# Patient Record
Sex: MALE | Race: WHITE | NOT HISPANIC OR LATINO | Employment: FULL TIME | URBAN - METROPOLITAN AREA
[De-identification: names, ages, dates, MRNs, and addresses within clinical notes are randomized per-mention and may not be internally consistent; named-entity substitution may affect disease eponyms.]

---

## 2017-01-04 RX ORDER — DICLOFENAC SODIUM 75 MG/1
TABLET, DELAYED RELEASE ORAL
Qty: 60 TABLET | Refills: 12 | Status: SHIPPED | OUTPATIENT
Start: 2017-01-04 | End: 2017-12-27 | Stop reason: ALTCHOICE

## 2017-01-04 RX ORDER — ALLOPURINOL 100 MG/1
TABLET ORAL
Qty: 60 TABLET | Refills: 12 | Status: SHIPPED | OUTPATIENT
Start: 2017-01-04 | End: 2018-01-08 | Stop reason: SDUPTHER

## 2017-01-04 RX ORDER — LORAZEPAM 1 MG/1
TABLET ORAL
Qty: 30 TABLET | Refills: 0 | Status: SHIPPED | OUTPATIENT
Start: 2017-01-04 | End: 2017-02-01 | Stop reason: SDUPTHER

## 2017-02-02 RX ORDER — LORAZEPAM 1 MG/1
TABLET ORAL
Qty: 30 TABLET | Refills: 0 | Status: SHIPPED | OUTPATIENT
Start: 2017-02-02 | End: 2017-02-27 | Stop reason: SDUPTHER

## 2017-02-16 ENCOUNTER — LAB VISIT (OUTPATIENT)
Dept: LAB | Facility: HOSPITAL | Age: 62
End: 2017-02-16
Payer: COMMERCIAL

## 2017-02-16 ENCOUNTER — OFFICE VISIT (OUTPATIENT)
Dept: HEPATOLOGY | Facility: CLINIC | Age: 62
End: 2017-02-16
Payer: COMMERCIAL

## 2017-02-16 VITALS
BODY MASS INDEX: 29.87 KG/M2 | RESPIRATION RATE: 20 BRPM | HEIGHT: 66 IN | HEART RATE: 68 BPM | DIASTOLIC BLOOD PRESSURE: 77 MMHG | WEIGHT: 185.88 LBS | OXYGEN SATURATION: 96 % | TEMPERATURE: 98 F | SYSTOLIC BLOOD PRESSURE: 136 MMHG

## 2017-02-16 DIAGNOSIS — K76.0 FATTY LIVER: Primary | ICD-10-CM

## 2017-02-16 DIAGNOSIS — R74.8 ELEVATED LIVER ENZYMES: ICD-10-CM

## 2017-02-16 DIAGNOSIS — D69.6 THROMBOCYTOPENIA: ICD-10-CM

## 2017-02-16 DIAGNOSIS — R79.89 ELEVATED FERRITIN: ICD-10-CM

## 2017-02-16 DIAGNOSIS — E11.9 TYPE 2 DIABETES MELLITUS WITHOUT COMPLICATION, WITHOUT LONG-TERM CURRENT USE OF INSULIN: ICD-10-CM

## 2017-02-16 DIAGNOSIS — E66.09 NON MORBID OBESITY DUE TO EXCESS CALORIES: ICD-10-CM

## 2017-02-16 DIAGNOSIS — K76.0 FATTY LIVER: ICD-10-CM

## 2017-02-16 DIAGNOSIS — E78.5 HYPERLIPIDEMIA, UNSPECIFIED HYPERLIPIDEMIA TYPE: ICD-10-CM

## 2017-02-16 DIAGNOSIS — F10.10 ALCOHOL ABUSE: ICD-10-CM

## 2017-02-16 LAB
ALBUMIN SERPL BCP-MCNC: 4 G/DL
ALP SERPL-CCNC: 54 U/L
ALT SERPL W/O P-5'-P-CCNC: 28 U/L
AST SERPL-CCNC: 51 U/L
BILIRUB DIRECT SERPL-MCNC: 0.3 MG/DL
BILIRUB SERPL-MCNC: 0.8 MG/DL
CERULOPLASMIN SERPL-MCNC: 15 MG/DL
FERRITIN SERPL-MCNC: 858 NG/ML
PROT SERPL-MCNC: 7.7 G/DL

## 2017-02-16 PROCEDURE — 3075F SYST BP GE 130 - 139MM HG: CPT | Mod: S$GLB,,, | Performed by: NURSE PRACTITIONER

## 2017-02-16 PROCEDURE — 99214 OFFICE O/P EST MOD 30 MIN: CPT | Mod: S$GLB,,, | Performed by: NURSE PRACTITIONER

## 2017-02-16 PROCEDURE — 3044F HG A1C LEVEL LT 7.0%: CPT | Mod: S$GLB,,, | Performed by: NURSE PRACTITIONER

## 2017-02-16 PROCEDURE — 80076 HEPATIC FUNCTION PANEL: CPT

## 2017-02-16 PROCEDURE — 82104 ALPHA-1-ANTITRYPSIN PHENO: CPT

## 2017-02-16 PROCEDURE — 36415 COLL VENOUS BLD VENIPUNCTURE: CPT

## 2017-02-16 PROCEDURE — 82728 ASSAY OF FERRITIN: CPT

## 2017-02-16 PROCEDURE — 2022F DILAT RTA XM EVC RTNOPTHY: CPT | Mod: S$GLB,,, | Performed by: NURSE PRACTITIONER

## 2017-02-16 PROCEDURE — 82390 ASSAY OF CERULOPLASMIN: CPT

## 2017-02-16 PROCEDURE — 81256 HFE GENE: CPT

## 2017-02-16 PROCEDURE — 3060F POS MICROALBUMINURIA REV: CPT | Mod: S$GLB,,, | Performed by: NURSE PRACTITIONER

## 2017-02-16 PROCEDURE — 3078F DIAST BP <80 MM HG: CPT | Mod: S$GLB,,, | Performed by: NURSE PRACTITIONER

## 2017-02-16 PROCEDURE — 99999 PR PBB SHADOW E&M-EST. PATIENT-LVL IV: CPT | Mod: PBBFAC,,, | Performed by: NURSE PRACTITIONER

## 2017-02-16 NOTE — MR AVS SNAPSHOT
[facility] - Hepatology  [address]  [facility] [address]  Phone: [phone]  Fax: [phone]                  Donald Warren Abadie   2017 10:00 AM   Office Visit    Description:  Male : 1955   Provider:  Marlena Sibley NP   Department:  [facility] - Hepatology           Reason for Visit     Fatty Liver     Elevated Hepatic Enzymes           Diagnoses this Visit        Comments    Fatty liver    -  Primary     Elevated liver enzymes         Elevated ferritin                To Do List           Future Appointments        Provider Department Dept Phone    2017 9:30 AM INJECTION, INFECTIOUS DISEASES [facility]- ID Injection Room [phone]      Goals (5 Years of Data)     None      [facility]     [facility] -  Assistance  Registered nurses in the [facility] provide clinical advisement, health education, appointment booking, and other advisory services.  Call for this free service at [phone].             Medications           Message regarding Medications     Verify the changes and/or additions to your medication regime listed below are the same as discussed with your clinician today.  If any of these changes or additions are incorrect, please notify your healthcare provider.             Verify that the below list of medications is an accurate representation of the medications you are currently taking.  If none reported, the list may be blank. If incorrect, please contact your healthcare provider. Carry this list with you in case of emergency.           Current Medications     allopurinol (ZYLOPRIM) 100 MG tablet TAKE 2 TABLETS BY MOUTH EVERY DAY    diclofenac (VOLTAREN) 75 MG EC tablet TAKE 1 TABLET BY MOUTH TWO TIMES A DAY    fenofibrate 160 MG Tab TAKE ONE TABLET BY MOUTH EVERY NIGHT AT BEDTIME    lorazepam (ATIVAN) 1 MG tablet take 1 tablet by mouth three times a day    metoprolol succinate (TOPROL-XL) 25 MG 24 hr tablet TAKE 1  "TABLET BY MOUTH EVERY DAY.    metoprolol tartrate (LOPRESSOR) 25 MG tablet Take 1 tablet (25 mg total) by mouth once daily. Pt states he only take this when needed    omega-3 acid ethyl esters (LOVAZA) 1 gram capsule Take 2 g by mouth 2 (two) times daily.    rosuvastatin (CRESTOR) 5 MG tablet Take 1 tablet (5 mg total) by mouth once daily.    aspirin 81 MG Chew Take 1 tablet (81 mg total) by mouth once daily.           Clinical Reference Information           Your Vitals Were     BP Pulse Temp Resp Height Weight    136/77 (BP Location: Left arm, Patient Position: Sitting, BP Method: Automatic) 68 98.1 °F (36.7 °C) (Oral) 20 5' 6" (1.676 m) 84.3 kg (185 lb 13.6 oz)    SpO2 BMI             96% 30 kg/m2         Blood Pressure          Most Recent Value    BP  136/77      Allergies as of 2/16/2017     No Known Drug Allergies      Immunizations Administered on Date of Encounter - 2/16/2017     None      Orders Placed During Today's Visit     Future Labs/Procedures Expected by Expires    MRI Abdomen Without Contrast  2/16/2017 2/16/2018    Alpha 1 Antitrypsin Phenotype  As directed 2/16/2018    Ceruloplasmin  As directed 2/16/2018    Ferritin  As directed 2/16/2018    Hemochromatosis DNA Analysis (PCR)  As directed 2/16/2018    Hepatic function panel  As directed 2/16/2018      Language Assistance Services     ATTENTION: Language assistance services are available, free of charge. Please call [phone].      ATENCIÓN: Si habla español, tiene a aguilar disposición servicios gratuitos de asistencia lingüística. Llame al [phone].     [facility] Ý: N?u b?n nói Ti?ng Vi?t, có các d?ch v? h? tr? ngôn ng? mi?n phí dành cho b?n. G?i s? [phone].         [facility] complies with applicable Federal civil rights laws and does not discriminate on the basis of race, color, national origin, age, disability, or sex.        "

## 2017-02-16 NOTE — PROGRESS NOTES
[facility] Established Patient Visit    Reason for Visit:  F/u fatty liver, alcohol use    PCP: Bacilio Larry    HPI:  This is a 61 y.o. male here for f/u of evaluation for fatty liver and alcohol use/abuse. He has a daily alcohol intake with 12 pack of beer for many yrs. He recently decreased his intake to 6 beers daily since Dr. Larry made him appt to come see us. He reports he got into the habit of drinking after work, used to work construction. He reports he can quit if needed. He has had mildly elevated transaminases with AST > ALT for yrs. Tbili nl for the most part. Alb nl. INR nl. Plts have been intermittently low but spleen nl on imaging. Ferritin was elevated in 2004 at 706 but with normal serum iron and iron sat. He had recent U/s that showed enlarged liver at 18.3 cm with steatosis. No ascites or masses. He started Crestor last year for his cholesterol TGL are high but this is familial. He is not on any medications for his DM, diet controlled currently.    His viral hepatitis B and C were negative. He has started vaccines for hepatitis A and B since he was not immune. His repeat ferritin was higher at 1344 with normal serum iron and iron sat still. His Fibroscan suggested possible F2, moderate fibrosis. He did decrease his alcohol intake some more. At initial visit, was drinking 6 beers daily, now is doing 3-4 daily. He stated he can't stop cold turkey because he's been drinking since he was 14. He will continue to wean down slowly.    He denies any symptoms of advanced chronic liver disease including jaundice, dark urine, hematemesis, melena, slowed mentation, abdominal distention.      ROS:   GENERAL: Denies fever, chills, weight loss/gain, fatigue  HEENT: Denies headaches, dizziness, vision/hearing changes  CARDIOVASCULAR: Denies chest pain, palpitations, or edema  RESPIRATORY: Denies dyspnea, cough  GI: Denies abdominal pain, rectal bleeding, nausea, vomiting. No change in bowel  "pattern or color  : Denies dysuria, hematuria   SKIN: Denies rash, itching   NEURO: Denies confusion, memory loss, or mood changes  PSYCH: Denies depression or anxiety  HEME/LYMPH: Denies easy bruising or bleeding      PMHX:  has a past medical history of A-fib; Alcohol abuse; Arthritis; Chronic gout; Diabetes mellitus; DM (diabetes mellitus) (11/16/2016); Fatty liver; Hyperlipidemia; and Renal cell cancer (2014).    PSHX:  has a past surgical history that includes Abscess drainage; Hand surgery (Left); Partial nephrectomy (Left, August 2014); and Colonoscopy.    The patient's social and family histories were reviewed by me and updated in the appropriate section of the electronic medical record.    Review of patient's allergies indicates:   Allergen Reactions    No known drug allergies        Medications reviewed in Epic      Objective Findings:    PHYSICAL EXAM:   Friendly White male, in no acute distress; alert and oriented to person, place and time  VITALS:   Visit Vitals    /77 (BP Location: Left arm, Patient Position: Sitting, BP Method: Automatic)    Pulse 68    Temp 98.1 °F (36.7 °C) (Oral)    Resp 20    Ht 5' 6" (1.676 m)    Wt 84.3 kg (185 lb 13.6 oz)    SpO2 96%    BMI 30 kg/m2     HENT: Normocephalic, without obvious abnormality. Oral mucosa pink and moist. Dentition good.  EYES: Sclerae anicteric. No conjunctival pallor.   NECK: Supple.   CARDIOVASCULAR: Regular rate and rhythm. No murmurs.  RESPIRATORY: Normal respiratory effort. BBS CTA. No wheezes or crackles.  GI: Soft, non-tender, non-distended. (+) hepatomegaly. No masses palpable. No ascites.  EXTREMITIES:  No clubbing, cyanosis or edema.  SKIN: Warm and dry. No jaundice. No rashes noted to exposed skin. (+) telangectasias noted. No palmar erythema.  NEURO:  Normal gate. No asterixis.  PSYCH:  Memory intact. Thought and speech pattern appropriate. Behavior normal. No depression or anxiety noted.      Labs:  Lab Results   Component " Value Date    WBC 6.88 10/13/2016    HGB 15.3 10/13/2016    HCT 43.3 10/13/2016     (L) 10/13/2016    CHOL 152 10/13/2016    TRIG 470 (H) 10/13/2016    HDL 22 (L) 10/13/2016     12/21/2016    K 4.4 12/21/2016    CREATININE 1.1 12/21/2016    ALT 37 12/21/2016    AST 65 (H) 12/21/2016    ALKPHOS 56 12/21/2016    BILITOT 0.5 12/21/2016    ALBUMIN 4.1 12/21/2016    INR 1.1 11/16/2016       ASSESSMENT:  61 y.o. White male with:  1.  Elevated liver enzymes, due to fatty liver due to alcohol abuse and overweight, DM, and HLD  -- AST > ALT  -- nl alk phos and Tbili   -- imaging shows hepatomegaly with steatosis  -- ferritin elevated but suspect d/t alcohol and inflammation  2. Fatty liver, due to both alcohol and NAFLD likely since he has DM And HLD and is overweight  -- transaminases mildly elevated, AST > ALT  -- US shows hepatomegaly with steatosis  -- synthetic liver function nl  -- risk factors for fatty liver include obesity, DM, HLD, alcohol abuse  -- Fibroscan = F2  3. Alcohol abuse  -- pt currently drinking 3-4 beers daily. Advised he try to decrease more to 2-3 daily. He will work on this  4. Elevated ferritin, suspect d/t alcohol use and inflammation  -- normal serum iron and iron sat but will check HH DNA analysis today  5. Thrombocytopenia, intermittent  -- spleen nl at 10.4 cm on u/s       EDUCATION:   We discussed the manifestations of fatty liver disease. At this time there is no FDA approved therapy for NAFLD.   The patient and I discussed the importance of diet, exercise, and weight loss for management of NAFLD. Discussed stopping alcohol as well since alcohol is definitely contributing to this as well.     We discussed a low fat, low carb/low sugar, high fiber diet, and a goal of 30 minutes of exercise 5 times per week.   Pt is aware that fatty liver can progress to steatohepatitis and possibly to cirrhosis, putting one at increased risk for liver cancer, liver failure, and death.      Pt is  aware that excessive alcohol use can also lead to cirrhosis, putting one at increased risk for liver cancer, liver failure, and death.     Discussed that he definitely has at least moderate fibrosis based on Fibroscan results but want to assess this further with MR elastography since he has intermittent thrombocytopenia, telangiectasias. If his scan does show F3 or F4, this may be a motivating factor to get him to quit alcohol completely.        PLAN:  1. Labs today  2. MR elastography  3. Continue to minimize alcohol use and stop completely if possible. He will try to decrease to 2-3 beers daily  4. Weight loss, goal of 10 lbs  5. F/u in 3 months    Thank you for allowing me to participate in the care of Kleber Pinedaen Abadie Alicia C Debautte, ZULEMA-C

## 2017-02-20 LAB
A1AT PHENOTYP SERPL-IMP: NORMAL BANDS
A1AT SERPL NEPH-MCNC: 136 MG/DL

## 2017-02-24 LAB
GENETICIST REVIEW: NORMAL
HFE GENE MUT ANL BLD/T: NORMAL
HFE RELEASED BY: NORMAL
HFE RESULT SUMMARY: NORMAL
REF LAB TEST METHOD: NORMAL
SPECIMEN SOURCE: NORMAL
SPECIMEN,  HEMOCHROMATOSIS: NORMAL

## 2017-02-27 RX ORDER — LORAZEPAM 1 MG/1
TABLET ORAL
Qty: 30 TABLET | Refills: 0 | Status: SHIPPED | OUTPATIENT
Start: 2017-02-27 | End: 2017-03-27 | Stop reason: SDUPTHER

## 2017-03-09 DIAGNOSIS — E78.5 HYPERLIPIDEMIA: ICD-10-CM

## 2017-03-10 RX ORDER — ROSUVASTATIN CALCIUM 5 MG/1
TABLET, COATED ORAL
Qty: 90 TABLET | Refills: 3 | Status: SHIPPED | OUTPATIENT
Start: 2017-03-10 | End: 2017-03-21 | Stop reason: SDUPTHER

## 2017-03-21 DIAGNOSIS — E78.5 HYPERLIPIDEMIA: ICD-10-CM

## 2017-03-21 RX ORDER — ROSUVASTATIN CALCIUM 5 MG/1
TABLET, COATED ORAL
Qty: 90 TABLET | Refills: 3 | Status: SHIPPED | OUTPATIENT
Start: 2017-03-21 | End: 2018-02-07 | Stop reason: SDUPTHER

## 2017-03-24 RX ORDER — METOPROLOL SUCCINATE 25 MG/1
TABLET, EXTENDED RELEASE ORAL
Qty: 30 TABLET | Refills: 1 | Status: SHIPPED | OUTPATIENT
Start: 2017-03-24 | End: 2017-03-27 | Stop reason: SDUPTHER

## 2017-03-27 RX ORDER — METOPROLOL SUCCINATE 25 MG/1
TABLET, EXTENDED RELEASE ORAL
Qty: 30 TABLET | Refills: 1 | Status: SHIPPED | OUTPATIENT
Start: 2017-03-27 | End: 2017-07-08 | Stop reason: SDUPTHER

## 2017-03-27 RX ORDER — LORAZEPAM 1 MG/1
TABLET ORAL
Qty: 30 TABLET | Refills: 0 | Status: SHIPPED | OUTPATIENT
Start: 2017-03-27 | End: 2017-04-24 | Stop reason: SDUPTHER

## 2017-04-21 ENCOUNTER — NURSE TRIAGE (OUTPATIENT)
Dept: ADMINISTRATIVE | Facility: CLINIC | Age: 62
End: 2017-04-21

## 2017-04-22 NOTE — TELEPHONE ENCOUNTER
"    Reason for Disposition   [1] BP  >= 140/90 AND [2] not taking BP medications    Protocols used:  HIGH BLOOD PRESSURE-A-JOSE Shahid 's daughter, Chani, called to say she is concerned about her father's blood pressure.  She said he was just sweeping his house, doing laundry and folding clothes today, and he took his BP. 170/110.  Chani said he did not take his Toprol XL 25 mg today until his pressure was that high, and he also took a "breakthrough control" of 25 mg Lopressor at that time.  He had no symptoms at that time, per Chani, and his BP was 146/86 after he took them. I asked if perhaps he has missed more doses, and she said that this is very possible.  Another concern she has is that his AF is recurring more frequently now.  Chani said it used to happen about once a month, then once a week, and now it happens at least every two days, and usually every other day.  Chani is tearful, but does not know how to help him.  She requests that this information be sent to his cardiologist, Dr Ramon Giang.  She says she knows he has not been in touch with him about the AF increasing.  Assured her I will message Dr Giang, and Dr Larry, PCP.  Please contact caller directly with any additional care advice.      "

## 2017-04-25 RX ORDER — LORAZEPAM 1 MG/1
TABLET ORAL
Qty: 30 TABLET | Refills: 0 | Status: SHIPPED | OUTPATIENT
Start: 2017-04-25 | End: 2017-05-16 | Stop reason: SDUPTHER

## 2017-04-27 ENCOUNTER — TELEPHONE (OUTPATIENT)
Dept: INTERNAL MEDICINE | Facility: CLINIC | Age: 62
End: 2017-04-27

## 2017-04-27 NOTE — TELEPHONE ENCOUNTER
----- Message from Emeka Jaquez sent at 4/27/2017  1:35 PM CDT -----  Contact: Chani/ Daughter/ [phone] cell  Type: Returning a call    Who left a message? Unknown    When did the practice call? 04/27/2017    Comments: Pt's daughter is returning the call and is waiting on a call back.  Please call and advise.    Thank you

## 2017-05-08 ENCOUNTER — TELEPHONE (OUTPATIENT)
Dept: ELECTROPHYSIOLOGY | Facility: CLINIC | Age: 62
End: 2017-05-08

## 2017-05-08 DIAGNOSIS — I48.91 ATRIAL FIBRILLATION, UNSPECIFIED TYPE: Primary | ICD-10-CM

## 2017-05-11 ENCOUNTER — OFFICE VISIT (OUTPATIENT)
Dept: INTERNAL MEDICINE | Facility: CLINIC | Age: 62
End: 2017-05-11
Payer: COMMERCIAL

## 2017-05-11 ENCOUNTER — TELEPHONE (OUTPATIENT)
Dept: ELECTROPHYSIOLOGY | Facility: CLINIC | Age: 62
End: 2017-05-11

## 2017-05-11 VITALS
DIASTOLIC BLOOD PRESSURE: 70 MMHG | WEIGHT: 181.44 LBS | HEART RATE: 58 BPM | SYSTOLIC BLOOD PRESSURE: 122 MMHG | OXYGEN SATURATION: 97 % | HEIGHT: 66 IN | BODY MASS INDEX: 29.16 KG/M2

## 2017-05-11 DIAGNOSIS — R03.0 TRANSIENT ELEVATED BLOOD PRESSURE: Primary | ICD-10-CM

## 2017-05-11 PROCEDURE — 99999 PR PBB SHADOW E&M-EST. PATIENT-LVL III: CPT | Mod: PBBFAC,,, | Performed by: PHYSICIAN ASSISTANT

## 2017-05-11 PROCEDURE — 93005 ELECTROCARDIOGRAM TRACING: CPT | Mod: S$GLB,,, | Performed by: INTERNAL MEDICINE

## 2017-05-11 PROCEDURE — 1160F RVW MEDS BY RX/DR IN RCRD: CPT | Mod: S$GLB,,, | Performed by: PHYSICIAN ASSISTANT

## 2017-05-11 PROCEDURE — 3078F DIAST BP <80 MM HG: CPT | Mod: S$GLB,,, | Performed by: PHYSICIAN ASSISTANT

## 2017-05-11 PROCEDURE — 3074F SYST BP LT 130 MM HG: CPT | Mod: S$GLB,,, | Performed by: PHYSICIAN ASSISTANT

## 2017-05-11 PROCEDURE — 99213 OFFICE O/P EST LOW 20 MIN: CPT | Mod: S$GLB,,, | Performed by: PHYSICIAN ASSISTANT

## 2017-05-11 PROCEDURE — 93010 ELECTROCARDIOGRAM REPORT: CPT | Mod: S$GLB,,, | Performed by: INTERNAL MEDICINE

## 2017-05-11 NOTE — TELEPHONE ENCOUNTER
----- Message from Faith Parrish sent at 5/11/2017  8:12 AM CDT -----  Contact: pt's daughter  Pt has been experiencing an increase in his blood pressure.  He vomited yesterday and he has been going into Afib more often.  No other symptoms.  He wanted an appt today but there are none available.  His daughter can be reached @ [phone].  Thanks!!

## 2017-05-11 NOTE — PROGRESS NOTES
Subjective:       Patient ID: Donald Warren Abadie is a 61 y.o. male.        Chief Complaint: Hypertension and Emesis    HPI Comments: Donald Warren Abadie is an established patient of Bacilio Larry MD here today for urgent care visit.    Within the last 2-3 months, he has noticed higher BP readings.  He has a home BP cuff and checks his BP daily.  Occasionally elevated readings.  In the past week he has had more persistently elevated readings.  2 times in the last week with high readings.  Last week 180/101.  Last night BP was 160 systolic.  This morning 148/90.  Right now blood pressure is fine.      He takes Toprol XL 25 mg but breaking it in half and taking 1/2 morning and 1/2 in evening.  He has Metoprolol 25 mg to use for breakthrough.  He also takes Ativan at times when BP goes up because he gets anxious.      Drinking more alcohol than usual the past week.  Drinks 12 beers daily.      Can feel when he goes into Afib, follows with Dr. Giang.  Has f/u with him next week.  He has had more frequent episodes of Afib.      No chest pain or shortness of breath.  Vomited once yesterday after drinking too much alcohol and not eating.  Only one episode, no recurrence.  No diarrhea or constipation.  No abdominal pain.  No fever.           Review of Systems   Constitutional: Negative for appetite change, chills, fatigue and fever.   HENT: Negative for congestion and sore throat.    Eyes: Negative for visual disturbance.   Respiratory: Negative for cough, chest tightness and shortness of breath.    Cardiovascular: Negative for chest pain, palpitations and leg swelling.   Gastrointestinal: Positive for vomiting. Negative for abdominal pain, blood in stool, constipation, diarrhea and nausea.   Genitourinary: Negative for dysuria, frequency, hematuria and urgency.   Musculoskeletal: Negative for arthralgias and back pain.   Skin: Negative for rash.   Neurological: Negative for dizziness, syncope, weakness and headaches.  "  Psychiatric/Behavioral: Negative for dysphoric mood and sleep disturbance. The patient is not nervous/anxious.        Objective:      Physical Exam   Constitutional: He appears well-developed and well-nourished.   HENT:   Head: Normocephalic.   Right Ear: External ear normal.   Left Ear: External ear normal.   Mouth/Throat: Oropharynx is clear and moist.   Eyes: Pupils are equal, round, and reactive to light.   Cardiovascular: Normal rate, regular rhythm and normal heart sounds.  Exam reveals no gallop and no friction rub.    No murmur heard.  Pulmonary/Chest: Effort normal and breath sounds normal. No respiratory distress.   Abdominal: Soft. There is no tenderness.   Musculoskeletal: He exhibits no edema.   Neurological: He is alert.   Skin: Skin is warm and dry.   Psychiatric: He has a normal mood and affect.   Nursing note and vitals reviewed.      Assessment:       1. Transient elevated blood pressure        Plan:       Kleber was seen today for hypertension and emesis.    Diagnoses and all orders for this visit:    Transient elevated blood pressure  -     EKG 12-lead    EKG reviewed with Dr. Larry.  He has been drinking more alcohol in the past week.  Importance of cutting back discussed.  Blood pressure is fine in clinic today.  Continue to monitor BP readings.  Bring in log for review with Dr. Larry in 2-4 weeks.  ED prompts discussed in detail.  Keep appointment with Dr. Giang next week.    Pt has been given instructions populated from [employer] database and has verbalized understanding of the after visit summary and information contained wherein.    Follow up with a primary care provider. May go to ER for acute shortness of breath, lightheadedness, fever, or any other emergent complaints or changes in condition.    "This note will be shared with the patient"    Future Appointments  Date Time Provider Department Center   5/16/2017 8:00 AM Ramon Giang MD [facility] AR[facility]HYTH [facility]   5/16/2017 9:20 AM " Marlena Sibley, NP [facility] HEPAT [facility]   5/22/2017 9:30 AM [facility]

## 2017-05-11 NOTE — PATIENT INSTRUCTIONS
Discharge Instructions for Atrial Fibrillation  You have been diagnosed with an abnormal heart rhythm called atrial fibrillation. With this condition, your hearts 2 upper chambers quiver rather than squeeze the blood out in a normal pattern. This leads to an irregular and sometimes rapid heartbeat. Some people will develop associated symptoms such as a flip-flopping heartbeat, chest pain, lightheadedness, or shortness of breath. Other people may have no symptoms at all. Atrial fibrillation is serious because it affects the hearts ability to fill with blood as it should. Blood clots may form. This increases the risk for stroke. Untreated atrial fibrillation can also lead to heart failure. Atrial fibrillation can be controlled. With treatment, most people with atrial fibrillation lead normal lives.  Treatment options  Recommended treatment for atrial fibrillation depends on your age, symptoms, how long you have had atrial fibrillation, and other factors. You will have a complete evaluation to find out if you have any abnormalities that caused your heart to go into atrial fibrillation. This might be blocked heart arteries or a thyroid problem. Your doctor will assess your particular case and discuss choices with you.  Treatment choices may include:  · Treating an underlying disorder that puts you at risk for atrial fibrillation. For example, correcting an abnormal thyroid or electrolyte problem, or treating a blocked heart artery.  · Restoring a normal heart rhythm with an electrical shock (cardioversion) or with an antiarrhythmic medicine (chemical cardioversion)  · Using medicine to control your heart rate in atrial fibrillation.  · Preventing the risk for blood clot and stroke using blood-thinning medicines. Your doctor will tell you what he or she recommends. Choices may include aspirin, clopidogrel, warfarin, dabigatran, rivaroxaban, apixaban, and edoxaban.  · Doing catheter ablation or a surgical maze  procedure. These use different methods to destroy certain areas of heart tissue. This interrupts the electrical signals causing atrial fibrillation. One of these procedures may be a choice when medicines do not work, or as an alternative to long-term medicine.  · Other treatment choices may be recommended for you by your doctor.  Managing risk factors for stroke and preventing heart failure are important parts of any treatment plan for atrial fibrillation.  Home care  · Take your medicines exactly as directed. Dont skip doses.  · Work with your doctor to find the right medicines and doses for you.  · Learn to take your own pulse. Keep a record of your results. Ask your doctor which pulse rates mean that you need medical attention. Slowing your pulse is often the goal of treatment. Ask your doctor if its OK for you to use an automatic machine to check your pulse at home. Sometimes these machines dont count the pulse correctly when you have atrial fibrillation.  · Limit your intake of coffee, tea, cola, and other beverages with caffeine. Talk with your doctor about whether you should eliminate caffeine.  · Avoid over-the-counter medicines that have caffeine in them.  · Let your doctor know what medicines you take, including prescription and over-the-counter medicines, as well as any supplements. They interfere with some medicines given for atrial fibrillation.  · Ask your doctor about whether you can drink alcohol. Some people need to avoid alcohol to better treat atrial fibrillation. If you are taking blood-thinner medicines, alcohol may interfere with them by increasing their effect.  · Never take stimulants such as amphetamines or cocaine. These drugs can speed up your heart rate and trigger atrial fibrillation.  Follow-up care  Follow up with your doctor, or as advised.     When should I call my healthcare provider  Call your healthcare provider right away if you have any of the  following:  · Weakness  · Dizziness  · Fainting  · Fatigue  · Shortness of breath  · Chest pain with increased activity  · A change in the usual regularity of your heartbeat, or an unusually fast heartbeat   Date Last Reviewed: 4/23/2016  © 1944-2017 [employer]. [address] [place] [address], [place] [address]. All rights reserved. This information is not intended as a substitute for professional medical care. Always follow your healthcare professional's instructions.         How Severe Is Your Skin Lesion?: moderate Has Your Skin Lesion Been Treated?: not been treated Is This A New Presentation, Or A Follow-Up?: Skin Lesion

## 2017-05-11 NOTE — TELEPHONE ENCOUNTER
Spoke with pt's daughter. Urgent appt with Dr. Angel canceled by MA--urgent message sent to PCP for high BP complaint. Appt scheduled this afternoon for pt with PCP. Also f/u appt scheduled with Dr. Giang re: AF for next week by MA.

## 2017-05-11 NOTE — MR AVS SNAPSHOT
[facility] - Internal Medicine  [address]  [facility] [address]  Phone: [phone]  Fax: [phone]                  Donald Warren Abadie   2017 2:30 PM   Office Visit    Description:  Male : 1955   Provider:  Dhara Thomas PA-C   Department:  [facility] - Internal Medicine           Reason for Visit     Hypertension     Emesis           Diagnoses this Visit        Comments    Transient elevated blood pressure    -  Primary            To Do List           Future Appointments        Provider Department Dept Phone    2017 8:00 AM Ramon Giang MD [facility] - Arrhythmia [phone]    2017 9:20 AM Marlena Sibley NP [facility] Hepatology [phone]    2017 9:30 AM INJECTION, INFECTIOUS DISEASES [facility] ID Injection Room [phone]    2017 8:00 AM Bacilio Larry MD [facility] Internal Medicine [phone]      Goals (5 Years of Data)     None      Follow-Up and Disposition     Return if symptoms worsen or fail to improve.      [facility]     [facility] On Call Nurse Care Line -  Assistance  Unless otherwise directed by your provider, please contact [facility], our nurse care line that is available for  assistance.     Registered nurses in the [facility] provide: appointment scheduling, clinical advisement, health education, and other advisory services.  Call: [phone] (toll free)               Medications           Message regarding Medications     Verify the changes and/or additions to your medication regime listed below are the same as discussed with your clinician today.  If any of these changes or additions are incorrect, please notify your healthcare provider.             Verify that the below list of medications is an accurate representation of the medications you are currently taking.  If none reported, the list may be blank. If incorrect, please contact your healthcare provider. Carry this list with you in  "case of emergency.           Current Medications     allopurinol (ZYLOPRIM) 100 MG tablet TAKE 2 TABLETS BY MOUTH EVERY DAY    aspirin 81 MG Chew Take 1 tablet (81 mg total) by mouth once daily.    diclofenac (VOLTAREN) 75 MG EC tablet TAKE 1 TABLET BY MOUTH TWO TIMES A DAY    fenofibrate 160 MG Tab TAKE ONE TABLET BY MOUTH EVERY NIGHT AT BEDTIME    lorazepam (ATIVAN) 1 MG tablet take 1 tablet by mouth three times a day    metoprolol succinate (TOPROL-XL) 25 MG 24 hr tablet take 1 tablet by mouth once daily    metoprolol tartrate (LOPRESSOR) 25 MG tablet Take 1 tablet (25 mg total) by mouth once daily. Pt states he only take this when needed    omega-3 acid ethyl esters (LOVAZA) 1 gram capsule Take 2 g by mouth 2 (two) times daily.    rosuvastatin (CRESTOR) 5 MG tablet take 1 tablet by mouth once daily           Clinical Reference Information           Your Vitals Were     BP Pulse Height Weight SpO2 BMI    122/70 (BP Location: Left arm, Patient Position: Sitting, BP Method: Manual) 58 5' 6" (1.676 m) 82.3 kg (181 lb 7 oz) 97% 29.28 kg/m2      Blood Pressure          Most Recent Value    BP  122/70      Allergies as of 5/11/2017     No Known Drug Allergies      Immunizations Administered on Date of Encounter - 5/11/2017     None      Orders Placed During Today's Visit      Normal Orders This Visit    EKG 12-lead       Instructions      Discharge Instructions for Atrial Fibrillation  You have been diagnosed with an abnormal heart rhythm called atrial fibrillation. With this condition, your hearts 2 upper chambers quiver rather than squeeze the blood out in a normal pattern. This leads to an irregular and sometimes rapid heartbeat. Some people will develop associated symptoms such as a flip-flopping heartbeat, chest pain, lightheadedness, or shortness of breath. Other people may have no symptoms at all. Atrial fibrillation is serious because it affects the hearts ability to fill with blood as it should. Blood clots " may form. This increases the risk for stroke. Untreated atrial fibrillation can also lead to heart failure. Atrial fibrillation can be controlled. With treatment, most people with atrial fibrillation lead normal lives.  Treatment options  Recommended treatment for atrial fibrillation depends on your age, symptoms, how long you have had atrial fibrillation, and other factors. You will have a complete evaluation to find out if you have any abnormalities that caused your heart to go into atrial fibrillation. This might be blocked heart arteries or a thyroid problem. Your doctor will assess your particular case and discuss choices with you.  Treatment choices may include:  · Treating an underlying disorder that puts you at risk for atrial fibrillation. For example, correcting an abnormal thyroid or electrolyte problem, or treating a blocked heart artery.  · Restoring a normal heart rhythm with an electrical shock (cardioversion) or with an antiarrhythmic medicine (chemical cardioversion)  · Using medicine to control your heart rate in atrial fibrillation.  · Preventing the risk for blood clot and stroke using blood-thinning medicines. Your doctor will tell you what he or she recommends. Choices may include aspirin, clopidogrel, warfarin, dabigatran, rivaroxaban, apixaban, and edoxaban.  · Doing catheter ablation or a surgical maze procedure. These use different methods to destroy certain areas of heart tissue. This interrupts the electrical signals causing atrial fibrillation. One of these procedures may be a choice when medicines do not work, or as an alternative to long-term medicine.  · Other treatment choices may be recommended for you by your doctor.  Managing risk factors for stroke and preventing heart failure are important parts of any treatment plan for atrial fibrillation.  Home care  · Take your medicines exactly as directed. Dont skip doses.  · Work with your doctor to find the right medicines and doses for  you.  · Learn to take your own pulse. Keep a record of your results. Ask your doctor which pulse rates mean that you need medical attention. Slowing your pulse is often the goal of treatment. Ask your doctor if its OK for you to use an automatic machine to check your pulse at home. Sometimes these machines dont count the pulse correctly when you have atrial fibrillation.  · Limit your intake of coffee, tea, cola, and other beverages with caffeine. Talk with your doctor about whether you should eliminate caffeine.  · Avoid over-the-counter medicines that have caffeine in them.  · Let your doctor know what medicines you take, including prescription and over-the-counter medicines, as well as any supplements. They interfere with some medicines given for atrial fibrillation.  · Ask your doctor about whether you can drink alcohol. Some people need to avoid alcohol to better treat atrial fibrillation. If you are taking blood-thinner medicines, alcohol may interfere with them by increasing their effect.  · Never take stimulants such as amphetamines or cocaine. These drugs can speed up your heart rate and trigger atrial fibrillation.  Follow-up care  Follow up with your doctor, or as advised.     When should I call my healthcare provider  Call your healthcare provider right away if you have any of the following:  · Weakness  · Dizziness  · Fainting  · Fatigue  · Shortness of breath  · Chest pain with increased activity  · A change in the usual regularity of your heartbeat, or an unusually fast heartbeat   Date Last Reviewed: 4/23/2016  © 6108-5964 [employer]. [address] [place] [address], [place] [address]. All rights reserved. This information is not intended as a substitute for professional medical care. Always follow your healthcare professional's instructions.             Language Assistance Services     ATTENTION: Language assistance services are available, free of charge. Please call [phone].       ATENCIÓN: Si habla español, tiene a aguilar disposición servicios gratuitos de asistencia lingüística. Lavinia al [phone].     CHÚ Ý: N?u b?n nói Ti?ng Vi?t, có các d?ch v? h? tr? ngôn ng? mi?n phí dành cho b?n. G?i s? [phone].         [facility] complies with applicable Federal civil rights laws and does not discriminate on the basis of race, color, national origin, age, disability, or sex.

## 2017-05-16 ENCOUNTER — OFFICE VISIT (OUTPATIENT)
Dept: HEPATOLOGY | Facility: CLINIC | Age: 62
End: 2017-05-16
Payer: COMMERCIAL

## 2017-05-16 ENCOUNTER — OFFICE VISIT (OUTPATIENT)
Dept: ELECTROPHYSIOLOGY | Facility: CLINIC | Age: 62
End: 2017-05-16
Payer: COMMERCIAL

## 2017-05-16 VITALS
WEIGHT: 181 LBS | SYSTOLIC BLOOD PRESSURE: 133 MMHG | HEART RATE: 51 BPM | TEMPERATURE: 98 F | DIASTOLIC BLOOD PRESSURE: 74 MMHG | HEIGHT: 66 IN | RESPIRATION RATE: 20 BRPM | HEIGHT: 66 IN | OXYGEN SATURATION: 100 % | SYSTOLIC BLOOD PRESSURE: 112 MMHG | DIASTOLIC BLOOD PRESSURE: 70 MMHG | HEART RATE: 54 BPM | BODY MASS INDEX: 29.09 KG/M2 | WEIGHT: 181.19 LBS | BODY MASS INDEX: 29.12 KG/M2

## 2017-05-16 DIAGNOSIS — E66.3 OVERWEIGHT (BMI 25.0-29.9): ICD-10-CM

## 2017-05-16 DIAGNOSIS — R79.89 ELEVATED FERRITIN: ICD-10-CM

## 2017-05-16 DIAGNOSIS — I48.91 ATRIAL FIBRILLATION, UNSPECIFIED TYPE: ICD-10-CM

## 2017-05-16 DIAGNOSIS — I48.0 PAROXYSMAL ATRIAL FIBRILLATION: Primary | ICD-10-CM

## 2017-05-16 DIAGNOSIS — F10.10 ALCOHOL ABUSE: ICD-10-CM

## 2017-05-16 DIAGNOSIS — E78.5 HYPERLIPIDEMIA, UNSPECIFIED HYPERLIPIDEMIA TYPE: ICD-10-CM

## 2017-05-16 DIAGNOSIS — R74.8 ELEVATED LIVER ENZYMES: Primary | ICD-10-CM

## 2017-05-16 DIAGNOSIS — K76.0 FATTY LIVER: ICD-10-CM

## 2017-05-16 DIAGNOSIS — E11.9 TYPE 2 DIABETES MELLITUS WITHOUT COMPLICATION, WITHOUT LONG-TERM CURRENT USE OF INSULIN: ICD-10-CM

## 2017-05-16 DIAGNOSIS — D69.6 THROMBOCYTOPENIA: ICD-10-CM

## 2017-05-16 PROCEDURE — 99999 PR PBB SHADOW E&M-EST. PATIENT-LVL IV: CPT | Mod: PBBFAC,,, | Performed by: NURSE PRACTITIONER

## 2017-05-16 PROCEDURE — 3075F SYST BP GE 130 - 139MM HG: CPT | Mod: S$GLB,,, | Performed by: NURSE PRACTITIONER

## 2017-05-16 PROCEDURE — 3044F HG A1C LEVEL LT 7.0%: CPT | Mod: S$GLB,,, | Performed by: NURSE PRACTITIONER

## 2017-05-16 PROCEDURE — 99214 OFFICE O/P EST MOD 30 MIN: CPT | Mod: S$GLB,,, | Performed by: INTERNAL MEDICINE

## 2017-05-16 PROCEDURE — 93000 ELECTROCARDIOGRAM COMPLETE: CPT | Mod: S$GLB,,, | Performed by: INTERNAL MEDICINE

## 2017-05-16 PROCEDURE — 1160F RVW MEDS BY RX/DR IN RCRD: CPT | Mod: S$GLB,,, | Performed by: INTERNAL MEDICINE

## 2017-05-16 PROCEDURE — 3078F DIAST BP <80 MM HG: CPT | Mod: S$GLB,,, | Performed by: INTERNAL MEDICINE

## 2017-05-16 PROCEDURE — 99214 OFFICE O/P EST MOD 30 MIN: CPT | Mod: S$GLB,,, | Performed by: NURSE PRACTITIONER

## 2017-05-16 PROCEDURE — 3060F POS MICROALBUMINURIA REV: CPT | Mod: 8P,S$GLB,, | Performed by: NURSE PRACTITIONER

## 2017-05-16 PROCEDURE — 3074F SYST BP LT 130 MM HG: CPT | Mod: S$GLB,,, | Performed by: INTERNAL MEDICINE

## 2017-05-16 PROCEDURE — 3078F DIAST BP <80 MM HG: CPT | Mod: S$GLB,,, | Performed by: NURSE PRACTITIONER

## 2017-05-16 PROCEDURE — 1160F RVW MEDS BY RX/DR IN RCRD: CPT | Mod: S$GLB,,, | Performed by: NURSE PRACTITIONER

## 2017-05-16 PROCEDURE — 99999 PR PBB SHADOW E&M-EST. PATIENT-LVL III: CPT | Mod: PBBFAC,,, | Performed by: INTERNAL MEDICINE

## 2017-05-16 RX ORDER — LORAZEPAM 1 MG/1
TABLET ORAL
Qty: 30 TABLET | Refills: 0 | Status: SHIPPED | OUTPATIENT
Start: 2017-05-16 | End: 2017-06-13 | Stop reason: SDUPTHER

## 2017-05-16 NOTE — MR AVS SNAPSHOT
[facility] - Arrhythmia  [address]  [facility] [address]  Phone: [phone]  Fax: [phone]                  Donald Warren Abadie   2017 8:00 AM   Office Visit    Description:  Male : 1955   Provider:  Ramon Giang MD   Department:  [facility] - Arrhythmia           Reason for Visit     Atrial Fibrillation                To Do List           Future Appointments        Provider Department Dept Phone    2017 9:20 AM ZULEMA Farrell [facility] - Hepatology [phone]    2017 9:30 AM INJECTION, INFECTIOUS DISEASES [facility]- ID Injection Room [phone]    2017 8:00 AM Bacilio Larry MD [facility] - Internal Medicine [phone]      Goals (5 Years of Data)     None      [facility] On [facility]     [facility] On Call Nurse Care Line -  Assistance  Unless otherwise directed by your provider, please contact [facility], our nurse care line that is available for  assistance.     Registered nurses in the [facility] provide: appointment scheduling, clinical advisement, health education, and other advisory services.  Call: [phone] (toll free)               Medications           Message regarding Medications     Verify the changes and/or additions to your medication regime listed below are the same as discussed with your clinician today.  If any of these changes or additions are incorrect, please notify your healthcare provider.             Verify that the below list of medications is an accurate representation of the medications you are currently taking.  If none reported, the list may be blank. If incorrect, please contact your healthcare provider. Carry this list with you in case of emergency.           Current Medications     allopurinol (ZYLOPRIM) 100 MG tablet TAKE 2 TABLETS BY MOUTH EVERY DAY    aspirin 81 MG Chew Take 1 tablet (81 mg total) by mouth once daily.    diclofenac (VOLTAREN) 75 MG EC tablet TAKE 1 TABLET BY MOUTH TWO TIMES A DAY  "   fenofibrate 160 MG Tab TAKE ONE TABLET BY MOUTH EVERY NIGHT AT BEDTIME    lorazepam (ATIVAN) 1 MG tablet take 1 tablet by mouth three times a day    metoprolol succinate (TOPROL-XL) 25 MG 24 hr tablet take 1 tablet by mouth once daily    metoprolol tartrate (LOPRESSOR) 25 MG tablet Take 1 tablet (25 mg total) by mouth once daily. Pt states he only take this when needed    omega-3 acid ethyl esters (LOVAZA) 1 gram capsule Take 2 g by mouth 2 (two) times daily.    rosuvastatin (CRESTOR) 5 MG tablet take 1 tablet by mouth once daily           Clinical Reference Information           Your Vitals Were     BP Pulse Height Weight BMI    112/70 54 5' 6" (1.676 m) 82.1 kg (181 lb) 29.21 kg/m2      Blood Pressure          Most Recent Value    BP  112/70      Allergies as of 5/16/2017     No Known Drug Allergies      Immunizations Administered on Date of Encounter - 5/16/2017     None      Language Assistance Services     ATTENTION: Language assistance services are available, free of charge. Please call [phone].      ATENCIÓN: Si habla español, tiene a aguilar disposición servicios gratuitos de asistencia lingüística. Llame al [phone].     ANSELMO Ý: N?u b?n nói Ti?ng Vi?t, có các d?ch v? h? tr? ngôn ng? mi?n phí dành cho b?n. G?i s? [phone].         [facility] complies with applicable Federal civil rights laws and does not discriminate on the basis of race, color, national origin, age, disability, or sex.        "

## 2017-05-16 NOTE — MR AVS SNAPSHOT
[facility] - Hepatology  [address]  [place] [address]  Phone: [phone]  Fax: [phone]                  Donald Warren Abadie   2017 9:20 AM   Office Visit    Description:  Male : 1955   Provider:  Marlena Sibley NP   Department:  [facility]           Reason for Visit     Fatty Liver                To Do List           Future Appointments        Provider Department Dept Phone    2017 9:30 AM INJECTION, INFECTIOUS DISEASES [facility]- ID Injection Room [phone]    2017 8:00 AM aBcilio Larry MD [facility] - Internal Medicine [phone]      Goals (5 Years of Data)     None      [facility] On Call     [facility] On Call Nurse Care Line -  Assistance  Unless otherwise directed by your provider, please contact [facility], our nurse care line that is available for  assistance.     Registered nurses in the [facility] provide: appointment scheduling, clinical advisement, health education, and other advisory services.  Call: [phone] (toll free)               Medications           Message regarding Medications     Verify the changes and/or additions to your medication regime listed below are the same as discussed with your clinician today.  If any of these changes or additions are incorrect, please notify your healthcare provider.             Verify that the below list of medications is an accurate representation of the medications you are currently taking.  If none reported, the list may be blank. If incorrect, please contact your healthcare provider. Carry this list with you in case of emergency.           Current Medications     allopurinol (ZYLOPRIM) 100 MG tablet TAKE 2 TABLETS BY MOUTH EVERY DAY    aspirin 81 MG Chew Take 1 tablet (81 mg total) by mouth once daily.    diclofenac (VOLTAREN) 75 MG EC tablet TAKE 1 TABLET BY MOUTH TWO TIMES A DAY    fenofibrate 160 MG Tab TAKE ONE TABLET BY MOUTH EVERY NIGHT AT BEDTIME    lorazepam  "(ATIVAN) 1 MG tablet take 1 tablet by mouth three times a day    metoprolol succinate (TOPROL-XL) 25 MG 24 hr tablet take 1 tablet by mouth once daily    metoprolol tartrate (LOPRESSOR) 25 MG tablet Take 1 tablet (25 mg total) by mouth once daily. Pt states he only take this when needed    omega-3 acid ethyl esters (LOVAZA) 1 gram capsule Take 2 g by mouth 2 (two) times daily.    rosuvastatin (CRESTOR) 5 MG tablet take 1 tablet by mouth once daily           Clinical Reference Information           Your Vitals Were     BP Pulse Temp Resp Height Weight    133/74 (BP Location: Left arm, Patient Position: Sitting, BP Method: Automatic) 51 97.5 °F (36.4 °C) (Oral) 20 5' 6" (1.676 m) 82.2 kg (181 lb 3.5 oz)    SpO2 BMI             100% 29.25 kg/m2         Blood Pressure          Most Recent Value    BP  133/74      Allergies as of 5/16/2017     No Known Drug Allergies      Immunizations Administered on Date of Encounter - 5/16/2017     None      Language Assistance Services     ATTENTION: Language assistance services are available, free of charge. Please call [phone].      ATENCIÓN: Si habla español, tiene a aguilar disposición servicios gratuitos de asistencia lingüística. Llame al [phone].     ANSELMO Ý: N?u b?n nói Ti?ng Vi?t, có các d?ch v? h? tr? ngôn ng? mi?n phí dành cho b?n. G?i s? [phone].         [facility] complies with applicable Federal civil rights laws and does not discriminate on the basis of race, color, national origin, age, disability, or sex.        "

## 2017-05-16 NOTE — PROGRESS NOTES
[facility] Established Patient Visit    Reason for Visit:  F/u fatty liver, alcohol use    PCP: Bacilio Larry    HPI:  This is a 61 y.o. male here for f/u of elevated liver enzymes due to fatty liver and alcohol use/abuse. He has had mildly elevated transaminases with AST > ALT for yrs. Tbili nl for the most part. Alb nl. INR nl. Plts have been intermittently low but spleen nl on imaging. Ferritin is elevated but with normal serum iron and iron sat. He had recent U/s that showed enlarged liver at 18.3 cm with steatosis. No ascites or masses. He started Crestor last year for his cholesterol TGL are high but this is familial. He is not on any medications for his DM, diet controlled currently.    His viral hepatitis B and C were negative. He has started vaccines for hepatitis A and B since he was not immune. His Fibroscan suggested possible F2, moderate fibrosis. He did decrease his alcohol intake some more. Reports he has not drank in last week because of his episode of A-fib had him feeling bad. He denies any symptoms of advanced chronic liver disease including jaundice, dark urine, hematemesis, melena, slowed mentation, abdominal distention.      He was supposed to have MRI elastography before this visit to further stage his fibrosis but was concerned about being claustrophobic.       ROS:   GENERAL: Denies fever, chills, weight loss/gain, fatigue  HEENT: Denies headaches, dizziness, vision/hearing changes  CARDIOVASCULAR: Denies chest pain, (+) palpitations, no edema  RESPIRATORY: Denies dyspnea, cough  GI: Denies abdominal pain, rectal bleeding, nausea, vomiting. No change in bowel pattern or color  : Denies dysuria, hematuria   SKIN: Denies rash, itching   NEURO: Denies confusion, memory loss, or mood changes  PSYCH: Denies depression or anxiety  HEME/LYMPH: Denies easy bruising or bleeding      PMHX:  has a past medical history of A-fib; Alcohol abuse; Arthritis; Chronic gout; Diabetes mellitus;  "DM (diabetes mellitus) (11/16/2016); Fatty liver; Hyperlipidemia; and Renal cell cancer (2014).    PSHX:  has a past surgical history that includes Abscess drainage; Hand surgery (Left); Partial nephrectomy (Left, August 2014); and Colonoscopy.    The patient's social and family histories were reviewed by me and updated in the appropriate section of the electronic medical record.    Review of patient's allergies indicates:   Allergen Reactions    No known drug allergies        Medications reviewed in Epic      Objective Findings:    PHYSICAL EXAM:   Friendly White male, in no acute distress; alert and oriented to person, place and time  VITALS:   /74 (BP Location: Left arm, Patient Position: Sitting, BP Method: Automatic)  Pulse (!) 51  Temp 97.5 °F (36.4 °C) (Oral)   Resp 20  Ht 5' 6" (1.676 m)  Wt 82.2 kg (181 lb 3.5 oz)  SpO2 100%  BMI 29.25 kg/m2  HENT: Normocephalic, without obvious abnormality. Oral mucosa pink and moist. Dentition good.  EYES: Sclerae anicteric. No conjunctival pallor.   NECK: Supple.   CARDIOVASCULAR: Regular rate and rhythm. No murmurs.  RESPIRATORY: Normal respiratory effort. BBS CTA. No wheezes or crackles.  GI: Soft, non-tender, non-distended. (+) hepatomegaly. No masses palpable. No ascites.  EXTREMITIES:  No clubbing, cyanosis or edema.  SKIN: Warm and dry. No jaundice. No rashes noted to exposed skin. (+) telangectasias noted. No palmar erythema.  NEURO:  Normal gate. No asterixis.  PSYCH:  Memory intact. Thought and speech pattern appropriate. Behavior normal. No depression or anxiety noted.      Labs:  Lab Results   Component Value Date    WBC 6.88 10/13/2016    HGB 15.3 10/13/2016    HCT 43.3 10/13/2016     (L) 10/13/2016    CHOL 152 10/13/2016    TRIG 470 (H) 10/13/2016    HDL 22 (L) 10/13/2016     12/21/2016    K 4.4 12/21/2016    CREATININE 1.1 12/21/2016    ALT 28 02/16/2017    AST 51 (H) 02/16/2017    ALKPHOS 54 (L) 02/16/2017    BILITOT 0.8 " 02/16/2017    ALBUMIN 4.0 02/16/2017    INR 1.1 11/16/2016       ASSESSMENT:  61 y.o. White male with:  1.  Elevated liver enzymes, due to fatty liver due to alcohol abuse and overweight, DM, and HLD  -- AST > ALT  -- nl alk phos and Tbili   -- imaging shows hepatomegaly with steatosis  -- ferritin elevated but suspect d/t alcohol and inflammation  2. Fatty liver, due to both alcohol and NAFLD likely since he has DM And HLD and is overweight  -- transaminases mildly elevated, AST > ALT  -- US shows hepatomegaly with steatosis  -- synthetic liver function nl  -- risk factors for fatty liver include obesity, DM, HLD, alcohol abuse  -- Fibroscan = F2  -- s/p 2 of 3 Twinrix vaccines  3. Alcohol abuse  4. Elevated ferritin, suspect d/t alcohol use and inflammation  -- normal serum iron and iron sat but will check HH DNA analysis today  5. Thrombocytopenia, intermittent  -- spleen nl at 10.4 cm on u/s       EDUCATION:   We discussed the manifestations of fatty liver disease. At this time there is no FDA approved therapy for NAFLD.   The patient and I discussed the importance of diet, exercise, and weight loss for management of NAFLD. Discussed stopping alcohol as well since alcohol is definitely contributing to this as well.     We discussed a low fat, low carb/low sugar, high fiber diet, and a goal of 30 minutes of exercise 5 times per week.   Pt is aware that fatty liver can progress to steatohepatitis and possibly to cirrhosis, putting one at increased risk for liver cancer, liver failure, and death.      Pt is aware that excessive alcohol use can also lead to cirrhosis, putting one at increased risk for liver cancer, liver failure, and death.     Discussed that he definitely has at least moderate fibrosis based on Fibroscan results but want to assess this further with MR elastography since he has intermittent thrombocytopenia, telangiectasias. If his scan does show F3 or F4, this may be a motivating factor to get him  to quit alcohol completely.        PLAN:  1. MR elastography  2. Continue to minimize alcohol use and stop completely if possible  3. Weight loss, goal of 10 lbs  4. Last Twinrix next week as scheduled  5. F/u in 3 months    Thank you for allowing me to participate in the care of Donald Warren Abadie Alicia C Debautte, ZULEMA-C

## 2017-05-16 NOTE — PROGRESS NOTES
Subjective:    Patient ID:  Donald Warren Abadie is a 61 y.o. male who presents for follow-up of Atrial Fibrillation      HPI Comments: 61 yoM pAF here for follow up.    Prior visits: He felt palpitations 6/14 leading to an ER visit. There he was diagnosed with AF. He received diltiazem and ultimately got ativan which converted him to NSR. He felt fatigue, rapid palpitations. No chest pain, sob, syncope with his AF. He has had two other instances of AF, one while working in the heat. No known triggers or alleviating factors. He is on aspirin for CVA prophylaxis (CHADSVASC of 0). He denies ever experiencing similar symptoms prior to his diagnosis. He has had some hypotension on metoprolol 25 mg qd.     2/11/2015: He had a renal mass detected on CT scan performed for a hepatic workup. He ultimately had partial resection with ultimate diagnosis of RCC. He tolerated the surgery well without developing AF.    2/16: Pt reports that overall he feels well, but over the last month he reports experiencing palpitations when he goes to bed at night. He reports approximately 5 episodes this month.    8/16: He continues to have infrequent episodes, once every 2-4 weeks lasting up to 4-5 hours. He has dropped his metoprolol to 12.5 mg once a day due to fatigue.     Interval history: Over the past month he has had episodes twice a week lasting 8 hours at a time. He feels that the episodes are related to EtOH intake. The episodes are lasting longer and are more symptomatic. He has stopped EtOH use which has reduced the AF episodes. He remains reluctant to take OAC. He was diagnosed with borderline DM.     Past Medical History:  No date: A-fib  No date: Alcohol abuse  No date: Arthritis  No date: Chronic gout  No date: Diabetes mellitus  11/16/2016: DM (diabetes mellitus)  No date: Fatty liver  No date: Hyperlipidemia  2014: Renal cell cancer    Past Surgical History:  No date: ABSCESS DRAINAGE      Comment: perirectal  No date:  COLONOSCOPY  No date: HAND SURGERY Left  August 2014: PARTIAL NEPHRECTOMY Left    Social History    Marital status: Single              Spouse name:                       Years of education:                 Number of children:               Occupational History  Occupation          Employer            Comment                                   [employer]     Social History Main Topics    Smoking status: Never Smoker                                                                Smokeless status: Never Used                        Alcohol use: Yes           3.0 oz/week       5 Cans of beer per week       Comment: patient states he drinks less than he use                to    Drug use: No              Sexual activity: Not on file          Other Topics            Concern    None on file    Social History Narrative    None on file    Review of patient's family history indicates:    Lung cancer                    Father                    Cancer                         Father                    Diabetes                       Mother                    Lung cancer                    Sister                    Colon polyps                   Brother                   Cirrhosis                      Neg Hx                        Atrial Fibrillation   Symptoms include palpitations. Symptoms are negative for chest pain, dizziness, shortness of breath, syncope and weakness. Past medical history includes atrial fibrillation.       Review of Systems   Constitution: Negative. Negative for chills, decreased appetite, diaphoresis, fever, weakness, malaise/fatigue, night sweats, weight gain and weight loss.   HENT: Negative.    Eyes: Negative.  Negative for blurred vision, double vision, visual disturbance and visual halos.   Cardiovascular: Positive for irregular heartbeat and palpitations. Negative for chest pain, claudication, cyanosis, dyspnea on exertion, leg swelling, near-syncope, orthopnea, paroxysmal nocturnal dyspnea  and syncope.   Respiratory: Negative.  Negative for cough, hemoptysis, shortness of breath, sleep disturbances due to breathing, snoring, sputum production and wheezing.    Endocrine: Negative.  Negative for cold intolerance and heat intolerance.   Hematologic/Lymphatic: Negative.  Negative for adenopathy and bleeding problem. Does not bruise/bleed easily.   Skin: Negative.  Negative for color change, flushing, nail changes and poor wound healing.   Musculoskeletal: Negative.  Negative for falls, joint swelling, muscle cramps, muscle weakness, myalgias and neck pain.   Gastrointestinal: Negative.  Negative for heartburn, hematemesis, jaundice, nausea and vomiting.   Genitourinary: Negative.    Neurological: Negative.  Negative for disturbances in coordination, dizziness, focal weakness, light-headedness, loss of balance, numbness, paresthesias, seizures, sensory change, tremors and vertigo.   Psychiatric/Behavioral: Negative.  Negative for altered mental status and memory loss. The patient is not nervous/anxious.         Objective:    Physical Exam   Constitutional: He is oriented to person, place, and time. He appears well-developed and well-nourished. No distress.   HENT:   Head: Normocephalic and atraumatic.   Eyes: Conjunctivae and EOM are normal. Pupils are equal, round, and reactive to light. Right eye exhibits no discharge. Left eye exhibits no discharge.   Neck: Normal range of motion. Neck supple. No JVD present. No thyromegaly present.   Cardiovascular: Normal rate, regular rhythm, normal heart sounds and intact distal pulses.    No murmur heard.  Pulmonary/Chest: Effort normal and breath sounds normal. No respiratory distress. He has no wheezes.   Abdominal: Soft. Bowel sounds are normal. He exhibits no distension. There is no tenderness.   Musculoskeletal: Normal range of motion. He exhibits no edema.   Neurological: He is alert and oriented to person, place, and time. No cranial nerve deficit.   Skin:  Skin is warm and dry. No rash noted. He is not diaphoretic. No erythema.   Psychiatric: He has a normal mood and affect. His behavior is normal. Judgment and thought content normal.   Vitals reviewed.    ECG: SBr nl NE, QRS< QTc        Assessment:       1. Paroxysmal atrial fibrillation         Plan:       61 yoM pAF, HTN here for AF management. He has increasing AF episodes related to increased EtOH use. He will make lifestyle changes and assess his symptomatic AF burden. He is reluctant to take OAC but would do so if his AF does not subside. If he has recurrent AF, he would prefer PVI as opposed to additional medical therapy. RTC 3m or as needed.

## 2017-06-05 ENCOUNTER — HOSPITAL ENCOUNTER (OUTPATIENT)
Dept: RADIOLOGY | Facility: HOSPITAL | Age: 62
Discharge: HOME | End: 2017-06-05
Attending: INTERNAL MEDICINE
Payer: COMMERCIAL

## 2017-06-05 ENCOUNTER — OFFICE VISIT (OUTPATIENT)
Dept: INTERNAL MEDICINE | Facility: CLINIC | Age: 62
End: 2017-06-05
Payer: COMMERCIAL

## 2017-06-05 ENCOUNTER — HOSPITAL ENCOUNTER (OUTPATIENT)
Dept: RADIOLOGY | Facility: HOSPITAL | Age: 62
Discharge: HOME | End: 2017-06-05
Attending: NURSE PRACTITIONER
Payer: COMMERCIAL

## 2017-06-05 VITALS
SYSTOLIC BLOOD PRESSURE: 120 MMHG | HEART RATE: 60 BPM | HEIGHT: 66 IN | DIASTOLIC BLOOD PRESSURE: 60 MMHG | WEIGHT: 184.94 LBS | BODY MASS INDEX: 29.72 KG/M2

## 2017-06-05 DIAGNOSIS — F10.10 ALCOHOL ABUSE: ICD-10-CM

## 2017-06-05 DIAGNOSIS — G89.29 CHRONIC PAIN OF RIGHT KNEE: ICD-10-CM

## 2017-06-05 DIAGNOSIS — I48.0 PAROXYSMAL ATRIAL FIBRILLATION: ICD-10-CM

## 2017-06-05 DIAGNOSIS — R79.89 ELEVATED FERRITIN: ICD-10-CM

## 2017-06-05 DIAGNOSIS — K76.0 FATTY LIVER: ICD-10-CM

## 2017-06-05 DIAGNOSIS — G89.29 CHRONIC PAIN OF RIGHT KNEE: Primary | ICD-10-CM

## 2017-06-05 DIAGNOSIS — R74.8 ELEVATED LIVER ENZYMES: ICD-10-CM

## 2017-06-05 DIAGNOSIS — M25.561 CHRONIC PAIN OF RIGHT KNEE: Primary | ICD-10-CM

## 2017-06-05 DIAGNOSIS — M25.561 CHRONIC PAIN OF RIGHT KNEE: ICD-10-CM

## 2017-06-05 PROCEDURE — 73562 X-RAY EXAM OF KNEE 3: CPT | Mod: TC,RT

## 2017-06-05 PROCEDURE — 99999 PR PBB SHADOW E&M-EST. PATIENT-LVL III: CPT | Mod: PBBFAC,,, | Performed by: INTERNAL MEDICINE

## 2017-06-05 PROCEDURE — 99213 OFFICE O/P EST LOW 20 MIN: CPT | Mod: S$GLB,,, | Performed by: INTERNAL MEDICINE

## 2017-06-05 PROCEDURE — 73562 X-RAY EXAM OF KNEE 3: CPT | Mod: 26,RT,, | Performed by: RADIOLOGY

## 2017-06-05 NOTE — PROGRESS NOTES
CHIEF COMPLAINT:  Right knee pain.    HISTORY OF PRESENT ILLNESS:  The patient is a 61-year-old gentleman who comes in   today with complaints of right knee pain.  Symptoms started a few months ago   and were off and on.  The pain is most intense on the medial aspect of the knee.    Sometimes he cannot let both knees rub up against each other.  Sometimes the   pain is inside the knee.  He does take Voltaren for gout, which does appear to   help some.  He has tried some creams for his knee to generate heat.  This seems   to help some as well.    REVIEW OF SYSTEMS:  The patient does have a history of AFib secondary to alcohol   use.  The patient is still drinking about six beers a day.  He was seen by   Marlena Sibley in Hepatology.  He has an MRI of the liver today.  The patient   has not seen Addiction Psychiatry yet.    PHYSICAL EXAMINATION:  GENERAL APPEARANCE:  No acute distress.  HEENT:  Conjunctivae are clear.  PULMONARY:  Good inspiratory, expiratory breath sounds are heard.  Lungs are   clear to auscultation.  CARDIOVASCULAR:  S1, S2.  Rhythm currently appears to be normal.  EXTREMITIES:  With trace edema.  ABDOMEN:  Nontender, nondistended.  I did not appreciate any hepatomegaly.  The patient's right knee was evaluated.  The patient had a negative anterior,   posterior drawer sign and good collateral stability.    ASSESSMENT:  1.  Right knee pain.  2.  Alcohol abuse.  3.  Fatty liver.  4.  AFib.    PLAN:  Did discuss with the patient that I want to refer him to Addiction   Psychiatry.  The patient appears to be agreeable to this.  We will put in a   referral for this.  The patient was asked to try to cut back to no more than   four beers a day.  We will schedule an x-ray of his knee.  The patient already   has an MRI scheduled for his abdomen today.      ORLANDO/DEJON  dd: 06/05/2017 08:49:59 (CDT)  td: 06/05/2017 14:35:02 (CDT)  Doc ID   #[id number]  Job ID #[id number]    CC:

## 2017-06-13 ENCOUNTER — HOSPITAL ENCOUNTER (OUTPATIENT)
Dept: RADIOLOGY | Facility: HOSPITAL | Age: 62
Discharge: HOME | End: 2017-06-13
Attending: ORTHOPAEDIC SURGERY
Payer: COMMERCIAL

## 2017-06-13 DIAGNOSIS — G89.29 CHRONIC PAIN OF RIGHT KNEE: Primary | ICD-10-CM

## 2017-06-13 DIAGNOSIS — M25.561 ACUTE PAIN OF RIGHT KNEE: Primary | ICD-10-CM

## 2017-06-13 DIAGNOSIS — M25.561 ACUTE PAIN OF RIGHT KNEE: ICD-10-CM

## 2017-06-13 DIAGNOSIS — M25.561 CHRONIC PAIN OF RIGHT KNEE: Primary | ICD-10-CM

## 2017-06-13 RX ORDER — LORAZEPAM 1 MG/1
TABLET ORAL
Qty: 30 TABLET | Refills: 0 | Status: SHIPPED | OUTPATIENT
Start: 2017-06-13 | End: 2017-07-11 | Stop reason: SDUPTHER

## 2017-06-13 NOTE — TELEPHONE ENCOUNTER
----- Message from Sil Alejandre sent at 6/13/2017 10:33 AM CDT -----  Contact: Chani (daughter), phone [phone]  Type: Returning a call    Who left a message? Thawanda     When did the practice call? Today     Comments: Please call Chani.     Thanks!

## 2017-06-13 NOTE — TELEPHONE ENCOUNTER
----- Message from Sil Alejandre sent at 6/9/2017  2:25 PM CDT -----  Contact: Chani (daughter), phone [phone]  Type: Test Results    What test was performed? Knee x-ray     Who ordered the test? Dr. Larry     When and where were the test performed?  6/5/17, [facility].     Comments: Please call with results.    Thanks!

## 2017-06-13 NOTE — TELEPHONE ENCOUNTER
Please advise xray results . Pt daughter want to know can pt get any injection to help his knees out . Pt daughter states she heard about platelet injection or a steroid injection

## 2017-06-13 NOTE — TELEPHONE ENCOUNTER
----- Message from Sil Alejandre sent at 6/12/2017  3:46 PM CDT -----  Contact: Chani (daughter), phone [phone]  Type: Test Results    What test was performed? Knee x-ray     Who ordered the test? Dr. Larry     When and where were the test performed?  6/5/17    Comments:  Please call the patient with the results.    Thanks!

## 2017-06-14 ENCOUNTER — OFFICE VISIT (OUTPATIENT)
Dept: RHEUMATOLOGY | Facility: CLINIC | Age: 62
End: 2017-06-14
Payer: COMMERCIAL

## 2017-06-14 ENCOUNTER — PATIENT MESSAGE (OUTPATIENT)
Dept: RHEUMATOLOGY | Facility: CLINIC | Age: 62
End: 2017-06-14

## 2017-06-14 ENCOUNTER — TELEPHONE (OUTPATIENT)
Dept: ORTHOPEDICS | Facility: CLINIC | Age: 62
End: 2017-06-14

## 2017-06-14 DIAGNOSIS — M1A.09X0 IDIOPATHIC CHRONIC GOUT OF MULTIPLE SITES WITHOUT TOPHUS: ICD-10-CM

## 2017-06-14 DIAGNOSIS — M70.51 PES ANSERINUS BURSITIS OF RIGHT KNEE: Primary | ICD-10-CM

## 2017-06-14 DIAGNOSIS — M17.11 PRIMARY OSTEOARTHRITIS OF RIGHT KNEE: ICD-10-CM

## 2017-06-14 PROCEDURE — 20610 DRAIN/INJ JOINT/BURSA W/O US: CPT | Mod: S$GLB,,, | Performed by: INTERNAL MEDICINE

## 2017-06-14 PROCEDURE — 99213 OFFICE O/P EST LOW 20 MIN: CPT | Mod: 25,S$GLB,, | Performed by: INTERNAL MEDICINE

## 2017-06-14 PROCEDURE — 99999 PR PBB SHADOW E&M-EST. PATIENT-LVL II: CPT | Mod: PBBFAC,,, | Performed by: INTERNAL MEDICINE

## 2017-06-14 RX ORDER — TRIAMCINOLONE ACETONIDE 40 MG/ML
40 INJECTION, SUSPENSION INTRA-ARTICULAR; INTRAMUSCULAR
Status: DISCONTINUED | OUTPATIENT
Start: 2017-06-14 | End: 2017-06-14 | Stop reason: HOSPADM

## 2017-06-14 RX ADMIN — TRIAMCINOLONE ACETONIDE 40 MG: 40 INJECTION, SUSPENSION INTRA-ARTICULAR; INTRAMUSCULAR at 04:06

## 2017-06-14 NOTE — TELEPHONE ENCOUNTER
----- Message from Jose Meade sent at 6/13/2017  4:52 PM CDT -----  Contact: Daughter  Daughter states patient wants to see a doctor not a PA or NP ask for a call to reschedule

## 2017-06-14 NOTE — PROGRESS NOTES
History of present illness:  61-year-old gentleman I been following for gout.  He also has a history of occasional pain in the right knee.  The right knee started bothering him again in 3 months ago.  The pain was of gradual onset.  He had no history of trauma.  The pain has been constant.  It is worse with activity.  It does wake him up at night.  It is bad when he crosses his legs.  It is better in the morning.  It is different from his previous gout attacks.  He has no pain in the left knee.  The pain does not radiate down the leg.  It does interfere with activity.  He has noticed some swelling in the knee.  There is no erythema or increased warmth.    He remains on allopurinol 200 mg daily.  Voltaren has given him some relief.  Topical medications have helped.  He has not used heat or ice.    Physical examination:  Musculoskeletal: Left knee is unremarkable.  He has an effusion in the right knee.  There is no erythema or increased warmth.  He has some mild tenderness in the joint line.  He is exquisitely tender in the right anserine bursa.  He has good range of motion of the knee without much pain on range of motion of the knee  Radiology: X-ray of the knee shows medial compartment narrowing    Assessment: Even though he has evidence of osteoarthritis of the knee, I think the main pain generator at this time is her anserine bursitis    Plans: I will see how he responds to the bursa injection.  If this does not help completely, I will bring him back in next week and do the knee joint itself.  He is otherwise to keep his previous appointment.

## 2017-06-14 NOTE — PROCEDURES
Large Joint Aspiration/Injection  Date/Time: 6/14/2017 4:15 PM  Performed by: JAY CANCHOLA  Authorized by: JAY CANCHOLA     Consent Done?:  Yes (Verbal)  Indications:  Pain  Procedure site marked: Yes      Location:  Knee  Site:  R knee  Needle size:  25 G  Approach:  Medial  Medications:  40 mg triamcinolone acetonide 40 mg/mL  Patient tolerance:  Patient tolerated the procedure well with no immediate complications    Additional Comments: Injection was in the anserine bursa

## 2017-06-19 ENCOUNTER — PATIENT MESSAGE (OUTPATIENT)
Dept: RHEUMATOLOGY | Facility: CLINIC | Age: 62
End: 2017-06-19

## 2017-06-20 ENCOUNTER — OFFICE VISIT (OUTPATIENT)
Dept: RHEUMATOLOGY | Facility: CLINIC | Age: 62
End: 2017-06-20
Payer: COMMERCIAL

## 2017-06-20 DIAGNOSIS — M17.11 PRIMARY OSTEOARTHRITIS OF RIGHT KNEE: Primary | ICD-10-CM

## 2017-06-20 PROCEDURE — 99999 PR PBB SHADOW E&M-EST. PATIENT-LVL I: CPT | Mod: PBBFAC,,, | Performed by: INTERNAL MEDICINE

## 2017-06-20 PROCEDURE — 20610 DRAIN/INJ JOINT/BURSA W/O US: CPT | Mod: RT,S$GLB,, | Performed by: INTERNAL MEDICINE

## 2017-06-20 PROCEDURE — 99499 UNLISTED E&M SERVICE: CPT | Mod: S$GLB,,, | Performed by: INTERNAL MEDICINE

## 2017-06-20 RX ORDER — TRIAMCINOLONE ACETONIDE 40 MG/ML
40 INJECTION, SUSPENSION INTRA-ARTICULAR; INTRAMUSCULAR
Status: DISCONTINUED | OUTPATIENT
Start: 2017-06-20 | End: 2017-06-20 | Stop reason: HOSPADM

## 2017-06-20 RX ADMIN — TRIAMCINOLONE ACETONIDE 40 MG: 40 INJECTION, SUSPENSION INTRA-ARTICULAR; INTRAMUSCULAR at 07:06

## 2017-06-20 NOTE — PROGRESS NOTES
History of present illness: 61-year-old gentleman I follow for gout.  He also has osteoarthritis.  I saw him last week because of a 3 month history of right knee pain.  He had evidence of both osteoarthritis and anserine bursitis.  I injected the anserine bursa.  He states he is feeling 50% better but still has problems in the knee.  The bursa pain is doing better.  He actually has less effusion in the knee.  He has tenderness in the joint line.  I injected the joint today.  He is to keep his previous appointment.

## 2017-06-20 NOTE — PROCEDURES
Large Joint Aspiration/Injection  Date/Time: 6/20/2017 7:51 AM  Performed by: JAY CANCHOLA  Authorized by: JAY CANCHOLA     Consent Done?:  Yes (Verbal)  Indications:  Pain  Procedure site marked: Yes      Location:  Knee  Site:  R knee  Prep: Patient was prepped and draped in usual sterile fashion    Needle size:  25 G  Approach:  Medial  Medications:  40 mg triamcinolone acetonide 40 mg/mL  Patient tolerance:  Patient tolerated the procedure well with no immediate complications

## 2017-07-03 ENCOUNTER — TELEPHONE (OUTPATIENT)
Dept: HEPATOLOGY | Facility: CLINIC | Age: 62
End: 2017-07-03

## 2017-07-06 ENCOUNTER — PATIENT MESSAGE (OUTPATIENT)
Dept: HEPATOLOGY | Facility: CLINIC | Age: 62
End: 2017-07-06

## 2017-07-10 RX ORDER — METOPROLOL SUCCINATE 25 MG/1
TABLET, EXTENDED RELEASE ORAL
Qty: 30 TABLET | Refills: 5 | Status: SHIPPED | OUTPATIENT
Start: 2017-07-10 | End: 2017-12-27 | Stop reason: SDUPTHER

## 2017-07-10 RX ORDER — METOPROLOL TARTRATE 25 MG/1
TABLET, FILM COATED ORAL
Qty: 30 TABLET | Refills: 5 | Status: SHIPPED | OUTPATIENT
Start: 2017-07-10 | End: 2017-07-11 | Stop reason: SDUPTHER

## 2017-07-12 RX ORDER — LORAZEPAM 1 MG/1
TABLET ORAL
Qty: 30 TABLET | Refills: 0 | Status: SHIPPED | OUTPATIENT
Start: 2017-07-12 | End: 2017-08-10 | Stop reason: SDUPTHER

## 2017-07-12 RX ORDER — METOPROLOL TARTRATE 25 MG/1
TABLET, FILM COATED ORAL
Qty: 30 TABLET | Refills: 8 | Status: SHIPPED | OUTPATIENT
Start: 2017-07-12 | End: 2017-12-27 | Stop reason: SDUPTHER

## 2017-07-20 DIAGNOSIS — M25.561 RIGHT KNEE PAIN, UNSPECIFIED CHRONICITY: Primary | ICD-10-CM

## 2017-08-01 ENCOUNTER — TELEPHONE (OUTPATIENT)
Dept: ORTHOPEDICS | Facility: CLINIC | Age: 62
End: 2017-08-01

## 2017-08-01 ENCOUNTER — PATIENT MESSAGE (OUTPATIENT)
Dept: INTERNAL MEDICINE | Facility: CLINIC | Age: 62
End: 2017-08-01

## 2017-08-01 ENCOUNTER — PATIENT MESSAGE (OUTPATIENT)
Dept: HEPATOLOGY | Facility: CLINIC | Age: 62
End: 2017-08-01

## 2017-08-01 NOTE — TELEPHONE ENCOUNTER
----- Message from Lenora Dennis sent at 8/1/2017  2:47 PM CDT -----  Contact: daughter- Chani  Pt's daughter called stating that pt recently had an xray done at [facility] and needed to know if the pt needed another one. Please call her at [phone]

## 2017-08-01 NOTE — TELEPHONE ENCOUNTER
Spoke to Chani and she was notified the pt would need to have a new xray at his visit. She understood.

## 2017-08-04 ENCOUNTER — TELEPHONE (OUTPATIENT)
Dept: INTERNAL MEDICINE | Facility: CLINIC | Age: 62
End: 2017-08-04

## 2017-08-04 DIAGNOSIS — I48.91 ATRIAL FIBRILLATION, UNSPECIFIED TYPE: ICD-10-CM

## 2017-08-04 DIAGNOSIS — Z12.5 SCREENING FOR PROSTATE CANCER: Primary | ICD-10-CM

## 2017-08-04 DIAGNOSIS — R73.09 ELEVATED GLUCOSE: ICD-10-CM

## 2017-08-04 DIAGNOSIS — E78.5 HYPERLIPIDEMIA, UNSPECIFIED HYPERLIPIDEMIA TYPE: ICD-10-CM

## 2017-08-04 NOTE — TELEPHONE ENCOUNTER
----- Message from Bacilio Larry MD sent at 8/4/2017  6:38 AM CDT -----  Please schedule blood tests for Aug 9 th. Orders are in.

## 2017-08-08 ENCOUNTER — TELEPHONE (OUTPATIENT)
Dept: ORTHOPEDICS | Facility: CLINIC | Age: 62
End: 2017-08-08

## 2017-08-09 ENCOUNTER — OFFICE VISIT (OUTPATIENT)
Dept: ORTHOPEDICS | Facility: CLINIC | Age: 62
End: 2017-08-09
Payer: COMMERCIAL

## 2017-08-09 ENCOUNTER — PATIENT MESSAGE (OUTPATIENT)
Dept: INTERNAL MEDICINE | Facility: CLINIC | Age: 62
End: 2017-08-09

## 2017-08-09 ENCOUNTER — HOSPITAL ENCOUNTER (OUTPATIENT)
Dept: RADIOLOGY | Facility: HOSPITAL | Age: 62
Discharge: HOME | End: 2017-08-09
Attending: ORTHOPAEDIC SURGERY
Payer: COMMERCIAL

## 2017-08-09 VITALS — HEIGHT: 66 IN | WEIGHT: 184 LBS | BODY MASS INDEX: 29.57 KG/M2

## 2017-08-09 DIAGNOSIS — M17.11 PRIMARY OSTEOARTHRITIS OF RIGHT KNEE: Primary | ICD-10-CM

## 2017-08-09 DIAGNOSIS — M25.561 RIGHT KNEE PAIN, UNSPECIFIED CHRONICITY: ICD-10-CM

## 2017-08-09 PROCEDURE — 73562 X-RAY EXAM OF KNEE 3: CPT | Mod: 26,RT,, | Performed by: RADIOLOGY

## 2017-08-09 PROCEDURE — 99203 OFFICE O/P NEW LOW 30 MIN: CPT | Mod: S$GLB,,, | Performed by: ORTHOPAEDIC SURGERY

## 2017-08-09 PROCEDURE — 99999 PR PBB SHADOW E&M-EST. PATIENT-LVL II: CPT | Mod: PBBFAC,,, | Performed by: ORTHOPAEDIC SURGERY

## 2017-08-09 PROCEDURE — 73560 X-RAY EXAM OF KNEE 1 OR 2: CPT | Mod: 26,LT,, | Performed by: RADIOLOGY

## 2017-08-09 PROCEDURE — 3008F BODY MASS INDEX DOCD: CPT | Mod: S$GLB,,, | Performed by: ORTHOPAEDIC SURGERY

## 2017-08-09 PROCEDURE — 73560 X-RAY EXAM OF KNEE 1 OR 2: CPT | Mod: TC,LT

## 2017-08-09 RX ORDER — HYALURONATE SODIUM 20 MG/2 ML
20 SYRINGE (ML) INTRAARTICULAR WEEKLY
Status: DISCONTINUED | OUTPATIENT
Start: 2017-08-09 | End: 2018-08-06

## 2017-08-09 NOTE — LETTER
August 9, 2017      Bacilio Larry MD  [address]  [facility] [address]           [facility] - Orthoped[facility]  [address]  [facility] [address]  Phone: [phone]          Patient: Donald Warren Abadie   MR Number: [id number]   YOB: 1955   Date of Visit: 8/9/2017       Dear Dr. Bacilio Larry:    Thank you for referring Donald Abadie to me for evaluation. Attached you will find relevant portions of my assessment and plan of care.    If you have questions, please do not hesitate to call me. I look forward to following Donald Abadie along with you.    Sincerely,    Mikael Huerta MD    Enclosure  CC:  No Recipients    If you would like to receive this communication electronically, please contact [email or web address] or [phone] to request more information on [employer] Link access.    For providers and/or their staff who would like to refer a patient to [facility], please contact us through our one-stop-shop provider referral line, [facility], at [phone].    If you feel you have received this communication in error or would no longer like to receive these types of communications, please e-mail [email or web address]

## 2017-08-09 NOTE — PROGRESS NOTES
Subjective:      Patient ID: Donald Warren Abadie is a 61 y.o. male.    Chief Complaint: Pain of the Left Knee and Pain of the Right Knee    HPI   Donald Warren Abadie is a 61 year old male here with a 6 month history of right knee pain. The patient is a  Recently retired . There was not a history of trauma.  The pain is moderate to severe. The pain is located in the medial aspect of the knee. There is is not radiation. There is not catching or locking.   The pain is described as achy. The patient has not had prior surgery. It is aggravated by standing and walking.  It is not alleviated by rest. There is not numbness or tingling of the lower extremity.  There is not back pain.  He  has tried medications and injections. They have not helped.  He does have difficulty getting in or out of a car, getting dressed, or going up or down stairs.  The patient does not use an assistive device.    Past Medical History:   Diagnosis Date    A-fib     Alcohol abuse     Arthritis     Chronic gout     Diabetes mellitus     DM (diabetes mellitus) 11/16/2016    Fatty liver     Hyperlipidemia     Renal cell cancer 2014       Current Outpatient Prescriptions on File Prior to Visit   Medication Sig Dispense Refill    allopurinol (ZYLOPRIM) 100 MG tablet TAKE 2 TABLETS BY MOUTH EVERY DAY 60 tablet 12    diclofenac (VOLTAREN) 75 MG EC tablet TAKE 1 TABLET BY MOUTH TWO TIMES A DAY 60 tablet 12    fenofibrate 160 MG Tab TAKE ONE TABLET BY MOUTH EVERY NIGHT AT BEDTIME 90 tablet 3    lorazepam (ATIVAN) 1 MG tablet take 1 tablet by mouth three times a day 30 tablet 0    metoprolol succinate (TOPROL-XL) 25 MG 24 hr tablet take 1 tablet by mouth once daily 30 tablet 5    metoprolol tartrate (LOPRESSOR) 25 MG tablet take 1 tablet by mouth once daily 30 tablet 8    omega-3 acid ethyl esters (LOVAZA) 1 gram capsule Take 2 g by mouth 2 (two) times daily.      rosuvastatin (CRESTOR) 5 MG tablet take 1 tablet by  mouth once daily 90 tablet 3    aspirin 81 MG Chew Take 1 tablet (81 mg total) by mouth once daily. 30 tablet 11     No current facility-administered medications on file prior to visit.        Past Surgical History:   Procedure Laterality Date    ABSCESS DRAINAGE      perirectal    COLONOSCOPY      HAND SURGERY Left     PARTIAL NEPHRECTOMY Left August 2014       Family History   Problem Relation Age of Onset    Lung cancer Father     Cancer Father     Diabetes Mother     Lung cancer Sister     Colon polyps Brother     Cirrhosis Neg Hx        Social History     Social History    Marital status: Single     Spouse name: N/A    Number of children: N/A    Years of education: N/A     Occupational History     [employer]     Social History Main Topics    Smoking status: Never Smoker    Smokeless tobacco: Never Used    Alcohol use 3.0 oz/week     5 Cans of beer per week      Comment: patient states he drinks less than he use to    Drug use: No    Sexual activity: Not on file     Other Topics Concern    Not on file     Social History Narrative    No narrative on file       Review of Systems   Constitution: Negative for chills, fever and night sweats.   HENT: Negative for headaches and hearing loss.    Eyes: Negative for blurred vision and double vision.   Cardiovascular: Negative for chest pain, claudication and leg swelling.   Respiratory: Negative for shortness of breath.    Endocrine: Negative for polydipsia, polyphagia and polyuria.   Hematologic/Lymphatic: Negative for adenopathy and bleeding problem. Does not bruise/bleed easily.   Skin: Negative for poor wound healing.   Musculoskeletal: Positive for joint pain.   Gastrointestinal: Negative for diarrhea and heartburn.   Genitourinary: Negative for bladder incontinence.   Neurological: Negative for focal weakness, numbness, paresthesias and sensory change.   Psychiatric/Behavioral: The patient is not nervous/anxious.     Allergic/Immunologic: Negative for persistent infections.         Objective:            General    Constitutional: He is oriented to person, place, and time. He appears well-developed and well-nourished.   HENT:   Head: Normocephalic and atraumatic.   Eyes: EOM are normal.   Cardiovascular: Normal rate.    Pulmonary/Chest: Effort normal.   Neurological: He is alert and oriented to person, place, and time.   Psychiatric: He has a normal mood and affect. His behavior is normal.     General Musculoskeletal Exam   Gait: normal       Right Knee Exam     Inspection   Erythema: absent  Scars: absent  Swelling: absent  Effusion: effusion  Deformity: deformity (mild varus- correctable)  Bruising: absent    Tenderness   The patient is tender to palpation of the medial joint line.    Range of Motion   Extension: 0   Flexion: 120     Tests   Ligament Examination Lachman: normal (-1 to 2mm)   MCL - Valgus: normal (0 to 2mm)  LCL - Varus: normal  Patella   Passive Patellar Tilt: neutral    Other   Sensation: normal    Left Knee Exam     Inspection   Erythema: absent  Scars: absent  Swelling: absent  Effusion: absent  Deformity: deformity  Bruising: absent    Tenderness   The patient is experiencing no tenderness.         Range of Motion   Extension: 0   Flexion: 130     Tests   Stability Lachman: normal (-1 to 2mm)   MCL - Valgus: normal (0 to 2mm)  LCL - Varus: normal (0 to 2mm)  Patella   Passive Patellar Tilt: neutral    Other   Sensation: normal    Muscle Strength   Right Lower Extremity   Hip Abduction: 5/5   Quadriceps:  5/5   Hamstrin/5   Left Lower Extremity   Hip Abduction: 5/5   Quadriceps:  5/5   Hamstrin/5     Reflexes     Left Side  Quadriceps:  2+    Right Side   Quadriceps:  2+    Vascular Exam     Right Pulses  Dorsalis Pedis:      2+          Left Pulses  Dorsalis Pedis:      2+          Edema  Right Lower Leg: absent  Left Lower Leg: absent    Radiographs taken today and reviewed by me demonstrate  moderate arthritic change of the right KNEE(s).There  is not bone destruction.  There is not a fracture. The medial compartment is most involved.  There is a varus deformity.               Assessment:       Encounter Diagnosis   Name Primary?    Primary osteoarthritis of right knee Yes          Plan:       Kleber was seen today for pain and pain.    Diagnoses and all orders for this visit:    Primary osteoarthritis of right knee  -     Prior Authorization Order    Other orders  -     EUFLEXXA 10 mg/mL(mw 2.4 -3.6 million) injection Syrg 20 mg; Inject 2 mLs (20 mg total) into the articular space once a week.      Treatment options have been discussed.  We have decided to proceed with right knee Euflexxa injections.  The risk of pseudoseptic reactions were discussed.  Donald Warren Abadie will return for his first injection..

## 2017-08-11 RX ORDER — LORAZEPAM 1 MG/1
TABLET ORAL
Qty: 30 TABLET | Refills: 0 | Status: SHIPPED | OUTPATIENT
Start: 2017-08-11 | End: 2017-09-12 | Stop reason: SDUPTHER

## 2017-08-15 ENCOUNTER — OFFICE VISIT (OUTPATIENT)
Dept: ORTHOPEDICS | Facility: CLINIC | Age: 62
End: 2017-08-15
Payer: COMMERCIAL

## 2017-08-15 ENCOUNTER — TELEPHONE (OUTPATIENT)
Dept: INTERNAL MEDICINE | Facility: CLINIC | Age: 62
End: 2017-08-15

## 2017-08-15 VITALS — BODY MASS INDEX: 29.37 KG/M2 | HEIGHT: 66 IN | WEIGHT: 182.75 LBS

## 2017-08-15 DIAGNOSIS — M17.11 PRIMARY OSTEOARTHRITIS OF RIGHT KNEE: ICD-10-CM

## 2017-08-15 PROCEDURE — 99999 PR PBB SHADOW E&M-EST. PATIENT-LVL III: CPT | Mod: PBBFAC,,, | Performed by: NURSE PRACTITIONER

## 2017-08-15 PROCEDURE — 99499 UNLISTED E&M SERVICE: CPT | Mod: S$GLB,,, | Performed by: NURSE PRACTITIONER

## 2017-08-15 PROCEDURE — 20610 DRAIN/INJ JOINT/BURSA W/O US: CPT | Mod: RT,S$GLB,, | Performed by: NURSE PRACTITIONER

## 2017-08-15 RX ADMIN — Medication 20 MG: at 12:08

## 2017-08-15 NOTE — LETTER
August 15, 2017      Mikael Huerta MD  [address]  [facility] [address]           [facility] - Orthopedics  [address]  [facility] [address]  Phone: [phone]          Patient: Donald Warren Abadie   MR Number: [id number]   YOB: 1955   Date of Visit: 8/15/2017       Dear Dr. Mikael Huerta:    Thank you for referring Donald Abadie to me for evaluation. Attached you will find relevant portions of my assessment and plan of care.    If you have questions, please do not hesitate to call me. I look forward to following Donald Abadie along with you.    Sincerely,    Olivia Hardy NP    Enclosure  CC:  No Recipients    If you would like to receive this communication electronically, please contact [email or web address] or [phone] to request more information on [employer] Link access.    For providers and/or their staff who would like to refer a patient to [facility], please contact us through our one-stop-shop provider referral line, [facility], at [phone].    If you feel you have received this communication in error or would no longer like to receive these types of communications, please e-mail [email or web address]

## 2017-08-15 NOTE — TELEPHONE ENCOUNTER
----- Message from Bacilio Larry MD sent at 8/9/2017  6:22 PM CDT -----  Please schedule a follow up appointment with me. Notify that his PSA has increased.

## 2017-08-15 NOTE — PROGRESS NOTES
Donald Warren Abadie presents to clinic today for the first right knee Euflexxa injection.    Exam demonstrates the no effusion in the  right knee, and the skin is intact.    Diagnosis: osteoarthritis knee    After time out was performed and patient ID, side, and site were verified, the  right  knee was sterilly prepped in the standard fashion.  A 22-gauge needle was introduced into right knee joint from an renetta-lateral site without complication and knee was then injected with 2 ml of Euflexxa.  Sterile dressing was applied.  The patient was instructed to resume activities as tolerated and to call with any problems.     We will see Donald Warren Abadie back next week for the second injection.

## 2017-08-16 ENCOUNTER — TELEPHONE (OUTPATIENT)
Dept: INTERNAL MEDICINE | Facility: CLINIC | Age: 62
End: 2017-08-16

## 2017-08-16 ENCOUNTER — TELEPHONE (OUTPATIENT)
Dept: ORTHOPEDICS | Facility: CLINIC | Age: 62
End: 2017-08-16

## 2017-08-16 NOTE — TELEPHONE ENCOUNTER
----- Message from Marcelle Lee sent at 8/16/2017  9:28 AM CDT -----  Contact: self/[phone]  Patient called in regards needing to talk with [facility] [place] about if is possible for him to get the appointment for Tuesday 08/22/17, patient stated that he have an appointment on that date with another clinic and would like to get an appointment the same date. Please call and advise.       Thank you!!!

## 2017-08-16 NOTE — TELEPHONE ENCOUNTER
----- Message from Abdi Johnson sent at 8/16/2017  8:59 AM CDT -----  Contact: self  Pt request a call to schedule his other two euflexxa injections

## 2017-08-16 NOTE — TELEPHONE ENCOUNTER
Spoke to pt and his last two euflexxa injections were scheduled. Appt slip will be mailed. Pt verbalized understanding.

## 2017-08-22 ENCOUNTER — OFFICE VISIT (OUTPATIENT)
Dept: ORTHOPEDICS | Facility: CLINIC | Age: 62
End: 2017-08-22
Payer: COMMERCIAL

## 2017-08-22 VITALS — BODY MASS INDEX: 29.37 KG/M2 | HEIGHT: 66 IN | WEIGHT: 182.75 LBS

## 2017-08-22 DIAGNOSIS — M17.11 PRIMARY OSTEOARTHRITIS OF RIGHT KNEE: ICD-10-CM

## 2017-08-22 PROCEDURE — 20610 DRAIN/INJ JOINT/BURSA W/O US: CPT | Mod: RT,S$GLB,, | Performed by: NURSE PRACTITIONER

## 2017-08-22 PROCEDURE — 99499 UNLISTED E&M SERVICE: CPT | Mod: S$GLB,,, | Performed by: NURSE PRACTITIONER

## 2017-08-22 PROCEDURE — 99999 PR PBB SHADOW E&M-EST. PATIENT-LVL III: CPT | Mod: PBBFAC,,, | Performed by: NURSE PRACTITIONER

## 2017-08-22 RX ADMIN — Medication 20 MG: at 01:08

## 2017-08-22 NOTE — PROGRESS NOTES
Donald Warren Abadie presents to clinic today for the second right knee Euflexxa injection.    Exam demonstrates the no effusion in the  right knee, and the skin is intact.    Diagnosis: osteoarthritis knee    After time out was performed and patient ID, side, and site were verified, the  right  knee was sterilly prepped in the standard fashion.  A 22-gauge needle was introduced into right knee joint from an renetta-lateral site without complication and knee was then injected with 2 ml of Euflexxa.  Sterile dressing was applied.  The patient was instructed to resume activities as tolerated and to call with any problems.     We will see Donald Warren Abadie back next week for the third injection.

## 2017-08-22 NOTE — LETTER
August 22, 2017      Mikael Huerta MD  [address]  [facility] [address]           [facility]  [address], [address]  [facility] [address]  Phone: [phone]          Patient: Donald Warren Abadie   MR Number: [id number]   YOB: 1955   Date of Visit: 8/22/2017       Dear Dr. Mikael Huerta:    Thank you for referring Donald Abadie to me for evaluation. Attached you will find relevant portions of my assessment and plan of care.    If you have questions, please do not hesitate to call me. I look forward to following Donald Abadie along with you.    Sincerely,    Olivia Hardy NP    Enclosure  CC:  No Recipients    If you would like to receive this communication electronically, please contact [email or web address] or [phone] to request more information on [employer] Link access.    For providers and/or their staff who would like to refer a patient to [facility], please contact us through our one-stop-shop provider referral line, [facility], at [phone].    If you feel you have received this communication in error or would no longer like to receive these types of communications, please e-mail [email or web address]

## 2017-08-23 ENCOUNTER — OFFICE VISIT (OUTPATIENT)
Dept: INTERNAL MEDICINE | Facility: CLINIC | Age: 62
End: 2017-08-23
Payer: COMMERCIAL

## 2017-08-23 ENCOUNTER — PATIENT MESSAGE (OUTPATIENT)
Dept: OTOLARYNGOLOGY | Facility: CLINIC | Age: 62
End: 2017-08-23

## 2017-08-23 VITALS
SYSTOLIC BLOOD PRESSURE: 120 MMHG | HEIGHT: 66 IN | BODY MASS INDEX: 29.48 KG/M2 | WEIGHT: 183.44 LBS | DIASTOLIC BLOOD PRESSURE: 74 MMHG | HEART RATE: 65 BPM

## 2017-08-23 DIAGNOSIS — E11.9 DIABETES MELLITUS TYPE 2, NONINSULIN DEPENDENT: ICD-10-CM

## 2017-08-23 DIAGNOSIS — Z01.00 DIABETIC EYE EXAM: ICD-10-CM

## 2017-08-23 DIAGNOSIS — R97.20 ELEVATED PSA: Primary | ICD-10-CM

## 2017-08-23 DIAGNOSIS — H91.93 DECREASED HEARING, BILATERAL: ICD-10-CM

## 2017-08-23 DIAGNOSIS — E11.9 DIABETIC EYE EXAM: ICD-10-CM

## 2017-08-23 LAB
BILIRUB UR QL STRIP: NEGATIVE
CLARITY UR REFRACT.AUTO: CLEAR
COLOR UR AUTO: NORMAL
CREAT UR-MCNC: 28 MG/DL
GLUCOSE UR QL STRIP: NEGATIVE
HGB UR QL STRIP: NEGATIVE
KETONES UR QL STRIP: NEGATIVE
LEUKOCYTE ESTERASE UR QL STRIP: NEGATIVE
MICROALBUMIN UR DL<=1MG/L-MCNC: <2.5 UG/ML
MICROALBUMIN/CREATININE RATIO: NORMAL UG/MG
NITRITE UR QL STRIP: NEGATIVE
PH UR STRIP: 6 [PH] (ref 5–8)
PROT UR QL STRIP: NEGATIVE
SP GR UR STRIP: 1 (ref 1–1.03)
URN SPEC COLLECT METH UR: NORMAL
UROBILINOGEN UR STRIP-ACNC: NEGATIVE EU/DL

## 2017-08-23 PROCEDURE — 87086 URINE CULTURE/COLONY COUNT: CPT

## 2017-08-23 PROCEDURE — 81003 URINALYSIS AUTO W/O SCOPE: CPT

## 2017-08-23 PROCEDURE — 3044F HG A1C LEVEL LT 7.0%: CPT | Mod: S$GLB,,, | Performed by: INTERNAL MEDICINE

## 2017-08-23 PROCEDURE — 3074F SYST BP LT 130 MM HG: CPT | Mod: S$GLB,,, | Performed by: INTERNAL MEDICINE

## 2017-08-23 PROCEDURE — 3078F DIAST BP <80 MM HG: CPT | Mod: S$GLB,,, | Performed by: INTERNAL MEDICINE

## 2017-08-23 PROCEDURE — 82570 ASSAY OF URINE CREATININE: CPT

## 2017-08-23 PROCEDURE — 99999 PR PBB SHADOW E&M-EST. PATIENT-LVL V: CPT | Mod: PBBFAC,,, | Performed by: INTERNAL MEDICINE

## 2017-08-23 PROCEDURE — 99213 OFFICE O/P EST LOW 20 MIN: CPT | Mod: S$GLB,,, | Performed by: INTERNAL MEDICINE

## 2017-08-23 PROCEDURE — 3008F BODY MASS INDEX DOCD: CPT | Mod: S$GLB,,, | Performed by: INTERNAL MEDICINE

## 2017-08-23 NOTE — PROGRESS NOTES
CHIEF COMPLAINT:  Elevated PSA.    HISTORY OF PRESENT ILLNESS:  The patient is a 61-year-old gentleman who we see   for non-insulin-dependent diabetes as well as fatty liver, who comes in today   for followup of elevated PSA.  The patient had blood tests done, which showed   that his PSA had increased to 3.8.  Normally, he is in the 1.5-2 range.  He has   no urinary symptoms.  He does have nocturia where he gets up two to three times   at night, but this is not new.  His stream is good.    REVIEW OF SYSTEMS:  No fever or chills.  The patient does complain of some right   knee pain status post injection of Euflexxa.  The patient does state he checks   his blood sugars every day, usually in the morning, they have been around 150.    His A1c was 6.1 on the 9th of this month.  He did have his cholesterol done as   well.  His total cholesterol was 185.  His LDL was 84, and triglycerides were   380.  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    PHYSICAL EXAMINATION:  GENERAL APPEARANCE:  No acute distress.  HEENT:  Trachea was midline without JVD.  PULMONARY:  Good inspiratory, expiratory breath sounds are heard.  Lungs are   clear to auscultation.  CARDIOVASCULAR:  S1, S2.  EXTREMITIES:  With trace edema.  ABDOMEN:  Nontender, nondistended, without hepatosplenomegaly.  RECTAL:  A digital rectal exam was performed.  The rectum was normal.  Stool was   brown and heme negative.  Scrotum and penis were normal.    ASSESSMENT:  1.  Elevated PSA.  2.  The patient needs referral to ENT for hearing evaluation as well as to   Ophthalmology for diabetic eye exam.    PLAN:  We will put the patient in for a UA, urine culture, and urine for   microalbumin.  We will also refer the patient to Ophthalmology and ENT.      ORLANDO/DEJON  dd: 08/23/2017 11:34:31 (CDT)  td: 08/23/2017 23:54:17 (CDT)  Doc ID   #[id number]  Job ID  #[id number]    CC:

## 2017-08-24 LAB — BACTERIA UR CULT: NORMAL

## 2017-08-29 ENCOUNTER — OFFICE VISIT (OUTPATIENT)
Dept: ORTHOPEDICS | Facility: CLINIC | Age: 62
End: 2017-08-29
Payer: COMMERCIAL

## 2017-08-29 VITALS — WEIGHT: 183.44 LBS | BODY MASS INDEX: 29.48 KG/M2 | HEIGHT: 66 IN

## 2017-08-29 DIAGNOSIS — M17.11 PRIMARY OSTEOARTHRITIS OF RIGHT KNEE: ICD-10-CM

## 2017-08-29 PROCEDURE — 99999 PR PBB SHADOW E&M-EST. PATIENT-LVL III: CPT | Mod: PBBFAC,,, | Performed by: NURSE PRACTITIONER

## 2017-08-29 PROCEDURE — 20610 DRAIN/INJ JOINT/BURSA W/O US: CPT | Mod: RT,S$GLB,, | Performed by: NURSE PRACTITIONER

## 2017-08-29 PROCEDURE — 99499 UNLISTED E&M SERVICE: CPT | Mod: S$GLB,,, | Performed by: NURSE PRACTITIONER

## 2017-08-29 RX ADMIN — Medication 20 MG: at 11:08

## 2017-08-29 NOTE — LETTER
August 29, 2017      Mikael Huerta MD  [address]  [place] [address]           [facility]  [address]  [place] [address]  Phone: [phone]          Patient: Donald Warren Abadie   MR Number: [id number]   YOB: 1955   Date of Visit: 8/29/2017       Dear Dr. Mikael Huerta:    Thank you for referring Donald Abadie to me for evaluation. Attached you will find relevant portions of my assessment and plan of care.    If you have questions, please do not hesitate to call me. I look forward to following Donald Abadie along with you.    Sincerely,    Olivia Hardy NP    Enclosure  CC:  No Recipients    If you would like to receive this communication electronically, please contact [email or web address] or [phone] to request more information on [employer] Link access.    For providers and/or their staff who would like to refer a patient to [facility], please contact us through our one-stop-shop provider referral line, [facility], at [phone].    If you feel you have received this communication in error or would no longer like to receive these types of communications, please e-mail [email or web address]

## 2017-08-29 NOTE — PROGRESS NOTES
Donald Warren Abadie presents to clinic today for the third right knee Euflexxa injection.    Exam demonstrates the no effusion in the  right knee, and the skin is intact.    Diagnosis: osteoarthritis knee    After time out was performed and patient ID, side, and site were verified, the  right  knee was sterilly prepped in the standard fashion.  A 22-gauge needle was introduced into right knee joint from an renetta-lateral site without complication and knee was then injected with 2 ml of Euflexxa.  Sterile dressing was applied.  The patient was instructed to resume activities as tolerated and to call with any problems.     He is having good relief in the right knee. He will f/u as needed.

## 2017-09-01 ENCOUNTER — PATIENT MESSAGE (OUTPATIENT)
Dept: INTERNAL MEDICINE | Facility: CLINIC | Age: 62
End: 2017-09-01

## 2017-09-01 ENCOUNTER — TELEPHONE (OUTPATIENT)
Dept: INTERNAL MEDICINE | Facility: CLINIC | Age: 62
End: 2017-09-01

## 2017-09-01 DIAGNOSIS — R97.20 ELEVATED PSA: Primary | ICD-10-CM

## 2017-09-01 NOTE — TELEPHONE ENCOUNTER
----- Message from Bacilio Larry MD sent at 9/1/2017  7:35 AM CDT -----  Please contact patient. Notify that his UA and culture were normal. All I would like to do is repeat a PSA in 3 months. Orders are in.

## 2017-09-05 NOTE — TELEPHONE ENCOUNTER
Spoke with pt message given. Pt verbalized understanding. Will give office a call back after he speaks with his daughter.

## 2017-09-05 NOTE — TELEPHONE ENCOUNTER
Please call pt to advise that we do not have open MRI that is available to do MR elastography. He will either need to do liver biopsy or we can just continue to monitor for now with Fibroscan since he thinks he will be unable to tolerate MRI with sedation to do MR elastography.    I have communicated all this to him in previous my [facility] messages. Pt never responded when I asked if he wanted to proceed with liver biopsy.     Please schedule f/u visit for pt with me in 11/2017 if he does not want to proceed with liver biopsy.

## 2017-09-06 RX ORDER — FENOFIBRATE 160 MG/1
TABLET ORAL
Qty: 90 TABLET | Refills: 1 | Status: SHIPPED | OUTPATIENT
Start: 2017-09-06 | End: 2018-03-03 | Stop reason: SDUPTHER

## 2017-09-12 RX ORDER — LORAZEPAM 1 MG/1
TABLET ORAL
Qty: 30 TABLET | Refills: 0 | Status: SHIPPED | OUTPATIENT
Start: 2017-09-12 | End: 2017-10-11 | Stop reason: SDUPTHER

## 2017-10-11 RX ORDER — LORAZEPAM 1 MG/1
TABLET ORAL
Qty: 30 TABLET | Refills: 0 | Status: SHIPPED | OUTPATIENT
Start: 2017-10-11 | End: 2017-11-10 | Stop reason: SDUPTHER

## 2017-10-13 ENCOUNTER — TELEPHONE (OUTPATIENT)
Dept: RHEUMATOLOGY | Facility: CLINIC | Age: 62
End: 2017-10-13

## 2017-10-13 ENCOUNTER — LAB VISIT (OUTPATIENT)
Dept: LAB | Facility: HOSPITAL | Age: 62
End: 2017-10-13
Attending: INTERNAL MEDICINE
Payer: COMMERCIAL

## 2017-10-13 ENCOUNTER — OFFICE VISIT (OUTPATIENT)
Dept: RHEUMATOLOGY | Facility: CLINIC | Age: 62
End: 2017-10-13
Payer: COMMERCIAL

## 2017-10-13 VITALS
SYSTOLIC BLOOD PRESSURE: 116 MMHG | DIASTOLIC BLOOD PRESSURE: 76 MMHG | BODY MASS INDEX: 29.7 KG/M2 | HEART RATE: 64 BPM | HEIGHT: 66 IN | WEIGHT: 184.81 LBS

## 2017-10-13 DIAGNOSIS — M1A.09X0 IDIOPATHIC CHRONIC GOUT OF MULTIPLE SITES WITHOUT TOPHUS: Primary | ICD-10-CM

## 2017-10-13 DIAGNOSIS — M17.11 PRIMARY OSTEOARTHRITIS OF RIGHT KNEE: ICD-10-CM

## 2017-10-13 DIAGNOSIS — M1A.09X0 IDIOPATHIC CHRONIC GOUT OF MULTIPLE SITES WITHOUT TOPHUS: ICD-10-CM

## 2017-10-13 LAB — URATE SERPL-MCNC: 6.7 MG/DL

## 2017-10-13 PROCEDURE — 99999 PR PBB SHADOW E&M-EST. PATIENT-LVL III: CPT | Mod: PBBFAC,,, | Performed by: INTERNAL MEDICINE

## 2017-10-13 PROCEDURE — 84550 ASSAY OF BLOOD/URIC ACID: CPT

## 2017-10-13 PROCEDURE — 36415 COLL VENOUS BLD VENIPUNCTURE: CPT

## 2017-10-13 PROCEDURE — 99213 OFFICE O/P EST LOW 20 MIN: CPT | Mod: S$GLB,,, | Performed by: INTERNAL MEDICINE

## 2017-10-13 ASSESSMENT — ROUTINE ASSESSMENT OF PATIENT INDEX DATA (RAPID3)
PAIN SCORE: 5
PATIENT GLOBAL ASSESSMENT SCORE: 5
PSYCHOLOGICAL DISTRESS SCORE: 0
FATIGUE SCORE: 0
AM STIFFNESS SCORE: 1, YES
MDHAQ FUNCTION SCORE: 0
TOTAL RAPID3 SCORE: 3.33

## 2017-10-16 NOTE — PROGRESS NOTES
History of present illness: 62-year-old gentleman I am following for gout.  He also has chronic problem in his right knee due to osteoarthritis and bursitis.  He has had no recent gout attacks.  He has been taking allopurinol 100 mg daily.  He is supposed to be on 200 mg daily.  He has no history of tophi.    He has had several injections in the knee.  They only give him temporary relief.  He has been taking diclofenac once daily with some relief.  He tolerates the diclofenac.  He has no problem with the left knee.  He has no other peripheral joint complaints.    Physical examination:  Musculoskeletal: He has bony hypertrophy of the right knee.  There is no erythema or increased warmth.  It is tender to palpation and he has pain on range of motion.  He does have some mild bony hypertrophy of the left knee but is not tender.  Ankles and feet are unremarkable.    Assessment:  1.  Intercritical gout  2.  Osteoarthritis of the knee    Plans:  1.  Check laboratory studies today  2.  Adjust allopurinol dosage if necessary  3.  Continue diclofenac as needed  4.  Return to see me in 12 months

## 2017-11-06 ENCOUNTER — PATIENT MESSAGE (OUTPATIENT)
Dept: ORTHOPEDICS | Facility: CLINIC | Age: 62
End: 2017-11-06

## 2017-11-07 ENCOUNTER — PATIENT MESSAGE (OUTPATIENT)
Dept: SPORTS MEDICINE | Facility: CLINIC | Age: 62
End: 2017-11-07

## 2017-11-10 RX ORDER — LORAZEPAM 1 MG/1
TABLET ORAL
Qty: 30 TABLET | Refills: 0 | Status: SHIPPED | OUTPATIENT
Start: 2017-11-10 | End: 2017-12-08 | Stop reason: SDUPTHER

## 2017-11-15 ENCOUNTER — OFFICE VISIT (OUTPATIENT)
Dept: SPORTS MEDICINE | Facility: CLINIC | Age: 62
End: 2017-11-15
Payer: COMMERCIAL

## 2017-11-15 VITALS — BODY MASS INDEX: 29.57 KG/M2 | TEMPERATURE: 99 F | HEIGHT: 66 IN | WEIGHT: 184 LBS

## 2017-11-15 DIAGNOSIS — R53.81 PHYSICAL DECONDITIONING: ICD-10-CM

## 2017-11-15 DIAGNOSIS — M17.11 PRIMARY OSTEOARTHRITIS OF RIGHT KNEE: Primary | ICD-10-CM

## 2017-11-15 PROCEDURE — 99999 PR PBB SHADOW E&M-EST. PATIENT-LVL III: CPT | Mod: PBBFAC,,, | Performed by: FAMILY MEDICINE

## 2017-11-15 PROCEDURE — 99243 OFF/OP CNSLTJ NEW/EST LOW 30: CPT | Mod: S$GLB,,, | Performed by: FAMILY MEDICINE

## 2017-11-15 NOTE — LETTER
November 15, 2017      Olivia Hardy, ZULEMA  [address]  [facility] [address]           [facility]  [address]  [facility] [address]  Phone: [phone]          Patient: Donald Warren Abadie   MR Number: [id number]   YOB: 1955   Date of Visit: 11/15/2017       Dear Olivia Hardy:    Thank you for referring Donald Abadie to me for evaluation. Attached you will find relevant portions of my assessment and plan of care.    If you have questions, please do not hesitate to call me. I look forward to following Donald Abadie along with you.    Sincerely,    Cuauhtemoc Thornton MD    Enclosure  CC:  No Recipients    If you would like to receive this communication electronically, please contact [email or web address] or [phone] to request more information on [employer] access.    For providers and/or their staff who would like to refer a patient to [facility], please contact us through our one-stop-shop provider referral line, [facility] , at [phone].    If you feel you have received this communication in error or would no longer like to receive these types of communications, please e-mail [email or web address]

## 2017-11-15 NOTE — PROGRESS NOTES
Donald Warren Abadie, a 62 y.o. male, presents today for evaluation of his RIGHT knee.      This patient visit is a consult from the following provider: Olivia Hardy  Today's office visit notes will be made available to the consulting/refering provider via the mail, a fax, and/or an in basket message through the electronic medical record    New patient.    HISTORY OF PRESENT ILLNESS   Location: anterior knee, right  Onset: insidious, chronic  Palliative:    Relative rest   Oral analgesics   VSI, 17.08, no improvement   Provocative:    ADLs  Prior:    PMH gout  Progression: worsening discomfort  Quality:    Sharp at times   Ache  Radiation: none  Severity: per nursing documentation  Timing: intermittent w/ use  Trauma: none    Review of systems (ROS):  A 10+ review of systems was performed with pertinent positives and negatives noted above in the history of present illness. Other systems were negative unless otherwise specified.    PHYSICAL EXAMINATION  General:  The patient is alert and oriented x 3. Mood is pleasant. Observation of ears, eyes and nose reveal no gross abnormalities. HEENT: NCAT, sclera anicteric.   Lungs: Respirations are equal and unlabored.  Gait is coordinated. Patient can toe walk and heel walk without difficulty.    RIGHT KNEE EXAMINATION    Observation/Inspection  Gait:   Antalgic   Alignment:  Neutral   Scars:   None   Muscle atrophy: Mild  Effusion:  None   Warmth:  None   Discoloration:   none     Tenderness / Crepitus (T / C):         T / C      T / C  Patella   - / -   Lateral joint line   - / -     Peripatellar medial  -  Medial joint line    + / -  Peripatellar lateral -  Medial plica   - / -  Patellar tendon -   Popliteal fossa   - / -  Quad tendon   -   Gastrocnemius   -  Prepatellar Bursa - / -   Quadricep   -  Tibial tubercle  -  Thigh/hamstring  -  Pes anserine/HS -  Fibula    -  ITB   - / -  Tibia     -  Tib/fib joint  - / -  LCL    -    MFC   - / -    MCL: Proximal  -    LFC   - / -   Distal    -          ROM: (* = pain)  PASSIVE   ACTIVE    Left :   5 / 0 / 145   5 / 0 / 145     Right :    5 / 0 / 145*   5 / 0 / 145*    Patellofemoral examination:  See above noted areas of tenderness.   Patella position    Subluxation / dislocation: Centered        Sup. / Inf;   Normal   Crepitus (PF):    Absent   Patellar Mobility:       Medial-lateral:   Normal    Superior-inferior:  Normal    Inferior tilt   Normal    Patellar tendon:  Normal   Lateral tilt:    Normal   J-sign:     None   Patellofemoral grind:   No pain     Meniscal Signs:     Pain on terminal extension:  +*  Pain on terminal flexion:  +*  Minnies maneuver:  +*  Squat     NT    Ligament Examination:  ACL / Lachman:  WNL  PCL-Post.  drawer: normal 0 to 2mm  MCL- Valgus:  normal 0 to 2mm  LCL- Varus:    normal 0 to 2mm  Pivot shift:  guarding   Dial Test:   difference c/w other side   At 30° flexion: normal (< 5°)    At 90° flexion: normal (< 5°)   Reverse Pivot Shift:   normal (Equal)     Strength: (* = with pain) Painful Side  Quadriceps   5/5  Hamstrin/5    Extremity Neuro-vascular Examination:   Sensation:  Grossly intact to light touch all dermatomal regions.   Motor Function:  Fully intact motor function at hip, knee, foot and ankle    DTRs;  quadriceps and  achilles 2+.  No clonus and downgoing Babinski.    Vascular status:  DP and PT pulses 2+, brisk capillary refill, symmetric.     Other Findings:    ASSESSMENT & PLAN  Assessment:   #1 Kellgren-Regis Grade III osteoarthritis of knee, sal medial compartment, right    No evidence of neurologic pathology  No evidence of vascular pathology    Imaging studies reviewed:   X-ray knee, bilateral 17.08    Plan:    We discussed the importance of appropriate diet, weight, and regular exercise including quadriceps strengthening     We discussed options including:  #1 watchful waiting  #2 physical therapy aimed at:   Core stability   RoM  knee   Strengthening quadriceps   Gait training   #3 injection therapy:   CSI iaknee     Right,     Left,    VSI iaknee    Right,     Left,    Orthobiologics     PRPi iaknee     right    SCi iaknee     right  #4 consultation re: TKA     The patient chooses #3 prpi iaknee right    Pain management: handout given  Bracing:   Physical therapy:   Activity (e.g. sports, work) restrictions: as tolerated   School/vocation: retired      Follow up for PRPi iaknee right  Should symptoms worsen or fail to resolve, consider:  Revisiting the above options

## 2017-11-16 ENCOUNTER — OFFICE VISIT (OUTPATIENT)
Dept: SPORTS MEDICINE | Facility: CLINIC | Age: 62
End: 2017-11-16

## 2017-11-16 VITALS — BODY MASS INDEX: 29.57 KG/M2 | TEMPERATURE: 98 F | HEIGHT: 66 IN | WEIGHT: 184 LBS

## 2017-11-16 DIAGNOSIS — M17.11 PRIMARY OSTEOARTHRITIS OF RIGHT KNEE: Primary | ICD-10-CM

## 2017-11-16 PROCEDURE — 0232T NJX PLATELET PLASMA: CPT | Mod: S$GLB,,, | Performed by: FAMILY MEDICINE

## 2017-11-16 PROCEDURE — 99999 PR PBB SHADOW E&M-EST. PATIENT-LVL III: CPT | Mod: PBBFAC,,, | Performed by: FAMILY MEDICINE

## 2017-11-16 PROCEDURE — 99499 UNLISTED E&M SERVICE: CPT | Mod: CSM,S$GLB,, | Performed by: FAMILY MEDICINE

## 2017-11-16 NOTE — PROGRESS NOTES
Patient Indications  Donald Warren Abadie, a 62 y.o. male, presents with RIGHT knee pain .     Procedure performed  Platelet rich plasma (PRP) injection using ultrasound guidance for exact placement of orthobiologic at pathologic site     Description of procedure  Sterile technique was used to phlebotomize 123mL of blood from a peripheral vein. This blood was  into density-divided layers using an [employer] centrifuge. The layer of PRP, a volume of 4mL, was placed into a sterile syringe in preparation for the procedure.    Using 1) physical examination and 2) musculoskeletal ultrasound, the anatomy was visualized and the diseased tissue was identified and confirmed. The procedure site was marked with a skin marker. The patient was placed in position maximizing 1) physician access to pathologic tissue and 2) patient comfort. The skin about the area was sterilized with ChloraPrep (2%w/v CHG, 70% IPA). A surgical time out was performed, including verification of patient ID, procedure, site and side, availability of information and equipment, review of safety issues, and agreement with consent. The site of needle insertion was anesthetized using approximately 4cc of injected lidocaine via a 22 gauge needle. Under ultrasound guidance, the needle was advanced to the site of tissue pathology, and the injectate was deposited under direct visualization.     Complications: none     Estimated blood loss from injection procedure: none     Joint aspirate volume: n/a      Disposition  The patient tolerated the procedure well and there were no immediate complications. The patient was instructed to call the clinic immediately for any mild to moderate adverse side effects, or to call 911 in the event of an emergency.        Description of ultrasound utilization for needle / probe guidance  Ultrasound guidance was used for needle / probe localization. Images (and videos, if appropriate) were saved and stored for  documentation. Dynamic visualization of the needle / probe was continuous throughout the procedure.    0232T    Template reviewed 17.09.21

## 2017-11-28 ENCOUNTER — OFFICE VISIT (OUTPATIENT)
Dept: SPORTS MEDICINE | Facility: CLINIC | Age: 62
End: 2017-11-28

## 2017-11-28 VITALS — HEIGHT: 66 IN | TEMPERATURE: 98 F | BODY MASS INDEX: 29.57 KG/M2 | WEIGHT: 184 LBS

## 2017-11-28 DIAGNOSIS — M17.11 PRIMARY OSTEOARTHRITIS OF RIGHT KNEE: Primary | ICD-10-CM

## 2017-11-28 PROCEDURE — 99999 PR PBB SHADOW E&M-EST. PATIENT-LVL III: CPT | Mod: PBBFAC,,, | Performed by: FAMILY MEDICINE

## 2017-11-28 PROCEDURE — 99499 UNLISTED E&M SERVICE: CPT | Mod: CSM,S$GLB,, | Performed by: FAMILY MEDICINE

## 2017-11-28 PROCEDURE — 0232T NJX PLATELET PLASMA: CPT | Mod: CSM,S$GLB,, | Performed by: FAMILY MEDICINE

## 2017-11-28 NOTE — PROGRESS NOTES
Patient Indications  Donald Warren Abadie, a 62 y.o. male, presents with RIGHT knee pain .     Procedure performed  Platelet rich plasma (PRP) injection using ultrasound guidance for exact placement of orthobiologic at pathologic site     Description of procedure  Sterile technique was used to phlebotomize 123mL of blood from a peripheral vein. This blood was  into density-divided layers using an [employer] centrifuge. The layer of PRP, a volume of 4mL, was placed into a sterile syringe in preparation for the procedure.    Using 1) physical examination and 2) musculoskeletal ultrasound, the anatomy was visualized and the diseased tissue was identified and confirmed. The procedure site was marked with a skin marker. The patient was placed in position maximizing 1) physician access to pathologic tissue and 2) patient comfort. The skin about the area was sterilized with ChloraPrep (2%w/v CHG, 70% IPA). A surgical time out was performed, including verification of patient ID, procedure, site and side, availability of information and equipment, review of safety issues, and agreement with consent. The site of needle insertion was anesthetized using approximately 4cc of injected lidocaine via a 22 gauge needle. Under ultrasound guidance, the needle was advanced to the site of tissue pathology, and the injectate was deposited under direct visualization.     Complications: none     Estimated blood loss from injection procedure: none     Joint aspirate volume: n/a      Disposition  The patient tolerated the procedure well and there were no immediate complications. The patient was instructed to call the clinic immediately for any mild to moderate adverse side effects, or to call 911 in the event of an emergency.        Description of ultrasound utilization for needle / probe guidance  Ultrasound guidance was used for needle / probe localization. Images (and videos, if appropriate) were saved and stored for  documentation. Dynamic visualization of the needle / probe was continuous throughout the procedure.    0232T    Template reviewed 17.09.21

## 2017-12-01 ENCOUNTER — LAB VISIT (OUTPATIENT)
Dept: LAB | Facility: HOSPITAL | Age: 62
End: 2017-12-01
Attending: INTERNAL MEDICINE
Payer: COMMERCIAL

## 2017-12-01 DIAGNOSIS — M1A.09X0 IDIOPATHIC CHRONIC GOUT OF MULTIPLE SITES WITHOUT TOPHUS: ICD-10-CM

## 2017-12-01 DIAGNOSIS — R97.20 ELEVATED PSA: ICD-10-CM

## 2017-12-01 LAB
COMPLEXED PSA SERPL-MCNC: 1.6 NG/ML
URATE SERPL-MCNC: 5.7 MG/DL

## 2017-12-01 PROCEDURE — 36415 COLL VENOUS BLD VENIPUNCTURE: CPT

## 2017-12-01 PROCEDURE — 84550 ASSAY OF BLOOD/URIC ACID: CPT

## 2017-12-01 PROCEDURE — 84153 ASSAY OF PSA TOTAL: CPT

## 2017-12-03 ENCOUNTER — PATIENT MESSAGE (OUTPATIENT)
Dept: INTERNAL MEDICINE | Facility: CLINIC | Age: 62
End: 2017-12-03

## 2017-12-06 ENCOUNTER — OFFICE VISIT (OUTPATIENT)
Dept: SPORTS MEDICINE | Facility: CLINIC | Age: 62
End: 2017-12-06

## 2017-12-06 VITALS — TEMPERATURE: 98 F | WEIGHT: 184 LBS | BODY MASS INDEX: 29.57 KG/M2 | HEIGHT: 66 IN

## 2017-12-06 DIAGNOSIS — M17.11 PRIMARY OSTEOARTHRITIS OF RIGHT KNEE: Primary | ICD-10-CM

## 2017-12-06 PROCEDURE — 0232T NJX PLATELET PLASMA: CPT | Mod: CSM,S$GLB,, | Performed by: FAMILY MEDICINE

## 2017-12-06 PROCEDURE — 99499 UNLISTED E&M SERVICE: CPT | Mod: CSM,S$GLB,, | Performed by: FAMILY MEDICINE

## 2017-12-06 PROCEDURE — 99999 PR PBB SHADOW E&M-EST. PATIENT-LVL III: CPT | Mod: PBBFAC,,, | Performed by: FAMILY MEDICINE

## 2017-12-06 NOTE — PROGRESS NOTES
Patient Indications  Donald Warren Abadie, a 62 y.o. male, presents with RIGHT knee pain .     Procedure performed  Platelet rich plasma (PRP) injection using ultrasound guidance for exact placement of orthobiologic at pathologic site     Description of procedure  Sterile technique was used to phlebotomize 104mL of blood from a peripheral vein. This blood was  into density-divided layers using an [employer] centrifuge. The layer of PRP, a volume of 4mL, was placed into a sterile syringe in preparation for the procedure.    Using 1) physical examination and 2) musculoskeletal ultrasound, the anatomy was visualized and the diseased tissue was identified and confirmed. The procedure site was marked with a skin marker. The patient was placed in position maximizing 1) physician access to pathologic tissue and 2) patient comfort. The skin about the area was sterilized with ChloraPrep (2%w/v CHG, 70% IPA). A surgical time out was performed, including verification of patient ID, procedure, site and side, availability of information and equipment, review of safety issues, and agreement with consent. Under ultrasound guidance, the needle was advanced to the site of tissue pathology, and the injectate was deposited under direct visualization.     Complications: none     Estimated blood loss from injection procedure: none     Joint aspirate volume: 0 mL     Disposition  The patient tolerated the procedure well and there were no immediate complications. The patient was instructed to call the clinic immediately for any mild to moderate adverse side effects, or to call 911 in the event of an emergency.        Description of ultrasound utilization for needle / probe guidance  Ultrasound guidance was used for needle / probe localization. Images (and videos, if appropriate) were saved and stored for documentation. Dynamic visualization of the needle / probe was continuous throughout the procedure.    Template reviewed  17.09.21

## 2017-12-09 RX ORDER — LORAZEPAM 1 MG/1
TABLET ORAL
Qty: 30 TABLET | Refills: 0 | Status: SHIPPED | OUTPATIENT
Start: 2017-12-09 | End: 2018-01-08 | Stop reason: SDUPTHER

## 2017-12-26 ENCOUNTER — TELEPHONE (OUTPATIENT)
Dept: INTERNAL MEDICINE | Facility: CLINIC | Age: 62
End: 2017-12-26

## 2017-12-26 ENCOUNTER — PATIENT MESSAGE (OUTPATIENT)
Dept: ELECTROPHYSIOLOGY | Facility: CLINIC | Age: 62
End: 2017-12-26

## 2017-12-26 DIAGNOSIS — I48.91 ATRIAL FIBRILLATION, UNSPECIFIED TYPE: Primary | ICD-10-CM

## 2017-12-26 NOTE — TELEPHONE ENCOUNTER
----- Message from Jo Ann Ribera sent at 12/26/2017 11:44 AM CST -----  Contact: Pt daughter Chani [phone]  Patient would like to get a referral.  Does the patient already have the specialty clinic appointment scheduled:  no  If yes, what date is the appointment scheduled:     Referral to what specialty:  cardiologist  Reason (be specific):  Heart racing when standing  Does the patient want the referral with a specific physician:  no  Is this an [facility] or [facility]:    Comments:

## 2017-12-27 ENCOUNTER — CLINICAL SUPPORT (OUTPATIENT)
Dept: CARDIOLOGY | Facility: CLINIC | Age: 62
End: 2017-12-27
Attending: INTERNAL MEDICINE
Payer: COMMERCIAL

## 2017-12-27 ENCOUNTER — OFFICE VISIT (OUTPATIENT)
Dept: CARDIOLOGY | Facility: CLINIC | Age: 62
End: 2017-12-27
Payer: COMMERCIAL

## 2017-12-27 VITALS
HEART RATE: 72 BPM | HEIGHT: 66 IN | BODY MASS INDEX: 28.98 KG/M2 | SYSTOLIC BLOOD PRESSURE: 124 MMHG | DIASTOLIC BLOOD PRESSURE: 80 MMHG | WEIGHT: 180.31 LBS

## 2017-12-27 DIAGNOSIS — I48.0 PAROXYSMAL ATRIAL FIBRILLATION: ICD-10-CM

## 2017-12-27 DIAGNOSIS — I48.91 ATRIAL FIBRILLATION WITH RVR: Primary | ICD-10-CM

## 2017-12-27 LAB
AORTIC VALVE REGURGITATION: NORMAL
DIASTOLIC DYSFUNCTION: NO
ESTIMATED PA SYSTOLIC PRESSURE: 19.32
MITRAL VALVE REGURGITATION: NORMAL
RETIRED EF AND QEF - SEE NOTES: 55 (ref 55–65)
TRICUSPID VALVE REGURGITATION: NORMAL

## 2017-12-27 PROCEDURE — 93306 TTE W/DOPPLER COMPLETE: CPT | Mod: S$GLB,,, | Performed by: INTERNAL MEDICINE

## 2017-12-27 PROCEDURE — 93224 XTRNL ECG REC UP TO 48 HRS: CPT | Mod: S$GLB,,, | Performed by: INTERNAL MEDICINE

## 2017-12-27 PROCEDURE — 93000 ELECTROCARDIOGRAM COMPLETE: CPT | Mod: S$GLB,,, | Performed by: INTERNAL MEDICINE

## 2017-12-27 PROCEDURE — 99214 OFFICE O/P EST MOD 30 MIN: CPT | Mod: S$GLB,,, | Performed by: INTERNAL MEDICINE

## 2017-12-27 PROCEDURE — 99999 PR PBB SHADOW E&M-EST. PATIENT-LVL V: CPT | Mod: PBBFAC,,, | Performed by: INTERNAL MEDICINE

## 2017-12-27 RX ORDER — METOPROLOL TARTRATE 25 MG/1
25 TABLET, FILM COATED ORAL 2 TIMES DAILY
Qty: 60 TABLET | Refills: 11 | Status: SHIPPED | OUTPATIENT
Start: 2017-12-27 | End: 2017-12-27 | Stop reason: SDUPTHER

## 2017-12-27 RX ORDER — METOPROLOL TARTRATE 25 MG/1
25 TABLET, FILM COATED ORAL 2 TIMES DAILY
Qty: 60 TABLET | Refills: 11 | Status: SHIPPED | OUTPATIENT
Start: 2017-12-27 | End: 2018-01-19 | Stop reason: SDUPTHER

## 2017-12-27 RX ORDER — OMEPRAZOLE 20 MG/1
20 TABLET, DELAYED RELEASE ORAL DAILY
COMMUNITY
End: 2018-08-06

## 2017-12-27 NOTE — PROGRESS NOTES
Cardiology Clinic Note  Reason for Visit: Atrial fibrillation  Referring: Dr. Larry    HPI:   Mr. Abadie is a 62-yo man with RCC, pre-diabetes, paroxysmal AF documented since at least 2014 (seen on ECGs 5/22/14, 7/1/14), who presents for evaluation.  He last saw Dr. Giang in August 2016, on rate control therapy given his infrequency of symptoms at that time, however was only able to tolerate 25mg metoprolol once daily due to fatigue as well as PRN.  He feels that his atrial fibrillation has been more frequent as of late, currently feels like he is in atrial fibrillation.  He is getting these episodes at least once per week, lasting now for 8-10 hours or more.  He usually feels palpitations and fatigued as well as REY with his atrial fibrillation.  He will break into a cold sweat as this resolves.  When he is in regular rhythm, he is usually active and able to complete activities without difficulty.      He has never had a sleep study, says that he is claustrophobic.  He does drink beer regularly, minimum 6-8 beers daily.     ROS:    Constitution: Negative for fever or chills. Negative for weight loss or gain.   HENT: Negative for sore throat or headaches. Negative for rhinorrhea.  Eyes: Negative for blurred or double vision.   Cardiovascular: See above  Pulmonary: Positive for SOB. Negative for cough.   Gastrointestinal: Negative for abdominal pain. Negative for nausea/ vomiting.   : Negative for dysuria.   Neurological: Negative for focal weakness or sensory changes.  PMH:     Past Medical History:   Diagnosis Date    A-fib     Alcohol abuse     Arthritis     Chronic gout     Diabetes mellitus     DM (diabetes mellitus) 11/16/2016    Fatty liver     Hyperlipidemia     Renal cell cancer 2014     Past Surgical History:   Procedure Laterality Date    ABSCESS DRAINAGE      perirectal    COLONOSCOPY      HAND SURGERY Left     PARTIAL NEPHRECTOMY Left August 2014     Allergies:     Review of patient's  "allergies indicates:   Allergen Reactions    No known drug allergies      Medications:     Current Outpatient Prescriptions on File Prior to Visit   Medication Sig Dispense Refill    allopurinol (ZYLOPRIM) 100 MG tablet TAKE 2 TABLETS BY MOUTH EVERY DAY 60 tablet 12    aspirin 81 MG Chew Take 1 tablet (81 mg total) by mouth once daily. 30 tablet 11    diclofenac (VOLTAREN) 75 MG EC tablet TAKE 1 TABLET BY MOUTH TWO TIMES A DAY 60 tablet 12    fenofibrate 160 MG Tab take 1 tablet by mouth at bedtime 90 tablet 1    LORazepam (ATIVAN) 1 MG tablet take 1 tablet by mouth three times a day 30 tablet 0    metoprolol tartrate (LOPRESSOR) 25 MG tablet take 1 tablet by mouth once daily 30 tablet 8    omega-3 acid ethyl esters (LOVAZA) 1 gram capsule Take 2 g by mouth 2 (two) times daily.      rosuvastatin (CRESTOR) 5 MG tablet take 1 tablet by mouth once daily 90 tablet 3    [DISCONTINUED] metoprolol succinate (TOPROL-XL) 25 MG 24 hr tablet take 1 tablet by mouth once daily 30 tablet 5     Current Facility-Administered Medications on File Prior to Visit   Medication Dose Route Frequency Provider Last Rate Last Dose    EUFLEXXA 10 mg/mL(mw 2.4 -3.6 million) injection Syrg 20 mg  20 mg Intra-articular Weekly Mikael Huerta MD   20 mg at 08/29/17 1115     Social History:     Social History   Substance Use Topics    Smoking status: Never Smoker    Smokeless tobacco: Never Used    Alcohol use 4.2 oz/week     7 Cans of beer per week      Comment: patient states he drinks less than he use to     Family History:     Family History   Problem Relation Age of Onset    Lung cancer Father     Cancer Father     Diabetes Mother     Lung cancer Sister     Colon polyps Brother     Cirrhosis Neg Hx      Physical Exam:   Ht 5' 6" (1.676 m)   Wt 81.8 kg (180 lb 5.4 oz)   BMI 29.11 kg/m²      Constitutional: NAD, conversant  HEENT: Sclera anicteric, EOMI  Neck: No JVD  CV: Irregularly irregular, tachycardic 120s  Pulm: " CTAB, eupneic  GI: Abdomen soft, NTND  Extremities: No LE edema, warm  Skin: No ecchymosis, erythema, or ulcers  Psych: AOx3, appropriate affect  Neuro: No focal deficits    Labs:     Lab Results   Component Value Date     08/09/2017    K 4.6 08/09/2017     08/09/2017    CO2 29 08/09/2017    BUN 11 08/09/2017    CREATININE 1.2 08/09/2017    ANIONGAP 11 08/09/2017     Lab Results   Component Value Date    AST 25 08/09/2017    ALT 20 08/09/2017    ALKPHOS 51 (L) 08/09/2017    BILITOT 0.5 08/09/2017    BILIDIR 0.3 02/16/2017    ALBUMIN 4.0 08/09/2017     Lab Results   Component Value Date    CALCIUM 9.9 08/09/2017    MG 2.0 07/02/2014    PHOS 2.3 (L) 05/22/2014     Lab Results   Component Value Date    BNP 52 05/22/2014    Lab Results   Component Value Date    WBC 6.46 08/09/2017    HGB 15.9 08/09/2017    HCT 45.5 08/09/2017     (L) 08/09/2017    GRAN 3.3 08/09/2017    GRAN 50.6 08/09/2017     Lab Results   Component Value Date    INR 1.1 11/16/2016     Lab Results   Component Value Date    CHOL 185 08/09/2017    HDL 25 (L) 08/09/2017    LDLCALC 84.0 08/09/2017    TRIG 380 (H) 08/09/2017     Lab Results   Component Value Date    HGBA1C 6.1 (H) 08/09/2017     Lab Results   Component Value Date    TSH 2.538 02/06/2015          Imaging:   TTE 7/2014  1 - Normal left ventricular systolic function (EF 60-65%).     2 - Normal left ventricular diastolic function.     3 - Trivial mitral regurgitation.     4 - Mild left atrial enlargement.     5 - Mild pulmonic regurgitation.     6 - Normal right ventricular systolic function .     7 - Trivial tricuspid regurgitation.     EF   Date Value Ref Range Status   07/02/2014 60     04/16/2013 65         EKG: Reviewed.  AF noted 7/1/14 and 5/22/14, none on ECG since that time.   12/27/17: Coarse atrial fibrillation with RVR,  bpm, non-specific T wave flattening  Assessment and Plan:   Mr. Abadie is a 62-yo man with paroxysmal AF, now with more frequent symptoms.   His symptoms are from uncontrolled AF, at resting his HR is 110-120's but he has not taken any metoprolol this morning.  When he exerts himself (just walking) he says that his HR will go to the 150's, which explains his dyspnea with exertion.  He does not appear to be in clinical heart failure at present.      Paroxysmal AF  - CHADS-VASc 0 (does have pre-diabetes, last A1c 6.1) - On aspirin chronically for stroke prevention.    - Will start Eliquis 5mg po bid in preparation for upcoming procedure by Dr. Giang.  Explained the risks and benefits and alternatives, he agrees and understands.   - Continue metoprolol, tachycardic in clinic, gave a dose of metoprolol tartrate 25mg once now (in clinic).  Will give metoprolol 25mg po bid (tid if tolerated) as outpatient for now.  - Refer back to Dr. Giang for consideration of anti-arrhythmic/PVI  - Will check sleep study, though causation likely from alcohol intake  - TSH has always been normal  - Check TTE to evaluate structural function  - Will check Holter monitor to better establish outpatient AF burden and rate control on 25mg po bid metoprolol  - Cut back on alcohol intake - This is likely triggering his AF, stressed the need for cessation.    Follow up: 1 month after he sees Dr. Giang.     Signed:  Trent Lyles MD  Cardiology Fellow, PGY-6  12/27/2017 10:03 AM    Follow-up:   No future appointments.

## 2017-12-27 NOTE — PROGRESS NOTES
I have personally interviewed and examined the patient. I have reviewed the notes, assessments and plans performed by Dr Lyles and I CONCUR with his documentation of Donald Warren Abadie.

## 2017-12-28 ENCOUNTER — TELEPHONE (OUTPATIENT)
Dept: CARDIAC CATH/INVASIVE PROCEDURES | Facility: HOSPITAL | Age: 62
End: 2017-12-28

## 2017-12-28 NOTE — TELEPHONE ENCOUNTER
I spoke to Mr. Abadie and his daughter.  He was feeling better yesterday evening and today.  He was in afib during his echo and says he did not even feel it at that time.  He has the Holter and will f/u with Dr. Giang.  We discussed the echo results, all questions were answered.     Trent Lyles MD  Cardiology Fellow

## 2018-01-02 ENCOUNTER — HOSPITAL ENCOUNTER (OUTPATIENT)
Dept: CARDIOLOGY | Facility: CLINIC | Age: 63
Discharge: HOME | End: 2018-01-02
Payer: COMMERCIAL

## 2018-01-02 ENCOUNTER — OFFICE VISIT (OUTPATIENT)
Dept: ELECTROPHYSIOLOGY | Facility: CLINIC | Age: 63
End: 2018-01-02
Payer: COMMERCIAL

## 2018-01-02 VITALS
HEIGHT: 66 IN | HEART RATE: 53 BPM | BODY MASS INDEX: 29.16 KG/M2 | DIASTOLIC BLOOD PRESSURE: 81 MMHG | WEIGHT: 181.44 LBS | SYSTOLIC BLOOD PRESSURE: 137 MMHG

## 2018-01-02 DIAGNOSIS — E11.9 TYPE 2 DIABETES MELLITUS WITHOUT COMPLICATION, WITHOUT LONG-TERM CURRENT USE OF INSULIN: ICD-10-CM

## 2018-01-02 DIAGNOSIS — I48.91 ATRIAL FIBRILLATION, UNSPECIFIED TYPE: ICD-10-CM

## 2018-01-02 DIAGNOSIS — I48.91 ATRIAL FIBRILLATION WITH RVR: Primary | ICD-10-CM

## 2018-01-02 DIAGNOSIS — I48.0 PAROXYSMAL ATRIAL FIBRILLATION: ICD-10-CM

## 2018-01-02 PROCEDURE — 99214 OFFICE O/P EST MOD 30 MIN: CPT | Mod: S$GLB,,, | Performed by: INTERNAL MEDICINE

## 2018-01-02 PROCEDURE — 93000 ELECTROCARDIOGRAM COMPLETE: CPT | Mod: S$GLB,,, | Performed by: INTERNAL MEDICINE

## 2018-01-02 PROCEDURE — 99999 PR PBB SHADOW E&M-EST. PATIENT-LVL III: CPT | Mod: PBBFAC,,, | Performed by: INTERNAL MEDICINE

## 2018-01-02 RX ORDER — FLECAINIDE ACETATE 100 MG/1
100 TABLET ORAL 2 TIMES DAILY
Qty: 60 TABLET | Refills: 11 | Status: SHIPPED | OUTPATIENT
Start: 2018-01-02 | End: 2019-01-24 | Stop reason: SDUPTHER

## 2018-01-02 NOTE — LETTER
January 2, 2018      Trent Lyles MD  [address]  [facility] [address]           [facility]  [address]  [facility] [address]  Phone: [phone]  Fax: [phone]          Patient: Donald Warren Abadie   MR Number: [id number]   YOB: 1955   Date of Visit: 1/2/2018       Dear Dr. Trent Lyles:    Thank you for referring Donald Abadie to me for evaluation. Attached you will find relevant portions of my assessment and plan of care.    If you have questions, please do not hesitate to call me. I look forward to following Donald Abadie along with you.    Sincerely,    Ramon Giang MD    Enclosure  CC:  No Recipients    If you would like to receive this communication electronically, please contact [email or web address] or [phone] to request more information on [employer] access.    For providers and/or their staff who would like to refer a patient to [facility], please contact us through our one-stop-shop provider referral line, [facility], at [phone].    If you feel you have received this communication in error or would no longer like to receive these types of communications, please e-mail [email or web address]

## 2018-01-02 NOTE — PROGRESS NOTES
Subjective:    Patient ID:  Donald Warren Abadie is a 62 y.o. male who presents for follow-up of Atrial Fibrillation      62 yoM pAF here for follow up.    Prior visits: He felt palpitations 6/14 leading to an ER visit. There he was diagnosed with AF. He received diltiazem and ultimately got ativan which converted him to NSR. He felt fatigue, rapid palpitations. No chest pain, sob, syncope with his AF. He has had two other instances of AF, one while working in the heat. No known triggers or alleviating factors. He is on aspirin for CVA prophylaxis (CHADSVASC of 0). He denies ever experiencing similar symptoms prior to his diagnosis. He has had some hypotension on metoprolol 25 mg qd.     2/11/2015: He had a renal mass detected on CT scan performed for a hepatic workup. He ultimately had partial resection with ultimate diagnosis of RCC. He tolerated the surgery well without developing AF.    2/16: Pt reports that overall he feels well, but over the last month he reports experiencing palpitations when he goes to bed at night. He reports approximately 5 episodes this month.    8/16: He continues to have infrequent episodes, once every 2-4 weeks lasting up to 4-5 hours. He has dropped his metoprolol to 12.5 mg once a day due to fatigue.     5/17: Over the past month he has had episodes twice a week lasting 8 hours at a time. He feels that the episodes are related to EtOH intake. The episodes are lasting longer and are more symptomatic. He has stopped EtOH use which has reduced the AF episodes. He remains reluctant to take OAC. He was diagnosed with borderline DM.     Interval history: He developed symptomatic AF/RVR leading to urgent clinic visit with Dr Lyles. He was in AF/-120 bpm. Holter showed AF for 2 hours then. Episodes occur infrequently. AF lasted almost 2 days prior to presentation.     Past Medical History:  No date: A-fib  No date: Alcohol abuse  No date: Arthritis  No date: Chronic gout  No date:  Diabetes mellitus  11/16/2016: DM (diabetes mellitus)  No date: Fatty liver  No date: Hyperlipidemia  2014: Renal cell cancer    Past Surgical History:  No date: ABSCESS DRAINAGE      Comment: perirectal  No date: COLONOSCOPY  No date: HAND SURGERY Left  August 2014: PARTIAL NEPHRECTOMY Left    Social History    Marital status: Single              Spouse name:                       Years of education:                 Number of children:               Occupational History  Occupation          Employer            Comment                                   [employer]     Social History Main Topics    Smoking status: Never Smoker                                                                Smokeless tobacco: Never Used                        Alcohol use: Yes           4.2 oz/week       Cans of beer: 7 per week       Comment: patient states he drinks less than he use                to    Drug use: No              Sexual activity: Not on file          Other Topics            Concern    None on file    Social History Narrative    None on file    Review of patient's family history indicates:    Lung cancer                    Father                    Cancer                         Father                    Diabetes                       Mother                    Lung cancer                    Sister                    Colon polyps                   Brother                   Cirrhosis                      Neg Hx                          Review of Systems   Constitution: Negative. Negative for chills, decreased appetite, diaphoresis, fever, weakness, malaise/fatigue, night sweats, weight gain and weight loss.   HENT: Negative.    Eyes: Negative.  Negative for blurred vision, double vision, visual disturbance and visual halos.   Cardiovascular: Positive for irregular heartbeat and palpitations. Negative for chest pain, claudication, cyanosis, dyspnea on exertion, leg swelling, near-syncope, orthopnea, paroxysmal  nocturnal dyspnea and syncope.   Respiratory: Negative.  Negative for cough, hemoptysis, shortness of breath, sleep disturbances due to breathing, snoring, sputum production and wheezing.    Endocrine: Negative.  Negative for cold intolerance and heat intolerance.   Hematologic/Lymphatic: Negative.  Negative for adenopathy and bleeding problem. Does not bruise/bleed easily.   Skin: Negative.  Negative for color change, flushing, nail changes and poor wound healing.   Musculoskeletal: Negative.  Negative for falls, joint swelling, muscle cramps, muscle weakness, myalgias and neck pain.   Gastrointestinal: Negative.  Negative for heartburn, hematemesis, jaundice, nausea and vomiting.   Genitourinary: Negative.    Neurological: Negative.  Negative for disturbances in coordination, dizziness, focal weakness, light-headedness, loss of balance, numbness, paresthesias, seizures, sensory change, tremors and vertigo.   Psychiatric/Behavioral: Negative.  Negative for altered mental status and memory loss. The patient is not nervous/anxious.         Objective:    Physical Exam   Constitutional: He is oriented to person, place, and time. He appears well-developed and well-nourished. No distress.   HENT:   Head: Normocephalic and atraumatic.   Eyes: Conjunctivae and EOM are normal. Pupils are equal, round, and reactive to light. Right eye exhibits no discharge. Left eye exhibits no discharge.   Neck: Normal range of motion. Neck supple. No JVD present. No thyromegaly present.   Cardiovascular: Normal rate, regular rhythm, normal heart sounds and intact distal pulses.    No murmur heard.  Pulmonary/Chest: Effort normal and breath sounds normal. No respiratory distress. He has no wheezes.   Abdominal: Soft. Bowel sounds are normal. He exhibits no distension. There is no tenderness.   Musculoskeletal: Normal range of motion. He exhibits no edema.   Neurological: He is alert and oriented to person, place, and time. No cranial nerve  deficit.   Skin: Skin is warm and dry. No rash noted. He is not diaphoretic. No erythema.   Psychiatric: He has a normal mood and affect. His behavior is normal. Judgment and thought content normal.   Vitals reviewed.    ECG: SBr nl AR, QRS, QTc        Assessment:       1. Atrial fibrillation with RVR    2. Paroxysmal atrial fibrillation    3. Type 2 diabetes mellitus without complication, without long-term current use of insulin         Plan:       62 yoM pAF with increasing symptomatic episodes. I offered AAD therapy versus PVI. He opted for AAD- will start flecainide 100 mg bid. RTC 3m

## 2018-01-03 RX ORDER — METOPROLOL SUCCINATE 25 MG/1
TABLET, EXTENDED RELEASE ORAL
Qty: 30 TABLET | Refills: 5 | OUTPATIENT
Start: 2018-01-03

## 2018-01-05 RX ORDER — DICLOFENAC SODIUM 75 MG/1
TABLET, DELAYED RELEASE ORAL
Qty: 60 TABLET | Refills: 11 | Status: SHIPPED | OUTPATIENT
Start: 2018-01-05 | End: 2018-08-06

## 2018-01-08 RX ORDER — METOPROLOL SUCCINATE 25 MG/1
TABLET, EXTENDED RELEASE ORAL
Qty: 30 TABLET | Refills: 5 | Status: SHIPPED | OUTPATIENT
Start: 2018-01-08 | End: 2018-01-09

## 2018-01-08 RX ORDER — ALLOPURINOL 100 MG/1
TABLET ORAL
Qty: 60 TABLET | Refills: 11 | Status: SHIPPED | OUTPATIENT
Start: 2018-01-08 | End: 2019-01-08 | Stop reason: SDUPTHER

## 2018-01-08 RX ORDER — LORAZEPAM 1 MG/1
TABLET ORAL
Qty: 30 TABLET | Refills: 0 | Status: SHIPPED | OUTPATIENT
Start: 2018-01-08 | End: 2018-02-07 | Stop reason: SDUPTHER

## 2018-01-08 NOTE — TELEPHONE ENCOUNTER
----- Message from Rashmi Johnson sent at 1/8/2018 11:04 AM CST -----  Contact: self/   Please call to verify dr appointment with Dr Larry.

## 2018-01-09 ENCOUNTER — OFFICE VISIT (OUTPATIENT)
Dept: INTERNAL MEDICINE | Facility: CLINIC | Age: 63
End: 2018-01-09
Payer: COMMERCIAL

## 2018-01-09 ENCOUNTER — HOSPITAL ENCOUNTER (OUTPATIENT)
Dept: RADIOLOGY | Facility: HOSPITAL | Age: 63
Discharge: HOME | End: 2018-01-09
Attending: INTERNAL MEDICINE
Payer: COMMERCIAL

## 2018-01-09 VITALS
BODY MASS INDEX: 29.65 KG/M2 | HEIGHT: 66 IN | HEART RATE: 70 BPM | DIASTOLIC BLOOD PRESSURE: 70 MMHG | OXYGEN SATURATION: 98 % | SYSTOLIC BLOOD PRESSURE: 118 MMHG | WEIGHT: 184.5 LBS

## 2018-01-09 DIAGNOSIS — R22.2 SUPRACLAVICULAR FOSSA FULLNESS: Primary | ICD-10-CM

## 2018-01-09 DIAGNOSIS — R22.2 SUPRACLAVICULAR FOSSA FULLNESS: ICD-10-CM

## 2018-01-09 DIAGNOSIS — R73.09 ELEVATED GLUCOSE: ICD-10-CM

## 2018-01-09 DIAGNOSIS — K76.0 FATTY LIVER: ICD-10-CM

## 2018-01-09 LAB
BILIRUB UR QL STRIP: NEGATIVE
CLARITY UR REFRACT.AUTO: CLEAR
COLOR UR AUTO: NORMAL
CREAT UR-MCNC: 37 MG/DL
GLUCOSE UR QL STRIP: NEGATIVE
HGB UR QL STRIP: NEGATIVE
KETONES UR QL STRIP: NEGATIVE
LEUKOCYTE ESTERASE UR QL STRIP: NEGATIVE
MICROALBUMIN UR DL<=1MG/L-MCNC: 5 UG/ML
MICROALBUMIN/CREATININE RATIO: 13.5 UG/MG
NITRITE UR QL STRIP: NEGATIVE
PH UR STRIP: 6 [PH] (ref 5–8)
PROT UR QL STRIP: NEGATIVE
SP GR UR STRIP: 1 (ref 1–1.03)
URN SPEC COLLECT METH UR: NORMAL
UROBILINOGEN UR STRIP-ACNC: NEGATIVE EU/DL

## 2018-01-09 PROCEDURE — 82043 UR ALBUMIN QUANTITATIVE: CPT

## 2018-01-09 PROCEDURE — 99214 OFFICE O/P EST MOD 30 MIN: CPT | Mod: S$GLB,,, | Performed by: INTERNAL MEDICINE

## 2018-01-09 PROCEDURE — 99999 PR PBB SHADOW E&M-EST. PATIENT-LVL III: CPT | Mod: PBBFAC,,, | Performed by: INTERNAL MEDICINE

## 2018-01-09 PROCEDURE — 71046 X-RAY EXAM CHEST 2 VIEWS: CPT | Mod: 26,,, | Performed by: RADIOLOGY

## 2018-01-09 PROCEDURE — 71046 X-RAY EXAM CHEST 2 VIEWS: CPT | Mod: TC

## 2018-01-09 PROCEDURE — 81003 URINALYSIS AUTO W/O SCOPE: CPT

## 2018-01-09 NOTE — PROGRESS NOTES
CHIEF COMPLAINT:  Swollen glands.    HISTORY OF PRESENT ILLNESS:  The patient is a 62-year-old gentleman who we see   for AFib, diabetes, hyperlipidemia and hypertension, who comes in today with   complaints of swollen glands.  He states he was seen recently by Cardiology and   it was noted that he had some swelling in the supraclavicular fossa.  He does   feel like his throat has been a little bit sore.  The patient also reports he is   no longer drinking.  He stopped drinking little over four days ago.  He is to   drink up to 12 beers a day, had started to have problems with atrial   fibrillation, even if he drinks less, it still affects his heart, so he stopped.    REVIEW OF SYSTEMS:  No fever or chills.  Does report some sore throat, some   postnasal drip for which he takes Zyrtec.  The patient does complain of some   anxiety.  He is currently taking lorazepam 0.5 mg b.i.d., which keeps things   under control.  He had tried Prozac in the past for anxiety, but states it did   not agree with him.    PHYSICAL EXAMINATION:  GENERAL APPEARANCE:  No acute distress.  HEENT:  Trachea is midline without JVD.  The supraclavicular fossa appears to be   a little bit more prominent.  No discrete nodules were felt.  PULMONARY:  Good inspiratory, expiratory breath sounds are heard.  Lungs are   clear to auscultation.  CARDIOVASCULAR:  S1, S2.  EXTREMITIES:  Without edema.  ABDOMEN:  Nontender, nondistended, without hepatosplenomegaly.  LYMPHATICS:  No cervical or axillary adenopathy was appreciated.    ASSESSMENT:  1.  Fullness involving supraclavicular fossa.  2.  AFib.  3.  Fatty liver in a patient with a history of alcohol abuse.  4.  Elevated glucose on review of labs.    PLAN:  We will put the patient in for a chest x-ray, UA, urine for microalbumin,   lipid panel, A1c, CMP and CBC.  He is to follow up pending results.      ORLANDO/DEJON  dd: 01/09/2018 13:50:06 (CST)  td: 01/10/2018 07:08:15 (CST)  Doc ID   #[id number]  Job ID  #[id number]    CC:

## 2018-01-10 ENCOUNTER — TELEPHONE (OUTPATIENT)
Dept: INTERNAL MEDICINE | Facility: CLINIC | Age: 63
End: 2018-01-10

## 2018-01-11 ENCOUNTER — PATIENT MESSAGE (OUTPATIENT)
Dept: INTERNAL MEDICINE | Facility: CLINIC | Age: 63
End: 2018-01-11

## 2018-01-11 DIAGNOSIS — Z85.528 H/O RENAL CELL CANCER: ICD-10-CM

## 2018-01-11 DIAGNOSIS — R22.2 FULLNESS OF SUPRACLAVICULAR FOSSA: Primary | ICD-10-CM

## 2018-01-11 DIAGNOSIS — Z77.090 H/O ASBESTOS EXPOSURE: ICD-10-CM

## 2018-01-12 ENCOUNTER — TELEPHONE (OUTPATIENT)
Dept: INTERNAL MEDICINE | Facility: CLINIC | Age: 63
End: 2018-01-12

## 2018-01-12 NOTE — TELEPHONE ENCOUNTER
----- Message from Bacilio Larry MD sent at 1/11/2018  7:54 PM CST -----  Please contact and schedule a cat scan of the neck and chest. His chest x-ray looked normal. Orders are in.

## 2018-01-19 DIAGNOSIS — I48.0 PAROXYSMAL ATRIAL FIBRILLATION: ICD-10-CM

## 2018-01-19 RX ORDER — METOPROLOL TARTRATE 25 MG/1
25 TABLET, FILM COATED ORAL 2 TIMES DAILY
Qty: 60 TABLET | Refills: 11 | Status: SHIPPED | OUTPATIENT
Start: 2018-01-19 | End: 2018-02-07 | Stop reason: ALTCHOICE

## 2018-01-24 ENCOUNTER — TELEPHONE (OUTPATIENT)
Dept: INTERNAL MEDICINE | Facility: CLINIC | Age: 63
End: 2018-01-24

## 2018-01-29 ENCOUNTER — TELEPHONE (OUTPATIENT)
Dept: INTERNAL MEDICINE | Facility: CLINIC | Age: 63
End: 2018-01-29

## 2018-01-29 NOTE — TELEPHONE ENCOUNTER
----- Message from Bacilio Larry MD sent at 1/28/2018  3:16 PM CST -----  Please contact patient. He needs a cat scan of the chest and neck. Orders are in. Please assist in scheduling. He lives on the [address]. Please schedule where it is convenient for him.

## 2018-02-07 ENCOUNTER — OFFICE VISIT (OUTPATIENT)
Dept: CARDIOLOGY | Facility: CLINIC | Age: 63
End: 2018-02-07
Payer: COMMERCIAL

## 2018-02-07 VITALS
HEART RATE: 57 BPM | SYSTOLIC BLOOD PRESSURE: 122 MMHG | DIASTOLIC BLOOD PRESSURE: 61 MMHG | HEIGHT: 66 IN | WEIGHT: 186.06 LBS | BODY MASS INDEX: 29.9 KG/M2

## 2018-02-07 DIAGNOSIS — E11.9 TYPE 2 DIABETES MELLITUS WITHOUT COMPLICATION, WITHOUT LONG-TERM CURRENT USE OF INSULIN: ICD-10-CM

## 2018-02-07 DIAGNOSIS — I48.0 PAROXYSMAL ATRIAL FIBRILLATION: ICD-10-CM

## 2018-02-07 DIAGNOSIS — E78.5 HYPERLIPIDEMIA, UNSPECIFIED HYPERLIPIDEMIA TYPE: Primary | ICD-10-CM

## 2018-02-07 DIAGNOSIS — I48.91 ATRIAL FIBRILLATION WITH RVR: ICD-10-CM

## 2018-02-07 PROCEDURE — 99214 OFFICE O/P EST MOD 30 MIN: CPT | Mod: S$GLB,,, | Performed by: INTERNAL MEDICINE

## 2018-02-07 PROCEDURE — 99999 PR PBB SHADOW E&M-EST. PATIENT-LVL IV: CPT | Mod: PBBFAC,,, | Performed by: INTERNAL MEDICINE

## 2018-02-07 PROCEDURE — 3008F BODY MASS INDEX DOCD: CPT | Mod: S$GLB,,, | Performed by: INTERNAL MEDICINE

## 2018-02-07 RX ORDER — LORAZEPAM 1 MG/1
TABLET ORAL
Qty: 30 TABLET | Refills: 0 | Status: SHIPPED | OUTPATIENT
Start: 2018-02-07 | End: 2018-03-06 | Stop reason: SDUPTHER

## 2018-02-07 RX ORDER — METOPROLOL SUCCINATE 25 MG/1
25 TABLET, EXTENDED RELEASE ORAL DAILY
Qty: 30 TABLET | Refills: 11 | Status: SHIPPED | OUTPATIENT
Start: 2018-02-07 | End: 2019-02-22 | Stop reason: SDUPTHER

## 2018-02-07 RX ORDER — ROSUVASTATIN CALCIUM 10 MG/1
10 TABLET, COATED ORAL DAILY
Qty: 30 TABLET | Refills: 11 | Status: SHIPPED | OUTPATIENT
Start: 2018-02-07 | End: 2018-10-03

## 2018-02-07 NOTE — PROGRESS NOTES
Cardiology Clinic Note  Reason for Visit: Atrial fibrillation    HPI:   Mr. Abadie is a 62-yo man with RCC, DM (diet controlled), paroxysmal AF followed by Dr. Giang.  I last saw him a month ago for atrial fibrillation, where he was having increased frequency of episodes with fatigue and palpitations.  Echocardiogram showed normal biventricular function.  Holter monitor showed atrial fibrillation lasting only 1 hour, however he was in the last episode for several hours prior to this.  He saw Dr. Giang and was started on flecainide, only able to tolerate low-dose metoprolol due to fatigue.  He is on Eliquis for stroke prevention, no bleeding episodes.  When he is in regular rhythm, he is usually active and able to complete activities without difficulty.  He had a heavy intake of alcohol, likely triggering his atrial fibrillation episodes. He has cut back his drinking, quit briefly but has since gone back.  He has been tolerating his flecainide and feels that his atrial fibrillation is much better controlled, but he has still had some episodes.  He initially had some difficulty with compliance but is now taking it twice daily.      He has never had a sleep study, says that he is claustrophobic.     ROS:    Constitution: Negative for fever or chills. Negative for weight loss or gain.   HENT: Negative for sore throat or headaches. Negative for rhinorrhea.  Eyes: Negative for blurred or double vision.   Cardiovascular: See above  Pulmonary: Negative for SOB. Negative for cough.   Gastrointestinal: Negative for abdominal pain. Negative for nausea/ vomiting.   : Negative for dysuria.   Neurological: Negative for focal weakness or sensory changes.  PMH:     Past Medical History:   Diagnosis Date    A-fib     Alcohol abuse     Arthritis     Chronic gout     Diabetes mellitus     DM (diabetes mellitus) 11/16/2016    Fatty liver     Hyperlipidemia     Renal cell cancer 2014     Past Surgical History:    Procedure Laterality Date    ABSCESS DRAINAGE      perirectal    COLONOSCOPY      HAND SURGERY Left     PARTIAL NEPHRECTOMY Left August 2014     Allergies:     Review of patient's allergies indicates:   Allergen Reactions    No known drug allergies      Medications:     Current Outpatient Prescriptions on File Prior to Visit   Medication Sig Dispense Refill    allopurinol (ZYLOPRIM) 100 MG tablet take 2 tablets by mouth once daily 60 tablet 11    apixaban 5 mg Tab Take 1 tablet (5 mg total) by mouth 2 (two) times daily. 60 tablet 1    aspirin 81 MG Chew Take 1 tablet (81 mg total) by mouth once daily. 30 tablet 11    fenofibrate 160 MG Tab take 1 tablet by mouth at bedtime 90 tablet 1    flecainide (TAMBOCOR) 100 MG Tab Take 1 tablet (100 mg total) by mouth 2 (two) times daily. 60 tablet 11    LORazepam (ATIVAN) 1 MG tablet take 1 tablet by mouth three times a day 30 tablet 0    metoprolol tartrate (LOPRESSOR) 25 MG tablet Take 1 tablet (25 mg total) by mouth 2 (two) times daily. 60 tablet 11    omega-3 acid ethyl esters (LOVAZA) 1 gram capsule Take 2 g by mouth 2 (two) times daily.      omeprazole (PRILOSEC OTC) 20 MG tablet Take 20 mg by mouth once daily.      rosuvastatin (CRESTOR) 5 MG tablet take 1 tablet by mouth once daily 90 tablet 3    diclofenac (VOLTAREN) 75 MG EC tablet take 1 tablet by mouth twice a day 60 tablet 11    [DISCONTINUED] FLUARIX QUAD 9697-7362, PF, 60 mcg (15 mcg x 4)/0.5 mL vaccine inject 0.5 milliliter intramuscularly  0     Current Facility-Administered Medications on File Prior to Visit   Medication Dose Route Frequency Provider Last Rate Last Dose    EUFLEXXA 10 mg/mL(mw 2.4 -3.6 million) injection Syrg 20 mg  20 mg Intra-articular Weekly Mikael Huerta MD   20 mg at 08/29/17 1115     Social History:     Social History   Substance Use Topics    Smoking status: Never Smoker    Smokeless tobacco: Never Used    Alcohol use 4.2 oz/week     7 Cans of beer per  "week      Comment: patient states he drinks less than he use to     Family History:     Family History   Problem Relation Age of Onset    Lung cancer Father     Cancer Father     Diabetes Mother     Lung cancer Sister     Colon polyps Brother     Cirrhosis Neg Hx      Physical Exam:   Ht 5' 6" (1.676 m)   Wt 84.4 kg (186 lb 1.1 oz)   BMI 30.03 kg/m²      Constitutional: NAD, conversant  HEENT: Sclera anicteric, EOMI  Neck: No JVD  CV: Dustin rate and regular rhythm, no murmurs  Pulm: CTAB, eupneic  GI: Abdomen soft, NTND  Extremities: No LE edema, warm  Skin: No ecchymosis, erythema, or ulcers  Psych: AOx3, appropriate affect  Neuro: No focal deficits    Labs:     Lab Results   Component Value Date     01/09/2018    K 3.9 01/09/2018     01/09/2018    CO2 29 01/09/2018    BUN 7 (L) 01/09/2018    CREATININE 1.2 01/09/2018    ANIONGAP 9 01/09/2018     Lab Results   Component Value Date    AST 33 01/09/2018    ALT 22 01/09/2018    ALKPHOS 38 (L) 01/09/2018    BILITOT 0.6 01/09/2018    BILIDIR 0.3 02/16/2017    ALBUMIN 3.9 01/09/2018     Lab Results   Component Value Date    CALCIUM 9.5 01/09/2018    MG 2.0 07/02/2014    PHOS 2.3 (L) 05/22/2014     Lab Results   Component Value Date    BNP 52 05/22/2014    Lab Results   Component Value Date    WBC 8.62 01/09/2018    HGB 14.7 01/09/2018    HCT 43.2 01/09/2018     01/09/2018    GRAN 5.4 01/09/2018    GRAN 62.1 01/09/2018     Lab Results   Component Value Date    INR 1.1 11/16/2016     Lab Results   Component Value Date    CHOL 158 01/09/2018    HDL 35 (L) 01/09/2018    LDLCALC 101.2 01/09/2018    TRIG 109 01/09/2018     Lab Results   Component Value Date    HGBA1C 5.8 (H) 01/09/2018     Lab Results   Component Value Date    TSH 2.538 02/06/2015          Imaging:   TTE   1 - Normal left ventricular systolic function (EF 55-60%).     2 - No wall motion abnormalities.     3 - Normal left ventricular diastolic function.     4 - Right atrial " enlargement.     5 - Low normal right ventricular systolic function .     6 - Trivial aortic regurgitation.     7 - Trivial mitral regurgitation.     8 - Trivial to mild tricuspid regurgitation.     9 - The estimated PA systolic pressure is 19 mmHg.     EF   Date Value Ref Range Status   12/27/2017 55 55 - 65    07/02/2014 60     04/16/2013 65       Holter Monitor  1. Sinus rhythm with heart rates varying between 49 and 129 bpm with an average of 66 bpm.     2. Atrial fibrillation with ventricular rates varying between 100 and 115 bpmTotal time in atrial fibrillation was approximately 1.00 hrs.     3. The AF was seen in the first hour of the tracing.    VENTRICULAR ARRHYTHMIAS  1. There were very rare PVCs recorded totalling 3 and averaging less than 1 per hour.     2. There were no episodes of ventricular tachycardia.    SUPRA VENTRICULAR ARRHYTHMIAS  1. There were very rare PACs recorded totalling 2 and averaging less than 1 per hour.     2. There were no episodes of sustained supraventricular tachycardia.    3. Atrial fibrillation was noted for approximately 1.00 hours of the study.     SINUS NODE FUNCTION  1. For the 1st 24 hour period, the circadian pattern of sinus rate variability followed a typical curve with HRs averaging 68 bpm during waking hours and 60 bpm during sleep.     2. For the 2nd 24 hour period, the circadian pattern of sinus rate variability followed a typical curve with HRs averaging 66 bpm during waking hours and 68 bpm during sleep.     3. There was no evidence of high grade SA cornel block.     AV CONDUCTION  1. There was no evidence of high grade AV cornel filtering.     DIARY  1. The diary was returned, but not completed    MISCELLANEOUS  1. This was a tape of adequate length (48 hrs).     Assessment and Plan:   Mr. Abadie is a 62-yo man with paroxysmal AF, now with more frequent symptoms.  His symptoms are from uncontrolled AF, at resting his HR is 110-120's but he has not taken any  metoprolol this morning.  When he exerts himself (just walking) he says that his HR will go to the 150's, which explains his dyspnea with exertion.  He does not appear to be in clinical heart failure at present.      Paroxysmal AF  - CHADS-VASc 1 (DM) - Started on Eliquis last visit.  We discussed risks/benefits/alternatives, and he understands and wishes to continue.   - Continue flecainide 100mg bid and Toprol 25mg qdaily  - f/u with Dr. Giang  - Continue weaning alcohol  - Sleep study scheduled in a couple of weeks  - TSH has always been normal  - TTE as above    DM  - Diet controlled    HLD  - The 10-year ASCVD risk score (Ricardo DC Jr., et al., 2013) is: 19.3%    Values used to calculate the score:      Age: 62 years      Sex: Male      Is Non- : No      Diabetic: Yes      Tobacco smoker: No      Systolic Blood Pressure: 122 mmHg      Is BP treated: No      HDL Cholesterol: 35 mg/dL      Total Cholesterol: 158 mg/dL  - Increase rosuvastatin to 10mg     Hypertriglyceridemia  - Previously as high as 1300s  - Continue fenofibrate and rosuvastatin as above    Follow up: 6 months    Signed:  Trent Lyles MD  Cardiology Fellow, PGY-6  2/7/2018 10:03 AM    Follow-up:     Future Appointments  Date Time Provider Department Center   2/14/2018 1:30 PM María Elena Daugherty MD [facility] [place]

## 2018-03-05 RX ORDER — FENOFIBRATE 160 MG/1
TABLET ORAL
Qty: 90 TABLET | Refills: 1 | Status: SHIPPED | OUTPATIENT
Start: 2018-03-05 | End: 2018-06-27 | Stop reason: SDUPTHER

## 2018-03-06 RX ORDER — LORAZEPAM 1 MG/1
TABLET ORAL
Qty: 30 TABLET | Refills: 0 | Status: SHIPPED | OUTPATIENT
Start: 2018-03-06 | End: 2018-04-05 | Stop reason: SDUPTHER

## 2018-04-05 RX ORDER — LORAZEPAM 1 MG/1
TABLET ORAL
Qty: 30 TABLET | Refills: 0 | Status: SHIPPED | OUTPATIENT
Start: 2018-04-05 | End: 2018-05-03 | Stop reason: SDUPTHER

## 2018-05-03 RX ORDER — LORAZEPAM 1 MG/1
TABLET ORAL
Qty: 30 TABLET | Refills: 0 | Status: SHIPPED | OUTPATIENT
Start: 2018-05-03 | End: 2018-05-28 | Stop reason: SDUPTHER

## 2018-05-26 RX ORDER — ROSUVASTATIN CALCIUM 5 MG/1
TABLET, COATED ORAL
Qty: 90 TABLET | Refills: 3 | OUTPATIENT
Start: 2018-05-26

## 2018-05-30 NOTE — TELEPHONE ENCOUNTER
----- Message from Jay Ledezma sent at 5/30/2018  1:05 PM CDT -----  Contact: Pt   Pharmacy called regardingLORazepam (ATIVAN) 1 MG tablet  for the above patient need prior authorization   [facility] [phone]      Pt can be reached at  [phone]

## 2018-05-31 RX ORDER — LORAZEPAM 1 MG/1
TABLET ORAL
Qty: 30 TABLET | Refills: 0 | Status: SHIPPED | OUTPATIENT
Start: 2018-05-31 | End: 2018-07-28 | Stop reason: SDUPTHER

## 2018-05-31 RX ORDER — LORAZEPAM 1 MG/1
1 TABLET ORAL EVERY 8 HOURS PRN
Qty: 30 TABLET | Refills: 0 | Status: SHIPPED | OUTPATIENT
Start: 2018-05-31 | End: 2018-06-27 | Stop reason: SDUPTHER

## 2018-06-27 RX ORDER — FENOFIBRATE 160 MG/1
160 TABLET ORAL NIGHTLY
Qty: 90 TABLET | Refills: 0 | Status: SHIPPED | OUTPATIENT
Start: 2018-06-27 | End: 2018-10-29 | Stop reason: SDUPTHER

## 2018-06-27 RX ORDER — LORAZEPAM 1 MG/1
1 TABLET ORAL EVERY 8 HOURS PRN
Qty: 15 TABLET | Refills: 0 | Status: SHIPPED | OUTPATIENT
Start: 2018-06-27 | End: 2018-07-28 | Stop reason: SDUPTHER

## 2018-06-27 RX ORDER — LORAZEPAM 1 MG/1
TABLET ORAL
Qty: 30 TABLET | Refills: 0 | Status: SHIPPED | OUTPATIENT
Start: 2018-06-27 | End: 2018-07-27 | Stop reason: SDUPTHER

## 2018-06-27 RX ORDER — FENOFIBRATE 160 MG/1
TABLET ORAL
Qty: 90 TABLET | Refills: 0 | Status: CANCELLED | OUTPATIENT
Start: 2018-06-27

## 2018-06-27 NOTE — TELEPHONE ENCOUNTER
----- Message from Marcelle Lee sent at 6/27/2018  1:56 PM CDT -----  Contact: Christen/Taras/[phone]  Christen called in regards needing to f/u on refill on medication. fenofibrate 160 MG TAKE 1 TABLET BY MOUTH AT BEDTIME lorazepam 1 MG   Please call and advise. Thank you!!!

## 2018-07-27 DIAGNOSIS — E11.9 TYPE 2 DIABETES MELLITUS WITHOUT COMPLICATION: ICD-10-CM

## 2018-07-27 NOTE — TELEPHONE ENCOUNTER
----- Message from Cindy Tapia sent at 7/27/2018 11:27 AM CDT -----  Contact: [facility]  [phone]  Type: Rx    Name of medication(s): LORazepam (ATIVAN) 1 MG tablet    Is this a refill? New rx? refill    Who prescribed medication? Laron    Pharmacy Name, Phone, & Location: [facility] [address] [facility], [place] - [address]  AT [facility] [address]    Comments: Please advise, thank you

## 2018-07-28 ENCOUNTER — LAB VISIT (OUTPATIENT)
Dept: LAB | Facility: HOSPITAL | Age: 63
End: 2018-07-28
Attending: INTERNAL MEDICINE
Payer: COMMERCIAL

## 2018-07-28 ENCOUNTER — OFFICE VISIT (OUTPATIENT)
Dept: INTERNAL MEDICINE | Facility: CLINIC | Age: 63
End: 2018-07-28
Payer: COMMERCIAL

## 2018-07-28 VITALS
HEART RATE: 67 BPM | SYSTOLIC BLOOD PRESSURE: 100 MMHG | WEIGHT: 183.44 LBS | OXYGEN SATURATION: 98 % | DIASTOLIC BLOOD PRESSURE: 62 MMHG | BODY MASS INDEX: 29.48 KG/M2 | TEMPERATURE: 98 F | HEIGHT: 66 IN

## 2018-07-28 DIAGNOSIS — R19.7 DIARRHEA, UNSPECIFIED TYPE: ICD-10-CM

## 2018-07-28 DIAGNOSIS — R74.8 ELEVATED LIVER ENZYMES: ICD-10-CM

## 2018-07-28 DIAGNOSIS — I48.91 ATRIAL FIBRILLATION, UNSPECIFIED TYPE: ICD-10-CM

## 2018-07-28 DIAGNOSIS — E11.9 TYPE 2 DIABETES MELLITUS WITHOUT COMPLICATION: ICD-10-CM

## 2018-07-28 DIAGNOSIS — R74.8 ELEVATED LIVER ENZYMES: Primary | ICD-10-CM

## 2018-07-28 LAB
ALBUMIN SERPL BCP-MCNC: 4.2 G/DL
ALP SERPL-CCNC: 67 U/L
ALT SERPL W/O P-5'-P-CCNC: 44 U/L
ANION GAP SERPL CALC-SCNC: 12 MMOL/L
AST SERPL-CCNC: 98 U/L
BASOPHILS # BLD AUTO: 0.04 K/UL
BASOPHILS NFR BLD: 0.5 %
BILIRUB SERPL-MCNC: 2.8 MG/DL
BUN SERPL-MCNC: 7 MG/DL
CALCIUM SERPL-MCNC: 10.1 MG/DL
CHLORIDE SERPL-SCNC: 97 MMOL/L
CO2 SERPL-SCNC: 24 MMOL/L
CREAT SERPL-MCNC: 1.2 MG/DL
DIFFERENTIAL METHOD: ABNORMAL
EOSINOPHIL # BLD AUTO: 0.1 K/UL
EOSINOPHIL NFR BLD: 0.6 %
ERYTHROCYTE [DISTWIDTH] IN BLOOD BY AUTOMATED COUNT: 13 %
EST. GFR  (AFRICAN AMERICAN): >60 ML/MIN/1.73 M^2
EST. GFR  (NON AFRICAN AMERICAN): >60 ML/MIN/1.73 M^2
ESTIMATED AVG GLUCOSE: 160 MG/DL
GLUCOSE SERPL-MCNC: 219 MG/DL
HBA1C MFR BLD HPLC: 7.2 %
HCT VFR BLD AUTO: 47 %
HGB BLD-MCNC: 16.1 G/DL
LYMPHOCYTES # BLD AUTO: 1.7 K/UL
LYMPHOCYTES NFR BLD: 20.5 %
MCH RBC QN AUTO: 32.5 PG
MCHC RBC AUTO-ENTMCNC: 34.3 G/DL
MCV RBC AUTO: 95 FL
MONOCYTES # BLD AUTO: 0.9 K/UL
MONOCYTES NFR BLD: 10.3 %
NEUTROPHILS # BLD AUTO: 5.7 K/UL
NEUTROPHILS NFR BLD: 68 %
PLATELET # BLD AUTO: 134 K/UL
PMV BLD AUTO: 8.9 FL
POTASSIUM SERPL-SCNC: 3.5 MMOL/L
PROT SERPL-MCNC: 7.6 G/DL
RBC # BLD AUTO: 4.95 M/UL
SODIUM SERPL-SCNC: 133 MMOL/L
WBC # BLD AUTO: 8.36 K/UL

## 2018-07-28 PROCEDURE — 3008F BODY MASS INDEX DOCD: CPT | Mod: CPTII,S$GLB,, | Performed by: INTERNAL MEDICINE

## 2018-07-28 PROCEDURE — 36415 COLL VENOUS BLD VENIPUNCTURE: CPT

## 2018-07-28 PROCEDURE — 3078F DIAST BP <80 MM HG: CPT | Mod: CPTII,S$GLB,, | Performed by: INTERNAL MEDICINE

## 2018-07-28 PROCEDURE — 3074F SYST BP LT 130 MM HG: CPT | Mod: CPTII,S$GLB,, | Performed by: INTERNAL MEDICINE

## 2018-07-28 PROCEDURE — 80053 COMPREHEN METABOLIC PANEL: CPT

## 2018-07-28 PROCEDURE — 85025 COMPLETE CBC W/AUTO DIFF WBC: CPT

## 2018-07-28 PROCEDURE — 80074 ACUTE HEPATITIS PANEL: CPT

## 2018-07-28 PROCEDURE — 99213 OFFICE O/P EST LOW 20 MIN: CPT | Mod: S$GLB,,, | Performed by: INTERNAL MEDICINE

## 2018-07-28 PROCEDURE — 99999 PR PBB SHADOW E&M-EST. PATIENT-LVL V: CPT | Mod: PBBFAC,,, | Performed by: INTERNAL MEDICINE

## 2018-07-28 PROCEDURE — 83036 HEMOGLOBIN GLYCOSYLATED A1C: CPT

## 2018-07-28 RX ORDER — LORAZEPAM 1 MG/1
TABLET ORAL
Qty: 30 TABLET | Refills: 0 | Status: SHIPPED | OUTPATIENT
Start: 2018-07-28 | End: 2018-09-04 | Stop reason: SDUPTHER

## 2018-07-29 ENCOUNTER — PATIENT MESSAGE (OUTPATIENT)
Dept: INTERNAL MEDICINE | Facility: CLINIC | Age: 63
End: 2018-07-29

## 2018-07-29 NOTE — PROGRESS NOTES
Subjective:       Patient ID: Donald Warren Abadie is a 62 y.o. male.    Chief Complaint: Diarrhea    HPI  He complains of a four-day history of diarrhea.  Denies vomiting.  No abdominal pain. No blood in stool.  He has not traveled outside the [place] recently.  No fever, chills, night sweats  Review of Systems   Constitutional: Negative for chills, fatigue, fever and unexpected weight change.   Respiratory: Negative for chest tightness and shortness of breath.    Cardiovascular: Negative for chest pain and palpitations.   Gastrointestinal: Negative for abdominal pain and blood in stool.   Neurological: Negative for dizziness, syncope, numbness and headaches.       Objective:      Physical Exam   HENT:   Right Ear: External ear normal.   Left Ear: External ear normal.   Nose: Nose normal.   Mouth/Throat: Oropharynx is clear and moist.   Eyes: Pupils are equal, round, and reactive to light.   Neck: Normal range of motion.   Cardiovascular: Normal rate and regular rhythm.    No murmur heard.  Pulmonary/Chest: Breath sounds normal.   Abdominal: He exhibits no distension. There is no hepatomegaly. There is no tenderness.   Lymphadenopathy:     He has no cervical adenopathy.     He has no axillary adenopathy.   Neurological: He has normal strength and normal reflexes. No cranial nerve deficit or sensory deficit.       Assessment/Plan     assessment and plan:  Diarrhea:  Check CMP, CBC, hepatitis panel, stool sent for culture, OCP, WBC, RBC.  Recommended liberal fluid intake.  He was here for urgent care only appointment.  He will follow up with his primary care physician for a physical

## 2018-07-30 ENCOUNTER — TELEPHONE (OUTPATIENT)
Dept: INTERNAL MEDICINE | Facility: CLINIC | Age: 63
End: 2018-07-30

## 2018-07-30 ENCOUNTER — PATIENT MESSAGE (OUTPATIENT)
Dept: HEPATOLOGY | Facility: CLINIC | Age: 63
End: 2018-07-30

## 2018-07-30 DIAGNOSIS — R74.8 ELEVATED LIVER ENZYMES: Primary | ICD-10-CM

## 2018-07-30 LAB
HAV IGM SERPL QL IA: NEGATIVE
HBV CORE IGM SERPL QL IA: NEGATIVE
HBV SURFACE AG SERPL QL IA: NEGATIVE
HCV AB SERPL QL IA: NEGATIVE

## 2018-07-30 NOTE — TELEPHONE ENCOUNTER
Please contact patient and advise him to follow up with hepatology as previously recommended.    Please schedule hepatology appt

## 2018-07-31 ENCOUNTER — LAB VISIT (OUTPATIENT)
Dept: LAB | Facility: HOSPITAL | Age: 63
End: 2018-07-31
Attending: INTERNAL MEDICINE
Payer: COMMERCIAL

## 2018-07-31 ENCOUNTER — OFFICE VISIT (OUTPATIENT)
Dept: CARDIOLOGY | Facility: CLINIC | Age: 63
End: 2018-07-31
Payer: COMMERCIAL

## 2018-07-31 ENCOUNTER — DOCUMENTATION ONLY (OUTPATIENT)
Dept: TRANSPLANT | Facility: CLINIC | Age: 63
End: 2018-07-31

## 2018-07-31 VITALS
DIASTOLIC BLOOD PRESSURE: 63 MMHG | BODY MASS INDEX: 29.8 KG/M2 | SYSTOLIC BLOOD PRESSURE: 100 MMHG | WEIGHT: 185.44 LBS | HEIGHT: 66 IN | HEART RATE: 60 BPM

## 2018-07-31 DIAGNOSIS — R19.7 DIARRHEA, UNSPECIFIED TYPE: ICD-10-CM

## 2018-07-31 DIAGNOSIS — E78.5 HYPERLIPIDEMIA, UNSPECIFIED HYPERLIPIDEMIA TYPE: ICD-10-CM

## 2018-07-31 DIAGNOSIS — F10.10 ALCOHOL ABUSE: ICD-10-CM

## 2018-07-31 DIAGNOSIS — I48.0 PAROXYSMAL ATRIAL FIBRILLATION: Primary | ICD-10-CM

## 2018-07-31 LAB
C DIFF GDH STL QL: NEGATIVE
C DIFF TOX A+B STL QL IA: NEGATIVE

## 2018-07-31 PROCEDURE — 87427 SHIGA-LIKE TOXIN AG IA: CPT

## 2018-07-31 PROCEDURE — 82272 OCCULT BLD FECES 1-3 TESTS: CPT

## 2018-07-31 PROCEDURE — 87045 FECES CULTURE AEROBIC BACT: CPT

## 2018-07-31 PROCEDURE — 87324 CLOSTRIDIUM AG IA: CPT

## 2018-07-31 PROCEDURE — 3074F SYST BP LT 130 MM HG: CPT | Mod: CPTII,S$GLB,, | Performed by: STUDENT IN AN ORGANIZED HEALTH CARE EDUCATION/TRAINING PROGRAM

## 2018-07-31 PROCEDURE — 87046 STOOL CULTR AEROBIC BACT EA: CPT

## 2018-07-31 PROCEDURE — 3008F BODY MASS INDEX DOCD: CPT | Mod: CPTII,S$GLB,, | Performed by: STUDENT IN AN ORGANIZED HEALTH CARE EDUCATION/TRAINING PROGRAM

## 2018-07-31 PROCEDURE — 99213 OFFICE O/P EST LOW 20 MIN: CPT | Mod: S$GLB,,, | Performed by: STUDENT IN AN ORGANIZED HEALTH CARE EDUCATION/TRAINING PROGRAM

## 2018-07-31 PROCEDURE — 89055 LEUKOCYTE ASSESSMENT FECAL: CPT

## 2018-07-31 PROCEDURE — 3078F DIAST BP <80 MM HG: CPT | Mod: CPTII,S$GLB,, | Performed by: STUDENT IN AN ORGANIZED HEALTH CARE EDUCATION/TRAINING PROGRAM

## 2018-07-31 PROCEDURE — 87209 SMEAR COMPLEX STAIN: CPT

## 2018-07-31 PROCEDURE — 99999 PR PBB SHADOW E&M-EST. PATIENT-LVL V: CPT | Mod: PBBFAC,,, | Performed by: STUDENT IN AN ORGANIZED HEALTH CARE EDUCATION/TRAINING PROGRAM

## 2018-07-31 NOTE — ASSESSMENT & PLAN NOTE
- Patient continues to have palpitations about every other day  - Patient is not taking his metoprolol regularly due to boderline BP we encouraged him to take his BB as this does not reduce BP significantly  - Will get 48hr holter monitor, emphasize BB therapy and reduce alcohol use  -EILEEN 1, on eliquis  - Cont flecanide and send to Electrophysiology

## 2018-07-31 NOTE — NURSING
Referral rec. For EP of HEpatology    [facility]IRA [facility] Provider: Vira Jaime MD  Diagnosis: Elevated liver enzymes     Message to Hepatology staff

## 2018-07-31 NOTE — TELEPHONE ENCOUNTER
----- Message from Bacilio Larry MD sent at 7/30/2018  5:58 PM CDT -----  Please call the patient regarding his abnormal result.His A1C showed that his blood sugars are trending up. Please schedule a follow up appointment with me.

## 2018-07-31 NOTE — PROGRESS NOTES
Subjective:    Patient ID:  Donald Warren Abadie is a 62 y.o. male who presents for follow-up of Shortness of Breath; Hyperlipidemia; Dizziness; Fatigue; and Atrial Fibrillation      HPI  He had a week of binge drinking (1 case of beers a day) followed by diarrhea, vomiting, and SOB. He also got short of breath on ambulation which resolved after binge. He denies chest pain at all. He continues to drink heavily. He says he has palpitations every other day. His ativan helps his palpitations and fast heart rate.    Review of Systems   Constitution: Negative for chills, decreased appetite and diaphoresis.   HENT: Negative for congestion and ear discharge.    Eyes: Negative for blurred vision and discharge.   Cardiovascular: Positive for dyspnea on exertion and palpitations. Negative for chest pain and leg swelling.   Respiratory: Negative for cough and hemoptysis.    Gastrointestinal: Negative for bloating and abdominal pain.   Genitourinary: Negative for bladder incontinence and decreased libido.           Objective:    Physical Exam   Constitutional: He is oriented to person, place, and time. He appears well-developed and well-nourished. No distress.   HENT:   Head: Normocephalic and atraumatic.   Eyes: Conjunctivae and EOM are normal. Pupils are equal, round, and reactive to light.   Neck: Normal range of motion. Neck supple. No JVD present. No tracheal deviation present.   Cardiovascular: Normal rate, regular rhythm and normal heart sounds.  Exam reveals no friction rub.    No murmur heard.  Pulmonary/Chest: Effort normal and breath sounds normal. No respiratory distress. He has no wheezes.   Abdominal: Soft. Bowel sounds are normal. He exhibits distension.   Musculoskeletal: Normal range of motion. He exhibits no edema or deformity.   Neurological: He is alert and oriented to person, place, and time.   Skin: He is not diaphoretic.         Assessment:       1. Paroxysmal atrial fibrillation    2. Alcohol abuse    3.  Hyperlipidemia, unspecified hyperlipidemia type         Plan:       Alcohol abuse  - Drinks between 12-15 cans of 12 Oz beers daily, but went on a binge last week drinking a whole case dialy and very little food.  - Patients' alcohol abuse is driving his Atrial fibrillation. I explained to him that even DCCV without addressing alcohol abuse  - Noted elevated liver enzymes and bilirubin, high risk of cirrhosis if does not quit    Hyperlipidemia  -  last check  - Continue Crestor 10, monitor liver enzymes    Paroxysmal atrial fibrillation  - Patient continues to have palpitations about every other day  - Patient is not taking his metoprolol regularly due to boderline BP we encouraged him to take his BB as this does not reduce BP significantly  - Will get 48hr holter monitor, emphasize BB therapy and reduce alcohol use  -CHADSVASC 1, on eliquis  - Cont flecanide and send to Electrophysiology

## 2018-07-31 NOTE — ASSESSMENT & PLAN NOTE
- Drinks between 12-15 cans of 12 Oz beers daily, but went on a binge last week drinking a whole case dialy and very little food.  - Patients' alcohol abuse is driving his Atrial fibrillation. I explained to him that even DCCV without addressing alcohol abuse  - Noted elevated liver enzymes and bilirubin, high risk of cirrhosis if does not quit

## 2018-07-31 NOTE — PATIENT INSTRUCTIONS
- Please try to cut down your alcohol intake as it is driving your Atrial Fibrillation  - Please take your Metoprolol daily it won't drop your pressure too low

## 2018-08-01 ENCOUNTER — CLINICAL SUPPORT (OUTPATIENT)
Dept: CARDIOLOGY | Facility: CLINIC | Age: 63
End: 2018-08-01
Attending: STUDENT IN AN ORGANIZED HEALTH CARE EDUCATION/TRAINING PROGRAM
Payer: COMMERCIAL

## 2018-08-01 DIAGNOSIS — I48.0 PAROXYSMAL ATRIAL FIBRILLATION: ICD-10-CM

## 2018-08-01 LAB
O+P STL TRI STN: NORMAL
OB PNL STL: NEGATIVE
WBC #/AREA STL HPF: NORMAL /[HPF]

## 2018-08-01 PROCEDURE — 93224 XTRNL ECG REC UP TO 48 HRS: CPT | Mod: S$GLB,,, | Performed by: INTERNAL MEDICINE

## 2018-08-01 NOTE — PROGRESS NOTES
Verified pt name and .  Pt signed consent stated that the monitor will be returned to 8th floor Cardiology at the [facility].  Pt prepped and EKG leads placed.  Pt to be on holter monitor for 48 hours.  Instructions, diary and extra EKG pads provided.

## 2018-08-01 NOTE — PROGRESS NOTES
I have personally taken the history and examined this patient and agree with the fellow's note as stated above.Patient voices awareness of the affect ETOH is having on his health and expresses a desire to handle the issue. Holter to assess the afib burden and refer back to EP.

## 2018-08-02 ENCOUNTER — DOCUMENTATION ONLY (OUTPATIENT)
Dept: TRANSPLANT | Facility: CLINIC | Age: 63
End: 2018-08-02

## 2018-08-02 LAB
E COLI SXT1 STL QL IA: NEGATIVE
E COLI SXT2 STL QL IA: NEGATIVE

## 2018-08-02 NOTE — NURSING
Pt records reviewed.   Pt will be referred to Hepatology.    Initial referral received  from the workque.   Referring Provider/diagnosis  VIRA JAIME Provider: Vira Jaime MD   Diagnosis: Elevated liver enzymes      Pt already EP of hepatology and is being scheduled for follow up.       Referral letter sent to patient.

## 2018-08-03 DIAGNOSIS — E11.9 TYPE 2 DIABETES MELLITUS WITHOUT COMPLICATION, UNSPECIFIED WHETHER LONG TERM INSULIN USE: ICD-10-CM

## 2018-08-04 LAB — BACTERIA STL CULT: NORMAL

## 2018-08-06 ENCOUNTER — OFFICE VISIT (OUTPATIENT)
Dept: INTERNAL MEDICINE | Facility: CLINIC | Age: 63
End: 2018-08-06
Payer: COMMERCIAL

## 2018-08-06 ENCOUNTER — TELEPHONE (OUTPATIENT)
Dept: INTERNAL MEDICINE | Facility: CLINIC | Age: 63
End: 2018-08-06

## 2018-08-06 VITALS
HEIGHT: 66 IN | HEART RATE: 61 BPM | DIASTOLIC BLOOD PRESSURE: 60 MMHG | BODY MASS INDEX: 29.87 KG/M2 | OXYGEN SATURATION: 98 % | SYSTOLIC BLOOD PRESSURE: 110 MMHG | WEIGHT: 185.88 LBS

## 2018-08-06 DIAGNOSIS — I48.91 ATRIAL FIBRILLATION WITH RVR: ICD-10-CM

## 2018-08-06 DIAGNOSIS — E78.5 HYPERLIPIDEMIA, UNSPECIFIED HYPERLIPIDEMIA TYPE: ICD-10-CM

## 2018-08-06 DIAGNOSIS — F10.10 ALCOHOL ABUSE: ICD-10-CM

## 2018-08-06 DIAGNOSIS — E11.9 TYPE 2 DIABETES MELLITUS WITHOUT COMPLICATION, WITH LONG-TERM CURRENT USE OF INSULIN: Primary | ICD-10-CM

## 2018-08-06 DIAGNOSIS — Z79.4 TYPE 2 DIABETES MELLITUS WITHOUT COMPLICATION, WITH LONG-TERM CURRENT USE OF INSULIN: Primary | ICD-10-CM

## 2018-08-06 PROCEDURE — 3074F SYST BP LT 130 MM HG: CPT | Mod: CPTII,S$GLB,, | Performed by: INTERNAL MEDICINE

## 2018-08-06 PROCEDURE — 3045F PR MOST RECENT HEMOGLOBIN A1C LEVEL 7.0-9.0%: CPT | Mod: CPTII,S$GLB,, | Performed by: INTERNAL MEDICINE

## 2018-08-06 PROCEDURE — 99999 PR PBB SHADOW E&M-EST. PATIENT-LVL IV: CPT | Mod: PBBFAC,,, | Performed by: INTERNAL MEDICINE

## 2018-08-06 PROCEDURE — 3078F DIAST BP <80 MM HG: CPT | Mod: CPTII,S$GLB,, | Performed by: INTERNAL MEDICINE

## 2018-08-06 PROCEDURE — 3008F BODY MASS INDEX DOCD: CPT | Mod: CPTII,S$GLB,, | Performed by: INTERNAL MEDICINE

## 2018-08-06 PROCEDURE — 99215 OFFICE O/P EST HI 40 MIN: CPT | Mod: S$GLB,,, | Performed by: INTERNAL MEDICINE

## 2018-08-06 NOTE — TELEPHONE ENCOUNTER
Attempted to contact pt no success, left voice message.  Rescheduled appt with Dr. Abebe at same time.

## 2018-08-06 NOTE — PROGRESS NOTES
Chief Complaint: Elevated blood sugars    HPI:This is a 62 year old man who presents for elevated blood sugars. HIs hemoglobin A1C on 18 was 7.2.  He is not taking any medication for diabetes. He is here with his daughter. HE normally drinks a case of beer a day.  He had nasuea, vomiting and diarrhea last week and saw Dr Maguire on 18.  HE has cut back on his alcohol intake to 5-6 beers a day and is feeling much better. He does not follow a diabetic diet.     He takes Eliquis 5 mg twice dialy, metoprolol succinate 25 mg daily for a fib.  He takes crestor 10 mg daily, fenofibrate 160 mg daily and fish oil daily for his hyperlipidemia. GOut is controlled on allopurinol 100 mg 2 tablets daily.  He takes ranitidine 150 mg twice daily which controlls reflux. No chest pain, shortness of breath, palpitations.    Past Medical History:   Diagnosis Date    A-fib     Alcohol abuse     Arthritis     Chronic gout     Diabetes mellitus     DM (diabetes mellitus) 2016    Fatty liver     Hyperlipidemia     Renal cell cancer      Past Surgical History:   Procedure Laterality Date    ABSCESS DRAINAGE      perirectal    COLONOSCOPY      HAND SURGERY Left     PARTIAL NEPHRECTOMY Left 2014     Social History     Social History    Marital status: Single     Spouse name: N/A    Number of children: N/A    Years of education: N/A     Occupational History     [employer]     Social History Main Topics    Smoking status: Never Smoker    Smokeless tobacco: Never Used    Alcohol use 4.2 oz/week     7 Cans of beer per week      Comment: patient states he drinks less than he use to    Drug use: No    Sexual activity: Not on file     Other Topics Concern    Not on file     Social History Narrative    No narrative on file     Family Status   Relation Status    Father     Mother     Sister (Not Specified)    Brother (Not Specified)    Neg Hx (Not Specified)       Meds  "and allergies: updated on EPIC      Physical exam:  /60 (BP Location: Right arm, Patient Position: Sitting, BP Method: Medium (Manual))   Pulse 61   Ht 5' 6" (1.676 m)   Wt 84.3 kg (185 lb 14.4 oz)   SpO2 98%   BMI 30.01 kg/m²     General: alert, oriented x 3, no apparent distress.  Affect normal  HEENT: Conjunctivae: anicteric, PERRL, EOMI, TM clear, Oralpharynx clear  Neck: supple, no thyroid enlargement, no cervical lymphadenopathy  Resp: effort normal, lungs clear bilaterally  CV: Regular rate and rhythm without murmurs, gallops or rubs, no lower extremity edema  Abdomen: soft, non-distended, non-tender, bowel sounds present, no hepatosplenomegaly.    Assessment/Plan:  Diabetes- long discusion regarding alcohol cessation  A fib - discussed that drinking alcohol can increase his risk for hemorrhage with eliquis  Hyperlipidemia - lipid will improve with stopping alcohol  Gout - will improve with stopping alcohol  Had a very long discussion regarding consequences of alcohol abuse. Gave phone number to psychaitry for help. Suggested AA  He is to follow up with dr Larry in 3 months, sooner if problems arise.    Spent greater than 40 minutes with the patient, greater than 50% in face to face counseling.        "

## 2018-09-04 RX ORDER — LORAZEPAM 1 MG/1
TABLET ORAL
Qty: 30 TABLET | Refills: 0 | Status: SHIPPED | OUTPATIENT
Start: 2018-09-04 | End: 2018-09-25 | Stop reason: SDUPTHER

## 2018-09-25 ENCOUNTER — HOSPITAL ENCOUNTER (EMERGENCY)
Facility: HOSPITAL | Age: 63
Discharge: HOME | End: 2018-09-25
Payer: COMMERCIAL

## 2018-09-25 ENCOUNTER — TELEPHONE (OUTPATIENT)
Dept: INTERNAL MEDICINE | Facility: CLINIC | Age: 63
End: 2018-09-25

## 2018-09-25 VITALS
OXYGEN SATURATION: 96 % | SYSTOLIC BLOOD PRESSURE: 144 MMHG | BODY MASS INDEX: 27.8 KG/M2 | DIASTOLIC BLOOD PRESSURE: 79 MMHG | HEART RATE: 54 BPM | HEIGHT: 66 IN | WEIGHT: 173 LBS | RESPIRATION RATE: 20 BRPM

## 2018-09-25 DIAGNOSIS — R06.02 SHORTNESS OF BREATH: ICD-10-CM

## 2018-09-25 DIAGNOSIS — R07.9 CHEST PAIN: ICD-10-CM

## 2018-09-25 DIAGNOSIS — I48.0 PAROXYSMAL ATRIAL FIBRILLATION: Primary | ICD-10-CM

## 2018-09-25 DIAGNOSIS — F10.10 ALCOHOL ABUSE: ICD-10-CM

## 2018-09-25 LAB
ALBUMIN SERPL BCP-MCNC: 4.4 G/DL
ALP SERPL-CCNC: 53 U/L
ALT SERPL W/O P-5'-P-CCNC: 30 U/L
ANION GAP SERPL CALC-SCNC: 14 MMOL/L
AST SERPL-CCNC: 101 U/L
BASOPHILS # BLD AUTO: 0.07 K/UL
BASOPHILS NFR BLD: 0.6 %
BILIRUB SERPL-MCNC: 2 MG/DL
BNP SERPL-MCNC: 129 PG/ML
BUN SERPL-MCNC: 9 MG/DL
CALCIUM SERPL-MCNC: 9.7 MG/DL
CHLORIDE SERPL-SCNC: 91 MMOL/L
CO2 SERPL-SCNC: 20 MMOL/L
CREAT SERPL-MCNC: 1.2 MG/DL
D DIMER PPP IA.FEU-MCNC: 0.42 MG/L FEU
DIFFERENTIAL METHOD: ABNORMAL
EOSINOPHIL # BLD AUTO: 0.1 K/UL
EOSINOPHIL NFR BLD: 0.4 %
ERYTHROCYTE [DISTWIDTH] IN BLOOD BY AUTOMATED COUNT: 12.5 %
EST. GFR  (AFRICAN AMERICAN): >60 ML/MIN/1.73 M^2
EST. GFR  (NON AFRICAN AMERICAN): >60 ML/MIN/1.73 M^2
GLUCOSE SERPL-MCNC: 166 MG/DL
HCT VFR BLD AUTO: 39.6 %
HGB BLD-MCNC: 13.9 G/DL
IMM GRANULOCYTES # BLD AUTO: 0.04 K/UL
IMM GRANULOCYTES NFR BLD AUTO: 0.4 %
LYMPHOCYTES # BLD AUTO: 1.8 K/UL
LYMPHOCYTES NFR BLD: 16.5 %
MCH RBC QN AUTO: 33.2 PG
MCHC RBC AUTO-ENTMCNC: 35.1 G/DL
MCV RBC AUTO: 95 FL
MONOCYTES # BLD AUTO: 0.7 K/UL
MONOCYTES NFR BLD: 6.6 %
NEUTROPHILS # BLD AUTO: 8.4 K/UL
NEUTROPHILS NFR BLD: 75.5 %
NRBC BLD-RTO: 0 /100 WBC
PLATELET # BLD AUTO: 162 K/UL
PMV BLD AUTO: 9.1 FL
POTASSIUM SERPL-SCNC: 3.6 MMOL/L
PROT SERPL-MCNC: 7.9 G/DL
RBC # BLD AUTO: 4.19 M/UL
SODIUM SERPL-SCNC: 125 MMOL/L
TROPONIN I SERPL DL<=0.01 NG/ML-MCNC: <0.006 NG/ML
WBC # BLD AUTO: 11.16 K/UL

## 2018-09-25 PROCEDURE — 83880 ASSAY OF NATRIURETIC PEPTIDE: CPT

## 2018-09-25 PROCEDURE — 80053 COMPREHEN METABOLIC PANEL: CPT

## 2018-09-25 PROCEDURE — 99284 EMERGENCY DEPT VISIT MOD MDM: CPT | Mod: 25

## 2018-09-25 PROCEDURE — 94640 AIRWAY INHALATION TREATMENT: CPT

## 2018-09-25 PROCEDURE — 25000242 PHARM REV CODE 250 ALT 637 W/ HCPCS

## 2018-09-25 PROCEDURE — 85025 COMPLETE CBC W/AUTO DIFF WBC: CPT

## 2018-09-25 PROCEDURE — 84484 ASSAY OF TROPONIN QUANT: CPT

## 2018-09-25 PROCEDURE — 93010 ELECTROCARDIOGRAM REPORT: CPT | Mod: ,,, | Performed by: INTERNAL MEDICINE

## 2018-09-25 PROCEDURE — 85379 FIBRIN DEGRADATION QUANT: CPT

## 2018-09-25 PROCEDURE — 25000003 PHARM REV CODE 250

## 2018-09-25 PROCEDURE — 99285 EMERGENCY DEPT VISIT HI MDM: CPT | Mod: ,,,

## 2018-09-25 PROCEDURE — 63600175 PHARM REV CODE 636 W HCPCS

## 2018-09-25 PROCEDURE — 96374 THER/PROPH/DIAG INJ IV PUSH: CPT

## 2018-09-25 PROCEDURE — 93005 ELECTROCARDIOGRAM TRACING: CPT

## 2018-09-25 RX ORDER — FUROSEMIDE 10 MG/ML
80 INJECTION INTRAMUSCULAR; INTRAVENOUS
Status: COMPLETED | OUTPATIENT
Start: 2018-09-25 | End: 2018-09-25

## 2018-09-25 RX ORDER — LORAZEPAM 1 MG/1
1 TABLET ORAL 3 TIMES DAILY
Qty: 30 TABLET | Refills: 0 | Status: SHIPPED | OUTPATIENT
Start: 2018-09-25 | End: 2018-10-12

## 2018-09-25 RX ORDER — ASPIRIN 325 MG
325 TABLET, DELAYED RELEASE (ENTERIC COATED) ORAL
Status: COMPLETED | OUTPATIENT
Start: 2018-09-25 | End: 2018-09-25

## 2018-09-25 RX ORDER — ALBUTEROL SULFATE 0.83 MG/ML
2.5 SOLUTION RESPIRATORY (INHALATION) ONCE
Status: COMPLETED | OUTPATIENT
Start: 2018-09-25 | End: 2018-09-25

## 2018-09-25 RX ADMIN — ALBUTEROL SULFATE 2.5 MG: 2.5 SOLUTION RESPIRATORY (INHALATION) at 05:09

## 2018-09-25 RX ADMIN — ASPIRIN 325 MG: 325 TABLET, DELAYED RELEASE ORAL at 03:09

## 2018-09-25 RX ADMIN — FUROSEMIDE 80 MG: 10 INJECTION, SOLUTION INTRAMUSCULAR; INTRAVENOUS at 03:09

## 2018-09-25 NOTE — TELEPHONE ENCOUNTER
----- Message from Jaylin Wetzel sent at 9/25/2018  9:01 AM CDT -----  Contact: self/[phone]  Pt called in regards to getting a Rx refill for Lorazepam (ATIVAN) 1 MG tablet 30 tablet 0 9/4/2018 No TAKE 1 TABLET BY MOUTH THREE TIMES DAILY    [facility] [phone]  Please advise

## 2018-09-25 NOTE — TELEPHONE ENCOUNTER
----- Message from Wendy Johnson sent at 9/25/2018 12:03 PM CDT -----  Contact: Chani Pt's Daughter [phone]  Pt's daughter is calling to speak with someone in regards to some detox options for her dad. And she states that they were in the e.r last night due to the pt being short of breath. She also wanted to know what are to do if he was to become short of breath again. Please call back and advise.        Thanks

## 2018-09-25 NOTE — ED PROVIDER NOTES
Encounter Date: 9/25/2018    SCRIBE #1 NOTE: I, Natalie Almendarez, am scribing for, and in the presence of,  Dr. Galindo . I have scribed the entire note.       History     Chief Complaint   Patient presents with    Shortness of Breath      x days - cannot lie flat anymore - wheezing noted in triage - htn noted today - daughter reports that he took his break thought htn meds and then began to  drink - hx of afib , alcohol abuse      Time patient was seen by the provider: 3:00 AM      The patient is a 63 y.o. male with co-morbidities including: A-fib. Alcohol abuse, DM, HLD, who presents to the ED with a complaint of shortness of breath worse when laying flat and when ambulating. Patient denies any chest pain or weight gain. Denies history of heart failure. Per patient's daughter, he has been binge drinking this past weekend and was found to be hypertensive today. Patient took ativan along with a beer and 2 Metoprolol for blood pressure today.        The history is provided by the patient, a relative and medical records.     Review of patient's allergies indicates:   Allergen Reactions    No known drug allergies      Past Medical History:   Diagnosis Date    A-fib     Alcohol abuse     Arthritis     Chronic gout     Diabetes mellitus     DM (diabetes mellitus) 11/16/2016    Fatty liver     Hyperlipidemia     Renal cell cancer 2014     Past Surgical History:   Procedure Laterality Date    ABSCESS DRAINAGE      perirectal    COLONOSCOPY      FISTULOTOMY N/A 5/19/2015    [facility]    HAND SURGERY Left     HEMORRHOIDECTOMY N/A 5/19/2015    [facility]    PARTIAL NEPHRECTOMY Left August 2014    ROBOTIC ASSISTED LAPAROSCOPIC NEPHRECTOMY PARTIAL Left 8/28/2014    Performed by [facility] at [facility] OR [facility]     Family History   Problem Relation Age of Onset    Lung cancer Father     Cancer Father      Diabetes Mother     Lung cancer Sister     Colon polyps Brother     Cirrhosis Neg Hx      Social History     Tobacco Use    Smoking status: Never Smoker    Smokeless tobacco: Never Used   Substance Use Topics    Alcohol use: Yes     Alcohol/week: 4.2 oz     Types: 7 Cans of beer per week     Comment: patient states he drinks less than he use to    Drug use: No     Review of Systems   Respiratory: Positive for shortness of breath.    Cardiovascular: Positive for palpitations. Negative for chest pain.   All other systems reviewed and are negative.      Physical Exam     Initial Vitals [09/25/18 0150]   BP Pulse Resp Temp SpO2   139/82 (!) 56 20 -- 99 %      MAP       --         Physical Exam    Nursing note and vitals reviewed.  Constitutional: He appears well-developed.   HENT:   Head: Normocephalic and atraumatic.   Eyes: EOM are normal.   Neck: Normal range of motion. Neck supple.   Cardiovascular: Normal rate and regular rhythm.   Pulmonary/Chest: No respiratory distress. He has wheezes (mild at the bases).   Abdominal: Soft. There is no tenderness.   Musculoskeletal: Normal range of motion.   No acute deformity. Mild bilateral pedal edema.   Neurological: He is alert and oriented to person, place, and time.   Skin: Skin is warm and dry.   Psychiatric: He has a normal mood and affect.         ED Course   Procedures  Labs Reviewed   CBC W/ AUTO DIFFERENTIAL - Abnormal; Notable for the following components:       Result Value    RBC 4.19 (*)     Hemoglobin 13.9 (*)     Hematocrit 39.6 (*)     MCH 33.2 (*)     MPV 9.1 (*)     Gran # (ANC) 8.4 (*)     Gran% 75.5 (*)     Lymph% 16.5 (*)     All other components within normal limits   COMPREHENSIVE METABOLIC PANEL - Abnormal; Notable for the following components:    Sodium 125 (*)     Chloride 91 (*)     CO2 20 (*)     Glucose 166 (*)     Total Bilirubin 2.0 (*)     Alkaline Phosphatase 53 (*)      (*)     All other components within normal limits   B-TYPE  "NATRIURETIC PEPTIDE - Abnormal; Notable for the following components:     (*)     All other components within normal limits   TROPONIN I   D DIMER, QUANTITATIVE   D DIMER, QUANTITATIVE    Narrative:     ADD ON D DIMER PER DR NELLIE COLEMAN/ORDER# [id number] @ 6:00AM   9/25/18     EKG Readings: (Independently Interpreted)   Initial Reading: No STEMI. Rhythm: Sinus Bradycardia. Heart Rate: 63. Conduction: 1st Degree AV Block.   Prolonged QTC at 510. No acute ischemic changes. No ST or T wave changes.  Abnormal EKG.       Imaging Results          X-Ray Chest AP Portable (Final result)  Result time 09/25/18 03:30:26    Final result by Mikael Sommer MD (09/25/18 03:30:26)                 Impression:      No acute abnormality identified on this single view.      Electronically signed by: Mikael Sommer MD  Date:    09/25/2018  Time:    03:30             Narrative:    EXAMINATION:  XR CHEST AP PORTABLE    CLINICAL HISTORY:  Provided history is "  Chest pain, unspecified".    TECHNIQUE:  One view of the chest.    COMPARISON:  01/09/2018.    FINDINGS:  Multiple cardiac wires overlie the chest.  Cardiac silhouette is magnified by technique but not felt to be enlarged.  There is no large focal consolidation, sizeable pleural effusion, or pneumothorax.                                 Medical Decision Making:   History:   Old Medical Records: I decided to obtain old medical records.  Initial Assessment:   Patient appears to have had an episode of atrial fibrillation which she treated home with oral metoprolol prior to coming to the emergency department.  Clinical presentation concerning for congestive heart failure.  Independently Interpreted Test(s):   I have ordered and independently interpreted EKG Reading(s) - see prior notes  Clinical Tests:   Lab Tests: Ordered and Reviewed  Radiological Study: Ordered and Reviewed  Medical Tests: Ordered and Reviewed  ED Management:  Patient is much more comfortable after " diuresis.  Chest x-ray and labs do not support diagnosis of CHF.  Sodium level noted, consistent with beer potomania which is consistent with patient's report of drinking at home.  Discussed case with Cardiology who recommended discharge and follow-up in clinic in 1 week.              Scribe Attestation:   Scribe #1: I performed the above scribed service and the documentation accurately describes the services I performed. I attest to the accuracy of the note.               Clinical Impression:   The primary encounter diagnosis was Paroxysmal atrial fibrillation. Diagnoses of Shortness of breath, Chest pain, and Alcohol abuse were also pertinent to this visit.         I, Erik Galindo, personally performed the services described in this documentation. All medical record entries made by the scribe were at my direction and in my presence.  I have reviewed the chart and agree that the record reflects my personal performance and is accurate and complete within the limitations of emergency medical charting. Erik Galindo MD  09/25/18 0629

## 2018-09-25 NOTE — TELEPHONE ENCOUNTER
Spoke with pt's daughter, she stated that the pt was seen in ER for SOB and alcoholism. Pt's daughter stated that information they were given on detox the Physician's are not accepting new pts. Daughter would like to know what can you recommend to help pt get help for Detox. ER f/u scheduled.    Please advise,  Thank You.

## 2018-09-26 NOTE — TELEPHONE ENCOUNTER
Spoke with pt's daughter and informed Psych but she has already contacted and awaiting return call.Pt's daughter stated that he was in the ER for SOB and was given a breathing treatment and would like you to review ER notes and possibly prescribe that medication for the pt to have in case he has another breathing issue.    Please advise,  Thank You.

## 2018-09-28 RX ORDER — LORAZEPAM 1 MG/1
TABLET ORAL
Qty: 30 TABLET | Refills: 0 | OUTPATIENT
Start: 2018-09-28

## 2018-09-28 NOTE — TELEPHONE ENCOUNTER
Pt's daughter is waiting on Psych to return call about other Rehab's because the initial Companies they received were not accepting new pts.

## 2018-10-03 ENCOUNTER — LAB VISIT (OUTPATIENT)
Dept: LAB | Facility: HOSPITAL | Age: 63
End: 2018-10-03
Payer: COMMERCIAL

## 2018-10-03 ENCOUNTER — OFFICE VISIT (OUTPATIENT)
Dept: CARDIOLOGY | Facility: CLINIC | Age: 63
End: 2018-10-03
Payer: COMMERCIAL

## 2018-10-03 VITALS
HEART RATE: 62 BPM | WEIGHT: 185.63 LBS | DIASTOLIC BLOOD PRESSURE: 64 MMHG | OXYGEN SATURATION: 97 % | SYSTOLIC BLOOD PRESSURE: 125 MMHG | HEIGHT: 66 IN | BODY MASS INDEX: 29.83 KG/M2

## 2018-10-03 DIAGNOSIS — R06.00 DYSPNEA, UNSPECIFIED TYPE: ICD-10-CM

## 2018-10-03 DIAGNOSIS — R06.00 DYSPNEA, UNSPECIFIED TYPE: Primary | ICD-10-CM

## 2018-10-03 LAB
ALBUMIN SERPL BCP-MCNC: 4.3 G/DL
ALP SERPL-CCNC: 50 U/L
ALT SERPL W/O P-5'-P-CCNC: 25 U/L
ANION GAP SERPL CALC-SCNC: 10 MMOL/L
AST SERPL-CCNC: 44 U/L
BILIRUB SERPL-MCNC: 0.5 MG/DL
BUN SERPL-MCNC: 11 MG/DL
CALCIUM SERPL-MCNC: 10.3 MG/DL
CHLORIDE SERPL-SCNC: 103 MMOL/L
CO2 SERPL-SCNC: 27 MMOL/L
CREAT SERPL-MCNC: 1 MG/DL
EST. GFR  (AFRICAN AMERICAN): >60 ML/MIN/1.73 M^2
EST. GFR  (NON AFRICAN AMERICAN): >60 ML/MIN/1.73 M^2
GLUCOSE SERPL-MCNC: 157 MG/DL
POTASSIUM SERPL-SCNC: 4.2 MMOL/L
PROT SERPL-MCNC: 7.9 G/DL
SODIUM SERPL-SCNC: 140 MMOL/L
TSH SERPL DL<=0.005 MIU/L-ACNC: 1.31 UIU/ML

## 2018-10-03 PROCEDURE — 99214 OFFICE O/P EST MOD 30 MIN: CPT | Mod: S$GLB,,, | Performed by: STUDENT IN AN ORGANIZED HEALTH CARE EDUCATION/TRAINING PROGRAM

## 2018-10-03 PROCEDURE — 80053 COMPREHEN METABOLIC PANEL: CPT

## 2018-10-03 PROCEDURE — 99999 PR PBB SHADOW E&M-EST. PATIENT-LVL IV: CPT | Mod: PBBFAC,,, | Performed by: STUDENT IN AN ORGANIZED HEALTH CARE EDUCATION/TRAINING PROGRAM

## 2018-10-03 PROCEDURE — 36415 COLL VENOUS BLD VENIPUNCTURE: CPT

## 2018-10-03 PROCEDURE — 3078F DIAST BP <80 MM HG: CPT | Mod: CPTII,S$GLB,, | Performed by: STUDENT IN AN ORGANIZED HEALTH CARE EDUCATION/TRAINING PROGRAM

## 2018-10-03 PROCEDURE — 3074F SYST BP LT 130 MM HG: CPT | Mod: CPTII,S$GLB,, | Performed by: STUDENT IN AN ORGANIZED HEALTH CARE EDUCATION/TRAINING PROGRAM

## 2018-10-03 PROCEDURE — 3008F BODY MASS INDEX DOCD: CPT | Mod: CPTII,S$GLB,, | Performed by: STUDENT IN AN ORGANIZED HEALTH CARE EDUCATION/TRAINING PROGRAM

## 2018-10-03 PROCEDURE — 84443 ASSAY THYROID STIM HORMONE: CPT

## 2018-10-03 RX ORDER — ROSUVASTATIN CALCIUM 20 MG/1
20 TABLET, COATED ORAL DAILY
Qty: 90 TABLET | Refills: 3 | Status: SHIPPED | OUTPATIENT
Start: 2018-10-03 | End: 2019-03-04

## 2018-10-03 NOTE — PROGRESS NOTES
Cardiology Clinic Note  Reason for Visit: Dyspnea     HPI:   62 y/o M here after recent discharge from ED due to dyspnea. Seen in ED 9/25, was dyspneic for 2 weeks prior to the ED visit, initially he became dyspneic with usual activity such as mowing his yard, progressively worsened to dyspnea at rest, denies orthopnea, PND, chest discomfort, LE edema. States in the ED he received lasix IV and felt better and went home and felt significantly better, and back to baseline.  BNp was 129, CXR was clear at the ED visit. He was noted to be hyponatremic on CMP during the ED visit, has a hx of binge drinking (beer) states that since 9/25 he has quit alcohol.    Denies chest discomfort, dyspnea, dyspnea on exertion. States that he knows when he is in AF and feels terribly with AF, currently with regular rhythm.     ROS:    Review of Systems   Constitution: Negative.   HENT: Negative.    Eyes: Negative.    Cardiovascular: Negative.    Respiratory: Negative.    Endocrine: Negative.    Gastrointestinal: Negative.    Genitourinary: Negative.    All other systems reviewed and are negative.    PMH:     Past Medical History:   Diagnosis Date    A-fib     Alcohol abuse     Arthritis     Chronic gout     Diabetes mellitus     DM (diabetes mellitus) 11/16/2016    Fatty liver     Hyperlipidemia     Renal cell cancer 2014     Past Surgical History:   Procedure Laterality Date    ABSCESS DRAINAGE      perirectal    COLONOSCOPY      FISTULOTOMY N/A 5/19/2015    [facility]    HAND SURGERY Left     HEMORRHOIDECTOMY N/A 5/19/2015    Per[facility] by [facility] at [facility] OR [facility]    PARTIAL NEPHRECTOMY Left August 2014    ROBOTIC ASSISTED LAPAROSCOPIC NEPHRECTOMY PARTIAL Left 8/28/2014    Performed by Fabian Estevez [facility] at [facility] OR [address] [place] [address]     Allergies:     Review of patient's allergies indicates:   Allergen Reactions    No known drug allergies      Medications:  "    Current Outpatient Medications on File Prior to Visit   Medication Sig Dispense Refill    allopurinol (ZYLOPRIM) 100 MG tablet take 2 tablets by mouth once daily 60 tablet 11    apixaban 5 mg Tab Take 1 tablet (5 mg total) by mouth 2 (two) times daily. 60 tablet 11    fenofibrate 160 MG Tab Take 1 tablet (160 mg total) by mouth every evening. 90 tablet 0    LORazepam (ATIVAN) 1 MG tablet Take 1 tablet (1 mg total) by mouth 3 (three) times daily. 30 tablet 0    metoprolol succinate (TOPROL-XL) 25 MG 24 hr tablet Take 1 tablet (25 mg total) by mouth once daily. 30 tablet 11    omega-3 acid ethyl esters (LOVAZA) 1 gram capsule Take 2 g by mouth 2 (two) times daily.      ranitidine (ZANTAC) 150 MG capsule Take 150 mg by mouth 2 (two) times daily.      [DISCONTINUED] rosuvastatin (CRESTOR) 10 MG tablet Take 1 tablet (10 mg total) by mouth once daily. 30 tablet 11    aspirin 81 MG Chew Take 1 tablet (81 mg total) by mouth once daily. 30 tablet 11    flecainide (TAMBOCOR) 100 MG Tab Take 1 tablet (100 mg total) by mouth 2 (two) times daily. 60 tablet 11     No current facility-administered medications on file prior to visit.      Social History:     Social History     Tobacco Use    Smoking status: Never Smoker    Smokeless tobacco: Never Used   Substance Use Topics    Alcohol use: Yes     Alcohol/week: 4.2 oz     Types: 7 Cans of beer per week     Comment: patient states he drinks less than he use to     Family History:     Family History   Problem Relation Age of Onset    Lung cancer Father     Cancer Father     Diabetes Mother     Lung cancer Sister     Colon polyps Brother     Cirrhosis Neg Hx      Physical Exam:   /64 (BP Location: Left arm, Patient Position: Sitting, BP Method: Large (Automatic))   Pulse 62   Ht 5' 6" (1.676 m)   Wt 84.2 kg (185 lb 10 oz)   SpO2 97%   BMI 29.96 kg/m²      Physical Exam   Constitutional: He is oriented to person, place, and time.   HENT:   Head: " Normocephalic and atraumatic.   Eyes: No scleral icterus.   Neck: No JVD present.   Cardiovascular: Normal rate, regular rhythm and normal heart sounds.   No murmur heard.  Pulmonary/Chest: Effort normal and breath sounds normal. No respiratory distress. He has no wheezes. He has no rales.   Musculoskeletal: He exhibits no edema.   Neurological: He is alert and oriented to person, place, and time.   Skin: Skin is warm.   Psychiatric: He has a normal mood and affect.       Labs:     Lab Results   Component Value Date     (L) 09/25/2018    K 3.6 09/25/2018    CL 91 (L) 09/25/2018    CO2 20 (L) 09/25/2018    BUN 9 09/25/2018    CREATININE 1.2 09/25/2018    ANIONGAP 14 09/25/2018     Lab Results   Component Value Date    HGBA1C 7.2 (H) 07/28/2018     Lab Results   Component Value Date     (H) 09/25/2018    BNP 52 05/22/2014    Lab Results   Component Value Date    WBC 11.16 09/25/2018    HGB 13.9 (L) 09/25/2018    HCT 39.6 (L) 09/25/2018     09/25/2018    GRAN 8.4 (H) 09/25/2018    GRAN 75.5 (H) 09/25/2018     Lab Results   Component Value Date    CHOL 158 01/09/2018    HDL 35 (L) 01/09/2018    LDLCALC 101.2 01/09/2018    TRIG 109 01/09/2018          Imaging:         EF   Date Value Ref Range Status   12/27/2017 55 55 - 65    07/02/2014 60     04/16/2013 65           Assessment:      1. Dyspnea, unspecified type      64 y/o M with unexplained progressive dyspnea leading to ED visit last week, now back to baseline. BNP was 129, unclear etiology, dimer was normal. He has PAF, and significant ETOH hx so non-ischemic cardiomyopathy is in the differential.     Will get a echo, repeat CMP, TSH, his ASCVD risk is 19% he requires high intensity statin. He has LFT abnormalities will recheck LFTs in 6 weeks, c/w fenofibrate if he experiences myalgias will decrease crestor.  C/w eliquis, beta blocker and flecanide.    Plan:   Kleber was seen today for atrial fibrillation, shortness of breath and chest  pain.    Diagnoses and all orders for this visit:    Dyspnea, unspecified type  -     COMPREHENSIVE METABOLIC PANEL; Future  -     2D Echo w/ Color Flow Doppler; Future  -     HEPATIC FUNCTION PANEL; Future  -     TSH; Future    Other orders  -     rosuvastatin (CRESTOR) 20 MG tablet; Take 1 tablet (20 mg total) by mouth once daily.      -RTC in 3 months   -Rest of the plan as above      Signed:  Margarito Duvall DO  Cardiology Fellow

## 2018-10-12 ENCOUNTER — OFFICE VISIT (OUTPATIENT)
Dept: INTERNAL MEDICINE | Facility: CLINIC | Age: 63
End: 2018-10-12
Payer: COMMERCIAL

## 2018-10-12 ENCOUNTER — IMMUNIZATION (OUTPATIENT)
Dept: INTERNAL MEDICINE | Facility: CLINIC | Age: 63
End: 2018-10-12
Payer: COMMERCIAL

## 2018-10-12 ENCOUNTER — HOSPITAL ENCOUNTER (OUTPATIENT)
Dept: CARDIOLOGY | Facility: CLINIC | Age: 63
Discharge: HOME | End: 2018-10-12
Attending: STUDENT IN AN ORGANIZED HEALTH CARE EDUCATION/TRAINING PROGRAM
Payer: COMMERCIAL

## 2018-10-12 VITALS
OXYGEN SATURATION: 97 % | SYSTOLIC BLOOD PRESSURE: 112 MMHG | HEIGHT: 66 IN | DIASTOLIC BLOOD PRESSURE: 68 MMHG | BODY MASS INDEX: 29.62 KG/M2 | TEMPERATURE: 98 F | WEIGHT: 184.31 LBS | HEART RATE: 59 BPM

## 2018-10-12 DIAGNOSIS — Z79.4 TYPE 2 DIABETES MELLITUS WITHOUT COMPLICATION, WITH LONG-TERM CURRENT USE OF INSULIN: ICD-10-CM

## 2018-10-12 DIAGNOSIS — F41.9 ANXIETY: Primary | ICD-10-CM

## 2018-10-12 DIAGNOSIS — I48.91 ATRIAL FIBRILLATION, UNSPECIFIED TYPE: ICD-10-CM

## 2018-10-12 DIAGNOSIS — F10.10 ALCOHOL ABUSE: ICD-10-CM

## 2018-10-12 DIAGNOSIS — R06.00 DYSPNEA, UNSPECIFIED TYPE: ICD-10-CM

## 2018-10-12 DIAGNOSIS — E78.5 HYPERLIPIDEMIA, UNSPECIFIED HYPERLIPIDEMIA TYPE: ICD-10-CM

## 2018-10-12 DIAGNOSIS — E11.9 TYPE 2 DIABETES MELLITUS WITHOUT COMPLICATION, WITH LONG-TERM CURRENT USE OF INSULIN: ICD-10-CM

## 2018-10-12 LAB
ESTIMATED PA SYSTOLIC PRESSURE: 23.81
RETIRED EF AND QEF - SEE NOTES: 65 (ref 55–65)
TRICUSPID VALVE REGURGITATION: NORMAL

## 2018-10-12 PROCEDURE — 90471 IMMUNIZATION ADMIN: CPT | Mod: S$GLB,,, | Performed by: INTERNAL MEDICINE

## 2018-10-12 PROCEDURE — 3045F PR MOST RECENT HEMOGLOBIN A1C LEVEL 7.0-9.0%: CPT | Mod: CPTII,S$GLB,, | Performed by: INTERNAL MEDICINE

## 2018-10-12 PROCEDURE — 99999 PR PBB SHADOW E&M-EST. PATIENT-LVL IV: CPT | Mod: PBBFAC,,, | Performed by: INTERNAL MEDICINE

## 2018-10-12 PROCEDURE — 99214 OFFICE O/P EST MOD 30 MIN: CPT | Mod: S$GLB,,, | Performed by: INTERNAL MEDICINE

## 2018-10-12 PROCEDURE — 90686 IIV4 VACC NO PRSV 0.5 ML IM: CPT | Mod: S$GLB,,, | Performed by: INTERNAL MEDICINE

## 2018-10-12 PROCEDURE — 3008F BODY MASS INDEX DOCD: CPT | Mod: CPTII,S$GLB,, | Performed by: INTERNAL MEDICINE

## 2018-10-12 PROCEDURE — 3078F DIAST BP <80 MM HG: CPT | Mod: CPTII,S$GLB,, | Performed by: INTERNAL MEDICINE

## 2018-10-12 PROCEDURE — 3074F SYST BP LT 130 MM HG: CPT | Mod: CPTII,S$GLB,, | Performed by: INTERNAL MEDICINE

## 2018-10-12 PROCEDURE — 93306 TTE W/DOPPLER COMPLETE: CPT | Mod: S$GLB,,, | Performed by: INTERNAL MEDICINE

## 2018-10-12 RX ORDER — LORAZEPAM 0.5 MG/1
0.5 TABLET ORAL EVERY 12 HOURS PRN
Qty: 60 TABLET | Refills: 0 | Status: SHIPPED | OUTPATIENT
Start: 2018-10-12 | End: 2018-12-12 | Stop reason: SDUPTHER

## 2018-10-12 NOTE — PROGRESS NOTES
CHIEF COMPLAINT:  Hospital followup.    HISTORY OF PRESENT ILLNESS:  The patient is a 63-year-old gentleman who was   recently admitted for his CHF exacerbation.  The patient was also noted to have   increased his alcohol consumption.  He was drinking about a case of beer per   day.  He comes in today as a followup with his daughter.  The patient would like   something for anxiety.  He states that after his release from the Emergency   Department he stopped drinking for 10 days straight.  He went back because the   anxiety was too intense.  He is not drinking anywhere near what he used to.  He   is now drinking about one to two six packs a day.    REVIEW OF SYSTEMS:  The patient has no other voiced complaints currently except   the anxiety.    PHYSICAL EXAMINATION:  GENERAL APPEARANCE:  No acute distress.  HEENT:  Conjunctivae are clear.  Pupils are equal.  TMs were clear.  Nasal   septum is midline without discharge.  Oropharynx is without erythema.  NECK:  Trachea was midline without JVD.  PULMONARY:  Good inspiratory and expiratory breath sounds are heard.  Lungs are   clear to auscultation.  CARDIOVASCULAR:  S1, S2.  EXTREMITIES:  Without edema.  ABDOMEN:  Nontender, nondistended, without hepatosplenomegaly.    COMMENTS:  90% of the time today was spent discussing with the patient and his   daughter about his drinking.  We cannot start an SSRI since the patient is on   flecainide.    ASSESSMENT:  1.  Anxiety.  2.  Alcohol abuse.  3.  AFib.  4.  Hypertension.  5.  Hyperlipidemia.  6.  Type 2 diabetes.    PLAN:  We will check a CMP, A1c today.  We will research what medications we can   use for anxiety.  We will change his lorazepam from 1 mg t.i.d. to 0.5 mg   b.i.d.  He is to follow up in one month.  We will contact his daughter on   initiating a new medication for anxiety.  She can be reached at [phone].    The patient gave permission for me to contact her to discuss his treatment plan.      ORLANDO/DEJON  dd:  10/12/2018 14:15:55 (CDT)  td: 10/13/2018 06:20:54 (MANISHAT)  Doc ID   #[id number]  Job ID #[id number]    CC:

## 2018-10-16 RX ORDER — DULOXETIN HYDROCHLORIDE 20 MG/1
20 CAPSULE, DELAYED RELEASE ORAL DAILY
Qty: 30 CAPSULE | Refills: 11 | Status: SHIPPED | OUTPATIENT
Start: 2018-10-16 | End: 2019-01-17

## 2018-10-17 ENCOUNTER — TELEPHONE (OUTPATIENT)
Dept: INTERNAL MEDICINE | Facility: CLINIC | Age: 63
End: 2018-10-17

## 2018-10-17 NOTE — TELEPHONE ENCOUNTER
----- Message from Bacilio Larry MD sent at 10/16/2018  5:06 PM CDT -----  Please call the patient regarding his abnormal result. Please contact patient's daughter ( Brenda Abadie ) at [phone]. Notify that I called in a prescription for Cymbalta to [facility]. He is to take it once a day.

## 2018-10-29 RX ORDER — FENOFIBRATE 160 MG/1
160 TABLET ORAL NIGHTLY
Qty: 90 TABLET | Refills: 3 | Status: SHIPPED | OUTPATIENT
Start: 2018-10-29 | End: 2019-11-04

## 2018-10-29 RX ORDER — FENOFIBRATE 160 MG/1
TABLET ORAL
Qty: 90 TABLET | Refills: 0 | Status: CANCELLED | OUTPATIENT
Start: 2018-10-29

## 2018-10-29 NOTE — TELEPHONE ENCOUNTER
----- Message from Cindy Tapia sent at 10/29/2018  2:22 PM CDT -----  Contact: [phone]  Type: Rx    Name of medication(s):  fenofibrate 160 MG Tab    Is this a refill? New rx?  refill  Who prescribed medication?    Pharmacy Name, Phone, & Location:[facility] [address] [place] [address] [place]  AT [facility] [place] [facility] [address]    Comments: Please advise, thank you

## 2018-11-09 ENCOUNTER — TELEPHONE (OUTPATIENT)
Dept: INTERNAL MEDICINE | Facility: CLINIC | Age: 63
End: 2018-11-09

## 2018-11-15 ENCOUNTER — LAB VISIT (OUTPATIENT)
Dept: LAB | Facility: HOSPITAL | Age: 63
End: 2018-11-15
Payer: COMMERCIAL

## 2018-11-15 DIAGNOSIS — R06.00 DYSPNEA, UNSPECIFIED TYPE: ICD-10-CM

## 2018-11-15 LAB
ALBUMIN SERPL BCP-MCNC: 4.3 G/DL
ALP SERPL-CCNC: 52 U/L
ALT SERPL W/O P-5'-P-CCNC: 26 U/L
AST SERPL-CCNC: 31 U/L
BILIRUB DIRECT SERPL-MCNC: 0.3 MG/DL
BILIRUB SERPL-MCNC: 0.6 MG/DL
PROT SERPL-MCNC: 7.6 G/DL

## 2018-11-15 PROCEDURE — 80076 HEPATIC FUNCTION PANEL: CPT

## 2018-11-15 PROCEDURE — 36415 COLL VENOUS BLD VENIPUNCTURE: CPT

## 2018-12-12 DIAGNOSIS — F41.9 ANXIETY: ICD-10-CM

## 2018-12-12 RX ORDER — LORAZEPAM 0.5 MG/1
TABLET ORAL
Qty: 60 TABLET | Refills: 0 | Status: SHIPPED | OUTPATIENT
Start: 2018-12-12 | End: 2019-01-23 | Stop reason: SDUPTHER

## 2019-01-08 RX ORDER — METOPROLOL TARTRATE 25 MG/1
TABLET, FILM COATED ORAL
Qty: 180 TABLET | Refills: 0 | OUTPATIENT
Start: 2019-01-08

## 2019-01-08 RX ORDER — ALLOPURINOL 100 MG/1
TABLET ORAL
Qty: 60 TABLET | Refills: 0 | Status: SHIPPED | OUTPATIENT
Start: 2019-01-08 | End: 2019-02-22 | Stop reason: SDUPTHER

## 2019-01-14 ENCOUNTER — TELEPHONE (OUTPATIENT)
Dept: INTERNAL MEDICINE | Facility: CLINIC | Age: 64
End: 2019-01-14

## 2019-01-14 NOTE — TELEPHONE ENCOUNTER
----- Message from Bacilio Larry MD sent at 1/13/2019  4:23 AM CST -----  Please schedule a follow up appointment with me.

## 2019-01-16 DIAGNOSIS — E78.5 HYPERLIPIDEMIA, UNSPECIFIED HYPERLIPIDEMIA TYPE: Primary | ICD-10-CM

## 2019-01-17 ENCOUNTER — OFFICE VISIT (OUTPATIENT)
Dept: INTERNAL MEDICINE | Facility: CLINIC | Age: 64
End: 2019-01-17
Payer: COMMERCIAL

## 2019-01-17 ENCOUNTER — LAB VISIT (OUTPATIENT)
Dept: LAB | Facility: HOSPITAL | Age: 64
End: 2019-01-17
Attending: INTERNAL MEDICINE
Payer: COMMERCIAL

## 2019-01-17 VITALS
SYSTOLIC BLOOD PRESSURE: 120 MMHG | OXYGEN SATURATION: 99 % | DIASTOLIC BLOOD PRESSURE: 70 MMHG | HEART RATE: 60 BPM | WEIGHT: 185.88 LBS | HEIGHT: 66 IN | BODY MASS INDEX: 29.87 KG/M2 | TEMPERATURE: 98 F

## 2019-01-17 DIAGNOSIS — F10.10 ALCOHOL ABUSE: ICD-10-CM

## 2019-01-17 DIAGNOSIS — Z79.4 TYPE 2 DIABETES MELLITUS WITHOUT COMPLICATION, WITH LONG-TERM CURRENT USE OF INSULIN: ICD-10-CM

## 2019-01-17 DIAGNOSIS — E78.5 HYPERLIPIDEMIA, UNSPECIFIED HYPERLIPIDEMIA TYPE: ICD-10-CM

## 2019-01-17 DIAGNOSIS — I48.91 ATRIAL FIBRILLATION, UNSPECIFIED TYPE: ICD-10-CM

## 2019-01-17 DIAGNOSIS — F41.9 ANXIETY: ICD-10-CM

## 2019-01-17 DIAGNOSIS — F40.240 CLAUSTROPHOBIA: ICD-10-CM

## 2019-01-17 DIAGNOSIS — F41.9 ANXIETY: Primary | ICD-10-CM

## 2019-01-17 DIAGNOSIS — E11.9 TYPE 2 DIABETES MELLITUS WITHOUT COMPLICATION, WITH LONG-TERM CURRENT USE OF INSULIN: ICD-10-CM

## 2019-01-17 LAB
ALBUMIN SERPL BCP-MCNC: 4.2 G/DL
ALBUMIN/CREAT UR: 39.3 UG/MG
ALP SERPL-CCNC: 68 U/L
ALT SERPL W/O P-5'-P-CCNC: 47 U/L
ANION GAP SERPL CALC-SCNC: 11 MMOL/L
AST SERPL-CCNC: 118 U/L
BASOPHILS # BLD AUTO: 0.06 K/UL
BASOPHILS NFR BLD: 0.8 %
BILIRUB SERPL-MCNC: 1.4 MG/DL
BUN SERPL-MCNC: 6 MG/DL
CALCIUM SERPL-MCNC: 10 MG/DL
CHLORIDE SERPL-SCNC: 96 MMOL/L
CHOLEST SERPL-MCNC: 97 MG/DL
CHOLEST/HDLC SERPL: 3.6 {RATIO}
CO2 SERPL-SCNC: 26 MMOL/L
CREAT SERPL-MCNC: 1.3 MG/DL
CREAT UR-MCNC: 28 MG/DL
DIFFERENTIAL METHOD: ABNORMAL
EOSINOPHIL # BLD AUTO: 0.2 K/UL
EOSINOPHIL NFR BLD: 2.1 %
ERYTHROCYTE [DISTWIDTH] IN BLOOD BY AUTOMATED COUNT: 13.3 %
EST. GFR  (AFRICAN AMERICAN): >60 ML/MIN/1.73 M^2
EST. GFR  (NON AFRICAN AMERICAN): 58 ML/MIN/1.73 M^2
ESTIMATED AVG GLUCOSE: 177 MG/DL
GGT SERPL-CCNC: 811 U/L
GLUCOSE SERPL-MCNC: 251 MG/DL
HBA1C MFR BLD HPLC: 7.8 %
HCT VFR BLD AUTO: 41.9 %
HDLC SERPL-MCNC: 27 MG/DL
HDLC SERPL: 27.8 %
HGB BLD-MCNC: 14.3 G/DL
LDLC SERPL CALC-MCNC: 11.2 MG/DL
LYMPHOCYTES # BLD AUTO: 2 K/UL
LYMPHOCYTES NFR BLD: 28.5 %
MCH RBC QN AUTO: 32.4 PG
MCHC RBC AUTO-ENTMCNC: 34.1 G/DL
MCV RBC AUTO: 95 FL
MICROALBUMIN UR DL<=1MG/L-MCNC: 11 UG/ML
MONOCYTES # BLD AUTO: 1 K/UL
MONOCYTES NFR BLD: 14.3 %
NEUTROPHILS # BLD AUTO: 3.9 K/UL
NEUTROPHILS NFR BLD: 54 %
NONHDLC SERPL-MCNC: 70 MG/DL
PLATELET # BLD AUTO: 130 K/UL
PMV BLD AUTO: 9.4 FL
POTASSIUM SERPL-SCNC: 3.5 MMOL/L
PROT SERPL-MCNC: 7.4 G/DL
RBC # BLD AUTO: 4.41 M/UL
SODIUM SERPL-SCNC: 133 MMOL/L
TRIGL SERPL-MCNC: 294 MG/DL
TSH SERPL DL<=0.005 MIU/L-ACNC: 2.93 UIU/ML
WBC # BLD AUTO: 7.13 K/UL

## 2019-01-17 PROCEDURE — 3008F BODY MASS INDEX DOCD: CPT | Mod: CPTII,S$GLB,, | Performed by: INTERNAL MEDICINE

## 2019-01-17 PROCEDURE — 3078F PR MOST RECENT DIASTOLIC BLOOD PRESSURE < 80 MM HG: ICD-10-PCS | Mod: CPTII,S$GLB,, | Performed by: INTERNAL MEDICINE

## 2019-01-17 PROCEDURE — 84443 ASSAY THYROID STIM HORMONE: CPT

## 2019-01-17 PROCEDURE — 85025 COMPLETE CBC W/AUTO DIFF WBC: CPT

## 2019-01-17 PROCEDURE — 82977 ASSAY OF GGT: CPT

## 2019-01-17 PROCEDURE — 83036 HEMOGLOBIN GLYCOSYLATED A1C: CPT

## 2019-01-17 PROCEDURE — 82043 UR ALBUMIN QUANTITATIVE: CPT

## 2019-01-17 PROCEDURE — 3074F SYST BP LT 130 MM HG: CPT | Mod: CPTII,S$GLB,, | Performed by: INTERNAL MEDICINE

## 2019-01-17 PROCEDURE — 3044F HG A1C LEVEL LT 7.0%: CPT | Mod: CPTII,S$GLB,, | Performed by: INTERNAL MEDICINE

## 2019-01-17 PROCEDURE — 80053 COMPREHEN METABOLIC PANEL: CPT

## 2019-01-17 PROCEDURE — 99999 PR PBB SHADOW E&M-EST. PATIENT-LVL IV: ICD-10-PCS | Mod: PBBFAC,,, | Performed by: INTERNAL MEDICINE

## 2019-01-17 PROCEDURE — 99214 OFFICE O/P EST MOD 30 MIN: CPT | Mod: S$GLB,,, | Performed by: INTERNAL MEDICINE

## 2019-01-17 PROCEDURE — 3078F DIAST BP <80 MM HG: CPT | Mod: CPTII,S$GLB,, | Performed by: INTERNAL MEDICINE

## 2019-01-17 PROCEDURE — 3008F PR BODY MASS INDEX (BMI) DOCUMENTED: ICD-10-PCS | Mod: CPTII,S$GLB,, | Performed by: INTERNAL MEDICINE

## 2019-01-17 PROCEDURE — 99214 PR OFFICE/OUTPT VISIT, EST, LEVL IV, 30-39 MIN: ICD-10-PCS | Mod: S$GLB,,, | Performed by: INTERNAL MEDICINE

## 2019-01-17 PROCEDURE — 36415 COLL VENOUS BLD VENIPUNCTURE: CPT

## 2019-01-17 PROCEDURE — 3044F PR MOST RECENT HEMOGLOBIN A1C LEVEL <7.0%: ICD-10-PCS | Mod: CPTII,S$GLB,, | Performed by: INTERNAL MEDICINE

## 2019-01-17 PROCEDURE — 80061 LIPID PANEL: CPT

## 2019-01-17 PROCEDURE — 3074F PR MOST RECENT SYSTOLIC BLOOD PRESSURE < 130 MM HG: ICD-10-PCS | Mod: CPTII,S$GLB,, | Performed by: INTERNAL MEDICINE

## 2019-01-17 PROCEDURE — 99999 PR PBB SHADOW E&M-EST. PATIENT-LVL IV: CPT | Mod: PBBFAC,,, | Performed by: INTERNAL MEDICINE

## 2019-01-17 RX ORDER — METOPROLOL TARTRATE 25 MG/1
TABLET, FILM COATED ORAL
Qty: 180 TABLET | Refills: 0 | OUTPATIENT
Start: 2019-01-17

## 2019-01-17 NOTE — PROGRESS NOTES
CHIEF COMPLAINT:  Followup.    HISTORY OF PRESENT ILLNESS:  The patient is a 63-year-old gentleman who we see   for AFib, hypertension, diabetes, fatty liver, anxiety as well as alcohol abuse,   who comes in today for followup.  On last visit, the patient was started on   duloxetine for anxiety.  The patient states that the first week he took it he   felt good.  On the second week, it made him feel funny to the point where he   stopped taking it.  He took it for a total length of time of maybe three weeks.    The more he took it, the worse he felt.  The patient would like to see somebody   in Psychiatry.  In regards to his alcohol use, he was drinking 12 to 24 beers   per day.  He has managed to cut it back to 6 to 12.  He is trying to get it down   to six or less.  He has been taking Benadryl to help him sleep at night.  He   still states that he has lots of anxiety.  It appears that the anxiety either   started or at least increased after he has retired from working at [employer].    REVIEW OF SYSTEMS:  The patient does report gaining weight.  He does check his   blood pressure numerous times throughout the day and it has always been good.    He does report that his legs jump at night.  He is claustrophobic.  He did   decrease the amount of alcohol he drinks per day.    PHYSICAL EXAMINATION:  GENERAL APPEARANCE:  No acute distress.  The patient does appear to be a little   bit anxious.  HEENT:  Conjunctivae are clear.  Pupils were equal.  He had no photophobia.  TMs   were clear.  Nasal septum is midline without discharge.  Oropharynx is without   erythema.  PULMONARY:  Good inspiratory and expiratory breath sounds are heard.  Lungs were   clear to auscultation.  CARDIOVASCULAR:  S1, S2.  EXTREMITIES:  With trace edema.  ABDOMEN:  Nontender, nondistended, without hepatosplenomegaly.    COMMENTS:  The patient's daughter did give him a list of several names of   psychiatrists that she would like him to see.  He  does not have a list with him.    He is agreeable to being seen.    ASSESSMENT:  1.  Anxiety.  2.  Non-insulin-dependent diabetes.  3.  Hyperlipidemia.  4.  AFib.  5.  Alcohol abuse.  6.  Claustrophobia.    PLAN:  I did discuss with the patient that I felt that alcohol definitely   contributes to his atrial fibrillation as well as his weight gain.  Concerns   about the effect it has on his blood sugars as well as his liver were discussed   with him.  We will put the patient in for a CMP, lipid panel, A1c, urine for   microalbumin and CBC today.  We will refer the patient to Psychiatry.  We will   discontinue the Cymbalta since it was not working for him.      ORLANDO/DEJON  dd: 01/17/2019 12:16:44 (CST)  td: 01/18/2019 01:31:56 (CST)  Doc ID   #[id number]  Job ID #[id number]    CC:

## 2019-01-18 ENCOUNTER — TELEPHONE (OUTPATIENT)
Dept: INTERNAL MEDICINE | Facility: CLINIC | Age: 64
End: 2019-01-18

## 2019-01-18 DIAGNOSIS — R74.01 ELEVATED TRANSAMINASE LEVEL: Primary | ICD-10-CM

## 2019-01-18 DIAGNOSIS — F10.10 ETOH ABUSE: ICD-10-CM

## 2019-01-18 DIAGNOSIS — R74.8 ELEVATED SERUM GGT LEVEL: ICD-10-CM

## 2019-01-18 NOTE — TELEPHONE ENCOUNTER
Pt returned call and was informed. Chioma can you find him a time slot with Dr Larry in 1 month? Thanks.

## 2019-01-18 NOTE — TELEPHONE ENCOUNTER
----- Message from Michelle Silva sent at 1/18/2019  1:42 PM CST -----  Contact: [facility] [phone]       ----- Message -----  From: Natali Cuellar  Sent: 1/18/2019   1:28 PM  To: Laron MANDEL Staff    Patient is returning a phone call.  Who left a message for the patient: Ladonna   Does patient know what this is regarding: A message from Ladonna    Comments:

## 2019-01-18 NOTE — TELEPHONE ENCOUNTER
----- Message from Bacilio Larry MD sent at 1/18/2019  6:59 AM CST -----  Please call the patient regarding his abnormal result. His liver enzymes are elevated. Also, his diabetes has gotten worse. He needs to decrease his alcohol intake . He needs to see Ms.Deanadiane in Hepatology. Referral is in. He needs to see me in one month.

## 2019-01-23 DIAGNOSIS — F41.9 ANXIETY: ICD-10-CM

## 2019-01-24 ENCOUNTER — DOCUMENTATION ONLY (OUTPATIENT)
Dept: TRANSPLANT | Facility: CLINIC | Age: 64
End: 2019-01-24

## 2019-01-24 ENCOUNTER — TELEPHONE (OUTPATIENT)
Dept: HEPATOLOGY | Facility: CLINIC | Age: 64
End: 2019-01-24

## 2019-01-24 DIAGNOSIS — I48.0 PAROXYSMAL ATRIAL FIBRILLATION: ICD-10-CM

## 2019-01-24 RX ORDER — FLECAINIDE ACETATE 100 MG/1
100 TABLET ORAL 2 TIMES DAILY
Qty: 60 TABLET | Refills: 11 | Status: SHIPPED | OUTPATIENT
Start: 2019-01-24 | End: 2019-03-04 | Stop reason: SDUPTHER

## 2019-01-24 NOTE — TELEPHONE ENCOUNTER
----- Message from Kimber Hilliard, RN sent at 1/24/2019 11:29 AM CST -----   HEIDY LARRY Provider: Heidy Larry MD  Diagnosis: Elevated transaminase level  Elevated serum GGT level  ETOH abuse      EP of Hepatology - Please schedule this patient in Hep Clinic  Thanks

## 2019-01-24 NOTE — LETTER
January 24, 2019    Donald Abadie  [address] [place] [address]      Dear Donald Abadie:    Your doctor has referred you to the [facility]. We are sending this letter to remind you to make an appointment with us to complete the referral process.     Please call us at [phone] to schedule an appointment. We look forward to seeing you soon.     If you received a call and have been scheduled, please disregard this letter.       Sincerely,        [facility]   [address] [place] [id number]  [phone]

## 2019-01-24 NOTE — NURSING
HEIDY LARRY Provider: Heidy Larry MD   Diagnosis: Elevated transaminase level  Elevated serum GGT level  ETOH abuse       Referral sent to Hepatology for scheduling

## 2019-01-25 RX ORDER — LORAZEPAM 0.5 MG/1
TABLET ORAL
Qty: 60 TABLET | Refills: 0 | Status: SHIPPED | OUTPATIENT
Start: 2019-01-25 | End: 2019-02-26 | Stop reason: SDUPTHER

## 2019-01-31 ENCOUNTER — OFFICE VISIT (OUTPATIENT)
Dept: HEPATOLOGY | Facility: CLINIC | Age: 64
End: 2019-01-31
Payer: COMMERCIAL

## 2019-01-31 VITALS
SYSTOLIC BLOOD PRESSURE: 170 MMHG | HEART RATE: 63 BPM | WEIGHT: 184.31 LBS | RESPIRATION RATE: 18 BRPM | OXYGEN SATURATION: 100 % | BODY MASS INDEX: 29.62 KG/M2 | DIASTOLIC BLOOD PRESSURE: 90 MMHG | TEMPERATURE: 99 F | HEIGHT: 66 IN

## 2019-01-31 DIAGNOSIS — E78.5 HYPERLIPIDEMIA, UNSPECIFIED HYPERLIPIDEMIA TYPE: ICD-10-CM

## 2019-01-31 DIAGNOSIS — K76.0 FATTY LIVER: ICD-10-CM

## 2019-01-31 DIAGNOSIS — E66.3 OVERWEIGHT (BMI 25.0-29.9): ICD-10-CM

## 2019-01-31 DIAGNOSIS — R74.8 ELEVATED LIVER ENZYMES: ICD-10-CM

## 2019-01-31 DIAGNOSIS — E78.1 HYPERTRIGLYCERIDEMIA: ICD-10-CM

## 2019-01-31 DIAGNOSIS — E11.9 DIABETES MELLITUS WITHOUT COMPLICATION: ICD-10-CM

## 2019-01-31 DIAGNOSIS — F10.10 ALCOHOL ABUSE: Primary | ICD-10-CM

## 2019-01-31 PROCEDURE — 99214 PR OFFICE/OUTPT VISIT, EST, LEVL IV, 30-39 MIN: ICD-10-PCS | Mod: S$GLB,,, | Performed by: NURSE PRACTITIONER

## 2019-01-31 PROCEDURE — 3080F DIAST BP >= 90 MM HG: CPT | Mod: CPTII,S$GLB,, | Performed by: NURSE PRACTITIONER

## 2019-01-31 PROCEDURE — 3008F BODY MASS INDEX DOCD: CPT | Mod: CPTII,S$GLB,, | Performed by: NURSE PRACTITIONER

## 2019-01-31 PROCEDURE — 3008F PR BODY MASS INDEX (BMI) DOCUMENTED: ICD-10-PCS | Mod: CPTII,S$GLB,, | Performed by: NURSE PRACTITIONER

## 2019-01-31 PROCEDURE — 3080F PR MOST RECENT DIASTOLIC BLOOD PRESSURE >= 90 MM HG: ICD-10-PCS | Mod: CPTII,S$GLB,, | Performed by: NURSE PRACTITIONER

## 2019-01-31 PROCEDURE — 3045F PR MOST RECENT HEMOGLOBIN A1C LEVEL 7.0-9.0%: CPT | Mod: CPTII,S$GLB,, | Performed by: NURSE PRACTITIONER

## 2019-01-31 PROCEDURE — 99214 OFFICE O/P EST MOD 30 MIN: CPT | Mod: S$GLB,,, | Performed by: NURSE PRACTITIONER

## 2019-01-31 PROCEDURE — 99999 PR PBB SHADOW E&M-EST. PATIENT-LVL V: ICD-10-PCS | Mod: PBBFAC,,, | Performed by: NURSE PRACTITIONER

## 2019-01-31 PROCEDURE — 3077F PR MOST RECENT SYSTOLIC BLOOD PRESSURE >= 140 MM HG: ICD-10-PCS | Mod: CPTII,S$GLB,, | Performed by: NURSE PRACTITIONER

## 2019-01-31 PROCEDURE — 3077F SYST BP >= 140 MM HG: CPT | Mod: CPTII,S$GLB,, | Performed by: NURSE PRACTITIONER

## 2019-01-31 PROCEDURE — 99999 PR PBB SHADOW E&M-EST. PATIENT-LVL V: CPT | Mod: PBBFAC,,, | Performed by: NURSE PRACTITIONER

## 2019-01-31 PROCEDURE — 3045F PR MOST RECENT HEMOGLOBIN A1C LEVEL 7.0-9.0%: ICD-10-PCS | Mod: CPTII,S$GLB,, | Performed by: NURSE PRACTITIONER

## 2019-01-31 NOTE — PROGRESS NOTES
[facility] Established Patient Visit    Reason for Visit:  F/u fatty liver, alcohol use    PCP: Bacilio Larry    HPI:  This is a 63 y.o. male here for f/u of elevated liver enzymes due to fatty liver and alcohol use/abuse. He was last seen 5/2017. Here with his daughter today. He has had mildly elevated transaminases with AST > ALT for yrs. Tbili nl intermittently elevated also. Alb nl. INR nl. Plts have been intermittently low but spleen nl on imaging. Fibroscan 11/2016 = F2. U/s 11/2016 showed enlarged liver at 18.3 cm with steatosis. No ascites or masses. Spleen 10.4 cm. No findings to suggest overt cirrhosis on workup to date. He is currently drinking 12-24 beers per day. Has been having a lot of anxiety. PCP referred him to psychiatry but he not made appt yet.    He is Crestor and fenofibrate for his cholesterol and TGL, worse TGL on recent labs. DM is also worse with Hgb A1C now 7.8. He is not on any medications yet for his DM.    He denies any symptoms of advanced chronic liver disease including jaundice, dark urine, hematemesis, melena, slowed mentation, abdominal distention.      He never completed MRI elastography d/t claustrophobia. Will restage with Fibroscan.      ROS:   GENERAL: Denies fever, chills, weight loss/gain, fatigue  HEENT: Denies headaches, dizziness, vision/hearing changes  CARDIOVASCULAR: Denies chest pain, (+) palpitations, no edema  RESPIRATORY: Denies dyspnea, cough  GI: Denies abdominal pain, rectal bleeding, (+) nausea, (+) vomiting. (+) diarrhea  : Denies dysuria, hematuria   SKIN: Denies rash, itching   NEURO: Denies confusion, memory loss, or mood changes  PSYCH: Denies depression, (+) anxiety  HEME/LYMPH: Denies easy bruising or bleeding      PMHX:  has a past medical history of A-fib, Alcohol abuse, Arthritis, Chronic gout, Diabetes mellitus, DM (diabetes mellitus) (11/16/2016), Fatty liver, Hyperlipidemia, and Renal cell cancer (2014).    PSHX:  has a past  "surgical history that includes Abscess drainage; Hand surgery (Left); Partial nephrectomy (Left, August 2014); and Colonoscopy.    The patient's social and family histories were reviewed by me and updated in the appropriate section of the electronic medical record.    Review of patient's allergies indicates:   Allergen Reactions    No known drug allergies        Current Outpatient Medications on File Prior to Visit   Medication Sig Dispense Refill    allopurinol (ZYLOPRIM) 100 MG tablet TAKE 2 TABLETS BY MOUTH DAILY 60 tablet 0    apixaban 5 mg Tab Take 1 tablet (5 mg total) by mouth 2 (two) times daily. 60 tablet 11    fenofibrate 160 MG Tab Take 1 tablet (160 mg total) by mouth every evening. 90 tablet 3    flecainide (TAMBOCOR) 100 MG Tab Take 1 tablet (100 mg total) by mouth 2 (two) times daily. 60 tablet 11    LORazepam (ATIVAN) 0.5 MG tablet TAKE 1 TABLET BY MOUTH EVERY 12 HOURS AS NEEDED FOR ANXIETY 60 tablet 0    metoprolol succinate (TOPROL-XL) 25 MG 24 hr tablet Take 1 tablet (25 mg total) by mouth once daily. 30 tablet 11    omega-3 acid ethyl esters (LOVAZA) 1 gram capsule Take 2 g by mouth 2 (two) times daily.      ranitidine (ZANTAC) 150 MG capsule Take 150 mg by mouth 2 (two) times daily.      rosuvastatin (CRESTOR) 20 MG tablet Take 1 tablet (20 mg total) by mouth once daily. 90 tablet 3    aspirin 81 MG Chew Take 1 tablet (81 mg total) by mouth once daily. 30 tablet 11     No current facility-administered medications on file prior to visit.         Objective Findings:    PHYSICAL EXAM:   Friendly White male, in no acute distress; alert and oriented to person, place and time  VITALS:   BP (!) 190/94 (BP Location: Right arm, Patient Position: Sitting)   Pulse 63   Temp 98.5 °F (36.9 °C)   Resp 18   Ht 5' 6" (1.676 m)   Wt 83.6 kg (184 lb 4.9 oz)   SpO2 100%   BMI 29.75 kg/m²   HENT: Normocephalic, without obvious abnormality. Oral mucosa pink and moist. Dentition good.  EYES: Sclerae " anicteric.   NECK: Supple.   CARDIOVASCULAR: Regular rate and rhythm. No murmurs.  RESPIRATORY: Normal respiratory effort. BBS CTA. No wheezes or crackles.  GI: Soft, non-tender, non-distended. (+) hepatomegaly. No masses palpable. No ascites.  EXTREMITIES:  No clubbing, cyanosis or edema.  SKIN: Warm and dry. No jaundice. No rashes noted to exposed skin. (+) telangectasias noted. No palmar erythema.  NEURO:  Normal gate. No asterixis.  PSYCH:  Memory intact. Thought and speech pattern appropriate. Behavior normal. No depression or anxiety noted.      Labs:  Lab Results   Component Value Date    WBC 7.13 01/17/2019    HGB 14.3 01/17/2019    HCT 41.9 01/17/2019     (L) 01/17/2019    CHOL 97 (L) 01/17/2019    TRIG 294 (H) 01/17/2019    HDL 27 (L) 01/17/2019     (L) 01/17/2019    K 3.5 01/17/2019    CREATININE 1.3 01/17/2019    ALT 47 (H) 01/17/2019     (H) 01/17/2019    ALKPHOS 68 01/17/2019    BILITOT 1.4 (H) 01/17/2019    ALBUMIN 4.2 01/17/2019    INR 1.1 11/16/2016       ASSESSMENT:  63 y.o. White male with:  1.  Elevated liver enzymes, due to fatty liver due to alcohol abuse and overweight, DM, and HLD  -- AST > ALT  -- nl alk phos and Tbili   -- imaging shows hepatomegaly with steatosis  -- ferritin elevated but suspect d/t alcohol and inflammation  2. Fatty liver, due to both alcohol and NAFLD likely since he has DM And HLD and is overweight  -- transaminases mildly elevated, AST > ALT  -- US shows hepatomegaly with steatosis  -- synthetic liver function nl  -- risk factors for fatty liver include obesity, DM, HLD, alcohol abuse  -- Fibroscan 11/2016 = F2  -- s/p Twinrix vaccines  3. Alcohol abuse  4. Elevated ferritin, d/t alcohol use and inflammation  -- normal serum iron and iron sat and HH DNA analysis negative for C282Y and H63D  5. Thrombocytopenia, intermittent  -- spleen nl at 10.4 cm on u/s 2016      EDUCATION:   We discussed the manifestations of fatty liver disease. At this time  there is no FDA approved therapy for NAFLD.   The patient and I discussed the importance of diet, exercise, and weight loss for management of NAFLD. Discussed stopping alcohol as well since alcohol is definitely contributing to this as well.     We discussed a low fat, low carb/low sugar, high fiber diet, and a goal of 30 minutes of exercise 5 times per week.   Pt is aware that fatty liver can progress to steatohepatitis and possibly to cirrhosis, putting one at increased risk for liver cancer, liver failure, and death.      Discussed need to minimize and abstain completely from alcohol as this is causing liver dysfunction. Discussed that alcohol abuse can cause cirrhosis. Pt understands that continued alcohol abuse can be detrimental to his liver. Discussed potential outcomes of cirrhosis, including liver cancer, liver failure, liver transplant, death.     Discussed implications of a cirrhosis diagnosis with HCC screenings and EGD and why it is important for us to determine if pt's have cirrhosis so they can be properly monitored for potential complications.      PLAN:  1. Abd u/s  2. Fibroscan  3. Recommend to minimize alcohol use and stop completely if possible  4. Weight loss, goal of 10-15 lbs  5. Low fat, low cholesterol, low carb, high fiber, high protein diet  6. Exercise, 5 days a week, 30 min a day  7. Recommend good control of cholesterol, blood pressure, blood sugar levels. Needs to start DM meds since DM worse  8. Encouraged him to make appt in psychiatry  9. Follow up in 1-2 weeks with scans same day     Thank you for allowing me to participate in the care of Kleber Warren Abadie Alicia C Debautte, NP-C

## 2019-02-07 ENCOUNTER — OFFICE VISIT (OUTPATIENT)
Dept: OPTOMETRY | Facility: CLINIC | Age: 64
End: 2019-02-07
Payer: COMMERCIAL

## 2019-02-07 DIAGNOSIS — H52.4 HYPEROPIA WITH PRESBYOPIA OF BOTH EYES: ICD-10-CM

## 2019-02-07 DIAGNOSIS — E11.9 DIABETES MELLITUS TYPE 2 WITHOUT RETINOPATHY: Primary | ICD-10-CM

## 2019-02-07 DIAGNOSIS — H25.13 NUCLEAR SCLEROSIS, BILATERAL: ICD-10-CM

## 2019-02-07 DIAGNOSIS — H52.03 HYPEROPIA WITH PRESBYOPIA OF BOTH EYES: ICD-10-CM

## 2019-02-07 PROCEDURE — 92015 PR REFRACTION: ICD-10-PCS | Mod: S$GLB,,, | Performed by: OPTOMETRIST

## 2019-02-07 PROCEDURE — 92004 COMPRE OPH EXAM NEW PT 1/>: CPT | Mod: S$GLB,,, | Performed by: OPTOMETRIST

## 2019-02-07 PROCEDURE — 99999 PR PBB SHADOW E&M-EST. PATIENT-LVL II: ICD-10-PCS | Mod: PBBFAC,,, | Performed by: OPTOMETRIST

## 2019-02-07 PROCEDURE — 92015 DETERMINE REFRACTIVE STATE: CPT | Mod: S$GLB,,, | Performed by: OPTOMETRIST

## 2019-02-07 PROCEDURE — 99999 PR PBB SHADOW E&M-EST. PATIENT-LVL II: CPT | Mod: PBBFAC,,, | Performed by: OPTOMETRIST

## 2019-02-07 PROCEDURE — 92004 PR EYE EXAM, NEW PATIENT,COMPREHESV: ICD-10-PCS | Mod: S$GLB,,, | Performed by: OPTOMETRIST

## 2019-02-07 NOTE — PROGRESS NOTES
HPI     Pt is in for Diabetic exam . Pt states that near va has been blurry   lately. Denies flashes/floaters /pain/irritation OU. Pt is not using any   drops at this time.     Last edited by Anahi Mera on 2/7/2019  1:33 PM. (History)            Assessment /Plan     For exam results, see Encounter Report.    Diabetes mellitus type 2 without retinopathy  -No retinopathy noted today.  Continued control with primary care physician and annual comprehensive eye exam.    Nuclear sclerosis, bilateral  -Educated patient on presence of cataracts at today's exam, monitor at annual dilated fundus exam. 5+ years surgical estimate.    Hyperopia with presbyopia of both eyes  Eyeglass Final Rx     Eyeglass Final Rx       Sphere Cylinder Axis Dist VA Add    Right +0.75 Sphere  20/40 +2.50    Left [place] +1.00 005 20/40 +2.50                  RTC 1 yr

## 2019-02-08 ENCOUNTER — TELEPHONE (OUTPATIENT)
Dept: INTERNAL MEDICINE | Facility: CLINIC | Age: 64
End: 2019-02-08

## 2019-02-08 NOTE — TELEPHONE ENCOUNTER
----- Message from Natali Cuellar sent at 2/8/2019  2:33 PM CST -----  Contact: Pt's daughter Chani Cell# [phone]  Patient's daughter Chani is calling in regards to wanting her dad to also have a kidney ultrasound when he come in for his liver ultra sound on next Wednesday please.

## 2019-02-13 ENCOUNTER — HOSPITAL ENCOUNTER (OUTPATIENT)
Dept: RADIOLOGY | Facility: HOSPITAL | Age: 64
Discharge: HOME | End: 2019-02-13
Attending: NURSE PRACTITIONER
Payer: COMMERCIAL

## 2019-02-13 DIAGNOSIS — K76.0 FATTY LIVER: ICD-10-CM

## 2019-02-13 PROCEDURE — 76700 US EXAM ABDOM COMPLETE: CPT | Mod: TC

## 2019-02-13 PROCEDURE — 76700 US ABDOMEN COMPLETE: ICD-10-PCS | Mod: 26,,, | Performed by: RADIOLOGY

## 2019-02-13 PROCEDURE — 76700 US EXAM ABDOM COMPLETE: CPT | Mod: 26,,, | Performed by: RADIOLOGY

## 2019-02-14 ENCOUNTER — LAB VISIT (OUTPATIENT)
Dept: LAB | Facility: HOSPITAL | Age: 64
End: 2019-02-14
Payer: COMMERCIAL

## 2019-02-14 ENCOUNTER — PROCEDURE VISIT (OUTPATIENT)
Dept: HEPATOLOGY | Facility: CLINIC | Age: 64
End: 2019-02-14
Attending: NURSE PRACTITIONER
Payer: COMMERCIAL

## 2019-02-14 ENCOUNTER — OFFICE VISIT (OUTPATIENT)
Dept: HEPATOLOGY | Facility: CLINIC | Age: 64
End: 2019-02-14
Payer: COMMERCIAL

## 2019-02-14 VITALS
DIASTOLIC BLOOD PRESSURE: 80 MMHG | SYSTOLIC BLOOD PRESSURE: 143 MMHG | RESPIRATION RATE: 16 BRPM | HEART RATE: 58 BPM | HEIGHT: 66 IN | WEIGHT: 182.13 LBS | OXYGEN SATURATION: 96 % | TEMPERATURE: 98 F | BODY MASS INDEX: 29.27 KG/M2

## 2019-02-14 DIAGNOSIS — R79.89 ELEVATED LFTS: ICD-10-CM

## 2019-02-14 DIAGNOSIS — N28.89 KIDNEY MASS: ICD-10-CM

## 2019-02-14 DIAGNOSIS — K76.0 FATTY LIVER: ICD-10-CM

## 2019-02-14 DIAGNOSIS — R74.8 ELEVATED LIVER ENZYMES: Primary | ICD-10-CM

## 2019-02-14 DIAGNOSIS — F10.10 ALCOHOL ABUSE: ICD-10-CM

## 2019-02-14 DIAGNOSIS — R74.8 ELEVATED LIVER ENZYMES: ICD-10-CM

## 2019-02-14 DIAGNOSIS — E66.3 OVERWEIGHT (BMI 25.0-29.9): ICD-10-CM

## 2019-02-14 DIAGNOSIS — Z85.528 HISTORY OF RENAL CARCINOMA: ICD-10-CM

## 2019-02-14 DIAGNOSIS — E11.9 DIABETES MELLITUS WITHOUT COMPLICATION: ICD-10-CM

## 2019-02-14 DIAGNOSIS — I48.0 PAROXYSMAL ATRIAL FIBRILLATION: ICD-10-CM

## 2019-02-14 DIAGNOSIS — C64.2 RENAL CELL CARCINOMA OF LEFT KIDNEY: ICD-10-CM

## 2019-02-14 LAB
ALBUMIN SERPL BCP-MCNC: 4 G/DL
ALP SERPL-CCNC: 70 U/L
ALT SERPL W/O P-5'-P-CCNC: 31 U/L
ANION GAP SERPL CALC-SCNC: 12 MMOL/L
AST SERPL-CCNC: 93 U/L
BASOPHILS # BLD AUTO: 0.08 K/UL
BASOPHILS NFR BLD: 1.4 %
BILIRUB SERPL-MCNC: 1.1 MG/DL
BUN SERPL-MCNC: 8 MG/DL
CALCIUM SERPL-MCNC: 9.3 MG/DL
CHLORIDE SERPL-SCNC: 100 MMOL/L
CO2 SERPL-SCNC: 23 MMOL/L
CREAT SERPL-MCNC: 1.2 MG/DL
DIFFERENTIAL METHOD: ABNORMAL
EOSINOPHIL # BLD AUTO: 0.2 K/UL
EOSINOPHIL NFR BLD: 2.6 %
ERYTHROCYTE [DISTWIDTH] IN BLOOD BY AUTOMATED COUNT: 12.8 %
EST. GFR  (AFRICAN AMERICAN): >60 ML/MIN/1.73 M^2
EST. GFR  (NON AFRICAN AMERICAN): >60 ML/MIN/1.73 M^2
GLUCOSE SERPL-MCNC: 190 MG/DL
HCT VFR BLD AUTO: 43.2 %
HGB BLD-MCNC: 14.8 G/DL
IMM GRANULOCYTES # BLD AUTO: 0.01 K/UL
IMM GRANULOCYTES NFR BLD AUTO: 0.2 %
INR PPP: 1.2
LYMPHOCYTES # BLD AUTO: 2.4 K/UL
LYMPHOCYTES NFR BLD: 42.5 %
MCH RBC QN AUTO: 33 PG
MCHC RBC AUTO-ENTMCNC: 34.3 G/DL
MCV RBC AUTO: 96 FL
MONOCYTES # BLD AUTO: 0.6 K/UL
MONOCYTES NFR BLD: 10 %
NEUTROPHILS # BLD AUTO: 2.5 K/UL
NEUTROPHILS NFR BLD: 43.3 %
NRBC BLD-RTO: 0 /100 WBC
PLATELET # BLD AUTO: 161 K/UL
PMV BLD AUTO: 9.2 FL
POTASSIUM SERPL-SCNC: 3.6 MMOL/L
PROT SERPL-MCNC: 7.6 G/DL
PROTHROMBIN TIME: 11.8 SEC
RBC # BLD AUTO: 4.48 M/UL
SODIUM SERPL-SCNC: 135 MMOL/L
WBC # BLD AUTO: 5.72 K/UL

## 2019-02-14 PROCEDURE — 85025 COMPLETE CBC W/AUTO DIFF WBC: CPT

## 2019-02-14 PROCEDURE — 99999 PR PBB SHADOW E&M-EST. PATIENT-LVL V: CPT | Mod: PBBFAC,,, | Performed by: NURSE PRACTITIONER

## 2019-02-14 PROCEDURE — 99999 PR PBB SHADOW E&M-EST. PATIENT-LVL V: ICD-10-PCS | Mod: PBBFAC,,, | Performed by: NURSE PRACTITIONER

## 2019-02-14 PROCEDURE — 3045F PR MOST RECENT HEMOGLOBIN A1C LEVEL 7.0-9.0%: CPT | Mod: CPTII,S$GLB,, | Performed by: NURSE PRACTITIONER

## 2019-02-14 PROCEDURE — 3077F PR MOST RECENT SYSTOLIC BLOOD PRESSURE >= 140 MM HG: ICD-10-PCS | Mod: CPTII,S$GLB,, | Performed by: NURSE PRACTITIONER

## 2019-02-14 PROCEDURE — 99214 PR OFFICE/OUTPT VISIT, EST, LEVL IV, 30-39 MIN: ICD-10-PCS | Mod: S$GLB,,, | Performed by: NURSE PRACTITIONER

## 2019-02-14 PROCEDURE — 99214 OFFICE O/P EST MOD 30 MIN: CPT | Mod: S$GLB,,, | Performed by: NURSE PRACTITIONER

## 2019-02-14 PROCEDURE — 91200 PR LIVER ELASTOGRAPHY W/OUT IMAG W/INTERP & REPORT: ICD-10-PCS | Mod: S$GLB,,, | Performed by: NURSE PRACTITIONER

## 2019-02-14 PROCEDURE — 3079F DIAST BP 80-89 MM HG: CPT | Mod: CPTII,S$GLB,, | Performed by: NURSE PRACTITIONER

## 2019-02-14 PROCEDURE — 80053 COMPREHEN METABOLIC PANEL: CPT

## 2019-02-14 PROCEDURE — 3077F SYST BP >= 140 MM HG: CPT | Mod: CPTII,S$GLB,, | Performed by: NURSE PRACTITIONER

## 2019-02-14 PROCEDURE — 3079F PR MOST RECENT DIASTOLIC BLOOD PRESSURE 80-89 MM HG: ICD-10-PCS | Mod: CPTII,S$GLB,, | Performed by: NURSE PRACTITIONER

## 2019-02-14 PROCEDURE — 91200 LIVER ELASTOGRAPHY: CPT | Mod: S$GLB,,, | Performed by: NURSE PRACTITIONER

## 2019-02-14 PROCEDURE — 3045F PR MOST RECENT HEMOGLOBIN A1C LEVEL 7.0-9.0%: ICD-10-PCS | Mod: CPTII,S$GLB,, | Performed by: NURSE PRACTITIONER

## 2019-02-14 PROCEDURE — 3008F BODY MASS INDEX DOCD: CPT | Mod: CPTII,S$GLB,, | Performed by: NURSE PRACTITIONER

## 2019-02-14 PROCEDURE — 36415 COLL VENOUS BLD VENIPUNCTURE: CPT

## 2019-02-14 PROCEDURE — 3008F PR BODY MASS INDEX (BMI) DOCUMENTED: ICD-10-PCS | Mod: CPTII,S$GLB,, | Performed by: NURSE PRACTITIONER

## 2019-02-14 PROCEDURE — 85610 PROTHROMBIN TIME: CPT

## 2019-02-14 NOTE — PROCEDURES
Fibroscan Procedure     Name: Donald Warren Abadie  Date of Procedure : 2019   :: Marlena Sibley NP  Diagnosis: Alcohol/fatty liver    Probe: XL    Fibroscan readin.8 KPa    Fibrosis:F3     CAP readin dB/m    Steatosis: :S3

## 2019-02-14 NOTE — PROGRESS NOTES
[facility] Established Patient Visit    Reason for Visit:  F/u fatty liver, alcohol use    PCP: Bacilio Larry    HPI:  This is a 63 y.o. male here for f/u of elevated liver enzymes due to fatty liver and alcohol use/abuse. Here with his daughter again today. He has had elevated transaminases with AST > ALT for yrs. Tbili nl intermittently elevated also. Alb nl. INR nl. Plts have been intermittently low but spleen nl on imaging. Fibroscan 11/2016 = F2. U/s 11/2016 showed enlarged liver at 18.3 cm with steatosis. No ascites or masses. Spleen 10.4 cm. No findings to suggest overt cirrhosis on workup to date. He is currently drinking 12-24 beers per day. Has been having a lot of anxiety. PCP referred him to psychiatry but he not made appt yet.    He does have withdrawal symptoms when not drinking. He has considered rehab as an option but doesn't want to do inpatient. He had repeat u/s and Fibroscan to reassess fibrosis. U/s yesterday showed fatty liver w/o masses, spleen nl 9.9 cm. Also notes was a possible small mass on right kidney. He has h/o RCC in left kidney. Will have him f/u with urology for this.     His fibroscan today = kPa 12.8, F3, CAP = 400, S3.      He is Crestor and fenofibrate for his cholesterol and TGL, worse TGL on recent labs. DM is also worse with Hgb A1C now 7.8. He is not on any medications yet for his DM.    He denies any symptoms of advanced chronic liver disease including jaundice, dark urine, hematemesis, melena, slowed mentation, abdominal distention.        ROS:   GENERAL: Denies fever, chills, weight loss/gain, fatigue  HEENT: Denies headaches, dizziness, vision/hearing changes  CARDIOVASCULAR: Denies chest pain, (+) palpitations, no edema  RESPIRATORY: Denies dyspnea, cough  GI: Denies abdominal pain, rectal bleeding, (+) nausea, (+) vomiting. (+) diarrhea  : Denies dysuria, hematuria   SKIN: Denies rash, itching   NEURO: Denies confusion, memory loss, or mood  changes  PSYCH: Denies depression, (+) anxiety  HEME/LYMPH: Denies easy bruising or bleeding      PMHX:  has a past medical history of A-fib, Alcohol abuse, Arthritis, Chronic gout, Diabetes mellitus, DM (diabetes mellitus) (11/16/2016), Fatty liver, Hyperlipidemia, and Renal cell cancer (2014).    PSHX:  has a past surgical history that includes Abscess drainage; Hand surgery (Left); Partial nephrectomy (Left, August 2014); and Colonoscopy.    The patient's social and family histories were reviewed by me and updated in the appropriate section of the electronic medical record.    Review of patient's allergies indicates:   Allergen Reactions    No known drug allergies        Current Outpatient Medications on File Prior to Visit   Medication Sig Dispense Refill    allopurinol (ZYLOPRIM) 100 MG tablet TAKE 2 TABLETS BY MOUTH DAILY 60 tablet 0    apixaban 5 mg Tab Take 1 tablet (5 mg total) by mouth 2 (two) times daily. 60 tablet 11    fenofibrate 160 MG Tab Take 1 tablet (160 mg total) by mouth every evening. 90 tablet 3    flecainide (TAMBOCOR) 100 MG Tab Take 1 tablet (100 mg total) by mouth 2 (two) times daily. 60 tablet 11    LORazepam (ATIVAN) 0.5 MG tablet TAKE 1 TABLET BY MOUTH EVERY 12 HOURS AS NEEDED FOR ANXIETY 60 tablet 0    metoprolol succinate (TOPROL-XL) 25 MG 24 hr tablet Take 1 tablet (25 mg total) by mouth once daily. 30 tablet 11    omega-3 acid ethyl esters (LOVAZA) 1 gram capsule Take 2 g by mouth 2 (two) times daily.      ranitidine (ZANTAC) 150 MG capsule Take 150 mg by mouth 2 (two) times daily.      rosuvastatin (CRESTOR) 20 MG tablet Take 1 tablet (20 mg total) by mouth once daily. 90 tablet 3    aspirin 81 MG Chew Take 1 tablet (81 mg total) by mouth once daily. 30 tablet 11     No current facility-administered medications on file prior to visit.         Objective Findings:    PHYSICAL EXAM:   Friendly White male, in no acute distress; alert and oriented to person, place and  "time  VITALS:   BP (!) 143/80 (BP Location: Right arm, Patient Position: Sitting)   Pulse (!) 58   Temp 98.2 °F (36.8 °C)   Resp 16   Ht 5' 6" (1.676 m)   Wt 82.6 kg (182 lb 1.6 oz)   SpO2 96%   BMI 29.39 kg/m²   HENT: Normocephalic, without obvious abnormality. Oral mucosa pink and moist. Dentition good.  EYES: Sclerae anicteric.   NECK: Supple.   CARDIOVASCULAR: Regular rate and rhythm. No murmurs.  RESPIRATORY: Normal respiratory effort. BBS CTA. No wheezes or crackles.  GI: Soft, non-tender, non-distended. (+) hepatomegaly. No masses palpable. No ascites.  EXTREMITIES:  No clubbing, cyanosis or edema.  SKIN: Warm and dry. No jaundice. No rashes noted to exposed skin. (+) telangectasias noted. No palmar erythema.  NEURO:  Normal gate. No asterixis.  PSYCH:  Memory intact. Thought and speech pattern appropriate. Behavior normal. No depression or anxiety noted.      Labs:  Lab Results   Component Value Date    WBC 7.13 01/17/2019    HGB 14.3 01/17/2019    HCT 41.9 01/17/2019     (L) 01/17/2019    CHOL 97 (L) 01/17/2019    TRIG 294 (H) 01/17/2019    HDL 27 (L) 01/17/2019     (L) 01/17/2019    K 3.5 01/17/2019    CREATININE 1.3 01/17/2019    ALT 47 (H) 01/17/2019     (H) 01/17/2019    ALKPHOS 68 01/17/2019    BILITOT 1.4 (H) 01/17/2019    ALBUMIN 4.2 01/17/2019    INR 1.1 11/16/2016       ASSESSMENT:  63 y.o. White male with:  1.  Elevated liver enzymes, due to fatty liver due to alcohol abuse and overweight, DM, and HLD  -- AST > ALT  -- nl alk phos and Tbili   -- imaging shows hepatomegaly with steatosis  -- ferritin elevated but suspect d/t alcohol and inflammation  2. Fatty liver, due to both alcohol and NAFLD likely since he has DM And HLD and is overweight  -- transaminases elevated, AST > ALT  -- US shows hepatomegaly with steatosis  -- synthetic liver function nl  -- risk factors for fatty liver include obesity, DM, HLD, alcohol abuse  -- Fibroscan 11/2016 = F2  -- Fibroscan today = " kPa 12.8, F3, CAP = 400, S3  -- s/p Twinrix vaccines  3. Alcohol abuse  4. Elevated ferritin, d/t alcohol use and inflammation  -- normal serum iron and iron sat and HH DNA analysis negative for C282Y and H63D  5. Thrombocytopenia, intermittent  -- spleen nl at 10.4 cm on u/s 2016  6. ? Kidney mass on u/s yesterday, has h/o RCC on left kidney  -- referral to urology        EDUCATION:   We discussed the manifestations of fatty liver disease. At this time there is no FDA approved therapy for NAFLD.   The patient and I discussed the importance of diet, exercise, and weight loss for management of NAFLD. Discussed stopping alcohol as well since alcohol is definitely contributing to this as well.     We discussed a low fat, low carb/low sugar, high fiber diet, and a goal of 30 minutes of exercise 5 times per week.   Pt is aware that fatty liver can progress to steatohepatitis and possibly to cirrhosis, putting one at increased risk for liver cancer, liver failure, and death.      Discussed need to minimize and abstain completely from alcohol as this is causing liver dysfunction. Discussed that alcohol abuse can cause cirrhosis. Pt understands that continued alcohol abuse can be detrimental to his liver. Discussed potential outcomes of cirrhosis, including liver cancer, liver failure, liver transplant, death.     Discussed liver biopsy procedure and possible complications associated with liver biopsy including pain, bleeding, infection, and organ perforation. Reviewed the role of the procedure including confirming of diagnosis and staging of liver disease so pt can be appropriately followed from this point forward.      PLAN:  1. Pt to undergo u/s guided liver biopsy to fully evaluate fibrosis stage  2. Labs today for pre-biopsy CBC, CMP, INR  3. Recommend to minimize alcohol use and stop completely if possible. Encouraged him to do rehab and see addictive psychiatry here  4. Weight loss, goal of 10-15 lbs  5. Low fat, low  cholesterol, low carb, high fiber, high protein diet  6. Exercise, 5 days a week, 30 min a day  7. Recommend good control of cholesterol, blood pressure, blood sugar levels. Needs to start DM meds since DM worse  8. Referral to urology  9. Follow up in 4 weeks     Thank you for allowing me to participate in the care of Kleber Schuster Abadie Alicia C Debautte, ZULEMA-C

## 2019-02-18 ENCOUNTER — OFFICE VISIT (OUTPATIENT)
Dept: UROLOGY | Facility: CLINIC | Age: 64
End: 2019-02-18
Payer: COMMERCIAL

## 2019-02-18 ENCOUNTER — OFFICE VISIT (OUTPATIENT)
Dept: INTERNAL MEDICINE | Facility: CLINIC | Age: 64
End: 2019-02-18
Payer: COMMERCIAL

## 2019-02-18 VITALS
HEIGHT: 66 IN | DIASTOLIC BLOOD PRESSURE: 70 MMHG | HEART RATE: 63 BPM | BODY MASS INDEX: 29.94 KG/M2 | SYSTOLIC BLOOD PRESSURE: 131 MMHG | WEIGHT: 186.31 LBS

## 2019-02-18 VITALS
DIASTOLIC BLOOD PRESSURE: 72 MMHG | HEART RATE: 63 BPM | SYSTOLIC BLOOD PRESSURE: 116 MMHG | BODY MASS INDEX: 30.04 KG/M2 | WEIGHT: 186.94 LBS | HEIGHT: 66 IN | OXYGEN SATURATION: 99 % | TEMPERATURE: 98 F

## 2019-02-18 DIAGNOSIS — Z85.528 HISTORY OF RENAL CELL CARCINOMA: Primary | ICD-10-CM

## 2019-02-18 DIAGNOSIS — F10.10 ETOH ABUSE: ICD-10-CM

## 2019-02-18 DIAGNOSIS — F41.9 ANXIETY: ICD-10-CM

## 2019-02-18 DIAGNOSIS — G89.29 CHRONIC PAIN OF RIGHT KNEE: Primary | ICD-10-CM

## 2019-02-18 DIAGNOSIS — M25.561 CHRONIC PAIN OF RIGHT KNEE: Primary | ICD-10-CM

## 2019-02-18 PROCEDURE — 99213 PR OFFICE/OUTPT VISIT, EST, LEVL III, 20-29 MIN: ICD-10-PCS | Mod: S$GLB,,, | Performed by: INTERNAL MEDICINE

## 2019-02-18 PROCEDURE — 99203 OFFICE O/P NEW LOW 30 MIN: CPT | Mod: S$GLB,,, | Performed by: UROLOGY

## 2019-02-18 PROCEDURE — 3008F BODY MASS INDEX DOCD: CPT | Mod: CPTII,S$GLB,, | Performed by: INTERNAL MEDICINE

## 2019-02-18 PROCEDURE — 3075F PR MOST RECENT SYSTOLIC BLOOD PRESS GE 130-139MM HG: ICD-10-PCS | Mod: CPTII,S$GLB,, | Performed by: UROLOGY

## 2019-02-18 PROCEDURE — 3075F SYST BP GE 130 - 139MM HG: CPT | Mod: CPTII,S$GLB,, | Performed by: UROLOGY

## 2019-02-18 PROCEDURE — 99999 PR PBB SHADOW E&M-EST. PATIENT-LVL III: ICD-10-PCS | Mod: PBBFAC,,, | Performed by: UROLOGY

## 2019-02-18 PROCEDURE — 3074F PR MOST RECENT SYSTOLIC BLOOD PRESSURE < 130 MM HG: ICD-10-PCS | Mod: CPTII,S$GLB,, | Performed by: INTERNAL MEDICINE

## 2019-02-18 PROCEDURE — 3078F PR MOST RECENT DIASTOLIC BLOOD PRESSURE < 80 MM HG: ICD-10-PCS | Mod: CPTII,S$GLB,, | Performed by: UROLOGY

## 2019-02-18 PROCEDURE — 3008F BODY MASS INDEX DOCD: CPT | Mod: CPTII,S$GLB,, | Performed by: UROLOGY

## 2019-02-18 PROCEDURE — 99999 PR PBB SHADOW E&M-EST. PATIENT-LVL IV: CPT | Mod: PBBFAC,,, | Performed by: INTERNAL MEDICINE

## 2019-02-18 PROCEDURE — 99213 OFFICE O/P EST LOW 20 MIN: CPT | Mod: S$GLB,,, | Performed by: INTERNAL MEDICINE

## 2019-02-18 PROCEDURE — 3008F PR BODY MASS INDEX (BMI) DOCUMENTED: ICD-10-PCS | Mod: CPTII,S$GLB,, | Performed by: INTERNAL MEDICINE

## 2019-02-18 PROCEDURE — 3008F PR BODY MASS INDEX (BMI) DOCUMENTED: ICD-10-PCS | Mod: CPTII,S$GLB,, | Performed by: UROLOGY

## 2019-02-18 PROCEDURE — 3078F PR MOST RECENT DIASTOLIC BLOOD PRESSURE < 80 MM HG: ICD-10-PCS | Mod: CPTII,S$GLB,, | Performed by: INTERNAL MEDICINE

## 2019-02-18 PROCEDURE — 3078F DIAST BP <80 MM HG: CPT | Mod: CPTII,S$GLB,, | Performed by: UROLOGY

## 2019-02-18 PROCEDURE — 99999 PR PBB SHADOW E&M-EST. PATIENT-LVL IV: ICD-10-PCS | Mod: PBBFAC,,, | Performed by: INTERNAL MEDICINE

## 2019-02-18 PROCEDURE — 99203 PR OFFICE/OUTPT VISIT, NEW, LEVL III, 30-44 MIN: ICD-10-PCS | Mod: S$GLB,,, | Performed by: UROLOGY

## 2019-02-18 PROCEDURE — 3074F SYST BP LT 130 MM HG: CPT | Mod: CPTII,S$GLB,, | Performed by: INTERNAL MEDICINE

## 2019-02-18 PROCEDURE — 3078F DIAST BP <80 MM HG: CPT | Mod: CPTII,S$GLB,, | Performed by: INTERNAL MEDICINE

## 2019-02-18 PROCEDURE — 99999 PR PBB SHADOW E&M-EST. PATIENT-LVL III: CPT | Mod: PBBFAC,,, | Performed by: UROLOGY

## 2019-02-18 RX ORDER — VENLAFAXINE 37.5 MG/1
37.5 TABLET ORAL 2 TIMES DAILY
Qty: 60 TABLET | Refills: 11 | Status: ON HOLD | OUTPATIENT
Start: 2019-02-18 | End: 2019-03-16

## 2019-02-18 NOTE — LETTER
February 18, 2019      Marlena Sibley, NP  [address] [facility] [address]           [facility]  [address]  [facility] [address]  Phone: [phone]          Patient: Donald Warren Abadie   MR Number: [id number]   YOB: 1955   Date of Visit: 2/18/2019       Dear Marlena Sibley:    Thank you for referring Donald Abadie to me for evaluation. Attached you will find relevant portions of my assessment and plan of care.    If you have questions, please do not hesitate to call me. I look forward to following Donald Abadie along with you.    Sincerely,    Ramon Raines MD    Enclosure  CC:  No Recipients    If you would like to receive this communication electronically, please contact [email or web address] or [phone] to request more information on [facility] access.    For providers and/or their staff who would like to refer a patient to [facility], please contact us through our one-stop-shop provider referral line, [facility] [place], at [phone].    If you feel you have received this communication in error or would no longer like to receive these types of communications, please e-mail [email or web address]

## 2019-02-18 NOTE — PROGRESS NOTES
CHIEF COMPLAINT:  Followup.    HISTORY OF PRESENT ILLNESS:  The patient is a 63-year-old gentleman who we see   for AFib, hypertension, diabetes, fatty liver as well as anxiety and alcohol   abuse, who comes in today for followup.  The patient is here today with his   daughter.  They did see Marlena Sibley in Hepatology.  They plan on doing a   liver biopsy.  The patient also went to see Psychiatry for outpatient rehab;   however, they told him because of his AFib, they would prefer him to be   inpatient.  The patient states he cannot be inpatient because he is   claustrophobic.    REVIEW OF SYSTEMS:  Significant just for anxiety.    PHYSICAL EXAMINATION:  Physical exam was not done.    The entire time was spent in discussion with the patient and his daughter about   his alcoholism, about his anxiety, the treatment and so on.  The patient did not   tolerate Cymbalta.  He had been on Lexapro in the past, but this was   discontinued since it would interfere with his flecainide.  Effexor was   discussed briefly.    ASSESSMENT:  1.  Anxiety.  2.  Alcohol abuse.  3.  Atrial fibrillation.    PLAN:  We will start the patient on Effexor 37.5 one p.o. b.i.d.  For the first   week, he is to take it just once a day, then increase to twice a day.  He was   also encouraged to talk with his son-in-law's grandfather who runs an AA clinic.    Also, discussed with him the need to get back into Psychiatry.  He is to   follow up with us in one month.    Total length of today's visit was 45 minutes.      NORY  dd: 02/18/2019 14:28:08 (CST)  td: 02/19/2019 09:22:40 (CST)  Doc ID   #[id number]  Job ID #[id number]    CC:

## 2019-02-18 NOTE — PROGRESS NOTES
Subjective:       Patient ID: Donald Warren Abadie is a 63 y.o. male.    Chief Complaint:  Renal Mass  Patient is a 63 y.o. male who is new to our clinic (previously seen by Dr. Delatorre and Dr. Olivera > 3 years ago) and referred by their PCP, Dr. Larry for evaluation of a history of renal cell carcinoma.       History of Present Illness  HPI  The patient had a history of renal cell carcinoma and underwent a left robotic partial nephrectomy in 2014.  He has not had study follow-up since that time.  However, he reports having an ultrasound recently which did not show any recurrent mass.  He denies any gross hematuria.  No unexpected weight loss or bone pain.    Lab Results   Component Value Date    PSA 3.8 08/09/2017    PSA 1.5 02/06/2015    PSA 1.92 04/04/2013    PSA 1.95 04/25/2012    PSA 1.8 01/12/2011    PSA 1.6 03/23/2010    PSA 2.0 03/09/2009    PSA 1.1 05/28/2007    PSADIAG 1.6 12/01/2017         Review of Systems  Review of Systems  All other systems reviewed and negative except pertinent positives noted in HPI.       Objective:     Physical Exam   Nursing note and vitals reviewed.  Constitutional: He is oriented to person, place, and time. Vital signs are normal. He appears well-developed and well-nourished. He is cooperative.   HENT:   Head: Normocephalic.   Neck: No tracheal deviation present.   Cardiovascular: Normal rate and intact distal pulses.    Pulmonary/Chest: Effort normal. No accessory muscle usage. No tachypnea. No respiratory distress.   Abdominal: Soft. Normal appearance. He exhibits no distension, no fluid wave, no ascites and no mass. There is no tenderness. There is no rebound and no CVA tenderness. No hernia.       Musculoskeletal: Normal range of motion.   Lymphadenopathy:        Right: No inguinal adenopathy present.        Left: No inguinal adenopathy present.   Neurological: He is alert and oriented to person, place, and time. He has normal strength.   Skin: No bruising and no rash  noted.     Psychiatric: He has a normal mood and affect. His speech is normal and behavior is normal. Thought content normal.       Lab Review  Lab Results   Component Value Date    COLORU Straw 01/09/2018    SPECGRAV 1.005 01/09/2018    PHUR 6.0 01/09/2018    NITRITE Negative 01/09/2018    KETONESU Negative 01/09/2018    UROBILINOGEN Negative 01/09/2018         Assessment:        1. History of renal cell carcinoma            Plan:     History of renal cell carcinoma  -     CT Abdomen With Without Contrast; Future; Expected date: 02/18/2019  -     X-Ray Chest PA And Lateral; Future; Expected date: 02/18/2019      -CT abdomen an chest x-ray for surveillance of renal cell carcinoma  -I will call the patient when the results are available  -follow-up in 1 year if these tests are normal

## 2019-02-19 ENCOUNTER — TELEPHONE (OUTPATIENT)
Dept: HEPATOLOGY | Facility: CLINIC | Age: 64
End: 2019-02-19

## 2019-02-20 NOTE — TELEPHONE ENCOUNTER
Liver Biopsy Instructions were placed in the mail on 2/19/19.  Liver Biopsy was already scheduled for for 2/28/19 at 7 am.

## 2019-02-22 ENCOUNTER — TELEPHONE (OUTPATIENT)
Dept: CARDIOLOGY | Facility: CLINIC | Age: 64
End: 2019-02-22

## 2019-02-22 ENCOUNTER — TELEPHONE (OUTPATIENT)
Dept: HEPATOLOGY | Facility: CLINIC | Age: 64
End: 2019-02-22

## 2019-02-22 DIAGNOSIS — I48.91 ATRIAL FIBRILLATION WITH RVR: ICD-10-CM

## 2019-02-22 DIAGNOSIS — I48.0 PAROXYSMAL ATRIAL FIBRILLATION: ICD-10-CM

## 2019-02-22 RX ORDER — ALLOPURINOL 100 MG/1
TABLET ORAL
Qty: 60 TABLET | Refills: 0 | Status: SHIPPED | OUTPATIENT
Start: 2019-02-22 | End: 2019-10-28 | Stop reason: SDUPTHER

## 2019-02-22 RX ORDER — METOPROLOL SUCCINATE 25 MG/1
TABLET, EXTENDED RELEASE ORAL
Qty: 30 TABLET | Refills: 0 | Status: CANCELLED | OUTPATIENT
Start: 2019-02-22

## 2019-02-22 NOTE — TELEPHONE ENCOUNTER
----- Message from Natalie Austin MD sent at 2/22/2019  2:22 PM CST -----  Regarding: ELIQUIS  It is appropriate to hold his Eliquis for 5 days and restart when the physician performing biopsy feels adequate hemostasis has been achieved. His CHADSVASC score is 1 and annual stroke risk is 0.3%.

## 2019-02-22 NOTE — TELEPHONE ENCOUNTER
----- Message from Whitney Dickerson sent at 2/22/2019 12:26 PM CST -----  Contact: Patient  The Pt needs a refill on his metoprolol succinate (TOPROL-XL) 25 MG 24 hr tablet to [facility], and he said he takes to different kinds. Thanks, Whitney  10/3/2018 Dr. Duvall

## 2019-02-23 NOTE — TELEPHONE ENCOUNTER
Left a  fallon the pt - mentioned in his message that he was taking two kinds of metoprolol. Informed pt in  that metoprolol tartarate was stopped by Dr. Lyles on 2-7-2018, and that he should be taking metoprolol succinate 25 mg daily.

## 2019-02-24 DIAGNOSIS — I48.91 ATRIAL FIBRILLATION WITH RVR: ICD-10-CM

## 2019-02-24 DIAGNOSIS — I48.0 PAROXYSMAL ATRIAL FIBRILLATION: ICD-10-CM

## 2019-02-24 RX ORDER — METOPROLOL SUCCINATE 25 MG/1
TABLET, EXTENDED RELEASE ORAL
Qty: 30 TABLET | Refills: 0 | OUTPATIENT
Start: 2019-02-24

## 2019-02-25 ENCOUNTER — TELEPHONE (OUTPATIENT)
Dept: CARDIOLOGY | Facility: CLINIC | Age: 64
End: 2019-02-25

## 2019-02-25 DIAGNOSIS — I48.0 PAROXYSMAL ATRIAL FIBRILLATION: Primary | ICD-10-CM

## 2019-02-25 RX ORDER — METOPROLOL SUCCINATE 25 MG/1
25 TABLET, EXTENDED RELEASE ORAL DAILY
Qty: 30 TABLET | Refills: 11 | Status: SHIPPED | OUTPATIENT
Start: 2019-02-25 | End: 2019-03-04

## 2019-02-25 NOTE — TELEPHONE ENCOUNTER
Pt said he would like to schedule an appt to see ,no appt's until April.Pt disappointed b/c he was with  but he graduated.Seems like he can not keep a doctor consistently.Offered pt an appt to see a staff Cardiologist and he agreed.Pt said he has canceled his appt's at least twice b/c he said he felt bad.Instructed the pt the more reason to keep his appt.He agreed and agreed to see  on 3/4/19 at 9:20 AM w/ an EKG b4 .Pt would like the Cardiologist to consider Rx'ing Lopressor 25 mg 1 tab BID as needed, along with the Metoprolol succinate (toprol XL) 25 mg 1 tab QD,which he said he did not always take.Pt reports understanding of these instructions.

## 2019-02-25 NOTE — TELEPHONE ENCOUNTER
----- Message from Es Patel sent at 2/25/2019  4:53 PM CST -----  Contact: pt   Pt calling in ref to a message Ila left for him on Friday in  Ref to his Metoprolol tartarate being stopped by Dr. Lyles.  LOV 10/30/18 Dr. Duvall    Thanks

## 2019-02-26 DIAGNOSIS — F41.9 ANXIETY: ICD-10-CM

## 2019-02-26 RX ORDER — LORAZEPAM 0.5 MG/1
TABLET ORAL
Qty: 60 TABLET | Refills: 0 | Status: ON HOLD | OUTPATIENT
Start: 2019-02-26 | End: 2019-03-29 | Stop reason: HOSPADM

## 2019-02-27 ENCOUNTER — TELEPHONE (OUTPATIENT)
Dept: INTERVENTIONAL RADIOLOGY/VASCULAR | Facility: HOSPITAL | Age: 64
End: 2019-02-27

## 2019-02-27 NOTE — TELEPHONE ENCOUNTER
Spoke to pt, pt still taking fish oil. Informed MIHAELA Qureshi MD, MD states pt needs to reschedule and stop fish oil x 5days. -HR

## 2019-02-28 ENCOUNTER — HOSPITAL ENCOUNTER (OUTPATIENT)
Dept: RADIOLOGY | Facility: HOSPITAL | Age: 64
Discharge: HOME | End: 2019-02-28
Attending: UROLOGY
Payer: COMMERCIAL

## 2019-02-28 DIAGNOSIS — Z85.528 HISTORY OF RENAL CELL CARCINOMA: ICD-10-CM

## 2019-02-28 PROCEDURE — 74170 CT ABD WO CNTRST FLWD CNTRST: CPT | Mod: 26,,, | Performed by: RADIOLOGY

## 2019-02-28 PROCEDURE — 71046 X-RAY EXAM CHEST 2 VIEWS: CPT | Mod: 26,,, | Performed by: RADIOLOGY

## 2019-02-28 PROCEDURE — 71046 XR CHEST PA AND LATERAL: ICD-10-PCS | Mod: 26,,, | Performed by: RADIOLOGY

## 2019-02-28 PROCEDURE — 25500020 PHARM REV CODE 255: Performed by: UROLOGY

## 2019-02-28 PROCEDURE — 71046 X-RAY EXAM CHEST 2 VIEWS: CPT | Mod: TC

## 2019-02-28 PROCEDURE — 74170 CT ABD WO CNTRST FLWD CNTRST: CPT | Mod: TC

## 2019-02-28 PROCEDURE — 74170 CT ABDOMEN W WO CONTRAST: ICD-10-PCS | Mod: 26,,, | Performed by: RADIOLOGY

## 2019-02-28 RX ADMIN — IOHEXOL 100 ML: 350 INJECTION, SOLUTION INTRAVENOUS at 05:02

## 2019-03-01 ENCOUNTER — TELEPHONE (OUTPATIENT)
Dept: UROLOGY | Facility: CLINIC | Age: 64
End: 2019-03-01

## 2019-03-01 NOTE — TELEPHONE ENCOUNTER
----- Message from Ramon Raines MD sent at 3/1/2019  7:04 AM CST -----  Please notify the patient that his chest x-ray was normal. Thank you.

## 2019-03-04 ENCOUNTER — TELEPHONE (OUTPATIENT)
Dept: INTERVENTIONAL RADIOLOGY/VASCULAR | Facility: HOSPITAL | Age: 64
End: 2019-03-04

## 2019-03-04 ENCOUNTER — OFFICE VISIT (OUTPATIENT)
Dept: CARDIOLOGY | Facility: CLINIC | Age: 64
End: 2019-03-04
Payer: COMMERCIAL

## 2019-03-04 ENCOUNTER — HOSPITAL ENCOUNTER (OUTPATIENT)
Dept: CARDIOLOGY | Facility: CLINIC | Age: 64
Discharge: HOME | End: 2019-03-04
Payer: COMMERCIAL

## 2019-03-04 VITALS
OXYGEN SATURATION: 100 % | HEIGHT: 66 IN | BODY MASS INDEX: 29.62 KG/M2 | DIASTOLIC BLOOD PRESSURE: 71 MMHG | HEART RATE: 70 BPM | WEIGHT: 184.31 LBS | SYSTOLIC BLOOD PRESSURE: 129 MMHG

## 2019-03-04 DIAGNOSIS — I48.0 PAROXYSMAL ATRIAL FIBRILLATION: ICD-10-CM

## 2019-03-04 DIAGNOSIS — E78.1 HYPERTRIGLYCERIDEMIA: ICD-10-CM

## 2019-03-04 DIAGNOSIS — R79.89 ELEVATED LFTS: ICD-10-CM

## 2019-03-04 DIAGNOSIS — E78.5 HYPERLIPIDEMIA: ICD-10-CM

## 2019-03-04 DIAGNOSIS — F10.10 ALCOHOL ABUSE: ICD-10-CM

## 2019-03-04 DIAGNOSIS — I48.0 PAROXYSMAL ATRIAL FIBRILLATION: Primary | ICD-10-CM

## 2019-03-04 PROCEDURE — 93000 ELECTROCARDIOGRAM COMPLETE: CPT | Mod: S$GLB,,, | Performed by: INTERNAL MEDICINE

## 2019-03-04 PROCEDURE — 3074F PR MOST RECENT SYSTOLIC BLOOD PRESSURE < 130 MM HG: ICD-10-PCS | Mod: CPTII,S$GLB,, | Performed by: INTERNAL MEDICINE

## 2019-03-04 PROCEDURE — 99214 OFFICE O/P EST MOD 30 MIN: CPT | Mod: S$GLB,,, | Performed by: INTERNAL MEDICINE

## 2019-03-04 PROCEDURE — 99999 PR PBB SHADOW E&M-EST. PATIENT-LVL IV: ICD-10-PCS | Mod: PBBFAC,,, | Performed by: INTERNAL MEDICINE

## 2019-03-04 PROCEDURE — 3078F PR MOST RECENT DIASTOLIC BLOOD PRESSURE < 80 MM HG: ICD-10-PCS | Mod: CPTII,S$GLB,, | Performed by: INTERNAL MEDICINE

## 2019-03-04 PROCEDURE — 3074F SYST BP LT 130 MM HG: CPT | Mod: CPTII,S$GLB,, | Performed by: INTERNAL MEDICINE

## 2019-03-04 PROCEDURE — 93000 EKG 12-LEAD: ICD-10-PCS | Mod: S$GLB,,, | Performed by: INTERNAL MEDICINE

## 2019-03-04 PROCEDURE — 99214 PR OFFICE/OUTPT VISIT, EST, LEVL IV, 30-39 MIN: ICD-10-PCS | Mod: S$GLB,,, | Performed by: INTERNAL MEDICINE

## 2019-03-04 PROCEDURE — 3008F PR BODY MASS INDEX (BMI) DOCUMENTED: ICD-10-PCS | Mod: CPTII,S$GLB,, | Performed by: INTERNAL MEDICINE

## 2019-03-04 PROCEDURE — 3078F DIAST BP <80 MM HG: CPT | Mod: CPTII,S$GLB,, | Performed by: INTERNAL MEDICINE

## 2019-03-04 PROCEDURE — 99999 PR PBB SHADOW E&M-EST. PATIENT-LVL IV: CPT | Mod: PBBFAC,,, | Performed by: INTERNAL MEDICINE

## 2019-03-04 PROCEDURE — 3008F BODY MASS INDEX DOCD: CPT | Mod: CPTII,S$GLB,, | Performed by: INTERNAL MEDICINE

## 2019-03-04 RX ORDER — METOPROLOL TARTRATE 25 MG/1
25 TABLET, FILM COATED ORAL 2 TIMES DAILY PRN
Qty: 60 TABLET | Refills: 3 | Status: ON HOLD | OUTPATIENT
Start: 2019-03-04 | End: 2019-03-29 | Stop reason: HOSPADM

## 2019-03-04 RX ORDER — FLECAINIDE ACETATE 100 MG/1
100 TABLET ORAL 2 TIMES DAILY
Qty: 60 TABLET | Refills: 11 | Status: SHIPPED | OUTPATIENT
Start: 2019-03-04 | End: 2019-03-07

## 2019-03-04 RX ORDER — METOPROLOL TARTRATE 25 MG/1
25 TABLET, FILM COATED ORAL 2 TIMES DAILY PRN
COMMUNITY
End: 2019-03-04 | Stop reason: SDUPTHER

## 2019-03-04 RX ORDER — NAPROXEN SODIUM 220 MG/1
81 TABLET, FILM COATED ORAL DAILY
Qty: 30 TABLET | Refills: 11 | Status: ON HOLD | OUTPATIENT
Start: 2019-03-04 | End: 2019-06-27 | Stop reason: HOSPADM

## 2019-03-04 RX ORDER — DILTIAZEM HYDROCHLORIDE 120 MG/1
120 CAPSULE, EXTENDED RELEASE ORAL DAILY
Qty: 30 CAPSULE | Refills: 11 | Status: SHIPPED | OUTPATIENT
Start: 2019-03-04 | End: 2019-04-02

## 2019-03-04 RX ORDER — ROSUVASTATIN CALCIUM 10 MG/1
10 TABLET, COATED ORAL DAILY
Qty: 90 TABLET | Refills: 3 | Status: SHIPPED | OUTPATIENT
Start: 2019-03-04 | End: 2019-08-02

## 2019-03-04 NOTE — Clinical Note
Switched him from metoprolol to dilt because he wasn't compliant with daily metoprolol.  Was thinking about increasing flecainide to 150 bid - wanted to run it by you.Uriel

## 2019-03-04 NOTE — TELEPHONE ENCOUNTER
Left VM, informed pt of apt time. NPO after MN, need to have ride home. PLEASE CONFIRM PT OFF ALL BLOOD THINNERS. Home meds reviewed. -HR

## 2019-03-04 NOTE — PROGRESS NOTES
Subjective:   Chief Complaint: Dyspnea, unspecified type (3 months fu)    Last Clinic Visit: 10/3/2018    History of Present Illness: Donald Warren Abadie is a 63 y.o. gentleman with paroxysmal atrial fibrillation, alcohol abuse, hypertension, hypertriglyceridemia, and elevated LFTs who presents to follow-up, was most recently seen this past fall by fellow.  He has seen several fellows, and wishes to establish with a staff cardiologist.     He has a several year history of paroxysmal atrial fibrillation, very symptomatic, with palpitations, tachycardia, and has been seen in the ER in the past for it.  He is followed with Dr. Giang who he saw last year, and discussed antiarrhythmic therapy versus ablation, elected to pursue antiarrhythmic therapy given severe symptomatic nature of atrial fibrillation at that time.  Also with borderline age, elected to pursue anticoagulation, and currently on apixaban 5 b.i.d., he reports tolerating anticoagulation well, denies any falls, no bleeding, no hematemesis, no hematochezia.  Given alcohol abuse, discussed treating this prior to ablation.  From atrial fibrillation standpoint he reports approximately 1 episode a month, which last anywhere from 15 min to several hours.  He currently is on flecainide 100 b.i.d., and reports compliance with this medication, but also is on metoprolol succinate 25 daily, and only reports intermittent compliance with this medication due to fatigue.  In addition to succinate 25 daily he also has a prescription for 25 tartrate p.r.n., which he takes when he goes into episodes of atrial fibrillation.     He reports a persistent difficult to control hypertriglyceridemia, and currently on Crestor 20 which was recently increased as well as Lovaza and fenofibrate.  Most recent LDL 11.    He previously has discussed detox therapy with Psychiatry, however they elected for inpatient rehab given cardiac issues, and he declined.    PMHx:  Paroxysmal Atrial  Fibrillation  EtOH abuse  Hypertriglyceridemia  Elevated LFTs    Review of Systems   Constitution: Positive for malaise/fatigue.   HENT: Negative.    Eyes: Negative.    Cardiovascular: Positive for irregular heartbeat and palpitations.   Respiratory: Negative.    Hematologic/Lymphatic: Negative.    Skin: Negative.    Musculoskeletal: Negative.    Gastrointestinal: Negative.    Genitourinary: Negative.        Medications:  Current Outpatient Medications on File Prior to Visit   Medication Sig    allopurinol (ZYLOPRIM) 100 MG tablet TAKE 2 TABLETS BY MOUTH DAILY    fenofibrate 160 MG Tab Take 1 tablet (160 mg total) by mouth every evening.    LORazepam (ATIVAN) 0.5 MG tablet TAKE 1 TABLET BY MOUTH EVERY 12 HOURS AS NEEDED FOR ANXIETY    ranitidine (ZANTAC) 150 MG capsule Take 150 mg by mouth 2 (two) times daily.    venlafaxine (EFFEXOR) 37.5 MG Tab Take 1 tablet (37.5 mg total) by mouth 2 (two) times daily.    [DISCONTINUED] flecainide (TAMBOCOR) 100 MG Tab Take 1 tablet (100 mg total) by mouth 2 (two) times daily.    [DISCONTINUED] metoprolol succinate (TOPROL-XL) 25 MG 24 hr tablet Take 1 tablet (25 mg total) by mouth once daily.    [DISCONTINUED] metoprolol tartrate (LOPRESSOR) 25 MG tablet Take 25 mg by mouth 2 (two) times daily as needed.    [DISCONTINUED] rosuvastatin (CRESTOR) 20 MG tablet Take 1 tablet (20 mg total) by mouth once daily.    omega-3 acid ethyl esters (LOVAZA) 1 gram capsule Take 2 g by mouth 2 (two) times daily.    [DISCONTINUED] apixaban 5 mg Tab Take 1 tablet (5 mg total) by mouth 2 (two) times daily.    [DISCONTINUED] aspirin 81 MG Chew Take 1 tablet (81 mg total) by mouth once daily.     No current facility-administered medications on file prior to visit.      Family History:  Kleber's family history includes Cancer in his father; Colon polyps in his brother; Diabetes in his mother; Lung cancer in his father and sister.    Social History:  Kleber reports that  has never smoked.  "he has never used smokeless tobacco. He reports that he drinks about 4.2 oz of alcohol per week. He reports that he does not use drugs.    Objective:   /71 (BP Location: Left arm, Patient Position: Sitting, BP Method: X-Large (Automatic))   Pulse 70   Ht 5' 6" (1.676 m)   Wt 83.6 kg (184 lb 4.9 oz)   SpO2 100%   BMI 29.75 kg/m²     Physical Exam   Constitutional: He is oriented to person, place, and time and well-developed, well-nourished, and in no distress. No distress.   HENT:   Head: Normocephalic and atraumatic.   Mouth/Throat: No oropharyngeal exudate.   Eyes: EOM are normal. No scleral icterus.   Neck: No JVD present. No tracheal deviation present. No thyromegaly present.   Cardiovascular: Normal rate and regular rhythm. Exam reveals no gallop and no friction rub.   No murmur heard.  Pulmonary/Chest: Effort normal and breath sounds normal. No respiratory distress. He has no wheezes. He has no rales. He exhibits no tenderness.   Abdominal: Soft. He exhibits no distension. There is no tenderness. There is no rebound and no guarding.   Musculoskeletal: Normal range of motion. He exhibits no edema.   Neurological: He is alert and oriented to person, place, and time.   Skin: Skin is warm and dry. He is not diaphoretic. There is erythema.   Facial plethora   Psychiatric: Affect normal.     EKG:  My independent visualization of most recent EKG is normal sinus rhythm, nonspecific ST changes, borderline left atrial enlargement    TTE:  10/12/2018  CONCLUSIONS     1 - Normal left ventricular systolic function (EF 60-65%).     2 - Biatrial enlargement.     3 - No wall motion abnormalities.     4 - Quantitatively measured LV function is 67%.     5 - Indeterminate LV diastolic function.     6 - Normal right ventricular systolic function .     7 - The estimated PA systolic pressure is greater than 24 mmHg.     8 - Mild tricuspid regurgitation.     Lipids:  Recent Labs   Lab 01/17/19  1100   LDL CHOLESTEROL 11.2 " L   HDL 27 L   CHOLESTEROL 97 L      Holter 08/01/2018  PREDOMINANT RHYTHM  1. Sinus rhythm with heart rates varying between 44 and 82 bpm with an average of 55 bpm.     VENTRICULAR ARRHYTHMIAS  1. There were very rare PVCs recorded totalling 41 and averaging less than 1 per hour.     2. There were no episodes of ventricular tachycardia.    SUPRA VENTRICULAR ARRHYTHMIAS  1. There were very rare PACs recorded totalling 15 and averaging less than 1 per hour.  There were 3 couplets.    2. There were no episodes of sustained supraventricular tachycardia.        Assessment:     1. Paroxysmal atrial fibrillation    2. Hypertriglyceridemia    3. Hyperlipidemia    4. Elevated LFTs    5. Alcohol abuse        Plan:   1. Paroxysmal atrial fibrillation  Given persistent symptomatic nature, recommended continuing antiarrhythmic therapy, discussed that would be reasonable to manage alcohol intake prior to any invasive procedures such as pvi, and patient agreed.  With breakthrough episodes on flecainide options include sotalol or amiodarone, however with ongoing liver dysfunction hesitant to pursue this, and if he is already having fatigue on beta blockers, unsure if sotalol will be much more helpful.  Could consider increasing flecainide to 150 b.i.d., will touch base with Dr. Giang.  In the meantime since he is not compliant with daily metoprolol dosage, will start diltiazem instead, reinforced importance of compliance with AV cornel blocking agent.  - metoprolol tartrate (LOPRESSOR) 25 MG tablet; Take 1 tablet (25 mg total) by mouth 2 (two) times daily as needed.  Dispense: 60 tablet; Refill: 3  - flecainide (TAMBOCOR) 100 MG Tab; Take 1 tablet (100 mg total) by mouth 2 (two) times daily.  Dispense: 60 tablet; Refill: 11  - diltiaZEM (DILACOR XR) 120 MG CDCR; Take 1 capsule (120 mg total) by mouth once daily.  Dispense: 30 capsule; Refill: 11  - apixaban (ELIQUIS) 5 mg Tab; Take 1 tablet (5 mg total) by mouth 2 (two) times  daily.  Dispense: 60 tablet; Refill: 11    2. Hypertriglyceridemia  Most recent LDL 11, triglycerides down to to 90s, will decrease Crestor to 10 from 20  - rosuvastatin (CRESTOR) 10 MG tablet; Take 1 tablet (10 mg total) by mouth once daily.  Dispense: 90 tablet; Refill: 3    3. Hyperlipidemia  Decreasing Crestor to 10 from 20  - aspirin 81 MG Chew; Take 1 tablet (81 mg total) by mouth once daily.  Dispense: 30 tablet; Refill: 11    4. Elevated LFTs  Encouraged follow-up with hepatology  - aspirin 81 MG Chew; Take 1 tablet (81 mg total) by mouth once daily.  Dispense: 30 tablet; Refill: 11    5. Alcohol abuse  From a cardiovascular perspective, okay for outpatient detox, with medical management as above  - aspirin 81 MG Chew; Take 1 tablet (81 mg total) by mouth once daily.  Dispense: 30 tablet; Refill: 11  - Ambulatory consult to Psychiatry    Follow-up in about 1 year (around 3/4/2020).

## 2019-03-07 ENCOUNTER — PATIENT MESSAGE (OUTPATIENT)
Dept: CARDIOLOGY | Facility: CLINIC | Age: 64
End: 2019-03-07

## 2019-03-07 DIAGNOSIS — I48.0 PAROXYSMAL ATRIAL FIBRILLATION: ICD-10-CM

## 2019-03-07 RX ORDER — FLECAINIDE ACETATE 150 MG/1
150 TABLET ORAL 2 TIMES DAILY
Qty: 60 TABLET | Refills: 11 | Status: SHIPPED | OUTPATIENT
Start: 2019-03-07 | End: 2019-06-17

## 2019-03-12 ENCOUNTER — TELEPHONE (OUTPATIENT)
Dept: HEPATOLOGY | Facility: CLINIC | Age: 64
End: 2019-03-12

## 2019-03-12 NOTE — TELEPHONE ENCOUNTER
Call returned to Chani, daughter of the patient at [phone]. No Answer.  Call placed to the patient at [phone]. No Answer.  Left same message on both voice mail was calling from the Office of Marlena Sibley regarding rescheduling the Liver Biopsy.  Please call us back at [phone] my name is Marie.

## 2019-03-12 NOTE — TELEPHONE ENCOUNTER
----- Message from Sandra Mederos MA sent at 3/12/2019 11:01 AM CDT -----  Contact: PT Daughter- Chani      ----- Message -----  From: Zunilda Teresa  Sent: 3/11/2019  12:30 PM  To: [facility] Staff        Reason for call: Needs to speak to someone in regards to rescheduling the liver biopsy for her father.         Communication Preference: [phone]    Additional Information:

## 2019-03-12 NOTE — TELEPHONE ENCOUNTER
Patient returned the call to the clinic to discuss rescheduling the Liver Biopsy.  Patient ready to get the Procedure done.  Patient mailed out instructions on 2/19/19. Pt went and got the Liver Biopsy Instructions and instructed to review.  Patient stated he has not taken Aspirin in 6 mos.  Stopped Eliquis on [place] 3/10/19.  Stopped Lovaza today today 3/12/19.    Request sent to Radiology for an appt and to check if the patient needs to repeat anything.  Patient informed we will get back to him.  Voiced understanding.

## 2019-03-13 ENCOUNTER — TELEPHONE (OUTPATIENT)
Dept: HEPATOLOGY | Facility: CLINIC | Age: 64
End: 2019-03-13

## 2019-03-13 NOTE — TELEPHONE ENCOUNTER
Received call from Radiology with a date for the Rescheduled Liver Biopsy for Tues 3/19/19 with Labs at 7:30 am, Dosc at 8 am, and Procedure at 10 am.    Patient called at [phone]. No Answer.  Left message that we have a date for the Liver Biopsy for Tuesday 3/19/19.  Please call the Office of Marlena Sibley at [facility] at [phone] and ask for Marie.

## 2019-03-14 ENCOUNTER — TELEPHONE (OUTPATIENT)
Dept: HEPATOLOGY | Facility: CLINIC | Age: 64
End: 2019-03-14

## 2019-03-14 DIAGNOSIS — F10.10 ALCOHOL ABUSE: ICD-10-CM

## 2019-03-14 DIAGNOSIS — R74.8 ELEVATED LIVER ENZYMES: Primary | ICD-10-CM

## 2019-03-14 DIAGNOSIS — K76.0 FATTY LIVER: ICD-10-CM

## 2019-03-14 NOTE — TELEPHONE ENCOUNTER
Patient had an appointment today 3/14/19 at 8:40 am with Marlena Sibley NP. No Show.  Spoke with Marlena Sibley NP.  The patient can be rescheduled after completion of the Liver Biopsy.    2nd call to patient on [phone] to confirm if he would like to do the Liver Biopsy on Tues 3/19/19 with Labs at 7:30 am and Dosc at 8 am. No Answer again. Left message to call the Office of Marlena Sibley at [phone] if the appt will work for you.  We will reschedule today's appt until after the Liver Biopsy. My name is Marie.

## 2019-03-15 ENCOUNTER — HOSPITAL ENCOUNTER (INPATIENT)
Facility: HOSPITAL | Age: 64
LOS: 14 days | Discharge: HOME | DRG: 917 | End: 2019-03-29
Attending: EMERGENCY MEDICINE | Admitting: INTERNAL MEDICINE
Payer: COMMERCIAL

## 2019-03-15 DIAGNOSIS — I48.0 PAROXYSMAL ATRIAL FIBRILLATION: ICD-10-CM

## 2019-03-15 DIAGNOSIS — I48.0 PAROXYSMAL A-FIB: ICD-10-CM

## 2019-03-15 DIAGNOSIS — Z92.89 HISTORY OF ETT: ICD-10-CM

## 2019-03-15 DIAGNOSIS — G93.41 ACUTE METABOLIC ENCEPHALOPATHY: Primary | ICD-10-CM

## 2019-03-15 DIAGNOSIS — R00.0 TACHYCARDIA: ICD-10-CM

## 2019-03-15 DIAGNOSIS — G93.40 ENCEPHALOPATHY: ICD-10-CM

## 2019-03-15 DIAGNOSIS — R07.9 CHEST PAIN: ICD-10-CM

## 2019-03-15 DIAGNOSIS — Z01.818 ENCOUNTER FOR INTUBATION: ICD-10-CM

## 2019-03-15 DIAGNOSIS — F10.939 ALCOHOL WITHDRAWAL: ICD-10-CM

## 2019-03-15 DIAGNOSIS — I48.91 AF (ATRIAL FIBRILLATION): ICD-10-CM

## 2019-03-15 DIAGNOSIS — R79.89 ELEVATED LFTS: ICD-10-CM

## 2019-03-15 DIAGNOSIS — F10.931 ALCOHOL WITHDRAWAL SYNDROME, WITH DELIRIUM: ICD-10-CM

## 2019-03-15 DIAGNOSIS — R00.1 BRADYCARDIA: ICD-10-CM

## 2019-03-15 DIAGNOSIS — I48.92 ATRIAL FLUTTER: ICD-10-CM

## 2019-03-15 DIAGNOSIS — I48.91 ATRIAL FIBRILLATION, UNSPECIFIED TYPE: ICD-10-CM

## 2019-03-15 PROBLEM — I46.9 CARDIAC ARREST: Status: ACTIVE | Noted: 2019-03-15

## 2019-03-15 PROBLEM — Z99.11 ON MECHANICALLY ASSISTED VENTILATION: Status: ACTIVE | Noted: 2019-03-15

## 2019-03-15 PROBLEM — E87.1 HYPONATREMIA: Status: ACTIVE | Noted: 2019-03-15

## 2019-03-15 PROBLEM — E87.29 METABOLIC ACIDOSIS, INCREASED ANION GAP (IAG): Status: ACTIVE | Noted: 2019-03-15

## 2019-03-15 PROBLEM — E87.20 LACTIC ACIDOSIS: Status: ACTIVE | Noted: 2019-03-15

## 2019-03-15 PROBLEM — T44.7X1A: Status: ACTIVE | Noted: 2019-03-15

## 2019-03-15 LAB
ALBUMIN SERPL BCP-MCNC: 3.7 G/DL
ALLENS TEST: ABNORMAL
ALP SERPL-CCNC: 53 U/L
ALT SERPL W/O P-5'-P-CCNC: 334 U/L
ANION GAP SERPL CALC-SCNC: 14 MMOL/L
ANION GAP SERPL CALC-SCNC: 18 MMOL/L
APTT BLDCRRT: 23.3 SEC
ASCENDING AORTA: 3.47 CM
AST SERPL-CCNC: 1720 U/L
AV INDEX (PROSTH): 0.76
AV MEAN GRADIENT: 2.63 MMHG
AV PEAK GRADIENT: 3.84 MMHG
AV VALVE AREA: 2.36 CM2
AV VELOCITY RATIO: 0.79
BACTERIA #/AREA URNS AUTO: ABNORMAL /HPF
BASOPHILS # BLD AUTO: 0.1 K/UL
BASOPHILS # BLD AUTO: 0.13 K/UL
BASOPHILS NFR BLD: 0.8 %
BASOPHILS NFR BLD: 1.1 %
BILIRUB SERPL-MCNC: 1.8 MG/DL
BILIRUB UR QL STRIP: NEGATIVE
BNP SERPL-MCNC: 258 PG/ML
BSA FOR ECHO PROCEDURE: 1.97 M2
BUN SERPL-MCNC: 11 MG/DL
BUN SERPL-MCNC: 14 MG/DL
CALCIUM SERPL-MCNC: 8.2 MG/DL
CALCIUM SERPL-MCNC: 8.8 MG/DL
CHLORIDE SERPL-SCNC: 91 MMOL/L
CHLORIDE SERPL-SCNC: 92 MMOL/L
CK SERPL-CCNC: 189 U/L
CLARITY UR REFRACT.AUTO: ABNORMAL
CO2 SERPL-SCNC: 16 MMOL/L
CO2 SERPL-SCNC: 17 MMOL/L
COLOR UR AUTO: YELLOW
CREAT SERPL-MCNC: 2 MG/DL
CREAT SERPL-MCNC: 2.1 MG/DL
CV ECHO LV RWT: 0.56 CM
DELSYS: ABNORMAL
DIFFERENTIAL METHOD: ABNORMAL
DIFFERENTIAL METHOD: ABNORMAL
DOP CALC AO PEAK VEL: 0.98 M/S
DOP CALC AO VTI: 18.81 CM
DOP CALC LVOT AREA: 3.11 CM2
DOP CALC LVOT DIAMETER: 1.99 CM
DOP CALC LVOT PEAK VEL: 0.77 M/S
DOP CALC LVOT STROKE VOLUME: 44.45 CM3
DOP CALCLVOT PEAK VEL VTI: 14.3 CM
ECHO LV POSTERIOR WALL: 1.04 CM (ref 0.6–1.1)
EOSINOPHIL # BLD AUTO: 0.1 K/UL
EOSINOPHIL # BLD AUTO: 0.1 K/UL
EOSINOPHIL NFR BLD: 0.6 %
EOSINOPHIL NFR BLD: 1.6 %
ERYTHROCYTE [DISTWIDTH] IN BLOOD BY AUTOMATED COUNT: 12.5 %
ERYTHROCYTE [DISTWIDTH] IN BLOOD BY AUTOMATED COUNT: 12.6 %
ERYTHROCYTE [SEDIMENTATION RATE] IN BLOOD BY WESTERGREN METHOD: 16 MM/H
EST. GFR  (AFRICAN AMERICAN): 37.3 ML/MIN/1.73 M^2
EST. GFR  (AFRICAN AMERICAN): 39.6 ML/MIN/1.73 M^2
EST. GFR  (NON AFRICAN AMERICAN): 32.3 ML/MIN/1.73 M^2
EST. GFR  (NON AFRICAN AMERICAN): 34.3 ML/MIN/1.73 M^2
ETHANOL SERPL-MCNC: 78 MG/DL
FACT X PPP CHRO-ACNC: 0.16 IU/ML
FIO2: 60
FRACTIONAL SHORTENING: 26 % (ref 28–44)
GLUCOSE SERPL-MCNC: 172 MG/DL
GLUCOSE SERPL-MCNC: 178 MG/DL
GLUCOSE UR QL STRIP: ABNORMAL
HCO3 UR-SCNC: 17.1 MMOL/L (ref 24–28)
HCT VFR BLD AUTO: 39.4 %
HCT VFR BLD AUTO: 40.5 %
HGB BLD-MCNC: 12.8 G/DL
HGB BLD-MCNC: 13 G/DL
HGB UR QL STRIP: ABNORMAL
HYALINE CASTS UR QL AUTO: 3 /LPF
IMM GRANULOCYTES # BLD AUTO: 0.05 K/UL
IMM GRANULOCYTES # BLD AUTO: 0.33 K/UL
IMM GRANULOCYTES NFR BLD AUTO: 0.6 %
IMM GRANULOCYTES NFR BLD AUTO: 1.9 %
INR PPP: 1.2
INTERVENTRICULAR SEPTUM: 0.88 CM (ref 0.6–1.1)
KETONES UR QL STRIP: NEGATIVE
LA MAJOR: 5.4 CM
LA MINOR: 3.99 CM
LA WIDTH: 3.38 CM
LACTATE SERPL-SCNC: 1.9 MMOL/L
LACTATE SERPL-SCNC: 8.1 MMOL/L
LEFT ATRIUM SIZE: 3.99 CM
LEFT ATRIUM VOLUME INDEX: 27.3 ML/M2
LEFT ATRIUM VOLUME: 52.61 CM3
LEFT INTERNAL DIMENSION IN SYSTOLE: 2.76 CM (ref 2.1–4)
LEFT VENTRICLE DIASTOLIC VOLUME INDEX: 30.43 ML/M2
LEFT VENTRICLE DIASTOLIC VOLUME: 58.6 ML
LEFT VENTRICLE MASS INDEX: 55.3 G/M2
LEFT VENTRICLE SYSTOLIC VOLUME INDEX: 14.8 ML/M2
LEFT VENTRICLE SYSTOLIC VOLUME: 28.56 ML
LEFT VENTRICULAR INTERNAL DIMENSION IN DIASTOLE: 3.71 CM (ref 3.5–6)
LEFT VENTRICULAR MASS: 106.59 G
LEUKOCYTE ESTERASE UR QL STRIP: NEGATIVE
LIPASE SERPL-CCNC: 82 U/L
LYMPHOCYTES # BLD AUTO: 3.5 K/UL
LYMPHOCYTES # BLD AUTO: 6.3 K/UL
LYMPHOCYTES NFR BLD: 36.6 %
LYMPHOCYTES NFR BLD: 39.1 %
MAGNESIUM SERPL-MCNC: 1.7 MG/DL
MCH RBC QN AUTO: 33.4 PG
MCH RBC QN AUTO: 34.3 PG
MCHC RBC AUTO-ENTMCNC: 31.6 G/DL
MCHC RBC AUTO-ENTMCNC: 33 G/DL
MCV RBC AUTO: 101 FL
MCV RBC AUTO: 109 FL
MICROSCOPIC COMMENT: ABNORMAL
MIN VOL: 9.37
MODE: ABNORMAL
MONOCYTES # BLD AUTO: 0.7 K/UL
MONOCYTES # BLD AUTO: 1.1 K/UL
MONOCYTES NFR BLD: 6.6 %
MONOCYTES NFR BLD: 7.3 %
NEUTROPHILS # BLD AUTO: 4.5 K/UL
NEUTROPHILS # BLD AUTO: 9.2 K/UL
NEUTROPHILS NFR BLD: 50.3 %
NEUTROPHILS NFR BLD: 53.5 %
NITRITE UR QL STRIP: NEGATIVE
NRBC BLD-RTO: 1 /100 WBC
NRBC BLD-RTO: 1 /100 WBC
OSMOLALITY SERPL: 283 MOSM/KG
PCO2 BLDA: 34.4 MMHG (ref 35–45)
PEEP: 5
PH SMN: 7.3 [PH] (ref 7.35–7.45)
PH UR STRIP: 6 [PH] (ref 5–8)
PIP: 24
PISA TR MAX VEL: 2.16 M/S
PLATELET # BLD AUTO: 114 K/UL
PLATELET # BLD AUTO: 125 K/UL
PMV BLD AUTO: 9.5 FL
PMV BLD AUTO: 9.7 FL
PO2 BLDA: 146 MMHG (ref 80–100)
POC BE: -9 MMOL/L
POC SATURATED O2: 99 % (ref 95–100)
POC TCO2: 18 MMOL/L (ref 23–27)
POTASSIUM SERPL-SCNC: 3.2 MMOL/L
POTASSIUM SERPL-SCNC: 3.8 MMOL/L
PROT SERPL-MCNC: 7.1 G/DL
PROT UR QL STRIP: NEGATIVE
PROTHROMBIN TIME: 12.2 SEC
RA MAJOR: 5.23 CM
RA WIDTH: 3.19 CM
RBC # BLD AUTO: 3.73 M/UL
RBC # BLD AUTO: 3.89 M/UL
RBC #/AREA URNS AUTO: 0 /HPF (ref 0–4)
RIGHT VENTRICULAR END-DIASTOLIC DIMENSION: 3.34 CM
SAMPLE: ABNORMAL
SINUS: 3.52 CM
SITE: ABNORMAL
SODIUM SERPL-SCNC: 123 MMOL/L
SODIUM SERPL-SCNC: 125 MMOL/L
SODIUM UR-SCNC: <20 MMOL/L
SP GR UR STRIP: 1 (ref 1–1.03)
SP02: 100
STJ: 3.49 CM
TDI LATERAL: 0.07
TDI SEPTAL: 0.08
TDI: 0.08
TR MAX PG: 18.66 MMHG
TRICUSPID ANNULAR PLANE SYSTOLIC EXCURSION: 1.64 CM
TROPONIN I SERPL DL<=0.01 NG/ML-MCNC: 0.01 NG/ML
URN SPEC COLLECT METH UR: ABNORMAL
VT: 500
WBC # BLD AUTO: 17.23 K/UL
WBC # BLD AUTO: 8.9 K/UL
WBC #/AREA URNS AUTO: 0 /HPF (ref 0–5)

## 2019-03-15 PROCEDURE — 80053 COMPREHEN METABOLIC PANEL: CPT

## 2019-03-15 PROCEDURE — 84484 ASSAY OF TROPONIN QUANT: CPT

## 2019-03-15 PROCEDURE — 96367 TX/PROPH/DG ADDL SEQ IV INF: CPT

## 2019-03-15 PROCEDURE — 99291 CRITICAL CARE FIRST HOUR: CPT | Mod: ,,, | Performed by: INTERNAL MEDICINE

## 2019-03-15 PROCEDURE — 83605 ASSAY OF LACTIC ACID: CPT | Mod: 91

## 2019-03-15 PROCEDURE — 36600 WITHDRAWAL OF ARTERIAL BLOOD: CPT

## 2019-03-15 PROCEDURE — 85025 COMPLETE CBC W/AUTO DIFF WBC: CPT

## 2019-03-15 PROCEDURE — 85610 PROTHROMBIN TIME: CPT

## 2019-03-15 PROCEDURE — 95812 EEG 41-60 MINUTES: CPT

## 2019-03-15 PROCEDURE — 99285 EMERGENCY DEPT VISIT HI MDM: CPT | Mod: 25

## 2019-03-15 PROCEDURE — 51701 INSERT BLADDER CATHETER: CPT

## 2019-03-15 PROCEDURE — 99291 PR CRITICAL CARE, E/M 30-74 MINUTES: ICD-10-PCS | Mod: ,,, | Performed by: EMERGENCY MEDICINE

## 2019-03-15 PROCEDURE — 83930 ASSAY OF BLOOD OSMOLALITY: CPT

## 2019-03-15 PROCEDURE — 81001 URINALYSIS AUTO W/SCOPE: CPT

## 2019-03-15 PROCEDURE — 82550 ASSAY OF CK (CPK): CPT

## 2019-03-15 PROCEDURE — 96375 TX/PRO/DX INJ NEW DRUG ADDON: CPT

## 2019-03-15 PROCEDURE — 87040 BLOOD CULTURE FOR BACTERIA: CPT | Mod: 59

## 2019-03-15 PROCEDURE — 96365 THER/PROPH/DIAG IV INF INIT: CPT | Mod: 59

## 2019-03-15 PROCEDURE — 85520 HEPARIN ASSAY: CPT

## 2019-03-15 PROCEDURE — 94761 N-INVAS EAR/PLS OXIMETRY MLT: CPT

## 2019-03-15 PROCEDURE — 31500 INSERT EMERGENCY AIRWAY: CPT

## 2019-03-15 PROCEDURE — 93010 ELECTROCARDIOGRAM REPORT: CPT | Mod: ,,, | Performed by: INTERNAL MEDICINE

## 2019-03-15 PROCEDURE — 51702 INSERT TEMP BLADDER CATH: CPT

## 2019-03-15 PROCEDURE — 63600175 PHARM REV CODE 636 W HCPCS: Performed by: STUDENT IN AN ORGANIZED HEALTH CARE EDUCATION/TRAINING PROGRAM

## 2019-03-15 PROCEDURE — 83690 ASSAY OF LIPASE: CPT

## 2019-03-15 PROCEDURE — 99900035 HC TECH TIME PER 15 MIN (STAT)

## 2019-03-15 PROCEDURE — 93005 ELECTROCARDIOGRAM TRACING: CPT

## 2019-03-15 PROCEDURE — 82803 BLOOD GASES ANY COMBINATION: CPT

## 2019-03-15 PROCEDURE — 80320 DRUG SCREEN QUANTALCOHOLS: CPT

## 2019-03-15 PROCEDURE — 96368 THER/DIAG CONCURRENT INF: CPT

## 2019-03-15 PROCEDURE — 96366 THER/PROPH/DIAG IV INF ADDON: CPT

## 2019-03-15 PROCEDURE — 99291 PR CRITICAL CARE, E/M 30-74 MINUTES: ICD-10-PCS | Mod: ,,, | Performed by: INTERNAL MEDICINE

## 2019-03-15 PROCEDURE — 25000003 PHARM REV CODE 250: Performed by: STUDENT IN AN ORGANIZED HEALTH CARE EDUCATION/TRAINING PROGRAM

## 2019-03-15 PROCEDURE — 94002 VENT MGMT INPAT INIT DAY: CPT

## 2019-03-15 PROCEDURE — 63600175 PHARM REV CODE 636 W HCPCS: Performed by: EMERGENCY MEDICINE

## 2019-03-15 PROCEDURE — A4216 STERILE WATER/SALINE, 10 ML: HCPCS | Performed by: INTERNAL MEDICINE

## 2019-03-15 PROCEDURE — 99223 1ST HOSP IP/OBS HIGH 75: CPT | Mod: ,,, | Performed by: INTERNAL MEDICINE

## 2019-03-15 PROCEDURE — 43752 NASAL/OROGASTRIC W/TUBE PLMT: CPT

## 2019-03-15 PROCEDURE — 25000003 PHARM REV CODE 250: Performed by: EMERGENCY MEDICINE

## 2019-03-15 PROCEDURE — 84300 ASSAY OF URINE SODIUM: CPT

## 2019-03-15 PROCEDURE — 99291 CRITICAL CARE FIRST HOUR: CPT | Mod: ,,, | Performed by: EMERGENCY MEDICINE

## 2019-03-15 PROCEDURE — 25000003 PHARM REV CODE 250: Performed by: INTERNAL MEDICINE

## 2019-03-15 PROCEDURE — 27000221 HC OXYGEN, UP TO 24 HOURS

## 2019-03-15 PROCEDURE — 85730 THROMBOPLASTIN TIME PARTIAL: CPT

## 2019-03-15 PROCEDURE — 80048 BASIC METABOLIC PNL TOTAL CA: CPT

## 2019-03-15 PROCEDURE — 83880 ASSAY OF NATRIURETIC PEPTIDE: CPT

## 2019-03-15 PROCEDURE — 93010 EKG 12-LEAD: ICD-10-PCS | Mod: ,,, | Performed by: INTERNAL MEDICINE

## 2019-03-15 PROCEDURE — 63600175 PHARM REV CODE 636 W HCPCS: Performed by: INTERNAL MEDICINE

## 2019-03-15 PROCEDURE — 83735 ASSAY OF MAGNESIUM: CPT

## 2019-03-15 PROCEDURE — 99900026 HC AIRWAY MAINTENANCE (STAT)

## 2019-03-15 PROCEDURE — 96376 TX/PRO/DX INJ SAME DRUG ADON: CPT

## 2019-03-15 PROCEDURE — 25000003 PHARM REV CODE 250

## 2019-03-15 PROCEDURE — 99223 PR INITIAL HOSPITAL CARE,LEVL III: ICD-10-PCS | Mod: ,,, | Performed by: INTERNAL MEDICINE

## 2019-03-15 PROCEDURE — 20000000 HC ICU ROOM

## 2019-03-15 PROCEDURE — C9113 INJ PANTOPRAZOLE SODIUM, VIA: HCPCS | Performed by: INTERNAL MEDICINE

## 2019-03-15 PROCEDURE — 96361 HYDRATE IV INFUSION ADD-ON: CPT | Mod: 59

## 2019-03-15 RX ORDER — PANTOPRAZOLE SODIUM 40 MG/10ML
40 INJECTION, POWDER, LYOPHILIZED, FOR SOLUTION INTRAVENOUS EVERY 24 HOURS
Status: DISCONTINUED | OUTPATIENT
Start: 2019-03-15 | End: 2019-03-15

## 2019-03-15 RX ORDER — SODIUM CHLORIDE 9 MG/ML
500 INJECTION, SOLUTION INTRAVENOUS
Status: COMPLETED | OUTPATIENT
Start: 2019-03-15 | End: 2019-03-15

## 2019-03-15 RX ORDER — ATROPINE SULFATE 0.1 MG/ML
INJECTION INTRAVENOUS CODE/TRAUMA/SEDATION MEDICATION
Status: COMPLETED | OUTPATIENT
Start: 2019-03-15 | End: 2019-03-15

## 2019-03-15 RX ORDER — ROCURONIUM BROMIDE 10 MG/ML
INJECTION, SOLUTION INTRAVENOUS
Status: COMPLETED
Start: 2019-03-15 | End: 2019-03-15

## 2019-03-15 RX ORDER — EPINEPHRINE 0.1 MG/ML
INJECTION INTRAVENOUS CODE/TRAUMA/SEDATION MEDICATION
Status: COMPLETED | OUTPATIENT
Start: 2019-03-15 | End: 2019-03-15

## 2019-03-15 RX ORDER — OMEGA-3-ACID ETHYL ESTERS 1 G/1
2 CAPSULE, LIQUID FILLED ORAL 2 TIMES DAILY
Status: DISCONTINUED | OUTPATIENT
Start: 2019-03-15 | End: 2019-03-15

## 2019-03-15 RX ORDER — NOREPINEPHRINE BITARTRATE/D5W 4MG/250ML
0.02 PLASTIC BAG, INJECTION (ML) INTRAVENOUS CONTINUOUS
Status: DISCONTINUED | OUTPATIENT
Start: 2019-03-15 | End: 2019-03-16

## 2019-03-15 RX ORDER — LORAZEPAM 2 MG/ML
2 INJECTION INTRAMUSCULAR
Status: DISCONTINUED | OUTPATIENT
Start: 2019-03-15 | End: 2019-03-15

## 2019-03-15 RX ORDER — SUCCINYLCHOLINE CHLORIDE 20 MG/ML
INJECTION INTRAMUSCULAR; INTRAVENOUS
Status: DISCONTINUED
Start: 2019-03-15 | End: 2019-03-15 | Stop reason: WASHOUT

## 2019-03-15 RX ORDER — VANCOMYCIN HCL IN 5 % DEXTROSE 1G/250ML
1000 PLASTIC BAG, INJECTION (ML) INTRAVENOUS
Status: DISCONTINUED | OUTPATIENT
Start: 2019-03-15 | End: 2019-03-17

## 2019-03-15 RX ORDER — CHLORDIAZEPOXIDE HYDROCHLORIDE 25 MG/1
50 CAPSULE, GELATIN COATED ORAL 4 TIMES DAILY PRN
Status: DISCONTINUED | OUTPATIENT
Start: 2019-03-15 | End: 2019-03-15

## 2019-03-15 RX ORDER — ATROPINE SULFATE 1 MG/ML
1 INJECTION, SOLUTION INTRAMUSCULAR; INTRAVENOUS; SUBCUTANEOUS ONCE AS NEEDED
Status: DISCONTINUED | OUTPATIENT
Start: 2019-03-15 | End: 2019-03-15

## 2019-03-15 RX ORDER — CEFEPIME HYDROCHLORIDE 1 G/1
1 INJECTION, POWDER, FOR SOLUTION INTRAMUSCULAR; INTRAVENOUS
Status: DISCONTINUED | OUTPATIENT
Start: 2019-03-15 | End: 2019-03-17

## 2019-03-15 RX ORDER — NOREPINEPHRINE BITARTRATE/D5W 4MG/250ML
PLASTIC BAG, INJECTION (ML) INTRAVENOUS
Status: COMPLETED
Start: 2019-03-15 | End: 2019-03-15

## 2019-03-15 RX ORDER — FENOFIBRATE 160 MG/1
160 TABLET ORAL NIGHTLY
Status: DISCONTINUED | OUTPATIENT
Start: 2019-03-15 | End: 2019-03-15

## 2019-03-15 RX ORDER — PANTOPRAZOLE SODIUM 40 MG/10ML
40 INJECTION, POWDER, LYOPHILIZED, FOR SOLUTION INTRAVENOUS DAILY
Status: DISCONTINUED | OUTPATIENT
Start: 2019-03-15 | End: 2019-03-22

## 2019-03-15 RX ORDER — POTASSIUM CHLORIDE 20 MEQ/1
40 TABLET, EXTENDED RELEASE ORAL ONCE
Status: COMPLETED | OUTPATIENT
Start: 2019-03-15 | End: 2019-03-15

## 2019-03-15 RX ORDER — ENOXAPARIN SODIUM 100 MG/ML
40 INJECTION SUBCUTANEOUS EVERY 24 HOURS
Status: DISCONTINUED | OUTPATIENT
Start: 2019-03-15 | End: 2019-03-15

## 2019-03-15 RX ORDER — FAMOTIDINE 20 MG/1
20 TABLET, FILM COATED ORAL 2 TIMES DAILY
Status: DISCONTINUED | OUTPATIENT
Start: 2019-03-15 | End: 2019-03-15

## 2019-03-15 RX ORDER — ASPIRIN 325 MG
325 TABLET ORAL
Status: COMPLETED | OUTPATIENT
Start: 2019-03-15 | End: 2019-03-15

## 2019-03-15 RX ORDER — LORAZEPAM 2 MG/ML
2 INJECTION INTRAMUSCULAR
Status: DISCONTINUED | OUTPATIENT
Start: 2019-03-15 | End: 2019-03-21

## 2019-03-15 RX ORDER — NAPROXEN SODIUM 220 MG/1
81 TABLET, FILM COATED ORAL DAILY
Status: DISCONTINUED | OUTPATIENT
Start: 2019-03-15 | End: 2019-03-18

## 2019-03-15 RX ORDER — ROSUVASTATIN CALCIUM 10 MG/1
10 TABLET, COATED ORAL DAILY
Status: DISCONTINUED | OUTPATIENT
Start: 2019-03-15 | End: 2019-03-15

## 2019-03-15 RX ORDER — PROPOFOL 10 MG/ML
50 INJECTION, EMULSION INTRAVENOUS CONTINUOUS
Status: DISCONTINUED | OUTPATIENT
Start: 2019-03-15 | End: 2019-03-16

## 2019-03-15 RX ORDER — ALLOPURINOL 100 MG/1
200 TABLET ORAL DAILY
Status: DISCONTINUED | OUTPATIENT
Start: 2019-03-15 | End: 2019-03-18

## 2019-03-15 RX ORDER — GLUCAGON 1 MG
5 KIT INJECTION ONCE
Status: COMPLETED | OUTPATIENT
Start: 2019-03-15 | End: 2019-03-15

## 2019-03-15 RX ORDER — ETOMIDATE 2 MG/ML
INJECTION INTRAVENOUS
Status: COMPLETED
Start: 2019-03-15 | End: 2019-03-15

## 2019-03-15 RX ORDER — HEPARIN SODIUM,PORCINE/D5W 25000/250
23 INTRAVENOUS SOLUTION INTRAVENOUS CONTINUOUS
Status: DISCONTINUED | OUTPATIENT
Start: 2019-03-15 | End: 2019-03-25

## 2019-03-15 RX ORDER — SODIUM CHLORIDE 0.9 % (FLUSH) 0.9 %
3 SYRINGE (ML) INJECTION EVERY 8 HOURS
Status: DISCONTINUED | OUTPATIENT
Start: 2019-03-15 | End: 2019-03-25

## 2019-03-15 RX ORDER — ATROPINE SULFATE 1 MG/ML
0.5 INJECTION, SOLUTION INTRAMUSCULAR; INTRAVENOUS; SUBCUTANEOUS
Status: DISCONTINUED | OUTPATIENT
Start: 2019-03-15 | End: 2019-03-15

## 2019-03-15 RX ORDER — ATROPINE SULFATE 0.4 MG/ML
1 INJECTION, SOLUTION ENDOTRACHEAL; INTRAMEDULLARY; INTRAMUSCULAR; INTRAVENOUS; SUBCUTANEOUS ONCE
Status: COMPLETED | OUTPATIENT
Start: 2019-03-15 | End: 2019-03-15

## 2019-03-15 RX ORDER — SODIUM CHLORIDE 9 MG/ML
INJECTION, SOLUTION INTRAVENOUS CONTINUOUS
Status: DISCONTINUED | OUTPATIENT
Start: 2019-03-15 | End: 2019-03-16

## 2019-03-15 RX ORDER — KETAMINE HYDROCHLORIDE 100 MG/ML
200 INJECTION, SOLUTION INTRAMUSCULAR; INTRAVENOUS
Status: DISCONTINUED | OUTPATIENT
Start: 2019-03-15 | End: 2019-03-15

## 2019-03-15 RX ORDER — VENLAFAXINE 37.5 MG/1
37.5 TABLET ORAL 2 TIMES DAILY
Status: DISCONTINUED | OUTPATIENT
Start: 2019-03-15 | End: 2019-03-16

## 2019-03-15 RX ADMIN — SODIUM CHLORIDE: 0.9 INJECTION, SOLUTION INTRAVENOUS at 04:03

## 2019-03-15 RX ADMIN — ROCURONIUM BROMIDE 100 MG: 10 INJECTION, SOLUTION INTRAVENOUS at 11:03

## 2019-03-15 RX ADMIN — LORAZEPAM 2 MG: 2 INJECTION INTRAMUSCULAR; INTRAVENOUS at 10:03

## 2019-03-15 RX ADMIN — CEFEPIME 1 G: 1 INJECTION, POWDER, FOR SOLUTION INTRAMUSCULAR; INTRAVENOUS at 01:03

## 2019-03-15 RX ADMIN — VENLAFAXINE 37.5 MG: 37.5 TABLET ORAL at 10:03

## 2019-03-15 RX ADMIN — Medication 0.01 MCG/KG/MIN: at 11:03

## 2019-03-15 RX ADMIN — ATROPINE SULFATE 1 MG: 0.4 INJECTION, SOLUTION INTRAMUSCULAR; INTRAVENOUS; SUBCUTANEOUS at 11:03

## 2019-03-15 RX ADMIN — HEPARIN SODIUM AND DEXTROSE 12 UNITS/KG/HR: 10000; 5 INJECTION INTRAVENOUS at 01:03

## 2019-03-15 RX ADMIN — LORAZEPAM 2 MG: 2 INJECTION INTRAMUSCULAR; INTRAVENOUS at 09:03

## 2019-03-15 RX ADMIN — ATROPINE SULFATE 1 MG: 0.1 INJECTION, SOLUTION INTRAVENOUS at 11:03

## 2019-03-15 RX ADMIN — EPINEPHRINE 1 MG: 0.1 INJECTION, SOLUTION ENDOTRACHEAL; INTRACARDIAC; INTRAVENOUS at 11:03

## 2019-03-15 RX ADMIN — POTASSIUM CHLORIDE 40 MEQ: 1500 TABLET, EXTENDED RELEASE ORAL at 10:03

## 2019-03-15 RX ADMIN — GLUCAGON HYDROCHLORIDE 5 MG: KIT at 09:03

## 2019-03-15 RX ADMIN — CALCIUM GLUCONATE 500 MG: 98 INJECTION, SOLUTION INTRAVENOUS at 10:03

## 2019-03-15 RX ADMIN — ETOMIDATE 15 MG: 2 INJECTION INTRAVENOUS at 11:03

## 2019-03-15 RX ADMIN — VANCOMYCIN HYDROCHLORIDE 1000 MG: 1 INJECTION, POWDER, LYOPHILIZED, FOR SOLUTION INTRAVENOUS at 01:03

## 2019-03-15 RX ADMIN — ALLOPURINOL 200 MG: 100 TABLET ORAL at 09:03

## 2019-03-15 RX ADMIN — PROPOFOL 5 MCG/KG/MIN: 10 INJECTION, EMULSION INTRAVENOUS at 02:03

## 2019-03-15 RX ADMIN — MIDAZOLAM 1 MG/HR: 5 INJECTION INTRAMUSCULAR; INTRAVENOUS at 06:03

## 2019-03-15 RX ADMIN — ASPIRIN 325 MG ORAL TABLET 325 MG: 325 PILL ORAL at 09:03

## 2019-03-15 RX ADMIN — SODIUM CHLORIDE 500 ML: 0.9 INJECTION, SOLUTION INTRAVENOUS at 12:03

## 2019-03-15 RX ADMIN — SODIUM CHLORIDE 500 ML: 9 INJECTION, SOLUTION INTRAVENOUS at 08:03

## 2019-03-15 RX ADMIN — GLUCAGON HYDROCHLORIDE 3 MG/HR: 1 INJECTION, POWDER, FOR SOLUTION INTRAMUSCULAR; INTRAVENOUS; SUBCUTANEOUS at 10:03

## 2019-03-15 RX ADMIN — LORAZEPAM 2 MG: 2 INJECTION INTRAMUSCULAR; INTRAVENOUS at 02:03

## 2019-03-15 RX ADMIN — OMEGA-3-ACID ETHYL ESTERS 2 G: 1 CAPSULE, LIQUID FILLED ORAL at 09:03

## 2019-03-15 RX ADMIN — VENLAFAXINE 37.5 MG: 37.5 TABLET ORAL at 09:03

## 2019-03-15 RX ADMIN — THIAMINE HYDROCHLORIDE 100 MG: 100 INJECTION, SOLUTION INTRAMUSCULAR; INTRAVENOUS at 04:03

## 2019-03-15 RX ADMIN — Medication 3 ML: at 04:03

## 2019-03-15 RX ADMIN — FAMOTIDINE 20 MG: 20 TABLET ORAL at 09:03

## 2019-03-15 RX ADMIN — PANTOPRAZOLE SODIUM 40 MG: 40 INJECTION, POWDER, FOR SOLUTION INTRAVENOUS at 01:03

## 2019-03-15 RX ADMIN — ROSUVASTATIN CALCIUM 10 MG: 10 TABLET, FILM COATED ORAL at 09:03

## 2019-03-15 RX ADMIN — SODIUM CHLORIDE 500 ML: 0.9 INJECTION, SOLUTION INTRAVENOUS at 06:03

## 2019-03-15 RX ADMIN — ASPIRIN 81 MG CHEWABLE TABLET 81 MG: 81 TABLET CHEWABLE at 09:03

## 2019-03-15 NOTE — ED NOTES
Called to the bedside because patient had decreased level of consciousness.  He has been attached to the monitor since his initial and evaluation.  The monitor showed a heart rate of 20.  Patient already sees glucagon, atropine, Ativan prior to my arrival in the room.  On MD evaluation the patient became pulseless.  CPR was initiated immediately.  The patient was moved emergently to bed 1.  He received 2 mg of epinephrine and 1 mg of atropine.  We achieved ROSC.  Patient was then intubated.  See procedure note.  Cardiology at bedside.  They will place a transvenous pacer.  Will take to the ICU following pacer placement.  The nurse and I had a conversation with the patient's family at bedside.  They were made aware of all the events described above.  All questions answered.    Please add 35 min of critical care time in addition to the 35 min documented previously.  A total of 70 min of critical care time were spent exclusively on this patient.     Zuhair Fox MD  03/15/19 5923

## 2019-03-15 NOTE — PROGRESS NOTES
Procedure explained to daughter. optison given ivp via right a/c sl . Denies transfusion rxn. Tolerated well. Sl flushed before and after with 10 cc ns. Pt on vent and sedated.

## 2019-03-15 NOTE — ASSESSMENT & PLAN NOTE
- prominent features include hypotension and bradycardia  - ECG with bradyarrhythmia; patient received fluid in the ED  - IV atropine, IV glucagon (bolus followed by gtt) and IV calcium  - TVP initially discussed with Dr. Alem Miller: recommended atropine 1 mg IV  - TVP placed post-ROSC with base rate set at 60 BPM  - f/u TTE with CFD  - hold BB, CCB and flecainide

## 2019-03-15 NOTE — ASSESSMENT & PLAN NOTE
- hold BB, CCB and flecainide  - held NOAC for several days pending liver bx  - IV UFH in lieu of NOAC pending pre-administration labs

## 2019-03-15 NOTE — CONSULTS
[facility]  Cardiology  Consult Note    Patient Name: Donald Warren Abadie  MRN: [id number]  Admission Date: 3/15/2019  Hospital Length of Stay: 0 days  Code Status: Full Code   Attending Provider: Zuhair Fox MD   Consulting Provider: Smith Barone MD  Primary Care Physician: Bacilio Larry MD  Principal Problem:<principal problem not specified>    Patient information was obtained from patient, relative(s), past medical records and ER records.     Inpatient consult to Cardiology  Consult performed by: Smith Barone MD  Consult ordered by: Zuhair Fox MD        Subjective:     Chief Complaint:  Bradycardia     HPI:   64 y/o male with PMH of paroxysmal AD, EtOH abuse ([facility] offered rehabilitation but declined), HTN, HLD, and elevated LFTs who presents with fatigue/malaise. The patient follows in Dr. Tolentino's cardiology clinic as well as with Dr. Giang. AAD versus ablation was discussed in the latter's clinic. He's on Eliquis and decided to pursue ablation. Home meds include Lopressor 25 mg PO BID and diltiazem 120 mg qD with diltiazem started 3/4. The patient is on flecainide 150 mg PO BID. He took several doses of breakthrough metoprolol tartrate 25mg(thinks 3 pills in the last 6 hours) on top of his long acting diltiazem and flecainide. He also notes consuming a case of beer(approximately 6)    ECHO 10/2018    1 - Normal left ventricular systolic function (EF 60-65%).     2 - Biatrial enlargement.     3 - No wall motion abnormalities.     4 - Quantitatively measured LV function is 67%.     5 - Indeterminate LV diastolic function.     6 - Normal right ventricular systolic function .     7 - The estimated PA systolic pressure is greater than 24 mmHg.     8 - Mild tricuspid regurgitation.     Past Medical History:   Diagnosis Date    A-fib     Alcohol abuse     Arthritis     Chronic gout     Diabetes mellitus     DM (diabetes mellitus) 11/16/2016    Fatty liver      Hyperlipidemia     Renal cell cancer 2014       Past Surgical History:   Procedure Laterality Date    ABSCESS DRAINAGE      perirectal    COLONOSCOPY      FISTULOTOMY N/A 5/19/2015    [facility] 2ND FLR    HAND SURGERY Left     HEMORRHOIDECTOMY N/A 5/19/2015    Per[facility] at [facility]ND [facility]    KIDNEY SURGERY      partial left kidney removal - [place]    PARTIAL NEPHRECTOMY Left August 2014    ROBOTIC ASSISTED LAPAROSCOPIC NEPHRECTOMY PARTIAL Left 8/28/2014    Performed by Fabian Estevez MD at [facility] OR [address] [place] [address]       Review of patient's allergies indicates:   Allergen Reactions    No known drug allergies        No current facility-administered medications on file prior to encounter.      Current Outpatient Medications on File Prior to Encounter   Medication Sig    allopurinol (ZYLOPRIM) 100 MG tablet TAKE 2 TABLETS BY MOUTH DAILY    apixaban (ELIQUIS) 5 mg Tab Take 1 tablet (5 mg total) by mouth 2 (two) times daily.    diltiaZEM (DILACOR XR) 120 MG CDCR Take 1 capsule (120 mg total) by mouth once daily.    fenofibrate 160 MG Tab Take 1 tablet (160 mg total) by mouth every evening.    flecainide (TAMBOCOR) 150 MG Tab Take 1 tablet (150 mg total) by mouth 2 (two) times daily.    LORazepam (ATIVAN) 0.5 MG tablet TAKE 1 TABLET BY MOUTH EVERY 12 HOURS AS NEEDED FOR ANXIETY    metoprolol tartrate (LOPRESSOR) 25 MG tablet Take 1 tablet (25 mg total) by mouth 2 (two) times daily as needed.    omega-3 acid ethyl esters (LOVAZA) 1 gram capsule Take 2 g by mouth 2 (two) times daily.    ranitidine (ZANTAC) 150 MG capsule Take 150 mg by mouth 2 (two) times daily.    rosuvastatin (CRESTOR) 10 MG tablet Take 1 tablet (10 mg total) by mouth once daily.    aspirin 81 MG Chew Take 1 tablet (81 mg total) by mouth once daily.    venlafaxine (EFFEXOR) 37.5 MG Tab Take 1 tablet (37.5 mg total) by mouth 2 (two) times daily.     Family History     Problem  Relation (Age of Onset)    Cancer Father    Colon polyps Brother    Diabetes Mother    Lung cancer Father, Sister        Tobacco Use    Smoking status: Never Smoker    Smokeless tobacco: Never Used   Substance and Sexual Activity    Alcohol use: Yes     Alcohol/week: 4.2 oz     Types: 7 Cans of beer per week     Comment: patient states he drinks less than he use to    Drug use: No    Sexual activity: Not on file     Review of Systems   Constitution: Positive for weakness and malaise/fatigue. Negative for chills, diaphoresis, fever and weight loss.   HENT: Negative for congestion, hoarse voice and sore throat.    Eyes: Negative for blurred vision and double vision.   Cardiovascular: Positive for irregular heartbeat. Negative for chest pain, dyspnea on exertion, near-syncope and syncope.   Respiratory: Negative for shortness of breath.    Endocrine: Negative for cold intolerance and heat intolerance.   Hematologic/Lymphatic: Does not bruise/bleed easily.   Gastrointestinal: Negative for abdominal pain, constipation, diarrhea, nausea and vomiting.   Genitourinary: Negative for dysuria.   Neurological: Positive for difficulty with concentration and disturbances in coordination. Negative for focal weakness and sensory change.   Psychiatric/Behavioral: The patient is nervous/anxious.      Objective:     Vital Signs (Most Recent):  Temp: 98 °F (36.7 °C) (03/15/19 0759)  Pulse: (!) 40 (03/15/19 0917)  Resp: 16 (03/15/19 0759)  BP: (!) 79/47 (03/15/19 0917)  SpO2: 95 % (03/15/19 0917) Vital Signs (24h Range):  Temp:  [98 °F (36.7 °C)] 98 °F (36.7 °C)  Pulse:  [33-54] 40  Resp:  [16] 16  SpO2:  [95 %-98 %] 95 %  BP: (75-80)/(40-47) 79/47        There is no height or weight on file to calculate BMI.    SpO2: 95 %  O2 Device (Oxygen Therapy): nasal cannula    No intake or output data in the 24 hours ending 03/15/19 0923    Lines/Drains/Airways     Drain                 Closed/Suction Drain 08/28/14 1452 Left;Lateral  Abdomen Bulb 10 Fr. 1659 days                Physical Exam   Constitutional: He is oriented to person, place, and time. He appears well-developed and well-nourished.   HENT:   Head: Normocephalic and atraumatic.   Eyes: EOM are normal.   Neck: No JVD present.   Cardiovascular: An irregular rhythm present. Bradycardia present. Exam reveals no gallop and no friction rub.   No murmur heard.  Pulmonary/Chest: Effort normal and breath sounds normal. No respiratory distress. He has no wheezes.   Abdominal: Soft. Bowel sounds are normal. He exhibits no distension.   Musculoskeletal: He exhibits no edema.   Neurological: He is alert and oriented to person, place, and time.   Skin: Skin is warm and dry.   Psychiatric: He has a normal mood and affect.       Significant Labs:   No results for input(s): NA, K, CL, CO2, BUN, CREATININE, GLUCOSE, ANIONGAP in the last 168 hours.  No results for input(s): AST, ALT, ALKPHOS, BILITOT, BILIDIR, ALBUMIN in the last 168 hours.  Recent Labs   Lab 03/15/19  0837   *    Recent Labs   Lab 03/15/19  0837   WBC 8.90   HGB 13.0*   HCT 39.4*   *   GRAN 50.3  4.5     No results for input(s): PTT, INR in the last 168 hours.  Lab Results   Component Value Date    CHOL 97 (L) 01/17/2019    HDL 27 (L) 01/17/2019    LDLCALC 11.2 (L) 01/17/2019    TRIG 294 (H) 01/17/2019     Lab Results   Component Value Date    HGBA1C 7.8 (H) 01/17/2019          Significant Imaging: Echocardiogram:   2D echo with color flow doppler:   Results for orders placed or performed during the hospital encounter of 10/12/18   2D Echo w/ Color Flow Doppler   Result Value Ref Range    QEF 65 55 - 65    Est. PA Systolic Pressure 23.81     Tricuspid Valve Regurgitation MILD     and Transthoracic echo (TTE) complete (Cupid Only): No results found for this or any previous visit.    Assessment and Plan:     Bradycardia    On telemetry appears to be irregular resembling fib however sinus arrest remains on  differential.  Warm on exam with borderline blood pressures and heart rate ranging between 30-60 however isolated instance of very low of 26 noted.  Discussed case with EP and will see response to atropine with a plan for glucagon and/or TVP.   Hold all cornel blockers  Admit to CCU, discussed with CCU fellow         VTE Risk Mitigation (From admission, onward)        Ordered     enoxaparin injection 40 mg  Daily      03/15/19 0847     IP VTE HIGH RISK PATIENT  Once      03/15/19 0847          Thank you for your consult.     Smith Barone DO  Cardiology Fellow, PGY-6    Cardiology   [facility]

## 2019-03-15 NOTE — HPI
64 y/o male with PMH of paroxysmal AF, EtOH abuse (PSYCH offered rehabilitation but declined), HTN, HLD, and elevated LFTs who presents with fatigue/malaise. The patient follows in Dr. Tolentino's cardiology clinic as well as with Dr. Giang. AAD versus ablation was discussed in the latter's clinic. He's on Eliquis and decided to pursue ablation. He Eliquis has been held for a few days for a liver biopsy scheduled for 3/19. Home meds include Lopressor 25 mg PO BID and diltiazem 120 mg qD with diltiazem started 3/4. The patient is on flecainide 150 mg PO BID. He took several doses of breakthrough metoprolol tartrate 25 mg (he thinks three pills in the last 6 hours) on top of his long acting diltiazem and flecainide. The patient also took several additional doses of BB and flecainide over the last three days, according to his history, because he felt that he was having tachycardia. His daughter accompanies him and notes that her father drank two beers yesterday morning. He felt withdrawal with hallucinations yesterday evening, so he consumed seven beers last night. She says he had at least one hallucination before ED admission.     Hgb 13, platelets 114K, Na 125, K 3.2, bicarb 16, AGAP 18, sCr 2 (baseline 1), glucose 172, tBili 1.8, AST 1720, , Troponin 0.015, , alcohol 78, CXR clear. EKG with undetermined severe bradycardia. Atropine was reportedly given by the attending in the ED with brief expected response. Dr. Alem Miller was contacted regarding the need for a TVP. He recommended atropine 1 mg IV, and it was ordered to be given as well as a prn order. He received 500 cc NS bolus. The patient displayed symptoms of EtOH withdrawal including anxiety, diaphoresis, agitation and hallucinations at home and in the ED by history and exam. SpO2 >90% and CXR w/o pulmonary edema. Medications to treat BB poisoning (IV atropine, IV glucagon, IV calcium, IV glucagon, IV fluid) and alcohol withdrawal were ordered.  Potassium was replaced. The patient developed worsening hallucinations and agitation, per nursing report obtained after ROSC, and was combative. Per report after ROSC, his daughter, who is a nurse, was concerned he had a seizure before the patient went into cardiac arrest. The nursing staff called our team to notify us of the patient's cardiac arrest and ACLS was being performed by the ED. ROSC was achieved. A bolus of fluid was given by the ED. A TVP was placed by our service, as the HR was in the 40s, and after discussing with Dr. Lagos. Post-ROSC, his SBP improved with a TVP set at base rate 60 BPM.     ECHO 10/2018    1 - Normal left ventricular systolic function (EF 60-65%).     2 - Biatrial enlargement.     3 - No wall motion abnormalities.     4 - Quantitatively measured LV function is 67%.     5 - Indeterminate LV diastolic function.     6 - Normal right ventricular systolic function .     7 - The estimated PA systolic pressure is greater than 24 mmHg.     8 - Mild tricuspid regurgitation.

## 2019-03-15 NOTE — SUBJECTIVE & OBJECTIVE
Review of Systems   Unable to perform ROS: Intubated       Past Medical History:   Diagnosis Date    A-fib     Alcohol abuse     Arthritis     Chronic gout     Diabetes mellitus     DM (diabetes mellitus) 11/16/2016    Fatty liver     Hyperlipidemia     Renal cell cancer 2014       Past Surgical History:   Procedure Laterality Date    ABSCESS DRAINAGE      perirectal    COLONOSCOPY      FISTULOTOMY N/A 5/19/2015    [facility]    HAND SURGERY Left     HEMORRHOIDECTOMY N/A 5/19/2015    [facility]    KIDNEY SURGERY      partial left kidney removal - [place]    PARTIAL NEPHRECTOMY Left August 2014    ROBOTIC ASSISTED LAPAROSCOPIC NEPHRECTOMY PARTIAL Left 8/28/2014    Performed by Fabian Estevez MD at [facility] OR [address] [place] [address]       Family history of liver disease: Yes    Review of patient's allergies indicates:   Allergen Reactions    No known drug allergies        Tobacco Use    Smoking status: Never Smoker    Smokeless tobacco: Never Used   Substance and Sexual Activity    Alcohol use: Yes     Alcohol/week: 4.2 oz     Types: 7 Cans of beer per week     Comment: patient states he drinks less than he use to    Drug use: No    Sexual activity: Not on file         (Not in a hospital admission)    Objective:     Vital Signs (Most Recent):  Temp: 98 °F (36.7 °C) (03/15/19 0759)  Pulse: 68 (03/15/19 1537)  Resp: (!) 28 (03/15/19 1522)  BP: (!) 96/57 (03/15/19 1537)  SpO2: 100 % (03/15/19 1537) Vital Signs (24h Range):  Temp:  [98 °F (36.7 °C)] 98 °F (36.7 °C)  Pulse:  [] 68  Resp:  [16-31] 28  SpO2:  [93 %-100 %] 100 %  BP: ()/(39-94) 96/57     Weight: 83 kg (183 lb) (03/15/19 1515)  Body mass index is 29.54 kg/m².    Physical Exam   Constitutional: No distress.   HENT:   Head: Normocephalic and atraumatic.   Eyes: No scleral icterus.   Cardiovascular:   Bradycardic   Pulmonary/Chest: Effort normal and breath  sounds normal.   Abdominal: Soft. Bowel sounds are normal.   Musculoskeletal: He exhibits no edema.   Neurological:   intubated   Skin: He is not diaphoretic.       MELD-Na score: 26 at 3/15/2019 11:25 AM  MELD score: 17 at 3/15/2019 11:25 AM  Calculated from:  Serum Creatinine: 2 mg/dL at 3/15/2019  8:37 AM  Serum Sodium: 125 mmol/L at 3/15/2019  8:37 AM  Total Bilirubin: 1.8 mg/dL at 3/15/2019  8:37 AM  INR(ratio): 1.2 at 3/15/2019 11:25 AM  Age: 63 years    Significant Labs:  CBC:   Recent Labs   Lab 03/15/19  1125   WBC 17.23*   RBC 3.73*   HGB 12.8*   HCT 40.5   *     CMP:   Recent Labs   Lab 03/15/19  0837   *   CALCIUM 8.8   ALBUMIN 3.7   PROT 7.1   *   K 3.2*   CO2 16*   CL 91*   BUN 11   CREATININE 2.0*   ALKPHOS 53*   *   AST 1,720*   BILITOT 1.8*     Coagulation:   Recent Labs   Lab 03/15/19  1125   INR 1.2   APTT 23.3       Significant Imaging:  X-Ray: Reviewed

## 2019-03-15 NOTE — ASSESSMENT & PLAN NOTE
- hold BB, CCB and flecainide in the setting of bradycardia  - held NOAC for several days pending liver bx  - IV UFH in lieu of NOAC

## 2019-03-15 NOTE — ASSESSMENT & PLAN NOTE
- consult hepatology with concern for alcoholic hepatitis  - scheduled for liver biopsy this month  - hold statin and fenofibrate  - optimize cardiac profile

## 2019-03-15 NOTE — ED TRIAGE NOTES
Patient received from EMS with complaint of bradycardia.  HR 20 upon EMS arrival.  Given atropine with increase to 33-40.  Patient with report of dizziness, weakness, and nausea this morning.  Took home medications while not feeling well.       Arrived with #18 AC to right AC, with NS infusion 800 cc LTC.     Family/daughter present.     Pain:  Rated 0/10.     Psychosocial:  Patient is calm and cooperative.  Patients insight and judgement are appropriate to situation.  Appears clean, well maintained, with clothing appropriate to environment.  No evidence of delusions, hallucinations, or psychosis.     Neuro:  Eyes open spontaneously.  Awake, alert, oriented x 4.  Speech clear and appropriate.  Tolerating saliva secretions well.  Able to follow commands, demonstrating ability to actively and appropriately communicate within context of current conversation.  Symmetrical facial muscles.  Moving all extremities well with no noted weakness.  Adequate muscle tone present.    Movement is purposeful.  No evidence of impaired sensation.  Responds to external stimuli with appropriate reflexes.  Pupils equally round and reactive to light.  No noted drifts.       Airway:  Bilateral chest rise and fall.  RR regular and non-labored.  Air entry patent and clear x 5 lobes of the lungs.  No crepitus or subcutaneous emphysema noted on palpation.       Circulatory:  Skin warm, dry, and pink.  Apical and radial pulses strong and irregular.  Capillary refill/skin blanching less than 3 seconds to distal of 4 extremities.  A fib ate of 33-40 on CM.     Abdomen:  Abdomen soft and non-distended.  Positive normo-active bowel sounds x 4 quadrants.       Urinary:  Patient denies pain, frequency, or urgency.  Voids independently.  Reports urine appears jessica/yellow in color.     Extremities:  No redness, heat, swelling, deformity, or pain.     Skin:  Intact with no bruising/discolorations noted.

## 2019-03-15 NOTE — SUBJECTIVE & OBJECTIVE
Past Medical History:   Diagnosis Date    A-fib     Alcohol abuse     Arthritis     Chronic gout     Diabetes mellitus     DM (diabetes mellitus) 11/16/2016    Fatty liver     Hyperlipidemia     Renal cell cancer 2014       Past Surgical History:   Procedure Laterality Date    ABSCESS DRAINAGE      perirectal    COLONOSCOPY      FISTULOTOMY N/A 5/19/2015    [facility] at [facility]    HAND SURGERY Left     HEMORRHOIDECTOMY N/A 5/19/2015    [facility]    KIDNEY SURGERY      partial left kidney removal - [place]    PARTIAL NEPHRECTOMY Left August 2014    ROBOTIC ASSISTED LAPAROSCOPIC NEPHRECTOMY PARTIAL Left 8/28/2014    Performed by [facility]stevez [facility] at [facility] OR 2ND [facility]       Review of patient's allergies indicates:   Allergen Reactions    No known drug allergies        No current facility-administered medications on file prior to encounter.      Current Outpatient Medications on File Prior to Encounter   Medication Sig    allopurinol (ZYLOPRIM) 100 MG tablet TAKE 2 TABLETS BY MOUTH DAILY    apixaban (ELIQUIS) 5 mg Tab Take 1 tablet (5 mg total) by mouth 2 (two) times daily.    diltiaZEM (DILACOR XR) 120 MG CDCR Take 1 capsule (120 mg total) by mouth once daily.    fenofibrate 160 MG Tab Take 1 tablet (160 mg total) by mouth every evening.    flecainide (TAMBOCOR) 150 MG Tab Take 1 tablet (150 mg total) by mouth 2 (two) times daily.    LORazepam (ATIVAN) 0.5 MG tablet TAKE 1 TABLET BY MOUTH EVERY 12 HOURS AS NEEDED FOR ANXIETY    metoprolol tartrate (LOPRESSOR) 25 MG tablet Take 1 tablet (25 mg total) by mouth 2 (two) times daily as needed.    omega-3 acid ethyl esters (LOVAZA) 1 gram capsule Take 2 g by mouth 2 (two) times daily.    ranitidine (ZANTAC) 150 MG capsule Take 150 mg by mouth 2 (two) times daily.    rosuvastatin (CRESTOR) 10 MG tablet Take 1 tablet (10 mg total) by mouth once daily.    aspirin 81 MG Chew  Take 1 tablet (81 mg total) by mouth once daily.    venlafaxine (EFFEXOR) 37.5 MG Tab Take 1 tablet (37.5 mg total) by mouth 2 (two) times daily.     Family History     Problem Relation (Age of Onset)    Cancer Father    Colon polyps Brother    Diabetes Mother    Lung cancer Father, Sister        Tobacco Use    Smoking status: Never Smoker    Smokeless tobacco: Never Used   Substance and Sexual Activity    Alcohol use: Yes     Alcohol/week: 4.2 oz     Types: 7 Cans of beer per week     Comment: patient states he drinks less than he use to    Drug use: No    Sexual activity: Not on file     Review of Systems   Constitution: Negative for chills and fever.   HENT: Negative for ear discharge.    Eyes: Negative for pain and visual disturbance.   Cardiovascular: Positive for palpitations. Negative for chest pain, dyspnea on exertion, irregular heartbeat, leg swelling, orthopnea, paroxysmal nocturnal dyspnea and syncope.   Respiratory: Negative for hemoptysis, shortness of breath and wheezing.    Skin: Negative for rash and suspicious lesions.   Musculoskeletal: Negative for joint pain and muscle weakness.   Gastrointestinal: Negative for abdominal pain, diarrhea, nausea and vomiting.   Genitourinary: Negative for dysuria and frequency.   Neurological: Negative for focal weakness, headaches and tremors.     Objective:     Vital Signs (Most Recent):  Temp: 98 °F (36.7 °C) (03/15/19 0759)  Pulse: (!) 40 (03/15/19 0917)  Resp: 16 (03/15/19 0759)  BP: (!) 79/47 (03/15/19 0917)  SpO2: 95 % (03/15/19 0917) Vital Signs (24h Range):  Temp:  [98 °F (36.7 °C)] 98 °F (36.7 °C)  Pulse:  [33-54] 40  Resp:  [16] 16  SpO2:  [95 %-98 %] 95 %  BP: (75-80)/(40-47) 79/47        There is no height or weight on file to calculate BMI.    SpO2: 95 %  O2 Device (Oxygen Therapy): nasal cannula      Intake/Output Summary (Last 24 hours) at 3/15/2019 1006  Last data filed at 3/15/2019 0845  Gross per 24 hour   Intake 500 ml   Output --   Net  500 ml       Lines/Drains/Airways     Drain                 Closed/Suction Drain 08/28/14 1452 Left;Lateral Abdomen Bulb 10 Fr. 1659 days                Physical Exam   Constitutional: He is oriented to person, place, and time. He appears well-developed and well-nourished.   HENT:   Head: Normocephalic and atraumatic.   Eyes: EOM are normal.   Cardiovascular: An irregularly irregular rhythm present. Bradycardia present. Exam reveals no gallop and no friction rub.   Pulmonary/Chest: Effort normal and breath sounds normal. No stridor. He has no wheezes. He has no rales.   Abdominal: Soft. Bowel sounds are normal. There is no rebound and no guarding.   Musculoskeletal: He exhibits no edema.   Neurological: He is alert and oriented to person, place, and time. No cranial nerve deficit.   Skin: Skin is warm and dry.   Psychiatric: He has a normal mood and affect. His behavior is normal.       Significant Labs:   BMP:   Recent Labs   Lab 03/15/19  0837   *   *   K 3.2*   CL 91*   CO2 16*   BUN 11   CREATININE 2.0*   CALCIUM 8.8   , CMP   Recent Labs   Lab 03/15/19  0837   *   K 3.2*   CL 91*   CO2 16*   *   BUN 11   CREATININE 2.0*   CALCIUM 8.8   PROT 7.1   ALBUMIN 3.7   BILITOT 1.8*   ALKPHOS 53*   AST 1,720*   *   ANIONGAP 18*   ESTGFRAFRICA 39.6*   EGFRNONAA 34.3*   , CBC   Recent Labs   Lab 03/15/19  0837   WBC 8.90   HGB 13.0*   HCT 39.4*   *   , INR No results for input(s): INR, PROTIME in the last 48 hours., Lipid Panel No results for input(s): CHOL, HDL, LDLCALC, TRIG, CHOLHDL in the last 48 hours. and Troponin   Recent Labs   Lab 03/15/19  0837   TROPONINI 0.015

## 2019-03-15 NOTE — ASSESSMENT & PLAN NOTE
- not explained by hyperglycemia and not on diuretics  - in the setting of JASS; ED administered isotonic fluid  - continue to monitor

## 2019-03-15 NOTE — ASSESSMENT & PLAN NOTE
On telemetry appears to be irregular resembling fib however sinus arrest remains on differential.  Warm on exam with borderline blood pressures and heart rate ranging between 30-60 however isolated instance of very low of 26 noted.  Discussed case with EP and will see response to atropine with a plan for glucagon and/or TVP.   Hold all cornel blockers  Admit to CCU, discussed with CCU fellow

## 2019-03-15 NOTE — ASSESSMENT & PLAN NOTE
- prominent features include hypotension and bradycardia  - ECG with bradydysrhythmia  - give atropine 1 mg IV (up to 3 doses) prn  - give IV glucagon (bolus followed by gtt) and IV calcium  - check magnesium  - discussed TVP with Dr. Alem Miller: will try conservative management first  - f/u TTE with CFD  - hold BB, CCB and flecainide

## 2019-03-15 NOTE — ASSESSMENT & PLAN NOTE
63 year old male with a history of HTN, HLD, PAF, DM Type 2, Alcohol abuse with hepatic steatosis on who Hepatology is being consulted for elevated LFT's. Based on the history from the daughter it seems that within the last week he was actually trying to decrease his alcohol intake leading him to feel bad and take additional BB in an effort to control palpitations. With this information in mind as well as the initial pattern of elevation his elevated LFT's were likely secondary to hypotension/ischemia. Now that he is status post CRP would expect to see acute worsening of his LFT's due to further ischemia. Of note patient's daughter brought up liver biopsy as well as possible rehab. In his current situation he is too sick for either to pay a role in his care however if/when he improves he can be seen by Addiction psych here and we would likely reschedule liver biopsy as an outpatient.     Recommendations:  --Daily CMP, CBC, and INR  --Obtain CPK  --Obtain blood cultures, UA, and CXR  --Start empiric antibiotics  --Supportive care by ICU team

## 2019-03-15 NOTE — ED PROVIDER NOTES
Encounter Date: 3/15/2019    SCRIBE #1 NOTE: I, Raudel Long, am scribing for, and in the presence of, Dr. Fox. Other sections scribed: HPI, ROS, MDM, PE.       History     Chief Complaint   Patient presents with    Bradycardia     Patient woke with a heart rate of 20 with nausea and dizziness.  Patient states 3-4 days of general illness.     Time patient was seen by the provider: 8:25 AM      The patient is a 63 y.o. male with co-morbidities including: DM, HLD, alcohol abuse, AFIB who presents to the ED with a complaint of bradycardia. Pt reports onset of low heart rate of 20 bpm this morning when he woke up. Pt states for the last 3-4 days he has been feeling generally ill. States he cannot walk far without experiencing SOB, sweating, and feeling near syncope. Pt states his symptoms were worse this morning. States on the ambulance ride he felt CP but received medication and it was resolved. Wife reports for the last 2 days the pt has drank an entire case of beer each day. Pt denies dysuria, abdominal pain, N/V/D.  EMS gave atropine 1mg in route.  A ten point review of systems was completed and is negative except as documented above.  Patient denies any other acute medical complaint.  The patients available PMH, PSH, Social History, medications, allergies, and triage vital signs were reviewed just prior to their medical evaluation.      The history is provided by the patient.     Review of patient's allergies indicates:   Allergen Reactions    No known drug allergies      Past Medical History:   Diagnosis Date    A-fib     Alcohol abuse     Arthritis     Chronic gout     Diabetes mellitus     DM (diabetes mellitus) 11/16/2016    Fatty liver     Hyperlipidemia     Renal cell cancer 2014     Past Surgical History:   Procedure Laterality Date    ABSCESS DRAINAGE      perirectal    COLONOSCOPY      FISTULOTOMY N/A 5/19/2015    Performed [facility] at [facility] OR [address] [place] [address]    HAND SURGERY  Left     HEMORRHOIDECTOMY N/A 5/19/2015    Performed by Shane Hughes MD at [facility] OR 2ND FLR    KIDNEY SURGERY      partial left kidney removal - [place]    PARTIAL NEPHRECTOMY Left August 2014    ROBOTIC ASSISTED LAPAROSCOPIC NEPHRECTOMY PARTIAL Left 8/28/2014    Performed by Fabian Estevez MD at [facility] OR 2ND [facility]LR     Family History   Problem Relation Age of Onset    Lung cancer Father     Cancer Father     Diabetes Mother     Lung cancer Sister     Colon polyps Brother     Cirrhosis Neg Hx      Social History     Tobacco Use    Smoking status: Never Smoker    Smokeless tobacco: Never Used   Substance Use Topics    Alcohol use: Yes     Alcohol/week: 4.2 oz     Types: 7 Cans of beer per week     Comment: patient states he drinks less than he use to    Drug use: No     Review of Systems   Constitutional: Negative for chills and fever.   HENT: Negative for sore throat.    Eyes: Negative for visual disturbance.   Respiratory: Positive for shortness of breath. Negative for cough.    Cardiovascular: Positive for chest pain.   Gastrointestinal: Negative for abdominal pain, diarrhea, nausea and vomiting.   Genitourinary: Negative for dysuria.   Musculoskeletal: Negative for neck pain.   Skin: Negative for rash.   Neurological: Positive for dizziness. Negative for syncope.       Physical Exam     Initial Vitals [03/15/19 0759]   BP Pulse Resp Temp SpO2   (!) 80/40 (!) 33 16 98 °F (36.7 °C) 98 %      MAP       --         Physical Exam    Nursing note and vitals reviewed.  Constitutional: He appears well-developed and well-nourished. No distress.   HENT:   Head: Normocephalic and atraumatic.   Nose: Nose normal.   Eyes: Conjunctivae are normal. Right eye exhibits no discharge. Left eye exhibits no discharge.   Neck: Normal range of motion. Neck supple.   nontender   Cardiovascular: Normal heart sounds and intact distal pulses. Exam reveals no gallop and no friction rub.    No murmur heard.  Bradycardia,  irregular   Pulmonary/Chest: Breath sounds normal. No respiratory distress. He has no wheezes. He has no rhonchi. He has no rales.   Abdominal: Soft. He exhibits no distension. There is no tenderness. There is no rebound and no guarding.   Musculoskeletal: Normal range of motion. He exhibits no edema or tenderness.   Neurological: He is alert and oriented to person, place, and time. He has normal strength. GCS score is 15. GCS eye subscore is 4. GCS verbal subscore is 5. GCS motor subscore is 6.   Skin: Skin is warm and dry. No rash noted. No erythema.   Psychiatric: He has a normal mood and affect. His behavior is normal. Judgment and thought content normal.         ED Course   Procedures  Labs Reviewed   CBC W/ AUTO DIFFERENTIAL   COMPREHENSIVE METABOLIC PANEL   TROPONIN I   B-TYPE NATRIURETIC PEPTIDE   ALCOHOL,MEDICAL (ETHANOL)     EKG Readings: (Independently Interpreted)   Initial Reading: No STEMI. Heart Rate: 46. Ectopy: No Ectopy. T Waves Flipped: AVF.   Bradycardia, junctional afib     ECG Results          EKG 12-lead (In process)  Result time 03/15/19 08:24:58    In process by Interface, Lab In [facility] (03/15/19 08:24:58)                 Narrative:    Test Reason : (Not Selected)    Vent. Rate : 046 BPM     Atrial Rate : 027 BPM     P-R Int : 000 ms          QRS Dur : 122 ms      QT Int : 584 ms       P-R-T Axes : 000 036 039 degrees     QTc Int : 511 ms    Undetermined rhythm  Nonspecific intraventricular conduction delay  Nonspecific T wave abnormality  Abnormal ECG  When compared with ECG of 04-MAR-2019 09:25,  Current undetermined rhythm precludes rhythm comparison, needs review  QRS duration has increased  QT has lengthened    Referred By: System System           Confirmed By:                             Imaging Results          X-Ray Chest AP Portable (In process)               X-Rays:   Independently Interpreted Readings:   Other Readings:  Reviewed CXR images    Medical Decision Making:   History:    I obtained history from: someone other than patient.  Independently Interpreted Test(s):   I have ordered and independently interpreted X-rays - see prior notes.  I have ordered and independently interpreted EKG Reading(s) - see prior notes  Clinical Tests:   Lab Tests: Ordered and Reviewed  Radiological Study: Ordered and Reviewed  Medical Tests: Ordered and Reviewed  ED Management:  63-year-old male with symptomatic bradycardia.  Vitals confirm bradycardia as well as hypotension.  Physical exam as above.  Normal mental status.  EKG as above.  Discussed with Cardiology who evaluated bedside from admit to the ICU.  Given atropine IV.  Will consider glucagon, but patient is stable at bedside.  Sick sinus syndrome, alcoholic cardiomyopathy, and beta-blocker overdose are all considerations.  No indication for intraventricular pacemaker at this time.  Did bedside teaching.  All questions answered.  Patient acknowledges understanding.  Other:   I have discussed this case with another health care provider.       <> Summary of the Discussion: cards            Scribe Attestation:   Scribe #1: I performed the above scribed service and the documentation accurately describes the services I performed. I attest to the accuracy of the note.    Attending Attestation:         Attending Critical Care:   Critical Care Times:   Direct Patient Care (initial evaluation, reassessments, and time considering the case)................................................................15 minutes.   Additional History from reviewing old medical records or taking additional history from the family, EMS, PCP, etc.......................5 minutes.   Ordering, Reviewing, and Interpreting Diagnostic Studies...............................................................................................................5 minutes.    Documentation..................................................................................................................................................................................5 minutes.   Consultation with other Physicians. .................................................................................................................................................5 minutes.   ==============================================================  · Total Critical Care Time - exclusive of procedural time: 35 minutes.  ==============================================================  Critical care reasons: symptomatic bradycardia.   Critical care was time spent personally by me on the following activities: obtaining history from patient or relative, examination of patient, ordering lab, x-rays, and/or EKG, development of treatment plan with patient or relative, ordering and performing treatments and interventions, evaluation of patient's response to treatment, discussion with consultants, interpretation of cardiac measurements and re-evaluation of patient's conition.   Critical Care Condition: potentially life-threatening                  Clinical Impression:       ICD-10-CM ICD-9-CM   1. Bradycardia R00.1 427.89   2. Chest pain R07.9 786.50   3. Alcoholism /alcohol abuse F10.20 303.90         Disposition:   Disposition: Admitted  Condition: Serious                        Zuhair Fox MD  03/15/19 1102

## 2019-03-15 NOTE — ASSESSMENT & PLAN NOTE
- AGAP present prior to cardiac arrest  - lactic acid after arrest is 8.1  - ED has administered more isotonic fluid  - optimize cardiac profile  - obtain BCx x2, UA with reflex UCx  - administer broad spectrum antibiotics

## 2019-03-15 NOTE — CONSULTS
[facility]  Hepatology  Consult Note    Patient Name: Donald Warren Abadie  MRN: [id number]  Admission Date: 3/15/2019  Hospital Length of Stay: 0 days  Attending Provider: No att. providers found   Primary Care Physician: Bacilio Larry MD  Principal Problem:<principal problem not specified>    Inpatient consult to Hepatology  Consult performed by: Thomas Powell MD  Consult ordered by: Rod Hogan MD        Subjective:   HPI:  63 year old male with a history of HTN, HLD, PAF, DM Type 2, Alcohol abuse with hepatic steatosis on who Hepatology is being consulted for elevated LFT's.    Patient known to Hepatology as he was following in clinic for NAFLD possibly secondary to diabetes vs alcohol with plans to obtain a liver biopsy in order to determine degree of fibrosis.  Per daughter he drinks 1 case of beer per day. In the last week however was trying to cut back. Yesterday he had 7 days and earlier in the week had another 2 beers. She thinks that he could have been withdrawing and took additional heart medication in hopes of feeling better. She states that today she was extremely short of breath, diaphoretic with a pulse of 20 when EMS arrived to pick him up. In the ED he was hypotensive and bradycardic. Labs were significant for hyponatremia, JASS, elevated LFT's, thrombocytopenia, and normal INR. He received atropine, calcium, and glucagon. Unfortunately he coded, ROSC was achieved but he remains intubated with a trans-venous pacer.     Review of Systems   Unable to perform ROS: Intubated       Past Medical History:   Diagnosis Date    A-fib     Alcohol abuse     Arthritis     Chronic gout     Diabetes mellitus     DM (diabetes mellitus) 11/16/2016    Fatty liver     Hyperlipidemia     Renal cell cancer 2014       Past Surgical History:   Procedure Laterality Date    ABSCESS DRAINAGE      perirectal    COLONOSCOPY      FISTULOTOMY N/A 5/19/2015    Performed by Shane Hughes MD  at [facility] OR [address] [place] [address]    HAND SURGERY Left     HEMORRHOIDECTOMY N/A 5/19/2015    Performed by Shane Hughes MD at [facility] OR [address] [place] [address]    KIDNEY SURGERY      partial left kidney removal - [place]    PARTIAL NEPHRECTOMY Left August 2014    ROBOTIC ASSISTED LAPAROSCOPIC NEPHRECTOMY PARTIAL Left 8/28/2014    Performed by Fabian Estevez MD at [facility] OR [address] [place] [address]       Family history of liver disease: Yes    Review of patient's allergies indicates:   Allergen Reactions    No known drug allergies        Tobacco Use    Smoking status: Never Smoker    Smokeless tobacco: Never Used   Substance and Sexual Activity    Alcohol use: Yes     Alcohol/week: 4.2 oz     Types: 7 Cans of beer per week     Comment: patient states he drinks less than he use to    Drug use: No    Sexual activity: Not on file         (Not in a hospital admission)    Objective:     Vital Signs (Most Recent):  Temp: 98 °F (36.7 °C) (03/15/19 0759)  Pulse: 68 (03/15/19 1537)  Resp: (!) 28 (03/15/19 1522)  BP: (!) 96/57 (03/15/19 1537)  SpO2: 100 % (03/15/19 1537) Vital Signs (24h Range):  Temp:  [98 °F (36.7 °C)] 98 °F (36.7 °C)  Pulse:  [] 68  Resp:  [16-31] 28  SpO2:  [93 %-100 %] 100 %  BP: ()/(39-94) 96/57     Weight: 83 kg (183 lb) (03/15/19 1515)  Body mass index is 29.54 kg/m².    Physical Exam   Constitutional: No distress.   HENT:   Head: Normocephalic and atraumatic.   Eyes: No scleral icterus.   Cardiovascular:   Bradycardic   Pulmonary/Chest: Effort normal and breath sounds normal.   Abdominal: Soft. Bowel sounds are normal.   Musculoskeletal: He exhibits no edema.   Neurological:   intubated   Skin: He is not diaphoretic.       MELD-Na score: 26 at 3/15/2019 11:25 AM  MELD score: 17 at 3/15/2019 11:25 AM  Calculated from:  Serum Creatinine: 2 mg/dL at 3/15/2019  8:37 AM  Serum Sodium: 125 mmol/L at 3/15/2019  8:37 AM  Total Bilirubin: 1.8 mg/dL at 3/15/2019  8:37 AM  INR(ratio): 1.2 at 3/15/2019 11:25 AM  Age: 63  years    Significant Labs:  CBC:   Recent Labs   Lab 03/15/19  1125   WBC 17.23*   RBC 3.73*   HGB 12.8*   HCT 40.5   *     CMP:   Recent Labs   Lab 03/15/19  0837   *   CALCIUM 8.8   ALBUMIN 3.7   PROT 7.1   *   K 3.2*   CO2 16*   CL 91*   BUN 11   CREATININE 2.0*   ALKPHOS 53*   *   AST 1,720*   BILITOT 1.8*     Coagulation:   Recent Labs   Lab 03/15/19  1125   INR 1.2   APTT 23.3       Significant Imaging:  X-Ray: Reviewed    Assessment/Plan:     Elevated LFTs    63 year old male with a history of HTN, HLD, PAF, DM Type 2, Alcohol abuse with hepatic steatosis on who Hepatology is being consulted for elevated LFT's. Based on the history from the daughter it seems that within the last week he was actually trying to decrease his alcohol intake leading him to feel bad and take additional BB in an effort to control palpitations. With this information in mind as well as the initial pattern of elevation his elevated LFT's were likely secondary to hypotension/ischemia. Now that he is status post CRP would expect to see acute worsening of his LFT's due to further ischemia. Of note patient's daughter brought up liver biopsy as well as possible rehab. In his current situation he is too sick for either to pay a role in his care however if/when he improves he can be seen by Addiction psych here and we would likely reschedule liver biopsy as an outpatient.     Recommendations:  --Daily CMP, CBC, and INR  --Obtain CPK  --Obtain blood cultures, UA, and CXR  --Start empiric antibiotics  --Supportive care by ICU team           Thank you for your consult. I will follow-up with patient. Please contact us if you have any additional questions.    Thomas Powell M.D.  Gastroenterology Fellow, PGY-V  Pager: [phone]  [facility]

## 2019-03-15 NOTE — SUBJECTIVE & OBJECTIVE
Past Medical History:   Diagnosis Date    A-fib     Alcohol abuse     Arthritis     Chronic gout     Diabetes mellitus     DM (diabetes mellitus) 11/16/2016    Fatty liver     Hyperlipidemia     Renal cell cancer 2014       Past Surgical History:   Procedure Laterality Date    ABSCESS DRAINAGE      perirectal    COLONOSCOPY      FISTULOTOMY N/A 5/19/2015    Performed by Shane Hughes MD at [facility] OR 2ND [facility]LR    HAND SURGERY Left     HEMORRHOIDECTOMY N/A 5/19/2015    Performed by Shane Hughes MD at [facility] 2[facility]    KIDNEY SURGERY      partial left kidney removal - [place]    PARTIAL NEPHRECTOMY Left August 2014    ROBOTIC ASSISTED LAPAROSCOPIC NEPHRECTOMY PARTIAL Left 8/28/2014    Performed by Fabian Estevez MD at [facility] OR 2ND [facility]LR       Review of patient's allergies indicates:   Allergen Reactions    No known drug allergies        No current facility-administered medications on file prior to encounter.      Current Outpatient Medications on File Prior to Encounter   Medication Sig    allopurinol (ZYLOPRIM) 100 MG tablet TAKE 2 TABLETS BY MOUTH DAILY    apixaban (ELIQUIS) 5 mg Tab Take 1 tablet (5 mg total) by mouth 2 (two) times daily.    diltiaZEM (DILACOR XR) 120 MG CDCR Take 1 capsule (120 mg total) by mouth once daily.    fenofibrate 160 MG Tab Take 1 tablet (160 mg total) by mouth every evening.    flecainide (TAMBOCOR) 150 MG Tab Take 1 tablet (150 mg total) by mouth 2 (two) times daily.    LORazepam (ATIVAN) 0.5 MG tablet TAKE 1 TABLET BY MOUTH EVERY 12 HOURS AS NEEDED FOR ANXIETY    metoprolol tartrate (LOPRESSOR) 25 MG tablet Take 1 tablet (25 mg total) by mouth 2 (two) times daily as needed.    omega-3 acid ethyl esters (LOVAZA) 1 gram capsule Take 2 g by mouth 2 (two) times daily.    ranitidine (ZANTAC) 150 MG capsule Take 150 mg by mouth 2 (two) times daily.    rosuvastatin (CRESTOR) 10 MG tablet Take 1 tablet (10 mg total) by mouth once daily.    aspirin 81 MG Chew  Take 1 tablet (81 mg total) by mouth once daily.    venlafaxine (EFFEXOR) 37.5 MG Tab Take 1 tablet (37.5 mg total) by mouth 2 (two) times daily.     Family History     Problem Relation (Age of Onset)    Cancer Father    Colon polyps Brother    Diabetes Mother    Lung cancer Father, Sister        Tobacco Use    Smoking status: Never Smoker    Smokeless tobacco: Never Used   Substance and Sexual Activity    Alcohol use: Yes     Alcohol/week: 4.2 oz     Types: 7 Cans of beer per week     Comment: patient states he drinks less than he use to    Drug use: No    Sexual activity: Not on file     Review of Systems   Constitution: Positive for weakness and malaise/fatigue. Negative for chills, diaphoresis, fever and weight loss.   HENT: Negative for congestion, hoarse voice and sore throat.    Eyes: Negative for blurred vision and double vision.   Cardiovascular: Positive for irregular heartbeat. Negative for chest pain, dyspnea on exertion, near-syncope and syncope.   Respiratory: Negative for shortness of breath.    Endocrine: Negative for cold intolerance and heat intolerance.   Hematologic/Lymphatic: Does not bruise/bleed easily.   Gastrointestinal: Negative for abdominal pain, constipation, diarrhea, nausea and vomiting.   Genitourinary: Negative for dysuria.   Neurological: Positive for difficulty with concentration and disturbances in coordination. Negative for focal weakness and sensory change.   Psychiatric/Behavioral: The patient is nervous/anxious.      Objective:     Vital Signs (Most Recent):  Temp: 98 °F (36.7 °C) (03/15/19 0759)  Pulse: (!) 40 (03/15/19 0917)  Resp: 16 (03/15/19 0759)  BP: (!) 79/47 (03/15/19 0917)  SpO2: 95 % (03/15/19 0917) Vital Signs (24h Range):  Temp:  [98 °F (36.7 °C)] 98 °F (36.7 °C)  Pulse:  [33-54] 40  Resp:  [16] 16  SpO2:  [95 %-98 %] 95 %  BP: (75-80)/(40-47) 79/47        There is no height or weight on file to calculate BMI.    SpO2: 95 %  O2 Device (Oxygen Therapy): nasal  cannula    No intake or output data in the 24 hours ending 03/15/19 0923    Lines/Drains/Airways     Drain                 Closed/Suction Drain 08/28/14 1452 Left;Lateral Abdomen Bulb 10 Fr. 1659 days                Physical Exam   Constitutional: He is oriented to person, place, and time. He appears well-developed and well-nourished.   HENT:   Head: Normocephalic and atraumatic.   Eyes: EOM are normal.   Neck: No JVD present.   Cardiovascular: An irregular rhythm present. Bradycardia present. Exam reveals no gallop and no friction rub.   No murmur heard.  Pulmonary/Chest: Effort normal and breath sounds normal. No respiratory distress. He has no wheezes.   Abdominal: Soft. Bowel sounds are normal. He exhibits no distension.   Musculoskeletal: He exhibits no edema.   Neurological: He is alert and oriented to person, place, and time.   Skin: Skin is warm and dry.   Psychiatric: He has a normal mood and affect.       Significant Labs:   No results for input(s): NA, K, CL, CO2, BUN, CREATININE, GLUCOSE, ANIONGAP in the last 168 hours.  No results for input(s): AST, ALT, ALKPHOS, BILITOT, BILIDIR, ALBUMIN in the last 168 hours.  Recent Labs   Lab 03/15/19  0837   *    Recent Labs   Lab 03/15/19  0837   WBC 8.90   HGB 13.0*   HCT 39.4*   *   GRAN 50.3  4.5     No results for input(s): PTT, INR in the last 168 hours.  Lab Results   Component Value Date    CHOL 97 (L) 01/17/2019    HDL 27 (L) 01/17/2019    LDLCALC 11.2 (L) 01/17/2019    TRIG 294 (H) 01/17/2019     Lab Results   Component Value Date    HGBA1C 7.8 (H) 01/17/2019          Significant Imaging: Echocardiogram:   2D echo with color flow doppler:   Results for orders placed or performed during the hospital encounter of 10/12/18   2D Echo w/ Color Flow Doppler   Result Value Ref Range    QEF 65 55 - 65    Est. PA Systolic Pressure 23.81     Tricuspid Valve Regurgitation MILD     and Transthoracic echo (TTE) complete (Cupid Only): No results found  for this or any previous visit.

## 2019-03-15 NOTE — PROGRESS NOTES
Pt coded in ER bed 16, CPR started and continued on the way to bed 1. Got pulse back and pt was intubated with a 7.5 ETT secured 23cm at the teeth. Pt was placed on the mechanical vent at the documented settings. Will continue to monitor.

## 2019-03-15 NOTE — PLAN OF CARE
Plan of Care Note:    Evaluated patient at bedside, appears to be in afib with a slow ventricular rate with ocasional junctional beats. He is warm on my exam and hemodynamically stable with BP in the 80s. Will admit to CCU at this time. Labs are pending at this time. If any decompensation in HR will plan for TVP placement however initial impression is that this is overmedication in the setting of long acting diltiazem and use of metoprolol concomitantly.     Smith Barone DO  Cardiology Fellow, PGY-6

## 2019-03-15 NOTE — HPI
63 year old male with a history of HTN, HLD, PAF, DM Type 2, Alcohol abuse with hepatic steatosis on who Hepatology is being consulted for elevated LFT's.    Patient known to Hepatology as he was following in clinic for NAFLD possibly secondary to diabetes vs alcohol with plans to obtain a liver biopsy in order to determine degree of fibrosis.  Per daughter he drinks 1 case of beer per day. In the last week however was trying to cut back. Yesterday he had 7 days and earlier in the week had another 2 beers. She thinks that he could have been withdrawing and took additional heart medication in hopes of feeling better. She states that today she was extremely short of breath, diaphoretic with a pulse of 20 when EMS arrived to pick him up. In the ED he was hypotensive and bradycardic. Labs were significant for hyponatremia, JASS, elevated LFT's, thrombocytopenia, and normal INR. He received atropine, calcium, and glucagon. Unfortunately he coded, ROSC was achieved but he remains intubated with a trans-venous pacer.

## 2019-03-15 NOTE — ASSESSMENT & PLAN NOTE
- obtain TTE with CFD  - cover with broad spectrum abx for now (leukocytosis post-ROSC)  - maintain K>4 and Mg>2  - obtain EEG   - treat BB poisoning

## 2019-03-15 NOTE — H&P
[facility]  Cardiology  History and Physical     Patient Name: Donald Warren Abadie  MRN: [id number]  Admission Date: 3/15/2019  Code Status: Full Code   Attending Provider: Zuhair Fox MD   Primary Care Physician: Bacilio Larry MD  Principal Problem:<principal problem not specified>    Patient information was obtained from patient and relative(s).     Subjective:     Chief Complaint:  Fatigue    HPI:  62 y/o male with PMH of paroxysmal AF, EtOH abuse (PSYCH offered rehabilitation but declined), HTN, HLD, and elevated LFTs who presents with fatigue/malaise. The patient follows in Dr. Tolentino's cardiology clinic as well as with Dr. Giang. AAD versus ablation was discussed in the latter's clinic. He's on Eliquis and decided to pursue ablation. He Eliquis has been held for a few days for a liver biopsy scheduled for 3/19. Home meds include Lopressor 25 mg PO BID and diltiazem 120 mg qD with diltiazem started 3/4. The patient is on flecainide 150 mg PO BID. He took several doses of breakthrough metoprolol tartrate 25 mg (he thinks three pills in the last 6 hours) on top of his long acting diltiazem and flecainide. The patient also took several additional doses of BB and flecainide over the last three days, according to his history, because he felt that he was having tachycardia. His daughter accompanies him and notes that her father drank two beers yesterday morning. He felt withdrawal with hallucinations yesterday evening, so he consumed seven beers last night. She says he had at least one hallucination before ED admission.     Hgb 13, platelets 114K, Na 125, K 3.2, bicarb 16, AGAP 18, sCr 2 (baseline 1), glucose 172, tBili 1.8, AST 1720, , Troponin 0.015, , alcohol 78, CXR clear. EKG with undetermined severe bradycardia. Atropine was reportedly given by the attending in the ED with brief expected response. Dr. Alem Miller was contacted regarding the need for a TVP. He recommended  atropine 1 mg IV, and it was ordered to be given as well as a prn order. He received 500 cc NS bolus. The patient displayed symptoms of EtOH withdrawal including anxiety, diaphoresis, agitation and hallucinations at home and in the ED by history and exam. SpO2 >90% and CXR w/o pulmonary edema. Medications to treat BB poisoning (IV atropine, IV glucagon, IV calcium, IV glucagon, IV fluid) and alcohol withdrawal were ordered. Potassium was replaced. The patient developed worsening hallucinations and agitation, per nursing report obtained after ROSC, and was combative. Per report after ROSC, his daughter, who is a nurse, was concerned he had a seizure before the patient went into cardiac arrest. The nursing staff called our team to notify us of the patient's cardiac arrest and ACLS was being performed by the ED. ROSC was achieved. A bolus of fluid was given by the ED. A TVP was placed by our service, as the HR was in the 40s, and after discussing with Dr. Lagos. Post-ROSC, his SBP improved with a TVP set at base rate 60 BPM.     ECHO 10/2018    1 - Normal left ventricular systolic function (EF 60-65%).     2 - Biatrial enlargement.     3 - No wall motion abnormalities.     4 - Quantitatively measured LV function is 67%.     5 - Indeterminate LV diastolic function.     6 - Normal right ventricular systolic function .     7 - The estimated PA systolic pressure is greater than 24 mmHg.     8 - Mild tricuspid regurgitation.     Past Medical History:   Diagnosis Date    A-fib     Alcohol abuse     Arthritis     Chronic gout     Diabetes mellitus     DM (diabetes mellitus) 11/16/2016    Fatty liver     Hyperlipidemia     Renal cell cancer 2014       Past Surgical History:   Procedure Laterality Date    ABSCESS DRAINAGE      perirectal    COLONOSCOPY      FISTULOTOMY N/A 5/19/2015    Performed by Shane [facility] at [facility] OR [address] [place] [address]    HAND SURGERY Left     HEMORRHOIDECTOMY N/A 5/19/2015    Performed by  Shane Hughes MD at [facility] OR [address] [place] [address]    KIDNEY SURGERY      partial left kidney removal - [place]    PARTIAL NEPHRECTOMY Left August 2014    ROBOTIC ASSISTED LAPAROSCOPIC NEPHRECTOMY PARTIAL Left 8/28/2014    Performed by Fabian Estevez MD at [facility] OR [address] [place] [address]       Review of patient's allergies indicates:   Allergen Reactions    No known drug allergies        No current facility-administered medications on file prior to encounter.      Current Outpatient Medications on File Prior to Encounter   Medication Sig    allopurinol (ZYLOPRIM) 100 MG tablet TAKE 2 TABLETS BY MOUTH DAILY    apixaban (ELIQUIS) 5 mg Tab Take 1 tablet (5 mg total) by mouth 2 (two) times daily.    diltiaZEM (DILACOR XR) 120 MG CDCR Take 1 capsule (120 mg total) by mouth once daily.    fenofibrate 160 MG Tab Take 1 tablet (160 mg total) by mouth every evening.    flecainide (TAMBOCOR) 150 MG Tab Take 1 tablet (150 mg total) by mouth 2 (two) times daily.    LORazepam (ATIVAN) 0.5 MG tablet TAKE 1 TABLET BY MOUTH EVERY 12 HOURS AS NEEDED FOR ANXIETY    metoprolol tartrate (LOPRESSOR) 25 MG tablet Take 1 tablet (25 mg total) by mouth 2 (two) times daily as needed.    omega-3 acid ethyl esters (LOVAZA) 1 gram capsule Take 2 g by mouth 2 (two) times daily.    ranitidine (ZANTAC) 150 MG capsule Take 150 mg by mouth 2 (two) times daily.    rosuvastatin (CRESTOR) 10 MG tablet Take 1 tablet (10 mg total) by mouth once daily.    aspirin 81 MG Chew Take 1 tablet (81 mg total) by mouth once daily.    venlafaxine (EFFEXOR) 37.5 MG Tab Take 1 tablet (37.5 mg total) by mouth 2 (two) times daily.     Family History     Problem Relation (Age of Onset)    Cancer Father    Colon polyps Brother    Diabetes Mother    Lung cancer Father, Sister        Tobacco Use    Smoking status: Never Smoker    Smokeless tobacco: Never Used   Substance and Sexual Activity    Alcohol use: Yes     Alcohol/week: 4.2 oz     Types: 7 Cans of beer per week      Comment: patient states he drinks less than he use to    Drug use: No    Sexual activity: Not on file     Review of Systems   Constitution: Negative for chills and fever.   HENT: Negative for ear discharge.    Eyes: Negative for pain and visual disturbance.   Cardiovascular: Positive for palpitations. Negative for chest pain, dyspnea on exertion, irregular heartbeat, leg swelling, orthopnea, paroxysmal nocturnal dyspnea and syncope.   Respiratory: Negative for hemoptysis, shortness of breath and wheezing.    Skin: Negative for rash and suspicious lesions.   Musculoskeletal: Negative for joint pain and muscle weakness.   Gastrointestinal: Negative for abdominal pain, diarrhea, nausea and vomiting.   Genitourinary: Negative for dysuria and frequency.   Neurological: Negative for focal weakness, headaches and tremors.     Objective:     Vital Signs (Most Recent):  Temp: 98 °F (36.7 °C) (03/15/19 0759)  Pulse: (!) 40 (03/15/19 0917)  Resp: 16 (03/15/19 0759)  BP: (!) 79/47 (03/15/19 0917)  SpO2: 95 % (03/15/19 0917) Vital Signs (24h Range):  Temp:  [98 °F (36.7 °C)] 98 °F (36.7 °C)  Pulse:  [33-54] 40  Resp:  [16] 16  SpO2:  [95 %-98 %] 95 %  BP: (75-80)/(40-47) 79/47        There is no height or weight on file to calculate BMI.    SpO2: 95 %  O2 Device (Oxygen Therapy): nasal cannula      Intake/Output Summary (Last 24 hours) at 3/15/2019 1006  Last data filed at 3/15/2019 0845  Gross per 24 hour   Intake 500 ml   Output --   Net 500 ml       Lines/Drains/Airways     Drain                 Closed/Suction Drain 08/28/14 1452 Left;Lateral Abdomen Bulb 10 Fr. 1659 days                Physical Exam   Constitutional: He is oriented to person, place, and time. He appears well-developed and well-nourished.   HENT:   Head: Normocephalic and atraumatic.   Eyes: EOM are normal.   Cardiovascular: An irregularly irregular rhythm present. Bradycardia present. Exam reveals no gallop and no friction rub.   Pulmonary/Chest: Effort  normal and breath sounds normal. No stridor. He has no wheezes. He has no rales.   Abdominal: Soft. Bowel sounds are normal. There is no rebound and no guarding.   Musculoskeletal: He exhibits no edema.   Neurological: He is alert and oriented to person, place, and time. No cranial nerve deficit.   Skin: Skin is warm and dry.   Psychiatric: He has a normal mood and affect. His behavior is normal.       Significant Labs:   BMP:   Recent Labs   Lab 03/15/19  0837   *   *   K 3.2*   CL 91*   CO2 16*   BUN 11   CREATININE 2.0*   CALCIUM 8.8   , CMP   Recent Labs   Lab 03/15/19  0837   *   K 3.2*   CL 91*   CO2 16*   *   BUN 11   CREATININE 2.0*   CALCIUM 8.8   PROT 7.1   ALBUMIN 3.7   BILITOT 1.8*   ALKPHOS 53*   AST 1,720*   *   ANIONGAP 18*   ESTGFRAFRICA 39.6*   EGFRNONAA 34.3*   , CBC   Recent Labs   Lab 03/15/19  0837   WBC 8.90   HGB 13.0*   HCT 39.4*   *   , INR No results for input(s): INR, PROTIME in the last 48 hours., Lipid Panel No results for input(s): CHOL, HDL, LDLCALC, TRIG, CHOLHDL in the last 48 hours. and Troponin   Recent Labs   Lab 03/15/19  0837   TROPONINI 0.015         Assessment and Plan:     On mechanically assisted ventilation    - intubated during ACLS  - AC mode with sedation     Cardiac arrest    - obtain TTE with CFD  - cover with broad spectrum abx for now (leukocytosis post-ROSC)  - maintain K>4 and Mg>2  - obtain EEG   - treat BB poisoning     Lactic acidosis    - AGAP present prior to cardiac arrest  - lactic acid after arrest is 8.1  - ED has administered more isotonic fluid  - optimize cardiac profile  - obtain BCx x2, UA with reflex UCx  - administer broad spectrum antibiotics     Hyponatremia    - not explained by hyperglycemia and not on diuretics  - in the setting of JASS; ED administered isotonic fluid  - continue to monitor     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    - prominent features include hypotension and  bradycardia  - ECG with bradyarrhythmia; patient received fluid in the ED  - IV atropine, IV glucagon (bolus followed by gtt) and IV calcium  - TVP initially discussed with Dr. Alem Miller: recommended atropine 1 mg IV  - TVP placed post-ROSC with base rate set at 60 BPM  - f/u TTE with CFD  - hold BB, CCB and flecainide     Diabetes mellitus without complication    - monitor BG on glucagon drip  - SSI     Paroxysmal atrial fibrillation    - hold BB, CCB and flecainide in the setting of bradycardia  - held NOAC for several days pending liver bx  - IV UFH in lieu of NOAC      Elevated LFTs    - consult hepatology with concern for alcoholic hepatitis  - scheduled for liver biopsy this month  - hold statin and fenofibrate  - optimize cardiac profile     Alcohol withdrawal    - sever presentation with agitation, hallucinations, and autonomic instability  - patient currently intubated and mechanically ventilated with continuous IV sedation ordered  - IV lorazepam prn     Hypertriglyceridemia    - hold statin         VTE Risk Mitigation (From admission, onward)        Ordered     enoxaparin injection 40 mg  Daily      03/15/19 0847     IP VTE HIGH RISK PATIENT  Once      03/15/19 0847          Rod Hogan MD  Cardiology   [facility]

## 2019-03-15 NOTE — PROCEDURES
TVP placed in sterile fashion via the right IJ vein post-ROSC. No complications. CXR confirmd apical RV placement and capture appropriate.    Date of procedure:  3/15/19

## 2019-03-15 NOTE — ED PROVIDER NOTES
Encounter Date: 3/15/2019       History     Chief Complaint   Patient presents with    Bradycardia     Patient woke with a heart rate of 20 with nausea and dizziness.  Patient states 3-4 days of general illness.     HPI  Review of patient's allergies indicates:   Allergen Reactions    No known drug allergies      Past Medical History:   Diagnosis Date    A-fib     Alcohol abuse     Arthritis     Chronic gout     Diabetes mellitus     DM (diabetes mellitus) 11/16/2016    Fatty liver     Hyperlipidemia     Renal cell cancer 2014     Past Surgical History:   Procedure Laterality Date    ABSCESS DRAINAGE      perirectal    COLONOSCOPY      FISTULOTOMY N/A 5/19/2015    [facility]    HAND SURGERY Left     HEMORRHOIDECTOMY N/A 5/19/2015    [facility]    KIDNEY SURGERY      partial left kidney removal - [place]    PARTIAL NEPHRECTOMY Left August 2014    ROBOTIC ASSISTED LAPAROSCOPIC NEPHRECTOMY PARTIAL Left 8/28/2014    Performed by Fabian Estevez MD at [facility] OR [address] [place] [address]     Family History   Problem Relation Age of Onset    Lung cancer Father     Cancer Father     Diabetes Mother     Lung cancer Sister     Colon polyps Brother     Cirrhosis Neg Hx      Social History     Tobacco Use    Smoking status: Never Smoker    Smokeless tobacco: Never Used   Substance Use Topics    Alcohol use: Yes     Alcohol/week: 4.2 oz     Types: 7 Cans of beer per week     Comment: patient states he drinks less than he use to    Drug use: No     Review of Systems    Physical Exam     Initial Vitals [03/15/19 0759]   BP Pulse Resp Temp SpO2   (!) 80/40 (!) 33 16 98 °F (36.7 °C) 98 %      MAP       --         Physical Exam    ED Course   Intubation  Date/Time: 3/15/2019 12:09 PM  Location procedure was performed: [facility] EMERGENCY DEPARTMENT  Performed by: Nicole Barillas PA-C  Authorized by: Edd Menendez MD   Consent Done: Emergent  Situation  Indications: respiratory distress and  airway protection  Intubation method: video-assisted  Patient status: paralyzed (RSI)  Preoxygenation: BVM  Pretreatment medications: none  Sedatives: etomidate (30)  Paralytic: rocuronium (100)  Laryngoscope size: Mac 4  Tube size: 7.5 mm  Tube type: cuffed  Number of attempts: 1  Cricoid pressure: yes  Cords visualized: yes  Post-procedure assessment: chest rise,  ETCO2 monitor and CO2 detector  Breath sounds: clear  Cuff inflated: yes  ETT to lip: 25 cm  ETT to teeth: 23 cm  Tube secured with: ETT marion  Chest x-ray interpreted by radiologist and other physician.  Complications: No  Estimated blood loss (mL): 0  Specimens: No  Implants: No        Labs Reviewed   CBC W/ AUTO DIFFERENTIAL - Abnormal; Notable for the following components:       Result Value    RBC 3.89 (*)     Hemoglobin 13.0 (*)     Hematocrit 39.4 (*)      (*)     MCH 33.4 (*)     Platelets 114 (*)     Immature Granulocytes 0.6 (*)     Immature Grans (Abs) 0.05 (*)     nRBC 1 (*)     All other components within normal limits   COMPREHENSIVE METABOLIC PANEL - Abnormal; Notable for the following components:    Sodium 125 (*)     Potassium 3.2 (*)     Chloride 91 (*)     CO2 16 (*)     Glucose 172 (*)     Creatinine 2.0 (*)     Total Bilirubin 1.8 (*)     Alkaline Phosphatase 53 (*)     AST 1,720 (*)      (*)     Anion Gap 18 (*)     eGFR if  39.6 (*)     eGFR if non  34.3 (*)     All other components within normal limits   B-TYPE NATRIURETIC PEPTIDE - Abnormal; Notable for the following components:     (*)     All other components within normal limits   ALCOHOL,MEDICAL (ETHANOL) - Abnormal; Notable for the following components:    Alcohol, Medical, Serum 78 (*)     All other components within normal limits   TROPONIN I   MAGNESIUM   MAGNESIUM    Narrative:     ADD ON MAGNESIUM PER MY FRAGOSO  10:08  03/15/2019    APTT   LACTIC ACID, PLASMA    OSMOLALITY, SERUM   SODIUM, URINE, RANDOM   APTT   PROTIME-INR   CBC W/ AUTO DIFFERENTIAL        ECG Results          EKG 12-lead (In process)  Result time 03/15/19 11:57:33    In process by Interface, Lab In [facility] (03/15/19 11:57:33)                 Narrative:    Test Reason : (Not Selected)    Vent. Rate : 125 BPM     Atrial Rate : 097 BPM     P-R Int : 000 ms          QRS Dur : 146 ms      QT Int : 420 ms       P-R-T Axes : 000 -52 073 degrees     QTc Int : 606 ms    Wide QRS tachycardia with occasional Premature ventricular complexes  Left axis deviation  Right bundle branch block  Abnormal ECG  When compared with ECG of 15-MAR-2019 08:11,  Previous ECG has undetermined rhythm, needs review    Referred By: [email or web address]   SELF           Confirmed By:                              EKG 12-lead (In process)  Result time 03/15/19 08:24:58    In process by Interface, Lab In [facility] (03/15/19 08:24:58)                 Narrative:    Test Reason : (Not Selected)    Vent. Rate : 046 BPM     Atrial Rate : 027 BPM     P-R Int : 000 ms          QRS Dur : 122 ms      QT Int : 584 ms       P-R-T Axes : 000 036 039 degrees     QTc Int : 511 ms    Undetermined rhythm  Nonspecific intraventricular conduction delay  Nonspecific T wave abnormality  Abnormal ECG  When compared with ECG of 04-MAR-2019 09:25,  Current undetermined rhythm precludes rhythm comparison, needs review  QRS duration has increased  QT has lengthened    Referred By: System System           Confirmed By:                             Imaging Results          X-Ray Chest AP Portable (Final result)  Result time 03/15/19 08:59:03    Final result by Stephen Biswas III, MD (03/15/19 08:59:03)                 Impression:      See above    No acute process seen.      Electronically signed by: Stephen Biswas MD  Date:    03/15/2019  Time:    08:59             Narrative:    EXAMINATION:  XR CHEST AP PORTABLE    CLINICAL HISTORY:  Chest Pain;    FINDINGS:  There is mild  cardiomegaly.  The lungs are clear.                                                      Clinical Impression:       ICD-10-CM ICD-9-CM   1. Bradycardia R00.1 427.89   2. Chest pain R07.9 786.50   3. Alcoholism /alcohol abuse F10.20 303.90   4. AF (atrial fibrillation) I48.91 427.31   5. Encounter for intubation Z01.818 V72.83         Disposition:   Disposition: Admitted  Condition: Critical                        Nicole Barillas PA-C  03/15/19 1214

## 2019-03-15 NOTE — ASSESSMENT & PLAN NOTE
- not explained by hyperglycemia and not on diuretics  - JASS present on admission  - measure serum osmolality and urine sodium

## 2019-03-15 NOTE — ASSESSMENT & PLAN NOTE
- sever presentation with agitation, hallucinations, and autonomic instability  - patient currently intubated and mechanically ventilated with continuous IV sedation ordered  - IV lorazepam prn

## 2019-03-16 PROBLEM — A41.9 SEPSIS: Status: ACTIVE | Noted: 2019-03-16

## 2019-03-16 LAB
ALBUMIN SERPL BCP-MCNC: 3.1 G/DL
ALBUMIN SERPL BCP-MCNC: 3.1 G/DL
ALLENS TEST: ABNORMAL
ALP SERPL-CCNC: 45 U/L
ALP SERPL-CCNC: 51 U/L
ALT SERPL W/O P-5'-P-CCNC: 518 U/L
ALT SERPL W/O P-5'-P-CCNC: 545 U/L
ANION GAP SERPL CALC-SCNC: 12 MMOL/L
ANION GAP SERPL CALC-SCNC: 12 MMOL/L
APTT BLDCRRT: 24 SEC
APTT BLDCRRT: 25.5 SEC
APTT BLDCRRT: 36.5 SEC
APTT BLDCRRT: 36.5 SEC
AST SERPL-CCNC: 2574 U/L
AST SERPL-CCNC: 2675 U/L
BASOPHILS # BLD AUTO: 0.05 K/UL
BASOPHILS NFR BLD: 0.9 %
BILIRUB SERPL-MCNC: 1.6 MG/DL
BILIRUB SERPL-MCNC: 1.7 MG/DL
BUN SERPL-MCNC: 10 MG/DL
BUN SERPL-MCNC: 12 MG/DL
CA-I BLDV-SCNC: 0.96 MMOL/L
CALCIUM SERPL-MCNC: 7.5 MG/DL
CALCIUM SERPL-MCNC: 8.4 MG/DL
CHLORIDE SERPL-SCNC: 101 MMOL/L
CHLORIDE SERPL-SCNC: 98 MMOL/L
CO2 SERPL-SCNC: 19 MMOL/L
CO2 SERPL-SCNC: 21 MMOL/L
CREAT SERPL-MCNC: 1.5 MG/DL
CREAT SERPL-MCNC: 1.6 MG/DL
DELSYS: ABNORMAL
DIFFERENTIAL METHOD: ABNORMAL
EOSINOPHIL # BLD AUTO: 0.1 K/UL
EOSINOPHIL NFR BLD: 1.7 %
ERYTHROCYTE [DISTWIDTH] IN BLOOD BY AUTOMATED COUNT: 12.9 %
ERYTHROCYTE [SEDIMENTATION RATE] IN BLOOD BY WESTERGREN METHOD: 16 MM/H
EST. GFR  (AFRICAN AMERICAN): 51.9 ML/MIN/1.73 M^2
EST. GFR  (AFRICAN AMERICAN): 56.1 ML/MIN/1.73 M^2
EST. GFR  (NON AFRICAN AMERICAN): 44.9 ML/MIN/1.73 M^2
EST. GFR  (NON AFRICAN AMERICAN): 48.5 ML/MIN/1.73 M^2
FIO2: 40
GLUCOSE SERPL-MCNC: 148 MG/DL
GLUCOSE SERPL-MCNC: 212 MG/DL
HCO3 UR-SCNC: 20.5 MMOL/L (ref 24–28)
HCT VFR BLD AUTO: 34.3 %
HGB BLD-MCNC: 11.9 G/DL
IMM GRANULOCYTES # BLD AUTO: 0.03 K/UL
IMM GRANULOCYTES NFR BLD AUTO: 0.6 %
LACTATE SERPL-SCNC: 0.8 MMOL/L
LACTATE SERPL-SCNC: 0.9 MMOL/L
LACTATE SERPL-SCNC: 1 MMOL/L
LYMPHOCYTES # BLD AUTO: 1.1 K/UL
LYMPHOCYTES NFR BLD: 20.1 %
MAGNESIUM SERPL-MCNC: 1.4 MG/DL
MAGNESIUM SERPL-MCNC: 2.1 MG/DL
MCH RBC QN AUTO: 33.9 PG
MCHC RBC AUTO-ENTMCNC: 34.7 G/DL
MCV RBC AUTO: 98 FL
MIN VOL: 8.34
MODE: ABNORMAL
MONOCYTES # BLD AUTO: 0.2 K/UL
MONOCYTES NFR BLD: 3.3 %
NEUTROPHILS # BLD AUTO: 4 K/UL
NEUTROPHILS NFR BLD: 73.4 %
NRBC BLD-RTO: 0 /100 WBC
PCO2 BLDA: 32.6 MMHG (ref 35–45)
PEEP: 5
PH SMN: 7.41 [PH] (ref 7.35–7.45)
PHOSPHATE SERPL-MCNC: 1.7 MG/DL
PIP: 29
PLATELET # BLD AUTO: 75 K/UL
PMV BLD AUTO: 10.5 FL
PO2 BLDA: 150 MMHG (ref 80–100)
POC BE: -4 MMOL/L
POC SATURATED O2: 99 % (ref 95–100)
POC TCO2: 22 MMOL/L (ref 23–27)
POCT GLUCOSE: 224 MG/DL (ref 70–110)
POTASSIUM SERPL-SCNC: 3.2 MMOL/L
POTASSIUM SERPL-SCNC: 3.5 MMOL/L
PROT SERPL-MCNC: 6 G/DL
PROT SERPL-MCNC: 6.3 G/DL
RBC # BLD AUTO: 3.51 M/UL
SAMPLE: ABNORMAL
SITE: ABNORMAL
SODIUM SERPL-SCNC: 129 MMOL/L
SODIUM SERPL-SCNC: 134 MMOL/L
SP02: 100
T4 FREE SERPL-MCNC: 0.91 NG/DL
TSH SERPL DL<=0.005 MIU/L-ACNC: 2.15 UIU/ML
VT: 500
WBC # BLD AUTO: 5.42 K/UL

## 2019-03-16 PROCEDURE — 84443 ASSAY THYROID STIM HORMONE: CPT

## 2019-03-16 PROCEDURE — 83605 ASSAY OF LACTIC ACID: CPT | Mod: 91

## 2019-03-16 PROCEDURE — 93010 EKG 12-LEAD: ICD-10-PCS | Mod: ,,, | Performed by: INTERNAL MEDICINE

## 2019-03-16 PROCEDURE — C9113 INJ PANTOPRAZOLE SODIUM, VIA: HCPCS | Performed by: INTERNAL MEDICINE

## 2019-03-16 PROCEDURE — 84100 ASSAY OF PHOSPHORUS: CPT

## 2019-03-16 PROCEDURE — 94761 N-INVAS EAR/PLS OXIMETRY MLT: CPT

## 2019-03-16 PROCEDURE — 25000003 PHARM REV CODE 250: Performed by: INTERNAL MEDICINE

## 2019-03-16 PROCEDURE — 83605 ASSAY OF LACTIC ACID: CPT

## 2019-03-16 PROCEDURE — 95813 PR EEG, EXTENDED, 61-119 MINS: ICD-10-PCS | Mod: 26,,, | Performed by: PSYCHIATRY & NEUROLOGY

## 2019-03-16 PROCEDURE — 93010 ELECTROCARDIOGRAM REPORT: CPT | Mod: ,,, | Performed by: INTERNAL MEDICINE

## 2019-03-16 PROCEDURE — 94003 VENT MGMT INPAT SUBQ DAY: CPT

## 2019-03-16 PROCEDURE — 99233 SBSQ HOSP IP/OBS HIGH 50: CPT | Mod: ,,, | Performed by: INTERNAL MEDICINE

## 2019-03-16 PROCEDURE — 85730 THROMBOPLASTIN TIME PARTIAL: CPT

## 2019-03-16 PROCEDURE — 63600175 PHARM REV CODE 636 W HCPCS: Performed by: STUDENT IN AN ORGANIZED HEALTH CARE EDUCATION/TRAINING PROGRAM

## 2019-03-16 PROCEDURE — 99233 PR SUBSEQUENT HOSPITAL CARE,LEVL III: ICD-10-PCS | Mod: ,,, | Performed by: INTERNAL MEDICINE

## 2019-03-16 PROCEDURE — 20000000 HC ICU ROOM

## 2019-03-16 PROCEDURE — 36600 WITHDRAWAL OF ARTERIAL BLOOD: CPT

## 2019-03-16 PROCEDURE — 82330 ASSAY OF CALCIUM: CPT

## 2019-03-16 PROCEDURE — A4216 STERILE WATER/SALINE, 10 ML: HCPCS | Performed by: INTERNAL MEDICINE

## 2019-03-16 PROCEDURE — 85025 COMPLETE CBC W/AUTO DIFF WBC: CPT

## 2019-03-16 PROCEDURE — 85730 THROMBOPLASTIN TIME PARTIAL: CPT | Mod: 91

## 2019-03-16 PROCEDURE — 84439 ASSAY OF FREE THYROXINE: CPT

## 2019-03-16 PROCEDURE — 82803 BLOOD GASES ANY COMBINATION: CPT

## 2019-03-16 PROCEDURE — 93005 ELECTROCARDIOGRAM TRACING: CPT

## 2019-03-16 PROCEDURE — 25000003 PHARM REV CODE 250: Performed by: STUDENT IN AN ORGANIZED HEALTH CARE EDUCATION/TRAINING PROGRAM

## 2019-03-16 PROCEDURE — 83735 ASSAY OF MAGNESIUM: CPT | Mod: 91

## 2019-03-16 PROCEDURE — 80053 COMPREHEN METABOLIC PANEL: CPT

## 2019-03-16 PROCEDURE — 80053 COMPREHEN METABOLIC PANEL: CPT | Mod: 91

## 2019-03-16 PROCEDURE — 63600175 PHARM REV CODE 636 W HCPCS: Performed by: INTERNAL MEDICINE

## 2019-03-16 PROCEDURE — 63600175 PHARM REV CODE 636 W HCPCS

## 2019-03-16 PROCEDURE — 99900035 HC TECH TIME PER 15 MIN (STAT)

## 2019-03-16 PROCEDURE — 95813 EEG EXTND MNTR 61-119 MIN: CPT | Mod: 26,,, | Performed by: PSYCHIATRY & NEUROLOGY

## 2019-03-16 PROCEDURE — 83735 ASSAY OF MAGNESIUM: CPT

## 2019-03-16 PROCEDURE — 99900026 HC AIRWAY MAINTENANCE (STAT)

## 2019-03-16 PROCEDURE — 25000003 PHARM REV CODE 250

## 2019-03-16 RX ORDER — PROPOFOL 10 MG/ML
50 INJECTION, EMULSION INTRAVENOUS CONTINUOUS
Status: DISCONTINUED | OUTPATIENT
Start: 2019-03-16 | End: 2019-03-21

## 2019-03-16 RX ORDER — SODIUM CHLORIDE 9 MG/ML
INJECTION, SOLUTION INTRAVENOUS CONTINUOUS
Status: DISCONTINUED | OUTPATIENT
Start: 2019-03-16 | End: 2019-03-17

## 2019-03-16 RX ORDER — POTASSIUM CHLORIDE 20 MEQ/15ML
40 SOLUTION ORAL ONCE
Status: COMPLETED | OUTPATIENT
Start: 2019-03-16 | End: 2019-03-16

## 2019-03-16 RX ORDER — MAGNESIUM SULFATE HEPTAHYDRATE 40 MG/ML
4 INJECTION, SOLUTION INTRAVENOUS
Status: DISCONTINUED | OUTPATIENT
Start: 2019-03-16 | End: 2019-03-24

## 2019-03-16 RX ORDER — NOREPINEPHRINE BITARTRATE/D5W 4MG/250ML
0.02 PLASTIC BAG, INJECTION (ML) INTRAVENOUS CONTINUOUS
Status: DISCONTINUED | OUTPATIENT
Start: 2019-03-16 | End: 2019-03-22

## 2019-03-16 RX ORDER — CHLORHEXIDINE GLUCONATE ORAL RINSE 1.2 MG/ML
15 SOLUTION DENTAL 2 TIMES DAILY
Status: DISCONTINUED | OUTPATIENT
Start: 2019-03-16 | End: 2019-03-22

## 2019-03-16 RX ORDER — LANOLIN ALCOHOL/MO/W.PET/CERES
800 CREAM (GRAM) TOPICAL ONCE
Status: COMPLETED | OUTPATIENT
Start: 2019-03-16 | End: 2019-03-16

## 2019-03-16 RX ORDER — POTASSIUM CHLORIDE 29.8 MG/ML
80 INJECTION INTRAVENOUS
Status: DISCONTINUED | OUTPATIENT
Start: 2019-03-16 | End: 2019-03-24

## 2019-03-16 RX ORDER — POTASSIUM CHLORIDE 14.9 MG/ML
60 INJECTION INTRAVENOUS
Status: DISCONTINUED | OUTPATIENT
Start: 2019-03-16 | End: 2019-03-24

## 2019-03-16 RX ORDER — POTASSIUM CHLORIDE 29.8 MG/ML
40 INJECTION INTRAVENOUS
Status: DISCONTINUED | OUTPATIENT
Start: 2019-03-16 | End: 2019-03-24

## 2019-03-16 RX ORDER — POTASSIUM CHLORIDE 29.8 MG/ML
40 INJECTION INTRAVENOUS ONCE
Status: COMPLETED | OUTPATIENT
Start: 2019-03-16 | End: 2019-03-16

## 2019-03-16 RX ORDER — MAGNESIUM SULFATE HEPTAHYDRATE 40 MG/ML
2 INJECTION, SOLUTION INTRAVENOUS
Status: DISCONTINUED | OUTPATIENT
Start: 2019-03-16 | End: 2019-03-24

## 2019-03-16 RX ADMIN — PROPOFOL 50 MCG/KG/MIN: 10 INJECTION, EMULSION INTRAVENOUS at 12:03

## 2019-03-16 RX ADMIN — POTASSIUM CHLORIDE 40 MEQ: 400 INJECTION, SOLUTION INTRAVENOUS at 06:03

## 2019-03-16 RX ADMIN — THIAMINE HYDROCHLORIDE 100 MG: 100 INJECTION, SOLUTION INTRAMUSCULAR; INTRAVENOUS at 09:03

## 2019-03-16 RX ADMIN — VANCOMYCIN HYDROCHLORIDE 1000 MG: 1 INJECTION, POWDER, LYOPHILIZED, FOR SOLUTION INTRAVENOUS at 02:03

## 2019-03-16 RX ADMIN — SODIUM CHLORIDE: 0.9 INJECTION, SOLUTION INTRAVENOUS at 01:03

## 2019-03-16 RX ADMIN — MAGNESIUM SULFATE HEPTAHYDRATE 3 G: 500 INJECTION, SOLUTION INTRAMUSCULAR; INTRAVENOUS at 06:03

## 2019-03-16 RX ADMIN — MAGNESIUM OXIDE TAB 400 MG (241.3 MG ELEMENTAL MG) 800 MG: 400 (241.3 MG) TAB at 06:03

## 2019-03-16 RX ADMIN — CALCIUM GLUCONATE 1 G: 98 INJECTION, SOLUTION INTRAVENOUS at 12:03

## 2019-03-16 RX ADMIN — HEPARIN SODIUM AND DEXTROSE 15 UNITS/KG/HR: 10000; 5 INJECTION INTRAVENOUS at 06:03

## 2019-03-16 RX ADMIN — PROPOFOL 60 MCG/KG/MIN: 10 INJECTION, EMULSION INTRAVENOUS at 08:03

## 2019-03-16 RX ADMIN — PROPOFOL 75 MCG/KG/MIN: 10 INJECTION, EMULSION INTRAVENOUS at 05:03

## 2019-03-16 RX ADMIN — PANTOPRAZOLE SODIUM 40 MG: 40 INJECTION, POWDER, FOR SOLUTION INTRAVENOUS at 09:03

## 2019-03-16 RX ADMIN — POTASSIUM CHLORIDE 40 MEQ: 20 SOLUTION ORAL at 06:03

## 2019-03-16 RX ADMIN — PROPOFOL 50 MCG/KG/MIN: 10 INJECTION, EMULSION INTRAVENOUS at 06:03

## 2019-03-16 RX ADMIN — Medication 3 ML: at 06:03

## 2019-03-16 RX ADMIN — MIDAZOLAM HYDROCHLORIDE 4 MG/HR: 5 INJECTION, SOLUTION INTRAMUSCULAR; INTRAVENOUS at 08:03

## 2019-03-16 RX ADMIN — PROPOFOL 60 MCG/KG/MIN: 10 INJECTION, EMULSION INTRAVENOUS at 09:03

## 2019-03-16 RX ADMIN — PROPOFOL 75 MCG/KG/MIN: 10 INJECTION, EMULSION INTRAVENOUS at 04:03

## 2019-03-16 RX ADMIN — SODIUM CHLORIDE 500 ML: 0.9 INJECTION, SOLUTION INTRAVENOUS at 03:03

## 2019-03-16 RX ADMIN — PROPOFOL 50 MCG/KG/MIN: 10 INJECTION, EMULSION INTRAVENOUS at 03:03

## 2019-03-16 RX ADMIN — POTASSIUM CHLORIDE 40 MEQ: 20 SOLUTION ORAL at 05:03

## 2019-03-16 RX ADMIN — ASPIRIN 81 MG CHEWABLE TABLET 81 MG: 81 TABLET CHEWABLE at 09:03

## 2019-03-16 RX ADMIN — Medication 0.08 MCG/KG/MIN: at 10:03

## 2019-03-16 RX ADMIN — PROPOFOL 60 MCG/KG/MIN: 10 INJECTION, EMULSION INTRAVENOUS at 01:03

## 2019-03-16 RX ADMIN — CEFEPIME 1 G: 1 INJECTION, POWDER, FOR SOLUTION INTRAMUSCULAR; INTRAVENOUS at 03:03

## 2019-03-16 RX ADMIN — VENLAFAXINE 37.5 MG: 37.5 TABLET ORAL at 09:03

## 2019-03-16 RX ADMIN — CHLORHEXIDINE GLUCONATE 0.12% ORAL RINSE 15 ML: 1.2 LIQUID ORAL at 09:03

## 2019-03-16 RX ADMIN — CEFEPIME 1 G: 1 INJECTION, POWDER, FOR SOLUTION INTRAMUSCULAR; INTRAVENOUS at 02:03

## 2019-03-16 NOTE — HOSPITAL COURSE
Patient coded in ED, subsequently ROSC achieved and intubated. TVP placed in sterile fashion via the right IJ vein post-ROSC. Placed on versed gtt and propofol gtt. Vancomycin and cefepime started as concern for septic shock. TVP pulled on 3/16. 3/20 EKG shows A-flutter and Digoxin started. Overnight, converted to A-fib and metoprolol started. Extubated on 3/21. Has had AMS without focal neurological defects. CT head unremarkable and neurology consulted.

## 2019-03-16 NOTE — PROGRESS NOTES
[facility]  Cardiology  Progress Note    Patient Name: Donald Warren Abadie  MRN: [id number]  Admission Date: 3/15/2019  Hospital Length of Stay: 1 days  Code Status: Full Code   Attending Physician: Luis Miguel Lagos MD   Primary Care Physician: Bacilio Larry MD  Expected Discharge Date:   Principal Problem:<principal problem not specified>    Subjective:     Hospital Course:   Patient coded in ED, subsequently ROSC achieved and intubated. TVP placed in sterile fashion via the right IJ vein post-ROSC. Placed on versed gtt and propofol gtt. Vancomycin and cefepime started as concern for septic shock. TVP pulled on 3/16.         Interval History: NAEON. Patient requiring increased versed gtt and propofol gtt.     Review of Systems   Unable to perform ROS: intubated   Constitution: Negative for chills and fever.   HENT: Negative for ear discharge.    Eyes: Negative for pain and visual disturbance.   Cardiovascular: Positive for palpitations. Negative for chest pain, dyspnea on exertion, irregular heartbeat, leg swelling, orthopnea, paroxysmal nocturnal dyspnea and syncope.   Respiratory: Negative for hemoptysis, shortness of breath and wheezing.    Skin: Negative for rash and suspicious lesions.   Musculoskeletal: Negative for joint pain and muscle weakness.   Gastrointestinal: Negative for abdominal pain, diarrhea, nausea and vomiting.   Genitourinary: Negative for dysuria and frequency.   Neurological: Negative for focal weakness, headaches and tremors.     Objective:     Vital Signs (Most Recent):  Temp: 99.5 °F (37.5 °C) (03/16/19 1115)  Pulse: (!) 54 (03/16/19 1203)  Resp: 16 (03/16/19 1203)  BP: (!) 119/59 (03/16/19 1203)  SpO2: 99 % (03/16/19 1203) Vital Signs (24h Range):  Temp:  [98.6 °F (37 °C)-100.4 °F (38 °C)] 99.5 °F (37.5 °C)  Pulse:  [] 54  Resp:  [14-37] 16  SpO2:  [97 %-100 %] 99 %  BP: ()/(50-95) 119/59     Weight: 86.5 kg (190 lb 11.2 oz)  Body mass index is 30.79  kg/m².     SpO2: 99 %  O2 Device (Oxygen Therapy): ventilator      Intake/Output Summary (Last 24 hours) at 3/16/2019 1214  Last data filed at 3/16/2019 1047  Gross per 24 hour   Intake 2483.04 ml   Output 4185 ml   Net -1701.96 ml       Lines/Drains/Airways     Central Venous Catheter Line                 Introducer 03/15/19 1900 right internal jugular less than 1 day          Drain                 NG/OG Tube 03/15/19 1150 [place] sump 18 Fr. Center mouth 1 day         Urethral Catheter 03/15/19 1206 Double-lumen 16 Fr. 1 day          Airway                 Airway - Non-Surgical 03/15/19 1151 Endotracheal Tube 1 day          Line                 Pacer Wires 03/15/19 1200 1 day          Peripheral Intravenous Line                 Peripheral IV - Single Lumen 03/15/19 0000 Right Antecubital 1 day         Peripheral IV - Single Lumen 03/15/19 1135 Left Antecubital 1 day         Peripheral IV - Single Lumen 03/15/19 2000 Right Forearm less than 1 day                Physical Exam   Constitutional: He is oriented to person, place, and time. He appears well-developed and well-nourished. He is intubated.   HENT:   Head: Normocephalic and atraumatic.   Eyes: EOM are normal.   Cardiovascular: An irregularly irregular rhythm present. Bradycardia present. Exam reveals no gallop and no friction rub.   Pulmonary/Chest: Effort normal and breath sounds normal. No stridor. He is intubated. He has no wheezes. He has no rales.   Abdominal: Soft. Bowel sounds are normal. There is no rebound and no guarding.   Musculoskeletal: He exhibits no edema.   Neurological: He is alert and oriented to person, place, and time. No cranial nerve deficit.   Skin: Skin is warm and dry.   Psychiatric: He has a normal mood and affect. His behavior is normal.       Significant Labs: All pertinent lab results from the last 24 hours have been reviewed.    Significant Imaging: Reviewed.    Assessment and Plan:         On mechanically assisted ventilation     - intubated during ACLS  - AC mode with sedation       Cardiac arrest    - obtain TTE with CFD  - cover with broad spectrum abx for now (leukocytosis post-ROSC)  - maintain K>4 and Mg>2  - obtain EEG   - treat BB poisoning     Lactic acidosis    - AGAP present prior to cardiac arrest  - lactic acid after arrest is 8.1  - ED has administered more isotonic fluid  - optimize cardiac profile  - obtain BCx x2, UA with reflex UCx  - administer broad spectrum antibiotics  - continue vancomycin and cefepime       Hyponatremia    - not explained by hyperglycemia and not on diuretics  - in the setting of JASS; ED administered isotonic fluid  - continue to monitor       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    - prominent features include hypotension and bradycardia  - ECG with bradyarrhythmia; patient received fluid in the ED  - IV atropine, IV glucagon (bolus followed by gtt) and IV calcium  - TVP initially discussed with Dr. Alem Miller: recommended atropine 1 mg IV  - TVP placed post-ROSC with base rate set at 60 BPM  - f/u TTE with CFD  - hold BB, CCB and flecainide       Diabetes mellitus without complication    - monitor BG on glucagon drip  - SSI     Paroxysmal atrial fibrillation    - hold BB, CCB and flecainide in the setting of bradycardia  - held NOAC for several days pending liver bx  - IV UFH in lieu of NOAC        Elevated LFTs    - consult hepatology with concern for alcoholic hepatitis  - scheduled for liver biopsy this month  - hold statin and fenofibrate  - optimize cardiac profile     Alcohol withdrawal    - sever presentation with agitation, hallucinations, and autonomic instability  - patient currently intubated and mechanically ventilated with continuous IV sedation ordered  - continue versed gtt and propofol gtt     Hypertriglyceridemia    - hold statin         VTE Risk Mitigation (From admission, onward)        Ordered     heparin 25,000 units in dextrose 5% 250 mL (100 units/mL)  infusion LOW INTENSITY nomogram - [facility]  Continuous      03/15/19 1036     heparin 25,000 units in dextrose 5% (100 units/ml) IV bolus from bag - ADDITIONAL PRN BOLUS - 60 units/kg  As needed (PRN)      03/15/19 1036     heparin 25,000 units in dextrose 5% (100 units/ml) IV bolus from bag - ADDITIONAL PRN BOLUS - 30 units/kg  As needed (PRN)      03/15/19 1036     IP VTE HIGH RISK PATIENT  Once      03/15/19 0847          Quique Gonzalez MD  Cardiology  [facility]

## 2019-03-16 NOTE — ASSESSMENT & PLAN NOTE
- AGAP present prior to cardiac arrest  - lactic acid after arrest is 8.1  - ED has administered more isotonic fluid  - optimize cardiac profile  - obtain BCx x2, UA with reflex UCx  - administer broad spectrum antibiotics  - continue vancomycin and cefepime

## 2019-03-16 NOTE — PROGRESS NOTES
Received patient from ER intubated with a 7.5 ET-tube secured 25 @ the lip with a commercial tube marion, patient placed on continued mechanical ventilation with previous settings from ER without incident.

## 2019-03-16 NOTE — SUBJECTIVE & OBJECTIVE
Interval History:   Patient in the unit, intubated, sedated.  Expected rise in LFT's.    Current Facility-Administered Medications   Medication    0.9%  NaCl infusion    allopurinol tablet 200 mg    aspirin chewable tablet 81 mg    calcium gluconate 1g in dextrose 5% 100mL (ready to mix system)    calcium gluconate 1g in dextrose 5% 100mL (ready to mix system)    calcium gluconate 1g in dextrose 5% 100mL (ready to mix system)    ceFEPIme injection 1 g    chlorhexidine 0.12 % solution 15 mL    glucagon (human recombinant) 5 mg in sodium chloride 0.9% 100 mL infusion    heparin 25,000 units in dextrose 5% (100 units/ml) IV bolus from bag - ADDITIONAL PRN BOLUS - 30 units/kg    heparin 25,000 units in dextrose 5% (100 units/ml) IV bolus from bag - ADDITIONAL PRN BOLUS - 60 units/kg    heparin 25,000 units in dextrose 5% 250 mL (100 units/mL) infusion LOW INTENSITY nomogram - [facility]    lorazepam injection 2 mg    magnesium sulfate 2g in water 50mL IVPB (premix)    magnesium sulfate 2g in water 50mL IVPB (premix)    midazolam (VERSED) 1 mg/mL in dextrose 5 % 100 mL infusion (titrating)    norepinephrine 4 mg in dextrose 5% 250 mL infusion (premix) (titrating)    pantoprazole injection 40 mg    potassium chloride 40 mEq in 100 mL IVPB (FOR CENTRAL LINE ADMINISTRATION ONLY)    And    potassium chloride 20 mEq in 100 mL IVPB (FOR CENTRAL LINE ADMINISTRATION ONLY)    And    potassium chloride 40 mEq in 100 mL IVPB (FOR CENTRAL LINE ADMINISTRATION ONLY)    propofol (DIPRIVAN) 10 mg/mL infusion    sodium chloride 0.9% flush 3 mL    thiamine (B-1) 100 mg in dextrose 5 % 50 mL IVPB    vancomycin in dextrose 5 % 1 gram/250 mL IVPB 1,000 mg    venlafaxine tablet 37.5 mg       Objective:     Vital Signs (Most Recent):  Temp: 99.5 °F (37.5 °C) (03/16/19 1115)  Pulse: (!) 54 (03/16/19 1203)  Resp: 16 (03/16/19 1203)  BP: (!) 119/59 (03/16/19 1203)  SpO2: 99 % (03/16/19 1203) Vital Signs (24h Range):  Temp:   [98.6 °F (37 °C)-100.4 °F (38 °C)] 99.5 °F (37.5 °C)  Pulse:  [] 54  Resp:  [14-37] 16  SpO2:  [98 %-100 %] 99 %  BP: ()/(50-95) 119/59     Weight: 86.5 kg (190 lb 11.2 oz) (03/16/19 0106)  Body mass index is 30.79 kg/m².    Physical Exam   Constitutional: No distress.   HENT:   Head: Normocephalic and atraumatic.   Eyes: No scleral icterus.   Cardiovascular: Normal rate and regular rhythm.   Pulmonary/Chest: Effort normal and breath sounds normal.   Abdominal: Soft. Bowel sounds are normal.   Musculoskeletal: He exhibits no edema.   Neurological:   intubated   Skin: He is not diaphoretic.       MELD-Na score: 22 at 3/16/2019  3:13 AM  MELD score: 15 at 3/16/2019  3:13 AM  Calculated from:  Serum Creatinine: 1.6 mg/dL at 3/16/2019  3:13 AM  Serum Sodium: 129 mmol/L at 3/16/2019  3:13 AM  Total Bilirubin: 1.6 mg/dL at 3/16/2019  3:13 AM  INR(ratio): 1.2 at 3/15/2019 11:25 AM  Age: 63 years    Significant Labs:  CBC:   Recent Labs   Lab 03/16/19  0313   WBC 5.42   RBC 3.51*   HGB 11.9*   HCT 34.3*   PLT 75*     CMP:   Recent Labs   Lab 03/16/19  0313   *   CALCIUM 7.5*   ALBUMIN 3.1*   PROT 6.0   *   K 3.2*   CO2 19*   CL 98   BUN 12   CREATININE 1.6*   ALKPHOS 45*   *   AST 2,574*   BILITOT 1.6*     Coagulation:   Recent Labs   Lab 03/15/19  1125  03/16/19  0809   INR 1.2  --   --    APTT 23.3   < > 36.5*  36.5*    < > = values in this interval not displayed.       Significant Imaging:  X-Ray: Reviewed

## 2019-03-16 NOTE — PROGRESS NOTES
[facility]  Hepatology  Progress Note    Patient Name: Donald Warren Abadie  MRN: [id number]  Admission Date: 3/15/2019  Hospital Length of Stay: 1 days  Attending Provider: Luis Miguel Lagos MD   Primary Care Physician: Bacilio Larry MD  Principal Problem:<principal problem not specified>    Subjective:   HPI: 63 year old male with a history of HTN, HLD, PAF, DM Type 2, Alcohol abuse with hepatic steatosis on who Hepatology is being consulted for elevated LFT's.    Patient known to Hepatology as he was following in clinic for NAFLD possibly secondary to diabetes vs alcohol with plans to obtain a liver biopsy in order to determine degree of fibrosis.  Per daughter he drinks 1 case of beer per day. In the last week however was trying to cut back. Yesterday he had 7 days and earlier in the week had another 2 beers. She thinks that he could have been withdrawing and took additional heart medication in hopes of feeling better. She states that today she was extremely short of breath, diaphoretic with a pulse of 20 when EMS arrived to pick him up. In the ED he was hypotensive and bradycardic. Labs were significant for hyponatremia, JASS, elevated LFT's, thrombocytopenia, and normal INR. He received atropine, calcium, and glucagon. Unfortunately he coded, ROSC was achieved but he remains intubated with a trans-venous pacer.     Interval History:   Patient in the unit, intubated, sedated.  Expected rise in LFT's.    Current Facility-Administered Medications   Medication    0.9%  NaCl infusion    allopurinol tablet 200 mg    aspirin chewable tablet 81 mg    calcium gluconate 1g in dextrose 5% 100mL (ready to mix system)    calcium gluconate 1g in dextrose 5% 100mL (ready to mix system)    calcium gluconate 1g in dextrose 5% 100mL (ready to mix system)    ceFEPIme injection 1 g    chlorhexidine 0.12 % solution 15 mL    glucagon (human recombinant) 5 mg in sodium chloride 0.9% 100 mL infusion     heparin 25,000 units in dextrose 5% (100 units/ml) IV bolus from bag - ADDITIONAL PRN BOLUS - 30 units/kg    heparin 25,000 units in dextrose 5% (100 units/ml) IV bolus from bag - ADDITIONAL PRN BOLUS - 60 units/kg    heparin 25,000 units in dextrose 5% 250 mL (100 units/mL) infusion LOW INTENSITY nomogram - [facility]    lorazepam injection 2 mg    magnesium sulfate 2g in water 50mL IVPB (premix)    magnesium sulfate 2g in water 50mL IVPB (premix)    midazolam (VERSED) 1 mg/mL in dextrose 5 % 100 mL infusion (titrating)    norepinephrine 4 mg in dextrose 5% 250 mL infusion (premix) (titrating)    pantoprazole injection 40 mg    potassium chloride 40 mEq in 100 mL IVPB (FOR CENTRAL LINE ADMINISTRATION ONLY)    And    potassium chloride 20 mEq in 100 mL IVPB (FOR CENTRAL LINE ADMINISTRATION ONLY)    And    potassium chloride 40 mEq in 100 mL IVPB (FOR CENTRAL LINE ADMINISTRATION ONLY)    propofol (DIPRIVAN) 10 mg/mL infusion    sodium chloride 0.9% flush 3 mL    thiamine (B-1) 100 mg in dextrose 5 % 50 mL IVPB    vancomycin in dextrose 5 % 1 gram/250 mL IVPB 1,000 mg    venlafaxine tablet 37.5 mg       Objective:     Vital Signs (Most Recent):  Temp: 99.5 °F (37.5 °C) (03/16/19 1115)  Pulse: (!) 54 (03/16/19 1203)  Resp: 16 (03/16/19 1203)  BP: (!) 119/59 (03/16/19 1203)  SpO2: 99 % (03/16/19 1203) Vital Signs (24h Range):  Temp:  [98.6 °F (37 °C)-100.4 °F (38 °C)] 99.5 °F (37.5 °C)  Pulse:  [] 54  Resp:  [14-37] 16  SpO2:  [98 %-100 %] 99 %  BP: ()/(50-95) 119/59     Weight: 86.5 kg (190 lb 11.2 oz) (03/16/19 0106)  Body mass index is 30.79 kg/m².    Physical Exam   Constitutional: No distress.   HENT:   Head: Normocephalic and atraumatic.   Eyes: No scleral icterus.   Cardiovascular: Normal rate and regular rhythm.   Pulmonary/Chest: Effort normal and breath sounds normal.   Abdominal: Soft. Bowel sounds are normal.   Musculoskeletal: He exhibits no edema.   Neurological:   intubated   Skin: He  is not diaphoretic.       MELD-Na score: 22 at 3/16/2019  3:13 AM  MELD score: 15 at 3/16/2019  3:13 AM  Calculated from:  Serum Creatinine: 1.6 mg/dL at 3/16/2019  3:13 AM  Serum Sodium: 129 mmol/L at 3/16/2019  3:13 AM  Total Bilirubin: 1.6 mg/dL at 3/16/2019  3:13 AM  INR(ratio): 1.2 at 3/15/2019 11:25 AM  Age: 63 years    Significant Labs:  CBC:   Recent Labs   Lab 03/16/19  0313   WBC 5.42   RBC 3.51*   HGB 11.9*   HCT 34.3*   PLT 75*     CMP:   Recent Labs   Lab 03/16/19  0313   *   CALCIUM 7.5*   ALBUMIN 3.1*   PROT 6.0   *   K 3.2*   CO2 19*   CL 98   BUN 12   CREATININE 1.6*   ALKPHOS 45*   *   AST 2,574*   BILITOT 1.6*     Coagulation:   Recent Labs   Lab 03/15/19  1125  03/16/19  0809   INR 1.2  --   --    APTT 23.3   < > 36.5*  36.5*    < > = values in this interval not displayed.       Significant Imaging:  X-Ray: Reviewed    Assessment/Plan:     Elevated LFTs    63 year old male with a history of HTN, HLD, PAF, DM Type 2, Alcohol abuse with hepatic steatosis who Hepatology is following for elevated LFT's felt to be secondary to ischemia. Labs with expected increase in LFT's s/p CPR. At this point would continue to monitor and once patient records will need to address rehab and need for liver biopsy.    Recommendations:  --Daily CMP, CBC, and INR  --F/U cultures  --Continue antibiotics  --Supportive care by ICU team           Thank you for your consult. I will follow-up with patient. Please contact us if you have any additional questions.    Thomas Powell M.D.  Gastroenterology Fellow, PGY-V  Pager: [phone]  [facility]

## 2019-03-16 NOTE — PLAN OF CARE
No acute events throughout shift, VS and assessment per flow sheet.     Patient stable throughout shift. Patient localizes to pain but will not follow commands. Right pupil is a 4 with an irregular border, left pupil is 3 with round border. HR has been sinus bradycardic 50-60. Blood pressure has been stable, pressor titrated for a MAP of 65. Lynn intact and in place with output around 150-300 mL an hour. Breaths sounds are clear bilaterally. Patient ventilated on A/C. No skin breakdown issues.     Patient progressing towards goals as tolerated, plan of care reviewed with Donald Warren Abadie and family, all concerns addressed, will continue to monitor.

## 2019-03-16 NOTE — ASSESSMENT & PLAN NOTE
- sever presentation with agitation, hallucinations, and autonomic instability  - patient currently intubated and mechanically ventilated with continuous IV sedation ordered  - continue versed gtt and propofol gtt

## 2019-03-16 NOTE — SUBJECTIVE & OBJECTIVE
Interval History: NAEON. Patient requiring increased versed gtt and propofol gtt.     Review of Systems   Unable to perform ROS: intubated   Constitution: Negative for chills and fever.   HENT: Negative for ear discharge.    Eyes: Negative for pain and visual disturbance.   Cardiovascular: Positive for palpitations. Negative for chest pain, dyspnea on exertion, irregular heartbeat, leg swelling, orthopnea, paroxysmal nocturnal dyspnea and syncope.   Respiratory: Negative for hemoptysis, shortness of breath and wheezing.    Skin: Negative for rash and suspicious lesions.   Musculoskeletal: Negative for joint pain and muscle weakness.   Gastrointestinal: Negative for abdominal pain, diarrhea, nausea and vomiting.   Genitourinary: Negative for dysuria and frequency.   Neurological: Negative for focal weakness, headaches and tremors.     Objective:     Vital Signs (Most Recent):  Temp: 99.5 °F (37.5 °C) (03/16/19 1115)  Pulse: (!) 54 (03/16/19 1203)  Resp: 16 (03/16/19 1203)  BP: (!) 119/59 (03/16/19 1203)  SpO2: 99 % (03/16/19 1203) Vital Signs (24h Range):  Temp:  [98.6 °F (37 °C)-100.4 °F (38 °C)] 99.5 °F (37.5 °C)  Pulse:  [] 54  Resp:  [14-37] 16  SpO2:  [97 %-100 %] 99 %  BP: ()/(50-95) 119/59     Weight: 86.5 kg (190 lb 11.2 oz)  Body mass index is 30.79 kg/m².     SpO2: 99 %  O2 Device (Oxygen Therapy): ventilator      Intake/Output Summary (Last 24 hours) at 3/16/2019 1214  Last data filed at 3/16/2019 1047  Gross per 24 hour   Intake 2483.04 ml   Output 4185 ml   Net -1701.96 ml       Lines/Drains/Airways     Central Venous Catheter Line                 Introducer 03/15/19 1900 right internal jugular less than 1 day          Drain                 NG/OG Tube 03/15/19 1150 [place] sump 18 Fr. Center mouth 1 day         Urethral Catheter 03/15/19 1206 Double-lumen 16 Fr. 1 day          Airway                 Airway - Non-Surgical 03/15/19 1151 Endotracheal Tube 1 day          Line                 Pacer  Wires 03/15/19 1200 1 day          Peripheral Intravenous Line                 Peripheral IV - Single Lumen 03/15/19 0000 Right Antecubital 1 day         Peripheral IV - Single Lumen 03/15/19 1135 Left Antecubital 1 day         Peripheral IV - Single Lumen 03/15/19 2000 Right Forearm less than 1 day                Physical Exam   Constitutional: He is oriented to person, place, and time. He appears well-developed and well-nourished. He is intubated.   HENT:   Head: Normocephalic and atraumatic.   Eyes: EOM are normal.   Cardiovascular: An irregularly irregular rhythm present. Bradycardia present. Exam reveals no gallop and no friction rub.   Pulmonary/Chest: Effort normal and breath sounds normal. No stridor. He is intubated. He has no wheezes. He has no rales.   Abdominal: Soft. Bowel sounds are normal. There is no rebound and no guarding.   Musculoskeletal: He exhibits no edema.   Neurological: He is alert and oriented to person, place, and time. No cranial nerve deficit.   Skin: Skin is warm and dry.   Psychiatric: He has a normal mood and affect. His behavior is normal.       Significant Labs: All pertinent lab results from the last 24 hours have been reviewed.    Significant Imaging: Reviewed.

## 2019-03-16 NOTE — ASSESSMENT & PLAN NOTE
63 year old male with a history of HTN, HLD, PAF, DM Type 2, Alcohol abuse with hepatic steatosis who Hepatology is following for elevated LFT's felt to be secondary to ischemia. Labs with expected increase in LFT's s/p CPR. At this point would continue to monitor and once patient records will need to address rehab and need for liver biopsy.    Recommendations:  --Daily CMP, CBC, and INR  --F/U cultures  --Continue antibiotics  --Supportive care by ICU team

## 2019-03-16 NOTE — NURSING
[facility] Connected to bedside monitor - cardiac monitoring and continuous pulse oximetry applied.     ET-tube in place and drips infusing. Vitals stable.     Call bell within reach, side rails raised x 2, bed locked and in lowest position. Primary service notified of patient arrival. Patient in no acute distress. Will continue to monitor.

## 2019-03-17 LAB
ALBUMIN SERPL BCP-MCNC: 2.7 G/DL
ALLENS TEST: ABNORMAL
ALP SERPL-CCNC: 47 U/L
ALT SERPL W/O P-5'-P-CCNC: 427 U/L
ANION GAP SERPL CALC-SCNC: 11 MMOL/L
ANION GAP SERPL CALC-SCNC: 13 MMOL/L
APTT BLDCRRT: 22.1 SEC
APTT BLDCRRT: 35.7 SEC
APTT BLDCRRT: 41.4 SEC
AST SERPL-CCNC: 1232 U/L
BASOPHILS # BLD AUTO: 0.04 K/UL
BASOPHILS NFR BLD: 0.8 %
BILIRUB SERPL-MCNC: 1.6 MG/DL
BUN SERPL-MCNC: 4 MG/DL
BUN SERPL-MCNC: 6 MG/DL
CA-I BLDV-SCNC: 0.84 MMOL/L
CALCIUM SERPL-MCNC: 7.9 MG/DL
CALCIUM SERPL-MCNC: 8 MG/DL
CHLORIDE SERPL-SCNC: 101 MMOL/L
CHLORIDE SERPL-SCNC: 101 MMOL/L
CO2 SERPL-SCNC: 22 MMOL/L
CO2 SERPL-SCNC: 23 MMOL/L
CREAT SERPL-MCNC: 1 MG/DL
CREAT SERPL-MCNC: 1.1 MG/DL
DELSYS: ABNORMAL
DIFFERENTIAL METHOD: ABNORMAL
EOSINOPHIL # BLD AUTO: 0.1 K/UL
EOSINOPHIL NFR BLD: 2.5 %
ERYTHROCYTE [DISTWIDTH] IN BLOOD BY AUTOMATED COUNT: 13.2 %
ERYTHROCYTE [SEDIMENTATION RATE] IN BLOOD BY WESTERGREN METHOD: 16 MM/H
EST. GFR  (AFRICAN AMERICAN): >60 ML/MIN/1.73 M^2
EST. GFR  (AFRICAN AMERICAN): >60 ML/MIN/1.73 M^2
EST. GFR  (NON AFRICAN AMERICAN): >60 ML/MIN/1.73 M^2
EST. GFR  (NON AFRICAN AMERICAN): >60 ML/MIN/1.73 M^2
FIO2: 40
GLUCOSE SERPL-MCNC: 149 MG/DL
GLUCOSE SERPL-MCNC: 159 MG/DL
HCO3 UR-SCNC: 22.5 MMOL/L (ref 24–28)
HCT VFR BLD AUTO: 34.2 %
HGB BLD-MCNC: 11.4 G/DL
IMM GRANULOCYTES # BLD AUTO: 0.04 K/UL
IMM GRANULOCYTES NFR BLD AUTO: 0.8 %
INR PPP: 1.1
LACTATE SERPL-SCNC: 0.7 MMOL/L
LACTATE SERPL-SCNC: 0.8 MMOL/L
LACTATE SERPL-SCNC: 0.9 MMOL/L
LYMPHOCYTES # BLD AUTO: 1 K/UL
LYMPHOCYTES NFR BLD: 20.9 %
MAGNESIUM SERPL-MCNC: 1.4 MG/DL
MAGNESIUM SERPL-MCNC: 2.1 MG/DL
MAGNESIUM SERPL-MCNC: 2.1 MG/DL
MCH RBC QN AUTO: 34.2 PG
MCHC RBC AUTO-ENTMCNC: 33.3 G/DL
MCV RBC AUTO: 103 FL
MIN VOL: 8.24
MODE: ABNORMAL
MONOCYTES # BLD AUTO: 0.3 K/UL
MONOCYTES NFR BLD: 5.2 %
NEUTROPHILS # BLD AUTO: 3.3 K/UL
NEUTROPHILS NFR BLD: 69.8 %
NRBC BLD-RTO: 0 /100 WBC
PCO2 BLDA: 34.9 MMHG (ref 35–45)
PEEP: 5
PH SMN: 7.42 [PH] (ref 7.35–7.45)
PHOSPHATE SERPL-MCNC: 1.1 MG/DL
PIP: 23
PLATELET # BLD AUTO: 76 K/UL
PMV BLD AUTO: 9.6 FL
PO2 BLDA: 185 MMHG (ref 80–100)
POC BE: -2 MMOL/L
POC SATURATED O2: 100 % (ref 95–100)
POC TCO2: 24 MMOL/L (ref 23–27)
POTASSIUM SERPL-SCNC: 2.9 MMOL/L
POTASSIUM SERPL-SCNC: 3.8 MMOL/L
PROT SERPL-MCNC: 5.6 G/DL
PROTHROMBIN TIME: 11.3 SEC
RBC # BLD AUTO: 3.33 M/UL
SAMPLE: ABNORMAL
SITE: ABNORMAL
SODIUM SERPL-SCNC: 135 MMOL/L
SODIUM SERPL-SCNC: 136 MMOL/L
SP02: 100
VT: 500
WBC # BLD AUTO: 4.78 K/UL

## 2019-03-17 PROCEDURE — 99233 PR SUBSEQUENT HOSPITAL CARE,LEVL III: ICD-10-PCS | Mod: ,,, | Performed by: INTERNAL MEDICINE

## 2019-03-17 PROCEDURE — 80048 BASIC METABOLIC PNL TOTAL CA: CPT

## 2019-03-17 PROCEDURE — 25000003 PHARM REV CODE 250: Performed by: STUDENT IN AN ORGANIZED HEALTH CARE EDUCATION/TRAINING PROGRAM

## 2019-03-17 PROCEDURE — 85610 PROTHROMBIN TIME: CPT

## 2019-03-17 PROCEDURE — C1751 CATH, INF, PER/CENT/MIDLINE: HCPCS

## 2019-03-17 PROCEDURE — 85730 THROMBOPLASTIN TIME PARTIAL: CPT | Mod: 91

## 2019-03-17 PROCEDURE — 80053 COMPREHEN METABOLIC PANEL: CPT

## 2019-03-17 PROCEDURE — 36415 COLL VENOUS BLD VENIPUNCTURE: CPT

## 2019-03-17 PROCEDURE — 27000221 HC OXYGEN, UP TO 24 HOURS

## 2019-03-17 PROCEDURE — 82803 BLOOD GASES ANY COMBINATION: CPT

## 2019-03-17 PROCEDURE — 63600175 PHARM REV CODE 636 W HCPCS: Performed by: STUDENT IN AN ORGANIZED HEALTH CARE EDUCATION/TRAINING PROGRAM

## 2019-03-17 PROCEDURE — 99231 PR SUBSEQUENT HOSPITAL CARE,LEVL I: ICD-10-PCS | Mod: ,,, | Performed by: INTERNAL MEDICINE

## 2019-03-17 PROCEDURE — 84100 ASSAY OF PHOSPHORUS: CPT

## 2019-03-17 PROCEDURE — 20000000 HC ICU ROOM

## 2019-03-17 PROCEDURE — 83605 ASSAY OF LACTIC ACID: CPT | Mod: 91

## 2019-03-17 PROCEDURE — 99900035 HC TECH TIME PER 15 MIN (STAT)

## 2019-03-17 PROCEDURE — 27200188 HC TRANSDUCER, ART ADULT/PEDS

## 2019-03-17 PROCEDURE — 94003 VENT MGMT INPAT SUBQ DAY: CPT

## 2019-03-17 PROCEDURE — 83735 ASSAY OF MAGNESIUM: CPT | Mod: 91

## 2019-03-17 PROCEDURE — 82330 ASSAY OF CALCIUM: CPT

## 2019-03-17 PROCEDURE — 83735 ASSAY OF MAGNESIUM: CPT

## 2019-03-17 PROCEDURE — 85025 COMPLETE CBC W/AUTO DIFF WBC: CPT

## 2019-03-17 PROCEDURE — 83605 ASSAY OF LACTIC ACID: CPT

## 2019-03-17 PROCEDURE — C9113 INJ PANTOPRAZOLE SODIUM, VIA: HCPCS | Performed by: INTERNAL MEDICINE

## 2019-03-17 PROCEDURE — 94761 N-INVAS EAR/PLS OXIMETRY MLT: CPT

## 2019-03-17 PROCEDURE — 85730 THROMBOPLASTIN TIME PARTIAL: CPT

## 2019-03-17 PROCEDURE — 99231 SBSQ HOSP IP/OBS SF/LOW 25: CPT | Mod: ,,, | Performed by: INTERNAL MEDICINE

## 2019-03-17 PROCEDURE — 25000003 PHARM REV CODE 250: Performed by: INTERNAL MEDICINE

## 2019-03-17 PROCEDURE — 99233 SBSQ HOSP IP/OBS HIGH 50: CPT | Mod: ,,, | Performed by: INTERNAL MEDICINE

## 2019-03-17 PROCEDURE — 99900026 HC AIRWAY MAINTENANCE (STAT)

## 2019-03-17 PROCEDURE — 36556 INSERT NON-TUNNEL CV CATH: CPT

## 2019-03-17 PROCEDURE — 63600175 PHARM REV CODE 636 W HCPCS: Performed by: INTERNAL MEDICINE

## 2019-03-17 PROCEDURE — 36600 WITHDRAWAL OF ARTERIAL BLOOD: CPT

## 2019-03-17 RX ORDER — SODIUM CHLORIDE 9 MG/ML
INJECTION, SOLUTION INTRAVENOUS CONTINUOUS
Status: DISCONTINUED | OUTPATIENT
Start: 2019-03-17 | End: 2019-03-19

## 2019-03-17 RX ORDER — THIAMINE HCL 100 MG
100 TABLET ORAL DAILY
Status: DISCONTINUED | OUTPATIENT
Start: 2019-03-17 | End: 2019-03-22

## 2019-03-17 RX ORDER — METOPROLOL TARTRATE 25 MG/1
25 TABLET, FILM COATED ORAL 2 TIMES DAILY
Status: DISCONTINUED | OUTPATIENT
Start: 2019-03-17 | End: 2019-03-17

## 2019-03-17 RX ORDER — POTASSIUM CHLORIDE 29.8 MG/ML
40 INJECTION INTRAVENOUS ONCE
Status: COMPLETED | OUTPATIENT
Start: 2019-03-17 | End: 2019-03-17

## 2019-03-17 RX ORDER — CEFTRIAXONE 1 G/1
1 INJECTION, POWDER, FOR SOLUTION INTRAMUSCULAR; INTRAVENOUS
Status: DISCONTINUED | OUTPATIENT
Start: 2019-03-17 | End: 2019-03-19

## 2019-03-17 RX ORDER — POTASSIUM CHLORIDE 20 MEQ/15ML
40 SOLUTION ORAL ONCE
Status: COMPLETED | OUTPATIENT
Start: 2019-03-17 | End: 2019-03-17

## 2019-03-17 RX ORDER — POTASSIUM CHLORIDE 14.9 MG/ML
20 INJECTION INTRAVENOUS ONCE
Status: COMPLETED | OUTPATIENT
Start: 2019-03-17 | End: 2019-03-17

## 2019-03-17 RX ADMIN — POTASSIUM PHOSPHATE, MONOBASIC AND POTASSIUM PHOSPHATE, DIBASIC 20 MMOL: 224; 236 INJECTION, SOLUTION, CONCENTRATE INTRAVENOUS at 03:03

## 2019-03-17 RX ADMIN — HEPARIN SODIUM AND DEXTROSE 17 UNITS/KG/HR: 10000; 5 INJECTION INTRAVENOUS at 03:03

## 2019-03-17 RX ADMIN — MIDAZOLAM HYDROCHLORIDE 3 MG/HR: 5 INJECTION, SOLUTION INTRAMUSCULAR; INTRAVENOUS at 05:03

## 2019-03-17 RX ADMIN — CALCIUM GLUCONATE 1 G: 94 INJECTION, SOLUTION INTRAVENOUS at 08:03

## 2019-03-17 RX ADMIN — PROPOFOL 45 MCG/KG/MIN: 10 INJECTION, EMULSION INTRAVENOUS at 04:03

## 2019-03-17 RX ADMIN — PROPOFOL 45 MCG/KG/MIN: 10 INJECTION, EMULSION INTRAVENOUS at 12:03

## 2019-03-17 RX ADMIN — POTASSIUM CHLORIDE 40 MEQ: 400 INJECTION, SOLUTION INTRAVENOUS at 04:03

## 2019-03-17 RX ADMIN — PROPOFOL 60 MCG/KG/MIN: 10 INJECTION, EMULSION INTRAVENOUS at 12:03

## 2019-03-17 RX ADMIN — Medication 0.04 MCG/KG/MIN: at 03:03

## 2019-03-17 RX ADMIN — ASPIRIN 81 MG CHEWABLE TABLET 81 MG: 81 TABLET CHEWABLE at 09:03

## 2019-03-17 RX ADMIN — POTASSIUM CHLORIDE 20 MEQ: 200 INJECTION, SOLUTION INTRAVENOUS at 08:03

## 2019-03-17 RX ADMIN — PROPOFOL 50 MCG/KG/MIN: 10 INJECTION, EMULSION INTRAVENOUS at 06:03

## 2019-03-17 RX ADMIN — POTASSIUM CHLORIDE 40 MEQ: 400 INJECTION, SOLUTION INTRAVENOUS at 06:03

## 2019-03-17 RX ADMIN — PROPOFOL 60 MCG/KG/MIN: 10 INJECTION, EMULSION INTRAVENOUS at 03:03

## 2019-03-17 RX ADMIN — SODIUM CHLORIDE: 0.9 INJECTION, SOLUTION INTRAVENOUS at 11:03

## 2019-03-17 RX ADMIN — CEFEPIME 1 G: 1 INJECTION, POWDER, FOR SOLUTION INTRAMUSCULAR; INTRAVENOUS at 01:03

## 2019-03-17 RX ADMIN — MAGNESIUM SULFATE HEPTAHYDRATE 3 G: 500 INJECTION, SOLUTION INTRAMUSCULAR; INTRAVENOUS at 06:03

## 2019-03-17 RX ADMIN — CHLORHEXIDINE GLUCONATE 0.12% ORAL RINSE 15 ML: 1.2 LIQUID ORAL at 09:03

## 2019-03-17 RX ADMIN — CEFTRIAXONE SODIUM 1 G: 1 INJECTION, POWDER, FOR SOLUTION INTRAMUSCULAR; INTRAVENOUS at 12:03

## 2019-03-17 RX ADMIN — POTASSIUM CHLORIDE 40 MEQ: 20 SOLUTION ORAL at 06:03

## 2019-03-17 RX ADMIN — PANTOPRAZOLE SODIUM 40 MG: 40 INJECTION, POWDER, FOR SOLUTION INTRAVENOUS at 10:03

## 2019-03-17 RX ADMIN — Medication 100 MG: at 10:03

## 2019-03-17 RX ADMIN — ALLOPURINOL 200 MG: 100 TABLET ORAL at 09:03

## 2019-03-17 RX ADMIN — CHLORHEXIDINE GLUCONATE 0.12% ORAL RINSE 15 ML: 1.2 LIQUID ORAL at 10:03

## 2019-03-17 NOTE — ASSESSMENT & PLAN NOTE
- AGAP present prior to cardiac arrest  - lactic acid after arrest is 8.1  - ED has administered more isotonic fluid  - optimize cardiac profile  - obtain BCx x2, UA with reflex UCx  - administer broad spectrum antibiotics  - Blood cx NGTD  - Vancomycin and cefepime deescalated to Ceftriaxone

## 2019-03-17 NOTE — NURSING
Spoke with Dr. Lagos with Cardiology per pt's daughter, Chani, request regarding home medication flecainide for afib.  MD states pt in afib but is rate controlled.  Not needed at this time.  Daughter updated on POC by Dr. Lagos and agrees.

## 2019-03-17 NOTE — PLAN OF CARE
Problem: Adult Inpatient Plan of Care  Goal: Plan of Care Review  Outcome: Ongoing (interventions implemented as appropriate)  No acute events throughout shift, VS and assessment per flow sheet, patient progressing towards goals as tolerated. Patient remains intubated and sedated, withdraws on all extremities. No signs of tremors, the only evidence of DT is some sweating to face. Central line placed, levo continues to infuse at low rate. Propfol, versad, fluids, and heparin infusing. Lynn with good UO, no BM. Tmax 99. Daughter remains at bedside. Plan of care reviewed with Donald Warren Abadie and family, all concerns addressed, will continue to monitor.

## 2019-03-17 NOTE — NURSING
Spoke with Dr. Lagos with Cardiology. No tube feedings to be started at this time.  Likely to start SBTs tomorrow to gear towards extubation. SPL eval following.

## 2019-03-17 NOTE — SUBJECTIVE & OBJECTIVE
Interval History:   No acute events overnight.    Current Facility-Administered Medications   Medication    0.9%  NaCl infusion    allopurinol tablet 200 mg    aspirin chewable tablet 81 mg    calcium gluconate 1g in dextrose 5% 100mL (ready to mix system)    calcium gluconate 1g in dextrose 5% 100mL (ready to mix system)    calcium gluconate 1g in dextrose 5% 100mL (ready to mix system)    cefTRIAXone injection 1 g    chlorhexidine 0.12 % solution 15 mL    heparin 25,000 units in dextrose 5% (100 units/ml) IV bolus from bag - ADDITIONAL PRN BOLUS - 30 units/kg    heparin 25,000 units in dextrose 5% (100 units/ml) IV bolus from bag - ADDITIONAL PRN BOLUS - 60 units/kg    heparin 25,000 units in dextrose 5% 250 mL (100 units/mL) infusion LOW INTENSITY nomogram - [facility]    lorazepam injection 2 mg    magnesium sulfate 2g in water 50mL IVPB (premix)    magnesium sulfate 2g in water 50mL IVPB (premix)    midazolam (VERSED) 1 mg/mL in dextrose 5 % 100 mL infusion (titrating)    norepinephrine 4 mg in dextrose 5% 250 mL infusion (premix) (titrating)    pantoprazole injection 40 mg    potassium chloride 40 mEq in 100 mL IVPB (FOR CENTRAL LINE ADMINISTRATION ONLY)    And    potassium chloride 20 mEq in 100 mL IVPB (FOR CENTRAL LINE ADMINISTRATION ONLY)    And    potassium chloride 40 mEq in 100 mL IVPB (FOR CENTRAL LINE ADMINISTRATION ONLY)    propofol (DIPRIVAN) 10 mg/mL infusion    sodium chloride 0.9% flush 3 mL    thiamine tablet 100 mg       Objective:     Vital Signs (Most Recent):  Temp: 97.5 °F (36.4 °C) (03/17/19 1115)  Pulse: 84 (03/17/19 1310)  Resp: 16 (03/17/19 1310)  BP: (!) 89/52 (03/17/19 1215)  SpO2: 100 % (03/17/19 1310) Vital Signs (24h Range):  Temp:  [97.5 °F (36.4 °C)-99 °F (37.2 °C)] 97.5 °F (36.4 °C)  Pulse:  [] 84  Resp:  [16-27] 16  SpO2:  [88 %-100 %] 100 %  BP: ()/(50-88) 89/52     Weight: 86.5 kg (190 lb 11.2 oz) (03/16/19 0106)  Body mass index is 30.79  kg/m².    Physical Exam   Constitutional: No distress.   HENT:   Head: Normocephalic and atraumatic.   Eyes: No scleral icterus.   Cardiovascular: Normal rate and regular rhythm.   Pulmonary/Chest: Effort normal and breath sounds normal.   Abdominal: Soft. Bowel sounds are normal.   Musculoskeletal: He exhibits no edema.   Neurological:   intubated   Skin: He is not diaphoretic.       MELD-Na score: 9 at 3/17/2019 12:10 PM  MELD score: 9 at 3/17/2019 12:10 PM  Calculated from:  Serum Creatinine: 1 mg/dL at 3/17/2019 12:10 PM  Serum Sodium: 135 mmol/L at 3/17/2019 12:10 PM  Total Bilirubin: 1.6 mg/dL at 3/17/2019  3:03 AM  INR(ratio): 1.1 at 3/17/2019  3:03 AM  Age: 63 years    Significant Labs:  CBC:   Recent Labs   Lab 03/17/19  0531   WBC 4.78   RBC 3.33*   HGB 11.4*   HCT 34.2*   PLT 76*     CMP:   Recent Labs   Lab 03/17/19  0303 03/17/19  1210   * 159*   CALCIUM 7.9* 8.0*   ALBUMIN 2.7*  --    PROT 5.6*  --     135*   K 2.9* 3.8   CO2 22* 23    101   BUN 6* 4*   CREATININE 1.1 1.0   ALKPHOS 47*  --    *  --    AST 1,232*  --    BILITOT 1.6*  --      Coagulation:   Recent Labs   Lab 03/17/19  0303 03/17/19  0912   INR 1.1  --    APTT 22.1 35.7*       Significant Imaging:  X-Ray: Reviewed  US: Reviewed

## 2019-03-17 NOTE — PROGRESS NOTES
[facility]  Hepatology  Progress Note    Patient Name: Donald Warren Abadie  MRN: [id number]  Admission Date: 3/15/2019  Hospital Length of Stay: 2 days  Attending Provider: Luis Miguel Lagos MD   Primary Care Physician: Bacilio Larry MD  Principal Problem:Alcohol withdrawal    Subjective:     HPI: 63 year old male with a history of HTN, HLD, PAF, DM Type 2, Alcohol abuse with hepatic steatosis on who Hepatology is being consulted for elevated LFT's.    Patient known to Hepatology as he was following in clinic for NAFLD possibly secondary to diabetes vs alcohol with plans to obtain a liver biopsy in order to determine degree of fibrosis.  Per daughter he drinks 1 case of beer per day. In the last week however was trying to cut back. Yesterday he had 7 days and earlier in the week had another 2 beers. She thinks that he could have been withdrawing and took additional heart medication in hopes of feeling better. She states that today she was extremely short of breath, diaphoretic with a pulse of 20 when EMS arrived to pick him up. In the ED he was hypotensive and bradycardic. Labs were significant for hyponatremia, JASS, elevated LFT's, thrombocytopenia, and normal INR. He received atropine, calcium, and glucagon. Unfortunately he coded, ROSC was achieved but he remains intubated with a trans-venous pacer.     Interval History:   No acute events overnight.    Current Facility-Administered Medications   Medication    0.9%  NaCl infusion    allopurinol tablet 200 mg    aspirin chewable tablet 81 mg    calcium gluconate 1g in dextrose 5% 100mL (ready to mix system)    calcium gluconate 1g in dextrose 5% 100mL (ready to mix system)    calcium gluconate 1g in dextrose 5% 100mL (ready to mix system)    cefTRIAXone injection 1 g    chlorhexidine 0.12 % solution 15 mL    heparin 25,000 units in dextrose 5% (100 units/ml) IV bolus from bag - ADDITIONAL PRN BOLUS - 30 units/kg    heparin 25,000  units in dextrose 5% (100 units/ml) IV bolus from bag - ADDITIONAL PRN BOLUS - 60 units/kg    heparin 25,000 units in dextrose 5% 250 mL (100 units/mL) infusion LOW INTENSITY nomogram - [facility]    lorazepam injection 2 mg    magnesium sulfate 2g in water 50mL IVPB (premix)    magnesium sulfate 2g in water 50mL IVPB (premix)    midazolam (VERSED) 1 mg/mL in dextrose 5 % 100 mL infusion (titrating)    norepinephrine 4 mg in dextrose 5% 250 mL infusion (premix) (titrating)    pantoprazole injection 40 mg    potassium chloride 40 mEq in 100 mL IVPB (FOR CENTRAL LINE ADMINISTRATION ONLY)    And    potassium chloride 20 mEq in 100 mL IVPB (FOR CENTRAL LINE ADMINISTRATION ONLY)    And    potassium chloride 40 mEq in 100 mL IVPB (FOR CENTRAL LINE ADMINISTRATION ONLY)    propofol (DIPRIVAN) 10 mg/mL infusion    sodium chloride 0.9% flush 3 mL    thiamine tablet 100 mg       Objective:     Vital Signs (Most Recent):  Temp: 97.5 °F (36.4 °C) (03/17/19 1115)  Pulse: 84 (03/17/19 1310)  Resp: 16 (03/17/19 1310)  BP: (!) 89/52 (03/17/19 1215)  SpO2: 100 % (03/17/19 1310) Vital Signs (24h Range):  Temp:  [97.5 °F (36.4 °C)-99 °F (37.2 °C)] 97.5 °F (36.4 °C)  Pulse:  [] 84  Resp:  [16-27] 16  SpO2:  [88 %-100 %] 100 %  BP: ()/(50-88) 89/52     Weight: 86.5 kg (190 lb 11.2 oz) (03/16/19 0106)  Body mass index is 30.79 kg/m².    Physical Exam   Constitutional: No distress.   HENT:   Head: Normocephalic and atraumatic.   Eyes: No scleral icterus.   Cardiovascular: Normal rate and regular rhythm.   Pulmonary/Chest: Effort normal and breath sounds normal.   Abdominal: Soft. Bowel sounds are normal.   Musculoskeletal: He exhibits no edema.   Neurological:   intubated   Skin: He is not diaphoretic.       MELD-Na score: 9 at 3/17/2019 12:10 PM  MELD score: 9 at 3/17/2019 12:10 PM  Calculated from:  Serum Creatinine: 1 mg/dL at 3/17/2019 12:10 PM  Serum Sodium: 135 mmol/L at 3/17/2019 12:10 PM  Total Bilirubin: 1.6  mg/dL at 3/17/2019  3:03 AM  INR(ratio): 1.1 at 3/17/2019  3:03 AM  Age: 63 years    Significant Labs:  CBC:   Recent Labs   Lab 03/17/19  0531   WBC 4.78   RBC 3.33*   HGB 11.4*   HCT 34.2*   PLT 76*     CMP:   Recent Labs   Lab 03/17/19  0303 03/17/19  1210   * 159*   CALCIUM 7.9* 8.0*   ALBUMIN 2.7*  --    PROT 5.6*  --     135*   K 2.9* 3.8   CO2 22* 23    101   BUN 6* 4*   CREATININE 1.1 1.0   ALKPHOS 47*  --    *  --    AST 1,232*  --    BILITOT 1.6*  --      Coagulation:   Recent Labs   Lab 03/17/19  0303 03/17/19  0912   INR 1.1  --    APTT 22.1 35.7*       Significant Imaging:  X-Ray: Reviewed  US: Reviewed    Assessment/Plan:     Elevated LFTs    63 year old male with a history of HTN, HLD, PAF, DM Type 2, Alcohol abuse with hepatic steatosis who Hepatology is following for elevated LFT's felt to be secondary to ischemia. LFT's improving therefore recommend continuing supportive care by ICU team.    Recommendations:  --Daily CMP, CBC, and INR  --Supportive care by ICU team           Thank you for your consult. I will follow-up with patient. Please contact us if you have any additional questions.    Thomas Powell M.D.  Gastroenterology Fellow, PGY-V  Pager: [phone]  [facility]

## 2019-03-17 NOTE — ASSESSMENT & PLAN NOTE
- sever presentation with agitation, hallucinations, and autonomic instability  - patient currently intubated and mechanically ventilated with continuous IV sedation ordered  - continue versed gtt and propofol gtt  - Wean down versed gtt as tolerated

## 2019-03-17 NOTE — SUBJECTIVE & OBJECTIVE
Interval History: NAEON. Central line placed overnight.     Review of Systems   Unable to perform ROS: intubated     Objective:     Vital Signs (Most Recent):  Temp: 98.3 °F (36.8 °C) (03/17/19 0730)  Pulse: 85 (03/17/19 0900)  Resp: 16 (03/17/19 0900)  BP: 99/67 (03/17/19 0900)  SpO2: 100 % (03/17/19 0900) Vital Signs (24h Range):  Temp:  [98.3 °F (36.8 °C)-99.5 °F (37.5 °C)] 98.3 °F (36.8 °C)  Pulse:  [] 85  Resp:  [16-27] 16  SpO2:  [88 %-100 %] 100 %  BP: ()/(50-88) 99/67     Weight: 86.5 kg (190 lb 11.2 oz)  Body mass index is 30.79 kg/m².     SpO2: 100 %  O2 Device (Oxygen Therapy): ventilator      Intake/Output Summary (Last 24 hours) at 3/17/2019 0916  Last data filed at 3/17/2019 0900  Gross per 24 hour   Intake 5998.94 ml   Output 4615 ml   Net 1383.94 ml       Lines/Drains/Airways     Central Venous Catheter Line                 Percutaneous Central Line Insertion/Assessment - triple lumen  03/17/19 0300 right internal jugular less than 1 day          Drain                 NG/OG Tube 03/15/19 1150 [place] sump 18 Fr. Center mouth 1 day         Urethral Catheter 03/15/19 1206 Double-lumen 16 Fr. 1 day          Airway                 Airway - Non-Surgical 03/15/19 1151 Endotracheal Tube 1 day          Peripheral Intravenous Line                 Peripheral IV - Single Lumen 03/15/19 0000 Right Antecubital 2 days         Peripheral IV - Single Lumen 03/15/19 1135 Left Antecubital 1 day         Peripheral IV - Single Lumen 03/15/19 2000 Right Forearm 1 day                Physical Exam   Constitutional: He is oriented to person, place, and time. He appears well-developed and well-nourished. He is intubated.   HENT:   Head: Normocephalic and atraumatic.   Eyes: EOM are normal.   Cardiovascular: Normal rate. An irregularly irregular rhythm present. Exam reveals no gallop and no friction rub.   Pulmonary/Chest: Effort normal and breath sounds normal. No stridor. He is intubated. He has no wheezes. He has  no rales.   Abdominal: Soft. Bowel sounds are normal. There is no rebound and no guarding.   Musculoskeletal: He exhibits no edema.   Neurological: He is alert and oriented to person, place, and time. No cranial nerve deficit.   Skin: Skin is warm and dry.   Psychiatric: He has a normal mood and affect. His behavior is normal.   Vitals reviewed.      Significant Labs: All pertinent lab results from the last 24 hours have been reviewed.    Significant Imaging: Reviewed.

## 2019-03-17 NOTE — PROCEDURES
"Donald Warren Abadie is a 63 y.o. male patient.    Temp: 98.8 °F (37.1 °C) (03/16/19 2301)  Pulse: 97 (03/17/19 0111)  Resp: 16 (03/17/19 0100)  BP: 106/60 (03/17/19 0100)  SpO2: 100 % (03/17/19 0111)  Weight: 86.5 kg (190 lb 11.2 oz) (03/16/19 0106)  Height: 5' 5.98" (167.6 cm) (03/16/19 0106)       Central Line  Date/Time: 3/17/2019 2:21 AM  Location procedure was performed: [facility] CARDIOLOGY  Performed by: Vega Youssef MD  Consent Done: Yes  Time out: Immediately prior to procedure a "time out" was called to verify the correct patient, procedure, equipment, support staff and site/side marked as required.  Indications: med administration, vascular access and hemodynamic monitoring  Anesthesia: local infiltration    Anesthesia:  Local Anesthetic: lidocaine 1% without epinephrine  Anesthetic total: 1 mL  Preparation: skin prepped with chlorhexidine (without alcohol)  Skin prep agent dried: skin prep agent completely dried prior to procedure  Sterile barriers: all five maximum sterile barriers used - cap, mask, sterile gown, sterile gloves, and large sterile sheet  Hand hygiene: hand hygiene performed prior to central venous catheter insertion  Location details: right internal jugular  Catheter type: triple lumen  Catheter size: 7 Fr  Ultrasound guidance: yes  Vessel Caliber: medium, patent, compressibility normal  Vascular Doppler: flow caudad  Needle advanced into vessel with real time Ultrasound guidance.  Guidewire confirmed in vessel.  Sterile sheath used.  Manometry: Yes  Number of attempts: 1  Assessment: placement verified by x-ray,  no pneumothorax on x-ray,  tip termination and successful placement  Complications: none  Estimated blood loss (mL): 1  Specimens: No  Implants: No  Post-procedure: line sutured,  chlorhexidine patch,  sterile dressing applied and blood return through all ports  Complications: No      Consent provided by daughter (per daughter, patient not , has only " one child [daughter]). Patient was intubated and sedated by the time of need of the central line.    CVC was introduced all 16 cm into the skin and sutured at that length    Vega Correa  3/17/2019

## 2019-03-17 NOTE — ASSESSMENT & PLAN NOTE
- prominent features include hypotension and bradycardia  - ECG with bradyarrhythmia; patient received fluid in the ED  - IV atropine, IV glucagon (bolus followed by gtt) and IV calcium  - TVP initially discussed with Dr. Alem Miller: recommended atropine 1 mg IV  - TVP placed post-ROSC with base rate set at 60 BPM  - TVP now pulled  - f/u TTE with CFD  - hold BB, CCB and flecainide  - Glucagon gtt discontinued. Wean off pressor today and re-start BB tomorrow as HR tolerates.

## 2019-03-17 NOTE — ASSESSMENT & PLAN NOTE
63 year old male with a history of HTN, HLD, PAF, DM Type 2, Alcohol abuse with hepatic steatosis who Hepatology is following for elevated LFT's felt to be secondary to ischemia. LFT's improving therefore recommend continuing supportive care by ICU team.    Recommendations:  --Daily CMP, CBC, and INR  --Supportive care by ICU team

## 2019-03-17 NOTE — PROCEDURES
EXTENDED  ELECTROENCEPHALOGRAM  REPORT    Donald Warren Abadie  [id number]  1955    DATE OF SERVICE: 3/15/19    DATE OF ADMISSION: 3/15/2019  8:08 AM    ADMITTING/REQUESTING PROVIDER: Luis Miguel Lagos MD    REASON FOR CONSULT: 62yo M s/p cardiac arrest    METHODOLOGY   Electroencephalographic (EEG) recording is with electrodes placed according to the International 10-20 placement system.  Thirty two (32) channels of digital signal (sampling rate of 512/sec) including T1 and T2 was simultaneously recorded from the scalp and may include  EKG, EMG, and/or eye monitors.  Recording band pass was 0.1 to 512 hz.  Digital video recording of the patient is simultaneously recorded with the EEG.  The patient is instructed report clinical symptoms which may occur during the recording session.  EEG and video recording is stored and archived in digital format.  Activation procedures which include photic stimulation, hyperventilation and instructing patients to perform simple task are done in selected patients.   The EEG is displayed on a monitor screen and can be reviewed using different montages.  Computer assisted analysis is employed to detect spike and electrographic seizure activity.   The entire record is submitted for computer analysis.  The entire recording is visually reviewed and the times identified by computer analysis as being spikes or seizures are reviewed again.  Compresses spectral analysis (CSA) is also performed on the activity recorded from each individual channel.  This is displayed as a power display of frequencies from 0 to 30 Hz over time.   The CSA is reviewed looking for asymmetries in power between homologous areas of the scalp and then compared with the original EEG recording.     [employer] software was also utilized in the review of this study.  This software suite analyzes the EEG recording in multiple domains.  Coherence and rhythmicity is computed to identify EEG sections which may contain  organized seizures.  Each channel undergoes analysis to detect presence of spike and sharp waves which have special and morphological characteristic of epileptic activity.  The routine EEG recording is converted from spacial into frequency domain.  This is then displayed comparing homologous areas to identify areas of significant asymmetry.  Algorithm to identify non-cortically generated artifact is used to separate eye movement, EMG and other artifact from the EEG.      RECORDING TIMES  Start on 3/15/19, 14:56  Stop on 3/15/19, 16:01    A total of 1 hour and 5 minutes of EEG recording was obtained.    EEG FINDINGS  Background activity:   The background rhythm was characterized by low amplitude (<10uV) theta-alpha (7-9 Hz) with prominent beta activity   No posterior dominant rhythm seen.   Symmetry and continuity: the background was continuous and symmetric    Sleep:   Not seen.    Activation procedures:   Hyperventilation and photic stimulation were not performed     Abnormal activity:   No epileptiform discharges, periodic discharges, lateralized rhythmic delta activity or electrographic seizures were seen.  Irregular heart rate noted on EKG.    IMPRESSION:   This is an abnormal extended (1 hour and 5 minutes) EEG due to the low amplitude mostly beta activity noted, suggestive of medication effect.  No epileptiform activity or electrographic seizures seen.  Irregular heart rate noted on EKG.    CLINICAL CORRELATION IS RECOMMENDED    Gloria Sellers MD, JOHN PAUL(), MARIO GU.  Neurology-Epilepsy.  [facility].

## 2019-03-17 NOTE — ASSESSMENT & PLAN NOTE
- not explained by hyperglycemia and not on diuretics  - in the setting of JASS; ED administered isotonic fluid  - continue to monitor  - Improving with IV fluid administration

## 2019-03-17 NOTE — PROGRESS NOTES
[facility]  Cardiology  Progress Note    Patient Name: Donald Warren Abadie  MRN: [id number]  Admission Date: 3/15/2019  Hospital Length of Stay: 2 days  Code Status: Full Code   Attending Physician: Luis Miguel Lagos MD   Primary Care Physician: Bacilio Larry MD  Expected Discharge Date:   Principal Problem:Alcohol withdrawal    Subjective:     Hospital Course:   Patient coded in ED, subsequently ROSC achieved and intubated. TVP placed in sterile fashion via the right IJ vein post-ROSC. Placed on versed gtt and propofol gtt. Vancomycin and cefepime started as concern for septic shock. TVP pulled on 3/16.         Interval History: NAEON. Central line placed overnight.     Review of Systems   Unable to perform ROS: intubated     Objective:     Vital Signs (Most Recent):  Temp: 98.3 °F (36.8 °C) (03/17/19 0730)  Pulse: 85 (03/17/19 0900)  Resp: 16 (03/17/19 0900)  BP: 99/67 (03/17/19 0900)  SpO2: 100 % (03/17/19 0900) Vital Signs (24h Range):  Temp:  [98.3 °F (36.8 °C)-99.5 °F (37.5 °C)] 98.3 °F (36.8 °C)  Pulse:  [] 85  Resp:  [16-27] 16  SpO2:  [88 %-100 %] 100 %  BP: ()/(50-88) 99/67     Weight: 86.5 kg (190 lb 11.2 oz)  Body mass index is 30.79 kg/m².     SpO2: 100 %  O2 Device (Oxygen Therapy): ventilator      Intake/Output Summary (Last 24 hours) at 3/17/2019 0916  Last data filed at 3/17/2019 0900  Gross per 24 hour   Intake 5998.94 ml   Output 4615 ml   Net 1383.94 ml       Lines/Drains/Airways     Central Venous Catheter Line                 Percutaneous Central Line Insertion/Assessment - triple lumen  03/17/19 0300 right internal jugular less than 1 day          Drain                 NG/OG Tube 03/15/19 1150 [place] sump 18 Fr. Center mouth 1 day         Urethral Catheter 03/15/19 1206 Double-lumen 16 Fr. 1 day          Airway                 Airway - Non-Surgical 03/15/19 1151 Endotracheal Tube 1 day          Peripheral Intravenous Line                 Peripheral IV - Single  Lumen 03/15/19 0000 Right Antecubital 2 days         Peripheral IV - Single Lumen 03/15/19 1135 Left Antecubital 1 day         Peripheral IV - Single Lumen 03/15/19 2000 Right Forearm 1 day                Physical Exam   Constitutional: He is oriented to person, place, and time. He appears well-developed and well-nourished. He is intubated.   HENT:   Head: Normocephalic and atraumatic.   Eyes: EOM are normal.   Cardiovascular: Normal rate. An irregularly irregular rhythm present. Exam reveals no gallop and no friction rub.   Pulmonary/Chest: Effort normal and breath sounds normal. No stridor. He is intubated. He has no wheezes. He has no rales.   Abdominal: Soft. Bowel sounds are normal. There is no rebound and no guarding.   Musculoskeletal: He exhibits no edema.   Neurological: He is alert and oriented to person, place, and time. No cranial nerve deficit.   Skin: Skin is warm and dry.   Psychiatric: He has a normal mood and affect. His behavior is normal.   Vitals reviewed.      Significant Labs: All pertinent lab results from the last 24 hours have been reviewed.    Significant Imaging: Reviewed.    Assessment and Plan:       * Alcohol withdrawal    - sever presentation with agitation, hallucinations, and autonomic instability  - patient currently intubated and mechanically ventilated with continuous IV sedation ordered  - continue versed gtt and propofol gtt  - Wean down versed gtt as tolerated      On mechanically assisted ventilation    - intubated during ACLS  - AC mode with sedation       Cardiac arrest    - obtain TTE with CFD  - cover with broad spectrum abx for now (leukocytosis post-ROSC)  - maintain K>4 and Mg>2  - obtain EEG   - treat BB poisoning     Lactic acidosis    - AGAP present prior to cardiac arrest  - lactic acid after arrest is 8.1  - ED has administered more isotonic fluid  - optimize cardiac profile  - obtain BCx x2, UA with reflex UCx  - administer broad spectrum antibiotics  - Blood cx  NGTD  - Vancomycin and cefepime deescalated to Ceftriaxone        Hyponatremia    - not explained by hyperglycemia and not on diuretics  - in the setting of JASS; ED administered isotonic fluid  - continue to monitor  - Improving with IV fluid administration       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    - prominent features include hypotension and bradycardia  - ECG with bradyarrhythmia; patient received fluid in the ED  - IV atropine, IV glucagon (bolus followed by gtt) and IV calcium  - TVP initially discussed with Dr. Alem Miller: recommended atropine 1 mg IV  - TVP placed post-ROSC with base rate set at 60 BPM  - TVP now pulled  - f/u TTE with CFD  - hold BB, CCB and flecainide  - Glucagon gtt discontinued. Wean off pressor today and re-start BB tomorrow as HR tolerates.        Diabetes mellitus without complication    - now off glucagon drip  - SSI     Paroxysmal atrial fibrillation    - hold BB, CCB and flecainide in the setting of bradycardia  - held NOAC for several days pending liver bx  - IV UFH in lieu of NOAC        Elevated LFTs    - consult hepatology with concern for alcoholic hepatitis  - scheduled for liver biopsy this month  - hold statin and fenofibrate  - optimize cardiac profile     Hypertriglyceridemia    - hold statin         VTE Risk Mitigation (From admission, onward)        Ordered     heparin 25,000 units in dextrose 5% 250 mL (100 units/mL) infusion LOW INTENSITY nomogram - [facility]  Continuous      03/15/19 1036     heparin 25,000 units in dextrose 5% (100 units/ml) IV bolus from bag - ADDITIONAL PRN BOLUS - 60 units/kg  As needed (PRN)      03/15/19 1036     heparin 25,000 units in dextrose 5% (100 units/ml) IV bolus from bag - ADDITIONAL PRN BOLUS - 30 units/kg  As needed (PRN)      03/15/19 1036     IP VTE HIGH RISK PATIENT  Once      03/15/19 0847          Quique Gonzalez MD  Cardiology  [facility]

## 2019-03-18 ENCOUNTER — TELEPHONE (OUTPATIENT)
Dept: HEPATOLOGY | Facility: CLINIC | Age: 64
End: 2019-03-18

## 2019-03-18 LAB
ALBUMIN SERPL BCP-MCNC: 2.7 G/DL
ALLENS TEST: ABNORMAL
ALLENS TEST: ABNORMAL
ALP SERPL-CCNC: 50 U/L
ALT SERPL W/O P-5'-P-CCNC: 250 U/L
ANION GAP SERPL CALC-SCNC: 10 MMOL/L
ANION GAP SERPL CALC-SCNC: 8 MMOL/L
APTT BLDCRRT: 33.9 SEC
APTT BLDCRRT: 36.6 SEC
APTT BLDCRRT: 39.7 SEC
APTT BLDCRRT: 51.6 SEC
AST SERPL-CCNC: 298 U/L
BASOPHILS # BLD AUTO: 0.04 K/UL
BASOPHILS NFR BLD: 0.8 %
BILIRUB SERPL-MCNC: 1.5 MG/DL
BUN SERPL-MCNC: 4 MG/DL
BUN SERPL-MCNC: 4 MG/DL
CALCIUM SERPL-MCNC: 8.1 MG/DL
CALCIUM SERPL-MCNC: 8.7 MG/DL
CHLORIDE SERPL-SCNC: 101 MMOL/L
CHLORIDE SERPL-SCNC: 105 MMOL/L
CO2 SERPL-SCNC: 23 MMOL/L
CO2 SERPL-SCNC: 24 MMOL/L
CREAT SERPL-MCNC: 0.8 MG/DL
CREAT SERPL-MCNC: 1 MG/DL
DELSYS: ABNORMAL
DIFFERENTIAL METHOD: ABNORMAL
EOSINOPHIL # BLD AUTO: 0.1 K/UL
EOSINOPHIL NFR BLD: 1.7 %
ERYTHROCYTE [DISTWIDTH] IN BLOOD BY AUTOMATED COUNT: 13.2 %
ERYTHROCYTE [SEDIMENTATION RATE] IN BLOOD BY WESTERGREN METHOD: 16 MM/H
EST. GFR  (AFRICAN AMERICAN): >60 ML/MIN/1.73 M^2
EST. GFR  (AFRICAN AMERICAN): >60 ML/MIN/1.73 M^2
EST. GFR  (NON AFRICAN AMERICAN): >60 ML/MIN/1.73 M^2
EST. GFR  (NON AFRICAN AMERICAN): >60 ML/MIN/1.73 M^2
FIO2: 40
FOLATE SERPL-MCNC: 11.9 NG/ML
GLUCOSE SERPL-MCNC: 115 MG/DL
GLUCOSE SERPL-MCNC: 149 MG/DL
HCO3 UR-SCNC: 23.1 MMOL/L (ref 24–28)
HCO3 UR-SCNC: 24.5 MMOL/L (ref 24–28)
HCT VFR BLD AUTO: 34.2 %
HGB BLD-MCNC: 11 G/DL
IMM GRANULOCYTES # BLD AUTO: 0.02 K/UL
IMM GRANULOCYTES NFR BLD AUTO: 0.4 %
INR PPP: 1.1
LACTATE SERPL-SCNC: 0.9 MMOL/L
LYMPHOCYTES # BLD AUTO: 1 K/UL
LYMPHOCYTES NFR BLD: 19.9 %
MAGNESIUM SERPL-MCNC: 1.5 MG/DL
MAGNESIUM SERPL-MCNC: 1.5 MG/DL
MCH RBC QN AUTO: 33 PG
MCHC RBC AUTO-ENTMCNC: 32.2 G/DL
MCV RBC AUTO: 103 FL
MIN VOL: 11.4
MODE: ABNORMAL
MONOCYTES # BLD AUTO: 0.5 K/UL
MONOCYTES NFR BLD: 8.8 %
NEUTROPHILS # BLD AUTO: 3.6 K/UL
NEUTROPHILS NFR BLD: 68.4 %
NRBC BLD-RTO: 0 /100 WBC
PCO2 BLDA: 31.9 MMHG (ref 35–45)
PCO2 BLDA: 34.7 MMHG (ref 35–45)
PEEP: 5
PH SMN: 7.46 [PH] (ref 7.35–7.45)
PH SMN: 7.47 [PH] (ref 7.35–7.45)
PHOSPHATE SERPL-MCNC: 1.7 MG/DL
PIP: 27
PLATELET # BLD AUTO: 84 K/UL
PMV BLD AUTO: 9.6 FL
PO2 BLDA: 153 MMHG (ref 80–100)
PO2 BLDA: 79 MMHG (ref 80–100)
POC BE: -1 MMOL/L
POC BE: 1 MMOL/L
POC SATURATED O2: 96 % (ref 95–100)
POC SATURATED O2: 99 % (ref 95–100)
POC TCO2: 24 MMOL/L (ref 23–27)
POC TCO2: 26 MMOL/L (ref 23–27)
POTASSIUM SERPL-SCNC: 3.3 MMOL/L
POTASSIUM SERPL-SCNC: 4 MMOL/L
POTASSIUM SERPL-SCNC: 4.6 MMOL/L
PROT SERPL-MCNC: 5.8 G/DL
PROTHROMBIN TIME: 10.8 SEC
RBC # BLD AUTO: 3.33 M/UL
SAMPLE: ABNORMAL
SAMPLE: ABNORMAL
SITE: ABNORMAL
SITE: ABNORMAL
SODIUM SERPL-SCNC: 134 MMOL/L
SODIUM SERPL-SCNC: 137 MMOL/L
SP02: 100
VIT B12 SERPL-MCNC: 1288 PG/ML
VT: 500
WBC # BLD AUTO: 5.23 K/UL

## 2019-03-18 PROCEDURE — 25000003 PHARM REV CODE 250: Performed by: STUDENT IN AN ORGANIZED HEALTH CARE EDUCATION/TRAINING PROGRAM

## 2019-03-18 PROCEDURE — 85610 PROTHROMBIN TIME: CPT

## 2019-03-18 PROCEDURE — 94761 N-INVAS EAR/PLS OXIMETRY MLT: CPT

## 2019-03-18 PROCEDURE — 63600175 PHARM REV CODE 636 W HCPCS: Performed by: STUDENT IN AN ORGANIZED HEALTH CARE EDUCATION/TRAINING PROGRAM

## 2019-03-18 PROCEDURE — 80053 COMPREHEN METABOLIC PANEL: CPT

## 2019-03-18 PROCEDURE — 85025 COMPLETE CBC W/AUTO DIFF WBC: CPT

## 2019-03-18 PROCEDURE — 99900026 HC AIRWAY MAINTENANCE (STAT)

## 2019-03-18 PROCEDURE — 80048 BASIC METABOLIC PNL TOTAL CA: CPT

## 2019-03-18 PROCEDURE — 94003 VENT MGMT INPAT SUBQ DAY: CPT

## 2019-03-18 PROCEDURE — 83735 ASSAY OF MAGNESIUM: CPT | Mod: 91

## 2019-03-18 PROCEDURE — 25000003 PHARM REV CODE 250: Performed by: INTERNAL MEDICINE

## 2019-03-18 PROCEDURE — 84100 ASSAY OF PHOSPHORUS: CPT

## 2019-03-18 PROCEDURE — 25000003 PHARM REV CODE 250

## 2019-03-18 PROCEDURE — 36600 WITHDRAWAL OF ARTERIAL BLOOD: CPT

## 2019-03-18 PROCEDURE — 85730 THROMBOPLASTIN TIME PARTIAL: CPT

## 2019-03-18 PROCEDURE — C9113 INJ PANTOPRAZOLE SODIUM, VIA: HCPCS | Performed by: INTERNAL MEDICINE

## 2019-03-18 PROCEDURE — 99900035 HC TECH TIME PER 15 MIN (STAT)

## 2019-03-18 PROCEDURE — 84132 ASSAY OF SERUM POTASSIUM: CPT

## 2019-03-18 PROCEDURE — 82803 BLOOD GASES ANY COMBINATION: CPT

## 2019-03-18 PROCEDURE — 82607 VITAMIN B-12: CPT

## 2019-03-18 PROCEDURE — 27200966 HC CLOSED SUCTION SYSTEM

## 2019-03-18 PROCEDURE — 84207 ASSAY OF VITAMIN B-6: CPT

## 2019-03-18 PROCEDURE — 82746 ASSAY OF FOLIC ACID SERUM: CPT

## 2019-03-18 PROCEDURE — A4216 STERILE WATER/SALINE, 10 ML: HCPCS | Performed by: INTERNAL MEDICINE

## 2019-03-18 PROCEDURE — 84590 ASSAY OF VITAMIN A: CPT

## 2019-03-18 PROCEDURE — 85730 THROMBOPLASTIN TIME PARTIAL: CPT | Mod: 91

## 2019-03-18 PROCEDURE — 20000000 HC ICU ROOM

## 2019-03-18 PROCEDURE — 27000221 HC OXYGEN, UP TO 24 HOURS

## 2019-03-18 PROCEDURE — 83735 ASSAY OF MAGNESIUM: CPT

## 2019-03-18 PROCEDURE — 63600175 PHARM REV CODE 636 W HCPCS: Performed by: INTERNAL MEDICINE

## 2019-03-18 RX ORDER — DIGOXIN 0.25 MG/ML
250 INJECTION INTRAMUSCULAR; INTRAVENOUS EVERY 6 HOURS
Status: DISCONTINUED | OUTPATIENT
Start: 2019-03-18 | End: 2019-03-19

## 2019-03-18 RX ORDER — DIGOXIN 0.25 MG/ML
500 INJECTION INTRAMUSCULAR; INTRAVENOUS ONCE
Status: DISCONTINUED | OUTPATIENT
Start: 2019-03-18 | End: 2019-03-19

## 2019-03-18 RX ORDER — ALLOPURINOL 100 MG/1
200 TABLET ORAL DAILY
Status: DISCONTINUED | OUTPATIENT
Start: 2019-03-19 | End: 2019-03-22

## 2019-03-18 RX ORDER — METOPROLOL TARTRATE 1 MG/ML
5 INJECTION, SOLUTION INTRAVENOUS ONCE
Status: COMPLETED | OUTPATIENT
Start: 2019-03-18 | End: 2019-03-18

## 2019-03-18 RX ORDER — NAPROXEN SODIUM 220 MG/1
81 TABLET, FILM COATED ORAL DAILY
Status: DISCONTINUED | OUTPATIENT
Start: 2019-03-19 | End: 2019-03-22

## 2019-03-18 RX ADMIN — PROPOFOL 50 MCG/KG/MIN: 10 INJECTION, EMULSION INTRAVENOUS at 05:03

## 2019-03-18 RX ADMIN — MIDAZOLAM HYDROCHLORIDE 2 MG/HR: 5 INJECTION, SOLUTION INTRAMUSCULAR; INTRAVENOUS at 06:03

## 2019-03-18 RX ADMIN — ASPIRIN 81 MG CHEWABLE TABLET 81 MG: 81 TABLET CHEWABLE at 08:03

## 2019-03-18 RX ADMIN — Medication 3 ML: at 09:03

## 2019-03-18 RX ADMIN — HEPARIN SODIUM AND DEXTROSE 19 UNITS/KG/HR: 10000; 5 INJECTION INTRAVENOUS at 01:03

## 2019-03-18 RX ADMIN — Medication 100 MG: at 08:03

## 2019-03-18 RX ADMIN — PROPOFOL 20 MCG/KG/MIN: 10 INJECTION, EMULSION INTRAVENOUS at 06:03

## 2019-03-18 RX ADMIN — POTASSIUM CHLORIDE 20 MEQ: 200 INJECTION, SOLUTION INTRAVENOUS at 08:03

## 2019-03-18 RX ADMIN — METOPROLOL TARTRATE 5 MG: 5 INJECTION INTRAVENOUS at 02:03

## 2019-03-18 RX ADMIN — PROPOFOL 20 MCG/KG/MIN: 10 INJECTION, EMULSION INTRAVENOUS at 01:03

## 2019-03-18 RX ADMIN — POTASSIUM CHLORIDE 60 MEQ: 200 INJECTION, SOLUTION INTRAVENOUS at 03:03

## 2019-03-18 RX ADMIN — MAGNESIUM SULFATE 2 G: 2 INJECTION INTRAVENOUS at 06:03

## 2019-03-18 RX ADMIN — MAGNESIUM SULFATE 2 G: 2 INJECTION INTRAVENOUS at 03:03

## 2019-03-18 RX ADMIN — PROPOFOL 50 MCG/KG/MIN: 10 INJECTION, EMULSION INTRAVENOUS at 09:03

## 2019-03-18 RX ADMIN — HEPARIN SODIUM AND DEXTROSE 21 UNITS/KG/HR: 10000; 5 INJECTION INTRAVENOUS at 05:03

## 2019-03-18 RX ADMIN — CHLORHEXIDINE GLUCONATE 0.12% ORAL RINSE 15 ML: 1.2 LIQUID ORAL at 09:03

## 2019-03-18 RX ADMIN — PANTOPRAZOLE SODIUM 40 MG: 40 INJECTION, POWDER, FOR SOLUTION INTRAVENOUS at 08:03

## 2019-03-18 RX ADMIN — CHLORHEXIDINE GLUCONATE 0.12% ORAL RINSE 15 ML: 1.2 LIQUID ORAL at 08:03

## 2019-03-18 RX ADMIN — SODIUM CHLORIDE: 0.9 INJECTION, SOLUTION INTRAVENOUS at 09:03

## 2019-03-18 RX ADMIN — SODIUM CHLORIDE: 0.9 INJECTION, SOLUTION INTRAVENOUS at 01:03

## 2019-03-18 RX ADMIN — POTASSIUM CHLORIDE 20 MEQ: 200 INJECTION, SOLUTION INTRAVENOUS at 05:03

## 2019-03-18 RX ADMIN — ALLOPURINOL 200 MG: 100 TABLET ORAL at 08:03

## 2019-03-18 RX ADMIN — CEFTRIAXONE SODIUM 1 G: 1 INJECTION, POWDER, FOR SOLUTION INTRAMUSCULAR; INTRAVENOUS at 12:03

## 2019-03-18 NOTE — PHYSICIAN QUERY
PT Name: Donald Warren Abadie  MR #: [id number]     Physician Query Form - Etiology of Condition Clarification      CDS/: Christen Ahmadi               Contact information:[email or web address]  This form is a permanent document in the medical record.     Query Date: March 18, 2019    By submitting this query, we are merely seeking further clarification of documentation.  Please utilize your independent clinical judgment when addressing the question(s) below.     The medical record contains the following:    Findings Supporting Clinical Information Location in Medical Record   Cardiac arrest Home meds  include Lopressor 25 mg PO BID and diltiazem 120 mg qD with diltiazem started 3/4. The patient is on flecainide 150 mg PO BID. He took several doses of breakthrough metoprolol tartrate 25 mg (he thinks three pills in the last 6 hours) on top of his long  acting diltiazem and flecainide.     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        - prominent features include hypotension and bradycardia   - ECG with bradyarrhythmia; patient received fluid in the ED   - IV atropine, IV glucagon (bolus followed by gtt) and IV calcium   - TVP initially discussed with Dr. Alem Miller: recommended atropine 1 mg IV   - TVP placed post-ROSC with base rate set at 60 BPM   - f/u TTE with CFD   - hold BB, CCB and flecainide     H&P     Please document your best medical opinion regarding the etiology of Cardiac arrest for which treatment is/was directed.     Provider use only      [   x  ]  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         [     ]  Other:please specify _____________________________             [  ] Clinically Undetermined     Please document in your progress notes daily for the duration of treatment, until resolved, and include in your discharge summary.

## 2019-03-18 NOTE — ASSESSMENT & PLAN NOTE
- not explained by hyperglycemia and not on diuretics  - in the setting of JASS; ED administered isotonic fluid  - continue to monitor  - Improving with IV fluid administration    Na 134-135  RESOLVED

## 2019-03-18 NOTE — PHYSICIAN QUERY
PT Name: Donald Warren Abadie  MR #: [id number]     Physician Query Form - Diagnosis Clarification      CDS/: Christen Ahmadi               Contact information:[email or web address]    This form is a permanent document in the medical record.     Query Date: March 18, 2019    By submitting this query, we are merely seeking further clarification of documentation.  Please utilize your independent clinical judgment when addressing the question(s) below.     The medical record contains the following:      Findings Supporting Clinical Information Location in Medical Record   Septic shock Septic shock: Unclear source. On broad spectrum coverage  Cardiac arrest    - obtain TTE with CFD   - cover with broad spectrum abx for now (leukocytosis post-ROSC)    Vital Signs (24h Range):   Temp:  [98 °F]    Pulse:  [33-54]    Resp:  [16]   SpO2:  [95 %-98 %]  BP: (75-80)/(40-47)    WBC=4.78 to 17.23    No growth   H&P                            Lab 3-15 to 3-18    Lab blood culture 3-15     Please clarify if the Septic shock diagnosis has been:    [  ] Ruled In   [ x ] Ruled Out   [  ] Other/Clarification of findings (please specify):     [  ] Clinically undetermined     Please document in your progress notes daily for the duration of treatment, until resolved, and include in your discharge summary.

## 2019-03-18 NOTE — ASSESSMENT & PLAN NOTE
- AGAP present prior to cardiac arrest  - lactic acid after arrest is 8.1  - ED has administered more isotonic fluid  - optimize cardiac profile  - obtain BCx x2, UA with reflex UCx  - administer broad spectrum antibiotics  - Blood cx NGTD  - Vancomycin and cefepime deescalated to Ceftriaxone     LA 0.7  IMPROVED

## 2019-03-18 NOTE — SUBJECTIVE & OBJECTIVE
Interval History:   Patient worsened overnight with decrease of propofol. Patient became tachycardic and sweaty and agitated, breathing over the vent. Will increase propofol to previous setting and give patient another day or two on vent. Patient with severe alcohol w/d that will likely take at least 5 days from time of alcohol clearance [3/16 with alc 078 level; 3/17= day 1).       Review of Systems   Unable to perform ROS: intubated     Objective:     Vital Signs (Most Recent):  Temp: 98.1 °F (36.7 °C) (03/18/19 1100)  Pulse: 86 (03/18/19 1255)  Resp: 16 (03/18/19 1255)  BP: (!) 93/59 (03/18/19 1200)  SpO2: 97 % (03/18/19 1255) Vital Signs (24h Range):  Temp:  [97.4 °F (36.3 °C)-100 °F (37.8 °C)] 98.1 °F (36.7 °C)  Pulse:  [] 86  Resp:  [16-28] 16  SpO2:  [96 %-100 %] 97 %  BP: ()/(51-87) 93/59     Weight: 88 kg (194 lb 0.1 oz)  Body mass index is 31.33 kg/m².     SpO2: 97 %  O2 Device (Oxygen Therapy): ventilator      Intake/Output Summary (Last 24 hours) at 3/18/2019 1311  Last data filed at 3/18/2019 1200  Gross per 24 hour   Intake 4399.21 ml   Output 2635 ml   Net 1764.21 ml       Lines/Drains/Airways     Central Venous Catheter Line                 Percutaneous Central Line Insertion/Assessment - triple lumen  03/17/19 0300 right internal jugular 1 day          Drain                 NG/OG Tube 03/15/19 1150 [place] sump 18 Fr. Center mouth 3 days         Urethral Catheter 03/15/19 1206 Double-lumen 16 Fr. 3 days          Airway                 Airway - Non-Surgical 03/15/19 1151 Endotracheal Tube 3 days          Peripheral Intravenous Line                 Peripheral IV - Single Lumen 03/15/19 0000 Right Antecubital 3 days         Peripheral IV - Single Lumen 03/15/19 1135 Left Antecubital 3 days         Peripheral IV - Single Lumen 03/15/19 2000 Right Forearm 2 days                Physical Exam   Constitutional: He is oriented to person, place, and time. He appears well-developed and  well-nourished. He is intubated.   HENT:   Head: Normocephalic and atraumatic.   Eyes: EOM are normal.   Cardiovascular: Normal rate. An irregularly irregular rhythm present. Exam reveals no gallop and no friction rub.   Pulmonary/Chest: Effort normal and breath sounds normal. No stridor. He is intubated. He has no wheezes. He has no rales.   Abdominal: Soft. Bowel sounds are normal. There is no rebound and no guarding.   Musculoskeletal: He exhibits no edema.   Neurological: He is alert and oriented to person, place, and time. No cranial nerve deficit.   Skin: Skin is warm and dry.   Psychiatric: He has a normal mood and affect. His behavior is normal.   Vitals reviewed.      Significant Labs:     Recent Results (from the past 24 hour(s))   Lactic acid, plasma    Collection Time: 03/17/19  2:24 PM   Result Value Ref Range    Lactate (Lactic Acid) 0.7 0.5 - 2.2 mmol/L   Calcium, ionized    Collection Time: 03/17/19  2:24 PM   Result Value Ref Range    Calcium, Ion 0.84 (L) 1.06 - 1.42 mmol/L   APTT    Collection Time: 03/17/19  6:12 PM   Result Value Ref Range    aPTT 41.4 (H) 21.0 - 32.0 sec   Lactic acid, plasma    Collection Time: 03/17/19 11:52 PM   Result Value Ref Range    Lactate (Lactic Acid) 0.9 0.5 - 2.2 mmol/L   APTT    Collection Time: 03/17/19 11:52 PM   Result Value Ref Range    aPTT 36.6 (H) 21.0 - 32.0 sec   ISTAT PROCEDURE    Collection Time: 03/18/19  3:43 AM   Result Value Ref Range    POC PH 7.457 (H) 7.35 - 7.45    POC PCO2 34.7 (L) 35 - 45 mmHg    POC PO2 153 (H) 80 - 100 mmHg    POC HCO3 24.5 24 - 28 mmol/L    POC BE 1 -2 to 2 mmol/L    POC SATURATED O2 99 95 - 100 %    POC TCO2 26 23 - 27 mmol/L    Rate 16     Sample ARTERIAL     Site RR     Allens Test Pass     DelSys Adult Vent     Mode AC/PRVC     Vt 500     PEEP 5     PiP 27     FiO2 40     Min Vol 11.4     Sp02 100    Comprehensive metabolic panel    Collection Time: 03/18/19  4:09 AM   Result Value Ref Range    Sodium 134 (L) 136 - 145  mmol/L    Potassium 4.0 3.5 - 5.1 mmol/L    Chloride 101 95 - 110 mmol/L    CO2 23 23 - 29 mmol/L    Glucose 149 (H) 70 - 110 mg/dL    BUN, Bld 4 (L) 8 - 23 mg/dL    Creatinine 1.0 0.5 - 1.4 mg/dL    Calcium 8.7 8.7 - 10.5 mg/dL    Total Protein 5.8 (L) 6.0 - 8.4 g/dL    Albumin 2.7 (L) 3.5 - 5.2 g/dL    Total Bilirubin 1.5 (H) 0.1 - 1.0 mg/dL    Alkaline Phosphatase 50 (L) 55 - 135 U/L     (H) 10 - 40 U/L     (H) 10 - 44 U/L    Anion Gap 10 8 - 16 mmol/L    eGFR if African American >60.0 >60 mL/min/1.73 m^2    eGFR if non African American >60.0 >60 mL/min/1.73 m^2   Magnesium    Collection Time: 03/18/19  4:09 AM   Result Value Ref Range    Magnesium 1.5 (L) 1.6 - 2.6 mg/dL   APTT    Collection Time: 03/18/19  4:09 AM   Result Value Ref Range    aPTT 33.9 (H) 21.0 - 32.0 sec   Protime-INR    Collection Time: 03/18/19  4:09 AM   Result Value Ref Range    Prothrombin Time 10.8 9.0 - 12.5 sec    INR 1.1 0.8 - 1.2   CBC auto differential    Collection Time: 03/18/19  4:09 AM   Result Value Ref Range    WBC 5.23 3.90 - 12.70 K/uL    RBC 3.33 (L) 4.60 - 6.20 M/uL    Hemoglobin 11.0 (L) 14.0 - 18.0 g/dL    Hematocrit 34.2 (L) 40.0 - 54.0 %     (H) 82 - 98 fL    MCH 33.0 (H) 27.0 - 31.0 pg    MCHC 32.2 32.0 - 36.0 g/dL    RDW 13.2 11.5 - 14.5 %    Platelets 84 (L) 150 - 350 K/uL    MPV 9.6 9.2 - 12.9 fL    Immature Granulocytes 0.4 0.0 - 0.5 %    Gran # (ANC) 3.6 1.8 - 7.7 K/uL    Immature Grans (Abs) 0.02 0.00 - 0.04 K/uL    Lymph # 1.0 1.0 - 4.8 K/uL    Mono # 0.5 0.3 - 1.0 K/uL    Eos # 0.1 0.0 - 0.5 K/uL    Baso # 0.04 0.00 - 0.20 K/uL    nRBC 0 0 /100 WBC    Gran% 68.4 38.0 - 73.0 %    Lymph% 19.9 18.0 - 48.0 %    Mono% 8.8 4.0 - 15.0 %    Eosinophil% 1.7 0.0 - 8.0 %    Basophil% 0.8 0.0 - 1.9 %    Differential Method Automated    Phosphorus    Collection Time: 03/18/19  4:09 AM   Result Value Ref Range    Phosphorus 1.7 (L) 2.7 - 4.5 mg/dL   Vitamin B12    Collection Time: 03/18/19  8:16 AM    Result Value Ref Range    Vitamin B-12 1288 (H) 210 - 950 pg/mL   Folate    Collection Time: 03/18/19  8:16 AM   Result Value Ref Range    Folate 11.9 4.0 - 24.0 ng/mL   APTT    Collection Time: 03/18/19 12:20 PM   Result Value Ref Range    aPTT 51.6 (H) 21.0 - 32.0 sec

## 2019-03-18 NOTE — PLAN OF CARE
Problem: Adult Inpatient Plan of Care  Goal: Patient-Specific Goal (Individualization)  Outcome: Ongoing (interventions implemented as appropriate)  No acute events throughout shift, VS and assessment per flow sheet, patient progressing towards goals as tolerated. Patient remains intubated, in afib, BP WNL. Sedation being weaned, restless at times, moving all extremities spontaneously. Remains in afib, rates sustaining in the 130's-140's, 1 dose of metoprolol given with relief. Levo able to weaned off. Propofol, versad, and fluids continue to infuse. Lynn with good UO, CVP 8-9, no BM. Tmax 100. Plan of care reviewed with Donald Warren Abadie and family, all concerns addressed, will continue to monitor.

## 2019-03-18 NOTE — ASSESSMENT & PLAN NOTE
- sever presentation with agitation, hallucinations, and autonomic instability  - patient currently intubated and mechanically ventilated with continuous IV sedation ordered  - continue versed gtt and propofol gtt  - Weaned down versed gtt however, patient did not tolerate. Will increase back to 3 mg/hr and give patient 1-2 more days to recover  -Patient worsened overnight with decrease of propofol. Patient became tachycardic and sweaty and agitated, breathing over the vent. Will increase propofol to previous setting and give patient another day or two on vent. Patient with severe alcohol w/d that will likely take at least 5 days from time of alcohol clearance [3/16 with alc 078 level; 3/17= day 1 no alc).

## 2019-03-18 NOTE — TELEPHONE ENCOUNTER
Radiology called and notified on Friday 3/15/19 the patient was in ER before call was placed to the patient again about the Liver Biopsy appt for Tues 3/19/18.  Marlena Sibley NP notified the patient was in ER.  Radiology called today 3/18/19 to notify the patient remains in the Hospital.  Will release the appointment date Tuesday 3/19/19 for the Liver Biopsy.  Will reschedule at another date.

## 2019-03-19 LAB
ALBUMIN SERPL BCP-MCNC: 2.4 G/DL
ALLENS TEST: ABNORMAL
ALP SERPL-CCNC: 49 U/L
ALT SERPL W/O P-5'-P-CCNC: 152 U/L
ANION GAP SERPL CALC-SCNC: 8 MMOL/L
ANION GAP SERPL CALC-SCNC: 9 MMOL/L
APTT BLDCRRT: 37.6 SEC
APTT BLDCRRT: 42.8 SEC
APTT BLDCRRT: 46.9 SEC
AST SERPL-CCNC: 121 U/L
BASOPHILS # BLD AUTO: 0.05 K/UL
BASOPHILS NFR BLD: 1.1 %
BILIRUB SERPL-MCNC: 0.9 MG/DL
BUN SERPL-MCNC: 6 MG/DL
BUN SERPL-MCNC: 7 MG/DL
CALCIUM SERPL-MCNC: 8.6 MG/DL
CALCIUM SERPL-MCNC: 8.6 MG/DL
CHLORIDE SERPL-SCNC: 107 MMOL/L
CHLORIDE SERPL-SCNC: 108 MMOL/L
CO2 SERPL-SCNC: 20 MMOL/L
CO2 SERPL-SCNC: 21 MMOL/L
CREAT SERPL-MCNC: 0.8 MG/DL
CREAT SERPL-MCNC: 0.8 MG/DL
DELSYS: ABNORMAL
DIFFERENTIAL METHOD: ABNORMAL
EOSINOPHIL # BLD AUTO: 0.2 K/UL
EOSINOPHIL NFR BLD: 3.8 %
ERYTHROCYTE [DISTWIDTH] IN BLOOD BY AUTOMATED COUNT: 13.2 %
ERYTHROCYTE [SEDIMENTATION RATE] IN BLOOD BY WESTERGREN METHOD: 16 MM/H
EST. GFR  (AFRICAN AMERICAN): >60 ML/MIN/1.73 M^2
EST. GFR  (AFRICAN AMERICAN): >60 ML/MIN/1.73 M^2
EST. GFR  (NON AFRICAN AMERICAN): >60 ML/MIN/1.73 M^2
EST. GFR  (NON AFRICAN AMERICAN): >60 ML/MIN/1.73 M^2
FIO2: 21
GLUCOSE SERPL-MCNC: 125 MG/DL
GLUCOSE SERPL-MCNC: 144 MG/DL
HCO3 UR-SCNC: 22 MMOL/L (ref 24–28)
HCT VFR BLD AUTO: 33.1 %
HGB BLD-MCNC: 10.7 G/DL
IMM GRANULOCYTES # BLD AUTO: 0.05 K/UL
IMM GRANULOCYTES NFR BLD AUTO: 1.1 %
INR PPP: 1
LYMPHOCYTES # BLD AUTO: 1.3 K/UL
LYMPHOCYTES NFR BLD: 26.7 %
MAGNESIUM SERPL-MCNC: 1.8 MG/DL
MAGNESIUM SERPL-MCNC: 1.8 MG/DL
MAGNESIUM SERPL-MCNC: 2.2 MG/DL
MAGNESIUM SERPL-MCNC: 2.4 MG/DL
MCH RBC QN AUTO: 33.5 PG
MCHC RBC AUTO-ENTMCNC: 32.3 G/DL
MCV RBC AUTO: 104 FL
MODE: ABNORMAL
MONOCYTES # BLD AUTO: 0.6 K/UL
MONOCYTES NFR BLD: 11.8 %
NEUTROPHILS # BLD AUTO: 2.7 K/UL
NEUTROPHILS NFR BLD: 55.5 %
NRBC BLD-RTO: 0 /100 WBC
PCO2 BLDA: 30 MMHG (ref 35–45)
PEEP: 5
PH SMN: 7.47 [PH] (ref 7.35–7.45)
PLATELET # BLD AUTO: 91 K/UL
PMV BLD AUTO: 9.8 FL
PO2 BLDA: 94 MMHG (ref 80–100)
POC BE: -2 MMOL/L
POC SATURATED O2: 98 % (ref 95–100)
POC TCO2: 23 MMOL/L (ref 23–27)
POTASSIUM SERPL-SCNC: 3.6 MMOL/L
POTASSIUM SERPL-SCNC: 3.9 MMOL/L
POTASSIUM SERPL-SCNC: 4.3 MMOL/L
POTASSIUM SERPL-SCNC: 5.2 MMOL/L
PROT SERPL-MCNC: 5.4 G/DL
PROTHROMBIN TIME: 10.6 SEC
RBC # BLD AUTO: 3.19 M/UL
SAMPLE: ABNORMAL
SITE: ABNORMAL
SODIUM SERPL-SCNC: 136 MMOL/L
SODIUM SERPL-SCNC: 137 MMOL/L
VT: 500
WBC # BLD AUTO: 4.76 K/UL

## 2019-03-19 PROCEDURE — 63600175 PHARM REV CODE 636 W HCPCS: Performed by: INTERNAL MEDICINE

## 2019-03-19 PROCEDURE — 85610 PROTHROMBIN TIME: CPT

## 2019-03-19 PROCEDURE — 99231 PR SUBSEQUENT HOSPITAL CARE,LEVL I: ICD-10-PCS | Mod: ,,, | Performed by: INTERNAL MEDICINE

## 2019-03-19 PROCEDURE — 25000003 PHARM REV CODE 250: Performed by: INTERNAL MEDICINE

## 2019-03-19 PROCEDURE — 36600 WITHDRAWAL OF ARTERIAL BLOOD: CPT

## 2019-03-19 PROCEDURE — 85730 THROMBOPLASTIN TIME PARTIAL: CPT

## 2019-03-19 PROCEDURE — 85025 COMPLETE CBC W/AUTO DIFF WBC: CPT

## 2019-03-19 PROCEDURE — 94003 VENT MGMT INPAT SUBQ DAY: CPT

## 2019-03-19 PROCEDURE — 25000003 PHARM REV CODE 250: Performed by: STUDENT IN AN ORGANIZED HEALTH CARE EDUCATION/TRAINING PROGRAM

## 2019-03-19 PROCEDURE — 84132 ASSAY OF SERUM POTASSIUM: CPT | Mod: 91

## 2019-03-19 PROCEDURE — 25000003 PHARM REV CODE 250

## 2019-03-19 PROCEDURE — 83735 ASSAY OF MAGNESIUM: CPT

## 2019-03-19 PROCEDURE — 80048 BASIC METABOLIC PNL TOTAL CA: CPT

## 2019-03-19 PROCEDURE — A4216 STERILE WATER/SALINE, 10 ML: HCPCS | Performed by: INTERNAL MEDICINE

## 2019-03-19 PROCEDURE — 99900026 HC AIRWAY MAINTENANCE (STAT)

## 2019-03-19 PROCEDURE — 20000000 HC ICU ROOM

## 2019-03-19 PROCEDURE — 27000221 HC OXYGEN, UP TO 24 HOURS

## 2019-03-19 PROCEDURE — 82803 BLOOD GASES ANY COMBINATION: CPT

## 2019-03-19 PROCEDURE — 99900035 HC TECH TIME PER 15 MIN (STAT)

## 2019-03-19 PROCEDURE — 85730 THROMBOPLASTIN TIME PARTIAL: CPT | Mod: 91

## 2019-03-19 PROCEDURE — 63600175 PHARM REV CODE 636 W HCPCS: Performed by: STUDENT IN AN ORGANIZED HEALTH CARE EDUCATION/TRAINING PROGRAM

## 2019-03-19 PROCEDURE — C9113 INJ PANTOPRAZOLE SODIUM, VIA: HCPCS | Performed by: INTERNAL MEDICINE

## 2019-03-19 PROCEDURE — 27200966 HC CLOSED SUCTION SYSTEM

## 2019-03-19 PROCEDURE — 94761 N-INVAS EAR/PLS OXIMETRY MLT: CPT

## 2019-03-19 PROCEDURE — 99231 SBSQ HOSP IP/OBS SF/LOW 25: CPT | Mod: ,,, | Performed by: INTERNAL MEDICINE

## 2019-03-19 PROCEDURE — 80053 COMPREHEN METABOLIC PANEL: CPT

## 2019-03-19 PROCEDURE — 83735 ASSAY OF MAGNESIUM: CPT | Mod: 91

## 2019-03-19 RX ORDER — DIGOXIN 0.25 MG/ML
500 INJECTION INTRAMUSCULAR; INTRAVENOUS ONCE
Status: DISCONTINUED | OUTPATIENT
Start: 2019-03-19 | End: 2019-03-19

## 2019-03-19 RX ORDER — NOREPINEPHRINE BITARTRATE 1 MG/ML
INJECTION, SOLUTION INTRAVENOUS
Status: COMPLETED
Start: 2019-03-19 | End: 2019-03-19

## 2019-03-19 RX ADMIN — Medication 0.02 MCG/KG/MIN: at 05:03

## 2019-03-19 RX ADMIN — Medication 100 MG: at 08:03

## 2019-03-19 RX ADMIN — ASPIRIN 81 MG CHEWABLE TABLET 81 MG: 81 TABLET CHEWABLE at 08:03

## 2019-03-19 RX ADMIN — SODIUM CHLORIDE: 0.9 INJECTION, SOLUTION INTRAVENOUS at 12:03

## 2019-03-19 RX ADMIN — CHLORHEXIDINE GLUCONATE 0.12% ORAL RINSE 15 ML: 1.2 LIQUID ORAL at 09:03

## 2019-03-19 RX ADMIN — Medication 3 ML: at 03:03

## 2019-03-19 RX ADMIN — MAGNESIUM SULFATE 2 G: 2 INJECTION INTRAVENOUS at 05:03

## 2019-03-19 RX ADMIN — Medication 3 ML: at 09:03

## 2019-03-19 RX ADMIN — CHLORHEXIDINE GLUCONATE 0.12% ORAL RINSE 15 ML: 1.2 LIQUID ORAL at 08:03

## 2019-03-19 RX ADMIN — NOREPINEPHRINE BITARTRATE 4000 MCG: 1 INJECTION, SOLUTION, CONCENTRATE INTRAVENOUS at 11:03

## 2019-03-19 RX ADMIN — PROPOFOL 39.96 MCG/KG/MIN: 10 INJECTION, EMULSION INTRAVENOUS at 12:03

## 2019-03-19 RX ADMIN — POTASSIUM CHLORIDE 40 MEQ: 400 INJECTION, SOLUTION INTRAVENOUS at 06:03

## 2019-03-19 RX ADMIN — PANTOPRAZOLE SODIUM 40 MG: 40 INJECTION, POWDER, FOR SOLUTION INTRAVENOUS at 08:03

## 2019-03-19 RX ADMIN — HEPARIN SODIUM AND DEXTROSE 23 UNITS/KG/HR: 10000; 5 INJECTION INTRAVENOUS at 06:03

## 2019-03-19 RX ADMIN — POTASSIUM CHLORIDE 40 MEQ: 400 INJECTION, SOLUTION INTRAVENOUS at 05:03

## 2019-03-19 RX ADMIN — MAGNESIUM SULFATE 2 G: 2 INJECTION INTRAVENOUS at 04:03

## 2019-03-19 RX ADMIN — PROPOFOL 40 MCG/KG/MIN: 10 INJECTION, EMULSION INTRAVENOUS at 05:03

## 2019-03-19 RX ADMIN — Medication 3 ML: at 05:03

## 2019-03-19 RX ADMIN — PROPOFOL 30 MCG/KG/MIN: 10 INJECTION, EMULSION INTRAVENOUS at 03:03

## 2019-03-19 RX ADMIN — HEPARIN SODIUM AND DEXTROSE 23 UNITS/KG/HR: 10000; 5 INJECTION INTRAVENOUS at 04:03

## 2019-03-19 RX ADMIN — SODIUM CHLORIDE: 0.9 INJECTION, SOLUTION INTRAVENOUS at 06:03

## 2019-03-19 RX ADMIN — Medication 3 ML: at 12:03

## 2019-03-19 RX ADMIN — ALLOPURINOL 200 MG: 100 TABLET ORAL at 08:03

## 2019-03-19 NOTE — NURSING
Pt BP 82/49 with MAP of 57. Recycled and got 83/50 MAP 57. Spoke with Dr. Montelongo and came down to 40.16mcg/kg/min on Propofol and increased NS from 100 to 250 ml/hr. Recycled BP shows increased map of 62 at 78/52. Will continue to monitor.

## 2019-03-19 NOTE — PLAN OF CARE
Problem: Adult Inpatient Plan of Care  Goal: Plan of Care Review  Outcome: Ongoing (interventions implemented as appropriate)  No acute changes throughout shift. Patient remained intubated and sedated throughout shift. Patient started on tube feedings rate of 10.

## 2019-03-19 NOTE — PROGRESS NOTES
[facility]  Cardiology  Progress Note    Patient Name: Donald Warren Abadie  MRN: [id number]  Admission Date: 3/15/2019  Hospital Length of Stay: 4 days  Code Status: Full Code   Attending Physician: Luis Miguel Lagos MD   Primary Care Physician: Bacilio Larry MD  Expected Discharge Date:   Principal Problem:Alcohol withdrawal    Subjective:     Hospital Course:   Patient coded in ED, subsequently ROSC achieved and intubated. TVP placed in sterile fashion via the right IJ vein post-ROSC. Placed on versed gtt and propofol gtt. Vancomycin and cefepime started as concern for septic shock. TVP pulled on 3/16.         Interval History:   Patient worsened overnight with decrease of propofol. Patient became tachycardic and sweaty and agitated, breathing over the vent. Will increase propofol to previous setting and give patient another day or two on vent. Patient with severe alcohol w/d that will likely take at least 5 days from time of alcohol clearance [3/16 with alc 078 level; 3/17= day 1).       Review of Systems   Unable to perform ROS: intubated     Objective:     Vital Signs (Most Recent):  Temp: 98.1 °F (36.7 °C) (03/18/19 1100)  Pulse: 86 (03/18/19 1255)  Resp: 16 (03/18/19 1255)  BP: (!) 93/59 (03/18/19 1200)  SpO2: 97 % (03/18/19 1255) Vital Signs (24h Range):  Temp:  [97.4 °F (36.3 °C)-100 °F (37.8 °C)] 98.1 °F (36.7 °C)  Pulse:  [] 86  Resp:  [16-28] 16  SpO2:  [96 %-100 %] 97 %  BP: ()/(51-87) 93/59     Weight: 88 kg (194 lb 0.1 oz)  Body mass index is 31.33 kg/m².     SpO2: 97 %  O2 Device (Oxygen Therapy): ventilator      Intake/Output Summary (Last 24 hours) at 3/18/2019 1311  Last data filed at 3/18/2019 1200  Gross per 24 hour   Intake 4399.21 ml   Output 2635 ml   Net 1764.21 ml       Lines/Drains/Airways     Central Venous Catheter Line                 Percutaneous Central Line Insertion/Assessment - triple lumen  03/17/19 0300 right internal jugular 1 day          Drain                  NG/OG Tube 03/15/19 1150 [place] sump 18 Fr. Center mouth 3 days         Urethral Catheter 03/15/19 1206 Double-lumen 16 Fr. 3 days          Airway                 Airway - Non-Surgical 03/15/19 1151 Endotracheal Tube 3 days          Peripheral Intravenous Line                 Peripheral IV - Single Lumen 03/15/19 0000 Right Antecubital 3 days         Peripheral IV - Single Lumen 03/15/19 1135 Left Antecubital 3 days         Peripheral IV - Single Lumen 03/15/19 2000 Right Forearm 2 days                Physical Exam   Constitutional: He is oriented to person, place, and time. He appears well-developed and well-nourished. He is intubated.   HENT:   Head: Normocephalic and atraumatic.   Eyes: EOM are normal.   Cardiovascular: Normal rate. An irregularly irregular rhythm present. Exam reveals no gallop and no friction rub.   Pulmonary/Chest: Effort normal and breath sounds normal. No stridor. He is intubated. He has no wheezes. He has no rales.   Abdominal: Soft. Bowel sounds are normal. There is no rebound and no guarding.   Musculoskeletal: He exhibits no edema.   Neurological: He is alert and oriented to person, place, and time. No cranial nerve deficit.   Skin: Skin is warm and dry.   Psychiatric: He has a normal mood and affect. His behavior is normal.   Vitals reviewed.      Significant Labs:     Recent Results (from the past 24 hour(s))   Lactic acid, plasma    Collection Time: 03/17/19  2:24 PM   Result Value Ref Range    Lactate (Lactic Acid) 0.7 0.5 - 2.2 mmol/L   Calcium, ionized    Collection Time: 03/17/19  2:24 PM   Result Value Ref Range    Calcium, Ion 0.84 (L) 1.06 - 1.42 mmol/L   APTT    Collection Time: 03/17/19  6:12 PM   Result Value Ref Range    aPTT 41.4 (H) 21.0 - 32.0 sec   Lactic acid, plasma    Collection Time: 03/17/19 11:52 PM   Result Value Ref Range    Lactate (Lactic Acid) 0.9 0.5 - 2.2 mmol/L   APTT    Collection Time: 03/17/19 11:52 PM   Result Value Ref Range    aPTT 36.6  (H) 21.0 - 32.0 sec   ISTAT PROCEDURE    Collection Time: 03/18/19  3:43 AM   Result Value Ref Range    POC PH 7.457 (H) 7.35 - 7.45    POC PCO2 34.7 (L) 35 - 45 mmHg    POC PO2 153 (H) 80 - 100 mmHg    POC HCO3 24.5 24 - 28 mmol/L    POC BE 1 -2 to 2 mmol/L    POC SATURATED O2 99 95 - 100 %    POC TCO2 26 23 - 27 mmol/L    Rate 16     Sample ARTERIAL     Site RR     Allens Test Pass     DelSys Adult Vent     Mode AC/PRVC     Vt 500     PEEP 5     PiP 27     FiO2 40     Min Vol 11.4     Sp02 100    Comprehensive metabolic panel    Collection Time: 03/18/19  4:09 AM   Result Value Ref Range    Sodium 134 (L) 136 - 145 mmol/L    Potassium 4.0 3.5 - 5.1 mmol/L    Chloride 101 95 - 110 mmol/L    CO2 23 23 - 29 mmol/L    Glucose 149 (H) 70 - 110 mg/dL    BUN, Bld 4 (L) 8 - 23 mg/dL    Creatinine 1.0 0.5 - 1.4 mg/dL    Calcium 8.7 8.7 - 10.5 mg/dL    Total Protein 5.8 (L) 6.0 - 8.4 g/dL    Albumin 2.7 (L) 3.5 - 5.2 g/dL    Total Bilirubin 1.5 (H) 0.1 - 1.0 mg/dL    Alkaline Phosphatase 50 (L) 55 - 135 U/L     (H) 10 - 40 U/L     (H) 10 - 44 U/L    Anion Gap 10 8 - 16 mmol/L    eGFR if African American >60.0 >60 mL/min/1.73 m^2    eGFR if non African American >60.0 >60 mL/min/1.73 m^2   Magnesium    Collection Time: 03/18/19  4:09 AM   Result Value Ref Range    Magnesium 1.5 (L) 1.6 - 2.6 mg/dL   APTT    Collection Time: 03/18/19  4:09 AM   Result Value Ref Range    aPTT 33.9 (H) 21.0 - 32.0 sec   Protime-INR    Collection Time: 03/18/19  4:09 AM   Result Value Ref Range    Prothrombin Time 10.8 9.0 - 12.5 sec    INR 1.1 0.8 - 1.2   CBC auto differential    Collection Time: 03/18/19  4:09 AM   Result Value Ref Range    WBC 5.23 3.90 - 12.70 K/uL    RBC 3.33 (L) 4.60 - 6.20 M/uL    Hemoglobin 11.0 (L) 14.0 - 18.0 g/dL    Hematocrit 34.2 (L) 40.0 - 54.0 %     (H) 82 - 98 fL    MCH 33.0 (H) 27.0 - 31.0 pg    MCHC 32.2 32.0 - 36.0 g/dL    RDW 13.2 11.5 - 14.5 %    Platelets 84 (L) 150 - 350 K/uL    MPV 9.6  9.2 - 12.9 fL    Immature Granulocytes 0.4 0.0 - 0.5 %    Gran # (ANC) 3.6 1.8 - 7.7 K/uL    Immature Grans (Abs) 0.02 0.00 - 0.04 K/uL    Lymph # 1.0 1.0 - 4.8 K/uL    Mono # 0.5 0.3 - 1.0 K/uL    Eos # 0.1 0.0 - 0.5 K/uL    Baso # 0.04 0.00 - 0.20 K/uL    nRBC 0 0 /100 WBC    Gran% 68.4 38.0 - 73.0 %    Lymph% 19.9 18.0 - 48.0 %    Mono% 8.8 4.0 - 15.0 %    Eosinophil% 1.7 0.0 - 8.0 %    Basophil% 0.8 0.0 - 1.9 %    Differential Method Automated    Phosphorus    Collection Time: 03/18/19  4:09 AM   Result Value Ref Range    Phosphorus 1.7 (L) 2.7 - 4.5 mg/dL   Vitamin B12    Collection Time: 03/18/19  8:16 AM   Result Value Ref Range    Vitamin B-12 1288 (H) 210 - 950 pg/mL   Folate    Collection Time: 03/18/19  8:16 AM   Result Value Ref Range    Folate 11.9 4.0 - 24.0 ng/mL   APTT    Collection Time: 03/18/19 12:20 PM   Result Value Ref Range    aPTT 51.6 (H) 21.0 - 32.0 sec          Assessment and Plan:         * Alcohol withdrawal    - sever presentation with agitation, hallucinations, and autonomic instability  - patient currently intubated and mechanically ventilated with continuous IV sedation ordered  - continue versed gtt and propofol gtt  - Weaned down versed gtt however, patient did not tolerate. Will increase back to 3 mg/hr and give patient 1-2 more days to recover  -Patient worsened overnight with decrease of propofol. Patient became tachycardic and sweaty and agitated, breathing over the vent. Will increase propofol to previous setting and give patient another day or two on vent. Patient with severe alcohol w/d that will likely take at least 5 days from time of alcohol clearance [3/16 with alc 078 level; 3/17= day 1 no alc).        Cardiac arrest    - obtain TTE with CFD  - cover with broad spectrum abx for now (leukocytosis post-ROSC)  - maintain K>4 and Mg>2  - obtain EEG   - treat BB poisoning     On mechanically assisted ventilation    - intubated during ACLS  - AC mode with sedation  -FiO2  reduced to 21%, patient stable       Lactic acidosis    - AGAP present prior to cardiac arrest  - lactic acid after arrest is 8.1  - ED has administered more isotonic fluid  - optimize cardiac profile  - obtain BCx x2, UA with reflex UCx  - administer broad spectrum antibiotics  - Blood cx NGTD  - Vancomycin and cefepime deescalated to Ceftriaxone     LA 0.7  IMPROVED        Hyponatremia    - not explained by hyperglycemia and not on diuretics  - in the setting of JASS; ED administered isotonic fluid  - continue to monitor  - Improving with IV fluid administration    Na 134-135  RESOLVED        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    - prominent features include hypotension and bradycardia  - ECG with bradyarrhythmia; patient received fluid in the ED  - IV atropine, IV glucagon (bolus followed by gtt) and IV calcium  - TVP initially discussed with Dr. Alem Miller: recommended atropine 1 mg IV  - TVP placed post-ROSC with base rate set at 60 BPM  - TVP now pulled  - f/u TTE with CFD  - hold BB, CCB and flecainide  - Glucagon gtt discontinued. Wean off pressor today and re-start BB tomorrow as HR tolerates.        Diabetes mellitus without complication    - now off glucagon drip  - SSI     Paroxysmal atrial fibrillation    - hold BB, CCB and flecainide in the setting of bradycardia  - held NOAC for several days pending liver bx  - IV UFH in lieu of NOAC        Elevated LFTs    - consult hepatology with concern for alcoholic hepatitis  - scheduled for liver biopsy this month  - hold statin and fenofibrate  - optimize cardiac profile     Hypertriglyceridemia    - hold statin         VTE Risk Mitigation (From admission, onward)        Ordered     heparin 25,000 units in dextrose 5% 250 mL (100 units/mL) infusion LOW INTENSITY nomogram - [facility]  Continuous      03/15/19 1036     heparin 25,000 units in dextrose 5% (100 units/ml) IV bolus from bag - ADDITIONAL PRN BOLUS - 60 units/kg  As needed (PRN)       03/15/19 1036     heparin 25,000 units in dextrose 5% (100 units/ml) IV bolus from bag - ADDITIONAL PRN BOLUS - 30 units/kg  As needed (PRN)      03/15/19 1036     IP VTE HIGH RISK PATIENT  Once      03/15/19 0847          Otto Montelongo MD  Cardiology  [facility]

## 2019-03-19 NOTE — CARE UPDATE
Although this patient's hypotension and tachycardia are associated with increases and decreases in IV sedation dosing in this mechanically-ventilated patient with alcohol withdrawal, a focused cardiac ultrasound (FCU) was completed at bedside this morning to r/o cardiac tamponade. There was no evidence of pericardial effusion on FCU.

## 2019-03-19 NOTE — NURSING
Patient's CVP has increased from 6 to 12.  NS infusing at 250cc/hr.  Called Cardiology team to notify.  Received order to stop continuous IVF.

## 2019-03-19 NOTE — PLAN OF CARE
Problem: Adult Inpatient Plan of Care  Goal: Plan of Care Review  Outcome: Ongoing (interventions implemented as appropriate)    Recommendations     1. While on Propofol, recommend Peptamen Intense VHP @ 45 mL/hr to provide 1737 calories, 99 grams of protein, 907 mL fluid.              - Propofol currently providing 657 calories.   2. When Propofol discontinued, recommend Glucerna 1.5 @ 50 mL/hr to provide 1800 calories, 99 grams of protein, 911 mL fluid.               - Hold for residuals >500 mL; additional fluid per MD.  3. If able to extubate & advance diet, recommend 2000 kcal ADA diet (texture per SLP).   4. RD to monitor & follow-up.

## 2019-03-19 NOTE — PROGRESS NOTES
Patient's Potassium 5.2.  Physician notified.  Ordered to redraw Potassium and let him know the results.  GIANFRANCO

## 2019-03-19 NOTE — PLAN OF CARE
Problem: Adult Inpatient Plan of Care  Goal: Plan of Care Review  Outcome: Ongoing (interventions implemented as appropriate)  No acute events throughout shift, VS and assessment per flow sheet, patient progressing towards goals as tolerated. Patient remains intubated and sedated, withdraws on all extremities. No signs of tremors, the only evidence of DT is some sweating to face. Central line placed.  Propfol, versad, fluids, and heparin infusing. Lynn with good UO, no BM. Tmax 99. HR - tachycardic.  Son-in-law remains at bedside. Plan of care reviewed with family, all concerns addressed, will continue to monitor.

## 2019-03-19 NOTE — PROGRESS NOTES
"  [facility]  Adult Nutrition  Consult Note    SUMMARY     Recommendations    1. While on Propofol, recommend Peptamen Intense VHP @ 45 mL/hr to provide 1737 calories, 99 grams of protein, 907 mL fluid.   - Propofol currently providing 657 calories.   2. When Propofol discontinued, recommend Glucerna 1.5 @ 50 mL/hr to provide 1800 calories, 99 grams of protein, 911 mL fluid.    - Hold for residuals >500 mL; additional fluid per MD.  3. If able to extubate & advance diet, recommend 2000 kcal ADA diet (texture per SLP).   4. RD to monitor & follow-up.    Goals: Meet % EEN, EPN  Nutrition Goal Status: new  Communication of RD Recs: reviewed with RN    Reason for Assessment    Reason For Assessment: new tube feeding  Diagnosis: other (see comments)(Alcohol withdrawl)  Relevant Medical History: Etoh abuse, DM  Interdisciplinary Rounds: did not attend    General Information Comments: Pt intubated, sedated, receiving trickle feeds. Family member at bedside reports pt w/ poor appetite "off and on" PTA. States when pt would "drink a lot of alcohol, his appetite would decrease." States UBW is around 185#, which chart review confirms. NFPE complete, pt w/ no physical signs of malnutrition.  Nutrition Discharge Planning: Unable to determine    Nutrition/Diet History    Patient Reported Diet/Restrictions/Preferences: other (see comments)(NICOLE)  Spiritual, Cultural Beliefs, Advent Practices, Values that Affect Care: no  Factors Affecting Nutritional Intake: NPO, on mechanical ventilation    Anthropometrics    Temp: 96.9 °F (36.1 °C)  Height: 5' 5.98" (167.6 cm)  Height (inches): 65.98 in  Weight Method: Bed Scale  Weight: 90.1 kg (198 lb 10.2 oz)  Weight (lb): 198.64 lb  Ideal Body Weight (IBW), Male: 141.88 lb  % Ideal Body Weight, Male (lb): 140.01 lb  BMI (Calculated): 32.1  BMI Grade: 30 - 34.9- obesity - grade I  Usual Body Weight (UBW), k kg  % Usual Body Weight: 107.49  % Weight Change From " Usual Weight: 7.26 %    Lab/Procedures/Meds    Pertinent Labs Reviewed: reviewed  Pertinent Labs Comments: K 5.2, Gluc 125, A1C 7.8  Pertinent Medications Reviewed: reviewed  Pertinent Medications Comments: Heparin, Versed, Propofol    Estimated/Assessed Needs    Weight Used For Calorie Calculations: 86.5 kg (190 lb 11.2 oz)(Dosing wt)     Energy Calorie Requirements (kcal): 1746 kcal/d  Energy Need Method: [place]     Protein Requirements:  g/d (1.5-2 g/kg IBW)  Weight Used For Protein Calculations: 64.5 kg (142 lb 3.2 oz)     Estimated Fluid Requirement Method: other (see comments)(Per MD or 1 mL/kcal)     CHO Requirement: 50% total kcals    Nutrition Prescription Ordered    Current Diet Order: NPO  Current Nutrition Support Formula Ordered: Glucerna 1.5  Current Nutrition Support Rate Ordered: 10 mL/hr    Evaluation of Received Nutrient/Fluid Intake    Enteral Calories (kcal): 360  Enteral Protein (gm): 20  Enteral (Free Water) Fluid (mL): 182    Other Calories (kcal): 657 (Propofol)    Total Calories (kcal): 1017    % Kcal Needs: 58%  % Protein Needs: 21%    Energy Calories Required: not meeting needs  Protein Required: not meeting needs  Fluid Required: other (see comments)(Per MD or 1 mL/kcal)    Comments: LBM: 3/14    Tolerance: tolerating    Nutrition Risk    Level of Risk/Frequency of Follow-up: (2x/week)     Assessment and Plan    Nutrition Problem  Inadequate energy intake    Related to (etiology):   Current TF regimen    Signs and Symptoms (as evidenced by):   TF meeting <75% EEN, EPN    Nutrition Diagnosis Status:   New     Monitor and Evaluation    Food and Nutrient Intake: energy intake, food and beverage intake, enteral nutrition intake  Food and Nutrient Adminstration: diet order, enteral and parenteral nutrition administration  Physical Activity and Function: nutrition-related ADLs and IADLs  Anthropometric Measurements: weight, weight change  Biochemical Data, Medical Tests and  Procedures: lipid profile, inflammatory profile, glucose/endocrine profile, gastrointestinal profile, electrolyte and renal panel  Nutrition-Focused Physical Findings: overall appearance     Nutrition Follow-Up    RD Follow-up?: Yes

## 2019-03-20 PROBLEM — I48.92 ATRIAL FLUTTER: Status: ACTIVE | Noted: 2019-03-20

## 2019-03-20 LAB
ALBUMIN SERPL BCP-MCNC: 2.5 G/DL
ALLENS TEST: ABNORMAL
ALP SERPL-CCNC: 51 U/L
ALT SERPL W/O P-5'-P-CCNC: 96 U/L
ANION GAP SERPL CALC-SCNC: 10 MMOL/L
ANION GAP SERPL CALC-SCNC: 8 MMOL/L
APTT BLDCRRT: 35.2 SEC
APTT BLDCRRT: 47.2 SEC
APTT BLDCRRT: 48.4 SEC
AST SERPL-CCNC: 63 U/L
BACTERIA BLD CULT: NORMAL
BACTERIA BLD CULT: NORMAL
BASOPHILS # BLD AUTO: 0.07 K/UL
BASOPHILS NFR BLD: 1.1 %
BILIRUB SERPL-MCNC: 0.9 MG/DL
BUN SERPL-MCNC: 7 MG/DL
BUN SERPL-MCNC: 8 MG/DL
CALCIUM SERPL-MCNC: 9.2 MG/DL
CALCIUM SERPL-MCNC: 9.3 MG/DL
CHLORIDE SERPL-SCNC: 103 MMOL/L
CHLORIDE SERPL-SCNC: 104 MMOL/L
CO2 SERPL-SCNC: 21 MMOL/L
CO2 SERPL-SCNC: 23 MMOL/L
CREAT SERPL-MCNC: 0.8 MG/DL
CREAT SERPL-MCNC: 1 MG/DL
DELSYS: ABNORMAL
DIFFERENTIAL METHOD: ABNORMAL
EOSINOPHIL # BLD AUTO: 0.1 K/UL
EOSINOPHIL NFR BLD: 2.1 %
ERYTHROCYTE [DISTWIDTH] IN BLOOD BY AUTOMATED COUNT: 13.4 %
ERYTHROCYTE [SEDIMENTATION RATE] IN BLOOD BY WESTERGREN METHOD: 16 MM/H
EST. GFR  (AFRICAN AMERICAN): >60 ML/MIN/1.73 M^2
EST. GFR  (AFRICAN AMERICAN): >60 ML/MIN/1.73 M^2
EST. GFR  (NON AFRICAN AMERICAN): >60 ML/MIN/1.73 M^2
EST. GFR  (NON AFRICAN AMERICAN): >60 ML/MIN/1.73 M^2
FIO2: 21
GLUCOSE SERPL-MCNC: 144 MG/DL
GLUCOSE SERPL-MCNC: 165 MG/DL
HCO3 UR-SCNC: 21.9 MMOL/L (ref 24–28)
HCT VFR BLD AUTO: 34.6 %
HGB BLD-MCNC: 11.3 G/DL
IMM GRANULOCYTES # BLD AUTO: 0.12 K/UL
IMM GRANULOCYTES NFR BLD AUTO: 1.8 %
INR PPP: 1
LYMPHOCYTES # BLD AUTO: 1.5 K/UL
LYMPHOCYTES NFR BLD: 22 %
MAGNESIUM SERPL-MCNC: 1.7 MG/DL
MAGNESIUM SERPL-MCNC: 1.9 MG/DL
MCH RBC QN AUTO: 33.7 PG
MCHC RBC AUTO-ENTMCNC: 32.7 G/DL
MCV RBC AUTO: 103 FL
MIN VOL: 8.21
MODE: ABNORMAL
MONOCYTES # BLD AUTO: 0.8 K/UL
MONOCYTES NFR BLD: 12.4 %
NEUTROPHILS # BLD AUTO: 4 K/UL
NEUTROPHILS NFR BLD: 60.6 %
NRBC BLD-RTO: 1 /100 WBC
PCO2 BLDA: 31.4 MMHG (ref 35–45)
PEEP: 5
PH SMN: 7.45 [PH] (ref 7.35–7.45)
PIP: 32
PLATELET # BLD AUTO: 105 K/UL
PMV BLD AUTO: 10.6 FL
PO2 BLDA: 83 MMHG (ref 80–100)
POC BE: -2 MMOL/L
POC SATURATED O2: 97 % (ref 95–100)
POC TCO2: 23 MMOL/L (ref 23–27)
POTASSIUM SERPL-SCNC: 3.8 MMOL/L
POTASSIUM SERPL-SCNC: 4 MMOL/L
PROT SERPL-MCNC: 5.9 G/DL
PROTHROMBIN TIME: 10.6 SEC
PYRIDOXAL SERPL-MCNC: 6 UG/L (ref 5–50)
RBC # BLD AUTO: 3.35 M/UL
SAMPLE: ABNORMAL
SITE: ABNORMAL
SODIUM SERPL-SCNC: 134 MMOL/L
SODIUM SERPL-SCNC: 135 MMOL/L
SP02: 97
VIT A SERPL-MCNC: 30 UG/DL (ref 38–106)
VT: 500
WBC # BLD AUTO: 6.63 K/UL

## 2019-03-20 PROCEDURE — 20000000 HC ICU ROOM

## 2019-03-20 PROCEDURE — 25000003 PHARM REV CODE 250: Performed by: STUDENT IN AN ORGANIZED HEALTH CARE EDUCATION/TRAINING PROGRAM

## 2019-03-20 PROCEDURE — 63600175 PHARM REV CODE 636 W HCPCS: Performed by: INTERNAL MEDICINE

## 2019-03-20 PROCEDURE — 93010 EKG 12-LEAD: ICD-10-PCS | Mod: ,,, | Performed by: INTERNAL MEDICINE

## 2019-03-20 PROCEDURE — 85730 THROMBOPLASTIN TIME PARTIAL: CPT | Mod: 91

## 2019-03-20 PROCEDURE — 25000003 PHARM REV CODE 250: Performed by: INTERNAL MEDICINE

## 2019-03-20 PROCEDURE — 94003 VENT MGMT INPAT SUBQ DAY: CPT

## 2019-03-20 PROCEDURE — 83735 ASSAY OF MAGNESIUM: CPT | Mod: 91

## 2019-03-20 PROCEDURE — 85610 PROTHROMBIN TIME: CPT

## 2019-03-20 PROCEDURE — 63600175 PHARM REV CODE 636 W HCPCS

## 2019-03-20 PROCEDURE — 93005 ELECTROCARDIOGRAM TRACING: CPT

## 2019-03-20 PROCEDURE — 93010 ELECTROCARDIOGRAM REPORT: CPT | Mod: ,,, | Performed by: INTERNAL MEDICINE

## 2019-03-20 PROCEDURE — 63600175 PHARM REV CODE 636 W HCPCS: Performed by: STUDENT IN AN ORGANIZED HEALTH CARE EDUCATION/TRAINING PROGRAM

## 2019-03-20 PROCEDURE — 82803 BLOOD GASES ANY COMBINATION: CPT

## 2019-03-20 PROCEDURE — 99233 SBSQ HOSP IP/OBS HIGH 50: CPT | Mod: ,,, | Performed by: INTERNAL MEDICINE

## 2019-03-20 PROCEDURE — 94761 N-INVAS EAR/PLS OXIMETRY MLT: CPT

## 2019-03-20 PROCEDURE — 99900035 HC TECH TIME PER 15 MIN (STAT)

## 2019-03-20 PROCEDURE — 27000221 HC OXYGEN, UP TO 24 HOURS

## 2019-03-20 PROCEDURE — 27200966 HC CLOSED SUCTION SYSTEM

## 2019-03-20 PROCEDURE — 36600 WITHDRAWAL OF ARTERIAL BLOOD: CPT

## 2019-03-20 PROCEDURE — 99233 PR SUBSEQUENT HOSPITAL CARE,LEVL III: ICD-10-PCS | Mod: ,,, | Performed by: INTERNAL MEDICINE

## 2019-03-20 PROCEDURE — 80048 BASIC METABOLIC PNL TOTAL CA: CPT

## 2019-03-20 PROCEDURE — A4216 STERILE WATER/SALINE, 10 ML: HCPCS | Performed by: INTERNAL MEDICINE

## 2019-03-20 PROCEDURE — 85025 COMPLETE CBC W/AUTO DIFF WBC: CPT

## 2019-03-20 PROCEDURE — 83735 ASSAY OF MAGNESIUM: CPT

## 2019-03-20 PROCEDURE — 80053 COMPREHEN METABOLIC PANEL: CPT

## 2019-03-20 PROCEDURE — C9113 INJ PANTOPRAZOLE SODIUM, VIA: HCPCS | Performed by: INTERNAL MEDICINE

## 2019-03-20 RX ORDER — DIGOXIN 0.25 MG/ML
250 INJECTION INTRAMUSCULAR; INTRAVENOUS ONCE
Status: COMPLETED | OUTPATIENT
Start: 2019-03-21 | End: 2019-03-21

## 2019-03-20 RX ORDER — POTASSIUM CHLORIDE 20 MEQ/15ML
20 SOLUTION ORAL ONCE
Status: COMPLETED | OUTPATIENT
Start: 2019-03-20 | End: 2019-03-20

## 2019-03-20 RX ORDER — DIGOXIN 0.25 MG/ML
500 INJECTION INTRAMUSCULAR; INTRAVENOUS ONCE
Status: DISCONTINUED | OUTPATIENT
Start: 2019-03-20 | End: 2019-03-20

## 2019-03-20 RX ORDER — DIGOXIN 0.25 MG/ML
INJECTION INTRAMUSCULAR; INTRAVENOUS
Status: COMPLETED
Start: 2019-03-20 | End: 2019-03-20

## 2019-03-20 RX ORDER — DIGOXIN 0.25 MG/ML
500 INJECTION INTRAMUSCULAR; INTRAVENOUS ONCE
Status: COMPLETED | OUTPATIENT
Start: 2019-03-20 | End: 2019-03-20

## 2019-03-20 RX ORDER — METOPROLOL TARTRATE 1 MG/ML
5 INJECTION, SOLUTION INTRAVENOUS ONCE
Status: DISCONTINUED | OUTPATIENT
Start: 2019-03-20 | End: 2019-03-22

## 2019-03-20 RX ORDER — MAGNESIUM SULFATE HEPTAHYDRATE 40 MG/ML
2 INJECTION, SOLUTION INTRAVENOUS ONCE
Status: COMPLETED | OUTPATIENT
Start: 2019-03-20 | End: 2019-03-20

## 2019-03-20 RX ORDER — DIGOXIN 0.25 MG/ML
250 INJECTION INTRAMUSCULAR; INTRAVENOUS ONCE
Status: COMPLETED | OUTPATIENT
Start: 2019-03-20 | End: 2019-03-20

## 2019-03-20 RX ADMIN — PANTOPRAZOLE SODIUM 40 MG: 40 INJECTION, POWDER, FOR SOLUTION INTRAVENOUS at 08:03

## 2019-03-20 RX ADMIN — PROPOFOL 30 MCG/KG/MIN: 10 INJECTION, EMULSION INTRAVENOUS at 09:03

## 2019-03-20 RX ADMIN — PROPOFOL 25 MCG/KG/MIN: 10 INJECTION, EMULSION INTRAVENOUS at 10:03

## 2019-03-20 RX ADMIN — CHLORHEXIDINE GLUCONATE 0.12% ORAL RINSE 15 ML: 1.2 LIQUID ORAL at 08:03

## 2019-03-20 RX ADMIN — Medication 3 ML: at 02:03

## 2019-03-20 RX ADMIN — PROPOFOL 20 MCG/KG/MIN: 10 INJECTION, EMULSION INTRAVENOUS at 03:03

## 2019-03-20 RX ADMIN — Medication 3 ML: at 09:03

## 2019-03-20 RX ADMIN — MAGNESIUM SULFATE IN WATER 2 G: 40 INJECTION, SOLUTION INTRAVENOUS at 06:03

## 2019-03-20 RX ADMIN — SODIUM CHLORIDE, SODIUM LACTATE, POTASSIUM CHLORIDE, AND CALCIUM CHLORIDE 500 ML: .6; .31; .03; .02 INJECTION, SOLUTION INTRAVENOUS at 10:03

## 2019-03-20 RX ADMIN — MIDAZOLAM HYDROCHLORIDE 3 MG/HR: 5 INJECTION, SOLUTION INTRAMUSCULAR; INTRAVENOUS at 02:03

## 2019-03-20 RX ADMIN — HEPARIN SODIUM AND DEXTROSE 25 UNITS/KG/HR: 10000; 5 INJECTION INTRAVENOUS at 03:03

## 2019-03-20 RX ADMIN — Medication 100 MG: at 08:03

## 2019-03-20 RX ADMIN — HEPARIN SODIUM AND DEXTROSE 23 UNITS/KG/HR: 10000; 5 INJECTION INTRAVENOUS at 04:03

## 2019-03-20 RX ADMIN — ALLOPURINOL 200 MG: 100 TABLET ORAL at 08:03

## 2019-03-20 RX ADMIN — POTASSIUM CHLORIDE 20 MEQ: 20 SOLUTION ORAL at 06:03

## 2019-03-20 RX ADMIN — PROPOFOL 40 MCG/KG/MIN: 10 INJECTION, EMULSION INTRAVENOUS at 04:03

## 2019-03-20 RX ADMIN — DIGOXIN 500 MCG: 250 INJECTION, SOLUTION INTRAMUSCULAR; INTRAVENOUS; PARENTERAL at 04:03

## 2019-03-20 RX ADMIN — DIGOXIN 500 MCG: 0.25 INJECTION INTRAMUSCULAR; INTRAVENOUS at 04:03

## 2019-03-20 RX ADMIN — ASPIRIN 81 MG CHEWABLE TABLET 81 MG: 81 TABLET CHEWABLE at 08:03

## 2019-03-20 RX ADMIN — Medication 0.02 MCG/KG/MIN: at 07:03

## 2019-03-20 RX ADMIN — PROPOFOL 50 MCG/KG/MIN: 10 INJECTION, EMULSION INTRAVENOUS at 01:03

## 2019-03-20 RX ADMIN — DIGOXIN 250 MCG: 0.25 INJECTION INTRAMUSCULAR; INTRAVENOUS at 10:03

## 2019-03-20 RX ADMIN — SODIUM CHLORIDE, SODIUM LACTATE, POTASSIUM CHLORIDE, AND CALCIUM CHLORIDE 500 ML: .6; .31; .03; .02 INJECTION, SOLUTION INTRAVENOUS at 04:03

## 2019-03-20 NOTE — SUBJECTIVE & OBJECTIVE
Interval History: NAEON. EP consulted for cardioversion.     Review of Systems   Unable to perform ROS: intubated     Objective:     Vital Signs (Most Recent):  Temp: 99.4 °F (37.4 °C) (03/20/19 1100)  Pulse: (!) 130 (03/20/19 1253)  Resp: 16 (03/20/19 1253)  BP: (!) 74/49 (03/20/19 1200)  SpO2: 96 % (03/20/19 1253) Vital Signs (24h Range):  Temp:  [97.8 °F (36.6 °C)-99.4 °F (37.4 °C)] 99.4 °F (37.4 °C)  Pulse:  [] 130  Resp:  [16-47] 16  SpO2:  [94 %-100 %] 96 %  BP: ()/(49-89) 74/49     Weight: 90 kg (198 lb 6.6 oz)  Body mass index is 32.04 kg/m².     SpO2: 96 %  O2 Device (Oxygen Therapy): ventilator      Intake/Output Summary (Last 24 hours) at 3/20/2019 1405  Last data filed at 3/20/2019 1200  Gross per 24 hour   Intake 2451.38 ml   Output 2055 ml   Net 396.38 ml       Lines/Drains/Airways     Central Venous Catheter Line                 Percutaneous Central Line Insertion/Assessment - triple lumen  03/17/19 0300 right internal jugular 3 days          Drain                 NG/OG Tube 03/15/19 1150 [place] sump 18 Fr. Center mouth 5 days         Urethral Catheter 03/15/19 1206 Double-lumen 16 Fr. 5 days          Airway                 Airway - Non-Surgical 03/15/19 1151 Endotracheal Tube 5 days          Peripheral Intravenous Line                 Peripheral IV - Single Lumen 03/15/19 2000 Right Forearm 4 days                Physical Exam   Constitutional: He is oriented to person, place, and time. He appears well-developed and well-nourished. He is intubated.   HENT:   Head: Normocephalic and atraumatic.   Eyes: EOM are normal.   Cardiovascular: An irregularly irregular rhythm present. Tachycardia present. Exam reveals no gallop and no friction rub.   Pulmonary/Chest: Effort normal and breath sounds normal. No stridor. He is intubated. He has no wheezes. He has no rales.   Abdominal: Soft. Bowel sounds are normal. There is no rebound and no guarding.   Musculoskeletal: He exhibits no edema.    Neurological: He is alert and oriented to person, place, and time. No cranial nerve deficit.   Skin: Skin is warm and dry.   Psychiatric: He has a normal mood and affect. His behavior is normal.   Vitals reviewed.      Significant Labs: All pertinent lab results from the last 24 hours have been reviewed.    Significant Imaging: Reviewed

## 2019-03-20 NOTE — PLAN OF CARE
Problem: Adult Inpatient Plan of Care  Goal: Plan of Care Review  Outcome: Ongoing (interventions implemented as appropriate)  See vital signs and assessments in flowsheets. See below for updates on today's progress.      Pulmonary:  No oxygenation issues today.  Sat >95% for the shift; Ventilated on AC Mode with 21% FIO2       Cardiovascular:  A fib with HRs in the 80-130s.  MAP Maintained greater than 65mmHg; Weak pulses noted     Neurological: GCS 8/15; Localizes to pain; Pupils are 3mm on both eyes Brisk reactive to light; Afebrile; Pulls tube when awake, Bilateral soft wrist restraints for safety.       Gastrointestinal: Tube Feeding via OGT at 10cc/hr. Hypoactive bowel sounds noted; No bowel during the shift.    Genitourinary: Urine output of 70-150ml/hour via amaya catheter     Integumentary/Other: Skin intact     Infusions: Versed, propofol, heparin,

## 2019-03-20 NOTE — PROGRESS NOTES
[facility]  Cardiology  Progress Note    Patient Name: Donald Warren Abadie  MRN: [id number]  Admission Date: 3/15/2019  Hospital Length of Stay: 5 days  Code Status: Full Code   Attending Physician: Luis Miguel Lagos MD   Primary Care Physician: Bacilio Larry MD  Expected Discharge Date: 3/25/2019  Principal Problem:Alcohol withdrawal    Subjective:     Hospital Course:   Patient coded in ED, subsequently ROSC achieved and intubated. TVP placed in sterile fashion via the right IJ vein post-ROSC. Placed on versed gtt and propofol gtt. Vancomycin and cefepime started as concern for septic shock. TVP pulled on 3/16. 3/20 EKG shows A-flutter and Digoxin started. Plan for cardioversion 3/21.         Interval History: NAEON. EP consulted for cardioversion.     Review of Systems   Unable to perform ROS: intubated     Objective:     Vital Signs (Most Recent):  Temp: 99.4 °F (37.4 °C) (03/20/19 1100)  Pulse: (!) 130 (03/20/19 1253)  Resp: 16 (03/20/19 1253)  BP: (!) 74/49 (03/20/19 1200)  SpO2: 96 % (03/20/19 1253) Vital Signs (24h Range):  Temp:  [97.8 °F (36.6 °C)-99.4 °F (37.4 °C)] 99.4 °F (37.4 °C)  Pulse:  [] 130  Resp:  [16-47] 16  SpO2:  [94 %-100 %] 96 %  BP: ()/(49-89) 74/49     Weight: 90 kg (198 lb 6.6 oz)  Body mass index is 32.04 kg/m².     SpO2: 96 %  O2 Device (Oxygen Therapy): ventilator      Intake/Output Summary (Last 24 hours) at 3/20/2019 1405  Last data filed at 3/20/2019 1200  Gross per 24 hour   Intake 2451.38 ml   Output 2055 ml   Net 396.38 ml       Lines/Drains/Airways     Central Venous Catheter Line                 Percutaneous Central Line Insertion/Assessment - triple lumen  03/17/19 0300 right internal jugular 3 days          Drain                 NG/OG Tube 03/15/19 1150 [place] sump 18 Fr. Center mouth 5 days         Urethral Catheter 03/15/19 1206 Double-lumen 16 Fr. 5 days          Airway                 Airway - Non-Surgical 03/15/19 1151 Endotracheal Tube 5  days          Peripheral Intravenous Line                 Peripheral IV - Single Lumen 03/15/19 2000 Right Forearm 4 days                Physical Exam   Constitutional: He is oriented to person, place, and time. He appears well-developed and well-nourished. He is intubated.   HENT:   Head: Normocephalic and atraumatic.   Eyes: EOM are normal.   Cardiovascular: An irregularly irregular rhythm present. Tachycardia present. Exam reveals no gallop and no friction rub.   Pulmonary/Chest: Effort normal and breath sounds normal. No stridor. He is intubated. He has no wheezes. He has no rales.   Abdominal: Soft. Bowel sounds are normal. There is no rebound and no guarding.   Musculoskeletal: He exhibits no edema.   Neurological: He is alert and oriented to person, place, and time. No cranial nerve deficit.   Skin: Skin is warm and dry.   Psychiatric: He has a normal mood and affect. His behavior is normal.   Vitals reviewed.      Significant Labs: All pertinent lab results from the last 24 hours have been reviewed.    Significant Imaging: Reviewed    Assessment and Plan:         * Alcohol withdrawal    - sever presentation with agitation, hallucinations, and autonomic instability  - patient currently intubated and mechanically ventilated with continuous IV sedation ordered  - continue versed gtt and propofol gtt  - Weaned down versed gtt however, patient did not tolerate. Will increase back to 3 mg/hr and give patient 1-2 more days to recover  -Patient worsened overnight with decrease of propofol. Patient became tachycardic and sweaty and agitated, breathing over the vent. Will increase propofol to previous setting and give patient another day or two on vent. Patient with severe alcohol w/d that will likely take at least 5 days from time of alcohol clearance [3/16 with alc 078 level; 3/17= day 1 no alc).        Atrial flutter    - 3/20 EKG shows A-flutter this morning  - EP consulted and plan for cardioversion on 3/21  -  Digoxin started       On mechanically assisted ventilation    - intubated during ACLS  - AC mode with sedation  -FiO2 reduced to 21%, patient stable       Cardiac arrest    - obtain TTE with CFD  - cover with broad spectrum abx for now (leukocytosis post-ROSC)  - maintain K>4 and Mg>2  - obtain EEG   - treat BB poisoning     Lactic acidosis    - AGAP present prior to cardiac arrest  - lactic acid after arrest is 8.1  - ED has administered more isotonic fluid  - optimize cardiac profile  - obtain BCx x2, UA with reflex UCx  - administer broad spectrum antibiotics  - Blood cx NGTD  - Vancomycin and cefepime deescalated to Ceftriaxone   - Abx discontinued     LA 0.7  IMPROVED        Hyponatremia    - not explained by hyperglycemia and not on diuretics  - in the setting of JASS; ED administered isotonic fluid  - continue to monitor  - Improving with IV fluid administration    Na 134-135  RESOLVED        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    - prominent features include hypotension and bradycardia  - ECG with bradyarrhythmia; patient received fluid in the ED  - IV atropine, IV glucagon (bolus followed by gtt) and IV calcium  - TVP initially discussed with Dr. Alem Miller: recommended atropine 1 mg IV  - TVP placed post-ROSC with base rate set at 60 BPM  - TVP now pulled  - f/u TTE with CFD  - hold BB, CCB and flecainide  - Glucagon gtt discontinued. Wean off pressor today and re-start BB tomorrow as HR tolerates.        Diabetes mellitus without complication    - now off glucagon drip  - SSI     Paroxysmal atrial fibrillation    - hold BB, CCB and flecainide in the setting of bradycardia  - held NOAC for several days pending liver bx  - IV UFH in lieu of NOAC        Elevated LFTs    - consult hepatology with concern for alcoholic hepatitis  - scheduled for liver biopsy this month  - hold statin and fenofibrate  - optimize cardiac profile     Hypertriglyceridemia    - hold statin         VTE Risk  Mitigation (From admission, onward)        Ordered     heparin 25,000 units in dextrose 5% 250 mL (100 units/mL) infusion LOW INTENSITY nomogram - [facility]  Continuous      03/15/19 1036     heparin 25,000 units in dextrose 5% (100 units/ml) IV bolus from bag - ADDITIONAL PRN BOLUS - 60 units/kg  As needed (PRN)      03/15/19 1036     heparin 25,000 units in dextrose 5% (100 units/ml) IV bolus from bag - ADDITIONAL PRN BOLUS - 30 units/kg  As needed (PRN)      03/15/19 1036     IP VTE HIGH RISK PATIENT  Once      03/15/19 0847          Quique Gonzalez MD  Cardiology  [facility]

## 2019-03-20 NOTE — PLAN OF CARE
03/20/19 1239   Right Care Assessment   Can the patient answer the patient profile reliably? (pt is not medically ready for d/c- hr elevated, b/p low)

## 2019-03-20 NOTE — PLAN OF CARE
Problem: Adult Inpatient Plan of Care  Goal: Plan of Care Review  8  Goal: Patient-Specific Goal (Individualization)  Outcome: Ongoing (interventions implemented as appropriate)  See vital signs and assessments in flowsheets. See below for updates on today's progress.     Pulmonary: Pt on ventilator. Mode AC, tidal volume 500, FiO2 21%, PEEP 5. O2 Sats 96-98 %.    Cardiovascular: HR 130s A fib and A flutter. SBP 70-110s DBP 50-70s. MAP 60-84. CVP 5, 12, 8.    Neurological: pt is intubated, does not follow commands. Pupils unequal, round, brisk and reactive. Arouses to painful stimuli.     Gastrointestinal: OGT with trickle feeds held per MD orders. BMx1    Genitourinary: Lynn, adequate urine output 40cc-100cc/hr.     Integumentary/Other: Pt has been diaphoretic. Skin has been warm and cool to touch.     Infusions: Propofol, heparin, and versed gtts. LR bolus given.     Patient progressing towards goals as tolerated, plan of care communicated and reviewed with Donald Warren Abadie and daughter. All questions and concerns addressed. Will continue to monitor. Plan for cardioversion tomorrow.

## 2019-03-20 NOTE — ASSESSMENT & PLAN NOTE
- AGAP present prior to cardiac arrest  - lactic acid after arrest is 8.1  - ED has administered more isotonic fluid  - optimize cardiac profile  - obtain BCx x2, UA with reflex UCx  - administer broad spectrum antibiotics  - Blood cx NGTD  - Vancomycin and cefepime deescalated to Ceftriaxone   - Abx discontinued     LA 0.7  IMPROVED

## 2019-03-20 NOTE — PLAN OF CARE
Problem: Adult Inpatient Plan of Care  Goal: Plan of Care Review  Outcome: Ongoing (interventions implemented as appropriate)  Patient intubated/sedated.  DaughterChani, at bedside most of the day.      Neuro - Pupils reactive.  Used the pupillometer throughout shift.      Respiratory - ETT measures 25 @ the lip.  AC,  Rate 16.  Peep 5/FiO2 21%.  No oxygenation issues today.  Sat >95% all shift.     Cardiovascular - A fib with HRs in the 80-120s.  Increased HR with turning and repositioning.  No SBT done today 2/2 withdrawal symptoms last night (elevated HR, diaphoresis)    GI - 1 BM today - soft, light brown.  TF via OGT at 10cc/hr.       - Lynn in place.  Greater than 1 L urine output today.     Skin intact - no issues.  Heel boots in place.      Drips - Versed, propofol, heparin, IV electrolyte replacement today (Mag and K+)    Bilateral soft wrist restraints for safety.      [facility] [employer] visited with Chani fried, today to discuss how to tell Mr. Abadie's grandchildren about his hospitalization.  [facility] gave resources and provided a phone # to call tomorrow if needed for additional assistance/information.      Problem: Skin Injury Risk Increased  Goal: Skin Health and Integrity  Outcome: Ongoing (interventions implemented as appropriate)  Turned q 2 hours with the wedge/pillows.      Bath and linen change completed.  Skin kept dry and intact.

## 2019-03-21 LAB
ALBUMIN SERPL BCP-MCNC: 2.6 G/DL
ALLENS TEST: ABNORMAL
ALP SERPL-CCNC: 52 U/L
ALT SERPL W/O P-5'-P-CCNC: 69 U/L
AMMONIA PLAS-SCNC: 33 UMOL/L
ANION GAP SERPL CALC-SCNC: 10 MMOL/L
ANION GAP SERPL CALC-SCNC: 12 MMOL/L
APTT BLDCRRT: 53 SEC
AST SERPL-CCNC: 49 U/L
BACTERIA #/AREA URNS AUTO: ABNORMAL /HPF
BASOPHILS # BLD AUTO: 0.07 K/UL
BASOPHILS NFR BLD: 1 %
BILIRUB SERPL-MCNC: 0.9 MG/DL
BILIRUB UR QL STRIP: NEGATIVE
BUN SERPL-MCNC: 10 MG/DL
BUN SERPL-MCNC: 9 MG/DL
CALCIUM SERPL-MCNC: 9.6 MG/DL
CALCIUM SERPL-MCNC: 9.7 MG/DL
CHLORIDE SERPL-SCNC: 102 MMOL/L
CHLORIDE SERPL-SCNC: 103 MMOL/L
CLARITY UR REFRACT.AUTO: CLEAR
CO2 SERPL-SCNC: 23 MMOL/L
CO2 SERPL-SCNC: 24 MMOL/L
COLOR UR AUTO: YELLOW
CREAT SERPL-MCNC: 0.8 MG/DL
CREAT SERPL-MCNC: 0.9 MG/DL
DELSYS: ABNORMAL
DIFFERENTIAL METHOD: ABNORMAL
EOSINOPHIL # BLD AUTO: 0.1 K/UL
EOSINOPHIL NFR BLD: 1.5 %
ERYTHROCYTE [DISTWIDTH] IN BLOOD BY AUTOMATED COUNT: 13.7 %
ERYTHROCYTE [SEDIMENTATION RATE] IN BLOOD BY WESTERGREN METHOD: 16 MM/H
ERYTHROCYTE [SEDIMENTATION RATE] IN BLOOD BY WESTERGREN METHOD: 26 MM/H
ERYTHROCYTE [SEDIMENTATION RATE] IN BLOOD BY WESTERGREN METHOD: 28 MM/H
EST. GFR  (AFRICAN AMERICAN): >60 ML/MIN/1.73 M^2
EST. GFR  (AFRICAN AMERICAN): >60 ML/MIN/1.73 M^2
EST. GFR  (NON AFRICAN AMERICAN): >60 ML/MIN/1.73 M^2
EST. GFR  (NON AFRICAN AMERICAN): >60 ML/MIN/1.73 M^2
FIO2: 21
FIO2: 21
FIO2: 28
FLOW: 2
GLUCOSE SERPL-MCNC: 147 MG/DL
GLUCOSE SERPL-MCNC: 169 MG/DL
GLUCOSE UR QL STRIP: ABNORMAL
HCO3 UR-SCNC: 21.1 MMOL/L (ref 24–28)
HCO3 UR-SCNC: 23.4 MMOL/L (ref 24–28)
HCO3 UR-SCNC: 24.9 MMOL/L (ref 24–28)
HCT VFR BLD AUTO: 35.3 %
HGB BLD-MCNC: 11.1 G/DL
HGB UR QL STRIP: ABNORMAL
IMM GRANULOCYTES # BLD AUTO: 0.21 K/UL
IMM GRANULOCYTES NFR BLD AUTO: 2.9 %
INR PPP: 1
KETONES UR QL STRIP: NEGATIVE
LEUKOCYTE ESTERASE UR QL STRIP: NEGATIVE
LYMPHOCYTES # BLD AUTO: 1.7 K/UL
LYMPHOCYTES NFR BLD: 23 %
MAGNESIUM SERPL-MCNC: 1.5 MG/DL
MAGNESIUM SERPL-MCNC: 1.9 MG/DL
MCH RBC QN AUTO: 32.9 PG
MCHC RBC AUTO-ENTMCNC: 31.4 G/DL
MCV RBC AUTO: 105 FL
MICROSCOPIC COMMENT: ABNORMAL
MIN VOL: 8.08
MODE: ABNORMAL
MONOCYTES # BLD AUTO: 1 K/UL
MONOCYTES NFR BLD: 13.3 %
NEUTROPHILS # BLD AUTO: 4.3 K/UL
NEUTROPHILS NFR BLD: 58.3 %
NITRITE UR QL STRIP: NEGATIVE
NRBC BLD-RTO: 1 /100 WBC
PCO2 BLDA: 28.8 MMHG (ref 35–45)
PCO2 BLDA: 34.1 MMHG (ref 35–45)
PCO2 BLDA: 43.5 MMHG (ref 35–45)
PEEP: 5
PH SMN: 7.37 [PH] (ref 7.35–7.45)
PH SMN: 7.44 [PH] (ref 7.35–7.45)
PH SMN: 7.47 [PH] (ref 7.35–7.45)
PH UR STRIP: 6 [PH] (ref 5–8)
PIP: 36
PLATELET # BLD AUTO: 124 K/UL
PMV BLD AUTO: 10.7 FL
PO2 BLDA: 37 MMHG (ref 40–60)
PO2 BLDA: 75 MMHG (ref 80–100)
PO2 BLDA: 97 MMHG (ref 80–100)
POC BE: -1 MMOL/L
POC BE: -3 MMOL/L
POC BE: 0 MMOL/L
POC SATURATED O2: 68 % (ref 95–100)
POC SATURATED O2: 96 % (ref 95–100)
POC SATURATED O2: 98 % (ref 95–100)
POC TCO2: 22 MMOL/L (ref 23–27)
POC TCO2: 24 MMOL/L (ref 23–27)
POC TCO2: 26 MMOL/L (ref 24–29)
POTASSIUM SERPL-SCNC: 3.6 MMOL/L
POTASSIUM SERPL-SCNC: 4.1 MMOL/L
PROT SERPL-MCNC: 6.2 G/DL
PROT UR QL STRIP: NEGATIVE
PROTHROMBIN TIME: 10.7 SEC
RBC # BLD AUTO: 3.37 M/UL
RBC #/AREA URNS AUTO: 5 /HPF (ref 0–4)
SAMPLE: ABNORMAL
SITE: ABNORMAL
SODIUM SERPL-SCNC: 137 MMOL/L
SODIUM SERPL-SCNC: 137 MMOL/L
SP GR UR STRIP: 1.01 (ref 1–1.03)
SP02: 92
SP02: 95
SP02: 97
URN SPEC COLLECT METH UR: ABNORMAL
VT: 500
WBC # BLD AUTO: 7.35 K/UL
WBC #/AREA URNS AUTO: 1 /HPF (ref 0–5)
YEAST UR QL AUTO: ABNORMAL

## 2019-03-21 PROCEDURE — 81001 URINALYSIS AUTO W/SCOPE: CPT

## 2019-03-21 PROCEDURE — 63600175 PHARM REV CODE 636 W HCPCS: Performed by: INTERNAL MEDICINE

## 2019-03-21 PROCEDURE — 94003 VENT MGMT INPAT SUBQ DAY: CPT

## 2019-03-21 PROCEDURE — 99233 SBSQ HOSP IP/OBS HIGH 50: CPT | Mod: ,,, | Performed by: INTERNAL MEDICINE

## 2019-03-21 PROCEDURE — 25000003 PHARM REV CODE 250

## 2019-03-21 PROCEDURE — 20000000 HC ICU ROOM

## 2019-03-21 PROCEDURE — 25000003 PHARM REV CODE 250: Performed by: INTERNAL MEDICINE

## 2019-03-21 PROCEDURE — 87205 SMEAR GRAM STAIN: CPT

## 2019-03-21 PROCEDURE — 87040 BLOOD CULTURE FOR BACTERIA: CPT

## 2019-03-21 PROCEDURE — 25000003 PHARM REV CODE 250: Performed by: STUDENT IN AN ORGANIZED HEALTH CARE EDUCATION/TRAINING PROGRAM

## 2019-03-21 PROCEDURE — 80048 BASIC METABOLIC PNL TOTAL CA: CPT

## 2019-03-21 PROCEDURE — 99900035 HC TECH TIME PER 15 MIN (STAT)

## 2019-03-21 PROCEDURE — 99900026 HC AIRWAY MAINTENANCE (STAT)

## 2019-03-21 PROCEDURE — 82803 BLOOD GASES ANY COMBINATION: CPT

## 2019-03-21 PROCEDURE — 94010 BREATHING CAPACITY TEST: CPT

## 2019-03-21 PROCEDURE — 85610 PROTHROMBIN TIME: CPT

## 2019-03-21 PROCEDURE — 80053 COMPREHEN METABOLIC PANEL: CPT

## 2019-03-21 PROCEDURE — 27000221 HC OXYGEN, UP TO 24 HOURS

## 2019-03-21 PROCEDURE — 94761 N-INVAS EAR/PLS OXIMETRY MLT: CPT

## 2019-03-21 PROCEDURE — 82140 ASSAY OF AMMONIA: CPT

## 2019-03-21 PROCEDURE — 85730 THROMBOPLASTIN TIME PARTIAL: CPT

## 2019-03-21 PROCEDURE — 94640 AIRWAY INHALATION TREATMENT: CPT

## 2019-03-21 PROCEDURE — 99233 PR SUBSEQUENT HOSPITAL CARE,LEVL III: ICD-10-PCS | Mod: ,,, | Performed by: INTERNAL MEDICINE

## 2019-03-21 PROCEDURE — 94150 VITAL CAPACITY TEST: CPT

## 2019-03-21 PROCEDURE — 36600 WITHDRAWAL OF ARTERIAL BLOOD: CPT

## 2019-03-21 PROCEDURE — 87070 CULTURE OTHR SPECIMN AEROBIC: CPT

## 2019-03-21 PROCEDURE — 83735 ASSAY OF MAGNESIUM: CPT | Mod: 91

## 2019-03-21 PROCEDURE — A4216 STERILE WATER/SALINE, 10 ML: HCPCS | Performed by: INTERNAL MEDICINE

## 2019-03-21 PROCEDURE — 83735 ASSAY OF MAGNESIUM: CPT

## 2019-03-21 PROCEDURE — 63600175 PHARM REV CODE 636 W HCPCS: Performed by: STUDENT IN AN ORGANIZED HEALTH CARE EDUCATION/TRAINING PROGRAM

## 2019-03-21 PROCEDURE — C9113 INJ PANTOPRAZOLE SODIUM, VIA: HCPCS | Performed by: INTERNAL MEDICINE

## 2019-03-21 PROCEDURE — 99900017 HC EXTUBATION W/PARAMETERS (STAT)

## 2019-03-21 PROCEDURE — 25000242 PHARM REV CODE 250 ALT 637 W/ HCPCS: Performed by: STUDENT IN AN ORGANIZED HEALTH CARE EDUCATION/TRAINING PROGRAM

## 2019-03-21 PROCEDURE — 85025 COMPLETE CBC W/AUTO DIFF WBC: CPT

## 2019-03-21 RX ORDER — SODIUM CHLORIDE, SODIUM LACTATE, POTASSIUM CHLORIDE, CALCIUM CHLORIDE 600; 310; 30; 20 MG/100ML; MG/100ML; MG/100ML; MG/100ML
INJECTION, SOLUTION INTRAVENOUS CONTINUOUS
Status: DISCONTINUED | OUTPATIENT
Start: 2019-03-21 | End: 2019-03-22

## 2019-03-21 RX ORDER — METOPROLOL TARTRATE 1 MG/ML
5 INJECTION, SOLUTION INTRAVENOUS ONCE
Status: COMPLETED | OUTPATIENT
Start: 2019-03-21 | End: 2019-03-21

## 2019-03-21 RX ORDER — DIGOXIN 0.25 MG/ML
250 INJECTION INTRAMUSCULAR; INTRAVENOUS ONCE
Status: COMPLETED | OUTPATIENT
Start: 2019-03-21 | End: 2019-03-21

## 2019-03-21 RX ORDER — METOPROLOL TARTRATE 25 MG/1
25 TABLET, FILM COATED ORAL 2 TIMES DAILY
Status: DISCONTINUED | OUTPATIENT
Start: 2019-03-21 | End: 2019-03-21

## 2019-03-21 RX ORDER — IPRATROPIUM BROMIDE AND ALBUTEROL SULFATE 2.5; .5 MG/3ML; MG/3ML
3 SOLUTION RESPIRATORY (INHALATION)
Status: DISCONTINUED | OUTPATIENT
Start: 2019-03-21 | End: 2019-03-21

## 2019-03-21 RX ORDER — CEFEPIME HYDROCHLORIDE 1 G/1
1 INJECTION, POWDER, FOR SOLUTION INTRAMUSCULAR; INTRAVENOUS
Status: DISCONTINUED | OUTPATIENT
Start: 2019-03-21 | End: 2019-03-23

## 2019-03-21 RX ORDER — FOLIC ACID 1 MG/1
2 TABLET ORAL DAILY
Status: DISCONTINUED | OUTPATIENT
Start: 2019-03-22 | End: 2019-03-22

## 2019-03-21 RX ORDER — VANCOMYCIN HCL IN 5 % DEXTROSE 1.25 G/25
1250 PLASTIC BAG, INJECTION (ML) INTRAVENOUS
Status: DISCONTINUED | OUTPATIENT
Start: 2019-03-21 | End: 2019-03-23

## 2019-03-21 RX ORDER — METOPROLOL TARTRATE 1 MG/ML
5 INJECTION, SOLUTION INTRAVENOUS EVERY 6 HOURS
Status: DISCONTINUED | OUTPATIENT
Start: 2019-03-21 | End: 2019-03-22

## 2019-03-21 RX ORDER — SODIUM CHLORIDE 9 MG/ML
INJECTION, SOLUTION INTRAVENOUS CONTINUOUS
Status: DISCONTINUED | OUTPATIENT
Start: 2019-03-21 | End: 2019-03-21

## 2019-03-21 RX ORDER — IPRATROPIUM BROMIDE AND ALBUTEROL SULFATE 2.5; .5 MG/3ML; MG/3ML
3 SOLUTION RESPIRATORY (INHALATION) EVERY 4 HOURS PRN
Status: DISCONTINUED | OUTPATIENT
Start: 2019-03-21 | End: 2019-03-29 | Stop reason: HOSPADM

## 2019-03-21 RX ORDER — LORAZEPAM 1 MG/1
2 TABLET ORAL EVERY 4 HOURS PRN
Status: DISCONTINUED | OUTPATIENT
Start: 2019-03-21 | End: 2019-03-21

## 2019-03-21 RX ORDER — LORAZEPAM 2 MG/ML
2 INJECTION INTRAMUSCULAR
Status: DISCONTINUED | OUTPATIENT
Start: 2019-03-21 | End: 2019-03-22

## 2019-03-21 RX ORDER — METOPROLOL TARTRATE 1 MG/ML
INJECTION, SOLUTION INTRAVENOUS
Status: COMPLETED
Start: 2019-03-21 | End: 2019-03-21

## 2019-03-21 RX ADMIN — DIGOXIN 250 MCG: 250 INJECTION, SOLUTION INTRAMUSCULAR; INTRAVENOUS; PARENTERAL at 06:03

## 2019-03-21 RX ADMIN — IPRATROPIUM BROMIDE AND ALBUTEROL SULFATE 3 ML: .5; 3 SOLUTION RESPIRATORY (INHALATION) at 01:03

## 2019-03-21 RX ADMIN — MIDAZOLAM HYDROCHLORIDE 2 MG/HR: 5 INJECTION, SOLUTION INTRAMUSCULAR; INTRAVENOUS at 02:03

## 2019-03-21 RX ADMIN — PROPOFOL 25 MCG/KG/MIN: 10 INJECTION, EMULSION INTRAVENOUS at 05:03

## 2019-03-21 RX ADMIN — POTASSIUM CHLORIDE 40 MEQ: 400 INJECTION, SOLUTION INTRAVENOUS at 05:03

## 2019-03-21 RX ADMIN — SODIUM CHLORIDE, SODIUM LACTATE, POTASSIUM CHLORIDE, AND CALCIUM CHLORIDE: .6; .31; .03; .02 INJECTION, SOLUTION INTRAVENOUS at 04:03

## 2019-03-21 RX ADMIN — HEPARIN SODIUM AND DEXTROSE 25 UNITS/KG/HR: 10000; 5 INJECTION INTRAVENOUS at 04:03

## 2019-03-21 RX ADMIN — HEPARIN SODIUM AND DEXTROSE 25 UNITS/KG/HR: 10000; 5 INJECTION INTRAVENOUS at 05:03

## 2019-03-21 RX ADMIN — Medication 1250 MG: at 06:03

## 2019-03-21 RX ADMIN — Medication 3 ML: at 02:03

## 2019-03-21 RX ADMIN — CEFEPIME 1 G: 1 INJECTION, POWDER, FOR SOLUTION INTRAMUSCULAR; INTRAVENOUS at 07:03

## 2019-03-21 RX ADMIN — LORAZEPAM 2 MG: 2 INJECTION INTRAMUSCULAR; INTRAVENOUS at 04:03

## 2019-03-21 RX ADMIN — IPRATROPIUM BROMIDE AND ALBUTEROL SULFATE 3 ML: .5; 3 SOLUTION RESPIRATORY (INHALATION) at 04:03

## 2019-03-21 RX ADMIN — Medication 100 MG: at 09:03

## 2019-03-21 RX ADMIN — METOPROLOL TARTRATE 5 MG: 1 INJECTION, SOLUTION INTRAVENOUS at 03:03

## 2019-03-21 RX ADMIN — ALLOPURINOL 200 MG: 100 TABLET ORAL at 09:03

## 2019-03-21 RX ADMIN — PANTOPRAZOLE SODIUM 40 MG: 40 INJECTION, POWDER, FOR SOLUTION INTRAVENOUS at 09:03

## 2019-03-21 RX ADMIN — SODIUM CHLORIDE, SODIUM LACTATE, POTASSIUM CHLORIDE, AND CALCIUM CHLORIDE 200 ML: .6; .31; .03; .02 INJECTION, SOLUTION INTRAVENOUS at 03:03

## 2019-03-21 RX ADMIN — CHLORHEXIDINE GLUCONATE 0.12% ORAL RINSE 15 ML: 1.2 LIQUID ORAL at 09:03

## 2019-03-21 RX ADMIN — METOPROLOL TARTRATE 5 MG: 5 INJECTION, SOLUTION INTRAVENOUS at 06:03

## 2019-03-21 RX ADMIN — METOPROLOL TARTRATE 5 MG: 5 INJECTION INTRAVENOUS at 03:03

## 2019-03-21 RX ADMIN — METOPROLOL TARTRATE 25 MG: 25 TABLET ORAL at 09:03

## 2019-03-21 RX ADMIN — LORAZEPAM 2 MG: 2 INJECTION INTRAMUSCULAR; INTRAVENOUS at 10:03

## 2019-03-21 RX ADMIN — ASPIRIN 81 MG CHEWABLE TABLET 81 MG: 81 TABLET CHEWABLE at 09:03

## 2019-03-21 RX ADMIN — LORAZEPAM 2 MG: 2 INJECTION INTRAMUSCULAR; INTRAVENOUS at 08:03

## 2019-03-21 RX ADMIN — LORAZEPAM 2 MG: 2 INJECTION INTRAMUSCULAR; INTRAVENOUS at 01:03

## 2019-03-21 RX ADMIN — DIGOXIN 250 MCG: 0.25 INJECTION INTRAMUSCULAR; INTRAVENOUS at 04:03

## 2019-03-21 RX ADMIN — MAGNESIUM SULFATE 2 G: 2 INJECTION INTRAVENOUS at 05:03

## 2019-03-21 NOTE — PLAN OF CARE
CM following today. Vented. Not medically stable     03/21/19 1208   Discharge Assessment   Assessment Type Discharge Planning Assessment

## 2019-03-21 NOTE — PROGRESS NOTES
[facility]  Cardiology  Progress Note    Patient Name: Donald Warren Abadie  MRN: [id number]  Admission Date: 3/15/2019  Hospital Length of Stay: 6 days  Code Status: Full Code   Attending Physician: Luis Miguel Lagos MD   Primary Care Physician: Bacilio Larry MD  Expected Discharge Date: 3/25/2019  Principal Problem:Alcohol withdrawal    Subjective:     Hospital Course:   Patient coded in ED, subsequently ROSC achieved and intubated. TVP placed in sterile fashion via the right IJ vein post-ROSC. Placed on versed gtt and propofol gtt. Vancomycin and cefepime started as concern for septic shock. TVP pulled on 3/16. 3/20 EKG shows A-flutter and Digoxin started. Overnight, converted to A-fib and metoprolol started. Extubated on 3/21.         Interval History: Convert from a-flutter to a-fib overnight and metoprolol started. Extubated today.     Review of Systems   Unable to perform ROS: intubated     Objective:     Vital Signs (Most Recent):  Temp: 99 °F (37.2 °C) (03/21/19 1100)  Pulse: 94 (03/21/19 1200)  Resp: (!) 30 (03/21/19 1200)  BP: 103/64 (03/21/19 1200)  SpO2: (!) 93 % (03/21/19 1200) Vital Signs (24h Range):  Temp:  [98.8 °F (37.1 °C)-100.7 °F (38.2 °C)] 99 °F (37.2 °C)  Pulse:  [] 94  Resp:  [14-41] 30  SpO2:  [91 %-98 %] 93 %  BP: ()/(51-85) 103/64     Weight: 91.1 kg (200 lb 13.4 oz)  Body mass index is 32.44 kg/m².     SpO2: (!) 93 %  O2 Device (Oxygen Therapy): ventilator      Intake/Output Summary (Last 24 hours) at 3/21/2019 1305  Last data filed at 3/21/2019 1200  Gross per 24 hour   Intake 1556.31 ml   Output 3020 ml   Net -1463.69 ml       Lines/Drains/Airways     Central Venous Catheter Line                 Percutaneous Central Line Insertion/Assessment - triple lumen  03/17/19 0300 right internal jugular 4 days          Drain                 NG/OG Tube 03/15/19 1150 [place] sump 18 Fr. Center mouth 6 days         Urethral Catheter 03/15/19 1206 Double-lumen 16 Fr. 6  days          Airway                 Airway - Non-Surgical 03/15/19 1151 Endotracheal Tube 6 days          Peripheral Intravenous Line                 Peripheral IV - Single Lumen 03/15/19 2000 Right Forearm 5 days                Physical Exam   Constitutional: He is oriented to person, place, and time. He appears well-developed and well-nourished. He is intubated.   HENT:   Head: Normocephalic and atraumatic.   Eyes: EOM are normal.   Cardiovascular: An irregularly irregular rhythm present. Tachycardia present. Exam reveals no gallop and no friction rub.   Pulmonary/Chest: Effort normal and breath sounds normal. No stridor. He is intubated. He has no wheezes. He has no rales.   Abdominal: Soft. Bowel sounds are normal. There is no rebound and no guarding.   Musculoskeletal: He exhibits no edema.   Neurological: He is alert and oriented to person, place, and time. No cranial nerve deficit.   Skin: Skin is warm and dry.   Psychiatric: He has a normal mood and affect. His behavior is normal.   Vitals reviewed.      Significant Labs: All pertinent lab results from the last 24 hours have been reviewed.    Significant Imaging: Reviewed.     Assessment and Plan:       * Alcohol withdrawal    - sever presentation with agitation, hallucinations, and autonomic instability  - patient currently intubated and mechanically ventilated with continuous IV sedation ordered  - continue versed gtt and propofol gtt  - Weaned down versed gtt however, patient did not tolerate. Will increase back to 3 mg/hr and give patient 1-2 more days to recover  -Patient worsened overnight with decrease of propofol. Patient became tachycardic and sweaty and agitated, breathing over the vent. Will increase propofol to previous setting and give patient another day or two on vent. Patient with severe alcohol w/d that will likely take at least 5 days from time of alcohol clearance [3/16 with alc 078 level; 3/17= day 1 no alc).   - Off versed gtt, Ativan  prn for withdrawal symptoms  - Will start Librium taper        Atrial flutter    - 3/20 EKG shows A-flutter this morning  - EP consulted and plan for cardioversion on 3/21  - Digoxin started    - Converted to a-fib overnight     On mechanically assisted ventilation    - intubated during ACLS  - AC mode with sedation  -FiO2 reduced to 21%, patient stable  - 3/21 extubated      Cardiac arrest    - obtain TTE with CFD  - cover with broad spectrum abx for now (leukocytosis post-ROSC)  - maintain K>4 and Mg>2  - obtain EEG   - treat BB poisoning     Lactic acidosis    - AGAP present prior to cardiac arrest  - lactic acid after arrest is 8.1  - ED has administered more isotonic fluid  - optimize cardiac profile  - obtain BCx x2, UA with reflex UCx  - administer broad spectrum antibiotics  - Blood cx NGTD  - Vancomycin and cefepime deescalated to Ceftriaxone   - Abx discontinued     LA 0.7  IMPROVED        Hyponatremia    - not explained by hyperglycemia and not on diuretics  - in the setting of JASS; ED administered isotonic fluid  - continue to monitor  - Improving with IV fluid administration    Na 134-135  RESOLVED        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    - prominent features include hypotension and bradycardia  - ECG with bradyarrhythmia; patient received fluid in the ED  - IV atropine, IV glucagon (bolus followed by gtt) and IV calcium  - TVP initially discussed with Dr. Alem Miller: recommended atropine 1 mg IV  - TVP placed post-ROSC with base rate set at 60 BPM  - TVP now pulled  - f/u TTE with CFD  - hold BB, CCB and flecainide  - Glucagon gtt discontinued. Wean off pressor today and re-start BB tomorrow as HR tolerates.        Diabetes mellitus without complication    - now off glucagon drip  - SSI     Paroxysmal atrial fibrillation    - hold BB, CCB and flecainide in the setting of bradycardia  - held NOAC for several days pending liver bx  - IV UFH in lieu of NOAC   - Converted from  a-flutter to a-fib overnight and metoprolol started.       Elevated LFTs    - consult hepatology with concern for alcoholic hepatitis  - scheduled for liver biopsy this month  - hold statin and fenofibrate  - optimize cardiac profile     Hypertriglyceridemia    - hold statin         VTE Risk Mitigation (From admission, onward)        Ordered     heparin 25,000 units in dextrose 5% 250 mL (100 units/mL) infusion LOW INTENSITY nomogram - [facility]  Continuous      03/15/19 1036     heparin 25,000 units in dextrose 5% (100 units/ml) IV bolus from bag - ADDITIONAL PRN BOLUS - 60 units/kg  As needed (PRN)      03/15/19 1036     heparin 25,000 units in dextrose 5% (100 units/ml) IV bolus from bag - ADDITIONAL PRN BOLUS - 30 units/kg  As needed (PRN)      03/15/19 1036     IP VTE HIGH RISK PATIENT  Once      03/15/19 0847          Quique Gonzalez MD  Cardiology  [facility]

## 2019-03-21 NOTE — SUBJECTIVE & OBJECTIVE
Interval History: Convert from a-flutter to a-fib overnight and metoprolol started. Extubated today.     Review of Systems   Unable to perform ROS: intubated     Objective:     Vital Signs (Most Recent):  Temp: 99 °F (37.2 °C) (03/21/19 1100)  Pulse: 94 (03/21/19 1200)  Resp: (!) 30 (03/21/19 1200)  BP: 103/64 (03/21/19 1200)  SpO2: (!) 93 % (03/21/19 1200) Vital Signs (24h Range):  Temp:  [98.8 °F (37.1 °C)-100.7 °F (38.2 °C)] 99 °F (37.2 °C)  Pulse:  [] 94  Resp:  [14-41] 30  SpO2:  [91 %-98 %] 93 %  BP: ()/(51-85) 103/64     Weight: 91.1 kg (200 lb 13.4 oz)  Body mass index is 32.44 kg/m².     SpO2: (!) 93 %  O2 Device (Oxygen Therapy): ventilator      Intake/Output Summary (Last 24 hours) at 3/21/2019 1305  Last data filed at 3/21/2019 1200  Gross per 24 hour   Intake 1556.31 ml   Output 3020 ml   Net -1463.69 ml       Lines/Drains/Airways     Central Venous Catheter Line                 Percutaneous Central Line Insertion/Assessment - triple lumen  03/17/19 0300 right internal jugular 4 days          Drain                 NG/OG Tube 03/15/19 1150 [place] sump 18 Fr. Center mouth 6 days         Urethral Catheter 03/15/19 1206 Double-lumen 16 Fr. 6 days          Airway                 Airway - Non-Surgical 03/15/19 1151 Endotracheal Tube 6 days          Peripheral Intravenous Line                 Peripheral IV - Single Lumen 03/15/19 2000 Right Forearm 5 days                Physical Exam   Constitutional: He is oriented to person, place, and time. He appears well-developed and well-nourished. He is intubated.   HENT:   Head: Normocephalic and atraumatic.   Eyes: EOM are normal.   Cardiovascular: An irregularly irregular rhythm present. Tachycardia present. Exam reveals no gallop and no friction rub.   Pulmonary/Chest: Effort normal and breath sounds normal. No stridor. He is intubated. He has no wheezes. He has no rales.   Abdominal: Soft. Bowel sounds are normal. There is no rebound and no guarding.    Musculoskeletal: He exhibits no edema.   Neurological: He is alert and oriented to person, place, and time. No cranial nerve deficit.   Skin: Skin is warm and dry.   Psychiatric: He has a normal mood and affect. His behavior is normal.   Vitals reviewed.      Significant Labs: All pertinent lab results from the last 24 hours have been reviewed.    Significant Imaging: Reviewed.

## 2019-03-21 NOTE — ASSESSMENT & PLAN NOTE
- hold BB, CCB and flecainide in the setting of bradycardia  - held NOAC for several days pending liver bx  - IV UFH in lieu of NOAC   - Converted from a-flutter to a-fib overnight and metoprolol started.

## 2019-03-21 NOTE — NURSING
Cardiology service Dr. Lagos, Dr. Hogan, Dr. Gonzalez at bedside. Pt was extubated to 2L NC. Pt had some wheezing noted, MD ordered for Neb treatment to be given. MD also ordered for Ativan to be given for alcohol withdrawal and Speech/Swallow evaluation. Will continue to monitor patient closely.

## 2019-03-21 NOTE — ASSESSMENT & PLAN NOTE
- intubated during ACLS  - AC mode with sedation  -FiO2 reduced to 21%, patient stable  - 3/21 extubated

## 2019-03-21 NOTE — ASSESSMENT & PLAN NOTE
- sever presentation with agitation, hallucinations, and autonomic instability  - patient currently intubated and mechanically ventilated with continuous IV sedation ordered  - continue versed gtt and propofol gtt  - Weaned down versed gtt however, patient did not tolerate. Will increase back to 3 mg/hr and give patient 1-2 more days to recover  -Patient worsened overnight with decrease of propofol. Patient became tachycardic and sweaty and agitated, breathing over the vent. Will increase propofol to previous setting and give patient another day or two on vent. Patient with severe alcohol w/d that will likely take at least 5 days from time of alcohol clearance [3/16 with alc 078 level; 3/17= day 1 no alc).   - Off versed gtt, Ativan prn for withdrawal symptoms  - Will start Librium taper

## 2019-03-21 NOTE — PLAN OF CARE
Problem: Adult Inpatient Plan of Care  Goal: Plan of Care Review  See vital signs and assessments in flowsheets. See below for updates on today's progress.       Pulmonary:  Patient remains ventilated on AC mode with FIO2 of 21%,Rate decreased to 14, TV - 500, PEEP - 5; SPO2 maintained %. Coase breath sounds noted; clear thick secretions suctioned from ETT. CXR done, to follow up for the result.    Cardiovascular: MAP maintained >65mmHg; Levophed was turned off at 2210hours; Consistent Atrial fibrillation noted with HR of 70s-90s. Digoxin given at 11pm and 5am as ordered; CVP readings are 7, 5, 7  within the shift.    Neurological: Patient is GCS 7/15, withdraws to pain; pupil size is 3mm on right eye and 3.5-4mm on left eye, brisk reaction to light. Patient is febrile with highest temperature of 100.7*F, informed Dr. Graham about fever noted, he ordered not to give acetaminophen due to liver issues. Cooling measures rendered, ice packs applied to patient. Latest temperature is 99.1*F.      Gastrointestinal: Patient remains on NPO for JOHN and possible cardioversion tomorrow. Bowel sounds are hypoactive, Last bowel movement is on 3/19/2019    Genitourinary: Urine output is 100 to 250 ml/hour via amaya catheter.     Integumentary/Other: Skin is intact     Infusions: Midazolam at 2mg/hour                    Propofol at 25mcg/kg/min         Heparin at 25 units/kg/hr                    Magnesium Sulfate 2G and KCL 20mmol IV given                   Plan of care communicated and reviewed with daughter of Mr. Donald Abadie. Concerns addressed, questions answered. Will continue to monitor.

## 2019-03-21 NOTE — NURSING
Cardiology service Dr. Lagos at bedside, came in and assessed patient. MD aware that pt's HR in 130-150s, MD ordered for Metoprolol 5mg IVP x1 dose to be given. MD also ordered for 200ml bolus LR and to follow with LR fluids at 50ml/hr for 10 hours and to closely monitor urine output. MD updated pt's family about plan of care. Will continue to monitor closely.

## 2019-03-22 PROBLEM — A41.9 SEPSIS: Status: RESOLVED | Noted: 2019-03-16 | Resolved: 2019-03-22

## 2019-03-22 PROBLEM — R50.9 FEVER: Status: ACTIVE | Noted: 2019-03-22

## 2019-03-22 PROBLEM — T44.7X1A: Status: RESOLVED | Noted: 2019-03-15 | Resolved: 2019-03-22

## 2019-03-22 PROBLEM — G93.40 ENCEPHALOPATHY: Status: ACTIVE | Noted: 2019-03-22

## 2019-03-22 PROBLEM — Z99.11 ON MECHANICALLY ASSISTED VENTILATION: Status: RESOLVED | Noted: 2019-03-15 | Resolved: 2019-03-22

## 2019-03-22 LAB
ALBUMIN SERPL BCP-MCNC: 2.9 G/DL
ALP SERPL-CCNC: 51 U/L
ALT SERPL W/O P-5'-P-CCNC: 62 U/L
ANION GAP SERPL CALC-SCNC: 11 MMOL/L
ANION GAP SERPL CALC-SCNC: 12 MMOL/L
APTT BLDCRRT: 51.5 SEC
AST SERPL-CCNC: 56 U/L
BASOPHILS # BLD AUTO: 0.07 K/UL
BASOPHILS NFR BLD: 1 %
BILIRUB SERPL-MCNC: 1.2 MG/DL
BUN SERPL-MCNC: 10 MG/DL
BUN SERPL-MCNC: 11 MG/DL
CALCIUM SERPL-MCNC: 9.9 MG/DL
CALCIUM SERPL-MCNC: 9.9 MG/DL
CHLORIDE SERPL-SCNC: 102 MMOL/L
CHLORIDE SERPL-SCNC: 104 MMOL/L
CO2 SERPL-SCNC: 22 MMOL/L
CO2 SERPL-SCNC: 25 MMOL/L
CREAT SERPL-MCNC: 0.8 MG/DL
CREAT SERPL-MCNC: 0.8 MG/DL
DIFFERENTIAL METHOD: ABNORMAL
EOSINOPHIL # BLD AUTO: 0.1 K/UL
EOSINOPHIL NFR BLD: 1.1 %
ERYTHROCYTE [DISTWIDTH] IN BLOOD BY AUTOMATED COUNT: 13.5 %
EST. GFR  (AFRICAN AMERICAN): >60 ML/MIN/1.73 M^2
EST. GFR  (AFRICAN AMERICAN): >60 ML/MIN/1.73 M^2
EST. GFR  (NON AFRICAN AMERICAN): >60 ML/MIN/1.73 M^2
EST. GFR  (NON AFRICAN AMERICAN): >60 ML/MIN/1.73 M^2
GLUCOSE SERPL-MCNC: 164 MG/DL
GLUCOSE SERPL-MCNC: 187 MG/DL
HCT VFR BLD AUTO: 31.1 %
HGB BLD-MCNC: 9.9 G/DL
IMM GRANULOCYTES # BLD AUTO: 0.22 K/UL
IMM GRANULOCYTES NFR BLD AUTO: 3.1 %
INR PPP: 1
LYMPHOCYTES # BLD AUTO: 1.2 K/UL
LYMPHOCYTES NFR BLD: 17 %
MAGNESIUM SERPL-MCNC: 1.7 MG/DL
MAGNESIUM SERPL-MCNC: 2.1 MG/DL
MCH RBC QN AUTO: 33.1 PG
MCHC RBC AUTO-ENTMCNC: 31.8 G/DL
MCV RBC AUTO: 104 FL
MONOCYTES # BLD AUTO: 0.8 K/UL
MONOCYTES NFR BLD: 11.4 %
NEUTROPHILS # BLD AUTO: 4.7 K/UL
NEUTROPHILS NFR BLD: 66.4 %
NRBC BLD-RTO: 0 /100 WBC
PLATELET # BLD AUTO: 157 K/UL
PMV BLD AUTO: 10.6 FL
POTASSIUM SERPL-SCNC: 3.6 MMOL/L
POTASSIUM SERPL-SCNC: 4.1 MMOL/L
PROT SERPL-MCNC: 6.6 G/DL
PROTHROMBIN TIME: 10.3 SEC
RBC # BLD AUTO: 2.99 M/UL
SODIUM SERPL-SCNC: 137 MMOL/L
SODIUM SERPL-SCNC: 139 MMOL/L
WBC # BLD AUTO: 7.01 K/UL

## 2019-03-22 PROCEDURE — 85610 PROTHROMBIN TIME: CPT

## 2019-03-22 PROCEDURE — 99291 PR CRITICAL CARE, E/M 30-74 MINUTES: ICD-10-PCS | Mod: ,,, | Performed by: CLINICAL NURSE SPECIALIST

## 2019-03-22 PROCEDURE — 27000221 HC OXYGEN, UP TO 24 HOURS

## 2019-03-22 PROCEDURE — 25000003 PHARM REV CODE 250: Performed by: STUDENT IN AN ORGANIZED HEALTH CARE EDUCATION/TRAINING PROGRAM

## 2019-03-22 PROCEDURE — 92610 EVALUATE SWALLOWING FUNCTION: CPT

## 2019-03-22 PROCEDURE — 99233 PR SUBSEQUENT HOSPITAL CARE,LEVL III: ICD-10-PCS | Mod: ,,, | Performed by: INTERNAL MEDICINE

## 2019-03-22 PROCEDURE — 83735 ASSAY OF MAGNESIUM: CPT

## 2019-03-22 PROCEDURE — 99223 PR INITIAL HOSPITAL CARE,LEVL III: ICD-10-PCS | Mod: ,,, | Performed by: PSYCHIATRY & NEUROLOGY

## 2019-03-22 PROCEDURE — 63600175 PHARM REV CODE 636 W HCPCS: Performed by: INTERNAL MEDICINE

## 2019-03-22 PROCEDURE — A4216 STERILE WATER/SALINE, 10 ML: HCPCS | Performed by: INTERNAL MEDICINE

## 2019-03-22 PROCEDURE — 80053 COMPREHEN METABOLIC PANEL: CPT

## 2019-03-22 PROCEDURE — 94761 N-INVAS EAR/PLS OXIMETRY MLT: CPT

## 2019-03-22 PROCEDURE — 25000003 PHARM REV CODE 250: Performed by: INTERNAL MEDICINE

## 2019-03-22 PROCEDURE — 25000242 PHARM REV CODE 250 ALT 637 W/ HCPCS: Performed by: STUDENT IN AN ORGANIZED HEALTH CARE EDUCATION/TRAINING PROGRAM

## 2019-03-22 PROCEDURE — 25000003 PHARM REV CODE 250: Performed by: CLINICAL NURSE SPECIALIST

## 2019-03-22 PROCEDURE — 63600175 PHARM REV CODE 636 W HCPCS: Performed by: STUDENT IN AN ORGANIZED HEALTH CARE EDUCATION/TRAINING PROGRAM

## 2019-03-22 PROCEDURE — 80048 BASIC METABOLIC PNL TOTAL CA: CPT

## 2019-03-22 PROCEDURE — 83735 ASSAY OF MAGNESIUM: CPT | Mod: 91

## 2019-03-22 PROCEDURE — 20000000 HC ICU ROOM

## 2019-03-22 PROCEDURE — 94640 AIRWAY INHALATION TREATMENT: CPT

## 2019-03-22 PROCEDURE — 85730 THROMBOPLASTIN TIME PARTIAL: CPT

## 2019-03-22 PROCEDURE — 99291 CRITICAL CARE FIRST HOUR: CPT | Mod: ,,, | Performed by: CLINICAL NURSE SPECIALIST

## 2019-03-22 PROCEDURE — 99223 1ST HOSP IP/OBS HIGH 75: CPT | Mod: ,,, | Performed by: PSYCHIATRY & NEUROLOGY

## 2019-03-22 PROCEDURE — 85025 COMPLETE CBC W/AUTO DIFF WBC: CPT

## 2019-03-22 PROCEDURE — 99233 SBSQ HOSP IP/OBS HIGH 50: CPT | Mod: ,,, | Performed by: INTERNAL MEDICINE

## 2019-03-22 PROCEDURE — 84425 ASSAY OF VITAMIN B-1: CPT

## 2019-03-22 PROCEDURE — 63600175 PHARM REV CODE 636 W HCPCS: Performed by: CLINICAL NURSE SPECIALIST

## 2019-03-22 RX ORDER — DEXMEDETOMIDINE HYDROCHLORIDE 4 UG/ML
0.2 INJECTION, SOLUTION INTRAVENOUS CONTINUOUS
Status: DISCONTINUED | OUTPATIENT
Start: 2019-03-22 | End: 2019-03-24

## 2019-03-22 RX ORDER — HYDRALAZINE HYDROCHLORIDE 20 MG/ML
10 INJECTION INTRAMUSCULAR; INTRAVENOUS ONCE
Status: COMPLETED | OUTPATIENT
Start: 2019-03-22 | End: 2019-03-22

## 2019-03-22 RX ORDER — LORAZEPAM 2 MG/ML
2 INJECTION INTRAMUSCULAR EVERY 4 HOURS PRN
Status: DISCONTINUED | OUTPATIENT
Start: 2019-03-22 | End: 2019-03-25

## 2019-03-22 RX ORDER — MICONAZOLE NITRATE 2 %
POWDER (GRAM) TOPICAL 2 TIMES DAILY
Status: DISCONTINUED | OUTPATIENT
Start: 2019-03-22 | End: 2019-03-29 | Stop reason: HOSPADM

## 2019-03-22 RX ORDER — METOPROLOL TARTRATE 1 MG/ML
5 INJECTION, SOLUTION INTRAVENOUS EVERY 6 HOURS
Status: DISCONTINUED | OUTPATIENT
Start: 2019-03-22 | End: 2019-03-23

## 2019-03-22 RX ADMIN — METOPROLOL TARTRATE 5 MG: 5 INJECTION, SOLUTION INTRAVENOUS at 05:03

## 2019-03-22 RX ADMIN — Medication 3 ML: at 11:03

## 2019-03-22 RX ADMIN — METOPROLOL TARTRATE 5 MG: 5 INJECTION, SOLUTION INTRAVENOUS at 12:03

## 2019-03-22 RX ADMIN — LORAZEPAM 2 MG: 2 INJECTION INTRAMUSCULAR; INTRAVENOUS at 12:03

## 2019-03-22 RX ADMIN — HEPARIN SODIUM AND DEXTROSE 25 UNITS/KG/HR: 10000; 5 INJECTION INTRAVENOUS at 06:03

## 2019-03-22 RX ADMIN — CEFEPIME 1 G: 1 INJECTION, POWDER, FOR SOLUTION INTRAMUSCULAR; INTRAVENOUS at 03:03

## 2019-03-22 RX ADMIN — MAGNESIUM SULFATE 2 G: 2 INJECTION INTRAVENOUS at 05:03

## 2019-03-22 RX ADMIN — Medication 1250 MG: at 10:03

## 2019-03-22 RX ADMIN — LORAZEPAM 2 MG: 2 INJECTION INTRAMUSCULAR; INTRAVENOUS at 11:03

## 2019-03-22 RX ADMIN — Medication 3 ML: at 12:03

## 2019-03-22 RX ADMIN — LORAZEPAM 2 MG: 2 INJECTION INTRAMUSCULAR; INTRAVENOUS at 03:03

## 2019-03-22 RX ADMIN — METOPROLOL TARTRATE 5 MG: 5 INJECTION, SOLUTION INTRAVENOUS at 11:03

## 2019-03-22 RX ADMIN — Medication 3 ML: at 02:03

## 2019-03-22 RX ADMIN — POTASSIUM CHLORIDE 40 MEQ: 400 INJECTION, SOLUTION INTRAVENOUS at 05:03

## 2019-03-22 RX ADMIN — DEXMEDETOMIDINE HYDROCHLORIDE 0.6 MCG/KG/HR: 100 INJECTION, SOLUTION, CONCENTRATE INTRAVENOUS at 03:03

## 2019-03-22 RX ADMIN — IPRATROPIUM BROMIDE AND ALBUTEROL SULFATE 3 ML: .5; 3 SOLUTION RESPIRATORY (INHALATION) at 11:03

## 2019-03-22 RX ADMIN — CEFEPIME 1 G: 1 INJECTION, POWDER, FOR SOLUTION INTRAMUSCULAR; INTRAVENOUS at 11:03

## 2019-03-22 RX ADMIN — MICONAZOLE NITRATE: 20 POWDER TOPICAL at 11:03

## 2019-03-22 RX ADMIN — CEFEPIME 1 G: 1 INJECTION, POWDER, FOR SOLUTION INTRAMUSCULAR; INTRAVENOUS at 07:03

## 2019-03-22 RX ADMIN — THIAMINE HYDROCHLORIDE 100 MG: 100 INJECTION, SOLUTION INTRAMUSCULAR; INTRAVENOUS at 11:03

## 2019-03-22 RX ADMIN — HYDRALAZINE HYDROCHLORIDE 10 MG: 20 INJECTION INTRAMUSCULAR; INTRAVENOUS at 11:03

## 2019-03-22 RX ADMIN — HEPARIN SODIUM AND DEXTROSE 25 UNITS/KG/HR: 10000; 5 INJECTION INTRAVENOUS at 07:03

## 2019-03-22 RX ADMIN — DEXMEDETOMIDINE HYDROCHLORIDE 0.8 MCG/KG/HR: 100 INJECTION, SOLUTION, CONCENTRATE INTRAVENOUS at 11:03

## 2019-03-22 RX ADMIN — Medication 1250 MG: at 07:03

## 2019-03-22 NOTE — PLAN OF CARE
Problem: Adult Inpatient Plan of Care  Goal: Plan of Care Review  Outcome: Ongoing (interventions implemented as appropriate)  See vital signs and assessments in flowsheets. See below for updates on today's progress.     Pulmonary: Extubated today, pt on 2L NC. ABG done at 1657.     Cardiovascular: Afib, pt was given Metoprolol and Digoxin per MD orders. Currently in Afib 100s. BP wnl.     Neurological: Oriented to self only, pt is confused and agitated. Pt was given Ativan IV PRN x2. BUE restraints started at 1800. Pt was febrile with Tmax 100.7F with blood cultures, sputum culture and U/A sent.    Gastrointestinal: NPO. Pending speech/swallow eval. Bm x2 today.    Genitourinary: Lynn with adequate urine output.    Endocrine: Bg wnl.    Integumentary/Other: bath completed.    Infusions: Heparin, IV fluids and antibiotics.    Patient progressing towards goals as tolerated, plan of care communicated and reviewed with Donald Warren Abadie and family. All questions and concerns addressed. Will continue to monitor.

## 2019-03-22 NOTE — SUBJECTIVE & OBJECTIVE
Past Medical History:   Diagnosis Date    A-fib     Alcohol abuse     Arthritis     Chronic gout     Diabetes mellitus     DM (diabetes mellitus) 11/16/2016    Fatty liver     Hyperlipidemia     Renal cell cancer 2014       Past Surgical History:   Procedure Laterality Date    ABSCESS DRAINAGE      perirectal    COLONOSCOPY      FISTULOTOMY N/A 5/19/2015    [facility]formed [facility] at [facility] 2ND FLR    HAND SURGERY Left     HEMORRHOIDECTOMY N/A 5/19/2015    Performed [facility] at [facility]ND [facility]LR    KIDNEY SURGERY      partial left kidney removal - [place]    PARTIAL NEPHRECTOMY Left August 2014    ROBOTIC ASSISTED LAPAROSCOPIC NEPHRECTOMY PARTIAL Left 8/28/2014    Performed by Fabian Estevez MD at [facility] OR 2ND FLR       Review of patient's allergies indicates:   Allergen Reactions    No known drug allergies        Family History     Problem Relation (Age of Onset)    Cancer Father    Colon polyps Brother    Diabetes Mother    Lung cancer Father, Sister        Tobacco Use    Smoking status: Never Smoker    Smokeless tobacco: Never Used   Substance and Sexual Activity    Alcohol use: Yes     Alcohol/week: 4.2 oz     Types: 7 Cans of beer per week     Comment: patient states he drinks less than he use to    Drug use: No    Sexual activity: Not on file      Review of Systems   Unable to perform ROS: Mental status change     Objective:     Vital Signs (Most Recent):  Temp: 98.6 °F (37 °C) (03/22/19 1100)  Pulse: 98 (03/22/19 1200)  Resp: (!) 25 (03/22/19 1200)  BP: (!) 147/78 (03/22/19 1200)  SpO2: 100 % (03/22/19 1200) Vital Signs (24h Range):  Temp:  [98.4 °F (36.9 °C)-100.7 °F (38.2 °C)] 98.6 °F (37 °C)  Pulse:  [] 98  Resp:  [23-34] 25  SpO2:  [94 %-100 %] 100 %  BP: (102-165)/() 147/78   Weight: 91.1 kg (200 lb 13.4 oz)  Body mass index is 32.44 kg/m².      Intake/Output Summary (Last 24 hours) at 3/22/2019 1430  Last data filed at 3/22/2019 1300  Gross  per 24 hour   Intake 1761.7 ml   Output 4265 ml   Net -2503.3 ml       Physical Exam   Constitutional: He appears well-developed. He is uncooperative.   Agitated but oriented   HENT:   Head: Normocephalic.   Eyes: Pupils are equal, round, and reactive to light. Right pupil is round. Left pupil is round.   Cardiovascular: Intact distal pulses. Tachycardia present.   Pulmonary/Chest: He has decreased breath sounds in the right lower field and the left lower field. He has rhonchi in the right upper field and the left upper field.   Abdominal: Soft. Normal appearance and bowel sounds are normal. There is no tenderness.   Neurological: He is alert. GCS eye subscore is 4. GCS verbal subscore is 4. GCS motor subscore is 5.   Skin: Skin is warm and dry.   Psychiatric: His affect is labile. His speech is delayed. He is agitated. Cognition and memory are impaired. He expresses impulsivity.   Nursing note and vitals reviewed.       Lines/Drains/Airways     Central Venous Catheter Line                 Percutaneous Central Line Insertion/Assessment - triple lumen  03/17/19 0300 right internal jugular 5 days          Drain                 Urethral Catheter 03/15/19 1206 Double-lumen 16 Fr. 7 days              Significant Labs:    CBC/Anemia Profile:  Recent Labs   Lab 03/21/19  0305 03/22/19  0322   WBC 7.35 7.01   HGB 11.1* 9.9*   HCT 35.3* 31.1*   * 157   * 104*   RDW 13.7 13.5        Chemistries:  Recent Labs   Lab 03/21/19  0305 03/21/19  1315 03/22/19  0322 03/22/19  1220    137 139 137   K 3.6 4.1 3.6 4.1    102 102 104   CO2 24 23 25 22*   BUN 9 10 11 10   CREATININE 0.8 0.9 0.8 0.8   CALCIUM 9.7 9.6 9.9 9.9   ALBUMIN 2.6*  --  2.9*  --    PROT 6.2  --  6.6  --    BILITOT 0.9  --  1.2*  --    ALKPHOS 52*  --  51*  --    ALT 69*  --  62*  --    AST 49*  --  56*  --    MG 1.5* 1.9 1.7 2.1       All pertinent labs within the past 24 hours have been reviewed.    Significant Imaging: I have reviewed  and interpreted all pertinent imaging results/findings within the past 24 hours.

## 2019-03-22 NOTE — PT/OT/SLP PROGRESS
Occupational Therapy      Patient Name:  Donald Warren Abadie   MRN:  [id number]    Patient not seen today secondary to pt not following commands and is agitated/confused. Pt not appropriate for therapy eval this date and OT to check status at later date.     JAZLYN Short  3/22/2019

## 2019-03-22 NOTE — ASSESSMENT & PLAN NOTE
- sever presentation with agitation, hallucinations, and autonomic instability  - patient currently intubated and mechanically ventilated with continuous IV sedation ordered  - continue versed gtt and propofol gtt  - Weaned down versed gtt however, patient did not tolerate. Will increase back to 3 mg/hr and give patient 1-2 more days to recover  -Patient worsened overnight with decrease of propofol. Patient became tachycardic and sweaty and agitated, breathing over the vent. Will increase propofol to previous setting and give patient another day or two on vent. Patient with severe alcohol w/d that will likely take at least 5 days from time of alcohol clearance [3/16 with alc 078 level; 3/17= day 1 no alc).   - Off versed gtt, Ativan prn for withdrawal symptoms  - Continues to have withdrawal symptoms and AMS  - CT head unremarkable and neurology consulted

## 2019-03-22 NOTE — SUBJECTIVE & OBJECTIVE
Interval History: Continues to have AMS. CT head unremarkable. Will transfer to MICU.     Review of Systems   Constitution: Negative for chills and fever.   HENT: Negative for ear discharge.    Eyes: Negative for pain and visual disturbance.   Cardiovascular: Negative for chest pain, dyspnea on exertion, irregular heartbeat, leg swelling, orthopnea, palpitations, paroxysmal nocturnal dyspnea and syncope.   Respiratory: Negative for hemoptysis, shortness of breath and wheezing.    Skin: Negative for rash and suspicious lesions.   Musculoskeletal: Negative for joint pain and muscle weakness.   Gastrointestinal: Negative for abdominal pain, diarrhea, nausea and vomiting.   Genitourinary: Negative for dysuria and frequency.   Neurological: Negative for focal weakness, headaches and tremors.   Psychiatric/Behavioral: Positive for altered mental status.     Objective:     Vital Signs (Most Recent):  Temp: 98.6 °F (37 °C) (03/22/19 1100)  Pulse: 98 (03/22/19 1200)  Resp: (!) 25 (03/22/19 1200)  BP: (!) 147/78 (03/22/19 1200)  SpO2: 100 % (03/22/19 1200) Vital Signs (24h Range):  Temp:  [98.4 °F (36.9 °C)-100.7 °F (38.2 °C)] 98.6 °F (37 °C)  Pulse:  [] 98  Resp:  [23-34] 25  SpO2:  [94 %-100 %] 100 %  BP: (102-165)/() 147/78     Weight: 91.1 kg (200 lb 13.4 oz)  Body mass index is 32.44 kg/m².     SpO2: 100 %  O2 Device (Oxygen Therapy): nasal cannula      Intake/Output Summary (Last 24 hours) at 3/22/2019 1420  Last data filed at 3/22/2019 1300  Gross per 24 hour   Intake 1761.7 ml   Output 4265 ml   Net -2503.3 ml       Lines/Drains/Airways     Central Venous Catheter Line                 Percutaneous Central Line Insertion/Assessment - triple lumen  03/17/19 0300 right internal jugular 5 days          Drain                 Urethral Catheter 03/15/19 1206 Double-lumen 16 Fr. 7 days                Physical Exam   Constitutional: He is oriented to person, place, and time. He appears well-developed and  well-nourished. He is intubated.   HENT:   Head: Normocephalic and atraumatic.   Eyes: EOM are normal.   Cardiovascular: An irregularly irregular rhythm present. Tachycardia present. Exam reveals no gallop and no friction rub.   Pulmonary/Chest: Effort normal and breath sounds normal. No stridor. He is intubated. He has no wheezes. He has no rales.   Abdominal: Soft. Bowel sounds are normal. There is no rebound and no guarding.   Musculoskeletal: He exhibits no edema.   Neurological: He is alert and oriented to person, place, and time. No cranial nerve deficit.   Skin: Skin is warm and dry.   Psychiatric: He has a normal mood and affect. His behavior is normal.   Vitals reviewed.      Significant Labs: All pertinent lab results from the last 24 hours have been reviewed.    Significant Imaging: Reviewed

## 2019-03-22 NOTE — PT/OT/SLP PROGRESS
Physical Therapy      Patient Name:  Donald Warren Abadie   MRN:  [id number]    Patient not seen today. RN hold; pt not following commands and is agitated and confused. Will follow-up when appropriate.      Carolina Fuentes, PT, DPT  3/22/2019  [phone]

## 2019-03-22 NOTE — PT/OT/SLP EVAL
"Speech Language Pathology Evaluation  Bedside Swallow    Patient Name:  Donald Warren Abadie   MRN:  [id number]  Admitting Diagnosis: Encephalopathy    Recommendations:                 General Recommendations:  Dysphagia therapy  Diet recommendations:  NPO, NPO   Aspiration Precautions: Alternate means of nutrition/hydration, Frequent oral care and Strict aspiration precautions   General Precautions: Standard, fall, NPO, aspiration  Communication strategies:  none    History:     Past Medical History:   Diagnosis Date    A-fib     Alcohol abuse     Arthritis     Chronic gout     Diabetes mellitus     DM (diabetes mellitus) 11/16/2016    Fatty liver     Hyperlipidemia     Renal cell cancer 2014       Past Surgical History:   Procedure Laterality Date    ABSCESS DRAINAGE      perirectal    COLONOSCOPY      FISTULOTOMY N/A 5/19/2015    Performed by [facility] at [facility] OR [address] [place] [address]    HAND SURGERY Left     HEMORRHOIDECTOMY N/A 5/19/2015    Performed by [facility] at [facility] OR [address] [place] [address]    KIDNEY SURGERY      partial left kidney removal - [place]    PARTIAL NEPHRECTOMY Left August 2014    ROBOTIC ASSISTED LAPAROSCOPIC NEPHRECTOMY PARTIAL Left 8/28/2014    Performed by Fabian Estevez MD at [facility] OR [address] [place] [address]       Social History: unknown.    Prior Intubation HX:  Pt was intubated 3/15/19 and extubated 3/2/19.    Modified Barium Swallow: none this admission    Chest X-Rays: 3/21/19:  There has been interval removal of endotracheal tube and nasogastric tube.  Right-sided central venous line remains as on previous performed on same date.  There is no pneumothorax.  The remainder of the examination is unchanged.    Prior diet: unknown    Occupation/hobbies/homemaking: none expressed.    Subjective     "Hey, so how does this go?"  Pt asked SLP re: loosened restraint ties  Patient goals: none expressed     Pain/Comfort:  · Pain Rating 1: 0/10  · Pain Rating Post-Intervention 1: 0/10    Objective: "     Oral Musculature Evaluation  · Oral Musculature: unable to assess due to poor participation/comprehension  · Dentition: present and adequate  · Secretion Management: adequate  · Mucosal Quality: adequate  · Mandibular Strength and Mobility: WFL  · Volitional Cough: adequate  · Volitional Swallow: present, delayed  · Voice Prior to PO Intake: clear    Bedside Swallow Eval:   Consistencies Assessed:  · Thin liquids small ice chips x2, tsp sip x1     Oral Phase:   · decreased attention to task  · Slow oral transit time    Pharyngeal Phase:   · Coughing/choking:  delayed  · decreased hyolaryngeal excursion to palpation  · delayed swallow initation    Compensatory Strategies  · None    Treatment: Education was provided to pt re: SLP role, eval results, need for npo status, and SLP POC.  He indicated no understanding.    Assessment:     Donald Warren Abadie is a 63 y.o. male with an SLP diagnosis of Dysphagia.  He presents with severe oropharyngeal dysphagia at this time.    Goals:   Multidisciplinary Problems     SLP Goals        Problem: SLP Goal    Goal Priority Disciplines Outcome   SLP Goal     SLP Ongoing (interventions implemented as appropriate)   Description:  Speech Language Pathology Goals  Goals expected to be met by 3/28/19    1.  Pt will participate in ongoing swallow evaluation to determine readiness to resume po intake.                        Plan:     · Patient to be seen:  4 x/week   · Plan of Care expires:  04/20/19  · Plan of Care reviewed with:  patient   · SLP Follow-Up:  Yes       Discharge recommendations:  other (see comments)(pending pt/ot recs)   Barriers to Discharge:  Inaccessible Home Environment    Time Tracking:     SLP Treatment Date:   03/22/19  Speech Start Time:  1111  Speech Stop Time:  1119     Speech Total Time (min):  8 min    Billable Minutes: Eval Swallow and Oral Function 8    Emma Suazo CCC-SLP  03/22/2019

## 2019-03-22 NOTE — HPI
Mr Abadie is a 64 y/o male with PMH of left sided RCC s/p partial nephrectomy (2014), paroxysmal AFib, EtOH abuse (PSYCH offered rehabilitation but declined), HTN, HLD, and fatty liver who presented to [facility] ED on 3/15 with fatigue/malaise. There was concern that he had taken an accidental overdose of BB and flecainide because he felt that he was having tachycardia and reported taking additional doses of his prescribed metoprolol several times in the preceding days. He had also being drinking and his daughter reports hallucinations before ED admission. In ED, the EKG showed severe bradycardia. Reportedly, the patient displayed symptoms of EtOH withdrawal including anxiety, diaphoresis, agitation and hallucinations. There was concerned about a seizure prior to the patient going into into cardiac arrest.  A TVP was placed for HR in the 40s, post-ROSC. Etiology of arrest unclear but felt to be likely severe bradycardia given BB overdose.The patient remained intubated until 3/21 and was on the Cardiology service until 3/22 when MICU was consulted to assume care. During this week of admission, he was treated for alcohol withdrawals and on propofol and Versed during that time and also given a librium taper. He intermittently experience Afib/flutter and has been on a heparin infusion. Additionally, he had intermittent fevers, unclear source but was given intermittent Cefepime, Vancomycin until this was also discontinued with cultures negative. Since extubation, the patient has experienced altered mental status of unclear etiology. He never had any focal deficits, and is oriented to place and person.    The MICU assumed care on 3/22.

## 2019-03-22 NOTE — RESIDENT HANDOFF
Handoff     Primary Team: Networked reference to record [facility]  Room Number: 6092/6092 A     Patient Name: Donald Warren Abadie MRN: [id number]     Date of Birth: [id number] Allergies: No known drug allergies     Age: 63 y.o. Admit Date: 3/15/2019     Sex: male  BMI: Body mass index is 32.44 kg/m².     Code Status: Full Code        Illness Level (current clinical status): Watcher - No    Reason for Admission: Encephalopathy    Brief HPI : 64 y/o male with PMH of paroxysmal AF, EtOH abuse (PSYCH offered rehabilitation but declined), HTN, HLD, and elevated LFTs who presents with fatigue/malaise. Home meds include Lopressor 25 mg PO BID and diltiazem 120 mg qD with diltiazem started 3/4. The patient is on flecainide 150 mg PO BID. He took several doses of breakthrough metoprolol tartrate 25 mg (he thinks three pills in the last 6 hours) on top of his long acting diltiazem and flecainide. The patient also took several additional doses of BB and flecainide over the last three days, according to his history, because he felt that he was having tachycardia. His daughter accompanies him and notes that her father drank two beers yesterday morning. He felt withdrawal with hallucinations yesterday evening, so he consumed seven beers last night. She says he had at least one hallucination before ED admission.             Hospital Course : Patient coded in ED, subsequently ROSC achieved and intubated. TVP placed in sterile fashion via the right IJ vein post-ROSC. Placed on versed gtt and propofol gtt. Vancomycin and cefepime started as concern for septic shock. TVP pulled on 3/16. 3/20 EKG shows A-flutter and Digoxin started. Overnight, converted to A-fib and metoprolol started. Extubated on 3/21. Has had AMS without focal neurological defects. CT head unremarkable and neurology consulted.        Tasks (specific, using if-then statements): Treat alcohol withdrawal with benzo taper and follow-up neurology recs. Consider MRI head if no  improvement in AMS. Discontinue Abx after 48 hours if blood cx NGTD.       Estimated Discharge Date: unknown    Discharge Disposition: Home or Self Care    Mentored By: Dr. Marie

## 2019-03-22 NOTE — HPI
"Patient is a 64 yo male w/ past medical history significant for paroxysmal AF(on Eliquis), EtOH abuse (PSYCH offered rehabilitation but declined, reports 12 cases of beer /week), HTN, HLD, and elevated LFTs who presented to the ED w/ complaints of fatigue and malaise on 3/15. Reportedly he was attempting to cut back on his EtOH intake and as he was self uptitrating his heart medication to combat the above mentioned complaints ( BB, flecainide for feeling of his "heart racing"). Patient reported withdrawal symptoms prior to his admission w/ hallucinations and confusion. Last alcoholic drink on 3/14 (7 beers).  Patient received atropine in the ED for bradycardia of 20, but response was brief and he required TVP. Patient coded in ED, subsequently ROSC achieved and intubated. The patient developed worsening hallucinations and agitation, per nursing report obtained after ROSC, and was combative. Per report after ROSC, his daughter, who is a nurse, was concerned he had a seizure before the patient went into cardiac arrest.  Placed on versed gtt and propofol gtt. Vancomycin and cefepime started as concern for septic shock. TVP pulled on 3/16.   Patient was extubated on 3/22 and primary team consulted neurology for "AMS, presented with alcohol withdrawal, ordered CT head".       "

## 2019-03-22 NOTE — PLAN OF CARE
Problem: Adult Inpatient Plan of Care  Goal: Plan of Care Review  Outcome: Ongoing (interventions implemented as appropriate)  No acute events throughout day, VS and assessment per flow sheet. Patient continues to be confused. Precedex started and PRN ativan used to help calm patient. Head CT obtained and MRI ordered. Patient to remain NPO today due to AMS and increased risk of aspiration. Patient transferred from cardiology to critical care services. Plan of care reviewed with Donald Warren Abadie and family, all concerns addressed, will continue to monitor.

## 2019-03-22 NOTE — H&P
[facility]  Critical Care Medicine  History & Physical    Patient Name: Donald Warren Abadie  MRN: [id number]  Admission Date: 3/15/2019  Hospital Length of Stay: 7 days  Code Status: Full Code  Attending Physician: Bony Cartagena*   Primary Care Provider: Bacilio Larry MD   Principal Problem: Encephalopathy    Subjective:     HPI:  Mr Abadie is a 64 y/o male with PMH of paroxysmal AF, EtOH abuse (PSYCH offered rehabilitation but declined), HTN, HLD, and elevated LFTs who presented to [facility] on 3/15 with fatigue/malaise. There was concern that he had taken an accidental overdose of BB and flecainide because he felt that he was having tachycardia. He had also being drinking and his daughter reports hallucinations before ED admission. In ED, the EKG showed severe bradycardia. Reportedly, the patient displayed symptoms of EtOH withdrawal including anxiety, diaphoresis, agitation and hallucinations. There was concerned about a seizure prior to the patient going into into cardiac arrest.  A TVP was placed for HR in the 40s, post-ROSC. The patient remained intubated until 3/21. He was placed on Propofol and Versed during that time. He intermittently experience Afib/flutter and has been on a heparin infusion. He has been treated for alcohol withdrawal with a Librium taper. Since extubation, the patient has experienced altered mental status, does not have any focal deficits, and is oriented to place and person.  The MICU service has been asked to assist with clinical management.           Hospital/ICU Course:  No notes on file     Past Medical History:   Diagnosis Date    A-fib     Alcohol abuse     Arthritis     Chronic gout     Diabetes mellitus     DM (diabetes mellitus) 11/16/2016    Fatty liver     Hyperlipidemia     Renal cell cancer 2014       Past Surgical History:   Procedure Laterality Date    ABSCESS DRAINAGE      perirectal    COLONOSCOPY      FISTULOTOMY N/A 5/19/2015     [facility]    HAND SURGERY Left     HEMORRHOIDECTOMY N/A 5/19/2015    [facility]    KIDNEY SURGERY      partial left kidney removal - [place]    PARTIAL NEPHRECTOMY Left August 2014    ROBOTIC ASSISTED LAPAROSCOPIC NEPHRECTOMY PARTIAL Left 8/28/2014    Performed by Fabian Estevez MD at [facility] OR [address] [place] [address]       Review of patient's allergies indicates:   Allergen Reactions    No known drug allergies        Family History     Problem Relation (Age of Onset)    Cancer Father    Colon polyps Brother    Diabetes Mother    Lung cancer Father, Sister        Tobacco Use    Smoking status: Never Smoker    Smokeless tobacco: Never Used   Substance and Sexual Activity    Alcohol use: Yes     Alcohol/week: 4.2 oz     Types: 7 Cans of beer per week     Comment: patient states he drinks less than he use to    Drug use: No    Sexual activity: Not on file      Review of Systems   Unable to perform ROS: Mental status change     Objective:     Vital Signs (Most Recent):  Temp: 98.6 °F (37 °C) (03/22/19 1100)  Pulse: 98 (03/22/19 1200)  Resp: (!) 25 (03/22/19 1200)  BP: (!) 147/78 (03/22/19 1200)  SpO2: 100 % (03/22/19 1200) Vital Signs (24h Range):  Temp:  [98.4 °F (36.9 °C)-100.7 °F (38.2 °C)] 98.6 °F (37 °C)  Pulse:  [] 98  Resp:  [23-34] 25  SpO2:  [94 %-100 %] 100 %  BP: (102-165)/() 147/78   Weight: 91.1 kg (200 lb 13.4 oz)  Body mass index is 32.44 kg/m².      Intake/Output Summary (Last 24 hours) at 3/22/2019 1430  Last data filed at 3/22/2019 1300  Gross per 24 hour   Intake 1761.7 ml   Output 4265 ml   Net -2503.3 ml       Physical Exam   Constitutional: He appears well-developed. He is uncooperative.   Agitated but oriented   HENT:   Head: Normocephalic.   Eyes: Pupils are equal, round, and reactive to light. Right pupil is round. Left pupil is round.   Cardiovascular: Intact distal pulses. Tachycardia present.    Pulmonary/Chest: He has decreased breath sounds in the right lower field and the left lower field. He has rhonchi in the right upper field and the left upper field.   Abdominal: Soft. Normal appearance and bowel sounds are normal. There is no tenderness.   Neurological: He is alert. GCS eye subscore is 4. GCS verbal subscore is 4. GCS motor subscore is 5.   Skin: Skin is warm and dry.   Psychiatric: His affect is labile. His speech is delayed. He is agitated. Cognition and memory are impaired. He expresses impulsivity.   Nursing note and vitals reviewed.       Lines/Drains/Airways     Central Venous Catheter Line                 Percutaneous Central Line Insertion/Assessment - triple lumen  03/17/19 0300 right internal jugular 5 days          Drain                 Urethral Catheter 03/15/19 1206 Double-lumen 16 Fr. 7 days              Significant Labs:    CBC/Anemia Profile:  Recent Labs   Lab 03/21/19  0305 03/22/19  0322   WBC 7.35 7.01   HGB 11.1* 9.9*   HCT 35.3* 31.1*   * 157   * 104*   RDW 13.7 13.5        Chemistries:  Recent Labs   Lab 03/21/19  0305 03/21/19  1315 03/22/19  0322 03/22/19  1220    137 139 137   K 3.6 4.1 3.6 4.1    102 102 104   CO2 24 23 25 22*   BUN 9 10 11 10   CREATININE 0.8 0.9 0.8 0.8   CALCIUM 9.7 9.6 9.9 9.9   ALBUMIN 2.6*  --  2.9*  --    PROT 6.2  --  6.6  --    BILITOT 0.9  --  1.2*  --    ALKPHOS 52*  --  51*  --    ALT 69*  --  62*  --    AST 49*  --  56*  --    MG 1.5* 1.9 1.7 2.1       All pertinent labs within the past 24 hours have been reviewed.    Significant Imaging: I have reviewed and interpreted all pertinent imaging results/findings within the past 24 hours.    Assessment/Plan:     Neuro   * Encephalopathy    Patient with AMS, likely metabolic encephalopathy or delirium  - Delirium precautions  - EEG on admission negative  - CT on admission and on 3/22 shows no acute process  - MRI ordered     Psychiatric   Alcoholism /alcohol abuse    --  Check CIWA-AR  -- Precedex infusion  -- Prn Ativan       Alcohol withdrawal    Placed on Precedex for now  - On Propofol and Versed while intubated         Cardiac/Vascular   Cardiac arrest    Unclear etiology  - possibly due to BB overdose.  - Glucagon given in ED     Paroxysmal atrial fibrillation    holding BB, CCB and flecainide at this time  - in SR  - prn metoprolol  - heparin drip     Endocrine   Diabetes mellitus without complication    - Accucheck prn       GI   Elevated LFTs    Continue to trend  -- CMP and hepatic panel daily          Critical Care Time: 45 minutes    Critical care was time spent personally by me on the following activities: development of treatment plan with patient or surrogate and bedside caregivers, discussions with consultants, evaluation of patient's response to treatment, examination of patient, ordering and performing treatments and interventions, ordering and review of laboratory studies, ordering and review of radiographic studies, pulse oximetry, re-evaluation of patient's condition. This critical care time did not overlap with that of any other provider or involve time for any procedures.     Fiona Winterbottom, APRN, CNS  Critical Care Medicine  [facility]

## 2019-03-22 NOTE — PLAN OF CARE
Problem: SLP Goal  Goal: SLP Goal  Speech Language Pathology Goals  Goals expected to be met by 3/28/19    1.  Pt will participate in ongoing swallow evaluation to determine readiness to resume po intake.      Outcome: Ongoing (interventions implemented as appropriate)  Clinical Swallow Evaluation completed.  REC:  Pt remain npo at this time w/ consideration for temporary alternative means of nutrition/hydration if needed.  Recs reviewed w/ RN.  Cont ST per POC.      Emma Suazo CCC-SLP  3/22/2019

## 2019-03-22 NOTE — CONSULTS
"[facility]  Neurology  Consult Note    Patient Name: Donald Warren Abadie  MRN: [id number]  Admission Date: 3/15/2019  Hospital Length of Stay: 7 days  Code Status: Full Code   Attending Provider: Sarthak Marie MD   Consulting Provider: Lina Hayden MD  Primary Care Physician: Bacilio Larry MD  Principal Problem:Encephalopathy    Inpatient consult to Neurology  Consult performed by: Lina Hayden MD  Consult ordered by: Quique Gonzalez MD         Subjective:     Chief Complaint: Encephalopathy      HPI:   Patient is a 62 yo male w/ past medical history significant for paroxysmal AF(on Eliquis), EtOH abuse ([facility] offered rehabilitation but declined, reports 12 cases of beer /week), HTN, HLD, and elevated LFTs who presented to the ED w/ complaints of fatigue and malaise on 3/15. Reportedly he was attempting to cut back on his EtOH intake and as he was self uptitrating his heart medication to combat the above mentioned complaints ( BB, flecainide for feeling of his "heart racing"). Patient reported withdrawal symptoms prior to his admission w/ hallucinations and confusion. Last alcoholic drink on 3/14 (7 beers).  Patient received atropine in the ED for bradycardia of 20, but response was brief and he required TVP. Patient coded in ED, subsequently ROSC achieved and intubated. The patient developed worsening hallucinations and agitation, per nursing report obtained after ROSC, and was combative. Per report after ROSC, his daughter, who is a nurse, was concerned he had a seizure before the patient went into cardiac arrest.  Placed on versed gtt and propofol gtt. Vancomycin and cefepime started as concern for septic shock. TVP pulled on 3/16.   Patient was extubated on 3/22 and primary team consulted neurology for "AMS, presented with alcohol withdrawal, ordered CT head".          Past Medical History:   Diagnosis Date    A-fib     Alcohol abuse     Arthritis     Chronic gout     Diabetes " mellitus     DM (diabetes mellitus) 11/16/2016    Fatty liver     Hyperlipidemia     Renal cell cancer 2014       Past Surgical History:   Procedure Laterality Date    ABSCESS DRAINAGE      perirectal    COLONOSCOPY      FISTULOTOMY N/A 5/19/2015    Performed by [facility] at [facility] OR [address] [place] [address]    HAND SURGERY Left     HEMORRHOIDECTOMY N/A 5/19/2015    Performed [facility] at [facility] OR [facility]    KIDNEY SURGERY      partial left kidney removal - [place]    PARTIAL NEPHRECTOMY Left August 2014    ROBOTIC ASSISTED LAPAROSCOPIC NEPHRECTOMY PARTIAL Left 8/28/2014    Performed by Fabian Estevez MD at [facility] OR [address] [place] [address]       Review of patient's allergies indicates:   Allergen Reactions    No known drug allergies        No current facility-administered medications on file prior to encounter.      Current Outpatient Medications on File Prior to Encounter   Medication Sig    allopurinol (ZYLOPRIM) 100 MG tablet TAKE 2 TABLETS BY MOUTH DAILY    apixaban (ELIQUIS) 5 mg Tab Take 1 tablet (5 mg total) by mouth 2 (two) times daily.    diltiaZEM (DILACOR XR) 120 MG CDCR Take 1 capsule (120 mg total) by mouth once daily.    fenofibrate 160 MG Tab Take 1 tablet (160 mg total) by mouth every evening.    flecainide (TAMBOCOR) 150 MG Tab Take 1 tablet (150 mg total) by mouth 2 (two) times daily.    LORazepam (ATIVAN) 0.5 MG tablet TAKE 1 TABLET BY MOUTH EVERY 12 HOURS AS NEEDED FOR ANXIETY    metoprolol tartrate (LOPRESSOR) 25 MG tablet Take 1 tablet (25 mg total) by mouth 2 (two) times daily as needed.    omega-3 acid ethyl esters (LOVAZA) 1 gram capsule Take 2 g by mouth 2 (two) times daily.    ranitidine (ZANTAC) 150 MG capsule Take 150 mg by mouth 2 (two) times daily.    rosuvastatin (CRESTOR) 10 MG tablet Take 1 tablet (10 mg total) by mouth once daily.    aspirin 81 MG Chew Take 1 tablet (81 mg total) by mouth once daily.     Family History     Problem Relation (Age of Onset)    Cancer  Father    Colon polyps Brother    Diabetes Mother    Lung cancer Father, Sister        Tobacco Use    Smoking status: Never Smoker    Smokeless tobacco: Never Used   Substance and Sexual Activity    Alcohol use: Yes     Alcohol/week: 4.2 oz     Types: 7 Cans of beer per week     Comment: patient states he drinks less than he use to    Drug use: No    Sexual activity: Not on file     Review of Systems   Constitutional: Positive for diaphoresis, fatigue and fever.   Eyes: Positive for visual disturbance.   Respiratory: Positive for shortness of breath.    Cardiovascular: Positive for palpitations.   Gastrointestinal: Negative for nausea and vomiting.   Genitourinary: Negative.    Musculoskeletal: Negative.    Skin: Negative.    Allergic/Immunologic: Negative.    Neurological: Positive for tremors and weakness.   Psychiatric/Behavioral: Positive for confusion, decreased concentration, hallucinations and sleep disturbance.     Objective:     Vital Signs (Most Recent):  Temp: 99.1 °F (37.3 °C) (03/22/19 0701)  Pulse: 79 (03/22/19 1000)  Resp: (!) 28 (03/22/19 1000)  BP: 137/74 (03/22/19 1000)  SpO2: 100 % (03/22/19 1000) Vital Signs (24h Range):  Temp:  [98.4 °F (36.9 °C)-100.7 °F (38.2 °C)] 99.1 °F (37.3 °C)  Pulse:  [] 79  Resp:  [23-34] 28  SpO2:  [93 %-100 %] 100 %  BP: (102-165)/() 137/74     Weight: 91.1 kg (200 lb 13.4 oz)  Body mass index is 32.44 kg/m².    Physical Exam  General:  Well-developed, well-nourished, middle aged male, resting in bed  HEENT:  NCAT, PERRLA, EOMI, oropharyngeal membranes non-erythematous, some lingual tremors noted  Neck:  Supple, normal ROM without nuchal rigidity  Resp:  Symmetric expansion, wheezing noted b/l  CVS:  No LE edema, peripheral pulses 2+ (radial, dorsalis pedis)  GI:  Abd soft, distended, non-tender to palpation  Neurologic Exam:  Mental Status:  AAO to self, hospital. Thinks its 1990, states he is 61 yo. Able to recognize son-in-law and state his name  after several wrong attempts. States that he is here for his back issues. Does accurately report his alcohol intake and last day he drank an alcoholic beverage.  Speech somewhat tangential at times. Able to follow commands consistently, especially w/ encouragement.    Cranial Nerves:  VFs intact on counting fingers in all quadrants bilaterally.  PERRLA, EOMI.  Facial movement, sensation intact and symmetric.  Palate raises symmetrically, tongue protrudes midline.  Trapezius, SCM strength 5/5 bilaterally.  Motor:  Normal bulk and tone.  BUE shoulder abduction 5/5, biceps/triceps 4/5,   strength 4+/5.  BLE hip flexors, knee flexion/extension, plantarflexion/dorsiflexion 5/5.  Sensory:  Intact to light touch at all extremities and face without inattention.    Reflexes:  Biceps, brachioradialis 2+ and symmetric, patellar, Achilles 1+ and symmetric.  No ankle clonus.  Downgoing toe bilaterally.  Coordination:  No resting tremor. Some action tremulousness noted. Patient difficulty w/ following commands to complete test accurately.   Gait: Deferred          Significant Labs:   Hemoglobin A1c: No results for input(s): HGBA1C in the last 720 hours.  Blood Culture:   Recent Labs   Lab 03/21/19  1710 03/21/19  1715   LABBLOO No Growth to date No Growth to date     BMP:   Recent Labs   Lab 03/21/19 0305 03/21/19  1315 03/22/19  0322   * 169* 187*    137 139   K 3.6 4.1 3.6    102 102   CO2 24 23 25   BUN 9 10 11   CREATININE 0.8 0.9 0.8   CALCIUM 9.7 9.6 9.9   MG 1.5* 1.9 1.7     CBC:   Recent Labs   Lab 03/21/19 0305 03/22/19  0322   WBC 7.35 7.01   HGB 11.1* 9.9*   HCT 35.3* 31.1*   * 157     CMP:   Recent Labs   Lab 03/21/19 0305 03/21/19  1315 03/22/19  0322   * 169* 187*    137 139   K 3.6 4.1 3.6    102 102   CO2 24 23 25   BUN 9 10 11   CREATININE 0.8 0.9 0.8   CALCIUM 9.7 9.6 9.9   MG 1.5* 1.9 1.7   PROT 6.2  --  6.6   ALBUMIN 2.6*  --  2.9*   BILITOT 0.9  --  1.2*    ALKPHOS 52*  --  51*   AST 49*  --  56*   ALT 69*  --  62*   ANIONGAP 10 12 12   EGFRNONAA >60.0 >60.0 >60.0     CSF Culture: No results for input(s): CSFCULTURE in the last 48 hours.  CSF Studies: No results for input(s): ALIQUT, APPEARCSF, COLORCSF, CSFWBC, CSFRBC, GLUCCSF, LDHCSF, PROTEINCSF, VDRLCSF in the last 48 hours.  Inflammatory Markers: No results for input(s): SEDRATE, CRP, PROCAL in the last 48 hours.  POCT Glucose: No results for input(s): POCTGLUCOSE in the last 24 hours.  Prealbumin: No results for input(s): PREALBUMIN in the last 48 hours.  Respiratory Culture:   Recent Labs   Lab 03/21/19  1230   GSRESP Rare WBC's  No organisms seen   RESPIRATORYC No Growth     Urine Culture: No results for input(s): LABURIN in the last 48 hours.  Urine Studies:   Recent Labs   Lab 03/21/19  1840   COLORU Yellow   APPEARANCEUA Clear   PHUR 6.0   SPECGRAV 1.010   PROTEINUA Negative   GLUCUA 3+*   KETONESU Negative   BILIRUBINUA Negative   OCCULTUA 1+*   NITRITE Negative   LEUKOCYTESUR Negative   RBCUA 5*   WBCUA 1   BACTERIA None     All pertinent lab results from the past 24 hours have been reviewed.    Significant Imaging: I have reviewed all pertinent imaging results/findings within the past 24 hours.   CTH  There is generalized cerebral volume loss with compensatory enlargement ventricles sulci and cisterns without hydrocephalus.  There is no evidence for acute intracranial hemorrhage or sulcal effacement to suggest large territory recent infarction.  Study is limited by patient motion.  Lobular opacity right maxillary antra suggestive for mucous retention cyst with mild probable left maxillary mucosal thickening.    Assessment and Plan:     * Encephalopathy    Patient is a 64 yo male w/ past medical history as mentioned above who presented to the ED on 3/15 w/ symptoms of EtOH withdrawal as well as BB overdose. Patient w/ suspected seizure in the ED (as per daughter), and presumably after that a code blue  was called and patient did achieve ROSC w/ ACLS. He was intubated, sedated on propofol as well as placed on Versed drip. Patient extubated on 03/21 and both propofol and versed were stopped on 3/21 at 15:00.  Neurology consulted for persistent confusion.   At this time, believe that this confusion is multifactorial. Patient w/ likely component of ICU delirium, EtOH withdrawal as well as insomnia. His exam is non-focal in terms of motor/sensory deficits and his only present issue is cognitive dysfunction. CTH unremarkable. He does appear tremulous, diaphoretic and although he does have elevated HR (although w/ Hx of Afib) and at times, elevated BP.     - Would recommend Thiamine replacement daily ( 100 mg PO) indefinitely for this gentleman   - Patient would benefit from Delirium precautions   - CIWA protocol should be followed strictly, and he could also benefit from scheduled benzodiazapine , w/ PRN doses for breakthrough symptoms   - If require further recommendations, please contact our Psychiatry colleges for their recommendations   - Patient and family report insomnia   - Could consider small doses of Depakote vs Seroquel QHS PRN   - Daily EKG to monitor QTc if Seroquel chosen   - Patient w/ 100.7 Tmax in past 24 hours as well as b/l wheezing noted on exam   - Scheduled breathing treatments recommended for  - Very low suspicion for primary neurological issues causing patients deficits, especially as he is improving even this AM.  - Appreciate consult     Alcohol withdrawal    - See principal problem          VTE Risk Mitigation (From admission, onward)        Ordered     heparin 25,000 units in dextrose 5% 250 mL (100 units/mL) infusion LOW INTENSITY nomogram - [facility]  Continuous      03/15/19 1036     heparin 25,000 units in dextrose 5% (100 units/ml) IV bolus from bag - ADDITIONAL PRN BOLUS - 60 units/kg  As needed (PRN)      03/15/19 1036     heparin 25,000 units in dextrose 5% (100 units/ml) IV bolus from bag  - ADDITIONAL PRN BOLUS - 30 units/kg  As needed (PRN)      03/15/19 1036     IP VTE HIGH RISK PATIENT  Once      03/15/19 0847          Thank you for your consult. I will follow-up with patient. Please contact us if you have any additional questions.    Lina Haydne MD  Neurology  [facility]

## 2019-03-22 NOTE — PROGRESS NOTES
[facility]  Cardiology  Progress Note    Patient Name: Donald Warren Abadie  MRN: [id number]  Admission Date: 3/15/2019  Hospital Length of Stay: 7 days  Code Status: Full Code   Attending Physician: Sarthak Marie MD   Primary Care Physician: Bacilio Larry MD  Expected Discharge Date: 3/25/2019  Principal Problem:Encephalopathy    Subjective:     Hospital Course:   Patient coded in ED, subsequently ROSC achieved and intubated. TVP placed in sterile fashion via the right IJ vein post-ROSC. Placed on versed gtt and propofol gtt. Vancomycin and cefepime started as concern for septic shock. TVP pulled on 3/16. 3/20 EKG shows A-flutter and Digoxin started. Overnight, converted to A-fib and metoprolol started. Extubated on 3/21. Has had AMS without focal neurological defects. CT head unremarkable and neurology consulted.         Interval History: Continues to have AMS. CT head unremarkable. Will transfer to MICU.     Review of Systems   Constitution: Negative for chills and fever.   HENT: Negative for ear discharge.    Eyes: Negative for pain and visual disturbance.   Cardiovascular: Negative for chest pain, dyspnea on exertion, irregular heartbeat, leg swelling, orthopnea, palpitations, paroxysmal nocturnal dyspnea and syncope.   Respiratory: Negative for hemoptysis, shortness of breath and wheezing.    Skin: Negative for rash and suspicious lesions.   Musculoskeletal: Negative for joint pain and muscle weakness.   Gastrointestinal: Negative for abdominal pain, diarrhea, nausea and vomiting.   Genitourinary: Negative for dysuria and frequency.   Neurological: Negative for focal weakness, headaches and tremors.   Psychiatric/Behavioral: Positive for altered mental status.     Objective:     Vital Signs (Most Recent):  Temp: 98.6 °F (37 °C) (03/22/19 1100)  Pulse: 98 (03/22/19 1200)  Resp: (!) 25 (03/22/19 1200)  BP: (!) 147/78 (03/22/19 1200)  SpO2: 100 % (03/22/19 1200) Vital Signs (24h  Range):  Temp:  [98.4 °F (36.9 °C)-100.7 °F (38.2 °C)] 98.6 °F (37 °C)  Pulse:  [] 98  Resp:  [23-34] 25  SpO2:  [94 %-100 %] 100 %  BP: (102-165)/() 147/78     Weight: 91.1 kg (200 lb 13.4 oz)  Body mass index is 32.44 kg/m².     SpO2: 100 %  O2 Device (Oxygen Therapy): nasal cannula      Intake/Output Summary (Last 24 hours) at 3/22/2019 1420  Last data filed at 3/22/2019 1300  Gross per 24 hour   Intake 1761.7 ml   Output 4265 ml   Net -2503.3 ml       Lines/Drains/Airways     Central Venous Catheter Line                 Percutaneous Central Line Insertion/Assessment - triple lumen  03/17/19 0300 right internal jugular 5 days          Drain                 Urethral Catheter 03/15/19 1206 Double-lumen 16 Fr. 7 days                Physical Exam   Constitutional: He is oriented to person, place, and time. He appears well-developed and well-nourished. He is intubated.   HENT:   Head: Normocephalic and atraumatic.   Eyes: EOM are normal.   Cardiovascular: An irregularly irregular rhythm present. Tachycardia present. Exam reveals no gallop and no friction rub.   Pulmonary/Chest: Effort normal and breath sounds normal. No stridor. He is intubated. He has no wheezes. He has no rales.   Abdominal: Soft. Bowel sounds are normal. There is no rebound and no guarding.   Musculoskeletal: He exhibits no edema.   Neurological: He is alert and oriented to person, place, and time. No cranial nerve deficit.   Skin: Skin is warm and dry.   Psychiatric: He has a normal mood and affect. His behavior is normal.   Vitals reviewed.      Significant Labs: All pertinent lab results from the last 24 hours have been reviewed.    Significant Imaging: Reviewed    Assessment and Plan:         Fever    - Vancomycin and Cefepime started for fever  - Blood cx NGTD  - UA unremarkable  - Discontinue Abx after 48 hours if blood cx NGTD     Cardiac arrest    - obtain TTE with CFD  - cover with broad spectrum abx for now (leukocytosis  post-ROSC)  - maintain K>4 and Mg>2  - obtain EEG   - treat BB poisoning     Lactic acidosis    - AGAP present prior to cardiac arrest  - lactic acid after arrest is 8.1  - ED has administered more isotonic fluid  - optimize cardiac profile  - obtain BCx x2, UA with reflex UCx  - administer broad spectrum antibiotics  - Blood cx NGTD  - Vancomycin and cefepime deescalated to Ceftriaxone   - Abx discontinued     LA 0.7  IMPROVED        Hyponatremia    - not explained by hyperglycemia and not on diuretics  - in the setting of JASS; ED administered isotonic fluid  - continue to monitor  - Improving with IV fluid administration    Na 134-135  RESOLVED        Diabetes mellitus without complication    - now off glucagon drip  - SSI     Paroxysmal atrial fibrillation    - hold BB, CCB and flecainide in the setting of bradycardia  - held NOAC for several days pending liver bx  - IV UFH in lieu of NOAC   - Converted from a-flutter to a-fib overnight and metoprolol started.       Elevated LFTs    - consult hepatology with concern for alcoholic hepatitis  - scheduled for liver biopsy this month  - hold statin and fenofibrate  - optimize cardiac profile     Alcohol withdrawal    - sever presentation with agitation, hallucinations, and autonomic instability  - patient currently intubated and mechanically ventilated with continuous IV sedation ordered  - continue versed gtt and propofol gtt  - Weaned down versed gtt however, patient did not tolerate. Will increase back to 3 mg/hr and give patient 1-2 more days to recover  -Patient worsened overnight with decrease of propofol. Patient became tachycardic and sweaty and agitated, breathing over the vent. Will increase propofol to previous setting and give patient another day or two on vent. Patient with severe alcohol w/d that will likely take at least 5 days from time of alcohol clearance [3/16 with alc 078 level; 3/17= day 1 no alc).   - Off versed gtt, Ativan prn for withdrawal  symptoms  - Continues to have withdrawal symptoms and AMS  - CT head unremarkable and neurology consulted       Hypertriglyceridemia    - hold statin         VTE Risk Mitigation (From admission, onward)        Ordered     heparin 25,000 units in dextrose 5% 250 mL (100 units/mL) infusion LOW INTENSITY nomogram - [facility]  Continuous      03/15/19 1036     heparin 25,000 units in dextrose 5% (100 units/ml) IV bolus from bag - ADDITIONAL PRN BOLUS - 60 units/kg  As needed (PRN)      03/15/19 1036     heparin 25,000 units in dextrose 5% (100 units/ml) IV bolus from bag - ADDITIONAL PRN BOLUS - 30 units/kg  As needed (PRN)      03/15/19 1036     IP VTE HIGH RISK PATIENT  Once      03/15/19 0847          Quique Gonzalez MD  Cardiology  [facility]

## 2019-03-22 NOTE — ASSESSMENT & PLAN NOTE
Patient is a 64 yo male w/ past medical history as mentioned above who presented to the ED on 3/15 w/ symptoms of EtOH withdrawal as well as BB overdose. Patient w/ suspected seizure in the ED (as per daughter), and presumably after that a code blue was called and patient did achieve ROSC w/ ACLS. He was intubated, sedated on propofol as well as placed on Versed drip. Patient extubated on 03/21 and both propofol and versed were stopped on 3/21 at 15:00.  Neurology consulted for persistent confusion.   At this time, believe that this confusion is multifactorial. Patient w/ likely component of ICU delirium, EtOH withdrawal as well as insomnia. His exam is non-focal in terms of motor/sensory deficits and his only present issue is cognitive dysfunction. CTH unremarkable. He does appear tremulous, diaphoretic and although he does have elevated HR (although w/ Hx of Afib) and at times, elevated BP.     - Would recommend Thiamine replacement daily ( 100 mg PO) indefinitely for this gentleman   - Patient would benefit from Delirium precautions   - CIWA protocol should be followed strictly, and he could also benefit from scheduled benzodiazapine , w/ PRN doses for breakthrough symptoms   - If require further recommendations, please contact our Psychiatry [employer] for their recommendations   - Patient and family report insomnia   - Could consider small doses of Depakote vs Seroquel QHS PRN   - Daily EKG to monitor QTc if Seroquel chosen   - Patient w/ 100.7 Tmax in past 24 hours as well as b/l wheezing noted on exam   - Scheduled breathing treatments recommended for  - Very low suspicion for primary neurological issues causing patients deficits, especially as he is improving even this AM.  - Appreciate consult

## 2019-03-22 NOTE — SUBJECTIVE & OBJECTIVE
Past Medical History:   Diagnosis Date    A-fib     Alcohol abuse     Arthritis     Chronic gout     Diabetes mellitus     DM (diabetes mellitus) 11/16/2016    Fatty liver     Hyperlipidemia     Renal cell cancer 2014       Past Surgical History:   Procedure Laterality Date    ABSCESS DRAINAGE      perirectal    COLONOSCOPY      FISTULOTOMY N/A 5/19/2015    Performed by Shane Hughes MD at [facility] OR 2ND FLR    HAND SURGERY Left     HEMORRHOIDECTOMY N/A 5/19/2015    Performed by Shane Hughes [facility] at [facility] OR 2ND FLR    KIDNEY SURGERY      partial left kidney removal - [place]    PARTIAL NEPHRECTOMY Left August 2014    ROBOTIC ASSISTED LAPAROSCOPIC NEPHRECTOMY PARTIAL Left 8/28/2014    Performed by Fabian Estevez MD at [facility] OR 2ND FLR       Review of patient's allergies indicates:   Allergen Reactions    No known drug allergies        No current facility-administered medications on file prior to encounter.      Current Outpatient Medications on File Prior to Encounter   Medication Sig    allopurinol (ZYLOPRIM) 100 MG tablet TAKE 2 TABLETS BY MOUTH DAILY    apixaban (ELIQUIS) 5 mg Tab Take 1 tablet (5 mg total) by mouth 2 (two) times daily.    diltiaZEM (DILACOR XR) 120 MG CDCR Take 1 capsule (120 mg total) by mouth once daily.    fenofibrate 160 MG Tab Take 1 tablet (160 mg total) by mouth every evening.    flecainide (TAMBOCOR) 150 MG Tab Take 1 tablet (150 mg total) by mouth 2 (two) times daily.    LORazepam (ATIVAN) 0.5 MG tablet TAKE 1 TABLET BY MOUTH EVERY 12 HOURS AS NEEDED FOR ANXIETY    metoprolol tartrate (LOPRESSOR) 25 MG tablet Take 1 tablet (25 mg total) by mouth 2 (two) times daily as needed.    omega-3 acid ethyl esters (LOVAZA) 1 gram capsule Take 2 g by mouth 2 (two) times daily.    ranitidine (ZANTAC) 150 MG capsule Take 150 mg by mouth 2 (two) times daily.    rosuvastatin (CRESTOR) 10 MG tablet Take 1 tablet (10 mg total) by mouth once daily.    aspirin 81 MG Chew  Take 1 tablet (81 mg total) by mouth once daily.     Family History     Problem Relation (Age of Onset)    Cancer Father    Colon polyps Brother    Diabetes Mother    Lung cancer Father, Sister        Tobacco Use    Smoking status: Never Smoker    Smokeless tobacco: Never Used   Substance and Sexual Activity    Alcohol use: Yes     Alcohol/week: 4.2 oz     Types: 7 Cans of beer per week     Comment: patient states he drinks less than he use to    Drug use: No    Sexual activity: Not on file     Review of Systems   Constitutional: Positive for diaphoresis, fatigue and fever.   Eyes: Positive for visual disturbance.   Respiratory: Positive for shortness of breath.    Cardiovascular: Positive for palpitations.   Gastrointestinal: Negative for nausea and vomiting.   Genitourinary: Negative.    Musculoskeletal: Negative.    Skin: Negative.    Allergic/Immunologic: Negative.    Neurological: Positive for tremors and weakness.   Psychiatric/Behavioral: Positive for confusion, decreased concentration, hallucinations and sleep disturbance.     Objective:     Vital Signs (Most Recent):  Temp: 99.1 °F (37.3 °C) (03/22/19 0701)  Pulse: 79 (03/22/19 1000)  Resp: (!) 28 (03/22/19 1000)  BP: 137/74 (03/22/19 1000)  SpO2: 100 % (03/22/19 1000) Vital Signs (24h Range):  Temp:  [98.4 °F (36.9 °C)-100.7 °F (38.2 °C)] 99.1 °F (37.3 °C)  Pulse:  [] 79  Resp:  [23-34] 28  SpO2:  [93 %-100 %] 100 %  BP: (102-165)/() 137/74     Weight: 91.1 kg (200 lb 13.4 oz)  Body mass index is 32.44 kg/m².    Physical Exam  General:  Well-developed, well-nourished, middle aged male, resting in bed  HEENT:  NCAT, PERRLA, EOMI, oropharyngeal membranes non-erythematous, some lingual tremors noted  Neck:  Supple, normal ROM without nuchal rigidity  Resp:  Symmetric expansion, wheezing noted b/l  CVS:  No LE edema, peripheral pulses 2+ (radial, dorsalis pedis)  GI:  Abd soft, distended, non-tender to palpation  Neurologic Exam:  Mental  Status:  AAO to self, hospital. Thinks its 1990, states he is 63 yo. Able to recognize son-in-law and state his name after several wrong attempts. States that he is here for his back issues. Does accurately report his alcohol intake and last day he drank an alcoholic beverage.  Speech somewhat tangential at times. Able to follow commands consistently, especially w/ encouragement.    Cranial Nerves:  VFs intact on counting fingers in all quadrants bilaterally.  PERRLA, EOMI.  Facial movement, sensation intact and symmetric.  Palate raises symmetrically, tongue protrudes midline.  Trapezius, SCM strength 5/5 bilaterally.  Motor:  Normal bulk and tone.  BUE shoulder abduction 5/5, biceps/triceps 4/5,   strength 4+/5.  BLE hip flexors, knee flexion/extension, plantarflexion/dorsiflexion 5/5.  Sensory:  Intact to light touch at all extremities and face without inattention.    Reflexes:  Biceps, brachioradialis 2+ and symmetric, patellar, Achilles 1+ and symmetric.  No ankle clonus.  Downgoing toe bilaterally.  Coordination:  No resting tremor. Some action tremulousness noted. Patient difficulty w/ following commands to complete test accurately.   Gait: Deferred          Significant Labs:   Hemoglobin A1c: No results for input(s): HGBA1C in the last 720 hours.  Blood Culture:   Recent Labs   Lab 03/21/19  1710 03/21/19  1715   LABBLOO No Growth to date No Growth to date     BMP:   Recent Labs   Lab 03/21/19  0305 03/21/19  1315 03/22/19  0322   * 169* 187*    137 139   K 3.6 4.1 3.6    102 102   CO2 24 23 25   BUN 9 10 11   CREATININE 0.8 0.9 0.8   CALCIUM 9.7 9.6 9.9   MG 1.5* 1.9 1.7     CBC:   Recent Labs   Lab 03/21/19  0305 03/22/19  0322   WBC 7.35 7.01   HGB 11.1* 9.9*   HCT 35.3* 31.1*   * 157     CMP:   Recent Labs   Lab 03/21/19  0305 03/21/19  1315 03/22/19  0322   * 169* 187*    137 139   K 3.6 4.1 3.6    102 102   CO2 24 23 25   BUN 9 10 11   CREATININE 0.8 0.9  0.8   CALCIUM 9.7 9.6 9.9   MG 1.5* 1.9 1.7   PROT 6.2  --  6.6   ALBUMIN 2.6*  --  2.9*   BILITOT 0.9  --  1.2*   ALKPHOS 52*  --  51*   AST 49*  --  56*   ALT 69*  --  62*   ANIONGAP 10 12 12   EGFRNONAA >60.0 >60.0 >60.0     CSF Culture: No results for input(s): CSFCULTURE in the last 48 hours.  CSF Studies: No results for input(s): ALIQUT, APPEARCSF, COLORCSF, CSFWBC, CSFRBC, GLUCCSF, LDHCSF, PROTEINCSF, VDRLCSF in the last 48 hours.  Inflammatory Markers: No results for input(s): SEDRATE, CRP, PROCAL in the last 48 hours.  POCT Glucose: No results for input(s): POCTGLUCOSE in the last 24 hours.  Prealbumin: No results for input(s): PREALBUMIN in the last 48 hours.  Respiratory Culture:   Recent Labs   Lab 03/21/19  1230   GSRESP Rare WBC's  No organisms seen   RESPIRATORYC No Growth     Urine Culture: No results for input(s): LABURIN in the last 48 hours.  Urine Studies:   Recent Labs   Lab 03/21/19  1840   COLORU Yellow   APPEARANCEUA Clear   PHUR 6.0   SPECGRAV 1.010   PROTEINUA Negative   GLUCUA 3+*   KETONESU Negative   BILIRUBINUA Negative   OCCULTUA 1+*   NITRITE Negative   LEUKOCYTESUR Negative   RBCUA 5*   WBCUA 1   BACTERIA None     All pertinent lab results from the past 24 hours have been reviewed.    Significant Imaging: I have reviewed all pertinent imaging results/findings within the past 24 hours.   CTH  There is generalized cerebral volume loss with compensatory enlargement ventricles sulci and cisterns without hydrocephalus.  There is no evidence for acute intracranial hemorrhage or sulcal effacement to suggest large territory recent infarction.  Study is limited by patient motion.  Lobular opacity right maxillary antra suggestive for mucous retention cyst with mild probable left maxillary mucosal thickening.

## 2019-03-22 NOTE — PLAN OF CARE
Problem: Adult Inpatient Plan of Care  Goal: Plan of Care Review  Outcome: Ongoing (interventions implemented as appropriate)  Patient's VSS. Remains on heparin gtt. Extremely restless and agitated at times, despite frequent ativan IV pushes ,  Confused and hallucinating, awake throughout the night. CVP 7-8. Electrolytes replaced this am. Frequent vitals and assessment per flowsheet, plan of care reviewed with pt.'s son, questions/concerns addressed, will continue to monitor pt.

## 2019-03-22 NOTE — ASSESSMENT & PLAN NOTE
Patient with AMS, likely metabolic encephalopathy or delirium  - Delirium precautions  - EEG on admission negative  - CT on admission and on 3/22 shows no acute process  - MRI ordered

## 2019-03-22 NOTE — PROGRESS NOTES
"  [facility]  Adult Nutrition  Consult Note    SUMMARY     Recommendations    1. If able to advance diet, recommend 2000 kcal ADA diet (texture per SLP).   2. If unable to advance diet, place NGT & resume enteral nutrition. Rec'd Glucerna 1.5 @ 55 mL/hr to provide 1980 kcals, 109 g of protein, 1002 mL fluid.    - Hold for residuals >500 mL; additional fluid per MD.  3. RD to monitor & follow-up.    Goals: Meet % EEN, EPN  Nutrition Goal Status: goal not met  Communication of RD Recs: reviewed with RN    Reason for Assessment    Reason For Assessment: RD follow-up  Diagnosis: other (see comments)(Alcohol withdrawl)  Relevant Medical History: Etoh abuse, DM  Interdisciplinary Rounds: did not attend    General Information Comments: Pt extubated yesterday, awaiting ST evaluation. Disoriented & agitated, per RN notes. At initiation RD visit, family member at bedside reports pt w/ poor appetite "off and on" PTA. States when pt would "drink a lot of alcohol, his appetite would decrease." States UBW is around 185#, which chart review confirms. NFPE complete 3/19, pt w/ no physical signs of malnutrition.  Nutrition Discharge Planning: Unable to determine    Nutrition/Diet History    Patient Reported Diet/Restrictions/Preferences: other (see comments)(NICOLE)  Spiritual, Cultural Beliefs, Alevism Practices, Values that Affect Care: no  Factors Affecting Nutritional Intake: NPO    Anthropometrics    Temp: 99.1 °F (37.3 °C)  Height: 5' 5.98" (167.6 cm)  Height (inches): 65.98 in  Weight Method: Bed Scale  Weight: 91.1 kg (200 lb 13.4 oz)  Weight (lb): 200.84 lb  Ideal Body Weight (IBW), Male: 141.88 lb  % Ideal Body Weight, Male (lb): 141.56 lb  BMI (Calculated): 32.5  BMI Grade: 30 - 34.9- obesity - grade I  Usual Body Weight (UBW), k kg  % Usual Body Weight: 107.49  % Weight Change From Usual Weight: 7.26 %    Lab/Procedures/Meds    Pertinent Labs Reviewed: reviewed  Pertinent Labs Comments: Gluc " 187, A1C 7.8  Pertinent Medications Reviewed: reviewed  Pertinent Medications Comments: Heparin, Levophed, MVI    Estimated/Assessed Needs    Weight Used For Calorie Calculations: 86.5 kg (190 lb 11.2 oz)(Dosing wt)     Energy Calorie Requirements (kcal): 1996 kcal/d  Energy Need Method: Cannon-St Jeor(1.25 PAL)     Protein Requirements:  g/d (1-1.2 g/kg)  Weight Used For Protein Calculations: 86.5 kg (190 lb 11.2 oz)     Estimated Fluid Requirement Method: other (see comments)(Per MD or 1 mL/kcal)     CHO Requirement: 50% total kcals    Nutrition Prescription Ordered    Current Diet Order: NPO  Current Nutrition Support Formula Ordered: Discontinued     Evaluation of Received Nutrient/Fluid Intake    Comments: LBM: 3/22    Nutrition Risk    Level of Risk/Frequency of Follow-up: (2x/week)     Assessment and Plan    Nutrition Problem  Inadequate energy intake    Related to (etiology):   Inability to consume sufficient energy    Signs and Symptoms (as evidenced by):   NPO with no alternate means of nutrition     Nutrition Diagnosis Status:   New     Monitor and Evaluation    Food and Nutrient Intake: energy intake, food and beverage intake, enteral nutrition intake  Food and Nutrient Adminstration: diet order, enteral and parenteral nutrition administration  Physical Activity and Function: nutrition-related ADLs and IADLs  Anthropometric Measurements: weight, weight change  Biochemical Data, Medical Tests and Procedures: lipid profile, inflammatory profile, glucose/endocrine profile, gastrointestinal profile, electrolyte and renal panel  Nutrition-Focused Physical Findings: overall appearance     Nutrition Follow-Up    RD Follow-up?: Yes

## 2019-03-23 PROBLEM — G93.41 ACUTE METABOLIC ENCEPHALOPATHY: Status: ACTIVE | Noted: 2019-03-22

## 2019-03-23 LAB
ALBUMIN SERPL BCP-MCNC: 3 G/DL
ALBUMIN SERPL BCP-MCNC: 3 G/DL
ALP SERPL-CCNC: 52 U/L
ALP SERPL-CCNC: 52 U/L
ALT SERPL W/O P-5'-P-CCNC: 60 U/L
ALT SERPL W/O P-5'-P-CCNC: 60 U/L
ANION GAP SERPL CALC-SCNC: 11 MMOL/L
APTT BLDCRRT: 57 SEC
AST SERPL-CCNC: 51 U/L
AST SERPL-CCNC: 51 U/L
BACTERIA SPEC AEROBE CULT: NO GROWTH
BASOPHILS # BLD AUTO: 0.1 K/UL
BASOPHILS NFR BLD: 1.7 %
BILIRUB DIRECT SERPL-MCNC: 0.7 MG/DL
BILIRUB SERPL-MCNC: 1.2 MG/DL
BILIRUB SERPL-MCNC: 1.2 MG/DL
BUN SERPL-MCNC: 11 MG/DL
CALCIUM SERPL-MCNC: 10.2 MG/DL
CHLORIDE SERPL-SCNC: 101 MMOL/L
CO2 SERPL-SCNC: 28 MMOL/L
CREAT SERPL-MCNC: 0.7 MG/DL
DIFFERENTIAL METHOD: ABNORMAL
EOSINOPHIL # BLD AUTO: 0.1 K/UL
EOSINOPHIL NFR BLD: 2.4 %
ERYTHROCYTE [DISTWIDTH] IN BLOOD BY AUTOMATED COUNT: 13 %
EST. GFR  (AFRICAN AMERICAN): >60 ML/MIN/1.73 M^2
EST. GFR  (NON AFRICAN AMERICAN): >60 ML/MIN/1.73 M^2
GLUCOSE SERPL-MCNC: 174 MG/DL
GRAM STN SPEC: NORMAL
GRAM STN SPEC: NORMAL
HCT VFR BLD AUTO: 32.9 %
HGB BLD-MCNC: 10.8 G/DL
IMM GRANULOCYTES # BLD AUTO: 0.12 K/UL
IMM GRANULOCYTES NFR BLD AUTO: 2.1 %
INR PPP: 1
LYMPHOCYTES # BLD AUTO: 0.8 K/UL
LYMPHOCYTES NFR BLD: 13.7 %
MAGNESIUM SERPL-MCNC: 1.6 MG/DL
MCH RBC QN AUTO: 33.1 PG
MCHC RBC AUTO-ENTMCNC: 32.8 G/DL
MCV RBC AUTO: 101 FL
MONOCYTES # BLD AUTO: 0.7 K/UL
MONOCYTES NFR BLD: 11.5 %
NEUTROPHILS # BLD AUTO: 4 K/UL
NEUTROPHILS NFR BLD: 68.6 %
NRBC BLD-RTO: 0 /100 WBC
PLATELET # BLD AUTO: 155 K/UL
PMV BLD AUTO: 9.9 FL
POTASSIUM SERPL-SCNC: 3.3 MMOL/L
PROT SERPL-MCNC: 6.9 G/DL
PROT SERPL-MCNC: 6.9 G/DL
PROTHROMBIN TIME: 10.5 SEC
RBC # BLD AUTO: 3.26 M/UL
SODIUM SERPL-SCNC: 140 MMOL/L
VANCOMYCIN TROUGH SERPL-MCNC: 17.4 UG/ML
WBC # BLD AUTO: 5.85 K/UL

## 2019-03-23 PROCEDURE — 80053 COMPREHEN METABOLIC PANEL: CPT

## 2019-03-23 PROCEDURE — 94761 N-INVAS EAR/PLS OXIMETRY MLT: CPT

## 2019-03-23 PROCEDURE — 25000003 PHARM REV CODE 250: Performed by: STUDENT IN AN ORGANIZED HEALTH CARE EDUCATION/TRAINING PROGRAM

## 2019-03-23 PROCEDURE — 94667 MNPJ CHEST WALL 1ST: CPT

## 2019-03-23 PROCEDURE — 63600175 PHARM REV CODE 636 W HCPCS: Performed by: STUDENT IN AN ORGANIZED HEALTH CARE EDUCATION/TRAINING PROGRAM

## 2019-03-23 PROCEDURE — 80076 HEPATIC FUNCTION PANEL: CPT

## 2019-03-23 PROCEDURE — 27000221 HC OXYGEN, UP TO 24 HOURS

## 2019-03-23 PROCEDURE — 25000003 PHARM REV CODE 250: Performed by: CLINICAL NURSE SPECIALIST

## 2019-03-23 PROCEDURE — 63600175 PHARM REV CODE 636 W HCPCS: Performed by: INTERNAL MEDICINE

## 2019-03-23 PROCEDURE — 83735 ASSAY OF MAGNESIUM: CPT

## 2019-03-23 PROCEDURE — 85025 COMPLETE CBC W/AUTO DIFF WBC: CPT

## 2019-03-23 PROCEDURE — 99233 SBSQ HOSP IP/OBS HIGH 50: CPT | Mod: ,,, | Performed by: INTERNAL MEDICINE

## 2019-03-23 PROCEDURE — 25000003 PHARM REV CODE 250: Performed by: INTERNAL MEDICINE

## 2019-03-23 PROCEDURE — 99233 PR SUBSEQUENT HOSPITAL CARE,LEVL III: ICD-10-PCS | Mod: ,,, | Performed by: INTERNAL MEDICINE

## 2019-03-23 PROCEDURE — 20000000 HC ICU ROOM

## 2019-03-23 PROCEDURE — 80202 ASSAY OF VANCOMYCIN: CPT

## 2019-03-23 PROCEDURE — 85730 THROMBOPLASTIN TIME PARTIAL: CPT

## 2019-03-23 PROCEDURE — 85610 PROTHROMBIN TIME: CPT

## 2019-03-23 PROCEDURE — A4216 STERILE WATER/SALINE, 10 ML: HCPCS | Performed by: INTERNAL MEDICINE

## 2019-03-23 RX ORDER — METOPROLOL TARTRATE 25 MG/1
25 TABLET, FILM COATED ORAL 2 TIMES DAILY
Status: DISCONTINUED | OUTPATIENT
Start: 2019-03-23 | End: 2019-03-27

## 2019-03-23 RX ORDER — ACETAMINOPHEN 325 MG/1
650 TABLET ORAL EVERY 6 HOURS PRN
Status: DISCONTINUED | OUTPATIENT
Start: 2019-03-23 | End: 2019-03-23

## 2019-03-23 RX ORDER — ACETAMINOPHEN 325 MG/1
650 TABLET ORAL ONCE
Status: COMPLETED | OUTPATIENT
Start: 2019-03-23 | End: 2019-03-23

## 2019-03-23 RX ORDER — FLECAINIDE ACETATE 50 MG/1
150 TABLET ORAL 2 TIMES DAILY
Status: DISCONTINUED | OUTPATIENT
Start: 2019-03-23 | End: 2019-03-23

## 2019-03-23 RX ORDER — QUETIAPINE FUMARATE 25 MG/1
25 TABLET, FILM COATED ORAL NIGHTLY
Status: DISCONTINUED | OUTPATIENT
Start: 2019-03-23 | End: 2019-03-27

## 2019-03-23 RX ORDER — DILTIAZEM HYDROCHLORIDE 120 MG/1
120 CAPSULE, COATED, EXTENDED RELEASE ORAL DAILY
Status: DISCONTINUED | OUTPATIENT
Start: 2019-03-24 | End: 2019-03-27

## 2019-03-23 RX ADMIN — ACETAMINOPHEN 650 MG: 325 TABLET ORAL at 08:03

## 2019-03-23 RX ADMIN — METOPROLOL TARTRATE 25 MG: 25 TABLET ORAL at 08:03

## 2019-03-23 RX ADMIN — POTASSIUM CHLORIDE 40 MEQ: 400 INJECTION, SOLUTION INTRAVENOUS at 05:03

## 2019-03-23 RX ADMIN — QUETIAPINE FUMARATE 25 MG: 25 TABLET, FILM COATED ORAL at 08:03

## 2019-03-23 RX ADMIN — CEFEPIME 1 G: 1 INJECTION, POWDER, FOR SOLUTION INTRAMUSCULAR; INTRAVENOUS at 03:03

## 2019-03-23 RX ADMIN — HEPARIN SODIUM AND DEXTROSE 25 UNITS/KG/HR: 10000; 5 INJECTION INTRAVENOUS at 11:03

## 2019-03-23 RX ADMIN — HEPARIN SODIUM AND DEXTROSE 25 UNITS/KG/HR: 10000; 5 INJECTION INTRAVENOUS at 10:03

## 2019-03-23 RX ADMIN — MICONAZOLE NITRATE: 20 POWDER TOPICAL at 10:03

## 2019-03-23 RX ADMIN — DEXMEDETOMIDINE HYDROCHLORIDE 1.2 MCG/KG/HR: 100 INJECTION, SOLUTION, CONCENTRATE INTRAVENOUS at 03:03

## 2019-03-23 RX ADMIN — Medication 3 ML: at 06:03

## 2019-03-23 RX ADMIN — DEXMEDETOMIDINE HYDROCHLORIDE 0.8 MCG/KG/HR: 100 INJECTION, SOLUTION, CONCENTRATE INTRAVENOUS at 10:03

## 2019-03-23 RX ADMIN — Medication 1250 MG: at 06:03

## 2019-03-23 RX ADMIN — MAGNESIUM SULFATE 2 G: 2 INJECTION INTRAVENOUS at 05:03

## 2019-03-23 RX ADMIN — DEXMEDETOMIDINE HYDROCHLORIDE 1 MCG/KG/HR: 100 INJECTION, SOLUTION, CONCENTRATE INTRAVENOUS at 06:03

## 2019-03-23 RX ADMIN — THIAMINE HYDROCHLORIDE 100 MG: 100 INJECTION, SOLUTION INTRAMUSCULAR; INTRAVENOUS at 09:03

## 2019-03-23 RX ADMIN — MICONAZOLE NITRATE: 20 POWDER TOPICAL at 09:03

## 2019-03-23 NOTE — PT/OT/SLP PROGRESS
Occupational Therapy      Patient Name:  Donald Warren Abadie   MRN:  [id number]    Patient not seen today secondary to hold per nurse as pt still confused and agitated. Will follow-up on 3/24.    JAZLYN Martin  3/23/2019

## 2019-03-23 NOTE — PLAN OF CARE
Problem: Adult Inpatient Plan of Care  Goal: Plan of Care Review  Outcome: Ongoing (interventions implemented as appropriate)  No acute changes overnight, VSS. Remains on heparin and precedex gtts. Pt. Remains confused, restless and agitated at times. Electrolytes replaced . Frequent vitals and assessment per flowsheet. Plan of care reviewed with Mr. Abadie and his daughter, concerns addressed, will continue to monitor pt.

## 2019-03-23 NOTE — HOSPITAL COURSE
3/23- Continue precedex, wean as tolerated. MRI shows no acute change. Bedside swallow, dc amaya, central line and abx.   3/24: Precedex stopped, Seroquel started. Hypotensive and Tachycardic, NS bolus with improvement  Metoprolol, diltiazem restarted for Afib, intermittent RVR and he has tolerated well. Mental status slowly improving.

## 2019-03-23 NOTE — SUBJECTIVE & OBJECTIVE
Significant Events/Interval History:  Hypertensive overnight, received a dose of hydralazine. Normotensive this am. Some noted agitation, remains on precedex, will titrate down as tolerated. Continue delirium precautions. Will discontinue amaya, central line and abx today. Bedside swallow eval this am, will reassess and restart home PO medications when tolerated.     Review of Systems   Unable to perform ROS: Mental status change     Objective:     Vital Signs (Most Recent):  Temp: 97.6 °F (36.4 °C) (03/23/19 0700)  Pulse: 65 (03/23/19 1000)  Resp: (!) 22 (03/23/19 1000)  BP: (!) 159/127 (03/23/19 1000)  SpO2: 100 % (03/23/19 1000) Vital Signs (24h Range):  Temp:  [97.2 °F (36.2 °C)-97.9 °F (36.6 °C)] 97.6 °F (36.4 °C)  Pulse:  [48-98] 65  Resp:  [18-30] 22  SpO2:  [97 %-100 %] 100 %  BP: (120-180)/() 159/127   Weight: 81.6 kg (179 lb 14.3 oz)  Body mass index is 29.05 kg/m².      Intake/Output Summary (Last 24 hours) at 3/23/2019 1145  Last data filed at 3/23/2019 0900  Gross per 24 hour   Intake 1565.83 ml   Output 3215 ml   Net -1649.17 ml       Physical Exam   Constitutional: He appears well-developed. He is uncooperative.   Agitated but oriented   HENT:   Head: Normocephalic.   Eyes: Pupils are equal, round, and reactive to light. Right pupil is round. Left pupil is round.   Cardiovascular: Intact distal pulses. Tachycardia present.   Pulmonary/Chest: He has decreased breath sounds in the right lower field and the left lower field. He has rhonchi in the right upper field and the left upper field.   Abdominal: Soft. Normal appearance and bowel sounds are normal. There is no tenderness.   Neurological: He is alert. GCS eye subscore is 4. GCS verbal subscore is 4. GCS motor subscore is 5.   Skin: Skin is warm and dry.   Psychiatric: His affect is labile. His speech is delayed. He is agitated. Cognition and memory are impaired. He expresses impulsivity.   Nursing note and vitals reviewed.        Lines/Drains/Airways     Central Venous Catheter Line                 Percutaneous Central Line Insertion/Assessment - triple lumen  03/17/19 0300 right internal jugular 6 days          Drain                 Urethral Catheter 03/15/19 1206 Double-lumen 16 Fr. 7 days              Significant Labs:    CBC/Anemia Profile:  Recent Labs   Lab 03/22/19  0322 03/23/19  0322   WBC 7.01 5.85   HGB 9.9* 10.8*   HCT 31.1* 32.9*    155   * 101*   RDW 13.5 13.0        Chemistries:  Recent Labs   Lab 03/22/19  0322 03/22/19  1220 03/23/19  0311    137 140   K 3.6 4.1 3.3*    104 101   CO2 25 22* 28   BUN 11 10 11   CREATININE 0.8 0.8 0.7   CALCIUM 9.9 9.9 10.2   ALBUMIN 2.9*  --  3.0*  3.0*   PROT 6.6  --  6.9  6.9   BILITOT 1.2*  --  1.2*  1.2*   ALKPHOS 51*  --  52*  52*   ALT 62*  --  60*  60*   AST 56*  --  51*  51*   MG 1.7 2.1 1.6       All pertinent labs within the past 24 hours have been reviewed.    Significant Imaging: I have reviewed and interpreted all pertinent imaging results/findings within the past 24 hours.

## 2019-03-23 NOTE — PROGRESS NOTES
[facility]  Critical Care Medicine  Progress Note    Patient Name: Donald Warren Abadie  MRN: [id number]  Admission Date: 3/15/2019  Hospital Length of Stay: 8 days  Code Status: Full Code  Attending Provider: Bony Cartagena*  Primary Care Provider: Bacilio Larry MD   Principal Problem: Encephalopathy    Subjective:     HPI:  Mr Abadie is a 62 y/o male with PMH of paroxysmal AF, EtOH abuse (PSYCH offered rehabilitation but declined), HTN, HLD, and elevated LFTs who presented to [facility] ED on 3/15 with fatigue/malaise. There was concern that he had taken an accidental overdose of BB and flecainide because he felt that he was having tachycardia. He had also being drinking and his daughter reports hallucinations before ED admission. In ED, the EKG showed severe bradycardia. Reportedly, the patient displayed symptoms of EtOH withdrawal including anxiety, diaphoresis, agitation and hallucinations. There was concerned about a seizure prior to the patient going into into cardiac arrest.  A TVP was placed for HR in the 40s, post-ROSC. The patient remained intubated until 3/21. He was placed on Propofol and Versed during that time. He intermittently experience Afib/flutter and has been on a heparin infusion. He has been treated for alcohol withdrawal with a Librium taper. Since extubation, the patient has experienced altered mental status, does not have any focal deficits, and is oriented to place and person.  The MICU service has been asked to assist with clinical management.           Hospital/ICU Course:  3/23- Continue precedex, wean as tolerated. MRI shows no acute change. Bedside swallow, dc amaya, central line and abx.     Significant Events/Interval History:  Hypertensive overnight, received a dose of hydralazine. Normotensive this am. Some noted agitation, remains on precedex, will titrate down as tolerated. Continue delirium precautions. Will discontinue amaya, central line and abx today.  Bedside swallow eval this am, will reassess and restart home PO medications when tolerated.     Review of Systems   Unable to perform ROS: Mental status change     Objective:     Vital Signs (Most Recent):  Temp: 97.6 °F (36.4 °C) (03/23/19 0700)  Pulse: 65 (03/23/19 1000)  Resp: (!) 22 (03/23/19 1000)  BP: (!) 159/127 (03/23/19 1000)  SpO2: 100 % (03/23/19 1000) Vital Signs (24h Range):  Temp:  [97.2 °F (36.2 °C)-97.9 °F (36.6 °C)] 97.6 °F (36.4 °C)  Pulse:  [48-98] 65  Resp:  [18-30] 22  SpO2:  [97 %-100 %] 100 %  BP: (120-180)/() 159/127   Weight: 81.6 kg (179 lb 14.3 oz)  Body mass index is 29.05 kg/m².      Intake/Output Summary (Last 24 hours) at 3/23/2019 1145  Last data filed at 3/23/2019 0900  Gross per 24 hour   Intake 1565.83 ml   Output 3215 ml   Net -1649.17 ml       Physical Exam   Constitutional: He appears well-developed. He is uncooperative.   Agitated but oriented   HENT:   Head: Normocephalic.   Eyes: Pupils are equal, round, and reactive to light. Right pupil is round. Left pupil is round.   Cardiovascular: Intact distal pulses. Tachycardia present.   Pulmonary/Chest: He has decreased breath sounds in the right lower field and the left lower field. He has rhonchi in the right upper field and the left upper field.   Abdominal: Soft. Normal appearance and bowel sounds are normal. There is no tenderness.   Neurological: He is alert. GCS eye subscore is 4. GCS verbal subscore is 4. GCS motor subscore is 5.   Skin: Skin is warm and dry.   Psychiatric: His affect is labile. His speech is delayed. He is agitated. Cognition and memory are impaired. He expresses impulsivity.   Nursing note and vitals reviewed.       Lines/Drains/Airways     Central Venous Catheter Line                 Percutaneous Central Line Insertion/Assessment - triple lumen  03/17/19 0300 right internal jugular 6 days          Drain                 Urethral Catheter 03/15/19 1206 Double-lumen 16 Fr. 7 days               Significant Labs:    CBC/Anemia Profile:  Recent Labs   Lab 03/22/19  0322 03/23/19  0322   WBC 7.01 5.85   HGB 9.9* 10.8*   HCT 31.1* 32.9*    155   * 101*   RDW 13.5 13.0        Chemistries:  Recent Labs   Lab 03/22/19  0322 03/22/19  1220 03/23/19  0311    137 140   K 3.6 4.1 3.3*    104 101   CO2 25 22* 28   BUN 11 10 11   CREATININE 0.8 0.8 0.7   CALCIUM 9.9 9.9 10.2   ALBUMIN 2.9*  --  3.0*  3.0*   PROT 6.6  --  6.9  6.9   BILITOT 1.2*  --  1.2*  1.2*   ALKPHOS 51*  --  52*  52*   ALT 62*  --  60*  60*   AST 56*  --  51*  51*   MG 1.7 2.1 1.6       All pertinent labs within the past 24 hours have been reviewed.    Significant Imaging: I have reviewed and interpreted all pertinent imaging results/findings within the past 24 hours.      ABG  Recent Labs   Lab 03/21/19  1657   PH 7.444   PO2 97   PCO2 34.1*   HCO3 23.4*   BE -1     Assessment/Plan:     Neuro   * Encephalopathy    Patient with AMS, likely metabolic encephalopathy or delirium  - Delirium precautions  - EEG on admission negative  - CT on admission and on 3/22 shows no acute process  - MRI shows no acute abnormality and small remote left occipital lobe infarct.     Psychiatric   Alcoholism /alcohol abuse    - Check CIWA-AR  - Precedex infusion  - Prn Ativan       Alcohol withdrawal    Placed on Precedex for now, has completed bezo taper  - On Propofol and Versed while intubated  - CIWA  - PRN Benzo         Cardiac/Vascular   Cardiac arrest    Unclear etiology  - possibly due to BB overdose.  - Glucagon given in ED     Paroxysmal atrial fibrillation    Holding BB, CCB and flecainide at this time  - in SR  - prn metoprolol  - heparin drip     Endocrine   Diabetes mellitus without complication    - Accucheck prn       GI   Elevated LFTs    Continue to trend  - CMP and hepatic panel daily     Other   Fever    - Vancomycin and Cefepime started for fever, will dc today  - Blood cx NGTD  - UA unremarkable        Critical  Care Daily Checklist:    A: Awake: RASS Goal/Actual Goal: RASS Goal: 0-->alert and calm  Actual: [place] Agitation Sedation Scale (RASS): Restless   B: Spontaneous Breathing Trial Performed? Spon. Breathing Trial Initiated?: Initiated (03/21/19 0755)   C: SAT & SBT Coordinated?  N/A                      D: Delirium: CAM-ICU Overall CAM-ICU: Positive   E: Early Mobility Performed? No   F: Feeding Goal: Goals: Meet % EEN, EPN  Status: Nutrition Goal Status: goal not met   Current Diet Order   No orders of the defined types were placed in this encounter.      AS: Analgesia/Sedation Precedex   T: Thromboembolic Prophylaxis    H: HOB > 300 Yes   U: Stress Ulcer Prophylaxis (if needed)    G: Glucose Control    B: Bowel Function Stool Occurrence: 0   I: Indwelling Catheter (Lines & Amaya) Necessity Central Line and amaya - dc today   D: De-escalation of Antimicrobials/Pharmacotherapies DC abx    Plan for the day/ETD Monitor, wean precedex as tolerated     Code Status:  Family/Goals of Care: Full Code         Critical secondary to Patient has an abrupt change in neurologic status: delirium       Critical care was time spent personally by me on the following activities: development of treatment plan with patient or surrogate and bedside caregivers, discussions with consultants, evaluation of patient's response to treatment, examination of patient, ordering and performing treatments and interventions, ordering and review of laboratory studies, ordering and review of radiographic studies, pulse oximetry, re-evaluation of patient's condition. This critical care time did not overlap with that of any other provider or involve time for any procedures.     Ramon Livingston MD  Critical Care Medicine  [facility]

## 2019-03-23 NOTE — ASSESSMENT & PLAN NOTE
Placed on Precedex for now, has completed bezo taper  - On Propofol and Versed while intubated  - CIWA  - PRN Benzo

## 2019-03-24 LAB
ALBUMIN SERPL BCP-MCNC: 3 G/DL (ref 3.5–5.2)
ALP SERPL-CCNC: 56 U/L (ref 55–135)
ALT SERPL W/O P-5'-P-CCNC: 68 U/L (ref 10–44)
ANION GAP SERPL CALC-SCNC: 11 MMOL/L (ref 8–16)
ANION GAP SERPL CALC-SCNC: 12 MMOL/L (ref 8–16)
ANION GAP SERPL CALC-SCNC: 14 MMOL/L (ref 8–16)
APTT BLDCRRT: 49.9 SEC (ref 21–32)
AST SERPL-CCNC: 82 U/L (ref 10–40)
BASOPHILS # BLD AUTO: 0.1 K/UL (ref 0–0.2)
BASOPHILS NFR BLD: 1.2 % (ref 0–1.9)
BILIRUB SERPL-MCNC: 1.2 MG/DL (ref 0.1–1)
BUN SERPL-MCNC: 10 MG/DL (ref 8–23)
BUN SERPL-MCNC: 12 MG/DL (ref 8–23)
BUN SERPL-MCNC: 13 MG/DL (ref 8–23)
CALCIUM SERPL-MCNC: 10.3 MG/DL (ref 8.7–10.5)
CALCIUM SERPL-MCNC: 9.2 MG/DL (ref 8.7–10.5)
CALCIUM SERPL-MCNC: 9.4 MG/DL (ref 8.7–10.5)
CHLORIDE SERPL-SCNC: 100 MMOL/L (ref 95–110)
CHLORIDE SERPL-SCNC: 103 MMOL/L (ref 95–110)
CHLORIDE SERPL-SCNC: 104 MMOL/L (ref 95–110)
CO2 SERPL-SCNC: 22 MMOL/L (ref 23–29)
CO2 SERPL-SCNC: 24 MMOL/L (ref 23–29)
CO2 SERPL-SCNC: 24 MMOL/L (ref 23–29)
CREAT SERPL-MCNC: 0.8 MG/DL (ref 0.5–1.4)
DIFFERENTIAL METHOD: ABNORMAL
EOSINOPHIL # BLD AUTO: 0.1 K/UL (ref 0–0.5)
EOSINOPHIL NFR BLD: 1.3 % (ref 0–8)
ERYTHROCYTE [DISTWIDTH] IN BLOOD BY AUTOMATED COUNT: 12.8 % (ref 11.5–14.5)
EST. GFR  (AFRICAN AMERICAN): >60 ML/MIN/1.73 M^2
EST. GFR  (NON AFRICAN AMERICAN): >60 ML/MIN/1.73 M^2
GLUCOSE SERPL-MCNC: 125 MG/DL (ref 70–110)
GLUCOSE SERPL-MCNC: 132 MG/DL (ref 70–110)
GLUCOSE SERPL-MCNC: 141 MG/DL (ref 70–110)
HCT VFR BLD AUTO: 32.6 % (ref 40–54)
HGB BLD-MCNC: 10.7 G/DL (ref 14–18)
IMM GRANULOCYTES # BLD AUTO: 0.1 K/UL (ref 0–0.04)
IMM GRANULOCYTES NFR BLD AUTO: 1.2 % (ref 0–0.5)
INR PPP: 1 (ref 0.8–1.2)
LYMPHOCYTES # BLD AUTO: 1.4 K/UL (ref 1–4.8)
LYMPHOCYTES NFR BLD: 16.5 % (ref 18–48)
MAGNESIUM SERPL-MCNC: 1.4 MG/DL (ref 1.6–2.6)
MCH RBC QN AUTO: 32.6 PG (ref 27–31)
MCHC RBC AUTO-ENTMCNC: 32.8 G/DL (ref 32–36)
MCV RBC AUTO: 99 FL (ref 82–98)
MONOCYTES # BLD AUTO: 0.8 K/UL (ref 0.3–1)
MONOCYTES NFR BLD: 9.5 % (ref 4–15)
NEUTROPHILS # BLD AUTO: 6 K/UL (ref 1.8–7.7)
NEUTROPHILS NFR BLD: 70.3 % (ref 38–73)
NRBC BLD-RTO: 0 /100 WBC
PHOSPHATE SERPL-MCNC: 4 MG/DL (ref 2.7–4.5)
PLATELET # BLD AUTO: 199 K/UL (ref 150–350)
PMV BLD AUTO: 10.3 FL (ref 9.2–12.9)
POTASSIUM SERPL-SCNC: 3.1 MMOL/L (ref 3.5–5.1)
POTASSIUM SERPL-SCNC: 3.1 MMOL/L (ref 3.5–5.1)
POTASSIUM SERPL-SCNC: 3.5 MMOL/L (ref 3.5–5.1)
PROT SERPL-MCNC: 6.7 G/DL (ref 6–8.4)
PROTHROMBIN TIME: 10.6 SEC (ref 9–12.5)
RBC # BLD AUTO: 3.28 M/UL (ref 4.6–6.2)
SODIUM SERPL-SCNC: 138 MMOL/L (ref 136–145)
TROPONIN I SERPL DL<=0.01 NG/ML-MCNC: 0.1 NG/ML (ref 0–0.03)
WBC # BLD AUTO: 8.52 K/UL (ref 3.9–12.7)

## 2019-03-24 PROCEDURE — 83735 ASSAY OF MAGNESIUM: CPT

## 2019-03-24 PROCEDURE — 25000003 PHARM REV CODE 250: Performed by: STUDENT IN AN ORGANIZED HEALTH CARE EDUCATION/TRAINING PROGRAM

## 2019-03-24 PROCEDURE — 99233 SBSQ HOSP IP/OBS HIGH 50: CPT | Mod: ,,, | Performed by: INTERNAL MEDICINE

## 2019-03-24 PROCEDURE — 20000000 HC ICU ROOM

## 2019-03-24 PROCEDURE — 93010 ELECTROCARDIOGRAM REPORT: CPT | Mod: ,,, | Performed by: INTERNAL MEDICINE

## 2019-03-24 PROCEDURE — 80048 BASIC METABOLIC PNL TOTAL CA: CPT | Mod: 91

## 2019-03-24 PROCEDURE — 63600175 PHARM REV CODE 636 W HCPCS: Performed by: STUDENT IN AN ORGANIZED HEALTH CARE EDUCATION/TRAINING PROGRAM

## 2019-03-24 PROCEDURE — 85730 THROMBOPLASTIN TIME PARTIAL: CPT

## 2019-03-24 PROCEDURE — 97116 GAIT TRAINING THERAPY: CPT

## 2019-03-24 PROCEDURE — 99233 PR SUBSEQUENT HOSPITAL CARE,LEVL III: ICD-10-PCS | Mod: ,,, | Performed by: INTERNAL MEDICINE

## 2019-03-24 PROCEDURE — 93010 EKG 12-LEAD: ICD-10-PCS | Mod: ,,, | Performed by: INTERNAL MEDICINE

## 2019-03-24 PROCEDURE — 94799 UNLISTED PULMONARY SVC/PX: CPT

## 2019-03-24 PROCEDURE — 87449 NOS EACH ORGANISM AG IA: CPT

## 2019-03-24 PROCEDURE — 84100 ASSAY OF PHOSPHORUS: CPT

## 2019-03-24 PROCEDURE — 80048 BASIC METABOLIC PNL TOTAL CA: CPT

## 2019-03-24 PROCEDURE — 63600175 PHARM REV CODE 636 W HCPCS: Performed by: INTERNAL MEDICINE

## 2019-03-24 PROCEDURE — 85025 COMPLETE CBC W/AUTO DIFF WBC: CPT

## 2019-03-24 PROCEDURE — A4216 STERILE WATER/SALINE, 10 ML: HCPCS | Performed by: INTERNAL MEDICINE

## 2019-03-24 PROCEDURE — 85610 PROTHROMBIN TIME: CPT

## 2019-03-24 PROCEDURE — 93005 ELECTROCARDIOGRAM TRACING: CPT

## 2019-03-24 PROCEDURE — 84484 ASSAY OF TROPONIN QUANT: CPT

## 2019-03-24 PROCEDURE — 99900035 HC TECH TIME PER 15 MIN (STAT)

## 2019-03-24 PROCEDURE — 97535 SELF CARE MNGMENT TRAINING: CPT

## 2019-03-24 PROCEDURE — 97161 PT EVAL LOW COMPLEX 20 MIN: CPT

## 2019-03-24 PROCEDURE — 25000003 PHARM REV CODE 250: Performed by: INTERNAL MEDICINE

## 2019-03-24 PROCEDURE — 80053 COMPREHEN METABOLIC PANEL: CPT

## 2019-03-24 PROCEDURE — 94668 MNPJ CHEST WALL SBSQ: CPT

## 2019-03-24 PROCEDURE — 97166 OT EVAL MOD COMPLEX 45 MIN: CPT

## 2019-03-24 RX ORDER — ACETAMINOPHEN 325 MG/1
650 TABLET ORAL EVERY 6 HOURS PRN
Status: DISCONTINUED | OUTPATIENT
Start: 2019-03-24 | End: 2019-03-29 | Stop reason: HOSPADM

## 2019-03-24 RX ORDER — POTASSIUM CHLORIDE 20 MEQ/1
40 TABLET, EXTENDED RELEASE ORAL EVERY 4 HOURS
Status: COMPLETED | OUTPATIENT
Start: 2019-03-24 | End: 2019-03-24

## 2019-03-24 RX ORDER — PROPOFOL 10 MG/ML
INJECTION, EMULSION INTRAVENOUS
Status: DISCONTINUED
Start: 2019-03-24 | End: 2019-03-24 | Stop reason: WASHOUT

## 2019-03-24 RX ORDER — METOPROLOL TARTRATE 1 MG/ML
5 INJECTION, SOLUTION INTRAVENOUS ONCE
Status: COMPLETED | OUTPATIENT
Start: 2019-03-24 | End: 2019-03-24

## 2019-03-24 RX ORDER — MAGNESIUM SULFATE HEPTAHYDRATE 40 MG/ML
2 INJECTION, SOLUTION INTRAVENOUS ONCE
Status: COMPLETED | OUTPATIENT
Start: 2019-03-24 | End: 2019-03-24

## 2019-03-24 RX ORDER — SUCCINYLCHOLINE CHLORIDE 20 MG/ML
INJECTION INTRAMUSCULAR; INTRAVENOUS
Status: DISCONTINUED
Start: 2019-03-24 | End: 2019-03-24 | Stop reason: WASHOUT

## 2019-03-24 RX ORDER — METOPROLOL TARTRATE 1 MG/ML
INJECTION, SOLUTION INTRAVENOUS
Status: DISPENSED
Start: 2019-03-24 | End: 2019-03-25

## 2019-03-24 RX ORDER — ROCURONIUM BROMIDE 10 MG/ML
INJECTION, SOLUTION INTRAVENOUS
Status: DISCONTINUED
Start: 2019-03-24 | End: 2019-03-24 | Stop reason: WASHOUT

## 2019-03-24 RX ORDER — MAGNESIUM SULFATE HEPTAHYDRATE 40 MG/ML
2 INJECTION, SOLUTION INTRAVENOUS ONCE
Status: DISCONTINUED | OUTPATIENT
Start: 2019-03-24 | End: 2019-03-24

## 2019-03-24 RX ORDER — THIAMINE HCL 100 MG
100 TABLET ORAL DAILY
Status: DISCONTINUED | OUTPATIENT
Start: 2019-03-25 | End: 2019-03-29 | Stop reason: HOSPADM

## 2019-03-24 RX ORDER — ETOMIDATE 2 MG/ML
INJECTION INTRAVENOUS
Status: DISCONTINUED
Start: 2019-03-24 | End: 2019-03-24 | Stop reason: WASHOUT

## 2019-03-24 RX ADMIN — METOPROLOL TARTRATE 5 MG: 5 INJECTION, SOLUTION INTRAVENOUS at 11:03

## 2019-03-24 RX ADMIN — POTASSIUM CHLORIDE 40 MEQ: 1500 TABLET, EXTENDED RELEASE ORAL at 09:03

## 2019-03-24 RX ADMIN — POTASSIUM CHLORIDE 40 MEQ: 1500 TABLET, EXTENDED RELEASE ORAL at 06:03

## 2019-03-24 RX ADMIN — METOPROLOL TARTRATE 5 MG: 5 INJECTION, SOLUTION INTRAVENOUS at 02:03

## 2019-03-24 RX ADMIN — Medication 3 ML: at 06:03

## 2019-03-24 RX ADMIN — MICONAZOLE NITRATE: 20 POWDER TOPICAL at 08:03

## 2019-03-24 RX ADMIN — Medication 3 ML: at 09:03

## 2019-03-24 RX ADMIN — ALUMINUM HYDROXIDE, MAGNESIUM HYDROXIDE, AND SIMETHICONE 50 ML: 200; 200; 20 SUSPENSION ORAL at 01:03

## 2019-03-24 RX ADMIN — POTASSIUM CHLORIDE 40 MEQ: 1500 TABLET, EXTENDED RELEASE ORAL at 04:03

## 2019-03-24 RX ADMIN — SODIUM CHLORIDE 500 ML: 0.9 INJECTION, SOLUTION INTRAVENOUS at 10:03

## 2019-03-24 RX ADMIN — Medication 3 ML: at 02:03

## 2019-03-24 RX ADMIN — THIAMINE HYDROCHLORIDE 100 MG: 100 INJECTION, SOLUTION INTRAMUSCULAR; INTRAVENOUS at 08:03

## 2019-03-24 RX ADMIN — HEPARIN SODIUM AND DEXTROSE 25 UNITS/KG/HR: 10000; 5 INJECTION INTRAVENOUS at 02:03

## 2019-03-24 RX ADMIN — METOPROLOL TARTRATE 25 MG: 25 TABLET ORAL at 08:03

## 2019-03-24 RX ADMIN — ACETAMINOPHEN 650 MG: 325 TABLET ORAL at 08:03

## 2019-03-24 RX ADMIN — QUETIAPINE FUMARATE 25 MG: 25 TABLET, FILM COATED ORAL at 08:03

## 2019-03-24 RX ADMIN — MAGNESIUM SULFATE 2 G: 2 INJECTION INTRAVENOUS at 04:03

## 2019-03-24 NOTE — ASSESSMENT & PLAN NOTE
- Pt has been in afib (on heparin drip)  - Metoprolol and Diltiazem started   - Holding flecainide  - heparin drip

## 2019-03-24 NOTE — PLAN OF CARE
Problem: Adult Inpatient Plan of Care  Goal: Patient-Specific Goal (Individualization)    No acute events throughout day. See vital signs and assessments in flowsheets. See below for updates on today's progress.     Pulmonary: RA. sats %. Infrequent, bright red bloody sputum, thick. MD aware. Wheezing to the airway itself but lung fields clear anteriorally and diminished in bases.     Cardiovascular: Afib with RVR starting at 0900 100-130s. Metroprolol 5mg IV x 2. Pt now in SR 70-80s. BP stable 100-120/60-80. Tmax 100.1.     Neurological: AAOx3 but confused conversation. Easily reoriented. Moves all extremities. Generalized weakness.     Gastrointestinal: BM x 5, loose/liquid stool. Cdiff cx pending collection. Contact precautions for r/o C.diff initiated.     Genitourinary: Clear, yellow adequate per urinal.     Endocrine: no issues     Integumentary/Other: nystatin powder applied to bilateral undearms     Infusions: heparin gtt @ 25 units/kg/hr    Patient progressing towards goals as tolerated, plan of care communicated and reviewed with Donald Warren Abadie and family. All concerns addressed. Will continue to monitor.

## 2019-03-24 NOTE — PT/OT/SLP EVAL
Physical Therapy Evaluation and treatment    Patient Name:  Donald Warren Abadie   MRN:  [id number]    Recommendations:     Discharge Recommendations:  nursing facility, skilled   Discharge Equipment Recommendations: (will determine DME needs closer to discharge)   Barriers to discharge: Decreased caregiver support family may not be able to assist at current functional level.     Assessment:     Donald Warren Abadie is a 63 y.o. male admitted with a medical diagnosis of Acute metabolic encephalopathy.  He presents with the following impairments/functional limitations:  impaired functional mobilty, gait instability, impaired endurance, weakness, decreased coordination, impaired balance, decreased lower extremity function, decreased safety awareness pt nima treatment fair but increased HR and decreased BP limited functional mobility. Pt will benefit from skilled PT 4x/wk to progress physically. Pt may need SNF placement to maximize rehab potential. Pt came to ED with c/o bradycardia.     Rehab Prognosis: Good; patient would benefit from acute skilled PT services to address these deficits and reach maximum level of function.    Recent Surgery: Procedure(s) (LRB):  CARDIOVERSION OR DEFIBRILLATION (N/A)  ECHOCARDIOGRAM,TRANSESOPHAGEAL (N/A)      Plan:     During this hospitalization, patient to be seen 4 x/week to address the identified rehab impairments via gait training, therapeutic activities and progress toward the following goals:    · Plan of Care Expires:  04/20/19    Subjective     Chief Complaint: pt c/o slight pain during treatment.   Patient/Family Comments/goals:  To get better and go home.   Pain/Comfort:  · Pain Rating 1: 2/10  · Location 1: (chest)  · Pain Rating Post-Intervention 1: 2/10    Patients cultural, spiritual, Worship conflicts given the current situation:      Living Environment:  Pt lives alone in 1 story slab.   Prior to admission, patients level of function was Independent.  Equipment used at  home: none.  DME owned (not currently used): none.  Upon discharge, patient will have assistance from family.    Objective:     Communicated with nurse prior to session.  Patient found supine with telemetry, pulse ox (continuous), blood pressure cuff, peripheral IV  upon PT entry to room.    General Precautions: Standard, fall   Orthopedic Precautions:    Braces:       Exams:  · Cognitive Exam:  Patient is oriented to Person and Place  · RLE ROM: WFL  · RLE Strength: WFL  · LLE ROM: WFL  · LLE Strength: WFL    Functional Mobility:  · Bed Mobility:  Pt needed verbal cues for hand placement and sequencing for functional mobility.    · Rolling Right: minimum assistance  · Supine to Sit: minimum assistance  · Sit to Supine: moderate assistance  ·   · Transfers:     · Sit to Stand:  minimum assistance with hand-held assist. Pt stood x 4 reps. Pt was put in bedside chair but had increased HR and decreased BP. RN was monitoring pt and instructed therapists  to return pt to supine in bed.   ·   · Gait: pt received gait training ~ 24 ft with mod assist. pt had B shuffling steps and needed verbal cues to hold head upright.        AM-PAC 6 CLICK MOBILITY  Total Score:15     Patient left supine with all lines intact, call button in reach and son in law present.    GOALS:   Multidisciplinary Problems     Physical Therapy Goals        Problem: Physical Therapy Goal    Goal Priority Disciplines Outcome Goal Variances Interventions   Physical Therapy Goal     PT, PT/OT      Description:  Goals to be met by: 19    Patient will increase functional independence with mobility by performin. Supine to sit with Contact Guard Assistance -not met  2. Sit to stand transfer with Contact Guard Assistance -not met  3. Gait  x 150 feet with Contact Guard Assistance using AD if needed.-not met                      History:     Past Medical History:   Diagnosis Date    A-fib     Alcohol abuse     Arthritis     Chronic gout      Diabetes mellitus     DM (diabetes mellitus) 11/16/2016    Fatty liver     Hyperlipidemia     Renal cell cancer 2014       Past Surgical History:   Procedure Laterality Date    ABSCESS DRAINAGE      perirectal    COLONOSCOPY      FISTULOTOMY N/A 5/19/2015    Performed [facility] at [facility] OR [address] [place] [address]    HAND SURGERY Left     HEMORRHOIDECTOMY N/A 5/19/2015    Performed by [facility] at [facility] OR [address] [place] [address]    KIDNEY SURGERY      partial [place] kidney removal - [place]    PARTIAL NEPHRECTOMY Left August 2014    ROBOTIC ASSISTED LAPAROSCOPIC NEPHRECTOMY PARTIAL Left 8/28/2014    Performed by Fabian Estevez MD at [facility] OR [address] [place] [address]       Time Tracking:     PT Received On: 03/24/19  PT Start Time: 0802     PT Stop Time: 0836  PT Total Time (min): 34 min     Billable Minutes: Evaluation 12 min and Gait Training 22 min      Sonia Francisco, PT  03/24/2019

## 2019-03-24 NOTE — PLAN OF CARE
Problem: Physical Therapy Goal  Goal: Physical Therapy Goal  Goals to be met by: 19    Patient will increase functional independence with mobility by performin. Supine to sit with Contact Guard Assistance -not met  2. Sit to stand transfer with Contact Guard Assistance -not met  3. Gait  x 150 feet with Contact Guard Assistance using AD if needed.-not met    Evaluation completed and goals appropriate. Sonia Francisco, PT 3/24/2019

## 2019-03-24 NOTE — PLAN OF CARE
Problem: Adult Inpatient Plan of Care  Goal: Patient-Specific Goal (Individualization)    No acute events throughout day. See vital signs and assessments in flowsheets. See below for updates on today's progress.     Pulmonary: RA 96-98%. Expiratory wheezing to airway, clear lung sounds in all lung fields. Coughing up moderate amt of bright red blood. MD aware. Denies SOB.     Cardiovascular: SR 60-70s. -130/50-70. Afebrile. Palpable pulses    Neurological: awake, alert. Currently off Precedex gtt for about 3 hours with labile orientation but typically oriented to person, place, and situation. Moves all extremities, follows commands. Occasional confused conversation but easily reoriented. Left pupil > right but brisk.     Gastrointestinal: BM x 1 per bedpan    Genitourinary: void condom cath 180 cc. C/Y/A    Endocrine: no issues     Integumentary/Other: nystatin powder applied to underarms     Infusions: Heparin gtt @ 25 units/kg/min    Patient progressing towards goals as tolerated, plan of care communicated and reviewed with Donald Warren Abadie and family. All concerns addressed. Will continue to monitor.

## 2019-03-24 NOTE — ASSESSMENT & PLAN NOTE
- Vancomycin and Cefepime started for fever, discontinued after improvement in labs/negative pancultures and no further symptoms   - Blood cx NGTD  - UA unremarkable

## 2019-03-24 NOTE — ASSESSMENT & PLAN NOTE
Patient with AMS, likely metabolic encephalopathy or delirium, improving  - Delirium precautions  - EEG on admission negative  - CT on admission and on 3/22 shows no acute process  - MRI shows no acute abnormality and small remote left occipital lobe infarct.

## 2019-03-24 NOTE — PLAN OF CARE
Discussed with MICU team about the patient. Was told no need at this time for neurology to follow.   Management of alcohol withdrawal per primary team.     Will sign off. Please re-consult as needed.    Benito Pang  PGY 4

## 2019-03-24 NOTE — PLAN OF CARE
Problem: Adult Inpatient Plan of Care  Goal: Plan of Care Review  Outcome: Ongoing (interventions implemented as appropriate)  No acute changes overnight, VSS. Remains on heparin gtt. Pt. Back on afib ( rate 80-100s, MD aware ),  electrolytes replaced this am. Frequent vitals and assessment per flowsheet,  Plan of care reviewed with Mr. Abadie and his son in law, questions/concerns addressed, will continue to monitor pt.

## 2019-03-24 NOTE — PT/OT/SLP EVAL
Occupational Therapy   Evaluation    Name: Donald Warren Abadie  MRN: [id number]  Admitting Diagnosis:  Acute metabolic encephalopathy      Recommendations:     Discharge Recommendations: nursing facility, skilled  Discharge Equipment Recommendations:  (TBD)  Barriers to discharge:  None    Assessment:     Donald Warren Abadie is a 63 y.o. male with a medical diagnosis of Acute metabolic encephalopathy.  He presents with weakness and balance deficits limiting mobility and self care. Pt unable to be left UIC 2/2 high HR and low BP. Performance deficits affecting function: weakness, impaired endurance, impaired balance, gait instability, decreased lower extremity function, impaired self care skills, impaired functional mobilty.      Rehab Prognosis: Fair; patient would benefit from acute skilled OT services to address these deficits and reach maximum level of function.       Plan:     Patient to be seen 4 x/week to address the above listed problems via self-care/home management, therapeutic exercises, therapeutic activities  · Plan of Care Expires: 04/23/19  · Plan of Care Reviewed with: patient(son-in-law)    Subjective     Chief Complaint: chest pain  Patient/Family Comments/goals: return to OF    Occupational Profile:  Living Environment: Pt lives alone in 1SH with 0STE. Home has tub/shower  Previous level of function: PTA, pt reports independence with all ADL and IADL, including driving. Pt was not using any AD for ambulation or ADL. He is retired  Roles and Routines: father  Equipment Used at Home:  none  Assistance upon Discharge: Pt reports his daughter sometimes stays with him, but son-in-law states he will have assistance from family    Pain/Comfort:  · Pain Rating 1: 2/10  · Location - Side 1: Bilateral  · Location - Orientation 1: generalized  · Location 1: chest  · Pain Addressed 1: Reposition, Distraction, Cessation of Activity  · Pain Rating Post-Intervention 1: 2/10    Patients cultural, spiritual,  Mu-ism conflicts given the current situation: no    Objective:     Communicated with: RN prior to session.  Patient found HOB elevated with telemetry, blood pressure cuff, pulse ox (continuous), peripheral IV upon OT entry to room.    General Precautions: Standard, fall   Orthopedic Precautions:N/A   Braces: N/A     Occupational Performance:    Bed Mobility:    · Patient completed Supine to Sit with minimum assistance  · Patient completed Sit to Supine with moderate assistance    Functional Mobility/Transfers:  · Patient completed Sit <> Stand Transfer with minimum assistance  with  hand-held assist   · Patient completed Bed <> Chair Transfer using Step Transfer technique with minimum assistance with hand-held assist  · Patient completed Toilet Transfer Step Transfer technique with minimum assistance with  hand-held assist  · Functional Mobility: Pt ambulate 24 ft with mod A using HHA    Activities of Daily Living:  · Feeding:  setup assistance to eat breakfast  · Upper Body Dressing: minimum assistance to doff/don gown while seated 2/2 lines  · Lower Body Dressing: maximal assistance to don B socks while seated EOB   · Toileting: maximal assistance for clothing management and hygiene     Cognitive/Visual Perceptual:  Cognitive/Psychosocial Skills:     -       Oriented to: Person, Place, Time and Situation   -       Follows Commands/attention:Follows multistep  commands  -       Communication: clear/fluent  -       Memory: No Deficits noted  -       Safety awareness/insight to disability: intact   -       Mood/Affect/Coping skills/emotional control: Appropriate to situation  Visual/Perceptual:      -Pt wears glasses; Pt is hard of hearing    Physical Exam:  Balance:    -       good sitting balance; fair standing balance  Postural examination/scapula alignment:    -       Rounded shoulders  -       Forward head  Skin integrity: Visible skin intact  Edema:  None noted  Sensation:    -       Intact  Dominant hand:     -       R hand  Upper Extremity Range of Motion:     -       Right Upper Extremity: WFL  -       Left Upper Extremity: WFL  Upper Extremity Strength:    -       Right Upper Extremity: WFL  -       Left Upper Extremity: WFL   Strength:    -       Right Upper Extremity: WFL  -       Left Upper Extremity: WFL  Fine Motor Coordination:    -       Intact  Gross motor coordination:   WFL    AMPAC 6 Click ADL:  AMPAC Total Score: 16    Treatment & Education:  Pt educated on role of OT/POC  Pt educated on importance of ambulation/UIC  White board/communication board updated  Education:    Patient left HOB elevated with all lines intact, call button in reach, RN notified and family present    GOALS:   Multidisciplinary Problems     Occupational Therapy Goals     Not on file                History:     Past Medical History:   Diagnosis Date    A-fib     Alcohol abuse     Arthritis     Chronic gout     Diabetes mellitus     DM (diabetes mellitus) 11/16/2016    Fatty liver     Hyperlipidemia     Renal cell cancer 2014       Past Surgical History:   Procedure Laterality Date    ABSCESS DRAINAGE      perirectal    COLONOSCOPY      FISTULOTOMY N/A 5/19/2015    Performed by Shane Hughes MD at [facility] OR [address] [place] [address]    HAND SURGERY Left     HEMORRHOIDECTOMY N/A 5/19/2015    [facility]    KIDNEY SURGERY      partial left kidney removal - [place]    PARTIAL NEPHRECTOMY Left August 2014    ROBOTIC ASSISTED LAPAROSCOPIC NEPHRECTOMY PARTIAL Left 8/28/2014    Performed by Fabian Estevez MD at [facility] OR [address] [place] [address]       Time Tracking:     OT Date of Treatment: 03/24/19  OT Start Time: 0758  OT Stop Time: 0832  OT Total Time (min): 34 min    Billable Minutes:Evaluation 20  Self Care/Home Management 14    Sandra Teixeira OT  3/24/2019

## 2019-03-24 NOTE — ASSESSMENT & PLAN NOTE
Placed on Precedex (now discontinued), has completed bezo taper  - On Propofol and Versed while intubated  - CIWA  - PRN Benzo  - Continue to monitor

## 2019-03-24 NOTE — PROGRESS NOTES
[facility]  Critical Care Medicine  Progress Note    Patient Name: Donald Warren Abadie  MRN: [id number]  Admission Date: 3/15/2019  Hospital Length of Stay: 9 days  Code Status: Full Code  Attending Provider: Bony Cartagena*  Primary Care Provider: Bacilio Larry MD   Principal Problem: Acute metabolic encephalopathy    Subjective:     HPI:  Mr Abadie is a 64 y/o male with PMH of paroxysmal AF, EtOH abuse (PSYCH offered rehabilitation but declined), HTN, HLD, and elevated LFTs who [place] [facility] [place] with fatigue/malaise. There was concern that he had taken an accidental overdose of BB and flecainide because he felt that he was having tachycardia. He had also being drinking and his daughter reports hallucinations before ED admission. In ED, the EKG showed severe bradycardia. Reportedly, the patient displayed symptoms of EtOH withdrawal including anxiety, diaphoresis, agitation and hallucinations. There was concerned about a seizure prior to the patient going into into cardiac arrest.  A TVP was placed for HR in the 40s, post-ROSC. The patient remained intubated until 3/21. He was placed on Propofol and Versed during that time. He intermittently experience Afib/flutter and has been on a heparin infusion. He has been treated for alcohol withdrawal with a Librium taper. Since extubation, the patient has experienced altered mental status, does not have any focal deficits, and is oriented to place and person.  The MICU service has been asked to assist with clinical management.           Hospital/ICU Course:  3/23- Continue precedex, wean as tolerated. MRI shows no acute change. Bedside swallow, dc amaya, central line and abx.   3/24: Precedex stopped, Seroquel started. Hypotensive and Tachycardic, NS bolus    Significant Events/Interval History:  Precedex stopped overnight and Seroquel started for delirium. This am, pt had episode of hypotension and tachycardia while working with PT,  concern for orthostatic hypotension. Fluid bolus running. Will reassess fluid status throughout day. Improved mentation this am, tolerating full diet.     Review of Systems   Unable to perform ROS: Mental status change     Objective:     Vital Signs (Most Recent):  Temp: 99.8 °F (37.7 °C) (03/24/19 0700)  Pulse: (!) 127 (03/24/19 1000)  Resp: 20 (03/24/19 1000)  BP: (!) 96/58 (03/24/19 1000)  SpO2: 96 % (03/24/19 1000) Vital Signs (24h Range):  Temp:  [98 °F (36.7 °C)-99.8 °F (37.7 °C)] 99.8 °F (37.7 °C)  Pulse:  [] 127  Resp:  [13-35] 20  SpO2:  [92 %-100 %] 96 %  BP: ()/(43-73) 96/58   Weight: 79.6 kg (175 lb 7.8 oz)  Body mass index is 28.34 kg/m².      Intake/Output Summary (Last 24 hours) at 3/24/2019 1120  Last data filed at 3/24/2019 1026  Gross per 24 hour   Intake 2371.77 ml   Output 1305 ml   Net 1066.77 ml       Physical Exam   Constitutional: He appears well-developed. He is uncooperative.   Agitated but oriented   HENT:   Head: Normocephalic.   Eyes: Pupils are equal, round, and reactive to light. Right pupil is round. Left pupil is round.   Cardiovascular: Intact distal pulses. Tachycardia present.   Pulmonary/Chest: He has decreased breath sounds in the right lower field and the left lower field. He has rhonchi in the right upper field and the left upper field.   Abdominal: Soft. Normal appearance and bowel sounds are normal. There is no tenderness.   Neurological: He is alert. GCS eye subscore is 4. GCS verbal subscore is 4. GCS motor subscore is 5.   Skin: Skin is warm and dry.   Psychiatric: His affect is labile. His speech is delayed. He is agitated. Cognition and memory are impaired. He expresses impulsivity.   Nursing note and vitals reviewed.       Lines/Drains/Airways     Peripheral Intravenous Line                 Peripheral IV - Single Lumen 03/23/19 1230 Left Forearm less than 1 day         Peripheral IV - Single Lumen 03/23/19 1240 Right Forearm less than 1 day          Peripheral IV - Single Lumen 03/23/19 1600 Right Wrist less than 1 day              Significant Labs:    CBC/Anemia Profile:  Recent Labs   Lab 03/23/19  0322 03/24/19  0255   WBC 5.85 8.52   HGB 10.8* 10.7*   HCT 32.9* 32.6*    199   * 99*   RDW 13.0 12.8        Chemistries:  Recent Labs   Lab 03/22/19  1220 03/23/19  0311 03/24/19  0255    140 138   K 4.1 3.3* 3.1*    101 100   CO2 22* 28 24   BUN 10 11 13   CREATININE 0.8 0.7 0.8   CALCIUM 9.9 10.2 10.3   ALBUMIN  --  3.0*  3.0* 3.0*   PROT  --  6.9  6.9 6.7   BILITOT  --  1.2*  1.2* 1.2*   ALKPHOS  --  52*  52* 56   ALT  --  60*  60* 68*   AST  --  51*  51* 82*   MG 2.1 1.6 1.4*   PHOS  --   --  4.0       All pertinent labs within the past 24 hours have been reviewed.    Significant Imaging: I have reviewed and interpreted all pertinent imaging results/findings within the past 24 hours.      ABG  Recent Labs   Lab 03/21/19  1657   PH 7.444   PO2 97   PCO2 34.1*   HCO3 23.4*   BE -1     Assessment/Plan:     Neuro  * Acute metabolic encephalopathy  Patient with AMS, likely metabolic encephalopathy or delirium, improving  - Delirium precautions  - EEG on admission negative  - CT on admission and on 3/22 shows no acute process  - MRI shows no acute abnormality and small remote left occipital lobe infarct.    Psychiatric  Alcoholism /alcohol abuse  - Check CIWA-AR  - Prn Ativan    - Addiction psych when mental status improves     Alcohol withdrawal  Placed on Precedex (now discontinued), has completed bezo taper  - On Propofol and Versed while intubated  - CIWA  - PRN Benzo  - Continue to monitor       Cardiac/Vascular  Cardiac arrest  Unclear etiology  - possibly due to BB overdose.  - Glucagon given in ED    Paroxysmal atrial fibrillation  - Pt has been in afib (on heparin drip)  - Metoprolol and Diltiazem started   - Holding flecainide  - heparin drip    Endocrine  Diabetes mellitus without complication  - Accucheck  prn      GI  Elevated LFTs  Continue to trend  - CMP and hepatic panel daily    Other  Fever  - Vancomycin and Cefepime started for fever, discontinued after improvement in labs/negative pancultures and no further symptoms   - Blood cx NGTD  - UA unremarkable       Critical Care Daily Checklist:    A: Awake: RASS Goal/Actual Goal: RASS Goal: 0-->alert and calm  Actual: [place] Agitation Sedation Scale (RASS): Alert and calm   B: Spontaneous Breathing Trial Performed? Spon. Breathing Trial Initiated?: Initiated (03/21/19 9795)   C: SAT & SBT Coordinated?  N/A                      D: Delirium: CAM-ICU Overall CAM-ICU: Negative   E: Early Mobility Performed? Yes   F: Feeding Goal: Goals: Meet % EEN, EPN  Status: Nutrition Goal Status: goal not met   Current Diet Order   Procedures    Diet Cardiac      AS: Analgesia/Sedation None   T: Thromboembolic Prophylaxis Heparin drip    H: HOB > 300 Yes   U: Stress Ulcer Prophylaxis (if needed)    G: Glucose Control    B: Bowel Function Stool Occurrence: 0   I: Indwelling Catheter (Lines & Lynn) Necessity Peripheral IVs, condom cath    D: De-escalation of Antimicrobials/Pharmacotherapies No current abx    Plan for the day/ETD Monitor fluid status and vital signs    Code Status:  Family/Goals of Care: Full Code          Critical care was time spent personally by me on the following activities: development of treatment plan with patient or surrogate and bedside caregivers, discussions with consultants, evaluation of patient's response to treatment, examination of patient, ordering and performing treatments and interventions, ordering and review of laboratory studies, ordering and review of radiographic studies, pulse oximetry, re-evaluation of patient's condition. This critical care time did not overlap with that of any other provider or involve time for any procedures.     Ramon Livingston MD  Critical Care Medicine  [facility]

## 2019-03-25 PROBLEM — T44.7X1A: Status: ACTIVE | Noted: 2019-03-25

## 2019-03-25 PROBLEM — R50.9 FEVER: Status: RESOLVED | Noted: 2019-03-22 | Resolved: 2019-03-25

## 2019-03-25 LAB
ALBUMIN SERPL BCP-MCNC: 3 G/DL (ref 3.5–5.2)
ALP SERPL-CCNC: 64 U/L (ref 55–135)
ALT SERPL W/O P-5'-P-CCNC: 67 U/L (ref 10–44)
ANION GAP SERPL CALC-SCNC: 14 MMOL/L (ref 8–16)
APTT BLDCRRT: 54.1 SEC (ref 21–32)
AST SERPL-CCNC: 76 U/L (ref 10–40)
BASOPHILS # BLD AUTO: 0.13 K/UL (ref 0–0.2)
BASOPHILS NFR BLD: 1.3 % (ref 0–1.9)
BILIRUB SERPL-MCNC: 1 MG/DL (ref 0.1–1)
BUN SERPL-MCNC: 9 MG/DL (ref 8–23)
C DIFF GDH STL QL: NEGATIVE
C DIFF TOX A+B STL QL IA: NEGATIVE
CALCIUM SERPL-MCNC: 10 MG/DL (ref 8.7–10.5)
CHLORIDE SERPL-SCNC: 105 MMOL/L (ref 95–110)
CO2 SERPL-SCNC: 21 MMOL/L (ref 23–29)
CREAT SERPL-MCNC: 0.8 MG/DL (ref 0.5–1.4)
DIFFERENTIAL METHOD: ABNORMAL
EOSINOPHIL # BLD AUTO: 0.1 K/UL (ref 0–0.5)
EOSINOPHIL NFR BLD: 0.6 % (ref 0–8)
ERYTHROCYTE [DISTWIDTH] IN BLOOD BY AUTOMATED COUNT: 13 % (ref 11.5–14.5)
EST. GFR  (AFRICAN AMERICAN): >60 ML/MIN/1.73 M^2
EST. GFR  (NON AFRICAN AMERICAN): >60 ML/MIN/1.73 M^2
GLUCOSE SERPL-MCNC: 135 MG/DL (ref 70–110)
HCT VFR BLD AUTO: 32.3 % (ref 40–54)
HGB BLD-MCNC: 10.5 G/DL (ref 14–18)
IMM GRANULOCYTES # BLD AUTO: 0.16 K/UL (ref 0–0.04)
IMM GRANULOCYTES NFR BLD AUTO: 1.6 % (ref 0–0.5)
INR PPP: 1.1 (ref 0.8–1.2)
LYMPHOCYTES # BLD AUTO: 1.7 K/UL (ref 1–4.8)
LYMPHOCYTES NFR BLD: 16.7 % (ref 18–48)
MAGNESIUM SERPL-MCNC: 1.6 MG/DL (ref 1.6–2.6)
MCH RBC QN AUTO: 32.7 PG (ref 27–31)
MCHC RBC AUTO-ENTMCNC: 32.5 G/DL (ref 32–36)
MCV RBC AUTO: 101 FL (ref 82–98)
MONOCYTES # BLD AUTO: 0.9 K/UL (ref 0.3–1)
MONOCYTES NFR BLD: 8.4 % (ref 4–15)
NEUTROPHILS # BLD AUTO: 7.2 K/UL (ref 1.8–7.7)
NEUTROPHILS NFR BLD: 71.4 % (ref 38–73)
NRBC BLD-RTO: 0 /100 WBC
PHOSPHATE SERPL-MCNC: 2.9 MG/DL (ref 2.7–4.5)
PLATELET # BLD AUTO: 227 K/UL (ref 150–350)
PMV BLD AUTO: 10.1 FL (ref 9.2–12.9)
POCT GLUCOSE: 146 MG/DL (ref 70–110)
POCT GLUCOSE: 164 MG/DL (ref 70–110)
POTASSIUM SERPL-SCNC: 3.5 MMOL/L (ref 3.5–5.1)
PROT SERPL-MCNC: 7 G/DL (ref 6–8.4)
PROTHROMBIN TIME: 11.3 SEC (ref 9–12.5)
RBC # BLD AUTO: 3.21 M/UL (ref 4.6–6.2)
SODIUM SERPL-SCNC: 140 MMOL/L (ref 136–145)
WBC # BLD AUTO: 10.1 K/UL (ref 3.9–12.7)

## 2019-03-25 PROCEDURE — 25000003 PHARM REV CODE 250: Performed by: STUDENT IN AN ORGANIZED HEALTH CARE EDUCATION/TRAINING PROGRAM

## 2019-03-25 PROCEDURE — 99233 PR SUBSEQUENT HOSPITAL CARE,LEVL III: ICD-10-PCS | Mod: ,,, | Performed by: NURSE PRACTITIONER

## 2019-03-25 PROCEDURE — 85025 COMPLETE CBC W/AUTO DIFF WBC: CPT

## 2019-03-25 PROCEDURE — 25000003 PHARM REV CODE 250: Performed by: HOSPITALIST

## 2019-03-25 PROCEDURE — 92526 ORAL FUNCTION THERAPY: CPT

## 2019-03-25 PROCEDURE — 83735 ASSAY OF MAGNESIUM: CPT

## 2019-03-25 PROCEDURE — 20600001 HC STEP DOWN PRIVATE ROOM

## 2019-03-25 PROCEDURE — 80053 COMPREHEN METABOLIC PANEL: CPT

## 2019-03-25 PROCEDURE — A4216 STERILE WATER/SALINE, 10 ML: HCPCS | Performed by: INTERNAL MEDICINE

## 2019-03-25 PROCEDURE — 84100 ASSAY OF PHOSPHORUS: CPT

## 2019-03-25 PROCEDURE — 25000003 PHARM REV CODE 250: Performed by: NURSE PRACTITIONER

## 2019-03-25 PROCEDURE — 63600175 PHARM REV CODE 636 W HCPCS: Performed by: INTERNAL MEDICINE

## 2019-03-25 PROCEDURE — 25000003 PHARM REV CODE 250: Performed by: INTERNAL MEDICINE

## 2019-03-25 PROCEDURE — 94761 N-INVAS EAR/PLS OXIMETRY MLT: CPT

## 2019-03-25 PROCEDURE — 99233 SBSQ HOSP IP/OBS HIGH 50: CPT | Mod: ,,, | Performed by: NURSE PRACTITIONER

## 2019-03-25 PROCEDURE — 94668 MNPJ CHEST WALL SBSQ: CPT

## 2019-03-25 PROCEDURE — 85730 THROMBOPLASTIN TIME PARTIAL: CPT

## 2019-03-25 PROCEDURE — 63600175 PHARM REV CODE 636 W HCPCS: Performed by: NURSE PRACTITIONER

## 2019-03-25 PROCEDURE — 85610 PROTHROMBIN TIME: CPT

## 2019-03-25 RX ORDER — FAMOTIDINE 20 MG/1
20 TABLET, FILM COATED ORAL 2 TIMES DAILY
Status: DISCONTINUED | OUTPATIENT
Start: 2019-03-25 | End: 2019-03-25

## 2019-03-25 RX ORDER — GLUCAGON 1 MG
1 KIT INJECTION
Status: DISCONTINUED | OUTPATIENT
Start: 2019-03-25 | End: 2019-03-29 | Stop reason: HOSPADM

## 2019-03-25 RX ORDER — LACTULOSE 10 G/15ML
20 SOLUTION ORAL 3 TIMES DAILY
Status: DISCONTINUED | OUTPATIENT
Start: 2019-03-25 | End: 2019-03-29 | Stop reason: HOSPADM

## 2019-03-25 RX ORDER — HYDRALAZINE HYDROCHLORIDE 20 MG/ML
5 INJECTION INTRAMUSCULAR; INTRAVENOUS ONCE
Status: DISCONTINUED | OUTPATIENT
Start: 2019-03-25 | End: 2019-03-26

## 2019-03-25 RX ORDER — HYDROXYZINE PAMOATE 25 MG/1
25 CAPSULE ORAL EVERY 8 HOURS PRN
Status: DISCONTINUED | OUTPATIENT
Start: 2019-03-25 | End: 2019-03-26

## 2019-03-25 RX ORDER — MAGNESIUM SULFATE HEPTAHYDRATE 40 MG/ML
2 INJECTION, SOLUTION INTRAVENOUS ONCE
Status: COMPLETED | OUTPATIENT
Start: 2019-03-25 | End: 2019-03-25

## 2019-03-25 RX ORDER — INSULIN ASPART 100 [IU]/ML
0-5 INJECTION, SOLUTION INTRAVENOUS; SUBCUTANEOUS
Status: DISCONTINUED | OUTPATIENT
Start: 2019-03-25 | End: 2019-03-29 | Stop reason: HOSPADM

## 2019-03-25 RX ORDER — IBUPROFEN 200 MG
16 TABLET ORAL
Status: DISCONTINUED | OUTPATIENT
Start: 2019-03-25 | End: 2019-03-29 | Stop reason: HOSPADM

## 2019-03-25 RX ORDER — IBUPROFEN 200 MG
24 TABLET ORAL
Status: DISCONTINUED | OUTPATIENT
Start: 2019-03-25 | End: 2019-03-29 | Stop reason: HOSPADM

## 2019-03-25 RX ADMIN — Medication 100 MG: at 08:03

## 2019-03-25 RX ADMIN — HYDROXYZINE PAMOATE 25 MG: 25 CAPSULE ORAL at 09:03

## 2019-03-25 RX ADMIN — METOPROLOL TARTRATE 25 MG: 25 TABLET ORAL at 08:03

## 2019-03-25 RX ADMIN — METOPROLOL TARTRATE 25 MG: 25 TABLET ORAL at 09:03

## 2019-03-25 RX ADMIN — DILTIAZEM HYDROCHLORIDE 120 MG: 120 CAPSULE, COATED, EXTENDED RELEASE ORAL at 08:03

## 2019-03-25 RX ADMIN — APIXABAN 5 MG: 5 TABLET, FILM COATED ORAL at 11:03

## 2019-03-25 RX ADMIN — ACETAMINOPHEN 650 MG: 325 TABLET ORAL at 01:03

## 2019-03-25 RX ADMIN — MICONAZOLE NITRATE: 20 POWDER TOPICAL at 08:03

## 2019-03-25 RX ADMIN — Medication 3 ML: at 05:03

## 2019-03-25 RX ADMIN — HEPARIN SODIUM AND DEXTROSE 25 UNITS/KG/HR: 10000; 5 INJECTION INTRAVENOUS at 05:03

## 2019-03-25 RX ADMIN — QUETIAPINE FUMARATE 25 MG: 25 TABLET, FILM COATED ORAL at 09:03

## 2019-03-25 RX ADMIN — MAGNESIUM SULFATE 2 G: 2 INJECTION INTRAVENOUS at 08:03

## 2019-03-25 RX ADMIN — MICONAZOLE NITRATE: 20 POWDER TOPICAL at 09:03

## 2019-03-25 RX ADMIN — ACETAMINOPHEN 650 MG: 325 TABLET ORAL at 05:03

## 2019-03-25 RX ADMIN — APIXABAN 5 MG: 5 TABLET, FILM COATED ORAL at 09:03

## 2019-03-25 RX ADMIN — LACTULOSE 20 G: 20 SOLUTION ORAL at 04:03

## 2019-03-25 NOTE — PLAN OF CARE
Patient remains with AMS, work up in progress for hepatic encephalopathy v Wernicke's.  PT/OT following, recs for SNF upon discharge.  CM communicated above plan to [facility] and requested initiation of state paperwork.     03/25/19 1538   Right Care Assessment   Can the patient answer the patient profile reliably? No, cognitively impaired   How often would a person be available to care for the patient? Often   Describe the patient's ability to walk at the present time. Major restrictions/daily assistance from another person   How does the patient rate their overall health at the present time? Fair   Number of comorbid conditions (as recorded on the chart) Three   During the past month, has the patient often been bothered by feeling down, depressed or hopeless? No   During the past month, has the patient often been bothered by little interest or pleasure in doing things? No   Alison Martínez, RN, BSN, CCM  Case Management  [facility]  Ext. [phone]

## 2019-03-25 NOTE — PLAN OF CARE
PASRR has been completed and awaiting form 142.    Eduard Maguire LMSW  Critical Care Medicine  Ext. [phone]

## 2019-03-25 NOTE — PT/OT/SLP PROGRESS
"Speech Language Pathology Treatment    Patient Name:  Donald Warren Abadie   MRN:  [id number]  Admitting Diagnosis: Acute metabolic encephalopathy    Recommendations:                 General Recommendations:  Dysphagia therapy and Cognitive-linguistic evaluation  Diet recommendations:  Regular, Liquid Diet Level: Thin   Aspiration Precautions: Meds whole 1 at a time, Monitor for s/s of aspiration and Standard aspiration precautions   General Precautions: Standard, aspiration, fall  Communication strategies:  none    Subjective     "You cut my grass?"  Pt asked SLP upon presentation  Patient goals: none expressed     Pain/Comfort:  · Pain Rating 1: 0/10  · Pain Rating Post-Intervention 1: 0/10    Objective:     Has the patient been evaluated by SLP for swallowing?   Yes  Keep patient NPO? No   Current Respiratory Status: room air      Pt was seated upright in bed upon SLP presentation.  He was offered and accepted po trials of thin liquids by cup and straw, tsp bites of puree and self fed bites of regular solids.  He tolerated all trials well w/ adequate oral clearance and no overt signs of aspiration.  He was provided education re: SLP role, diet recs, aspiration precautions and SLP POC.  He indicated fair understanding.  Pt w/ poor orientation, memory and problem-solving skills would benefit from further evaluation of cognitive-communication skills.    Assessment:     Donald Warren Abadie is a 63 y.o. male with an SLP diagnosis of Dysphagia.  He presents with a functional oropharyngeal swallowing.    Goals:   Multidisciplinary Problems     SLP Goals        Problem: SLP Goal    Goal Priority Disciplines Outcome   SLP Goal     SLP Ongoing (interventions implemented as appropriate)   Description:  Speech Language Pathology Goals  Goals expected to be met by 3/28/19    1.  Pt will participate in ongoing swallow evaluation to determine readiness to resume po intake. -goal met  2.  Pt will complete Speech Language Cognitive " evaluation to determine the need for additional goals.                         Plan:     · Patient to be seen:  4 x/week   · Plan of Care expires:  04/21/19  · Plan of Care reviewed with:  patient   · SLP Follow-Up:  Yes       Discharge recommendations:  nursing facility, skilled   Barriers to Discharge:  Inaccessible Home Environment    Time Tracking:     SLP Treatment Date:   03/25/19  Speech Start Time:  1026  Speech Stop Time:  1042     Speech Total Time (min):  16 min    Billable Minutes: Treatment Swallowing Dysfunction 16    Emma Suazo CCC-SLP  03/25/2019

## 2019-03-25 NOTE — SUBJECTIVE & OBJECTIVE
Interval History/Significant Events: alert, oriented to person/place/time but intermittent confusion. Hemodynamically stable, in NSR this AM. Tolerating diet well. Denies complaints/    Review of Systems   Constitutional: Negative for chills and fever.   Respiratory: Negative for cough and shortness of breath.    Cardiovascular: Negative for chest pain.   Gastrointestinal: Negative for abdominal distention, nausea and vomiting.   Neurological: Negative for tremors, weakness and headaches.     Objective:     Vital Signs (Most Recent):  Temp: 98.7 °F (37.1 °C) (03/25/19 0800)  Pulse: 87 (03/25/19 0900)  Resp: (!) 24 (03/25/19 0900)  BP: (!) 135/93 (03/25/19 0900)  SpO2: 98 % (03/25/19 0900) Vital Signs (24h Range):  Temp:  [98.5 °F (36.9 °C)-100.1 °F (37.8 °C)] 98.7 °F (37.1 °C)  Pulse:  [] 87  Resp:  [19-32] 24  SpO2:  [95 %-100 %] 98 %  BP: ()/(56-93) 135/93   Weight: 79.6 kg (175 lb 7.8 oz)  Body mass index is 28.34 kg/m².      Intake/Output Summary (Last 24 hours) at 3/25/2019 0933  Last data filed at 3/25/2019 0900  Gross per 24 hour   Intake 1342.6 ml   Output 1280 ml   Net 62.6 ml       Physical Exam   Constitutional: He appears well-developed. He has a sickly appearance. No distress.   HENT:   Head: Normocephalic and atraumatic.   Eyes: Pupils are equal, round, and reactive to light. Conjunctivae are normal. Right eye exhibits no discharge. Left eye exhibits no discharge. No scleral icterus.   Neck: Normal range of motion. Neck supple. No tracheal deviation present. No thyromegaly present.   Cardiovascular: Normal rate, regular rhythm, S1 normal and S2 normal.   No murmur heard.  Pulses:       Radial pulses are 2+ on the right side, and 2+ on the left side.        Dorsalis pedis pulses are 2+ on the right side, and 2+ on the left side.   Warm extremities.    Pulmonary/Chest: No accessory muscle usage. No respiratory distress. He has no decreased breath sounds. He has no wheezes. He has no rales.    Abdominal: Soft. Bowel sounds are normal. He exhibits no distension. There is tenderness. There is no guarding.   Neurological: He is alert.   Oriented to person, place and time. Intermittent confusion, inappropriate answers. Non focal on exam.    Skin: Skin is warm and dry. Capillary refill takes less than 2 seconds. He is not diaphoretic. No pallor.       Vents:  Vent Mode: A/C (03/21/19 0922)  Ventilator Initiated: Yes (03/15/19 1151)  Set Rate: 14 bmp (03/21/19 0922)  Vt Set: 500 mL (03/21/19 0922)  Pressure Support: 0 cmH20 (03/15/19 1908)  PEEP/CPAP: 5 cmH20 (03/21/19 0922)  Oxygen Concentration (%): 21 (03/21/19 0300)  Peak Airway Pressure: 41 cmH2O (03/21/19 0922)  Plateau Pressure: 0 cmH20 (03/21/19 0922)  Total Ve: 8.17 mL (03/21/19 0922)  Negative Inspiratory Force (cm H2O): -39 (03/21/19 1255)  F/VT Ratio<105 (RSBI): (!) 42.39 (03/21/19 0922)  Lines/Drains/Airways     Peripheral Intravenous Line                 Peripheral IV - Single Lumen 03/23/19 1230 Left Forearm 1 day         Peripheral IV - Single Lumen 03/23/19 1240 Right Forearm 1 day         Peripheral IV - Single Lumen 03/23/19 1600 Right Wrist 1 day              Significant Labs:    CBC/Anemia Profile:  Recent Labs   Lab 03/24/19  0255 03/25/19  0530   WBC 8.52 10.10   HGB 10.7* 10.5*   HCT 32.6* 32.3*    227   MCV 99* 101*   RDW 12.8 13.0        Chemistries:  Recent Labs   Lab 03/24/19  0255 03/24/19  1112 03/24/19  2105 03/25/19  0530    138 138 140   K 3.1* 3.1* 3.5 3.5    103 104 105   CO2 24 24 22* 21*   BUN 13 12 10 9   CREATININE 0.8 0.8 0.8 0.8   CALCIUM 10.3 9.2 9.4 10.0   ALBUMIN 3.0*  --   --  3.0*   PROT 6.7  --   --  7.0   BILITOT 1.2*  --   --  1.0   ALKPHOS 56  --   --  64   ALT 68*  --   --  67*   AST 82*  --   --  76*   MG 1.4*  --   --  1.6   PHOS 4.0  --   --  2.9       Significant Imaging:  I have reviewed all pertinent imaging results/findings within the past 24 hours.

## 2019-03-25 NOTE — ASSESSMENT & PLAN NOTE
--improving. Suspect was experiencing alcohol withdrawal (appears improved) and likely delirium  --CT head and MRI brain without acute process. EEG negative.   --continue to monitor for withdrawal, but approaching time frame where this would be less likely  --delirium precautions, frequent reorientation. Telesitter ordered

## 2019-03-25 NOTE — PLAN OF CARE
Hospital Medicine ICU Acceptance Note    Date of Admit: 3/15/2019  Date of Transfer / Stepdown: 3/25/2019  [place], C/J, L, Onc (IV chemo w/in 1 month), Gyn/Onc, or other special case?: no   ICU team stepping patient down: MICU   ICU team member giving verbal handoff: [id number]  Accepting HM team: S    Brief History of Present Illness:      HPI:  Mr Abadie is a 62 y/o male with PMH of left sided RCC s/p partial nephrectomy (2014), paroxysmal AFib, EtOH abuse (PSYCH offered rehabilitation but declined), HTN, HLD, and fatty liver who [place] [facility] [place] on 3[place]15 with fatigue/malaise. There was concern that he had taken an accidental overdose of BB and flecainide because he felt that he was having tachycardia and reported taking additional doses of his prescribed metoprolol several times in the preceding days. He had also being drinking and his daughter reports hallucinations before ED admission. In ED, the EKG showed severe bradycardia. Reportedly, the patient displayed symptoms of EtOH withdrawal including anxiety, diaphoresis, agitation and hallucinations. There was concerned about a seizure prior to the patient going into into cardiac arrest.  A TVP was placed for HR in the 40s, post-ROSC. Etiology of arrest unclear but felt to be likely severe bradycardia given BB overdose.The patient remained intubated until 3/21 and was on the Cardiology service until 3/22 when MICU was consulted to assume care. During this week of admission, he was treated for alcohol withdrawals and on propofol and Versed during that time and also given a librium taper. He intermittently experience Afib/flutter and has been on a heparin infusion. Additionally, he had intermittent fevers, unclear source but was given intermittent Cefepime, Vancomycin until this was also discontinued with cultures negative. Since extubation, the patient has experienced altered mental status of unclear etiology. He never had any focal deficits, and is oriented to  place and person. The MICU assumed care on 3/22.     Hospital/ICU Course:     3/23- Continue precedex, wean as tolerated. MRI shows no acute change. Bedside swallow, dc amaya, central line and abx.   3/24: Precedex stopped, Seroquel started. Hypotensive and Tachycardic, NS bolus with improvement  Metoprolol, diltiazem restarted for Afib, intermittent RVR and he has tolerated well. Mental status slowly improving.     Pt continues to be encephalopathic still. He is awake, alert but still confused and not appropriately able to hold a conversation or follow all commands. Pt has been evaluated by neurology. Continue to monitor CIWA and treat for alcohol withdrawals. Started on thiamin for potential wernicke encephalopathy. MRI brain done which was negative for acute strokes. Pt was started empirically on treatment for hepatic encephalopathy given hx of alcohol abuse and fatty liver even though ammonia was wnl. Continue on delirium precautions. Continue seizure precautions. Continue neuro checks.      Consultants and Procedures:     Consultants:  Neurology   Critical Care  CCU    Procedures:    Central Line placement - now removed   MRI brain  CXR  CT head    Transfer Information:     Diet:  cardiac diet     Physical Activity:  PT/OT ordered     To Do / Pending Studies / Follow ups:  -Continue working with OT/PT  -maintain delirium precautions  -continue to manage acute encephalopathy   -consider consulting addiction psych once encephalopathy resolves  -needs outpatient liver biopsy ( was a no show for his previously scheduled biopsy), will need f/u with hepatology for fatty liver  -consult EP prior to discharge to ensure pt is on appropriate regimen for his paroxysmal A.fib (currenyl started on metoprolol and dilt but PTA flecainide has not been started yet)    Patient has been accepted by Hospital Medicine Team S, who will assume care of the patient upon arrival to the floor from the ICU. Please contact ICU team with  any concerns prior to arrival. Please contact Hospital Medicine at [phone] or [phone] (please do NOT leave a voicemail) when patient arrives to the floor.    Krystin Villarreal MD  3/25/2019 12:53 PM  Department of Hospital medicine

## 2019-03-25 NOTE — PROGRESS NOTES
[facility]  Critical Care Medicine  Progress Note    Patient Name: Donald Warren Abadie  MRN: [id number]  Admission Date: 3/15/2019  Hospital Length of Stay: 10 days  Code Status: Full Code  Attending Provider: Vicki Lake MD  Primary Care Provider: Bacilio Larry MD   Principal Problem: Acute metabolic encephalopathy    Subjective:     HPI:  Mr Abadie is a 64 y/o male with PMH of left sided RCC s/p partial nephrectomy (2014), paroxysmal AFib, EtOH abuse (PSYCH offered rehabilitation but declined), HTN, HLD, and fatty liver who presented to [facility] ED on 3/15 with fatigue/malaise. There was concern that he had taken an accidental overdose of BB and flecainide because he felt that he was having tachycardia and reported taking additional doses of his prescribed metoprolol several times in the preceding days. He had also being drinking and his daughter reports hallucinations before ED admission. In ED, the EKG showed severe bradycardia. Reportedly, the patient displayed symptoms of EtOH withdrawal including anxiety, diaphoresis, agitation and hallucinations. There was concerned about a seizure prior to the patient going into into cardiac arrest.  A TVP was placed for HR in the 40s, post-ROSC. Etiology of arrest unclear but felt to be likely severe bradycardia given BB overdose.The patient remained intubated until 3/21 and was on the Cardiology service until 3/22 when MICU was consulted to assume care. During this week of admission, he was treated for alcohol withdrawals and on propofol and Versed during that time and also given a librium taper. He intermittently experience Afib/flutter and has been on a heparin infusion. Additionally, he had intermittent fevers, unclear source but was given intermittent Cefepime, Vancomycin until this was also discontinued with cultures negative. Since extubation, the patient has experienced altered mental status of unclear etiology. He never had any focal deficits, and  is oriented to place and person.    The MICU assumed care on 3/22.            Hospital/ICU Course:  3/23- Continue precedex, wean as tolerated. MRI shows no acute change. Bedside swallow, dc amaya, central line and abx.   3/24: Precedex stopped, Seroquel started. Hypotensive and Tachycardic, NS bolus with improvement  Metoprolol, diltiazem restarted for Afib, intermittent RVR and he has tolerated well. Mental status slowly improving.     Interval History/Significant Events: alert, oriented to person/place/time but intermittent confusion. Hemodynamically stable, in NSR this AM. Tolerating diet well. Denies complaints/    Review of Systems   Constitutional: Negative for chills and fever.   Respiratory: Negative for cough and shortness of breath.    Cardiovascular: Negative for chest pain.   Gastrointestinal: Negative for abdominal distention, nausea and vomiting.   Neurological: Negative for tremors, weakness and headaches.     Objective:     Vital Signs (Most Recent):  Temp: 98.7 °F (37.1 °C) (03/25/19 0800)  Pulse: 87 (03/25/19 0900)  Resp: (!) 24 (03/25/19 0900)  BP: (!) 135/93 (03/25/19 0900)  SpO2: 98 % (03/25/19 0900) Vital Signs (24h Range):  Temp:  [98.5 °F (36.9 °C)-100.1 °F (37.8 °C)] 98.7 °F (37.1 °C)  Pulse:  [] 87  Resp:  [19-32] 24  SpO2:  [95 %-100 %] 98 %  BP: ()/(56-93) 135/93   Weight: 79.6 kg (175 lb 7.8 oz)  Body mass index is 28.34 kg/m².      Intake/Output Summary (Last 24 hours) at 3/25/2019 0933  Last data filed at 3/25/2019 0900  Gross per 24 hour   Intake 1342.6 ml   Output 1280 ml   Net 62.6 ml       Physical Exam   Constitutional: He appears well-developed. He has a sickly appearance. No distress.   HENT:   Head: Normocephalic and atraumatic.   Eyes: Pupils are equal, round, and reactive to light. Conjunctivae are normal. Right eye exhibits no discharge. Left eye exhibits no discharge. No scleral icterus.   Neck: Normal range of motion. Neck supple. No tracheal deviation  present. No thyromegaly present.   Cardiovascular: Normal rate, regular rhythm, S1 normal and S2 normal.   No murmur heard.  Pulses:       Radial pulses are 2+ on the right side, and 2+ on the left side.        Dorsalis pedis pulses are 2+ on the right side, and 2+ on the left side.   Warm extremities.    Pulmonary/Chest: No accessory muscle usage. No respiratory distress. He has no decreased breath sounds. He has no wheezes. He has no rales.   Abdominal: Soft. Bowel sounds are normal. He exhibits no distension. There is tenderness. There is no guarding.   Neurological: He is alert.   Oriented to person, place and time. Intermittent confusion, inappropriate answers. Non focal on exam.    Skin: Skin is warm and dry. Capillary refill takes less than 2 seconds. He is not diaphoretic. No pallor.       Vents:  Vent Mode: A/C (03/21/19 0922)  Ventilator Initiated: Yes (03/15/19 1151)  Set Rate: 14 bmp (03/21/19 0922)  Vt Set: 500 mL (03/21/19 0922)  Pressure Support: 0 cmH20 (03/15/19 1908)  PEEP/CPAP: 5 cmH20 (03/21/19 0922)  Oxygen Concentration (%): 21 (03/21/19 0300)  Peak Airway Pressure: 41 cmH2O (03/21/19 0922)  Plateau Pressure: 0 cmH20 (03/21/19 0922)  Total Ve: 8.17 mL (03/21/19 0922)  Negative Inspiratory Force (cm H2O): -39 (03/21/19 1255)  F/VT Ratio<105 (RSBI): (!) 42.39 (03/21/19 0922)  Lines/Drains/Airways     Peripheral Intravenous Line                 Peripheral IV - Single Lumen 03/23/19 1230 Left Forearm 1 day         Peripheral IV - Single Lumen 03/23/19 1240 Right Forearm 1 day         Peripheral IV - Single Lumen 03/23/19 1600 Right Wrist 1 day              Significant Labs:    CBC/Anemia Profile:  Recent Labs   Lab 03/24/19  0255 03/25/19  0530   WBC 8.52 10.10   HGB 10.7* 10.5*   HCT 32.6* 32.3*    227   MCV 99* 101*   RDW 12.8 13.0        Chemistries:  Recent Labs   Lab 03/24/19  0255 03/24/19  1112 03/24/19  2105 03/25/19  0530    138 138 140   K 3.1* 3.1* 3.5 3.5    103 104  105   CO2 24 24 22* 21*   BUN 13 12 10 9   CREATININE 0.8 0.8 0.8 0.8   CALCIUM 10.3 9.2 9.4 10.0   ALBUMIN 3.0*  --   --  3.0*   PROT 6.7  --   --  7.0   BILITOT 1.2*  --   --  1.0   ALKPHOS 56  --   --  64   ALT 68*  --   --  67*   AST 82*  --   --  76*   MG 1.4*  --   --  1.6   PHOS 4.0  --   --  2.9       Significant Imaging:  I have reviewed all pertinent imaging results/findings within the past 24 hours.      ABG  Recent Labs   Lab 03/21/19  1657   PH 7.444   PO2 97   PCO2 34.1*   HCO3 23.4*   BE -1     Assessment/Plan:     Neuro  * Acute metabolic encephalopathy  --improving. Suspect was experiencing alcohol withdrawal (appears improved) and likely delirium  --CT head and MRI brain without acute process. EEG negative.   --continue to monitor for withdrawal, but approaching time frame where this would be less likely  --delirium precautions, frequent reorientation. Telesitter ordered      Psychiatric  Alcoholism /alcohol abuse  --as above. Can consider addiction psych when mental status improves.     Alcohol withdrawal  --no signs of withdrawal this morning. However remains encephalopathic  --continue CIWA monitoring        Cardiac/Vascular  Cardiac arrest  --suspect etiology was unintentional BB overdose as described above.   --s/p TVP when admitted to cardiology, now removed.  --also could have had an aspiration event given possible seizure, encephalopathy preceding cardiac arrest.     Paroxysmal atrial fibrillation  --continue metoprolol, diltiazem for now. In NSR and hemodynamically stable  --can likely resume home apixaban today and d/c heparin gtt. Holding flecainide for now  --follow up with EP/cardiology as an outpatient     Endocrine  Diabetes mellitus without complication  --Accucheck and SSI PRN      GI  Elevated LFTs  --suspect some ischemia given arrest and underlying fatty liver  --Continue to trend        I spent >35 minutes reviewing patient records, examining, and counseling the patient with  greater than 50% of the time spent with direct patient care and coordination.        Heather Cedillo NP  Critical Care Medicine  [facility]

## 2019-03-25 NOTE — PLAN OF CARE
Problem: SLP Goal  Goal: SLP Goal  Speech Language Pathology Goals  Goals expected to be met by 3/28/19    1.  Pt will participate in ongoing swallow evaluation to determine readiness to resume po intake. -goal met  2.  Pt will complete Speech Language Cognitive evaluation to determine the need for additional goals.       Outcome: Ongoing (interventions implemented as appropriate)  Pt tolerating current diet.  REC:  Pt complete Speech Language Cognitive evaluation for further evaluation of cognitive-communication skills.  MD if in agreement, please order.  Cont ST per POC.    Emma Suazo CCC-SLP  3/25/2019

## 2019-03-25 NOTE — ASSESSMENT & PLAN NOTE
--continue metoprolol, diltiazem for now. In NSR and hemodynamically stable  --can likely resume home apixaban today and d/c heparin gtt. Holding flecainide for now  --follow up with EP/cardiology as an outpatient

## 2019-03-25 NOTE — ASSESSMENT & PLAN NOTE
--no signs of withdrawal this morning. However remains encephalopathic  --continue CIWA monitoring

## 2019-03-25 NOTE — RESIDENT HANDOFF
ICU Transfer of Care Note  Critical Care Medicine    Admit Date: 3/15/2019  LOS: 10    CC: Acute metabolic encephalopathy    Code Status: Full Code     Transfer to Hospital Medicine message via secure chat placed at 11:45 am.     HPI and Hospital Course:     HPI:  Mr Abadie is a 64 y/o male with PMH of left sided RCC s/p partial nephrectomy (2014), paroxysmal AFib, EtOH abuse ([facility]YCH offered rehabilitation but declined), HTN, HLD, and fatty liver who presented to [facility] ED on 3/15 with fatigue/malaise. There was concern that he had taken an accidental overdose of BB and flecainide because he felt that he was having tachycardia and reported taking additional doses of his prescribed metoprolol several times in the preceding days. He had also being drinking and his daughter reports hallucinations before ED admission. In ED, the EKG showed severe bradycardia. Reportedly, the patient displayed symptoms of EtOH withdrawal including anxiety, diaphoresis, agitation and hallucinations. There was concerned about a seizure prior to the patient going into into cardiac arrest.  A TVP was placed for HR in the 40s, post-ROSC. Etiology of arrest unclear but felt to be likely severe bradycardia given BB overdose.The patient remained intubated until 3/21 and was on the Cardiology service until 3/22 when MICU was consulted to assume care. During this week of admission, he was treated for alcohol withdrawals and on propofol and Versed during that time and also given a librium taper. He intermittently experience Afib/flutter and has been on a heparin infusion. Additionally, he had intermittent fevers, unclear source but was given intermittent Cefepime, Vancomycin until this was also discontinued with cultures negative. Since extubation, the patient has experienced altered mental status of unclear etiology. He never had any focal deficits, and is oriented to place and person.    The MICU assumed care on 3/22.            Hospital/ICU  Course:  3/23- Continue precedex, wean as tolerated. MRI shows no acute change. Bedside swallow, dc amaya, central line and abx.   3/24: Precedex stopped, Seroquel started. Hypotensive and Tachycardic, NS bolus with improvement  Metoprolol, diltiazem restarted for Afib, intermittent RVR and he has tolerated well. Mental status slowly improving.       To Follow Up:   --mental status: is alert, oriented to person, place, time, but still confused at times and memory loss. Non focal on exam. MRI brain negative. Suspect possibly wernicke's vs hepatic encephalopathy (although ammonia normal) vs delirium. May need neurology to be re-consulted if not improving...have started lactulose empirically today. Continuing thiamine replacement.   --RVR controlled with metoprolol and diltiazem. Have not restarted home flecainide, may want to touch base with Cardiology about recommended regimen. Apixaban restarted for anticoagulation       Discharge Plan:     --pending clinical course, likely home.     Call with questions.

## 2019-03-25 NOTE — NURSING TRANSFER
Nursing Transfer Note      3/25/2019     Transfer To: 8077    Transfer via bed    Transfer with cardiac monitoring    Transported by RNx2, PCT    Medicines sent: miconazole powder    Chart send with patient: Yes    Notified: daughter    Patient reassessed at: 3/25/19 1445    Upon arrival to floor: cardiac monitor applied, patient oriented to room, call bell in reach and bed in lowest position    AVASYS notified of pt and camera 11 moved from 6092 to 8077.    All belongings given to pts Chani fried.

## 2019-03-25 NOTE — ASSESSMENT & PLAN NOTE
--suspect etiology was unintentional BB overdose as described above.   --s/p TVP when admitted to cardiology, now removed.  --also could have had an aspiration event given possible seizure, encephalopathy preceding cardiac arrest.

## 2019-03-26 LAB
ALBUMIN SERPL BCP-MCNC: 3.4 G/DL (ref 3.5–5.2)
ALP SERPL-CCNC: 76 U/L (ref 55–135)
ALT SERPL W/O P-5'-P-CCNC: 70 U/L (ref 10–44)
ANION GAP SERPL CALC-SCNC: 14 MMOL/L (ref 8–16)
AST SERPL-CCNC: 64 U/L (ref 10–40)
BACTERIA BLD CULT: NORMAL
BACTERIA BLD CULT: NORMAL
BASOPHILS # BLD AUTO: 0.14 K/UL (ref 0–0.2)
BASOPHILS NFR BLD: 1.2 % (ref 0–1.9)
BILIRUB SERPL-MCNC: 1.1 MG/DL (ref 0.1–1)
BUN SERPL-MCNC: 5 MG/DL (ref 8–23)
CALCIUM SERPL-MCNC: 10.3 MG/DL (ref 8.7–10.5)
CHLORIDE SERPL-SCNC: 99 MMOL/L (ref 95–110)
CO2 SERPL-SCNC: 23 MMOL/L (ref 23–29)
CREAT SERPL-MCNC: 0.8 MG/DL (ref 0.5–1.4)
DIFFERENTIAL METHOD: ABNORMAL
EOSINOPHIL # BLD AUTO: 0.1 K/UL (ref 0–0.5)
EOSINOPHIL NFR BLD: 0.6 % (ref 0–8)
ERYTHROCYTE [DISTWIDTH] IN BLOOD BY AUTOMATED COUNT: 12.8 % (ref 11.5–14.5)
EST. GFR  (AFRICAN AMERICAN): >60 ML/MIN/1.73 M^2
EST. GFR  (NON AFRICAN AMERICAN): >60 ML/MIN/1.73 M^2
ESTIMATED AVG GLUCOSE: 148 MG/DL (ref 68–131)
GLUCOSE SERPL-MCNC: 118 MG/DL (ref 70–110)
HBA1C MFR BLD HPLC: 6.8 % (ref 4–5.6)
HCT VFR BLD AUTO: 33.7 % (ref 40–54)
HGB BLD-MCNC: 11.1 G/DL (ref 14–18)
IMM GRANULOCYTES # BLD AUTO: 0.13 K/UL (ref 0–0.04)
IMM GRANULOCYTES NFR BLD AUTO: 1.1 % (ref 0–0.5)
INR PPP: 1.1 (ref 0.8–1.2)
LYMPHOCYTES # BLD AUTO: 1.6 K/UL (ref 1–4.8)
LYMPHOCYTES NFR BLD: 13.9 % (ref 18–48)
MAGNESIUM SERPL-MCNC: 1.5 MG/DL (ref 1.6–2.6)
MCH RBC QN AUTO: 32.5 PG (ref 27–31)
MCHC RBC AUTO-ENTMCNC: 32.9 G/DL (ref 32–36)
MCV RBC AUTO: 99 FL (ref 82–98)
MONOCYTES # BLD AUTO: 0.8 K/UL (ref 0.3–1)
MONOCYTES NFR BLD: 6.5 % (ref 4–15)
NEUTROPHILS # BLD AUTO: 9 K/UL (ref 1.8–7.7)
NEUTROPHILS NFR BLD: 76.7 % (ref 38–73)
NRBC BLD-RTO: 0 /100 WBC
PHOSPHATE SERPL-MCNC: 3.2 MG/DL (ref 2.7–4.5)
PLATELET # BLD AUTO: 256 K/UL (ref 150–350)
PMV BLD AUTO: 10.3 FL (ref 9.2–12.9)
POCT GLUCOSE: 122 MG/DL (ref 70–110)
POCT GLUCOSE: 141 MG/DL (ref 70–110)
POCT GLUCOSE: 155 MG/DL (ref 70–110)
POCT GLUCOSE: 156 MG/DL (ref 70–110)
POTASSIUM SERPL-SCNC: 3.1 MMOL/L (ref 3.5–5.1)
PROT SERPL-MCNC: 7.6 G/DL (ref 6–8.4)
PROTHROMBIN TIME: 11.2 SEC (ref 9–12.5)
RBC # BLD AUTO: 3.42 M/UL (ref 4.6–6.2)
SODIUM SERPL-SCNC: 136 MMOL/L (ref 136–145)
VIT B1 BLD-MCNC: 98 UG/L (ref 38–122)
WBC # BLD AUTO: 11.78 K/UL (ref 3.9–12.7)

## 2019-03-26 PROCEDURE — 83735 ASSAY OF MAGNESIUM: CPT

## 2019-03-26 PROCEDURE — 92523 SPEECH SOUND LANG COMPREHEN: CPT

## 2019-03-26 PROCEDURE — 25000003 PHARM REV CODE 250: Performed by: NURSE PRACTITIONER

## 2019-03-26 PROCEDURE — 85610 PROTHROMBIN TIME: CPT

## 2019-03-26 PROCEDURE — 25000003 PHARM REV CODE 250: Performed by: STUDENT IN AN ORGANIZED HEALTH CARE EDUCATION/TRAINING PROGRAM

## 2019-03-26 PROCEDURE — 97116 GAIT TRAINING THERAPY: CPT

## 2019-03-26 PROCEDURE — 25000003 PHARM REV CODE 250: Performed by: INTERNAL MEDICINE

## 2019-03-26 PROCEDURE — 84100 ASSAY OF PHOSPHORUS: CPT

## 2019-03-26 PROCEDURE — 36415 COLL VENOUS BLD VENIPUNCTURE: CPT

## 2019-03-26 PROCEDURE — 85025 COMPLETE CBC W/AUTO DIFF WBC: CPT

## 2019-03-26 PROCEDURE — 97535 SELF CARE MNGMENT TRAINING: CPT

## 2019-03-26 PROCEDURE — 94668 MNPJ CHEST WALL SBSQ: CPT

## 2019-03-26 PROCEDURE — 99233 SBSQ HOSP IP/OBS HIGH 50: CPT | Mod: ,,, | Performed by: INTERNAL MEDICINE

## 2019-03-26 PROCEDURE — 83036 HEMOGLOBIN GLYCOSYLATED A1C: CPT

## 2019-03-26 PROCEDURE — 63600175 PHARM REV CODE 636 W HCPCS: Performed by: PHYSICIAN ASSISTANT

## 2019-03-26 PROCEDURE — 63600175 PHARM REV CODE 636 W HCPCS: Performed by: INTERNAL MEDICINE

## 2019-03-26 PROCEDURE — 99233 PR SUBSEQUENT HOSPITAL CARE,LEVL III: ICD-10-PCS | Mod: ,,, | Performed by: INTERNAL MEDICINE

## 2019-03-26 PROCEDURE — 20600001 HC STEP DOWN PRIVATE ROOM

## 2019-03-26 PROCEDURE — 80053 COMPREHEN METABOLIC PANEL: CPT

## 2019-03-26 RX ORDER — MAGNESIUM SULFATE HEPTAHYDRATE 40 MG/ML
2 INJECTION, SOLUTION INTRAVENOUS ONCE
Status: COMPLETED | OUTPATIENT
Start: 2019-03-26 | End: 2019-03-26

## 2019-03-26 RX ORDER — LANOLIN ALCOHOL/MO/W.PET/CERES
1000 CREAM (GRAM) TOPICAL DAILY
Status: DISCONTINUED | OUTPATIENT
Start: 2019-03-26 | End: 2019-03-29 | Stop reason: HOSPADM

## 2019-03-26 RX ORDER — FOLIC ACID 1 MG/1
1 TABLET ORAL DAILY
Status: DISCONTINUED | OUTPATIENT
Start: 2019-03-26 | End: 2019-03-29 | Stop reason: HOSPADM

## 2019-03-26 RX ORDER — HALOPERIDOL 5 MG/ML
5 INJECTION INTRAMUSCULAR ONCE
Status: COMPLETED | OUTPATIENT
Start: 2019-03-26 | End: 2019-03-26

## 2019-03-26 RX ADMIN — THERA TABS 1 TABLET: TAB at 03:03

## 2019-03-26 RX ADMIN — APIXABAN 5 MG: 5 TABLET, FILM COATED ORAL at 09:03

## 2019-03-26 RX ADMIN — Medication 100 MG: at 09:03

## 2019-03-26 RX ADMIN — MICONAZOLE NITRATE: 20 POWDER TOPICAL at 11:03

## 2019-03-26 RX ADMIN — POTASSIUM BICARBONATE 50 MEQ: 25 TABLET, EFFERVESCENT ORAL at 09:03

## 2019-03-26 RX ADMIN — FOLIC ACID 1 MG: 1 TABLET ORAL at 03:03

## 2019-03-26 RX ADMIN — LACTULOSE 20 G: 20 SOLUTION ORAL at 03:03

## 2019-03-26 RX ADMIN — APIXABAN 5 MG: 5 TABLET, FILM COATED ORAL at 11:03

## 2019-03-26 RX ADMIN — DILTIAZEM HYDROCHLORIDE 120 MG: 120 CAPSULE, COATED, EXTENDED RELEASE ORAL at 09:03

## 2019-03-26 RX ADMIN — CYANOCOBALAMIN TAB 1000 MCG 1000 MCG: 1000 TAB at 03:03

## 2019-03-26 RX ADMIN — LACTULOSE 20 G: 20 SOLUTION ORAL at 09:03

## 2019-03-26 RX ADMIN — MICONAZOLE NITRATE: 20 POWDER TOPICAL at 10:03

## 2019-03-26 RX ADMIN — METOPROLOL TARTRATE 25 MG: 25 TABLET ORAL at 11:03

## 2019-03-26 RX ADMIN — METOPROLOL TARTRATE 25 MG: 25 TABLET ORAL at 09:03

## 2019-03-26 RX ADMIN — HALOPERIDOL LACTATE 5 MG: 5 INJECTION, SOLUTION INTRAMUSCULAR at 01:03

## 2019-03-26 RX ADMIN — LACTULOSE 20 G: 20 SOLUTION ORAL at 11:03

## 2019-03-26 RX ADMIN — MAGNESIUM SULFATE IN WATER 2 G: 40 INJECTION, SOLUTION INTRAVENOUS at 11:03

## 2019-03-26 NOTE — PT/OT/SLP PROGRESS
GROUP THERAPY PROGRESS NOTE Jerri Zuleyka is participating in [facility]. Group time: 30 minutes Personal goal for participation: Aki Charles to unit Goal orientation: community Group therapy participation: active Therapeutic interventions reviewed and discussed:Unit guidelines for behavior, visitation and schedule. Discussed pt concerns and daily goals. Impression of participation: Engaged Physical Therapy Treatment    Patient Name:  Donald Warren Abadie   MRN:  [id number]    Recommendations:     Discharge Recommendations:  nursing facility, skilled, progressing toward HH PT  Discharge Equipment Recommendations: (TBD)   Barriers to discharge: Decreased caregiver support(lives alone)     Assessment:     Donald Warren Abadie is a 63 y.o. male admitted with a medical diagnosis of Acute metabolic encephalopathy.  He presents with the following impairments/functional limitations:  weakness, impaired endurance, impaired self care skills, impaired functional mobilty, gait instability, impaired balance, decreased safety awareness Pt. cooperative and tolerated treatment well. Pt. progressing with mobility.    Rehab Prognosis: Good; patient would benefit from acute skilled PT services to address these deficits and reach maximum level of function.    Recent Surgery: Procedure(s) (LRB):  CARDIOVERSION OR DEFIBRILLATION (N/A)  ECHOCARDIOGRAM,TRANSESOPHAGEAL (N/A)      Plan:     During this hospitalization, patient to be seen 4 x/week to address the identified rehab impairments via gait training, therapeutic activities, therapeutic exercises and progress toward the following goals:    · Plan of Care Expires:  04/20/19    Subjective     Chief Complaint: weakness  Patient/Family Comments/goals: to get stronger  Pain/Comfort:  · Pain Rating 1: 0/10  · Pain Rating Post-Intervention 1: 0/10      Objective:     Communicated with nursing prior to session.  Patient found up in chair with peripheral IV upon PT entry to room.     General Precautions: Standard, aspiration, fall   Orthopedic Precautions:N/A   Braces:       Functional Mobility:  · Transfers:     · Sit to Stand:  contact guard assistance with rolling walker  · Bed to Chair: contact guard assistance with  rolling walker  using  Stand Pivot  · Gait: 225' with RW and CGA and VCs for posture. After seated rest break, pt. amb. 150' with RW and CGA. Pt. amb. with  decreased stride length/yonas.  · Balance: fair      AM-PAC 6 CLICK MOBILITY  Turning over in bed (including adjusting bedclothes, sheets and blankets)?: 3  Sitting down on and standing up from a chair with arms (e.g., wheelchair, bedside commode, etc.): 3  Moving from lying on back to sitting on the side of the bed?: 3  Moving to and from a bed to a chair (including a wheelchair)?: 3  Need to walk in hospital room?: 3  Climbing 3-5 steps with a railing?: 2  Basic Mobility Total Score: 17       Therapeutic Activities and Exercises:   Discussed pt.'s progress, goals, and POC.    Patient left up in chair with all lines intact and call button in reach..    GOALS:   Multidisciplinary Problems     Physical Therapy Goals        Problem: Physical Therapy Goal    Goal Priority Disciplines Outcome Goal Variances Interventions   Physical Therapy Goal     PT, PT/OT Ongoing (interventions implemented as appropriate)     Description:  Goals to be met by: 19    Patient will increase functional independence with mobility by performin. Supine to sit with Contact Guard Assistance -not met  2. Sit to stand transfer with Contact Guard Assistance - met       Revised: Sit to stand transfer with Supervision - Not met  3. Gait  x 150 feet with Contact Guard Assistance using AD if needed.- met       Revised: Gait  x 250 feet with Supervision using AD, if needed.- Not met                       Time Tracking:     PT Received On: 19  PT Start Time: 1453     PT Stop Time: 1517  PT Total Time (min): 24 min     Billable Minutes: Gait Training 24    Treatment Type: Treatment  PT/PTA: PT     PTA Visit Number: 0     Quique Padilla, PT  2019

## 2019-03-26 NOTE — PLAN OF CARE
Problem: Adult Inpatient Plan of Care  Goal: Plan of Care Review  Outcome: Ongoing (interventions implemented as appropriate)  Pt oriented to self. VSS at this time. Bedrest maintained. BSC was attempted when pt needed to have a BM but he became lightheaded and complained of CP. No complaints of CP or lightheadedness at this time. 3 BMs this shift since lactulose was given. Pts BP was elevated but it was noted that the pt was crying and emotionally upset. MD Villarreal notifed of BP one time dose of Hydralazine ordered but not given because his BP returned to normal. PRN Vistaril ordered Q 8 for anxiety. POC reviewed with pt and daughter who verbalize understanding.

## 2019-03-26 NOTE — PLAN OF CARE
Problem: Adult Inpatient Plan of Care  Goal: Plan of Care Review  Pt oriented to self. Vital signs are stable. Daughter at bedside for part of the shift. Pt was very and agitated, received vistaril and 1 dose of Haldol during the night. Call light and personal belongings within reach. [employer] camera in place for pt safety. Plan of care was discussed with daughter and pt, all questions and concerns were addressed. Will continue to monitor.

## 2019-03-26 NOTE — PLAN OF CARE
Problem: SLP Goal  Goal: SLP Goal  Speech Language Pathology Goals  Goals expected to be met by 3/28/19    1.  Pt will participate in ongoing swallow evaluation to determine readiness to resume po intake. -goal met  2.  Pt will complete Speech Language Cognitive evaluation to determine the need for additional goals. Goal met    New goals to be met 4/2  1/ pt will be ox4 using any modality  2 pt will complete mental manipulation task with 80% acc and min a  3 pt will participate in further eval of time/money management skills        Speech lang eval completed.     RADHA Vanegas, CCC-SLP  3/26/2019

## 2019-03-26 NOTE — PT/OT/SLP EVAL
"Speech Language Pathology Evaluation  Cognitive Communication    Patient Name:  Donald Warren Abadie   MRN:  [id number]  Admitting Diagnosis: Acute metabolic encephalopathy    Recommendations:     Recommendations:                General Recommendations:  Cognitive-linguistic therapy  Diet recommendations:  Regular, Thin   Aspiration Precautions: Standard aspiration precautions   General Precautions: Standard, aspiration, fall  Communication strategies:  hearing aids    History:     Past Medical History:   Diagnosis Date    A-fib     Alcohol abuse     Arthritis     Chronic gout     Diabetes mellitus     DM (diabetes mellitus) 11/16/2016    Fatty liver     Hyperlipidemia     Renal cell cancer 2014       Past Surgical History:   Procedure Laterality Date    ABSCESS DRAINAGE      perirectal    COLONOSCOPY      FISTULOTOMY N/A 5/19/2015    Performed by Shane Hughes [facility] at [facility] OR [address] [place] [address]    HAND SURGERY Left     HEMORRHOIDECTOMY N/A 5/19/2015    Performed by Shane Hughes MD at [facility] OR [address] [place] [address]    KIDNEY SURGERY      partial left kidney removal - [place]    PARTIAL NEPHRECTOMY Left August 2014    ROBOTIC ASSISTED LAPAROSCOPIC NEPHRECTOMY PARTIAL Left 8/28/2014    Performed by Fabian Estevez MD at [facility] OR [address] [place] [address]       Social History: Patient lives with alone    Occupation/hobbies/homemaking:retired construction; 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 Pt has bilateral hearing aids but does not wear them often. He was oriented to place, month, year and date. He recalled 0/3 objects and with cues 3/3 objects. He recalled up to 5 digits and 4 words. Responses to hypothetical problems were accurate and pt able to generate multiple solutions. He was able to " compare/contrast items given cues. He completed categorizational task with 100% acc. Increased time needed and repetition of items needed due to decreased hearing. Pt named only 9 items during word fluency task when 15-20 is wnl.      Receptive Language:   Comprehension:   Pt responded to complex questions with 90% acc and followed complex commands given repetition for decreased hearing.       Expressive Language:  Verbal:    Pt able to express his wants and needs.  He named 5/6 objects and with phonemic cues 6/6.       Motor Speech:  WFL    Voice:   WFL    Visual-Spatial:  WFL    Reading:   WFL     Written Expression:   Pt able to write his name. When asked to write a sentence, spelling and grammatical errors noted. Daughter reported a change in pt's signature.     Treatment: Daughter reported improvement in pt's cognition but that he has increased confusion at night. Education provided re: role of slp and poc. White board updated.     Assessment:   Donald Warren Abadie is a 63 y.o. male with an SLP diagnosis of Cognitive-Linguistic Impairment.     Goals:   Multidisciplinary Problems     SLP Goals        Problem: SLP Goal    Goal Priority Disciplines Outcome   SLP Goal     SLP Ongoing (interventions implemented as appropriate)   Description:  Speech Language Pathology Goals  Goals expected to be met by 3/28/19    1.  Pt will participate in ongoing swallow evaluation to determine readiness to resume po intake. -goal met  2.  Pt will complete Speech Language Cognitive evaluation to determine the need for additional goals. Goal met    New goals to be met 4/2  1/ pt will be ox4 using any modality  2 pt will complete mental manipulation task with 80% acc and min a  3 pt will participate in further eval of time/money management skills  4. Pt will write short sentences with 80% acc.                        Plan:   · Patient to be seen:  3 x/week   · Plan of Care expires:  04/21/19  · Plan of Care reviewed with:  patient    · SLP Follow-Up:  Yes       Discharge recommendations:  Discharge Facility/Level of Care Needs: nursing facility, skilled       Time Tracking:   SLP Treatment Date:   03/26/19  Speech Start Time:  1045  Speech Stop Time:  1108     Speech Total Time (min):  23 min    Billable Minutes: [place] 23     RADHA Vanegas, CCC-SLP  03/26/2019

## 2019-03-26 NOTE — PLAN OF CARE
142 received and placed in [facility].    Eduard Maguire LMSW  [facility]  [id number]

## 2019-03-26 NOTE — PLAN OF CARE
Problem: Physical Therapy Goal  Goal: Physical Therapy Goal  Goals to be met by: 19    Patient will increase functional independence with mobility by performin. Supine to sit with Contact Guard Assistance -not met  2. Sit to stand transfer with Contact Guard Assistance - met       Revised: Sit to stand transfer with Supervision - Not met  3. Gait  x 150 feet with Contact Guard Assistance using AD if needed.- met       Revised: Gait  x 250 feet with Supervision using AD, if needed.- Not met     Outcome: Ongoing (interventions implemented as appropriate)  Goals #2-3 met and revised

## 2019-03-26 NOTE — PROGRESS NOTES
[facility] Hospital Medicine  Progress Note    Patient Name: Donald Warren Abadie  MRN: [id number]  Patient Class: IP- Inpatient   Admission Date: 3/15/2019  Length of Stay: 11 days  Attending Physician: DEAN Vigil MD  Primary Care Provider: Bacilio Larry MD    [facility] Medicine Team: [facility] [place] [facility] MED S [place] Vigil MD    Subjective:     Principal Problem:Acute metabolic encephalopathy    HPI:  Mr Abadie is a 62 y/o male with PMH of left sided RCC s/p partial nephrectomy (2014), paroxysmal AFib, EtOH abuse (PSYCH offered rehabilitation but declined), HTN, HLD, and fatty liver who presented to [facility] [place] on 3/15 with fatigue/malaise. There was concern that he had taken an accidental overdose of BB and flecainide because he felt that he was having tachycardia and reported taking additional doses of his prescribed metoprolol several times in the preceding days. He had also being drinking and his daughter reports hallucinations before ED admission. In ED, the EKG showed severe bradycardia. Reportedly, the patient displayed symptoms of EtOH withdrawal including anxiety, diaphoresis, agitation and hallucinations. There was concerned about a seizure prior to the patient going into into cardiac arrest.  A TVP was placed for HR in the 40s, post-ROSC. Etiology of arrest unclear but felt to be likely severe bradycardia given BB overdose.The patient remained intubated until 3/21 and was on the Cardiology service until 3/22 when MICU was consulted to assume care. During this week of admission, he was treated for alcohol withdrawals and on propofol and Versed during that time and also given a librium taper. He intermittently experience Afib/flutter and has been on a heparin infusion. Additionally, he had intermittent fevers, unclear source but was given intermittent Cefepime, Vancomycin until this was also discontinued with cultures negative. Since extubation, the patient has experienced altered mental status  "of unclear etiology. He never had any focal deficits, and is oriented to place and person. The MICU assumed care on 3/22.   Hospital/ICU Course:  3/23- Continue precedex, wean as tolerated. MRI shows no acute change. Bedside swallow, dc amaya, central line and abx.   3/24: Precedex stopped, Seroquel started. Hypotensive and Tachycardic, NS bolus with improvement  Metoprolol, diltiazem restarted for Afib, intermittent RVR and he has tolerated well. Mental status slowly improving.       Interval History: Mr. Abadie remains very confused.  He rolled over in the bed and grabbed the side rails and activated the nursing call button by mistake and could not grasp what happened.  His daughter reports significant agitation last night, sundowning type behavior.  He was angry and trying to leave the hospital, though he has no memory of this today.  When asked about it, he responds, "yesterday was worse; it was raining I think."  He denies pain currently or any major complaints.  He is fatigued and groggy after getting a PRN dose of Haldol last night for agitation.    Review of Systems   Constitutional: Positive for fatigue. Negative for chills and fever.   HENT: Negative for congestion.    Respiratory: Positive for cough. Negative for shortness of breath.    Cardiovascular: Negative for chest pain.   Gastrointestinal: Negative for abdominal pain, constipation, diarrhea, nausea and vomiting.   Neurological: Negative for dizziness and weakness.   Psychiatric/Behavioral: Positive for agitation, behavioral problems, confusion, decreased concentration and sleep disturbance. The patient is nervous/anxious.      Objective:     Vital Signs (Most Recent):  Temp: 99.1 °F (37.3 °C) (03/26/19 0803)  Pulse: 78 (03/26/19 0803)  Resp: 20 (03/26/19 0803)  BP: 130/69 (03/26/19 0803)  SpO2: 96 % (03/26/19 0803) Vital Signs (24h Range):  Temp:  [98.1 °F (36.7 °C)-99.1 °F (37.3 °C)] 99.1 °F (37.3 °C)  Pulse:  [63-87] 78  Resp:  [18-27] 20  SpO2:  " [95 %-99 %] 96 %  BP: (115-192)/(55-93) 130/69     Weight: 79.5 kg (175 lb 4.3 oz)  Body mass index is 28.31 kg/m².    Intake/Output Summary (Last 24 hours) at 3/26/2019 0859  Last data filed at 3/26/2019 0600  Gross per 24 hour   Intake 263.84 ml   Output 200 ml   Net 63.84 ml      Physical Exam   Constitutional: He appears well-developed and well-nourished. He appears listless.  Non-toxic appearance. He has a sickly appearance. He does not appear ill. No distress.   HENT:   Head: Normocephalic and atraumatic.   Nose: Nose normal.   Mouth/Throat: Oropharynx is clear and moist. Abnormal dentition. No oropharyngeal exudate.   Eyes: Pupils are equal, round, and reactive to light. EOM are normal. Right eye exhibits no discharge. Left eye exhibits no discharge. Right conjunctiva is injected. Left conjunctiva is injected. No scleral icterus.   Neck: Normal range of motion. Neck supple. No JVD present. No tracheal deviation present. No thyromegaly present.   Cardiovascular: Normal rate, regular rhythm, normal heart sounds and intact distal pulses. Exam reveals no gallop and no friction rub.   No murmur heard.  Pulmonary/Chest: Effort normal. No accessory muscle usage or stridor. No tachypnea and no bradypnea. No respiratory distress. He has no decreased breath sounds. He has no wheezes. He has no rhonchi. He has rales in the right lower field and the left lower field. He exhibits no tenderness.   Abdominal: Soft. Bowel sounds are normal. He exhibits no distension and no mass. There is no tenderness. There is no rebound and no guarding.   Truncal obesity   Genitourinary:   Genitourinary Comments: No amaya in place   Musculoskeletal: Normal range of motion. He exhibits no edema or tenderness.   Lymphadenopathy:     He has no cervical adenopathy.   No peripheral edema   Neurological: He appears listless. He is disoriented. He displays normal reflexes. No cranial nerve deficit. He exhibits normal muscle tone. Coordination  normal. GCS eye subscore is 4. GCS verbal subscore is 5. GCS motor subscore is 6.   Alert and oriented 1/3, person only   Skin: Skin is warm and dry. No rash noted. No erythema. No pallor.   Psychiatric: Judgment normal. His speech is delayed. He is slowed. Cognition and memory are impaired. He exhibits a depressed mood. He exhibits abnormal recent memory and abnormal remote memory.   Mr. Abadie is mild to moderately confused, but able to recognize his daughter and two grandboys at the bedside by name.  He told a co-worker that he was at work earlier despite being in the hospital.   Nursing note and vitals reviewed.    ALL LABS, RADIOGRAPHS AND EKG'S PERSONALLY REVIEWED:  Significant Labs:   Recent Results (from the past 24 hour(s))   POCT glucose    Collection Time: 03/25/19 11:33 AM   Result Value Ref Range    POCT Glucose 164 (H) 70 - 110 mg/dL   POCT glucose    Collection Time: 03/25/19  9:58 PM   Result Value Ref Range    POCT Glucose 146 (H) 70 - 110 mg/dL   Comprehensive metabolic panel    Collection Time: 03/26/19  5:11 AM   Result Value Ref Range    Sodium 136 136 - 145 mmol/L    Potassium 3.1 (L) 3.5 - 5.1 mmol/L    Chloride 99 95 - 110 mmol/L    CO2 23 23 - 29 mmol/L    Glucose 118 (H) 70 - 110 mg/dL    BUN, Bld 5 (L) 8 - 23 mg/dL    Creatinine 0.8 0.5 - 1.4 mg/dL    Calcium 10.3 8.7 - 10.5 mg/dL    Total Protein 7.6 6.0 - 8.4 g/dL    Albumin 3.4 (L) 3.5 - 5.2 g/dL    Total Bilirubin 1.1 (H) 0.1 - 1.0 mg/dL    Alkaline Phosphatase 76 55 - 135 U/L    AST 64 (H) 10 - 40 U/L    ALT 70 (H) 10 - 44 U/L    Anion Gap 14 8 - 16 mmol/L    eGFR if African American >60.0 >60 mL/min/1.73 m^2    eGFR if non African American >60.0 >60 mL/min/1.73 m^2   Magnesium    Collection Time: 03/26/19  5:11 AM   Result Value Ref Range    Magnesium 1.5 (L) 1.6 - 2.6 mg/dL   Phosphorus    Collection Time: 03/26/19  5:11 AM   Result Value Ref Range    Phosphorus 3.2 2.7 - 4.5 mg/dL   Protime-INR    Collection Time: 03/26/19  5:12 AM    Result Value Ref Range    Prothrombin Time 11.2 9.0 - 12.5 sec    INR 1.1 0.8 - 1.2   CBC auto differential    Collection Time: 03/26/19  5:12 AM   Result Value Ref Range    WBC 11.78 3.90 - 12.70 K/uL    RBC 3.42 (L) 4.60 - 6.20 M/uL    Hemoglobin 11.1 (L) 14.0 - 18.0 g/dL    Hematocrit 33.7 (L) 40.0 - 54.0 %    MCV 99 (H) 82 - 98 fL    MCH 32.5 (H) 27.0 - 31.0 pg    MCHC 32.9 32.0 - 36.0 g/dL    RDW 12.8 11.5 - 14.5 %    Platelets 256 150 - 350 K/uL    MPV 10.3 9.2 - 12.9 fL    Immature Granulocytes 1.1 (H) 0.0 - 0.5 %    Gran # (ANC) 9.0 (H) 1.8 - 7.7 K/uL    Immature Grans (Abs) 0.13 (H) 0.00 - 0.04 K/uL    Lymph # 1.6 1.0 - 4.8 K/uL    Mono # 0.8 0.3 - 1.0 K/uL    Eos # 0.1 0.0 - 0.5 K/uL    Baso # 0.14 0.00 - 0.20 K/uL    nRBC 0 0 /100 WBC    Gran% 76.7 (H) 38.0 - 73.0 %    Lymph% 13.9 (L) 18.0 - 48.0 %    Mono% 6.5 4.0 - 15.0 %    Eosinophil% 0.6 0.0 - 8.0 %    Basophil% 1.2 0.0 - 1.9 %    Differential Method Automated    Hemoglobin A1c    Collection Time: 03/26/19  5:12 AM   Result Value Ref Range    Hemoglobin A1C 6.8 (H) 4.0 - 5.6 %    Estimated Avg Glucose 148 (H) 68 - 131 mg/dL   POCT glucose    Collection Time: 03/26/19  7:31 AM   Result Value Ref Range    POCT Glucose 122 (H) 70 - 110 mg/dL     MELD-Na score: 8 at 3/26/2019  5:12 AM  MELD score: 8 at 3/26/2019  5:12 AM  Calculated from:  Serum Creatinine: 0.8 mg/dL (Rounded to 1 mg/dL) at 3/26/2019  5:11 AM  Serum Sodium: 136 mmol/L at 3/26/2019  5:11 AM  Total Bilirubin: 1.1 mg/dL at 3/26/2019  5:11 AM  INR(ratio): 1.1 at 3/26/2019  5:12 AM  Age: 63 years     Significant Imaging:     None in the last 24 hours    Assessment/Plan:      Acute metabolic encephalopathy  -Likely multifactorial from ETOH abuse and withdrawal, delirium, arrest, intubation and sedation  -EEG on admission negative  -Delirium precautions  -CT on admission and on 3/22 shows no acute process  -MRI shows no acute abnormality and small remote left occipital lobe infarct  -thiamine  tablet 100 mg, 100 mg, Oral, Daily  -QUEtiapine tablet 25 mg, 25 mg, Oral, QHS   -Daughter feels this is over sedating, so stopping  -Will start Haldol 2mg po QHS until Psych can evaluate   -I discussed this with them  -Required 5mg of IV Haldol last night due to severe agitation    ETOH abuse and addiction  -Psychiatry consult    Alcohol withdrawal, resolved  -Originally on Precedex infusion, then completed benzo taper    Hypokalemia  -potassium bicarbonate disintegrating tablet 50 mEq, 50 mEq, Oral, Once  -Keep K>4, Mg>2    Hypomagnesemia  -magnesium sulfate 2g in water 50mL IVPB (premix), 2 g, Intravenous, Once    Cardiopulmonary arrest  -Unsure of etiology for sure  -Thinking was overdose of home beta blocker induced severe bradycardia, then arrest   -Glucagon was given in ED       Paroxysmal atrial fibrillation  -Holding home flecainide  -diltiaZEM 24 hr capsule 120 mg, 120 mg, Oral, Daily  -metoprolol tartrate (LOPRESSOR) tablet 25 mg, 25 mg, Oral, BID  -Anticoagulation as per below    Long term use of anticoagulation  -apixaban tablet 5 mg, 5 mg, Oral, BID      Diabetes mellitus without complication  -insulin aspart U-100 pen 0-5 Units, 0-5 Units, Subcutaneous, QID (AC + HS) PRN     Elevated LFTs  -lactulose 20 gram/30 mL solution Soln 20 g, 20 g, Oral, TID  -INR normal with low MELD: no high suspicion for severe cirrhosis    Macrocytic anemia  -Likely due to ETOH abuse  -Add MVI, Folic Acid 1mg po qday  -B12 1000mcg po QDAY        VTE Risk Mitigation (From admission, onward)        Ordered     apixaban tablet 5 mg  2 times daily      03/25/19 1058     IP VTE HIGH RISK PATIENT  Once      03/15/19 0847             WASHINGTON Vigli MD  Department of Hospital Medicine   [facility]

## 2019-03-27 PROBLEM — F19.94 SUBSTANCE INDUCED MOOD DISORDER: Status: ACTIVE | Noted: 2019-03-27

## 2019-03-27 LAB
ALBUMIN SERPL BCP-MCNC: 3.2 G/DL (ref 3.5–5.2)
ALP SERPL-CCNC: 75 U/L (ref 55–135)
ALT SERPL W/O P-5'-P-CCNC: 97 U/L (ref 10–44)
ANION GAP SERPL CALC-SCNC: 13 MMOL/L (ref 8–16)
AST SERPL-CCNC: 118 U/L (ref 10–40)
BASOPHILS # BLD AUTO: 0.13 K/UL (ref 0–0.2)
BASOPHILS NFR BLD: 1.4 % (ref 0–1.9)
BILIRUB SERPL-MCNC: 0.9 MG/DL (ref 0.1–1)
BUN SERPL-MCNC: 8 MG/DL (ref 8–23)
CALCIUM SERPL-MCNC: 10.1 MG/DL (ref 8.7–10.5)
CHLORIDE SERPL-SCNC: 102 MMOL/L (ref 95–110)
CO2 SERPL-SCNC: 23 MMOL/L (ref 23–29)
CREAT SERPL-MCNC: 1 MG/DL (ref 0.5–1.4)
DIFFERENTIAL METHOD: ABNORMAL
EOSINOPHIL # BLD AUTO: 0.1 K/UL (ref 0–0.5)
EOSINOPHIL NFR BLD: 1.4 % (ref 0–8)
ERYTHROCYTE [DISTWIDTH] IN BLOOD BY AUTOMATED COUNT: 13 % (ref 11.5–14.5)
EST. GFR  (AFRICAN AMERICAN): >60 ML/MIN/1.73 M^2
EST. GFR  (NON AFRICAN AMERICAN): >60 ML/MIN/1.73 M^2
GLUCOSE SERPL-MCNC: 136 MG/DL (ref 70–110)
HCT VFR BLD AUTO: 33.7 % (ref 40–54)
HGB BLD-MCNC: 10.7 G/DL (ref 14–18)
IMM GRANULOCYTES # BLD AUTO: 0.12 K/UL (ref 0–0.04)
IMM GRANULOCYTES NFR BLD AUTO: 1.3 % (ref 0–0.5)
INR PPP: 1.1 (ref 0.8–1.2)
LYMPHOCYTES # BLD AUTO: 1.6 K/UL (ref 1–4.8)
LYMPHOCYTES NFR BLD: 17.5 % (ref 18–48)
MAGNESIUM SERPL-MCNC: 1.5 MG/DL (ref 1.6–2.6)
MCH RBC QN AUTO: 32 PG (ref 27–31)
MCHC RBC AUTO-ENTMCNC: 31.8 G/DL (ref 32–36)
MCV RBC AUTO: 101 FL (ref 82–98)
MONOCYTES # BLD AUTO: 0.9 K/UL (ref 0.3–1)
MONOCYTES NFR BLD: 9.6 % (ref 4–15)
NEUTROPHILS # BLD AUTO: 6.5 K/UL (ref 1.8–7.7)
NEUTROPHILS NFR BLD: 68.8 % (ref 38–73)
NRBC BLD-RTO: 0 /100 WBC
PHOSPHATE SERPL-MCNC: 3.8 MG/DL (ref 2.7–4.5)
PLATELET # BLD AUTO: 300 K/UL (ref 150–350)
PMV BLD AUTO: 10.4 FL (ref 9.2–12.9)
POCT GLUCOSE: 143 MG/DL (ref 70–110)
POCT GLUCOSE: 153 MG/DL (ref 70–110)
POTASSIUM SERPL-SCNC: 3.7 MMOL/L (ref 3.5–5.1)
PROT SERPL-MCNC: 7 G/DL (ref 6–8.4)
PROTHROMBIN TIME: 11.4 SEC (ref 9–12.5)
RBC # BLD AUTO: 3.34 M/UL (ref 4.6–6.2)
SODIUM SERPL-SCNC: 138 MMOL/L (ref 136–145)
WBC # BLD AUTO: 9.37 K/UL (ref 3.9–12.7)

## 2019-03-27 PROCEDURE — 63600175 PHARM REV CODE 636 W HCPCS: Performed by: INTERNAL MEDICINE

## 2019-03-27 PROCEDURE — 25000003 PHARM REV CODE 250: Performed by: STUDENT IN AN ORGANIZED HEALTH CARE EDUCATION/TRAINING PROGRAM

## 2019-03-27 PROCEDURE — 25000003 PHARM REV CODE 250: Performed by: PSYCHIATRY & NEUROLOGY

## 2019-03-27 PROCEDURE — 99222 1ST HOSP IP/OBS MODERATE 55: CPT | Mod: ,,, | Performed by: PSYCHIATRY & NEUROLOGY

## 2019-03-27 PROCEDURE — 83735 ASSAY OF MAGNESIUM: CPT

## 2019-03-27 PROCEDURE — 99232 SBSQ HOSP IP/OBS MODERATE 35: CPT | Mod: ,,, | Performed by: INTERNAL MEDICINE

## 2019-03-27 PROCEDURE — 99232 PR SUBSEQUENT HOSPITAL CARE,LEVL II: ICD-10-PCS | Mod: ,,, | Performed by: INTERNAL MEDICINE

## 2019-03-27 PROCEDURE — 25000003 PHARM REV CODE 250: Performed by: PHYSICIAN ASSISTANT

## 2019-03-27 PROCEDURE — 93005 ELECTROCARDIOGRAM TRACING: CPT

## 2019-03-27 PROCEDURE — 80053 COMPREHEN METABOLIC PANEL: CPT

## 2019-03-27 PROCEDURE — 85610 PROTHROMBIN TIME: CPT

## 2019-03-27 PROCEDURE — 25000003 PHARM REV CODE 250: Performed by: INTERNAL MEDICINE

## 2019-03-27 PROCEDURE — 93010 ELECTROCARDIOGRAM REPORT: CPT | Mod: ,,, | Performed by: INTERNAL MEDICINE

## 2019-03-27 PROCEDURE — 84100 ASSAY OF PHOSPHORUS: CPT

## 2019-03-27 PROCEDURE — 25000003 PHARM REV CODE 250: Performed by: NURSE PRACTITIONER

## 2019-03-27 PROCEDURE — 99222 PR INITIAL HOSPITAL CARE,LEVL II: ICD-10-PCS | Mod: ,,, | Performed by: PSYCHIATRY & NEUROLOGY

## 2019-03-27 PROCEDURE — 85025 COMPLETE CBC W/AUTO DIFF WBC: CPT

## 2019-03-27 PROCEDURE — 93010 EKG 12-LEAD: ICD-10-PCS | Mod: ,,, | Performed by: INTERNAL MEDICINE

## 2019-03-27 PROCEDURE — 20600001 HC STEP DOWN PRIVATE ROOM

## 2019-03-27 RX ORDER — MIRTAZAPINE 7.5 MG/1
7.5 TABLET, FILM COATED ORAL NIGHTLY
Status: DISCONTINUED | OUTPATIENT
Start: 2019-03-27 | End: 2019-03-29 | Stop reason: HOSPADM

## 2019-03-27 RX ORDER — METOPROLOL TARTRATE 25 MG/1
12.5 TABLET ORAL 2 TIMES DAILY
Status: DISCONTINUED | OUTPATIENT
Start: 2019-03-28 | End: 2019-03-29 | Stop reason: HOSPADM

## 2019-03-27 RX ORDER — MAGNESIUM SULFATE HEPTAHYDRATE 40 MG/ML
2 INJECTION, SOLUTION INTRAVENOUS ONCE
Status: COMPLETED | OUTPATIENT
Start: 2019-03-27 | End: 2019-03-27

## 2019-03-27 RX ADMIN — THERA TABS 1 TABLET: TAB at 09:03

## 2019-03-27 RX ADMIN — APIXABAN 5 MG: 5 TABLET, FILM COATED ORAL at 09:03

## 2019-03-27 RX ADMIN — MIRTAZAPINE 7.5 MG: 7.5 TABLET ORAL at 08:03

## 2019-03-27 RX ADMIN — DILTIAZEM HYDROCHLORIDE 120 MG: 120 CAPSULE, COATED, EXTENDED RELEASE ORAL at 09:03

## 2019-03-27 RX ADMIN — POTASSIUM BICARBONATE 25 MEQ: 978 TABLET, EFFERVESCENT ORAL at 11:03

## 2019-03-27 RX ADMIN — MICONAZOLE NITRATE: 20 POWDER TOPICAL at 08:03

## 2019-03-27 RX ADMIN — LACTULOSE 20 G: 20 SOLUTION ORAL at 08:03

## 2019-03-27 RX ADMIN — LACTULOSE 20 G: 20 SOLUTION ORAL at 09:03

## 2019-03-27 RX ADMIN — LACTULOSE 20 G: 20 SOLUTION ORAL at 03:03

## 2019-03-27 RX ADMIN — MAGNESIUM SULFATE IN WATER 2 G: 40 INJECTION, SOLUTION INTRAVENOUS at 11:03

## 2019-03-27 RX ADMIN — APIXABAN 5 MG: 5 TABLET, FILM COATED ORAL at 08:03

## 2019-03-27 RX ADMIN — ALUMINUM HYDROXIDE, MAGNESIUM HYDROXIDE, AND SIMETHICONE: 200; 200; 20 SUSPENSION ORAL at 01:03

## 2019-03-27 RX ADMIN — MICONAZOLE NITRATE: 20 POWDER TOPICAL at 11:03

## 2019-03-27 RX ADMIN — ACETAMINOPHEN 650 MG: 325 TABLET ORAL at 10:03

## 2019-03-27 RX ADMIN — FOLIC ACID 1 MG: 1 TABLET ORAL at 09:03

## 2019-03-27 RX ADMIN — METOPROLOL TARTRATE 25 MG: 25 TABLET ORAL at 09:03

## 2019-03-27 RX ADMIN — Medication 100 MG: at 09:03

## 2019-03-27 RX ADMIN — CYANOCOBALAMIN TAB 1000 MCG 1000 MCG: 1000 TAB at 09:03

## 2019-03-27 NOTE — ASSESSMENT & PLAN NOTE
· Patient has been treated for acute alcohol withdrawal, not presently symptomatic  · Recommend continued treatment with thiamine 100mg po qdaily, would offer IV if patient has IV access; has received 4 days IV tx during stay  · Patient motivated for rehab, AA, no prior treatment in these settings; recommend dispo to residential rehab if possible   · Resource sheet for rehab and ABU provided to patient and family

## 2019-03-27 NOTE — ASSESSMENT & PLAN NOTE
Patient reports struggling with anxiety for years and reports previous hx of panic attacks, as well as depression. Reports previous trials of Cymbalta and Effexor. Suspect SIMD as patient has been drinking alcohol for years and cannot report one period of sobriety.     · Start Remeron 7.5mg PO QHS for depression (off-label use for anxiety).    Patient is on Eliquis, but Remeron is not an SSRI and therefore does not increase risk of bleeding.

## 2019-03-27 NOTE — SUBJECTIVE & OBJECTIVE
Patient History           Medical as of 3/27/2019     Past Medical History     Diagnosis Date Comments Source    A-fib -- -- Provider    Alcohol abuse -- -- Provider    Arthritis -- -- Provider    Chronic gout -- -- Provider    Diabetes mellitus -- -- Provider    DM (diabetes mellitus) 11/16/2016 -- Provider    Fatty liver -- -- Provider    Hyperlipidemia -- -- Provider    Renal cell cancer 2014 -- Provider                  Surgical as of 3/27/2019     Past Surgical History     Procedure Laterality Date Comments Source    ABSCESS DRAINAGE -- -- perirectal Provider    HAND SURGERY Left -- -- Provider    PARTIAL NEPHRECTOMY Left August 2014 -- Provider    COLONOSCOPY -- -- -- Provider    KIDNEY SURGERY -- -- partial left kidney removal - [place] Provider                  Family as of 3/27/2019     Problem Relation Name Age of Onset Comments Source    Lung cancer Father -- -- -- Provider    Cancer Father -- -- -- Provider    Diabetes Mother -- -- -- Provider    Lung cancer Sister -- -- -- Provider    Colon polyps Brother -- -- -- Provider    Cirrhosis Neg Hx -- -- -- Provider            Tobacco Use as of 3/27/2019     Smoking Status Smoking Start Date Smoking Quit Date Packs/Day Years Used    Never Smoker -- -- -- --    Types Comments Smokeless Tobacco Status Smokeless Tobacco Quit Date Source    -- -- Never Used -- Provider            Alcohol Use as of 3/27/2019     Alcohol Use Drinks/Week Alcohol/Week Comments Source    Yes 7 Cans of beer 4.2 oz patient states he drinks less than he use to Provider    Frequency Standard Drinks Binge Drinking        -- -- --              Drug Use as of 3/27/2019     Drug Use Types Frequency Comments Source    No -- -- -- Provider            Sexual Activity as of 3/27/2019     Sexually Active Birth Control Partners Comments Source    Not Asked -- Female -- Provider            Activities of Daily Living as of 3/27/2019    None           Social Documentation as of 3/27/2019    None            Occupational as of 3/27/2019     Occupation Employer Comments Source    -- [employer] -- Provider            Socioeconomic as of 3/27/2019     Marital Status Spouse Name Number of Children Years Education Education Level Preferred Language Ethnicity Race Source    Single -- -- -- -- English /White White Provider    Financial Resource Strain Food Insecurity: Worry Food Insecurity: Inability Transportation Needs: Medical Transportation Needs: Non-medical    -- -- -- -- --            Pertinent History     Question Response Comments    Lives with -- --    Place in Birth Order -- --    Lives in -- --    Number of Siblings -- --    Raised by -- --    Legal Involvement -- --    Childhood Trauma -- --    Criminal History of -- --    Financial Status -- --    Highest Level of Education -- --    Does patient have access to a firearm? -- --     Service -- --    Primary Leisure Activity -- --    Spirituality -- --        Past Medical History:   Diagnosis Date    A-fib     Alcohol abuse     Arthritis     Chronic gout     Diabetes mellitus     DM (diabetes mellitus) 11/16/2016    Fatty liver     Hyperlipidemia     Renal cell cancer 2014     Past Surgical History:   Procedure Laterality Date    ABSCESS DRAINAGE      perirectal    COLONOSCOPY      FISTULOTOMY N/A 5/19/2015    Performed by Shane Hughes [facility] at [facility] [address] [place] [address]    HAND SURGERY Left     HEMORRHOIDECTOMY N/A 5/19/2015    Performed by Shane Hughes MD at [facility] [address] [place] [address]    KIDNEY SURGERY      partial left kidney removal - [place]    PARTIAL NEPHRECTOMY Left August 2014    ROBOTIC ASSISTED LAPAROSCOPIC NEPHRECTOMY PARTIAL Left 8/28/2014    Performed by Fabian Estevez MD at [facility] [address] [place] [address]     Family History     Problem Relation (Age of Onset)    Cancer Father    Colon polyps Brother    Diabetes Mother    Lung cancer Father, Sister        Tobacco Use    Smoking status: Never Smoker    Smokeless tobacco: Never  Used   Substance and Sexual Activity    Alcohol use: Yes     Alcohol/week: 4.2 oz     Types: 7 Cans of beer per week     Comment: patient states he drinks less than he use to    Drug use: No    Sexual activity: Not on file     Review of patient's allergies indicates:   Allergen Reactions    No known drug allergies        No current facility-administered medications on file prior to encounter.      Current Outpatient Medications on File Prior to Encounter   Medication Sig    allopurinol (ZYLOPRIM) 100 MG tablet TAKE 2 TABLETS BY MOUTH DAILY    apixaban (ELIQUIS) 5 mg Tab Take 1 tablet (5 mg total) by mouth 2 (two) times daily.    diltiaZEM (DILACOR XR) 120 MG CDCR Take 1 capsule (120 mg total) by mouth once daily.    fenofibrate 160 MG Tab Take 1 tablet (160 mg total) by mouth every evening.    flecainide (TAMBOCOR) 150 MG Tab Take 1 tablet (150 mg total) by mouth 2 (two) times daily.    LORazepam (ATIVAN) 0.5 MG tablet TAKE 1 TABLET BY MOUTH EVERY 12 HOURS AS NEEDED FOR ANXIETY    metoprolol tartrate (LOPRESSOR) 25 MG tablet Take 1 tablet (25 mg total) by mouth 2 (two) times daily as needed.    omega-3 acid ethyl esters (LOVAZA) 1 gram capsule Take 2 g by mouth 2 (two) times daily.    ranitidine (ZANTAC) 150 MG capsule Take 150 mg by mouth 2 (two) times daily.    rosuvastatin (CRESTOR) 10 MG tablet Take 1 tablet (10 mg total) by mouth once daily.    aspirin 81 MG Chew Take 1 tablet (81 mg total) by mouth once daily.     Psychotherapeutics (From admission, onward)    Start     Stop Route Frequency Ordered    03/23/19 2100  QUEtiapine tablet 25 mg      -- Oral Nightly 03/23/19 1959        Review of Systems   All other systems reviewed and are negative.    Strengths and Liabilities: Strength: Patient is expressive/articulate., Strength: Patient is motivated for change., Strength: Patient has positive support network., Liability: Patient has poor health.    Objective:     Vital Signs (Most Recent):  Temp:  "98.9 °F (37.2 °C) (03/26/19 2349)  Pulse: 61 (03/26/19 2349)  Resp: 18 (03/26/19 2349)  BP: 116/65 (03/26/19 2349)  SpO2: 98 % (03/26/19 2349) Vital Signs (24h Range):  Temp:  [98.9 °F (37.2 °C)-100.9 °F (38.3 °C)] 98.9 °F (37.2 °C)  Pulse:  [61-82] 61  Resp:  [18-20] 18  SpO2:  [96 %-100 %] 98 %  BP: (116-130)/(58-78) 116/65     Height: 5' 5.98" (167.6 cm)  Weight: 79.5 kg (175 lb 4.3 oz)  Body mass index is 28.31 kg/m².      Intake/Output Summary (Last 24 hours) at 3/27/2019 0125  Last data filed at 3/26/2019 1200  Gross per 24 hour   Intake 450 ml   Output 200 ml   Net 250 ml       Physical Exam   Psychiatric:   Mental Status Exam:  Appearance: unremarkable, age appropriate  Level of Consciousness: awake  Behavior/Cooperation: friendly and cooperative  Psychomotor: unremarkable   Speech: normal tone, normal rate, normal pitch, loud   Language: english, fluid  Orientation: person, place, situation, time/date  Attention Span/Concentration: unable to spell "WORLD" backwards  Memory: Registers 3/3 and recalls 1/3 objects at 0 and 5 minutes  Mood: "great"  Affect: reactive, mood congruent   Thought Process: linear, normal and logical  Associations: normal and logical  Thought Content: normal, no suicidality, no homicidality, delusions, or paranoia  Fund of Knowledge: Aware of current events  Abstraction: proverbs were abstract  Insight: good  Judgment: good     Nursing note and vitals reviewed.       Significant Labs:   Last 24 Hours:   Recent Lab Results       03/26/19  2309   03/26/19  1710   03/26/19  1110   03/26/19  0731   03/26/19  0512        Immature Grans (Abs)         0.13  Comment:  Mild elevation in immature granulocytes is non specific and   can be seen in a variety of conditions including stress response,   acute inflammation, trauma and pregnancy. Correlation with other   laboratory and clinical findings is essential.       Albumin               Alkaline Phosphatase               ALT               Anion " Gap               AST               Baso #         0.14     Basophil%         1.2     Total Bilirubin               BUN, Bld               Calcium               Chloride               CO2               Creatinine               Differential Method         Automated     eGFR if                eGFR if non                Eos #         0.1     Eosinophil%         0.6     Estimated Avg Glucose         148     Glucose               Gran # (ANC)         9.0     Gran%         76.7     Hematocrit         33.7     Hemoglobin         11.1     Hemoglobin A1C External         6.8  Comment:  ADA Screening Guidelines:  5.7-6.4%  Consistent with prediabetes  >or=6.5%  Consistent with diabetes  High levels of fetal hemoglobin interfere with the HbA1C  assay. Heterozygous hemoglobin variants (HbS, HgC, etc)do  not significantly interfere with this assay.   However, presence of multiple variants may affect accuracy.       Immature Granulocytes         1.1     Coumadin Monitoring INR         1.1  Comment:  Coumadin Therapy:  2.0 - 3.0 for INR for all indicators except mechanical heart valves  and antiphospholipid syndromes which should use 2.5 - 3.5.       Lymph #         1.6     Lymph%         13.9     Magnesium               MCH         32.5     MCHC         32.9     MCV         99     Mono #         0.8     Mono%         6.5     MPV         10.3     nRBC         0     Phosphorus               Platelets         256     POCT Glucose 141 156 155 122       Potassium               Total Protein               Protime         11.2     RBC         3.42     RDW         12.8     Sodium               WBC         11.78                      03/26/19  0511        Immature Grans (Abs)       Albumin 3.4     Alkaline Phosphatase 76     ALT 70     Anion Gap 14     AST 64     Baso #       Basophil%       Total Bilirubin 1.1  Comment:  For infants and newborns, interpretation of results should be based  on gestational  age, weight and in agreement with clinical  observations.  Premature Infant recommended reference ranges:  Up to 24 hours.............<8.0 mg/dL  Up to 48 hours............<12.0 mg/dL  3-5 days..................<15.0 mg/dL  6-29 days.................<15.0 mg/dL       BUN, Bld 5     Calcium 10.3     Chloride 99     CO2 23     Creatinine 0.8     Differential Method       eGFR if African American >60.0     eGFR if non  >60.0  Comment:  Calculation used to obtain the estimated glomerular filtration  rate (eGFR) is the CKD-EPI equation.        Eos #       Eosinophil%       Estimated Avg Glucose       Glucose 118     Gran # (ANC)       Gran%       Hematocrit       Hemoglobin       Hemoglobin A1C External       Immature Granulocytes       Coumadin Monitoring INR       Lymph #       Lymph%       Magnesium 1.5     MCH       MCHC       MCV       Mono #       Mono%       MPV       nRBC       Phosphorus 3.2     Platelets       POCT Glucose       Potassium 3.1     Total Protein 7.6     Protime       RBC       RDW       Sodium 136     WBC             Significant Imaging: I have reviewed all pertinent imaging results/findings within the past 24 hours.

## 2019-03-27 NOTE — PLAN OF CARE
03/27/19 1522   Discharge Reassessment   Assessment Type Discharge Planning Reassessment   Discharge Plan A Skilled Nursing Facility   Discharge Plan B Home;Home with family   DME Needed Upon Discharge  other (see comments)  (TBD)
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
## 2019-03-27 NOTE — HOSPITAL COURSE
"  3/28/2019   Chart reviewed. Patient has been medication compliant. Received no psychiatric PRNs in the past 24 hours. Patient calm and cooperative with interview. Mood is "alright, except I had heartburn last night". Reports improvement in sleep with initiation of Remeron last night, and believes he slept about 6 hours until his heartburn woke him up. No issues with starting Remeron and is interested in taking the medication after discharge. Denies anxiety or sadness on interview. Denies SI, HI, AVH. Reports eating well. No other somatic complaints, no other complaints during interview. CAM-ICU negative. Oriented x 4.      "
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
## 2019-03-27 NOTE — MEDICAL/APP STUDENT
"Medical Student Progress Note  Consult-Liaison Psychiatry    Admit Date: 3/15/2019   LOS: 12 days     LEGAL STATUS: IP    SUBJECTIVE:     Interim Events:   In interview the patient is awake and oriented to person, place and time. He is laying in bed, talking and cooperating with the interview. He is feeling "good" today. Denies any hallucinations, anxiety, nausea, vomiting and diaphoresis. He feels like he is "with it" today. As per the daughter who was in the room during the interview, he is much better today. CAM-ICU negative.   The patient states that he tried quitting alcohol 3.5 weeks ago because he was feeling bad. He has tried in the past, states that the first time was 8 weeks ago. He started to get tremors and returned to drinking. He denies any seizures.  As per the patient and the daughter, the patient has been drinking since he was 14. It got much worse when he retired almost five years ago and as per the daughter, it has worsened over the past year. The past two months he has left the house only to get more alcohol and has not been participating in his normal activities. The patient says that he was drinking about 18 drinks per day.   The daughter believes that he is drinking more because he has nothing to do since retiring and because his knee hurts him. She also thinks that his anxiety and depression have gotten worse and he uses drinking as a coping mechanism. The patient has had anxiety all his life, worsening over the past few years. His depression has also worsened. He has tried Ativan in the past but was taken off of it because, as daughter states, he was addicted to it. He has also tried Effexor, Lexapro and Cymbalta. The daughter states that he was never on them long enough for them to work. She saw a difference with Cymbalta but thinks he stopped taking it because of the side effects.   The patient denies suicidal ideation and homicidal ideation. He says that he had it in the past, a couple of " "months ago, and told his doctor. He has not felt that way since.   He does not want to go to inpatient rehab but wants to try outpatient.     PMHx  Past Medical History Reviewed    REVIEW OF SYSTEMS  Not reviewed         Scheduled Meds:   apixaban  5 mg Oral BID    cyanocobalamin  1,000 mcg Oral Daily    diltiaZEM  120 mg Oral Daily    folic acid  1 mg Oral Daily    lactulose  20 g Oral TID    magnesium sulfate IVPB  2 g Intravenous Once    metoprolol tartrate  25 mg Oral BID    miconazole NITRATE 2 %   Topical (Top) BID    multivitamin  1 tablet Oral Daily    potassium bicarbonate  25 mEq Oral Once    QUEtiapine  25 mg Oral QHS    thiamine  100 mg Oral Daily     PRN Meds:acetaminophen, albuterol-ipratropium, dextrose 50%, dextrose 50%, glucagon (human recombinant), glucose, glucose, insulin aspart U-100      Review of patient's allergies indicates:   Allergen Reactions    No known drug allergies              OBJECTIVE:     Vital Signs (Most Recent)  Temp: 98.7 °F (37.1 °C) (03/27/19 0327)  Pulse: 78 (03/27/19 0806)  Resp: 18 (03/27/19 0327)  BP: (!) 111/53 (03/27/19 0327)  Weight: 79.5 kg (175 lb 4.3 oz) (03/26/19 0151)  BMI (Calculated): 32.5 (03/22/19 1100)      Mental Status Exam:  Appearance: age appropriate, well nourished, unremarkable  Behavior/Cooperation: friendly and cooperative, eye contact normal  Speech: normal tone, normal rate, normal pitch, normal volume  Mood: great  Affect: normal and mood-incongruent  Thought Process: normal and logical  Thought Content: normal, no suicidality, no homicidality, delusions, or paranoia   Orientation: grossly intact  Memory: Grossly intact  Attention Span/Concentration: Normal  Insight: fair  Judgment: fair    CAM-ICU: Negative  1. Acute change and/or fluctuating course of mental status: Yes   2. Inattention (SAVEAHAART): No  · "Squeeze my hand, only when you hear, the letter 'A'."  3. Altered Level of Consciousness: No  4. Disorganized Thinking (Errors " ">1/6): No  · "Will a stone float on water?"  · "Are there fish in the sea?"  · "Does one pound weigh more than two?"  · "Can you use a hammer to pound a nail?"  · Command(s):  · "Hold up 2 fingers."  · "Now do the same thing with the other hand."          Labs/Imaging  Recent Results (from the past 48 hour(s))   POCT glucose    Collection Time: 03/25/19 11:33 AM   Result Value Ref Range    POCT Glucose 164 (H) 70 - 110 mg/dL   POCT glucose    Collection Time: 03/25/19  9:58 PM   Result Value Ref Range    POCT Glucose 146 (H) 70 - 110 mg/dL   Comprehensive metabolic panel    Collection Time: 03/26/19  5:11 AM   Result Value Ref Range    Sodium 136 136 - 145 mmol/L    Potassium 3.1 (L) 3.5 - 5.1 mmol/L    Chloride 99 95 - 110 mmol/L    CO2 23 23 - 29 mmol/L    Glucose 118 (H) 70 - 110 mg/dL    BUN, Bld 5 (L) 8 - 23 mg/dL    Creatinine 0.8 0.5 - 1.4 mg/dL    Calcium 10.3 8.7 - 10.5 mg/dL    Total Protein 7.6 6.0 - 8.4 g/dL    Albumin 3.4 (L) 3.5 - 5.2 g/dL    Total Bilirubin 1.1 (H) 0.1 - 1.0 mg/dL    Alkaline Phosphatase 76 55 - 135 U/L    AST 64 (H) 10 - 40 U/L    ALT 70 (H) 10 - 44 U/L    Anion Gap 14 8 - 16 mmol/L    eGFR if African American >60.0 >60 mL/min/1.73 m^2    eGFR if non African American >60.0 >60 mL/min/1.73 m^2   Magnesium    Collection Time: 03/26/19  5:11 AM   Result Value Ref Range    Magnesium 1.5 (L) 1.6 - 2.6 mg/dL   Phosphorus    Collection Time: 03/26/19  5:11 AM   Result Value Ref Range    Phosphorus 3.2 2.7 - 4.5 mg/dL   Protime-INR    Collection Time: 03/26/19  5:12 AM   Result Value Ref Range    Prothrombin Time 11.2 9.0 - 12.5 sec    INR 1.1 0.8 - 1.2   CBC auto differential    Collection Time: 03/26/19  5:12 AM   Result Value Ref Range    WBC 11.78 3.90 - 12.70 K/uL    RBC 3.42 (L) 4.60 - 6.20 M/uL    Hemoglobin 11.1 (L) 14.0 - 18.0 g/dL    Hematocrit 33.7 (L) 40.0 - 54.0 %    MCV 99 (H) 82 - 98 fL    MCH 32.5 (H) 27.0 - 31.0 pg    MCHC 32.9 32.0 - 36.0 g/dL    RDW 12.8 11.5 - 14.5 %    " Platelets 256 150 - 350 K/uL    MPV 10.3 9.2 - 12.9 fL    Immature Granulocytes 1.1 (H) 0.0 - 0.5 %    Gran # (ANC) 9.0 (H) 1.8 - 7.7 K/uL    Immature Grans (Abs) 0.13 (H) 0.00 - 0.04 K/uL    Lymph # 1.6 1.0 - 4.8 K/uL    Mono # 0.8 0.3 - 1.0 K/uL    Eos # 0.1 0.0 - 0.5 K/uL    Baso # 0.14 0.00 - 0.20 K/uL    nRBC 0 0 /100 WBC    Gran% 76.7 (H) 38.0 - 73.0 %    Lymph% 13.9 (L) 18.0 - 48.0 %    Mono% 6.5 4.0 - 15.0 %    Eosinophil% 0.6 0.0 - 8.0 %    Basophil% 1.2 0.0 - 1.9 %    Differential Method Automated    Hemoglobin A1c    Collection Time: 03/26/19  5:12 AM   Result Value Ref Range    Hemoglobin A1C 6.8 (H) 4.0 - 5.6 %    Estimated Avg Glucose 148 (H) 68 - 131 mg/dL   POCT glucose    Collection Time: 03/26/19  7:31 AM   Result Value Ref Range    POCT Glucose 122 (H) 70 - 110 mg/dL   POCT glucose    Collection Time: 03/26/19 11:10 AM   Result Value Ref Range    POCT Glucose 155 (H) 70 - 110 mg/dL   POCT glucose    Collection Time: 03/26/19  5:10 PM   Result Value Ref Range    POCT Glucose 156 (H) 70 - 110 mg/dL   POCT glucose    Collection Time: 03/26/19 11:09 PM   Result Value Ref Range    POCT Glucose 141 (H) 70 - 110 mg/dL   Comprehensive metabolic panel    Collection Time: 03/27/19  5:35 AM   Result Value Ref Range    Sodium 138 136 - 145 mmol/L    Potassium 3.7 3.5 - 5.1 mmol/L    Chloride 102 95 - 110 mmol/L    CO2 23 23 - 29 mmol/L    Glucose 136 (H) 70 - 110 mg/dL    BUN, Bld 8 8 - 23 mg/dL    Creatinine 1.0 0.5 - 1.4 mg/dL    Calcium 10.1 8.7 - 10.5 mg/dL    Total Protein 7.0 6.0 - 8.4 g/dL    Albumin 3.2 (L) 3.5 - 5.2 g/dL    Total Bilirubin 0.9 0.1 - 1.0 mg/dL    Alkaline Phosphatase 75 55 - 135 U/L     (H) 10 - 40 U/L    ALT 97 (H) 10 - 44 U/L    Anion Gap 13 8 - 16 mmol/L    eGFR if African American >60.0 >60 mL/min/1.73 m^2    eGFR if non African American >60.0 >60 mL/min/1.73 m^2   Magnesium    Collection Time: 03/27/19  5:35 AM   Result Value Ref Range    Magnesium 1.5 (L) 1.6 - 2.6 mg/dL    Protime-INR    Collection Time: 03/27/19  5:35 AM   Result Value Ref Range    Prothrombin Time 11.4 9.0 - 12.5 sec    INR 1.1 0.8 - 1.2   CBC auto differential    Collection Time: 03/27/19  5:35 AM   Result Value Ref Range    WBC 9.37 3.90 - 12.70 K/uL    RBC 3.34 (L) 4.60 - 6.20 M/uL    Hemoglobin 10.7 (L) 14.0 - 18.0 g/dL    Hematocrit 33.7 (L) 40.0 - 54.0 %     (H) 82 - 98 fL    MCH 32.0 (H) 27.0 - 31.0 pg    MCHC 31.8 (L) 32.0 - 36.0 g/dL    RDW 13.0 11.5 - 14.5 %    Platelets 300 150 - 350 K/uL    MPV 10.4 9.2 - 12.9 fL    Immature Granulocytes 1.3 (H) 0.0 - 0.5 %    Gran # (ANC) 6.5 1.8 - 7.7 K/uL    Immature Grans (Abs) 0.12 (H) 0.00 - 0.04 K/uL    Lymph # 1.6 1.0 - 4.8 K/uL    Mono # 0.9 0.3 - 1.0 K/uL    Eos # 0.1 0.0 - 0.5 K/uL    Baso # 0.13 0.00 - 0.20 K/uL    nRBC 0 0 /100 WBC    Gran% 68.8 38.0 - 73.0 %    Lymph% 17.5 (L) 18.0 - 48.0 %    Mono% 9.6 4.0 - 15.0 %    Eosinophil% 1.4 0.0 - 8.0 %    Basophil% 1.4 0.0 - 1.9 %    Differential Method Automated    Phosphorus    Collection Time: 03/27/19  5:35 AM   Result Value Ref Range    Phosphorus 3.8 2.7 - 4.5 mg/dL   POCT glucose    Collection Time: 03/27/19  8:11 AM   Result Value Ref Range    POCT Glucose 153 (H) 70 - 110 mg/dL      No results found for: PHENYTOIN, PHENOBARB, VALPROATE, CBMZ  No results found for: LITHIUM        ASSESSMENT/PLAN:     Donald Warren Abadie is a 63 y.o. male with a past psychiatric history of alcohol use disorder, who presented to the [facility] [place], [place] due to acute metabolic encephalopathy. Psychiatry was consulted to address the patient's symptoms of delirium, alcohol use/withdrawal, depression and anxiety.     IMPRESSION  Alcohol withdrawal resolving. He is alert, oriented and able to attend interview on 3/27 in the morning.     RECOMMENDATION(S)      1. Scheduled Medication(s):  None    2. PRN Medication(s):  Haldol 5mg IV    3.  Monitor:  Daily EKG to monitor QTc, would stop Haldol if above 500.     4. Legal  Status/Precaution(s):  Patient does not meet criteria for PEC or inpatient psychiatric admission at this time.     5. Other:   CAM ICU- Negative    Need for continued hospitalization:  awaiting disposition plan    Target Disposition:  home         Sherron [facility] Medical [facility]

## 2019-03-27 NOTE — ASSESSMENT & PLAN NOTE
Patient reports struggling with anxiety for years and reports previous hx of panic attacks, as well as depression. Reports previous trials of Cymbalta and Effexor. Suspect SIMD as patient has been drinking alcohol for years and cannot report one period of sobriety.     On interview today, patient reports improved sleep with initiation of Remeron last night. Interested in continuing the medication after discharge. Denies any feelings of depression or anxiety.     · Remeron 7.5mg PO QHS for depression and off-label use for anxiety   Patient is on Eliquis, but Remeron is not an SSRI and therefore does not increase risk of bleeding.  · Discontinue Seroquel (Remeron to replace to assist with insomnia)

## 2019-03-27 NOTE — PLAN OF CARE
Problem: Occupational Therapy Goal  Goal: Occupational Therapy Goal  Goals to be met by: 4/9/19  Patient will increase functional independence with ADLs by performing:    UE Dressing with Modified [place].  LE Dressing with Modified [place].  Grooming while standing with Modified [place].  Toileting from toilet with Modified [place] for hygiene and clothing management.   Step transfer with Modified [place]  Upper extremity exercise program x15 reps per handout, with independence.    Outcome: Ongoing (interventions implemented as appropriate)  Pt progressing well towards goals and demonstrates significant improvement since evaluation. Rec SNF vs HHOT 2/2 functional improvements.

## 2019-03-27 NOTE — PLAN OF CARE
Problem: Adult Inpatient Plan of Care  Goal: Plan of Care Review  Outcome: Ongoing (interventions implemented as appropriate)     03/27/19 0650   Plan of Care Review   Plan of Care Reviewed With patient;daughter   No acute events overnight.  Answered almost all questions appropriately.  Call light within reach.  Camera at bedside.  Bed alarm set.  Will continue to monitor.

## 2019-03-27 NOTE — PT/OT/SLP PROGRESS
Occupational Therapy   Treatment    Name: Donald Warren Abadie  MRN: [id number]  Admitting Diagnosis:  Acute metabolic encephalopathy       Recommendations:     Discharge Recommendations: (SNF vs HHOT)  Discharge Equipment Recommendations:  (TBD)  Barriers to discharge:  Decreased caregiver support    Assessment:     Donald Warren Abadie is a 63 y.o. male with a medical diagnosis of Acute metabolic encephalopathy.  He presents with improvements in bed mobility, gait stability and independence in ADLs. He continues with impaired STM and LTM stating various lengths of hospital stay (stated 5 weeks, 6 weeks, and 10 days even after family oriented to current length of hospital stay). Pt with decreased safety awareness but improving understanding of current deficits and safety precautions. Performance deficits affecting function are weakness, impaired functional mobilty, gait instability, impaired endurance, impaired self care skills, impaired balance, decreased safety awareness.     Rehab Prognosis:  Good; patient would benefit from acute skilled OT services to address these deficits and reach maximum level of function.       Plan:     Patient to be seen 4 x/week to address the above listed problems via self-care/home management, therapeutic activities, therapeutic exercises  · Plan of Care Expires: 04/23/19  · Plan of Care Reviewed with: patient    Subjective     Pain/Comfort:  · Pain Rating 1: 0/10    Objective:     Communicated with: nsg prior to session.  Patient found HOB elevated with peripheral IV upon OT entry to room.    General Precautions: Standard, fall, aspiration   Orthopedic Precautions:N/A   Braces: N/A     Occupational Performance:     Bed Mobility:    · Patient completed Rolling/Turning to Left with  supervision  · Patient completed Scooting/Bridging with supervision  · Patient completed Supine to Sit with supervision  · Patient completed Sit to Supine with supervision     Functional  Mobility/Transfers:  · Patient completed Sit <> Stand Transfer with contact guard assistance  with  hand-held assist   · Patient completed Bed <> Chair Transfer using Step Transfer technique with contact guard assistance with hand-held assist  Functional Mobility: Pt with fair dynamic seated and standing balance.      Activities of Daily Living:  · Grooming: stand by assistance in stance at sink to wash hands  · Lower Body Dressing: stand by assistance to don/doff B socks seated EOB      AMPAC 6 Click ADL: 21    Treatment & Education:  Pt educated on OT goals    Patient left up in chair with all lines intact, call button in reach, nsg notified and family presentEducation:      GOALS:   Multidisciplinary Problems     Occupational Therapy Goals        Problem: Occupational Therapy Goal    Goal Priority Disciplines Outcome Interventions   Occupational Therapy Goal     OT, PT/OT Ongoing (interventions implemented as appropriate)    Description:  Goals to be met by: 4/9/19  Patient will increase functional independence with ADLs by performing:    UE Dressing with Modified [place].  LE Dressing with Modified [place].  Grooming while standing with Modified [place].  Toileting from toilet with Modified [place] for hygiene and clothing management.   Step transfer with Modified [place]  Upper extremity exercise program x15 reps per handout, with independence.                      Time Tracking:     OT Date of Treatment: 03/26/19  OT Start Time: 1830  OT Stop Time: 1850  OT Total Time (min): 20 min    Billable Minutes:Self Care/Home Management 20    Columba Rush OT  3/26/2019

## 2019-03-27 NOTE — PROGRESS NOTES
[facility] Hospital Medicine  Progress Note    Patient Name: Donald Warren Abadie  MRN: [id number]  Patient Class: IP- Inpatient   Admission Date: 3/15/2019  Length of Stay: 12 days  Attending Physician: DEAN Vigil MD  Primary Care Provider: Bacilio Larry MD    [facility] Medicine Team: [facility] HOSP MED S [place] Vigil MD    Subjective:     Principal Problem:Acute metabolic encephalopathy    HPI:  Mr Abadie is a 64 y/o male with PMH of left sided RCC s/p partial nephrectomy (2014), paroxysmal AFib, EtOH abuse (PSYCH offered rehabilitation but declined), HTN, HLD, and fatty liver who presented to [facility] ED on 3/15 with fatigue/malaise. There was concern that he had taken an accidental overdose of BB and flecainide because he felt that he was having tachycardia and reported taking additional doses of his prescribed metoprolol several times in the preceding days. He had also being drinking and his daughter reports hallucinations before ED admission. In ED, the EKG showed severe bradycardia. Reportedly, the patient displayed symptoms of EtOH withdrawal including anxiety, diaphoresis, agitation and hallucinations. There was concerned about a seizure prior to the patient going into into cardiac arrest.  A TVP was placed for HR in the 40s, post-ROSC. Etiology of arrest unclear but felt to be likely severe bradycardia given BB overdose.The patient remained intubated until 3/21 and was on the Cardiology service until 3/22 when MICU was consulted to assume care. During this week of admission, he was treated for alcohol withdrawals and on propofol and Versed during that time and also given a librium taper. He intermittently experience Afib/flutter and has been on a heparin infusion. Additionally, he had intermittent fevers, unclear source but was given intermittent Cefepime, Vancomycin until this was also discontinued with cultures negative. Since extubation, the patient has experienced altered mental status  "of unclear etiology. He never had any focal deficits, and is oriented to place and person. The MICU assumed care on 3/22.   Hospital/ICU Course:  3/23- Continue precedex, wean as tolerated. MRI shows no acute change. Bedside swallow, dc amaya, central line and abx.   3/24: Precedex stopped, Seroquel started. Hypotensive and Tachycardic, NS bolus with improvement  Metoprolol, diltiazem restarted for Afib, intermittent RVR and he has tolerated well. Mental status slowly improving.       Interval History: Mr. Abadie is much more lucid and interactive today.  He complains of some "upset stomach and indigestion" last evening.  His daughter states that he was much less confused and agitated last night.  He denies any pain, fever, chills.  He was able to get up and walk around the room some, which made him happy.    Review of Systems   Constitutional: Positive for fatigue. Negative for chills and fever.   HENT: Negative for congestion.    Respiratory: Positive for cough. Negative for shortness of breath.    Cardiovascular: Negative for chest pain.   Gastrointestinal: Negative for abdominal pain, constipation, diarrhea, nausea and vomiting.   Neurological: Negative for dizziness and weakness.   Psychiatric/Behavioral: Positive for agitation, behavioral problems, confusion, decreased concentration and sleep disturbance. The patient is nervous/anxious.      Objective:     Vital Signs (Most Recent):  Temp: 98.1 °F (36.7 °C) (03/27/19 1417)  Pulse: (!) 53 (03/27/19 1417)  Resp: 16 (03/27/19 1417)  BP: (!) 117/59 (03/27/19 1417)  SpO2: 97 % (03/27/19 1417) Vital Signs (24h Range):  Temp:  [98.1 °F (36.7 °C)-99.2 °F (37.3 °C)] 98.1 °F (36.7 °C)  Pulse:  [53-78] 53  Resp:  [16-20] 16  SpO2:  [97 %-100 %] 97 %  BP: ()/(53-78) 117/59     Weight: 79.5 kg (175 lb 4.3 oz)  Body mass index is 28.31 kg/m².  No intake or output data in the 24 hours ending 03/27/19 2444   Physical Exam   Constitutional: He appears well-developed and " well-nourished. He appears listless.  Non-toxic appearance. He has a sickly appearance. He does not appear ill. No distress.   HENT:   Head: Normocephalic and atraumatic.   Nose: Nose normal.   Mouth/Throat: Oropharynx is clear and moist. Abnormal dentition. No oropharyngeal exudate.   Eyes: Pupils are equal, round, and reactive to light. EOM are normal. Right eye exhibits no discharge. Left eye exhibits no discharge. Right conjunctiva is injected. Left conjunctiva is injected. No scleral icterus.   Neck: Normal range of motion. Neck supple. No JVD present. No tracheal deviation present. No thyromegaly present.   Cardiovascular: Normal rate, regular rhythm, normal heart sounds and intact distal pulses. Exam reveals no gallop and no friction rub.   No murmur heard.  Pulmonary/Chest: Effort normal. No accessory muscle usage or stridor. No tachypnea and no bradypnea. No respiratory distress. He has no decreased breath sounds. He has no wheezes. He has no rhonchi. He has rales in the right lower field and the left lower field. He exhibits no tenderness.   Abdominal: Soft. Bowel sounds are normal. He exhibits no distension and no mass. There is no tenderness. There is no rebound and no guarding.   Truncal obesity   Genitourinary:   Genitourinary Comments: No amaya in place   Musculoskeletal: Normal range of motion. He exhibits no edema or tenderness.   Lymphadenopathy:     He has no cervical adenopathy.   No peripheral edema   Neurological: He appears listless. He is disoriented. He displays normal reflexes. No cranial nerve deficit. He exhibits normal muscle tone. Coordination normal. GCS eye subscore is 4. GCS verbal subscore is 5. GCS motor subscore is 6.   Alert and oriented 2/3, person/place only   Skin: Skin is warm and dry. No rash noted. No erythema. No pallor.   Psychiatric: Judgment normal. His speech is delayed. He is slowed. Cognition and memory are impaired. He exhibits a depressed mood. He exhibits abnormal  recent memory and abnormal remote memory.   Mr. Abadie is mildly confused, his alert to person and place now.  He is able to state that he is at [facility] in [place].   Nursing note and vitals reviewed.    ALL LABS, RADIOGRAPHS AND EKG'S PERSONALLY REVIEWED:  Significant Labs:   Recent Results (from the past 24 hour(s))   POCT glucose    Collection Time: 03/26/19  5:10 PM   Result Value Ref Range    POCT Glucose 156 (H) 70 - 110 mg/dL   POCT glucose    Collection Time: 03/26/19 11:09 PM   Result Value Ref Range    POCT Glucose 141 (H) 70 - 110 mg/dL   Comprehensive metabolic panel    Collection Time: 03/27/19  5:35 AM   Result Value Ref Range    Sodium 138 136 - 145 mmol/L    Potassium 3.7 3.5 - 5.1 mmol/L    Chloride 102 95 - 110 mmol/L    CO2 23 23 - 29 mmol/L    Glucose 136 (H) 70 - 110 mg/dL    BUN, Bld 8 8 - 23 mg/dL    Creatinine 1.0 0.5 - 1.4 mg/dL    Calcium 10.1 8.7 - 10.5 mg/dL    Total Protein 7.0 6.0 - 8.4 g/dL    Albumin 3.2 (L) 3.5 - 5.2 g/dL    Total Bilirubin 0.9 0.1 - 1.0 mg/dL    Alkaline Phosphatase 75 55 - 135 U/L     (H) 10 - 40 U/L    ALT 97 (H) 10 - 44 U/L    Anion Gap 13 8 - 16 mmol/L    eGFR if African American >60.0 >60 mL/min/1.73 m^2    eGFR if non African American >60.0 >60 mL/min/1.73 m^2   Magnesium    Collection Time: 03/27/19  5:35 AM   Result Value Ref Range    Magnesium 1.5 (L) 1.6 - 2.6 mg/dL   Protime-INR    Collection Time: 03/27/19  5:35 AM   Result Value Ref Range    Prothrombin Time 11.4 9.0 - 12.5 sec    INR 1.1 0.8 - 1.2   CBC auto differential    Collection Time: 03/27/19  5:35 AM   Result Value Ref Range    WBC 9.37 3.90 - 12.70 K/uL    RBC 3.34 (L) 4.60 - 6.20 M/uL    Hemoglobin 10.7 (L) 14.0 - 18.0 g/dL    Hematocrit 33.7 (L) 40.0 - 54.0 %     (H) 82 - 98 fL    MCH 32.0 (H) 27.0 - 31.0 pg    MCHC 31.8 (L) 32.0 - 36.0 g/dL    RDW 13.0 11.5 - 14.5 %    Platelets 300 150 - 350 K/uL    MPV 10.4 9.2 - 12.9 fL    Immature Granulocytes 1.3 (H) 0.0 - 0.5 %    Gran  # (ANC) 6.5 1.8 - 7.7 K/uL    Immature Grans (Abs) 0.12 (H) 0.00 - 0.04 K/uL    Lymph # 1.6 1.0 - 4.8 K/uL    Mono # 0.9 0.3 - 1.0 K/uL    Eos # 0.1 0.0 - 0.5 K/uL    Baso # 0.13 0.00 - 0.20 K/uL    nRBC 0 0 /100 WBC    Gran% 68.8 38.0 - 73.0 %    Lymph% 17.5 (L) 18.0 - 48.0 %    Mono% 9.6 4.0 - 15.0 %    Eosinophil% 1.4 0.0 - 8.0 %    Basophil% 1.4 0.0 - 1.9 %    Differential Method Automated    Phosphorus    Collection Time: 03/27/19  5:35 AM   Result Value Ref Range    Phosphorus 3.8 2.7 - 4.5 mg/dL   POCT glucose    Collection Time: 03/27/19  8:11 AM   Result Value Ref Range    POCT Glucose 153 (H) 70 - 110 mg/dL   POCT glucose    Collection Time: 03/27/19 12:29 PM   Result Value Ref Range    POCT Glucose 143 (H) 70 - 110 mg/dL     MELD-Na score: 7 at 3/27/2019  5:35 AM  MELD score: 7 at 3/27/2019  5:35 AM  Calculated from:  Serum Creatinine: 1.0 mg/dL at 3/27/2019  5:35 AM  Serum Sodium: 138 mmol/L (Rounded to 137 mmol/L) at 3/27/2019  5:35 AM  Total Bilirubin: 0.9 mg/dL (Rounded to 1 mg/dL) at 3/27/2019  5:35 AM  INR(ratio): 1.1 at 3/27/2019  5:35 AM  Age: 63 years     Significant Imaging:     None in the last 24 hours      Assessment/Plan:      Acute metabolic encephalopathy  -Likely multifactorial from ETOH abuse and withdrawal, delirium, arrest, intubation and sedation  -EEG on admission negative  -Delirium precautions  -CT on admission and on 3/22 shows no acute process  -MRI shows no acute abnormality and small remote left occipital lobe infarct  -thiamine tablet 100 mg, 100 mg, Oral, Daily  -QUEtiapine tablet 25 mg, 25 mg, Oral, QHS   -Daughter feels this is over sedating, so stopping  -Started Haldol 2mg po QHS on 3/26/19 with good results  -Required 5mg of IV Haldol night  Of 3/25/19 due to severe agitation  -Awaiting final Psychiatry consult recommendations from today as well    Bradycardia  -Likely from Diltiazem and Lopressor combo   -Reduce Lopressor 25mg--.>12.5mg PO BID   -Discontinue the  Diltiazem  -Mostly in 60's, but does drop to 40's at times  -Check EKG    ETOH abuse and addiction  -Psychiatry consult    Alcohol withdrawal, resolved  -Originally on Precedex infusion, then completed benzo taper    Hypokalemia  -potassium bicarbonate disintegrating tablet 50 mEq, 50 mEq, Oral, Once  -Keep K>4, Mg>2    Hypomagnesemia  -magnesium sulfate 2g in water 50mL IVPB (premix), 2 g, Intravenous, Once    Cardiopulmonary arrest  -Unsure of etiology for sure  -Thinking was overdose of home beta blocker induced severe bradycardia, then arrest   -Glucagon was given in ED       Paroxysmal atrial fibrillation  -Holding home flecainide  -Started running bradycardic on 3/27/19 to 40's:  -diltiaZEM 24 hr capsule 120 mg, 120 mg, Oral, Daily was discontinued after am dose on 3/27/19  -metoprolol tartrate (LOPRESSOR) tablet 25 mg Oral, BID reduced to 12.5mg on 3/27/19  -Anticoagulation as per below    Long term use of anticoagulation  -apixaban tablet 5 mg, 5 mg, Oral, BID      Diabetes mellitus without complication  -insulin aspart U-100 pen 0-5 Units, 0-5 Units, Subcutaneous, QID (AC + HS) PRN     Elevated LFTs  -lactulose 20 gram/30 mL solution Soln 20 g, 20 g, Oral, TID  -INR normal with low MELD: no high suspicion for severe cirrhosis    Macrocytic anemia  -Likely due to ETOH abuse  -Add MVI, Folic Acid 1mg po qday  -B12 1000mcg po QDAY        VTE Risk Mitigation (From admission, onward)        Ordered     apixaban tablet 5 mg  2 times daily      03/25/19 1058     IP VTE HIGH RISK PATIENT  Once      03/15/19 0847             WASHINGTON Vigil MD  Department of Hospital Medicine   [facility]

## 2019-03-27 NOTE — PROGRESS NOTES
"  [facility]  Adult Nutrition  Consult Note    SUMMARY     Recommendations    1. Continue current Cardiac diet.   2. RD to monitor & follow-up.    Goals: Meet % EEN, EPN  Nutrition Goal Status: goal met  Communication of RD Recs: reviewed with RN    Reason for Assessment    Reason For Assessment: RD follow-up  Diagnosis: other (see comments)(Alcohol withdrawl)  Relevant Medical History: Etoh abuse, DM  Interdisciplinary Rounds: did not attend    General Information Comments: Pt extubated 3/26, ST recommends regular diet w/ thin liquids. Pt tolerating current diet w/ adequate PO intake. TF discontinued. At initiation RD visit, family member at bedside reports pt w/ poor appetite "off and on" PTA. States when pt would "drink a lot of alcohol, his appetite would decrease." States UBW is around 185#, which chart review confirms. NFPE complete 3/19, pt w/ no physical signs of malnutrition.  Nutrition Discharge Planning: Adequate PO intake    Nutrition/Diet History    Patient Reported Diet/Restrictions/Preferences: general  Spiritual, Cultural Beliefs, Amish Practices, Values that Affect Care: no  Factors Affecting Nutritional Intake: None identified at this time    Anthropometrics    Temp: 98.7 °F (37.1 °C)  Height: 5' 5.98" (167.6 cm)  Height (inches): 65.98 in  Weight Method: Bed Scale  Weight: 79.5 kg (175 lb 4.3 oz)  Weight (lb): 175.27 lb  Ideal Body Weight (IBW), Male: 141.88 lb  % Ideal Body Weight, Male (lb): 123.53 lb  BMI (Calculated): 28.4  BMI Grade: 25 - 29.9 - overweight  Usual Body Weight (UBW), k kg  % Usual Body Weight: 107.49  % Weight Change From Usual Weight: 7.26 %    Lab/Procedures/Meds    Pertinent Labs Reviewed: reviewed  Pertinent Labs Comments: Stable  Pertinent Medications Reviewed: reviewed  Pertinent Medications Comments: MVI, Folic acid, Thaimine, Lactulose    Estimated/Assessed Needs    Weight Used For Calorie Calculations: 86.5 kg (190 lb 11.2 oz)(Dosing " wt)     Energy Calorie Requirements (kcal): 1996 kcal/d  Energy Need Method: [place]-St Jeor(1.25 PAL)     Protein Requirements:  g/d (1-1.2 g/kg)  Weight Used For Protein Calculations: 86.5 kg (190 lb 11.2 oz)     Estimated Fluid Requirement Method: other (see comments)(Per MD or 1 mL/kcal)     CHO Requirement: 50% total kcals    Nutrition Prescription Ordered    Current Diet Order: Cardiac  Current Nutrition Support Formula Ordered: Other (Comment)(Discontinued.)    Evaluation of Received Nutrient/Fluid Intake    Comments: LBM: 3/26    Tolerance: tolerating    Nutrition Risk    Level of Risk/Frequency of Follow-up: (1x/week)     Assessment and Plan     Nutrition Problem  Inadequate energy intake     Related to (etiology):   Inability to consume sufficient energy     Signs and Symptoms (as evidenced by):   NPO with no alternate means of nutrition      Nutrition Diagnosis Status:   Resolved     Monitor and Evaluation    Food and Nutrient Intake: energy intake, food and beverage intake  Food and Nutrient Adminstration: diet order  Physical Activity and Function: nutrition-related ADLs and IADLs  Anthropometric Measurements: weight, weight change  Biochemical Data, Medical Tests and Procedures: lipid profile, inflammatory profile, glucose/endocrine profile, gastrointestinal profile, electrolyte and renal panel  Nutrition-Focused Physical Findings: overall appearance     Nutrition Follow-Up    RD Follow-up?: Yes

## 2019-03-27 NOTE — ASSESSMENT & PLAN NOTE
Donald Warren Abadie is a 63 y.o. male with a past psychiatric history of alcohol use, currently presenting with Acute metabolic encephalopathy.  Psychiatry was originally consulted to address the patient's symptoms of delirium, though patient alert, oriented, and well able to attend on interview 3/26 evening and 3/27 day. CAM-ICU negative, and oriented x 4. Primary team came to see patient during psych interview, and agreed that patient's mentation was significantly improved compared to initial presentation. Reasonable to consider PRN treatment rather than scheduled, and given concerns of sedation with Seroquel reasonable to offer haldol as needed, though would recommend judicious use with careful monitoring as patient has history of QTc prolongation on prior EKGs.     IMPRESSION  Alcoholic Encephalopathy (resolving)    RECOMMENDATION(S)      1. Scheduled Medication(s):  None    2. PRN Medication(s):  Haldol 2mg PO/IM Q6H prn non-redirectable agitation      3.  Monitor:  Please obtain daily EKG to monitor QTc, would stop Haldol PRN if qtc>500.     4. Legal Status/Precaution(s):  Patient well able to understand his current medical situation, not a danger to himself, others, or gravely disabled on the basis of a psychiatric disorder presently.     5. Other:  CAM ICU - Negative  DELIRIUM BEHAVIOR MANAGEMENT  PLEASE utilize CHEMICAL restraints with PRN meds first for agitation. Minimize use of PHYSICAL restraints  Keep window shades open and room lit during day and room dim at night in order to promote normal sleep-wake cycles  Encourage family at bedside. [place] patient often to situation, location, date.  Continue to Limit or Discontinue use of Narcotics, Benzos and Anti-cholinergic medications as they may worsen delirium.  Continue medical workup for causative etiology of Delirium.

## 2019-03-27 NOTE — PLAN OF CARE
Problem: Adult Inpatient Plan of Care  Goal: Plan of Care Review  Outcome: Ongoing (interventions implemented as appropriate)     03/26/19 1926   Plan of Care Review   Plan of Care Reviewed With patient;family     Pt remains free from falls/injury. No acute events during shift. VSS, NAD. Bed in lowest position, wheels locked, side rails up times 2, call light w/in reach, family at bedside. Room clear of obstacles. Pt BG monitored AC/HS as per order. Pt covered with insulin aspart per low sliding scale. No S&S of hypo/hyperglycemia noticed. No skin breakdown noticed. Pt repositioned independently. Ambulated in [place] with PT. Will continue to monitor.

## 2019-03-28 LAB
ALBUMIN SERPL BCP-MCNC: 3 G/DL (ref 3.5–5.2)
ALP SERPL-CCNC: 74 U/L (ref 55–135)
ALT SERPL W/O P-5'-P-CCNC: 104 U/L (ref 10–44)
ANION GAP SERPL CALC-SCNC: 12 MMOL/L (ref 8–16)
AST SERPL-CCNC: 99 U/L (ref 10–40)
BASOPHILS # BLD AUTO: 0.13 K/UL (ref 0–0.2)
BASOPHILS NFR BLD: 1.7 % (ref 0–1.9)
BILIRUB SERPL-MCNC: 0.6 MG/DL (ref 0.1–1)
BUN SERPL-MCNC: 7 MG/DL (ref 8–23)
CALCIUM SERPL-MCNC: 9.5 MG/DL (ref 8.7–10.5)
CHLORIDE SERPL-SCNC: 103 MMOL/L (ref 95–110)
CO2 SERPL-SCNC: 22 MMOL/L (ref 23–29)
CREAT SERPL-MCNC: 0.8 MG/DL (ref 0.5–1.4)
DIFFERENTIAL METHOD: ABNORMAL
EOSINOPHIL # BLD AUTO: 0.2 K/UL (ref 0–0.5)
EOSINOPHIL NFR BLD: 2.3 % (ref 0–8)
ERYTHROCYTE [DISTWIDTH] IN BLOOD BY AUTOMATED COUNT: 13 % (ref 11.5–14.5)
EST. GFR  (AFRICAN AMERICAN): >60 ML/MIN/1.73 M^2
EST. GFR  (NON AFRICAN AMERICAN): >60 ML/MIN/1.73 M^2
GLUCOSE SERPL-MCNC: 148 MG/DL (ref 70–110)
HCT VFR BLD AUTO: 34.4 % (ref 40–54)
HGB BLD-MCNC: 11 G/DL (ref 14–18)
IMM GRANULOCYTES # BLD AUTO: 0.13 K/UL (ref 0–0.04)
IMM GRANULOCYTES NFR BLD AUTO: 1.7 % (ref 0–0.5)
INR PPP: 1.1 (ref 0.8–1.2)
LYMPHOCYTES # BLD AUTO: 1.4 K/UL (ref 1–4.8)
LYMPHOCYTES NFR BLD: 18.4 % (ref 18–48)
MAGNESIUM SERPL-MCNC: 1.4 MG/DL (ref 1.6–2.6)
MCH RBC QN AUTO: 32.3 PG (ref 27–31)
MCHC RBC AUTO-ENTMCNC: 32 G/DL (ref 32–36)
MCV RBC AUTO: 101 FL (ref 82–98)
MONOCYTES # BLD AUTO: 0.7 K/UL (ref 0.3–1)
MONOCYTES NFR BLD: 8.7 % (ref 4–15)
NEUTROPHILS # BLD AUTO: 5.3 K/UL (ref 1.8–7.7)
NEUTROPHILS NFR BLD: 67.2 % (ref 38–73)
NRBC BLD-RTO: 0 /100 WBC
PHOSPHATE SERPL-MCNC: 4.3 MG/DL (ref 2.7–4.5)
PLATELET # BLD AUTO: 310 K/UL (ref 150–350)
PMV BLD AUTO: 9.9 FL (ref 9.2–12.9)
POCT GLUCOSE: 140 MG/DL (ref 70–110)
POCT GLUCOSE: 157 MG/DL (ref 70–110)
POCT GLUCOSE: 186 MG/DL (ref 70–110)
POTASSIUM SERPL-SCNC: 3.2 MMOL/L (ref 3.5–5.1)
PROT SERPL-MCNC: 6.7 G/DL (ref 6–8.4)
PROTHROMBIN TIME: 11.6 SEC (ref 9–12.5)
RBC # BLD AUTO: 3.41 M/UL (ref 4.6–6.2)
SODIUM SERPL-SCNC: 137 MMOL/L (ref 136–145)
WBC # BLD AUTO: 7.83 K/UL (ref 3.9–12.7)

## 2019-03-28 PROCEDURE — 83735 ASSAY OF MAGNESIUM: CPT

## 2019-03-28 PROCEDURE — 25000003 PHARM REV CODE 250: Performed by: PSYCHIATRY & NEUROLOGY

## 2019-03-28 PROCEDURE — 25000003 PHARM REV CODE 250: Performed by: INTERNAL MEDICINE

## 2019-03-28 PROCEDURE — 25000003 PHARM REV CODE 250: Performed by: STUDENT IN AN ORGANIZED HEALTH CARE EDUCATION/TRAINING PROGRAM

## 2019-03-28 PROCEDURE — 85025 COMPLETE CBC W/AUTO DIFF WBC: CPT

## 2019-03-28 PROCEDURE — 20600001 HC STEP DOWN PRIVATE ROOM

## 2019-03-28 PROCEDURE — 94761 N-INVAS EAR/PLS OXIMETRY MLT: CPT

## 2019-03-28 PROCEDURE — 84100 ASSAY OF PHOSPHORUS: CPT

## 2019-03-28 PROCEDURE — 27000646 HC AEROBIKA DEVICE

## 2019-03-28 PROCEDURE — 36415 COLL VENOUS BLD VENIPUNCTURE: CPT

## 2019-03-28 PROCEDURE — 99232 SBSQ HOSP IP/OBS MODERATE 35: CPT | Mod: ,,, | Performed by: INTERNAL MEDICINE

## 2019-03-28 PROCEDURE — 94664 DEMO&/EVAL PT USE INHALER: CPT

## 2019-03-28 PROCEDURE — 80053 COMPREHEN METABOLIC PANEL: CPT

## 2019-03-28 PROCEDURE — 63600175 PHARM REV CODE 636 W HCPCS: Performed by: INTERNAL MEDICINE

## 2019-03-28 PROCEDURE — 99900035 HC TECH TIME PER 15 MIN (STAT)

## 2019-03-28 PROCEDURE — 99232 PR SUBSEQUENT HOSPITAL CARE,LEVL II: ICD-10-PCS | Mod: ,,, | Performed by: INTERNAL MEDICINE

## 2019-03-28 PROCEDURE — 25000003 PHARM REV CODE 250: Performed by: NURSE PRACTITIONER

## 2019-03-28 PROCEDURE — 25000003 PHARM REV CODE 250: Performed by: PHYSICIAN ASSISTANT

## 2019-03-28 PROCEDURE — 85610 PROTHROMBIN TIME: CPT

## 2019-03-28 RX ORDER — PANTOPRAZOLE SODIUM 40 MG/1
40 TABLET, DELAYED RELEASE ORAL DAILY
Status: DISCONTINUED | OUTPATIENT
Start: 2019-03-29 | End: 2019-03-29 | Stop reason: HOSPADM

## 2019-03-28 RX ADMIN — Medication 100 MG: at 08:03

## 2019-03-28 RX ADMIN — METOPROLOL TARTRATE 12.5 MG: 25 TABLET, FILM COATED ORAL at 08:03

## 2019-03-28 RX ADMIN — METOPROLOL TARTRATE 12.5 MG: 25 TABLET, FILM COATED ORAL at 09:03

## 2019-03-28 RX ADMIN — LACTULOSE 20 G: 20 SOLUTION ORAL at 03:03

## 2019-03-28 RX ADMIN — ALUMINUM HYDROXIDE, MAGNESIUM HYDROXIDE, AND SIMETHICONE 50 ML: 200; 200; 20 SUSPENSION ORAL at 04:03

## 2019-03-28 RX ADMIN — POTASSIUM BICARBONATE 50 MEQ: 25 TABLET, EFFERVESCENT ORAL at 05:03

## 2019-03-28 RX ADMIN — LACTULOSE 20 G: 20 SOLUTION ORAL at 08:03

## 2019-03-28 RX ADMIN — MAGNESIUM SULFATE HEPTAHYDRATE 3 G: 500 INJECTION, SOLUTION INTRAMUSCULAR; INTRAVENOUS at 04:03

## 2019-03-28 RX ADMIN — APIXABAN 5 MG: 5 TABLET, FILM COATED ORAL at 08:03

## 2019-03-28 RX ADMIN — APIXABAN 5 MG: 5 TABLET, FILM COATED ORAL at 09:03

## 2019-03-28 RX ADMIN — FOLIC ACID 1 MG: 1 TABLET ORAL at 08:03

## 2019-03-28 RX ADMIN — RANITIDINE 75 MG: 15 SYRUP ORAL at 08:03

## 2019-03-28 RX ADMIN — MICONAZOLE NITRATE: 20 POWDER TOPICAL at 08:03

## 2019-03-28 RX ADMIN — CYANOCOBALAMIN TAB 1000 MCG 1000 MCG: 1000 TAB at 08:03

## 2019-03-28 RX ADMIN — THERA TABS 1 TABLET: TAB at 08:03

## 2019-03-28 RX ADMIN — MICONAZOLE NITRATE: 20 POWDER TOPICAL at 09:03

## 2019-03-28 RX ADMIN — MIRTAZAPINE 7.5 MG: 7.5 TABLET ORAL at 09:03

## 2019-03-28 NOTE — PT/OT/SLP PROGRESS
Physical Therapy      Patient Name:  Donald Warren Abadie   MRN:  [id number]    Patient not seen today secondary to (pt. unwilling to participate despite multiple attempts due to c/o lack of sleep and heartburn). Will follow-up tomorrow.    Quique Padilla, PT   3/28/2019

## 2019-03-28 NOTE — PROGRESS NOTES
[facility] Hospital Medicine  Progress Note    Patient Name: Donald Warren Abadie  MRN: [id number]  Patient Class: IP- Inpatient   Admission Date: 3/15/2019  Length of Stay: 13 days  Attending Physician: DEAN Vigil MD  Primary Care Provider: Bacilio Larry MD    [facility] Medicine [place]: [facility] [place] [place] Vigil MD    Subjective:     Principal Problem:Acute metabolic encephalopathy    HPI:  Mr Abadie is a 64 y/o male with PMH of left sided RCC s/p partial nephrectomy (2014), paroxysmal AFib, EtOH abuse (PSYCH offered rehabilitation but declined), HTN, HLD, and fatty liver who [place] [facility] [place] with fatigue/malaise. There was concern that he had taken an accidental overdose of BB and flecainide because he felt that he was having tachycardia and reported taking additional doses of his prescribed metoprolol several times in the preceding days. He had also being drinking and his daughter reports hallucinations before ED admission. In ED, the EKG showed severe bradycardia. Reportedly, the patient displayed symptoms of EtOH withdrawal including anxiety, diaphoresis, agitation and hallucinations. There was concerned about a seizure prior to the patient going into into cardiac arrest.  A TVP was placed for HR in the 40s, post-ROSC. Etiology of arrest unclear but felt to be likely severe bradycardia given BB overdose.The patient remained intubated until 3/21 and was on the Cardiology service until 3/22 when MICU was consulted to assume care. During this week of admission, he was treated for alcohol withdrawals and on propofol and Versed during that time and also given a librium taper. He intermittently experience Afib/flutter and has been on a heparin infusion. Additionally, he had intermittent fevers, unclear source but was given intermittent Cefepime, Vancomycin until this was also discontinued with cultures negative. Since extubation, the patient has experienced altered mental status  "of unclear etiology. He never had any focal deficits, and is oriented to place and person. The MICU assumed care on 3/22.   Hospital/ICU Course:  3/23- Continue precedex, wean as tolerated. MRI shows no acute change. Bedside swallow, dc amaya, central line and abx.   3/24: Precedex stopped, Seroquel started. Hypotensive and Tachycardic, NS bolus with improvement  Metoprolol, diltiazem restarted for Afib, intermittent RVR and he has tolerated well. Mental status slowly improving.       Interval History: Mr. Abadie complains of worsening indigestion today.  He did sleep well and reports no feelings of agitation or anxiety.  He denies fever or chills.  Much more lucid and "clear headed" today.  He has a good appetite and reports being in good spirits.    Review of Systems   Constitutional: Positive for fatigue. Negative for chills and fever.   HENT: Negative for congestion.    Respiratory: Negative for cough and shortness of breath.    Cardiovascular: Negative for chest pain.   Gastrointestinal: Negative for abdominal pain, constipation, diarrhea, nausea and vomiting.        +Indigestion   Neurological: Negative for dizziness and weakness.   Psychiatric/Behavioral: Positive for confusion. Negative for agitation, behavioral problems, decreased concentration and sleep disturbance. The patient is not nervous/anxious.      Objective:     Vital Signs (Most Recent):  Temp: 97.8 °F (36.6 °C) (03/28/19 1208)  Pulse: 86 (03/28/19 1526)  Resp: 16 (03/28/19 1247)  BP: (!) 95/52 (03/28/19 1247)  SpO2: 96 % (03/28/19 1247) Vital Signs (24h Range):  Temp:  [97.8 °F (36.6 °C)-99.2 °F (37.3 °C)] 97.8 °F (36.6 °C)  Pulse:  [53-86] 86  Resp:  [16-18] 16  SpO2:  [96 %-99 %] 96 %  BP: ()/(51-58) 95/52     Weight: 79.5 kg (175 lb 4.3 oz)  Body mass index is 28.31 kg/m².    Intake/Output Summary (Last 24 hours) at 3/28/2019 1529  Last data filed at 3/27/2019 1800  Gross per 24 hour   Intake 620 ml   Output --   Net 620 ml    "   Physical Exam   Constitutional: He appears well-developed and well-nourished. He appears listless.  Non-toxic appearance. He has a sickly appearance. He does not appear ill. No distress.   HENT:   Head: Normocephalic and atraumatic.   Nose: Nose normal.   Mouth/Throat: Oropharynx is clear and moist. Abnormal dentition. No oropharyngeal exudate.   Eyes: Pupils are equal, round, and reactive to light. EOM are normal. Right eye exhibits no discharge. Left eye exhibits no discharge. Right conjunctiva is injected. Left conjunctiva is injected. No scleral icterus.   Neck: Normal range of motion. Neck supple. No JVD present. No tracheal deviation present. No thyromegaly present.   Cardiovascular: Normal rate, regular rhythm, normal heart sounds and intact distal pulses. Exam reveals no gallop and no friction rub.   No murmur heard.  Pulmonary/Chest: Effort normal. No accessory muscle usage or stridor. No tachypnea and no bradypnea. No respiratory distress. He has no decreased breath sounds. He has no wheezes. He has no rhonchi. He has no rales. He exhibits no tenderness.   Abdominal: Soft. Bowel sounds are normal. He exhibits no distension and no mass. There is no tenderness. There is no rebound and no guarding.   Truncal obesity   Genitourinary:   Genitourinary Comments: No amaya in place   Musculoskeletal: Normal range of motion. He exhibits no edema or tenderness.   Lymphadenopathy:     He has no cervical adenopathy.   No peripheral edema   Neurological: He appears listless. He is disoriented. He displays normal reflexes. No cranial nerve deficit. He exhibits normal muscle tone. Coordination normal. GCS eye subscore is 4. GCS verbal subscore is 5. GCS motor subscore is 6.   Alert and oriented 2/3, person/place only   Skin: Skin is warm and dry. No rash noted. No erythema. No pallor.   Less flushed and red.   Psychiatric: Judgment normal. His speech is delayed. He is slowed. Cognition and memory are impaired. He  exhibits a depressed mood. He exhibits abnormal recent memory and abnormal remote memory.   Mr. Abadie is much more alert, conversant, and lucid.  He is making logical speech with linear thoughts.   Nursing note and vitals reviewed.    ALL LABS, RADIOGRAPHS AND EKG'S PERSONALLY REVIEWED:  Significant Labs:   Recent Results (from the past 24 hour(s))   Comprehensive metabolic panel    Collection Time: 03/28/19  5:37 AM   Result Value Ref Range    Sodium 137 136 - 145 mmol/L    Potassium 3.2 (L) 3.5 - 5.1 mmol/L    Chloride 103 95 - 110 mmol/L    CO2 22 (L) 23 - 29 mmol/L    Glucose 148 (H) 70 - 110 mg/dL    BUN, Bld 7 (L) 8 - 23 mg/dL    Creatinine 0.8 0.5 - 1.4 mg/dL    Calcium 9.5 8.7 - 10.5 mg/dL    Total Protein 6.7 6.0 - 8.4 g/dL    Albumin 3.0 (L) 3.5 - 5.2 g/dL    Total Bilirubin 0.6 0.1 - 1.0 mg/dL    Alkaline Phosphatase 74 55 - 135 U/L    AST 99 (H) 10 - 40 U/L     (H) 10 - 44 U/L    Anion Gap 12 8 - 16 mmol/L    eGFR if African American >60.0 >60 mL/min/1.73 m^2    eGFR if non African American >60.0 >60 mL/min/1.73 m^2   Magnesium    Collection Time: 03/28/19  5:37 AM   Result Value Ref Range    Magnesium 1.4 (L) 1.6 - 2.6 mg/dL   Protime-INR    Collection Time: 03/28/19  5:37 AM   Result Value Ref Range    Prothrombin Time 11.6 9.0 - 12.5 sec    INR 1.1 0.8 - 1.2   CBC auto differential    Collection Time: 03/28/19  5:37 AM   Result Value Ref Range    WBC 7.83 3.90 - 12.70 K/uL    RBC 3.41 (L) 4.60 - 6.20 M/uL    Hemoglobin 11.0 (L) 14.0 - 18.0 g/dL    Hematocrit 34.4 (L) 40.0 - 54.0 %     (H) 82 - 98 fL    MCH 32.3 (H) 27.0 - 31.0 pg    MCHC 32.0 32.0 - 36.0 g/dL    RDW 13.0 11.5 - 14.5 %    Platelets 310 150 - 350 K/uL    MPV 9.9 9.2 - 12.9 fL    Immature Granulocytes 1.7 (H) 0.0 - 0.5 %    Gran # (ANC) 5.3 1.8 - 7.7 K/uL    Immature Grans (Abs) 0.13 (H) 0.00 - 0.04 K/uL    Lymph # 1.4 1.0 - 4.8 K/uL    Mono # 0.7 0.3 - 1.0 K/uL    Eos # 0.2 0.0 - 0.5 K/uL    Baso # 0.13 0.00 - 0.20 K/uL     nRBC 0 0 /100 WBC    Gran% 67.2 38.0 - 73.0 %    Lymph% 18.4 18.0 - 48.0 %    Mono% 8.7 4.0 - 15.0 %    Eosinophil% 2.3 0.0 - 8.0 %    Basophil% 1.7 0.0 - 1.9 %    Differential Method Automated    Phosphorus    Collection Time: 03/28/19  5:37 AM   Result Value Ref Range    Phosphorus 4.3 2.7 - 4.5 mg/dL   POCT glucose    Collection Time: 03/28/19  7:49 AM   Result Value Ref Range    POCT Glucose 157 (H) 70 - 110 mg/dL   POCT glucose    Collection Time: 03/28/19 11:44 AM   Result Value Ref Range    POCT Glucose 186 (H) 70 - 110 mg/dL     MELD-Na score: 7 at 3/28/2019  5:37 AM  MELD score: 7 at 3/28/2019  5:37 AM  Calculated from:  Serum Creatinine: 0.8 mg/dL (Rounded to 1 mg/dL) at 3/28/2019  5:37 AM  Serum Sodium: 137 mmol/L at 3/28/2019  5:37 AM  Total Bilirubin: 0.6 mg/dL (Rounded to 1 mg/dL) at 3/28/2019  5:37 AM  INR(ratio): 1.1 at 3/28/2019  5:37 AM  Age: 63 years     Significant Imaging:     None in the last 24 hours      Assessment/Plan:      Acute metabolic encephalopathy  -Likely multifactorial from ETOH abuse and withdrawal, delirium, arrest, intubation and sedation  -EEG on admission negative  -Delirium precautions  -CT on admission and on 3/22 shows no acute process  -MRI shows no acute abnormality and small remote left occipital lobe infarct  -thiamine tablet 100 mg, 100 mg, Oral, Daily  -QUEtiapine tablet 25 mg, 25 mg, Oral, QHS   -Daughter feels this is over sedating, so stopping  -Started Haldol 2mg po QHS on 3/26/19 with good results  -Required 5mg of IV Haldol night  Of 3/25/19 due to severe agitation  -Awaiting final Psychiatry consult recommendations from today as well    Bradycardia  -Likely from Diltiazem and Lopressor combo   -Reduced Lopressor 25mg--.>12.5mg PO BID on 3/27   -Discontinue the Diltiazem on 3/27d  -Mostly in 60's, but does drop to 40's at times  -Checked EKG: Just sinus bradycardia    ETOH abuse and addiction  Sleep disturbance  -Psychiatry consult noted and  appreciated  -Started Remeron 7.5mg po Q HS on 3/27    Alcohol withdrawal, resolved  -Originally on Precedex infusion, then completed benzo taper    Hypokalemia  -potassium bicarbonate disintegrating tablet 50 mEq, 50 mEq, Oral, Once  -Keep K>4, Mg>2    Hypomagnesemia  -magnesium sulfate 3g in water 50mL IVPB (premix), Intravenous, Once    Cardiopulmonary arrest  -Unsure of etiology for sure  -Thinking was overdose of home beta blocker induced severe bradycardia, then arrest   -Glucagon was given in ED       Paroxysmal atrial fibrillation  -Holding home flecainide  -Started running bradycardic on 3/27/19 to 40's:  -diltiaZEM 24 hr capsule 120 mg, 120 mg, Oral, Daily was discontinued after am dose on 3/27/19  -metoprolol tartrate (LOPRESSOR) tablet 25 mg Oral, BID reduced to 12.5mg on 3/27/19  -Anticoagulation as per below    Long term use of anticoagulation  -apixaban tablet 5 mg, 5 mg, Oral, BID      Diabetes mellitus without complication  -insulin aspart U-100 pen 0-5 Units, 0-5 Units, Subcutaneous, QID (AC + HS) PRN     Elevated LFTs  -lactulose 20 gram/30 mL solution Soln 20 g, 20 g, Oral, TID  -INR normal with low MELD: no high suspicion for severe cirrhosis    Macrocytic anemia  -Likely due to ETOH abuse  -Add MVI, Folic Acid 1mg po qday  -B12 1000mcg po QDAY        VTE Risk Mitigation (From admission, onward)        Ordered     apixaban tablet 5 mg  2 times daily      03/25/19 1058     IP VTE HIGH RISK PATIENT  Once      03/15/19 0847             WASHINGTON Vigil MD  Department of Hospital Medicine   [facility]
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
## 2019-03-28 NOTE — ASSESSMENT & PLAN NOTE
· Patient has been treated for acute alcohol withdrawal, not presently symptomatic  · Recommend continued treatment with thiamine 100mg po qdaily, would offer IV if patient has IV access; has received 5 days IV tx during stay  · Patient motivated for rehab, AA, no prior treatment in these settings; recommend dispo to residential rehab if possible   · Resource sheet for rehab and ABU provided to patient and family

## 2019-03-28 NOTE — ASSESSMENT & PLAN NOTE
Donald Warren Abadie is a 63 y.o. male with a past psychiatric history of alcohol use, currently presenting with Acute metabolic encephalopathy.  Psychiatry was originally consulted to address the patient's symptoms of delirium, though patient alert, oriented, and well able to attend on interview 3/26 evening and 3/27 day. CAM-ICU negative, and oriented x 4. Primary team came to see patient during psych interview, and agreed that patient's mentation was significantly improved compared to initial presentation. Reasonable to consider PRN treatment rather than scheduled, and given concerns of sedation with Seroquel reasonable to offer haldol as needed, though would recommend judicious use with careful monitoring as patient has history of QTc prolongation on prior EKGs.     On interview today, patient is oriented x 4 with negative CAM-ICU. Thought process is linear and organized. Delirium resolving.    IMPRESSION  Alcoholic Encephalopathy (resolving)    RECOMMENDATION(S)      1. Scheduled Medication(s):  None    2. PRN Medication(s):  Haldol 2mg PO/IM Q6H prn non-redirectable agitation      3.  Monitor:  Please obtain daily EKG to monitor QTc, would stop Haldol PRN if qtc>500.     4. Legal Status/Precaution(s):  Patient well able to understand his current medical situation, not a danger to himself, others, or gravely disabled on the basis of a psychiatric disorder presently.     5. Other:  CAM ICU - Negative  DELIRIUM BEHAVIOR MANAGEMENT  PLEASE utilize CHEMICAL restraints with PRN meds first for agitation. Minimize use of PHYSICAL restraints  Keep window shades open and room lit during day and room dim at night in order to promote normal sleep-wake cycles  Encourage family at bedside. [place] patient often to situation, location, date.  Continue to Limit or Discontinue use of Narcotics, Benzos and Anti-cholinergic medications as they may worsen delirium.  Continue medical workup for causative etiology of Delirium.

## 2019-03-28 NOTE — MEDICAL/APP STUDENT
"Medical Student Progress Note  Consult-Liaison Psychiatry    Admit Date: 3/15/2019   LOS: 13 days     LEGAL STATUS: IP    SUBJECTIVE:     Interim Events:   Patient has been medication compliant. No psychiatric PRNs in the past 24 hours. Calm and cooperative in interview. Alert and oriented to person, place and time. CAM-ICU negative.   He is feeling "good" today. He received Merazapine last night. States that he slept very well, better than other nights. He woke up once in the night due to heartburn which was then treated. Denies anxiety or sadness today.     PMHx  Past Medical History Reviewed    REVIEW OF SYSTEMS  Not reviewed       Scheduled Meds:   apixaban  5 mg Oral BID    cyanocobalamin  1,000 mcg Oral Daily    folic acid  1 mg Oral Daily    lactulose  20 g Oral TID    metoprolol tartrate  12.5 mg Oral BID    miconazole NITRATE 2 %   Topical (Top) BID    mirtazapine  7.5 mg Oral QHS    multivitamin  1 tablet Oral Daily    ranitidine  75 mg Oral BID    thiamine  100 mg Oral Daily     PRN Meds:acetaminophen, albuterol-ipratropium, dextrose 50%, dextrose 50%, glucagon (human recombinant), glucose, glucose, insulin aspart U-100      Review of patient's allergies indicates:   Allergen Reactions    No known drug allergies              OBJECTIVE:     Vital Signs (Most Recent)  Temp: 98.3 °F (36.8 °C) (03/28/19 0805)  Pulse: 65 (03/28/19 0805)  Resp: 16 (03/28/19 0805)  BP: (!) 116/57 (03/28/19 0805)  Weight: 79.5 kg (175 lb 4.3 oz) (03/27/19 1100)  BMI (Calculated): 28.4 (03/27/19 1100)      Mental Status Exam:  Appearance: unremarkable, age appropriate, well nourished  Behavior/Cooperation: friendly and cooperative  Speech: normal tone, normal rate, normal pitch, normal volume  Mood: good   Affect: normal and mood-incongruent  Thought Process: normal and logical  Thought Content: normal, no suicidality, no homicidality, delusions, or paranoia   Orientation: person, place, situation, time/date  Memory: " "Grossly intact  Attention Span/Concentration: Normal  Insight: good  Judgment: good    CAM-ICU: Negative  1. Acute change and/or fluctuating course of mental status: Yes  2. Inattention (SAVEAHAART): No  · "Squeeze my hand, only when you hear, the letter 'A'."  3. Altered Level of Consciousness: No  4. Disorganized Thinking (Errors >1/6): No  · "Will a stone float on water?"  · "Are there fish in the sea?"  · "Does one pound weigh more than two?"  · "Can you use a hammer to pound a nail?"  · Command(s):  · "Hold up 2 fingers."  · "Now do the same thing with the other hand."        Labs/Imaging  Recent Results (from the past 48 hour(s))   POCT glucose    Collection Time: 03/26/19 11:10 AM   Result Value Ref Range    POCT Glucose 155 (H) 70 - 110 mg/dL   POCT glucose    Collection Time: 03/26/19  5:10 PM   Result Value Ref Range    POCT Glucose 156 (H) 70 - 110 mg/dL   POCT glucose    Collection Time: 03/26/19 11:09 PM   Result Value Ref Range    POCT Glucose 141 (H) 70 - 110 mg/dL   Comprehensive metabolic panel    Collection Time: 03/27/19  5:35 AM   Result Value Ref Range    Sodium 138 136 - 145 mmol/L    Potassium 3.7 3.5 - 5.1 mmol/L    Chloride 102 95 - 110 mmol/L    CO2 23 23 - 29 mmol/L    Glucose 136 (H) 70 - 110 mg/dL    BUN, Bld 8 8 - 23 mg/dL    Creatinine 1.0 0.5 - 1.4 mg/dL    Calcium 10.1 8.7 - 10.5 mg/dL    Total Protein 7.0 6.0 - 8.4 g/dL    Albumin 3.2 (L) 3.5 - 5.2 g/dL    Total Bilirubin 0.9 0.1 - 1.0 mg/dL    Alkaline Phosphatase 75 55 - 135 U/L     (H) 10 - 40 U/L    ALT 97 (H) 10 - 44 U/L    Anion Gap 13 8 - 16 mmol/L    eGFR if African American >60.0 >60 mL/min/1.73 m^2    eGFR if non African American >60.0 >60 mL/min/1.73 m^2   Magnesium    Collection Time: 03/27/19  5:35 AM   Result Value Ref Range    Magnesium 1.5 (L) 1.6 - 2.6 mg/dL   Protime-INR    Collection Time: 03/27/19  5:35 AM   Result Value Ref Range    Prothrombin Time 11.4 9.0 - 12.5 sec    INR 1.1 0.8 - 1.2   CBC auto " differential    Collection Time: 03/27/19  5:35 AM   Result Value Ref Range    WBC 9.37 3.90 - 12.70 K/uL    RBC 3.34 (L) 4.60 - 6.20 M/uL    Hemoglobin 10.7 (L) 14.0 - 18.0 g/dL    Hematocrit 33.7 (L) 40.0 - 54.0 %     (H) 82 - 98 fL    MCH 32.0 (H) 27.0 - 31.0 pg    MCHC 31.8 (L) 32.0 - 36.0 g/dL    RDW 13.0 11.5 - 14.5 %    Platelets 300 150 - 350 K/uL    MPV 10.4 9.2 - 12.9 fL    Immature Granulocytes 1.3 (H) 0.0 - 0.5 %    Gran # (ANC) 6.5 1.8 - 7.7 K/uL    Immature Grans (Abs) 0.12 (H) 0.00 - 0.04 K/uL    Lymph # 1.6 1.0 - 4.8 K/uL    Mono # 0.9 0.3 - 1.0 K/uL    Eos # 0.1 0.0 - 0.5 K/uL    Baso # 0.13 0.00 - 0.20 K/uL    nRBC 0 0 /100 WBC    Gran% 68.8 38.0 - 73.0 %    Lymph% 17.5 (L) 18.0 - 48.0 %    Mono% 9.6 4.0 - 15.0 %    Eosinophil% 1.4 0.0 - 8.0 %    Basophil% 1.4 0.0 - 1.9 %    Differential Method Automated    Phosphorus    Collection Time: 03/27/19  5:35 AM   Result Value Ref Range    Phosphorus 3.8 2.7 - 4.5 mg/dL   POCT glucose    Collection Time: 03/27/19  8:11 AM   Result Value Ref Range    POCT Glucose 153 (H) 70 - 110 mg/dL   POCT glucose    Collection Time: 03/27/19 12:29 PM   Result Value Ref Range    POCT Glucose 143 (H) 70 - 110 mg/dL   Comprehensive metabolic panel    Collection Time: 03/28/19  5:37 AM   Result Value Ref Range    Sodium 137 136 - 145 mmol/L    Potassium 3.2 (L) 3.5 - 5.1 mmol/L    Chloride 103 95 - 110 mmol/L    CO2 22 (L) 23 - 29 mmol/L    Glucose 148 (H) 70 - 110 mg/dL    BUN, Bld 7 (L) 8 - 23 mg/dL    Creatinine 0.8 0.5 - 1.4 mg/dL    Calcium 9.5 8.7 - 10.5 mg/dL    Total Protein 6.7 6.0 - 8.4 g/dL    Albumin 3.0 (L) 3.5 - 5.2 g/dL    Total Bilirubin 0.6 0.1 - 1.0 mg/dL    Alkaline Phosphatase 74 55 - 135 U/L    AST 99 (H) 10 - 40 U/L     (H) 10 - 44 U/L    Anion Gap 12 8 - 16 mmol/L    eGFR if African American >60.0 >60 mL/min/1.73 m^2    eGFR if non African American >60.0 >60 mL/min/1.73 m^2   Magnesium    Collection Time: 03/28/19  5:37 AM   Result  Value Ref Range    Magnesium 1.4 (L) 1.6 - 2.6 mg/dL   Protime-INR    Collection Time: 03/28/19  5:37 AM   Result Value Ref Range    Prothrombin Time 11.6 9.0 - 12.5 sec    INR 1.1 0.8 - 1.2   CBC auto differential    Collection Time: 03/28/19  5:37 AM   Result Value Ref Range    WBC 7.83 3.90 - 12.70 K/uL    RBC 3.41 (L) 4.60 - 6.20 M/uL    Hemoglobin 11.0 (L) 14.0 - 18.0 g/dL    Hematocrit 34.4 (L) 40.0 - 54.0 %     (H) 82 - 98 fL    MCH 32.3 (H) 27.0 - 31.0 pg    MCHC 32.0 32.0 - 36.0 g/dL    RDW 13.0 11.5 - 14.5 %    Platelets 310 150 - 350 K/uL    MPV 9.9 9.2 - 12.9 fL    Immature Granulocytes 1.7 (H) 0.0 - 0.5 %    Gran # (ANC) 5.3 1.8 - 7.7 K/uL    Immature Grans (Abs) 0.13 (H) 0.00 - 0.04 K/uL    Lymph # 1.4 1.0 - 4.8 K/uL    Mono # 0.7 0.3 - 1.0 K/uL    Eos # 0.2 0.0 - 0.5 K/uL    Baso # 0.13 0.00 - 0.20 K/uL    nRBC 0 0 /100 WBC    Gran% 67.2 38.0 - 73.0 %    Lymph% 18.4 18.0 - 48.0 %    Mono% 8.7 4.0 - 15.0 %    Eosinophil% 2.3 0.0 - 8.0 %    Basophil% 1.7 0.0 - 1.9 %    Differential Method Automated    Phosphorus    Collection Time: 03/28/19  5:37 AM   Result Value Ref Range    Phosphorus 4.3 2.7 - 4.5 mg/dL   POCT glucose    Collection Time: 03/28/19  7:49 AM   Result Value Ref Range    POCT Glucose 157 (H) 70 - 110 mg/dL      No results found for: PHENYTOIN, PHENOBARB, VALPROATE, CBMZ  No results found for: LITHIUM        ASSESSMENT/PLAN:     Donald Warren Abadie is a 63 y.o. male with a past psychiatric history of alcohol use, who presented to the [facility] [place] due to acute metabolic encephalopathy. Psychiatry was originally consulted to address the patient's symptoms of delirium, though patient alert, oriented, and well able to attend on interview 3/26 evening and 3/27 day. CAM-ICU negative, and oriented x 4. Primary team came to see patient during psych interview, and agreed that patient's mentation was significantly improved compared to initial presentation. Reasonable to consider PRN treatment rather  than scheduled, and given concerns of sedation with Seroquel reasonable to offer haldol as needed, though would recommend judicious use with careful monitoring as patient has history of QTc prolongation on prior EKGs        IMPRESSION  Alcoholic Encephalopathy (resolving)        RECOMMENDATION(S)      1. Scheduled Medication(s):  Mirtazapine 7.5mg PO Nightly     2. PRN Medication(s):  None    3.  Monitor:  None    4. Legal Status/Precaution(s):  Patient does not meet criteria for PEC or inpatient psychiatric admission at this time.     5. Other:   CAM ICU- Negative  DELIRIUM BEHAVIOR MANAGEMENT   PLEASE utilize CHEMICAL restraints with PRN meds first for agitation. Minimize use of PHYSICAL restraints  Keep window shades open and room lit during day and room dim at night in order to promote normal sleep-wake cycles  Encourage family at bedside. [place] patient often to situation, location, date.  Continue to Limit or Discontinue use of Narcotics, Benzos and Anti-cholinergic medications as they may worsen delirium.  Continue medical workup for causative etiology of Delirium.    Need for continued hospitalization:  awaiting disposition plan    Target Disposition:  home         Cassandra O'Connor [facility] Student

## 2019-03-28 NOTE — SUBJECTIVE & OBJECTIVE
"Interval History: see hospital course    Family History     Problem Relation (Age of Onset)    Cancer Father    Colon polyps Brother    Diabetes Mother    Lung cancer Father, Sister        Tobacco Use    Smoking status: Never Smoker    Smokeless tobacco: Never Used   Substance and Sexual Activity    Alcohol use: Yes     Alcohol/week: 4.2 oz     Types: 7 Cans of beer per week     Comment: patient states he drinks less than he use to    Drug use: No    Sexual activity: Not on file     Psychotherapeutics (From admission, onward)    Start     Stop Route Frequency Ordered    03/27/19 2100  mirtazapine tablet 7.5 mg      -- Oral Nightly 03/27/19 1535           Review of Systems  Objective:     Vital Signs (Most Recent):  Temp: 98.3 °F (36.8 °C) (03/28/19 0805)  Pulse: 65 (03/28/19 0805)  Resp: 16 (03/28/19 0805)  BP: (!) 116/57 (03/28/19 0805)  SpO2: 97 % (03/28/19 0805) Vital Signs (24h Range):  Temp:  [98.1 °F (36.7 °C)-99.2 °F (37.3 °C)] 98.3 °F (36.8 °C)  Pulse:  [53-70] 65  Resp:  [16-18] 16  SpO2:  [97 %-99 %] 97 %  BP: ()/(52-59) 116/57     Height: 5' 5.98" (167.6 cm)  Weight: 79.5 kg (175 lb 4.3 oz)  Body mass index is 28.31 kg/m².      Intake/Output Summary (Last 24 hours) at 3/28/2019 0906  Last data filed at 3/27/2019 1800  Gross per 24 hour   Intake 620 ml   Output --   Net 620 ml       Physical Exam   Psychiatric:   Mental Status Exam:  Appearance: fair hygiene and grooming, lying in bed, dressed in hospital gown NAD, appears stated age   Behavior/Cooperation: calm, cooperative, pleasant, engaged  Language: fluent english  Speech: normal tone, normal rate, normal pitch, normal volume  Mood: "alright except I had heartburn"  Affect: full, reactive, appropriate  Thought Process: linear, logical, goal-directed  Thought Content:  denies SI/HI/paranoia/delusions; no objective evidence of paranoia or delusions  Perception: denies AVH; no objective evidence of AVH   Orientation: self, place, time, " situation  Memory: intact to conversation  Attention Span/Concentration: intact to conversation  Fund of Knowledge: appropriate for education level  Insight: good  Judgment: good          Significant Labs:   Recent Results (from the past 48 hour(s))   POCT glucose    Collection Time: 03/26/19 11:10 AM   Result Value Ref Range    POCT Glucose 155 (H) 70 - 110 mg/dL   POCT glucose    Collection Time: 03/26/19  5:10 PM   Result Value Ref Range    POCT Glucose 156 (H) 70 - 110 mg/dL   POCT glucose    Collection Time: 03/26/19 11:09 PM   Result Value Ref Range    POCT Glucose 141 (H) 70 - 110 mg/dL   Comprehensive metabolic panel    Collection Time: 03/27/19  5:35 AM   Result Value Ref Range    Sodium 138 136 - 145 mmol/L    Potassium 3.7 3.5 - 5.1 mmol/L    Chloride 102 95 - 110 mmol/L    CO2 23 23 - 29 mmol/L    Glucose 136 (H) 70 - 110 mg/dL    BUN, Bld 8 8 - 23 mg/dL    Creatinine 1.0 0.5 - 1.4 mg/dL    Calcium 10.1 8.7 - 10.5 mg/dL    Total Protein 7.0 6.0 - 8.4 g/dL    Albumin 3.2 (L) 3.5 - 5.2 g/dL    Total Bilirubin 0.9 0.1 - 1.0 mg/dL    Alkaline Phosphatase 75 55 - 135 U/L     (H) 10 - 40 U/L    ALT 97 (H) 10 - 44 U/L    Anion Gap 13 8 - 16 mmol/L    eGFR if African American >60.0 >60 mL/min/1.73 m^2    eGFR if non African American >60.0 >60 mL/min/1.73 m^2   Magnesium    Collection Time: 03/27/19  5:35 AM   Result Value Ref Range    Magnesium 1.5 (L) 1.6 - 2.6 mg/dL   Protime-INR    Collection Time: 03/27/19  5:35 AM   Result Value Ref Range    Prothrombin Time 11.4 9.0 - 12.5 sec    INR 1.1 0.8 - 1.2   CBC auto differential    Collection Time: 03/27/19  5:35 AM   Result Value Ref Range    WBC 9.37 3.90 - 12.70 K/uL    RBC 3.34 (L) 4.60 - 6.20 M/uL    Hemoglobin 10.7 (L) 14.0 - 18.0 g/dL    Hematocrit 33.7 (L) 40.0 - 54.0 %     (H) 82 - 98 fL    MCH 32.0 (H) 27.0 - 31.0 pg    MCHC 31.8 (L) 32.0 - 36.0 g/dL    RDW 13.0 11.5 - 14.5 %    Platelets 300 150 - 350 K/uL    MPV 10.4 9.2 - 12.9 fL     Immature Granulocytes 1.3 (H) 0.0 - 0.5 %    Gran # (ANC) 6.5 1.8 - 7.7 K/uL    Immature Grans (Abs) 0.12 (H) 0.00 - 0.04 K/uL    Lymph # 1.6 1.0 - 4.8 K/uL    Mono # 0.9 0.3 - 1.0 K/uL    Eos # 0.1 0.0 - 0.5 K/uL    Baso # 0.13 0.00 - 0.20 K/uL    nRBC 0 0 /100 WBC    Gran% 68.8 38.0 - 73.0 %    Lymph% 17.5 (L) 18.0 - 48.0 %    Mono% 9.6 4.0 - 15.0 %    Eosinophil% 1.4 0.0 - 8.0 %    Basophil% 1.4 0.0 - 1.9 %    Differential Method Automated    Phosphorus    Collection Time: 03/27/19  5:35 AM   Result Value Ref Range    Phosphorus 3.8 2.7 - 4.5 mg/dL   POCT glucose    Collection Time: 03/27/19  8:11 AM   Result Value Ref Range    POCT Glucose 153 (H) 70 - 110 mg/dL   POCT glucose    Collection Time: 03/27/19 12:29 PM   Result Value Ref Range    POCT Glucose 143 (H) 70 - 110 mg/dL   Comprehensive metabolic panel    Collection Time: 03/28/19  5:37 AM   Result Value Ref Range    Sodium 137 136 - 145 mmol/L    Potassium 3.2 (L) 3.5 - 5.1 mmol/L    Chloride 103 95 - 110 mmol/L    CO2 22 (L) 23 - 29 mmol/L    Glucose 148 (H) 70 - 110 mg/dL    BUN, Bld 7 (L) 8 - 23 mg/dL    Creatinine 0.8 0.5 - 1.4 mg/dL    Calcium 9.5 8.7 - 10.5 mg/dL    Total Protein 6.7 6.0 - 8.4 g/dL    Albumin 3.0 (L) 3.5 - 5.2 g/dL    Total Bilirubin 0.6 0.1 - 1.0 mg/dL    Alkaline Phosphatase 74 55 - 135 U/L    AST 99 (H) 10 - 40 U/L     (H) 10 - 44 U/L    Anion Gap 12 8 - 16 mmol/L    eGFR if African American >60.0 >60 mL/min/1.73 m^2    eGFR if non African American >60.0 >60 mL/min/1.73 m^2   Magnesium    Collection Time: 03/28/19  5:37 AM   Result Value Ref Range    Magnesium 1.4 (L) 1.6 - 2.6 mg/dL   Protime-INR    Collection Time: 03/28/19  5:37 AM   Result Value Ref Range    Prothrombin Time 11.6 9.0 - 12.5 sec    INR 1.1 0.8 - 1.2   CBC auto differential    Collection Time: 03/28/19  5:37 AM   Result Value Ref Range    WBC 7.83 3.90 - 12.70 K/uL    RBC 3.41 (L) 4.60 - 6.20 M/uL    Hemoglobin 11.0 (L) 14.0 - 18.0 g/dL    Hematocrit  34.4 (L) 40.0 - 54.0 %     (H) 82 - 98 fL    MCH 32.3 (H) 27.0 - 31.0 pg    MCHC 32.0 32.0 - 36.0 g/dL    RDW 13.0 11.5 - 14.5 %    Platelets 310 150 - 350 K/uL    MPV 9.9 9.2 - 12.9 fL    Immature Granulocytes 1.7 (H) 0.0 - 0.5 %    Gran # (ANC) 5.3 1.8 - 7.7 K/uL    Immature Grans (Abs) 0.13 (H) 0.00 - 0.04 K/uL    Lymph # 1.4 1.0 - 4.8 K/uL    Mono # 0.7 0.3 - 1.0 K/uL    Eos # 0.2 0.0 - 0.5 K/uL    Baso # 0.13 0.00 - 0.20 K/uL    nRBC 0 0 /100 WBC    Gran% 67.2 38.0 - 73.0 %    Lymph% 18.4 18.0 - 48.0 %    Mono% 8.7 4.0 - 15.0 %    Eosinophil% 2.3 0.0 - 8.0 %    Basophil% 1.7 0.0 - 1.9 %    Differential Method Automated    Phosphorus    Collection Time: 03/28/19  5:37 AM   Result Value Ref Range    Phosphorus 4.3 2.7 - 4.5 mg/dL   POCT glucose    Collection Time: 03/28/19  7:49 AM   Result Value Ref Range    POCT Glucose 157 (H) 70 - 110 mg/dL      No results found for: PHENYTOIN, PHENOBARB, VALPROATE, CBMZ      Significant Imaging: I have reviewed all pertinent imaging results/findings within the past 24 hours.

## 2019-03-28 NOTE — PT/OT/SLP PROGRESS
Occupational Therapy      Patient Name:  Donald Warren Abadie   MRN:  [id number]    Patient not seen today secondary to getting into shower with PCT assist upon arrival. Will follow-up per schedule.    JAZLYN Martin  3/28/2019

## 2019-03-28 NOTE — PLAN OF CARE
Problem: Adult Inpatient Plan of Care  Goal: Plan of Care Review  Outcome: Ongoing (interventions implemented as appropriate)     03/28/19 0577   Plan of Care Review   Plan of Care Reviewed With patient;daughter   No acute events overnight.  Pt disoriented to place and situation, but easily reoriented.  First dose of Mirtazapine given at the beginning of shift.  Pt resting comfortably and using call light appropriately.  Family to return in the morning.  Remaining free from injury/falls.  Safety maintained.  Bed alarm set.  Camera at bedside.  Will continue to monitor.

## 2019-03-29 VITALS
HEIGHT: 66 IN | TEMPERATURE: 98 F | OXYGEN SATURATION: 98 % | SYSTOLIC BLOOD PRESSURE: 135 MMHG | HEART RATE: 64 BPM | BODY MASS INDEX: 27.09 KG/M2 | RESPIRATION RATE: 18 BRPM | DIASTOLIC BLOOD PRESSURE: 65 MMHG | WEIGHT: 168.56 LBS

## 2019-03-29 LAB
ALBUMIN SERPL BCP-MCNC: 3.3 G/DL (ref 3.5–5.2)
ALP SERPL-CCNC: 86 U/L (ref 55–135)
ALT SERPL W/O P-5'-P-CCNC: 89 U/L (ref 10–44)
ANION GAP SERPL CALC-SCNC: 13 MMOL/L (ref 8–16)
AST SERPL-CCNC: 69 U/L (ref 10–40)
BASOPHILS # BLD AUTO: 0.17 K/UL (ref 0–0.2)
BASOPHILS NFR BLD: 2.3 % (ref 0–1.9)
BILIRUB SERPL-MCNC: 0.9 MG/DL (ref 0.1–1)
BUN SERPL-MCNC: 6 MG/DL (ref 8–23)
CALCIUM SERPL-MCNC: 10.2 MG/DL (ref 8.7–10.5)
CHLORIDE SERPL-SCNC: 102 MMOL/L (ref 95–110)
CO2 SERPL-SCNC: 20 MMOL/L (ref 23–29)
CREAT SERPL-MCNC: 0.9 MG/DL (ref 0.5–1.4)
DIFFERENTIAL METHOD: ABNORMAL
EOSINOPHIL # BLD AUTO: 0.2 K/UL (ref 0–0.5)
EOSINOPHIL NFR BLD: 2.7 % (ref 0–8)
ERYTHROCYTE [DISTWIDTH] IN BLOOD BY AUTOMATED COUNT: 12.9 % (ref 11.5–14.5)
EST. GFR  (AFRICAN AMERICAN): >60 ML/MIN/1.73 M^2
EST. GFR  (NON AFRICAN AMERICAN): >60 ML/MIN/1.73 M^2
GLUCOSE SERPL-MCNC: 133 MG/DL (ref 70–110)
HCT VFR BLD AUTO: 38 % (ref 40–54)
HGB BLD-MCNC: 12.3 G/DL (ref 14–18)
IMM GRANULOCYTES # BLD AUTO: 0.08 K/UL (ref 0–0.04)
IMM GRANULOCYTES NFR BLD AUTO: 1.1 % (ref 0–0.5)
INR PPP: 1.1 (ref 0.8–1.2)
LYMPHOCYTES # BLD AUTO: 1.6 K/UL (ref 1–4.8)
LYMPHOCYTES NFR BLD: 22.1 % (ref 18–48)
MAGNESIUM SERPL-MCNC: 1.9 MG/DL (ref 1.6–2.6)
MCH RBC QN AUTO: 32.3 PG (ref 27–31)
MCHC RBC AUTO-ENTMCNC: 32.4 G/DL (ref 32–36)
MCV RBC AUTO: 100 FL (ref 82–98)
MONOCYTES # BLD AUTO: 0.8 K/UL (ref 0.3–1)
MONOCYTES NFR BLD: 10.4 % (ref 4–15)
NEUTROPHILS # BLD AUTO: 4.5 K/UL (ref 1.8–7.7)
NEUTROPHILS NFR BLD: 61.4 % (ref 38–73)
NRBC BLD-RTO: 0 /100 WBC
PHOSPHATE SERPL-MCNC: 3.7 MG/DL (ref 2.7–4.5)
PLATELET # BLD AUTO: 309 K/UL (ref 150–350)
PMV BLD AUTO: 9.4 FL (ref 9.2–12.9)
POCT GLUCOSE: 145 MG/DL (ref 70–110)
POCT GLUCOSE: 174 MG/DL (ref 70–110)
POTASSIUM SERPL-SCNC: 3.9 MMOL/L (ref 3.5–5.1)
PROT SERPL-MCNC: 7.6 G/DL (ref 6–8.4)
PROTHROMBIN TIME: 11.5 SEC (ref 9–12.5)
RBC # BLD AUTO: 3.81 M/UL (ref 4.6–6.2)
SODIUM SERPL-SCNC: 135 MMOL/L (ref 136–145)
WBC # BLD AUTO: 7.34 K/UL (ref 3.9–12.7)

## 2019-03-29 PROCEDURE — 80053 COMPREHEN METABOLIC PANEL: CPT

## 2019-03-29 PROCEDURE — 36415 COLL VENOUS BLD VENIPUNCTURE: CPT

## 2019-03-29 PROCEDURE — 94664 DEMO&/EVAL PT USE INHALER: CPT

## 2019-03-29 PROCEDURE — 85025 COMPLETE CBC W/AUTO DIFF WBC: CPT

## 2019-03-29 PROCEDURE — 25000003 PHARM REV CODE 250: Performed by: NURSE PRACTITIONER

## 2019-03-29 PROCEDURE — 84100 ASSAY OF PHOSPHORUS: CPT

## 2019-03-29 PROCEDURE — 97530 THERAPEUTIC ACTIVITIES: CPT

## 2019-03-29 PROCEDURE — 99239 PR HOSPITAL DISCHARGE DAY,>30 MIN: ICD-10-PCS | Mod: ,,, | Performed by: INTERNAL MEDICINE

## 2019-03-29 PROCEDURE — 99900035 HC TECH TIME PER 15 MIN (STAT)

## 2019-03-29 PROCEDURE — 97116 GAIT TRAINING THERAPY: CPT

## 2019-03-29 PROCEDURE — 92507 TX SP LANG VOICE COMM INDIV: CPT

## 2019-03-29 PROCEDURE — 83735 ASSAY OF MAGNESIUM: CPT

## 2019-03-29 PROCEDURE — 99239 HOSP IP/OBS DSCHRG MGMT >30: CPT | Mod: ,,, | Performed by: INTERNAL MEDICINE

## 2019-03-29 PROCEDURE — 25000003 PHARM REV CODE 250: Performed by: INTERNAL MEDICINE

## 2019-03-29 PROCEDURE — 25000003 PHARM REV CODE 250: Performed by: STUDENT IN AN ORGANIZED HEALTH CARE EDUCATION/TRAINING PROGRAM

## 2019-03-29 PROCEDURE — 85610 PROTHROMBIN TIME: CPT

## 2019-03-29 PROCEDURE — 27000646 HC AEROBIKA DEVICE

## 2019-03-29 RX ORDER — PANTOPRAZOLE SODIUM 40 MG/1
40 TABLET, DELAYED RELEASE ORAL DAILY
Qty: 30 TABLET | Refills: 11 | Status: SHIPPED | OUTPATIENT
Start: 2019-03-30 | End: 2020-04-16 | Stop reason: SDUPTHER

## 2019-03-29 RX ORDER — FOLIC ACID 1 MG/1
1 TABLET ORAL DAILY
Qty: 30 TABLET | Refills: 11 | Status: SHIPPED | OUTPATIENT
Start: 2019-03-30 | End: 2024-03-13

## 2019-03-29 RX ORDER — LANOLIN ALCOHOL/MO/W.PET/CERES
100 CREAM (GRAM) TOPICAL DAILY
COMMUNITY
Start: 2019-03-30 | End: 2019-10-17

## 2019-03-29 RX ORDER — METOPROLOL TARTRATE 25 MG/1
12.5 TABLET, FILM COATED ORAL 2 TIMES DAILY
Qty: 30 TABLET | Refills: 11 | Status: SHIPPED | OUTPATIENT
Start: 2019-03-29 | End: 2019-08-02 | Stop reason: SDUPTHER

## 2019-03-29 RX ORDER — MIRTAZAPINE 7.5 MG/1
7.5 TABLET, FILM COATED ORAL NIGHTLY
Qty: 30 TABLET | Refills: 11 | Status: SHIPPED | OUTPATIENT
Start: 2019-03-29 | End: 2019-04-09

## 2019-03-29 RX ORDER — LANOLIN ALCOHOL/MO/W.PET/CERES
400 CREAM (GRAM) TOPICAL DAILY
Status: DISCONTINUED | OUTPATIENT
Start: 2019-03-29 | End: 2019-03-29 | Stop reason: HOSPADM

## 2019-03-29 RX ORDER — LANOLIN ALCOHOL/MO/W.PET/CERES
1000 CREAM (GRAM) TOPICAL DAILY
COMMUNITY
Start: 2019-03-30

## 2019-03-29 RX ADMIN — Medication 100 MG: at 08:03

## 2019-03-29 RX ADMIN — LACTULOSE 20 G: 20 SOLUTION ORAL at 08:03

## 2019-03-29 RX ADMIN — MICONAZOLE NITRATE: 20 POWDER TOPICAL at 08:03

## 2019-03-29 RX ADMIN — POTASSIUM BICARBONATE 25 MEQ: 25 TABLET, EFFERVESCENT ORAL at 08:03

## 2019-03-29 RX ADMIN — MAGNESIUM OXIDE TAB 400 MG (241.3 MG ELEMENTAL MG) 400 MG: 400 (241.3 MG) TAB at 08:03

## 2019-03-29 RX ADMIN — THERA TABS 1 TABLET: TAB at 08:03

## 2019-03-29 RX ADMIN — CYANOCOBALAMIN TAB 1000 MCG 1000 MCG: 1000 TAB at 08:03

## 2019-03-29 RX ADMIN — PANTOPRAZOLE SODIUM 40 MG: 40 TABLET, DELAYED RELEASE ORAL at 08:03

## 2019-03-29 RX ADMIN — FOLIC ACID 1 MG: 1 TABLET ORAL at 08:03

## 2019-03-29 RX ADMIN — APIXABAN 5 MG: 5 TABLET, FILM COATED ORAL at 08:03

## 2019-03-29 RX ADMIN — METOPROLOL TARTRATE 12.5 MG: 25 TABLET, FILM COATED ORAL at 08:03

## 2019-03-29 NOTE — PLAN OF CARE
[facility] accepted the patient.      03/29/19 1137   Post-Acute Status   Post-Acute Authorization Home Health/Hospice   Home Health/Hospice Status Authorization Obtained     Melissa Leslie LMSW  [facility]  [id number]

## 2019-03-29 NOTE — PLAN OF CARE
Problem: SLP Goal  Goal: SLP Goal  Speech Language Pathology Goals  Goals expected to be met by 3/28/19    1.  Pt will participate in ongoing swallow evaluation to determine readiness to resume po intake. -goal met  2.  Pt will complete Speech Language Cognitive evaluation to determine the need for additional goals. Goal met    New goals to be met 4/2  1/ pt will be ox4 using any modality  2 pt will complete mental manipulation task with 80% acc and min a  3 pt will participate in further eval of time/money management skills  4. Pt will write short sentences with 80% acc.       Outcome: Ongoing (interventions implemented as appropriate)  Pt participated well w/ tx tasks.  Pt discharge home is anticipated for today.  Pt would benefit from Home Health Skilled Speech services upon discharge for ongoing cognitive therapy.  Cont ST per POC.    Emma Suazo CCC-SLP  3/29/2019

## 2019-03-29 NOTE — PLAN OF CARE
DEENA assigned to case today 3/29/2019. DEENA will assist team with DC needs. SW in communication with CM.    SW spoke to the patient and patient's daughter. DEENA explained that the recommendation is for the patient to DC with home health. Patient's daughter asked SW to send referral to [facility] and if [facility] cannot accept the patient then they do not have a preference.     DEENA sent referral to [facility] via [employer].      03/29/19 1133   Post-Acute Status   Post-Acute Authorization Home Health/Hospice   Home Health/Hospice Status Referrals Sent     Melissa Leslie LMSW  [facility]  [phone]

## 2019-03-29 NOTE — PT/OT/SLP PROGRESS
Speech Language Pathology Treatment    Patient Name:  Donald Warren Abadie   MRN:  [id number]  Admitting Diagnosis: Acute metabolic encephalopathy    Recommendations:                 General Recommendations:  Cognitive-linguistic therapy  Diet recommendations:  Regular, Liquid Diet Level: Thin   Aspiration Precautions: Standard aspiration precautions   General Precautions: Standard, aspiration, fall  Communication strategies:  hearing aids    Subjective     Pt was awake and alert in NAD.  He was agreeable to tx session  Patient goals: none expressed     Pain/Comfort:  · Pain Rating 1: 0/10  · Pain Rating Post-Intervention 1: 0/10    Objective:     Has the patient been evaluated by SLP for swallowing?   Yes  Keep patient NPO? No   Current Respiratory Status: room air      Pt completed further evaluation of math, time and money management skills.  He completed tasks for money w/ 100% acc and 80% acc for time given min cues and repetitions.  Pt was able to recall daily activities and meals and was oriented x4 given min cues for time.  He was provided education re: benefit of ongoing cognitive therapy for attention and memory management following discharge.  He indicated good understanding and agreed w/ recommendations.    Assessment:     Donald Warren Abadie is a 63 y.o. male with an SLP diagnosis of Cognitive-Linguistic Impairment.  He presents with good progress towards goals.    Goals:   Multidisciplinary Problems     SLP Goals        Problem: SLP Goal    Goal Priority Disciplines Outcome   SLP Goal     SLP Ongoing (interventions implemented as appropriate)   Description:  Speech Language Pathology Goals  Goals expected to be met by 3/28/19    1.  Pt will participate in ongoing swallow evaluation to determine readiness to resume po intake. -goal met  2.  Pt will complete Speech Language Cognitive evaluation to determine the need for additional goals. Goal met    New goals to be met 4/2  1/ pt will be ox4 using any  modality  2 pt will complete mental manipulation task with 80% acc and min a  3 pt will participate in further eval of time/money management skills  4. Pt will write short sentences with 80% acc.                        Plan:     · Patient to be seen:  3 x/week   · Plan of Care expires:  04/21/19  · Plan of Care reviewed with:  patient, daughter   · SLP Follow-Up:  Yes       Discharge recommendations:  home with home health, home health speech therapy   Barriers to Discharge:  None    Time Tracking:     SLP Treatment Date:   03/29/19  Speech Start Time:  1020  Speech Stop Time:  1043     Speech Total Time (min):  23 min    Billable Minutes: Speech Therapy Individual 23    Emma Suazo CCC-SLP  03/29/2019

## 2019-03-29 NOTE — PT/OT/SLP PROGRESS
"Occupational Therapy   Treatment and Discharge    Name: Donald Warren Abadie  MRN: [id number]  Admitting Diagnosis:  Acute metabolic encephalopathy       Recommendations:     Discharge Recommendations: home health OT  Discharge Equipment Recommendations:  tub bench, shower chair, grab bar, toilet, grab bar, tub/shower  Barriers to discharge:       Assessment:     Donald Warren Abadie is a 63 y.o. male with a medical diagnosis of Acute metabolic encephalopathy.  Pt participated in bed mobility, toilet t/f, toileting with associated hand hygiene, and ambulation through two hallways in hospital with RW. Pt performed tasks with modified (I). At this time pt no longer requires OT during hospitalization. Pt is recommended to continue OT services with home health OT upon discharge.      Plan:     Patient to be seen 4 x/week to address the above listed problems via self-care/home management, therapeutic activities, therapeutic exercises  · Plan of Care Expires: 04/23/19  · Plan of Care Reviewed with: patient, daughter    Subjective     Pain/Comfort:  · Pain Rating 1: 0/10    Objective:     Communicated with: RN prior to session.  Patient found sitting on edge of bed  with peripheral IV, telemetry upon OT entry to room.    General Precautions: Standard, aspiration, fall   Orthopedic Precautions:N/A   Braces: N/A     Occupational Performance:     Bed Mobility:    · Patient completed Scooting/Bridging with modified independence     Functional Mobility/Transfers:  · Patient completed Sit <> Stand Transfer with modified independence  with  no assistive device   · Patient completed Toilet Transfer Step Transfer technique with independence with  no AD  · Functional Mobility: Pt completed bedroom, bathroom, and hallway ambulation during session with mod (I). Pt did not use RW during bedroom and bathroom ambulation however politely requested to use during hallway ambulation "for safety." Pt expressed he would like to use rollator upon " discharge so that he could take rest breaks prn using seat.     Activities of Daily Living:  · Grooming: mod (I) washing hands standing at sink with no safety concerns at this time  · Toileting: modified independence using standard toilet.      [facility] 6 Click ADL: 24    Treatment & Education:  Role of OT, POC, and safety during ADLs and fx'l mobility education with pt and daughter. Both verbalized understanding. Pt's daughter and OT discussed equipment options for safety upon pt's discharge to which pt, pt's daughter, and OT unanimously agreed upon grab bars, rollator, and bath bench for safety. Pt's daughter also requested shower chair to be ordered for her home for [place]. Pt completed ambulation in bedroom, bathroom, and hallway, as well as completed toileting session with associated hand hygiene. Pt demo mod (I) throughout session with good environmental scanning and walker management when used.    Patient left sitting on edge of bed  with all lines intact, call button in reach and daughter  presentEducation:      GOALS:   Multidisciplinary Problems     Occupational Therapy Goals     Not on file          Multidisciplinary Problems (Resolved)        Problem: Occupational Therapy Goal    Goal Priority Disciplines Outcome Interventions   Occupational Therapy Goal   (Resolved)     OT, PT/OT Outcome(s) achieved    Description:  Goals to be met by: 4/9/19  Patient will increase functional independence with ADLs by performing:    UE Dressing with Modified [place].  LE Dressing with Modified [place].  Grooming while standing with Modified [place].  Toileting from toilet with Modified [place] for hygiene and clothing management.   Step transfer with Modified [place]  Upper extremity exercise program x15 reps per handout, with independence.                       Time Tracking:     OT Date of Treatment: 03/29/19  OT Start Time: 1146  OT Stop Time:  1159  OT Total Time (min): 13 min    Billable Minutes:Therapeutic Activity 13 min    Cheli Aguirre OT  3/29/2019

## 2019-03-29 NOTE — PLAN OF CARE
Payor: BLUE [place] BLUE [facility] / Plan: [place] PPO / Product Type: PPO /      Bacilio Larry MD     Future Appointments   Date Time Provider Department Center   4/2/2019  1:00 PM Natalie Austin MD [facility] CARDIO [place]   4/9/2019 10:00 AM Bacilio Larry MD [facility] IM [place] PCW     Pt going home with daughter. DME ordered: rollator, shower chair.   FU appts scheduled     03/29/19 1259   Final Note   Assessment Type Final Discharge Note   Anticipated Discharge Disposition Home   What phone number can be called within the next 1-3 days to see how you are doing after discharge? [phone]   Hospital Follow Up  Appt(s) scheduled? Yes   Right Care Referral Info   Post Acute Recommendation No Care

## 2019-03-29 NOTE — PROGRESS NOTES
[facility] Hospital Medicine  Progress Note    Patient Name: Donald Warren Abadie  MRN: [id number]  Patient Class: IP- Inpatient   Admission Date: 3/15/2019  Length of Stay: 14 days  Attending Physician: DEAN Vigil MD  Primary Care Provider: Bacilio Larry MD    [facility] Team: [facility] HOSP MED S [place] Vigil MD    Subjective:     Principal Problem:Acute metabolic encephalopathy    HPI:  Mr Abadie is a 64 y/o male with PMH of left sided RCC s/p partial nephrectomy (2014), paroxysmal AFib, EtOH abuse (PSYCH offered rehabilitation but declined), HTN, HLD, and fatty liver who presented to [facility] ED on 3/15 with fatigue/malaise. There was concern that he had taken an accidental overdose of BB and flecainide because he felt that he was having tachycardia and reported taking additional doses of his prescribed metoprolol several times in the preceding days. He had also being drinking and his daughter reports hallucinations before ED admission. In ED, the EKG showed severe bradycardia. Reportedly, the patient displayed symptoms of EtOH withdrawal including anxiety, diaphoresis, agitation and hallucinations. There was concerned about a seizure prior to the patient going into into cardiac arrest.  A TVP was placed for HR in the 40s, post-ROSC. Etiology of arrest unclear but felt to be likely severe bradycardia given BB overdose.The patient remained intubated until 3/21 and was on the Cardiology service until 3/22 when MICU was consulted to assume care. During this week of admission, he was treated for alcohol withdrawals and on propofol and Versed during that time and also given a librium taper. He intermittently experience Afib/flutter and has been on a heparin infusion. Additionally, he had intermittent fevers, unclear source but was given intermittent Cefepime, Vancomycin until this was also discontinued with cultures negative. Since extubation, the patient has experienced altered mental status  of unclear etiology. He never had any focal deficits, and is oriented to place and person. The MICU assumed care on 3/22.   Hospital/ICU Course:  3/23- Continue precedex, wean as tolerated. MRI shows no acute change. Bedside swallow, dc amaya, central line and abx.   3/24: Precedex stopped, Seroquel started. Hypotensive and Tachycardic, NS bolus with improvement  Metoprolol, diltiazem restarted for Afib, intermittent RVR and he has tolerated well. Mental status slowly improving.       Interval History: Mr. Abadie is in good spirits.  He remains slightly tangential and confused, but lucid enough to rd on a logical conversation.  His daughter is present.  He did have some indigestion last night.  He does not feel anxious.  He slept well with no sundowning on the Remeron.    Review of Systems   Constitutional: Positive for fatigue. Negative for chills and fever.   HENT: Negative for congestion.    Respiratory: Negative for cough and shortness of breath.    Cardiovascular: Negative for chest pain.   Gastrointestinal: Negative for abdominal pain, constipation, diarrhea, nausea and vomiting.        +Indigestion   Neurological: Negative for dizziness and weakness.   Psychiatric/Behavioral: Positive for confusion. Negative for agitation, behavioral problems, decreased concentration and sleep disturbance. The patient is not nervous/anxious.      Objective:     Vital Signs (Most Recent):  Temp: 98 °F (36.7 °C) (03/29/19 0744)  Pulse: 72 (03/29/19 0757)  Resp: 14 (03/29/19 0744)  BP: 117/70 (03/29/19 0744)  SpO2: 99 % (03/29/19 0744) Vital Signs (24h Range):  Temp:  [97.8 °F (36.6 °C)-98 °F (36.7 °C)] 98 °F (36.7 °C)  Pulse:  [58-86] 72  Resp:  [14-18] 14  SpO2:  [91 %-100 %] 99 %  BP: ()/(51-70) 117/70     Weight: 76.5 kg (168 lb 8.7 oz)  Body mass index is 27.22 kg/m².    Intake/Output Summary (Last 24 hours) at 3/29/2019 0821  Last data filed at 3/29/2019 0600  Gross per 24 hour   Intake 1590 ml   Output 300 ml    Net 1290 ml      Physical Exam   Constitutional: He appears well-developed and well-nourished. He appears listless.  Non-toxic appearance. He has a sickly appearance. He does not appear ill. No distress.   HENT:   Head: Normocephalic and atraumatic.   Nose: Nose normal.   Mouth/Throat: Oropharynx is clear and moist. Abnormal dentition. No oropharyngeal exudate.   Eyes: Pupils are equal, round, and reactive to light. EOM are normal. Right eye exhibits no discharge. Left eye exhibits no discharge. Right conjunctiva is injected. Left conjunctiva is injected. No scleral icterus.   Neck: Normal range of motion. Neck supple. No JVD present. No tracheal deviation present. No thyromegaly present.   Cardiovascular: Normal rate, regular rhythm, normal heart sounds and intact distal pulses. Exam reveals no gallop and no friction rub.   No murmur heard.  Pulmonary/Chest: Effort normal. No accessory muscle usage or stridor. No tachypnea and no bradypnea. No respiratory distress. He has no decreased breath sounds. He has no wheezes. He has no rhonchi. He has no rales. He exhibits no tenderness.   Abdominal: Soft. Bowel sounds are normal. He exhibits no distension and no mass. There is no tenderness. There is no rebound and no guarding.   Truncal obesity   Genitourinary:   Genitourinary Comments: No amaya in place   Musculoskeletal: Normal range of motion. He exhibits no edema or tenderness.   Lymphadenopathy:     He has no cervical adenopathy.   No peripheral edema   Neurological: He appears listless. He is disoriented. He displays normal reflexes. No cranial nerve deficit. He exhibits normal muscle tone. Coordination normal. GCS eye subscore is 4. GCS verbal subscore is 5. GCS motor subscore is 6.   Alert and oriented 2/3, person/place only   Skin: Skin is warm and dry. No rash noted. No erythema. No pallor.   Less flushed and red.   Psychiatric: Judgment normal. His speech is delayed. He is slowed. Cognition and memory are  impaired. He exhibits a depressed mood. He exhibits abnormal recent memory and abnormal remote memory.   Mr. Abadie is much more alert, conversant, and lucid.  He is making logical speech with linear thoughts.   Nursing note and vitals reviewed.    ALL LABS, RADIOGRAPHS AND EKG'S PERSONALLY REVIEWED:  Significant Labs:   Recent Results (from the past 24 hour(s))   POCT glucose    Collection Time: 03/28/19 11:44 AM   Result Value Ref Range    POCT Glucose 186 (H) 70 - 110 mg/dL   POCT glucose    Collection Time: 03/28/19 11:08 PM   Result Value Ref Range    POCT Glucose 140 (H) 70 - 110 mg/dL   Comprehensive metabolic panel    Collection Time: 03/29/19  4:42 AM   Result Value Ref Range    Sodium 135 (L) 136 - 145 mmol/L    Potassium 3.9 3.5 - 5.1 mmol/L    Chloride 102 95 - 110 mmol/L    CO2 20 (L) 23 - 29 mmol/L    Glucose 133 (H) 70 - 110 mg/dL    BUN, Bld 6 (L) 8 - 23 mg/dL    Creatinine 0.9 0.5 - 1.4 mg/dL    Calcium 10.2 8.7 - 10.5 mg/dL    Total Protein 7.6 6.0 - 8.4 g/dL    Albumin 3.3 (L) 3.5 - 5.2 g/dL    Total Bilirubin 0.9 0.1 - 1.0 mg/dL    Alkaline Phosphatase 86 55 - 135 U/L    AST 69 (H) 10 - 40 U/L    ALT 89 (H) 10 - 44 U/L    Anion Gap 13 8 - 16 mmol/L    eGFR if African American >60.0 >60 mL/min/1.73 m^2    eGFR if non African American >60.0 >60 mL/min/1.73 m^2   Magnesium    Collection Time: 03/29/19  4:42 AM   Result Value Ref Range    Magnesium 1.9 1.6 - 2.6 mg/dL   Protime-INR    Collection Time: 03/29/19  4:42 AM   Result Value Ref Range    Prothrombin Time 11.5 9.0 - 12.5 sec    INR 1.1 0.8 - 1.2   CBC auto differential    Collection Time: 03/29/19  4:42 AM   Result Value Ref Range    WBC 7.34 3.90 - 12.70 K/uL    RBC 3.81 (L) 4.60 - 6.20 M/uL    Hemoglobin 12.3 (L) 14.0 - 18.0 g/dL    Hematocrit 38.0 (L) 40.0 - 54.0 %     (H) 82 - 98 fL    MCH 32.3 (H) 27.0 - 31.0 pg    MCHC 32.4 32.0 - 36.0 g/dL    RDW 12.9 11.5 - 14.5 %    Platelets 309 150 - 350 K/uL    MPV 9.4 9.2 - 12.9 fL     Immature Granulocytes 1.1 (H) 0.0 - 0.5 %    Gran # (ANC) 4.5 1.8 - 7.7 K/uL    Immature Grans (Abs) 0.08 (H) 0.00 - 0.04 K/uL    Lymph # 1.6 1.0 - 4.8 K/uL    Mono # 0.8 0.3 - 1.0 K/uL    Eos # 0.2 0.0 - 0.5 K/uL    Baso # 0.17 0.00 - 0.20 K/uL    nRBC 0 0 /100 WBC    Gran% 61.4 38.0 - 73.0 %    Lymph% 22.1 18.0 - 48.0 %    Mono% 10.4 4.0 - 15.0 %    Eosinophil% 2.7 0.0 - 8.0 %    Basophil% 2.3 (H) 0.0 - 1.9 %    Differential Method Automated    Phosphorus    Collection Time: 03/29/19  4:42 AM   Result Value Ref Range    Phosphorus 3.7 2.7 - 4.5 mg/dL   POCT glucose    Collection Time: 03/29/19  7:47 AM   Result Value Ref Range    POCT Glucose 145 (H) 70 - 110 mg/dL     MELD-Na score: 7 at 3/29/2019  4:42 AM  MELD score: 7 at 3/29/2019  4:42 AM  Calculated from:  Serum Creatinine: 0.9 mg/dL (Rounded to 1 mg/dL) at 3/29/2019  4:42 AM  Serum Sodium: 135 mmol/L at 3/29/2019  4:42 AM  Total Bilirubin: 0.9 mg/dL (Rounded to 1 mg/dL) at 3/29/2019  4:42 AM  INR(ratio): 1.1 at 3/29/2019  4:42 AM  Age: 63 years     Significant Imaging:     None in the last 24 hours      Current Facility-Administered Medications:     acetaminophen tablet 650 mg, 650 mg, Oral, Q6H PRN, Fadia Bassett MD, 650 mg at 03/27/19 1050    albuterol-ipratropium 2.5 mg-0.5 mg/3 mL nebulizer solution 3 mL, 3 mL, Nebulization, Q4H PRN, Quique Gonzalez MD, 3 mL at 03/22/19 8647    apixaban tablet 5 mg, 5 mg, Oral, BID, Heather JULIA Cedillo, NP, 5 mg at 03/29/19 0852    cyanocobalamin tablet 1,000 mcg, 1,000 mcg, Oral, Daily, DEAN Vigil MD, 1,000 mcg at 03/29/19 0852    dextrose 50% injection 12.5 g, 12.5 g, Intravenous, PRN, Heather DUMONT Dauterive, NP    dextrose 50% injection 25 g, 25 g, Intravenous, PRN, Heather DUMONT Dauterive, NP    folic acid tablet 1 mg, 1 mg, Oral, Daily, DEAN Vigil MD, 1 mg at 03/29/19 0852    glucagon (human recombinant) injection 1 mg, 1 mg, Intramuscular, PRN, Heather Cedillo NP    glucose chewable tablet 16  g, 16 g, Oral, PRN, Heather B. Dauterive, NP    glucose chewable tablet 24 g, 24 g, Oral, PRN, Heather B. Dauterive, NP    insulin aspart U-100 pen 0-5 Units, 0-5 Units, Subcutaneous, QID (AC + HS) PRN, Heather B. Dauterive, NP    lactulose 20 gram/30 mL solution Soln 20 g, 20 g, Oral, TID, Tonny Bernal MD, 20 g at 03/29/19 0852    magnesium oxide tablet 400 mg, 400 mg, Oral, Daily, DEAN Vigil MD, 400 mg at 03/29/19 0852    metoprolol tartrate (LOPRESSOR) split tablet 12.5 mg, 12.5 mg, Oral, BID, DEAN Vigil MD, 12.5 mg at 03/29/19 0852    miconazole NITRATE 2 % top powder, , Topical (Top), BID, Hal Funk MD    mirtazapine tablet 7.5 mg, 7.5 mg, Oral, QHS, Ivelisse Huffman DO, 7.5 mg at 03/28/19 2151    multivitamin tablet 1 tablet, 1 tablet, Oral, Daily, DEAN Vigil MD, 1 tablet at 03/29/19 0852    pantoprazole EC tablet 40 mg, 40 mg, Oral, Daily, DEAN Vigil MD, 40 mg at 03/29/19 0852    thiamine tablet 100 mg, 100 mg, Oral, Daily, Ramon Livingston MD, 100 mg at 03/29/19 0852  Assessment/Plan:      Acute metabolic encephalopathy  -Likely multifactorial from ETOH abuse and withdrawal, delirium, arrest, intubation and sedation  -EEG on admission negative  -Delirium precautions  -CT on admission and on 3/22 shows no acute process  -MRI shows no acute abnormality and small remote left occipital lobe infarct  -thiamine tablet 100 mg, 100 mg, Oral, Daily  -QUEtiapine tablet 25 mg, 25 mg, Oral, QHS   -Daughter feels this is over sedating, so stopping  -Started Haldol 2mg po QHS on 3/26/19 with good results  -Required 5mg of IV Haldol night  Of 3/25/19 due to severe agitation  -Awaiting final Psychiatry consult recommendations from today as well    Bradycardia  -Likely from Diltiazem and Lopressor combo   -Reduced Lopressor 25mg--.>12.5mg PO BID on 3/27   -Discontinue the Diltiazem on 3/27d  -Mostly in 60's, but does drop to 40's at times  -Checked EKG: Just sinus  bradycardia    ETOH abuse and addiction  Sleep disturbance  -Psychiatry consult noted and appreciated  -Started Remeron 7.5mg po Q HS on 3/27  Per psych:  Alcohol use disorder, severe, in controlled environment  · Patient has been treated for acute alcohol withdrawal, not presently symptomatic  · Recommend continued treatment with thiamine 100mg po qdaily, would offer IV if patient has IV access; has received 5 days IV tx during stay  · Patient motivated for rehab, AA, no prior treatment in these settings; recommend dispo to residential rehab if possible   · Resource sheet for rehab and ABU provided to patient and family   Need for Continued Hospitalization:   No need for inpatient psychiatric hospitalization. Continue medical care as per the primary team.    Alcohol withdrawal, resolved  -Originally on Precedex infusion, then completed benzo taper    Hypokalemia  -potassium bicarbonate disintegrating tablet 50 mEq, 50 mEq, Oral, Once  -Keep K>4, Mg>2    Hypomagnesemia  -magnesium sulfate 3g in water 50mL IVPB (premix), Intravenous, Once    Cardiopulmonary arrest  -Unsure of etiology for sure  -Thinking was overdose of home beta blocker induced severe bradycardia, then arrest   -Glucagon was given in ED       Paroxysmal atrial fibrillation  -Holding home flecainide  -Started running bradycardic on 3/27/19 to 40's:  -diltiaZEM 24 hr capsule 120 mg, 120 mg, Oral, Daily was discontinued after am dose on 3/27/19  -metoprolol tartrate (LOPRESSOR) tablet 25 mg Oral, BID reduced to 12.5mg on 3/27/19  -Anticoagulation as per below    Long term use of anticoagulation  -apixaban tablet 5 mg, 5 mg, Oral, BID      Diabetes mellitus without complication  -insulin aspart U-100 pen 0-5 Units, 0-5 Units, Subcutaneous, QID (AC + HS) PRN     Elevated LFTs  -lactulose 20 gram/30 mL solution Soln 20 g, 20 g, Oral, TID  -INR normal with low MELD: no high suspicion for severe cirrhosis    Macrocytic anemia  -Likely due to ETOH  abuse  -Add MVI, Folic Acid 1mg po qday  -B12 1000mcg po QDAY        VTE Risk Mitigation (From admission, onward)        Ordered     apixaban tablet 5 mg  2 times daily      03/25/19 1058     IP VTE HIGH RISK PATIENT  Once      03/15/19 0815             [place] Vigil MD  Department of Hospital Medicine   [facility]

## 2019-03-29 NOTE — PT/OT/SLP PROGRESS
Physical Therapy Treatment/Discharge    Patient Name:  Donald Warren Abadie   MRN:  [id number]    Recommendations:     Discharge Recommendations:  home health PT   Discharge Equipment Recommendations: shower chair(rollator)   Barriers to discharge: None    Assessment:     Donald Warren Abadie is a 63 y.o. male admitted with a medical diagnosis of Acute metabolic encephalopathy.  He presents with the following impairments/functional limitations:  weakness, impaired endurance, impaired functional mobilty, gait instability, impaired balance, pain, decreased safety awareness Pt. cooperative and tolerated treatment well. Pt. progressing with mobility.    Rehab Prognosis: Good; patient would benefit from acute skilled PT services to address these deficits and reach maximum level of function.    Recent Surgery: Procedure(s) (LRB):  CARDIOVERSION OR DEFIBRILLATION (N/A)  ECHOCARDIOGRAM,TRANSESOPHAGEAL (N/A)      Plan:     During this hospitalization, patient to be seen (Discontinue acute PT) to address the identified rehab impairments via gait training, therapeutic activities, therapeutic exercises and progress toward the following goals:    · Plan of Care Expires:  04/20/19    Subjective     Chief Complaint: (R) knee discomfort during gait  Patient/Family Comments/goals: to go home  Pain/Comfort:  · Pain Rating 1: (pt. did not rate)  · Location - Side 1: Right  · Location - Orientation 1: generalized  · Location 1: knee  · Pain Addressed 1: Reposition, Cessation of Activity      Objective:     Communicated with nursing prior to session.  Patient found seated EOB with peripheral IV, telemetry upon PT entry to room.     General Precautions: Standard, fall, aspiration   Orthopedic Precautions:N/A   Braces:       Functional Mobility:  · Transfers:     · Sit to Stand:  supervision with no AD  · Gait: 230' and 300' with SC and SBA-Supervision without LOB. Pt. had seated rest break between gait trials.   · Balance: fair+      AM-PAC  6 CLICK MOBILITY  Turning over in bed (including adjusting bedclothes, sheets and blankets)?: 4  Sitting down on and standing up from a chair with arms (e.g., wheelchair, bedside commode, etc.): 4  Moving from lying on back to sitting on the side of the bed?: 4  Moving to and from a bed to a chair (including a wheelchair)?: 4  Need to walk in hospital room?: 3  Climbing 3-5 steps with a railing?: 3  Basic Mobility Total Score: 22       Therapeutic Activities and Exercises:   Discussed proper use of SC, DME needs, goals, and POC.    Patient left seated on EOB with all lines intact and call button in reach..    GOALS:   Multidisciplinary Problems     Physical Therapy Goals     Not on file          Multidisciplinary Problems (Resolved)        Problem: Physical Therapy Goal    Goal Priority Disciplines Outcome Goal Variances Interventions   Physical Therapy Goal   (Resolved)     PT, PT/OT Outcome(s) achieved     Description:  Goals to be met by: 19    Patient will increase functional independence with mobility by performin. Supine to sit with Contact Guard Assistance -not met  2. Sit to stand transfer with Contact Guard Assistance - met       Revised: Sit to stand transfer with Supervision - Not met  3. Gait  x 150 feet with Contact Guard Assistance using AD if needed.- met       Revised: Gait  x 250 feet with Supervision using AD, if needed.- Not met                       Time Tracking:     PT Received On: 19  PT Start Time: 1302     PT Stop Time: 1318  PT Total Time (min): 16 min     Billable Minutes: Gait Training 16    Treatment Type: Treatment  PT/PTA: PT     PTA Visit Number: 0     Quique Padilla, PT  2019

## 2019-03-29 NOTE — PLAN OF CARE
Problem: Occupational Therapy Goal  Goal: Occupational Therapy Goal  Goals to be met by: 4/9/19  Patient will increase functional independence with ADLs by performing:    UE Dressing with Modified [place].  LE Dressing with Modified [place].  Grooming while standing with Modified [place].  Toileting from toilet with Modified [place] for hygiene and clothing management.   Step transfer with Modified [place]  Upper extremity exercise program x15 reps per handout, with independence.     Outcome: Outcome(s) achieved Date Met: 03/29/19  Pt met goals related to OT POC.   Cheli Aguirre OT  3/29/2019

## 2019-03-29 NOTE — PLAN OF CARE
[facility]    HOME HEALTH ORDERS  FACE TO FACE ENCOUNTER    Patient Name: Donald Warren Abadie  YOB: 1955    PCP: Bacilio Larry MD   PCP Address: [address] / [place] [address]  PCP Phone Number: [phone]  PCP Fax: [phone]    Encounter Date: 03/29/2019    Admit to Home Health    Diagnoses:  Active Hospital Problems    Diagnosis  POA    *Acute metabolic encephalopathy [G93.41]  Yes    Substance induced mood disorder [F19.94]  Yes    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 [T44.7X1A]  Yes    Alcohol use disorder, severe, in controlled environment [F10.20]  Yes    Cardiac arrest [I46.9]  Yes    Diabetes mellitus without complication [E11.9]  Yes    Paroxysmal atrial fibrillation [I48.0]  Yes    Alcohol withdrawal [F10.239]  Yes    Elevated LFTs [R94.5]  Yes      Resolved Hospital Problems    Diagnosis Date Resolved POA    Fever [R50.9] 03/25/2019 No    Sepsis [A41.9] 03/22/2019 Yes    Chest pain [R07.9] 03/15/2019 Yes    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 [T44.7X1A] 03/22/2019 Yes    On mechanically assisted ventilation [Z99.11] 03/22/2019 Not Applicable       Future Appointments   Date Time Provider Department Center   4/2/2019  1:00 PM Natalie Austin MD [facility] CARD[facility] [place] [facility]     Follow-up Information     Bacilio Larry MD In 2 weeks.    Specialty:  Internal Medicine  Contact information:  [address]  [facility] [address]  [phone]             Brandon Calhoun MD In 2 weeks.    Specialties:  Electrophysiology, Cardiology  Contact information:  [address]  [facility] [address]  [phone]                     I have seen and examined this patient face to face today. My clinical findings that support the need for the home health skilled services and home bound status are the following:  Weakness/numbness causing balance and gait disturbance due to Liver Disease,  Weakness/Debility, Dehydration and ETOH encephalopathy making it taxing to leave home.  Requiring assistive device to leave home due to unsteady gait caused by  Weakness/Debility, Dehydration and ETOH encephalopathy, Afib with bradycardia.  Patient with medication mismanagement issues requiring home bound status as evidenced by  Unstable vital signs (blood pressure, heart rate), Poor understanding of medication regimen/dosage and Poor adherence to medication regimen/dosage.  Medical restrictions requiring assistance of another human to leave home due to  Dyspnea on exertion (SOB), Unstable ambulation, Frequent Falls and Decreased range of motions in extremities.  Mental confusion making it unsafe for patient to leave home alone due to  Acute Delirium.    Allergies:  Review of patient's allergies indicates:   Allergen Reactions    No known drug allergies        Diet: cardiac diet    Activities: ambulate in house with assistance and no driving until follow up evaluation with PCP and cleared by him    Nursing:   SN to complete comprehensive assessment including routine vital signs. Instruct on disease process and s/s of complications to report to MD. Review/verify medication list sent home with the patient at time of discharge  and instruct patient/caregiver as needed. Frequency may be adjusted depending on start of care date.    Notify MD if SBP > 160 or < 90; DBP > 90 or < 50; HR > 120 or < 50; Temp > 101; Other:         CONSULTS:    Physical Therapy to evaluate and treat. Evaluate for home safety and equipment needs; Establish/upgrade home exercise program. Perform / instruct on therapeutic exercises, gait training, transfer training, and Range of Motion.  Occupational Therapy to evaluate and treat. Evaluate home environment for safety and equipment needs. Perform/Instruct on transfers, ADL training, ROM, and therapeutic exercises.    MISCELLANEOUS CARE:  N/A    WOUND CARE ORDERS  n/a      Medications: Review  discharge medications with patient and family and provide education.      Current Discharge Medication List      START taking these medications    Details   cyanocobalamin (VITAMIN B-12) 1000 MCG tablet Take 1 tablet (1,000 mcg total) by mouth once daily.      folic acid (FOLVITE) 1 MG tablet Take 1 tablet (1 mg total) by mouth once daily.  Qty: 30 tablet, Refills: 11      mirtazapine (REMERON) 7.5 MG Tab Take 1 tablet (7.5 mg total) by mouth every evening.  Qty: 30 tablet, Refills: 11      multivitamin (THERAGRAN) tablet Take 1 tablet by mouth once daily.      pantoprazole (PROTONIX) 40 MG tablet Take 1 tablet (40 mg total) by mouth once daily.  Qty: 30 tablet, Refills: 11      thiamine 100 MG tablet Take 1 tablet (100 mg total) by mouth once daily.         CONTINUE these medications which have CHANGED    Details   metoprolol tartrate (LOPRESSOR) 25 MG tablet Take 0.5 tablets (12.5 mg total) by mouth 2 (two) times daily.  Qty: 30 tablet, Refills: 11         CONTINUE these medications which have NOT CHANGED    Details   allopurinol (ZYLOPRIM) 100 MG tablet TAKE 2 TABLETS BY MOUTH DAILY  Qty: 60 tablet, Refills: 0      apixaban (ELIQUIS) 5 mg Tab Take 1 tablet (5 mg total) by mouth 2 (two) times daily.  Qty: 60 tablet, Refills: 11    Associated Diagnoses: Paroxysmal atrial fibrillation      diltiaZEM (DILACOR XR) 120 MG CDCR Take 1 capsule (120 mg total) by mouth once daily.  Qty: 30 capsule, Refills: 11    Associated Diagnoses: Paroxysmal atrial fibrillation      fenofibrate 160 MG Tab Take 1 tablet (160 mg total) by mouth every evening.  Qty: 90 tablet, Refills: 3      flecainide (TAMBOCOR) 150 MG Tab Take 1 tablet (150 mg total) by mouth 2 (two) times daily.  Qty: 60 tablet, Refills: 11    Associated Diagnoses: Paroxysmal atrial fibrillation      omega-3 acid ethyl esters (LOVAZA) 1 gram capsule Take 2 g by mouth 2 (two) times daily.      ranitidine (ZANTAC) 150 MG capsule Take 150 mg by mouth 2 (two) times daily.       rosuvastatin (CRESTOR) 10 MG tablet Take 1 tablet (10 mg total) by mouth once daily.  Qty: 90 tablet, Refills: 3    Associated Diagnoses: Hypertriglyceridemia      aspirin 81 MG Chew Take 1 tablet (81 mg total) by mouth once daily.  Qty: 30 tablet, Refills: 11    Associated Diagnoses: Hyperlipidemia; Alcohol abuse; Elevated LFTs         STOP taking these medications       LORazepam (ATIVAN) 0.5 MG tablet Comments:   Reason for Stopping:         venlafaxine (EFFEXOR) 37.5 MG Tab Comments:   Reason for Stopping:               I certify that this patient is confined to his home and needs physical therapy and occupational therapy.

## 2019-03-29 NOTE — PLAN OF CARE
Problem: Adult Inpatient Plan of Care  Goal: Plan of Care Review  Outcome: Ongoing (interventions implemented as appropriate)     03/29/19 7621   Plan of Care Review   Plan of Care Reviewed With patient   AAO x 4, denies pain :tele hr 60-66 : pt supervised with activity : [place] , blood glucose 140, provided with pm snacks : safety rounds performed : Camera for safety : will cont to monitor.

## 2019-03-29 NOTE — NURSING
Pt to be d/c via family transport. PIV removed. D/c education given to Pt and family. Medications to be picked up at personal pharmacy. Belongings with with Pt. All questions answered. VSS. Will continue to monitor.

## 2019-04-02 ENCOUNTER — OFFICE VISIT (OUTPATIENT)
Dept: CARDIOLOGY | Facility: CLINIC | Age: 64
End: 2019-04-02
Payer: COMMERCIAL

## 2019-04-02 VITALS
DIASTOLIC BLOOD PRESSURE: 53 MMHG | WEIGHT: 176.38 LBS | SYSTOLIC BLOOD PRESSURE: 100 MMHG | HEIGHT: 66 IN | BODY MASS INDEX: 28.34 KG/M2 | HEART RATE: 73 BPM

## 2019-04-02 DIAGNOSIS — K21.9 GASTROESOPHAGEAL REFLUX DISEASE, ESOPHAGITIS PRESENCE NOT SPECIFIED: Primary | ICD-10-CM

## 2019-04-02 DIAGNOSIS — F10.20 ALCOHOL USE DISORDER, SEVERE, IN CONTROLLED ENVIRONMENT: ICD-10-CM

## 2019-04-02 DIAGNOSIS — I48.0 PAROXYSMAL ATRIAL FIBRILLATION: ICD-10-CM

## 2019-04-02 PROBLEM — F19.94 SUBSTANCE INDUCED MOOD DISORDER: Chronic | Status: ACTIVE | Noted: 2019-03-27

## 2019-04-02 PROCEDURE — 99214 OFFICE O/P EST MOD 30 MIN: CPT | Mod: S$GLB,,, | Performed by: STUDENT IN AN ORGANIZED HEALTH CARE EDUCATION/TRAINING PROGRAM

## 2019-04-02 PROCEDURE — 99214 PR OFFICE/OUTPT VISIT, EST, LEVL IV, 30-39 MIN: ICD-10-PCS | Mod: S$GLB,,, | Performed by: STUDENT IN AN ORGANIZED HEALTH CARE EDUCATION/TRAINING PROGRAM

## 2019-04-02 PROCEDURE — 99999 PR PBB SHADOW E&M-EST. PATIENT-LVL V: CPT | Mod: PBBFAC,,, | Performed by: STUDENT IN AN ORGANIZED HEALTH CARE EDUCATION/TRAINING PROGRAM

## 2019-04-02 PROCEDURE — 3074F PR MOST RECENT SYSTOLIC BLOOD PRESSURE < 130 MM HG: ICD-10-PCS | Mod: CPTII,S$GLB,, | Performed by: STUDENT IN AN ORGANIZED HEALTH CARE EDUCATION/TRAINING PROGRAM

## 2019-04-02 PROCEDURE — 3008F BODY MASS INDEX DOCD: CPT | Mod: CPTII,S$GLB,, | Performed by: STUDENT IN AN ORGANIZED HEALTH CARE EDUCATION/TRAINING PROGRAM

## 2019-04-02 PROCEDURE — 3078F PR MOST RECENT DIASTOLIC BLOOD PRESSURE < 80 MM HG: ICD-10-PCS | Mod: CPTII,S$GLB,, | Performed by: STUDENT IN AN ORGANIZED HEALTH CARE EDUCATION/TRAINING PROGRAM

## 2019-04-02 PROCEDURE — 3008F PR BODY MASS INDEX (BMI) DOCUMENTED: ICD-10-PCS | Mod: CPTII,S$GLB,, | Performed by: STUDENT IN AN ORGANIZED HEALTH CARE EDUCATION/TRAINING PROGRAM

## 2019-04-02 PROCEDURE — 3074F SYST BP LT 130 MM HG: CPT | Mod: CPTII,S$GLB,, | Performed by: STUDENT IN AN ORGANIZED HEALTH CARE EDUCATION/TRAINING PROGRAM

## 2019-04-02 PROCEDURE — 99999 PR PBB SHADOW E&M-EST. PATIENT-LVL V: ICD-10-PCS | Mod: PBBFAC,,, | Performed by: STUDENT IN AN ORGANIZED HEALTH CARE EDUCATION/TRAINING PROGRAM

## 2019-04-02 PROCEDURE — 3078F DIAST BP <80 MM HG: CPT | Mod: CPTII,S$GLB,, | Performed by: STUDENT IN AN ORGANIZED HEALTH CARE EDUCATION/TRAINING PROGRAM

## 2019-04-02 NOTE — PROGRESS NOTES
Subjective:    Patient ID:  Donald Warren Abadie is a 63 y.o. male who presents for follow-up of Acute metabolic encephalopathy (hospital d/c 3/29/19); Chest Pain; and Medication Management      HPI  62 yo M well known to me PMH Afib, borderline DM, Alcohol abuse, Normal EF 50%, recently D/C 3/29 after cardiac arrest believed 2/2 severe bradycardia after alchol binge and Metoprolol + flecainide overdose. He was d/c'd on lower BB doses and after alcohol detox. He looks good today, in NSR, No s/s HF. HR 73. . Has not drank since d/c and planning for out patient vs in patient rehab. I advised him to see a GI doctor for his recurrent heartburn and limit tylenol to daily use. Stop diltiazem for now.   Review of Systems   Constitution: Negative for chills, decreased appetite and diaphoresis.   HENT: Negative for congestion and ear discharge.    Eyes: Negative for blurred vision and discharge.   Cardiovascular: Negative for chest pain, dyspnea on exertion, irregular heartbeat, leg swelling and paroxysmal nocturnal dyspnea.   Respiratory: Negative for cough, hemoptysis and shortness of breath.    Gastrointestinal: Negative for abdominal pain.        Objective:    Physical Exam   Constitutional: He is oriented to person, place, and time. He appears well-developed and well-nourished. No distress.   Eyes: Pupils are equal, round, and reactive to light. Conjunctivae are normal.   Neck: No tracheal deviation present. No thyromegaly present.   Cardiovascular: Normal rate, regular rhythm, normal heart sounds and intact distal pulses. Exam reveals no gallop and no friction rub.   No murmur heard.  Pulmonary/Chest: Effort normal and breath sounds normal. No respiratory distress. He has no wheezes. He has no rales.   Abdominal: Soft. Bowel sounds are normal. He exhibits no distension. There is no tenderness.   Musculoskeletal: He exhibits no edema or deformity.   Neurological: He is alert and oriented to person, place, and  time. No cranial nerve deficit. Coordination normal.   Skin: Skin is warm and dry. He is not diaphoretic.   Psychiatric: He has a normal mood and affect. His behavior is normal.         Assessment:       No diagnosis found.     Plan:            Paroxysmal atrial fibrillation  - Doing well in NSR today HR 73  - CHADSVASC 2  - Continue 12.5 bid Metoprolol, Felcainide 150 and Eliquis 5 bid  - Stop diltiazem      Alcohol use disorder, severe, in controlled environment  - Currently not drinking since d/c encouraged to go to rehab    GERD (gastroesophageal reflux disease)  - Referal to GI for high risk patient with GERD despite PPI/H2 Blocker

## 2019-04-02 NOTE — ASSESSMENT & PLAN NOTE
- Doing well in NSR today HR 73  - CHADSVASC 2  - Continue 12.5 bid Metoprolol, Felcainide 150 and Eliquis 5 bid  - Stop diltiazem

## 2019-04-03 ENCOUNTER — TELEPHONE (OUTPATIENT)
Dept: CARDIOLOGY | Facility: CLINIC | Age: 64
End: 2019-04-03

## 2019-04-03 NOTE — TELEPHONE ENCOUNTER
Dr Austin, The pt's daughter, Chani is calling to report that the pt's BP has been on the low side - today 97/62 - informed that this is a normal BP. Says he is feeling tired. Says SBP last Saturday after his d/c was 80/, and is usually in the 90's/, the highest has been 112/.  Says is taking metoprolol tartrate one half of 25 mg twice a day. Is requesting to have parameters on holding his metoprolol for low BP.  Please advise. Thanks, Ila

## 2019-04-03 NOTE — TELEPHONE ENCOUNTER
----- Message from Whitney Dickerson sent at 4/3/2019  9:28 AM CDT -----  Contact: patient's daughter  The Pt's daughter wants to know what should his BP be while taking his metoprolol tartrate (LOPRESSOR) 25 MG tablet and flecainide (TAMBOCOR) 150 MG Tab. His BP has been low 97/62. Please call her back @ [phone]. Thanks, Whitney  4/2/2019 Dr. Edgar Aly

## 2019-04-04 ENCOUNTER — TELEPHONE (OUTPATIENT)
Dept: CARDIOLOGY | Facility: CLINIC | Age: 64
End: 2019-04-04

## 2019-04-04 NOTE — TELEPHONE ENCOUNTER
I called the patients' daughter and discussed the patients' blood pressure with her. I discussed the case with EP Dr Giang who knows the patient. We have decided to STOP the Metoprolol 12.5 bid at this time to prevent low BP (although at this dose unlikely to cause hypotension) and continue Flecainide 150mg. We will bring him in to see Dr. Giang in 6 weeks. I have advised her NOT to take the Metoprolol if BP <90/60.

## 2019-04-09 ENCOUNTER — OFFICE VISIT (OUTPATIENT)
Dept: INTERNAL MEDICINE | Facility: CLINIC | Age: 64
End: 2019-04-09
Payer: COMMERCIAL

## 2019-04-09 ENCOUNTER — TELEPHONE (OUTPATIENT)
Dept: ADMINISTRATIVE | Facility: CLINIC | Age: 64
End: 2019-04-09

## 2019-04-09 ENCOUNTER — LAB VISIT (OUTPATIENT)
Dept: LAB | Facility: HOSPITAL | Age: 64
End: 2019-04-09
Attending: INTERNAL MEDICINE
Payer: COMMERCIAL

## 2019-04-09 VITALS
OXYGEN SATURATION: 96 % | HEIGHT: 66 IN | BODY MASS INDEX: 28.77 KG/M2 | DIASTOLIC BLOOD PRESSURE: 64 MMHG | HEART RATE: 65 BPM | SYSTOLIC BLOOD PRESSURE: 112 MMHG | WEIGHT: 179 LBS | TEMPERATURE: 98 F

## 2019-04-09 DIAGNOSIS — Z79.4 TYPE 2 DIABETES MELLITUS WITHOUT COMPLICATION, WITH LONG-TERM CURRENT USE OF INSULIN: ICD-10-CM

## 2019-04-09 DIAGNOSIS — I48.91 ATRIAL FIBRILLATION WITH RVR: ICD-10-CM

## 2019-04-09 DIAGNOSIS — F41.9 ANXIETY: ICD-10-CM

## 2019-04-09 DIAGNOSIS — F10.10 ETOH ABUSE: ICD-10-CM

## 2019-04-09 DIAGNOSIS — E11.9 TYPE 2 DIABETES MELLITUS WITHOUT COMPLICATION, WITH LONG-TERM CURRENT USE OF INSULIN: ICD-10-CM

## 2019-04-09 DIAGNOSIS — G93.41 METABOLIC ENCEPHALOPATHY: ICD-10-CM

## 2019-04-09 DIAGNOSIS — G93.41 METABOLIC ENCEPHALOPATHY: Primary | ICD-10-CM

## 2019-04-09 LAB
ALBUMIN SERPL BCP-MCNC: 3.9 G/DL (ref 3.5–5.2)
ALP SERPL-CCNC: 81 U/L (ref 55–135)
ALT SERPL W/O P-5'-P-CCNC: 13 U/L (ref 10–44)
ANION GAP SERPL CALC-SCNC: 10 MMOL/L (ref 8–16)
AST SERPL-CCNC: 31 U/L (ref 10–40)
BASOPHILS # BLD AUTO: 0.05 K/UL (ref 0–0.2)
BASOPHILS NFR BLD: 0.7 % (ref 0–1.9)
BILIRUB SERPL-MCNC: 0.5 MG/DL (ref 0.1–1)
BUN SERPL-MCNC: 7 MG/DL (ref 8–23)
CALCIUM SERPL-MCNC: 10.2 MG/DL (ref 8.7–10.5)
CHLORIDE SERPL-SCNC: 104 MMOL/L (ref 95–110)
CO2 SERPL-SCNC: 24 MMOL/L (ref 23–29)
CREAT SERPL-MCNC: 1 MG/DL (ref 0.5–1.4)
DIFFERENTIAL METHOD: ABNORMAL
EOSINOPHIL # BLD AUTO: 0.3 K/UL (ref 0–0.5)
EOSINOPHIL NFR BLD: 4.2 % (ref 0–8)
ERYTHROCYTE [DISTWIDTH] IN BLOOD BY AUTOMATED COUNT: 13.5 % (ref 11.5–14.5)
EST. GFR  (AFRICAN AMERICAN): >60 ML/MIN/1.73 M^2
EST. GFR  (NON AFRICAN AMERICAN): >60 ML/MIN/1.73 M^2
ESTIMATED AVG GLUCOSE: 140 MG/DL (ref 68–131)
GLUCOSE SERPL-MCNC: 115 MG/DL (ref 70–110)
HBA1C MFR BLD HPLC: 6.5 % (ref 4–5.6)
HCT VFR BLD AUTO: 37.8 % (ref 40–54)
HGB BLD-MCNC: 12.3 G/DL (ref 14–18)
LYMPHOCYTES # BLD AUTO: 1.8 K/UL (ref 1–4.8)
LYMPHOCYTES NFR BLD: 26 % (ref 18–48)
MCH RBC QN AUTO: 31.7 PG (ref 27–31)
MCHC RBC AUTO-ENTMCNC: 32.5 G/DL (ref 32–36)
MCV RBC AUTO: 97 FL (ref 82–98)
MONOCYTES # BLD AUTO: 0.5 K/UL (ref 0.3–1)
MONOCYTES NFR BLD: 6.9 % (ref 4–15)
NEUTROPHILS # BLD AUTO: 4.2 K/UL (ref 1.8–7.7)
NEUTROPHILS NFR BLD: 61.6 % (ref 38–73)
PLATELET # BLD AUTO: 238 K/UL (ref 150–350)
PMV BLD AUTO: 8.6 FL (ref 9.2–12.9)
POTASSIUM SERPL-SCNC: 4.3 MMOL/L (ref 3.5–5.1)
PROT SERPL-MCNC: 7.3 G/DL (ref 6–8.4)
RBC # BLD AUTO: 3.88 M/UL (ref 4.6–6.2)
SODIUM SERPL-SCNC: 138 MMOL/L (ref 136–145)
WBC # BLD AUTO: 6.84 K/UL (ref 3.9–12.7)

## 2019-04-09 PROCEDURE — 3078F PR MOST RECENT DIASTOLIC BLOOD PRESSURE < 80 MM HG: ICD-10-PCS | Mod: CPTII,S$GLB,, | Performed by: INTERNAL MEDICINE

## 2019-04-09 PROCEDURE — 83036 HEMOGLOBIN GLYCOSYLATED A1C: CPT

## 2019-04-09 PROCEDURE — 85025 COMPLETE CBC W/AUTO DIFF WBC: CPT

## 2019-04-09 PROCEDURE — 99214 PR OFFICE/OUTPT VISIT, EST, LEVL IV, 30-39 MIN: ICD-10-PCS | Mod: S$GLB,,, | Performed by: INTERNAL MEDICINE

## 2019-04-09 PROCEDURE — 3008F BODY MASS INDEX DOCD: CPT | Mod: CPTII,S$GLB,, | Performed by: INTERNAL MEDICINE

## 2019-04-09 PROCEDURE — 36415 COLL VENOUS BLD VENIPUNCTURE: CPT

## 2019-04-09 PROCEDURE — 3074F PR MOST RECENT SYSTOLIC BLOOD PRESSURE < 130 MM HG: ICD-10-PCS | Mod: CPTII,S$GLB,, | Performed by: INTERNAL MEDICINE

## 2019-04-09 PROCEDURE — 3008F PR BODY MASS INDEX (BMI) DOCUMENTED: ICD-10-PCS | Mod: CPTII,S$GLB,, | Performed by: INTERNAL MEDICINE

## 2019-04-09 PROCEDURE — 99214 OFFICE O/P EST MOD 30 MIN: CPT | Mod: S$GLB,,, | Performed by: INTERNAL MEDICINE

## 2019-04-09 PROCEDURE — 3074F SYST BP LT 130 MM HG: CPT | Mod: CPTII,S$GLB,, | Performed by: INTERNAL MEDICINE

## 2019-04-09 PROCEDURE — 3044F HG A1C LEVEL LT 7.0%: CPT | Mod: CPTII,S$GLB,, | Performed by: INTERNAL MEDICINE

## 2019-04-09 PROCEDURE — 3044F PR MOST RECENT HEMOGLOBIN A1C LEVEL <7.0%: ICD-10-PCS | Mod: CPTII,S$GLB,, | Performed by: INTERNAL MEDICINE

## 2019-04-09 PROCEDURE — 99999 PR PBB SHADOW E&M-EST. PATIENT-LVL IV: ICD-10-PCS | Mod: PBBFAC,,, | Performed by: INTERNAL MEDICINE

## 2019-04-09 PROCEDURE — 80053 COMPREHEN METABOLIC PANEL: CPT

## 2019-04-09 PROCEDURE — 3078F DIAST BP <80 MM HG: CPT | Mod: CPTII,S$GLB,, | Performed by: INTERNAL MEDICINE

## 2019-04-09 PROCEDURE — 99999 PR PBB SHADOW E&M-EST. PATIENT-LVL IV: CPT | Mod: PBBFAC,,, | Performed by: INTERNAL MEDICINE

## 2019-04-09 RX ORDER — MIRTAZAPINE 15 MG/1
15 TABLET, FILM COATED ORAL NIGHTLY
Qty: 30 TABLET | Refills: 11 | Status: SHIPPED | OUTPATIENT
Start: 2019-04-09 | End: 2019-04-30

## 2019-04-10 ENCOUNTER — TELEPHONE (OUTPATIENT)
Dept: INTERNAL MEDICINE | Facility: CLINIC | Age: 64
End: 2019-04-10

## 2019-04-10 NOTE — TELEPHONE ENCOUNTER
----- Message from Bacilio Larry MD sent at 4/9/2019  6:42 PM CDT -----  Please call the patient regarding his abnormal result. Please contact patient's daughter, Chani. Notify that his liver enzymes looked better. His A1C was 6.5. Did they get an appointment with Psych? I would like to see him back in one month.

## 2019-04-10 NOTE — TELEPHONE ENCOUNTER
Daughter returned call. She was informed. Psychiatry appointment is in the making.  Message sent back to the practice to book 1 month f/u.

## 2019-04-10 NOTE — PROGRESS NOTES
CC:  Hospital follow-up    HPI:  The patient is a 63-year-old gentleman who is currently being treated for atrial fibrillation, hypertension, elevated triglycerides and diabetes who comes in today as a hospital follow-up for ETOH.  The patient had been drinking heavily prior to his admission.  The patient did go into cardiac arrest while in emergency room which was felt be due to the patient taking more of his beta-blocker because his heart was racing.  He is accompanied today by his daughter, Chani.  They are in the process trying to get outpatient rehab.  Patient is currently staying with his daughter for now.  He is currently alcohol free.  His daughter reports that he has been having low blood pressure readings.  His current metoprolol half tablet twice a day.  This was decreased to once a day some days does not take it if his pressures too.  He is taking his flecainide.    ROS:  No fever or chills.  Good appetite.  No visual changes no auditory changes.  Patient does have chest wall pain secondary to CPR.  No nausea or vomiting.  No abdominal pain. No diarrhea.  No weakness in arms or legs.  Patient does report lot of anxiety.    PHYSICAL EXAM:  HEENT:  Patient appears to be in no acute distress.  Conjunctiva is clear.  He does have bilateral cataracts.  TMs show small ear canals.  Nasal septum is midline without discharge. Oropharynx is without erythema.  Trachea is midline without JVD.  Pulmonary:  Good inspiratory, expiratory inspiration.  No crackles wheezing or rhonchi.  Cardiovascular:  S1-S2, rhythm appears to be normal.  Extremities:  Without edema  Abdomen:  Nontender, nondistended, without hepatosplenomegaly.    Assessment:  1.  Status post metabolic encephalopathy secondary to alcohol  2.  AFib currently on flecainide  3.  Non-insulin diabetes  4.  Anxiety    Plan:  1.  I discussed with the daughter that do not want to start a medication for anxiety panic concerns he might take more than was  prescribed resulting in serotonin syndrome.  He already has referral to Psychiatry.  Will repeat order make sure the order is not .  2.  Will check a CMP, CBC and A1c check liver function, as well as his MCV and diabetes

## 2019-04-10 NOTE — TELEPHONE ENCOUNTER
----- Message from Cindy Tapia sent at 4/10/2019  9:48 AM CDT -----  Contact: Chani/daughter/[phone]  Type: Returning a call    Who left a message?  Ladonna    When did the practice call? Today    Comments:  Please advise, thank you

## 2019-04-12 ENCOUNTER — HOSPITAL ENCOUNTER (OUTPATIENT)
Dept: PSYCHIATRY | Facility: HOSPITAL | Age: 64
Discharge: HOME | End: 2019-04-12
Attending: PSYCHIATRY & NEUROLOGY
Payer: COMMERCIAL

## 2019-04-12 VITALS
WEIGHT: 180.38 LBS | DIASTOLIC BLOOD PRESSURE: 62 MMHG | BODY MASS INDEX: 28.99 KG/M2 | TEMPERATURE: 98 F | SYSTOLIC BLOOD PRESSURE: 115 MMHG | HEIGHT: 66 IN | HEART RATE: 80 BPM | RESPIRATION RATE: 16 BRPM

## 2019-04-12 DIAGNOSIS — F10.931 ALCOHOL WITHDRAWAL SYNDROME, WITH DELIRIUM: ICD-10-CM

## 2019-04-12 DIAGNOSIS — F10.20 ALCOHOL USE DISORDER, SEVERE, IN CONTROLLED ENVIRONMENT: ICD-10-CM

## 2019-04-12 DIAGNOSIS — F10.20 ALCOHOL USE DISORDER, SEVERE, DEPENDENCE: Primary | ICD-10-CM

## 2019-04-12 LAB
AMPHET+METHAMPHET UR QL: NEGATIVE
BARBITURATES UR QL SCN>200 NG/ML: NEGATIVE
BENZODIAZ UR QL SCN>200 NG/ML: NEGATIVE
BREATH ALCOHOL: 0
BZE UR QL SCN: NEGATIVE
CANNABINOIDS UR QL SCN: NEGATIVE
CREAT UR-MCNC: 31 MG/DL (ref 23–375)
ETHANOL UR-MCNC: <10 MG/DL
METHADONE UR QL SCN>300 NG/ML: NEGATIVE
OPIATES UR QL SCN: NEGATIVE
PCP UR QL SCN>25 NG/ML: NEGATIVE
TOXICOLOGY INFORMATION: NORMAL

## 2019-04-12 PROCEDURE — 80307 DRUG TEST PRSMV CHEM ANLYZR: CPT

## 2019-04-12 PROCEDURE — 90853 GROUP PSYCHOTHERAPY: CPT | Mod: ,,, | Performed by: PSYCHOLOGIST

## 2019-04-12 PROCEDURE — 90853 GROUP PSYCHOTHERAPY: CPT

## 2019-04-12 PROCEDURE — 90853 PR GROUP PSYCHOTHERAPY: ICD-10-PCS | Mod: ,,, | Performed by: PSYCHOLOGIST

## 2019-04-12 PROCEDURE — 90853 GROUP PSYCHOTHERAPY: CPT | Performed by: SOCIAL WORKER

## 2019-04-12 PROCEDURE — 99232 PR SUBSEQUENT HOSPITAL CARE,LEVL II: ICD-10-PCS | Mod: ,,, | Performed by: PSYCHIATRY & NEUROLOGY

## 2019-04-12 PROCEDURE — 99232 SBSQ HOSP IP/OBS MODERATE 35: CPT | Mod: ,,, | Performed by: PSYCHIATRY & NEUROLOGY

## 2019-04-12 NOTE — PLAN OF CARE
04/12/19 1300   Activity/Group Therapy Checklist   Group Goals/Reflection  (Values)   Attendance Attended   Follows Direction Followed directions   Group Interactions/Observations Interacted appropriately   Affect/Mood Range Normal range   Affect/Mood Display Appropriate   Goal Progression Progressing

## 2019-04-12 NOTE — H&P
"4/12/2019 8:43 AM  Donald Warren Abadie  1955  [id number]    [facility] Intensive Outpatient Program Admission History & Physical    STATUS:  Intensive Outpatient Program (IOP)    SUBJECTIVE:   Chief Complaint: substance abuse    History of Present Illness:   Donald Warren Abadie is a 63 y.o. male with a past psychiatric history of anxiety, unspecified , who presented to ABU IOP due to alcohol abuse.     Patient was recently seen by Psychiatry CL service after a recent hospital stay, per their note 3/27/19:  "As per the patient and the daughter, the patient has been drinking since he was 14 years old. Pt began drinking heavily after retiring five years ago and as per the daughter, it has worsened over the past year. During the past two months he has left the house only to get more alcohol and has not been participating in his normal activities. The patient says that he was drinking about 18 drinks per day. Attempted sobriety for the first time 8 weeks ago and experienced tremors and withdrawal symptoms (denies DTs) and resumed drinking. The patient states that he tried quitting alcohol 3.5 weeks ago because he was "feeling bad" and also accidentally overdosed on his BB and flecainide to feel better, which resulted in his [facility] admission. He endorsed sx of EtOH withdrawal on initial presentation, including anxiety, diaphoresis, hallucinations, and hallucinations.      The daughter believes that he is drinking more because he has nothing to occupy since retiring. She also thinks that his anxiety and depression have gotten worse and he uses drinking as a coping mechanism. The patient has had anxiety all his life, but it has worsened over the past few years. Anxiety is worse at night and reports poor sleep as a result. His depression has also worsened. He has tried Ativan in the past but was taken off of it because, as daughter states, he was addicted to it. He has also tried Effexor, Lexapro and Cymbalta. " "The daughter states that he was never on them long enough for them to work. She saw a difference with Cymbalta but thinks he stopped taking it because of the side effects. "    =================================    - Patient confirmed psych hx in above report. Was admitted to the hospital with extensive ICU stay from 3/15/19-3/29/19, was admitted following an accidental overdose of HTN medications, per family alcohol involved with the incident and patient showed signs of alcohol withdrawal when admitted.     Pleasant, linear throughout interview. Says only was drinking beer, confirms 18 beers per dayno liquor or wine. Says once he had no work obligations he started drinking all day (vs drinks after work). Drank 12 oz bud lights. Last drink was 3/15/19 after which patient was admitted to hospital (see above). Cites "dying twice" in the ICU as a wake up call - "I don't want to die!". Confirmed withdrawal symptoms previously reported when he tried to quit - says the first time stopped 2 weeks cold turkey (longest period sobriety since 14 y/o), second time was one week also with withdrawal symptoms. Says both times eventually the withdrawal symptoms went away but took about a week. Denies any seizures at that time, denies hx seizures. Says has been drinking daily since 14 y/o but denies any issues with work - "I never missed a day of work". Says after [facility] is when he started to acknowledge possibility of a problem "you better slow down". Denies alcohol related social problems. Cites medical problems and ICU stay as main wake-up calls.     Reports ongoing anxiety worse at night, says often affects his sleep. Wide ranging anxiety, says he can wake from sleep anxious and be unable to return to sleep. Started Remeron since discharge with planned increase to 15 mg soon. Feels it is helping with sleep "for the most part" and thinks he has some daytime benefits as well regarding anxiety. Denies mood issues. Relates to " "longterm, says when he was working he was "wore out" at home and didn't have trouble with generalized anxiety or insomnia. Denies any hx SI/HI. During aforementioned withdrawals denies any periods of disorientation or AVH, though collateral reports indicate likely hx DTs. Does acknowledge he does not remember much of ICU stay (chart review with possible AVH). Denies illicit drug use, denies hx addiction issues with illicits.    Denies recent mood issues. Reports sleep and anxiety has improved since starting Remeron. Otherwise pan negative on SIGECAPS since recent hospital discharge.     Substance Abuse History:  Substance of Choice: alcohol  Substances Used: no history of illicit drug use  History of IVDU?: No  Use of Alcohol: see above  Average Consumption: 18 12 oz bud lights per day (when still drinking)  Last Drink/Use: 3/15/19  Tobacco: No  History of Withdrawals: Yes - including apparent DT, no hx seizures  History of Detox: Yes - two times above at home and recent ICU stay, otherwise no hx detox   Rehab History: no  AA/NA: went to first AA meeting last Thursday in [place], sat in, enjoyed it and feels good about AA going forward  Medication Trials for Substance Abuse: No  Spouse/Partner Consumption: No  Patient Aware of Biomedical Complications: Yes    DSM-5 Substance Use Disorder Criteria:  1. Often take in larger amounts or over a longer period of time than was intended: Yes  2. Persistent desire or unsuccessful efforts to cut down or control use: Yes  3. Great deal of time spent in activities necessary to obtain substance, use, or recover from effects: Yes  4. Craving/strong desire for substance or urge to use: Yes  5. Use resulting in failure to fulfill major role obligations at home, work or school: No  6. Social, occupational, recreational activities decreased because of use: No  7. Continued use despite having persistent or recurrent social or interpersonal problems cause or exaserbated by the " substance: No  8. Recurrent use in situations in which it is physically hazardous: No  9. Use despite physical or psychological problems that are likely to have been caused or exacerbated by the substance: Yes  10. Tolerance, as defined by either of the following: Yes   A. A need for markedly increased amounts of substance to achieve intoxication or desired effect. -OR-    B. A markedly diminished effect with continued use of the same amount of substance.  11. Withdrawal, as manifested by the following: Yes   A. The characteristic withdrawal syndrome for substance. -AND-   B. Substance is taken to relieve or avoid withdrawal symptoms.  Mild (1-3), Moderate (4-5), Severe (?6)      Addictive Behaviors:  Sexual addiction: no  Gambling: no  Internet/Social Media/Video Games: no  Shopping: no  Binge eating/Bulimia/Eating Disorders: no  Other behavioral addiction: no    Psychiatric History:  Diagnose(s): anxiety - reports all meds below were for anxiety not depression provided by GP  Previous Medication Trials: ativan (reports was helpful), effexor, lexapro (couldn't stand it), cymbalta  Previous Psychiatric Hospitalizations: No  Previous Suicide Attempts: No  History of Violence: No  Outpatient Psychiatrist: thinks he has f/u from recent ICU stay but unsure     Social History:  Marital Status:   Children: 1 child and 2 grandchildren    Employment Status: retired, worked in past in construction for 42 years   Education: some college  Special Ed: denied   History: no  Housing Status: Yes - lives alone   Developmental History: unremarkable   History of Abuse: No  Access to Gun: Yes      Substance Abuse History:  Recreational Drugs: denies  Use of Alcohol: 18 beers per day until 3.5 weeks ago   Rehab History: No  Tobacco Use: no and no hx  Legal consequences of chemical use: Yes - DWI >30 years ago  Is the patient aware of the biomedical complications associated with substance abuse and mental illness?  Yes     Legal History:  Past Charges/Incarcerations: Yes - DWI 30 years ago  Pending Charges: no     Family Psychiatric History:   Yes - brother, alcohol use      Medical Review Of Systems:  R knee pain, getting knee surgery after this program. Denies major gait issues.  Otherwise negative      Allergies:  No known drug allergies  Medical/Surgical History:  Past Medical History:   Diagnosis Date    A-fib     Alcohol abuse     Arthritis     Chronic gout     Diabetes mellitus     DM (diabetes mellitus) 11/16/2016    Fatty liver     Hyperlipidemia     Renal cell cancer 2014     Past Surgical History:   Procedure Laterality Date    ABSCESS DRAINAGE      perirectal    COLONOSCOPY      FISTULOTOMY N/A 5/19/2015    Performed [facility] at [facility] OR [address] [place] [address]    HAND SURGERY Left     HEMORRHOIDECTOMY N/A 5/19/2015    Performed by Shane Hughes MD at [facility] OR [address] [place] [address]    KIDNEY SURGERY      partial left kidney removal - [place]    PARTIAL NEPHRECTOMY Left August 2014    ROBOTIC ASSISTED LAPAROSCOPIC NEPHRECTOMY PARTIAL Left 8/28/2014    Performed by Fabian Estevez MD at [facility] OR [address] [place] [address]     OBJECTIVE:     Current Medications:   Infusions:    Scheduled:    PRN:    Home Medications:  Prior to Admission medications    Medication Sig Start Date End Date Taking? Authorizing Provider   allopurinol (ZYLOPRIM) 100 MG tablet TAKE 2 TABLETS BY MOUTH DAILY 2/22/19   Diana Hemphill MD   apixaban (ELIQUIS) 5 mg Tab Take 1 tablet (5 mg total) by mouth 2 (two) times daily. 3/4/19   Uriel Tolentino MD   aspirin 81 MG Chew Take 1 tablet (81 mg total) by mouth once daily. 3/4/19 3/3/20  Uriel Tolentino MD   cyanocobalamin (VITAMIN B-12) 1000 MCG tablet Take 1 tablet (1,000 mcg total) by mouth once daily. 3/30/19   DEAN Vigil MD   fenofibrate 160 MG Tab Take 1 tablet (160 mg total) by mouth every evening. 10/29/18   Bacilio Larry MD   flecainide (TAMBOCOR) 150 MG Tab Take 1 tablet (150 mg  "total) by mouth 2 (two) times daily. 3/7/19 4/9/19  Uriel Tolentino MD   folic acid (FOLVITE) 1 MG tablet Take 1 tablet (1 mg total) by mouth once daily. 3/30/19 3/29/20  DEAN Vigil MD   metoprolol tartrate (LOPRESSOR) 25 MG tablet NOT CURRENTLY TAKING Take 0.5 tablets (12.5 mg total) by mouth 2 (two) times daily. 3/29/19 3/28/20  WJf Vigil MD   mirtazapine (REMERON) 15 MG tablet Take 1 tablet (15 mg total) by mouth every evening. 4/9/19 4/8/20  Bacilio Larry MD   multivitamin (THERAGRAN) tablet Take 1 tablet by mouth once daily. 3/30/19   DEAN Vigil MD   omega-3 acid ethyl esters (LOVAZA) 1 gram capsule Take 2 g by mouth 2 (two) times daily.    Historical Provider, MD   pantoprazole (PROTONIX) 40 MG tablet Take 1 tablet (40 mg total) by mouth once daily. 3/30/19 3/29/20  WJf Vigil MD   ranitidine (ZANTAC) 150 MG capsule Take 150 mg by mouth 2 (two) times daily.    Historical Provider, MD   rosuvastatin (CRESTOR) 10 MG tablet Take 1 tablet (10 mg total) by mouth once daily. 3/4/19 3/3/20  Uriel Tolentino MD   thiamine 100 MG tablet Take 1 tablet (100 mg total) by mouth once daily. 3/30/19   WJf Vigil MD     NOT CURRENTLY TAKING METOPROLOL    Vital Signs:  There were no vitals filed for this visit.  Physical Exam:      HEENT : no abnormalities noted  Neck : full ROM, no evident masses  Resp: normal WOB  MSK: mild limp 2/2 knee pain  Neuro : AOx4    Mental Status Exam:  Appearance: unremarkable, age appropriate, casually dressed, good grooming  Level of Consciousness: alert  Behavior/Cooperation: friendly and cooperative  Psychomotor: unremarkable   Speech: normal tone, normal rate, normal pitch, normal volume, accented  Language: english, fluid  Orientation: AOx4  Attention Span/Concentration: spelled "WORLD" forwards and backwards  Memory: 3/3 immediate recall, 2/3 short term recall and 3/3 with clues  Mood: "good"  Affect: normal  Thought Process: linear, normal and " logical  Associations: normal and logical  Thought Content: normal, no suicidality, no homicidality, delusions, or paranoia  Fund of Knowledge: Aware of current events , 3/3 presidents  Abstraction: proverbs were abstract - spilled milk  Insight: fair  Judgment: fair    Labs/Imaging/Studies:   No results found for this or any previous visit (from the past 24 hour(s)).   ASSESSMENT     Donald Warren Abadie is a 63 y.o. male with a past psychiatric history of anxiety, who presented to ABU IOP due to alcoho abuse. 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 Patient and family describe worsening alcohol intake leading to significant social isolation. Patient with history legal complications (DWI) from alcohol use. Patient very recently admitted for an extensive ICU stay after accidentally overdosing on HTN medications while intoxicated from alcohol use. At presentation to the ER patient was noted to have multiple alcohol withdrawal symptoms as well reports from family consistent with Delirium Tremens. Patient is also high risk for mood disorder if he were to relapse, with multiple static factors (, older white male with medical comorbidities and hx substance abuse) that would contribute to risk of completed suicide.    IMPRESSION  - alcohol use disorder, severe  - anxiety, unspecified    PLAN     · Start patient on ABU protocol.  · Breathalyzer daily and urine toxicology at least three times per week.  · VS daily x 3 days.  · CBC, CMP, GGT  · Patient counseled on abstinence from alcohol.    Medications: Continue current medications.  · Continue Remeron 15 mg PO nightly for insomnia and anxiety. Patient reports good effect with no reported side effects  · Discussed possibility of SSRI or other antidepressant for anxiety with patient at admit. He is open to the possibility but patient and MD agreed to revisit this option as he progresses in  program - at admit had recent increase in Remeron dosage (7.5 to 15 nightly) and patient feels he is sleeping well with anxiety lessened and restricted to night time only.   · Medications for alcohol use disorder were discussed including acamprosate, naltrexone and disulfiram.     Status: Continue treatment on ABU

## 2019-04-12 NOTE — PROGRESS NOTES
Group Psychotherapy (PhD/LCSW)    Site: [facility]    Clinical status of patient: Intensive Outpatient Program (IOP)    Date: 4/12/2019    Group Focus: Stress Management    Length of service: 77974 - 45-50 minutes    Number of patients in attendance: 8    Referred by: Add[facility] Behavior Unit Treatment Team    Target symptoms: Alcohol Abuse    Patient's response to treatment: Active Listening    Progress toward goals: Progressing adequately    Interval History: Group learned mindfulness techniques (breath, eating) to improve present-moment awareness, impulsive behavior tendencies, and tolerance of various emotional states.    Diagnosis: Alcohol Use Disorder    Plan: Continue treatment on ABU

## 2019-04-12 NOTE — PLAN OF CARE
04/12/19 1400   Activity/Group Therapy Checklist   Group Goals/Reflection   Attendance Attended   Follows Direction Followed directions   Group Interactions/Observations Interacted appropriately   Affect/Mood Range Normal range   Affect/Mood Display Appropriate   Goal Progression Progressing

## 2019-04-13 NOTE — PROGRESS NOTES
Group Psychotherapy (PhD/LCSW)    Site: [facility]    Clinical status of patient: Intensive Outpatient Program (IOP)    Date: 4/12/2019    Group Focus: Psychodynamic Group Therapy    Length of service: 11569 - 45-50 minutes    Number of patients in attendance: 4    Referred by: Addictive Behavior Unit Treatment Team    Target symptoms: Alcohol Abuse    Patient's response to treatment: Active Listening-; Self-disclosure    Progress toward goals: Progressing adequately    Interval History: First day on the ABU. Pt shared his story with the group.     Diagnosis: alcohol use disorder, severe, dependence,     Plan: Continue treatment on ABU

## 2019-04-14 LAB — ETHYL GLUCURONIDE: NEGATIVE

## 2019-04-15 ENCOUNTER — HOSPITAL ENCOUNTER (OUTPATIENT)
Dept: PSYCHIATRY | Facility: HOSPITAL | Age: 64
Discharge: HOME | End: 2019-04-15
Attending: PSYCHIATRY & NEUROLOGY
Payer: COMMERCIAL

## 2019-04-15 VITALS — RESPIRATION RATE: 16 BRPM | HEART RATE: 86 BPM | SYSTOLIC BLOOD PRESSURE: 140 MMHG | DIASTOLIC BLOOD PRESSURE: 81 MMHG

## 2019-04-15 DIAGNOSIS — F10.20 ALCOHOL USE DISORDER, SEVERE, DEPENDENCE: Primary | ICD-10-CM

## 2019-04-15 DIAGNOSIS — F10.931 ALCOHOL WITHDRAWAL SYNDROME, WITH DELIRIUM: ICD-10-CM

## 2019-04-15 DIAGNOSIS — F10.20 ALCOHOL USE DISORDER, SEVERE, IN CONTROLLED ENVIRONMENT: ICD-10-CM

## 2019-04-15 LAB
AMPHET+METHAMPHET UR QL: NEGATIVE
BARBITURATES UR QL SCN>200 NG/ML: NEGATIVE
BENZODIAZ UR QL SCN>200 NG/ML: NEGATIVE
BREATH ALCOHOL: NORMAL
BZE UR QL SCN: NEGATIVE
CANNABINOIDS UR QL SCN: NEGATIVE
CREAT UR-MCNC: 49 MG/DL (ref 23–375)
ETHANOL UR-MCNC: <10 MG/DL
METHADONE UR QL SCN>300 NG/ML: NEGATIVE
OPIATES UR QL SCN: NEGATIVE
PCP UR QL SCN>25 NG/ML: NEGATIVE
TOXICOLOGY INFORMATION: NORMAL

## 2019-04-15 PROCEDURE — 99222 1ST HOSP IP/OBS MODERATE 55: CPT | Mod: ,,, | Performed by: PSYCHIATRY & NEUROLOGY

## 2019-04-15 PROCEDURE — 80307 DRUG TEST PRSMV CHEM ANLYZR: CPT

## 2019-04-15 PROCEDURE — 90853 PR GROUP PSYCHOTHERAPY: ICD-10-PCS | Mod: ,,, | Performed by: PSYCHOLOGIST

## 2019-04-15 PROCEDURE — 90853 GROUP PSYCHOTHERAPY: CPT

## 2019-04-15 PROCEDURE — 90834 PSYTX W PT 45 MINUTES: CPT | Mod: 59,,, | Performed by: PSYCHOLOGIST

## 2019-04-15 PROCEDURE — 90834 PR PSYCHOTHERAPY W/PATIENT, 45 MIN: ICD-10-PCS | Mod: 59,,, | Performed by: PSYCHOLOGIST

## 2019-04-15 PROCEDURE — 99222 PR INITIAL HOSPITAL CARE,LEVL II: ICD-10-PCS | Mod: ,,, | Performed by: PSYCHIATRY & NEUROLOGY

## 2019-04-15 PROCEDURE — 90853 GROUP PSYCHOTHERAPY: CPT | Mod: ,,, | Performed by: PSYCHOLOGIST

## 2019-04-15 PROCEDURE — 90853 GROUP PSYCHOTHERAPY: CPT | Performed by: SOCIAL WORKER

## 2019-04-15 NOTE — PROGRESS NOTES
04/15/19 1400   Activity/Group Therapy Checklist   Group Relapse Prevention  (1st step)   Attendance Attended   Follows Direction Followed directions   Group Interactions/Observations Interacted appropriately;Alert;Sharing;Supportive  (pt engaged in group by asking the presenter questions and sharing a story related to his friend's drug use)   Affect/Mood Range Normal range   Affect/Mood Display Appropriate   Goal Progression Progressing

## 2019-04-15 NOTE — PROGRESS NOTES
Group Psychotherapy (PhD/LCSW)    Site: [facility]    Clinical status of patient: Intensive Outpatient Program (IOP)    Date: 4/15/2019    Group Focus:Communication Skills       Length of service: 27222 - 45-50 minutes    Number of patients in attendance: 6    Referred by: Addictive Behavior Unit Treatment Team    Target symptoms: Alcohol Abuse    Patient's response to treatment: Active Listening     Progress toward goals: Progressing adequately    Interval History: Discussed basic communication strategies (I-messages; Reflective listening) and illustrated their use and value.     Diagnosis: alcohol use disorder, severe, dependence,     Plan: Continue treatment on ABU

## 2019-04-15 NOTE — PROGRESS NOTES
Group Psychotherapy (PhD/LCSW)    Site: [facility]    Clinical status of patient: Intensive Outpatient Program (IOP)    Date: 4/15/2019    Group Focus: Psychodynamic Group Therapy    Length of service: 69819 - 45-50 minutes    Number of patients in attendance: 5    Referred by: Addictive Behavior Unit Treatment Team    Target symptoms: Alcohol Abuse    Patient's response to treatment: Active Listening-; Self-disclosure    Progress toward goals: Progressing adequately    Interval History: Discussed the way recovery is allowing him to be more patient. He noted that he had a good weekend staying busy helping his daughter and son-in-law with their projects.     Diagnosis: alcohol use disorder, severe, dependence,     Plan: Continue treatment on ABU

## 2019-04-15 NOTE — PROGRESS NOTES
"4/15/2019 8:43 AM  Donald Warren Abadie  1955  [id number]    [facility] Intensive Outpatient Program Progress Note    STATUS:  Intensive Outpatient Program (IOP)    SUBJECTIVE:   Chief Complaint: substance abuse    History of Present Illness:   Donald Warren Abadie is a 63 y.o. male with a past psychiatric history of anxiety, unspecified , who presented to ABU IOP due to alcohol abuse.     Patient was recently seen by Psychiatry CL service after a recent hospital stay, per their note 3/27/19:  "As per the patient and the daughter, the patient has been drinking since he was 14 years old. Pt began drinking heavily after retiring five years ago and as per the daughter, it has worsened over the past year. During the past two months he has left the house only to get more alcohol and has not been participating in his normal activities. The patient says that he was drinking about 18 drinks per day. Attempted sobriety for the first time 8 weeks ago and experienced tremors and withdrawal symptoms (denies DTs) and resumed drinking. The patient states that he tried quitting alcohol 3.5 weeks ago because he was "feeling bad" and also accidentally overdosed on his BB and flecainide to feel better, which resulted in his [facility] [place]. He endorsed sx of EtOH withdrawal on initial presentation, including anxiety, diaphoresis, hallucinations, and hallucinations.      The daughter believes that he is drinking more because he has nothing to occupy since retiring. She also thinks that his anxiety and depression have gotten worse and he uses drinking as a coping mechanism. The patient has had anxiety all his life, but it has worsened over the past few years. Anxiety is worse at night and reports poor sleep as a result. His depression has also worsened. He has tried Ativan in the past but was taken off of it because, as daughter states, he was addicted to it. He has also tried Effexor, Lexapro and Cymbalta. The daughter " "states that he was never on them long enough for them to work. She saw a difference with Cymbalta but thinks he stopped taking it because of the side effects. "    =================================    - Patient confirmed psych hx in above report. Was admitted to the hospital with extensive ICU stay from 3/15/19-3/29/19, was admitted following an accidental overdose of HTN medications, per family alcohol involved with the incident and patient showed signs of alcohol withdrawal when admitted.     Pleasant, linear throughout interview. Says only was drinking beer, confirms 18 beers per dayno liquor or wine. Says once he had no work obligations he started drinking all day (vs drinks after work). Drank 12 oz bud lights. Last drink was 3/15/19 after which patient was admitted to hospital (see above). Cites "dying twice" in the ICU as a wake up call - "I don't want to die!". Confirmed withdrawal symptoms previously reported when he tried to quit - says the first time stopped 2 weeks cold turkey (longest period sobriety since 14 y/o), second time was one week also with withdrawal symptoms. Says both times eventually the withdrawal symptoms went away but took about a week. Denies any seizures at that time, denies hx seizures. Says has been drinking daily since 14 y/o but denies any issues with work - "I never missed a day of work". Says after [employer] is when he started to acknowledge possibility of a problem "you better slow down". Denies alcohol related social problems. Cites medical problems and ICU stay as main wake-up calls.     Reports ongoing anxiety worse at night, says often affects his sleep. Wide ranging anxiety, says he can wake from sleep anxious and be unable to return to sleep. Started Remeron since discharge with planned increase to 15 mg soon. Feels it is helping with sleep "for the most part" and thinks he has some daytime benefits as well regarding anxiety. Denies mood issues. Relates to [employer], says " "when he was working he was "wore out" at home and didn't have trouble with generalized anxiety or insomnia. Denies any hx SI/HI. During aforementioned withdrawals denies any periods of disorientation or AVH, though collateral reports indicate likely hx DTs. Does acknowledge he does not remember much of ICU stay (chart review with possible AVH). Denies illicit drug use, denies hx addiction issues with illicits.    Denies recent mood issues. Reports sleep and anxiety has improved since starting Remeron. Otherwise pan negative on SIGECAPS since recent hospital discharge.     =====================    INTERVAL HX    4/15/19  Patient doing well today. Had a good weekend, worked on pool this weekend. Mood stable and good. Eating and sleeping well at home. Reports increased appetite with Remeron. Denies other side effects. Denies SI/HI/AVH. Afternoon AA meetings going well, patient attending daily. Feels he has been surprised he enjoys AA as much as he does. Good insight to benefits of daily attendance even after maintaining sobriety, discussed people at meetings that have been sober ten years. In process of finding a sponsor. Group here going well. Denies cravings. Says anxiety is improving "everything is getting better". Patient with no other questions for MD, and does not have any particular topics he would like to discuss when offered.     Toxicology Screen: Alcohol biomarkers 4/12 negative; Utox 4/12 negative; breath alcohol 4/12 negative   Medication Side Effects: None  Cravings: no  No other acute psychiatric issues reported at this time.      Allergies:  No known drug allergies  Medical/Surgical History:  Past Medical History:   Diagnosis Date    A-fib     Alcohol abuse     Arthritis     Chronic gout     Diabetes mellitus     DM (diabetes mellitus) 11/16/2016    Fatty liver     Hyperlipidemia     Renal cell cancer 2014     Past Surgical History:   Procedure Laterality Date    ABSCESS DRAINAGE      " perirectal    COLONOSCOPY      FISTULOTOMY N/A 5/19/2015    [facility]    HAND SURGERY Left     HEMORRHOIDECTOMY N/A 5/19/2015    [facility]    KIDNEY SURGERY      partial left kidney removal - [place]    PARTIAL NEPHRECTOMY Left August 2014    ROBOTIC ASSISTED LAPAROSCOPIC NEPHRECTOMY PARTIAL Left 8/28/2014    Performed by Fabian Estevez MD at [facility] OR [address] [place] [address]     OBJECTIVE:     Current Medications:   Infusions:    Scheduled:    PRN:    Home Medications:  Prior to Admission medications    Medication Sig Start Date End Date Taking? Authorizing Provider   allopurinol (ZYLOPRIM) 100 MG tablet TAKE 2 TABLETS BY MOUTH DAILY 2/22/19   Diana Hemphill MD   apixaban (ELIQUIS) 5 mg Tab Take 1 tablet (5 mg total) by mouth 2 (two) times daily. 3/4/19   Uriel Tolentino MD   aspirin 81 MG Chew Take 1 tablet (81 mg total) by mouth once daily. 3/4/19 3/3/20  Uriel Tolentino MD   cyanocobalamin (VITAMIN B-12) 1000 MCG tablet Take 1 tablet (1,000 mcg total) by mouth once daily. 3/30/19   DEAN Vigil MD   fenofibrate 160 MG Tab Take 1 tablet (160 mg total) by mouth every evening. 10/29/18   Bacilio Larry MD   flecainide (TAMBOCOR) 150 MG Tab Take 1 tablet (150 mg total) by mouth 2 (two) times daily. 3/7/19 4/9/19  Uriel Tolentino MD   folic acid (FOLVITE) 1 MG tablet Take 1 tablet (1 mg total) by mouth once daily. 3/30/19 3/29/20  DEAN Vigil MD   metoprolol tartrate (LOPRESSOR) 25 MG tablet NOT CURRENTLY TAKING Take 0.5 tablets (12.5 mg total) by mouth 2 (two) times daily. 3/29/19 3/28/20  DEAN Vigil MD   mirtazapine (REMERON) 15 MG tablet Take 1 tablet (15 mg total) by mouth every evening. 4/9/19 4/8/20  Bacilio Larry MD   multivitamin (THERAGRAN) tablet Take 1 tablet by mouth once daily. 3/30/19   DEAN Vigil MD   omega-3 acid ethyl esters (LOVAZA) 1 gram capsule Take 2 g by mouth 2 (two) times  "daily.    Historical Provider, MD   pantoprazole (PROTONIX) 40 MG tablet Take 1 tablet (40 mg total) by mouth once daily. 3/30/19 3/29/20  DEAN Vigil MD   ranitidine (ZANTAC) 150 MG capsule Take 150 mg by mouth 2 (two) times daily.    Historical Provider, MD   rosuvastatin (CRESTOR) 10 MG tablet Take 1 tablet (10 mg total) by mouth once daily. 3/4/19 3/3/20  Uriel Tolentino MD   thiamine 100 MG tablet Take 1 tablet (100 mg total) by mouth once daily. 3/30/19   DEAN Vigil MD     NOT CURRENTLY TAKING METOPROLOL    Vital Signs:  There were no vitals filed for this visit.  Physical Exam:      HEENT : no abnormalities noted  Neck : full ROM, no evident masses  Resp: normal WOB  MSK: mild limp 2/2 knee pain  Neuro : AOx4    Mental Status Exam:  Appearance: unremarkable, age appropriate, casually dressed, good grooming  Level of Consciousness: alert  Behavior/Cooperation: friendly and cooperative  Psychomotor: unremarkable   Speech: normal tone, normal rate, normal pitch, normal volume, accented  Language: english, fluid  Orientation: grossly oriented  Attention Span/Concentration: intact  Memory: grossly intact  Mood: "good"  Affect: normal  Thought Process: linear, normal and logical  Associations: normal and logical  Thought Content: normal, no suicidality, no homicidality, delusions, or paranoia  Fund of Knowledge: Aware of current events   Abstraction: intact to conversation  Insight: fair  Judgment: fair    Labs/Imaging/Studies:   No results found for this or any previous visit (from the past 24 hour(s)).   ASSESSMENT     Donald Warren Abadie is a 63 y.o. male with a past psychiatric history of anxiety, who presented to [facility] due to alcoho abuse. 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 Patient and family describe worsening alcohol intake leading to significant social isolation. Patient with history legal complications (DWI) " from alcohol use. Patient very recently admitted for an extensive ICU stay after accidentally overdosing on HTN medications while intoxicated from alcohol use. At presentation to the ER patient was noted to have multiple alcohol withdrawal symptoms as well reports from family consistent with Delirium Tremens. Patient is also high risk for mood disorder if he were to relapse, with multiple static factors (, older white male with medical comorbidities and hx substance abuse) that would contribute to risk of completed suicide.    IMPRESSION  - alcohol use disorder, severe  - anxiety, unspecified    PLAN     · Start patient on ABU protocol.  · Breathalyzer daily and urine toxicology at least three times per week.  · VS daily x 3 days.  · CBC, CMP, GGT  · Patient counseled on abstinence from alcohol.    Medications: Continue current medications.  · Continue Remeron 15 mg PO nightly for insomnia and anxiety. Patient reports good effect with no reported side effects  · Discussed possibility of SSRI or other antidepressant for anxiety with patient at admit. He is open to the possibility but patient and MD agreed to revisit this option as he progresses in program - at admit had recent  increase in Remeron dosage (7.5 to 15 nightly) and patient feels he is sleeping well with anxiety lessened and restricted to night time only.   · Medications for alcohol use disorder were discussed including acamprosate, naltrexone and disulfiram.     Status: Continue treatment on ABU

## 2019-04-15 NOTE — PLAN OF CARE
04/15/19 1000   Activity/Group Therapy Checklist   Group Educational  (healthy anger )   Attendance Attended   Follows Direction Followed directions   Group Interactions/Observations Interacted appropriately   Affect/Mood Range Normal range   Affect/Mood Display Appropriate   Goal Progression Progressing

## 2019-04-15 NOTE — PROGRESS NOTES
"[facility]  Psychology  Progress Note  Individual Psychotherapy (PhD/LCSW)    Patient Name: Donald Warren Abadie  MRN: [id number]    Patient Class: OP- Behavioral Recurring   Admission Date: 4/15/2019  Hospital Length of Stay: 0 days  Attending Physician: Caroline Elaine MD  Primary Care Provider: Bacilio Larry MD    Therapeutic Intervention: Met with patient.  Intensive Outpatient Program - Insight oriented psychotherapy 45 min - CPT code 44324    Chief Complaint/Reason for Encounter: addictive disorder and anxiety     Interval History and Content of Current Session: Pt referred by [facility] staff for individual psychotherapy session. 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 He noted that anxiety in the evening also became a problem at that time. He denied excessive worry but he said that his mind wouldn't "shut off" when he tried to go to sleep. At present the Remeron he is taking is helping but he is not sure it is effective enough. Pt says he has a very good relationship with his daughter and her fx. He raised his daughter after he and his wife  (after one year of marriage). Pt has a very active outdoor lifestyle and enjoys staying busy. He denies any urges to drink at present and describes himself as basically happy being sober. He implies that he is doesn't expect that it will be hard to remain abstinent. We discussed the way that this expectation could ultimately undermine his commitment to be actively engaged in AA and which, in turn, could be a precursor to relapse down the line should he become over-confident and believe that he can control his use of etoh. He was able to see the analogy to his problems with gout, which was a big problem for him. He recognizes now that he must take his gout medicine (Allopurinol) on a daily basis in order to manage his gout, but he also sees that it won't " cure the gout. He was able to equate AA as a medicine for his alcoholism just as Allopurinol is a medicine for his gout as both her chronic conditions.     Risk Parameters:  Patient reports no suicidal ideation  Patient reports no homicidal ideation  Patient reports no self-injurious behavior  Patient reports no violent behavior    Verbal Deficits: None    Patient's response to intervention:  The patient's response to intervention is accepting.    Progress toward goals and other mental status changes:  The patient's progress toward goals is good.    Diagnostic Impression - Plan:     Alcohol Use D/O, severe dependence     Treatment Plan:  · Target symptoms: alcohol abuse  · Why chosen therapy is appropriate versus another modality: relevant to diagnosis, patient responds to this modality  · Outcome monitoring methods: self-report, observation  · Therapeutic intervention type: insight oriented psychotherapy    Plan:  ABU    Return to Clinic: as scheduled    Length of Service (minutes): 45    Jameel Tyler, PhD  Psychology  [facility]

## 2019-04-16 ENCOUNTER — HOSPITAL ENCOUNTER (OUTPATIENT)
Dept: PSYCHIATRY | Facility: HOSPITAL | Age: 64
Discharge: HOME | End: 2019-04-16
Attending: PSYCHIATRY & NEUROLOGY
Payer: COMMERCIAL

## 2019-04-16 VITALS — DIASTOLIC BLOOD PRESSURE: 67 MMHG | SYSTOLIC BLOOD PRESSURE: 133 MMHG | RESPIRATION RATE: 16 BRPM | HEART RATE: 83 BPM

## 2019-04-16 DIAGNOSIS — F10.20 ALCOHOL USE DISORDER, SEVERE, DEPENDENCE: Primary | ICD-10-CM

## 2019-04-16 DIAGNOSIS — F10.931 ALCOHOL WITHDRAWAL SYNDROME, WITH DELIRIUM: ICD-10-CM

## 2019-04-16 DIAGNOSIS — F10.20 ALCOHOL USE DISORDER, SEVERE, IN CONTROLLED ENVIRONMENT: ICD-10-CM

## 2019-04-16 LAB — BREATH ALCOHOL: 0

## 2019-04-16 PROCEDURE — 90853 PR GROUP PSYCHOTHERAPY: ICD-10-PCS | Mod: ,,, | Performed by: PSYCHOLOGIST

## 2019-04-16 PROCEDURE — 90853 GROUP PSYCHOTHERAPY: CPT | Mod: ,,, | Performed by: PSYCHOLOGIST

## 2019-04-16 PROCEDURE — 90853 GROUP PSYCHOTHERAPY: CPT | Performed by: SOCIAL WORKER

## 2019-04-16 PROCEDURE — 90853 GROUP PSYCHOTHERAPY: CPT

## 2019-04-16 RX ORDER — NALTREXONE HYDROCHLORIDE 50 MG/1
50 TABLET, FILM COATED ORAL DAILY
Qty: 30 TABLET | Refills: 0 | Status: SHIPPED | OUTPATIENT
Start: 2019-04-16 | End: 2019-05-14 | Stop reason: SDUPTHER

## 2019-04-16 NOTE — PROGRESS NOTES
Group Psychotherapy (PhD/LCSW)    Site: [facility]    Clinical status of patient: Intensive Outpatient Program (IOP)    Date: 4/16/2019    Group Focus: Psychodynamic Group Psychotherapy    Length of service: 38606 - 45-50 minutes    Number of patients in attendance: 5    Referred by: Addictive Behavior Unit Treatment Team    Target symptoms: Alcohol Abuse    Patient's response to treatment: Active Listening and Self-disclosure    Progress toward goals: Progressing adequately    Interval History: Discussed dying twice after he took too much blood pressure medication while intoxicated.    Diagnosis: alcohol use disorder, severe, dependence    Plan: Continue treatment on ABU

## 2019-04-16 NOTE — PLAN OF CARE
04/16/19 1400   Activity/Group Therapy Checklist   Group Addiction Education   Attendance Attended   Follows Direction Followed directions   Group Interactions/Observations Interacted appropriately   Affect/Mood Range Normal range   Affect/Mood Display Appropriate   Goal Progression Progressing

## 2019-04-16 NOTE — PROGRESS NOTES
Group Psychotherapy (PhD/LCSW)    Site: [facility]    Clinical status of patient: Intensive Outpatient Program (IOP)    Date: 4/16/2019    Group Focus: Disease Model of Addiction      Length of service: 46950 - 45-50 minutes    Number of patients in attendance: 5    Referred by: Addictive Behavior Unit Treatment Team    Target symptoms: Alcohol Abuse    Patient's response to treatment: Active Listening-; Self-disclosure    Progress toward goals: Progressing adequately    Interval History:   Discussed the basic neuropsychological concepts of the Disease Model of Addiction and their relationship to the phenomena of addiction (eg, powerlessness; euphoric recall; cravings; relapse; etc). Noted the value of understanding the disease model of addiction for sustaining long-term sobriety.     Diagnosis: alcohol use disorder, severe, dependence,     Plan: Continue treatment on ABU

## 2019-04-16 NOTE — PROGRESS NOTES
Group Psychotherapy (PhD/LCSW)    Site: [facility]    Clinical status of patient: Intensive Outpatient Program (IOP)    Date: 4/16/2019    Group Focus: DBT-Based Group Therapy    Length of service: 28690 - 45-50 minutes    Number of patients in attendance: 7    Referred by: Addictive Behavior Unit Treatment Team    Target symptoms: Alcohol Abuse    Patient's response to treatment: Active Listening-; Self-disclosure    Progress toward goals: Progressing adequately    Interval History: Session focus was Emotion Regulation:  Understanding Emotions.  Patients were encouraged to understand what their emotions do for them (motivate them to action, communicate to themselves and others).      Diagnosis: alcohol use disorder, severe, dependence,     Plan: Continue treatment on ABU

## 2019-04-17 ENCOUNTER — HOSPITAL ENCOUNTER (OUTPATIENT)
Dept: PSYCHIATRY | Facility: HOSPITAL | Age: 64
Discharge: HOME | End: 2019-04-17
Attending: PSYCHIATRY & NEUROLOGY
Payer: COMMERCIAL

## 2019-04-17 VITALS — DIASTOLIC BLOOD PRESSURE: 73 MMHG | SYSTOLIC BLOOD PRESSURE: 138 MMHG | RESPIRATION RATE: 16 BRPM | HEART RATE: 84 BPM

## 2019-04-17 DIAGNOSIS — F10.20 ALCOHOL USE DISORDER, SEVERE, DEPENDENCE: Primary | ICD-10-CM

## 2019-04-17 DIAGNOSIS — F10.931 ALCOHOL WITHDRAWAL SYNDROME, WITH DELIRIUM: ICD-10-CM

## 2019-04-17 DIAGNOSIS — F10.20 ALCOHOL USE DISORDER, SEVERE, IN CONTROLLED ENVIRONMENT: ICD-10-CM

## 2019-04-17 LAB
AMPHET+METHAMPHET UR QL: NEGATIVE
BARBITURATES UR QL SCN>200 NG/ML: NEGATIVE
BENZODIAZ UR QL SCN>200 NG/ML: NEGATIVE
BREATH ALCOHOL: 0
BZE UR QL SCN: NEGATIVE
CANNABINOIDS UR QL SCN: NEGATIVE
CREAT UR-MCNC: 21 MG/DL (ref 23–375)
ETHANOL UR-MCNC: <10 MG/DL
METHADONE UR QL SCN>300 NG/ML: NEGATIVE
OPIATES UR QL SCN: NEGATIVE
PCP UR QL SCN>25 NG/ML: NEGATIVE
TOXICOLOGY INFORMATION: ABNORMAL

## 2019-04-17 PROCEDURE — 90853 GROUP PSYCHOTHERAPY: CPT

## 2019-04-17 PROCEDURE — 90853 GROUP PSYCHOTHERAPY: CPT | Mod: ,,, | Performed by: PSYCHOLOGIST

## 2019-04-17 PROCEDURE — 80307 DRUG TEST PRSMV CHEM ANLYZR: CPT

## 2019-04-17 PROCEDURE — 90853 GROUP PSYCHOTHERAPY: CPT | Performed by: SOCIAL WORKER

## 2019-04-17 PROCEDURE — 99232 SBSQ HOSP IP/OBS MODERATE 35: CPT | Mod: ,,, | Performed by: PSYCHIATRY & NEUROLOGY

## 2019-04-17 PROCEDURE — 90853 PR GROUP PSYCHOTHERAPY: ICD-10-PCS | Mod: ,,, | Performed by: PSYCHOLOGIST

## 2019-04-17 PROCEDURE — 90853 PR GROUP PSYCHOTHERAPY: ICD-10-PCS | Mod: ,,, | Performed by: PSYCHIATRY & NEUROLOGY

## 2019-04-17 PROCEDURE — 90853 GROUP PSYCHOTHERAPY: CPT | Mod: ,,, | Performed by: PSYCHIATRY & NEUROLOGY

## 2019-04-17 PROCEDURE — 99232 PR SUBSEQUENT HOSPITAL CARE,LEVL II: ICD-10-PCS | Mod: ,,, | Performed by: PSYCHIATRY & NEUROLOGY

## 2019-04-17 NOTE — PROGRESS NOTES
Group Psychotherapy (PhD/LCSW)    Site: [facility]    Clinical status of patient: Intensive Outpatient Program (IOP)    Date: 4/17/2019    Group Focus: DBT-Based Group Therapy    Length of service: 40222 - 45-50 minutes    Number of patients in attendance: 8    Referred by: Addictive Behavior Unit Treatment Team    Target symptoms: Alcohol Abuse    Patient's response to treatment: Active Listening-; Self-disclosure    Progress toward goals: Progressing adequately    Interval History: Session focus was Emotion Regulation:  Opposite Action.  Patients identified action urges for each emotion and learned how to engage in opposite action when the emotions are not justified or unhelpful.     Diagnosis: alcohol use disorder, severe, dependence,     Plan: Continue treatment on ABU

## 2019-04-17 NOTE — PROGRESS NOTES
"4/17/2019 8:43 AM  Donald Warren Abadie  1955  [id number]    [facility] Intensive Outpatient Program Progress Note    STATUS:  Intensive Outpatient Program (IOP)    SUBJECTIVE:   Chief Complaint: substance abuse    History of Present Illness:   Donald Warren Abadie is a 63 y.o. male with a past psychiatric history of anxiety, unspecified , who presented to ABU IOP due to alcohol abuse.     Patient was recently seen by Psychiatry CL service after a recent hospital stay, per their note 3/27/19:  "As per the patient and the daughter, the patient has been drinking since he was 14 years old. Pt began drinking heavily after retiring five years ago and as per the daughter, it has worsened over the past year. During the past two months he has left the house only to get more alcohol and has not been participating in his normal activities. The patient says that he was drinking about 18 drinks per day. Attempted sobriety for the first time 8 weeks ago and experienced tremors and withdrawal symptoms (denies DTs) and resumed drinking. The patient states that he tried quitting alcohol 3.5 weeks ago because he was "feeling bad" and also accidentally overdosed on his BB and flecainide to feel better, which resulted in [place] [facility] [place] He endorsed sx of EtOH withdrawal on initial presentation, including anxiety, diaphoresis, hallucinations, and hallucinations.      The daughter believes that he is drinking more because he has nothing to occupy since retiring. She also thinks that his anxiety and depression have gotten worse and he uses drinking as a coping mechanism. The patient has had anxiety all his life, but it has worsened over the past few years. Anxiety is worse at night and reports poor sleep as a result. His depression has also worsened. He has tried Ativan in the past but was taken off of it because, as daughter states, he was addicted to it. He has also tried Effexor, Lexapro and Cymbalta. The daughter " "states that he was never on them long enough for them to work. She saw a difference with Cymbalta but thinks he stopped taking it because of the side effects. "    =================================    - Patient confirmed psych hx in above report. Was admitted to the hospital with extensive ICU stay from 3/15/19-3/29/19, was admitted following an accidental overdose of HTN medications, per family alcohol involved with the incident and patient showed signs of alcohol withdrawal when admitted.     Pleasant, linear throughout interview. Says only was drinking beer, confirms 18 beers per dayno liquor or wine. Says once he had no work obligations he started drinking all day (vs drinks after work). Drank 12 oz bud lights. Last drink was 3/15/19 after which patient was admitted to hospital (see above). Cites "dying twice" in the ICU as a wake up call - "I don't want to die!". Confirmed withdrawal symptoms previously reported when he tried to quit - says the first time stopped 2 weeks cold turkey (longest period sobriety since 14 y/o), second time was one week also with withdrawal symptoms. Says both times eventually the withdrawal symptoms went away but took about a week. Denies any seizures at that time, denies hx seizures. Says has been drinking daily since 14 y/o but denies any issues with work - "I never missed a day of work". Says after custodial is when he started to acknowledge possibility of a problem "you better slow down". Denies alcohol related social problems. Cites medical problems and ICU stay as main wake-up calls.     Reports ongoing anxiety worse at night, says often affects his sleep. Wide ranging anxiety, says he can wake from sleep anxious and be unable to return to sleep. Started Remeron since discharge with planned increase to 15 mg soon. Feels it is helping with sleep "for the most part" and thinks he has some daytime benefits as well regarding anxiety. Denies mood issues. Relates to custodial, says " "when he was working he was "wore out" at home and didn't have trouble with generalized anxiety or insomnia. Denies any hx SI/HI. During aforementioned withdrawals denies any periods of disorientation or AVH, though collateral reports indicate likely hx DTs. Does acknowledge he does not remember much of ICU stay (chart review with possible AVH). Denies illicit drug use, denies hx addiction issues with illicits.    Denies recent mood issues. Reports sleep and anxiety has improved since starting Remeron. Otherwise pan negative on SIGECAPS since recent hospital discharge.     =====================    INTERVAL HX    4/15/19  Patient doing well today. Had a good weekend, worked on pool this weekend. Mood stable and good. Eating and sleeping well at home. Reports increased appetite with Remeron. Denies other side effects. Denies SI/HI/AVH. Afternoon AA meetings going well, patient attending daily. Feels he has been surprised he enjoys AA as much as he does. Good insight to benefits of daily attendance even after maintaining sobriety, discussed people at meetings that have been sober ten years. In process of finding a sponsor. Group here going well. Denies cravings. Says anxiety is improving "everything is getting better". Patient with no other questions for MD, and does not have any particular topics he would like to discuss when offered.     Toxicology Screen: Alcohol biomarkers 4/12 negative; Utox 4/12 negative; breath alcohol 4/12 negative   Medication Side Effects: None  Cravings: no  No other acute psychiatric issues reported at this time.    4/17/19  Patient doing well. Reports mood doing good and stable. Says son-in-law came in for family today, explained for privacy reasons son-in-law unable to attend group meetings with other patients, patient voiced understanding. Says main result of family day is patient would like to address his hurt knee sooner rather than later. Picked up rx, plans to trial naltrexone at home " this weekend in case he gets nauseous. AA meetings going well, group here going well. Denies SI/HI/AVH. Plans to address knee as soon as done with program. Denies cravings , does report eating more. Sleeping well at home. Reports night time anxiety continues to improve. Denies medication side effects outside of possible increased appetite. Patient with no other questions for MD, and does not have any particular topics he/she would like to discuss when offered.      Toxicology Screen: Alcohol biomarkers 4/12 negative, 4/15 in process; Utox 4/15 negative; breath alcohol 4/16 negative   Medication Side Effects: increased appetite, though likely also due to sobriety and decreased etoh calories  Cravings: no  No other acute psychiatric issues reported at this time.    Allergies:  No known drug allergies  Medical/Surgical History:  Past Medical History:   Diagnosis Date    A-fib     Alcohol abuse     Arthritis     Chronic gout     Diabetes mellitus     DM (diabetes mellitus) 11/16/2016    Fatty liver     Hyperlipidemia     Renal cell cancer 2014     Past Surgical History:   Procedure Laterality Date    ABSCESS DRAINAGE      perirectal    COLONOSCOPY      FISTULOTOMY N/A 5/19/2015    Performed by Shane Hughes MD at [facility] OR [address] [place] [address]    HAND SURGERY Left     HEMORRHOIDECTOMY N/A 5/19/2015    Performed by Shane Hughes MD at [facility] OR [address] [place] [address]    KIDNEY SURGERY      partial left kidney removal - [place]    PARTIAL NEPHRECTOMY Left August 2014    ROBOTIC ASSISTED LAPAROSCOPIC NEPHRECTOMY PARTIAL Left 8/28/2014    Performed by Fabian Estevez MD at [facility] OR [address] [place] [address]     OBJECTIVE:     Current Medications:   Infusions:    Scheduled:    PRN:    Home Medications:  Prior to Admission medications    Medication Sig Start Date End Date Taking? Authorizing Provider   allopurinol (ZYLOPRIM) 100 MG tablet TAKE 2 TABLETS BY MOUTH DAILY 2/22/19   Diana Hemphill MD   apixaban (ELIQUIS) 5 mg Tab Take 1 tablet  (5 mg total) by mouth 2 (two) times daily. 3/4/19   Uriel Tolentino MD   aspirin 81 MG Chew Take 1 tablet (81 mg total) by mouth once daily. 3/4/19 3/3/20  Uriel Tolentino MD   cyanocobalamin (VITAMIN B-12) 1000 MCG tablet Take 1 tablet (1,000 mcg total) by mouth once daily. 3/30/19   W. Daljit Vigil MD   fenofibrate 160 MG Tab Take 1 tablet (160 mg total) by mouth every evening. 10/29/18   Bacilio Larry MD   flecainide (TAMBOCOR) 150 MG Tab Take 1 tablet (150 mg total) by mouth 2 (two) times daily. 3/7/19 4/9/19  Uriel Tolentino MD   folic acid (FOLVITE) 1 MG tablet Take 1 tablet (1 mg total) by mouth once daily. 3/30/19 3/29/20  W. Daljit Vigil MD   metoprolol tartrate (LOPRESSOR) 25 MG tablet NOT CURRENTLY TAKING Take 0.5 tablets (12.5 mg total) by mouth 2 (two) times daily. 3/29/19 3/28/20  W. Daljit Vigil MD   mirtazapine (REMERON) 15 MG tablet Take 1 tablet (15 mg total) by mouth every evening. 4/9/19 4/8/20  Bacilio Larry MD   multivitamin (THERAGRAN) tablet Take 1 tablet by mouth once daily. 3/30/19   W. Daljit Vigil MD   omega-3 acid ethyl esters (LOVAZA) 1 gram capsule Take 2 g by mouth 2 (two) times daily.    Historical Provider, MD   pantoprazole (PROTONIX) 40 MG tablet Take 1 tablet (40 mg total) by mouth once daily. 3/30/19 3/29/20  W. Daljit Vigil MD   ranitidine (ZANTAC) 150 MG capsule Take 150 mg by mouth 2 (two) times daily.    Historical Provider, MD   rosuvastatin (CRESTOR) 10 MG tablet Take 1 tablet (10 mg total) by mouth once daily. 3/4/19 3/3/20  Uriel Tolentino MD   thiamine 100 MG tablet Take 1 tablet (100 mg total) by mouth once daily. 3/30/19   DEAN Vigil MD     NOT CURRENTLY TAKING METOPROLOL    Vital Signs:  There were no vitals filed for this visit.  Physical Exam:      HEENT : no abnormalities noted  Neck : full ROM, no evident masses  Resp: normal WOB  MSK: mild limp 2/2 knee pain  Neuro : AOx4    Mental Status Exam:  Appearance: unremarkable, age  "appropriate, casually dressed, good grooming  Level of Consciousness: alert  Behavior/Cooperation: friendly and cooperative  Psychomotor: unremarkable   Speech: normal tone, normal rate, normal pitch, normal volume, accented  Language: english, fluid  Orientation: grossly oriented  Attention Span/Concentration: intact  Memory: grossly intact  Mood: "real good"  Affect: normal  Thought Process: linear, normal and logical  Associations: normal and logical  Thought Content: normal, no suicidality, no homicidality, delusions, or paranoia  Fund of Knowledge: Aware of current events   Abstraction: intact to conversation  Insight: fair  Judgment: fair    Labs/Imaging/Studies:   Recent Results (from the past 24 hour(s))   POCT BREATH ALCOHOL TEST    Collection Time: 04/16/19 11:22 AM   Result Value Ref Range    Breath Alcohol 0.000       ASSESSMENT     Donald Warren Abadie is a 63 y.o. male with a past psychiatric history of anxiety, who presented to ABU IOP due to alcoho abuse. 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 Patient and family describe worsening alcohol intake leading to significant social isolation. Patient with history legal complications (DWI) from alcohol use. Patient very recently admitted for an extensive ICU stay after accidentally overdosing on HTN medications while intoxicated from alcohol use. At presentation to the ER patient was noted to have multiple alcohol withdrawal symptoms as well reports from family consistent with Delirium Tremens. Patient is also high risk for mood disorder if he were to relapse, with multiple static factors (, older white male with medical comorbidities and hx substance abuse) that would contribute to risk of completed suicide.    IMPRESSION  - alcohol use disorder, severe  - anxiety, unspecified    PLAN     · Start patient on ABU protocol.  · Breathalyzer daily and urine toxicology at least " three times per week.  · VS daily x 3 days.  · CBC, CMP, GGT  · Patient counseled on abstinence from alcohol.    Medications: Continue current medications.  · Continue Remeron 15 mg PO nightly for insomnia and anxiety. Patient reports good effect with no reported troublesome side effects.  · Discussed possibility of SSRI or other antidepressant for anxiety with patient at admit. He is open to the possibility but patient and MD agreed to revisit this option as he progresses in program - at admit had recent  increase in Remeron dosage (7.5 to 15 nightly) and patient feels he is sleeping well with anxiety lessened and restricted to night time only.   · Patient to trial naltrexone over the weekend at starting dose 25 mg PO in anticipation of taking regularly in the future (especially in context of likely knee surgery after this program), will monitor for side effects after the weekend.  · Medications for alcohol use disorder were discussed including acamprosate, naltrexone and disulfiram.     Status: Continue treatment on ABU

## 2019-04-17 NOTE — PATIENT CARE CONFERENCE
[facility]U Staffing:   Alcohol use disorder, severe  Anxiety, unspecified        1. Pt is attending all groups    2. Pt is attending all meetings  3. Pt 's has minimally supportive family  4. Pt has completed spiritual assessment    5. Pt will present life story    6. Pt will present Step One assignment    7. Pt is exploring issues related to relapse  prevention; spirituality; stress management; improved communication skills; assertiveness training; poor self-esteem; disease concepts; cross addictions; and, work related issues    8. D/C date: TBD     Staff discussed pt's hx related to anxiety and severe alcohol use. Staffing discussed pt appearing concrete in group, gaining insight into triggers for increased ETOH intake, starting naltrexone as pt is a high risk for relapse, elevated liver enzymes, and pre contemplative behavior.        Problem: Alcohol use disorder, severe  Goal: Address in 12 step meetings and group and individual sessions    Objective Measure: participation in groups, self report, length of sobriety, and relapse prevention plan  Time: Prior to discharge    Progress: Pt is attending groups and sessions       Problem: Anxiety, unspecified   Goal: Address in 12 step meetings and group and individual sessions    Objective Measure: participation in groups, self report, length of sobriety, and relapse prevention plan  Time: Prior to discharge    Progress: Pt is attending groups and sessions       Staff members present:   MD Dr. Malick Cross, Resident  Dr. Chavez, PhD  Patricia Taylor, hospitalsW  Chano Cortez, hospitalsW

## 2019-04-17 NOTE — PROGRESS NOTES
Group Psychotherapy (PhD/LCSW)    Site: [facility]    Clinical status of patient: Intensive Outpatient Program (IOP)    Date: 4/17/2019    Group Focus: Psychodynamic Group Psychotherapy    Length of service: 09720 - 45-50 minutes    Number of patients in attendance: 3    Referred by: Addictive Behavior Unit Treatment Team    Target symptoms: Alcohol Abuse    Patient's response to treatment: Active Listening and Self-disclosure    Progress toward goals: Progressing adequately    Interval History: Discussed alcohol affecting his liver.    Diagnosis: alcohol use disorder, severe, dependence    Plan: Continue treatment on ABU

## 2019-04-17 NOTE — TREATMENT PLAN
[facility]  ADDICTIVE BEHAVIOR UNIT  INTERDISCIPLINARY TREATMENT PLAN  INTENSIVE OUTPATIENT PROGRAM    INTERDISCIPLINARY  TREATMENT TEAM:    Caroline Elaine M.D., Psychiatrist     Kimi Chavez, Ph.D., Clinical Psychologist    Crystal Welch R.N., Registered Nurse    Yonathan Guidry, [facility],     Patricia Taylor, [facility],     Chano Cortez, [facility],     Resident: Dr. Teresa       Signatures scanned into record separately.      ESTIMATED LOS:  4-6 weeks        The patient has reviewed the treatment plan with staff and has signed the Patient Responsibilities form.  Patient signature scanned into record separately        Dr. Raghav Weaver certifies that the patient would require inpatient psychiatric care if the Partial Hospitalization services were not provided, and services will be furnished under the care of a physician, and under a written Plan of Treatment.    Raghav Weaver M.D., Psychiatrist - Signature scanned into record separately.    TREATMENT PLAN    DIAGNOSIS: Alcohol Use Disorder, severe; Anxiety       Patient/Family Education Needs/Barriers to Learning (i.e., Language, Reading, Comprehension): None       Support/Advocacy Services/Needs (i.e., Financial, Transportation, Medications): None       Community Resources (i.e., Alcoholics Anonymous, Al Anon, Cocaine Anonymous, Narcotics Anonymous): None           Strengths:  1. Good support system   2. Hard working   3. Hobbies and interests   4. Verbalizes willingness for treatment         Limitations:  1. Coping with anxiety   2. Coping with boredom   3. Coping with [facility]         4. Risk for relapse           Goals and Objectives:  1. Goal:  Abstain from alcohol and illicit drugs   Objective measure: Negative breathalyzer, negative urine screens   Time frame to reach goal: By discharge    2  Goal: Attend daily 12-step meetings   Objective measure: Signed attendance sheet daily   Time frame to reach goal: Each  day    3. Goal: Participate in group sessions    Objective measure: Progress notes indicating active listening, self-disclosure,   feedback   Time frame to reach goal: Each day    4. Goal: Obtain a 12-step sponsor   Objective measure: self-report   Time frame to reach goal: By discharge    5. Goal: Complete Life Story   Objective measure: Share story with group   Time frame to reach goal: Within first two weeks of treatment    6. Goal: Complete First Step   Objective measure: Share 1st step with group   Time frame to reach goal:  By discharge    7. Goal: Complete Relapse Prevention Plan   Objective measure: Share plan with group   Time frame to reach goal: By discharge    8.  Goal: Family involvement/participation   Objective measure: Family session documented in progress notes   Time frame to reach goal: By discharge    9.  Goal: Reduce anxiety   Objective measure: Physician progress note indicating anxiety is improved   Time frame to reach goal: By discharge                            Group Interventions:  Psychodynamic Group Psychotherapy  1 hour, five times per week  Goals: 1. Utilize group empathy and support for problem solving; 2. Apply stress management, communication, and assertive skills to personal issues; 3. Discuss negative consequences of addictive behavior; 4. Discuss ways to change lifestyle to support sobriety; 5. Discuss addiction history    Addiction Education Group  1 hour, 2 times per week  Goals:  1. Verbalize increased knowledge of the process of recovery; 2. Understand basic concepts of addiction (denial, powerlessness, unmanageabiltiy, etc.); 3. Develop a consistent, positive image of self    Steps to Recovery Group  1 hour, 1 time per week  Goals:  1. Learn 12 steps; 2. Identify ways to incorporate 12 step principles into daily life; 3. Complete first step; 4. Verbalize knowledge and understanding of the concept of a higher power    Living Sober Group  1 hour, 2 times per week  Goals:  1.   Reflect upon events of day/weekend, focusing on positive change; 2.  Discuss dynamics of 12 step meetings attended; 3. Discuss topics from book Living Sober     Stress Management Skills Group  1 hour, 3 times per week  Goals: 1. Identify types and levels of stress; 2. Identify and change maladaptive beliefs and behaviors; 3. Identify and practice techniques of stress management    Disease Concept Group  1 hour, 1 time per week  Goals: 1. Verbalize an understanding of the disease concept of addiction; 2. Increase familys understanding of the disease concept of addiction    Communication Skills Group  1 hour, 2 times per week  Goals: 1. Learn rules of effective communication; 2. Improve listening skills; 3. Practice clear communication    Promoting Healthy Lifestyles Group  1 hour, 1 time per week  Goals:  1. Understand the biopsychosocial model of health; 2. Develop insight into how substance abuse/dependency can impact dimensions of health; 3. Develop appropriate health promotion strategies    Relationship Dynamics Group  1 hour, 1 time per week  Goals:  1. Learn about factors that shape relationships; 2. Understand the central role of relationships in personal well-being; 3. Learn how to improve all relationships    Medical Complications Group  1 hour, 1 time per week  Goals:  1.  Increase knowledge of how addiction negatively affects the body; 2. Increase awareness of how abstinence can positively impact health

## 2019-04-18 ENCOUNTER — HOSPITAL ENCOUNTER (OUTPATIENT)
Dept: PSYCHIATRY | Facility: HOSPITAL | Age: 64
Discharge: HOME | End: 2019-04-18
Attending: PSYCHIATRY & NEUROLOGY
Payer: COMMERCIAL

## 2019-04-18 DIAGNOSIS — F10.931 ALCOHOL WITHDRAWAL SYNDROME, WITH DELIRIUM: ICD-10-CM

## 2019-04-18 DIAGNOSIS — F10.20 ALCOHOL USE DISORDER, SEVERE, DEPENDENCE: Primary | ICD-10-CM

## 2019-04-18 DIAGNOSIS — F10.20 ALCOHOL USE DISORDER, SEVERE, IN CONTROLLED ENVIRONMENT: ICD-10-CM

## 2019-04-18 LAB
BREATH ALCOHOL: 0
ETHYL GLUCURONIDE: NEGATIVE

## 2019-04-18 PROCEDURE — 90853 PR GROUP PSYCHOTHERAPY: ICD-10-PCS | Mod: ,,, | Performed by: PSYCHOLOGIST

## 2019-04-18 PROCEDURE — 90853 GROUP PSYCHOTHERAPY: CPT | Mod: ,,, | Performed by: PSYCHOLOGIST

## 2019-04-18 PROCEDURE — 90853 GROUP PSYCHOTHERAPY: CPT
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
## 2019-04-18 NOTE — PROGRESS NOTES
Group Psychotherapy (PhD/LCSW)    Site: [facility]    Clinical status of patient: Intensive Outpatient Program (IOP)    Date: 4/18/2019    Group Focus: Psychodynamic Group Psychotherapy    Length of service: 34519 - 45-50 minutes    Number of patients in attendance: 5    Referred by: Addictive Behavior Unit Treatment Team    Target symptoms: Alcohol Abuse    Patient's response to treatment: Active Listening and Self-disclosure    Progress toward goals: Progressing adequately    Interval History: Shared his story with new group member.    Diagnosis: alcohol use disorder, severe, dependence    Plan: Continue treatment on ABU

## 2019-04-18 NOTE — PROGRESS NOTES
Group Psychotherapy (PhD/LCSW)    Site: [facility]    Clinical status of patient: Intensive Outpatient Program (IOP)    Date: 4/18/2019    Group Focus: DBT-Based Group Therapy    Length of service: 32787 - 45-50 minutes    Number of patients in attendance: 9    Referred by: Addictive Behavior Unit Treatment Team    Target symptoms: Alcohol Abuse    Patient's response to treatment: Active Listening-; Self-disclosure    Progress toward goals: Progressing adequately    Interval History: Session focus was Emotion Regulation:  ABC PLEASE.  Patients were encouraged to reduce vulnerability to distress by accumulating positive emotions, building mastery, coping ahead, and by attending to physical well-being.  Each patient identified a way to accumulate positive emotions and build mastery.    Diagnosis: alcohol use disorder, severe, dependence,     Plan: Continue treatment on ABU

## 2019-04-18 NOTE — PROGRESS NOTES
Group Psychotherapy (PhD/LCSW)    Site: [facility]    Clinical status of patient: Intensive Outpatient Program (IOP)    Date: 4/18/2019    Group Focus: Strength Training      Length of service: 01672 - 45-50 minutes    Number of patients in attendance: 9    Referred by: Addictive Behavior Unit Treatment Team    Target symptoms: Alcohol Abuse    Patient's response to treatment: Active Listening    Progress toward goals: Progressing adequately    Interval History: Discussed the danger posed by self-fulfilling prophecies and how to avoid same.     Diagnosis: alcohol use disorder, severe, dependence,     Plan: Continue treatment on ABU

## 2019-04-22 ENCOUNTER — HOSPITAL ENCOUNTER (OUTPATIENT)
Dept: PSYCHIATRY | Facility: HOSPITAL | Age: 64
Discharge: HOME | End: 2019-04-22
Attending: PSYCHIATRY & NEUROLOGY
Payer: COMMERCIAL

## 2019-04-22 VITALS — DIASTOLIC BLOOD PRESSURE: 81 MMHG | RESPIRATION RATE: 18 BRPM | SYSTOLIC BLOOD PRESSURE: 166 MMHG | HEART RATE: 101 BPM

## 2019-04-22 DIAGNOSIS — F10.20 ALCOHOL USE DISORDER, SEVERE, IN CONTROLLED ENVIRONMENT: ICD-10-CM

## 2019-04-22 DIAGNOSIS — F10.20 ALCOHOL USE DISORDER, SEVERE, DEPENDENCE: Primary | ICD-10-CM

## 2019-04-22 DIAGNOSIS — F10.931 ALCOHOL WITHDRAWAL SYNDROME, WITH DELIRIUM: ICD-10-CM

## 2019-04-22 DIAGNOSIS — M25.561 RIGHT KNEE PAIN, UNSPECIFIED CHRONICITY: Primary | ICD-10-CM

## 2019-04-22 LAB
AMPHET+METHAMPHET UR QL: NEGATIVE
BARBITURATES UR QL SCN>200 NG/ML: NEGATIVE
BENZODIAZ UR QL SCN>200 NG/ML: NEGATIVE
BREATH ALCOHOL: 0
BZE UR QL SCN: NEGATIVE
CANNABINOIDS UR QL SCN: NEGATIVE
CREAT UR-MCNC: 22 MG/DL (ref 23–375)
ETHANOL UR-MCNC: <10 MG/DL
METHADONE UR QL SCN>300 NG/ML: NEGATIVE
OPIATES UR QL SCN: NEGATIVE
PCP UR QL SCN>25 NG/ML: NEGATIVE
TOXICOLOGY INFORMATION: ABNORMAL

## 2019-04-22 PROCEDURE — 99232 PR SUBSEQUENT HOSPITAL CARE,LEVL II: ICD-10-PCS | Mod: ,,, | Performed by: PSYCHIATRY & NEUROLOGY

## 2019-04-22 PROCEDURE — 90853 PR GROUP PSYCHOTHERAPY: ICD-10-PCS | Mod: ,,, | Performed by: PSYCHOLOGIST

## 2019-04-22 PROCEDURE — 90853 GROUP PSYCHOTHERAPY: CPT

## 2019-04-22 PROCEDURE — 80307 DRUG TEST PRSMV CHEM ANLYZR: CPT

## 2019-04-22 PROCEDURE — 90853 GROUP PSYCHOTHERAPY: CPT | Mod: ,,, | Performed by: PSYCHOLOGIST

## 2019-04-22 PROCEDURE — 90853 GROUP PSYCHOTHERAPY: CPT | Mod: 59,,, | Performed by: PSYCHOLOGIST

## 2019-04-22 PROCEDURE — 90853 PR GROUP PSYCHOTHERAPY: ICD-10-PCS | Mod: 59,,, | Performed by: PSYCHOLOGIST

## 2019-04-22 PROCEDURE — 99232 SBSQ HOSP IP/OBS MODERATE 35: CPT | Mod: ,,, | Performed by: PSYCHIATRY & NEUROLOGY

## 2019-04-22 NOTE — PROGRESS NOTES
"4/22/2019 8:43 AM  Donald Warren Abadie  1955  [id number]    [facility] Intensive Outpatient Program Progress Note    STATUS:  Intensive Outpatient Program (IOP)    SUBJECTIVE:   Chief Complaint: substance abuse    History of Present Illness:   Donald Warren Abadie is a 63 y.o. male with a past psychiatric history of anxiety, unspecified , who presented to ABU IOP due to alcohol abuse.     Patient was recently seen by Psychiatry CL service after a recent hospital stay, per their note 3/27/19:  "As per the patient and the daughter, the patient has been drinking since he was 14 years old. Pt began drinking heavily after retiring five years ago and as per the daughter, it has worsened over the past year. During the past two months he has left the house only to get more alcohol and has not been participating in his normal activities. The patient says that he was drinking about 18 drinks per day. Attempted sobriety for the first time 8 weeks ago and experienced tremors and withdrawal symptoms (denies DTs) and resumed drinking. The patient states that he tried quitting alcohol 3.5 weeks ago because he was "feeling bad" and also accidentally overdosed on his BB and flecainide to feel better, which resulted in his [facility] admission. He endorsed sx of EtOH withdrawal on initial presentation, including anxiety, diaphoresis, hallucinations, and hallucinations.      The daughter believes that he is drinking more because he has nothing to occupy since retiring. She also thinks that his anxiety and depression have gotten worse and he uses drinking as a coping mechanism. The patient has had anxiety all his life, but it has worsened over the past few years. Anxiety is worse at night and reports poor sleep as a result. His depression has also worsened. He has tried Ativan in the past but was taken off of it because, as daughter states, he was addicted to it. He has also tried Effexor, Lexapro and Cymbalta. The daughter " "states that he was never on them long enough for them to work. She saw a difference with Cymbalta but thinks he stopped taking it because of the side effects. "    =================================    - Patient confirmed psych hx in above report. Was admitted to the hospital with extensive ICU stay from 3/15/19-3/29/19, was admitted following an accidental overdose of HTN medications, per family alcohol involved with the incident and patient showed signs of alcohol withdrawal when admitted.     Pleasant, linear throughout interview. Says only was drinking beer, confirms 18 beers per dayno liquor or wine. Says once he had no work obligations he started drinking all day (vs drinks after work). Drank 12 oz bud lights. Last drink was 3/15/19 after which patient was admitted to hospital (see above). Cites "dying twice" in the ICU as a wake up call - "I don't want to die!". Confirmed withdrawal symptoms previously reported when he tried to quit - says the first time stopped 2 weeks cold turkey (longest period sobriety since 14 y/o), second time was one week also with withdrawal symptoms. Says both times eventually the withdrawal symptoms went away but took about a week. Denies any seizures at that time, denies hx seizures. Says has been drinking daily since 14 y/o but denies any issues with work - "I never missed a day of work". Says after [employer] is when he started to acknowledge possibility of a problem "you better slow down". Denies alcohol related social problems. Cites medical problems and ICU stay as main wake-up calls.     Reports ongoing anxiety worse at night, says often affects his sleep. Wide ranging anxiety, says he can wake from sleep anxious and be unable to return to sleep. Started Remeron since discharge with planned increase to 15 mg soon. Feels it is helping with sleep "for the most part" and thinks he has some daytime benefits as well regarding anxiety. Denies mood issues. Relates to [employer], says " "when he was working he was "wore out" at home and didn't have trouble with generalized anxiety or insomnia. Denies any hx SI/HI. During aforementioned withdrawals denies any periods of disorientation or AVH, though collateral reports indicate likely hx DTs. Does acknowledge he does not remember much of ICU stay (chart review with possible AVH). Denies illicit drug use, denies hx addiction issues with illicits.    Denies recent mood issues. Reports sleep and anxiety has improved since starting Remeron. Otherwise pan negative on SIGECAPS since recent hospital discharge.     =====================    INTERVAL HX    4/15/19  Patient doing well today. Had a good weekend, worked on pool this weekend. Mood stable and good. Eating and sleeping well at home. Reports increased appetite with Remeron. Denies other side effects. Denies SI/HI/AVH. Afternoon AA meetings going well, patient attending daily. Feels he has been surprised he enjoys AA as much as he does. Good insight to benefits of daily attendance even after maintaining sobriety, discussed people at meetings that have been sober ten years. In process of finding a sponsor. Group here going well. Denies cravings. Says anxiety is improving "everything is getting better". Patient with no other questions for MD, and does not have any particular topics he would like to discuss when offered.     Toxicology Screen: Alcohol biomarkers 4/12 negative; Utox 4/12 negative; breath alcohol 4/12 negative   Medication Side Effects: None  Cravings: no  No other acute psychiatric issues reported at this time.    4/17/19  Patient doing well. Reports mood doing good and stable. Says son-in-law came in for family today, explained for privacy reasons son-in-law unable to attend group meetings with other patients, patient voiced understanding. Says main result of family day is patient would like to address his hurt knee sooner rather than later. Picked up rx, plans to trial naltrexone at home " this weekend in case he gets nauseous. AA meetings going well, group here going well. Denies SI/HI/AVH. Plans to address knee as soon as done with program. Denies cravings , does report eating more. Sleeping well at home. Reports night time anxiety continues to improve. Denies medication side effects outside of possible increased appetite. Patient with no other questions for MD, and does not have any particular topics he/she would like to discuss when offered.      Toxicology Screen: Alcohol biomarkers 4/12 negative, 4/15 in process; Utox 4/15 negative; breath alcohol 4/16 negative   Medication Side Effects: increased appetite, though likely also due to sobriety and decreased etoh calories  Cravings: no  No other acute psychiatric issues reported at this time.    4/22/19  Patient doing well today. Reports had a good weekend. Didn't trial naltrexone over the weekend, plans to soon but plans to start by taking a quarter to minimize side effects. Patient had crawfish boil over the weekend, was able to avoid drinking and denies cravings. Denies physical complaints outside of knee pain. Eating and sleeping well. Planning to move remeron dose up to avoid morning grogginess. Denies SI/HI/AVH. AA meetings going well, group here going well. Denies medication side effects otherwise. Patient with no other questions for MD, and does not have any particular topics he/she would like to discuss when offered.    Toxicology Screen: Alcohol biomarkers 4/15 negative; Utox 4/17 negative; breath alcohol 4/18 negative   Medication Side Effects: denied  Cravings: no  No other acute psychiatric issues reported at this time.      Allergies:  No known drug allergies  Medical/Surgical History:  Past Medical History:   Diagnosis Date    A-fib     Alcohol abuse     Arthritis     Chronic gout     Diabetes mellitus     DM (diabetes mellitus) 11/16/2016    Fatty liver     Hyperlipidemia     Renal cell cancer 2014     Past Surgical  History:   Procedure Laterality Date    ABSCESS DRAINAGE      perirectal    COLONOSCOPY      FISTULOTOMY N/A 5/19/2015    [facility] at [facility]    HAND SURGERY Left     HEMORRHOIDECTOMY N/A 5/19/2015    Performed [facility] at [facility] OR 2[facility]    KIDNEY SURGERY      partial left kidney removal - [place]    PARTIAL NEPHRECTOMY Left August 2014    ROBOTIC ASSISTED LAPAROSCOPIC NEPHRECTOMY PARTIAL Left 8/28/2014    Performed by Fabian Estevez MD at [facility] OR [address] [place] [address]     OBJECTIVE:     Current Medications:   Infusions:    Scheduled:    PRN:    Home Medications:  Prior to Admission medications    Medication Sig Start Date End Date Taking? Authorizing Provider   allopurinol (ZYLOPRIM) 100 MG tablet TAKE 2 TABLETS BY MOUTH DAILY 2/22/19   Diana Hemphill MD   apixaban (ELIQUIS) 5 mg Tab Take 1 tablet (5 mg total) by mouth 2 (two) times daily. 3/4/19   Uriel Tolentino MD   aspirin 81 MG Chew Take 1 tablet (81 mg total) by mouth once daily. 3/4/19 3/3/20  Uriel Tolentino MD   cyanocobalamin (VITAMIN B-12) 1000 MCG tablet Take 1 tablet (1,000 mcg total) by mouth once daily. 3/30/19   DEAN Vigil MD   fenofibrate 160 MG Tab Take 1 tablet (160 mg total) by mouth every evening. 10/29/18   Bacilio Larry MD   flecainide (TAMBOCOR) 150 MG Tab Take 1 tablet (150 mg total) by mouth 2 (two) times daily. 3/7/19 4/9/19  Uriel Tolentino MD   folic acid (FOLVITE) 1 MG tablet Take 1 tablet (1 mg total) by mouth once daily. 3/30/19 3/29/20  DEAN Vigil MD   metoprolol tartrate (LOPRESSOR) 25 MG tablet NOT CURRENTLY TAKING Take 0.5 tablets (12.5 mg total) by mouth 2 (two) times daily. 3/29/19 3/28/20  DEAN Vigil MD   mirtazapine (REMERON) 15 MG tablet Take 1 tablet (15 mg total) by mouth every evening. 4/9/19 4/8/20  Bacilio Larry MD   multivitamin (THERAGRAN) tablet Take 1 tablet by mouth once daily. 3/30/19   DEAN Vigli MD   omega-3 acid ethyl  "esters (LOVAZA) 1 gram capsule Take 2 g by mouth 2 (two) times daily.    Historical Provider, MD   pantoprazole (PROTONIX) 40 MG tablet Take 1 tablet (40 mg total) by mouth once daily. 3/30/19 3/29/20  WJf Vigil MD   ranitidine (ZANTAC) 150 MG capsule Take 150 mg by mouth 2 (two) times daily.    Historical Provider, MD   rosuvastatin (CRESTOR) 10 MG tablet Take 1 tablet (10 mg total) by mouth once daily. 3/4/19 3/3/20  Uriel Tolentino MD   thiamine 100 MG tablet Take 1 tablet (100 mg total) by mouth once daily. 3/30/19   W. Daljit Vgiil MD     NOT CURRENTLY TAKING METOPROLOL    Vital Signs:  There were no vitals filed for this visit.      Mental Status Exam:  Appearance: unremarkable, age appropriate, casually dressed, good grooming  Level of Consciousness: alert  Behavior/Cooperation: friendly and cooperative  Psychomotor: unremarkable   Speech: normal tone, normal rate, normal pitch, normal volume, accented  Language: english, fluid  Orientation: grossly oriented  Attention Span/Concentration: intact  Memory: grossly intact  Mood: "good"  Affect: normal  Thought Process: linear, normal and logical  Associations: normal and logical  Thought Content: normal, no suicidality, no homicidality, delusions, or paranoia  Fund of Knowledge: Aware of current events   Abstraction: intact to conversation  Insight: fair  Judgment: fair    Labs/Imaging/Studies:   No results found for this or any previous visit (from the past 24 hour(s)).   ASSESSMENT     Donald Warren Abadie is a 63 y.o. male with a past psychiatric history of anxiety, who presented to [facility] due to alcoho abuse. 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 Patient and family describe worsening alcohol intake leading to significant social isolation. Patient with history legal complications (DWI) from alcohol use. Patient very recently admitted for an extensive ICU stay after " accidentally overdosing on HTN medications while intoxicated from alcohol use. At presentation to the ER patient was noted to have multiple alcohol withdrawal symptoms as well reports from family consistent with Delirium Tremens. Patient is also high risk for mood disorder if he were to relapse, with multiple static factors (, older white male with medical comorbidities and hx substance abuse) that would contribute to risk of completed suicide.    IMPRESSION  - alcohol use disorder, severe  - anxiety, unspecified    PLAN     · Start patient on ABU protocol.  · Breathalyzer daily and urine toxicology at least three times per week.  · VS daily x 3 days.  · CBC and CMP unremarkable, LFTs WNL  · Patient counseled on abstinence from alcohol.    Medications: Continue current medications.  · Continue Remeron 15 mg PO nightly for insomnia and anxiety. Patient reports good effect with no reported troublesome side effects.  · Discussed possibility of SSRI or other antidepressant for anxiety with patient at admit. He is open to the possibility but patient and MD agreed to revisit this option as he progresses in program - at admit had recent  increase in Remeron dosage (7.5 to 15 nightly) and patient feels he is sleeping well with anxiety lessened and restricted to night time only.   · Patient to trial naltrexone over the weekend at starting dose 25 mg PO in anticipation of taking regularly in the future (especially in context of likely knee surgery after this program), will monitor for side effects after the weekend.  · Medications for alcohol use disorder were discussed including acamprosate, naltrexone and disulfiram.     Status: Continue treatment on ABU

## 2019-04-22 NOTE — PROGRESS NOTES
Group Psychotherapy (PhD/LCSW)    Site: [facility]    Clinical status of patient: Intensive Outpatient Program (IOP)    Date: 4/22/2019    Group Focus: Communication Skills     Length of service: 53549 - 45-50 minutes    Number of patients in attendance: 9    Referred by: Add[facility]ive Behavior Unit Treatment Team    Target symptoms: Alcohol Abuse    Patient's response to treatment: Active Listening; Self-disclosure    Progress toward goals: Progressing adequately    Interval History:  Discussed the way differences in gender, fx of origin background, ethnicity, and generation can impair the communication process when not taken into account.     Diagnosis: alcohol use disorder, severe, dependence,     Plan: Continue treatment on ABU

## 2019-04-22 NOTE — PROGRESS NOTES
Group Psychotherapy (PhD/LCSW)    Site: [facility]    Clinical status of patient: Intensive Outpatient Program (IOP)    Date: 4/22/2019    Group Focus: Positive Psychology    Length of service: 13305 - 45-50 minutes    Number of patients in attendance: 9    Referred by: Addictive Behavior Unit Treatment Team    Target symptoms: Alcohol Abuse    Patient's response to treatment: Active Listening; Self-disclosure    Progress toward goals: Progressing adequately    Interval History: Session focus was Positive Psychology.  Each patient read the 24 character strengths and identified specific ways they exhibited character strengths for each category.    Diagnosis: alcohol use disorder, severe, dependence,     Plan: Continue treatment on ABU

## 2019-04-22 NOTE — PROGRESS NOTES
Group Psychotherapy (PhD/LCSW)    Site: [facility] [address]    Clinical status of patient: Intensive Outpatient Program (IOP)    Date: 4/22/2019    Group Focus: Psychodynamic Group Psychotherapy    Length of service: 09015 - 45-50 minutes    Number of patients in attendance: 5    Referred by: Addictive Behavior Unit Treatment Team    Target symptoms: Alcohol Abuse    Patient's response to treatment: Active Listening and Self-disclosure    Progress toward goals: Progressing adequately    Interval History: Reported being able to enjoy a crawfish boil without etoh for the first time in 50 years. Noted that he is less anxious now which he attributes to sobriety and medication changes.     Diagnosis: alcohol use disorder, severe, dependence    Plan: Continue treatment on ABU

## 2019-04-23 ENCOUNTER — HOSPITAL ENCOUNTER (OUTPATIENT)
Dept: RADIOLOGY | Facility: HOSPITAL | Age: 64
Discharge: HOME | End: 2019-04-23
Attending: PHYSICIAN ASSISTANT
Payer: COMMERCIAL

## 2019-04-23 ENCOUNTER — OFFICE VISIT (OUTPATIENT)
Dept: ORTHOPEDICS | Facility: CLINIC | Age: 64
End: 2019-04-23
Payer: COMMERCIAL

## 2019-04-23 ENCOUNTER — HOSPITAL ENCOUNTER (OUTPATIENT)
Dept: PSYCHIATRY | Facility: HOSPITAL | Age: 64
Discharge: HOME | End: 2019-04-23
Attending: PSYCHIATRY & NEUROLOGY
Payer: COMMERCIAL

## 2019-04-23 VITALS — BODY MASS INDEX: 30.04 KG/M2 | WEIGHT: 180.31 LBS | HEIGHT: 65 IN

## 2019-04-23 DIAGNOSIS — F10.931 ALCOHOL WITHDRAWAL SYNDROME, WITH DELIRIUM: ICD-10-CM

## 2019-04-23 DIAGNOSIS — F10.20 ALCOHOL USE DISORDER, SEVERE, IN CONTROLLED ENVIRONMENT: ICD-10-CM

## 2019-04-23 DIAGNOSIS — M17.11 PRIMARY OSTEOARTHRITIS OF RIGHT KNEE: Primary | ICD-10-CM

## 2019-04-23 DIAGNOSIS — M25.561 RIGHT KNEE PAIN, UNSPECIFIED CHRONICITY: ICD-10-CM

## 2019-04-23 DIAGNOSIS — F10.20 ALCOHOL USE DISORDER, SEVERE, DEPENDENCE: Primary | ICD-10-CM

## 2019-04-23 LAB — BREATH ALCOHOL: 0

## 2019-04-23 PROCEDURE — 3008F BODY MASS INDEX DOCD: CPT | Mod: CPTII,S$GLB,, | Performed by: PHYSICIAN ASSISTANT

## 2019-04-23 PROCEDURE — 90832 PSYTX W PT 30 MINUTES: CPT | Mod: ,,, | Performed by: PSYCHIATRY & NEUROLOGY

## 2019-04-23 PROCEDURE — 90853 PR GROUP PSYCHOTHERAPY: ICD-10-PCS | Mod: 59,,, | Performed by: PSYCHOLOGIST

## 2019-04-23 PROCEDURE — 73564 X-RAY EXAM KNEE 4 OR MORE: CPT | Mod: 26,RT,, | Performed by: RADIOLOGY

## 2019-04-23 PROCEDURE — 73562 X-RAY EXAM OF KNEE 3: CPT | Mod: TC,RT

## 2019-04-23 PROCEDURE — 73562 X-RAY EXAM OF KNEE 3: CPT | Mod: 26,59,RT, | Performed by: RADIOLOGY

## 2019-04-23 PROCEDURE — 90853 GROUP PSYCHOTHERAPY: CPT | Mod: 59,,, | Performed by: PSYCHOLOGIST

## 2019-04-23 PROCEDURE — 73564 XR KNEE ORTHO RIGHT WITH FLEXION: ICD-10-PCS | Mod: 26,RT,, | Performed by: RADIOLOGY

## 2019-04-23 PROCEDURE — 90853 GROUP PSYCHOTHERAPY: CPT | Mod: ,,, | Performed by: PSYCHOLOGIST

## 2019-04-23 PROCEDURE — 90853 PR GROUP PSYCHOTHERAPY: ICD-10-PCS | Mod: ,,, | Performed by: PSYCHOLOGIST

## 2019-04-23 PROCEDURE — 99213 OFFICE O/P EST LOW 20 MIN: CPT | Mod: S$GLB,,, | Performed by: PHYSICIAN ASSISTANT

## 2019-04-23 PROCEDURE — 99213 PR OFFICE/OUTPT VISIT, EST, LEVL III, 20-29 MIN: ICD-10-PCS | Mod: S$GLB,,, | Performed by: PHYSICIAN ASSISTANT

## 2019-04-23 PROCEDURE — 99999 PR PBB SHADOW E&M-EST. PATIENT-LVL III: CPT | Mod: PBBFAC,,, | Performed by: PHYSICIAN ASSISTANT

## 2019-04-23 PROCEDURE — 3008F PR BODY MASS INDEX (BMI) DOCUMENTED: ICD-10-PCS | Mod: CPTII,S$GLB,, | Performed by: PHYSICIAN ASSISTANT

## 2019-04-23 PROCEDURE — 90853 GROUP PSYCHOTHERAPY: CPT

## 2019-04-23 PROCEDURE — 73562 XR KNEE ORTHO RIGHT WITH FLEXION: ICD-10-PCS | Mod: 26,59,RT, | Performed by: RADIOLOGY

## 2019-04-23 PROCEDURE — 99999 PR PBB SHADOW E&M-EST. PATIENT-LVL III: ICD-10-PCS | Mod: PBBFAC,,, | Performed by: PHYSICIAN ASSISTANT

## 2019-04-23 PROCEDURE — 90832 PR PSYCHOTHERAPY W/PATIENT, 30 MIN: ICD-10-PCS | Mod: ,,, | Performed by: PSYCHIATRY & NEUROLOGY

## 2019-04-23 NOTE — PROGRESS NOTES
Group Psychotherapy (PhD/LCSW)    Site: [facility]    Clinical status of patient: Intensive Outpatient Program (IOP)    Date: 4/23/2019    Group Focus: Disease Model of Addiction      Length of service: 12382 - 45-50 minutes    Number of patients in attendance: 5    Referred by: Addictive Behavior Unit Treatment Team    Target symptoms: Alcohol Abuse    Patient's response to treatment: Active Listening and Self-disclosure    Progress toward goals: Progressing adequately    Interval History: Discussed the basic neuropsychological concepts of the Disease Model of Addiction and their relationship to the phenomena of addiction (eg powerlessness, tolerance, euphoric recall, cravings, relapse, etc). Noted the way the Disease model comports with 12-step recovery practices and principles. Noted the value of understanding the Disease Model for sustaining long-term sobriety.    Diagnosis: alcohol use disorder, severe, dependence    Plan: Continue treatment on ABU

## 2019-04-23 NOTE — PROGRESS NOTES
"  SUBJECTIVE:     Chief Complaint : right knee pain    History of Present Illness:  Donald Warren Abadie is a 63 y.o. male seen in clinic today with a chief complaint of 3 year history of atraumatic right knee pain. Pt is retired . He played sports in his youth. Right medial knee pain has been worsening over the past several years. He admits to swelling after standing. He is unable to perform ADL because of the pain. He has tried injections (Euflexxa with no relief, PRP with one month of relief).  He uses topical analgesics. H/o alcohol abuse. Hospital stay in March for cardiac arrest believed to be secondary to bradycardia after etoh binge and metoprolol and felcainide overdose. Followed by Dr. Saldivar.  He is currently in rehab with d/c planned end of May. On Remeron and naltrexone. He feels as though his drinking was caused by his inability to be active and perform ADL secondary to knee pain. He has support system. His daughter Chani is very active in his care. He is on Eliquis for afib. H/o renal cell cancer s/p partial nephrectomy in 2014.    Past Medical History:   Diagnosis Date    A-fib     Alcohol abuse     Arthritis     Chronic gout     Diabetes mellitus     DM (diabetes mellitus) 11/16/2016    Fatty liver     Hyperlipidemia     Renal cell cancer 2014     Review of Systems:  Constitutional: no fever or chills  ENT: no nasal congestion or sore throat  Respiratory: no cough or shortness of breath  Cardiovascular: no chest pain or palpitations  Gastrointestinal: no nausea or vomiting, tolerating diet  Genitourinary: no hematuria or dysuria  Integument/Breast: no rash or pruritis  Hematologic/Lymphatic: no easy bruising or lymphadenopathy  Musculoskeletal: see HPI  Neurological: no seizures or tremors  Behavioral/Psych: no auditory or visual hallucinations    OBJECTIVE:     PHYSICAL EXAM:  Height 5' 5" (1.651 m), weight 81.8 kg (180 lb 5.4 oz).   General Appearance: WDWN, " NAD  Gait: antalgic  Neuro/Psych: Mood & affect appropriate  Lungs: Respirations equal and unlabored.   CV: 2+ bilateral upper and lower extremity pulses.   Skin: Intact throughout LE  Extremities: No LE edema    Right Knee Exam  Range of Motion:0-125 active   Effusion:yes  Condition of skin:intact  Location of tenderness:Medial joint line   Strength:4 of 5 quadriceps strength and 5 of 5 hamstring strength  Stability:stable to testing    Left Knee Exam  Range of Motion:0-130 active   Effusion:none  Condition of skin:intact  Location of tenderness:None   Strength:5 of 5 quadriceps strength and 5 of 5 hamstring strength  Stability:stable to testing  Minnie: negative/negative    Alignment: Moderate varus    Right Hip Examination:no pain with PROM    RADIOGRAPHS: AP, lateral and merchant knee x-rays ordered and images reviewed today by me reveal advanced degenerative changes with complete loss of medial joint space    ASSESSMENT/PLAN:   Primary osteoarthritis right knee  - Pt has failed injections, medications. Pain interfering with ADL.   - Pt in rehab with good family support system. Drinking secondary to knee pain and effect on his life.   - He is ready for TKA. Will discuss with Dr. Huerta as he has seen patient in the past. Tentative date for surgery end of June given.

## 2019-04-23 NOTE — PROGRESS NOTES
Group Psychotherapy (PhD/LCSW)    Site: [facility]    Clinical status of patient: Intensive Outpatient Program (IOP)    Date: 4/23/2019    Group Focus: CBT Group Psychotherapy    Length of service: 37277 - 45-50 minutes    Number of patients in attendance: 10    Referred by: Addictive Behavior Unit Treatment Team    Target symptoms: Alcohol Abuse    Patient's response to treatment: Active Listening; Self-disclosure    Progress toward goals: Progressing adequately    Interval History: Session focus was Sleep Hygiene.  Patients were provided with sleep hygiene strategies and instructions for calm breathing, setting up a bedtime routine, and having a thinking/worry hour in order to facilitate efficient and restful sleep.    Diagnosis: alcohol use disorder, severe, dependence,     Plan: Continue treatment on ABU

## 2019-04-23 NOTE — PROGRESS NOTES
Group Psychotherapy (PhD/LCSW)    Site: [facility]    Clinical status of patient: Intensive Outpatient Program (IOP)    Date: 4/23/2019    Group Focus: Steps to Recovery    Length of service: 07526 - 45-50 minutes    Number of patients in attendance: 5    Referred by: Addictive Behavior Unit Treatment Team    Target symptoms: Alcohol Abuse    Patient's response to treatment: Active Listening; Self-disclosure    Progress toward goals: Progressing adequately    Interval History: We read and discussed the article Relapse Prevention and the Five Rules of Recovery by Emiliano Nichole.  He noted how he maintains communication about addiction within his [place] of recovery because other friends would try to tease him or sabotage him if they knew.    Diagnosis: alcohol use disorder, severe, dependence,     Plan: Continue treatment on ABU

## 2019-04-23 NOTE — PROGRESS NOTES
Group Psychotherapy (PhD/LCSW)    Site: [facility]    Clinical status of patient: Intensive Outpatient Program (IOP)    Date: 4/23/2019    Group Focus: Psychodynamic Group Psychotherapy    Length of service: 95489 - 45-50 minutes    Number of patients in attendance: 5    Referred by: Addictive Behavior Unit Treatment Team    Target symptoms: Alcohol Abuse    Patient's response to treatment: Active Listening and Self-disclosure    Progress toward goals: Progressing adequately    Interval History: Discussed violent separation and divorce from wife after 1 year of marriage.    Diagnosis: alcohol use disorder, severe, dependence    Plan: Continue treatment on ABU

## 2019-04-24 ENCOUNTER — HOSPITAL ENCOUNTER (OUTPATIENT)
Dept: PSYCHIATRY | Facility: HOSPITAL | Age: 64
Discharge: HOME | End: 2019-04-24
Attending: PSYCHIATRY & NEUROLOGY
Payer: COMMERCIAL

## 2019-04-24 VITALS — DIASTOLIC BLOOD PRESSURE: 70 MMHG | RESPIRATION RATE: 16 BRPM | SYSTOLIC BLOOD PRESSURE: 128 MMHG | HEART RATE: 85 BPM

## 2019-04-24 DIAGNOSIS — F10.931 ALCOHOL WITHDRAWAL SYNDROME, WITH DELIRIUM: ICD-10-CM

## 2019-04-24 DIAGNOSIS — F10.20 ALCOHOL USE DISORDER, SEVERE, DEPENDENCE: Primary | ICD-10-CM

## 2019-04-24 DIAGNOSIS — F10.20 ALCOHOL USE DISORDER, SEVERE, IN CONTROLLED ENVIRONMENT: ICD-10-CM

## 2019-04-24 LAB
AMPHET+METHAMPHET UR QL: NEGATIVE
BARBITURATES UR QL SCN>200 NG/ML: NEGATIVE
BENZODIAZ UR QL SCN>200 NG/ML: NEGATIVE
BREATH ALCOHOL: 0
BZE UR QL SCN: NEGATIVE
CANNABINOIDS UR QL SCN: NEGATIVE
CREAT UR-MCNC: 26 MG/DL (ref 23–375)
ETHANOL UR-MCNC: <10 MG/DL
ETHYL GLUCURONIDE: NEGATIVE
METHADONE UR QL SCN>300 NG/ML: NEGATIVE
OPIATES UR QL SCN: NEGATIVE
PCP UR QL SCN>25 NG/ML: NEGATIVE
TOXICOLOGY INFORMATION: NORMAL

## 2019-04-24 PROCEDURE — 90853 GROUP PSYCHOTHERAPY: CPT | Performed by: SOCIAL WORKER

## 2019-04-24 PROCEDURE — 99232 PR SUBSEQUENT HOSPITAL CARE,LEVL II: ICD-10-PCS | Mod: ,,, | Performed by: PSYCHIATRY & NEUROLOGY

## 2019-04-24 PROCEDURE — 90853 PR GROUP PSYCHOTHERAPY: ICD-10-PCS | Mod: ,,, | Performed by: PSYCHOLOGIST

## 2019-04-24 PROCEDURE — 80307 DRUG TEST PRSMV CHEM ANLYZR: CPT

## 2019-04-24 PROCEDURE — 90853 GROUP PSYCHOTHERAPY: CPT | Mod: ,,, | Performed by: PSYCHOLOGIST

## 2019-04-24 PROCEDURE — 90853 PR GROUP PSYCHOTHERAPY: ICD-10-PCS | Mod: ,,, | Performed by: PSYCHIATRY & NEUROLOGY

## 2019-04-24 PROCEDURE — 99232 SBSQ HOSP IP/OBS MODERATE 35: CPT | Mod: ,,, | Performed by: PSYCHIATRY & NEUROLOGY

## 2019-04-24 PROCEDURE — 90853 GROUP PSYCHOTHERAPY: CPT | Mod: ,,, | Performed by: PSYCHIATRY & NEUROLOGY

## 2019-04-24 NOTE — PROGRESS NOTES
Group Psychotherapy (PhD/LCSW)    Site: [facility]    Clinical status of patient: Intensive Outpatient Program (IOP)    Date: 4/24/2019    Group Focus: DBT-Based Group Therapy    Length of service: 55171 - 45-50 minutes    Number of patients in attendance: 8    Referred by: Addictive Behavior Unit Treatment Team    Target symptoms: Alcohol Abuse    Patient's response to treatment: Active Listening-; Self-disclosure    Progress toward goals: Progressing adequately    Interval History: Session focus was Distress Tolerance:  STOP and Self-Soothe.  Patients were encouraged to use crisis survival skills to reduce intensity of distress.  Each patient identified something soothing for all five senses.    Diagnosis: alcohol use disorder, severe, dependence,     Plan: Continue treatment on ABU

## 2019-04-24 NOTE — PROGRESS NOTES
Group Psychotherapy (PhD/LCSW)    Site: [facility]    Clinical status of patient: Intensive Outpatient Program (IOP)    Date: 4/24/2019    Group Focus: Psychodynamic Group Psychotherapy    Length of service: 02247 - 45-50 minutes    Number of patients in attendance: 5    Referred by: Addictive Behavior Unit Treatment Team    Target symptoms: Alcohol Abuse    Patient's response to treatment: Active Listening and Self-disclosure    Progress toward goals: Progressing adequately    Interval History: Discussed how miserable he was the year he was .    Diagnosis: alcohol use disorder, severe, dependence    Plan: Continue treatment on ABU

## 2019-04-24 NOTE — PLAN OF CARE
04/24/19 1400   Activity/Group Therapy Checklist   Group Educational  (stages of change    )   Attendance Attended   Follows Direction Followed directions   Group Interactions/Observations Interacted appropriately   Affect/Mood Range Normal range   Affect/Mood Display Appropriate   Goal Progression Progressing

## 2019-04-24 NOTE — PROGRESS NOTES
"4/24/2019 8:43 AM  Donald Warren Abadie  1955  [id number]    Addictive Behavior Unit Intensive Outpatient Program Progress Note    STATUS:  Intensive Outpatient Program (IOP)    SUBJECTIVE:   Chief Complaint: substance abuse    History of Present Illness:   Donald Warren Abadie is a 63 y.o. male with a past psychiatric history of anxiety, unspecified , who presented to ABU IOP due to alcohol abuse.     Patient was recently seen by Psychiatry CL service after a recent hospital stay, per their note 3/27/19:  "As per the patient and the daughter, the patient has been drinking since he was 14 years old. Pt began drinking heavily after retiring five years ago and as per the daughter, it has worsened over the past year. During the past two months he has left the house only to get more alcohol and has not been participating in his normal activities. The patient says that he was drinking about 18 drinks per day. Attempted sobriety for the first time 8 weeks ago and experienced tremors and withdrawal symptoms (denies DTs) and resumed drinking. The patient states that he tried quitting alcohol 3.5 weeks ago because he was "feeling bad" and also accidentally overdosed on his BB and flecainide to feel better, which resulted [place] [facility] [place] He endorsed sx of EtOH withdrawal on initial presentation, including anxiety, diaphoresis, hallucinations, and hallucinations.      The daughter believes that he is drinking more because he has nothing to occupy since retiring. She also thinks that his anxiety and depression have gotten worse and he uses drinking as a coping mechanism. The patient has had anxiety all his life, but it has worsened over the past few years. Anxiety is worse at night and reports poor sleep as a result. His depression has also worsened. He has tried Ativan in the past but was taken off of it because, as daughter states, he was addicted to it. He has also tried Effexor, Lexapro and Cymbalta. The daughter " "states that he was never on them long enough for them to work. She saw a difference with Cymbalta but thinks he stopped taking it because of the side effects. "    =================================    - Patient confirmed psych hx in above report. Was admitted to the hospital with extensive ICU stay from 3/15/19-3/29/19, was admitted following an accidental overdose of HTN medications, per family alcohol involved with the incident and patient showed signs of alcohol withdrawal when admitted.     Pleasant, linear throughout interview. Says only was drinking beer, confirms 18 beers per dayno liquor or wine. Says once he had no work obligations he started drinking all day (vs drinks after work). Drank 12 oz bud lights. Last drink was 3/15/19 after which patient was admitted to hospital (see above). Cites "dying twice" in the ICU as a wake up call - "I don't want to die!". Confirmed withdrawal symptoms previously reported when he tried to quit - says the first time stopped 2 weeks cold turkey (longest period sobriety since 14 y/o), second time was one week also with withdrawal symptoms. Says both times eventually the withdrawal symptoms went away but took about a week. Denies any seizures at that time, denies hx seizures. Says has been drinking daily since 14 y/o but denies any issues with work - "I never missed a day of work". Says after snf is when he started to acknowledge possibility of a problem "you better slow down". Denies alcohol related social problems. Cites medical problems and ICU stay as main wake-up calls.     Reports ongoing anxiety worse at night, says often affects his sleep. Wide ranging anxiety, says he can wake from sleep anxious and be unable to return to sleep. Started Remeron since discharge with planned increase to 15 mg soon. Feels it is helping with sleep "for the most part" and thinks he has some daytime benefits as well regarding anxiety. Denies mood issues. Relates to snf, says " "when he was working he was "wore out" at home and didn't have trouble with generalized anxiety or insomnia. Denies any hx SI/HI. During aforementioned withdrawals denies any periods of disorientation or AVH, though collateral reports indicate likely hx DTs. Does acknowledge he does not remember much of ICU stay (chart review with possible AVH). Denies illicit drug use, denies hx addiction issues with illicits.    Denies recent mood issues. Reports sleep and anxiety has improved since starting Remeron. Otherwise pan negative on SIGECAPS since recent hospital discharge.     =====================    INTERVAL HX    4/15/19  Patient doing well today. Had a good weekend, worked on pool this weekend. Mood stable and good. Eating and sleeping well at home. Reports increased appetite with Remeron. Denies other side effects. Denies SI/HI/AVH. Afternoon AA meetings going well, patient attending daily. Feels he has been surprised he enjoys AA as much as he does. Good insight to benefits of daily attendance even after maintaining sobriety, discussed people at meetings that have been sober ten years. In process of finding a sponsor. Group here going well. Denies cravings. Says anxiety is improving "everything is getting better". Patient with no other questions for MD, and does not have any particular topics he would like to discuss when offered.     Toxicology Screen: Alcohol biomarkers 4/12 negative; Utox 4/12 negative; breath alcohol 4/12 negative   Medication Side Effects: None  Cravings: no  No other acute psychiatric issues reported at this time.    4/17/19  Patient doing well. Reports mood doing good and stable. Says son-in-law came in for family today, explained for privacy reasons son-in-law unable to attend group meetings with other patients, patient voiced understanding. Says main result of family day is patient would like to address his hurt knee sooner rather than later. Picked up rx, plans to trial naltrexone at home " this weekend in case he gets nauseous. AA meetings going well, group here going well. Denies SI/HI/AVH. Plans to address knee as soon as done with program. Denies cravings , does report eating more. Sleeping well at home. Reports night time anxiety continues to improve. Denies medication side effects outside of possible increased appetite. Patient with no other questions for MD, and does not have any particular topics he/she would like to discuss when offered.    Toxicology Screen: Alcohol biomarkers 4/12 negative, 4/15 in process; Utox 4/15 negative; breath alcohol 4/16 negative   Medication Side Effects: increased appetite, though likely also due to sobriety and decreased etoh calories  Cravings: no  No other acute psychiatric issues reported at this time.    4/22/19  Patient doing well today. Reports had a good weekend. Didn't trial naltrexone over the weekend, plans to soon but plans to start by taking a quarter to minimize side effects. Patient had crawfish boil over the weekend, was able to avoid drinking and denies cravings. Denies physical complaints outside of knee pain. Eating and sleeping well. Planning to move remeron dose up to avoid morning grogginess. Denies SI/HI/AVH. AA meetings going well, group here going well. Denies medication side effects otherwise. Patient with no other questions for MD, and does not have any particular topics he/she would like to discuss when offered.    Toxicology Screen: Alcohol biomarkers 4/15 negative; Utox 4/17 negative; breath alcohol 4/18 negative   Medication Side Effects: denied  Cravings: no  No other acute psychiatric issues reported at this time.    4/24/19  Patient doing well, eating well at home. Reports trouble sleeping last night. Says Remeron helps him sleep but feels he may need to take earlier in the evening to time effect, advised that is fine and he can time as he pleases in the evening. Pleasant discussion about jazz fest, knee/foot issues. Reports good  mood and night time anxiety not an issue or keeping him from sleeping, denies daytime anxiety. Denies SI/HI/AVH. Denies medication side effects. Endorses knee pain. AA going well, good insight. Group here going well. Denies cravings, patient says memories of ICU stay keep him from wanting. Patient didn't trail naltrexone over the weekend, will revisit on Friday for possible weekend trial.    Toxicology Screen: Alcohol biomarkers 4/15 negative. 4/22 pending; Utox 4/22 negative; breath alcohol 4/23 negative   Medication Side Effects: denied  Cravings: no  No other acute psychiatric issues reported at this time.    Allergies:  No known drug allergies  Medical/Surgical History:  Past Medical History:   Diagnosis Date    A-fib     Alcohol abuse     Arthritis     Chronic gout     Diabetes mellitus     DM (diabetes mellitus) 11/16/2016    Fatty liver     Hyperlipidemia     Renal cell cancer 2014     Past Surgical History:   Procedure Laterality Date    ABSCESS DRAINAGE      perirectal    COLONOSCOPY      FISTULOTOMY N/A 5/19/2015    Performed by Shane Hughes MD at [facility] OR [address] [place] [address]    HAND SURGERY Left     HEMORRHOIDECTOMY N/A 5/19/2015    Performed by Shane Hughes MD at [facility] OR [address] [place] [address]    KIDNEY SURGERY      partial left kidney removal - [place]    PARTIAL NEPHRECTOMY Left August 2014    ROBOTIC ASSISTED LAPAROSCOPIC NEPHRECTOMY PARTIAL Left 8/28/2014    Performed by Fabian Estevez MD at [facility] OR [address] [place] [address]     OBJECTIVE:     Current Medications:   Infusions:    Scheduled:    PRN:    Home Medications:  Prior to Admission medications    Medication Sig Start Date End Date Taking? Authorizing Provider   allopurinol (ZYLOPRIM) 100 MG tablet TAKE 2 TABLETS BY MOUTH DAILY 2/22/19   Diana Hemphill MD   apixaban (ELIQUIS) 5 mg Tab Take 1 tablet (5 mg total) by mouth 2 (two) times daily. 3/4/19   Uriel Tolentino MD   aspirin 81 MG Chew Take 1 tablet (81 mg total) by mouth once daily. 3/4/19  3/3/20  Uriel Tolentino MD   cyanocobalamin (VITAMIN B-12) 1000 MCG tablet Take 1 tablet (1,000 mcg total) by mouth once daily. 3/30/19   DEAN Vigil MD   fenofibrate 160 MG Tab Take 1 tablet (160 mg total) by mouth every evening. 10/29/18   Bacilio Larry MD   flecainide (TAMBOCOR) 150 MG Tab Take 1 tablet (150 mg total) by mouth 2 (two) times daily. 3/7/19 4/9/19  Uriel Tolentino MD   folic acid (FOLVITE) 1 MG tablet Take 1 tablet (1 mg total) by mouth once daily. 3/30/19 3/29/20  DEAN Vigil MD   metoprolol tartrate (LOPRESSOR) 25 MG tablet NOT CURRENTLY TAKING Take 0.5 tablets (12.5 mg total) by mouth 2 (two) times daily. 3/29/19 3/28/20  DEAN Vigil MD   mirtazapine (REMERON) 15 MG tablet Take 1 tablet (15 mg total) by mouth every evening. 4/9/19 4/8/20  Bacilio Larry MD   multivitamin (THERAGRAN) tablet Take 1 tablet by mouth once daily. 3/30/19   DEAN Vigil MD   omega-3 acid ethyl esters (LOVAZA) 1 gram capsule Take 2 g by mouth 2 (two) times daily.    Historical Provider, MD   pantoprazole (PROTONIX) 40 MG tablet Take 1 tablet (40 mg total) by mouth once daily. 3/30/19 3/29/20  DAEN Vigil MD   ranitidine (ZANTAC) 150 MG capsule Take 150 mg by mouth 2 (two) times daily.    Historical Provider, MD   rosuvastatin (CRESTOR) 10 MG tablet Take 1 tablet (10 mg total) by mouth once daily. 3/4/19 3/3/20  Uriel Tolentino MD   thiamine 100 MG tablet Take 1 tablet (100 mg total) by mouth once daily. 3/30/19   DEAN Vigil MD     NOT CURRENTLY TAKING METOPROLOL    Vital Signs:  There were no vitals filed for this visit.      Mental Status Exam:  Appearance: unremarkable, age appropriate, casually dressed, good grooming  Level of Consciousness: alert  Behavior/Cooperation: friendly and cooperative  Psychomotor: unremarkable   Speech: normal tone, normal rate, normal pitch, normal volume, accented  Language: english, fluid  Orientation: grossly oriented  Attention  "Span/Concentration: intact  Memory: grossly intact  Mood: "great"  Affect: normal  Thought Process: linear, normal and logical  Associations: normal and logical  Thought Content: normal, no suicidality, no homicidality, delusions, or paranoia  Fund of Knowledge: Aware of current events   Abstraction: intact to conversation  Insight: fair  Judgment: fair    Labs/Imaging/Studies:   Recent Results (from the past 24 hour(s))   POCT BREATH ALCOHOL TEST    Collection Time: 04/23/19  1:20 PM   Result Value Ref Range    Breath Alcohol 0.000       ASSESSMENT     Donald Warren Abadie is a 63 y.o. male with a past psychiatric history of anxiety, who presented to ABU IOP due to alcoho abuse. 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 Patient and family describe worsening alcohol intake leading to significant social isolation. Patient with history legal complications (DWI) from alcohol use. Patient very recently admitted for an extensive ICU stay after accidentally overdosing on HTN medications while intoxicated from alcohol use. At presentation to the ER patient was noted to have multiple alcohol withdrawal symptoms as well reports from family consistent with Delirium Tremens. Patient is also high risk for mood disorder if he were to relapse, with multiple static factors (, older white male with medical comorbidities and hx substance abuse) that would contribute to risk of completed suicide.    IMPRESSION  - alcohol use disorder, severe  - anxiety, unspecified    PLAN     · Start patient on ABU protocol.  · Breathalyzer daily and urine toxicology at least three times per week.  · VS daily x 3 days.  · CBC and CMP unremarkable, LFTs WNL  · Patient counseled on abstinence from alcohol.    Medications: Continue current medications.  · Continue Remeron 15 mg PO nightly for insomnia and anxiety. Patient reports good effect with no reported troublesome " side effects.  · Discussed possibility of SSRI or other antidepressant for anxiety with patient at admit. He is open to the possibility but patient and MD agreed to revisit this option as he progresses in program - at admit had recent  increase in Remeron dosage (7.5 to 15 nightly) and patient feels he is sleeping well with anxiety lessened and restricted to night time only.   · Patient to trial naltrexone over the weekend at starting dose 25 mg PO in anticipation of taking regularly in the future (especially in context of likely knee surgery after this program), will monitor for side effects after the weekend.  · Medications for alcohol use disorder were discussed including acamprosate, naltrexone and disulfiram.     Status: Continue treatment on ABU

## 2019-04-24 NOTE — PATIENT CARE CONFERENCE
ABU Staffing:   Alcohol use disorder, severe  Anxiety, unspecified        1. Pt is attending all groups    2. Pt is attending all meetings  3. Pt 's has minimally supportive family  4. Pt has completed spiritual assessment    5. Pt will present life story    6. Pt will present Step One assignment    7. Pt is exploring issues related to relapse  prevention; spirituality; stress management; improved communication skills; assertiveness training; poor self-esteem; disease concepts; cross addictions; and, work related issues    8. D/C date: TBD     Staff discussed pt's daughter attending family day, codependency within family dynamics, pt's motivation for sobriety, high risk for relapse and lack of social support. Staffing discussed pt engaging in AA, and the importance of pt attending different meetings closer to home, and concrete behavior.     Problem: Alcohol use disorder, severe  Goal: Address in 12 step meetings and group and individual sessions    Objective Measure: participation in groups, self report, length of sobriety, and relapse prevention plan  Time: Prior to discharge    Progress: Pt is attending groups and sessions       Problem: Anxiety, unspecified   Goal: Address in 12 step meetings and group and individual sessions    Objective Measure: participation in groups, self report, length of sobriety, and relapse prevention plan  Time: Prior to discharge    Progress: Pt is attending groups and sessions       Staff members present:   MD Dr. Malick Cross, Resident  Dr. Chavez, PhD  Patricia Taylor, [facility]  Yonathan Guidry, [facility]  Crystal Welch RN

## 2019-04-25 ENCOUNTER — HOSPITAL ENCOUNTER (OUTPATIENT)
Dept: PSYCHIATRY | Facility: HOSPITAL | Age: 64
Discharge: HOME | End: 2019-04-25
Attending: PSYCHIATRY & NEUROLOGY
Payer: COMMERCIAL

## 2019-04-25 DIAGNOSIS — F10.20 ALCOHOL USE DISORDER, SEVERE, IN CONTROLLED ENVIRONMENT: Primary | ICD-10-CM

## 2019-04-25 DIAGNOSIS — F10.931 ALCOHOL WITHDRAWAL SYNDROME, WITH DELIRIUM: ICD-10-CM

## 2019-04-25 LAB — BREATH ALCOHOL: 0

## 2019-04-25 PROCEDURE — 90834 PSYTX W PT 45 MINUTES: CPT

## 2019-04-25 PROCEDURE — 90853 GROUP PSYCHOTHERAPY: CPT | Performed by: SOCIAL WORKER

## 2019-04-25 PROCEDURE — 90853 GROUP PSYCHOTHERAPY: CPT

## 2019-04-25 PROCEDURE — 90853 PR GROUP PSYCHOTHERAPY: ICD-10-PCS | Mod: ,,, | Performed by: PSYCHOLOGIST

## 2019-04-25 PROCEDURE — 90853 GROUP PSYCHOTHERAPY: CPT | Mod: ,,, | Performed by: PSYCHOLOGIST

## 2019-04-25 NOTE — PLAN OF CARE
04/25/19 1400   Activity/Group Therapy Checklist   Group Activity  (Stress Maps)   Attendance Attended   Follows Direction Followed directions   Group Interactions/Observations Interacted appropriately   Affect/Mood Range Normal range   Affect/Mood Display Appropriate   Goal Progression Progressing

## 2019-04-25 NOTE — PROGRESS NOTES
Group Psychotherapy (PhD/LCSW)    Site: [facility]    Clinical status of patient: Intensive Outpatient Program (IOP)    Date: 4/25/2019    Group Focus: Psychodynamic Group Psychotherapy    Length of service: [id number] - 45-50 minutes    Number of patients in attendance: 5    Referred by: Addictive Behavior Unit Treatment Team    Target symptoms: Alcohol Abuse    Patient's response to treatment: Active Listening and Self-disclosure    Progress toward goals: Progressing adequately    Interval History: Discussed concerns about upcoming knee surgery.    Diagnosis: alcohol use disorder, severe, dependence    Plan: Continue treatment on ABU

## 2019-04-25 NOTE — PLAN OF CARE
04/25/19 1000   Activity/Group Therapy Checklist   Group Goals/Reflection   Attendance Attended   Follows Direction Followed directions   Group Interactions/Observations Interacted appropriately   Affect/Mood Range Normal range   Affect/Mood Display Appropriate   Goal Progression Progressing

## 2019-04-26 ENCOUNTER — HOSPITAL ENCOUNTER (OUTPATIENT)
Dept: PSYCHIATRY | Facility: HOSPITAL | Age: 64
Discharge: HOME | End: 2019-04-26
Attending: PSYCHIATRY & NEUROLOGY
Payer: COMMERCIAL

## 2019-04-26 VITALS — SYSTOLIC BLOOD PRESSURE: 142 MMHG | HEART RATE: 88 BPM | DIASTOLIC BLOOD PRESSURE: 82 MMHG | RESPIRATION RATE: 16 BRPM

## 2019-04-26 DIAGNOSIS — F10.931 ALCOHOL WITHDRAWAL SYNDROME, WITH DELIRIUM: ICD-10-CM

## 2019-04-26 DIAGNOSIS — F10.20 ALCOHOL USE DISORDER, SEVERE, IN CONTROLLED ENVIRONMENT: Primary | ICD-10-CM

## 2019-04-26 LAB
AMPHET+METHAMPHET UR QL: NEGATIVE
BARBITURATES UR QL SCN>200 NG/ML: NEGATIVE
BENZODIAZ UR QL SCN>200 NG/ML: NEGATIVE
BZE UR QL SCN: NEGATIVE
CANNABINOIDS UR QL SCN: NEGATIVE
CREAT UR-MCNC: 26 MG/DL (ref 23–375)
ETHANOL UR-MCNC: <10 MG/DL
METHADONE UR QL SCN>300 NG/ML: NEGATIVE
OPIATES UR QL SCN: NEGATIVE
PCP UR QL SCN>25 NG/ML: NEGATIVE
TOXICOLOGY INFORMATION: NORMAL

## 2019-04-26 PROCEDURE — 99231 SBSQ HOSP IP/OBS SF/LOW 25: CPT | Mod: ,,, | Performed by: PSYCHIATRY & NEUROLOGY

## 2019-04-26 PROCEDURE — 80307 DRUG TEST PRSMV CHEM ANLYZR: CPT

## 2019-04-26 PROCEDURE — 90853 GROUP PSYCHOTHERAPY: CPT | Mod: ,,, | Performed by: PSYCHOLOGIST

## 2019-04-26 PROCEDURE — 99231 PR SUBSEQUENT HOSPITAL CARE,LEVL I: ICD-10-PCS | Mod: ,,, | Performed by: PSYCHIATRY & NEUROLOGY

## 2019-04-26 PROCEDURE — 90853 PR GROUP PSYCHOTHERAPY: ICD-10-PCS | Mod: ,,, | Performed by: PSYCHOLOGIST

## 2019-04-26 PROCEDURE — 90853 GROUP PSYCHOTHERAPY: CPT | Performed by: SOCIAL WORKER

## 2019-04-26 PROCEDURE — 90853 GROUP PSYCHOTHERAPY: CPT

## 2019-04-26 NOTE — PLAN OF CARE
04/26/19 1300   Activity/Group Therapy Checklist   Group Activity  (Stress Maps Continued)   Attendance Attended   Follows Direction Followed directions   Group Interactions/Observations Interacted appropriately   Affect/Mood Range Normal range   Affect/Mood Display Appropriate   Goal Progression Progressing

## 2019-04-26 NOTE — PROGRESS NOTES
"4/26/2019 8:43 AM  Donald Warren Abadie  1955  [id number]    [facility] Intensive Outpatient Program Progress Note    STATUS:  Intensive Outpatient Program (IOP)    SUBJECTIVE:   Chief Complaint: substance abuse    History of Present Illness:   Donald Warren Abadie is a 63 y.o. male with a past psychiatric history of anxiety, unspecified , who presented to [facility] IOP due to alcohol abuse.     Patient was recently seen by Psychiatry CL service after a recent hospital stay, per their note 3/27/19:  "As per the patient and the daughter, the patient has been drinking since he was 14 years old. Pt began drinking heavily after retiring five years ago and as per the daughter, it has worsened over the past year. During the past two months he has left the house only to get more alcohol and has not been participating in his normal activities. The patient says that he was drinking about 18 drinks per day. Attempted sobriety for the first time 8 weeks ago and experienced tremors and withdrawal symptoms (denies DTs) and resumed drinking. The patient states that he tried quitting alcohol 3.5 weeks ago because he was "feeling bad" and also accidentally overdosed on his BB and flecainide to feel better, which resulted in his [facility] admission. He endorsed sx of EtOH withdrawal on initial presentation, including anxiety, diaphoresis, hallucinations, and hallucinations.      The daughter believes that he is drinking more because he has nothing to occupy since retiring. She also thinks that his anxiety and depression have gotten worse and he uses drinking as a coping mechanism. The patient has had anxiety all his life, but it has worsened over the past few years. Anxiety is worse at night and reports poor sleep as a result. His depression has also worsened. He has tried Ativan in the past but was taken off of it because, as daughter states, he was addicted to it. He has also tried Effexor, Lexapro and Cymbalta. The daughter " "states that he was never on them long enough for them to work. She saw a difference with Cymbalta but thinks he stopped taking it because of the side effects. "    =================================    - Patient confirmed psych hx in above report. Was admitted to the hospital with extensive ICU stay from 3/15/19-3/29/19, was admitted following an accidental overdose of HTN medications, per family alcohol involved with the incident and patient showed signs of alcohol withdrawal when admitted.     Pleasant, linear throughout interview. Says only was drinking beer, confirms 18 beers per dayno liquor or wine. Says once he had no work obligations he started drinking all day (vs drinks after work). Drank 12 oz bud lights. Last drink was 3/15/19 after which patient was admitted to hospital (see above). Cites "dying twice" in the ICU as a wake up call - "I don't want to die!". Confirmed withdrawal symptoms previously reported when he tried to quit - says the first time stopped 2 weeks cold turkey (longest period sobriety since 14 y/o), second time was one week also with withdrawal symptoms. Says both times eventually the withdrawal symptoms went away but took about a week. Denies any seizures at that time, denies hx seizures. Says has been drinking daily since 14 y/o but denies any issues with work - "I never missed a day of work". Says after residential is when he started to acknowledge possibility of a problem "you better slow down". Denies alcohol related social problems. Cites medical problems and ICU stay as main wake-up calls.     Reports ongoing anxiety worse at night, says often affects his sleep. Wide ranging anxiety, says he can wake from sleep anxious and be unable to return to sleep. Started Remeron since discharge with planned increase to 15 mg soon. Feels it is helping with sleep "for the most part" and thinks he has some daytime benefits as well regarding anxiety. Denies mood issues. Relates to residential, says " "when he was working he was "wore out" at home and didn't have trouble with generalized anxiety or insomnia. Denies any hx SI/HI. During aforementioned withdrawals denies any periods of disorientation or AVH, though collateral reports indicate likely hx DTs. Does acknowledge he does not remember much of ICU stay (chart review with possible AVH). Denies illicit drug use, denies hx addiction issues with illicits.    Denies recent mood issues. Reports sleep and anxiety has improved since starting Remeron. Otherwise pan negative on SIGECAPS since recent hospital discharge.     =====================    INTERVAL HX    4/15/19  Patient doing well today. Had a good weekend, worked on pool this weekend. Mood stable and good. Eating and sleeping well at home. Reports increased appetite with Remeron. Denies other side effects. Denies SI/HI/AVH. Afternoon AA meetings going well, patient attending daily. Feels he has been surprised he enjoys AA as much as he does. Good insight to benefits of daily attendance even after maintaining sobriety, discussed people at meetings that have been sober ten years. In process of finding a sponsor. Group here going well. Denies cravings. Says anxiety is improving "everything is getting better". Patient with no other questions for MD, and does not have any particular topics he would like to discuss when offered.     Toxicology Screen: Alcohol biomarkers 4/12 negative; Utox 4/12 negative; breath alcohol 4/12 negative   Medication Side Effects: None  Cravings: no  No other acute psychiatric issues reported at this time.    4/17/19  Patient doing well. Reports mood doing good and stable. Says son-in-law came in for family today, explained for privacy reasons son-in-law unable to attend group meetings with other patients, patient voiced understanding. Says main result of family day is patient would like to address his hurt knee sooner rather than later. Picked up rx, plans to trial naltrexone at home " this weekend in case he gets nauseous. AA meetings going well, group here going well. Denies SI/HI/AVH. Plans to address knee as soon as done with program. Denies cravings , does report eating more. Sleeping well at home. Reports night time anxiety continues to improve. Denies medication side effects outside of possible increased appetite. Patient with no other questions for MD, and does not have any particular topics he/she would like to discuss when offered.    Toxicology Screen: Alcohol biomarkers 4/12 negative, 4/15 in process; Utox 4/15 negative; breath alcohol 4/16 negative   Medication Side Effects: increased appetite, though likely also due to sobriety and decreased etoh calories  Cravings: no  No other acute psychiatric issues reported at this time.    4/22/19  Patient doing well today. Reports had a good weekend. Didn't trial naltrexone over the weekend, plans to soon but plans to start by taking a quarter to minimize side effects. Patient had crawfish boil over the weekend, was able to avoid drinking and denies cravings. Denies physical complaints outside of knee pain. Eating and sleeping well. Planning to move remeron dose up to avoid morning grogginess. Denies SI/HI/AVH. AA meetings going well, group here going well. Denies medication side effects otherwise. Patient with no other questions for MD, and does not have any particular topics he/she would like to discuss when offered.    Toxicology Screen: Alcohol biomarkers 4/15 negative; Utox 4/17 negative; breath alcohol 4/18 negative   Medication Side Effects: denied  Cravings: no  No other acute psychiatric issues reported at this time.    4/24/19  Patient doing well, eating well at home. Reports trouble sleeping last night. Says Remeron helps him sleep but feels he may need to take earlier in the evening to time effect, advised that is fine and he can time as he pleases in the evening. Pleasant discussion about jazz fest, knee/foot issues. Reports good  mood and night time anxiety not an issue or keeping him from sleeping, denies daytime anxiety. Denies SI/HI/AVH. Denies medication side effects. Endorses knee pain. AA going well, good insight. Group here going well. Denies cravings, patient says memories of ICU stay keep him from wanting. Patient didn't trail naltrexone over the weekend, will revisit on Friday for possible weekend trial.    Toxicology Screen: Alcohol biomarkers 4/15 negative. 4/22 pending; Utox 4/22 negative; breath alcohol 4/23 negative   Medication Side Effects: denied  Cravings: no  No other acute psychiatric issues reported at this time.    4/26/19  Patient doing well today. Calm, conversational, pleasant. Arrangements for knee surgery after this program ongoing. Sleep continues to improve. Remeron has been helping his sleep and he has been able to take the pill at a time better for him (earlier). Discussed SSRI, patient. Denies SI/HI/AVH. AA meetings going well, group here going well. Denies mood issues or major fluctuations. Denies medication side effects. Knee pain but no other physical complaints. Reports anxiety much improved since admit, denies any night time anxiety that affects sleep. Denies cravings. Patient with no other questions for MD, and does not have any particular topics he/she would like to discuss when offered.    Toxicology Screen: Alcohol biomarkers 4/22 negative; Utox 4/24 negative; breath alcohol 4/25 negative   Medication Side Effects: denied  Cravings: no  No other acute psychiatric issues reported at this time.    Allergies:  No known drug allergies  Medical/Surgical History:  Past Medical History:   Diagnosis Date    A-fib     Alcohol abuse     Arthritis     Chronic gout     Diabetes mellitus     DM (diabetes mellitus) 11/16/2016    Fatty liver     Hyperlipidemia     Renal cell cancer 2014     Past Surgical History:   Procedure Laterality Date    ABSCESS DRAINAGE      perirectal    COLONOSCOPY       FISTULOTOMY N/A 5/19/2015    [facility]    HAND SURGERY Left     HEMORRHOIDECTOMY N/A 5/19/2015    [facility]    KIDNEY SURGERY      partial left kidney removal - [place]    PARTIAL NEPHRECTOMY Left August 2014    ROBOTIC ASSISTED LAPAROSCOPIC NEPHRECTOMY PARTIAL Left 8/28/2014    [facility]     OBJECTIVE:     Current Medications:   Infusions:    Scheduled:    PRN:    Home Medications:  Prior to Admission medications    Medication Sig Start Date End Date Taking? Authorizing Provider   allopurinol (ZYLOPRIM) 100 MG tablet TAKE 2 TABLETS BY MOUTH DAILY 2/22/19   Diana Hemphill MD   apixaban (ELIQUIS) 5 mg Tab Take 1 tablet (5 mg total) by mouth 2 (two) times daily. 3/4/19   Uriel Tolentino MD   aspirin 81 MG Chew Take 1 tablet (81 mg total) by mouth once daily. 3/4/19 3/3/20  Uriel Tolentino MD   cyanocobalamin (VITAMIN B-12) 1000 MCG tablet Take 1 tablet (1,000 mcg total) by mouth once daily. 3/30/19   DEAN Vigil MD   fenofibrate 160 MG Tab Take 1 tablet (160 mg total) by mouth every evening. 10/29/18   Bacilio Larry MD   flecainide (TAMBOCOR) 150 MG Tab Take 1 tablet (150 mg total) by mouth 2 (two) times daily. 3/7/19 4/9/19  Uriel Tolentino MD   folic acid (FOLVITE) 1 MG tablet Take 1 tablet (1 mg total) by mouth once daily. 3/30/19 3/29/20  DEAN Vigil MD   metoprolol tartrate (LOPRESSOR) 25 MG tablet NOT CURRENTLY TAKING Take 0.5 tablets (12.5 mg total) by mouth 2 (two) times daily. 3/29/19 3/28/20  DEAN Vigil MD   mirtazapine (REMERON) 15 MG tablet Take 1 tablet (15 mg total) by mouth every evening. 4/9/19 4/8/20  Bacilio Larry MD   multivitamin (THERAGRAN) tablet Take 1 tablet by mouth once daily. 3/30/19   DEAN Vigil MD   omega-3 acid ethyl esters (LOVAZA) 1 gram capsule Take 2 g by mouth 2 (two) times daily.    Historical Provider,  "MD   pantoprazole (PROTONIX) 40 MG tablet Take 1 tablet (40 mg total) by mouth once daily. 3/30/19 3/29/20  DEAN Vigil MD   ranitidine (ZANTAC) 150 MG capsule Take 150 mg by mouth 2 (two) times daily.    Historical Provider, MD   rosuvastatin (CRESTOR) 10 MG tablet Take 1 tablet (10 mg total) by mouth once daily. 3/4/19 3/3/20  Uriel Tolentino MD   thiamine 100 MG tablet Take 1 tablet (100 mg total) by mouth once daily. 3/30/19   DEAN Vigil MD     NOT CURRENTLY TAKING METOPROLOL    Vital Signs:  There were no vitals filed for this visit.      Mental Status Exam:  Appearance: unremarkable, age appropriate, casually dressed, good grooming  Level of Consciousness: alert  Behavior/Cooperation: friendly and cooperative  Psychomotor: unremarkable   Speech: normal tone, normal rate, normal pitch, normal volume, accented  Language: english, fluid  Orientation: grossly oriented  Attention Span/Concentration: intact  Memory: grossly intact  Mood: "good"  Affect: normal  Thought Process: linear, normal and logical  Associations: normal and logical  Thought Content: normal, no suicidality, no homicidality, delusions, or paranoia  Fund of Knowledge: Aware of current events   Abstraction: intact to conversation  Insight: fair  Judgment: fair    Labs/Imaging/Studies:   Recent Results (from the past 24 hour(s))   POCT BREATH ALCOHOL TEST    Collection Time: 04/25/19 11:00 AM   Result Value Ref Range    Breath Alcohol 0.000       ASSESSMENT     Donald Warren Abadie is a 63 y.o. male with a past psychiatric history of anxiety, who presented to [facility] due to alcoho abuse. 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 Patient and family describe worsening alcohol intake leading to significant social isolation. Patient with history legal complications (DWI) from alcohol use. Patient very recently admitted for an extensive ICU stay after accidentally " overdosing on HTN medications while intoxicated from alcohol use. At presentation to the ER patient was noted to have multiple alcohol withdrawal symptoms as well reports from family consistent with Delirium Tremens. Patient is also high risk for mood disorder if he were to relapse, with multiple static factors (, older white male with medical comorbidities and hx substance abuse) that would contribute to risk of completed suicide.    IMPRESSION  - alcohol use disorder, severe  - anxiety, unspecified    PLAN     · Start patient on ABU protocol.  · Breathalyzer daily and urine toxicology at least three times per week.  · VS daily x 3 days.  · CBC and CMP unremarkable, LFTs WNL  · Patient counseled on abstinence from alcohol.    Medications: Continue current medications.  · Continue Remeron 15 mg PO nightly for insomnia and anxiety. Patient reports good effect with no reported troublesome side effects.  · Discussed possibility of SSRI or other antidepressant for anxiety with patient at admit. He is open to the possibility but patient and MD agreed to revisit this option as he progresses in program - at admit had recent  increase in Remeron dosage (7.5 to 15 nightly) and patient feels he is sleeping well with anxiety lessened and restricted to night time only.  Revisited SSRI 4/26/19, patient feels anxiety well controlled and would like to defer any additional tx (to remeron).  · Plan to trial naltrexone  · Medications for alcohol use disorder were discussed including acamprosate, naltrexone and disulfiram.     Status: Continue treatment on ABU

## 2019-04-27 NOTE — PROGRESS NOTES
Group Psychotherapy (PhD/LCSW)    Site: [facility]    Clinical status of patient: Intensive Outpatient Program (IOP)    Date: 4/26/2019    Group Focus: Psychodynamic Group Psychotherapy    Length of service: [id number] - 45-50 minutes    Number of patients in attendance: 5    Referred by: Addictive Behavior Unit Treatment Team    Target symptoms: Alcohol Abuse    Patient's response to treatment: Active Listening and Self-disclosure    Progress toward goals: Progressing adequately    Interval History: Discussed how much physically better and mentally clearer he feels in sobriety. Discussed the way his daughter is keeping an eye on him (which he appreciates).    Diagnosis: alcohol use disorder, severe, dependence    Plan: Continue treatment on ABU

## 2019-04-29 ENCOUNTER — HOSPITAL ENCOUNTER (OUTPATIENT)
Dept: PSYCHIATRY | Facility: HOSPITAL | Age: 64
Discharge: HOME | End: 2019-04-29
Attending: PSYCHIATRY & NEUROLOGY
Payer: COMMERCIAL

## 2019-04-29 VITALS — DIASTOLIC BLOOD PRESSURE: 68 MMHG | HEART RATE: 67 BPM | SYSTOLIC BLOOD PRESSURE: 113 MMHG | RESPIRATION RATE: 16 BRPM

## 2019-04-29 DIAGNOSIS — F10.931 ALCOHOL WITHDRAWAL SYNDROME, WITH DELIRIUM: ICD-10-CM

## 2019-04-29 DIAGNOSIS — F10.20 ALCOHOL USE DISORDER, SEVERE, IN CONTROLLED ENVIRONMENT: ICD-10-CM

## 2019-04-29 DIAGNOSIS — F10.20 ALCOHOL USE DISORDER, SEVERE, DEPENDENCE: Primary | ICD-10-CM

## 2019-04-29 LAB
AMPHET+METHAMPHET UR QL: NEGATIVE
BARBITURATES UR QL SCN>200 NG/ML: NEGATIVE
BENZODIAZ UR QL SCN>200 NG/ML: NEGATIVE
BREATH ALCOHOL: 0
BZE UR QL SCN: NEGATIVE
CANNABINOIDS UR QL SCN: NEGATIVE
CREAT UR-MCNC: 24 MG/DL (ref 23–375)
ETHANOL UR-MCNC: <10 MG/DL
METHADONE UR QL SCN>300 NG/ML: NEGATIVE
OPIATES UR QL SCN: NEGATIVE
PCP UR QL SCN>25 NG/ML: NEGATIVE
TOXICOLOGY INFORMATION: NORMAL

## 2019-04-29 PROCEDURE — 90853 GROUP PSYCHOTHERAPY: CPT

## 2019-04-29 PROCEDURE — 80307 DRUG TEST PRSMV CHEM ANLYZR: CPT

## 2019-04-29 PROCEDURE — 99232 SBSQ HOSP IP/OBS MODERATE 35: CPT | Mod: ,,, | Performed by: PSYCHIATRY & NEUROLOGY

## 2019-04-29 PROCEDURE — 90853 GROUP PSYCHOTHERAPY: CPT | Performed by: SOCIAL WORKER

## 2019-04-29 PROCEDURE — 90853 PR GROUP PSYCHOTHERAPY: ICD-10-PCS | Mod: 59,,, | Performed by: PSYCHOLOGIST

## 2019-04-29 PROCEDURE — 99232 PR SUBSEQUENT HOSPITAL CARE,LEVL II: ICD-10-PCS | Mod: ,,, | Performed by: PSYCHIATRY & NEUROLOGY

## 2019-04-29 PROCEDURE — 90853 GROUP PSYCHOTHERAPY: CPT | Mod: 59,,, | Performed by: PSYCHOLOGIST

## 2019-04-29 NOTE — PLAN OF CARE
04/29/19 1400   Activity/Group Therapy Checklist   Group Educational   Attendance Attended   Follows Direction Followed directions   Group Interactions/Observations Interacted appropriately;Alert;Poor Concentration  (Minimal sharing. had difficulty of concept of dry drunk.. [place])   Affect/Mood Range Normal range   Affect/Mood Display Appropriate   Goal Progression Progressing

## 2019-04-29 NOTE — PROGRESS NOTES
"4/29/2019 8:43 AM  Donald Warren Abadie  1955  [id number]    [facility] Intensive Outpatient Program Progress Note    STATUS:  Intensive Outpatient Program (IOP)    SUBJECTIVE:   Chief Complaint: substance abuse    History of Present Illness:   Donald Warren Abadie is a 63 y.o. male with a past psychiatric history of anxiety, unspecified , who presented to ABU IOP due to alcohol abuse.     Patient was recently seen by Psychiatry CL service after a recent hospital stay, per their note 3/27/19:  "As per the patient and the daughter, the patient has been drinking since he was 14 years old. Pt began drinking heavily after retiring five years ago and as per the daughter, it has worsened over the past year. During the past two months he has left the house only to get more alcohol and has not been participating in his normal activities. The patient says that he was drinking about 18 drinks per day. Attempted sobriety for the first time 8 weeks ago and experienced tremors and withdrawal symptoms (denies DTs) and resumed drinking. The patient states that he tried quitting alcohol 3.5 weeks ago because he was "feeling bad" and also accidentally overdosed on his BB and flecainide to feel better, which resulted in his [facility] admission. He endorsed sx of EtOH withdrawal on initial presentation, including anxiety, diaphoresis, hallucinations, and hallucinations.      The daughter believes that he is drinking more because he has nothing to occupy since retiring. She also thinks that his anxiety and depression have gotten worse and he uses drinking as a coping mechanism. The patient has had anxiety all his life, but it has worsened over the past few years. Anxiety is worse at night and reports poor sleep as a result. His depression has also worsened. He has tried Ativan in the past but was taken off of it because, as daughter states, he was addicted to it. He has also tried Effexor, Lexapro and Cymbalta. The daughter " "states that he was never on them long enough for them to work. She saw a difference with Cymbalta but thinks he stopped taking it because of the side effects. "    =================================    - Patient confirmed psych hx in above report. Was admitted to the hospital with extensive ICU stay from 3/15/19-3/29/19, was admitted following an accidental overdose of HTN medications, per family alcohol involved with the incident and patient showed signs of alcohol withdrawal when admitted.     Pleasant, linear throughout interview. Says only was drinking beer, confirms 18 beers per dayno liquor or wine. Says once he had no work obligations he started drinking all day (vs drinks after work). Drank 12 oz bud lights. Last drink was 3/15/19 after which patient was admitted to hospital (see above). Cites "dying twice" in the ICU as a wake up call - "I don't want to die!". Confirmed withdrawal symptoms previously reported when he tried to quit - says the first time stopped 2 weeks cold turkey (longest period sobriety since 14 y/o), second time was one week also with withdrawal symptoms. Says both times eventually the withdrawal symptoms went away but took about a week. Denies any seizures at that time, denies hx seizures. Says has been drinking daily since 14 y/o but denies any issues with work - "I never missed a day of work". Says after long term is when he started to acknowledge possibility of a problem "you better slow down". Denies alcohol related social problems. Cites medical problems and ICU stay as main wake-up calls.     Reports ongoing anxiety worse at night, says often affects his sleep. Wide ranging anxiety, says he can wake from sleep anxious and be unable to return to sleep. Started Remeron since discharge with planned increase to 15 mg soon. Feels it is helping with sleep "for the most part" and thinks he has some daytime benefits as well regarding anxiety. Denies mood issues. Relates to long term, says " "when he was working he was "wore out" at home and didn't have trouble with generalized anxiety or insomnia. Denies any hx SI/HI. During aforementioned withdrawals denies any periods of disorientation or AVH, though collateral reports indicate likely hx DTs. Does acknowledge he does not remember much of ICU stay (chart review with possible AVH). Denies illicit drug use, denies hx addiction issues with illicits.    Denies recent mood issues. Reports sleep and anxiety has improved since starting Remeron. Otherwise pan negative on SIGECAPS since recent hospital discharge.     =====================    INTERVAL HX    4/15/19  Patient doing well today. Had a good weekend, worked on pool this weekend. Mood stable and good. Eating and sleeping well at home. Reports increased appetite with Remeron. Denies other side effects. Denies SI/HI/AVH. Afternoon AA meetings going well, patient attending daily. Feels he has been surprised he enjoys AA as much as he does. Good insight to benefits of daily attendance even after maintaining sobriety, discussed people at meetings that have been sober ten years. In process of finding a sponsor. Group here going well. Denies cravings. Says anxiety is improving "everything is getting better". Patient with no other questions for MD, and does not have any particular topics he would like to discuss when offered.     Toxicology Screen: Alcohol biomarkers 4/12 negative; Utox 4/12 negative; breath alcohol 4/12 negative   Medication Side Effects: None  Cravings: no  No other acute psychiatric issues reported at this time.    4/17/19  Patient doing well. Reports mood doing good and stable. Says son-in-law came in for family today, explained for privacy reasons son-in-law unable to attend group meetings with other patients, patient voiced understanding. Says main result of family day is patient would like to address his hurt knee sooner rather than later. Picked up rx, plans to trial naltrexone at home " this weekend in case he gets nauseous. AA meetings going well, group here going well. Denies SI/HI/AVH. Plans to address knee as soon as done with program. Denies cravings , does report eating more. Sleeping well at home. Reports night time anxiety continues to improve. Denies medication side effects outside of possible increased appetite. Patient with no other questions for MD, and does not have any particular topics he/she would like to discuss when offered.    Toxicology Screen: Alcohol biomarkers 4/12 negative, 4/15 in process; Utox 4/15 negative; breath alcohol 4/16 negative   Medication Side Effects: increased appetite, though likely also due to sobriety and decreased etoh calories  Cravings: no  No other acute psychiatric issues reported at this time.    4/22/19  Patient doing well today. Reports had a good weekend. Didn't trial naltrexone over the weekend, plans to soon but plans to start by taking a quarter to minimize side effects. Patient had crawfish boil over the weekend, was able to avoid drinking and denies cravings. Denies physical complaints outside of knee pain. Eating and sleeping well. Planning to move remeron dose up to avoid morning grogginess. Denies SI/HI/AVH. AA meetings going well, group here going well. Denies medication side effects otherwise. Patient with no other questions for MD, and does not have any particular topics he/she would like to discuss when offered.    Toxicology Screen: Alcohol biomarkers 4/15 negative; Utox 4/17 negative; breath alcohol 4/18 negative   Medication Side Effects: denied  Cravings: no  No other acute psychiatric issues reported at this time.    4/24/19  Patient doing well, eating well at home. Reports trouble sleeping last night. Says Remeron helps him sleep but feels he may need to take earlier in the evening to time effect, advised that is fine and he can time as he pleases in the evening. Pleasant discussion about jazz fest, knee/foot issues. Reports good  mood and night time anxiety not an issue or keeping him from sleeping, denies daytime anxiety. Denies SI/HI/AVH. Denies medication side effects. Endorses knee pain. AA going well, good insight. Group here going well. Denies cravings, patient says memories of ICU stay keep him from wanting. Patient didn't trail naltrexone over the weekend, will revisit on Friday for possible weekend trial.    Toxicology Screen: Alcohol biomarkers 4/15 negative. 4/22 pending; Utox 4/22 negative; breath alcohol 4/23 negative   Medication Side Effects: denied  Cravings: no  No other acute psychiatric issues reported at this time.    4/26/19  Patient doing well today. Calm, conversational, pleasant. Arrangements for knee surgery after this program ongoing. Sleep continues to improve. Remeron has been helping his sleep and he has been able to take the pill at a time better for him (earlier). Discussed SSRI, patient. Denies SI/HI/AVH. AA meetings going well, group here going well. Denies mood issues or major fluctuations. Denies medication side effects. Knee pain but no other physical complaints. Reports anxiety much improved since admit, denies any night time anxiety that affects sleep. Denies cravings. Patient with no other questions for MD, and does not have any particular topics he/she would like to discuss when offered.    Toxicology Screen: Alcohol biomarkers 4/22 negative; Utox 4/24 negative; breath alcohol 4/25 negative   Medication Side Effects: denied  Cravings: no  No other acute psychiatric issues reported at this time.    4/29/19  Patient doing well today. Calm, conversational, pleasant. Patient reports he has been eating more sweets, had BG at home in 400s. Says last was ~200 when measured, will measure at home. Hadn't checked in a couple weeks, has discussed this with his PCP (Dr. Larry) in past and has an appointment with him in the next two weeks. Denies any physical symptoms symptoms during this. Endorses good and stable  mood. Denies SI/HI/AVH. Remeron continues to be helpful, taking it earlier to avoid daytime grogginess. Concern for appetite effect as above, will continue to monitor. Reports anxiety continues to be improved, with little during daytime or night (vs staying up with anxiety previously). No alcohol cravings, but reports cravings for sweets. AA meetings going well, group here going well.     Toxicology Screen: Alcohol biomarkers 4/22 negative 4/29 in process; Utox 4/26 negative 4/29 in process; breath alcohol 4/29 negative   Medication Side Effects: denied  Cravings: no  No other acute psychiatric issues reported at this time.    Allergies:  No known drug allergies  Medical/Surgical History:  Past Medical History:   Diagnosis Date    A-fib     Alcohol abuse     Arthritis     Chronic gout     Diabetes mellitus     DM (diabetes mellitus) 11/16/2016    Fatty liver     Hyperlipidemia     Renal cell cancer 2014     Past Surgical History:   Procedure Laterality Date    ABSCESS DRAINAGE      perirectal    COLONOSCOPY      FISTULOTOMY N/A 5/19/2015    Performed by Shane Hughes MD at [facility] OR [address] [place] [address]    HAND SURGERY Left     HEMORRHOIDECTOMY N/A 5/19/2015    Performed by Shane Hughes MD at [facility] OR [address] [place] [address]    KIDNEY SURGERY      partial left kidney removal - [place]    PARTIAL NEPHRECTOMY Left August 2014    ROBOTIC ASSISTED LAPAROSCOPIC NEPHRECTOMY PARTIAL Left 8/28/2014    Performed by Fabian Estevez MD at [facility] OR [address] [place] [address]     OBJECTIVE:     Current Medications:   Infusions:    Scheduled:    PRN:    Home Medications:  Prior to Admission medications    Medication Sig Start Date End Date Taking? Authorizing Provider   allopurinol (ZYLOPRIM) 100 MG tablet TAKE 2 TABLETS BY MOUTH DAILY 2/22/19   Diana Hemphill MD   apixaban (ELIQUIS) 5 mg Tab Take 1 tablet (5 mg total) by mouth 2 (two) times daily. 3/4/19   Uriel Tolentino MD   aspirin 81 MG Chew Take 1 tablet (81 mg total) by mouth once  daily. 3/4/19 3/3/20  Uriel Tolentino MD   cyanocobalamin (VITAMIN B-12) 1000 MCG tablet Take 1 tablet (1,000 mcg total) by mouth once daily. 3/30/19   DEAN Vigil MD   fenofibrate 160 MG Tab Take 1 tablet (160 mg total) by mouth every evening. 10/29/18   Bacilio Larry MD   flecainide (TAMBOCOR) 150 MG Tab Take 1 tablet (150 mg total) by mouth 2 (two) times daily. 3/7/19 4/9/19  Uriel Tolentino MD   folic acid (FOLVITE) 1 MG tablet Take 1 tablet (1 mg total) by mouth once daily. 3/30/19 3/29/20  DEAN Vigil MD   metoprolol tartrate (LOPRESSOR) 25 MG tablet NOT CURRENTLY TAKING Take 0.5 tablets (12.5 mg total) by mouth 2 (two) times daily. 3/29/19 3/28/20  DEAN Vigil MD   mirtazapine (REMERON) 15 MG tablet Take 1 tablet (15 mg total) by mouth every evening. 4/9/19 4/8/20  Bacilio Larry MD   multivitamin (THERAGRAN) tablet Take 1 tablet by mouth once daily. 3/30/19   DEAN Vigil MD   omega-3 acid ethyl esters (LOVAZA) 1 gram capsule Take 2 g by mouth 2 (two) times daily.    Historical Provider, MD   pantoprazole (PROTONIX) 40 MG tablet Take 1 tablet (40 mg total) by mouth once daily. 3/30/19 3/29/20  DEAN Vigil MD   ranitidine (ZANTAC) 150 MG capsule Take 150 mg by mouth 2 (two) times daily.    Historical Provider, MD   rosuvastatin (CRESTOR) 10 MG tablet Take 1 tablet (10 mg total) by mouth once daily. 3/4/19 3/3/20  Uriel Tolentino MD   thiamine 100 MG tablet Take 1 tablet (100 mg total) by mouth once daily. 3/30/19   DEAN Vigil MD     NOT CURRENTLY TAKING METOPROLOL    Vital Signs:  Vitals:    04/29/19 1056   BP: 113/68   Pulse: 67   Resp: 16         Mental Status Exam:  Appearance: unremarkable, age appropriate, casually dressed, good grooming  Level of Consciousness: alert  Behavior/Cooperation: friendly and cooperative  Psychomotor: unremarkable   Speech: normal tone, normal rate, normal pitch, normal volume, accented  Language: english, fluid  Orientation:  "grossly oriented  Attention Span/Concentration: intact  Memory: grossly intact  Mood: "good"  Affect: normal  Thought Process: linear, normal and logical  Associations: normal and logical  Thought Content: normal, no suicidality, no homicidality, delusions, or paranoia  Fund of Knowledge: Aware of current events   Abstraction: intact to conversation  Insight: fair  Judgment: fair    Labs/Imaging/Studies:   Recent Results (from the past 24 hour(s))   POCT BREATH ALCOHOL TEST    Collection Time: 04/29/19 10:58 AM   Result Value Ref Range    Breath Alcohol 0.000       ASSESSMENT     Donald Warren Abadie is a 63 y.o. male with a past psychiatric history of anxiety, who presented to ABU IOP due to alcoho abuse. 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 Patient and family describe worsening alcohol intake leading to significant social isolation. Patient with history legal complications (DWI) from alcohol use. Patient very recently admitted for an extensive ICU stay after accidentally overdosing on HTN medications while intoxicated from alcohol use. At presentation to the ER patient was noted to have multiple alcohol withdrawal symptoms as well reports from family consistent with Delirium Tremens. Patient is also high risk for mood disorder if he were to relapse, with multiple static factors (, older white male with medical comorbidities and hx substance abuse) that would contribute to risk of completed suicide.    IMPRESSION  - alcohol use disorder, severe  - anxiety, unspecified    PLAN     · Start patient on ABU protocol.  · Breathalyzer daily and urine toxicology at least three times per week.  · VS daily x 3 days.  · CBC and CMP unremarkable, LFTs WNL  · Patient counseled on abstinence from alcohol.    Medications: Continue current medications.  · Continue Remeron 15 mg PO nightly for insomnia and anxiety. Patient reports good effect " with no reported troublesome side effects.  · Discussed possibility of SSRI or other antidepressant for anxiety with patient at admit. He is open to the possibility but patient and MD agreed to revisit this option as he progresses in program - at admit had recent  increase in Remeron dosage (7.5 to 15 nightly) and patient feels he is sleeping well with anxiety lessened and restricted to night time only.  Revisited SSRI 4/26/19, patient feels anxiety well controlled and would like to defer any additional tx (to remeron). Revisited again 4/29 due to reports increase appetite, discussed possibility of switching to medicine without appetite stimulating effects in future if continues to be an issue. Patient amenable but would like to continue Remeron if possible as he feels has been beneficial.  · Plan to trial naltrexone  · Medications for alcohol use disorder were discussed including acamprosate, naltrexone and disulfiram.     Status: Continue treatment on ABU

## 2019-04-29 NOTE — PLAN OF CARE
04/29/19 1000   Activity/Group Therapy Checklist   Group Educational  (family roles )   Attendance Attended   Follows Direction Followed directions   Group Interactions/Observations Interacted appropriately   Affect/Mood Range Normal range   Affect/Mood Display Appropriate   Goal Progression Progressing

## 2019-04-29 NOTE — PROGRESS NOTES
Group Psychotherapy (PhD/LCSW)    Site: [facility]    Clinical status of patient: Intensive Outpatient Program (IOP)    Date: 4/29/2019    Group Focus: Communication Skills       Length of service: 69591 - 45-50 minutes    Number of patients in attendance: 5    Referred by: [facility] Treatment Team    Target symptoms: Alcohol Abuse    Patient's response to treatment: Active Listening      Progress toward goals: Progressing adequately    Interval History:  Discussed basic communication skills: I-messages and Reflective Listening. Illustrated how to formulate I-messages and reflective responses through modeling and role play. Noted the way these skills enhance the communication process.      Diagnosis: alcohol use disorder, severe, dependence    Plan: Continue treatment on ABU

## 2019-04-29 NOTE — PROGRESS NOTES
Group Psychotherapy (PhD/LCSW)    Site: [facility]    Clinical status of patient: Intensive Outpatient Program (IOP)    Date: 4/29/2019    Group Focus: Psychodynamic Group Psychotherapy    Length of service: 67965 - 45-50 minutes    Number of patients in attendance: 5    Referred by: [facility] Behavior Unit Treatment Team    Target symptoms: Alcohol Abuse    Patient's response to treatment: Active Listening and Self-disclosure    Progress toward goals: Progressing adequately    Interval History: Pt shared his story with a new member of the group.     Diagnosis: alcohol use disorder, severe, dependence    Plan: Continue treatment on ABU

## 2019-04-30 ENCOUNTER — HOSPITAL ENCOUNTER (OUTPATIENT)
Dept: PSYCHIATRY | Facility: HOSPITAL | Age: 64
Discharge: HOME | End: 2019-04-30
Attending: PSYCHIATRY & NEUROLOGY
Payer: COMMERCIAL

## 2019-04-30 DIAGNOSIS — F10.20 ALCOHOL USE DISORDER, SEVERE, IN CONTROLLED ENVIRONMENT: ICD-10-CM

## 2019-04-30 DIAGNOSIS — F10.20 ALCOHOL USE DISORDER, SEVERE, DEPENDENCE: Primary | ICD-10-CM

## 2019-04-30 DIAGNOSIS — F10.931 ALCOHOL WITHDRAWAL SYNDROME, WITH DELIRIUM: ICD-10-CM

## 2019-04-30 LAB — BREATH ALCOHOL: 0

## 2019-04-30 PROCEDURE — 90853 GROUP PSYCHOTHERAPY: CPT | Mod: ,,, | Performed by: PSYCHOLOGIST

## 2019-04-30 PROCEDURE — 90853 GROUP PSYCHOTHERAPY: CPT

## 2019-04-30 PROCEDURE — 90853 PR GROUP PSYCHOTHERAPY: ICD-10-PCS | Mod: ,,, | Performed by: PSYCHOLOGIST

## 2019-04-30 PROCEDURE — 90853 GROUP PSYCHOTHERAPY: CPT | Performed by: SOCIAL WORKER

## 2019-04-30 RX ORDER — TRAZODONE HYDROCHLORIDE 50 MG/1
50-100 TABLET ORAL NIGHTLY
Qty: 60 TABLET | Refills: 1 | Status: SHIPPED | OUTPATIENT
Start: 2019-04-30 | End: 2019-10-07

## 2019-04-30 NOTE — PROGRESS NOTES
Group Psychotherapy (PhD/LCSW)    Site: [facility]    Clinical status of patient: Intensive Outpatient Program (IOP)    Date: 4/30/2019    Group Focus: Disease Model of Addiction    Length of service: 64319 - 45-50 minutes    Number of patients in attendance: 6    Referred by: [facility] Behavior Unit Treatment Team    Target symptoms: Alcohol Abuse    Patient's response to treatment: Active Listening-; Self-disclosure    Progress toward goals: Progressing adequately    Interval History: Discussed the basic neuropsychological concepts of the Disease Model of Addiction and how they relate to the phenomena of addiction and recovery (eg, powerlessness, cravings, euphoric recall, cravings, tolerance, relapse, etc). Noted the way the Disease Model comports with 12-step principles. Emphasized the value of understanding the Disease Model for sustaining long-term sobriety.     Diagnosis: alcohol use disorder, severe, dependence,     Plan: Continue treatment on ABU

## 2019-04-30 NOTE — PROGRESS NOTES
Group Psychotherapy (PhD/LCSW)    Site: [facility]    Clinical status of patient: Intensive Outpatient Program (IOP)    Date: 4/30/2019    Group Focus: Psychodynamic Group Psychotherapy    Length of service: 18064 - 45-50 minutes    Number of patients in attendance: 6    Referred by: Add[facility] Behavior Unit Treatment Team    Target symptoms: Alcohol Abuse    Patient's response to treatment: Active Listening and Self-disclosure    Progress toward goals: Progressing adequately    Interval History: Shared his story with new group member.    Diagnosis: alcohol use disorder, severe, dependence    Plan: Continue treatment on ABU

## 2019-04-30 NOTE — PROGRESS NOTES
Group Psychotherapy (PhD/LCSW)    Site: [facility]    Clinical status of patient: Intensive Outpatient Program (IOP)    Date: 4/30/2019    Group Focus: DBT-Based Group Therapy    Length of service: 10760 - 45-50 minutes    Number of patients in attendance: 11    Referred by: Add[facility]ive Behavior Unit Treatment Team    Target symptoms: Alcohol Abuse    Patient's response to treatment: Active Listening-; Self-disclosure    Progress toward goals: Progressing adequately    Interval History: Session focus was Distress Tolerance:  Distract with ACCEPTS.  Patients were encouraged to use activities, contributing, comparisons, different emotions, pushing away, other thoughts, and sensations to reduce intensity of distress.  Each patient identified unique ways to use ACCEPTS.    Diagnosis: alcohol use disorder, severe, dependence,     Plan: Continue treatment on ABU

## 2019-04-30 NOTE — PLAN OF CARE
04/30/19 1400   Activity/Group Therapy Checklist   Group Relapse Prevention   Attendance Attended   Follows Direction Followed directions   Group Interactions/Observations Interacted appropriately   Affect/Mood Range Normal range   Affect/Mood Display Appropriate   Goal Progression Progressing

## 2019-05-01 ENCOUNTER — HOSPITAL ENCOUNTER (OUTPATIENT)
Dept: PSYCHIATRY | Facility: HOSPITAL | Age: 64
Discharge: HOME | End: 2019-05-01
Attending: PSYCHIATRY & NEUROLOGY
Payer: COMMERCIAL

## 2019-05-01 VITALS — HEART RATE: 79 BPM | RESPIRATION RATE: 16 BRPM | DIASTOLIC BLOOD PRESSURE: 63 MMHG | SYSTOLIC BLOOD PRESSURE: 110 MMHG

## 2019-05-01 DIAGNOSIS — F10.20 ALCOHOL USE DISORDER, SEVERE, IN CONTROLLED ENVIRONMENT: ICD-10-CM

## 2019-05-01 DIAGNOSIS — F10.931 ALCOHOL WITHDRAWAL SYNDROME, WITH DELIRIUM: ICD-10-CM

## 2019-05-01 DIAGNOSIS — F10.20 ALCOHOL USE DISORDER, SEVERE, DEPENDENCE: Primary | ICD-10-CM

## 2019-05-01 LAB
AMPHET+METHAMPHET UR QL: NEGATIVE
BARBITURATES UR QL SCN>200 NG/ML: NEGATIVE
BENZODIAZ UR QL SCN>200 NG/ML: NEGATIVE
BREATH ALCOHOL: 0
BZE UR QL SCN: NEGATIVE
CANNABINOIDS UR QL SCN: NEGATIVE
CREAT UR-MCNC: 36 MG/DL (ref 23–375)
ETHANOL UR-MCNC: <10 MG/DL
METHADONE UR QL SCN>300 NG/ML: NEGATIVE
OPIATES UR QL SCN: NEGATIVE
PCP UR QL SCN>25 NG/ML: NEGATIVE
TOXICOLOGY INFORMATION: NORMAL

## 2019-05-01 PROCEDURE — 90853 GROUP PSYCHOTHERAPY: CPT | Performed by: SOCIAL WORKER

## 2019-05-01 PROCEDURE — 99232 PR SUBSEQUENT HOSPITAL CARE,LEVL II: ICD-10-PCS | Mod: ,,, | Performed by: PSYCHIATRY & NEUROLOGY

## 2019-05-01 PROCEDURE — 90853 GROUP PSYCHOTHERAPY: CPT | Mod: ,,, | Performed by: PSYCHOLOGIST

## 2019-05-01 PROCEDURE — 99232 SBSQ HOSP IP/OBS MODERATE 35: CPT | Mod: ,,, | Performed by: PSYCHIATRY & NEUROLOGY

## 2019-05-01 PROCEDURE — 90853 GROUP PSYCHOTHERAPY: CPT | Mod: ,,, | Performed by: PSYCHIATRY & NEUROLOGY

## 2019-05-01 PROCEDURE — 80307 DRUG TEST PRSMV CHEM ANLYZR: CPT

## 2019-05-01 PROCEDURE — 90853 GROUP PSYCHOTHERAPY: CPT

## 2019-05-01 PROCEDURE — 90853 PR GROUP PSYCHOTHERAPY: ICD-10-PCS | Mod: ,,, | Performed by: PSYCHOLOGIST

## 2019-05-01 PROCEDURE — 90853 PR GROUP PSYCHOTHERAPY: ICD-10-PCS | Mod: ,,, | Performed by: PSYCHIATRY & NEUROLOGY

## 2019-05-01 NOTE — PROGRESS NOTES
Group Psychotherapy (PhD/LCSW)    Site: [facility]    Clinical status of patient: Intensive Outpatient Program (IOP)    Date: 5/1/2019    Group Focus: Psychodynamic Group Psychotherapy    Length of service: 46445 - 45-50 minutes    Number of patients in attendance: 6    Referred by: Addictive Behavior Unit Treatment Team    Target symptoms: Alcohol Abuse    Patient's response to treatment: Active Listening and Self-disclosure    Progress toward goals: Progressing adequately    Interval History: Discussed his mind slowly getting back to normal after all he has been through.    Diagnosis: alcohol use disorder, severe, dependence    Plan: Continue treatment on ABU

## 2019-05-01 NOTE — PROGRESS NOTES
Group Psychotherapy (PhD/LCSW)    Site: [facility]    Clinical status of patient: Intensive Outpatient Program (IOP)    Date: 5/1/2019    Group Focus: DBT-Based Group Therapy    Length of service: 01569 - 45-50 minutes    Number of patients in attendance: 10    Referred by: Addictive Behavior Unit Treatment Team    Target symptoms: Alcohol Abuse    Patient's response to treatment: Active Listening-; Self-disclosure    Progress toward goals: Progressing adequately    Interval History: Session focus was Interpersonal Effectiveness:  Validation.  Patients were encouraged to focus on validation of others by enhancing their ability to hear, understand, and respect others.    Diagnosis: alcohol use disorder, severe, dependence,     Plan: Continue treatment on ABU

## 2019-05-01 NOTE — PATIENT CARE CONFERENCE
[facility] Staffing:   Alcohol use disorder, severe  Anxiety, unspecified        1. Pt is attending all groups    2. Pt is attending all meetings  3. Pt 's has minimally supportive family  4. Pt has completed spiritual assessment    5. Pt will present life story    6. Pt will present Step One assignment    7. Pt is exploring issues related to relapse  prevention; spirituality; stress management; improved communication skills; assertiveness training; poor self-esteem; disease concepts; cross addictions; and, work related issues    8. D/C date: TBD     Staff discussed pt's denial of the significant impact ETOH abuse had on pt and limited insight. Staffing discussed encouraging pt to attend meetings around home. Staffing discussed pt's limited support outside of immediate family, lack of leisure activities, and lack of commitment to AA. Limited insight into why total abstinence is crucial to health, as well as elevated liver enzymes.        Problem: Alcohol use disorder, severe  Goal: Address in 12 step meetings and group and individual sessions    Objective Measure: participation in groups, self report, length of sobriety, and relapse prevention plan  Time: Prior to discharge    Progress: Pt is attending groups and sessions       Problem: Anxiety, unspecified   Goal: Address in 12 step meetings and group and individual sessions    Objective Measure: participation in groups, self report, length of sobriety, and relapse prevention plan  Time: Prior to discharge    Progress: Pt is attending groups and sessions       Staff members present:   Dr. Tom, Resident  Dr. Chavez, PhD  Patricia Taylor, [facility]  Yonathna Guidry, [facility]  Chano Cortez, [facility]  Crystal Welch RN

## 2019-05-01 NOTE — PLAN OF CARE
05/01/19 1400   Activity/Group Therapy Checklist   Group Educational   Attendance Attended   Follows Direction Followed directions   Group Interactions/Observations Interacted appropriately   Affect/Mood Range Normal range   Affect/Mood Display Appropriate   Goal Progression Progressing

## 2019-05-01 NOTE — PROGRESS NOTES
"5/1/2019 8:43 AM  Donald Warren Abadie  1955  [id number]    [facility] Intensive Outpatient Program Progress Note    STATUS:  Intensive Outpatient Program (IOP)    SUBJECTIVE:   Chief Complaint: substance abuse    History of Present Illness:   Donald Warren Abadie is a 63 y.o. male with a past psychiatric history of anxiety, unspecified , who presented to [facility] IOP due to alcohol abuse.     Patient was recently seen by Psychiatry CL service after a recent hospital stay, per their note 3/27/19:  "As per the patient and the daughter, the patient has been drinking since he was 14 years old. Pt began drinking heavily after retiring five years ago and as per the daughter, it has worsened over the past year. During the past two months he has left the house only to get more alcohol and has not been participating in his normal activities. The patient says that he was drinking about 18 drinks per day. Attempted sobriety for the first time 8 weeks ago and experienced tremors and withdrawal symptoms (denies DTs) and resumed drinking. The patient states that he tried quitting alcohol 3.5 weeks ago because he was "feeling bad" and also accidentally overdosed on his BB and flecainide to feel better, which resulted in his [facility] admission. He endorsed sx of EtOH withdrawal on initial presentation, including anxiety, diaphoresis, hallucinations, and hallucinations.      The daughter believes that he is drinking more because he has nothing to occupy since retiring. She also thinks that his anxiety and depression have gotten worse and he uses drinking as a coping mechanism. The patient has had anxiety all his life, but it has worsened over the past few years. Anxiety is worse at night and reports poor sleep as a result. His depression has also worsened. He has tried Ativan in the past but was taken off of it because, as daughter states, he was addicted to it. He has also tried Effexor, Lexapro and Cymbalta. The daughter " "states that he was never on them long enough for them to work. She saw a difference with Cymbalta but thinks he stopped taking it because of the side effects. "    =================================    - Patient confirmed psych hx in above report. Was admitted to the hospital with extensive ICU stay from 3/15/19-3/29/19, was admitted following an accidental overdose of HTN medications, per family alcohol involved with the incident and patient showed signs of alcohol withdrawal when admitted.     Pleasant, linear throughout interview. Says only was drinking beer, confirms 18 beers per dayno liquor or wine. Says once he had no work obligations he started drinking all day (vs drinks after work). Drank 12 oz bud lights. Last drink was 3/15/19 after which patient was admitted to hospital (see above). Cites "dying twice" in the ICU as a wake up call - "I don't want to die!". Confirmed withdrawal symptoms previously reported when he tried to quit - says the first time stopped 2 weeks cold turkey (longest period sobriety since 14 y/o), second time was one week also with withdrawal symptoms. Says both times eventually the withdrawal symptoms went away but took about a week. Denies any seizures at that time, denies hx seizures. Says has been drinking daily since 14 y/o but denies any issues with work - "I never missed a day of work". Says after senior living is when he started to acknowledge possibility of a problem "you better slow down". Denies alcohol related social problems. Cites medical problems and ICU stay as main wake-up calls.     Reports ongoing anxiety worse at night, says often affects his sleep. Wide ranging anxiety, says he can wake from sleep anxious and be unable to return to sleep. Started Remeron since discharge with planned increase to 15 mg soon. Feels it is helping with sleep "for the most part" and thinks he has some daytime benefits as well regarding anxiety. Denies mood issues. Relates to senior living, says " "when he was working he was "wore out" at home and didn't have trouble with generalized anxiety or insomnia. Denies any hx SI/HI. During aforementioned withdrawals denies any periods of disorientation or AVH, though collateral reports indicate likely hx DTs. Does acknowledge he does not remember much of ICU stay (chart review with possible AVH). Denies illicit drug use, denies hx addiction issues with illicits.    Denies recent mood issues. Reports sleep and anxiety has improved since starting Remeron. Otherwise pan negative on SIGECAPS since recent hospital discharge.     =====================    INTERVAL HX    4/15/19  Patient doing well today. Had a good weekend, worked on pool this weekend. Mood stable and good. Eating and sleeping well at home. Reports increased appetite with Remeron. Denies other side effects. Denies SI/HI/AVH. Afternoon AA meetings going well, patient attending daily. Feels he has been surprised he enjoys AA as much as he does. Good insight to benefits of daily attendance even after maintaining sobriety, discussed people at meetings that have been sober ten years. In process of finding a sponsor. Group here going well. Denies cravings. Says anxiety is improving "everything is getting better". Patient with no other questions for MD, and does not have any particular topics he would like to discuss when offered.     Toxicology Screen: Alcohol biomarkers 4/12 negative; Utox 4/12 negative; breath alcohol 4/12 negative   Medication Side Effects: None  Cravings: no  No other acute psychiatric issues reported at this time.    4/17/19  Patient doing well. Reports mood doing good and stable. Says son-in-law came in for family today, explained for privacy reasons son-in-law unable to attend group meetings with other patients, patient voiced understanding. Says main result of family day is patient would like to address his hurt knee sooner rather than later. Picked up rx, plans to trial naltrexone at home " this weekend in case he gets nauseous. AA meetings going well, group here going well. Denies SI/HI/AVH. Plans to address knee as soon as done with program. Denies cravings , does report eating more. Sleeping well at home. Reports night time anxiety continues to improve. Denies medication side effects outside of possible increased appetite. Patient with no other questions for MD, and does not have any particular topics he/she would like to discuss when offered.    Toxicology Screen: Alcohol biomarkers 4/12 negative, 4/15 in process; Utox 4/15 negative; breath alcohol 4/16 negative   Medication Side Effects: increased appetite, though likely also due to sobriety and decreased etoh calories  Cravings: no  No other acute psychiatric issues reported at this time.    4/22/19  Patient doing well today. Reports had a good weekend. Didn't trial naltrexone over the weekend, plans to soon but plans to start by taking a quarter to minimize side effects. Patient had crawfish boil over the weekend, was able to avoid drinking and denies cravings. Denies physical complaints outside of knee pain. Eating and sleeping well. Planning to move remeron dose up to avoid morning grogginess. Denies SI/HI/AVH. AA meetings going well, group here going well. Denies medication side effects otherwise. Patient with no other questions for MD, and does not have any particular topics he/she would like to discuss when offered.    Toxicology Screen: Alcohol biomarkers 4/15 negative; Utox 4/17 negative; breath alcohol 4/18 negative   Medication Side Effects: denied  Cravings: no  No other acute psychiatric issues reported at this time.    4/24/19  Patient doing well, eating well at home. Reports trouble sleeping last night. Says Remeron helps him sleep but feels he may need to take earlier in the evening to time effect, advised that is fine and he can time as he pleases in the evening. Pleasant discussion about jazz fest, knee/foot issues. Reports good  mood and night time anxiety not an issue or keeping him from sleeping, denies daytime anxiety. Denies SI/HI/AVH. Denies medication side effects. Endorses knee pain. AA going well, good insight. Group here going well. Denies cravings, patient says memories of ICU stay keep him from wanting. Patient didn't trail naltrexone over the weekend, will revisit on Friday for possible weekend trial.    Toxicology Screen: Alcohol biomarkers 4/15 negative. 4/22 pending; Utox 4/22 negative; breath alcohol 4/23 negative   Medication Side Effects: denied  Cravings: no  No other acute psychiatric issues reported at this time.    4/26/19  Patient doing well today. Calm, conversational, pleasant. Arrangements for knee surgery after this program ongoing. Sleep continues to improve. Remeron has been helping his sleep and he has been able to take the pill at a time better for him (earlier). Discussed SSRI, patient. Denies SI/HI/AVH. AA meetings going well, group here going well. Denies mood issues or major fluctuations. Denies medication side effects. Knee pain but no other physical complaints. Reports anxiety much improved since admit, denies any night time anxiety that affects sleep. Denies cravings. Patient with no other questions for MD, and does not have any particular topics he/she would like to discuss when offered.    Toxicology Screen: Alcohol biomarkers 4/22 negative; Utox 4/24 negative; breath alcohol 4/25 negative   Medication Side Effects: denied  Cravings: no  No other acute psychiatric issues reported at this time.    4/29/19  Patient doing well today. Calm, conversational, pleasant. Patient reports he has been eating more sweets, had BG at home in 400s. Says last was ~200 when measured, will measure at home. Hadn't checked in a couple weeks, has discussed this with his PCP (Dr. Larry) in past and has an appointment with him in the next two weeks. Denies any physical symptoms symptoms during this. Endorses good and stable  "mood. Denies SI/HI/AVH. Remeron continues to be helpful, taking it earlier to avoid daytime grogginess. Concern for appetite effect as above, will continue to monitor. Reports anxiety continues to be improved, with little during daytime or night (vs staying up with anxiety previously). No alcohol cravings, but reports cravings for sweets. AA meetings going well, group here going well.     Toxicology Screen: Alcohol biomarkers 4/22 negative 4/29 in process; Utox 4/26 negative 4/29 in process; breath alcohol 4/29 negative   Medication Side Effects: denied  Cravings: no  No other acute psychiatric issues reported at this time.    05/01/2019  Medications adjusted by Dr. Elaine yesterday as pt had reported weight gain. Remeron was discontinued and pt was started on trazodone 50 mg for sleep qhs. Pt is calm, cooperative, pleasant and conversational. He states that he is "feeling good" this morning and reports his mood has been good. Pt discusses his blood sugars being high and how he needs to follow up with his PCP; also discussed how he was worried about the increased dose of remeron was impacting his appetite and causing his sugars to rise. He states that Dr. Elaine changed his medication to trazodone and feels that it is working well. He reports sleeping aell and denies any grogginess, and expresses that he. He states that his anxiety is doing well and has no complaints. Denies grogginess, feels that trazodone has been helpful.  Denies alcohol cravings, AA meetings going well, groups going well. Denies SI/HI.     Toxicology Screen: Alcohol biomarkers 4/22 negative 4/29 in process; Utox 4/26 negative 4/29 neg; breath alcohol 4/29 negative   Medication Side Effects: denied  Cravings: no  No other acute psychiatric issues reported at this time    Allergies:  No known drug allergies  Medical/Surgical History:  Past Medical History:   Diagnosis Date    A-fib     Alcohol abuse     Arthritis     Chronic gout     " Diabetes mellitus     DM (diabetes mellitus) 11/16/2016    Fatty liver     Hyperlipidemia     Renal cell cancer 2014     Past Surgical History:   Procedure Laterality Date    ABSCESS DRAINAGE      perirectal    COLONOSCOPY      FISTULOTOMY N/A 5/19/2015    Performed by Shane Hughes MD at [facility] OR [address] [place] [address]    HAND SURGERY Left     HEMORRHOIDECTOMY N/A 5/19/2015    Performed by Shane Hughes MD at [facility] OR [address] [place] [address]    KIDNEY SURGERY      partial left kidney removal - [place]    PARTIAL NEPHRECTOMY Left August 2014    ROBOTIC ASSISTED LAPAROSCOPIC NEPHRECTOMY PARTIAL Left 8/28/2014    Performed by Fabian Estevez MD at [facility] OR [address] [place] [address]     OBJECTIVE:     Current Medications:   Infusions:    Scheduled:    PRN:    Home Medications:  Prior to Admission medications    Medication Sig Start Date End Date Taking? Authorizing Provider   allopurinol (ZYLOPRIM) 100 MG tablet TAKE 2 TABLETS BY MOUTH DAILY 2/22/19   Diana Hemphill MD   apixaban (ELIQUIS) 5 mg Tab Take 1 tablet (5 mg total) by mouth 2 (two) times daily. 3/4/19   Uriel Tolentino MD   aspirin 81 MG Chew Take 1 tablet (81 mg total) by mouth once daily. 3/4/19 3/3/20  Uriel Tolentino MD   cyanocobalamin (VITAMIN B-12) 1000 MCG tablet Take 1 tablet (1,000 mcg total) by mouth once daily. 3/30/19   DEAN Vigil MD   fenofibrate 160 MG Tab Take 1 tablet (160 mg total) by mouth every evening. 10/29/18   Bacilio Larry MD   flecainide (TAMBOCOR) 150 MG Tab Take 1 tablet (150 mg total) by mouth 2 (two) times daily. 3/7/19 4/9/19  Uriel Tolentino MD   folic acid (FOLVITE) 1 MG tablet Take 1 tablet (1 mg total) by mouth once daily. 3/30/19 3/29/20  DEAN Vigil MD   metoprolol tartrate (LOPRESSOR) 25 MG tablet NOT CURRENTLY TAKING Take 0.5 tablets (12.5 mg total) by mouth 2 (two) times daily. 3/29/19 3/28/20  DEAN Vigil MD   mirtazapine (REMERON) 15 MG tablet Take 1 tablet (15 mg total) by mouth every evening. 4/9/19 4/8/20   "Bacilio Larry MD   multivitamin (THERAGRAN) tablet Take 1 tablet by mouth once daily. 3/30/19   W. Daljit Vigil MD   omega-3 acid ethyl esters (LOVAZA) 1 gram capsule Take 2 g by mouth 2 (two) times daily.    Historical Provider, MD   pantoprazole (PROTONIX) 40 MG tablet Take 1 tablet (40 mg total) by mouth once daily. 3/30/19 3/29/20  W. Daljit Vigil MD   ranitidine (ZANTAC) 150 MG capsule Take 150 mg by mouth 2 (two) times daily.    Historical Provider, MD   rosuvastatin (CRESTOR) 10 MG tablet Take 1 tablet (10 mg total) by mouth once daily. 3/4/19 3/3/20  Uriel Tolentino MD   thiamine 100 MG tablet Take 1 tablet (100 mg total) by mouth once daily. 3/30/19   W. Daljit Vigil MD     NOT CURRENTLY TAKING METOPROLOL    Vital Signs:  There were no vitals filed for this visit.      Mental Status Exam:  Appearance: unremarkable, age appropriate, casually dressed, good grooming  Level of Consciousness: alert  Behavior/Cooperation: friendly and cooperative  Psychomotor: unremarkable   Speech: normal tone, normal rate, normal pitch, normal volume, accented  Language: english, fluid  Orientation: grossly oriented  Attention Span/Concentration: intact  Memory: grossly intact  Mood: "good"  Affect: normal  Thought Process: linear, normal and logical  Associations: normal and logical  Thought Content: normal, no suicidality, no homicidality, delusions, or paranoia  Fund of Knowledge: Aware of current events   Abstraction: intact to conversation  Insight: fair  Judgment: fair    Labs/Imaging/Studies:   Recent Results (from the past 24 hour(s))   POCT BREATH ALCOHOL TEST    Collection Time: 04/30/19 11:25 AM   Result Value Ref Range    Breath Alcohol 0.000       ASSESSMENT     Donald Warren Abadie is a 63 y.o. male with a past psychiatric history of anxiety, [facility]. 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 " Patient and family describe worsening alcohol intake leading to significant social isolation. Patient with history legal complications (DWI) from alcohol use. Patient very recently admitted for an extensive ICU stay after accidentally overdosing on HTN medications while intoxicated from alcohol use. At presentation to the ER patient was noted to have multiple alcohol withdrawal symptoms as well reports from family consistent with Delirium Tremens. Patient is also high risk for mood disorder if he were to relapse, with multiple static factors (, older white male with medical comorbidities and hx substance abuse) that would contribute to risk of completed suicide.    IMPRESSION  - alcohol use disorder, severe  - anxiety, unspecified    PLAN     · continue patient on ABU protocol.  · Breathalyzer daily and urine toxicology at least three times per week.  · VS daily x 3 days.  · CBC and CMP unremarkable, LFTs WNL  · Patient counseled on abstinence from alcohol.    Medications: Continue current medications.  · Continue Trazodone 50 mg PO nightly for insomnia and anxiety. Patient reports good effect with no reported troublesome side effects.  · Discussed possibility of SSRI or other antidepressant for anxiety with patient at admit. He is open to the possibility but patient and MD agreed to revisit this option as he progresses in program - at admit had recent  increase in Remeron dosage (7.5 to 15 nightly) and patient feels he is sleeping well with anxiety lessened and restricted to night time only.  Revisited SSRI 4/26/19, patient feels anxiety well controlled and would like to defer any additional tx (to remeron). Pt changed to trazodone 4/30 2/2 increased appetite.  Patient amenable to continue trazodone  · Plan to trial naltrexone  · Medications for alcohol use disorder were discussed including acamprosate, naltrexone and disulfiram.     Status: Continue treatment on ABU

## 2019-05-02 ENCOUNTER — HOSPITAL ENCOUNTER (OUTPATIENT)
Dept: PSYCHIATRY | Facility: HOSPITAL | Age: 64
Discharge: HOME | End: 2019-05-02
Attending: PSYCHIATRY & NEUROLOGY
Payer: COMMERCIAL

## 2019-05-02 DIAGNOSIS — F10.20 ALCOHOL USE DISORDER, SEVERE, IN CONTROLLED ENVIRONMENT: ICD-10-CM

## 2019-05-02 DIAGNOSIS — F10.931 ALCOHOL WITHDRAWAL SYNDROME, WITH DELIRIUM: ICD-10-CM

## 2019-05-02 DIAGNOSIS — F10.20 ALCOHOL USE DISORDER, SEVERE, DEPENDENCE: Primary | ICD-10-CM

## 2019-05-02 LAB — BREATH ALCOHOL: 0

## 2019-05-02 PROCEDURE — 90853 GROUP PSYCHOTHERAPY: CPT | Performed by: SOCIAL WORKER

## 2019-05-02 PROCEDURE — 90853 GROUP PSYCHOTHERAPY: CPT | Mod: ,,, | Performed by: PSYCHOLOGIST

## 2019-05-02 PROCEDURE — 90853 GROUP PSYCHOTHERAPY: CPT

## 2019-05-02 PROCEDURE — 90853 PR GROUP PSYCHOTHERAPY: ICD-10-PCS | Mod: ,,, | Performed by: PSYCHOLOGIST

## 2019-05-02 NOTE — PROGRESS NOTES
Family Psychotherapy (MD)    4/23/2019    Site: [facility]    Length of service: 30    Therapeutic intervention: 90857-Family therapy with patient; needed because to coordinate care and for discharge planning    Persons present: patient and daughter     Interval history: Discussed patients progress in program and remaining limitations. Facilitated communication of reema concerns to patient. Brainstormed ways to preserve independence while providing additional support for sobriety, eg starting meetings [place].     Target symptoms: alcohol abuse     Patient's interpersonal/verbal exchanges: 55203-Family therapy with patient:  active listening    Progress toward goals: progressing well    Diagnosis: Alcohol Use Disorder severe, dependence    Plan: ABU    Return to clinic: as scheduled

## 2019-05-02 NOTE — PROGRESS NOTES
Group Psychotherapy (PhD/LCSW)    Site: [facility]    Clinical status of patient: Intensive Outpatient Program (IOP)    Date: 5/2/2019    Group Focus: Psychodynamic Group Psychotherapy    Length of service: 48783 - 45-50 minutes    Number of patients in attendance: 6    Referred by: [facility] Treatment Team    Target symptoms: Alcohol Abuse    Patient's response to treatment: Active Listening and Self-disclosure    Progress toward goals: Progressing adequately    Interval History: Discussed working on getting insurance to cover home repairs.    Diagnosis: alcohol use disorder, severe, dependence    Plan: Continue treatment on ABU

## 2019-05-02 NOTE — PROGRESS NOTES
Group Psychotherapy (PhD/LCSW)    Site: [facility]    Clinical status of patient: Intensive Outpatient Program (IOP)    Date: 5/2/2019    Group Focus: Steps to Recovery    Length of service: 86904 - 45-50 minutes    Number of patients in attendance: 6    Referred by: [facility] Behavior Unit Treatment Team    Target symptoms: Alcohol Abuse    Patient's response to treatment: Active Listening-; Self-disclosure    Progress toward goals: Progressing adequately    Interval History: Session focus was Positive Psychology:  Using gratitude to enhance motivation, awareness of values, and balance.  Each patient shared ways they are grateful and identified examples of strengths and other qualities.    Diagnosis: alcohol use disorder, severe, dependence,     Plan: Continue treatment on ABU

## 2019-05-02 NOTE — PLAN OF CARE
05/02/19 1400   Activity/Group Therapy Checklist   Group Relapse Prevention  (Step Work )   Attendance Attended   Follows Direction Followed directions   Group Interactions/Observations Interacted appropriately   Affect/Mood Range Normal range   Affect/Mood Display Appropriate   Goal Progression Progressing

## 2019-05-02 NOTE — PROGRESS NOTES
Group Psychotherapy (PhD/LCSW)    Site: [facility]    Clinical status of patient: Intensive Outpatient Program (IOP)    Date: 5/2/2019    Group Focus: DBT-Based Group Therapy    Length of service: 08512 - 45-50 minutes    Number of patients in attendance: 13    Referred by: Add[facility]ive Behavior Unit Treatment Team    Target symptoms: Alcohol Abuse    Patient's response to treatment: Active Listening-; Self-disclosure    Progress toward goals: Progressing adequately    Interval History: Session focus was Interpersonal Effectiveness:  Validation (Part 2).  Patients practiced validation of others through role-play and examples.    Diagnosis: alcohol use disorder, severe, dependence,     Plan: Continue treatment on ABU

## 2019-05-03 ENCOUNTER — HOSPITAL ENCOUNTER (OUTPATIENT)
Dept: PSYCHIATRY | Facility: HOSPITAL | Age: 64
Discharge: HOME | End: 2019-05-03
Attending: PSYCHIATRY & NEUROLOGY
Payer: COMMERCIAL

## 2019-05-03 VITALS — SYSTOLIC BLOOD PRESSURE: 99 MMHG | HEART RATE: 75 BPM | DIASTOLIC BLOOD PRESSURE: 56 MMHG | RESPIRATION RATE: 16 BRPM

## 2019-05-03 DIAGNOSIS — F10.20 ALCOHOL USE DISORDER, SEVERE, IN CONTROLLED ENVIRONMENT: ICD-10-CM

## 2019-05-03 DIAGNOSIS — F10.20 ALCOHOL USE DISORDER, SEVERE, DEPENDENCE: Primary | ICD-10-CM

## 2019-05-03 DIAGNOSIS — F10.931 ALCOHOL WITHDRAWAL SYNDROME, WITH DELIRIUM: ICD-10-CM

## 2019-05-03 LAB
AMPHET+METHAMPHET UR QL: NEGATIVE
BARBITURATES UR QL SCN>200 NG/ML: NEGATIVE
BENZODIAZ UR QL SCN>200 NG/ML: NEGATIVE
BZE UR QL SCN: NEGATIVE
CANNABINOIDS UR QL SCN: NEGATIVE
CREAT UR-MCNC: 52 MG/DL (ref 23–375)
ETHANOL UR-MCNC: <10 MG/DL
METHADONE UR QL SCN>300 NG/ML: NEGATIVE
OPIATES UR QL SCN: NEGATIVE
PCP UR QL SCN>25 NG/ML: NEGATIVE
TOXICOLOGY INFORMATION: NORMAL

## 2019-05-03 PROCEDURE — 99232 SBSQ HOSP IP/OBS MODERATE 35: CPT | Mod: ,,, | Performed by: PSYCHIATRY & NEUROLOGY

## 2019-05-03 PROCEDURE — 90853 PR GROUP PSYCHOTHERAPY: ICD-10-PCS | Mod: ,,, | Performed by: PSYCHOLOGIST

## 2019-05-03 PROCEDURE — 90853 GROUP PSYCHOTHERAPY: CPT

## 2019-05-03 PROCEDURE — 90853 GROUP PSYCHOTHERAPY: CPT | Performed by: SOCIAL WORKER

## 2019-05-03 PROCEDURE — 99232 PR SUBSEQUENT HOSPITAL CARE,LEVL II: ICD-10-PCS | Mod: ,,, | Performed by: PSYCHIATRY & NEUROLOGY

## 2019-05-03 PROCEDURE — 80307 DRUG TEST PRSMV CHEM ANLYZR: CPT

## 2019-05-03 PROCEDURE — 90853 GROUP PSYCHOTHERAPY: CPT | Mod: ,,, | Performed by: PSYCHOLOGIST

## 2019-05-03 NOTE — PROGRESS NOTES
"5/3/2019 8:43 AM  Donald Warren Abadie  1955  [id number]    [facility] Intensive Outpatient Program Progress Note    STATUS:  Intensive Outpatient Program (IOP)    SUBJECTIVE:   Chief Complaint: substance abuse    History of Present Illness:   Donald Warren Abadie is a 63 y.o. male with a past psychiatric history of anxiety, unspecified , who presented to [facility]BU IOP due to alcohol abuse.     Patient was recently seen by Psychiatry CL service after a recent hospital stay, per their note 3/27/19:  "As per the patient and the daughter, the patient has been drinking since he was 14 years old. Pt began drinking heavily after retiring five years ago and as per the daughter, it has worsened over the past year. During the past two months he has left the house only to get more alcohol and has not been participating in his normal activities. The patient says that he was drinking about 18 drinks per day. Attempted sobriety for the first time 8 weeks ago and experienced tremors and withdrawal symptoms (denies DTs) and resumed drinking. The patient states that he tried quitting alcohol 3.5 weeks ago because he was "feeling bad" and also accidentally overdosed on his BB and flecainide to feel better, which resulted in his [facility] [place]. He endorsed sx of EtOH withdrawal on initial presentation, including anxiety, diaphoresis, hallucinations, and hallucinations.      The daughter believes that he is drinking more because he has nothing to occupy since retiring. She also thinks that his anxiety and depression have gotten worse and he uses drinking as a coping mechanism. The patient has had anxiety all his life, but it has worsened over the past few years. Anxiety is worse at night and reports poor sleep as a result. His depression has also worsened. He has tried Ativan in the past but was taken off of it because, as daughter states, he was addicted to it. He has also tried Effexor, Lexapro and Cymbalta. The daughter " "states that he was never on them long enough for them to work. She saw a difference with Cymbalta but thinks he stopped taking it because of the side effects. "    =================================    - Patient confirmed psych hx in above report. Was admitted to the hospital with extensive ICU stay from 3/15/19-3/29/19, was admitted following an accidental overdose of HTN medications, per family alcohol involved with the incident and patient showed signs of alcohol withdrawal when admitted.     Pleasant, linear throughout interview. Says only was drinking beer, confirms 18 beers per dayno liquor or wine. Says once he had no work obligations he started drinking all day (vs drinks after work). Drank 12 oz bud lights. Last drink was 3/15/19 after which patient was admitted to hospital (see above). Cites "dying twice" in the ICU as a wake up call - "I don't want to die!". Confirmed withdrawal symptoms previously reported when he tried to quit - says the first time stopped 2 weeks cold turkey (longest period sobriety since 14 y/o), second time was one week also with withdrawal symptoms. Says both times eventually the withdrawal symptoms went away but took about a week. Denies any seizures at that time, denies hx seizures. Says has been drinking daily since 14 y/o but denies any issues with work - "I never missed a day of work". Says after intermediate is when he started to acknowledge possibility of a problem "you better slow down". Denies alcohol related social problems. Cites medical problems and ICU stay as main wake-up calls.     Reports ongoing anxiety worse at night, says often affects his sleep. Wide ranging anxiety, says he can wake from sleep anxious and be unable to return to sleep. Started Remeron since discharge with planned increase to 15 mg soon. Feels it is helping with sleep "for the most part" and thinks he has some daytime benefits as well regarding anxiety. Denies mood issues. Relates to intermediate, says " "when he was working he was "wore out" at home and didn't have trouble with generalized anxiety or insomnia. Denies any hx SI/HI. During aforementioned withdrawals denies any periods of disorientation or AVH, though collateral reports indicate likely hx DTs. Does acknowledge he does not remember much of ICU stay (chart review with possible AVH). Denies illicit drug use, denies hx addiction issues with illicits.    Denies recent mood issues. Reports sleep and anxiety has improved since starting Remeron. Otherwise pan negative on SIGECAPS since recent hospital discharge.     =====================    INTERVAL HX    4/15/19  Patient doing well today. Had a good weekend, worked on pool this weekend. Mood stable and good. Eating and sleeping well at home. Reports increased appetite with Remeron. Denies other side effects. Denies SI/HI/AVH. Afternoon AA meetings going well, patient attending daily. Feels he has been surprised he enjoys AA as much as he does. Good insight to benefits of daily attendance even after maintaining sobriety, discussed people at meetings that have been sober ten years. In process of finding a sponsor. Group here going well. Denies cravings. Says anxiety is improving "everything is getting better". Patient with no other questions for MD, and does not have any particular topics he would like to discuss when offered.     Toxicology Screen: Alcohol biomarkers 4/12 negative; Utox 4/12 negative; breath alcohol 4/12 negative   Medication Side Effects: None  Cravings: no  No other acute psychiatric issues reported at this time.    4/17/19  Patient doing well. Reports mood doing good and stable. Says son-in-law came in for family today, explained for privacy reasons son-in-law unable to attend group meetings with other patients, patient voiced understanding. Says main result of family day is patient would like to address his hurt knee sooner rather than later. Picked up rx, plans to trial naltrexone at home " this weekend in case he gets nauseous. AA meetings going well, group here going well. Denies SI/HI/AVH. Plans to address knee as soon as done with program. Denies cravings , does report eating more. Sleeping well at home. Reports night time anxiety continues to improve. Denies medication side effects outside of possible increased appetite. Patient with no other questions for MD, and does not have any particular topics he/she would like to discuss when offered.    Toxicology Screen: Alcohol biomarkers 4/12 negative, 4/15 in process; Utox 4/15 negative; breath alcohol 4/16 negative   Medication Side Effects: increased appetite, though likely also due to sobriety and decreased etoh calories  Cravings: no  No other acute psychiatric issues reported at this time.    4/22/19  Patient doing well today. Reports had a good weekend. Didn't trial naltrexone over the weekend, plans to soon but plans to start by taking a quarter to minimize side effects. Patient had crawfish boil over the weekend, was able to avoid drinking and denies cravings. Denies physical complaints outside of knee pain. Eating and sleeping well. Planning to move remeron dose up to avoid morning grogginess. Denies SI/HI/AVH. AA meetings going well, group here going well. Denies medication side effects otherwise. Patient with no other questions for MD, and does not have any particular topics he/she would like to discuss when offered.    Toxicology Screen: Alcohol biomarkers 4/15 negative; Utox 4/17 negative; breath alcohol 4/18 negative   Medication Side Effects: denied  Cravings: no  No other acute psychiatric issues reported at this time.    4/24/19  Patient doing well, eating well at home. Reports trouble sleeping last night. Says Remeron helps him sleep but feels he may need to take earlier in the evening to time effect, advised that is fine and he can time as he pleases in the evening. Pleasant discussion about jazz fest, knee/foot issues. Reports good  mood and night time anxiety not an issue or keeping him from sleeping, denies daytime anxiety. Denies SI/HI/AVH. Denies medication side effects. Endorses knee pain. AA going well, good insight. Group here going well. Denies cravings, patient says memories of ICU stay keep him from wanting. Patient didn't trail naltrexone over the weekend, will revisit on Friday for possible weekend trial.    Toxicology Screen: Alcohol biomarkers 4/15 negative. 4/22 pending; Utox 4/22 negative; breath alcohol 4/23 negative   Medication Side Effects: denied  Cravings: no  No other acute psychiatric issues reported at this time.    4/26/19  Patient doing well today. Calm, conversational, pleasant. Arrangements for knee surgery after this program ongoing. Sleep continues to improve. Remeron has been helping his sleep and he has been able to take the pill at a time better for him (earlier). Discussed SSRI, patient. Denies SI/HI/AVH. AA meetings going well, group here going well. Denies mood issues or major fluctuations. Denies medication side effects. Knee pain but no other physical complaints. Reports anxiety much improved since admit, denies any night time anxiety that affects sleep. Denies cravings. Patient with no other questions for MD, and does not have any particular topics he/she would like to discuss when offered.    Toxicology Screen: Alcohol biomarkers 4/22 negative; Utox 4/24 negative; breath alcohol 4/25 negative   Medication Side Effects: denied  Cravings: no  No other acute psychiatric issues reported at this time.    4/29/19  Patient doing well today. Calm, conversational, pleasant. Patient reports he has been eating more sweets, had BG at home in 400s. Says last was ~200 when measured, will measure at home. Hadn't checked in a couple weeks, has discussed this with his PCP (Dr. Larry) in past and has an appointment with him in the next two weeks. Denies any physical symptoms symptoms during this. Endorses good and stable  "mood. Denies SI/HI/AVH. Remeron continues to be helpful, taking it earlier to avoid daytime grogginess. Concern for appetite effect as above, will continue to monitor. Reports anxiety continues to be improved, with little during daytime or night (vs staying up with anxiety previously). No alcohol cravings, but reports cravings for sweets. AA meetings going well, group here going well.     Toxicology Screen: Alcohol biomarkers 4/22 negative 4/29 in process; Utox 4/26 negative 4/29 in process; breath alcohol 4/29 negative   Medication Side Effects: denied  Cravings: no  No other acute psychiatric issues reported at this time.    05/01/19  Medications adjusted by Dr. Elaine yesterday as pt had reported weight gain. Remeron was discontinued and pt was started on trazodone 50 mg for sleep qhs. Pt is calm, cooperative, pleasant and conversational. He states that he is "feeling good" this morning and reports his mood has been good. Pt discusses his blood sugars being high and how he needs to follow up with his PCP; also discussed how he was worried about the increased dose of remeron was impacting his appetite and causing his sugars to rise. He states that Dr. Elaine changed his medication to trazodone and feels that it is working well. He reports sleeping aell and denies any grogginess, and expresses that he. He states that his anxiety is doing well and has no complaints. Denies grogginess, feels that trazodone has been helpful.  Denies alcohol cravings, AA meetings going well, groups going well. Denies SI/HI.     Toxicology Screen: Alcohol biomarkers 4/22 negative 4/29 in process; Utox 4/26 negative 4/29 neg; breath alcohol 4/29 negative, utox 501 neg and breath alcohol neg   Medication Side Effects: denied  Cravings: no  No other acute psychiatric issues reported at this time     05/03/2019  Pt reports that he has been doing well, an describes his mood as "good". Blood sugars still high but coming down. Discusses " "difficulty fixing house. Sleeping well, states he has been sleeping just as good as the remeron. Appetite is "good" .Pt states that his anxiety is "almost gone". Attending AA meetings and says it's going well, says groups here are going well. Denies cravings and states that whenever he sees a beer he thinks about being in the ICU and has no desire to be back in this position. Discusses weekend plans of working on his house. Denies SI/HI    Toxicology Screen: Alcohol biomarkers 4/22 negative 4/29 in process; Utox 4/26 negative 4/29 neg; breath alcohol 4/29 negative   Medication Side Effects: denied  Cravings: denied  No other acute psychiatric issues reported at this time     Allergies:  No known drug allergies  Medical/Surgical History:  Past Medical History:   Diagnosis Date    A-fib     Alcohol abuse     Arthritis     Chronic gout     Diabetes mellitus     DM (diabetes mellitus) 11/16/2016    Fatty liver     Hyperlipidemia     Renal cell cancer 2014     Past Surgical History:   Procedure Laterality Date    ABSCESS DRAINAGE      perirectal    COLONOSCOPY      FISTULOTOMY N/A 5/19/2015    Performed by Shane [facility] MD at [facility] OR [address] [place] [address]    HAND SURGERY Left     HEMORRHOIDECTOMY N/A 5/19/2015    Performed by Shane Hughes MD at [facility] OR [address] [place] [address]    KIDNEY SURGERY      partial left kidney removal - [place]    PARTIAL NEPHRECTOMY Left August 2014    ROBOTIC ASSISTED LAPAROSCOPIC NEPHRECTOMY PARTIAL Left 8/28/2014    Performed by Fabian Estevez MD at [facility] OR [address] [place] [address]     OBJECTIVE:     Current Medications:   Infusions:    Scheduled:    PRN:    Home Medications:  Prior to Admission medications    Medication Sig Start Date End Date Taking? Authorizing Provider   allopurinol (ZYLOPRIM) 100 MG tablet TAKE 2 TABLETS BY MOUTH DAILY 2/22/19   Diana Hemphill MD   apixaban (ELIQUIS) 5 mg Tab Take 1 tablet (5 mg total) by mouth 2 (two) times daily. 3/4/19   Uriel Tolentino MD   aspirin 81 MG " Chew Take 1 tablet (81 mg total) by mouth once daily. 3/4/19 3/3/20  Uriel Tolentino MD   cyanocobalamin (VITAMIN B-12) 1000 MCG tablet Take 1 tablet (1,000 mcg total) by mouth once daily. 3/30/19   DEAN Vigil MD   fenofibrate 160 MG Tab Take 1 tablet (160 mg total) by mouth every evening. 10/29/18   Bacilio Larry MD   flecainide (TAMBOCOR) 150 MG Tab Take 1 tablet (150 mg total) by mouth 2 (two) times daily. 3/7/19 4/9/19  Uriel Tolentino MD   folic acid (FOLVITE) 1 MG tablet Take 1 tablet (1 mg total) by mouth once daily. 3/30/19 3/29/20  DEAN Vigil MD   metoprolol tartrate (LOPRESSOR) 25 MG tablet NOT CURRENTLY TAKING Take 0.5 tablets (12.5 mg total) by mouth 2 (two) times daily. 3/29/19 3/28/20  DEAN Vigil MD   mirtazapine (REMERON) 15 MG tablet Take 1 tablet (15 mg total) by mouth every evening. 4/9/19 4/8/20  Bacilio Larry MD   multivitamin (THERAGRAN) tablet Take 1 tablet by mouth once daily. 3/30/19   DEAN Vigil MD   omega-3 acid ethyl esters (LOVAZA) 1 gram capsule Take 2 g by mouth 2 (two) times daily.    Historical Provider, MD   pantoprazole (PROTONIX) 40 MG tablet Take 1 tablet (40 mg total) by mouth once daily. 3/30/19 3/29/20  DEAN Vigil MD   ranitidine (ZANTAC) 150 MG capsule Take 150 mg by mouth 2 (two) times daily.    Historical Provider, MD   rosuvastatin (CRESTOR) 10 MG tablet Take 1 tablet (10 mg total) by mouth once daily. 3/4/19 3/3/20  Uriel Tolentino MD   thiamine 100 MG tablet Take 1 tablet (100 mg total) by mouth once daily. 3/30/19   WJf Vgiil MD     NOT CURRENTLY TAKING METOPROLOL    Vital Signs:  There were no vitals filed for this visit.      Mental Status Exam:  Appearance: unremarkable, age appropriate, casually dressed, good grooming  Level of Consciousness: alert  Behavior/Cooperation: friendly and cooperative  Psychomotor: unremarkable   Speech: normal tone, normal rate, normal pitch, normal volume, accented  Language: english,  "fluid  Orientation: grossly oriented  Attention Span/Concentration: intact  Memory: grossly intact  Mood: "good"  Affect: normal  Thought Process: linear, normal and logical  Associations: normal and logical  Thought Content: normal, no suicidality, no homicidality, delusions, or paranoia volunteered. Denies SI/HI when asked directly  Fund of Knowledge: Aware of current events   Abstraction: intact to conversation  Insight: fair  Judgment: fair    Labs/Imaging/Studies:   Recent Results (from the past 24 hour(s))   POCT BREATH ALCOHOL TEST    Collection Time: 05/02/19 10:53 AM   Result Value Ref Range    Breath Alcohol 0.000       ASSESSMENT     Donald Warren Abadie is a 63 y.o. male with a past psychiatric history of anxiety, who presented to ABU IOP due to alcoho abuse. 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 Patient and family describe worsening alcohol intake leading to significant social isolation. Patient with history legal complications (DWI) from alcohol use. Patient very recently admitted for an extensive ICU stay after accidentally overdosing on HTN medications while intoxicated from alcohol use. At presentation to the ER patient was noted to have multiple alcohol withdrawal symptoms as well reports from family consistent with Delirium Tremens. Patient is also high risk for mood disorder if he were to relapse, with multiple static factors (, older white male with medical comorbidities and hx substance abuse) that would contribute to risk of completed suicide; pt is appropriate for continued care in IOP    IMPRESSION  - alcohol use disorder, severe  - anxiety, unspecified    PLAN     · continue patient on ABU protocol.  · Breathalyzer daily and urine toxicology at least three times per week.  · VS daily x 3 days.  · CBC and CMP unremarkable, LFTs WNL  · Patient counseled on abstinence from alcohol.    Medications: Continue " current medications.  · Continue Trazodone 50 mg PO nightly for insomnia and anxiety. Patient reports good effect with no reported  side effects.  · Discussed possibility of SSRI or other antidepressant for anxiety with patient at admit. He is open to the possibility but patient and MD agreed to revisit this option as he progresses in program - at admit had recent  increase in Remeron dosage (7.5 to 15 nightly) and patient feels he is sleeping well with anxiety lessened and restricted to night time only.  Revisited SSRI 4/26/19, patient but felt well controlled. Pt changed to trazodone 4/30 2/2 increased appetite.  Patient amenable to continue trazodone and denies any ongoing anxiety  · Continue naltrexone 50 mg (Rx'd 4/16)  · Medications for alcohol use disorder were discussed including acamprosate, naltrexone and disulfiram.     Status: Continue treatment on ABU    Taylor Tom MD

## 2019-05-03 NOTE — PROGRESS NOTES
Group Psychotherapy (PhD/LCSW)    Site: [facility]    Clinical status of patient: Intensive Outpatient Program (IOP)    Date: 5/3/2019    Group Focus: Stress Management    Length of service: 97085 - 45-50 minutes    Number of patients in attendance: 9    Referred by: Addictive Behavior Unit Treatment Team    Target symptoms: Alcohol Abuse    Patient's response to treatment: Active Listening    Progress toward goals: Progressing adequately    Interval History: Group learned mindfulness techniques (progressive muscle relaxation) to improve present-moment awareness, impulsive behavior tendencies, and tolerance of various emotional states.    Diagnosis: Alcohol Use Disorder    Plan: Continue treatment on ABU

## 2019-05-03 NOTE — PLAN OF CARE
05/03/19 1300   Activity/Group Therapy Checklist   Group Relapse Prevention  (Step Work )   Attendance Attended   Follows Direction Followed directions   Group Interactions/Observations Interacted appropriately   Affect/Mood Range Normal range   Affect/Mood Display Appropriate   Goal Progression Progressing

## 2019-05-04 NOTE — PROGRESS NOTES
Group Psychotherapy (PhD/LCSW)    Site: [facility]    Clinical status of patient: Intensive Outpatient Program (IOP)    Date: 5/3/2019    Group Focus: Psychodynamic Group Psychotherapy    Length of service: 88041 - 45-50 minutes    Number of patients in attendance: 6    Referred by: Addictive Behavior Unit Treatment Team    Target symptoms: Alcohol Abuse    Patient's response to treatment: Active Listening and Self-disclosurePt     Progress toward goals: Progressing adequately    Interval History: Discussed how far he has come since he started recovery. Noted that he feels ready to start going to an AA meeting in his neighborhood rather than driving to the [place] to attend one that his son-in-law's father sponsors.     Diagnosis: alcohol use disorder, severe, dependence    Plan: Continue treatment on ABU

## 2019-05-05 NOTE — PROGRESS NOTES
Group Psychotherapy (MD)     Site: [facility]     Clinical status of patient: Intensive Outpatient Program (IOP)     Date: 4/17/19     Group Focus: Medical and Neuropsychiatric Bases of Psychiatric Disease and Addiction: Alcohol use     Length of service: 21750 - 45-50 minutes     Number of patients in attendance: 8     Referred by: Addictive Behavioral Unit Treatment Team     Target symptoms: Substance use, risk for substance use, self medicating behaviors, anxiety.      Patient's response to treatment: Active Listening       Progress toward goals: progressing adequately     Interval hx: Provided education about alcohol including effects on physical and medical health, explained neurobiology of alcohol effects and addictive circuits in the brain. Provided overview of concept of self medication and contrasted it with self care. Discussed role of anxiety in alcohol use disorder and contribution of alcohol use to anxiety and depression. had discussion of possible negative effects of alcohol use on various behaviors and symptoms.     Diagnosis: Alcohol Use disorder     Plan: Continue treatment on ABU

## 2019-05-05 NOTE — PROGRESS NOTES
Group Psychotherapy (MD)     Site: [facility]     Clinical status of patient: Intensive Outpatient Program (IOP)     Date: 4/24/19     Group Focus: Medical and Neuropsychiatric Bases of Psychiatric Disease and Addiction: depression     Length of service: 18500 - 45-50 minutes     Number of patients in attendance: 8     Referred by: Addictive Behavioral Unit Treatment Team     Target symptoms: Depression     Patient's response to treatment: Active Listening       Progress toward goals: progressing adequately    Interval history: Provided psychoeducation about the medical model of depression, including basic description of neuroanatomy  of depression. Discussed common symptoms of depression, encouraging patients to develop list of their own particular depressive symptoms, in order for them to recognize when depressed. Brainstormed precipitating or exacerbating factors in depression, in order to help patients develop their own behavioral treatment. Encouraged them to see these factors as opportunities to improve their own mood by either avoiding or coming up with solutions to these factors. Continued to discuss concepts of self medication versus self care, discussing how long-termsolutions that strengthen cognitive and executive function are preferable to short term fixes. Instructed in principles of Behavioral Activation therapy, including listing activities for mastery and pleasure.         Diagnosis: Alcohol Use Disorder     Plan: Continue treatment on ABU

## 2019-05-05 NOTE — PROGRESS NOTES
"Group Psychotherapy (MD)     Site: [facility]     Clinical status of patient: Intensive Outpatient Program (IOP)     Date: 5/1/19     Group Focus: Medical and Neuropsychiatric Bases of Psychiatric Disease and Addiction: anxiety     Length of service: 79957 - 45-50 minutes     Number of patients in attendance: 10     Referred by: Addictive Behavioral Unit Treatment Team     Target symptoms: Anxiety     Patient's response to treatment: Active Listening       Interval history: Discussed psychiatric symptoms of anxiety and neurochemical basis of anxiety, relating it to addiction and other psychiatric disorders. Provided rationale for use of various anxiety treatments, introduced concept of avoidance and discussed ways to gradually expose self to sources of anxiety. Explored responses to anxiety and helped patient separte those responses into either 'self care" or "self medication".      Progress towards goals: progressing adequately       Diagnosis: Alcohol Use Disorder     Plan: Continue treatment on ABU             "

## 2019-05-06 ENCOUNTER — HOSPITAL ENCOUNTER (OUTPATIENT)
Dept: PSYCHIATRY | Facility: HOSPITAL | Age: 64
Discharge: HOME | End: 2019-05-06
Attending: PSYCHIATRY & NEUROLOGY
Payer: COMMERCIAL

## 2019-05-06 VITALS — SYSTOLIC BLOOD PRESSURE: 95 MMHG | HEART RATE: 64 BPM | RESPIRATION RATE: 16 BRPM | DIASTOLIC BLOOD PRESSURE: 54 MMHG

## 2019-05-06 DIAGNOSIS — F10.20 ALCOHOL USE DISORDER, SEVERE, DEPENDENCE: Primary | ICD-10-CM

## 2019-05-06 DIAGNOSIS — F10.931 ALCOHOL WITHDRAWAL SYNDROME, WITH DELIRIUM: ICD-10-CM

## 2019-05-06 DIAGNOSIS — F10.20 ALCOHOL USE DISORDER, SEVERE, IN CONTROLLED ENVIRONMENT: ICD-10-CM

## 2019-05-06 LAB
AMPHET+METHAMPHET UR QL: NEGATIVE
BARBITURATES UR QL SCN>200 NG/ML: NEGATIVE
BENZODIAZ UR QL SCN>200 NG/ML: NEGATIVE
BREATH ALCOHOL: 0
BZE UR QL SCN: NEGATIVE
CANNABINOIDS UR QL SCN: NEGATIVE
CREAT UR-MCNC: 34 MG/DL (ref 23–375)
ETHANOL UR-MCNC: <10 MG/DL
ETHYL GLUCURONIDE: NORMAL
METHADONE UR QL SCN>300 NG/ML: NEGATIVE
OPIATES UR QL SCN: NEGATIVE
PCP UR QL SCN>25 NG/ML: NEGATIVE
TOXICOLOGY INFORMATION: NORMAL

## 2019-05-06 PROCEDURE — 90853 PR GROUP PSYCHOTHERAPY: ICD-10-PCS | Mod: 59,,, | Performed by: PSYCHOLOGIST

## 2019-05-06 PROCEDURE — 90853 GROUP PSYCHOTHERAPY: CPT

## 2019-05-06 PROCEDURE — 90853 GROUP PSYCHOTHERAPY: CPT | Mod: 59,,, | Performed by: PSYCHOLOGIST

## 2019-05-06 PROCEDURE — 99232 PR SUBSEQUENT HOSPITAL CARE,LEVL II: ICD-10-PCS | Mod: ,,, | Performed by: PSYCHIATRY & NEUROLOGY

## 2019-05-06 PROCEDURE — 80307 DRUG TEST PRSMV CHEM ANLYZR: CPT

## 2019-05-06 PROCEDURE — 90853 GROUP PSYCHOTHERAPY: CPT | Performed by: SOCIAL WORKER

## 2019-05-06 PROCEDURE — 99232 SBSQ HOSP IP/OBS MODERATE 35: CPT | Mod: ,,, | Performed by: PSYCHIATRY & NEUROLOGY

## 2019-05-06 NOTE — PLAN OF CARE
05/06/19 1000   Activity/Group Therapy Checklist   Group Educational  (grief/loss )   Attendance Attended   Follows Direction Followed directions   Group Interactions/Observations Interacted appropriately;Tearful   Affect/Mood Range Normal range   Affect/Mood Display Appropriate   Goal Progression Progressing

## 2019-05-06 NOTE — PROGRESS NOTES
"5/6/2019 8:43 AM  Donald Warren Abadie  1955  [id number]    Addictive Behavior Unit Intensive Outpatient Program Progress Note    STATUS:  Intensive Outpatient Program (IOP)    SUBJECTIVE:   Chief Complaint: substance abuse    History of Present Illness:   Donald Warren Abadie is a 63 y.o. male with a past psychiatric history of anxiety, unspecified , who presented to ABU IOP due to alcohol abuse.     Patient was recently seen by Psychiatry CL service after a recent hospital stay, per their note 3/27/19:  "As per the patient and the daughter, the patient has been drinking since he was 14 years old. Pt began drinking heavily after retiring five years ago and as per the daughter, it has worsened over the past year. During the past two months he has left the house only to get more alcohol and has not been participating in his normal activities. The patient says that he was drinking about 18 drinks per day. Attempted sobriety for the first time 8 weeks ago and experienced tremors and withdrawal symptoms (denies DTs) and resumed drinking. The patient states that he tried quitting alcohol 3.5 weeks ago because he was "feeling bad" and also accidentally overdosed on his BB and flecainide to feel better, which resulted in his [facility] admission. He endorsed sx of EtOH withdrawal on initial presentation, including anxiety, diaphoresis, hallucinations, and hallucinations.      The daughter believes that he is drinking more because he has nothing to occupy since retiring. She also thinks that his anxiety and depression have gotten worse and he uses drinking as a coping mechanism. The patient has had anxiety all his life, but it has worsened over the past few years. Anxiety is worse at night and reports poor sleep as a result. His depression has also worsened. He has tried Ativan in the past but was taken off of it because, as daughter states, he was addicted to it. He has also tried Effexor, Lexapro and Cymbalta. The daughter " "states that he was never on them long enough for them to work. She saw a difference with Cymbalta but thinks he stopped taking it because of the side effects. "    =================================    - Patient confirmed psych hx in above report. Was admitted to the hospital with extensive ICU stay from 3/15/19-3/29/19, was admitted following an accidental overdose of HTN medications, per family alcohol involved with the incident and patient showed signs of alcohol withdrawal when admitted.     Pleasant, linear throughout interview. Says only was drinking beer, confirms 18 beers per dayno liquor or wine. Says once he had no work obligations he started drinking all day (vs drinks after work). Drank 12 oz bud lights. Last drink was 3/15/19 after which patient was admitted to hospital (see above). Cites "dying twice" in the ICU as a wake up call - "I don't want to die!". Confirmed withdrawal symptoms previously reported when he tried to quit - says the first time stopped 2 weeks cold turkey (longest period sobriety since 14 y/o), second time was one week also with withdrawal symptoms. Says both times eventually the withdrawal symptoms went away but took about a week. Denies any seizures at that time, denies hx seizures. Says has been drinking daily since 14 y/o but denies any issues with work - "I never missed a day of work". Says after custodial is when he started to acknowledge possibility of a problem "you better slow down". Denies alcohol related social problems. Cites medical problems and ICU stay as main wake-up calls.     Reports ongoing anxiety worse at night, says often affects his sleep. Wide ranging anxiety, says he can wake from sleep anxious and be unable to return to sleep. Started Remeron since discharge with planned increase to 15 mg soon. Feels it is helping with sleep "for the most part" and thinks he has some daytime benefits as well regarding anxiety. Denies mood issues. Relates to custodial, says " "when he was working he was "wore out" at home and didn't have trouble with generalized anxiety or insomnia. Denies any hx SI/HI. During aforementioned withdrawals denies any periods of disorientation or AVH, though collateral reports indicate likely hx DTs. Does acknowledge he does not remember much of ICU stay (chart review with possible AVH). Denies illicit drug use, denies hx addiction issues with illicits.    Denies recent mood issues. Reports sleep and anxiety has improved since starting Remeron. Otherwise pan negative on SIGECAPS since recent hospital discharge.     =====================    INTERVAL HX    4/15/19  Patient doing well today. Had a good weekend, worked on pool this weekend. Mood stable and good. Eating and sleeping well at home. Reports increased appetite with Remeron. Denies other side effects. Denies SI/HI/AVH. Afternoon AA meetings going well, patient attending daily. Feels he has been surprised he enjoys AA as much as he does. Good insight to benefits of daily attendance even after maintaining sobriety, discussed people at meetings that have been sober ten years. In process of finding a sponsor. Group here going well. Denies cravings. Says anxiety is improving "everything is getting better". Patient with no other questions for MD, and does not have any particular topics he would like to discuss when offered.     Toxicology Screen: Alcohol biomarkers 4/12 negative; Utox 4/12 negative; breath alcohol 4/12 negative   Medication Side Effects: None  Cravings: no  No other acute psychiatric issues reported at this time.    4/17/19  Patient doing well. Reports mood doing good and stable. Says son-in-law came in for family today, explained for privacy reasons son-in-law unable to attend group meetings with other patients, patient voiced understanding. Says main result of family day is patient would like to address his hurt knee sooner rather than later. Picked up rx, plans to trial naltrexone at home " this weekend in case he gets nauseous. AA meetings going well, group here going well. Denies SI/HI/AVH. Plans to address knee as soon as done with program. Denies cravings , does report eating more. Sleeping well at home. Reports night time anxiety continues to improve. Denies medication side effects outside of possible increased appetite. Patient with no other questions for MD, and does not have any particular topics he/she would like to discuss when offered.    Toxicology Screen: Alcohol biomarkers 4/12 negative, 4/15 in process; Utox 4/15 negative; breath alcohol 4/16 negative   Medication Side Effects: increased appetite, though likely also due to sobriety and decreased etoh calories  Cravings: no  No other acute psychiatric issues reported at this time.    4/22/19  Patient doing well today. Reports had a good weekend. Didn't trial naltrexone over the weekend, plans to soon but plans to start by taking a quarter to minimize side effects. Patient had crawfish boil over the weekend, was able to avoid drinking and denies cravings. Denies physical complaints outside of knee pain. Eating and sleeping well. Planning to move remeron dose up to avoid morning grogginess. Denies SI/HI/AVH. AA meetings going well, group here going well. Denies medication side effects otherwise. Patient with no other questions for MD, and does not have any particular topics he/she would like to discuss when offered.    Toxicology Screen: Alcohol biomarkers 4/15 negative; Utox 4/17 negative; breath alcohol 4/18 negative   Medication Side Effects: denied  Cravings: no  No other acute psychiatric issues reported at this time.    4/24/19  Patient doing well, eating well at home. Reports trouble sleeping last night. Says Remeron helps him sleep but feels he may need to take earlier in the evening to time effect, advised that is fine and he can time as he pleases in the evening. Pleasant discussion about jazz fest, knee/foot issues. Reports good  mood and night time anxiety not an issue or keeping him from sleeping, denies daytime anxiety. Denies SI/HI/AVH. Denies medication side effects. Endorses knee pain. AA going well, good insight. Group here going well. Denies cravings, patient says memories of ICU stay keep him from wanting. Patient didn't trail naltrexone over the weekend, will revisit on Friday for possible weekend trial.    Toxicology Screen: Alcohol biomarkers 4/15 negative. 4/22 pending; Utox 4/22 negative; breath alcohol 4/23 negative   Medication Side Effects: denied  Cravings: no  No other acute psychiatric issues reported at this time.    4/26/19  Patient doing well today. Calm, conversational, pleasant. Arrangements for knee surgery after this program ongoing. Sleep continues to improve. Remeron has been helping his sleep and he has been able to take the pill at a time better for him (earlier). Discussed SSRI, patient. Denies SI/HI/AVH. AA meetings going well, group here going well. Denies mood issues or major fluctuations. Denies medication side effects. Knee pain but no other physical complaints. Reports anxiety much improved since admit, denies any night time anxiety that affects sleep. Denies cravings. Patient with no other questions for MD, and does not have any particular topics he/she would like to discuss when offered.    Toxicology Screen: Alcohol biomarkers 4/22 negative; Utox 4/24 negative; breath alcohol 4/25 negative   Medication Side Effects: denied  Cravings: no  No other acute psychiatric issues reported at this time.    4/29/19  Patient doing well today. Calm, conversational, pleasant. Patient reports he has been eating more sweets, had BG at home in 400s. Says last was ~200 when measured, will measure at home. Hadn't checked in a couple weeks, has discussed this with his PCP (Dr. Larry) in past and has an appointment with him in the next two weeks. Denies any physical symptoms symptoms during this. Endorses good and stable  "mood. Denies SI/HI/AVH. Remeron continues to be helpful, taking it earlier to avoid daytime grogginess. Concern for appetite effect as above, will continue to monitor. Reports anxiety continues to be improved, with little during daytime or night (vs staying up with anxiety previously). No alcohol cravings, but reports cravings for sweets. AA meetings going well, group here going well.     Toxicology Screen: Alcohol biomarkers 4/22 negative 4/29 in process; Utox 4/26 negative 4/29 in process; breath alcohol 4/29 negative   Medication Side Effects: denied  Cravings: no  No other acute psychiatric issues reported at this time.    05/01/19  Medications adjusted by Dr. Elaine yesterday as pt had reported weight gain. Remeron was discontinued and pt was started on trazodone 50 mg for sleep qhs. Pt is calm, cooperative, pleasant and conversational. He states that he is "feeling good" this morning and reports his mood has been good. Pt discusses his blood sugars being high and how he needs to follow up with his PCP; also discussed how he was worried about the increased dose of remeron was impacting his appetite and causing his sugars to rise. He states that Dr. Elaine changed his medication to trazodone and feels that it is working well. He reports sleeping aell and denies any grogginess, and expresses that he. He states that his anxiety is doing well and has no complaints. Denies grogginess, feels that trazodone has been helpful.  Denies alcohol cravings, AA meetings going well, groups going well. Denies SI/HI.     Toxicology Screen: Alcohol biomarkers 4/22 negative 4/29 in process; Utox 4/26 negative 4/29 neg; breath alcohol 4/29 negative, utox 501 neg and breath alcohol neg   Medication Side Effects: denied  Cravings: no  No other acute psychiatric issues reported at this time     05/03/2017  Pt reports that he has been doing well, an describes his mood as "good". Blood sugars still high but coming down. Discusses " "difficulty fixing house. Sleeping well, states he has been sleeping just as good as the remeron. Appetite is "good" .Pt states that his anxiety is "almost gone". Attending AA meetings and says it's going well, says groups here are going well. Denies cravings and states that whenever he sees a beer he thinks about being in the ICU and has no desire to be back in this position. Discusses weekend plans of working on his house. Denies SI/HI    Toxicology Screen: Alcohol biomarkers 4/22 negative 4/29 in process; Utox 4/26 negative 4/29 neg; breath alcohol 4/29 negative   Medication Side Effects: denied  Cravings: denied  No other acute psychiatric issues reported at this time     5/06/2017  Pt reports doing well, and states his mood is "good". Discussed his weekend, spent time with his family and doing home repairs. Denies alcohol cravings, denies relapses. Attending AA meetings over the weekend which have been going well. Groups in the ABU going well without issues. . Discussed decreasing blood sugars which pt is happy about and he is looking forward to telling his PCP about his progress at his appointment Friday.Patient education provided-discussed how addiction is a chronic disease. States the trazodone continues to work well for sleep and denies SE. Denies SI/HI/anxiety.     Cravings   Relapses  Going to AA meetings, going well    Toxicology Screen: Alcohol biomarkers 4/22 negative 4/29 in process; breath alcohol negative and UDS negative   Medication Side Effects: denied  Cravings: denied  No other acute psychiatric issues reported at this time     Allergies:  No known drug allergies  Medical/Surgical History:  Past Medical History:   Diagnosis Date    A-fib     Alcohol abuse     Arthritis     Chronic gout     Diabetes mellitus     DM (diabetes mellitus) 11/16/2016    Fatty liver     Hyperlipidemia     Renal cell cancer 2014     Past Surgical History:   Procedure Laterality Date    ABSCESS DRAINAGE      " perirectal    COLONOSCOPY      FISTULOTOMY N/A 5/19/2015    [facility] at [facility]    HAND SURGERY Left     HEMORRHOIDECTOMY N/A 5/19/2015    [facility]    KIDNEY SURGERY      partial left kidney removal - [place]    PARTIAL NEPHRECTOMY Left August 2014    ROBOTIC ASSISTED LAPAROSCOPIC NEPHRECTOMY PARTIAL Left 8/28/2014    Performed by Fabian Estevez MD at [facility] OR [address] [place] [address]     OBJECTIVE:     Current Medications:   Infusions:    Scheduled:    PRN:    Home Medications:  Prior to Admission medications    Medication Sig Start Date End Date Taking? Authorizing Provider   allopurinol (ZYLOPRIM) 100 MG tablet TAKE 2 TABLETS BY MOUTH DAILY 2/22/19   Diana Hemphill MD   apixaban (ELIQUIS) 5 mg Tab Take 1 tablet (5 mg total) by mouth 2 (two) times daily. 3/4/19   Uriel Tolentino MD   aspirin 81 MG Chew Take 1 tablet (81 mg total) by mouth once daily. 3/4/19 3/3/20  Uriel Tolentino MD   cyanocobalamin (VITAMIN B-12) 1000 MCG tablet Take 1 tablet (1,000 mcg total) by mouth once daily. 3/30/19   DEAN Vigil MD   fenofibrate 160 MG Tab Take 1 tablet (160 mg total) by mouth every evening. 10/29/18   Bacilio Larry MD   flecainide (TAMBOCOR) 150 MG Tab Take 1 tablet (150 mg total) by mouth 2 (two) times daily. 3/7/19 4/9/19  Uriel Tolentino MD   folic acid (FOLVITE) 1 MG tablet Take 1 tablet (1 mg total) by mouth once daily. 3/30/19 3/29/20  DEAN Vigil MD   metoprolol tartrate (LOPRESSOR) 25 MG tablet NOT CURRENTLY TAKING Take 0.5 tablets (12.5 mg total) by mouth 2 (two) times daily. 3/29/19 3/28/20  DEAN Vigil MD   mirtazapine (REMERON) 15 MG tablet Take 1 tablet (15 mg total) by mouth every evening. 4/9/19 4/8/20  Bacilio Larry MD   multivitamin (THERAGRAN) tablet Take 1 tablet by mouth once daily. 3/30/19   DEAN Vigil MD   omega-3 acid ethyl esters (LOVAZA) 1 gram capsule Take 2 g by mouth 2 (two) times  "daily.    Historical Provider, MD   pantoprazole (PROTONIX) 40 MG tablet Take 1 tablet (40 mg total) by mouth once daily. 3/30/19 3/29/20  DEAN Vigil MD   ranitidine (ZANTAC) 150 MG capsule Take 150 mg by mouth 2 (two) times daily.    Historical Provider, MD   rosuvastatin (CRESTOR) 10 MG tablet Take 1 tablet (10 mg total) by mouth once daily. 3/4/19 3/3/20  Uriel Tolentino MD   thiamine 100 MG tablet Take 1 tablet (100 mg total) by mouth once daily. 3/30/19   DEAN Vigil MD     NOT CURRENTLY TAKING METOPROLOL    Vital Signs:  There were no vitals filed for this visit.      Mental Status Exam:  Appearance: unremarkable, age appropriate, casually dressed, good grooming  Level of Consciousness: alert  Behavior/Cooperation: friendly and cooperative  Psychomotor: unremarkable   Speech: normal tone, normal rate, normal pitch, normal volume, accented  Language: english, fluid  Orientation: grossly oriented  Attention Span/Concentration: intact  Memory: grossly intact  Mood: "good"  Affect: normal  Thought Process: linear, normal and logical  Associations: normal and logical  Thought Content: normal, no suicidality, no homicidality, delusions, or paranoia volunteered. Denies SI/HI when asked directly  Fund of Knowledge: Aware of current events   Abstraction: intact to conversation  Insight: fair, improving  Judgment: fair    Labs/Imaging/Studies:   No results found for this or any previous visit (from the past 24 hour(s)).   ASSESSMENT     Donald Warren Abadie is a 63 y.o. male with a past psychiatric history of anxiety, who presented to [facility] due to alcoho abuse. 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 Patient and family describe worsening alcohol intake leading to significant social isolation. Patient with history legal complications (DWI) from alcohol use. Patient very recently admitted for an extensive ICU stay after " accidentally overdosing on HTN medications while intoxicated from alcohol use. At presentation to the ER patient was noted to have multiple alcohol withdrawal symptoms as well reports from family consistent with Delirium Tremens. Patient is also high risk for mood disorder if he were to relapse, with multiple static factors (, older white male with medical comorbidities and hx substance abuse) that would contribute to risk of completed suicide; pt is appropriate for continued care in IOP    IMPRESSION  - alcohol use disorder, severe  - anxiety, unspecified    PLAN     · continue patient on ABU protocol.  · Breathalyzer daily and urine toxicology at least three times per week.  · VS daily x 3 days.  · CBC and CMP unremarkable, LFTs WNL  · Patient counseled on abstinence from alcohol.    Medications: Continue current medications.  · Continue Trazodone 50 mg PO nightly for insomnia and anxiety. Patient reports good effect with no reported  side effects.  · Discussed possibility of SSRI or other antidepressant for anxiety with patient at admit. He is open to the possibility but patient and MD agreed to revisit this option as he progresses in program - at admit had recent  increase in Remeron dosage (7.5 to 15 nightly) and patient feels he is sleeping well with anxiety lessened and restricted to night time only.  Revisited SSRI 4/26/19, patient but felt well controlled. Pt changed to trazodone 4/30 2/2 increased appetite.  Patient amenable to continue trazodone and denies any ongoing anxiety  · Continue naltrexone 50 mg (Rx'd 4/16)  · Medications for alcohol use disorder were discussed including acamprosate, naltrexone and disulfiram.     Status: Continue treatment on ABU    Taylor Tom MD   [employer] Psychiatry PGY2

## 2019-05-06 NOTE — PROGRESS NOTES
Group Psychotherapy (PhD/LCSW)    Site: [facility]    Clinical status of patient: Intensive Outpatient Program (IOP)    Date: 5/6/2019    Group Focus: Communication Skills       Length of service: 24177 - 45-50 minutes    Number of patients in attendance:11    Referred by: Addictive Behavior Unit Treatment Team    Target symptoms: Alcohol Abuse    Patient's response to treatment: Active Listening      Progress toward goals: Progressing adequately    Interval History: Discussed basic communication skills with an emphasis on how to use I-messages to encourage dialog, decrease conflict, and maintain boundaries     Diagnosis: alcohol use disorder, severe, dependence    Plan: Continue treatment on ABU

## 2019-05-06 NOTE — PROGRESS NOTES
Group Psychotherapy (PhD/LCSW)    Site: [facility]    Clinical status of patient: Intensive Outpatient Program (IOP)    Date: 5/6/2019    Group Focus: Psychodynamic Group Psychotherapy    Length of service: 04387 - 45-50 minutes    Number of patients in attendance: 6    Referred by: Addictive Behavior Unit Treatment Team    Target symptoms: Alcohol Abuse    Patient's response to treatment: Active Listening and Self-disclosurePt     Progress toward goals: Progressing adequately    Interval History: Pt reported that he liked the AA meeting near his house and no longer feels compelled to drive to the [facility] for his meetings. He is very pleased about the time he is saving and he is pleasantly surprised that people on the [facility] at the AA meeting were so welcoming and genuine.      Diagnosis: alcohol use disorder, severe, dependence    Plan: Continue treatment on ABU

## 2019-05-06 NOTE — PLAN OF CARE
05/06/19 1400   Activity/Group Therapy Checklist   Group Relapse Prevention  (1st Step)   Attendance Attended   Follows Direction Followed directions   Group Interactions/Observations Interacted appropriately;Sharing  (poor insight however open to feedback.  Identified problem solving affected by drinking)   Affect/Mood Range Normal range   Affect/Mood Display Appropriate   Goal Progression Progressing

## 2019-05-07 ENCOUNTER — HOSPITAL ENCOUNTER (OUTPATIENT)
Dept: PSYCHIATRY | Facility: HOSPITAL | Age: 64
Discharge: HOME | End: 2019-05-07
Attending: PSYCHIATRY & NEUROLOGY
Payer: COMMERCIAL

## 2019-05-07 DIAGNOSIS — F10.20 ALCOHOL USE DISORDER, SEVERE, IN CONTROLLED ENVIRONMENT: ICD-10-CM

## 2019-05-07 DIAGNOSIS — F10.931 ALCOHOL WITHDRAWAL SYNDROME, WITH DELIRIUM: ICD-10-CM

## 2019-05-07 DIAGNOSIS — F10.20 ALCOHOL USE DISORDER, SEVERE, DEPENDENCE: Primary | ICD-10-CM

## 2019-05-07 LAB
AMPHET+METHAMPHET UR QL: NEGATIVE
BARBITURATES UR QL SCN>200 NG/ML: NEGATIVE
BENZODIAZ UR QL SCN>200 NG/ML: NEGATIVE
BREATH ALCOHOL: 0
BZE UR QL SCN: NEGATIVE
CANNABINOIDS UR QL SCN: NEGATIVE
CREAT UR-MCNC: 27 MG/DL (ref 23–375)
ETHANOL UR-MCNC: <10 MG/DL
METHADONE UR QL SCN>300 NG/ML: NEGATIVE
OPIATES UR QL SCN: NEGATIVE
PCP UR QL SCN>25 NG/ML: NEGATIVE
TOXICOLOGY INFORMATION: NORMAL

## 2019-05-07 PROCEDURE — 90853 GROUP PSYCHOTHERAPY: CPT | Mod: ,,, | Performed by: PSYCHOLOGIST

## 2019-05-07 PROCEDURE — 90853 PR GROUP PSYCHOTHERAPY: ICD-10-PCS | Mod: ,,, | Performed by: PSYCHOLOGIST

## 2019-05-07 PROCEDURE — 90853 GROUP PSYCHOTHERAPY: CPT

## 2019-05-07 PROCEDURE — 80307 DRUG TEST PRSMV CHEM ANLYZR: CPT

## 2019-05-07 PROCEDURE — 90853 GROUP PSYCHOTHERAPY: CPT | Performed by: SOCIAL WORKER

## 2019-05-07 NOTE — PROGRESS NOTES
Group Psychotherapy (PhD/LCSW)    Site: [facility]    Clinical status of patient: Intensive Outpatient Program (IOP)    Date: 5/7/2019    Group Focus: Psychodynamic Group Psychotherapy    Length of service: 88640 - 45-50 minutes    Number of patients in attendance: 6    Referred by: Addictive Behavior Unit Treatment Team    Target symptoms: Alcohol Abuse    Patient's response to treatment: Active Listening and Self-disclosure    Progress toward goals: Progressing adequately    Interval History: Discussed attending a new morning meeting close to home. Liked it. Sleepy today possibly due to meds.    Diagnosis: alcohol use disorder, severe, dependence    Plan: Continue treatment on ABU

## 2019-05-07 NOTE — PROGRESS NOTES
Group Psychotherapy (PhD/LCSW)    Site: [facility]    Clinical status of patient: Intensive Outpatient Program (IOP)    Date: 5/7/2019    Group Focus: DBT-Based Group Therapy    Length of service: 65427 - 45-50 minutes    Number of patients in attendance: 10    Referred by: Addictive Behavior Unit Treatment Team    Target symptoms: Alcohol Abuse    Patient's response to treatment: Active Listening-; Self-disclosure    Progress toward goals: Progressing adequately    Interval History: Session focus was Interpersonal Effectiveness:  Validation (Part 3). Patients were encouraged to focus on validation of themselves by enhancing their ability to hear, understand, and respect themselves.    Diagnosis: alcohol use disorder, severe, dependence,     Plan: Continue treatment on ABU

## 2019-05-07 NOTE — PROGRESS NOTES
Group Psychotherapy (PhD/LCSW)    Site: [facility]    Clinical status of patient: Intensive Outpatient Program (IOP)    Date: 5/7/2019    Group Focus: Disease Model of Addiction      Length of service: 34193 - 45-50 minutes    Number of patients in attendance: 6    Referred by: Addictive Behavior Unit Treatment Team    Target symptoms: Alcohol Abuse    Patient's response to treatment: Active Listening and Self-disclosure    Progress toward goals: Progressing adequately    Interval History: Discussed the basic neuropsychological concepts of the Disease Model of Addiction and their relationship to the phenomena of addction (eg, powerlessness, cravings, euphoric recall, relapse triggers, etc). Noted the way the Disease Model comports with 12-step principles and practices. Noted the value of understanding the Disease Model for sustaining long-term sobriety.     Diagnosis: alcohol use disorder, severe, dependence    Plan: Continue treatment on ABU

## 2019-05-07 NOTE — PLAN OF CARE
05/07/19 1400   Activity/Group Therapy Checklist   Group Music   Attendance Attended   Follows Direction Followed directions   Group Interactions/Observations Interacted appropriately   Affect/Mood Range Normal range   Affect/Mood Display Appropriate   Goal Progression Progressing

## 2019-05-08 ENCOUNTER — HOSPITAL ENCOUNTER (OUTPATIENT)
Dept: PSYCHIATRY | Facility: HOSPITAL | Age: 64
Discharge: HOME | End: 2019-05-08
Attending: PSYCHIATRY & NEUROLOGY
Payer: COMMERCIAL

## 2019-05-08 VITALS — RESPIRATION RATE: 16 BRPM | HEART RATE: 67 BPM | SYSTOLIC BLOOD PRESSURE: 96 MMHG | DIASTOLIC BLOOD PRESSURE: 52 MMHG

## 2019-05-08 DIAGNOSIS — F10.931 ALCOHOL WITHDRAWAL SYNDROME, WITH DELIRIUM: ICD-10-CM

## 2019-05-08 DIAGNOSIS — F10.20 ALCOHOL USE DISORDER, SEVERE, IN CONTROLLED ENVIRONMENT: ICD-10-CM

## 2019-05-08 DIAGNOSIS — F10.20 ALCOHOL USE DISORDER, SEVERE, DEPENDENCE: Primary | ICD-10-CM

## 2019-05-08 LAB
AMPHET+METHAMPHET UR QL: NEGATIVE
BARBITURATES UR QL SCN>200 NG/ML: NEGATIVE
BENZODIAZ UR QL SCN>200 NG/ML: NEGATIVE
BREATH ALCOHOL: 0
BZE UR QL SCN: NEGATIVE
CANNABINOIDS UR QL SCN: NEGATIVE
CREAT UR-MCNC: 29 MG/DL (ref 23–375)
ETHANOL UR-MCNC: <10 MG/DL
ETHYL GLUCURONIDE: NEGATIVE
METHADONE UR QL SCN>300 NG/ML: NEGATIVE
OPIATES UR QL SCN: NEGATIVE
PCP UR QL SCN>25 NG/ML: NEGATIVE
TOXICOLOGY INFORMATION: NORMAL

## 2019-05-08 PROCEDURE — 90853 GROUP PSYCHOTHERAPY: CPT | Mod: ,,, | Performed by: PSYCHIATRY & NEUROLOGY

## 2019-05-08 PROCEDURE — 90853 GROUP PSYCHOTHERAPY: CPT | Performed by: SOCIAL WORKER

## 2019-05-08 PROCEDURE — 80307 DRUG TEST PRSMV CHEM ANLYZR: CPT

## 2019-05-08 PROCEDURE — 99232 SBSQ HOSP IP/OBS MODERATE 35: CPT | Mod: 25,,, | Performed by: PSYCHIATRY & NEUROLOGY

## 2019-05-08 PROCEDURE — 99232 PR SUBSEQUENT HOSPITAL CARE,LEVL II: ICD-10-PCS | Mod: 25,,, | Performed by: PSYCHIATRY & NEUROLOGY

## 2019-05-08 PROCEDURE — 90853 PR GROUP PSYCHOTHERAPY: ICD-10-PCS | Mod: ,,, | Performed by: PSYCHIATRY & NEUROLOGY

## 2019-05-08 PROCEDURE — 90853 GROUP PSYCHOTHERAPY: CPT | Mod: ,,, | Performed by: PSYCHOLOGIST

## 2019-05-08 PROCEDURE — 90853 GROUP PSYCHOTHERAPY: CPT

## 2019-05-08 PROCEDURE — 90853 PR GROUP PSYCHOTHERAPY: ICD-10-PCS | Mod: ,,, | Performed by: PSYCHOLOGIST

## 2019-05-08 NOTE — PROGRESS NOTES
Group Psychotherapy (PhD/LCSW)    Site: [facility]    Clinical status of patient: Intensive Outpatient Program (IOP)    Date: 5/8/2019    Group Focus: DBT-Based Group Therapy    Length of service: 86230 - 45-50 minutes    Number of patients in attendance: 11    Referred by: Addictive Behavior Unit Treatment Team    Target symptoms: Alcohol Abuse    Patient's response to treatment: Active Listening-; Self-disclosure    Progress toward goals: Progressing adequately    Interval History: Session focus was Interpersonal Effectiveness:  DEAR MAN.  Patients were encouraged to exercise skillfulness during interactions by using this framework.    Diagnosis: alcohol use disorder, severe, dependence,     Plan: Continue treatment on ABU

## 2019-05-08 NOTE — PROGRESS NOTES
"5/8/2019 8:43 AM  Donald Warren Abadie  1955  [id number]    [facility] Intensive Outpatient Program Progress Note    STATUS:  Intensive Outpatient Program (IOP)    SUBJECTIVE:   Chief Complaint: substance abuse    History of Present Illness:   Donald Warren Abadie is a 63 y.o. male with a past psychiatric history of anxiety, unspecified , who presented to [facility] IOP due to alcohol abuse.     Patient was recently seen by Psychiatry CL service after a recent hospital stay, per their note 3/27/19:  "As per the patient and the daughter, the patient has been drinking since he was 14 years old. Pt began drinking heavily after retiring five years ago and as per the daughter, it has worsened over the past year. During the past two months he has left the house only to get more alcohol and has not been participating in his normal activities. The patient says that he was drinking about 18 drinks per day. Attempted sobriety for the first time 8 weeks ago and experienced tremors and withdrawal symptoms (denies DTs) and resumed drinking. The patient states that he tried quitting alcohol 3.5 weeks ago because he was "feeling bad" and also accidentally overdosed on his BB and flecainide to feel better, which resulted in [place] [facility] [place]. He endorsed sx of EtOH withdrawal on initial presentation, including anxiety, diaphoresis, hallucinations, and hallucinations.      The daughter believes that he is drinking more because he has nothing to occupy since retiring. She also thinks that his anxiety and depression have gotten worse and he uses drinking as a coping mechanism. The patient has had anxiety all his life, but it has worsened over the past few years. Anxiety is worse at night and reports poor sleep as a result. His depression has also worsened. He has tried Ativan in the past but was taken off of it because, as daughter states, he was addicted to it. He has also tried Effexor, Lexapro and Cymbalta. The daughter " "states that he was never on them long enough for them to work. She saw a difference with Cymbalta but thinks he stopped taking it because of the side effects. "    =================================    - Patient confirmed psych hx in above report. Was admitted to the hospital with extensive ICU stay from 3/15/19-3/29/19, was admitted following an accidental overdose of HTN medications, per family alcohol involved with the incident and patient showed signs of alcohol withdrawal when admitted.     Pleasant, linear throughout interview. Says only was drinking beer, confirms 18 beers per dayno liquor or wine. Says once he had no work obligations he started drinking all day (vs drinks after work). Drank 12 oz bud lights. Last drink was 3/15/19 after which patient was admitted to hospital (see above). Cites "dying twice" in the ICU as a wake up call - "I don't want to die!". Confirmed withdrawal symptoms previously reported when he tried to quit - says the first time stopped 2 weeks cold turkey (longest period sobriety since 14 y/o), second time was one week also with withdrawal symptoms. Says both times eventually the withdrawal symptoms went away but took about a week. Denies any seizures at that time, denies hx seizures. Says has been drinking daily since 14 y/o but denies any issues with work - "I never missed a day of work". Says after long term is when he started to acknowledge possibility of a problem "you better slow down". Denies alcohol related social problems. Cites medical problems and ICU stay as main wake-up calls.     Reports ongoing anxiety worse at night, says often affects his sleep. Wide ranging anxiety, says he can wake from sleep anxious and be unable to return to sleep. Started Remeron since discharge with planned increase to 15 mg soon. Feels it is helping with sleep "for the most part" and thinks he has some daytime benefits as well regarding anxiety. Denies mood issues. Relates to long term, says " "when he was working he was "wore out" at home and didn't have trouble with generalized anxiety or insomnia. Denies any hx SI/HI. During aforementioned withdrawals denies any periods of disorientation or AVH, though collateral reports indicate likely hx DTs. Does acknowledge he does not remember much of ICU stay (chart review with possible AVH). Denies illicit drug use, denies hx addiction issues with illicits.    Denies recent mood issues. Reports sleep and anxiety has improved since starting Remeron. Otherwise pan negative on SIGECAPS since recent hospital discharge.     =====================    INTERVAL HX    4/15/19  Patient doing well today. Had a good weekend, worked on pool this weekend. Mood stable and good. Eating and sleeping well at home. Reports increased appetite with Remeron. Denies other side effects. Denies SI/HI/AVH. Afternoon AA meetings going well, patient attending daily. Feels he has been surprised he enjoys AA as much as he does. Good insight to benefits of daily attendance even after maintaining sobriety, discussed people at meetings that have been sober ten years. In process of finding a sponsor. Group here going well. Denies cravings. Says anxiety is improving "everything is getting better". Patient with no other questions for MD, and does not have any particular topics he would like to discuss when offered.     Toxicology Screen: Alcohol biomarkers 4/12 negative; Utox 4/12 negative; breath alcohol 4/12 negative   Medication Side Effects: None  Cravings: no  No other acute psychiatric issues reported at this time.    4/17/19  Patient doing well. Reports mood doing good and stable. Says son-in-law came in for family today, explained for privacy reasons son-in-law unable to attend group meetings with other patients, patient voiced understanding. Says main result of family day is patient would like to address his hurt knee sooner rather than later. Picked up rx, plans to trial naltrexone at home " this weekend in case he gets nauseous. AA meetings going well, group here going well. Denies SI/HI/AVH. Plans to address knee as soon as done with program. Denies cravings , does report eating more. Sleeping well at home. Reports night time anxiety continues to improve. Denies medication side effects outside of possible increased appetite. Patient with no other questions for MD, and does not have any particular topics he/she would like to discuss when offered.    Toxicology Screen: Alcohol biomarkers 4/12 negative, 4/15 in process; Utox 4/15 negative; breath alcohol 4/16 negative   Medication Side Effects: increased appetite, though likely also due to sobriety and decreased etoh calories  Cravings: no  No other acute psychiatric issues reported at this time.    4/22/19  Patient doing well today. Reports had a good weekend. Didn't trial naltrexone over the weekend, plans to soon but plans to start by taking a quarter to minimize side effects. Patient had crawfish boil over the weekend, was able to avoid drinking and denies cravings. Denies physical complaints outside of knee pain. Eating and sleeping well. Planning to move remeron dose up to avoid morning grogginess. Denies SI/HI/AVH. AA meetings going well, group here going well. Denies medication side effects otherwise. Patient with no other questions for MD, and does not have any particular topics he/she would like to discuss when offered.    Toxicology Screen: Alcohol biomarkers 4/15 negative; Utox 4/17 negative; breath alcohol 4/18 negative   Medication Side Effects: denied  Cravings: no  No other acute psychiatric issues reported at this time.    4/24/19  Patient doing well, eating well at home. Reports trouble sleeping last night. Says Remeron helps him sleep but feels he may need to take earlier in the evening to time effect, advised that is fine and he can time as he pleases in the evening. Pleasant discussion about jazz fest, knee/foot issues. Reports good  mood and night time anxiety not an issue or keeping him from sleeping, denies daytime anxiety. Denies SI/HI/AVH. Denies medication side effects. Endorses knee pain. AA going well, good insight. Group here going well. Denies cravings, patient says memories of ICU stay keep him from wanting. Patient didn't trail naltrexone over the weekend, will revisit on Friday for possible weekend trial.    Toxicology Screen: Alcohol biomarkers 4/15 negative. 4/22 pending; Utox 4/22 negative; breath alcohol 4/23 negative   Medication Side Effects: denied  Cravings: no  No other acute psychiatric issues reported at this time.    4/26/19  Patient doing well today. Calm, conversational, pleasant. Arrangements for knee surgery after this program ongoing. Sleep continues to improve. Remeron has been helping his sleep and he has been able to take the pill at a time better for him (earlier). Discussed SSRI, patient. Denies SI/HI/AVH. AA meetings going well, group here going well. Denies mood issues or major fluctuations. Denies medication side effects. Knee pain but no other physical complaints. Reports anxiety much improved since admit, denies any night time anxiety that affects sleep. Denies cravings. Patient with no other questions for MD, and does not have any particular topics he/she would like to discuss when offered.    Toxicology Screen: Alcohol biomarkers 4/22 negative; Utox 4/24 negative; breath alcohol 4/25 negative   Medication Side Effects: denied  Cravings: no  No other acute psychiatric issues reported at this time.    4/29/19  Patient doing well today. Calm, conversational, pleasant. Patient reports he has been eating more sweets, had BG at home in 400s. Says last was ~200 when measured, will measure at home. Hadn't checked in a couple weeks, has discussed this with his PCP (Dr. Larry) in past and has an appointment with him in the next two weeks. Denies any physical symptoms symptoms during this. Endorses good and stable  "mood. Denies SI/HI/AVH. Remeron continues to be helpful, taking it earlier to avoid daytime grogginess. Concern for appetite effect as above, will continue to monitor. Reports anxiety continues to be improved, with little during daytime or night (vs staying up with anxiety previously). No alcohol cravings, but reports cravings for sweets. AA meetings going well, group here going well.     Toxicology Screen: Alcohol biomarkers 4/22 negative 4/29 in process; Utox 4/26 negative 4/29 in process; breath alcohol 4/29 negative   Medication Side Effects: denied  Cravings: no  No other acute psychiatric issues reported at this time.    05/01/19  Medications adjusted by Dr. Elaine yesterday as pt had reported weight gain. Remeron was discontinued and pt was started on trazodone 50 mg for sleep qhs. Pt is calm, cooperative, pleasant and conversational. He states that he is "feeling good" this morning and reports his mood has been good. Pt discusses his blood sugars being high and how he needs to follow up with his PCP; also discussed how he was worried about the increased dose of remeron was impacting his appetite and causing his sugars to rise. He states that Dr. Elaine changed his medication to trazodone and feels that it is working well. He reports sleeping aell and denies any grogginess, and expresses that he. He states that his anxiety is doing well and has no complaints. Denies grogginess, feels that trazodone has been helpful.  Denies alcohol cravings, AA meetings going well, groups going well. Denies SI/HI.     Toxicology Screen: Alcohol biomarkers 4/22 negative 4/29 in process; Utox 4/26 negative 4/29 neg; breath alcohol 4/29 negative, utox 501 neg and breath alcohol neg   Medication Side Effects: denied  Cravings: no  No other acute psychiatric issues reported at this time     05/03/2017  Pt reports that he has been doing well, an describes his mood as "good". Blood sugars still high but coming down. Discusses " "difficulty fixing house. Sleeping well, states he has been sleeping just as good as the remeron. Appetite is "good" .Pt states that his anxiety is "almost gone". Attending AA meetings and says it's going well, says groups here are going well. Denies cravings and states that whenever he sees a beer he thinks about being in the ICU and has no desire to be back in this position. Discusses weekend plans of working on his house. Denies SI/HI    Toxicology Screen: Alcohol biomarkers 4/22 negative 4/29 in process; Utox 4/26 negative 4/29 neg; breath alcohol 4/29 negative   Medication Side Effects: denied  Cravings: denied  No other acute psychiatric issues reported at this time     5/06/2017  Pt reports doing well, and states his mood is "good". Discussed his weekend, spent time with his family and doing home repairs. Denies alcohol cravings, denies relapses. Attending AA meetings over the weekend which have been going well. Groups in the [facility] going well without issues. . Discussed decreasing blood sugars which pt is happy about and he is looking forward to telling his PCP about his progress at his appointment Friday.Patient education provided-discussed how addiction is a chronic disease. States the trazodone continues to work well for sleep and denies SE. Denies SI/HI/anxiety.  No cravings, relapses. AA meetings going well.    Toxicology Screen: Alcohol biomarkers 4/22 negative 4/29 in process; breath alcohol negative and UDS negative   Medication Side Effects: denied  Cravings: denied  No other acute psychiatric issues reported at this time     5/08/2019  Patient with medication readjustments since last spoke to him; pt was advised to only take trazodone 25-50 mg  on a PRN basis for sleep as there was some concern it was causing him to be too groggy in the mornings and BP low. Pt states that he took "a little less than half a pill" last night so he could sleep. He states that he was afraid of "quitting cold turkey". Pt " "states that his BP was low this AM and that when he walked from the parking garage to the clinic he felt "wore out". He denies light headedness of dizziness. Discussed that there are unlikely to be negative sx of discontinuing trazodone suddenly att that dose and re-emphasized utilizing melatonin for sleep as needed as it is unlikely to effect his BP. Pt discusses how daughter was worried that he was drinking again while he was meeting with Dr. Elaine because of seemed "tired". Pt adamantly denies drinking or havign cravings. He states that AA meetings have been going well and has been attending meetings closer to his home.    Denies cravings, relapses. Groups in [facility] going "pretty good".     Toxicology Screen: Alcohol biomarkers 5/06 neg, 5/06 and 5/07 breath alcohol negative and UDS negative   Medication Side Effects: denied  Cravings: denied  No other acute psychiatric issues reported at this time       Allergies:  No known drug allergies  Medical/Surgical History:  Past Medical History:   Diagnosis Date    A-fib     Alcohol abuse     Arthritis     Chronic gout     Diabetes mellitus     DM (diabetes mellitus) 11/16/2016    Fatty liver     Hyperlipidemia     Renal cell cancer 2014     Past Surgical History:   Procedure Laterality Date    ABSCESS DRAINAGE      perirectal    COLONOSCOPY      FISTULOTOMY N/A 5/19/2015    [facility] at [facility] OR [address] [place] [address]    HAND SURGERY Left     HEMORRHOIDECTOMY N/A 5/19/2015    [facility]    KIDNEY SURGERY      partial left kidney removal - [place]    PARTIAL NEPHRECTOMY Left August 2014    ROBOTIC ASSISTED LAPAROSCOPIC NEPHRECTOMY PARTIAL Left 8/28/2014    Performed by Fabian Estevez MD at [facility] OR [address] [place] [address]     OBJECTIVE:     Current Medications:   Infusions:    Scheduled:    PRN:    Home Medications:  Prior to Admission medications    Medication Sig Start Date End Date Taking? Authorizing Provider "   allopurinol (ZYLOPRIM) 100 MG tablet TAKE 2 TABLETS BY MOUTH DAILY 2/22/19   Diana Hemphill MD   apixaban (ELIQUIS) 5 mg Tab Take 1 tablet (5 mg total) by mouth 2 (two) times daily. 3/4/19   Uriel Tolentino MD   aspirin 81 MG Chew Take 1 tablet (81 mg total) by mouth once daily. 3/4/19 3/3/20  Uriel Tolentino MD   cyanocobalamin (VITAMIN B-12) 1000 MCG tablet Take 1 tablet (1,000 mcg total) by mouth once daily. 3/30/19   DEAN Vigil MD   fenofibrate 160 MG Tab Take 1 tablet (160 mg total) by mouth every evening. 10/29/18   Bacilio Larry MD   flecainide (TAMBOCOR) 150 MG Tab Take 1 tablet (150 mg total) by mouth 2 (two) times daily. 3/7/19 4/9/19  Uriel Tolentino MD   folic acid (FOLVITE) 1 MG tablet Take 1 tablet (1 mg total) by mouth once daily. 3/30/19 3/29/20  DEAN Vigil MD   metoprolol tartrate (LOPRESSOR) 25 MG tablet NOT CURRENTLY TAKING Take 0.5 tablets (12.5 mg total) by mouth 2 (two) times daily. 3/29/19 3/28/20  DEAN Vigil MD   mirtazapine (REMERON) 15 MG tablet Take 1 tablet (15 mg total) by mouth every evening. 4/9/19 4/8/20  Bacilio Larry MD   multivitamin (THERAGRAN) tablet Take 1 tablet by mouth once daily. 3/30/19   DEAN Vigil MD   omega-3 acid ethyl esters (LOVAZA) 1 gram capsule Take 2 g by mouth 2 (two) times daily.    Historical Provider, MD   pantoprazole (PROTONIX) 40 MG tablet Take 1 tablet (40 mg total) by mouth once daily. 3/30/19 3/29/20  DEAN Vigil MD   ranitidine (ZANTAC) 150 MG capsule Take 150 mg by mouth 2 (two) times daily.    Historical Provider, MD   rosuvastatin (CRESTOR) 10 MG tablet Take 1 tablet (10 mg total) by mouth once daily. 3/4/19 3/3/20  Uriel Tolentino MD   thiamine 100 MG tablet Take 1 tablet (100 mg total) by mouth once daily. 3/30/19   DEAN Vigil MD     NOT CURRENTLY TAKING METOPROLOL    Vital Signs:  There were no vitals filed for this visit.      Mental Status Exam:  Appearance: unremarkable, age  "appropriate, casually dressed, good grooming  Level of Consciousness: alert  Behavior/Cooperation: friendly and cooperative  Psychomotor: unremarkable   Speech: normal tone, normal rate, normal pitch, normal volume, accented  Language: english, fluid  Orientation: grossly oriented  Attention Span/Concentration: intact  Memory: grossly intact  Mood: "good"  Affect: normal  Thought Process: linear, normal and logical  Associations: normal and logical  Thought Content: normal, no suicidality, no homicidality, delusions, or paranoia volunteered. Denies SI/HI when asked directly  Fund of Knowledge: Aware of current events   Abstraction: intact to conversation  Insight: good  Judgment: good    Labs/Imaging/Studies:   Recent Results (from the past 24 hour(s))   Toxicology screen, urine    Collection Time: 05/07/19 10:47 AM   Result Value Ref Range    Alcohol, Urine <10 <10 mg/dL    Benzodiazepines Negative     Methadone metabolites Negative     Cocaine (Metab.) Negative     Opiate Scrn, Ur Negative     Barbiturate Screen, Ur Negative     Amphetamine Screen, Ur Negative     THC Negative     Phencyclidine Negative     Creatinine, Random Ur 27.0 23.0 - 375.0 mg/dL    Toxicology Information SEE COMMENT    POCT BREATH ALCOHOL TEST    Collection Time: 05/07/19 10:48 AM   Result Value Ref Range    Breath Alcohol 0.000       ASSESSMENT     Donald Warren Abadie is a 63 y.o. male with a past psychiatric history of anxiety, who presented to [facility] IOP due to alcoho abuse. 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 Patient and family describe worsening alcohol intake leading to significant social isolation. Patient with history legal complications (DWI) from alcohol use. Patient very recently admitted for an extensive ICU stay after accidentally overdosing on HTN medications while intoxicated from alcohol use. At presentation to the ER patient was noted to have " multiple alcohol withdrawal symptoms as well reports from family consistent with Delirium Tremens. Patient is also high risk for mood disorder if he were to relapse, with multiple static factors (, older white male with medical comorbidities and hx substance abuse) that would contribute to risk of completed suicide. Pt would benefit from continued IOP as medication adjustments are ongoing and pt will need to have surgery in the near future, he would benefit from ongoing structure of the program to prevent relapse.    IMPRESSION  - alcohol use disorder, severe  - anxiety, unspecified    PLAN     · continue patient on ABU protocol.  · Breathalyzer daily and urine toxicology at least three times per week.  · VS daily x 3 days.  · CBC and CMP unremarkable, LFTs WNL  · Patient counseled on abstinence from alcohol.    Medications: Continue current medications.  · Recommend melatonin nightly qhs for sleep.   ·  Pt reports low BP and feeling tired with taking qhs trazodone. Recommend utilizing Trazodone 25-50 mg PO nightly for insomnia only if melatonin is ineffective.  · Discussed possibility of SSRI or other antidepressant for anxiety with patient at admit. He is open to the possibility but patient and MD agreed to revisit this option as he progresses in program - at admit had recent  increase in Remeron dosage (7.5 to 15 nightly) and patient feels he is sleeping well with anxiety lessened and restricted to night time only.  Revisited SSRI 4/26/19, patient but felt well controlled. Pt changed to trazodone 4/30 2/2 increased appetite.  Patient amenable to continue trazodone and denies any ongoing anxiety  · Continue naltrexone 50 mg (Rx'd 4/16)  · Medications for alcohol use disorder were discussed including acamprosate, naltrexone and disulfiram.     Status: Continue treatment on [facility]    Taylor Tom MD   [employer] [facility] PGY2

## 2019-05-08 NOTE — PLAN OF CARE
05/08/19 1400   Activity/Group Therapy Checklist   Group Goals/Reflection   Attendance Attended   Follows Direction Followed directions   Group Interactions/Observations Interacted appropriately   Affect/Mood Range Normal range   Affect/Mood Display Appropriate   Goal Progression Progressing

## 2019-05-08 NOTE — PROGRESS NOTES
Group Psychotherapy (MD)     Site: [facility]     Clinical status of patient: Intensive Outpatient Program (IOP)     Date: 5/8/19     Group Focus: Medical and Neuropsychiatric Bases of Psychiatric Disease and Addiction: smart phone addiction     Length of service: 34813 - 45-50 minutes     Number of patients in attendance: 12     Referred by: Addictive Behavioral Unit Treatment Team     Target symptoms: Internet and smart phone addiction, negative emotional effect of social media, inattention     Patient's response to treatment: Active Listening       Progress toward goals: progressing adequately    content of Therapy: Discussed positive and negative effects of smart phone and social media use. Discussed at length addictive and inattention related aspects of use. Discussed possible effects of social media use on mood and self esteem. Discussed ways to optimize positive benefits of smart phone, social media and internet use, while curbing use over all and avoiding addictive aspects. Brainstormed practical ways to limit use.     Diagnosis: Alcohol Use Disorder, severe, dependence     Plan: Continue treatment on [facility]

## 2019-05-08 NOTE — PATIENT CARE CONFERENCE
[facility]U Staffing:   Alcohol use disorder, severe  Anxiety, unspecified        1. Pt is attending all groups    2. Pt is attending all meetings  3. Pt 's has minimally supportive family  4. Pt has completed spiritual assessment    5. Pt will present life story    6. Pt will present Step One assignment    7. Pt is exploring issues related to relapse  prevention; spirituality; stress management; improved communication skills; assertiveness training; poor self-esteem; disease concepts; cross addictions; and, work related issues    8. D/C date: TBD     Staff discussed pt's attending a meeting closer to home and engaging in group. Staffing discussed issues with adjusting to sleep medications, stopping trazodone,  appearing less anxious, and tolerating naltrexone. Staffing also discussed pt gaining insight.       Problem: Alcohol use disorder, severe  Goal: Address in 12 step meetings and group and individual sessions    Objective Measure: participation in groups, self report, length of sobriety, and relapse prevention plan  Time: Prior to discharge    Progress: Pt is attending groups and sessions       Problem: Anxiety, unspecified   Goal: Address in 12 step meetings and group and individual sessions    Objective Measure: participation in groups, self report, length of sobriety, and relapse prevention plan  Time: Prior to discharge    Progress: Pt is attending groups and sessions       Staff members present:   MD Dr. Vaishali Cross, Resident  Dr. Chavez, PhD  Patricia Taylor, [facility]  Yonathan Guidry, [facility]  Chano Cortez, [facility]  Crystal Welch RN

## 2019-05-08 NOTE — PROGRESS NOTES
Group Psychotherapy (PhD/LCSW)    Site: [facility]    Clinical status of patient: Intensive Outpatient Program (IOP)    Date: 5/8/2019    Group Focus: Psychodynamic Group Psychotherapy    Length of service: 20823 - 45-50 minutes    Number of patients in attendance: 6    Referred by: Addictive Behavior Unit Treatment Team    Target symptoms: Alcohol Abuse    Patient's response to treatment: Active Listening and Self-disclosure    Progress toward goals: Progressing adequately    Interval History: Discussed improved physical condition with sobriety. Still sedated from sleeping pill. Will try with no sleep aid tonight.    Diagnosis: alcohol use disorder, severe, dependence    Plan: Continue treatment on ABU

## 2019-05-09 ENCOUNTER — HOSPITAL ENCOUNTER (OUTPATIENT)
Dept: PSYCHIATRY | Facility: HOSPITAL | Age: 64
Discharge: HOME | End: 2019-05-09
Attending: PSYCHIATRY & NEUROLOGY
Payer: COMMERCIAL

## 2019-05-09 DIAGNOSIS — F10.20 ALCOHOL USE DISORDER, SEVERE, DEPENDENCE: Primary | ICD-10-CM

## 2019-05-09 DIAGNOSIS — F10.20 ALCOHOL USE DISORDER, SEVERE, IN CONTROLLED ENVIRONMENT: ICD-10-CM

## 2019-05-09 DIAGNOSIS — F10.931 ALCOHOL WITHDRAWAL SYNDROME, WITH DELIRIUM: ICD-10-CM

## 2019-05-09 LAB
BREATH ALCOHOL: 0
ETHYL GLUCURONIDE: NEGATIVE

## 2019-05-09 PROCEDURE — 90853 GROUP PSYCHOTHERAPY: CPT | Performed by: SOCIAL WORKER

## 2019-05-09 PROCEDURE — 90853 GROUP PSYCHOTHERAPY: CPT

## 2019-05-09 PROCEDURE — 90853 PR GROUP PSYCHOTHERAPY: ICD-10-PCS | Mod: ,,, | Performed by: PSYCHOLOGIST

## 2019-05-09 PROCEDURE — 90853 GROUP PSYCHOTHERAPY: CPT | Mod: ,,, | Performed by: PSYCHOLOGIST

## 2019-05-09 NOTE — PROGRESS NOTES
Group Psychotherapy (PhD/LCSW)    Site: [facility]    Clinical status of patient: Intensive Outpatient Program (IOP)    Date: 5/9/2019    Group Focus: Psychodynamic Group Psychotherapy    Length of service: 97024 - 45-50 minutes    Number of patients in attendance: 6    Referred by: Addictive Behavior Unit Treatment Team    Target symptoms: Alcohol Abuse    Patient's response to treatment: Active Listening and Self-disclosure    Progress toward goals: Progressing adequately    Interval History: Did not take sleeping medicine last night. Feels better today. Recounts what he  [place] at AA last night.    Diagnosis: alcohol use disorder, severe, dependence    Plan: Continue treatment on ABU

## 2019-05-09 NOTE — PROGRESS NOTES
Group Psychotherapy (PhD/LCSW)    Site: [facility]    Clinical status of patient: Intensive Outpatient Program (IOP)    Date: 5/9/2019    Group Focus: DBT-Based Group Therapy    Length of service: 88541 - 45-50 minutes    Number of patients in attendance: 12    Referred by: Addictive Behavior Unit Treatment Team    Target symptoms: Alcohol Abuse    Patient's response to treatment: Active Listening-; Self-disclosure    Progress toward goals: Progressing adequately    Interval History: Session focus was Interpersonal Effectiveness:  GIVE and FAST.  Patients were introduced to skills to promote active listening, self-respect, and attendance to values.    Diagnosis: alcohol use disorder, severe, dependence,     Plan: Continue treatment on ABU

## 2019-05-09 NOTE — PLAN OF CARE
05/09/19 1400   Activity/Group Therapy Checklist   Group Relapse Prevention  (Step Work )   Attendance Attended   Follows Direction Followed directions   Group Interactions/Observations Interacted appropriately   Affect/Mood Range Normal range   Affect/Mood Display Appropriate   Goal Progression Progressing

## 2019-05-09 NOTE — PLAN OF CARE
05/09/19 1000   Activity/Group Therapy Checklist   Group Goals/Reflection   Attendance Attended   Follows Direction Followed directions   Group Interactions/Observations Interacted appropriately   Affect/Mood Range Normal range   Affect/Mood Display Appropriate   Goal Progression Progressing

## 2019-05-10 ENCOUNTER — OFFICE VISIT (OUTPATIENT)
Dept: INTERNAL MEDICINE | Facility: CLINIC | Age: 64
End: 2019-05-10
Payer: COMMERCIAL

## 2019-05-10 ENCOUNTER — HOSPITAL ENCOUNTER (OUTPATIENT)
Dept: PSYCHIATRY | Facility: HOSPITAL | Age: 64
Discharge: HOME | End: 2019-05-10
Attending: PSYCHIATRY & NEUROLOGY
Payer: COMMERCIAL

## 2019-05-10 VITALS
SYSTOLIC BLOOD PRESSURE: 110 MMHG | SYSTOLIC BLOOD PRESSURE: 116 MMHG | HEART RATE: 72 BPM | BODY MASS INDEX: 30.01 KG/M2 | HEART RATE: 72 BPM | HEIGHT: 65 IN | RESPIRATION RATE: 16 BRPM | OXYGEN SATURATION: 97 % | TEMPERATURE: 98 F | DIASTOLIC BLOOD PRESSURE: 78 MMHG | DIASTOLIC BLOOD PRESSURE: 68 MMHG | WEIGHT: 180.13 LBS

## 2019-05-10 DIAGNOSIS — I48.91 ATRIAL FIBRILLATION WITH RVR: ICD-10-CM

## 2019-05-10 DIAGNOSIS — F10.11 H/O ETOH ABUSE: Primary | ICD-10-CM

## 2019-05-10 DIAGNOSIS — Z79.4 TYPE 2 DIABETES MELLITUS WITHOUT COMPLICATION, WITH LONG-TERM CURRENT USE OF INSULIN: ICD-10-CM

## 2019-05-10 DIAGNOSIS — Z85.528 H/O RENAL CELL CANCER: ICD-10-CM

## 2019-05-10 DIAGNOSIS — E11.9 TYPE 2 DIABETES MELLITUS WITHOUT COMPLICATION, WITH LONG-TERM CURRENT USE OF INSULIN: ICD-10-CM

## 2019-05-10 DIAGNOSIS — F10.931 ALCOHOL WITHDRAWAL SYNDROME, WITH DELIRIUM: ICD-10-CM

## 2019-05-10 DIAGNOSIS — F10.20 ALCOHOL USE DISORDER, SEVERE, IN CONTROLLED ENVIRONMENT: Primary | ICD-10-CM

## 2019-05-10 LAB
AMPHET+METHAMPHET UR QL: NEGATIVE
BARBITURATES UR QL SCN>200 NG/ML: NEGATIVE
BENZODIAZ UR QL SCN>200 NG/ML: NEGATIVE
BREATH ALCOHOL: 0
BZE UR QL SCN: NEGATIVE
CANNABINOIDS UR QL SCN: NEGATIVE
CREAT UR-MCNC: 74 MG/DL (ref 23–375)
ETHANOL UR-MCNC: <10 MG/DL
METHADONE UR QL SCN>300 NG/ML: NEGATIVE
OPIATES UR QL SCN: NEGATIVE
PCP UR QL SCN>25 NG/ML: NEGATIVE
TOXICOLOGY INFORMATION: NORMAL

## 2019-05-10 PROCEDURE — 3008F PR BODY MASS INDEX (BMI) DOCUMENTED: ICD-10-PCS | Mod: CPTII,S$GLB,, | Performed by: INTERNAL MEDICINE

## 2019-05-10 PROCEDURE — 99232 SBSQ HOSP IP/OBS MODERATE 35: CPT | Mod: ,,, | Performed by: PSYCHIATRY & NEUROLOGY

## 2019-05-10 PROCEDURE — 90853 GROUP PSYCHOTHERAPY: CPT | Performed by: SOCIAL WORKER

## 2019-05-10 PROCEDURE — 3074F SYST BP LT 130 MM HG: CPT | Mod: CPTII,S$GLB,, | Performed by: INTERNAL MEDICINE

## 2019-05-10 PROCEDURE — 90853 GROUP PSYCHOTHERAPY: CPT | Mod: ,,, | Performed by: PSYCHOLOGIST

## 2019-05-10 PROCEDURE — 3074F PR MOST RECENT SYSTOLIC BLOOD PRESSURE < 130 MM HG: ICD-10-PCS | Mod: CPTII,S$GLB,, | Performed by: INTERNAL MEDICINE

## 2019-05-10 PROCEDURE — 99232 PR SUBSEQUENT HOSPITAL CARE,LEVL II: ICD-10-PCS | Mod: ,,, | Performed by: PSYCHIATRY & NEUROLOGY

## 2019-05-10 PROCEDURE — 3008F BODY MASS INDEX DOCD: CPT | Mod: CPTII,S$GLB,, | Performed by: INTERNAL MEDICINE

## 2019-05-10 PROCEDURE — 99999 PR PBB SHADOW E&M-EST. PATIENT-LVL III: CPT | Mod: PBBFAC,,, | Performed by: INTERNAL MEDICINE

## 2019-05-10 PROCEDURE — 99999 PR PBB SHADOW E&M-EST. PATIENT-LVL III: ICD-10-PCS | Mod: PBBFAC,,, | Performed by: INTERNAL MEDICINE

## 2019-05-10 PROCEDURE — 90853 PR GROUP PSYCHOTHERAPY: ICD-10-PCS | Mod: ,,, | Performed by: PSYCHOLOGIST

## 2019-05-10 PROCEDURE — 3044F PR MOST RECENT HEMOGLOBIN A1C LEVEL <7.0%: ICD-10-PCS | Mod: CPTII,S$GLB,, | Performed by: INTERNAL MEDICINE

## 2019-05-10 PROCEDURE — 99213 OFFICE O/P EST LOW 20 MIN: CPT | Mod: S$GLB,,, | Performed by: INTERNAL MEDICINE

## 2019-05-10 PROCEDURE — 3078F PR MOST RECENT DIASTOLIC BLOOD PRESSURE < 80 MM HG: ICD-10-PCS | Mod: CPTII,S$GLB,, | Performed by: INTERNAL MEDICINE

## 2019-05-10 PROCEDURE — 3044F HG A1C LEVEL LT 7.0%: CPT | Mod: CPTII,S$GLB,, | Performed by: INTERNAL MEDICINE

## 2019-05-10 PROCEDURE — 99213 PR OFFICE/OUTPT VISIT, EST, LEVL III, 20-29 MIN: ICD-10-PCS | Mod: S$GLB,,, | Performed by: INTERNAL MEDICINE

## 2019-05-10 PROCEDURE — 90853 GROUP PSYCHOTHERAPY: CPT

## 2019-05-10 PROCEDURE — 3078F DIAST BP <80 MM HG: CPT | Mod: CPTII,S$GLB,, | Performed by: INTERNAL MEDICINE

## 2019-05-10 PROCEDURE — 80307 DRUG TEST PRSMV CHEM ANLYZR: CPT

## 2019-05-10 NOTE — PROGRESS NOTES
Group Psychotherapy (PhD/LCSW)    Site: [facility]    Clinical status of patient: Intensive Outpatient Program (IOP)    Date: 5/10/2019    Group Focus: Stress Management    Length of service: 12963 - 45-50 minutes    Number of patients in attendance: 11    Referred by: Addictive Behavior Unit Treatment Team    Target symptoms: Alcohol Abuse    Patient's response to treatment: Active Listening    Progress toward goals: Progressing adequately    Interval History: Group learned and practiced mindfulness techniques (rain meditation, eating) to improve present-moment awareness, impulsive behavior tendencies, and tolerance of various emotional states.    Diagnosis: Alcohol Use Disorder    Plan: Continue treatment on ABU

## 2019-05-10 NOTE — PLAN OF CARE
05/10/19 1300   Activity/Group Therapy Checklist   Group Relapse Prevention  (Step Work )   Attendance Attended   Follows Direction Followed directions   Group Interactions/Observations Interacted appropriately   Affect/Mood Range Normal range   Affect/Mood Display Appropriate   Goal Progression Progressing

## 2019-05-10 NOTE — PROGRESS NOTES
"5/10/2019 8:43 AM  Donald Warren Abadie  1955  [id number]    [facility] Intensive [facility]patient [facility] Progress Note    STATUS:  Intensive Outpatient Program (IOP)    SUBJECTIVE:   Chief Complaint: substance abuse    History of Present Illness:   Donald Warren Abadie is a 63 y.o. male with a past psychiatric history of anxiety, unspecified , who presented to [facility] IOP due to alcohol abuse.     Patient was recently seen by Psychiatry CL service after a recent hospital stay, per their note 3/27/19:  "As per the patient and the daughter, the patient has been drinking since he was 14 years old. Pt began drinking heavily after retiring five years ago and as per the daughter, it has worsened over the past year. During the past two months he has left the house only to get more alcohol and has not been participating in his normal activities. The patient says that he was drinking about 18 drinks per day. Attempted sobriety for the first time 8 weeks ago and experienced tremors and withdrawal symptoms (denies DTs) and resumed drinking. The patient states that he tried quitting alcohol 3.5 weeks ago because he was "feeling bad" and also accidentally overdosed on his BB and flecainide to feel better, which resulted in his [facility] admission. He endorsed sx of EtOH withdrawal on initial presentation, including anxiety, diaphoresis, hallucinations, and hallucinations.      The daughter believes that he is drinking more because he has nothing to occupy since retiring. She also thinks that his anxiety and depression have gotten worse and he uses drinking as a coping mechanism. The patient has had anxiety all his life, but it has worsened over the past few years. Anxiety is worse at night and reports poor sleep as a result. His depression has also worsened. He has tried Ativan in the past but was taken off of it because, as daughter states, he was addicted to it. He has also tried Effexor, Lexapro and Cymbalta. The daughter " "states that he was never on them long enough for them to work. She saw a difference with Cymbalta but thinks he stopped taking it because of the side effects. "    =================================    - Patient confirmed psych hx in above report. Was admitted to the hospital with extensive ICU stay from 3/15/19-3/29/19, was admitted following an accidental overdose of HTN medications, per family alcohol involved with the incident and patient showed signs of alcohol withdrawal when admitted.     Pleasant, linear throughout interview. Says only was drinking beer, confirms 18 beers per dayno liquor or wine. Says once he had no work obligations he started drinking all day (vs drinks after work). Drank 12 oz bud lights. Last drink was 3/15/19 after which patient was admitted to hospital (see above). Cites "dying twice" in the ICU as a wake up call - "I don't want to die!". Confirmed withdrawal symptoms previously reported when he tried to quit - says the first time stopped 2 weeks cold turkey (longest period sobriety since 14 y/o), second time was one week also with withdrawal symptoms. Says both times eventually the withdrawal symptoms went away but took about a week. Denies any seizures at that time, denies hx seizures. Says has been drinking daily since 14 y/o but denies any issues with work - "I never missed a day of work". Says after [employer] is when he started to acknowledge possibility of a problem "you better slow down". Denies alcohol related social problems. Cites medical problems and ICU stay as main wake-up calls.     Reports ongoing anxiety worse at night, says often affects his sleep. Wide ranging anxiety, says he can wake from sleep anxious and be unable to return to sleep. Started Remeron since discharge with planned increase to 15 mg soon. Feels it is helping with sleep "for the most part" and thinks he has some daytime benefits as well regarding anxiety. Denies mood issues. Relates to [employer], says " "when he was working he was "wore out" at home and didn't have trouble with generalized anxiety or insomnia. Denies any hx SI/HI. During aforementioned withdrawals denies any periods of disorientation or AVH, though collateral reports indicate likely hx DTs. Does acknowledge he does not remember much of ICU stay (chart review with possible AVH). Denies illicit drug use, denies hx addiction issues with illicits.    Denies recent mood issues. Reports sleep and anxiety has improved since starting Remeron. Otherwise pan negative on SIGECAPS since recent hospital discharge.     =====================    INTERVAL HX    4/15/19  Patient doing well today. Had a good weekend, worked on pool this weekend. Mood stable and good. Eating and sleeping well at home. Reports increased appetite with Remeron. Denies other side effects. Denies SI/HI/AVH. Afternoon AA meetings going well, patient attending daily. Feels he has been surprised he enjoys AA as much as he does. Good insight to benefits of daily attendance even after maintaining sobriety, discussed people at meetings that have been sober ten years. In process of finding a sponsor. Group here going well. Denies cravings. Says anxiety is improving "everything is getting better". Patient with no other questions for MD, and does not have any particular topics he would like to discuss when offered.     Toxicology Screen: Alcohol biomarkers 4/12 negative; Utox 4/12 negative; breath alcohol 4/12 negative   Medication Side Effects: None  Cravings: no  No other acute psychiatric issues reported at this time.    4/17/19  Patient doing well. Reports mood doing good and stable. Says son-in-law came in for family today, explained for privacy reasons son-in-law unable to attend group meetings with other patients, patient voiced understanding. Says main result of family day is patient would like to address his hurt knee sooner rather than later. Picked up rx, plans to trial naltrexone at home " this weekend in case he gets nauseous. AA meetings going well, group here going well. Denies SI/HI/AVH. Plans to address knee as soon as done with program. Denies cravings , does report eating more. Sleeping well at home. Reports night time anxiety continues to improve. Denies medication side effects outside of possible increased appetite. Patient with no other questions for MD, and does not have any particular topics he/she would like to discuss when offered.    Toxicology Screen: Alcohol biomarkers 4/12 negative, 4/15 in process; Utox 4/15 negative; breath alcohol 4/16 negative   Medication Side Effects: increased appetite, though likely also due to sobriety and decreased etoh calories  Cravings: no  No other acute psychiatric issues reported at this time.    4/22/19  Patient doing well today. Reports had a good weekend. Didn't trial naltrexone over the weekend, plans to soon but plans to start by taking a quarter to minimize side effects. Patient had crawfish boil over the weekend, was able to avoid drinking and denies cravings. Denies physical complaints outside of knee pain. Eating and sleeping well. Planning to move remeron dose up to avoid morning grogginess. Denies SI/HI/AVH. AA meetings going well, group here going well. Denies medication side effects otherwise. Patient with no other questions for MD, and does not have any particular topics he/she would like to discuss when offered.    Toxicology Screen: Alcohol biomarkers 4/15 negative; Utox 4/17 negative; breath alcohol 4/18 negative   Medication Side Effects: denied  Cravings: no  No other acute psychiatric issues reported at this time.    4/24/19  Patient doing well, eating well at home. Reports trouble sleeping last night. Says Remeron helps him sleep but feels he may need to take earlier in the evening to time effect, advised that is fine and he can time as he pleases in the evening. Pleasant discussion about jazz fest, knee/foot issues. Reports good  mood and night time anxiety not an issue or keeping him from sleeping, denies daytime anxiety. Denies SI/HI/AVH. Denies medication side effects. Endorses knee pain. AA going well, good insight. Group here going well. Denies cravings, patient says memories of ICU stay keep him from wanting. Patient didn't trail naltrexone over the weekend, will revisit on Friday for possible weekend trial.    Toxicology Screen: Alcohol biomarkers 4/15 negative. 4/22 pending; Utox 4/22 negative; breath alcohol 4/23 negative   Medication Side Effects: denied  Cravings: no  No other acute psychiatric issues reported at this time.    4/26/19  Patient doing well today. Calm, conversational, pleasant. Arrangements for knee surgery after this program ongoing. Sleep continues to improve. Remeron has been helping his sleep and he has been able to take the pill at a time better for him (earlier). Discussed SSRI, patient. Denies SI/HI/AVH. AA meetings going well, group here going well. Denies mood issues or major fluctuations. Denies medication side effects. Knee pain but no other physical complaints. Reports anxiety much improved since admit, denies any night time anxiety that affects sleep. Denies cravings. Patient with no other questions for MD, and does not have any particular topics he/she would like to discuss when offered.    Toxicology Screen: Alcohol biomarkers 4/22 negative; Utox 4/24 negative; breath alcohol 4/25 negative   Medication Side Effects: denied  Cravings: no  No other acute psychiatric issues reported at this time.    4/29/19  Patient doing well today. Calm, conversational, pleasant. Patient reports he has been eating more sweets, had BG at home in 400s. Says last was ~200 when measured, will measure at home. Hadn't checked in a couple weeks, has discussed this with his PCP (Dr. Larry) in past and has an appointment with him in the next two weeks. Denies any physical symptoms symptoms during this. Endorses good and stable  "mood. Denies SI/HI/AVH. Remeron continues to be helpful, taking it earlier to avoid daytime grogginess. Concern for appetite effect as above, will continue to monitor. Reports anxiety continues to be improved, with little during daytime or night (vs staying up with anxiety previously). No alcohol cravings, but reports cravings for sweets. AA meetings going well, group here going well.     Toxicology Screen: Alcohol biomarkers 4/22 negative 4/29 in process; Utox 4/26 negative 4/29 in process; breath alcohol 4/29 negative   Medication Side Effects: denied  Cravings: no  No other acute psychiatric issues reported at this time.    05/01/19  Medications adjusted by Dr. Elaine yesterday as pt had reported weight gain. Remeron was discontinued and pt was started on trazodone 50 mg for sleep qhs. Pt is calm, cooperative, pleasant and conversational. He states that he is "feeling good" this morning and reports his mood has been good. Pt discusses his blood sugars being high and how he needs to follow up with his PCP; also discussed how he was worried about the increased dose of remeron was impacting his appetite and causing his sugars to rise. He states that Dr. Elaine changed his medication to trazodone and feels that it is working well. He reports sleeping aell and denies any grogginess, and expresses that he. He states that his anxiety is doing well and has no complaints. Denies grogginess, feels that trazodone has been helpful.  Denies alcohol cravings, AA meetings going well, groups going well. Denies SI/HI.     Toxicology Screen: Alcohol biomarkers 4/22 negative 4/29 in process; Utox 4/26 negative 4/29 neg; breath alcohol 4/29 negative, utox 501 neg and breath alcohol neg   Medication Side Effects: denied  Cravings: no  No other acute psychiatric issues reported at this time     05/03/2017  Pt reports that he has been doing well, an describes his mood as "good". Blood sugars still high but coming down. Discusses " "difficulty fixing house. Sleeping well, states he has been sleeping just as good as the remeron. Appetite is "good" .Pt states that his anxiety is "almost gone". Attending AA meetings and says it's going well, says groups here are going well. Denies cravings and states that whenever he sees a beer he thinks about being in the ICU and has no desire to be back in this position. Discusses weekend plans of working on his house. Denies SI/HI    Toxicology Screen: Alcohol biomarkers 4/22 negative 4/29 in process; Utox 4/26 negative 4/29 neg; breath alcohol 4/29 negative   Medication Side Effects: denied  Cravings: denied  No other acute psychiatric issues reported at this time     5/06/2017  Pt reports doing well, and states his mood is "good". Discussed his weekend, spent time with his family and doing home repairs. Denies alcohol cravings, denies relapses. Attending AA meetings over the weekend which have been going well. Groups in the ABU going well without issues. . Discussed decreasing blood sugars which pt is happy about and he is looking forward to telling his PCP about his progress at his appointment Friday.Patient education provided-discussed how addiction is a chronic disease. States the trazodone continues to work well for sleep and denies SE. Denies SI/HI/anxiety.  No cravings, relapses. AA meetings going well.    Toxicology Screen: Alcohol biomarkers 4/22 negative 4/29 in process; breath alcohol negative and UDS negative   Medication Side Effects: denied  Cravings: denied  No other acute psychiatric issues reported at this time     5/08/2019  Patient with medication readjustments since last spoke to him; pt was advised to only take trazodone 25-50 mg  on a PRN basis for sleep as there was some concern it was causing him to be too groggy in the mornings and BP low. Pt states that he took "a little less than half a pill" last night so he could sleep. He states that he was afraid of "quitting cold turkey". Pt " "states that his BP was low this AM and that when he walked from the parking garage to the clinic he felt "wore out". He denies light headedness of dizziness. Discussed that there are unlikely to be negative sx of discontinuing trazodone suddenly att that dose and re-emphasized utilizing melatonin for sleep as needed as it is unlikely to effect his BP. Pt discusses how daughter was worried that he was drinking again while he was meeting with Dr. Elaine because of seemed "tired". Pt adamantly denies drinking or havign cravings. He states that  meetings have been going well and has been attending meetings closer to his home.    Denies cravings, relapses. Groups in [facility] going "pretty good".     Toxicology Screen: Alcohol biomarkers 5/06 neg, 5/06 and 5/07 breath alcohol negative and UDS negative   Medication Side Effects: denied  Cravings: denied  No other acute psychiatric issues reported at this time     5/10/2019  Pt states his mood is "good", states he was late this morning because he had an appointment w/ Dr. Larry; he states that his appointment went well this morning and plans on seeing him again prior to his knee operation. Pt states that he stopped the trazodone and feels much better in terms of energy and sleep. Pt states that he took melatonin and feels that is "all I need". Pt says that he is looking forward to surgery for his knee and getting back to doing things he enjoys.Reflected on his drinking and the consequences. Denies cravings, denies relapses. Going to AA    Toxicology Screen: Alcohol biomarkers 5/06 neg, 5/06 and 5/07 breath alcohol negative and UDS negative   Medication Side Effects: denied  Cravings: denied  No other acute psychiatric issues reported at this time         Allergies:  No known drug allergies  Medical/Surgical History:  Past Medical History:   Diagnosis Date    A-fib     Alcohol abuse     Arthritis     Chronic gout     Diabetes mellitus     DM (diabetes mellitus) " 11/16/2016    Fatty liver     Hyperlipidemia     Renal cell cancer 2014     Past Surgical History:   Procedure Laterality Date    ABSCESS DRAINAGE      perirectal    COLONOSCOPY      FISTULOTOMY N/A 5/19/2015    Performed by Shane Hughes MD at [facility] OR [address] [place] [address]    HAND SURGERY Left     HEMORRHOIDECTOMY N/A 5/19/2015    Performed by Shane Hughes MD at [facility] OR [address] [place] [address]    KIDNEY SURGERY      partial left kidney removal - [place]    PARTIAL NEPHRECTOMY Left August 2014    ROBOTIC ASSISTED LAPAROSCOPIC NEPHRECTOMY PARTIAL Left 8/28/2014    Performed by Fabian Estevez MD at [facility] OR [address] [place] [address]     OBJECTIVE:     Current Medications:   Infusions:    Scheduled:    PRN:    Home Medications:  Prior to Admission medications    Medication Sig Start Date End Date Taking? Authorizing Provider   allopurinol (ZYLOPRIM) 100 MG tablet TAKE 2 TABLETS BY MOUTH DAILY 2/22/19   Diana Hemphill MD   apixaban (ELIQUIS) 5 mg Tab Take 1 tablet (5 mg total) by mouth 2 (two) times daily. 3/4/19   Uriel Tolentino MD   aspirin 81 MG Chew Take 1 tablet (81 mg total) by mouth once daily. 3/4/19 3/3/20  Uriel Tolentino MD   cyanocobalamin (VITAMIN B-12) 1000 MCG tablet Take 1 tablet (1,000 mcg total) by mouth once daily. 3/30/19   DEAN Vigil MD   fenofibrate 160 MG Tab Take 1 tablet (160 mg total) by mouth every evening. 10/29/18   Bacilio Larry MD   flecainide (TAMBOCOR) 150 MG Tab Take 1 tablet (150 mg total) by mouth 2 (two) times daily. 3/7/19 4/9/19  Uriel Tolentino MD   folic acid (FOLVITE) 1 MG tablet Take 1 tablet (1 mg total) by mouth once daily. 3/30/19 3/29/20  DEAN Vigil MD   metoprolol tartrate (LOPRESSOR) 25 MG tablet NOT CURRENTLY TAKING Take 0.5 tablets (12.5 mg total) by mouth 2 (two) times daily. 3/29/19 3/28/20  DEAN Vigil MD   mirtazapine (REMERON) 15 MG tablet Take 1 tablet (15 mg total) by mouth every evening. 4/9/19 4/8/20  Bacilio Larry MD   multivitamin (THERAGRAN)  "tablet Take 1 tablet by mouth once daily. 3/30/19   W. Daljit Vigil MD   omega-3 acid ethyl esters (LOVAZA) 1 gram capsule Take 2 g by mouth 2 (two) times daily.    Historical Provider, MD   pantoprazole (PROTONIX) 40 MG tablet Take 1 tablet (40 mg total) by mouth once daily. 3/30/19 3/29/20  W. Daljit Vigil MD   ranitidine (ZANTAC) 150 MG capsule Take 150 mg by mouth 2 (two) times daily.    Historical Provider, MD   rosuvastatin (CRESTOR) 10 MG tablet Take 1 tablet (10 mg total) by mouth once daily. 3/4/19 3/3/20  Uriel Tolentino MD   thiamine 100 MG tablet Take 1 tablet (100 mg total) by mouth once daily. 3/30/19   W. Daljit Vigil MD     NOT CURRENTLY TAKING METOPROLOL    Vital Signs:  Vitals:    05/10/19 1057   BP: 110/78   Pulse: 72   Resp: 16         Mental Status Exam:  Appearance: unremarkable, age appropriate, casually dressed, good grooming  Level of Consciousness: alert  Behavior/Cooperation: friendly and cooperative  Psychomotor: unremarkable   Speech: normal tone, normal rate, normal pitch, normal volume, accented  Language: english, fluid  Orientation: grossly oriented  Attention Span/Concentration: intact  Memory: grossly intact  Mood: "good"  Affect: normal  Thought Process: linear, normal and logical  Associations: normal and logical  Thought Content: normal, no suicidality, no homicidality, delusions, or paranoia volunteered. Denies SI/HI when asked directly  Fund of Knowledge: Aware of current events   Abstraction: intact to conversation  Insight: good  Judgment: good    Labs/Imaging/Studies:   Recent Results (from the past 24 hour(s))   POCT BREATH ALCOHOL TEST    Collection Time: 05/09/19 11:07 AM   Result Value Ref Range    Breath Alcohol 0.000    POCT BREATH ALCOHOL TEST    Collection Time: 05/10/19 10:58 AM   Result Value Ref Range    Breath Alcohol 0.000       ASSESSMENT     Donald Warren Abadie is a 63 y.o. male with a past psychiatric history of anxiety, who presented to [facility] due " to alcoho abuse. 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 Patient and family describe worsening alcohol intake leading to significant social isolation. Patient with history legal complications (DWI) from alcohol use. Patient very recently admitted for an extensive ICU stay after accidentally overdosing on HTN medications while intoxicated from alcohol use. At presentation to the ER patient was noted to have multiple alcohol withdrawal symptoms as well reports from family consistent with Delirium Tremens. Patient is also high risk for mood disorder if he were to relapse, with multiple static factors (, older white male with medical comorbidities and hx substance abuse) that would contribute to risk of completed suicide. Pt would benefit from continued IOP as medication adjustments are ongoing and pt will need to have surgery in the near future, he would benefit from ongoing structure of the program to prevent relapse as he is unable to participate in usual activities 2/2 knee pain and he remains at high risk for relapse until he gets surgery.     IMPRESSION  - alcohol use disorder, severe  - anxiety, unspecified, resolved    PLAN     · continue patient on ABU protocol.  · Breathalyzer daily and urine toxicology at least three times per week.  · VS daily x 3 days.  · CBC and CMP unremarkable, LFTs WNL  · Patient counseled on abstinence from alcohol.    Medications: Continue current medications.  · Recommend melatonin nightly qhs for sleep.   ·  Pt reports low BP and feeling tired with taking qhs trazodone. Recommend utilizing Trazodone 25-50 mg PO nightly for insomnia only if melatonin is ineffective.  · Discussed possibility of SSRI or other antidepressant for anxiety with patient at admit. He is open to the possibility but patient and MD agreed to revisit this option as he progresses in program - at admit had recent  increase in  Remeron dosage (7.5 to 15 nightly) and patient feels he is sleeping well with anxiety lessened and restricted to night time only.  Revisited SSRI 4/26/19, patient but felt well controlled. Pt changed to trazodone 4/30 2/2 increased appetite.  Patient amenable to continue trazodone and denies any ongoing anxiety  · Continue naltrexone 50 mg (Rx'd 4/16)  · Medications for alcohol use disorder were discussed including acamprosate, naltrexone and disulfiram.     Status: Continue treatment on ABU    Taylor Tom MD   [employer] Psych[facility] PGY2

## 2019-05-10 NOTE — PLAN OF CARE
05/10/19 1400   Activity/Group Therapy Checklist   Group Goals/Reflection  (prompts )   Attendance Attended   Follows Direction Followed directions   Group Interactions/Observations Interacted appropriately   Affect/Mood Range Normal range   Affect/Mood Display Appropriate   Goal Progression Progressing

## 2019-05-11 NOTE — PROGRESS NOTES
CHIEF COMPLAINT:  One-month followup.    HISTORY OF PRESENT ILLNESS:  The patient is a 63-year-old gentleman who we see   for hypertension, AFib, elevated triglycerides, and diabetes, who comes in today   as a followup for alcohol abuse.  The patient is currently in therapy.  He is   going to AA meetings.  He reports no alcohol use.  The patient's only complaint   that his blood pressure was dropping too much with his metoprolol, so he stopped   taking it.  He is taking his flecainide twice a day.  Overall, he is feeling   better.    REVIEW OF SYSTEMS:  The patient reports no chest pain, no shortness of breath.    No palpitations.  His last A1c was 6.5 on April 9th.  He is going to AA as well   as attending group therapy.  The patient does complain of knee pain.  He is   planning on seeing Orthopedics and having knee replacement.    PHYSICAL EXAMINATION:  GENERAL APPEARANCE:  No acute distress.  HEENT:  Trachea is midline without JVD.  PULMONARY:  Good inspiratory and expiratory breath sounds are heard.  Lungs are   clear to auscultation.  CARDIOVASCULAR:  S1 and S2.  EXTREMITIES:  Without edema.  ABDOMEN:  Nontender, nondistended, without hepatosplenomegaly.    COMMENTS:  Approximately fifteen minutes were spent in discussion with the   patient about alcoholism.  The patient states at this point in time, he has no   intention of ever going back to alcohol after what he had been through.  We did   discuss that further out he gets the more that temptation will be there.  His   daughter is still staying with him.    ASSESSMENT:  1.  History of alcohol abuse, currently alcohol free.  2.  AFib, currently on flecainide.  3.  History of renal cell cancer.  4.  Diabetes.    PLAN:  No change in medications at this time.  The patient will follow up with   us in approximately six weeks.      JDS/HN  dd: 05/11/2019 06:45:55 (CDT)  td: 05/11/2019 23:58:59 (CDT)  Doc ID   #[id number]  Job ID #[id number]    CC:

## 2019-05-13 ENCOUNTER — HOSPITAL ENCOUNTER (OUTPATIENT)
Dept: PSYCHIATRY | Facility: HOSPITAL | Age: 64
Discharge: HOME | End: 2019-05-13
Attending: PSYCHIATRY & NEUROLOGY
Payer: COMMERCIAL

## 2019-05-13 VITALS — DIASTOLIC BLOOD PRESSURE: 70 MMHG | HEART RATE: 70 BPM | RESPIRATION RATE: 16 BRPM | SYSTOLIC BLOOD PRESSURE: 118 MMHG

## 2019-05-13 DIAGNOSIS — F10.20 ALCOHOL USE DISORDER, SEVERE, DEPENDENCE: Primary | ICD-10-CM

## 2019-05-13 DIAGNOSIS — F10.20 ALCOHOL USE DISORDER, SEVERE, IN CONTROLLED ENVIRONMENT: ICD-10-CM

## 2019-05-13 DIAGNOSIS — F10.931 ALCOHOL WITHDRAWAL SYNDROME, WITH DELIRIUM: ICD-10-CM

## 2019-05-13 LAB
AMPHET+METHAMPHET UR QL: NEGATIVE
BARBITURATES UR QL SCN>200 NG/ML: NEGATIVE
BENZODIAZ UR QL SCN>200 NG/ML: NEGATIVE
BREATH ALCOHOL: 0
BZE UR QL SCN: NEGATIVE
CANNABINOIDS UR QL SCN: NEGATIVE
CREAT UR-MCNC: 38 MG/DL (ref 23–375)
ETHANOL UR-MCNC: <10 MG/DL
METHADONE UR QL SCN>300 NG/ML: NEGATIVE
OPIATES UR QL SCN: NEGATIVE
PCP UR QL SCN>25 NG/ML: NEGATIVE
TOXICOLOGY INFORMATION: NORMAL

## 2019-05-13 PROCEDURE — 90853 GROUP PSYCHOTHERAPY: CPT | Performed by: SOCIAL WORKER

## 2019-05-13 PROCEDURE — 90853 PR GROUP PSYCHOTHERAPY: ICD-10-PCS | Mod: 59,,, | Performed by: PSYCHOLOGIST

## 2019-05-13 PROCEDURE — 90853 GROUP PSYCHOTHERAPY: CPT

## 2019-05-13 PROCEDURE — 90853 GROUP PSYCHOTHERAPY: CPT | Mod: 59,,, | Performed by: PSYCHOLOGIST

## 2019-05-13 PROCEDURE — 80307 DRUG TEST PRSMV CHEM ANLYZR: CPT

## 2019-05-13 NOTE — PLAN OF CARE
05/13/19 1400   Activity/Group Therapy Checklist   Group Educational  (Gratitude)   Attendance Attended   Follows Direction Followed directions   Group Interactions/Observations Interacted appropriately;Alert;Supportive;Sharing   Affect/Mood Range Normal range   Affect/Mood Display Appropriate   Goal Progression Progressing

## 2019-05-13 NOTE — PROGRESS NOTES
Group Psychotherapy (PhD/LCSW)    Site: [facility]    Clinical status of patient: Intensive Outpatient Program (IOP)    Date: 5/13/2019    Group Focus: Psychodynamic Group Psychotherapy    Length of service: 53217 - 45-50 minutes    Number of patients in attendance: 5    Referred by: Addictive Behavior Unit Treatment Team    Target symptoms: Alcohol Abuse    Patient's response to treatment: Active Listening and Self-disclosure    Progress toward goals: Progressing adequately    Interval History: Pt shared his story with a new member of the group.     Diagnosis: alcohol use disorder, severe, dependence    Plan: Continue treatment on ABU

## 2019-05-13 NOTE — PROGRESS NOTES
Group Psychotherapy (PhD/LCSW)    Site: [facility]    Clinical status of patient: Intensive Outpatient Program (IOP)    Date: 5/13/2019    Group Focus:  Communication Skills     Length of service: 89632 - 45-50 minutes    Number of patients in attendance: 10    Referred by: Addictive Behavior Unit Treatment Team    Target symptoms: Alcohol Abuse    Patient's response to treatment: Active Listening      Progress toward goals: Progressing adequately    Interval History:  Discussed basic communication skills: I-messages; Reflective/Active listening; Contextual/Approach factors). Provided examples and role play to illustrate the use and value of these skills.     Diagnosis: alcohol use disorder, severe, dependence    Plan: Continue treatment on ABU

## 2019-05-13 NOTE — PROGRESS NOTES
"5/13/2019 8:43 AM  Donald Warren Abadie  1955  [id number]    [facility] Intensive Outpatient Program Progress Note    STATUS:  Intensive Outpatient Program (IOP)    SUBJECTIVE:   Chief Complaint: substance abuse    History of Present Illness:   Donald Warren Abadie is a 63 y.o. male with a past psychiatric history of anxiety, unspecified , who presented to [facility]BU IOP due to alcohol abuse.     Patient was recently seen by Psychiatry CL service after a recent hospital stay, per their note 3/27/19:  "As per the patient and the daughter, the patient has been drinking since he was 14 years old. Pt began drinking heavily after retiring five years ago and as per the daughter, it has worsened over the past year. During the past two months he has left the house only to get more alcohol and has not been participating in his normal activities. The patient says that he was drinking about 18 drinks per day. Attempted sobriety for the first time 8 weeks ago and experienced tremors and withdrawal symptoms (denies DTs) and resumed drinking. The patient states that he tried quitting alcohol 3.5 weeks ago because he was "feeling bad" and also accidentally overdosed on his BB and flecainide to feel better, which resulted in his [facility] admission. He endorsed sx of EtOH withdrawal on initial presentation, including anxiety, diaphoresis, hallucinations, and hallucinations.      The daughter believes that he is drinking more because he has nothing to occupy since retiring. She also thinks that his anxiety and depression have gotten worse and he uses drinking as a coping mechanism. The patient has had anxiety all his life, but it has worsened over the past few years. Anxiety is worse at night and reports poor sleep as a result. His depression has also worsened. He has tried Ativan in the past but was taken off of it because, as daughter states, he was addicted to it. He has also tried Effexor, Lexapro and Cymbalta. The daughter " "states that he was never on them long enough for them to work. She saw a difference with Cymbalta but thinks he stopped taking it because of the side effects. "    =================================    - Patient confirmed psych hx in above report. Was admitted to the hospital with extensive ICU stay from 3/15/19-3/29/19, was admitted following an accidental overdose of HTN medications, per family alcohol involved with the incident and patient showed signs of alcohol withdrawal when admitted.     Pleasant, linear throughout interview. Says only was drinking beer, confirms 18 beers per dayno liquor or wine. Says once he had no work obligations he started drinking all day (vs drinks after work). Drank 12 oz bud lights. Last drink was 3/15/19 after which patient was admitted to hospital (see above). Cites "dying twice" in the ICU as a wake up call - "I don't want to die!". Confirmed withdrawal symptoms previously reported when he tried to quit - says the first time stopped 2 weeks cold turkey (longest period sobriety since 14 y/o), second time was one week also with withdrawal symptoms. Says both times eventually the withdrawal symptoms went away but took about a week. Denies any seizures at that time, denies hx seizures. Says has been drinking daily since 14 y/o but denies any issues with work - "I never missed a day of work". 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 Denies alcohol related social problems. Cites medical problems and ICU stay as main wake-up calls.     Reports ongoing anxiety worse at night, says often affects his sleep. Wide ranging anxiety, says he can wake from sleep anxious and be unable to return to sleep. Started Remeron since discharge with planned increase to 15 mg soon. Feels it is helping with sleep "for the most part" and thinks he has some daytime benefits as well regarding anxiety. Denies mood issues. Relates to penitentiary, says " "when he was working he was "wore out" at home and didn't have trouble with generalized anxiety or insomnia. Denies any hx SI/HI. During aforementioned withdrawals denies any periods of disorientation or AVH, though collateral reports indicate likely hx DTs. Does acknowledge he does not remember much of ICU stay (chart review with possible AVH). Denies illicit drug use, denies hx addiction issues with illicits.    Denies recent mood issues. Reports sleep and anxiety has improved since starting Remeron. Otherwise pan negative on SIGECAPS since recent hospital discharge.     =====================    INTERVAL HX    4/15/19  Patient doing well today. Had a good weekend, worked on pool this weekend. Mood stable and good. Eating and sleeping well at home. Reports increased appetite with Remeron. Denies other side effects. Denies SI/HI/AVH. Afternoon AA meetings going well, patient attending daily. Feels he has been surprised he enjoys AA as much as he does. Good insight to benefits of daily attendance even after maintaining sobriety, discussed people at meetings that have been sober ten years. In process of finding a sponsor. Group here going well. Denies cravings. Says anxiety is improving "everything is getting better". Patient with no other questions for MD, and does not have any particular topics he would like to discuss when offered.     Toxicology Screen: Alcohol biomarkers 4/12 negative; Utox 4/12 negative; breath alcohol 4/12 negative   Medication Side Effects: None  Cravings: no  No other acute psychiatric issues reported at this time.    4/17/19  Patient doing well. Reports mood doing good and stable. Says son-in-law came in for family today, explained for privacy reasons son-in-law unable to attend group meetings with other patients, patient voiced understanding. Says main result of family day is patient would like to address his hurt knee sooner rather than later. Picked up rx, plans to trial naltrexone at home " this weekend in case he gets nauseous. AA meetings going well, group here going well. Denies SI/HI/AVH. Plans to address knee as soon as done with program. Denies cravings , does report eating more. Sleeping well at home. Reports night time anxiety continues to improve. Denies medication side effects outside of possible increased appetite. Patient with no other questions for MD, and does not have any particular topics he/she would like to discuss when offered.    Toxicology Screen: Alcohol biomarkers 4/12 negative, 4/15 in process; Utox 4/15 negative; breath alcohol 4/16 negative   Medication Side Effects: increased appetite, though likely also due to sobriety and decreased etoh calories  Cravings: no  No other acute psychiatric issues reported at this time.    4/22/19  Patient doing well today. Reports had a good weekend. Didn't trial naltrexone over the weekend, plans to soon but plans to start by taking a quarter to minimize side effects. Patient had crawfish boil over the weekend, was able to avoid drinking and denies cravings. Denies physical complaints outside of knee pain. Eating and sleeping well. Planning to move remeron dose up to avoid morning grogginess. Denies SI/HI/AVH. AA meetings going well, group here going well. Denies medication side effects otherwise. Patient with no other questions for MD, and does not have any particular topics he/she would like to discuss when offered.    Toxicology Screen: Alcohol biomarkers 4/15 negative; Utox 4/17 negative; breath alcohol 4/18 negative   Medication Side Effects: denied  Cravings: no  No other acute psychiatric issues reported at this time.    4/24/19  Patient doing well, eating well at home. Reports trouble sleeping last night. Says Remeron helps him sleep but feels he may need to take earlier in the evening to time effect, advised that is fine and he can time as he pleases in the evening. Pleasant discussion about jazz fest, knee/foot issues. Reports good  mood and night time anxiety not an issue or keeping him from sleeping, denies daytime anxiety. Denies SI/HI/AVH. Denies medication side effects. Endorses knee pain. AA going well, good insight. Group here going well. Denies cravings, patient says memories of ICU stay keep him from wanting. Patient didn't trail naltrexone over the weekend, will revisit on Friday for possible weekend trial.    Toxicology Screen: Alcohol biomarkers 4/15 negative. 4/22 pending; Utox 4/22 negative; breath alcohol 4/23 negative   Medication Side Effects: denied  Cravings: no  No other acute psychiatric issues reported at this time.    4/26/19  Patient doing well today. Calm, conversational, pleasant. Arrangements for knee surgery after this program ongoing. Sleep continues to improve. Remeron has been helping his sleep and he has been able to take the pill at a time better for him (earlier). Discussed SSRI, patient. Denies SI/HI/AVH. AA meetings going well, group here going well. Denies mood issues or major fluctuations. Denies medication side effects. Knee pain but no other physical complaints. Reports anxiety much improved since admit, denies any night time anxiety that affects sleep. Denies cravings. Patient with no other questions for MD, and does not have any particular topics he/she would like to discuss when offered.    Toxicology Screen: Alcohol biomarkers 4/22 negative; Utox 4/24 negative; breath alcohol 4/25 negative   Medication Side Effects: denied  Cravings: no  No other acute psychiatric issues reported at this time.    4/29/19  Patient doing well today. Calm, conversational, pleasant. Patient reports he has been eating more sweets, had BG at home in 400s. Says last was ~200 when measured, will measure at home. Hadn't checked in a couple weeks, has discussed this with his PCP (Dr. Larry) in past and has an appointment with him in the next two weeks. Denies any physical symptoms symptoms during this. Endorses good and stable  "mood. Denies SI/HI/AVH. Remeron continues to be helpful, taking it earlier to avoid daytime grogginess. Concern for appetite effect as above, will continue to monitor. Reports anxiety continues to be improved, with little during daytime or night (vs staying up with anxiety previously). No alcohol cravings, but reports cravings for sweets. AA meetings going well, group here going well.     Toxicology Screen: Alcohol biomarkers 4/22 negative 4/29 in process; Utox 4/26 negative 4/29 in process; breath alcohol 4/29 negative   Medication Side Effects: denied  Cravings: no  No other acute psychiatric issues reported at this time.    05/01/19  Medications adjusted by Dr. Elaine yesterday as pt had reported weight gain. Remeron was discontinued and pt was started on trazodone 50 mg for sleep qhs. Pt is calm, cooperative, pleasant and conversational. He states that he is "feeling good" this morning and reports his mood has been good. Pt discusses his blood sugars being high and how he needs to follow up with his PCP; also discussed how he was worried about the increased dose of remeron was impacting his appetite and causing his sugars to rise. He states that Dr. Elaine changed his medication to trazodone and feels that it is working well. He reports sleeping aell and denies any grogginess, and expresses that he. He states that his anxiety is doing well and has no complaints. Denies grogginess, feels that trazodone has been helpful.  Denies alcohol cravings, AA meetings going well, groups going well. Denies SI/HI.     Toxicology Screen: Alcohol biomarkers 4/22 negative 4/29 in process; Utox 4/26 negative 4/29 neg; breath alcohol 4/29 negative, utox 501 neg and breath alcohol neg   Medication Side Effects: denied  Cravings: no  No other acute psychiatric issues reported at this time     05/03/2017  Pt reports that he has been doing well, an describes his mood as "good". Blood sugars still high but coming down. Discusses " "difficulty fixing house. Sleeping well, states he has been sleeping just as good as the remeron. Appetite is "good" .Pt states that his anxiety is "almost gone". Attending AA meetings and says it's going well, says groups here are going well. Denies cravings and states that whenever he sees a beer he thinks about being in the ICU and has no desire to be back in this position. Discusses weekend plans of working on his house. Denies SI/HI    Toxicology Screen: Alcohol biomarkers 4/22 negative 4/29 in process; Utox 4/26 negative 4/29 neg; breath alcohol 4/29 negative   Medication Side Effects: denied  Cravings: denied  No other acute psychiatric issues reported at this time     5/06/2017  Pt reports doing well, and states his mood is "good". Discussed his weekend, spent time with his family and doing home repairs. Denies alcohol cravings, denies relapses. Attending AA meetings over the weekend which have been going well. Groups in the ABU going well without issues. . Discussed decreasing blood sugars which pt is happy about and he is looking forward to telling his PCP about his progress at his appointment Friday.Patient education provided-discussed how addiction is a chronic disease. States the trazodone continues to work well for sleep and denies SE. Denies SI/HI/anxiety.  No cravings, relapses. AA meetings going well.    Toxicology Screen: Alcohol biomarkers 4/22 negative 4/29 in process; breath alcohol negative and UDS negative   Medication Side Effects: denied  Cravings: denied  No other acute psychiatric issues reported at this time     5/08/2019  Patient with medication readjustments since last spoke to him; pt was advised to only take trazodone 25-50 mg  on a PRN basis for sleep as there was some concern it was causing him to be too groggy in the mornings and BP low. Pt states that he took "a little less than half a pill" last night so he could sleep. He states that he was afraid of "quitting cold turkey". Pt " "states that his BP was low this AM and that when he walked from the parking garage to the clinic he felt "wore out". He denies light headedness of dizziness. Discussed that there are unlikely to be negative sx of discontinuing trazodone suddenly att that dose and re-emphasized utilizing melatonin for sleep as needed as it is unlikely to effect his BP. Pt discusses how daughter was worried that he was drinking again while he was meeting with Dr. Elaine because of seemed "tired". Pt adamantly denies drinking or havign cravings. He states that  meetings have been going well and has been attending meetings closer to his home.    Denies cravings, relapses. Groups in [facility] going "pretty good".     Toxicology Screen: Alcohol biomarkers 5/06 neg, 5/06 and 5/07 breath alcohol negative and UDS negative   Medication Side Effects: denied  Cravings: denied  No other acute psychiatric issues reported at this time     5/10/2019  Pt states his mood is "good", states he was late this morning because he had an appointment w/ Dr. Larry; he states that his appointment went well this morning and plans on seeing him again prior to his knee operation. Pt states that he stopped the trazodone and feels much better in terms of energy and sleep. Pt states that he took melatonin and feels that is "all I need". Pt says that he is looking forward to surgery for his knee and getting back to doing things he enjoys.Reflected on his drinking and the consequences. Denies cravings, denies relapses. Going to AA    Toxicology Screen: Alcohol biomarkers 5/06 neg, 5/06 and 5/07 breath alcohol negative and UDS negative   Medication Side Effects: denied  Cravings: denied  No other acute psychiatric issues reported at this time     5/13/2019  Pt states that his  Mood is "good" today. Pt states that he had a good weekend, had a bbq this weekend. Pt states he "quit taking most of my medicine". Pt states that he has discontinued trazodone and took melatonin " "helpful and slept well. Pt states that stopped taking naltrexone and denies cravings. Pt states that he will see a knee doctor in June. Going to AA meetings which have been going "ok". Pt states that he has "8 days left" in the program. Groups in the [facility] have been going "good". Denies SI/HI    Toxicology Screen: Alcohol biomarkers 5/06 neg, 5/06 and 5/07 breath alcohol negative and UDS negative   Medication Side Effects: denied  Cravings: denied  No other acute psychiatric issues reported at this time         Allergies:  No known drug allergies  Medical/Surgical History:  Past Medical History:   Diagnosis Date    A-fib     Alcohol abuse     Arthritis     Chronic gout     Diabetes mellitus     DM (diabetes mellitus) 11/16/2016    Fatty liver     Hyperlipidemia     Renal cell cancer 2014     Past Surgical History:   Procedure Laterality Date    ABSCESS DRAINAGE      perirectal    COLONOSCOPY      FISTULOTOMY N/A 5/19/2015    Performed [facility] at [facility] OR [address] [place] [address]    HAND SURGERY Left     HEMORRHOIDECTOMY N/A 5/19/2015    [facility]    KIDNEY SURGERY      partial left kidney removal - [place]    PARTIAL NEPHRECTOMY Left August 2014    ROBOTIC ASSISTED LAPAROSCOPIC NEPHRECTOMY PARTIAL Left 8/28/2014    Performed by Fabian Estevez MD at [facility] OR [address] [place] [address]     OBJECTIVE:     Current Medications:   Infusions:    Scheduled:    PRN:    Home Medications:  Prior to Admission medications    Medication Sig Start Date End Date Taking? Authorizing Provider   allopurinol (ZYLOPRIM) 100 MG tablet TAKE 2 TABLETS BY MOUTH DAILY 2/22/19   Diana Hemphill MD   apixaban (ELIQUIS) 5 mg Tab Take 1 tablet (5 mg total) by mouth 2 (two) times daily. 3/4/19   Uriel Tolentino MD   aspirin 81 MG Chew Take 1 tablet (81 mg total) by mouth once daily. 3/4/19 3/3/20  Uriel Tolentino MD   cyanocobalamin (VITAMIN B-12) 1000 MCG tablet Take 1 tablet (1,000 mcg " "total) by mouth once daily. 3/30/19   DEAN Vigil MD   fenofibrate 160 MG Tab Take 1 tablet (160 mg total) by mouth every evening. 10/29/18   Bacilio Larry MD   flecainide (TAMBOCOR) 150 MG Tab Take 1 tablet (150 mg total) by mouth 2 (two) times daily. 3/7/19 4/9/19  Uriel Tolentino MD   folic acid (FOLVITE) 1 MG tablet Take 1 tablet (1 mg total) by mouth once daily. 3/30/19 3/29/20  DEAN Vigil MD   metoprolol tartrate (LOPRESSOR) 25 MG tablet NOT CURRENTLY TAKING Take 0.5 tablets (12.5 mg total) by mouth 2 (two) times daily. 3/29/19 3/28/20  DEAN Vigil MD   mirtazapine (REMERON) 15 MG tablet Take 1 tablet (15 mg total) by mouth every evening. 4/9/19 4/8/20  Bacilio Larry MD   multivitamin (THERAGRAN) tablet Take 1 tablet by mouth once daily. 3/30/19   DEAN Vigil MD   omega-3 acid ethyl esters (LOVAZA) 1 gram capsule Take 2 g by mouth 2 (two) times daily.    Historical Provider, MD   pantoprazole (PROTONIX) 40 MG tablet Take 1 tablet (40 mg total) by mouth once daily. 3/30/19 3/29/20  DEAN Vigil MD   ranitidine (ZANTAC) 150 MG capsule Take 150 mg by mouth 2 (two) times daily.    Historical Provider, MD   rosuvastatin (CRESTOR) 10 MG tablet Take 1 tablet (10 mg total) by mouth once daily. 3/4/19 3/3/20  Uriel Tolentino MD   thiamine 100 MG tablet Take 1 tablet (100 mg total) by mouth once daily. 3/30/19   W. Daljit Vigil MD     NOT CURRENTLY TAKING METOPROLOL    Vital Signs:  There were no vitals filed for this visit.      Mental Status Exam:  Appearance: unremarkable, age appropriate, casually dressed, good grooming  Level of Consciousness: alert  Behavior/Cooperation: friendly and cooperative  Psychomotor: unremarkable   Speech: normal tone, normal rate, normal pitch, normal volume, accented  Language: english, fluid  Orientation: grossly oriented  Attention Span/Concentration: intact  Memory: grossly intact  Mood: "good"  Affect: normal  Thought Process: linear, normal and " logical  Associations: normal and logical  Thought Content: normal, no suicidality, no homicidality, delusions, or paranoia volunteered. Denies SI/HI when asked directly  Fund of Knowledge: Aware of current events   Abstraction: intact to conversation  Insight: good  Judgment: good    Labs/Imaging/Studies:   No results found for this or any previous visit (from the past 24 hour(s)).   ASSESSMENT     Donald Warren Abadie is a 63 y.o. male with a past psychiatric history of anxiety, who presented to ABU IOP due to alcoho abuse. 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 Patient and family describe worsening alcohol intake leading to significant social isolation. Patient with history legal complications (DWI) from alcohol use. Patient very recently admitted for an extensive ICU stay after accidentally overdosing on HTN medications while intoxicated from alcohol use. At presentation to the ER patient was noted to have multiple alcohol withdrawal symptoms as well reports from family consistent with Delirium Tremens. Patient is also high risk for mood disorder if he were to relapse, with multiple static factors (, older white male with medical comorbidities and hx substance abuse) that would contribute to risk of completed suicide. Pt would benefit from continued IOP as medication adjustments are ongoing and pt will need to have surgery in the near future, he would benefit from ongoing structure of the program to prevent relapse as he is unable to participate in usual activities 2/2 knee pain and he remains at high risk for relapse until he gets surgery.     IMPRESSION  - alcohol use disorder, severe  - anxiety, unspecified, resolved    PLAN     · continue patient on ABU protocol.  · Breathalyzer daily and urine toxicology at least three times per week.  · VS daily x 3 days.  · CBC and CMP unremarkable, LFTs WNL  · Patient counseled on  abstinence from alcohol.    Medications: Continue current medications.  · Recommend melatonin nightly qhs for sleep.   ·  Pt reports low BP and feeling tired with taking qhs trazodone. Recommend utilizing Trazodone 25-50 mg PO nightly for insomnia only if melatonin is ineffective.  · Discussed possibility of SSRI or other antidepressant for anxiety with patient at admit. He is open to the possibility but patient and MD agreed to revisit this option as he progresses in program - at admit had recent  increase in Remeron dosage (7.5 to 15 nightly) and patient feels he is sleeping well with anxiety lessened and restricted to night time only.  Revisited SSRI 4/26/19, patient but felt well controlled. Pt changed to trazodone 4/30 2/2 increased appetite.  Patient amenable to continue trazodone and denies any ongoing anxiety  · Pt stated that he stopped nalrexone ~ 2 weeks ago. Discussed importance of this medication and encouraged to continue medication if he feels he has ongoing cravings- naltrexone 50 mg (Rx'd 4/16)  · Medications for alcohol use disorder were discussed including acamprosate, naltrexone and disulfiram.     Status: Continue treatment on ABU    Taylor Tom MD   [employer] [facility] PGY2

## 2019-05-13 NOTE — PLAN OF CARE
05/13/19 1000   Activity/Group Therapy Checklist   Group Relapse Prevention   Attendance Attended   Follows Direction Followed directions   Group Interactions/Observations Interacted appropriately   Affect/Mood Range Normal range   Affect/Mood Display Appropriate   Goal Progression Progressing

## 2019-05-14 ENCOUNTER — TELEPHONE (OUTPATIENT)
Dept: CARDIOLOGY | Facility: CLINIC | Age: 64
End: 2019-05-14

## 2019-05-14 ENCOUNTER — HOSPITAL ENCOUNTER (OUTPATIENT)
Dept: PSYCHIATRY | Facility: HOSPITAL | Age: 64
Discharge: HOME | End: 2019-05-14
Attending: PSYCHIATRY & NEUROLOGY
Payer: COMMERCIAL

## 2019-05-14 ENCOUNTER — LAB VISIT (OUTPATIENT)
Dept: LAB | Facility: HOSPITAL | Age: 64
End: 2019-05-14
Attending: INTERNAL MEDICINE
Payer: COMMERCIAL

## 2019-05-14 DIAGNOSIS — F10.20 ALCOHOL USE DISORDER, SEVERE, DEPENDENCE: Primary | ICD-10-CM

## 2019-05-14 DIAGNOSIS — F10.20 ALCOHOL USE DISORDER, SEVERE, DEPENDENCE: ICD-10-CM

## 2019-05-14 DIAGNOSIS — F10.20 ALCOHOL USE DISORDER, SEVERE, IN CONTROLLED ENVIRONMENT: ICD-10-CM

## 2019-05-14 DIAGNOSIS — I50.9 CONGESTIVE HEART FAILURE, UNSPECIFIED HF CHRONICITY, UNSPECIFIED HEART FAILURE TYPE: Primary | ICD-10-CM

## 2019-05-14 DIAGNOSIS — K21.9 GASTROESOPHAGEAL REFLUX DISEASE, ESOPHAGITIS PRESENCE NOT SPECIFIED: ICD-10-CM

## 2019-05-14 DIAGNOSIS — F10.931 ALCOHOL WITHDRAWAL SYNDROME, WITH DELIRIUM: ICD-10-CM

## 2019-05-14 LAB
ALBUMIN SERPL BCP-MCNC: 4.1 G/DL (ref 3.5–5.2)
ALP SERPL-CCNC: 70 U/L (ref 55–135)
ALT SERPL W/O P-5'-P-CCNC: 20 U/L (ref 10–44)
ANION GAP SERPL CALC-SCNC: 12 MMOL/L (ref 8–16)
AST SERPL-CCNC: 45 U/L (ref 10–40)
BILIRUB SERPL-MCNC: 0.9 MG/DL (ref 0.1–1)
BREATH ALCOHOL: 0
BUN SERPL-MCNC: 5 MG/DL (ref 8–23)
CALCIUM SERPL-MCNC: 10.1 MG/DL (ref 8.7–10.5)
CHLORIDE SERPL-SCNC: 102 MMOL/L (ref 95–110)
CO2 SERPL-SCNC: 24 MMOL/L (ref 23–29)
CREAT SERPL-MCNC: 1 MG/DL (ref 0.5–1.4)
EST. GFR  (AFRICAN AMERICAN): >60 ML/MIN/1.73 M^2
EST. GFR  (NON AFRICAN AMERICAN): >60 ML/MIN/1.73 M^2
GLUCOSE SERPL-MCNC: 136 MG/DL (ref 70–110)
MAGNESIUM SERPL-MCNC: 1.5 MG/DL (ref 1.6–2.6)
POTASSIUM SERPL-SCNC: 4 MMOL/L (ref 3.5–5.1)
PROT SERPL-MCNC: 7.1 G/DL (ref 6–8.4)
SODIUM SERPL-SCNC: 138 MMOL/L (ref 136–145)

## 2019-05-14 PROCEDURE — 90853 GROUP PSYCHOTHERAPY: CPT | Mod: ,,, | Performed by: PSYCHOLOGIST

## 2019-05-14 PROCEDURE — 99232 PR SUBSEQUENT HOSPITAL CARE,LEVL II: ICD-10-PCS | Mod: ,,, | Performed by: PSYCHIATRY & NEUROLOGY

## 2019-05-14 PROCEDURE — 90853 PR GROUP PSYCHOTHERAPY: ICD-10-PCS | Mod: ,,, | Performed by: PSYCHOLOGIST

## 2019-05-14 PROCEDURE — 90853 GROUP PSYCHOTHERAPY: CPT

## 2019-05-14 PROCEDURE — 83735 ASSAY OF MAGNESIUM: CPT

## 2019-05-14 PROCEDURE — 36415 COLL VENOUS BLD VENIPUNCTURE: CPT

## 2019-05-14 PROCEDURE — 99232 SBSQ HOSP IP/OBS MODERATE 35: CPT | Mod: ,,, | Performed by: PSYCHIATRY & NEUROLOGY

## 2019-05-14 PROCEDURE — 80053 COMPREHEN METABOLIC PANEL: CPT

## 2019-05-14 PROCEDURE — 90853 GROUP PSYCHOTHERAPY: CPT | Performed by: SOCIAL WORKER

## 2019-05-14 RX ORDER — NALTREXONE HYDROCHLORIDE 50 MG/1
50 TABLET, FILM COATED ORAL DAILY
Qty: 30 TABLET | Refills: 0 | Status: SHIPPED | OUTPATIENT
Start: 2019-05-14 | End: 2019-06-13

## 2019-05-14 RX ORDER — GABAPENTIN 300 MG/1
300 CAPSULE ORAL NIGHTLY
Qty: 30 CAPSULE | Refills: 2 | Status: SHIPPED | OUTPATIENT
Start: 2019-05-14 | End: 2019-10-07

## 2019-05-14 NOTE — PROGRESS NOTES
Group Psychotherapy (PhD/LCSW)    Site: [facility]    Clinical status of patient: Intensive Outpatient Program (IOP)    Date: 5/14/2019    Group Focus: ACT Group Therapy    Length of service: 38749 - 45-50 minutes    Number of patients in attendance: 10    Referred by: Addictive Behavior Unit Treatment Team    Target symptoms: Alcohol Abuse    Patient's response to treatment: Active Listening; Self-disclosure    Progress toward goals: Progressing adequately    Interval History: Session focus was Values.  Patients were introduced to values and provided with the values table to complete.  Each patient discussed a value that is most important to focus on at this time.    Diagnosis: alcohol use disorder, severe, dependence,     Plan: Continue treatment on ABU

## 2019-05-14 NOTE — PROGRESS NOTES
Group Psychotherapy (PhD/LCSW)    Site: [facility]    Clinical status of patient: Intensive Outpatient Program (IOP)    Date: 5/14/2019    Group Focus: The Dynamics of Relationship    Length of service: 42866 - 45-50 minutes    Number of patients in attendance: 4    Referred by: Addictive Behavior Unit Treatment Team    Target symptoms: Alcohol Abuse    Patient's response to treatment: Active Listening and Self-disclosure    Progress toward goals: Progressing adequately    Interval History: Discussed the nature of trust issues and communication strategies for coping with them effectively.     Diagnosis: alcohol use disorder, severe, dependence    Plan: Continue treatment on ABU

## 2019-05-14 NOTE — TELEPHONE ENCOUNTER
I called patient and informed him of low Magnesium. He will buyy Mg oxide voer the counter and take. Will recheck Mg in 2 weeks.    MAXIM

## 2019-05-14 NOTE — DISCHARGE SUMMARY
"5/14/2019 8:43 AM  Donald Warren Abadie  1955  [id number]    [facility] Intensive [facility]patient [facility] Progress Note    STATUS:  Intensive Outpatient Program (IOP)    SUBJECTIVE:   Chief Complaint: substance abuse    History of Present Illness:   Donald Warren Abadie is a 63 y.o. male with a past psychiatric history of anxiety, unspecified , who presented to [facility] IOP due to alcohol abuse.     Patient was recently seen by Psychiatry CL service after a recent hospital stay, per their note 3/27/19:  "As per the patient and the daughter, the patient has been drinking since he was 14 years old. Pt began drinking heavily after retiring five years ago and as per the daughter, it has worsened over the past year. During the past two months he has left the house only to get more alcohol and has not been participating in his normal activities. The patient says that he was drinking about 18 drinks per day. Attempted sobriety for the first time 8 weeks ago and experienced tremors and withdrawal symptoms (denies DTs) and resumed drinking. The patient states that he tried quitting alcohol 3.5 weeks ago because he was "feeling bad" and also accidentally overdosed on his BB and flecainide to feel better, which resulted in his [facility] admission. He endorsed sx of EtOH withdrawal on initial presentation, including anxiety, diaphoresis, hallucinations, and hallucinations.      The daughter believes that he is drinking more because he has nothing to occupy since retiring. She also thinks that his anxiety and depression have gotten worse and he uses drinking as a coping mechanism. The patient has had anxiety all his life, but it has worsened over the past few years. Anxiety is worse at night and reports poor sleep as a result. His depression has also worsened. He has tried Ativan in the past but was taken off of it because, as daughter states, he was addicted to it. He has also tried Effexor, Lexapro and Cymbalta. The daughter " "states that he was never on them long enough for them to work. She saw a difference with Cymbalta but thinks he stopped taking it because of the side effects. "    =================================    - Patient confirmed psych hx in above report. Was admitted to the hospital with extensive ICU stay from 3/15/19-3/29/19, was admitted following an accidental overdose of HTN medications, per family alcohol involved with the incident and patient showed signs of alcohol withdrawal when admitted.     Pleasant, linear throughout interview. Says only was drinking beer, confirms 18 beers per dayno liquor or wine. Says once he had no work obligations he started drinking all day (vs drinks after work). Drank 12 oz bud lights. Last drink was 3/15/19 after which patient was admitted to hospital (see above). Cites "dying twice" in the ICU as a wake up call - "I don't want to die!". Confirmed withdrawal symptoms previously reported when he tried to quit - says the first time stopped 2 weeks cold turkey (longest period sobriety since 14 y/o), second time was one week also with withdrawal symptoms. Says both times eventually the withdrawal symptoms went away but took about a week. Denies any seizures at that time, denies hx seizures. Says has been drinking daily since 14 y/o but denies any issues with work - "I never missed a day of work". Says after halfway is when he started to acknowledge possibility of a problem "you better slow down". Denies alcohol related social problems. Cites medical problems and ICU stay as main wake-up calls.     Reports ongoing anxiety worse at night, says often affects his sleep. Wide ranging anxiety, says he can wake from sleep anxious and be unable to return to sleep. Started Remeron since discharge with planned increase to 15 mg soon. Feels it is helping with sleep "for the most part" and thinks he has some daytime benefits as well regarding anxiety. Denies mood issues. Relates to halfway, says " "when he was working he was "wore out" at home and didn't have trouble with generalized anxiety or insomnia. Denies any hx SI/HI. During aforementioned withdrawals denies any periods of disorientation or AVH, though collateral reports indicate likely hx DTs. Does acknowledge he does not remember much of ICU stay (chart review with possible AVH). Denies illicit drug use, denies hx addiction issues with illicits.    Denies recent mood issues. Reports sleep and anxiety has improved since starting Remeron. Otherwise pan negative on SIGECAPS since recent hospital discharge.     =====================    INTERVAL HX    4/15/19  Patient doing well today. Had a good weekend, worked on pool this weekend. Mood stable and good. Eating and sleeping well at home. Reports increased appetite with Remeron. Denies other side effects. Denies SI/HI/AVH. Afternoon AA meetings going well, patient attending daily. Feels he has been surprised he enjoys AA as much as he does. Good insight to benefits of daily attendance even after maintaining sobriety, discussed people at meetings that have been sober ten years. In process of finding a sponsor. Group here going well. Denies cravings. Says anxiety is improving "everything is getting better". Patient with no other questions for MD, and does not have any particular topics he would like to discuss when offered.     Toxicology Screen: Alcohol biomarkers 4/12 negative; Utox 4/12 negative; breath alcohol 4/12 negative   Medication Side Effects: None  Cravings: no  No other acute psychiatric issues reported at this time.    4/17/19  Patient doing well. Reports mood doing good and stable. Says son-in-law came in for family today, explained for privacy reasons son-in-law unable to attend group meetings with other patients, patient voiced understanding. Says main result of family day is patient would like to address his hurt knee sooner rather than later. Picked up rx, plans to trial naltrexone at home " this weekend in case he gets nauseous. AA meetings going well, group here going well. Denies SI/HI/AVH. Plans to address knee as soon as done with program. Denies cravings , does report eating more. Sleeping well at home. Reports night time anxiety continues to improve. Denies medication side effects outside of possible increased appetite. Patient with no other questions for MD, and does not have any particular topics he/she would like to discuss when offered.    Toxicology Screen: Alcohol biomarkers 4/12 negative, 4/15 in process; Utox 4/15 negative; breath alcohol 4/16 negative   Medication Side Effects: increased appetite, though likely also due to sobriety and decreased etoh calories  Cravings: no  No other acute psychiatric issues reported at this time.    4/22/19  Patient doing well today. Reports had a good weekend. Didn't trial naltrexone over the weekend, plans to soon but plans to start by taking a quarter to minimize side effects. Patient had crawfish boil over the weekend, was able to avoid drinking and denies cravings. Denies physical complaints outside of knee pain. Eating and sleeping well. Planning to move remeron dose up to avoid morning grogginess. Denies SI/HI/AVH. AA meetings going well, group here going well. Denies medication side effects otherwise. Patient with no other questions for MD, and does not have any particular topics he/she would like to discuss when offered.    Toxicology Screen: Alcohol biomarkers 4/15 negative; Utox 4/17 negative; breath alcohol 4/18 negative   Medication Side Effects: denied  Cravings: no  No other acute psychiatric issues reported at this time.    4/24/19  Patient doing well, eating well at home. Reports trouble sleeping last night. Says Remeron helps him sleep but feels he may need to take earlier in the evening to time effect, advised that is fine and he can time as he pleases in the evening. Pleasant discussion about jazz fest, knee/foot issues. Reports good  mood and night time anxiety not an issue or keeping him from sleeping, denies daytime anxiety. Denies SI/HI/AVH. Denies medication side effects. Endorses knee pain. AA going well, good insight. Group here going well. Denies cravings, patient says memories of ICU stay keep him from wanting. Patient didn't trail naltrexone over the weekend, will revisit on Friday for possible weekend trial.    Toxicology Screen: Alcohol biomarkers 4/15 negative. 4/22 pending; Utox 4/22 negative; breath alcohol 4/23 negative   Medication Side Effects: denied  Cravings: no  No other acute psychiatric issues reported at this time.    4/26/19  Patient doing well today. Calm, conversational, pleasant. Arrangements for knee surgery after this program ongoing. Sleep continues to improve. Remeron has been helping his sleep and he has been able to take the pill at a time better for him (earlier). Discussed SSRI, patient. Denies SI/HI/AVH. AA meetings going well, group here going well. Denies mood issues or major fluctuations. Denies medication side effects. Knee pain but no other physical complaints. Reports anxiety much improved since admit, denies any night time anxiety that affects sleep. Denies cravings. Patient with no other questions for MD, and does not have any particular topics he/she would like to discuss when offered.    Toxicology Screen: Alcohol biomarkers 4/22 negative; Utox 4/24 negative; breath alcohol 4/25 negative   Medication Side Effects: denied  Cravings: no  No other acute psychiatric issues reported at this time.    4/29/19  Patient doing well today. Calm, conversational, pleasant. Patient reports he has been eating more sweets, had BG at home in 400s. Says last was ~200 when measured, will measure at home. Hadn't checked in a couple weeks, has discussed this with his PCP (Dr. Larry) in past and has an appointment with him in the next two weeks. Denies any physical symptoms symptoms during this. Endorses good and stable  "mood. Denies SI/HI/AVH. Remeron continues to be helpful, taking it earlier to avoid daytime grogginess. Concern for appetite effect as above, will continue to monitor. Reports anxiety continues to be improved, with little during daytime or night (vs staying up with anxiety previously). No alcohol cravings, but reports cravings for sweets. AA meetings going well, group here going well.     Toxicology Screen: Alcohol biomarkers 4/22 negative 4/29 in process; Utox 4/26 negative 4/29 in process; breath alcohol 4/29 negative   Medication Side Effects: denied  Cravings: no  No other acute psychiatric issues reported at this time.    05/01/19  Medications adjusted by Dr. Elaine yesterday as pt had reported weight gain. Remeron was discontinued and pt was started on trazodone 50 mg for sleep qhs. Pt is calm, cooperative, pleasant and conversational. He states that he is "feeling good" this morning and reports his mood has been good. Pt discusses his blood sugars being high and how he needs to follow up with his PCP; also discussed how he was worried about the increased dose of remeron was impacting his appetite and causing his sugars to rise. He states that Dr. Elaine changed his medication to trazodone and feels that it is working well. He reports sleeping aell and denies any grogginess, and expresses that he. He states that his anxiety is doing well and has no complaints. Denies grogginess, feels that trazodone has been helpful.  Denies alcohol cravings, AA meetings going well, groups going well. Denies SI/HI.     Toxicology Screen: Alcohol biomarkers 4/22 negative 4/29 in process; Utox 4/26 negative 4/29 neg; breath alcohol 4/29 negative, utox 501 neg and breath alcohol neg   Medication Side Effects: denied  Cravings: no  No other acute psychiatric issues reported at this time     05/03/2017  Pt reports that he has been doing well, an describes his mood as "good". Blood sugars still high but coming down. Discusses " "difficulty fixing house. Sleeping well, states he has been sleeping just as good as the remeron. Appetite is "good" .Pt states that his anxiety is "almost gone". Attending AA meetings and says it's going well, says groups here are going well. Denies cravings and states that whenever he sees a beer he thinks about being in the ICU and has no desire to be back in this position. Discusses weekend plans of working on his house. Denies SI/HI    Toxicology Screen: Alcohol biomarkers 4/22 negative 4/29 in process; Utox 4/26 negative 4/29 neg; breath alcohol 4/29 negative   Medication Side Effects: denied  Cravings: denied  No other acute psychiatric issues reported at this time     5/06/2017  Pt reports doing well, and states his mood is "good". Discussed his weekend, spent time with his family and doing home repairs. Denies alcohol cravings, denies relapses. Attending AA meetings over the weekend which have been going well. Groups in the [facility] going well without issues. . Discussed decreasing blood sugars which pt is happy about and he is looking forward to telling his PCP about his progress at his appointment Friday.Patient education provided-discussed how addiction is a chronic disease. States the trazodone continues to work well for sleep and denies SE. Denies SI/HI/anxiety.  No cravings, relapses. AA meetings going well.    Toxicology Screen: Alcohol biomarkers 4/22 negative 4/29 in process; breath alcohol negative and UDS negative   Medication Side Effects: denied  Cravings: denied  No other acute psychiatric issues reported at this time     5/08/2019  Patient with medication readjustments since last spoke to him; pt was advised to only take trazodone 25-50 mg  on a PRN basis for sleep as there was some concern it was causing him to be too groggy in the mornings and BP low. Pt states that he took "a little less than half a pill" last night so he could sleep. He states that he was afraid of "quitting cold turkey". Pt " "states that his BP was low this AM and that when he walked from the parking garage to the clinic he felt "wore out". He denies light headedness of dizziness. Discussed that there are unlikely to be negative sx of discontinuing trazodone suddenly att that dose and re-emphasized utilizing melatonin for sleep as needed as it is unlikely to effect his BP. Pt discusses how daughter was worried that he was drinking again while he was meeting with Dr. Elaine because of seemed "tired". Pt adamantly denies drinking or havign cravings. He states that  meetings have been going well and has been attending meetings closer to his home.    Denies cravings, relapses. Groups in [facility] going "pretty good".     Toxicology Screen: Alcohol biomarkers 5/06 neg, 5/06 and 5/07 breath alcohol negative and UDS negative   Medication Side Effects: denied  Cravings: denied  No other acute psychiatric issues reported at this time     5/10/2019  Pt states his mood is "good", states he was late this morning because he had an appointment w/ Dr. Larry; he states that his appointment went well this morning and plans on seeing him again prior to his knee operation. Pt states that he stopped the trazodone and feels much better in terms of energy and sleep. Pt states that he took melatonin and feels that is "all I need". Pt says that he is looking forward to surgery for his knee and getting back to doing things he enjoys.Reflected on his drinking and the consequences. Denies cravings, denies relapses. Going to AA    Toxicology Screen: Alcohol biomarkers 5/06 neg, 5/06 and 5/07 breath alcohol negative and UDS negative   Medication Side Effects: denied  Cravings: denied  No other acute psychiatric issues reported at this time     5/14/2019  Pt states that his mood is "ok". Pt states that he had some anxiety yesterday and took a trazodone last night; he attributes it to being told short notice that it was his last day in the program as well as getting " ready for the  Weekend (going camping w/ some friends from  ). Pt going to AA meetings as well. Pt endorses some cravings but states that he has not been taking the nalrexone,  denies relapses. Pt needs prescription called in for naltexone. Denies SI/HI/AVH.    Toxicology Screen: Alcohol biomarkers 5/07 negative. etoh breath 5/13 neg  Medication Side Effects: denied  Cravings: denied  No other acute psychiatric issues reported at this time    Summary of progress in Program  Pt remained sober while in program and attended IOP as expected. Pt attended AA meetings and participated in groups as appropriate. During course of program pt was started on remeron to help with sleep and mood but it interfered with blood sugar readings as it increased his appetite. Pt was then switched to trazodone to help with sleep and mood but medication resulted in low BP readings. Pt's mood and anxiety was able to be controlled by attending group therapy sessions and trazodone was ultimately discontinued and he was recommended to take only as needed for sleep if melatonin was ineffective. During course of program pt was started on naltrexone for cravings but pt self discontinued after 2 weeks. Recommended that pt continue this medication on discharge as it would aid with cravings; pt was agreeable    Allergies:  No known drug allergies  Medical/Surgical History:  Past Medical History:   Diagnosis Date    A-fib     Alcohol abuse     Arthritis     Chronic gout     Diabetes mellitus     DM (diabetes mellitus) 11/16/2016    Fatty liver     Hyperlipidemia     Renal cell cancer 2014     Past Surgical History:   Procedure Laterality Date    ABSCESS DRAINAGE      perirectal    COLONOSCOPY      FISTULOTOMY N/A 5/19/2015    [facility] Shane [facility] 2ND FLR    HAND SURGERY Left     HEMORRHOIDECTOMY N/A 5/19/2015    [facility] Shane [facility] 2ND FLR    KIDNEY SURGERY      partial left kidney  removal - [place]    PARTIAL NEPHRECTOMY Left August 2014    ROBOTIC ASSISTED LAPAROSCOPIC NEPHRECTOMY PARTIAL Left 8/28/2014    Performed by Fabian Estevez [facility] at [facility] OR [address] [place] [address]     OBJECTIVE:     Current Medications:   Infusions:    Scheduled:    PRN:    Home Medications:  Prior to Admission medications    Medication Sig Start Date End Date Taking? Authorizing Provider   allopurinol (ZYLOPRIM) 100 MG tablet TAKE 2 TABLETS BY MOUTH DAILY 2/22/19   Diana Hemphill MD   apixaban (ELIQUIS) 5 mg Tab Take 1 tablet (5 mg total) by mouth 2 (two) times daily. 3/4/19   Uriel Tolentino MD   aspirin 81 MG Chew Take 1 tablet (81 mg total) by mouth once daily. 3/4/19 3/3/20  Uriel Tolentino MD   cyanocobalamin (VITAMIN B-12) 1000 MCG tablet Take 1 tablet (1,000 mcg total) by mouth once daily. 3/30/19   DEAN Vigil MD   fenofibrate 160 MG Tab Take 1 tablet (160 mg total) by mouth every evening. 10/29/18   Bacilio Larry MD   flecainide (TAMBOCOR) 150 MG Tab Take 1 tablet (150 mg total) by mouth 2 (two) times daily. 3/7/19 4/9/19  Uriel Tolentino MD   folic acid (FOLVITE) 1 MG tablet Take 1 tablet (1 mg total) by mouth once daily. 3/30/19 3/29/20  DEAN Vigil MD   metoprolol tartrate (LOPRESSOR) 25 MG tablet NOT CURRENTLY TAKING Take 0.5 tablets (12.5 mg total) by mouth 2 (two) times daily. 3/29/19 3/28/20  DEAN Vigil MD   mirtazapine (REMERON) 15 MG tablet Take 1 tablet (15 mg total) by mouth every evening. 4/9/19 4/8/20  Bacilio Larry MD   multivitamin (THERAGRAN) tablet Take 1 tablet by mouth once daily. 3/30/19   DEAN Vigil MD   omega-3 acid ethyl esters (LOVAZA) 1 gram capsule Take 2 g by mouth 2 (two) times daily.    Historical Provider, MD   pantoprazole (PROTONIX) 40 MG tablet Take 1 tablet (40 mg total) by mouth once daily. 3/30/19 3/29/20  DEAN Vigil MD   ranitidine (ZANTAC) 150 MG capsule Take 150 mg by mouth 2 (two) times daily.    Historical Provider, MD  "  rosuvastatin (CRESTOR) 10 MG tablet Take 1 tablet (10 mg total) by mouth once daily. 3/4/19 3/3/20  Uriel Tolentino MD   thiamine 100 MG tablet Take 1 tablet (100 mg total) by mouth once daily. 3/30/19   DEAN Vigil MD     NOT CURRENTLY TAKING METOPROLOL    Vital Signs:  There were no vitals filed for this visit.      Mental Status Exam:  Appearance: unremarkable, age appropriate, casually dressed, good grooming  Level of Consciousness: alert  Behavior/Cooperation: friendly and cooperative  Psychomotor: unremarkable   Speech: normal tone, normal rate, normal pitch, normal volume, accented  Language: english, fluid  Orientation: grossly oriented  Attention Span/Concentration: intact  Memory: grossly intact  Mood: "good"  Affect: normal  Thought Process: linear, normal and logical  Associations: normal and logical  Thought Content: normal, no suicidality, no homicidality, delusions, or paranoia   Fund of Knowledge: Aware of current events   Abstraction: intact to conversation  Insight: good  Judgment: good    Labs/Imaging/Studies:   Recent Results (from the past 24 hour(s))   POCT BREATH ALCOHOL TEST    Collection Time: 05/13/19 11:25 AM   Result Value Ref Range    Breath Alcohol 0.000    Toxicology screen, urine    Collection Time: 05/13/19 11:25 AM   Result Value Ref Range    Alcohol, Urine <10 <10 mg/dL    Benzodiazepines Negative     Methadone metabolites Negative     Cocaine (Metab.) Negative     Opiate Scrn, Ur Negative     Barbiturate Screen, Ur Negative     Amphetamine Screen, Ur Negative     THC Negative     Phencyclidine Negative     Creatinine, Random Ur 38.0 23.0 - 375.0 mg/dL    Toxicology Information SEE COMMENT    Comprehensive metabolic panel    Collection Time: 05/14/19  8:04 AM   Result Value Ref Range    Sodium 138 136 - 145 mmol/L    Potassium 4.0 3.5 - 5.1 mmol/L    Chloride 102 95 - 110 mmol/L    CO2 24 23 - 29 mmol/L    Glucose 136 (H) 70 - 110 mg/dL    BUN, Bld 5 (L) 8 - 23 mg/dL    " Creatinine 1.0 0.5 - 1.4 mg/dL    Calcium 10.1 8.7 - 10.5 mg/dL    Total Protein 7.1 6.0 - 8.4 g/dL    Albumin 4.1 3.5 - 5.2 g/dL    Total Bilirubin 0.9 0.1 - 1.0 mg/dL    Alkaline Phosphatase 70 55 - 135 U/L    AST 45 (H) 10 - 40 U/L    ALT 20 10 - 44 U/L    Anion Gap 12 8 - 16 mmol/L    eGFR if African American >60 >60 mL/min/1.73 m^2    eGFR if non African American >60 >60 mL/min/1.73 m^2      ASSESSMENT     Donald Warren Abadie is a 63 y.o. male with a past psychiatric history of anxiety, who presented to [facility] IOP due to alcoho abuse. 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 Patient and family describe worsening alcohol intake leading to significant social isolation. Patient with history legal complications (DWI) from alcohol use. Patient very recently admitted for an extensive ICU stay after accidentally overdosing on HTN medications while intoxicated from alcohol use. At presentation to the ER patient was noted to have multiple alcohol withdrawal symptoms as well reports from family consistent with Delirium Tremens. Patient is also high risk for mood disorder if he were to relapse, with multiple static factors (, older white male with medical comorbidities and hx substance abuse) that would contribute to risk of completed suicide. Pt remained sober throughout his stay in the program and participated appropriately. Pt to follow up with appropriate aftercare as below.    IMPRESSION  - alcohol use disorder, severe  - anxiety, unspecified, resolved    PLAN       · Patient counseled on abstinence from alcohol.  · Patient to follow up w/ psychologist and psychiatrist  · Weekly [facility] aftercare meetings  · Will call in prescription for naltrexone today upon discharge  · 90 AA meetings in 90 days followed by 3 meetings a week for 3 months      Medications: Continue current medications.  · Recommend melatonin nightly qhs for sleep.   ·  Pt  reports low BP and feeling tired with taking qhs trazodone. Recommend utilizing Trazodone 25-50 mg PO nightly for insomnia only if melatonin is ineffective.  · Discussed possibility of SSRI or other antidepressant for anxiety with patient at admit. He is open to the possibility but patient and MD agreed to revisit this option as he progresses in program - at admit had recent  increase in Remeron dosage (7.5 to 15 nightly) and patient feels he is sleeping well with anxiety lessened and restricted to night time only.  Revisited SSRI 4/26/19, patient but felt well controlled. Pt changed to trazodone 4/30 2/2 increased appetite.  Patient amenable to continue trazodone and denies any ongoing anxiety  · Continue naltrexone 50 mg (Rx'd 4/16).   · Medications for alcohol use disorder were discussed including acamprosate, naltrexone and disulfiram.     Status: pt discharged from [facility]    Taylor Tom MD   [employer] [facility] PGY2

## 2019-05-14 NOTE — PROGRESS NOTES
Group Psychotherapy (PhD/LCSW)    Site: [facility]    Clinical status of patient: Intensive Outpatient Program (IOP)    Date: 5/14/2019    Group Focus: Psychodynamic Group Psychotherapy    Length of service: 12573 - 45-50 minutes    Number of patients in attendance: 3    Referred by: Addictive Behavior Unit Treatment Team    Target symptoms: Alcohol Abuse    Patient's response to treatment: Active Listening and Self-disclosure    Progress toward goals: Progressing adequately    Interval History: Discussed progress in treatment and plans for keeping busy after discharge fro ABU later today.    Diagnosis: alcohol use disorder, severe, dependence    Plan: Continue treatment on ABU

## 2019-05-21 ENCOUNTER — OFFICE VISIT (OUTPATIENT)
Dept: PSYCHIATRY | Facility: CLINIC | Age: 64
End: 2019-05-21
Payer: COMMERCIAL

## 2019-05-21 DIAGNOSIS — F10.21 ALCOHOL USE DISORDER, SEVERE, IN EARLY REMISSION: Primary | ICD-10-CM

## 2019-05-21 DIAGNOSIS — F41.9 ANXIETY DISORDER, UNSPECIFIED TYPE: ICD-10-CM

## 2019-05-21 PROCEDURE — 90853 GROUP PSYCHOTHERAPY: CPT | Mod: S$GLB,,, | Performed by: PSYCHOLOGIST

## 2019-05-21 PROCEDURE — 90853 PR GROUP PSYCHOTHERAPY: ICD-10-PCS | Mod: S$GLB,,, | Performed by: PSYCHOLOGIST

## 2019-05-22 NOTE — PROGRESS NOTES
Group Psychotherapy    Site: [facility]    Clinical status of patient: Outpatient    5/21/2019    Length of service:[id number]    Referred by: Addictive Behavior Unit     Number of patients in attendance: 3    Target symptoms: alcohol abuse, anxiety     Patient's response to intervention:  The patient's response to intervention is active listening, self-disclosure.    Progress toward goals and other mental status changes:  The patient's progress toward goals is good.    Interval history: First day in the aftercare group. Pt shared his story with the group.     Diagnosis: Alcohol Use d/o, severe, in early remission.     Plan: group psychotherapy    Return to clinic: as scheduled

## 2019-05-28 ENCOUNTER — OFFICE VISIT (OUTPATIENT)
Dept: PSYCHIATRY | Facility: CLINIC | Age: 64
End: 2019-05-28
Payer: COMMERCIAL

## 2019-05-28 DIAGNOSIS — F41.9 ANXIETY DISORDER, UNSPECIFIED TYPE: ICD-10-CM

## 2019-05-28 DIAGNOSIS — F10.21 ALCOHOL USE DISORDER, SEVERE, IN EARLY REMISSION: Primary | ICD-10-CM

## 2019-05-28 PROCEDURE — 90853 GROUP PSYCHOTHERAPY: CPT | Mod: S$GLB,,, | Performed by: PSYCHOLOGIST

## 2019-05-28 PROCEDURE — 90853 PR GROUP PSYCHOTHERAPY: ICD-10-PCS | Mod: S$GLB,,, | Performed by: PSYCHOLOGIST

## 2019-05-29 NOTE — PROGRESS NOTES
Group Psychotherapy    Site: [facility]    Clinical status of patient: Outpatient    5/28/2019    Length of service:[id number]    Referred by: Addictive Behavior Unit     Number of patients in attendance: 5    Target symptoms: alcohol abuse, anxiety     Patient's response to intervention:  The patient's response to intervention is active listening, self-disclosure.    Progress toward goals and other mental status changes:  The patient's progress toward goals is good.    Interval history: Pt shared his story with a new member of the group.      Diagnosis: Alcohol Use d/o, severe, in early remission.     Plan: group psychotherapy    Return to clinic: as scheduled

## 2019-06-05 ENCOUNTER — OFFICE VISIT (OUTPATIENT)
Dept: ORTHOPEDICS | Facility: CLINIC | Age: 64
End: 2019-06-05
Payer: COMMERCIAL

## 2019-06-05 VITALS — HEIGHT: 65 IN | BODY MASS INDEX: 29.82 KG/M2 | WEIGHT: 179 LBS

## 2019-06-05 DIAGNOSIS — M17.11 PRIMARY OSTEOARTHRITIS OF RIGHT KNEE: Primary | ICD-10-CM

## 2019-06-05 PROCEDURE — 99999 PR PBB SHADOW E&M-EST. PATIENT-LVL III: ICD-10-PCS | Mod: PBBFAC,,, | Performed by: ORTHOPAEDIC SURGERY

## 2019-06-05 PROCEDURE — 99213 OFFICE O/P EST LOW 20 MIN: CPT | Mod: S$GLB,,, | Performed by: ORTHOPAEDIC SURGERY

## 2019-06-05 PROCEDURE — 3008F PR BODY MASS INDEX (BMI) DOCUMENTED: ICD-10-PCS | Mod: CPTII,S$GLB,, | Performed by: ORTHOPAEDIC SURGERY

## 2019-06-05 PROCEDURE — 99999 PR PBB SHADOW E&M-EST. PATIENT-LVL III: CPT | Mod: PBBFAC,,, | Performed by: ORTHOPAEDIC SURGERY

## 2019-06-05 PROCEDURE — 99213 PR OFFICE/OUTPT VISIT, EST, LEVL III, 20-29 MIN: ICD-10-PCS | Mod: S$GLB,,, | Performed by: ORTHOPAEDIC SURGERY

## 2019-06-05 PROCEDURE — 3008F BODY MASS INDEX DOCD: CPT | Mod: CPTII,S$GLB,, | Performed by: ORTHOPAEDIC SURGERY

## 2019-06-05 NOTE — PROGRESS NOTES
Subjective:      Patient ID: Donald Warren Abadie is a 63 y.o. male.    Chief Complaint: Pain and Swelling of the Right Knee    HPI  Donald Warren Abadie has right knee pain.  The pain has worsened. The pain is located in the anterior, medial aspect of the knee.  There  is not radiation. There is associated stiffness.   There is not catching and locking. The pain is described as achy. The pain is aggravated by sitting, standing, walking.  It is alleviated by nothing consistently.  There is associated back pain. The symptoms have worsened to the point where it is interfering with his activities of daily living.  He has difficulty with stairs, getting dressed and getting in an out of a car.   His history, medications and problem list were reviewed. He is no longer drinking  Past Medical History:   Diagnosis Date    A-fib     Alcohol abuse     Arthritis     Chronic gout     Diabetes mellitus     DM (diabetes mellitus) 11/16/2016    Fatty liver     Hyperlipidemia     Renal cell cancer 2014       Review of Systems   Constitution: Negative for chills, fever and night sweats.   HENT: Negative for hearing loss.    Eyes: Negative for blurred vision and double vision.   Cardiovascular: Negative for chest pain, claudication and leg swelling.   Respiratory: Negative for shortness of breath.    Endocrine: Negative for polydipsia, polyphagia and polyuria.   Hematologic/Lymphatic: Negative for adenopathy and bleeding problem. Does not bruise/bleed easily.   Skin: Negative for poor wound healing.   Musculoskeletal: Positive for joint pain.   Gastrointestinal: Negative for diarrhea and heartburn.   Genitourinary: Negative for bladder incontinence.   Neurological: Negative for focal weakness, headaches, numbness, paresthesias and sensory change.   Psychiatric/Behavioral: The patient is not nervous/anxious.    Allergic/Immunologic: Negative for persistent infections.         Objective:      Body mass index is 29.79  "kg/m².  Vitals:    19 1515   Weight: 81.2 kg (179 lb 0.2 oz)   Height: 5' 5" (1.651 m)           General    Constitutional: He is oriented to person, place, and time. He appears well-developed and well-nourished.   HENT:   Head: Normocephalic and atraumatic.   Eyes: EOM are normal.   Cardiovascular: Normal rate.    Pulmonary/Chest: Effort normal.   Neurological: He is alert and oriented to person, place, and time.   Psychiatric: He has a normal mood and affect. His behavior is normal.     General Musculoskeletal Exam   Gait: abnormal and antalgic       Right Knee Exam     Inspection   Erythema: absent  Scars: absent  Swelling: present  Effusion: absent  Deformity: present (mild varus)  Bruising: absent    Tenderness   The patient is tender to palpation of the medial joint line.    Crepitus   The patient has crepitus of the patella.    Range of Motion   Extension: 5   Flexion: 120     Tests   Ligament Examination Lachman: normal (-1 to 2mm)   MCL - Valgus: normal (0 to 2mm)  LCL - Varus: normal  Patella   Passive Patellar Tilt: neutral    Other   Sensation: normal    Left Knee Exam     Inspection   Erythema: absent  Scars: absent  Swelling: absent  Effusion: absent  Deformity: absent  Bruising: absent    Tenderness   The patient is experiencing no tenderness.     Range of Motion   Extension: 0   Flexion: 130     Tests   Stability Lachman: normal (-1 to 2mm)   MCL - Valgus: normal (0 to 2mm)  LCL - Varus: normal (0 to 2mm)  Patella   Passive Patellar Tilt: neutral    Other   Sensation: normal    Muscle Strength   Right Lower Extremity   Hip Abduction: 5/5   Quadriceps:  5/5   Hamstrin/5   Left Lower Extremity   Hip Abduction: 5/5   Quadriceps:  5/5   Hamstrin/5     Reflexes     Left Side  Quadriceps:  2+    Right Side   Quadriceps:  2+    Vascular Exam     Right Pulses  Dorsalis Pedis:      2+          Left Pulses  Dorsalis Pedis:      2+          Edema  Right Lower Leg: absent  Left Lower Leg: " absent    Radiographs taken today and reviewed by me demonstrate moderately severe arthritic change of the right KNEE(s).There  is not bone destruction.  There is not a fracture. The medial compartment is most involved.  There is a varus deformity.  The changes are tricompartmental.              Assessment:       Encounter Diagnosis   Name Primary?    Primary osteoarthritis of right knee Yes          Plan:       Kleber was seen today for pain and swelling.    Diagnoses and all orders for this visit:    Primary osteoarthritis of right knee      Treatment options were discussed. The surgical process of right knee replacement was discussed in detail with the patient including a detailed discussion of the procedure itself (including visual model, x-ray review, and literature review). The typical perioperative and post-operative course was discussed and perioperative risks were discussed to the patient's satisfaction.  Risks and complications discussed included but were not limited to the risks of anesthetic complications, infection, bleeding, wound healing complications, stiffness, aseptic loosening, instability, limb length inequality, neurologic dysfunction including numbness and weakness, additional surgery,  DVT, pulmonary embolism, perioperative medical risks (cardiac, pulmonary, renal, neurologic), and death and the patient elects to proceed.  The patient should get medically cleared and attend the joint seminar.He is aware that he has afib and a history of ETOH abuse which increase his risk of postoperative complication above baseline.  The risks and benefits of proceeding with the procedure were discussed specifically related to this.  We have decided to proceed.    Warfarin with ASA bridge.  Will switch to Eliquis day 7    Medical optimization pre-op    Same day protocol

## 2019-06-06 ENCOUNTER — TELEPHONE (OUTPATIENT)
Dept: ELECTROPHYSIOLOGY | Facility: CLINIC | Age: 64
End: 2019-06-06

## 2019-06-06 DIAGNOSIS — I48.0 PAROXYSMAL ATRIAL FIBRILLATION: Primary | ICD-10-CM

## 2019-06-06 NOTE — TELEPHONE ENCOUNTER
Spoke with patient. We have not seen him in a year and a half. States he had an episode of AF a couple of weeks ago and wants Dr Giang to tell him if it is ok to proceed with ortho surgery. Advised that he is overdue to be seen. Appt scheduled for 6/25/19 at 8am (EKG at 7:30am).

## 2019-06-06 NOTE — TELEPHONE ENCOUNTER
----- Message from Charleen Freitas MA sent at 6/6/2019 12:52 PM CDT -----  Contact: pt Daughter       ----- Message -----  From: Jay Ledezma  Sent: 6/6/2019  10:01 AM  To: Rahat Navarro Staff    Pt has been Afib and is having concerns about his upcoming surgery pt request call back to discuss     Pt daughter  can be reached at [phone]

## 2019-06-06 NOTE — TELEPHONE ENCOUNTER
----- Message from Charleen Freitas MA sent at 6/6/2019 12:52 PM CDT -----  Contact: pt Daughter       ----- Message -----  From: Jay Ledezma  Sent: 6/6/2019  10:01 AM  To: Rahat Navarro Staff    Pt has been Afib and is having concerns about his upcoming surgery pt request call back to discuss     Pt daughter  can be reached at [phone]

## 2019-06-07 ENCOUNTER — OFFICE VISIT (OUTPATIENT)
Dept: PSYCHIATRY | Facility: CLINIC | Age: 64
End: 2019-06-07
Payer: COMMERCIAL

## 2019-06-07 DIAGNOSIS — F41.9 ANXIETY: ICD-10-CM

## 2019-06-07 DIAGNOSIS — M17.11 PRIMARY OSTEOARTHRITIS OF RIGHT KNEE: Primary | ICD-10-CM

## 2019-06-07 DIAGNOSIS — F10.21 ALCOHOL USE DISORDER, SEVERE, IN EARLY REMISSION, DEPENDENCE: Primary | ICD-10-CM

## 2019-06-07 PROCEDURE — 90791 PSYCH DIAGNOSTIC EVALUATION: CPT | Mod: S$GLB,,, | Performed by: SOCIAL WORKER

## 2019-06-07 PROCEDURE — 90791 PR PSYCHIATRIC DIAGNOSTIC EVALUATION: ICD-10-PCS | Mod: S$GLB,,, | Performed by: SOCIAL WORKER

## 2019-06-09 ENCOUNTER — PATIENT MESSAGE (OUTPATIENT)
Dept: ELECTROPHYSIOLOGY | Facility: CLINIC | Age: 64
End: 2019-06-09

## 2019-06-10 ENCOUNTER — TELEPHONE (OUTPATIENT)
Dept: PREADMISSION TESTING | Facility: HOSPITAL | Age: 64
End: 2019-06-10

## 2019-06-10 ENCOUNTER — ANESTHESIA EVENT (OUTPATIENT)
Dept: SURGERY | Facility: HOSPITAL | Age: 64
DRG: 470 | End: 2019-06-10
Payer: COMMERCIAL

## 2019-06-10 DIAGNOSIS — Z01.818 PREOP EXAMINATION: Primary | ICD-10-CM

## 2019-06-10 NOTE — TELEPHONE ENCOUNTER
appt 6/21 didn't contact patient But left message wit call back number and appt info PLEASE PUT IN LAB ORDER I LOOKED NO ORDER.

## 2019-06-10 NOTE — PROGRESS NOTES
Psychiatry Initial Visit (PhD/LCSW)  Diagnostic Interview - CPT 77148    Date: 2019    Site: [facility]    [facility]     Clinical status of patient: Outpatient    Donald Warren Abadie, a 63 y.o. male, for initial evaluation visit.  Met with patient.    Chief complaint/reason for encounter: addictive disorder and anxiety    History of present illness: 63 year old male patient presents to the clinic today for initial visit with chief complaint of anxiety.  He was referred for individual therapy in the clinic, following successful completion of the ABU-IOP.  Was treated for alcohol use disorder and anxiety in the ABU.  He reports continued sobriety.  No relapses.  Has been attending AA meetings.  Attending aftercare group with Dr. Tyler.  Taking Natrexone as prescribed.  He verbalizes relapse prevention coping skills and healthy lifestyle changes.  Continues to have pain in his knee.  Reports that he will be having surgery done on his knee in the near future.  He is seeing cardiology for the management of his atrial fibrillation.  His anxiety is under control currently.  Taking Trazodone for sleep.  He is close with his daughter's family.        Pain: yes, knee pain        Symptoms:   · Mood: denied  · Anxiety: intermittent insomnia        · Substance abuse: alcohol use disorder, severe, in early remission      · Cognitive functioning: denied  · Health behaviors: noncontributory    Psychiatric history: has participated in counseling/psychotherapy on an outpatient basis in the past and currently under psychiatric care    Medical history: atrial fibrillation        Family history of psychiatric illness: none    Social history (marriage, employment, etc.): Patient is .  Lives alone.  One daughter (34).  Patient's parents are .  One sister, three brothers.  Two siblings are .  High school graduate.  Retired.  Worked in construction.  [employer].        Substance use:    Alcohol: in early remission      Drugs: none   Tobacco: none   Caffeine: occasional        Current medications and drug reactions (include OTC, herbal): see medication list     Strengths and liabilities: Strength: Patient accepts guidance/feedback, Strength: Patient is expressive/articulate., Strength: Patient is motivated for change., Strength: Patient has positive support network., Strength: Patient has reasonable judgment., Liability: Patient lacks coping skills.    Current Evaluation:     Mental Status Exam:  General Appearance:  unremarkable, age appropriate   Speech: normal tone, normal rate, normal pitch, normal volume      Level of Cooperation: cooperative      Thought Processes: normal and logical   Mood: euthymic      Thought Content: normal, no suicidality, no homicidality, delusions, or paranoia   Affect: congruent and appropriate   Orientation: Oriented x3   Memory: recent >  intact, remote >  intact   Attention Span & Concentration: intact   Fund of General Knowledge: intact and appropriate to age and level of education   Abstract Reasoning: not assessed    Judgment & Insight: intact     Language  intact     Diagnostic Impression - Plan:       ICD-10-CM ICD-9-CM   1. Alcohol use disorder, severe, in early remission, dependence F10.21 303.93   2. Anxiety F41.9 300.00       Plan:group psychotherapy, medication management by physician, RAQUEL and abstinence    Return to Clinic: as scheduled    Length of Service (minutes): 45

## 2019-06-10 NOTE — PRE ADMISSION SCREENING
Anesthesia Assessment: Preoperative EQUATION    Planned Procedure: Procedure(s) (LRB):  ARTHROPLASTY, KNEE, TOTAL-SAME DAY (Right)  Requested Anesthesia Type:Regional  Surgeon: Mikael Huerta MD  Service: Orthopedics  Known or anticipated Date of Surgery:6/27/2019  Optimization:  Anesthesia Preop Clinic Assessment  Indicated    Medical Opinion Indicated       Sub-specialist consult indicated:   TBD       Pre-habilitation suggested:  JOINT CLASS 6/17      Plan:    Testing:  PT/INR   Pre-anesthesia  visit       Visit focus: possible regional anesthesia and/or nerve block      Consultation:Patient's PCP for a statement of optimization      Patient  has previously scheduled Medical Appointment: 6/21    Navigation: Tests Scheduled.              Consults scheduled.             Results will be tracked by Preop Clinic.

## 2019-06-10 NOTE — ANESTHESIA PREPROCEDURE EVALUATION
Ana Turner RN   Registered Nurse      Pre Admission Screening   Signed                       Anesthesia Assessment: Preoperative EQUATION     Planned Procedure: Procedure(s) (LRB):  ARTHROPLASTY, KNEE, TOTAL-SAME DAY (Right)  Requested Anesthesia Type:Regional  Surgeon: Mikael Huerta MD  Service: Orthopedics  Known or anticipated Date of Surgery:6/27/2019  Optimization:  Anesthesia Preop Clinic Assessment  Indicated    Medical Opinion Indicated                            Sub-specialist consult indicated:   TBD                             Pre-habilitation suggested:  JOINT CLASS 6/17                Plan:    Testing:  PT/INR   Pre-anesthesia  visit                                        Visit focus: possible regional anesthesia and/or nerve block                            Consultation:Patient's PCP for a statement of optimization                            Patient  has previously scheduled Medical Appointment: 6/21     Navigation: Tests Scheduled.                         Consults scheduled.                        Results will be tracked by Preop Clinic.                            Electronically signed by Ana Turner RN at 6/10/2019 10:28 AM                                                                                                      06/10/2019  Donald Warren Abadie is a 63 y.o., male.    Anesthesia Evaluation    I have reviewed the Patient Summary Reports.     I have reviewed the Medications.     Review of Systems  Anesthesia Hx:  No problems with previous Anesthesia  History of prior surgery of interest to airway management or planning: Previous anesthesia: General 5/19/15: hemorrhoidectomy with general anesthesia.  Procedure performed at an [facility]. Denies Family Hx of Anesthesia complications.   Denies Personal Hx of Anesthesia complications.   Social:  Patient's occupation is Retired. Alcohol Use: Pt consumes No alcohol since 3/2019,   Alcohol Abuse: alcohol use is in  recovery   Hematology/Oncology:         -- Anemia: macrocytic --  Cancer in past history:  Oncology Comments: Hx of renal cell cancer: Partial left Nephrectomy 2014     EENT/Dental:   Ears General/Symptom(s) Hearing Impairment: hearing-aid right, hearing-aid left  Denies Throat Symptoms  Denies Jaw Problems   Cardiovascular:   Exercise tolerance: good Dysrhythmias atrial fibrillation Cardiac Arrest 3/29 after severe bradycardia and alcohol binge in addition to Metoprolol + Flecainide overdose.   Hospitalized x 14 days; treated for alcohol withdrawals.    Currently takes Metoprolol on an as needed basis. Functional Capacity low / < 4 METS, limited due to knee pain; denies CP/SOB  Denies Congestive Heart Failure (CHF)  Denies Deep Venous Thrombosis (DVT)  Disorder of Cardiac Rhythm, Atrial Fibrillation, Paroxysmal Atrial Fibrillation    Pulmonary:  Denies Asthma.  Denies Chronic Obstructive Pulmonary Disease (COPD).  Possible Obstructive Sleep Apnea , (STOP/BANG) Symptoms S - Snoring (loud), P - Pressure being treated for high BP, A - Age > 50 and G - Gender (Male > Female)    Hepatic/GI:   GERD Liver Disease,  Esophageal / Stomach Disorders Gerd Controlled by chronic antireflux medication.  Liver Disease, Abnormal Liver Enzymes, Fatty Liver    Musculoskeletal:   Arthritis   Joint Disease:  Arthritis, Osteoarthritis, Gout    Neurological:   Acute metabolic encephalopathy: 3/2019  no Neuro Symptoms Osteoarthritis  Denies Seizure Disorder  Denies CVA - Cerebrovasular Accident  Denies TIA - Transient Ischemic Attack    Endocrine:   Diabetes  Diabetes, Type 2 Diabetes , controlled by diet. , most recent HgA1c value was 6.5 on 4/9/19.  Denies Thyroid Disease  Metabolic Disorders, Hyperlipoproteinemia, hypertriglyceridemia  Psych:   Psychiatric History  Anxiety Disorder.          Physical Exam  General:  Well nourished    Airway/Jaw/Neck:  Airway Findings: Mouth Opening: Normal Tongue: Normal  General Airway Assessment:  Adult  Mallampati: II  TM Distance: Normal, at least 6 cm  Jaw/Neck Findings:  Neck ROM: Normal ROM      Dental:  Dental Findings: In tact   Chest/Lungs:  Chest/Lungs Findings: Clear to auscultation, Normal Respiratory Rate     Heart/Vascular:  Heart Findings: Rate: Bradycardia  Rhythm: Regular Rhythm  Vascular Findings: Normal       Mental Status:  Mental Status Findings:  Cooperative, Alert and Oriented         Anesthesia Plan  Type of Anesthesia, risks & benefits discussed:  Anesthesia Type:  CSE, epidural, general, spinal, regional  Patient's Preference:   Intra-op Monitoring Plan: standard ASA monitors  Intra-op Monitoring Plan Comments:   Post Op Pain Control Plan: multimodal analgesia  Post Op Pain Control Plan Comments:   Induction:   IV  Beta Blocker:         Informed Consent: Patient understands risks and agrees with Anesthesia plan.  Questions answered. Anesthesia consent signed with patient.  ASA Score: 3     Day of Surgery Review of History & Physical:    H&P update referred to the surgeon.     Anesthesia Plan Notes: Eliquis last taken 4 days ago, will proceed with spinal anesthesia        Ready For Surgery From Anesthesia Perspective.   To be cleared per PCP (Laron)- in basket message sent 6/25 for final clearance; pt. seen 6/21/19    Cleared per Cardiology (Rahta) 6/25- please see recommendations.    OK to proceed with surgery per Hepatology (ZULEMA Sibley) 6/25/19    OK to hold Eliquis x 3 days per Dr. Edgar Austin    Pt. was seen by psych 6/21/19    Discharge Plan: To home with daughter (Chani Demuth: [phone])    Farhana Dover RN

## 2019-06-10 NOTE — TELEPHONE ENCOUNTER
----- Message from Ana Turner RN sent at 6/10/2019 10:30 AM CDT -----  NEEDS LAB, POC SURGERY 6/27

## 2019-06-11 ENCOUNTER — OFFICE VISIT (OUTPATIENT)
Dept: PSYCHIATRY | Facility: CLINIC | Age: 64
End: 2019-06-11
Payer: COMMERCIAL

## 2019-06-11 DIAGNOSIS — F41.9 ANXIETY: ICD-10-CM

## 2019-06-11 DIAGNOSIS — F10.21 ALCOHOL USE DISORDER, SEVERE, IN EARLY REMISSION, DEPENDENCE: Primary | ICD-10-CM

## 2019-06-11 PROCEDURE — 90853 PR GROUP PSYCHOTHERAPY: ICD-10-PCS | Mod: S$GLB,,, | Performed by: PSYCHOLOGIST

## 2019-06-11 PROCEDURE — 90853 GROUP PSYCHOTHERAPY: CPT | Mod: S$GLB,,, | Performed by: PSYCHOLOGIST

## 2019-06-12 NOTE — PROGRESS NOTES
Group Psychotherapy    Site: [facility]    Clinical status of patient: Outpatient    6/11/2019    Length of service:[id number]    Referred by: Addictive Behavior Unit     Number of patients in attendance: 3    Target symptoms: alcohol abuse, anxiety     Patient's response to intervention:  The patient's response to intervention is active listening, self-disclosure.    Progress toward goals and other mental status changes:  The patient's progress toward goals is good.    Interval history: Pt discussed his concerns about Afib in the context of his upcoming knee operation. He reports no problems maintaining sobriety.     Diagnosis: Alcohol Use d/o, severe, in early remission.     Plan: group psychotherapy    Return to clinic: as scheduled

## 2019-06-12 NOTE — TELEPHONE ENCOUNTER
appt 6/21 spoke with patient he was driving and ask if his appt sheet could be mailed. Yes mailing it today.

## 2019-06-13 ENCOUNTER — OFFICE VISIT (OUTPATIENT)
Dept: PSYCHIATRY | Facility: CLINIC | Age: 64
End: 2019-06-13
Payer: COMMERCIAL

## 2019-06-13 VITALS
SYSTOLIC BLOOD PRESSURE: 111 MMHG | BODY MASS INDEX: 30.39 KG/M2 | DIASTOLIC BLOOD PRESSURE: 60 MMHG | WEIGHT: 182.63 LBS | HEART RATE: 60 BPM

## 2019-06-13 DIAGNOSIS — F10.21 ALCOHOL USE DISORDER, SEVERE, IN EARLY REMISSION, DEPENDENCE: Primary | ICD-10-CM

## 2019-06-13 DIAGNOSIS — F41.1 GAD (GENERALIZED ANXIETY DISORDER): ICD-10-CM

## 2019-06-13 PROCEDURE — 99999 PR PBB SHADOW E&M-EST. PATIENT-LVL II: ICD-10-PCS | Mod: PBBFAC,,, | Performed by: PSYCHIATRY & NEUROLOGY

## 2019-06-13 PROCEDURE — 99999 PR PBB SHADOW E&M-EST. PATIENT-LVL II: CPT | Mod: PBBFAC,,, | Performed by: PSYCHIATRY & NEUROLOGY

## 2019-06-13 RX ORDER — TRAZODONE HYDROCHLORIDE 50 MG/1
50 TABLET ORAL NIGHTLY
Qty: 30 TABLET | Refills: 3 | Status: SHIPPED | OUTPATIENT
Start: 2019-06-13 | End: 2019-06-25 | Stop reason: SDUPTHER
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
## 2019-06-14 ENCOUNTER — TELEPHONE (OUTPATIENT)
Dept: PREADMISSION TESTING | Facility: HOSPITAL | Age: 64
End: 2019-06-14

## 2019-06-14 ENCOUNTER — TELEPHONE (OUTPATIENT)
Dept: PAIN MEDICINE | Facility: CLINIC | Age: 64
End: 2019-06-14

## 2019-06-14 ENCOUNTER — TELEPHONE (OUTPATIENT)
Dept: ORTHOPEDICS | Facility: CLINIC | Age: 64
End: 2019-06-14

## 2019-06-14 NOTE — TELEPHONE ENCOUNTER
Spoke with pt, notified him his Iovera procedure was not approved but he will still have the surgery. Pt verbalized understanding and had no further questions.    ----- Message from Sharon Anderson RN sent at 6/14/2019 11:23 AM CDT -----  Regarding: RE: NEED MEDICAL NECESSITY AND HEADS UP THIS CASE WILL NEED A PEER TO PEER DUE TO INVESTIGATIONAL   Rossi will call the patient to notify.  ----- Message -----  From: Rossi France MA  Sent: 6/14/2019  11:20 AM  To: Mikael Huerta MD, Sharon Anderson, RN  Subject: RE: NEED MEDICAL NECESSITY AND HEADS UP THIS#    It looks like the Iovera procedure is not approved.    ----- Message -----  From: Mikael Huerta MD  Sent: 6/14/2019  11:10 AM  To: Sharon Anderson RN, Rossi France MA  Subject: RE: NEED MEDICAL NECESSITY AND HEADS UP THIS#    I am confused.  What is not approved?  The surgical case or the Iovera?  ----- Message -----  From: Rossi France MA  Sent: 6/14/2019  10:23 AM  To: Mikael Huerta MD, Sharon Anderson RN  Subject: FW: NEED MEDICAL NECESSITY AND HEADS UP THIS#    Iovera is pending too...    ----- Message -----  From: Ingrid Lambert LPN  Sent: 6/14/2019  10:19 AM  To: [employer]  Subject: FW: NEED MEDICAL NECESSITY AND HEADS UP THIS#        ----- Message -----  From: Jim Alex  Sent: 6/14/2019  10:05 AM  To: Donny Hair III, MD, #  Subject: FW: NEED MEDICAL NECESSITY AND HEADS UP THIS#    JUST A HEADS UP THIS CASE IS STILL PENDING AND NOT YET APPROVED.    THANKS SO MUCH FOR YOUR TIME  [facility]   JIM ALEX  [phone]      ----- Message -----  From: Jim Alex  Sent: 6/13/2019  12:52 PM  To: Donny Hair III, MD, #  Subject: NEED MEDICAL NECESSITY AND HEADS UP THIS JOCELIN#    GOOD AFTERNOON ,    I NEED TO DETERMINE THE MEDICAL NECESSITY  OF THIS CASE AND TO GIVE HEADS UP THIS CASE WILL NEED A PEER TO PEER DUE TO REVIEW OF INVESTIGATIONAL    CASE INFO  TO CALL NURSE WHEN PEER TO PEER MESSAGE IS RECEIVED TOMORROW  BC DOS 06/17/2019 Monday     CASE REF# [id number]  NURSE NAYAN BLANK [phone]    THANKS SO MUCH FOR YOUR TIME  [facility]   JIM ALEX  [phone]

## 2019-06-14 NOTE — TELEPHONE ENCOUNTER
Dr. Hair,     I know you don't accept self pay patients due to the cost of the procedures. What can we do for this patient since he was on the schedule already?    Please advise

## 2019-06-14 NOTE — TELEPHONE ENCOUNTER
----- Message from Merary Cruz sent at 6/14/2019  3:03 PM CDT -----  Contact: Patient  Good Afternoon,    Patient called requesting and estimate for injection that was scheduled for 6/17/19. Injection was cancelled. Patient stated insurance company denied injection. Patient would like a self-pay estimate. Could you please provide the CPT or Procedures for the injection the patient was suppose to have on 6/17/19 in order for our office to quote them a self-pay estimate.    Thank you,  [facility]  [phone]

## 2019-06-14 NOTE — TELEPHONE ENCOUNTER
Hello Provider,       Your patient indicated above requires pre-op optimization for a ARTHROPLASTY, KNEE, TOTAL-SAME DAY  with Dr. Huerta on  6/27/2019.  They were last seen in your office on 6/26/2019.      Please indicated in a note in the EMR, if the patient is ok to proceed.      If not, please advise timely.    Riya  [id number]

## 2019-06-17 ENCOUNTER — HOSPITAL ENCOUNTER (OUTPATIENT)
Facility: HOSPITAL | Age: 64
Discharge: HOME | End: 2019-06-17
Attending: ANESTHESIOLOGY | Admitting: ANESTHESIOLOGY

## 2019-06-17 ENCOUNTER — TELEPHONE (OUTPATIENT)
Dept: ORTHOPEDICS | Facility: CLINIC | Age: 64
End: 2019-06-17

## 2019-06-17 VITALS
TEMPERATURE: 97 F | RESPIRATION RATE: 20 BRPM | OXYGEN SATURATION: 100 % | HEART RATE: 53 BPM | DIASTOLIC BLOOD PRESSURE: 64 MMHG | SYSTOLIC BLOOD PRESSURE: 128 MMHG | HEIGHT: 66 IN | WEIGHT: 180 LBS | BODY MASS INDEX: 28.93 KG/M2

## 2019-06-17 DIAGNOSIS — G89.29 CHRONIC PAIN OF RIGHT KNEE: ICD-10-CM

## 2019-06-17 DIAGNOSIS — M25.561 CHRONIC PAIN OF RIGHT KNEE: ICD-10-CM

## 2019-06-17 PROCEDURE — 64640 INJECTION TREATMENT OF NERVE: CPT | Mod: RT,,, | Performed by: ANESTHESIOLOGY

## 2019-06-17 PROCEDURE — 64640 INJECTION TREATMENT OF NERVE: CPT | Mod: RT | Performed by: ANESTHESIOLOGY

## 2019-06-17 PROCEDURE — 25000003 PHARM REV CODE 250: Performed by: ANESTHESIOLOGY

## 2019-06-17 PROCEDURE — C2618 PROBE/NEEDLE, CRYO: HCPCS | Performed by: ANESTHESIOLOGY

## 2019-06-17 PROCEDURE — 64640 PR DESTRUCT BY NEURO AGENT; OTHER PERIPH NERVE: ICD-10-PCS | Mod: RT,,, | Performed by: ANESTHESIOLOGY

## 2019-06-17 RX ORDER — LIDOCAINE HYDROCHLORIDE 20 MG/ML
INJECTION, SOLUTION EPIDURAL; INFILTRATION; INTRACAUDAL; PERINEURAL
Status: DISCONTINUED | OUTPATIENT
Start: 2019-06-17 | End: 2019-06-17 | Stop reason: HOSPADM

## 2019-06-17 NOTE — DISCHARGE SUMMARY
Discharge Note  Short Stay      SUMMARY     Admit Date: 6/17/2019    Attending Physician: Donny Hair III      Discharge Physician: Donny Hair III      Discharge Date: 6/17/2019 9:31 AM    Final Diagnosis:   1. Chronic pain of right knee        Disposition: Home or self care    Patient Instructions:   Current Discharge Medication List      CONTINUE these medications which have NOT CHANGED    Details   allopurinol (ZYLOPRIM) 100 MG tablet TAKE 2 TABLETS BY MOUTH DAILY  Qty: 60 tablet, Refills: 0      apixaban (ELIQUIS) 5 mg Tab Take 1 tablet (5 mg total) by mouth 2 (two) times daily.  Qty: 60 tablet, Refills: 11    Associated Diagnoses: Paroxysmal atrial fibrillation      cyanocobalamin (VITAMIN B-12) 1000 MCG tablet Take 1 tablet (1,000 mcg total) by mouth once daily.      fenofibrate 160 MG Tab Take 1 tablet (160 mg total) by mouth every evening.  Qty: 90 tablet, Refills: 3      flecainide (TAMBOCOR) 150 MG Tab Take 1 tablet (150 mg total) by mouth 2 (two) times daily.  Qty: 60 tablet, Refills: 11    Associated Diagnoses: Paroxysmal atrial fibrillation      folic acid (FOLVITE) 1 MG tablet Take 1 tablet (1 mg total) by mouth once daily.  Qty: 30 tablet, Refills: 11      gabapentin (NEURONTIN) 300 MG capsule Take 1 capsule (300 mg total) by mouth every evening.  Qty: 30 capsule, Refills: 2      metoprolol tartrate (LOPRESSOR) 25 MG tablet Take 0.5 tablets (12.5 mg total) by mouth 2 (two) times daily.  Qty: 30 tablet, Refills: 11      multivitamin (THERAGRAN) tablet Take 1 tablet by mouth once daily.      omega-3 acid ethyl esters (LOVAZA) 1 gram capsule Take 2 g by mouth 2 (two) times daily.      ranitidine (ZANTAC) 150 MG capsule Take 150 mg by mouth 2 (two) times daily.      rosuvastatin (CRESTOR) 10 MG tablet Take 1 tablet (10 mg total) by mouth once daily.  Qty: 90 tablet, Refills: 3    Associated Diagnoses: Hypertriglyceridemia      thiamine 100 MG tablet Take 1 tablet (100 mg total) by  mouth once daily.      !! traZODone (DESYREL) 50 MG tablet Take 1-2 tablets ( mg total) by mouth every evening.  Qty: 60 tablet, Refills: 1      aspirin 81 MG Chew Take 1 tablet (81 mg total) by mouth once daily.  Qty: 30 tablet, Refills: 11    Associated Diagnoses: Hyperlipidemia; Alcohol abuse; Elevated LFTs      pantoprazole (PROTONIX) 40 MG tablet Take 1 tablet (40 mg total) by mouth once daily.  Qty: 30 tablet, Refills: 11      !! traZODone (DESYREL) 50 MG tablet Take 1 tablet (50 mg total) by mouth every evening.  Qty: 30 tablet, Refills: 3       !! - Potential duplicate medications found. Please discuss with provider.              Discharge Diagnosis: Same as Pre and Post Procedure  Condition on Discharge: Stable.  Diet on Discharge: Same as before.  Activity: as per instruction sheet.  Discharge to: Home with a responsible adult.  Follow up: as per Discharge instructions.

## 2019-06-17 NOTE — PLAN OF CARE
Problem: Pain Chronic (Persistent)  Goal: Acceptable Pain Control and Functional Ability  Outcome: Ongoing (interventions implemented as appropriate)  Admit assessment done. Plan of care and safety prec initiated. Will continue to monitor.

## 2019-06-17 NOTE — PLAN OF CARE
Received report from MARLEEN Huang. Patient s/p pain injection, AAOx3. VSS, no c/o pain or discomfort at this time, resp even and unlabored. Bandaid x 6 to R knee are CDI. No active bleeding. No hematoma noted. Post procedure protocol reviewed with patient and patient's family. Understanding verbalized. Family members at bedside. Nurse call bell within reach. Will continue to monitor per post procedure protocol.

## 2019-06-17 NOTE — DISCHARGE INSTRUCTIONS
Home Care Instructions Pain Management:    1. DIET:   You may resume your normal diet today.   2. BATHING:   You may shower with luke warm water.  3. DRESSING:   You may remove your bandage today.   4. ACTIVITY LEVEL:   You may resume your normal activities today. No strenuous activity for 24 hours after your procedure.   5. MEDICATIONS:   You may resume your normal medications today.   6. SPECIAL INSTRUCTIONS:   You may bath or shower this evening.    Use ice pack to injection site for any pain or discomfort.  Apply ice packs for 20 minute intervals as needed.     PLEASE CALL YOUR DOCTOR IF:  1. Redness or swelling around the injection site.  2. Fever of 101 degrees  3. Drainage (pus) from the injection site.  4. For any continuous bleeding (some dried blood over the incision is normal.)    FOR EMERGENCIES:   If any unusual problems or difficulties occur during clinic hours, call [phone] or [phone].

## 2019-06-17 NOTE — OP NOTE
Procedure Note Iovera:    DATE OF PROCEDURE:  6/17/2019    PREOPERATIVE DIAGNOSIS: right knee osteoarthritis.     POSTOPERATIVE DIAGNOSIS: right knee osteoarthritis.     PROCEDURE: Iovera treatment of anterior femoral cutaneous nerve, and both branches of infrapatellar saphenous nerve using at least 3 different punctures to treat all 3 nerves. (cpt 64640 x3)    ATTENDING SURGEON: Donny Hair III, MD    Interventional Pain Management    ASSISTANT: none    COMPLICATIONS: None.     IMPLANTS:  None    ESTIMATED BLOOD LOSS:  < 5cc    SPECIMENS REMOVED:  None    ANESTHESIA: Local lidocaine 2%    INDICATIONS FOR PROCEDURE: This is a 63 y.o. male with longstanding knee pain. They have failed non operative management including injections.I discussed a new treatment therapy called Iovera, which is cryotherapy, to provide symptomatic relief along the sensory distribution of the infrapatellar tendon branch of the saphenous nerve  and AFCN. The patient elected to move forward with the procedure. The patient was given patient information and literature to review prior to the procedure as well.  Based on this, the patient agreed to move forward with doing the procedure.      PROCEDURE:    The patient was placed supine on the exam table and the proximal medial aspect of the right tibia and anterior aspect of distal femur was prepped with Chlorhexidine.  A line was drawn extending approximately 5 cm medial to inferior pole of the patella distally to a point approximately 5 cm medial to the tibial tubercle.  A second line was drawn in a medial to lateral direction the width of the patella approximately 7 cm proximal to the patella. We then infiltrated the skin with lidocaine 2% along both lines using a 25g needle. We then introduced the Iovera device along these lines and this device penetrated the skin, creating cryotherapy to both branches of the infrapatellar saphenous nerve and a third treatment to the anterior femoral  cutaneous nerve. 7 punctures of the skin were made to treat the 2 branches of the ISN and another 7 punctures were made to treat the AFCN. There were a total of 3 nerves treated with iovera. The patient tolerated the procedure well with no problem

## 2019-06-17 NOTE — TELEPHONE ENCOUNTER
Spoke to daughter, rescheduled pre-op appointment after pt sees PCP per Dr. Huerta request. Understanding verbalized.

## 2019-06-18 ENCOUNTER — OFFICE VISIT (OUTPATIENT)
Dept: PSYCHIATRY | Facility: CLINIC | Age: 64
End: 2019-06-18
Payer: COMMERCIAL

## 2019-06-18 DIAGNOSIS — F10.21 ALCOHOL USE DISORDER, SEVERE, IN EARLY REMISSION, DEPENDENCE: Primary | ICD-10-CM

## 2019-06-18 DIAGNOSIS — F41.9 ANXIETY: ICD-10-CM

## 2019-06-18 PROCEDURE — 90853 PR GROUP PSYCHOTHERAPY: ICD-10-PCS | Mod: S$GLB,,, | Performed by: PSYCHOLOGIST

## 2019-06-18 PROCEDURE — 90853 GROUP PSYCHOTHERAPY: CPT | Mod: S$GLB,,, | Performed by: PSYCHOLOGIST

## 2019-06-19 NOTE — PROGRESS NOTES
Group Psychotherapy    Site: [facility]    Clinical status of patient: Outpatient    6/18/2019    Length of service:[id number]    Referred by: Addictive Behavior Unit     Number of patients in attendance: 4    Target symptoms: alcohol abuse, anxiety     Patient's response to intervention:  The patient's response to intervention is active listening, self-disclosure.    Progress toward goals and other mental status changes:  The patient's progress toward goals is good.    Interval history: Pt continues to be worried about the possibility of Afib in the context of his upcoming knee operation. Pt may consider postponing knee operation until his concerns about Afib re allayed by consulting with his cardiologist.      Diagnosis: Alcohol Use d/o, severe, in early remission.     Plan: group psychotherapy    Return to clinic: as scheduled

## 2019-06-20 NOTE — PROGRESS NOTES
STAFF COMMENTS: I have discussed pt with Dr. Sevilla and reviewed the history and exam. I agree and concur with the assessment and plan.

## 2019-06-21 ENCOUNTER — TELEPHONE (OUTPATIENT)
Dept: FAMILY MEDICINE | Facility: CLINIC | Age: 64
End: 2019-06-21

## 2019-06-21 ENCOUNTER — HOSPITAL ENCOUNTER (OUTPATIENT)
Dept: PREADMISSION TESTING | Facility: HOSPITAL | Age: 64
Discharge: HOME | End: 2019-06-21
Attending: INTERNAL MEDICINE
Payer: COMMERCIAL

## 2019-06-21 ENCOUNTER — TELEPHONE (OUTPATIENT)
Dept: PREADMISSION TESTING | Facility: HOSPITAL | Age: 64
End: 2019-06-21

## 2019-06-21 ENCOUNTER — OFFICE VISIT (OUTPATIENT)
Dept: PSYCHIATRY | Facility: CLINIC | Age: 64
End: 2019-06-21
Payer: COMMERCIAL

## 2019-06-21 ENCOUNTER — OFFICE VISIT (OUTPATIENT)
Dept: INTERNAL MEDICINE | Facility: CLINIC | Age: 64
End: 2019-06-21
Payer: COMMERCIAL

## 2019-06-21 VITALS
TEMPERATURE: 98 F | OXYGEN SATURATION: 98 % | HEART RATE: 51 BPM | DIASTOLIC BLOOD PRESSURE: 60 MMHG | HEIGHT: 66 IN | SYSTOLIC BLOOD PRESSURE: 104 MMHG | BODY MASS INDEX: 28.63 KG/M2 | WEIGHT: 178.13 LBS

## 2019-06-21 VITALS
RESPIRATION RATE: 16 BRPM | WEIGHT: 179 LBS | SYSTOLIC BLOOD PRESSURE: 114 MMHG | TEMPERATURE: 98 F | BODY MASS INDEX: 28.77 KG/M2 | DIASTOLIC BLOOD PRESSURE: 63 MMHG | OXYGEN SATURATION: 99 % | HEART RATE: 54 BPM | HEIGHT: 66 IN

## 2019-06-21 DIAGNOSIS — E11.9 DIABETES MELLITUS TYPE 2, NONINSULIN DEPENDENT: ICD-10-CM

## 2019-06-21 DIAGNOSIS — E78.5 HYPERLIPIDEMIA, UNSPECIFIED HYPERLIPIDEMIA TYPE: ICD-10-CM

## 2019-06-21 DIAGNOSIS — Z01.818 PRE-OP EVALUATION: Primary | ICD-10-CM

## 2019-06-21 DIAGNOSIS — I48.91 ATRIAL FIBRILLATION, UNSPECIFIED TYPE: ICD-10-CM

## 2019-06-21 DIAGNOSIS — F41.1 GAD (GENERALIZED ANXIETY DISORDER): ICD-10-CM

## 2019-06-21 DIAGNOSIS — F10.21 ALCOHOL USE DISORDER, SEVERE, IN EARLY REMISSION, DEPENDENCE: Primary | ICD-10-CM

## 2019-06-21 DIAGNOSIS — Z85.528 H/O RENAL CELL CANCER: ICD-10-CM

## 2019-06-21 PROCEDURE — 3045F PR MOST RECENT HEMOGLOBIN A1C LEVEL 7.0-9.0%: ICD-10-PCS | Mod: CPTII,S$GLB,, | Performed by: INTERNAL MEDICINE

## 2019-06-21 PROCEDURE — 3078F PR MOST RECENT DIASTOLIC BLOOD PRESSURE < 80 MM HG: ICD-10-PCS | Mod: CPTII,S$GLB,, | Performed by: INTERNAL MEDICINE

## 2019-06-21 PROCEDURE — 3074F SYST BP LT 130 MM HG: CPT | Mod: CPTII,S$GLB,, | Performed by: INTERNAL MEDICINE

## 2019-06-21 PROCEDURE — 3045F PR MOST RECENT HEMOGLOBIN A1C LEVEL 7.0-9.0%: CPT | Mod: CPTII,S$GLB,, | Performed by: INTERNAL MEDICINE

## 2019-06-21 PROCEDURE — 3074F PR MOST RECENT SYSTOLIC BLOOD PRESSURE < 130 MM HG: ICD-10-PCS | Mod: CPTII,S$GLB,, | Performed by: INTERNAL MEDICINE

## 2019-06-21 PROCEDURE — 3078F DIAST BP <80 MM HG: CPT | Mod: CPTII,S$GLB,, | Performed by: INTERNAL MEDICINE

## 2019-06-21 PROCEDURE — 3078F DIAST BP <80 MM HG: CPT | Mod: CPTII,S$GLB,, | Performed by: PSYCHIATRY & NEUROLOGY

## 2019-06-21 PROCEDURE — 99999 PR PBB SHADOW E&M-EST. PATIENT-LVL III: ICD-10-PCS | Mod: PBBFAC,,, | Performed by: INTERNAL MEDICINE

## 2019-06-21 PROCEDURE — 3078F PR MOST RECENT DIASTOLIC BLOOD PRESSURE < 80 MM HG: ICD-10-PCS | Mod: CPTII,S$GLB,, | Performed by: PSYCHIATRY & NEUROLOGY

## 2019-06-21 PROCEDURE — 3008F PR BODY MASS INDEX (BMI) DOCUMENTED: ICD-10-PCS | Mod: CPTII,S$GLB,, | Performed by: INTERNAL MEDICINE

## 2019-06-21 PROCEDURE — 99215 OFFICE O/P EST HI 40 MIN: CPT | Mod: S$GLB,,, | Performed by: INTERNAL MEDICINE

## 2019-06-21 PROCEDURE — 99215 PR OFFICE/OUTPT VISIT, EST, LEVL V, 40-54 MIN: ICD-10-PCS | Mod: S$GLB,,, | Performed by: INTERNAL MEDICINE

## 2019-06-21 PROCEDURE — 3008F BODY MASS INDEX DOCD: CPT | Mod: CPTII,S$GLB,, | Performed by: INTERNAL MEDICINE

## 2019-06-21 PROCEDURE — 99211 PR OFFICE/OUTPT VISIT, EST, LEVL I: ICD-10-PCS | Mod: S$GLB,,, | Performed by: PSYCHIATRY & NEUROLOGY

## 2019-06-21 PROCEDURE — 3074F PR MOST RECENT SYSTOLIC BLOOD PRESSURE < 130 MM HG: ICD-10-PCS | Mod: CPTII,S$GLB,, | Performed by: PSYCHIATRY & NEUROLOGY

## 2019-06-21 PROCEDURE — 3074F SYST BP LT 130 MM HG: CPT | Mod: CPTII,S$GLB,, | Performed by: PSYCHIATRY & NEUROLOGY

## 2019-06-21 PROCEDURE — 99999 PR PBB SHADOW E&M-EST. PATIENT-LVL III: CPT | Mod: PBBFAC,,, | Performed by: INTERNAL MEDICINE

## 2019-06-21 PROCEDURE — 99211 OFF/OP EST MAY X REQ PHY/QHP: CPT | Mod: S$GLB,,, | Performed by: PSYCHIATRY & NEUROLOGY

## 2019-06-21 RX ORDER — DOXEPIN HYDROCHLORIDE 10 MG/1
10 CAPSULE ORAL NIGHTLY
Qty: 30 CAPSULE | Refills: 1 | Status: SHIPPED | OUTPATIENT
Start: 2019-06-21 | End: 2019-10-16 | Stop reason: SDDI

## 2019-06-21 NOTE — PROGRESS NOTES
Outpatient Psychiatry Follow-Up Visit (MD/NP)    6/21/2019    Clinical Status of Patient:  Outpatient (Ambulatory)    Chief Complaint:  Donald Warren Abadie is a 63 y.o. male who presents today for follow-up of anxiety and substance problems.  Met with patient.      Interval History and Content of Current Session:  Interim Events/Subjective Report/Content of Current Session: Patient reports that since he stopped taking Gabapentin his hair has stopped falling out. He reports that he has been having issues with Trazodone 25 mg PO daily. He has been able to sleep with the medication however it makes him too tired in the night. He report that he has been eating well. He reports that his main issue is not being able to sleep and feeling anxious. He has continued to stay sober. His house is still being worked on and he states that things are continuing to progress. He plans on having his knee surgery this upcoming Thursday. We discussed different options of medications to assist with sleep and anxiety. Patient was interested in trying Lyrica and states that he also has issues with restless leg at night. He was unsure however if his insurance would cover the medication. He was however willing to try Doxepin for the first time. He denies any SI/HI/AVH.    Psychotherapy:  · Target symptoms: alcohol abuse, anxiety   · Why chosen therapy is appropriate versus another modality: relevant to diagnosis  · Outcome monitoring methods: self-report  · Therapeutic intervention type: supportive psychotherapy  · Topics discussed/themes: stress related to medical comorbidities  · The patient's response to the intervention is accepting. The patient's progress toward treatment goals is good.   · Duration of intervention: 2 minutes.    Review of Systems   · PSYCHIATRIC: Pertinant items are noted in the narrative.    Past Medical, Family and Social History: The patient's past medical, family and social history have been reviewed and updated  as appropriate within the electronic medical record - see encounter notes.    Compliance: no    Side effects: somnolence    Risk Parameters:  Patient reports no suicidal ideation  Patient reports no homicidal ideation  Patient reports no self-injurious behavior  Patient reports no violent behavior    Exam (detailed: at least 9 elements; comprehensive: all 15 elements)   Constitutional  Vitals:  Most recent vital signs, dated less than 90 days prior to this appointment, were reviewed.   There were no vitals filed for this visit.     General:  unremarkable, age appropriate     Musculoskeletal  Muscle Strength/Tone:  not examined   Gait & Station:  non-ataxic     Psychiatric  Speech:  no latency; no press   Mood & Affect:  anxious  congruent and appropriate   Thought Process:  normal and logical   Associations:  intact   Thought Content:  normal, no suicidality, no homicidality, delusions, or paranoia   Insight:  intact   Judgement: behavior is adequate to circumstances   Orientation:  grossly intact   Memory: intact for content of interview   Language: grossly intact   Attention Span & Concentration:  able to focus   Fund of Knowledge:  intact and appropriate to age and level of education     Assessment and Diagnosis   Status/Progress: Based on the examination today, the patient's problem(s) is/are inadequately controlled.  New problems have been presented today.   Co-morbidities, Diagnostic uncertainty and Lack of compliance are not complicating management of the primary condition.  There are no active rule-out diagnoses for this patient at this time.     General Impression:       ICD-10-CM ICD-9-CM   1. Alcohol use disorder, severe, in early remission, dependence F10.21 303.93   2. DOLORES (generalized anxiety disorder) F41.1 300.02       Intervention/Counseling/Treatment Plan   Treatment Plan/Recommendations:   · Medication Management: The risks and benefits of medication were discussed with the patient.    · Discontinue Gabapentin due to hair loss  · Discontinue Trazodone 25 mg PO QHS  · Initiate Doxepin 10 mg PO QHS  · Would attempt to try patient on Pregabalin for restless leg/anxiety however it is unlikely insurance will cover this medication for anxiety  · Would prefer not to use benzodiazepines or benzodiazepine like medications due to history of alcohol abuse  · Labs: reviewed most recent  · The treatment plan and follow up plan were reviewed with the patient.  · Discussed with patient informed consent, risks vs. benefits, alternative treatments, side effect profile and the inherent unpredictability of individual responses to these treatments. The patient expresses understanding of the above and displays the capacity to agree with this current plan and had no other questions.  · Encouraged Patient to keep future appointments.   · Take medications as prescribed and abstain from substance abuse.   · In the event of an emergency patient was advised to go to the emergency room.      Return to Clinic: 6 weeks

## 2019-06-21 NOTE — TELEPHONE ENCOUNTER
----- Message from Sheila Diane MD sent at 6/21/2019 10:51 AM CDT -----      ----- Message -----  From: Shashank Chowdhury MD  Sent: 6/21/2019   9:47 AM  To: Mikael Huerta MD, Sheila Diane MD    I do not know the patient but I did speak with the Addictions staff who was involved in his treatment.  He has a strong support system and completed the program successfully.   They advocated for him to get the surgery.  From what I know I don't believe he is at great risk of relapse.    ----- Message -----  From: Sheila Diane MD  Sent: 6/21/2019   9:16 AM  To: Mikael Huerta MD, Shashank Chowdhury MD    This is Sheila Diane, one of the anesthesiologists in the pre-op center.  This patient is coming through our clinic today for evaluation for his elective total knee replacement next week.  I saw that he came to your clinic last week as a new patient in hopes of maintaining sobriety, and we are concerned about whether this is an appropriate time to have this surgery.  I am cc'ing his surgeon, Dr Huerta so they are in the know as well.  Thank you!

## 2019-06-21 NOTE — TELEPHONE ENCOUNTER
----- Message from Charis Kay MA sent at 6/21/2019  4:22 PM CDT -----  Regarding: FW: Eliquis instructions      ----- Message -----  From: Natalie Austin MD  Sent: 6/21/2019   4:17 PM  To: Charis Kay MA  Subject: RE: Eliquis instructions                         Yes it is acceptable to hold Eliquis for 3 days if that is what is required. He has a low CHADSVASC of 1 and low stroke risk.    TBM  ----- Message -----  From: Charis Kay MA  Sent: 6/21/2019   3:50 PM  To: Natalie Austin MD  Subject: FW: Eliquis instructions                             ----- Message -----  From: Farhana Dover RN  Sent: 6/21/2019   3:16 PM  To: Edgar [facility]strada Staff  Subject: Eliquis instructions                             Mr. Abadie is scheduled for right knee replacement with Dr. Huerta on Thursday 6/27. We are looking for Apixaban instructions before surgery. For neuraxial procedures, anesthesia would like at least a 3 day hold. Will this acceptable for Dr. Edgar Austin?   Pt. has a cards clearance (Dr. Giang) on Tuesday 6/25. He will not give Eliquis instructions before the appt. due to not seeing patient in a year.  They suggested reaching out to General Cardiology instead.   We will need directions as soon as possible in order to proceed with surgery.    Thanks,    Farhana Dover RN   Pre Center   Ext. [phone]

## 2019-06-21 NOTE — DISCHARGE INSTRUCTIONS
Your surgery has been scheduled for:__________________________________________    You should report to:  ____[facility], located on the [place] side of the first floor of the           [facility] ([phone])  ____[facility], located on the [facility] side of the            Second floor of the [facility] ([phone])  ____3rd Floor [facility] located on the [facility] side of the [facility] [phone]  Please Note   - Tell your doctor if you take Aspirin, products containing Aspirin, herbal medications  or blood thinners, such as Coumadin, Ticlid, or Plavix.  (Consult your provider regarding holding or stopping before surgery).  - Arrange for someone to drive you home following surgery.  You will not be allowed to leave the surgical facility alone or drive yourself home following sedation and anesthesia.  Before Surgery  - Stop taking all herbal medications 14days prior to surgery  - No Motrin/Advil (Ibuprofen) 7 days before surgery  - No Aleve (Naproxen) 7 days before surgery  - Stop Taking Asprin, products containing Asprin _____days before surgery  - Stop taking blood thinners_______days before surgery  - No Goody's/BC  Powder 7 days before surgery  - Refrain from drinking alcoholic beverages for 24hours before and after surgery  - Stop or limit smoking _________days before surgery  - You may take Tylenol for pain  Night before Surgery  - Do not eat or drink after midnight  - Take a shower or bath (shower is recommended).  Bathe with Hibiclens soap or an antibacterial soap from the neck down.  If not supplied by your surgeon, hibiclens soap will need to be purchased over the counter in pharmacy.  Rinse soap off thoroughly.  - Shampoo your hair with your regular shampoo  The Day of Surgery  - Take another bath or shower with hibiclens or any antibacterial soap, to reduce the chance of infection.  - Take heart  and blood pressure medications with a small sip of water, as advised by the perioperative team.  - Do not take fluid pills  - You may brush your teeth and rinse your mouth, but do not swall any additional water.   - Do not apply perfumes, powder, body lotions or deodorant on the day of surgery.  - Nail polish should be removed.  - Do not wear makeup or moisturizer  - Wear comfortable clothes, such as a button front shirt and loose fitting pants.  - Leave all jewelry, including body piercings, and valuables at home.    - Bring any devices you will neeed after surgery such as crutches or canes.  - If you have sleep apnea, please bring your CPAP machine  In the event that your physical condition changes including the onset of a cold or respiratory illness, or if you have to delay or cancel your surgery, please notify your surgeon.   Anesthesia: Regional Anesthesia    Youre scheduled for surgery. During surgery, youll receive medicine called anesthesia to keep you comfortable and pain-free. Your surgeon has decided that youll receive regional anesthesia. This sheet tells you what to expect with this type of anesthesia.  What is regional anesthesia?  Regional anesthesia numbs one region of your body. The anesthesia may be given around nerves or into veins in your arms, neck, or legs (nerve block or Kimberly block). Or it may be sent into the spinal fluid (spinal anesthesia) or into the space just outside the spinal fluid (epidural anesthesia). You may also be given sedatives to help you relax.  Nerve block or Pierron block  A small area of the body, such as an arm or leg, can be numbed using a nerve block or Pierron block.  · Nerve block. During a nerve block, your skin is numbed. A needle is then inserted near nerves that serve the area to be numbed. Anesthetic is sent through the needle.  · IV regional or Pierron block. For this type of block, an IV line is put into a vein. The blood flow to the area to be numbed is blocked for  a short time. Anesthetic is sent through the IV.  Spinal anesthesia  Spinal anesthesia numbs your body from about the waist down.  · Anesthetic is injected into the spinal fluid. This is a substance that surrounds the spinal cord in your spinal column. The anesthetic blocks pain traveling from the body to the brain.  · To receive the anesthetic, your skin is numbed at the injection site on your back.  · A needle is then inserted into the spinal space. Anesthetic is sent into the spinal fluid through the needle.  Epidural anesthesia  Epidural anesthesia is most commonly used during childbirth and may also be used after surgical procedures of the chest, belly, and legs.  · Anesthetic is injected into the epidural space. This is just outside the dural sac which contains the spinal fluid.  · To receive the anesthetic, your skin is numbed at the injection site on your back.  · A needle is then inserted into the epidural space. Anesthetic is sent into the epidural space through the needle.  · A small flexible catheter may be attached to the needle and left in place. This allows for continuous injections or infusions of anesthetic.  Anesthesia tools and medicines that might be near you during your procedure  · Local anesthetic. This medicine is given through a needle numbs one region of your body.  · Electrocardiography leads (electrodes). These are used to record your heart rate and rhythm.  · Blood pressure cuff. A cuff is placed on your arm to keep track of your blood pressure.  · Pulse oximeter. This small clip is placed on the end of the finger. It measures your blood oxygen level.  · Sedatives. These medicines may be given through an IV. They help to relax you and keep you comfortable. You may stay awake or sleep lightly.  · Oxygen. You may be given oxygen through a facemask.  Risks and possible complications  Regional anesthesia carries some risks. These include:  · Nausea and  vomiting  · Headache  · Backache  · Decreased blood pressure  · Allergic reaction to the anesthetic  · Ongoing numbness (rare)  · Irregular heartbeat (rare)  · Cardiac arrest (rare)   Date Last Reviewed: 12/1/2016  © 6808-2506 [employer]. [address] [place] [address]. All rights reserved. This information is not intended as a substitute for professional medical care. Always follow your healthcare professional's instructions.

## 2019-06-22 NOTE — PROGRESS NOTES
CC:  Preoperative clearance.    HPI:  The patient is a 63-year-old gentleman who presents today for preoperative clearance.  He has a history of alcohol abuse and is currently alcohol free past 3 months.  He is in treatment and is being followed by Psychiatry.  He is being treated for atrial fibrillation with flecainide and has a follow-up with Dr. Giang next week.  He has a history of renal cell cancer status post left partial nephrectomy.  He is currently on allopurinol for gout which is stable, with no recent flare-ups.  He has diet-controlled diabetes.  The patient states that when he was placed on Remeron his appetite increased, his weight increased and his sugars increased.  Now that he is off her Remeron, he reports his blood sugars are getting back where they have been and his weight has gone down as well.  He is on rosuvastatin for cholesterol.    ROS:  The patient does report weight loss since stopping Remeron.  No visual changes.  He does have hearing loss and requires hearing aids.  No trouble swallowing.  No chest pain.  No shortness of breath.  He does report having episodes where it feels like his heart beating fast.  He is scared of his beta-blocker because of his accidental overdose with his beta-blocker.  No problems with nausea or vomiting.  No abdominal pain.  No black tarry stools.  No trouble urinating.  No weakness in the arms or legs.  He does report significant hair loss once he started gabapentin.  He stopped this medication and the hair loss subsided.  He does check his blood sugars every day.  They have been ranging now between 120 and 130.  Prior to that when he was on Remeron, his blood sugars around 200.    Physical exam:  HEENT:  General appearance no acute distress. Conjunctiva is clear.  Pupils are equal and reactive.  He does have bilateral cataracts.  His ear canals are very small and is difficult to evaluate his TMs.  Nasal septum is midline without discharge. Oropharynx showed  moist mucous membranes.  Trachea was midline without thyromegaly.  And without JVD.  Pulmonary:  Good inspiratory, expiratory breath sounds are heard.  Lungs are clear to auscultation.  Cardiovascular:  S1-S2, rhythm appears to be regular at this time.  2+ carotid pulses without bruits.  Extremities:  Without edema.  GI:  Nontender, nondistended without hepatosplenomegaly.    Assessment:  1.  Preop evaluation for knee replacement  2.  Atrial fibrillation  3.  History of alcohol abuse currently off of alcohol  4.  Non insulin-dependent diabetes currently on diet alone  5.  History of renal cell cancer status post partial nephrectomy  6.  Hyperlipidemia.    Plan:  1.  Will schedule a CBC, CMP, A1c.  2.  Will clear patient pending results.  3.  The patient does have a follow-up appointment scheduled for Tuesday of next week with Cardiology for his AFib.    Addendum: The patient's labs and EKG were reviewed. His A1C increased from 6.5 to 7.9, which he attributes to Remeron stimulating his diet. He states his blood sugars have improved. He is on no medication. He will need to have his blood sugars monitored. I would recommend consulting Endocrinology or Medicine for diabetes management if blood sugars become a problem. He is otherwise maximally managed from a Medicine stand point and is cleared for surgery and anesthesia.

## 2019-06-24 ENCOUNTER — OFFICE VISIT (OUTPATIENT)
Dept: ORTHOPEDICS | Facility: CLINIC | Age: 64
End: 2019-06-24
Payer: COMMERCIAL

## 2019-06-24 ENCOUNTER — HOSPITAL ENCOUNTER (OUTPATIENT)
Dept: RADIOLOGY | Facility: HOSPITAL | Age: 64
Discharge: HOME | DRG: 470 | End: 2019-06-24
Attending: NURSE PRACTITIONER
Payer: COMMERCIAL

## 2019-06-24 VITALS — BODY MASS INDEX: 28.57 KG/M2 | WEIGHT: 177.81 LBS | HEIGHT: 66 IN

## 2019-06-24 DIAGNOSIS — M17.11 PRIMARY OSTEOARTHRITIS OF RIGHT KNEE: Primary | ICD-10-CM

## 2019-06-24 DIAGNOSIS — M17.11 PRIMARY OSTEOARTHRITIS OF RIGHT KNEE: ICD-10-CM

## 2019-06-24 DIAGNOSIS — Z96.651 S/P TKR (TOTAL KNEE REPLACEMENT), RIGHT: ICD-10-CM

## 2019-06-24 PROCEDURE — 73560 X-RAY EXAM OF KNEE 1 OR 2: CPT | Mod: 26,RT,, | Performed by: RADIOLOGY

## 2019-06-24 PROCEDURE — 99999 PR PBB SHADOW E&M-EST. PATIENT-LVL III: ICD-10-PCS | Mod: PBBFAC,,, | Performed by: NURSE PRACTITIONER

## 2019-06-24 PROCEDURE — 73560 X-RAY EXAM OF KNEE 1 OR 2: CPT | Mod: TC,RT

## 2019-06-24 PROCEDURE — 99999 PR PBB SHADOW E&M-EST. PATIENT-LVL III: CPT | Mod: PBBFAC,,, | Performed by: NURSE PRACTITIONER

## 2019-06-24 PROCEDURE — 73560 XR KNEE 1 OR 2 VIEW RIGHT: ICD-10-PCS | Mod: 26,RT,, | Performed by: RADIOLOGY

## 2019-06-24 PROCEDURE — 99499 UNLISTED E&M SERVICE: CPT | Mod: S$GLB,,, | Performed by: NURSE PRACTITIONER

## 2019-06-24 PROCEDURE — 99499 NO LOS: ICD-10-PCS | Mod: S$GLB,,, | Performed by: NURSE PRACTITIONER

## 2019-06-24 RX ORDER — FLECAINIDE ACETATE 100 MG/1
TABLET ORAL
Refills: 11 | COMMUNITY
Start: 2019-05-30 | End: 2019-08-02 | Stop reason: SDUPTHER

## 2019-06-24 RX ORDER — MIRTAZAPINE 7.5 MG/1
TABLET, FILM COATED ORAL
Refills: 11 | COMMUNITY
Start: 2019-05-04 | End: 2019-10-07

## 2019-06-25 ENCOUNTER — TELEPHONE (OUTPATIENT)
Dept: PREADMISSION TESTING | Facility: HOSPITAL | Age: 64
End: 2019-06-25

## 2019-06-25 ENCOUNTER — TELEPHONE (OUTPATIENT)
Dept: INTERNAL MEDICINE | Facility: CLINIC | Age: 64
End: 2019-06-25

## 2019-06-25 ENCOUNTER — OFFICE VISIT (OUTPATIENT)
Dept: ELECTROPHYSIOLOGY | Facility: CLINIC | Age: 64
End: 2019-06-25
Payer: COMMERCIAL

## 2019-06-25 ENCOUNTER — HOSPITAL ENCOUNTER (OUTPATIENT)
Dept: CARDIOLOGY | Facility: CLINIC | Age: 64
Discharge: HOME | End: 2019-06-25
Payer: COMMERCIAL

## 2019-06-25 VITALS
DIASTOLIC BLOOD PRESSURE: 66 MMHG | SYSTOLIC BLOOD PRESSURE: 104 MMHG | WEIGHT: 177 LBS | HEIGHT: 66 IN | BODY MASS INDEX: 28.45 KG/M2 | HEART RATE: 57 BPM

## 2019-06-25 DIAGNOSIS — I48.0 PAROXYSMAL ATRIAL FIBRILLATION: Primary | ICD-10-CM

## 2019-06-25 DIAGNOSIS — I48.3 TYPICAL ATRIAL FLUTTER: ICD-10-CM

## 2019-06-25 DIAGNOSIS — I48.0 PAROXYSMAL ATRIAL FIBRILLATION: ICD-10-CM

## 2019-06-25 PROCEDURE — 3078F PR MOST RECENT DIASTOLIC BLOOD PRESSURE < 80 MM HG: ICD-10-PCS | Mod: CPTII,S$GLB,, | Performed by: INTERNAL MEDICINE

## 2019-06-25 PROCEDURE — 3074F PR MOST RECENT SYSTOLIC BLOOD PRESSURE < 130 MM HG: ICD-10-PCS | Mod: CPTII,S$GLB,, | Performed by: INTERNAL MEDICINE

## 2019-06-25 PROCEDURE — 99999 PR PBB SHADOW E&M-EST. PATIENT-LVL III: CPT | Mod: PBBFAC,,, | Performed by: INTERNAL MEDICINE

## 2019-06-25 PROCEDURE — 3078F DIAST BP <80 MM HG: CPT | Mod: CPTII,S$GLB,, | Performed by: INTERNAL MEDICINE

## 2019-06-25 PROCEDURE — 93005 ELECTROCARDIOGRAM TRACING: CPT | Mod: S$GLB,,, | Performed by: INTERNAL MEDICINE

## 2019-06-25 PROCEDURE — 3008F BODY MASS INDEX DOCD: CPT | Mod: CPTII,S$GLB,, | Performed by: INTERNAL MEDICINE

## 2019-06-25 PROCEDURE — 99999 PR PBB SHADOW E&M-EST. PATIENT-LVL III: ICD-10-PCS | Mod: PBBFAC,,, | Performed by: INTERNAL MEDICINE

## 2019-06-25 PROCEDURE — 99215 PR OFFICE/OUTPT VISIT, EST, LEVL V, 40-54 MIN: ICD-10-PCS | Mod: S$GLB,,, | Performed by: INTERNAL MEDICINE

## 2019-06-25 PROCEDURE — 93005 RHYTHM STRIP: ICD-10-PCS | Mod: S$GLB,,, | Performed by: INTERNAL MEDICINE

## 2019-06-25 PROCEDURE — 99215 OFFICE O/P EST HI 40 MIN: CPT | Mod: S$GLB,,, | Performed by: INTERNAL MEDICINE

## 2019-06-25 PROCEDURE — 3008F PR BODY MASS INDEX (BMI) DOCUMENTED: ICD-10-PCS | Mod: CPTII,S$GLB,, | Performed by: INTERNAL MEDICINE

## 2019-06-25 PROCEDURE — 93010 ELECTROCARDIOGRAM REPORT: CPT | Mod: S$GLB,,, | Performed by: INTERNAL MEDICINE

## 2019-06-25 PROCEDURE — 93010 RHYTHM STRIP: ICD-10-PCS | Mod: S$GLB,,, | Performed by: INTERNAL MEDICINE

## 2019-06-25 PROCEDURE — 3074F SYST BP LT 130 MM HG: CPT | Mod: CPTII,S$GLB,, | Performed by: INTERNAL MEDICINE

## 2019-06-25 NOTE — TELEPHONE ENCOUNTER
----- Message from Frahana Dover RN sent at 6/25/2019  4:22 PM CDT -----  Regarding: Final PCP clearance  We are looking for a final PCP clearance before surgery scheduled on Thursday 6/27 with Dr. Huerta (Right knee replacement). Clearance was pending labs. Pt. has been cleared per cardiology.    Thanks,  Farhana Dover RN  PreOp Center  Ext. [phone]

## 2019-06-25 NOTE — TELEPHONE ENCOUNTER
----- Message from Sheila Mary MD sent at 6/25/2019 10:18 AM CDT -----      ----- Message -----  From: Marlena Sibley NP  Sent: 6/25/2019   7:50 AM  To: Sheila Mary MD    Hi, sorry for the delayed response. I was out since last Thursday. So previously he had no findings concerning for overt cirrhosis on workup. Since he has quit drinking, his liver tests look great. The biopsy was only to stage the degree of fibrosis he had. I would elect for a Fibroscan again instead of a biopsy since he has quit alcohol. This can be done after his surgery since I see it's scheduled for Thursday this week. I would suspect he's at low risk for any liver related complications from knee surgery.     Let me know if you have any questions regarding this.     Marlena        ----- Message -----  From: Sheila Mary MD  Sent: 6/21/2019  10:55 AM  To: Marlena Sibley NP    This is Sheila Mary, one of the anesthesiologists who is in the pre op center and also part of the Perioperative Surgical Home Team for Total Joint Replacement.  I am inquiring about this patient who is scheduled for a total knee replacement next week.  He was seen by you in February and you had ordered a liver biopsy to further evaluate his fibrosis.  He no showed for several appointments and ended up being admitted for overdose of BB and flecainide and went into cardiac arrest.  He sustained a prolonged hospital course and ultimately ended up behind discharged.  He went through inpatient rehab for alcohol abuse and has abstained from alcohol since then. He was cleared by psych and we were inquiring if you think he needs further work up before proceeding with an elective procedure.     Thanks for your help  Sheila mary

## 2019-06-25 NOTE — ADDENDUM NOTE
Addended by: VENR HANOSN on: 6/25/2019 03:04 PM     Modules accepted: Level of Service, SmartSet

## 2019-06-25 NOTE — PROGRESS NOTES
Subjective:    Patient ID:  Donald Warren Abadie is a 63 y.o. male who presents for follow-up of Atrial Fibrillation      63 yoM pAF here for follow up.    Prior visits: He felt palpitations 6/14 leading to an ER visit. There he was diagnosed with AF. He received diltiazem and ultimately got ativan which converted him to NSR. He felt fatigue, rapid palpitations. No chest pain, sob, syncope with his AF. He has had two other instances of AF, one while working in the heat. No known triggers or alleviating factors. He is on aspirin for CVA prophylaxis (CHADSVASC of 0). He denies ever experiencing similar symptoms prior to his diagnosis. He has had some hypotension on metoprolol 25 mg qd.     2/11/2015: He had a renal mass detected on CT scan performed for a hepatic workup. He ultimately had partial resection with ultimate diagnosis of RCC. He tolerated the surgery well without developing AF.    2/16: Pt reports that overall he feels well, but over the last month he reports experiencing palpitations when he goes to bed at night. He reports approximately 5 episodes this month.    8/16: He continues to have infrequent episodes, once every 2-4 weeks lasting up to 4-5 hours. He has dropped his metoprolol to 12.5 mg once a day due to fatigue.     5/17: Over the past month he has had episodes twice a week lasting 8 hours at a time. He feels that the episodes are related to EtOH intake. The episodes are lasting longer and are more symptomatic. He has stopped EtOH use which has reduced the AF episodes. He remains reluctant to take OAC. He was diagnosed with borderline DM.     1/18: He developed symptomatic AF/RVR leading to urgent clinic visit with Dr Lyles. He was in AF/-120 bpm. Holter showed AF for 2 hours then. Episodes occur infrequently. AF lasted almost 2 days prior to presentation.     Interval history: He took excess metoprolol (in the setting EtOH abuse) leading to profound bradycardia. He was admitted to the  "ICU after a "cardiac arrest" which I presume was a PEA event vs symptomatic bradycardia requiring intubation. He subsequently had EtOH withdrawal, AF and AFL/RVR. On discharge was prescribed daily dosing but takes metoprolol as needed. He has since stopped drinking and underwent an inpatient treatment program. He is due for a knee replacement surgery later this week. Eliquis on hold since Monday. He continues to have breakthrough AF on flecainide and taking metoprolol makes him very dizzy.     Echo 3/19:  · Low normal left ventricular systolic function. The estimated ejection fraction is 50%  · Concentric left ventricular remodeling.  · Septal wall has abnormal motion.  · RV was not well visualized  · Mild tricuspid regurgitation.  · Atrial fibrillation observed.    Past Medical History:  No date: A-fib  No date: Alcohol abuse  No date: Arthritis  No date: Chronic gout  No date: Diabetes mellitus  11/16/2016: DM (diabetes mellitus)  No date: Fatty liver  No date: Hyperlipidemia  2014: Renal cell cancer    Past Surgical History:  No date: ABSCESS DRAINAGE      Comment:  perirectal  No date: COLONOSCOPY  6/17/2019: CRYOTHERAPY, NERVE, PERIPHERAL, PERCUTANEOUS, USING LIQUID   NITROUS OXIDE IN CLOSED NEEDLE DEVICE-right knee iovera; Right      Comment:  Performed by Donny Hair III, MD at [facility] CATH               LAB  5/19/2015: FISTULOTOMY; N/A      Comment:  Performed by Shane Hughes MD at [facility] OR [address] [place] [address]  No date: HAND SURGERY; Left  5/19/2015: HEMORRHOIDECTOMY; N/A      Comment:  Performed by Shane Hughes MD at [facility] OR [address] [place] [address]  No date: KIDNEY SURGERY      Comment:  partial left kidney removal - [place]  August 2014: PARTIAL NEPHRECTOMY; Left  8/28/2014: ROBOTIC ASSISTED LAPAROSCOPIC NEPHRECTOMY PARTIAL; Left      Comment:  Performed by Fabian Estevez MD at [facility] OR [address] [place] [address]    Social History    Socioeconomic History      Marital status: Single      Spouse name: Not on file      Number of " children: Not on file      Years of education: Not on file      Highest education level: Not on file    Occupational History        Employer: [employer]    Social Needs      Financial resource strain: Not on file      Food insecurity:        Worry: Not on file        Inability: Not on file      Transportation needs:        Medical: Not on file        Non-medical: Not on file    Tobacco Use      Smoking status: Never Smoker      Smokeless tobacco: Never Used    Substance and Sexual Activity      Alcohol use: Yes        Alcohol/week: 4.2 oz        Types: 7 Cans of beer per week        Comment: patient states he drinks less than he use to      Drug use: No      Sexual activity: Not on file    Lifestyle      Physical activity:        Days per week: Not on file        Minutes per session: Not on file      Stress: Not on file    Relationships      Social connections:        Talks on phone: Not on file        Gets together: Not on file        Attends [employer] service: Not on file        Active member of club or organization: Not on file        Attends meetings of clubs or organizations: Not on file        Relationship status: Not on file    Other Topics      Concerns:        Patient feels they ought to cut down on drinking/drug use: Not Asked        Patient annoyed by others criticizing their drinking/drug use: Not Asked        Patient has felt bad or guilty about drinking/drug use: Not Asked        Patient has had a drink/used drugs as an eye opener in the AM: Not Asked    Social History Narrative      Not on file      Review of patient's family history indicates:  Problem: Lung cancer      Relation: Father          Age of Onset: (Not Specified)  Problem: Cancer      Relation: Father          Age of Onset: (Not Specified)  Problem: Diabetes      Relation: Mother          Age of Onset: (Not Specified)  Problem: Lung cancer      Relation: Sister          Age of Onset: (Not Specified)  Problem: Colon polyps       Relation: Brother          Age of Onset: (Not Specified)  Problem: Cirrhosis      Relation: Neg Hx          Age of Onset: (Not Specified)        Review of Systems   Constitution: Negative. Negative for chills, decreased appetite, diaphoresis, fever, malaise/fatigue, night sweats, weight gain and weight loss.   HENT: Negative.    Eyes: Negative.  Negative for blurred vision, double vision, visual disturbance and visual halos.   Cardiovascular: Negative for chest pain, claudication, cyanosis, dyspnea on exertion, irregular heartbeat, leg swelling, near-syncope, orthopnea, palpitations, paroxysmal nocturnal dyspnea and syncope.   Respiratory: Negative.  Negative for cough, hemoptysis, shortness of breath, sleep disturbances due to breathing, snoring, sputum production and wheezing.    Endocrine: Negative.  Negative for cold intolerance and heat intolerance.   Hematologic/Lymphatic: Negative.  Negative for adenopathy and bleeding problem. Does not bruise/bleed easily.   Skin: Negative.  Negative for color change, flushing, nail changes and poor wound healing.   Musculoskeletal: Negative.  Negative for falls, joint swelling, muscle cramps, muscle weakness, myalgias and neck pain.   Gastrointestinal: Negative.  Negative for heartburn, hematemesis, jaundice, nausea and vomiting.   Genitourinary: Negative.    Neurological: Negative.  Negative for disturbances in coordination, dizziness, focal weakness, light-headedness, loss of balance, numbness, paresthesias, seizures, sensory change, tremors, vertigo and weakness.   Psychiatric/Behavioral: Negative.  Negative for altered mental status and memory loss. The patient is not nervous/anxious.         Objective:    Physical Exam   Constitutional: He is oriented to person, place, and time. He appears well-developed and well-nourished. No distress.   HENT:   Head: Normocephalic and atraumatic.   Eyes: Pupils are equal, round, and reactive to light. Conjunctivae and EOM are normal.  Right eye exhibits no discharge. Left eye exhibits no discharge.   Neck: Normal range of motion. Neck supple. No JVD present. No thyromegaly present.   Cardiovascular: Normal rate, regular rhythm, normal heart sounds and intact distal pulses.   No murmur heard.  Pulmonary/Chest: Effort normal and breath sounds normal. No respiratory distress. He has no wheezes.   Abdominal: Soft. Bowel sounds are normal. He exhibits no distension. There is no tenderness.   Musculoskeletal: Normal range of motion. He exhibits no edema.   Neurological: He is alert and oriented to person, place, and time. No cranial nerve deficit.   Skin: Skin is warm and dry. No rash noted. He is not diaphoretic. No erythema.   Psychiatric: He has a normal mood and affect. His behavior is normal. Judgment and thought content normal.   Vitals reviewed.  ECG: SBR nl CO, QRS,       Assessment:       1. Paroxysmal atrial fibrillation    2. Typical atrial flutter         Plan:       63 yoM here for pre-operative risk assessment and AF management. With regard to his upcoming surgery, he is at low risk for adverse cardiac events at the time of surgery. He has increased risk of going into AF at the time of surgery due to his prior AF history. I would continue flecainide on admission. Anticoagulation management has been determined by his orthopedic surgery team. With regard to his AF, he is a candidate for PVI. He has symptomatic AF and AFL refractory to AAD therapy. Increased agents have resulted in symptoms. I offered PVI for definitive therapy, to commence one month or more after his knee surgery. I had extensive discussion with patient regarding risks and benefits of PVI/WACA, and he would like to proceed. Risks of procedure include (but are not limited to) bleeding, stroke, perforation requiring emergency draining or surgery, AV block, death, AV fistula, AE fistula, PV stenosis.    Continue anti-coagulation until day prior to procedure.  Discontinue  antiarrhytmic drugs 4 days prior to the procedure.     RF PVI and CTI ablation  Anesthesia  JOHN prior, cancel if NSR  HAYES

## 2019-06-26 ENCOUNTER — TELEPHONE (OUTPATIENT)
Dept: ORTHOPEDICS | Facility: CLINIC | Age: 64
End: 2019-06-26

## 2019-06-26 RX ORDER — MORPHINE SULFATE 10 MG/ML
2 INJECTION, SOLUTION INTRAMUSCULAR; INTRAVENOUS
Status: CANCELLED | OUTPATIENT
Start: 2019-06-26

## 2019-06-26 RX ORDER — MIDAZOLAM HYDROCHLORIDE 1 MG/ML
1 INJECTION INTRAMUSCULAR; INTRAVENOUS EVERY 5 MIN PRN
Status: CANCELLED | OUTPATIENT
Start: 2019-06-26

## 2019-06-26 RX ORDER — BISACODYL 10 MG
10 SUPPOSITORY, RECTAL RECTAL EVERY 12 HOURS PRN
Status: CANCELLED | OUTPATIENT
Start: 2019-06-26

## 2019-06-26 RX ORDER — SODIUM CHLORIDE 9 MG/ML
INJECTION, SOLUTION INTRAVENOUS CONTINUOUS
Status: CANCELLED | OUTPATIENT
Start: 2019-06-26 | End: 2019-06-27

## 2019-06-26 RX ORDER — FAMOTIDINE 20 MG/1
20 TABLET, FILM COATED ORAL 2 TIMES DAILY
Status: CANCELLED | OUTPATIENT
Start: 2019-06-26

## 2019-06-26 RX ORDER — CELECOXIB 100 MG/1
400 CAPSULE ORAL
Status: CANCELLED | OUTPATIENT
Start: 2019-06-26

## 2019-06-26 RX ORDER — OXYCODONE HYDROCHLORIDE 5 MG/1
5 TABLET ORAL
Status: CANCELLED | OUTPATIENT
Start: 2019-06-26

## 2019-06-26 RX ORDER — FENTANYL CITRATE 50 UG/ML
25 INJECTION, SOLUTION INTRAMUSCULAR; INTRAVENOUS EVERY 5 MIN PRN
Status: CANCELLED | OUTPATIENT
Start: 2019-06-26

## 2019-06-26 RX ORDER — OXYCODONE HYDROCHLORIDE 5 MG/1
10 TABLET ORAL
Status: CANCELLED | OUTPATIENT
Start: 2019-06-26

## 2019-06-26 RX ORDER — OXYCODONE HYDROCHLORIDE 5 MG/1
15 TABLET ORAL
Status: CANCELLED | OUTPATIENT
Start: 2019-06-26

## 2019-06-26 RX ORDER — MUPIROCIN 20 MG/G
1 OINTMENT TOPICAL 2 TIMES DAILY
Status: CANCELLED | OUTPATIENT
Start: 2019-06-26 | End: 2019-07-01

## 2019-06-26 RX ORDER — MUPIROCIN 20 MG/G
1 OINTMENT TOPICAL
Status: CANCELLED | OUTPATIENT
Start: 2019-06-26

## 2019-06-26 RX ORDER — RAMELTEON 8 MG/1
8 TABLET ORAL NIGHTLY PRN
Status: CANCELLED | OUTPATIENT
Start: 2019-06-26

## 2019-06-26 RX ORDER — AMOXICILLIN 250 MG
1 CAPSULE ORAL 2 TIMES DAILY
Status: CANCELLED | OUTPATIENT
Start: 2019-06-26

## 2019-06-26 RX ORDER — PREGABALIN 25 MG/1
75 CAPSULE ORAL
Status: CANCELLED | OUTPATIENT
Start: 2019-06-26

## 2019-06-26 RX ORDER — POLYETHYLENE GLYCOL 3350 17 G/17G
17 POWDER, FOR SOLUTION ORAL DAILY
Status: CANCELLED | OUTPATIENT
Start: 2019-06-27

## 2019-06-26 RX ORDER — LIDOCAINE HYDROCHLORIDE 10 MG/ML
1 INJECTION, SOLUTION EPIDURAL; INFILTRATION; INTRACAUDAL; PERINEURAL
Status: CANCELLED | OUTPATIENT
Start: 2019-06-26

## 2019-06-26 RX ORDER — ASPIRIN 81 MG/1
81 TABLET ORAL 2 TIMES DAILY
Status: CANCELLED | OUTPATIENT
Start: 2019-06-26

## 2019-06-26 RX ORDER — ACETAMINOPHEN 500 MG
1000 TABLET ORAL EVERY 6 HOURS
Status: CANCELLED | OUTPATIENT
Start: 2019-06-26 | End: 2019-06-28

## 2019-06-26 RX ORDER — PREGABALIN 25 MG/1
75 CAPSULE ORAL NIGHTLY
Status: CANCELLED | OUTPATIENT
Start: 2019-06-26

## 2019-06-26 RX ORDER — SODIUM CHLORIDE 0.9 % (FLUSH) 0.9 %
10 SYRINGE (ML) INJECTION
Status: CANCELLED | OUTPATIENT
Start: 2019-06-26

## 2019-06-26 RX ORDER — NALOXONE HCL 0.4 MG/ML
0.02 VIAL (ML) INJECTION
Status: CANCELLED | OUTPATIENT
Start: 2019-06-26

## 2019-06-26 RX ORDER — ACETAMINOPHEN 10 MG/ML
1000 INJECTION, SOLUTION INTRAVENOUS ONCE
Status: CANCELLED | OUTPATIENT
Start: 2019-06-26 | End: 2019-06-26

## 2019-06-26 RX ORDER — ONDANSETRON 2 MG/ML
4 INJECTION INTRAMUSCULAR; INTRAVENOUS EVERY 8 HOURS PRN
Status: CANCELLED | OUTPATIENT
Start: 2019-06-26

## 2019-06-26 RX ORDER — ROPIVACAINE HYDROCHLORIDE 2 MG/ML
8 INJECTION, SOLUTION EPIDURAL; INFILTRATION; PERINEURAL CONTINUOUS
Status: CANCELLED | OUTPATIENT
Start: 2019-06-26

## 2019-06-26 RX ORDER — SODIUM CHLORIDE 9 MG/ML
INJECTION, SOLUTION INTRAVENOUS
Status: CANCELLED | OUTPATIENT
Start: 2019-06-26

## 2019-06-26 RX ORDER — CELECOXIB 100 MG/1
200 CAPSULE ORAL DAILY
Status: CANCELLED | OUTPATIENT
Start: 2019-06-27

## 2019-06-26 NOTE — TELEPHONE ENCOUNTER
Spoke to pt, informed his arrival time for tomorrow is 0900 am, 2nd floor surgery center, no food or drink after midnight. Pt verbalized understanding.

## 2019-06-26 NOTE — PROGRESS NOTES
Donald Warren Abadie is a 63 y.o. year old here today for a pre-operative visit in preparation for a Right total knee arthroplasty to be performed by  Dr. Huerta on 6/27/19.  he was last seen and treated in the clinic on 6/5/2019. he will be medically optimized by the pre op center. There has been no significant change in medical status since last visit. No fever, chills, malaise, or unexplained weight change.      Allergies, Medications, past medical and surgical history reviewed.    Focused examination performed.    Patient declined to see Dr. Huerta today in clinic. All questions answered. Patient encouraged to call with questions. Contact information given.     Pre, ned, and post operative procedures and expectations discussed. Questions were answered. Donald Warren Abadie has been educated and is ready to proceed with surgery. Approximately 30 minutes was spent discussing surgical outcomes, plans, procedures pre, ned, and post operative expections and care.  Surgical consent signed.    Donald Warren Abadie will contact us if there are any questions, concerns, or changes in medical status prior to surgery.

## 2019-06-26 NOTE — H&P
CC: Right knee pain    Donald Warren Abadie is a 63 y.o. male with a history of Right knee pain. Pain is worse with activity and weight bearing.  Patient has experienced interference of activities of daily living due to decreased range of motion and an increase in joint pain and swelling.  Patient has failed non-operative treatment including NSAIDs, corticosteroid injections, viscosupplement injections, and activity modification.  Donald Warren Abadie currently ambulates independently.     Relevant medical conditions of significance in perioperative period:  Alcoholism- in therapy and abstinent since 3 months ago  A-fib- on medication managed by cardiology  Gout- on medication managed by pcp    Past Medical History:   Diagnosis Date    A-fib     Alcohol abuse     Arthritis     Chronic gout     Diabetes mellitus     DM (diabetes mellitus) 11/16/2016    Fatty liver     Hyperlipidemia     Renal cell cancer 2014       Past Surgical History:   Procedure Laterality Date    ABSCESS DRAINAGE      perirectal    COLONOSCOPY      CRYOTHERAPY, NERVE, PERIPHERAL, PERCUTANEOUS, USING LIQUID NITROUS OXIDE IN CLOSED NEEDLE DEVICE-right knee iovera Right 6/17/2019    Performed by Donny Hair III, MD at [facility] CATH LAB    FISTULOTOMY N/A 5/19/2015    Performed by Shane Hughes MD at [facility] OR 2ND FLR    HAND SURGERY Left     HEMORRHOIDECTOMY N/A 5/19/2015    Performed by Shane Hughes MD at [facility] OR 2ND FLR    KIDNEY SURGERY      partial left kidney removal - [place]    PARTIAL NEPHRECTOMY Left August 2014    ROBOTIC ASSISTED LAPAROSCOPIC NEPHRECTOMY PARTIAL Left 8/28/2014    Performed by Fabian Estevez MD at [facility] OR 2ND FLR       Family History   Problem Relation Age of Onset    Lung cancer Father     Cancer Father     Diabetes Mother     Lung cancer Sister     Colon polyps Brother     Cirrhosis Neg Hx        Review of patient's allergies indicates:   Allergen Reactions    No known drug  allergies          Current Outpatient Medications:     allopurinol (ZYLOPRIM) 100 MG tablet, TAKE 2 TABLETS BY MOUTH DAILY, Disp: 60 tablet, Rfl: 0    apixaban (ELIQUIS) 5 mg Tab, Take 1 tablet (5 mg total) by mouth 2 (two) times daily., Disp: 60 tablet, Rfl: 11    aspirin 81 MG Chew, Take 1 tablet (81 mg total) by mouth once daily., Disp: 30 tablet, Rfl: 11    cyanocobalamin (VITAMIN B-12) 1000 MCG tablet, Take 1 tablet (1,000 mcg total) by mouth once daily., Disp: , Rfl:     doxepin (SINEQUAN) 10 MG capsule, Take 1 capsule (10 mg total) by mouth every evening., Disp: 30 capsule, Rfl: 1    fenofibrate 160 MG Tab, Take 1 tablet (160 mg total) by mouth every evening., Disp: 90 tablet, Rfl: 3    flecainide (TAMBOCOR) 100 MG Tab, , Disp: , Rfl: 11    folic acid (FOLVITE) 1 MG tablet, Take 1 tablet (1 mg total) by mouth once daily., Disp: 30 tablet, Rfl: 11    gabapentin (NEURONTIN) 300 MG capsule, Take 1 capsule (300 mg total) by mouth every evening., Disp: 30 capsule, Rfl: 2    metoprolol tartrate (LOPRESSOR) 25 MG tablet, Take 0.5 tablets (12.5 mg total) by mouth 2 (two) times daily., Disp: 30 tablet, Rfl: 11    mirtazapine (REMERON) 7.5 MG Tab, , Disp: , Rfl: 11    multivitamin (THERAGRAN) tablet, Take 1 tablet by mouth once daily., Disp: , Rfl:     omega-3 acid ethyl esters (LOVAZA) 1 gram capsule, Take 2 g by mouth 2 (two) times daily., Disp: , Rfl:     pantoprazole (PROTONIX) 40 MG tablet, Take 1 tablet (40 mg total) by mouth once daily., Disp: 30 tablet, Rfl: 11    ranitidine (ZANTAC) 150 MG capsule, Take 150 mg by mouth 2 (two) times daily., Disp: , Rfl:     rosuvastatin (CRESTOR) 10 MG tablet, Take 1 tablet (10 mg total) by mouth once daily., Disp: 90 tablet, Rfl: 3    thiamine 100 MG tablet, Take 1 tablet (100 mg total) by mouth once daily., Disp: , Rfl:     traZODone (DESYREL) 50 MG tablet, Take 1-2 tablets ( mg total) by mouth every evening., Disp: 60 tablet, Rfl: 1    Review of  "Systems:  Constitutional: no fever or chills  Eyes: no visual changes  ENT: no nasal congestion or sore throat  Respiratory: no cough or shortness of breath  Cardiovascular: no chest pain or palpitations  Gastrointestinal: no nausea or vomiting, tolerating diet  Genitourinary: no hematuria or dysuria  Integument/Breast: no rash or pruritis  Hematologic/Lymphatic: no easy bruising or lymphadenopathy  Musculoskeletal: positive for c/o right knee pain which is aching and worse with activity  Neurological: no seizures or tremors  Behavioral/Psych: no auditory or visual hallucinations  Endocrine: no heat or cold intolerance    PE:  Ht 5' 6" (1.676 m)   Wt 80.7 kg (177 lb 12.8 oz)   BMI 28.70 kg/m²   General: Pleasant, cooperative, NAD   Gait: antalgic  HEENT: NCAT, sclera nonicteric   Lungs: Respirations clear bilaterally; equal and unlabored.   CV: S1S2; 2+ bilateral upper and lower extremity pulses.   Skin: Intact throughout with no rashes, erythema, or lesions  Extremities: No LE edema,  no erythema or warmth of the skin in either lower extremity.    Right knee exam:  Knee Range of Motion:normal, pain with passive range of motion  Effusion:none  Condition of skin:intact  Location of tenderness:Medial joint line   Strength:normal  Stability: stable to testing    Hip Examination:normal    Radiographs: Radiographs reveal there is moderate DJD and a varus deformity.  No fracture dislocation bone destruction or OCD seen.    Knee Alignment:  Moderate varus    Diagnosis: osteoarthritis Right knee    Plan: Right total knee arthroplasty    Due to the serious nature of total joint infection and the high prevalence of community acquired MRSA, vancomycin will be used perioperatively.            "

## 2019-06-26 NOTE — H&P (VIEW-ONLY)
CC: Right knee pain    Donald Warren Abadie is a 63 y.o. male with a history of Right knee pain. Pain is worse with activity and weight bearing.  Patient has experienced interference of activities of daily living due to decreased range of motion and an increase in joint pain and swelling.  Patient has failed non-operative treatment including NSAIDs, corticosteroid injections, viscosupplement injections, and activity modification.  Donald Warren Abadie currently ambulates independently.     Relevant medical conditions of significance in perioperative period:  Alcoholism- in therapy and abstinent since 3 months ago  A-fib- on medication managed by cardiology  Gout- on medication managed by pcp    Past Medical History:   Diagnosis Date    A-fib     Alcohol abuse     Arthritis     Chronic gout     Diabetes mellitus     DM (diabetes mellitus) 11/16/2016    Fatty liver     Hyperlipidemia     Renal cell cancer 2014       Past Surgical History:   Procedure Laterality Date    ABSCESS DRAINAGE      perirectal    COLONOSCOPY      CRYOTHERAPY, NERVE, PERIPHERAL, PERCUTANEOUS, USING LIQUID NITROUS OXIDE IN CLOSED NEEDLE DEVICE-right knee iovera Right 6/17/2019    Performed by Donny Hair III, MD at [facility] CATH LAB    FISTULOTOMY N/A 5/19/2015    Performed by Shane Hughes MD at [facility] OR 2ND FLR    HAND SURGERY Left     HEMORRHOIDECTOMY N/A 5/19/2015    Performed by Shane Hughes MD at [facility] OR 2ND FLR    KIDNEY SURGERY      partial left kidney removal - [place]    PARTIAL NEPHRECTOMY Left August 2014    ROBOTIC ASSISTED LAPAROSCOPIC NEPHRECTOMY PARTIAL Left 8/28/2014    Performed by Fabian Estevez MD at [facility] OR 2ND FLR       Family History   Problem Relation Age of Onset    Lung cancer Father     Cancer Father     Diabetes Mother     Lung cancer Sister     Colon polyps Brother     Cirrhosis Neg Hx        Review of patient's allergies indicates:   Allergen Reactions    No known drug  allergies          Current Outpatient Medications:     allopurinol (ZYLOPRIM) 100 MG tablet, TAKE 2 TABLETS BY MOUTH DAILY, Disp: 60 tablet, Rfl: 0    apixaban (ELIQUIS) 5 mg Tab, Take 1 tablet (5 mg total) by mouth 2 (two) times daily., Disp: 60 tablet, Rfl: 11    aspirin 81 MG Chew, Take 1 tablet (81 mg total) by mouth once daily., Disp: 30 tablet, Rfl: 11    cyanocobalamin (VITAMIN B-12) 1000 MCG tablet, Take 1 tablet (1,000 mcg total) by mouth once daily., Disp: , Rfl:     doxepin (SINEQUAN) 10 MG capsule, Take 1 capsule (10 mg total) by mouth every evening., Disp: 30 capsule, Rfl: 1    fenofibrate 160 MG Tab, Take 1 tablet (160 mg total) by mouth every evening., Disp: 90 tablet, Rfl: 3    flecainide (TAMBOCOR) 100 MG Tab, , Disp: , Rfl: 11    folic acid (FOLVITE) 1 MG tablet, Take 1 tablet (1 mg total) by mouth once daily., Disp: 30 tablet, Rfl: 11    gabapentin (NEURONTIN) 300 MG capsule, Take 1 capsule (300 mg total) by mouth every evening., Disp: 30 capsule, Rfl: 2    metoprolol tartrate (LOPRESSOR) 25 MG tablet, Take 0.5 tablets (12.5 mg total) by mouth 2 (two) times daily., Disp: 30 tablet, Rfl: 11    mirtazapine (REMERON) 7.5 MG Tab, , Disp: , Rfl: 11    multivitamin (THERAGRAN) tablet, Take 1 tablet by mouth once daily., Disp: , Rfl:     omega-3 acid ethyl esters (LOVAZA) 1 gram capsule, Take 2 g by mouth 2 (two) times daily., Disp: , Rfl:     pantoprazole (PROTONIX) 40 MG tablet, Take 1 tablet (40 mg total) by mouth once daily., Disp: 30 tablet, Rfl: 11    ranitidine (ZANTAC) 150 MG capsule, Take 150 mg by mouth 2 (two) times daily., Disp: , Rfl:     rosuvastatin (CRESTOR) 10 MG tablet, Take 1 tablet (10 mg total) by mouth once daily., Disp: 90 tablet, Rfl: 3    thiamine 100 MG tablet, Take 1 tablet (100 mg total) by mouth once daily., Disp: , Rfl:     traZODone (DESYREL) 50 MG tablet, Take 1-2 tablets ( mg total) by mouth every evening., Disp: 60 tablet, Rfl: 1    Review of  "Systems:  Constitutional: no fever or chills  Eyes: no visual changes  ENT: no nasal congestion or sore throat  Respiratory: no cough or shortness of breath  Cardiovascular: no chest pain or palpitations  Gastrointestinal: no nausea or vomiting, tolerating diet  Genitourinary: no hematuria or dysuria  Integument/Breast: no rash or pruritis  Hematologic/Lymphatic: no easy bruising or lymphadenopathy  Musculoskeletal: positive for c/o right knee pain which is aching and worse with activity  Neurological: no seizures or tremors  Behavioral/Psych: no auditory or visual hallucinations  Endocrine: no heat or cold intolerance    PE:  Ht 5' 6" (1.676 m)   Wt 80.7 kg (177 lb 12.8 oz)   BMI 28.70 kg/m²   General: Pleasant, cooperative, NAD   Gait: antalgic  HEENT: NCAT, sclera nonicteric   Lungs: Respirations clear bilaterally; equal and unlabored.   CV: S1S2; 2+ bilateral upper and lower extremity pulses.   Skin: Intact throughout with no rashes, erythema, or lesions  Extremities: No LE edema,  no erythema or warmth of the skin in either lower extremity.    Right knee exam:  Knee Range of Motion:normal, pain with passive range of motion  Effusion:none  Condition of skin:intact  Location of tenderness:Medial joint line   Strength:normal  Stability: stable to testing    Hip Examination:normal    Radiographs: Radiographs reveal there is moderate DJD and a varus deformity.  No fracture dislocation bone destruction or OCD seen.    Knee Alignment:  Moderate varus    Diagnosis: osteoarthritis Right knee    Plan: Right total knee arthroplasty    Due to the serious nature of total joint infection and the high prevalence of community acquired MRSA, vancomycin will be used perioperatively.            "

## 2019-06-27 ENCOUNTER — ANESTHESIA (OUTPATIENT)
Dept: SURGERY | Facility: HOSPITAL | Age: 64
DRG: 470 | End: 2019-06-27
Payer: COMMERCIAL

## 2019-06-27 ENCOUNTER — HOSPITAL ENCOUNTER (INPATIENT)
Facility: HOSPITAL | Age: 64
LOS: 1 days | Discharge: HOME | DRG: 470 | End: 2019-06-27
Attending: ORTHOPAEDIC SURGERY | Admitting: ORTHOPAEDIC SURGERY
Payer: COMMERCIAL

## 2019-06-27 VITALS
DIASTOLIC BLOOD PRESSURE: 84 MMHG | OXYGEN SATURATION: 100 % | SYSTOLIC BLOOD PRESSURE: 142 MMHG | HEART RATE: 62 BPM | TEMPERATURE: 99 F | WEIGHT: 180 LBS | BODY MASS INDEX: 28.93 KG/M2 | HEIGHT: 66 IN | RESPIRATION RATE: 16 BRPM

## 2019-06-27 DIAGNOSIS — Z96.651 S/P TKR (TOTAL KNEE REPLACEMENT), RIGHT: Primary | ICD-10-CM

## 2019-06-27 DIAGNOSIS — M17.11 PRIMARY OSTEOARTHRITIS OF RIGHT KNEE: ICD-10-CM

## 2019-06-27 DIAGNOSIS — I48.0 PAROXYSMAL ATRIAL FIBRILLATION: ICD-10-CM

## 2019-06-27 LAB
ANION GAP SERPL CALC-SCNC: 7 MMOL/L (ref 8–16)
BUN SERPL-MCNC: 11 MG/DL (ref 8–23)
CALCIUM SERPL-MCNC: 10.1 MG/DL (ref 8.7–10.5)
CHLORIDE SERPL-SCNC: 103 MMOL/L (ref 95–110)
CO2 SERPL-SCNC: 27 MMOL/L (ref 23–29)
CREAT SERPL-MCNC: 1.2 MG/DL (ref 0.5–1.4)
EST. GFR  (AFRICAN AMERICAN): >60 ML/MIN/1.73 M^2
EST. GFR  (NON AFRICAN AMERICAN): >60 ML/MIN/1.73 M^2
GLUCOSE SERPL-MCNC: 203 MG/DL (ref 70–110)
POCT GLUCOSE: 207 MG/DL (ref 70–110)
POTASSIUM SERPL-SCNC: 4.3 MMOL/L (ref 3.5–5.1)
SODIUM SERPL-SCNC: 137 MMOL/L (ref 136–145)

## 2019-06-27 PROCEDURE — 63600175 PHARM REV CODE 636 W HCPCS: Performed by: ANESTHESIOLOGY

## 2019-06-27 PROCEDURE — 63600175 PHARM REV CODE 636 W HCPCS: Performed by: STUDENT IN AN ORGANIZED HEALTH CARE EDUCATION/TRAINING PROGRAM

## 2019-06-27 PROCEDURE — 25000003 PHARM REV CODE 250: Performed by: ANESTHESIOLOGY

## 2019-06-27 PROCEDURE — C1713 ANCHOR/SCREW BN/BN,TIS/BN: HCPCS | Performed by: ORTHOPAEDIC SURGERY

## 2019-06-27 PROCEDURE — 88305 TISSUE EXAM BY PATHOLOGIST: CPT | Performed by: PATHOLOGY

## 2019-06-27 PROCEDURE — D9220A PRA ANESTHESIA: Mod: CRNA,,, | Performed by: NURSE ANESTHETIST, CERTIFIED REGISTERED

## 2019-06-27 PROCEDURE — 64448 ADDUCTOR CANAL CATHETER: ICD-10-PCS | Mod: 59,RT,, | Performed by: ANESTHESIOLOGY

## 2019-06-27 PROCEDURE — 64448 NJX AA&/STRD FEM NRV NFS IMG: CPT | Mod: 59,RT,, | Performed by: ANESTHESIOLOGY

## 2019-06-27 PROCEDURE — 88311 DECALCIFY TISSUE: CPT | Mod: 26,,, | Performed by: PATHOLOGY

## 2019-06-27 PROCEDURE — 25000003 PHARM REV CODE 250: Performed by: NURSE ANESTHETIST, CERTIFIED REGISTERED

## 2019-06-27 PROCEDURE — 27000221 HC OXYGEN, UP TO 24 HOURS

## 2019-06-27 PROCEDURE — D9220A PRA ANESTHESIA: ICD-10-PCS | Mod: CRNA,,, | Performed by: NURSE ANESTHETIST, CERTIFIED REGISTERED

## 2019-06-27 PROCEDURE — C1776 JOINT DEVICE (IMPLANTABLE): HCPCS | Performed by: ORTHOPAEDIC SURGERY

## 2019-06-27 PROCEDURE — 25000003 PHARM REV CODE 250: Performed by: NURSE PRACTITIONER

## 2019-06-27 PROCEDURE — D9220A PRA ANESTHESIA: Mod: ANES,,, | Performed by: ANESTHESIOLOGY

## 2019-06-27 PROCEDURE — 94799 UNLISTED PULMONARY SVC/PX: CPT

## 2019-06-27 PROCEDURE — 97116 GAIT TRAINING THERAPY: CPT

## 2019-06-27 PROCEDURE — 88305 TISSUE SPECIMEN TO PATHOLOGY - SURGERY: ICD-10-PCS | Mod: 26,,, | Performed by: PATHOLOGY

## 2019-06-27 PROCEDURE — 27201423 OPTIME MED/SURG SUP & DEVICES STERILE SUPPLY: Performed by: ORTHOPAEDIC SURGERY

## 2019-06-27 PROCEDURE — 71000033 HC RECOVERY, INTIAL HOUR: Performed by: ORTHOPAEDIC SURGERY

## 2019-06-27 PROCEDURE — 97161 PT EVAL LOW COMPLEX 20 MIN: CPT

## 2019-06-27 PROCEDURE — 64448 NJX AA&/STRD FEM NRV NFS IMG: CPT | Performed by: STUDENT IN AN ORGANIZED HEALTH CARE EDUCATION/TRAINING PROGRAM

## 2019-06-27 PROCEDURE — 63600175 PHARM REV CODE 636 W HCPCS: Performed by: NURSE PRACTITIONER

## 2019-06-27 PROCEDURE — 71000039 HC RECOVERY, EACH ADD'L HOUR: Performed by: ORTHOPAEDIC SURGERY

## 2019-06-27 PROCEDURE — 36000710: Performed by: ORTHOPAEDIC SURGERY

## 2019-06-27 PROCEDURE — 82962 GLUCOSE BLOOD TEST: CPT | Performed by: ORTHOPAEDIC SURGERY

## 2019-06-27 PROCEDURE — 76942 ECHO GUIDE FOR BIOPSY: CPT | Mod: 26,,, | Performed by: ANESTHESIOLOGY

## 2019-06-27 PROCEDURE — 88311 TISSUE SPECIMEN TO PATHOLOGY - SURGERY: ICD-10-PCS | Mod: 26,,, | Performed by: PATHOLOGY

## 2019-06-27 PROCEDURE — 63600175 PHARM REV CODE 636 W HCPCS: Performed by: REGISTERED NURSE

## 2019-06-27 PROCEDURE — 71000016 HC POSTOP RECOV ADDL HR: Performed by: ORTHOPAEDIC SURGERY

## 2019-06-27 PROCEDURE — 63600175 PHARM REV CODE 636 W HCPCS

## 2019-06-27 PROCEDURE — 88305 TISSUE EXAM BY PATHOLOGIST: CPT | Mod: 26,,, | Performed by: PATHOLOGY

## 2019-06-27 PROCEDURE — 37000009 HC ANESTHESIA EA ADD 15 MINS: Performed by: ORTHOPAEDIC SURGERY

## 2019-06-27 PROCEDURE — 37000008 HC ANESTHESIA 1ST 15 MINUTES: Performed by: ORTHOPAEDIC SURGERY

## 2019-06-27 PROCEDURE — 80048 BASIC METABOLIC PNL TOTAL CA: CPT

## 2019-06-27 PROCEDURE — 27447 TOTAL KNEE ARTHROPLASTY: CPT | Mod: RT,,, | Performed by: ORTHOPAEDIC SURGERY

## 2019-06-27 PROCEDURE — 12000002 HC ACUTE/MED SURGE SEMI-PRIVATE ROOM

## 2019-06-27 PROCEDURE — 71000015 HC POSTOP RECOV 1ST HR: Performed by: ORTHOPAEDIC SURGERY

## 2019-06-27 PROCEDURE — 97535 SELF CARE MNGMENT TRAINING: CPT

## 2019-06-27 PROCEDURE — 76942 ECHO GUIDE FOR BIOPSY: CPT | Performed by: STUDENT IN AN ORGANIZED HEALTH CARE EDUCATION/TRAINING PROGRAM

## 2019-06-27 PROCEDURE — D9220A PRA ANESTHESIA: ICD-10-PCS | Mod: ANES,,, | Performed by: ANESTHESIOLOGY

## 2019-06-27 PROCEDURE — 36415 COLL VENOUS BLD VENIPUNCTURE: CPT

## 2019-06-27 PROCEDURE — 94761 N-INVAS EAR/PLS OXIMETRY MLT: CPT

## 2019-06-27 PROCEDURE — 97165 OT EVAL LOW COMPLEX 30 MIN: CPT

## 2019-06-27 PROCEDURE — 63600175 PHARM REV CODE 636 W HCPCS: Performed by: NURSE ANESTHETIST, CERTIFIED REGISTERED

## 2019-06-27 PROCEDURE — 36000711: Performed by: ORTHOPAEDIC SURGERY

## 2019-06-27 PROCEDURE — 99900035 HC TECH TIME PER 15 MIN (STAT)

## 2019-06-27 PROCEDURE — 76942 ADDUCTOR CANAL CATHETER: ICD-10-PCS | Mod: 26,,, | Performed by: ANESTHESIOLOGY

## 2019-06-27 PROCEDURE — 27447 PR TOTAL KNEE ARTHROPLASTY: ICD-10-PCS | Mod: RT,,, | Performed by: ORTHOPAEDIC SURGERY

## 2019-06-27 PROCEDURE — 25000003 PHARM REV CODE 250: Performed by: ORTHOPAEDIC SURGERY

## 2019-06-27 DEVICE — COMP FEM POST STAB CEM SZ 3 RT: Type: IMPLANTABLE DEVICE | Site: KNEE | Status: FUNCTIONAL

## 2019-06-27 DEVICE — PATELLA TRIATHLON 31X9 SYMTRC: Type: IMPLANTABLE DEVICE | Site: KNEE | Status: FUNCTIONAL

## 2019-06-27 DEVICE — CEMENT BONE SMPLX HV GENTMYCN: Type: IMPLANTABLE DEVICE | Site: KNEE | Status: FUNCTIONAL

## 2019-06-27 DEVICE — BASEPLATE TIB CEM PRIM SZ 4: Type: IMPLANTABLE DEVICE | Site: KNEE | Status: FUNCTIONAL

## 2019-06-27 DEVICE — INSERT TRIATHLON SZ4 11MM: Type: IMPLANTABLE DEVICE | Site: KNEE | Status: FUNCTIONAL

## 2019-06-27 RX ORDER — MORPHINE SULFATE 2 MG/ML
2 INJECTION, SOLUTION INTRAMUSCULAR; INTRAVENOUS
Status: DISCONTINUED | OUTPATIENT
Start: 2019-06-27 | End: 2019-06-27 | Stop reason: HOSPADM

## 2019-06-27 RX ORDER — MIDAZOLAM HYDROCHLORIDE 1 MG/ML
INJECTION, SOLUTION INTRAMUSCULAR; INTRAVENOUS
Status: DISCONTINUED | OUTPATIENT
Start: 2019-06-27 | End: 2019-06-27

## 2019-06-27 RX ORDER — ACETAMINOPHEN 10 MG/ML
1000 INJECTION, SOLUTION INTRAVENOUS ONCE
Status: COMPLETED | OUTPATIENT
Start: 2019-06-27 | End: 2019-06-27

## 2019-06-27 RX ORDER — PROPOFOL 10 MG/ML
VIAL (ML) INTRAVENOUS
Status: DISCONTINUED | OUTPATIENT
Start: 2019-06-27 | End: 2019-06-27

## 2019-06-27 RX ORDER — FAMOTIDINE 20 MG/1
20 TABLET, FILM COATED ORAL 2 TIMES DAILY
Qty: 60 TABLET | Refills: 0 | Status: SHIPPED | OUTPATIENT
Start: 2019-06-27 | End: 2019-10-17

## 2019-06-27 RX ORDER — GLUCAGON 1 MG
1 KIT INJECTION
Status: DISCONTINUED | OUTPATIENT
Start: 2019-06-27 | End: 2019-06-27 | Stop reason: HOSPADM

## 2019-06-27 RX ORDER — LIDOCAINE HYDROCHLORIDE 10 MG/ML
1 INJECTION, SOLUTION EPIDURAL; INFILTRATION; INTRACAUDAL; PERINEURAL
Status: DISCONTINUED | OUTPATIENT
Start: 2019-06-27 | End: 2019-06-27 | Stop reason: HOSPADM

## 2019-06-27 RX ORDER — BISACODYL 10 MG
10 SUPPOSITORY, RECTAL RECTAL EVERY 12 HOURS PRN
Status: DISCONTINUED | OUTPATIENT
Start: 2019-06-27 | End: 2019-06-27 | Stop reason: HOSPADM

## 2019-06-27 RX ORDER — OXYCODONE HYDROCHLORIDE 5 MG/1
5 TABLET ORAL
Status: DISCONTINUED | OUTPATIENT
Start: 2019-06-27 | End: 2019-06-27 | Stop reason: HOSPADM

## 2019-06-27 RX ORDER — DOCUSATE SODIUM 100 MG/1
100 CAPSULE, LIQUID FILLED ORAL 2 TIMES DAILY
Qty: 60 CAPSULE | Refills: 0 | Status: SHIPPED | OUTPATIENT
Start: 2019-06-27 | End: 2019-07-27

## 2019-06-27 RX ORDER — MUPIROCIN 20 MG/G
1 OINTMENT TOPICAL
Status: COMPLETED | OUTPATIENT
Start: 2019-06-27 | End: 2019-06-27

## 2019-06-27 RX ORDER — NALOXONE HCL 0.4 MG/ML
0.02 VIAL (ML) INJECTION
Status: DISCONTINUED | OUTPATIENT
Start: 2019-06-27 | End: 2019-06-27 | Stop reason: HOSPADM

## 2019-06-27 RX ORDER — RAMELTEON 8 MG/1
8 TABLET ORAL NIGHTLY PRN
Status: DISCONTINUED | OUTPATIENT
Start: 2019-06-27 | End: 2019-06-27 | Stop reason: HOSPADM

## 2019-06-27 RX ORDER — FENTANYL CITRATE 50 UG/ML
25 INJECTION, SOLUTION INTRAMUSCULAR; INTRAVENOUS EVERY 5 MIN PRN
Status: DISCONTINUED | OUTPATIENT
Start: 2019-06-27 | End: 2019-06-27 | Stop reason: HOSPADM

## 2019-06-27 RX ORDER — POLYETHYLENE GLYCOL 3350 17 G/17G
17 POWDER, FOR SOLUTION ORAL DAILY
Status: DISCONTINUED | OUTPATIENT
Start: 2019-06-27 | End: 2019-06-27 | Stop reason: HOSPADM

## 2019-06-27 RX ORDER — KETAMINE HCL IN 0.9 % NACL 50 MG/5 ML
SYRINGE (ML) INTRAVENOUS
Status: DISCONTINUED | OUTPATIENT
Start: 2019-06-27 | End: 2019-06-27

## 2019-06-27 RX ORDER — FENTANYL CITRATE 50 UG/ML
25 INJECTION, SOLUTION INTRAMUSCULAR; INTRAVENOUS EVERY 5 MIN PRN
Status: COMPLETED | OUTPATIENT
Start: 2019-06-27 | End: 2019-06-27

## 2019-06-27 RX ORDER — FAMOTIDINE 20 MG/1
20 TABLET, FILM COATED ORAL 2 TIMES DAILY
Status: DISCONTINUED | OUTPATIENT
Start: 2019-06-27 | End: 2019-06-27 | Stop reason: HOSPADM

## 2019-06-27 RX ORDER — PREGABALIN 75 MG/1
75 CAPSULE ORAL
Status: COMPLETED | OUTPATIENT
Start: 2019-06-27 | End: 2019-06-27

## 2019-06-27 RX ORDER — OXYCODONE HYDROCHLORIDE 5 MG/1
15 TABLET ORAL
Status: DISCONTINUED | OUTPATIENT
Start: 2019-06-27 | End: 2019-06-27 | Stop reason: HOSPADM

## 2019-06-27 RX ORDER — ACETAMINOPHEN 500 MG
1000 TABLET ORAL EVERY 6 HOURS
Status: DISCONTINUED | OUTPATIENT
Start: 2019-06-27 | End: 2019-06-27 | Stop reason: HOSPADM

## 2019-06-27 RX ORDER — ALLOPURINOL 100 MG/1
200 TABLET ORAL DAILY
Status: DISCONTINUED | OUTPATIENT
Start: 2019-06-28 | End: 2019-06-27 | Stop reason: HOSPADM

## 2019-06-27 RX ORDER — ROPIVACAINE HYDROCHLORIDE 5 MG/ML
INJECTION, SOLUTION EPIDURAL; INFILTRATION; PERINEURAL
Status: COMPLETED | OUTPATIENT
Start: 2019-06-27 | End: 2019-06-27

## 2019-06-27 RX ORDER — ROPIVACAINE HYDROCHLORIDE 2 MG/ML
8 INJECTION, SOLUTION EPIDURAL; INFILTRATION; PERINEURAL CONTINUOUS
Status: DISCONTINUED | OUTPATIENT
Start: 2019-06-27 | End: 2019-06-27 | Stop reason: HOSPADM

## 2019-06-27 RX ORDER — IBUPROFEN 200 MG
16 TABLET ORAL
Status: DISCONTINUED | OUTPATIENT
Start: 2019-06-27 | End: 2019-06-27 | Stop reason: HOSPADM

## 2019-06-27 RX ORDER — CEFAZOLIN SODIUM 1 G/3ML
2 INJECTION, POWDER, FOR SOLUTION INTRAMUSCULAR; INTRAVENOUS
Status: COMPLETED | OUTPATIENT
Start: 2019-06-27 | End: 2019-06-27

## 2019-06-27 RX ORDER — ONDANSETRON HYDROCHLORIDE 8 MG/1
8 TABLET, FILM COATED ORAL EVERY 8 HOURS PRN
Qty: 30 TABLET | Refills: 0 | Status: SHIPPED | OUTPATIENT
Start: 2019-06-27 | End: 2023-10-28 | Stop reason: ALTCHOICE

## 2019-06-27 RX ORDER — FLECAINIDE ACETATE 100 MG/1
100 TABLET ORAL EVERY 12 HOURS
Status: DISCONTINUED | OUTPATIENT
Start: 2019-06-27 | End: 2019-06-27

## 2019-06-27 RX ORDER — CEFAZOLIN SODIUM 1 G/3ML
2 INJECTION, POWDER, FOR SOLUTION INTRAMUSCULAR; INTRAVENOUS
Status: DISCONTINUED | OUTPATIENT
Start: 2019-06-27 | End: 2019-06-27 | Stop reason: HOSPADM

## 2019-06-27 RX ORDER — WARFARIN 2.5 MG/1
2.5 TABLET ORAL DAILY
Qty: 7 TABLET | Refills: 0 | Status: SHIPPED | OUTPATIENT
Start: 2019-06-27 | End: 2019-07-02 | Stop reason: ALTCHOICE

## 2019-06-27 RX ORDER — AMOXICILLIN 250 MG
1 CAPSULE ORAL 2 TIMES DAILY
Status: DISCONTINUED | OUTPATIENT
Start: 2019-06-27 | End: 2019-06-27 | Stop reason: HOSPADM

## 2019-06-27 RX ORDER — FENOFIBRATE 160 MG/1
160 TABLET ORAL NIGHTLY
Status: DISCONTINUED | OUTPATIENT
Start: 2019-06-27 | End: 2019-06-27 | Stop reason: HOSPADM

## 2019-06-27 RX ORDER — VANCOMYCIN HCL IN 5 % DEXTROSE 1G/250ML
1000 PLASTIC BAG, INJECTION (ML) INTRAVENOUS
Status: COMPLETED | OUTPATIENT
Start: 2019-06-27 | End: 2019-06-27

## 2019-06-27 RX ORDER — LORAZEPAM 0.5 MG/1
0.5 TABLET ORAL ONCE AS NEEDED
Status: DISCONTINUED | OUTPATIENT
Start: 2019-06-27 | End: 2019-06-27 | Stop reason: HOSPADM

## 2019-06-27 RX ORDER — MIDAZOLAM HYDROCHLORIDE 1 MG/ML
1 INJECTION INTRAMUSCULAR; INTRAVENOUS EVERY 5 MIN PRN
Status: DISCONTINUED | OUTPATIENT
Start: 2019-06-27 | End: 2019-06-27 | Stop reason: HOSPADM

## 2019-06-27 RX ORDER — PROPOFOL 10 MG/ML
VIAL (ML) INTRAVENOUS CONTINUOUS PRN
Status: DISCONTINUED | OUTPATIENT
Start: 2019-06-27 | End: 2019-06-27

## 2019-06-27 RX ORDER — SODIUM CHLORIDE 9 MG/ML
INJECTION, SOLUTION INTRAVENOUS
Status: COMPLETED | OUTPATIENT
Start: 2019-06-27 | End: 2019-06-27

## 2019-06-27 RX ORDER — MIDAZOLAM HYDROCHLORIDE 1 MG/ML
0.5 INJECTION INTRAMUSCULAR; INTRAVENOUS
Status: DISCONTINUED | OUTPATIENT
Start: 2019-06-27 | End: 2019-06-27 | Stop reason: HOSPADM

## 2019-06-27 RX ORDER — SODIUM CHLORIDE 0.9 % (FLUSH) 0.9 %
10 SYRINGE (ML) INJECTION
Status: DISCONTINUED | OUTPATIENT
Start: 2019-06-27 | End: 2019-06-27 | Stop reason: HOSPADM

## 2019-06-27 RX ORDER — INSULIN ASPART 100 [IU]/ML
1-10 INJECTION, SOLUTION INTRAVENOUS; SUBCUTANEOUS
Status: DISCONTINUED | OUTPATIENT
Start: 2019-06-27 | End: 2019-06-27 | Stop reason: HOSPADM

## 2019-06-27 RX ORDER — IBUPROFEN 200 MG
24 TABLET ORAL
Status: DISCONTINUED | OUTPATIENT
Start: 2019-06-27 | End: 2019-06-27 | Stop reason: HOSPADM

## 2019-06-27 RX ORDER — SODIUM CHLORIDE 9 MG/ML
INJECTION, SOLUTION INTRAVENOUS CONTINUOUS
Status: DISCONTINUED | OUTPATIENT
Start: 2019-06-27 | End: 2019-06-27 | Stop reason: HOSPADM

## 2019-06-27 RX ORDER — CELECOXIB 200 MG/1
400 CAPSULE ORAL
Status: COMPLETED | OUTPATIENT
Start: 2019-06-27 | End: 2019-06-27

## 2019-06-27 RX ORDER — PREGABALIN 50 MG/1
150 CAPSULE ORAL NIGHTLY
Status: DISCONTINUED | OUTPATIENT
Start: 2019-06-27 | End: 2019-06-27 | Stop reason: HOSPADM

## 2019-06-27 RX ORDER — ASPIRIN 81 MG/1
81 TABLET ORAL 2 TIMES DAILY
Status: DISCONTINUED | OUTPATIENT
Start: 2019-06-27 | End: 2019-06-27 | Stop reason: HOSPADM

## 2019-06-27 RX ORDER — SODIUM CHLORIDE 0.9 % (FLUSH) 0.9 %
3 SYRINGE (ML) INJECTION
Status: DISCONTINUED | OUTPATIENT
Start: 2019-06-27 | End: 2019-06-27 | Stop reason: HOSPADM

## 2019-06-27 RX ORDER — ROSUVASTATIN CALCIUM 10 MG/1
10 TABLET, COATED ORAL DAILY
Status: DISCONTINUED | OUTPATIENT
Start: 2019-06-28 | End: 2019-06-27 | Stop reason: HOSPADM

## 2019-06-27 RX ORDER — DEXAMETHASONE SODIUM PHOSPHATE 4 MG/ML
INJECTION, SOLUTION INTRA-ARTICULAR; INTRALESIONAL; INTRAMUSCULAR; INTRAVENOUS; SOFT TISSUE
Status: DISCONTINUED | OUTPATIENT
Start: 2019-06-27 | End: 2019-06-27

## 2019-06-27 RX ORDER — ACETAMINOPHEN 10 MG/ML
INJECTION, SOLUTION INTRAVENOUS
Status: COMPLETED
Start: 2019-06-27 | End: 2019-06-27

## 2019-06-27 RX ORDER — FLECAINIDE ACETATE 50 MG/1
150 TABLET ORAL EVERY 12 HOURS
Status: DISCONTINUED | OUTPATIENT
Start: 2019-06-27 | End: 2019-06-27 | Stop reason: HOSPADM

## 2019-06-27 RX ORDER — FENTANYL CITRATE 50 UG/ML
INJECTION, SOLUTION INTRAMUSCULAR; INTRAVENOUS
Status: DISCONTINUED | OUTPATIENT
Start: 2019-06-27 | End: 2019-06-27

## 2019-06-27 RX ORDER — ONDANSETRON 2 MG/ML
4 INJECTION INTRAMUSCULAR; INTRAVENOUS EVERY 8 HOURS PRN
Status: DISCONTINUED | OUTPATIENT
Start: 2019-06-27 | End: 2019-06-27 | Stop reason: HOSPADM

## 2019-06-27 RX ORDER — OXYCODONE AND ACETAMINOPHEN 10; 325 MG/1; MG/1
1 TABLET ORAL EVERY 4 HOURS PRN
Qty: 42 TABLET | Refills: 0 | Status: SHIPPED | OUTPATIENT
Start: 2019-06-27 | End: 2019-07-05 | Stop reason: SDUPTHER

## 2019-06-27 RX ORDER — MUPIROCIN 20 MG/G
1 OINTMENT TOPICAL 2 TIMES DAILY
Status: DISCONTINUED | OUTPATIENT
Start: 2019-06-27 | End: 2019-06-27 | Stop reason: HOSPADM

## 2019-06-27 RX ORDER — OXYCODONE HYDROCHLORIDE 5 MG/1
10 TABLET ORAL
Status: DISCONTINUED | OUTPATIENT
Start: 2019-06-27 | End: 2019-06-27 | Stop reason: HOSPADM

## 2019-06-27 RX ORDER — LIDOCAINE HCL/PF 100 MG/5ML
SYRINGE (ML) INTRAVENOUS
Status: DISCONTINUED | OUTPATIENT
Start: 2019-06-27 | End: 2019-06-27

## 2019-06-27 RX ADMIN — CEFAZOLIN 2 G: 330 INJECTION, POWDER, FOR SOLUTION INTRAMUSCULAR; INTRAVENOUS at 11:06

## 2019-06-27 RX ADMIN — PREGABALIN 75 MG: 75 CAPSULE ORAL at 09:06

## 2019-06-27 RX ADMIN — PROPOFOL 75 MCG/KG/MIN: 10 INJECTION, EMULSION INTRAVENOUS at 11:06

## 2019-06-27 RX ADMIN — SODIUM CHLORIDE: 0.9 INJECTION, SOLUTION INTRAVENOUS at 02:06

## 2019-06-27 RX ADMIN — FENTANYL CITRATE 25 MCG: 50 INJECTION, SOLUTION INTRAMUSCULAR; INTRAVENOUS at 11:06

## 2019-06-27 RX ADMIN — ROPIVACAINE HYDROCHLORIDE 10 ML: 5 INJECTION, SOLUTION EPIDURAL; INFILTRATION; PERINEURAL at 10:06

## 2019-06-27 RX ADMIN — FLECAINIDE ACETATE 150 MG: 50 TABLET ORAL at 04:06

## 2019-06-27 RX ADMIN — POLYETHYLENE GLYCOL 3350 17 G: 17 POWDER, FOR SOLUTION ORAL at 05:06

## 2019-06-27 RX ADMIN — MUPIROCIN 1 G: 20 OINTMENT TOPICAL at 09:06

## 2019-06-27 RX ADMIN — VANCOMYCIN HYDROCHLORIDE 1000 MG: 1 INJECTION, POWDER, LYOPHILIZED, FOR SOLUTION INTRAVENOUS at 10:06

## 2019-06-27 RX ADMIN — MIDAZOLAM HYDROCHLORIDE 2 MG: 1 INJECTION, SOLUTION INTRAMUSCULAR; INTRAVENOUS at 10:06

## 2019-06-27 RX ADMIN — FENTANYL CITRATE 25 MCG: 50 INJECTION INTRAMUSCULAR; INTRAVENOUS at 02:06

## 2019-06-27 RX ADMIN — ACETAMINOPHEN 1000 MG: 10 INJECTION, SOLUTION INTRAVENOUS at 03:06

## 2019-06-27 RX ADMIN — DEXAMETHASONE SODIUM PHOSPHATE 8 MG: 4 INJECTION, SOLUTION INTRAMUSCULAR; INTRAVENOUS at 11:06

## 2019-06-27 RX ADMIN — OXYCODONE HYDROCHLORIDE 15 MG: 5 TABLET ORAL at 02:06

## 2019-06-27 RX ADMIN — OXYCODONE HYDROCHLORIDE 10 MG: 5 TABLET ORAL at 06:06

## 2019-06-27 RX ADMIN — FENTANYL CITRATE 25 MCG: 50 INJECTION INTRAMUSCULAR; INTRAVENOUS at 03:06

## 2019-06-27 RX ADMIN — SODIUM CHLORIDE, SODIUM GLUCONATE, SODIUM ACETATE, POTASSIUM CHLORIDE, MAGNESIUM CHLORIDE, SODIUM PHOSPHATE, DIBASIC, AND POTASSIUM PHOSPHATE: .53; .5; .37; .037; .03; .012; .00082 INJECTION, SOLUTION INTRAVENOUS at 11:06

## 2019-06-27 RX ADMIN — Medication 25 MG: at 11:06

## 2019-06-27 RX ADMIN — ROPIVACAINE HYDROCHLORIDE 8 ML/HR: 2 INJECTION, SOLUTION EPIDURAL; INFILTRATION at 02:06

## 2019-06-27 RX ADMIN — PROPOFOL 10 MG: 10 INJECTION, EMULSION INTRAVENOUS at 11:06

## 2019-06-27 RX ADMIN — MIDAZOLAM HYDROCHLORIDE 2 MG: 1 INJECTION, SOLUTION INTRAMUSCULAR; INTRAVENOUS at 11:06

## 2019-06-27 RX ADMIN — CELECOXIB 400 MG: 200 CAPSULE ORAL at 09:06

## 2019-06-27 RX ADMIN — LIDOCAINE HYDROCHLORIDE 40 MG: 20 INJECTION, SOLUTION INTRAVENOUS at 11:06

## 2019-06-27 RX ADMIN — MEPIVACAINE HYDROCHLORIDE 3 ML: 15 INJECTION, SOLUTION EPIDURAL; INFILTRATION at 11:06

## 2019-06-27 RX ADMIN — ROPIVACAINE HYDROCHLORIDE: 2 INJECTION, SOLUTION EPIDURAL; INFILTRATION at 06:06

## 2019-06-27 RX ADMIN — SODIUM CHLORIDE: 0.9 INJECTION, SOLUTION INTRAVENOUS at 11:06

## 2019-06-27 NOTE — INTERVAL H&P NOTE
Donald Warren Abadie was interviewed, examined and the H and P reviewed.  There has been no interval change in his History and Physical.

## 2019-06-27 NOTE — PLAN OF CARE
Problem: Occupational Therapy Goal  Goal: Occupational Therapy Goal  Goals to be met by: 7/4/2019    Patient will increase functional independence with ADLs by performing:    LE Dressing with Modified [place].  Toileting from toilet with Modified [place] for hygiene and clothing management.   Supine to sit with Modified [place].  Toilet transfer to toilet with Modified [place].    Outcome: Outcome(s) achieved Date Met: 06/27/19  OT goals established and met.

## 2019-06-27 NOTE — TRANSFER OF CARE
"Anesthesia Transfer of Care Note    Patient: Donald Warren Abadie    Procedure(s) Performed: Procedure(s) (LRB):  ARTHROPLASTY, KNEE, TOTAL-SAME DAY (Right)    Patient location: PACU    Anesthesia Type: spinal    Transport from OR: Transported from OR on 6-10 L/min O2 by face mask with adequate spontaneous ventilation    Post pain: adequate analgesia    Post assessment: tolerated procedure well and no apparent anesthetic complications    Post vital signs: stable    Level of consciousness: sedated    Nausea/Vomiting: no nausea/vomiting    Complications: none    Transfer of care protocol was followed      Last vitals:   Visit Vitals  /65 (BP Location: Left arm, Patient Position: Lying)   Pulse (!) 57   Temp 36.9 °C (98.5 °F) (Oral)   Resp 14   Ht 5' 6" (1.676 m)   Wt 81.6 kg (180 lb)   SpO2 100%   BMI 29.05 kg/m²     "

## 2019-06-27 NOTE — ASSESSMENT & PLAN NOTE
Donald Warren Abadie is a 63 y.o. male is s/p R TKA on 6/27/19    Surgical dressing C/D/I, bulky dressing taken down today, polar ice in pace  Pain control: multimodal, Anesthesia Surgical Home following  PT/OT: WBAT RLE  DVT PPx: ASA 81 BID, FCDs at all times when not ambulating  Abx: postop Ancef  Drain: none  Lynn: none    Dispo: plan to dc to home today once cleared by PT

## 2019-06-27 NOTE — ANESTHESIA PROCEDURE NOTES
Adductor Canal Catheter    Patient location during procedure: pre-op   Block not for primary anesthetic.  Reason for block: at surgeon's request and post-op pain management   Post-op Pain Location: right knee pain  Start time: 6/27/2019 10:31 AM  Timeout: 6/27/2019 10:31 AM   End time: 6/27/2019 10:41 AM  Staffing  Other anesthesia staff: Enmanuel Vaughan MD  Preanesthetic Checklist  Completed: patient identified, site marked, surgical consent, pre-op evaluation, timeout performed, IV checked, risks and benefits discussed and monitors and equipment checked  Peripheral Block  Patient position: supine  Prep: ChloraPrep and site prepped and draped  Patient monitoring: heart rate, cardiac monitor, continuous pulse ox, continuous capnometry and frequent blood pressure checks  Block type: adductor canal  Laterality: right  Injection technique: continuous  Needle  Needle type: Tuohy   Needle gauge: 17 G  Needle length: 3.5 in  Needle localization: anatomical landmarks and ultrasound guidance  Catheter type: spring wound  Catheter size: 19 G  Test dose: lidocaine 1.5% with Epi 1-to-200,000 and negative   -ultrasound image captured on disc.  Assessment  Injection assessment: negative aspiration, negative parasthesia and local visualized surrounding nerve  Paresthesia pain: none  Heart rate change: no  Slow fractionated injection: yes  Additional Notes  VSS.  [facility] RN monitoring vitals throughout procedure.  Patient tolerated procedure well.   20ml of .25% of ropivicaine given without any complications.   Authorizing Provider: THAI Evanserforming Provider: Christel Carpenter MD

## 2019-06-27 NOTE — ANESTHESIA PROCEDURE NOTES
Spinal    Diagnosis: arthritis right knee  Patient location during procedure: OR  Start time: 6/27/2019 11:21 AM  Timeout: 6/27/2019 11:19 AM  End time: 6/27/2019 11:25 AMAuthorizing Provider: THAI Normanerforming Provider: Angelina Randall MD  Preanesthetic Checklist  Completed: patient identified, site marked, surgical consent, pre-op evaluation, timeout performed, IV checked, risks and benefits discussed and monitors and equipment checked  Spinal Block  Patient position: sitting  Prep: ChloraPrep  Patient monitoring: heart rate, cardiac monitor, continuous pulse ox and frequent blood pressure checks  Approach: midline  Location: L3-4  Injection technique: single shot  CSF Fluid: clear free-flowing CSF  Needle  Needle type: pencil-tip   Needle gauge: 25 G  Needle length: 3.5 in  Additional Documentation: incremental injection, negative aspiration for heme and no paresthesia on injection  Needle localization: anatomical landmarks  Assessment  Sensory level: T6  Ease of block: easy  Patient's tolerance of the procedure: comfortable throughout block and no complaints

## 2019-06-27 NOTE — OP NOTE
DATE OF PROCEDURE: 6/27/19  PREOPERATIVE DIAGNOSIS: Arthritis, Right knee.   POSTOPERATIVE DIAGNOSIS: Arthritis, Right knee.   PROCEDURES PERFORMED: Right total knee arthroplasty.   SURGEON: Mikael Huerta M.D.   ASSISTANT: MD Justin  ANESTHESIA: Regional.   COMPLICATIONS: None.   COUNTS: Correct.   DISPOSITION: Recovery Room, stable.   SPECIMENS: Bone and cartilage.   FINDINGS: Tricompartmental degenerative change.   FLUIDS: 2000 mL.   BLOOD LOSS: Less than 50 mL.   IMPLANTS: Carin Triathlon, size 3 Right posterior stabilized   cemented femoral component with a 4 cemented tibial tray, a 31 mm 3-peg   patella and a size 4, 11 mm posterior stabilized polyethylene insert.   INDICATIONS FOR PROCEDURE: Donald Warren Abadie is a 63-year-old male who has   severe arthritis in the Right knee . He is having continued pain in the   Right knee and did not respond to nonoperative measures, decided to undergo   Right knee replacement. He is aware of reasonable treatment options as   well as risks and benefits. {He did not respond to NSAIDS or injections. He received a course or pre-operative physical therapy.  PROCEDURE IN DETAIL: After appropriate consent was obtained, the patient   Was brought in the Operating Room, anesthesia was administered. He received   antibiotic prophylaxis.  Cast   padding and tourniquet was applied to the proximal Right thigh. Right  lower extremity was prepped and draped in usual sterile fashion. Limb was   elevated, tourniquet was inflated. The knee was flexed. An incision was   made from the tibial tubercle just proximal to the superior pole of the   patella. It was taken down through the skin and retinacular. A medial   parapatellar arthrotomy was performed followed by a standard medial   release. Patellar fat pad was partially excised. ACL was resected.   Femoral punch was used to make the guide hole for the femoral drill. The   distal cutting guide was set at 6 degrees valgus right.  A  standard distal femoral cut was made.We were pleased with the alignment and quality of the cut.  The PCL retractor was used to bring   the tibia into the field. Meniscal remnants were resected. The   extramedullary tibial guide was pinned in place using the medial side, as most   defective, 2 mm bone was taken off of this. We checked the alignment in   both planes both before and after making the cut. We were satisfied with the   alignment of the cut and the amount of bone removed.The femur was then  sized, found to be a size 3. A size 3 cutting guide was pinned in 3   degrees of external rotation. This was based off the posterior condyle,   checked the transepicondylar axis. Anterior, posterior and chamfer cuts   were made. The box guide was pinned in position. Femoral box cut was   made. Bone fragments were removed. Lamina spreaders were   then used to open up posteriorly. The PCL was resected and some soft   tissue debris was removed.  An 11 mm spacer block gave excellent flexion-extension gap balance.   I was also satisfied with the medial and lateral stability. At this   point, the femoral trial was placed. The tibia was sized, found to be a   Size 4. A size 4 tibial trial was pinned in appropriate alignment and   rotation. This was based on the medial one third of the tibial tubercle.  A stem extension hole was drilled. A keel hole was punched and a   trial reduction was performed with a size 4, 11 mm insert. He  achieved full   extension. There was no recurvatum. He easily had 125 degrees of flexion.   The femoral component ranged symmetrically in the tibial tray. There was   no lift off. There was no instability of full extension, 30 degrees of   flexion, mid flexion and full flexion. Patella was measured and found to   be 24 mm thick, 8 mm of bone removed. The 3-peg holes were drilled 31 mm   3-peg trial was placed. The knee was brought through range of motion. The   patella tracked extremely well.  Therefore, at this point, we were   satisfied with knee range of motion and stability, component position,   as well as patella tracking. All trial components   were removed. Bone was prepared for cementing by pulsatile lavage and   drying. Components were cemented into place, femur followed by tibia.   Meticulous care was taken to remove excess cement. Tibial insert was   firmly seated in the tibial tray. The knee was inspected. There was no   loose body, foreign body or soft tissue interposition. Knee was then   reduced, brought into extension. Patella button was cemented in place.   Excess cement was removed. The wound was then copiously irrigated with   antibiotic-impregnated solution. Once the cement was dried, tranexamic   acid was applied. The arthrotomy was closed with #1 Vicryl. Once the   arthrotomy was closed, the knee was brought through range of motion, stable   as previously described. Patella tracked very well. Retinacular was   closed with 0 Vicryl, subcutaneous layer with 2-0 Vicryl, skin with   monocryl and dermabond. Sterile dressing was applied. Tourniquet was let down.  He was   transferred to a stretcher, brought to Recovery Room in stable condition,   tolerated the procedure well, no known complications.

## 2019-06-27 NOTE — PT/OT/SLP EVAL
Occupational Therapy   Evaluation and Discharge Summary    Name: Donald Warren Abadie  MRN: [id number]  Admitting Diagnosis:  S/P TKR (total knee replacement), right Day of Surgery    Recommendations:     Discharge Recommendations: home, outpatient OT  Discharge Equipment Recommendations:  walker, rolling  Barriers to discharge:  None    Assessment:     Donald Warren Abadie is a 63 y.o. male with a medical diagnosis of S/P TKR (total knee replacement), right.  He presents with the following Performance deficits affecting function: impaired balance, impaired self care skills, pain.      Rehab Prognosis: Good; patient would benefit from acute skilled OT services to address these deficits and reach maximum level of function.       Plan:     Patient to be seen   to address the above listed problems via self-care/home management, therapeutic activities, therapeutic exercises  · Plan of Care Expires: 07/04/19  · Plan of Care Reviewed with: patient, daughter    Subjective     Chief Complaint: none stated  Patient/Family Comments/goals: go home     Occupational Profile:  Living Environment: Pt. Lives alone in [facility] with threshold to enter.  Previous level of function: Modified Independent  Equipment Used at Home:  rollator, bath bench, bedside commode  Assistance upon Discharge: Daughter is able to stay with pt. As long as needed    Pain/Comfort:  · Pain Rating 1: 0/10    Patients cultural, spiritual, Mandaen conflicts given the current situation: no    Objective:     Communicated with: RN prior to session.  Patient found supine with cryotherapy, FCD, peripheral IV, telemetry, blood pressure cuff upon OT entry to room.    General Precautions: Standard, fall   Orthopedic Precautions:RLE weight bearing as tolerated   Braces: N/A     Occupational Performance:    Bed Mobility:    · Patient completed Rolling/Turning to Left with  modified independence  · Patient completed Supine to Sit with modified independence    Functional  Mobility/Transfers:  · Patient completed Sit <> Stand Transfer with modified independence  with  rolling walker   · Patient completed Toilet Transfer Step Transfer technique with modified independence with  rolling walker  · Functional Mobility: Pt. Ambulated ~50 feet CGA using RW. Pt. Went up/down one step with CGA.     Activities of Daily Living:  · Lower Body Dressing: supervision sobeida sock  · Toileting: supervision pt. able to perform clothing and PNC management    Cognitive/Visual Perceptual:  Cognitive/Psychosocial Skills:     -       Oriented to: Person, Place, Time and Situation   -       Safety awareness/insight to disability: intact     Physical Exam:  Upper Extremity Range of Motion:     -       Right Upper Extremity: WFL  -       Left Upper Extremity: WFL  Upper Extremity Strength:    -       Right Upper Extremity: WFL  -       Left Upper Extremity: WFL   Strength:    -       Right Upper Extremity: WFL  -       Left Upper Extremity: WFL    AMPAC 6 Click ADL:  AMPAC Total Score: 22    Treatment & Education:  - OT role/POC  - Pt. Educated on cryotherapy protocols.  - Pt. Educated RW management    Education:    Patient left up in chair with all lines intact, call button in reach and RN notified    GOALS:   Multidisciplinary Problems     Occupational Therapy Goals     Not on file          Multidisciplinary Problems (Resolved)        Problem: Occupational Therapy Goal    Goal Priority Disciplines Outcome Interventions   Occupational Therapy Goal   (Resolved)     OT, PT/OT Outcome(s) achieved    Description:  Goals to be met by: 7/4/2019    Patient will increase functional independence with ADLs by performing:    LE Dressing with Modified [place].  Toileting from toilet with Modified [place] for hygiene and clothing management.   Supine to sit with Modified [place].  Toilet transfer to toilet with Modified [place].                      History:     Past Medical History:   Diagnosis Date     A-fib     Alcohol abuse     Anxiety     Arthritis     Chronic gout     Diabetes mellitus     DM (diabetes mellitus) 11/16/2016    Fatty liver     Hyperlipidemia     Renal cell cancer 2014       Past Surgical History:   Procedure Laterality Date    ABSCESS DRAINAGE      perirectal    COLONOSCOPY      CRYOTHERAPY, NERVE, PERIPHERAL, PERCUTANEOUS, USING LIQUID NITROUS OXIDE IN CLOSED NEEDLE DEVICE-right knee iovera Right 6/17/2019    Performed by Donny [facility] III[facility] at [facility] CATH LAB    FISTULOTOMY N/A 5/19/2015    Performed by Shane Hughes MD at [facility] OR [address] [place] [address]    HAND SURGERY Left     HEMORRHOIDECTOMY N/A 5/19/2015    Performed by Shane Hughes MD at [facility] OR [address] [place] [address]    KIDNEY SURGERY      partial left kidney removal - [place]    PARTIAL NEPHRECTOMY Left August 2014    ROBOTIC ASSISTED LAPAROSCOPIC NEPHRECTOMY PARTIAL Left 8/28/2014    Performed by Fabian Estevez MD at [facility] OR [address] [place] [address]       Time Tracking:     OT Date of Treatment: 06/27/19  OT Start Time: 1629  OT Stop Time: 1649  OT Total Time (min): 20 min    Billable Minutes:Evaluation 10  Self Care/Home Management 10    Judi Butler, OT  6/27/2019

## 2019-06-27 NOTE — PT/OT/SLP EVAL
Physical Therapy Evaluation    Patient Name:  Donald Warren Abadie   MRN:  [id number]    Recommendations:     Discharge Recommendations:  outpatient PT   Discharge Equipment Recommendations: walker, rolling   Barriers to discharge: Decreased caregiver support and daughter staying with patient    Assessment:       Donald Warren Abadie is a 63 y.o. male admitted with a medical diagnosis of S/P TKR (total knee replacement), right on 6/27/19.  He presents with the following impairments/functional limitations:  impaired functional mobilty, impaired self care skills, decreased ROM, decreased lower extremity function, orthopedic precautions.  Patient demonstrated good functional mobility and safety with ambulation and stair climbing.    Rehab Prognosis: Good; Patient with plan to discharge home from PACU without further acute care PT and is scheduled for outpatient PT.  Recent Surgery: Procedure(s) (LRB):  ARTHROPLASTY, KNEE, TOTAL-SAME DAY (Right) Day of Surgery    Plan:     During this hospitalization, patient to be seen BID to address the identified rehab impairments via gait training, therapeutic activities, therapeutic exercises, neuromuscular re-education and progress toward the following goals:    · Plan of Care Expires:  07/05/19    Subjective     Chief Complaint: I want to get home  Pain/Comfort:  · Pain Rating 1: 2/10  · Location - Side 1: Right  · Location - Orientation 1: generalized  · Location 1: knee  · Pain Addressed 1: Pre-medicate for activity, Reposition    Patients cultural, spiritual, Druze conflicts given the current situation: no    Living Environment:  Patient lives in one story home with 1 [address]RISTY and no rails.  Patient lives with his daughter.  Patient was ambulating recently without an assistive device but recently was using a rollator.  Patient has a tub/shower and was performing all ADLs independently.   DME owned (not currently used): transfer tub bench.  Upon discharge, patient will have  assistance from daughter.    Objective:     Communicated with RN prior to session.  Patient found supine with perineural catheter, peripheral IV, blood pressure cuff, cryotherapy, FCD  upon PT entry to room.    General Precautions: Standard, fall   Orthopedic Precautions:RLE weight bearing as tolerated   Braces: N/A     Exams:  · Cognitive Exam:  Patient is oriented to Person, Place, Time and Situation  · RLE ROM: mild post-surgical decrease in motion.  · RLE Strength: post-surgical deficits  · LLE ROM: WFL  · LLE Strength: WFL    Functional Mobility:  · Bed Mobility:     · Supine to Sit: stand by assistance  · Transfers:     · Sit to Stand:  stand by assistance with rolling walker  · Gait: 50 ft with rolling walker, step to gait pattern and SBA.  · Stairs:  Pt ascended/descended 1 stair(s) with No Assistive Device with bilateral handrails with Contact Guard Assistance.     Patient Education:    Patient and Family member educated on bed mobility training, car transfers, Fall risk, gait training, home safety, Home exercise program, TKA protocol and Weight bearing restrictions by explanation and demonstration.  Patient was receptive to education and verbalizes understanding.     AM-PAC 6 CLICK MOBILITY  Total Score:18     Patient left up in chair with all lines intact, daughter present.    GOALS:   Multidisciplinary Problems     Physical Therapy Goals     Not on file          Multidisciplinary Problems (Resolved)        Problem: Physical Therapy Goal    Goal Priority Disciplines Outcome Goal Variances Interventions   Physical Therapy Goal   (Resolved)     PT, PT/OT Outcome(s) achieved                     History:     Past Medical History:   Diagnosis Date    A-fib     Alcohol abuse     Anxiety     Arthritis     Chronic gout     Diabetes mellitus     DM (diabetes mellitus) 11/16/2016    Fatty liver     Hyperlipidemia     Renal cell cancer 2014       Past Surgical History:   Procedure Laterality Date     ABSCESS DRAINAGE      perirectal    COLONOSCOPY      CRYOTHERAPY, NERVE, PERIPHERAL, PERCUTANEOUS, USING LIQUID NITROUS OXIDE IN CLOSED NEEDLE DEVICE-right knee iovera Right 6/17/2019    Performed by [facility] at [facility] CATH LAB    FISTULOTOMY N/A 5/19/2015    Performed by Shane Hughes MD at [facility] OR 2ND FLR    HAND SURGERY Left     HEMORRHOIDECTOMY N/A 5/19/2015    Performed by Shane Hughes MD at [facility] OR 2ND FLR    KIDNEY SURGERY      partial left kidney removal - [place]    PARTIAL NEPHRECTOMY Left August 2014    ROBOTIC ASSISTED LAPAROSCOPIC NEPHRECTOMY PARTIAL Left 8/28/2014    Performed by Fabian Estevez MD at [facility] OR 2ND FLR       Time Tracking:     PT Received On: 06/27/19  PT Start Time: 1629     PT Stop Time: 1649  PT Total Time (min): 20 min     Billable Minutes: Evaluation 10 min and Gait Training 10 min      Archie Tapia, PT  06/27/2019

## 2019-06-27 NOTE — ANESTHESIA POSTPROCEDURE EVALUATION
Anesthesia Post Evaluation    Patient: Donald Warren Abadie    Procedure(s) Performed: Procedure(s) (LRB):  ARTHROPLASTY, KNEE, TOTAL-SAME DAY (Right)    Final Anesthesia Type: spinal  Patient location during evaluation: PACU  Patient participation: Yes- Able to Participate  Level of consciousness: awake and alert  Post-procedure vital signs: reviewed and stable  Pain management: adequate  Airway patency: patent  PONV status at discharge: No PONV  Anesthetic complications: no      Cardiovascular status: blood pressure returned to baseline  Respiratory status: unassisted  Hydration status: euvolemic  Follow-up not needed.          Vitals Value Taken Time   /76 6/27/2019  4:17 PM   Temp 36.3 °C (97.4 °F) 6/27/2019  2:00 PM   Pulse 64 6/27/2019  4:20 PM   Resp 23 6/27/2019  4:20 PM   SpO2 99 % 6/27/2019  4:20 PM   Vitals shown include unvalidated device data.      No case tracking events are documented in the log.      Pain/French Score: Pain Rating Prior to Med Admin: 8 (6/27/2019  3:46 PM)  French Score: 8 (6/27/2019  2:00 PM)

## 2019-06-27 NOTE — PLAN OF CARE
Problem: Physical Therapy Goal  Goal: Physical Therapy Goal  Outcome: Outcome(s) achieved Date Met: 06/27/19  Patient evaluated and demonstrated appropriate and safe ambulation and stair climbing.  Patient appropriate to discharge home with outpatient PT.

## 2019-06-27 NOTE — HOSPITAL COURSE
On 6/27/19, the patient arrived to the [facility] for proper pre-operative management.  Upon completion of pre-operative preparation, the patient was taken back to the operative theatre.  A right TKA was performed without complication and the patient was transported to the post anesthesia care unit in stable condition.  After appropriate recovery from the anaesthetic agents used during the surgery, the patient was then transported to the hospital inpatient floor.  The interim of the hospital stay from arrival on the floor up to discharge has been uncomplicated. The patient has tolerated regular diet with no nausea or vomiting.  The patient's pain has been controlled using a multimodal approach with the help of the anesthesia pain service. Currently, the patient's pain is well controlled on an oral regimen.  The patient has been voiding without difficulty.  The patient began participation in physical therapy after surgery and has progressed throughout the entire hospital stay.  Currently, the physical therapy team feels that the patient's progress is sufficient to allow the patient to be discharged to home safely.  The patient agrees with this assessment and desires a discharge today.

## 2019-06-27 NOTE — HPI
Donald Warren Abadie is a 63 y.o. male with a history of Right knee pain. Pain is worse with activity and weight bearing.  Patient has experienced interference of activities of daily living due to decreased range of motion and an increase in joint pain and swelling.  Patient has failed non-operative treatment including NSAIDs, corticosteroid injections, viscosupplement injections, and activity modification.  Donald Warren Abadie currently ambulates independently.      Relevant medical conditions of significance in perioperative period:  Alcoholism- in therapy and abstinent since 3 months ago  A-fib- on medication managed by cardiology  Gout- on medication managed by pcp

## 2019-06-28 ENCOUNTER — TELEPHONE (OUTPATIENT)
Dept: ORTHOPEDICS | Facility: CLINIC | Age: 64
End: 2019-06-28

## 2019-06-28 ENCOUNTER — ANTI-COAG VISIT (OUTPATIENT)
Dept: CARDIOLOGY | Facility: CLINIC | Age: 64
End: 2019-06-28

## 2019-06-28 DIAGNOSIS — Z96.651 S/P TKR (TOTAL KNEE REPLACEMENT), RIGHT: Primary | ICD-10-CM

## 2019-06-28 NOTE — PROGRESS NOTES
Attempted to call both numbers regarding PNC infusion. No answer. Voicemail left to call with any issues regarding PNC and that PNC should be removed to tomorrow. Will follow tomorrow regarding catheter removal.    Annetta Barnett MD  Anesthesia

## 2019-06-28 NOTE — PROGRESS NOTES
Pt's daughter has educated on situations that would require placing a call to the [facility]madin Clinic, including bleeding or unusual bruising issues, changes in health, diet or medications,upcoming procedures that require warfarin interruption, and missed Coumadin dose(s). Patient expressed understanding that avoidance of consistency with these parameters could cause fluctuations in INR, leading to more frequent visits and increase risk of adverse events.  Instructed pt's daughter to avoid high-moderate vit k containing products & alcohol.  Pt is not receiving home health so they have been scheduled at IM lab at their preference.  They are aware of stop date of coumadin & that approx. 2 labs maybe required next week.  Daughter understood and notified me that pt did have spinach w/ his coumadin today which will not happen in the future.

## 2019-06-28 NOTE — PLAN OF CARE
Discharge instructions reviewed with pt and daughter. Understanding verbalized. No complaints of pain reported. Pt able to tolerate po intake and urinate in restroom. To be transported to car by PCT.

## 2019-06-28 NOTE — PROGRESS NOTES
TKA with Dr. Huerta 6/27. Other PMH significant for atrial fibrillation, EtOH abuse (abstinent x3 mo), arthritis, gout, DM, HLD, fatty liver, h/o renal cell cancer.    Patient was previously on apixaban and Ortho plan is to restart DOAC on 7/4.

## 2019-06-28 NOTE — TELEPHONE ENCOUNTER
----- Message from Rossi France MA sent at 6/28/2019  1:08 PM CDT -----  Contact: daughter - Chani       ----- Message -----  From: Jose Meade  Sent: 6/28/2019  12:41 PM  To: [facility] Staff    Needs Advice    Reason for call: daughter ask if patient need to take aspirin while on the coumadin        Communication Preference: Phone     Additional Information: n/a

## 2019-06-28 NOTE — TELEPHONE ENCOUNTER
Called Chani back and left a voicemail letting her know that he does not have aspirin ordered and he should not take it while taking coumadin.

## 2019-06-28 NOTE — DISCHARGE SUMMARY
[facility]  Orthopedics  Discharge Summary      Patient Name: Donald Warren Abadie  MRN: [id number]  Admission Date: 6/27/2019  Hospital Length of Stay: 1 days  Discharge Date and Time: 6/27/2019  Attending Physician: Mikael Huerta MD  Discharging Provider: Puneet Anderson MD  Primary Care Provider: Bacilio Larry MD    HPI:   Donald Warren Abadie is a 63 y.o. male with a history of Right knee pain. Pain is worse with activity and weight bearing.  Patient has experienced interference of activities of daily living due to decreased range of motion and an increase in joint pain and swelling.  Patient has failed non-operative treatment including NSAIDs, corticosteroid injections, viscosupplement injections, and activity modification.  Donald Warren Abadie currently ambulates independently.      Relevant medical conditions of significance in perioperative period:  Alcoholism- in therapy and abstinent since 3 months ago  A-fib- on medication managed by cardiology  Gout- on medication managed by pcp    Procedure(s) (LRB):  ARTHROPLASTY, KNEE, TOTAL-SAME DAY (Right)      Hospital Course:  On 6/27/19, the patient arrived to the [facility] for proper pre-operative management.  Upon completion of pre-operative preparation, the patient was taken back to the operative theatre.  A right TKA was performed without complication and the patient was transported to the post anesthesia care unit in stable condition.  After appropriate recovery from the anaesthetic agents used during the surgery, the patient was then transported to the hospital inpatient floor.  The interim of the hospital stay from arrival on the floor up to discharge has been uncomplicated. The patient has tolerated regular diet with no nausea or vomiting.  The patient's pain has been controlled using a multimodal approach with the help of the anesthesia pain service. Currently, the patient's pain is well controlled on an oral  regimen.  The patient has been voiding without difficulty.  The patient began participation in physical therapy after surgery and has progressed throughout the entire hospital stay.  Currently, the physical therapy team feels that the patient's progress is sufficient to allow the patient to be discharged to home safely.  The patient agrees with this assessment and desires a discharge today.          Significant Diagnostic Studies: No pertinent studies.    Pending Diagnostic Studies:     None        Final Active Diagnoses:    Diagnosis Date Noted POA    PRINCIPAL PROBLEM:  S/P TKR (total knee replacement), right [Z96.651] 06/27/2019 Not Applicable      Problems Resolved During this Admission:      Discharged Condition: good    Disposition: Home or Self Care    Follow Up:    Patient Instructions:      Ambulatory Referral to Anticoagulation Monitoring   Referral Priority: Routine Referral Type: Consultation   Referral Reason: Specialty Services Required   Requested Specialty: Cardiology   Number of Visits Requested: 1     Medications:  Reconciled Home Medications:      Medication List      START taking these medications    famotidine 20 MG tablet  Commonly known as:  PEPCID  Take 1 tablet (20 mg total) by mouth 2 (two) times daily.     ondansetron 8 MG tablet  Commonly known as:  ZOFRAN  Take 1 tablet (8 mg total) by mouth every 8 (eight) hours as needed for Nausea.     oxyCODONE-acetaminophen  mg per tablet  Commonly known as:  PERCOCET  Take 1 tablet by mouth every 4 (four) hours as needed for Pain.     STOOL SOFTENER 100 MG capsule  Generic drug:  docusate sodium  Take 1 capsule (100 mg total) by mouth 2 (two) times daily.     warfarin 2.5 MG tablet  Commonly known as:  COUMADIN  Take 1 tablet (2.5 mg total) by mouth Daily.        CHANGE how you take these medications    apixaban 5 mg Tab  Commonly known as:  ELIQUIS  Take 1 tablet (5 mg total) by mouth 2 (two) times daily.  Start taking on:  7/4/2019  What  changed:  These instructions start on 7/4/2019. If you are unsure what to do until then, ask your doctor or other care provider.        CONTINUE taking these medications    allopurinol 100 MG tablet  Commonly known as:  ZYLOPRIM  TAKE 2 TABLETS BY MOUTH DAILY     cyanocobalamin 1000 MCG tablet  Commonly known as:  VITAMIN B-12  Take 1 tablet (1,000 mcg total) by mouth once daily.     doxepin 10 MG capsule  Commonly known as:  SINEQUAN  Take 1 capsule (10 mg total) by mouth every evening.     fenofibrate 160 MG Tab  Take 1 tablet (160 mg total) by mouth every evening.     flecainide 100 MG Tab  Commonly known as:  TAMBOCOR     folic acid 1 MG tablet  Commonly known as:  FOLVITE  Take 1 tablet (1 mg total) by mouth once daily.     gabapentin 300 MG capsule  Commonly known as:  NEURONTIN  Take 1 capsule (300 mg total) by mouth every evening.     metoprolol tartrate 25 MG tablet  Commonly known as:  LOPRESSOR  Take 0.5 tablets (12.5 mg total) by mouth 2 (two) times daily.     mirtazapine 7.5 MG Tab  Commonly known as:  REMERON     multivitamin tablet  Commonly known as:  THERAGRAN  Take 1 tablet by mouth once daily.     omega-3 acid ethyl esters 1 gram capsule  Commonly known as:  LOVAZA  Take 2 g by mouth 2 (two) times daily.     pantoprazole 40 MG tablet  Commonly known as:  PROTONIX  Take 1 tablet (40 mg total) by mouth once daily.     ranitidine 150 MG capsule  Commonly known as:  ZANTAC  Take 150 mg by mouth 2 (two) times daily.     rosuvastatin 10 MG tablet  Commonly known as:  CRESTOR  Take 1 tablet (10 mg total) by mouth once daily.     thiamine 100 MG tablet  Take 1 tablet (100 mg total) by mouth once daily.     traZODone 50 MG tablet  Commonly known as:  DESYREL  Take 1-2 tablets ( mg total) by mouth every evening.        STOP taking these medications    aspirin 81 MG Chew            Puneet Anderson MD  Orthopedics  [facility]

## 2019-06-28 NOTE — DISCHARGE INSTRUCTIONS
PATIENT INSTRUCTIONS  POST-ANESTHESIA    IMMEDIATELY FOLLOWING SURGERY:  Do not drive or operate machinery for the first twenty four hours after surgery.  Do not make any important decisions for twenty four hours after surgery or while taking narcotic pain medications or sedatives.  If you develop intractable nausea and vomiting or a severe headache please notify your doctor immediately.    FOLLOW-UP:  Please make an appointment with your surgeon as instructed. You do not need to follow up with anesthesia unless specifically instructed to do so.    WOUND CARE INSTRUCTIONS (if applicable):  Keep a dry clean dressing on the anesthesia/puncture wound site if there is drainage.  Once the wound has quit draining you may leave it open to air.  Generally you should leave the bandage intact for twenty four hours unless there is drainage.  If the epidural site drains for more than 36-48 hours please call the anesthesia department.    QUESTIONS?:  Please feel free to call your physician or the hospital  if you have any questions, and they will be happy to assist you.         Recovery After Procedural Sedation (Adult)  You have been given medicine by vein to make you sleep during your surgery. This may have included both a pain medicine and sleeping medicine. Most of the effects have worn off. But you may still have some drowsiness for the next 6 to 8 hours.  Home care  Follow these guidelines when you get home:  · For the next 8 hours, you should be watched by a responsible adult. This person should make sure your condition is not getting worse.  · Don't drink any alcohol for the next 24 hours.  · Don't drive, operate dangerous machinery, or make important business or personal decisions during the next 24 hours.  Note: Your healthcare provider may tell you not to take any medicine by mouth for pain or sleep in the next 4 hours. These medicines may react with the medicines you were given in the hospital. This could  cause a much stronger response than usual.  Follow-up care  Follow up with your healthcare provider if you are not alert and back to your usual level of activity within 12 hours.  When to seek medical advice  Call your healthcare provider right away if any of these occur:  · Drowsiness gets worse  · Weakness or dizziness gets worse  · Repeated vomiting  · You can't be awakened   Date Last Reviewed: 10/18/2016  © 7994-4804 [employer]. [address] [place] [address], [place] [address]. All rights reserved. This information is not intended as a substitute for professional medical care. Always follow your healthcare professional's instructions.      Discharge Instructions: Using a Walker  Your healthcare provider as prescribed a walker for you. To use your walker, you need to learn a new gait, or way to walk. Your healthcare provider will tell you to use either a non-weight-bearing gait (putting no weight on one leg and foot) or a weight-bearing gait (putting weight on both legs and feet).  Guidelines for use  Tips for use include the following:   · Remove throw rugs, electrical cords, and anything else that may cause you to fall.  · Arrange your household to keep the items you need handy. Keep everything else out of the way.  · Use a backpack, milena pack, apron, or pockets to carry things so you keep your hands free.  Non-weight-bearing method  Steps for this method include:  · Hold your injured (weaker) foot off the floor.  · Lift the walker (roll it if youre using a wheeled walker).  · Move the walker forward about 12 inches.  · Support your weight on your hands.  · Swing your good (stronger) foot forward to the center of the walker.       Weight-bearing method  Steps for this method include:  · Roll the walker (lift it if youre using a walker without wheels).  · Move the walker forward about 12 inches.  · Step forward with your injured leg, new joint, or weaker side first.  · Use the walker to help you keep  your balance as you take the step.  · Bring your other foot forward to the center of the walker.       Date Last Reviewed: 8/1/2016  © 0228-4717 [employer]. [address] [place] [address], [place] [address]. All rights reserved. This information is not intended as a substitute for professional medical care. Always follow your healthcare professional's instructions.

## 2019-06-29 RX ORDER — TAMSULOSIN HYDROCHLORIDE 0.4 MG/1
0.8 CAPSULE ORAL DAILY
Qty: 60 CAPSULE | Refills: 11 | Status: SHIPPED | OUTPATIENT
Start: 2019-06-29 | End: 2020-02-28

## 2019-06-29 NOTE — PROGRESS NOTES
Contacted Kle[employer] Schuster [place] at home regarding his OnQ ball. Patient reports tolerable pain level, well controlled w/ supplemental PO meds. Patient states his daughter (who is a nurse) will remove his perineural catheter when she gets off of work this evening. I instructed the patient to call our number on the OnQ packet if they have any issues removing his perineural catheter. All questions answered, all concerns addressed during telephone conversation.      Raffi Glynn MD  [facility]  PGY-2

## 2019-07-02 ENCOUNTER — LAB VISIT (OUTPATIENT)
Dept: LAB | Facility: HOSPITAL | Age: 64
End: 2019-07-02
Payer: COMMERCIAL

## 2019-07-02 ENCOUNTER — ANTI-COAG VISIT (OUTPATIENT)
Dept: CARDIOLOGY | Facility: CLINIC | Age: 64
End: 2019-07-02
Payer: COMMERCIAL

## 2019-07-02 DIAGNOSIS — Z96.651 S/P TKR (TOTAL KNEE REPLACEMENT), RIGHT: ICD-10-CM

## 2019-07-02 LAB
INR PPP: 1 (ref 0.8–1.2)
PROTHROMBIN TIME: 10.5 SEC (ref 9–12.5)

## 2019-07-02 PROCEDURE — 85610 PROTHROMBIN TIME: CPT

## 2019-07-02 PROCEDURE — 93793 PR ANTICOAGULANT MGMT FOR PT TAKING WARFARIN: ICD-10-PCS | Mod: S$GLB,,,

## 2019-07-02 PROCEDURE — 36415 COLL VENOUS BLD VENIPUNCTURE: CPT

## 2019-07-02 PROCEDURE — 93793 ANTICOAG MGMT PT WARFARIN: CPT | Mod: S$GLB,,,

## 2019-07-02 NOTE — PROGRESS NOTES
INR at baseline. He is scheduled to resume eliquis on 7/4. Will adjust dose for today and tomorrow then patient can resume eliquis in AM 7/4. D/c from clinic

## 2019-07-03 NOTE — PROGRESS NOTES
daughter Chani was advised of coumadin instructions: today 7/03 -5mg and resume Eliquis tomorrow 7/04 in am and will be discharged from coumadin clinic, she verbalized understanding

## 2019-07-05 DIAGNOSIS — Z96.659 STATUS POST KNEE REPLACEMENT, UNSPECIFIED LATERALITY: Primary | ICD-10-CM

## 2019-07-05 RX ORDER — OXYCODONE AND ACETAMINOPHEN 10; 325 MG/1; MG/1
1 TABLET ORAL EVERY 4 HOURS PRN
Qty: 42 TABLET | Refills: 0 | Status: SHIPPED | OUTPATIENT
Start: 2019-07-05 | End: 2019-07-11

## 2019-07-11 ENCOUNTER — TELEPHONE (OUTPATIENT)
Dept: ORTHOPEDICS | Facility: CLINIC | Age: 64
End: 2019-07-11

## 2019-07-11 ENCOUNTER — OFFICE VISIT (OUTPATIENT)
Dept: ORTHOPEDICS | Facility: CLINIC | Age: 64
End: 2019-07-11
Payer: COMMERCIAL

## 2019-07-11 VITALS — SYSTOLIC BLOOD PRESSURE: 128 MMHG | HEART RATE: 65 BPM | DIASTOLIC BLOOD PRESSURE: 69 MMHG

## 2019-07-11 DIAGNOSIS — Z96.651 STATUS POST RIGHT KNEE REPLACEMENT: Primary | ICD-10-CM

## 2019-07-11 PROCEDURE — 99999 PR PBB SHADOW E&M-EST. PATIENT-LVL II: ICD-10-PCS | Mod: PBBFAC,,, | Performed by: NURSE PRACTITIONER

## 2019-07-11 PROCEDURE — 99024 POSTOP FOLLOW-UP VISIT: CPT | Mod: S$GLB,,, | Performed by: NURSE PRACTITIONER

## 2019-07-11 PROCEDURE — 99024 PR POST-OP FOLLOW-UP VISIT: ICD-10-PCS | Mod: S$GLB,,, | Performed by: NURSE PRACTITIONER

## 2019-07-11 PROCEDURE — 99999 PR PBB SHADOW E&M-EST. PATIENT-LVL II: CPT | Mod: PBBFAC,,, | Performed by: NURSE PRACTITIONER

## 2019-07-11 RX ORDER — OXYCODONE AND ACETAMINOPHEN 5; 325 MG/1; MG/1
1 TABLET ORAL EVERY 6 HOURS PRN
Qty: 20 TABLET | Refills: 0 | Status: SHIPPED | OUTPATIENT
Start: 2019-07-11 | End: 2020-03-05

## 2019-07-11 NOTE — TELEPHONE ENCOUNTER
Spoke with Mr.Abadie and the pt stated that he believes the weather will be pretty bad on tomorrow and would like to come in today for post op appt. I was able to accommodate the pt and offered him today at 2:45pm. Mr.Abadie accepted appt.

## 2019-07-11 NOTE — TELEPHONE ENCOUNTER
----- Message from Tacho Garcia sent at 7/11/2019  9:46 AM CDT -----  Contact: Self/ [phone]  Patient would like a call back to see if he can come in today for his post-op appt.

## 2019-07-11 NOTE — PROGRESS NOTES
Donald Warren Abadie presents for initial post-operative visit following a right total knee arthroplasty performed by Dr. Huerta on 6/27/2019. Tolerating pain medication well.      Exam:   Blood pressure 128/69, pulse 65.   Ambulating well with assistive device.  Incision is clean and dry without drainage or erythema. ROM:0-90    Initial post-operative radiographs reviewed today revealing a well fixed and aligned prosthesis.    A/P:  2 weeks s/p right total knee replacement  - The patient was advised to keep the incision clean and dry for the next 24 hours after which he may wash the area with antibacterial soap in the shower. Will not submerge until the incision is completely healed.   - Outpatient PT: ongoing at [facility]  - Continue aspirin for 1 month from surgery.  - Pain medication refilled with percocet 5/325mg  - Follow up in 4 weeks with me with xray. Pt will call clinic with problems/concerns.

## 2019-07-16 ENCOUNTER — TELEPHONE (OUTPATIENT)
Dept: ORTHOPEDICS | Facility: CLINIC | Age: 64
End: 2019-07-16

## 2019-07-16 NOTE — TELEPHONE ENCOUNTER
Returned call to patient's daughter Chani and will send the letter for appeal to [phone]. Will refill future pain medication with tramadol rather than percocet at daughter's request.

## 2019-07-16 NOTE — TELEPHONE ENCOUNTER
----- Message from María Elena Burciaga MA sent at 7/16/2019  9:48 AM CDT -----  Contact: Chani  Do you know what appeal they are talking about?  ----- Message -----  From: Sondra Suazo  Sent: 7/16/2019   8:33 AM  To: Hayley Baker Staff    Type:  Needs Medical Advice    Who Called:  Pt daughter Chani   Symptoms (please be specific):   How long has patient had these symptoms:    Pharmacy name and phone #:  Would the patient rather a call back or a response via [facility]?   Best Call Back Number:  [phone]  Additional Information:  Would like a call back need to speak with you about the  appeal

## 2019-07-21 DIAGNOSIS — G89.18 POST-OP PAIN: Primary | ICD-10-CM

## 2019-07-21 RX ORDER — HYDROCODONE BITARTRATE AND ACETAMINOPHEN 5; 325 MG/1; MG/1
1 TABLET ORAL EVERY 6 HOURS PRN
Qty: 30 TABLET | Refills: 0 | Status: SHIPPED | OUTPATIENT
Start: 2019-07-21 | End: 2019-07-31

## 2019-07-21 NOTE — PROGRESS NOTES
Pain medication refilled with norco. Starting to decrease from percocet at this point. Patient has a history of alcohol abuse and his daughter has expressed concern about him getting too much pain medication post operatively.

## 2019-07-22 ENCOUNTER — TELEPHONE (OUTPATIENT)
Dept: INTERNAL MEDICINE | Facility: CLINIC | Age: 64
End: 2019-07-22

## 2019-07-22 NOTE — TELEPHONE ENCOUNTER
----- Message from Ludmila Corado sent at 7/22/2019 10:56 AM CDT -----  Contact: Patient   Patient needs to discuss current medications he is taking. Patient is losing a great amount of hair and need to know if referral is needed to have this checked out.  Patient is requesting call back.     Please call and advise  Thank you

## 2019-07-23 DIAGNOSIS — L65.9 HAIR LOSS: Primary | ICD-10-CM

## 2019-07-26 ENCOUNTER — OFFICE VISIT (OUTPATIENT)
Dept: INTERNAL MEDICINE | Facility: CLINIC | Age: 64
End: 2019-07-26
Payer: COMMERCIAL

## 2019-07-26 VITALS
OXYGEN SATURATION: 97 % | SYSTOLIC BLOOD PRESSURE: 110 MMHG | DIASTOLIC BLOOD PRESSURE: 66 MMHG | WEIGHT: 179.88 LBS | HEART RATE: 61 BPM | TEMPERATURE: 98 F | BODY MASS INDEX: 28.91 KG/M2 | HEIGHT: 66 IN

## 2019-07-26 DIAGNOSIS — G89.18 POST-OP PAIN: ICD-10-CM

## 2019-07-26 DIAGNOSIS — E11.9 DIABETES MELLITUS TYPE 2, NONINSULIN DEPENDENT: Primary | ICD-10-CM

## 2019-07-26 DIAGNOSIS — L65.9 HAIR LOSS: ICD-10-CM

## 2019-07-26 DIAGNOSIS — R23.9 SKIN CHANGE: ICD-10-CM

## 2019-07-26 DIAGNOSIS — G89.18 POST-OP PAIN: Primary | ICD-10-CM

## 2019-07-26 DIAGNOSIS — F10.11 H/O ETOH ABUSE: ICD-10-CM

## 2019-07-26 DIAGNOSIS — I48.91 ATRIAL FIBRILLATION, UNSPECIFIED TYPE: ICD-10-CM

## 2019-07-26 DIAGNOSIS — L72.9 CYST OF SKIN: ICD-10-CM

## 2019-07-26 DIAGNOSIS — E78.5 HYPERLIPIDEMIA, UNSPECIFIED HYPERLIPIDEMIA TYPE: ICD-10-CM

## 2019-07-26 PROCEDURE — 99999 PR PBB SHADOW E&M-EST. PATIENT-LVL IV: ICD-10-PCS | Mod: PBBFAC,,, | Performed by: INTERNAL MEDICINE

## 2019-07-26 PROCEDURE — 3078F DIAST BP <80 MM HG: CPT | Mod: CPTII,S$GLB,, | Performed by: INTERNAL MEDICINE

## 2019-07-26 PROCEDURE — 99214 OFFICE O/P EST MOD 30 MIN: CPT | Mod: S$GLB,,, | Performed by: INTERNAL MEDICINE

## 2019-07-26 PROCEDURE — 3008F BODY MASS INDEX DOCD: CPT | Mod: CPTII,S$GLB,, | Performed by: INTERNAL MEDICINE

## 2019-07-26 PROCEDURE — 3078F PR MOST RECENT DIASTOLIC BLOOD PRESSURE < 80 MM HG: ICD-10-PCS | Mod: CPTII,S$GLB,, | Performed by: INTERNAL MEDICINE

## 2019-07-26 PROCEDURE — 3074F SYST BP LT 130 MM HG: CPT | Mod: CPTII,S$GLB,, | Performed by: INTERNAL MEDICINE

## 2019-07-26 PROCEDURE — 99214 PR OFFICE/OUTPT VISIT, EST, LEVL IV, 30-39 MIN: ICD-10-PCS | Mod: S$GLB,,, | Performed by: INTERNAL MEDICINE

## 2019-07-26 PROCEDURE — 3045F PR MOST RECENT HEMOGLOBIN A1C LEVEL 7.0-9.0%: CPT | Mod: CPTII,S$GLB,, | Performed by: INTERNAL MEDICINE

## 2019-07-26 PROCEDURE — 3074F PR MOST RECENT SYSTOLIC BLOOD PRESSURE < 130 MM HG: ICD-10-PCS | Mod: CPTII,S$GLB,, | Performed by: INTERNAL MEDICINE

## 2019-07-26 PROCEDURE — 3045F PR MOST RECENT HEMOGLOBIN A1C LEVEL 7.0-9.0%: ICD-10-PCS | Mod: CPTII,S$GLB,, | Performed by: INTERNAL MEDICINE

## 2019-07-26 PROCEDURE — 99999 PR PBB SHADOW E&M-EST. PATIENT-LVL IV: CPT | Mod: PBBFAC,,, | Performed by: INTERNAL MEDICINE

## 2019-07-26 PROCEDURE — 3008F PR BODY MASS INDEX (BMI) DOCUMENTED: ICD-10-PCS | Mod: CPTII,S$GLB,, | Performed by: INTERNAL MEDICINE

## 2019-07-26 RX ORDER — HYDROCODONE BITARTRATE AND ACETAMINOPHEN 5; 325 MG/1; MG/1
1 TABLET ORAL EVERY 6 HOURS PRN
Qty: 30 TABLET | Refills: 0 | OUTPATIENT
Start: 2019-07-26 | End: 2019-08-05

## 2019-07-26 NOTE — PROGRESS NOTES
CHIEF COMPLAINT:  Followup.    HISTORY OF PRESENT ILLNESS:  The patient is a 63-year-old gentleman who we last   saw in June for preoperative clearance for right knee replacement.  The patient   is doing well.  He is in rehab.  He does have a history of AFib, currently on   flecainide b.i.d.  The patient is taking metoprolol only if he has an episode of   atrial fibrillation.  He reports no further episodes as long as he takes   flecainide twice a day.  In regards to his diabetes, he is checking his blood   sugars every morning.  They are usually in the 120s to 130s.  Whenever he has   some dietary indiscretion, his blood sugars will go up to around 170-180.  He   checks his blood sugars daily.  If they are high, he will check them two to   three times a day.  His last A1c was 7.9.  He does have a history of alcohol   abuse in the past.  The patient stopped drinking.  He is on pain medication.  He   is quite well aware that this is a narcotic.  He has been warned by the people   at  as well as his daughter who is a nurse.    REVIEW OF SYSTEMS:  The patient reports no chest pain or shortness of breath.    He does report when he was drinking, he will get episodes of shortness of   breath.  He is not exercising currently because of his knee.  He is going to   rehab.  The patient reports he is still alcohol free.  The patient is concerned   about hair loss, which he attributed to gabapentin.  He also reports having a   cyst over the xiphoid.  He would like to have a skin check.    PHYSICAL EXAMINATION:  GENERAL APPEARANCE:  No acute distress.  HEENT:  Trachea is midline without JVD.  PULMONARY:  Good inspiratory, expiratory breath sounds are heard.  Lungs clear   to auscultation.  CARDIOVASCULAR:  S1, S2.  EXTREMITIES:  With trace edema on the left leg.  He has trace to 1+ in the   right.  The incision appears to be clean and is healing well.  ABDOMEN:  Nontender and nondistended without  hepatosplenomegaly.    ASSESSMENT:  1.  Status post right knee replacement.  2.  Non-insulin-dependent diabetes.  3.  AFib.  4.  Hyperlipidemia.  5.  History of alcohol abuse.    PLAN:  We will refer the patient to Dermatology.  The patient is to follow up   with us in three months.      ORLANDO/DEJON  dd: 07/26/2019 08:22:01 (CDT)  td: 07/27/2019 00:58:56 (CDT)  Doc ID   #[id number]  Job ID #[id number]    CC:

## 2019-07-29 ENCOUNTER — INITIAL CONSULT (OUTPATIENT)
Dept: DERMATOLOGY | Facility: CLINIC | Age: 64
End: 2019-07-29
Payer: COMMERCIAL

## 2019-07-29 DIAGNOSIS — L81.4 LENTIGO: ICD-10-CM

## 2019-07-29 DIAGNOSIS — D22.9 MULTIPLE BENIGN NEVI: ICD-10-CM

## 2019-07-29 DIAGNOSIS — L82.1 SEBORRHEIC KERATOSES: ICD-10-CM

## 2019-07-29 DIAGNOSIS — L64.9 ANDROGENETIC ALOPECIA: ICD-10-CM

## 2019-07-29 DIAGNOSIS — Z12.83 SCREENING EXAM FOR SKIN CANCER: Primary | ICD-10-CM

## 2019-07-29 PROCEDURE — 99999 PR PBB SHADOW E&M-EST. PATIENT-LVL II: ICD-10-PCS | Mod: PBBFAC,,, | Performed by: DERMATOLOGY

## 2019-07-29 PROCEDURE — 99202 PR OFFICE/OUTPT VISIT, NEW, LEVL II, 15-29 MIN: ICD-10-PCS | Mod: S$GLB,,, | Performed by: DERMATOLOGY

## 2019-07-29 PROCEDURE — 99202 OFFICE O/P NEW SF 15 MIN: CPT | Mod: S$GLB,,, | Performed by: DERMATOLOGY

## 2019-07-29 PROCEDURE — 99999 PR PBB SHADOW E&M-EST. PATIENT-LVL II: CPT | Mod: PBBFAC,,, | Performed by: DERMATOLOGY

## 2019-07-29 NOTE — LETTER
July 30, 2019      Bacilio Larry MD  [address]  [facility] [address]           [facility] - Dermatology  [address]  [facility] [address]  Phone: [phone]  Fax: [phone]          Patient: Donald Warren Abadie   MR Number: [id number]   YOB: 1955   Date of Visit: 7/29/2019       Dear Dr. Bacilio Larry:    Thank you for referring Donald Abadie to me for evaluation. Attached you will find relevant portions of my assessment and plan of care.    If you have questions, please do not hesitate to call me. I look forward to following Donald Abadie along with you.    Sincerely,    Marlena Cardona MD    Enclosure  CC:  No Recipients    If you would like to receive this communication electronically, please contact [email or web address] or [phone] to request more information on [employer] Link access.    For providers and/or their staff who would like to refer a patient to [facility], please contact us through our one-stop-shop provider referral line, [facility] at [phone].    If you feel you have received this communication in error or would no longer like to receive these types of communications, please e-mail [email or web address]

## 2019-07-30 NOTE — PROGRESS NOTES
Subjective:       Patient ID:  Donald Warren Abadie is a 63 y.o. male who presents for   Chief Complaint   Patient presents with    Skin Check     skin check of upper body. I have never had a skin cancer. I use suncreen when I go to the beach.     HPI  62 yo male here for UBSE; no personal hx of skin cancer.  Recent medical issues include alcoholism and cardiac arrest.  Feeling well today.  The patient denies any moles or growths of the skin that are rapidly growing, hurting, itching, bleeding, or changing colors.    He has also noted more hair loss recently.  Interested in recommendations for this. Taking Viviscal.    Review of Systems   Skin: Positive for wears hat. Negative for daily sunscreen use and activity-related sunscreen use.        Objective:    Physical Exam   Constitutional: He appears well-developed and well-nourished. No distress.   Neurological: He is alert and oriented to person, place, and time. He is not disoriented.   Psychiatric: He has a normal mood and affect.   Skin:   Areas Examined (abnormalities noted in diagram):   Scalp / Hair Palpated and Inspected  Head / Face Inspection Performed  Neck Inspection Performed  Chest / Axilla Inspection Performed  Abdomen Inspection Performed  Back Inspection Performed  RUE Inspected  LUE Inspection Performed                   Diagram Legend     Erythematous scaling macule/papule c/w actinic keratosis       Vascular papule c/w angioma      Pigmented verrucoid papule/plaque c/w seborrheic keratosis      Yellow umbilicated papule c/w sebaceous hyperplasia      Irregularly shaped tan macule c/w lentigo     1-2 mm smooth white papules consistent with Milia      Movable subcutaneous cyst with punctum c/w epidermal inclusion cyst      Subcutaneous movable cyst c/w pilar cyst      Firm pink to brown papule c/w dermatofibroma      Pedunculated fleshy papule(s) c/w skin tag(s)      Evenly pigmented macule c/w junctional nevus     Mildly variegated pigmented,  slightly irregular-bordered macule c/w mildly atypical nevus      Flesh colored to evenly pigmented papule c/w intradermal nevus       Pink pearly papule/plaque c/w basal cell carcinoma      Erythematous hyperkeratotic cursted plaque c/w SCC      Surgical scar with no sign of skin cancer recurrence      Open and closed comedones      Inflammatory papules and pustules      Verrucoid papule consistent consistent with wart     Erythematous eczematous patches and plaques     Dystrophic onycholytic nail with subungual debris c/w onychomycosis     Umbilicated papule    Erythematous-base heme-crusted tan verrucoid plaque consistent with inflamed seborrheic keratosis     Erythematous Silvery Scaling Plaque c/w Psoriasis     See annotation      Assessment / Plan:        Screening exam for skin cancer    Upper body skin examination performed today including at least 6 points as noted in physical examination. No lesions suspicious for malignancy noted.      Androgenetic alopecia  Advise OTC rogaine 5% mens' x 6 mo trial  Handout given re: supplements for hair growth  Caution with biotin given that he is a cardiac patient and may affect cardiac enzyme tests      The following benign skin lesions were found on today's skin exam and do not require treatment:    Seborrheic keratoses    Lentigo    Multiple benign nevi                   Follow up if symptoms worsen or fail to improve.

## 2019-07-31 ENCOUNTER — PATIENT MESSAGE (OUTPATIENT)
Dept: ELECTROPHYSIOLOGY | Facility: CLINIC | Age: 64
End: 2019-07-31

## 2019-07-31 DIAGNOSIS — I48.0 PAROXYSMAL ATRIAL FIBRILLATION: Primary | ICD-10-CM

## 2019-07-31 DIAGNOSIS — I48.3 TYPICAL ATRIAL FLUTTER: ICD-10-CM

## 2019-08-02 ENCOUNTER — OFFICE VISIT (OUTPATIENT)
Dept: CARDIOLOGY | Facility: CLINIC | Age: 64
End: 2019-08-02
Payer: COMMERCIAL

## 2019-08-02 ENCOUNTER — TELEPHONE (OUTPATIENT)
Dept: ELECTROPHYSIOLOGY | Facility: CLINIC | Age: 64
End: 2019-08-02

## 2019-08-02 VITALS
HEART RATE: 63 BPM | BODY MASS INDEX: 27.67 KG/M2 | SYSTOLIC BLOOD PRESSURE: 101 MMHG | HEIGHT: 66 IN | DIASTOLIC BLOOD PRESSURE: 60 MMHG | WEIGHT: 172.19 LBS

## 2019-08-02 DIAGNOSIS — I46.9 CARDIAC ARREST: ICD-10-CM

## 2019-08-02 DIAGNOSIS — I48.0 PAROXYSMAL ATRIAL FIBRILLATION: Primary | ICD-10-CM

## 2019-08-02 DIAGNOSIS — E78.1 HYPERTRIGLYCERIDEMIA: ICD-10-CM

## 2019-08-02 DIAGNOSIS — Z96.651 S/P TKR (TOTAL KNEE REPLACEMENT), RIGHT: ICD-10-CM

## 2019-08-02 DIAGNOSIS — F10.21 ALCOHOL USE DISORDER, SEVERE, IN SUSTAINED REMISSION: ICD-10-CM

## 2019-08-02 PROCEDURE — 99999 PR PBB SHADOW E&M-EST. PATIENT-LVL IV: CPT | Mod: PBBFAC,,, | Performed by: INTERNAL MEDICINE

## 2019-08-02 PROCEDURE — 3008F PR BODY MASS INDEX (BMI) DOCUMENTED: ICD-10-PCS | Mod: CPTII,S$GLB,, | Performed by: INTERNAL MEDICINE

## 2019-08-02 PROCEDURE — 3008F BODY MASS INDEX DOCD: CPT | Mod: CPTII,S$GLB,, | Performed by: INTERNAL MEDICINE

## 2019-08-02 PROCEDURE — 3074F PR MOST RECENT SYSTOLIC BLOOD PRESSURE < 130 MM HG: ICD-10-PCS | Mod: CPTII,S$GLB,, | Performed by: INTERNAL MEDICINE

## 2019-08-02 PROCEDURE — 3078F PR MOST RECENT DIASTOLIC BLOOD PRESSURE < 80 MM HG: ICD-10-PCS | Mod: CPTII,S$GLB,, | Performed by: INTERNAL MEDICINE

## 2019-08-02 PROCEDURE — 3078F DIAST BP <80 MM HG: CPT | Mod: CPTII,S$GLB,, | Performed by: INTERNAL MEDICINE

## 2019-08-02 PROCEDURE — 3074F SYST BP LT 130 MM HG: CPT | Mod: CPTII,S$GLB,, | Performed by: INTERNAL MEDICINE

## 2019-08-02 PROCEDURE — 99999 PR PBB SHADOW E&M-EST. PATIENT-LVL IV: ICD-10-PCS | Mod: PBBFAC,,, | Performed by: INTERNAL MEDICINE

## 2019-08-02 PROCEDURE — 99214 OFFICE O/P EST MOD 30 MIN: CPT | Mod: S$GLB,,, | Performed by: INTERNAL MEDICINE

## 2019-08-02 PROCEDURE — 99214 PR OFFICE/OUTPT VISIT, EST, LEVL IV, 30-39 MIN: ICD-10-PCS | Mod: S$GLB,,, | Performed by: INTERNAL MEDICINE

## 2019-08-02 RX ORDER — FLECAINIDE ACETATE 100 MG/1
100 TABLET ORAL EVERY 12 HOURS
Qty: 180 TABLET | Refills: 3 | Status: SHIPPED | OUTPATIENT
Start: 2019-08-02 | End: 2019-12-18 | Stop reason: SDUPTHER

## 2019-08-02 RX ORDER — ROSUVASTATIN CALCIUM 20 MG/1
20 TABLET, COATED ORAL DAILY
Qty: 90 TABLET | Refills: 3 | Status: SHIPPED | OUTPATIENT
Start: 2019-08-02 | End: 2019-11-18 | Stop reason: SDUPTHER

## 2019-08-02 RX ORDER — METOPROLOL TARTRATE 25 MG/1
12.5 TABLET, FILM COATED ORAL 2 TIMES DAILY
Qty: 60 TABLET | Refills: 3 | Status: SHIPPED | OUTPATIENT
Start: 2019-08-02 | End: 2020-01-15 | Stop reason: SDUPTHER

## 2019-08-02 NOTE — TELEPHONE ENCOUNTER
----- Message from Es Patel sent at 8/2/2019  1:44 PM CDT -----  Contact: pt  Frida pt would like a call to cancel his surgery. Please call him at the number listed.    Thanks

## 2019-08-02 NOTE — PROGRESS NOTES
Subjective:   Chief Complaint: Paroxysmal atrial fibrillation (3 months fu)    Last Clinic Visit: 3/4/2019     History of Present Illness: Donald Warren Abadie is a 63 y.o. gentleman with paroxysmal atrial fibrillation, alcohol abuse, hypertension, hypertriglyceridemia, elevated LFTs, status post total knee replacement, who comes in for follow-up.  Interval History:  He had an episode approximately one-2 weeks after we saw him where he felt his blood pressure being elevated, and took several metoprolol, on top of diltiazem which we had recently started, and ultimately developed hypotension was admitted to the ER found to have bradycardia, and then had hypotensive PEA cardiac arrest, quick return of sinus rhythm, and ultimately improved.  It was felt that rest was in the setting of multiple medications, alcohol.  Since that time he stops drinking as has been doing very well with this.  No recurrent cardiac arrest.  AFib burden has dramatically decreased now that he no longer drinks.  Medication-wise he states he is currently taking flecainide 100 b.i.d. along with metoprolol p.r.n. with episodes of AFib.  He has an episode of AFib every few weeks.  He is also dealing with pain from a recent total knee replacement 3 weeks ago.  Attending cardiac rehab, denies any fevers or infections, but significant pain secondary to this.  We decreased his Crestor from 20 mg to 10 mg with an LDL of 11 and some mild transaminitis, repeat lipids in June showed that LDL went from 11 to 110.  No chest pain.    3/4/2019  He has a several year history of paroxysmal atrial fibrillation, very symptomatic, with palpitations, tachycardia, and has been seen in the ER in the past for it.  He is followed with Dr. Giang who he saw last year, and discussed antiarrhythmic therapy versus ablation, elected to pursue antiarrhythmic therapy given severe symptomatic nature of atrial fibrillation at that time.  Also with borderline age, elected to  pursue anticoagulation, and currently on apixaban 5 b.i.d., he reports tolerating anticoagulation well, denies any falls, no bleeding, no hematemesis, no hematochezia.  Given alcohol abuse, discussed treating this prior to ablation.  From atrial fibrillation standpoint he reports approximately 1 episode a month, which last anywhere from 15 min to several hours.  He currently is on flecainide 100 b.i.d., and reports compliance with this medication, but also is on metoprolol succinate 25 daily, and only reports intermittent compliance with this medication due to fatigue.  In addition to succinate 25 daily he also has a prescription for 25 tartrate p.r.n., which he takes when he goes into episodes of atrial fibrillation.     He reports a persistent difficult to control hypertriglyceridemia, and currently on Crestor 20 which was recently increased as well as Lovaza and fenofibrate.  Most recent LDL 11.    He previously has discussed detox therapy with Psychiatry, however they elected for inpatient rehab given cardiac issues, and he declined.    PMHx:  Paroxysmal Atrial Fibrillation  EtOH abuse  Hypertriglyceridemia  Elevated LFTs  Osteoarthritis status post TKR    Review of Systems   Constitution: Negative for malaise/fatigue.   HENT: Negative.    Eyes: Negative.    Cardiovascular: Positive for palpitations. Negative for irregular heartbeat.   Respiratory: Negative.    Hematologic/Lymphatic: Negative.    Skin: Negative.    Musculoskeletal: Positive for arthritis and joint pain.   Gastrointestinal: Negative.    Genitourinary: Negative.        Medications:  Current Outpatient Medications on File Prior to Visit   Medication Sig    allopurinol (ZYLOPRIM) 100 MG tablet TAKE 2 TABLETS BY MOUTH DAILY    cyanocobalamin (VITAMIN B-12) 1000 MCG tablet Take 1 tablet (1,000 mcg total) by mouth once daily.    doxepin (SINEQUAN) 10 MG capsule Take 1 capsule (10 mg total) by mouth every evening.    famotidine (PEPCID) 20 MG tablet  Take 1 tablet (20 mg total) by mouth 2 (two) times daily.    fenofibrate 160 MG Tab Take 1 tablet (160 mg total) by mouth every evening.    folic acid (FOLVITE) 1 MG tablet Take 1 tablet (1 mg total) by mouth once daily.    gabapentin (NEURONTIN) 300 MG capsule Take 1 capsule (300 mg total) by mouth every evening.    mirtazapine (REMERON) 7.5 MG Tab     multivitamin (THERAGRAN) tablet Take 1 tablet by mouth once daily.    omega-3 acid ethyl esters (LOVAZA) 1 gram capsule Take 2 g by mouth 2 (two) times daily.    ondansetron (ZOFRAN) 8 MG tablet Take 1 tablet (8 mg total) by mouth every 8 (eight) hours as needed for Nausea.    oxyCODONE-acetaminophen (PERCOCET) 5-325 mg per tablet Take 1 tablet by mouth every 6 (six) hours as needed for Pain.    pantoprazole (PROTONIX) 40 MG tablet Take 1 tablet (40 mg total) by mouth once daily.    ranitidine (ZANTAC) 150 MG capsule Take 150 mg by mouth 2 (two) times daily.    tamsulosin (FLOMAX) 0.4 mg Cap Take 2 capsules (0.8 mg total) by mouth once daily.    thiamine 100 MG tablet Take 1 tablet (100 mg total) by mouth once daily.    traZODone (DESYREL) 50 MG tablet Take 1-2 tablets ( mg total) by mouth every evening.    [DISCONTINUED] apixaban (ELIQUIS) 5 mg Tab Take 1 tablet (5 mg total) by mouth 2 (two) times daily.    [DISCONTINUED] flecainide (TAMBOCOR) 100 MG Tab     [DISCONTINUED] metoprolol tartrate (LOPRESSOR) 25 MG tablet Take 0.5 tablets (12.5 mg total) by mouth 2 (two) times daily.    [DISCONTINUED] rosuvastatin (CRESTOR) 10 MG tablet Take 1 tablet (10 mg total) by mouth once daily.     No current facility-administered medications on file prior to visit.      Family History:  Kleber's family history includes Cancer in his father; Colon polyps in his brother; Diabetes in his mother; Lung cancer in his father and sister.    Social History:  Kleber reports that he has never smoked. He has never used smokeless tobacco. He reports that he drank  "alcohol. He reports that he does not use drugs.    Objective:   /60 (BP Location: Left arm, Patient Position: Sitting, BP Method: X-Large (Automatic))   Pulse 63   Ht 5' 6" (1.676 m)   Wt 78.1 kg (172 lb 2.9 oz)   BMI 27.79 kg/m²     Physical Exam   Constitutional: He is oriented to person, place, and time and well-developed, well-nourished, and in no distress. No distress.   HENT:   Head: Normocephalic and atraumatic.   Mouth/Throat: No oropharyngeal exudate.   Eyes: EOM are normal. No scleral icterus.   Neck: No JVD present. No tracheal deviation present. No thyromegaly present.   Cardiovascular: Normal rate and regular rhythm. Exam reveals no gallop and no friction rub.   No murmur heard.  Pulmonary/Chest: Effort normal and breath sounds normal. No respiratory distress. He has no wheezes. He has no rales. He exhibits no tenderness.   Abdominal: Soft. He exhibits no distension. There is no tenderness. There is no rebound and no guarding.   Musculoskeletal: He exhibits edema (Mild around right knee).   Incision noted over right knee from prior knee replacement, no surrounding erythema.   Neurological: He is alert and oriented to person, place, and time.   Skin: Skin is warm and dry. He is not diaphoretic. No erythema.   Facial plethora   Psychiatric: Affect normal.     EKG:  My independent visualization of most recent EKG is normal sinus rhythm, nonspecific ST changes, borderline left atrial enlargement    TTE:  10/12/2018  CONCLUSIONS     1 - Normal left ventricular systolic function (EF 60-65%).     2 - Biatrial enlargement.     3 - No wall motion abnormalities.     4 - Quantitatively measured LV function is 67%.     5 - Indeterminate LV diastolic function.     6 - Normal right ventricular systolic function .     7 - The estimated PA systolic pressure is greater than 24 mmHg.     8 - Mild tricuspid regurgitation.   03/15/2019  · Low normal left ventricular systolic function. The estimated ejection " fraction is 50%  · Concentric left ventricular remodeling.  · Septal wall has abnormal motion.  · RV was not well visualized  · Mild tricuspid regurgitation.  · Atrial fibrillation observed.   Lipids:  Recent Labs   Lab 06/21/19  1233   LDL Cholesterol 115.4   HDL 28 L   Cholesterol 167      Holter 08/01/2018  PREDOMINANT RHYTHM  1. Sinus rhythm with heart rates varying between 44 and 82 bpm with an average of 55 bpm.     VENTRICULAR ARRHYTHMIAS  1. There were very rare PVCs recorded totalling 41 and averaging less than 1 per hour.     2. There were no episodes of ventricular tachycardia.    SUPRA VENTRICULAR ARRHYTHMIAS  1. There were very rare PACs recorded totalling 15 and averaging less than 1 per hour.  There were 3 couplets.    2. There were no episodes of sustained supraventricular tachycardia.        Assessment:     1. Paroxysmal atrial fibrillation    2. Hypertriglyceridemia    3. S/P TKR (total knee replacement), right    4. Alcohol use disorder, severe, in sustained remission    5. Cardiac arrest        Plan:   1. Paroxysmal atrial fibrillation  Continue flecainide as he is currently taking at 100 b.i.d., hesitant to add diltiazem back given recent cardiac arrest in March.  Typically would have him on a long-term AV cornel blocker as well, but will defer to Dr. Giang on this, he is currently taking only p.r.n. for episodes of AFib.  Continue Eliquis for borderline chads Vasc.  He is requesting to delay PVI at this time given recovery from knee surgery  - flecainide (TAMBOCOR) 100 MG Tab; Take 1 tablet (100 mg total) by mouth every 12 (twelve) hours.  Dispense: 180 tablet; Refill: 3  - apixaban (ELIQUIS) 5 mg Tab; Take 1 tablet (5 mg total) by mouth 2 (two) times daily.  Dispense: 60 tablet; Refill: 11  - metoprolol tartrate (LOPRESSOR) 25 MG tablet; Take 0.5 tablets (12.5 mg total) by mouth 2 (two) times daily.  Dispense: 60 tablet; Refill: 3    2. Hypertriglyceridemia  Most recent LDL dramatically  worsened with decreased dose of Crestor will increase back to 20 mg.  Follow-up lipid panel and CMP in 6 weeks  - rosuvastatin (CRESTOR) 20 MG tablet; Take 1 tablet (20 mg total) by mouth once daily.  Dispense: 90 tablet; Refill: 3  - Lipid panel; Future  - Comprehensive metabolic panel; Future    3. S/P TKR (total knee replacement), right  Encouraged continued follow-up with rehab, does not appear to be infected at this time    4. Alcohol use disorder, severe, in sustained remission  Encouraged continued remission   5. Cardiac arrest  Will avoid dilt    No follow-ups on file.

## 2019-08-02 NOTE — Clinical Note
He quit drinking a few months ago with cardiac arrest, and reports dramatic improvement in AFib burden.  He is continuing to take flecainide 100 b.i.d., but is only taking metoprolol p.r.n. with episodes of AFib. (cardiac arrest was in the setting of bradycardia sounds like, with metoprolol/diltiazem confusion, along with alcohol intoxication) I wanted to run that by you and make sure your okay with flecainide with only p.r.n. AV cornel block agent.  He also wants to hold off on his PVI at this time given recent knee surgery, and decreased AFib burden now that he no longer drinks.Shira

## 2019-08-02 NOTE — TELEPHONE ENCOUNTER
Spoke with patient. He recently had knee surgery and is having problems. He needs to get better before having the PVI. He will call me when ready to reschedule. He understands that Dr Giang is already scheduled out through the end of September.

## 2019-08-08 ENCOUNTER — HOSPITAL ENCOUNTER (OUTPATIENT)
Dept: RADIOLOGY | Facility: HOSPITAL | Age: 64
Discharge: HOME | End: 2019-08-08
Attending: NURSE PRACTITIONER
Payer: COMMERCIAL

## 2019-08-08 ENCOUNTER — OFFICE VISIT (OUTPATIENT)
Dept: ORTHOPEDICS | Facility: CLINIC | Age: 64
End: 2019-08-08
Payer: COMMERCIAL

## 2019-08-08 VITALS — WEIGHT: 172.19 LBS | BODY MASS INDEX: 27.79 KG/M2

## 2019-08-08 DIAGNOSIS — Z96.651 STATUS POST RIGHT KNEE REPLACEMENT: Primary | ICD-10-CM

## 2019-08-08 DIAGNOSIS — Z96.651 STATUS POST RIGHT KNEE REPLACEMENT: ICD-10-CM

## 2019-08-08 PROCEDURE — 73560 X-RAY EXAM OF KNEE 1 OR 2: CPT | Mod: TC,LT

## 2019-08-08 PROCEDURE — 73560 XR KNEE ORTHO RIGHT: ICD-10-PCS | Mod: 26,59,LT, | Performed by: RADIOLOGY

## 2019-08-08 PROCEDURE — 73562 X-RAY EXAM OF KNEE 3: CPT | Mod: TC,RT

## 2019-08-08 PROCEDURE — 99024 POSTOP FOLLOW-UP VISIT: CPT | Mod: S$GLB,,, | Performed by: NURSE PRACTITIONER

## 2019-08-08 PROCEDURE — 99999 PR PBB SHADOW E&M-EST. PATIENT-LVL II: ICD-10-PCS | Mod: PBBFAC,,, | Performed by: NURSE PRACTITIONER

## 2019-08-08 PROCEDURE — 73562 X-RAY EXAM OF KNEE 3: CPT | Mod: 26,RT,, | Performed by: RADIOLOGY

## 2019-08-08 PROCEDURE — 73562 XR KNEE ORTHO RIGHT: ICD-10-PCS | Mod: 26,RT,, | Performed by: RADIOLOGY

## 2019-08-08 PROCEDURE — 73560 X-RAY EXAM OF KNEE 1 OR 2: CPT | Mod: 26,59,LT, | Performed by: RADIOLOGY

## 2019-08-08 PROCEDURE — 99999 PR PBB SHADOW E&M-EST. PATIENT-LVL II: CPT | Mod: PBBFAC,,, | Performed by: NURSE PRACTITIONER

## 2019-08-08 PROCEDURE — 99024 PR POST-OP FOLLOW-UP VISIT: ICD-10-PCS | Mod: S$GLB,,, | Performed by: NURSE PRACTITIONER

## 2019-08-08 RX ORDER — HYDROCODONE BITARTRATE AND ACETAMINOPHEN 10; 325 MG/1; MG/1
1 TABLET ORAL EVERY 6 HOURS PRN
Qty: 30 TABLET | Refills: 0 | Status: SHIPPED | OUTPATIENT
Start: 2019-08-08 | End: 2019-08-15 | Stop reason: SDUPTHER

## 2019-08-09 NOTE — PROGRESS NOTES
Donald Warren Abadie presents for 6 week post-operative visit following a right total knee arthroplasty performed by Dr. Huerta on 6/27/2019. Tolerating pain medication well.      Exam:   Weight 78.1 kg (172 lb 2.9 oz).   Ambulating well without assistive device.  Incision is clean and dry without drainage or erythema. ROM:0-90 with difficulty    Initial post-operative radiographs reviewed today revealing a well fixed and aligned prosthesis.    A/P:  6 weeks s/p right total knee replacement.   - Outpatient PT: ongoing at [facility]  - Pain medication refilled with norco as tramadol is not giving him enough relief to do his physical therapy  - Follow up in 6 weeks with Dr. Huerta. Pt will call clinic with problems/concerns.

## 2019-08-12 ENCOUNTER — TELEPHONE (OUTPATIENT)
Dept: ORTHOPEDICS | Facility: CLINIC | Age: 64
End: 2019-08-12

## 2019-08-12 NOTE — TELEPHONE ENCOUNTER
----- Message from Meme Ware sent at 8/12/2019 12:59 PM CDT -----  Contact: Jazmine Coughlin  Called requesting a return call back regarding the progress note that was sent over on 08/07/19. Please contact facility at [phone]

## 2019-08-12 NOTE — TELEPHONE ENCOUNTER
Attempted to to contact Rose Marie, but did not get an answer. I did leave her a message stating that we had not received any progress notes on Mr.Abadie and also left the correct fax number the notes could be faxed to.

## 2019-08-15 DIAGNOSIS — Z96.651 STATUS POST RIGHT KNEE REPLACEMENT: ICD-10-CM

## 2019-08-15 RX ORDER — HYDROCODONE BITARTRATE AND ACETAMINOPHEN 10; 325 MG/1; MG/1
1 TABLET ORAL EVERY 6 HOURS PRN
Qty: 30 TABLET | Refills: 0 | Status: SHIPPED | OUTPATIENT
Start: 2019-08-15 | End: 2019-08-25

## 2019-08-25 DIAGNOSIS — Z96.659 STATUS POST KNEE REPLACEMENT, UNSPECIFIED LATERALITY: Primary | ICD-10-CM

## 2019-08-25 RX ORDER — HYDROCODONE BITARTRATE AND ACETAMINOPHEN 5; 325 MG/1; MG/1
1 TABLET ORAL EVERY 8 HOURS PRN
Qty: 20 TABLET | Refills: 0 | Status: SHIPPED | OUTPATIENT
Start: 2019-08-25 | End: 2019-09-04

## 2019-09-13 DIAGNOSIS — E11.9 DIABETES MELLITUS TYPE 2, NONINSULIN DEPENDENT: ICD-10-CM

## 2019-09-23 ENCOUNTER — OFFICE VISIT (OUTPATIENT)
Dept: ORTHOPEDICS | Facility: CLINIC | Age: 64
End: 2019-09-23
Payer: COMMERCIAL

## 2019-09-23 VITALS — BODY MASS INDEX: 28.61 KG/M2 | HEIGHT: 66 IN | WEIGHT: 178 LBS

## 2019-09-23 DIAGNOSIS — Z96.651 STATUS POST RIGHT KNEE REPLACEMENT: Primary | ICD-10-CM

## 2019-09-23 PROCEDURE — 99999 PR PBB SHADOW E&M-EST. PATIENT-LVL III: ICD-10-PCS | Mod: PBBFAC,,, | Performed by: ORTHOPAEDIC SURGERY

## 2019-09-23 PROCEDURE — 99024 PR POST-OP FOLLOW-UP VISIT: ICD-10-PCS | Mod: S$GLB,,, | Performed by: ORTHOPAEDIC SURGERY

## 2019-09-23 PROCEDURE — 99999 PR PBB SHADOW E&M-EST. PATIENT-LVL III: CPT | Mod: PBBFAC,,, | Performed by: ORTHOPAEDIC SURGERY

## 2019-09-23 PROCEDURE — 99024 POSTOP FOLLOW-UP VISIT: CPT | Mod: S$GLB,,, | Performed by: ORTHOPAEDIC SURGERY

## 2019-09-23 RX ORDER — DICLOFENAC SODIUM 10 MG/G
2 GEL TOPICAL DAILY
Qty: 1 TUBE | Refills: 3 | Status: SHIPPED | OUTPATIENT
Start: 2019-09-23 | End: 2020-01-06 | Stop reason: SDUPTHER

## 2019-09-23 NOTE — PROGRESS NOTES
Donald Warren Abadie is in for 3 month follow up for a  Right TKA.  He is doing fairly well.  Mild pain in the knee.  He has resumed activities of daily living. He has been in PT  Exam demonstrates  A well developed female in no distress.  Alert and oriented.  Mood and affect are appropriate.    Knee incision is well healed.  ROM is 5-95.  The patella tracks well and there is no instability. The extremity is neurovascularly intact.    Xrays demonstrate a well fixed and positioned prosthesis.      Imp:Progressing  Donald Warren Abadie was given a prescription for Voltaren gel.  The patient was given instructions on how to take the medication and side effects were discussed.    F/u in 6 weeks with xrays

## 2019-10-06 ENCOUNTER — HOSPITAL ENCOUNTER (EMERGENCY)
Facility: HOSPITAL | Age: 64
Discharge: HOME | End: 2019-10-06
Attending: EMERGENCY MEDICINE
Payer: MEDICARE

## 2019-10-06 VITALS
RESPIRATION RATE: 16 BRPM | TEMPERATURE: 98 F | DIASTOLIC BLOOD PRESSURE: 77 MMHG | BODY MASS INDEX: 28.12 KG/M2 | SYSTOLIC BLOOD PRESSURE: 129 MMHG | HEART RATE: 70 BPM | WEIGHT: 175 LBS | OXYGEN SATURATION: 98 % | HEIGHT: 66 IN

## 2019-10-06 DIAGNOSIS — R73.9 HYPERGLYCEMIA: ICD-10-CM

## 2019-10-06 DIAGNOSIS — E11.65 TYPE 2 DIABETES MELLITUS WITH HYPERGLYCEMIA, WITHOUT LONG-TERM CURRENT USE OF INSULIN: Primary | ICD-10-CM

## 2019-10-06 LAB
ALLENS TEST: ABNORMAL
BUN SERPL-MCNC: 15 MG/DL (ref 6–30)
CHLORIDE SERPL-SCNC: 93 MMOL/L (ref 95–110)
CREAT SERPL-MCNC: 0.9 MG/DL (ref 0.5–1.4)
DELSYS: ABNORMAL
GLUCOSE SERPL-MCNC: 216 MG/DL (ref 70–110)
HCO3 UR-SCNC: 32.3 MMOL/L (ref 24–28)
HCT VFR BLD CALC: 46 %PCV (ref 36–54)
MODE: ABNORMAL
PCO2 BLDA: 48.1 MMHG (ref 35–45)
PH SMN: 7.43 [PH] (ref 7.35–7.45)
PO2 BLDA: 28 MMHG (ref 40–60)
POC BE: 8 MMOL/L
POC IONIZED CALCIUM: 1.24 MMOL/L (ref 1.06–1.42)
POC SATURATED O2: 53 % (ref 95–100)
POC TCO2 (MEASURED): 33 MMOL/L (ref 23–29)
POC TCO2: 34 MMOL/L (ref 24–29)
POCT GLUCOSE: 203 MG/DL (ref 70–110)
POTASSIUM BLD-SCNC: 4.4 MMOL/L (ref 3.5–5.1)
SAMPLE: ABNORMAL
SAMPLE: ABNORMAL
SITE: ABNORMAL
SODIUM BLD-SCNC: 135 MMOL/L (ref 136–145)

## 2019-10-06 PROCEDURE — 99284 EMERGENCY DEPT VISIT MOD MDM: CPT | Mod: ,,, | Performed by: PHYSICIAN ASSISTANT

## 2019-10-06 PROCEDURE — 82962 GLUCOSE BLOOD TEST: CPT | Mod: HCNC

## 2019-10-06 PROCEDURE — 99283 EMERGENCY DEPT VISIT LOW MDM: CPT | Mod: 25,HCNC

## 2019-10-06 PROCEDURE — 99900035 HC TECH TIME PER 15 MIN (STAT): Mod: HCNC

## 2019-10-06 PROCEDURE — 99284 PR EMERGENCY DEPT VISIT,LEVEL IV: ICD-10-PCS | Mod: ,,, | Performed by: PHYSICIAN ASSISTANT

## 2019-10-06 PROCEDURE — 80047 BASIC METABLC PNL IONIZED CA: CPT | Mod: HCNC

## 2019-10-06 PROCEDURE — 82803 BLOOD GASES ANY COMBINATION: CPT | Mod: HCNC

## 2019-10-06 RX ORDER — METFORMIN HYDROCHLORIDE 500 MG/1
500 TABLET ORAL 2 TIMES DAILY WITH MEALS
Qty: 40 TABLET | Refills: 0 | Status: SHIPPED | OUTPATIENT
Start: 2019-10-06 | End: 2019-10-17

## 2019-10-07 ENCOUNTER — OFFICE VISIT (OUTPATIENT)
Dept: INTERNAL MEDICINE | Facility: CLINIC | Age: 64
End: 2019-10-07
Payer: MEDICARE

## 2019-10-07 VITALS
SYSTOLIC BLOOD PRESSURE: 112 MMHG | BODY MASS INDEX: 29.05 KG/M2 | WEIGHT: 180.75 LBS | DIASTOLIC BLOOD PRESSURE: 64 MMHG | HEIGHT: 66 IN | HEART RATE: 69 BPM | OXYGEN SATURATION: 98 %

## 2019-10-07 DIAGNOSIS — E11.69 HYPERLIPIDEMIA ASSOCIATED WITH TYPE 2 DIABETES MELLITUS: ICD-10-CM

## 2019-10-07 DIAGNOSIS — E11.65 TYPE 2 DIABETES MELLITUS WITH HYPERGLYCEMIA, WITHOUT LONG-TERM CURRENT USE OF INSULIN: ICD-10-CM

## 2019-10-07 DIAGNOSIS — E11.65 TYPE 2 DIABETES MELLITUS WITH HYPERGLYCEMIA, WITHOUT LONG-TERM CURRENT USE OF INSULIN: Primary | ICD-10-CM

## 2019-10-07 DIAGNOSIS — F41.9 ANXIETY: ICD-10-CM

## 2019-10-07 DIAGNOSIS — K75.81 STEATOHEPATITIS: ICD-10-CM

## 2019-10-07 DIAGNOSIS — E78.5 HYPERLIPIDEMIA ASSOCIATED WITH TYPE 2 DIABETES MELLITUS: ICD-10-CM

## 2019-10-07 PROCEDURE — 3288F FALL RISK ASSESSMENT DOCD: CPT | Mod: HCNC,CPTII,S$GLB, | Performed by: INTERNAL MEDICINE

## 2019-10-07 PROCEDURE — 3008F PR BODY MASS INDEX (BMI) DOCUMENTED: ICD-10-PCS | Mod: HCNC,CPTII,S$GLB, | Performed by: INTERNAL MEDICINE

## 2019-10-07 PROCEDURE — 99214 OFFICE O/P EST MOD 30 MIN: CPT | Mod: HCNC,S$GLB,, | Performed by: INTERNAL MEDICINE

## 2019-10-07 PROCEDURE — 1100F PR PT FALLS ASSESS DOC 2+ FALLS/FALL W/INJURY/YR: ICD-10-PCS | Mod: HCNC,CPTII,S$GLB, | Performed by: INTERNAL MEDICINE

## 2019-10-07 PROCEDURE — 3046F HEMOGLOBIN A1C LEVEL >9.0%: CPT | Mod: HCNC,CPTII,S$GLB, | Performed by: INTERNAL MEDICINE

## 2019-10-07 PROCEDURE — 3074F SYST BP LT 130 MM HG: CPT | Mod: HCNC,CPTII,S$GLB, | Performed by: INTERNAL MEDICINE

## 2019-10-07 PROCEDURE — 3046F PR MOST RECENT HEMOGLOBIN A1C LEVEL > 9.0%: ICD-10-PCS | Mod: HCNC,CPTII,S$GLB, | Performed by: INTERNAL MEDICINE

## 2019-10-07 PROCEDURE — 99999 PR PBB SHADOW E&M-EST. PATIENT-LVL III: CPT | Mod: PBBFAC,HCNC,, | Performed by: INTERNAL MEDICINE

## 2019-10-07 PROCEDURE — 3078F PR MOST RECENT DIASTOLIC BLOOD PRESSURE < 80 MM HG: ICD-10-PCS | Mod: HCNC,CPTII,S$GLB, | Performed by: INTERNAL MEDICINE

## 2019-10-07 PROCEDURE — 99214 PR OFFICE/OUTPT VISIT, EST, LEVL IV, 30-39 MIN: ICD-10-PCS | Mod: HCNC,S$GLB,, | Performed by: INTERNAL MEDICINE

## 2019-10-07 PROCEDURE — 3074F PR MOST RECENT SYSTOLIC BLOOD PRESSURE < 130 MM HG: ICD-10-PCS | Mod: HCNC,CPTII,S$GLB, | Performed by: INTERNAL MEDICINE

## 2019-10-07 PROCEDURE — 1100F PTFALLS ASSESS-DOCD GE2>/YR: CPT | Mod: HCNC,CPTII,S$GLB, | Performed by: INTERNAL MEDICINE

## 2019-10-07 PROCEDURE — 99999 PR PBB SHADOW E&M-EST. PATIENT-LVL III: ICD-10-PCS | Mod: PBBFAC,HCNC,, | Performed by: INTERNAL MEDICINE

## 2019-10-07 PROCEDURE — 3288F PR FALLS RISK ASSESSMENT DOCUMENTED: ICD-10-PCS | Mod: HCNC,CPTII,S$GLB, | Performed by: INTERNAL MEDICINE

## 2019-10-07 PROCEDURE — 3078F DIAST BP <80 MM HG: CPT | Mod: HCNC,CPTII,S$GLB, | Performed by: INTERNAL MEDICINE

## 2019-10-07 PROCEDURE — 3008F BODY MASS INDEX DOCD: CPT | Mod: HCNC,CPTII,S$GLB, | Performed by: INTERNAL MEDICINE

## 2019-10-07 RX ORDER — GLIPIZIDE 5 MG/1
5 TABLET ORAL DAILY PRN
Qty: 30 TABLET | Refills: 0 | Status: SHIPPED | OUTPATIENT
Start: 2019-10-07 | End: 2019-10-07 | Stop reason: SDUPTHER

## 2019-10-07 RX ORDER — GLIPIZIDE 5 MG/1
TABLET ORAL
Qty: 90 TABLET | Refills: 0 | Status: SHIPPED | OUTPATIENT
Start: 2019-10-07 | End: 2019-10-17

## 2019-10-07 NOTE — PROGRESS NOTES
Subjective:       Patient ID: Donald Warren Abadie is a 64 y.o. male.    Chief Complaint: Diabetes    HPI    Accompanied by son-in-law.    Patient of Bacilio Larry MD, here today for Urgent Care visit.  First visit with me.  Last visit with Internal Medicine July 2019, since then seen by Dermatology, Cardiology, Orthopedics.  Also had visit to the emergency room for hyperglycemia yesterday.  Patient also sees outpatient psychiatry for alcohol use disorder and anxiety.  Patient is on metformin alone for treatment of diabetes.  Patient does not have CKD.  Last hemoglobin A1c 7.9% in June.  Patient has appointment in 2 weeks with his PCP.    Patient reports hyperglycemia this morning with blood sugars in the 400s.  Yesterday he was in the emergency room because of blood sugars in the 500s and waking up the morning.  Patient recently restarted Remeron, which he reports causes significant food cravings.  This was prescribed by Psychiatry because of significant anxiety.  Patient then tried trazodone, felt fatigued but no less anxiety, also tried gabapentin, but reports this caused hair loss.  Restarted mirtazapine few days ago, but has stopped this due to the hyperglycemia.  Recently started metformin, prescribed yesterday for elevated blood sugars.    Review of Systems   All other systems reviewed and are negative.      Objective:      Physical Exam   Constitutional: No distress.   HENT:   Mouth/Throat: Oropharynx is clear and moist. No oropharyngeal exudate.   Neck: Normal range of motion. No thyromegaly present.   Cardiovascular: Normal rate, regular rhythm and normal heart sounds. Exam reveals no gallop and no friction rub.   No murmur heard.  Pulmonary/Chest: Effort normal and breath sounds normal. No stridor. He has no wheezes. He has no rales.   Abdominal: Soft. He exhibits no distension and no mass. Bowel sounds are increased. There is no tenderness. There is no guarding.   Lymphadenopathy:     He has no cervical  "adenopathy.   Neurological: He is alert. He displays no tremor.   Skin: Skin is warm and dry. He is not diaphoretic.   Psychiatric: He has a normal mood and affect. His behavior is normal.   Nursing note and vitals reviewed.      Vitals:    10/07/19 1813   BP: 112/64   Pulse: 69   SpO2: 98%   Weight: 82 kg (180 lb 12.4 oz)   Height: 5' 6" (1.676 m)     Body mass index is 29.18 kg/m².    RESULTS: Reviewed labs from last 12 months    Assessment:       1. Type 2 diabetes mellitus with hyperglycemia, without long-term current use of insulin    2. Steatohepatitis    3. Hyperlipidemia associated with type 2 diabetes mellitus    4. Anxiety        Plan:   Kleber was seen today for diabetes.    Diagnoses and all orders for this visit:    Type 2 diabetes mellitus with hyperglycemia, without long-term current use of insulin:  Prior dx, persists. Hyperglycemia should improve with changes to diet and stopping Remeron; if not I have started glipizide to use for severe blood sugars. Refer to diabetic educator. Check for signs of liver failure contributing to hyperglycemia.   -     Ambulatory Referral to Diabetes Education; Future  -     Comprehensive metabolic panel; Future  -     Hemoglobin A1c; Future  -     Magnesium; Future  -     glipiZIDE (GLUCOTROL) 5 MG tablet; Take 1 tablet (5 mg total) by mouth daily as needed (glucose over 250).    Steatohepatitis:  Prior dx, resolved on last check, reassess labs including INR.  -     Protime-INR; Future    Hyperlipidemia associated with type 2 diabetes mellitus:  Prior dx, persists, continue fibrate and statin.  -     TSH; Future  -     Magnesium; Future    Anxiety:  Prior dx, symptoms were well controlled with Remeron but had too many side effects. Unclear if pt has been on SSRI or Buspar in past or not; I will defer to PCP for further recommendations.    Keep regular follow up appointments with Bacilio Larry MD.   Malcolm Mendieta MD  Internal Medicine    Portions of this note were " completed using medical dictation software. Please excuse typographical or syntax errors that were missed on review.

## 2019-10-07 NOTE — ED TRIAGE NOTES
"Donald Warren Abadie, a 64 y.o. male presents to the ED   Triage note:  Chief Complaint   Patient presents with    Hyperglycemia     Pt reports blood sugar being high today. HI reading earlier but last reading at home was 250s. Pt denies any complaints. No CP or SOB. Borderline diabetic, currently no on any medications       Pt states he is boarderline diabetic and not taking any meds currently. He states the highest reading today was 577 mg/dL. He denies any symptoms at that time or currently.     Review of patient's allergies indicates:   Allergen Reactions    No known drug allergies      Past Medical History:   Diagnosis Date    A-fib     Alcohol abuse     Anxiety     Arthritis     Chronic gout     Diabetes mellitus     DM (diabetes mellitus) 11/16/2016    "boarderline, not taking any meds currently"    Fatty liver     Hyperlipidemia     Renal cell cancer 2014       LOC: The patient is awake, alert, aware of environment with an appropriate affect. Oriented x3, speaking appropriately  APPEARANCE: Pt resting comfortably, in no acute distress, pt is clean and well groomed, clothing properly fastened  SKIN: Skin warm, dry and intact, normal skin turgor, moist mucus membranes  RESPIRATORY: Airway is open and patent, respirations are spontaneous, even and unlabored, normal effort and rate  CARDIAC: Normal rate and rhythm, no peripheral edema noted, capillary refill < 3 seconds, bilateral radial pulses 2+  ABDOMEN: Soft, nontender, nondistended. Bowel sounds present   NEUROLOGIC: PERRL, facial expression is symmetrical, patient moving all extremities spontaneously, normal sensation in all extremities when touched with a finger.  Follows all commands appropriately  MUSCULOSKELETAL: No obvious deformities.      "

## 2019-10-07 NOTE — DISCHARGE INSTRUCTIONS
Begin taking Metformin twice daily as directed  Continue taking a blood sugar log at home  Follow-up with Dr. Larry for further management  Avoid Alcohol consumption while taking Metformin    Return to the emergency room for new, worsening, or concerning symptoms

## 2019-10-07 NOTE — ED PROVIDER NOTES
"Encounter Date: 10/6/2019       History     Chief Complaint   Patient presents with    Hyperglycemia     Pt reports blood sugar being high today. HI reading earlier but last reading at home was 250s. Pt denies any complaints. No CP or SOB. Borderline diabetic, currently no on any medications     64 year old male with medical history of parox Afib, Borderline DM, Former ETOH abuse, HLD presenting to the ED with the chief complaint of hyperglycemia. Patient reports monitoring his blood glucose daily as he knows he is a borderline diabetic. He noticed his CBG was elevated to 577 today which improved to 250s. Patient discussed his elevated CBG with his daughter who is a nurse who suggested he come to the ED for evaluation. Patient reports associated polydipsia. Patient otherwise feels at his baseline health. He denies history of insulin use or other DM medications. He denies fever, headache, vision changes, speech changes, chest pain, SOB, palpitations, abdominal pain, nausea, vomiting, dysuria, hematuria, diarrhea, constipation.         Review of patient's allergies indicates:   Allergen Reactions    No known drug allergies      Past Medical History:   Diagnosis Date    A-fib     Alcohol abuse     Anxiety     Arthritis     Chronic gout     Diabetes mellitus     DM (diabetes mellitus) 11/16/2016    "boarderline, not taking any meds currently"    Fatty liver     Hyperlipidemia     Renal cell cancer 2014     Past Surgical History:   Procedure Laterality Date    ABSCESS DRAINAGE      perirectal    COLONOSCOPY      HAND SURGERY Left     KIDNEY SURGERY      partial left kidney removal - [place]    PARTIAL NEPHRECTOMY Left August 2014    PERCUTANEOUS CRYOTHERAPY OF PERIPHERAL NERVE USING LIQUID NITROUS OXIDE IN CLOSED NEEDLE DEVICE Right 6/17/2019    Procedure: CRYOTHERAPY, NERVE, PERIPHERAL, PERCUTANEOUS, USING LIQUID NITROUS OXIDE IN CLOSED NEEDLE DEVICE-right knee iovera;  Surgeon: Donny Hair III, " MD;  Location: [facility] CATH LAB;  Service: Pain Management;  Laterality: Right;    TOTAL KNEE ARTHROPLASTY Right 6/27/2019    Procedure: ARTHROPLASTY, KNEE, TOTAL-SAME DAY;  Surgeon: Mikael Huerta MD;  Location: [facility] OR [address] [place] [address];  Service: Orthopedics;  Laterality: Right;     Family History   Problem Relation Age of Onset    Lung cancer Father     Cancer Father     Diabetes Mother     Lung cancer Sister     Colon polyps Brother     Cirrhosis Neg Hx      Social History     Tobacco Use    Smoking status: Never Smoker    Smokeless tobacco: Never Used   Substance Use Topics    Alcohol use: Not Currently     Comment: reports quiting alcohol 3mths ago    Drug use: No     Review of Systems   Constitutional: Negative for chills, diaphoresis and fever.   HENT: Negative for congestion, sore throat and trouble swallowing.    Eyes: Negative for pain, redness and visual disturbance.   Respiratory: Negative for cough and shortness of breath.    Cardiovascular: Negative for chest pain.   Gastrointestinal: Negative for abdominal pain, nausea and vomiting.   Endocrine: Positive for polydipsia. Negative for polyphagia and polyuria.   Genitourinary: Negative for dysuria and hematuria.   Musculoskeletal: Negative for back pain, neck pain and neck stiffness.   Skin: Negative for rash.   Neurological: Negative for weakness, light-headedness, numbness and headaches.   Hematological: Does not bruise/bleed easily.       Physical Exam     Initial Vitals [10/06/19 2238]   BP Pulse Resp Temp SpO2   129/77 70 16 98.4 °F (36.9 °C) 98 %      MAP       --         Physical Exam    Constitutional: He appears well-developed and well-nourished. He is not diaphoretic. No distress.   HENT:   Head: Normocephalic and atraumatic.   Mouth/Throat: Oropharynx is clear and moist. No oropharyngeal exudate.   Eyes: EOM are normal. Pupils are equal, round, and reactive to light.   Neck: Normal range of motion. Neck supple.   Cardiovascular: Normal  rate, regular rhythm and intact distal pulses.   Pulmonary/Chest: Breath sounds normal. No respiratory distress. He has no wheezes.   Speaking full sentences without difficulty   Abdominal: Soft. Bowel sounds are normal. There is no tenderness. There is no guarding.   Musculoskeletal: Normal range of motion. He exhibits no edema or tenderness.   No midline spinal tenderness   Lymphadenopathy:     He has no cervical adenopathy.   Neurological: He is alert and oriented to person, place, and time. He has normal strength. No cranial nerve deficit. GCS score is 15. GCS eye subscore is 4. GCS verbal subscore is 5. GCS motor subscore is 6.   Skin: Skin is warm and dry. No erythema.         ED Course   Procedures  Labs Reviewed   POCT GLUCOSE - Abnormal; Notable for the following components:       Result Value    POCT Glucose 203 (*)     All other components within normal limits   ISTAT PROCEDURE - Abnormal; Notable for the following components:    POC Glucose 216 (*)     POC Sodium 135 (*)     POC Chloride 93 (*)     POC TCO2 (MEASURED) 33 (*)     All other components within normal limits   ISTAT PROCEDURE - Abnormal; Notable for the following components:    POC PCO2 48.1 (*)     POC PO2 28 (*)     POC HCO3 32.3 (*)     POC SATURATED O2 53 (*)     POC TCO2 34 (*)     All other components within normal limits   ISTAT CHEM8          Imaging Results    None          Medical Decision Making:   History:   Old Medical Records: I decided to obtain old medical records.  Old Records Summarized: records from clinic visits.  Clinical Tests:   Lab Tests: Ordered and Reviewed       APC / Resident Notes:   64 year old male with medical history of parox Afib, Borderline DM, Former ETOH abuse, HLD presenting to the ED c/o hyperglycemia. Records CBG daily and reports having the highest reading of 577 today at home. Denies any medical complaints. Advised to come to the ED by his daughter. DDx includes but not limited to hyperglycemia, DM,  HHS, DKA, electrolyte disturbance, medication side effect. Will check VBG and chem8.     Work up shows POCT glucose 203. VBG pH 7.4. Chem8 shows CO2 33, Crt 0.9, BUN 15, Anion gap 9. Presentation not consistent with DKA. Chart review shows mild elevated in A1c 7.9 3 months ago. Will start patient on Metformin 500mg BID for further management. Patient denies any medical complaints and stable for outpatient follow-up with PCP for further evaluation. Patient expresses understanding and agreeable to the plan. Return to ED precautions given for new, worsening, or concerning symptoms. I have discussed the care of this patient with my supervising physician.                  Clinical Impression:       ICD-10-CM ICD-9-CM   1. Type 2 diabetes mellitus with hyperglycemia, without long-term current use of insulin E11.65 250.00     790.29   2. Hyperglycemia R73.9 790.29         Disposition:   Disposition: Discharged  Condition: Stable                        Malcolm Narayan PA-C  10/07/19 0036

## 2019-10-07 NOTE — Clinical Note
Pt stopping Remeron due to hyperglycemia, but wants to start another long-acting agent for anxiety. I didn't delve into prior medications at this visit; he'll see you in about 2 weeks for evaluation. Have a great week! --Malcolm

## 2019-10-08 ENCOUNTER — TELEPHONE (OUTPATIENT)
Dept: INTERNAL MEDICINE | Facility: CLINIC | Age: 64
End: 2019-10-08

## 2019-10-14 ENCOUNTER — TELEPHONE (OUTPATIENT)
Dept: INTERNAL MEDICINE | Facility: CLINIC | Age: 64
End: 2019-10-14

## 2019-10-14 ENCOUNTER — NURSE TRIAGE (OUTPATIENT)
Dept: ADMINISTRATIVE | Facility: CLINIC | Age: 64
End: 2019-10-14

## 2019-10-14 NOTE — TELEPHONE ENCOUNTER
Was seen in the ED last week for hyperglycemia, but his glucose is still running in the 300's.  Asymptomatic.    Reason for Disposition   Blood glucose > 300 mg/dl (16.7 mmol/l) AND two or more times in a row    Additional Information   Negative: Unconscious or difficult to awaken   Negative: Acting confused (e.g., disoriented, slurred speech)   Negative: Very weak (can't stand)   Negative: Sounds like a life-threatening emergency to the triager   Negative: Vomiting and signs of dehydration (e.g., very dry mouth, lightheaded, etc.)   Negative: Blood glucose > 240 mg/dl (13 mmol/l) and rapid breathing   Negative: Blood glucose > 500 mg/dl (27.5 mmol/l)   Negative: Blood glucose > 240 mg/dl (13 mmol/l) AND urine ketones moderate-large (or more than 1+)   Negative: Blood glucose > 240 mg/dl (13 mmol/l) and blood ketones > 1.5 mmol/l   Negative: Blood glucose > 240 mg/dl (13 mmol/l) AND vomiting AND unable to check for ketones (in blood or urine)   Negative: Vomiting lasting > 4 hours   Negative: Patient sounds very sick or weak to the triager   Negative: Fever > 100.5 F (38.1 C)   Negative: Caller has URGENT medication or insulin pump question and triager unable to answer question   Negative: Blood glucose > 400 mg/dl (22 mmol/l)    Protocols used: DIABETES - HIGH BLOOD SUGAR-A-OH

## 2019-10-14 NOTE — TELEPHONE ENCOUNTER
----- Message from Lorena Hansen RN sent at 10/14/2019  9:43 AM CDT -----  Contact: chani/ 504       ----- Message -----  From: Rashmi Johnson  Sent: 10/14/2019   9:41 AM CDT  To: Laron MANDEL Staff    Patient's daughter, Chani, called.   No. [phone]   Chani has questions regarding the lab results.    Please call.     Please see note from triage nurse. Patient blood sugar level is 265.

## 2019-10-14 NOTE — TELEPHONE ENCOUNTER
Daughter spoke to [facility] today. They area asking for Med changes because his blood sugars are in the 300 . Also is the form ready for .

## 2019-10-15 ENCOUNTER — PATIENT OUTREACH (OUTPATIENT)
Dept: ADMINISTRATIVE | Facility: OTHER | Age: 64
End: 2019-10-15

## 2019-10-15 DIAGNOSIS — Z12.11 ENCOUNTER FOR FIT (FECAL IMMUNOCHEMICAL TEST) SCREENING: Primary | ICD-10-CM

## 2019-10-16 ENCOUNTER — OFFICE VISIT (OUTPATIENT)
Dept: PSYCHIATRY | Facility: CLINIC | Age: 64
End: 2019-10-16
Payer: MEDICARE

## 2019-10-16 VITALS
WEIGHT: 179.44 LBS | HEIGHT: 66 IN | BODY MASS INDEX: 28.84 KG/M2 | DIASTOLIC BLOOD PRESSURE: 56 MMHG | SYSTOLIC BLOOD PRESSURE: 121 MMHG | HEART RATE: 77 BPM

## 2019-10-16 DIAGNOSIS — F10.21 ALCOHOL USE DISORDER, SEVERE, IN SUSTAINED REMISSION: Primary | ICD-10-CM

## 2019-10-16 PROCEDURE — 99499 UNLISTED E&M SERVICE: CPT | Mod: HCNC,S$GLB,, | Performed by: PSYCHIATRY & NEUROLOGY

## 2019-10-16 PROCEDURE — 90791 PR PSYCHIATRIC DIAGNOSTIC EVALUATION: ICD-10-PCS | Mod: HCNC,S$GLB,, | Performed by: PSYCHIATRY & NEUROLOGY

## 2019-10-16 PROCEDURE — 99499 RISK ADDL DX/OHS AUDIT: ICD-10-PCS | Mod: HCNC,S$GLB,, | Performed by: PSYCHIATRY & NEUROLOGY

## 2019-10-16 PROCEDURE — 90791 PSYCH DIAGNOSTIC EVALUATION: CPT | Mod: HCNC,S$GLB,, | Performed by: PSYCHIATRY & NEUROLOGY

## 2019-10-16 PROCEDURE — 99999 PR PBB SHADOW E&M-EST. PATIENT-LVL II: CPT | Mod: PBBFAC,HCNC,, | Performed by: PSYCHIATRY & NEUROLOGY

## 2019-10-16 PROCEDURE — 99999 PR PBB SHADOW E&M-EST. PATIENT-LVL II: ICD-10-PCS | Mod: PBBFAC,HCNC,, | Performed by: PSYCHIATRY & NEUROLOGY

## 2019-10-16 RX ORDER — BUSPIRONE HYDROCHLORIDE 15 MG/1
15 TABLET ORAL 2 TIMES DAILY
Qty: 60 TABLET | Refills: 2 | Status: SHIPPED | OUTPATIENT
Start: 2019-10-16 | End: 2020-10-27

## 2019-10-16 NOTE — PROGRESS NOTES
Outpatient Psychiatry Initial Visit (MD/NP)    10/16/2019    Donald Warren Abadie, a 64 y.o. male, for initial evaluation visit.  Patient is referred by Bacilio Larry MD       Reason for Encounter: Referral for treatment    Complaints:  He has been experiencing generalized anxiety, and has a history of severe alcohol use disorder. He has completed the ABU here and has been sober for about 7 months at this time. He is in good self control now and complying with his care re the alcohol use issue. However, he indicated he stays so anxious that when he is ready to go to sleep he cannot fall asleep or get adequate sleep overall. He has had this problem for a long time. He has no signs or symptoms of sinai or psychosis, and denies any symptoms of depression. He has no history of suicide attempts,and no history of psych hospitalizations. He has adequate energy and demonstrated a sense of humor during this interview. He is  and has a daughter now 34 y.o. He is retired after having worked at [employer] for 43 years. He is a high school graduate. He described side effects from taking gabapentin, trazadone, and doxepin. He had tried his son-in-law's Buspar twice and found it helped him relax and asked if he could try that to help him so he could relax and sleep better with a reduced anxiety level. He did not want anything that was potentially addictive. He is well motivated to continue his abstinence from alcohol, and stated his daughter helps him with that motivation as well. He has a family history of alcohol use as well.  Current Medications:  Medication List with Changes/Refills   New Medications    BUSPIRONE (BUSPAR) 15 MG TABLET    Take 1 tablet (15 mg total) by mouth 2 (two) times daily.   Current Medications    ALLOPURINOL (ZYLOPRIM) 100 MG TABLET    TAKE 2 TABLETS BY MOUTH DAILY    APIXABAN (ELIQUIS) 5 MG TAB    Take 1 tablet (5 mg total) by mouth 2 (two) times daily.    CYANOCOBALAMIN  (VITAMIN B-12) 1000 MCG TABLET    Take 1 tablet (1,000 mcg total) by mouth once daily.    DICLOFENAC SODIUM (VOLTAREN) 1 % GEL    Apply 2 g topically once daily.    FAMOTIDINE (PEPCID) 20 MG TABLET    Take 1 tablet (20 mg total) by mouth 2 (two) times daily.    FENOFIBRATE 160 MG TAB    Take 1 tablet (160 mg total) by mouth every evening.    FLECAINIDE (TAMBOCOR) 100 MG TAB    Take 1 tablet (100 mg total) by mouth every 12 (twelve) hours.    FOLIC ACID (FOLVITE) 1 MG TABLET    Take 1 tablet (1 mg total) by mouth once daily.    GLIPIZIDE (GLUCOTROL) 5 MG TABLET    TAKE 1 TABLET BY MOUTH EVERY DAY AS NEEDED FOR GLUCOSE OVER 250    METFORMIN (GLUCOPHAGE) 500 MG TABLET    Take 1 tablet (500 mg total) by mouth 2 (two) times daily with meals. for 20 days    METOPROLOL TARTRATE (LOPRESSOR) 25 MG TABLET    Take 0.5 tablets (12.5 mg total) by mouth 2 (two) times daily.    MULTIVITAMIN (THERAGRAN) TABLET    Take 1 tablet by mouth once daily.    OMEGA-3 ACID ETHYL ESTERS (LOVAZA) 1 GRAM CAPSULE    Take 2 g by mouth 2 (two) times daily.    ONDANSETRON (ZOFRAN) 8 MG TABLET    Take 1 tablet (8 mg total) by mouth every 8 (eight) hours as needed for Nausea.    OXYCODONE-ACETAMINOPHEN (PERCOCET) 5-325 MG PER TABLET    Take 1 tablet by mouth every 6 (six) hours as needed for Pain.    PANTOPRAZOLE (PROTONIX) 40 MG TABLET    Take 1 tablet (40 mg total) by mouth once daily.    RANITIDINE (ZANTAC) 150 MG CAPSULE    Take 150 mg by mouth 2 (two) times daily.    ROSUVASTATIN (CRESTOR) 20 MG TABLET    Take 1 tablet (20 mg total) by mouth once daily.    TAMSULOSIN (FLOMAX) 0.4 MG CAP    Take 2 capsules (0.8 mg total) by mouth once daily.    THIAMINE 100 MG TABLET    Take 1 tablet (100 mg total) by mouth once daily.   Discontinued Medications    DOXEPIN (SINEQUAN) 10 MG CAPSULE    Take 1 capsule (10 mg total) by mouth every evening.        Stressors:  Maintaining sobriety and dealing with generalized anxiety.    History:     Past Psychiatric  History:   Previous therapy: yes  Previous psychiatric treatment and medication trials: yes  Previous psychiatric hospitalizations: no  Previous diagnoses: yes  Previous suicide attempts: no  History of violence: no  Currently in treatment with myself and counselor here.  Education: high school diploma/GED  Other pertinent history: None  Depression screening was performed with standardized tool: Not Screened - Not Eligible/Appropriate    Substance Abuse History:  Recreational drugs: No recreational drug use  Use of alcohol: denied  Use of caffeine: denies use  Tobacco use: no  Legal consequences of chemical use: no  Patient feels he ought to cut down on drinking and/or drug use: no  Patient has been annoyed by others criticizing his drinking or drug use: no  Patient has felt bad or guilty about drinking or drug use:no  Patient has had a drink or used drugs as an eye opener first thing in the morning to steady nerves, get rid of a hangover or get the day started: no  Use of OTC medications:  vitamins    The following portions of the patient's history were reviewed and updated as appropriate: allergies, current medications, past family history, past medical history, past social history, past surgical history and problem list.    Record Review: moderate      Review Of Systems:     Medical Review Of Systems:  ROS     Psychiatric Review Of Systems:  Sleep: yes  Appetite changes: no  Weight changes: no  Energy: no  Interest/pleasure/anhedonia: no  Somatic symptoms: no  Libido: no  Anxiety/panic: yes  Guilty/hopeless: no  Self-injurious behavior/risky behavior: no  Any drugs: no  Alcohol: no     Current Evaluation:       Mental Status Evaluation:  Appearance:  unremarkable, age appropriate, casually dressed, neatly groomed   Behavior:  normal, cooperative   Speech:  no latency; no press, spontaneous   Mood:  happy   Affect:  congruent and appropriate   Thought Process:  normal and logical   Thought Content:  normal, no  suicidality, no homicidality, delusions, or paranoia   Sensorium:  grossly intact   Cognition:  grossly intact   Insight:  has awareness of illness   Judgment:  behavior is adequate to circumstances     SIGECAPS  Sleep:   Interest:   Guilt:   Energy:   Concentration:   Appetite:   Psychomotor agitation:   Suicidal intentions: no  Suicidal plan: no    Physical/Somatic Complaints  The patient lists: no physical complaints.    Functioning in Relationships:  Spouse/partner:   Peers:   Employers:       Assessment - Diagnosis - Goals:       ICD-10-CM ICD-9-CM   1. Alcohol use disorder, severe, in sustained remission F10.21 305.03       Treatment Goals:  Specify outcomes written in observable, behavioral terms:   Reduce sleep difficulty by reduction of anxiety with meds and supportive counseling    Treatment Plan/Recommendations: Start Buspar 15mg bid.Return for next visit in one month. He was advised he could call if he has problems with the medicine or his condition. I reviewed the side effects of his med at this time as well.  Follow-up plan for depression was discussed with patient.    Review with patient: Treatment plan reviewed with the patient.  Medication risks/benefit reviewed with the patient    Rafita Kay Jr

## 2019-10-17 ENCOUNTER — OFFICE VISIT (OUTPATIENT)
Dept: ENDOCRINOLOGY | Facility: CLINIC | Age: 64
End: 2019-10-17
Payer: MEDICARE

## 2019-10-17 ENCOUNTER — LAB VISIT (OUTPATIENT)
Dept: LAB | Facility: HOSPITAL | Age: 64
End: 2019-10-17
Attending: INTERNAL MEDICINE
Payer: MEDICARE

## 2019-10-17 VITALS
SYSTOLIC BLOOD PRESSURE: 100 MMHG | HEART RATE: 60 BPM | HEIGHT: 66 IN | BODY MASS INDEX: 28.7 KG/M2 | DIASTOLIC BLOOD PRESSURE: 60 MMHG | WEIGHT: 178.56 LBS

## 2019-10-17 DIAGNOSIS — R20.8 OTHER DISTURBANCES OF SKIN SENSATION: ICD-10-CM

## 2019-10-17 DIAGNOSIS — E11.42 TYPE 2 DIABETES MELLITUS WITH DIABETIC POLYNEUROPATHY, WITHOUT LONG-TERM CURRENT USE OF INSULIN: ICD-10-CM

## 2019-10-17 DIAGNOSIS — E11.42 TYPE 2 DIABETES MELLITUS WITH DIABETIC POLYNEUROPATHY, WITHOUT LONG-TERM CURRENT USE OF INSULIN: Primary | ICD-10-CM

## 2019-10-17 DIAGNOSIS — E11.69 HYPERLIPIDEMIA ASSOCIATED WITH TYPE 2 DIABETES MELLITUS: ICD-10-CM

## 2019-10-17 DIAGNOSIS — E78.5 HYPERLIPIDEMIA ASSOCIATED WITH TYPE 2 DIABETES MELLITUS: ICD-10-CM

## 2019-10-17 PROBLEM — I46.9 CARDIAC ARREST: Status: RESOLVED | Noted: 2019-03-15 | Resolved: 2019-10-17

## 2019-10-17 PROBLEM — E87.1 HYPONATREMIA: Status: RESOLVED | Noted: 2019-03-15 | Resolved: 2019-10-17

## 2019-10-17 PROBLEM — E87.20 LACTIC ACIDOSIS: Status: RESOLVED | Noted: 2019-03-15 | Resolved: 2019-10-17

## 2019-10-17 PROBLEM — G93.41 ACUTE METABOLIC ENCEPHALOPATHY: Status: RESOLVED | Noted: 2019-03-22 | Resolved: 2019-10-17

## 2019-10-17 PROBLEM — Z96.651 S/P TKR (TOTAL KNEE REPLACEMENT), RIGHT: Status: RESOLVED | Noted: 2019-06-27 | Resolved: 2019-10-17

## 2019-10-17 LAB
ALBUMIN/CREAT UR: NORMAL UG/MG (ref 0–30)
CREAT UR-MCNC: 41 MG/DL (ref 23–375)
MICROALBUMIN UR DL<=1MG/L-MCNC: <2.5 UG/ML

## 2019-10-17 PROCEDURE — 1100F PTFALLS ASSESS-DOCD GE2>/YR: CPT | Mod: HCNC,CPTII,S$GLB, | Performed by: INTERNAL MEDICINE

## 2019-10-17 PROCEDURE — 3078F DIAST BP <80 MM HG: CPT | Mod: HCNC,CPTII,S$GLB, | Performed by: INTERNAL MEDICINE

## 2019-10-17 PROCEDURE — 3008F BODY MASS INDEX DOCD: CPT | Mod: HCNC,CPTII,S$GLB, | Performed by: INTERNAL MEDICINE

## 2019-10-17 PROCEDURE — 82043 UR ALBUMIN QUANTITATIVE: CPT | Mod: HCNC

## 2019-10-17 PROCEDURE — 99999 PR PBB SHADOW E&M-EST. PATIENT-LVL IV: CPT | Mod: PBBFAC,HCNC,, | Performed by: INTERNAL MEDICINE

## 2019-10-17 PROCEDURE — 1100F PR PT FALLS ASSESS DOC 2+ FALLS/FALL W/INJURY/YR: ICD-10-PCS | Mod: HCNC,CPTII,S$GLB, | Performed by: INTERNAL MEDICINE

## 2019-10-17 PROCEDURE — 3078F PR MOST RECENT DIASTOLIC BLOOD PRESSURE < 80 MM HG: ICD-10-PCS | Mod: HCNC,CPTII,S$GLB, | Performed by: INTERNAL MEDICINE

## 2019-10-17 PROCEDURE — 99204 PR OFFICE/OUTPT VISIT, NEW, LEVL IV, 45-59 MIN: ICD-10-PCS | Mod: HCNC,S$GLB,, | Performed by: INTERNAL MEDICINE

## 2019-10-17 PROCEDURE — 3046F HEMOGLOBIN A1C LEVEL >9.0%: CPT | Mod: HCNC,CPTII,S$GLB, | Performed by: INTERNAL MEDICINE

## 2019-10-17 PROCEDURE — 3288F PR FALLS RISK ASSESSMENT DOCUMENTED: ICD-10-PCS | Mod: HCNC,CPTII,S$GLB, | Performed by: INTERNAL MEDICINE

## 2019-10-17 PROCEDURE — 99999 PR PBB SHADOW E&M-EST. PATIENT-LVL IV: ICD-10-PCS | Mod: PBBFAC,HCNC,, | Performed by: INTERNAL MEDICINE

## 2019-10-17 PROCEDURE — 3046F PR MOST RECENT HEMOGLOBIN A1C LEVEL > 9.0%: ICD-10-PCS | Mod: HCNC,CPTII,S$GLB, | Performed by: INTERNAL MEDICINE

## 2019-10-17 PROCEDURE — 3288F FALL RISK ASSESSMENT DOCD: CPT | Mod: HCNC,CPTII,S$GLB, | Performed by: INTERNAL MEDICINE

## 2019-10-17 PROCEDURE — 3074F SYST BP LT 130 MM HG: CPT | Mod: HCNC,CPTII,S$GLB, | Performed by: INTERNAL MEDICINE

## 2019-10-17 PROCEDURE — 99204 OFFICE O/P NEW MOD 45 MIN: CPT | Mod: HCNC,S$GLB,, | Performed by: INTERNAL MEDICINE

## 2019-10-17 PROCEDURE — 3074F PR MOST RECENT SYSTOLIC BLOOD PRESSURE < 130 MM HG: ICD-10-PCS | Mod: HCNC,CPTII,S$GLB, | Performed by: INTERNAL MEDICINE

## 2019-10-17 PROCEDURE — 3008F PR BODY MASS INDEX (BMI) DOCUMENTED: ICD-10-PCS | Mod: HCNC,CPTII,S$GLB, | Performed by: INTERNAL MEDICINE

## 2019-10-17 RX ORDER — METFORMIN HYDROCHLORIDE 1000 MG/1
1000 TABLET ORAL 2 TIMES DAILY WITH MEALS
Qty: 180 TABLET | Refills: 3 | Status: SHIPPED | OUTPATIENT
Start: 2019-10-17 | End: 2019-10-22

## 2019-10-17 NOTE — LETTER
October 21, 2019      Malcolm Mendieta MD  [address]  [facility] [address]           [facility] [place]  [address]  [facility] [address]  Phone: [phone]  Fax: [phone]          Patient: Donald Warren Abadie   MR Number: [id number]   YOB: 1955   Date of Visit: 10/17/2019       Dear Dr. Malcolm Mendieta:    Thank you for referring Donald Abadie to me for evaluation. Attached you will find relevant portions of my assessment and plan of care.    If you have questions, please do not hesitate to call me. I look forward to following Donald Abadie along with you.    Sincerely,    Celia Martinez MD    Enclosure  CC:  No Recipients    If you would like to receive this communication electronically, please contact [email or web address] or [phone] to request more information on [employer] access.    For providers and/or their staff who would like to refer a patient to [facility], please contact us through our one-stop-shop provider referral line, [facility], at [phone].    If you feel you have received this communication in error or would no longer like to receive these types of communications, please e-mail [email or web address]

## 2019-10-17 NOTE — PROGRESS NOTES
Subjective:      Patient ID: Donald Warren Abadie is a 64 y.o. male.    Chief Complaint:  Diabetes      History of Present Illness  With regards to the diabetes:  Seen in ed 10/6/2019   Diagnosed: elevated hba1c 4/2008     Was on Remeron but it was stopped  He feels this increased his appetite and caused the hyperglycemia     Since stopping all bg are better       Current regimen:  Metformin 500  Bid - started 10/6/19   CANTU started  10/7/19 glucotrol 5 qam - advised to take if elevated      Missed doses?  Has not started the glucotrol     Failed treatments:   None      # times a day testing 2-3   Log reviewed: yes  Much better over the past few days > 200      Hypoglycemic event? None    Diabetes Management Status    Statin: Taking  ACE/ARB: Not taking    Screening or Prevention Patient's value Goal Complete/Controlled?   HgA1C Testing and Control   Lab Results   Component Value Date    HGBA1C 9.4 (H) 10/07/2019      Annually/Less than 8% No   Lipid profile : 06/21/2019 Annually Yes   LDL control Lab Results   Component Value Date    LDLCALC 115.4 06/21/2019    Annually/Less than 100 mg/dl  No   Nephropathy screening Lab Results   Component Value Date    LABMICR <2.5 10/17/2019     Lab Results   Component Value Date    PROTEINUA Negative 03/21/2019    Annually Yes   Blood pressure BP Readings from Last 1 Encounters:   10/17/19 100/60    Less than 140/90 Yes   Dilated retinal exam : 02/07/2019 Annually Yes   Foot exam   : 10/17/2019 Annually No       Last eye exam: w/i the year. no DR  2/2019   Last podiatry exam: none    Denies numbness or tingling of feet    Typical meals: trying to keep to a healthy diet    Education - last visit:   Exercise - tries to stay active     No Polyuria polydipsia nocturia or vision changes    With regards to dyslipidemia:  Tolerating statin       Review of Systems   Constitutional: Negative for unexpected weight change.   Eyes: Negative for visual disturbance.   Respiratory: Negative for  "shortness of breath.    Cardiovascular: Negative for chest pain.   Gastrointestinal: Negative for abdominal pain.   Musculoskeletal: Negative for myalgias.   Skin: Negative for wound.   Neurological: Negative for headaches.   Hematological: Does not bruise/bleed easily.   Psychiatric/Behavioral: Negative for sleep disturbance.       Objective:     /60   Pulse 60   Ht 5' 6" (1.676 m)   Wt 81 kg (178 lb 9.2 oz)   BMI 28.82 kg/m²   BP Readings from Last 3 Encounters:   10/17/19 100/60   10/07/19 112/64   10/06/19 129/77     Wt Readings from Last 1 Encounters:   10/17/19 0813 81 kg (178 lb 9.2 oz)     Body mass index is 28.82 kg/m².      Physical Exam   Constitutional: He appears well-developed.   HENT:   Right Ear: External ear normal.   Left Ear: External ear normal.   Nose: Nose normal.   Hearing normal    Neck: No tracheal deviation present. No thyromegaly present.   Cardiovascular: Normal rate.   No murmur heard.  Pulses:       Dorsalis pedis pulses are 1+ on the right side, and 1+ on the left side.   Pulmonary/Chest: Effort normal and breath sounds normal.   Abdominal: Soft. He exhibits no mass.   No hernia noted   Musculoskeletal: He exhibits no edema.        Right foot: There is no deformity.        Left foot: There is no deformity.   Feet:   Right Foot:   Protective Sensation: 3 sites tested. 3 sites sensed.   Skin Integrity: Negative for ulcer.   Left Foot:   Protective Sensation: 3 sites tested. 3 sites sensed.   Skin Integrity: Negative for ulcer.   Neurological: He is alert. No cranial nerve deficit or sensory deficit. Coordination and gait normal.        Skin: No rash noted.   No induration   injection sites are ok. No lipo hypertropthy or atrophy    +acanthosis and skin tags  No supraclavicular fulness  Pale thin striae  Normal proximal muscle strength     Psychiatric: He has a normal mood and affect. Judgment normal.   Vitals reviewed.        sensation decreased to vibration       Lab Review: "   Lab Results   Component Value Date    HGBA1C 9.4 (H) 10/07/2019     Lab Results   Component Value Date    CHOL 167 06/21/2019    HDL 28 (L) 06/21/2019    LDLCALC 115.4 06/21/2019    TRIG 118 06/21/2019    CHOLHDL 16.8 (L) 06/21/2019     Lab Results   Component Value Date     (L) 10/07/2019    K 4.2 10/07/2019    CL 98 10/07/2019    CO2 26 10/07/2019     (H) 10/07/2019    BUN 19 10/07/2019    CREATININE 1.3 10/07/2019    CALCIUM 9.8 10/07/2019    PROT 7.4 10/07/2019    ALBUMIN 4.2 10/07/2019    BILITOT 0.4 10/07/2019    ALKPHOS 88 10/07/2019    AST 19 10/07/2019    ALT 17 10/07/2019    ANIONGAP 11 10/07/2019    ESTGFRAFRICA >60 10/07/2019    EGFRNONAA 57 (A) 10/07/2019    TSH 2.092 10/07/2019     No results found for: [id number]    Assessment and Plan     Type 2 diabetes mellitus with diabetic polyneuropathy, without long-term current use of insulin  Reviewed goals of therapy are to get the best control we can without hypoglycemia    Refer to education    Medication changes:   Stop: aguilar   Increase metformin            -Advised occasional self blood glucose monitoring.  Patient encouraged to document glucose results and bring them to every clinic visit      -Hypoglycemia precautions discussed. Instructed on precautions before driving.      -Close adherence to lifestyle changes recommended.      -Periodic follow ups for eye evaluations, foot care and dental care suggested.    rtc in 3 months  Refer to podiatry             Hyperlipidemia associated with type 2 diabetes mellitus  On statin       Gave dm handout   Urine today

## 2019-10-21 NOTE — ASSESSMENT & PLAN NOTE
Reviewed goals of therapy are to get the best control we can without hypoglycemia    Refer to education    Medication changes:   Stop: aguilar   Increase metformin            -Advised occasional self blood glucose monitoring.  Patient encouraged to document glucose results and bring them to every clinic visit      -Hypoglycemia precautions discussed. Instructed on precautions before driving.      -Close adherence to lifestyle changes recommended.      -Periodic follow ups for eye evaluations, foot care and dental care suggested.    rtc in 3 months  Refer to podiatry

## 2019-10-22 ENCOUNTER — TELEPHONE (OUTPATIENT)
Dept: ORTHOPEDICS | Facility: CLINIC | Age: 64
End: 2019-10-22

## 2019-10-22 ENCOUNTER — PATIENT OUTREACH (OUTPATIENT)
Dept: ADMINISTRATIVE | Facility: OTHER | Age: 64
End: 2019-10-22

## 2019-10-22 DIAGNOSIS — E11.9 TYPE 2 DIABETES MELLITUS WITHOUT COMPLICATION, WITHOUT LONG-TERM CURRENT USE OF INSULIN: Primary | ICD-10-CM

## 2019-10-22 RX ORDER — METFORMIN HYDROCHLORIDE 500 MG/1
1000 TABLET, EXTENDED RELEASE ORAL 2 TIMES DAILY WITH MEALS
Qty: 360 TABLET | Refills: 3 | Status: SHIPPED | OUTPATIENT
Start: 2019-10-22 | End: 2020-06-03

## 2019-10-22 NOTE — TELEPHONE ENCOUNTER
----- Message from Claudia Italo sent at 10/22/2019  1:28 PM CDT -----  Contact: pt at [phone]  GISSELLE PT-Just started taking Metformin 1000mg and pt is requesting a smaller dosage.  Like a 250 mg.  Metaire formin 500mg is too much also.  Please call to discuss with pt.  Just got his Metformin for a week now.   Pt uses Taras in pts chart.

## 2019-10-22 NOTE — TELEPHONE ENCOUNTER
Spoke to pt and he stated he showed Dr. Huerta his thumb and Dr. Huerta stated he would refer him to someone, pt wanted to know who. Informed him I will ask his MA tomorrow and they will let him know. Pt verbalized understanding.----- Message from Marlin Long sent at 10/22/2019  1:34 PM CDT -----  Contact: pt @ [phone]  Asking to speak with someone in Dr. Huerta's office regarding a shot/injection that Dr. Huerta mentioned to him regarding his thumb (trigger finger). Says it is very painful. Please call.

## 2019-10-22 NOTE — TELEPHONE ENCOUNTER
Lots of vomiting with Metformin.  He is willing to try the extended release form.  I pended 500 mg ER with 2 tablets BID.

## 2019-10-24 ENCOUNTER — CLINICAL SUPPORT (OUTPATIENT)
Dept: DIABETES | Facility: CLINIC | Age: 64
End: 2019-10-24
Payer: MEDICARE

## 2019-10-24 DIAGNOSIS — E11.9 DIABETES MELLITUS TYPE 2, NONINSULIN DEPENDENT: ICD-10-CM

## 2019-10-24 DIAGNOSIS — E11.42 TYPE 2 DIABETES MELLITUS WITH DIABETIC POLYNEUROPATHY, WITHOUT LONG-TERM CURRENT USE OF INSULIN: ICD-10-CM

## 2019-10-24 DIAGNOSIS — E11.65 TYPE 2 DIABETES MELLITUS WITH HYPERGLYCEMIA, WITHOUT LONG-TERM CURRENT USE OF INSULIN: ICD-10-CM

## 2019-10-24 PROCEDURE — 99999 PR PBB SHADOW E&M-EST. PATIENT-LVL II: CPT | Mod: PBBFAC,HCNC,,

## 2019-10-24 PROCEDURE — G0108 DIAB MANAGE TRN  PER INDIV: HCPCS | Mod: HCNC,S$GLB,, | Performed by: INTERNAL MEDICINE

## 2019-10-24 PROCEDURE — 99999 PR PBB SHADOW E&M-EST. PATIENT-LVL II: ICD-10-PCS | Mod: PBBFAC,HCNC,,

## 2019-10-24 PROCEDURE — G0108 PR DIAB MANAGE TRN  PER INDIV: ICD-10-PCS | Mod: HCNC,S$GLB,, | Performed by: INTERNAL MEDICINE

## 2019-10-24 NOTE — PROGRESS NOTES
Diabetes Education  Author: Lamar Hernandez RN, CDE  Date: 10/24/2019    Diabetes Care Management Summary  Diabetes Education Record Assessment/Progress: Initial  Current Diabetes Risk Level: Moderate     Last A1c:   Lab Results   Component Value Date    HGBA1C 9.4 (H) 10/07/2019     Last visit with Diabetes Educator: : 10/24/2019           Diabetes History  Diabetes Diagnosis: 5-10 years  Current Treatment: Oral Medication(Metformin 1000 mg bid; takes 500 mg 4 x day)  Reviewed Problem List with Patient: Yes    Health Maintenance was reviewed today with patient. Discussed with patient importance of routine eye exams, foot exams/foot care, blood work (i.e.: A1c, microalbumin, and lipid), dental visits, yearly flu vaccine, and pneumonia vaccine as indicated by PCP. Patient verbalized understanding.     Health Maintenance Topics with due status: Not Due       Topic Last Completion Date    TETANUS VACCINE 01/26/2011    Eye Exam 02/07/2019    Lipid Panel 06/21/2019    Hemoglobin A1c 10/07/2019    Foot Exam 10/17/2019    Urine Microalbumin 10/17/2019    Low Dose Statin 10/21/2019     Health Maintenance Due   Topic Date Due    Colonoscopy  06/22/2019    Pneumococcal Vaccine (65+ High/Highest Risk) (1 of 2 - PCV13) 09/21/2019       Nutrition  Meal Planning: skipping meals, snacks between meal, eats out often  What type of beverages do you drink?: water  Meal Plan 24 Hour Recall - Breakfast: usually skips, but will eat cereal or eggs, ham, hernandez  Meal Plan 24 Hour Recall - Lunch: sandwich meat, veg, starch  Meal Plan 24 Hour Recall - Dinner: meat, veg, starch  Meal Plan 24 Hour Recall - Snack: ice cream cookies, fruit bananas    Monitoring   Monitoring: Other  Self Monitoring : SMBG states 10 x day oral recall F 180-190 L 190 D 140-200  Blood Glucose Logs: No  Do you use a personal continuous glucose monitor?: No  In the last month, how often have you had a low blood sugar reaction?: never    Exercise   Exercise Type:  none(tries to keep active)    Current Diabetes Treatment   Current Treatment: Oral Medication(Metformin 1000 mg bid; takes 500 mg 4 x day)    Social History  Preferred Learning Method: Face to Face  Primary Support: Self  Smoking Status: Never a Smoker  Alcohol Use: Weekly    Barriers to Change  Barriers to Change: None  Learning Challenges : None    Readiness to Learn   Readiness to Learn : Acceptance    Cultural Influences  Cultural Influences: None    Diabetes Education Assessment/Progress    Diabetes Disease Process (diabetes disease process and treatment options): Discussion, Comprehends Key Points, Written Materials Provided              - general progression discussed              - significance of current A1c              - blood glucose goals              - explained he is at high risk for cardiovascular and cerebellar vascular events    Nutrition (Incorporating nutritional management into one's lifestyle): Discussion, Comprehends Key Points, Written Materials Provided  Reviewed patients eating habits. At this time he is not portioning his food. He is snacking on high carb foods.  Reviewed carb sources and appropriate amounts per meal and snack. The plate method for carb counting was discussed.  Educated patient on proper measurement of foods.  We reviewed in detail portion sizes of common foods and developed a meal plan that involves carb portions per each meal. Stressed importance of eating 3 balanced meals per day and reasonable snacks if needed.  Discussed tips for eating out. Emphasized importance of eliminating all sugary soft drinks, limiting consumption of fruit juices and sugar free drink options.      Reviewed label reading and how to determine appropriate serving sizes of specific carb containing foods. Recommended carb amounts per meal discussed, see below.    Physical Activity (incorporating physical activity into one's lifestyle): Discussion, Comprehends Key Points, Written Materials  Provided  Medications (states correct name, dose, onset, peak, duration, side effects & timing of meds): Discussion, Comprehends Key Points, Written Materials Provided              -MOA of orals discussed    Monitoring (monitoring blood glucose/other parameters & using results): Discussion, Comprehends Key Points, Written Materials Provided           Advised pt he can check glucose 2-3 x per day              - encouraged to bring bg logs/meter to all Endo/PCP/ed visits     Acute Complications (preventing, detecting, and treating acute complications): Discussion, Comprehends Key Points, Written Materials Provided              - s/s hypoglycemia and treatment of same              - advised to carry a source of fast acting carbs at all times     Chronic Complications (preventing, detecting, and treating chronic complications): Discussion, Comprehends Key Points, Written Materials Provided  Clinical (diabetes, other pertinent medical history, and relevant comorbidities reviewed during visit): Discussion, Comprehends Key Points, Written Materials Provided  Cognitive (knowledge of self-management skills, functional health literacy): Discussion, Comprehends Key Points, Written Materials Provided  Psychosocial (emotional response to diabetes): Discussion, Individual Session  Diabetes Distress and Support Systems: Discussion, Individual Session  Behavioral (readiness for change, lifestyle practices, self-care behaviors): Discussion, Individual Session  Appears motivated to make recommended changes.  Psychosocial (emotional response to diabetes): Discussion, Individual Session  Diabetes Distress and Support Systems: Discussion, Individual Session  Behavioral (readiness for change, lifestyle practices, self-care behaviors): Discussion, Individual Session    Goals  Patient has selected/evaluated goals during today's session: No      Diabetes Meal Plan  Restrictions: Restricted Carbohydrate  Carbohydrate Per Meal:  45-60g  Carbohydrate Per Snack : 15-20g    Today's Self-Management Care Plan was developed with the patient's input and is based on barriers identified during today's assessment.    The long and short-term goals in the care plan were written with the patient/caregiver's input. The patient has agreed to work toward these goals to improve his overall diabetes control.      The patient received a copy of today's self-management plan and verbalized understanding of the care plan, goals, and all of today's instructions.      The patient was encouraged to communicate with his physician and care team regarding his condition(s) and treatment.  I provided the patient with my contact information today and encouraged him to contact me via phone or patient portal as needed.     Education Units of Time   Time Spent: 60 min

## 2019-10-25 ENCOUNTER — OFFICE VISIT (OUTPATIENT)
Dept: INTERNAL MEDICINE | Facility: CLINIC | Age: 64
End: 2019-10-25
Payer: MEDICARE

## 2019-10-25 VITALS
DIASTOLIC BLOOD PRESSURE: 76 MMHG | BODY MASS INDEX: 28.77 KG/M2 | HEIGHT: 66 IN | SYSTOLIC BLOOD PRESSURE: 122 MMHG | TEMPERATURE: 98 F | WEIGHT: 179 LBS | HEART RATE: 55 BPM | OXYGEN SATURATION: 97 %

## 2019-10-25 DIAGNOSIS — I48.91 ATRIAL FIBRILLATION, UNSPECIFIED TYPE: ICD-10-CM

## 2019-10-25 DIAGNOSIS — K75.81 STEATOHEPATITIS: ICD-10-CM

## 2019-10-25 DIAGNOSIS — E11.65 TYPE 2 DIABETES MELLITUS WITH HYPERGLYCEMIA, WITHOUT LONG-TERM CURRENT USE OF INSULIN: Primary | ICD-10-CM

## 2019-10-25 DIAGNOSIS — E78.5 HYPERLIPIDEMIA ASSOCIATED WITH TYPE 2 DIABETES MELLITUS: ICD-10-CM

## 2019-10-25 DIAGNOSIS — M65.312 TRIGGER FINGER OF LEFT THUMB: ICD-10-CM

## 2019-10-25 DIAGNOSIS — E11.69 HYPERLIPIDEMIA ASSOCIATED WITH TYPE 2 DIABETES MELLITUS: ICD-10-CM

## 2019-10-25 PROCEDURE — 3074F SYST BP LT 130 MM HG: CPT | Mod: HCNC,CPTII,S$GLB, | Performed by: INTERNAL MEDICINE

## 2019-10-25 PROCEDURE — 3046F HEMOGLOBIN A1C LEVEL >9.0%: CPT | Mod: HCNC,CPTII,S$GLB, | Performed by: INTERNAL MEDICINE

## 2019-10-25 PROCEDURE — 3046F PR MOST RECENT HEMOGLOBIN A1C LEVEL > 9.0%: ICD-10-PCS | Mod: HCNC,CPTII,S$GLB, | Performed by: INTERNAL MEDICINE

## 2019-10-25 PROCEDURE — 99499 UNLISTED E&M SERVICE: CPT | Mod: HCNC,S$GLB,, | Performed by: INTERNAL MEDICINE

## 2019-10-25 PROCEDURE — 99999 PR PBB SHADOW E&M-EST. PATIENT-LVL IV: CPT | Mod: PBBFAC,HCNC,, | Performed by: INTERNAL MEDICINE

## 2019-10-25 PROCEDURE — 1101F PT FALLS ASSESS-DOCD LE1/YR: CPT | Mod: HCNC,CPTII,S$GLB, | Performed by: INTERNAL MEDICINE

## 2019-10-25 PROCEDURE — 3078F DIAST BP <80 MM HG: CPT | Mod: HCNC,CPTII,S$GLB, | Performed by: INTERNAL MEDICINE

## 2019-10-25 PROCEDURE — 1101F PR PT FALLS ASSESS DOC 0-1 FALLS W/OUT INJ PAST YR: ICD-10-PCS | Mod: HCNC,CPTII,S$GLB, | Performed by: INTERNAL MEDICINE

## 2019-10-25 PROCEDURE — 99214 OFFICE O/P EST MOD 30 MIN: CPT | Mod: HCNC,S$GLB,, | Performed by: INTERNAL MEDICINE

## 2019-10-25 PROCEDURE — 99499 RISK ADDL DX/OHS AUDIT: ICD-10-PCS | Mod: HCNC,S$GLB,, | Performed by: INTERNAL MEDICINE

## 2019-10-25 PROCEDURE — 99214 PR OFFICE/OUTPT VISIT, EST, LEVL IV, 30-39 MIN: ICD-10-PCS | Mod: HCNC,S$GLB,, | Performed by: INTERNAL MEDICINE

## 2019-10-25 PROCEDURE — 3078F PR MOST RECENT DIASTOLIC BLOOD PRESSURE < 80 MM HG: ICD-10-PCS | Mod: HCNC,CPTII,S$GLB, | Performed by: INTERNAL MEDICINE

## 2019-10-25 PROCEDURE — 3008F PR BODY MASS INDEX (BMI) DOCUMENTED: ICD-10-PCS | Mod: HCNC,CPTII,S$GLB, | Performed by: INTERNAL MEDICINE

## 2019-10-25 PROCEDURE — 3074F PR MOST RECENT SYSTOLIC BLOOD PRESSURE < 130 MM HG: ICD-10-PCS | Mod: HCNC,CPTII,S$GLB, | Performed by: INTERNAL MEDICINE

## 2019-10-25 PROCEDURE — 3008F BODY MASS INDEX DOCD: CPT | Mod: HCNC,CPTII,S$GLB, | Performed by: INTERNAL MEDICINE

## 2019-10-25 PROCEDURE — 99999 PR PBB SHADOW E&M-EST. PATIENT-LVL IV: ICD-10-PCS | Mod: PBBFAC,HCNC,, | Performed by: INTERNAL MEDICINE

## 2019-10-25 NOTE — PROGRESS NOTES
CC:  Follow-up.    HPI:  The patient is a 64-year-old gentleman who we see for hypertension, hyperlipidemia, AFib, diabetes who presents today for follow-up.  He does have a history of alcohol abuse.  He is currently alcohol free.  He does problems with anxiety.  His diabetes became difficult to control secondary to Remeron.  He reports his medication caused him to eat excessively especially 6 weeks.  He is now on this medication is doing much better.  He was seen by endocrinology.  He is on metformin 500 mg extended release.  He is taking 4 tablets a day.  His sugars are doing better.  There in the 120-170 range before meals and about 230 after meals.    ROS:  Patient does complain his legs jump in his best his right leg.  If he applies Voltaren gel to his knee, the rest his leg resolved.  He does complain of significant pain involving the knee which is improved with Voltaren gel.  He can flex it better is still not 100%.  He did get a stationary bike to start exercising.  He does complain of pain involving the base of the left thumb the the left thumb also gets stuck.    Physical exam:  General appearance:  No acute distress.  HEENT:  Trachea is midline without JVD.  Pulmonary:  Good inspiratory, expiratory breath sounds are heard.  Lungs are clear to auscultation.  Cardiovascular:  S1-S2, rhythm appears to be normal.  Extremities:  Without edema.  GI:  Abdomen nontender nondistended without hepatosplenomegaly.    Assessment:  1.  Non insulin-dependent diabetes  2.  Restless leg  3.  Trigger finger involving left  4.  AFib currently anticoagulated on Eliquis    Plan:  1.  Will add a CBC to the patient's upcoming blood tests.  2.  We will refer the patient to Orthopedics his.  He has  3.  The patient continue use Voltaren gel.

## 2019-10-29 DIAGNOSIS — M25.561 RIGHT KNEE PAIN, UNSPECIFIED CHRONICITY: Primary | ICD-10-CM

## 2019-10-29 RX ORDER — ALLOPURINOL 100 MG/1
TABLET ORAL
Qty: 180 TABLET | Refills: 0 | OUTPATIENT
Start: 2019-10-29

## 2019-10-29 RX ORDER — ALLOPURINOL 100 MG/1
TABLET ORAL
Qty: 60 TABLET | Refills: 0 | Status: SHIPPED | OUTPATIENT
Start: 2019-10-29 | End: 2019-12-13 | Stop reason: SDUPTHER

## 2019-11-01 ENCOUNTER — LAB VISIT (OUTPATIENT)
Dept: LAB | Facility: HOSPITAL | Age: 64
End: 2019-11-01
Attending: INTERNAL MEDICINE
Payer: MEDICARE

## 2019-11-01 DIAGNOSIS — I48.91 ATRIAL FIBRILLATION, UNSPECIFIED TYPE: ICD-10-CM

## 2019-11-01 DIAGNOSIS — E78.1 HYPERTRIGLYCERIDEMIA: ICD-10-CM

## 2019-11-01 DIAGNOSIS — E11.65 TYPE 2 DIABETES MELLITUS WITH HYPERGLYCEMIA, WITHOUT LONG-TERM CURRENT USE OF INSULIN: ICD-10-CM

## 2019-11-01 LAB
ALBUMIN SERPL BCP-MCNC: 4.3 G/DL (ref 3.5–5.2)
ALP SERPL-CCNC: 83 U/L (ref 55–135)
ALT SERPL W/O P-5'-P-CCNC: 12 U/L (ref 10–44)
ANION GAP SERPL CALC-SCNC: 11 MMOL/L (ref 8–16)
AST SERPL-CCNC: 17 U/L (ref 10–40)
BASOPHILS # BLD AUTO: 0.06 K/UL (ref 0–0.2)
BASOPHILS NFR BLD: 0.7 % (ref 0–1.9)
BILIRUB SERPL-MCNC: 0.3 MG/DL (ref 0.1–1)
BUN SERPL-MCNC: 14 MG/DL (ref 8–23)
CALCIUM SERPL-MCNC: 9.8 MG/DL (ref 8.7–10.5)
CHLORIDE SERPL-SCNC: 102 MMOL/L (ref 95–110)
CHOLEST SERPL-MCNC: 121 MG/DL (ref 120–199)
CHOLEST/HDLC SERPL: 5 {RATIO} (ref 2–5)
CO2 SERPL-SCNC: 27 MMOL/L (ref 23–29)
CREAT SERPL-MCNC: 1.2 MG/DL (ref 0.5–1.4)
DIFFERENTIAL METHOD: ABNORMAL
EOSINOPHIL # BLD AUTO: 0.2 K/UL (ref 0–0.5)
EOSINOPHIL NFR BLD: 2.6 % (ref 0–8)
ERYTHROCYTE [DISTWIDTH] IN BLOOD BY AUTOMATED COUNT: 14 % (ref 11.5–14.5)
EST. GFR  (AFRICAN AMERICAN): >60 ML/MIN/1.73 M^2
EST. GFR  (NON AFRICAN AMERICAN): >60 ML/MIN/1.73 M^2
GLUCOSE SERPL-MCNC: 147 MG/DL (ref 70–110)
HCT VFR BLD AUTO: 43.5 % (ref 40–54)
HDLC SERPL-MCNC: 24 MG/DL (ref 40–75)
HDLC SERPL: 19.8 % (ref 20–50)
HGB BLD-MCNC: 14.4 G/DL (ref 14–18)
LDLC SERPL CALC-MCNC: 79.4 MG/DL (ref 63–159)
LYMPHOCYTES # BLD AUTO: 2.4 K/UL (ref 1–4.8)
LYMPHOCYTES NFR BLD: 27.3 % (ref 18–48)
MCH RBC QN AUTO: 28.5 PG (ref 27–31)
MCHC RBC AUTO-ENTMCNC: 33.1 G/DL (ref 32–36)
MCV RBC AUTO: 86 FL (ref 82–98)
MONOCYTES # BLD AUTO: 0.8 K/UL (ref 0.3–1)
MONOCYTES NFR BLD: 8.7 % (ref 4–15)
NEUTROPHILS # BLD AUTO: 5.2 K/UL (ref 1.8–7.7)
NEUTROPHILS NFR BLD: 60.7 % (ref 38–73)
NONHDLC SERPL-MCNC: 97 MG/DL
PLATELET # BLD AUTO: 217 K/UL (ref 150–350)
PMV BLD AUTO: 9 FL (ref 9.2–12.9)
POTASSIUM SERPL-SCNC: 4 MMOL/L (ref 3.5–5.1)
PROT SERPL-MCNC: 7.6 G/DL (ref 6–8.4)
RBC # BLD AUTO: 5.05 M/UL (ref 4.6–6.2)
SODIUM SERPL-SCNC: 140 MMOL/L (ref 136–145)
TRIGL SERPL-MCNC: 88 MG/DL (ref 30–150)
WBC # BLD AUTO: 8.62 K/UL (ref 3.9–12.7)

## 2019-11-01 PROCEDURE — 85025 COMPLETE CBC W/AUTO DIFF WBC: CPT | Mod: HCNC

## 2019-11-01 PROCEDURE — 80053 COMPREHEN METABOLIC PANEL: CPT | Mod: HCNC

## 2019-11-01 PROCEDURE — 80061 LIPID PANEL: CPT | Mod: HCNC

## 2019-11-01 PROCEDURE — 36415 COLL VENOUS BLD VENIPUNCTURE: CPT | Mod: HCNC

## 2019-11-02 ENCOUNTER — PATIENT OUTREACH (OUTPATIENT)
Dept: ADMINISTRATIVE | Facility: OTHER | Age: 64
End: 2019-11-02

## 2019-11-04 ENCOUNTER — HOSPITAL ENCOUNTER (OUTPATIENT)
Dept: RADIOLOGY | Facility: HOSPITAL | Age: 64
Discharge: HOME | End: 2019-11-04
Attending: ORTHOPAEDIC SURGERY
Payer: MEDICARE

## 2019-11-04 ENCOUNTER — OFFICE VISIT (OUTPATIENT)
Dept: ORTHOPEDICS | Facility: CLINIC | Age: 64
End: 2019-11-04
Payer: MEDICARE

## 2019-11-04 ENCOUNTER — TELEPHONE (OUTPATIENT)
Dept: CARDIOLOGY | Facility: CLINIC | Age: 64
End: 2019-11-04

## 2019-11-04 VITALS — BODY MASS INDEX: 29.32 KG/M2 | HEIGHT: 66 IN | WEIGHT: 182.44 LBS

## 2019-11-04 DIAGNOSIS — M25.561 CHRONIC PAIN OF RIGHT KNEE: ICD-10-CM

## 2019-11-04 DIAGNOSIS — E78.2 MIXED HYPERLIPIDEMIA: Primary | ICD-10-CM

## 2019-11-04 DIAGNOSIS — M25.561 RIGHT KNEE PAIN, UNSPECIFIED CHRONICITY: ICD-10-CM

## 2019-11-04 DIAGNOSIS — G89.29 CHRONIC PAIN OF RIGHT KNEE: ICD-10-CM

## 2019-11-04 DIAGNOSIS — Z96.651 STATUS POST RIGHT KNEE REPLACEMENT: Primary | ICD-10-CM

## 2019-11-04 PROCEDURE — 3008F BODY MASS INDEX DOCD: CPT | Mod: HCNC,CPTII,S$GLB, | Performed by: ORTHOPAEDIC SURGERY

## 2019-11-04 PROCEDURE — 73560 XR KNEE ORTHO RIGHT: ICD-10-PCS | Mod: 26,59,HCNC,LT | Performed by: RADIOLOGY

## 2019-11-04 PROCEDURE — 99999 PR PBB SHADOW E&M-EST. PATIENT-LVL III: CPT | Mod: PBBFAC,HCNC,, | Performed by: ORTHOPAEDIC SURGERY

## 2019-11-04 PROCEDURE — 1101F PR PT FALLS ASSESS DOC 0-1 FALLS W/OUT INJ PAST YR: ICD-10-PCS | Mod: HCNC,CPTII,S$GLB, | Performed by: ORTHOPAEDIC SURGERY

## 2019-11-04 PROCEDURE — 3008F PR BODY MASS INDEX (BMI) DOCUMENTED: ICD-10-PCS | Mod: HCNC,CPTII,S$GLB, | Performed by: ORTHOPAEDIC SURGERY

## 2019-11-04 PROCEDURE — 1101F PT FALLS ASSESS-DOCD LE1/YR: CPT | Mod: HCNC,CPTII,S$GLB, | Performed by: ORTHOPAEDIC SURGERY

## 2019-11-04 PROCEDURE — 99999 PR PBB SHADOW E&M-EST. PATIENT-LVL III: ICD-10-PCS | Mod: PBBFAC,HCNC,, | Performed by: ORTHOPAEDIC SURGERY

## 2019-11-04 PROCEDURE — 73562 X-RAY EXAM OF KNEE 3: CPT | Mod: 26,HCNC,RT, | Performed by: RADIOLOGY

## 2019-11-04 PROCEDURE — 99213 PR OFFICE/OUTPT VISIT, EST, LEVL III, 20-29 MIN: ICD-10-PCS | Mod: HCNC,S$GLB,, | Performed by: ORTHOPAEDIC SURGERY

## 2019-11-04 PROCEDURE — 73562 XR KNEE ORTHO RIGHT: ICD-10-PCS | Mod: 26,HCNC,RT, | Performed by: RADIOLOGY

## 2019-11-04 PROCEDURE — 73560 X-RAY EXAM OF KNEE 1 OR 2: CPT | Mod: 26,59,HCNC,LT | Performed by: RADIOLOGY

## 2019-11-04 PROCEDURE — 73560 X-RAY EXAM OF KNEE 1 OR 2: CPT | Mod: 59,TC,HCNC,LT

## 2019-11-04 PROCEDURE — 73562 X-RAY EXAM OF KNEE 3: CPT | Mod: TC,HCNC,RT

## 2019-11-04 PROCEDURE — 99213 OFFICE O/P EST LOW 20 MIN: CPT | Mod: HCNC,S$GLB,, | Performed by: ORTHOPAEDIC SURGERY

## 2019-11-04 NOTE — PROGRESS NOTES
"Subjective:      Patient ID: Donald Warren Abadie is a 64 y.o. male.    Chief Complaint: Follow-up of the Right Knee    HPI  Donald Warren Abadie has minimal right knee pain.  The pain has significantly improved. The pain is located in the medial, lateral aspect of the knee.  There  is not radiation. There is not associated stiffness.   There is not catching and locking. The pain is described as minimal. The pain is aggravated by activity.  It is alleviated by rest.  There is not associated back pain.  Her history, medications and problem list were reviewed.    Review of Systems   Constitution: Negative for chills, fever and night sweats.   HENT: Negative for hearing loss.    Eyes: Negative for blurred vision and double vision.   Cardiovascular: Negative for chest pain, claudication and leg swelling.   Respiratory: Negative for shortness of breath.    Endocrine: Negative for polydipsia, polyphagia and polyuria.   Hematologic/Lymphatic: Negative for adenopathy and bleeding problem. Does not bruise/bleed easily.   Skin: Negative for poor wound healing.   Musculoskeletal: Positive for joint pain.   Gastrointestinal: Negative for diarrhea and heartburn.   Genitourinary: Negative for bladder incontinence.   Neurological: Negative for focal weakness, headaches, numbness, paresthesias and sensory change.   Psychiatric/Behavioral: The patient is not nervous/anxious.    Allergic/Immunologic: Negative for persistent infections.         Objective:      Body mass index is 29.45 kg/m².  Vitals:    11/04/19 1613   Weight: 82.7 kg (182 lb 6.9 oz)   Height: 5' 6" (1.676 m)           General    Constitutional: He is oriented to person, place, and time. He appears well-developed and well-nourished.   HENT:   Head: Normocephalic and atraumatic.   Eyes: EOM are normal.   Cardiovascular: Normal rate.    Pulmonary/Chest: Effort normal.   Neurological: He is alert and oriented to person, place, and time.   Psychiatric: He has a normal " mood and affect. His behavior is normal.     General Musculoskeletal Exam   Gait: normal       Right Knee Exam     Inspection   Erythema: absent  Scars: present  Swelling: present (minimal)  Effusion: absent  Deformity: absent  Bruising: absent    Tenderness   The patient is tender to palpation of the medial retinaculum and lateral retinaculum.    Range of Motion   Extension: 0   Flexion: 130     Tests   Ligament Examination Lachman: normal (-1 to 2mm)   MCL - Valgus: normal (0 to 2mm)  LCL - Varus: normal  Patella   Passive Patellar Tilt: neutral    Other   Sensation: normal    Left Knee Exam     Inspection   Erythema: absent  Scars: absent  Swelling: absent  Effusion: absent  Deformity: absent  Bruising: absent    Tenderness   The patient is experiencing no tenderness.     Range of Motion   Extension: 0   Flexion: 130     Tests   Stability Lachman: normal (-1 to 2mm)   MCL - Valgus: normal (0 to 2mm)  LCL - Varus: normal (0 to 2mm)  Patella   Passive Patellar Tilt: neutral    Other   Sensation: normal    Muscle Strength   Right Lower Extremity   Hip Abduction: 5/5   Quadriceps:  5/5   Hamstrin/5   Left Lower Extremity   Hip Abduction: 5/5   Quadriceps:  5/5   Hamstrin/5     Reflexes     Left Side  Quadriceps:  2+    Right Side   Quadriceps:  2+    Vascular Exam     Right Pulses  Dorsalis Pedis:      2+          Left Pulses  Dorsalis Pedis:      2+          Edema  Right Lower Leg: absent  Left Lower Leg: absent              Assessment:       Encounter Diagnoses   Name Primary?    Status post right knee replacement Yes    Chronic pain of right knee           Plan:       Kleber was seen today for follow-up.    Diagnoses and all orders for this visit:    Status post right knee replacement    Chronic pain of right knee        He has turned the corner.  F/U in 2-3 months. Sooner if needed

## 2019-11-05 NOTE — TELEPHONE ENCOUNTER
Called patient, explained that lipids were better controlled, continue current dose of Crestor, moving from the daytime to the nighttime.    He also asked about fenofibrate, reports that he started on this medication many years ago when he was drinking and triglycerides were in the 500s.  Questions as to whether not he still needs it.    Agree with this evaluation, will stop fenofibrate, repeat lipids in CMP on just Crestor taken at night, in 2 months.    -Uriel Tolentino

## 2019-11-07 ENCOUNTER — OFFICE VISIT (OUTPATIENT)
Dept: PODIATRY | Facility: CLINIC | Age: 64
End: 2019-11-07
Payer: MEDICARE

## 2019-11-07 VITALS
DIASTOLIC BLOOD PRESSURE: 68 MMHG | WEIGHT: 182 LBS | SYSTOLIC BLOOD PRESSURE: 130 MMHG | HEART RATE: 63 BPM | HEIGHT: 67 IN | BODY MASS INDEX: 28.56 KG/M2

## 2019-11-07 DIAGNOSIS — E11.65 TYPE 2 DIABETES MELLITUS WITH HYPERGLYCEMIA, WITHOUT LONG-TERM CURRENT USE OF INSULIN: ICD-10-CM

## 2019-11-07 DIAGNOSIS — B35.1 ONYCHOMYCOSIS DUE TO DERMATOPHYTE: ICD-10-CM

## 2019-11-07 DIAGNOSIS — E11.42 DIABETIC POLYNEUROPATHY ASSOCIATED WITH TYPE 2 DIABETES MELLITUS: Primary | ICD-10-CM

## 2019-11-07 PROCEDURE — 11721 PR DEBRIDEMENT OF NAILS, 6 OR MORE: ICD-10-PCS | Mod: Q9,HCNC,S$GLB, | Performed by: PODIATRIST

## 2019-11-07 PROCEDURE — 3075F SYST BP GE 130 - 139MM HG: CPT | Mod: HCNC,CPTII,S$GLB, | Performed by: PODIATRIST

## 2019-11-07 PROCEDURE — 99499 UNLISTED E&M SERVICE: CPT | Mod: HCNC,S$GLB,, | Performed by: PODIATRIST

## 2019-11-07 PROCEDURE — 3046F HEMOGLOBIN A1C LEVEL >9.0%: CPT | Mod: HCNC,CPTII,S$GLB, | Performed by: PODIATRIST

## 2019-11-07 PROCEDURE — 3008F BODY MASS INDEX DOCD: CPT | Mod: HCNC,CPTII,S$GLB, | Performed by: PODIATRIST

## 2019-11-07 PROCEDURE — 99203 PR OFFICE/OUTPT VISIT, NEW, LEVL III, 30-44 MIN: ICD-10-PCS | Mod: HCNC,25,S$GLB, | Performed by: PODIATRIST

## 2019-11-07 PROCEDURE — 99999 PR PBB SHADOW E&M-EST. PATIENT-LVL III: CPT | Mod: PBBFAC,HCNC,, | Performed by: PODIATRIST

## 2019-11-07 PROCEDURE — 99203 OFFICE O/P NEW LOW 30 MIN: CPT | Mod: HCNC,25,S$GLB, | Performed by: PODIATRIST

## 2019-11-07 PROCEDURE — 99999 PR PBB SHADOW E&M-EST. PATIENT-LVL III: ICD-10-PCS | Mod: PBBFAC,HCNC,, | Performed by: PODIATRIST

## 2019-11-07 PROCEDURE — 3078F DIAST BP <80 MM HG: CPT | Mod: HCNC,CPTII,S$GLB, | Performed by: PODIATRIST

## 2019-11-07 PROCEDURE — 3075F PR MOST RECENT SYSTOLIC BLOOD PRESS GE 130-139MM HG: ICD-10-PCS | Mod: HCNC,CPTII,S$GLB, | Performed by: PODIATRIST

## 2019-11-07 PROCEDURE — 3046F PR MOST RECENT HEMOGLOBIN A1C LEVEL > 9.0%: ICD-10-PCS | Mod: HCNC,CPTII,S$GLB, | Performed by: PODIATRIST

## 2019-11-07 PROCEDURE — 1101F PT FALLS ASSESS-DOCD LE1/YR: CPT | Mod: HCNC,CPTII,S$GLB, | Performed by: PODIATRIST

## 2019-11-07 PROCEDURE — 99499 RISK ADDL DX/OHS AUDIT: ICD-10-PCS | Mod: HCNC,S$GLB,, | Performed by: PODIATRIST

## 2019-11-07 PROCEDURE — 3008F PR BODY MASS INDEX (BMI) DOCUMENTED: ICD-10-PCS | Mod: HCNC,CPTII,S$GLB, | Performed by: PODIATRIST

## 2019-11-07 PROCEDURE — 1101F PR PT FALLS ASSESS DOC 0-1 FALLS W/OUT INJ PAST YR: ICD-10-PCS | Mod: HCNC,CPTII,S$GLB, | Performed by: PODIATRIST

## 2019-11-07 PROCEDURE — 11721 DEBRIDE NAIL 6 OR MORE: CPT | Mod: Q9,HCNC,S$GLB, | Performed by: PODIATRIST

## 2019-11-07 PROCEDURE — 3078F PR MOST RECENT DIASTOLIC BLOOD PRESSURE < 80 MM HG: ICD-10-PCS | Mod: HCNC,CPTII,S$GLB, | Performed by: PODIATRIST

## 2019-11-07 RX ORDER — GLIPIZIDE 5 MG/1
TABLET ORAL
Qty: 30 TABLET | Refills: 0 | OUTPATIENT
Start: 2019-11-07

## 2019-11-07 NOTE — LETTER
November 13, 2019      Celia Martinez MD  [address]  [place] [address]           [facility] - Podiatry  [address]  [facility] [address]  Phone: [phone]          Patient: Donald Warren Abadie   MR Number: [id number]   YOB: 1955   Date of Visit: 11/7/2019       Dear Dr. Celia Martinez:    Thank you for referring Donald Abadie to me for evaluation. Attached you will find relevant portions of my assessment and plan of care.    If you have questions, please do not hesitate to call me. I look forward to following Donald Abadie along with you.    Sincerely,    Chris Manning, SHRUTHI    Enclosure  CC:  No Recipients    If you would like to receive this communication electronically, please contact [email or web address] or [phone] to request more information on [employer] Link access.    For providers and/or their staff who would like to refer a patient to [facility], please contact us through our one-stop-shop provider referral line, [facility], at [phone].    If you feel you have received this communication in error or would no longer like to receive these types of communications, please e-mail [email or web address]

## 2019-11-13 NOTE — PROGRESS NOTES
Subjective:      Patient ID: Donald Warren Abadie is a 64 y.o. male.    Chief Complaint: Diabetes Mellitus (10/25/2019 dr bacilio mercado) and Diabetic Foot Exam    Kleber is a 64 y.o. male who presents to the clinic for evaluation and treatment of high risk feet. Kleber has a past medical history of A-fib, Alcohol abuse, Anxiety, Arthritis, Chronic gout, Diabetes mellitus, DM (diabetes mellitus) (11/16/2016), Fatty liver, Hyperlipidemia, Renal cell cancer (2014), and S/P TKR (total knee replacement), right (6/27/2019). The patient's chief complaint is long, thick toenails. This patient has documented high risk feet requiring routine maintenance secondary to peripheral neuropathy.    PCP: Bacilio Mercado MD    Date Last Seen by PCP: 10/25/2019 dr bacilio mercado    Current shoe gear:  Affected Foot: Tennis shoes     Unaffected Foot: Tennis shoes    Hemoglobin A1C   Date Value Ref Range Status   10/07/2019 9.4 (H) 4.0 - 5.6 % Final     Comment:     ADA Screening Guidelines:  5.7-6.4%  Consistent with prediabetes  >or=6.5%  Consistent with diabetes  High levels of fetal hemoglobin interfere with the HbA1C  assay. Heterozygous hemoglobin variants (HbS, HgC, etc)do  not significantly interfere with this assay.   However, presence of multiple variants may affect accuracy.     06/21/2019 7.9 (H) 4.0 - 5.6 % Final     Comment:     ADA Screening Guidelines:  5.7-6.4%  Consistent with prediabetes  >or=6.5%  Consistent with diabetes  High levels of fetal hemoglobin interfere with the HbA1C  assay. Heterozygous hemoglobin variants (HbS, HgC, etc)do  not significantly interfere with this assay.   However, presence of multiple variants may affect accuracy.     04/09/2019 6.5 (H) 4.0 - 5.6 % Final     Comment:     ADA Screening Guidelines:  5.7-6.4%  Consistent with prediabetes  >or=6.5%  Consistent with diabetes  High levels of fetal hemoglobin interfere with the HbA1C  assay. Heterozygous hemoglobin variants (HbS, HgC, etc)do  not  significantly interfere with this assay.   However, presence of multiple variants may affect accuracy.         Review of Systems   Constitution: Negative for chills, fever and malaise/fatigue.   HENT: Negative for hearing loss.    Cardiovascular: Negative for claudication.   Respiratory: Negative for shortness of breath.    Skin: Positive for color change and nail changes. Negative for flushing and rash.   Musculoskeletal: Negative for joint pain and myalgias.   Neurological: Positive for numbness and paresthesias. Negative for loss of balance and sensory change.   Psychiatric/Behavioral: Negative for altered mental status.           Objective:      Physical Exam   Constitutional: He appears well-developed and well-nourished. He is cooperative.   Cardiovascular:   Pulses:       Dorsalis pedis pulses are 2+ on the right side, and 2+ on the left side.        Posterior tibial pulses are 2+ on the right side, and 2+ on the left side.   Musculoskeletal:        Right ankle: He exhibits decreased range of motion and abnormal pulse. Achilles tendon exhibits normal Christy's test results.        Left ankle: He exhibits decreased range of motion and abnormal pulse. Achilles tendon exhibits normal Christy's test results.        Right foot: There is decreased range of motion.        Left foot: There is decreased range of motion.   Feet:   Right Foot:   Protective Sensation: 5 sites tested. 2 sites sensed.   Left Foot:   Protective Sensation: 5 sites tested. 2 sites sensed.   Neurological: He is alert.   diminished sensation noted to b/L lower extremities   Skin: Skin is dry. Capillary refill takes more than 3 seconds.   Psychiatric: His behavior is normal.   Vitals reviewed.      Nails x10 are elongated by  4-5mm's, thickened by 2-3 mm's, dystrophic, and are yellowish in  coloration . Xerosis Bilaterally. No open lesions noted.             Assessment:       Encounter Diagnoses   Name Primary?    Diabetic polyneuropathy  associated with type 2 diabetes mellitus Yes    Onychomycosis due to dermatophyte          Plan:       Kleber was seen today for diabetes mellitus and diabetic foot exam.    Diagnoses and all orders for this visit:    Diabetic polyneuropathy associated with type 2 diabetes mellitus    Onychomycosis due to dermatophyte      I counseled the patient on his conditions, their implications and medical management.        - Shoe inspection. Diabetic Foot Education. Patient reminded of the importance of good nutrition and blood sugar control to help prevent podiatric complications of diabetes. Patient instructed on proper foot hygeine. We discussed wearing proper shoe gear, daily foot inspections, never walking without protective shoe gear, never putting sharp instruments to feet, routine podiatric nail visits every 2-3 months.      - With patient's permission, nails were aggressively reduced and debrided x 10 to their soft tissue attachment mechanically and with electric , removing all offending nail and debris. Patient relates relief following the procedure. He will continue to monitor the areas daily, inspect his feet, wear protective shoe gear when ambulatory, moisturizer to maintain skin integrity and follow in this office in approximately 2-3 months, sooner p.r.n.

## 2019-11-15 ENCOUNTER — TELEPHONE (OUTPATIENT)
Dept: ORTHOPEDICS | Facility: CLINIC | Age: 64
End: 2019-11-15

## 2019-11-15 DIAGNOSIS — M79.642 LEFT HAND PAIN: Primary | ICD-10-CM

## 2019-11-17 ENCOUNTER — PATIENT OUTREACH (OUTPATIENT)
Dept: ADMINISTRATIVE | Facility: OTHER | Age: 64
End: 2019-11-17

## 2019-11-18 DIAGNOSIS — E78.1 HYPERTRIGLYCERIDEMIA: ICD-10-CM

## 2019-11-18 RX ORDER — ROSUVASTATIN CALCIUM 20 MG/1
20 TABLET, COATED ORAL DAILY
Qty: 90 TABLET | Refills: 3 | Status: SHIPPED | OUTPATIENT
Start: 2019-11-18 | End: 2020-04-08 | Stop reason: SDUPTHER

## 2019-11-19 ENCOUNTER — OFFICE VISIT (OUTPATIENT)
Dept: ORTHOPEDICS | Facility: CLINIC | Age: 64
End: 2019-11-19
Payer: MEDICARE

## 2019-11-19 ENCOUNTER — HOSPITAL ENCOUNTER (OUTPATIENT)
Dept: RADIOLOGY | Facility: HOSPITAL | Age: 64
Discharge: HOME | End: 2019-11-19
Attending: ORTHOPAEDIC SURGERY
Payer: MEDICARE

## 2019-11-19 DIAGNOSIS — M79.642 LEFT HAND PAIN: ICD-10-CM

## 2019-11-19 DIAGNOSIS — M65.312 TRIGGER FINGER OF LEFT THUMB: Primary | ICD-10-CM

## 2019-11-19 PROCEDURE — 73130 X-RAY EXAM OF HAND: CPT | Mod: TC,HCNC,LT

## 2019-11-19 PROCEDURE — 99999 PR PBB SHADOW E&M-EST. PATIENT-LVL I: ICD-10-PCS | Mod: PBBFAC,HCNC,, | Performed by: ORTHOPAEDIC SURGERY

## 2019-11-19 PROCEDURE — 73130 XR HAND COMPLETE 3 VIEW LEFT: ICD-10-PCS | Mod: 26,HCNC,LT, | Performed by: RADIOLOGY

## 2019-11-19 PROCEDURE — 73130 X-RAY EXAM OF HAND: CPT | Mod: 26,HCNC,LT, | Performed by: RADIOLOGY

## 2019-11-19 PROCEDURE — 99203 PR OFFICE/OUTPT VISIT, NEW, LEVL III, 30-44 MIN: ICD-10-PCS | Mod: 25,HCNC,S$GLB, | Performed by: ORTHOPAEDIC SURGERY

## 2019-11-19 PROCEDURE — 99999 PR PBB SHADOW E&M-EST. PATIENT-LVL I: CPT | Mod: PBBFAC,HCNC,, | Performed by: ORTHOPAEDIC SURGERY

## 2019-11-19 PROCEDURE — 20550 TENDON SHEATH: L THUMB MCP: ICD-10-PCS | Mod: FA,HCNC,GC,S$GLB | Performed by: ORTHOPAEDIC SURGERY

## 2019-11-19 PROCEDURE — 20550 NJX 1 TENDON SHEATH/LIGAMENT: CPT | Mod: FA,HCNC,GC,S$GLB | Performed by: ORTHOPAEDIC SURGERY

## 2019-11-19 PROCEDURE — 99203 OFFICE O/P NEW LOW 30 MIN: CPT | Mod: 25,HCNC,S$GLB, | Performed by: ORTHOPAEDIC SURGERY

## 2019-11-19 PROCEDURE — 1101F PR PT FALLS ASSESS DOC 0-1 FALLS W/OUT INJ PAST YR: ICD-10-PCS | Mod: HCNC,CPTII,S$GLB, | Performed by: ORTHOPAEDIC SURGERY

## 2019-11-19 PROCEDURE — 1101F PT FALLS ASSESS-DOCD LE1/YR: CPT | Mod: HCNC,CPTII,S$GLB, | Performed by: ORTHOPAEDIC SURGERY

## 2019-11-19 RX ORDER — DEXAMETHASONE SODIUM PHOSPHATE 4 MG/ML
4 INJECTION, SOLUTION INTRA-ARTICULAR; INTRALESIONAL; INTRAMUSCULAR; INTRAVENOUS; SOFT TISSUE
Status: DISCONTINUED | OUTPATIENT
Start: 2019-11-19 | End: 2019-11-19 | Stop reason: HOSPADM

## 2019-11-19 RX ADMIN — DEXAMETHASONE SODIUM PHOSPHATE 4 MG: 4 INJECTION, SOLUTION INTRA-ARTICULAR; INTRALESIONAL; INTRAMUSCULAR; INTRAVENOUS; SOFT TISSUE at 01:11
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
## 2019-11-19 NOTE — PROCEDURES
Tendon Sheath: L thumb MCP  Date/Time: 11/19/2019 1:00 PM  Performed by: Brian Silveira MD  Authorized by: Brian Silveira MD     Consent Done?:  Yes (Verbal)  Timeout: prior to procedure the correct patient, procedure, and site was verified    Indications:  Pain  Timeout: prior to procedure the correct patient, procedure, and site was verified    Location:  Thumb  Site:  L thumb MCP  Prep: patient was prepped and draped in usual sterile fashion    Needle size:  25 G  Medications:  4 mg dexamethasone 4 mg/mL

## 2019-11-19 NOTE — LETTER
November 19, 2019      Bacilio Larry MD  [address]  [facility] [address]           [facility] - Orthopedics  [address]  [facility] [address]  Phone: [phone]          Patient: Donald Warren Abadie   MR Number: [id number]   YOB: 1955   Date of Visit: 11/19/2019       Dear Dr. Bacilio Larry:    Thank you for referring Donald Abadie to me for evaluation. Attached you will find relevant portions of my assessment and plan of care.    If you have questions, please do not hesitate to call me. I look forward to following Donald Abadie along with you.    Sincerely,    Jay Nogueira MD    Enclosure  CC:  No Recipients    If you would like to receive this communication electronically, please contact [email or web address] or [phone] to request more information on [employer] Link access.    For providers and/or their staff who would like to refer a patient to [facility], please contact us through our one-stop-shop provider referral line, [facility], at [phone].    If you feel you have received this communication in error or would no longer like to receive these types of communications, please e-mail [email or web address]

## 2019-12-03 RX ORDER — ALLOPURINOL 100 MG/1
200 TABLET ORAL DAILY
Qty: 60 TABLET | Refills: 0 | OUTPATIENT
Start: 2019-12-03

## 2019-12-11 ENCOUNTER — OFFICE VISIT (OUTPATIENT)
Dept: INTERNAL MEDICINE | Facility: CLINIC | Age: 64
End: 2019-12-11
Payer: MEDICARE

## 2019-12-11 VITALS
BODY MASS INDEX: 28.68 KG/M2 | WEIGHT: 182.75 LBS | OXYGEN SATURATION: 98 % | TEMPERATURE: 98 F | HEIGHT: 67 IN | SYSTOLIC BLOOD PRESSURE: 116 MMHG | DIASTOLIC BLOOD PRESSURE: 68 MMHG | HEART RATE: 59 BPM

## 2019-12-11 DIAGNOSIS — E11.9 TYPE 2 DIABETES MELLITUS WITHOUT COMPLICATION, WITH LONG-TERM CURRENT USE OF INSULIN: ICD-10-CM

## 2019-12-11 DIAGNOSIS — K21.9 GASTRIC REFLUX: Primary | ICD-10-CM

## 2019-12-11 DIAGNOSIS — Z79.4 TYPE 2 DIABETES MELLITUS WITHOUT COMPLICATION, WITH LONG-TERM CURRENT USE OF INSULIN: ICD-10-CM

## 2019-12-11 DIAGNOSIS — I48.91 ATRIAL FIBRILLATION, UNSPECIFIED TYPE: ICD-10-CM

## 2019-12-11 DIAGNOSIS — G25.81 RESTLESS LEG: ICD-10-CM

## 2019-12-11 PROCEDURE — 99214 OFFICE O/P EST MOD 30 MIN: CPT | Mod: HCNC,S$GLB,, | Performed by: INTERNAL MEDICINE

## 2019-12-11 PROCEDURE — 3008F PR BODY MASS INDEX (BMI) DOCUMENTED: ICD-10-PCS | Mod: HCNC,CPTII,S$GLB, | Performed by: INTERNAL MEDICINE

## 2019-12-11 PROCEDURE — 99999 PR PBB SHADOW E&M-EST. PATIENT-LVL III: ICD-10-PCS | Mod: PBBFAC,HCNC,, | Performed by: INTERNAL MEDICINE

## 2019-12-11 PROCEDURE — 99499 UNLISTED E&M SERVICE: CPT | Mod: HCNC,S$GLB,, | Performed by: INTERNAL MEDICINE

## 2019-12-11 PROCEDURE — 3074F SYST BP LT 130 MM HG: CPT | Mod: HCNC,CPTII,S$GLB, | Performed by: INTERNAL MEDICINE

## 2019-12-11 PROCEDURE — 99499 RISK ADDL DX/OHS AUDIT: ICD-10-PCS | Mod: HCNC,S$GLB,, | Performed by: INTERNAL MEDICINE

## 2019-12-11 PROCEDURE — 99999 PR PBB SHADOW E&M-EST. PATIENT-LVL III: CPT | Mod: PBBFAC,HCNC,, | Performed by: INTERNAL MEDICINE

## 2019-12-11 PROCEDURE — 3074F PR MOST RECENT SYSTOLIC BLOOD PRESSURE < 130 MM HG: ICD-10-PCS | Mod: HCNC,CPTII,S$GLB, | Performed by: INTERNAL MEDICINE

## 2019-12-11 PROCEDURE — 1101F PR PT FALLS ASSESS DOC 0-1 FALLS W/OUT INJ PAST YR: ICD-10-PCS | Mod: HCNC,CPTII,S$GLB, | Performed by: INTERNAL MEDICINE

## 2019-12-11 PROCEDURE — 99214 PR OFFICE/OUTPT VISIT, EST, LEVL IV, 30-39 MIN: ICD-10-PCS | Mod: HCNC,S$GLB,, | Performed by: INTERNAL MEDICINE

## 2019-12-11 PROCEDURE — 3078F DIAST BP <80 MM HG: CPT | Mod: HCNC,CPTII,S$GLB, | Performed by: INTERNAL MEDICINE

## 2019-12-11 PROCEDURE — 3078F PR MOST RECENT DIASTOLIC BLOOD PRESSURE < 80 MM HG: ICD-10-PCS | Mod: HCNC,CPTII,S$GLB, | Performed by: INTERNAL MEDICINE

## 2019-12-11 PROCEDURE — 1101F PT FALLS ASSESS-DOCD LE1/YR: CPT | Mod: HCNC,CPTII,S$GLB, | Performed by: INTERNAL MEDICINE

## 2019-12-11 PROCEDURE — 3046F HEMOGLOBIN A1C LEVEL >9.0%: CPT | Mod: HCNC,CPTII,S$GLB, | Performed by: INTERNAL MEDICINE

## 2019-12-11 PROCEDURE — 3046F PR MOST RECENT HEMOGLOBIN A1C LEVEL > 9.0%: ICD-10-PCS | Mod: HCNC,CPTII,S$GLB, | Performed by: INTERNAL MEDICINE

## 2019-12-11 PROCEDURE — 3008F BODY MASS INDEX DOCD: CPT | Mod: HCNC,CPTII,S$GLB, | Performed by: INTERNAL MEDICINE

## 2019-12-11 RX ORDER — ALLOPURINOL 100 MG/1
TABLET ORAL
Qty: 60 TABLET | Refills: 0 | OUTPATIENT
Start: 2019-12-11

## 2019-12-11 RX ORDER — ROPINIROLE 1 MG/1
1 TABLET, FILM COATED ORAL NIGHTLY
Qty: 30 TABLET | Refills: 11 | Status: SHIPPED | OUTPATIENT
Start: 2019-12-11 | End: 2021-12-14 | Stop reason: SDUPTHER

## 2019-12-11 RX ORDER — ALLOPURINOL 100 MG/1
200 TABLET ORAL DAILY
Qty: 60 TABLET | Refills: 0 | OUTPATIENT
Start: 2019-12-11

## 2019-12-11 NOTE — PROGRESS NOTES
CC:  Reflux.    HPI:  Patient is a 64-year-old gentleman who is currently being treated for atrial fibrillation, diabetes, renal cell cancer status post partial nephrectomy, gout, hypertension, elevated triglycerides and alcohol abuse presents today with complaints of reflux.  It occurs mostly at night.  The symptoms are relieved by belching.  If he lays on his right side, he will get discomfort in the right chest wall.  Patient does have a prescription for Protonix which he had not been taking.  He restart this medication approximately 2 weeks ago.  He was concerned this might represent cancer; but, it would go away with belching.    ROS:  Patient reports no bowel changes.  No black tarry stools.  Does state that when he belches, sometimes will get an acid taste in the back of her throat.  He does complain of his legs being achy and jumping at night.  He had taking gabapentin which worked great but he states it made him lose his hair.    Physical exam:  General appearance:  No acute distress.  HEENT:  Trachea is midline without JVD.  Pulmonary:  Good inspiratory, expiratory breath sounds are heard.  Lungs are clear auscultation.  Cardiovascular:  S1-S2, rhythm is normal.  Extremities:  Without edema  GI:  Abdomen was nontender, nondistended without hepatosplenomegaly  Comments:  Did discuss the the patient about Requip.  The patient is agreeable trying this also discussed with him about taking 300 mg of ranitidine at night and continue Protonix in the morning.    Assessment:  1.  Gastric reflux  2.  Restless leg syndrome  3.  History of alcohol abuse currently not drinking  4.  Non insulin-dependent diabetes  5.  Hypertension  6.  Elevated triglycerides.    Plan:   1.  Will start the patient on 300 mg ranitidine at night.  He is to continue with Protonix in the morning.  2.  Will start the patient on Requip 1 mg QHS.  3.  The patient to follow up in 1 month, sooner for any problems.

## 2019-12-13 RX ORDER — ALLOPURINOL 100 MG/1
200 TABLET ORAL DAILY
Qty: 60 TABLET | Refills: 1 | Status: SHIPPED | OUTPATIENT
Start: 2019-12-13 | End: 2020-01-06 | Stop reason: SDUPTHER

## 2019-12-18 DIAGNOSIS — I48.0 PAROXYSMAL ATRIAL FIBRILLATION: ICD-10-CM

## 2019-12-20 RX ORDER — FLECAINIDE ACETATE 100 MG/1
100 TABLET ORAL EVERY 12 HOURS
Qty: 180 TABLET | Refills: 3 | Status: SHIPPED | OUTPATIENT
Start: 2019-12-20 | End: 2020-07-14

## 2020-01-03 ENCOUNTER — PATIENT OUTREACH (OUTPATIENT)
Dept: ADMINISTRATIVE | Facility: OTHER | Age: 65
End: 2020-01-03

## 2020-01-06 ENCOUNTER — OFFICE VISIT (OUTPATIENT)
Dept: ORTHOPEDICS | Facility: CLINIC | Age: 65
End: 2020-01-06
Payer: MEDICARE

## 2020-01-06 VITALS — HEIGHT: 67 IN | BODY MASS INDEX: 28.89 KG/M2 | WEIGHT: 184.06 LBS

## 2020-01-06 DIAGNOSIS — G89.29 CHRONIC PAIN OF RIGHT KNEE: ICD-10-CM

## 2020-01-06 DIAGNOSIS — Z96.651 STATUS POST RIGHT KNEE REPLACEMENT: Primary | ICD-10-CM

## 2020-01-06 DIAGNOSIS — M25.561 CHRONIC PAIN OF RIGHT KNEE: ICD-10-CM

## 2020-01-06 PROCEDURE — 1101F PT FALLS ASSESS-DOCD LE1/YR: CPT | Mod: HCNC,CPTII,S$GLB, | Performed by: ORTHOPAEDIC SURGERY

## 2020-01-06 PROCEDURE — 3008F PR BODY MASS INDEX (BMI) DOCUMENTED: ICD-10-PCS | Mod: HCNC,CPTII,S$GLB, | Performed by: ORTHOPAEDIC SURGERY

## 2020-01-06 PROCEDURE — 99999 PR PBB SHADOW E&M-EST. PATIENT-LVL III: ICD-10-PCS | Mod: PBBFAC,HCNC,, | Performed by: ORTHOPAEDIC SURGERY

## 2020-01-06 PROCEDURE — 99999 PR PBB SHADOW E&M-EST. PATIENT-LVL III: CPT | Mod: PBBFAC,HCNC,, | Performed by: ORTHOPAEDIC SURGERY

## 2020-01-06 PROCEDURE — 3008F BODY MASS INDEX DOCD: CPT | Mod: HCNC,CPTII,S$GLB, | Performed by: ORTHOPAEDIC SURGERY

## 2020-01-06 PROCEDURE — 1101F PR PT FALLS ASSESS DOC 0-1 FALLS W/OUT INJ PAST YR: ICD-10-PCS | Mod: HCNC,CPTII,S$GLB, | Performed by: ORTHOPAEDIC SURGERY

## 2020-01-06 PROCEDURE — 99212 OFFICE O/P EST SF 10 MIN: CPT | Mod: HCNC,S$GLB,, | Performed by: ORTHOPAEDIC SURGERY

## 2020-01-06 PROCEDURE — 99212 PR OFFICE/OUTPT VISIT, EST, LEVL II, 10-19 MIN: ICD-10-PCS | Mod: HCNC,S$GLB,, | Performed by: ORTHOPAEDIC SURGERY

## 2020-01-06 RX ORDER — ALLOPURINOL 100 MG/1
200 TABLET ORAL DAILY
Qty: 60 TABLET | Refills: 0 | Status: SHIPPED | OUTPATIENT
Start: 2020-01-06 | End: 2020-01-20 | Stop reason: SDUPTHER

## 2020-01-06 RX ORDER — DICLOFENAC SODIUM 10 MG/G
2 GEL TOPICAL DAILY
Qty: 1 TUBE | Refills: 3 | Status: ON HOLD | OUTPATIENT
Start: 2020-01-06 | End: 2023-10-29 | Stop reason: SDUPTHER

## 2020-01-06 NOTE — PROGRESS NOTES
Donald Warren Abadie is in for 6 month follow up for a  Right TKA.  He is doing  well.  Minimal pain in the knee.  He has resumed activities of daily living. He has completed PT  Exam demonstrates  A well developed male in no distress.  Alert and oriented.  Mood and affect are appropriate.    Knee incision is well healed.  ROM is 0-125.  The patella tracks well and there is no instability. The extremity is neurovascularlMinimaly intact.    Xrays demonstrate a well fixed and positioned prosthesis.      Imp:Doing well  Refilled voltaren gel  F/u in 6 months with xrays and PROMS.

## 2020-01-08 ENCOUNTER — OFFICE VISIT (OUTPATIENT)
Dept: INTERNAL MEDICINE | Facility: CLINIC | Age: 65
End: 2020-01-08
Payer: MEDICARE

## 2020-01-08 VITALS
BODY MASS INDEX: 28.68 KG/M2 | WEIGHT: 182.75 LBS | HEIGHT: 67 IN | OXYGEN SATURATION: 97 % | SYSTOLIC BLOOD PRESSURE: 122 MMHG | TEMPERATURE: 98 F | DIASTOLIC BLOOD PRESSURE: 64 MMHG | HEART RATE: 61 BPM

## 2020-01-08 DIAGNOSIS — G25.81 RESTLESS LEG: ICD-10-CM

## 2020-01-08 DIAGNOSIS — E11.69 HYPERLIPIDEMIA ASSOCIATED WITH TYPE 2 DIABETES MELLITUS: ICD-10-CM

## 2020-01-08 DIAGNOSIS — I48.91 ATRIAL FIBRILLATION, UNSPECIFIED TYPE: ICD-10-CM

## 2020-01-08 DIAGNOSIS — M25.511 CHRONIC RIGHT SHOULDER PAIN: ICD-10-CM

## 2020-01-08 DIAGNOSIS — G89.29 CHRONIC RIGHT SHOULDER PAIN: ICD-10-CM

## 2020-01-08 DIAGNOSIS — E78.5 HYPERLIPIDEMIA ASSOCIATED WITH TYPE 2 DIABETES MELLITUS: ICD-10-CM

## 2020-01-08 DIAGNOSIS — K21.9 GASTRIC REFLUX: Primary | ICD-10-CM

## 2020-01-08 PROCEDURE — 3046F HEMOGLOBIN A1C LEVEL >9.0%: CPT | Mod: HCNC,CPTII,S$GLB, | Performed by: INTERNAL MEDICINE

## 2020-01-08 PROCEDURE — 3078F PR MOST RECENT DIASTOLIC BLOOD PRESSURE < 80 MM HG: ICD-10-PCS | Mod: HCNC,CPTII,S$GLB, | Performed by: INTERNAL MEDICINE

## 2020-01-08 PROCEDURE — 3008F PR BODY MASS INDEX (BMI) DOCUMENTED: ICD-10-PCS | Mod: HCNC,CPTII,S$GLB, | Performed by: INTERNAL MEDICINE

## 2020-01-08 PROCEDURE — 3046F PR MOST RECENT HEMOGLOBIN A1C LEVEL > 9.0%: ICD-10-PCS | Mod: HCNC,CPTII,S$GLB, | Performed by: INTERNAL MEDICINE

## 2020-01-08 PROCEDURE — 1101F PT FALLS ASSESS-DOCD LE1/YR: CPT | Mod: HCNC,CPTII,S$GLB, | Performed by: INTERNAL MEDICINE

## 2020-01-08 PROCEDURE — 99214 PR OFFICE/OUTPT VISIT, EST, LEVL IV, 30-39 MIN: ICD-10-PCS | Mod: HCNC,S$GLB,, | Performed by: INTERNAL MEDICINE

## 2020-01-08 PROCEDURE — 3074F PR MOST RECENT SYSTOLIC BLOOD PRESSURE < 130 MM HG: ICD-10-PCS | Mod: HCNC,CPTII,S$GLB, | Performed by: INTERNAL MEDICINE

## 2020-01-08 PROCEDURE — 3008F BODY MASS INDEX DOCD: CPT | Mod: HCNC,CPTII,S$GLB, | Performed by: INTERNAL MEDICINE

## 2020-01-08 PROCEDURE — 3078F DIAST BP <80 MM HG: CPT | Mod: HCNC,CPTII,S$GLB, | Performed by: INTERNAL MEDICINE

## 2020-01-08 PROCEDURE — 3074F SYST BP LT 130 MM HG: CPT | Mod: HCNC,CPTII,S$GLB, | Performed by: INTERNAL MEDICINE

## 2020-01-08 PROCEDURE — 99214 OFFICE O/P EST MOD 30 MIN: CPT | Mod: HCNC,S$GLB,, | Performed by: INTERNAL MEDICINE

## 2020-01-08 PROCEDURE — 99999 PR PBB SHADOW E&M-EST. PATIENT-LVL IV: CPT | Mod: PBBFAC,HCNC,, | Performed by: INTERNAL MEDICINE

## 2020-01-08 PROCEDURE — 99999 PR PBB SHADOW E&M-EST. PATIENT-LVL IV: ICD-10-PCS | Mod: PBBFAC,HCNC,, | Performed by: INTERNAL MEDICINE

## 2020-01-08 PROCEDURE — 1101F PR PT FALLS ASSESS DOC 0-1 FALLS W/OUT INJ PAST YR: ICD-10-PCS | Mod: HCNC,CPTII,S$GLB, | Performed by: INTERNAL MEDICINE

## 2020-01-10 NOTE — PROGRESS NOTES
CC:  Follow-up.    HPI:  The patient is a 64-year-old male that is currently being treated for AFib, gout, diabetes, fatty liver and hyperlipidemia presents today for follow-up.  He he does have a history of alcohol abuse is currently alcohol free.  He also has a history of renal cell cancer and is status post left nephrectomy.  We had last seen the patient, who is having problems with reflux.  He was on Protonix in the morning.  He was started on ranitidine 300 mg q.p.m..  He reports that he is feeling about 75% better.  He does not have any reflux symptoms during the day.  He still feels a little bit at night.  He was also having issues with restless leg syndrome.  Was started on Requip which has helped.  Patient's new complaint today is elevated triglycerides.  Patient was on fenofibrate as well as Crestor.  He stopped taking fenofibrate for 2-3 months at of concerns cross reacting with Crestor.  He did restart his Crestor today.  He is also complaining of right shoulder pain when he reaches forward.  Otherwise he has good range of motion.  This started approximately 1-2 months ago.    ROS:  Patient has no other voiced complaints.  Chest pain. No shortness of breath.  Was having issues with low back pain but this is improved.  He did have his finger injected for trigger finger.  Now states he is using diet) at cream which helps.    Physical exam:  General appearance:  No acute distress.  HEENT:  Trachea is midline without JVD.  Pulmonary:  Good inspiratory, expiratory breath sounds are heard.  Lungs are clear to auscultation.  Cardiovascular:  S1-S2, rhythm is normal.  Extremities:  Without edema.  GI:  Abdomen was nontender, nondistended without hepatosplenomegaly  Ortho:  Patient's shoulder was evaluated the patient had good range of motion.  He did have some discomfort around the AC joint with passive internal external rotation the shoulder.  Comments did discuss the patient Crestor does helpful or  triglycerides.  Will continue with just Crestor alone for now.    Assessment:  1.  Chronic right shoulder pain  2.  Hyperlipidemia with elevated triglycerides  3.  Reflux currently doing better  4.  Restless legs syndrome currently resolved  5.  AFib    Plan:  1.  Patient is continue with Crestor once a day will check a CMP and lipid panel in 1 month  2.  Refer the patient to Orthopedics for shoulder

## 2020-01-12 ENCOUNTER — PATIENT OUTREACH (OUTPATIENT)
Dept: ADMINISTRATIVE | Facility: OTHER | Age: 65
End: 2020-01-12

## 2020-01-13 ENCOUNTER — TELEPHONE (OUTPATIENT)
Dept: ENDOCRINOLOGY | Facility: CLINIC | Age: 65
End: 2020-01-13

## 2020-01-13 NOTE — TELEPHONE ENCOUNTER
----- Message from Faith Singh RN sent at 1/10/2020  4:37 PM CST -----  Contact: [employer] mail [phone]      ----- Message -----  From: Sondra Johnson  Sent: 1/10/2020   4:27 PM CST  To: Juan SOLOMON Staff    [facility] order pharmacy states they sent a fax over for patient prescription [employer] and his acc-check solutions please call back to discuss     [phone]

## 2020-01-14 ENCOUNTER — HOSPITAL ENCOUNTER (OUTPATIENT)
Dept: RADIOLOGY | Facility: HOSPITAL | Age: 65
Discharge: HOME | End: 2020-01-14
Attending: PHYSICIAN ASSISTANT
Payer: MEDICARE

## 2020-01-14 ENCOUNTER — OFFICE VISIT (OUTPATIENT)
Dept: ORTHOPEDICS | Facility: CLINIC | Age: 65
End: 2020-01-14
Payer: MEDICARE

## 2020-01-14 VITALS — WEIGHT: 184.88 LBS | HEIGHT: 67 IN | BODY MASS INDEX: 29.02 KG/M2

## 2020-01-14 DIAGNOSIS — M25.511 CHRONIC RIGHT SHOULDER PAIN: Primary | ICD-10-CM

## 2020-01-14 DIAGNOSIS — G89.29 CHRONIC RIGHT SHOULDER PAIN: ICD-10-CM

## 2020-01-14 DIAGNOSIS — G89.29 CHRONIC RIGHT SHOULDER PAIN: Primary | ICD-10-CM

## 2020-01-14 DIAGNOSIS — M25.811 SHOULDER IMPINGEMENT, RIGHT: ICD-10-CM

## 2020-01-14 DIAGNOSIS — M25.511 CHRONIC RIGHT SHOULDER PAIN: ICD-10-CM

## 2020-01-14 PROCEDURE — 73030 X-RAY EXAM OF SHOULDER: CPT | Mod: 26,HCNC,RT, | Performed by: RADIOLOGY

## 2020-01-14 PROCEDURE — 3008F BODY MASS INDEX DOCD: CPT | Mod: HCNC,CPTII,S$GLB, | Performed by: PHYSICIAN ASSISTANT

## 2020-01-14 PROCEDURE — 73030 XR SHOULDER COMPLETE 2 OR MORE VIEWS RIGHT: ICD-10-PCS | Mod: 26,HCNC,RT, | Performed by: RADIOLOGY

## 2020-01-14 PROCEDURE — 73030 X-RAY EXAM OF SHOULDER: CPT | Mod: TC,HCNC,RT

## 2020-01-14 PROCEDURE — 99999 PR PBB SHADOW E&M-EST. PATIENT-LVL IV: CPT | Mod: PBBFAC,HCNC,, | Performed by: PHYSICIAN ASSISTANT

## 2020-01-14 PROCEDURE — 99213 PR OFFICE/OUTPT VISIT, EST, LEVL III, 20-29 MIN: ICD-10-PCS | Mod: HCNC,S$GLB,, | Performed by: PHYSICIAN ASSISTANT

## 2020-01-14 PROCEDURE — 1101F PR PT FALLS ASSESS DOC 0-1 FALLS W/OUT INJ PAST YR: ICD-10-PCS | Mod: HCNC,CPTII,S$GLB, | Performed by: PHYSICIAN ASSISTANT

## 2020-01-14 PROCEDURE — 99999 PR PBB SHADOW E&M-EST. PATIENT-LVL IV: ICD-10-PCS | Mod: PBBFAC,HCNC,, | Performed by: PHYSICIAN ASSISTANT

## 2020-01-14 PROCEDURE — 1101F PT FALLS ASSESS-DOCD LE1/YR: CPT | Mod: HCNC,CPTII,S$GLB, | Performed by: PHYSICIAN ASSISTANT

## 2020-01-14 PROCEDURE — 3008F PR BODY MASS INDEX (BMI) DOCUMENTED: ICD-10-PCS | Mod: HCNC,CPTII,S$GLB, | Performed by: PHYSICIAN ASSISTANT

## 2020-01-14 PROCEDURE — 99213 OFFICE O/P EST LOW 20 MIN: CPT | Mod: HCNC,S$GLB,, | Performed by: PHYSICIAN ASSISTANT

## 2020-01-14 NOTE — LETTER
January 14, 2020      Bacilio Larry MD  [address]  [facility] [address]           [facility] - Orthopedics  [address]  [facility] [address]  Phone: [phone]          Patient: Donald Warren Abadie   MR Number: [id number]   YOB: 1955   Date of Visit: 1/14/2020       Dear Dr. Bacilio Larry:    Thank you for referring Donald Abadie to me for evaluation. Attached you will find relevant portions of my assessment and plan of care.    If you have questions, please do not hesitate to call me. I look forward to following Donald Abadie along with you.    Sincerely,    Leatha Akhtar PA-C    Enclosure  CC:  No Recipients    If you would like to receive this communication electronically, please contact [email or web address] or [phone] to request more information on [employer] Link access.    For providers and/or their staff who would like to refer a patient to [facility], please contact us through our one-stop-shop provider referral line, [facility], at [phone].    If you feel you have received this communication in error or would no longer like to receive these types of communications, please e-mail [email or web address]

## 2020-01-15 ENCOUNTER — OFFICE VISIT (OUTPATIENT)
Dept: CARDIOLOGY | Facility: CLINIC | Age: 65
End: 2020-01-15
Payer: MEDICARE

## 2020-01-15 VITALS
WEIGHT: 183.44 LBS | SYSTOLIC BLOOD PRESSURE: 119 MMHG | BODY MASS INDEX: 29.48 KG/M2 | HEART RATE: 63 BPM | DIASTOLIC BLOOD PRESSURE: 64 MMHG | HEIGHT: 66 IN

## 2020-01-15 DIAGNOSIS — E78.5 HYPERLIPIDEMIA ASSOCIATED WITH TYPE 2 DIABETES MELLITUS: ICD-10-CM

## 2020-01-15 DIAGNOSIS — E11.42 TYPE 2 DIABETES MELLITUS WITH DIABETIC POLYNEUROPATHY, WITHOUT LONG-TERM CURRENT USE OF INSULIN: ICD-10-CM

## 2020-01-15 DIAGNOSIS — I48.3 TYPICAL ATRIAL FLUTTER: ICD-10-CM

## 2020-01-15 DIAGNOSIS — I48.0 PAROXYSMAL ATRIAL FIBRILLATION: Primary | ICD-10-CM

## 2020-01-15 DIAGNOSIS — E11.69 HYPERLIPIDEMIA ASSOCIATED WITH TYPE 2 DIABETES MELLITUS: ICD-10-CM

## 2020-01-15 PROCEDURE — 3074F SYST BP LT 130 MM HG: CPT | Mod: HCNC,CPTII,S$GLB, | Performed by: INTERNAL MEDICINE

## 2020-01-15 PROCEDURE — 3078F PR MOST RECENT DIASTOLIC BLOOD PRESSURE < 80 MM HG: ICD-10-PCS | Mod: HCNC,CPTII,S$GLB, | Performed by: INTERNAL MEDICINE

## 2020-01-15 PROCEDURE — 99999 PR PBB SHADOW E&M-EST. PATIENT-LVL IV: CPT | Mod: PBBFAC,HCNC,, | Performed by: INTERNAL MEDICINE

## 2020-01-15 PROCEDURE — 3046F PR MOST RECENT HEMOGLOBIN A1C LEVEL > 9.0%: ICD-10-PCS | Mod: HCNC,CPTII,S$GLB, | Performed by: INTERNAL MEDICINE

## 2020-01-15 PROCEDURE — 99214 OFFICE O/P EST MOD 30 MIN: CPT | Mod: HCNC,S$GLB,, | Performed by: INTERNAL MEDICINE

## 2020-01-15 PROCEDURE — 99214 PR OFFICE/OUTPT VISIT, EST, LEVL IV, 30-39 MIN: ICD-10-PCS | Mod: HCNC,S$GLB,, | Performed by: INTERNAL MEDICINE

## 2020-01-15 PROCEDURE — 1101F PT FALLS ASSESS-DOCD LE1/YR: CPT | Mod: HCNC,CPTII,S$GLB, | Performed by: INTERNAL MEDICINE

## 2020-01-15 PROCEDURE — 1101F PR PT FALLS ASSESS DOC 0-1 FALLS W/OUT INJ PAST YR: ICD-10-PCS | Mod: HCNC,CPTII,S$GLB, | Performed by: INTERNAL MEDICINE

## 2020-01-15 PROCEDURE — 99999 PR PBB SHADOW E&M-EST. PATIENT-LVL IV: ICD-10-PCS | Mod: PBBFAC,HCNC,, | Performed by: INTERNAL MEDICINE

## 2020-01-15 PROCEDURE — 3078F DIAST BP <80 MM HG: CPT | Mod: HCNC,CPTII,S$GLB, | Performed by: INTERNAL MEDICINE

## 2020-01-15 PROCEDURE — 3074F PR MOST RECENT SYSTOLIC BLOOD PRESSURE < 130 MM HG: ICD-10-PCS | Mod: HCNC,CPTII,S$GLB, | Performed by: INTERNAL MEDICINE

## 2020-01-15 PROCEDURE — 3008F PR BODY MASS INDEX (BMI) DOCUMENTED: ICD-10-PCS | Mod: HCNC,CPTII,S$GLB, | Performed by: INTERNAL MEDICINE

## 2020-01-15 PROCEDURE — 3008F BODY MASS INDEX DOCD: CPT | Mod: HCNC,CPTII,S$GLB, | Performed by: INTERNAL MEDICINE

## 2020-01-15 PROCEDURE — 3046F HEMOGLOBIN A1C LEVEL >9.0%: CPT | Mod: HCNC,CPTII,S$GLB, | Performed by: INTERNAL MEDICINE

## 2020-01-15 RX ORDER — METOPROLOL TARTRATE 25 MG/1
12.5 TABLET, FILM COATED ORAL 2 TIMES DAILY
Qty: 90 TABLET | Refills: 3 | Status: SHIPPED | OUTPATIENT
Start: 2020-01-15 | End: 2020-07-14

## 2020-01-15 NOTE — LETTER
January 15, 2020      Baciilo Larry MD  [address]  [facility] [address]           [facility] - [facility]  [address]  [place] [address]  Phone: [phone]          Patient: Donald Warren Abadie   MR Number: [id number]   YOB: 1955   Date of Visit: 1/15/2020       Dear Dr. Bacilio Larry:    Thank you for referring Donald Abadie to me for evaluation. Attached you will find relevant portions of my assessment and plan of care.    If you have questions, please do not hesitate to call me. I look forward to following Donald Abadie along with you.    Sincerely,    Uriel Tolentino MD    Enclosure  CC:  No Recipients    If you would like to receive this communication electronically, please contact [email or web address] or [phone] to request more information on [email or web address] Link access.    For providers and/or their staff who would like to refer a patient to [facility], please contact us through our one-stop-shop provider referral line, [facility], at [phone].    If you feel you have received this communication in error or would no longer like to receive these types of communications, please e-mail [email or web address]

## 2020-01-15 NOTE — Clinical Note
Do you mind calling him letting him know that I checked his lipids, and his cholesterol looks good, no need to change any of his cholesterol drugs right now.Thanks,Uriel

## 2020-01-15 NOTE — PROGRESS NOTES
Subjective:      Patient ID: Donald Warren Abadie is a 64 y.o. male.    Chief Complaint: Pain of the Right Shoulder    HPI  64 year old male presents with chief complaint of right shoulder pain x 9 months. He is RHD and denies trauma. Pain occurs when he raises it up and rotates the shoulder forward. Ibuprofen prn helps. He is on eliquis. No PT or injection has been done.   Review of Systems   Constitution: Negative for chills, fever and night sweats.   Cardiovascular: Negative for chest pain.   Respiratory: Negative for cough and shortness of breath.    Hematologic/Lymphatic: Does not bruise/bleed easily.   Skin: Negative for color change.   Gastrointestinal: Negative for heartburn.   Genitourinary: Negative for dysuria.   Neurological: Negative for numbness and paresthesias.   Psychiatric/Behavioral: Negative for altered mental status.   Allergic/Immunologic: Negative for persistent infections.         Objective:            General    Vitals reviewed.  Constitutional: He is oriented to person, place, and time. He appears well-developed and well-nourished.   Cardiovascular: Normal rate.    Neurological: He is alert and oriented to person, place, and time.         Right Shoulder Exam     Range of Motion   Active abduction: normal   Forward Flexion: normal   External Rotation 0 degrees: normal   Internal rotation 0 degrees: normal     Tests & Signs   Drop arm: negative  Carrillo test: positive  Impingement: positive  Speed's Test: negative    Other   Sensation: normal    Comments:  Positive empty can test.     Muscle Strength   Right Upper Extremity   Shoulder Abduction: 5/5   Supraspinatus: 4/5/5   Biceps: 5/5/5     Vascular Exam     Right Pulses      Radial:                    2+            X-ray: ordered and reviewed by myself. Bones: No fracture.  No lytic or blastic lesion.  Joints: No evidence for glenohumeral dislocation.  Mild acromioclavicular arthrosis noted.  Soft tissues: Unremarkable      Assessment:        Encounter Diagnoses   Name Primary?    Chronic right shoulder pain Yes    Shoulder impingement, right           Plan:       Discussed treatment options with patient. He is trying to avoid surgery at this time. He will take tylenol and apply voltaren gel as needed. He wants to hold off on injection and PT for now. RTC if symptoms worsen or do not improve.

## 2020-01-15 NOTE — PROGRESS NOTES
Subjective:   Chief Complaint: Paroxysmal atrial fibrillation  Last Clinic Visit: 8/2/2019     History of Present Illness: Donald Warren Abadie is a 64 y.o. gentleman with paroxysmal atrial fibrillation, alcohol abuse, hypertension, hypertriglyceridemia, elevated LFTs, status post total knee replacement, who comes in for follow-up.  Interval History:  He has been doing reasonably well since we saw him last 5 months ago, still reports occasional paroxysmal atrial fibrillation, attributes it to when he forgets to take medications.  Denies any syncope with these episodes, no chest pain, no significant shortness of breath, just palpitations.  No dyspnea on exertion, no orthopnea, no weight gain, no PND, no lower extremity edema.  He still takes metoprolol intermittently, afraid to take more due to bradycardia in the past.  Discussed the synergistic role of metoprolol with flecainide today.  Denies any focal neurological deficits.  He is doing better from a rehab standpoint of his right knee, tells the story today of some scar tissue breaking on the front of his knee when he jumped off of his truck, and knee has been moving better since then.  Blood pressure well controlled at home.  Continuing to take metformin for diabetes.  He also reports stopping fenofibrate for elevated triglycerides per our discussion.  Accidentally stopped Crestor the same time, has only been on Crestor back for one week.    8/2/2019  He had an episodewhere he felt his blood pressure being elevated, and took several metoprolol, on top of diltiazem which we had recently started, and ultimately developed hypotension was admitted to the ER found to have bradycardia, and then had hypotensive PEA cardiac arrest, quick return of sinus rhythm,  It was felt that arrest was in the setting of multiple medications, alcohol.  Since that time he has stopped drinkin No recurrent cardiac arrest.  AFib burden has dramatically decreased now that he no longer  drinks.  Medication-wise he states he is currently taking flecainide 100 b.i.d. along with metoprolol p.r.n. with episodes of AFib.  He has an episode of AFib every few weeks.   We decreased his Crestor from 20 mg to 10 mg with an LDL of 11 and some mild transaminitis, repeat lipids in June showed that LDL went from 11 to 110.  No chest pain.    3/4/2019  He has a several year history of paroxysmal atrial fibrillation, very symptomatic, with palpitations, tachycardia, and has been seen in the ER in the past for it.  He is followed with Dr. Giang who he saw last year, and discussed antiarrhythmic therapy versus ablation, elected to pursue antiarrhythmic therapy given severe symptomatic nature of atrial fibrillation at that time.  Also with borderline age, elected to pursue anticoagulation, and currently on apixaban 5 b.i.d., he reports tolerating anticoagulation well, denies any falls, no bleeding, no hematemesis, no hematochezia.  Given alcohol abuse, discussed treating this prior to ablation.  From atrial fibrillation standpoint he reports approximately 1 episode a month, which last anywhere from 15 min to several hours.  He currently is on flecainide 100 b.i.d., and reports compliance with this medication, but also is on metoprolol succinate 25 daily, and only reports intermittent compliance with this medication due to fatigue.  In addition to succinate 25 daily he also has a prescription for 25 tartrate p.r.n., which he takes when he goes into episodes of atrial fibrillation.     He reports a persistent difficult to control hypertriglyceridemia, and currently on Crestor 20 which was recently increased as well as Lovaza and fenofibrate.  Most recent LDL 11.    He previously has discussed detox therapy with Psychiatry, however they elected for inpatient rehab given cardiac issues, and he declined.    PMHx:  Paroxysmal Atrial Fibrillation  EtOH abuse  Hypertriglyceridemia  Elevated LFTs  Osteoarthritis status post  TKR    Review of Systems   Constitution: Negative. Negative for malaise/fatigue.   HENT: Negative.    Eyes: Negative.    Cardiovascular: Negative.  Negative for irregular heartbeat and palpitations.   Respiratory: Negative.    Hematologic/Lymphatic: Negative.    Skin: Negative.    Musculoskeletal: Positive for arthritis and joint pain.   Gastrointestinal: Negative.    Genitourinary: Negative.        Medications:  Current Outpatient Medications on File Prior to Visit   Medication Sig    allopurinol (ZYLOPRIM) 100 MG tablet Take 2 tablets (200 mg total) by mouth once daily.    busPIRone (BUSPAR) 15 MG tablet Take 1 tablet (15 mg total) by mouth 2 (two) times daily.    cyanocobalamin (VITAMIN B-12) 1000 MCG tablet Take 1 tablet (1,000 mcg total) by mouth once daily.    diclofenac sodium (VOLTAREN) 1 % Gel Apply 2 g topically once daily.    flecainide (TAMBOCOR) 100 MG Tab Take 1 tablet (100 mg total) by mouth every 12 (twelve) hours.    folic acid (FOLVITE) 1 MG tablet Take 1 tablet (1 mg total) by mouth once daily.    metFORMIN (GLUCOPHAGE-XR) 500 MG 24 hr tablet Take 2 tablets (1,000 mg total) by mouth 2 (two) times daily with meals.    multivitamin (THERAGRAN) tablet Take 1 tablet by mouth once daily.    omega-3 acid ethyl esters (LOVAZA) 1 gram capsule Take 2 g by mouth 2 (two) times daily.    ondansetron (ZOFRAN) 8 MG tablet Take 1 tablet (8 mg total) by mouth every 8 (eight) hours as needed for Nausea.    oxyCODONE-acetaminophen (PERCOCET) 5-325 mg per tablet Take 1 tablet by mouth every 6 (six) hours as needed for Pain.    pantoprazole (PROTONIX) 40 MG tablet Take 1 tablet (40 mg total) by mouth once daily.    ranitidine (ZANTAC) 300 MG tablet Take 1 tablet (300 mg total) by mouth every evening.    rOPINIRole (REQUIP) 1 MG tablet Take 1 tablet (1 mg total) by mouth every evening.    rosuvastatin (CRESTOR) 20 MG tablet Take 1 tablet (20 mg total) by mouth once daily.    tamsulosin (FLOMAX) 0.4 mg  "Cap Take 2 capsules (0.8 mg total) by mouth once daily.    [DISCONTINUED] apixaban (ELIQUIS) 5 mg Tab Take 1 tablet (5 mg total) by mouth 2 (two) times daily.    [DISCONTINUED] metoprolol tartrate (LOPRESSOR) 25 MG tablet Take 0.5 tablets (12.5 mg total) by mouth 2 (two) times daily.     No current facility-administered medications on file prior to visit.      Family History:  Kleber's family history includes Colon cancer in his father; Colon polyps in his brother; Diabetes in his mother; Lung cancer in his father and sister.    Social History:  Kleber reports that he has never smoked. He has never used smokeless tobacco. He reports that he drank alcohol. He reports that he does not use drugs.    Objective:   /64 (BP Location: Left arm, Patient Position: Sitting, BP Method: Large (Automatic))   Pulse 63   Ht 5' 6" (1.676 m)   Wt 83.2 kg (183 lb 6.8 oz)   BMI 29.61 kg/m²     Physical Exam   Constitutional: He is oriented to person, place, and time and well-developed, well-nourished, and in no distress. No distress.   HENT:   Head: Normocephalic and atraumatic.   Mouth/Throat: No oropharyngeal exudate.   Eyes: EOM are normal. No scleral icterus.   Neck: No JVD present. No tracheal deviation present. No thyromegaly present.   Cardiovascular: Normal rate and regular rhythm. Exam reveals no gallop and no friction rub.   No murmur heard.  Pulmonary/Chest: Effort normal and breath sounds normal. No respiratory distress. He has no wheezes. He has no rales. He exhibits no tenderness.   Abdominal: Soft. He exhibits no distension. There is no tenderness. There is no rebound and no guarding.   Musculoskeletal: He exhibits no edema.   Incision noted over right knee from prior knee replacement, no surrounding erythema.   Neurological: He is alert and oriented to person, place, and time.   Skin: Skin is warm and dry. He is not diaphoretic. No erythema.   Facial plethora   Psychiatric: Affect normal.     EKG:  My " independent visualization of most recent EKG is normal sinus rhythm, nonspecific ST changes, borderline left atrial enlargement    TTE:  10/12/2018  CONCLUSIONS     1 - Normal left ventricular systolic function (EF 60-65%).     2 - Biatrial enlargement.     3 - No wall motion abnormalities.     4 - Quantitatively measured LV function is 67%.     5 - Indeterminate LV diastolic function.     6 - Normal right ventricular systolic function .     7 - The estimated PA systolic pressure is greater than 24 mmHg.     8 - Mild tricuspid regurgitation.     03/15/2019  · Low normal left ventricular systolic function. The estimated ejection fraction is 50%  · Concentric left ventricular remodeling.  · Septal wall has abnormal motion.  · RV was not well visualized  · Mild tricuspid regurgitation.  · Atrial fibrillation observed.     Lipids:  Recent Labs   Lab 11/01/19  0807   LDL Cholesterol 79.4   HDL 24 L   Cholesterol 121      Holter 08/01/2018  PREDOMINANT RHYTHM  1. Sinus rhythm with heart rates varying between 44 and 82 bpm with an average of 55 bpm.     VENTRICULAR ARRHYTHMIAS  1. There were very rare PVCs recorded totalling 41 and averaging less than 1 per hour.     2. There were no episodes of ventricular tachycardia.    SUPRA VENTRICULAR ARRHYTHMIAS  1. There were very rare PACs recorded totalling 15 and averaging less than 1 per hour.  There were 3 couplets.    2. There were no episodes of sustained supraventricular tachycardia.        Assessment:     1. Paroxysmal atrial fibrillation    2. Hyperlipidemia associated with type 2 diabetes mellitus    3. Typical atrial flutter    4. Type 2 diabetes mellitus with diabetic polyneuropathy, without long-term current use of insulin        Plan:   1. Paroxysmal atrial fibrillation  Symptoms appear to be relatively stable.  Discussed importance of taking low-dose beta-blocker with flecainide, offered him increase of flecainide for more rhythm suppression, and he is fine on  current dose.  Continue Eliquis for stroke prophylaxis.  - metoprolol tartrate (LOPRESSOR) 25 MG tablet; Take 0.5 tablets (12.5 mg total) by mouth 2 (two) times daily.  Dispense: 90 tablet; Refill: 3  - apixaban (ELIQUIS) 5 mg Tab; Take 1 tablet (5 mg total) by mouth 2 (two) times daily.  Dispense: 180 tablet; Refill: 3    2. Hyperlipidemia associated with type 2 diabetes mellitus  Will stop lipid panel to be drawn this Friday, instead will set reminder for February 7th, next lipid panel, and then evaluate whether current dose of Crestor sufficient.    3. Typical atrial flutter      4. Type 2 diabetes mellitus with diabetic polyneuropathy, without long-term current use of insulin  Encouraged to continue compliance with metformin.      Follow up in about 6 months (around 7/15/2020).

## 2020-01-17 ENCOUNTER — LAB VISIT (OUTPATIENT)
Dept: LAB | Facility: HOSPITAL | Age: 65
End: 2020-01-17
Attending: INTERNAL MEDICINE
Payer: MEDICARE

## 2020-01-17 DIAGNOSIS — E11.42 TYPE 2 DIABETES MELLITUS WITH DIABETIC POLYNEUROPATHY, WITHOUT LONG-TERM CURRENT USE OF INSULIN: ICD-10-CM

## 2020-01-17 DIAGNOSIS — R20.8 OTHER DISTURBANCES OF SKIN SENSATION: ICD-10-CM

## 2020-01-17 LAB
ALBUMIN SERPL BCP-MCNC: 3.9 G/DL (ref 3.5–5.2)
ALP SERPL-CCNC: 119 U/L (ref 55–135)
ALT SERPL W/O P-5'-P-CCNC: 11 U/L (ref 10–44)
ANION GAP SERPL CALC-SCNC: 10 MMOL/L (ref 8–16)
AST SERPL-CCNC: 16 U/L (ref 10–40)
BILIRUB SERPL-MCNC: 0.5 MG/DL (ref 0.1–1)
BUN SERPL-MCNC: 7 MG/DL (ref 8–23)
CALCIUM SERPL-MCNC: 9.3 MG/DL (ref 8.7–10.5)
CHLORIDE SERPL-SCNC: 101 MMOL/L (ref 95–110)
CO2 SERPL-SCNC: 29 MMOL/L (ref 23–29)
CREAT SERPL-MCNC: 0.9 MG/DL (ref 0.5–1.4)
EST. GFR  (AFRICAN AMERICAN): >60 ML/MIN/1.73 M^2
EST. GFR  (NON AFRICAN AMERICAN): >60 ML/MIN/1.73 M^2
ESTIMATED AVG GLUCOSE: 189 MG/DL (ref 68–131)
GLUCOSE SERPL-MCNC: 220 MG/DL (ref 70–110)
HBA1C MFR BLD HPLC: 8.2 % (ref 4–5.6)
POTASSIUM SERPL-SCNC: 4.2 MMOL/L (ref 3.5–5.1)
PROT SERPL-MCNC: 7.1 G/DL (ref 6–8.4)
SODIUM SERPL-SCNC: 140 MMOL/L (ref 136–145)
VIT B12 SERPL-MCNC: 364 PG/ML (ref 210–950)

## 2020-01-17 PROCEDURE — 83036 HEMOGLOBIN GLYCOSYLATED A1C: CPT | Mod: HCNC

## 2020-01-17 PROCEDURE — 82607 VITAMIN B-12: CPT | Mod: HCNC

## 2020-01-17 PROCEDURE — 80053 COMPREHEN METABOLIC PANEL: CPT | Mod: HCNC

## 2020-01-17 PROCEDURE — 36415 COLL VENOUS BLD VENIPUNCTURE: CPT | Mod: HCNC

## 2020-01-18 ENCOUNTER — PATIENT OUTREACH (OUTPATIENT)
Dept: ADMINISTRATIVE | Facility: OTHER | Age: 65
End: 2020-01-18

## 2020-01-20 ENCOUNTER — OFFICE VISIT (OUTPATIENT)
Dept: RHEUMATOLOGY | Facility: CLINIC | Age: 65
End: 2020-01-20
Payer: MEDICARE

## 2020-01-20 VITALS
DIASTOLIC BLOOD PRESSURE: 75 MMHG | HEART RATE: 58 BPM | HEIGHT: 65 IN | BODY MASS INDEX: 31.59 KG/M2 | TEMPERATURE: 98 F | WEIGHT: 189.63 LBS | SYSTOLIC BLOOD PRESSURE: 119 MMHG

## 2020-01-20 DIAGNOSIS — M1A.09X0 IDIOPATHIC CHRONIC GOUT OF MULTIPLE SITES WITHOUT TOPHUS: Primary | ICD-10-CM

## 2020-01-20 PROCEDURE — 3078F PR MOST RECENT DIASTOLIC BLOOD PRESSURE < 80 MM HG: ICD-10-PCS | Mod: HCNC,CPTII,S$GLB, | Performed by: INTERNAL MEDICINE

## 2020-01-20 PROCEDURE — 3074F SYST BP LT 130 MM HG: CPT | Mod: HCNC,CPTII,S$GLB, | Performed by: INTERNAL MEDICINE

## 2020-01-20 PROCEDURE — 1101F PR PT FALLS ASSESS DOC 0-1 FALLS W/OUT INJ PAST YR: ICD-10-PCS | Mod: HCNC,CPTII,S$GLB, | Performed by: INTERNAL MEDICINE

## 2020-01-20 PROCEDURE — 99213 OFFICE O/P EST LOW 20 MIN: CPT | Mod: HCNC,S$GLB,, | Performed by: INTERNAL MEDICINE

## 2020-01-20 PROCEDURE — 1101F PT FALLS ASSESS-DOCD LE1/YR: CPT | Mod: HCNC,CPTII,S$GLB, | Performed by: INTERNAL MEDICINE

## 2020-01-20 PROCEDURE — 99999 PR PBB SHADOW E&M-EST. PATIENT-LVL III: CPT | Mod: PBBFAC,HCNC,, | Performed by: INTERNAL MEDICINE

## 2020-01-20 PROCEDURE — 3008F PR BODY MASS INDEX (BMI) DOCUMENTED: ICD-10-PCS | Mod: HCNC,CPTII,S$GLB, | Performed by: INTERNAL MEDICINE

## 2020-01-20 PROCEDURE — 99213 PR OFFICE/OUTPT VISIT, EST, LEVL III, 20-29 MIN: ICD-10-PCS | Mod: HCNC,S$GLB,, | Performed by: INTERNAL MEDICINE

## 2020-01-20 PROCEDURE — 99999 PR PBB SHADOW E&M-EST. PATIENT-LVL III: ICD-10-PCS | Mod: PBBFAC,HCNC,, | Performed by: INTERNAL MEDICINE

## 2020-01-20 PROCEDURE — 3008F BODY MASS INDEX DOCD: CPT | Mod: HCNC,CPTII,S$GLB, | Performed by: INTERNAL MEDICINE

## 2020-01-20 PROCEDURE — 3074F PR MOST RECENT SYSTOLIC BLOOD PRESSURE < 130 MM HG: ICD-10-PCS | Mod: HCNC,CPTII,S$GLB, | Performed by: INTERNAL MEDICINE

## 2020-01-20 PROCEDURE — 3078F DIAST BP <80 MM HG: CPT | Mod: HCNC,CPTII,S$GLB, | Performed by: INTERNAL MEDICINE

## 2020-01-20 RX ORDER — ALLOPURINOL 100 MG/1
200 TABLET ORAL DAILY
Qty: 180 TABLET | Refills: 3 | Status: SHIPPED | OUTPATIENT
Start: 2020-01-20 | End: 2020-05-19 | Stop reason: SDUPTHER

## 2020-01-20 ASSESSMENT — ROUTINE ASSESSMENT OF PATIENT INDEX DATA (RAPID3)
TOTAL RAPID3 SCORE: 0
PATIENT GLOBAL ASSESSMENT SCORE: 0
FATIGUE SCORE: 0
PAIN SCORE: 0
PSYCHOLOGICAL DISTRESS SCORE: 0
MDHAQ FUNCTION SCORE: 0

## 2020-01-20 NOTE — PROGRESS NOTES
History of present illness:  64-year-old gentleman has a history of crystal proven gout which dates back to 1998.  I have been following him since 2006.  He remains on allopurinol 200 mg daily.  He has had no recent gout attacks.  He has no history of tophi.  He was last seen 2 years ago.    He also has osteoarthritis of the right knee.  He had a right total knee replacement in July.  He has been responding slowly.  He is still going to therapy.  He has been using diclofenac gel with some relief.  He was also seen for a trigger finger.  He now has developed pain in the right shoulder.  He was seen by Orthopedics in told he has an impingement syndrome.  He will start treating it topically.  He has had no other recent medical problems.    Physical examination:  Musculoskeletal:  He has pain on range of motion of the right shoulder with some limitation of range of motion.  Left shoulder is unremarkable.  He has postsurgical changes of the right knee.  He has no evidence of synovitis or tophi.    Assessment:  1.  Inter critical gout  2.  Osteoarthritis    Plans:  1.  He will have repeat uric acid level on February 7th  2.  Continue allopurinol 200 mg daily until then.  3.  Return to see me in 12 months

## 2020-01-31 ENCOUNTER — TELEPHONE (OUTPATIENT)
Dept: PODIATRY | Facility: CLINIC | Age: 65
End: 2020-01-31

## 2020-02-06 ENCOUNTER — PATIENT OUTREACH (OUTPATIENT)
Dept: ADMINISTRATIVE | Facility: OTHER | Age: 65
End: 2020-02-06

## 2020-02-06 DIAGNOSIS — E11.9 DIABETES MELLITUS TYPE 2, NONINSULIN DEPENDENT: Primary | ICD-10-CM

## 2020-02-07 ENCOUNTER — LAB VISIT (OUTPATIENT)
Dept: LAB | Facility: HOSPITAL | Age: 65
End: 2020-02-07
Attending: INTERNAL MEDICINE
Payer: MEDICARE

## 2020-02-07 DIAGNOSIS — E11.69 HYPERLIPIDEMIA ASSOCIATED WITH TYPE 2 DIABETES MELLITUS: ICD-10-CM

## 2020-02-07 DIAGNOSIS — E78.5 HYPERLIPIDEMIA ASSOCIATED WITH TYPE 2 DIABETES MELLITUS: ICD-10-CM

## 2020-02-07 DIAGNOSIS — M1A.09X0 IDIOPATHIC CHRONIC GOUT OF MULTIPLE SITES WITHOUT TOPHUS: ICD-10-CM

## 2020-02-07 LAB
ALBUMIN SERPL BCP-MCNC: 4.2 G/DL (ref 3.5–5.2)
ALP SERPL-CCNC: 105 U/L (ref 55–135)
ALT SERPL W/O P-5'-P-CCNC: 14 U/L (ref 10–44)
ANION GAP SERPL CALC-SCNC: 10 MMOL/L (ref 8–16)
AST SERPL-CCNC: 17 U/L (ref 10–40)
BILIRUB SERPL-MCNC: 0.3 MG/DL (ref 0.1–1)
BUN SERPL-MCNC: 5 MG/DL (ref 8–23)
CALCIUM SERPL-MCNC: 9.4 MG/DL (ref 8.7–10.5)
CHLORIDE SERPL-SCNC: 100 MMOL/L (ref 95–110)
CHOLEST SERPL-MCNC: 110 MG/DL (ref 120–199)
CHOLEST/HDLC SERPL: 4.1 {RATIO} (ref 2–5)
CO2 SERPL-SCNC: 28 MMOL/L (ref 23–29)
CREAT SERPL-MCNC: 1 MG/DL (ref 0.5–1.4)
EST. GFR  (AFRICAN AMERICAN): >60 ML/MIN/1.73 M^2
EST. GFR  (NON AFRICAN AMERICAN): >60 ML/MIN/1.73 M^2
GLUCOSE SERPL-MCNC: 167 MG/DL (ref 70–110)
HDLC SERPL-MCNC: 27 MG/DL (ref 40–75)
HDLC SERPL: 24.5 % (ref 20–50)
LDLC SERPL CALC-MCNC: 58 MG/DL (ref 63–159)
NONHDLC SERPL-MCNC: 83 MG/DL
POTASSIUM SERPL-SCNC: 4.4 MMOL/L (ref 3.5–5.1)
PROT SERPL-MCNC: 7.3 G/DL (ref 6–8.4)
SODIUM SERPL-SCNC: 138 MMOL/L (ref 136–145)
TRIGL SERPL-MCNC: 125 MG/DL (ref 30–150)
URATE SERPL-MCNC: 5.2 MG/DL (ref 3.4–7)

## 2020-02-07 PROCEDURE — 80061 LIPID PANEL: CPT | Mod: HCNC

## 2020-02-07 PROCEDURE — 84550 ASSAY OF BLOOD/URIC ACID: CPT | Mod: HCNC

## 2020-02-07 PROCEDURE — 36415 COLL VENOUS BLD VENIPUNCTURE: CPT | Mod: HCNC

## 2020-02-07 PROCEDURE — 80053 COMPREHEN METABOLIC PANEL: CPT | Mod: HCNC

## 2020-02-10 ENCOUNTER — OFFICE VISIT (OUTPATIENT)
Dept: PODIATRY | Facility: CLINIC | Age: 65
End: 2020-02-10
Payer: MEDICARE

## 2020-02-10 ENCOUNTER — OUTPATIENT CASE MANAGEMENT (OUTPATIENT)
Dept: ADMINISTRATIVE | Facility: OTHER | Age: 65
End: 2020-02-10

## 2020-02-10 VITALS
WEIGHT: 189 LBS | SYSTOLIC BLOOD PRESSURE: 126 MMHG | DIASTOLIC BLOOD PRESSURE: 75 MMHG | HEIGHT: 66 IN | HEART RATE: 64 BPM | BODY MASS INDEX: 30.37 KG/M2

## 2020-02-10 DIAGNOSIS — B35.1 ONYCHOMYCOSIS DUE TO DERMATOPHYTE: Primary | ICD-10-CM

## 2020-02-10 DIAGNOSIS — E11.42 DIABETIC POLYNEUROPATHY ASSOCIATED WITH TYPE 2 DIABETES MELLITUS: ICD-10-CM

## 2020-02-10 PROCEDURE — 3052F HG A1C>EQUAL 8.0%<EQUAL 9.0%: CPT | Mod: HCNC,CPTII,S$GLB, | Performed by: STUDENT IN AN ORGANIZED HEALTH CARE EDUCATION/TRAINING PROGRAM

## 2020-02-10 PROCEDURE — 99999 PR PBB SHADOW E&M-EST. PATIENT-LVL III: CPT | Mod: PBBFAC,HCNC,, | Performed by: STUDENT IN AN ORGANIZED HEALTH CARE EDUCATION/TRAINING PROGRAM

## 2020-02-10 PROCEDURE — 99999 PR PBB SHADOW E&M-EST. PATIENT-LVL III: ICD-10-PCS | Mod: PBBFAC,HCNC,, | Performed by: STUDENT IN AN ORGANIZED HEALTH CARE EDUCATION/TRAINING PROGRAM

## 2020-02-10 PROCEDURE — 11721 DEBRIDE NAIL 6 OR MORE: CPT | Mod: HCNC,S$GLB,, | Performed by: STUDENT IN AN ORGANIZED HEALTH CARE EDUCATION/TRAINING PROGRAM

## 2020-02-10 PROCEDURE — 99214 PR OFFICE/OUTPT VISIT, EST, LEVL IV, 30-39 MIN: ICD-10-PCS | Mod: HCNC,25,S$GLB, | Performed by: STUDENT IN AN ORGANIZED HEALTH CARE EDUCATION/TRAINING PROGRAM

## 2020-02-10 PROCEDURE — 3074F PR MOST RECENT SYSTOLIC BLOOD PRESSURE < 130 MM HG: ICD-10-PCS | Mod: HCNC,CPTII,S$GLB, | Performed by: STUDENT IN AN ORGANIZED HEALTH CARE EDUCATION/TRAINING PROGRAM

## 2020-02-10 PROCEDURE — 1101F PR PT FALLS ASSESS DOC 0-1 FALLS W/OUT INJ PAST YR: ICD-10-PCS | Mod: HCNC,CPTII,S$GLB, | Performed by: STUDENT IN AN ORGANIZED HEALTH CARE EDUCATION/TRAINING PROGRAM

## 2020-02-10 PROCEDURE — 3074F SYST BP LT 130 MM HG: CPT | Mod: HCNC,CPTII,S$GLB, | Performed by: STUDENT IN AN ORGANIZED HEALTH CARE EDUCATION/TRAINING PROGRAM

## 2020-02-10 PROCEDURE — 99214 OFFICE O/P EST MOD 30 MIN: CPT | Mod: HCNC,25,S$GLB, | Performed by: STUDENT IN AN ORGANIZED HEALTH CARE EDUCATION/TRAINING PROGRAM

## 2020-02-10 PROCEDURE — 3008F PR BODY MASS INDEX (BMI) DOCUMENTED: ICD-10-PCS | Mod: HCNC,CPTII,S$GLB, | Performed by: STUDENT IN AN ORGANIZED HEALTH CARE EDUCATION/TRAINING PROGRAM

## 2020-02-10 PROCEDURE — 1101F PT FALLS ASSESS-DOCD LE1/YR: CPT | Mod: HCNC,CPTII,S$GLB, | Performed by: STUDENT IN AN ORGANIZED HEALTH CARE EDUCATION/TRAINING PROGRAM

## 2020-02-10 PROCEDURE — 3008F BODY MASS INDEX DOCD: CPT | Mod: HCNC,CPTII,S$GLB, | Performed by: STUDENT IN AN ORGANIZED HEALTH CARE EDUCATION/TRAINING PROGRAM

## 2020-02-10 PROCEDURE — 11721 ROUTINE FOOT CARE: ICD-10-PCS | Mod: HCNC,S$GLB,, | Performed by: STUDENT IN AN ORGANIZED HEALTH CARE EDUCATION/TRAINING PROGRAM

## 2020-02-10 PROCEDURE — 3078F PR MOST RECENT DIASTOLIC BLOOD PRESSURE < 80 MM HG: ICD-10-PCS | Mod: HCNC,CPTII,S$GLB, | Performed by: STUDENT IN AN ORGANIZED HEALTH CARE EDUCATION/TRAINING PROGRAM

## 2020-02-10 PROCEDURE — 3052F PR MOST RECENT HEMOGLOBIN A1C LEVEL 8.0 - < 9.0%: ICD-10-PCS | Mod: HCNC,CPTII,S$GLB, | Performed by: STUDENT IN AN ORGANIZED HEALTH CARE EDUCATION/TRAINING PROGRAM

## 2020-02-10 PROCEDURE — 3078F DIAST BP <80 MM HG: CPT | Mod: HCNC,CPTII,S$GLB, | Performed by: STUDENT IN AN ORGANIZED HEALTH CARE EDUCATION/TRAINING PROGRAM

## 2020-02-10 RX ORDER — AMMONIUM LACTATE 12 G/100G
1 CREAM TOPICAL 2 TIMES DAILY
Qty: 1 BOTTLE | Refills: 0 | Status: SHIPPED | OUTPATIENT
Start: 2020-02-10 | End: 2020-03-05

## 2020-02-10 RX ORDER — CICLOPIROX 80 MG/ML
SOLUTION TOPICAL NIGHTLY
Qty: 1 BOTTLE | Refills: 0 | Status: SHIPPED | OUTPATIENT
Start: 2020-02-10 | End: 2020-03-05

## 2020-02-10 NOTE — LETTER
February 28, 2020    Donald Warren Abadie  [address] [place] [address]             [facility] [address] [facility] [address] Dear Mr. Donald Warren Abadie:    Welcome to [facility]s Complex Care Management Program.  It was a pleasure talking with you today.  My name is Nicole Metzger RN and I look forward to being your Care Manager.  My goal is to help you function at the healthiest and highest level possible.  You can contact me directly at [phone].     As an [facility] patient with [employer], some of the services we may be able to provide include:      Development of an individualized care plan with a Registered Nurse    Connection with a    Connection with available resources and services     Coordinate communication among your care team members    Provide coaching and education    Help you understand your doctors treatment plan   Help you obtain information about your insurance coverage.     All services provided by [facility]s Complex Care Managers and other care team members are coordinated with and communicated to your primary care team.    As part of your enrollment, you will be receiving education materials and more information about these services in your [facility] account, by phone or through the mail.  If you do not wish to participate or receive information, please contact our office at [phone].      Sincerely,        Nicole Metzger RN  [facility]   Out-patient RN Complex Care Manager                                                               -2-      [facility]:  Nicole Metzger RN, [facility]- [facility] Care Management Nurse   Tel:[phone] Mon-Fri, 8 am- 4:30 pm    [facility] Main Office- TEL:  [phone]     Office Hours Mon-Fri, 8 am - 4:30 pm     [facility] On Call, 24/7 Nurse Help Line (non emergent)- TEL:  [phone]    [facility] Technical Issue Support Team--TEL:  [phone], Mon-Fri 9 am- 5  "pm.    [email or web address] online patient portal      [facility] Exam [facility]-- TEL:  [phone]  Colonoscopy -- TEL:   [phone]  Ophthalmology Clinic--TEL:  [phone]                             [employer] Managed Care:  [employer] First 24 Hour Nurse Line--TEL:  [phone]    [employer] Care,  Over-the-Counter Benefit-- TEL:  [phone]    [employer] Care, Gerald Exercise- [place] [facility]    [employer] Care, "Mom's Meal", TEL: [phone]    [employer] Care - Transportation Reservation-- TEL:  [phone]  Mon-Fri, 8 am-5 pm, 3 business days notice required.             "

## 2020-02-10 NOTE — PROCEDURES
Routine Foot Care  Date/Time: 2/10/2020 2:15 PM  Performed by: Janice Harper DPM  Authorized by: Janice Harper DPM     Consent Done?:  Yes (Verbal)    Nail Care Type:  Debride  Location(s): All  (Left 1st Toe, Left 3rd Toe, Left 2nd Toe, Left 4th Toe, Left 5th Toe, Right 1st Toe, Right 2nd Toe, Right 3rd Toe, Right 4th Toe and Right 5th Toe)  Patient tolerance:  Patient tolerated the procedure well with no immediate complications

## 2020-02-10 NOTE — LETTER
February 14, 2020    Kleber Schuster Abacarolynbarbara  [address] [place] [address]             [facility] [address] [place] [address] Dear Mr. Donald Warren Abadie:     I am a nurse  with [facility]'s Outpatient Case Management program.  The program is free and is made up of nurses and social workers who help connect patients to resources and education that can help them in managing their health.     We received a referral through the [facility] system and in coordination with Dr. Bacilio Larry, Primary Care Physician to offer assistance if needed.   I have attempted to reach you by phone two times and will make a last attempt by February 20, 2020.      If you receive this letter and have questions or concerns, please don't hesitate to call. I will be glad to speak with you.     Sincerely,        Nicole Metzger RN  [facility]  TEL:  [phone]

## 2020-02-10 NOTE — LETTER
February 10, 2020      Celia Martinez MD  [address]  [place] [address]           [facility] - Podiatry  [address]  [facility] [address]  Phone: [phone]          Patient: Donald Warren Abadie   MR Number: [id number]   YOB: 1955   Date of Visit: 2/10/2020       Dear Dr. Celia Martinez:    Thank you for referring Donald Abadie to me for evaluation. Attached you will find relevant portions of my assessment and plan of care.    If you have questions, please do not hesitate to call me. I look forward to following Donald Abadie along with you.    Sincerely,    Janice Harper, SHRUTHI    Enclosure  CC:  No Recipients    If you would like to receive this communication electronically, please contact [email or web address] or [phone] to request more information on [employer] Link access.    For providers and/or their staff who would like to refer a patient to [facility], please contact us through our one-stop-shop provider referral line, [facility], at [phone].    If you feel you have received this communication in error or would no longer like to receive these types of communications, please e-mail [email or web address]

## 2020-02-10 NOTE — PROGRESS NOTES
Subjective:      Patient ID: Donald Warren Abadie is a 64 y.o. male.    Chief Complaint: Diabetes Mellitus (dr bacilio mercado) and Diabetic Foot Exam    Kleber is a 64 y.o. male who presents to the clinic upon referral from Dr. Martinez  for evaluation and treatment of diabetic feet. Kleber has a past medical history of A-fib, Alcohol abuse, Anxiety, Arthritis, Chronic gout, Diabetes mellitus, DM (diabetes mellitus) (11/16/2016), Fatty liver, Hyperlipidemia, Renal cell cancer (2014), and S/P TKR (total knee replacement), right (6/27/2019). Patient relates no major problem with feet. Only complaints today consist of elongated toe nails.    PCP: Bacilio Mercado MD    Date Last Seen by PCP: see epic    Current shoe gear: Casual shoes    Hemoglobin A1C   Date Value Ref Range Status   01/17/2020 8.2 (H) 4.0 - 5.6 % Final     Comment:     ADA Screening Guidelines:  5.7-6.4%  Consistent with prediabetes  >or=6.5%  Consistent with diabetes  High levels of fetal hemoglobin interfere with the HbA1C  assay. Heterozygous hemoglobin variants (HbS, HgC, etc)do  not significantly interfere with this assay.   However, presence of multiple variants may affect accuracy.     10/07/2019 9.4 (H) 4.0 - 5.6 % Final     Comment:     ADA Screening Guidelines:  5.7-6.4%  Consistent with prediabetes  >or=6.5%  Consistent with diabetes  High levels of fetal hemoglobin interfere with the HbA1C  assay. Heterozygous hemoglobin variants (HbS, HgC, etc)do  not significantly interfere with this assay.   However, presence of multiple variants may affect accuracy.     06/21/2019 7.9 (H) 4.0 - 5.6 % Final     Comment:     ADA Screening Guidelines:  5.7-6.4%  Consistent with prediabetes  >or=6.5%  Consistent with diabetes  High levels of fetal hemoglobin interfere with the HbA1C  assay. Heterozygous hemoglobin variants (HbS, HgC, etc)do  not significantly interfere with this assay.   However, presence of multiple variants may affect accuracy.              Review of Systems   Constitution: Negative for chills, decreased appetite, diaphoresis and fever.   HENT: Negative for congestion and hearing loss.    Cardiovascular: Negative for chest pain, claudication, leg swelling and syncope.   Respiratory: Negative for cough and shortness of breath.    Skin: Negative for color change, dry skin, flushing, itching, nail changes, poor wound healing, rash, suspicious lesions and unusual hair distribution.   Musculoskeletal: Positive for back pain, joint pain, joint swelling and myalgias.   Gastrointestinal: Negative for nausea and vomiting.   Neurological: Positive for numbness. Negative for loss of balance, paresthesias and sensory change.   Psychiatric/Behavioral: Negative for altered mental status, suicidal ideas and thoughts of violence. The patient is not nervous/anxious.            Objective:      Physical Exam   Constitutional: He is oriented to person, place, and time. He appears well-developed and well-nourished. No distress.   Cardiovascular: Intact distal pulses.   Pulses:       Dorsalis pedis pulses are 1+ on the right side, and 1+ on the left side.        Posterior tibial pulses are 1+ on the right side, and 1+ on the left side.       Skin temperature is within normal limits. Toes are cool to touch and feet are warm proximally. Hair growth is diminished. Skin is mildly atrophic and with mild hyperpigmentation. Mild edema noted, jesus.    Musculoskeletal: He exhibits edema and deformity. He exhibits no tenderness.        Right foot: There is decreased range of motion and deformity.        Left foot: There is decreased range of motion and deformity.   Adequate joint range of motion without pain, limitation, nor crepitation to bilateral feet and ankle joints. Muscle strength is 5/5 in all groups bilaterally.    No POP noted, jesus   Feet:   Right Foot:   Protective Sensation: 10 sites tested. 7 sites sensed.   Skin Integrity: Positive for dry skin. Negative for  blister, erythema or warmth.   Left Foot:   Protective Sensation: 10 sites tested. 7 sites sensed.   Skin Integrity: Positive for dry skin. Negative for blister, erythema or warmth.   Lymphadenopathy:   Negative lymphadenopathy bilateral popliteal fossa and tarsal tunnel.    Neurological: He is alert and oriented to person, place, and time. A sensory deficit is present. He exhibits normal muscle tone.       Weed-Heath 5.07 monofilamant testing is diminished. Sharp/dull sensation is diminished bilaterally. Light touch is diminished bilaterally. Vibratory sensation is diminished jesus   Skin: Skin is warm and dry. No abrasion, no bruising, no burn, no laceration, no petechiae and no rash noted. He is not diaphoretic. No cyanosis or erythema. No pallor. Nails show no clubbing.   Skin is warm and dry, bilaterally, no acute SOI noted, bilaterally, appears stable.     Skin is very dry, jesus foot      Nails are thickened by 2-4 mm's, dystrophic, and are darkened in coloration with subungual fungal debris.            Psychiatric: He has a normal mood and affect. His behavior is normal. Judgment and thought content normal.             Assessment:       Encounter Diagnoses   Name Primary?    Onychomycosis due to dermatophyte Yes    Diabetic polyneuropathy associated with type 2 diabetes mellitus          Plan:       Kleber was seen today for diabetes mellitus and diabetic foot exam.    Diagnoses and all orders for this visit:    Onychomycosis due to dermatophyte  -     Routine Foot Care    Diabetic polyneuropathy associated with type 2 diabetes mellitus  -     Routine Foot Care    Other orders  -     ciclopirox (PENLAC) 8 % Soln; Apply topically nightly.  -     ammonium lactate 12 % Crea; Apply 1 g topically 2 (two) times daily.      I counseled the patient on his conditions, their implications and medical management.    -Nails debrided x10 without incident, see procedure note.     - Shoe inspection. Diabetic Foot  Education. Patient reminded of the importance of good nutrition and blood sugar control to help prevent podiatric complications of diabetes. Patient instructed on proper foot hygeine. We discussed wearing proper shoe gear, daily foot inspections, never walking without protective shoe gear, never putting sharp instruments to feet, routine podiatric nail visits every 2-3 months.      - With patient's permission, nails were aggressively reduced and debrided x 10 to their soft tissue attachment mechanically and with electric , removing all offending nail and debris. Patient relates relief following the procedure. He will continue to monitor the areas daily, inspect his feet, wear protective shoe gear when ambulatory, moisturizer to maintain skin integrity and follow in this office in approximately 2-3 months, sooner p.r.n.    -RTC in 3 mo or sooner PRN

## 2020-02-14 ENCOUNTER — TELEPHONE (OUTPATIENT)
Dept: CARDIOLOGY | Facility: CLINIC | Age: 65
End: 2020-02-14

## 2020-02-14 NOTE — TELEPHONE ENCOUNTER
Results of labs given to patient and he verbalized understanding.       MD Nicole Wayne MA             Do you mind calling him letting him know that I checked his lipids, and his cholesterol looks good, no need to change any of his cholesterol drugs right now.     Thanks,   Uriel

## 2020-02-14 NOTE — TELEPHONE ENCOUNTER
----- Message from Uriel Tolentino MD sent at 2/10/2020  9:22 AM CST -----  Do you mind calling him letting him know that I checked his lipids, and his cholesterol looks good, no need to change any of his cholesterol drugs right now.    Thanks,  Uriel

## 2020-02-20 ENCOUNTER — TELEPHONE (OUTPATIENT)
Dept: ENDOCRINOLOGY | Facility: CLINIC | Age: 65
End: 2020-02-20

## 2020-02-20 ENCOUNTER — PATIENT OUTREACH (OUTPATIENT)
Dept: ADMINISTRATIVE | Facility: OTHER | Age: 65
End: 2020-02-20

## 2020-02-20 NOTE — TELEPHONE ENCOUNTER
----- Message from Evelina Chinchilla sent at 2/20/2020 10:22 AM CST -----  Contact: Marko Wilde  Ms. Wilde is calling to speak with Staff regarding the pt's blood sugar; its 270 & he's dizzy.  Also, the current medication causes diaherria & she wants to know if it can be changed because he thinks he may have an accident if he's away from the house & doesn't take it as he should?    She can be reached at [phone].    Thank you.

## 2020-02-20 NOTE — TELEPHONE ENCOUNTER
Elevated blood sugars would not cause dizziness.  Please contact your PCP.  Needs visit to discuss elevated sugars. Scheduled for tomorrow at 1:30pM margo Morales.  Instructed to bring in sugar logs.  Up to date on A1C

## 2020-02-20 NOTE — PROGRESS NOTES
Subjective:      Patient ID: Donald Warren Abadie is a 64 y.o. male.    Chief Complaint:  Diabetes    History of Present Illness: 64 y.o. man with Type 2 DM, DLP, paroxysmal atrial fibrillation, kidney cancer, primary osteoarthritis of right knee, gout, GERD, and alcohol abuse. He presents for follow up of Type 2 DM. Last visit in 10/17/19 with Dr. Martinez. This is his first visit with me.     Seen in ed 10/6/2019 for Diabetes     With regards to the diabetes:    Diagnosed with Type 2 DM 4/2008   Was on Remeron but it was stopped  He feels this increased his appetite and caused the hyperglycemia     Since stopping all bg are better   Family History of Type 2 DM: mother and maternal and paternal grandmother  Known complications  DKA/HHS: -  RN: [facility]; due for eye exam  PN: +; saw podiatry 2/10/2020  Nephropathy: -; due in Oct 2020  Gastroparesis: none   CAD: none    Current regimen:  Metformin 1000 mg BID- started 10/6/19 -reports he is nauseated and diarrhea if not having with a meal    Missed doses- missing doses once every 2 weeks    Other medications tried:  CANTU started 10/7/19 glucotrol 5 qam - we advised not to take    2-3 # times a day testing. See below            Log reviewed: yes  Hypoglycemic event? Denies  Yes, did not need assistance   Knows how to correct with 15 grams of carbs- juice, coke, or a peppermint.     No Polyuria polydipsia nocturia or vision changes    Dietary recall: He feels that he is not following diabetic diet.  Eats 3 meals a day. He reports he is trying to avoid rice  B: cereal or biscuit from [employer]  L: red beans and rice; BLT sandwiches  D: beef stew and vegetables  He is eating out a lot. He is single and does not cook often.   Snacks: ice cream and cookies  Drinks: water and iced tea     Exercise - tried to stay active. No formal exercise.     Education - last visit: none; does not want to go but agrees    Diabetes Management Status  Statin: Taking  ACE/ARB: Not  taking    Denies personal history of pancreatitis or family history of thyroid cancer.     Lab Results   Component Value Date    HGBA1C 8.2 (H) 01/17/2020    HGBA1C 9.4 (H) 10/07/2019    HGBA1C 7.9 (H) 06/21/2019     Screening or Prevention Patient's value Goal Complete/Controlled?   HgA1C Testing and Control   Lab Results   Component Value Date    HGBA1C 8.2 (H) 01/17/2020      Annually/Less than 8% No   Lipid profile : 02/07/2020 Annually Yes   LDL control Lab Results   Component Value Date    LDLCALC 58.0 (L) 02/07/2020    Annually/Less than 100 mg/dl  Yes   Nephropathy screening Lab Results   Component Value Date    LABMICR <2.5 10/17/2019     Lab Results   Component Value Date    PROTEINUA Negative 03/21/2019    Annually Yes   Blood pressure BP Readings from Last 1 Encounters:   02/21/20 126/74    Less than 140/90 Yes   Dilated retinal exam : 02/07/2019 Annually Yes   Foot exam   : 02/21/2020 Annually Yes     With regards to dyslipidemia,     Tolerating statin      Ref. Range 2/7/2020 09:25   Cholesterol Latest Ref Range: 120 - 199 mg/dL 110 (L)   HDL Latest Ref Range: 40 - 75 mg/dL 27 (L)   Hdl/Cholesterol Ratio Latest Ref Range: 20.0 - 50.0 % 24.5   LDL Cholesterol External Latest Ref Range: 63.0 - 159.0 mg/dL 58.0 (L)   Non-HDL Cholesterol Latest Units: mg/dL 83   Total Cholesterol/HDL Ratio Latest Ref Range: 2.0 - 5.0  4.1   Triglycerides Latest Ref Range: 30 - 150 mg/dL 125     Review of Systems   Constitutional: Negative for unexpected weight change.   Eyes: Negative for visual disturbance.   Respiratory: Negative for shortness of breath.    Cardiovascular: Negative for chest pain.   Gastrointestinal: Negative for abdominal pain.   Endocrine: Negative for polydipsia, polyphagia and polyuria.   Musculoskeletal: Negative for myalgias.   Skin: Negative for wound.   Neurological: Negative for headaches.   Hematological: Does not bruise/bleed easily.   Psychiatric/Behavioral: Negative for sleep disturbance.  "      Objective:     /74 (BP Location: Right arm, Patient Position: Sitting)   Pulse 63   Ht 5' 6" (1.676 m)   Wt 83.5 kg (184 lb 1.4 oz)   BMI 29.71 kg/m²   BP Readings from Last 3 Encounters:   02/21/20 126/74   02/10/20 126/75   01/20/20 119/75     Wt Readings from Last 1 Encounters:   02/21/20 1310 83.5 kg (184 lb 1.4 oz)     Body mass index is 29.71 kg/m².    Physical Exam   Constitutional: He appears well-developed.   Neck: No thyromegaly present.   Cardiovascular: Normal rate.   Pulmonary/Chest: Effort normal.   Abdominal: Soft.   Vitals reviewed.  Diabetes Foot Exam:   Denies sores or lesions to bilateral feet  Shoes appropriate  Vibratory and monofilament sensation intact    Lab Review:   Lab Results   Component Value Date    HGBA1C 8.2 (H) 01/17/2020     Lab Results   Component Value Date    CHOL 110 (L) 02/07/2020    HDL 27 (L) 02/07/2020    LDLCALC 58.0 (L) 02/07/2020    TRIG 125 02/07/2020    CHOLHDL 24.5 02/07/2020     Lab Results   Component Value Date     02/07/2020    K 4.4 02/07/2020     02/07/2020    CO2 28 02/07/2020     (H) 02/07/2020    BUN 5 (L) 02/07/2020    CREATININE 1.0 02/07/2020    CALCIUM 9.4 02/07/2020    PROT 7.3 02/07/2020    ALBUMIN 4.2 02/07/2020    BILITOT 0.3 02/07/2020    ALKPHOS 105 02/07/2020    AST 17 02/07/2020    ALT 14 02/07/2020    ANIONGAP 10 02/07/2020    ESTGFRAFRICA >60.0 02/07/2020    EGFRNONAA >60.0 02/07/2020    TSH 2.092 10/07/2019     No results found for: [id number]    Assessment and Plan     1. Type 2 diabetes mellitus with diabetic polyneuropathy, without long-term current use of insulin  SITagliptin (JANUVIA) 100 MG Tab    Ambulatory referral/consult to Diabetes Education    Comprehensive metabolic panel    Hemoglobin A1c   2. Hyperlipidemia associated with type 2 diabetes mellitus         Type 2 diabetes mellitus with diabetic polyneuropathy, without long-term current use of insulin  -- Reviewed goals of therapy are to get the " best control we can without hypoglycemia  -- Refer to diabetes education  Medication changes:   Continue: Metformin ER 1000 mg BID  Start: Januvia 100 mg daily  -- Discussed injectables but he is not interested at this time. He agrees to work hard on diet and exercise to avoid additional medications at next visit. Encouraged to continue checking BG 2-3 times daily.   -- Hypoglycemia precautions discussed. Instructed on precautions before driving.    -- Call for Bg repeatedly < 90 or > 180.   -- Close adherence to lifestyle changes recommended.   -- Periodic follow ups for eye evaluations, foot care and dental care suggested.  -- Encouraged exercise per ADA guidelines of 150 minutes weekly. Discussed plate method. Given information on low carb snacks and DM drinks.     Hyperlipidemia associated with type 2 diabetes mellitus  -- Controlled   -- On statin per ADA recommendations      Follow up in about 3 months (around 5/21/2020).  Labs prior to next appointment with me

## 2020-02-20 NOTE — ASSESSMENT & PLAN NOTE
-- Reviewed goals of therapy are to get the best control we can without hypoglycemia  -- Refer to diabetes education  Medication changes:   Continue: Metformin ER 1000 mg BID  Start: Januvia 100 mg daily  -- Discussed injectables but he is not interested at this time. He agrees to work hard on diet and exercise to avoid additional medications at next visit. Encouraged to continue checking BG 2-3 times daily.   -- Hypoglycemia precautions discussed. Instructed on precautions before driving.    -- Call for Bg repeatedly < 90 or > 180.   -- Close adherence to lifestyle changes recommended.   -- Periodic follow ups for eye evaluations, foot care and dental care suggested.  -- Encouraged exercise per ADA guidelines of 150 minutes weekly. Discussed plate method. Given information on low carb snacks and DM drinks.

## 2020-02-21 ENCOUNTER — TELEPHONE (OUTPATIENT)
Dept: INTERNAL MEDICINE | Facility: CLINIC | Age: 65
End: 2020-02-21

## 2020-02-21 ENCOUNTER — OFFICE VISIT (OUTPATIENT)
Dept: ENDOCRINOLOGY | Facility: CLINIC | Age: 65
End: 2020-02-21
Payer: MEDICARE

## 2020-02-21 VITALS
HEIGHT: 66 IN | WEIGHT: 184.06 LBS | BODY MASS INDEX: 29.58 KG/M2 | DIASTOLIC BLOOD PRESSURE: 74 MMHG | SYSTOLIC BLOOD PRESSURE: 126 MMHG | HEART RATE: 63 BPM

## 2020-02-21 DIAGNOSIS — E78.5 HYPERLIPIDEMIA ASSOCIATED WITH TYPE 2 DIABETES MELLITUS: ICD-10-CM

## 2020-02-21 DIAGNOSIS — E11.69 HYPERLIPIDEMIA ASSOCIATED WITH TYPE 2 DIABETES MELLITUS: ICD-10-CM

## 2020-02-21 DIAGNOSIS — E11.42 TYPE 2 DIABETES MELLITUS WITH DIABETIC POLYNEUROPATHY, WITHOUT LONG-TERM CURRENT USE OF INSULIN: ICD-10-CM

## 2020-02-21 PROCEDURE — 3078F DIAST BP <80 MM HG: CPT | Mod: HCNC,CPTII,S$GLB, | Performed by: NURSE PRACTITIONER

## 2020-02-21 PROCEDURE — 3074F PR MOST RECENT SYSTOLIC BLOOD PRESSURE < 130 MM HG: ICD-10-PCS | Mod: HCNC,CPTII,S$GLB, | Performed by: NURSE PRACTITIONER

## 2020-02-21 PROCEDURE — 3074F SYST BP LT 130 MM HG: CPT | Mod: HCNC,CPTII,S$GLB, | Performed by: NURSE PRACTITIONER

## 2020-02-21 PROCEDURE — 99214 OFFICE O/P EST MOD 30 MIN: CPT | Mod: HCNC,S$GLB,, | Performed by: NURSE PRACTITIONER

## 2020-02-21 PROCEDURE — 1101F PT FALLS ASSESS-DOCD LE1/YR: CPT | Mod: HCNC,CPTII,S$GLB, | Performed by: NURSE PRACTITIONER

## 2020-02-21 PROCEDURE — 3008F BODY MASS INDEX DOCD: CPT | Mod: HCNC,CPTII,S$GLB, | Performed by: NURSE PRACTITIONER

## 2020-02-21 PROCEDURE — 3052F PR MOST RECENT HEMOGLOBIN A1C LEVEL 8.0 - < 9.0%: ICD-10-PCS | Mod: HCNC,CPTII,S$GLB, | Performed by: NURSE PRACTITIONER

## 2020-02-21 PROCEDURE — 1101F PR PT FALLS ASSESS DOC 0-1 FALLS W/OUT INJ PAST YR: ICD-10-PCS | Mod: HCNC,CPTII,S$GLB, | Performed by: NURSE PRACTITIONER

## 2020-02-21 PROCEDURE — 99999 PR PBB SHADOW E&M-EST. PATIENT-LVL IV: CPT | Mod: PBBFAC,HCNC,, | Performed by: NURSE PRACTITIONER

## 2020-02-21 PROCEDURE — 3078F PR MOST RECENT DIASTOLIC BLOOD PRESSURE < 80 MM HG: ICD-10-PCS | Mod: HCNC,CPTII,S$GLB, | Performed by: NURSE PRACTITIONER

## 2020-02-21 PROCEDURE — 99214 PR OFFICE/OUTPT VISIT, EST, LEVL IV, 30-39 MIN: ICD-10-PCS | Mod: HCNC,S$GLB,, | Performed by: NURSE PRACTITIONER

## 2020-02-21 PROCEDURE — 99499 UNLISTED E&M SERVICE: CPT | Mod: S$GLB,,, | Performed by: NURSE PRACTITIONER

## 2020-02-21 PROCEDURE — 99999 PR PBB SHADOW E&M-EST. PATIENT-LVL IV: ICD-10-PCS | Mod: PBBFAC,HCNC,, | Performed by: NURSE PRACTITIONER

## 2020-02-21 PROCEDURE — 3008F PR BODY MASS INDEX (BMI) DOCUMENTED: ICD-10-PCS | Mod: HCNC,CPTII,S$GLB, | Performed by: NURSE PRACTITIONER

## 2020-02-21 PROCEDURE — 3052F HG A1C>EQUAL 8.0%<EQUAL 9.0%: CPT | Mod: HCNC,CPTII,S$GLB, | Performed by: NURSE PRACTITIONER

## 2020-02-21 PROCEDURE — 99499 RISK ADDL DX/OHS AUDIT: ICD-10-PCS | Mod: S$GLB,,, | Performed by: NURSE PRACTITIONER

## 2020-02-21 NOTE — PATIENT INSTRUCTIONS
Start Januvia 100 mg daily    Snacks can be an important part of a balanced, healthy meal plan. They allow you to eat more frequently, feeling full and satisfied throughout the day. Also, they allow you to spread carbohydrates evenly, which may stabilize blood sugars.  Plus, snacks are enjoyable!     The amount of carbohydrate needed at snacks varies. Generally, about 15-30 grams of carbohydrate per snack is recommended.  Below you will find some tasty treats.       0-5 gm carb   Crystal Light   Vitamin Water Zero   Herbal tea, unsweetened   2 tsp peanut butter on celery   1./2 cup sugar-free jell-o   1 sugar-free popsicle   ¼ cup blueberries   8oz Blue [place] unsweetened almond milk   5 baby carrots & celery sticks, cucumbers, bell peppers dipped in ¼ cup salsa, 2Tbsp light ranch dressing or 2Tbsp plain Greek yogurt   10 Goldfish crackers   ½ oz low-fat cheese or string cheese   1 closed handful of nuts, unsalted   1 Tbsp of sunflower seeds, unsalted   1 cup Smart Pop popcorn   1 whole grain brown rice cake        15 gm carb   1 small piece of fruit or ½ banana or 1/2 cup lite canned fruit   3 kaila cracker squares   3 cups Smart Pop popcorn, top spray butter, Adorno lite salt or cinnamon and Truvia   5 Vanilla Wafers   ½ cup low fat, no added sugar ice cream or frozen yogurt (Blue bell, Blue Bunny, Weight Watchers, Skinny Cow)   ½ turkey, ham, or chicken sandwich   ½ c fruit with ½ c Cottage cheese   4-6 unsalted wheat crackers with 1 oz low fat cheese or 1 tbsp peanut butter    30-45 goldfish crackers (depending on flavor)    7-8 Latter-day mini brown rice cakes (caramel, apple cinnamon, chocolate)    12 Latter-day mini brown rice cakes (cheddar, bbq, ranch)    1/3 cup hummus dip with raw veg   1/2 whole wheat david, 1Tbsp hummus   Mini Pizza (1/2 whole wheat English muffin, low-fat  cheese, tomato sauce)   100 calorie snack pack (Oreo, Chips Ahoy, Ritz Mix, Baked Cheetos)   4-6 oz. light  or Greek Style yogurt (Chobani, Yoplait, Okios, [place])   ½ cup sugar-free pudding     6 in. wheat tortilla or david oven toasted chips (topped with spray butter flavoring, cinnamon, Truvia OR spray butter, garlic powder, chili powder)    18 BBQ Popchips (available at Target, Whole Foods, Fresh Market)     Diabetic drinks we recommend:   Water -BEST  Crystal light sugar free packets  Gatorade zero   Powerade zero  Tea without sweetener    Diabetic drinks we do not recommend:  Coke or any sugary regular soft drink  Coffee with sugar  Milk  Juice  Beer  Liquor    Sweeteners we recommend:  Stevia   Truvia   Oziel    Note: Caffeine can increase your blood sugar     Special K protein cereal

## 2020-02-21 NOTE — TELEPHONE ENCOUNTER
----- Message from Baron Manzanares sent at 2/21/2020 11:33 AM CST -----  Contact: Daughter/ Chani [phone]  Would like to get medical advice.  Symptoms (please be specific):  Dizziness  How long has patient had these symptoms:  2 days  Pharmacy [facility] and phone #:  [id number] - [place] - [address] AT [facility] [address] [phone] (Phone) [phone] (Fax)    Patient wants to know if he should have any labs done to figure out why he's having this symptom

## 2020-02-24 NOTE — TELEPHONE ENCOUNTER
ROSS for pt and Chani to return a call to us about pt sx. Left the [facility] On Call Number for this family. Sent message to Nurses to f/u on Wed 02/26/20

## 2020-02-27 ENCOUNTER — OFFICE VISIT (OUTPATIENT)
Dept: INTERNAL MEDICINE | Facility: CLINIC | Age: 65
End: 2020-02-27
Payer: MEDICARE

## 2020-02-27 ENCOUNTER — LAB VISIT (OUTPATIENT)
Dept: LAB | Facility: HOSPITAL | Age: 65
End: 2020-02-27
Attending: INTERNAL MEDICINE
Payer: MEDICARE

## 2020-02-27 DIAGNOSIS — R10.11 RIGHT UPPER QUADRANT ABDOMINAL PAIN: Primary | ICD-10-CM

## 2020-02-27 DIAGNOSIS — R68.89 OTHER GENERAL SYMPTOMS AND SIGNS: ICD-10-CM

## 2020-02-27 DIAGNOSIS — R10.11 RIGHT UPPER QUADRANT ABDOMINAL PAIN: ICD-10-CM

## 2020-02-27 DIAGNOSIS — R42 VERTIGO: ICD-10-CM

## 2020-02-27 LAB
ALBUMIN SERPL BCP-MCNC: 4 G/DL (ref 3.5–5.2)
ALP SERPL-CCNC: 91 U/L (ref 55–135)
ALT SERPL W/O P-5'-P-CCNC: 17 U/L (ref 10–44)
ANION GAP SERPL CALC-SCNC: 8 MMOL/L (ref 8–16)
AST SERPL-CCNC: 20 U/L (ref 10–40)
BASOPHILS # BLD AUTO: 0.06 K/UL (ref 0–0.2)
BASOPHILS NFR BLD: 0.8 % (ref 0–1.9)
BILIRUB SERPL-MCNC: 0.5 MG/DL (ref 0.1–1)
BUN SERPL-MCNC: 9 MG/DL (ref 8–23)
CALCIUM SERPL-MCNC: 9.3 MG/DL (ref 8.7–10.5)
CHLORIDE SERPL-SCNC: 103 MMOL/L (ref 95–110)
CO2 SERPL-SCNC: 27 MMOL/L (ref 23–29)
CREAT SERPL-MCNC: 1.1 MG/DL (ref 0.5–1.4)
DIFFERENTIAL METHOD: ABNORMAL
EOSINOPHIL # BLD AUTO: 0.2 K/UL (ref 0–0.5)
EOSINOPHIL NFR BLD: 2.3 % (ref 0–8)
ERYTHROCYTE [DISTWIDTH] IN BLOOD BY AUTOMATED COUNT: 14.4 % (ref 11.5–14.5)
EST. GFR  (AFRICAN AMERICAN): >60 ML/MIN/1.73 M^2
EST. GFR  (NON AFRICAN AMERICAN): >60 ML/MIN/1.73 M^2
GLUCOSE SERPL-MCNC: 258 MG/DL (ref 70–110)
HCT VFR BLD AUTO: 44.9 % (ref 40–54)
HGB BLD-MCNC: 14.1 G/DL (ref 14–18)
IMM GRANULOCYTES # BLD AUTO: 0.02 K/UL (ref 0–0.04)
IMM GRANULOCYTES NFR BLD AUTO: 0.3 % (ref 0–0.5)
LYMPHOCYTES # BLD AUTO: 1.9 K/UL (ref 1–4.8)
LYMPHOCYTES NFR BLD: 25.5 % (ref 18–48)
MCH RBC QN AUTO: 27.8 PG (ref 27–31)
MCHC RBC AUTO-ENTMCNC: 31.4 G/DL (ref 32–36)
MCV RBC AUTO: 88 FL (ref 82–98)
MONOCYTES # BLD AUTO: 0.7 K/UL (ref 0.3–1)
MONOCYTES NFR BLD: 8.8 % (ref 4–15)
NEUTROPHILS # BLD AUTO: 4.6 K/UL (ref 1.8–7.7)
NEUTROPHILS NFR BLD: 62.3 % (ref 38–73)
NRBC BLD-RTO: 0 /100 WBC
PLATELET # BLD AUTO: 188 K/UL (ref 150–350)
PMV BLD AUTO: 9.3 FL (ref 9.2–12.9)
POTASSIUM SERPL-SCNC: 4.1 MMOL/L (ref 3.5–5.1)
PROT SERPL-MCNC: 7.1 G/DL (ref 6–8.4)
RBC # BLD AUTO: 5.08 M/UL (ref 4.6–6.2)
SODIUM SERPL-SCNC: 138 MMOL/L (ref 136–145)
TSH SERPL DL<=0.005 MIU/L-ACNC: 1.3 UIU/ML (ref 0.4–4)
WBC # BLD AUTO: 7.42 K/UL (ref 3.9–12.7)

## 2020-02-27 PROCEDURE — 3008F BODY MASS INDEX DOCD: CPT | Mod: HCNC,CPTII,S$GLB, | Performed by: INTERNAL MEDICINE

## 2020-02-27 PROCEDURE — 3074F PR MOST RECENT SYSTOLIC BLOOD PRESSURE < 130 MM HG: ICD-10-PCS | Mod: HCNC,CPTII,S$GLB, | Performed by: INTERNAL MEDICINE

## 2020-02-27 PROCEDURE — 3078F DIAST BP <80 MM HG: CPT | Mod: HCNC,CPTII,S$GLB, | Performed by: INTERNAL MEDICINE

## 2020-02-27 PROCEDURE — 99999 PR PBB SHADOW E&M-EST. PATIENT-LVL V: ICD-10-PCS | Mod: PBBFAC,HCNC,, | Performed by: INTERNAL MEDICINE

## 2020-02-27 PROCEDURE — 3078F PR MOST RECENT DIASTOLIC BLOOD PRESSURE < 80 MM HG: ICD-10-PCS | Mod: HCNC,CPTII,S$GLB, | Performed by: INTERNAL MEDICINE

## 2020-02-27 PROCEDURE — 99215 OFFICE O/P EST HI 40 MIN: CPT | Mod: HCNC,S$GLB,, | Performed by: INTERNAL MEDICINE

## 2020-02-27 PROCEDURE — 3074F SYST BP LT 130 MM HG: CPT | Mod: HCNC,CPTII,S$GLB, | Performed by: INTERNAL MEDICINE

## 2020-02-27 PROCEDURE — 3008F PR BODY MASS INDEX (BMI) DOCUMENTED: ICD-10-PCS | Mod: HCNC,CPTII,S$GLB, | Performed by: INTERNAL MEDICINE

## 2020-02-27 PROCEDURE — 84443 ASSAY THYROID STIM HORMONE: CPT | Mod: HCNC

## 2020-02-27 PROCEDURE — 1101F PR PT FALLS ASSESS DOC 0-1 FALLS W/OUT INJ PAST YR: ICD-10-PCS | Mod: HCNC,CPTII,S$GLB, | Performed by: INTERNAL MEDICINE

## 2020-02-27 PROCEDURE — 99215 PR OFFICE/OUTPT VISIT, EST, LEVL V, 40-54 MIN: ICD-10-PCS | Mod: HCNC,S$GLB,, | Performed by: INTERNAL MEDICINE

## 2020-02-27 PROCEDURE — 99999 PR PBB SHADOW E&M-EST. PATIENT-LVL V: CPT | Mod: PBBFAC,HCNC,, | Performed by: INTERNAL MEDICINE

## 2020-02-27 PROCEDURE — 80053 COMPREHEN METABOLIC PANEL: CPT | Mod: HCNC

## 2020-02-27 PROCEDURE — 85025 COMPLETE CBC W/AUTO DIFF WBC: CPT | Mod: HCNC

## 2020-02-27 PROCEDURE — 1101F PT FALLS ASSESS-DOCD LE1/YR: CPT | Mod: HCNC,CPTII,S$GLB, | Performed by: INTERNAL MEDICINE

## 2020-02-27 PROCEDURE — 36415 COLL VENOUS BLD VENIPUNCTURE: CPT | Mod: HCNC

## 2020-02-27 RX ORDER — MECLIZINE HYDROCHLORIDE 25 MG/1
25 TABLET ORAL 2 TIMES DAILY PRN
Qty: 30 TABLET | Refills: 1 | Status: SHIPPED | OUTPATIENT
Start: 2020-02-27 | End: 2023-10-28

## 2020-02-28 ENCOUNTER — TELEPHONE (OUTPATIENT)
Dept: OTOLARYNGOLOGY | Facility: CLINIC | Age: 65
End: 2020-02-28

## 2020-02-28 ENCOUNTER — TELEPHONE (OUTPATIENT)
Dept: INTERNAL MEDICINE | Facility: CLINIC | Age: 65
End: 2020-02-28

## 2020-02-28 ENCOUNTER — HOSPITAL ENCOUNTER (OUTPATIENT)
Dept: RADIOLOGY | Facility: HOSPITAL | Age: 65
Discharge: HOME | End: 2020-02-28
Attending: INTERNAL MEDICINE
Payer: MEDICARE

## 2020-02-28 DIAGNOSIS — R10.11 RIGHT UPPER QUADRANT ABDOMINAL PAIN: ICD-10-CM

## 2020-02-28 PROCEDURE — 76705 ECHO EXAM OF ABDOMEN: CPT | Mod: 26,HCNC,, | Performed by: RADIOLOGY

## 2020-02-28 PROCEDURE — 76705 US ABDOMEN LIMITED: ICD-10-PCS | Mod: 26,HCNC,, | Performed by: RADIOLOGY

## 2020-02-28 PROCEDURE — 76705 ECHO EXAM OF ABDOMEN: CPT | Mod: TC,HCNC

## 2020-02-28 NOTE — PROGRESS NOTES
Outpatient Care Management  Initial Patient Assessment    Patient: Donald Warren Abadie  MRN: [id number]  Date of Service: 02/10/2020  Completed by: Nicole Metzger RN  Referral Date:   Program:     Reason for Visit   Patient presents with    OPCM Chart Review     2/10/2020    OPCM RN First Assessment Attempt     2/13/2020    OPCM RN Second Assessment Attempt     2/14/2020  Letter Mailed    Initial Assessment     2/28/2020    PHQ-9    Plan Of Care    OPCM Enrollment Call       Brief Summary:   Patient referred to OPCM for high risk score 68%. Pt confirms receiving Unable to Reach Letter.  Spoke with patient telephonically. Explained OPCM program. Patient in agreement to have OPCM assist & manage health issues.  H/o Alcohol abuse- pt without ETOH x 1 year, Afib, HTN, Fatty Liver, HLD, Ca kidney, DM2, GERD, Gout, OA/Right Knee replacement 6/2019.   Completed initial assessment/Med Rec/PHQ2. Two med discrepancies:  Flomax is listed in Epic, pt denies taking this Rx. Metoprolol 25 mg (0.5 tab) BID is listed in Epic, pt reports taking rx based on his HR, having h/o of taking too much metoprolol resulting in near death HR=30s. Messages sent to Dr. Larry, PCP regarding Flomax and to Dr. Tolentino, Cardio regarding metoprolol regimen reconciliation. Pt reports recording the time he takes all meds to avoid overdosing. Pt is using an old small pill box. A weekly pill box was mailed to pt. Pt reports checking his BP 3 x day and BGs 3-4 x day. Last A1C=8.2 (189) on 1/17/2020. Pt is s/p POD appt 2/10, ENDO 2/21 and PCP 2/27. Pt states he now needs an Eye Appt & Colonoscopy scheduled.  Ophthalmology & Colonoscopy clinic contact number mailed in Welcome Letter.   Pt's chief concern today is for his dizziness, reason for PCP appt. New rx Meclizine sent to [facility] with pending  by pt. Pt unhappy with earliest available ENT appt later in March or early April . This OPCM RN was able to facilitate appt to 3/16. Reviewed all  upcoming appts -including CDE appt 3/17. Mailed out copy of appts. Pt is active on Patient Portal. S/p US abd today due to reported intermittent abd pains, results pending. Pt believes it may be his gallbladder. Pt new to Januvia rx 2/21 and states if he need further DM information after seeing CDE, he will ask for it.   Pt is AAOx4, single, lives alone in his own home, is independent with all ADLS/IADLs- pt drives, amb without AD, denies falls. Pt's daughter, Chani is primary contact for pt. Patient expresses interest in receiving information on Advanced Care Planning. Patient reports being unfamiliar with the OTC [employer] Benefit. Both Advanced Care Planning packet and copy of [employer] OTC catalog with stated $20/month allowance info were mailed to pt.   Instructed pt in safety measures to avoid dizziness. Instructed pt in regular HR  and to report <60 HR.   Pt agrees to f/u RN call in 2 wks.     Provided pt with contact for OPCM RN.  Mailed Welcome Letter with contacts for OPCM RN, OOC, ENT/Ophthalm/Colonoscopy scheduling and [employer] Benefit contacts, education material on dizziness,  HG OTC 2020 catalog with $20/month allowance info, Advance Care Planning Packet, BP/HR logs, March appts and Pill Box.      Assessment Documentation     OPCM Initial Assessment    Involvement of Care  Do I have permission to speak with other family members about your care?:  Yes (Comment: Rosalba Rincon)  Assessment completed by:  Patient  Patient Reported Insurance  Verified current insurance plan:  [employer] Medicare Advantage  [employer] benefits discussed:  OTC prescription discounts, Transportation, Well [employer]ine, Mail Order Pharmacy, Silver Sneakers (Comment: Joined [facility] but hasn't been going-- 3 mos.)  Current Health Status  Patient Health Rating  Compared to other people your age, how would you rate your health?:  Good  Patient Reported Labs & Vitals  Any patient reported labs and/or vitals?:  Yes  Patient reported blood  pressure (mmHg):  110/61 (Comment: Pt checks home BP 3 x day. Pt would like a log sheet for BPs)  Patient reported pulse (bpm):  70s  Patient reported blood glucose (mg/dL):  <100 (Comment: Pt checks BG 3-4 x day, started to log BGs)  Social Determinants  Advanced Care Planning  Do you have any of the following?:  Caregiver make informed decisions on your behalf (Comment: Rosalba Gleason-verbally )  If yes, do we have a copy?:  No  If no, would the patient like Advance Directive resources?:  Yes  Advanced Care Planning resources provided?:  Yes  Is Advanced Care Planning an area of need?:  Yes  Support  Caregiver presence?:  No  Present activity level:  not limited in any of these ways  Who takes you to your medical appointments?:  patient (Comment: Pt drives)  Housing  Living arrangements:  alone  Number of people in home:  1  Type of residence:  single family home (Comment: Lives in a double that he owns. Pt rents out other side)  Own or rent?:  own  Permanent residence?:  yes  Does the patient's residence have any of the following?:  n/a (Comment: Just needs grab bars)  Is housing an area of need?:  no  Access to Mass Media & Technology  Does the patient have access to any of the following devices or technologies?:  Internet access, SmartPhone  Clinical Assessment  Medication Adherence  How does the patient obtain their medications?:  mail order  How many days a week do you miss medications?:  never  Do you use a pill box or medication chart to help you manage your medications?:  pill box, medication chart  Do you sometimes have difficulty refilling your medications?:  no  Medication reconciliation completed?:  Yes  Is medication adherence an area of need?:  No  *Active medication list was reviewed and reconciled with patient and/or caregiver:    Nutritional Status  Diet:  Diabetic diet, low cholesterol  Change in appetite?:  no  Recent changes in weight?:  no  Dentition:  N/A  Is nutrition an area of need?:   no  Labs  Do you have regular lab work to monitor your medications?:  no  Type of lab work:  A1c  Where do you get your lab work done?:  [facility]  Is lab adherence an area of need?:  no  PHQ Depression Screen  Does patient's PHQ Depression Screening indicate a barrier to meeting self-care needs?:  No  Cognitive/Behavioral Health  Alert and oriented?:  yes  Difficulty thinking?:  no  Requires prompting?:  no  Requires assistance for routine expression?:  no  Is Cognitive/Behavioral health an area of need?:  no  Culture/Zoroastrianism  Does patient have cultural or Anabaptism beliefs that may impact ability to access healthcare?:  no  Communication  Language preference:  English   needed?:  no  Hearing problems?:  yes  Hearing assistance:  hearing aid  Decreased vision?:  no (Comment: Next thing is eye exam)  Legally blind?:  no  Vision assistance:  glasses (Comment: Reading glasses)  Is Communication an area of need?:  no  Health Literacy  Preferred learning method:  face to face  How often do you need to have someone help you read instructions, pamphlets, or other written material from your doctor or pharmacy?:  occasionally  Is there a Health Literacy need?:  no  Activities of Daily Living  Ambulation:  independent  Dressing:  independent  Bathing:  independent  Transfers:  independent  Toileting:  independent  Feeding:  independent  Cleaning home/chores:  independent  Telephone use:  independent  Shopping/attending doctors' appointments:  independent  Paying bills:  independent  Taking meds:  independent  Climbing stairs:  independent  Fall Risk  Patient mobility status:  Ambulatory  Number of falls in the past 12 months:  0  Fall risk?:  No  Equipment/Current Services  Equipment/supplies used in home:  hearing or visual assistive devices, glucometer  Current services:  n/a  Is Equipment/Supplies/Services an area of need?:  no  Community & Government Programs  Support:  none  Government suppot assistance:   N/A  [facility] Office of Aging and Adult Services:  N/A  Community Resource Assessment  Based on the assessment of needs:  No community resources needed at this time (Comment: Pt would like Advanced Care Planning information used at [facility]. )  Completion of Initial Assessment  Is the Initial Assessment Complete at this time?:  yes         Problem List and History     Patient Active Problem List   Diagnosis    Anal fistula    Hyperlipidemia associated with type 2 diabetes mellitus    Idiopathic chronic gout of multiple sites without tophus    Alcohol withdrawal    Hypokalemia    Overweight (BMI 25.0-29.9)    Fatty liver    Cancer of kidney    Renal cell cancer    Hemorrhoids    Personal history of kidney cancer    Paroxysmal atrial fibrillation    Primary osteoarthritis of right knee    Alcohol use disorder, severe, in controlled environment    Atrial flutter    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 The following were identified as areas of need: Vertigo/Safety    Medical History:  Reviewed medical history with patient and/or caregiver    Social History:  Reviewed social history with patient and/or caregiver    Patient Instructions     Instructions were provided via the [facility] patient resources and are available for the patient to view on the patient portal, if active.    Next steps:   F/u with response to med clarifications from Dr. Larry, PCP- Flomax and Dr. Tolentino, Cardio -Metoprolol exact regimen.  F/u on receipt of mailings.   F/u on use of pill box, adherence to meds.   F/u on BP, HR readings   F/u on grab bar installations.   F/u on pt safety, status  of dizziness    Follow up in about 30 days (around 3/11/2020) for OPCM RN Follow Up to update care plan.    Todays OPCM Self-Management Care Plan was developed with the patients/caregivers input and was based on identified barriers from todays assessment.  Goals were written today with the patient/caregiver and the patient has agreed to work towards these goals to improve his/her overall well-being. Patient verbalized understanding of the care plan, goals, and all of today's instructions. Encouraged patient/caregiver to communicate with his/her physician and health care team about health conditions and the treatment plan.  Provided my contact information today and encouraged patient/caregiver to call me with any questions as needed.

## 2020-02-28 NOTE — TELEPHONE ENCOUNTER
----- Message from Meme Ware sent at 2/27/2020  4:21 PM CST -----  Contact: self  Good Afternoon,      Pt has a NP appt scheduled for 03/27 with Dr. Francois but stated that he needs to be seen sooner by this provider due to the issues that he's experiencing and how frequent. Pt's appt also has been added to the wait list incase of any cancellations and he is aware if there is any he will receive an automated alert. Pt would rather a return call back as soon as possble by staff for a sooner appt at the number listed above.    Thank You,    [employer]

## 2020-02-28 NOTE — TELEPHONE ENCOUNTER
----- Message from Nicole Metzger RN sent at 2/28/2020  2:08 PM CST -----  Contact: MARLEEN Alvarado Dr./Staff:    I work in [facility] and completed an initial assessment for services with pt over the phone.   Per Med Rec with pt, Mr. Abadie reports he does not take Flomax which is found listed in Epic.   Please advise to whether pt is correct or not and update Epic med list as appropriate.     Thank you,  SANDRA Alvarado, RN, CCM [facility]  [email or web address]  TEL:  [phone]    
Please advise,  Thank You.    
Pt stated that he no longer is taking, PCP ok'd and medication has been taken off medication list.  
Calmer, and slowly improving mentation and orientation. Residual confusion remains. No aggression or agitation. No AVH or paranoia.   Education regarding delirium, and cognitive disorder given to family- as above.   Patient is at this point psychiatrically cleared for discharge to STR

## 2020-02-28 NOTE — TELEPHONE ENCOUNTER
Spoke with patient he had a appointment for 03/27/2020 at 10:00AM but no Audio scheduled. Patient stated he could not wait that long. He is getting dizzy and he scared to drive. I gave him main [place] number. Patient thanked me for calling him back,and Patient voice understanding.

## 2020-02-29 ENCOUNTER — TELEPHONE (OUTPATIENT)
Dept: INTERNAL MEDICINE | Facility: CLINIC | Age: 65
End: 2020-02-29

## 2020-02-29 DIAGNOSIS — K76.0 FATTY LIVER: Primary | ICD-10-CM

## 2020-02-29 NOTE — TELEPHONE ENCOUNTER
Please contact patient and inform him that his ultrasound reveals his liver to be mildly enlarged.  Appears to be largely due to fat deposition.  I would like for him to see hepatology.  Order in.  Please schedule    Please also let him know that his blood sugar was elevated.  Please advise him to continue a diabetic diet, continue his diabetes medication, and follow-up with endocrinology as scheduled

## 2020-03-02 ENCOUNTER — DOCUMENTATION ONLY (OUTPATIENT)
Dept: TRANSPLANT | Facility: CLINIC | Age: 65
End: 2020-03-02

## 2020-03-02 VITALS
WEIGHT: 189.13 LBS | OXYGEN SATURATION: 98 % | HEIGHT: 66 IN | TEMPERATURE: 99 F | BODY MASS INDEX: 30.4 KG/M2 | DIASTOLIC BLOOD PRESSURE: 66 MMHG | HEART RATE: 76 BPM | SYSTOLIC BLOOD PRESSURE: 128 MMHG

## 2020-03-02 NOTE — PROGRESS NOTES
Subjective:       Patient ID: Donald Warren Abadie is a 64 y.o. male.    Chief Complaint: Abdominal Pain    HPI  He complains of intermittent right upper quadrant abdominal pain.  Some nausea but no vomiting.  No fever, chills, night sweats.  No blood in stool.  He also complains of dizziness, described as a spinning sensation  Review of Systems   Constitutional: Negative for chills, fatigue, fever and unexpected weight change.   Respiratory: Negative for chest tightness and shortness of breath.    Cardiovascular: Negative for chest pain and palpitations.   Gastrointestinal: Negative for abdominal pain and blood in stool.   Neurological: Negative for dizziness, syncope, numbness and headaches.       Objective:      Physical Exam   HENT:   Right Ear: External ear normal.   Left Ear: External ear normal.   Nose: Nose normal.   Mouth/Throat: Oropharynx is clear and moist.   Eyes: Pupils are equal, round, and reactive to light.   Neck: Normal range of motion.   Cardiovascular: Normal rate and regular rhythm.   No murmur heard.  Pulmonary/Chest: Breath sounds normal.   Abdominal: He exhibits no distension. There is no hepatomegaly. There is no tenderness.   Lymphadenopathy:     He has no cervical adenopathy.     He has no axillary adenopathy.   Neurological: He has normal strength and normal reflexes. No cranial nerve deficit or sensory deficit.       Assessment/Plan       Assessment and plan:  1.  Abdominal pain:  Check CMP, CBC, TSH.  Schedule gallbladder ultrasound  2.  Vertigo:  Antivert p.r.n..  Call if no relief  3.  He was here for urgent care only appointment.  He will follow up with his primary care physician for a physical

## 2020-03-02 NOTE — LETTER
March 2, 2020    Donald Abadie  [address] [place] [address]      Dear Donald Abadie:    Your doctor has referred you to the [facility]. We are sending this letter to remind you to make an appointment with us to complete the referral process.     Please call us at [phone] to schedule an appointment. We look forward to seeing you soon.     If you received a call and have been scheduled, please disregard this letter.       Sincerely,        [facility]   [facility] [place] [address]  [phone]

## 2020-03-02 NOTE — NURSING
Pt records reviewed.   Pt will be referred to Hepatology.  Fatty liver   Initial referral received  from the workque.   Referring Provider/diagnosis  Vira Maguire MD      Referral letter sent to patient.

## 2020-03-03 ENCOUNTER — TELEPHONE (OUTPATIENT)
Dept: PULMONOLOGY | Facility: CLINIC | Age: 65
End: 2020-03-03

## 2020-03-03 ENCOUNTER — OUTPATIENT CASE MANAGEMENT (OUTPATIENT)
Dept: ADMINISTRATIVE | Facility: OTHER | Age: 65
End: 2020-03-03

## 2020-03-03 NOTE — LETTER
March 11, 2020    Kleber Schuster [place]  [address] [place] [address]             [facility] [address] [place] [address] Dear Mr. Donald W. Abadie:    I work with [facility] and recently completed an assessment with you over the phone.     I have attempted to reach you, calling you back sooner than expected in order to let you know what your Cardiologist, Dr.Merrill Tolentino said about how to take Metoprolol. Dr. Tolentino instructs you to take Metoprolol  25 mg, half tablet (12.5 mg) twice a day regularly and not as needed based on your heart rate.  However , if our heart rate goes into the 50s, Dr. Tolentino wants you to notify him. (Cardio Clinic- [phone]). Please let me know if you any questions.     The Outpatient Case Management Department can be reached at [phone] from 8:00 am to 4:30 pm on Monday thru Friday. [facility] also has a program where a nurse is available 24/7 to answer questions or provide medical advice. Their phone number is [phone].     Sincerely,        Nicole Metzger RN   Outpatient Care Management  TEL:  [phone]

## 2020-03-03 NOTE — PROGRESS NOTES
Outpatient Care Management  Plan of Care Follow Up Visit    Patient: Donald Warren Abadie  MRN: [id number]  Date of Service: 03/03/2020  Completed by: Nicole Metzger RN  Referral Date:   Program:     Reason for Visit   Patient presents with    OPCM RN First Follow-Up Attempt     3/3/2020      OPCM RN Second Follow-Up Attempt     3/11/2020  Mailed letter    Update Plan Of Care     3/16/2020       Brief Summary:   Incoming call to pt who reports receiving letter- Unable to reach after 2 attempts. Pt reports calling to get the phone number for scheduling his colonoscopy. Pt states he understanding that any immediate scheduling will be impacted by COVID-19 virus restrictions. Pt confirms receipt of original Welcome letter but did not see the listed phone numbers. Provided the numbers for scheduling colonoscopy and Ophthalm appts. Instructed pt to schedule his appts to his convenience. Pt agrees.  Pt denies dizziness presently.   Pt is s/p ENT 3/13 (Removal Bilateral Cerumen Impactions)-- and HEPT-3/5  life-style modifications for Fatty Liver Disease. Pt requests copy of the life style modifications- no longer has AVS and asks to get results from recent tests.   Pt does not spend much time on phone today.     Message sent to Dr. J Luis Easley, Hepatology with pt's request for test results.   Sent instructions per Dr. Easley note 3/5 to pt via Patient Portal and mailed to pt.     Patient Summary     Involvement of Care:  Do I have permission to speak with other family members about your care?       Patient Reported Labs & Vitals:  1.  Any Patient Reported Labs & Vitals?     2.  Patient Reported Blood Pressure:     3.  Patient Reported Pulse:     4.  Patient Reported Weight (Kg):     5.  Patient Reported Blood Glucose (mg/dl):       Medical History:  Reviewed medical history with patient and/or caregiver    Social History:  Reviewed social history with patient and/or caregiver    Clinical Assessment     Reviewed and provided  basic information on available community resources for mental health, transportation, wellness resources, and palliative care programs with patient and/or caregiver.    Complex Care Plan     Care plan was discussed and completed today with input from patient and/or caregiver.    Goals    None         Patient Instructions     Instructions were provided via the [employer] patient resources and are available for the patient to view on the patient portal.    Next Steps:   F/u on receipt of mailing on life style changes for Fatty Liver.   F/u on dizziness, BP readings.     Follow up in about 4 weeks (around 3/31/2020) for OPCM RN Follow Up to update care plan.      Todays OPCM Self-Management Care Plan was developed with the patients/caregivers input and was based on identified barriers from todays assessment.  Goals were written today with the patient/caregiver and the patient has agreed to work towards these goals to improve his/her overall well-being. Patient verbalized understanding of the care plan, goals, and all of today's instructions. Encouraged patient/caregiver to communicate with his/her physician and health care team about health conditions and the treatment plan.  Provided my contact information today and encouraged patient/caregiver to call me with any questions as needed.

## 2020-03-04 ENCOUNTER — PATIENT OUTREACH (OUTPATIENT)
Dept: ADMINISTRATIVE | Facility: OTHER | Age: 65
End: 2020-03-04

## 2020-03-04 ENCOUNTER — TELEPHONE (OUTPATIENT)
Dept: HEPATOLOGY | Facility: CLINIC | Age: 65
End: 2020-03-04

## 2020-03-04 NOTE — TELEPHONE ENCOUNTER
Attempted to contact patient and confirm scheduled appointment for tomorrow but patient did not answer. Left patient a voicemail stating the purpose for the call. Awaiting a call back.

## 2020-03-05 ENCOUNTER — OFFICE VISIT (OUTPATIENT)
Dept: HEPATOLOGY | Facility: CLINIC | Age: 65
End: 2020-03-05
Payer: MEDICARE

## 2020-03-05 VITALS
RESPIRATION RATE: 18 BRPM | DIASTOLIC BLOOD PRESSURE: 63 MMHG | WEIGHT: 186.94 LBS | OXYGEN SATURATION: 97 % | HEIGHT: 66 IN | SYSTOLIC BLOOD PRESSURE: 120 MMHG | BODY MASS INDEX: 30.04 KG/M2 | HEART RATE: 67 BPM

## 2020-03-05 DIAGNOSIS — K76.0 FATTY LIVER: ICD-10-CM

## 2020-03-05 PROCEDURE — 99999 PR PBB SHADOW E&M-EST. PATIENT-LVL IV: ICD-10-PCS | Mod: PBBFAC,HCNC,, | Performed by: INTERNAL MEDICINE

## 2020-03-05 PROCEDURE — 1101F PR PT FALLS ASSESS DOC 0-1 FALLS W/OUT INJ PAST YR: ICD-10-PCS | Mod: HCNC,CPTII,S$GLB, | Performed by: INTERNAL MEDICINE

## 2020-03-05 PROCEDURE — 99213 PR OFFICE/OUTPT VISIT, EST, LEVL III, 20-29 MIN: ICD-10-PCS | Mod: HCNC,S$GLB,, | Performed by: INTERNAL MEDICINE

## 2020-03-05 PROCEDURE — 3078F DIAST BP <80 MM HG: CPT | Mod: HCNC,CPTII,S$GLB, | Performed by: INTERNAL MEDICINE

## 2020-03-05 PROCEDURE — 3008F PR BODY MASS INDEX (BMI) DOCUMENTED: ICD-10-PCS | Mod: HCNC,CPTII,S$GLB, | Performed by: INTERNAL MEDICINE

## 2020-03-05 PROCEDURE — 1101F PT FALLS ASSESS-DOCD LE1/YR: CPT | Mod: HCNC,CPTII,S$GLB, | Performed by: INTERNAL MEDICINE

## 2020-03-05 PROCEDURE — 3074F SYST BP LT 130 MM HG: CPT | Mod: HCNC,CPTII,S$GLB, | Performed by: INTERNAL MEDICINE

## 2020-03-05 PROCEDURE — 3008F BODY MASS INDEX DOCD: CPT | Mod: HCNC,CPTII,S$GLB, | Performed by: INTERNAL MEDICINE

## 2020-03-05 PROCEDURE — 99999 PR PBB SHADOW E&M-EST. PATIENT-LVL IV: CPT | Mod: PBBFAC,HCNC,, | Performed by: INTERNAL MEDICINE

## 2020-03-05 PROCEDURE — 3074F PR MOST RECENT SYSTOLIC BLOOD PRESSURE < 130 MM HG: ICD-10-PCS | Mod: HCNC,CPTII,S$GLB, | Performed by: INTERNAL MEDICINE

## 2020-03-05 PROCEDURE — 3078F PR MOST RECENT DIASTOLIC BLOOD PRESSURE < 80 MM HG: ICD-10-PCS | Mod: HCNC,CPTII,S$GLB, | Performed by: INTERNAL MEDICINE

## 2020-03-05 PROCEDURE — 99213 OFFICE O/P EST LOW 20 MIN: CPT | Mod: HCNC,S$GLB,, | Performed by: INTERNAL MEDICINE

## 2020-03-05 NOTE — PROGRESS NOTES
"Hepatology Consult Note    Referring provider: Dr. Vira Maguire    Chief complaint:   Chief Complaint   Patient presents with    Fatty Liver       HPI:  Donald Warren Abadie is a pleasant 64 y.o. malewho was referred to Hepatology Clinic for consultation of had concerns including Fatty Liver..      LEUNG/JUSTYNA. Used to drink alcohol - significant amount, stopped completely since he had a cardiac arrest in early 2019. Late 2019 - VCTE: F3.   The patient's risk factors for NAFLD include:    · Obesity/overweight                     yesBody mass index is 30.17 kg/m².  · Dyslipidemia                                yes  · Insulin resistance/Diabetes         Yes, last A1C: 8  · Family history of diabetes           no    Patient Active Problem List   Diagnosis    Anal fistula    Hyperlipidemia associated with type 2 diabetes mellitus    Idiopathic chronic gout of multiple sites without tophus    Alcohol withdrawal    Hypokalemia    Overweight (BMI 25.0-29.9)    Fatty liver    Cancer of kidney    Renal cell cancer    Hemorrhoids    Personal history of kidney cancer    Paroxysmal atrial fibrillation    Primary osteoarthritis of right knee    Alcohol use disorder, severe, in controlled environment    Atrial flutter    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    Substance induced mood disorder    GERD (gastroesophageal reflux disease)    Alcohol use disorder, severe, in sustained remission    Chronic pain of right knee    Type 2 diabetes mellitus with diabetic polyneuropathy, without long-term current use of insulin       Past Medical History:   Diagnosis Date    A-fib     Alcohol abuse     Anxiety     Arthritis     Chronic gout     Diabetes mellitus     DM (diabetes mellitus) 11/16/2016    "boarderline, not taking any meds currently"    Fatty liver     Hyperlipidemia     Renal cell cancer 2014    S/P TKR (total knee replacement), right 6/27/2019       Past Surgical History: "   Procedure Laterality Date    ABSCESS DRAINAGE      perirectal    COLONOSCOPY      HAND SURGERY Left     KIDNEY SURGERY      partial left kidney removal - [place]    PARTIAL NEPHRECTOMY Left August 2014    PERCUTANEOUS CRYOTHERAPY OF PERIPHERAL NERVE USING LIQUID NITROUS OXIDE IN CLOSED NEEDLE DEVICE Right 6/17/2019    Procedure: CRYOTHERAPY, NERVE, PERIPHERAL, PERCUTANEOUS, USING LIQUID NITROUS OXIDE IN CLOSED NEEDLE DEVICE-right knee iovera;  Surgeon: Donny Hair III, MD;  Location: [facility] CATH LAB;  Service: Pain Management;  Laterality: Right;    TOTAL KNEE ARTHROPLASTY Right 6/27/2019    Procedure: ARTHROPLASTY, KNEE, TOTAL-SAME DAY;  Surgeon: Mikael Huerta MD;  Location: [facility] OR [address] [place] [address];  Service: Orthopedics;  Laterality: Right;       Family History   Problem Relation Age of Onset    Lung cancer Father     Colon cancer Father     Diabetes Mother     Lung cancer Sister     Colon polyps Brother     Cirrhosis Neg Hx        Social History     Socioeconomic History    Marital status: Single     Spouse name: Not on file    Number of children: Not on file    Years of education: Not on file    Highest education level: Not on file   Occupational History     Employer: [employer]   Social Needs    Financial resource strain: Not on file    Food insecurity:     Worry: Not on file     Inability: Not on file    Transportation needs:     Medical: Not on file     Non-medical: Not on file   Tobacco Use    Smoking status: Never Smoker    Smokeless tobacco: Never Used   Substance and Sexual Activity    Alcohol use: Not Currently     Comment: reports quiting alcohol 3mths ago    Drug use: No    Sexual activity: Not on file   Lifestyle    Physical activity:     Days per week: Not on file     Minutes per session: Not on file    Stress: Not on file   Relationships    Social connections:     Talks on phone: Not on file     Gets together: Not on file     Attends Buddhism service: Not  on file     Active member of club or organization: Not on file     Attends meetings of clubs or organizations: Not on file     Relationship status: Not on file   Other Topics Concern    Patient feels they ought to cut down on drinking/drug use Not Asked    Patient annoyed by others criticizing their drinking/drug use Not Asked    Patient has felt bad or guilty about drinking/drug use Not Asked    Patient has had a drink/used drugs as an eye opener in the AM Not Asked   Social History Narrative    Not on file       Current Outpatient Medications   Medication Sig Dispense Refill    allopurinol (ZYLOPRIM) 100 MG tablet Take 2 tablets (200 mg total) by mouth once daily. 180 tablet 3    apixaban (ELIQUIS) 5 mg Tab Take 1 tablet (5 mg total) by mouth 2 (two) times daily. 180 tablet 3    cyanocobalamin (VITAMIN B-12) 1000 MCG tablet Take 1 tablet (1,000 mcg total) by mouth once daily.      diclofenac sodium (VOLTAREN) 1 % Gel Apply 2 g topically once daily. 1 Tube 3    flecainide (TAMBOCOR) 100 MG Tab Take 1 tablet (100 mg total) by mouth every 12 (twelve) hours. 180 tablet 3    folic acid (FOLVITE) 1 MG tablet Take 1 tablet (1 mg total) by mouth once daily. 30 tablet 11    meclizine (ANTIVERT) 25 mg tablet Take 1 tablet (25 mg total) by mouth 2 (two) times daily as needed. 30 tablet 1    metFORMIN (GLUCOPHAGE-XR) 500 MG 24 hr tablet Take 2 tablets (1,000 mg total) by mouth 2 (two) times daily with meals. 360 tablet 3    metoprolol tartrate (LOPRESSOR) 25 MG tablet Take 0.5 tablets (12.5 mg total) by mouth 2 (two) times daily. 90 tablet 3    multivitamin (THERAGRAN) tablet Take 1 tablet by mouth once daily.      omega-3 acid ethyl esters (LOVAZA) 1 gram capsule Take 2 g by mouth 2 (two) times daily.      ondansetron (ZOFRAN) 8 MG tablet Take 1 tablet (8 mg total) by mouth every 8 (eight) hours as needed for Nausea. 30 tablet 0    pantoprazole (PROTONIX) 40 MG tablet Take 1 tablet (40 mg total) by mouth  "once daily. 30 tablet 11    ranitidine (ZANTAC) 300 MG tablet Take 1 tablet (300 mg total) by mouth every evening. 30 tablet 11    rOPINIRole (REQUIP) 1 MG tablet Take 1 tablet (1 mg total) by mouth every evening. 30 tablet 11    rosuvastatin (CRESTOR) 20 MG tablet Take 1 tablet (20 mg total) by mouth once daily. 90 tablet 3    SITagliptin (JANUVIA) 100 MG Tab Take 1 tablet (100 mg total) by mouth once daily. 90 tablet 3    busPIRone (BUSPAR) 15 MG tablet Take 1 tablet (15 mg total) by mouth 2 (two) times daily. 60 tablet 2     No current facility-administered medications for this visit.        Review of patient's allergies indicates:   Allergen Reactions    No known drug allergies        Review of Systems   Constitutional: Negative for chills, fever, malaise/fatigue and weight loss.   HENT: Negative for congestion, nosebleeds and sore throat.    Eyes: Negative for blurred vision, double vision and photophobia.   Respiratory: Negative for cough and shortness of breath.    Cardiovascular: Negative for chest pain, palpitations, orthopnea and leg swelling.   Gastrointestinal: Negative for abdominal pain, blood in stool, constipation, diarrhea, melena and vomiting.   Genitourinary: Negative for dysuria.   Musculoskeletal: Negative for falls and joint pain.   Skin: Negative for itching and rash.   Neurological: Negative for dizziness, tremors and weakness.   Endo/Heme/Allergies: Does not bruise/bleed easily.   Psychiatric/Behavioral: Negative for depression and substance abuse. The patient is not nervous/anxious and does not have insomnia.        Vitals:    03/05/20 0948   BP: 120/63   Pulse: 67   Resp: 18   SpO2: 97%   Weight: 84.8 kg (186 lb 15.2 oz)   Height: 5' 6" (1.676 m)       Physical Exam   Constitutional: He is oriented to person, place, and time. He appears well-developed and well-nourished. No distress.   HENT:   Head: Normocephalic and atraumatic.   Eyes: No scleral icterus.   Pulmonary/Chest: Effort " normal. No respiratory distress.   Abdominal: Normal appearance. He exhibits no shifting dullness, no distension, no pulsatile liver, no fluid wave and no ascites. There is no splenomegaly or hepatomegaly.   Musculoskeletal: He exhibits no edema.   Neurological: He is alert and oriented to person, place, and time. He is not disoriented.   Skin: Skin is warm and dry. No rash noted. He is not diaphoretic.   Psychiatric: He has a normal mood and affect. His behavior is normal. His mood appears not anxious. His affect is not inappropriate. He is not agitated. He is communicative. He is attentive.   Nursing note and vitals reviewed.      LABS: I personally reviewed pertinent laboratory findings.    Lab Results   Component Value Date    ALT 17 02/27/2020    AST 20 02/27/2020     (H) 01/17/2019    ALKPHOS 91 02/27/2020    BILITOT 0.5 02/27/2020       Lab Results   Component Value Date    WBC 7.42 02/27/2020    HGB 14.1 02/27/2020    HCT 44.9 02/27/2020    MCV 88 02/27/2020     02/27/2020       Lab Results   Component Value Date     02/27/2020    K 4.1 02/27/2020     02/27/2020    CO2 27 02/27/2020    BUN 9 02/27/2020    CREATININE 1.1 02/27/2020    CALCIUM 9.3 02/27/2020    ANIONGAP 8 02/27/2020    ESTGFRAFRICA >60.0 02/27/2020    EGFRNONAA >60.0 02/27/2020       Lab Results   Component Value Date    HEPAIGM Negative 07/28/2018    HEPBIGM Negative 07/28/2018    HEPBCAB Negative 11/16/2016    HEPCAB Negative 07/28/2018       No results found for: SMOOTHMUSCAB, MITOAB    I personally reviewed all recent lab results.  I personally reviewed imaging studies.    Assessment:  64 y.o. male presenting with     1. Fatty liver          Recommendation(s):  - VCTE Fibroscan  - life-style modifications: weight loss, daily exercise (30 mins of HIIT or cardio), low carb/low fat diet  - control of diabetes or insulin resistance   - control of high cholesterol, if needed with statin drugs or other  cholesterol-lowering agents  - vitamin E supplements (no more than 800 mg per day) may help reduce liver fat  - coffee consumption (low caloric): 2-3 cups per day may reduce liver fat    A total of 15 minutes were spent face-to-face with the patient during this encounter and over half of that time was spent on counseling and coordination of care.  We discussed in depth the nature of the patient's liver disease, the management plan in details. I also educated the patient about lifestyle modifications which may improve hepatic steatosis, overweight/obesity, insulin resistance and high blood pressure issues. I have provided the patient with an opportunity to ask questions and have all questions answered to his satisfaction.       I have sent communication to the referring physician and/or primary care provider.    J Luis Easley MD  Staff Physician  Hepatology and Liver Transplant  [facility]  [facility]

## 2020-03-05 NOTE — LETTER
March 5, 2020      Vira Maguire MD  [address]  [place] [address]           [facility] - Hepatology  [address]  [place] [address]  Phone: [phone]  Fax: [phone]          Patient: Donald Warren Abadie   MR Number: [id number]   YOB: 1955   Date of Visit: 3/5/2020       Dear Dr. Vira Maguire:    Thank you for referring Donald Abadie to me for evaluation. Attached you will find relevant portions of my assessment and plan of care.    If you have questions, please do not hesitate to call me. I look forward to following Donald Abadie along with you.    Sincerely,    J Luis Easley MD    Enclosure  CC:  No Recipients    If you would like to receive this communication electronically, please contact [email or web address] or [phone] to request more information on [facility] Link access.    For providers and/or their staff who would like to refer a patient to [facility], please contact us through our one-stop-shop provider referral line, [facility], at [phone].    If you feel you have received this communication in error or would no longer like to receive these types of communications, please e-mail [email or web address]

## 2020-03-10 ENCOUNTER — PROCEDURE VISIT (OUTPATIENT)
Dept: HEPATOLOGY | Facility: CLINIC | Age: 65
End: 2020-03-10
Payer: MEDICARE

## 2020-03-10 DIAGNOSIS — K76.0 FATTY LIVER: ICD-10-CM

## 2020-03-10 PROCEDURE — 91200 LIVER ELASTOGRAPHY: CPT | Mod: HCNC,S$GLB,, | Performed by: INTERNAL MEDICINE

## 2020-03-10 PROCEDURE — 91200 FIBROSCAN (VIBRATION CONTROLLED TRANSIENT ELASTOGRAPHY): ICD-10-PCS | Mod: HCNC,S$GLB,, | Performed by: INTERNAL MEDICINE

## 2020-03-10 NOTE — PROCEDURES
FibroScan (Vibration Controlled Transient Elastography)  Date/Time: 3/10/2020 9:15 AM  Performed by: J Luis Easley MD  Authorized by: J Luis Easley MD     Diagnosis:  NAFLD    Probe:  M    Universal Protocol: Patient's identity, procedure and site were verified, confirmatory pause was performed.  Discussed procedure including risks and potential complications.  Questions answered.  Patient verbalizes understanding and wishes to proceed with VCTE.     Procedure: After providing explanations of the procedure, patient was placed in the supine position with right arm in maximum abduction to allow optimal exposure of right lateral abdomen.  Patient was briefly assessed, Testing was performed in the mid-axillary location, 50Hz Shear Wave pulses were applied and the resulting Shear Wave and Propagation Speed detected with a 3.5 MHz ultrasonic signal, using the FibroScan probe, Skin to liver capsule distance and liver parenchyma were accessed during the entire examination with the FibroScan probe, Patient was instructed to breathe normally and to abstain from sudden movements during the procedure, allowing for random measurements of liver stiffness. At least 10 Shear Waves were produced, Individual measurements of each Shear Wave were calculated.  Patient tolerated the procedure well with no complications.  Meets discharge criteria as was dismissed.  Rates pain 0 out of 10.  Patient will follow up with ordering provider to review results.      Findings  Median liver stiffness score:  9.6  CAP Reading: dB/m:  318    IQR/med %:  10  Interpretation  Fibrosis interpretation is based on medial liver stiffness - Kilopascal (kPa).    Fibrosis Stage:  F3  Steatosis interpretation is based on controlled attenuation parameter - (dB/m).    Steatosis Grade:  S3

## 2020-03-11 ENCOUNTER — TELEPHONE (OUTPATIENT)
Dept: INTERNAL MEDICINE | Facility: CLINIC | Age: 65
End: 2020-03-11

## 2020-03-11 NOTE — TELEPHONE ENCOUNTER
----- Message from Es Bacon LPN sent at 2/24/2020  4:57 PM CST -----  Regarding: F/u sx   ----- Message from Baron Manzanares sent at 2/21/2020 11:33 AM CST -----  Contact: Daughter/ Chani [phone]  Would like to get medical advice.  Symptoms (please be specific):  Dizziness  How long has patient had these symptoms:  2 days  Pharmacy name and phone #:  [id number] - [place] - [address] AT [facility] [address]   [phone] (Phone) [phone] (Fax)     Patient wants to know if he should have any labs done to figure out why he's having this symptom

## 2020-03-11 NOTE — TELEPHONE ENCOUNTER
Please let him know that all of his lab was normal except his blood sugar was elevated. He should schedule an urgent care appt to have his condition re-evaluated

## 2020-03-13 ENCOUNTER — CLINICAL SUPPORT (OUTPATIENT)
Dept: AUDIOLOGY | Facility: CLINIC | Age: 65
End: 2020-03-13
Payer: MEDICARE

## 2020-03-13 ENCOUNTER — OFFICE VISIT (OUTPATIENT)
Dept: OTOLARYNGOLOGY | Facility: CLINIC | Age: 65
End: 2020-03-13
Payer: MEDICARE

## 2020-03-13 VITALS
HEART RATE: 68 BPM | BODY MASS INDEX: 29.83 KG/M2 | TEMPERATURE: 99 F | DIASTOLIC BLOOD PRESSURE: 62 MMHG | WEIGHT: 185.63 LBS | HEIGHT: 66 IN | SYSTOLIC BLOOD PRESSURE: 110 MMHG

## 2020-03-13 DIAGNOSIS — H61.23 BILATERAL IMPACTED CERUMEN: ICD-10-CM

## 2020-03-13 DIAGNOSIS — R26.89 IMBALANCE: ICD-10-CM

## 2020-03-13 DIAGNOSIS — H90.6 MIXED CONDUCTIVE AND SENSORINEURAL HEARING LOSS OF BOTH EARS: Primary | ICD-10-CM

## 2020-03-13 DIAGNOSIS — H90.3 SENSORINEURAL HEARING LOSS (SNHL) OF BOTH EARS: Primary | ICD-10-CM

## 2020-03-13 DIAGNOSIS — R42 VERTIGO: ICD-10-CM

## 2020-03-13 DIAGNOSIS — H61.303 ACQUIRED STENOSIS OF BOTH EXTERNAL EAR CANALS: ICD-10-CM

## 2020-03-13 PROCEDURE — 1101F PT FALLS ASSESS-DOCD LE1/YR: CPT | Mod: HCNC,CPTII,S$GLB, | Performed by: OTOLARYNGOLOGY

## 2020-03-13 PROCEDURE — 3074F PR MOST RECENT SYSTOLIC BLOOD PRESSURE < 130 MM HG: ICD-10-PCS | Mod: HCNC,CPTII,S$GLB, | Performed by: OTOLARYNGOLOGY

## 2020-03-13 PROCEDURE — 92557 PR COMPREHENSIVE HEARING TEST: ICD-10-PCS | Mod: HCNC,S$GLB,, | Performed by: AUDIOLOGIST

## 2020-03-13 PROCEDURE — 3008F BODY MASS INDEX DOCD: CPT | Mod: HCNC,CPTII,S$GLB, | Performed by: OTOLARYNGOLOGY

## 2020-03-13 PROCEDURE — 3074F SYST BP LT 130 MM HG: CPT | Mod: HCNC,CPTII,S$GLB, | Performed by: OTOLARYNGOLOGY

## 2020-03-13 PROCEDURE — 3008F PR BODY MASS INDEX (BMI) DOCUMENTED: ICD-10-PCS | Mod: HCNC,CPTII,S$GLB, | Performed by: OTOLARYNGOLOGY

## 2020-03-13 PROCEDURE — 92557 COMPREHENSIVE HEARING TEST: CPT | Mod: HCNC,S$GLB,, | Performed by: AUDIOLOGIST

## 2020-03-13 PROCEDURE — 99999 PR PBB SHADOW E&M-EST. PATIENT-LVL IV: ICD-10-PCS | Mod: PBBFAC,HCNC,, | Performed by: OTOLARYNGOLOGY

## 2020-03-13 PROCEDURE — 99999 PR PBB SHADOW E&M-EST. PATIENT-LVL I: ICD-10-PCS | Mod: PBBFAC,HCNC,, | Performed by: AUDIOLOGIST

## 2020-03-13 PROCEDURE — G0268 REMOVAL OF IMPACTED WAX MD: HCPCS | Mod: HCNC,S$GLB,, | Performed by: OTOLARYNGOLOGY

## 2020-03-13 PROCEDURE — 99204 OFFICE O/P NEW MOD 45 MIN: CPT | Mod: 25,HCNC,S$GLB, | Performed by: OTOLARYNGOLOGY

## 2020-03-13 PROCEDURE — 92567 PR TYMPA2METRY: ICD-10-PCS | Mod: HCNC,S$GLB,, | Performed by: AUDIOLOGIST

## 2020-03-13 PROCEDURE — 1101F PR PT FALLS ASSESS DOC 0-1 FALLS W/OUT INJ PAST YR: ICD-10-PCS | Mod: HCNC,CPTII,S$GLB, | Performed by: OTOLARYNGOLOGY

## 2020-03-13 PROCEDURE — 99204 PR OFFICE/OUTPT VISIT, NEW, LEVL IV, 45-59 MIN: ICD-10-PCS | Mod: 25,HCNC,S$GLB, | Performed by: OTOLARYNGOLOGY

## 2020-03-13 PROCEDURE — 99999 PR PBB SHADOW E&M-EST. PATIENT-LVL IV: CPT | Mod: PBBFAC,HCNC,, | Performed by: OTOLARYNGOLOGY

## 2020-03-13 PROCEDURE — 92567 TYMPANOMETRY: CPT | Mod: HCNC,S$GLB,, | Performed by: AUDIOLOGIST

## 2020-03-13 PROCEDURE — 3078F PR MOST RECENT DIASTOLIC BLOOD PRESSURE < 80 MM HG: ICD-10-PCS | Mod: HCNC,CPTII,S$GLB, | Performed by: OTOLARYNGOLOGY

## 2020-03-13 PROCEDURE — 99999 PR PBB SHADOW E&M-EST. PATIENT-LVL I: CPT | Mod: PBBFAC,HCNC,, | Performed by: AUDIOLOGIST

## 2020-03-13 PROCEDURE — G0268 PR REMOVAL OF IMPACTED WAX MD: ICD-10-PCS | Mod: HCNC,S$GLB,, | Performed by: OTOLARYNGOLOGY

## 2020-03-13 PROCEDURE — 3078F DIAST BP <80 MM HG: CPT | Mod: HCNC,CPTII,S$GLB, | Performed by: OTOLARYNGOLOGY

## 2020-03-13 NOTE — PATIENT INSTRUCTIONS
PROCEDURE:  Removal Bilateral Cerumen Impactions:  Procedure explained to patient with risks and complications.  Accepted by patient verbally.        Bilateral severe cerumen impactions removed with Aural speculum and Microscope:  Aural speculum and microscopic visualization with instrumentation, alligator forceps, suction and warm water irrigation. All cerumen removed.  TM membranes were normal with micro examination after removal of wax procedure, bilaterally.    1.)  Patient to have annual audiogram/tympanogram.  2.)  Return to ENT yearly or as needed for change in hearing or other ENT symptoms of concern.  3.)  Keep ears dry.  4.)  Procedure:  Removal Bilateral Cerumen Impactions:  Bilateral severe cerumen impactions removed with microscopic visualization, instrumentation, suction and warm water irrigation.  TM membranes were normal with micro examination after removal of wax procedure, bilaterally.  5.)  Appointment with Dr. Davy Ramsay for stenosis of bilateral external ear canals.

## 2020-03-13 NOTE — PROGRESS NOTES
Audiologic Evaluation    Donald Warren Abadie was seen on the above date for a hearing evaluation. Donald Warren Abadie reports decreased hearing sensitivity, tinnitus, aural fullness and dizziness. Donald Warren Abadie denies decreased hearing sensitivity, tinnitus, aural fullness and dizziness.    Tympanometry indicated no discernible middle ear pressure peak with normal ear canal volume (type B tympanogram) in each ear.     Audiometric testing via insert ear phones indicated a moderate to profound mixed hearing loss for 250-8000 Hz in each ear. Pure tone average and speech recognition threshold were in good agreement. Fair speech discrimination ability was demonstrated in quiet when novel words were presented at an amplified level in each ear.      Recommendations:  1) Otologic consultation.  2) Repeat audiometric testing following medical intervention.  3) Continued use of amplification and periodic follow-up with dispensing audiologist.  4) Hearing protection in the presence of excessive noise.

## 2020-03-13 NOTE — PROGRESS NOTES
"OTOLARYNGOLOGY CLINIC    Subjective:       Patient ID: Donald Warren Abadie is a 64 y.o. male.    Chief Complaint: Dizziness (When bends over.  Ear wax.) and Hearing Loss    HPI  Patient complains of hearing loss in both ears of gradual onset with hearing aids for over one year.  Patient approximately 3 weeks ago had vertigo symptoms treated with meclizine with symptoms resolving after several doses of medication.  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  Patient does not use q-tips.  No tinnitus.  No otalgia.        Past Medical History:   Diagnosis Date    A-fib     Alcohol abuse     Anxiety     Arthritis     Chronic gout     Diabetes mellitus     DM (diabetes mellitus) 11/16/2016    "boarderline, not taking any meds currently"    Fatty liver     Hyperlipidemia     Renal cell cancer 2014    S/P TKR (total knee replacement), right 6/27/2019       Past Surgical History:   Procedure Laterality Date    ABSCESS DRAINAGE      perirectal    COLONOSCOPY      HAND SURGERY Left     KIDNEY SURGERY      partial left kidney removal - [place]    PARTIAL NEPHRECTOMY Left August 2014    PERCUTANEOUS CRYOTHERAPY OF PERIPHERAL NERVE USING LIQUID NITROUS OXIDE IN CLOSED NEEDLE DEVICE Right 6/17/2019    Procedure: CRYOTHERAPY, NERVE, PERIPHERAL, PERCUTANEOUS, USING LIQUID NITROUS OXIDE IN CLOSED NEEDLE DEVICE-right knee iovera;  Surgeon: Donny Hair III, MD;  Location: [facility] CATH LAB;  Service: Pain Management;  Laterality: Right;    TOTAL KNEE ARTHROPLASTY Right 6/27/2019    Procedure: ARTHROPLASTY, KNEE, TOTAL-SAME DAY;  Surgeon: Mikael Huerta MD;  Location: [facility] OR [address] [place] [address];  Service: Orthopedics;  Laterality: Right;         Current Outpatient Medications:     allopurinol (ZYLOPRIM) 100 MG tablet, Take 2 tablets (200 mg total) by mouth once daily., Disp: 180 tablet, Rfl: 3    apixaban (ELIQUIS) 5 mg Tab, Take 1 tablet (5 mg total) by mouth 2 (two) times " daily., Disp: 180 tablet, Rfl: 3    cyanocobalamin (VITAMIN B-12) 1000 MCG tablet, Take 1 tablet (1,000 mcg total) by mouth once daily., Disp: , Rfl:     diclofenac sodium (VOLTAREN) 1 % Gel, Apply 2 g topically once daily., Disp: 1 Tube, Rfl: 3    flecainide (TAMBOCOR) 100 MG Tab, Take 1 tablet (100 mg total) by mouth every 12 (twelve) hours., Disp: 180 tablet, Rfl: 3    folic acid (FOLVITE) 1 MG tablet, Take 1 tablet (1 mg total) by mouth once daily., Disp: 30 tablet, Rfl: 11    meclizine (ANTIVERT) 25 mg tablet, Take 1 tablet (25 mg total) by mouth 2 (two) times daily as needed., Disp: 30 tablet, Rfl: 1    metFORMIN (GLUCOPHAGE-XR) 500 MG 24 hr tablet, Take 2 tablets (1,000 mg total) by mouth 2 (two) times daily with meals., Disp: 360 tablet, Rfl: 3    metoprolol tartrate (LOPRESSOR) 25 MG tablet, Take 0.5 tablets (12.5 mg total) by mouth 2 (two) times daily., Disp: 90 tablet, Rfl: 3    multivitamin (THERAGRAN) tablet, Take 1 tablet by mouth once daily., Disp: , Rfl:     omega-3 acid ethyl esters (LOVAZA) 1 gram capsule, Take 2 g by mouth 2 (two) times daily., Disp: , Rfl:     ondansetron (ZOFRAN) 8 MG tablet, Take 1 tablet (8 mg total) by mouth every 8 (eight) hours as needed for Nausea., Disp: 30 tablet, Rfl: 0    pantoprazole (PROTONIX) 40 MG tablet, Take 1 tablet (40 mg total) by mouth once daily., Disp: 30 tablet, Rfl: 11    ranitidine (ZANTAC) 300 MG tablet, Take 1 tablet (300 mg total) by mouth every evening., Disp: 30 tablet, Rfl: 11    rOPINIRole (REQUIP) 1 MG tablet, Take 1 tablet (1 mg total) by mouth every evening., Disp: 30 tablet, Rfl: 11    rosuvastatin (CRESTOR) 20 MG tablet, Take 1 tablet (20 mg total) by mouth once daily., Disp: 90 tablet, Rfl: 3    SITagliptin (JANUVIA) 100 MG Tab, Take 1 tablet (100 mg total) by mouth once daily., Disp: 90 tablet, Rfl: 3    busPIRone (BUSPAR) 15 MG tablet, Take 1 tablet (15 mg total) by mouth 2 (two) times daily., Disp: 60 tablet, Rfl:  2    Review of patient's allergies indicates:   Allergen Reactions    No known drug allergies        Social History     Socioeconomic History    Marital status: Single     Spouse name: Not on file    Number of children: Not on file    Years of education: Not on file    Highest education level: Not on file   Occupational History     Employer: [employer]   Social Needs    Financial resource strain: Not on file    Food insecurity:     Worry: Not on file     Inability: Not on file    Transportation needs:     Medical: Not on file     Non-medical: Not on file   Tobacco Use    Smoking status: Never Smoker    Smokeless tobacco: Never Used   Substance and Sexual Activity    Alcohol use: Not Currently     Comment: reports quiting alcohol 3mths ago    Drug use: No    Sexual activity: Not on file   Lifestyle    Physical activity:     Days per week: Not on file     Minutes per session: Not on file    Stress: Not on file   Relationships    Social connections:     Talks on phone: Not on file     Gets together: Not on file     Attends [employer] service: Not on file     Active member of club or organization: Not on file     Attends meetings of clubs or organizations: Not on file     Relationship status: Not on file   Other Topics Concern    Patient feels they ought to cut down on drinking/drug use Not Asked    Patient annoyed by others criticizing their drinking/drug use Not Asked    Patient has felt bad or guilty about drinking/drug use Not Asked    Patient has had a drink/used drugs as an eye opener in the AM Not Asked   Social History Narrative    Not on file       Family History   Problem Relation Age of Onset    Lung cancer Father     Colon cancer Father     Diabetes Mother     Lung cancer Sister     Colon polyps Brother     Cirrhosis Neg Hx        Review of Systems   Constitutional: Negative for fatigue and fever.   HENT: Positive for hearing loss. Negative for congestion, ear discharge,  ear pain, facial swelling, nosebleeds, postnasal drip, rhinorrhea, sinus pressure, sneezing, sore throat, tinnitus and voice change.    Eyes: Negative for discharge.   Respiratory: Negative for cough.    Cardiovascular: Negative for chest pain.   Gastrointestinal: Negative for vomiting.   Genitourinary: Negative for difficulty urinating.   Musculoskeletal: Negative for neck pain.   Skin: Negative for rash.   Neurological: Negative for headaches.   Hematological: Negative for adenopathy.   Psychiatric/Behavioral: Negative for sleep disturbance.       Objective:     Physical Exam   Constitutional: He is oriented to person, place, and time. He appears well-developed and well-nourished.   HENT:   Head: Normocephalic and atraumatic.   Right Ear: Tympanic membrane and external ear normal. No mastoid tenderness. Tympanic membrane is not erythematous. Decreased hearing is noted.   Left Ear: Tympanic membrane and external ear normal. No mastoid tenderness. Tympanic membrane is not erythematous. Decreased hearing is noted.   Ears:    Nose: Septal deviation present. No rhinorrhea, sinus tenderness or nasal deformity. No epistaxis. Right sinus exhibits no maxillary sinus tenderness and no frontal sinus tenderness. Left sinus exhibits no maxillary sinus tenderness and no frontal sinus tenderness.   Mouth/Throat: Uvula is midline, oropharynx is clear and moist and mucous membranes are normal. No oral lesions. No uvula swelling. No oropharyngeal exudate or posterior oropharyngeal erythema.       Eyes: Pupils are equal, round, and reactive to light. EOM are normal. Right eye exhibits no discharge. Left eye exhibits no discharge.   Neck: Neck supple. No thyromegaly present.   Pulmonary/Chest: Effort normal.   Musculoskeletal: Normal range of motion.   Lymphadenopathy:     He has no cervical adenopathy.   Neurological: He is alert and oriented to person, place, and time.   Skin: Skin is warm.   Psychiatric: He has a normal mood and  affect.        Assessment & Plan:     PROCEDURE:  Removal Bilateral Cerumen Impactions:  Procedure explained to patient with risks and complications.  Accepted by patient verbally.        Bilateral severe cerumen impactions removed with Aural speculum and Microscope:  Aural speculum and microscopic visualization with instrumentation, alligator forceps, suction and warm water irrigation. All cerumen removed.  TM membranes were normal with micro examination after removal of wax procedure, bilaterally.  Microscopic examination shows small EAC with unable to visualize TM's completely.  Will there fore have patient see Dr. Davy Ramsay for further evalluation.          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  Accepted by patient verbally.        Bilateral severe cerumen impactions removed with Aural speculum and Microscope:  Aural speculum and microscopic visualization with instrumentation, alligator forceps, suction and warm water irrigation. All cerumen removed.  TM membranes were normal with micro examination after removal of wax procedure, bilaterally.    1.)  Patient to have annual audiogram/tympanogram.  2.)  Return to ENT yearly or as needed for change in hearing or other ENT symptoms of concern.  3.)  Keep ears dry.  4.)  Procedure:  Removal Bilateral Cerumen Impactions:  Bilateral severe cerumen impactions removed with microscopic visualization, instrumentation, suction and warm water irrigation.  TM membranes were normal with micro examination after removal of wax procedure, bilaterally.  5.)  Appointment with Dr. Davy Ramsay for stenosis of bilateral external  ear canals.

## 2020-03-16 ENCOUNTER — PATIENT OUTREACH (OUTPATIENT)
Dept: ADMINISTRATIVE | Facility: OTHER | Age: 65
End: 2020-03-16

## 2020-03-17 ENCOUNTER — PATIENT MESSAGE (OUTPATIENT)
Dept: ADMINISTRATIVE | Facility: OTHER | Age: 65
End: 2020-03-17

## 2020-03-23 ENCOUNTER — TELEPHONE (OUTPATIENT)
Dept: OTOLARYNGOLOGY | Facility: CLINIC | Age: 65
End: 2020-03-23

## 2020-03-25 ENCOUNTER — PATIENT OUTREACH (OUTPATIENT)
Dept: ADMINISTRATIVE | Facility: OTHER | Age: 65
End: 2020-03-25

## 2020-03-26 ENCOUNTER — TELEPHONE (OUTPATIENT)
Dept: DIABETES | Facility: CLINIC | Age: 65
End: 2020-03-26

## 2020-04-06 ENCOUNTER — TELEPHONE (OUTPATIENT)
Dept: ENDOCRINOLOGY | Facility: CLINIC | Age: 65
End: 2020-04-06

## 2020-04-06 NOTE — TELEPHONE ENCOUNTER
----- Message from Tai Martinez MA sent at 4/3/2020  4:43 PM CDT -----  Contact: PT       ----- Message -----  From: Irma Seals  Sent: 4/3/2020   2:32 PM CDT  To: Carmen Baker Staff    PT called [employer] to get more test strips, needles and alcohol swabs but they said [facility] denied it and he's trying to find out why.     Callback: [phone]

## 2020-04-08 DIAGNOSIS — E78.1 HYPERTRIGLYCERIDEMIA: ICD-10-CM

## 2020-04-08 RX ORDER — ROSUVASTATIN CALCIUM 20 MG/1
20 TABLET, COATED ORAL DAILY
Qty: 90 TABLET | Refills: 3 | Status: SHIPPED | OUTPATIENT
Start: 2020-04-08 | End: 2021-01-25

## 2020-04-09 ENCOUNTER — PATIENT OUTREACH (OUTPATIENT)
Dept: ADMINISTRATIVE | Facility: OTHER | Age: 65
End: 2020-04-09

## 2020-04-13 ENCOUNTER — CLINICAL SUPPORT (OUTPATIENT)
Dept: DIABETES | Facility: CLINIC | Age: 65
End: 2020-04-13
Payer: MEDICARE

## 2020-04-13 DIAGNOSIS — E11.42 TYPE 2 DIABETES MELLITUS WITH DIABETIC POLYNEUROPATHY, WITHOUT LONG-TERM CURRENT USE OF INSULIN: ICD-10-CM

## 2020-04-13 DIAGNOSIS — E11.42 TYPE 2 DIABETES MELLITUS WITH DIABETIC POLYNEUROPATHY, WITHOUT LONG-TERM CURRENT USE OF INSULIN: Primary | ICD-10-CM

## 2020-04-13 PROCEDURE — G0108 DIAB MANAGE TRN  PER INDIV: HCPCS | Mod: HCNC,S$GLB,, | Performed by: INTERNAL MEDICINE

## 2020-04-13 PROCEDURE — G0108 PR DIAB MANAGE TRN  PER INDIV: ICD-10-PCS | Mod: HCNC,S$GLB,, | Performed by: INTERNAL MEDICINE

## 2020-04-13 PROCEDURE — 99999 PR PBB SHADOW E&M-EST. PATIENT-LVL I: ICD-10-PCS | Mod: PBBFAC,HCNC,,

## 2020-04-13 PROCEDURE — 99999 PR PBB SHADOW E&M-EST. PATIENT-LVL I: CPT | Mod: PBBFAC,HCNC,,

## 2020-04-13 NOTE — PROGRESS NOTES
Diabetes Care Specialist Telehealth Visit Note    This visit was conducted using Telehealth/Telephonic Technology.  It was in the patient's best interest to receive diabetes self-management education and support services in this format to prevent the exposure to COVID-19.       Diabetes Education  Author: Lamar Hernandez RN, CDE  Date: 4/13/2020    Diabetes Care Management Summary  Diabetes Education Record Assessment/Progress: Post Program/Follow-up  Current Diabetes Risk Level: Moderate     Last A1c:   Lab Results   Component Value Date    HGBA1C 8.2 (H) 01/17/2020     Last visit with Diabetes Educator: : 10/24/2019    Diabetes Type  Diabetes Type : Type II    Diabetes History  Current Treatment: Oral Medication(Metformin 500 mg 2 in am and 2 in pm;  Januvia 100 mg daily)  Reviewed Problem List with Patient: Yes    Health Maintenance was reviewed today with patient. Discussed with patient importance of routine eye exams, foot exams/foot care, blood work (i.e.: A1c, microalbumin, and lipid), dental visits, yearly flu vaccine, and pneumonia vaccine as indicated by PCP. Patient verbalized understanding.     Health Maintenance Topics with due status: Not Due       Topic Last Completion Date    TETANUS VACCINE 01/26/2011    Urine Microalbumin 10/17/2019    Hemoglobin A1c 01/17/2020    Lipid Panel 02/07/2020    Foot Exam 02/21/2020    Low Dose Statin 04/08/2020     Health Maintenance Due   Topic Date Due    Colonoscopy  06/22/2019    Pneumococcal Vaccine (65+ High/Highest Risk) (1 of 2 - PCV13) 09/21/2019    Eye Exam  02/07/2020     Monitoring   Monitoring: Other  Blood Glucose Logs: No Oral recall 110 to 160-180 post meals    Exercise   Exercise Type: walking    Current Diabetes Treatment   Current Treatment: Oral Medication(Metformin 500 mg 2 in am and 2 in pm;  Januvia 100 mg daily)       Barriers to Change  Barriers to Change: None  Learning Challenges : None    Readiness to Learn   Readiness to Learn :  Acceptance      Diabetes Education Assessment/Progress  Patient was seen today for follow up initial assessment. The visit was focused on nutrition and medications.  His A1c completed on 01/17/2020 has decreased to 8.2% down from initial.  States has made significant changes to his diet.  We reviewed carb counting again.  He is not measuring portions.  Reviewed label reading. Explained the importance of looking at the label for the serving sizes and carb amt for that carb.  He is eating more than 60 gram per meal and a recent meal with a can of beans and 2 pieces of bread was 102 gram. Also provided with suggestions to keep snack carbs at 15-20 gram.    Nutrition (Incorporating nutritional management into one's lifestyle): Discussion, Needs Review, Comprehends Key Points, Written Materials Provided  Physical Activity (incorporating physical activity into one's lifestyle): Discussion, Comprehends Key Points, Written Materials Provided  Medications (states correct name, dose, onset, peak, duration, side effects & timing of meds): Discussion, Needs Review, Comprehends Key Points, Written Materials Provided   - reviewed MOA of metformin and Januvia which was started in Feb 2020  Monitoring (monitoring blood glucose/other parameters & using results): Discussion, Comprehends Key Points, Written Materials Provided  Acute Complications (preventing, detecting, and treating acute complications): Discussion, Comprehends Key Points, Written Materials Provided    Goals  Patient has selected/evaluated goals during today's session: Yes, selected  Healthy Eating: (Keep carb intake at 2-3 per meal; make better choices for snacks and keep under 15 gram)    Diabetes Care Plan/Intervention  Education Plan/Intervention: Individual Follow-Up DSMT(make 6 mth f/u appt for 10/2020)    Diabetes Meal Plan  Carbohydrate Per Meal: 45-60g  Carbohydrate Per Snack : 15-20g    Today's Self-Management Care Plan was developed with the patient's input  and is based on barriers identified during today's assessment.    The long and short-term goals in the care plan were written with the patient/caregiver's input. The patient has agreed to work toward these goals to improve his overall diabetes control.      The patient received a copy of today's self-management plan and verbalized understanding of the care plan, goals, and all of today's instructions.      The patient was encouraged to communicate with his physician and care team regarding his condition(s) and treatment.  I provided the patient with my contact information today and encouraged him to contact me via phone or patient portal as needed.     Education Units of Time   Time Spent: 60 min

## 2020-04-16 ENCOUNTER — TELEPHONE (OUTPATIENT)
Dept: INTERNAL MEDICINE | Facility: CLINIC | Age: 65
End: 2020-04-16

## 2020-04-16 RX ORDER — PANTOPRAZOLE SODIUM 40 MG/1
40 TABLET, DELAYED RELEASE ORAL DAILY
Qty: 90 TABLET | Refills: 3 | Status: SHIPPED | OUTPATIENT
Start: 2020-04-16 | End: 2021-01-04 | Stop reason: SDUPTHER

## 2020-04-16 NOTE — TELEPHONE ENCOUNTER
----- Message from Bacilio Larry MD sent at 4/16/2020 10:55 AM CDT -----  Please contact and notify that his ranitidine was changed to protonix. A prescription was sent to [facility].

## 2020-04-29 ENCOUNTER — TELEPHONE (OUTPATIENT)
Dept: CARDIOLOGY | Facility: CLINIC | Age: 65
End: 2020-04-29

## 2020-04-29 NOTE — TELEPHONE ENCOUNTER
----- Message from Javier Sanchez MA sent at 4/29/2020  2:25 PM CDT -----  Contact: Pt Called  Please call Mr. Abadie at [phone].  He would like to speak to you in reference to   Dr. Tolentino prescribing Eliquis.  He states he was trying to get his medication refilled and it was too expensive.  He'll like to know if Dr Tolentino could prescribe something else.  LOV 01/15/2020    Thank, You,  Ching

## 2020-04-29 NOTE — TELEPHONE ENCOUNTER
Discussed reasons cost of Eliquis may be up. Patient states he was advised that it may be due to deductible or certain plan amount that has to be paid out of pocket first. Patient would like to maybe get off of medication if possible at some point as states does not feel has had any afib episodes in a while after making lifestyle changes. Patient states has medication at this time and appt made for routine follow up to discuss with MD per patient request.

## 2020-05-01 DIAGNOSIS — E11.42 TYPE 2 DIABETES MELLITUS WITH DIABETIC POLYNEUROPATHY, WITHOUT LONG-TERM CURRENT USE OF INSULIN: ICD-10-CM

## 2020-05-01 NOTE — TELEPHONE ENCOUNTER
----- Message from Oriana Acosta sent at 5/1/2020  3:24 PM CDT -----  Contact: Pt  SITagliptin (JANUVIA) 100 MG Tab    [id number] - [place]  [address]  [address] [facility] [address] [facility] [address]    # Pt lost the remaining medication from previous prescription #      Pt # [phone]

## 2020-05-19 RX ORDER — ALLOPURINOL 100 MG/1
200 TABLET ORAL DAILY
Qty: 180 TABLET | Refills: 3 | Status: SHIPPED | OUTPATIENT
Start: 2020-05-19 | End: 2020-11-30 | Stop reason: SDUPTHER

## 2020-05-20 NOTE — TELEPHONE ENCOUNTER
[employer] fax refill request for ranitidine, medication was changed on 04/16/20 to Pantoprazole 40 mg tab. Sending rx confirmation sent to [facility] pharmacy.

## 2020-05-21 ENCOUNTER — OUTPATIENT CASE MANAGEMENT (OUTPATIENT)
Dept: ADMINISTRATIVE | Facility: OTHER | Age: 65
End: 2020-05-21

## 2020-05-21 DIAGNOSIS — E11.42 TYPE 2 DIABETES MELLITUS WITH DIABETIC POLYNEUROPATHY, WITHOUT LONG-TERM CURRENT USE OF INSULIN: ICD-10-CM

## 2020-05-22 ENCOUNTER — PATIENT MESSAGE (OUTPATIENT)
Dept: RHEUMATOLOGY | Facility: CLINIC | Age: 65
End: 2020-05-22

## 2020-05-27 ENCOUNTER — LAB VISIT (OUTPATIENT)
Dept: LAB | Facility: HOSPITAL | Age: 65
End: 2020-05-27
Payer: MEDICARE

## 2020-05-27 DIAGNOSIS — E11.42 TYPE 2 DIABETES MELLITUS WITH DIABETIC POLYNEUROPATHY, WITHOUT LONG-TERM CURRENT USE OF INSULIN: ICD-10-CM

## 2020-05-27 LAB
ALBUMIN SERPL BCP-MCNC: 4.4 G/DL (ref 3.5–5.2)
ALP SERPL-CCNC: 85 U/L (ref 55–135)
ALT SERPL W/O P-5'-P-CCNC: 15 U/L (ref 10–44)
ANION GAP SERPL CALC-SCNC: 11 MMOL/L (ref 8–16)
AST SERPL-CCNC: 21 U/L (ref 10–40)
BILIRUB SERPL-MCNC: 0.5 MG/DL (ref 0.1–1)
BUN SERPL-MCNC: 6 MG/DL (ref 8–23)
CALCIUM SERPL-MCNC: 9.8 MG/DL (ref 8.7–10.5)
CHLORIDE SERPL-SCNC: 103 MMOL/L (ref 95–110)
CO2 SERPL-SCNC: 24 MMOL/L (ref 23–29)
CREAT SERPL-MCNC: 0.9 MG/DL (ref 0.5–1.4)
EST. GFR  (AFRICAN AMERICAN): >60 ML/MIN/1.73 M^2
EST. GFR  (NON AFRICAN AMERICAN): >60 ML/MIN/1.73 M^2
GLUCOSE SERPL-MCNC: 117 MG/DL (ref 70–110)
POTASSIUM SERPL-SCNC: 4.3 MMOL/L (ref 3.5–5.1)
PROT SERPL-MCNC: 7.2 G/DL (ref 6–8.4)
SODIUM SERPL-SCNC: 138 MMOL/L (ref 136–145)

## 2020-05-27 PROCEDURE — 36415 COLL VENOUS BLD VENIPUNCTURE: CPT | Mod: HCNC

## 2020-05-27 PROCEDURE — 83036 HEMOGLOBIN GLYCOSYLATED A1C: CPT | Mod: HCNC

## 2020-05-27 PROCEDURE — 80053 COMPREHEN METABOLIC PANEL: CPT | Mod: HCNC

## 2020-05-28 LAB
ESTIMATED AVG GLUCOSE: 143 MG/DL (ref 68–131)
HBA1C MFR BLD HPLC: 6.6 % (ref 4–5.6)

## 2020-05-29 ENCOUNTER — PATIENT MESSAGE (OUTPATIENT)
Dept: ENDOCRINOLOGY | Facility: CLINIC | Age: 65
End: 2020-05-29

## 2020-06-01 NOTE — TELEPHONE ENCOUNTER
Spoke with patient informed him of provider recommendations appt scheduled this week to see ZULEMA Richardson.

## 2020-06-02 ENCOUNTER — PATIENT OUTREACH (OUTPATIENT)
Dept: ADMINISTRATIVE | Facility: OTHER | Age: 65
End: 2020-06-02

## 2020-06-03 ENCOUNTER — OFFICE VISIT (OUTPATIENT)
Dept: ENDOCRINOLOGY | Facility: CLINIC | Age: 65
End: 2020-06-03
Payer: MEDICARE

## 2020-06-03 DIAGNOSIS — E11.42 TYPE 2 DIABETES MELLITUS WITH DIABETIC POLYNEUROPATHY, WITHOUT LONG-TERM CURRENT USE OF INSULIN: Primary | ICD-10-CM

## 2020-06-03 DIAGNOSIS — C64.2 RENAL CELL CARCINOMA OF LEFT KIDNEY: ICD-10-CM

## 2020-06-03 DIAGNOSIS — E66.3 OVERWEIGHT (BMI 25.0-29.9): ICD-10-CM

## 2020-06-03 DIAGNOSIS — E11.69 HYPERLIPIDEMIA ASSOCIATED WITH TYPE 2 DIABETES MELLITUS: ICD-10-CM

## 2020-06-03 DIAGNOSIS — E78.5 HYPERLIPIDEMIA ASSOCIATED WITH TYPE 2 DIABETES MELLITUS: ICD-10-CM

## 2020-06-03 PROCEDURE — 99214 PR OFFICE/OUTPT VISIT, EST, LEVL IV, 30-39 MIN: ICD-10-PCS | Mod: HCNC,95,, | Performed by: NURSE PRACTITIONER

## 2020-06-03 PROCEDURE — 99499 RISK ADDL DX/OHS AUDIT: ICD-10-PCS | Mod: HCNC,95,, | Performed by: NURSE PRACTITIONER

## 2020-06-03 PROCEDURE — 3044F PR MOST RECENT HEMOGLOBIN A1C LEVEL <7.0%: ICD-10-PCS | Mod: HCNC,CPTII,95, | Performed by: NURSE PRACTITIONER

## 2020-06-03 PROCEDURE — 99214 OFFICE O/P EST MOD 30 MIN: CPT | Mod: HCNC,95,, | Performed by: NURSE PRACTITIONER

## 2020-06-03 PROCEDURE — 3044F HG A1C LEVEL LT 7.0%: CPT | Mod: HCNC,CPTII,95, | Performed by: NURSE PRACTITIONER

## 2020-06-03 PROCEDURE — 1101F PT FALLS ASSESS-DOCD LE1/YR: CPT | Mod: HCNC,CPTII,95, | Performed by: NURSE PRACTITIONER

## 2020-06-03 PROCEDURE — 99499 UNLISTED E&M SERVICE: CPT | Mod: HCNC,95,, | Performed by: NURSE PRACTITIONER

## 2020-06-03 PROCEDURE — 1101F PR PT FALLS ASSESS DOC 0-1 FALLS W/OUT INJ PAST YR: ICD-10-PCS | Mod: HCNC,CPTII,95, | Performed by: NURSE PRACTITIONER

## 2020-06-03 NOTE — ASSESSMENT & PLAN NOTE
-- Labs prior to follow up.  -- A1c goal <7.5%.  -- Medications discussed:  MFM - GI issues  GLP1-DPP4   CANTU   -- Reviewed logs/CGM:  Glucose unremarkable with A1c at goal.  Patient also reports that since his knee surgery he has increased exercise and noticed the positive impact it has on blood glucose.   Patient not tolerating MFM and wants to get off of it due to recent recall  Will stop MFM and continue DPP4 with instruction to check glucose fasting, before dinner and before bed.  Instructed to send glucose logs in 4 days.  Reach out to me sooner for any glucose <70 or consistently >200.  -- Medication Changes:   STOP:  MFM  CONTINUE:  Januvia 100mg daily   -- Reviewed goals of therapy are to get the best control we can without hypoglycemia.  -- Reviewed patient's current insulin regimen. Clarified proper insulin dose and timing in relation to meals, etc. Insulin injection sites and proper rotation instructed.    -- Advised frequent self blood glucose monitoring.  Patient encouraged to document glucose results and bring them to every clinic visit.  -- Hypoglycemia precautions discussed. Instructed on precautions before driving.    -- Call for Bg repeatedly < 90 or > 180.   -- Close adherence to lifestyle changes recommended.   -- Periodic follow ups for eye evaluations, foot care and dental care suggested.

## 2020-06-24 ENCOUNTER — TELEPHONE (OUTPATIENT)
Dept: ENDOCRINOLOGY | Facility: CLINIC | Age: 65
End: 2020-06-24

## 2020-06-24 NOTE — TELEPHONE ENCOUNTER
----- Message from Nicole Montoya sent at 6/24/2020  4:49 PM CDT -----  Contact: Daughter Chani [phone]  Patient's daughter is requesting to speak with nurse regarding medication and blood sugar levels. Please advise.

## 2020-06-24 NOTE — TELEPHONE ENCOUNTER
Pt states his bg has been running between 200 and 300 since stopping the Metformin. He wants to know if there is something else he can take.

## 2020-06-25 ENCOUNTER — TELEPHONE (OUTPATIENT)
Dept: ENDOCRINOLOGY | Facility: CLINIC | Age: 65
End: 2020-06-25

## 2020-06-25 DIAGNOSIS — E11.42 TYPE 2 DIABETES MELLITUS WITH DIABETIC POLYNEUROPATHY, WITHOUT LONG-TERM CURRENT USE OF INSULIN: Primary | ICD-10-CM

## 2020-06-25 RX ORDER — DULAGLUTIDE 0.75 MG/.5ML
0.75 INJECTION, SOLUTION SUBCUTANEOUS
Qty: 2 ML | Refills: 0 | Status: SHIPPED | OUTPATIENT
Start: 2020-06-25 | End: 2020-07-20

## 2020-06-25 NOTE — PROGRESS NOTES
Patient reports BGs have been 200's off of Metformin. We discussed options. He does not want to contact pharmacy to obtain metformin that is not part of the recall. He states that he had nausea and diarrhea on Metformin in the past. He is willing to stop Januvia and start Trulicity. Instructed to Trulicity 0.75mg weekly for 4 weeks & then increase to 1.5 mg weekly indefinitely. Denies personal history of pancreatitis or family history of thyroid cancer. Discussed demonstration of how to use pen and instructed to call for any problems. Discussed MOA and SE. All questions answered.

## 2020-07-12 ENCOUNTER — PATIENT OUTREACH (OUTPATIENT)
Dept: ADMINISTRATIVE | Facility: OTHER | Age: 65
End: 2020-07-12

## 2020-07-12 NOTE — PROGRESS NOTES
Chart reviewed.   Immunizations: updated  Orders placed: n/a  Upcoming appts to satisfy [facility] topics: Hemoglobin A1c 8/27

## 2020-07-14 ENCOUNTER — OFFICE VISIT (OUTPATIENT)
Dept: CARDIOLOGY | Facility: CLINIC | Age: 65
End: 2020-07-14
Payer: MEDICARE

## 2020-07-14 VITALS
DIASTOLIC BLOOD PRESSURE: 64 MMHG | BODY MASS INDEX: 29.87 KG/M2 | SYSTOLIC BLOOD PRESSURE: 119 MMHG | HEART RATE: 64 BPM | HEIGHT: 66 IN | WEIGHT: 185.88 LBS

## 2020-07-14 DIAGNOSIS — E78.5 HYPERLIPIDEMIA ASSOCIATED WITH TYPE 2 DIABETES MELLITUS: ICD-10-CM

## 2020-07-14 DIAGNOSIS — K76.0 FATTY LIVER: ICD-10-CM

## 2020-07-14 DIAGNOSIS — I48.0 PAROXYSMAL ATRIAL FIBRILLATION: Primary | ICD-10-CM

## 2020-07-14 DIAGNOSIS — E11.42 TYPE 2 DIABETES MELLITUS WITH DIABETIC POLYNEUROPATHY, WITHOUT LONG-TERM CURRENT USE OF INSULIN: ICD-10-CM

## 2020-07-14 DIAGNOSIS — K21.9 GASTROESOPHAGEAL REFLUX DISEASE, ESOPHAGITIS PRESENCE NOT SPECIFIED: ICD-10-CM

## 2020-07-14 DIAGNOSIS — E11.69 HYPERLIPIDEMIA ASSOCIATED WITH TYPE 2 DIABETES MELLITUS: ICD-10-CM

## 2020-07-14 PROCEDURE — 3008F BODY MASS INDEX DOCD: CPT | Mod: HCNC,CPTII,S$GLB, | Performed by: INTERNAL MEDICINE

## 2020-07-14 PROCEDURE — 3078F PR MOST RECENT DIASTOLIC BLOOD PRESSURE < 80 MM HG: ICD-10-PCS | Mod: HCNC,CPTII,S$GLB, | Performed by: INTERNAL MEDICINE

## 2020-07-14 PROCEDURE — 3044F PR MOST RECENT HEMOGLOBIN A1C LEVEL <7.0%: ICD-10-PCS | Mod: HCNC,CPTII,S$GLB, | Performed by: INTERNAL MEDICINE

## 2020-07-14 PROCEDURE — 3074F PR MOST RECENT SYSTOLIC BLOOD PRESSURE < 130 MM HG: ICD-10-PCS | Mod: HCNC,CPTII,S$GLB, | Performed by: INTERNAL MEDICINE

## 2020-07-14 PROCEDURE — 99214 OFFICE O/P EST MOD 30 MIN: CPT | Mod: HCNC,S$GLB,, | Performed by: INTERNAL MEDICINE

## 2020-07-14 PROCEDURE — 1101F PR PT FALLS ASSESS DOC 0-1 FALLS W/OUT INJ PAST YR: ICD-10-PCS | Mod: HCNC,CPTII,S$GLB, | Performed by: INTERNAL MEDICINE

## 2020-07-14 PROCEDURE — 3074F SYST BP LT 130 MM HG: CPT | Mod: HCNC,CPTII,S$GLB, | Performed by: INTERNAL MEDICINE

## 2020-07-14 PROCEDURE — 99999 PR PBB SHADOW E&M-EST. PATIENT-LVL IV: ICD-10-PCS | Mod: PBBFAC,HCNC,, | Performed by: INTERNAL MEDICINE

## 2020-07-14 PROCEDURE — 3078F DIAST BP <80 MM HG: CPT | Mod: HCNC,CPTII,S$GLB, | Performed by: INTERNAL MEDICINE

## 2020-07-14 PROCEDURE — 99999 PR PBB SHADOW E&M-EST. PATIENT-LVL IV: CPT | Mod: PBBFAC,HCNC,, | Performed by: INTERNAL MEDICINE

## 2020-07-14 PROCEDURE — 3044F HG A1C LEVEL LT 7.0%: CPT | Mod: HCNC,CPTII,S$GLB, | Performed by: INTERNAL MEDICINE

## 2020-07-14 PROCEDURE — 1101F PT FALLS ASSESS-DOCD LE1/YR: CPT | Mod: HCNC,CPTII,S$GLB, | Performed by: INTERNAL MEDICINE

## 2020-07-14 PROCEDURE — 3008F PR BODY MASS INDEX (BMI) DOCUMENTED: ICD-10-PCS | Mod: HCNC,CPTII,S$GLB, | Performed by: INTERNAL MEDICINE

## 2020-07-14 PROCEDURE — 99214 PR OFFICE/OUTPT VISIT, EST, LEVL IV, 30-39 MIN: ICD-10-PCS | Mod: HCNC,S$GLB,, | Performed by: INTERNAL MEDICINE

## 2020-07-14 RX ORDER — FLECAINIDE ACETATE 100 MG/1
100 TABLET ORAL EVERY 12 HOURS PRN
Qty: 180 TABLET | Refills: 3 | Status: SHIPPED | OUTPATIENT
Start: 2020-07-14 | End: 2021-07-22

## 2020-07-14 RX ORDER — METOPROLOL TARTRATE 25 MG/1
12.5 TABLET, FILM COATED ORAL 2 TIMES DAILY PRN
Qty: 90 TABLET | Refills: 3 | Status: ON HOLD | OUTPATIENT
Start: 2020-07-14 | End: 2021-11-06 | Stop reason: HOSPADM

## 2020-07-14 NOTE — PROGRESS NOTES
Subjective:   Chief Complaint: Paroxysmal atrial fibrillation (6 month f/u )  Last Clinic Visit: 1/15/2020     History of Present Illness: Donald Warren Abadie is a 64 y.o. gentleman with paroxysmal atrial fibrillation, alcohol abuse, hypertension, hypertriglyceridemia, elevated LFTs, status post total knee replacement, who comes in for follow-up.  Interval History:  Since we saw him last we check cholesterol, and LDL nicely down to 50s on Crestor.  CMP with AST ALT within normal limits.  FibroScan by outside provider with steatosis.  He reports that atrial fibrillation improving, now down to only one episode a month, episodes triggered by gas and iced tea, relieved by belching.  He was taking flecainide and metoprolol daily, but now taking p.r.n..  Always takes metoprolol prior to taking flecainide, only able to take one/2 metoprolol due to bradycardia.  Continuing to not drink.  Eliquis was noted to be expensive, but still prefers this over warfarin.  Recently changed to Trulicity, and hemoglobin A1c down to 6.6%    1/15/2020   He has been doing reasonably well since we saw him last 5 months ago, still reports occasional paroxysmal atrial fibrillation, attributes it to when he forgets to take medications.  D He still takes metoprolol intermittently, afraid to take more due to bradycardia in the past.  Discussed the synergistic role of metoprolol with flecainide today.  Denies any focal neurological deficits.  He is doing better from a rehab standpoint of his right knee, tells the story today of some scar tissue breaking on the front of his knee when he jumped off of his truck, and knee has been moving better since then.    He also reports stopping fenofibrate for elevated triglycerides per our discussion.  Accidentally stopped Crestor the same time, has only been on Crestor back for one week.    8/2/2019  He had an episodewhere he felt his blood pressure being elevated, and took several metoprolol, on top of  diltiazem which we had recently started, and ultimately developed hypotension was admitted to the ER found to have bradycardia, and then had hypotensive PEA cardiac arrest, quick return of sinus rhythm,  It was felt that arrest was in the setting of multiple medications, alcohol.  Since that time he has stopped drinkin No recurrent cardiac arrest.  AFib burden has dramatically decreased now that he no longer drinks.  Medication-wise he states he is currently taking flecainide 100 b.i.d. along with metoprolol p.r.n. with episodes of AFib.  He has an episode of AFib every few weeks.   We decreased his Crestor from 20 mg to 10 mg with an LDL of 11 and some mild transaminitis, repeat lipids in June showed that LDL went from 11 to 110.  No chest pain.    3/4/2019  He has a several year history of paroxysmal atrial fibrillation, very symptomatic, with palpitations, tachycardia, and has been seen in the ER in the past for it.  He is followed with Dr. Giang who he saw last year, and discussed antiarrhythmic therapy versus ablation, elected to pursue antiarrhythmic therapy given severe symptomatic nature of atrial fibrillation at that time.  Also with borderline age, elected to pursue anticoagulation, and currently on apixaban 5 b.i.d., he reports tolerating anticoagulation well, denies any falls, no bleeding, no hematemesis, no hematochezia.  Given alcohol abuse, discussed treating this prior to ablation.  From atrial fibrillation standpoint he reports approximately 1 episode a month, which last anywhere from 15 min to several hours.  He currently is on flecainide 100 b.i.d., and reports compliance with this medication, but also is on metoprolol succinate 25 daily, and only reports intermittent compliance with this medication due to fatigue.  In addition to succinate 25 daily he also has a prescription for 25 tartrate p.r.n., which he takes when he goes into episodes of atrial fibrillation.     He reports a persistent  difficult to control hypertriglyceridemia, and currently on Crestor 20 which was recently increased as well as Lovaza and fenofibrate.  Most recent LDL 11.    He previously has discussed detox therapy with Psychiatry, however they elected for inpatient rehab given cardiac issues, and he declined.    PMHx:  Paroxysmal Atrial Fibrillation  EtOH abuse  Hypertriglyceridemia  Elevated LFTs  Osteoarthritis status post TKR    Review of Systems   Constitution: Negative. Negative for malaise/fatigue.   HENT: Negative.    Eyes: Negative.    Cardiovascular: Negative.  Negative for irregular heartbeat and palpitations.   Respiratory: Negative.    Hematologic/Lymphatic: Negative.    Skin: Negative.    Musculoskeletal: Positive for arthritis and joint pain.   Gastrointestinal: Negative.    Genitourinary: Negative.        Medications:  Current Outpatient Medications on File Prior to Visit   Medication Sig    allopurinoL (ZYLOPRIM) 100 MG tablet Take 2 tablets (200 mg total) by mouth once daily.    apixaban (ELIQUIS) 5 mg Tab Take 1 tablet (5 mg total) by mouth 2 (two) times daily.    cyanocobalamin (VITAMIN B-12) 1000 MCG tablet Take 1 tablet (1,000 mcg total) by mouth once daily.    diclofenac sodium (VOLTAREN) 1 % Gel Apply 2 g topically once daily.    dulaglutide (TRULICITY) 0.75 mg/0.5 mL PnIj Inject 0.5 mLs (0.75 mg total) into the skin every 7 days. Take Trulicity 0.75 mg weekly for one month then increase to 1.5 mg weekly thereafter    dulaglutide (TRULICITY) 1.5 mg/0.5 mL PnIj Inject 1.5 mg into the skin once a week. Take Trulicity 0.75 mg weekly for one month then increase to 1.5 mg weekly thereafter    folic acid (FOLVITE) 1 MG tablet Take 1 tablet (1 mg total) by mouth once daily.    meclizine (ANTIVERT) 25 mg tablet Take 1 tablet (25 mg total) by mouth 2 (two) times daily as needed.    multivitamin (THERAGRAN) tablet Take 1 tablet by mouth once daily.    omega-3 acid ethyl esters (LOVAZA) 1 gram capsule Take  "2 g by mouth 2 (two) times daily.    ondansetron (ZOFRAN) 8 MG tablet Take 1 tablet (8 mg total) by mouth every 8 (eight) hours as needed for Nausea.    pantoprazole (PROTONIX) 40 MG tablet Take 1 tablet (40 mg total) by mouth once daily.    rOPINIRole (REQUIP) 1 MG tablet Take 1 tablet (1 mg total) by mouth every evening.    rosuvastatin (CRESTOR) 20 MG tablet Take 1 tablet (20 mg total) by mouth once daily.    [DISCONTINUED] flecainide (TAMBOCOR) 100 MG Tab Take 1 tablet (100 mg total) by mouth every 12 (twelve) hours.    [DISCONTINUED] metoprolol tartrate (LOPRESSOR) 25 MG tablet Take 0.5 tablets (12.5 mg total) by mouth 2 (two) times daily.    busPIRone (BUSPAR) 15 MG tablet Take 1 tablet (15 mg total) by mouth 2 (two) times daily.     No current facility-administered medications on file prior to visit.      Family History:  Kleber's family history includes Colon cancer in his father; Colon polyps in his brother; Diabetes in his mother; Lung cancer in his father and sister.    Social History:  Kleber reports that he has never smoked. He has never used smokeless tobacco. He reports previous alcohol use. He reports that he does not use drugs.  Retired  Objective:   /64 (BP Location: Left arm, Patient Position: Sitting, BP Method: Medium (Automatic))   Pulse 64   Ht 5' 6" (1.676 m)   Wt 84.3 kg (185 lb 13.6 oz)   BMI 30.00 kg/m²     Physical Exam   Constitutional: He is oriented to person, place, and time and well-developed, well-nourished, and in no distress. No distress.   HENT:   Head: Normocephalic and atraumatic.   Mouth/Throat: No oropharyngeal exudate.   Eyes: EOM are normal. No scleral icterus.   Neck: No JVD present. No tracheal deviation present. No thyromegaly present.   Cardiovascular: Normal rate and regular rhythm. Exam reveals no gallop and no friction rub.   No murmur heard.  Pulmonary/Chest: Effort normal and breath sounds normal. No respiratory distress. He has no wheezes. He has " no rales. He exhibits no tenderness.   Abdominal: Soft. He exhibits no distension. There is no abdominal tenderness. There is no rebound and no guarding.   Musculoskeletal:         General: No edema.      Comments: Incision noted over right knee from prior knee replacement, no surrounding erythema.   Neurological: He is alert and oriented to person, place, and time.   Skin: Skin is warm and dry. He is not diaphoretic. No erythema.   Facial plethora   Psychiatric: Affect normal.     EKG:  My independent visualization of most recent EKG is normal sinus rhythm, nonspecific ST changes, borderline left atrial enlargement    TTE:  10/12/2018  CONCLUSIONS     1 - Normal left ventricular systolic function (EF 60-65%).     2 - Biatrial enlargement.     3 - No wall motion abnormalities.     4 - Quantitatively measured LV function is 67%.     5 - Indeterminate LV diastolic function.     6 - Normal right ventricular systolic function .     7 - The estimated PA systolic pressure is greater than 24 mmHg.     8 - Mild tricuspid regurgitation.     03/15/2019  · Low normal left ventricular systolic function. The estimated ejection fraction is 50%  · Concentric left ventricular remodeling.  · Septal wall has abnormal motion.  · RV was not well visualized  · Mild tricuspid regurgitation.  · Atrial fibrillation observed.     Lipids:  Recent Labs   Lab 02/07/20  0925   LDL Cholesterol 58.0 L   HDL 27 L   Cholesterol 110 L      Holter 08/01/2018  PREDOMINANT RHYTHM  1. Sinus rhythm with heart rates varying between 44 and 82 bpm with an average of 55 bpm.     VENTRICULAR ARRHYTHMIAS  1. There were very rare PVCs recorded totalling 41 and averaging less than 1 per hour.     2. There were no episodes of ventricular tachycardia.    SUPRA VENTRICULAR ARRHYTHMIAS  1. There were very rare PACs recorded totalling 15 and averaging less than 1 per hour.  There were 3 couplets.    2. There were no episodes of sustained supraventricular  tachycardia.        Assessment:     1. Paroxysmal atrial fibrillation    2. Hyperlipidemia associated with type 2 diabetes mellitus    3. Type 2 diabetes mellitus with diabetic polyneuropathy, without long-term current use of insulin    4. Fatty liver    5. Gastroesophageal reflux disease, esophagitis presence not specified        Plan:   1. Paroxysmal atrial fibrillation  Appears to be well controlled, okay to dose p.r.n., but instructed to take metoprolol prior to flecainide with history of atrial flutter.  Continue Eliquis no evidence of bleeding.  - flecainide (TAMBOCOR) 100 MG Tab; Take 1 tablet (100 mg total) by mouth every 12 (twelve) hours as needed.  Dispense: 180 tablet; Refill: 3  - metoprolol tartrate (LOPRESSOR) 25 MG tablet; Take 0.5 tablets (12.5 mg total) by mouth 2 (two) times daily as needed.  Dispense: 90 tablet; Refill: 3    2. Hyperlipidemia associated with type 2 diabetes mellitus  LDL down nicely into the 50s continue Crestor    3. Type 2 diabetes mellitus with diabetic polyneuropathy, without long-term current use of insulin  A1c down nicely into the 6% range    4. Fatty liver  AST ALT within normal limits, continue Crestor.    5. Gastroesophageal reflux disease, esophagitis presence not specified  Continue Protonix okay to take extra in the evening if needed.    Follow up in about 1 year (around 7/14/2021).

## 2020-07-20 DIAGNOSIS — E11.42 TYPE 2 DIABETES MELLITUS WITH DIABETIC POLYNEUROPATHY, WITHOUT LONG-TERM CURRENT USE OF INSULIN: Primary | ICD-10-CM

## 2020-07-20 RX ORDER — METFORMIN HYDROCHLORIDE 500 MG/1
1000 TABLET, EXTENDED RELEASE ORAL 2 TIMES DAILY WITH MEALS
Qty: 360 TABLET | Refills: 3 | Status: SHIPPED | OUTPATIENT
Start: 2020-07-20 | End: 2020-07-29

## 2020-07-20 NOTE — PROGRESS NOTES
Called patient. He reports that Trulicity is $200 monthly and his BGs are now in the 130-200's. Reports that when he was on Januvia and Metformin, his blood sugars were in the 130's. He states that he does not want to go back on metformin due to the cancer recall. Discussed that he can go to pharmacy to ensure he is not getting Metformin that was on the recall list. He is agreeable to restarting Metformin and Januvia. All questions were answered.

## 2020-07-22 ENCOUNTER — TELEPHONE (OUTPATIENT)
Dept: ENDOCRINOLOGY | Facility: CLINIC | Age: 65
End: 2020-07-22

## 2020-07-22 NOTE — TELEPHONE ENCOUNTER
Called patient to discuss metformin. He reports he restarted metformin yesterday and had upset stomach last night but took 4 pills yesterday. He would like to try another medication. Discussed other medications for diabetes may come with side effects of weight gain and hypoglycemia. Discussed we will titrate him on the metformin. He is to take one pill daily week 1 and increase by one pill a week until he gets to 4 pills daily. He verbalized understanding. All questioned were answered.

## 2020-08-30 ENCOUNTER — PATIENT OUTREACH (OUTPATIENT)
Dept: ADMINISTRATIVE | Facility: OTHER | Age: 65
End: 2020-08-30

## 2020-08-30 NOTE — PROGRESS NOTES
Care Everywhere: updated  Immunization: updated  Health Maintenance: updated  Media Review: review for outside eye exam and colon cancer report  Legacy Review:   Order placed:   Upcoming appts:

## 2020-09-02 ENCOUNTER — TELEPHONE (OUTPATIENT)
Dept: ENDOCRINOLOGY | Facility: CLINIC | Age: 65
End: 2020-09-02

## 2020-09-28 ENCOUNTER — TELEPHONE (OUTPATIENT)
Dept: INTERNAL MEDICINE | Facility: CLINIC | Age: 65
End: 2020-09-28

## 2020-09-28 NOTE — TELEPHONE ENCOUNTER
----- Message from Dileep Quiñones sent at 9/28/2020 11:44 AM CDT -----  Regarding: appt  Contact: Self  Type:  Needs Medical Advice    Who Called: Self    Would the patient rather a call back or a response via [facility]? Call back     Best Call Back Number: [phone]    Additional Information: Self [phone]----calling to get a sooner appt. The pt has a lump on his back and also would like to spk with the provider about some other concerns. The pt is requesting a call back

## 2020-09-29 ENCOUNTER — OFFICE VISIT (OUTPATIENT)
Dept: INTERNAL MEDICINE | Facility: CLINIC | Age: 65
End: 2020-09-29
Payer: MEDICARE

## 2020-09-29 ENCOUNTER — IMMUNIZATION (OUTPATIENT)
Dept: INTERNAL MEDICINE | Facility: CLINIC | Age: 65
End: 2020-09-29
Payer: MEDICARE

## 2020-09-29 ENCOUNTER — LAB VISIT (OUTPATIENT)
Dept: LAB | Facility: HOSPITAL | Age: 65
End: 2020-09-29
Attending: INTERNAL MEDICINE
Payer: MEDICARE

## 2020-09-29 ENCOUNTER — PATIENT MESSAGE (OUTPATIENT)
Dept: OTHER | Facility: OTHER | Age: 65
End: 2020-09-29

## 2020-09-29 VITALS
HEART RATE: 67 BPM | OXYGEN SATURATION: 97 % | DIASTOLIC BLOOD PRESSURE: 62 MMHG | SYSTOLIC BLOOD PRESSURE: 112 MMHG | HEIGHT: 66 IN | BODY MASS INDEX: 30.58 KG/M2 | TEMPERATURE: 98 F | WEIGHT: 190.25 LBS | RESPIRATION RATE: 18 BRPM

## 2020-09-29 DIAGNOSIS — E11.9 TYPE 2 DIABETES MELLITUS WITHOUT COMPLICATION, WITH LONG-TERM CURRENT USE OF INSULIN: ICD-10-CM

## 2020-09-29 DIAGNOSIS — E11.69 HYPERLIPIDEMIA ASSOCIATED WITH TYPE 2 DIABETES MELLITUS: ICD-10-CM

## 2020-09-29 DIAGNOSIS — Z79.4 TYPE 2 DIABETES MELLITUS WITHOUT COMPLICATION, WITH LONG-TERM CURRENT USE OF INSULIN: ICD-10-CM

## 2020-09-29 DIAGNOSIS — Z20.822 SUSPECTED 2019 NOVEL CORONAVIRUS INFECTION: ICD-10-CM

## 2020-09-29 DIAGNOSIS — I48.91 ATRIAL FIBRILLATION, UNSPECIFIED TYPE: Primary | ICD-10-CM

## 2020-09-29 DIAGNOSIS — F41.9 ANXIETY: ICD-10-CM

## 2020-09-29 DIAGNOSIS — E78.5 HYPERLIPIDEMIA ASSOCIATED WITH TYPE 2 DIABETES MELLITUS: ICD-10-CM

## 2020-09-29 LAB
ALBUMIN SERPL BCP-MCNC: 4.4 G/DL (ref 3.5–5.2)
ALP SERPL-CCNC: 71 U/L (ref 55–135)
ALT SERPL W/O P-5'-P-CCNC: 17 U/L (ref 10–44)
ANION GAP SERPL CALC-SCNC: 10 MMOL/L (ref 8–16)
AST SERPL-CCNC: 23 U/L (ref 10–40)
BILIRUB SERPL-MCNC: 0.5 MG/DL (ref 0.1–1)
BUN SERPL-MCNC: 8 MG/DL (ref 8–23)
CALCIUM SERPL-MCNC: 10.4 MG/DL (ref 8.7–10.5)
CHLORIDE SERPL-SCNC: 98 MMOL/L (ref 95–110)
CHOLEST SERPL-MCNC: 113 MG/DL (ref 120–199)
CHOLEST/HDLC SERPL: 4 {RATIO} (ref 2–5)
CO2 SERPL-SCNC: 31 MMOL/L (ref 23–29)
CREAT SERPL-MCNC: 0.9 MG/DL (ref 0.5–1.4)
EST. GFR  (AFRICAN AMERICAN): >60 ML/MIN/1.73 M^2
EST. GFR  (NON AFRICAN AMERICAN): >60 ML/MIN/1.73 M^2
ESTIMATED AVG GLUCOSE: 169 MG/DL (ref 68–131)
GLUCOSE SERPL-MCNC: 152 MG/DL (ref 70–110)
HBA1C MFR BLD HPLC: 7.5 % (ref 4–5.6)
HDLC SERPL-MCNC: 28 MG/DL (ref 40–75)
HDLC SERPL: 24.8 % (ref 20–50)
LDLC SERPL CALC-MCNC: 50.8 MG/DL (ref 63–159)
NONHDLC SERPL-MCNC: 85 MG/DL
POTASSIUM SERPL-SCNC: 4.4 MMOL/L (ref 3.5–5.1)
PROT SERPL-MCNC: 7.6 G/DL (ref 6–8.4)
SODIUM SERPL-SCNC: 139 MMOL/L (ref 136–145)
TRIGL SERPL-MCNC: 171 MG/DL (ref 30–150)

## 2020-09-29 PROCEDURE — 3074F SYST BP LT 130 MM HG: CPT | Mod: HCNC,CPTII,S$GLB, | Performed by: INTERNAL MEDICINE

## 2020-09-29 PROCEDURE — 3078F DIAST BP <80 MM HG: CPT | Mod: HCNC,CPTII,S$GLB, | Performed by: INTERNAL MEDICINE

## 2020-09-29 PROCEDURE — 1126F AMNT PAIN NOTED NONE PRSNT: CPT | Mod: HCNC,S$GLB,, | Performed by: INTERNAL MEDICINE

## 2020-09-29 PROCEDURE — 3074F PR MOST RECENT SYSTOLIC BLOOD PRESSURE < 130 MM HG: ICD-10-PCS | Mod: HCNC,CPTII,S$GLB, | Performed by: INTERNAL MEDICINE

## 2020-09-29 PROCEDURE — 99999 PR PBB SHADOW E&M-EST. PATIENT-LVL V: ICD-10-PCS | Mod: PBBFAC,HCNC,, | Performed by: INTERNAL MEDICINE

## 2020-09-29 PROCEDURE — 1159F MED LIST DOCD IN RCRD: CPT | Mod: HCNC,S$GLB,, | Performed by: INTERNAL MEDICINE

## 2020-09-29 PROCEDURE — 80061 LIPID PANEL: CPT | Mod: HCNC

## 2020-09-29 PROCEDURE — 90694 VACC AIIV4 NO PRSRV 0.5ML IM: CPT | Mod: HCNC,S$GLB,, | Performed by: INTERNAL MEDICINE

## 2020-09-29 PROCEDURE — 83036 HEMOGLOBIN GLYCOSYLATED A1C: CPT | Mod: HCNC

## 2020-09-29 PROCEDURE — 1101F PR PT FALLS ASSESS DOC 0-1 FALLS W/OUT INJ PAST YR: ICD-10-PCS | Mod: HCNC,CPTII,S$GLB, | Performed by: INTERNAL MEDICINE

## 2020-09-29 PROCEDURE — 36415 COLL VENOUS BLD VENIPUNCTURE: CPT | Mod: HCNC

## 2020-09-29 PROCEDURE — 80053 COMPREHEN METABOLIC PANEL: CPT | Mod: HCNC

## 2020-09-29 PROCEDURE — 86769 SARS-COV-2 COVID-19 ANTIBODY: CPT | Mod: HCNC

## 2020-09-29 PROCEDURE — 3008F PR BODY MASS INDEX (BMI) DOCUMENTED: ICD-10-PCS | Mod: HCNC,CPTII,S$GLB, | Performed by: INTERNAL MEDICINE

## 2020-09-29 PROCEDURE — 1159F PR MEDICATION LIST DOCUMENTED IN MEDICAL RECORD: ICD-10-PCS | Mod: HCNC,S$GLB,, | Performed by: INTERNAL MEDICINE

## 2020-09-29 PROCEDURE — G0008 ADMIN INFLUENZA VIRUS VAC: HCPCS | Mod: HCNC,S$GLB,, | Performed by: INTERNAL MEDICINE

## 2020-09-29 PROCEDURE — 99999 PR PBB SHADOW E&M-EST. PATIENT-LVL V: CPT | Mod: PBBFAC,HCNC,, | Performed by: INTERNAL MEDICINE

## 2020-09-29 PROCEDURE — 93010 ELECTROCARDIOGRAM REPORT: CPT | Mod: HCNC,S$GLB,, | Performed by: INTERNAL MEDICINE

## 2020-09-29 PROCEDURE — 3051F PR MOST RECENT HEMOGLOBIN A1C LEVEL 7.0 - < 8.0%: ICD-10-PCS | Mod: HCNC,CPTII,S$GLB, | Performed by: INTERNAL MEDICINE

## 2020-09-29 PROCEDURE — 93010 EKG 12-LEAD: ICD-10-PCS | Mod: HCNC,S$GLB,, | Performed by: INTERNAL MEDICINE

## 2020-09-29 PROCEDURE — 1126F PR PAIN SEVERITY QUANTIFIED, NO PAIN PRESENT: ICD-10-PCS | Mod: HCNC,S$GLB,, | Performed by: INTERNAL MEDICINE

## 2020-09-29 PROCEDURE — 99214 OFFICE O/P EST MOD 30 MIN: CPT | Mod: 25,HCNC,S$GLB, | Performed by: INTERNAL MEDICINE

## 2020-09-29 PROCEDURE — 3078F PR MOST RECENT DIASTOLIC BLOOD PRESSURE < 80 MM HG: ICD-10-PCS | Mod: HCNC,CPTII,S$GLB, | Performed by: INTERNAL MEDICINE

## 2020-09-29 PROCEDURE — 3008F BODY MASS INDEX DOCD: CPT | Mod: HCNC,CPTII,S$GLB, | Performed by: INTERNAL MEDICINE

## 2020-09-29 PROCEDURE — 93005 ELECTROCARDIOGRAM TRACING: CPT | Mod: HCNC,S$GLB,, | Performed by: INTERNAL MEDICINE

## 2020-09-29 PROCEDURE — 90694 FLU VACCINE - QUADRIVALENT - ADJUVANTED: ICD-10-PCS | Mod: HCNC,S$GLB,, | Performed by: INTERNAL MEDICINE

## 2020-09-29 PROCEDURE — 99214 PR OFFICE/OUTPT VISIT, EST, LEVL IV, 30-39 MIN: ICD-10-PCS | Mod: 25,HCNC,S$GLB, | Performed by: INTERNAL MEDICINE

## 2020-09-29 PROCEDURE — G0008 FLU VACCINE - QUADRIVALENT - ADJUVANTED: ICD-10-PCS | Mod: HCNC,S$GLB,, | Performed by: INTERNAL MEDICINE

## 2020-09-29 PROCEDURE — 93005 EKG 12-LEAD: ICD-10-PCS | Mod: HCNC,S$GLB,, | Performed by: INTERNAL MEDICINE

## 2020-09-29 PROCEDURE — 3051F HG A1C>EQUAL 7.0%<8.0%: CPT | Mod: HCNC,CPTII,S$GLB, | Performed by: INTERNAL MEDICINE

## 2020-09-29 PROCEDURE — 1101F PT FALLS ASSESS-DOCD LE1/YR: CPT | Mod: HCNC,CPTII,S$GLB, | Performed by: INTERNAL MEDICINE

## 2020-09-29 RX ORDER — SERTRALINE HYDROCHLORIDE 25 MG/1
25 TABLET, FILM COATED ORAL DAILY
Qty: 30 TABLET | Refills: 11 | Status: SHIPPED | OUTPATIENT
Start: 2020-09-29 | End: 2020-10-27

## 2020-09-30 LAB — SARS-COV-2 IGG SERPLBLD QL IA.RAPID: NEGATIVE

## 2020-09-30 NOTE — PROGRESS NOTES
CC:  Lump on    HPI:  Patient is a 65-year-old male AFib, history of alcohol abuse currently alcohol-free, gout non insulin-dependent diabetes fatty liver, hyperlipidemia and history of renal cell cancer status partial nephrectomy presents today for evaluation a his back.  This is not new.  It was noticed massage therapist who is working on his neck.  Patient does have a history of atrial fibrillation.  He has metoprolol and flecainide, both of which are taken on a p.r.n. basis.  He reports this can be triggered by ice tea or feet drinks too many O'Doul's.  He rarely ever takes if flecainide or metoprolol.  In regards to his diabetes, his last A1c was in May of this year.  He checks his blood sugars 4 to 5 times a day.  He is also requesting a lipid panel.    ROS:  Patient reports otherwise feeling well.  He is very concerned about COVID-19.  As result, he bought a motor home safe would not have to stay in a hotel when he travels.  He is not drinking.  He does report having flu type symptoms 3-4 weeks ago where he felt bad.  He also reports during this time.  Losing his sense of smell and taste.    Physical exam:  General appearance:  No acute distress  HEENT:  Trachea is midline without JVD  Pulmonary:  Good inspiratory, expiratory breath sounds are heard.  Lungs are clear to auscultation.  Cardiovascular:  S1-S2, rhythm is normal.  Extremities without edema.  GI:  Abdomen was nontender, nondistended without hepatosplenomegaly  Derm:  Patient's back was inspected.  The area in question is in the left upper back over the left scapula.  Appears to be a lipoma  Comments:  Did spend about 15 min discussed with patient about Coronavirus.  I would like to get a antibody test today.  Did discuss SSRIs with the patient.  He does not feel he needs to take anything to keep his anxiety under control.    Assessment:  1.  Lipoma versus sebaceous cyst  2.  Atrial fibrillation  3.  Non its dependent diabetes  4.   Hyperlipidemia  5.  Anxiety concerning COVID-19    Plan:  1.  Will get a EKG in clinic care  2.  Will check a CMP, A1c, lipid panel  3.  Will see about scheduling an ultrasound of the lesion in question.

## 2020-10-01 ENCOUNTER — PATIENT MESSAGE (OUTPATIENT)
Dept: INTERNAL MEDICINE | Facility: CLINIC | Age: 65
End: 2020-10-01

## 2020-10-01 ENCOUNTER — TELEPHONE (OUTPATIENT)
Dept: INTERNAL MEDICINE | Facility: CLINIC | Age: 65
End: 2020-10-01

## 2020-10-01 NOTE — TELEPHONE ENCOUNTER
----- Message from Bacilio Larry MD sent at 9/30/2020  5:33 PM CDT -----  Please call the patient regarding his abnormal result.His blood tests showed that his blood sugars have been averaging around 169 overall. He needs to watch his diet more closely. His Covid test was negative.

## 2020-10-03 ENCOUNTER — PATIENT MESSAGE (OUTPATIENT)
Dept: INTERNAL MEDICINE | Facility: CLINIC | Age: 65
End: 2020-10-03

## 2020-10-05 ENCOUNTER — PATIENT MESSAGE (OUTPATIENT)
Dept: ADMINISTRATIVE | Facility: HOSPITAL | Age: 65
End: 2020-10-05

## 2020-10-27 ENCOUNTER — HOSPITAL ENCOUNTER (OUTPATIENT)
Dept: RADIOLOGY | Facility: HOSPITAL | Age: 65
Discharge: HOME | End: 2020-10-27
Attending: INTERNAL MEDICINE
Payer: MEDICARE

## 2020-10-27 ENCOUNTER — TELEPHONE (OUTPATIENT)
Dept: INTERNAL MEDICINE | Facility: CLINIC | Age: 65
End: 2020-10-27

## 2020-10-27 ENCOUNTER — OFFICE VISIT (OUTPATIENT)
Dept: INTERNAL MEDICINE | Facility: CLINIC | Age: 65
End: 2020-10-27
Payer: MEDICARE

## 2020-10-27 VITALS
SYSTOLIC BLOOD PRESSURE: 122 MMHG | OXYGEN SATURATION: 95 % | HEART RATE: 68 BPM | BODY MASS INDEX: 30.79 KG/M2 | HEIGHT: 66 IN | DIASTOLIC BLOOD PRESSURE: 74 MMHG | WEIGHT: 191.56 LBS

## 2020-10-27 DIAGNOSIS — R22.2 MASS OF SUBCUTANEOUS TISSUE OF BACK: ICD-10-CM

## 2020-10-27 DIAGNOSIS — I48.91 ATRIAL FIBRILLATION, UNSPECIFIED TYPE: Primary | ICD-10-CM

## 2020-10-27 PROCEDURE — 99214 OFFICE O/P EST MOD 30 MIN: CPT | Mod: HCNC,S$GLB,, | Performed by: INTERNAL MEDICINE

## 2020-10-27 PROCEDURE — 1159F PR MEDICATION LIST DOCUMENTED IN MEDICAL RECORD: ICD-10-PCS | Mod: HCNC,S$GLB,, | Performed by: INTERNAL MEDICINE

## 2020-10-27 PROCEDURE — 3074F SYST BP LT 130 MM HG: CPT | Mod: HCNC,CPTII,S$GLB, | Performed by: INTERNAL MEDICINE

## 2020-10-27 PROCEDURE — 3074F PR MOST RECENT SYSTOLIC BLOOD PRESSURE < 130 MM HG: ICD-10-PCS | Mod: HCNC,CPTII,S$GLB, | Performed by: INTERNAL MEDICINE

## 2020-10-27 PROCEDURE — 1159F MED LIST DOCD IN RCRD: CPT | Mod: HCNC,S$GLB,, | Performed by: INTERNAL MEDICINE

## 2020-10-27 PROCEDURE — 93005 EKG 12-LEAD: ICD-10-PCS | Mod: HCNC,S$GLB,, | Performed by: INTERNAL MEDICINE

## 2020-10-27 PROCEDURE — 93010 ELECTROCARDIOGRAM REPORT: CPT | Mod: HCNC,S$GLB,, | Performed by: INTERNAL MEDICINE

## 2020-10-27 PROCEDURE — 99214 PR OFFICE/OUTPT VISIT, EST, LEVL IV, 30-39 MIN: ICD-10-PCS | Mod: HCNC,S$GLB,, | Performed by: INTERNAL MEDICINE

## 2020-10-27 PROCEDURE — 1101F PT FALLS ASSESS-DOCD LE1/YR: CPT | Mod: HCNC,CPTII,S$GLB, | Performed by: INTERNAL MEDICINE

## 2020-10-27 PROCEDURE — 3078F PR MOST RECENT DIASTOLIC BLOOD PRESSURE < 80 MM HG: ICD-10-PCS | Mod: HCNC,CPTII,S$GLB, | Performed by: INTERNAL MEDICINE

## 2020-10-27 PROCEDURE — 76604 US SOFT TISSUE CHEST_UPPER BACK: ICD-10-PCS | Mod: 26,HCNC,, | Performed by: RADIOLOGY

## 2020-10-27 PROCEDURE — 3008F BODY MASS INDEX DOCD: CPT | Mod: HCNC,CPTII,S$GLB, | Performed by: INTERNAL MEDICINE

## 2020-10-27 PROCEDURE — 99499 UNLISTED E&M SERVICE: CPT | Mod: S$GLB,,, | Performed by: INTERNAL MEDICINE

## 2020-10-27 PROCEDURE — 76604 US EXAM CHEST: CPT | Mod: TC,HCNC

## 2020-10-27 PROCEDURE — 3078F DIAST BP <80 MM HG: CPT | Mod: HCNC,CPTII,S$GLB, | Performed by: INTERNAL MEDICINE

## 2020-10-27 PROCEDURE — 99999 PR PBB SHADOW E&M-EST. PATIENT-LVL V: ICD-10-PCS | Mod: PBBFAC,HCNC,, | Performed by: INTERNAL MEDICINE

## 2020-10-27 PROCEDURE — 93010 EKG 12-LEAD: ICD-10-PCS | Mod: HCNC,S$GLB,, | Performed by: INTERNAL MEDICINE

## 2020-10-27 PROCEDURE — 99999 PR PBB SHADOW E&M-EST. PATIENT-LVL V: CPT | Mod: PBBFAC,HCNC,, | Performed by: INTERNAL MEDICINE

## 2020-10-27 PROCEDURE — 99499 RISK ADDL DX/OHS AUDIT: ICD-10-PCS | Mod: S$GLB,,, | Performed by: INTERNAL MEDICINE

## 2020-10-27 PROCEDURE — 1126F AMNT PAIN NOTED NONE PRSNT: CPT | Mod: HCNC,S$GLB,, | Performed by: INTERNAL MEDICINE

## 2020-10-27 PROCEDURE — 1126F PR PAIN SEVERITY QUANTIFIED, NO PAIN PRESENT: ICD-10-PCS | Mod: HCNC,S$GLB,, | Performed by: INTERNAL MEDICINE

## 2020-10-27 PROCEDURE — 3008F PR BODY MASS INDEX (BMI) DOCUMENTED: ICD-10-PCS | Mod: HCNC,CPTII,S$GLB, | Performed by: INTERNAL MEDICINE

## 2020-10-27 PROCEDURE — 1101F PR PT FALLS ASSESS DOC 0-1 FALLS W/OUT INJ PAST YR: ICD-10-PCS | Mod: HCNC,CPTII,S$GLB, | Performed by: INTERNAL MEDICINE

## 2020-10-27 PROCEDURE — 76604 US EXAM CHEST: CPT | Mod: 26,HCNC,, | Performed by: RADIOLOGY

## 2020-10-27 PROCEDURE — 93005 ELECTROCARDIOGRAM TRACING: CPT | Mod: HCNC,S$GLB,, | Performed by: INTERNAL MEDICINE

## 2020-10-27 NOTE — TELEPHONE ENCOUNTER
----- Message from Bacilio Larry MD sent at 10/27/2020  4:27 PM CDT -----  Please call the patient regarding his abnormal result. Notify that his ultrasound showed that the lump on his back is a lipoma. Nothing needs to be done about it.

## 2020-10-28 ENCOUNTER — PATIENT MESSAGE (OUTPATIENT)
Dept: INTERNAL MEDICINE | Facility: CLINIC | Age: 65
End: 2020-10-28

## 2020-10-28 ENCOUNTER — TELEPHONE (OUTPATIENT)
Dept: INTERNAL MEDICINE | Facility: CLINIC | Age: 65
End: 2020-10-28

## 2020-10-28 NOTE — PROGRESS NOTES
CC:  2 week follow-up    HPI:  The patient is a 65-year-old male with atrial fibrillation, anxiety, gout, diabetes, fatty liver, hyperlipidemia and renal cell cancer who presents today for follow-up.  The patient reports that the Zoloft cost him to develop atrial fibrillation.  He states he took 3 doses.  Each time he took it, he had palpitations.  He also reports that even green tea cause him to have palpitations.  He restarted metoprolol and flecainide.  He was taking both of these on a p.r.n. basis.  He restarted both as of 3 days ago.    ROS:  Patient reports currently feeling well.  He is still having problems with anxiety.  He would like something for this.    Physical exam:  General appearance:  No acute distress  Pulmonary:  Good inspiratory, expiratory breath sounds are heard.  Lungs are clear to auscultation.  Cardiovascular:  S1-S2, rhythm appears to be normal.  Extremities without edema.  GI:  Abdomen is nontender, nondistended without hepatosplenomegaly  Derm:  Patient still has that lesion in the right upper back which looks like a lipoma    Assessment:  1.  AFib  2.  Anxiety  3.  Lipoma    Plan:  1.  Will research anxiety medications  2.  The patient is to stay on metoprolol and flecainide for now  3.  Will order an ultrasound of the skin lesion.

## 2020-11-16 ENCOUNTER — TELEPHONE (OUTPATIENT)
Dept: INTERNAL MEDICINE | Facility: CLINIC | Age: 65
End: 2020-11-16

## 2020-11-16 NOTE — TELEPHONE ENCOUNTER
----- Message from Balta Hernandez sent at 11/16/2020  9:46 AM CST -----  Regarding: self   Patient called in regards to medication for anxiety Gabapentin 300mg patient states Dr Larry was to call in something for him other then that medication please advise

## 2020-11-16 NOTE — TELEPHONE ENCOUNTER
----- Message from Lenora Johnson sent at 11/16/2020  1:28 PM CST -----  Contact: Self [phone]  Would like to receive medical advice.    Pharmacy name/number (copy/paste from chart):  [facility] [phone]    Would they like a call back or a response via [facility]:  call back    Additional information: Calling to speak with the nurse regarding if the provider replaced gabapentin 300mg. I was not able to fid medication in chart hx but pt states the provider did prescribe the medication. Patient also states the provider would re place the medication due to hair loss. Patient is requesting a call back regarding message.

## 2020-11-19 ENCOUNTER — TELEPHONE (OUTPATIENT)
Dept: INTERNAL MEDICINE | Facility: CLINIC | Age: 65
End: 2020-11-19

## 2020-11-19 NOTE — TELEPHONE ENCOUNTER
----- Message from Valerie Garcia sent at 11/19/2020  8:41 AM CST -----  Contact: self [phone]  Pt is doing a f/u call on message sent on Monday in ref to him getting a refill on the gabapentin (NEURONTIN) 300 MG capsule . Pt would like to speak to someone to know if the dr will refill this medication on not. Please call and advise. Thank you

## 2020-11-20 ENCOUNTER — OFFICE VISIT (OUTPATIENT)
Dept: PRIMARY CARE CLINIC | Facility: CLINIC | Age: 65
End: 2020-11-20
Payer: MEDICARE

## 2020-11-20 DIAGNOSIS — F41.9 ANXIETY: Primary | ICD-10-CM

## 2020-11-20 PROCEDURE — 99442 PR PHYSICIAN TELEPHONE EVALUATION 11-20 MIN: ICD-10-PCS | Mod: HCNC,95,, | Performed by: FAMILY MEDICINE

## 2020-11-20 PROCEDURE — 99442 PR PHYSICIAN TELEPHONE EVALUATION 11-20 MIN: CPT | Mod: HCNC,95,, | Performed by: FAMILY MEDICINE

## 2020-11-20 PROCEDURE — 1159F MED LIST DOCD IN RCRD: CPT | Mod: HCNC,95,, | Performed by: FAMILY MEDICINE

## 2020-11-20 PROCEDURE — 1159F PR MEDICATION LIST DOCUMENTED IN MEDICAL RECORD: ICD-10-PCS | Mod: HCNC,95,, | Performed by: FAMILY MEDICINE

## 2020-11-20 RX ORDER — GABAPENTIN 300 MG/1
300 CAPSULE ORAL 3 TIMES DAILY
Qty: 60 CAPSULE | Refills: 1 | Status: SHIPPED | OUTPATIENT
Start: 2020-11-20 | End: 2021-04-08

## 2020-11-20 NOTE — PROGRESS NOTES
Attempted to call pt at given # at 10:45am no answer after 5 rings will attempt again in a few minutes.    Established Patient - Audio Only Telehealth Visit     The patient location is: home/[place]  The chief complaint leading to consultation is: needs a med  Refill of his gabapentin  Visit type: Virtual visit with audio only (telephone)  Total time spent with patient:  15 minutes  The reason for the audio only service rather than synchronous audio and video virtual visit was related to technical difficulties or patient preference/necessity.     Each patient to whom I provide medical services by telemedicine is:  (1) informed of the relationship between the physician and patient and the respective role of any other health care provider with respect to management of the patient; and (2) notified that they may decline to receive medical services by telemedicine and may withdraw from such care at any time. Patient verbally consented to receive this service via voice-only telephone call.       HPI: pt has difficulties sleeping for several years  He needs refills of his gabapentin.     Assessment and plan:     Diagnoses and all orders for this visit:    Anxiety  -     gabapentin (NEURONTIN) 300 MG capsule; Take 1 capsule (300 mg total) by mouth 3 (three) times daily.     Pt will f/u with his pcp for further refills  This service was not originating from a related E/M service provided within the previous 7 days nor will  to an E/M service or procedure within the next 24 hours or my soonest available appointment.  Prevailing standard of care was able to be met in this audio-only visit.

## 2020-11-20 NOTE — TELEPHONE ENCOUNTER
----- Message from Catina García sent at 11/20/2020 10:07 AM CST -----  Contact: self [phone]  Pt is calling to follow up on his Rx request for gabapentin for anxiety. Pt states this has been going on for a week and asking if he needs to get a new doctor. Pt scheduled appt for a virtual with Dr Jean.

## 2020-11-30 RX ORDER — ALLOPURINOL 100 MG/1
200 TABLET ORAL DAILY
Qty: 180 TABLET | Refills: 0 | Status: SHIPPED | OUTPATIENT
Start: 2020-11-30 | End: 2021-03-15 | Stop reason: SDUPTHER

## 2020-11-30 RX ORDER — ALLOPURINOL 100 MG/1
200 TABLET ORAL DAILY
Qty: 180 TABLET | Refills: 0 | OUTPATIENT
Start: 2020-11-30 | End: 2021-11-30

## 2020-12-02 ENCOUNTER — PATIENT OUTREACH (OUTPATIENT)
Dept: ADMINISTRATIVE | Facility: HOSPITAL | Age: 65
End: 2020-12-02

## 2020-12-10 ENCOUNTER — TELEPHONE (OUTPATIENT)
Dept: INTERNAL MEDICINE | Facility: CLINIC | Age: 65
End: 2020-12-10

## 2020-12-10 NOTE — TELEPHONE ENCOUNTER
----- Message from Trena Dorsey sent at 12/10/2020  8:22 AM CST -----  Contact: [phone] @ patient  Good Morning,  Requesting an RX refill or new RX.  Is this a refill or new RX: New  RX name and strength: trazodone 50 mg  Is this a 30 day or 90 day RX:   Pharmacy name and phone # [facility] #[id number] - [place] - [address]  AT [facility] [address] [facility] [address]  Comments: Patient would like to be removed from gabapentin (NEURONTIN) 300 MG capsule

## 2020-12-10 NOTE — TELEPHONE ENCOUNTER
Pt is requesting you discontinue his Gabapentin and start him on Trazodone.    Please advise,  Thank You.

## 2020-12-14 ENCOUNTER — TELEPHONE (OUTPATIENT)
Dept: INTERNAL MEDICINE | Facility: CLINIC | Age: 65
End: 2020-12-14

## 2020-12-14 NOTE — TELEPHONE ENCOUNTER
----- Message from Dileep Quiñones sent at 12/14/2020  9:14 AM CST -----  Regarding: Medication  Contact: Self  Would like to get medical advice.    Symptoms (please be specific):  N/A    How long has patient had these symptoms:  N/A    Pharmacy name and phone # (copy from chart):      [facility] #[id number] - [place]  [address]  AT [facility] [address]  [address] [place] [id number]  Phone: [phone] Fax: [phone]       Comments:  Self [phone]----calling to spk with the nurse regarding the pt medication. The pt is requesting a call back. The pt also would like to know about the Covid vaccine as well.

## 2020-12-14 NOTE — TELEPHONE ENCOUNTER
Message left on his recorder to return call with the medication he is calling about.  Covid-19 vaccine is being given to health cares works first and then Nursing Home patients.

## 2020-12-14 NOTE — TELEPHONE ENCOUNTER
He states that he has called several times and has not heard back. He is asking to have Trazodone 50 mg re-prescribed 1-2 tablets po at hs.

## 2020-12-15 ENCOUNTER — TELEPHONE (OUTPATIENT)
Dept: INTERNAL MEDICINE | Facility: CLINIC | Age: 65
End: 2020-12-15

## 2020-12-15 RX ORDER — TRAZODONE HYDROCHLORIDE 50 MG/1
100 TABLET ORAL NIGHTLY PRN
Qty: 60 TABLET | Refills: 11 | Status: SHIPPED | OUTPATIENT
Start: 2020-12-15 | End: 2022-01-04

## 2020-12-15 NOTE — TELEPHONE ENCOUNTER
----- Message from Lupe Armas sent at 12/15/2020  8:09 AM CST -----  Regarding: Refill Request for Trazodone 50 mg  Contact: [phone]  Requesting an RX refill or new RX.RX Refill   Is this a refill or new RX: Refill   RX name and strength: Trazodone 50 mg   Is this a 30 day or 90 day RX: 30 day   Pharmacy name and phone # (copy/paste from chart):  [facility][id number] - [place], [place] - [address] AT [facility] OF [address] [phone] (Phone)  [phone] (Fax)      Comments: Patient called today and said that he needs a refill on his Trazodone 50 mg that he takes for anxiety. However, I did not see it on his list of medications.

## 2020-12-15 NOTE — TELEPHONE ENCOUNTER
Pt requesting refill of Trazodone 50 mg take 1 by mouth but this is not on current medication list. This medication was discontinued by:    Discontinued By (on 10/07/2019 1834) Malcolm Mendieta -[phone] -- [email or web address]   This Order Has Been Discontinued    Order Status Reason By On   Discontinued None Malcolm Mendieta MD 10/7/19 1834     Pt stating he need a refill for his anxiety.    Please advise,  Thank You.

## 2020-12-23 DIAGNOSIS — E11.9 TYPE 2 DIABETES MELLITUS WITHOUT COMPLICATION: ICD-10-CM

## 2020-12-28 ENCOUNTER — TELEPHONE (OUTPATIENT)
Dept: INTERNAL MEDICINE | Facility: CLINIC | Age: 65
End: 2020-12-28

## 2020-12-28 NOTE — TELEPHONE ENCOUNTER
----- Message from Marcelle Lee sent at 12/28/2020  4:09 PM CST -----  Contact: [phone]  Liam called, requested a courtesy call from pcp in regards the covid 19 shot. Patient stated that he was told by pcp to keep checking on it. Thank you

## 2020-12-29 ENCOUNTER — TELEPHONE (OUTPATIENT)
Dept: INTERNAL MEDICINE | Facility: CLINIC | Age: 65
End: 2020-12-29

## 2020-12-29 NOTE — TELEPHONE ENCOUNTER
Pt get this rx through [facility] and a msg was sent informing and to confirm if pt want to discontinue mail or and have sent to local pharmacy.

## 2020-12-29 NOTE — TELEPHONE ENCOUNTER
----- Message from Michelle Silva sent at 12/29/2020  9:41 AM CST -----  Contact: pt [phone]  Requesting an RX refill or new RX.  Is this a refill or new RX: refill  RX name and strength: pantoprazole (PROTONIX) 40 MG   Is this a 30 day or 90 day RX: 90  Pharmacy name and phone # (copy/paste from chart):    [id number] - [place] [address] [facility] [address]  AT [facility] [address]  [address] [place] [address]  Phone: [phone] Fax: [phone]      Comments:

## 2021-01-04 ENCOUNTER — PATIENT MESSAGE (OUTPATIENT)
Dept: ADMINISTRATIVE | Facility: HOSPITAL | Age: 66
End: 2021-01-04

## 2021-01-04 RX ORDER — PANTOPRAZOLE SODIUM 40 MG/1
40 TABLET, DELAYED RELEASE ORAL DAILY
Qty: 90 TABLET | Refills: 3 | Status: SHIPPED | OUTPATIENT
Start: 2021-01-04 | End: 2022-02-07

## 2021-02-10 DIAGNOSIS — E11.9 TYPE 2 DIABETES MELLITUS WITHOUT COMPLICATION, UNSPECIFIED WHETHER LONG TERM INSULIN USE: ICD-10-CM

## 2021-02-23 ENCOUNTER — PATIENT MESSAGE (OUTPATIENT)
Dept: RHEUMATOLOGY | Facility: CLINIC | Age: 66
End: 2021-02-23

## 2021-03-09 ENCOUNTER — PES CALL (OUTPATIENT)
Dept: ADMINISTRATIVE | Facility: CLINIC | Age: 66
End: 2021-03-09

## 2021-03-15 RX ORDER — ALLOPURINOL 100 MG/1
200 TABLET ORAL DAILY
Qty: 180 TABLET | Refills: 0 | Status: SHIPPED | OUTPATIENT
Start: 2021-03-15 | End: 2021-06-28 | Stop reason: SDUPTHER

## 2021-04-05 ENCOUNTER — PATIENT MESSAGE (OUTPATIENT)
Dept: ADMINISTRATIVE | Facility: HOSPITAL | Age: 66
End: 2021-04-05

## 2021-04-07 ENCOUNTER — TELEPHONE (OUTPATIENT)
Dept: ADMINISTRATIVE | Facility: CLINIC | Age: 66
End: 2021-04-07

## 2021-04-08 ENCOUNTER — OFFICE VISIT (OUTPATIENT)
Dept: INTERNAL MEDICINE | Facility: CLINIC | Age: 66
End: 2021-04-08
Payer: MEDICARE

## 2021-04-08 VITALS
DIASTOLIC BLOOD PRESSURE: 60 MMHG | WEIGHT: 172.81 LBS | BODY MASS INDEX: 27.77 KG/M2 | HEART RATE: 64 BPM | HEIGHT: 66 IN | SYSTOLIC BLOOD PRESSURE: 108 MMHG

## 2021-04-08 DIAGNOSIS — M1A.09X0 IDIOPATHIC CHRONIC GOUT OF MULTIPLE SITES WITHOUT TOPHUS: ICD-10-CM

## 2021-04-08 DIAGNOSIS — K76.0 FATTY LIVER: ICD-10-CM

## 2021-04-08 DIAGNOSIS — E78.5 HYPERLIPIDEMIA ASSOCIATED WITH TYPE 2 DIABETES MELLITUS: ICD-10-CM

## 2021-04-08 DIAGNOSIS — I48.3 TYPICAL ATRIAL FLUTTER: ICD-10-CM

## 2021-04-08 DIAGNOSIS — E66.3 OVERWEIGHT (BMI 25.0-29.9): ICD-10-CM

## 2021-04-08 DIAGNOSIS — K21.9 GASTROESOPHAGEAL REFLUX DISEASE, UNSPECIFIED WHETHER ESOPHAGITIS PRESENT: ICD-10-CM

## 2021-04-08 DIAGNOSIS — I70.0 AORTIC ATHEROSCLEROSIS: ICD-10-CM

## 2021-04-08 DIAGNOSIS — I48.0 PAROXYSMAL ATRIAL FIBRILLATION: ICD-10-CM

## 2021-04-08 DIAGNOSIS — Z85.528 PERSONAL HISTORY OF KIDNEY CANCER: ICD-10-CM

## 2021-04-08 DIAGNOSIS — F10.20 ALCOHOL USE DISORDER, MODERATE, DEPENDENCE: ICD-10-CM

## 2021-04-08 DIAGNOSIS — Z12.11 SCREENING FOR COLON CANCER: ICD-10-CM

## 2021-04-08 DIAGNOSIS — E11.69 HYPERLIPIDEMIA ASSOCIATED WITH TYPE 2 DIABETES MELLITUS: ICD-10-CM

## 2021-04-08 DIAGNOSIS — Z00.00 ENCOUNTER FOR PREVENTIVE HEALTH EXAMINATION: Primary | ICD-10-CM

## 2021-04-08 DIAGNOSIS — E11.42 TYPE 2 DIABETES MELLITUS WITH DIABETIC POLYNEUROPATHY, WITHOUT LONG-TERM CURRENT USE OF INSULIN: ICD-10-CM

## 2021-04-08 DIAGNOSIS — R63.4 WEIGHT LOSS, ABNORMAL: ICD-10-CM

## 2021-04-08 PROCEDURE — G0439 PR MEDICARE ANNUAL WELLNESS SUBSEQUENT VISIT: ICD-10-PCS | Mod: S$GLB,,, | Performed by: NURSE PRACTITIONER

## 2021-04-08 PROCEDURE — G0439 PPPS, SUBSEQ VISIT: HCPCS | Mod: S$GLB,,, | Performed by: NURSE PRACTITIONER

## 2021-04-08 PROCEDURE — 99999 PR PBB SHADOW E&M-EST. PATIENT-LVL V: ICD-10-PCS | Mod: PBBFAC,,, | Performed by: NURSE PRACTITIONER

## 2021-04-08 PROCEDURE — 1126F AMNT PAIN NOTED NONE PRSNT: CPT | Mod: S$GLB,,, | Performed by: NURSE PRACTITIONER

## 2021-04-08 PROCEDURE — 3008F BODY MASS INDEX DOCD: CPT | Mod: CPTII,S$GLB,, | Performed by: NURSE PRACTITIONER

## 2021-04-08 PROCEDURE — 1126F PR PAIN SEVERITY QUANTIFIED, NO PAIN PRESENT: ICD-10-PCS | Mod: S$GLB,,, | Performed by: NURSE PRACTITIONER

## 2021-04-08 PROCEDURE — 3008F PR BODY MASS INDEX (BMI) DOCUMENTED: ICD-10-PCS | Mod: CPTII,S$GLB,, | Performed by: NURSE PRACTITIONER

## 2021-04-08 PROCEDURE — 1101F PT FALLS ASSESS-DOCD LE1/YR: CPT | Mod: CPTII,S$GLB,, | Performed by: NURSE PRACTITIONER

## 2021-04-08 PROCEDURE — 3051F PR MOST RECENT HEMOGLOBIN A1C LEVEL 7.0 - < 8.0%: ICD-10-PCS | Mod: CPTII,S$GLB,, | Performed by: NURSE PRACTITIONER

## 2021-04-08 PROCEDURE — 99999 PR PBB SHADOW E&M-EST. PATIENT-LVL V: CPT | Mod: PBBFAC,,, | Performed by: NURSE PRACTITIONER

## 2021-04-08 PROCEDURE — 1158F ADVNC CARE PLAN TLK DOCD: CPT | Mod: S$GLB,,, | Performed by: NURSE PRACTITIONER

## 2021-04-08 PROCEDURE — 3288F FALL RISK ASSESSMENT DOCD: CPT | Mod: CPTII,S$GLB,, | Performed by: NURSE PRACTITIONER

## 2021-04-08 PROCEDURE — 1101F PR PT FALLS ASSESS DOC 0-1 FALLS W/OUT INJ PAST YR: ICD-10-PCS | Mod: CPTII,S$GLB,, | Performed by: NURSE PRACTITIONER

## 2021-04-08 PROCEDURE — 99499 UNLISTED E&M SERVICE: CPT | Mod: S$GLB,,, | Performed by: NURSE PRACTITIONER

## 2021-04-08 PROCEDURE — 3288F PR FALLS RISK ASSESSMENT DOCUMENTED: ICD-10-PCS | Mod: CPTII,S$GLB,, | Performed by: NURSE PRACTITIONER

## 2021-04-08 PROCEDURE — 1158F PR ADVANCE CARE PLANNING DISCUSS DOCUMENTED IN MEDICAL RECORD: ICD-10-PCS | Mod: S$GLB,,, | Performed by: NURSE PRACTITIONER

## 2021-04-08 PROCEDURE — 3051F HG A1C>EQUAL 7.0%<8.0%: CPT | Mod: CPTII,S$GLB,, | Performed by: NURSE PRACTITIONER

## 2021-04-08 PROCEDURE — 99499 RISK ADDL DX/OHS AUDIT: ICD-10-PCS | Mod: S$GLB,,, | Performed by: NURSE PRACTITIONER

## 2021-04-13 ENCOUNTER — HOSPITAL ENCOUNTER (EMERGENCY)
Facility: HOSPITAL | Age: 66
Discharge: HOME | End: 2021-04-13
Attending: EMERGENCY MEDICINE
Payer: MEDICARE

## 2021-04-13 VITALS
RESPIRATION RATE: 15 BRPM | DIASTOLIC BLOOD PRESSURE: 73 MMHG | TEMPERATURE: 98 F | WEIGHT: 173 LBS | SYSTOLIC BLOOD PRESSURE: 136 MMHG | OXYGEN SATURATION: 100 % | HEART RATE: 62 BPM | HEIGHT: 66 IN | BODY MASS INDEX: 27.8 KG/M2
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
## 2021-04-13 LAB
ALBUMIN SERPL BCP-MCNC: 4.4 G/DL (ref 3.5–5.2)
ALP SERPL-CCNC: 42 U/L (ref 55–135)
ALT SERPL W/O P-5'-P-CCNC: 25 U/L (ref 10–44)
ANION GAP SERPL CALC-SCNC: 10 MMOL/L (ref 8–16)
AST SERPL-CCNC: 48 U/L (ref 10–40)
BASOPHILS # BLD AUTO: 0.07 K/UL (ref 0–0.2)
BASOPHILS NFR BLD: 0.9 % (ref 0–1.9)
BILIRUB SERPL-MCNC: 0.5 MG/DL (ref 0.1–1)
BILIRUB UR QL STRIP: NEGATIVE
BUN SERPL-MCNC: 3 MG/DL (ref 8–23)
CALCIUM SERPL-MCNC: 8.9 MG/DL (ref 8.7–10.5)
CHLORIDE SERPL-SCNC: 92 MMOL/L (ref 95–110)
CLARITY UR REFRACT.AUTO: CLEAR
CO2 SERPL-SCNC: 26 MMOL/L (ref 23–29)
COLOR UR AUTO: ABNORMAL
CREAT SERPL-MCNC: 0.7 MG/DL (ref 0.5–1.4)
DIFFERENTIAL METHOD: ABNORMAL
EOSINOPHIL # BLD AUTO: 0.1 K/UL (ref 0–0.5)
EOSINOPHIL NFR BLD: 1.4 % (ref 0–8)
ERYTHROCYTE [DISTWIDTH] IN BLOOD BY AUTOMATED COUNT: 16.6 % (ref 11.5–14.5)
EST. GFR  (AFRICAN AMERICAN): >60 ML/MIN/1.73 M^2
EST. GFR  (NON AFRICAN AMERICAN): >60 ML/MIN/1.73 M^2
GLUCOSE SERPL-MCNC: 102 MG/DL (ref 70–110)
GLUCOSE UR QL STRIP: NEGATIVE
HCT VFR BLD AUTO: 43 % (ref 40–54)
HGB BLD-MCNC: 15.1 G/DL (ref 14–18)
HGB UR QL STRIP: NEGATIVE
IMM GRANULOCYTES # BLD AUTO: 0.04 K/UL (ref 0–0.04)
IMM GRANULOCYTES NFR BLD AUTO: 0.5 % (ref 0–0.5)
KETONES UR QL STRIP: NEGATIVE
LEUKOCYTE ESTERASE UR QL STRIP: NEGATIVE
LYMPHOCYTES # BLD AUTO: 3.2 K/UL (ref 1–4.8)
LYMPHOCYTES NFR BLD: 42 % (ref 18–48)
MCH RBC QN AUTO: 31.8 PG (ref 27–31)
MCHC RBC AUTO-ENTMCNC: 35.1 G/DL (ref 32–36)
MCV RBC AUTO: 91 FL (ref 82–98)
MONOCYTES # BLD AUTO: 0.9 K/UL (ref 0.3–1)
MONOCYTES NFR BLD: 12.2 % (ref 4–15)
NEUTROPHILS # BLD AUTO: 3.3 K/UL (ref 1.8–7.7)
NEUTROPHILS NFR BLD: 43 % (ref 38–73)
NITRITE UR QL STRIP: NEGATIVE
NRBC BLD-RTO: 0 /100 WBC
PH UR STRIP: 6 [PH] (ref 5–8)
PLATELET # BLD AUTO: 167 K/UL (ref 150–450)
PMV BLD AUTO: 8.1 FL (ref 9.2–12.9)
POTASSIUM SERPL-SCNC: 4.4 MMOL/L (ref 3.5–5.1)
PROT SERPL-MCNC: 7.2 G/DL (ref 6–8.4)
PROT UR QL STRIP: NEGATIVE
RBC # BLD AUTO: 4.75 M/UL (ref 4.6–6.2)
SODIUM SERPL-SCNC: 128 MMOL/L (ref 136–145)
SP GR UR STRIP: 1 (ref 1–1.03)
URN SPEC COLLECT METH UR: ABNORMAL
WBC # BLD AUTO: 7.62 K/UL (ref 3.9–12.7)

## 2021-04-13 PROCEDURE — 99284 PR EMERGENCY DEPT VISIT,LEVEL IV: ICD-10-PCS | Mod: ,,, | Performed by: PHYSICIAN ASSISTANT

## 2021-04-13 PROCEDURE — 81003 URINALYSIS AUTO W/O SCOPE: CPT | Performed by: PHYSICIAN ASSISTANT

## 2021-04-13 PROCEDURE — 85025 COMPLETE CBC W/AUTO DIFF WBC: CPT | Performed by: PHYSICIAN ASSISTANT

## 2021-04-13 PROCEDURE — 90471 IMMUNIZATION ADMIN: CPT | Performed by: PHYSICIAN ASSISTANT

## 2021-04-13 PROCEDURE — 90715 TDAP VACCINE 7 YRS/> IM: CPT | Performed by: PHYSICIAN ASSISTANT

## 2021-04-13 PROCEDURE — 99285 EMERGENCY DEPT VISIT HI MDM: CPT | Mod: 25

## 2021-04-13 PROCEDURE — 63600175 PHARM REV CODE 636 W HCPCS: Performed by: PHYSICIAN ASSISTANT

## 2021-04-13 PROCEDURE — 80053 COMPREHEN METABOLIC PANEL: CPT | Performed by: PHYSICIAN ASSISTANT

## 2021-04-13 PROCEDURE — 99284 EMERGENCY DEPT VISIT MOD MDM: CPT | Mod: ,,, | Performed by: PHYSICIAN ASSISTANT

## 2021-04-13 RX ADMIN — CLOSTRIDIUM TETANI TOXOID ANTIGEN (FORMALDEHYDE INACTIVATED), CORYNEBACTERIUM DIPHTHERIAE TOXOID ANTIGEN (FORMALDEHYDE INACTIVATED), BORDETELLA PERTUSSIS TOXOID ANTIGEN (GLUTARALDEHYDE INACTIVATED), BORDETELLA PERTUSSIS FILAMENTOUS HEMAGGLUTININ ANTIGEN (FORMALDEHYDE INACTIVATED), BORDETELLA PERTUSSIS PERTACTIN ANTIGEN, AND BORDETELLA PERTUSSIS FIMBRIAE 2/3 ANTIGEN 0.5 ML: 5; 2; 2.5; 5; 3; 5 INJECTION, SUSPENSION INTRAMUSCULAR at 11:04

## 2021-04-21 ENCOUNTER — PATIENT OUTREACH (OUTPATIENT)
Dept: ADMINISTRATIVE | Facility: HOSPITAL | Age: 66
End: 2021-04-21

## 2021-04-26 ENCOUNTER — TELEPHONE (OUTPATIENT)
Dept: INTERNAL MEDICINE | Facility: CLINIC | Age: 66
End: 2021-04-26

## 2021-05-04 ENCOUNTER — PATIENT OUTREACH (OUTPATIENT)
Dept: ADMINISTRATIVE | Facility: HOSPITAL | Age: 66
End: 2021-05-04

## 2021-05-05 ENCOUNTER — PATIENT OUTREACH (OUTPATIENT)
Dept: ADMINISTRATIVE | Facility: OTHER | Age: 66
End: 2021-05-05

## 2021-05-06 ENCOUNTER — OFFICE VISIT (OUTPATIENT)
Dept: INTERNAL MEDICINE | Facility: CLINIC | Age: 66
End: 2021-05-06
Payer: MEDICARE

## 2021-05-06 ENCOUNTER — LAB VISIT (OUTPATIENT)
Dept: LAB | Facility: HOSPITAL | Age: 66
End: 2021-05-06
Attending: INTERNAL MEDICINE
Payer: MEDICARE

## 2021-05-06 ENCOUNTER — TELEPHONE (OUTPATIENT)
Dept: INTERNAL MEDICINE | Facility: CLINIC | Age: 66
End: 2021-05-06

## 2021-05-06 ENCOUNTER — PATIENT MESSAGE (OUTPATIENT)
Dept: INTERNAL MEDICINE | Facility: CLINIC | Age: 66
End: 2021-05-06

## 2021-05-06 VITALS
SYSTOLIC BLOOD PRESSURE: 116 MMHG | DIASTOLIC BLOOD PRESSURE: 66 MMHG | HEART RATE: 58 BPM | BODY MASS INDEX: 27.35 KG/M2 | OXYGEN SATURATION: 96 % | HEIGHT: 66 IN | WEIGHT: 170.19 LBS

## 2021-05-06 DIAGNOSIS — E11.9 TYPE 2 DIABETES MELLITUS WITHOUT COMPLICATION, WITH LONG-TERM CURRENT USE OF INSULIN: ICD-10-CM

## 2021-05-06 DIAGNOSIS — F10.20 ALCOHOL USE DISORDER, MODERATE, DEPENDENCE: Primary | ICD-10-CM

## 2021-05-06 DIAGNOSIS — E87.1 SERUM SODIUM DECREASED: ICD-10-CM

## 2021-05-06 DIAGNOSIS — L65.9 HAIR LOSS: ICD-10-CM

## 2021-05-06 DIAGNOSIS — F10.20 ALCOHOL USE DISORDER, MODERATE, DEPENDENCE: ICD-10-CM

## 2021-05-06 DIAGNOSIS — I48.0 PAROXYSMAL ATRIAL FIBRILLATION: ICD-10-CM

## 2021-05-06 DIAGNOSIS — Z79.4 TYPE 2 DIABETES MELLITUS WITHOUT COMPLICATION, WITH LONG-TERM CURRENT USE OF INSULIN: ICD-10-CM

## 2021-05-06 DIAGNOSIS — E78.5 HYPERLIPIDEMIA, UNSPECIFIED HYPERLIPIDEMIA TYPE: ICD-10-CM

## 2021-05-06 LAB
ANION GAP SERPL CALC-SCNC: 10 MMOL/L (ref 8–16)
BUN SERPL-MCNC: 4 MG/DL (ref 8–23)
CALCIUM SERPL-MCNC: 10.2 MG/DL (ref 8.7–10.5)
CHLORIDE SERPL-SCNC: 98 MMOL/L (ref 95–110)
CHOLEST SERPL-MCNC: 78 MG/DL (ref 120–199)
CHOLEST/HDLC SERPL: 1.8 {RATIO} (ref 2–5)
CO2 SERPL-SCNC: 28 MMOL/L (ref 23–29)
CREAT SERPL-MCNC: 0.7 MG/DL (ref 0.5–1.4)
EST. GFR  (AFRICAN AMERICAN): >60 ML/MIN/1.73 M^2
EST. GFR  (NON AFRICAN AMERICAN): >60 ML/MIN/1.73 M^2
ESTIMATED AVG GLUCOSE: 94 MG/DL (ref 68–131)
GGT SERPL-CCNC: 127 U/L (ref 8–55)
GLUCOSE SERPL-MCNC: 89 MG/DL (ref 70–110)
HBA1C MFR BLD: 4.9 % (ref 4–5.6)
HDLC SERPL-MCNC: 43 MG/DL (ref 40–75)
HDLC SERPL: 55.1 % (ref 20–50)
LDLC SERPL CALC-MCNC: 21.8 MG/DL (ref 63–159)
NONHDLC SERPL-MCNC: 35 MG/DL
POTASSIUM SERPL-SCNC: 4.4 MMOL/L (ref 3.5–5.1)
SODIUM SERPL-SCNC: 136 MMOL/L (ref 136–145)
T4 SERPL-MCNC: 6.4 UG/DL (ref 4.5–11.5)
TRIGL SERPL-MCNC: 66 MG/DL (ref 30–150)
TSH SERPL DL<=0.005 MIU/L-ACNC: 1.37 UIU/ML (ref 0.4–4)

## 2021-05-06 PROCEDURE — 36415 COLL VENOUS BLD VENIPUNCTURE: CPT | Performed by: INTERNAL MEDICINE

## 2021-05-06 PROCEDURE — 1101F PT FALLS ASSESS-DOCD LE1/YR: CPT | Mod: CPTII,S$GLB,, | Performed by: INTERNAL MEDICINE

## 2021-05-06 PROCEDURE — 1126F AMNT PAIN NOTED NONE PRSNT: CPT | Mod: S$GLB,,, | Performed by: INTERNAL MEDICINE

## 2021-05-06 PROCEDURE — 3288F PR FALLS RISK ASSESSMENT DOCUMENTED: ICD-10-PCS | Mod: CPTII,S$GLB,, | Performed by: INTERNAL MEDICINE

## 2021-05-06 PROCEDURE — 3051F PR MOST RECENT HEMOGLOBIN A1C LEVEL 7.0 - < 8.0%: ICD-10-PCS | Mod: CPTII,S$GLB,, | Performed by: INTERNAL MEDICINE

## 2021-05-06 PROCEDURE — 84436 ASSAY OF TOTAL THYROXINE: CPT | Performed by: INTERNAL MEDICINE

## 2021-05-06 PROCEDURE — 80048 BASIC METABOLIC PNL TOTAL CA: CPT | Performed by: INTERNAL MEDICINE

## 2021-05-06 PROCEDURE — 83036 HEMOGLOBIN GLYCOSYLATED A1C: CPT | Performed by: INTERNAL MEDICINE

## 2021-05-06 PROCEDURE — 80061 LIPID PANEL: CPT | Performed by: INTERNAL MEDICINE

## 2021-05-06 PROCEDURE — 99499 RISK ADDL DX/OHS AUDIT: ICD-10-PCS | Mod: S$GLB,,, | Performed by: INTERNAL MEDICINE

## 2021-05-06 PROCEDURE — 82977 ASSAY OF GGT: CPT | Performed by: INTERNAL MEDICINE

## 2021-05-06 PROCEDURE — 99214 OFFICE O/P EST MOD 30 MIN: CPT | Mod: S$GLB,,, | Performed by: INTERNAL MEDICINE

## 2021-05-06 PROCEDURE — 1159F PR MEDICATION LIST DOCUMENTED IN MEDICAL RECORD: ICD-10-PCS | Mod: S$GLB,,, | Performed by: INTERNAL MEDICINE

## 2021-05-06 PROCEDURE — 1159F MED LIST DOCD IN RCRD: CPT | Mod: S$GLB,,, | Performed by: INTERNAL MEDICINE

## 2021-05-06 PROCEDURE — 3051F HG A1C>EQUAL 7.0%<8.0%: CPT | Mod: CPTII,S$GLB,, | Performed by: INTERNAL MEDICINE

## 2021-05-06 PROCEDURE — 3008F PR BODY MASS INDEX (BMI) DOCUMENTED: ICD-10-PCS | Mod: CPTII,S$GLB,, | Performed by: INTERNAL MEDICINE

## 2021-05-06 PROCEDURE — 1126F PR PAIN SEVERITY QUANTIFIED, NO PAIN PRESENT: ICD-10-PCS | Mod: S$GLB,,, | Performed by: INTERNAL MEDICINE

## 2021-05-06 PROCEDURE — 99499 UNLISTED E&M SERVICE: CPT | Mod: S$GLB,,, | Performed by: INTERNAL MEDICINE

## 2021-05-06 PROCEDURE — 1101F PR PT FALLS ASSESS DOC 0-1 FALLS W/OUT INJ PAST YR: ICD-10-PCS | Mod: CPTII,S$GLB,, | Performed by: INTERNAL MEDICINE

## 2021-05-06 PROCEDURE — 99999 PR PBB SHADOW E&M-EST. PATIENT-LVL IV: ICD-10-PCS | Mod: PBBFAC,,, | Performed by: INTERNAL MEDICINE

## 2021-05-06 PROCEDURE — 84443 ASSAY THYROID STIM HORMONE: CPT | Performed by: INTERNAL MEDICINE

## 2021-05-06 PROCEDURE — 99214 PR OFFICE/OUTPT VISIT, EST, LEVL IV, 30-39 MIN: ICD-10-PCS | Mod: S$GLB,,, | Performed by: INTERNAL MEDICINE

## 2021-05-06 PROCEDURE — 3288F FALL RISK ASSESSMENT DOCD: CPT | Mod: CPTII,S$GLB,, | Performed by: INTERNAL MEDICINE

## 2021-05-06 PROCEDURE — 3008F BODY MASS INDEX DOCD: CPT | Mod: CPTII,S$GLB,, | Performed by: INTERNAL MEDICINE

## 2021-05-06 PROCEDURE — 99999 PR PBB SHADOW E&M-EST. PATIENT-LVL IV: CPT | Mod: PBBFAC,,, | Performed by: INTERNAL MEDICINE

## 2021-05-07 ENCOUNTER — CLINICAL SUPPORT (OUTPATIENT)
Dept: AUDIOLOGY | Facility: CLINIC | Age: 66
End: 2021-05-07
Payer: MEDICARE

## 2021-05-07 ENCOUNTER — OFFICE VISIT (OUTPATIENT)
Dept: OTOLARYNGOLOGY | Facility: CLINIC | Age: 66
End: 2021-05-07
Payer: MEDICARE

## 2021-05-07 ENCOUNTER — TELEPHONE (OUTPATIENT)
Dept: INTERNAL MEDICINE | Facility: CLINIC | Age: 66
End: 2021-05-07

## 2021-05-07 VITALS — HEART RATE: 63 BPM | SYSTOLIC BLOOD PRESSURE: 125 MMHG | DIASTOLIC BLOOD PRESSURE: 79 MMHG

## 2021-05-07 DIAGNOSIS — H90.6 MIXED CONDUCTIVE AND SENSORINEURAL HEARING LOSS OF BOTH EARS: ICD-10-CM

## 2021-05-07 DIAGNOSIS — H61.303 ACQUIRED STENOSIS OF BOTH EXTERNAL EAR CANALS: ICD-10-CM

## 2021-05-07 DIAGNOSIS — H61.21 IMPACTED CERUMEN OF RIGHT EAR: ICD-10-CM

## 2021-05-07 DIAGNOSIS — H60.92 OTITIS EXTERNA OF LEFT EAR, UNSPECIFIED CHRONICITY, UNSPECIFIED TYPE: Primary | ICD-10-CM

## 2021-05-07 PROCEDURE — 92504 PR EAR MICROSCOPY EXAMINATION: ICD-10-PCS | Mod: S$GLB,,, | Performed by: OTOLARYNGOLOGY

## 2021-05-07 PROCEDURE — 1126F AMNT PAIN NOTED NONE PRSNT: CPT | Mod: S$GLB,,, | Performed by: OTOLARYNGOLOGY

## 2021-05-07 PROCEDURE — 99999 PR PBB SHADOW E&M-EST. PATIENT-LVL IV: CPT | Mod: PBBFAC,,, | Performed by: OTOLARYNGOLOGY

## 2021-05-07 PROCEDURE — 99214 OFFICE O/P EST MOD 30 MIN: CPT | Mod: 25,S$GLB,, | Performed by: OTOLARYNGOLOGY

## 2021-05-07 PROCEDURE — 1159F MED LIST DOCD IN RCRD: CPT | Mod: S$GLB,,, | Performed by: OTOLARYNGOLOGY

## 2021-05-07 PROCEDURE — 1101F PT FALLS ASSESS-DOCD LE1/YR: CPT | Mod: CPTII,S$GLB,, | Performed by: OTOLARYNGOLOGY

## 2021-05-07 PROCEDURE — 92504 EAR MICROSCOPY EXAMINATION: CPT | Mod: S$GLB,,, | Performed by: OTOLARYNGOLOGY

## 2021-05-07 PROCEDURE — 1159F PR MEDICATION LIST DOCUMENTED IN MEDICAL RECORD: ICD-10-PCS | Mod: S$GLB,,, | Performed by: OTOLARYNGOLOGY

## 2021-05-07 PROCEDURE — 99999 PR PBB SHADOW E&M-EST. PATIENT-LVL IV: ICD-10-PCS | Mod: PBBFAC,,, | Performed by: OTOLARYNGOLOGY

## 2021-05-07 PROCEDURE — 3288F PR FALLS RISK ASSESSMENT DOCUMENTED: ICD-10-PCS | Mod: CPTII,S$GLB,, | Performed by: OTOLARYNGOLOGY

## 2021-05-07 PROCEDURE — 1126F PR PAIN SEVERITY QUANTIFIED, NO PAIN PRESENT: ICD-10-PCS | Mod: S$GLB,,, | Performed by: OTOLARYNGOLOGY

## 2021-05-07 PROCEDURE — 3288F FALL RISK ASSESSMENT DOCD: CPT | Mod: CPTII,S$GLB,, | Performed by: OTOLARYNGOLOGY

## 2021-05-07 PROCEDURE — 1101F PR PT FALLS ASSESS DOC 0-1 FALLS W/OUT INJ PAST YR: ICD-10-PCS | Mod: CPTII,S$GLB,, | Performed by: OTOLARYNGOLOGY

## 2021-05-07 PROCEDURE — 99214 PR OFFICE/OUTPT VISIT, EST, LEVL IV, 30-39 MIN: ICD-10-PCS | Mod: 25,S$GLB,, | Performed by: OTOLARYNGOLOGY

## 2021-05-07 RX ORDER — CIPROFLOXACIN AND DEXAMETHASONE 3; 1 MG/ML; MG/ML
4 SUSPENSION/ DROPS AURICULAR (OTIC) 2 TIMES DAILY
Qty: 7.5 ML | Refills: 0 | Status: SHIPPED | OUTPATIENT
Start: 2021-05-07 | End: 2021-05-21

## 2021-05-10 ENCOUNTER — OFFICE VISIT (OUTPATIENT)
Dept: OPTOMETRY | Facility: CLINIC | Age: 66
End: 2021-05-10
Payer: COMMERCIAL

## 2021-05-10 DIAGNOSIS — H52.4 PRESBYOPIA: Primary | ICD-10-CM

## 2021-05-10 DIAGNOSIS — H25.13 NS (NUCLEAR SCLEROSIS), BILATERAL: ICD-10-CM

## 2021-05-10 DIAGNOSIS — E11.9 TYPE 2 DIABETES MELLITUS WITHOUT OPHTHALMIC MANIFESTATIONS: ICD-10-CM

## 2021-05-10 DIAGNOSIS — E11.42 TYPE 2 DIABETES MELLITUS WITH DIABETIC POLYNEUROPATHY, WITHOUT LONG-TERM CURRENT USE OF INSULIN: ICD-10-CM

## 2021-05-10 PROCEDURE — 92015 DETERMINE REFRACTIVE STATE: CPT | Mod: S$GLB,,, | Performed by: OPTOMETRIST

## 2021-05-10 PROCEDURE — 99999 PR PBB SHADOW E&M-EST. PATIENT-LVL III: ICD-10-PCS | Mod: PBBFAC,,, | Performed by: OPTOMETRIST

## 2021-05-10 PROCEDURE — 92015 PR REFRACTION: ICD-10-PCS | Mod: S$GLB,,, | Performed by: OPTOMETRIST

## 2021-05-10 PROCEDURE — 92014 COMPRE OPH EXAM EST PT 1/>: CPT | Mod: S$GLB,,, | Performed by: OPTOMETRIST

## 2021-05-10 PROCEDURE — 99999 PR PBB SHADOW E&M-EST. PATIENT-LVL III: CPT | Mod: PBBFAC,,, | Performed by: OPTOMETRIST

## 2021-05-10 PROCEDURE — 92014 PR EYE EXAM, EST PATIENT,COMPREHESV: ICD-10-PCS | Mod: S$GLB,,, | Performed by: OPTOMETRIST

## 2021-05-21 ENCOUNTER — TELEPHONE (OUTPATIENT)
Dept: INTERNAL MEDICINE | Facility: CLINIC | Age: 66
End: 2021-05-21

## 2021-05-27 ENCOUNTER — TELEPHONE (OUTPATIENT)
Dept: OPTOMETRY | Facility: CLINIC | Age: 66
End: 2021-05-27

## 2021-05-27 ENCOUNTER — NURSE TRIAGE (OUTPATIENT)
Dept: ADMINISTRATIVE | Facility: CLINIC | Age: 66
End: 2021-05-27

## 2021-05-28 ENCOUNTER — HOSPITAL ENCOUNTER (OUTPATIENT)
Dept: RADIOLOGY | Facility: HOSPITAL | Age: 66
Discharge: HOME | End: 2021-05-28
Attending: PHYSICIAN ASSISTANT
Payer: MEDICARE

## 2021-05-28 ENCOUNTER — OFFICE VISIT (OUTPATIENT)
Dept: INTERNAL MEDICINE | Facility: CLINIC | Age: 66
End: 2021-05-28
Payer: MEDICARE

## 2021-05-28 VITALS
HEART RATE: 69 BPM | WEIGHT: 171.75 LBS | HEIGHT: 64 IN | RESPIRATION RATE: 16 BRPM | SYSTOLIC BLOOD PRESSURE: 108 MMHG | BODY MASS INDEX: 29.32 KG/M2 | DIASTOLIC BLOOD PRESSURE: 58 MMHG | OXYGEN SATURATION: 96 %

## 2021-05-28 DIAGNOSIS — M79.671 RIGHT FOOT PAIN: ICD-10-CM

## 2021-05-28 DIAGNOSIS — F41.9 ANXIETY: ICD-10-CM
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
## 2021-05-28 DIAGNOSIS — M25.512 ACUTE PAIN OF LEFT SHOULDER: Primary | ICD-10-CM

## 2021-05-28 DIAGNOSIS — F10.20 ALCOHOL USE DISORDER, MODERATE, DEPENDENCE: ICD-10-CM

## 2021-05-28 DIAGNOSIS — M25.512 ACUTE PAIN OF LEFT SHOULDER: ICD-10-CM

## 2021-05-28 PROCEDURE — 3288F PR FALLS RISK ASSESSMENT DOCUMENTED: ICD-10-PCS | Mod: CPTII,S$GLB,, | Performed by: PHYSICIAN ASSISTANT

## 2021-05-28 PROCEDURE — 73630 XR FOOT COMPLETE 3 VIEW RIGHT: ICD-10-PCS | Mod: 26,RT,, | Performed by: RADIOLOGY

## 2021-05-28 PROCEDURE — 73030 XR SHOULDER COMPLETE 2 OR MORE VIEWS LEFT: ICD-10-PCS | Mod: 26,LT,, | Performed by: RADIOLOGY

## 2021-05-28 PROCEDURE — 1159F PR MEDICATION LIST DOCUMENTED IN MEDICAL RECORD: ICD-10-PCS | Mod: S$GLB,,, | Performed by: PHYSICIAN ASSISTANT

## 2021-05-28 PROCEDURE — 3288F FALL RISK ASSESSMENT DOCD: CPT | Mod: CPTII,S$GLB,, | Performed by: PHYSICIAN ASSISTANT

## 2021-05-28 PROCEDURE — 99214 OFFICE O/P EST MOD 30 MIN: CPT | Mod: S$GLB,,, | Performed by: PHYSICIAN ASSISTANT

## 2021-05-28 PROCEDURE — 1100F PR PT FALLS ASSESS DOC 2+ FALLS/FALL W/INJURY/YR: ICD-10-PCS | Mod: CPTII,S$GLB,, | Performed by: PHYSICIAN ASSISTANT

## 2021-05-28 PROCEDURE — 73630 X-RAY EXAM OF FOOT: CPT | Mod: 26,RT,, | Performed by: RADIOLOGY

## 2021-05-28 PROCEDURE — 73030 X-RAY EXAM OF SHOULDER: CPT | Mod: TC,LT

## 2021-05-28 PROCEDURE — 3008F BODY MASS INDEX DOCD: CPT | Mod: CPTII,S$GLB,, | Performed by: PHYSICIAN ASSISTANT

## 2021-05-28 PROCEDURE — 99999 PR PBB SHADOW E&M-EST. PATIENT-LVL V: CPT | Mod: PBBFAC,,, | Performed by: PHYSICIAN ASSISTANT

## 2021-05-28 PROCEDURE — 3008F PR BODY MASS INDEX (BMI) DOCUMENTED: ICD-10-PCS | Mod: CPTII,S$GLB,, | Performed by: PHYSICIAN ASSISTANT

## 2021-05-28 PROCEDURE — 1159F MED LIST DOCD IN RCRD: CPT | Mod: S$GLB,,, | Performed by: PHYSICIAN ASSISTANT

## 2021-05-28 PROCEDURE — 99999 PR PBB SHADOW E&M-EST. PATIENT-LVL V: ICD-10-PCS | Mod: PBBFAC,,, | Performed by: PHYSICIAN ASSISTANT

## 2021-05-28 PROCEDURE — 73030 X-RAY EXAM OF SHOULDER: CPT | Mod: 26,LT,, | Performed by: RADIOLOGY

## 2021-05-28 PROCEDURE — 1126F PR PAIN SEVERITY QUANTIFIED, NO PAIN PRESENT: ICD-10-PCS | Mod: S$GLB,,, | Performed by: PHYSICIAN ASSISTANT

## 2021-05-28 PROCEDURE — 73630 X-RAY EXAM OF FOOT: CPT | Mod: TC,RT

## 2021-05-28 PROCEDURE — 99499 RISK ADDL DX/OHS AUDIT: ICD-10-PCS | Mod: S$GLB,,, | Performed by: PHYSICIAN ASSISTANT

## 2021-05-28 PROCEDURE — 1100F PTFALLS ASSESS-DOCD GE2>/YR: CPT | Mod: CPTII,S$GLB,, | Performed by: PHYSICIAN ASSISTANT

## 2021-05-28 PROCEDURE — 99214 PR OFFICE/OUTPT VISIT, EST, LEVL IV, 30-39 MIN: ICD-10-PCS | Mod: S$GLB,,, | Performed by: PHYSICIAN ASSISTANT

## 2021-05-28 PROCEDURE — 99499 UNLISTED E&M SERVICE: CPT | Mod: S$GLB,,, | Performed by: PHYSICIAN ASSISTANT

## 2021-05-28 PROCEDURE — 1126F AMNT PAIN NOTED NONE PRSNT: CPT | Mod: S$GLB,,, | Performed by: PHYSICIAN ASSISTANT

## 2021-05-31 ENCOUNTER — TELEPHONE (OUTPATIENT)
Dept: INTERNAL MEDICINE | Facility: CLINIC | Age: 66
End: 2021-05-31

## 2021-06-01 ENCOUNTER — CLINICAL SUPPORT (OUTPATIENT)
Dept: REHABILITATION | Facility: HOSPITAL | Age: 66
End: 2021-06-01
Payer: MEDICARE

## 2021-06-01 DIAGNOSIS — M25.612 DECREASED ROM OF LEFT SHOULDER: ICD-10-CM

## 2021-06-01 DIAGNOSIS — R29.898 WEAKNESS OF SHOULDER: ICD-10-CM

## 2021-06-01 DIAGNOSIS — M25.512 ACUTE PAIN OF LEFT SHOULDER: ICD-10-CM

## 2021-06-01 PROCEDURE — 97110 THERAPEUTIC EXERCISES: CPT

## 2021-06-01 PROCEDURE — 97161 PT EVAL LOW COMPLEX 20 MIN: CPT

## 2021-06-07 ENCOUNTER — CLINICAL SUPPORT (OUTPATIENT)
Dept: REHABILITATION | Facility: HOSPITAL | Age: 66
End: 2021-06-07
Payer: MEDICARE

## 2021-06-07 DIAGNOSIS — M25.512 ACUTE PAIN OF LEFT SHOULDER: ICD-10-CM

## 2021-06-07 DIAGNOSIS — M25.612 DECREASED ROM OF LEFT SHOULDER: ICD-10-CM

## 2021-06-07 DIAGNOSIS — R29.898 WEAKNESS OF SHOULDER: ICD-10-CM

## 2021-06-07 PROCEDURE — 97110 THERAPEUTIC EXERCISES: CPT | Mod: CQ

## 2021-06-15 ENCOUNTER — CLINICAL SUPPORT (OUTPATIENT)
Dept: REHABILITATION | Facility: HOSPITAL | Age: 66
End: 2021-06-15
Payer: MEDICARE

## 2021-06-15 DIAGNOSIS — R29.898 WEAKNESS OF SHOULDER: ICD-10-CM

## 2021-06-15 DIAGNOSIS — M25.612 DECREASED ROM OF LEFT SHOULDER: ICD-10-CM

## 2021-06-15 DIAGNOSIS — M25.512 ACUTE PAIN OF LEFT SHOULDER: ICD-10-CM

## 2021-06-15 PROCEDURE — 97110 THERAPEUTIC EXERCISES: CPT

## 2021-06-17 ENCOUNTER — CLINICAL SUPPORT (OUTPATIENT)
Dept: REHABILITATION | Facility: HOSPITAL | Age: 66
End: 2021-06-17
Payer: MEDICARE

## 2021-06-17 ENCOUNTER — DOCUMENTATION ONLY (OUTPATIENT)
Dept: REHABILITATION | Facility: HOSPITAL | Age: 66
End: 2021-06-17

## 2021-06-17 DIAGNOSIS — M25.512 ACUTE PAIN OF LEFT SHOULDER: ICD-10-CM

## 2021-06-17 DIAGNOSIS — M25.612 DECREASED ROM OF LEFT SHOULDER: ICD-10-CM

## 2021-06-17 DIAGNOSIS — R29.898 WEAKNESS OF SHOULDER: ICD-10-CM

## 2021-06-17 PROCEDURE — 97110 THERAPEUTIC EXERCISES: CPT

## 2021-06-28 ENCOUNTER — LAB VISIT (OUTPATIENT)
Dept: LAB | Facility: HOSPITAL | Age: 66
End: 2021-06-28
Attending: INTERNAL MEDICINE
Payer: MEDICARE

## 2021-06-28 ENCOUNTER — OFFICE VISIT (OUTPATIENT)
Dept: RHEUMATOLOGY | Facility: CLINIC | Age: 66
End: 2021-06-28
Payer: MEDICARE

## 2021-06-28 VITALS
SYSTOLIC BLOOD PRESSURE: 84 MMHG | HEART RATE: 58 BPM | BODY MASS INDEX: 29.02 KG/M2 | DIASTOLIC BLOOD PRESSURE: 53 MMHG | HEIGHT: 64 IN | WEIGHT: 170 LBS

## 2021-06-28 DIAGNOSIS — M25.512 CHRONIC LEFT SHOULDER PAIN: ICD-10-CM

## 2021-06-28 DIAGNOSIS — M15.9 PRIMARY OSTEOARTHRITIS INVOLVING MULTIPLE JOINTS: ICD-10-CM

## 2021-06-28 DIAGNOSIS — G89.29 CHRONIC LEFT SHOULDER PAIN: ICD-10-CM

## 2021-06-28 DIAGNOSIS — M1A.09X0 IDIOPATHIC CHRONIC GOUT OF MULTIPLE SITES WITHOUT TOPHUS: Primary | ICD-10-CM

## 2021-06-28 DIAGNOSIS — M1A.09X0 IDIOPATHIC CHRONIC GOUT OF MULTIPLE SITES WITHOUT TOPHUS: ICD-10-CM

## 2021-06-28 PROBLEM — M15.0 PRIMARY OSTEOARTHRITIS INVOLVING MULTIPLE JOINTS: Status: ACTIVE | Noted: 2021-06-28

## 2021-06-28 LAB — URATE SERPL-MCNC: 5 MG/DL (ref 3.4–7)

## 2021-06-28 PROCEDURE — 3288F PR FALLS RISK ASSESSMENT DOCUMENTED: ICD-10-PCS | Mod: CPTII,S$GLB,, | Performed by: INTERNAL MEDICINE

## 2021-06-28 PROCEDURE — 1101F PT FALLS ASSESS-DOCD LE1/YR: CPT | Mod: CPTII,S$GLB,, | Performed by: INTERNAL MEDICINE

## 2021-06-28 PROCEDURE — 84550 ASSAY OF BLOOD/URIC ACID: CPT | Performed by: INTERNAL MEDICINE

## 2021-06-28 PROCEDURE — 99999 PR PBB SHADOW E&M-EST. PATIENT-LVL III: ICD-10-PCS | Mod: PBBFAC,,, | Performed by: INTERNAL MEDICINE

## 2021-06-28 PROCEDURE — 1159F MED LIST DOCD IN RCRD: CPT | Mod: S$GLB,,, | Performed by: INTERNAL MEDICINE

## 2021-06-28 PROCEDURE — 3008F BODY MASS INDEX DOCD: CPT | Mod: CPTII,S$GLB,, | Performed by: INTERNAL MEDICINE

## 2021-06-28 PROCEDURE — 1125F AMNT PAIN NOTED PAIN PRSNT: CPT | Mod: S$GLB,,, | Performed by: INTERNAL MEDICINE

## 2021-06-28 PROCEDURE — 36415 COLL VENOUS BLD VENIPUNCTURE: CPT | Performed by: INTERNAL MEDICINE

## 2021-06-28 PROCEDURE — 99213 OFFICE O/P EST LOW 20 MIN: CPT | Mod: 25,S$GLB,, | Performed by: INTERNAL MEDICINE

## 2021-06-28 PROCEDURE — 3008F PR BODY MASS INDEX (BMI) DOCUMENTED: ICD-10-PCS | Mod: CPTII,S$GLB,, | Performed by: INTERNAL MEDICINE

## 2021-06-28 PROCEDURE — 99213 PR OFFICE/OUTPT VISIT, EST, LEVL III, 20-29 MIN: ICD-10-PCS | Mod: 25,S$GLB,, | Performed by: INTERNAL MEDICINE

## 2021-06-28 PROCEDURE — 96372 PR INJECTION,THERAP/PROPH/DIAG2ST, IM OR SUBCUT: ICD-10-PCS | Mod: S$GLB,,, | Performed by: INTERNAL MEDICINE

## 2021-06-28 PROCEDURE — 1101F PR PT FALLS ASSESS DOC 0-1 FALLS W/OUT INJ PAST YR: ICD-10-PCS | Mod: CPTII,S$GLB,, | Performed by: INTERNAL MEDICINE

## 2021-06-28 PROCEDURE — 1125F PR PAIN SEVERITY QUANTIFIED, PAIN PRESENT: ICD-10-PCS | Mod: S$GLB,,, | Performed by: INTERNAL MEDICINE

## 2021-06-28 PROCEDURE — 1159F PR MEDICATION LIST DOCUMENTED IN MEDICAL RECORD: ICD-10-PCS | Mod: S$GLB,,, | Performed by: INTERNAL MEDICINE

## 2021-06-28 PROCEDURE — 96372 THER/PROPH/DIAG INJ SC/IM: CPT | Mod: S$GLB,,, | Performed by: INTERNAL MEDICINE

## 2021-06-28 PROCEDURE — 99999 PR PBB SHADOW E&M-EST. PATIENT-LVL III: CPT | Mod: PBBFAC,,, | Performed by: INTERNAL MEDICINE

## 2021-06-28 PROCEDURE — 3288F FALL RISK ASSESSMENT DOCD: CPT | Mod: CPTII,S$GLB,, | Performed by: INTERNAL MEDICINE

## 2021-06-28 RX ORDER — SERTRALINE HYDROCHLORIDE 25 MG/1
TABLET, FILM COATED ORAL
COMMUNITY
Start: 2021-06-02 | End: 2021-08-29

## 2021-06-28 RX ORDER — ALLOPURINOL 100 MG/1
200 TABLET ORAL DAILY
Qty: 180 TABLET | Refills: 3 | Status: SHIPPED | OUTPATIENT
Start: 2021-06-28 | End: 2021-06-29 | Stop reason: SDUPTHER

## 2021-06-28 RX ORDER — TRIAMCINOLONE ACETONIDE 40 MG/ML
40 INJECTION, SUSPENSION INTRA-ARTICULAR; INTRAMUSCULAR
Status: DISCONTINUED | OUTPATIENT
Start: 2021-06-28 | End: 2021-06-28 | Stop reason: HOSPADM

## 2021-06-28 RX ADMIN — TRIAMCINOLONE ACETONIDE 40 MG: 40 INJECTION, SUSPENSION INTRA-ARTICULAR; INTRAMUSCULAR at 10:06

## 2021-06-28 ASSESSMENT — ROUTINE ASSESSMENT OF PATIENT INDEX DATA (RAPID3)
TOTAL RAPID3 SCORE: 0
MDHAQ FUNCTION SCORE: 0
PSYCHOLOGICAL DISTRESS SCORE: 0
AM STIFFNESS SCORE: 0, NO
PATIENT GLOBAL ASSESSMENT SCORE: 0
FATIGUE SCORE: 0
PAIN SCORE: 0

## 2021-06-29 RX ORDER — ALLOPURINOL 100 MG/1
200 TABLET ORAL DAILY
Qty: 180 TABLET | Refills: 3 | Status: SHIPPED | OUTPATIENT
Start: 2021-06-29 | End: 2022-08-03 | Stop reason: SDUPTHER

## 2021-07-07 ENCOUNTER — PATIENT MESSAGE (OUTPATIENT)
Dept: ADMINISTRATIVE | Facility: HOSPITAL | Age: 66
End: 2021-07-07

## 2021-09-02 RX ORDER — SERTRALINE HYDROCHLORIDE 25 MG/1
TABLET, FILM COATED ORAL
Qty: 90 TABLET | Refills: 0 | Status: ON HOLD | OUTPATIENT
Start: 2021-09-02 | End: 2021-11-06 | Stop reason: HOSPADM

## 2021-09-17 ENCOUNTER — NURSE TRIAGE (OUTPATIENT)
Dept: ADMINISTRATIVE | Facility: CLINIC | Age: 66
End: 2021-09-17

## 2021-09-17 ENCOUNTER — OFFICE VISIT (OUTPATIENT)
Dept: INTERNAL MEDICINE | Facility: CLINIC | Age: 66
End: 2021-09-17
Payer: MEDICARE

## 2021-09-17 VITALS
HEIGHT: 64 IN | TEMPERATURE: 97 F | BODY MASS INDEX: 27.52 KG/M2 | HEART RATE: 107 BPM | WEIGHT: 161.19 LBS | DIASTOLIC BLOOD PRESSURE: 70 MMHG | SYSTOLIC BLOOD PRESSURE: 102 MMHG | OXYGEN SATURATION: 97 %

## 2021-09-17 DIAGNOSIS — R30.0 DYSURIA: Primary | ICD-10-CM

## 2021-09-17 DIAGNOSIS — N40.0 ENLARGED PROSTATE: ICD-10-CM

## 2021-09-17 PROCEDURE — 3078F DIAST BP <80 MM HG: CPT | Mod: CPTII,S$GLB,, | Performed by: INTERNAL MEDICINE

## 2021-09-17 PROCEDURE — 99999 PR PBB SHADOW E&M-EST. PATIENT-LVL V: CPT | Mod: PBBFAC,,, | Performed by: INTERNAL MEDICINE

## 2021-09-17 PROCEDURE — 1125F AMNT PAIN NOTED PAIN PRSNT: CPT | Mod: CPTII,S$GLB,, | Performed by: INTERNAL MEDICINE

## 2021-09-17 PROCEDURE — 3044F PR MOST RECENT HEMOGLOBIN A1C LEVEL <7.0%: ICD-10-PCS | Mod: CPTII,S$GLB,, | Performed by: INTERNAL MEDICINE

## 2021-09-17 PROCEDURE — 1100F PTFALLS ASSESS-DOCD GE2>/YR: CPT | Mod: CPTII,S$GLB,, | Performed by: INTERNAL MEDICINE

## 2021-09-17 PROCEDURE — 1125F PR PAIN SEVERITY QUANTIFIED, PAIN PRESENT: ICD-10-PCS | Mod: CPTII,S$GLB,, | Performed by: INTERNAL MEDICINE

## 2021-09-17 PROCEDURE — 99214 OFFICE O/P EST MOD 30 MIN: CPT | Mod: S$GLB,,, | Performed by: INTERNAL MEDICINE

## 2021-09-17 PROCEDURE — 1159F PR MEDICATION LIST DOCUMENTED IN MEDICAL RECORD: ICD-10-PCS | Mod: CPTII,S$GLB,, | Performed by: INTERNAL MEDICINE

## 2021-09-17 PROCEDURE — 3008F BODY MASS INDEX DOCD: CPT | Mod: CPTII,S$GLB,, | Performed by: INTERNAL MEDICINE

## 2021-09-17 PROCEDURE — 3288F PR FALLS RISK ASSESSMENT DOCUMENTED: ICD-10-PCS | Mod: CPTII,S$GLB,, | Performed by: INTERNAL MEDICINE

## 2021-09-17 PROCEDURE — 3044F HG A1C LEVEL LT 7.0%: CPT | Mod: CPTII,S$GLB,, | Performed by: INTERNAL MEDICINE

## 2021-09-17 PROCEDURE — 3288F FALL RISK ASSESSMENT DOCD: CPT | Mod: CPTII,S$GLB,, | Performed by: INTERNAL MEDICINE

## 2021-09-17 PROCEDURE — 3074F SYST BP LT 130 MM HG: CPT | Mod: CPTII,S$GLB,, | Performed by: INTERNAL MEDICINE

## 2021-09-17 PROCEDURE — 1159F MED LIST DOCD IN RCRD: CPT | Mod: CPTII,S$GLB,, | Performed by: INTERNAL MEDICINE

## 2021-09-17 PROCEDURE — 99214 PR OFFICE/OUTPT VISIT, EST, LEVL IV, 30-39 MIN: ICD-10-PCS | Mod: S$GLB,,, | Performed by: INTERNAL MEDICINE

## 2021-09-17 PROCEDURE — 3074F PR MOST RECENT SYSTOLIC BLOOD PRESSURE < 130 MM HG: ICD-10-PCS | Mod: CPTII,S$GLB,, | Performed by: INTERNAL MEDICINE

## 2021-09-17 PROCEDURE — 3008F PR BODY MASS INDEX (BMI) DOCUMENTED: ICD-10-PCS | Mod: CPTII,S$GLB,, | Performed by: INTERNAL MEDICINE

## 2021-09-17 PROCEDURE — 1100F PR PT FALLS ASSESS DOC 2+ FALLS/FALL W/INJURY/YR: ICD-10-PCS | Mod: CPTII,S$GLB,, | Performed by: INTERNAL MEDICINE

## 2021-09-17 PROCEDURE — 99999 PR PBB SHADOW E&M-EST. PATIENT-LVL V: ICD-10-PCS | Mod: PBBFAC,,, | Performed by: INTERNAL MEDICINE

## 2021-09-17 PROCEDURE — 3078F PR MOST RECENT DIASTOLIC BLOOD PRESSURE < 80 MM HG: ICD-10-PCS | Mod: CPTII,S$GLB,, | Performed by: INTERNAL MEDICINE

## 2021-09-17 RX ORDER — TAMSULOSIN HYDROCHLORIDE 0.4 MG/1
0.4 CAPSULE ORAL DAILY
Qty: 30 CAPSULE | Refills: 3 | Status: SHIPPED | OUTPATIENT
Start: 2021-09-17 | End: 2021-12-07

## 2021-09-17 RX ORDER — DOXYCYCLINE HYCLATE 100 MG
100 TABLET ORAL 2 TIMES DAILY
Qty: 20 TABLET | Refills: 0 | Status: SHIPPED | OUTPATIENT
Start: 2021-09-17 | End: 2021-09-20

## 2021-09-20 ENCOUNTER — OFFICE VISIT (OUTPATIENT)
Dept: UROLOGY | Facility: CLINIC | Age: 66
End: 2021-09-20
Payer: MEDICARE

## 2021-09-20 DIAGNOSIS — N13.8 BPH WITH OBSTRUCTION/LOWER URINARY TRACT SYMPTOMS: ICD-10-CM

## 2021-09-20 DIAGNOSIS — A49.9 BACTERIAL UTI: Primary | ICD-10-CM

## 2021-09-20 DIAGNOSIS — N39.0 BACTERIAL UTI: Primary | ICD-10-CM

## 2021-09-20 DIAGNOSIS — N40.1 BPH WITH OBSTRUCTION/LOWER URINARY TRACT SYMPTOMS: ICD-10-CM

## 2021-09-20 DIAGNOSIS — R30.0 DYSURIA: ICD-10-CM

## 2021-09-20 DIAGNOSIS — N40.0 ENLARGED PROSTATE: ICD-10-CM

## 2021-09-20 PROCEDURE — 99214 OFFICE O/P EST MOD 30 MIN: CPT | Mod: S$GLB,,, | Performed by: PHYSICIAN ASSISTANT

## 2021-09-20 PROCEDURE — 51798 PR MEAS,POST-VOID RES,US,NON-IMAGING: ICD-10-PCS | Mod: S$GLB,,, | Performed by: PHYSICIAN ASSISTANT

## 2021-09-20 PROCEDURE — 99999 PR PBB SHADOW E&M-EST. PATIENT-LVL III: CPT | Mod: PBBFAC,,, | Performed by: PHYSICIAN ASSISTANT

## 2021-09-20 PROCEDURE — 3044F PR MOST RECENT HEMOGLOBIN A1C LEVEL <7.0%: ICD-10-PCS | Mod: CPTII,S$GLB,, | Performed by: PHYSICIAN ASSISTANT

## 2021-09-20 PROCEDURE — 1160F RVW MEDS BY RX/DR IN RCRD: CPT | Mod: CPTII,S$GLB,, | Performed by: PHYSICIAN ASSISTANT

## 2021-09-20 PROCEDURE — 99999 PR PBB SHADOW E&M-EST. PATIENT-LVL III: ICD-10-PCS | Mod: PBBFAC,,, | Performed by: PHYSICIAN ASSISTANT

## 2021-09-20 PROCEDURE — 1159F PR MEDICATION LIST DOCUMENTED IN MEDICAL RECORD: ICD-10-PCS | Mod: CPTII,S$GLB,, | Performed by: PHYSICIAN ASSISTANT

## 2021-09-20 PROCEDURE — 1160F PR REVIEW ALL MEDS BY PRESCRIBER/CLIN PHARMACIST DOCUMENTED: ICD-10-PCS | Mod: CPTII,S$GLB,, | Performed by: PHYSICIAN ASSISTANT

## 2021-09-20 PROCEDURE — 1159F MED LIST DOCD IN RCRD: CPT | Mod: CPTII,S$GLB,, | Performed by: PHYSICIAN ASSISTANT

## 2021-09-20 PROCEDURE — 99214 PR OFFICE/OUTPT VISIT, EST, LEVL IV, 30-39 MIN: ICD-10-PCS | Mod: S$GLB,,, | Performed by: PHYSICIAN ASSISTANT

## 2021-09-20 PROCEDURE — 51798 US URINE CAPACITY MEASURE: CPT | Mod: S$GLB,,, | Performed by: PHYSICIAN ASSISTANT

## 2021-09-20 PROCEDURE — 3044F HG A1C LEVEL LT 7.0%: CPT | Mod: CPTII,S$GLB,, | Performed by: PHYSICIAN ASSISTANT

## 2021-09-20 RX ORDER — NITROFURANTOIN 25; 75 MG/1; MG/1
100 CAPSULE ORAL 2 TIMES DAILY
Qty: 14 CAPSULE | Refills: 0 | Status: SHIPPED | OUTPATIENT
Start: 2021-09-20 | End: 2021-09-27

## 2021-09-20 RX ORDER — LIDOCAINE HYDROCHLORIDE 20 MG/ML
JELLY TOPICAL ONCE
Status: CANCELLED | OUTPATIENT
Start: 2021-09-20 | End: 2021-09-20

## 2021-09-20 RX ORDER — DOXYCYCLINE HYCLATE 100 MG
100 TABLET ORAL ONCE
Status: CANCELLED | OUTPATIENT
Start: 2021-09-20 | End: 2021-09-20

## 2021-10-04 ENCOUNTER — PATIENT MESSAGE (OUTPATIENT)
Dept: ADMINISTRATIVE | Facility: HOSPITAL | Age: 66
End: 2021-10-04

## 2021-10-28 ENCOUNTER — IMMUNIZATION (OUTPATIENT)
Dept: INTERNAL MEDICINE | Facility: CLINIC | Age: 66
End: 2021-10-28
Payer: MEDICARE

## 2021-10-28 DIAGNOSIS — Z23 NEED FOR VACCINATION: Primary | ICD-10-CM

## 2021-10-28 PROCEDURE — 91300 COVID-19, MRNA, LNP-S, PF, 30 MCG/0.3 ML DOSE VACCINE: CPT | Mod: PBBFAC | Performed by: INTERNAL MEDICINE

## 2021-10-28 PROCEDURE — 0003A COVID-19, MRNA, LNP-S, PF, 30 MCG/0.3 ML DOSE VACCINE: CPT | Mod: CV19,PBBFAC | Performed by: INTERNAL MEDICINE

## 2021-10-31 ENCOUNTER — HOSPITAL ENCOUNTER (INPATIENT)
Facility: HOSPITAL | Age: 66
LOS: 4 days | Discharge: HOME | DRG: 897 | End: 2021-11-06
Attending: EMERGENCY MEDICINE | Admitting: INTERNAL MEDICINE
Payer: MEDICARE

## 2021-10-31 DIAGNOSIS — F10.932 ALCOHOL WITHDRAWAL SYNDROME WITH PERCEPTUAL DISTURBANCE: ICD-10-CM

## 2021-10-31 DIAGNOSIS — R06.02 SHORTNESS OF BREATH: ICD-10-CM

## 2021-10-31 DIAGNOSIS — F41.1 GAD (GENERALIZED ANXIETY DISORDER): ICD-10-CM

## 2021-10-31 DIAGNOSIS — I48.3 TYPICAL ATRIAL FLUTTER: ICD-10-CM

## 2021-10-31 DIAGNOSIS — R00.1 BRADYCARDIA: ICD-10-CM

## 2021-10-31 DIAGNOSIS — F10.20 ALCOHOL USE DISORDER, MODERATE, DEPENDENCE: ICD-10-CM

## 2021-10-31 DIAGNOSIS — R55 SYNCOPE: ICD-10-CM

## 2021-10-31 DIAGNOSIS — R00.1 SINUS BRADYCARDIA: ICD-10-CM

## 2021-10-31 DIAGNOSIS — I50.9 CHF (CONGESTIVE HEART FAILURE): ICD-10-CM

## 2021-10-31 DIAGNOSIS — E87.1 HYPONATREMIA: Primary | ICD-10-CM

## 2021-10-31 DIAGNOSIS — R07.9 CHEST PAIN: ICD-10-CM

## 2021-10-31 DIAGNOSIS — R29.898 WEAKNESS OF SHOULDER: ICD-10-CM

## 2021-10-31 LAB
ALBUMIN SERPL BCP-MCNC: 3.9 G/DL (ref 3.5–5.2)
ALP SERPL-CCNC: 63 U/L (ref 55–135)
ALT SERPL W/O P-5'-P-CCNC: 56 U/L (ref 10–44)
ANION GAP SERPL CALC-SCNC: 12 MMOL/L (ref 8–16)
AST SERPL-CCNC: 103 U/L (ref 10–40)
BASOPHILS # BLD AUTO: 0.07 K/UL (ref 0–0.2)
BASOPHILS NFR BLD: 0.8 % (ref 0–1.9)
BILIRUB SERPL-MCNC: 1.3 MG/DL (ref 0.1–1)
BILIRUB UR QL STRIP: NEGATIVE
BNP SERPL-MCNC: 98 PG/ML (ref 0–99)
BUN SERPL-MCNC: 4 MG/DL (ref 8–23)
CALCIUM SERPL-MCNC: 9.9 MG/DL (ref 8.7–10.5)
CHLORIDE SERPL-SCNC: 92 MMOL/L (ref 95–110)
CLARITY UR REFRACT.AUTO: ABNORMAL
CO2 SERPL-SCNC: 23 MMOL/L (ref 23–29)
COLOR UR AUTO: YELLOW
CREAT SERPL-MCNC: 0.8 MG/DL (ref 0.5–1.4)
CREAT UR-MCNC: 60 MG/DL (ref 23–375)
CTP QC/QA: YES
D DIMER PPP IA.FEU-MCNC: 0.46 MG/L FEU
DIFFERENTIAL METHOD: ABNORMAL
EOSINOPHIL # BLD AUTO: 0 K/UL (ref 0–0.5)
EOSINOPHIL NFR BLD: 0.5 % (ref 0–8)
ERYTHROCYTE [DISTWIDTH] IN BLOOD BY AUTOMATED COUNT: 12.6 % (ref 11.5–14.5)
EST. GFR  (AFRICAN AMERICAN): >60 ML/MIN/1.73 M^2
EST. GFR  (NON AFRICAN AMERICAN): >60 ML/MIN/1.73 M^2
GLUCOSE SERPL-MCNC: 125 MG/DL (ref 70–110)
GLUCOSE SERPL-MCNC: 99 MG/DL (ref 70–110)
GLUCOSE UR QL STRIP: NEGATIVE
HCT VFR BLD AUTO: 42.1 % (ref 40–54)
HGB BLD-MCNC: 15.2 G/DL (ref 14–18)
HGB UR QL STRIP: ABNORMAL
HYALINE CASTS UR QL AUTO: 7 /LPF
IMM GRANULOCYTES # BLD AUTO: 0.04 K/UL (ref 0–0.04)
IMM GRANULOCYTES NFR BLD AUTO: 0.5 % (ref 0–0.5)
KETONES UR QL STRIP: NEGATIVE
LEUKOCYTE ESTERASE UR QL STRIP: NEGATIVE
LYMPHOCYTES # BLD AUTO: 1.8 K/UL (ref 1–4.8)
LYMPHOCYTES NFR BLD: 20.2 % (ref 18–48)
MAGNESIUM SERPL-MCNC: 1 MG/DL (ref 1.6–2.6)
MCH RBC QN AUTO: 33.9 PG (ref 27–31)
MCHC RBC AUTO-ENTMCNC: 36.1 G/DL (ref 32–36)
MCV RBC AUTO: 94 FL (ref 82–98)
MICROSCOPIC COMMENT: ABNORMAL
MONOCYTES # BLD AUTO: 0.8 K/UL (ref 0.3–1)
MONOCYTES NFR BLD: 8.8 % (ref 4–15)
NEUTROPHILS # BLD AUTO: 6.1 K/UL (ref 1.8–7.7)
NEUTROPHILS NFR BLD: 69.2 % (ref 38–73)
NITRITE UR QL STRIP: NEGATIVE
NRBC BLD-RTO: 0 /100 WBC
OSMOLALITY UR: 164 MOSM/KG (ref 50–1200)
PH UR STRIP: 5 [PH] (ref 5–8)
PLATELET # BLD AUTO: 114 K/UL (ref 150–450)
PMV BLD AUTO: 9.1 FL (ref 9.2–12.9)
POCT GLUCOSE: 99 MG/DL (ref 70–110)
POTASSIUM SERPL-SCNC: 3.1 MMOL/L (ref 3.5–5.1)
PROT SERPL-MCNC: 6.8 G/DL (ref 6–8.4)
PROT UR QL STRIP: NEGATIVE
RBC # BLD AUTO: 4.49 M/UL (ref 4.6–6.2)
RBC #/AREA URNS AUTO: 1 /HPF (ref 0–4)
SARS-COV-2 RDRP RESP QL NAA+PROBE: NEGATIVE
SODIUM SERPL-SCNC: 127 MMOL/L (ref 136–145)
SODIUM UR-SCNC: 15 MMOL/L (ref 20–250)
SP GR UR STRIP: 1 (ref 1–1.03)
TROPONIN I SERPL DL<=0.01 NG/ML-MCNC: 0.01 NG/ML (ref 0–0.03)
URN SPEC COLLECT METH UR: ABNORMAL
UUN UR-MCNC: 52 MG/DL (ref 140–1050)
WBC # BLD AUTO: 8.82 K/UL (ref 3.9–12.7)
WBC #/AREA URNS AUTO: 3 /HPF (ref 0–5)

## 2021-10-31 PROCEDURE — 82570 ASSAY OF URINE CREATININE: CPT | Performed by: PHYSICIAN ASSISTANT

## 2021-10-31 PROCEDURE — 83735 ASSAY OF MAGNESIUM: CPT | Performed by: PHYSICIAN ASSISTANT

## 2021-10-31 PROCEDURE — 93010 ELECTROCARDIOGRAM REPORT: CPT | Mod: ,,, | Performed by: INTERNAL MEDICINE

## 2021-10-31 PROCEDURE — 99285 EMERGENCY DEPT VISIT HI MDM: CPT | Mod: 25

## 2021-10-31 PROCEDURE — U0002 COVID-19 LAB TEST NON-CDC: HCPCS | Performed by: PHYSICIAN ASSISTANT

## 2021-10-31 PROCEDURE — 25000003 PHARM REV CODE 250: Performed by: PHYSICIAN ASSISTANT

## 2021-10-31 PROCEDURE — 96360 HYDRATION IV INFUSION INIT: CPT

## 2021-10-31 PROCEDURE — 25000003 PHARM REV CODE 250: Performed by: FAMILY MEDICINE

## 2021-10-31 PROCEDURE — 93010 EKG 12-LEAD: ICD-10-PCS | Mod: ,,, | Performed by: INTERNAL MEDICINE

## 2021-10-31 PROCEDURE — 63600175 PHARM REV CODE 636 W HCPCS: Performed by: FAMILY MEDICINE

## 2021-10-31 PROCEDURE — 85025 COMPLETE CBC W/AUTO DIFF WBC: CPT | Performed by: PHYSICIAN ASSISTANT

## 2021-10-31 PROCEDURE — 93005 ELECTROCARDIOGRAM TRACING: CPT

## 2021-10-31 PROCEDURE — 83935 ASSAY OF URINE OSMOLALITY: CPT | Performed by: PHYSICIAN ASSISTANT

## 2021-10-31 PROCEDURE — 85379 FIBRIN DEGRADATION QUANT: CPT | Performed by: PHYSICIAN ASSISTANT

## 2021-10-31 PROCEDURE — 86803 HEPATITIS C AB TEST: CPT | Performed by: EMERGENCY MEDICINE

## 2021-10-31 PROCEDURE — G0378 HOSPITAL OBSERVATION PER HR: HCPCS

## 2021-10-31 PROCEDURE — 99285 EMERGENCY DEPT VISIT HI MDM: CPT | Mod: CS,,, | Performed by: PHYSICIAN ASSISTANT

## 2021-10-31 PROCEDURE — 84540 ASSAY OF URINE/UREA-N: CPT | Performed by: INTERNAL MEDICINE

## 2021-10-31 PROCEDURE — 99285 PR EMERGENCY DEPT VISIT,LEVEL V: ICD-10-PCS | Mod: CS,,, | Performed by: PHYSICIAN ASSISTANT

## 2021-10-31 PROCEDURE — 80053 COMPREHEN METABOLIC PANEL: CPT | Performed by: PHYSICIAN ASSISTANT

## 2021-10-31 PROCEDURE — 83880 ASSAY OF NATRIURETIC PEPTIDE: CPT | Performed by: PHYSICIAN ASSISTANT

## 2021-10-31 PROCEDURE — 84484 ASSAY OF TROPONIN QUANT: CPT | Performed by: PHYSICIAN ASSISTANT

## 2021-10-31 PROCEDURE — 84300 ASSAY OF URINE SODIUM: CPT | Performed by: PHYSICIAN ASSISTANT

## 2021-10-31 PROCEDURE — 81001 URINALYSIS AUTO W/SCOPE: CPT | Performed by: PHYSICIAN ASSISTANT

## 2021-10-31 RX ORDER — SERTRALINE HYDROCHLORIDE 25 MG/1
25 TABLET, FILM COATED ORAL DAILY
Status: DISCONTINUED | OUTPATIENT
Start: 2021-11-01 | End: 2021-11-04

## 2021-10-31 RX ORDER — ROPINIROLE 0.25 MG/1
1 TABLET, FILM COATED ORAL NIGHTLY
Status: DISCONTINUED | OUTPATIENT
Start: 2021-10-31 | End: 2021-11-06 | Stop reason: HOSPADM

## 2021-10-31 RX ORDER — TALC
6 POWDER (GRAM) TOPICAL NIGHTLY PRN
Status: DISCONTINUED | OUTPATIENT
Start: 2021-10-31 | End: 2021-11-06 | Stop reason: HOSPADM

## 2021-10-31 RX ORDER — MAG HYDROX/ALUMINUM HYD/SIMETH 200-200-20
30 SUSPENSION, ORAL (FINAL DOSE FORM) ORAL 4 TIMES DAILY PRN
Status: DISCONTINUED | OUTPATIENT
Start: 2021-10-31 | End: 2021-11-06 | Stop reason: HOSPADM

## 2021-10-31 RX ORDER — IBUPROFEN 200 MG
24 TABLET ORAL
Status: DISCONTINUED | OUTPATIENT
Start: 2021-10-31 | End: 2021-11-06 | Stop reason: HOSPADM

## 2021-10-31 RX ORDER — TRAZODONE HYDROCHLORIDE 100 MG/1
100 TABLET ORAL NIGHTLY PRN
Status: DISCONTINUED | OUTPATIENT
Start: 2021-10-31 | End: 2021-11-06 | Stop reason: HOSPADM

## 2021-10-31 RX ORDER — THIAMINE HCL 100 MG
100 TABLET ORAL DAILY
Status: DISCONTINUED | OUTPATIENT
Start: 2021-11-01 | End: 2021-11-06 | Stop reason: HOSPADM

## 2021-10-31 RX ORDER — ALLOPURINOL 100 MG/1
200 TABLET ORAL DAILY
Status: DISCONTINUED | OUTPATIENT
Start: 2021-11-01 | End: 2021-11-06 | Stop reason: HOSPADM

## 2021-10-31 RX ORDER — DIAZEPAM 5 MG/1
10 TABLET ORAL
Status: COMPLETED | OUTPATIENT
Start: 2021-10-31 | End: 2021-10-31

## 2021-10-31 RX ORDER — FLECAINIDE ACETATE 100 MG/1
100 TABLET ORAL EVERY 12 HOURS
Status: DISCONTINUED | OUTPATIENT
Start: 2021-10-31 | End: 2021-11-06 | Stop reason: HOSPADM

## 2021-10-31 RX ORDER — POTASSIUM CHLORIDE 20 MEQ/1
40 TABLET, EXTENDED RELEASE ORAL ONCE
Status: COMPLETED | OUTPATIENT
Start: 2021-10-31 | End: 2021-10-31

## 2021-10-31 RX ORDER — METOPROLOL TARTRATE 25 MG/1
12.5 TABLET ORAL 2 TIMES DAILY
Status: DISCONTINUED | OUTPATIENT
Start: 2021-10-31 | End: 2021-11-06 | Stop reason: HOSPADM

## 2021-10-31 RX ORDER — FOLIC ACID 1 MG/1
1 TABLET ORAL DAILY
Status: DISCONTINUED | OUTPATIENT
Start: 2021-11-01 | End: 2021-11-06 | Stop reason: HOSPADM

## 2021-10-31 RX ORDER — PANTOPRAZOLE SODIUM 40 MG/1
40 TABLET, DELAYED RELEASE ORAL DAILY
Status: DISCONTINUED | OUTPATIENT
Start: 2021-11-01 | End: 2021-11-06 | Stop reason: HOSPADM

## 2021-10-31 RX ORDER — LORAZEPAM 1 MG/1
2 TABLET ORAL EVERY 4 HOURS PRN
Status: DISCONTINUED | OUTPATIENT
Start: 2021-10-31 | End: 2021-11-06 | Stop reason: HOSPADM

## 2021-10-31 RX ORDER — MAGNESIUM SULFATE HEPTAHYDRATE 40 MG/ML
4 INJECTION, SOLUTION INTRAVENOUS ONCE
Status: COMPLETED | OUTPATIENT
Start: 2021-10-31 | End: 2021-11-01

## 2021-10-31 RX ORDER — IBUPROFEN 200 MG
16 TABLET ORAL
Status: DISCONTINUED | OUTPATIENT
Start: 2021-10-31 | End: 2021-11-06 | Stop reason: HOSPADM

## 2021-10-31 RX ORDER — TAMSULOSIN HYDROCHLORIDE 0.4 MG/1
0.4 CAPSULE ORAL DAILY
Status: DISCONTINUED | OUTPATIENT
Start: 2021-11-01 | End: 2021-11-06 | Stop reason: HOSPADM

## 2021-10-31 RX ORDER — IPRATROPIUM BROMIDE AND ALBUTEROL SULFATE 2.5; .5 MG/3ML; MG/3ML
3 SOLUTION RESPIRATORY (INHALATION) EVERY 6 HOURS PRN
Status: DISCONTINUED | OUTPATIENT
Start: 2021-10-31 | End: 2021-11-06 | Stop reason: HOSPADM

## 2021-10-31 RX ORDER — GLUCAGON 1 MG
1 KIT INJECTION
Status: DISCONTINUED | OUTPATIENT
Start: 2021-10-31 | End: 2021-11-06 | Stop reason: HOSPADM

## 2021-10-31 RX ORDER — NALOXONE HCL 0.4 MG/ML
0.02 VIAL (ML) INJECTION
Status: DISCONTINUED | OUTPATIENT
Start: 2021-10-31 | End: 2021-11-06 | Stop reason: HOSPADM

## 2021-10-31 RX ORDER — ONDANSETRON 2 MG/ML
4 INJECTION INTRAMUSCULAR; INTRAVENOUS EVERY 8 HOURS PRN
Status: DISCONTINUED | OUTPATIENT
Start: 2021-10-31 | End: 2021-11-06 | Stop reason: HOSPADM

## 2021-10-31 RX ORDER — ACETAMINOPHEN 325 MG/1
650 TABLET ORAL EVERY 4 HOURS PRN
Status: DISCONTINUED | OUTPATIENT
Start: 2021-10-31 | End: 2021-11-06 | Stop reason: HOSPADM

## 2021-10-31 RX ORDER — DIAZEPAM 5 MG/1
10 TABLET ORAL EVERY 8 HOURS
Status: DISCONTINUED | OUTPATIENT
Start: 2021-10-31 | End: 2021-11-02

## 2021-10-31 RX ORDER — SODIUM CHLORIDE 0.9 % (FLUSH) 0.9 %
10 SYRINGE (ML) INJECTION EVERY 12 HOURS PRN
Status: DISCONTINUED | OUTPATIENT
Start: 2021-10-31 | End: 2021-11-06 | Stop reason: HOSPADM

## 2021-10-31 RX ORDER — OXYCODONE HYDROCHLORIDE 5 MG/1
5 TABLET ORAL EVERY 6 HOURS PRN
Status: DISCONTINUED | OUTPATIENT
Start: 2021-10-31 | End: 2021-11-02

## 2021-10-31 RX ORDER — ATORVASTATIN CALCIUM 20 MG/1
40 TABLET, FILM COATED ORAL DAILY
Status: DISCONTINUED | OUTPATIENT
Start: 2021-11-01 | End: 2021-11-06 | Stop reason: HOSPADM

## 2021-10-31 RX ORDER — INSULIN ASPART 100 [IU]/ML
1-10 INJECTION, SOLUTION INTRAVENOUS; SUBCUTANEOUS
Status: DISCONTINUED | OUTPATIENT
Start: 2021-10-31 | End: 2021-11-06 | Stop reason: HOSPADM

## 2021-10-31 RX ADMIN — SODIUM CHLORIDE 1000 ML: 0.9 INJECTION, SOLUTION INTRAVENOUS at 05:10

## 2021-10-31 RX ADMIN — APIXABAN 5 MG: 5 TABLET, FILM COATED ORAL at 08:10

## 2021-10-31 RX ADMIN — DIAZEPAM 10 MG: 5 TABLET ORAL at 09:10

## 2021-10-31 RX ADMIN — FLECAINIDE ACETATE 100 MG: 100 TABLET ORAL at 08:10

## 2021-10-31 RX ADMIN — POTASSIUM CHLORIDE 40 MEQ: 1500 TABLET, EXTENDED RELEASE ORAL at 05:10

## 2021-10-31 RX ADMIN — METOPROLOL TARTRATE 12.5 MG: 25 TABLET, FILM COATED ORAL at 08:10

## 2021-10-31 RX ADMIN — DIAZEPAM 10 MG: 5 TABLET ORAL at 05:10

## 2021-10-31 RX ADMIN — ROPINIROLE HYDROCHLORIDE 1 MG: 0.5 TABLET, FILM COATED ORAL at 08:10

## 2021-10-31 RX ADMIN — THIAMINE HYDROCHLORIDE: 100 INJECTION, SOLUTION INTRAMUSCULAR; INTRAVENOUS at 07:10

## 2021-11-01 PROBLEM — R06.09 EXERTIONAL DYSPNEA: Status: ACTIVE | Noted: 2021-11-01

## 2021-11-01 LAB
ANION GAP SERPL CALC-SCNC: 10 MMOL/L (ref 8–16)
ANION GAP SERPL CALC-SCNC: 8 MMOL/L (ref 8–16)
ASCENDING AORTA: 3.41 CM
AV INDEX (PROSTH): 0.79
AV MEAN GRADIENT: 1 MMHG
AV PEAK GRADIENT: 1 MMHG
AV VALVE AREA: 2.72 CM2
AV VELOCITY RATIO: 1.03
BASOPHILS # BLD AUTO: 0.04 K/UL (ref 0–0.2)
BASOPHILS NFR BLD: 0.8 % (ref 0–1.9)
BSA FOR ECHO PROCEDURE: 1.8 M2
BUN SERPL-MCNC: 4 MG/DL (ref 8–23)
BUN SERPL-MCNC: 4 MG/DL (ref 8–23)
CALCIUM SERPL-MCNC: 8.5 MG/DL (ref 8.7–10.5)
CALCIUM SERPL-MCNC: 9.1 MG/DL (ref 8.7–10.5)
CHLORIDE SERPL-SCNC: 99 MMOL/L (ref 95–110)
CHLORIDE SERPL-SCNC: 99 MMOL/L (ref 95–110)
CO2 SERPL-SCNC: 24 MMOL/L (ref 23–29)
CO2 SERPL-SCNC: 29 MMOL/L (ref 23–29)
CREAT SERPL-MCNC: 0.7 MG/DL (ref 0.5–1.4)
CREAT SERPL-MCNC: 0.7 MG/DL (ref 0.5–1.4)
CV ECHO LV RWT: 0.25 CM
DIFFERENTIAL METHOD: ABNORMAL
DOP CALC AO PEAK VEL: 0.61 M/S
DOP CALC AO VTI: 17.8 CM
DOP CALC LVOT AREA: 3.5 CM2
DOP CALC LVOT DIAMETER: 2.1 CM
DOP CALC LVOT PEAK VEL: 0.63 M/S
DOP CALC LVOT STROKE VOLUME: 48.47 CM3
DOP CALCLVOT PEAK VEL VTI: 14 CM
E WAVE DECELERATION TIME: 269.87 MSEC
E/A RATIO: 1.74
E/E' RATIO: 7.76 M/S
ECHO LV POSTERIOR WALL: 0.55 CM (ref 0.6–1.1)
EJECTION FRACTION: 45 %
EOSINOPHIL # BLD AUTO: 0.1 K/UL (ref 0–0.5)
EOSINOPHIL NFR BLD: 1.8 % (ref 0–8)
ERYTHROCYTE [DISTWIDTH] IN BLOOD BY AUTOMATED COUNT: 12.9 % (ref 11.5–14.5)
EST. GFR  (AFRICAN AMERICAN): >60 ML/MIN/1.73 M^2
EST. GFR  (AFRICAN AMERICAN): >60 ML/MIN/1.73 M^2
EST. GFR  (NON AFRICAN AMERICAN): >60 ML/MIN/1.73 M^2
EST. GFR  (NON AFRICAN AMERICAN): >60 ML/MIN/1.73 M^2
FRACTIONAL SHORTENING: 28 % (ref 28–44)
GLUCOSE SERPL-MCNC: 85 MG/DL (ref 70–110)
GLUCOSE SERPL-MCNC: 87 MG/DL (ref 70–110)
HCT VFR BLD AUTO: 39.1 % (ref 40–54)
HCV AB SERPL QL IA: NEGATIVE
HGB BLD-MCNC: 13.8 G/DL (ref 14–18)
IMM GRANULOCYTES # BLD AUTO: 0.02 K/UL (ref 0–0.04)
IMM GRANULOCYTES NFR BLD AUTO: 0.4 % (ref 0–0.5)
INTERVENTRICULAR SEPTUM: 0.65 CM (ref 0.6–1.1)
LA MAJOR: 6.5 CM
LA MINOR: 5.04 CM
LA WIDTH: 5.2 CM
LEFT ATRIUM SIZE: 3 CM
LEFT ATRIUM VOLUME INDEX: 42.1 ML/M2
LEFT ATRIUM VOLUME: 75.29 CM3
LEFT INTERNAL DIMENSION IN SYSTOLE: 3.2 CM (ref 2.1–4)
LEFT VENTRICLE DIASTOLIC VOLUME INDEX: 50.97 ML/M2
LEFT VENTRICLE DIASTOLIC VOLUME: 91.23 ML
LEFT VENTRICLE MASS INDEX: 44 G/M2
LEFT VENTRICLE SYSTOLIC VOLUME INDEX: 19.3 ML/M2
LEFT VENTRICLE SYSTOLIC VOLUME: 34.51 ML
LEFT VENTRICULAR INTERNAL DIMENSION IN DIASTOLE: 4.47 CM (ref 3.5–6)
LEFT VENTRICULAR MASS: 77.95 G
LV LATERAL E/E' RATIO: 7.33 M/S
LV SEPTAL E/E' RATIO: 8.25 M/S
LYMPHOCYTES # BLD AUTO: 1.4 K/UL (ref 1–4.8)
LYMPHOCYTES NFR BLD: 26.9 % (ref 18–48)
MAGNESIUM SERPL-MCNC: 2.1 MG/DL (ref 1.6–2.6)
MCH RBC QN AUTO: 33.6 PG (ref 27–31)
MCHC RBC AUTO-ENTMCNC: 35.3 G/DL (ref 32–36)
MCV RBC AUTO: 95 FL (ref 82–98)
MONOCYTES # BLD AUTO: 0.5 K/UL (ref 0.3–1)
MONOCYTES NFR BLD: 9.5 % (ref 4–15)
MV PEAK A VEL: 0.38 M/S
MV PEAK E VEL: 0.66 M/S
MV STENOSIS PRESSURE HALF TIME: 78.26 MS
MV VALVE AREA P 1/2 METHOD: 2.81 CM2
NEUTROPHILS # BLD AUTO: 3.1 K/UL (ref 1.8–7.7)
NEUTROPHILS NFR BLD: 60.6 % (ref 38–73)
NRBC BLD-RTO: 0 /100 WBC
OSMOLALITY SERPL: 277 MOSM/KG (ref 280–300)
PHOSPHATE SERPL-MCNC: 2.7 MG/DL (ref 2.7–4.5)
PLATELET # BLD AUTO: 80 K/UL (ref 150–450)
PMV BLD AUTO: 9.1 FL (ref 9.2–12.9)
POTASSIUM SERPL-SCNC: 3.3 MMOL/L (ref 3.5–5.1)
POTASSIUM SERPL-SCNC: 3.3 MMOL/L (ref 3.5–5.1)
RA MAJOR: 4.54 CM
RA PRESSURE: 3 MMHG
RA WIDTH: 2.98 CM
RBC # BLD AUTO: 4.11 M/UL (ref 4.6–6.2)
RIGHT VENTRICULAR END-DIASTOLIC DIMENSION: 3.19 CM
RV TISSUE DOPPLER FREE WALL SYSTOLIC VELOCITY 1 (APICAL 4 CHAMBER VIEW): 10 CM/S
SINUS: 3.08 CM
SODIUM SERPL-SCNC: 133 MMOL/L (ref 136–145)
SODIUM SERPL-SCNC: 136 MMOL/L (ref 136–145)
STJ: 3.4 CM
TDI LATERAL: 0.09 M/S
TDI SEPTAL: 0.08 M/S
TDI: 0.09 M/S
TRICUSPID ANNULAR PLANE SYSTOLIC EXCURSION: 1.75 CM
TSH SERPL DL<=0.005 MIU/L-ACNC: 1.75 UIU/ML (ref 0.4–4)
WBC # BLD AUTO: 5.05 K/UL (ref 3.9–12.7)

## 2021-11-01 PROCEDURE — 25000003 PHARM REV CODE 250: Performed by: INTERNAL MEDICINE

## 2021-11-01 PROCEDURE — 84100 ASSAY OF PHOSPHORUS: CPT | Performed by: FAMILY MEDICINE

## 2021-11-01 PROCEDURE — 25000003 PHARM REV CODE 250: Performed by: FAMILY MEDICINE

## 2021-11-01 PROCEDURE — 85025 COMPLETE CBC W/AUTO DIFF WBC: CPT | Performed by: FAMILY MEDICINE

## 2021-11-01 PROCEDURE — 94761 N-INVAS EAR/PLS OXIMETRY MLT: CPT

## 2021-11-01 PROCEDURE — 80048 BASIC METABOLIC PNL TOTAL CA: CPT | Mod: 91 | Performed by: FAMILY MEDICINE

## 2021-11-01 PROCEDURE — 99220 PR INITIAL OBSERVATION CARE,LEVL III: ICD-10-PCS | Mod: ,,, | Performed by: FAMILY MEDICINE

## 2021-11-01 PROCEDURE — 25500020 PHARM REV CODE 255: Performed by: INTERNAL MEDICINE

## 2021-11-01 PROCEDURE — 83930 ASSAY OF BLOOD OSMOLALITY: CPT | Performed by: FAMILY MEDICINE

## 2021-11-01 PROCEDURE — G0378 HOSPITAL OBSERVATION PER HR: HCPCS

## 2021-11-01 PROCEDURE — 99220 PR INITIAL OBSERVATION CARE,LEVL III: CPT | Mod: ,,, | Performed by: FAMILY MEDICINE

## 2021-11-01 PROCEDURE — 80048 BASIC METABOLIC PNL TOTAL CA: CPT | Performed by: FAMILY MEDICINE

## 2021-11-01 PROCEDURE — 83735 ASSAY OF MAGNESIUM: CPT | Performed by: FAMILY MEDICINE

## 2021-11-01 PROCEDURE — 36415 COLL VENOUS BLD VENIPUNCTURE: CPT | Performed by: FAMILY MEDICINE

## 2021-11-01 PROCEDURE — 84443 ASSAY THYROID STIM HORMONE: CPT | Performed by: FAMILY MEDICINE

## 2021-11-01 PROCEDURE — 63600175 PHARM REV CODE 636 W HCPCS: Performed by: INTERNAL MEDICINE

## 2021-11-01 RX ADMIN — METOPROLOL TARTRATE 12.5 MG: 25 TABLET, FILM COATED ORAL at 09:11

## 2021-11-01 RX ADMIN — THIAMINE HCL TAB 100 MG 100 MG: 100 TAB at 09:11

## 2021-11-01 RX ADMIN — FLECAINIDE ACETATE 100 MG: 100 TABLET ORAL at 09:11

## 2021-11-01 RX ADMIN — ALLOPURINOL 200 MG: 100 TABLET ORAL at 09:11

## 2021-11-01 RX ADMIN — APIXABAN 5 MG: 5 TABLET, FILM COATED ORAL at 09:11

## 2021-11-01 RX ADMIN — OXYCODONE 5 MG: 5 TABLET ORAL at 09:11

## 2021-11-01 RX ADMIN — POTASSIUM BICARBONATE 40 MEQ: 391 TABLET, EFFERVESCENT ORAL at 09:11

## 2021-11-01 RX ADMIN — DIAZEPAM 10 MG: 5 TABLET ORAL at 05:11

## 2021-11-01 RX ADMIN — LORAZEPAM 2 MG: 1 TABLET ORAL at 06:11

## 2021-11-01 RX ADMIN — MULTIPLE VITAMINS W/ MINERALS TAB 1 TABLET: TAB at 09:11

## 2021-11-01 RX ADMIN — PANTOPRAZOLE SODIUM 40 MG: 40 TABLET, DELAYED RELEASE ORAL at 09:11

## 2021-11-01 RX ADMIN — HUMAN ALBUMIN MICROSPHERES AND PERFLUTREN 0.66 MG: 10; .22 INJECTION, SOLUTION INTRAVENOUS at 09:11

## 2021-11-01 RX ADMIN — SERTRALINE HYDROCHLORIDE 25 MG: 25 TABLET ORAL at 09:11

## 2021-11-01 RX ADMIN — LORAZEPAM 2 MG: 1 TABLET ORAL at 12:11

## 2021-11-01 RX ADMIN — ROPINIROLE HYDROCHLORIDE 1 MG: 0.5 TABLET, FILM COATED ORAL at 09:11

## 2021-11-01 RX ADMIN — FOLIC ACID 1 MG: 1 TABLET ORAL at 09:11

## 2021-11-01 RX ADMIN — MAGNESIUM SULFATE IN WATER 4 G: 40 INJECTION, SOLUTION INTRAVENOUS at 12:11

## 2021-11-01 RX ADMIN — DIAZEPAM 10 MG: 5 TABLET ORAL at 09:11

## 2021-11-01 RX ADMIN — TAMSULOSIN HYDROCHLORIDE 0.4 MG: 0.4 CAPSULE ORAL at 09:11

## 2021-11-01 RX ADMIN — ATORVASTATIN CALCIUM 40 MG: 40 TABLET, FILM COATED ORAL at 09:11

## 2021-11-02 PROBLEM — F10.930 ALCOHOL WITHDRAWAL SYNDROME WITHOUT COMPLICATION: Status: ACTIVE | Noted: 2021-11-02

## 2021-11-02 PROBLEM — F10.932 ALCOHOL WITHDRAWAL SYNDROME WITH PERCEPTUAL DISTURBANCE: Status: ACTIVE | Noted: 2021-11-02

## 2021-11-02 LAB
ALBUMIN SERPL BCP-MCNC: 4 G/DL (ref 3.5–5.2)
ALP SERPL-CCNC: 59 U/L (ref 55–135)
ALT SERPL W/O P-5'-P-CCNC: 53 U/L (ref 10–44)
ANION GAP SERPL CALC-SCNC: 11 MMOL/L (ref 8–16)
AST SERPL-CCNC: 78 U/L (ref 10–40)
BASOPHILS # BLD AUTO: 0.03 K/UL (ref 0–0.2)
BASOPHILS NFR BLD: 0.5 % (ref 0–1.9)
BILIRUB SERPL-MCNC: 1.2 MG/DL (ref 0.1–1)
BUN SERPL-MCNC: 3 MG/DL (ref 8–23)
CALCIUM SERPL-MCNC: 9.1 MG/DL (ref 8.7–10.5)
CHLORIDE SERPL-SCNC: 93 MMOL/L (ref 95–110)
CO2 SERPL-SCNC: 26 MMOL/L (ref 23–29)
CREAT SERPL-MCNC: 0.8 MG/DL (ref 0.5–1.4)
DIFFERENTIAL METHOD: ABNORMAL
EOSINOPHIL # BLD AUTO: 0 K/UL (ref 0–0.5)
EOSINOPHIL NFR BLD: 0.3 % (ref 0–8)
ERYTHROCYTE [DISTWIDTH] IN BLOOD BY AUTOMATED COUNT: 12.8 % (ref 11.5–14.5)
EST. GFR  (AFRICAN AMERICAN): >60 ML/MIN/1.73 M^2
EST. GFR  (NON AFRICAN AMERICAN): >60 ML/MIN/1.73 M^2
ETHANOL SERPL-MCNC: <10 MG/DL
GLUCOSE SERPL-MCNC: 148 MG/DL (ref 70–110)
HCT VFR BLD AUTO: 38.6 % (ref 40–54)
HGB BLD-MCNC: 13.8 G/DL (ref 14–18)
IMM GRANULOCYTES # BLD AUTO: 0.04 K/UL (ref 0–0.04)
IMM GRANULOCYTES NFR BLD AUTO: 0.6 % (ref 0–0.5)
LYMPHOCYTES # BLD AUTO: 1.3 K/UL (ref 1–4.8)
LYMPHOCYTES NFR BLD: 19.7 % (ref 18–48)
MAGNESIUM SERPL-MCNC: 1.2 MG/DL (ref 1.6–2.6)
MCH RBC QN AUTO: 34.1 PG (ref 27–31)
MCHC RBC AUTO-ENTMCNC: 35.8 G/DL (ref 32–36)
MCV RBC AUTO: 95 FL (ref 82–98)
MONOCYTES # BLD AUTO: 0.5 K/UL (ref 0.3–1)
MONOCYTES NFR BLD: 8.4 % (ref 4–15)
NEUTROPHILS # BLD AUTO: 4.6 K/UL (ref 1.8–7.7)
NEUTROPHILS NFR BLD: 70.5 % (ref 38–73)
NRBC BLD-RTO: 0 /100 WBC
PHOSPHATE SERPL-MCNC: 2.2 MG/DL (ref 2.7–4.5)
PLATELET # BLD AUTO: 96 K/UL (ref 150–450)
PMV BLD AUTO: 9.7 FL (ref 9.2–12.9)
POCT GLUCOSE: 203 MG/DL (ref 70–110)
POTASSIUM SERPL-SCNC: 2.8 MMOL/L (ref 3.5–5.1)
PROT SERPL-MCNC: 6.9 G/DL (ref 6–8.4)
RBC # BLD AUTO: 4.05 M/UL (ref 4.6–6.2)
SODIUM SERPL-SCNC: 130 MMOL/L (ref 136–145)
WBC # BLD AUTO: 6.46 K/UL (ref 3.9–12.7)

## 2021-11-02 PROCEDURE — 63600175 PHARM REV CODE 636 W HCPCS

## 2021-11-02 PROCEDURE — 85025 COMPLETE CBC W/AUTO DIFF WBC: CPT | Performed by: FAMILY MEDICINE

## 2021-11-02 PROCEDURE — 25000003 PHARM REV CODE 250: Performed by: INTERNAL MEDICINE

## 2021-11-02 PROCEDURE — 99233 PR SUBSEQUENT HOSPITAL CARE,LEVL III: ICD-10-PCS | Mod: ,,, | Performed by: INTERNAL MEDICINE

## 2021-11-02 PROCEDURE — 99900035 HC TECH TIME PER 15 MIN (STAT)

## 2021-11-02 PROCEDURE — 11000001 HC ACUTE MED/SURG PRIVATE ROOM

## 2021-11-02 PROCEDURE — 83735 ASSAY OF MAGNESIUM: CPT | Performed by: FAMILY MEDICINE

## 2021-11-02 PROCEDURE — 63600175 PHARM REV CODE 636 W HCPCS: Performed by: FAMILY MEDICINE

## 2021-11-02 PROCEDURE — 63600175 PHARM REV CODE 636 W HCPCS: Performed by: INTERNAL MEDICINE

## 2021-11-02 PROCEDURE — 84100 ASSAY OF PHOSPHORUS: CPT | Performed by: INTERNAL MEDICINE

## 2021-11-02 PROCEDURE — 25000003 PHARM REV CODE 250: Performed by: FAMILY MEDICINE

## 2021-11-02 PROCEDURE — 82077 ASSAY SPEC XCP UR&BREATH IA: CPT | Performed by: INTERNAL MEDICINE

## 2021-11-02 PROCEDURE — 99215 OFFICE O/P EST HI 40 MIN: CPT | Mod: ,,, | Performed by: PSYCHIATRY & NEUROLOGY

## 2021-11-02 PROCEDURE — 80053 COMPREHEN METABOLIC PANEL: CPT | Performed by: INTERNAL MEDICINE

## 2021-11-02 PROCEDURE — 94761 N-INVAS EAR/PLS OXIMETRY MLT: CPT

## 2021-11-02 PROCEDURE — 99233 SBSQ HOSP IP/OBS HIGH 50: CPT | Mod: ,,, | Performed by: INTERNAL MEDICINE

## 2021-11-02 PROCEDURE — 99215 PR OFFICE/OUTPT VISIT, EST, LEVL V, 40-54 MIN: ICD-10-PCS | Mod: ,,, | Performed by: PSYCHIATRY & NEUROLOGY

## 2021-11-02 PROCEDURE — 36415 COLL VENOUS BLD VENIPUNCTURE: CPT | Performed by: INTERNAL MEDICINE

## 2021-11-02 RX ORDER — LORAZEPAM 2 MG/ML
2 INJECTION INTRAMUSCULAR ONCE
Status: COMPLETED | OUTPATIENT
Start: 2021-11-02 | End: 2021-11-02

## 2021-11-02 RX ORDER — MAGNESIUM SULFATE HEPTAHYDRATE 40 MG/ML
4 INJECTION, SOLUTION INTRAVENOUS ONCE
Status: COMPLETED | OUTPATIENT
Start: 2021-11-02 | End: 2021-11-02

## 2021-11-02 RX ORDER — HALOPERIDOL 5 MG/ML
5 INJECTION INTRAMUSCULAR ONCE
Status: DISCONTINUED | OUTPATIENT
Start: 2021-11-02 | End: 2021-11-02

## 2021-11-02 RX ORDER — HALOPERIDOL 5 MG/ML
INJECTION INTRAMUSCULAR
Status: COMPLETED
Start: 2021-11-02 | End: 2021-11-02

## 2021-11-02 RX ORDER — DIAZEPAM 5 MG/1
5 TABLET ORAL EVERY 8 HOURS
Status: DISCONTINUED | OUTPATIENT
Start: 2021-11-02 | End: 2021-11-02

## 2021-11-02 RX ORDER — DIAZEPAM 10 MG/2ML
10 INJECTION INTRAMUSCULAR EVERY 6 HOURS
Status: DISCONTINUED | OUTPATIENT
Start: 2021-11-02 | End: 2021-11-03

## 2021-11-02 RX ORDER — HALOPERIDOL 5 MG/ML
5 INJECTION INTRAMUSCULAR EVERY 6 HOURS PRN
Status: DISCONTINUED | OUTPATIENT
Start: 2021-11-02 | End: 2021-11-02

## 2021-11-02 RX ADMIN — POTASSIUM BICARBONATE 40 MEQ: 391 TABLET, EFFERVESCENT ORAL at 01:11

## 2021-11-02 RX ADMIN — APIXABAN 5 MG: 5 TABLET, FILM COATED ORAL at 09:11

## 2021-11-02 RX ADMIN — ATORVASTATIN CALCIUM 40 MG: 40 TABLET, FILM COATED ORAL at 08:11

## 2021-11-02 RX ADMIN — DIBASIC SODIUM PHOSPHATE, MONOBASIC POTASSIUM PHOSPHATE AND MONOBASIC SODIUM PHOSPHATE 2 TABLET: 852; 155; 130 TABLET ORAL at 01:11

## 2021-11-02 RX ADMIN — APIXABAN 5 MG: 5 TABLET, FILM COATED ORAL at 12:11

## 2021-11-02 RX ADMIN — THIAMINE HCL TAB 100 MG 100 MG: 100 TAB at 08:11

## 2021-11-02 RX ADMIN — FLECAINIDE ACETATE 100 MG: 100 TABLET ORAL at 08:11

## 2021-11-02 RX ADMIN — SERTRALINE HYDROCHLORIDE 25 MG: 25 TABLET ORAL at 08:11

## 2021-11-02 RX ADMIN — METOPROLOL TARTRATE 12.5 MG: 25 TABLET, FILM COATED ORAL at 08:11

## 2021-11-02 RX ADMIN — TAMSULOSIN HYDROCHLORIDE 0.4 MG: 0.4 CAPSULE ORAL at 08:11

## 2021-11-02 RX ADMIN — INSULIN ASPART 2 UNITS: 100 INJECTION, SOLUTION INTRAVENOUS; SUBCUTANEOUS at 10:11

## 2021-11-02 RX ADMIN — FLECAINIDE ACETATE 100 MG: 100 TABLET ORAL at 09:11

## 2021-11-02 RX ADMIN — PANTOPRAZOLE SODIUM 40 MG: 40 TABLET, DELAYED RELEASE ORAL at 08:11

## 2021-11-02 RX ADMIN — ALLOPURINOL 200 MG: 100 TABLET ORAL at 08:11

## 2021-11-02 RX ADMIN — HALOPERIDOL LACTATE 5 MG: 5 INJECTION, SOLUTION INTRAMUSCULAR at 01:11

## 2021-11-02 RX ADMIN — MULTIPLE VITAMINS W/ MINERALS TAB 1 TABLET: TAB at 08:11

## 2021-11-02 RX ADMIN — DIAZEPAM 10 MG: 5 TABLET ORAL at 01:11

## 2021-11-02 RX ADMIN — ROPINIROLE HYDROCHLORIDE 1 MG: 0.5 TABLET, FILM COATED ORAL at 09:11

## 2021-11-02 RX ADMIN — MAGNESIUM SULFATE 4 G: 2 INJECTION INTRAVENOUS at 11:11

## 2021-11-02 RX ADMIN — DIAZEPAM 10 MG: 5 INJECTION, SOLUTION INTRAMUSCULAR; INTRAVENOUS at 05:11

## 2021-11-02 RX ADMIN — POTASSIUM BICARBONATE 40 MEQ: 391 TABLET, EFFERVESCENT ORAL at 03:11

## 2021-11-02 RX ADMIN — POTASSIUM BICARBONATE 40 MEQ: 391 TABLET, EFFERVESCENT ORAL at 05:11

## 2021-11-02 RX ADMIN — FOLIC ACID 1 MG: 1 TABLET ORAL at 08:11

## 2021-11-02 RX ADMIN — LORAZEPAM 2 MG: 2 INJECTION INTRAMUSCULAR; INTRAVENOUS at 12:11

## 2021-11-03 LAB
ALBUMIN SERPL BCP-MCNC: 3.7 G/DL (ref 3.5–5.2)
ALP SERPL-CCNC: 57 U/L (ref 55–135)
ALT SERPL W/O P-5'-P-CCNC: 49 U/L (ref 10–44)
ANION GAP SERPL CALC-SCNC: 14 MMOL/L (ref 8–16)
AST SERPL-CCNC: 68 U/L (ref 10–40)
BASOPHILS # BLD AUTO: 0.04 K/UL (ref 0–0.2)
BASOPHILS NFR BLD: 0.7 % (ref 0–1.9)
BILIRUB SERPL-MCNC: 0.9 MG/DL (ref 0.1–1)
BUN SERPL-MCNC: <3 MG/DL (ref 8–23)
CALCIUM SERPL-MCNC: 8.8 MG/DL (ref 8.7–10.5)
CHLORIDE SERPL-SCNC: 93 MMOL/L (ref 95–110)
CO2 SERPL-SCNC: 26 MMOL/L (ref 23–29)
CREAT SERPL-MCNC: 0.7 MG/DL (ref 0.5–1.4)
DIFFERENTIAL METHOD: ABNORMAL
EOSINOPHIL # BLD AUTO: 0.1 K/UL (ref 0–0.5)
EOSINOPHIL NFR BLD: 1.2 % (ref 0–8)
ERYTHROCYTE [DISTWIDTH] IN BLOOD BY AUTOMATED COUNT: 13.2 % (ref 11.5–14.5)
EST. GFR  (AFRICAN AMERICAN): >60 ML/MIN/1.73 M^2
EST. GFR  (NON AFRICAN AMERICAN): >60 ML/MIN/1.73 M^2
GLUCOSE SERPL-MCNC: 103 MG/DL (ref 70–110)
HCT VFR BLD AUTO: 36.2 % (ref 40–54)
HGB BLD-MCNC: 12.5 G/DL (ref 14–18)
IMM GRANULOCYTES # BLD AUTO: 0.02 K/UL (ref 0–0.04)
IMM GRANULOCYTES NFR BLD AUTO: 0.3 % (ref 0–0.5)
LYMPHOCYTES # BLD AUTO: 1.6 K/UL (ref 1–4.8)
LYMPHOCYTES NFR BLD: 27 % (ref 18–48)
MAGNESIUM SERPL-MCNC: 1.7 MG/DL (ref 1.6–2.6)
MCH RBC QN AUTO: 34.2 PG (ref 27–31)
MCHC RBC AUTO-ENTMCNC: 34.5 G/DL (ref 32–36)
MCV RBC AUTO: 99 FL (ref 82–98)
MONOCYTES # BLD AUTO: 0.8 K/UL (ref 0.3–1)
MONOCYTES NFR BLD: 13.5 % (ref 4–15)
NEUTROPHILS # BLD AUTO: 3.4 K/UL (ref 1.8–7.7)
NEUTROPHILS NFR BLD: 57.3 % (ref 38–73)
NRBC BLD-RTO: 0 /100 WBC
PHOSPHATE SERPL-MCNC: 2.6 MG/DL (ref 2.7–4.5)
PLATELET # BLD AUTO: 75 K/UL (ref 150–450)
PMV BLD AUTO: 11.1 FL (ref 9.2–12.9)
POCT GLUCOSE: 117 MG/DL (ref 70–110)
POCT GLUCOSE: 133 MG/DL (ref 70–110)
POCT GLUCOSE: 149 MG/DL (ref 70–110)
POCT GLUCOSE: 177 MG/DL (ref 70–110)
POTASSIUM SERPL-SCNC: 3.2 MMOL/L (ref 3.5–5.1)
PROT SERPL-MCNC: 6.4 G/DL (ref 6–8.4)
RBC # BLD AUTO: 3.66 M/UL (ref 4.6–6.2)
SODIUM SERPL-SCNC: 133 MMOL/L (ref 136–145)
WBC # BLD AUTO: 5.92 K/UL (ref 3.9–12.7)

## 2021-11-03 PROCEDURE — 99233 PR SUBSEQUENT HOSPITAL CARE,LEVL III: ICD-10-PCS | Mod: ,,, | Performed by: INTERNAL MEDICINE

## 2021-11-03 PROCEDURE — 99232 PR SUBSEQUENT HOSPITAL CARE,LEVL II: ICD-10-PCS | Mod: ,,, | Performed by: PSYCHIATRY & NEUROLOGY

## 2021-11-03 PROCEDURE — 84100 ASSAY OF PHOSPHORUS: CPT | Performed by: INTERNAL MEDICINE

## 2021-11-03 PROCEDURE — 99233 SBSQ HOSP IP/OBS HIGH 50: CPT | Mod: ,,, | Performed by: INTERNAL MEDICINE

## 2021-11-03 PROCEDURE — 25000003 PHARM REV CODE 250: Performed by: INTERNAL MEDICINE

## 2021-11-03 PROCEDURE — 83735 ASSAY OF MAGNESIUM: CPT | Performed by: INTERNAL MEDICINE

## 2021-11-03 PROCEDURE — 25000003 PHARM REV CODE 250: Performed by: FAMILY MEDICINE

## 2021-11-03 PROCEDURE — 80053 COMPREHEN METABOLIC PANEL: CPT | Performed by: INTERNAL MEDICINE

## 2021-11-03 PROCEDURE — 63600175 PHARM REV CODE 636 W HCPCS: Performed by: INTERNAL MEDICINE

## 2021-11-03 PROCEDURE — 99232 SBSQ HOSP IP/OBS MODERATE 35: CPT | Mod: ,,, | Performed by: PSYCHIATRY & NEUROLOGY

## 2021-11-03 PROCEDURE — 85025 COMPLETE CBC W/AUTO DIFF WBC: CPT | Performed by: FAMILY MEDICINE

## 2021-11-03 PROCEDURE — 20600001 HC STEP DOWN PRIVATE ROOM

## 2021-11-03 RX ORDER — DIAZEPAM 5 MG/1
10 TABLET ORAL EVERY 6 HOURS
Status: DISCONTINUED | OUTPATIENT
Start: 2021-11-03 | End: 2021-11-04

## 2021-11-03 RX ADMIN — FLECAINIDE ACETATE 100 MG: 100 TABLET ORAL at 09:11

## 2021-11-03 RX ADMIN — DIAZEPAM 10 MG: 5 INJECTION, SOLUTION INTRAMUSCULAR; INTRAVENOUS at 01:11

## 2021-11-03 RX ADMIN — ATORVASTATIN CALCIUM 40 MG: 40 TABLET, FILM COATED ORAL at 09:11

## 2021-11-03 RX ADMIN — SERTRALINE HYDROCHLORIDE 25 MG: 25 TABLET ORAL at 09:11

## 2021-11-03 RX ADMIN — FOLIC ACID 1 MG: 1 TABLET ORAL at 09:11

## 2021-11-03 RX ADMIN — APIXABAN 5 MG: 5 TABLET, FILM COATED ORAL at 08:11

## 2021-11-03 RX ADMIN — DIAZEPAM 10 MG: 5 TABLET ORAL at 06:11

## 2021-11-03 RX ADMIN — DIAZEPAM 10 MG: 5 INJECTION, SOLUTION INTRAMUSCULAR; INTRAVENOUS at 05:11

## 2021-11-03 RX ADMIN — APIXABAN 5 MG: 5 TABLET, FILM COATED ORAL at 09:11

## 2021-11-03 RX ADMIN — PANTOPRAZOLE SODIUM 40 MG: 40 TABLET, DELAYED RELEASE ORAL at 09:11

## 2021-11-03 RX ADMIN — THIAMINE HCL TAB 100 MG 100 MG: 100 TAB at 09:11

## 2021-11-03 RX ADMIN — METOPROLOL TARTRATE 12.5 MG: 25 TABLET, FILM COATED ORAL at 09:11

## 2021-11-03 RX ADMIN — TAMSULOSIN HYDROCHLORIDE 0.4 MG: 0.4 CAPSULE ORAL at 09:11

## 2021-11-03 RX ADMIN — MULTIPLE VITAMINS W/ MINERALS TAB 1 TABLET: TAB at 09:11

## 2021-11-03 RX ADMIN — ROPINIROLE HYDROCHLORIDE 1 MG: 0.5 TABLET, FILM COATED ORAL at 08:11

## 2021-11-03 RX ADMIN — ALLOPURINOL 200 MG: 100 TABLET ORAL at 09:11

## 2021-11-04 LAB
ALBUMIN SERPL BCP-MCNC: 4.3 G/DL (ref 3.5–5.2)
ALP SERPL-CCNC: 63 U/L (ref 55–135)
ALT SERPL W/O P-5'-P-CCNC: 57 U/L (ref 10–44)
ANION GAP SERPL CALC-SCNC: 12 MMOL/L (ref 8–16)
AST SERPL-CCNC: 63 U/L (ref 10–40)
BASOPHILS # BLD AUTO: 0.04 K/UL (ref 0–0.2)
BASOPHILS NFR BLD: 0.7 % (ref 0–1.9)
BILIRUB SERPL-MCNC: 1 MG/DL (ref 0.1–1)
BUN SERPL-MCNC: 6 MG/DL (ref 8–23)
CALCIUM SERPL-MCNC: 9.6 MG/DL (ref 8.7–10.5)
CHLORIDE SERPL-SCNC: 97 MMOL/L (ref 95–110)
CO2 SERPL-SCNC: 29 MMOL/L (ref 23–29)
CREAT SERPL-MCNC: 0.8 MG/DL (ref 0.5–1.4)
DIFFERENTIAL METHOD: ABNORMAL
EOSINOPHIL # BLD AUTO: 0.1 K/UL (ref 0–0.5)
EOSINOPHIL NFR BLD: 1.6 % (ref 0–8)
ERYTHROCYTE [DISTWIDTH] IN BLOOD BY AUTOMATED COUNT: 13.5 % (ref 11.5–14.5)
EST. GFR  (AFRICAN AMERICAN): >60 ML/MIN/1.73 M^2
EST. GFR  (NON AFRICAN AMERICAN): >60 ML/MIN/1.73 M^2
GLUCOSE SERPL-MCNC: 122 MG/DL (ref 70–110)
HCT VFR BLD AUTO: 41.3 % (ref 40–54)
HGB BLD-MCNC: 13.9 G/DL (ref 14–18)
IMM GRANULOCYTES # BLD AUTO: 0.02 K/UL (ref 0–0.04)
IMM GRANULOCYTES NFR BLD AUTO: 0.3 % (ref 0–0.5)
LYMPHOCYTES # BLD AUTO: 1.9 K/UL (ref 1–4.8)
LYMPHOCYTES NFR BLD: 30.9 % (ref 18–48)
MAGNESIUM SERPL-MCNC: 1.4 MG/DL (ref 1.6–2.6)
MCH RBC QN AUTO: 34.3 PG (ref 27–31)
MCHC RBC AUTO-ENTMCNC: 33.7 G/DL (ref 32–36)
MCV RBC AUTO: 102 FL (ref 82–98)
MONOCYTES # BLD AUTO: 0.7 K/UL (ref 0.3–1)
MONOCYTES NFR BLD: 11.6 % (ref 4–15)
NEUTROPHILS # BLD AUTO: 3.4 K/UL (ref 1.8–7.7)
NEUTROPHILS NFR BLD: 54.9 % (ref 38–73)
NRBC BLD-RTO: 0 /100 WBC
PHOSPHATE SERPL-MCNC: 3.8 MG/DL (ref 2.7–4.5)
PLATELET # BLD AUTO: 105 K/UL (ref 150–450)
PMV BLD AUTO: 9.4 FL (ref 9.2–12.9)
POCT GLUCOSE: 126 MG/DL (ref 70–110)
POCT GLUCOSE: 133 MG/DL (ref 70–110)
POCT GLUCOSE: 137 MG/DL (ref 70–110)
POCT GLUCOSE: 197 MG/DL (ref 70–110)
POTASSIUM SERPL-SCNC: 4.2 MMOL/L (ref 3.5–5.1)
PROT SERPL-MCNC: 7.1 G/DL (ref 6–8.4)
RBC # BLD AUTO: 4.05 M/UL (ref 4.6–6.2)
SODIUM SERPL-SCNC: 138 MMOL/L (ref 136–145)
WBC # BLD AUTO: 6.12 K/UL (ref 3.9–12.7)

## 2021-11-04 PROCEDURE — 83735 ASSAY OF MAGNESIUM: CPT | Performed by: FAMILY MEDICINE

## 2021-11-04 PROCEDURE — 25000003 PHARM REV CODE 250: Performed by: INTERNAL MEDICINE

## 2021-11-04 PROCEDURE — 85025 COMPLETE CBC W/AUTO DIFF WBC: CPT | Performed by: FAMILY MEDICINE

## 2021-11-04 PROCEDURE — 36415 COLL VENOUS BLD VENIPUNCTURE: CPT | Performed by: FAMILY MEDICINE

## 2021-11-04 PROCEDURE — 20600001 HC STEP DOWN PRIVATE ROOM

## 2021-11-04 PROCEDURE — 25000003 PHARM REV CODE 250: Performed by: STUDENT IN AN ORGANIZED HEALTH CARE EDUCATION/TRAINING PROGRAM

## 2021-11-04 PROCEDURE — 99233 PR SUBSEQUENT HOSPITAL CARE,LEVL III: ICD-10-PCS | Mod: ,,, | Performed by: STUDENT IN AN ORGANIZED HEALTH CARE EDUCATION/TRAINING PROGRAM

## 2021-11-04 PROCEDURE — 99232 SBSQ HOSP IP/OBS MODERATE 35: CPT | Mod: ,,, | Performed by: PSYCHIATRY & NEUROLOGY

## 2021-11-04 PROCEDURE — 99232 PR SUBSEQUENT HOSPITAL CARE,LEVL II: ICD-10-PCS | Mod: ,,, | Performed by: PSYCHIATRY & NEUROLOGY

## 2021-11-04 PROCEDURE — 25000003 PHARM REV CODE 250: Performed by: FAMILY MEDICINE

## 2021-11-04 PROCEDURE — 80053 COMPREHEN METABOLIC PANEL: CPT | Performed by: INTERNAL MEDICINE

## 2021-11-04 PROCEDURE — 80321 ALCOHOLS BIOMARKERS 1OR 2: CPT | Performed by: INTERNAL MEDICINE

## 2021-11-04 PROCEDURE — 84100 ASSAY OF PHOSPHORUS: CPT | Performed by: INTERNAL MEDICINE

## 2021-11-04 PROCEDURE — 63600175 PHARM REV CODE 636 W HCPCS: Performed by: STUDENT IN AN ORGANIZED HEALTH CARE EDUCATION/TRAINING PROGRAM

## 2021-11-04 PROCEDURE — 99233 SBSQ HOSP IP/OBS HIGH 50: CPT | Mod: ,,, | Performed by: STUDENT IN AN ORGANIZED HEALTH CARE EDUCATION/TRAINING PROGRAM

## 2021-11-04 RX ORDER — MAGNESIUM SULFATE HEPTAHYDRATE 40 MG/ML
4 INJECTION, SOLUTION INTRAVENOUS ONCE
Status: COMPLETED | OUTPATIENT
Start: 2021-11-04 | End: 2021-11-04

## 2021-11-04 RX ORDER — NALTREXONE HYDROCHLORIDE 50 MG/1
50 TABLET, FILM COATED ORAL DAILY
Status: DISCONTINUED | OUTPATIENT
Start: 2021-11-04 | End: 2021-11-06 | Stop reason: HOSPADM

## 2021-11-04 RX ORDER — SODIUM,POTASSIUM PHOSPHATES 280-250MG
2 POWDER IN PACKET (EA) ORAL
Status: COMPLETED | OUTPATIENT
Start: 2021-11-04 | End: 2021-11-05

## 2021-11-04 RX ORDER — DIAZEPAM 5 MG/1
10 TABLET ORAL EVERY 8 HOURS
Status: DISCONTINUED | OUTPATIENT
Start: 2021-11-04 | End: 2021-11-05

## 2021-11-04 RX ORDER — SERTRALINE HYDROCHLORIDE 50 MG/1
50 TABLET, FILM COATED ORAL DAILY
Status: DISCONTINUED | OUTPATIENT
Start: 2021-11-04 | End: 2021-11-06 | Stop reason: HOSPADM

## 2021-11-04 RX ADMIN — ALLOPURINOL 200 MG: 100 TABLET ORAL at 08:11

## 2021-11-04 RX ADMIN — POTASSIUM & SODIUM PHOSPHATES POWDER PACK 280-160-250 MG 2 PACKET: 280-160-250 PACK at 12:11

## 2021-11-04 RX ADMIN — FLECAINIDE ACETATE 100 MG: 100 TABLET ORAL at 08:11

## 2021-11-04 RX ADMIN — APIXABAN 5 MG: 5 TABLET, FILM COATED ORAL at 08:11

## 2021-11-04 RX ADMIN — DIAZEPAM 10 MG: 5 TABLET ORAL at 12:11

## 2021-11-04 RX ADMIN — THIAMINE HCL TAB 100 MG 100 MG: 100 TAB at 08:11

## 2021-11-04 RX ADMIN — FOLIC ACID 1 MG: 1 TABLET ORAL at 08:11

## 2021-11-04 RX ADMIN — DIAZEPAM 10 MG: 5 TABLET ORAL at 08:11

## 2021-11-04 RX ADMIN — ROPINIROLE HYDROCHLORIDE 1 MG: 0.5 TABLET, FILM COATED ORAL at 08:11

## 2021-11-04 RX ADMIN — SERTRALINE HYDROCHLORIDE 50 MG: 50 TABLET ORAL at 08:11

## 2021-11-04 RX ADMIN — POTASSIUM & SODIUM PHOSPHATES POWDER PACK 280-160-250 MG 2 PACKET: 280-160-250 PACK at 08:11

## 2021-11-04 RX ADMIN — ATORVASTATIN CALCIUM 40 MG: 40 TABLET, FILM COATED ORAL at 08:11

## 2021-11-04 RX ADMIN — MAGNESIUM SULFATE 4 G: 2 INJECTION INTRAVENOUS at 12:11

## 2021-11-04 RX ADMIN — NALTREXONE HYDROCHLORIDE 50 MG: 50 TABLET, FILM COATED ORAL at 02:11

## 2021-11-04 RX ADMIN — METOPROLOL TARTRATE 12.5 MG: 25 TABLET, FILM COATED ORAL at 08:11

## 2021-11-04 RX ADMIN — POTASSIUM & SODIUM PHOSPHATES POWDER PACK 280-160-250 MG 2 PACKET: 280-160-250 PACK at 04:11

## 2021-11-04 RX ADMIN — DIAZEPAM 10 MG: 5 TABLET ORAL at 02:11

## 2021-11-04 RX ADMIN — TAMSULOSIN HYDROCHLORIDE 0.4 MG: 0.4 CAPSULE ORAL at 08:11

## 2021-11-04 RX ADMIN — PANTOPRAZOLE SODIUM 40 MG: 40 TABLET, DELAYED RELEASE ORAL at 08:11

## 2021-11-04 RX ADMIN — MULTIPLE VITAMINS W/ MINERALS TAB 1 TABLET: TAB at 08:11

## 2021-11-05 LAB
ALBUMIN SERPL BCP-MCNC: 3.6 G/DL (ref 3.5–5.2)
ALP SERPL-CCNC: 53 U/L (ref 55–135)
ALT SERPL W/O P-5'-P-CCNC: 45 U/L (ref 10–44)
ANION GAP SERPL CALC-SCNC: 9 MMOL/L (ref 8–16)
AST SERPL-CCNC: 43 U/L (ref 10–40)
BASOPHILS # BLD AUTO: 0.05 K/UL (ref 0–0.2)
BASOPHILS NFR BLD: 0.9 % (ref 0–1.9)
BILIRUB SERPL-MCNC: 0.5 MG/DL (ref 0.1–1)
BUN SERPL-MCNC: 8 MG/DL (ref 8–23)
CALCIUM SERPL-MCNC: 8.8 MG/DL (ref 8.7–10.5)
CHLORIDE SERPL-SCNC: 101 MMOL/L (ref 95–110)
CO2 SERPL-SCNC: 30 MMOL/L (ref 23–29)
CREAT SERPL-MCNC: 0.7 MG/DL (ref 0.5–1.4)
DIFFERENTIAL METHOD: ABNORMAL
EOSINOPHIL # BLD AUTO: 0.1 K/UL (ref 0–0.5)
EOSINOPHIL NFR BLD: 1.6 % (ref 0–8)
ERYTHROCYTE [DISTWIDTH] IN BLOOD BY AUTOMATED COUNT: 13.7 % (ref 11.5–14.5)
EST. GFR  (AFRICAN AMERICAN): >60 ML/MIN/1.73 M^2
EST. GFR  (NON AFRICAN AMERICAN): >60 ML/MIN/1.73 M^2
GLUCOSE SERPL-MCNC: 110 MG/DL (ref 70–110)
HCT VFR BLD AUTO: 35.8 % (ref 40–54)
HGB BLD-MCNC: 11.9 G/DL (ref 14–18)
IMM GRANULOCYTES # BLD AUTO: 0.04 K/UL (ref 0–0.04)
IMM GRANULOCYTES NFR BLD AUTO: 0.7 % (ref 0–0.5)
LYMPHOCYTES # BLD AUTO: 1.8 K/UL (ref 1–4.8)
LYMPHOCYTES NFR BLD: 32.1 % (ref 18–48)
MAGNESIUM SERPL-MCNC: 1.6 MG/DL (ref 1.6–2.6)
MCH RBC QN AUTO: 34.1 PG (ref 27–31)
MCHC RBC AUTO-ENTMCNC: 33.2 G/DL (ref 32–36)
MCV RBC AUTO: 103 FL (ref 82–98)
MONOCYTES # BLD AUTO: 0.8 K/UL (ref 0.3–1)
MONOCYTES NFR BLD: 14.1 % (ref 4–15)
NEUTROPHILS # BLD AUTO: 2.8 K/UL (ref 1.8–7.7)
NEUTROPHILS NFR BLD: 50.6 % (ref 38–73)
NRBC BLD-RTO: 0 /100 WBC
PHOSPHATE SERPL-MCNC: 3.8 MG/DL (ref 2.7–4.5)
PLATELET # BLD AUTO: 96 K/UL (ref 150–450)
PMV BLD AUTO: 9.3 FL (ref 9.2–12.9)
POCT GLUCOSE: 154 MG/DL (ref 70–110)
POCT GLUCOSE: 168 MG/DL (ref 70–110)
POCT GLUCOSE: 236 MG/DL (ref 70–110)
POCT GLUCOSE: 99 MG/DL (ref 70–110)
POTASSIUM SERPL-SCNC: 3.9 MMOL/L (ref 3.5–5.1)
PROT SERPL-MCNC: 6 G/DL (ref 6–8.4)
RBC # BLD AUTO: 3.49 M/UL (ref 4.6–6.2)
SODIUM SERPL-SCNC: 140 MMOL/L (ref 136–145)
WBC # BLD AUTO: 5.48 K/UL (ref 3.9–12.7)

## 2021-11-05 PROCEDURE — 84100 ASSAY OF PHOSPHORUS: CPT | Performed by: INTERNAL MEDICINE

## 2021-11-05 PROCEDURE — 25000003 PHARM REV CODE 250: Performed by: INTERNAL MEDICINE

## 2021-11-05 PROCEDURE — 25000003 PHARM REV CODE 250: Performed by: FAMILY MEDICINE

## 2021-11-05 PROCEDURE — 20600001 HC STEP DOWN PRIVATE ROOM

## 2021-11-05 PROCEDURE — 83735 ASSAY OF MAGNESIUM: CPT | Performed by: FAMILY MEDICINE

## 2021-11-05 PROCEDURE — 85025 COMPLETE CBC W/AUTO DIFF WBC: CPT | Performed by: FAMILY MEDICINE

## 2021-11-05 PROCEDURE — 99232 PR SUBSEQUENT HOSPITAL CARE,LEVL II: ICD-10-PCS | Mod: ,,, | Performed by: STUDENT IN AN ORGANIZED HEALTH CARE EDUCATION/TRAINING PROGRAM

## 2021-11-05 PROCEDURE — 36415 COLL VENOUS BLD VENIPUNCTURE: CPT | Performed by: INTERNAL MEDICINE

## 2021-11-05 PROCEDURE — 25000003 PHARM REV CODE 250: Performed by: STUDENT IN AN ORGANIZED HEALTH CARE EDUCATION/TRAINING PROGRAM

## 2021-11-05 PROCEDURE — 80053 COMPREHEN METABOLIC PANEL: CPT | Performed by: INTERNAL MEDICINE

## 2021-11-05 PROCEDURE — 63600175 PHARM REV CODE 636 W HCPCS: Performed by: FAMILY MEDICINE

## 2021-11-05 PROCEDURE — 99232 SBSQ HOSP IP/OBS MODERATE 35: CPT | Mod: ,,, | Performed by: STUDENT IN AN ORGANIZED HEALTH CARE EDUCATION/TRAINING PROGRAM

## 2021-11-05 RX ORDER — DIAZEPAM 5 MG/1
5 TABLET ORAL EVERY 12 HOURS
Status: DISCONTINUED | OUTPATIENT
Start: 2021-11-05 | End: 2021-11-06

## 2021-11-05 RX ADMIN — ROPINIROLE HYDROCHLORIDE 1 MG: 0.5 TABLET, FILM COATED ORAL at 09:11

## 2021-11-05 RX ADMIN — FOLIC ACID 1 MG: 1 TABLET ORAL at 08:11

## 2021-11-05 RX ADMIN — MULTIPLE VITAMINS W/ MINERALS TAB 1 TABLET: TAB at 08:11

## 2021-11-05 RX ADMIN — SERTRALINE HYDROCHLORIDE 50 MG: 50 TABLET ORAL at 08:11

## 2021-11-05 RX ADMIN — POTASSIUM & SODIUM PHOSPHATES POWDER PACK 280-160-250 MG 2 PACKET: 280-160-250 PACK at 05:11

## 2021-11-05 RX ADMIN — Medication 6 MG: at 09:11

## 2021-11-05 RX ADMIN — PANTOPRAZOLE SODIUM 40 MG: 40 TABLET, DELAYED RELEASE ORAL at 08:11

## 2021-11-05 RX ADMIN — INSULIN ASPART 4 UNITS: 100 INJECTION, SOLUTION INTRAVENOUS; SUBCUTANEOUS at 08:11

## 2021-11-05 RX ADMIN — DIAZEPAM 5 MG: 5 TABLET ORAL at 09:11

## 2021-11-05 RX ADMIN — THIAMINE HCL TAB 100 MG 100 MG: 100 TAB at 08:11

## 2021-11-05 RX ADMIN — FLECAINIDE ACETATE 100 MG: 100 TABLET ORAL at 09:11

## 2021-11-05 RX ADMIN — ATORVASTATIN CALCIUM 40 MG: 40 TABLET, FILM COATED ORAL at 08:11

## 2021-11-05 RX ADMIN — ALLOPURINOL 200 MG: 100 TABLET ORAL at 08:11

## 2021-11-05 RX ADMIN — FLECAINIDE ACETATE 100 MG: 100 TABLET ORAL at 08:11

## 2021-11-05 RX ADMIN — APIXABAN 5 MG: 5 TABLET, FILM COATED ORAL at 09:11

## 2021-11-05 RX ADMIN — NALTREXONE HYDROCHLORIDE 50 MG: 50 TABLET, FILM COATED ORAL at 08:11

## 2021-11-05 RX ADMIN — TAMSULOSIN HYDROCHLORIDE 0.4 MG: 0.4 CAPSULE ORAL at 08:11

## 2021-11-05 RX ADMIN — DIAZEPAM 5 MG: 5 TABLET ORAL at 08:11

## 2021-11-05 RX ADMIN — APIXABAN 5 MG: 5 TABLET, FILM COATED ORAL at 08:11

## 2021-11-06 VITALS
RESPIRATION RATE: 18 BRPM | WEIGHT: 154 LBS | HEART RATE: 50 BPM | DIASTOLIC BLOOD PRESSURE: 65 MMHG | BODY MASS INDEX: 24.75 KG/M2 | HEIGHT: 66 IN | TEMPERATURE: 98 F | SYSTOLIC BLOOD PRESSURE: 136 MMHG | OXYGEN SATURATION: 100 %

## 2021-11-06 LAB
ALBUMIN SERPL BCP-MCNC: 3.4 G/DL (ref 3.5–5.2)
ALP SERPL-CCNC: 47 U/L (ref 55–135)
ALT SERPL W/O P-5'-P-CCNC: 39 U/L (ref 10–44)
ANION GAP SERPL CALC-SCNC: 7 MMOL/L (ref 8–16)
AST SERPL-CCNC: 36 U/L (ref 10–40)
BASOPHILS # BLD AUTO: 0.04 K/UL (ref 0–0.2)
BASOPHILS NFR BLD: 0.7 % (ref 0–1.9)
BILIRUB SERPL-MCNC: 0.4 MG/DL (ref 0.1–1)
BUN SERPL-MCNC: 9 MG/DL (ref 8–23)
CALCIUM SERPL-MCNC: 8.9 MG/DL (ref 8.7–10.5)
CHLORIDE SERPL-SCNC: 103 MMOL/L (ref 95–110)
CO2 SERPL-SCNC: 28 MMOL/L (ref 23–29)
CREAT SERPL-MCNC: 0.8 MG/DL (ref 0.5–1.4)
DIFFERENTIAL METHOD: ABNORMAL
EOSINOPHIL # BLD AUTO: 0.1 K/UL (ref 0–0.5)
EOSINOPHIL NFR BLD: 1.6 % (ref 0–8)
ERYTHROCYTE [DISTWIDTH] IN BLOOD BY AUTOMATED COUNT: 13.8 % (ref 11.5–14.5)
EST. GFR  (AFRICAN AMERICAN): >60 ML/MIN/1.73 M^2
EST. GFR  (NON AFRICAN AMERICAN): >60 ML/MIN/1.73 M^2
GLUCOSE SERPL-MCNC: 167 MG/DL (ref 70–110)
HCT VFR BLD AUTO: 35.3 % (ref 40–54)
HGB BLD-MCNC: 11.6 G/DL (ref 14–18)
IMM GRANULOCYTES # BLD AUTO: 0.02 K/UL (ref 0–0.04)
IMM GRANULOCYTES NFR BLD AUTO: 0.4 % (ref 0–0.5)
LYMPHOCYTES # BLD AUTO: 1.8 K/UL (ref 1–4.8)
LYMPHOCYTES NFR BLD: 31.1 % (ref 18–48)
MAGNESIUM SERPL-MCNC: 1.4 MG/DL (ref 1.6–2.6)
MCH RBC QN AUTO: 33.9 PG (ref 27–31)
MCHC RBC AUTO-ENTMCNC: 32.9 G/DL (ref 32–36)
MCV RBC AUTO: 103 FL (ref 82–98)
MONOCYTES # BLD AUTO: 0.8 K/UL (ref 0.3–1)
MONOCYTES NFR BLD: 13.7 % (ref 4–15)
NEUTROPHILS # BLD AUTO: 3 K/UL (ref 1.8–7.7)
NEUTROPHILS NFR BLD: 52.5 % (ref 38–73)
NRBC BLD-RTO: 0 /100 WBC
PHOSPHATE SERPL-MCNC: 2.9 MG/DL (ref 2.7–4.5)
PLATELET # BLD AUTO: 95 K/UL (ref 150–450)
PMV BLD AUTO: 9.4 FL (ref 9.2–12.9)
POCT GLUCOSE: 107 MG/DL (ref 70–110)
POTASSIUM SERPL-SCNC: 3.9 MMOL/L (ref 3.5–5.1)
PROT SERPL-MCNC: 5.7 G/DL (ref 6–8.4)
RBC # BLD AUTO: 3.42 M/UL (ref 4.6–6.2)
SODIUM SERPL-SCNC: 138 MMOL/L (ref 136–145)
WBC # BLD AUTO: 5.62 K/UL (ref 3.9–12.7)

## 2021-11-06 PROCEDURE — 99239 PR HOSPITAL DISCHARGE DAY,>30 MIN: ICD-10-PCS | Mod: ,,, | Performed by: STUDENT IN AN ORGANIZED HEALTH CARE EDUCATION/TRAINING PROGRAM

## 2021-11-06 PROCEDURE — 25000003 PHARM REV CODE 250: Performed by: STUDENT IN AN ORGANIZED HEALTH CARE EDUCATION/TRAINING PROGRAM

## 2021-11-06 PROCEDURE — 93010 EKG 12-LEAD: ICD-10-PCS | Mod: ,,, | Performed by: INTERNAL MEDICINE

## 2021-11-06 PROCEDURE — 85025 COMPLETE CBC W/AUTO DIFF WBC: CPT | Performed by: FAMILY MEDICINE

## 2021-11-06 PROCEDURE — 84100 ASSAY OF PHOSPHORUS: CPT | Performed by: INTERNAL MEDICINE

## 2021-11-06 PROCEDURE — 1111F PR DISCHARGE MEDS RECONCILED W/ CURRENT OUTPATIENT MED LIST: ICD-10-PCS | Mod: CPTII,,, | Performed by: STUDENT IN AN ORGANIZED HEALTH CARE EDUCATION/TRAINING PROGRAM

## 2021-11-06 PROCEDURE — 25000003 PHARM REV CODE 250: Performed by: FAMILY MEDICINE

## 2021-11-06 PROCEDURE — 1111F DSCHRG MED/CURRENT MED MERGE: CPT | Mod: CPTII,,, | Performed by: STUDENT IN AN ORGANIZED HEALTH CARE EDUCATION/TRAINING PROGRAM

## 2021-11-06 PROCEDURE — 93005 ELECTROCARDIOGRAM TRACING: CPT

## 2021-11-06 PROCEDURE — 93010 ELECTROCARDIOGRAM REPORT: CPT | Mod: ,,, | Performed by: INTERNAL MEDICINE

## 2021-11-06 PROCEDURE — 83735 ASSAY OF MAGNESIUM: CPT | Performed by: FAMILY MEDICINE

## 2021-11-06 PROCEDURE — 99239 HOSP IP/OBS DSCHRG MGMT >30: CPT | Mod: ,,, | Performed by: STUDENT IN AN ORGANIZED HEALTH CARE EDUCATION/TRAINING PROGRAM

## 2021-11-06 PROCEDURE — 36415 COLL VENOUS BLD VENIPUNCTURE: CPT | Performed by: INTERNAL MEDICINE

## 2021-11-06 PROCEDURE — 80053 COMPREHEN METABOLIC PANEL: CPT | Performed by: INTERNAL MEDICINE

## 2021-11-06 RX ORDER — SERTRALINE HYDROCHLORIDE 50 MG/1
50 TABLET, FILM COATED ORAL DAILY
Qty: 30 TABLET | Refills: 2 | Status: SHIPPED | OUTPATIENT
Start: 2021-11-07 | End: 2022-02-07 | Stop reason: SDUPTHER

## 2021-11-06 RX ORDER — LANOLIN ALCOHOL/MO/W.PET/CERES
400 CREAM (GRAM) TOPICAL ONCE
Status: COMPLETED | OUTPATIENT
Start: 2021-11-06 | End: 2021-11-06

## 2021-11-06 RX ORDER — LANOLIN ALCOHOL/MO/W.PET/CERES
100 CREAM (GRAM) TOPICAL DAILY
Qty: 30 TABLET | Refills: 2 | Status: SHIPPED | OUTPATIENT
Start: 2021-11-07 | End: 2022-02-10

## 2021-11-06 RX ORDER — BISACODYL 5 MG/1
1 TABLET, COATED ORAL DAILY
Qty: 30 TABLET | Refills: 2 | Status: SHIPPED | OUTPATIENT
Start: 2021-11-06 | End: 2023-06-19 | Stop reason: SDUPTHER

## 2021-11-06 RX ORDER — NALTREXONE HYDROCHLORIDE 50 MG/1
50 TABLET, FILM COATED ORAL DAILY
Qty: 30 TABLET | Refills: 2 | Status: SHIPPED | OUTPATIENT
Start: 2021-11-06 | End: 2022-02-10

## 2021-11-06 RX ORDER — HYDROXYZINE HYDROCHLORIDE 25 MG/1
25 TABLET, FILM COATED ORAL 3 TIMES DAILY PRN
Qty: 60 TABLET | Refills: 2 | Status: SHIPPED | OUTPATIENT
Start: 2021-11-06 | End: 2022-03-10

## 2021-11-06 RX ADMIN — ATORVASTATIN CALCIUM 40 MG: 40 TABLET, FILM COATED ORAL at 09:11

## 2021-11-06 RX ADMIN — PANTOPRAZOLE SODIUM 40 MG: 40 TABLET, DELAYED RELEASE ORAL at 09:11

## 2021-11-06 RX ADMIN — TAMSULOSIN HYDROCHLORIDE 0.4 MG: 0.4 CAPSULE ORAL at 09:11

## 2021-11-06 RX ADMIN — FOLIC ACID 1 MG: 1 TABLET ORAL at 09:11

## 2021-11-06 RX ADMIN — THIAMINE HCL TAB 100 MG 100 MG: 100 TAB at 09:11

## 2021-11-06 RX ADMIN — Medication 400 MG: at 09:11

## 2021-11-06 RX ADMIN — ALLOPURINOL 200 MG: 100 TABLET ORAL at 09:11

## 2021-11-06 RX ADMIN — SERTRALINE HYDROCHLORIDE 50 MG: 50 TABLET ORAL at 09:11

## 2021-11-06 RX ADMIN — MULTIPLE VITAMINS W/ MINERALS TAB 1 TABLET: TAB at 09:11

## 2021-11-06 RX ADMIN — FLECAINIDE ACETATE 100 MG: 100 TABLET ORAL at 09:11

## 2021-11-06 RX ADMIN — APIXABAN 5 MG: 5 TABLET, FILM COATED ORAL at 09:11

## 2021-11-07 LAB
PETH 16:0/18.1 (POPETH): 1437 NG/ML
PETH 16:0/18.2 (PLPETH): 468 NG/ML

## 2021-11-09 ENCOUNTER — PATIENT OUTREACH (OUTPATIENT)
Dept: ADMINISTRATIVE | Facility: CLINIC | Age: 66
End: 2021-11-09
Payer: MEDICARE

## 2021-11-10 ENCOUNTER — OUTPATIENT CASE MANAGEMENT (OUTPATIENT)
Dept: ADMINISTRATIVE | Facility: OTHER | Age: 66
End: 2021-11-10
Payer: MEDICARE

## 2021-11-10 DIAGNOSIS — I48.0 PAROXYSMAL ATRIAL FIBRILLATION: ICD-10-CM

## 2021-12-14 DIAGNOSIS — G25.81 RESTLESS LEG: ICD-10-CM

## 2021-12-14 RX ORDER — ROPINIROLE 1 MG/1
1 TABLET, FILM COATED ORAL NIGHTLY
Qty: 30 TABLET | Refills: 0 | Status: SHIPPED | OUTPATIENT
Start: 2021-12-14 | End: 2021-12-17

## 2021-12-17 RX ORDER — ROPINIROLE 1 MG/1
TABLET, FILM COATED ORAL
Qty: 90 TABLET | Refills: 3 | Status: SHIPPED | OUTPATIENT
Start: 2021-12-17 | End: 2022-11-11

## 2021-12-22 DIAGNOSIS — E11.9 TYPE 2 DIABETES MELLITUS WITHOUT COMPLICATION: ICD-10-CM

## 2022-01-04 RX ORDER — TRAZODONE HYDROCHLORIDE 50 MG/1
TABLET ORAL
Qty: 60 TABLET | Refills: 11 | Status: SHIPPED | OUTPATIENT
Start: 2022-01-04 | End: 2022-01-10

## 2022-01-04 NOTE — TELEPHONE ENCOUNTER
This Rx Request does not qualify for assessment with the OR.    Please review protocol details and the Care Due Message for extra clinical information    Reasons Rx Request may be deferred:  Labs/Vitals overdue  Labs/Vitals abnormal  Patient has been seen in the ED/Hospital since the last PCP visit  Medication is non-delegated  Provider is a non-participating provider

## 2022-01-04 NOTE — TELEPHONE ENCOUNTER
No new care gaps identified.  Powered by JoMaJa by [employer]. Reference number: [id number].   1/04/2022 1:21:17 PM CST

## 2022-01-10 ENCOUNTER — LAB VISIT (OUTPATIENT)
Dept: LAB | Facility: HOSPITAL | Age: 67
End: 2022-01-10
Attending: INTERNAL MEDICINE
Payer: MEDICARE

## 2022-01-10 ENCOUNTER — OFFICE VISIT (OUTPATIENT)
Dept: INTERNAL MEDICINE | Facility: CLINIC | Age: 67
End: 2022-01-10
Payer: MEDICARE

## 2022-01-10 VITALS
BODY MASS INDEX: 28.7 KG/M2 | SYSTOLIC BLOOD PRESSURE: 122 MMHG | HEIGHT: 66 IN | DIASTOLIC BLOOD PRESSURE: 64 MMHG | WEIGHT: 178.56 LBS

## 2022-01-10 DIAGNOSIS — Z90.5 S/P NEPHRECTOMY: ICD-10-CM

## 2022-01-10 DIAGNOSIS — E11.42 TYPE 2 DIABETES MELLITUS WITH DIABETIC POLYNEUROPATHY, WITHOUT LONG-TERM CURRENT USE OF INSULIN: ICD-10-CM

## 2022-01-10 DIAGNOSIS — I48.0 PAROXYSMAL ATRIAL FIBRILLATION: ICD-10-CM

## 2022-01-10 DIAGNOSIS — Z12.5 PROSTATE CANCER SCREENING: ICD-10-CM

## 2022-01-10 DIAGNOSIS — F10.11 H/O ETOH ABUSE: ICD-10-CM

## 2022-01-10 DIAGNOSIS — E78.5 HYPERLIPIDEMIA, UNSPECIFIED HYPERLIPIDEMIA TYPE: ICD-10-CM

## 2022-01-10 DIAGNOSIS — Z85.528 H/O RENAL CELL CANCER: ICD-10-CM

## 2022-01-10 DIAGNOSIS — F41.9 ANXIETY: Primary | ICD-10-CM

## 2022-01-10 DIAGNOSIS — F10.20 ALCOHOL USE DISORDER, MODERATE, DEPENDENCE: ICD-10-CM

## 2022-01-10 DIAGNOSIS — M1A.09X0 IDIOPATHIC CHRONIC GOUT OF MULTIPLE SITES WITHOUT TOPHUS: ICD-10-CM

## 2022-01-10 LAB
ALBUMIN SERPL BCP-MCNC: 4.1 G/DL (ref 3.5–5.2)
ALP SERPL-CCNC: 56 U/L (ref 55–135)
ALT SERPL W/O P-5'-P-CCNC: 11 U/L (ref 10–44)
ANION GAP SERPL CALC-SCNC: 10 MMOL/L (ref 8–16)
AST SERPL-CCNC: 22 U/L (ref 10–40)
BILIRUB SERPL-MCNC: 0.5 MG/DL (ref 0.1–1)
BUN SERPL-MCNC: 9 MG/DL (ref 8–23)
CALCIUM SERPL-MCNC: 10 MG/DL (ref 8.7–10.5)
CHLORIDE SERPL-SCNC: 100 MMOL/L (ref 95–110)
CHOLEST SERPL-MCNC: 125 MG/DL (ref 120–199)
CHOLEST/HDLC SERPL: 4.2 {RATIO} (ref 2–5)
CO2 SERPL-SCNC: 28 MMOL/L (ref 23–29)
CREAT SERPL-MCNC: 0.9 MG/DL (ref 0.5–1.4)
EST. GFR  (AFRICAN AMERICAN): >60 ML/MIN/1.73 M^2
EST. GFR  (NON AFRICAN AMERICAN): >60 ML/MIN/1.73 M^2
ESTIMATED AVG GLUCOSE: 111 MG/DL (ref 68–131)
GLUCOSE SERPL-MCNC: 130 MG/DL (ref 70–110)
HBA1C MFR BLD: 5.5 % (ref 4–5.6)
HDLC SERPL-MCNC: 30 MG/DL (ref 40–75)
HDLC SERPL: 24 % (ref 20–50)
LDLC SERPL CALC-MCNC: 79.6 MG/DL (ref 63–159)
NONHDLC SERPL-MCNC: 95 MG/DL
POTASSIUM SERPL-SCNC: 3.8 MMOL/L (ref 3.5–5.1)
PROT SERPL-MCNC: 7.2 G/DL (ref 6–8.4)
SODIUM SERPL-SCNC: 138 MMOL/L (ref 136–145)
TRIGL SERPL-MCNC: 77 MG/DL (ref 30–150)

## 2022-01-10 PROCEDURE — 3078F PR MOST RECENT DIASTOLIC BLOOD PRESSURE < 80 MM HG: ICD-10-PCS | Mod: CPTII,S$GLB,, | Performed by: INTERNAL MEDICINE

## 2022-01-10 PROCEDURE — 99215 OFFICE O/P EST HI 40 MIN: CPT | Mod: S$GLB,,, | Performed by: INTERNAL MEDICINE

## 2022-01-10 PROCEDURE — 3044F HG A1C LEVEL LT 7.0%: CPT | Mod: CPTII,S$GLB,, | Performed by: INTERNAL MEDICINE

## 2022-01-10 PROCEDURE — 3008F PR BODY MASS INDEX (BMI) DOCUMENTED: ICD-10-PCS | Mod: CPTII,S$GLB,, | Performed by: INTERNAL MEDICINE

## 2022-01-10 PROCEDURE — 80061 LIPID PANEL: CPT | Performed by: INTERNAL MEDICINE

## 2022-01-10 PROCEDURE — 3072F LOW RISK FOR RETINOPATHY: CPT | Mod: CPTII,S$GLB,, | Performed by: INTERNAL MEDICINE

## 2022-01-10 PROCEDURE — 1126F AMNT PAIN NOTED NONE PRSNT: CPT | Mod: CPTII,S$GLB,, | Performed by: INTERNAL MEDICINE

## 2022-01-10 PROCEDURE — 83036 HEMOGLOBIN GLYCOSYLATED A1C: CPT | Performed by: INTERNAL MEDICINE

## 2022-01-10 PROCEDURE — 99999 PR PBB SHADOW E&M-EST. PATIENT-LVL IV: ICD-10-PCS | Mod: PBBFAC,,, | Performed by: INTERNAL MEDICINE

## 2022-01-10 PROCEDURE — 1126F PR PAIN SEVERITY QUANTIFIED, NO PAIN PRESENT: ICD-10-PCS | Mod: CPTII,S$GLB,, | Performed by: INTERNAL MEDICINE

## 2022-01-10 PROCEDURE — 1101F PR PT FALLS ASSESS DOC 0-1 FALLS W/OUT INJ PAST YR: ICD-10-PCS | Mod: CPTII,S$GLB,, | Performed by: INTERNAL MEDICINE

## 2022-01-10 PROCEDURE — 3074F SYST BP LT 130 MM HG: CPT | Mod: CPTII,S$GLB,, | Performed by: INTERNAL MEDICINE

## 2022-01-10 PROCEDURE — 3072F PR LOW RISK FOR RETINOPATHY: ICD-10-PCS | Mod: CPTII,S$GLB,, | Performed by: INTERNAL MEDICINE

## 2022-01-10 PROCEDURE — 3078F DIAST BP <80 MM HG: CPT | Mod: CPTII,S$GLB,, | Performed by: INTERNAL MEDICINE

## 2022-01-10 PROCEDURE — 3008F BODY MASS INDEX DOCD: CPT | Mod: CPTII,S$GLB,, | Performed by: INTERNAL MEDICINE

## 2022-01-10 PROCEDURE — G0008 ADMIN INFLUENZA VIRUS VAC: HCPCS | Mod: S$GLB,,, | Performed by: INTERNAL MEDICINE

## 2022-01-10 PROCEDURE — 1159F PR MEDICATION LIST DOCUMENTED IN MEDICAL RECORD: ICD-10-PCS | Mod: CPTII,S$GLB,, | Performed by: INTERNAL MEDICINE

## 2022-01-10 PROCEDURE — 3044F PR MOST RECENT HEMOGLOBIN A1C LEVEL <7.0%: ICD-10-PCS | Mod: CPTII,S$GLB,, | Performed by: INTERNAL MEDICINE

## 2022-01-10 PROCEDURE — 99499 RISK ADDL DX/OHS AUDIT: ICD-10-PCS | Mod: S$GLB,,, | Performed by: INTERNAL MEDICINE

## 2022-01-10 PROCEDURE — 80053 COMPREHEN METABOLIC PANEL: CPT | Performed by: INTERNAL MEDICINE

## 2022-01-10 PROCEDURE — 1160F PR REVIEW ALL MEDS BY PRESCRIBER/CLIN PHARMACIST DOCUMENTED: ICD-10-PCS | Mod: CPTII,S$GLB,, | Performed by: INTERNAL MEDICINE

## 2022-01-10 PROCEDURE — 1101F PT FALLS ASSESS-DOCD LE1/YR: CPT | Mod: CPTII,S$GLB,, | Performed by: INTERNAL MEDICINE

## 2022-01-10 PROCEDURE — G0008 FLU VACCINE - QUADRIVALENT - ADJUVANTED: ICD-10-PCS | Mod: S$GLB,,, | Performed by: INTERNAL MEDICINE

## 2022-01-10 PROCEDURE — 3288F FALL RISK ASSESSMENT DOCD: CPT | Mod: CPTII,S$GLB,, | Performed by: INTERNAL MEDICINE

## 2022-01-10 PROCEDURE — 99215 PR OFFICE/OUTPT VISIT, EST, LEVL V, 40-54 MIN: ICD-10-PCS | Mod: S$GLB,,, | Performed by: INTERNAL MEDICINE

## 2022-01-10 PROCEDURE — 99999 PR PBB SHADOW E&M-EST. PATIENT-LVL IV: CPT | Mod: PBBFAC,,, | Performed by: INTERNAL MEDICINE

## 2022-01-10 PROCEDURE — 3288F PR FALLS RISK ASSESSMENT DOCUMENTED: ICD-10-PCS | Mod: CPTII,S$GLB,, | Performed by: INTERNAL MEDICINE

## 2022-01-10 PROCEDURE — 1159F MED LIST DOCD IN RCRD: CPT | Mod: CPTII,S$GLB,, | Performed by: INTERNAL MEDICINE

## 2022-01-10 PROCEDURE — 99499 UNLISTED E&M SERVICE: CPT | Mod: S$GLB,,, | Performed by: INTERNAL MEDICINE

## 2022-01-10 PROCEDURE — 90694 VACC AIIV4 NO PRSRV 0.5ML IM: CPT | Mod: S$GLB,,, | Performed by: INTERNAL MEDICINE

## 2022-01-10 PROCEDURE — 1160F RVW MEDS BY RX/DR IN RCRD: CPT | Mod: CPTII,S$GLB,, | Performed by: INTERNAL MEDICINE

## 2022-01-10 PROCEDURE — 90694 FLU VACCINE - QUADRIVALENT - ADJUVANTED: ICD-10-PCS | Mod: S$GLB,,, | Performed by: INTERNAL MEDICINE

## 2022-01-10 PROCEDURE — 3074F PR MOST RECENT SYSTOLIC BLOOD PRESSURE < 130 MM HG: ICD-10-PCS | Mod: CPTII,S$GLB,, | Performed by: INTERNAL MEDICINE

## 2022-01-10 PROCEDURE — 36415 COLL VENOUS BLD VENIPUNCTURE: CPT | Performed by: INTERNAL MEDICINE

## 2022-01-11 ENCOUNTER — IMMUNIZATION (OUTPATIENT)
Dept: INTERNAL MEDICINE | Facility: CLINIC | Age: 67
End: 2022-01-11
Payer: MEDICARE

## 2022-01-11 ENCOUNTER — TELEPHONE (OUTPATIENT)
Dept: INTERNAL MEDICINE | Facility: CLINIC | Age: 67
End: 2022-01-11

## 2022-01-11 NOTE — TELEPHONE ENCOUNTER
----- Message from Bacilio Larry MD sent at 1/10/2022  8:14 PM CST -----  HIs diabetes is well controlled. His triglycerides are normal. His blood tests looked great. Please schedule a follow up appointment with me in one month.

## 2022-01-11 NOTE — TELEPHONE ENCOUNTER
----- Message from Bacilio Larry MD sent at 1/11/2022  5:29 AM CST -----  Regarding: Blod test , referral  Please see if a PSA can be added to blood tests from yesterday. If not, please contact patient to schedule. It is not a fasting test. Also, he needs to follow up with Urology. H has a h/o renal cell cancer and has not seen Urology for this since 2019. A referral is in.

## 2022-01-11 NOTE — PROGRESS NOTES
CC:  Hospital follow-up     HPI:  The patient is a 66-year-old male with PAF, non insulin-dependent diabetes, reflux, gout, BPH, hyperlipidemia, anxiety, renal cell cancer status post nephrectomy and alcohol abuse who presents today for hospital follow-up.  Patient had stopped drinking alcohol approximately 2 years the restarted.  He was made [facility] and was transferred to [facility].  He was admitted to [facility] on November 15 and discharged on December 31st.  He is currently on hydroxyzine t.i.d. for anxiety and a sole 50 mg. Was discharged she from [facility] states he is on 100 mg; but, patient states he is taking 15 mg. He finds that the medication makes him sleepy he takes in the morning.  He is currently taking 1 hydroxyzine in 1 Zoloft 50 mg at 4:00 p.m..  He is not scheduled a follow-up with Psychiatry.  He does not want to see anyone at [facility].  He would be willing to see someone here at [facility].    ROS:  Patient states he lost 30 lb in a put a good bit back on.  He is back up to 178 lb.  No visual changes.  He does report the years to give walked.  He does report getting dizzy on occasion when rising.  No trouble swallowing.  No chest pain.  No shortness of breath.  He just started exercise at home.  No nausea vomiting.  No abdominal pain.  No bowel changes.  No bladder changes.  No weakness in arms or legs but does report some shoulder pain on occasion.  No skin changes.  He does check his blood sugars anywhere from 1 to 3 times a day.  Sugars range anywhere from .  He was last seen by Urology for follow-up of renal cell cancer in 2019. His last PSA was in 2017.    Physical exam:  General appearance:  No acute distress  HEENT:  Conjunctiva is clear.  Pupils equal reactive.  TMs are clear.  He has slight wax in both ear canals.  Nasal septum is midline without discharge.  Oropharynx.  Trachea is midline without JVD or thyromegaly.  Pulmonary:  Good inspiratory,  expiratory breath sounds are heard.  His lungs clear to auscultation.  Cardiovascular:  S1-S2, rhythm is normal.  Extremities without edema.  GI:  Abdomen was nontender, nondistended without hepatosplenomegaly  Comments:  His recent hospitalization was discussed with him.  The patient reports he is not drinking alcohol currently.    Assessment:  1. Alcohol abuse currently alcohol free  2. Anxiety  3. Hyperlipidemia  4. Paroxysmal atrial fibrillation  5. Non insulin-dependent diabetes  6. Renal cell cancer status post nephrectomy    Plan:  1. Will refer the patient to Psychiatry  2. Put patient for an A1c and lipid panel    Addendum:  1. Will plan referral to see Urology as well as a PSA.

## 2022-01-26 NOTE — TELEPHONE ENCOUNTER
No new care gaps identified.  Powered by ShipHawk by Let[employer]. Reference number: [id number].   1/26/2022 5:28:44 AM CST

## 2022-02-07 NOTE — TELEPHONE ENCOUNTER
No new care gaps identified.  Powered by T[employer]iaPad by [employer]. Reference number: [id number].   2/07/2022 1:07:37 PM CST

## 2022-02-07 NOTE — TELEPHONE ENCOUNTER
----- Message from Rasheeda Esposito sent at 2/7/2022  1:00 PM CST -----  Regarding: Call back  Contact: [phone]  Type:  Patient Call Back    Who Called:pt  What this is regarding?:refill sertraline (ZOLOFT) 50 MG tablet 30 tablet   Would the patient rather a call back or a response via [facility]? Call back  Best Call Back Number:[phone]  Additional Information:   [id number] [place] [facility] [address]  AT [facility] & [address]  [facility] [place] [address]  Phone: [phone] Fax: [phone]  Advised to call back directly if there are further questions, or if these symptoms fail to improve as anticipated or worsen.

## 2022-02-08 ENCOUNTER — TELEPHONE (OUTPATIENT)
Dept: INTERNAL MEDICINE | Facility: CLINIC | Age: 67
End: 2022-02-08
Payer: MEDICARE

## 2022-02-08 RX ORDER — SERTRALINE HYDROCHLORIDE 50 MG/1
50 TABLET, FILM COATED ORAL DAILY
Qty: 30 TABLET | Refills: 11 | Status: SHIPPED | OUTPATIENT
Start: 2022-02-08 | End: 2022-02-10

## 2022-02-08 NOTE — TELEPHONE ENCOUNTER
----- Message from Trena Albertelza sent at 2/8/2022  9:45 AM CST -----  Contact: Patient @ [phone]  Requesting an RX refill or new RX.  Is this a refill or new RX:   RX name and strength sertraline (ZOLOFT) 50 MG tablet  Is this a 30 day or 90 day RX:   Pharmacy name and phone #[id number] - [place] - [address]  AT [facility] [address]  The doctors have asked that we provide their patients with the following 2 reminders -- prescription refills can take up to 72 hours, and a friendly reminder that in the future you can use your [facility] account to request refills: Yes    Comment: Patient has an appointment with David He for 03/25/2022 but would like Rx to hold him over. Patient also would like Dr Larry to call him

## 2022-02-09 ENCOUNTER — OFFICE VISIT (OUTPATIENT)
Dept: CARDIOLOGY | Facility: CLINIC | Age: 67
End: 2022-02-09
Payer: MEDICARE

## 2022-02-09 VITALS
DIASTOLIC BLOOD PRESSURE: 81 MMHG | SYSTOLIC BLOOD PRESSURE: 132 MMHG | OXYGEN SATURATION: 98 % | WEIGHT: 185.44 LBS | HEART RATE: 60 BPM | BODY MASS INDEX: 29.8 KG/M2 | HEIGHT: 66 IN

## 2022-02-09 DIAGNOSIS — E11.42 TYPE 2 DIABETES MELLITUS WITH DIABETIC POLYNEUROPATHY, WITHOUT LONG-TERM CURRENT USE OF INSULIN: ICD-10-CM

## 2022-02-09 DIAGNOSIS — I70.0 AORTIC ATHEROSCLEROSIS: ICD-10-CM

## 2022-02-09 DIAGNOSIS — I48.0 PAROXYSMAL ATRIAL FIBRILLATION: Primary | ICD-10-CM

## 2022-02-09 DIAGNOSIS — E11.69 HYPERLIPIDEMIA ASSOCIATED WITH TYPE 2 DIABETES MELLITUS: ICD-10-CM

## 2022-02-09 DIAGNOSIS — I48.3 TYPICAL ATRIAL FLUTTER: ICD-10-CM

## 2022-02-09 DIAGNOSIS — E78.5 HYPERLIPIDEMIA ASSOCIATED WITH TYPE 2 DIABETES MELLITUS: ICD-10-CM

## 2022-02-09 PROCEDURE — 3075F SYST BP GE 130 - 139MM HG: CPT | Mod: CPTII,S$GLB,, | Performed by: INTERNAL MEDICINE

## 2022-02-09 PROCEDURE — 99214 PR OFFICE/OUTPT VISIT, EST, LEVL IV, 30-39 MIN: ICD-10-PCS | Mod: S$GLB,,, | Performed by: INTERNAL MEDICINE

## 2022-02-09 PROCEDURE — 99999 PR PBB SHADOW E&M-EST. PATIENT-LVL V: ICD-10-PCS | Mod: PBBFAC,,, | Performed by: INTERNAL MEDICINE

## 2022-02-09 PROCEDURE — 1126F PR PAIN SEVERITY QUANTIFIED, NO PAIN PRESENT: ICD-10-PCS | Mod: CPTII,S$GLB,, | Performed by: INTERNAL MEDICINE

## 2022-02-09 PROCEDURE — 1126F AMNT PAIN NOTED NONE PRSNT: CPT | Mod: CPTII,S$GLB,, | Performed by: INTERNAL MEDICINE

## 2022-02-09 PROCEDURE — 99499 RISK ADDL DX/OHS AUDIT: ICD-10-PCS | Mod: S$GLB,,, | Performed by: INTERNAL MEDICINE

## 2022-02-09 PROCEDURE — 1101F PR PT FALLS ASSESS DOC 0-1 FALLS W/OUT INJ PAST YR: ICD-10-PCS | Mod: CPTII,S$GLB,, | Performed by: INTERNAL MEDICINE

## 2022-02-09 PROCEDURE — 1159F PR MEDICATION LIST DOCUMENTED IN MEDICAL RECORD: ICD-10-PCS | Mod: CPTII,S$GLB,, | Performed by: INTERNAL MEDICINE

## 2022-02-09 PROCEDURE — 1159F MED LIST DOCD IN RCRD: CPT | Mod: CPTII,S$GLB,, | Performed by: INTERNAL MEDICINE

## 2022-02-09 PROCEDURE — 1101F PT FALLS ASSESS-DOCD LE1/YR: CPT | Mod: CPTII,S$GLB,, | Performed by: INTERNAL MEDICINE

## 2022-02-09 PROCEDURE — 93000 EKG 12-LEAD: ICD-10-PCS | Mod: S$GLB,,, | Performed by: INTERNAL MEDICINE

## 2022-02-09 PROCEDURE — 99214 OFFICE O/P EST MOD 30 MIN: CPT | Mod: S$GLB,,, | Performed by: INTERNAL MEDICINE

## 2022-02-09 PROCEDURE — 3072F LOW RISK FOR RETINOPATHY: CPT | Mod: CPTII,S$GLB,, | Performed by: INTERNAL MEDICINE

## 2022-02-09 PROCEDURE — 3075F PR MOST RECENT SYSTOLIC BLOOD PRESS GE 130-139MM HG: ICD-10-PCS | Mod: CPTII,S$GLB,, | Performed by: INTERNAL MEDICINE

## 2022-02-09 PROCEDURE — 3288F PR FALLS RISK ASSESSMENT DOCUMENTED: ICD-10-PCS | Mod: CPTII,S$GLB,, | Performed by: INTERNAL MEDICINE

## 2022-02-09 PROCEDURE — 3079F PR MOST RECENT DIASTOLIC BLOOD PRESSURE 80-89 MM HG: ICD-10-PCS | Mod: CPTII,S$GLB,, | Performed by: INTERNAL MEDICINE

## 2022-02-09 PROCEDURE — 3008F BODY MASS INDEX DOCD: CPT | Mod: CPTII,S$GLB,, | Performed by: INTERNAL MEDICINE

## 2022-02-09 PROCEDURE — 93000 ELECTROCARDIOGRAM COMPLETE: CPT | Mod: S$GLB,,, | Performed by: INTERNAL MEDICINE

## 2022-02-09 PROCEDURE — 3044F HG A1C LEVEL LT 7.0%: CPT | Mod: CPTII,S$GLB,, | Performed by: INTERNAL MEDICINE

## 2022-02-09 PROCEDURE — 3288F FALL RISK ASSESSMENT DOCD: CPT | Mod: CPTII,S$GLB,, | Performed by: INTERNAL MEDICINE

## 2022-02-09 PROCEDURE — 3072F PR LOW RISK FOR RETINOPATHY: ICD-10-PCS | Mod: CPTII,S$GLB,, | Performed by: INTERNAL MEDICINE

## 2022-02-09 PROCEDURE — 99999 PR PBB SHADOW E&M-EST. PATIENT-LVL V: CPT | Mod: PBBFAC,,, | Performed by: INTERNAL MEDICINE

## 2022-02-09 PROCEDURE — 99499 UNLISTED E&M SERVICE: CPT | Mod: S$GLB,,, | Performed by: INTERNAL MEDICINE

## 2022-02-09 PROCEDURE — 3008F PR BODY MASS INDEX (BMI) DOCUMENTED: ICD-10-PCS | Mod: CPTII,S$GLB,, | Performed by: INTERNAL MEDICINE

## 2022-02-09 PROCEDURE — 3079F DIAST BP 80-89 MM HG: CPT | Mod: CPTII,S$GLB,, | Performed by: INTERNAL MEDICINE

## 2022-02-09 PROCEDURE — 3044F PR MOST RECENT HEMOGLOBIN A1C LEVEL <7.0%: ICD-10-PCS | Mod: CPTII,S$GLB,, | Performed by: INTERNAL MEDICINE

## 2022-02-09 RX ORDER — METOPROLOL SUCCINATE 25 MG/1
25 TABLET, EXTENDED RELEASE ORAL DAILY
Qty: 30 TABLET | Refills: 11 | Status: SHIPPED | OUTPATIENT
Start: 2022-02-09 | End: 2023-03-27

## 2022-02-09 RX ORDER — PANTOPRAZOLE SODIUM 40 MG/1
TABLET, DELAYED RELEASE ORAL
Qty: 90 TABLET | Refills: 3 | OUTPATIENT
Start: 2022-02-09

## 2022-02-09 NOTE — PROGRESS NOTES
Subjective:   Chief Complaint: Follow-up, Annual Exam, and Dizziness  Last Clinic Visit: 7/14/2020     History of Present Illness: Donald Warren Abadie is a 66 y.o. gentleman with paroxysmal atrial fibrillation, alcohol abuse, hypertension, hypertriglyceridemia, elevated LFTs, status post total knee replacement, who comes in for follow-up.  Interval History:  He was doing reasonably well up until last week when he began to have low blood pressure, and unclear heart rate.  Also endorsed generalized constitutional symptoms, malaise, fatigue, fever to 101, mild cough, and some shortness of breath.  Denies being tested for COVID.  Also some lightheadedness, denies any orthopnea, occasional chest pain, decreased p.o. intake, no bleeding, no change in bowel habits.  Has fever has resolved, and some of his other symptoms are improving, but he continues to report fatigue, dyspnea on exertion.  On my exam today noted to be mildly tachycardic and irregular.  He has changed from p.r.n. flecainide when we saw him last to 100 b.i.d. unclear when he did this, continues to take Eliquis compliant with this.  Stopped metoprolol for again unclear reasons.      7/14/2020  Since we saw him last we check cholesterol, and LDL nicely down to 50s on Crestor.  CMP with AST ALT within normal limits.  FibroScan by outside provider with steatosis.  He reports that atrial fibrillation improving, now down to only one episode a month, episodes triggered by gas and iced tea, relieved by belching.  He was taking flecainide and metoprolol daily, but now taking p.r.n..  Always takes metoprolol prior to taking flecainide, only able to take one/2 metoprolol due to bradycardia.  Continuing to not drink.  Eliquis was noted to be expensive, but still prefers this over warfarin.  1/15/2020   He has been doing reasonably well since we saw him last 5 months ago, still reports occasional paroxysmal atrial fibrillation, attributes it to when he forgets to take  medications.  D He still takes metoprolol intermittently, afraid to take more due to bradycardia in the past.  Discussed the synergistic role of metoprolol with flecainide today.  Denies any focal neurological deficits.  He is doing better from a rehab standpoint of his right knee, tells the story today of some scar tissue breaking on the front of his knee when he jumped off of his truck, and knee has been moving better since then.    He also reports stopping fenofibrate for elevated triglycerides per our discussion.  Accidentally stopped Crestor the same time, has only been on Crestor back for one week  8/2/2019  He had an episodewhere he felt his blood pressure being elevated, and took several metoprolol, on top of diltiazem which we had recently started, and ultimately developed hypotension was admitted to the ER found to have bradycardia, and then had hypotensive PEA cardiac arrest, quick return of sinus rhythm,  It was felt that arrest was in the setting of multiple medications, alcohol.  Since that time he has stopped drinkin No recurrent cardiac arrest.  AFib burden has dramatically decreased now that he no longer drinks.  Medication-wise he states he is currently taking flecainide 100 b.i.d. along with metoprolol p.r.n. with episodes of AFib.  He has an episode of AFib every few weeks.   We decreased his Crestor from 20 mg to 10 mg with an LDL of 11 and some mild transaminitis, repeat lipids in June showed that LDL went from 11 to 110.  No chest pain.  3/4/2019  He has a several year history of paroxysmal atrial fibrillation, very symptomatic, with palpitations, tachycardia, and has been seen in the ER in the past for it.  He is followed with Dr. Giang who he saw last year, and discussed antiarrhythmic therapy versus ablation, elected to pursue antiarrhythmic therapy given severe symptomatic nature of atrial fibrillation at that time.  Also with borderline age, elected to pursue anticoagulation, and  currently on apixaban 5 b.i.d., he reports tolerating anticoagulation well, denies any falls, no bleeding, no hematemesis, no hematochezia.  Given alcohol abuse, discussed treating this prior to ablation.  From atrial fibrillation standpoint he reports approximately 1 episode a month, which last anywhere from 15 min to several hours.  He currently is on flecainide 100 b.i.d., and reports compliance with this medication, but also is on metoprolol succinate 25 daily, and only reports intermittent compliance with this medication due to fatigue.  In addition to succinate 25 daily he also has a prescription for 25 tartrate p.r.n., which he takes when he goes into episodes of atrial fibrillation.     He reports a persistent difficult to control hypertriglyceridemia, and currently on Crestor 20 which was recently increased as well as Lovaza and fenofibrate.  Most recent LDL 11.    He previously has discussed detox therapy with Psychiatry, however they elected for inpatient rehab given cardiac issues, and he declined.    PMHx:  Paroxysmal Atrial Fibrillation  EtOH abuse  Hypertriglyceridemia  Elevated LFTs  Osteoarthritis status post TKR    Medications:  Current Outpatient Medications on File Prior to Visit   Medication Sig    allopurinoL (ZYLOPRIM) 100 MG tablet Take 2 tablets (200 mg total) by mouth once daily.    cyanocobalamin (VITAMIN B-12) 1000 MCG tablet Take 1 tablet (1,000 mcg total) by mouth once daily.    diclofenac sodium (VOLTAREN) 1 % Gel Apply 2 g topically once daily.    ELIQUIS 5 mg Tab TAKE 1 TABLET(5 MG) BY MOUTH TWICE DAILY    flecainide (TAMBOCOR) 100 MG Tab TAKE 1 TABLET(100 MG) BY MOUTH EVERY 12 HOURS AS NEEDED    folic acid (FOLVITE) 1 MG tablet Take 1 tablet (1 mg total) by mouth once daily.    hydrOXYzine HCL (ATARAX) 25 MG tablet Take 1 tablet (25 mg total) by mouth 3 (three) times daily as needed for Anxiety.    JANUVIA 100 mg Tab TAKE 1 TABLET(100 MG) BY MOUTH EVERY DAY    meclizine  "(ANTIVERT) 25 mg tablet Take 1 tablet (25 mg total) by mouth 2 (two) times daily as needed.    metFORMIN (GLUCOPHAGE-XR) 500 MG ER 24hr tablet TAKE 2 TABLETS(1000 MG) BY MOUTH TWICE DAILY WITH MEALS    multivitamin (THERAGRAN) tablet Take 1 tablet by mouth once daily.    multivitamin-minerals-lutein (MULTIVITAMIN 50 PLUS) Tab Take 1 tablet by mouth once daily.    omega-3 acid ethyl esters (LOVAZA) 1 gram capsule Take 2 g by mouth 2 (two) times daily.    ondansetron (ZOFRAN) 8 MG tablet Take 1 tablet (8 mg total) by mouth every 8 (eight) hours as needed for Nausea.    pantoprazole (PROTONIX) 40 MG tablet TAKE 1 TABLET BY MOUTH EVERY DAY    rOPINIRole (REQUIP) 1 MG tablet TAKE 1 TABLET(1 MG) BY MOUTH EVERY EVENING    rosuvastatin (CRESTOR) 20 MG tablet TAKE 1 TABLET(20 MG) BY MOUTH EVERY DAY    sertraline (ZOLOFT) 50 MG tablet Take 1 tablet (50 mg total) by mouth once daily.    tamsulosin (FLOMAX) 0.4 mg Cap TAKE 1 CAPSULE(0.4 MG) BY MOUTH EVERY DAY    thiamine 100 MG tablet Take 1 tablet (100 mg total) by mouth once daily.     No current facility-administered medications on file prior to visit.     Social History:  Kleber reports that he has never smoked. He has never used smokeless tobacco. He reports previous alcohol use. He reports that he does not use drugs.  Retired  Objective:   /81 (BP Location: Left arm, Patient Position: Sitting, BP Method: Medium (Automatic))   Pulse 60   Ht 5' 6" (1.676 m)   Wt 84.1 kg (185 lb 6.5 oz)   SpO2 98%   BMI 29.93 kg/m²     Physical Exam  Constitutional:       General: He is not in acute distress.     Appearance: He is not diaphoretic.   HENT:      Head: Normocephalic and atraumatic.      Mouth/Throat:      Pharynx: No oropharyngeal exudate.   Eyes:      General: No scleral icterus.  Neck:      Thyroid: No thyromegaly.      Vascular: No JVD.      Trachea: No tracheal deviation.   Cardiovascular:      Heart sounds: No murmur heard.  No friction rub. No " gallop.       Comments: Tachycardic, irregularly irregular  Pulmonary:      Effort: Pulmonary effort is normal. No respiratory distress.      Breath sounds: Normal breath sounds. No wheezing or rales.   Chest:      Chest wall: No tenderness.   Abdominal:      General: There is no distension.      Palpations: Abdomen is soft.      Tenderness: There is no abdominal tenderness. There is no guarding or rebound.   Musculoskeletal:      Comments: Incision noted over right knee from prior knee replacement, no surrounding erythema.   Skin:     General: Skin is warm and dry.      Findings: No erythema.      Comments: Facial plethora   Neurological:      Mental Status: He is alert and oriented to person, place, and time.   Psychiatric:         Mood and Affect: Affect normal.       EKG:  My independent visualization of most recent EKG is normal sinus rhythm, nonspecific ST changes, borderline left atrial enlargement    TTE:  10/12/2018  CONCLUSIONS     1 - Normal left ventricular systolic function (EF 60-65%).     2 - Biatrial enlargement.     3 - No wall motion abnormalities.     4 - Quantitatively measured LV function is 67%.     5 - Indeterminate LV diastolic function.     6 - Normal right ventricular systolic function .     7 - The estimated PA systolic pressure is greater than 24 mmHg.     8 - Mild tricuspid regurgitation.     03/15/2019  · Low normal left ventricular systolic function. The estimated ejection fraction is 50%  · Concentric left ventricular remodeling.  · Septal wall has abnormal motion.  · RV was not well visualized  · Mild tricuspid regurgitation.  · Atrial fibrillation observed.     Lipids:  Recent Labs   Lab 01/10/22  1003   LDL Cholesterol 79.6   HDL 30 L   Cholesterol 125      Holter 08/01/2018  PREDOMINANT RHYTHM  1. Sinus rhythm with heart rates varying between 44 and 82 bpm with an average of 55 bpm.     VENTRICULAR ARRHYTHMIAS  1. There were very rare PVCs recorded totalling 41 and averaging less  than 1 per hour.     2. There were no episodes of ventricular tachycardia.    SUPRA VENTRICULAR ARRHYTHMIAS  1. There were very rare PACs recorded totalling 15 and averaging less than 1 per hour.  There were 3 couplets.    2. There were no episodes of sustained supraventricular tachycardia.        Assessment:     1. Paroxysmal atrial fibrillation    2. Typical atrial flutter    3. Hyperlipidemia associated with type 2 diabetes mellitus    4. Aortic atherosclerosis    5. Type 2 diabetes mellitus with diabetic polyneuropathy, without long-term current use of insulin        Plan:   1. Paroxysmal atrial fibrillation  Again has changed his medical therapy since we saw him last now taking scheduled flecainide 100 twice daily unclear when he did this, or medical decision making in between.  Also stops taking beta-blockade again unclear when or why this happened.    Starting back on beta-blockade,    EKG performed today in clinic shows AFib with RVR.    Will discuss with our electrophysiology colleagues for potential cardioversion, followed by either up titration of flecainide, alternative antiarrhythmic, or PVI, appears to be fairly symptomatic.  - IN OFFICE EKG 12-LEAD (to Muse)  - metoprolol succinate (TOPROL-XL) 25 MG 24 hr tablet; Take 1 tablet (25 mg total) by mouth once daily.  Dispense: 30 tablet; Refill: 11    2. Typical atrial flutter  Most recent LDL primary prevention 79 well controlled continue statin.    3. Hyperlipidemia associated with type 2 diabetes mellitus      4. Aortic atherosclerosis      5. Type 2 diabetes mellitus with diabetic polyneuropathy, without long-term current use of insulin      Six months

## 2022-02-10 ENCOUNTER — TELEPHONE (OUTPATIENT)
Dept: ELECTROPHYSIOLOGY | Facility: CLINIC | Age: 67
End: 2022-02-10
Payer: MEDICARE

## 2022-02-10 NOTE — TELEPHONE ENCOUNTER
Attempted to contact patient to discuss scheduling Cardioversion procedure, but no answer received. Voicemail left requesting return call .

## 2022-02-22 ENCOUNTER — OFFICE VISIT (OUTPATIENT)
Dept: RHEUMATOLOGY | Facility: CLINIC | Age: 67
End: 2022-02-22
Payer: MEDICARE

## 2022-02-22 VITALS
DIASTOLIC BLOOD PRESSURE: 77 MMHG | BODY MASS INDEX: 32.13 KG/M2 | HEART RATE: 67 BPM | SYSTOLIC BLOOD PRESSURE: 137 MMHG | WEIGHT: 199.06 LBS

## 2022-02-22 DIAGNOSIS — M15.9 PRIMARY OSTEOARTHRITIS INVOLVING MULTIPLE JOINTS: ICD-10-CM

## 2022-02-22 DIAGNOSIS — G89.29 CHRONIC LEFT SHOULDER PAIN: Primary | ICD-10-CM

## 2022-02-22 DIAGNOSIS — M1A.09X0 IDIOPATHIC CHRONIC GOUT OF MULTIPLE SITES WITHOUT TOPHUS: ICD-10-CM

## 2022-02-22 DIAGNOSIS — M25.512 CHRONIC LEFT SHOULDER PAIN: Primary | ICD-10-CM

## 2022-02-22 PROCEDURE — 99213 OFFICE O/P EST LOW 20 MIN: CPT | Mod: 25,S$GLB,, | Performed by: INTERNAL MEDICINE

## 2022-02-22 PROCEDURE — 99999 PR PBB SHADOW E&M-EST. PATIENT-LVL IV: ICD-10-PCS | Mod: PBBFAC,,, | Performed by: INTERNAL MEDICINE

## 2022-02-22 PROCEDURE — 3078F PR MOST RECENT DIASTOLIC BLOOD PRESSURE < 80 MM HG: ICD-10-PCS | Mod: CPTII,S$GLB,, | Performed by: INTERNAL MEDICINE

## 2022-02-22 PROCEDURE — 20610 LARGE JOINT ASPIRATION/INJECTION: L SUBACROMIAL BURSA: ICD-10-PCS | Mod: LT,S$GLB,, | Performed by: INTERNAL MEDICINE

## 2022-02-22 PROCEDURE — 99999 PR PBB SHADOW E&M-EST. PATIENT-LVL IV: CPT | Mod: PBBFAC,,, | Performed by: INTERNAL MEDICINE

## 2022-02-22 PROCEDURE — 3008F BODY MASS INDEX DOCD: CPT | Mod: CPTII,S$GLB,, | Performed by: INTERNAL MEDICINE

## 2022-02-22 PROCEDURE — 3072F LOW RISK FOR RETINOPATHY: CPT | Mod: CPTII,S$GLB,, | Performed by: INTERNAL MEDICINE

## 2022-02-22 PROCEDURE — 1125F AMNT PAIN NOTED PAIN PRSNT: CPT | Mod: CPTII,S$GLB,, | Performed by: INTERNAL MEDICINE

## 2022-02-22 PROCEDURE — 3075F SYST BP GE 130 - 139MM HG: CPT | Mod: CPTII,S$GLB,, | Performed by: INTERNAL MEDICINE

## 2022-02-22 PROCEDURE — 3008F PR BODY MASS INDEX (BMI) DOCUMENTED: ICD-10-PCS | Mod: CPTII,S$GLB,, | Performed by: INTERNAL MEDICINE

## 2022-02-22 PROCEDURE — 1159F PR MEDICATION LIST DOCUMENTED IN MEDICAL RECORD: ICD-10-PCS | Mod: CPTII,S$GLB,, | Performed by: INTERNAL MEDICINE

## 2022-02-22 PROCEDURE — 3075F PR MOST RECENT SYSTOLIC BLOOD PRESS GE 130-139MM HG: ICD-10-PCS | Mod: CPTII,S$GLB,, | Performed by: INTERNAL MEDICINE

## 2022-02-22 PROCEDURE — 1125F PR PAIN SEVERITY QUANTIFIED, PAIN PRESENT: ICD-10-PCS | Mod: CPTII,S$GLB,, | Performed by: INTERNAL MEDICINE

## 2022-02-22 PROCEDURE — 3072F PR LOW RISK FOR RETINOPATHY: ICD-10-PCS | Mod: CPTII,S$GLB,, | Performed by: INTERNAL MEDICINE

## 2022-02-22 PROCEDURE — 1160F PR REVIEW ALL MEDS BY PRESCRIBER/CLIN PHARMACIST DOCUMENTED: ICD-10-PCS | Mod: CPTII,S$GLB,, | Performed by: INTERNAL MEDICINE

## 2022-02-22 PROCEDURE — 3044F PR MOST RECENT HEMOGLOBIN A1C LEVEL <7.0%: ICD-10-PCS | Mod: CPTII,S$GLB,, | Performed by: INTERNAL MEDICINE

## 2022-02-22 PROCEDURE — 1160F RVW MEDS BY RX/DR IN RCRD: CPT | Mod: CPTII,S$GLB,, | Performed by: INTERNAL MEDICINE

## 2022-02-22 PROCEDURE — 99213 PR OFFICE/OUTPT VISIT, EST, LEVL III, 20-29 MIN: ICD-10-PCS | Mod: 25,S$GLB,, | Performed by: INTERNAL MEDICINE

## 2022-02-22 PROCEDURE — 1159F MED LIST DOCD IN RCRD: CPT | Mod: CPTII,S$GLB,, | Performed by: INTERNAL MEDICINE

## 2022-02-22 PROCEDURE — 20610 DRAIN/INJ JOINT/BURSA W/O US: CPT | Mod: LT,S$GLB,, | Performed by: INTERNAL MEDICINE

## 2022-02-22 PROCEDURE — 3078F DIAST BP <80 MM HG: CPT | Mod: CPTII,S$GLB,, | Performed by: INTERNAL MEDICINE

## 2022-02-22 PROCEDURE — 3044F HG A1C LEVEL LT 7.0%: CPT | Mod: CPTII,S$GLB,, | Performed by: INTERNAL MEDICINE

## 2022-02-22 RX ADMIN — TRIAMCINOLONE ACETONIDE 40 MG: 40 INJECTION, SUSPENSION INTRA-ARTICULAR; INTRAMUSCULAR at 02:02

## 2022-02-22 NOTE — PROCEDURES
Large Joint Aspiration/Injection: L subacromial bursa    Date/Time: 2/22/2022 2:00 PM  Performed by: Tomas Sanz MD  Authorized by: Tomas Sanz MD     Consent Done?:  Yes (Verbal)  Indications:  Pain  Site marked: the procedure site was marked    Prep: patient was prepped and draped in usual sterile fashion      Local anesthesia used?: Yes    Anesthesia:  Local infiltration  Local anesthetic:  Lidocaine 1% without epinephrine  Anesthetic total (ml):  2      Details:  Needle Size:  25 G  Approach:  Lateral  Location:  Shoulder  Site:  L subacromial bursa  Medications:  40 mg triamcinolone acetonide 40 mg/mL  Patient tolerance:  Patient tolerated the procedure well with no immediate complications

## 2022-02-23 RX ORDER — TRIAMCINOLONE ACETONIDE 40 MG/ML
40 INJECTION, SUSPENSION INTRA-ARTICULAR; INTRAMUSCULAR
Status: DISCONTINUED | OUTPATIENT
Start: 2022-02-22 | End: 2022-02-23 | Stop reason: HOSPADM

## 2022-02-23 NOTE — PROGRESS NOTES
History of present illness:  66-year-old gentleman has a history of crystal proven gout which dates back to 1998.  I have been following him since 2006.  He remains on allopurinol 200 mg daily.  He has had no recent gout attacks.  He has no history of tophi.     I saw him in June was having problems with his left shoulder.  It had been going on for 8 months.  He had been going to physical therapy, but this did not help.  I injected from the clavicular joint.  This helped until 2 months ago.  He is having increased pain since that time.  He had no swelling.  He has had some pain right shoulder as well, but not as bad.  The pain does not radiate.  He has not tried heat, ice, or topical medications    Physical examination:  Musculoskeletal:  He has pain on range of motion of shoulders bilaterally, worse on left than on right.  He is diffusely tender across the left shoulder girdle, primarily the subacromial bursa.    Assessment:  Shoulder pain due to osteoarthritis and rotator cuff pathology    Plans:  1. I will see how he responds to the injection  2. I recommend the use of Tylenol  3. I recommend the use of Voltaren gel  4. He is to keep his appointment in June foot his gout follow-up

## 2022-03-09 NOTE — TELEPHONE ENCOUNTER
No new care gaps identified.  Powered by [employer]. Reference number: [id number].   3/09/2022 5:29:11 AM CST

## 2022-03-10 RX ORDER — HYDROXYZINE HYDROCHLORIDE 25 MG/1
25 TABLET, FILM COATED ORAL 3 TIMES DAILY PRN
Qty: 60 TABLET | Refills: 6 | Status: SHIPPED | OUTPATIENT
Start: 2022-03-10 | End: 2023-01-18

## 2022-03-10 NOTE — TELEPHONE ENCOUNTER
----- Message from Ludmila Corado sent at 3/10/2022 11:08 AM CST -----  Regarding: refill  Contact: patient [phone]  Requesting an RX refill or new RX.  Is this a refill or new RX: refill  RX name and strength (hydrOXYzine HCL (ATARAX) 25 MG tablet   Is this a 30 day or 90 day RX: 90  Pharmacy name and phone #    [facility] #[id number] - [place] - [address]  AT [facility] [address]  [address] [place] [address]  Phone: [phone] Fax: [phone]      The doctors have asked that we provide their patients with the following 2 reminders -- prescription refills can take up to 72 hours, and a friendly reminder that in the future you can use your [facility] account to request refills: yes

## 2022-03-10 NOTE — TELEPHONE ENCOUNTER
This Rx Request does not qualify for assessment with the OR   Please review protocol details and the Care Due Message for extra clinical information    Reasons Rx Request may be deferred:  Labs/Vitals overdue  Labs/Vitals abnormal  Patient has been seen in the ED/Hospital since the last PCP visit  Medication is non-delegated  Medication associated diagnosis code is outside of scope  Provider is a non-participating provider  Visit criteria not met (Patient needs to be seen at least every 15 months by PCP)    Note composed:12:38 PM 03/10/2022

## 2022-03-14 DIAGNOSIS — E11.9 TYPE 2 DIABETES MELLITUS WITHOUT COMPLICATION: ICD-10-CM

## 2022-03-16 RX ORDER — TAMSULOSIN HYDROCHLORIDE 0.4 MG/1
CAPSULE ORAL
Qty: 90 CAPSULE | Refills: 3 | Status: SHIPPED | OUTPATIENT
Start: 2022-03-16 | End: 2023-03-26

## 2022-03-17 NOTE — TELEPHONE ENCOUNTER
Refill Authorization Note   Donald Abadie  is requesting a refill authorization.  Brief Assessment and Rationale for Refill:  Approve     Medication Therapy Plan:       Medication Reconciliation Completed: No   Comments:   --->Care Gap information included below if applicable.   Orders Placed This Encounter    tamsulosin (FLOMAX) 0.4 mg Cap      Requested Prescriptions   Signed Prescriptions Disp Refills    tamsulosin (FLOMAX) 0.4 mg Cap 90 capsule 3     Sig: TAKE 1 CAPSULE(0.4 MG) BY MOUTH EVERY DAY       Urology: Alpha-Adrenergic Blocker Passed - 3/9/2022  5:28 AM        Passed - Patient is at least 18 years old        Passed - Prostate Cancer is not on problem list        Passed - BP > 90/50 mmH     BP Readings from Last 3 Encounters:   02/22/22 137/77   02/09/22 132/81   01/10/22 122/64               Passed - Valid encounter within last 15 months     Recent Visits  Date Type Provider Dept   01/10/22 Office Visit Bacilio Larry MD [facility]   05/06/21 Office Visit Bacilio Larry MD [facility]   10/27/20 Office Visit Bacilio Larry MD [facility]   09/29/20 Office Visit Bacilio Larry MD [facility]   Showing recent visits within past 720 days and meeting all other requirements  Future Appointments  No visits were found meeting these conditions.  Showing future appointments within next 150 days and meeting all other requirements      Future Appointments              In 3 months [facility] Sanz [facility]                    Appointments  past 12m or future 3m with PCP    Date Provider   Last Visit   1/10/2022 Bacilio Larry MD   Next Visit   3/10/2022 Bacilio Larry MD   ED visits in past 90 days: 0     Note composed:10:17 PM 03/16/2022

## 2022-03-28 ENCOUNTER — TELEPHONE (OUTPATIENT)
Dept: INTERNAL MEDICINE | Facility: CLINIC | Age: 67
End: 2022-03-28
Payer: MEDICARE

## 2022-03-28 ENCOUNTER — TELEPHONE (OUTPATIENT)
Dept: OPTOMETRY | Facility: CLINIC | Age: 67
End: 2022-03-28
Payer: MEDICARE

## 2022-03-28 ENCOUNTER — TELEPHONE (OUTPATIENT)
Dept: PSYCHIATRY | Facility: CLINIC | Age: 67
End: 2022-03-28
Payer: MEDICARE

## 2022-03-28 NOTE — TELEPHONE ENCOUNTER
----- Message from Emily Eller sent at 3/28/2022  3:38 PM CDT -----  Regarding: Medical Assistance  Contact: Patient  Patient is requesting a call back regarding appt on 04/20/2022   Patient stated he would like to be seen sooner due to anxiety   Patient stated medication needs to be adjusted due to symptoms   Please Assist     Patient can be reached at [phone]

## 2022-03-28 NOTE — TELEPHONE ENCOUNTER
----- Message from Lenora Johnson sent at 3/28/2022  2:27 PM CDT -----  Contact: Self [phone]  Patient would like to get medical advice.    Would you like a call back, or a response through your [facility] portal?:  call back     Comments:   Calling to r/s missed appt.

## 2022-03-28 NOTE — TELEPHONE ENCOUNTER
Attempt to contact the patient regarding the message below. No answer LVM requesting a call back

## 2022-03-28 NOTE — TELEPHONE ENCOUNTER
----- Message from Kimber Magallon OD sent at 3/28/2022  2:31 PM CDT -----  Regarding: FW: appt  Please call pt to schedule him in May thanks  ----- Message -----  From: Barbara Vizcaino  Sent: 3/28/2022   2:21 PM CDT  To: [facility] Staff  Subject: appt                                             Patient is calling to make his annual appointment.  I was unable to schedule in Epic, because it gave me nothing to choose for the rest of the year.    Kleber  @  [phone]

## 2022-03-29 ENCOUNTER — TELEPHONE (OUTPATIENT)
Dept: INTERNAL MEDICINE | Facility: CLINIC | Age: 67
End: 2022-03-29
Payer: MEDICARE

## 2022-03-29 NOTE — TELEPHONE ENCOUNTER
----- Message from Antonio Teresa sent at 3/29/2022  1:15 PM CDT -----  Contact: [phone]  Kleber is requesting a callback from the nurse.    Callback number: [phone]

## 2022-03-30 ENCOUNTER — OFFICE VISIT (OUTPATIENT)
Dept: INTERNAL MEDICINE | Facility: CLINIC | Age: 67
End: 2022-03-30
Payer: MEDICARE

## 2022-03-30 VITALS
HEART RATE: 70 BPM | HEIGHT: 66 IN | SYSTOLIC BLOOD PRESSURE: 110 MMHG | BODY MASS INDEX: 30.11 KG/M2 | WEIGHT: 187.38 LBS | DIASTOLIC BLOOD PRESSURE: 66 MMHG | OXYGEN SATURATION: 95 %

## 2022-03-30 DIAGNOSIS — F41.9 ANXIETY: Primary | ICD-10-CM

## 2022-03-30 PROCEDURE — 3078F PR MOST RECENT DIASTOLIC BLOOD PRESSURE < 80 MM HG: ICD-10-PCS | Mod: CPTII,S$GLB,, | Performed by: INTERNAL MEDICINE

## 2022-03-30 PROCEDURE — 99999 PR PBB SHADOW E&M-EST. PATIENT-LVL V: CPT | Mod: PBBFAC,,, | Performed by: INTERNAL MEDICINE

## 2022-03-30 PROCEDURE — 1101F PT FALLS ASSESS-DOCD LE1/YR: CPT | Mod: CPTII,S$GLB,, | Performed by: INTERNAL MEDICINE

## 2022-03-30 PROCEDURE — 1159F MED LIST DOCD IN RCRD: CPT | Mod: CPTII,S$GLB,, | Performed by: INTERNAL MEDICINE

## 2022-03-30 PROCEDURE — 1126F PR PAIN SEVERITY QUANTIFIED, NO PAIN PRESENT: ICD-10-PCS | Mod: CPTII,S$GLB,, | Performed by: INTERNAL MEDICINE

## 2022-03-30 PROCEDURE — 3074F PR MOST RECENT SYSTOLIC BLOOD PRESSURE < 130 MM HG: ICD-10-PCS | Mod: CPTII,S$GLB,, | Performed by: INTERNAL MEDICINE

## 2022-03-30 PROCEDURE — 3074F SYST BP LT 130 MM HG: CPT | Mod: CPTII,S$GLB,, | Performed by: INTERNAL MEDICINE

## 2022-03-30 PROCEDURE — 99999 PR PBB SHADOW E&M-EST. PATIENT-LVL V: ICD-10-PCS | Mod: PBBFAC,,, | Performed by: INTERNAL MEDICINE

## 2022-03-30 PROCEDURE — 99214 OFFICE O/P EST MOD 30 MIN: CPT | Mod: S$GLB,,, | Performed by: INTERNAL MEDICINE

## 2022-03-30 PROCEDURE — 3008F BODY MASS INDEX DOCD: CPT | Mod: CPTII,S$GLB,, | Performed by: INTERNAL MEDICINE

## 2022-03-30 PROCEDURE — 3072F PR LOW RISK FOR RETINOPATHY: ICD-10-PCS | Mod: CPTII,S$GLB,, | Performed by: INTERNAL MEDICINE

## 2022-03-30 PROCEDURE — 3008F PR BODY MASS INDEX (BMI) DOCUMENTED: ICD-10-PCS | Mod: CPTII,S$GLB,, | Performed by: INTERNAL MEDICINE

## 2022-03-30 PROCEDURE — 1159F PR MEDICATION LIST DOCUMENTED IN MEDICAL RECORD: ICD-10-PCS | Mod: CPTII,S$GLB,, | Performed by: INTERNAL MEDICINE

## 2022-03-30 PROCEDURE — 1101F PR PT FALLS ASSESS DOC 0-1 FALLS W/OUT INJ PAST YR: ICD-10-PCS | Mod: CPTII,S$GLB,, | Performed by: INTERNAL MEDICINE

## 2022-03-30 PROCEDURE — 3078F DIAST BP <80 MM HG: CPT | Mod: CPTII,S$GLB,, | Performed by: INTERNAL MEDICINE

## 2022-03-30 PROCEDURE — 3072F LOW RISK FOR RETINOPATHY: CPT | Mod: CPTII,S$GLB,, | Performed by: INTERNAL MEDICINE

## 2022-03-30 PROCEDURE — 1126F AMNT PAIN NOTED NONE PRSNT: CPT | Mod: CPTII,S$GLB,, | Performed by: INTERNAL MEDICINE

## 2022-03-30 PROCEDURE — 3288F PR FALLS RISK ASSESSMENT DOCUMENTED: ICD-10-PCS | Mod: CPTII,S$GLB,, | Performed by: INTERNAL MEDICINE

## 2022-03-30 PROCEDURE — 3044F HG A1C LEVEL LT 7.0%: CPT | Mod: CPTII,S$GLB,, | Performed by: INTERNAL MEDICINE

## 2022-03-30 PROCEDURE — 99214 PR OFFICE/OUTPT VISIT, EST, LEVL IV, 30-39 MIN: ICD-10-PCS | Mod: S$GLB,,, | Performed by: INTERNAL MEDICINE

## 2022-03-30 PROCEDURE — 3288F FALL RISK ASSESSMENT DOCD: CPT | Mod: CPTII,S$GLB,, | Performed by: INTERNAL MEDICINE

## 2022-03-30 PROCEDURE — 3044F PR MOST RECENT HEMOGLOBIN A1C LEVEL <7.0%: ICD-10-PCS | Mod: CPTII,S$GLB,, | Performed by: INTERNAL MEDICINE

## 2022-03-30 RX ORDER — CLONAZEPAM 0.5 MG/1
0.5 TABLET ORAL 2 TIMES DAILY PRN
Qty: 10 TABLET | Refills: 0 | Status: SHIPPED | OUTPATIENT
Start: 2022-03-30 | End: 2022-03-30 | Stop reason: SDUPTHER

## 2022-03-30 RX ORDER — CLONAZEPAM 0.5 MG/1
0.5 TABLET ORAL DAILY PRN
Qty: 10 TABLET | Refills: 0 | Status: SHIPPED | OUTPATIENT
Start: 2022-03-30 | End: 2022-04-21

## 2022-03-30 NOTE — PROGRESS NOTES
CC:  Anxiety    HPI:  The patient is a 66-year-old male with PAF, non insulin-dependent diabetes, reflux, gout, BPH, hyperlipidemia, anxiety, renal cell cancer status post nephrectomy, and ETOH abuse who presents today for anxiety.  The patient reports that he is having anxiety issues.  He is not taking Zoloft.  He found it did not help.  He is taking Atarax 3 times a day which helps; but, he still has episodes of extreme anxiety.  He would like to get a prescription of lorazepam.  The patient was referred to Psychiatry but missed his appointment last week.  He would like to reschedule this.    ROS:  Patient reports no suicidal or homicidal ideations.  He states that he cut back on his metoprolol to half a tablet a day.  Whole tablet along with flecainide lower his heart rate too much.    Physical exam:  General appearance:  No acute distress  Pulmonary:  Good inspiratory, expiratory breath sounds are heard.  Lungs clear auscultation.  Cardiovascular:  S1-S2, rhythm is normal.  Extremities without edema  GI:  Abdomen is nontender, nondistended without hepatosplenomegaly  Comments:  20-30 minutes was spent discussion with the patient regards to his anxiety and medications.  We will refer the patient to psychiatry for medication management    Assessment:  1. Anxiety  2. PAF  3. Non in spent diabetes  4. ETOH abuse    Plan:  1. Refer the patient back to Psychiatry  2. Will discontinue Zoloft since he is no longer taking it  3. The patient to continue with Atarax t.i.d.  4. Will prescribe 10 tablets of clonazepam.  He can take 1 a day for severe anxiety and till he sees Psychiatry.

## 2022-04-07 ENCOUNTER — PATIENT MESSAGE (OUTPATIENT)
Dept: RHEUMATOLOGY | Facility: CLINIC | Age: 67
End: 2022-04-07
Payer: MEDICARE

## 2022-04-20 DIAGNOSIS — F41.9 ANXIETY: ICD-10-CM

## 2022-04-20 NOTE — TELEPHONE ENCOUNTER
No new care gaps identified.  Powered by [employer]. Reference number: [id number].   4/20/2022 7:55:01 AM CDT

## 2022-04-21 ENCOUNTER — TELEPHONE (OUTPATIENT)
Dept: INTERNAL MEDICINE | Facility: CLINIC | Age: 67
End: 2022-04-21
Payer: MEDICARE

## 2022-04-21 RX ORDER — CLONAZEPAM 0.5 MG/1
TABLET ORAL
Qty: 10 TABLET | Refills: 0 | Status: SHIPPED | OUTPATIENT
Start: 2022-04-21 | End: 2022-04-27 | Stop reason: SDUPTHER

## 2022-04-21 NOTE — TELEPHONE ENCOUNTER
----- Message from Jossue Etienne sent at 4/21/2022 12:45 PM CDT -----  Contact: [phone]  Pt is calling in, he is needing a call back, he got a call and needs to know what its about, thanks

## 2022-04-21 NOTE — TELEPHONE ENCOUNTER
----- Message from Magda Hunt sent at 4/21/2022  1:20 PM CDT -----  Contact: [phone]  Patient is returning a phone call.  Who left a message for the patient: davie  Does patient know what this is regarding: yes   Would you like a call back, or a response through your [facility] portal?:   call shaun  Comments:

## 2022-04-21 NOTE — TELEPHONE ENCOUNTER
----- Message from Magda Hunt sent at 4/21/2022  1:36 PM CDT -----  Contact: [phone]  I saw the message in regards to the person Sheila he was asking for but he is now also asking for Dr Larry to call him in regards to his medications

## 2022-04-21 NOTE — TELEPHONE ENCOUNTER
----- Message from Magda Hunt sent at 4/21/2022  1:59 PM CDT -----  Contact: [phone]  Pt states he missed a call from the office please advise and give return call

## 2022-04-23 DIAGNOSIS — F41.9 ANXIETY: Primary | ICD-10-CM

## 2022-04-27 ENCOUNTER — TELEPHONE (OUTPATIENT)
Dept: INTERNAL MEDICINE | Facility: CLINIC | Age: 67
End: 2022-04-27
Payer: MEDICARE

## 2022-04-27 DIAGNOSIS — F41.9 ANXIETY: ICD-10-CM

## 2022-04-27 RX ORDER — CLONAZEPAM 0.5 MG/1
0.5 TABLET ORAL DAILY PRN
Qty: 10 TABLET | Refills: 0 | Status: SHIPPED | OUTPATIENT
Start: 2022-04-27 | End: 2022-06-16

## 2022-04-27 NOTE — TELEPHONE ENCOUNTER
----- Message from Cheli Owens sent at 4/27/2022  2:20 PM CDT -----  Contact: Self   Requesting an RX refill or new RX.  Is this a refill or new RX:   RX name and strength (copy/paste from chart):  clonazePAM (KLONOPIN) 0.5 MG tablet  Is this a 30 day or 90 day RX:  short fill till appt on 5/13 with psych  Pharmacy name and phone # (copy/paste from chart):      [id number] - [place] - [address]  [facility] [address]  [address] [place] [address]  Phone: [phone] Fax: [phone]       The doctors have asked that we provide their patients with the following 2 reminders -- prescription refills can take up to 72 hours, and a friendly reminder that in the future you can use your [facility] account to request refills: yes

## 2022-04-27 NOTE — TELEPHONE ENCOUNTER
Pt stating that he need more than 10 clonazepam pills to last until his 05/13/2022 Psych appt.    short fill till appt on 5/13 with psych       Please advise,  Thank You.

## 2022-05-13 ENCOUNTER — OFFICE VISIT (OUTPATIENT)
Dept: PSYCHIATRY | Facility: CLINIC | Age: 67
End: 2022-05-13
Payer: MEDICARE

## 2022-05-13 VITALS
WEIGHT: 180 LBS | DIASTOLIC BLOOD PRESSURE: 56 MMHG | BODY MASS INDEX: 29.05 KG/M2 | SYSTOLIC BLOOD PRESSURE: 102 MMHG | HEART RATE: 73 BPM

## 2022-05-13 DIAGNOSIS — F10.20 ALCOHOL USE DISORDER, MODERATE, DEPENDENCE: ICD-10-CM

## 2022-05-13 DIAGNOSIS — F41.1 GENERALIZED ANXIETY DISORDER: Primary | ICD-10-CM

## 2022-05-13 DIAGNOSIS — F41.0 PANIC ATTACKS: ICD-10-CM

## 2022-05-13 PROCEDURE — 3044F HG A1C LEVEL LT 7.0%: CPT | Mod: CPTII,S$GLB,, | Performed by: PHYSICIAN ASSISTANT

## 2022-05-13 PROCEDURE — 90792 PR PSYCHIATRIC DIAGNOSTIC EVALUATION W/MEDICAL SERVICES: ICD-10-PCS | Mod: S$GLB,,, | Performed by: PHYSICIAN ASSISTANT

## 2022-05-13 PROCEDURE — 3008F PR BODY MASS INDEX (BMI) DOCUMENTED: ICD-10-PCS | Mod: CPTII,S$GLB,, | Performed by: PHYSICIAN ASSISTANT

## 2022-05-13 PROCEDURE — 3078F PR MOST RECENT DIASTOLIC BLOOD PRESSURE < 80 MM HG: ICD-10-PCS | Mod: CPTII,S$GLB,, | Performed by: PHYSICIAN ASSISTANT

## 2022-05-13 PROCEDURE — 3078F DIAST BP <80 MM HG: CPT | Mod: CPTII,S$GLB,, | Performed by: PHYSICIAN ASSISTANT

## 2022-05-13 PROCEDURE — 3044F PR MOST RECENT HEMOGLOBIN A1C LEVEL <7.0%: ICD-10-PCS | Mod: CPTII,S$GLB,, | Performed by: PHYSICIAN ASSISTANT

## 2022-05-13 PROCEDURE — 90792 PSYCH DIAG EVAL W/MED SRVCS: CPT | Mod: S$GLB,,, | Performed by: PHYSICIAN ASSISTANT

## 2022-05-13 PROCEDURE — 3072F PR LOW RISK FOR RETINOPATHY: ICD-10-PCS | Mod: CPTII,S$GLB,, | Performed by: PHYSICIAN ASSISTANT

## 2022-05-13 PROCEDURE — 3074F SYST BP LT 130 MM HG: CPT | Mod: CPTII,S$GLB,, | Performed by: PHYSICIAN ASSISTANT

## 2022-05-13 PROCEDURE — 99999 PR PBB SHADOW E&M-EST. PATIENT-LVL II: ICD-10-PCS | Mod: PBBFAC,,, | Performed by: PHYSICIAN ASSISTANT

## 2022-05-13 PROCEDURE — 99999 PR PBB SHADOW E&M-EST. PATIENT-LVL II: CPT | Mod: PBBFAC,,, | Performed by: PHYSICIAN ASSISTANT

## 2022-05-13 PROCEDURE — 3074F PR MOST RECENT SYSTOLIC BLOOD PRESSURE < 130 MM HG: ICD-10-PCS | Mod: CPTII,S$GLB,, | Performed by: PHYSICIAN ASSISTANT

## 2022-05-13 PROCEDURE — 3008F BODY MASS INDEX DOCD: CPT | Mod: CPTII,S$GLB,, | Performed by: PHYSICIAN ASSISTANT

## 2022-05-13 PROCEDURE — 3072F LOW RISK FOR RETINOPATHY: CPT | Mod: CPTII,S$GLB,, | Performed by: PHYSICIAN ASSISTANT

## 2022-05-13 RX ORDER — NALTREXONE HYDROCHLORIDE 50 MG/1
50 TABLET, FILM COATED ORAL DAILY
Qty: 90 TABLET | Refills: 2 | Status: SHIPPED | OUTPATIENT
Start: 2022-05-13 | End: 2024-02-29

## 2022-05-13 RX ORDER — LORAZEPAM 1 MG/1
0.5 TABLET ORAL NIGHTLY PRN
Qty: 15 TABLET | Refills: 0 | Status: SHIPPED | OUTPATIENT
Start: 2022-05-13 | End: 2022-06-16

## 2022-05-13 RX ORDER — VENLAFAXINE HYDROCHLORIDE 37.5 MG/1
37.5 CAPSULE, EXTENDED RELEASE ORAL DAILY
Qty: 90 CAPSULE | Refills: 2 | Status: SHIPPED | OUTPATIENT
Start: 2022-05-13 | End: 2022-06-16

## 2022-05-13 NOTE — PROGRESS NOTES
Outpatient Psychiatry Initial Visit (MD/JOSE DE JESUS)    5/13/2022    Donald Warren Abadie, a 66 y.o. male, presenting for initial evaluation visit. Met with patient.    Reason for Encounter: Referral from PCP. Patient complains of   Chief Complaint   Patient presents with    Anxiety         History of Present Illness:    SUBJECTIVE:   Psych Interview 05/13/2022:   Donald Warren Abadie is a 66 y.o. male with past psychiatric history of generalized anxiety disorder, alcohol dependence presented to for initial evaluation and treatment for alcohol abuse, worsening anxiety with panic.    Patient last seen in clinic by Dr. hardin in 2019 was placed on BuSpar for anxiety.  Patient's PCP had prescribed small amount of clonazepam and referred to Psychiatry as having significant anxiety that did not respond to Zoloft trial and also alcohol abuse with relapse 2 years ago, currently drinking 6-8 beers per day.  Patient has been also taking hydroxyzine as needed for anxiety which he says does have some mild benefit.    Patient reports frequent panic attacks that are occurring only in the evening or they wake me up in the middle the night, I do not know what is causing this but I can handle it anymore.  Discussed with patient will give short supply of low-dose Ativan to using combination with hydroxyzine for panic attacks.  Discussed with patient prescription will be for small amount and not continued long-term as patient has history of alcohol abuse and dependence but at this time appears panic attacks severe enough that will require benzodiazepine.  Patient educated on benzodiazepines and possible interaction with alcohol, discussed with patient that he can not take this medication when intoxicated as these medications have significant interaction.  Patient reports understanding and agreeable to plan, states that he mostly has the panic attacks at night, discussed with patient likely could because due to alcohol withdrawal after  drinking heavily during the day.    Discussed with patient will start Effexor for management of anxiety and panic.  Discussed with plan will discontinue lorazepam at future visits.  Discussed with patient minus discontinue drinking and self taper off alcohol, patient states that in past has decreased to teddy Bateman by several beers every day or so with minimal withdrawal symptoms.    Patient states that he does attend AA meetings and has been going recently due to relapse and increasing drinking.  Patient appears to have good insight and appears motivated to seek legitimate treatment for anxiety.  I do not want to drink anymore, but when all this anxiety started and I started having these racing thoughts I could not handle it and started drinking again.    Discussed with patient restarting naltrexone which she has been on the past for alcohol abuse discussed with patient could later use Vivitrol but at this time will use PL naltrexone to see if patient tolerates.  Patient is agreeable to plan      Hx 0 prior psychiatric hospitalizations. Hx 0 suicide attempts.      Patient reports if family history significant for alcoholism, states has multiple siblings with alcohol abuse issues.    Pt denies hx trauma. Denies physical/sexual abuse. Pt denies symptoms including nightmares, hypervigilance, flashbacks, avoidance behaviors, and disassociation.    Pt reports currently taking clonazepam 0.5 mg b.i.d. p.r.n. severe anxiety from PCP, will discontinue this medication as a shorter acting benzo for panic attacks is prefer will due to current alcohol use.  Patient also currently taking hydroxyzine p.r.n. anxiety    Patients mood is anxious, affect appears mood congruent. Linear and logical, friendly and cooperative, good eye contact.    Reports sleeping 5-7 hrs per night but states now is frequently waking up suddenly and the night with panic attacks, and decreased appetite.     Denies SI/HI/AVH. Denies side effects of  medications.    The patient complained of depressed mood with lethargy, decreased appetite, anhedonia, apathy, worsening self-esteem, guilt, decreased concentration and ability to make decisions.      Review Of Systems:     Review of Systems   Constitutional: Negative for fever.   HENT: Negative for sore throat.    Eyes: Negative for photophobia.   Respiratory: Negative for cough.    Cardiovascular: Negative for chest pain and palpitations.   Gastrointestinal: Negative for abdominal pain.   Genitourinary: Negative for dysuria.   Musculoskeletal: Negative for myalgias.   Skin: Negative for rash.   Neurological: Negative for dizziness.   Endo/Heme/Allergies: Does not bruise/bleed easily.   Psychiatric/Behavioral: Positive for depression. Negative for hallucinations, substance abuse and suicidal ideas. The patient is nervous/anxious. The patient does not have insomnia.        Psychiatric Review Of Systems - Is patient experiencing or having changes in:  sleep: no  appetite: yes  weight: no  energy/anergy: yes  interest/pleasure/anhedonia: yes  somatic symptoms: no  libido: no  anxiety/panic: yes  guilty/hopelessness: no  concentration: yes  S.I.B.s/risky behavior: no  Irritability: yes  Racing thoughts: yes  Impulsive behaviors: no  Paranoia: no  AVH: no    OBJECTIVE     Past Psychiatric History:   Previous therapy: yes  Previous psychiatric treatment and medication trials: yes  Previous psychiatric hospitalizations: no  Previous diagnoses: yes  Previous suicide attempts: no  History of violence: no  Currently in treatment with myself and counselor here.  Education: high school diploma/GED  Other pertinent history: None  Depression screening was performed with standardized tool: Not Screened - Not Eligible/Appropriate     Substance Abuse History:  Recreational drugs: No recreational drug use  Use of alcohol: denied  Use of caffeine: denies use  Tobacco use: no  Legal consequences of chemical use: no  Patient feels he  "ought to cut down on drinking and/or drug use: no  Patient has been annoyed by others criticizing his drinking or drug use: no  Patient has felt bad or guilty about drinking or drug use:no  Patient has had a drink or used drugs as an eye opener first thing in the morning to steady nerves, get rid of a hangover or get the day started: no  Use of OTC medications:  vitamins    Past Medical/Surgical History:   Past Medical History:   Diagnosis Date    A-fib     Alcohol abuse     Anxiety     Arthritis     Chronic gout     Diabetes mellitus     DM (diabetes mellitus) 11/16/2016    "boarderline, not taking any meds currently"    Fatty liver     Hyperlipidemia     Renal cell cancer 2014    S/P TKR (total knee replacement), right 6/27/2019     Past Surgical History:   Procedure Laterality Date    ABSCESS DRAINAGE      perirectal    COLONOSCOPY      HAND SURGERY Left     JOINT REPLACEMENT      knee replacement right knee    KIDNEY SURGERY      partial left kidney removal - [place]    PARTIAL NEPHRECTOMY Left August 2014    PERCUTANEOUS CRYOTHERAPY OF PERIPHERAL NERVE USING LIQUID NITROUS OXIDE IN CLOSED NEEDLE DEVICE Right 6/17/2019    Procedure: CRYOTHERAPY, NERVE, PERIPHERAL, PERCUTANEOUS, USING LIQUID NITROUS OXIDE IN CLOSED NEEDLE DEVICE-right knee iovera;  Surgeon: Donny Hair III, MD;  Location: [facility] CATH LAB;  Service: Pain Management;  Laterality: Right;    TOTAL KNEE ARTHROPLASTY Right 6/27/2019    Procedure: ARTHROPLASTY, KNEE, TOTAL-SAME DAY;  Surgeon: Mikael Huerta MD;  Location: [facility] OR [address] [place] [address];  Service: Orthopedics;  Laterality: Right;         Current Medications:   Medication List with Changes/Refills   New Medications    LORAZEPAM (ATIVAN) 1 MG TABLET    Take 0.5 tablets (0.5 mg total) by mouth nightly as needed (for panic attacks. Do not combine with alcohol).    VENLAFAXINE (EFFEXOR-XR) 37.5 MG 24 HR CAPSULE    Take 1 capsule (37.5 mg total) by mouth once daily.   Current " Medications    ALLOPURINOL (ZYLOPRIM) 100 MG TABLET    Take 2 tablets (200 mg total) by mouth once daily.    CLONAZEPAM (KLONOPIN) 0.5 MG TABLET    Take 1 tablet (0.5 mg total) by mouth daily as needed for Anxiety.    CYANOCOBALAMIN (VITAMIN B-12) 1000 MCG TABLET    Take 1 tablet (1,000 mcg total) by mouth once daily.    DICLOFENAC SODIUM (VOLTAREN) 1 % GEL    Apply 2 g topically once daily.    ELIQUIS 5 MG TAB    TAKE 1 TABLET(5 MG) BY MOUTH TWICE DAILY    FLECAINIDE (TAMBOCOR) 100 MG TAB    TAKE 1 TABLET(100 MG) BY MOUTH EVERY 12 HOURS AS NEEDED    FOLIC ACID (FOLVITE) 1 MG TABLET    Take 1 tablet (1 mg total) by mouth once daily.    HYDROXYZINE HCL (ATARAX) 25 MG TABLET    Take 1 tablet (25 mg total) by mouth 3 (three) times daily as needed for Anxiety.    JANUVIA 100 MG TAB    TAKE 1 TABLET(100 MG) BY MOUTH EVERY DAY    MECLIZINE (ANTIVERT) 25 MG TABLET    Take 1 tablet (25 mg total) by mouth 2 (two) times daily as needed.    METFORMIN (GLUCOPHAGE-XR) 500 MG ER 24HR TABLET    TAKE 2 TABLETS(1000 MG) BY MOUTH TWICE DAILY WITH MEALS    METOPROLOL SUCCINATE (TOPROL-XL) 25 MG 24 HR TABLET    Take 1 tablet (25 mg total) by mouth once daily.    MULTIVITAMIN (THERAGRAN) TABLET    Take 1 tablet by mouth once daily.    MULTIVITAMIN-MINERALS-LUTEIN (MULTIVITAMIN 50 PLUS) TAB    Take 1 tablet by mouth once daily.    OMEGA-3 ACID ETHYL ESTERS (LOVAZA) 1 GRAM CAPSULE    Take 2 g by mouth 2 (two) times daily.    ONDANSETRON (ZOFRAN) 8 MG TABLET    Take 1 tablet (8 mg total) by mouth every 8 (eight) hours as needed for Nausea.    PANTOPRAZOLE (PROTONIX) 40 MG TABLET    TAKE 1 TABLET BY MOUTH EVERY DAY    ROPINIROLE (REQUIP) 1 MG TABLET    TAKE 1 TABLET(1 MG) BY MOUTH EVERY EVENING    ROSUVASTATIN (CRESTOR) 20 MG TABLET    TAKE 1 TABLET(20 MG) BY MOUTH EVERY DAY    TAMSULOSIN (FLOMAX) 0.4 MG CAP    TAKE 1 CAPSULE(0.4 MG) BY MOUTH EVERY DAY    VITAMIN B-1 100 MG TABLET    TAKE 1 TABLET BY MOUTH ONCE DAILY   Changed and/or  Refilled Medications    Modified Medication Previous Medication    NALTREXONE (DEPADE) 50 MG TABLET naltrexone (DEPADE) 50 mg tablet       Take 50 mg by mouth once daily.    TAKE 1 TABLET(50 MG) BY MOUTH EVERY DAY   Discontinued Medications    LORAZEPAM (ATIVAN) 1 MG TABLET    Take 1 tablet (1 mg total) by mouth every 6 (six) hours as needed for Anxiety.    LORAZEPAM (ATIVAN) 1 MG TABLET    Take 1 tablet (1 mg total) by mouth every 8 (eight) hours as needed for Anxiety.       Allergies:   Review of patient's allergies indicates:   Allergen Reactions    No known drug allergies          Vitals   Vitals:    05/13/22 1256   BP: (!) 102/56   Pulse: 73        Labs/Imaging/Studies:   No results found for this or any previous visit (from the past 48 hour(s)).   No results found for: PHENYTOIN, PHENOBARB, VALPROATE, CBMZ      Nutritional Screening: Considering the patient's height and weight, medications, medical history and preferences, should a referral be made to the dietitian? no    Constitutional  Vitals:  Most recent vital signs, dated less than 90 days prior to this appointment, were reviewed.    Vitals:    05/13/22 1256   BP: (!) 102/56   Pulse: 73   Weight: 81.7 kg (180 lb 0.1 oz)        General:  unremarkable, age appropriate     Musculoskeletal  Muscle Strength/Tone:  not examined   Gait & Station:  non-ataxic       Psychiatric Mental Status Exam:  Arousal: alert  Sensorium/Orientation: oriented to grossly intact  Behavior/Cooperation: normal, friendly and cooperative, restless and fidgety , eye contact normal   Speech: normal tone, normal rate, normal pitch, normal volume  Language: grossly intact  Mood: anxious, depressed  Affect: congruent and appropriate  Thought Process: normal and logical  Thought Content:   Auditory hallucinations: NO  Visual hallucinations: NO  Paranoia: NO  Delusions:  NO  Suicidal ideation: NO  Homicidal ideation: NO  Attention/Concentration:  intact  Memory:    Recent: [facility] Recent  Memory: WNL   Remote: [facility] Remote Memory: WNL   Fund of Knowledge: Aware of current events and Intact   Intelligence: [facility] Intelligence: Average, based on history, based on vocabulary, syntax, grammar and content  Insight: {[facility] insight: Good, understanding severity of illness/history of present illness  Judgment: [facility] Judgement: Fair, per patient's behavior/history of present illness      Relevant Elements of Neurological Exam: normal gait        Laboratory Data  No visits with results within 1 Month(s) from this visit.   Latest known visit with results is:   Lab Visit on 01/10/2022   Component Date Value Ref Range Status    Sodium 01/10/2022 138  136 - 145 mmol/L Final    Potassium 01/10/2022 3.8  3.5 - 5.1 mmol/L Final    Chloride 01/10/2022 100  95 - 110 mmol/L Final    CO2 01/10/2022 28  23 - 29 mmol/L Final    Glucose 01/10/2022 130 (A) 70 - 110 mg/dL Final    BUN 01/10/2022 9  8 - 23 mg/dL Final    Creatinine 01/10/2022 0.9  0.5 - 1.4 mg/dL Final    Calcium 01/10/2022 10.0  8.7 - 10.5 mg/dL Final    Total Protein 01/10/2022 7.2  6.0 - 8.4 g/dL Final    Albumin 01/10/2022 4.1  3.5 - 5.2 g/dL Final    Total Bilirubin 01/10/2022 0.5  0.1 - 1.0 mg/dL Final    Alkaline Phosphatase 01/10/2022 56  55 - 135 U/L Final    AST 01/10/2022 22  10 - 40 U/L Final    ALT 01/10/2022 11  10 - 44 U/L Final    Anion Gap 01/10/2022 10  8 - 16 mmol/L Final    eGFR if African American 01/10/2022 >60.0  >60 mL/min/1.73 m^2 Final    eGFR if non African American 01/10/2022 >60.0  >60 mL/min/1.73 m^2 Final    Cholesterol 01/10/2022 125  120 - 199 mg/dL Final    Triglycerides 01/10/2022 77  30 - 150 mg/dL Final    HDL 01/10/2022 30 (A) 40 - 75 mg/dL Final    LDL Cholesterol 01/10/2022 79.6  63.0 - 159.0 mg/dL Final    HDL/Cholesterol Ratio 01/10/2022 24.0  20.0 - 50.0 % Final    Total Cholesterol/HDL Ratio 01/10/2022 4.2  2.0 - 5.0 Final    Non-HDL Cholesterol 01/10/2022 95  mg/dL Final    Hemoglobin A1C  01/10/2022 5.5  4.0 - 5.6 % Final    Estimated Avg Glucose 01/10/2022 111  68 - 131 mg/dL Final         Medications  Outpatient Encounter Medications as of 5/13/2022   Medication Sig Dispense Refill    allopurinoL (ZYLOPRIM) 100 MG tablet Take 2 tablets (200 mg total) by mouth once daily. 180 tablet 3    clonazePAM (KLONOPIN) 0.5 MG tablet Take 1 tablet (0.5 mg total) by mouth daily as needed for Anxiety. 10 tablet 0    cyanocobalamin (VITAMIN B-12) 1000 MCG tablet Take 1 tablet (1,000 mcg total) by mouth once daily.      diclofenac sodium (VOLTAREN) 1 % Gel Apply 2 g topically once daily. 1 Tube 3    ELIQUIS 5 mg Tab TAKE 1 TABLET(5 MG) BY MOUTH TWICE DAILY 180 tablet 3    flecainide (TAMBOCOR) 100 MG Tab TAKE 1 TABLET(100 MG) BY MOUTH EVERY 12 HOURS AS NEEDED 180 tablet 3    folic acid (FOLVITE) 1 MG tablet Take 1 tablet (1 mg total) by mouth once daily. 30 tablet 11    hydrOXYzine HCL (ATARAX) 25 MG tablet Take 1 tablet (25 mg total) by mouth 3 (three) times daily as needed for Anxiety. 60 tablet 6    JANUVIA 100 mg Tab TAKE 1 TABLET(100 MG) BY MOUTH EVERY DAY 90 tablet 3    LORazepam (ATIVAN) 1 MG tablet Take 0.5 tablets (0.5 mg total) by mouth nightly as needed (for panic attacks. Do not combine with alcohol). 15 tablet 0    meclizine (ANTIVERT) 25 mg tablet Take 1 tablet (25 mg total) by mouth 2 (two) times daily as needed. 30 tablet 1    metFORMIN (GLUCOPHAGE-XR) 500 MG ER 24hr tablet TAKE 2 TABLETS(1000 MG) BY MOUTH TWICE DAILY WITH MEALS 360 tablet 3    metoprolol succinate (TOPROL-XL) 25 MG 24 hr tablet Take 1 tablet (25 mg total) by mouth once daily. 30 tablet 11    multivitamin (THERAGRAN) tablet Take 1 tablet by mouth once daily.      multivitamin-minerals-lutein (MULTIVITAMIN 50 PLUS) Tab Take 1 tablet by mouth once daily. 30 tablet 2    naltrexone (DEPADE) 50 mg tablet Take 50 mg by mouth once daily. 90 tablet 2    omega-3 acid ethyl esters (LOVAZA) 1 gram capsule Take 2 g by mouth 2  (two) times daily.      ondansetron (ZOFRAN) 8 MG tablet Take 1 tablet (8 mg total) by mouth every 8 (eight) hours as needed for Nausea. 30 tablet 0    pantoprazole (PROTONIX) 40 MG tablet TAKE 1 TABLET BY MOUTH EVERY DAY 90 tablet 3    rOPINIRole (REQUIP) 1 MG tablet TAKE 1 TABLET(1 MG) BY MOUTH EVERY EVENING 90 tablet 3    rosuvastatin (CRESTOR) 20 MG tablet TAKE 1 TABLET(20 MG) BY MOUTH EVERY DAY 90 tablet 3    tamsulosin (FLOMAX) 0.4 mg Cap TAKE 1 CAPSULE(0.4 MG) BY MOUTH EVERY DAY 90 capsule 3    venlafaxine (EFFEXOR-XR) 37.5 MG 24 hr capsule Take 1 capsule (37.5 mg total) by mouth once daily. 90 capsule 2    VITAMIN B-1 100 MG tablet TAKE 1 TABLET BY MOUTH ONCE DAILY 30 tablet 2    [DISCONTINUED] naltrexone (DEPADE) 50 mg tablet TAKE 1 TABLET(50 MG) BY MOUTH EVERY DAY 30 tablet 2     No facility-administered encounter medications on file as of 5/13/2022.           Assessment / Plan:     Diagnosis:      ICD-10-CM ICD-9-CM   1. Generalized anxiety disorder  F41.1 300.02   2. Alcohol use disorder, moderate, dependence  F10.20 303.90   3. Panic attacks  F41.0 300.01       Strengths and Liabilities: Strength: Patient accepts guidance/feedback, Strength: Patient is expressive/articulate., Strength: Patient is intelligent., Strength: Patient is motivated for change., Strength: Patient has reasonable judgment., Liability: Patient has poor health., Liability: Patient is unstable., Liability: Patient lacks coping skills.    Treatment Goals:  Specify outcomes written in observable, behavioral terms:   Anxiety: reducing negative automatic thoughts, reducing physical symptoms of anxiety and reducing time spent worrying (<30 minutes/day)  Depression: increasing energy, increasing interest in usual activities, increasing motivation, reducing fatigue and reducing negative automatic thoughts  Drug & Alcohol: Discontinue alcohol use, use low-dose Ativan as instructed if needed for alcohol withdrawal symptoms for very  short-term use.  Continue to attend AA meetings.  Start naltrexone 50 mg daily    Treatment Plan/Recommendations:   · Medication Management: The risks and benefits of medication were discussed with the patient.  · The treatment plan and follow up plan were reviewed with the patient.  -start naltrexone 50 mg daily  -start Effexor 37.5 mg daily  -continue hydroxyzine p.r.n. anxiety as prescribed by PCP  -stop clonazepam  -start lorazepam 0.5 mg once daily as needed for panic attack.  Do not combine with alcohol as discussed at length.  Patient reports understanding and agreeable to plan      Return to Clinic: 1 month      Total face to face time: 35 min  Total time (chart review, patient contact, documentation): 45 min    Santiago He PA-C      *This note has been prepared using a combination of a dictation device and typing.  It has been checked for errors but some errors may still exist within the note as a result of speech recognition errors and/or typographical errors.

## 2022-05-16 ENCOUNTER — TELEPHONE (OUTPATIENT)
Dept: INTERNAL MEDICINE | Facility: CLINIC | Age: 67
End: 2022-05-16
Payer: MEDICARE

## 2022-05-16 DIAGNOSIS — R19.5 DARK STOOLS: Primary | ICD-10-CM

## 2022-05-16 NOTE — TELEPHONE ENCOUNTER
----- Message from Caron Hyman sent at 5/16/2022  4:05 PM CDT -----  Contact: [phone]  Pt would like a call back about getting a colon saying his stool is black for a week now. Pt wants a call back

## 2022-05-16 NOTE — TELEPHONE ENCOUNTER
----- Message from Tremontana Chevalier sent at 5/16/2022  4:16 PM CDT -----  Regarding: appt  Contact: pt @ [phone]  Pt calling to speak with someone in Dr. Larry's office to schedule colonoscopy. Pt says his stool is black. Unable to schedule in epic. Please call.

## 2022-05-17 NOTE — TELEPHONE ENCOUNTER
Lety had schedule his labs ( CBC and stool). Please let patient know that if his stool sample shows blood, he will need an EGD not a colonoscopy.

## 2022-05-18 ENCOUNTER — LAB VISIT (OUTPATIENT)
Dept: LAB | Facility: HOSPITAL | Age: 67
End: 2022-05-18
Attending: INTERNAL MEDICINE
Payer: MEDICARE

## 2022-05-18 ENCOUNTER — PATIENT MESSAGE (OUTPATIENT)
Dept: INTERNAL MEDICINE | Facility: CLINIC | Age: 67
End: 2022-05-18
Payer: MEDICARE

## 2022-05-18 DIAGNOSIS — R19.5 DARK STOOLS: ICD-10-CM

## 2022-05-18 LAB
BASOPHILS # BLD AUTO: 0.07 K/UL (ref 0–0.2)
BASOPHILS NFR BLD: 1.2 % (ref 0–1.9)
DIFFERENTIAL METHOD: ABNORMAL
EOSINOPHIL # BLD AUTO: 0 K/UL (ref 0–0.5)
EOSINOPHIL NFR BLD: 0.7 % (ref 0–8)
ERYTHROCYTE [DISTWIDTH] IN BLOOD BY AUTOMATED COUNT: 18.4 % (ref 11.5–14.5)
HCT VFR BLD AUTO: 38 % (ref 40–54)
HGB BLD-MCNC: 12.7 G/DL (ref 14–18)
IMM GRANULOCYTES # BLD AUTO: 0.04 K/UL (ref 0–0.04)
IMM GRANULOCYTES NFR BLD AUTO: 0.7 % (ref 0–0.5)
LYMPHOCYTES # BLD AUTO: 1.3 K/UL (ref 1–4.8)
LYMPHOCYTES NFR BLD: 21.4 % (ref 18–48)
MCH RBC QN AUTO: 30.8 PG (ref 27–31)
MCHC RBC AUTO-ENTMCNC: 33.4 G/DL (ref 32–36)
MCV RBC AUTO: 92 FL (ref 82–98)
MONOCYTES # BLD AUTO: 0.5 K/UL (ref 0.3–1)
MONOCYTES NFR BLD: 8.7 % (ref 4–15)
NEUTROPHILS # BLD AUTO: 4.1 K/UL (ref 1.8–7.7)
NEUTROPHILS NFR BLD: 67.3 % (ref 38–73)
NRBC BLD-RTO: 0 /100 WBC
PLATELET # BLD AUTO: 188 K/UL (ref 150–450)
PMV BLD AUTO: 8.6 FL (ref 9.2–12.9)
RBC # BLD AUTO: 4.13 M/UL (ref 4.6–6.2)
WBC # BLD AUTO: 6.08 K/UL (ref 3.9–12.7)

## 2022-05-18 PROCEDURE — 36415 COLL VENOUS BLD VENIPUNCTURE: CPT | Performed by: INTERNAL MEDICINE

## 2022-05-18 PROCEDURE — 85025 COMPLETE CBC W/AUTO DIFF WBC: CPT | Performed by: INTERNAL MEDICINE

## 2022-05-19 ENCOUNTER — LAB VISIT (OUTPATIENT)
Dept: LAB | Facility: HOSPITAL | Age: 67
End: 2022-05-19
Attending: INTERNAL MEDICINE
Payer: MEDICARE

## 2022-05-19 DIAGNOSIS — R19.5 DARK STOOLS: ICD-10-CM

## 2022-05-19 LAB — OB PNL STL: NEGATIVE

## 2022-05-19 PROCEDURE — 82272 OCCULT BLD FECES 1-3 TESTS: CPT | Performed by: INTERNAL MEDICINE

## 2022-05-20 ENCOUNTER — TELEPHONE (OUTPATIENT)
Dept: INTERNAL MEDICINE | Facility: CLINIC | Age: 67
End: 2022-05-20
Payer: MEDICARE

## 2022-05-20 NOTE — TELEPHONE ENCOUNTER
----- Message from Bacilio Larry MD sent at 5/20/2022 11:54 AM CDT -----  Please contact patient. His stool sample was negative for blood and his blood count looked good. Please see if he has been taking any iron supplements or using Pepto-Bismol. These can turn your stools black.

## 2022-06-01 ENCOUNTER — PATIENT MESSAGE (OUTPATIENT)
Dept: ADMINISTRATIVE | Facility: HOSPITAL | Age: 67
End: 2022-06-01
Payer: MEDICARE

## 2022-06-16 ENCOUNTER — OFFICE VISIT (OUTPATIENT)
Dept: PSYCHIATRY | Facility: CLINIC | Age: 67
End: 2022-06-16
Payer: MEDICARE

## 2022-06-16 VITALS
BODY MASS INDEX: 28.38 KG/M2 | WEIGHT: 175.81 LBS | DIASTOLIC BLOOD PRESSURE: 78 MMHG | HEART RATE: 75 BPM | SYSTOLIC BLOOD PRESSURE: 136 MMHG

## 2022-06-16 DIAGNOSIS — F10.11 H/O ETOH ABUSE: ICD-10-CM

## 2022-06-16 DIAGNOSIS — F41.1 GENERALIZED ANXIETY DISORDER: ICD-10-CM

## 2022-06-16 DIAGNOSIS — F41.0 PANIC ATTACKS: ICD-10-CM

## 2022-06-16 DIAGNOSIS — F41.9 ANXIETY: ICD-10-CM

## 2022-06-16 DIAGNOSIS — F10.20 ALCOHOL USE DISORDER, MODERATE, DEPENDENCE: ICD-10-CM

## 2022-06-16 DIAGNOSIS — F41.1 GAD (GENERALIZED ANXIETY DISORDER): ICD-10-CM

## 2022-06-16 PROCEDURE — 3072F LOW RISK FOR RETINOPATHY: CPT | Mod: CPTII,S$GLB,, | Performed by: PHYSICIAN ASSISTANT

## 2022-06-16 PROCEDURE — 3072F PR LOW RISK FOR RETINOPATHY: ICD-10-PCS | Mod: CPTII,S$GLB,, | Performed by: PHYSICIAN ASSISTANT

## 2022-06-16 PROCEDURE — 99999 PR PBB SHADOW E&M-EST. PATIENT-LVL III: CPT | Mod: PBBFAC,,, | Performed by: PHYSICIAN ASSISTANT

## 2022-06-16 PROCEDURE — 3008F PR BODY MASS INDEX (BMI) DOCUMENTED: ICD-10-PCS | Mod: CPTII,S$GLB,, | Performed by: PHYSICIAN ASSISTANT

## 2022-06-16 PROCEDURE — 90833 PSYTX W PT W E/M 30 MIN: CPT | Mod: S$GLB,,, | Performed by: PHYSICIAN ASSISTANT

## 2022-06-16 PROCEDURE — 3078F PR MOST RECENT DIASTOLIC BLOOD PRESSURE < 80 MM HG: ICD-10-PCS | Mod: CPTII,S$GLB,, | Performed by: PHYSICIAN ASSISTANT

## 2022-06-16 PROCEDURE — 3078F DIAST BP <80 MM HG: CPT | Mod: CPTII,S$GLB,, | Performed by: PHYSICIAN ASSISTANT

## 2022-06-16 PROCEDURE — 3044F PR MOST RECENT HEMOGLOBIN A1C LEVEL <7.0%: ICD-10-PCS | Mod: CPTII,S$GLB,, | Performed by: PHYSICIAN ASSISTANT

## 2022-06-16 PROCEDURE — 3044F HG A1C LEVEL LT 7.0%: CPT | Mod: CPTII,S$GLB,, | Performed by: PHYSICIAN ASSISTANT

## 2022-06-16 PROCEDURE — 99999 PR PBB SHADOW E&M-EST. PATIENT-LVL III: ICD-10-PCS | Mod: PBBFAC,,, | Performed by: PHYSICIAN ASSISTANT

## 2022-06-16 PROCEDURE — 90833 PR PSYCHOTHERAPY W/PATIENT W/E&M, 30 MIN (ADD ON): ICD-10-PCS | Mod: S$GLB,,, | Performed by: PHYSICIAN ASSISTANT

## 2022-06-16 PROCEDURE — 99214 OFFICE O/P EST MOD 30 MIN: CPT | Mod: S$GLB,,, | Performed by: PHYSICIAN ASSISTANT

## 2022-06-16 PROCEDURE — 3075F SYST BP GE 130 - 139MM HG: CPT | Mod: CPTII,S$GLB,, | Performed by: PHYSICIAN ASSISTANT

## 2022-06-16 PROCEDURE — 99214 PR OFFICE/OUTPT VISIT, EST, LEVL IV, 30-39 MIN: ICD-10-PCS | Mod: S$GLB,,, | Performed by: PHYSICIAN ASSISTANT

## 2022-06-16 PROCEDURE — 3008F BODY MASS INDEX DOCD: CPT | Mod: CPTII,S$GLB,, | Performed by: PHYSICIAN ASSISTANT

## 2022-06-16 PROCEDURE — 3075F PR MOST RECENT SYSTOLIC BLOOD PRESS GE 130-139MM HG: ICD-10-PCS | Mod: CPTII,S$GLB,, | Performed by: PHYSICIAN ASSISTANT

## 2022-06-16 RX ORDER — LORAZEPAM 0.5 MG/1
0.5 TABLET ORAL NIGHTLY PRN
Qty: 15 TABLET | Refills: 1 | Status: SHIPPED | OUTPATIENT
Start: 2022-06-16 | End: 2022-08-03 | Stop reason: SDUPTHER

## 2022-06-16 RX ORDER — VENLAFAXINE HYDROCHLORIDE 75 MG/1
75 CAPSULE, EXTENDED RELEASE ORAL DAILY
Qty: 90 CAPSULE | Refills: 2 | Status: ON HOLD | OUTPATIENT
Start: 2022-06-16 | End: 2023-10-29 | Stop reason: HOSPADM

## 2022-06-16 NOTE — PROGRESS NOTES
"Outpatient Psychiatry Follow-Up Visit (MD/JOSE DE JESUS)    6/16/2022    Clinical Status of Patient:  Outpatient (Ambulatory)    Chief Complaint:  Donald Warren Abadie is a 66 y.o. male who presents today for follow-up of depression, anxiety and substance problems.  Met with patient.      Interval History and Content of Current Session:  Interim Events/Subjective Report/Content of Current Session:    Pt reports today: "still drinking about 6 beers in the evening, I don't feel like my cravings to drink are as bad."    Taking the ativan in AM and vistaril prn anxiety, states sometimes using vistaril prn at night for insomnia which is effective. States only drinks in PM and has not combined ativan for panic attack with alcohol.    Mood appears much improved, states sleeping better, energy and motivation improved.    States no longer having panic attacks which were occurring which were frequently occurring at night.    "I feel more laid back, i'm not as frustrated now."    Reports has not been taking naltrexone, discussed at length, pt is agreeable to start, pt was concerned would make him very sick if drink, explained is not antabuse. States he will start medicine today.    Patients mood is steady, euthymic, affect appears mood congruent. Linear and logical, friendly and cooperative, good eye contact.    Denies SI/HI/AVH. Pt reports sleeping well and normal appetite. Denies side effects of medications.    Pt reports taking medications as prescribed and behaving appropriately during interview today.          Psychotherapy:  · Target symptoms: anxiety , substance abuse  · Why chosen therapy is appropriate versus another modality: relevant to diagnosis  · Outcome monitoring methods: self-report  · Therapeutic intervention type: insight oriented psychotherapy  · Topics discussed/themes: building skills sets for symptom management, symptom recognition, substance abuse  · The patient's response to the intervention is accepting. The " patient's progress toward treatment goals is fair.   · Duration of intervention: 20 minutes.      Prior visit 5/13/22:  Psych Interview 05/13/2022:   Donald Warren Abadie is a 66 y.o. male with past psychiatric history of generalized anxiety disorder, alcohol dependence presented to for initial evaluation and treatment for alcohol abuse, worsening anxiety with panic.     Patient last seen in clinic by Dr. hardin in 2019 was placed on BuSpar for anxiety.  Patient's PCP had prescribed small amount of clonazepam and referred to Psychiatry as having significant anxiety that did not respond to Zoloft trial and also alcohol abuse with relapse 2 years ago, currently drinking 6-8 beers per day.  Patient has been also taking hydroxyzine as needed for anxiety which he says does have some mild benefit.     Patient reports frequent panic attacks that are occurring only in the evening or they wake me up in the middle the night, I do not know what is causing this but I can handle it anymore.  Discussed with patient will give short supply of low-dose Ativan to using combination with hydroxyzine for panic attacks.  Discussed with patient prescription will be for small amount and not continued long-term as patient has history of alcohol abuse and dependence but at this time appears panic attacks severe enough that will require benzodiazepine.  Patient educated on benzodiazepines and possible interaction with alcohol, discussed with patient that he can not take this medication when intoxicated as these medications have significant interaction.  Patient reports understanding and agreeable to plan, states that he mostly has the panic attacks at night, discussed with patient likely could because due to alcohol withdrawal after drinking heavily during the day.     Discussed with patient will start Effexor for management of anxiety and panic.  Discussed with plan will discontinue lorazepam at future visits.  Discussed with patient  minus discontinue drinking and self taper off alcohol, patient states that in past has decreased to teddy Bateman by several beers every day or so with minimal withdrawal symptoms.     Patient states that he does attend AA meetings and has been going recently due to relapse and increasing drinking.  Patient appears to have good insight and appears motivated to seek legitimate treatment for anxiety.  I do not want to drink anymore, but when all this anxiety started and I started having these racing thoughts I could not handle it and started drinking again.     Discussed with patient restarting naltrexone which she has been on the past for alcohol abuse discussed with patient could later use Vivitrol but at this time will use PL naltrexone to see if patient tolerates.  Patient is agreeable to plan        Hx 0 prior psychiatric hospitalizations. Hx 0 suicide attempts.       Patient reports if family history significant for alcoholism, states has multiple siblings with alcohol abuse issues.     Pt denies hx trauma. Denies physical/sexual abuse. Pt denies symptoms including nightmares, hypervigilance, flashbacks, avoidance behaviors, and disassociation.     Pt reports currently taking clonazepam 0.5 mg b.i.d. p.r.n. severe anxiety from PCP, will discontinue this medication as a shorter acting benzo for panic attacks is prefer will due to current alcohol use.  Patient also currently taking hydroxyzine p.r.n. anxiety     Patients mood is anxious, affect appears mood congruent. Linear and logical, friendly and cooperative, good eye contact.     Reports sleeping 5-7 hrs per night but states now is frequently waking up suddenly and the night with panic attacks, and decreased appetite.      Denies SI/HI/AVH. Denies side effects of medications.     The patient complained of depressed mood with lethargy, decreased appetite, anhedonia, apathy, worsening self-esteem, guilt, decreased concentration and ability to make  decisions.             Review of Systems     Review of Systems   Constitutional: Negative for fever.   HENT: Negative for sore throat.    Eyes: Negative for photophobia.   Respiratory: Negative for cough.    Cardiovascular: Negative for chest pain and palpitations.   Gastrointestinal: Negative for abdominal pain.   Genitourinary: Negative for dysuria.   Musculoskeletal: Negative for myalgias.   Skin: Negative for rash.   Neurological: Negative for dizziness.   Endo/Heme/Allergies: Does not bruise/bleed easily.   Psychiatric/Behavioral: Positive for substance abuse. Negative for depression, hallucinations and suicidal ideas. The patient is nervous/anxious. The patient does not have insomnia.        Psychiatric Review Of Systems - Is patient experiencing or having changes in:  sleep: yes, improved  appetite: no  weight: no  energy/anergy: no  interest/pleasure/anhedonia: no  somatic symptoms: no  libido: no  anxiety/panic: yes, improved  guilty/hopelessness: no  concentration: no  S.I.B.s/risky behavior: no  Irritability: no  Racing thoughts: no  Impulsive behaviors: no  Paranoia: no  AVH: no      Past Medical, Family and Social History: The patient's past medical, family and social history have been reviewed and updated as appropriate within the electronic medical record - see encounter notes.      Current Medications:   Medication List with Changes/Refills   Current Medications    ALLOPURINOL (ZYLOPRIM) 100 MG TABLET    Take 2 tablets (200 mg total) by mouth once daily.    CLONAZEPAM (KLONOPIN) 0.5 MG TABLET    Take 1 tablet (0.5 mg total) by mouth daily as needed for Anxiety.    CYANOCOBALAMIN (VITAMIN B-12) 1000 MCG TABLET    Take 1 tablet (1,000 mcg total) by mouth once daily.    DICLOFENAC SODIUM (VOLTAREN) 1 % GEL    Apply 2 g topically once daily.    ELIQUIS 5 MG TAB    TAKE 1 TABLET(5 MG) BY MOUTH TWICE DAILY    FLECAINIDE (TAMBOCOR) 100 MG TAB    TAKE 1 TABLET(100 MG) BY MOUTH EVERY 12 HOURS AS NEEDED     FOLIC ACID (FOLVITE) 1 MG TABLET    Take 1 tablet (1 mg total) by mouth once daily.    HYDROXYZINE HCL (ATARAX) 25 MG TABLET    Take 1 tablet (25 mg total) by mouth 3 (three) times daily as needed for Anxiety.    JANUVIA 100 MG TAB    TAKE 1 TABLET(100 MG) BY MOUTH EVERY DAY    LORAZEPAM (ATIVAN) 1 MG TABLET    Take 0.5 tablets (0.5 mg total) by mouth nightly as needed (for panic attacks. Do not combine with alcohol).    MECLIZINE (ANTIVERT) 25 MG TABLET    Take 1 tablet (25 mg total) by mouth 2 (two) times daily as needed.    METFORMIN (GLUCOPHAGE-XR) 500 MG ER 24HR TABLET    TAKE 2 TABLETS(1000 MG) BY MOUTH TWICE DAILY WITH MEALS    METOPROLOL SUCCINATE (TOPROL-XL) 25 MG 24 HR TABLET    Take 1 tablet (25 mg total) by mouth once daily.    MULTIVITAMIN (THERAGRAN) TABLET    Take 1 tablet by mouth once daily.    MULTIVITAMIN-MINERALS-LUTEIN (MULTIVITAMIN 50 PLUS) TAB    Take 1 tablet by mouth once daily.    NALTREXONE (DEPADE) 50 MG TABLET    Take 50 mg by mouth once daily.    OMEGA-3 ACID ETHYL ESTERS (LOVAZA) 1 GRAM CAPSULE    Take 2 g by mouth 2 (two) times daily.    ONDANSETRON (ZOFRAN) 8 MG TABLET    Take 1 tablet (8 mg total) by mouth every 8 (eight) hours as needed for Nausea.    PANTOPRAZOLE (PROTONIX) 40 MG TABLET    TAKE 1 TABLET BY MOUTH EVERY DAY    ROPINIROLE (REQUIP) 1 MG TABLET    TAKE 1 TABLET(1 MG) BY MOUTH EVERY EVENING    ROSUVASTATIN (CRESTOR) 20 MG TABLET    TAKE 1 TABLET(20 MG) BY MOUTH EVERY DAY    TAMSULOSIN (FLOMAX) 0.4 MG CAP    TAKE 1 CAPSULE(0.4 MG) BY MOUTH EVERY DAY    VENLAFAXINE (EFFEXOR-XR) 37.5 MG 24 HR CAPSULE    Take 1 capsule (37.5 mg total) by mouth once daily.    VITAMIN B-1 100 MG TABLET    TAKE 1 TABLET BY MOUTH ONCE DAILY         Allergies:   Review of patient's allergies indicates:   Allergen Reactions    No known drug allergies          Vitals   Vitals:    06/16/22 0848   BP: 136/78   Pulse: 75          Labs/Imaging/Studies:   No results found for this or any previous  visit (from the past 48 hour(s)).   No results found for: PHENYTOIN, PHENOBARB, VALPROATE, CBMZ    Compliance: yes    Side effects: None    Risk Parameters:  Patient reports no suicidal ideation  Patient reports no homicidal ideation  Patient reports no self-injurious behavior  Patient reports no violent behavior    Exam (detailed: at least 9 elements; comprehensive: all 15 elements)   Constitutional  Vitals:  Most recent vital signs, dated less than 90 days prior to this appointment, were reviewed.   Vitals:    06/16/22 0848   BP: 136/78   Pulse: 75   Weight: 79.8 kg (175 lb 13.1 oz)        General:  unremarkable, age appropriate     Musculoskeletal  Muscle Strength/Tone:  not examined   Gait & Station:  non-ataxic     Psychiatric  Speech:  no latency; no press   Mood & Affect:  steady, euthymic  congruent and appropriate   Thought Process:  normal and logical   Associations:  intact   Thought Content:  normal, no suicidality, no homicidality, delusions, or paranoia   Insight:  intact, has awareness of illness   Judgement: behavior is adequate to circumstances   Orientation:  grossly intact   Memory: intact for content of interview   Language: grossly intact   Attention Span & Concentration:  able to focus   Fund of Knowledge:  intact and appropriate to age and level of education     Assessment and Diagnosis   Status/Progress: Based on the examination today, the patient's problem(s) is/are improved and adequately but not ideally controlled.  New problems have not been presented today.   Lack of compliance are complicating management of the primary condition.  There are no active rule-out diagnoses for this patient at this time.     General Impression:      ICD-10-CM ICD-9-CM   1. Alcohol use disorder, moderate, dependence  F10.20 303.90   2. DOLORES (generalized anxiety disorder)  F41.1 300.02   3. Anxiety  F41.9 300.00   4. H/O ETOH abuse  F10.11 305.03       Intervention/Counseling/Treatment Plan   · Medication  Management: Continue current medications. The risks and benefits of medication were discussed with the patient.  · AA/NA/CA/ACOA/Abstinence    -start naltrexone 50 mg daily, pt did not start since previous visit. Pt states will start today    -increase Effexor to 75 mg daily  -continue hydroxyzine p.r.n. anxiety as prescribed by PCP    -continue lorazepam 0.5 mg once daily as needed for panic attack.  Do not combine with alcohol as discussed at length.  Patient reports understanding and agreeable to plan       Return to Clinic: 2 months        Santiago He PA-C      Total face to face time: 32 min  Total time (chart review, patient contact, documentation): 38 min      *This note has been prepared using a combination of a dictation device and typing.  It has been checked for errors but some errors may still exist within the note as a result of speech recognition errors and/or typographical errors.

## 2022-06-17 ENCOUNTER — PATIENT MESSAGE (OUTPATIENT)
Dept: RHEUMATOLOGY | Facility: CLINIC | Age: 67
End: 2022-06-17
Payer: MEDICARE

## 2022-07-13 ENCOUNTER — PATIENT MESSAGE (OUTPATIENT)
Dept: ADMINISTRATIVE | Facility: HOSPITAL | Age: 67
End: 2022-07-13
Payer: MEDICARE

## 2022-07-19 ENCOUNTER — PATIENT MESSAGE (OUTPATIENT)
Dept: RESEARCH | Facility: CLINIC | Age: 67
End: 2022-07-19
Payer: MEDICARE

## 2022-07-22 NOTE — TELEPHONE ENCOUNTER
----- Message from Mariely Arias sent at 10/8/2019 12:05 PM CDT -----  Patient's daughter, Chani, called.   No. [phone]   Chani has questions regarding the lab results.    Please call.    
Please contact pt's daughter for questions about the pt's lab results.  
Suzan Castillo

## 2022-08-03 RX ORDER — ALLOPURINOL 100 MG/1
200 TABLET ORAL DAILY
Qty: 180 TABLET | Refills: 3 | Status: SHIPPED | OUTPATIENT
Start: 2022-08-03 | End: 2024-02-23

## 2022-08-03 NOTE — TELEPHONE ENCOUNTER
----- Message from Marlin Long sent at 8/3/2022  9:30 AM CDT -----  Regarding: refill  Contact: pt @ [phone]  Rx Refill/Request     Is this a Refill or New Rx:  refill    Rx Name and Strength:  allopurinoL (ZYLOPRIM) 100 MG tablet    Preferred Pharmacy with phone number:     [id number] - [place] - [address]  [address]  [address] [place] [address]  Phone: [phone] Fax: [phone]    Communication Preference:pt @ [phone]  Additional Information:

## 2022-08-07 NOTE — PROGRESS NOTES
Subjective:      Patient ID: Donald Warren Abadie is a 64 y.o. male.    Chief Complaint:  No chief complaint on file.    History of Present Illness  Donald Warren Abadie is here for follow up of T2DM.  Previously seen by DANIELITO Morales NP.  Last seen 20.  This is their first visit with me.    This is a MyChart video visit.    The patient location is: home- [place].   The chief complaint leading to consultation is: DM  Visit type: Virtual visit with synchronous audio and video  Total time spent with patient: see below.   Each patient to whom he or she provides medical services by telemedicine is:  (1) informed of the relationship between the physician and patient and the respective role of any other health care provider with respect to management of the patient; and (2) notified that he or she may decline to receive medical services by telemedicine and may withdraw from such care at any time.    With regards to diabetes:    Diagnosed:   Known complications:  DKA -  RN -  PN +  Nephropathy -  CAD -    Current regimen:  MFM 500mg QID  Januvia 100mg daily    Other medications tried:  SFU    Glucose Monitor:   4 times a day testing  Log reviewed: oral recall  Fastin-130  Throughout the day: 110s    Hypoglycemia:  Denies.  Knows how to correct with 15 grams of carbs- juice, coke, or a peppermint.     Diet/Exercise:  Dietary recall: He feels that he is not following diabetic diet.  Eats 3 meals a day. He reports he is trying to avoid rice  B: cereal or biscuit from [employer]  L: red beans and rice; BLT sandwiches  D: beef stew and vegetables  Snacks: ice cream and cookies (sugar free)  Drinks: water and iced tea   Exercise - tries to stay active. Exercising more.      Education - last visit: 2020  Eye Exam: 2019  Podiatry: 2020    Diabetes Management Status    Hemoglobin A1C   Date Value Ref Range Status   2020 6.6 (H) 4.0 - 5.6 % Final     Comment:     ADA Screening Guidelines:  5.7-6.4%  Consistent  with prediabetes  >or=6.5%  Consistent with diabetes  High levels of fetal hemoglobin interfere with the HbA1C  assay. Heterozygous hemoglobin variants (HbS, HgC, etc)do  not significantly interfere with this assay.   However, presence of multiple variants may affect accuracy.     01/17/2020 8.2 (H) 4.0 - 5.6 % Final     Comment:     ADA Screening Guidelines:  5.7-6.4%  Consistent with prediabetes  >or=6.5%  Consistent with diabetes  High levels of fetal hemoglobin interfere with the HbA1C  assay. Heterozygous hemoglobin variants (HbS, HgC, etc)do  not significantly interfere with this assay.   However, presence of multiple variants may affect accuracy.     10/07/2019 9.4 (H) 4.0 - 5.6 % Final     Comment:     ADA Screening Guidelines:  5.7-6.4%  Consistent with prediabetes  >or=6.5%  Consistent with diabetes  High levels of fetal hemoglobin interfere with the HbA1C  assay. Heterozygous hemoglobin variants (HbS, HgC, etc)do  not significantly interfere with this assay.   However, presence of multiple variants may affect accuracy.         Statin: Taking  ACE/ARB: Not taking  Screening or Prevention Patient's value Goal Complete/Controlled?   HgA1C Testing and Control   Lab Results   Component Value Date    HGBA1C 6.6 (H) 05/27/2020      Annually/Less than 8% Yes   Lipid profile : 02/07/2020 Annually Yes   LDL control Lab Results   Component Value Date    LDLCALC 58.0 (L) 02/07/2020    Annually/Less than 100 mg/dl  Yes   Nephropathy screening Lab Results   Component Value Date    LABMICR <2.5 10/17/2019     Lab Results   Component Value Date    PROTEINUA Negative 03/21/2019    Annually Yes   Blood pressure BP Readings from Last 1 Encounters:   03/13/20 110/62    Less than 140/90 Yes   Dilated retinal exam : 02/07/2019 Annually Yes   Foot exam   : 02/21/2020 Annually Yes     Review of Systems   Constitutional: Negative for fatigue.   Eyes: Negative for visual disturbance.   Respiratory: Negative for shortness of  breath.    Cardiovascular: Negative for chest pain.   Gastrointestinal: Positive for abdominal pain and diarrhea.   Musculoskeletal: Negative for arthralgias.   Skin: Negative for wound.   Neurological: Negative for headaches.   Hematological: Does not bruise/bleed easily.   Psychiatric/Behavioral: Negative for sleep disturbance.     There is no height or weight on file to calculate BMI.    Lab Review:   Lab Results   Component Value Date    HGBA1C 6.6 (H) 05/27/2020    HGBA1C 8.2 (H) 01/17/2020    HGBA1C 9.4 (H) 10/07/2019       Lab Results   Component Value Date    CHOL 110 (L) 02/07/2020    HDL 27 (L) 02/07/2020    LDLCALC 58.0 (L) 02/07/2020    TRIG 125 02/07/2020    CHOLHDL 24.5 02/07/2020     Lab Results   Component Value Date     05/27/2020    K 4.3 05/27/2020     05/27/2020    CO2 24 05/27/2020     (H) 05/27/2020    BUN 6 (L) 05/27/2020    CREATININE 0.9 05/27/2020    CALCIUM 9.8 05/27/2020    PROT 7.2 05/27/2020    ALBUMIN 4.4 05/27/2020    BILITOT 0.5 05/27/2020    ALKPHOS 85 05/27/2020    AST 21 05/27/2020    ALT 15 05/27/2020    ANIONGAP 11 05/27/2020    ESTGFRAFRICA >60.0 05/27/2020    EGFRNONAA >60.0 05/27/2020    TSH 1.304 02/27/2020     No results found for: [id number]  Assessment and Plan     1. Type 2 diabetes mellitus with diabetic polyneuropathy, without long-term current use of insulin  Hemoglobin A1C    Comprehensive metabolic panel   2. Renal cell carcinoma of left kidney     3. Hyperlipidemia associated with type 2 diabetes mellitus     4. Overweight (BMI 25.0-29.9)       Type 2 diabetes mellitus with diabetic polyneuropathy, without long-term current use of insulin  -- Labs prior to follow up.  -- A1c goal <7.5%.  -- Medications discussed:  MFM - GI issues  GLP1-DPP4   CANTU   -- Reviewed logs/CGM:  Glucose unremarkable with A1c at goal.  Patient also reports that since his knee surgery he has increased exercise and noticed the positive impact it has on blood glucose.    Patient not tolerating MFM and wants to get off of it due to recent recall  Will stop MFM and continue DPP4 with instruction to check glucose fasting, before dinner and before bed.  Instructed to send glucose logs in 4 days.  Reach out to me sooner for any glucose <70 or consistently >200.  -- Medication Changes:   STOP:  MFM  CONTINUE:  Januvia 100mg daily   -- Reviewed goals of therapy are to get the best control we can without hypoglycemia.  -- Reviewed patient's current insulin regimen. Clarified proper insulin dose and timing in relation to meals, etc. Insulin injection sites and proper rotation instructed.    -- Advised frequent self blood glucose monitoring.  Patient encouraged to document glucose results and bring them to every clinic visit.  -- Hypoglycemia precautions discussed. Instructed on precautions before driving.    -- Call for Bg repeatedly < 90 or > 180.   -- Close adherence to lifestyle changes recommended.   -- Periodic follow ups for eye evaluations, foot care and dental care suggested.    Renal cell cancer  -- Continue following with urology.    Hyperlipidemia associated with type 2 diabetes mellitus  -- Controlled.  -- On statin per ADA recommendations.    Overweight (BMI 25.0-29.9)  -- Encouraged dietary and lifestyle modifications.  -- Emphasized weight loss goals.    No follow-ups on file.    I spent 20 minutes face-to-face with the patient, over half of the visit was spent on counseling and/or coordinating the care of the patient.    Counseling includes:  Diagnostic results, impressions, recommendations   Prognosis   Risk and benefits of management/treatment options   Instructions for management treatment and or follow-up   Importance of compliance with management   Risk factor reduction   Patient education   07-Aug-2022 15:53

## 2022-08-24 ENCOUNTER — PATIENT MESSAGE (OUTPATIENT)
Dept: ADMINISTRATIVE | Facility: HOSPITAL | Age: 67
End: 2022-08-24
Payer: MEDICARE

## 2022-08-31 DIAGNOSIS — E11.9 TYPE 2 DIABETES MELLITUS WITHOUT COMPLICATION, UNSPECIFIED WHETHER LONG TERM INSULIN USE: ICD-10-CM

## 2022-09-07 ENCOUNTER — PATIENT MESSAGE (OUTPATIENT)
Dept: ADMINISTRATIVE | Facility: HOSPITAL | Age: 67
End: 2022-09-07
Payer: MEDICARE

## 2022-09-20 ENCOUNTER — OFFICE VISIT (OUTPATIENT)
Dept: INTERNAL MEDICINE | Facility: CLINIC | Age: 67
End: 2022-09-20
Payer: MEDICARE

## 2022-09-20 ENCOUNTER — HOSPITAL ENCOUNTER (OUTPATIENT)
Dept: RADIOLOGY | Facility: HOSPITAL | Age: 67
Discharge: HOME | End: 2022-09-20
Attending: PHYSICIAN ASSISTANT
Payer: MEDICARE

## 2022-09-20 VITALS
DIASTOLIC BLOOD PRESSURE: 72 MMHG | BODY MASS INDEX: 29.34 KG/M2 | HEART RATE: 118 BPM | OXYGEN SATURATION: 97 % | SYSTOLIC BLOOD PRESSURE: 108 MMHG | WEIGHT: 182.56 LBS | HEIGHT: 66 IN

## 2022-09-20 DIAGNOSIS — D69.6 THROMBOCYTOPENIA: ICD-10-CM

## 2022-09-20 DIAGNOSIS — F10.20 ALCOHOL USE DISORDER, MODERATE, DEPENDENCE: ICD-10-CM

## 2022-09-20 DIAGNOSIS — A49.9 BACTERIAL UTI: ICD-10-CM

## 2022-09-20 DIAGNOSIS — I48.0 PAROXYSMAL ATRIAL FIBRILLATION: ICD-10-CM

## 2022-09-20 DIAGNOSIS — N39.0 BACTERIAL UTI: ICD-10-CM

## 2022-09-20 DIAGNOSIS — E11.42 TYPE 2 DIABETES MELLITUS WITH DIABETIC POLYNEUROPATHY, WITHOUT LONG-TERM CURRENT USE OF INSULIN: ICD-10-CM

## 2022-09-20 DIAGNOSIS — I70.0 AORTIC ATHEROSCLEROSIS: ICD-10-CM

## 2022-09-20 DIAGNOSIS — K76.0 FATTY LIVER: ICD-10-CM

## 2022-09-20 DIAGNOSIS — Z12.11 COLON CANCER SCREENING: ICD-10-CM

## 2022-09-20 DIAGNOSIS — M15.9 PRIMARY OSTEOARTHRITIS INVOLVING MULTIPLE JOINTS: ICD-10-CM

## 2022-09-20 DIAGNOSIS — E66.3 OVERWEIGHT (BMI 25.0-29.9): ICD-10-CM

## 2022-09-20 DIAGNOSIS — D69.2 SENILE PURPURA: ICD-10-CM

## 2022-09-20 DIAGNOSIS — I48.3 TYPICAL ATRIAL FLUTTER: ICD-10-CM

## 2022-09-20 DIAGNOSIS — K21.9 GASTROESOPHAGEAL REFLUX DISEASE WITHOUT ESOPHAGITIS: ICD-10-CM

## 2022-09-20 DIAGNOSIS — Z85.528 PERSONAL HISTORY OF KIDNEY CANCER: ICD-10-CM

## 2022-09-20 DIAGNOSIS — Z00.00 ENCOUNTER FOR PREVENTIVE HEALTH EXAMINATION: Primary | ICD-10-CM

## 2022-09-20 DIAGNOSIS — E78.5 HYPERLIPIDEMIA ASSOCIATED WITH TYPE 2 DIABETES MELLITUS: ICD-10-CM

## 2022-09-20 DIAGNOSIS — M1A.09X0 IDIOPATHIC CHRONIC GOUT OF MULTIPLE SITES WITHOUT TOPHUS: ICD-10-CM

## 2022-09-20 DIAGNOSIS — E11.69 HYPERLIPIDEMIA ASSOCIATED WITH TYPE 2 DIABETES MELLITUS: ICD-10-CM

## 2022-09-20 PROCEDURE — 3074F PR MOST RECENT SYSTOLIC BLOOD PRESSURE < 130 MM HG: ICD-10-PCS | Mod: CPTII,S$GLB,, | Performed by: NURSE PRACTITIONER

## 2022-09-20 PROCEDURE — G0439 PR MEDICARE ANNUAL WELLNESS SUBSEQUENT VISIT: ICD-10-PCS | Mod: S$GLB,,, | Performed by: NURSE PRACTITIONER

## 2022-09-20 PROCEDURE — 1159F MED LIST DOCD IN RCRD: CPT | Mod: CPTII,S$GLB,, | Performed by: NURSE PRACTITIONER

## 2022-09-20 PROCEDURE — 3044F HG A1C LEVEL LT 7.0%: CPT | Mod: CPTII,S$GLB,, | Performed by: NURSE PRACTITIONER

## 2022-09-20 PROCEDURE — 3072F LOW RISK FOR RETINOPATHY: CPT | Mod: CPTII,S$GLB,, | Performed by: NURSE PRACTITIONER

## 2022-09-20 PROCEDURE — 76770 US KIDNEY: ICD-10-PCS | Mod: 26,GC,, | Performed by: RADIOLOGY

## 2022-09-20 PROCEDURE — 76770 US EXAM ABDO BACK WALL COMP: CPT | Mod: 26,GC,, | Performed by: RADIOLOGY

## 2022-09-20 PROCEDURE — 1170F FXNL STATUS ASSESSED: CPT | Mod: CPTII,S$GLB,, | Performed by: NURSE PRACTITIONER

## 2022-09-20 PROCEDURE — 99499 RISK ADDL DX/OHS AUDIT: ICD-10-PCS | Mod: S$GLB,,, | Performed by: NURSE PRACTITIONER

## 2022-09-20 PROCEDURE — G0439 PPPS, SUBSEQ VISIT: HCPCS | Mod: S$GLB,,, | Performed by: NURSE PRACTITIONER

## 2022-09-20 PROCEDURE — 3072F PR LOW RISK FOR RETINOPATHY: ICD-10-PCS | Mod: CPTII,S$GLB,, | Performed by: NURSE PRACTITIONER

## 2022-09-20 PROCEDURE — 3044F PR MOST RECENT HEMOGLOBIN A1C LEVEL <7.0%: ICD-10-PCS | Mod: CPTII,S$GLB,, | Performed by: NURSE PRACTITIONER

## 2022-09-20 PROCEDURE — 99999 PR PBB SHADOW E&M-EST. PATIENT-LVL V: ICD-10-PCS | Mod: PBBFAC,,, | Performed by: NURSE PRACTITIONER

## 2022-09-20 PROCEDURE — 1126F AMNT PAIN NOTED NONE PRSNT: CPT | Mod: CPTII,S$GLB,, | Performed by: NURSE PRACTITIONER

## 2022-09-20 PROCEDURE — 3288F PR FALLS RISK ASSESSMENT DOCUMENTED: ICD-10-PCS | Mod: CPTII,S$GLB,, | Performed by: NURSE PRACTITIONER

## 2022-09-20 PROCEDURE — 1160F RVW MEDS BY RX/DR IN RCRD: CPT | Mod: CPTII,S$GLB,, | Performed by: NURSE PRACTITIONER

## 2022-09-20 PROCEDURE — 76770 US EXAM ABDO BACK WALL COMP: CPT | Mod: TC

## 2022-09-20 PROCEDURE — 1126F PR PAIN SEVERITY QUANTIFIED, NO PAIN PRESENT: ICD-10-PCS | Mod: CPTII,S$GLB,, | Performed by: NURSE PRACTITIONER

## 2022-09-20 PROCEDURE — 3074F SYST BP LT 130 MM HG: CPT | Mod: CPTII,S$GLB,, | Performed by: NURSE PRACTITIONER

## 2022-09-20 PROCEDURE — 3078F PR MOST RECENT DIASTOLIC BLOOD PRESSURE < 80 MM HG: ICD-10-PCS | Mod: CPTII,S$GLB,, | Performed by: NURSE PRACTITIONER

## 2022-09-20 PROCEDURE — 99999 PR PBB SHADOW E&M-EST. PATIENT-LVL V: CPT | Mod: PBBFAC,,, | Performed by: NURSE PRACTITIONER

## 2022-09-20 PROCEDURE — 1101F PR PT FALLS ASSESS DOC 0-1 FALLS W/OUT INJ PAST YR: ICD-10-PCS | Mod: CPTII,S$GLB,, | Performed by: NURSE PRACTITIONER

## 2022-09-20 PROCEDURE — 1159F PR MEDICATION LIST DOCUMENTED IN MEDICAL RECORD: ICD-10-PCS | Mod: CPTII,S$GLB,, | Performed by: NURSE PRACTITIONER

## 2022-09-20 PROCEDURE — 1160F PR REVIEW ALL MEDS BY PRESCRIBER/CLIN PHARMACIST DOCUMENTED: ICD-10-PCS | Mod: CPTII,S$GLB,, | Performed by: NURSE PRACTITIONER

## 2022-09-20 PROCEDURE — 3008F BODY MASS INDEX DOCD: CPT | Mod: CPTII,S$GLB,, | Performed by: NURSE PRACTITIONER

## 2022-09-20 PROCEDURE — 1170F PR FUNCTIONAL STATUS ASSESSED: ICD-10-PCS | Mod: CPTII,S$GLB,, | Performed by: NURSE PRACTITIONER

## 2022-09-20 PROCEDURE — 3008F PR BODY MASS INDEX (BMI) DOCUMENTED: ICD-10-PCS | Mod: CPTII,S$GLB,, | Performed by: NURSE PRACTITIONER

## 2022-09-20 PROCEDURE — 3078F DIAST BP <80 MM HG: CPT | Mod: CPTII,S$GLB,, | Performed by: NURSE PRACTITIONER

## 2022-09-20 PROCEDURE — 99499 UNLISTED E&M SERVICE: CPT | Mod: S$GLB,,, | Performed by: NURSE PRACTITIONER

## 2022-09-20 PROCEDURE — 1101F PT FALLS ASSESS-DOCD LE1/YR: CPT | Mod: CPTII,S$GLB,, | Performed by: NURSE PRACTITIONER

## 2022-09-20 PROCEDURE — 3288F FALL RISK ASSESSMENT DOCD: CPT | Mod: CPTII,S$GLB,, | Performed by: NURSE PRACTITIONER

## 2022-09-20 NOTE — PROGRESS NOTES
"Donald Abadie presented for a  Medicare AWV and comprehensive Health Risk Assessment today. The following components were reviewed and updated:    Medical history  Family History  Social history  Allergies and Current Medications  Health Risk Assessment  Health Maintenance  Care Team         ** See Completed Assessments for Annual Wellness Visit within the encounter summary.**         The following assessments were completed:  Living Situation  CAGE  Depression Screening  Timed Get Up and Go  Whisper Test - N/A hearing impairment, wears hearing aids  Cognitive Function Screening - Abnormal mini cog, recalled 1/3 words at 3 minutes. Partial clock drawing.     Nutrition Screening  ADL Screening  PAQ Screening  Review for Opioid Screening: Pt does not have Rx for Opioids.   Review for Substance Use Disorders: Patient drinks 6-8 beers daily.        Vitals:    09/20/22 0939   BP: 108/72   BP Location: Right arm   Patient Position: Sitting   Pulse: (!) 118   SpO2: 97%   Weight: 82.8 kg (182 lb 8.7 oz)   Height: 5' 6" (1.676 m)     Body mass index is 29.46 kg/m².    Physical Exam  Vitals reviewed.   Constitutional:       Appearance: Normal appearance.   HENT:      Head: Normocephalic.   Cardiovascular:      Rate and Rhythm: Rhythm irregular.   Pulmonary:      Effort: Pulmonary effort is normal.   Abdominal:      General: Bowel sounds are normal.   Musculoskeletal:         General: Normal range of motion.      Cervical back: Normal range of motion.      Right lower leg: No edema.      Left lower leg: No edema.   Skin:     General: Skin is warm and dry.      Capillary Refill: Capillary refill takes less than 2 seconds.   Neurological:      Mental Status: He is alert and oriented to person, place, and time.   Psychiatric:         Behavior: Behavior normal.         Thought Content: Thought content normal.         Judgment: Judgment normal.             Diagnoses and health risks identified today and associated " recommendations/orders:    1. Encounter for preventive health examination  Assessments completed. Abnormal mini cog, recalled 1/3 words at 3 minutes. Partial clock drawing.    recommendations reviewed. Instructed to schedule eye exam. Podiatry referral placed. Discussed flu shot, covid booster, and pneumonia vaccine. Labs per PCP. Referral placed for colonoscopy scheduling.  F/u with PCP as scheduled.    2. Aortic atherosclerosis  Chronic, stable on current regimen. Followed by cardiology.     3. Typical atrial flutter  Chronic, stable on current regimen. Followed by cardiology.     4. Paroxysmal atrial fibrillation  Chronic, stable on current regimen. Followed by cardiology.     5. Alcohol use disorder, moderate, dependence  Chronic, stable on current regimen. Followed by psychiatry.    6. Thrombocytopenia  Chronic, stable on current regimen. Followed by PCP.    7. Senile purpura  Chronic, stable on current regimen. Followed by PCP.    8. Type 2 diabetes mellitus with diabetic polyneuropathy, without long-term current use of insulin  Chronic, stable on current regimen. Followed by endocrinology. Referred to podiatry for foot exam.  - Ambulatory referral/consult to Podiatry; Future    9. Hyperlipidemia associated with type 2 diabetes mellitus  Chronic, stable on current regimen. Followed by PCP / endocrinology.    10. Overweight (BMI 25.0-29.9)  Chronic, stable on current regimen. Followed by PCP.    11. Personal history of kidney cancer  Chronic, stable on current regimen. Followed by PCP.    12. Gastroesophageal reflux disease without esophagitis  Chronic, stable on current regimen. Followed by PCP.    13. Fatty liver  Chronic, stable on current regimen. Followed by PCP.    14. Primary osteoarthritis involving multiple joints  Chronic, stable on current regimen. Followed by rheumatology.    15. Idiopathic chronic gout of multiple sites without tophus  Chronic, stable on current regimen. Followed by  rheumatology.    16. Colon cancer screening  Referral placed for colonoscopy scheduling. Patient provided with endoscopy scheduling department contact info.  - Ambulatory referral/consult to Endo Procedure ; Future      Provided Kleber with a 5-10 year written screening schedule and personal prevention plan. Recommendations were developed using the USPSTF age appropriate recommendations. Education, counseling, and referrals were provided as needed. After Visit Summary printed and given to patient which includes a list of additional screenings\tests needed.    Follow up in about 1 year (around 9/20/2023) for Medicare AWV and with PCP as scheduled.       Janice Montoya NP    I offered to discuss advanced care planning, including how to pick a person who would make decisions for you if you were unable to make them for yourself, called a health care power of , and what kind of decisions you might make such as use of life sustaining treatments such as ventilators and tube feeding when faced with a life limiting illness recorded on a living will that they will need to know. (How you want to be cared for as you near the end of your natural life)     X  Patient has advanced directives written and agrees to provide copies to the institution.

## 2022-09-20 NOTE — PATIENT INSTRUCTIONS
1. Follow up with Dr. Bacilio Larry MD as scheduled.    2. Schedule yearly eye exam.    3. Podiatry appointment for foot exam.     4. Flu shot if interested.    5. New covid booster available for scheduling if interested.    6. Recommend second pneumonia vaccine for extra protection, Prevnar 13.    7. Colonoscopy ordered. If you have not heard from the endoscopy within 7-10 days, please call [phone] to schedule your colonoscopy.    Counseling and Referral of Other Preventative  (Italic type indicates deductible and co-insurance are waived)    Patient Name: Donald Abadie  Today's Date: 9/20/2022    Health Maintenance         Date Due Completion Date    Pneumococcal Vaccines (Age 65+) (2 - PCV) 10/21/2017 10/21/2016    Colorectal Cancer Screening 06/22/2019 6/22/2012    Diabetes Urine Screening 10/17/2020 10/17/2019    COVID-19 Vaccine (4 - Booster for Pfizer series) 02/28/2022 10/28/2021    Foot Exam 04/08/2022 4/8/2021    Override on 4/8/2021: Done    Eye Exam 05/10/2022 5/10/2021 (Done)    Override on 5/10/2021: Done    Hemoglobin A1c 07/10/2022 1/10/2022    Influenza Vaccine (1) 09/01/2022 1/10/2022    Lipid Panel 01/10/2023 1/10/2022    High Dose Statin 07/07/2023 7/7/2022    TETANUS VACCINE 04/13/2031 4/13/2021          Orders Placed This Encounter   Procedures    Ambulatory referral/consult to Podiatry    Ambulatory referral/consult to Endo Procedure        The following information is provided to all patients.  This information is to help you find resources for any of the problems found today that may be affecting your health:                Living healthy guide: www.[facility].[place].gov      Understanding Diabetes: www.diabetes.org      Eating healthy: www.cdc.gov/healthyweight      CDC home safety checklist: www.cdc.gov/steadi/patient.html      Agency on Aging: www.goea.[place].[facility]      Alcoholics anonymous (AA): www.aa.org      Physical Activity: www.steve.nih.gov/kj7obob      Tobacco use:  www.quitwithusla.org

## 2022-09-20 NOTE — Clinical Note
Medicare AWV completed. Abnormal mini cog, recalled 1/3 words at 3 minutes. Partial clock drawing. Instructed to schedule eye exam. Podiatry referral placed. Discussed flu shot, covid booster, and pneumonia vaccine. Labs per PCP. Referral placed for colonoscopy scheduling.  --Janice

## 2022-09-21 ENCOUNTER — PATIENT OUTREACH (OUTPATIENT)
Dept: ADMINISTRATIVE | Facility: HOSPITAL | Age: 67
End: 2022-09-21
Payer: MEDICARE

## 2022-10-10 ENCOUNTER — PATIENT MESSAGE (OUTPATIENT)
Dept: ADMINISTRATIVE | Facility: HOSPITAL | Age: 67
End: 2022-10-10
Payer: MEDICARE

## 2022-10-20 ENCOUNTER — LAB VISIT (OUTPATIENT)
Dept: LAB | Facility: HOSPITAL | Age: 67
End: 2022-10-20
Attending: INTERNAL MEDICINE
Payer: MEDICARE

## 2022-10-20 ENCOUNTER — OFFICE VISIT (OUTPATIENT)
Dept: RHEUMATOLOGY | Facility: CLINIC | Age: 67
End: 2022-10-20
Payer: MEDICARE

## 2022-10-20 VITALS
BODY MASS INDEX: 28.67 KG/M2 | HEIGHT: 66 IN | WEIGHT: 178.38 LBS | SYSTOLIC BLOOD PRESSURE: 121 MMHG | DIASTOLIC BLOOD PRESSURE: 76 MMHG | HEART RATE: 81 BPM

## 2022-10-20 DIAGNOSIS — M15.9 PRIMARY OSTEOARTHRITIS INVOLVING MULTIPLE JOINTS: ICD-10-CM

## 2022-10-20 DIAGNOSIS — M1A.09X0 IDIOPATHIC CHRONIC GOUT OF MULTIPLE SITES WITHOUT TOPHUS: ICD-10-CM

## 2022-10-20 DIAGNOSIS — M1A.09X0 IDIOPATHIC CHRONIC GOUT OF MULTIPLE SITES WITHOUT TOPHUS: Primary | ICD-10-CM

## 2022-10-20 LAB — URATE SERPL-MCNC: 5.9 MG/DL (ref 3.4–7)

## 2022-10-20 PROCEDURE — 1159F PR MEDICATION LIST DOCUMENTED IN MEDICAL RECORD: ICD-10-PCS | Mod: CPTII,S$GLB,, | Performed by: INTERNAL MEDICINE

## 2022-10-20 PROCEDURE — 1159F MED LIST DOCD IN RCRD: CPT | Mod: CPTII,S$GLB,, | Performed by: INTERNAL MEDICINE

## 2022-10-20 PROCEDURE — 3044F PR MOST RECENT HEMOGLOBIN A1C LEVEL <7.0%: ICD-10-PCS | Mod: CPTII,S$GLB,, | Performed by: INTERNAL MEDICINE

## 2022-10-20 PROCEDURE — 3078F PR MOST RECENT DIASTOLIC BLOOD PRESSURE < 80 MM HG: ICD-10-PCS | Mod: CPTII,S$GLB,, | Performed by: INTERNAL MEDICINE

## 2022-10-20 PROCEDURE — 99999 PR PBB SHADOW E&M-EST. PATIENT-LVL IV: ICD-10-PCS | Mod: PBBFAC,,, | Performed by: INTERNAL MEDICINE

## 2022-10-20 PROCEDURE — 3044F HG A1C LEVEL LT 7.0%: CPT | Mod: CPTII,S$GLB,, | Performed by: INTERNAL MEDICINE

## 2022-10-20 PROCEDURE — 3074F SYST BP LT 130 MM HG: CPT | Mod: CPTII,S$GLB,, | Performed by: INTERNAL MEDICINE

## 2022-10-20 PROCEDURE — 36415 COLL VENOUS BLD VENIPUNCTURE: CPT | Performed by: INTERNAL MEDICINE

## 2022-10-20 PROCEDURE — 99213 PR OFFICE/OUTPT VISIT, EST, LEVL III, 20-29 MIN: ICD-10-PCS | Mod: S$GLB,,, | Performed by: INTERNAL MEDICINE

## 2022-10-20 PROCEDURE — 3072F PR LOW RISK FOR RETINOPATHY: ICD-10-PCS | Mod: CPTII,S$GLB,, | Performed by: INTERNAL MEDICINE

## 2022-10-20 PROCEDURE — 1126F AMNT PAIN NOTED NONE PRSNT: CPT | Mod: CPTII,S$GLB,, | Performed by: INTERNAL MEDICINE

## 2022-10-20 PROCEDURE — 1160F PR REVIEW ALL MEDS BY PRESCRIBER/CLIN PHARMACIST DOCUMENTED: ICD-10-PCS | Mod: CPTII,S$GLB,, | Performed by: INTERNAL MEDICINE

## 2022-10-20 PROCEDURE — 99999 PR PBB SHADOW E&M-EST. PATIENT-LVL IV: CPT | Mod: PBBFAC,,, | Performed by: INTERNAL MEDICINE

## 2022-10-20 PROCEDURE — 99213 OFFICE O/P EST LOW 20 MIN: CPT | Mod: S$GLB,,, | Performed by: INTERNAL MEDICINE

## 2022-10-20 PROCEDURE — 3074F PR MOST RECENT SYSTOLIC BLOOD PRESSURE < 130 MM HG: ICD-10-PCS | Mod: CPTII,S$GLB,, | Performed by: INTERNAL MEDICINE

## 2022-10-20 PROCEDURE — 84550 ASSAY OF BLOOD/URIC ACID: CPT | Performed by: INTERNAL MEDICINE

## 2022-10-20 PROCEDURE — 1160F RVW MEDS BY RX/DR IN RCRD: CPT | Mod: CPTII,S$GLB,, | Performed by: INTERNAL MEDICINE

## 2022-10-20 PROCEDURE — 3078F DIAST BP <80 MM HG: CPT | Mod: CPTII,S$GLB,, | Performed by: INTERNAL MEDICINE

## 2022-10-20 PROCEDURE — 1126F PR PAIN SEVERITY QUANTIFIED, NO PAIN PRESENT: ICD-10-PCS | Mod: CPTII,S$GLB,, | Performed by: INTERNAL MEDICINE

## 2022-10-20 PROCEDURE — 3072F LOW RISK FOR RETINOPATHY: CPT | Mod: CPTII,S$GLB,, | Performed by: INTERNAL MEDICINE

## 2022-10-20 RX ORDER — SERTRALINE HYDROCHLORIDE 50 MG/1
TABLET, FILM COATED ORAL
COMMUNITY
Start: 2022-09-05 | End: 2023-01-19

## 2022-10-20 ASSESSMENT — ROUTINE ASSESSMENT OF PATIENT INDEX DATA (RAPID3)
PSYCHOLOGICAL DISTRESS SCORE: 2.2
PATIENT GLOBAL ASSESSMENT SCORE: 1
MDHAQ FUNCTION SCORE: 0
AM STIFFNESS SCORE: 0, NO
PAIN SCORE: 0
TOTAL RAPID3 SCORE: 0.33
FATIGUE SCORE: 0

## 2022-10-21 NOTE — PROGRESS NOTES
History of present illness:  66-year-old gentleman has a history of crystal proven gout which dates back to 1998.  I have been following him since 2006.  He remains on allopurinol 200 mg daily, although sometimes he will just take 100 mg.  He has had no recent gout attacks.  He has no history of tophi.  he is had no recent gout attacks.      I saw him in February for left shoulder pain.  I gave him an injection in the shoulder.  His shoulder is doing much better.  He is had no recurrence.  He is had no other peripheral joint complaints.  He is had no other recent medical problems.      Physical examination:  Musculoskeletal:  He has full range of motion of all joints.  He has no synovitis.  He has no tophi.      Assessment: Inter critical gout     Plans:   Laboratory studies obtained   Continue allopurinol 200 mg daily   Return in 12 months

## 2022-11-04 ENCOUNTER — OFFICE VISIT (OUTPATIENT)
Dept: CARDIOLOGY | Facility: CLINIC | Age: 67
End: 2022-11-04
Payer: MEDICARE

## 2022-11-04 VITALS
DIASTOLIC BLOOD PRESSURE: 72 MMHG | HEART RATE: 72 BPM | HEIGHT: 66 IN | SYSTOLIC BLOOD PRESSURE: 128 MMHG | WEIGHT: 178.56 LBS | BODY MASS INDEX: 28.7 KG/M2 | OXYGEN SATURATION: 97 %

## 2022-11-04 DIAGNOSIS — I48.0 PAROXYSMAL ATRIAL FIBRILLATION: Primary | ICD-10-CM

## 2022-11-04 DIAGNOSIS — I48.3 TYPICAL ATRIAL FLUTTER: ICD-10-CM

## 2022-11-04 DIAGNOSIS — R00.1 SINUS BRADYCARDIA: ICD-10-CM

## 2022-11-04 DIAGNOSIS — E11.69 HYPERLIPIDEMIA ASSOCIATED WITH TYPE 2 DIABETES MELLITUS: ICD-10-CM

## 2022-11-04 DIAGNOSIS — I70.0 AORTIC ATHEROSCLEROSIS: ICD-10-CM

## 2022-11-04 DIAGNOSIS — I48.91 ATRIAL FIBRILLATION, UNSPECIFIED TYPE: Primary | ICD-10-CM

## 2022-11-04 DIAGNOSIS — E78.5 HYPERLIPIDEMIA ASSOCIATED WITH TYPE 2 DIABETES MELLITUS: ICD-10-CM

## 2022-11-04 DIAGNOSIS — I50.32 CHRONIC DIASTOLIC CONGESTIVE HEART FAILURE: ICD-10-CM

## 2022-11-04 PROCEDURE — 3078F PR MOST RECENT DIASTOLIC BLOOD PRESSURE < 80 MM HG: ICD-10-PCS | Mod: CPTII,S$GLB,, | Performed by: INTERNAL MEDICINE

## 2022-11-04 PROCEDURE — 3078F DIAST BP <80 MM HG: CPT | Mod: CPTII,S$GLB,, | Performed by: INTERNAL MEDICINE

## 2022-11-04 PROCEDURE — 99499 RISK ADDL DX/OHS AUDIT: ICD-10-PCS | Mod: S$GLB,,, | Performed by: INTERNAL MEDICINE

## 2022-11-04 PROCEDURE — 1101F PR PT FALLS ASSESS DOC 0-1 FALLS W/OUT INJ PAST YR: ICD-10-PCS | Mod: CPTII,S$GLB,, | Performed by: INTERNAL MEDICINE

## 2022-11-04 PROCEDURE — 3288F FALL RISK ASSESSMENT DOCD: CPT | Mod: CPTII,S$GLB,, | Performed by: INTERNAL MEDICINE

## 2022-11-04 PROCEDURE — 1159F PR MEDICATION LIST DOCUMENTED IN MEDICAL RECORD: ICD-10-PCS | Mod: CPTII,S$GLB,, | Performed by: INTERNAL MEDICINE

## 2022-11-04 PROCEDURE — 1126F AMNT PAIN NOTED NONE PRSNT: CPT | Mod: CPTII,S$GLB,, | Performed by: INTERNAL MEDICINE

## 2022-11-04 PROCEDURE — 3008F PR BODY MASS INDEX (BMI) DOCUMENTED: ICD-10-PCS | Mod: CPTII,S$GLB,, | Performed by: INTERNAL MEDICINE

## 2022-11-04 PROCEDURE — 3074F SYST BP LT 130 MM HG: CPT | Mod: CPTII,S$GLB,, | Performed by: INTERNAL MEDICINE

## 2022-11-04 PROCEDURE — 99999 PR PBB SHADOW E&M-EST. PATIENT-LVL V: ICD-10-PCS | Mod: PBBFAC,,, | Performed by: INTERNAL MEDICINE

## 2022-11-04 PROCEDURE — 3288F PR FALLS RISK ASSESSMENT DOCUMENTED: ICD-10-PCS | Mod: CPTII,S$GLB,, | Performed by: INTERNAL MEDICINE

## 2022-11-04 PROCEDURE — 3072F LOW RISK FOR RETINOPATHY: CPT | Mod: CPTII,S$GLB,, | Performed by: INTERNAL MEDICINE

## 2022-11-04 PROCEDURE — 1101F PT FALLS ASSESS-DOCD LE1/YR: CPT | Mod: CPTII,S$GLB,, | Performed by: INTERNAL MEDICINE

## 2022-11-04 PROCEDURE — 1159F MED LIST DOCD IN RCRD: CPT | Mod: CPTII,S$GLB,, | Performed by: INTERNAL MEDICINE

## 2022-11-04 PROCEDURE — 99999 PR PBB SHADOW E&M-EST. PATIENT-LVL V: CPT | Mod: PBBFAC,,, | Performed by: INTERNAL MEDICINE

## 2022-11-04 PROCEDURE — 3044F PR MOST RECENT HEMOGLOBIN A1C LEVEL <7.0%: ICD-10-PCS | Mod: CPTII,S$GLB,, | Performed by: INTERNAL MEDICINE

## 2022-11-04 PROCEDURE — 3074F PR MOST RECENT SYSTOLIC BLOOD PRESSURE < 130 MM HG: ICD-10-PCS | Mod: CPTII,S$GLB,, | Performed by: INTERNAL MEDICINE

## 2022-11-04 PROCEDURE — 99499 UNLISTED E&M SERVICE: CPT | Mod: S$GLB,,, | Performed by: INTERNAL MEDICINE

## 2022-11-04 PROCEDURE — 99214 PR OFFICE/OUTPT VISIT, EST, LEVL IV, 30-39 MIN: ICD-10-PCS | Mod: S$GLB,,, | Performed by: INTERNAL MEDICINE

## 2022-11-04 PROCEDURE — 99214 OFFICE O/P EST MOD 30 MIN: CPT | Mod: S$GLB,,, | Performed by: INTERNAL MEDICINE

## 2022-11-04 PROCEDURE — 3008F BODY MASS INDEX DOCD: CPT | Mod: CPTII,S$GLB,, | Performed by: INTERNAL MEDICINE

## 2022-11-04 PROCEDURE — 3072F PR LOW RISK FOR RETINOPATHY: ICD-10-PCS | Mod: CPTII,S$GLB,, | Performed by: INTERNAL MEDICINE

## 2022-11-04 PROCEDURE — 1126F PR PAIN SEVERITY QUANTIFIED, NO PAIN PRESENT: ICD-10-PCS | Mod: CPTII,S$GLB,, | Performed by: INTERNAL MEDICINE

## 2022-11-04 PROCEDURE — 3044F HG A1C LEVEL LT 7.0%: CPT | Mod: CPTII,S$GLB,, | Performed by: INTERNAL MEDICINE

## 2022-11-04 NOTE — Clinical Note
I wanted this samantha to get an appointment with [facility], but it doesn't look like they made one, maybe because his last appointment was just over 3 years ago?  Anyway, I put in another referral just in case.  Is this anything we need to worry about or the EP department is going to handle?  Thanks! Uriel

## 2022-11-04 NOTE — PROGRESS NOTES
Subjective:   Chief Complaint: Establish Care, Follow-up, and Atrial Fibrillation  Last Clinic Visit: 2/9/2022     History of Present Illness: Donald Warren Abadie is a 67 y.o. gentleman with paroxysmal atrial fibrillation, alcohol abuse, hypertension, hypertriglyceridemia, elevated LFTs, status post total knee replacement, who comes in for follow-up.  Interval History:  When we saw him last reiterated proper flecainide and metoprolol dosing, he continues to report that he does not want to take metoprolol on a regular basis.  He is not however taking flecainide on a regular basis as well.  He reports that when he has an episode of atrial fibrillation he takes metoprolol, and then takes a flecainide after this.  Continues to have breakthrough episodes, and reports that they are fairly distressing, he reports underlying anxiety and this has exacerbated his anxiety disorder.  Mildly tangential on exam today.  When discussing alcohol he reports that he has had AFib without drinking and with drinking so he does not feel that there is a relationship.  He had quit drinking in the past, but it appears that he is drinking again.  Denies any chest pain, no syncope, no presyncope, no low blood pressure, no high blood pressure. Compliant with eliquis    2/9/2022  last week when he began to have low blood pressure, and unclear heart rate.  Also endorsed generalized constitutional symptoms, fever to 101, mild cough. Denies being tested for COVID.    On my exam today noted to be mildly tachycardic and irregular.  He has changed from p.r.n. flecainide when we saw him last to 100 b.i.d. unclear when he did this, continues to take Eliquis compliant with this.  Stopped metoprolol for again unclear reasons.    7/14/2020  Since we saw him last we check cholesterol, and LDL nicely down to 50s on Crestor.  CMP with AST ALT within normal limits.  FibroScan by outside provider with steatosis.  He reports that atrial fibrillation improving,  now down to only one episode a month, episodes triggered by gas and iced tea, relieved by belching.  He was taking flecainide and metoprolol daily, but now taking p.r.n..  Always takes metoprolol prior to taking flecainide, only able to take one/2 metoprolol due to bradycardia.  Continuing to not drink.  Eliquis was noted to be expensive, but still prefers this over warfarin.  1/15/2020   He has been doing reasonably well since we saw him last 5 months ago, still reports occasional paroxysmal atrial fibrillation, attributes it to when he forgets to take medications.  D He still takes metoprolol intermittently, afraid to take more due to bradycardia in the past.  Discussed the synergistic role of metoprolol with flecainide today.  Denies any focal neurological deficits.  He is doing better from a rehab standpoint of his right knee, tells the story today of some scar tissue breaking on the front of his knee when he jumped off of his truck, and knee has been moving better since then.    He also reports stopping fenofibrate for elevated triglycerides per our discussion.  Accidentally stopped Crestor the same time, has only been on Crestor back for one week  8/2/2019  He had an episodewhere he felt his blood pressure being elevated, and took several metoprolol, on top of diltiazem which we had recently started, and ultimately developed hypotension was admitted to the ER found to have bradycardia, and then had hypotensive PEA cardiac arrest, quick return of sinus rhythm,  It was felt that arrest was in the setting of multiple medications, alcohol.  Since that time he has stopped drinkin No recurrent cardiac arrest.  AFib burden has dramatically decreased now that he no longer drinks.  Medication-wise he states he is currently taking flecainide 100 b.i.d. along with metoprolol p.r.n. with episodes of AFib.  He has an episode of AFib every few weeks.   We decreased his Crestor from 20 mg to 10 mg with an LDL of 11 and some  mild transaminitis, repeat lipids in June showed that LDL went from 11 to 110.  No chest pain.  3/4/2019  He has a several year history of paroxysmal atrial fibrillation, very symptomatic, with palpitations, tachycardia, and has been seen in the ER in the past for it.  He is followed with Dr. Giang who he saw last year, and discussed antiarrhythmic therapy versus ablation, elected to pursue antiarrhythmic therapy given severe symptomatic nature of atrial fibrillation at that time.  Also with borderline age, elected to pursue anticoagulation, and currently on apixaban 5 b.i.d., he reports tolerating anticoagulation well, denies any falls, no bleeding, no hematemesis, no hematochezia.  Given alcohol abuse, discussed treating this prior to ablation.  From atrial fibrillation standpoint he reports approximately 1 episode a month, which last anywhere from 15 min to several hours.  He currently is on flecainide 100 b.i.d., and reports compliance with this medication, but also is on metoprolol succinate 25 daily, and only reports intermittent compliance with this medication due to fatigue.  In addition to succinate 25 daily he also has a prescription for 25 tartrate p.r.n., which he takes when he goes into episodes of atrial fibrillation.     He reports a persistent difficult to control hypertriglyceridemia, and currently on Crestor 20 which was recently increased as well as Lovaza and fenofibrate.  Most recent LDL 11.    He previously has discussed detox therapy with Psychiatry, however they elected for inpatient rehab given cardiac issues, and he declined.    PMHx:  Paroxysmal Atrial Fibrillation  EtOH abuse  Hypertriglyceridemia  Elevated LFTs  Osteoarthritis status post TKR    Medications:  Current Outpatient Medications on File Prior to Visit   Medication Sig    allopurinoL (ZYLOPRIM) 100 MG tablet Take 2 tablets (200 mg total) by mouth once daily.    cyanocobalamin (VITAMIN B-12) 1000 MCG tablet Take 1 tablet  (1,000 mcg total) by mouth once daily.    diclofenac sodium (VOLTAREN) 1 % Gel Apply 2 g topically once daily.    ELIQUIS 5 mg Tab TAKE 1 TABLET(5 MG) BY MOUTH TWICE DAILY    flecainide (TAMBOCOR) 100 MG Tab TAKE 1 TABLET(100 MG) BY MOUTH EVERY 12 HOURS AS NEEDED    hydrOXYzine HCL (ATARAX) 25 MG tablet Take 1 tablet (25 mg total) by mouth 3 (three) times daily as needed for Anxiety.    JANUVIA 100 mg Tab TAKE 1 TABLET(100 MG) BY MOUTH EVERY DAY    meclizine (ANTIVERT) 25 mg tablet Take 1 tablet (25 mg total) by mouth 2 (two) times daily as needed.    metFORMIN (GLUCOPHAGE-XR) 500 MG ER 24hr tablet TAKE 2 TABLETS(1000 MG) BY MOUTH TWICE DAILY WITH MEALS    metoprolol succinate (TOPROL-XL) 25 MG 24 hr tablet Take 1 tablet (25 mg total) by mouth once daily.    multivitamin (THERAGRAN) tablet Take 1 tablet by mouth once daily.    naltrexone (DEPADE) 50 mg tablet Take 50 mg by mouth once daily.    omega-3 acid ethyl esters (LOVAZA) 1 gram capsule Take 2 g by mouth 2 (two) times daily.    ondansetron (ZOFRAN) 8 MG tablet Take 1 tablet (8 mg total) by mouth every 8 (eight) hours as needed for Nausea.    pantoprazole (PROTONIX) 40 MG tablet TAKE 1 TABLET BY MOUTH EVERY DAY    rOPINIRole (REQUIP) 1 MG tablet TAKE 1 TABLET(1 MG) BY MOUTH EVERY EVENING    rosuvastatin (CRESTOR) 20 MG tablet TAKE 1 TABLET(20 MG) BY MOUTH EVERY DAY    sertraline (ZOLOFT) 50 MG tablet     tamsulosin (FLOMAX) 0.4 mg Cap TAKE 1 CAPSULE(0.4 MG) BY MOUTH EVERY DAY    venlafaxine (EFFEXOR-XR) 75 MG 24 hr capsule Take 1 capsule (75 mg total) by mouth once daily.    VITAMIN B-1 100 MG tablet TAKE 1 TABLET BY MOUTH ONCE DAILY    folic acid (FOLVITE) 1 MG tablet Take 1 tablet (1 mg total) by mouth once daily.    LORazepam (ATIVAN) 0.5 MG tablet Take 1 tablet (0.5 mg total) by mouth nightly as needed (for panic attacks. Do not combine with alcohol).    multivitamin-minerals-lutein (MULTIVITAMIN 50 PLUS) Tab Take 1 tablet by mouth once daily.     No  "current facility-administered medications on file prior to visit.     Social History:  Kleber reports that he has never smoked. He has never used smokeless tobacco. He reports current alcohol use of about 26.0 standard drinks per week. He reports that he does not use drugs.  Retired    Objective:   /72 (BP Location: Left arm, Patient Position: Sitting, BP Method: Medium (Automatic))   Pulse 72   Ht 5' 6" (1.676 m)   Wt 81 kg (178 lb 9.2 oz)   SpO2 97%   BMI 28.82 kg/m²     Physical Exam  Constitutional:       General: He is not in acute distress.     Appearance: He is not diaphoretic.   HENT:      Head: Normocephalic and atraumatic.      Mouth/Throat:      Pharynx: No oropharyngeal exudate.   Eyes:      General: No scleral icterus.  Neck:      Thyroid: No thyromegaly.      Vascular: No JVD.      Trachea: No tracheal deviation.   Cardiovascular:      Heart sounds: No murmur heard.    No friction rub. No gallop.      Comments: Tachycardic, irregularly irregular  Pulmonary:      Effort: Pulmonary effort is normal. No respiratory distress.      Breath sounds: Normal breath sounds. No wheezing or rales.   Chest:      Chest wall: No tenderness.   Abdominal:      General: There is no distension.      Palpations: Abdomen is soft.      Tenderness: There is no abdominal tenderness. There is no guarding or rebound.   Musculoskeletal:      Comments: Incision noted over right knee from prior knee replacement, no surrounding erythema.   Skin:     General: Skin is warm and dry.      Findings: No erythema.      Comments: Facial plethora   Neurological:      Mental Status: He is alert and oriented to person, place, and time.   Psychiatric:         Mood and Affect: Affect normal.     EKG:  My independent visualization of most recent EKG is normal sinus rhythm, nonspecific ST changes, borderline left atrial enlargement    TTE:  11/1/2021  The left ventricle is normal in size with mildly reduced systolic function. The " estimated ejection fraction is 45-50%.  Normal right ventricular size with low normal right ventricular systolic function.  Grade I left ventricular diastolic dysfunction.  Moderate left atrial enlargement.  Normal central venous pressure (3 mmHg).     03/15/2019  Low normal left ventricular systolic function. The estimated ejection fraction is 50%  Concentric left ventricular remodeling.  Septal wall has abnormal motion.  RV was not well visualized  Mild tricuspid regurgitation.  Atrial fibrillation observed.     10/12/2018  CONCLUSIONS     1 - Normal left ventricular systolic function (EF 60-65%).     2 - Biatrial enlargement.     3 - No wall motion abnormalities.     4 - Quantitatively measured LV function is 67%.     5 - Indeterminate LV diastolic function.     6 - Normal right ventricular systolic function .     7 - The estimated PA systolic pressure is greater than 24 mmHg.     8 - Mild tricuspid regurgitation.     Lipids:  Recent Labs   Lab 01/10/22  1003   LDL Cholesterol 79.6   HDL 30 L   Cholesterol 125        Holter   08/01/2018  PREDOMINANT RHYTHM  1. Sinus rhythm with heart rates varying between 44 and 82 bpm with an average of 55 bpm.     VENTRICULAR ARRHYTHMIAS  1. There were very rare PVCs recorded totalling 41 and averaging less than 1 per hour.     2. There were no episodes of ventricular tachycardia.    SUPRA VENTRICULAR ARRHYTHMIAS  1. There were very rare PACs recorded totalling 15 and averaging less than 1 per hour.  There were 3 couplets.    2. There were no episodes of sustained supraventricular tachycardia.        Assessment:     1. Paroxysmal atrial fibrillation    2. Typical atrial flutter    3. Sinus bradycardia    4. Chronic diastolic congestive heart failure    5. Aortic atherosclerosis    6. Hyperlipidemia associated with type 2 diabetes mellitus        Plan:   1. Paroxysmal atrial fibrillation  Now having recurrent breakthrough episodes which are significantly distressing to him, and  unable to tolerate daily beta-blockade thus daily 1C is not ideal.  He is more open to idea of ablation at this point.  Expressed again I think there is a relationship between ETOH and AF.  Will have him discuss potential PVI with Dr. Giang versus consideration of alternative antiarrhythmic.  Continue DOAC.  - Ambulatory referral/consult to Electrophysiology; Future    2. Typical atrial flutter  As above  - Ambulatory referral/consult to Cardiology    3. Sinus bradycardia  Patient reports cannot tolerate metoprolol with Sx and HR in 50s  - Ambulatory referral/consult to Cardiology    4. Chronic diastolic congestive heart failure  Appears to be euvolemic today, no indication for diuretic therapy, EF preserved.    5. Aortic atherosclerosis  Most recent LDL near 70, primary prevention, continue statin    6. Hyperlipidemia associated with type 2 diabetes mellitus  As above        F.u 1 yr

## 2022-11-11 ENCOUNTER — OFFICE VISIT (OUTPATIENT)
Dept: INTERNAL MEDICINE | Facility: CLINIC | Age: 67
End: 2022-11-11
Payer: MEDICARE

## 2022-11-11 ENCOUNTER — LAB VISIT (OUTPATIENT)
Dept: LAB | Facility: HOSPITAL | Age: 67
End: 2022-11-11
Attending: INTERNAL MEDICINE
Payer: MEDICARE

## 2022-11-11 VITALS
WEIGHT: 174.81 LBS | DIASTOLIC BLOOD PRESSURE: 76 MMHG | SYSTOLIC BLOOD PRESSURE: 128 MMHG | HEIGHT: 66 IN | OXYGEN SATURATION: 98 % | HEART RATE: 87 BPM | BODY MASS INDEX: 28.09 KG/M2

## 2022-11-11 DIAGNOSIS — Z90.5 S/P NEPHRECTOMY: ICD-10-CM

## 2022-11-11 DIAGNOSIS — F10.20 ALCOHOL USE DISORDER, MODERATE, DEPENDENCE: ICD-10-CM

## 2022-11-11 DIAGNOSIS — E11.69 TYPE 2 DIABETES MELLITUS WITH OTHER SPECIFIED COMPLICATION, WITHOUT LONG-TERM CURRENT USE OF INSULIN: ICD-10-CM

## 2022-11-11 DIAGNOSIS — I48.0 PAROXYSMAL ATRIAL FIBRILLATION: Primary | ICD-10-CM

## 2022-11-11 DIAGNOSIS — E78.5 HYPERLIPIDEMIA ASSOCIATED WITH TYPE 2 DIABETES MELLITUS: ICD-10-CM

## 2022-11-11 DIAGNOSIS — E11.69 HYPERLIPIDEMIA ASSOCIATED WITH TYPE 2 DIABETES MELLITUS: ICD-10-CM

## 2022-11-11 DIAGNOSIS — D69.6 THROMBOCYTOPENIA: ICD-10-CM

## 2022-11-11 DIAGNOSIS — F41.0 PANIC ATTACKS: ICD-10-CM

## 2022-11-11 LAB
ALBUMIN SERPL BCP-MCNC: 4.3 G/DL (ref 3.5–5.2)
ALP SERPL-CCNC: 68 U/L (ref 55–135)
ALT SERPL W/O P-5'-P-CCNC: 44 U/L (ref 10–44)
ANION GAP SERPL CALC-SCNC: 13 MMOL/L (ref 8–16)
AST SERPL-CCNC: 82 U/L (ref 10–40)
BASOPHILS # BLD AUTO: 0.06 K/UL (ref 0–0.2)
BASOPHILS NFR BLD: 1.1 % (ref 0–1.9)
BILIRUB SERPL-MCNC: 0.8 MG/DL (ref 0.1–1)
BUN SERPL-MCNC: 5 MG/DL (ref 8–23)
CALCIUM SERPL-MCNC: 9.1 MG/DL (ref 8.7–10.5)
CHLORIDE SERPL-SCNC: 105 MMOL/L (ref 95–110)
CHOLEST SERPL-MCNC: 123 MG/DL (ref 120–199)
CHOLEST/HDLC SERPL: 2.9 {RATIO} (ref 2–5)
CO2 SERPL-SCNC: 19 MMOL/L (ref 23–29)
CREAT SERPL-MCNC: 0.8 MG/DL (ref 0.5–1.4)
DIFFERENTIAL METHOD: ABNORMAL
EOSINOPHIL # BLD AUTO: 0.1 K/UL (ref 0–0.5)
EOSINOPHIL NFR BLD: 1.5 % (ref 0–8)
ERYTHROCYTE [DISTWIDTH] IN BLOOD BY AUTOMATED COUNT: 13.5 % (ref 11.5–14.5)
EST. GFR  (NO RACE VARIABLE): >60 ML/MIN/1.73 M^2
ESTIMATED AVG GLUCOSE: 100 MG/DL (ref 68–131)
GLUCOSE SERPL-MCNC: 94 MG/DL (ref 70–110)
HBA1C MFR BLD: 5.1 % (ref 4–5.6)
HCT VFR BLD AUTO: 40 % (ref 40–54)
HDLC SERPL-MCNC: 43 MG/DL (ref 40–75)
HDLC SERPL: 35 % (ref 20–50)
HGB BLD-MCNC: 13.3 G/DL (ref 14–18)
IMM GRANULOCYTES # BLD AUTO: 0.02 K/UL (ref 0–0.04)
IMM GRANULOCYTES NFR BLD AUTO: 0.4 % (ref 0–0.5)
LDLC SERPL CALC-MCNC: 37.4 MG/DL (ref 63–159)
LYMPHOCYTES # BLD AUTO: 1.3 K/UL (ref 1–4.8)
LYMPHOCYTES NFR BLD: 24.2 % (ref 18–48)
MCH RBC QN AUTO: 32.4 PG (ref 27–31)
MCHC RBC AUTO-ENTMCNC: 33.3 G/DL (ref 32–36)
MCV RBC AUTO: 98 FL (ref 82–98)
MONOCYTES # BLD AUTO: 0.6 K/UL (ref 0.3–1)
MONOCYTES NFR BLD: 10.4 % (ref 4–15)
NEUTROPHILS # BLD AUTO: 3.3 K/UL (ref 1.8–7.7)
NEUTROPHILS NFR BLD: 62.4 % (ref 38–73)
NONHDLC SERPL-MCNC: 80 MG/DL
NRBC BLD-RTO: 0 /100 WBC
PLATELET # BLD AUTO: 121 K/UL (ref 150–450)
PMV BLD AUTO: 9.8 FL (ref 9.2–12.9)
POTASSIUM SERPL-SCNC: 3.4 MMOL/L (ref 3.5–5.1)
PROT SERPL-MCNC: 7.5 G/DL (ref 6–8.4)
RBC # BLD AUTO: 4.1 M/UL (ref 4.6–6.2)
SODIUM SERPL-SCNC: 137 MMOL/L (ref 136–145)
TRIGL SERPL-MCNC: 213 MG/DL (ref 30–150)
WBC # BLD AUTO: 5.28 K/UL (ref 3.9–12.7)

## 2022-11-11 PROCEDURE — 3078F PR MOST RECENT DIASTOLIC BLOOD PRESSURE < 80 MM HG: ICD-10-PCS | Mod: CPTII,S$GLB,, | Performed by: INTERNAL MEDICINE

## 2022-11-11 PROCEDURE — 1160F RVW MEDS BY RX/DR IN RCRD: CPT | Mod: CPTII,S$GLB,, | Performed by: INTERNAL MEDICINE

## 2022-11-11 PROCEDURE — 80061 LIPID PANEL: CPT | Performed by: INTERNAL MEDICINE

## 2022-11-11 PROCEDURE — 1126F PR PAIN SEVERITY QUANTIFIED, NO PAIN PRESENT: ICD-10-PCS | Mod: CPTII,S$GLB,, | Performed by: INTERNAL MEDICINE

## 2022-11-11 PROCEDURE — 3078F DIAST BP <80 MM HG: CPT | Mod: CPTII,S$GLB,, | Performed by: INTERNAL MEDICINE

## 2022-11-11 PROCEDURE — 3008F BODY MASS INDEX DOCD: CPT | Mod: CPTII,S$GLB,, | Performed by: INTERNAL MEDICINE

## 2022-11-11 PROCEDURE — 1159F MED LIST DOCD IN RCRD: CPT | Mod: CPTII,S$GLB,, | Performed by: INTERNAL MEDICINE

## 2022-11-11 PROCEDURE — 3008F PR BODY MASS INDEX (BMI) DOCUMENTED: ICD-10-PCS | Mod: CPTII,S$GLB,, | Performed by: INTERNAL MEDICINE

## 2022-11-11 PROCEDURE — 99999 PR PBB SHADOW E&M-EST. PATIENT-LVL V: CPT | Mod: PBBFAC,,, | Performed by: INTERNAL MEDICINE

## 2022-11-11 PROCEDURE — 99215 OFFICE O/P EST HI 40 MIN: CPT | Mod: S$GLB,,, | Performed by: INTERNAL MEDICINE

## 2022-11-11 PROCEDURE — 1101F PR PT FALLS ASSESS DOC 0-1 FALLS W/OUT INJ PAST YR: ICD-10-PCS | Mod: CPTII,S$GLB,, | Performed by: INTERNAL MEDICINE

## 2022-11-11 PROCEDURE — 3288F PR FALLS RISK ASSESSMENT DOCUMENTED: ICD-10-PCS | Mod: CPTII,S$GLB,, | Performed by: INTERNAL MEDICINE

## 2022-11-11 PROCEDURE — 85025 COMPLETE CBC W/AUTO DIFF WBC: CPT | Performed by: INTERNAL MEDICINE

## 2022-11-11 PROCEDURE — 36415 COLL VENOUS BLD VENIPUNCTURE: CPT | Performed by: INTERNAL MEDICINE

## 2022-11-11 PROCEDURE — 3074F PR MOST RECENT SYSTOLIC BLOOD PRESSURE < 130 MM HG: ICD-10-PCS | Mod: CPTII,S$GLB,, | Performed by: INTERNAL MEDICINE

## 2022-11-11 PROCEDURE — 1160F PR REVIEW ALL MEDS BY PRESCRIBER/CLIN PHARMACIST DOCUMENTED: ICD-10-PCS | Mod: CPTII,S$GLB,, | Performed by: INTERNAL MEDICINE

## 2022-11-11 PROCEDURE — 3074F SYST BP LT 130 MM HG: CPT | Mod: CPTII,S$GLB,, | Performed by: INTERNAL MEDICINE

## 2022-11-11 PROCEDURE — 3044F HG A1C LEVEL LT 7.0%: CPT | Mod: CPTII,S$GLB,, | Performed by: INTERNAL MEDICINE

## 2022-11-11 PROCEDURE — 99215 PR OFFICE/OUTPT VISIT, EST, LEVL V, 40-54 MIN: ICD-10-PCS | Mod: S$GLB,,, | Performed by: INTERNAL MEDICINE

## 2022-11-11 PROCEDURE — 3072F PR LOW RISK FOR RETINOPATHY: ICD-10-PCS | Mod: CPTII,S$GLB,, | Performed by: INTERNAL MEDICINE

## 2022-11-11 PROCEDURE — 1159F PR MEDICATION LIST DOCUMENTED IN MEDICAL RECORD: ICD-10-PCS | Mod: CPTII,S$GLB,, | Performed by: INTERNAL MEDICINE

## 2022-11-11 PROCEDURE — 1101F PT FALLS ASSESS-DOCD LE1/YR: CPT | Mod: CPTII,S$GLB,, | Performed by: INTERNAL MEDICINE

## 2022-11-11 PROCEDURE — 83036 HEMOGLOBIN GLYCOSYLATED A1C: CPT | Performed by: INTERNAL MEDICINE

## 2022-11-11 PROCEDURE — 3072F LOW RISK FOR RETINOPATHY: CPT | Mod: CPTII,S$GLB,, | Performed by: INTERNAL MEDICINE

## 2022-11-11 PROCEDURE — 1126F AMNT PAIN NOTED NONE PRSNT: CPT | Mod: CPTII,S$GLB,, | Performed by: INTERNAL MEDICINE

## 2022-11-11 PROCEDURE — 80053 COMPREHEN METABOLIC PANEL: CPT | Performed by: INTERNAL MEDICINE

## 2022-11-11 PROCEDURE — 99999 PR PBB SHADOW E&M-EST. PATIENT-LVL V: ICD-10-PCS | Mod: PBBFAC,,, | Performed by: INTERNAL MEDICINE

## 2022-11-11 PROCEDURE — 3288F FALL RISK ASSESSMENT DOCD: CPT | Mod: CPTII,S$GLB,, | Performed by: INTERNAL MEDICINE

## 2022-11-11 PROCEDURE — 3044F PR MOST RECENT HEMOGLOBIN A1C LEVEL <7.0%: ICD-10-PCS | Mod: CPTII,S$GLB,, | Performed by: INTERNAL MEDICINE

## 2022-11-11 RX ORDER — LORAZEPAM 0.5 MG/1
0.5 TABLET ORAL NIGHTLY PRN
Qty: 15 TABLET | Refills: 1 | Status: SHIPPED | OUTPATIENT
Start: 2022-11-11 | End: 2022-12-15

## 2022-11-11 NOTE — PROGRESS NOTES
CC:  Follow-up     HPI:  The patient is a 67-year-old male with PAF, EtOH use, hypertension, hyperlipidemia with elevated triglycerides, non insulin-dependent diabetes, reflux, gout, BPH, renal cell cancer status post nephrectomy and anxiety who presents today for follow-up.  The patient stop drinking alcohol for a few years with restarted about a year ago.  He is currently drinking 12 beers a day, more if he becomes anxious.  He is on Effexor; but, does not take it on a daily basis.  He does use lorazepam on occasion.  The patient had been seeing Psychiatry but does not want to go back.  Patient does have PAF.  He would seen Cardiology recently and ablation was discussed according to the patient.  The patient is post to be on flecainide once a day and metoprolol daily.  The patient has been taking flecainide almost daily.  When he has an episode of AFib, he will take a flecainide and a half of metoprolol.    ROS:  Patient reports his blood sugars range usually between 101 and 105.  The lowest has been was 80.  No palpitations currently.  He does report some anxiety currently.  The last of his brothers  2 weeks ago at 76 years of age.  All his brothers and sisters are now  as well as his parents.  This has just started to sink in.  The patient is also concerned about his atrial fibrillation.  He discussed ablation with his daughter.  She told him that as long as he continued to drink alcohol as he does, he can go back into AFib after the ablation.    Physical exam:   General appearance:  No acute distress   HEENT: Conjunctiva is clear.  Pupils equal.  TMs are obscured by wax.  Nasal septum is midline without discharge.  Oropharynx is without erythema.  Trachea is midline without JVD.    Pulmonary:  Good inspiratory, expiratory breath sounds are heard.  Lungs are clear auscultation.    Cardiovascular:  S1-S2, rhythm is normal.  Extremities without edema.    GI: Abdomen is nontender, nondistended without  hepatosplenomegaly    Assessment:  1.  ETOH use  2.  PAF  3.  Anxiety  4. Non insulin-dependent diabetes  5. Elevated triglycerides  7.  Gout  8.  History of renal cell cancer     Plan:  1. Will check a CBC, CMP, A1c and lipid panel   2. I did discuss with the patient that I agree with his daughter.  Also discussed with him that I believe the source was AFib is his alcohol use.  3. Also discussed with the patient he needs to take his flecainide on a daily basis.  It was discussed they should also take metoprolol.  The patient is very concerned that this resulted in bradycardia which put him in the hospital.  I do not think the patient will take it unless he has an episode of AFib.

## 2022-11-16 ENCOUNTER — TELEPHONE (OUTPATIENT)
Dept: ELECTROPHYSIOLOGY | Facility: CLINIC | Age: 67
End: 2022-11-16
Payer: MEDICARE

## 2022-11-16 NOTE — TELEPHONE ENCOUNTER
----- Message from Christen Logan sent at 11/16/2022  2:48 PM CST -----  Regarding: Appt  Contact: [phone]  Patient Kleber is calling. Patient stated he received a message to come into the office tomorrow for 8:30. Patient cant make that appt. Please call patient at [phone]    Thank You

## 2022-11-29 ENCOUNTER — HOSPITAL ENCOUNTER (OUTPATIENT)
Facility: HOSPITAL | Age: 67
Discharge: HOME | End: 2022-11-30
Attending: EMERGENCY MEDICINE | Admitting: HOSPITALIST
Payer: MEDICARE

## 2022-11-29 DIAGNOSIS — E87.29 HIGH ANION GAP METABOLIC ACIDOSIS: ICD-10-CM

## 2022-11-29 DIAGNOSIS — R55 SYNCOPE: ICD-10-CM

## 2022-11-29 DIAGNOSIS — R55 SYNCOPE AND COLLAPSE: ICD-10-CM

## 2022-11-29 DIAGNOSIS — Z78.9 ALCOHOL USE: ICD-10-CM

## 2022-11-29 DIAGNOSIS — R07.9 CHEST PAIN: ICD-10-CM

## 2022-11-29 DIAGNOSIS — I48.91 ATRIAL FIBRILLATION, UNSPECIFIED TYPE: Primary | ICD-10-CM

## 2022-11-29 PROBLEM — E83.42 HYPOMAGNESEMIA: Status: ACTIVE | Noted: 2022-11-29

## 2022-11-29 PROBLEM — E11.9 TYPE 2 DIABETES MELLITUS WITHOUT COMPLICATION, WITHOUT LONG-TERM CURRENT USE OF INSULIN: Status: ACTIVE | Noted: 2019-10-07

## 2022-11-29 LAB
ALBUMIN SERPL BCP-MCNC: 4 G/DL (ref 3.5–5.2)
ALP SERPL-CCNC: 68 U/L (ref 55–135)
ALT SERPL W/O P-5'-P-CCNC: 25 U/L (ref 10–44)
AMPHET+METHAMPHET UR QL: NEGATIVE
ANION GAP SERPL CALC-SCNC: 23 MMOL/L (ref 8–16)
AST SERPL-CCNC: 60 U/L (ref 10–40)
B-OH-BUTYR BLD STRIP-SCNC: 0.9 MMOL/L (ref 0–0.5)
BARBITURATES UR QL SCN>200 NG/ML: NEGATIVE
BASOPHILS # BLD AUTO: 0.06 K/UL (ref 0–0.2)
BASOPHILS NFR BLD: 0.7 % (ref 0–1.9)
BENZODIAZ UR QL SCN>200 NG/ML: NEGATIVE
BILIRUB SERPL-MCNC: 0.4 MG/DL (ref 0.1–1)
BILIRUB UR QL STRIP: NEGATIVE
BNP SERPL-MCNC: 324 PG/ML (ref 0–99)
BUN SERPL-MCNC: 6 MG/DL (ref 6–30)
BUN SERPL-MCNC: 7 MG/DL (ref 8–23)
BZE UR QL SCN: NEGATIVE
CALCIUM SERPL-MCNC: 9.1 MG/DL (ref 8.7–10.5)
CANNABINOIDS UR QL SCN: NEGATIVE
CHLORIDE SERPL-SCNC: 100 MMOL/L (ref 95–110)
CHLORIDE SERPL-SCNC: 103 MMOL/L (ref 95–110)
CLARITY UR REFRACT.AUTO: CLEAR
CO2 SERPL-SCNC: 13 MMOL/L (ref 23–29)
COLOR UR AUTO: COLORLESS
CREAT SERPL-MCNC: 0.8 MG/DL (ref 0.5–1.4)
CREAT SERPL-MCNC: 1 MG/DL (ref 0.5–1.4)
CREAT UR-MCNC: 42 MG/DL (ref 23–375)
DIFFERENTIAL METHOD: ABNORMAL
EOSINOPHIL # BLD AUTO: 0 K/UL (ref 0–0.5)
EOSINOPHIL NFR BLD: 0.3 % (ref 0–8)
ERYTHROCYTE [DISTWIDTH] IN BLOOD BY AUTOMATED COUNT: 13.6 % (ref 11.5–14.5)
EST. GFR  (NO RACE VARIABLE): >60 ML/MIN/1.73 M^2
ETHANOL SERPL-MCNC: <10 MG/DL
ETHANOL UR-MCNC: 68 MG/DL
GLUCOSE SERPL-MCNC: 102 MG/DL (ref 70–110)
GLUCOSE SERPL-MCNC: 117 MG/DL (ref 70–110)
GLUCOSE UR QL STRIP: NEGATIVE
HCT VFR BLD AUTO: 47.9 % (ref 40–54)
HCT VFR BLD CALC: 47 %PCV (ref 36–54)
HGB BLD-MCNC: 16.5 G/DL (ref 14–18)
HGB UR QL STRIP: NEGATIVE
IMM GRANULOCYTES # BLD AUTO: 0.03 K/UL (ref 0–0.04)
IMM GRANULOCYTES NFR BLD AUTO: 0.3 % (ref 0–0.5)
INFLUENZA A, MOLECULAR: NOT DETECTED
INFLUENZA B, MOLECULAR: NOT DETECTED
KETONES UR QL STRIP: NEGATIVE
LACTATE SERPL-SCNC: 2.5 MMOL/L (ref 0.5–2.2)
LACTATE SERPL-SCNC: 5.7 MMOL/L (ref 0.5–2.2)
LEUKOCYTE ESTERASE UR QL STRIP: NEGATIVE
LYMPHOCYTES # BLD AUTO: 2.2 K/UL (ref 1–4.8)
LYMPHOCYTES NFR BLD: 25.1 % (ref 18–48)
MAGNESIUM SERPL-MCNC: 1.2 MG/DL (ref 1.6–2.6)
MCH RBC QN AUTO: 32.6 PG (ref 27–31)
MCHC RBC AUTO-ENTMCNC: 34.4 G/DL (ref 32–36)
MCV RBC AUTO: 95 FL (ref 82–98)
METHADONE UR QL SCN>300 NG/ML: NEGATIVE
MONOCYTES # BLD AUTO: 0.6 K/UL (ref 0.3–1)
MONOCYTES NFR BLD: 6.8 % (ref 4–15)
NEUTROPHILS # BLD AUTO: 5.8 K/UL (ref 1.8–7.7)
NEUTROPHILS NFR BLD: 66.8 % (ref 38–73)
NITRITE UR QL STRIP: NEGATIVE
NRBC BLD-RTO: 0 /100 WBC
OPIATES UR QL SCN: NEGATIVE
OSMOLALITY SERPL: 316 MOSM/KG (ref 280–300)
PCP UR QL SCN>25 NG/ML: NEGATIVE
PH UR STRIP: 5 [PH] (ref 5–8)
PLATELET # BLD AUTO: 199 K/UL (ref 150–450)
PMV BLD AUTO: 8.5 FL (ref 9.2–12.9)
POC IONIZED CALCIUM: 0.94 MMOL/L (ref 1.06–1.42)
POC TCO2 (MEASURED): 17 MMOL/L (ref 23–29)
POCT GLUCOSE: 108 MG/DL (ref 70–110)
POTASSIUM BLD-SCNC: 4.3 MMOL/L (ref 3.5–5.1)
POTASSIUM SERPL-SCNC: 3.6 MMOL/L (ref 3.5–5.1)
PROT SERPL-MCNC: 7.5 G/DL (ref 6–8.4)
PROT UR QL STRIP: NEGATIVE
RBC # BLD AUTO: 5.06 M/UL (ref 4.6–6.2)
RSV AG BY MOLECULAR METHOD: NOT DETECTED
SAMPLE: ABNORMAL
SARS-COV-2 RNA RESP QL NAA+PROBE: NOT DETECTED
SODIUM BLD-SCNC: 134 MMOL/L (ref 136–145)
SODIUM SERPL-SCNC: 136 MMOL/L (ref 136–145)
SP GR UR STRIP: 1.01 (ref 1–1.03)
T4 FREE SERPL-MCNC: 0.71 NG/DL (ref 0.71–1.51)
TOXICOLOGY INFORMATION: ABNORMAL
TROPONIN I SERPL DL<=0.01 NG/ML-MCNC: <0.006 NG/ML (ref 0–0.03)
TSH SERPL DL<=0.005 MIU/L-ACNC: 0.17 UIU/ML (ref 0.4–4)
URN SPEC COLLECT METH UR: ABNORMAL
WBC # BLD AUTO: 8.62 K/UL (ref 3.9–12.7)

## 2022-11-29 PROCEDURE — 85025 COMPLETE CBC W/AUTO DIFF WBC: CPT | Performed by: PHYSICIAN ASSISTANT

## 2022-11-29 PROCEDURE — 93010 EKG 12-LEAD: ICD-10-PCS | Mod: ,,, | Performed by: INTERNAL MEDICINE

## 2022-11-29 PROCEDURE — 99220 PR INITIAL OBSERVATION CARE,LEVL III: CPT | Mod: ,,, | Performed by: HOSPITALIST

## 2022-11-29 PROCEDURE — 99291 CRITICAL CARE FIRST HOUR: CPT | Mod: ,,, | Performed by: PHYSICIAN ASSISTANT

## 2022-11-29 PROCEDURE — G0378 HOSPITAL OBSERVATION PER HR: HCPCS

## 2022-11-29 PROCEDURE — 82010 KETONE BODYS QUAN: CPT | Performed by: PHYSICIAN ASSISTANT

## 2022-11-29 PROCEDURE — 96361 HYDRATE IV INFUSION ADD-ON: CPT

## 2022-11-29 PROCEDURE — 96366 THER/PROPH/DIAG IV INF ADDON: CPT

## 2022-11-29 PROCEDURE — 80307 DRUG TEST PRSMV CHEM ANLYZR: CPT | Performed by: HOSPITALIST

## 2022-11-29 PROCEDURE — 83880 ASSAY OF NATRIURETIC PEPTIDE: CPT | Performed by: PHYSICIAN ASSISTANT

## 2022-11-29 PROCEDURE — 25000003 PHARM REV CODE 250: Performed by: HOSPITALIST

## 2022-11-29 PROCEDURE — 84443 ASSAY THYROID STIM HORMONE: CPT | Performed by: PHYSICIAN ASSISTANT

## 2022-11-29 PROCEDURE — 96375 TX/PRO/DX INJ NEW DRUG ADDON: CPT

## 2022-11-29 PROCEDURE — 93010 ELECTROCARDIOGRAM REPORT: CPT | Mod: ,,, | Performed by: INTERNAL MEDICINE

## 2022-11-29 PROCEDURE — 99220 PR INITIAL OBSERVATION CARE,LEVL III: ICD-10-PCS | Mod: ,,, | Performed by: HOSPITALIST

## 2022-11-29 PROCEDURE — 82962 GLUCOSE BLOOD TEST: CPT

## 2022-11-29 PROCEDURE — 99291 PR CRITICAL CARE, E/M 30-74 MINUTES: ICD-10-PCS | Mod: ,,, | Performed by: PHYSICIAN ASSISTANT

## 2022-11-29 PROCEDURE — 80053 COMPREHEN METABOLIC PANEL: CPT | Performed by: PHYSICIAN ASSISTANT

## 2022-11-29 PROCEDURE — 25000003 PHARM REV CODE 250: Performed by: PHYSICIAN ASSISTANT

## 2022-11-29 PROCEDURE — 93005 ELECTROCARDIOGRAM TRACING: CPT

## 2022-11-29 PROCEDURE — 83605 ASSAY OF LACTIC ACID: CPT | Performed by: PHYSICIAN ASSISTANT

## 2022-11-29 PROCEDURE — 82077 ASSAY SPEC XCP UR&BREATH IA: CPT | Performed by: PHYSICIAN ASSISTANT

## 2022-11-29 PROCEDURE — 84484 ASSAY OF TROPONIN QUANT: CPT | Performed by: PHYSICIAN ASSISTANT

## 2022-11-29 PROCEDURE — 0241U SARS-COV2 (COVID) WITH FLU/RSV BY PCR: CPT | Performed by: PHYSICIAN ASSISTANT

## 2022-11-29 PROCEDURE — 84439 ASSAY OF FREE THYROXINE: CPT | Performed by: PHYSICIAN ASSISTANT

## 2022-11-29 PROCEDURE — 99285 EMERGENCY DEPT VISIT HI MDM: CPT | Mod: 25,CS

## 2022-11-29 PROCEDURE — 83930 ASSAY OF BLOOD OSMOLALITY: CPT | Performed by: PHYSICIAN ASSISTANT

## 2022-11-29 PROCEDURE — 63600175 PHARM REV CODE 636 W HCPCS: Performed by: HOSPITALIST

## 2022-11-29 PROCEDURE — 63600175 PHARM REV CODE 636 W HCPCS: Performed by: PHYSICIAN ASSISTANT

## 2022-11-29 PROCEDURE — 96365 THER/PROPH/DIAG IV INF INIT: CPT

## 2022-11-29 PROCEDURE — 80047 BASIC METABLC PNL IONIZED CA: CPT | Mod: 59

## 2022-11-29 PROCEDURE — 81003 URINALYSIS AUTO W/O SCOPE: CPT | Performed by: PHYSICIAN ASSISTANT

## 2022-11-29 PROCEDURE — 83735 ASSAY OF MAGNESIUM: CPT | Performed by: PHYSICIAN ASSISTANT

## 2022-11-29 RX ORDER — FLECAINIDE ACETATE 100 MG/1
100 TABLET ORAL
Status: COMPLETED | OUTPATIENT
Start: 2022-11-29 | End: 2022-11-29

## 2022-11-29 RX ORDER — IBUPROFEN 200 MG
16 TABLET ORAL
Status: DISCONTINUED | OUTPATIENT
Start: 2022-11-29 | End: 2022-11-30 | Stop reason: HOSPADM

## 2022-11-29 RX ORDER — IBUPROFEN 200 MG
24 TABLET ORAL
Status: DISCONTINUED | OUTPATIENT
Start: 2022-11-29 | End: 2022-11-30 | Stop reason: HOSPADM

## 2022-11-29 RX ORDER — INSULIN ASPART 100 [IU]/ML
0-5 INJECTION, SOLUTION INTRAVENOUS; SUBCUTANEOUS
Status: DISCONTINUED | OUTPATIENT
Start: 2022-11-29 | End: 2022-11-30 | Stop reason: HOSPADM

## 2022-11-29 RX ORDER — TAMSULOSIN HYDROCHLORIDE 0.4 MG/1
0.4 CAPSULE ORAL DAILY
Status: DISCONTINUED | OUTPATIENT
Start: 2022-11-30 | End: 2022-11-30 | Stop reason: HOSPADM

## 2022-11-29 RX ORDER — ATORVASTATIN CALCIUM 40 MG/1
40 TABLET, FILM COATED ORAL DAILY
Status: DISCONTINUED | OUTPATIENT
Start: 2022-11-30 | End: 2022-11-30 | Stop reason: HOSPADM

## 2022-11-29 RX ORDER — POLYETHYLENE GLYCOL 3350 17 G/17G
17 POWDER, FOR SOLUTION ORAL DAILY
Status: DISCONTINUED | OUTPATIENT
Start: 2022-11-30 | End: 2022-11-30 | Stop reason: HOSPADM

## 2022-11-29 RX ORDER — FOLIC ACID 1 MG/1
1 TABLET ORAL DAILY
Status: DISCONTINUED | OUTPATIENT
Start: 2022-11-30 | End: 2022-11-29

## 2022-11-29 RX ORDER — LANOLIN ALCOHOL/MO/W.PET/CERES
1000 CREAM (GRAM) TOPICAL DAILY
Status: DISCONTINUED | OUTPATIENT
Start: 2022-11-30 | End: 2022-11-30 | Stop reason: HOSPADM

## 2022-11-29 RX ORDER — METOPROLOL TARTRATE 1 MG/ML
5 INJECTION, SOLUTION INTRAVENOUS
Status: COMPLETED | OUTPATIENT
Start: 2022-11-29 | End: 2022-11-29

## 2022-11-29 RX ORDER — GLUCAGON 1 MG
1 KIT INJECTION
Status: DISCONTINUED | OUTPATIENT
Start: 2022-11-29 | End: 2022-11-30 | Stop reason: HOSPADM

## 2022-11-29 RX ORDER — VENLAFAXINE HYDROCHLORIDE 75 MG/1
75 CAPSULE, EXTENDED RELEASE ORAL DAILY
Status: DISCONTINUED | OUTPATIENT
Start: 2022-11-30 | End: 2022-11-29

## 2022-11-29 RX ORDER — NALOXONE HCL 0.4 MG/ML
0.02 VIAL (ML) INJECTION
Status: DISCONTINUED | OUTPATIENT
Start: 2022-11-29 | End: 2022-11-30 | Stop reason: HOSPADM

## 2022-11-29 RX ORDER — MAGNESIUM SULFATE HEPTAHYDRATE 40 MG/ML
2 INJECTION, SOLUTION INTRAVENOUS
Status: COMPLETED | OUTPATIENT
Start: 2022-11-29 | End: 2022-11-29

## 2022-11-29 RX ORDER — SODIUM CHLORIDE 0.9 % (FLUSH) 0.9 %
10 SYRINGE (ML) INJECTION EVERY 12 HOURS PRN
Status: DISCONTINUED | OUTPATIENT
Start: 2022-11-29 | End: 2022-11-30 | Stop reason: HOSPADM

## 2022-11-29 RX ORDER — THIAMINE HCL 100 MG
100 TABLET ORAL DAILY
Status: DISCONTINUED | OUTPATIENT
Start: 2022-11-30 | End: 2022-11-30 | Stop reason: HOSPADM

## 2022-11-29 RX ORDER — PANTOPRAZOLE SODIUM 40 MG/1
40 TABLET, DELAYED RELEASE ORAL DAILY
Status: DISCONTINUED | OUTPATIENT
Start: 2022-11-30 | End: 2022-11-30 | Stop reason: HOSPADM

## 2022-11-29 RX ORDER — DIAZEPAM 5 MG/1
10 TABLET ORAL EVERY 6 HOURS PRN
Status: DISCONTINUED | OUTPATIENT
Start: 2022-11-29 | End: 2022-11-30 | Stop reason: HOSPADM

## 2022-11-29 RX ORDER — ACETAMINOPHEN 325 MG/1
650 TABLET ORAL EVERY 4 HOURS PRN
Status: DISCONTINUED | OUTPATIENT
Start: 2022-11-29 | End: 2022-11-30 | Stop reason: HOSPADM

## 2022-11-29 RX ORDER — MAG HYDROX/ALUMINUM HYD/SIMETH 200-200-20
30 SUSPENSION, ORAL (FINAL DOSE FORM) ORAL 4 TIMES DAILY PRN
Status: DISCONTINUED | OUTPATIENT
Start: 2022-11-29 | End: 2022-11-30 | Stop reason: HOSPADM

## 2022-11-29 RX ORDER — ONDANSETRON 8 MG/1
8 TABLET, ORALLY DISINTEGRATING ORAL EVERY 8 HOURS PRN
Status: DISCONTINUED | OUTPATIENT
Start: 2022-11-29 | End: 2022-11-30 | Stop reason: HOSPADM

## 2022-11-29 RX ORDER — METOPROLOL SUCCINATE 25 MG/1
25 TABLET, EXTENDED RELEASE ORAL DAILY
Status: DISCONTINUED | OUTPATIENT
Start: 2022-11-30 | End: 2022-11-29

## 2022-11-29 RX ORDER — FLECAINIDE ACETATE 50 MG/1
100 TABLET ORAL EVERY 12 HOURS
Status: DISCONTINUED | OUTPATIENT
Start: 2022-11-29 | End: 2022-11-30 | Stop reason: HOSPADM

## 2022-11-29 RX ORDER — IPRATROPIUM BROMIDE AND ALBUTEROL SULFATE 2.5; .5 MG/3ML; MG/3ML
3 SOLUTION RESPIRATORY (INHALATION) EVERY 6 HOURS PRN
Status: DISCONTINUED | OUTPATIENT
Start: 2022-11-29 | End: 2022-11-30 | Stop reason: HOSPADM

## 2022-11-29 RX ORDER — DIAZEPAM 5 MG/1
10 TABLET ORAL EVERY 8 HOURS
Status: DISCONTINUED | OUTPATIENT
Start: 2022-11-29 | End: 2022-11-30 | Stop reason: HOSPADM

## 2022-11-29 RX ORDER — METOPROLOL TARTRATE 50 MG/1
50 TABLET ORAL 3 TIMES DAILY
Status: DISCONTINUED | OUTPATIENT
Start: 2022-11-29 | End: 2022-11-29

## 2022-11-29 RX ORDER — SODIUM CHLORIDE, SODIUM LACTATE, POTASSIUM CHLORIDE, CALCIUM CHLORIDE 600; 310; 30; 20 MG/100ML; MG/100ML; MG/100ML; MG/100ML
INJECTION, SOLUTION INTRAVENOUS CONTINUOUS
Status: DISCONTINUED | OUTPATIENT
Start: 2022-11-29 | End: 2022-11-30

## 2022-11-29 RX ORDER — HYDROXYZINE HYDROCHLORIDE 25 MG/1
25 TABLET, FILM COATED ORAL 3 TIMES DAILY PRN
Status: DISCONTINUED | OUTPATIENT
Start: 2022-11-29 | End: 2022-11-30 | Stop reason: HOSPADM

## 2022-11-29 RX ORDER — ALLOPURINOL 100 MG/1
200 TABLET ORAL DAILY
Status: DISCONTINUED | OUTPATIENT
Start: 2022-11-30 | End: 2022-11-30 | Stop reason: HOSPADM

## 2022-11-29 RX ORDER — TALC
6 POWDER (GRAM) TOPICAL NIGHTLY PRN
Status: DISCONTINUED | OUTPATIENT
Start: 2022-11-29 | End: 2022-11-30 | Stop reason: HOSPADM

## 2022-11-29 RX ORDER — METOPROLOL TARTRATE 50 MG/1
50 TABLET ORAL 2 TIMES DAILY
Status: DISCONTINUED | OUTPATIENT
Start: 2022-11-29 | End: 2022-11-30 | Stop reason: HOSPADM

## 2022-11-29 RX ORDER — ROPINIROLE 0.25 MG/1
1 TABLET, FILM COATED ORAL EVERY EVENING
Status: DISCONTINUED | OUTPATIENT
Start: 2022-11-29 | End: 2022-11-30 | Stop reason: HOSPADM

## 2022-11-29 RX ORDER — DIAZEPAM 5 MG/1
5 TABLET ORAL
Status: COMPLETED | OUTPATIENT
Start: 2022-11-29 | End: 2022-11-29

## 2022-11-29 RX ADMIN — MAGNESIUM SULFATE 2 G: 2 INJECTION INTRAVENOUS at 03:11

## 2022-11-29 RX ADMIN — METOPROLOL TARTRATE 50 MG: 50 TABLET, FILM COATED ORAL at 11:11

## 2022-11-29 RX ADMIN — METOROPROLOL TARTRATE 5 MG: 5 INJECTION, SOLUTION INTRAVENOUS at 03:11

## 2022-11-29 RX ADMIN — APIXABAN 5 MG: 5 TABLET, FILM COATED ORAL at 11:11

## 2022-11-29 RX ADMIN — DIAZEPAM 5 MG: 5 TABLET ORAL at 04:11

## 2022-11-29 RX ADMIN — FLECAINIDE ACETATE 100 MG: 100 TABLET ORAL at 02:11

## 2022-11-29 RX ADMIN — SODIUM CHLORIDE 1000 ML: 0.9 INJECTION, SOLUTION INTRAVENOUS at 01:11

## 2022-11-29 RX ADMIN — FLECAINIDE ACETATE 100 MG: 50 TABLET ORAL at 11:11

## 2022-11-29 RX ADMIN — SODIUM CHLORIDE, SODIUM LACTATE, POTASSIUM CHLORIDE, AND CALCIUM CHLORIDE: .6; .31; .03; .02 INJECTION, SOLUTION INTRAVENOUS at 07:11

## 2022-11-29 RX ADMIN — ROPINIROLE HYDROCHLORIDE 1 MG: 0.25 TABLET, FILM COATED ORAL at 11:11

## 2022-11-29 RX ADMIN — DIAZEPAM 10 MG: 5 TABLET ORAL at 11:11

## 2022-11-29 NOTE — ED PROVIDER NOTES
"Encounter Date: 11/29/2022       History     Chief Complaint   Patient presents with    Loss of Consciousness     Was paying his bill at the dental office and had syncope episode. Pt currently awake and alert, AAOx4     The history is provided by the patient and medical records. No  was used.     Donald Warren Abadie is a 67 y.o. male with medical history of PAFib on Eliquis, CHF (EF 45-50), ETOH abuse presenting to the ED with the chief complaint of syncopal episode.     Patient experienced a syncopal episode at the dentist office this morning. Reports having a cap placed for a R back molar and had local anesthesia for the procedure. Reports feeling weak when he was standing at the desk to pay his bill.  noticed he appeared pale and lowered him to the ground. Denies falling to the ground or hitting his head. Dentist gave him a brownie due to concern of his blood sugar. Received 100cc IVF with EMS during transit. Reports feeling generalized weakness at the time of my exam. Reports daily ETOH use in the form of beer. Estimates to drink 10-15 beers a day. Last beer was this morning. Reports having 1 episode of non-bloody diarrhea today. He did not eat anything today. He did not take Metoprolol today. Denies headache, neck pain, chest pain, SOB, abdominal pain, back pain, numbness, paresthesias, extremity weakness.     Review of patient's allergies indicates:   Allergen Reactions    No known drug allergies      Past Medical History:   Diagnosis Date    A-fib     Alcohol abuse     Anxiety     Arthritis     Chronic gout     Diabetes mellitus     DM (diabetes mellitus) 11/16/2016    "boarderline, not taking any meds currently"    Fatty liver     Hyperlipidemia     Renal cell cancer 2014    S/P TKR (total knee replacement), right 6/27/2019     Past Surgical History:   Procedure Laterality Date    ABSCESS DRAINAGE      perirectal    COLONOSCOPY      HAND SURGERY Left     JOINT REPLACEMENT   "    knee replacement right knee    KIDNEY SURGERY      partial left kidney removal - [place]    PARTIAL NEPHRECTOMY Left August 2014    PERCUTANEOUS CRYOTHERAPY OF PERIPHERAL NERVE USING LIQUID NITROUS OXIDE IN CLOSED NEEDLE DEVICE Right 6/17/2019    Procedure: CRYOTHERAPY, NERVE, PERIPHERAL, PERCUTANEOUS, USING LIQUID NITROUS OXIDE IN CLOSED NEEDLE DEVICE-right knee iovera;  Surgeon: Donny Hair III, MD;  Location: [facility] CATH LAB;  Service: Pain Management;  Laterality: Right;    TOTAL KNEE ARTHROPLASTY Right 6/27/2019    Procedure: ARTHROPLASTY, KNEE, TOTAL-SAME DAY;  Surgeon: Mikael Huerta MD;  [facility];  Service: Orthopedics;  Laterality: Right;     Family History   Problem Relation Age of Onset    Lung cancer Father     Colon cancer Father     Cancer Father         lung cancer    Diabetes Mother     Alzheimer's disease Mother     Lung cancer Sister     Cancer Sister         lung    Colon polyps Brother     Alcohol abuse Brother     Alzheimer's disease Brother     Cirrhosis Neg Hx      Social History     Tobacco Use    Smoking status: Never    Smokeless tobacco: Never   Substance Use Topics    Alcohol use: Yes     Alcohol/week: 26.0 standard drinks     Types: 26 Cans of beer per week     Comment: 6-8 beers per day    Drug use: No     Review of Systems   Constitutional:  Negative for fever.   HENT:  Negative for sore throat.    Eyes:  Negative for pain.   Respiratory:  Negative for shortness of breath.    Cardiovascular:  Negative for chest pain.   Gastrointestinal:  Positive for diarrhea. Negative for nausea.   Genitourinary:  Negative for dysuria.   Musculoskeletal:  Negative for back pain.   Skin:  Negative for rash.   Neurological:  Positive for syncope and weakness.   Hematological:  Does not bruise/bleed easily.     Physical Exam     Initial Vitals [11/29/22 1207]   BP Pulse Resp Temp SpO2   136/82 (!) 120 18 98.2 °F (36.8 °C) 100 %      MAP       --         Physical  Exam    Constitutional: He appears well-developed and well-nourished. He is not diaphoretic. He is easily aroused.   HENT:   Head: Normocephalic and atraumatic.   Mouth/Throat: Oropharynx is clear and moist. No oropharyngeal exudate.   Eyes: EOM and lids are normal. Pupils are equal, round, and reactive to light. No scleral icterus.   Neck: Phonation normal. Neck supple. No stridor present.   Normal range of motion.  Cardiovascular:  An irregularly irregular rhythm present.   Tachycardia present.         Pulmonary/Chest: Breath sounds normal. No stridor. No respiratory distress. He has no wheezes. He has no rales.   Abdominal: Abdomen is soft. He exhibits no distension. There is no abdominal tenderness. There is no rebound.   Musculoskeletal:         General: No tenderness or edema. Normal range of motion.      Cervical back: Normal range of motion and neck supple.     Neurological: He is alert, oriented to person, place, and time and easily aroused. He has normal strength. No sensory deficit.   Follows commands appropriately. No dysmetria, dysdiadochokinesia, peripheral vision deficits. Negative pronator drift.   Skin: Skin is warm and dry. No rash noted. No erythema.   Psychiatric: He has a normal mood and affect. His speech is normal.       ED Course   Critical Care    Date/Time: 11/29/2022 7:24 PM  Performed by: Malcolm Narayan PA-C  Authorized by: Valeriy Guevara DO   Direct patient critical care time: 15 minutes  Additional history critical care time: 6 minutes  Ordering / reviewing critical care time: 5 minutes  Documentation critical care time: 6 minutes  Consulting other physicians critical care time: 5 minutes  Total critical care time (exclusive of procedural time) : 37 minutes  Critical care was necessary to treat or prevent imminent or life-threatening deterioration of the following conditions: cardiac failure.  Critical care was time spent personally by me on the following activities: development of  treatment plan with patient or surrogate, interpretation of cardiac output measurements, evaluation of patient's response to treatment, examination of patient, obtaining history from patient or surrogate, ordering and review of laboratory studies, ordering and review of radiographic studies, re-evaluation of patient's condition and review of old charts.      Labs Reviewed   CBC W/ AUTO DIFFERENTIAL - Abnormal; Notable for the following components:       Result Value    MCH 32.6 (*)     MPV 8.5 (*)     All other components within normal limits   COMPREHENSIVE METABOLIC PANEL - Abnormal; Notable for the following components:    CO2 13 (*)     BUN 7 (*)     AST 60 (*)     Anion Gap 23 (*)     All other components within normal limits   B-TYPE NATRIURETIC PEPTIDE - Abnormal; Notable for the following components:     (*)     All other components within normal limits   URINALYSIS, REFLEX TO URINE CULTURE - Abnormal; Notable for the following components:    Color, UA Colorless (*)     All other components within normal limits    Narrative:     Specimen Source->Urine   TSH - Abnormal; Notable for the following components:    TSH 0.170 (*)     All other components within normal limits   MAGNESIUM - Abnormal; Notable for the following components:    Magnesium 1.2 (*)     All other components within normal limits   LACTIC ACID, PLASMA - Abnormal; Notable for the following components:    Lactate (Lactic Acid) 5.7 (*)     All other components within normal limits   TOXICOLOGY SCREEN, URINE, RANDOM (COMPLIANCE) - Abnormal; Notable for the following components:    Alcohol, Urine 68 (*)     All other components within normal limits    Narrative:     Specimen Source->Urine   LACTIC ACID, PLASMA - Abnormal; Notable for the following components:    Lactate (Lactic Acid) 2.5 (*)     All other components within normal limits   BETA - HYDROXYBUTYRATE, SERUM - Abnormal; Notable for the following components:    Beta-Hydroxybutyrate  0.9 (*)     All other components within normal limits   OSMOLALITY, SERUM - Abnormal; Notable for the following components:    Osmolality 316 (*)     All other components within normal limits    Narrative:     Add-on per Christen Younger MD; [facility]; [id number]  11/29/2022  17:36    ISTAT PROCEDURE - Abnormal; Notable for the following components:    POC Glucose 117 (*)     POC Sodium 134 (*)     POC TCO2 (MEASURED) 17 (*)     POC Ionized Calcium 0.94 (*)     All other components within normal limits   TROPONIN I   SARS-COV2 (COVID) WITH FLU/RSV BY PCR   T4, FREE   ALCOHOL,MEDICAL (ETHANOL)   OSMOLALITY, SERUM   BETA - HYDROXYBUTYRATE, SERUM   LACTIC ACID, PLASMA   ALCOHOL,MEDICAL (ETHANOL)   POCT GLUCOSE   POCT GLUCOSE MONITORING CONTINUOUS   ISTAT CHEM8        ECG Results              EKG 12-lead (Final result)  Result time 11/29/22 14:43:23      Final result by Interface, Lab In [facility] (11/29/22 14:43:23)                   Narrative:    Test Reason : R55,    Vent. Rate : 139 BPM     Atrial Rate : 000 BPM     P-R Int : 000 ms          QRS Dur : 088 ms      QT Int : 330 ms       P-R-T Axes : 000 -04 086 degrees     QTc Int : 502 ms    Probably AF  Low anterior forces  Abnormal ECG  When compared with ECG of 09-FEB-2022 11:15,  Nonspecific T wave abnormality now evident in Inferior leads  Nonspecific T wave abnormality now evident in Lateral leads  Confirmed by Wood SCHROEDER MD ([id number]) on 11/29/2022 2:43:08 PM    Referred By: [email or web address]   SELF           Confirmed By:Wood SCHROEDER MD                                  Imaging Results              CT Head Without Contrast (Final result)  Result time 11/29/22 15:42:47      Final result by Ayaan Garcia MD (11/29/22 15:42:47)                   Impression:      No evidence of acute intracranial pathology.    Stable area of encephalomalacia within left occipital lobe, which may represent remote infarct.    Electronically signed by resident: Jaime  Karen  Date:    11/29/2022  Time:    15:03    Electronically signed by: Ayaan Garcia  Date:    11/29/2022  Time:    15:42               Narrative:    EXAMINATION:  CT HEAD WITHOUT CONTRAST    CLINICAL HISTORY:  Head trauma, minor (Age >= 65y);    TECHNIQUE:  Low dose axial CT images obtained throughout the head without the use of intravenous contrast.  Axial, sagittal and coronal reconstructions were performed.    COMPARISON:  CT head 04/13/2021    FINDINGS:  Intracranial compartment:    Stable generalized cerebral volume loss, more prominent within the frontal lobes.  Ventricles are normal in size for age without evidence of hydrocephalus. No extra-axial blood or fluid collections.    Area of encephalomalacia within the left occipital lobe, similar to prior.  No parenchymal mass effect, hemorrhage, edema or major vascular distribution infarct.    Few scattered atherosclerotic vascular calcifications are noted at the skull base.    Skull/extracranial contents (limited evaluation):    No cranial fracture.  Mastoid air cells and paranasal sinuses are essentially clear.                                       X-Ray Chest PA And Lateral (Final result)  Result time 11/29/22 14:26:49      Final result by Ramon Golden MD (11/29/22 14:26:49)                   Impression:      Normal chest      Electronically signed by: Ramon Golden MD  Date:    11/29/2022  Time:    14:26               Narrative:    EXAMINATION:  XR CHEST PA AND LATERAL    CLINICAL HISTORY:  Syncope and collapse    TECHNIQUE:  PA and lateral views of the chest were performed.    COMPARISON:  None    FINDINGS:  Cardiac size is normal.  Lungs are clear.  No infiltrate is seen.                                       Medications   sodium chloride 0.9% flush 10 mL (has no administration in time range)   naloxone 0.4 mg/mL injection 0.02 mg (has no administration in time range)   glucose chewable tablet 16 g (has no administration in time range)    glucose chewable tablet 24 g (has no administration in time range)   glucagon (human recombinant) injection 1 mg (has no administration in time range)   dextrose 10% bolus 125 mL (has no administration in time range)   dextrose 10% bolus 250 mL (has no administration in time range)   acetaminophen tablet 650 mg (has no administration in time range)   polyethylene glycol packet 17 g (has no administration in time range)   ondansetron disintegrating tablet 8 mg (has no administration in time range)   insulin aspart U-100 pen 0-5 Units (has no administration in time range)   albuterol-ipratropium 2.5 mg-0.5 mg/3 mL nebulizer solution 3 mL (has no administration in time range)   melatonin tablet 6 mg (has no administration in time range)   aluminum-magnesium hydroxide-simethicone 200-200-20 mg/5 mL suspension 30 mL (has no administration in time range)   multivitamin tablet (has no administration in time range)   diazePAM tablet 10 mg (has no administration in time range)   thiamine tablet 100 mg (has no administration in time range)   diazePAM tablet 10 mg (has no administration in time range)   allopurinoL tablet 200 mg (has no administration in time range)   cyanocobalamin tablet 1,000 mcg (has no administration in time range)   apixaban tablet 5 mg (has no administration in time range)   hydrOXYzine HCL tablet 25 mg (has no administration in time range)   pantoprazole EC tablet 40 mg (has no administration in time range)   rOPINIRole tablet 1 mg (has no administration in time range)   atorvastatin tablet 40 mg (has no administration in time range)   tamsulosin 24 hr capsule 0.4 mg (has no administration in time range)   flecainide tablet 100 mg (has no administration in time range)   lactated ringers infusion ( Intravenous New Bag 11/29/22 1911)   metoprolol tartrate (LOPRESSOR) tablet 50 mg (has no administration in time range)   sodium chloride 0.9% bolus 1,000 mL (0 mLs Intravenous Stopped 11/29/22 3844)    flecainide tablet 100 mg (100 mg Oral Given 11/29/22 1404)   magnesium sulfate 2g in water 50mL IVPB (premix) (0 g Intravenous Stopped 11/29/22 1727)   metoprolol injection 5 mg (5 mg Intravenous Given 11/29/22 1543)   diazePAM tablet 5 mg (5 mg Oral Given 11/29/22 1615)     Medical Decision Making:   History:   Old Medical Records: I decided to obtain old medical records.  Old Records Summarized: records from previous admission(s) and records from clinic visits.  Independently Interpreted Test(s):   I have ordered and independently interpreted EKG Reading(s) - see summary below       <> Summary of EKG Reading(s): AFib  bpm. No STEMI  Clinical Tests:   Lab Tests: Ordered and Reviewed  Radiological Study: Ordered and Reviewed  Medical Tests: Ordered and Reviewed  Other:   I have discussed this case with another health care provider.       <> Summary of the Discussion: Hospital Medicine     APC / Resident Notes:   67 y.o. male with medical history of PAFib on Eliquis, CHF (EF 45-50), ETOH abuse presenting to the ED c/o syncopal episode this morning. DDx includes but not limited to orthosatic hypotension, vasovagal episode, hypoglycemia, dehydration, electrolyte disturbance, cardiac arrhythmia, ACS, pneumonia, viral syndrome. Will rule out traumatic brain injury given age and eliquis use.             Work-up reviewed. Bicarb 13 and AG elevated 23 likely related to ETOH intake and starvation ketosis. Mg replacement ordered, decreased to 1.2. CT head without acute findings. CXR without large consolidation, pleural effusion, or pneumothorax on my read. HR improved to 98 after PO Flecainide, but tachycardia returned after patient ambulated to bathroom. Metoprolol 5mg IV given with improvement. Lactate elevated 5.7 which improved to 2.5. Valium 5mg PO ordered to prevent ETOH withdrawals. CIWA checks ordered. Discussed with HM, placed in observation for further management. Patient expresses understanding and  agreeable to the plan. I have discussed the care of this patient with my supervising physician.          Clinical Impression:   Final diagnoses:  [R55] Syncope  [I48.91] Atrial fibrillation, unspecified type (Primary)  [Z78.9] Alcohol use  [E87.29] High anion gap metabolic acidosis      ED Disposition Condition    Observation                 Malcolm Narayan PA-C  11/29/22 1930

## 2022-11-29 NOTE — SUBJECTIVE & OBJECTIVE
"Past Medical History:   Diagnosis Date    A-fib     Alcohol abuse     Anxiety     Arthritis     Chronic gout     Diabetes mellitus     DM (diabetes mellitus) 11/16/2016    "boarderline, not taking any meds currently"    Fatty liver     Hyperlipidemia     Renal cell cancer 2014    S/P TKR (total knee replacement), right 6/27/2019       Past Surgical History:   Procedure Laterality Date    ABSCESS DRAINAGE      perirectal    COLONOSCOPY      HAND SURGERY Left     JOINT REPLACEMENT      knee replacement right knee    KIDNEY SURGERY      partial left kidney removal - [place]    PARTIAL NEPHRECTOMY Left August 2014    PERCUTANEOUS CRYOTHERAPY OF PERIPHERAL NERVE USING LIQUID NITROUS OXIDE IN CLOSED NEEDLE DEVICE Right 6/17/2019    Procedure: CRYOTHERAPY, NERVE, PERIPHERAL, PERCUTANEOUS, USING LIQUID NITROUS OXIDE IN CLOSED NEEDLE DEVICE-right knee iovera;  Surgeon: Donny Hair III, MD;  Location: [facility] CATH LAB;  Service: Pain Management;  Laterality: Right;    TOTAL KNEE ARTHROPLASTY Right 6/27/2019    Procedure: ARTHROPLASTY, KNEE, TOTAL-SAME DAY;  Surgeon: Mikael Huerta MD;  Location: [facility] OR [address] [place] [address];  Service: Orthopedics;  Laterality: Right;       Review of patient's allergies indicates:   Allergen Reactions    No known drug allergies        No current facility-administered medications on file prior to encounter.     Current Outpatient Medications on File Prior to Encounter   Medication Sig    allopurinoL (ZYLOPRIM) 100 MG tablet Take 2 tablets (200 mg total) by mouth once daily.    cyanocobalamin (VITAMIN B-12) 1000 MCG tablet Take 1 tablet (1,000 mcg total) by mouth once daily.    diclofenac sodium (VOLTAREN) 1 % Gel Apply 2 g topically once daily.    ELIQUIS 5 mg Tab TAKE 1 TABLET(5 MG) BY MOUTH TWICE DAILY    flecainide (TAMBOCOR) 100 MG Tab TAKE 1 TABLET(100 MG) BY MOUTH EVERY 12 HOURS AS NEEDED    folic acid (FOLVITE) 1 MG tablet Take 1 tablet (1 mg total) by mouth once daily.    hydrOXYzine HCL " (ATARAX) 25 MG tablet Take 1 tablet (25 mg total) by mouth 3 (three) times daily as needed for Anxiety.    JANUVIA 100 mg Tab TAKE 1 TABLET(100 MG) BY MOUTH EVERY DAY    LORazepam (ATIVAN) 0.5 MG tablet Take 1 tablet (0.5 mg total) by mouth nightly as needed (for panic attacks. Do not combine with alcohol).    meclizine (ANTIVERT) 25 mg tablet Take 1 tablet (25 mg total) by mouth 2 (two) times daily as needed.    metFORMIN (GLUCOPHAGE-XR) 500 MG ER 24hr tablet TAKE 2 TABLETS(1000 MG) BY MOUTH TWICE DAILY WITH MEALS    metoprolol succinate (TOPROL-XL) 25 MG 24 hr tablet Take 1 tablet (25 mg total) by mouth once daily.    multivitamin (THERAGRAN) tablet Take 1 tablet by mouth once daily.    multivitamin-minerals-lutein (MULTIVITAMIN 50 PLUS) Tab Take 1 tablet by mouth once daily.    naltrexone (DEPADE) 50 mg tablet Take 50 mg by mouth once daily.    omega-3 acid ethyl esters (LOVAZA) 1 gram capsule Take 2 g by mouth 2 (two) times daily.    ondansetron (ZOFRAN) 8 MG tablet Take 1 tablet (8 mg total) by mouth every 8 (eight) hours as needed for Nausea.    pantoprazole (PROTONIX) 40 MG tablet TAKE 1 TABLET BY MOUTH EVERY DAY    rOPINIRole (REQUIP) 1 MG tablet TAKE 1 TABLET(1 MG) BY MOUTH EVERY EVENING    rosuvastatin (CRESTOR) 20 MG tablet TAKE 1 TABLET(20 MG) BY MOUTH EVERY DAY    sertraline (ZOLOFT) 50 MG tablet     tamsulosin (FLOMAX) 0.4 mg Cap TAKE 1 CAPSULE(0.4 MG) BY MOUTH EVERY DAY    venlafaxine (EFFEXOR-XR) 75 MG 24 hr capsule Take 1 capsule (75 mg total) by mouth once daily.    VITAMIN B-1 100 MG tablet TAKE 1 TABLET BY MOUTH ONCE DAILY     Family History       Problem Relation (Age of Onset)    Alcohol abuse Brother    Alzheimer's disease Mother, Brother    Cancer Father, Sister    Colon cancer Father    Colon polyps Brother    Diabetes Mother    Lung cancer Father, Sister          Tobacco Use    Smoking status: Never    Smokeless tobacco: Never   Substance and Sexual Activity    Alcohol use: Yes      Alcohol/week: 26.0 standard drinks     Types: 26 Cans of beer per week     Comment: 6-8 beers per day    Drug use: No    Sexual activity: Not on file     Review of Systems   Constitutional:  Negative for appetite change and fever.   HENT:  Positive for dental problem and hearing loss.    Eyes:  Positive for visual disturbance.   Respiratory:  Negative for chest tightness and shortness of breath.    Cardiovascular:  Negative for chest pain and palpitations.   Gastrointestinal:  Positive for diarrhea. Negative for nausea and vomiting.   Genitourinary:  Negative for difficulty urinating.   Neurological:  Positive for syncope. Negative for headaches.   Psychiatric/Behavioral:  Negative for decreased concentration. The patient is nervous/anxious.    Objective:     Vital Signs (Most Recent):  Temp: 98.2 °F (36.8 °C) (11/29/22 1207)  Pulse: (S) (!) 125 (11/29/22 1553)  Resp: 16 (11/29/22 1539)  BP: (!) 168/105 (11/29/22 1543)  SpO2: 97 % (11/29/22 1539)   Vital Signs (24h Range):  Temp:  [98.2 °F (36.8 °C)] 98.2 °F (36.8 °C)  Pulse:  [] 125  Resp:  [16-18] 16  SpO2:  [97 %-100 %] 97 %  BP: (127-168)/() 168/105     Weight: 79.8 kg (176 lb)  Body mass index is 28.41 kg/m².    Physical Exam  Vitals reviewed.   Constitutional:       General: He is awake.      Appearance: Normal appearance. He is well-developed.   HENT:      Head: Normocephalic and atraumatic.      Nose: Nose normal.      Mouth/Throat:      Mouth: Mucous membranes are moist.      Pharynx: Oropharynx is clear.   Eyes:      General: Lids are normal. No scleral icterus.     Conjunctiva/sclera: Conjunctivae normal.      Pupils: Pupils are equal, round, and reactive to light.   Cardiovascular:      Rate and Rhythm: Tachycardia present. Rhythm irregular.      Heart sounds: No murmur heard.  Pulmonary:      Effort: Pulmonary effort is normal.      Breath sounds: Normal breath sounds.   Abdominal:      General: Bowel sounds are normal.      Palpations:  Abdomen is soft.   Musculoskeletal:         General: Normal range of motion.      Cervical back: Neck supple.      Right lower leg: No edema.      Left lower leg: No edema.   Skin:     General: Skin is warm and dry.   Neurological:      General: No focal deficit present.      Mental Status: He is alert and oriented to person, place, and time. Mental status is at baseline.   Psychiatric:         Attention and Perception: Attention normal.         Mood and Affect: Mood normal.         Behavior: Behavior is cooperative.         CRANIAL NERVES     CN III, IV, VI   Pupils are equal, round, and reactive to light.     Significant Labs: All pertinent labs within the past 24 hours have been reviewed.    Significant Imaging: I have reviewed all pertinent imaging results/findings within the past 24 hours.

## 2022-11-29 NOTE — HPI
This is a 66yo male with a h/o NIDDM, PAF, RLS, RCC who presented to [facility] ER via EMS secondary to syncopal episode that occurred at dentist office.  Per patient, he underwent placement of a dental crown.  He got up from dental chair and walked to  desk.  His vision got dark and he lost consciousness.  Fortunately, one of the staff members was able to help slowly lower him to the ground.   He was unconscious for a few seconds.  No nausea or vomiting.  Patient denies CP or SOB.  While in the ER, he was noted to be in atrial fib with RVR.  He has a prescription for propafenone and metoprolol but only takes these medications as needed for atrial fib.  He stated that he feels like he goes into atrial fib a couple of times per week.  He then takes the pills and in about 30 min, the atrial fib goes away.   He also admits to ongoing ETOH use.  His drink of choice is beer.    He had positive orthostatics in ER as well.

## 2022-11-29 NOTE — ED NOTES
I-STAT Chem-8+ Results:   Value Reference Range   Sodium 134 136-145 mmol/L   Potassium  4.3 3.5-5.1 mmol/L   Chloride 103  mmol/L   Ionized Calcium 0.94 1.06-1.42 mmol/L   CO2 (measured) 17 23-29 mmol/L   Glucose 117  mg/dL   BUN 6 6-30 mg/dL   Creatinine 0.8 0.5-1.4 mg/dL   Hematocrit 47 36-54%

## 2022-11-30 VITALS
RESPIRATION RATE: 18 BRPM | HEART RATE: 65 BPM | OXYGEN SATURATION: 98 % | TEMPERATURE: 98 F | BODY MASS INDEX: 27.8 KG/M2 | DIASTOLIC BLOOD PRESSURE: 70 MMHG | WEIGHT: 173 LBS | HEIGHT: 66 IN | SYSTOLIC BLOOD PRESSURE: 106 MMHG

## 2022-11-30 LAB
ALBUMIN SERPL BCP-MCNC: 3.7 G/DL (ref 3.5–5.2)
ALP SERPL-CCNC: 60 U/L (ref 55–135)
ALT SERPL W/O P-5'-P-CCNC: 21 U/L (ref 10–44)
ANION GAP SERPL CALC-SCNC: 15 MMOL/L (ref 8–16)
AST SERPL-CCNC: 49 U/L (ref 10–40)
AV INDEX (PROSTH): 0.68
AV MEAN GRADIENT: 3 MMHG
AV PEAK GRADIENT: 5 MMHG
AV VALVE AREA: 2.39 CM2
AV VELOCITY RATIO: 0.72
BASOPHILS # BLD AUTO: 0.07 K/UL (ref 0–0.2)
BASOPHILS NFR BLD: 0.8 % (ref 0–1.9)
BILIRUB SERPL-MCNC: 0.9 MG/DL (ref 0.1–1)
BSA FOR ECHO PROCEDURE: 1.91 M2
BUN SERPL-MCNC: 5 MG/DL (ref 8–23)
CALCIUM SERPL-MCNC: 8.6 MG/DL (ref 8.7–10.5)
CHLORIDE SERPL-SCNC: 104 MMOL/L (ref 95–110)
CO2 SERPL-SCNC: 15 MMOL/L (ref 23–29)
CREAT SERPL-MCNC: 0.8 MG/DL (ref 0.5–1.4)
CV ECHO LV RWT: 0.35 CM
DIFFERENTIAL METHOD: ABNORMAL
DOP CALC AO PEAK VEL: 1.11 M/S
DOP CALC AO VTI: 24.51 CM
DOP CALC LVOT AREA: 3.5 CM2
DOP CALC LVOT DIAMETER: 2.11 CM
DOP CALC LVOT PEAK VEL: 0.8 M/S
DOP CALC LVOT STROKE VOLUME: 58.64 CM3
DOP CALCLVOT PEAK VEL VTI: 16.78 CM
E WAVE DECELERATION TIME: 184.52 MSEC
E/A RATIO: 1.73
E/E' RATIO: 7.1 M/S
ECHO LV POSTERIOR WALL: 0.8 CM (ref 0.6–1.1)
EJECTION FRACTION: 45 %
EOSINOPHIL # BLD AUTO: 0.1 K/UL (ref 0–0.5)
EOSINOPHIL NFR BLD: 1.5 % (ref 0–8)
ERYTHROCYTE [DISTWIDTH] IN BLOOD BY AUTOMATED COUNT: 13.6 % (ref 11.5–14.5)
EST. GFR  (NO RACE VARIABLE): >60 ML/MIN/1.73 M^2
FRACTIONAL SHORTENING: 28 % (ref 28–44)
GLUCOSE SERPL-MCNC: 97 MG/DL (ref 70–110)
HCT VFR BLD AUTO: 41.6 % (ref 40–54)
HGB BLD-MCNC: 14.4 G/DL (ref 14–18)
IMM GRANULOCYTES # BLD AUTO: 0.03 K/UL (ref 0–0.04)
IMM GRANULOCYTES NFR BLD AUTO: 0.3 % (ref 0–0.5)
INTERVENTRICULAR SEPTUM: 0.79 CM (ref 0.6–1.1)
LA MAJOR: 5.27 CM
LA MINOR: 5.19 CM
LA WIDTH: 4.5 CM
LEFT ATRIUM SIZE: 3.24 CM
LEFT ATRIUM VOLUME INDEX MOD: 33.8 ML/M2
LEFT ATRIUM VOLUME INDEX: 34.5 ML/M2
LEFT ATRIUM VOLUME MOD: 63.59 CM3
LEFT ATRIUM VOLUME: 64.81 CM3
LEFT INTERNAL DIMENSION IN SYSTOLE: 3.25 CM (ref 2.1–4)
LEFT VENTRICLE DIASTOLIC VOLUME INDEX: 49.4 ML/M2
LEFT VENTRICLE DIASTOLIC VOLUME: 92.88 ML
LEFT VENTRICLE MASS INDEX: 60 G/M2
LEFT VENTRICLE SYSTOLIC VOLUME INDEX: 22.6 ML/M2
LEFT VENTRICLE SYSTOLIC VOLUME: 42.53 ML
LEFT VENTRICULAR INTERNAL DIMENSION IN DIASTOLE: 4.51 CM (ref 3.5–6)
LEFT VENTRICULAR MASS: 113.13 G
LV LATERAL E/E' RATIO: 6.45 M/S
LV SEPTAL E/E' RATIO: 7.89 M/S
LYMPHOCYTES # BLD AUTO: 2 K/UL (ref 1–4.8)
LYMPHOCYTES NFR BLD: 22.7 % (ref 18–48)
MAGNESIUM SERPL-MCNC: 1.5 MG/DL (ref 1.6–2.6)
MCH RBC QN AUTO: 32.7 PG (ref 27–31)
MCHC RBC AUTO-ENTMCNC: 34.6 G/DL (ref 32–36)
MCV RBC AUTO: 95 FL (ref 82–98)
MONOCYTES # BLD AUTO: 0.9 K/UL (ref 0.3–1)
MONOCYTES NFR BLD: 10.7 % (ref 4–15)
MV A" WAVE DURATION": 11.8 MSEC
MV PEAK A VEL: 0.41 M/S
MV PEAK E VEL: 0.71 M/S
MV STENOSIS PRESSURE HALF TIME: 53.51 MS
MV VALVE AREA P 1/2 METHOD: 4.11 CM2
NEUTROPHILS # BLD AUTO: 5.5 K/UL (ref 1.8–7.7)
NEUTROPHILS NFR BLD: 64 % (ref 38–73)
NRBC BLD-RTO: 0 /100 WBC
PISA TR MAX VEL: 2.75 M/S
PLATELET # BLD AUTO: 157 K/UL (ref 150–450)
PMV BLD AUTO: 8.6 FL (ref 9.2–12.9)
POTASSIUM SERPL-SCNC: 3.8 MMOL/L (ref 3.5–5.1)
PROT SERPL-MCNC: 6.8 G/DL (ref 6–8.4)
PULM VEIN S/D RATIO: 0.89
PV PEAK D VEL: 0.62 M/S
PV PEAK S VEL: 0.55 M/S
RA MAJOR: 5.38 CM
RA PRESSURE: 3 MMHG
RA WIDTH: 4.41 CM
RBC # BLD AUTO: 4.4 M/UL (ref 4.6–6.2)
RIGHT VENTRICULAR END-DIASTOLIC DIMENSION: 4.07 CM
SINUS: 3.5 CM
SODIUM SERPL-SCNC: 134 MMOL/L (ref 136–145)
STJ: 3.28 CM
TDI LATERAL: 0.11 M/S
TDI SEPTAL: 0.09 M/S
TDI: 0.1 M/S
TR MAX PG: 30 MMHG
TRICUSPID ANNULAR PLANE SYSTOLIC EXCURSION: 1.66 CM
TV REST PULMONARY ARTERY PRESSURE: 33 MMHG
WBC # BLD AUTO: 8.59 K/UL (ref 3.9–12.7)

## 2022-11-30 PROCEDURE — 36415 COLL VENOUS BLD VENIPUNCTURE: CPT | Performed by: HOSPITALIST

## 2022-11-30 PROCEDURE — 25000003 PHARM REV CODE 250: Performed by: HOSPITALIST

## 2022-11-30 PROCEDURE — 83735 ASSAY OF MAGNESIUM: CPT | Performed by: HOSPITALIST

## 2022-11-30 PROCEDURE — G0378 HOSPITAL OBSERVATION PER HR: HCPCS

## 2022-11-30 PROCEDURE — 80053 COMPREHEN METABOLIC PANEL: CPT | Performed by: HOSPITALIST

## 2022-11-30 PROCEDURE — 96361 HYDRATE IV INFUSION ADD-ON: CPT

## 2022-11-30 PROCEDURE — 99217 PR OBSERVATION CARE DISCHARGE: ICD-10-PCS | Mod: ,,, | Performed by: HOSPITALIST

## 2022-11-30 PROCEDURE — 99217 PR OBSERVATION CARE DISCHARGE: CPT | Mod: ,,, | Performed by: HOSPITALIST

## 2022-11-30 PROCEDURE — 85025 COMPLETE CBC W/AUTO DIFF WBC: CPT | Performed by: HOSPITALIST

## 2022-11-30 PROCEDURE — 63600175 PHARM REV CODE 636 W HCPCS: Performed by: HOSPITALIST

## 2022-11-30 RX ORDER — SODIUM BICARBONATE 650 MG/1
1300 TABLET ORAL 2 TIMES DAILY
Status: DISCONTINUED | OUTPATIENT
Start: 2022-11-30 | End: 2022-11-30 | Stop reason: HOSPADM

## 2022-11-30 RX ADMIN — CYANOCOBALAMIN TAB 1000 MCG 1000 MCG: 1000 TAB at 08:11

## 2022-11-30 RX ADMIN — Medication 100 MG: at 08:11

## 2022-11-30 RX ADMIN — DIAZEPAM 10 MG: 5 TABLET ORAL at 01:11

## 2022-11-30 RX ADMIN — THERA TABS 1 TABLET: TAB at 08:11

## 2022-11-30 RX ADMIN — METOPROLOL TARTRATE 50 MG: 50 TABLET, FILM COATED ORAL at 08:11

## 2022-11-30 RX ADMIN — SODIUM CHLORIDE, SODIUM LACTATE, POTASSIUM CHLORIDE, AND CALCIUM CHLORIDE: .6; .31; .03; .02 INJECTION, SOLUTION INTRAVENOUS at 06:11

## 2022-11-30 RX ADMIN — FLECAINIDE ACETATE 100 MG: 50 TABLET ORAL at 08:11

## 2022-11-30 RX ADMIN — PANTOPRAZOLE SODIUM 40 MG: 40 TABLET, DELAYED RELEASE ORAL at 08:11

## 2022-11-30 RX ADMIN — ALLOPURINOL 200 MG: 100 TABLET ORAL at 08:11

## 2022-11-30 RX ADMIN — DIAZEPAM 10 MG: 5 TABLET ORAL at 06:11

## 2022-11-30 RX ADMIN — ATORVASTATIN CALCIUM 40 MG: 40 TABLET, FILM COATED ORAL at 08:11

## 2022-11-30 RX ADMIN — POLYETHYLENE GLYCOL 3350 17 G: 17 POWDER, FOR SOLUTION ORAL at 08:11

## 2022-11-30 RX ADMIN — APIXABAN 5 MG: 5 TABLET, FILM COATED ORAL at 08:11

## 2022-11-30 RX ADMIN — TAMSULOSIN HYDROCHLORIDE 0.4 MG: 0.4 CAPSULE ORAL at 08:11

## 2022-11-30 RX ADMIN — SODIUM BICARBONATE 650 MG TABLET 1300 MG: at 08:11

## 2022-11-30 NOTE — PLAN OF CARE
[facility] - Observation 11H  Discharge Assessment    Primary Care Provider: Bacilio Larry MD     Discharge Assessment (most recent)       BRIEF DISCHARGE ASSESSMENT - 11/30/22 1106          Discharge Planning    Assessment Type Discharge Planning Brief Assessment     Resource/Environmental Concerns none     Support Systems Children     Equipment Currently Used at Home walker, rolling     Current Living Arrangements home/apartment/condo     Patient/Family Anticipates Transition to home     Patient/Family Anticipated Services at Transition none     DME Needed Upon Discharge  none     Discharge Plan A Home     Discharge Plan B Home        Physical Activity    On average, how many days per week do you engage in moderate to strenuous exercise (like a brisk walk)? 7 days     On average, how many minutes do you engage in exercise at this level? 20 min        Financial Resource Strain    How hard is it for you to pay for the very basics like food, housing, medical care, and heating? Not hard at all        Housing Stability    In the last 12 months, was there a time when you were not able to pay the mortgage or rent on time? No     In the last 12 months, how many places have you lived? 1     In the last 12 months, was there a time when you did not have a steady place to sleep or slept in a shelter (including now)? No        Transportation Needs    In the past 12 months, has lack of transportation kept you from medical appointments or from getting medications? No     In the past 12 months, has lack of transportation kept you from meetings, work, or from getting things needed for daily living? No        Food Insecurity    Within the past 12 months, you worried that your food would run out before you got the money to buy more. Never true     Within the past 12 months, the food you bought just didn't last and you didn't have money to get more. Never true        Stress    Do you feel stress - tense, restless, nervous, or anxious,  or unable to sleep at night because your mind is troubled all the time - these days? Not at all        Social Connections    In a typical week, how many times do you talk on the phone with family, friends, or neighbors? More than three times a week     How often do you get together with friends or relatives? More than three times a week     How often do you attend Mormon or Zoroastrianism services? Never     Do you belong to any clubs or organizations such as Mormon groups, unions, fraternal or athletic groups, or school groups? No     How often do you attend meetings of the clubs or organizations you belong to? Never     Are you , , , , never , or living with a partner?         Alcohol Use    Q1: How often do you have a drink containing alcohol? Never     Q2: How many drinks containing alcohol do you have on a typical day when you are drinking? Patient does not drink     Q3: How often do you have six or more drinks on one occasion? Never                   Pt is a 67 y.o. male admitted with syncope and collapse. He has a PMH of NIDDM and PAF. He lives by himself in a single story house with no steps. He is independent with his ADLs and IADLs. [facility] Discharge Packet given to patient and/or family with understanding verbalized.   name and number and estimated discharge date written on white board in patient's room with request to call for any questions or concerns.  Will continue to follow for needs.  Mode Field RN,BSN

## 2022-11-30 NOTE — PLAN OF CARE
[facility] 11[facility]  Discharge Final Note    Primary Care Provider: Bacilio Larry MD    Expected Discharge Date: 11/30/2022    Future Appointments   Date Time Provider Department Center   12/28/2022  9:00 [facility]   2/13/2023 10:30 AM Tomas Sanz MD [facility]       Pt discharged home with no services.    Mode Field, RN,BSN        Final Discharge Note (most recent)       Final Note - 11/30/22 1510          Final Note    Assessment Type Final Discharge Note     Anticipated Discharge Disposition Home or Self Care     Hospital Resources/Appts/Education Provided Provided patient/caregiver with written discharge plan information;Appointments scheduled and added to AVS        Post-Acute Status    Discharge Delays None known at this time                     Important Message from Medicare

## 2022-11-30 NOTE — ED TRIAGE NOTES
67 y.o. male with medical history of PAFib on Eliquis, CHF (EF 45-50), ETOH abuse presenting to the ED with the chief complaint of syncopal episode.   Patient experienced a syncopal episode at the dentist office this morning. Reports having a cap placed for a R back molar and had local anesthesia for the procedure. Reports feeling weak when he was standing at the desk to pay his bill.  noticed he appeared pale and lowered him to the ground. Denies falling to the ground or hitting his head. Dentist gave him a brownie due to concern of his blood sugar. Received 100cc IVF with EMS during transit. Reports feeling generalized weakness at the time of my exam. Reports daily ETOH use in the form of beer. Estimates to drink 10-15 beers a day. Last beer was this morning. Reports having 1 episode of non-bloody diarrhea today. He did not eat anything today. He did not take Metoprolol today. Denies headache, neck pain, chest pain, SOB, abdominal pain, back pain, numbness, paresthesias, extremity weakness.

## 2022-11-30 NOTE — DISCHARGE SUMMARY
[facility] Observation [address] [place] [address] Medicine  Discharge Summary      Patient Name: Donald Warren Abadie  MRN: [id number]  CARLA: [id number]  Patient Class: OP- Observation  Admission Date: 11/29/2022  Hospital Length of Stay: 0 days  Discharge Date and Time:  11/30/2022 4:32 PM  Attending Physician: No att. providers found   Discharging Provider: Christen Younger MD  Primary Care Provider: Bacilio Larry MD  Hospital Medicine Team: [facility] HOSP MED  Christen Younger MD  Primary Care Team: [facility]    HPI:   This is a 66yo male with a h/o NIDDM, PAF, RLS, RCC who presented to [facility] ER via EMS secondary to syncopal episode that occurred at dentist office.  Per patient, he underwent placement of a dental crown.  He got up from dental chair and walked to  desk.  His vision got dark and he lost consciousness.  Fortunately, one of the staff members was able to help slowly lower him to the ground.   He was unconscious for a few seconds.  No nausea or vomiting.  Patient denies CP or SOB.  While in the ER, he was noted to be in atrial fib with RVR.  He has a prescription for propafenone and metoprolol but only takes these medications as needed for atrial fib.  He stated that he feels like he goes into atrial fib a couple of times per week.  He then takes the pills and in about 30 min, the atrial fib goes away.   He also admits to ongoing ETOH use.  His drink of choice is beer.    He had positive orthostatics in ER as well.          * No surgery found *      Hospital Course:   Patient admitted to hospital medicine service for atrial fib with RVR, syncope, dehydration, electrolyte abnormalities.  He was restarted on home dose of flecainide and metoprolol resulting in spontaneous conversion to NSR.  He was also treated with IV fluids overnight.  Patient was monitored on telemetry during hospital admission.  Echocardiogram demonstrated no valvular or structural abnormalities. Syncope most consistent with orthostatic  hypotension.   The patient was encouraged to drink plenty of water.  He was advised that alcohol actually dehydrates you.  During admission, the patient was placed on CIWA protocol.  No acute events.  Patient remained stable.         Goals of Care Treatment Preferences:  Code Status: Full Code      Consults:     * Syncope and collapse  - history consistent with orthostatic hypotension   - +orthostatics in ER  - continue IV fluids  - check  Echocardiogram to rule out structural heart disease as possible cause  - tele    Atrial fibrillation with RVR  Patient with Paroxysmal (<7 days) atrial fibrillation which is uncontrolled currently with Beta Blocker and flecanide. Patient is currently in atrial fibrillation.[id number] Score: 2. HASBLED Score: 0. Anticoagulation indicated. Anticoagulation done with apixaban.    Restart flecainide and metoprolol          Hypomagnesemia  - most likely secondary to ETOH abuse  - replete       Metabolic acidosis, increased anion gap  - elevated lactic acid  - check serum ketones   - IV fluids  - monitor   - resolved      Type 2 diabetes mellitus without complication, without long-term current use of insulin  Patient's FSGs are controlled on current medication regimen.  Last A1c reviewed-   Lab Results   Component Value Date    HGBA1C 5.1 11/11/2022     Most recent fingerstick glucose reviewed-   No results for input(s): POCTGLUCOSE in the last 24 hours.  Current correctional scale  Low  Maintain anti-hyperglycemic dose as follows-   Antihyperglycemics (From admission, onward)    Start     Stop Route Frequency Ordered    11/29/22 1756  insulin aspart U-100 pen 0-5 Units         -- SubQ Before meals & nightly PRN 11/29/22 1657        Hold Oral hypoglycemics while patient is in the hospital.    Alcohol use disorder, moderate, dependence  - patient continues to drink daily  - check ETOH  Level  - [facility] protocol  - thiamine, FA, B12, MV  - IV fluids        Final Active Diagnoses:    Diagnosis  Date Noted POA    PRINCIPAL PROBLEM:  Syncope and collapse [R55] 11/29/2022 Yes    Atrial fibrillation with RVR [I48.91] 05/22/2014 Yes    Metabolic acidosis, increased anion gap [E87.29] 11/29/2022 Yes    Hypomagnesemia [E83.42] 11/29/2022 Yes    Type 2 diabetes mellitus without complication, without long-term current use of insulin [E11.9] 10/07/2019 Yes    Alcohol use disorder, moderate, dependence [F10.20]  Yes      Problems Resolved During this Admission:       Discharged Condition: stable    Disposition: Home or Self Care    Follow Up:    Patient Instructions:   No discharge procedures on file.    Significant Diagnostic Studies: Labs: All labs within the past 24 hours have been reviewed    Pending Diagnostic Studies:     None         Medications:  Reconciled Home Medications:      Medication List      ASK your doctor about these medications    allopurinoL 100 MG tablet  Commonly known as: ZYLOPRIM  Take 2 tablets (200 mg total) by mouth once daily.     cyanocobalamin 1000 MCG tablet  Commonly known as: VITAMIN B-12  Take 1 tablet (1,000 mcg total) by mouth once daily.     diclofenac sodium 1 % Gel  Commonly known as: VOLTAREN  Apply 2 g topically once daily.     ELIQUIS 5 mg Tab  Generic drug: apixaban  TAKE 1 TABLET(5 MG) BY MOUTH TWICE DAILY     flecainide 100 MG Tab  Commonly known as: TAMBOCOR  TAKE 1 TABLET(100 MG) BY MOUTH EVERY 12 HOURS AS NEEDED     folic acid 1 MG tablet  Commonly known as: FOLVITE  Take 1 tablet (1 mg total) by mouth once daily.     hydrOXYzine HCL 25 MG tablet  Commonly known as: ATARAX  Take 1 tablet (25 mg total) by mouth 3 (three) times daily as needed for Anxiety.     JANUVIA 100 MG Tab  Generic drug: SITagliptin phosphate  TAKE 1 TABLET(100 MG) BY MOUTH EVERY DAY     LORazepam 0.5 MG tablet  Commonly known as: ATIVAN  Take 1 tablet (0.5 mg total) by mouth nightly as needed (for panic attacks. Do not combine with alcohol).     meclizine 25 mg tablet  Commonly known as:  ANTIVERT  Take 1 tablet (25 mg total) by mouth 2 (two) times daily as needed.     metFORMIN 500 MG ER 24hr tablet  Commonly known as: GLUCOPHAGE-XR  TAKE 2 TABLETS(1000 MG) BY MOUTH TWICE DAILY WITH MEALS     metoprolol succinate 25 MG 24 hr tablet  Commonly known as: TOPROL-XL  Take 1 tablet (25 mg total) by mouth once daily.     MULTIVITAMIN 50 PLUS Tab  Generic drug: multivitamin-minerals-lutein  Take 1 tablet by mouth once daily.     multivitamin tablet  Commonly known as: THERAGRAN  Take 1 tablet by mouth once daily.     naltrexone 50 mg tablet  Commonly known as: DEPADE  Take 50 mg by mouth once daily.     omega-3 acid ethyl esters 1 gram capsule  Commonly known as: LOVAZA  Take 2 g by mouth 2 (two) times daily.     ondansetron 8 MG tablet  Commonly known as: ZOFRAN  Take 1 tablet (8 mg total) by mouth every 8 (eight) hours as needed for Nausea.     pantoprazole 40 MG tablet  Commonly known as: PROTONIX  TAKE 1 TABLET BY MOUTH EVERY DAY     rOPINIRole 1 MG tablet  Commonly known as: REQUIP  TAKE 1 TABLET(1 MG) BY MOUTH EVERY EVENING     rosuvastatin 20 MG tablet  Commonly known as: CRESTOR  TAKE 1 TABLET(20 MG) BY MOUTH EVERY DAY     sertraline 50 MG tablet  Commonly known as: ZOLOFT     tamsulosin 0.4 mg Cap  Commonly known as: FLOMAX  TAKE 1 CAPSULE(0.4 MG) BY MOUTH EVERY DAY     venlafaxine 75 MG 24 hr capsule  Commonly known as: EFFEXOR-XR  Take 1 capsule (75 mg total) by mouth once daily.     VITAMIN B-1 100 MG tablet  Generic drug: thiamine  TAKE 1 TABLET BY MOUTH ONCE DAILY            Indwelling Lines/Drains at time of discharge:   Lines/Drains/Airways     Epidural Line  Duration                Perineural Analgesia/Anesthesia Assessment (using dermatomes) 06/27/19 1031 1252 days                Time spent on the discharge of patient: 25 minutes         Christen Younger MD  Department of Hospital Medicine  [facility] - Observation 11H

## 2022-11-30 NOTE — ASSESSMENT & PLAN NOTE
Patient with Paroxysmal (<7 days) atrial fibrillation which is uncontrolled currently with Beta Blocker and flecanide. Patient is currently in atrial fibrillation.[id number] Score: 2. HASBLED Score: 0. Anticoagulation indicated. Anticoagulation done with apixaban.    Restart flecainide and metoprolol  If he does not convert to NSR, consider EP consult

## 2022-11-30 NOTE — ASSESSMENT & PLAN NOTE
Patient's FSGs are controlled on current medication regimen.  Last A1c reviewed-   Lab Results   Component Value Date    HGBA1C 5.1 11/11/2022     Most recent fingerstick glucose reviewed-   No results for input(s): POCTGLUCOSE in the last 24 hours.  Current correctional scale  Low  Maintain anti-hyperglycemic dose as follows-   Antihyperglycemics (From admission, onward)    Start     Stop Route Frequency Ordered    11/29/22 1756  insulin aspart U-100 pen 0-5 Units         -- SubQ Before meals & nightly PRN 11/29/22 1657        Hold Oral hypoglycemics while patient is in the hospital.

## 2022-11-30 NOTE — ASSESSMENT & PLAN NOTE
- patient continues to drink daily  - check ETOH  Level  - [facility] protocol  - thiamine, FA, B12, MV  - IV fluids

## 2022-11-30 NOTE — ASSESSMENT & PLAN NOTE
- patient continues to drink daily  - check ETOH  Level  - [facility] protocol  - thiamine, FA, B12, MV  - IV fluids

## 2022-11-30 NOTE — ASSESSMENT & PLAN NOTE
Patient's FSGs are controlled on current medication regimen.  Last A1c reviewed-   Lab Results   Component Value Date    HGBA1C 5.1 11/11/2022     Most recent fingerstick glucose reviewed-   Recent Labs   Lab 11/29/22  1329   POCTGLUCOSE 108     Current correctional scale  Low  Maintain anti-hyperglycemic dose as follows-   Antihyperglycemics (From admission, onward)    Start     Stop Route Frequency Ordered    11/29/22 1756  insulin aspart U-100 pen 0-5 Units         -- SubQ Before meals & nightly PRN 11/29/22 1657        Hold Oral hypoglycemics while patient is in the hospital.

## 2022-11-30 NOTE — NURSING
Patient arrived to the unit daughter with him. Telemetry and IV intact and fluids running. Orientation to the unit given call bell within reach and patient instructed to call for help.

## 2022-11-30 NOTE — ASSESSMENT & PLAN NOTE
Patient with Paroxysmal (<7 days) atrial fibrillation which is uncontrolled currently with Beta Blocker and flecanide. Patient is currently in atrial fibrillation.[id number] Score: 2. HASBLED Score: 0. Anticoagulation indicated. Anticoagulation done with apixaban.    Restart flecainide and metoprolol

## 2022-11-30 NOTE — H&P
"[facility] - Emergency Dept  [facility] Medicine  History & Physical    Patient Name: Donald Warren Abadie  MRN: [id number]  Patient Class: OP- Observation  Admission Date: 11/29/2022  Attending Physician: Christen Younger MD   Primary Care Provider: Bacilio Larry MD         Patient information was obtained from patient and ER records.     Subjective:     Principal Problem:Syncope and collapse    Chief Complaint:   Chief Complaint   Patient presents with    Loss of Consciousness     Was paying his bill at the dental office and had syncope episode. Pt currently awake and alert, AAOx4        HPI: This is a 68yo male with a h/o NIDDM, PAF, RLS, RCC who presented to [facility] via EMS secondary to syncopal episode that occurred at dentist office.  Per patient, he underwent placement of a dental crown.  He got up from dental chair and walked to  desk.  His vision got dark and he lost consciousness.  Fortunately, one of the staff members was able to help slowly lower him to the ground.   He was unconscious for a few seconds.  No nausea or vomiting.  Patient denies CP or SOB.  While in the ER, he was noted to be in atrial fib with RVR.  He has a prescription for propafenone and metoprolol but only takes these medications as needed for atrial fib.  He stated that he feels like he goes into atrial fib a couple of times per week.  He then takes the pills and in about 30 min, the atrial fib goes away.   He also admits to ongoing ETOH use.  His drink of choice is beer.    He had positive orthostatics in ER as well.          Past Medical History:   Diagnosis Date    A-fib     Alcohol abuse     Anxiety     Arthritis     Chronic gout     Diabetes mellitus     DM (diabetes mellitus) 11/16/2016    "boarderline, not taking any meds currently"    Fatty liver     Hyperlipidemia     Renal cell cancer 2014    S/P TKR (total knee replacement), right 6/27/2019       Past Surgical History:   Procedure Laterality Date    " ABSCESS DRAINAGE      perirectal    COLONOSCOPY      HAND SURGERY Left     JOINT REPLACEMENT      knee replacement right knee    KIDNEY SURGERY      partial left kidney removal - [place]    PARTIAL NEPHRECTOMY Left August 2014    PERCUTANEOUS CRYOTHERAPY OF PERIPHERAL NERVE USING LIQUID NITROUS OXIDE IN CLOSED NEEDLE DEVICE Right 6/17/2019    Procedure: CRYOTHERAPY, NERVE, PERIPHERAL, PERCUTANEOUS, USING LIQUID NITROUS OXIDE IN CLOSED NEEDLE DEVICE-right knee iovera;  Surgeon: Donny Hair III, MD;  Location: [facility] CATH LAB;  Service: Pain Management;  Laterality: Right;    TOTAL KNEE ARTHROPLASTY Right 6/27/2019    Procedure: ARTHROPLASTY, KNEE, TOTAL-SAME DAY;  Surgeon: Mikael Huerta MD;  Location: [facility] OR [address] [place] [address];  Service: Orthopedics;  Laterality: Right;       Review of patient's allergies indicates:   Allergen Reactions    No known drug allergies        No current facility-administered medications on file prior to encounter.     Current Outpatient Medications on File Prior to Encounter   Medication Sig    allopurinoL (ZYLOPRIM) 100 MG tablet Take 2 tablets (200 mg total) by mouth once daily.    cyanocobalamin (VITAMIN B-12) 1000 MCG tablet Take 1 tablet (1,000 mcg total) by mouth once daily.    diclofenac sodium (VOLTAREN) 1 % Gel Apply 2 g topically once daily.    ELIQUIS 5 mg Tab TAKE 1 TABLET(5 MG) BY MOUTH TWICE DAILY    flecainide (TAMBOCOR) 100 MG Tab TAKE 1 TABLET(100 MG) BY MOUTH EVERY 12 HOURS AS NEEDED    folic acid (FOLVITE) 1 MG tablet Take 1 tablet (1 mg total) by mouth once daily.    hydrOXYzine HCL (ATARAX) 25 MG tablet Take 1 tablet (25 mg total) by mouth 3 (three) times daily as needed for Anxiety.    JANUVIA 100 mg Tab TAKE 1 TABLET(100 MG) BY MOUTH EVERY DAY    LORazepam (ATIVAN) 0.5 MG tablet Take 1 tablet (0.5 mg total) by mouth nightly as needed (for panic attacks. Do not combine with alcohol).    meclizine (ANTIVERT) 25 mg tablet Take 1 tablet (25 mg total) by  mouth 2 (two) times daily as needed.    metFORMIN (GLUCOPHAGE-XR) 500 MG ER 24hr tablet TAKE 2 TABLETS(1000 MG) BY MOUTH TWICE DAILY WITH MEALS    metoprolol succinate (TOPROL-XL) 25 MG 24 hr tablet Take 1 tablet (25 mg total) by mouth once daily.    multivitamin (THERAGRAN) tablet Take 1 tablet by mouth once daily.    multivitamin-minerals-lutein (MULTIVITAMIN 50 PLUS) Tab Take 1 tablet by mouth once daily.    naltrexone (DEPADE) 50 mg tablet Take 50 mg by mouth once daily.    omega-3 acid ethyl esters (LOVAZA) 1 gram capsule Take 2 g by mouth 2 (two) times daily.    ondansetron (ZOFRAN) 8 MG tablet Take 1 tablet (8 mg total) by mouth every 8 (eight) hours as needed for Nausea.    pantoprazole (PROTONIX) 40 MG tablet TAKE 1 TABLET BY MOUTH EVERY DAY    rOPINIRole (REQUIP) 1 MG tablet TAKE 1 TABLET(1 MG) BY MOUTH EVERY EVENING    rosuvastatin (CRESTOR) 20 MG tablet TAKE 1 TABLET(20 MG) BY MOUTH EVERY DAY    sertraline (ZOLOFT) 50 MG tablet     tamsulosin (FLOMAX) 0.4 mg Cap TAKE 1 CAPSULE(0.4 MG) BY MOUTH EVERY DAY    venlafaxine (EFFEXOR-XR) 75 MG 24 hr capsule Take 1 capsule (75 mg total) by mouth once daily.    VITAMIN B-1 100 MG tablet TAKE 1 TABLET BY MOUTH ONCE DAILY     Family History       Problem Relation (Age of Onset)    Alcohol abuse Brother    Alzheimer's disease Mother, Brother    Cancer Father, Sister    Colon cancer Father    Colon polyps Brother    Diabetes Mother    Lung cancer Father, Sister          Tobacco Use    Smoking status: Never    Smokeless tobacco: Never   Substance and Sexual Activity    Alcohol use: Yes     Alcohol/week: 26.0 standard drinks     Types: 26 Cans of beer per week     Comment: 6-8 beers per day    Drug use: No    Sexual activity: Not on file     Review of Systems   Constitutional:  Negative for appetite change and fever.   HENT:  Positive for dental problem and hearing loss.    Eyes:  Positive for visual disturbance.   Respiratory:  Negative for chest  tightness and shortness of breath.    Cardiovascular:  Negative for chest pain and palpitations.   Gastrointestinal:  Positive for diarrhea. Negative for nausea and vomiting.   Genitourinary:  Negative for difficulty urinating.   Neurological:  Positive for syncope. Negative for headaches.   Psychiatric/Behavioral:  Negative for decreased concentration. The patient is nervous/anxious.    Objective:     Vital Signs (Most Recent):  Temp: 98.2 °F (36.8 °C) (11/29/22 1207)  Pulse: (S) (!) 125 (11/29/22 1553)  Resp: 16 (11/29/22 1539)  BP: (!) 168/105 (11/29/22 1543)  SpO2: 97 % (11/29/22 1539)   Vital Signs (24h Range):  Temp:  [98.2 °F (36.8 °C)] 98.2 °F (36.8 °C)  Pulse:  [] 125  Resp:  [16-18] 16  SpO2:  [97 %-100 %] 97 %  BP: (127-168)/() 168/105     Weight: 79.8 kg (176 lb)  Body mass index is 28.41 kg/m².    Physical Exam  Vitals reviewed.   Constitutional:       General: He is awake.      Appearance: Normal appearance. He is well-developed.   HENT:      Head: Normocephalic and atraumatic.      Nose: Nose normal.      Mouth/Throat:      Mouth: Mucous membranes are moist.      Pharynx: Oropharynx is clear.   Eyes:      General: Lids are normal. No scleral icterus.     Conjunctiva/sclera: Conjunctivae normal.      Pupils: Pupils are equal, round, and reactive to light.   Cardiovascular:      Rate and Rhythm: Tachycardia present. Rhythm irregular.      Heart sounds: No murmur heard.  Pulmonary:      Effort: Pulmonary effort is normal.      Breath sounds: Normal breath sounds.   Abdominal:      General: Bowel sounds are normal.      Palpations: Abdomen is soft.   Musculoskeletal:         General: Normal range of motion.      Cervical back: Neck supple.      Right lower leg: No edema.      Left lower leg: No edema.   Skin:     General: Skin is warm and dry.   Neurological:      General: No focal deficit present.      Mental Status: He is alert and oriented to person, place, and time. Mental status is at  baseline.   Psychiatric:         Attention and Perception: Attention normal.         Mood and Affect: Mood normal.         Behavior: Behavior is cooperative.         CRANIAL NERVES     CN III, IV, VI   Pupils are equal, round, and reactive to light.     Significant Labs: All pertinent labs within the past 24 hours have been reviewed.    Significant Imaging: I have reviewed all pertinent imaging results/findings within the past 24 hours.    Assessment/Plan:     * Syncope and collapse  - history consistent with orthostatic hypotension   - +orthostatics in ER  - continue IV fluids  - check  Echocardiogram to rule out structural heart disease as possible cause  - tele    Atrial fibrillation with RVR  Patient with Paroxysmal (<7 days) atrial fibrillation which is uncontrolled currently with Beta Blocker and flecanide. Patient is currently in atrial fibrillation.[id number] Score: 2. HASBLED Score: 0. Anticoagulation indicated. Anticoagulation done with apixaban.    Restart flecainide and metoprolol  If he does not convert to NSR, consider EP consult        Hypomagnesemia  - most likely secondary to ETOH abuse  - replete and start on daily supplements       Metabolic acidosis, increased anion gap  - elevated lactic acid  - check serum ketones   - IV fluids  - monitor       Type 2 diabetes mellitus without complication, without long-term current use of insulin  Patient's FSGs are controlled on current medication regimen.  Last A1c reviewed-   Lab Results   Component Value Date    HGBA1C 5.1 11/11/2022     Most recent fingerstick glucose reviewed-   Recent Labs   Lab 11/29/22  1329   POCTGLUCOSE 108     Current correctional scale  Low  Maintain anti-hyperglycemic dose as follows-   Antihyperglycemics (From admission, onward)    Start     Stop Route Frequency Ordered    11/29/22 1756  insulin aspart U-100 pen 0-5 Units         -- SubQ Before meals & nightly PRN 11/29/22 1657        Hold Oral hypoglycemics while patient is in  the hospital.    Alcohol use disorder, moderate, dependence  - patient continues to drink daily  - check ETOH  Level  - [facility] protocol  - thiamine, FA, B12, MV  - IV fluids        VTE Risk Mitigation (From admission, onward)         Ordered     apixaban tablet 5 mg  2 times daily         11/29/22 1707     IP VTE HIGH RISK PATIENT  Once         11/29/22 1657     Place sequential compression device  Until discontinued         11/29/22 1657                   Christen Younger MD  Department of Hospital Medicine   [facility] - Emergency Dept

## 2022-11-30 NOTE — HOSPITAL COURSE
Patient admitted to hospital medicine service for atrial fib with RVR, syncope, dehydration, electrolyte abnormalities.  He was restarted on home dose of flecainide and metoprolol resulting in spontaneous conversion to NSR.  He was also treated with IV fluids overnight.  Patient was monitored on telemetry during hospital admission.  Echocardiogram demonstrated no valvular or structural abnormalities. Syncope most consistent with orthostatic hypotension.   The patient was encouraged to drink plenty of water.  He was advised that alcohol actually dehydrates you.  During admission, the patient was placed on CIWA protocol.  No acute events.  Patient remained stable.

## 2022-11-30 NOTE — ASSESSMENT & PLAN NOTE
- history consistent with orthostatic hypotension   - +orthostatics in ER  - continue IV fluids  - check  Echocardiogram to rule out structural heart disease as possible cause  - tele

## 2022-12-01 ENCOUNTER — PATIENT MESSAGE (OUTPATIENT)
Dept: INTERNAL MEDICINE | Facility: CLINIC | Age: 67
End: 2022-12-01
Payer: MEDICARE

## 2022-12-06 ENCOUNTER — PATIENT MESSAGE (OUTPATIENT)
Dept: INTERNAL MEDICINE | Facility: CLINIC | Age: 67
End: 2022-12-06
Payer: MEDICARE

## 2022-12-06 ENCOUNTER — TELEPHONE (OUTPATIENT)
Dept: INTERNAL MEDICINE | Facility: CLINIC | Age: 67
End: 2022-12-06
Payer: MEDICARE

## 2022-12-06 NOTE — TELEPHONE ENCOUNTER
----- Message from Bacilio Larry MD sent at 12/5/2022  7:02 PM CST -----  Regarding: Follow up  Please contact and schedule a hospital follow up with me over the next week or two.

## 2022-12-07 DIAGNOSIS — E11.42 TYPE 2 DIABETES MELLITUS WITH DIABETIC POLYNEUROPATHY, WITHOUT LONG-TERM CURRENT USE OF INSULIN: ICD-10-CM

## 2022-12-07 RX ORDER — METFORMIN HYDROCHLORIDE 500 MG/1
1000 TABLET, EXTENDED RELEASE ORAL 2 TIMES DAILY WITH MEALS
Qty: 360 TABLET | Refills: 1 | OUTPATIENT
Start: 2022-12-07

## 2022-12-07 NOTE — TELEPHONE ENCOUNTER
----- Message from Barbie Giang MA sent at 12/2/2022  5:52 PM CST -----  Contact: pt    ----- Message -----  From: Radha Eller  Sent: 12/2/2022   3:25 PM CST  To: Carmen Baker Staff    Refill request. Script renewal is needed, pt states he is out and would like filled soon as possible        JANUVIA 100 mg Tab    metFORMIN (GLUCOPHAGE-XR) 500 MG ER 24hr tablet      [id number] [place] - [address]  AT [facility] [address]  [address] [place] [address]  Phone: [phone] Fax: [phone]

## 2022-12-07 NOTE — TELEPHONE ENCOUNTER
Called patient I left a message he's unable to get refill because he hasn't seen a provider in 2 yr.

## 2022-12-13 ENCOUNTER — TELEPHONE (OUTPATIENT)
Dept: CARDIOLOGY | Facility: CLINIC | Age: 67
End: 2022-12-13
Payer: MEDICARE

## 2022-12-13 ENCOUNTER — TELEPHONE (OUTPATIENT)
Dept: INTERNAL MEDICINE | Facility: CLINIC | Age: 67
End: 2022-12-13
Payer: MEDICARE

## 2022-12-13 RX ORDER — PANTOPRAZOLE SODIUM 40 MG/1
40 TABLET, DELAYED RELEASE ORAL DAILY
Qty: 90 TABLET | Refills: 3 | Status: SHIPPED | OUTPATIENT
Start: 2022-12-13 | End: 2024-01-18

## 2022-12-13 NOTE — TELEPHONE ENCOUNTER
----- Message from Leonor Caro MA sent at 12/13/2022 11:51 AM CST -----  Sabrina the patient would like to talk  to you about his heartburn last visit was on 11-4-2022 Dr. Tolentino please call [phone]. Thank you.

## 2022-12-13 NOTE — TELEPHONE ENCOUNTER
Pt c/o chest discomfort like heartburn, unrelieved by current treatment. However when drinks beer, and belches, the pain goes away immediately and he feels fine. He did add that he is trying to quit the beer drinking. I told him to talk to PCP about the heartburn to have current meds adjusted and provided him with the IM phone #. He verbalized understanding.

## 2022-12-13 NOTE — TELEPHONE ENCOUNTER
----- Message from Bacilio Larry MD sent at 12/13/2022  2:38 PM CST -----  Contact: [phone] Patient  A refill of Protonix was called in.  ----- Message -----  From: Chioma Chew MA  Sent: 12/13/2022  12:13 PM CST  To: Bacilio Larry MD    Please advise,  Thank You.    ----- Message -----  From: Corina Kwon  Sent: 12/13/2022  12:07 PM CST  To: Laron MANDEL Staff    1MEDICALADVICE     Patient is calling for Medical Advice regarding: pt stated he needs something for gas. Pt stated he has some pain in his chest but when he belches it goes away and he feels better.     How long has patient had these symptoms: 2 months and has gotten worse. Pt feels like he is having a heart attack but when he belches it is better    Pharmacy name and phone#:  [id number] - [place] - [address]  AT [facility] [address]  [address] [place] [address]  Phone: [phone] Fax: [phone]      Would like response via [employer]:  Call Back please    Comments:

## 2022-12-15 ENCOUNTER — OFFICE VISIT (OUTPATIENT)
Dept: INTERNAL MEDICINE | Facility: CLINIC | Age: 67
End: 2022-12-15
Payer: MEDICARE

## 2022-12-15 ENCOUNTER — LAB VISIT (OUTPATIENT)
Dept: LAB | Facility: HOSPITAL | Age: 67
End: 2022-12-15
Attending: INTERNAL MEDICINE
Payer: MEDICARE

## 2022-12-15 ENCOUNTER — PATIENT MESSAGE (OUTPATIENT)
Dept: INTERNAL MEDICINE | Facility: CLINIC | Age: 67
End: 2022-12-15
Payer: MEDICARE

## 2022-12-15 VITALS
HEART RATE: 63 BPM | HEIGHT: 66 IN | DIASTOLIC BLOOD PRESSURE: 68 MMHG | SYSTOLIC BLOOD PRESSURE: 118 MMHG | WEIGHT: 177 LBS | OXYGEN SATURATION: 98 % | BODY MASS INDEX: 28.45 KG/M2

## 2022-12-15 DIAGNOSIS — I48.0 PAROXYSMAL ATRIAL FIBRILLATION: ICD-10-CM

## 2022-12-15 DIAGNOSIS — F41.9 ANXIETY: ICD-10-CM

## 2022-12-15 DIAGNOSIS — R06.02 SOB (SHORTNESS OF BREATH): Primary | ICD-10-CM

## 2022-12-15 DIAGNOSIS — E11.69 TYPE 2 DIABETES MELLITUS WITH OTHER SPECIFIED COMPLICATION, WITHOUT LONG-TERM CURRENT USE OF INSULIN: ICD-10-CM

## 2022-12-15 DIAGNOSIS — F10.20 ALCOHOL USE DISORDER, MODERATE, DEPENDENCE: ICD-10-CM

## 2022-12-15 LAB
ALBUMIN SERPL BCP-MCNC: 4.4 G/DL (ref 3.5–5.2)
ALP SERPL-CCNC: 63 U/L (ref 55–135)
ALT SERPL W/O P-5'-P-CCNC: 29 U/L (ref 10–44)
ANION GAP SERPL CALC-SCNC: 9 MMOL/L (ref 8–16)
AST SERPL-CCNC: 46 U/L (ref 10–40)
BILIRUB SERPL-MCNC: 0.5 MG/DL (ref 0.1–1)
BUN SERPL-MCNC: 7 MG/DL (ref 8–23)
CALCIUM SERPL-MCNC: 9.4 MG/DL (ref 8.7–10.5)
CHLORIDE SERPL-SCNC: 97 MMOL/L (ref 95–110)
CO2 SERPL-SCNC: 27 MMOL/L (ref 23–29)
CREAT SERPL-MCNC: 0.8 MG/DL (ref 0.5–1.4)
EST. GFR  (NO RACE VARIABLE): >60 ML/MIN/1.73 M^2
ESTIMATED AVG GLUCOSE: 103 MG/DL (ref 68–131)
GLUCOSE SERPL-MCNC: 94 MG/DL (ref 70–110)
HBA1C MFR BLD: 5.2 % (ref 4–5.6)
POTASSIUM SERPL-SCNC: 3.9 MMOL/L (ref 3.5–5.1)
PROT SERPL-MCNC: 7.9 G/DL (ref 6–8.4)
SODIUM SERPL-SCNC: 133 MMOL/L (ref 136–145)

## 2022-12-15 PROCEDURE — 93010 EKG 12-LEAD: ICD-10-PCS | Mod: S$GLB,,, | Performed by: INTERNAL MEDICINE

## 2022-12-15 PROCEDURE — 3288F FALL RISK ASSESSMENT DOCD: CPT | Mod: CPTII,S$GLB,, | Performed by: INTERNAL MEDICINE

## 2022-12-15 PROCEDURE — 3044F HG A1C LEVEL LT 7.0%: CPT | Mod: CPTII,S$GLB,, | Performed by: INTERNAL MEDICINE

## 2022-12-15 PROCEDURE — 3008F PR BODY MASS INDEX (BMI) DOCUMENTED: ICD-10-PCS | Mod: CPTII,S$GLB,, | Performed by: INTERNAL MEDICINE

## 2022-12-15 PROCEDURE — 3044F PR MOST RECENT HEMOGLOBIN A1C LEVEL <7.0%: ICD-10-PCS | Mod: CPTII,S$GLB,, | Performed by: INTERNAL MEDICINE

## 2022-12-15 PROCEDURE — 99999 PR PBB SHADOW E&M-EST. PATIENT-LVL III: CPT | Mod: PBBFAC,,, | Performed by: INTERNAL MEDICINE

## 2022-12-15 PROCEDURE — 3072F PR LOW RISK FOR RETINOPATHY: ICD-10-PCS | Mod: CPTII,S$GLB,, | Performed by: INTERNAL MEDICINE

## 2022-12-15 PROCEDURE — 1126F AMNT PAIN NOTED NONE PRSNT: CPT | Mod: CPTII,S$GLB,, | Performed by: INTERNAL MEDICINE

## 2022-12-15 PROCEDURE — 36415 COLL VENOUS BLD VENIPUNCTURE: CPT | Performed by: INTERNAL MEDICINE

## 2022-12-15 PROCEDURE — 93010 ELECTROCARDIOGRAM REPORT: CPT | Mod: S$GLB,,, | Performed by: INTERNAL MEDICINE

## 2022-12-15 PROCEDURE — 99214 PR OFFICE/OUTPT VISIT, EST, LEVL IV, 30-39 MIN: ICD-10-PCS | Mod: S$GLB,,, | Performed by: INTERNAL MEDICINE

## 2022-12-15 PROCEDURE — 83036 HEMOGLOBIN GLYCOSYLATED A1C: CPT | Performed by: INTERNAL MEDICINE

## 2022-12-15 PROCEDURE — 99214 OFFICE O/P EST MOD 30 MIN: CPT | Mod: S$GLB,,, | Performed by: INTERNAL MEDICINE

## 2022-12-15 PROCEDURE — 3074F SYST BP LT 130 MM HG: CPT | Mod: CPTII,S$GLB,, | Performed by: INTERNAL MEDICINE

## 2022-12-15 PROCEDURE — 93005 ELECTROCARDIOGRAM TRACING: CPT | Mod: S$GLB,,, | Performed by: INTERNAL MEDICINE

## 2022-12-15 PROCEDURE — 3074F PR MOST RECENT SYSTOLIC BLOOD PRESSURE < 130 MM HG: ICD-10-PCS | Mod: CPTII,S$GLB,, | Performed by: INTERNAL MEDICINE

## 2022-12-15 PROCEDURE — 1101F PR PT FALLS ASSESS DOC 0-1 FALLS W/OUT INJ PAST YR: ICD-10-PCS | Mod: CPTII,S$GLB,, | Performed by: INTERNAL MEDICINE

## 2022-12-15 PROCEDURE — 1126F PR PAIN SEVERITY QUANTIFIED, NO PAIN PRESENT: ICD-10-PCS | Mod: CPTII,S$GLB,, | Performed by: INTERNAL MEDICINE

## 2022-12-15 PROCEDURE — 3288F PR FALLS RISK ASSESSMENT DOCUMENTED: ICD-10-PCS | Mod: CPTII,S$GLB,, | Performed by: INTERNAL MEDICINE

## 2022-12-15 PROCEDURE — 3078F DIAST BP <80 MM HG: CPT | Mod: CPTII,S$GLB,, | Performed by: INTERNAL MEDICINE

## 2022-12-15 PROCEDURE — 93005 EKG 12-LEAD: ICD-10-PCS | Mod: S$GLB,,, | Performed by: INTERNAL MEDICINE

## 2022-12-15 PROCEDURE — 3008F BODY MASS INDEX DOCD: CPT | Mod: CPTII,S$GLB,, | Performed by: INTERNAL MEDICINE

## 2022-12-15 PROCEDURE — 1101F PT FALLS ASSESS-DOCD LE1/YR: CPT | Mod: CPTII,S$GLB,, | Performed by: INTERNAL MEDICINE

## 2022-12-15 PROCEDURE — 3078F PR MOST RECENT DIASTOLIC BLOOD PRESSURE < 80 MM HG: ICD-10-PCS | Mod: CPTII,S$GLB,, | Performed by: INTERNAL MEDICINE

## 2022-12-15 PROCEDURE — 3072F LOW RISK FOR RETINOPATHY: CPT | Mod: CPTII,S$GLB,, | Performed by: INTERNAL MEDICINE

## 2022-12-15 PROCEDURE — 80053 COMPREHEN METABOLIC PANEL: CPT | Performed by: INTERNAL MEDICINE

## 2022-12-15 PROCEDURE — 99999 PR PBB SHADOW E&M-EST. PATIENT-LVL III: ICD-10-PCS | Mod: PBBFAC,,, | Performed by: INTERNAL MEDICINE

## 2022-12-15 RX ORDER — ALPRAZOLAM 0.5 MG/1
0.5 TABLET ORAL NIGHTLY PRN
Qty: 30 TABLET | Refills: 0 | Status: SHIPPED | OUTPATIENT
Start: 2022-12-15 | End: 2023-01-18

## 2022-12-15 NOTE — PROGRESS NOTES
CC:  Follow-up     HPI: Patient is a 67-year-old male with PAF, EtOH, hypertension , hyperlipidemia, elevated triglycerides, non insulin-dependent diabetes, reflux, gout, renal cell cancer status post nephrectomy and anxiety presents today as a follow-up.  Patient was seen in the emergency department following a syncopal episode which occurred at his dentist's office.  The patient was diagnosed with orthostatic hypotension.  The patient was drinking 14 or more beers a day.  Was felt that he was dehydrated.  Cardiac echo showed some wall motion abnormalities involving the left ventricle.  His magnesium level slightly low.  The patient was restarted on his flecainide and metoprolol.  Patient states that he is currently taking flecainide and metoprolol every other day.  May be taken daily, his blood pressure drops too much.  He is only taking a half of metoprolol when he does take it.  Two days ago, the patient states he could walk from his front door to his truck to put [employer] lights.  He reports drinking about 4 beers.  After he developed, he was able to  is [employer] lights.    ROS:  Patient reports no chest pain or shortness of breath.  He does report that leg gravy COVID lm given heartburn.  He was drinking 14-18 beers a day.  Since his recent hospitalization, he does not want to die.  He cut back to where he drinks 5 her 6 beers a day.    Physical exam:   General appearance:  No acute distress  HEENT: Trachea is midline without JVD   Pulmonary:  Good inspiratory, expiratory breath sounds are heard.  Lungs clear auscultation.    Cardiovascular:  S1-S2, rhythm is normal.  Extremities without edema.    GI: Abdomen was nontender, nondistended without hepatosplenomegaly     Assessment:  1.  Shortness of breath  2.  PAF  3.  Hypertension  4.  Continue use of alcohol  5.  Anxiety/depression     :  1.  Will check a CMP as well as EKG today  2.  The patient will follow-up pending test results.

## 2022-12-17 DIAGNOSIS — F10.20 ALCOHOL USE DISORDER, MODERATE, DEPENDENCE: Primary | ICD-10-CM

## 2022-12-17 DIAGNOSIS — K76.0 STEATOSIS OF LIVER: ICD-10-CM

## 2022-12-19 ENCOUNTER — TELEPHONE (OUTPATIENT)
Dept: INTERNAL MEDICINE | Facility: CLINIC | Age: 67
End: 2022-12-19
Payer: MEDICARE

## 2022-12-19 NOTE — TELEPHONE ENCOUNTER
----- Message from Bacilio Larry MD sent at 12/17/2022  8:27 PM CST -----  Please contact patient. I would like to get an ultrasound of his abdomen to look at his liver. I would like to see him in one month.

## 2022-12-20 ENCOUNTER — TELEPHONE (OUTPATIENT)
Dept: INTERNAL MEDICINE | Facility: CLINIC | Age: 67
End: 2022-12-20
Payer: MEDICARE

## 2022-12-20 NOTE — TELEPHONE ENCOUNTER
----- Message from Bacilio Larry MD sent at 12/17/2022  4:31 PM CST -----  Please notify that his EKG was normal. I would like to see him back in one month.

## 2022-12-20 NOTE — TELEPHONE ENCOUNTER
Called and spoke to pt. Relayed results message from PCP. Pt verbalized understanding. Pt scheduled for 1 month f/u.

## 2023-01-17 NOTE — TELEPHONE ENCOUNTER
Pt already has scheduled appt.   Hpi Title: Evaluation of a Skin Lesion How Severe Are Your Spot(S)?: mild Have Your Spot(S) Been Treated In The Past?: has not been treated

## 2023-01-19 ENCOUNTER — OFFICE VISIT (OUTPATIENT)
Dept: INTERNAL MEDICINE | Facility: CLINIC | Age: 68
End: 2023-01-19
Payer: MEDICARE

## 2023-01-19 VITALS
OXYGEN SATURATION: 97 % | WEIGHT: 185.88 LBS | BODY MASS INDEX: 29.87 KG/M2 | DIASTOLIC BLOOD PRESSURE: 64 MMHG | SYSTOLIC BLOOD PRESSURE: 122 MMHG | HEART RATE: 74 BPM | HEIGHT: 66 IN

## 2023-01-19 DIAGNOSIS — I48.0 PAROXYSMAL ATRIAL FIBRILLATION: ICD-10-CM

## 2023-01-19 DIAGNOSIS — F10.20 ALCOHOL USE DISORDER, MODERATE, DEPENDENCE: Primary | ICD-10-CM

## 2023-01-19 DIAGNOSIS — K76.0 STEATOSIS OF LIVER: ICD-10-CM

## 2023-01-19 PROCEDURE — 3074F SYST BP LT 130 MM HG: CPT | Mod: CPTII,S$GLB,, | Performed by: INTERNAL MEDICINE

## 2023-01-19 PROCEDURE — 3074F PR MOST RECENT SYSTOLIC BLOOD PRESSURE < 130 MM HG: ICD-10-PCS | Mod: CPTII,S$GLB,, | Performed by: INTERNAL MEDICINE

## 2023-01-19 PROCEDURE — 3078F DIAST BP <80 MM HG: CPT | Mod: CPTII,S$GLB,, | Performed by: INTERNAL MEDICINE

## 2023-01-19 PROCEDURE — 99999 PR PBB SHADOW E&M-EST. PATIENT-LVL V: ICD-10-PCS | Mod: PBBFAC,,, | Performed by: INTERNAL MEDICINE

## 2023-01-19 PROCEDURE — 99499 RISK ADDL DX/OHS AUDIT: ICD-10-PCS | Mod: S$GLB,,, | Performed by: INTERNAL MEDICINE

## 2023-01-19 PROCEDURE — 1160F PR REVIEW ALL MEDS BY PRESCRIBER/CLIN PHARMACIST DOCUMENTED: ICD-10-PCS | Mod: CPTII,S$GLB,, | Performed by: INTERNAL MEDICINE

## 2023-01-19 PROCEDURE — 3008F BODY MASS INDEX DOCD: CPT | Mod: CPTII,S$GLB,, | Performed by: INTERNAL MEDICINE

## 2023-01-19 PROCEDURE — 99499 UNLISTED E&M SERVICE: CPT | Mod: S$GLB,,, | Performed by: INTERNAL MEDICINE

## 2023-01-19 PROCEDURE — 1101F PR PT FALLS ASSESS DOC 0-1 FALLS W/OUT INJ PAST YR: ICD-10-PCS | Mod: CPTII,S$GLB,, | Performed by: INTERNAL MEDICINE

## 2023-01-19 PROCEDURE — 3078F PR MOST RECENT DIASTOLIC BLOOD PRESSURE < 80 MM HG: ICD-10-PCS | Mod: CPTII,S$GLB,, | Performed by: INTERNAL MEDICINE

## 2023-01-19 PROCEDURE — 3288F PR FALLS RISK ASSESSMENT DOCUMENTED: ICD-10-PCS | Mod: CPTII,S$GLB,, | Performed by: INTERNAL MEDICINE

## 2023-01-19 PROCEDURE — 1101F PT FALLS ASSESS-DOCD LE1/YR: CPT | Mod: CPTII,S$GLB,, | Performed by: INTERNAL MEDICINE

## 2023-01-19 PROCEDURE — 1159F PR MEDICATION LIST DOCUMENTED IN MEDICAL RECORD: ICD-10-PCS | Mod: CPTII,S$GLB,, | Performed by: INTERNAL MEDICINE

## 2023-01-19 PROCEDURE — 1159F MED LIST DOCD IN RCRD: CPT | Mod: CPTII,S$GLB,, | Performed by: INTERNAL MEDICINE

## 2023-01-19 PROCEDURE — 1126F PR PAIN SEVERITY QUANTIFIED, NO PAIN PRESENT: ICD-10-PCS | Mod: CPTII,S$GLB,, | Performed by: INTERNAL MEDICINE

## 2023-01-19 PROCEDURE — 99999 PR PBB SHADOW E&M-EST. PATIENT-LVL V: CPT | Mod: PBBFAC,,, | Performed by: INTERNAL MEDICINE

## 2023-01-19 PROCEDURE — 1160F RVW MEDS BY RX/DR IN RCRD: CPT | Mod: CPTII,S$GLB,, | Performed by: INTERNAL MEDICINE

## 2023-01-19 PROCEDURE — 3008F PR BODY MASS INDEX (BMI) DOCUMENTED: ICD-10-PCS | Mod: CPTII,S$GLB,, | Performed by: INTERNAL MEDICINE

## 2023-01-19 PROCEDURE — 99214 PR OFFICE/OUTPT VISIT, EST, LEVL IV, 30-39 MIN: ICD-10-PCS | Mod: S$GLB,,, | Performed by: INTERNAL MEDICINE

## 2023-01-19 PROCEDURE — 3288F FALL RISK ASSESSMENT DOCD: CPT | Mod: CPTII,S$GLB,, | Performed by: INTERNAL MEDICINE

## 2023-01-19 PROCEDURE — 99214 OFFICE O/P EST MOD 30 MIN: CPT | Mod: S$GLB,,, | Performed by: INTERNAL MEDICINE

## 2023-01-19 PROCEDURE — 1126F AMNT PAIN NOTED NONE PRSNT: CPT | Mod: CPTII,S$GLB,, | Performed by: INTERNAL MEDICINE
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
## 2023-02-09 DIAGNOSIS — Z00.00 ENCOUNTER FOR MEDICARE ANNUAL WELLNESS EXAM: ICD-10-CM

## 2023-02-23 DIAGNOSIS — G25.81 RESTLESS LEG: ICD-10-CM

## 2023-02-24 RX ORDER — ROPINIROLE 1 MG/1
TABLET, FILM COATED ORAL
Qty: 90 TABLET | OUTPATIENT
Start: 2023-02-24

## 2023-02-24 NOTE — TELEPHONE ENCOUNTER
Prescription was filled 11/2022 with refills to last until 11/2023, sent pt portal msg informing and to contact pharmacy for a refill.

## 2023-03-21 ENCOUNTER — TELEPHONE (OUTPATIENT)
Dept: ENDOCRINOLOGY | Facility: CLINIC | Age: 68
End: 2023-03-21
Payer: MEDICARE

## 2023-03-21 DIAGNOSIS — E11.42 TYPE 2 DIABETES MELLITUS WITH DIABETIC POLYNEUROPATHY, WITHOUT LONG-TERM CURRENT USE OF INSULIN: ICD-10-CM

## 2023-03-21 RX ORDER — METFORMIN HYDROCHLORIDE 500 MG/1
1000 TABLET, EXTENDED RELEASE ORAL 2 TIMES DAILY WITH MEALS
Qty: 360 TABLET | Refills: 3 | OUTPATIENT
Start: 2023-03-21

## 2023-03-21 NOTE — TELEPHONE ENCOUNTER
Called patient left a message and call back number. Patient needed refill but have not seen a provider in 2yrs.

## 2023-03-21 NOTE — TELEPHONE ENCOUNTER
Called patient because he needs to scheduled an appointment in order to get a refill on his medication it has been over 2yrs since seeing a provider. Left message and call back number.

## 2023-04-03 ENCOUNTER — PATIENT MESSAGE (OUTPATIENT)
Dept: ADMINISTRATIVE | Facility: HOSPITAL | Age: 68
End: 2023-04-03
Payer: MEDICARE

## 2023-04-13 ENCOUNTER — TELEPHONE (OUTPATIENT)
Dept: INTERNAL MEDICINE | Facility: CLINIC | Age: 68
End: 2023-04-13
Payer: MEDICARE

## 2023-04-13 NOTE — TELEPHONE ENCOUNTER
----- Message from Bacilio Larry MD sent at 4/13/2023  3:09 PM CDT -----  Regarding: Follow up  Please contact and schedule a follow up appointment with me.

## 2023-04-27 ENCOUNTER — OFFICE VISIT (OUTPATIENT)
Dept: PODIATRY | Facility: CLINIC | Age: 68
End: 2023-04-27
Payer: MEDICARE

## 2023-04-27 VITALS
WEIGHT: 184.94 LBS | HEART RATE: 68 BPM | SYSTOLIC BLOOD PRESSURE: 106 MMHG | HEIGHT: 66 IN | BODY MASS INDEX: 29.72 KG/M2 | DIASTOLIC BLOOD PRESSURE: 70 MMHG

## 2023-04-27 DIAGNOSIS — B35.1 ONYCHOMYCOSIS DUE TO DERMATOPHYTE: ICD-10-CM

## 2023-04-27 DIAGNOSIS — E11.49 TYPE II DIABETES MELLITUS WITH NEUROLOGICAL MANIFESTATIONS: Primary | ICD-10-CM

## 2023-04-27 DIAGNOSIS — L84 CORN OR CALLUS: ICD-10-CM

## 2023-04-27 DIAGNOSIS — B35.3 TINEA PEDIS OF BOTH FEET: ICD-10-CM

## 2023-04-27 PROCEDURE — 99203 PR OFFICE/OUTPT VISIT, NEW, LEVL III, 30-44 MIN: ICD-10-PCS | Mod: 25,S$GLB,, | Performed by: PODIATRIST

## 2023-04-27 PROCEDURE — 3078F PR MOST RECENT DIASTOLIC BLOOD PRESSURE < 80 MM HG: ICD-10-PCS | Mod: CPTII,S$GLB,, | Performed by: PODIATRIST

## 2023-04-27 PROCEDURE — 1159F PR MEDICATION LIST DOCUMENTED IN MEDICAL RECORD: ICD-10-PCS | Mod: CPTII,S$GLB,, | Performed by: PODIATRIST

## 2023-04-27 PROCEDURE — 11056 PARNG/CUTG B9 HYPRKR LES 2-4: CPT | Mod: Q9,S$GLB,, | Performed by: PODIATRIST

## 2023-04-27 PROCEDURE — 3074F PR MOST RECENT SYSTOLIC BLOOD PRESSURE < 130 MM HG: ICD-10-PCS | Mod: CPTII,S$GLB,, | Performed by: PODIATRIST

## 2023-04-27 PROCEDURE — 1159F MED LIST DOCD IN RCRD: CPT | Mod: CPTII,S$GLB,, | Performed by: PODIATRIST

## 2023-04-27 PROCEDURE — 11721 DEBRIDE NAIL 6 OR MORE: CPT | Mod: 59,Q9,S$GLB, | Performed by: PODIATRIST

## 2023-04-27 PROCEDURE — 3074F SYST BP LT 130 MM HG: CPT | Mod: CPTII,S$GLB,, | Performed by: PODIATRIST

## 2023-04-27 PROCEDURE — 1160F PR REVIEW ALL MEDS BY PRESCRIBER/CLIN PHARMACIST DOCUMENTED: ICD-10-PCS | Mod: CPTII,S$GLB,, | Performed by: PODIATRIST

## 2023-04-27 PROCEDURE — 3008F BODY MASS INDEX DOCD: CPT | Mod: CPTII,S$GLB,, | Performed by: PODIATRIST

## 2023-04-27 PROCEDURE — 3078F DIAST BP <80 MM HG: CPT | Mod: CPTII,S$GLB,, | Performed by: PODIATRIST

## 2023-04-27 PROCEDURE — 99203 OFFICE O/P NEW LOW 30 MIN: CPT | Mod: 25,S$GLB,, | Performed by: PODIATRIST

## 2023-04-27 PROCEDURE — 11056 PR TRIM BENIGN HYPERKERATOTIC SKIN LESION,2-4: ICD-10-PCS | Mod: Q9,S$GLB,, | Performed by: PODIATRIST

## 2023-04-27 PROCEDURE — 99999 PR PBB SHADOW E&M-EST. PATIENT-LVL IV: CPT | Mod: PBBFAC,,, | Performed by: PODIATRIST

## 2023-04-27 PROCEDURE — 3008F PR BODY MASS INDEX (BMI) DOCUMENTED: ICD-10-PCS | Mod: CPTII,S$GLB,, | Performed by: PODIATRIST

## 2023-04-27 PROCEDURE — 1126F PR PAIN SEVERITY QUANTIFIED, NO PAIN PRESENT: ICD-10-PCS | Mod: CPTII,S$GLB,, | Performed by: PODIATRIST

## 2023-04-27 PROCEDURE — 1126F AMNT PAIN NOTED NONE PRSNT: CPT | Mod: CPTII,S$GLB,, | Performed by: PODIATRIST

## 2023-04-27 PROCEDURE — 11721 PR DEBRIDEMENT OF NAILS, 6 OR MORE: ICD-10-PCS | Mod: 59,Q9,S$GLB, | Performed by: PODIATRIST

## 2023-04-27 PROCEDURE — 99999 PR PBB SHADOW E&M-EST. PATIENT-LVL IV: ICD-10-PCS | Mod: PBBFAC,,, | Performed by: PODIATRIST

## 2023-04-27 PROCEDURE — 1160F RVW MEDS BY RX/DR IN RCRD: CPT | Mod: CPTII,S$GLB,, | Performed by: PODIATRIST

## 2023-04-27 RX ORDER — KETOCONAZOLE 20 MG/G
CREAM TOPICAL DAILY
Qty: 60 G | Refills: 6 | Status: SHIPPED | OUTPATIENT
Start: 2023-04-27

## 2023-04-27 RX ORDER — OXYCODONE AND ACETAMINOPHEN 5; 325 MG/1; MG/1
1 TABLET ORAL EVERY 6 HOURS PRN
COMMUNITY
Start: 2023-01-13 | End: 2023-06-19

## 2023-04-27 RX ORDER — AMOXICILLIN 500 MG/1
TABLET, FILM COATED ORAL
COMMUNITY
Start: 2023-01-13 | End: 2023-06-21 | Stop reason: CLARIF

## 2023-04-27 RX ORDER — SERTRALINE HYDROCHLORIDE 50 MG/1
TABLET, FILM COATED ORAL DAILY
COMMUNITY
Start: 2023-01-25 | End: 2023-10-28 | Stop reason: CLARIF

## 2023-04-27 NOTE — PROGRESS NOTES
Subjective:      Patient ID: Donald Warren Abadie is a 67 y.o. male.    Chief Complaint: Foot Problem (Peeling and nail cracking) and Diabetes Mellitus (PCP-Bacilio Larry MD-1/19/2023/)    Kleber is a 67 y.o. male who presents to the clinic upon referral from Dr. Fontenot  for evaluation and treatment of diabetic feet. Kleber has a past medical history of A-fib, Alcohol abuse, Anxiety, Arthritis, Chronic gout, Diabetes mellitus, DM (diabetes mellitus) (11/16/2016), Fatty liver, Hyperlipidemia, Renal cell cancer (2014), and S/P TKR (total knee replacement), right (6/27/2019). Patient relates no major problem with feet. Only complaints today consist of elongated toe nails and rash on toes and bottom of both feet. Has numbness in toes so this does not bother him but he would like to get it assessed and treated.     PCP: Bacilio Larry MD    Date Last Seen by PCP: see epic    Current shoe gear: Casual shoes    Hemoglobin A1C   Date Value Ref Range Status   12/15/2022 5.2 4.0 - 5.6 % Final     Comment:     ADA Screening Guidelines:  5.7-6.4%  Consistent with prediabetes  >or=6.5%  Consistent with diabetes    High levels of fetal hemoglobin interfere with the HbA1C  assay. Heterozygous hemoglobin variants (HbS, HgC, etc)do  not significantly interfere with this assay.   However, presence of multiple variants may affect accuracy.     11/11/2022 5.1 4.0 - 5.6 % Final     Comment:     ADA Screening Guidelines:  5.7-6.4%  Consistent with prediabetes  >or=6.5%  Consistent with diabetes    High levels of fetal hemoglobin interfere with the HbA1C  assay. Heterozygous hemoglobin variants (HbS, HgC, etc)do  not significantly interfere with this assay.   However, presence of multiple variants may affect accuracy.     01/10/2022 5.5 4.0 - 5.6 % Final     Comment:     ADA Screening Guidelines:  5.7-6.4%  Consistent with prediabetes  >or=6.5%  Consistent with diabetes    High levels of fetal hemoglobin interfere with the HbA1C  assay.  Heterozygous hemoglobin variants (HbS, HgC, etc)do  not significantly interfere with this assay.   However, presence of multiple variants may affect accuracy.             Review of Systems   Constitutional: Negative for chills, decreased appetite, diaphoresis and fever.   HENT:  Negative for congestion and hearing loss.    Cardiovascular:  Negative for chest pain, claudication, leg swelling and syncope.   Respiratory:  Negative for cough and shortness of breath.    Skin:  Positive for dry skin, nail changes and rash. Negative for color change, flushing, itching, poor wound healing, suspicious lesions and unusual hair distribution.   Musculoskeletal:  Positive for back pain, joint pain, joint swelling and myalgias.   Gastrointestinal:  Negative for nausea and vomiting.   Neurological:  Positive for numbness. Negative for loss of balance, paresthesias and sensory change.   Psychiatric/Behavioral:  Negative for altered mental status, suicidal ideas and thoughts of violence. The patient is not nervous/anxious.          Objective:      Physical Exam  Constitutional:       General: He is not in acute distress.     Appearance: He is well-developed. He is not diaphoretic.   Cardiovascular:      Pulses:           Dorsalis pedis pulses are 1+ on the right side and 1+ on the left side.        Posterior tibial pulses are 1+ on the right side and 1+ on the left side.      Comments: Skin temperature is within normal limits. Toes are cool to touch and feet are warm proximally. Hair growth is diminished. Skin is mildly atrophic and with mild hyperpigmentation. Mild edema noted, jesus.   Musculoskeletal:         General: Deformity present. No tenderness.      Right foot: Decreased range of motion. Deformity present.      Left foot: Decreased range of motion. Deformity present.      Comments: Adequate joint range of motion without pain, limitation, nor crepitation to bilateral feet and ankle joints. Muscle strength is 5/5 in all groups  bilaterally.    No POP noted, jesus   Feet:      Right foot:      Protective Sensation: 10 sites tested.  7 sites sensed.      Skin integrity: Dry skin present. No blister, erythema or warmth.      Left foot:      Protective Sensation: 10 sites tested.  7 sites sensed.      Skin integrity: Dry skin present. No blister, erythema or warmth.   Lymphadenopathy:      Comments: Negative lymphadenopathy bilateral popliteal fossa and tarsal tunnel.    Skin:     General: Skin is warm and dry.      Coloration: Skin is not pale.      Findings: No abrasion, bruising, burn, erythema, laceration, petechiae or rash.      Nails: There is no clubbing.      Comments: No open lesions, lacerations or wounds noted. Nails are thickened, elongated, discolored yellow/brown with subungual debris and brittleness to R 1-5 and L 1-5. Interdigital spaces clean, dry and intact b/l. Hyperkeratotic lesion noted to plantar medial hallux IPJ b/l. Skin lines present to lesion/s. No signs of deep tissue injury.     Neurological:      Mental Status: He is alert and oriented to person, place, and time.      Sensory: Sensory deficit present.      Motor: No abnormal muscle tone.      Comments: Alcolu-Heath 5.07 monofilamant testing is diminished. Sharp/dull sensation is diminished bilaterally. Light touch is diminished bilaterally. Vibratory sensation is diminished jesus   Psychiatric:         Behavior: Behavior normal.         Thought Content: Thought content normal.         Judgment: Judgment normal.             Assessment:       Encounter Diagnoses   Name Primary?    Type II diabetes mellitus with neurological manifestations Yes    Onychomycosis due to dermatophyte     Corn or callus     Tinea pedis of both feet            Plan:       Kleber was seen today for foot problem and diabetes mellitus.    Diagnoses and all orders for this visit:    Type II diabetes mellitus with neurological manifestations    Onychomycosis due to dermatophyte    Corn or  callus    Tinea pedis of both feet    Other orders  -     ketoconazole (NIZORAL) 2 % cream; Apply topically once daily. Apply to affected skin from toes to heels twice a day for 3-4 weeks.        I counseled the patient on his conditions, their implications and medical management.     - Shoe inspection. Diabetic Foot Education. Patient reminded of the importance of good nutrition and blood sugar control to help prevent podiatric complications of diabetes. Patient instructed on proper foot hygeine. We discussed wearing proper shoe gear, daily foot inspections, never walking without protective shoe gear, caution putting sharp instruments to feet     - Discussed DM foot care:  Wear comfortable, proper fitting shoes. Wash feet daily. Dry well. After drying, apply moisturizer to feet (no lotion to webspaces). Inspect feet daily for skin breaks, blisters, swelling, or redness. Wear cotton socks (preferably white)  Change socks every day. Do NOT walk barefoot. Do NOT use heating pads or warm/hot water soaks     With patient's permission, nails were aggressively reduced and debrided 1-5 b/l, removing all offending nail and debris. Patient tolerated this well and no blood was drawn. Patient reports relief following the procedure.     The affected area was cleansed with an alcohol prep pad. Next, utilizing a 5mm curette, the hyperkeratotic tissues were trimmed from plantar medial hallux IPJ b/l, down to appropriate level of skin. Care was taken to remove any nucleated core from the center of the lesion. No pinpoint bleeding was encountered. The patient tolerated relief following this procedure.     Rx Ketoconazole cream to be applied to affected area of foot rash twice a day for minimum 2 weeks or until resolution.      Long discussion with patient regarding appropriate, supportive and comfortable shoes. Recommended supportive style shoe brands with adequate arch supports to alleviate abnormal pressure and improve stability  of foot while walking. Avoid flat shoes and barefoot walking as these will exacerbate or worsen symptoms. Advised to wear with socks.     RTC 3 months, sooner PRN

## 2023-05-05 ENCOUNTER — PES CALL (OUTPATIENT)
Dept: ADMINISTRATIVE | Facility: CLINIC | Age: 68
End: 2023-05-05
Payer: MEDICARE

## 2023-05-16 ENCOUNTER — PATIENT MESSAGE (OUTPATIENT)
Dept: INTERNAL MEDICINE | Facility: CLINIC | Age: 68
End: 2023-05-16
Payer: MEDICARE

## 2023-05-17 DIAGNOSIS — E11.9 TYPE 2 DIABETES MELLITUS WITHOUT COMPLICATION: ICD-10-CM

## 2023-05-22 ENCOUNTER — PATIENT MESSAGE (OUTPATIENT)
Dept: ADMINISTRATIVE | Facility: HOSPITAL | Age: 68
End: 2023-05-22
Payer: MEDICARE

## 2023-05-23 DIAGNOSIS — E11.42 TYPE 2 DIABETES MELLITUS WITH DIABETIC POLYNEUROPATHY, WITHOUT LONG-TERM CURRENT USE OF INSULIN: ICD-10-CM

## 2023-05-23 RX ORDER — METFORMIN HYDROCHLORIDE 500 MG/1
1000 TABLET, EXTENDED RELEASE ORAL 2 TIMES DAILY WITH MEALS
Qty: 360 TABLET | Refills: 3 | Status: SHIPPED | OUTPATIENT
Start: 2023-05-23

## 2023-05-24 ENCOUNTER — PATIENT MESSAGE (OUTPATIENT)
Dept: ADMINISTRATIVE | Facility: HOSPITAL | Age: 68
End: 2023-05-24
Payer: MEDICARE

## 2023-05-24 ENCOUNTER — PATIENT OUTREACH (OUTPATIENT)
Dept: ADMINISTRATIVE | Facility: HOSPITAL | Age: 68
End: 2023-05-24
Payer: MEDICARE

## 2023-05-24 NOTE — PROGRESS NOTES
Health Maintenance Due   Topic Date Due    Pneumococcal Vaccines (Age 65+) (2 - PCV) 10/21/2017    Diabetes Urine Screening  10/17/2020    COVID-19 Vaccine (4 - Booster for Pfizer series) 12/23/2021    Eye Exam  05/10/2022      Chart reviewed. Triggered LINKS. Updated Care Everywhere. Portal message sent asking pt to schedule health maintenance screenings.    Ave Sorenson CMA  Population Health Care Coordinator  Primary Care Team

## 2023-06-19 ENCOUNTER — OFFICE VISIT (OUTPATIENT)
Dept: INTERNAL MEDICINE | Facility: CLINIC | Age: 68
End: 2023-06-19
Payer: MEDICARE

## 2023-06-19 ENCOUNTER — LAB VISIT (OUTPATIENT)
Dept: LAB | Facility: HOSPITAL | Age: 68
End: 2023-06-19
Attending: STUDENT IN AN ORGANIZED HEALTH CARE EDUCATION/TRAINING PROGRAM
Payer: MEDICARE

## 2023-06-19 VITALS
SYSTOLIC BLOOD PRESSURE: 104 MMHG | HEIGHT: 66 IN | BODY MASS INDEX: 29.8 KG/M2 | DIASTOLIC BLOOD PRESSURE: 64 MMHG | OXYGEN SATURATION: 99 % | WEIGHT: 185.44 LBS | HEART RATE: 76 BPM

## 2023-06-19 VITALS
WEIGHT: 186.06 LBS | HEART RATE: 80 BPM | SYSTOLIC BLOOD PRESSURE: 132 MMHG | BODY MASS INDEX: 29.9 KG/M2 | OXYGEN SATURATION: 98 % | RESPIRATION RATE: 16 BRPM | DIASTOLIC BLOOD PRESSURE: 68 MMHG | HEIGHT: 66 IN

## 2023-06-19 DIAGNOSIS — F33.41 RECURRENT MAJOR DEPRESSIVE DISORDER, IN PARTIAL REMISSION: ICD-10-CM

## 2023-06-19 DIAGNOSIS — E11.9 TYPE 2 DIABETES MELLITUS WITHOUT COMPLICATION, WITHOUT LONG-TERM CURRENT USE OF INSULIN: ICD-10-CM

## 2023-06-19 DIAGNOSIS — I48.92 ATRIAL FLUTTER, UNSPECIFIED TYPE: ICD-10-CM

## 2023-06-19 DIAGNOSIS — I50.32 CHRONIC DIASTOLIC CONGESTIVE HEART FAILURE: ICD-10-CM

## 2023-06-19 DIAGNOSIS — Z85.528 PERSONAL HISTORY OF KIDNEY CANCER: ICD-10-CM

## 2023-06-19 DIAGNOSIS — F10.20 ALCOHOL USE DISORDER, MODERATE, DEPENDENCE: Primary | ICD-10-CM

## 2023-06-19 DIAGNOSIS — Z00.00 ENCOUNTER FOR MEDICARE ANNUAL WELLNESS EXAM: ICD-10-CM

## 2023-06-19 DIAGNOSIS — M1A.09X0 IDIOPATHIC CHRONIC GOUT OF MULTIPLE SITES WITHOUT TOPHUS: ICD-10-CM

## 2023-06-19 DIAGNOSIS — Z00.00 ENCOUNTER FOR PREVENTIVE HEALTH EXAMINATION: Primary | ICD-10-CM

## 2023-06-19 DIAGNOSIS — F10.20 ALCOHOL USE DISORDER, MODERATE, DEPENDENCE: ICD-10-CM

## 2023-06-19 DIAGNOSIS — K21.9 GASTROESOPHAGEAL REFLUX DISEASE, UNSPECIFIED WHETHER ESOPHAGITIS PRESENT: ICD-10-CM

## 2023-06-19 DIAGNOSIS — F41.1 GENERALIZED ANXIETY DISORDER: ICD-10-CM

## 2023-06-19 DIAGNOSIS — E83.42 HYPOMAGNESEMIA: ICD-10-CM

## 2023-06-19 DIAGNOSIS — I48.0 PAROXYSMAL ATRIAL FIBRILLATION: ICD-10-CM

## 2023-06-19 DIAGNOSIS — M15.9 PRIMARY OSTEOARTHRITIS INVOLVING MULTIPLE JOINTS: ICD-10-CM

## 2023-06-19 DIAGNOSIS — I70.0 AORTIC ATHEROSCLEROSIS: ICD-10-CM

## 2023-06-19 DIAGNOSIS — K76.0 FATTY LIVER: ICD-10-CM

## 2023-06-19 PROBLEM — M25.612 DECREASED ROM OF LEFT SHOULDER: Status: RESOLVED | Noted: 2021-06-01 | Resolved: 2023-06-19

## 2023-06-19 PROBLEM — R55 SYNCOPE AND COLLAPSE: Status: RESOLVED | Noted: 2022-11-29 | Resolved: 2023-06-19

## 2023-06-19 PROBLEM — R29.898 WEAKNESS OF SHOULDER: Status: RESOLVED | Noted: 2021-06-01 | Resolved: 2023-06-19

## 2023-06-19 PROBLEM — M25.512 SHOULDER PAIN, LEFT: Status: RESOLVED | Noted: 2021-06-01 | Resolved: 2023-06-19

## 2023-06-19 LAB
ALBUMIN SERPL BCP-MCNC: 3.7 G/DL (ref 3.5–5.2)
ALP SERPL-CCNC: 53 U/L (ref 55–135)
ALT SERPL W/O P-5'-P-CCNC: 20 U/L (ref 10–44)
ANION GAP SERPL CALC-SCNC: 16 MMOL/L (ref 8–16)
AST SERPL-CCNC: 40 U/L (ref 10–40)
BASOPHILS # BLD AUTO: 0.06 K/UL (ref 0–0.2)
BASOPHILS NFR BLD: 0.6 % (ref 0–1.9)
BILIRUB SERPL-MCNC: 0.6 MG/DL (ref 0.1–1)
BUN SERPL-MCNC: 5 MG/DL (ref 8–23)
CALCIUM SERPL-MCNC: 8.9 MG/DL (ref 8.7–10.5)
CHLORIDE SERPL-SCNC: 93 MMOL/L (ref 95–110)
CO2 SERPL-SCNC: 18 MMOL/L (ref 23–29)
CREAT SERPL-MCNC: 0.8 MG/DL (ref 0.5–1.4)
DIFFERENTIAL METHOD: ABNORMAL
EOSINOPHIL # BLD AUTO: 0.2 K/UL (ref 0–0.5)
EOSINOPHIL NFR BLD: 1.7 % (ref 0–8)
ERYTHROCYTE [DISTWIDTH] IN BLOOD BY AUTOMATED COUNT: 14.4 % (ref 11.5–14.5)
EST. GFR  (NO RACE VARIABLE): >60 ML/MIN/1.73 M^2
GLUCOSE SERPL-MCNC: 80 MG/DL (ref 70–110)
HCT VFR BLD AUTO: 34.4 % (ref 40–54)
HGB BLD-MCNC: 11.7 G/DL (ref 14–18)
IMM GRANULOCYTES # BLD AUTO: 0.04 K/UL (ref 0–0.04)
IMM GRANULOCYTES NFR BLD AUTO: 0.4 % (ref 0–0.5)
LYMPHOCYTES # BLD AUTO: 2.6 K/UL (ref 1–4.8)
LYMPHOCYTES NFR BLD: 28 % (ref 18–48)
MAGNESIUM SERPL-MCNC: 1 MG/DL (ref 1.6–2.6)
MCH RBC QN AUTO: 32 PG (ref 27–31)
MCHC RBC AUTO-ENTMCNC: 34 G/DL (ref 32–36)
MCV RBC AUTO: 94 FL (ref 82–98)
MONOCYTES # BLD AUTO: 0.9 K/UL (ref 0.3–1)
MONOCYTES NFR BLD: 10.2 % (ref 4–15)
NEUTROPHILS # BLD AUTO: 5.5 K/UL (ref 1.8–7.7)
NEUTROPHILS NFR BLD: 59.1 % (ref 38–73)
NRBC BLD-RTO: 0 /100 WBC
PLATELET # BLD AUTO: 131 K/UL (ref 150–450)
PMV BLD AUTO: 9.4 FL (ref 9.2–12.9)
POTASSIUM SERPL-SCNC: 3.8 MMOL/L (ref 3.5–5.1)
PROT SERPL-MCNC: 6.8 G/DL (ref 6–8.4)
RBC # BLD AUTO: 3.66 M/UL (ref 4.6–6.2)
SODIUM SERPL-SCNC: 127 MMOL/L (ref 136–145)
VIT B12 SERPL-MCNC: 483 PG/ML (ref 210–950)
WBC # BLD AUTO: 9.26 K/UL (ref 3.9–12.7)

## 2023-06-19 PROCEDURE — 3078F PR MOST RECENT DIASTOLIC BLOOD PRESSURE < 80 MM HG: ICD-10-PCS | Mod: CPTII,S$GLB,, | Performed by: NURSE PRACTITIONER

## 2023-06-19 PROCEDURE — 99499 RISK ADDL DX/OHS AUDIT: ICD-10-PCS | Mod: S$GLB,,, | Performed by: NURSE PRACTITIONER

## 2023-06-19 PROCEDURE — 1126F AMNT PAIN NOTED NONE PRSNT: CPT | Mod: CPTII,S$GLB,, | Performed by: STUDENT IN AN ORGANIZED HEALTH CARE EDUCATION/TRAINING PROGRAM

## 2023-06-19 PROCEDURE — 3078F DIAST BP <80 MM HG: CPT | Mod: CPTII,S$GLB,, | Performed by: STUDENT IN AN ORGANIZED HEALTH CARE EDUCATION/TRAINING PROGRAM

## 2023-06-19 PROCEDURE — 1126F AMNT PAIN NOTED NONE PRSNT: CPT | Mod: CPTII,S$GLB,, | Performed by: NURSE PRACTITIONER

## 2023-06-19 PROCEDURE — 99214 PR OFFICE/OUTPT VISIT, EST, LEVL IV, 30-39 MIN: ICD-10-PCS | Mod: S$GLB,,, | Performed by: STUDENT IN AN ORGANIZED HEALTH CARE EDUCATION/TRAINING PROGRAM

## 2023-06-19 PROCEDURE — 1159F MED LIST DOCD IN RCRD: CPT | Mod: CPTII,S$GLB,, | Performed by: STUDENT IN AN ORGANIZED HEALTH CARE EDUCATION/TRAINING PROGRAM

## 2023-06-19 PROCEDURE — 3288F FALL RISK ASSESSMENT DOCD: CPT | Mod: CPTII,S$GLB,, | Performed by: STUDENT IN AN ORGANIZED HEALTH CARE EDUCATION/TRAINING PROGRAM

## 2023-06-19 PROCEDURE — G0439 PPPS, SUBSEQ VISIT: HCPCS | Mod: S$GLB,,, | Performed by: NURSE PRACTITIONER

## 2023-06-19 PROCEDURE — 1159F PR MEDICATION LIST DOCUMENTED IN MEDICAL RECORD: ICD-10-PCS | Mod: CPTII,S$GLB,, | Performed by: STUDENT IN AN ORGANIZED HEALTH CARE EDUCATION/TRAINING PROGRAM

## 2023-06-19 PROCEDURE — 1170F PR FUNCTIONAL STATUS ASSESSED: ICD-10-PCS | Mod: CPTII,S$GLB,, | Performed by: NURSE PRACTITIONER

## 2023-06-19 PROCEDURE — 1160F PR REVIEW ALL MEDS BY PRESCRIBER/CLIN PHARMACIST DOCUMENTED: ICD-10-PCS | Mod: CPTII,S$GLB,, | Performed by: NURSE PRACTITIONER

## 2023-06-19 PROCEDURE — 1126F PR PAIN SEVERITY QUANTIFIED, NO PAIN PRESENT: ICD-10-PCS | Mod: CPTII,S$GLB,, | Performed by: NURSE PRACTITIONER

## 2023-06-19 PROCEDURE — 1126F PR PAIN SEVERITY QUANTIFIED, NO PAIN PRESENT: ICD-10-PCS | Mod: CPTII,S$GLB,, | Performed by: STUDENT IN AN ORGANIZED HEALTH CARE EDUCATION/TRAINING PROGRAM

## 2023-06-19 PROCEDURE — 3008F PR BODY MASS INDEX (BMI) DOCUMENTED: ICD-10-PCS | Mod: CPTII,S$GLB,, | Performed by: STUDENT IN AN ORGANIZED HEALTH CARE EDUCATION/TRAINING PROGRAM

## 2023-06-19 PROCEDURE — 3075F PR MOST RECENT SYSTOLIC BLOOD PRESS GE 130-139MM HG: ICD-10-PCS | Mod: CPTII,S$GLB,, | Performed by: STUDENT IN AN ORGANIZED HEALTH CARE EDUCATION/TRAINING PROGRAM

## 2023-06-19 PROCEDURE — 3078F PR MOST RECENT DIASTOLIC BLOOD PRESSURE < 80 MM HG: ICD-10-PCS | Mod: CPTII,S$GLB,, | Performed by: STUDENT IN AN ORGANIZED HEALTH CARE EDUCATION/TRAINING PROGRAM

## 2023-06-19 PROCEDURE — 3288F PR FALLS RISK ASSESSMENT DOCUMENTED: ICD-10-PCS | Mod: CPTII,S$GLB,, | Performed by: STUDENT IN AN ORGANIZED HEALTH CARE EDUCATION/TRAINING PROGRAM

## 2023-06-19 PROCEDURE — 3078F DIAST BP <80 MM HG: CPT | Mod: CPTII,S$GLB,, | Performed by: NURSE PRACTITIONER

## 2023-06-19 PROCEDURE — 1101F PR PT FALLS ASSESS DOC 0-1 FALLS W/OUT INJ PAST YR: ICD-10-PCS | Mod: CPTII,S$GLB,, | Performed by: STUDENT IN AN ORGANIZED HEALTH CARE EDUCATION/TRAINING PROGRAM

## 2023-06-19 PROCEDURE — G0439 PR MEDICARE ANNUAL WELLNESS SUBSEQUENT VISIT: ICD-10-PCS | Mod: S$GLB,,, | Performed by: NURSE PRACTITIONER

## 2023-06-19 PROCEDURE — 99214 OFFICE O/P EST MOD 30 MIN: CPT | Mod: S$GLB,,, | Performed by: STUDENT IN AN ORGANIZED HEALTH CARE EDUCATION/TRAINING PROGRAM

## 2023-06-19 PROCEDURE — 85025 COMPLETE CBC W/AUTO DIFF WBC: CPT | Performed by: STUDENT IN AN ORGANIZED HEALTH CARE EDUCATION/TRAINING PROGRAM

## 2023-06-19 PROCEDURE — 99499 UNLISTED E&M SERVICE: CPT | Mod: S$GLB,,, | Performed by: NURSE PRACTITIONER

## 2023-06-19 PROCEDURE — 84425 ASSAY OF VITAMIN B-1: CPT | Performed by: STUDENT IN AN ORGANIZED HEALTH CARE EDUCATION/TRAINING PROGRAM

## 2023-06-19 PROCEDURE — 3288F FALL RISK ASSESSMENT DOCD: CPT | Mod: CPTII,S$GLB,, | Performed by: NURSE PRACTITIONER

## 2023-06-19 PROCEDURE — 1170F FXNL STATUS ASSESSED: CPT | Mod: CPTII,S$GLB,, | Performed by: NURSE PRACTITIONER

## 2023-06-19 PROCEDURE — 82607 VITAMIN B-12: CPT | Performed by: STUDENT IN AN ORGANIZED HEALTH CARE EDUCATION/TRAINING PROGRAM

## 2023-06-19 PROCEDURE — 99999 PR PBB SHADOW E&M-EST. PATIENT-LVL V: ICD-10-PCS | Mod: PBBFAC,,, | Performed by: NURSE PRACTITIONER

## 2023-06-19 PROCEDURE — 3075F SYST BP GE 130 - 139MM HG: CPT | Mod: CPTII,S$GLB,, | Performed by: STUDENT IN AN ORGANIZED HEALTH CARE EDUCATION/TRAINING PROGRAM

## 2023-06-19 PROCEDURE — 1101F PT FALLS ASSESS-DOCD LE1/YR: CPT | Mod: CPTII,S$GLB,, | Performed by: NURSE PRACTITIONER

## 2023-06-19 PROCEDURE — 3074F PR MOST RECENT SYSTOLIC BLOOD PRESSURE < 130 MM HG: ICD-10-PCS | Mod: CPTII,S$GLB,, | Performed by: NURSE PRACTITIONER

## 2023-06-19 PROCEDURE — 80053 COMPREHEN METABOLIC PANEL: CPT | Performed by: STUDENT IN AN ORGANIZED HEALTH CARE EDUCATION/TRAINING PROGRAM

## 2023-06-19 PROCEDURE — 99999 PR PBB SHADOW E&M-EST. PATIENT-LVL IV: ICD-10-PCS | Mod: PBBFAC,,, | Performed by: STUDENT IN AN ORGANIZED HEALTH CARE EDUCATION/TRAINING PROGRAM

## 2023-06-19 PROCEDURE — 99999 PR PBB SHADOW E&M-EST. PATIENT-LVL IV: CPT | Mod: PBBFAC,,, | Performed by: STUDENT IN AN ORGANIZED HEALTH CARE EDUCATION/TRAINING PROGRAM

## 2023-06-19 PROCEDURE — 83735 ASSAY OF MAGNESIUM: CPT | Performed by: STUDENT IN AN ORGANIZED HEALTH CARE EDUCATION/TRAINING PROGRAM

## 2023-06-19 PROCEDURE — 1159F PR MEDICATION LIST DOCUMENTED IN MEDICAL RECORD: ICD-10-PCS | Mod: CPTII,S$GLB,, | Performed by: NURSE PRACTITIONER

## 2023-06-19 PROCEDURE — 3008F BODY MASS INDEX DOCD: CPT | Mod: CPTII,S$GLB,, | Performed by: STUDENT IN AN ORGANIZED HEALTH CARE EDUCATION/TRAINING PROGRAM

## 2023-06-19 PROCEDURE — 36415 COLL VENOUS BLD VENIPUNCTURE: CPT | Performed by: STUDENT IN AN ORGANIZED HEALTH CARE EDUCATION/TRAINING PROGRAM

## 2023-06-19 PROCEDURE — 3008F PR BODY MASS INDEX (BMI) DOCUMENTED: ICD-10-PCS | Mod: CPTII,S$GLB,, | Performed by: NURSE PRACTITIONER

## 2023-06-19 PROCEDURE — 99999 PR PBB SHADOW E&M-EST. PATIENT-LVL V: CPT | Mod: PBBFAC,,, | Performed by: NURSE PRACTITIONER

## 2023-06-19 PROCEDURE — 3288F PR FALLS RISK ASSESSMENT DOCUMENTED: ICD-10-PCS | Mod: CPTII,S$GLB,, | Performed by: NURSE PRACTITIONER

## 2023-06-19 PROCEDURE — 1159F MED LIST DOCD IN RCRD: CPT | Mod: CPTII,S$GLB,, | Performed by: NURSE PRACTITIONER

## 2023-06-19 PROCEDURE — 1101F PR PT FALLS ASSESS DOC 0-1 FALLS W/OUT INJ PAST YR: ICD-10-PCS | Mod: CPTII,S$GLB,, | Performed by: NURSE PRACTITIONER

## 2023-06-19 PROCEDURE — 1160F RVW MEDS BY RX/DR IN RCRD: CPT | Mod: CPTII,S$GLB,, | Performed by: NURSE PRACTITIONER

## 2023-06-19 PROCEDURE — 1101F PT FALLS ASSESS-DOCD LE1/YR: CPT | Mod: CPTII,S$GLB,, | Performed by: STUDENT IN AN ORGANIZED HEALTH CARE EDUCATION/TRAINING PROGRAM

## 2023-06-19 PROCEDURE — 3008F BODY MASS INDEX DOCD: CPT | Mod: CPTII,S$GLB,, | Performed by: NURSE PRACTITIONER

## 2023-06-19 PROCEDURE — 3074F SYST BP LT 130 MM HG: CPT | Mod: CPTII,S$GLB,, | Performed by: NURSE PRACTITIONER

## 2023-06-19 NOTE — PROGRESS NOTES
Subjective     Patient ID: Donald Warren Abadie is a 67 y.o. male.    Chief Complaint: LAB WORK    HPI  Patient is a 66 yo male here lab work before being admitted for rehab. He had a medicare wellness visit today but they were unable to order labs which is why he is seeing me now. They do not have a place they are going to yet. His daughter is looking for one.   -failed mini mental at his annual visit. Will check thiamine and b12 levels. Pt is taking b12 right now    Review of Systems   Constitutional:  Negative for chills and fever.   HENT:  Negative for rhinorrhea and sore throat.    Respiratory:  Negative for cough, chest tightness and shortness of breath.    Cardiovascular:  Negative for chest pain.   Gastrointestinal:  Negative for constipation and diarrhea.   Genitourinary:  Negative for dysuria and hematuria.   Neurological:  Negative for dizziness, light-headedness and headaches.        Objective     Physical Exam  Vitals and nursing note reviewed.   Constitutional:       Appearance: Normal appearance.   Eyes:      Conjunctiva/sclera: Conjunctivae normal.   Cardiovascular:      Rate and Rhythm: Normal rate and regular rhythm.      Heart sounds: Normal heart sounds.   Pulmonary:      Effort: Pulmonary effort is normal.      Breath sounds: Normal breath sounds.   Musculoskeletal:      Cervical back: Normal range of motion.      Right lower leg: No edema.      Left lower leg: No edema.   Skin:     General: Skin is warm.   Neurological:      Mental Status: He is alert and oriented to person, place, and time.   Psychiatric:         Mood and Affect: Mood normal.         Behavior: Behavior normal.          Assessment and Plan     1. Alcohol use disorder, moderate, dependence  Assessment & Plan:  Pt with alcohol abuse. Having mild memory issues and pt wanted labs prior to detox. They are looking for a rehab program. Will check cbc, cmp, b12, thiamine and magnesium    Orders:  -     Comprehensive Metabolic Panel;  Future; Expected date: 06/19/2023  -     CBC Auto Differential; Future; Expected date: 06/19/2023  -     VITAMIN B1; Future; Expected date: 06/19/2023  -     VITAMIN B12; Future; Expected date: 06/19/2023    2. Hypomagnesemia  -     Magnesium; Future; Expected date: 06/19/2023

## 2023-06-19 NOTE — PATIENT INSTRUCTIONS
Counseling and Referral of Other Preventative  (Italic type indicates deductible and co-insurance are waived)    Patient Name: Donald Abadie  Today's Date: 6/19/2023    Health Maintenance       Date Due Completion Date    Pneumococcal Vaccines (Age 65+) (2 - PCV) 10/21/2017 10/21/2016    Diabetes Urine Screening 10/17/2020 10/17/2019    COVID-19 Vaccine (4 - Pfizer series) 12/23/2021 10/28/2021    Eye Exam 05/10/2022 5/10/2021    Override on 5/10/2021: Done    Hemoglobin A1c 06/15/2023 12/15/2022    Colorectal Cancer Screening 06/27/2023 (Originally 1955) 6/22/2012    Lipid Panel 11/11/2023 11/11/2022    High Dose Statin 04/27/2024 4/27/2023    Foot Exam 04/27/2024 4/27/2023    Override on 4/8/2021: Done    TETANUS VACCINE 04/13/2031 4/13/2021        No orders of the defined types were placed in this encounter.      The following information is provided to all patients.  This information is to help you find resources for any of the problems found today that may be affecting your health:                Living healthy guide: www.[facility].gov      Understanding Diabetes: www.diabetes.org      Eating healthy: www.cdc.gov/healthyweight      CDC home safety checklist: www.cdc.gov/steadi/patient.html      Agency on Aging: www.goea.[place].[facility]      Alcoholics anonymous (AA): www.aa.org      Physical Activity: www.steve.nih.gov/ne3worl      Tobacco use: www.quitwithusla.org

## 2023-06-19 NOTE — ASSESSMENT & PLAN NOTE
Pt with alcohol abuse. Having mild memory issues and pt wanted labs prior to detox. They are looking for a rehab program. Will check cbc, cmp, b12, thiamine and magnesium

## 2023-06-19 NOTE — PROGRESS NOTES
"  Donald Abadie presented for a  Medicare AWV and comprehensive Health Risk Assessment today. The following components were reviewed and updated:    Medical history  Family History  Social history  Allergies and Current Medications  Health Risk Assessment  Health Maintenance  Care Team         ** See Completed Assessments for Annual Wellness Visit within the encounter summary.**         The following assessments were completed:  Living Situation  CAGE  Depression Screening  Timed Get Up and Go  Whisper Test  Cognitive Function Screening      Nutrition Screening  ADL Screening  PAQ Screening  Patient does not have a prescription for narcotics. Patient does not use substance.          Vitals:    06/19/23 1007   BP: 104/64   Pulse: 76   SpO2: 99%   Weight: 84.1 kg (185 lb 6.5 oz)   Height: 5' 6" (1.676 m)     Body mass index is 29.93 kg/m².  Physical Exam  Vitals and nursing note reviewed.   Constitutional:       Appearance: He is well-developed.   HENT:      Head: Normocephalic.   Cardiovascular:      Rate and Rhythm: Normal rate and regular rhythm.   Pulmonary:      Effort: Pulmonary effort is normal.      Breath sounds: Normal breath sounds.   Abdominal:      General: Bowel sounds are normal.      Palpations: Abdomen is soft.   Musculoskeletal:         General: Normal range of motion.   Skin:     General: Skin is warm and dry.   Neurological:      Mental Status: He is alert and oriented to person, place, and time.      Motor: No abnormal muscle tone.   Psychiatric:         Mood and Affect: Mood normal.             Diagnoses and health risks identified today and associated recommendations/orders:    1. Encounter for preventive health examination  Here for Health Risk Assessment/Annual Wellness Visit.  Health maintenance reviewed and updated. Follow up in one year.     2. Aortic atherosclerosis  Chronic, stable on current medications. Noted CT Abdomen/Pelvis 2/28/19. Followed by PCP, Cardiology.    3. Paroxysmal " atrial fibrillation  Chronic, stable on current medications. Followed by PCP, Cardiology.    4. Atrial flutter, unspecified type  Chronic, stable on current medications. Followed by PCP, Cardiology.    5. Chronic diastolic congestive heart failure  Chronic, stable on current medications. Followed by PCP, Cardiology.    6. Alcohol use disorder, moderate, dependence  Chronic, reports that he has increased ETOH intake to 12 beers daily (is is accompanied by his daughter who reports higher intake). He reports increased anxiety. CAGE score 3. He states he is willing to attempt rehab again and is agreeable to referral. Daughter is concerned about possible physiologic effects of his increased intake and would like lab drawn - Same Day appointment was scheduled for further evaluation/treatment. She is also concerned about his cognitive function. Mini-Cog score 2/5. Discussed the impact of long term ETOH intake on cognition and would not recommend further evaluation/treatment with Neuropsychology evaluation until ETOH dependence is addressed.  - Ambulatory referral/consult to Social Work; Future  - Ambulatory referral/consult to Addiction Specialist; Future    7. Recurrent major depressive disorder, in partial remission  Chronic, stable on current medications. PHQ-2 score 0. Followed by PCP, Psychiatry.     8. Generalized anxiety disorder  Chronic, stable on current medications. PHQ-2 score 0. Followed by PCP, Psychiatry.    9. Type 2 diabetes mellitus without complication, without long-term current use of insulin  Chronic, stable on current medications. Last A1c 5.2. Followed by PCP.     10. Personal history of kidney cancer  Followed by PCP, Urology.    11. Gastroesophageal reflux disease, unspecified whether esophagitis present  Chronic, stable on current medication. Followed by PCP.     12. Fatty liver  Chronic, stable. Followed by PCP.    13. Idiopathic chronic gout of multiple sites without tophus  Chronic, stable on  current medication. Followed by PCP., Rheumatology     14. Primary osteoarthritis involving multiple joints  Chronic, stable. Followed by PCP.    15. Encounter for Medicare annual wellness exam  - Ambulatory Referral/Consult to Enhanced Annual Wellness Visit (eAWV)      Provided Kleber with a 5-10 year written screening schedule and personal prevention plan. Recommendations were developed using the USPSTF age appropriate recommendations. Education, counseling, and referrals were provided as needed. After Visit Summary printed and given to patient which includes a list of additional screenings\tests needed.    Follow up in about 3 months (around 9/19/2023).with PCP    Emma Spencer NP

## 2023-06-21 ENCOUNTER — HOSPITAL ENCOUNTER (EMERGENCY)
Facility: HOSPITAL | Age: 68
Discharge: HOME | End: 2023-06-21
Attending: EMERGENCY MEDICINE
Payer: MEDICARE

## 2023-06-21 VITALS
SYSTOLIC BLOOD PRESSURE: 126 MMHG | HEIGHT: 66 IN | TEMPERATURE: 98 F | WEIGHT: 186 LBS | HEART RATE: 74 BPM | BODY MASS INDEX: 29.89 KG/M2 | OXYGEN SATURATION: 98 % | RESPIRATION RATE: 16 BRPM | DIASTOLIC BLOOD PRESSURE: 74 MMHG

## 2023-06-21 DIAGNOSIS — F10.10 ALCOHOL ABUSE: Primary | ICD-10-CM

## 2023-06-21 DIAGNOSIS — E83.42 HYPOMAGNESEMIA: ICD-10-CM

## 2023-06-21 LAB
ALBUMIN SERPL BCP-MCNC: 3.8 G/DL (ref 3.5–5.2)
ALP SERPL-CCNC: 56 U/L (ref 55–135)
ALT SERPL W/O P-5'-P-CCNC: 18 U/L (ref 10–44)
AMPHET+METHAMPHET UR QL: NEGATIVE
ANION GAP SERPL CALC-SCNC: 13 MMOL/L (ref 8–16)
APAP SERPL-MCNC: <3 UG/ML (ref 10–20)
AST SERPL-CCNC: 34 U/L (ref 10–40)
BARBITURATES UR QL SCN>200 NG/ML: NEGATIVE
BASOPHILS # BLD AUTO: 0.06 K/UL (ref 0–0.2)
BASOPHILS NFR BLD: 0.9 % (ref 0–1.9)
BENZODIAZ UR QL SCN>200 NG/ML: ABNORMAL
BILIRUB SERPL-MCNC: 0.3 MG/DL (ref 0.1–1)
BILIRUB UR QL STRIP: NEGATIVE
BUN SERPL-MCNC: 9 MG/DL (ref 8–23)
BZE UR QL SCN: NEGATIVE
CALCIUM SERPL-MCNC: 8.8 MG/DL (ref 8.7–10.5)
CANNABINOIDS UR QL SCN: ABNORMAL
CHLORIDE SERPL-SCNC: 102 MMOL/L (ref 95–110)
CK SERPL-CCNC: 72 U/L (ref 20–200)
CLARITY UR REFRACT.AUTO: CLEAR
CO2 SERPL-SCNC: 24 MMOL/L (ref 23–29)
COLOR UR AUTO: YELLOW
CREAT SERPL-MCNC: 0.8 MG/DL (ref 0.5–1.4)
CREAT UR-MCNC: 87 MG/DL (ref 23–375)
DIFFERENTIAL METHOD: ABNORMAL
EOSINOPHIL # BLD AUTO: 0.1 K/UL (ref 0–0.5)
EOSINOPHIL NFR BLD: 1.3 % (ref 0–8)
ERYTHROCYTE [DISTWIDTH] IN BLOOD BY AUTOMATED COUNT: 15.2 % (ref 11.5–14.5)
EST. GFR  (NO RACE VARIABLE): >60 ML/MIN/1.73 M^2
ETHANOL SERPL-MCNC: <10 MG/DL
GLUCOSE SERPL-MCNC: 111 MG/DL (ref 70–110)
GLUCOSE UR QL STRIP: NEGATIVE
HCT VFR BLD AUTO: 38.1 % (ref 40–54)
HCV AB SERPL QL IA: NORMAL
HGB BLD-MCNC: 12.8 G/DL (ref 14–18)
HGB UR QL STRIP: NEGATIVE
HIV 1+2 AB+HIV1 P24 AG SERPL QL IA: NORMAL
IMM GRANULOCYTES # BLD AUTO: 0.04 K/UL (ref 0–0.04)
IMM GRANULOCYTES NFR BLD AUTO: 0.6 % (ref 0–0.5)
KETONES UR QL STRIP: NEGATIVE
LEUKOCYTE ESTERASE UR QL STRIP: NEGATIVE
LYMPHOCYTES # BLD AUTO: 1.7 K/UL (ref 1–4.8)
LYMPHOCYTES NFR BLD: 25.2 % (ref 18–48)
MAGNESIUM SERPL-MCNC: 1.1 MG/DL (ref 1.6–2.6)
MCH RBC QN AUTO: 32.8 PG (ref 27–31)
MCHC RBC AUTO-ENTMCNC: 33.6 G/DL (ref 32–36)
MCV RBC AUTO: 98 FL (ref 82–98)
METHADONE UR QL SCN>300 NG/ML: NEGATIVE
MONOCYTES # BLD AUTO: 0.7 K/UL (ref 0.3–1)
MONOCYTES NFR BLD: 10.5 % (ref 4–15)
NEUTROPHILS # BLD AUTO: 4.1 K/UL (ref 1.8–7.7)
NEUTROPHILS NFR BLD: 61.5 % (ref 38–73)
NITRITE UR QL STRIP: NEGATIVE
NRBC BLD-RTO: 0 /100 WBC
OPIATES UR QL SCN: NEGATIVE
PCP UR QL SCN>25 NG/ML: NEGATIVE
PH UR STRIP: 5 [PH] (ref 5–8)
PLATELET # BLD AUTO: 141 K/UL (ref 150–450)
PMV BLD AUTO: 9 FL (ref 9.2–12.9)
POTASSIUM SERPL-SCNC: 4.2 MMOL/L (ref 3.5–5.1)
PROT SERPL-MCNC: 7.2 G/DL (ref 6–8.4)
PROT UR QL STRIP: NEGATIVE
RBC # BLD AUTO: 3.9 M/UL (ref 4.6–6.2)
SODIUM SERPL-SCNC: 139 MMOL/L (ref 136–145)
SP GR UR STRIP: 1.01 (ref 1–1.03)
TOXICOLOGY INFORMATION: ABNORMAL
TSH SERPL DL<=0.005 MIU/L-ACNC: 1.9 UIU/ML (ref 0.4–4)
URN SPEC COLLECT METH UR: NORMAL
WBC # BLD AUTO: 6.67 K/UL (ref 3.9–12.7)

## 2023-06-21 PROCEDURE — 80053 COMPREHEN METABOLIC PANEL: CPT | Performed by: EMERGENCY MEDICINE

## 2023-06-21 PROCEDURE — 85025 COMPLETE CBC W/AUTO DIFF WBC: CPT | Performed by: EMERGENCY MEDICINE

## 2023-06-21 PROCEDURE — 86803 HEPATITIS C AB TEST: CPT | Performed by: PHYSICIAN ASSISTANT

## 2023-06-21 PROCEDURE — 82550 ASSAY OF CK (CPK): CPT | Performed by: EMERGENCY MEDICINE

## 2023-06-21 PROCEDURE — 84443 ASSAY THYROID STIM HORMONE: CPT | Performed by: EMERGENCY MEDICINE

## 2023-06-21 PROCEDURE — 82077 ASSAY SPEC XCP UR&BREATH IA: CPT | Performed by: EMERGENCY MEDICINE

## 2023-06-21 PROCEDURE — 99284 PR EMERGENCY DEPT VISIT,LEVEL IV: ICD-10-PCS | Mod: ,,, | Performed by: EMERGENCY MEDICINE

## 2023-06-21 PROCEDURE — 81003 URINALYSIS AUTO W/O SCOPE: CPT | Performed by: EMERGENCY MEDICINE

## 2023-06-21 PROCEDURE — 87389 HIV-1 AG W/HIV-1&-2 AB AG IA: CPT | Performed by: PHYSICIAN ASSISTANT

## 2023-06-21 PROCEDURE — 83735 ASSAY OF MAGNESIUM: CPT | Performed by: EMERGENCY MEDICINE

## 2023-06-21 PROCEDURE — 96365 THER/PROPH/DIAG IV INF INIT: CPT

## 2023-06-21 PROCEDURE — 99284 EMERGENCY DEPT VISIT MOD MDM: CPT | Mod: 25

## 2023-06-21 PROCEDURE — 80307 DRUG TEST PRSMV CHEM ANLYZR: CPT | Performed by: EMERGENCY MEDICINE

## 2023-06-21 PROCEDURE — 80143 DRUG ASSAY ACETAMINOPHEN: CPT | Performed by: EMERGENCY MEDICINE

## 2023-06-21 PROCEDURE — 25000003 PHARM REV CODE 250: Performed by: EMERGENCY MEDICINE

## 2023-06-21 PROCEDURE — 96361 HYDRATE IV INFUSION ADD-ON: CPT

## 2023-06-21 PROCEDURE — 63600175 PHARM REV CODE 636 W HCPCS: Performed by: EMERGENCY MEDICINE

## 2023-06-21 PROCEDURE — 99284 EMERGENCY DEPT VISIT MOD MDM: CPT | Mod: ,,, | Performed by: EMERGENCY MEDICINE

## 2023-06-21 RX ORDER — DIAZEPAM 5 MG/1
10 TABLET ORAL
Status: COMPLETED | OUTPATIENT
Start: 2023-06-21 | End: 2023-06-21

## 2023-06-21 RX ORDER — LANOLIN ALCOHOL/MO/W.PET/CERES
400 CREAM (GRAM) TOPICAL DAILY
Qty: 14 TABLET | Refills: 0 | Status: SHIPPED | OUTPATIENT
Start: 2023-06-21

## 2023-06-21 RX ORDER — MAGNESIUM SULFATE HEPTAHYDRATE 40 MG/ML
2 INJECTION, SOLUTION INTRAVENOUS
Status: COMPLETED | OUTPATIENT
Start: 2023-06-21 | End: 2023-06-21

## 2023-06-21 RX ADMIN — FOLIC ACID: 5 INJECTION, SOLUTION INTRAMUSCULAR; INTRAVENOUS; SUBCUTANEOUS at 03:06

## 2023-06-21 RX ADMIN — DIAZEPAM 10 MG: 5 TABLET ORAL at 02:06

## 2023-06-21 RX ADMIN — MAGNESIUM SULFATE 2 G: 2 INJECTION INTRAVENOUS at 03:06

## 2023-06-21 NOTE — PLAN OF CARE
DEENA contacted Rachelle [phone] at [place] for pt admission to detox program.  Rachelle asked information be faxed to [phone].    DEENA faxed the following to Rachelle:  Labs  Demographics  Alcohol Level    DEENA spoke with Rachelle again and she asked the TSH and toxicology screen to be faxed    DEENA faxed information to Rachelle.      Rachelle asked for report to be called to [phone] (opt 2) and speak with nurse Vargas.    Pt family to transport pt to facility when ready      Alison Wood CD, MSW, LMSW, RSW   Case Management  [facility]  Email: [email or web address]

## 2023-06-21 NOTE — ED NOTES
and MD at bedside to talk to pt. And family. Pt son at bedside calling [facility] per  request. They have accepted pt and needed them to answer some questions.

## 2023-06-21 NOTE — ED PROVIDER NOTES
"Encounter Date: 6/21/2023       History     Chief Complaint   Patient presents with    Medical Clearance     States needs medical clearance for detox     67-year-old gentleman with past medical history of chronic alcohol abuse, diabetes presents for evaluation requesting medical clearance for detox.  He reports that he is going to rehab and was told that he needed lab work.  Son-in-law is at bedside and provides most of the history.  States that he did have blood work done earlier this week and a very low magnesium level was noted in they were told to come here for evaluation of this.  Patient reports that his last drink was yesterday.  He reports that he drinks about 5 beers a day.     The history is provided by the patient.   Review of patient's allergies indicates:   Allergen Reactions    No known drug allergies      Past Medical History:   Diagnosis Date    A-fib     Alcohol abuse     Anxiety     Arthritis     Chronic gout     Diabetes mellitus     DM (diabetes mellitus) 11/16/2016    "boarderline, not taking any meds currently"    Fatty liver     Hyperlipidemia     Renal cell cancer 2014    S/P TKR (total knee replacement), right 6/27/2019     Past Surgical History:   Procedure Laterality Date    ABSCESS DRAINAGE      perirectal    COLONOSCOPY      HAND SURGERY Left     JOINT REPLACEMENT      knee replacement right knee    KIDNEY SURGERY      partial left kidney removal - [place]    PARTIAL NEPHRECTOMY Left August 2014    PERCUTANEOUS CRYOTHERAPY OF PERIPHERAL NERVE USING LIQUID NITROUS OXIDE IN CLOSED NEEDLE DEVICE Right 6/17/2019    Procedure: CRYOTHERAPY, NERVE, PERIPHERAL, PERCUTANEOUS, USING LIQUID NITROUS OXIDE IN CLOSED NEEDLE DEVICE-right knee iovera;  Surgeon: Donny Hair III, MD;  Location: [facility] CATH LAB;  Service: Pain Management;  Laterality: Right;    TOTAL KNEE ARTHROPLASTY Right 6/27/2019    Procedure: ARTHROPLASTY, KNEE, TOTAL-SAME DAY;  Surgeon: Mikael Huerta MD;  Location: [facility] OR " 2ND FLR;  Service: Orthopedics;  Laterality: Right;     Family History   Problem Relation Age of Onset    Lung cancer Father     Colon cancer Father     Cancer Father         lung cancer    Diabetes Mother     Alzheimer's disease Mother     Lung cancer Sister     Cancer Sister         lung    Colon polyps Brother     Alcohol abuse Brother     Alzheimer's disease Brother     Cirrhosis Neg Hx      Social History     Tobacco Use    Smoking status: Never    Smokeless tobacco: Never   Substance Use Topics    Alcohol use: Yes     Alcohol/week: 84.0 standard drinks     Types: 84 Cans of beer per week     Comment: 12 beers daily    Drug use: No     Review of Systems    Physical Exam     Initial Vitals [06/21/23 1237]   BP Pulse Resp Temp SpO2   129/70 89 15 98 °F (36.7 °C) 97 %      MAP       --         Physical Exam    Nursing note and vitals reviewed.  Constitutional: He appears well-developed and well-nourished.   HENT:   Head: Normocephalic.   Eyes: Conjunctivae are normal.   Neck: Trachea normal.   Cardiovascular:  Normal rate, regular rhythm, intact distal pulses and normal pulses.           Pulmonary/Chest: Breath sounds normal. No tachypnea. No respiratory distress.   Abdominal: Abdomen is soft. There is no abdominal tenderness.     Neurological: He is alert and oriented to person, place, and time.   No significant tremor   Skin: Skin is warm.       ED Course   Procedures  Labs Reviewed   CBC W/ AUTO DIFFERENTIAL - Abnormal; Notable for the following components:       Result Value    RBC 3.90 (*)     Hemoglobin 12.8 (*)     Hematocrit 38.1 (*)     MCH 32.8 (*)     RDW 15.2 (*)     Platelets 141 (*)     MPV 9.0 (*)     Immature Granulocytes 0.6 (*)     All other components within normal limits   COMPREHENSIVE METABOLIC PANEL - Abnormal; Notable for the following components:    Glucose 111 (*)     All other components within normal limits   DRUG SCREEN PANEL, URINE EMERGENCY - Abnormal; Notable for the following  components:    Benzodiazepines Presumptive Positive (*)     THC Presumptive Positive (*)     All other components within normal limits    Narrative:     Specimen Source->Urine   ACETAMINOPHEN LEVEL - Abnormal; Notable for the following components:    Acetaminophen (Tylenol), Serum <3.0 (*)     All other components within normal limits   MAGNESIUM - Abnormal; Notable for the following components:    Magnesium 1.1 (*)     All other components within normal limits   HIV 1 / 2 ANTIBODY    Narrative:     Release to patient->Immediate   HEPATITIS C ANTIBODY    Narrative:     Release to patient->Immediate   TSH   URINALYSIS, REFLEX TO URINE CULTURE    Narrative:     Specimen Source->Urine   ALCOHOL,MEDICAL (ETHANOL)   CK          Imaging Results    None          Medications   diazePAM tablet 10 mg (10 mg Oral Given 6/21/23 1435)   sodium chloride 0.9% 1,000 mL with mvi, (ADULT) no.4 with vit K 3,300 unit- 150 mcg/10 mL 10 mL, thiamine 100 mg, folic acid 1 mg infusion ( Intravenous Stopped 6/21/23 1644)   magnesium sulfate 2g in water 50mL IVPB (premix) (0 g Intravenous Stopped 6/21/23 1617)     Medical Decision Making:   Initial Assessment:   Evaluation of chronic alcohol abuse  Differential Diagnosis:   Alcohol withdrawal syndrome, electrolyte derangement, dehydration  Clinical Tests:   Lab Tests: Ordered and Reviewed  ED Management:  Patient has no signs or symptoms of active alcohol withdrawal.  Valium was given to prevent onset of symptoms.  Lab work checked.  Magnesium level low.  Repleted with IV medication and prescribed oral medication to take.  Patient was evaluated by social work in the emergency department and placement at rehab facility facilitated.  Patient is stable for discharge.  Return precautions advised           ED Course as of 06/21/23 1654   Wed Jun 21, 2023   1513 Magnesium(!): 1.1 [HS]   1514 Will repeat with IV medication  [HS]   1545 Marijuana (THC) Metabolite(!): Presumptive Positive [HS]   1545  Benzodiazepines(!): Presumptive Positive [HS]      ED Course User Index  [HS] Radha Howard MD                 Clinical Impression:   Final diagnoses:  [F10.10] Alcohol abuse (Primary)  [E83.42] Hypomagnesemia        ED Disposition Condition    Discharge Stable          ED Prescriptions       Medication Sig Dispense Start Date End Date Auth. Provider    magnesium oxide (MAG-OX) 400 mg (241.3 mg magnesium) tablet Take 1 tablet (400 mg total) by mouth once daily. 14 tablet 6/21/2023 -- Radha Howard MD          Follow-up Information       Follow up With Specialties Details Why Contact Info    Bacilio Larry MD Internal Medicine   [address]  [place] [address]  [phone]               Radha Howard MD  06/21/23 7174

## 2023-06-23 ENCOUNTER — PATIENT OUTREACH (OUTPATIENT)
Dept: ADMINISTRATIVE | Facility: HOSPITAL | Age: 68
End: 2023-06-23
Payer: MEDICARE

## 2023-06-23 ENCOUNTER — PATIENT MESSAGE (OUTPATIENT)
Dept: ADMINISTRATIVE | Facility: HOSPITAL | Age: 68
End: 2023-06-23
Payer: MEDICARE

## 2023-06-23 DIAGNOSIS — E11.9 TYPE 2 DIABETES MELLITUS WITHOUT COMPLICATION, WITHOUT LONG-TERM CURRENT USE OF INSULIN: Primary | ICD-10-CM

## 2023-06-23 LAB — VIT B1 BLD-MCNC: 54 UG/L (ref 38–122)

## 2023-06-23 NOTE — PROGRESS NOTES
Health Maintenance Due   Topic Date Due    Pneumococcal Vaccines (Age 65+) (2 - PCV) 10/21/2017    Diabetes Urine Screening  10/17/2020    COVID-19 Vaccine (4 - Pfizer series) 12/23/2021    Eye Exam  05/10/2022    Hemoglobin A1c  06/15/2023       HM updated . Chart reviewed for [employer] Gap Report.Outreached to schedule Diabetic labs .     Ana Carreno LPN   Clinical Care Coordinator  [facility] Wellness

## 2023-06-26 ENCOUNTER — CLINICAL SUPPORT (OUTPATIENT)
Dept: ENDOSCOPY | Facility: HOSPITAL | Age: 68
End: 2023-06-26
Attending: INTERNAL MEDICINE
Payer: MEDICARE

## 2023-06-26 DIAGNOSIS — Z12.11 COLON CANCER SCREENING: ICD-10-CM

## 2023-06-26 NOTE — PLAN OF CARE
3rd attempt to contact patient to schedule colonoscopy. Spoke with patient's daughter and she stated that he is not wanting to schedule at the moment due to other medical issues. Patient will call to schedule when ready and main line given to patient's daughter to call to schedule when ready. Patient's daughter verbalized understanding.

## 2023-07-03 DIAGNOSIS — F41.9 ANXIETY: ICD-10-CM

## 2023-07-03 RX ORDER — ALPRAZOLAM 0.5 MG/1
0.5 TABLET ORAL NIGHTLY PRN
Qty: 30 TABLET | Refills: 0 | OUTPATIENT
Start: 2023-07-03

## 2023-07-03 NOTE — TELEPHONE ENCOUNTER
No care due was identified.  [facility] Embedded Care Due Messages. Reference number: [id number].   7/03/2023 2:23:29 PM CDT

## 2023-07-03 NOTE — TELEPHONE ENCOUNTER
----- Message from David Lewis sent at 7/3/2023  1:35 PM CDT -----  Regarding: refill  Contact: Kleber at [phone]  Type:  RX Refill Request    Who Called:  Kleber  Refill or New Rx:  refill    RX Name and Strength:    ALPRAZolam (XANAX) 0.5 MG tablet 30 tablet 0 5/16/2023    Sig: TAKE 1 TABLET(0.5 MG) BY MOUTH EVERY NIGHT AS NEEDED FOR ANXIETY   Sent to pharmacy as: ALPRAZolam (XANAX) 0.5 MG tablet   E-Prescribing Status: Receipt confirmed by pharmacy (5/16/2023  3:03 PM CDT)     How is the patient currently taking it? (ex. 1XDay):  see above  Is this a 30 day or 90 day RX:  see above    Preferred Pharmacy with phone number:    [id number] - [place] - [address]  AT [facility] [address]  [address] [place] [address]  Phone: [phone] Fax: [phone]    Local or Mail Order:  local    Ordering Provider:  Dr Laron Chong Call Back Number:  [phone]    Additional Information:  pt is out of med.

## 2023-07-22 DIAGNOSIS — I48.0 PAROXYSMAL ATRIAL FIBRILLATION: ICD-10-CM

## 2023-07-22 DIAGNOSIS — E78.1 HYPERTRIGLYCERIDEMIA: ICD-10-CM

## 2023-07-24 RX ORDER — ROSUVASTATIN CALCIUM 20 MG/1
TABLET, COATED ORAL
Qty: 90 TABLET | Refills: 3 | Status: SHIPPED | OUTPATIENT
Start: 2023-07-24

## 2023-07-24 RX ORDER — FLECAINIDE ACETATE 100 MG/1
TABLET ORAL
Qty: 180 TABLET | Refills: 3 | Status: ON HOLD | OUTPATIENT
Start: 2023-07-24 | End: 2023-10-29 | Stop reason: HOSPADM

## 2023-07-28 ENCOUNTER — TELEPHONE (OUTPATIENT)
Dept: PODIATRY | Facility: CLINIC | Age: 68
End: 2023-07-28
Payer: MEDICARE

## 2023-07-28 NOTE — TELEPHONE ENCOUNTER
Spoke with patient to help schedule appointment voice understanding to new appointment date and time.

## 2023-08-18 ENCOUNTER — PATIENT MESSAGE (OUTPATIENT)
Dept: INTERNAL MEDICINE | Facility: CLINIC | Age: 68
End: 2023-08-18
Payer: MEDICARE

## 2023-08-25 ENCOUNTER — TELEPHONE (OUTPATIENT)
Dept: ENDOCRINOLOGY | Facility: CLINIC | Age: 68
End: 2023-08-25
Payer: MEDICARE

## 2023-09-13 ENCOUNTER — PATIENT MESSAGE (OUTPATIENT)
Dept: ADMINISTRATIVE | Facility: HOSPITAL | Age: 68
End: 2023-09-13
Payer: MEDICARE

## 2023-09-26 ENCOUNTER — TELEPHONE (OUTPATIENT)
Dept: INTERNAL MEDICINE | Facility: CLINIC | Age: 68
End: 2023-09-26
Payer: MEDICARE

## 2023-09-26 NOTE — TELEPHONE ENCOUNTER
----- Message from Baron Manzanares sent at 9/26/2023 12:59 PM CDT -----  Contact: Daughter Chani [phone]  Consult    The patient's daughter wants to know if you can order an open MRI for the brain for the patient to get done prior to the neurology visit.    Thank you

## 2023-09-28 DIAGNOSIS — R41.3 OTHER AMNESIA: Primary | ICD-10-CM

## 2023-10-03 ENCOUNTER — TELEPHONE (OUTPATIENT)
Dept: INTERNAL MEDICINE | Facility: CLINIC | Age: 68
End: 2023-10-03
Payer: MEDICARE

## 2023-10-03 NOTE — TELEPHONE ENCOUNTER
The patient's daughter wants to know if you can order an open MRI for the brain for the patient to get done prior to the neurology visit. Need external MRI order for open MRI.    Review and respond

## 2023-10-03 NOTE — TELEPHONE ENCOUNTER
----- Message from Caron Hyman sent at 10/3/2023 12:35 PM CDT -----  Contact: [phone]  [email or web address]     Patient is calling for Medical Advice regarding: MRI     How long has patient had these symptoms:    Pharmacy name and phone#:    Would like response via tapvivahart:  call back     Comments:    Pt needs a call back about questions on MRI

## 2023-10-04 DIAGNOSIS — R41.3 OTHER AMNESIA: Primary | ICD-10-CM

## 2023-10-18 ENCOUNTER — TELEPHONE (OUTPATIENT)
Dept: INTERNAL MEDICINE | Facility: CLINIC | Age: 68
End: 2023-10-18
Payer: MEDICARE

## 2023-10-18 NOTE — TELEPHONE ENCOUNTER
----- Message from Rebeca Johnson sent at 10/17/2023  2:11 PM CDT -----  Contact: [phone]  Pt has some questions in regards to medications he is on and discuss having a MRI. Please call.                      Thank you

## 2023-10-28 ENCOUNTER — HOSPITAL ENCOUNTER (OUTPATIENT)
Facility: HOSPITAL | Age: 68
Discharge: HOME | End: 2023-10-29
Attending: EMERGENCY MEDICINE | Admitting: STUDENT IN AN ORGANIZED HEALTH CARE EDUCATION/TRAINING PROGRAM
Payer: MEDICARE

## 2023-10-28 DIAGNOSIS — I48.91 ATRIAL FIBRILLATION, UNSPECIFIED TYPE: Primary | ICD-10-CM

## 2023-10-28 DIAGNOSIS — F41.9 ANXIETY: ICD-10-CM

## 2023-10-28 DIAGNOSIS — R06.02 SOB (SHORTNESS OF BREATH): ICD-10-CM

## 2023-10-28 DIAGNOSIS — I48.11 LONGSTANDING PERSISTENT ATRIAL FIBRILLATION: ICD-10-CM

## 2023-10-28 DIAGNOSIS — R07.9 CHEST PAIN: ICD-10-CM

## 2023-10-28 DIAGNOSIS — I48.0 PAROXYSMAL ATRIAL FIBRILLATION: ICD-10-CM

## 2023-10-28 PROBLEM — E87.1 HYPONATREMIA: Status: RESOLVED | Noted: 2019-03-15 | Resolved: 2023-10-28

## 2023-10-28 PROBLEM — K21.9 GERD (GASTROESOPHAGEAL REFLUX DISEASE): Chronic | Status: ACTIVE | Noted: 2019-04-02

## 2023-10-28 PROBLEM — F41.1 GENERALIZED ANXIETY DISORDER: Chronic | Status: ACTIVE | Noted: 2022-05-13

## 2023-10-28 PROBLEM — J40 BRONCHITIS: Status: ACTIVE | Noted: 2023-10-28

## 2023-10-28 PROBLEM — E87.29 METABOLIC ACIDOSIS, INCREASED ANION GAP: Status: RESOLVED | Noted: 2022-11-29 | Resolved: 2023-10-28

## 2023-10-28 PROBLEM — T44.7X1A: Status: RESOLVED | Noted: 2019-03-25 | Resolved: 2023-10-28

## 2023-10-28 PROBLEM — F10.932 ALCOHOL WITHDRAWAL SYNDROME WITH PERCEPTUAL DISTURBANCE: Status: RESOLVED | Noted: 2021-11-02 | Resolved: 2023-10-28

## 2023-10-28 PROBLEM — R06.09 EXERTIONAL DYSPNEA: Status: RESOLVED | Noted: 2021-11-01 | Resolved: 2023-10-28

## 2023-10-28 LAB
ALBUMIN SERPL BCP-MCNC: 4.1 G/DL (ref 3.5–5.2)
ALP SERPL-CCNC: 83 U/L (ref 55–135)
ALT SERPL W/O P-5'-P-CCNC: 23 U/L (ref 10–44)
ANION GAP SERPL CALC-SCNC: 13 MMOL/L (ref 8–16)
AST SERPL-CCNC: 32 U/L (ref 10–40)
BASOPHILS # BLD AUTO: 0.06 K/UL (ref 0–0.2)
BASOPHILS NFR BLD: 0.5 % (ref 0–1.9)
BILIRUB SERPL-MCNC: 0.8 MG/DL (ref 0.1–1)
BNP SERPL-MCNC: 226 PG/ML (ref 0–99)
BUN SERPL-MCNC: 11 MG/DL (ref 8–23)
BUN SERPL-MCNC: 14 MG/DL (ref 6–30)
CALCIUM SERPL-MCNC: 9.9 MG/DL (ref 8.7–10.5)
CHLORIDE SERPL-SCNC: 100 MMOL/L (ref 95–110)
CHLORIDE SERPL-SCNC: 101 MMOL/L (ref 95–110)
CO2 SERPL-SCNC: 22 MMOL/L (ref 23–29)
CREAT SERPL-MCNC: 1 MG/DL (ref 0.5–1.4)
CREAT SERPL-MCNC: 1.2 MG/DL (ref 0.5–1.4)
DIFFERENTIAL METHOD: ABNORMAL
EOSINOPHIL # BLD AUTO: 0.2 K/UL (ref 0–0.5)
EOSINOPHIL NFR BLD: 1.3 % (ref 0–8)
ERYTHROCYTE [DISTWIDTH] IN BLOOD BY AUTOMATED COUNT: 16.9 % (ref 11.5–14.5)
EST. GFR  (NO RACE VARIABLE): >60 ML/MIN/1.73 M^2
GLUCOSE SERPL-MCNC: 163 MG/DL (ref 70–110)
GLUCOSE SERPL-MCNC: 176 MG/DL (ref 70–110)
HCT VFR BLD AUTO: 50 % (ref 40–54)
HCT VFR BLD CALC: 50 %PCV (ref 36–54)
HGB BLD-MCNC: 16.8 G/DL (ref 14–18)
IMM GRANULOCYTES # BLD AUTO: 0.04 K/UL (ref 0–0.04)
IMM GRANULOCYTES NFR BLD AUTO: 0.4 % (ref 0–0.5)
INR PPP: 1 (ref 0.8–1.2)
LYMPHOCYTES # BLD AUTO: 3 K/UL (ref 1–4.8)
LYMPHOCYTES NFR BLD: 26.4 % (ref 18–48)
MAGNESIUM SERPL-MCNC: 1.7 MG/DL (ref 1.6–2.6)
MCH RBC QN AUTO: 28.2 PG (ref 27–31)
MCHC RBC AUTO-ENTMCNC: 33.6 G/DL (ref 32–36)
MCV RBC AUTO: 84 FL (ref 82–98)
MONOCYTES # BLD AUTO: 1 K/UL (ref 0.3–1)
MONOCYTES NFR BLD: 8.8 % (ref 4–15)
NEUTROPHILS # BLD AUTO: 7.1 K/UL (ref 1.8–7.7)
NEUTROPHILS NFR BLD: 62.6 % (ref 38–73)
NRBC BLD-RTO: 0 /100 WBC
PLATELET # BLD AUTO: 232 K/UL (ref 150–450)
PMV BLD AUTO: 8.8 FL (ref 9.2–12.9)
POC IONIZED CALCIUM: 1.03 MMOL/L (ref 1.06–1.42)
POC TCO2 (MEASURED): 26 MMOL/L (ref 23–27)
POTASSIUM BLD-SCNC: 5.3 MMOL/L (ref 3.5–5.1)
POTASSIUM SERPL-SCNC: 4.5 MMOL/L (ref 3.5–5.1)
PROT SERPL-MCNC: 7.6 G/DL (ref 6–8.4)
PROTHROMBIN TIME: 10.9 SEC (ref 9–12.5)
RBC # BLD AUTO: 5.96 M/UL (ref 4.6–6.2)
SAMPLE: ABNORMAL
SODIUM BLD-SCNC: 134 MMOL/L (ref 136–145)
SODIUM SERPL-SCNC: 135 MMOL/L (ref 136–145)
TROPONIN I SERPL DL<=0.01 NG/ML-MCNC: <0.006 NG/ML (ref 0–0.03)
TSH SERPL DL<=0.005 MIU/L-ACNC: 2.2 UIU/ML (ref 0.4–4)
WBC # BLD AUTO: 11.26 K/UL (ref 3.9–12.7)

## 2023-10-28 PROCEDURE — 80053 COMPREHEN METABOLIC PANEL: CPT | Performed by: EMERGENCY MEDICINE

## 2023-10-28 PROCEDURE — 99900035 HC TECH TIME PER 15 MIN (STAT)

## 2023-10-28 PROCEDURE — 93010 EKG 12-LEAD: ICD-10-PCS | Mod: ,,, | Performed by: INTERNAL MEDICINE

## 2023-10-28 PROCEDURE — 25000242 PHARM REV CODE 250 ALT 637 W/ HCPCS

## 2023-10-28 PROCEDURE — 82330 ASSAY OF CALCIUM: CPT

## 2023-10-28 PROCEDURE — 96376 TX/PRO/DX INJ SAME DRUG ADON: CPT

## 2023-10-28 PROCEDURE — 85610 PROTHROMBIN TIME: CPT

## 2023-10-28 PROCEDURE — G0378 HOSPITAL OBSERVATION PER HR: HCPCS

## 2023-10-28 PROCEDURE — 96365 THER/PROPH/DIAG IV INF INIT: CPT

## 2023-10-28 PROCEDURE — 96366 THER/PROPH/DIAG IV INF ADDON: CPT

## 2023-10-28 PROCEDURE — 93010 ELECTROCARDIOGRAM REPORT: CPT | Mod: ,,, | Performed by: INTERNAL MEDICINE

## 2023-10-28 PROCEDURE — 93005 ELECTROCARDIOGRAM TRACING: CPT

## 2023-10-28 PROCEDURE — 84443 ASSAY THYROID STIM HORMONE: CPT

## 2023-10-28 PROCEDURE — 25000003 PHARM REV CODE 250: Performed by: HOSPITALIST

## 2023-10-28 PROCEDURE — 85025 COMPLETE CBC W/AUTO DIFF WBC: CPT

## 2023-10-28 PROCEDURE — 84484 ASSAY OF TROPONIN QUANT: CPT

## 2023-10-28 PROCEDURE — 99291 CRITICAL CARE FIRST HOUR: CPT

## 2023-10-28 PROCEDURE — 96375 TX/PRO/DX INJ NEW DRUG ADDON: CPT

## 2023-10-28 PROCEDURE — 83880 ASSAY OF NATRIURETIC PEPTIDE: CPT

## 2023-10-28 PROCEDURE — 63600175 PHARM REV CODE 636 W HCPCS: Performed by: HOSPITALIST

## 2023-10-28 PROCEDURE — 25000003 PHARM REV CODE 250: Performed by: EMERGENCY MEDICINE

## 2023-10-28 PROCEDURE — 83735 ASSAY OF MAGNESIUM: CPT | Performed by: EMERGENCY MEDICINE

## 2023-10-28 PROCEDURE — 80047 BASIC METABLC PNL IONIZED CA: CPT

## 2023-10-28 PROCEDURE — 25000003 PHARM REV CODE 250

## 2023-10-28 PROCEDURE — 94761 N-INVAS EAR/PLS OXIMETRY MLT: CPT

## 2023-10-28 PROCEDURE — 94640 AIRWAY INHALATION TREATMENT: CPT

## 2023-10-28 RX ORDER — SERTRALINE HYDROCHLORIDE 100 MG/1
150 TABLET, FILM COATED ORAL NIGHTLY
Status: ON HOLD | COMMUNITY
Start: 2023-07-05 | End: 2023-10-29 | Stop reason: SDUPTHER

## 2023-10-28 RX ORDER — DOXYCYCLINE HYCLATE 100 MG
100 TABLET ORAL EVERY 12 HOURS
Status: DISCONTINUED | OUTPATIENT
Start: 2023-10-28 | End: 2023-10-29 | Stop reason: HOSPADM

## 2023-10-28 RX ORDER — PROCHLORPERAZINE EDISYLATE 5 MG/ML
5 INJECTION INTRAMUSCULAR; INTRAVENOUS EVERY 6 HOURS PRN
Status: DISCONTINUED | OUTPATIENT
Start: 2023-10-28 | End: 2023-10-29 | Stop reason: HOSPADM

## 2023-10-28 RX ORDER — METOPROLOL TARTRATE 50 MG/1
50 TABLET ORAL
Status: COMPLETED | OUTPATIENT
Start: 2023-10-28 | End: 2023-10-28

## 2023-10-28 RX ORDER — ALLOPURINOL 100 MG/1
200 TABLET ORAL DAILY
Status: DISCONTINUED | OUTPATIENT
Start: 2023-10-29 | End: 2023-10-29 | Stop reason: HOSPADM

## 2023-10-28 RX ORDER — METOPROLOL TARTRATE 1 MG/ML
5 INJECTION, SOLUTION INTRAVENOUS
Status: COMPLETED | OUTPATIENT
Start: 2023-10-28 | End: 2023-10-28

## 2023-10-28 RX ORDER — PANTOPRAZOLE SODIUM 40 MG/1
40 TABLET, DELAYED RELEASE ORAL
Status: DISCONTINUED | OUTPATIENT
Start: 2023-10-29 | End: 2023-10-29 | Stop reason: HOSPADM

## 2023-10-28 RX ORDER — LANOLIN ALCOHOL/MO/W.PET/CERES
1000 CREAM (GRAM) TOPICAL DAILY
Status: DISCONTINUED | OUTPATIENT
Start: 2023-10-29 | End: 2023-10-29 | Stop reason: HOSPADM

## 2023-10-28 RX ORDER — IBUPROFEN 200 MG
16 TABLET ORAL
Status: DISCONTINUED | OUTPATIENT
Start: 2023-10-28 | End: 2023-10-29 | Stop reason: HOSPADM

## 2023-10-28 RX ORDER — ONDANSETRON 2 MG/ML
4 INJECTION INTRAMUSCULAR; INTRAVENOUS EVERY 8 HOURS PRN
Status: DISCONTINUED | OUTPATIENT
Start: 2023-10-28 | End: 2023-10-29 | Stop reason: HOSPADM

## 2023-10-28 RX ORDER — DOXYLAMINE SUCCINATE 25 MG
TABLET ORAL 2 TIMES DAILY
Status: DISCONTINUED | OUTPATIENT
Start: 2023-10-28 | End: 2023-10-29 | Stop reason: HOSPADM

## 2023-10-28 RX ORDER — GLUCAGON 1 MG
1 KIT INJECTION
Status: DISCONTINUED | OUTPATIENT
Start: 2023-10-28 | End: 2023-10-29 | Stop reason: HOSPADM

## 2023-10-28 RX ORDER — MAGNESIUM SULFATE HEPTAHYDRATE 40 MG/ML
2 INJECTION, SOLUTION INTRAVENOUS
Status: DISPENSED | OUTPATIENT
Start: 2023-10-28 | End: 2023-10-29

## 2023-10-28 RX ORDER — INSULIN ASPART 100 [IU]/ML
0-5 INJECTION, SOLUTION INTRAVENOUS; SUBCUTANEOUS
Status: DISCONTINUED | OUTPATIENT
Start: 2023-10-29 | End: 2023-10-29 | Stop reason: HOSPADM

## 2023-10-28 RX ORDER — TALC
6 POWDER (GRAM) TOPICAL NIGHTLY PRN
Status: CANCELLED | OUTPATIENT
Start: 2023-10-28

## 2023-10-28 RX ORDER — SODIUM CHLORIDE 0.9 % (FLUSH) 0.9 %
10 SYRINGE (ML) INJECTION
Status: DISCONTINUED | OUTPATIENT
Start: 2023-10-28 | End: 2023-10-29 | Stop reason: HOSPADM

## 2023-10-28 RX ORDER — METOPROLOL SUCCINATE 25 MG/1
25 TABLET, EXTENDED RELEASE ORAL DAILY
Status: DISCONTINUED | OUTPATIENT
Start: 2023-10-29 | End: 2023-10-29 | Stop reason: HOSPADM

## 2023-10-28 RX ORDER — MAGNESIUM SULFATE HEPTAHYDRATE 40 MG/ML
2 INJECTION, SOLUTION INTRAVENOUS
Status: DISCONTINUED | OUTPATIENT
Start: 2023-10-29 | End: 2023-10-28

## 2023-10-28 RX ORDER — POLYETHYLENE GLYCOL 3350 17 G/17G
17 POWDER, FOR SOLUTION ORAL 2 TIMES DAILY PRN
Status: DISCONTINUED | OUTPATIENT
Start: 2023-10-28 | End: 2023-10-29 | Stop reason: HOSPADM

## 2023-10-28 RX ORDER — IPRATROPIUM BROMIDE 0.5 MG/2.5ML
SOLUTION RESPIRATORY (INHALATION)
Status: COMPLETED
Start: 2023-10-28 | End: 2023-10-28

## 2023-10-28 RX ORDER — IPRATROPIUM BROMIDE 0.5 MG/2.5ML
0.5 SOLUTION RESPIRATORY (INHALATION)
Status: DISCONTINUED | OUTPATIENT
Start: 2023-10-28 | End: 2023-10-29 | Stop reason: HOSPADM

## 2023-10-28 RX ORDER — TAMSULOSIN HYDROCHLORIDE 0.4 MG/1
0.4 CAPSULE ORAL NIGHTLY
Status: DISCONTINUED | OUTPATIENT
Start: 2023-10-28 | End: 2023-10-29 | Stop reason: HOSPADM

## 2023-10-28 RX ORDER — NALTREXONE HYDROCHLORIDE 50 MG/1
50 TABLET, FILM COATED ORAL DAILY
Status: DISCONTINUED | OUTPATIENT
Start: 2023-10-29 | End: 2023-10-29 | Stop reason: HOSPADM

## 2023-10-28 RX ORDER — DICLOFENAC SODIUM 10 MG/G
2 GEL TOPICAL 3 TIMES DAILY PRN
Status: DISCONTINUED | OUTPATIENT
Start: 2023-10-28 | End: 2023-10-29 | Stop reason: HOSPADM

## 2023-10-28 RX ORDER — FOLIC ACID 1 MG/1
1 TABLET ORAL DAILY
Status: DISCONTINUED | OUTPATIENT
Start: 2023-10-29 | End: 2023-10-29 | Stop reason: HOSPADM

## 2023-10-28 RX ORDER — ATORVASTATIN CALCIUM 40 MG/1
80 TABLET, FILM COATED ORAL NIGHTLY
Status: DISCONTINUED | OUTPATIENT
Start: 2023-10-28 | End: 2023-10-29 | Stop reason: HOSPADM

## 2023-10-28 RX ORDER — FLECAINIDE ACETATE 100 MG/1
100 TABLET ORAL EVERY 12 HOURS
Status: DISCONTINUED | OUTPATIENT
Start: 2023-10-28 | End: 2023-10-29

## 2023-10-28 RX ORDER — LORAZEPAM 1 MG/1
1 TABLET ORAL EVERY 4 HOURS PRN
Status: DISCONTINUED | OUTPATIENT
Start: 2023-10-28 | End: 2023-10-29 | Stop reason: HOSPADM

## 2023-10-28 RX ORDER — NALOXONE HCL 0.4 MG/ML
0.02 VIAL (ML) INJECTION
Status: DISCONTINUED | OUTPATIENT
Start: 2023-10-28 | End: 2023-10-29 | Stop reason: HOSPADM

## 2023-10-28 RX ORDER — TALC
6 POWDER (GRAM) TOPICAL NIGHTLY PRN
Status: DISCONTINUED | OUTPATIENT
Start: 2023-10-28 | End: 2023-10-29 | Stop reason: HOSPADM

## 2023-10-28 RX ORDER — SODIUM CHLORIDE 0.9 % (FLUSH) 0.9 %
10 SYRINGE (ML) INJECTION EVERY 6 HOURS PRN
Status: DISCONTINUED | OUTPATIENT
Start: 2023-10-28 | End: 2023-10-29 | Stop reason: HOSPADM

## 2023-10-28 RX ORDER — THIAMINE HCL 100 MG
100 TABLET ORAL DAILY
Status: DISCONTINUED | OUTPATIENT
Start: 2023-10-29 | End: 2023-10-29 | Stop reason: HOSPADM

## 2023-10-28 RX ORDER — OMEGA-3-ACID ETHYL ESTERS 1 G/1
2 CAPSULE, LIQUID FILLED ORAL 2 TIMES DAILY
Status: DISCONTINUED | OUTPATIENT
Start: 2023-10-28 | End: 2023-10-29 | Stop reason: HOSPADM

## 2023-10-28 RX ORDER — LANOLIN ALCOHOL/MO/W.PET/CERES
400 CREAM (GRAM) TOPICAL DAILY
Status: DISCONTINUED | OUTPATIENT
Start: 2023-10-29 | End: 2023-10-29 | Stop reason: HOSPADM

## 2023-10-28 RX ORDER — IBUPROFEN 200 MG
24 TABLET ORAL
Status: DISCONTINUED | OUTPATIENT
Start: 2023-10-28 | End: 2023-10-29 | Stop reason: HOSPADM

## 2023-10-28 RX ORDER — ACETAMINOPHEN 325 MG/1
650 TABLET ORAL EVERY 4 HOURS PRN
Status: DISCONTINUED | OUTPATIENT
Start: 2023-10-28 | End: 2023-10-29 | Stop reason: HOSPADM

## 2023-10-28 RX ORDER — ROPINIROLE 1 MG/1
1 TABLET, FILM COATED ORAL NIGHTLY
Status: DISCONTINUED | OUTPATIENT
Start: 2023-10-28 | End: 2023-10-29 | Stop reason: HOSPADM

## 2023-10-28 RX ORDER — SERTRALINE HYDROCHLORIDE 50 MG/1
150 TABLET, FILM COATED ORAL EVERY MORNING
Status: DISCONTINUED | OUTPATIENT
Start: 2023-10-29 | End: 2023-10-28

## 2023-10-28 RX ADMIN — OMEGA-3-ACID ETHYL ESTERS 2 G: 1 CAPSULE, LIQUID FILLED ORAL at 09:10

## 2023-10-28 RX ADMIN — MICONAZOLE NITRATE: 20 CREAM TOPICAL at 10:10

## 2023-10-28 RX ADMIN — LORAZEPAM 1 MG: 1 TABLET ORAL at 09:10

## 2023-10-28 RX ADMIN — Medication 6 MG: at 09:10

## 2023-10-28 RX ADMIN — APIXABAN 5 MG: 5 TABLET, FILM COATED ORAL at 09:10

## 2023-10-28 RX ADMIN — ATORVASTATIN CALCIUM 80 MG: 40 TABLET, FILM COATED ORAL at 09:10

## 2023-10-28 RX ADMIN — SERTRALINE HYDROCHLORIDE 150 MG: 100 TABLET ORAL at 10:10

## 2023-10-28 RX ADMIN — DOXYCYCLINE HYCLATE 100 MG: 100 TABLET, COATED ORAL at 09:10

## 2023-10-28 RX ADMIN — MAGNESIUM SULFATE HEPTAHYDRATE 2 G: 40 INJECTION, SOLUTION INTRAVENOUS at 10:10

## 2023-10-28 RX ADMIN — METOPROLOL TARTRATE 50 MG: 50 TABLET, FILM COATED ORAL at 06:10

## 2023-10-28 RX ADMIN — METOROPROLOL TARTRATE 5 MG: 5 INJECTION, SOLUTION INTRAVENOUS at 03:10

## 2023-10-28 RX ADMIN — ROPINIROLE HYDROCHLORIDE 1 MG: 1 TABLET, FILM COATED ORAL at 10:10

## 2023-10-28 RX ADMIN — TAMSULOSIN HYDROCHLORIDE 0.4 MG: 0.4 CAPSULE ORAL at 09:10

## 2023-10-28 RX ADMIN — ACETAMINOPHEN 650 MG: 325 TABLET ORAL at 10:10

## 2023-10-28 RX ADMIN — FLECAINIDE ACETATE 100 MG: 100 TABLET ORAL at 09:10

## 2023-10-28 RX ADMIN — METOROPROLOL TARTRATE 5 MG: 5 INJECTION, SOLUTION INTRAVENOUS at 05:10

## 2023-10-28 RX ADMIN — IPRATROPIUM BROMIDE 0.5 MG: 0.5 SOLUTION RESPIRATORY (INHALATION) at 09:10

## 2023-10-28 RX ADMIN — METOROPROLOL TARTRATE 5 MG: 5 INJECTION, SOLUTION INTRAVENOUS at 04:10

## 2023-10-28 NOTE — ED TRIAGE NOTES
Patient came in complaining of progressive weakness for the past week. The patient is also endorsing SOB, with a productive cough with yellow/green sputum. The patient has a history of aFIB. The patient upon arrival had a heart rate of 150bpm.

## 2023-10-28 NOTE — PROVIDER PROGRESS NOTES - EMERGENCY DEPT.
Encounter Date: 10/28/2023    ED Physician Progress Notes         EKG - STEMI Decision  Initial Reading: No STEMI present (RVR in the 160s, patient to be in main ED on monitor.).

## 2023-10-28 NOTE — ED PROVIDER NOTES
"Encounter Date: 10/28/2023       History     Chief Complaint   Patient presents with    Shortness of Breath     Hx afib, cough     HPI    Donald Warren Abadie is a 68 y.o. M w/ a PMHx of afib on Eliquis, HF (last EF 45-50%), and DM who presents to the ED w/ SOB and cough.  Patient states that his cough started aprox.  2 weeks ago and was initially productive of clear sputum, but he is now been having yellow/green sputum production. He states that over this time he is had progressively worsening SOB and now was unable to walk 50 ft w/o having to stop to catch his breath.  Patient states that he is also become lightheaded a few times during this period.  He denies f/c, HA, syncope, vision changes, confusion, CP, abdominal pain, N/V/D, lower extremity edema/erythema.  Of note, patient does have known history of EtOH abuse.  He states that he is not been on a recent episode of binge drinking, but there is some concern by family that he has been drinking more than stated.  He is not appear acutely intoxicated the ED or having signs of EtOH withdrawal.      Review of patient's allergies indicates:   Allergen Reactions    No known drug allergies      Past Medical History:   Diagnosis Date    A-fib     Alcohol abuse     Anxiety     Arthritis     Chronic gout     Diabetes mellitus     DM (diabetes mellitus) 11/16/2016    "boarderline, not taking any meds currently"    Fatty liver     Hyperlipidemia     Renal cell cancer 2014    S/P TKR (total knee replacement), right 6/27/2019     Past Surgical History:   Procedure Laterality Date    ABSCESS DRAINAGE      perirectal    COLONOSCOPY      HAND SURGERY Left     JOINT REPLACEMENT      knee replacement right knee    KIDNEY SURGERY      partial left kidney removal - [place]    PARTIAL NEPHRECTOMY Left August 2014    PERCUTANEOUS CRYOTHERAPY OF PERIPHERAL NERVE USING LIQUID NITROUS OXIDE IN CLOSED NEEDLE DEVICE Right 6/17/2019    Procedure: CRYOTHERAPY, NERVE, PERIPHERAL, PERCUTANEOUS, " USING LIQUID NITROUS OXIDE IN CLOSED NEEDLE DEVICE-right knee iovera;  Surgeon: Donny Hair III, MD;  Location: [facility] CATH LAB;  Service: Pain Management;  Laterality: Right;    TOTAL KNEE ARTHROPLASTY Right 6/27/2019    Procedure: ARTHROPLASTY, KNEE, TOTAL-SAME DAY;  Surgeon: Mikael Huerta MD;  Location: [facility] OR [address] [place] [address];  Service: Orthopedics;  Laterality: Right;     Family History   Problem Relation Age of Onset    Lung cancer Father     Colon cancer Father     Cancer Father         lung cancer    Diabetes Mother     Alzheimer's disease Mother     Lung cancer Sister     Cancer Sister         lung    Colon polyps Brother     Alcohol abuse Brother     Alzheimer's disease Brother     Cirrhosis Neg Hx      Social History     Tobacco Use    Smoking status: Never    Smokeless tobacco: Never   Substance Use Topics    Alcohol use: Yes     Alcohol/week: 84.0 standard drinks of alcohol     Types: 84 Cans of beer per week     Comment: 12 beers daily    Drug use: No     Review of Systems  See HPI  Physical Exam     Initial Vitals [10/28/23 1511]   BP Pulse Resp Temp SpO2   116/72 (!) 145 (!) 24 97.9 °F (36.6 °C) 97 %      MAP       --         Physical Exam    Nursing note and vitals reviewed.  Constitutional: He appears well-developed and well-nourished. He is diaphoretic. No distress.   HENT:   Head: Normocephalic and atraumatic.   Nose: Nose normal.   Eyes: Conjunctivae and EOM are normal.   Neck: No JVD present.   Normal range of motion.  Cardiovascular:            AFib w/ RVR.  Tachycardic w/ rate in 140s.  Distal pulses intact and equal bilaterally.   Pulmonary/Chest: Breath sounds normal. No respiratory distress. He exhibits no tenderness.   Abdominal: Abdomen is soft. Bowel sounds are normal. He exhibits no distension. There is no abdominal tenderness.   Musculoskeletal:         General: No tenderness or edema. Normal range of motion.      Cervical back: Normal range of motion.     Neurological: He is  alert and oriented to person, place, and time. He has normal strength. No sensory deficit.   Skin: Skin is warm. Capillary refill takes less than 2 seconds. No rash noted. No pallor.   Psychiatric: He has a normal mood and affect. His behavior is normal. Judgment and thought content normal.         ED Course   Procedures  Labs Reviewed   CBC W/ AUTO DIFFERENTIAL - Abnormal; Notable for the following components:       Result Value    RDW 16.9 (*)     MPV 8.8 (*)     All other components within normal limits   B-TYPE NATRIURETIC PEPTIDE - Abnormal; Notable for the following components:     (*)     All other components within normal limits   COMPREHENSIVE METABOLIC PANEL - Abnormal; Notable for the following components:    Sodium 135 (*)     CO2 22 (*)     Glucose 163 (*)     All other components within normal limits   ISTAT PROCEDURE - Abnormal; Notable for the following components:    POC Glucose 176 (*)     POC Sodium 134 (*)     POC Potassium 5.3 (*)     POC Ionized Calcium 1.03 (*)     All other components within normal limits   PROTIME-INR   TSH   TROPONIN I   MAGNESIUM   MAGNESIUM    Narrative:     ADD ON MAGNESIUM PER DR RAY MORALES/ORDER# [id number] @ 19:31   URINALYSIS, REFLEX TO URINE CULTURE   ISTAT CHEM8          Imaging Results              X-Ray Chest AP Portable (Final result)  Result time 10/28/23 17:06:32      Final result by Vinny Mcneal MD (10/28/23 17:06:32)                   Impression:      1. No acute cardiopulmonary process.      Electronically signed by: Vinny Mcneal MD  Date:    10/28/2023  Time:    17:06               Narrative:    EXAMINATION:  XR CHEST AP PORTABLE    CLINICAL HISTORY:  SOB;    TECHNIQUE:  Single frontal view of the chest was performed.    COMPARISON:  11/29/2022    FINDINGS:  The cardiomediastinal silhouette is not enlarged.  There is no pleural effusion.  The trachea is midline.  The lungs are symmetrically expanded bilaterally with mildly coarse  interstitial attenuation, accentuated by shallow inspiratory effort..  No large focal consolidation seen.  There is no pneumothorax.  The osseous structures are remarkable for degenerative change..                                       Medications   sodium chloride 0.9% flush 10 mL (has no administration in time range)   allopurinoL tablet 200 mg (has no administration in time range)   cyanocobalamin tablet 1,000 mcg (has no administration in time range)   diclofenac sodium 1 % gel 2 g (has no administration in time range)   apixaban tablet 5 mg (5 mg Oral Given 10/28/23 2158)   flecainide tablet 100 mg (100 mg Oral Given 10/28/23 2158)   folic acid tablet 1 mg (has no administration in time range)   miconazole 2 % cream ( Topical (Top) Given 10/28/23 2215)   magnesium oxide tablet 400 mg (has no administration in time range)   metoprolol succinate (TOPROL-XL) 24 hr tablet 25 mg (has no administration in time range)   multivitamin tablet (has no administration in time range)   naltrexone tablet 50 mg (has no administration in time range)   omega-3 acid ethyl esters capsule 2 g (2 g Oral Given 10/28/23 2158)   pantoprazole EC tablet 40 mg (has no administration in time range)   rOPINIRole tablet 1 mg (1 mg Oral Given 10/28/23 2215)   atorvastatin tablet 80 mg (80 mg Oral Given 10/28/23 2157)   tamsulosin 24 hr capsule 0.4 mg (0.4 mg Oral Given 10/28/23 2158)   thiamine tablet 100 mg (has no administration in time range)   sodium chloride 0.9% flush 10 mL (has no administration in time range)   melatonin tablet 6 mg (6 mg Oral Given 10/28/23 2158)   polyethylene glycol packet 17 g (has no administration in time range)   acetaminophen tablet 650 mg (650 mg Oral Given 10/28/23 2215)   naloxone 0.4 mg/mL injection 0.02 mg (has no administration in time range)   ondansetron injection 4 mg (has no administration in time range)   prochlorperazine injection Soln 5 mg (has no administration in time range)   ipratropium 0.02 %  nebulizer solution 0.5 mg (0 mg Nebulization Hold 10/28/23 2140)   LORazepam tablet 1 mg (1 mg Oral Given 10/28/23 2158)   glucose chewable tablet 16 g (has no administration in time range)   glucose chewable tablet 24 g (has no administration in time range)   glucagon (human recombinant) injection 1 mg (has no administration in time range)   insulin aspart U-100 pen 0-5 Units (has no administration in time range)   dextrose 10% bolus 125 mL 125 mL (has no administration in time range)   dextrose 10% bolus 250 mL 250 mL (has no administration in time range)   magnesium sulfate 2g in water 50mL IVPB (premix) (2 g Intravenous New Bag 10/28/23 2201)   doxycycline tablet 100 mg (100 mg Oral Given 10/28/23 2158)   sertraline tablet 150 mg (150 mg Oral Given 10/28/23 2215)   metoprolol injection 5 mg (5 mg Intravenous Given 10/28/23 1558)   metoprolol injection 5 mg (5 mg Intravenous Given 10/28/23 1626)   metoprolol injection 5 mg (5 mg Intravenous Given 10/28/23 1739)   metoprolol tartrate (LOPRESSOR) tablet 50 mg (50 mg Oral Given 10/28/23 1806)   ipratropium (ATROVENT) 0.02 % nebulizer solution (0.5 mg  Given 10/28/23 2140)     Medical Decision Making  Patient is a 68-year-old male w/ a PMHx of AFib on Eliquis, heart failure and diabetes who presented to the ED w/SOB and cough.  Initial EKG showed  Afib RVR w/o signs of ACS.  Patient was tachycardic in 140's, but otherwise vitals stable.  Initial laboratory work showed no electrolyte or metabolic derangements, decreasing concern for this leading to patient's arrhythmia.  CBC w/o leukocytosis and CXR WNL, decreasing concern for acute infectious pathology resulting in patient's acute arrhythmia.  CXR and troponin WNL also decreasing concern for other intrathoracic pathology or ACS.  TSH WNL, decreasing concern for endocrine abnormalities resulting in patient's arrhythmia.  Given patient's known history of EtOH abuse, we did consider this as a potential precipitating  factor for patient's arrhythmia, but he showed no acute signs of intoxication or withdrawal and denied recent binge drinking episodes, decreasing concern for this resulting in patient's arrhythmia.  BNP was elevated at 226 which is consistent w/ his known heart failure, but there is low concern for heart failure exacerbation due to the lack of volume overload on exam.  ED interventions included metoprolol 5 mg IV x3 over his ED course.  Metoprolol temporarily improved patient's heart rate, but it quickly returned to greater than 110 beats per minute.  At this time, patient's most likely diagnosis is AFib w/ RVR of unknown etiology; he is known to have this arrhythmia and has no known etiology based on previous studies either.  Patient was admitted to observation for further workup and management of his arrhythmia.  This plan and test results were discussed w/ the patient, who expressed understanding and agreement.    Please see HPI, physical exam, and ED course for additional details.     Amount and/or Complexity of Data Reviewed  External Data Reviewed: labs, radiology, ECG and notes.  Labs: ordered. Decision-making details documented in ED Course.  Radiology: ordered. Decision-making details documented in ED Course.  ECG/medicine tests: ordered. Decision-making details documented in ED Course.    Risk  Prescription drug management.                 ED Course as of 10/28/23 2359   Sat Oct 28, 2023   1522 EKG 12-lead  Afib RVR in triage. Known history of afib.    No STEMI or T wave inversion. Decreased concern for ACS; patient also w/o CP. [RN]   1523 Pulse(!): 145  Tachycardic and otherwise vitally stable. [RN]   1558 Metoprolol 5mg IV given for rate control [RN]   1614 Pulse(!): 125  Improved rate s/p metoprolol [RN]   1618 Second dose of metoprolol given for rate control.  Goal heart rate less than 110. [RN]   1625 BP(!): 151/74  No hypotension w/ metoprolol  [RN]   1627 CBC auto differential(!)  No leukocytosis,  decreased concern for acute infectious process at this time despite respiratory symptoms.    No anemia, low concern for symptomatic anemia resulting tachycardia. [RN]   1637 Protime-INR  WNL. [RN]   1645 BNP(!): 226  Elevated. Consistent w/ known HF. Decreased compared to previous measurement.     Despite elevated value, low concern for heart failure exacerbation due to lack of evidence for volume overload including lower extremity edema, pulmonary findings, no JVD. [RN]   1716 Troponin I: <0.006  WNL.  Significantly decreased concern for ACS at this time. [RN]   1716 TSH: 2.199  WNL.  Decreased concern for endocrine abnormality leading to AFib. [RN]   1717 3rd metoprolol 5 mg ordered w/ goal of heart rate less than 110.  [RN]   1717 Pulse: 107  Heart rate improvement w/ metoprolol dosing. [RN]   1718 On re-evaluation, patient is resting comfortably w/no signs of hypotension.  Denies any additional symptoms or worsening of SOB. [RN]   1719 X-Ray Chest AP Portable  No signs of intrathoracic abnormality including infection, effusion, edema. [RN]   1839 Comprehensive metabolic panel(!)  Initial CMP hemolyzed, repeat CMP showed no acute electrolyte derangements.  This decreases concern for metabolic/electrolyte abnormalities leading to AFib. [RN]   1900 Despite 3rd dose of metoprolol, patient's heart rate remained in 120's.  Patient was admitted to observation for further workup and management of persistent AFib despite rate control interventions. [RN]      ED Course User Index  [RN] Best Jurado MD                    Clinical Impression:   Final diagnoses:  [R06.02] SOB (shortness of breath)  [I48.91] Atrial fibrillation, unspecified type (Primary)        ED Disposition Condition    Observation Stable                Best Jurado MD  Resident  10/29/23 0009

## 2023-10-28 NOTE — ED NOTES
I-STAT Chem-8+ Results:   Value Reference Range   Sodium 134 136-145 mmol/L   Potassium  5.3 3.5-5.1 mmol/L   Chloride 101  mmol/L   Ionized Calcium 1.03 1.06-1.42 mmol/L   CO2 (measured) 26 23-29 mmol/L   Glucose 176  mg/dL   BUN 14 6-30 mg/dL   Creatinine 1.0 0.5-1.4 mg/dL   Hematocrit 50 36-54%

## 2023-10-29 VITALS
WEIGHT: 185.19 LBS | TEMPERATURE: 98 F | BODY MASS INDEX: 29.76 KG/M2 | OXYGEN SATURATION: 98 % | HEIGHT: 66 IN | HEART RATE: 68 BPM | SYSTOLIC BLOOD PRESSURE: 127 MMHG | RESPIRATION RATE: 18 BRPM | DIASTOLIC BLOOD PRESSURE: 75 MMHG

## 2023-10-29 LAB
ALBUMIN SERPL BCP-MCNC: 3.9 G/DL (ref 3.5–5.2)
ANION GAP SERPL CALC-SCNC: 14 MMOL/L (ref 8–16)
BASOPHILS # BLD AUTO: 0.05 K/UL (ref 0–0.2)
BASOPHILS NFR BLD: 0.6 % (ref 0–1.9)
BUN SERPL-MCNC: 15 MG/DL (ref 8–23)
CALCIUM SERPL-MCNC: 9.5 MG/DL (ref 8.7–10.5)
CHLORIDE SERPL-SCNC: 98 MMOL/L (ref 95–110)
CO2 SERPL-SCNC: 20 MMOL/L (ref 23–29)
CREAT SERPL-MCNC: 1.2 MG/DL (ref 0.5–1.4)
DIFFERENTIAL METHOD: ABNORMAL
EOSINOPHIL # BLD AUTO: 0.2 K/UL (ref 0–0.5)
EOSINOPHIL NFR BLD: 2.1 % (ref 0–8)
ERYTHROCYTE [DISTWIDTH] IN BLOOD BY AUTOMATED COUNT: 16.1 % (ref 11.5–14.5)
EST. GFR  (NO RACE VARIABLE): >60 ML/MIN/1.73 M^2
ESTIMATED AVG GLUCOSE: 183 MG/DL (ref 68–131)
GLUCOSE SERPL-MCNC: 171 MG/DL (ref 70–110)
HBA1C MFR BLD: 8 % (ref 4–5.6)
HCT VFR BLD AUTO: 41.9 % (ref 40–54)
HGB BLD-MCNC: 13.8 G/DL (ref 14–18)
IMM GRANULOCYTES # BLD AUTO: 0.04 K/UL (ref 0–0.04)
IMM GRANULOCYTES NFR BLD AUTO: 0.4 % (ref 0–0.5)
INFLUENZA A, MOLECULAR: NOT DETECTED
INFLUENZA B, MOLECULAR: NOT DETECTED
LYMPHOCYTES # BLD AUTO: 2.2 K/UL (ref 1–4.8)
LYMPHOCYTES NFR BLD: 24.6 % (ref 18–48)
MAGNESIUM SERPL-MCNC: 1.9 MG/DL (ref 1.6–2.6)
MCH RBC QN AUTO: 28.1 PG (ref 27–31)
MCHC RBC AUTO-ENTMCNC: 32.9 G/DL (ref 32–36)
MCV RBC AUTO: 85 FL (ref 82–98)
MONOCYTES # BLD AUTO: 0.8 K/UL (ref 0.3–1)
MONOCYTES NFR BLD: 8.9 % (ref 4–15)
NEUTROPHILS # BLD AUTO: 5.7 K/UL (ref 1.8–7.7)
NEUTROPHILS NFR BLD: 63.4 % (ref 38–73)
NRBC BLD-RTO: 0 /100 WBC
PHOSPHATE SERPL-MCNC: 3.5 MG/DL (ref 2.7–4.5)
PLATELET # BLD AUTO: 184 K/UL (ref 150–450)
PMV BLD AUTO: 8.8 FL (ref 9.2–12.9)
POCT GLUCOSE: 171 MG/DL (ref 70–110)
POTASSIUM SERPL-SCNC: 3.8 MMOL/L (ref 3.5–5.1)
RBC # BLD AUTO: 4.91 M/UL (ref 4.6–6.2)
RSV AG BY MOLECULAR METHOD: NOT DETECTED
SARS-COV-2 RNA RESP QL NAA+PROBE: NOT DETECTED
SODIUM SERPL-SCNC: 132 MMOL/L (ref 136–145)
T4 FREE SERPL-MCNC: 0.84 NG/DL (ref 0.71–1.51)
TSH SERPL DL<=0.005 MIU/L-ACNC: 2.09 UIU/ML (ref 0.4–4)
WBC # BLD AUTO: 9.03 K/UL (ref 3.9–12.7)

## 2023-10-29 PROCEDURE — 0241U SARS-COV2 (COVID) WITH FLU/RSV BY PCR: CPT | Performed by: STUDENT IN AN ORGANIZED HEALTH CARE EDUCATION/TRAINING PROGRAM

## 2023-10-29 PROCEDURE — 84439 ASSAY OF FREE THYROXINE: CPT | Performed by: HOSPITALIST

## 2023-10-29 PROCEDURE — 25000003 PHARM REV CODE 250: Performed by: STUDENT IN AN ORGANIZED HEALTH CARE EDUCATION/TRAINING PROGRAM

## 2023-10-29 PROCEDURE — 99213 PR OFFICE/OUTPT VISIT, EST, LEVL III, 20-29 MIN: ICD-10-PCS | Mod: GC,,, | Performed by: INTERNAL MEDICINE

## 2023-10-29 PROCEDURE — 83036 HEMOGLOBIN GLYCOSYLATED A1C: CPT | Performed by: HOSPITALIST

## 2023-10-29 PROCEDURE — 83735 ASSAY OF MAGNESIUM: CPT | Performed by: HOSPITALIST

## 2023-10-29 PROCEDURE — 25000003 PHARM REV CODE 250: Performed by: HOSPITALIST

## 2023-10-29 PROCEDURE — 36415 COLL VENOUS BLD VENIPUNCTURE: CPT | Performed by: HOSPITALIST

## 2023-10-29 PROCEDURE — 99213 OFFICE O/P EST LOW 20 MIN: CPT | Mod: GC,,, | Performed by: INTERNAL MEDICINE

## 2023-10-29 PROCEDURE — 85025 COMPLETE CBC W/AUTO DIFF WBC: CPT | Performed by: HOSPITALIST

## 2023-10-29 PROCEDURE — 25000242 PHARM REV CODE 250 ALT 637 W/ HCPCS: Performed by: HOSPITALIST

## 2023-10-29 PROCEDURE — G0378 HOSPITAL OBSERVATION PER HR: HCPCS

## 2023-10-29 PROCEDURE — 94640 AIRWAY INHALATION TREATMENT: CPT | Mod: XB

## 2023-10-29 PROCEDURE — 84443 ASSAY THYROID STIM HORMONE: CPT | Performed by: HOSPITALIST

## 2023-10-29 PROCEDURE — 80069 RENAL FUNCTION PANEL: CPT | Performed by: HOSPITALIST

## 2023-10-29 RX ORDER — ALPRAZOLAM 0.25 MG/1
0.25 TABLET ORAL NIGHTLY PRN
Qty: 30 TABLET | Refills: 0 | Status: SHIPPED | OUTPATIENT
Start: 2023-10-29 | End: 2023-11-17

## 2023-10-29 RX ORDER — METOPROLOL SUCCINATE 25 MG/1
25 TABLET, EXTENDED RELEASE ORAL DAILY
Qty: 90 TABLET | Refills: 3 | Status: SHIPPED | OUTPATIENT
Start: 2023-10-29 | End: 2024-10-28

## 2023-10-29 RX ORDER — SERTRALINE HYDROCHLORIDE 100 MG/1
TABLET, FILM COATED ORAL
Start: 2023-10-29 | End: 2023-11-17

## 2023-10-29 RX ORDER — DICLOFENAC SODIUM 10 MG/G
2 GEL TOPICAL 3 TIMES DAILY PRN
Start: 2023-10-29

## 2023-10-29 RX ORDER — FLECAINIDE ACETATE 100 MG/1
100 TABLET ORAL EVERY 12 HOURS
Status: COMPLETED | OUTPATIENT
Start: 2023-10-29 | End: 2023-10-29

## 2023-10-29 RX ORDER — DOXYCYCLINE HYCLATE 100 MG
100 TABLET ORAL EVERY 12 HOURS
Qty: 10 TABLET | Refills: 0 | Status: SHIPPED | OUTPATIENT
Start: 2023-10-29 | End: 2023-11-03

## 2023-10-29 RX ORDER — POTASSIUM CHLORIDE 20 MEQ/1
20 TABLET, EXTENDED RELEASE ORAL ONCE
Status: COMPLETED | OUTPATIENT
Start: 2023-10-29 | End: 2023-10-29

## 2023-10-29 RX ORDER — ALPRAZOLAM 0.5 MG/1
0.5 TABLET ORAL NIGHTLY PRN
Qty: 30 TABLET | Refills: 0 | Status: SHIPPED | OUTPATIENT
Start: 2023-10-29 | End: 2023-10-29 | Stop reason: SDUPTHER

## 2023-10-29 RX ADMIN — POTASSIUM CHLORIDE 20 MEQ: 1500 TABLET, EXTENDED RELEASE ORAL at 09:10

## 2023-10-29 RX ADMIN — METOPROLOL SUCCINATE 25 MG: 25 TABLET, EXTENDED RELEASE ORAL at 08:10

## 2023-10-29 RX ADMIN — THERA TABS 1 TABLET: TAB at 08:10

## 2023-10-29 RX ADMIN — OMEGA-3-ACID ETHYL ESTERS 2 G: 1 CAPSULE, LIQUID FILLED ORAL at 08:10

## 2023-10-29 RX ADMIN — FLECAINIDE ACETATE 100 MG: 100 TABLET ORAL at 08:10

## 2023-10-29 RX ADMIN — FOLIC ACID 1 MG: 1 TABLET ORAL at 08:10

## 2023-10-29 RX ADMIN — CYANOCOBALAMIN TAB 1000 MCG 1000 MCG: 1000 TAB at 08:10

## 2023-10-29 RX ADMIN — IPRATROPIUM BROMIDE 0.5 MG: 0.5 SOLUTION RESPIRATORY (INHALATION) at 07:10

## 2023-10-29 RX ADMIN — ALLOPURINOL 200 MG: 100 TABLET ORAL at 08:10

## 2023-10-29 RX ADMIN — DOXYCYCLINE HYCLATE 100 MG: 100 TABLET, COATED ORAL at 08:10

## 2023-10-29 RX ADMIN — Medication 400 MG: at 08:10

## 2023-10-29 RX ADMIN — MICONAZOLE NITRATE: 20 CREAM TOPICAL at 08:10

## 2023-10-29 RX ADMIN — PANTOPRAZOLE SODIUM 40 MG: 40 TABLET, DELAYED RELEASE ORAL at 06:10

## 2023-10-29 RX ADMIN — NALTREXONE HYDROCHLORIDE 50 MG: 50 TABLET, FILM COATED ORAL at 08:10

## 2023-10-29 RX ADMIN — Medication 100 MG: at 08:10

## 2023-10-29 RX ADMIN — APIXABAN 5 MG: 5 TABLET, FILM COATED ORAL at 08:10

## 2023-10-29 NOTE — H&P
[facility] - Cardiology [facility] Medicine  History & Physical    Patient Name: Donald Warren Abadie  MRN: [id number]  Patient Class: OP- Observation  Admission Date: 10/28/2023  Attending Physician: Dayna Bai MD [facility] HOSP MED C  Admitting Physician: Garrick Kramer MD  Primary Care Provider: Bacilio Larry MD         Patient information was obtained from patient, past medical records and ER records.     Subjective:     Principal Problem:Atrial fibrillation with RVR    Chief Complaint:   Chief Complaint   Patient presents with    Shortness of Breath     Hx afib, cough        HPI: 69 y/o WM hx of HFpEF (last ef 45-50%) Longstanding persistent Atrial Fibrillation, Alcohol dependence, hx of RCC presents to the ED with complaints of dyspnea and cough.     He has reported a 2 weeks history of cough initially productive w/ clear sputum, now changes to green sputum.  He has associated symptoms of dyspnea and dyspnea on exertion.  Reduced exercise tolerance, reporting difficulty walking more than 50 feet without stopping to catch his breath. He denies any sinus pain, no fevers or chills, some sore throat but resolving, cough symptom is persistent.     In the ED found to have atrial fibrillation with RVR.  He has received 3 doses 5mg IV metoprolol and IR Lopressor 50mg at this time.  His prior cardiology notes comment on concern for continued alcohol use and uncontrolled atrial fibrillation, he has been prescribed Flecainide pill-in-pocket PRN use.  He has not used FLecainide in 2 days.  He states its been weeks sine he's taken Toprol XL.  He reports being able to tell when he is in Afib by measuring pulse or can feel irregular beats. Reports adherence to apixaban therapy     Regarding alcohol dependence, attended inpatient rehab program for 3 weeks in April, states that he's been following with outpatient rehab services/program but he has had a relapse of alcohol use 2 weeks ago.  He reports that his PRN xanax  "had run out and resulted in him drinking alcohol again.  He has been drinking beer approximately every other day, last use was 3 days ago - drank 6-8 12oz cans of beer.  He feels if he can have xanax back he won't have to drink alcohol.  Naltrexone is on med list but states he has it but never has used.  Reports adheren    ED Treatment - IV metoprolol 5mg x 3,  PO Metoprolol 50mg       Past Medical History:   Diagnosis Date    A-fib     Alcohol abuse     Anxiety     Arthritis     Chronic gout     Diabetes mellitus     DM (diabetes mellitus) 11/16/2016    "boarderline, not taking any meds currently"    Fatty liver     Hyperlipidemia     Renal cell cancer 2014    S/P TKR (total knee replacement), right 6/27/2019       Past Surgical History:   Procedure Laterality Date    ABSCESS DRAINAGE      perirectal    COLONOSCOPY      HAND SURGERY Left     JOINT REPLACEMENT      knee replacement right knee    KIDNEY SURGERY      partial left kidney removal - [place]    PARTIAL NEPHRECTOMY Left August 2014    PERCUTANEOUS CRYOTHERAPY OF PERIPHERAL NERVE USING LIQUID NITROUS OXIDE IN CLOSED NEEDLE DEVICE Right 6/17/2019    Procedure: CRYOTHERAPY, NERVE, PERIPHERAL, PERCUTANEOUS, USING LIQUID NITROUS OXIDE IN CLOSED NEEDLE DEVICE-right knee iovera;  Surgeon: Donny Hair III, MD;  Location: [facility] CATH LAB;  Service: Pain Management;  Laterality: Right;    TOTAL KNEE ARTHROPLASTY Right 6/27/2019    Procedure: ARTHROPLASTY, KNEE, TOTAL-SAME DAY;  Surgeon: Mikael Huerta MD;  Location: [facility] OR [address] [place] [address];  Service: Orthopedics;  Laterality: Right;       Review of patient's allergies indicates:   Allergen Reactions    No known drug allergies        No current facility-administered medications on file prior to encounter.     Current Outpatient Medications on File Prior to Encounter   Medication Sig    allopurinoL (ZYLOPRIM) 100 MG tablet Take 2 tablets (200 mg total) by mouth once daily.    ALPRAZolam (XANAX) " 0.5 MG tablet TAKE 1 TABLET(0.5 MG) BY MOUTH EVERY NIGHT AS NEEDED FOR ANXIETY    cyanocobalamin (VITAMIN B-12) 1000 MCG tablet Take 1 tablet (1,000 mcg total) by mouth once daily.    diclofenac sodium (VOLTAREN) 1 % Gel Apply 2 g topically once daily. (Patient taking differently: Apply 2 g topically 3 (three) times daily as needed (Right knee - hand pain).)    ELIQUIS 5 mg Tab TAKE 1 TABLET BY MOUTH TWICE DAILY (Patient taking differently: Take 5 mg by mouth 2 (two) times daily.)    flecainide (TAMBOCOR) 100 MG Tab TAKE 1 TABLET(100 MG) BY MOUTH EVERY 12 HOURS AS NEEDED (Patient taking differently: Take 100 mg by mouth every 12 (twelve) hours as needed (Atrial Fibrillation).)    folic acid (FOLVITE) 1 MG tablet Take 1 tablet (1 mg total) by mouth once daily.    ketoconazole (NIZORAL) 2 % cream Apply topically once daily. Apply to affected skin from toes to heels twice a day for 3-4 weeks.    magnesium oxide (MAG-OX) 400 mg (241.3 mg magnesium) tablet Take 1 tablet (400 mg total) by mouth once daily.    metFORMIN (GLUCOPHAGE-XR) 500 MG ER 24hr tablet Take 2 tablets (1,000 mg total) by mouth 2 (two) times daily with meals.    multivitamin (THERAGRAN) tablet Take 1 tablet by mouth once daily.    omega-3 acid ethyl esters (LOVAZA) 1 gram capsule Take 2 g by mouth 2 (two) times daily.    pantoprazole (PROTONIX) 40 MG tablet Take 1 tablet (40 mg total) by mouth once daily.    rOPINIRole (REQUIP) 1 MG tablet TAKE 1 TABLET(1 MG) BY MOUTH EVERY EVENING (Patient taking differently: Take 1 mg by mouth every evening.)    rosuvastatin (CRESTOR) 20 MG tablet TAKE 1 TABLET(20 MG) BY MOUTH EVERY DAY    sertraline (ZOLOFT) 100 MG tablet Take 150 mg by mouth every morning.    VITAMIN B-1 100 MG tablet TAKE 1 TABLET BY MOUTH ONCE DAILY    metoprolol succinate (TOPROL-XL) 25 MG 24 hr tablet TAKE 1 TABLET(25 MG) BY MOUTH EVERY DAY    naltrexone (DEPADE) 50 mg tablet Take 50 mg by mouth once daily. (Patient not taking:  Reported on 6/19/2023)    tamsulosin (FLOMAX) 0.4 mg Cap TAKE 1 CAPSULE(0.4 MG) BY MOUTH EVERY DAY (Patient taking differently: Take 0.4 mg by mouth every evening.)    venlafaxine (EFFEXOR-XR) 75 MG 24 hr capsule Take 1 capsule (75 mg total) by mouth once daily.    [DISCONTINUED] hydrOXYzine HCL (ATARAX) 25 MG tablet TAKE 1 TABLET(25 MG) BY MOUTH THREE TIMES DAILY AS NEEDED FOR ANXIETY    [DISCONTINUED] JANUVIA 100 mg Tab TAKE 1 TABLET(100 MG) BY MOUTH EVERY DAY    [DISCONTINUED] meclizine (ANTIVERT) 25 mg tablet Take 1 tablet (25 mg total) by mouth 2 (two) times daily as needed.    [DISCONTINUED] ondansetron (ZOFRAN) 8 MG tablet Take 1 tablet (8 mg total) by mouth every 8 (eight) hours as needed for Nausea.    [DISCONTINUED] sertraline (ZOLOFT) 50 MG tablet once daily.     Family History       Problem Relation (Age of Onset)    Alcohol abuse Brother    Alzheimer's disease Mother, Brother    Cancer Father, Sister    Colon cancer Father    Colon polyps Brother    Diabetes Mother    Lung cancer Father, Sister          Tobacco Use    Smoking status: Never    Smokeless tobacco: Never   Substance and Sexual Activity    Alcohol use: Yes     Alcohol/week: 84.0 standard drinks of alcohol     Types: 84 Cans of beer per week     Comment: 12 beers daily    Drug use: No    Sexual activity: Not on file     Review of Systems   Constitutional:  Positive for activity change. Negative for appetite change, chills and fever.   HENT:  Negative for sinus pressure, sore throat and trouble swallowing.    Respiratory:  Positive for cough and shortness of breath. Negative for chest tightness.    Cardiovascular:  Negative for chest pain, palpitations and leg swelling.   Gastrointestinal:  Negative for abdominal pain, nausea and vomiting.   Genitourinary:  Negative for dysuria.        Nocturia   Musculoskeletal:  Negative for back pain and joint swelling.   Skin:  Negative for rash.   Neurological: Negative.    Hematological:   Does not bruise/bleed easily.   Psychiatric/Behavioral:  Negative for confusion.      Objective:     Vital Signs (Most Recent):  Temp: 98 °F (36.7 °C) (10/28/23 2000)  Pulse: 105 (10/28/23 2000)  Resp: 20 (10/28/23 2000)  BP: (!) 133/95 (10/28/23 2000)  SpO2: 98 % (10/28/23 2000) Vital Signs (24h Range):  Temp:  [97.9 °F (36.6 °C)-98 °F (36.7 °C)] 98 °F (36.7 °C)  Pulse:  [103-152] 105  Resp:  [14-40] 20  SpO2:  [97 %-99 %] 98 %  BP: (113-151)/(64-95) 133/95     Weight: 79.4 kg (175 lb)  Body mass index is 28.25 kg/m².     Physical Exam  Vitals and nursing note reviewed.   Constitutional:       General: He is not in acute distress.     Appearance: He is not ill-appearing or diaphoretic.   HENT:      Head: Normocephalic and atraumatic.      Mouth/Throat:      Mouth: Mucous membranes are moist.      Pharynx: Oropharynx is clear. No oropharyngeal exudate or posterior oropharyngeal erythema.   Eyes:      General: No scleral icterus.     Extraocular Movements: Extraocular movements intact.      Pupils: Pupils are equal, round, and reactive to light.      Comments: No nystagmus   Cardiovascular:      Rate and Rhythm: Tachycardia present. Rhythm irregular.      Pulses: Normal pulses.      Heart sounds: No murmur heard.  Pulmonary:      Effort: Pulmonary effort is normal. No respiratory distress.      Breath sounds: No rales.      Comments: Right sided mid to lower lung field wheezes present, left basilar  Abdominal:      General: Bowel sounds are normal. There is no distension.      Palpations: Abdomen is soft.      Tenderness: There is no abdominal tenderness.   Musculoskeletal:      Cervical back: Neck supple. No rigidity.      Right lower leg: No edema.      Left lower leg: No edema.      Comments: Right TKA scar   Lymphadenopathy:      Cervical: No cervical adenopathy.   Skin:     General: Skin is warm and dry.      Comments: Fungal dystrophic changes of bilateral feet/nails   Neurological:      General: No focal  "deficit present.      Mental Status: He is alert and oriented to person, place, and time.      Comments: No asterixis   Psychiatric:         Cognition and Memory: Cognition is not impaired. Memory is not impaired.              CRANIAL NERVES     CN III, IV, VI   Pupils are equal, round, and reactive to light.       Significant Labs: All pertinent labs within the past 24 hours have been reviewed.  CBC:   Recent Labs   Lab 10/28/23  1603 10/28/23  1747   WBC 11.26  --    HGB 16.8  --    HCT 50.0 50     --      CMP:   Recent Labs   Lab 10/28/23  1839   *   K 4.5      CO2 22*   *   BUN 11   CREATININE 1.2   CALCIUM 9.9   PROT 7.6   ALBUMIN 4.1   BILITOT 0.8   ALKPHOS 83   AST 32   ALT 23   ANIONGAP 13     Cardiac Markers:   Recent Labs   Lab 10/28/23  1603   *     Coagulation:   Recent Labs   Lab 10/28/23  1603   INR 1.0     Lactic Acid: No results for input(s): "LACTATE" in the last 48 hours.  Magnesium:   Recent Labs   Lab 10/28/23  1839   MG 1.7     Troponin:   Recent Labs   Lab 10/28/23  1603   TROPONINI <0.006     Urine Studies: No results for input(s): "COLORU", "APPEARANCEUA", "PHUR", "SPECGRAV", "PROTEINUA", "GLUCUA", "KETONESU", "BILIRUBINUA", "OCCULTUA", "NITRITE", "UROBILINOGEN", "LEUKOCYTESUR", "RBCUA", "WBCUA", "BACTERIA", "SQUAMEPITHEL", "HYALINECASTS" in the last 48 hours.    Invalid input(s): "WRIGHTSUR"    Significant Imaging: I have reviewed all pertinent imaging results/findings within the past 24 hours.    XR CHEST AP PORTABLE     CLINICAL HISTORY:  SOB;     TECHNIQUE:  Single frontal view of the chest was performed.     COMPARISON:  11/29/2022     FINDINGS:  The cardiomediastinal silhouette is not enlarged.  There is no pleural effusion.  The trachea is midline.  The lungs are symmetrically expanded bilaterally with mildly coarse interstitial attenuation, accentuated by shallow inspiratory effort..  No large focal consolidation seen.  There is no pneumothorax.  The " osseous structures are remarkable for degenerative change..     Impression:     1. No acute cardiopulmonary process.        Electronically signed by: Vinny Mcneal MD  Date:                                            10/28/2023  Time:                                           17:06    Assessment/Plan:     * Atrial fibrillation with RVR  Patient with Long standing persistent (>12 months) atrial fibrillation which is uncontrolled currently with Beta Blocker and Flecainide. Patient is currently in atrial fibrillation.  -patient is not regularly taking is beta blocker and using PRN flecainide, no use today.  Restart scheduled flecainide now, if patient converts to sinus rhythm, can change to PRN use   -Given multiple doses of metoprolol and HR trend is improving in 110-120s range, restart toprol XL  -patient's non-adherence to metoprolol and also relapse of Alcohol dependence with increased use likely contributing.     If above measures are not controlling pts HR - consult cardiology inpatient.   Also refer to cardiology as outpatient, last saw Dr. Tolentino approx 1 year prior    [id number] Score: 2.   Anticoagulation indicated. Anticoagulation done with apixaban.    Alcohol use disorder, moderate, dependence  -Patient with long standing alcohol dependence, reports multiple bouts of rehab programs, reporting a recent period of sobriety x  Mos.   _patient links his relapse to lack of xanax use.  Discussed with patient that will not restart PRN xanax, can monitor for alcohol withdrawal as per typical inpatient protocol. CIWA-AR monitoring and PRN symptom based ativan.      -He is taking Sertraline 150mg daily   -He is not using naltrexone prescribed as outpatient - restart now   -discuss details of his outpatient program.  He should f/u with Psychiatry regarding any initiation or use of BZD as adjunctive treatment for his anxiety,alcohol dependence    Bronchitis  Reports 2 week history of cold symptoms, he has new  wheezing on pulmonary exam, no pneumonia or acute process on chest xray   -obtain respiratory culture if possible  -Start Nebulized Ipratropium, avoiding beta agnoist given Afib with RVR   -start empiric antibiotic course if Bronchitis is trigger for Afib w RVR      Chronic diastolic congestive heart failure  Patient is identified as having Diastolic (HFpEF) heart failure that is Chronic. CHF is currently controlled.   -he is non-adherent to Toprol XL, has not required diuretics chronically for congestive symptoms, based on exam, CXR evaluation today no acute concern for decompensated CHF.   -BNP in 200s range, first troponin negative  -no active anginal complaints.     Latest ECHO performed and demonstrates- Results for orders placed during the hospital encounter of 11/29/22    Echo    Interpretation Summary  · The left ventricle is normal in size with mildly decreased systolic function. The estimated ejection fraction is 45-50%.  · There is mild left ventricular global hypokinesis.  · Normal right ventricular size with normal right ventricular systolic function.  · Grade I left ventricular diastolic dysfunction.  · The estimated PA systolic pressure is 33 mmHg.  · Normal central venous pressure (3 mmHg).  . Continue Beta Blocker and monitor clinical status closely. Monitor on telemetry.Monitor strict Is&Os and daily weights.  Place on fluid restriction of 1800ml. Cardiology has not been any consulted. Continue to stress to patient importance of self efficacy and  on diet for CHF. Last BNP reviewed- and noted below   Recent Labs   Lab 10/28/23  1603   *   .    Recurrent major depressive disorder, in partial remission  Patient has persistent depression which is severe and is currently uncontrolled. Will Continue anti-depressant medications. We will not consult psychiatry at this time. Patient does not display psychosis at this time. Continue to monitor closely and adjust plan of care as  "needed.    Continue home Sertraline - last filled 150mg po daily dosage    Type 2 diabetes mellitus without complication, without long-term current use of insulin  Patient's FSGs are controlled on current medication regimen.  Last A1c reviewed-   Lab Results   Component Value Date    HGBA1C 5.2 12/15/2022     Most recent fingerstick glucose reviewed- No results for input(s): "POCTGLUCOSE" in the last 24 hours.  Current correctional scale  Low  Maintain anti-hyperglycemic dose as follows-   Antihyperglycemics (From admission, onward)    Start     Stop Route Frequency Ordered    10/29/23 0645  insulin aspart U-100 pen 0-5 Units         -- SubQ 2 times daily before meals 10/28/23 2118        Hold Oral hypoglycemics while patient is in the hospital.    Idiopathic chronic gout of multiple sites without tophus  Chronic/stable previously affected bilateral hallux and hands, no flare in years  Continue home dose allpourinol      Chronic pain of right knee  Hx of right knee osteoarthritis s/p knee replacement uses voltaren gel PRN    Was taking PRN advil as outpatient, he had been advised not to take NSAID, didn't realize this was nsaid.  Please provide patient information re: Nsaid on discharge    PRN acetaminophen    Generalized anxiety disorder  Patient taking sertraline as outpatient.         VTE Risk Mitigation (From admission, onward)         Ordered     apixaban tablet 5 mg  2 times daily         10/28/23 2051     Reason for No Pharmacological VTE Prophylaxis  Once        Question:  Reasons:  Answer:  Already adequately anticoagulated on oral Anticoagulants    10/28/23 2051     IP VTE HIGH RISK PATIENT  Once         10/28/23 2051     Place sequential compression device  Until discontinued         10/28/23 2051              On 10/28/2023, patient should be placed in hospital observation services under my care.      Garrick Kramer MD  Department of Hospital Medicine  [facility] - Cardiology " Stepdown    COMPLETED  Family history is reviewed and has not changed

## 2023-10-29 NOTE — ED NOTES
Telemetry Verification   Patient placed on Telemetry Box  Verified with War Room  Tech    Box # 2019   Rate 112   Rhythm afib

## 2023-10-29 NOTE — PLAN OF CARE
Patient is ready for discharge. Patient stable alert and oriented. IV removed. No complaints of pain. Discussed discharge plan. Reviewed medications and side effects, appointments, and answered questions with patient and family. Medications picked up from pharmacy.

## 2023-10-29 NOTE — HPI
69 y/o male with PMHx significant for HFmrEF (last EF 45-50% 2022), longstanding persistent atrial fibrillation, alcohol dependence, hx of RCC who presented to the ED with complaints of dyspnea and cough.      He reports a 2 weeks history of cough and dyspnea on exertion. Reduced exercise tolerance, reporting difficulty walking more than 50 feet without stopping to catch his breath.     In the ED, found to have atrial fibrillation with RVR. He received 3 doses 5mg IV metoprolol and Lopressor 50mg at this time.  Previously seen by Dr. Tolentino and Dr. Giang for atrial fibrillation. He has more frequent AFib episodes when drinking. Recently relapsed after several years of sobriety. He was on flecainide prn regimen, eliquis (compliant), and he ran out of his metoprolol a few weeks ago. He is symptomatic when in AFib with SOB, anxiety, and REY.  He has not used flecainide in several months. He reports that his PRN xanax had run out and his anxiety was trigger for his relapse. He was offered PVI ablation with Dr. Giang in 2019 but ended up canceling as his symptoms were better controlled. He is tired of taking medication especially when he is asymptomatic. He would like to consider PVI again. Converted to sinus rhythm overnight.

## 2023-10-29 NOTE — PLAN OF CARE
[facility] - Cardiology Stepdown  Discharge Final Note    Primary Care Provider: Bacilio Larry MD    Expected Discharge Date: 10/29/2023    Final Discharge Note (most recent)       Final Note - 10/29/23 1054          Final Note    Assessment Type Final Discharge Note (P)      Anticipated Discharge Disposition Home or Self Care (P)      Hospital Resources/Appts/Education Provided Provided patient/caregiver with written discharge plan information;Provided education on problems/symptoms using teachback;Appointments scheduled and added to AVS (P)         Post-Acute Status    Other Status No Post-Acute Service Needs (P)      Discharge Delays None known at this time (P)                      Important Message from Medicare             SW noting pt's discharge orders. After review of pt's medical record, no discharge needs identified at this time.     Daniel Persaud LMSW

## 2023-10-29 NOTE — SUBJECTIVE & OBJECTIVE
"Past Medical History:   Diagnosis Date    A-fib     Alcohol abuse     Anxiety     Arthritis     Chronic gout     Diabetes mellitus     DM (diabetes mellitus) 11/16/2016    "boarderline, not taking any meds currently"    Fatty liver     Hyperlipidemia     Renal cell cancer 2014    S/P TKR (total knee replacement), right 6/27/2019     Past Surgical History:   Procedure Laterality Date    ABSCESS DRAINAGE      perirectal    COLONOSCOPY      HAND SURGERY Left     JOINT REPLACEMENT      knee replacement right knee    KIDNEY SURGERY      partial left kidney removal - [place]    PARTIAL NEPHRECTOMY Left August 2014    PERCUTANEOUS CRYOTHERAPY OF PERIPHERAL NERVE USING LIQUID NITROUS OXIDE IN CLOSED NEEDLE DEVICE Right 6/17/2019    Procedure: CRYOTHERAPY, NERVE, PERIPHERAL, PERCUTANEOUS, USING LIQUID NITROUS OXIDE IN CLOSED NEEDLE DEVICE-right knee iovera;  Surgeon: Donny Hair III, MD;  Location: [facility] CATH LAB;  Service: Pain Management;  Laterality: Right;    TOTAL KNEE ARTHROPLASTY Right 6/27/2019    Procedure: ARTHROPLASTY, KNEE, TOTAL-SAME DAY;  Surgeon: Mikael Huerta MD;  Location: [facility] OR [address] [place] [address];  Service: Orthopedics;  Laterality: Right;     Review of patient's allergies indicates:   Allergen Reactions    No known drug allergies      No current facility-administered medications on file prior to encounter.     Current Outpatient Medications on File Prior to Encounter   Medication Sig    allopurinoL (ZYLOPRIM) 100 MG tablet Take 2 tablets (200 mg total) by mouth once daily.    ALPRAZolam (XANAX) 0.5 MG tablet TAKE 1 TABLET(0.5 MG) BY MOUTH EVERY NIGHT AS NEEDED FOR ANXIETY    cyanocobalamin (VITAMIN B-12) 1000 MCG tablet Take 1 tablet (1,000 mcg total) by mouth once daily.    diclofenac sodium (VOLTAREN) 1 % Gel Apply 2 g topically once daily. (Patient taking differently: Apply 2 g topically 3 (three) times daily as needed (Right knee - hand pain).)    ELIQUIS 5 mg Tab TAKE 1 TABLET BY MOUTH TWICE DAILY " (Patient taking differently: Take 5 mg by mouth 2 (two) times daily.)    flecainide (TAMBOCOR) 100 MG Tab TAKE 1 TABLET(100 MG) BY MOUTH EVERY 12 HOURS AS NEEDED (Patient taking differently: Take 100 mg by mouth every 12 (twelve) hours as needed (Atrial Fibrillation).)    folic acid (FOLVITE) 1 MG tablet Take 1 tablet (1 mg total) by mouth once daily.    ketoconazole (NIZORAL) 2 % cream Apply topically once daily. Apply to affected skin from toes to heels twice a day for 3-4 weeks.    magnesium oxide (MAG-OX) 400 mg (241.3 mg magnesium) tablet Take 1 tablet (400 mg total) by mouth once daily.    metFORMIN (GLUCOPHAGE-XR) 500 MG ER 24hr tablet Take 2 tablets (1,000 mg total) by mouth 2 (two) times daily with meals.    multivitamin (THERAGRAN) tablet Take 1 tablet by mouth once daily.    omega-3 acid ethyl esters (LOVAZA) 1 gram capsule Take 2 g by mouth 2 (two) times daily.    pantoprazole (PROTONIX) 40 MG tablet Take 1 tablet (40 mg total) by mouth once daily.    rOPINIRole (REQUIP) 1 MG tablet TAKE 1 TABLET(1 MG) BY MOUTH EVERY EVENING (Patient taking differently: Take 1 mg by mouth every evening.)    rosuvastatin (CRESTOR) 20 MG tablet TAKE 1 TABLET(20 MG) BY MOUTH EVERY DAY    sertraline (ZOLOFT) 100 MG tablet Take 150 mg by mouth every evening.    VITAMIN B-1 100 MG tablet TAKE 1 TABLET BY MOUTH ONCE DAILY    metoprolol succinate (TOPROL-XL) 25 MG 24 hr tablet TAKE 1 TABLET(25 MG) BY MOUTH EVERY DAY    naltrexone (DEPADE) 50 mg tablet Take 50 mg by mouth once daily. (Patient not taking: Reported on 6/19/2023)    tamsulosin (FLOMAX) 0.4 mg Cap TAKE 1 CAPSULE(0.4 MG) BY MOUTH EVERY DAY (Patient taking differently: Take 0.4 mg by mouth every evening.)    venlafaxine (EFFEXOR-XR) 75 MG 24 hr capsule Take 1 capsule (75 mg total) by mouth once daily.     Family History       Problem Relation (Age of Onset)    Alcohol abuse Brother    Alzheimer's disease Mother, Brother    Cancer Father, Sister    Colon cancer Father     Colon polyps Brother    Diabetes Mother    Lung cancer Father, Sister          Tobacco Use    Smoking status: Never    Smokeless tobacco: Never   Substance and Sexual Activity    Alcohol use: Yes     Alcohol/week: 84.0 standard drinks of alcohol     Types: 84 Cans of beer per week     Comment: 12 beers daily    Drug use: No    Sexual activity: Not on file     Review of Systems   Constitutional: Negative for chills, diaphoresis and night sweats.   Cardiovascular:  Positive for dyspnea on exertion, irregular heartbeat and palpitations. Negative for chest pain, leg swelling, near-syncope, orthopnea and syncope.   Respiratory:  Negative for cough, shortness of breath and wheezing.    Skin:  Negative for color change and dry skin.   Musculoskeletal:  Negative for joint pain and joint swelling.   Gastrointestinal:  Negative for abdominal pain, diarrhea, nausea and vomiting.   Genitourinary:  Negative for dysuria.   Neurological:  Negative for dizziness, headaches, light-headedness and weakness.   All other systems reviewed and are negative.    Objective:     Vital Signs (Most Recent):  Temp: 97.5 °F (36.4 °C) (10/29/23 0823)  Pulse: 65 (10/29/23 0823)  Resp: 20 (10/29/23 0823)  BP: 120/66 (10/29/23 0823)  SpO2: 99 % (10/29/23 0823) Vital Signs (24h Range):  Temp:  [96.6 °F (35.9 °C)-98.1 °F (36.7 °C)] 97.5 °F (36.4 °C)  Pulse:  [] 65  Resp:  [14-40] 20  SpO2:  [97 %-99 %] 99 %  BP: (104-151)/(58-95) 120/66     Weight: 84 kg (185 lb 3 oz)  Body mass index is 29.89 kg/m².    Physical Exam  Constitutional:       Appearance: He is well-developed. He is not diaphoretic.   HENT:      Head: Normocephalic and atraumatic.   Eyes:      General: No scleral icterus.     Conjunctiva/sclera: Conjunctivae normal.      Pupils: Pupils are equal, round, and reactive to light.   Neck:      Vascular: No JVD.   Cardiovascular:      Rate and Rhythm: Normal rate and regular rhythm.      Pulses: Intact distal pulses.      Heart sounds:  Normal heart sounds. No murmur heard.     No friction rub. No gallop.   Pulmonary:      Effort: Pulmonary effort is normal.      Breath sounds: No wheezing or rales.   Abdominal:      General: Bowel sounds are normal. There is no distension.      Tenderness: There is no abdominal tenderness.   Musculoskeletal:      Right lower leg: No edema.      Left lower leg: No edema.   Skin:     General: Skin is warm.      Capillary Refill: Capillary refill takes less than 2 seconds.      Findings: No erythema or rash.   Neurological:      General: No focal deficit present.      Mental Status: He is alert and oriented to person, place, and time.   Psychiatric:         Thought Content: Thought content normal.         Judgment: Judgment normal.     Significant Labs: EP:   Recent Labs   Lab 10/28/23  1603 10/28/23  1747 10/28/23  1839 10/29/23  0609   NA  --   --  135* 132*   K  --   --  4.5 3.8   CL  --   --  100 98   CO2  --   --  22* 20*   GLU  --   --  163* 171*   BUN  --   --  11 15   CREATININE  --   --  1.2 1.2   CALCIUM  --   --  9.9 9.5   PROT  --   --  7.6  --    ALBUMIN  --   --  4.1 3.9   BILITOT  --   --  0.8  --    ALKPHOS  --   --  83  --    AST  --   --  32  --    ALT  --   --  23  --    ANIONGAP  --   --  13 14   WBC 11.26  --   --  9.03   HGB 16.8  --   --  13.8*   HCT 50.0   < >  --  41.9     --   --  184   INR 1.0  --   --   --     < > = values in this interval not displayed.     Significant Imaging: Reviewed

## 2023-10-29 NOTE — ASSESSMENT & PLAN NOTE
Patient has persistent depression which is severe and is currently uncontrolled. Will Continue anti-depressant medications. We will not consult psychiatry at this time. Patient does not display psychosis at this time. Continue to monitor closely and adjust plan of care as needed.    Continue home Sertraline - last filled 150mg po daily dosage

## 2023-10-29 NOTE — ASSESSMENT & PLAN NOTE
Chronic/stable previously affected bilateral hallux and hands, no flare in years  Continue home dose allpourinol

## 2023-10-29 NOTE — HOSPITAL COURSE
Patient admitted to hospital medicine for symptomatic AFib with RVR.  Converted to sinus rhythm following ED intervention in initiation of metoprolol. EP consulted.  Recommend discontinuing flecainide.  Restart and continue metoprolol succinate 25 mg daily and initiate Multaq on 10/31 (48 hours after off flecainide).  Further, ambulatory TTE stress test to initiate evaluation for future AFib therapies.  Per EP, stable for discharge.  Continue course of doxycycline for bronchitis/sinusitis given potential AFib trigger in addition to alcohol use. Discussed cessation of alcohol use with patient at length; patient agreeable.  No signs or symptoms to suggest acute withdrawal.  Continue naltrexone at home.  Also discussed anxiety.  Patient infrequently taking sertraline as needed; discussed need for daily dosing.  Will refill Xanax for acute anxiety as sertraline takes affect.  Discussed that he will need sertraline up titration with PCP and Psychiatry in coming weeks.  Son-in-law at bedside; notes use of pill boxes for medication organization.  Patient without further hospitalization needs.  Patient medically stable for discharge.  Return precautions advised; patient's questions answered.  Patient in agreement with discharge plan.    Vitals:    10/29/23 1000 10/29/23 1141 10/29/23 1145 10/29/23 1200   BP:  127/75     BP Location:       Patient Position:  Lying     Pulse: 65 67 75 68   Resp:  18     Temp:  97.6 °F (36.4 °C)     TempSrc:  Oral     SpO2:  98%     Weight:       Height:           Physical Exam  Vitals and nursing note reviewed.   Constitutional:       General: He is not in acute distress.     Appearance: He is not ill-appearing or diaphoretic.   HENT:      Head: Normocephalic and atraumatic.      Mouth/Throat:      Mouth: Mucous membranes are moist.      Pharynx: Oropharynx is clear. No oropharyngeal exudate or posterior oropharyngeal erythema.   Eyes:      General: No scleral icterus.  Cardiovascular:       Rate and Rhythm:  Regular rate and rhythm      Pulses: Normal pulses.      Heart sounds: No murmur heard.  Pulmonary:      Effort: Pulmonary effort is normal. No respiratory distress.      Breath sounds: No rales or wheezes.  Abdominal:      General: Bowel sounds are normal. There is no distension.      Palpations: Abdomen is soft.      Tenderness: There is no abdominal tenderness.   Musculoskeletal:      Comments: Right TKA scar   Lymphadenopathy:      Cervical: No cervical adenopathy.   Neurological:      General: No focal deficit present.      Mental Status: He is alert and oriented to person, place, and time.      Comments: No asterixis   Psychiatric:         Cognition and Memory: Cognition is not impaired. Memory is not impaired.

## 2023-10-29 NOTE — HPI
67 y/o WM hx of HFpEF (last ef 45-50%) Longstanding persistent Atrial Fibrillation, Alcohol dependence, hx of RCC presents to the ED with complaints of dyspnea and cough.     He has reported a 2 weeks history of cough initially productive w/ clear sputum, now changes to green sputum.  He has associated symptoms of dyspnea and dyspnea on exertion.  Reduced exercise tolerance, reporting difficulty walking more than 50 feet without stopping to catch his breath. He denies any sinus pain, no fevers or chills, some sore throat but resolving, cough symptom is persistent.     In the ED found to have atrial fibrillation with RVR.  He has received 3 doses 5mg IV metoprolol and IR Lopressor 50mg at this time.  His prior cardiology notes comment on concern for continued alcohol use and uncontrolled atrial fibrillation, he has been prescribed Flecainide pill-in-pocket PRN use.  He has not used FLecainide in 2 days.  He states its been weeks sine he's taken Toprol XL.  He reports being able to tell when he is in Afib by measuring pulse or can feel irregular beats. Reports adherence to apixaban therapy     Regarding alcohol dependence, attended inpatient rehab program for 3 weeks in April, states that he's been following with outpatient rehab services/program but he has had a relapse of alcohol use 2 weeks ago.  He reports that his PRN xanax had run out and resulted in him drinking alcohol again.  He has been drinking beer approximately every other day, last use was 3 days ago - drank 6-8 12oz cans of beer.  He feels if he can have xanax back he won't have to drink alcohol.  Naltrexone is on med list but states he has it but never has used.  Reports adheren    ED Treatment - IV metoprolol 5mg x 3,  PO Metoprolol 50mg

## 2023-10-29 NOTE — ASSESSMENT & PLAN NOTE
Reports 2 week history of cold symptoms, he has new wheezing on pulmonary exam, no pneumonia or acute process on chest xray   -obtain respiratory culture if possible  -Start Nebulized Ipratropium, avoiding beta agnoist given Afib with RVR   -start empiric antibiotic course if Bronchitis is trigger for Afib w RVR

## 2023-10-29 NOTE — SUBJECTIVE & OBJECTIVE
"Past Medical History:   Diagnosis Date    A-fib     Alcohol abuse     Anxiety     Arthritis     Chronic gout     Diabetes mellitus     DM (diabetes mellitus) 11/16/2016    "boarderline, not taking any meds currently"    Fatty liver     Hyperlipidemia     Renal cell cancer 2014    S/P TKR (total knee replacement), right 6/27/2019       Past Surgical History:   Procedure Laterality Date    ABSCESS DRAINAGE      perirectal    COLONOSCOPY      HAND SURGERY Left     JOINT REPLACEMENT      knee replacement right knee    KIDNEY SURGERY      partial left kidney removal - [place]    PARTIAL NEPHRECTOMY Left August 2014    PERCUTANEOUS CRYOTHERAPY OF PERIPHERAL NERVE USING LIQUID NITROUS OXIDE IN CLOSED NEEDLE DEVICE Right 6/17/2019    Procedure: CRYOTHERAPY, NERVE, PERIPHERAL, PERCUTANEOUS, USING LIQUID NITROUS OXIDE IN CLOSED NEEDLE DEVICE-right knee iovera;  Surgeon: Donny Hair III, MD;  Location: [facility] CATH LAB;  Service: Pain Management;  Laterality: Right;    TOTAL KNEE ARTHROPLASTY Right 6/27/2019    Procedure: ARTHROPLASTY, KNEE, TOTAL-SAME DAY;  Surgeon: Mikael Huerta MD;  Location: [facility] OR [address] [place] [address];  Service: Orthopedics;  Laterality: Right;       Review of patient's allergies indicates:   Allergen Reactions    No known drug allergies        No current facility-administered medications on file prior to encounter.     Current Outpatient Medications on File Prior to Encounter   Medication Sig    allopurinoL (ZYLOPRIM) 100 MG tablet Take 2 tablets (200 mg total) by mouth once daily.    ALPRAZolam (XANAX) 0.5 MG tablet TAKE 1 TABLET(0.5 MG) BY MOUTH EVERY NIGHT AS NEEDED FOR ANXIETY    cyanocobalamin (VITAMIN B-12) 1000 MCG tablet Take 1 tablet (1,000 mcg total) by mouth once daily.    diclofenac sodium (VOLTAREN) 1 % Gel Apply 2 g topically once daily. (Patient taking differently: Apply 2 g topically 3 (three) times daily as needed (Right knee - hand pain).)    ELIQUIS 5 mg Tab TAKE 1 TABLET BY MOUTH TWICE " DAILY (Patient taking differently: Take 5 mg by mouth 2 (two) times daily.)    flecainide (TAMBOCOR) 100 MG Tab TAKE 1 TABLET(100 MG) BY MOUTH EVERY 12 HOURS AS NEEDED (Patient taking differently: Take 100 mg by mouth every 12 (twelve) hours as needed (Atrial Fibrillation).)    folic acid (FOLVITE) 1 MG tablet Take 1 tablet (1 mg total) by mouth once daily.    ketoconazole (NIZORAL) 2 % cream Apply topically once daily. Apply to affected skin from toes to heels twice a day for 3-4 weeks.    magnesium oxide (MAG-OX) 400 mg (241.3 mg magnesium) tablet Take 1 tablet (400 mg total) by mouth once daily.    metFORMIN (GLUCOPHAGE-XR) 500 MG ER 24hr tablet Take 2 tablets (1,000 mg total) by mouth 2 (two) times daily with meals.    multivitamin (THERAGRAN) tablet Take 1 tablet by mouth once daily.    omega-3 acid ethyl esters (LOVAZA) 1 gram capsule Take 2 g by mouth 2 (two) times daily.    pantoprazole (PROTONIX) 40 MG tablet Take 1 tablet (40 mg total) by mouth once daily.    rOPINIRole (REQUIP) 1 MG tablet TAKE 1 TABLET(1 MG) BY MOUTH EVERY EVENING (Patient taking differently: Take 1 mg by mouth every evening.)    rosuvastatin (CRESTOR) 20 MG tablet TAKE 1 TABLET(20 MG) BY MOUTH EVERY DAY    sertraline (ZOLOFT) 100 MG tablet Take 150 mg by mouth every morning.    VITAMIN B-1 100 MG tablet TAKE 1 TABLET BY MOUTH ONCE DAILY    metoprolol succinate (TOPROL-XL) 25 MG 24 hr tablet TAKE 1 TABLET(25 MG) BY MOUTH EVERY DAY    naltrexone (DEPADE) 50 mg tablet Take 50 mg by mouth once daily. (Patient not taking: Reported on 6/19/2023)    tamsulosin (FLOMAX) 0.4 mg Cap TAKE 1 CAPSULE(0.4 MG) BY MOUTH EVERY DAY (Patient taking differently: Take 0.4 mg by mouth every evening.)    venlafaxine (EFFEXOR-XR) 75 MG 24 hr capsule Take 1 capsule (75 mg total) by mouth once daily.    [DISCONTINUED] hydrOXYzine HCL (ATARAX) 25 MG tablet TAKE 1 TABLET(25 MG) BY MOUTH THREE TIMES DAILY AS NEEDED FOR ANXIETY    [DISCONTINUED] JANUVIA 100 mg Tab  TAKE 1 TABLET(100 MG) BY MOUTH EVERY DAY    [DISCONTINUED] meclizine (ANTIVERT) 25 mg tablet Take 1 tablet (25 mg total) by mouth 2 (two) times daily as needed.    [DISCONTINUED] ondansetron (ZOFRAN) 8 MG tablet Take 1 tablet (8 mg total) by mouth every 8 (eight) hours as needed for Nausea.    [DISCONTINUED] sertraline (ZOLOFT) 50 MG tablet once daily.     Family History       Problem Relation (Age of Onset)    Alcohol abuse Brother    Alzheimer's disease Mother, Brother    Cancer Father, Sister    Colon cancer Father    Colon polyps Brother    Diabetes Mother    Lung cancer Father, Sister          Tobacco Use    Smoking status: Never    Smokeless tobacco: Never   Substance and Sexual Activity    Alcohol use: Yes     Alcohol/week: 84.0 standard drinks of alcohol     Types: 84 Cans of beer per week     Comment: 12 beers daily    Drug use: No    Sexual activity: Not on file     Review of Systems   Constitutional:  Positive for activity change. Negative for appetite change, chills and fever.   HENT:  Negative for sinus pressure, sore throat and trouble swallowing.    Respiratory:  Positive for cough and shortness of breath. Negative for chest tightness.    Cardiovascular:  Negative for chest pain, palpitations and leg swelling.   Gastrointestinal:  Negative for abdominal pain, nausea and vomiting.   Genitourinary:  Negative for dysuria.        Nocturia   Musculoskeletal:  Negative for back pain and joint swelling.   Skin:  Negative for rash.   Neurological: Negative.    Hematological:  Does not bruise/bleed easily.   Psychiatric/Behavioral:  Negative for confusion.      Objective:     Vital Signs (Most Recent):  Temp: 98 °F (36.7 °C) (10/28/23 2000)  Pulse: 105 (10/28/23 2000)  Resp: 20 (10/28/23 2000)  BP: (!) 133/95 (10/28/23 2000)  SpO2: 98 % (10/28/23 2000) Vital Signs (24h Range):  Temp:  [97.9 °F (36.6 °C)-98 °F (36.7 °C)] 98 °F (36.7 °C)  Pulse:  [103-152] 105  Resp:  [14-40] 20  SpO2:  [97 %-99 %] 98 %  BP:  (113-151)/(64-95) 133/95     Weight: 79.4 kg (175 lb)  Body mass index is 28.25 kg/m².     Physical Exam  Vitals and nursing note reviewed.   Constitutional:       General: He is not in acute distress.     Appearance: He is not ill-appearing or diaphoretic.   HENT:      Head: Normocephalic and atraumatic.      Mouth/Throat:      Mouth: Mucous membranes are moist.      Pharynx: Oropharynx is clear. No oropharyngeal exudate or posterior oropharyngeal erythema.   Eyes:      General: No scleral icterus.     Extraocular Movements: Extraocular movements intact.      Pupils: Pupils are equal, round, and reactive to light.      Comments: No nystagmus   Cardiovascular:      Rate and Rhythm: Tachycardia present. Rhythm irregular.      Pulses: Normal pulses.      Heart sounds: No murmur heard.  Pulmonary:      Effort: Pulmonary effort is normal. No respiratory distress.      Breath sounds: No rales.      Comments: Right sided mid to lower lung field wheezes present, left basilar  Abdominal:      General: Bowel sounds are normal. There is no distension.      Palpations: Abdomen is soft.      Tenderness: There is no abdominal tenderness.   Musculoskeletal:      Cervical back: Neck supple. No rigidity.      Right lower leg: No edema.      Left lower leg: No edema.      Comments: Right TKA scar   Lymphadenopathy:      Cervical: No cervical adenopathy.   Skin:     General: Skin is warm and dry.      Comments: Fungal dystrophic changes of bilateral feet/nails   Neurological:      General: No focal deficit present.      Mental Status: He is alert and oriented to person, place, and time.      Comments: No asterixis   Psychiatric:         Cognition and Memory: Cognition is not impaired. Memory is not impaired.              CRANIAL NERVES     CN III, IV, VI   Pupils are equal, round, and reactive to light.       Significant Labs: All pertinent labs within the past 24 hours have been reviewed.  CBC:   Recent Labs   Lab 10/28/23  1603  "10/28/23  1747   WBC 11.26  --    HGB 16.8  --    HCT 50.0 50     --      CMP:   Recent Labs   Lab 10/28/23  1839   *   K 4.5      CO2 22*   *   BUN 11   CREATININE 1.2   CALCIUM 9.9   PROT 7.6   ALBUMIN 4.1   BILITOT 0.8   ALKPHOS 83   AST 32   ALT 23   ANIONGAP 13     Cardiac Markers:   Recent Labs   Lab 10/28/23  1603   *     Coagulation:   Recent Labs   Lab 10/28/23  1603   INR 1.0     Lactic Acid: No results for input(s): "LACTATE" in the last 48 hours.  Magnesium:   Recent Labs   Lab 10/28/23  1839   MG 1.7     Troponin:   Recent Labs   Lab 10/28/23  1603   TROPONINI <0.006     Urine Studies: No results for input(s): "COLORU", "APPEARANCEUA", "PHUR", "SPECGRAV", "PROTEINUA", "GLUCUA", "KETONESU", "BILIRUBINUA", "OCCULTUA", "NITRITE", "UROBILINOGEN", "LEUKOCYTESUR", "RBCUA", "WBCUA", "BACTERIA", "SQUAMEPITHEL", "HYALINECASTS" in the last 48 hours.    Invalid input(s): "WRIGHTSUR"    Significant Imaging: I have reviewed all pertinent imaging results/findings within the past 24 hours.    XR CHEST AP PORTABLE     CLINICAL HISTORY:  SOB;     TECHNIQUE:  Single frontal view of the chest was performed.     COMPARISON:  11/29/2022     FINDINGS:  The cardiomediastinal silhouette is not enlarged.  There is no pleural effusion.  The trachea is midline.  The lungs are symmetrically expanded bilaterally with mildly coarse interstitial attenuation, accentuated by shallow inspiratory effort..  No large focal consolidation seen.  There is no pneumothorax.  The osseous structures are remarkable for degenerative change..     Impression:     1. No acute cardiopulmonary process.        Electronically signed by: Vinny Mcneal MD  Date:                                            10/28/2023  Time:                                           17:06  "

## 2023-10-29 NOTE — DISCHARGE SUMMARY
[facility] Medicine  Discharge Summary      Patient Name: Donald Warren Abadie  MRN: [id number]  CARLA: [id number]  Patient Class: OP- Observation  Admission Date: 10/28/2023  Hospital Length of Stay: 0 days  Discharge Date and Time:  10/29/2023 1:22 PM  Attending Physician: Dayna Bai MD   Discharging Provider: Dayna Bai MD  Primary Care Provider: Bacilio Larry MD  [facility] Dayna Bai MD  Primary Care Team: [facility]    HPI:   69 y/o WM hx of HFpEF (last ef 45-50%) Longstanding persistent Atrial Fibrillation, Alcohol dependence, hx of RCC presents to the ED with complaints of dyspnea and cough.     He has reported a 2 weeks history of cough initially productive w/ clear sputum, now changes to green sputum.  He has associated symptoms of dyspnea and dyspnea on exertion.  Reduced exercise tolerance, reporting difficulty walking more than 50 feet without stopping to catch his breath. He denies any sinus pain, no fevers or chills, some sore throat but resolving, cough symptom is persistent.     In the ED found to have atrial fibrillation with RVR.  He has received 3 doses 5mg IV metoprolol and IR Lopressor 50mg at this time.  His prior cardiology notes comment on concern for continued alcohol use and uncontrolled atrial fibrillation, he has been prescribed Flecainide pill-in-pocket PRN use.  He has not used FLecainide in 2 days.  He states its been weeks sine he's taken Toprol XL.  He reports being able to tell when he is in Afib by measuring pulse or can feel irregular beats. Reports adherence to apixaban therapy     Regarding alcohol dependence, attended inpatient rehab program for 3 weeks in April, states that he's been following with outpatient rehab services/program but he has had a relapse of alcohol use 2 weeks ago.  He reports that his PRN xanax had run out and resulted in him drinking alcohol again.  He has been drinking beer  approximately every other day, last use was 3 days ago - drank 6-8 12oz cans of beer.  He feels if he can have xanax back he won't have to drink alcohol.  Naltrexone is on med list but states he has it but never has used.  Reports adheren    ED Treatment - IV metoprolol 5mg x 3,  PO Metoprolol 50mg       * No surgery found *      Hospital Course:   Patient admitted to hospital medicine for symptomatic AFib with RVR.  Converted to sinus rhythm following ED intervention in initiation of metoprolol. EP consulted.  Recommend discontinuing flecainide.  Restart and continue metoprolol succinate 25 mg daily and initiate Multaq on 10/31 (48 hours after off flecainide).  Further, ambulatory TTE stress test to initiate evaluation for future AFib therapies.  Per EP, stable for discharge.  Continue course of doxycycline for bronchitis/sinusitis given potential AFib trigger in addition to alcohol use. Discussed cessation of alcohol use with patient at length; patient agreeable.  No signs or symptoms to suggest acute withdrawal.  Continue naltrexone at home.  Also discussed anxiety.  Patient infrequently taking sertraline as needed; discussed need for daily dosing.  Will refill Xanax for acute anxiety as sertraline takes affect.  Discussed that he will need sertraline up titration with PCP and Psychiatry in coming weeks.  Son-in-law at bedside; notes use of pill boxes for medication organization.  Patient without further hospitalization needs.  Patient medically stable for discharge. Amb ref EP. Return precautions advised; patient's questions answered.  Patient in agreement with discharge plan.    Vitals:    10/29/23 1000 10/29/23 1141 10/29/23 1145 10/29/23 1200   BP:  127/75     BP Location:       Patient Position:  Lying     Pulse: 65 67 75 68   Resp:  18     Temp:  97.6 °F (36.4 °C)     TempSrc:  Oral     SpO2:  98%     Weight:       Height:           Physical Exam  Vitals and nursing note reviewed.   Constitutional:        General: He is not in acute distress.     Appearance: He is not ill-appearing or diaphoretic.   HENT:      Head: Normocephalic and atraumatic.      Mouth/Throat:      Mouth: Mucous membranes are moist.      Pharynx: Oropharynx is clear. No oropharyngeal exudate or posterior oropharyngeal erythema.   Eyes:      General: No scleral icterus.  Cardiovascular:      Rate and Rhythm:  Regular rate and rhythm      Pulses: Normal pulses.      Heart sounds: No murmur heard.  Pulmonary:      Effort: Pulmonary effort is normal. No respiratory distress.      Breath sounds: No rales or wheezes.  Abdominal:      General: Bowel sounds are normal. There is no distension.      Palpations: Abdomen is soft.      Tenderness: There is no abdominal tenderness.   Musculoskeletal:      Comments: Right TKA scar   Lymphadenopathy:      Cervical: No cervical adenopathy.   Neurological:      General: No focal deficit present.      Mental Status: He is alert and oriented to person, place, and time.      Comments: No asterixis   Psychiatric:         Cognition and Memory: Cognition is not impaired. Memory is not impaired.           Goals of Care Treatment Preferences:  Code Status: Full Code      Consults:   Consults (From admission, onward)          Status Ordering Provider     Inpatient consult to Electrophysiology  Once        Provider:  (Not yet assigned)    Completed BAHD, RONI     Inpatient consult to Midline team  Once        Provider:  (Not yet assigned)    Completed BAHD, RONI            No new Assessment & Plan notes have been filed under this hospital service since the last note was generated.  Service: Hospital Medicine    Final Active Diagnoses:    Diagnosis Date Noted POA    PRINCIPAL PROBLEM:  Atrial fibrillation with RVR [I48.91] 05/22/2014 Yes    Bronchitis [J40] 10/28/2023 Yes    Recurrent major depressive disorder, in partial remission [F33.41] 06/19/2023 Yes    Chronic diastolic congestive heart failure [I50.32]  11/04/2022 Yes    Generalized anxiety disorder [F41.1] 05/13/2022 Yes     Chronic    Type 2 diabetes mellitus without complication, without long-term current use of insulin [E11.9] 10/07/2019 Yes    Chronic pain of right knee [M25.561, G89.29] 06/17/2019 Yes    Alcohol use disorder, moderate, dependence [F10.20]  Yes    Idiopathic chronic gout of multiple sites without tophus [M1A.09X0] 07/23/2012 Yes      Problems Resolved During this Admission:       Discharged Condition: stable    Disposition: Home or Self Care    Follow Up:    Patient Instructions:      Ambulatory referral/consult to Cardiac Electrophysiology   Standing Status: Future   Referral Priority: Urgent Referral Type: Consultation   Referral Reason: Specialty Services Required   Requested Specialty: Cardiology   Number of Visits Requested: 1     Ambulatory referral/consult to Internal Medicine   Standing Status: Future   Referral Priority: Urgent Referral Type: Consultation   Referral Reason: Specialty Services Required   Requested Specialty: Internal Medicine   Number of Visits Requested: 1     Diet Cardiac     Diet diabetic     Notify your health care provider if you experience any of the following:  persistent nausea and vomiting or diarrhea     Notify your health care provider if you experience any of the following:  difficulty breathing or increased cough     Notify your health care provider if you experience any of the following:  severe persistent headache     Notify your health care provider if you experience any of the following:  persistent dizziness, light-headedness, or visual disturbances     Notify your health care provider if you experience any of the following:  increased confusion or weakness     Nuclear Stress - Cardiology Interpreted   Standing Status: Future Standing Exp. Date: 10/29/24     Order Specific Question Answer Comments   Which stress agent will be used? Pharm    Which medicaton for the stress procedure? Dobutamine     Release to patient Immediate      Echo   Standing Status: Future Standing Exp. Date: 10/29/24     Order Specific Question Answer Comments   Release to patient Immediate      Activity as tolerated       Significant Diagnostic Studies: Labs:   CMP   Recent Labs   Lab 10/28/23  1839 10/29/23  0609   * 132*   K 4.5 3.8    98   CO2 22* 20*   * 171*   BUN 11 15   CREATININE 1.2 1.2   CALCIUM 9.9 9.5   PROT 7.6  --    ALBUMIN 4.1 3.9   BILITOT 0.8  --    ALKPHOS 83  --    AST 32  --    ALT 23  --    ANIONGAP 13 14    and CBC   Recent Labs   Lab 10/28/23  1603 10/28/23  1747 10/29/23  0609   WBC 11.26  --  9.03   HGB 16.8  --  13.8*   HCT 50.0   < > 41.9     --  184    < > = values in this interval not displayed.       Pending Diagnostic Studies:       None           Medications:  Reconciled Home Medications:      Medication List        START taking these medications      doxycycline 100 MG tablet  Commonly known as: VIBRA-TABS  Take 1 tablet (100 mg total) by mouth every 12 (twelve) hours. for 5 days     dronedarone 400 mg Tab  Commonly known as: MULTAQ  Take 1 tablet (400 mg total) by mouth 2 (two) times daily with meals.  Start taking on: October 31, 2023     naltrexone 50 mg tablet  Commonly known as: DEPADE  Take 50 mg by mouth once daily.            CHANGE how you take these medications      ALPRAZolam 0.25 MG tablet  Commonly known as: XANAX  Take 1 tablet (0.25 mg total) by mouth nightly as needed for Anxiety.  What changed:   medication strength  how much to take     ELIQUIS 5 mg Tab  Generic drug: apixaban  TAKE 1 TABLET BY MOUTH TWICE DAILY  What changed: how much to take     metoprolol succinate 25 MG 24 hr tablet  Commonly known as: TOPROL-XL  Take 1 tablet (25 mg total) by mouth once daily.  What changed: when to take this     rOPINIRole 1 MG tablet  Commonly known as: REQUIP  TAKE 1 TABLET(1 MG) BY MOUTH EVERY EVENING  What changed: when to take this     sertraline 100 MG  tablet  Commonly known as: ZOLOFT  Take this medication EVERY night. Take 50mg (half tablet) for 1 week. Increase to 100mg (1 tablet) the following week. Visit with your primary care provider prior for instructions regarding need for any further increases.  What changed:   how much to take  how to take this  when to take this  additional instructions     tamsulosin 0.4 mg Cap  Commonly known as: FLOMAX  TAKE 1 CAPSULE(0.4 MG) BY MOUTH EVERY DAY  What changed: when to take this            CONTINUE taking these medications      allopurinoL 100 MG tablet  Commonly known as: ZYLOPRIM  Take 2 tablets (200 mg total) by mouth once daily.     cyanocobalamin 1000 MCG tablet  Commonly known as: VITAMIN B-12  Take 1 tablet (1,000 mcg total) by mouth once daily.     diclofenac sodium 1 % Gel  Commonly known as: VOLTAREN  Apply 2 g topically 3 (three) times daily as needed (Right knee - hand pain).     folic acid 1 MG tablet  Commonly known as: FOLVITE  Take 1 tablet (1 mg total) by mouth once daily.     ketoconazole 2 % cream  Commonly known as: NIZORAL  Apply topically once daily. Apply to affected skin from toes to heels twice a day for 3-4 weeks.     magnesium oxide 400 mg (241.3 mg magnesium) tablet  Commonly known as: MAG-OX  Take 1 tablet (400 mg total) by mouth once daily.     metFORMIN 500 MG ER 24hr tablet  Commonly known as: GLUCOPHAGE-XR  Take 2 tablets (1,000 mg total) by mouth 2 (two) times daily with meals.     multivitamin tablet  Commonly known as: THERAGRAN  Take 1 tablet by mouth once daily.     omega-3 acid ethyl esters 1 gram capsule  Commonly known as: LOVAZA  Take 2 g by mouth 2 (two) times daily.     pantoprazole 40 MG tablet  Commonly known as: PROTONIX  Take 1 tablet (40 mg total) by mouth once daily.     rosuvastatin 20 MG tablet  Commonly known as: CRESTOR  TAKE 1 TABLET(20 MG) BY MOUTH EVERY DAY     VITAMIN B-1 100 MG tablet  Generic drug: thiamine  TAKE 1 TABLET BY MOUTH ONCE DAILY            STOP  taking these medications      flecainide 100 MG Tab  Commonly known as: TAMBOCOR     venlafaxine 75 MG 24 hr capsule  Commonly known as: EFFEXOR-XR              Indwelling Lines/Drains at time of discharge:   Lines/Drains/Airways       None                   Time spent on the discharge of patient: 45 minutes         Dayna aBi MD  Department of Hospital Medicine  [facility]

## 2023-10-29 NOTE — ASSESSMENT & PLAN NOTE
Hx of right knee osteoarthritis s/p knee replacement uses voltaren gel PRN    Was taking PRN advil as outpatient, he had been advised not to take NSAID, didn't realize this was nsaid.  Please provide patient information re: Nsaid on discharge    PRN acetaminophen

## 2023-10-29 NOTE — PLAN OF CARE
"[facility] - [facility] Stepdown  Initial Discharge Assessment       Primary Care Provider: Bacilio Larry MD    Admission Diagnosis: SOB (shortness of breath) [R06.02]  Chest pain [R07.9]  Atrial fibrillation, unspecified type [I48.91]    Admission Date: 10/28/2023  Expected Discharge Date: 10/30/2023    Sw met pt at bedside. Pt stated he lives alone and pt ambulates without assistance. Pt stated he needs to secure an appointment with a psych provider, because pt stated he  "drinks too much alcohol." Pt stated he would like a referral for psych services.       Transition of Care Barriers: (P) Other (see comments) (education on alcohol use)    Payor: [employer] MEDICARE / Plan: [employer] MEDICARE HMO / Product Type: Capitation /     Extended Emergency Contact Information  Primary Emergency Contact: Chani Gleason   [facility]  Home Phone: [phone]  Relation: Daughter    Discharge Plan A: (P) Home  Discharge Plan B: (P) Home      [id number] - [place] [facility] [address]  AT [facility] [address]  [address] [place] [address]  Phone: [phone] Fax: [phone]    [facility] - [facility] [address] [facility] [address]  [address] [facility] [address]  Phone: [phone] Fax: [phone]      Initial Assessment (most recent)       Adult Discharge Assessment - 10/28/23 2230          Discharge Assessment    Assessment Type Discharge Planning Assessment (P)      Confirmed/corrected address, phone number and insurance Yes (P)      Confirmed Demographics Correct on Facesheet (P)      Source of Information patient (P)      Does patient/caregiver understand observation status Yes (P)      Communicated DALY with patient/caregiver Date not available/Unable to determine (P)      People in Home alone (P)      Do you expect to return to your current living situation? Yes (P)      Do you have help at home or someone to help you manage your care at " home? No (P)      Prior to hospitilization cognitive status: Alert/Oriented (P)      Current cognitive status: Alert/Oriented (P)      Walking or Climbing Stairs -- (P)    none    Dressing/Bathing -- (P)    none    Home Accessibility not wheelchair accessible (P)      Equipment Currently Used at Home none (P)      Readmission within 30 days? No (P)      Patient currently being followed by outpatient case management? No (P)      Do you currently have service(s) that help you manage your care at home? No (P)      Do you take prescription medications? Yes (P)      Do you have prescription coverage? Yes (P)      Do you have any problems affording any of your prescribed medications? No (P)      Is the patient taking medications as prescribed? yes (P)      How do you get to doctors appointments? car, drives self (P)      Are you on dialysis? No (P)      Do you take coumadin? No (P)      DME Needed Upon Discharge  none (P)      Discharge Plan discussed with: Patient (P)      Transition of Care Barriers Other (see comments) (P)    education on alcohol use    Discharge Plan A Home (P)      Discharge Plan B Home (P)         Physical Activity    On average, how many days per week do you engage in moderate to strenuous exercise (like a brisk walk)? 0 days (P)      On average, how many minutes do you engage in exercise at this level? 0 min (P)         Financial Resource Strain    How hard is it for you to pay for the very basics like food, housing, medical care, and heating? Not hard at all (P)         Housing Stability    In the last 12 months, was there a time when you were not able to pay the mortgage or rent on time? No (P)      In the last 12 months, how many places have you lived? 1 (P)      In the last 12 months, was there a time when you did not have a steady place to sleep or slept in a shelter (including now)? No (P)         Transportation Needs    In the past 12 months, has lack of transportation kept you from medical  appointments or from getting medications? No (P)      In the past 12 months, has lack of transportation kept you from meetings, work, or from getting things needed for daily living? No (P)         Food Insecurity    Within the past 12 months, you worried that your food would run out before you got the money to buy more. Never true (P)      Within the past 12 months, the food you bought just didn't last and you didn't have money to get more. Never true (P)         Stress    Do you feel stress - tense, restless, nervous, or anxious, or unable to sleep at night because your mind is troubled all the time - these days? Not at all (P)         Social Connections    In a typical week, how many times do you talk on the phone with family, friends, or neighbors? More than three times a week (P)      How often do you get together with friends or relatives? More than three times a week (P)      How often do you attend Lutheran or Faith services? Never (P)      Do you belong to any clubs or organizations such as Lutheran groups, unions, fraternal or athletic groups, or school groups? No (P)      How often do you attend meetings of the clubs or organizations you belong to? Never (P)      Are you , , , , never , or living with a partner?  (P)         Alcohol Use    Q1: How often do you have a drink containing alcohol? 4 or more times a week (P)      Q2: How many drinks containing alcohol do you have on a typical day when you are drinking? 5 or 6 (P)      Q3: How often do you have six or more drinks on one occasion? Daily or almost daily (P)                       Real Dia LMSW  Case Management  Emergency Department  [phone]

## 2023-10-29 NOTE — ASSESSMENT & PLAN NOTE
Patient is identified as having Diastolic (HFpEF) heart failure that is Chronic. CHF is currently controlled.   -he is non-adherent to Toprol XL, has not required diuretics chronically for congestive symptoms, based on exam, CXR evaluation today no acute concern for decompensated CHF.   -BNP in 200s range, first troponin negative  -no active anginal complaints.     Latest ECHO performed and demonstrates- Results for orders placed during the hospital encounter of 11/29/22    Echo    Interpretation Summary  · The left ventricle is normal in size with mildly decreased systolic function. The estimated ejection fraction is 45-50%.  · There is mild left ventricular global hypokinesis.  · Normal right ventricular size with normal right ventricular systolic function.  · Grade I left ventricular diastolic dysfunction.  · The estimated PA systolic pressure is 33 mmHg.  · Normal central venous pressure (3 mmHg).  . Continue Beta Blocker and monitor clinical status closely. Monitor on telemetry.Monitor strict Is&Os and daily weights.  Place on fluid restriction of 1800ml. Cardiology has not been any consulted. Continue to stress to patient importance of self efficacy and  on diet for CHF. Last BNP reviewed- and noted below   Recent Labs   Lab 10/28/23  1603   *   .

## 2023-10-29 NOTE — ASSESSMENT & PLAN NOTE
-Patient with long standing alcohol dependence, reports multiple bouts of rehab programs, reporting a recent period of sobriety x  Mos.   _patient links his relapse to lack of xanax use.  Discussed with patient that will not restart PRN xanax, can monitor for alcohol withdrawal as per typical inpatient protocol. CIWA-AR monitoring and PRN symptom based ativan.      -He is taking Sertraline 150mg daily   -He is not using naltrexone prescribed as outpatient - restart now   -discuss details of his outpatient program.  He should f/u with Psychiatry regarding any initiation or use of BZD as adjunctive treatment for his anxiety,alcohol dependence

## 2023-10-29 NOTE — ASSESSMENT & PLAN NOTE
69 y/o male with PMHx significant for HFmrEF (last EF 45-50% 2022), longstanding persistent atrial fibrillation, alcohol dependence, hx of RCC who presented to the ED with complaints of dyspnea and cough. In the ED, found to have atrial fibrillation with RVR. He has more frequent AFib episodes when drinking. Recently relapsed after several years of sobriety. He was on flecainide prn regimen, eliquis (compliant), and he ran out of his metoprolol a few weeks ago. He is symptomatic when in AFib with SOB, anxiety, and REY.  He has not used flecainide in several months. He reports that his PRN xanax had run out and his anxiety was trigger for his relapse. He was offered PVI ablation with Dr. Giang in 2019 but ended up canceling as his symptoms were better controlled. He is tired of taking medication especially when he is asymptomatic.     Recommendations  -continue eliquis 5mg BID  -patient received flecainide 100mg overnight  -stop flecainide  -start multaq 400mg BID after 48hrs off flecainide (Tuesday morning)  -restart metoprolol succinate 25mg daily   -converted to sinus rhythm overnight  -recommend outpatient TTE and ischemic evaluation with stress test. If EF improved and negative stress test, other AAD would be options in the future  -follow up with Dr. Giang as he would like to discuss outpatient PVI again, message sent to Amy Johnson    Patient seen and discussed with EP fellow and EP staff. Please call with any questions.

## 2023-10-29 NOTE — ASSESSMENT & PLAN NOTE
Patient with Long standing persistent (>12 months) atrial fibrillation which is uncontrolled currently with Beta Blocker and Flecainide. Patient is currently in atrial fibrillation.  -patient is not regularly taking is beta blocker and using PRN flecainide, no use today.  Restart scheduled flecainide now, if patient converts to sinus rhythm, can change to PRN use   -Given multiple doses of metoprolol and HR trend is improving in 110-120s range, restart toprol XL  -patient's non-adherence to metoprolol and also relapse of Alcohol dependence with increased use likely contributing.     If above measures are not controlling pts HR - consult cardiology inpatient.   Also refer to cardiology as outpatient, last saw Dr. Tolentino approx 1 year prior    [id number] Score: 2.   Anticoagulation indicated. Anticoagulation done with apixaban.

## 2023-10-29 NOTE — ASSESSMENT & PLAN NOTE
"Patient's FSGs are controlled on current medication regimen.  Last A1c reviewed-   Lab Results   Component Value Date    HGBA1C 5.2 12/15/2022     Most recent fingerstick glucose reviewed- No results for input(s): "POCTGLUCOSE" in the last 24 hours.  Current correctional scale  Low  Maintain anti-hyperglycemic dose as follows-   Antihyperglycemics (From admission, onward)    Start     Stop Route Frequency Ordered    10/29/23 0645  insulin aspart U-100 pen 0-5 Units         -- SubQ 2 times daily before meals 10/28/23 2112        Hold Oral hypoglycemics while patient is in the hospital.  "

## 2023-10-29 NOTE — CONSULTS
"[facility] - Cardiology Stepdown  Cardiac Electrophysiology  Consult Note    Admission Date: 10/28/2023  Code Status: Full Code   Attending Provider: Dayna Bai MD  Consulting Provider: Jamel Crain MD  Principal Problem:Atrial fibrillation with RVR    Inpatient consult to Electrophysiology  Consult performed by: Jamel Crain MD  Consult ordered by: Dayna Bai MD        Subjective:     Chief Complaint:  Palpitations, REY, Atrial Fibrillation    HPI:   69 y/o male with PMHx significant for HFmrEF (last EF 45-50% 2022), longstanding persistent atrial fibrillation, alcohol dependence, hx of RCC who presented to the ED with complaints of dyspnea and cough.      He reports a 2 weeks history of cough and dyspnea on exertion. Reduced exercise tolerance, reporting difficulty walking more than 50 feet without stopping to catch his breath.     In the ED, found to have atrial fibrillation with RVR. He received 3 doses 5mg IV metoprolol and Lopressor 50mg at this time.  Previously seen by Dr. Tolentino and Dr. Giang for atrial fibrillation. He has more frequent AFib episodes when drinking. Recently relapsed after several years of sobriety. He was on flecainide prn regimen, eliquis (compliant), and he ran out of his metoprolol a few weeks ago. He is symptomatic when in AFib with SOB, anxiety, and REY.  He has not used flecainide in several months. He reports that his PRN xanax had run out and his anxiety was trigger for his relapse. He was offered PVI ablation with Dr. Giang in 2019 but ended up canceling as his symptoms were better controlled. He is tired of taking medication especially when he is asymptomatic. He would like to consider PVI again. Converted to sinus rhythm overnight.       Past Medical History:   Diagnosis Date    A-fib     Alcohol abuse     Anxiety     Arthritis     Chronic gout     Diabetes mellitus     DM (diabetes mellitus) 11/16/2016    "boarderline, not taking any meds currently"    " Fatty liver     Hyperlipidemia     Renal cell cancer 2014    S/P TKR (total knee replacement), right 6/27/2019     Past Surgical History:   Procedure Laterality Date    ABSCESS DRAINAGE      perirectal    COLONOSCOPY      HAND SURGERY Left     JOINT REPLACEMENT      knee replacement right knee    KIDNEY SURGERY      partial left kidney removal - [place]    PARTIAL NEPHRECTOMY Left August 2014    PERCUTANEOUS CRYOTHERAPY OF PERIPHERAL NERVE USING LIQUID NITROUS OXIDE IN CLOSED NEEDLE DEVICE Right 6/17/2019    Procedure: CRYOTHERAPY, NERVE, PERIPHERAL, PERCUTANEOUS, USING LIQUID NITROUS OXIDE IN CLOSED NEEDLE DEVICE-right knee iovera;  Surgeon: Donny Hair III, MD;  Location: [facility] CATH LAB;  Service: Pain Management;  Laterality: Right;    TOTAL KNEE ARTHROPLASTY Right 6/27/2019    Procedure: ARTHROPLASTY, KNEE, TOTAL-SAME DAY;  Surgeon: Mikael Huerta MD;  Location: [facility] OR [address] [place] [address];  Service: Orthopedics;  Laterality: Right;     Review of patient's allergies indicates:   Allergen Reactions    No known drug allergies      No current facility-administered medications on file prior to encounter.     Current Outpatient Medications on File Prior to Encounter   Medication Sig    allopurinoL (ZYLOPRIM) 100 MG tablet Take 2 tablets (200 mg total) by mouth once daily.    ALPRAZolam (XANAX) 0.5 MG tablet TAKE 1 TABLET(0.5 MG) BY MOUTH EVERY NIGHT AS NEEDED FOR ANXIETY    cyanocobalamin (VITAMIN B-12) 1000 MCG tablet Take 1 tablet (1,000 mcg total) by mouth once daily.    diclofenac sodium (VOLTAREN) 1 % Gel Apply 2 g topically once daily. (Patient taking differently: Apply 2 g topically 3 (three) times daily as needed (Right knee - hand pain).)    ELIQUIS 5 mg Tab TAKE 1 TABLET BY MOUTH TWICE DAILY (Patient taking differently: Take 5 mg by mouth 2 (two) times daily.)    flecainide (TAMBOCOR) 100 MG Tab TAKE 1 TABLET(100 MG) BY MOUTH EVERY 12 HOURS AS NEEDED (Patient taking differently: Take 100 mg by mouth every 12  (twelve) hours as needed (Atrial Fibrillation).)    folic acid (FOLVITE) 1 MG tablet Take 1 tablet (1 mg total) by mouth once daily.    ketoconazole (NIZORAL) 2 % cream Apply topically once daily. Apply to affected skin from toes to heels twice a day for 3-4 weeks.    magnesium oxide (MAG-OX) 400 mg (241.3 mg magnesium) tablet Take 1 tablet (400 mg total) by mouth once daily.    metFORMIN (GLUCOPHAGE-XR) 500 MG ER 24hr tablet Take 2 tablets (1,000 mg total) by mouth 2 (two) times daily with meals.    multivitamin (THERAGRAN) tablet Take 1 tablet by mouth once daily.    omega-3 acid ethyl esters (LOVAZA) 1 gram capsule Take 2 g by mouth 2 (two) times daily.    pantoprazole (PROTONIX) 40 MG tablet Take 1 tablet (40 mg total) by mouth once daily.    rOPINIRole (REQUIP) 1 MG tablet TAKE 1 TABLET(1 MG) BY MOUTH EVERY EVENING (Patient taking differently: Take 1 mg by mouth every evening.)    rosuvastatin (CRESTOR) 20 MG tablet TAKE 1 TABLET(20 MG) BY MOUTH EVERY DAY    sertraline (ZOLOFT) 100 MG tablet Take 150 mg by mouth every evening.    VITAMIN B-1 100 MG tablet TAKE 1 TABLET BY MOUTH ONCE DAILY    metoprolol succinate (TOPROL-XL) 25 MG 24 hr tablet TAKE 1 TABLET(25 MG) BY MOUTH EVERY DAY    naltrexone (DEPADE) 50 mg tablet Take 50 mg by mouth once daily. (Patient not taking: Reported on 6/19/2023)    tamsulosin (FLOMAX) 0.4 mg Cap TAKE 1 CAPSULE(0.4 MG) BY MOUTH EVERY DAY (Patient taking differently: Take 0.4 mg by mouth every evening.)    venlafaxine (EFFEXOR-XR) 75 MG 24 hr capsule Take 1 capsule (75 mg total) by mouth once daily.     Family History       Problem Relation (Age of Onset)    Alcohol abuse Brother    Alzheimer's disease Mother, Brother    Cancer Father, Sister    Colon cancer Father    Colon polyps Brother    Diabetes Mother    Lung cancer Father, Sister          Tobacco Use    Smoking status: Never    Smokeless tobacco: Never   Substance and Sexual Activity    Alcohol use: Yes     Alcohol/week: 84.0  standard drinks of alcohol     Types: 84 Cans of beer per week     Comment: 12 beers daily    Drug use: No    Sexual activity: Not on file     Review of Systems   Constitutional: Negative for chills, diaphoresis and night sweats.   Cardiovascular:  Positive for dyspnea on exertion, irregular heartbeat and palpitations. Negative for chest pain, leg swelling, near-syncope, orthopnea and syncope.   Respiratory:  Negative for cough, shortness of breath and wheezing.    Skin:  Negative for color change and dry skin.   Musculoskeletal:  Negative for joint pain and joint swelling.   Gastrointestinal:  Negative for abdominal pain, diarrhea, nausea and vomiting.   Genitourinary:  Negative for dysuria.   Neurological:  Negative for dizziness, headaches, light-headedness and weakness.   All other systems reviewed and are negative.    Objective:     Vital Signs (Most Recent):  Temp: 97.5 °F (36.4 °C) (10/29/23 0823)  Pulse: 65 (10/29/23 0823)  Resp: 20 (10/29/23 0823)  BP: 120/66 (10/29/23 0823)  SpO2: 99 % (10/29/23 0823) Vital Signs (24h Range):  Temp:  [96.6 °F (35.9 °C)-98.1 °F (36.7 °C)] 97.5 °F (36.4 °C)  Pulse:  [] 65  Resp:  [14-40] 20  SpO2:  [97 %-99 %] 99 %  BP: (104-151)/(58-95) 120/66     Weight: 84 kg (185 lb 3 oz)  Body mass index is 29.89 kg/m².    Physical Exam  Constitutional:       Appearance: He is well-developed. He is not diaphoretic.   HENT:      Head: Normocephalic and atraumatic.   Eyes:      General: No scleral icterus.     Conjunctiva/sclera: Conjunctivae normal.      Pupils: Pupils are equal, round, and reactive to light.   Neck:      Vascular: No JVD.   Cardiovascular:      Rate and Rhythm: Normal rate and regular rhythm.      Pulses: Intact distal pulses.      Heart sounds: Normal heart sounds. No murmur heard.     No friction rub. No gallop.   Pulmonary:      Effort: Pulmonary effort is normal.      Breath sounds: No wheezing or rales.   Abdominal:      General: Bowel sounds are normal.  There is no distension.      Tenderness: There is no abdominal tenderness.   Musculoskeletal:      Right lower leg: No edema.      Left lower leg: No edema.   Skin:     General: Skin is warm.      Capillary Refill: Capillary refill takes less than 2 seconds.      Findings: No erythema or rash.   Neurological:      General: No focal deficit present.      Mental Status: He is alert and oriented to person, place, and time.   Psychiatric:         Thought Content: Thought content normal.         Judgment: Judgment normal.     Significant Labs: EP:   Recent Labs   Lab 10/28/23  1603 10/28/23  1747 10/28/23  1839 10/29/23  0609   NA  --   --  135* 132*   K  --   --  4.5 3.8   CL  --   --  100 98   CO2  --   --  22* 20*   GLU  --   --  163* 171*   BUN  --   --  11 15   CREATININE  --   --  1.2 1.2   CALCIUM  --   --  9.9 9.5   PROT  --   --  7.6  --    ALBUMIN  --   --  4.1 3.9   BILITOT  --   --  0.8  --    ALKPHOS  --   --  83  --    AST  --   --  32  --    ALT  --   --  23  --    ANIONGAP  --   --  13 14   WBC 11.26  --   --  9.03   HGB 16.8  --   --  13.8*   HCT 50.0   < >  --  41.9     --   --  184   INR 1.0  --   --   --     < > = values in this interval not displayed.     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 In the ED, found to have atrial fibrillation with RVR. He has more frequent AFib episodes when drinking. Recently relapsed after several years of sobriety. He was on flecainide prn regimen, eliquis (compliant), and he ran out of  his metoprolol a few weeks ago. He is symptomatic when in AFib with SOB, anxiety, and REY.  He has not used flecainide in several months. He reports that his PRN xanax had run out and his anxiety was trigger for his relapse. He was offered PVI ablation with Dr. Giang in 2019 but ended up canceling as his symptoms were better controlled. He is tired of taking medication especially when he is asymptomatic.     Recommendations  -continue eliquis 5mg BID  -patient received flecainide 100mg overnight  -stop flecainide  -start multaq 400mg BID after 48hrs off flecainide (Tuesday morning)  -restart metoprolol succinate 25mg daily   -converted to sinus rhythm overnight  -recommend outpatient TTE and ischemic evaluation with stress test. If EF improved and negative stress test, other AAD would be options in the future  -follow up with Dr. Giang as he would like to discuss outpatient PVI again, message sent to Amy Johnson    Patient seen and discussed with EP fellow and EP staff. Please call with any questions.        Thank you for your consult. I will follow-up with patient. Please contact us if you have any additional questions. Patient is stable for discharge from EP perspective    Jamel Crain MD  Cardiac Electrophysiology  [facility] - Cardiology Stepdown

## 2023-10-30 ENCOUNTER — PATIENT MESSAGE (OUTPATIENT)
Dept: ADMINISTRATIVE | Facility: CLINIC | Age: 68
End: 2023-10-30
Payer: MEDICARE

## 2023-10-30 ENCOUNTER — PATIENT OUTREACH (OUTPATIENT)
Dept: ADMINISTRATIVE | Facility: HOSPITAL | Age: 68
End: 2023-10-30
Payer: MEDICARE

## 2023-10-30 ENCOUNTER — PATIENT OUTREACH (OUTPATIENT)
Dept: ADMINISTRATIVE | Facility: CLINIC | Age: 68
End: 2023-10-30
Payer: MEDICARE

## 2023-10-30 NOTE — PROGRESS NOTES
Health Maintenance Due   Topic Date Due    RSV Vaccine (Age 60+) (1 - 1-dose 60+ series) Never done    Pneumococcal Vaccines (Age 65+) (2 - PCV) 10/21/2017    Diabetes Urine Screening  10/17/2020    Eye Exam  05/10/2022    Colorectal Cancer Screening  06/22/2022    Influenza Vaccine (1) 09/01/2023    COVID-19 Vaccine (4 - 2023-24 season) 09/01/2023    Lipid Panel  11/11/2023        updated. Triggered LINKS and Care everywhere. Chart reviewed.     Ana Carreno LPN   Clinical Care Coordinator  Primary Care and Wellness

## 2023-10-30 NOTE — PROGRESS NOTES
C3 nurse attempted to contact Donald Warren Abadie for a TCC post hospital discharge follow up call. No answer. Left voicemail with callback information. The patient does not have a scheduled HOSFU appointment. Message sent to PCP staff for assistance with scheduling visit with patient.

## 2023-10-30 NOTE — PROGRESS NOTES
C3 nurse spoke with Donald Warren Abadie for a TCC post hospital discharge follow up call. The patient does not have a scheduled HOSFU appointment with Bacilio Laryr MD within 5-7 days post hospital discharge date 10/29/23. C3 nurse was unable to schedule HOSFU appointment in [facility].    Message previously sent to PCP staff requesting they contact patient and schedule follow up appointment.

## 2023-10-31 ENCOUNTER — TELEPHONE (OUTPATIENT)
Dept: INTERNAL MEDICINE | Facility: CLINIC | Age: 68
End: 2023-10-31
Payer: MEDICARE

## 2023-10-31 ENCOUNTER — TELEPHONE (OUTPATIENT)
Dept: NEUROLOGY | Facility: CLINIC | Age: 68
End: 2023-10-31
Payer: MEDICARE

## 2023-10-31 NOTE — TELEPHONE ENCOUNTER
----- Message from Bessy Chavez sent at 10/31/2023  9:23 AM CDT -----  Contact: 10:00 am appt - today  .Type:  Needs Medical Advice    Who Called:  pt's daughter Chani   Symptoms (please be specific):    How long has patient had these symptoms:    Pharmacy name and phone #:    Would the patient rather a call back or a response via [facility]?   Best Call Back Number: [phone]  Additional Information: would like to speak to the office about dad but not in front of him. They have a 10:00 am appt today

## 2023-11-01 DIAGNOSIS — E11.9 TYPE 2 DIABETES MELLITUS WITHOUT COMPLICATION, UNSPECIFIED WHETHER LONG TERM INSULIN USE: ICD-10-CM

## 2023-11-06 ENCOUNTER — PATIENT MESSAGE (OUTPATIENT)
Dept: ADMINISTRATIVE | Facility: HOSPITAL | Age: 68
End: 2023-11-06
Payer: MEDICARE

## 2023-11-13 ENCOUNTER — TELEPHONE (OUTPATIENT)
Dept: INTERNAL MEDICINE | Facility: CLINIC | Age: 68
End: 2023-11-13
Payer: MEDICARE

## 2023-11-13 NOTE — TELEPHONE ENCOUNTER
----- Message from Marcelle Lee sent at 11/13/2023  8:44 AM CST -----  Contact: [phone]  Patient called, would like to know why appointment for today was cancel, and is requesting a call from Dr Larry's office. Please call and advise. Thank you.

## 2023-11-16 ENCOUNTER — TELEPHONE (OUTPATIENT)
Dept: INTERNAL MEDICINE | Facility: CLINIC | Age: 68
End: 2023-11-16
Payer: MEDICARE

## 2023-11-16 NOTE — TELEPHONE ENCOUNTER
----- Message from Magda Hunt sent at 11/16/2023  3:11 PM CST -----  Contact: [phone]  Patient is returning a phone call.  Who left a message for the patient: Chioma Chew MA  Does patient know what this is regarding:  yes   Would you like a call back, or a response through your [facility] portal?:   call back   Comments:

## 2023-11-16 NOTE — TELEPHONE ENCOUNTER
----- Message from Mk Delvalle sent at 11/16/2023 12:52 PM CST -----  Contact: [phone]@patient  Good afternoon patient would like a call back to discuss the doc calling him in something for his anxiety. Please give patient a call back [phone]

## 2023-11-16 NOTE — TELEPHONE ENCOUNTER
Spoke with pt, he is requesting another prescription of xanax but with an increase dosage. Scheduled appt

## 2023-11-17 ENCOUNTER — OFFICE VISIT (OUTPATIENT)
Dept: INTERNAL MEDICINE | Facility: CLINIC | Age: 68
End: 2023-11-17
Payer: MEDICARE

## 2023-11-17 VITALS
SYSTOLIC BLOOD PRESSURE: 126 MMHG | BODY MASS INDEX: 29.9 KG/M2 | WEIGHT: 186.06 LBS | HEART RATE: 80 BPM | OXYGEN SATURATION: 98 % | HEIGHT: 66 IN | DIASTOLIC BLOOD PRESSURE: 74 MMHG

## 2023-11-17 DIAGNOSIS — I48.91 ATRIAL FIBRILLATION, UNSPECIFIED TYPE: ICD-10-CM

## 2023-11-17 DIAGNOSIS — F10.10 ALCOHOL ABUSE: Primary | ICD-10-CM

## 2023-11-17 DIAGNOSIS — F41.9 ANXIETY: ICD-10-CM

## 2023-11-17 PROCEDURE — 1126F PR PAIN SEVERITY QUANTIFIED, NO PAIN PRESENT: ICD-10-PCS | Mod: CPTII,S$GLB,, | Performed by: INTERNAL MEDICINE

## 2023-11-17 PROCEDURE — 3074F SYST BP LT 130 MM HG: CPT | Mod: CPTII,S$GLB,, | Performed by: INTERNAL MEDICINE

## 2023-11-17 PROCEDURE — 3008F BODY MASS INDEX DOCD: CPT | Mod: CPTII,S$GLB,, | Performed by: INTERNAL MEDICINE

## 2023-11-17 PROCEDURE — 1101F PT FALLS ASSESS-DOCD LE1/YR: CPT | Mod: CPTII,S$GLB,, | Performed by: INTERNAL MEDICINE

## 2023-11-17 PROCEDURE — 3052F PR MOST RECENT HEMOGLOBIN A1C LEVEL 8.0 - < 9.0%: ICD-10-PCS | Mod: CPTII,S$GLB,, | Performed by: INTERNAL MEDICINE

## 2023-11-17 PROCEDURE — 3074F PR MOST RECENT SYSTOLIC BLOOD PRESSURE < 130 MM HG: ICD-10-PCS | Mod: CPTII,S$GLB,, | Performed by: INTERNAL MEDICINE

## 2023-11-17 PROCEDURE — 3288F PR FALLS RISK ASSESSMENT DOCUMENTED: ICD-10-PCS | Mod: CPTII,S$GLB,, | Performed by: INTERNAL MEDICINE

## 2023-11-17 PROCEDURE — 3288F FALL RISK ASSESSMENT DOCD: CPT | Mod: CPTII,S$GLB,, | Performed by: INTERNAL MEDICINE

## 2023-11-17 PROCEDURE — 1159F PR MEDICATION LIST DOCUMENTED IN MEDICAL RECORD: ICD-10-PCS | Mod: CPTII,S$GLB,, | Performed by: INTERNAL MEDICINE

## 2023-11-17 PROCEDURE — 99215 OFFICE O/P EST HI 40 MIN: CPT | Mod: S$GLB,,, | Performed by: INTERNAL MEDICINE

## 2023-11-17 PROCEDURE — 99215 PR OFFICE/OUTPT VISIT, EST, LEVL V, 40-54 MIN: ICD-10-PCS | Mod: S$GLB,,, | Performed by: INTERNAL MEDICINE

## 2023-11-17 PROCEDURE — 99499 UNLISTED E&M SERVICE: CPT | Mod: S$GLB,,, | Performed by: INTERNAL MEDICINE

## 2023-11-17 PROCEDURE — 3052F HG A1C>EQUAL 8.0%<EQUAL 9.0%: CPT | Mod: CPTII,S$GLB,, | Performed by: INTERNAL MEDICINE

## 2023-11-17 PROCEDURE — 99999 PR PBB SHADOW E&M-EST. PATIENT-LVL V: ICD-10-PCS | Mod: PBBFAC,,, | Performed by: INTERNAL MEDICINE

## 2023-11-17 PROCEDURE — 1126F AMNT PAIN NOTED NONE PRSNT: CPT | Mod: CPTII,S$GLB,, | Performed by: INTERNAL MEDICINE

## 2023-11-17 PROCEDURE — 3078F DIAST BP <80 MM HG: CPT | Mod: CPTII,S$GLB,, | Performed by: INTERNAL MEDICINE

## 2023-11-17 PROCEDURE — 3078F PR MOST RECENT DIASTOLIC BLOOD PRESSURE < 80 MM HG: ICD-10-PCS | Mod: CPTII,S$GLB,, | Performed by: INTERNAL MEDICINE

## 2023-11-17 PROCEDURE — 1101F PR PT FALLS ASSESS DOC 0-1 FALLS W/OUT INJ PAST YR: ICD-10-PCS | Mod: CPTII,S$GLB,, | Performed by: INTERNAL MEDICINE

## 2023-11-17 PROCEDURE — 1159F MED LIST DOCD IN RCRD: CPT | Mod: CPTII,S$GLB,, | Performed by: INTERNAL MEDICINE

## 2023-11-17 PROCEDURE — 3008F PR BODY MASS INDEX (BMI) DOCUMENTED: ICD-10-PCS | Mod: CPTII,S$GLB,, | Performed by: INTERNAL MEDICINE

## 2023-11-17 PROCEDURE — 99999 PR PBB SHADOW E&M-EST. PATIENT-LVL V: CPT | Mod: PBBFAC,,, | Performed by: INTERNAL MEDICINE

## 2023-11-17 RX ORDER — SERTRALINE HYDROCHLORIDE 50 MG/1
50 TABLET, FILM COATED ORAL DAILY
Qty: 30 TABLET | Refills: 11 | Status: SHIPPED | OUTPATIENT
Start: 2023-11-17 | End: 2023-12-22 | Stop reason: SDUPTHER

## 2023-11-17 RX ORDER — ALPRAZOLAM 0.5 MG/1
0.5 TABLET ORAL 3 TIMES DAILY
Qty: 30 TABLET | Refills: 0 | Status: SHIPPED | OUTPATIENT
Start: 2023-11-17 | End: 2023-12-22 | Stop reason: SDUPTHER

## 2023-11-17 NOTE — PROGRESS NOTES
CC:  Follow-up     HPI:  The patient is a 68-year-old male with atrial fibrillation, EtOH abuse, hypertension, hyperlipidemia, elevated triglycerides, type 2 diabetes, reflux, gout, renal cell cancer status post nephrectomy and anxiety who presents today for follow-up.  The patient reports being admitted to [facility] 2-3 months ago for alcohol abuse.  He reports being alcohol free the past several months.  He was admitted to observation the 28th of October for evaluation of dyspnea and cough.  He was found to be in atrial fibrillation.  It was reported he relapsed and was drinking every other day.  The patient was treated with IV metoprolol.  He was restarted on metoprolol 25 mg once a day and Multaq was started.  Patient presents today with complaints of extreme fatigue with sertraline 100 mg a day.  He has been taking this medication in the morning and states that it makes him sleepy.  He denies any alcohol use at this time.    ROS:  Patient reports no chest pain.  No shortness of breath.  Does of anxiety.  He is agreeable to go back to Psychiatry.    Physical exam:   General appearance:  No acute distress   HEENT: Conjunctiva is clear.  Pupils were equal.  TMs were clear.  Nasal septum is midline without discharge.  Oropharynx is without erythema.  Trachea is midline without JVD.    Pulmonary:  Good inspiratory, expiratory breath sounds are heard.  Lungs are clear to auscultation.    Cardiovascular:  S1-S2, rhythm appears to be regular.  Extremities without edema.    GI: Abdomen is nontender, nondistended without hepatosplenomegaly   Comments:  It was discussed with the patient that the trigger for his AFib inches alcohol.  His also discussed with him I would refill his Xanax be used only as a as needed basis in the he can not drink while using this medication.  Also discussed with him that we will restart his Zoloft; but, at 50 mg to be taken at night and not during the day.  The patient is agreeable to  follow-up with Psychiatry    Assessment:  1.  Alcohol abuse   2.  AFib  3. Type 2 diabetes  4. Hypertension   5. Hyperlipidemia with elevated triglycerides    Plan:   1. Refer the patient to addiction psychiatry   2. Will restart Zoloft 50 mg once a day taken at night  3.  Will refill Xanax 0.5 mg  4.  The patient has follow-up in 4-6 weeks.

## 2023-11-20 ENCOUNTER — PATIENT OUTREACH (OUTPATIENT)
Dept: ADMINISTRATIVE | Facility: HOSPITAL | Age: 68
End: 2023-11-20
Payer: MEDICARE

## 2023-11-20 NOTE — PROGRESS NOTES
Chart reviewed for [employer] Report.    Ana Carreno LPN   Clinical Care Coordinator  Primary Care and Wellness

## 2023-11-21 ENCOUNTER — PATIENT OUTREACH (OUTPATIENT)
Dept: ADMINISTRATIVE | Facility: HOSPITAL | Age: 68
End: 2023-11-21
Payer: MEDICARE

## 2023-11-21 DIAGNOSIS — Z12.12 ENCOUNTER FOR COLORECTAL CANCER SCREENING: Primary | ICD-10-CM

## 2023-11-21 DIAGNOSIS — Z12.11 ENCOUNTER FOR COLORECTAL CANCER SCREENING: Primary | ICD-10-CM

## 2023-11-21 NOTE — PROGRESS NOTES
Health Maintenance Due   Topic Date Due    RSV Vaccine (Age 60+ and Pregnant patients) (1 - 1-dose 60+ series) Never done    Pneumococcal Vaccines (Age 65+) (2 - PCV) 10/21/2017    Diabetes Urine Screening  10/17/2020    Eye Exam  05/10/2022    Colorectal Cancer Screening  06/22/2022    Influenza Vaccine (1) 09/01/2023    COVID-19 Vaccine (4 - 2023-24 season) 09/01/2023    Lipid Panel  11/11/2023       Chart reviewed for [employer]. Attestation sent.    Ana Carreno LPN   Clinical Care Coordinator  Primary Care and Wellness

## 2023-11-21 NOTE — PROGRESS NOTES
Placed referral for colonoscopy    Ana Carreno LPN   Clinical Care Coordinator  Primary Care and Wellness

## 2023-11-22 ENCOUNTER — TELEPHONE (OUTPATIENT)
Dept: INTERNAL MEDICINE | Facility: CLINIC | Age: 68
End: 2023-11-22
Payer: MEDICARE

## 2023-11-22 NOTE — TELEPHONE ENCOUNTER
Pt is requesting a referral for the bump on his back. He states he spoke with Dr Larry about this previously but he's ready to get it removed.     Please review and respond,  Thank You.

## 2023-11-22 NOTE — TELEPHONE ENCOUNTER
----- Message from Beatriz Perez sent at 11/22/2023  1:53 PM CST -----  Regarding: referral request  Good afternoon, I called patient to schedule his colonoscopy pre admit appointment - he requested I send a message to Dr Larry to request for a referral for the bump on his back. He states he spoke with Dr Larry about this previously but he's ready to get it removed.     Patient: Donald Abadie   [phone]

## 2023-11-30 ENCOUNTER — TELEPHONE (OUTPATIENT)
Dept: ENDOSCOPY | Facility: HOSPITAL | Age: 68
End: 2023-11-30

## 2023-11-30 ENCOUNTER — PATIENT MESSAGE (OUTPATIENT)
Dept: ENDOSCOPY | Facility: HOSPITAL | Age: 68
End: 2023-11-30

## 2023-11-30 NOTE — TELEPHONE ENCOUNTER
Tried contacting patient to schedule colonoscopy. The patient did not answer the call.  Left voice message  requesting a call back at [phone] to get procedure scheduled.

## 2023-12-04 DIAGNOSIS — F10.10 ALCOHOL ABUSE: Primary | ICD-10-CM

## 2023-12-07 ENCOUNTER — TELEPHONE (OUTPATIENT)
Dept: INTERNAL MEDICINE | Facility: CLINIC | Age: 68
End: 2023-12-07
Payer: MEDICARE

## 2023-12-07 NOTE — TELEPHONE ENCOUNTER
Called patient to check to see if had their DM eye exam this year, states has not.   Offered the DM eyecam while here to see PCP at visit and he agrees.   Appt set  Ludmila Clemons RN HC

## 2023-12-08 ENCOUNTER — PATIENT OUTREACH (OUTPATIENT)
Dept: ADMINISTRATIVE | Facility: HOSPITAL | Age: 68
End: 2023-12-08
Payer: MEDICARE

## 2023-12-08 NOTE — PROGRESS NOTES

## 2023-12-22 ENCOUNTER — LAB VISIT (OUTPATIENT)
Dept: LAB | Facility: HOSPITAL | Age: 68
End: 2023-12-22
Attending: INTERNAL MEDICINE
Payer: MEDICARE

## 2023-12-22 ENCOUNTER — CLINICAL SUPPORT (OUTPATIENT)
Dept: INTERNAL MEDICINE | Facility: CLINIC | Age: 68
End: 2023-12-22
Attending: INTERNAL MEDICINE
Payer: MEDICARE

## 2023-12-22 ENCOUNTER — OFFICE VISIT (OUTPATIENT)
Dept: INTERNAL MEDICINE | Facility: CLINIC | Age: 68
End: 2023-12-22
Payer: MEDICARE

## 2023-12-22 VITALS
BODY MASS INDEX: 29.87 KG/M2 | OXYGEN SATURATION: 98 % | SYSTOLIC BLOOD PRESSURE: 126 MMHG | DIASTOLIC BLOOD PRESSURE: 72 MMHG | HEIGHT: 66 IN | WEIGHT: 185.88 LBS | HEART RATE: 72 BPM

## 2023-12-22 DIAGNOSIS — Z51.81 ENCOUNTER FOR MEDICATION MONITORING: ICD-10-CM

## 2023-12-22 DIAGNOSIS — K76.0 FATTY LIVER: ICD-10-CM

## 2023-12-22 DIAGNOSIS — E11.9 TYPE 2 DIABETES MELLITUS WITHOUT COMPLICATION, WITHOUT LONG-TERM CURRENT USE OF INSULIN: ICD-10-CM

## 2023-12-22 DIAGNOSIS — D17.9 LIPOMA, UNSPECIFIED SITE: ICD-10-CM

## 2023-12-22 DIAGNOSIS — E11.9 TYPE 2 DIABETES MELLITUS WITHOUT COMPLICATION, UNSPECIFIED WHETHER LONG TERM INSULIN USE: ICD-10-CM

## 2023-12-22 DIAGNOSIS — F41.9 ANXIETY: ICD-10-CM

## 2023-12-22 DIAGNOSIS — F10.10 ALCOHOL ABUSE: Primary | ICD-10-CM

## 2023-12-22 DIAGNOSIS — E11.9 TYPE 2 DIABETES MELLITUS WITHOUT COMPLICATION: ICD-10-CM

## 2023-12-22 DIAGNOSIS — I48.91 ATRIAL FIBRILLATION, UNSPECIFIED TYPE: ICD-10-CM

## 2023-12-22 LAB
ALBUMIN/CREAT UR: 85 UG/MG (ref 0–30)
CREAT UR-MCNC: 40 MG/DL (ref 23–375)
MICROALBUMIN UR DL<=1MG/L-MCNC: 34 UG/ML

## 2023-12-22 PROCEDURE — 99999 PR PBB SHADOW E&M-EST. PATIENT-LVL V: ICD-10-PCS | Mod: PBBFAC,,, | Performed by: INTERNAL MEDICINE

## 2023-12-22 PROCEDURE — 3074F SYST BP LT 130 MM HG: CPT | Mod: CPTII,S$GLB,, | Performed by: INTERNAL MEDICINE

## 2023-12-22 PROCEDURE — 92228 DIABETIC EYE SCREENING PHOTO: ICD-10-PCS | Mod: 26,S$GLB,, | Performed by: OPTOMETRIST

## 2023-12-22 PROCEDURE — 3074F PR MOST RECENT SYSTOLIC BLOOD PRESSURE < 130 MM HG: ICD-10-PCS | Mod: CPTII,S$GLB,, | Performed by: INTERNAL MEDICINE

## 2023-12-22 PROCEDURE — 1101F PR PT FALLS ASSESS DOC 0-1 FALLS W/OUT INJ PAST YR: ICD-10-PCS | Mod: CPTII,S$GLB,, | Performed by: INTERNAL MEDICINE

## 2023-12-22 PROCEDURE — 3052F PR MOST RECENT HEMOGLOBIN A1C LEVEL 8.0 - < 9.0%: ICD-10-PCS | Mod: CPTII,S$GLB,, | Performed by: INTERNAL MEDICINE

## 2023-12-22 PROCEDURE — 3066F PR DOCUMENTATION OF TREATMENT FOR NEPHROPATHY: ICD-10-PCS | Mod: CPTII,S$GLB,, | Performed by: INTERNAL MEDICINE

## 2023-12-22 PROCEDURE — 1159F MED LIST DOCD IN RCRD: CPT | Mod: CPTII,S$GLB,, | Performed by: INTERNAL MEDICINE

## 2023-12-22 PROCEDURE — 1101F PT FALLS ASSESS-DOCD LE1/YR: CPT | Mod: CPTII,S$GLB,, | Performed by: INTERNAL MEDICINE

## 2023-12-22 PROCEDURE — 3078F DIAST BP <80 MM HG: CPT | Mod: CPTII,S$GLB,, | Performed by: INTERNAL MEDICINE

## 2023-12-22 PROCEDURE — 80307 DRUG TEST PRSMV CHEM ANLYZR: CPT | Performed by: INTERNAL MEDICINE

## 2023-12-22 PROCEDURE — 1159F PR MEDICATION LIST DOCUMENTED IN MEDICAL RECORD: ICD-10-PCS | Mod: CPTII,S$GLB,, | Performed by: INTERNAL MEDICINE

## 2023-12-22 PROCEDURE — 3288F FALL RISK ASSESSMENT DOCD: CPT | Mod: CPTII,S$GLB,, | Performed by: INTERNAL MEDICINE

## 2023-12-22 PROCEDURE — 3008F BODY MASS INDEX DOCD: CPT | Mod: CPTII,S$GLB,, | Performed by: INTERNAL MEDICINE

## 2023-12-22 PROCEDURE — 2025F 7 FLD RTA PHOTO W/O RTNOPTHY: CPT | Mod: CPTII,S$GLB,, | Performed by: OPTOMETRIST

## 2023-12-22 PROCEDURE — 99214 OFFICE O/P EST MOD 30 MIN: CPT | Mod: S$GLB,,, | Performed by: INTERNAL MEDICINE

## 2023-12-22 PROCEDURE — 92228 IMG RTA DETC/MNTR DS PHY/QHP: CPT | Mod: 26,S$GLB,, | Performed by: OPTOMETRIST

## 2023-12-22 PROCEDURE — 3060F POS MICROALBUMINURIA REV: CPT | Mod: CPTII,S$GLB,, | Performed by: INTERNAL MEDICINE

## 2023-12-22 PROCEDURE — 82043 UR ALBUMIN QUANTITATIVE: CPT | Performed by: INTERNAL MEDICINE

## 2023-12-22 PROCEDURE — 99999 PR PBB SHADOW E&M-EST. PATIENT-LVL V: CPT | Mod: PBBFAC,,, | Performed by: INTERNAL MEDICINE

## 2023-12-22 PROCEDURE — 2025F DIABETIC EYE SCREENING PHOTO: ICD-10-PCS | Mod: CPTII,S$GLB,, | Performed by: OPTOMETRIST

## 2023-12-22 PROCEDURE — 3288F PR FALLS RISK ASSESSMENT DOCUMENTED: ICD-10-PCS | Mod: CPTII,S$GLB,, | Performed by: INTERNAL MEDICINE

## 2023-12-22 PROCEDURE — 99499 UNLISTED E&M SERVICE: CPT | Mod: S$GLB,,, | Performed by: INTERNAL MEDICINE

## 2023-12-22 PROCEDURE — 92228 DIABETIC EYE SCREENING PHOTO: ICD-10-PCS | Mod: TC,S$GLB,, | Performed by: INTERNAL MEDICINE

## 2023-12-22 PROCEDURE — 92228 IMG RTA DETC/MNTR DS PHY/QHP: CPT | Mod: TC,S$GLB,, | Performed by: INTERNAL MEDICINE

## 2023-12-22 PROCEDURE — 99214 PR OFFICE/OUTPT VISIT, EST, LEVL IV, 30-39 MIN: ICD-10-PCS | Mod: S$GLB,,, | Performed by: INTERNAL MEDICINE

## 2023-12-22 PROCEDURE — 3060F PR POS MICROALBUMINURIA RESULT DOCUMENTED/REVIEW: ICD-10-PCS | Mod: CPTII,S$GLB,, | Performed by: INTERNAL MEDICINE

## 2023-12-22 PROCEDURE — 3066F NEPHROPATHY DOC TX: CPT | Mod: CPTII,S$GLB,, | Performed by: INTERNAL MEDICINE

## 2023-12-22 PROCEDURE — 3078F PR MOST RECENT DIASTOLIC BLOOD PRESSURE < 80 MM HG: ICD-10-PCS | Mod: CPTII,S$GLB,, | Performed by: INTERNAL MEDICINE

## 2023-12-22 PROCEDURE — 3008F PR BODY MASS INDEX (BMI) DOCUMENTED: ICD-10-PCS | Mod: CPTII,S$GLB,, | Performed by: INTERNAL MEDICINE

## 2023-12-22 PROCEDURE — 3052F HG A1C>EQUAL 8.0%<EQUAL 9.0%: CPT | Mod: CPTII,S$GLB,, | Performed by: INTERNAL MEDICINE

## 2023-12-22 PROCEDURE — 80326 AMPHETAMINES 5 OR MORE: CPT | Performed by: INTERNAL MEDICINE

## 2023-12-22 PROCEDURE — 1126F PR PAIN SEVERITY QUANTIFIED, NO PAIN PRESENT: ICD-10-PCS | Mod: CPTII,S$GLB,, | Performed by: INTERNAL MEDICINE

## 2023-12-22 PROCEDURE — 1126F AMNT PAIN NOTED NONE PRSNT: CPT | Mod: CPTII,S$GLB,, | Performed by: INTERNAL MEDICINE

## 2023-12-22 RX ORDER — ALPRAZOLAM 0.5 MG/1
0.5 TABLET ORAL 3 TIMES DAILY PRN
Qty: 30 TABLET | Refills: 0 | Status: SHIPPED | OUTPATIENT
Start: 2023-12-22 | End: 2024-01-30

## 2023-12-22 RX ORDER — SERTRALINE HYDROCHLORIDE 50 MG/1
50 TABLET, FILM COATED ORAL DAILY
Qty: 30 TABLET | Refills: 11 | Status: SHIPPED | OUTPATIENT
Start: 2023-12-22 | End: 2024-12-21

## 2023-12-22 NOTE — PROGRESS NOTES
Donald Warren Abadie is a 68 y.o. male here for a diabetic eye screening with non-dilated fundus photos per Dr Larry.    Patient cooperative?: Yes  Small pupils?: No  Last eye exam: 5/10/2021    For exam results, see Encounter Report.

## 2023-12-23 NOTE — PROGRESS NOTES
CC:  Follow-up    HPI:  The patient is a 68-year-old male with atrial fibrillation, EtOH abuse, hypertension, hyperlipidemia, elevated triglycerides, type 2 diabetes, reflux, gout, renal cell cancer status post nephrectomy and anxiety who presents today follow-up anxiety as well as alcohol use.  He attributes restarting alcohol to anxiety.  He was prescribed sertraline; but, this medication made him too sedated.  The patient restarted on sertraline 50 mg q.p.m..  He was also referred to addiction Psychiatry.  He has not schedule yet but is willing to go.  He reports compliance with the medication.  He does have Xanax episodes of anxiety.  He does report decreased use of this medication.  He does reports still drinking 3-4 beers on occasion.    ROS:  Patient reports compliance with medications for his AFib.  He does complain of itching centered around a lipoma on his back.  He would like to have this removed.    Physical exam:   General appearance:  No acute distress  HEENT:  Trachea is midline without JVD  Pulmonary:  Good inspiratory, expiratory breath sounds are heard.  Lungs are clear to auscultation.  Cardiovascular:  S1-S2, rhythm is regular.  Extremities without edema.    GI:  Abdomen was nontender, nondistended without hepatosplenomegaly  Derm:  The patient has a large in the upper back.  This was ultrasounded in the past.  Comments: In regards to the lipoma, it was discussed with the patient that this has something that can not be drained and has to be surgically removed.  It would mean a referral to General surgery.  He is in agreement to this.  It was also discussed with him that alcohol consumption causes atrial fibrillation.  It was discussed that he needs to stop alcohol completely.  He brings up at this time that he has been drinking beer routinely since the age of 14 or 15.  He has been a major part of his life.  All family members drank except his parents.  He is agreeable to see addiction  Psychiatry.    Assessment:  1. Continuing use of alcohol   2. AFib   3. Anxiety    4. Fatty liver   5. Type 2 diabetes    Plan:  1. Will order a urine drug screen  2.  We will refer the patient to general surgery for the lipoma  3.  The patient already has a referral in for addiction Psychiatry

## 2023-12-26 ENCOUNTER — TELEPHONE (OUTPATIENT)
Dept: INTERNAL MEDICINE | Facility: CLINIC | Age: 68
End: 2023-12-26
Payer: MEDICARE

## 2023-12-26 LAB
6MAM UR QL: NOT DETECTED
7AMINOCLONAZEPAM UR QL: NOT DETECTED
A-OH ALPRAZ UR QL: PRESENT
ALPHA-OH-MIDAZOLAM: NOT DETECTED
ALPRAZ UR QL: NOT DETECTED
AMPHET UR QL SCN: NOT DETECTED
ANNOTATION COMMENT IMP: NORMAL
BARBITURATES UR QL: NEGATIVE
BUPRENORPHINE UR QL: NOT DETECTED
BZE UR QL: NEGATIVE
CARBOXYTHC UR QL: NORMAL
CARISOPRODOL UR QL: NEGATIVE
CLONAZEPAM UR QL: NOT DETECTED
CODEINE UR QL: NOT DETECTED
CREAT UR-MCNC: 43.4 MG/DL (ref 20–400)
DIAZEPAM UR QL: NOT DETECTED
ETHYL GLUCURONIDE UR QL: NORMAL
FENTANYL UR QL: NOT DETECTED
GABAPENTIN: NOT DETECTED
HYDROCODONE UR QL: NOT DETECTED
HYDROMORPHONE UR QL: NOT DETECTED
LORAZEPAM UR QL: NOT DETECTED
MDA UR QL: NOT DETECTED
MDEA UR QL: NOT DETECTED
MDMA UR QL: NOT DETECTED
ME-PHENIDATE UR QL: NOT DETECTED
METHADONE UR QL: NEGATIVE
METHAMPHET UR QL: NOT DETECTED
MIDAZOLAM UR QL SCN: NOT DETECTED
MORPHINE UR QL: NOT DETECTED
NALOXONE: NOT DETECTED
NORBUPRENORPHINE UR QL CFM: NOT DETECTED
NORDIAZEPAM UR QL: NOT DETECTED
NORFENTANYL UR QL: NOT DETECTED
NORHYDROCODONE UR QL CFM: NOT DETECTED
NORMEPERIDINE UR QL CFM: NOT DETECTED
NOROXYCODONE UR QL CFM: NOT DETECTED
NOROXYMORPHONE UR QL SCN: NOT DETECTED
OXAZEPAM UR QL: NOT DETECTED
OXYCODONE UR QL: NOT DETECTED
OXYMORPHONE UR QL: NOT DETECTED
PATHOLOGY STUDY: NORMAL
PCP UR QL: NEGATIVE
PHENTERMINE UR QL: NOT DETECTED
PREGABALIN: NOT DETECTED
SERVICE CMNT-IMP: NORMAL
TAPENTADOL UR QL SCN: NOT DETECTED
TAPENTADOL UR QL SCN: NOT DETECTED
TEMAZEPAM UR QL: NOT DETECTED
TRAMADOL UR QL: NEGATIVE
ZOLPIDEM METABOLITE: NOT DETECTED
ZOLPIDEM UR QL: NOT DETECTED

## 2023-12-26 NOTE — TELEPHONE ENCOUNTER
----- Message from Bacilio Larry MD sent at 12/23/2023  4:59 AM CST -----  Regarding: General surgery  Please contact patient and give a referral to General Surgery for a lipoma on his back. An order is in.

## 2024-01-04 ENCOUNTER — OFFICE VISIT (OUTPATIENT)
Dept: SURGERY | Facility: CLINIC | Age: 69
End: 2024-01-04
Payer: MEDICARE

## 2024-01-04 VITALS
BODY MASS INDEX: 29.55 KG/M2 | SYSTOLIC BLOOD PRESSURE: 127 MMHG | WEIGHT: 183.88 LBS | OXYGEN SATURATION: 99 % | DIASTOLIC BLOOD PRESSURE: 70 MMHG | HEART RATE: 88 BPM | HEIGHT: 66 IN

## 2024-01-04 DIAGNOSIS — D17.9 LIPOMA, UNSPECIFIED SITE: Primary | ICD-10-CM

## 2024-01-04 PROCEDURE — 1101F PT FALLS ASSESS-DOCD LE1/YR: CPT | Mod: CPTII,S$GLB,, | Performed by: SURGERY

## 2024-01-04 PROCEDURE — 1126F AMNT PAIN NOTED NONE PRSNT: CPT | Mod: CPTII,S$GLB,, | Performed by: SURGERY

## 2024-01-04 PROCEDURE — 99203 OFFICE O/P NEW LOW 30 MIN: CPT | Mod: 57,S$GLB,, | Performed by: SURGERY

## 2024-01-04 PROCEDURE — 3074F SYST BP LT 130 MM HG: CPT | Mod: CPTII,S$GLB,, | Performed by: SURGERY

## 2024-01-04 PROCEDURE — 3078F DIAST BP <80 MM HG: CPT | Mod: CPTII,S$GLB,, | Performed by: SURGERY

## 2024-01-04 PROCEDURE — 3288F FALL RISK ASSESSMENT DOCD: CPT | Mod: CPTII,S$GLB,, | Performed by: SURGERY

## 2024-01-04 PROCEDURE — 3008F BODY MASS INDEX DOCD: CPT | Mod: CPTII,S$GLB,, | Performed by: SURGERY

## 2024-01-04 PROCEDURE — 1159F MED LIST DOCD IN RCRD: CPT | Mod: CPTII,S$GLB,, | Performed by: SURGERY

## 2024-01-04 PROCEDURE — 99999 PR PBB SHADOW E&M-EST. PATIENT-LVL IV: CPT | Mod: PBBFAC,,, | Performed by: SURGERY

## 2024-01-04 NOTE — PROGRESS NOTES
General Surgery Office Visit   History and Physical    Patient Name: Donald Warren Abadie  YOB: 1955 (68 y.o.)  MRN: [id number]  Today's Date: 01/04/2024    Referring Md:   Bacilio Larry, Md  [address]  [place] [address]    SUBJECTIVE:     Chief Complaint: Lipoma    History of Present Illness:  Donald Warren Abadie is a 68 y.o. male with PMHx of afib (on eliquis), DM, HTN, renal cell carcinoma (s/p partial nephrectomy), Diastolic HF, alcohol use disorder who presents to the clinic today for evaluation for lipoma removal. He says the lipoma has been present for years and he does thinks it has grown recently. Denies any pain but says it itches. Patient denies fever, chills, unintentional weight loss, n/v/d, constipation, hematochezia, dysuria, hematuria, CP, SOB, and all other symptoms. Patient reports being compliant with home medication regimen.     Ultrasound of his back in 2020 was consistent with a lipoma possibly intramuscular however on exam the lipoma is very mobile    Patient denies personal history of MI, CVA, lung disease  Denies tobacco, and elicit drug use. Alcohol use disorder, currently still drinking  Not currently on any anticoagulants    Current Outpatient Medications   Medication Sig Dispense Refill    allopurinoL (ZYLOPRIM) 100 MG tablet Take 2 tablets (200 mg total) by mouth once daily. 180 tablet 3    ALPRAZolam (XANAX) 0.5 MG tablet Take 1 tablet (0.5 mg total) by mouth 3 (three) times daily as needed for Anxiety. 30 tablet 0    cyanocobalamin (VITAMIN B-12) 1000 MCG tablet Take 1 tablet (1,000 mcg total) by mouth once daily.      diclofenac sodium (VOLTAREN) 1 % Gel Apply 2 g topically 3 (three) times daily as needed (Right knee - hand pain).      dronedarone (MULTAQ) 400 mg Tab Take 1 tablet (400 mg total) by mouth 2 (two) times daily with meals. 60 tablet 11    ELIQUIS 5 mg Tab TAKE 1 TABLET BY MOUTH TWICE DAILY (Patient taking differently: Take 5 mg by mouth 2  "(two) times daily.) 180 tablet 3    folic acid (FOLVITE) 1 MG tablet Take 1 tablet (1 mg total) by mouth once daily. 30 tablet 11    ketoconazole (NIZORAL) 2 % cream Apply topically once daily. Apply to affected skin from toes to heels twice a day for 3-4 weeks. 60 g 6    magnesium oxide (MAG-OX) 400 mg (241.3 mg magnesium) tablet Take 1 tablet (400 mg total) by mouth once daily. 14 tablet 0    metFORMIN (GLUCOPHAGE-XR) 500 MG ER 24hr tablet Take 2 tablets (1,000 mg total) by mouth 2 (two) times daily with meals. 360 tablet 3    metoprolol succinate (TOPROL-XL) 25 MG 24 hr tablet Take 1 tablet (25 mg total) by mouth once daily. 90 tablet 3    multivitamin (THERAGRAN) tablet Take 1 tablet by mouth once daily.      naltrexone (DEPADE) 50 mg tablet Take 50 mg by mouth once daily. 90 tablet 2    omega-3 acid ethyl esters (LOVAZA) 1 gram capsule Take 2 g by mouth 2 (two) times daily.      pantoprazole (PROTONIX) 40 MG tablet Take 1 tablet (40 mg total) by mouth once daily. 90 tablet 3    rOPINIRole (REQUIP) 1 MG tablet TAKE 1 TABLET(1 MG) BY MOUTH EVERY EVENING (Patient taking differently: Take 1 mg by mouth every evening.) 30 tablet 11    rosuvastatin (CRESTOR) 20 MG tablet TAKE 1 TABLET(20 MG) BY MOUTH EVERY DAY 90 tablet 3    sertraline (ZOLOFT) 50 MG tablet Take 1 tablet (50 mg total) by mouth once daily. 30 tablet 11    tamsulosin (FLOMAX) 0.4 mg Cap TAKE 1 CAPSULE(0.4 MG) BY MOUTH EVERY DAY (Patient taking differently: Take 0.4 mg by mouth every evening.) 90 capsule 3    VITAMIN B-1 100 MG tablet TAKE 1 TABLET BY MOUTH ONCE DAILY 30 tablet 2     No current facility-administered medications for this visit.     Review of patient's allergies indicates:   Allergen Reactions    No known drug allergies      Past Medical History:   Diagnosis Date    A-fib     Alcohol abuse     Anxiety     Arthritis     Chronic gout     Diabetes mellitus     DM (diabetes mellitus) 11/16/2016    "boarderline, not taking any meds currently" "    Fatty liver     Hyperlipidemia     Renal cell cancer 2014    S/P TKR (total knee replacement), right 6/27/2019     Past Surgical History:   Procedure Laterality Date    ABSCESS DRAINAGE      perirectal    COLONOSCOPY      HAND SURGERY Left     JOINT REPLACEMENT      knee replacement right knee    KIDNEY SURGERY      partial left kidney removal - [place]    PARTIAL NEPHRECTOMY Left August 2014    PERCUTANEOUS CRYOTHERAPY OF PERIPHERAL NERVE USING LIQUID NITROUS OXIDE IN CLOSED NEEDLE DEVICE Right 6/17/2019    Procedure: CRYOTHERAPY, NERVE, PERIPHERAL, PERCUTANEOUS, USING LIQUID NITROUS OXIDE IN CLOSED NEEDLE DEVICE-right knee iovera;  Surgeon: Donny Hair III, MD;  Location: [facility] CATH LAB;  Service: Pain Management;  Laterality: Right;    TOTAL KNEE ARTHROPLASTY Right 6/27/2019    Procedure: ARTHROPLASTY, KNEE, TOTAL-SAME DAY;  Surgeon: Mikael Huerta MD;  Location: [facility] OR [address] [place] [address];  Service: Orthopedics;  Laterality: Right;     Family History   Problem Relation Age of Onset    Lung cancer Father     Colon cancer Father     Cancer Father         lung cancer    Diabetes Mother     Alzheimer's disease Mother     Lung cancer Sister     Cancer Sister         lung    Colon polyps Brother     Alcohol abuse Brother     Alzheimer's disease Brother     Cirrhosis Neg Hx      Social History     Tobacco Use    Smoking status: Never    Smokeless tobacco: Never   Substance Use Topics    Alcohol use: Yes     Alcohol/week: 84.0 standard drinks of alcohol     Types: 84 Cans of beer per week     Comment: 12 beers daily    Drug use: No        Review of Systems:  Review of Systems   Constitutional:  Negative for chills and fever.   Respiratory:  Negative for cough and shortness of breath.    Cardiovascular:  Negative for chest pain and palpitations.   Gastrointestinal:  Negative for nausea and vomiting.   Musculoskeletal:  Negative for back pain and myalgias.   Skin:  Negative for itching and rash.   All other systems  "reviewed and are negative.      OBJECTIVE:     Vital Signs (Most Recent)  /70 (BP Location: Left arm, Patient Position: Sitting)   Pulse 88   Ht 5' 6" (1.676 m)   Wt 83.4 kg (183 lb 13.8 oz)   SpO2 99%   BMI 29.68 kg/m²     Physical Exam  Constitutional:       Appearance: Normal appearance.   HENT:      Head: Normocephalic and atraumatic.   Eyes:      Extraocular Movements: Extraocular movements intact.      Conjunctiva/sclera: Conjunctivae normal.   Cardiovascular:      Rate and Rhythm: Normal rate.   Pulmonary:      Effort: Pulmonary effort is normal. No respiratory distress.   Musculoskeletal:         General: Normal range of motion.      Comments: 3cm soft, mobile lipoma of left upper back   Skin:     General: Skin is warm and dry.   Neurological:      General: No focal deficit present.      Mental Status: He is alert and oriented to person, place, and time.   Psychiatric:         Mood and Affect: Mood normal.           Labs:     Lab Results   Component Value Date    WBC 9.03 10/29/2023    HGB 13.8 (L) 10/29/2023    HCT 41.9 10/29/2023    MCV 85 10/29/2023     10/29/2023         CMP  Sodium   Date Value Ref Range Status   10/29/2023 132 (L) 136 - 145 mmol/L Final     Potassium   Date Value Ref Range Status   10/29/2023 3.8 3.5 - 5.1 mmol/L Final     Chloride   Date Value Ref Range Status   10/29/2023 98 95 - 110 mmol/L Final     CO2   Date Value Ref Range Status   10/29/2023 20 (L) 23 - 29 mmol/L Final     Glucose   Date Value Ref Range Status   10/29/2023 171 (H) 70 - 110 mg/dL Final     BUN   Date Value Ref Range Status   10/29/2023 15 8 - 23 mg/dL Final     Creatinine   Date Value Ref Range Status   10/29/2023 1.2 0.5 - 1.4 mg/dL Final     Calcium   Date Value Ref Range Status   10/29/2023 9.5 8.7 - 10.5 mg/dL Final     Total Protein   Date Value Ref Range Status   10/28/2023 7.6 6.0 - 8.4 g/dL Final     Albumin   Date Value Ref Range Status   10/29/2023 3.9 3.5 - 5.2 g/dL Final     Total " Bilirubin   Date Value Ref Range Status   10/28/2023 0.8 0.1 - 1.0 mg/dL Final     Comment:     For infants and newborns, interpretation of results should be based  on gestational age, weight and in agreement with clinical  observations.    Premature Infant recommended reference ranges:  Up to 24 hours.............<8.0 mg/dL  Up to 48 hours............<12.0 mg/dL  3-5 days..................<15.0 mg/dL  6-29 days.................<15.0 mg/dL       Alkaline Phosphatase   Date Value Ref Range Status   10/28/2023 83 55 - 135 U/L Final     AST   Date Value Ref Range Status   10/28/2023 32 10 - 40 U/L Final     ALT   Date Value Ref Range Status   10/28/2023 23 10 - 44 U/L Final     Anion Gap   Date Value Ref Range Status   10/29/2023 14 8 - 16 mmol/L Final     eGFR if    Date Value Ref Range Status   01/10/2022 >60.0 >60 mL/min/1.73 m^2 Final     eGFR if non    Date Value Ref Range Status   01/10/2022 >60.0 >60 mL/min/1.73 m^2 Final     Comment:     Calculation used to obtain the estimated glomerular filtration  rate (eGFR) is the CKD-EPI equation.            Imaging:   Soft Tissue US 2020:  There is a 3.8 x 1.2 x 3.6 cm well circumscribed homogenous isoechoic immobile solid mass.  Given the lentiform shape and indentation into the underlying muscle, likely intramuscular lipoma.       ASSESSMENT/PLAN:     Kleber was seen today for consult.    Diagnoses and all orders for this visit:    Lipoma, unspecified site  -     Ambulatory referral/consult to General Surgery      Donald Warren Abadie is a 68 y.o. male with PMHx of afib (on eliquis), DM, HTN, renal cell carcinoma (s/p partial nephrectomy), Diastolic HF, alcohol use disorder who presents to the clinic today for evaluation for lipoma removal.    - Schedule for left upper back lipoma removal in minors  - Patient instructed to call clinic with any questions, concerns, or new symptoms  - Patient understands and in agreement with plan; all  questions answered    Case discussed with Dr. Rasmussen.      Ann Douglass MD   General Surgery PGY-1  1/4/2024

## 2024-01-13 ENCOUNTER — TELEPHONE (OUTPATIENT)
Dept: FAMILY MEDICINE | Facility: CLINIC | Age: 69
End: 2024-01-13
Payer: MEDICARE

## 2024-01-17 ENCOUNTER — TELEPHONE (OUTPATIENT)
Dept: PRIMARY CARE CLINIC | Facility: CLINIC | Age: 69
End: 2024-01-17
Payer: MEDICARE

## 2024-01-17 ENCOUNTER — TELEPHONE (OUTPATIENT)
Dept: INTERNAL MEDICINE | Facility: CLINIC | Age: 69
End: 2024-01-17
Payer: MEDICARE

## 2024-01-17 NOTE — TELEPHONE ENCOUNTER
Held slot for 01/31/2024 10 am Dr. Larry for testicular pain and responded to portal msg informing that he can see another Physician for sooner.

## 2024-01-18 RX ORDER — PANTOPRAZOLE SODIUM 40 MG/1
TABLET, DELAYED RELEASE ORAL
Qty: 90 TABLET | Refills: 3 | Status: SHIPPED | OUTPATIENT
Start: 2024-01-18

## 2024-01-18 NOTE — TELEPHONE ENCOUNTER
Refill Decision Note   Donald Abadie  is requesting a refill authorization.  Brief Assessment and Rationale for Refill:  Approve     Medication Therapy Plan:         Comments:     Note composed:9:33 AM 01/18/2024             Appointments     Last Visit   12/22/2023 Bacilio Larry MD   Next Visit   1/31/2024 Bacilio Larry MD

## 2024-01-18 NOTE — TELEPHONE ENCOUNTER
No care due was identified.  [facility] Embedded Care Due Messages. Reference number: [id number].   1/18/2024 5:32:42 AM CST

## 2024-01-29 DIAGNOSIS — F41.9 ANXIETY: ICD-10-CM

## 2024-01-29 NOTE — TELEPHONE ENCOUNTER
No care due was identified.  [facility] Embedded Care Due Messages. Reference number: [id number].   1/29/2024 3:32:30 PM CST

## 2024-01-30 RX ORDER — ALPRAZOLAM 0.5 MG/1
0.5 TABLET ORAL
Qty: 30 TABLET | Refills: 0 | Status: SHIPPED | OUTPATIENT
Start: 2024-01-30 | End: 2024-02-29

## 2024-01-31 DIAGNOSIS — G25.81 RESTLESS LEG: ICD-10-CM

## 2024-01-31 RX ORDER — ROPINIROLE 1 MG/1
TABLET, FILM COATED ORAL
Qty: 90 TABLET | Refills: 3 | Status: SHIPPED | OUTPATIENT
Start: 2024-01-31

## 2024-01-31 NOTE — TELEPHONE ENCOUNTER
No care due was identified.  [facility] Embedded Care Due Messages. Reference number: [id number].   1/31/2024 9:45:16 AM CST

## 2024-01-31 NOTE — TELEPHONE ENCOUNTER
Refill Decision Note   Kleber Abadie  is requesting a refill authorization.  Brief Assessment and Rationale for Refill:  Approve     Medication Therapy Plan:         Comments:     Note composed:9:59 AM 01/31/2024

## 2024-02-08 ENCOUNTER — PROCEDURE VISIT (OUTPATIENT)
Dept: SURGERY | Facility: CLINIC | Age: 69
End: 2024-02-08
Payer: MEDICARE

## 2024-02-08 VITALS
SYSTOLIC BLOOD PRESSURE: 105 MMHG | HEART RATE: 106 BPM | WEIGHT: 184.5 LBS | BODY MASS INDEX: 29.65 KG/M2 | DIASTOLIC BLOOD PRESSURE: 61 MMHG | HEIGHT: 66 IN | OXYGEN SATURATION: 98 %

## 2024-02-08 DIAGNOSIS — R22.2 MASS OF SUBCUTANEOUS TISSUE OF BACK: Primary | ICD-10-CM

## 2024-02-08 PROCEDURE — 88307 TISSUE EXAM BY PATHOLOGIST: CPT | Mod: 26,,, | Performed by: PATHOLOGY

## 2024-02-08 PROCEDURE — 88307 TISSUE EXAM BY PATHOLOGIST: CPT | Performed by: PATHOLOGY

## 2024-02-08 PROCEDURE — 11404 EXC TR-EXT B9+MARG 3.1-4 CM: CPT | Mod: S$GLB,,, | Performed by: SURGERY

## 2024-02-08 NOTE — PROCEDURES
[facility]  Surgery Department  Operative Note       Date of Procedure: 2/8/2024     Pre-Operative Diagnosis:   Subcutaneous mass of the back     Post-Operative Diagnosis: Same    Anesthesia: Local / Awake    Operative Findings:   6 x 4 cm subcutaneous mass consistent with lipoma     Procedures:  Excision of subcutaneous mass    Estimated Blood Loss (EBL):  <10 mL           Indications:  Donald Warren Abadie presents for the above procedures, risks and benefits including bleeding, infection, seroma, discovery of additional disease, margin positivity, need for additional procedures and imponderables were reviewed.  He gave consent to proceed.      Details:  The patient was kept awake and moved into a comfortable position to access the operative field, extremites and pressure points padded and protected, and the field prepped in standard sterile fashion.  An appropriate timeout was confirmed.      Local anesthetic was injected as a field block.    A 5 cm incision was made with the 15 blade scalpel. A combination of sharp and blunt dissection was used to circumferentially dissect the mass from the surrounding tissue. The was was then removed in its entirety and sent for pathology. The mass was consistent in appearance with lipoma and measured 6 x 4 cm. The wound was then irrigated with sterile saline and hemostasis was confirmed. The wound was then closed in two layers with a deep dermal layer of 3-0 vicryl buried interrupted deep dermal sutures and a 4-0 monocryl running subcuticular stitch. Dermabond was applied to the wound.       All needle, sponge and instrument counts were correct.  The patient tolerated the procedure well and was awake and conversant throughout the procedure.    Specimens:   Specimen (24h ago, onward)       Start     Ordered    02/08/24 0000  Specimen to Pathology Soft tissue, tumors, and Sarcoma (Back mass)        Comments: Specimen [id number]: Number of Specimens: 1Name  of Specimens: BackRelease to patient->Immediate     References:    Click here for ordering Quick Tip   Question Answer Comment   Procedure Type: Soft tissue, tumors, and Sarcoma Back mass   Clinical Information: Back    Release to patient Immediate        02/08/24 7756                            Condition: Awake, ambulatory    Disposition: Home with instructions for wound care and clinic follow up.    Attestation: Dr. Rasmussen was available for the procedure.     Malcolm Partida MD   General surgery, PGY-3

## 2024-02-14 DIAGNOSIS — E11.9 TYPE 2 DIABETES MELLITUS WITHOUT COMPLICATION: ICD-10-CM

## 2024-02-14 LAB
FINAL PATHOLOGIC DIAGNOSIS: NORMAL
GROSS: NORMAL
Lab: NORMAL

## 2024-02-20 ENCOUNTER — OFFICE VISIT (OUTPATIENT)
Dept: INTERNAL MEDICINE | Facility: CLINIC | Age: 69
End: 2024-02-20
Payer: MEDICARE

## 2024-02-20 ENCOUNTER — TELEPHONE (OUTPATIENT)
Dept: INTERNAL MEDICINE | Facility: CLINIC | Age: 69
End: 2024-02-20
Payer: MEDICARE

## 2024-02-20 VITALS
HEIGHT: 66 IN | DIASTOLIC BLOOD PRESSURE: 84 MMHG | HEART RATE: 80 BPM | OXYGEN SATURATION: 96 % | WEIGHT: 191.5 LBS | SYSTOLIC BLOOD PRESSURE: 134 MMHG | BODY MASS INDEX: 30.78 KG/M2

## 2024-02-20 DIAGNOSIS — N50.811 TESTICULAR PAIN, RIGHT: Primary | ICD-10-CM

## 2024-02-20 LAB
BILIRUB UR QL STRIP: NEGATIVE
CLARITY UR REFRACT.AUTO: CLEAR
COLOR UR AUTO: COLORLESS
GLUCOSE UR QL STRIP: NEGATIVE
HGB UR QL STRIP: NEGATIVE
KETONES UR QL STRIP: NEGATIVE
LEUKOCYTE ESTERASE UR QL STRIP: NEGATIVE
NITRITE UR QL STRIP: NEGATIVE
PH UR STRIP: 5 [PH] (ref 5–8)
PROT UR QL STRIP: NEGATIVE
SP GR UR STRIP: 1 (ref 1–1.03)
URN SPEC COLLECT METH UR: ABNORMAL

## 2024-02-20 PROCEDURE — 1160F RVW MEDS BY RX/DR IN RCRD: CPT | Mod: CPTII,S$GLB,,

## 2024-02-20 PROCEDURE — 1159F MED LIST DOCD IN RCRD: CPT | Mod: CPTII,S$GLB,,

## 2024-02-20 PROCEDURE — 87086 URINE CULTURE/COLONY COUNT: CPT

## 2024-02-20 PROCEDURE — 99999 PR PBB SHADOW E&M-EST. PATIENT-LVL V: CPT | Mod: PBBFAC,,,

## 2024-02-20 PROCEDURE — 1101F PT FALLS ASSESS-DOCD LE1/YR: CPT | Mod: CPTII,S$GLB,,

## 2024-02-20 PROCEDURE — 81003 URINALYSIS AUTO W/O SCOPE: CPT

## 2024-02-20 PROCEDURE — 3079F DIAST BP 80-89 MM HG: CPT | Mod: CPTII,S$GLB,,

## 2024-02-20 PROCEDURE — 99214 OFFICE O/P EST MOD 30 MIN: CPT | Mod: S$GLB,,,

## 2024-02-20 PROCEDURE — 3288F FALL RISK ASSESSMENT DOCD: CPT | Mod: CPTII,S$GLB,,

## 2024-02-20 PROCEDURE — 1126F AMNT PAIN NOTED NONE PRSNT: CPT | Mod: CPTII,S$GLB,,

## 2024-02-20 PROCEDURE — 3008F BODY MASS INDEX DOCD: CPT | Mod: CPTII,S$GLB,,

## 2024-02-20 PROCEDURE — 3075F SYST BP GE 130 - 139MM HG: CPT | Mod: CPTII,S$GLB,,

## 2024-02-20 NOTE — TELEPHONE ENCOUNTER
Attempted to contact pt no success, left voice message.  Pt ended up scheduling today with Alison Acosta NP at 1 pm 02/20/2024.

## 2024-02-20 NOTE — PROGRESS NOTES
"INTERNAL MEDICINE PROGRESS/URGENT CARE NOTE    Patient: Donald Warren Abadie  : 1955  MRN: [id number]  PCP: Bacilio Larry MD    CHIEF COMPLAINT     Chief Complaint   Patient presents with    Testicle Pain       HPI     Kleber is a 68 y.o. male with T2DM, HLD, A fib, CHF, renal cell cancer, gout, depression, anxiety, substance use disorder who presents for an urgent visit today with c/o right testicular pain for the last month. Has come and gone. Worse in the last two weeks and painful this morning. He states if he lays on his right side, will feel this pain. He thinks his leg may be putting pressure on his right testicle. 9/10, squeezing pain. Feels like he can't lift anything heavy. Gets better as he's doing activities throughout the day.     Endorses nocturia (4-5 trips at night). Denies fevers, dysuria, hematuria. Not sexually active in the last month.       Past Medical History:  Past Medical History:   Diagnosis Date    A-fib     Alcohol abuse     Anxiety     Arthritis     Chronic gout     Diabetes mellitus     DM (diabetes mellitus) 2016    "boarderline, not taking any meds currently"    Fatty liver     Hyperlipidemia     Renal cell cancer     S/P TKR (total knee replacement), right 2019        Past Surgical History:  Past Surgical History:   Procedure Laterality Date    ABSCESS DRAINAGE      perirectal    COLONOSCOPY      HAND SURGERY Left     JOINT REPLACEMENT      knee replacement right knee    KIDNEY SURGERY      partial left kidney removal - [place]    PARTIAL NEPHRECTOMY Left 2014    PERCUTANEOUS CRYOTHERAPY OF PERIPHERAL NERVE USING LIQUID NITROUS OXIDE IN CLOSED NEEDLE DEVICE Right 2019    Procedure: CRYOTHERAPY, NERVE, PERIPHERAL, PERCUTANEOUS, USING LIQUID NITROUS OXIDE IN CLOSED NEEDLE DEVICE-right knee iovera;  Surgeon: Donny Hair III, MD;  Location: [facility] CATH LAB;  Service: Pain Management;  Laterality: Right;    TOTAL KNEE ARTHROPLASTY Right 2019    " Procedure: ARTHROPLASTY, KNEE, TOTAL-SAME DAY;  Surgeon: Mikael Huerta MD;  Location: [facility] OR [address] [place] [address];  Service: Orthopedics;  Laterality: Right;        Allergies:  Review of patient's allergies indicates:   Allergen Reactions    No known drug allergies        Home Medications:    Current Outpatient Medications:     ALPRAZolam (XANAX) 0.5 MG tablet, TAKE 1 TABLET(0.5 MG) BY MOUTH THREE TIMES DAILY AS NEEDED FOR ANXIETY, Disp: 30 tablet, Rfl: 0    cyanocobalamin (VITAMIN B-12) 1000 MCG tablet, Take 1 tablet (1,000 mcg total) by mouth once daily., Disp: , Rfl:     diclofenac sodium (VOLTAREN) 1 % Gel, Apply 2 g topically 3 (three) times daily as needed (Right knee - hand pain)., Disp: , Rfl:     dronedarone (MULTAQ) 400 mg Tab, Take 1 tablet (400 mg total) by mouth 2 (two) times daily with meals., Disp: 60 tablet, Rfl: 11    ELIQUIS 5 mg Tab, TAKE 1 TABLET BY MOUTH TWICE DAILY (Patient taking differently: Take 5 mg by mouth 2 (two) times daily.), Disp: 180 tablet, Rfl: 3    ketoconazole (NIZORAL) 2 % cream, Apply topically once daily. Apply to affected skin from toes to heels twice a day for 3-4 weeks., Disp: 60 g, Rfl: 6    magnesium oxide (MAG-OX) 400 mg (241.3 mg magnesium) tablet, Take 1 tablet (400 mg total) by mouth once daily., Disp: 14 tablet, Rfl: 0    metFORMIN (GLUCOPHAGE-XR) 500 MG ER 24hr tablet, Take 2 tablets (1,000 mg total) by mouth 2 (two) times daily with meals., Disp: 360 tablet, Rfl: 3    metoprolol succinate (TOPROL-XL) 25 MG 24 hr tablet, Take 1 tablet (25 mg total) by mouth once daily., Disp: 90 tablet, Rfl: 3    multivitamin (THERAGRAN) tablet, Take 1 tablet by mouth once daily., Disp: , Rfl:     naltrexone (DEPADE) 50 mg tablet, Take 50 mg by mouth once daily., Disp: 90 tablet, Rfl: 2    omega-3 acid ethyl esters (LOVAZA) 1 gram capsule, Take 2 g by mouth 2 (two) times daily., Disp: , Rfl:     pantoprazole (PROTONIX) 40 MG tablet, Take 1 tablet (40 mg total) by mouth once daily., Disp:  "90 tablet, Rfl: 3    rOPINIRole (REQUIP) 1 MG tablet, TAKE 1 TABLET(1 MG) BY MOUTH EVERY EVENING, Disp: 90 tablet, Rfl: 3    rosuvastatin (CRESTOR) 20 MG tablet, TAKE 1 TABLET(20 MG) BY MOUTH EVERY DAY, Disp: 90 tablet, Rfl: 3    sertraline (ZOLOFT) 50 MG tablet, Take 1 tablet (50 mg total) by mouth once daily., Disp: 30 tablet, Rfl: 11    tamsulosin (FLOMAX) 0.4 mg Cap, TAKE 1 CAPSULE(0.4 MG) BY MOUTH EVERY DAY (Patient taking differently: Take 0.4 mg by mouth every evening.), Disp: 90 capsule, Rfl: 3    VITAMIN B-1 100 MG tablet, TAKE 1 TABLET BY MOUTH ONCE DAILY, Disp: 30 tablet, Rfl: 2    allopurinoL (ZYLOPRIM) 100 MG tablet, Take 2 tablets (200 mg total) by mouth once daily., Disp: 180 tablet, Rfl: 3    folic acid (FOLVITE) 1 MG tablet, Take 1 tablet (1 mg total) by mouth once daily., Disp: 30 tablet, Rfl: 11     Review of Systems:  Review of Systems   Constitutional:  Negative for fever.   Respiratory:  Negative for chest tightness and shortness of breath.    Cardiovascular:  Negative for chest pain.   Gastrointestinal:  Negative for abdominal pain, blood in stool, diarrhea, nausea and vomiting.   Genitourinary:  Positive for testicular pain.   Musculoskeletal:  Negative for myalgias.   Neurological:  Negative for dizziness, weakness and headaches.   Psychiatric/Behavioral:  Negative for suicidal ideas.          PHYSICAL EXAM     Vitals:    02/20/24 1311   BP: 134/84   BP Location: Left arm   Patient Position: Sitting   BP Method: Medium (Manual)   Pulse: 80   SpO2: 96%   Weight: 86.8 kg (191 lb 7.5 oz)   Height: 5' 6" (1.676 m)      Body mass index is 30.9 kg/m².     Physical Exam  Constitutional:       General: He is not in acute distress.     Appearance: Normal appearance. He is not ill-appearing, toxic-appearing or diaphoretic.   HENT:      Head: Normocephalic.   Eyes:      Extraocular Movements: Extraocular movements intact.      Pupils: Pupils are equal, round, and reactive to light.   Cardiovascular:    "   Rate and Rhythm: Normal rate and regular rhythm.   Pulmonary:      Effort: Pulmonary effort is normal. No respiratory distress.      Breath sounds: Normal breath sounds.   Abdominal:      General: Bowel sounds are normal.      Palpations: Abdomen is soft.   Genitourinary:     Penis: Normal.       Testes: Normal.   Musculoskeletal:         General: Normal range of motion.      Cervical back: Normal range of motion.   Skin:     General: Skin is warm and dry.   Neurological:      General: No focal deficit present.      Mental Status: He is alert and oriented to person, place, and time.         LABS     Lab Results   Component Value Date    HGBA1C 8.0 (H) 10/29/2023     CMP  Sodium   Date Value Ref Range Status   10/29/2023 132 (L) 136 - 145 mmol/L Final     Potassium   Date Value Ref Range Status   10/29/2023 3.8 3.5 - 5.1 mmol/L Final     Chloride   Date Value Ref Range Status   10/29/2023 98 95 - 110 mmol/L Final     CO2   Date Value Ref Range Status   10/29/2023 20 (L) 23 - 29 mmol/L Final     Glucose   Date Value Ref Range Status   10/29/2023 171 (H) 70 - 110 mg/dL Final     BUN   Date Value Ref Range Status   10/29/2023 15 8 - 23 mg/dL Final     Creatinine   Date Value Ref Range Status   10/29/2023 1.2 0.5 - 1.4 mg/dL Final     Calcium   Date Value Ref Range Status   10/29/2023 9.5 8.7 - 10.5 mg/dL Final     Total Protein   Date Value Ref Range Status   10/28/2023 7.6 6.0 - 8.4 g/dL Final     Albumin   Date Value Ref Range Status   10/29/2023 3.9 3.5 - 5.2 g/dL Final     Total Bilirubin   Date Value Ref Range Status   10/28/2023 0.8 0.1 - 1.0 mg/dL Final     Comment:     For infants and newborns, interpretation of results should be based  on gestational age, weight and in agreement with clinical  observations.    Premature Infant recommended reference ranges:  Up to 24 hours.............<8.0 mg/dL  Up to 48 hours............<12.0 mg/dL  3-5 days..................<15.0 mg/dL  6-29 days.................<15.0  mg/dL       Alkaline Phosphatase   Date Value Ref Range Status   10/28/2023 83 55 - 135 U/L Final     AST   Date Value Ref Range Status   10/28/2023 32 10 - 40 U/L Final     ALT   Date Value Ref Range Status   10/28/2023 23 10 - 44 U/L Final     Anion Gap   Date Value Ref Range Status   10/29/2023 14 8 - 16 mmol/L Final     eGFR if    Date Value Ref Range Status   01/10/2022 >60.0 >60 mL/min/1.73 m^2 Final     eGFR if non    Date Value Ref Range Status   01/10/2022 >60.0 >60 mL/min/1.73 m^2 Final     Comment:     Calculation used to obtain the estimated glomerular filtration  rate (eGFR) is the CKD-EPI equation.        Lab Results   Component Value Date    WBC 9.03 10/29/2023    HGB 13.8 (L) 10/29/2023    HCT 41.9 10/29/2023    MCV 85 10/29/2023     10/29/2023     Lab Results   Component Value Date    CHOL 123 11/11/2022    CHOL 125 01/10/2022    CHOL 78 (L) 05/06/2021     Lab Results   Component Value Date    HDL 43 11/11/2022    HDL 30 (L) 01/10/2022    HDL 43 05/06/2021     Lab Results   Component Value Date    LDLCALC 37.4 (L) 11/11/2022    LDLCALC 79.6 01/10/2022    LDLCALC 21.8 (L) 05/06/2021     Lab Results   Component Value Date    TRIG 213 (H) 11/11/2022    TRIG 77 01/10/2022    TRIG 66 05/06/2021     Lab Results   Component Value Date    CHOLHDL 35.0 11/11/2022    CHOLHDL 24.0 01/10/2022    CHOLHDL 55.1 (H) 05/06/2021     Lab Results   Component Value Date    TSH 2.089 10/29/2023    B6FPSIV 6.4 05/06/2021       ASSESSMENT & PLAN       1. Testicular pain, right  Comments:  Stable. Will get US. Urine studies collected. ER precautions reviewed.  Orders:  -     US Scrotum And Testicles; Future; Expected date: 02/20/2024  -     Urinalysis  -     Urine culture    Exam appears benign. Suspect possible right inguinal hernia. US ordered. Will screen urine.    Follow up if symptoms worsen or fail to improve.    Patient was counseled on when to seek emergent care. Patient's  plan/treatment was discussed including medications and possible side effects. Verbalized understanding of all instructions.            DARCY Yun, FNP-C  Department of Internal Medicine  [facility]

## 2024-02-20 NOTE — TELEPHONE ENCOUNTER
----- Message from Marcelle Lee sent at 2/20/2024  8:06 AM CST -----  Contact: [phone]  Symptom: Testicle Symptoms  Outcome: Transfer to a nurse or provider NOW!  Reason: Pain in the testicle    The caller accepted this outcome    Patient did not wait on the line for the nurse to answer , and hang up. Please advise. Thank you.

## 2024-02-21 LAB — BACTERIA UR CULT: NORMAL

## 2024-02-22 ENCOUNTER — HOSPITAL ENCOUNTER (OUTPATIENT)
Dept: RADIOLOGY | Facility: HOSPITAL | Age: 69
Discharge: HOME | End: 2024-02-22
Payer: MEDICARE

## 2024-02-22 DIAGNOSIS — R10.31 RIGHT INGUINAL PAIN: Primary | ICD-10-CM

## 2024-02-22 DIAGNOSIS — N50.3 EPIDIDYMAL CYST: Primary | ICD-10-CM

## 2024-02-22 DIAGNOSIS — N50.811 TESTICULAR PAIN, RIGHT: ICD-10-CM

## 2024-02-22 PROCEDURE — 76870 US EXAM SCROTUM: CPT | Mod: TC

## 2024-02-22 PROCEDURE — 76870 US EXAM SCROTUM: CPT | Mod: 26,,, | Performed by: RADIOLOGY

## 2024-02-23 ENCOUNTER — TELEPHONE (OUTPATIENT)
Dept: INTERNAL MEDICINE | Facility: CLINIC | Age: 69
End: 2024-02-23
Payer: MEDICARE

## 2024-02-23 DIAGNOSIS — F41.9 ANXIETY: ICD-10-CM

## 2024-02-23 RX ORDER — ALLOPURINOL 100 MG/1
200 TABLET ORAL
Qty: 180 TABLET | Refills: 0 | Status: SHIPPED | OUTPATIENT
Start: 2024-02-23

## 2024-02-23 NOTE — TELEPHONE ENCOUNTER
Spoke with patient and informed him of his results. I also scheduled his ultrasound for next Tuesday to rule out the hernia.

## 2024-02-23 NOTE — TELEPHONE ENCOUNTER
----- Message from Nathalia Noonan sent at 2/23/2024 10:49 AM CST -----  Regarding: Results  Contact: [phone]  Patient is calling to speak with someone about his results. Please contact pt

## 2024-02-23 NOTE — TELEPHONE ENCOUNTER
No care due was identified.  [facility] Embedded Care Due Messages. Reference number: [id number].   2/23/2024 12:10:49 PM CST

## 2024-02-26 ENCOUNTER — TELEPHONE (OUTPATIENT)
Dept: INTERNAL MEDICINE | Facility: CLINIC | Age: 69
End: 2024-02-26
Payer: MEDICARE

## 2024-02-26 NOTE — TELEPHONE ENCOUNTER
----- Message from Arelis Larkin sent at 2/26/2024  9:52 AM CST -----  Contact: [phone]  1MEDICALADVICE     Patient is calling for Medical Advice regarding: pt wants a call back  didn't say why     How long has patient had these symptoms:    Pharmacy name and phone#:    Would like response via [employer]:  ##    Comments:

## 2024-02-26 NOTE — TELEPHONE ENCOUNTER
Spoke with patient and answered questions the patient had. I informed him that he still needs to go do the ultrasound scheduled for tomorrow to see if he has a hernia or not.

## 2024-02-27 ENCOUNTER — HOSPITAL ENCOUNTER (OUTPATIENT)
Dept: RADIOLOGY | Facility: HOSPITAL | Age: 69
Discharge: HOME | End: 2024-02-27
Payer: MEDICARE

## 2024-02-27 DIAGNOSIS — R10.31 RIGHT INGUINAL PAIN: ICD-10-CM

## 2024-02-27 PROCEDURE — 76705 ECHO EXAM OF ABDOMEN: CPT | Mod: TC

## 2024-02-27 PROCEDURE — 76705 ECHO EXAM OF ABDOMEN: CPT | Mod: 26,,, | Performed by: INTERNAL MEDICINE

## 2024-02-28 ENCOUNTER — TELEPHONE (OUTPATIENT)
Dept: INTERNAL MEDICINE | Facility: CLINIC | Age: 69
End: 2024-02-28
Payer: MEDICARE

## 2024-02-28 ENCOUNTER — PATIENT MESSAGE (OUTPATIENT)
Dept: INTERNAL MEDICINE | Facility: CLINIC | Age: 69
End: 2024-02-28
Payer: MEDICARE

## 2024-02-28 DIAGNOSIS — K76.0 FATTY LIVER: ICD-10-CM

## 2024-02-28 DIAGNOSIS — E11.9 TYPE 2 DIABETES MELLITUS WITHOUT COMPLICATION, WITHOUT LONG-TERM CURRENT USE OF INSULIN: ICD-10-CM

## 2024-02-28 DIAGNOSIS — I48.91 ATRIAL FIBRILLATION, UNSPECIFIED TYPE: Primary | ICD-10-CM

## 2024-02-28 NOTE — TELEPHONE ENCOUNTER
----- Message from Corina Kwon sent at 2/28/2024 10:24 AM CST -----  Contact: [phone] Patient  Pt is calling in regards to needing pain medicine for the testicle pain that he is having. Pt is having lots of pain and asking if something can please be sent in ASAP. Pt stated he just needs a few for today and tonight. Please call and advise.  Thank you.      [id number] [place] [facility] [address]  AT [facility] [address]  [address] [facility] [address] [place] [address]  Phone: [phone] Fax: [phone]

## 2024-02-29 ENCOUNTER — LAB VISIT (OUTPATIENT)
Dept: LAB | Facility: HOSPITAL | Age: 69
End: 2024-02-29
Attending: INTERNAL MEDICINE
Payer: MEDICARE

## 2024-02-29 ENCOUNTER — OFFICE VISIT (OUTPATIENT)
Dept: INTERNAL MEDICINE | Facility: CLINIC | Age: 69
End: 2024-02-29
Payer: MEDICARE

## 2024-02-29 VITALS
SYSTOLIC BLOOD PRESSURE: 138 MMHG | HEART RATE: 70 BPM | WEIGHT: 184.5 LBS | OXYGEN SATURATION: 98 % | HEIGHT: 66 IN | BODY MASS INDEX: 29.65 KG/M2 | DIASTOLIC BLOOD PRESSURE: 70 MMHG

## 2024-02-29 DIAGNOSIS — I48.91 ATRIAL FIBRILLATION, UNSPECIFIED TYPE: ICD-10-CM

## 2024-02-29 DIAGNOSIS — F10.10 ALCOHOL ABUSE: ICD-10-CM

## 2024-02-29 DIAGNOSIS — K76.0 FATTY LIVER: ICD-10-CM

## 2024-02-29 DIAGNOSIS — E11.9 TYPE 2 DIABETES MELLITUS WITHOUT COMPLICATION, WITHOUT LONG-TERM CURRENT USE OF INSULIN: ICD-10-CM

## 2024-02-29 DIAGNOSIS — N45.1 EPIDIDYMITIS, RIGHT: Primary | ICD-10-CM

## 2024-02-29 LAB
ALBUMIN SERPL BCP-MCNC: 4.4 G/DL (ref 3.5–5.2)
ALP SERPL-CCNC: 73 U/L (ref 55–135)
ALT SERPL W/O P-5'-P-CCNC: 33 U/L (ref 10–44)
ANION GAP SERPL CALC-SCNC: 11 MMOL/L (ref 8–16)
AST SERPL-CCNC: 53 U/L (ref 10–40)
BACTERIA #/AREA URNS AUTO: NORMAL /HPF
BASOPHILS # BLD AUTO: 0.1 K/UL (ref 0–0.2)
BASOPHILS NFR BLD: 1.6 % (ref 0–1.9)
BILIRUB SERPL-MCNC: 0.4 MG/DL (ref 0.1–1)
BILIRUB UR QL STRIP: NEGATIVE
BUN SERPL-MCNC: 7 MG/DL (ref 8–23)
C TRACH DNA SPEC QL NAA+PROBE: NOT DETECTED
CALCIUM SERPL-MCNC: 9.6 MG/DL (ref 8.7–10.5)
CHLORIDE SERPL-SCNC: 102 MMOL/L (ref 95–110)
CLARITY UR REFRACT.AUTO: CLEAR
CO2 SERPL-SCNC: 26 MMOL/L (ref 23–29)
COLOR UR AUTO: YELLOW
CREAT SERPL-MCNC: 1 MG/DL (ref 0.5–1.4)
DIFFERENTIAL METHOD BLD: ABNORMAL
EOSINOPHIL # BLD AUTO: 0.2 K/UL (ref 0–0.5)
EOSINOPHIL NFR BLD: 2.8 % (ref 0–8)
ERYTHROCYTE [DISTWIDTH] IN BLOOD BY AUTOMATED COUNT: 16.3 % (ref 11.5–14.5)
EST. GFR  (NO RACE VARIABLE): >60 ML/MIN/1.73 M^2
ESTIMATED AVG GLUCOSE: 123 MG/DL (ref 68–131)
GLUCOSE SERPL-MCNC: 119 MG/DL (ref 70–110)
GLUCOSE UR QL STRIP: NEGATIVE
HBA1C MFR BLD: 5.9 % (ref 4–5.6)
HCT VFR BLD AUTO: 45.9 % (ref 40–54)
HGB BLD-MCNC: 14.9 G/DL (ref 14–18)
HGB UR QL STRIP: NEGATIVE
IMM GRANULOCYTES # BLD AUTO: 0.04 K/UL (ref 0–0.04)
IMM GRANULOCYTES NFR BLD AUTO: 0.6 % (ref 0–0.5)
KETONES UR QL STRIP: NEGATIVE
LEUKOCYTE ESTERASE UR QL STRIP: NEGATIVE
LYMPHOCYTES # BLD AUTO: 1.3 K/UL (ref 1–4.8)
LYMPHOCYTES NFR BLD: 21.6 % (ref 18–48)
MCH RBC QN AUTO: 30.2 PG (ref 27–31)
MCHC RBC AUTO-ENTMCNC: 32.5 G/DL (ref 32–36)
MCV RBC AUTO: 93 FL (ref 82–98)
MICROSCOPIC COMMENT: NORMAL
MONOCYTES # BLD AUTO: 0.8 K/UL (ref 0.3–1)
MONOCYTES NFR BLD: 13.1 % (ref 4–15)
N GONORRHOEA DNA SPEC QL NAA+PROBE: NOT DETECTED
NEUTROPHILS # BLD AUTO: 3.7 K/UL (ref 1.8–7.7)
NEUTROPHILS NFR BLD: 60.3 % (ref 38–73)
NITRITE UR QL STRIP: NEGATIVE
NRBC BLD-RTO: 0 /100 WBC
PH UR STRIP: 7 [PH] (ref 5–8)
PLATELET # BLD AUTO: 166 K/UL (ref 150–450)
PMV BLD AUTO: 9.2 FL (ref 9.2–12.9)
POTASSIUM SERPL-SCNC: 4.4 MMOL/L (ref 3.5–5.1)
PROT SERPL-MCNC: 8.1 G/DL (ref 6–8.4)
PROT UR QL STRIP: ABNORMAL
RBC # BLD AUTO: 4.93 M/UL (ref 4.6–6.2)
RBC #/AREA URNS AUTO: 0 /HPF (ref 0–4)
SODIUM SERPL-SCNC: 139 MMOL/L (ref 136–145)
SP GR UR STRIP: 1.01 (ref 1–1.03)
URN SPEC COLLECT METH UR: ABNORMAL
WBC # BLD AUTO: 6.17 K/UL (ref 3.9–12.7)
WBC #/AREA URNS AUTO: 3 /HPF (ref 0–5)

## 2024-02-29 PROCEDURE — 99999 PR PBB SHADOW E&M-EST. PATIENT-LVL V: CPT | Mod: PBBFAC,,, | Performed by: INTERNAL MEDICINE

## 2024-02-29 PROCEDURE — 99214 OFFICE O/P EST MOD 30 MIN: CPT | Mod: S$GLB,,, | Performed by: INTERNAL MEDICINE

## 2024-02-29 PROCEDURE — 3078F DIAST BP <80 MM HG: CPT | Mod: CPTII,S$GLB,, | Performed by: INTERNAL MEDICINE

## 2024-02-29 PROCEDURE — 80053 COMPREHEN METABOLIC PANEL: CPT | Performed by: INTERNAL MEDICINE

## 2024-02-29 PROCEDURE — 87491 CHLMYD TRACH DNA AMP PROBE: CPT | Performed by: INTERNAL MEDICINE

## 2024-02-29 PROCEDURE — 3288F FALL RISK ASSESSMENT DOCD: CPT | Mod: CPTII,S$GLB,, | Performed by: INTERNAL MEDICINE

## 2024-02-29 PROCEDURE — 1159F MED LIST DOCD IN RCRD: CPT | Mod: CPTII,S$GLB,, | Performed by: INTERNAL MEDICINE

## 2024-02-29 PROCEDURE — 36415 COLL VENOUS BLD VENIPUNCTURE: CPT | Performed by: INTERNAL MEDICINE

## 2024-02-29 PROCEDURE — 3008F BODY MASS INDEX DOCD: CPT | Mod: CPTII,S$GLB,, | Performed by: INTERNAL MEDICINE

## 2024-02-29 PROCEDURE — 83036 HEMOGLOBIN GLYCOSYLATED A1C: CPT | Performed by: INTERNAL MEDICINE

## 2024-02-29 PROCEDURE — 3044F HG A1C LEVEL LT 7.0%: CPT | Mod: CPTII,S$GLB,, | Performed by: INTERNAL MEDICINE

## 2024-02-29 PROCEDURE — 1125F AMNT PAIN NOTED PAIN PRSNT: CPT | Mod: CPTII,S$GLB,, | Performed by: INTERNAL MEDICINE

## 2024-02-29 PROCEDURE — 85025 COMPLETE CBC W/AUTO DIFF WBC: CPT | Performed by: INTERNAL MEDICINE

## 2024-02-29 PROCEDURE — 3075F SYST BP GE 130 - 139MM HG: CPT | Mod: CPTII,S$GLB,, | Performed by: INTERNAL MEDICINE

## 2024-02-29 PROCEDURE — 87086 URINE CULTURE/COLONY COUNT: CPT | Performed by: INTERNAL MEDICINE

## 2024-02-29 PROCEDURE — 1101F PT FALLS ASSESS-DOCD LE1/YR: CPT | Mod: CPTII,S$GLB,, | Performed by: INTERNAL MEDICINE

## 2024-02-29 PROCEDURE — 81001 URINALYSIS AUTO W/SCOPE: CPT | Performed by: INTERNAL MEDICINE

## 2024-02-29 RX ORDER — HYDROCODONE BITARTRATE AND ACETAMINOPHEN 5; 325 MG/1; MG/1
1 TABLET ORAL EVERY 12 HOURS PRN
Qty: 20 TABLET | Refills: 0 | Status: SHIPPED | OUTPATIENT
Start: 2024-02-29 | End: 2024-03-06

## 2024-02-29 RX ORDER — ALPRAZOLAM 0.5 MG/1
0.5 TABLET ORAL NIGHTLY PRN
Qty: 30 TABLET | Refills: 0 | Status: SHIPPED | OUTPATIENT
Start: 2024-02-29 | End: 2024-05-23

## 2024-02-29 RX ORDER — SULFAMETHOXAZOLE AND TRIMETHOPRIM 800; 160 MG/1; MG/1
1 TABLET ORAL 2 TIMES DAILY
Qty: 20 TABLET | Refills: 0 | Status: SHIPPED | OUTPATIENT
Start: 2024-02-29 | End: 2024-03-06

## 2024-03-01 LAB — BACTERIA UR CULT: NO GROWTH

## 2024-03-01 NOTE — PROGRESS NOTES
CC:  Right testicular pain     HPI:  The patient is a 68-year-old male with AFib, EtOH abuse, hypertension, hyperlipidemia, elevated triglycerides, type 2 diabetes, reflux, gout, renal cell cancer status post nephrectomy and anxiety presents today for follow-up of right testicular pain.  The patient was seen by our nurse practitioner.  An ultrasound was performed of the right testicle in the right groin.  His testicular ultrasound showed a small right epididymal cyst.  Hernia ultrasound showed no hernia but several prominent normal-appearing lymph nodes.  The patient reports today with continued pain in the right groin.  His worse with sitting and better with standing.  It has a constant pain.    ROS:  Patient reports no fever chills.  No chest pain.  No shortness of breath.  No dysuria.  He is getting up 2-3 times at night to urinate.  He reports no recent sexual contacts.  He has not been checking his blood sugars lately.  He states they have been running between 110 and 130.  He does state he cut back on the amount of alcohol he drinks to 8 beers a day.    Physical exam:   General appearance:  No acute distress   HEENT: Trachea is midline without JVD   Pulmonary:  Good inspiratory, expiratory breath sounds are heard.  Lungs are clear to auscultation.  Cardiovascular:  S1-S2, rhythm is regular.  Extremities without edema.    GI: Abdomen is nontender, nondistended without hepatosplenomegaly  :  The patient testes were evaluated.  He did report some tenderness above the right testicle.  A small nodule could be felt in the epididymis.  No evidence of an indirect or direct hernia was felt.  Comments:  Did discuss with the patient put him on antibiotics.    Assessment:  1. Right epididymitis  2.  AFib  3.  Alcohol use  4.  Fatty liver    Plan:  1. Will order a UA and culture as well as GC and chlamydia  2. Will start the patient on Bactrim DS 1 p.o. b.i.d.  3. Was discussed patient that alcohol does not mix pain  pills.  He was also advised to stay out of strong sunlight Bactrim.  He is to call for any rash problems with the medication.  If his symptoms do not improve, we will refer to Urology.

## 2024-03-06 ENCOUNTER — TELEPHONE (OUTPATIENT)
Dept: INTERNAL MEDICINE | Facility: CLINIC | Age: 69
End: 2024-03-06
Payer: MEDICARE

## 2024-03-06 DIAGNOSIS — N45.1 EPIDIDYMITIS, RIGHT: ICD-10-CM

## 2024-03-06 RX ORDER — HYDROCODONE BITARTRATE AND ACETAMINOPHEN 5; 325 MG/1; MG/1
1 TABLET ORAL EVERY 12 HOURS PRN
Qty: 10 TABLET | Refills: 0 | Status: SHIPPED | OUTPATIENT
Start: 2024-03-06 | End: 2024-03-27 | Stop reason: SDUPTHER

## 2024-03-06 RX ORDER — HYDROCODONE BITARTRATE AND ACETAMINOPHEN 5; 325 MG/1; MG/1
1 TABLET ORAL EVERY 12 HOURS PRN
Qty: 20 TABLET | Refills: 0 | OUTPATIENT
Start: 2024-03-06

## 2024-03-06 RX ORDER — SULFAMETHOXAZOLE AND TRIMETHOPRIM 800; 160 MG/1; MG/1
1 TABLET ORAL 2 TIMES DAILY
Qty: 10 TABLET | Refills: 0 | Status: SHIPPED | OUTPATIENT
Start: 2024-03-06 | End: 2024-03-11

## 2024-03-06 RX ORDER — SULFAMETHOXAZOLE AND TRIMETHOPRIM 800; 160 MG/1; MG/1
1 TABLET ORAL 2 TIMES DAILY
Qty: 20 TABLET | Refills: 0 | OUTPATIENT
Start: 2024-03-06 | End: 2024-03-16

## 2024-03-06 NOTE — TELEPHONE ENCOUNTER
----- Message from Claudia Loera sent at 3/6/2024  2:30 PM CST -----  Regarding: medication refill  Contact: patient  Type:  RX Refill Request    Who Called:  patient  Refill or New Rx:  refill  RX Name and Strength:    HYDROcodone-acetaminophen (NORCO) 5-325 mg per tablet  sulfamethoxazole-trimethoprim 800-160mg (BACTRIM DS) 800-160 mg Tab  How is the patient currently taking it? (ex. 1XDay):  2xday  Is this a 30 day or 90 day RX:  30  Preferred Pharmacy with phone number:    [facility] #[id number] - [place] [address]  AT [facility] [address]  [address] [place] [address]  Phone: [phone] Fax: [phone]    Local or Mail Order:  local  Ordering Provider:  Dr Laron Chong Call Back Number: [phone] (mobile)  Additional Information:  Patient states he feels like the medication is helping but is still having some pain. Please call patient to advise.Thanks!

## 2024-03-06 NOTE — TELEPHONE ENCOUNTER
----- Message from Rashmi Johnson sent at 3/6/2024  7:11 AM CST -----  Contact: [phone]  Requesting an RX refill or new RX.  Is this a refill or new RX: refill  RX name and strength :  HYDROcodone-acetaminophen (NORCO) 5-325 mg per tablet 20 tablet Yes, quantity medically necessary  Is this a 30 day or 90 day RX:   Pharmacy name and phone #:  [id number] [place] [facility] [place] [address]  AT [facility]   Phone: [phone]  Fax: [phone]     Patient states he is out of medication and is still in pain, please refill    Requesting an RX refill or new RX.  Is this a refill or new RX: refill  RX name and strength :  sulfamethoxazole-trimethoprim 800-160mg (BACTRIM DS) 800-160 mg Tab 20 tablet  Is this a 30 day or 90 day RX:   Pharmacy name and phone #:  [facility] #[id number]  [place] - [address]  AT [facility] [address] [facility]   Phone: [phone]  Fax: [phone]     Patient states he is out of medication and is still in pain, please refill

## 2024-03-08 ENCOUNTER — TELEPHONE (OUTPATIENT)
Dept: INTERNAL MEDICINE | Facility: CLINIC | Age: 69
End: 2024-03-08
Payer: MEDICARE

## 2024-03-08 DIAGNOSIS — N50.811 TESTICULAR PAIN, RIGHT: Primary | ICD-10-CM

## 2024-03-08 NOTE — TELEPHONE ENCOUNTER
Pt is calling he states the dr gave him some medication for his testicle and he states he is still hurting.

## 2024-03-08 NOTE — TELEPHONE ENCOUNTER
----- Message from Magda Hunt sent at 3/8/2024 10:17 AM CST -----  Contact: [phone]  1MEDICALADVICE     Patient is calling for Medical Advice regarding:medication for his testicle     How long has patient had these symptoms:    Pharmacy name and phone#:    Would like response via [employer]:  no     Comments:  Pt is calling he states the dr gave him some medication for his testicle and he states he is still hurting he is asking for the dr to give him a call

## 2024-03-13 ENCOUNTER — OFFICE VISIT (OUTPATIENT)
Dept: UROLOGY | Facility: CLINIC | Age: 69
End: 2024-03-13
Payer: MEDICARE

## 2024-03-13 VITALS
DIASTOLIC BLOOD PRESSURE: 69 MMHG | HEART RATE: 85 BPM | HEIGHT: 66 IN | WEIGHT: 183.44 LBS | SYSTOLIC BLOOD PRESSURE: 130 MMHG | BODY MASS INDEX: 29.48 KG/M2

## 2024-03-13 DIAGNOSIS — R35.1 BENIGN PROSTATIC HYPERPLASIA WITH NOCTURIA: Primary | ICD-10-CM

## 2024-03-13 DIAGNOSIS — N50.811 TESTICULAR PAIN, RIGHT: ICD-10-CM

## 2024-03-13 DIAGNOSIS — N40.1 BENIGN PROSTATIC HYPERPLASIA WITH NOCTURIA: Primary | ICD-10-CM

## 2024-03-13 PROCEDURE — 3044F HG A1C LEVEL LT 7.0%: CPT | Mod: CPTII,S$GLB,,

## 2024-03-13 PROCEDURE — 1160F RVW MEDS BY RX/DR IN RCRD: CPT | Mod: CPTII,S$GLB,,

## 2024-03-13 PROCEDURE — 99213 OFFICE O/P EST LOW 20 MIN: CPT | Mod: S$GLB,,,

## 2024-03-13 PROCEDURE — 3078F DIAST BP <80 MM HG: CPT | Mod: CPTII,S$GLB,,

## 2024-03-13 PROCEDURE — 3075F SYST BP GE 130 - 139MM HG: CPT | Mod: CPTII,S$GLB,,

## 2024-03-13 PROCEDURE — 1125F AMNT PAIN NOTED PAIN PRSNT: CPT | Mod: CPTII,S$GLB,,

## 2024-03-13 PROCEDURE — 1159F MED LIST DOCD IN RCRD: CPT | Mod: CPTII,S$GLB,,

## 2024-03-13 PROCEDURE — 99999 PR PBB SHADOW E&M-EST. PATIENT-LVL V: CPT | Mod: PBBFAC,,,

## 2024-03-13 PROCEDURE — 3008F BODY MASS INDEX DOCD: CPT | Mod: CPTII,S$GLB,,

## 2024-03-13 RX ORDER — IBUPROFEN 600 MG/1
600 TABLET ORAL 3 TIMES DAILY
Qty: 42 TABLET | Refills: 0 | Status: SHIPPED | OUTPATIENT
Start: 2024-03-13 | End: 2024-03-27

## 2024-03-13 NOTE — PROGRESS NOTES
Subjective:       Patient ID: Donald Warren Abadie is a 68 y.o. male.    Chief Complaint: right testicular pain     This is a 68 y.o.  male patient that is new to me.  The patient was referred to me by dr. Larry for right testicular pain. Past history of AFib, EtOH abuse, hypertention, hyperlipidemia, type 2 diabetes, gout. PSH of left partial nephrectomy.   Patient reported right testicular pain to PCP that is a constant pain. Started on Bactrim for 10 day course.  US of scrotum and testicles 2/22/24: right epididymal head cyst  US of abdomen 2/27/24: no evidence of right inguinal hernia.  Urine culture 2/29/24: no growth for infection  GC and Chlamydia: negative  Today patient reports right testicular pain occurring constantly for a month. Pain is worse with pressure, bending over,  and sitting for long period of time. Reports wearing boxer briefs. Reports n/v sometimes but this has been ongoing for some time due to taking metformin. Denies hematuria, penile discharge, penile pain, pelvic pain, flank pain. Denies ever having kidney stones.  Reports that he has been taking hydrocdone and and applying voltaren cream 2-3 times per day which helps. He also has a couple more days of the Bactrim.   Reports nocturia 4x, frequency and urgency. Takes flomax daily in the morning, LUTS symptoms aren't too bothersome. Does report drinking fluids up until bedtime. Denies hematuria, weak stream ,intermittency, dysuria.    LAST PSA  Lab Results   Component Value Date    PSA 3.8 08/09/2017    PSA 1.5 02/06/2015    PSA 1.92 04/04/2013    PSA 1.95 04/25/2012    PSA 1.8 01/12/2011    PSA 1.6 03/23/2010    PSA 2.0 03/09/2009    PSA 1.1 05/28/2007    PSADIAG 1.6 12/01/2017       Lab Results   Component Value Date    CREATININE 1.0 02/29/2024       ---  PMH/PSH/Medications/Allergies/Social history reviewed and as in chart.    Review of Systems   Constitutional:  Negative for activity change, chills and fever.   Respiratory:  Negative  for shortness of breath.    Cardiovascular:  Negative for chest pain and palpitations.   Gastrointestinal:  Negative for abdominal pain and constipation.   Genitourinary:  Positive for frequency, testicular pain (right) and urgency. Negative for difficulty urinating, dysuria, flank pain, hematuria, penile discharge, penile pain, penile swelling and scrotal swelling.   Neurological:  Negative for dizziness and light-headedness.     Objective:      Physical Exam  HENT:      Head: Normocephalic.   Pulmonary:      Effort: Pulmonary effort is normal.   Abdominal:      General: Abdomen is flat.      Palpations: Abdomen is soft.   Genitourinary:     Penis: Normal.       Testes:         Right: Tenderness present. Mass or swelling not present.         Left: Mass, tenderness or swelling not present.      Comments: Tenderness to right head of epididymis.  Musculoskeletal:         General: Normal range of motion.      Cervical back: Normal range of motion.   Skin:     General: Skin is warm and dry.   Neurological:      Mental Status: He is alert and oriented to person, place, and time.       Assessment:     Problem Noted   Testicular Pain, Right 3/13/2024   Benign Prostatic Hyperplasia With Nocturia 3/13/2024       Plan:     Discussed the epididymal cyst on US. Behavioral management and oral medication is usually first treatment. Instructed to wear good scrotal support such as a jock strap especially during activities, apply warm compresses as needed, take 600mg ibuprofen TID for 2 weeks.  Stop applying voltaren to scrotal area.  Stop drinking fluids 2 hours prior to bedtime, continue taking flomax in the morning.  Follow-up 1 month.    ISA Osullivan spent a total of 30 minutes on the day of the visit.This includes face to face time and non-face to face time preparing to see the patient (eg, review of tests), obtaining and/or reviewing separately obtained history, documenting clinical information in the electronic or  other health record, independently interpreting results and communicating results to the patient/family/caregiver, or care coordinator.

## 2024-03-13 NOTE — PATIENT INSTRUCTIONS
Take 600mg ibuprofen three times per day for 2 weeks.  Use good scrotal support, wear a jock strap.  Apply warm compress in the morning and at night or when needed.

## 2024-03-25 ENCOUNTER — PATIENT MESSAGE (OUTPATIENT)
Dept: INTERNAL MEDICINE | Facility: CLINIC | Age: 69
End: 2024-03-25
Payer: MEDICARE

## 2024-03-27 ENCOUNTER — OFFICE VISIT (OUTPATIENT)
Dept: INTERNAL MEDICINE | Facility: CLINIC | Age: 69
End: 2024-03-27
Payer: MEDICARE

## 2024-03-27 VITALS
DIASTOLIC BLOOD PRESSURE: 64 MMHG | SYSTOLIC BLOOD PRESSURE: 118 MMHG | HEART RATE: 80 BPM | OXYGEN SATURATION: 99 % | HEIGHT: 66 IN | BODY MASS INDEX: 29.48 KG/M2 | WEIGHT: 183.44 LBS

## 2024-03-27 DIAGNOSIS — I48.91 ATRIAL FIBRILLATION, UNSPECIFIED TYPE: Primary | ICD-10-CM

## 2024-03-27 DIAGNOSIS — N45.1 EPIDIDYMITIS, RIGHT: ICD-10-CM

## 2024-03-27 DIAGNOSIS — F10.10 ALCOHOL ABUSE: ICD-10-CM

## 2024-03-27 PROCEDURE — 3008F BODY MASS INDEX DOCD: CPT | Mod: CPTII,S$GLB,, | Performed by: INTERNAL MEDICINE

## 2024-03-27 PROCEDURE — 99213 OFFICE O/P EST LOW 20 MIN: CPT | Mod: S$GLB,,, | Performed by: INTERNAL MEDICINE

## 2024-03-27 PROCEDURE — 3044F HG A1C LEVEL LT 7.0%: CPT | Mod: CPTII,S$GLB,, | Performed by: INTERNAL MEDICINE

## 2024-03-27 PROCEDURE — 3288F FALL RISK ASSESSMENT DOCD: CPT | Mod: CPTII,S$GLB,, | Performed by: INTERNAL MEDICINE

## 2024-03-27 PROCEDURE — 3078F DIAST BP <80 MM HG: CPT | Mod: CPTII,S$GLB,, | Performed by: INTERNAL MEDICINE

## 2024-03-27 PROCEDURE — 3074F SYST BP LT 130 MM HG: CPT | Mod: CPTII,S$GLB,, | Performed by: INTERNAL MEDICINE

## 2024-03-27 PROCEDURE — 1101F PT FALLS ASSESS-DOCD LE1/YR: CPT | Mod: CPTII,S$GLB,, | Performed by: INTERNAL MEDICINE

## 2024-03-27 PROCEDURE — 1159F MED LIST DOCD IN RCRD: CPT | Mod: CPTII,S$GLB,, | Performed by: INTERNAL MEDICINE

## 2024-03-27 PROCEDURE — 99999 PR PBB SHADOW E&M-EST. PATIENT-LVL V: CPT | Mod: PBBFAC,,, | Performed by: INTERNAL MEDICINE

## 2024-03-27 PROCEDURE — 1125F AMNT PAIN NOTED PAIN PRSNT: CPT | Mod: CPTII,S$GLB,, | Performed by: INTERNAL MEDICINE

## 2024-03-27 PROCEDURE — 1160F RVW MEDS BY RX/DR IN RCRD: CPT | Mod: CPTII,S$GLB,, | Performed by: INTERNAL MEDICINE

## 2024-03-27 RX ORDER — HYDROCODONE BITARTRATE AND ACETAMINOPHEN 5; 325 MG/1; MG/1
1 TABLET ORAL EVERY 12 HOURS PRN
Qty: 30 TABLET | Refills: 0 | Status: SHIPPED | OUTPATIENT
Start: 2024-03-27 | End: 2024-05-23 | Stop reason: SDUPTHER

## 2024-03-27 NOTE — PROGRESS NOTES
CC: Right testicular pain     HPI:  The patient is a 68-year-old male with AFib, alcohol abuse, hypertension, hyperlipidemia, elevated triglycerides, type 2 diabetes, reflux, gout, renal cell cancer status post nephrectomy and anxiety presents today for follow-up of right testicular pain.  The patient was treated with Bactrim and given Vicodin for pain.  He was referred to urology.  He was placed on ibuprofen.  He reports his symptoms have not improved.  He still has pain.  He had not get scrotal support as recommended by Urology nor did he schedule a follow-up.    The patient reports no fever chills.  The patient reports he still drinks about 4-6 beers a day.    Physical exam:   General appearance:  No acute distress   HEENT: Trachea is midline without JVD.  Pulmonary:  Good inspiratory, expiratory breath sounds are heard.  Lungs are clear auscultation.  Cardiovascular:  S1-S2, rhythm is regular.  Extremities without edema.    GI: Abdomen is nontender, nondistended without hepatosplenomegaly  : Testicular exam was performed.  No abnormalities were felt.  The patient did not have any discomfort on examination today.    Assessment:  1. Epididymitis involving the right testicle  2. ETOH abuse  3.  AFib    Plan:  1. Will prescribe the patient 30 tablets of Vicodin  2.  Refer the patient to urology

## 2024-03-28 ENCOUNTER — OFFICE VISIT (OUTPATIENT)
Dept: UROLOGY | Facility: CLINIC | Age: 69
End: 2024-03-28
Payer: MEDICARE

## 2024-03-28 ENCOUNTER — HOSPITAL ENCOUNTER (OUTPATIENT)
Dept: RADIOLOGY | Facility: HOSPITAL | Age: 69
Discharge: HOME | End: 2024-03-28
Payer: MEDICARE

## 2024-03-28 VITALS
HEIGHT: 66 IN | SYSTOLIC BLOOD PRESSURE: 142 MMHG | DIASTOLIC BLOOD PRESSURE: 77 MMHG | BODY MASS INDEX: 29.92 KG/M2 | HEART RATE: 87 BPM | WEIGHT: 186.19 LBS

## 2024-03-28 DIAGNOSIS — R10.31 RIGHT GROIN PAIN: Primary | ICD-10-CM

## 2024-03-28 DIAGNOSIS — N45.1 EPIDIDYMITIS, RIGHT: ICD-10-CM

## 2024-03-28 DIAGNOSIS — R10.31 RIGHT GROIN PAIN: ICD-10-CM

## 2024-03-28 PROCEDURE — 3077F SYST BP >= 140 MM HG: CPT | Mod: CPTII,S$GLB,,

## 2024-03-28 PROCEDURE — 3044F HG A1C LEVEL LT 7.0%: CPT | Mod: CPTII,S$GLB,,

## 2024-03-28 PROCEDURE — 1126F AMNT PAIN NOTED NONE PRSNT: CPT | Mod: CPTII,S$GLB,,

## 2024-03-28 PROCEDURE — 1160F RVW MEDS BY RX/DR IN RCRD: CPT | Mod: CPTII,S$GLB,,

## 2024-03-28 PROCEDURE — 1101F PT FALLS ASSESS-DOCD LE1/YR: CPT | Mod: CPTII,S$GLB,,

## 2024-03-28 PROCEDURE — 3288F FALL RISK ASSESSMENT DOCD: CPT | Mod: CPTII,S$GLB,,

## 2024-03-28 PROCEDURE — 76770 US EXAM ABDO BACK WALL COMP: CPT | Mod: TC

## 2024-03-28 PROCEDURE — 99999 PR PBB SHADOW E&M-EST. PATIENT-LVL V: CPT | Mod: PBBFAC,,,

## 2024-03-28 PROCEDURE — 3008F BODY MASS INDEX DOCD: CPT | Mod: CPTII,S$GLB,,

## 2024-03-28 PROCEDURE — 76770 US EXAM ABDO BACK WALL COMP: CPT | Mod: 26,,, | Performed by: RADIOLOGY

## 2024-03-28 PROCEDURE — 3078F DIAST BP <80 MM HG: CPT | Mod: CPTII,S$GLB,,

## 2024-03-28 PROCEDURE — 99214 OFFICE O/P EST MOD 30 MIN: CPT | Mod: S$GLB,,,

## 2024-03-28 PROCEDURE — 1159F MED LIST DOCD IN RCRD: CPT | Mod: CPTII,S$GLB,,

## 2024-03-28 NOTE — PROGRESS NOTES
"CHIEF COMPLAINT:  Testicle pain follow up      HISTORY OF PRESENTING ILLINESS:  This is a 68 y.o. male patient that is new to me. He was seen 3/13/2024 by KOSTAS Quinones NP for R testicular pain. Presents today for follow up. Patient states he has been taking hydrocodone for R testicle pain which improves pain. He would like a refill. He was treated with Bactrim. No personal hx of kidney stones. Dad may have made kidney stones. No urinary complaints.    US of scrotum and testicles 2/22/24: right epididymal head cyst  US of abdomen 2/27/24: no evidence of right inguinal hernia.  Urine culture 2/29/24: no growth for infection  GC and Chlamydia: negative      REVIEW OF SYSTEMS:  Review of Systems   Constitutional:  Negative for chills and fever.   HENT:  Positive for hearing loss. Negative for congestion and sore throat.    Respiratory:  Negative for cough and shortness of breath.    Cardiovascular:  Negative for chest pain and palpitations.   Gastrointestinal:  Negative for nausea and vomiting.   Genitourinary:  Negative for dysuria, flank pain, frequency, hematuria and urgency.   Neurological:  Negative for dizziness and headaches.         PATIENT HISTORY:    Past Medical History:   Diagnosis Date    A-fib     Alcohol abuse     Anxiety     Arthritis     Chronic gout     Diabetes mellitus     DM (diabetes mellitus) 11/16/2016    "boarderline, not taking any meds currently"    Fatty liver     Hyperlipidemia     Renal cell cancer 2014    S/P TKR (total knee replacement), right 06/27/2019       Past Surgical History:   Procedure Laterality Date    ABSCESS DRAINAGE      perirectal    COLONOSCOPY      HAND SURGERY Left     JOINT REPLACEMENT      knee replacement right knee    KIDNEY SURGERY      partial left kidney removal - [place]    PARTIAL NEPHRECTOMY Left August 2014    PERCUTANEOUS CRYOTHERAPY OF PERIPHERAL NERVE USING LIQUID NITROUS OXIDE IN CLOSED NEEDLE DEVICE Right 6/17/2019    Procedure: CRYOTHERAPY, NERVE, PERIPHERAL, " PERCUTANEOUS, USING LIQUID NITROUS OXIDE IN CLOSED NEEDLE DEVICE-right knee iovera;  Surgeon: Donny Hair III, MD;  Location: [facility] CATH LAB;  Service: Pain Management;  Laterality: Right;    TOTAL KNEE ARTHROPLASTY Right 6/27/2019    Procedure: ARTHROPLASTY, KNEE, TOTAL-SAME DAY;  Surgeon: Mikael Huerta MD;  Location: [facility] OR [facility] FLR;  Service: Orthopedics;  Laterality: Right;       Family History   Problem Relation Age of Onset    Lung cancer Father     Colon cancer Father     Cancer Father         lung cancer    Diabetes Mother     Alzheimer's disease Mother     Lung cancer Sister     Cancer Sister         lung    Colon polyps Brother     Alcohol abuse Brother     Alzheimer's disease Brother     Cirrhosis Neg Hx        Social History     Socioeconomic History    Marital status: Single   Occupational History     Employer: [employer]   Tobacco Use    Smoking status: Never    Smokeless tobacco: Never   Substance and Sexual Activity    Alcohol use: Yes     Alcohol/week: 84.0 standard drinks of alcohol     Types: 84 Cans of beer per week     Comment: 12 beers daily    Drug use: No     Social Determinants of Health     Financial Resource Strain: Low Risk  (10/28/2023)    Overall Financial Resource Strain (CARDIA)     Difficulty of Paying Living Expenses: Not hard at all   Food Insecurity: No Food Insecurity (10/28/2023)    Hunger Vital Sign     Worried About Running Out of Food in the Last Year: Never true     Ran Out of Food in the Last Year: Never true   Transportation Needs: No Transportation Needs (10/28/2023)    PRAPARE - Transportation     Lack of Transportation (Medical): No     Lack of Transportation (Non-Medical): No   Physical Activity: Inactive (10/28/2023)    Exercise Vital Sign     Days of Exercise per Week: 0 days     Minutes of Exercise per Session: 0 min   Stress: No Stress Concern Present (10/28/2023)    [employer] [place] of Occupational Health - Occupational Stress  Questionnaire     Feeling of Stress : Not at all   Social Connections: Socially Isolated (10/28/2023)    Social Connection and Isolation Panel [NHANES]     Frequency of Communication with Friends and Family: More than three times a week     Frequency of Social Gatherings with Friends and Family: More than three times a week     Attends Hinduism Services: Never     Active Member of Clubs or Organizations: No     Attends Club or Organization Meetings: Never     Marital Status:    Housing Stability: Low Risk  (10/28/2023)    Housing Stability Vital Sign     Unable to Pay for Housing in the Last Year: No     Number of Places Lived in the Last Year: 1     Unstable Housing in the Last Year: No       Allergies:  No known drug allergies    Medications:    Current Outpatient Medications:     allopurinoL (ZYLOPRIM) 100 MG tablet, TAKE 2 TABLETS(200 MG) BY MOUTH EVERY DAY, Disp: 180 tablet, Rfl: 0    ALPRAZolam (XANAX) 0.5 MG tablet, Take 1 tablet (0.5 mg total) by mouth nightly as needed for Anxiety., Disp: 30 tablet, Rfl: 0    cyanocobalamin (VITAMIN B-12) 1000 MCG tablet, Take 1 tablet (1,000 mcg total) by mouth once daily., Disp: , Rfl:     diclofenac sodium (VOLTAREN) 1 % Gel, Apply 2 g topically 3 (three) times daily as needed (Right knee - hand pain)., Disp: , Rfl:     dronedarone (MULTAQ) 400 mg Tab, Take 1 tablet (400 mg total) by mouth 2 (two) times daily with meals., Disp: 60 tablet, Rfl: 11    ELIQUIS 5 mg Tab, TAKE 1 TABLET BY MOUTH TWICE DAILY (Patient taking differently: Take 5 mg by mouth 2 (two) times daily.), Disp: 180 tablet, Rfl: 3    HYDROcodone-acetaminophen (NORCO) 5-325 mg per tablet, Take 1 tablet by mouth every 12 (twelve) hours as needed for Pain., Disp: 30 tablet, Rfl: 0    ketoconazole (NIZORAL) 2 % cream, Apply topically once daily. Apply to affected skin from toes to heels twice a day for 3-4 weeks., Disp: 60 g, Rfl: 6    magnesium oxide (MAG-OX) 400 mg (241.3 mg magnesium) tablet, Take  1 tablet (400 mg total) by mouth once daily., Disp: 14 tablet, Rfl: 0    metFORMIN (GLUCOPHAGE-XR) 500 MG ER 24hr tablet, Take 2 tablets (1,000 mg total) by mouth 2 (two) times daily with meals., Disp: 360 tablet, Rfl: 3    metoprolol succinate (TOPROL-XL) 25 MG 24 hr tablet, Take 1 tablet (25 mg total) by mouth once daily., Disp: 90 tablet, Rfl: 3    multivitamin (THERAGRAN) tablet, Take 1 tablet by mouth once daily., Disp: , Rfl:     omega-3 acid ethyl esters (LOVAZA) 1 gram capsule, Take 2 g by mouth 2 (two) times daily., Disp: , Rfl:     pantoprazole (PROTONIX) 40 MG tablet, Take 1 tablet (40 mg total) by mouth once daily., Disp: 90 tablet, Rfl: 3    rOPINIRole (REQUIP) 1 MG tablet, TAKE 1 TABLET(1 MG) BY MOUTH EVERY EVENING, Disp: 90 tablet, Rfl: 3    rosuvastatin (CRESTOR) 20 MG tablet, TAKE 1 TABLET(20 MG) BY MOUTH EVERY DAY, Disp: 90 tablet, Rfl: 3    sertraline (ZOLOFT) 50 MG tablet, Take 1 tablet (50 mg total) by mouth once daily., Disp: 30 tablet, Rfl: 11    tamsulosin (FLOMAX) 0.4 mg Cap, TAKE 1 CAPSULE(0.4 MG) BY MOUTH EVERY DAY (Patient taking differently: Take 0.4 mg by mouth every evening.), Disp: 90 capsule, Rfl: 3    VITAMIN B-1 100 MG tablet, TAKE 1 TABLET BY MOUTH ONCE DAILY, Disp: 30 tablet, Rfl: 2    folic acid (FOLVITE) 1 MG tablet, Take 1 tablet (1 mg total) by mouth once daily., Disp: 30 tablet, Rfl: 11    PHYSICAL EXAMINATION:  Physical Exam  HENT:      Head: Normocephalic and atraumatic.      Right Ear: External ear normal.      Left Ear: External ear normal.      Nose: Nose normal.      Mouth/Throat:      Mouth: Mucous membranes are moist.   Pulmonary:      Effort: Pulmonary effort is normal. No respiratory distress.   Genitourinary:     Penis: Normal.       Testes: Normal.   Skin:     General: Skin is warm and dry.   Neurological:      General: No focal deficit present.      Mental Status: He is alert and oriented to person, place, and time.   Psychiatric:         Mood and Affect: Mood  normal.         Behavior: Behavior normal.           LABS:  Unable to provide sample      Lab Results   Component Value Date    PSA 3.8 08/09/2017    PSA 1.5 02/06/2015    PSA 1.92 04/04/2013    PSADIAG 1.6 12/01/2017       Lab Results   Component Value Date    CREATININE 1.0 02/29/2024    EGFRNORACEVR >60.0 02/29/2024               IMPRESSION:    Encounter Diagnoses   Name Primary?    Right groin pain Yes    Epididymitis, right          Assessment:       1. Right groin pain    2. Epididymitis, right        Plan:   - Normal physical exam and testicular US. Informed him I would not prescribe narcotic pain medication for the pain he is experiencing. I can place a referral to pain management if interested, declined. Testicle pain precautions and recommendations provided. Recommended pelvic floor rest as well as no heavy lifting for at least 2 weeks.    RTC PRN    I spent 30 minutes with the patient of which more than half was spent in direct consultation with the patient in regards to our treatment and plan.  We addressed the office findings and recent labs; any need to go ER today.   Education and recommendations of today's plan of care including home remedies and needed follow up with PCP.   We discussed the chief complaints; reviewed the LUTS and the possible contributory factors.

## 2024-03-28 NOTE — PATIENT INSTRUCTIONS
Good scrotal support - wear jock strap over underwear   Use ice for pain as needed - barrier between skin and ice pack, 15 min on, 45 min off intermittently  NSAIDs for pain as needed - risk of gastric ulcers with NSAID and instructed patient to take with food.   Use rolled dishtowel to elevate scrotum x1 hour at night to help decrease pain and swelling  Quercetin/tumeric, as needed, indefinitely, for testicle pain/discomfort  No heavy lifting over 10 lbs for 2 weeks.    Epididymal cysts are fluid filled sacs that develop in the Epididymis (where sperm from the testis collect before moving along the vas). They occur for a number of reasons but are always benign and generally cause mild discomfort only. They are not known to be associated with cancer.

## 2024-04-01 ENCOUNTER — NURSE TRIAGE (OUTPATIENT)
Dept: ADMINISTRATIVE | Facility: CLINIC | Age: 69
End: 2024-04-01
Payer: MEDICARE

## 2024-04-01 ENCOUNTER — HOSPITAL ENCOUNTER (EMERGENCY)
Facility: HOSPITAL | Age: 69
Discharge: HOME | End: 2024-04-01
Attending: EMERGENCY MEDICINE
Payer: MEDICARE

## 2024-04-01 VITALS
HEART RATE: 81 BPM | WEIGHT: 180 LBS | DIASTOLIC BLOOD PRESSURE: 79 MMHG | OXYGEN SATURATION: 96 % | HEIGHT: 66 IN | TEMPERATURE: 98 F | SYSTOLIC BLOOD PRESSURE: 129 MMHG | BODY MASS INDEX: 28.93 KG/M2 | RESPIRATION RATE: 16 BRPM

## 2024-04-01 DIAGNOSIS — R74.8 ELEVATED LIVER ENZYMES: ICD-10-CM

## 2024-04-01 DIAGNOSIS — N50.811 RIGHT TESTICULAR PAIN: ICD-10-CM

## 2024-04-01 DIAGNOSIS — I48.11 LONGSTANDING PERSISTENT ATRIAL FIBRILLATION: ICD-10-CM

## 2024-04-01 DIAGNOSIS — K29.80 DUODENITIS: ICD-10-CM

## 2024-04-01 DIAGNOSIS — E87.1 HYPONATREMIA: Primary | ICD-10-CM

## 2024-04-01 DIAGNOSIS — R10.9 ABDOMINAL PAIN, UNSPECIFIED ABDOMINAL LOCATION: ICD-10-CM

## 2024-04-01 DIAGNOSIS — R00.0 TACHYCARDIA: ICD-10-CM

## 2024-04-01 LAB
ALBUMIN SERPL BCP-MCNC: 4.4 G/DL (ref 3.5–5.2)
ALP SERPL-CCNC: 91 U/L (ref 55–135)
ALT SERPL W/O P-5'-P-CCNC: 58 U/L (ref 10–44)
ANION GAP SERPL CALC-SCNC: 16 MMOL/L (ref 8–16)
AST SERPL-CCNC: 115 U/L (ref 10–40)
BASOPHILS # BLD AUTO: 0.07 K/UL (ref 0–0.2)
BASOPHILS NFR BLD: 0.9 % (ref 0–1.9)
BILIRUB SERPL-MCNC: 0.4 MG/DL (ref 0.1–1)
BILIRUB UR QL STRIP: NEGATIVE
BUN SERPL-MCNC: 8 MG/DL (ref 8–23)
CALCIUM SERPL-MCNC: 9.5 MG/DL (ref 8.7–10.5)
CHLORIDE SERPL-SCNC: 94 MMOL/L (ref 95–110)
CLARITY UR REFRACT.AUTO: CLEAR
CO2 SERPL-SCNC: 18 MMOL/L (ref 23–29)
COLOR UR AUTO: YELLOW
CREAT SERPL-MCNC: 0.9 MG/DL (ref 0.5–1.4)
DIFFERENTIAL METHOD BLD: ABNORMAL
EOSINOPHIL # BLD AUTO: 0.1 K/UL (ref 0–0.5)
EOSINOPHIL NFR BLD: 0.9 % (ref 0–8)
ERYTHROCYTE [DISTWIDTH] IN BLOOD BY AUTOMATED COUNT: 16.1 % (ref 11.5–14.5)
EST. GFR  (NO RACE VARIABLE): >60 ML/MIN/1.73 M^2
GLUCOSE SERPL-MCNC: 122 MG/DL (ref 70–110)
GLUCOSE UR QL STRIP: NEGATIVE
HCT VFR BLD AUTO: 47.6 % (ref 40–54)
HGB BLD-MCNC: 16.2 G/DL (ref 14–18)
HGB UR QL STRIP: NEGATIVE
IMM GRANULOCYTES # BLD AUTO: 0.06 K/UL (ref 0–0.04)
IMM GRANULOCYTES NFR BLD AUTO: 0.8 % (ref 0–0.5)
KETONES UR QL STRIP: NEGATIVE
LEUKOCYTE ESTERASE UR QL STRIP: NEGATIVE
LIPASE SERPL-CCNC: 37 U/L (ref 4–60)
LYMPHOCYTES # BLD AUTO: 2 K/UL (ref 1–4.8)
LYMPHOCYTES NFR BLD: 26 % (ref 18–48)
MCH RBC QN AUTO: 30.3 PG (ref 27–31)
MCHC RBC AUTO-ENTMCNC: 34 G/DL (ref 32–36)
MCV RBC AUTO: 89 FL (ref 82–98)
MONOCYTES # BLD AUTO: 0.9 K/UL (ref 0.3–1)
MONOCYTES NFR BLD: 11.9 % (ref 4–15)
NEUTROPHILS # BLD AUTO: 4.6 K/UL (ref 1.8–7.7)
NEUTROPHILS NFR BLD: 59.5 % (ref 38–73)
NITRITE UR QL STRIP: NEGATIVE
NRBC BLD-RTO: 0 /100 WBC
PH UR STRIP: 5 [PH] (ref 5–8)
PLATELET # BLD AUTO: 223 K/UL (ref 150–450)
PMV BLD AUTO: 9.1 FL (ref 9.2–12.9)
POTASSIUM SERPL-SCNC: 4.6 MMOL/L (ref 3.5–5.1)
PROT SERPL-MCNC: 8.5 G/DL (ref 6–8.4)
PROT UR QL STRIP: NEGATIVE
RBC # BLD AUTO: 5.34 M/UL (ref 4.6–6.2)
SODIUM SERPL-SCNC: 128 MMOL/L (ref 136–145)
SP GR UR STRIP: 1.01 (ref 1–1.03)
URN SPEC COLLECT METH UR: NORMAL
WBC # BLD AUTO: 7.65 K/UL (ref 3.9–12.7)

## 2024-04-01 PROCEDURE — 25000003 PHARM REV CODE 250: Performed by: EMERGENCY MEDICINE

## 2024-04-01 PROCEDURE — 99285 EMERGENCY DEPT VISIT HI MDM: CPT | Mod: 25

## 2024-04-01 PROCEDURE — 63600175 PHARM REV CODE 636 W HCPCS: Performed by: EMERGENCY MEDICINE

## 2024-04-01 PROCEDURE — 96375 TX/PRO/DX INJ NEW DRUG ADDON: CPT

## 2024-04-01 PROCEDURE — 96374 THER/PROPH/DIAG INJ IV PUSH: CPT | Mod: 59

## 2024-04-01 PROCEDURE — 81003 URINALYSIS AUTO W/O SCOPE: CPT | Performed by: STUDENT IN AN ORGANIZED HEALTH CARE EDUCATION/TRAINING PROGRAM

## 2024-04-01 PROCEDURE — 83690 ASSAY OF LIPASE: CPT | Performed by: EMERGENCY MEDICINE

## 2024-04-01 PROCEDURE — 96361 HYDRATE IV INFUSION ADD-ON: CPT

## 2024-04-01 PROCEDURE — 96376 TX/PRO/DX INJ SAME DRUG ADON: CPT

## 2024-04-01 PROCEDURE — 96372 THER/PROPH/DIAG INJ SC/IM: CPT | Mod: 59 | Performed by: EMERGENCY MEDICINE

## 2024-04-01 PROCEDURE — 93005 ELECTROCARDIOGRAM TRACING: CPT

## 2024-04-01 PROCEDURE — 80053 COMPREHEN METABOLIC PANEL: CPT | Performed by: STUDENT IN AN ORGANIZED HEALTH CARE EDUCATION/TRAINING PROGRAM

## 2024-04-01 PROCEDURE — 93010 ELECTROCARDIOGRAM REPORT: CPT | Mod: ,,, | Performed by: INTERNAL MEDICINE

## 2024-04-01 PROCEDURE — 25500020 PHARM REV CODE 255: Performed by: EMERGENCY MEDICINE

## 2024-04-01 PROCEDURE — 85025 COMPLETE CBC W/AUTO DIFF WBC: CPT | Performed by: STUDENT IN AN ORGANIZED HEALTH CARE EDUCATION/TRAINING PROGRAM

## 2024-04-01 RX ORDER — DIAZEPAM 10 MG/2ML
5 INJECTION INTRAMUSCULAR
Status: COMPLETED | OUTPATIENT
Start: 2024-04-01 | End: 2024-04-01

## 2024-04-01 RX ORDER — KETOROLAC TROMETHAMINE 30 MG/ML
15 INJECTION, SOLUTION INTRAMUSCULAR; INTRAVENOUS
Status: COMPLETED | OUTPATIENT
Start: 2024-04-01 | End: 2024-04-01

## 2024-04-01 RX ORDER — ONDANSETRON HYDROCHLORIDE 2 MG/ML
4 INJECTION, SOLUTION INTRAVENOUS
Status: COMPLETED | OUTPATIENT
Start: 2024-04-01 | End: 2024-04-01

## 2024-04-01 RX ORDER — METOPROLOL SUCCINATE 25 MG/1
25 TABLET, EXTENDED RELEASE ORAL
Status: COMPLETED | OUTPATIENT
Start: 2024-04-01 | End: 2024-04-01

## 2024-04-01 RX ORDER — METOPROLOL TARTRATE 1 MG/ML
5 INJECTION, SOLUTION INTRAVENOUS
Status: COMPLETED | OUTPATIENT
Start: 2024-04-01 | End: 2024-04-01

## 2024-04-01 RX ADMIN — ONDANSETRON 4 MG: 2 INJECTION INTRAMUSCULAR; INTRAVENOUS at 05:04

## 2024-04-01 RX ADMIN — KETOROLAC TROMETHAMINE 15 MG: 30 INJECTION, SOLUTION INTRAMUSCULAR; INTRAVENOUS at 02:04

## 2024-04-01 RX ADMIN — DIAZEPAM 5 MG: 10 INJECTION, SOLUTION INTRAMUSCULAR; INTRAVENOUS at 05:04

## 2024-04-01 RX ADMIN — METOROPROLOL TARTRATE 5 MG: 5 INJECTION, SOLUTION INTRAVENOUS at 08:04

## 2024-04-01 RX ADMIN — SODIUM CHLORIDE 1000 ML: 9 INJECTION, SOLUTION INTRAVENOUS at 05:04

## 2024-04-01 RX ADMIN — METOROPROLOL TARTRATE 5 MG: 5 INJECTION, SOLUTION INTRAVENOUS at 09:04

## 2024-04-01 RX ADMIN — METOPROLOL SUCCINATE 25 MG: 25 TABLET, EXTENDED RELEASE ORAL at 07:04

## 2024-04-01 RX ADMIN — IOHEXOL 100 ML: 350 INJECTION, SOLUTION INTRAVENOUS at 06:04

## 2024-04-01 NOTE — TELEPHONE ENCOUNTER
Pt has right testicle pain that has been constant for 2 weeks but is worse. Pt has been able to urinate. Pt has swelling to the testicle. Pt has seen pcp and urology. Pt reports pain is worse and severe. Care advice to be seen in ER now. Pt verbalized understanding.   Reason for Disposition   SEVERE pain (e.g., excruciating)    Additional Information   Negative: Rash or color change of scrotum BUT no swelling or pain   Negative: Inguinal hernia pain (e.g., known hernia previously diagnosed by a doctor or NP/PA)   Negative: Followed a genital area injury (e.g., penis, scrotum)   Negative: Swelling of scrotum is main symptom    Protocols used: Scrotum Pain-A-OH

## 2024-04-01 NOTE — ED PROVIDER NOTES
"Encounter Date: 4/1/2024       History     Chief Complaint   Patient presents with    Testicle Pain     R testicle pain and swelling , seen by urologist twice, told has cyst, now more swelling     HPI  68-year-old male with past medical history as noted below, coming in with 2-3 weeks of right-sided testicular pain.  Seen by primary care x2, urology x2, treat with course of Bactrim which did not help, outpatient ultrasound of the testicles, ultrasound of the abdomen as noted below, urine studies have been negative, GC chlamydia have been negative.  Only finding was subcentimeter right epididymal cyst.    He has been taking hydrocodone / acetaminophen for his symptoms with no improvement in symptoms.  Today felt like the pain was worse so came to the emergency department.    No fevers.  No dysuria, no frequency, no penile discharge, not sexually active.  Denies trauma to the testicles.    He does say he has an upset stomach but does not feel like it is related to his testicle pain, states that he has felt like that chronically which she attributes to his metformin usage.  He occasionally vomits maybe once a week.     Son in law does confirm that the patient drinks alcohol daily.  Patient endorses also eating food denies getting nutrition only from alcohol.    Review of patient's allergies indicates:   Allergen Reactions    No known drug allergies      Past Medical History:   Diagnosis Date    A-fib     Alcohol abuse     Anxiety     Arthritis     Chronic gout     Diabetes mellitus     DM (diabetes mellitus) 11/16/2016    "boarderline, not taking any meds currently"    Fatty liver     Hyperlipidemia     Renal cell cancer 2014    S/P TKR (total knee replacement), right 06/27/2019     Past Surgical History:   Procedure Laterality Date    ABSCESS DRAINAGE      perirectal    COLONOSCOPY      HAND SURGERY Left     JOINT REPLACEMENT      knee replacement right knee    KIDNEY SURGERY      partial left kidney removal - [place]    " PARTIAL NEPHRECTOMY Left August 2014    PERCUTANEOUS CRYOTHERAPY OF PERIPHERAL NERVE USING LIQUID NITROUS OXIDE IN CLOSED NEEDLE DEVICE Right 6/17/2019    Procedure: CRYOTHERAPY, NERVE, PERIPHERAL, PERCUTANEOUS, USING LIQUID NITROUS OXIDE IN CLOSED NEEDLE DEVICE-right knee iovera;  Surgeon: Donny Hair III, MD;  Location: [facility] CATH LAB;  Service: Pain Management;  Laterality: Right;    TOTAL KNEE ARTHROPLASTY Right 6/27/2019    Procedure: ARTHROPLASTY, KNEE, TOTAL-SAME DAY;  Surgeon: Mikael Huerta MD;  [facility];  Service: Orthopedics;  Laterality: Right;     Family History   Problem Relation Age of Onset    Lung cancer Father     Colon cancer Father     Cancer Father         lung cancer    Diabetes Mother     Alzheimer's disease Mother     Lung cancer Sister     Cancer Sister         lung    Colon polyps Brother     Alcohol abuse Brother     Alzheimer's disease Brother     Cirrhosis Neg Hx      Social History     Tobacco Use    Smoking status: Never    Smokeless tobacco: Never   Substance Use Topics    Alcohol use: Yes     Alcohol/week: 84.0 standard drinks of alcohol     Types: 84 Cans of beer per week     Comment: 12 beers daily    Drug use: No     Review of Systems    Physical Exam     Initial Vitals [04/01/24 1244]   BP Pulse Resp Temp SpO2   (!) 152/89 105 18 97.9 °F (36.6 °C) 96 %      MAP       --         Physical Exam    Physical Exam:  CONSTITUTIONAL: Well developed, well nourished, in no acute distress.  Very hard of hearing.  HENT: Normocephalic, atraumatic    EYES: Sclerae anicteric   NECK: Supple, no thyroid enlargement  RESPIRATORY: Speaking in full sentences. Breathing comfortably.   ABDOMEN: Soft, nonspecific mild discomfort to palpation throughout.  No localizing tenderness, no masses, no rebound or guarding   :  Penis is unremarkable, no masses, bulging, hernias seen.  Testicles with no significant swelling, there is point tenderness on the epididymis above the  right testicle, right testicle itself is not swollen or tender.   Does not appear to be significant localizing redness. Area posterior to the scrotum with no skin changes, no duskiness no bullae, no rash.  NEUROLOGIC: Alert, interacting normally. No facial droop.   MSK: Moving all four extremities.  Skin: Warm and dry. No visible rash on exposed areas of skin.    Psych: Mood and affect normal.       ED Course   Procedures  Labs Reviewed   CBC W/ AUTO DIFFERENTIAL - Abnormal; Notable for the following components:       Result Value    RDW 16.1 (*)     MPV 9.1 (*)     Immature Granulocytes 0.8 (*)     Immature Grans (Abs) 0.06 (*)     All other components within normal limits   COMPREHENSIVE METABOLIC PANEL - Abnormal; Notable for the following components:    Sodium 128 (*)     Chloride 94 (*)     CO2 18 (*)     Glucose 122 (*)     Total Protein 8.5 (*)      (*)     ALT 58 (*)     All other components within normal limits   URINALYSIS, REFLEX TO URINE CULTURE    Narrative:     Specimen Source->Urine   LIPASE          Imaging Results               CT Abdomen Pelvis With IV Contrast NO Oral Contrast (Final result)  Result time 04/01/24 19:01:28      Final result by Vinny Mcneal MD (04/01/24 19:01:28)                   Impression:      This report was flagged in Epic as abnormal.    1. Wall thickening and inflammation involving the 1st portion of the duodenum concerning for duodenitis.  Correlation is advised.  Direct visualization as warranted.  2. The urinary bladder is distended noting prostatomegaly.  This is likely on the basis of urinary retention or outlet obstruction however correlation with urinalysis recommended to exclude superimposed infection.  This likely accounts for prominence of the bilateral renal collecting systems.  3. Hepatomegaly, correlation with LFTs recommended.  4. Please see above for several additional findings.      Electronically signed by: Vinny Mcneal  MD  Date:    04/01/2024  Time:    19:01               Narrative:    EXAMINATION:  CT ABDOMEN PELVIS WITH IV CONTRAST    CLINICAL HISTORY:  Abdominal pain, acute, nonlocalized;vomiting.;    TECHNIQUE:  Low dose axial images, sagittal and coronal reformations were obtained from the lung bases to the pubic symphysis following the IV administration of 100 mL of Omnipaque 350 .  Oral contrast was not given.    COMPARISON:  Ultrasound 03/28/2024, CT abdomen and pelvis 02/28/2019    FINDINGS:  images of the lower thorax are unremarkable.    The liver is hypoattenuating suggesting steatosis, correlation with LFTs recommended. The spleen, pancreas, adrenal glands and gallbladder are unremarkable. The portal vein, splenic vein, SMV, celiac axis and SMA all are patent. The stomach is decompressed without wall thickening. There is wall thickening and mild inflammation involving the 1st portion of the duodenum, concerning for duodenitis. No obstruction. There are a few scattered prominent adjacent cornell hepatic lymph nodes, suggesting reactive change.    The kidneys enhance symmetrically without nephrolithiasis. There is mild prominence of the bilateral renal collecting systems, right greater than left. Low attenuating lesions arise from the kidneys, too small for characterization, please see ultrasound 03/28/2024. There is mild bilateral perinephric fat stranding.  Bilateral ureters are mildly prominent, no calculi seen along their course. The urinary bladder is distended without wall thickening. The prostate is enlarged. There are bilateral fat containing inguinal hernias.    The distal large bowel is decompressed.  The terminal ileum is unremarkable. The appendix is not confidently identified, no pericecal inflammation.  The small bowel, aside from the duodenum, is unremarkable.  There are a few scattered prominent mesenteric lymph nodes. There are a few scattered shotty periaortic and paracaval lymph nodes. There is  atherosclerotic calcification of the aorta and its branches.    There is osteopenia. There are degenerative changes of the bilateral sacroiliac joints as well as of the spine.  No significant inguinal lymphadenopathy.                                       US SCROTUM AND TESTICLES WITH DOPPLER (XPD) (Final result)  Result time 04/01/24 15:31:06   Procedure changed from US Scrotum And Testicles     Final result by Archie Tapia MD (04/01/24 15:31:06)                   Impression:      No acute sonographic abnormality.    Left-sided varicocele.    Bilateral epididymal head cysts.      Electronically signed by: Archie Tapia MD  Date:    04/01/2024  Time:    15:31               Narrative:    EXAMINATION:  US SCROTUM AND TESTICLES WITH DOPPLER (XPD)    CLINICAL HISTORY:  rt testicular pain; Right testicular pain    TECHNIQUE:  Sonography of the scrotum and testes.    COMPARISON:  None.    FINDINGS:  Right Testicle:    *Size: 4.0 x 1.9 x 2.7 cm  *Appearance: Normal.  *Flow: Normal arterial and venous flow  *Epididymis: Epididymal head cyst measuring 0.3 x 0.2 x 0.3 cm.  *Hydrocele: None.  *Varicocele: None.  .    Left Testicle:    *Size: 3.5 x 1.6 x 2.4 cm  *Appearance: Normal.  *Flow: Normal arterial and venous flow  *Epididymis: Hyperechoic focus in the left epididymal head measuring 0.2 x 0.2 x 0.2 cm, likely a complicated epididymal head cyst.  *Hydrocele: None.  *Varicocele: Present.  .    Other findings: None.                                       Medications   ketorolac injection 15 mg (15 mg Intravenous Given 4/1/24 1453)   sodium chloride 0.9% bolus 1,000 mL 1,000 mL (0 mLs Intravenous Paused 4/1/24 1838)   ondansetron injection 4 mg (4 mg Intramuscular Given 4/1/24 1710)   diazePAM injection 5 mg (5 mg Intravenous Given 4/1/24 1709)   iohexoL (OMNIPAQUE 350) injection 100 mL (100 mLs Intravenous Given 4/1/24 1847)   metoprolol succinate (TOPROL-XL) 24 hr tablet 25 mg (25 mg Oral Given 4/1/24 1943)    metoprolol injection 5 mg (5 mg Intravenous Given 4/1/24 2030)     Medical Decision Making  Amount and/or Complexity of Data Reviewed  External Data Reviewed: notes.     Details: Note from urology on 03/28:  This is a 68 y.o. male patient that is new to me. He was seen 3/13/2024 by KOSTAS Quinones NP for R testicular pain. Presents today for follow up. Patient states he has been taking hydrocodone for R testicle pain which improves pain. He would like a refill. He was treated with Bactrim. No personal hx of kidney stones. Dad may have made kidney stones. No urinary complaints.     US of scrotum and testicles 2/22/24: right epididymal head cyst  US of abdomen 2/27/24: no evidence of right inguinal hernia.  Urine culture 2/29/24: no growth for infection  GC and Chlamydia: negative    Assessment   1. Right groin pain   2. Epididymitis, right         Plan:  - Normal physical exam and testicular US. Informed him I would not prescribe narcotic pain medication for the pain he is experiencing. I can place a referral to pain management if interested, declined. Testicle pain precautions and recommendations provided. Recommended pelvic floor rest as well as no heavy lifting for at least 2 weeks.      Radiology: ordered.    Risk  Prescription drug management.      68-year-old male coming in with 2-3 weeks of right-sided testicular pain, has been status post course of antibiotics is Currently taking Norco, seen Urology x2 with no clear etiology of his symptoms.  Is undergoing conservative management.    Given worsening symptoms will repeat workup including repeat ultrasound.  Will obtain screening labs to look for any metabolic hematologic abnormalities, urine studies.    Of note patient did vomit x1 after I examined his abdomen.  He says that this is not uncommon for him and attributed to his metformin usage.  His abdominal exam was benign.  I do not see any signs of hernias.  Will continue to monitor emergency department.    Will  give Toradol for symptom control.    Disposition based on ED workup and patient's pathology.    Update:  CBC is unremarkable, CMP resulted with a sodium of 128, patient has been hyponatremic before down to 127 and 10s to live slightly hyponatremic although this is below his recent baseline, AST ALT elevated in a alcohol consumption pattern, bicarb is 18 with borderline positive anion gap.    Ultrasound of the testicles as noted, no acute abnormality, left varicocele and bilateral epididymal head cysts.  Nothing explaining his testicular pain.    Did not improve after Toradol.    Patient re-evaluated, continues to spit out and started retching in the emergency department.  Given hyponatremia, complain of abdominal pain, active vomiting, testicular pain with no testicular findings to explain symptoms, will undertake further workup with CT abdomen pelvis to look for any dangerous intra-abdominal pathology.    Will control symptoms with Zofran and IV fluids.  Patient is asking something for anxiety, he has a daily drinker question early withdrawal will give 5 of Valium to see if this helps his symptoms.    Signed out to Dr. Rios pending CT abdomen pelvis and re-evaluation, disposition after ED interventions for symptomatic improvement.                                    Clinical Impression:  Final diagnoses:  [N50.811] Right testicular pain  [E87.1] Hyponatremia (Primary)  [R74.8] Elevated liver enzymes  [R00.0] Tachycardia  [R10.9] Abdominal pain, unspecified abdominal location                 Bryan Corbin MD  04/01/24 2037

## 2024-04-01 NOTE — ED NOTES
Patient identifiers verified and correct for  Mr Abadie  C/C: Testicle pain SEE NN  APPEARANCE: awake and alert in NAD. PAIN  10/10  SKIN: warm, dry and intact. No breakdown or bruising.  MUSCULOSKELETAL: Patient moving all extremities spontaneously, no obvious swelling or deformities noted. Ambulates independently.  RESPIRATORY: Denies shortness of breath.Respirations unlabored.   CARDIAC: Denies CP, 2+ distal pulses; no peripheral edema  ABDOMEN: S/ND/NT, Denies nausea  : voids spontaneously, denies difficulty, eports testicle pain   Neurologic: AAO x 4; follows commands equal strength in all extremities; denies numbness/tingling. Denies dizziness Denies new weakness

## 2024-04-01 NOTE — ED NOTES
"Patient states "bump" to right testicle  x 2 weeks, Saw PCP 2 weeks ago, also states he saw Urologist x 2 for same, taking Norco,  Has had US x2 for same   "

## 2024-04-01 NOTE — FIRST PROVIDER EVALUATION
"Medical screening examination initiated.  I have conducted a focused provider triage encounter, findings are as follows:    Brief history of present illness:  1-2 months of R testicle pain. Now worsening.    Vitals:    04/01/24 1244   BP: (!) 152/89   Pulse: 105   Resp: 18   Temp: 97.9 °F (36.6 °C)   TempSrc: Oral   SpO2: 96%   Weight: 81.6 kg (180 lb)   Height: 5' 6" (1.676 m)       Pertinent physical exam:  Alert, no acute distress. Tachycardic.    Brief workup plan:  Labs, UA    Preliminary workup initiated; this workup will be continued and followed by the physician or advanced practice provider that is assigned to the patient when roomed.  "

## 2024-04-02 ENCOUNTER — OFFICE VISIT (OUTPATIENT)
Dept: INTERNAL MEDICINE | Facility: CLINIC | Age: 69
End: 2024-04-02
Payer: MEDICARE

## 2024-04-02 ENCOUNTER — HOSPITAL ENCOUNTER (OUTPATIENT)
Facility: HOSPITAL | Age: 69
Discharge: HOME | End: 2024-04-03
Attending: STUDENT IN AN ORGANIZED HEALTH CARE EDUCATION/TRAINING PROGRAM | Admitting: STUDENT IN AN ORGANIZED HEALTH CARE EDUCATION/TRAINING PROGRAM
Payer: MEDICARE

## 2024-04-02 ENCOUNTER — PATIENT OUTREACH (OUTPATIENT)
Dept: EMERGENCY MEDICINE | Facility: HOSPITAL | Age: 69
End: 2024-04-02
Payer: MEDICARE

## 2024-04-02 VITALS — HEIGHT: 66 IN | WEIGHT: 176.13 LBS | OXYGEN SATURATION: 98 % | HEART RATE: 148 BPM | BODY MASS INDEX: 28.31 KG/M2

## 2024-04-02 DIAGNOSIS — I48.91 ATRIAL FIBRILLATION, UNSPECIFIED TYPE: Primary | ICD-10-CM

## 2024-04-02 DIAGNOSIS — R07.9 CHEST PAIN: ICD-10-CM

## 2024-04-02 DIAGNOSIS — R53.1 WEAKNESS: ICD-10-CM

## 2024-04-02 DIAGNOSIS — I95.9 HYPOTENSION, UNSPECIFIED HYPOTENSION TYPE: ICD-10-CM

## 2024-04-02 DIAGNOSIS — I48.92 ATRIAL FLUTTER: ICD-10-CM

## 2024-04-02 DIAGNOSIS — I48.91 ATRIAL FIBRILLATION: ICD-10-CM

## 2024-04-02 PROBLEM — N17.9 AKI (ACUTE KIDNEY INJURY): Status: ACTIVE | Noted: 2024-04-02

## 2024-04-02 PROBLEM — E87.20 METABOLIC ACIDOSIS: Status: ACTIVE | Noted: 2022-11-29

## 2024-04-02 LAB
ALBUMIN SERPL BCP-MCNC: 4.1 G/DL (ref 3.5–5.2)
ALP SERPL-CCNC: 80 U/L (ref 55–135)
ALT SERPL W/O P-5'-P-CCNC: 82 U/L (ref 10–44)
ANION GAP SERPL CALC-SCNC: 17 MMOL/L (ref 8–16)
AST SERPL-CCNC: 150 U/L (ref 10–40)
BASOPHILS # BLD AUTO: 0.06 K/UL (ref 0–0.2)
BASOPHILS NFR BLD: 0.8 % (ref 0–1.9)
BILIRUB SERPL-MCNC: 0.8 MG/DL (ref 0.1–1)
BNP SERPL-MCNC: 529 PG/ML (ref 0–99)
BUN SERPL-MCNC: 11 MG/DL (ref 8–23)
CALCIUM SERPL-MCNC: 10.1 MG/DL (ref 8.7–10.5)
CHLORIDE SERPL-SCNC: 98 MMOL/L (ref 95–110)
CO2 SERPL-SCNC: 18 MMOL/L (ref 23–29)
CREAT SERPL-MCNC: 1.4 MG/DL (ref 0.5–1.4)
DIFFERENTIAL METHOD BLD: ABNORMAL
EOSINOPHIL # BLD AUTO: 0.1 K/UL (ref 0–0.5)
EOSINOPHIL NFR BLD: 0.8 % (ref 0–8)
ERYTHROCYTE [DISTWIDTH] IN BLOOD BY AUTOMATED COUNT: 16.9 % (ref 11.5–14.5)
EST. GFR  (NO RACE VARIABLE): 54.4 ML/MIN/1.73 M^2
ETHANOL SERPL-MCNC: <10 MG/DL
GLUCOSE SERPL-MCNC: 226 MG/DL (ref 70–110)
HCT VFR BLD AUTO: 47.3 % (ref 40–54)
HGB BLD-MCNC: 15.9 G/DL (ref 14–18)
IMM GRANULOCYTES # BLD AUTO: 0.05 K/UL (ref 0–0.04)
IMM GRANULOCYTES NFR BLD AUTO: 0.7 % (ref 0–0.5)
LIPASE SERPL-CCNC: 30 U/L (ref 4–60)
LYMPHOCYTES # BLD AUTO: 1.4 K/UL (ref 1–4.8)
LYMPHOCYTES NFR BLD: 19 % (ref 18–48)
MAGNESIUM SERPL-MCNC: 1.8 MG/DL (ref 1.6–2.6)
MCH RBC QN AUTO: 30.7 PG (ref 27–31)
MCHC RBC AUTO-ENTMCNC: 33.6 G/DL (ref 32–36)
MCV RBC AUTO: 91 FL (ref 82–98)
MONOCYTES # BLD AUTO: 1 K/UL (ref 0.3–1)
MONOCYTES NFR BLD: 13 % (ref 4–15)
NEUTROPHILS # BLD AUTO: 4.8 K/UL (ref 1.8–7.7)
NEUTROPHILS NFR BLD: 65.7 % (ref 38–73)
NRBC BLD-RTO: 0 /100 WBC
OHS QRS DURATION: 80 MS
OHS QRS DURATION: 82 MS
OHS QTC CALCULATION: 429 MS
OHS QTC CALCULATION: 440 MS
PLATELET # BLD AUTO: 156 K/UL (ref 150–450)
PMV BLD AUTO: 8.8 FL (ref 9.2–12.9)
POCT GLUCOSE: 151 MG/DL (ref 70–110)
POTASSIUM SERPL-SCNC: 4.9 MMOL/L (ref 3.5–5.1)
PROT SERPL-MCNC: 7.7 G/DL (ref 6–8.4)
RBC # BLD AUTO: 5.18 M/UL (ref 4.6–6.2)
SODIUM SERPL-SCNC: 133 MMOL/L (ref 136–145)
TROPONIN I SERPL DL<=0.01 NG/ML-MCNC: <0.006 NG/ML (ref 0–0.03)
WBC # BLD AUTO: 7.28 K/UL (ref 3.9–12.7)

## 2024-04-02 PROCEDURE — 83735 ASSAY OF MAGNESIUM: CPT | Performed by: STUDENT IN AN ORGANIZED HEALTH CARE EDUCATION/TRAINING PROGRAM

## 2024-04-02 PROCEDURE — 82077 ASSAY SPEC XCP UR&BREATH IA: CPT | Performed by: STUDENT IN AN ORGANIZED HEALTH CARE EDUCATION/TRAINING PROGRAM

## 2024-04-02 PROCEDURE — 96361 HYDRATE IV INFUSION ADD-ON: CPT

## 2024-04-02 PROCEDURE — 63600175 PHARM REV CODE 636 W HCPCS: Performed by: STUDENT IN AN ORGANIZED HEALTH CARE EDUCATION/TRAINING PROGRAM

## 2024-04-02 PROCEDURE — 83880 ASSAY OF NATRIURETIC PEPTIDE: CPT | Performed by: STUDENT IN AN ORGANIZED HEALTH CARE EDUCATION/TRAINING PROGRAM

## 2024-04-02 PROCEDURE — 25000003 PHARM REV CODE 250: Performed by: HOSPITALIST

## 2024-04-02 PROCEDURE — 25000003 PHARM REV CODE 250: Performed by: PHYSICIAN ASSISTANT

## 2024-04-02 PROCEDURE — 93010 ELECTROCARDIOGRAM REPORT: CPT | Mod: ,,, | Performed by: INTERNAL MEDICINE

## 2024-04-02 PROCEDURE — 99499 UNLISTED E&M SERVICE: CPT | Mod: S$GLB,,, | Performed by: INTERNAL MEDICINE

## 2024-04-02 PROCEDURE — G0378 HOSPITAL OBSERVATION PER HR: HCPCS

## 2024-04-02 PROCEDURE — 85025 COMPLETE CBC W/AUTO DIFF WBC: CPT | Performed by: STUDENT IN AN ORGANIZED HEALTH CARE EDUCATION/TRAINING PROGRAM

## 2024-04-02 PROCEDURE — 83690 ASSAY OF LIPASE: CPT | Performed by: STUDENT IN AN ORGANIZED HEALTH CARE EDUCATION/TRAINING PROGRAM

## 2024-04-02 PROCEDURE — 80053 COMPREHEN METABOLIC PANEL: CPT | Performed by: STUDENT IN AN ORGANIZED HEALTH CARE EDUCATION/TRAINING PROGRAM

## 2024-04-02 PROCEDURE — 25000003 PHARM REV CODE 250: Performed by: STUDENT IN AN ORGANIZED HEALTH CARE EDUCATION/TRAINING PROGRAM

## 2024-04-02 PROCEDURE — 99285 EMERGENCY DEPT VISIT HI MDM: CPT | Mod: 25

## 2024-04-02 PROCEDURE — 93005 ELECTROCARDIOGRAM TRACING: CPT

## 2024-04-02 PROCEDURE — 51798 US URINE CAPACITY MEASURE: CPT

## 2024-04-02 PROCEDURE — 84484 ASSAY OF TROPONIN QUANT: CPT | Performed by: STUDENT IN AN ORGANIZED HEALTH CARE EDUCATION/TRAINING PROGRAM

## 2024-04-02 PROCEDURE — A4216 STERILE WATER/SALINE, 10 ML: HCPCS | Performed by: PHYSICIAN ASSISTANT

## 2024-04-02 PROCEDURE — 96375 TX/PRO/DX INJ NEW DRUG ADDON: CPT

## 2024-04-02 PROCEDURE — 96368 THER/DIAG CONCURRENT INF: CPT

## 2024-04-02 PROCEDURE — 99999 PR PBB SHADOW E&M-EST. PATIENT-LVL III: CPT | Mod: PBBFAC,,, | Performed by: INTERNAL MEDICINE

## 2024-04-02 PROCEDURE — 96365 THER/PROPH/DIAG IV INF INIT: CPT

## 2024-04-02 PROCEDURE — 82962 GLUCOSE BLOOD TEST: CPT

## 2024-04-02 PROCEDURE — 63600175 PHARM REV CODE 636 W HCPCS: Performed by: HOSPITALIST

## 2024-04-02 RX ORDER — IPRATROPIUM BROMIDE AND ALBUTEROL SULFATE 2.5; .5 MG/3ML; MG/3ML
3 SOLUTION RESPIRATORY (INHALATION) EVERY 6 HOURS PRN
Status: DISCONTINUED | OUTPATIENT
Start: 2024-04-02 | End: 2024-04-03 | Stop reason: HOSPADM

## 2024-04-02 RX ORDER — ATORVASTATIN CALCIUM 40 MG/1
80 TABLET, FILM COATED ORAL DAILY
Status: DISCONTINUED | OUTPATIENT
Start: 2024-04-03 | End: 2024-04-03 | Stop reason: HOSPADM

## 2024-04-02 RX ORDER — ALUMINUM HYDROXIDE, MAGNESIUM HYDROXIDE, AND SIMETHICONE 1200; 120; 1200 MG/30ML; MG/30ML; MG/30ML
30 SUSPENSION ORAL 4 TIMES DAILY PRN
Status: DISCONTINUED | OUTPATIENT
Start: 2024-04-02 | End: 2024-04-03 | Stop reason: HOSPADM

## 2024-04-02 RX ORDER — ONDANSETRON 8 MG/1
8 TABLET, ORALLY DISINTEGRATING ORAL EVERY 8 HOURS PRN
Status: DISCONTINUED | OUTPATIENT
Start: 2024-04-02 | End: 2024-04-03 | Stop reason: HOSPADM

## 2024-04-02 RX ORDER — DIAZEPAM 5 MG/1
10 TABLET ORAL EVERY 6 HOURS
Status: DISCONTINUED | OUTPATIENT
Start: 2024-04-02 | End: 2024-04-03

## 2024-04-02 RX ORDER — PANTOPRAZOLE SODIUM 40 MG/1
40 TABLET, DELAYED RELEASE ORAL DAILY
Status: DISCONTINUED | OUTPATIENT
Start: 2024-04-03 | End: 2024-04-03 | Stop reason: HOSPADM

## 2024-04-02 RX ORDER — METOPROLOL TARTRATE 1 MG/ML
5 INJECTION, SOLUTION INTRAVENOUS
Status: COMPLETED | OUTPATIENT
Start: 2024-04-02 | End: 2024-04-02

## 2024-04-02 RX ORDER — THIAMINE HCL 100 MG
100 TABLET ORAL DAILY
Status: DISCONTINUED | OUTPATIENT
Start: 2024-04-03 | End: 2024-04-03 | Stop reason: HOSPADM

## 2024-04-02 RX ORDER — SODIUM CHLORIDE 0.9 % (FLUSH) 0.9 %
10 SYRINGE (ML) INJECTION EVERY 8 HOURS
Status: DISCONTINUED | OUTPATIENT
Start: 2024-04-02 | End: 2024-04-03 | Stop reason: HOSPADM

## 2024-04-02 RX ORDER — ACETAMINOPHEN 325 MG/1
650 TABLET ORAL EVERY 8 HOURS PRN
Status: DISCONTINUED | OUTPATIENT
Start: 2024-04-02 | End: 2024-04-03 | Stop reason: HOSPADM

## 2024-04-02 RX ORDER — HYDROCODONE BITARTRATE AND ACETAMINOPHEN 5; 325 MG/1; MG/1
1 TABLET ORAL EVERY 12 HOURS PRN
Status: DISCONTINUED | OUTPATIENT
Start: 2024-04-02 | End: 2024-04-03 | Stop reason: HOSPADM

## 2024-04-02 RX ORDER — FOLIC ACID 1 MG/1
1 TABLET ORAL DAILY
Status: DISCONTINUED | OUTPATIENT
Start: 2024-04-03 | End: 2024-04-03 | Stop reason: HOSPADM

## 2024-04-02 RX ORDER — GLUCAGON 1 MG
1 KIT INJECTION
Status: DISCONTINUED | OUTPATIENT
Start: 2024-04-02 | End: 2024-04-03 | Stop reason: HOSPADM

## 2024-04-02 RX ORDER — NALOXONE HCL 0.4 MG/ML
0.02 VIAL (ML) INJECTION
Status: DISCONTINUED | OUTPATIENT
Start: 2024-04-02 | End: 2024-04-03 | Stop reason: HOSPADM

## 2024-04-02 RX ORDER — ALLOPURINOL 100 MG/1
200 TABLET ORAL DAILY
Status: DISCONTINUED | OUTPATIENT
Start: 2024-04-03 | End: 2024-04-03 | Stop reason: HOSPADM

## 2024-04-02 RX ORDER — POLYETHYLENE GLYCOL 3350 17 G/17G
17 POWDER, FOR SOLUTION ORAL DAILY PRN
Status: DISCONTINUED | OUTPATIENT
Start: 2024-04-02 | End: 2024-04-03 | Stop reason: HOSPADM

## 2024-04-02 RX ORDER — METOPROLOL SUCCINATE 25 MG/1
25 TABLET, EXTENDED RELEASE ORAL DAILY
Status: DISCONTINUED | OUTPATIENT
Start: 2024-04-03 | End: 2024-04-03 | Stop reason: HOSPADM

## 2024-04-02 RX ORDER — ROPINIROLE 1 MG/1
1 TABLET, FILM COATED ORAL EVERY EVENING
Status: DISCONTINUED | OUTPATIENT
Start: 2024-04-02 | End: 2024-04-03 | Stop reason: HOSPADM

## 2024-04-02 RX ORDER — IBUPROFEN 200 MG
24 TABLET ORAL
Status: DISCONTINUED | OUTPATIENT
Start: 2024-04-02 | End: 2024-04-03 | Stop reason: HOSPADM

## 2024-04-02 RX ORDER — CYANOCOBALAMIN (VITAMIN B-12) 250 MCG
1000 TABLET ORAL DAILY
Status: DISCONTINUED | OUTPATIENT
Start: 2024-04-03 | End: 2024-04-03 | Stop reason: HOSPADM

## 2024-04-02 RX ORDER — INSULIN ASPART 100 [IU]/ML
0-5 INJECTION, SOLUTION INTRAVENOUS; SUBCUTANEOUS
Status: DISCONTINUED | OUTPATIENT
Start: 2024-04-02 | End: 2024-04-03 | Stop reason: HOSPADM

## 2024-04-02 RX ORDER — SIMETHICONE 80 MG
1 TABLET,CHEWABLE ORAL 4 TIMES DAILY PRN
Status: DISCONTINUED | OUTPATIENT
Start: 2024-04-02 | End: 2024-04-03 | Stop reason: HOSPADM

## 2024-04-02 RX ORDER — PROCHLORPERAZINE EDISYLATE 5 MG/ML
5 INJECTION INTRAMUSCULAR; INTRAVENOUS EVERY 6 HOURS PRN
Status: DISCONTINUED | OUTPATIENT
Start: 2024-04-02 | End: 2024-04-03 | Stop reason: HOSPADM

## 2024-04-02 RX ORDER — LORAZEPAM 2 MG/ML
2 INJECTION INTRAMUSCULAR
Status: COMPLETED | OUTPATIENT
Start: 2024-04-02 | End: 2024-04-02

## 2024-04-02 RX ORDER — METOPROLOL SUCCINATE 25 MG/1
25 TABLET, EXTENDED RELEASE ORAL DAILY
Status: DISCONTINUED | OUTPATIENT
Start: 2024-04-02 | End: 2024-04-02

## 2024-04-02 RX ORDER — IBUPROFEN 200 MG
16 TABLET ORAL
Status: DISCONTINUED | OUTPATIENT
Start: 2024-04-02 | End: 2024-04-03 | Stop reason: HOSPADM

## 2024-04-02 RX ORDER — SERTRALINE HYDROCHLORIDE 50 MG/1
50 TABLET, FILM COATED ORAL DAILY
Status: DISCONTINUED | OUTPATIENT
Start: 2024-04-03 | End: 2024-04-03 | Stop reason: HOSPADM

## 2024-04-02 RX ORDER — TAMSULOSIN HYDROCHLORIDE 0.4 MG/1
0.4 CAPSULE ORAL NIGHTLY
Status: DISCONTINUED | OUTPATIENT
Start: 2024-04-02 | End: 2024-04-03 | Stop reason: HOSPADM

## 2024-04-02 RX ORDER — DIAZEPAM 10 MG/2ML
10 INJECTION INTRAMUSCULAR EVERY 4 HOURS PRN
Status: DISCONTINUED | OUTPATIENT
Start: 2024-04-02 | End: 2024-04-03 | Stop reason: HOSPADM

## 2024-04-02 RX ORDER — METOPROLOL TARTRATE 1 MG/ML
5 INJECTION, SOLUTION INTRAVENOUS EVERY 5 MIN PRN
Status: DISCONTINUED | OUTPATIENT
Start: 2024-04-02 | End: 2024-04-03 | Stop reason: HOSPADM

## 2024-04-02 RX ORDER — TALC
6 POWDER (GRAM) TOPICAL NIGHTLY PRN
Status: DISCONTINUED | OUTPATIENT
Start: 2024-04-02 | End: 2024-04-03 | Stop reason: HOSPADM

## 2024-04-02 RX ADMIN — DIAZEPAM 10 MG: 5 TABLET ORAL at 05:04

## 2024-04-02 RX ADMIN — LORAZEPAM 2 MG: 2 INJECTION INTRAMUSCULAR; INTRAVENOUS at 12:04

## 2024-04-02 RX ADMIN — SODIUM CHLORIDE 250 ML: 9 INJECTION, SOLUTION INTRAVENOUS at 11:04

## 2024-04-02 RX ADMIN — DRONEDARONE 400 MG: 400 TABLET, FILM COATED ORAL at 05:04

## 2024-04-02 RX ADMIN — FOLIC ACID 1 MG: 5 INJECTION, SOLUTION INTRAMUSCULAR; INTRAVENOUS; SUBCUTANEOUS at 01:04

## 2024-04-02 RX ADMIN — METOPROLOL SUCCINATE 25 MG: 25 TABLET, EXTENDED RELEASE ORAL at 01:04

## 2024-04-02 RX ADMIN — Medication 6 MG: at 09:04

## 2024-04-02 RX ADMIN — ROPINIROLE HYDROCHLORIDE 1 MG: 1 TABLET, FILM COATED ORAL at 08:04

## 2024-04-02 RX ADMIN — Medication 10 ML: at 03:04

## 2024-04-02 RX ADMIN — METOROPROLOL TARTRATE 5 MG: 5 INJECTION, SOLUTION INTRAVENOUS at 12:04

## 2024-04-02 RX ADMIN — THIAMINE HYDROCHLORIDE 100 MG: 100 INJECTION, SOLUTION INTRAMUSCULAR; INTRAVENOUS at 01:04

## 2024-04-02 RX ADMIN — TAMSULOSIN HYDROCHLORIDE 0.4 MG: 0.4 CAPSULE ORAL at 08:04

## 2024-04-02 RX ADMIN — APIXABAN 5 MG: 5 TABLET, FILM COATED ORAL at 08:04

## 2024-04-02 RX ADMIN — Medication 10 ML: at 10:04

## 2024-04-02 RX ADMIN — SODIUM CHLORIDE 500 ML: 9 INJECTION, SOLUTION INTRAVENOUS at 12:04

## 2024-04-02 RX ADMIN — DIAZEPAM 10 MG: 10 INJECTION, SOLUTION INTRAMUSCULAR; INTRAVENOUS at 10:04

## 2024-04-02 NOTE — HPI
Mr. Donald Warren Abadie is a 68-year-old male past medical history alcohol abuse, AFib, gout, diabetes who presents w/ c/o palpitations. Today he was being followed in clinic outpatient, and suddenly felt weak. He was found to be diaphoretic, complaining of palpitations. Family brought him in for assessment given he was just seen in ED yesterday for hyponatremia and Afib RVR. Pt denies associated chest pain, but did have some SOB associated. He is a daily drinker, up to 30 beers daily. Last drink was yesterday w/ only 1 beer. He did not take his metoprolol today d/t confusion on whether to hold his metformin or metoprolol following IV contrast received yesterday. He denies any fevers, chills, chest pain, dyspnea, nausea, vomiting, abdominal pain, syncope, or any trauma.    In ED, initially hypotensive, HR 150s. No leukocytosis or anemia. Na 133. . , ALT 82. , trop 0.006. CXR: No significant intrathoracic abnormality. ECG w/ NSR, HR 72. No ST elevations. BP improved s/p 500 ml NS bolus. HR improved w/ IVF and IV mtp 5 mg x1. Given thiamine, IV ativan and folic acid.

## 2024-04-02 NOTE — ASSESSMENT & PLAN NOTE
Palpitations  Patient with Paroxysmal (<7 days) atrial fibrillation which is controlled currently with Beta Blocker. Patient is currently in sinus rhythm.[id number] Score: 2. Anticoagulation indicated. Anticoagulation done with eliquis .    - AFVSS on RA  - No significant electrolyte abnormalities  - CXR w/ no significant intrathoracic abnormality   - ECG in NSR, HR 72  - continue eliquis 5 mg bid  - continue mtp succinate 25 mg qd  - mtp 5 mg IV prn for Afib rvr  - echo ordered  - tele  - K>4, Mg>2    Results for orders placed during the hospital encounter of 11/29/22    Echo    Interpretation Summary  · The left ventricle is normal in size with mildly decreased systolic function. The estimated ejection fraction is 45-50%.  · There is mild left ventricular global hypokinesis.  · Normal right ventricular size with normal right ventricular systolic function.  · Grade I left ventricular diastolic dysfunction.  · The estimated PA systolic pressure is 33 mmHg.  · Normal central venous pressure (3 mmHg).

## 2024-04-02 NOTE — PLAN OF CARE
[facility] - Emergency Dept  Initial Discharge Assessment       Primary Care Provider: Bacilio Larry MD    Admission Diagnosis: Atrial flutter [I48.92]    Admission Date: 4/2/2024  Expected Discharge Date:     Transition of Care Barriers: (P) None    Payor: [employer] MANAGED MEDICARE / Plan: [employer] MEDICARE HMO / Product Type: Capitation /     Extended Emergency Contact Information  Primary Emergency Contact: Chani Gleason   [facility]  Home Phone: [phone]  Relation: Daughter    Discharge Plan A: (P) Home         [id number] - [place] - [address]  AT [facility] [address]  [address] [place] [address]  Phone: [phone] Fax: [phone]      Initial Assessment (most recent)       Adult Discharge Assessment - 04/02/24 1558          Discharge Assessment    Assessment Type Discharge Planning Assessment (P)      Confirmed/corrected address, phone number and insurance Yes (P)      Confirmed Demographics Correct on Facesheet (P)      Source of Information patient;family;health record (P)      Communicated DALY with patient/caregiver Yes (P)      People in Home alone (P)      Do you expect to return to your current living situation? Yes (P)      Do you have help at home or someone to help you manage your care at home? No (P)      Prior to hospitilization cognitive status: Alert/Oriented (P)      Current cognitive status: Alert/Oriented (P)      Equipment Currently Used at Home none (P)      Readmission within 30 days? Yes (P)      Patient currently being followed by outpatient case management? No (P)      Do you currently have service(s) that help you manage your care at home? No (P)      Do you take prescription medications? Yes (P)      Do you have prescription coverage? Yes (P)      Do you have any problems affording any of your prescribed medications? No (P)      Is the patient taking medications as prescribed? yes (P)      How do you get to doctors  appointments? family or friend will provide;car, drives self (P)      Discharge Plan A Home (P)      DME Needed Upon Discharge  none (P)      Discharge Plan discussed with: Patient (P)      Transition of Care Barriers None (P)         Physical Activity    On average, how many days per week do you engage in moderate to strenuous exercise (like a brisk walk)? 0 days (P)      On average, how many minutes do you engage in exercise at this level? 0 min (P)         Financial Resource Strain    How hard is it for you to pay for the very basics like food, housing, medical care, and heating? Not hard at all (P)         Housing Stability    In the last 12 months, was there a time when you were not able to pay the mortgage or rent on time? No (P)      In the last 12 months, was there a time when you did not have a steady place to sleep or slept in a shelter (including now)? No (P)         Transportation Needs    In the past 12 months, has lack of transportation kept you from medical appointments or from getting medications? No (P)      In the past 12 months, has lack of transportation kept you from meetings, work, or from getting things needed for daily living? No (P)         Food Insecurity    Within the past 12 months, you worried that your food would run out before you got the money to buy more. Never true (P)         Stress    Do you feel stress - tense, restless, nervous, or anxious, or unable to sleep at night because your mind is troubled all the time - these days? Only a little (P)         Social Connections    In a typical week, how many times do you talk on the phone with family, friends, or neighbors? Twice a week (P)      How often do you get together with friends or relatives? Twice a week (P)      How often do you attend Jewish or Yazidism services? Never (P)      Do you belong to any clubs or organizations such as Jewish groups, unions, fraternal or athletic groups, or school groups? No (P)      How often do  you attend meetings of the clubs or organizations you belong to? Never (P)      Are you , , , , never , or living with a partner?  (P)         Alcohol Use    Q1: How often do you have a drink containing alcohol? 2-3 times a week (P)      Q2: How many drinks containing alcohol do you have on a typical day when you are drinking? 3 or 4 (P)      Q3: How often do you have six or more drinks on one occasion? Monthly (P)

## 2024-04-02 NOTE — ASSESSMENT & PLAN NOTE
"Patient's FSGs are controlled on current medication regimen.  Last A1c reviewed-   Lab Results   Component Value Date    HGBA1C 5.9 (H) 02/29/2024     Most recent fingerstick glucose reviewed- No results for input(s): "POCTGLUCOSE" in the last 24 hours.  Current correctional scale  Low  Maintain anti-hyperglycemic dose as follows-   Antihyperglycemics (From admission, onward)      Start     Stop Route Frequency Ordered    04/02/24 1542  insulin aspart U-100 pen 0-5 Units         -- SubQ Before meals & nightly PRN 04/02/24 1442          Hold Oral hypoglycemics while patient is in the hospital.  "

## 2024-04-02 NOTE — ASSESSMENT & PLAN NOTE
Patient has hyponatremia which is controlled,We will aim to correct the sodium by 4-6mEq in 24 hours. We will monitor sodium Daily. The hyponatremia is due to Dehydration/hypovolemia and Heart Failure. We will obtain the following studies: Urine sodium, urine osmolality, serum osmolality. We will treat the hyponatremia with IV fluids as follows: s/p 500 ml bolus x1. The patient's sodium results have been reviewed and are listed below.  Recent Labs   Lab 04/02/24  1239   *

## 2024-04-02 NOTE — ASSESSMENT & PLAN NOTE
- no current s/s of alcohol withdrawls  - continue folic acid and thiamine  - ciwa q4h  - neuro checks

## 2024-04-02 NOTE — ED PROVIDER NOTES
"Encounter Date: 4/2/2024       History     Chief Complaint   Patient presents with    Atrial Fibrillation     HPI    68-year-old male past medical history alcohol abuse, AFib, gout, diabetes who presents to the ER for evaluation of palpitations.  Patient was seen here in the ED yesterday and had hyponatremia with AFib RVR.  Improved with treatment. Today he was being followed in clinic outpatient, and felt weak.  He was found to be diaphoretic, complaining of palpitations.  Family brought him in for assessment.  He is a daily drinker, up to 30 beers daily.  Last drink was yesterday.  He did not take his metoprolol today.  He denies any fevers, chills, chest pain, dyspnea, nausea, vomiting, abdominal pain, syncope, or any trauma.      Review of patient's allergies indicates:   Allergen Reactions    No known drug allergies      Past Medical History:   Diagnosis Date    A-fib     Alcohol abuse     Anxiety     Arthritis     Chronic gout     Diabetes mellitus     DM (diabetes mellitus) 11/16/2016    "boarderline, not taking any meds currently"    Fatty liver     Hyperlipidemia     Renal cell cancer 2014    S/P TKR (total knee replacement), right 06/27/2019     Past Surgical History:   Procedure Laterality Date    ABSCESS DRAINAGE      perirectal    COLONOSCOPY      HAND SURGERY Left     JOINT REPLACEMENT      knee replacement right knee    KIDNEY SURGERY      partial left kidney removal - [place]    PARTIAL NEPHRECTOMY Left August 2014    PERCUTANEOUS CRYOTHERAPY OF PERIPHERAL NERVE USING LIQUID NITROUS OXIDE IN CLOSED NEEDLE DEVICE Right 6/17/2019    Procedure: CRYOTHERAPY, NERVE, PERIPHERAL, PERCUTANEOUS, USING LIQUID NITROUS OXIDE IN CLOSED NEEDLE DEVICE-right knee iovera;  Surgeon: Donny Hair III, MD;  Location: [facility] CATH LAB;  Service: Pain Management;  Laterality: Right;    TOTAL KNEE ARTHROPLASTY Right 6/27/2019    Procedure: ARTHROPLASTY, KNEE, TOTAL-SAME DAY;  Surgeon: Mikael Huerta MD;  Location: " [facility] OR 2ND FLR;  Service: Orthopedics;  Laterality: Right;     Family History   Problem Relation Age of Onset    Lung cancer Father     Colon cancer Father     Cancer Father         lung cancer    Diabetes Mother     Alzheimer's disease Mother     Lung cancer Sister     Cancer Sister         lung    Colon polyps Brother     Alcohol abuse Brother     Alzheimer's disease Brother     Cirrhosis Neg Hx      Social History     Tobacco Use    Smoking status: Never    Smokeless tobacco: Never   Substance Use Topics    Alcohol use: Yes     Alcohol/week: 168.0 standard drinks of alcohol     Types: 168 Cans of beer per week     Comment: 12-24  beers daily    Drug use: No     Review of Systems   Constitutional:  Positive for fatigue. Negative for fever.   Respiratory:  Negative for shortness of breath.    Cardiovascular:  Positive for palpitations.   Gastrointestinal:  Negative for nausea.   Genitourinary:  Negative for dysuria.   Musculoskeletal:  Negative for back pain.   Neurological:  Negative for weakness.       Physical Exam     Initial Vitals [04/02/24 1201]   BP Pulse Resp Temp SpO2   99/63 (!) 150 (!) 97 97.9 °F (36.6 °C) 97 %      MAP       --         Physical Exam    Nursing note and vitals reviewed.  Constitutional: He appears well-nourished.   HENT:   Head: Atraumatic.   Eyes: EOM are normal.   Neck: Neck supple.   Cardiovascular:            Tachycardic, regular, atrial flutter   Pulmonary/Chest: Breath sounds normal. No respiratory distress.   Abdominal: Abdomen is soft. There is no abdominal tenderness.   Musculoskeletal:         General: No edema. Normal range of motion.      Cervical back: Neck supple.     Neurological: He is alert and oriented to person, place, and time.   Skin: Skin is warm and dry.   Psychiatric: He has a normal mood and affect. Thought content normal.         ED Course   Procedures  Labs Reviewed   CBC W/ AUTO DIFFERENTIAL - Abnormal; Notable for the following components:       Result  Value    RDW 16.9 (*)     MPV 8.8 (*)     Immature Granulocytes 0.7 (*)     Immature Grans (Abs) 0.05 (*)     All other components within normal limits   COMPREHENSIVE METABOLIC PANEL - Abnormal; Notable for the following components:    Sodium 133 (*)     CO2 18 (*)     Glucose 226 (*)      (*)     ALT 82 (*)     eGFR 54.4 (*)     Anion Gap 17 (*)     All other components within normal limits   B-TYPE NATRIURETIC PEPTIDE - Abnormal; Notable for the following components:     (*)     All other components within normal limits   ALCOHOL,MEDICAL (ETHANOL)   MAGNESIUM   TROPONIN I   LIPASE   URINALYSIS, REFLEX TO URINE CULTURE   DRUG SCREEN PANEL, URINE EMERGENCY          Imaging Results              X-Ray Chest AP Portable (Final result)  Result time 04/02/24 13:41:35      Final result by Mode Jaime MD (04/02/24 13:41:35)                   Impression:      No significant intrathoracic abnormality.  No significant detrimental interval change in the appearance of the chest since 10/28/2023 is appreciated, allowing for a considerably poorer inspiratory depth level on the current exam.      Electronically signed by: Mode Jaime MD  Date:    04/02/2024  Time:    13:41               Narrative:    EXAMINATION:  XR CHEST AP PORTABLE    CLINICAL HISTORY:  palpitations;    TECHNIQUE:  One view    COMPARISON:  Comparison is made to 10/28/2023.  Clinical information of tachycardia/palpitations.    FINDINGS:  Allowing for magnification of the cardiomediastinal silhouette related both to projection and to a relatively poor inspiratory depth level, the heart is not significantly enlarged and the appearance of the cardiomediastinal silhouette demonstrates no detrimental change since the examination referenced above.  Pulmonary vascularity is normal, and there are no findings indicating current cardiac decompensation.  Lung zones are clear, and are free of significant airspace consolidation or volume loss.  No pleural  fluid.  No pneumothorax.                                       Medications   metoprolol succinate (TOPROL-XL) 24 hr tablet 25 mg (25 mg Oral Given 4/2/24 1313)   allopurinoL tablet 200 mg (has no administration in time range)   cyanocobalamin tablet 1,000 mcg (has no administration in time range)   dronedarone tablet 400 mg (has no administration in time range)   apixaban tablet 5 mg (has no administration in time range)   folic acid tablet 1 mg (has no administration in time range)   HYDROcodone-acetaminophen 5-325 mg per tablet 1 tablet (has no administration in time range)   pantoprazole EC tablet 40 mg (has no administration in time range)   rOPINIRole tablet 1 mg (has no administration in time range)   atorvastatin tablet 80 mg (has no administration in time range)   sertraline tablet 50 mg (has no administration in time range)   tamsulosin 24 hr capsule 0.4 mg (has no administration in time range)   thiamine tablet 100 mg (has no administration in time range)   sodium chloride 0.9% flush 10 mL (has no administration in time range)   albuterol-ipratropium 2.5 mg-0.5 mg/3 mL nebulizer solution 3 mL (has no administration in time range)   melatonin tablet 6 mg (has no administration in time range)   ondansetron disintegrating tablet 8 mg (has no administration in time range)   prochlorperazine injection Soln 5 mg (has no administration in time range)   polyethylene glycol packet 17 g (has no administration in time range)   simethicone chewable tablet 80 mg (has no administration in time range)   aluminum-magnesium hydroxide-simethicone 200-200-20 mg/5 mL suspension 30 mL (has no administration in time range)   acetaminophen tablet 650 mg (has no administration in time range)   naloxone 0.4 mg/mL injection 0.02 mg (has no administration in time range)   glucose chewable tablet 16 g (has no administration in time range)   glucose chewable tablet 24 g (has no administration in time range)   glucagon (human  recombinant) injection 1 mg (has no administration in time range)   dextrose 10% bolus 125 mL 125 mL (has no administration in time range)   dextrose 10% bolus 250 mL 250 mL (has no administration in time range)   insulin aspart U-100 pen 0-5 Units (has no administration in time range)   metoprolol injection 5 mg (5 mg Intravenous Given 4/2/24 1231)   LORazepam injection 2 mg (2 mg Intravenous Given 4/2/24 1231)   thiamine (B-1) 100 mg in dextrose 5 % (D5W) 100 mL IVPB (0 mg Intravenous Stopped 4/2/24 1348)   folic acid 1 mg in dextrose 5 % (D5W) 100 mL IVPB (0 mg Intravenous Stopped 4/2/24 1415)   sodium chloride 0.9% bolus 500 mL 500 mL (0 mLs Intravenous Stopped 4/2/24 1348)     Medical Decision Making  Amount and/or Complexity of Data Reviewed  Labs: ordered. Decision-making details documented in ED Course.  Radiology: ordered and independent interpretation performed. Decision-making details documented in ED Course.  ECG/medicine tests: ordered and independent interpretation performed. Decision-making details documented in ED Course.    Risk  Prescription drug management.                     EKG  Rate 151   Atrial flutter, fixed rate at 150   No STEMI             67 y/o M here with palpitations.   on arrival, appears fixed aflutter.  He converted to NSR with 5mg IV metoprolol. Added on PO metop.  Normal WBC, sodium 133, elevated LFTs.  Trop negative.  CXR normal.  Thiamine, folic acid, ativan given for possible early withdrawal.  Admitted to . Pt and family agreeable with plan.    Critical care time of 31 minutes.    Clinical Impression:  Final diagnoses:  [I48.91] Atrial fibrillation  [R53.1] Weakness  [I48.92] Atrial flutter          ED Disposition Condition    Observation                 Emil Woo MD  04/02/24 5304       Emil Woo MD  04/02/24 3162

## 2024-04-02 NOTE — PROVIDER PROGRESS NOTES - EMERGENCY DEPT.
Encounter Date: 4/2/2024    ED Physician Progress Notes         EKG - STEMI Decision  Initial Reading: No STEMI present.  Response: patient moved to monitored bed.

## 2024-04-02 NOTE — ASSESSMENT & PLAN NOTE
Patient with acute kidney injury/acute renal failure likely due to pre-renal azotemia due to IVVD JASS is currently stable. Baseline creatinine 0.9 - Labs reviewed- Renal function/electrolytes with Estimated Creatinine Clearance: 49.9 mL/min (based on SCr of 1.4 mg/dL). according to latest data. Monitor urine output and serial BMP and adjust therapy as needed. Avoid nephrotoxins and renally dose meds for GFR listed above.

## 2024-04-02 NOTE — ED NOTES
"Assumed care for pt after recieving report from MARLEEN Solorzano. Pt resting in bed in NAD, RR e/u. Vital signs stable and within desired limits at this time of assessment. Pt offered bathroom assistance and denies need at this time. Explanation of care/wait provided. Pt verbalizes no needs at this time. Bed in low, locked position with rails up and call bell in reach. Pt's white board updated with today's care team and plan.      Patient identifiers for Donald Warren Abadie 68 y.o. male checked and correct.  Chief Complaint   Patient presents with    Atrial Fibrillation     Past Medical History:   Diagnosis Date    A-fib     Alcohol abuse     Anxiety     Arthritis     Chronic gout     Diabetes mellitus     DM (diabetes mellitus) 11/16/2016    "boarderline, not taking any meds currently"    Fatty liver     Hyperlipidemia     Renal cell cancer 2014    S/P TKR (total knee replacement), right 06/27/2019     Allergies reported:   Review of patient's allergies indicates:   Allergen Reactions    No known drug allergies          LOC: Patient is awake, alert, and aware of environment with an appropriate affect. Patient is oriented x 4 and speaking appropriately.  APPEARANCE: Patient resting comfortably and in no acute distress. Patient is clean and well groomed, patient's clothing is properly fastened.  HEENT: WDL  SKIN: The skin is warm and dry. Patient has normal skin turgor and moist mucus membranes.   MUSCULOSKELETAL: Patient is moving all extremities well, no obvious deformities noted. Pulses intact.   RESPIRATORY: Airway is open and patent. Respirations are spontaneous and non-labored with normal effort and rate.  CARDIAC: Patient has a normal rate and rhythm 65 on cardiac monitor. No peripheral edema noted. Denies chest pain and SOB at at time of assessment.   ABDOMEN: No distention noted. Soft and non-tender upon palpation.  NEUROLOGICAL: pupils 3 mm, PERRL. Facial expression is symmetrical. Hand grasps are equal " bilaterally. Normal sensation in all extremities when touched with finger.

## 2024-04-02 NOTE — NURSING
Nurses Note -- 4 Eyes      4/2/2024   6:39 PM      Skin assessed during: Admit      [x] No Altered Skin Integrity Present    [x]Prevention Measures Documented      [] Yes- Altered Skin Integrity Present or Discovered   [] LDA Added if Not in Epic (Describe Wound)   [] New Altered Skin Integrity was Present on Admit and Documented in LDA   [] Wound Image Taken    Wound Care Consulted? No    Attending Nurse:  Yong ESTEVES RN    Second RN/Staff Member:   Nellie BERMAN RN

## 2024-04-02 NOTE — CARE UPDATE
I have reviewed the chart of Donald Warren Abadie who is hospitalized for the following:    Active Hospital Problems    Diagnosis    *Atrial fibrillation with RVR    JASS (acute kidney injury)    Metabolic acidosis    Hyponatremia     POA Na 133  Daily BMP          Ned Garcia PA-C  [facility]

## 2024-04-02 NOTE — ED NOTES
"Pt's radial pulse irregular, pulse bouncing between 80's - 120's on dinamap. Pt reports hx of afib, but states he "I'm not in afib currently." Denies palpitations, fluttering chest, weakness, dizziness at this time. MD Blanca notified.  "

## 2024-04-02 NOTE — ASSESSMENT & PLAN NOTE
Seen by primary care x2, urology x2, treat with course of Bactrim which did not help, outpatient ultrasound of the testicles, ultrasound of the abdomen as noted below, urine studies have been negative, GC chlamydia have been negative.  Only finding was subcentimeter right epididymal cyst. He has been taking hydrocodone / acetaminophen    - continue prn percocet

## 2024-04-02 NOTE — ED NOTES
Assumed care. Pt has no complaints at this time. Pt NSR rate of 60's. Daughter at bedside.    LOC: The patient is awake, alert and aware of environment with an appropriate affect, the patient is oriented x 3 and speaking appropriately.  APPEARANCE: Patient resting comfortably and in no acute distress, patient is clean and well groomed, patient's clothing is properly fastened.  SKIN: The skin is warm and dry, color consistent with ethnicity, patient has normal skin turgor and moist mucus membranes, skin intact, no breakdown or bruising noted.  MUSCULOSKELETAL: Patient moving all extremities spontaneously, no obvious swelling or deformities noted.  RESPIRATORY: Airway is open and patent, respirations are spontaneous, patient has a normal effort and rate, no accessory muscle use noted.  CARDIAC: Patient has a normal rate and regular rhythm, no periphreal edema noted, capillary refill < 3 seconds.  ABDOMEN: Soft and non tender to palpation, no distention noted, normoactive bowel sounds present in all four quadrants.  NEUROLOGIC:  facial expression is symmetrical, patient moving all extremities spontaneously, normal sensation in all extremities when touched with a finger.  Follows all commands appropriately.

## 2024-04-02 NOTE — PROVIDER PROGRESS NOTES - EMERGENCY DEPT.
Encounter Date: 4/1/2024    ED Physician Progress Notes        I assumed care of patient at sign out pending: ct, fluids.    Pending CT abd/pelvis, fluids, repeat istat. He is 69yo, hx of alcohol use, initially here with R sided testicular pain. Seen by pcp and urology and noted no abnormal findings. Exam here was slightly red but overall not concerning. US without any concerning findings. Screening labs show Na 128 (has been low in the past). UA fine. Then started reporting abdominal pain and was actively vomiting. Diffusely uncomforable on exam. Getting CT abd/pelvis. Needs reassessment     Summary of Results:    Labs Reviewed   CBC W/ AUTO DIFFERENTIAL - Abnormal; Notable for the following components:       Result Value    RDW 16.1 (*)     MPV 9.1 (*)     Immature Granulocytes 0.8 (*)     Immature Grans (Abs) 0.06 (*)     All other components within normal limits   COMPREHENSIVE METABOLIC PANEL - Abnormal; Notable for the following components:    Sodium 128 (*)     Chloride 94 (*)     CO2 18 (*)     Glucose 122 (*)     Total Protein 8.5 (*)      (*)     ALT 58 (*)     All other components within normal limits   URINALYSIS, REFLEX TO URINE CULTURE    Narrative:     Specimen Source->Urine   LIPASE     Vitals:    04/01/24 2027 04/01/24 2030 04/01/24 2125 04/01/24 2128   BP: (!) 127/93 (!) 133/93 127/78 129/79   Pulse: (!) 123  81    Resp: 14      Temp:   98.3 °F (36.8 °C)    TempSrc:   Oral    SpO2: 97%  96%    Weight:       Height:        04/01/24 2140   BP:    Pulse:    Resp: 16   Temp:    TempSrc:    SpO2:    Weight:    Height:      CT Abdomen Pelvis With IV Contrast NO Oral Contrast   Final Result   Abnormal      This report was flagged in Epic as abnormal.      1. Wall thickening and inflammation involving the 1st portion of the duodenum concerning for duodenitis.  Correlation is advised.  Direct visualization as warranted.   2. The urinary bladder is distended noting prostatomegaly.  This is likely on  the basis of urinary retention or outlet obstruction however correlation with urinalysis recommended to exclude superimposed infection.  This likely accounts for prominence of the bilateral renal collecting systems.   3. Hepatomegaly, correlation with LFTs recommended.   4. Please see above for several additional findings.         Electronically signed by: Vinny Mcneal MD   Date:    04/01/2024   Time:    19:01      US SCROTUM AND TESTICLES WITH DOPPLER (XPD)   Final Result      No acute sonographic abnormality.      Left-sided varicocele.      Bilateral epididymal head cysts.         Electronically signed by: Archie Tapia MD   Date:    04/01/2024   Time:    15:31        CT does show duodenitis.  Also with prostatomegaly and distended bladder.  Also hepatomegaly.  I spoke with the patient and he was prescribed Protonix but does not take it.  He also was prescribed Flomax but does not take it.  He was able to urinate, has gone several times here today so does not have true urinary retention.  I advise restarting both of these medications.  While waiting, he went into AFib with RVR, rate up to the 140s.  EKG shows irregularly irregular, no obvious P waves, no ST elevation concerning for STEMI.  The patient reports that he has known AFib and goes in and out of it all the time.  He is on metoprolol and dronedarone but infrequently takes them.  He has no chest pain, shortness of breath, palpitations.  He feels totally normal.  We gave the patient IV metoprolol in his rate significantly slowed.  Also given oral metoprolol.  The patient would like to go home.  Initially it was discussed that he could be a candidate for my [facility] admission however they stopped taking patients at a p.m. and unfortunately the patient required more workup and was not ready to disposition at that time.  I do believe that he is safe for discharge home.  Advised to stop drinking alcohol.  We will refer to GI for duodenitis and for  hepatomegaly and LFT elevation.  Otherwise stable for discharge with return precautions.    Disposition:  discharge.

## 2024-04-02 NOTE — H&P
"[facility] - Emergency Dept  [facility]  History & Physical    Patient Name: Donald Warren Abadie  MRN: [id number]  Patient Class: OP- Observation  Admission Date: 4/2/2024  Attending Physician: Jack Samuels MD   Primary Care Provider: Bacilio Larry MD         Patient information was obtained from patient, past medical records, and ER records.     Subjective:     Principal Problem:Atrial fibrillation with RVR    Chief Complaint:   Chief Complaint   Patient presents with    Atrial Fibrillation        HPI: Mr. Donald Warren Abadie is a 68-year-old male past medical history alcohol abuse, AFib, gout, diabetes who presents w/ c/o palpitations. Today he was being followed in clinic outpatient, and suddenly felt weak. He was found to be diaphoretic, complaining of palpitations. Family brought him in for assessment given he was just seen in ED yesterday for hyponatremia and Afib RVR. Pt denies associated chest pain, but did have some SOB associated. He is a daily drinker, up to 30 beers daily. Last drink was yesterday w/ only 1 beer. He did not take his metoprolol today d/t confusion on whether to hold his metformin or metoprolol following IV contrast received yesterday. He denies any fevers, chills, chest pain, dyspnea, nausea, vomiting, abdominal pain, syncope, or any trauma.    In ED, initially hypotensive, HR 150s. No leukocytosis or anemia. Na 133. . , ALT 82. , trop 0.006. CXR: No significant intrathoracic abnormality. ECG w/ NSR, HR 72. No ST elevations. BP improved s/p 500 ml NS bolus. HR improved w/ IVF and IV mtp 5 mg x1. Given thiamine, IV ativan and folic acid.    Past Medical History:   Diagnosis Date    A-fib     Alcohol abuse     Anxiety     Arthritis     Chronic gout     Diabetes mellitus     DM (diabetes mellitus) 11/16/2016    "boarderline, not taking any meds currently"    Fatty liver     Hyperlipidemia     Renal cell cancer 2014    S/P TKR (total knee replacement), right " 06/27/2019       Past Surgical History:   Procedure Laterality Date    ABSCESS DRAINAGE      perirectal    COLONOSCOPY      HAND SURGERY Left     JOINT REPLACEMENT      knee replacement right knee    KIDNEY SURGERY      partial left kidney removal - [place]    PARTIAL NEPHRECTOMY Left August 2014    PERCUTANEOUS CRYOTHERAPY OF PERIPHERAL NERVE USING LIQUID NITROUS OXIDE IN CLOSED NEEDLE DEVICE Right 6/17/2019    Procedure: CRYOTHERAPY, NERVE, PERIPHERAL, PERCUTANEOUS, USING LIQUID NITROUS OXIDE IN CLOSED NEEDLE DEVICE-right knee iovera;  Surgeon: Donny Hair III, MD;  Location: [facility] CATH LAB;  Service: Pain Management;  Laterality: Right;    TOTAL KNEE ARTHROPLASTY Right 6/27/2019    Procedure: ARTHROPLASTY, KNEE, TOTAL-SAME DAY;  Surgeon: Mikael Huerta MD;  Location: [facility] OR [address] [place] [address];  Service: Orthopedics;  Laterality: Right;       Review of patient's allergies indicates:   Allergen Reactions    No known drug allergies        Current Facility-Administered Medications on File Prior to Encounter   Medication    [COMPLETED] diazePAM injection 5 mg    [COMPLETED] iohexoL (OMNIPAQUE 350) injection 100 mL    [COMPLETED] ketorolac injection 15 mg    [COMPLETED] metoprolol injection 5 mg    [COMPLETED] metoprolol injection 5 mg    [COMPLETED] metoprolol succinate (TOPROL-XL) 24 hr tablet 25 mg    [COMPLETED] ondansetron injection 4 mg    [COMPLETED] sodium chloride 0.9% bolus 1,000 mL 1,000 mL     Current Outpatient Medications on File Prior to Encounter   Medication Sig    allopurinoL (ZYLOPRIM) 100 MG tablet TAKE 2 TABLETS(200 MG) BY MOUTH EVERY DAY    ALPRAZolam (XANAX) 0.5 MG tablet Take 1 tablet (0.5 mg total) by mouth nightly as needed for Anxiety.    cyanocobalamin (VITAMIN B-12) 1000 MCG tablet Take 1 tablet (1,000 mcg total) by mouth once daily.    diclofenac sodium (VOLTAREN) 1 % Gel Apply 2 g topically 3 (three) times daily as needed (Right knee - hand pain).    dronedarone (MULTAQ) 400 mg Tab Take 1  tablet (400 mg total) by mouth 2 (two) times daily with meals.    ELIQUIS 5 mg Tab TAKE 1 TABLET BY MOUTH TWICE DAILY (Patient taking differently: Take 5 mg by mouth 2 (two) times daily.)    HYDROcodone-acetaminophen (NORCO) 5-325 mg per tablet Take 1 tablet by mouth every 12 (twelve) hours as needed for Pain.    ketoconazole (NIZORAL) 2 % cream Apply topically once daily. Apply to affected skin from toes to heels twice a day for 3-4 weeks.    magnesium oxide (MAG-OX) 400 mg (241.3 mg magnesium) tablet Take 1 tablet (400 mg total) by mouth once daily.    metFORMIN (GLUCOPHAGE-XR) 500 MG ER 24hr tablet Take 2 tablets (1,000 mg total) by mouth 2 (two) times daily with meals.    metoprolol succinate (TOPROL-XL) 25 MG 24 hr tablet Take 1 tablet (25 mg total) by mouth once daily.    multivitamin (THERAGRAN) tablet Take 1 tablet by mouth once daily.    omega-3 acid ethyl esters (LOVAZA) 1 gram capsule Take 2 g by mouth 2 (two) times daily.    pantoprazole (PROTONIX) 40 MG tablet Take 1 tablet (40 mg total) by mouth once daily.    rOPINIRole (REQUIP) 1 MG tablet TAKE 1 TABLET(1 MG) BY MOUTH EVERY EVENING    rosuvastatin (CRESTOR) 20 MG tablet TAKE 1 TABLET(20 MG) BY MOUTH EVERY DAY    sertraline (ZOLOFT) 50 MG tablet Take 1 tablet (50 mg total) by mouth once daily.    tamsulosin (FLOMAX) 0.4 mg Cap TAKE 1 CAPSULE(0.4 MG) BY MOUTH EVERY DAY (Patient taking differently: Take 0.4 mg by mouth every evening.)    VITAMIN B-1 100 MG tablet TAKE 1 TABLET BY MOUTH ONCE DAILY    folic acid (FOLVITE) 1 MG tablet Take 1 tablet (1 mg total) by mouth once daily.     Family History       Problem Relation (Age of Onset)    Alcohol abuse Brother    Alzheimer's disease Mother, Brother    Cancer Father, Sister    Colon cancer Father    Colon polyps Brother    Diabetes Mother    Lung cancer Father, Sister          Tobacco Use    Smoking status: Never    Smokeless tobacco: Never   Substance and Sexual Activity    Alcohol use: Yes      Alcohol/week: 168.0 standard drinks of alcohol     Types: 168 Cans of beer per week     Comment: 12-24  beers daily    Drug use: No    Sexual activity: Not Currently     Partners: Female     Review of Systems   Constitutional:  Positive for activity change, diaphoresis and fatigue. Negative for chills and fever.   HENT:  Negative for congestion, rhinorrhea and sore throat.    Respiratory:  Positive for shortness of breath. Negative for cough, chest tightness and wheezing.    Cardiovascular:  Positive for palpitations. Negative for chest pain and leg swelling.   Gastrointestinal:  Negative for abdominal distention, abdominal pain, blood in stool, constipation, diarrhea, nausea and vomiting.   Genitourinary:  Negative for difficulty urinating, dysuria and frequency.   Musculoskeletal:  Negative for arthralgias and back pain.   Neurological:  Positive for weakness. Negative for dizziness, tremors, seizures, syncope, light-headedness, numbness and headaches.   Psychiatric/Behavioral:  Negative for agitation, confusion and hallucinations.      Objective:     Vital Signs (Most Recent):  Temp: 98 °F (36.7 °C) (04/02/24 1231)  Pulse: 67 (04/02/24 1347)  Resp: 17 (04/02/24 1347)  BP: (!) 103/59 (04/02/24 1347)  SpO2: 98 % (04/02/24 1347) Vital Signs (24h Range):  Temp:  [97.9 °F (36.6 °C)-98.4 °F (36.9 °C)] 98 °F (36.7 °C)  Pulse:  [] 67  Resp:  [14-97] 17  SpO2:  [96 %-98 %] 98 %  BP: ()/() 103/59     Weight: 81.6 kg (180 lb)  Body mass index is 29.05 kg/m².     Physical Exam  Vitals and nursing note reviewed.   Constitutional:       General: He is not in acute distress.     Appearance: He is well-developed. He is obese. He is not ill-appearing or diaphoretic.   HENT:      Head: Normocephalic and atraumatic.      Right Ear: External ear normal.      Left Ear: External ear normal.      Nose: Nose normal. No congestion.      Mouth/Throat:      Pharynx: Oropharynx is clear.   Eyes:      General: No scleral  icterus.     Extraocular Movements: Extraocular movements intact.   Cardiovascular:      Rate and Rhythm: Normal rate and regular rhythm.      Pulses: Normal pulses.      Heart sounds: Normal heart sounds. No murmur heard.  Pulmonary:      Effort: Pulmonary effort is normal. No respiratory distress.      Breath sounds: Normal breath sounds. No wheezing or rales.   Abdominal:      General: Bowel sounds are normal. There is no distension.      Palpations: Abdomen is soft.      Tenderness: There is no abdominal tenderness. There is no guarding or rebound.   Musculoskeletal:      Cervical back: Normal range of motion.      Right lower leg: No edema.      Left lower leg: No edema.   Skin:     General: Skin is warm and dry.      Capillary Refill: Capillary refill takes less than 2 seconds.   Neurological:      General: No focal deficit present.      Mental Status: He is alert and oriented to person, place, and time. Mental status is at baseline.   Psychiatric:         Mood and Affect: Mood normal.         Behavior: Behavior normal.         Thought Content: Thought content normal.                Significant Labs: All pertinent labs within the past 24 hours have been reviewed.  CBC:   Recent Labs   Lab 04/01/24  1446 04/02/24  1239   WBC 7.65 7.28   HGB 16.2 15.9   HCT 47.6 47.3    156     CMP:   Recent Labs   Lab 04/01/24  1446 04/02/24  1239   * 133*   K 4.6 4.9   CL 94* 98   CO2 18* 18*   * 226*   BUN 8 11   CREATININE 0.9 1.4   CALCIUM 9.5 10.1   PROT 8.5* 7.7   ALBUMIN 4.4 4.1   BILITOT 0.4 0.8   ALKPHOS 91 80   * 150*   ALT 58* 82*   ANIONGAP 16 17*     Cardiac Markers:   Recent Labs   Lab 04/02/24  1239   *     Magnesium:   Recent Labs   Lab 04/02/24  1239   MG 1.8       Significant Imaging: I have reviewed all pertinent imaging results/findings within the past 24 hours.  Imaging Results              X-Ray Chest AP Portable (Final result)  Result time 04/02/24 13:41:35      Final  result by Mode Jaime MD (04/02/24 13:41:35)                   Impression:      No significant intrathoracic abnormality.  No significant detrimental interval change in the appearance of the chest since 10/28/2023 is appreciated, allowing for a considerably poorer inspiratory depth level on the current exam.      Electronically signed by: Mode Jaime MD  Date:    04/02/2024  Time:    13:41               Narrative:    EXAMINATION:  XR CHEST AP PORTABLE    CLINICAL HISTORY:  palpitations;    TECHNIQUE:  One view    COMPARISON:  Comparison is made to 10/28/2023.  Clinical information of tachycardia/palpitations.    FINDINGS:  Allowing for magnification of the cardiomediastinal silhouette related both to projection and to a relatively poor inspiratory depth level, the heart is not significantly enlarged and the appearance of the cardiomediastinal silhouette demonstrates no detrimental change since the examination referenced above.  Pulmonary vascularity is normal, and there are no findings indicating current cardiac decompensation.  Lung zones are clear, and are free of significant airspace consolidation or volume loss.  No pleural fluid.  No pneumothorax.                                     Assessment/Plan:     * Atrial fibrillation with RVR  Palpitations  Patient with Paroxysmal (<7 days) atrial fibrillation which is controlled currently with Beta Blocker. Patient is currently in sinus rhythm.[id number] Score: 2. Anticoagulation indicated. Anticoagulation done with eliquis .    - AFVSS on RA  - No significant electrolyte abnormalities  - CXR w/ no significant intrathoracic abnormality   - ECG in NSR, HR 72  - continue eliquis 5 mg bid  - continue mtp succinate 25 mg qd  - mtp 5 mg IV prn for Afib rvr  - echo ordered  - tele  - K>4, Mg>2    Results for orders placed during the hospital encounter of 11/29/22    Echo    Interpretation Summary  · The left ventricle is normal in size with mildly decreased systolic  function. The estimated ejection fraction is 45-50%.  · There is mild left ventricular global hypokinesis.  · Normal right ventricular size with normal right ventricular systolic function.  · Grade I left ventricular diastolic dysfunction.  · The estimated PA systolic pressure is 33 mmHg.  · Normal central venous pressure (3 mmHg).     Alcohol use disorder, moderate, dependence  - no current s/s of alcohol withdrawls  - continue folic acid and thiamine  - ciwa q4h  - neuro checks      JASS (acute kidney injury)  Patient with acute kidney injury/acute renal failure likely due to pre-renal azotemia due to IVVD JASS is currently stable. Baseline creatinine 0.9 - Labs reviewed- Renal function/electrolytes with Estimated Creatinine Clearance: 49.9 mL/min (based on SCr of 1.4 mg/dL). according to latest data. Monitor urine output and serial BMP and adjust therapy as needed. Avoid nephrotoxins and renally dose meds for GFR listed above.    Benign prostatic hyperplasia with nocturia  - continue flomax    Testicular pain, right  Seen by primary care x2, urology x2, treat with course of Bactrim which did not help, outpatient ultrasound of the testicles, ultrasound of the abdomen as noted below, urine studies have been negative, GC chlamydia have been negative.  Only finding was subcentimeter right epididymal cyst. He has been taking hydrocodone / acetaminophen    - continue prn percocet     Recurrent major depressive disorder, in partial remission  Patient has recurrent depression which is moderate and is currently controlled. Will Continue anti-depressant medications. We will not consult psychiatry at this time. Patient does not display psychosis at this time. Continue to monitor closely and adjust plan of care as needed.    - continue ropinirole qhs    Chronic diastolic congestive heart failure  Patient is identified as having Diastolic (HFpEF) heart failure that is Chronic. CHF is currently controlled. Latest ECHO performed  "and demonstrates- Results for orders placed during the hospital encounter of 11/29/22    Echo    Interpretation Summary  · The left ventricle is normal in size with mildly decreased systolic function. The estimated ejection fraction is 45-50%.  · There is mild left ventricular global hypokinesis.  · Normal right ventricular size with normal right ventricular systolic function.  · Grade I left ventricular diastolic dysfunction.  · The estimated PA systolic pressure is 33 mmHg.  · Normal central venous pressure (3 mmHg).  . Continue Beta Blocker and monitor clinical status closely. Monitor on telemetry. Patient is off CHF pathway.  Monitor strict Is&Os and daily weights.  Place on fluid restriction of 1.5 L. Cardiology has not been consulted. Continue to stress to patient importance of self efficacy and  on diet for CHF. Last BNP reviewed- and noted below   Recent Labs   Lab 04/02/24  1239   *       Type 2 diabetes mellitus without complication, without long-term current use of insulin  Patient's FSGs are controlled on current medication regimen.  Last A1c reviewed-   Lab Results   Component Value Date    HGBA1C 5.9 (H) 02/29/2024     Most recent fingerstick glucose reviewed- No results for input(s): "POCTGLUCOSE" in the last 24 hours.  Current correctional scale  Low  Maintain anti-hyperglycemic dose as follows-   Antihyperglycemics (From admission, onward)      Start     Stop Route Frequency Ordered    04/02/24 1542  insulin aspart U-100 pen 0-5 Units         -- SubQ Before meals & nightly PRN 04/02/24 1442          Hold Oral hypoglycemics while patient is in the hospital.    GERD (gastroesophageal reflux disease)  - continue protonix    Hyponatremia  Patient has hyponatremia which is controlled,We will aim to correct the sodium by 4-6mEq in 24 hours. We will monitor sodium Daily. The hyponatremia is due to Dehydration/hypovolemia and Heart Failure. We will obtain the following studies: Urine sodium, " urine osmolality, serum osmolality. We will treat the hyponatremia with IV fluids as follows: s/p 500 ml bolus x1. The patient's sodium results have been reviewed and are listed below.  Recent Labs   Lab 04/02/24  1239   *       Fatty liver  Likely d/t chronic alcohol abuse  - trending LFTs daily    Idiopathic chronic gout of multiple sites without tophus  - continue allopurinol    Hyperlipidemia associated with type 2 diabetes mellitus  - formulary statin      VTE Risk Mitigation (From admission, onward)           Ordered     apixaban tablet 5 mg  2 times daily         04/02/24 1424     IP VTE HIGH RISK PATIENT  Once         04/02/24 1424     Place sequential compression device  Until discontinued         04/02/24 1424     Reason for No Pharmacological VTE Prophylaxis  Once        Question:  Reasons:  Answer:  Already adequately anticoagulated on oral Anticoagulants    04/02/24 1424                         On 04/02/2024, patient should be placed in hospital observation services under my care in collaboration with Dr. Samuels.           Stephenie Quiñones PA-C  Department of Hospital Medicine  [facility] - Emergency Dept

## 2024-04-02 NOTE — SUBJECTIVE & OBJECTIVE
"Past Medical History:   Diagnosis Date    A-fib     Alcohol abuse     Anxiety     Arthritis     Chronic gout     Diabetes mellitus     DM (diabetes mellitus) 11/16/2016    "boarderline, not taking any meds currently"    Fatty liver     Hyperlipidemia     Renal cell cancer 2014    S/P TKR (total knee replacement), right 06/27/2019       Past Surgical History:   Procedure Laterality Date    ABSCESS DRAINAGE      perirectal    COLONOSCOPY      HAND SURGERY Left     JOINT REPLACEMENT      knee replacement right knee    KIDNEY SURGERY      partial left kidney removal - [place]    PARTIAL NEPHRECTOMY Left August 2014    PERCUTANEOUS CRYOTHERAPY OF PERIPHERAL NERVE USING LIQUID NITROUS OXIDE IN CLOSED NEEDLE DEVICE Right 6/17/2019    Procedure: CRYOTHERAPY, NERVE, PERIPHERAL, PERCUTANEOUS, USING LIQUID NITROUS OXIDE IN CLOSED NEEDLE DEVICE-right knee iovera;  Surgeon: Donny Hair III, MD;  Location: [facility] CATH LAB;  Service: Pain Management;  Laterality: Right;    TOTAL KNEE ARTHROPLASTY Right 6/27/2019    Procedure: ARTHROPLASTY, KNEE, TOTAL-SAME DAY;  Surgeon: Mikael Huerta MD;  Location: [facility] OR [address] [place] [address];  Service: Orthopedics;  Laterality: Right;       Review of patient's allergies indicates:   Allergen Reactions    No known drug allergies        Current Facility-Administered Medications on File Prior to Encounter   Medication    [COMPLETED] diazePAM injection 5 mg    [COMPLETED] iohexoL (OMNIPAQUE 350) injection 100 mL    [COMPLETED] ketorolac injection 15 mg    [COMPLETED] metoprolol injection 5 mg    [COMPLETED] metoprolol injection 5 mg    [COMPLETED] metoprolol succinate (TOPROL-XL) 24 hr tablet 25 mg    [COMPLETED] ondansetron injection 4 mg    [COMPLETED] sodium chloride 0.9% bolus 1,000 mL 1,000 mL     Current Outpatient Medications on File Prior to Encounter   Medication Sig    allopurinoL (ZYLOPRIM) 100 MG tablet TAKE 2 TABLETS(200 MG) BY MOUTH EVERY DAY    ALPRAZolam (XANAX) 0.5 MG tablet Take 1 " tablet (0.5 mg total) by mouth nightly as needed for Anxiety.    cyanocobalamin (VITAMIN B-12) 1000 MCG tablet Take 1 tablet (1,000 mcg total) by mouth once daily.    diclofenac sodium (VOLTAREN) 1 % Gel Apply 2 g topically 3 (three) times daily as needed (Right knee - hand pain).    dronedarone (MULTAQ) 400 mg Tab Take 1 tablet (400 mg total) by mouth 2 (two) times daily with meals.    ELIQUIS 5 mg Tab TAKE 1 TABLET BY MOUTH TWICE DAILY (Patient taking differently: Take 5 mg by mouth 2 (two) times daily.)    HYDROcodone-acetaminophen (NORCO) 5-325 mg per tablet Take 1 tablet by mouth every 12 (twelve) hours as needed for Pain.    ketoconazole (NIZORAL) 2 % cream Apply topically once daily. Apply to affected skin from toes to heels twice a day for 3-4 weeks.    magnesium oxide (MAG-OX) 400 mg (241.3 mg magnesium) tablet Take 1 tablet (400 mg total) by mouth once daily.    metFORMIN (GLUCOPHAGE-XR) 500 MG ER 24hr tablet Take 2 tablets (1,000 mg total) by mouth 2 (two) times daily with meals.    metoprolol succinate (TOPROL-XL) 25 MG 24 hr tablet Take 1 tablet (25 mg total) by mouth once daily.    multivitamin (THERAGRAN) tablet Take 1 tablet by mouth once daily.    omega-3 acid ethyl esters (LOVAZA) 1 gram capsule Take 2 g by mouth 2 (two) times daily.    pantoprazole (PROTONIX) 40 MG tablet Take 1 tablet (40 mg total) by mouth once daily.    rOPINIRole (REQUIP) 1 MG tablet TAKE 1 TABLET(1 MG) BY MOUTH EVERY EVENING    rosuvastatin (CRESTOR) 20 MG tablet TAKE 1 TABLET(20 MG) BY MOUTH EVERY DAY    sertraline (ZOLOFT) 50 MG tablet Take 1 tablet (50 mg total) by mouth once daily.    tamsulosin (FLOMAX) 0.4 mg Cap TAKE 1 CAPSULE(0.4 MG) BY MOUTH EVERY DAY (Patient taking differently: Take 0.4 mg by mouth every evening.)    VITAMIN B-1 100 MG tablet TAKE 1 TABLET BY MOUTH ONCE DAILY    folic acid (FOLVITE) 1 MG tablet Take 1 tablet (1 mg total) by mouth once daily.     Family History       Problem Relation (Age of Onset)     Alcohol abuse Brother    Alzheimer's disease Mother, Brother    Cancer Father, Sister    Colon cancer Father    Colon polyps Brother    Diabetes Mother    Lung cancer Father, Sister          Tobacco Use    Smoking status: Never    Smokeless tobacco: Never   Substance and Sexual Activity    Alcohol use: Yes     Alcohol/week: 168.0 standard drinks of alcohol     Types: 168 Cans of beer per week     Comment: 12-24  beers daily    Drug use: No    Sexual activity: Not Currently     Partners: Female     Review of Systems   Constitutional:  Positive for activity change, diaphoresis and fatigue. Negative for chills and fever.   HENT:  Negative for congestion, rhinorrhea and sore throat.    Respiratory:  Positive for shortness of breath. Negative for cough, chest tightness and wheezing.    Cardiovascular:  Positive for palpitations. Negative for chest pain and leg swelling.   Gastrointestinal:  Negative for abdominal distention, abdominal pain, blood in stool, constipation, diarrhea, nausea and vomiting.   Genitourinary:  Negative for difficulty urinating, dysuria and frequency.   Musculoskeletal:  Negative for arthralgias and back pain.   Neurological:  Positive for weakness. Negative for dizziness, tremors, seizures, syncope, light-headedness, numbness and headaches.   Psychiatric/Behavioral:  Negative for agitation, confusion and hallucinations.      Objective:     Vital Signs (Most Recent):  Temp: 98 °F (36.7 °C) (04/02/24 1231)  Pulse: 67 (04/02/24 1347)  Resp: 17 (04/02/24 1347)  BP: (!) 103/59 (04/02/24 1347)  SpO2: 98 % (04/02/24 1347) Vital Signs (24h Range):  Temp:  [97.9 °F (36.6 °C)-98.4 °F (36.9 °C)] 98 °F (36.7 °C)  Pulse:  [] 67  Resp:  [14-97] 17  SpO2:  [96 %-98 %] 98 %  BP: ()/() 103/59     Weight: 81.6 kg (180 lb)  Body mass index is 29.05 kg/m².     Physical Exam  Vitals and nursing note reviewed.   Constitutional:       General: He is not in acute distress.     Appearance: He is  well-developed. He is obese. He is not ill-appearing or diaphoretic.   HENT:      Head: Normocephalic and atraumatic.      Right Ear: External ear normal.      Left Ear: External ear normal.      Nose: Nose normal. No congestion.      Mouth/Throat:      Pharynx: Oropharynx is clear.   Eyes:      General: No scleral icterus.     Extraocular Movements: Extraocular movements intact.   Cardiovascular:      Rate and Rhythm: Normal rate and regular rhythm.      Pulses: Normal pulses.      Heart sounds: Normal heart sounds. No murmur heard.  Pulmonary:      Effort: Pulmonary effort is normal. No respiratory distress.      Breath sounds: Normal breath sounds. No wheezing or rales.   Abdominal:      General: Bowel sounds are normal. There is no distension.      Palpations: Abdomen is soft.      Tenderness: There is no abdominal tenderness. There is no guarding or rebound.   Musculoskeletal:      Cervical back: Normal range of motion.      Right lower leg: No edema.      Left lower leg: No edema.   Skin:     General: Skin is warm and dry.      Capillary Refill: Capillary refill takes less than 2 seconds.   Neurological:      General: No focal deficit present.      Mental Status: He is alert and oriented to person, place, and time. Mental status is at baseline.   Psychiatric:         Mood and Affect: Mood normal.         Behavior: Behavior normal.         Thought Content: Thought content normal.                Significant Labs: All pertinent labs within the past 24 hours have been reviewed.  CBC:   Recent Labs   Lab 04/01/24  1446 04/02/24  1239   WBC 7.65 7.28   HGB 16.2 15.9   HCT 47.6 47.3    156     CMP:   Recent Labs   Lab 04/01/24  1446 04/02/24  1239   * 133*   K 4.6 4.9   CL 94* 98   CO2 18* 18*   * 226*   BUN 8 11   CREATININE 0.9 1.4   CALCIUM 9.5 10.1   PROT 8.5* 7.7   ALBUMIN 4.4 4.1   BILITOT 0.4 0.8   ALKPHOS 91 80   * 150*   ALT 58* 82*   ANIONGAP 16 17*     Cardiac Markers:   Recent  Labs   Lab 04/02/24  1239   *     Magnesium:   Recent Labs   Lab 04/02/24  1239   MG 1.8       Significant Imaging: I have reviewed all pertinent imaging results/findings within the past 24 hours.  Imaging Results              X-Ray Chest AP Portable (Final result)  Result time 04/02/24 13:41:35      Final result by Mode Jaime MD (04/02/24 13:41:35)                   Impression:      No significant intrathoracic abnormality.  No significant detrimental interval change in the appearance of the chest since 10/28/2023 is appreciated, allowing for a considerably poorer inspiratory depth level on the current exam.      Electronically signed by: Mode Jaime MD  Date:    04/02/2024  Time:    13:41               Narrative:    EXAMINATION:  XR CHEST AP PORTABLE    CLINICAL HISTORY:  palpitations;    TECHNIQUE:  One view    COMPARISON:  Comparison is made to 10/28/2023.  Clinical information of tachycardia/palpitations.    FINDINGS:  Allowing for magnification of the cardiomediastinal silhouette related both to projection and to a relatively poor inspiratory depth level, the heart is not significantly enlarged and the appearance of the cardiomediastinal silhouette demonstrates no detrimental change since the examination referenced above.  Pulmonary vascularity is normal, and there are no findings indicating current cardiac decompensation.  Lung zones are clear, and are free of significant airspace consolidation or volume loss.  No pleural fluid.  No pneumothorax.

## 2024-04-02 NOTE — DISCHARGE INSTRUCTIONS
Please resume taking all her medications including your tamsulosin, Multaq, metoprolol, and Protonix.  We have referred you to gastroenterology for your duodenitis and hepatomegaly.  We advise stopping drinking alcohol as this can cause duodenitis.    Your sodium was slightly low at 128.  He received IV normal saline which should elevate it.  However, please follow up with your primary doctor in 1-2 weeks to make sure it is not dropping.

## 2024-04-02 NOTE — ASSESSMENT & PLAN NOTE
Patient is identified as having Diastolic (HFpEF) heart failure that is Chronic. CHF is currently controlled. Latest ECHO performed and demonstrates- Results for orders placed during the hospital encounter of 11/29/22    Echo    Interpretation Summary  · The left ventricle is normal in size with mildly decreased systolic function. The estimated ejection fraction is 45-50%.  · There is mild left ventricular global hypokinesis.  · Normal right ventricular size with normal right ventricular systolic function.  · Grade I left ventricular diastolic dysfunction.  · The estimated PA systolic pressure is 33 mmHg.  · Normal central venous pressure (3 mmHg).  . Continue Beta Blocker and monitor clinical status closely. Monitor on telemetry. Patient is off CHF pathway.  Monitor strict Is&Os and daily weights.  Place on fluid restriction of 1.5 L. Cardiology has not been consulted. Continue to stress to patient importance of self efficacy and  on diet for CHF. Last BNP reviewed- and noted below   Recent Labs   Lab 04/02/24  1239   *

## 2024-04-02 NOTE — PROGRESS NOTES
Spoke with Pt regarding his recent ED visit. Pt scheduled by ED Navigator with a PCP appt on 4-10-24 and Gastro on 5-8-24. Pt is aware and provided with address. Pt denies having any other concerns at this time. ED Navigator will continue to f/u.

## 2024-04-02 NOTE — PLAN OF CARE
Discharge Planning Assessment:  Patient admitted on: 4-2-24  Chart reviewed, Care plan discussed with treatment team,   attending Dr Samuels    PCP updated in Epic: yes  Current Dispo: new Obs admit  Mr Abadie wants to discuss the [facility] IOP with Dr Larry soon, wants to slow down his etoh intake.   Case management to follow

## 2024-04-02 NOTE — ASSESSMENT & PLAN NOTE
Patient has recurrent depression which is moderate and is currently controlled. Will Continue anti-depressant medications. We will not consult psychiatry at this time. Patient does not display psychosis at this time. Continue to monitor closely and adjust plan of care as needed.    - continue ropinirole qhs

## 2024-04-02 NOTE — PROGRESS NOTES
CC:  ER follow-up     HPI:  The patient is a 68-year-old male with AFib, alcohol abuse, hypertension, hyperlipidemia, elevated triglycerides, type 2 diabetes, reflux, gout, renal cell cancer status post nephrectomy anxiety who presents today with his daughter as an ER follow-up.  The patient is found to be diaphoretic heart rate of 140-150 beats per minute.  We can not get a blood pressure reading.    Assessment:  AFib     Plan:  1. The patient's daughter will take him straight to emergency room.  The emergency department was contacted regarding in transfer.

## 2024-04-03 VITALS
DIASTOLIC BLOOD PRESSURE: 63 MMHG | TEMPERATURE: 98 F | WEIGHT: 183 LBS | RESPIRATION RATE: 16 BRPM | OXYGEN SATURATION: 99 % | HEIGHT: 66 IN | HEART RATE: 58 BPM | BODY MASS INDEX: 29.41 KG/M2 | SYSTOLIC BLOOD PRESSURE: 109 MMHG

## 2024-04-03 LAB
ALBUMIN SERPL BCP-MCNC: 3.5 G/DL (ref 3.5–5.2)
ALP SERPL-CCNC: 63 U/L (ref 55–135)
ALT SERPL W/O P-5'-P-CCNC: 60 U/L (ref 10–44)
ANION GAP SERPL CALC-SCNC: 8 MMOL/L (ref 8–16)
AST SERPL-CCNC: 93 U/L (ref 10–40)
BASOPHILS # BLD AUTO: 0.03 K/UL (ref 0–0.2)
BASOPHILS NFR BLD: 0.6 % (ref 0–1.9)
BILIRUB SERPL-MCNC: 0.8 MG/DL (ref 0.1–1)
BUN SERPL-MCNC: 8 MG/DL (ref 8–23)
CALCIUM SERPL-MCNC: 9.3 MG/DL (ref 8.7–10.5)
CHLORIDE SERPL-SCNC: 99 MMOL/L (ref 95–110)
CO2 SERPL-SCNC: 23 MMOL/L (ref 23–29)
CREAT SERPL-MCNC: 1 MG/DL (ref 0.5–1.4)
DIFFERENTIAL METHOD BLD: ABNORMAL
EOSINOPHIL # BLD AUTO: 0.1 K/UL (ref 0–0.5)
EOSINOPHIL NFR BLD: 2.3 % (ref 0–8)
ERYTHROCYTE [DISTWIDTH] IN BLOOD BY AUTOMATED COUNT: 16.3 % (ref 11.5–14.5)
EST. GFR  (NO RACE VARIABLE): >60 ML/MIN/1.73 M^2
GLUCOSE SERPL-MCNC: 106 MG/DL (ref 70–110)
HCT VFR BLD AUTO: 38.8 % (ref 40–54)
HGB BLD-MCNC: 12.6 G/DL (ref 14–18)
IMM GRANULOCYTES # BLD AUTO: 0.02 K/UL (ref 0–0.04)
IMM GRANULOCYTES NFR BLD AUTO: 0.4 % (ref 0–0.5)
LYMPHOCYTES # BLD AUTO: 1.3 K/UL (ref 1–4.8)
LYMPHOCYTES NFR BLD: 27.4 % (ref 18–48)
MAGNESIUM SERPL-MCNC: 1.6 MG/DL (ref 1.6–2.6)
MCH RBC QN AUTO: 30.2 PG (ref 27–31)
MCHC RBC AUTO-ENTMCNC: 32.5 G/DL (ref 32–36)
MCV RBC AUTO: 93 FL (ref 82–98)
MONOCYTES # BLD AUTO: 0.8 K/UL (ref 0.3–1)
MONOCYTES NFR BLD: 15.6 % (ref 4–15)
NEUTROPHILS # BLD AUTO: 2.6 K/UL (ref 1.8–7.7)
NEUTROPHILS NFR BLD: 53.7 % (ref 38–73)
NRBC BLD-RTO: 0 /100 WBC
OHS QRS DURATION: 174 MS
OHS QTC CALCULATION: 507 MS
PLATELET # BLD AUTO: 127 K/UL (ref 150–450)
PMV BLD AUTO: 9.5 FL (ref 9.2–12.9)
POCT GLUCOSE: 99 MG/DL (ref 70–110)
POTASSIUM SERPL-SCNC: 3.6 MMOL/L (ref 3.5–5.1)
PROT SERPL-MCNC: 6.3 G/DL (ref 6–8.4)
RBC # BLD AUTO: 4.17 M/UL (ref 4.6–6.2)
SODIUM SERPL-SCNC: 130 MMOL/L (ref 136–145)
WBC # BLD AUTO: 4.81 K/UL (ref 3.9–12.7)

## 2024-04-03 PROCEDURE — A4216 STERILE WATER/SALINE, 10 ML: HCPCS | Performed by: PHYSICIAN ASSISTANT

## 2024-04-03 PROCEDURE — 85025 COMPLETE CBC W/AUTO DIFF WBC: CPT | Performed by: PHYSICIAN ASSISTANT

## 2024-04-03 PROCEDURE — 80053 COMPREHEN METABOLIC PANEL: CPT | Performed by: PHYSICIAN ASSISTANT

## 2024-04-03 PROCEDURE — G0378 HOSPITAL OBSERVATION PER HR: HCPCS

## 2024-04-03 PROCEDURE — 25000003 PHARM REV CODE 250: Performed by: HOSPITALIST

## 2024-04-03 PROCEDURE — 83735 ASSAY OF MAGNESIUM: CPT | Performed by: PHYSICIAN ASSISTANT

## 2024-04-03 PROCEDURE — 36415 COLL VENOUS BLD VENIPUNCTURE: CPT | Performed by: PHYSICIAN ASSISTANT

## 2024-04-03 PROCEDURE — 25000003 PHARM REV CODE 250: Performed by: PHYSICIAN ASSISTANT

## 2024-04-03 RX ORDER — DICLOFENAC SODIUM 10 MG/G
2 GEL TOPICAL EVERY 8 HOURS PRN
Status: DISCONTINUED | OUTPATIENT
Start: 2024-04-03 | End: 2024-04-03 | Stop reason: HOSPADM

## 2024-04-03 RX ADMIN — SERTRALINE HYDROCHLORIDE 50 MG: 50 TABLET ORAL at 09:04

## 2024-04-03 RX ADMIN — DRONEDARONE 400 MG: 400 TABLET, FILM COATED ORAL at 09:04

## 2024-04-03 RX ADMIN — ATORVASTATIN CALCIUM 80 MG: 40 TABLET, FILM COATED ORAL at 09:04

## 2024-04-03 RX ADMIN — CYANOCOBALAMIN TAB 250 MCG 1000 MCG: 250 TAB at 09:04

## 2024-04-03 RX ADMIN — ALLOPURINOL 200 MG: 100 TABLET ORAL at 09:04

## 2024-04-03 RX ADMIN — Medication 10 ML: at 05:04

## 2024-04-03 RX ADMIN — DICLOFENAC SODIUM 2 G: 10 GEL TOPICAL at 04:04

## 2024-04-03 RX ADMIN — Medication 100 MG: at 09:04

## 2024-04-03 RX ADMIN — APIXABAN 5 MG: 5 TABLET, FILM COATED ORAL at 09:04

## 2024-04-03 RX ADMIN — DIAZEPAM 10 MG: 5 TABLET ORAL at 05:04

## 2024-04-03 RX ADMIN — HYDROCODONE BITARTRATE AND ACETAMINOPHEN 1 TABLET: 5; 325 TABLET ORAL at 04:04

## 2024-04-03 RX ADMIN — FOLIC ACID 1 MG: 1 TABLET ORAL at 09:04

## 2024-04-03 RX ADMIN — PANTOPRAZOLE SODIUM 40 MG: 40 TABLET, DELAYED RELEASE ORAL at 09:04

## 2024-04-03 NOTE — PLAN OF CARE
[facility] - Med Surg  Discharge Final Note    Primary Care Provider: Bacilio Larry MD    Expected Discharge Date: 4/3/2024    Pt discharged home with no needs.  Pt had transportation home.  Pt discharged prior to SW setting up OPCM.      Future Appointments   Date Time Provider Department Center   4/10/2024 10:00 AM Bacilio Larry MD [facility]   5/8/2024 10:40 AM Vira Zimmerman, ZULEMA [facility]   5/16/2024  9:00 AM Sofia Montero NP [facility] [place]   8/8/2024 11:00 AM Ame Arzate MD [facility] [place]     Discharge Plan A and Plan B have been determined by review of patient's clinical status, future medical and therapeutic needs, and coverage/benefits for post-acute care in coordination with multidisciplinary team members.      Final Discharge Note (most recent)       Final Note - 04/03/24 1358          Final Note    Assessment Type Final Discharge Note     Anticipated Discharge Disposition Home or Self Care     What phone number can be called within the next 1-3 days to see how you are doing after discharge? [phone]        Post-Acute Status    Post-Acute Authorization Other     Other Status No Post-Acute Service Needs     Discharge Delays None known at this time                     Important Message from Medicare             Contact Info       Bacilio Larry MD   Specialty: Internal Medicine   Relationship: PCP - General    [address]  [facility] [address]   Phone: [phone]       Next Steps: Follow up in 3 day(s)          Beatriz Johnson LMSW  Part-Time-  [facility]  Ext. [phone]

## 2024-04-03 NOTE — DISCHARGE SUMMARY
[facility] Medicine  Discharge Summary      Patient Name: Donald Warren Abadie  MRN: [id number]  CARLA: [id number]  Patient Class: OP- Observation  Admission Date: 4/2/2024  Hospital Length of Stay: 0 days  Discharge Date and Time:  04/03/2024 1:15 PM  Attending Physician: Bhakti att. providers found   Discharging Provider: Jack Samuels MD  Primary Care Provider: Bacilio Larry MD  [facility] Medicine Team: [facility] Jack Samuels MD  Primary Care Team: [facility]    HPI:   Mr. Donald Warren Abadie is a 68-year-old male past medical history alcohol abuse, AFib, gout, diabetes who presents w/ c/o palpitations. Today he was being followed in clinic outpatient, and suddenly felt weak. He was found to be diaphoretic, complaining of palpitations. Family brought him in for assessment given he was just seen in ED yesterday for hyponatremia and Afib RVR. Pt denies associated chest pain, but did have some SOB associated. He is a daily drinker, up to 30 beers daily. Last drink was yesterday w/ only 1 beer. He did not take his metoprolol today d/t confusion on whether to hold his metformin or metoprolol following IV contrast received yesterday. He denies any fevers, chills, chest pain, dyspnea, nausea, vomiting, abdominal pain, syncope, or any trauma.    In ED, initially hypotensive, HR 150s. No leukocytosis or anemia. Na 133. . , ALT 82. , trop 0.006. CXR: No significant intrathoracic abnormality. ECG w/ NSR, HR 72. No ST elevations. BP improved s/p 500 ml NS bolus. HR improved w/ IVF and IV mtp 5 mg x1. Given thiamine, IV ativan and folic acid.    * No surgery found *      Hospital Course:   Patient received NS bolus 500ml and metoprolol 5mg IV once with improvement in BP and HR. Patient remained stable overnight. CIWA 0. JASS resolved with next am labs. Patient is currently medically and HDS. He is being d/c home. FU with PCP.  Plan of care discussed with patient and family at  bedside, verbalized understanding. All questions were answered.        Goals of Care Treatment Preferences:  Code Status: Full Code      Consults:     No new Assessment & Plan notes have been filed under this hospital service since the last note was generated.  Service: Hospital Medicine    Final Active Diagnoses:    Diagnosis Date Noted POA    PRINCIPAL PROBLEM:  Atrial fibrillation with RVR [I48.91] 05/22/2014 Yes    JASS (acute kidney injury) [N17.9] 04/02/2024 Yes    Benign prostatic hyperplasia with nocturia [N40.1, R35.1] 03/13/2024 Yes    Testicular pain, right [N50.811] 03/13/2024 Yes    Recurrent major depressive disorder, in partial remission [F33.41] 06/19/2023 Yes    Chronic diastolic congestive heart failure [I50.32] 11/04/2022 Yes    Type 2 diabetes mellitus without complication, without long-term current use of insulin [E11.9] 10/07/2019 Yes    Alcohol use disorder, moderate, dependence [F10.20]  Yes    GERD (gastroesophageal reflux disease) [K21.9] 04/02/2019 Yes     Chronic    Hyponatremia [E87.1] 03/15/2019 Yes    Fatty liver [K76.0] 07/03/2014 Yes    Idiopathic chronic gout of multiple sites without tophus [M1A.09X0] 07/23/2012 Yes    Hyperlipidemia associated with type 2 diabetes mellitus [E11.69, E78.5]  Yes     Chronic      Problems Resolved During this Admission:       Discharged Condition: stable    Disposition: Home or Self Care    Follow Up:   Follow-up Information       Bacilio Larry MD Follow up in 3 day(s).    Specialty: Internal Medicine  Contact information:  [address]  [place] [address] [phone]                           Patient Instructions:   No discharge procedures on file.    Significant Diagnostic Studies: N/A    Pending Diagnostic Studies:       None           Medications:  Reconciled Home Medications:      Medication List        CHANGE how you take these medications      ELIQUIS 5 mg Tab  Generic drug: apixaban  TAKE 1 TABLET BY MOUTH TWICE DAILY  What  changed: how much to take     tamsulosin 0.4 mg Cap  Commonly known as: FLOMAX  TAKE 1 CAPSULE(0.4 MG) BY MOUTH EVERY DAY  What changed: when to take this            CONTINUE taking these medications      allopurinoL 100 MG tablet  Commonly known as: ZYLOPRIM  TAKE 2 TABLETS(200 MG) BY MOUTH EVERY DAY     ALPRAZolam 0.5 MG tablet  Commonly known as: XANAX  Take 1 tablet (0.5 mg total) by mouth nightly as needed for Anxiety.     cyanocobalamin 1000 MCG tablet  Commonly known as: VITAMIN B-12  Take 1 tablet (1,000 mcg total) by mouth once daily.     diclofenac sodium 1 % Gel  Commonly known as: VOLTAREN  Apply 2 g topically 3 (three) times daily as needed (Right knee - hand pain).     dronedarone 400 mg Tab  Commonly known as: MULTAQ  Take 1 tablet (400 mg total) by mouth 2 (two) times daily with meals.     folic acid 1 MG tablet  Commonly known as: FOLVITE  Take 1 tablet (1 mg total) by mouth once daily.     HYDROcodone-acetaminophen 5-325 mg per tablet  Commonly known as: NORCO  Take 1 tablet by mouth every 12 (twelve) hours as needed for Pain.     ketoconazole 2 % cream  Commonly known as: NIZORAL  Apply topically once daily. Apply to affected skin from toes to heels twice a day for 3-4 weeks.     magnesium oxide 400 mg (241.3 mg magnesium) tablet  Commonly known as: MAG-OX  Take 1 tablet (400 mg total) by mouth once daily.     metFORMIN 500 MG ER 24hr tablet  Commonly known as: GLUCOPHAGE-XR  Take 2 tablets (1,000 mg total) by mouth 2 (two) times daily with meals.     metoprolol succinate 25 MG 24 hr tablet  Commonly known as: TOPROL-XL  Take 1 tablet (25 mg total) by mouth once daily.     multivitamin tablet  Commonly known as: THERAGRAN  Take 1 tablet by mouth once daily.     omega-3 acid ethyl esters 1 gram capsule  Commonly known as: LOVAZA  Take 2 g by mouth 2 (two) times daily.     pantoprazole 40 MG tablet  Commonly known as: PROTONIX  Take 1 tablet (40 mg total) by mouth once daily.     rOPINIRole 1 MG  tablet  Commonly known as: REQUIP  TAKE 1 TABLET(1 MG) BY MOUTH EVERY EVENING     rosuvastatin 20 MG tablet  Commonly known as: CRESTOR  TAKE 1 TABLET(20 MG) BY MOUTH EVERY DAY     sertraline 50 MG tablet  Commonly known as: ZOLOFT  Take 1 tablet (50 mg total) by mouth once daily.     VITAMIN B-1 100 MG tablet  Generic drug: thiamine  TAKE 1 TABLET BY MOUTH ONCE DAILY              Indwelling Lines/Drains at time of discharge:   Lines/Drains/Airways       None                   Time spent on the discharge of patient: 35 minutes         Jack Samuels MD  Department of Hospital Medicine  [facility]

## 2024-04-03 NOTE — HOSPITAL COURSE
Patient received NS bolus 500ml and metoprolol 5mg IV once with improvement in BP and HR. Patient remained stable overnight. CIWA 0. JASS resolved with next am labs. Patient is currently medically and HDS. He is being d/c home. Plan of care discussed with patient and family at bedside, verbalized understanding. All questions were answered.

## 2024-04-03 NOTE — NURSING
"Pt is very agitated and pace in room now.Pt stated "I CAN'T SLEEP ."I just gave him melatonin Pt  b/p low 91/54 p 77 map 66 Notified omc team Md made aware via secure chat.  "

## 2024-04-03 NOTE — PLAN OF CARE
Problem: Fluid and Electrolyte Imbalance (Acute Kidney Injury/Impairment)  Goal: Fluid and Electrolyte Balance  Outcome: Ongoing, Progressing     Problem: Fluid and Electrolyte Imbalance (Acute Kidney Injury/Impairment)  Goal: Fluid and Electrolyte Balance  Outcome: Ongoing, Progressing     Problem: Renal Function Impairment (Acute Kidney Injury/Impairment)  Goal: Effective Renal Function  Outcome: Ongoing, Progressing     Problem: Renal Function Impairment (Acute Kidney Injury/Impairment)  Goal: Effective Renal Function  Outcome: Ongoing, Progressing

## 2024-04-09 ENCOUNTER — PATIENT OUTREACH (OUTPATIENT)
Dept: EMERGENCY MEDICINE | Facility: HOSPITAL | Age: 69
End: 2024-04-09
Payer: MEDICARE

## 2024-04-09 DIAGNOSIS — N45.1 EPIDIDYMITIS, RIGHT: ICD-10-CM

## 2024-04-09 DIAGNOSIS — F41.9 ANXIETY: ICD-10-CM

## 2024-04-09 RX ORDER — ALPRAZOLAM 0.5 MG/1
0.5 TABLET ORAL NIGHTLY PRN
Qty: 30 TABLET | Refills: 0 | OUTPATIENT
Start: 2024-04-09

## 2024-04-09 RX ORDER — HYDROCODONE BITARTRATE AND ACETAMINOPHEN 5; 325 MG/1; MG/1
1 TABLET ORAL EVERY 12 HOURS PRN
Qty: 30 TABLET | Refills: 0 | OUTPATIENT
Start: 2024-04-09

## 2024-04-09 NOTE — TELEPHONE ENCOUNTER
Pt has scheduled in office appt tomorrow, says he need rx's filled today asap.    Refill Request.

## 2024-04-09 NOTE — TELEPHONE ENCOUNTER
----- Message from Balta Hernandez sent at 4/9/2024 12:48 PM CDT -----  Contact: Pt  [phone]  Requesting an RX refill or new RX.  Is this a refill or new RX: refill  RX name and strength (copy/paste from chart):  HYDROcodone-acetaminophen (NORCO) 5-325 mg per tablet  Is this a 30 day or 90 day RX:   Pharmacy name and phone # (copy/paste from chart): [id number] - [place] - [facility] AT [facility] [address]   Phone: [phone]  Fax: [phone]    The doctors have asked that we provide their patients with the following 2 reminders -- prescription refills can take up to 72 hours, and a friendly reminder that in the future you can use your [facility] account to request refills: yesRequesting an RX refill or new RX.  Is this a refill or new RX:   RX name and strength (copy/paste from chart): ALPRAZolam (XANAX) 0.5 MG tablet   Is this a 30 day or 90 day RX:   Pharmacy name and phone # (copy/paste from chart):    The doctors have asked that we provide their patients with the following 2 reminders -- prescription refills can take up to 72 hours, and a friendly reminder that in the future you can use your [facility] account to request refills: yes    Pt states he is completley out of the Hydrocodone he is in pain he cannot wait until tomorrow.

## 2024-04-09 NOTE — TELEPHONE ENCOUNTER
I saw it was denied saying refill not appropriate. Is there any other information I can get to explain to pt since he stating that he has used all medication and need refill today. In case I need to forward to MARLEEN Soares to speak with pt.    Please and Thank you   Declined

## 2024-04-09 NOTE — TELEPHONE ENCOUNTER
----- Message from Barbara Ha sent at 4/9/2024 10:36 AM CDT -----  Contact: Pt  [phone]  Would like to receive medical advice.  Pharmacy name/number (copy/paste from chart):      [id number] - [place] [facility] [address]  AT [facility] [address]  [address] [place] [address]  Phone: [phone] Fax: [phone]    Would they like a call back or a response via [facility]:  Call Back   Additional information:      Pt is calling to see why his HYDROcodone-acetaminophen (NORCO) 5-325 mg per tablet and ALPRAZolam (XANAX) 0.5 MG tablet was denied by the office. He says he can not wait until tomorrow to receive the medication.

## 2024-04-09 NOTE — TELEPHONE ENCOUNTER
Spoke with pt informed of denial of rx refills and reason why. Informed pt to come to appt tomorrow to speak with Provider about medications.          Bacilio Larry MD   to Me       4/9/24  2:34 PM   Patient is actively drinking. He is drinking too much to be on pain medication or anxiety medication.The combination of alcohol and pain medication or anxiety medication could kill him.

## 2024-04-09 NOTE — PROGRESS NOTES
Call placed to remind Pt of his appt scheduled for 10 am on 4-10-24 with his PCP. Detailed v/m left.

## 2024-04-12 NOTE — PROGRESS NOTES
Call placed per ED Navigator to f/u from last encounter. No answer. V/m left. ED Navigator will continue to f/u periodically and assist as needed.

## 2024-04-22 ENCOUNTER — HOSPITAL ENCOUNTER (EMERGENCY)
Facility: HOSPITAL | Age: 69
Discharge: HOME | End: 2024-04-22
Attending: EMERGENCY MEDICINE
Payer: MEDICARE

## 2024-04-22 VITALS
SYSTOLIC BLOOD PRESSURE: 141 MMHG | TEMPERATURE: 98 F | DIASTOLIC BLOOD PRESSURE: 70 MMHG | OXYGEN SATURATION: 97 % | WEIGHT: 180 LBS | BODY MASS INDEX: 28.93 KG/M2 | HEART RATE: 90 BPM | HEIGHT: 66 IN | RESPIRATION RATE: 22 BRPM

## 2024-04-22 DIAGNOSIS — E83.42 HYPOMAGNESEMIA: ICD-10-CM

## 2024-04-22 DIAGNOSIS — I48.91 ATRIAL FIBRILLATION: ICD-10-CM

## 2024-04-22 DIAGNOSIS — R06.02 SHORTNESS OF BREATH: ICD-10-CM

## 2024-04-22 DIAGNOSIS — R06.02 SOB (SHORTNESS OF BREATH): ICD-10-CM

## 2024-04-22 DIAGNOSIS — N17.9 AKI (ACUTE KIDNEY INJURY): Primary | ICD-10-CM

## 2024-04-22 DIAGNOSIS — I48.91 A-FIB: ICD-10-CM

## 2024-04-22 LAB
ALBUMIN SERPL BCP-MCNC: 4.1 G/DL (ref 3.5–5.2)
ALP SERPL-CCNC: 84 U/L (ref 55–135)
ALT SERPL W/O P-5'-P-CCNC: 79 U/L (ref 10–44)
ANION GAP SERPL CALC-SCNC: 14 MMOL/L (ref 8–16)
AST SERPL-CCNC: 144 U/L (ref 10–40)
BASOPHILS # BLD AUTO: 0.05 K/UL (ref 0–0.2)
BASOPHILS NFR BLD: 0.7 % (ref 0–1.9)
BILIRUB SERPL-MCNC: 2.1 MG/DL (ref 0.1–1)
BNP SERPL-MCNC: 516 PG/ML (ref 0–99)
BUN SERPL-MCNC: 8 MG/DL (ref 8–23)
CALCIUM SERPL-MCNC: 9.1 MG/DL (ref 8.7–10.5)
CHLORIDE SERPL-SCNC: 100 MMOL/L (ref 95–110)
CO2 SERPL-SCNC: 20 MMOL/L (ref 23–29)
CREAT SERPL-MCNC: 1.5 MG/DL (ref 0.5–1.4)
DIFFERENTIAL METHOD BLD: ABNORMAL
EOSINOPHIL # BLD AUTO: 0.2 K/UL (ref 0–0.5)
EOSINOPHIL NFR BLD: 2.1 % (ref 0–8)
ERYTHROCYTE [DISTWIDTH] IN BLOOD BY AUTOMATED COUNT: 15.8 % (ref 11.5–14.5)
EST. GFR  (NO RACE VARIABLE): 50.1 ML/MIN/1.73 M^2
GLUCOSE SERPL-MCNC: 147 MG/DL (ref 70–110)
HCT VFR BLD AUTO: 46.9 % (ref 40–54)
HGB BLD-MCNC: 15.6 G/DL (ref 14–18)
IMM GRANULOCYTES # BLD AUTO: 0.03 K/UL (ref 0–0.04)
IMM GRANULOCYTES NFR BLD AUTO: 0.4 % (ref 0–0.5)
LYMPHOCYTES # BLD AUTO: 2 K/UL (ref 1–4.8)
LYMPHOCYTES NFR BLD: 26.6 % (ref 18–48)
MAGNESIUM SERPL-MCNC: 1.2 MG/DL (ref 1.6–2.6)
MCH RBC QN AUTO: 30.1 PG (ref 27–31)
MCHC RBC AUTO-ENTMCNC: 33.3 G/DL (ref 32–36)
MCV RBC AUTO: 91 FL (ref 82–98)
MONOCYTES # BLD AUTO: 0.6 K/UL (ref 0.3–1)
MONOCYTES NFR BLD: 8.6 % (ref 4–15)
NEUTROPHILS # BLD AUTO: 4.6 K/UL (ref 1.8–7.7)
NEUTROPHILS NFR BLD: 61.6 % (ref 38–73)
NRBC BLD-RTO: 0 /100 WBC
OHS QRS DURATION: 72 MS
OHS QRS DURATION: 80 MS
OHS QRS DURATION: 84 MS
OHS QTC CALCULATION: 430 MS
OHS QTC CALCULATION: 465 MS
OHS QTC CALCULATION: 483 MS
PLATELET # BLD AUTO: 154 K/UL (ref 150–450)
PMV BLD AUTO: 9.4 FL (ref 9.2–12.9)
POTASSIUM SERPL-SCNC: 3.7 MMOL/L (ref 3.5–5.1)
PROT SERPL-MCNC: 7.5 G/DL (ref 6–8.4)
RBC # BLD AUTO: 5.18 M/UL (ref 4.6–6.2)
SODIUM SERPL-SCNC: 134 MMOL/L (ref 136–145)
TROPONIN I SERPL DL<=0.01 NG/ML-MCNC: <0.006 NG/ML (ref 0–0.03)
WBC # BLD AUTO: 7.48 K/UL (ref 3.9–12.7)

## 2024-04-22 PROCEDURE — 99285 EMERGENCY DEPT VISIT HI MDM: CPT | Mod: 25

## 2024-04-22 PROCEDURE — 25000003 PHARM REV CODE 250: Mod: HCNC | Performed by: STUDENT IN AN ORGANIZED HEALTH CARE EDUCATION/TRAINING PROGRAM

## 2024-04-22 PROCEDURE — 96366 THER/PROPH/DIAG IV INF ADDON: CPT

## 2024-04-22 PROCEDURE — 93005 ELECTROCARDIOGRAM TRACING: CPT | Mod: HCNC

## 2024-04-22 PROCEDURE — 93010 ELECTROCARDIOGRAM REPORT: CPT | Mod: 76,HCNC,, | Performed by: INTERNAL MEDICINE

## 2024-04-22 PROCEDURE — 84484 ASSAY OF TROPONIN QUANT: CPT | Mod: HCNC | Performed by: STUDENT IN AN ORGANIZED HEALTH CARE EDUCATION/TRAINING PROGRAM

## 2024-04-22 PROCEDURE — 96368 THER/DIAG CONCURRENT INF: CPT

## 2024-04-22 PROCEDURE — 63600175 PHARM REV CODE 636 W HCPCS: Mod: HCNC | Performed by: STUDENT IN AN ORGANIZED HEALTH CARE EDUCATION/TRAINING PROGRAM

## 2024-04-22 PROCEDURE — 80053 COMPREHEN METABOLIC PANEL: CPT | Mod: HCNC | Performed by: STUDENT IN AN ORGANIZED HEALTH CARE EDUCATION/TRAINING PROGRAM

## 2024-04-22 PROCEDURE — 96365 THER/PROPH/DIAG IV INF INIT: CPT

## 2024-04-22 PROCEDURE — 83880 ASSAY OF NATRIURETIC PEPTIDE: CPT | Mod: HCNC | Performed by: STUDENT IN AN ORGANIZED HEALTH CARE EDUCATION/TRAINING PROGRAM

## 2024-04-22 PROCEDURE — 96375 TX/PRO/DX INJ NEW DRUG ADDON: CPT

## 2024-04-22 PROCEDURE — 83735 ASSAY OF MAGNESIUM: CPT | Mod: HCNC | Performed by: STUDENT IN AN ORGANIZED HEALTH CARE EDUCATION/TRAINING PROGRAM

## 2024-04-22 PROCEDURE — 93010 ELECTROCARDIOGRAM REPORT: CPT | Mod: HCNC,,, | Performed by: INTERNAL MEDICINE

## 2024-04-22 PROCEDURE — 85025 COMPLETE CBC W/AUTO DIFF WBC: CPT | Mod: HCNC | Performed by: STUDENT IN AN ORGANIZED HEALTH CARE EDUCATION/TRAINING PROGRAM

## 2024-04-22 RX ORDER — METOPROLOL TARTRATE 1 MG/ML
5 INJECTION, SOLUTION INTRAVENOUS EVERY 5 MIN PRN
Status: DISCONTINUED | OUTPATIENT
Start: 2024-04-22 | End: 2024-04-22 | Stop reason: HOSPADM

## 2024-04-22 RX ORDER — ONDANSETRON HYDROCHLORIDE 2 MG/ML
4 INJECTION, SOLUTION INTRAVENOUS
Status: COMPLETED | OUTPATIENT
Start: 2024-04-22 | End: 2024-04-22

## 2024-04-22 RX ORDER — METOPROLOL SUCCINATE 25 MG/1
25 TABLET, EXTENDED RELEASE ORAL
Status: COMPLETED | OUTPATIENT
Start: 2024-04-22 | End: 2024-04-22

## 2024-04-22 RX ORDER — MAGNESIUM SULFATE HEPTAHYDRATE 40 MG/ML
2 INJECTION, SOLUTION INTRAVENOUS
Status: COMPLETED | OUTPATIENT
Start: 2024-04-22 | End: 2024-04-22

## 2024-04-22 RX ADMIN — METOPROLOL SUCCINATE 25 MG: 25 TABLET, EXTENDED RELEASE ORAL at 10:04

## 2024-04-22 RX ADMIN — MAGNESIUM SULFATE HEPTAHYDRATE 2 G: 40 INJECTION, SOLUTION INTRAVENOUS at 12:04

## 2024-04-22 RX ADMIN — ONDANSETRON 4 MG: 2 INJECTION INTRAMUSCULAR; INTRAVENOUS at 10:04

## 2024-04-22 RX ADMIN — DRONEDARONE 400 MG: 400 TABLET, FILM COATED ORAL at 12:04

## 2024-04-22 RX ADMIN — APIXABAN 5 MG: 5 TABLET, FILM COATED ORAL at 10:04

## 2024-04-22 RX ADMIN — METOROPROLOL TARTRATE 5 MG: 5 INJECTION, SOLUTION INTRAVENOUS at 10:04

## 2024-04-22 RX ADMIN — SODIUM CHLORIDE, POTASSIUM CHLORIDE, SODIUM LACTATE AND CALCIUM CHLORIDE 1000 ML: 600; 310; 30; 20 INJECTION, SOLUTION INTRAVENOUS at 10:04

## 2024-04-22 NOTE — ED NOTES
MD Chadwick at bedside and aware of pt hypotension and verbized to placed cardioversion pads on pt prior to administering metoprolol

## 2024-04-22 NOTE — ED TRIAGE NOTES
Pt arrives to ED via EMS with c/o SOB. Pt has a hx of afib and presents with tachycardia. Pt denies CP. Pt states difficulty with taking prescribed medication as instructed.

## 2024-04-22 NOTE — ED PROVIDER NOTES
"Encounter Date: 4/22/2024       History     Chief Complaint   Patient presents with    Shortness of Breath     Admitted month ago     68-year-old male past medical history alcohol abuse, anxiety, Afib on Eliquis, gout, restless leg syndrome, diabetes who presents to ED with complaint of palpitations. He states that he was cutting the grass yesterday when he suddenly started experiencing palpitations. He then could not feel his pulse. He also reports worsening dyspnea on exertion since yesterday. His son went over this morning and found patient's pulse ox showing O2 saturations between 84-88%.He states that this has happened before when he has gone into a-fib. Patient also endorses nausea and vomiting which his son states is a chronic issue. He last vomited two days ago. He Denies fevers, chills, chest pain. He smokes marijuana about once per week. Denies use of EtOH. He drinks 6-8 beers daily. Patient reports compliance with Eliquis. His son states that he had missed a day or two of his Metoprolol and Multaq. He took his Multaq and two Metoprolol yesterday after feeling that he went into a-fib. He did not take any medications today.       Review of patient's allergies indicates:   Allergen Reactions    No known drug allergies      Past Medical History:   Diagnosis Date    A-fib     Alcohol abuse     Anxiety     Arthritis     Chronic gout     Diabetes mellitus     DM (diabetes mellitus) 11/16/2016    "boarderline, not taking any meds currently"    Fatty liver     Hyperlipidemia     Renal cell cancer 2014    S/P TKR (total knee replacement), right 06/27/2019     Past Surgical History:   Procedure Laterality Date    ABSCESS DRAINAGE      perirectal    COLONOSCOPY      HAND SURGERY Left     JOINT REPLACEMENT      knee replacement right knee    KIDNEY SURGERY      partial left kidney removal - [place]    PARTIAL NEPHRECTOMY Left August 2014    PERCUTANEOUS CRYOTHERAPY OF PERIPHERAL NERVE USING LIQUID NITROUS OXIDE IN CLOSED " NEEDLE DEVICE Right 6/17/2019    Procedure: CRYOTHERAPY, NERVE, PERIPHERAL, PERCUTANEOUS, USING LIQUID NITROUS OXIDE IN CLOSED NEEDLE DEVICE-right knee iovera;  Surgeon: Donny Hair III, MD;  Location: [facility] CATH LAB;  Service: Pain Management;  Laterality: Right;    TOTAL KNEE ARTHROPLASTY Right 6/27/2019    Procedure: ARTHROPLASTY, KNEE, TOTAL-SAME DAY;  Surgeon: Mikael Huerta MD;  Location: [facility] OR [address] [place] [address];  Service: Orthopedics;  Laterality: Right;     Family History   Problem Relation Name Age of Onset    Lung cancer Father      Colon cancer Father      Cancer Father          lung cancer    Diabetes Mother      Alzheimer's disease Mother      Lung cancer Sister      Cancer Sister          lung    Colon polyps Brother      Alcohol abuse Brother      Alzheimer's disease Brother      Cirrhosis Neg Hx       Social History     Tobacco Use    Smoking status: Never    Smokeless tobacco: Never   Substance Use Topics    Alcohol use: Yes     Alcohol/week: 168.0 standard drinks of alcohol     Types: 168 Cans of beer per week     Comment: 12-24  beers daily    Drug use: No         Physical Exam     Initial Vitals [04/22/24 0932]   BP Pulse Resp Temp SpO2   116/65 (!) 128 20 97.7 °F (36.5 °C) 97 %      MAP       --         Physical Exam    Nursing note and vitals reviewed.  Constitutional: He is not diaphoretic. No distress.   HENT:   Head: Normocephalic and atraumatic.   Mouth/Throat: Oropharynx is clear and moist.   Eyes: Conjunctivae and EOM are normal. Pupils are equal, round, and reactive to light.   Neck: Neck supple.   Normal range of motion.  Cardiovascular:  Normal heart sounds and intact distal pulses.           Irregularly irregular tachycardic rhythm   Pulmonary/Chest: Breath sounds normal. He has no wheezes. He has no rhonchi. He has no rales.   Abdominal: Abdomen is soft. He exhibits no distension. There is no abdominal tenderness.   Musculoskeletal:         General: No tenderness or edema.  Normal range of motion.      Cervical back: Normal range of motion and neck supple.     Neurological: He is alert and oriented to person, place, and time. He has normal strength. No cranial nerve deficit or sensory deficit.   Skin: Skin is warm and dry. Capillary refill takes less than 2 seconds.         ED Course   Procedures  Labs Reviewed   CBC W/ AUTO DIFFERENTIAL - Abnormal; Notable for the following components:       Result Value    RDW 15.8 (*)     All other components within normal limits   COMPREHENSIVE METABOLIC PANEL - Abnormal; Notable for the following components:    Sodium 134 (*)     CO2 20 (*)     Glucose 147 (*)     Creatinine 1.5 (*)     Total Bilirubin 2.1 (*)      (*)     ALT 79 (*)     eGFR 50.1 (*)     All other components within normal limits   B-TYPE NATRIURETIC PEPTIDE - Abnormal; Notable for the following components:     (*)     All other components within normal limits   MAGNESIUM - Abnormal; Notable for the following components:    Magnesium 1.2 (*)     All other components within normal limits   TROPONIN I        ECG Results              Repeat EKG 12-lead (Final result)        Collection Time Result Time QRS Duration OHS QTC Calculation    04/22/24 11:40:11 04/22/24 14:24:46 72 465                     Final result by Interface, Lab In [facility] (04/22/24 14:24:57)                   Narrative:    Test Reason : I48.91,    Vent. Rate : 104 BPM     Atrial Rate : 394 BPM     P-R Int : 000 ms          QRS Dur : 072 ms      QT Int : 354 ms       P-R-T Axes : 000 -20 -22 degrees     QTc Int : 465 ms    Atrial fibrillation with rapid ventricular response  Low voltage QRS  Cannot rule out Anterior infarct ,age undetermined  Abnormal ECG  When compared with ECG of 22-APR-2024 10:10,  Non-specific change in ST segment in Anterior leads  Nonspecific T wave abnormality now evident in Anterior leads  Confirmed by NATALIA REED MD ([id number]) on 4/22/2024 2:24:43 PM    Referred By: System  System           Confirmed By:NATALIA REED MD                                     EKG 12-lead (Final result)        Collection Time Result Time QRS Duration OHS QTC Calculation    04/22/24 10:10:36 04/22/24 12:48:54 80 483                     Final result by Interface, Lab In [facility] (04/22/24 12:49:00)                   Narrative:    Test Reason : I48.91,    Vent. Rate : 116 BPM     Atrial Rate : 108 BPM     P-R Int : 000 ms          QRS Dur : 080 ms      QT Int : 348 ms       P-R-T Axes : 000 029 -22 degrees     QTc Int : 483 ms    Atrial fibrillation with rapid ventricular response  Nonspecific T wave abnormality  Abnormal ECG  When compared with ECG of 22-APR-2024 09:33,  No significant change was found  Confirmed by NATALIA REED MD ([id number]) on 4/22/2024 12:48:53 PM    Referred By: System System           Confirmed By:NATALIA REED MD                                     EKG 12-lead (Final result)        Collection Time Result Time QRS Duration OHS QTC Calculation    04/22/24 09:33:32 04/22/24 12:31:13 84 430                     Final result by Interface, Lab In [facility] (04/22/24 12:31:18)                   Narrative:    Test Reason : R06.02,    Vent. Rate : 127 BPM     Atrial Rate : 000 BPM     P-R Int : 000 ms          QRS Dur : 084 ms      QT Int : 296 ms       P-R-T Axes : 000 -22 -40 degrees     QTc Int : 430 ms    Atrial fibrillation  Nonspecific T wave abnormality  Abnormal ECG  When compared with ECG of 02-APR-2024 12:52,  Atrial fibrillation has replaced Sinus rhythm  Vent. rate has increased BY  55 BPM  Nonspecific T wave abnormality, worse in Inferior leads  Confirmed by NATALIA REED MD ([id number]) on 4/22/2024 12:31:11 PM    Referred By:             Confirmed By:NATALIA REED MD                                  Imaging Results              X-Ray Chest AP Portable (Final result)  Result time 04/22/24 10:59:08      Final result by Ramon Golden MD (04/22/24 10:59:08)                    Impression:      See above      Electronically signed by: Ramon Golden MD  Date:    04/22/2024  Time:    10:59               Narrative:    EXAMINATION:  XR CHEST AP PORTABLE    CLINICAL HISTORY:  Shortness of breath    TECHNIQUE:  Single frontal view of the chest was performed.    COMPARISON:  04/02/2024    FINDINGS:  Cardiac size is normal.  Lungs are clear with no infiltrate or vascular congestion.                                       Medications   lactated ringers bolus 1,000 mL (0 mLs Intravenous Stopped 4/22/24 1140)   apixaban tablet 5 mg (5 mg Oral Given 4/22/24 1046)   metoprolol succinate (TOPROL-XL) 24 hr tablet 25 mg (25 mg Oral Given 4/22/24 1046)   dronedarone tablet 400 mg (400 mg Oral Given 4/22/24 1249)   ondansetron injection 4 mg (4 mg Intravenous Given 4/22/24 1048)   magnesium sulfate 2g in water 50mL IVPB (premix) (0 g Intravenous Stopped 4/22/24 1344)     Medical Decision Making  Differential diagnosis includes but is not limited to arrhythmia, dehydration, electrolyte derangement, infection    Upon initial presentation, patient is tachycardic to the 120s with a stable blood pressure.  He is satting well on room air.  AFib with RVR in 120s-130s.  Throughout our encounter, patient's blood pressure dropped to SBP 90s.  He was complaining of generalized fatigue but remained oriented x4.  Placed on patient.  He was given 5 mg of IV metoprolol with improvement of HR and BP. Patient also received IVF and his home Metoprolol and Multaq. Labs notable for hypomagnesemia and JASS. Mg repleted in ED. Discussed importance of medication compliance with patient and his son. Patient also counseled to abstain from alcohol and smoking. He was encouraged to drink water and stay hydrated. He was offered admission and declined, requesting to go home.  Patient advised to follow up with his PCP regarding rechecking kidney function and magnesium levels.  She was also advised to follow up with his cardiologist  regarding AFib.  He verbalized understanding and agreement with plan.  Patient discharged home with return precautions.  All questions answered.    Amount and/or Complexity of Data Reviewed  Labs: ordered.  Radiology: ordered.    Risk  Prescription drug management.                                      Clinical Impression:  Final diagnoses:  [R06.02] SOB (shortness of breath)  [I48.91] A-fib  [R06.02] Shortness of breath  [N17.9] JASS (acute kidney injury) (Primary)  [E83.42] Hypomagnesemia  [I48.91] Atrial fibrillation          ED Disposition Condition    Discharge Stable          ED Prescriptions    None       Follow-up Information    None          Fide Chadwick MD  Resident  04/23/24 1714

## 2024-04-22 NOTE — DISCHARGE INSTRUCTIONS
Home Care Instructions:  - Medications: Continue taking your home medications as prescribed. It is very important that you take all of your medications.   - You were treated for an acute kidney injury. It is important that you drink water regularly and avoid alcohol so that your kidney injury resolves.   - Take magnesium supplement for your low magnesia level.  - Avoid smoking and alcohol.     Follow-Up Plan:  - Follow-up with: Primary care doctor to follow-up your kidney function and magnesia level. Cardiology should also see you to follow-up about your a-fib.  - Additional testing and/or evaluation will be directed by your primary doctor    Return to the Emergency Department for symptoms including but not limited to: worsening symptoms, severe back pain, shortness of breath or chest pain, vomiting with inability to hold down fluids, blood in vomit or poop, fevers greater than 100.4°F, passing out/fainting/unconsciousness, or other concerning symptoms.

## 2024-04-23 ENCOUNTER — TELEPHONE (OUTPATIENT)
Dept: INTERNAL MEDICINE | Facility: CLINIC | Age: 69
End: 2024-04-23
Payer: MEDICARE

## 2024-04-23 ENCOUNTER — PATIENT OUTREACH (OUTPATIENT)
Dept: EMERGENCY MEDICINE | Facility: HOSPITAL | Age: 69
End: 2024-04-23
Payer: MEDICARE

## 2024-04-23 ENCOUNTER — TELEPHONE (OUTPATIENT)
Dept: UROLOGY | Facility: CLINIC | Age: 69
End: 2024-04-23
Payer: MEDICARE

## 2024-04-23 NOTE — TELEPHONE ENCOUNTER
----- Message from Madelaine Root sent at 4/23/2024 12:45 PM CDT -----  Contact: Son in law/Rafita/[phone]  Requesting an RX refill or new RX.  Is this a refill or new RX: New  RX name and strength  dronedarone (MULTAQ) 400 mg Tab  Is this a 30 day or 90 day RX:   Pharmacy name and phone # :  [email or web address] #[id number] - [place] - [address]  [address] [facility] [address]  [address] [place] [address]  Phone: [phone] Fax: [phone]     The doctors have asked that we provide their patients with the following 2 reminders -- prescription refills can take up to 72 hours, and a friendly reminder that in the future you can use your [facility] account to request refills: pt's son in law stated that patient needs a PA in order for the medication to be approved by his insurance

## 2024-04-23 NOTE — PROGRESS NOTES
Spoke with Pt regarding his recent ED visit for SOB. Pt states he is doing a whole lot better and is no longer experiencing the SOB. He does not want to schedule any f/u at this time. ED Navigator will continue to f/u and remind of upcoming appt for his AWV on 5-16-24.

## 2024-04-23 NOTE — TELEPHONE ENCOUNTER
----- Message from Lu Liao sent at 4/23/2024  3:26 PM CDT -----  Type:  Needs Medical Advice    Who Called: Pt   Would the patient rather a call back or a response via [facility]? Callback   Best Call Back Number:  [phone]  Additional Information: Pt is requesting a callback in regards to appt

## 2024-04-23 NOTE — TELEPHONE ENCOUNTER
Spoke with son in law who called in rx and he stated the pharmacy to him that a PA is needed to get this prescription filled. DId you receive any fax for this medication needing a PA?     Disp Refills Start End LAILA   dronedarone (MULTAQ) 400 mg Tab 60 tablet 11 10/31/2023 10/30/2024 No   Sig - Route: Take 1 tablet (400 mg total) by mouth 2 (two) times daily with meals. - Oral

## 2024-04-24 ENCOUNTER — OFFICE VISIT (OUTPATIENT)
Dept: UROLOGY | Facility: CLINIC | Age: 69
End: 2024-04-24
Payer: MEDICARE

## 2024-04-24 VITALS
BODY MASS INDEX: 28.57 KG/M2 | HEIGHT: 66 IN | HEART RATE: 65 BPM | SYSTOLIC BLOOD PRESSURE: 112 MMHG | DIASTOLIC BLOOD PRESSURE: 62 MMHG | WEIGHT: 177.81 LBS

## 2024-04-24 DIAGNOSIS — N50.811 TESTICULAR PAIN, RIGHT: Primary | ICD-10-CM

## 2024-04-24 LAB
BACTERIA #/AREA URNS AUTO: ABNORMAL /HPF
BILIRUB SERPL-MCNC: ABNORMAL MG/DL
BLOOD URINE, POC: ABNORMAL
CLARITY, POC UA: CLEAR
COLOR, POC UA: YELLOW
GLUCOSE UR QL STRIP: ABNORMAL
HYALINE CASTS UR QL AUTO: 3 /LPF
KETONES UR QL STRIP: ABNORMAL
LEUKOCYTE ESTERASE URINE, POC: ABNORMAL
MICROSCOPIC COMMENT: ABNORMAL
NITRITE, POC UA: ABNORMAL
PH, POC UA: 5
PROTEIN, POC: ABNORMAL
RBC #/AREA URNS AUTO: 1 /HPF (ref 0–4)
SPECIFIC GRAVITY, POC UA: 1.02
SQUAMOUS #/AREA URNS AUTO: 0 /HPF
UROBILINOGEN, POC UA: ABNORMAL
WBC #/AREA URNS AUTO: 18 /HPF (ref 0–5)
WBC CLUMPS UR QL AUTO: ABNORMAL

## 2024-04-24 PROCEDURE — 99213 OFFICE O/P EST LOW 20 MIN: CPT | Mod: S$GLB,,,

## 2024-04-24 PROCEDURE — 3008F BODY MASS INDEX DOCD: CPT | Mod: CPTII,S$GLB,,

## 2024-04-24 PROCEDURE — 3044F HG A1C LEVEL LT 7.0%: CPT | Mod: CPTII,S$GLB,,

## 2024-04-24 PROCEDURE — 81001 URINALYSIS AUTO W/SCOPE: CPT | Mod: HCNC

## 2024-04-24 PROCEDURE — 3074F SYST BP LT 130 MM HG: CPT | Mod: CPTII,S$GLB,,

## 2024-04-24 PROCEDURE — 1160F RVW MEDS BY RX/DR IN RCRD: CPT | Mod: CPTII,S$GLB,,

## 2024-04-24 PROCEDURE — 1125F AMNT PAIN NOTED PAIN PRSNT: CPT | Mod: CPTII,S$GLB,,

## 2024-04-24 PROCEDURE — 81002 URINALYSIS NONAUTO W/O SCOPE: CPT | Mod: S$GLB,,,

## 2024-04-24 PROCEDURE — 99999 PR PBB SHADOW E&M-EST. PATIENT-LVL IV: CPT | Mod: PBBFAC,HCNC,,

## 2024-04-24 PROCEDURE — 1159F MED LIST DOCD IN RCRD: CPT | Mod: CPTII,S$GLB,,

## 2024-04-24 PROCEDURE — 3078F DIAST BP <80 MM HG: CPT | Mod: CPTII,S$GLB,,

## 2024-04-24 PROCEDURE — 87086 URINE CULTURE/COLONY COUNT: CPT | Mod: HCNC

## 2024-04-24 RX ORDER — SULFAMETHOXAZOLE AND TRIMETHOPRIM 800; 160 MG/1; MG/1
1 TABLET ORAL 2 TIMES DAILY
Qty: 14 TABLET | Refills: 0 | Status: SHIPPED | OUTPATIENT
Start: 2024-04-24 | End: 2024-05-01

## 2024-04-24 NOTE — PROGRESS NOTES
Subjective:       Patient ID: Donald Warren Abadie is a 68 y.o. male.    Chief Complaint: right testicular pain     This is a 68 y.o.  male patient that is an established patient of mine.  This patient was last seen over a month ago for right testiclar pain that is constant.  US of scrotum and testicles 2/22/24: right epididymal head cyst  US of abdomen 2/27/24: no evidence of right inguinal hernia.  Urine culture 2/29/24: no growth for infection  GC and Chlamydia: negative   Denies hematuria, penile discharge, penile pain, pelvic pain, flank pain. Denies ever having kidney stones.   3/28/24: Saw Deacon Goddard NP with right testicular pain and reported that the only thing that helps with the pain is hydrocodone.  Liyah offered a referral to pain management but patient declined. Instructed to rest pelvic floor for 2 weeks and no heavy lifting.  4/24/24: Reportsright testicular pain is 10/10. Reports that the pain got worse after cutting the grass yesterday. Reports that hydrocodone works but he is out of the medication. He applys volatern cream which helps some. Reports that he wear boxer briefs. Denies any changes in urinary symptoms. Takes flomax for weak stream and urinary frequency.  Urine dip: positive for leukocytes and RBC, negative for Nitrites.    LAST PSA  Lab Results   Component Value Date    PSA 3.8 08/09/2017    PSA 1.5 02/06/2015    PSA 1.92 04/04/2013    PSA 1.95 04/25/2012    PSA 1.8 01/12/2011    PSA 1.6 03/23/2010    PSA 2.0 03/09/2009    PSA 1.1 05/28/2007    PSADIAG 1.6 12/01/2017       Lab Results   Component Value Date    CREATININE 1.5 (H) 04/22/2024       ---  PMH/PSH/Medications/Allergies/Social history reviewed and as in chart.    Review of Systems   Constitutional:  Negative for activity change, chills and fever.   Respiratory:  Negative for shortness of breath.    Cardiovascular:  Negative for chest pain and palpitations.   Gastrointestinal:  Negative for abdominal pain and  constipation.   Genitourinary:  Positive for testicular pain. Negative for difficulty urinating, dysuria, flank pain, frequency, hematuria, penile discharge, penile pain, penile swelling, scrotal swelling and urgency.   Neurological:  Negative for dizziness and light-headedness.     Objective:      Physical Exam  HENT:      Head: Normocephalic.   Pulmonary:      Effort: Pulmonary effort is normal.   Abdominal:      General: Abdomen is flat.      Palpations: Abdomen is soft.   Genitourinary:     Penis: Normal.       Testes:         Right: Tenderness present. Mass or swelling not present.         Left: Mass, tenderness or swelling not present.      Comments: Epididymal head tenderness with palpation.  Musculoskeletal:         General: Normal range of motion.   Skin:     General: Skin is warm and dry.   Neurological:      Mental Status: He is alert and oriented to person, place, and time.       Assessment:     Problem Noted   Testicular Pain, Right 3/13/2024   Benign Prostatic Hyperplasia With Nocturia 3/13/2024       Plan:     Start taking bactrim BID x7 days. Reviwed side effects of medication and patient verbalized understanding.   Urine sent for ua micro and urine culture  Referral for pain management placed.   Continue NSAID's, supportive underwear, jock strap with activities, ice/warm compresses, scrotal elevation for testicular pain.  Follow-up 1 month    ISA Osullivan spent a total of 25 minutes on the day of the visit.This includes face to face time and non-face to face time preparing to see the patient (eg, review of tests), obtaining and/or reviewing separately obtained history, documenting clinical information in the electronic or other health record, independently interpreting results and communicating results to the patient/family/caregiver, or care coordinator.

## 2024-04-25 ENCOUNTER — TELEPHONE (OUTPATIENT)
Dept: PAIN MEDICINE | Facility: CLINIC | Age: 69
End: 2024-04-25
Payer: MEDICARE

## 2024-04-25 ENCOUNTER — TELEPHONE (OUTPATIENT)
Dept: INTERNAL MEDICINE | Facility: CLINIC | Age: 69
End: 2024-04-25
Payer: MEDICARE

## 2024-04-25 DIAGNOSIS — I48.91 ATRIAL FIBRILLATION WITH RVR: Primary | ICD-10-CM

## 2024-04-25 NOTE — TELEPHONE ENCOUNTER
Dr Tolentino reviewed message and scheduled pt for appointment with Electrophysiology , Dr Angel.    If Atrial fibrillation reoccurs to go to the emergency room.    Pt stated understanding.

## 2024-04-25 NOTE — TELEPHONE ENCOUNTER
See prior msg's already spoke with pt, Dr. Larry(PCP), [facility] and [facility]) and trying to contact anyone in Cardiology for assistance.

## 2024-04-25 NOTE — TELEPHONE ENCOUNTER
Spoke with [facility] about dronedarone 400 mg and the pharmacist stated that [facility] is NOT covering this medication and not authorizing a PA.    Spoke with [facility] Pharmacist to get alternative medication for Dronedarone 400 mg                 Alternative medication is Amiodarone 200 mg which is cheaper than 100 mg per [facility] Pharmacist. Please send to local [facility] pharmacy ASAP.

## 2024-04-25 NOTE — TELEPHONE ENCOUNTER
This medication was ordered when pt was in the Hospital in October 2023 by Cardiology step down unit. Pt is in need of a refill.    Refill Request.

## 2024-04-25 NOTE — TELEPHONE ENCOUNTER
Sandra Tapia RN Clark, Yvonne, MA  Caller: [facility]/ [phone] (Today,  9:40 AM)  Dr Tolentino will take care of the precription and Cardiology follow up

## 2024-04-25 NOTE — TELEPHONE ENCOUNTER
I attempted to contact pt in regards to message in the portal. Pt didn't answer. I lvm asking for a call back. ----- Message from Shagufta Ayala sent at 4/25/2024  9:56 AM CDT -----  Type:  Same Day Appointment Request    Caller is requesting a same day appointment.  Caller declined first available appointment listed below.    Name of Caller:pt  When is the first available appointment?04/26/24  Symptoms:Abdominal Pain  Best Call Back Number: [phone]  Additional Information:

## 2024-04-25 NOTE — TELEPHONE ENCOUNTER
----- Message from Madelaine Root sent at 4/25/2024  9:40 AM CDT -----  Contact: Self/ [phone]  1MEDICALADVICE     Patient is calling for Medical Advice regarding:problems with getting medication     Pharmacy name and phone#: [id number] - [place] - [address]  [address]  [place] [address]  Phone: [phone] Fax: [phone]       Would like response via [email or web address]:  No, Would like a return call     Comments: pt said that he is calling in regards to he was told by Taras that his insurance does not want to cover the dronedarone (MULTAQ) 400 mg Tab pt stated that he needs the medication for his heart and wants to know if he can get a rx for 10 pills until the doctor can get the medication approved because he is currently in Afib. Please advise

## 2024-04-25 NOTE — TELEPHONE ENCOUNTER
----- Message from Trena Dorsey sent at 4/25/2024  7:05 AM CDT -----  Regarding: Refill Prescription  Contact: Kleber [phone]  Requesting an RX refill or new RX.  Is this a refill or new RX: Refill  RX name and strength (copy/paste from chart):  dronedarone (MULTAQ) 400 mg Tab  Is this a 30 day or 90 day RX:     [facility] #[id number] - [place] - [address]  [address]  [place] [address]  Phone: [phone] Fax: [phone]        Pharmacy name and phone # (copy/paste from chart):    The doctors have asked that we provide their patients with the following 2 reminders -- prescription refills can take up to 72 hours, and a friendly reminder that in the future you can use your [facility] account to request refills:

## 2024-04-25 NOTE — TELEPHONE ENCOUNTER
Spoke with pt informed that Cardiology is filling his medication but that it may be changed to amiodarone since his insurance will not cover dronedarone and sent to local [facility]. Also informed that an appt will be scheduled with Cardiology.

## 2024-04-25 NOTE — TELEPHONE ENCOUNTER
----- Message from Mk Delvalle sent at 4/25/2024  2:23 PM CDT -----  Contact: [phone]@patient  Good afternoon patient would like a call back to discuss getting a refill of his med dronedarone (MULTAQ) 400 mg Tab. Please call patient to advise [phone]

## 2024-04-26 LAB — BACTERIA UR CULT: NORMAL

## 2024-04-29 ENCOUNTER — TELEPHONE (OUTPATIENT)
Dept: INTERNAL MEDICINE | Facility: CLINIC | Age: 69
End: 2024-04-29
Payer: MEDICARE

## 2024-04-29 ENCOUNTER — OFFICE VISIT (OUTPATIENT)
Dept: PAIN MEDICINE | Facility: CLINIC | Age: 69
End: 2024-04-29
Payer: MEDICARE

## 2024-04-29 ENCOUNTER — TELEPHONE (OUTPATIENT)
Dept: UROLOGY | Facility: CLINIC | Age: 69
End: 2024-04-29
Payer: MEDICARE

## 2024-04-29 DIAGNOSIS — R10.31 RIGHT INGUINAL PAIN: ICD-10-CM

## 2024-04-29 DIAGNOSIS — N50.811 TESTICULAR PAIN, RIGHT: Primary | ICD-10-CM

## 2024-04-29 DIAGNOSIS — I48.91 ATRIAL FIBRILLATION WITH RVR: Primary | ICD-10-CM

## 2024-04-29 PROCEDURE — 99999 PR PBB SHADOW E&M-EST. PATIENT-LVL IV: CPT | Mod: PBBFAC,,, | Performed by: STUDENT IN AN ORGANIZED HEALTH CARE EDUCATION/TRAINING PROGRAM

## 2024-04-29 PROCEDURE — 3044F HG A1C LEVEL LT 7.0%: CPT | Mod: CPTII,S$GLB,, | Performed by: STUDENT IN AN ORGANIZED HEALTH CARE EDUCATION/TRAINING PROGRAM

## 2024-04-29 PROCEDURE — 3288F FALL RISK ASSESSMENT DOCD: CPT | Mod: CPTII,S$GLB,, | Performed by: STUDENT IN AN ORGANIZED HEALTH CARE EDUCATION/TRAINING PROGRAM

## 2024-04-29 PROCEDURE — 1159F MED LIST DOCD IN RCRD: CPT | Mod: CPTII,S$GLB,, | Performed by: STUDENT IN AN ORGANIZED HEALTH CARE EDUCATION/TRAINING PROGRAM

## 2024-04-29 PROCEDURE — 1101F PT FALLS ASSESS-DOCD LE1/YR: CPT | Mod: CPTII,S$GLB,, | Performed by: STUDENT IN AN ORGANIZED HEALTH CARE EDUCATION/TRAINING PROGRAM

## 2024-04-29 PROCEDURE — 99204 OFFICE O/P NEW MOD 45 MIN: CPT | Mod: S$GLB,,, | Performed by: STUDENT IN AN ORGANIZED HEALTH CARE EDUCATION/TRAINING PROGRAM

## 2024-04-29 PROCEDURE — 1125F AMNT PAIN NOTED PAIN PRSNT: CPT | Mod: CPTII,S$GLB,, | Performed by: STUDENT IN AN ORGANIZED HEALTH CARE EDUCATION/TRAINING PROGRAM

## 2024-04-29 RX ORDER — AMIODARONE HYDROCHLORIDE 400 MG/1
400 TABLET ORAL 2 TIMES DAILY
Qty: 28 TABLET | Refills: 0 | Status: SHIPPED | OUTPATIENT
Start: 2024-04-29 | End: 2024-05-13

## 2024-04-29 RX ORDER — GABAPENTIN 300 MG/1
300 CAPSULE ORAL 2 TIMES DAILY
Qty: 60 CAPSULE | Refills: 1 | Status: SHIPPED | OUTPATIENT
Start: 2024-04-29 | End: 2024-06-14 | Stop reason: SDUPTHER

## 2024-04-29 RX ORDER — AMIODARONE HYDROCHLORIDE 200 MG/1
200 TABLET ORAL DAILY
Qty: 30 TABLET | Refills: 11 | Status: SHIPPED | OUTPATIENT
Start: 2024-05-13 | End: 2024-06-10 | Stop reason: ALTCHOICE

## 2024-04-29 NOTE — TELEPHONE ENCOUNTER
Spoke with patient, advised that he will take a 2 week loading dose (400 mg BID) and then 200 mg daily. Pt verb understanding, will come in for appointment with Dr. Angel tomorrow. Pharmacy, medications and allergies verified.

## 2024-04-29 NOTE — TELEPHONE ENCOUNTER
----- Message from Lenora Johnson sent at 4/27/2024  8:16 AM CDT -----  Type:  Needs Medical Advice    Who Called: pt  Symptoms (please be specific): Testicular pain  How long has patient had these symptoms:  3 or 4 weeks  [facility] and phone #:  [id number] - [place] - [address]  AT [facility] [address]  [address] [place] [address]  Phone: [phone] Fax: [phone]  Would the patient rather a call back or a response via [facility]? call  Best Call Back Number: [phone]   Additional Information:   Pt requesting medication to be sent to pharmacy

## 2024-04-29 NOTE — PROGRESS NOTES
----- Message from Linda Krishnan sent at 4/29/2024  9:16 AM CDT -----  Contact: pt  Type:  Needs Medical Advice    Who Called: pt    Pharmacy name and phone #:      [facility] - [place], [place] - [address] [facility]  [address] [facility] [address] [address]  Phone: [phone] Fax: [phone]      Would the patient rather a call back or a response via [facility]? Call back    Best Call Back Number: [phone]    Additional Information: everyone is out of stock of the Maunjaro 7.5.    Please resend to above pharm, they said they have in stock    Thanks   Group Psychotherapy (PhD/LCSW)    Site: [facility]    Clinical status of patient: Intensive Outpatient Program (IOP)    Date: 4/26/2019    Group Focus: Stress Management    Length of service: 62728 - 45-50 minutes    Number of patients in attendance: 8    Referred by: Addictive Behavior Unit Treatment Team    Target symptoms: Alcohol Abuse    Patient's response to treatment: Active Listening    Progress toward goals: Progressing adequately    Interval History: Group learned mindfulness techniques (breath, senses, defusion) to improve present-moment awareness, impulsive behavior tendencies, and tolerance of various emotional states.    Diagnosis: Alcohol Use Disorder    Plan: Continue treatment on ABU

## 2024-04-29 NOTE — PROGRESS NOTES
[facility] - New Patient Evaluation    Referred by: Bebe Quinones   Reason for referral: Testicular pain, right     CC:   Chief Complaint   Patient presents with    Groin Pain         4/29/2024    10:47 AM   Last 3 PDI Scores   Pain Disability Index (PDI) 70       Subjective 04/29/2024:   Donald Warren Abadie is a 68 y.o. male who presents complaining of 4 weeks of right testicular pain.  Pain radiates to the abdomen.  He was noted to have a right epididymal head cyst on ultrasound.  He also has a right inguinal hernia on ultrasound.  He has been seen by Urology and was recommended to take NSAIDs, apply warm compresses and wear supportive underwear.  Patient was unable to take NSAIDs due to concurrent blood thinner use.  He was being prescribed [place] by his PCP.  At his most recent urology visit on 04/24/2024, he was noted to have a positive urine sample for UTI.  He was prescribed Bactrim x7 days.    Initial Pain Assessment:  Pain Assessment  Pain Assessment: 0-10  Pain Score: 10-Worst pain ever  Pain Location: Groin (Right Testicular Pain)  Pain Descriptors: Constant, Discomfort  Pain Frequency: Continuous  Pain Onset: 4 weeks  Clinical Progression: Gradually worsening  Aggravating Factors: Bending, Stretching, Straightening, Exercise, Kneeling, Standing  Result of Injury: No  Work-Related Injury: No  Patient's Stated Pain Goal: No pain  Pain Intervention(s): Medication (See eMAR)     Patient denies night fever/night sweats, significant weight loss, significant motor weakness, and loss of sensations.      Previous Interventions:  - None    Previous Therapies:  PT/OT: no   Chiropractor:   HEP:   Relevant Surgery: no   Previous Medications:   - NSAIDS: Avoid due to to  blood thinners  - Muscle Relaxants:    - TCAs:   - SNRIs:   - Topicals:   - Anticonvulsants:    - Opioids: Norco 5/325  - Adjuvants:            - Chronic use of Alprazolam     Current Pain Medications:  Norco    Chronic  Alprazolam use    Review of Systems:  ROS    GENERAL:  No weight loss, malaise or fevers.  HEENT:   No recent changes in vision or hearing  NECK:  No difficulty with swallowing. No stridor.   RESPIRATORY:  Negative for cough, wheezing or shortness of breath, patient denies any recent URI.  CARDIOVASCULAR:  Negative for chest pain, leg swelling or palpitations.  GI:  Negative for abdominal discomfort, blood in stools or black stools or change in bowel habits.  MUSCULOSKELETAL:  See HPI.  SKIN:  Negative for lesions, rash, and itching.  PSYCH:  No mood disorder or recent psychosocial stressors.    HEMATOLOGY/LYMPHOLOGY:  Negative for prolonged bleeding, bruising easily or swollen nodes.  Patient is not currently taking any anti-coagulants  NEURO:   No history of headaches, syncope, paralysis, seizures or tremors.  All other reviewed and negative other than HPI.    History:  Current medications, allergies, medical history, surgical history,   family history, and social history were reviewed in the chart as marked.    Full Medication List:    Current Outpatient Medications:     allopurinoL (ZYLOPRIM) 100 MG tablet, TAKE 2 TABLETS(200 MG) BY MOUTH EVERY DAY, Disp: 180 tablet, Rfl: 0    ALPRAZolam (XANAX) 0.5 MG tablet, Take 1 tablet (0.5 mg total) by mouth nightly as needed for Anxiety., Disp: 30 tablet, Rfl: 0    cyanocobalamin (VITAMIN B-12) 1000 MCG tablet, Take 1 tablet (1,000 mcg total) by mouth once daily., Disp: , Rfl:     diclofenac sodium (VOLTAREN) 1 % Gel, Apply 2 g topically 3 (three) times daily as needed (Right knee - hand pain)., Disp: , Rfl:     ELIQUIS 5 mg Tab, TAKE 1 TABLET BY MOUTH TWICE DAILY (Patient taking differently: Take 5 mg by mouth 2 (two) times daily.), Disp: 180 tablet, Rfl: 3    ketoconazole (NIZORAL) 2 % cream, Apply topically once daily. Apply to affected skin from toes to heels twice a day for 3-4 weeks., Disp: 60 g, Rfl: 6    magnesium oxide (MAG-OX) 400 mg (241.3 mg magnesium) tablet,  Take 1 tablet (400 mg total) by mouth once daily., Disp: 14 tablet, Rfl: 0    metFORMIN (GLUCOPHAGE-XR) 500 MG ER 24hr tablet, Take 2 tablets (1,000 mg total) by mouth 2 (two) times daily with meals., Disp: 360 tablet, Rfl: 3    metoprolol succinate (TOPROL-XL) 25 MG 24 hr tablet, Take 1 tablet (25 mg total) by mouth once daily., Disp: 90 tablet, Rfl: 3    multivitamin (THERAGRAN) tablet, Take 1 tablet by mouth once daily., Disp: , Rfl:     omega-3 acid ethyl esters (LOVAZA) 1 gram capsule, Take 2 g by mouth 2 (two) times daily., Disp: , Rfl:     pantoprazole (PROTONIX) 40 MG tablet, Take 1 tablet (40 mg total) by mouth once daily., Disp: 90 tablet, Rfl: 3    rOPINIRole (REQUIP) 1 MG tablet, TAKE 1 TABLET(1 MG) BY MOUTH EVERY EVENING, Disp: 90 tablet, Rfl: 3    rosuvastatin (CRESTOR) 20 MG tablet, TAKE 1 TABLET(20 MG) BY MOUTH EVERY DAY, Disp: 90 tablet, Rfl: 3    sertraline (ZOLOFT) 50 MG tablet, Take 1 tablet (50 mg total) by mouth once daily., Disp: 30 tablet, Rfl: 11    sulfamethoxazole-trimethoprim 800-160mg (BACTRIM DS) 800-160 mg Tab, Take 1 tablet by mouth 2 (two) times daily. for 7 days, Disp: 14 tablet, Rfl: 0    tamsulosin (FLOMAX) 0.4 mg Cap, TAKE 1 CAPSULE(0.4 MG) BY MOUTH EVERY DAY (Patient taking differently: Take 0.4 mg by mouth every evening.), Disp: 90 capsule, Rfl: 3    VITAMIN B-1 100 MG tablet, TAKE 1 TABLET BY MOUTH ONCE DAILY, Disp: 30 tablet, Rfl: 2    [START ON 5/13/2024] amiodarone (PACERONE) 200 MG Tab, Take 1 tablet (200 mg total) by mouth once daily. Begin taking AFTER completing 2 week loading dose., Disp: 30 tablet, Rfl: 11    amiodarone (PACERONE) 400 MG tablet, Take 1 tablet (400 mg total) by mouth 2 (two) times daily. for 14 days, Disp: 28 tablet, Rfl: 0    folic acid (FOLVITE) 1 MG tablet, Take 1 tablet (1 mg total) by mouth once daily., Disp: 30 tablet, Rfl: 11    gabapentin (NEURONTIN) 300 MG capsule, Take 1 capsule (300 mg total) by mouth 2 (two) times daily., Disp: 60 capsule,  "Rfl: 1    HYDROcodone-acetaminophen (NORCO) 5-325 mg per tablet, Take 1 tablet by mouth every 12 (twelve) hours as needed for Pain. (Patient not taking: Reported on 4/29/2024), Disp: 30 tablet, Rfl: 0     Allergies:  No known drug allergies     Medical History:   has a past medical history of A-fib, Alcohol abuse, Anxiety, Arthritis, Chronic gout, Diabetes mellitus, DM (diabetes mellitus) (11/16/2016), Fatty liver, Hyperlipidemia, Renal cell cancer (2014), and S/P TKR (total knee replacement), right (06/27/2019).    Surgical History:   has a past surgical history that includes Abscess drainage; Hand surgery (Left); Partial nephrectomy (Left, August 2014); Colonoscopy; Kidney surgery; Percutaneous cryotherapy of peripheral nerve using liquid nitrous oxide in closed needle device (Right, 6/17/2019); Total knee arthroplasty (Right, 6/27/2019); and Joint replacement.    Family History:  family history includes Alcohol abuse in his brother; Alzheimer's disease in his brother and mother; Cancer in his father and sister; Colon cancer in his father; Colon polyps in his brother; Diabetes in his mother; Lung cancer in his father and sister.    Social History:   reports that he has never smoked. He has never used smokeless tobacco. He reports current alcohol use of about 168.0 standard drinks of alcohol per week. He reports that he does not use drugs.    Physical Exam:  Vitals:    04/29/24 1047 04/29/24 1054   BP: (!) (P) 104/56    Pulse: (P) 63    Weight: (P) 82.2 kg (181 lb 3.5 oz)    Height: (P) 5' 6" (1.676 m)    PainSc: 10-Worst pain ever 10-Worst pain ever   PainLoc: (P) Groin        GENERAL: Well appearing, in no acute distress, alert and oriented x3.  PSYCH:  Mood and affect appropriate.  SKIN: Skin color, texture, turgor normal, no rashes or lesions.  HEAD/FACE:  Normocephalic, atraumatic. Cranial nerves grossly intact.  NECK: Normal ROM. Supple.   CV: RRR with palpation of the radial artery.  PULM: No evidence of " respiratory difficulty, symmetric chest rise.  GI:  Soft and non-distended.  : Testicular exam deferred.  Pt localized pain to the right testicle.   MSK: No obvious deformities, edema, or skin discoloration.  No atrophy or tone abnormalities are noted.   NEURO: Bilateral upper and lower extremity coordination and strength is symmetric.  No loss of sensation is noted.  MENTAL STATUS: A x O x 3, good concentration, speech is fluent and goal directed  MOTOR: 5/5 in all muscle groups  GAIT: Normal. Ambulates unassisted.    Imaging:  CT ABDOMEN PELVIS WITH IV CONTRAST     CLINICAL HISTORY:  Abdominal pain, acute, nonlocalized;vomiting.;     TECHNIQUE:  Low dose axial images, sagittal and coronal reformations were obtained from the lung bases to the pubic symphysis following the IV administration of 100 mL of Omnipaque 350 .  Oral contrast was not given.     COMPARISON:  Ultrasound 03/28/2024, CT abdomen and pelvis 02/28/2019     FINDINGS:  images of the lower thorax are unremarkable.     The liver is hypoattenuating suggesting steatosis, correlation with LFTs recommended. The spleen, pancreas, adrenal glands and gallbladder are unremarkable. The portal vein, splenic vein, SMV, celiac axis and SMA all are patent. The stomach is decompressed without wall thickening. There is wall thickening and mild inflammation involving the 1st portion of the duodenum, concerning for duodenitis. No obstruction. There are a few scattered prominent adjacent cornlel hepatic lymph nodes, suggesting reactive change.     The kidneys enhance symmetrically without nephrolithiasis. There is mild prominence of the bilateral renal collecting systems, right greater than left. Low attenuating lesions arise from the kidneys, too small for characterization, please see ultrasound 03/28/2024. There is mild bilateral perinephric fat stranding.  Bilateral ureters are mildly prominent, no calculi seen along their course. The urinary bladder is distended  without wall thickening. The prostate is enlarged. There are bilateral fat containing inguinal hernias.     The distal large bowel is decompressed.  The terminal ileum is unremarkable. The appendix is not confidently identified, no pericecal inflammation.  The small bowel, aside from the duodenum, is unremarkable.  There are a few scattered prominent mesenteric lymph nodes. There are a few scattered shotty periaortic and paracaval lymph nodes. There is atherosclerotic calcification of the aorta and its branches.     There is osteopenia. There are degenerative changes of the bilateral sacroiliac joints as well as of the spine.  No significant inguinal lymphadenopathy.     Impression:     This report was flagged in Epic as abnormal.     1. Wall thickening and inflammation involving the 1st portion of the duodenum concerning for duodenitis.  Correlation is advised.  Direct visualization as warranted.  2. The urinary bladder is distended noting prostatomegaly.  This is likely on the basis of urinary retention or outlet obstruction however correlation with urinalysis recommended to exclude superimposed infection.  This likely accounts for prominence of the bilateral renal collecting systems.  3. Hepatomegaly, correlation with LFTs recommended.  4. Please see above for several additional findings.        Electronically signed by:Vinny Mcneal MD  Date:                                            04/01/2024      US SCROTUM AND TESTICLES WITH DOPPLER (XPD)    CLINICAL HISTORY:  rt testicular pain; Right testicular pain     TECHNIQUE:  Sonography of the scrotum and testes.     COMPARISON:  None.     FINDINGS:  Right Testicle:     *Size: 4.0 x 1.9 x 2.7 cm  *Appearance: Normal.  *Flow: Normal arterial and venous flow  *Epididymis: Epididymal head cyst measuring 0.3 x 0.2 x 0.3 cm.  *Hydrocele: None.  *Varicocele: None.  .     Left Testicle:     *Size: 3.5 x 1.6 x 2.4 cm  *Appearance: Normal.  *Flow: Normal arterial and  venous flow  *Epididymis: Hyperechoic focus in the left epididymal head measuring 0.2 x 0.2 x 0.2 cm, likely a complicated epididymal head cyst.  *Hydrocele: None.  *Varicocele: Present.  .     Other findings: None.     Impression:     No acute sonographic abnormality.     Left-sided varicocele.     Bilateral epididymal head cysts.        Electronically signed by:Archie Tapia MD  Date:                                            04/01/2024      Labs:  BMP  Lab Results   Component Value Date     (L) 04/22/2024    K 3.7 04/22/2024     04/22/2024    CO2 20 (L) 04/22/2024    BUN 8 04/22/2024    CREATININE 1.5 (H) 04/22/2024    CALCIUM 9.1 04/22/2024    ANIONGAP 14 04/22/2024    EGFRNORACEVR 50.1 (A) 04/22/2024     Lab Results   Component Value Date    ALT 79 (H) 04/22/2024     (H) 04/22/2024     (H) 05/06/2021    ALKPHOS 84 04/22/2024    BILITOT 2.1 (H) 04/22/2024     Lab Results   Component Value Date    WBC 7.48 04/22/2024    HGB 15.6 04/22/2024    HCT 46.9 04/22/2024    MCV 91 04/22/2024     04/22/2024         Assessment:  Problem List Items Addressed This Visit          Renal/    Testicular pain, right - Primary     Other Visit Diagnoses       Right inguinal pain                04/29/2024 - Donald Warren Abadie is a 68 y.o. male who  has a past medical history of A-fib, Alcohol abuse, Anxiety, Arthritis, Chronic gout, Diabetes mellitus, DM (diabetes mellitus) (11/16/2016), Fatty liver, Hyperlipidemia, Renal cell cancer (2014), and S/P TKR (total knee replacement), right (06/27/2019).  By history and examination this patient has right testicular pain x 4 weeks. He was noted to have a right epididymal head cyst on ultrasound.  He was being treated for a UTI currently.  He was referred to us by Urology.  We discussed the underlying diagnoses and multiple treatment options including non-opioid medications and interventional procedures.  He may benefit from a right ilioinguinal nerve  block under ultrasound guidance.  Patient would need to complete current antibiotic course and weight 14 days from last dose of antibiotics prior to performing this procedure.  In the meantime, I will get the patient started on gabapentin 300 mg b.i.d..  The risks and benefits of each treatment option were discussed and all questions were answered.        Treatment Plan:   Procedures:  Schedule patient for right ilioinguinal nerve block under ultrasound guidance.  This will be scheduled 14 days from last dose of antibiotics as he is currently being treated for UTI.  PT/OT/HEP: I have stressed the importance of physical activity and a home exercise plan to help with pain and improve health.  Urology recommending supportive underwear, warm compresses, and scrotal elevation.  Medications:    - gabapentin 300 mg b.i.d..  Renal dosing.  Last GFR 50.    - I strongly cautioned the patient against the concurrent use of opioids and benzodiazepines. Combining opioids and benzodiazepines can increase risk of overdose as both drugs can cause sedation and suppress breathing, in addition to impairing cognitive functions.    -  Reviewed and consistent with medication use as prescribed.  Imaging: Ultrasound imaging results reviewed.   Follow Up: RTC 5/17/2024 for procedure visit     Nancy Bahena DO   Interventional Pain Medicine / Anesthesiology    Disclaimer: This note was partly generated using dictation software which may occasionally result in transcription errors.

## 2024-04-29 NOTE — TELEPHONE ENCOUNTER
----- Message from Arelis Larkin sent at 4/29/2024 11:58 AM CDT -----  Contact: [phone]  1MEDICALADVICE     Patient is calling for Medical Advice regarding: pt would like a call back about getting anew rx please advise     How long has patient had these symptoms:    Pharmacy name and phone#:    Would like response via [facility]: call back     Comments:

## 2024-04-29 NOTE — TELEPHONE ENCOUNTER
Returned patient call he states that he is in pain and the pain management doctor told him to ask PCP  for hydrocodone because they can only give him so many pills.

## 2024-04-29 NOTE — TELEPHONE ENCOUNTER
Spoke with patient he was calling for some pain medication but he thought he was contacting Pain Management.

## 2024-04-30 ENCOUNTER — OFFICE VISIT (OUTPATIENT)
Dept: ELECTROPHYSIOLOGY | Facility: CLINIC | Age: 69
End: 2024-04-30
Payer: MEDICARE

## 2024-04-30 ENCOUNTER — HOSPITAL ENCOUNTER (OUTPATIENT)
Dept: CARDIOLOGY | Facility: CLINIC | Age: 69
Discharge: HOME | End: 2024-04-30
Payer: MEDICARE

## 2024-04-30 VITALS
WEIGHT: 180 LBS | BODY MASS INDEX: 28.93 KG/M2 | SYSTOLIC BLOOD PRESSURE: 120 MMHG | HEIGHT: 66 IN | DIASTOLIC BLOOD PRESSURE: 68 MMHG | HEART RATE: 63 BPM

## 2024-04-30 DIAGNOSIS — I48.91 ATRIAL FIBRILLATION WITH RVR: ICD-10-CM

## 2024-04-30 DIAGNOSIS — F10.20 ALCOHOL USE DISORDER, MODERATE, DEPENDENCE: ICD-10-CM

## 2024-04-30 DIAGNOSIS — I70.0 AORTIC ATHEROSCLEROSIS: ICD-10-CM

## 2024-04-30 DIAGNOSIS — I48.0 PAROXYSMAL ATRIAL FIBRILLATION: Primary | ICD-10-CM

## 2024-04-30 DIAGNOSIS — I48.3 TYPICAL ATRIAL FLUTTER: ICD-10-CM

## 2024-04-30 DIAGNOSIS — E11.9 TYPE 2 DIABETES MELLITUS WITHOUT COMPLICATION, WITHOUT LONG-TERM CURRENT USE OF INSULIN: ICD-10-CM

## 2024-04-30 LAB
OHS QRS DURATION: 86 MS
OHS QTC CALCULATION: 435 MS

## 2024-04-30 PROCEDURE — 93005 ELECTROCARDIOGRAM TRACING: CPT | Mod: S$GLB,,, | Performed by: INTERNAL MEDICINE

## 2024-04-30 PROCEDURE — 1126F AMNT PAIN NOTED NONE PRSNT: CPT | Mod: HCNC,CPTII,S$GLB, | Performed by: INTERNAL MEDICINE

## 2024-04-30 PROCEDURE — 3008F BODY MASS INDEX DOCD: CPT | Mod: HCNC,CPTII,S$GLB, | Performed by: INTERNAL MEDICINE

## 2024-04-30 PROCEDURE — 1101F PT FALLS ASSESS-DOCD LE1/YR: CPT | Mod: HCNC,CPTII,S$GLB, | Performed by: INTERNAL MEDICINE

## 2024-04-30 PROCEDURE — 1159F MED LIST DOCD IN RCRD: CPT | Mod: HCNC,CPTII,S$GLB, | Performed by: INTERNAL MEDICINE

## 2024-04-30 PROCEDURE — 99215 OFFICE O/P EST HI 40 MIN: CPT | Mod: HCNC,S$GLB,, | Performed by: INTERNAL MEDICINE

## 2024-04-30 PROCEDURE — 99999 PR PBB SHADOW E&M-EST. PATIENT-LVL IV: CPT | Mod: PBBFAC,,, | Performed by: INTERNAL MEDICINE

## 2024-04-30 PROCEDURE — 3074F SYST BP LT 130 MM HG: CPT | Mod: HCNC,CPTII,S$GLB, | Performed by: INTERNAL MEDICINE

## 2024-04-30 PROCEDURE — 3288F FALL RISK ASSESSMENT DOCD: CPT | Mod: HCNC,CPTII,S$GLB, | Performed by: INTERNAL MEDICINE

## 2024-04-30 PROCEDURE — 1160F RVW MEDS BY RX/DR IN RCRD: CPT | Mod: HCNC,CPTII,S$GLB, | Performed by: INTERNAL MEDICINE

## 2024-04-30 PROCEDURE — 3078F DIAST BP <80 MM HG: CPT | Mod: HCNC,CPTII,S$GLB, | Performed by: INTERNAL MEDICINE

## 2024-04-30 PROCEDURE — 3044F HG A1C LEVEL LT 7.0%: CPT | Mod: HCNC,CPTII,S$GLB, | Performed by: INTERNAL MEDICINE

## 2024-04-30 PROCEDURE — 93010 ELECTROCARDIOGRAM REPORT: CPT | Mod: S$GLB,,, | Performed by: INTERNAL MEDICINE

## 2024-04-30 NOTE — PROGRESS NOTES
Subjective   Patient ID:  Donald Warren Abadie is a 68 y.o. male who presents for follow-up of Atrial Fibrillation    Primary Cardiologist: Uriel Tolentino MD  Primary Care Physician: Bacilio Larry MD    HPI  I had the pleasure of seeing Mr. Abadie in our electrophysiology clinic in follow-up for his atrial fibrillation. As you are aware he is a 68 year-old man, former patient of Dr. Giang, with paroxysmal atrial fibrillation/flutter, alcohol dependence, and mildly reduce LVEF (45-50% in 2022) who follows up for AF. He was previously taking prn flecainide with good control of his symptoms however he presented in 10/2023 with AF with RVR in setting of alcohol relapse. He spontaneously converted. Plan was to start multaq 3 days later and follow-up in clinic. PVI had been offered in the past. Multaq never filled due to cost. Recently has had ER visits for symptomatic pAF with RVR. Amiodarone started.    My interpretation of today's in-clinic ECG is sinus rhythm with a narrow QRS      Review of Systems   Constitutional: Negative for fever and malaise/fatigue.   HENT:  Negative for congestion and sore throat.    Eyes:  Negative for blurred vision and visual disturbance.   Cardiovascular:  Positive for irregular heartbeat and palpitations. Negative for chest pain, dyspnea on exertion, near-syncope and syncope.   Respiratory:  Negative for cough and shortness of breath.    Hematologic/Lymphatic: Negative for bleeding problem. Does not bruise/bleed easily.   Skin: Negative.    Musculoskeletal: Negative.    Gastrointestinal:  Negative for bloating, abdominal pain, hematochezia and melena.   Neurological:  Negative for focal weakness and weakness.   Psychiatric/Behavioral: Negative.            Objective     Physical Exam  Vitals reviewed.   Constitutional:       General: He is not in acute distress.     Appearance: He is well-developed. He is not diaphoretic.   HENT:      Head: Normocephalic and atraumatic.   Eyes:       General:         Right eye: No discharge.         Left eye: No discharge.      Conjunctiva/sclera: Conjunctivae normal.   Cardiovascular:      Rate and Rhythm: Normal rate and regular rhythm.      Heart sounds: No murmur heard.     No friction rub. No gallop.   Pulmonary:      Effort: Pulmonary effort is normal. No respiratory distress.      Breath sounds: Normal breath sounds. No wheezing or rales.   Abdominal:      General: Bowel sounds are normal. There is no distension.      Palpations: Abdomen is soft.      Tenderness: There is no abdominal tenderness.   Musculoskeletal:      Cervical back: Neck supple.   Skin:     General: Skin is warm and dry.   Neurological:      Mental Status: He is alert and oriented to person, place, and time.   Psychiatric:         Behavior: Behavior normal.         Thought Content: Thought content normal.         Judgment: Judgment normal.            Assessment and Plan     1. Paroxysmal atrial fibrillation    2. Aortic atherosclerosis    3. Typical atrial flutter    4. Alcohol use disorder, moderate, dependence    5. Type 2 diabetes mellitus without complication, without long-term current use of insulin    6. Atrial fibrillation with RVR        Plan:  In summary, Mr. Abadie is a 68 year-old man, former patient of Dr. Giang, with paroxysmal atrial fibrillation/flutter, alcohol dependence, and mildly reduce LVEF (45-50% in 2022) who follows up for AF. Discussed PVI as an alternative to drug therapy. I spent about a half hour discussing the nature of PVI including transseptal puncture. We discussed risks and benefits at length. Our discussion included, but was not limited to the risk of death, infection, bleeding, stroke, MI, cardiac perforation, embolism, cardiac tamponade, vascular injury, valvular injury, AE fistula, injury to phrenic nerve, pulmonary vein stenosis and other organic injury including the possibility for need for surgery or pacemaker implantation.  Discussed long-term  issues with amiodarone. Sotalol would be an alternative choice at some point.    He wants to think about the ablation.    RTC 3 months      Thank you for allowing me to participate in the care of this patient. Please do not hesitate to call me with any questions or concerns.    Trae Angel MD, PhD  Cardiac Electrophysiology

## 2024-05-01 ENCOUNTER — TELEPHONE (OUTPATIENT)
Dept: INTERNAL MEDICINE | Facility: CLINIC | Age: 69
End: 2024-05-01
Payer: MEDICARE

## 2024-05-01 ENCOUNTER — TELEPHONE (OUTPATIENT)
Dept: UROLOGY | Facility: CLINIC | Age: 69
End: 2024-05-01
Payer: MEDICARE

## 2024-05-01 NOTE — TELEPHONE ENCOUNTER
Spoke with pt, he called 04/29/2024 and msg by Kristine was forwarded to you. Pt is asking for you to call him since his Pain Mgmt Physician referred him back to you for Norco. Pt requesting you to call him and fill his Norco. Please refer to Lacie or Fany if you can not contact pt since he does not want to speak with anyone but you.      Pt ws informed of your original msg from 04/09/2024 about refused Xanax and Norco due to drinking.

## 2024-05-01 NOTE — TELEPHONE ENCOUNTER
----- Message from Magda Hunt sent at 5/1/2024  8:58 AM CDT -----  Contact: [phone]  1MEDICALADVICE     Patient is calling for Medical Advice regarding:speak to the office     How long has patient had these symptoms:    Pharmacy name and phone#:    Would like response via [employer]:  no     Comments:  Pt is asking for a return call in regards to some medication no other info given please advise

## 2024-05-01 NOTE — TELEPHONE ENCOUNTER
Per Dr. Larry,    I will not be prescribing any hydrocodone or ativan for this patient. He is active,y drinking alcohol much more than what he is telling me. I can see this in his blood tests . The alcohol also worsens his A-fib. You can have the nurse relay this message.

## 2024-05-01 NOTE — TELEPHONE ENCOUNTER
----- Message from Caroline Daly sent at 5/1/2024 10:59 AM CDT -----  Needs advice from nurse:      Who Called:pt  Regarding:needs to discuss medication  Would the patient rather a call back or [facility]IA [facility]?  Best Call Back number: [phone]  Additional Info:

## 2024-05-01 NOTE — TELEPHONE ENCOUNTER
Spoke with patient he wanted the office to call Dr. Larry office to tell him to prescribe him some pain medication. I informed patient the NP put an referral in for pain management,he reply pain management told him to reach out to his PCP, which he did call, I advise him to wait for an response for the doctor office. Patient voice his understanding.

## 2024-05-02 ENCOUNTER — HOSPITAL ENCOUNTER (EMERGENCY)
Facility: HOSPITAL | Age: 69
Discharge: HOME | End: 2024-05-02
Attending: STUDENT IN AN ORGANIZED HEALTH CARE EDUCATION/TRAINING PROGRAM
Payer: MEDICARE

## 2024-05-02 VITALS
RESPIRATION RATE: 16 BRPM | HEART RATE: 55 BPM | TEMPERATURE: 98 F | BODY MASS INDEX: 28.25 KG/M2 | WEIGHT: 175 LBS | DIASTOLIC BLOOD PRESSURE: 81 MMHG | SYSTOLIC BLOOD PRESSURE: 142 MMHG | OXYGEN SATURATION: 100 %

## 2024-05-02 DIAGNOSIS — R10.31 RIGHT LOWER QUADRANT ABDOMINAL PAIN: ICD-10-CM

## 2024-05-02 DIAGNOSIS — N50.819 TESTICULAR PAIN, UNSPECIFIED: ICD-10-CM

## 2024-05-02 DIAGNOSIS — R10.9 RIGHT FLANK PAIN: Primary | ICD-10-CM

## 2024-05-02 LAB
ALBUMIN SERPL BCP-MCNC: 4 G/DL (ref 3.5–5.2)
ALP SERPL-CCNC: 62 U/L (ref 55–135)
ALT SERPL W/O P-5'-P-CCNC: 39 U/L (ref 10–44)
ANION GAP SERPL CALC-SCNC: 9 MMOL/L (ref 8–16)
AST SERPL-CCNC: 67 U/L (ref 10–40)
BACTERIA #/AREA URNS AUTO: ABNORMAL /HPF
BASOPHILS # BLD AUTO: 0.08 K/UL (ref 0–0.2)
BASOPHILS NFR BLD: 1.4 % (ref 0–1.9)
BILIRUB SERPL-MCNC: 0.5 MG/DL (ref 0.1–1)
BILIRUB UR QL STRIP: NEGATIVE
BUN SERPL-MCNC: 10 MG/DL (ref 8–23)
BUN SERPL-MCNC: 11 MG/DL (ref 6–30)
CALCIUM SERPL-MCNC: 9.1 MG/DL (ref 8.7–10.5)
CHLORIDE SERPL-SCNC: 97 MMOL/L (ref 95–110)
CHLORIDE SERPL-SCNC: 98 MMOL/L (ref 95–110)
CLARITY UR REFRACT.AUTO: CLEAR
CO2 SERPL-SCNC: 22 MMOL/L (ref 23–29)
COLOR UR AUTO: YELLOW
CREAT SERPL-MCNC: 1.1 MG/DL (ref 0.5–1.4)
CREAT SERPL-MCNC: 1.2 MG/DL (ref 0.5–1.4)
DIFFERENTIAL METHOD BLD: ABNORMAL
EOSINOPHIL # BLD AUTO: 0.2 K/UL (ref 0–0.5)
EOSINOPHIL NFR BLD: 2.7 % (ref 0–8)
ERYTHROCYTE [DISTWIDTH] IN BLOOD BY AUTOMATED COUNT: 17.1 % (ref 11.5–14.5)
EST. GFR  (NO RACE VARIABLE): >60 ML/MIN/1.73 M^2
GLUCOSE SERPL-MCNC: 94 MG/DL (ref 70–110)
GLUCOSE SERPL-MCNC: 96 MG/DL (ref 70–110)
GLUCOSE UR QL STRIP: NEGATIVE
HCT VFR BLD AUTO: 41.3 % (ref 40–54)
HCT VFR BLD CALC: 46 %PCV (ref 36–54)
HGB BLD-MCNC: 13.7 G/DL (ref 14–18)
HGB UR QL STRIP: NEGATIVE
IMM GRANULOCYTES # BLD AUTO: 0.02 K/UL (ref 0–0.04)
IMM GRANULOCYTES NFR BLD AUTO: 0.4 % (ref 0–0.5)
KETONES UR QL STRIP: NEGATIVE
LEUKOCYTE ESTERASE UR QL STRIP: ABNORMAL
LYMPHOCYTES # BLD AUTO: 1.7 K/UL (ref 1–4.8)
LYMPHOCYTES NFR BLD: 30.3 % (ref 18–48)
MCH RBC QN AUTO: 30.6 PG (ref 27–31)
MCHC RBC AUTO-ENTMCNC: 33.2 G/DL (ref 32–36)
MCV RBC AUTO: 92 FL (ref 82–98)
MICROSCOPIC COMMENT: ABNORMAL
MONOCYTES # BLD AUTO: 0.7 K/UL (ref 0.3–1)
MONOCYTES NFR BLD: 12.8 % (ref 4–15)
NEUTROPHILS # BLD AUTO: 2.9 K/UL (ref 1.8–7.7)
NEUTROPHILS NFR BLD: 52.4 % (ref 38–73)
NITRITE UR QL STRIP: NEGATIVE
NRBC BLD-RTO: 0 /100 WBC
PH UR STRIP: 6 [PH] (ref 5–8)
PLATELET # BLD AUTO: 163 K/UL (ref 150–450)
PMV BLD AUTO: 9.4 FL (ref 9.2–12.9)
POC IONIZED CALCIUM: 1.09 MMOL/L (ref 1.06–1.42)
POC TCO2 (MEASURED): 27 MMOL/L (ref 23–29)
POTASSIUM BLD-SCNC: 6.3 MMOL/L (ref 3.5–5.1)
POTASSIUM SERPL-SCNC: 5 MMOL/L (ref 3.5–5.1)
PROT SERPL-MCNC: 7.4 G/DL (ref 6–8.4)
PROT UR QL STRIP: NEGATIVE
RBC # BLD AUTO: 4.48 M/UL (ref 4.6–6.2)
RBC #/AREA URNS AUTO: 1 /HPF (ref 0–4)
SAMPLE: ABNORMAL
SODIUM BLD-SCNC: 132 MMOL/L (ref 136–145)
SODIUM SERPL-SCNC: 129 MMOL/L (ref 136–145)
SP GR UR STRIP: 1 (ref 1–1.03)
URN SPEC COLLECT METH UR: ABNORMAL
WBC # BLD AUTO: 5.54 K/UL (ref 3.9–12.7)
WBC #/AREA URNS AUTO: 7 /HPF (ref 0–5)

## 2024-05-02 PROCEDURE — 96361 HYDRATE IV INFUSION ADD-ON: CPT | Mod: HCNC

## 2024-05-02 PROCEDURE — 80053 COMPREHEN METABOLIC PANEL: CPT | Mod: HCNC

## 2024-05-02 PROCEDURE — 25000003 PHARM REV CODE 250: Mod: HCNC

## 2024-05-02 PROCEDURE — 81001 URINALYSIS AUTO W/SCOPE: CPT | Mod: HCNC | Performed by: STUDENT IN AN ORGANIZED HEALTH CARE EDUCATION/TRAINING PROGRAM

## 2024-05-02 PROCEDURE — 85025 COMPLETE CBC W/AUTO DIFF WBC: CPT | Mod: HCNC

## 2024-05-02 PROCEDURE — 25500020 PHARM REV CODE 255: Mod: HCNC | Performed by: STUDENT IN AN ORGANIZED HEALTH CARE EDUCATION/TRAINING PROGRAM

## 2024-05-02 PROCEDURE — 99285 EMERGENCY DEPT VISIT HI MDM: CPT | Mod: 25,HCNC

## 2024-05-02 PROCEDURE — 96360 HYDRATION IV INFUSION INIT: CPT | Mod: HCNC,59

## 2024-05-02 PROCEDURE — 80048 BASIC METABOLIC PNL TOTAL CA: CPT | Mod: HCNC,XB

## 2024-05-02 RX ORDER — HYDROCODONE BITARTRATE AND ACETAMINOPHEN 5; 325 MG/1; MG/1
1 TABLET ORAL EVERY 6 HOURS PRN
Status: DISCONTINUED | OUTPATIENT
Start: 2024-05-02 | End: 2024-05-02 | Stop reason: HOSPADM

## 2024-05-02 RX ORDER — LIDOCAINE 50 MG/G
1 PATCH TOPICAL
Status: DISCONTINUED | OUTPATIENT
Start: 2024-05-02 | End: 2024-05-02 | Stop reason: HOSPADM

## 2024-05-02 RX ADMIN — IOHEXOL 100 ML: 350 INJECTION, SOLUTION INTRAVENOUS at 03:05

## 2024-05-02 RX ADMIN — HYDROCODONE BITARTRATE AND ACETAMINOPHEN 1 TABLET: 5; 325 TABLET ORAL at 01:05

## 2024-05-02 RX ADMIN — LIDOCAINE 1 PATCH: 50 PATCH CUTANEOUS at 12:05

## 2024-05-02 RX ADMIN — SODIUM CHLORIDE 500 ML: 9 INJECTION, SOLUTION INTRAVENOUS at 12:05

## 2024-05-02 NOTE — DISCHARGE INSTRUCTIONS
You had an incidental finding of a small growth on your pancreas.  It is important that you follow up with your primary care doctor to have this monitored.  You may return to the emergency department if you have any new onset or worsening symptoms.

## 2024-05-02 NOTE — ED NOTES
I-STAT Chem-8+ Results:   Value Reference Range   Sodium 132 136-145 mmol/L   Potassium  6.3 3.5-5.1 mmol/L   Chloride 97  mmol/L   Ionized Calcium 1.09 1.06-1.42 mmol/L   CO2 (measured) 27 23-29 mmol/L   Glucose 96  mg/dL   BUN 11 6-30 mg/dL   Creatinine 1.1 0.5-1.4 mg/dL   Hematocrit 46 36-54%

## 2024-05-02 NOTE — ED TRIAGE NOTES
"Pt presents to the ED complaining of R flank pain. Pt states he was attempting to get out of his bed last night and "felt something tear". Pt states he cannot bend over or move without being in pain. Pt is currently in 10/10 pain.   "

## 2024-05-02 NOTE — ED PROVIDER NOTES
"Encounter Date: 5/2/2024       History     Chief Complaint   Patient presents with    Back Pain     Right side back/flank pain      68-year-old male with history of paroxysmal atrial fibrillation, right epididymal head cysts, RCC, alcohol use disorder, type 2 DM, HLD, chronic gout, and arthritis who presents to the ED for chief complaint of right flank pain.  Patient denies any back pain as stated in the triage chief complaint.  Patient states he was trying to get out of bed earlier today, pulled himself up, and "felt like something tore".  Patient states that he feels like his pain worsens when he tries to bend over and when he twists his body.  He currently endorses 10/10 in pain severity.  He also states that he has some pain that radiates to his right inguinal area.  He notes that had prior hernias in the past.  He denies any fevers, chest pain, SOB, nausea, vomiting, or changes in urination.  He denied any falls or recent trauma.    The history is provided by the patient. No  was used.     Review of patient's allergies indicates:   Allergen Reactions    No known drug allergies      Past Medical History:   Diagnosis Date    A-fib     Alcohol abuse     Anxiety     Arthritis     Chronic gout     Diabetes mellitus     DM (diabetes mellitus) 11/16/2016    "boarderline, not taking any meds currently"    Fatty liver     Hyperlipidemia     Renal cell cancer 2014    S/P TKR (total knee replacement), right 06/27/2019     Past Surgical History:   Procedure Laterality Date    ABSCESS DRAINAGE      perirectal    COLONOSCOPY      HAND SURGERY Left     JOINT REPLACEMENT      knee replacement right knee    KIDNEY SURGERY      partial left kidney removal - [place]    PARTIAL NEPHRECTOMY Left August 2014    PERCUTANEOUS CRYOTHERAPY OF PERIPHERAL NERVE USING LIQUID NITROUS OXIDE IN CLOSED NEEDLE DEVICE Right 6/17/2019    Procedure: CRYOTHERAPY, NERVE, PERIPHERAL, PERCUTANEOUS, USING LIQUID NITROUS OXIDE IN CLOSED " NEEDLE DEVICE-right knee iovera;  Surgeon: Donny Hair III, MD;  Location: [facility] CATH LAB;  Service: Pain Management;  Laterality: Right;    TOTAL KNEE ARTHROPLASTY Right 6/27/2019    Procedure: ARTHROPLASTY, KNEE, TOTAL-SAME DAY;  Surgeon: Mikael Huerta MD;  [facility];  Service: Orthopedics;  Laterality: Right;     Family History   Problem Relation Name Age of Onset    Lung cancer Father      Colon cancer Father      Cancer Father          lung cancer    Diabetes Mother      Alzheimer's disease Mother      Lung cancer Sister      Cancer Sister          lung    Colon polyps Brother      Alcohol abuse Brother      Alzheimer's disease Brother      Cirrhosis Neg Hx       Social History     Tobacco Use    Smoking status: Never    Smokeless tobacco: Never   Substance Use Topics    Alcohol use: Yes     Alcohol/week: 168.0 standard drinks of alcohol     Types: 168 Cans of beer per week     Comment: 12-24  beers daily    Drug use: No     Review of Systems    Physical Exam     Initial Vitals [05/02/24 1020]   BP Pulse Resp Temp SpO2   (!) 101/54 (!) 59 18 97.9 °F (36.6 °C) 96 %      MAP       --         Physical Exam    Nursing note and vitals reviewed.  Constitutional: No distress.   Patient is sitting on bed in no apparent distress   HENT:   Head: Normocephalic.   Eyes: Conjunctivae and EOM are normal. No scleral icterus.   Neck:   Normal range of motion.  Cardiovascular:  Normal rate, regular rhythm and normal heart sounds.           Pulmonary/Chest: Breath sounds normal. No respiratory distress. He has no wheezes. He has no rhonchi. He has no rales.   Abdominal: Abdomen is soft. He exhibits no distension. There is abdominal tenderness (RLQ TTP).   No CVA tenderness There is no rebound and no guarding.   Genitourinary:    Penis normal.      Genitourinary Comments: No obvious penile or testicular edema.  No erythema.  Small right testicular mass that is TTP     Musculoskeletal:          General: Normal range of motion.      Cervical back: Normal range of motion.     Neurological: He is alert.   Skin: Skin is warm. Capillary refill takes less than 2 seconds.   Psychiatric: He has a normal mood and affect.         ED Course   Procedures  Labs Reviewed   URINALYSIS, REFLEX TO URINE CULTURE - Abnormal; Notable for the following components:       Result Value    Leukocytes, UA 1+ (*)     All other components within normal limits    Narrative:     Specimen Source->Urine   CBC W/ AUTO DIFFERENTIAL - Abnormal; Notable for the following components:    RBC 4.48 (*)     Hemoglobin 13.7 (*)     RDW 17.1 (*)     All other components within normal limits   COMPREHENSIVE METABOLIC PANEL - Abnormal; Notable for the following components:    Sodium 129 (*)     CO2 22 (*)     AST 67 (*)     All other components within normal limits   URINALYSIS MICROSCOPIC - Abnormal; Notable for the following components:    WBC, UA 7 (*)     All other components within normal limits    Narrative:     Specimen Source->Urine          Imaging Results              US Scrotum And Testicles (In process)                      Medications   LIDOcaine 5 % patch 1 patch (1 patch Transdermal Patch Applied 5/2/24 1204)   HYDROcodone-acetaminophen 5-325 mg per tablet 1 tablet (1 tablet Oral Given 5/2/24 1300)   sodium chloride 0.9% bolus 500 mL 500 mL (500 mLs Intravenous New Bag 5/2/24 1240)     Medical Decision Making  68-year-old male who presents with right flank pain and right groin pain.  His BP is soft, but may be appropriate for his alcohol use disorder history.  On exam, he has RLQ tenderness.  He has a small right testicular mass that is TTP, which is consistent with his epididymal head cyst.  No obvious penile or testicular erythema or edema.  Please see physical exam findings above for additional details.  Differential diagnoses include but are not limited to appendicitis, cholecystitis, inguinal hernia, UTI, renal cysts, malignancy,  MSK pain, testicular epididymal head cyst, torsion, hydrocele.  Moderate suspicion that patient's pain may be caused by a testicular cyst or hernia given his prior history.  We will also evaluate for other causes such as torsion or hydrocele.  He has had no trauma.  Will evaluate for appendicitis and other abdominal pathology given his RLQ tenderness.  Obtained labs and ordered CT abdomen pelvis and ultrasound of the testes/scrotum.  Ordered Norco for pain and 500 mL of normal saline.    Discussed patient with change of shift ED team.  At time of sign-out, patient is pending CT abdomen pelvis and ultrasound of the testes/scrotum.  Disposition will depend on sodium re-evaluation and the results of his scans.    Amount and/or Complexity of Data Reviewed  Labs: ordered. Decision-making details documented in ED Course.  Radiology: ordered.    Risk  Prescription drug management.               ED Course as of 05/02/24 1441   Thu May 02, 2024   1316 Sodium(!): 129 [AC]   1316 Sodium(!): 129 [MD]   1340 Patient placed on telemetry for observation. [MD]      ED Course User Index  [AC] Ned Turk DO  [MD] Kobe Avilez MD                             Clinical Impression:  Final diagnoses:  [N50.819] Testicular pain, unspecified  [R10.9] Right flank pain (Primary)  [R10.31] Right lower quadrant abdominal pain                   Kobe Avilez MD  Resident  05/02/24 6018

## 2024-05-02 NOTE — PROVIDER PROGRESS NOTES - EMERGENCY DEPT.
Encounter Date: 5/2/2024    ED Physician Progress Notes          Physician Note:   Signout Note  I received signout from the previous providers.      Chief complaint:  Right flank pain     Per sign out and chart review:  Donald Warren Abadie is a 68 y.o. male , presenting with complaints of right flank pain that started earlier today.  Initially when he present to the ED was a 10/10.  This pain reads to the right inguinal region.     Pt signed out to me pending:  CT abdomen/scrotal ultrasound/repeat Chem 8     Update/ Disposition:  The patient's CT abdomen/scrotal ultrasound were both shown to be unremarkable for any pathology.  At his initial presentation he was noted to have a sodium of 129.  After receiving fluid boluses, subsequent Chem 8 showed his sodium was 132.  At this time, I feel the patient was safe for discharge     Patient, caregiver and/or family understands the plan and verbalized agreement. All questions answered.      Diagnostic Impression:    Right flank pain, hyponatremia

## 2024-05-03 ENCOUNTER — PATIENT OUTREACH (OUTPATIENT)
Dept: EMERGENCY MEDICINE | Facility: HOSPITAL | Age: 69
End: 2024-05-03
Payer: MEDICARE

## 2024-05-03 NOTE — PROGRESS NOTES
ED Navigator f/u with Pt regarding his recent ED visit. Pt states he was trying to get up out of the bed by pulling up on the headboard to use the bathroom and pulled something. He tried taking a shower before going to the ED and was unable to bend to scrub from the waist down. Upon arrival he was given something for pain that helped. He is currently taking Gabapentin for relief. ED navigator will follow-up with patient periodically.

## 2024-05-06 ENCOUNTER — OFFICE VISIT (OUTPATIENT)
Dept: INTERNAL MEDICINE | Facility: CLINIC | Age: 69
End: 2024-05-06
Payer: MEDICARE

## 2024-05-06 ENCOUNTER — NURSE TRIAGE (OUTPATIENT)
Dept: ADMINISTRATIVE | Facility: CLINIC | Age: 69
End: 2024-05-06
Payer: MEDICARE

## 2024-05-06 VITALS
SYSTOLIC BLOOD PRESSURE: 120 MMHG | HEIGHT: 66 IN | DIASTOLIC BLOOD PRESSURE: 60 MMHG | BODY MASS INDEX: 29.55 KG/M2 | WEIGHT: 183.88 LBS | HEART RATE: 69 BPM | OXYGEN SATURATION: 96 %

## 2024-05-06 DIAGNOSIS — N50.811 RIGHT TESTICULAR PAIN: ICD-10-CM

## 2024-05-06 DIAGNOSIS — R93.5 ABNORMAL CT OF THE ABDOMEN: Primary | ICD-10-CM

## 2024-05-06 DIAGNOSIS — R10.9 RIGHT SIDED ABDOMINAL PAIN: ICD-10-CM

## 2024-05-06 PROCEDURE — 99999 PR PBB SHADOW E&M-EST. PATIENT-LVL V: CPT | Mod: PBBFAC,HCNC,,

## 2024-05-06 PROCEDURE — 99214 OFFICE O/P EST MOD 30 MIN: CPT | Mod: HCNC,S$GLB,,

## 2024-05-06 PROCEDURE — 3078F DIAST BP <80 MM HG: CPT | Mod: HCNC,CPTII,S$GLB,

## 2024-05-06 PROCEDURE — 1101F PT FALLS ASSESS-DOCD LE1/YR: CPT | Mod: HCNC,CPTII,S$GLB,

## 2024-05-06 PROCEDURE — 3008F BODY MASS INDEX DOCD: CPT | Mod: HCNC,CPTII,S$GLB,

## 2024-05-06 PROCEDURE — 3288F FALL RISK ASSESSMENT DOCD: CPT | Mod: HCNC,CPTII,S$GLB,

## 2024-05-06 PROCEDURE — 3074F SYST BP LT 130 MM HG: CPT | Mod: HCNC,CPTII,S$GLB,

## 2024-05-06 PROCEDURE — 1159F MED LIST DOCD IN RCRD: CPT | Mod: HCNC,CPTII,S$GLB,

## 2024-05-06 PROCEDURE — 1160F RVW MEDS BY RX/DR IN RCRD: CPT | Mod: HCNC,CPTII,S$GLB,

## 2024-05-06 PROCEDURE — 3044F HG A1C LEVEL LT 7.0%: CPT | Mod: HCNC,CPTII,S$GLB,

## 2024-05-06 PROCEDURE — 1125F AMNT PAIN NOTED PAIN PRSNT: CPT | Mod: HCNC,CPTII,S$GLB,

## 2024-05-06 NOTE — PROGRESS NOTES
"INTERNAL MEDICINE PROGRESS/URGENT CARE NOTE    Patient: Donald Warren Abadie  : 1955  MRN: [id number]  PCP: Bacilio Larry MD    CHIEF COMPLAINT     Chief Complaint   Patient presents with    Testicle Pain       HPI     Kleber is a 68 y.o. male with T2DM, HLD, A fib, CHF, renal cell cancer, gout, depression, anxiety, substance use disorder who presents for a follow up visit today with c/o right testicular pain that has been going on for about 2-3 months. Recent right flank pain that was reported at recent ER visit. He states hydrocodone has helped with these pains.     F/w Urology (Dr. Goddard) and established care 3/28/24. Taking hydrocodone for R testicular pain with relief. US 24 showed right epididymal head cyst. Currently on Bactrim for suspected uti.    F/w Pain Mgmt (Dr. Bahena) and established care 24. Started on gabapentin. Considering ilioinguinal nerve block.    US scrotum and testicles 24  Impression:  No evidence of acute process, or torsion.  No testicular mass seen.  Bilateral varicoceles and bilateral hernias.  Bilateral incidental small epididymal cysts.    CT abdomen pelvis 24  Impression:  No acute abnormality identified to explain his right abdominal pain.  Vague 15 mm hypodensity somewhat heterogeneous in the pancreatic head certainly is more conspicuous when compared to 2019.  Further evaluation suggested when clinically appropriate. Incompletely visualized right middle lobe nodule series 2, image 1 uncertain significance.    He states he continues to drink 6-8 alcoholic beverages/day.    Past Medical History:  Past Medical History:   Diagnosis Date    A-fib     Alcohol abuse     Anxiety     Arthritis     Chronic gout     Diabetes mellitus     DM (diabetes mellitus) 2016    "boarderline, not taking any meds currently"    Fatty liver     Hyperlipidemia     Renal cell cancer 2014    S/P TKR (total knee replacement), right 2019        Past Surgical " History:  Past Surgical History:   Procedure Laterality Date    ABSCESS DRAINAGE      perirectal    COLONOSCOPY      HAND SURGERY Left     JOINT REPLACEMENT      knee replacement right knee    KIDNEY SURGERY      partial left kidney removal - [place]    PARTIAL NEPHRECTOMY Left August 2014    PERCUTANEOUS CRYOTHERAPY OF PERIPHERAL NERVE USING LIQUID NITROUS OXIDE IN CLOSED NEEDLE DEVICE Right 6/17/2019    Procedure: CRYOTHERAPY, NERVE, PERIPHERAL, PERCUTANEOUS, USING LIQUID NITROUS OXIDE IN CLOSED NEEDLE DEVICE-right knee iovera;  Surgeon: Donny Hair III, MD;  Location: [facility] CATH LAB;  Service: Pain Management;  Laterality: Right;    TOTAL KNEE ARTHROPLASTY Right 6/27/2019    Procedure: ARTHROPLASTY, KNEE, TOTAL-SAME DAY;  Surgeon: Mikael Huerta MD;  Location: [facility] OR [address] [place] [address];  Service: Orthopedics;  Laterality: Right;        Allergies:  Review of patient's allergies indicates:   Allergen Reactions    No known drug allergies        Home Medications:    Current Outpatient Medications:     allopurinoL (ZYLOPRIM) 100 MG tablet, TAKE 2 TABLETS(200 MG) BY MOUTH EVERY DAY, Disp: 180 tablet, Rfl: 0    ALPRAZolam (XANAX) 0.5 MG tablet, Take 1 tablet (0.5 mg total) by mouth nightly as needed for Anxiety., Disp: 30 tablet, Rfl: 0    [START ON 5/13/2024] amiodarone (PACERONE) 200 MG Tab, Take 1 tablet (200 mg total) by mouth once daily. Begin taking AFTER completing 2 week loading dose., Disp: 30 tablet, Rfl: 11    amiodarone (PACERONE) 400 MG tablet, Take 1 tablet (400 mg total) by mouth 2 (two) times daily. for 14 days, Disp: 28 tablet, Rfl: 0    cyanocobalamin (VITAMIN B-12) 1000 MCG tablet, Take 1 tablet (1,000 mcg total) by mouth once daily., Disp: , Rfl:     diclofenac sodium (VOLTAREN) 1 % Gel, Apply 2 g topically 3 (three) times daily as needed (Right knee - hand pain)., Disp: , Rfl:     ELIQUIS 5 mg Tab, TAKE 1 TABLET BY MOUTH TWICE DAILY (Patient taking differently: Take 5 mg by mouth 2 (two) times  daily.), Disp: 180 tablet, Rfl: 3    gabapentin (NEURONTIN) 300 MG capsule, Take 1 capsule (300 mg total) by mouth 2 (two) times daily., Disp: 60 capsule, Rfl: 1    HYDROcodone-acetaminophen (NORCO) 5-325 mg per tablet, Take 1 tablet by mouth every 12 (twelve) hours as needed for Pain., Disp: 30 tablet, Rfl: 0    ketoconazole (NIZORAL) 2 % cream, Apply topically once daily. Apply to affected skin from toes to heels twice a day for 3-4 weeks., Disp: 60 g, Rfl: 6    magnesium oxide (MAG-OX) 400 mg (241.3 mg magnesium) tablet, Take 1 tablet (400 mg total) by mouth once daily., Disp: 14 tablet, Rfl: 0    metFORMIN (GLUCOPHAGE-XR) 500 MG ER 24hr tablet, Take 2 tablets (1,000 mg total) by mouth 2 (two) times daily with meals., Disp: 360 tablet, Rfl: 3    metoprolol succinate (TOPROL-XL) 25 MG 24 hr tablet, Take 1 tablet (25 mg total) by mouth once daily., Disp: 90 tablet, Rfl: 3    multivitamin (THERAGRAN) tablet, Take 1 tablet by mouth once daily., Disp: , Rfl:     omega-3 acid ethyl esters (LOVAZA) 1 gram capsule, Take 2 g by mouth 2 (two) times daily., Disp: , Rfl:     pantoprazole (PROTONIX) 40 MG tablet, Take 1 tablet (40 mg total) by mouth once daily., Disp: 90 tablet, Rfl: 3    rOPINIRole (REQUIP) 1 MG tablet, TAKE 1 TABLET(1 MG) BY MOUTH EVERY EVENING, Disp: 90 tablet, Rfl: 3    rosuvastatin (CRESTOR) 20 MG tablet, TAKE 1 TABLET(20 MG) BY MOUTH EVERY DAY, Disp: 90 tablet, Rfl: 3    sertraline (ZOLOFT) 50 MG tablet, Take 1 tablet (50 mg total) by mouth once daily., Disp: 30 tablet, Rfl: 11    tamsulosin (FLOMAX) 0.4 mg Cap, TAKE 1 CAPSULE(0.4 MG) BY MOUTH EVERY DAY (Patient taking differently: Take 0.4 mg by mouth every evening.), Disp: 90 capsule, Rfl: 3    VITAMIN B-1 100 MG tablet, TAKE 1 TABLET BY MOUTH ONCE DAILY, Disp: 30 tablet, Rfl: 2    folic acid (FOLVITE) 1 MG tablet, Take 1 tablet (1 mg total) by mouth once daily., Disp: 30 tablet, Rfl: 11     Review of Systems:  Review of Systems   Constitutional:   "Negative for fever.   Respiratory:  Negative for chest tightness and shortness of breath.    Cardiovascular:  Negative for chest pain.   Gastrointestinal:  Negative for abdominal pain, blood in stool, diarrhea, nausea and vomiting.   Genitourinary:  Positive for testicular pain.   Musculoskeletal:  Positive for myalgias.   Neurological:  Negative for dizziness, weakness and headaches.   Psychiatric/Behavioral:  Negative for suicidal ideas.          PHYSICAL EXAM     Vitals:    05/06/24 1126   BP: 120/60   BP Location: Right arm   Patient Position: Sitting   BP Method: Medium (Manual)   Pulse: 69   SpO2: 96%   Weight: 83.4 kg (183 lb 13.8 oz)   Height: 5' 6" (1.676 m)      Body mass index is 29.68 kg/m².     Physical Exam  Constitutional:       Appearance: Normal appearance.   HENT:      Head: Normocephalic.   Eyes:      Extraocular Movements: Extraocular movements intact.      Pupils: Pupils are equal, round, and reactive to light.   Cardiovascular:      Rate and Rhythm: Normal rate and regular rhythm.   Pulmonary:      Effort: Pulmonary effort is normal. No respiratory distress.      Breath sounds: Normal breath sounds.   Abdominal:      General: Bowel sounds are normal.      Palpations: Abdomen is soft.   Genitourinary:     Comments: Exam deferred  Musculoskeletal:         General: Tenderness present. Normal range of motion.      Cervical back: Normal range of motion.      Comments: Right flank ttp. No swelling, erythema, warmth. ROM wnl, gait wnl.   Skin:     General: Skin is warm and dry.   Neurological:      General: No focal deficit present.      Mental Status: He is alert and oriented to person, place, and time.         LABS     Lab Results   Component Value Date    HGBA1C 5.9 (H) 02/29/2024     CMP  Sodium   Date Value Ref Range Status   05/02/2024 129 (L) 136 - 145 mmol/L Final     Potassium   Date Value Ref Range Status   05/02/2024 5.0 3.5 - 5.1 mmol/L Final     Comment:     *Result may be interfered by " visible hemolysis     Chloride   Date Value Ref Range Status   05/02/2024 98 95 - 110 mmol/L Final     CO2   Date Value Ref Range Status   05/02/2024 22 (L) 23 - 29 mmol/L Final     Glucose   Date Value Ref Range Status   05/02/2024 94 70 - 110 mg/dL Final     BUN   Date Value Ref Range Status   05/02/2024 10 8 - 23 mg/dL Final     Creatinine   Date Value Ref Range Status   05/02/2024 1.2 0.5 - 1.4 mg/dL Final     Calcium   Date Value Ref Range Status   05/02/2024 9.1 8.7 - 10.5 mg/dL Final     Total Protein   Date Value Ref Range Status   05/02/2024 7.4 6.0 - 8.4 g/dL Final     Comment:     *Result may be interfered by visible hemolysis     Albumin   Date Value Ref Range Status   05/02/2024 4.0 3.5 - 5.2 g/dL Final     Total Bilirubin   Date Value Ref Range Status   05/02/2024 0.5 0.1 - 1.0 mg/dL Final     Comment:     For infants and newborns, interpretation of results should be based  on gestational age, weight and in agreement with clinical  observations.    Premature Infant recommended reference ranges:  Up to 24 hours.............<8.0 mg/dL  Up to 48 hours............<12.0 mg/dL  3-5 days..................<15.0 mg/dL  6-29 days.................<15.0 mg/dL       Alkaline Phosphatase   Date Value Ref Range Status   05/02/2024 62 55 - 135 U/L Final     AST   Date Value Ref Range Status   05/02/2024 67 (H) 10 - 40 U/L Final     Comment:     *Result may be interfered by visible hemolysis     ALT   Date Value Ref Range Status   05/02/2024 39 10 - 44 U/L Final     Anion Gap   Date Value Ref Range Status   05/02/2024 9 8 - 16 mmol/L Final     eGFR if    Date Value Ref Range Status   01/10/2022 >60.0 >60 mL/min/1.73 m^2 Final     eGFR if non    Date Value Ref Range Status   01/10/2022 >60.0 >60 mL/min/1.73 m^2 Final     Comment:     Calculation used to obtain the estimated glomerular filtration  rate (eGFR) is the CKD-EPI equation.        Lab Results   Component Value Date    WBC 5.54  05/02/2024    HGB 13.7 (L) 05/02/2024    HCT 46 05/02/2024    MCV 92 05/02/2024     05/02/2024     Lab Results   Component Value Date    CHOL 123 11/11/2022    CHOL 125 01/10/2022    CHOL 78 (L) 05/06/2021     Lab Results   Component Value Date    HDL 43 11/11/2022    HDL 30 (L) 01/10/2022    HDL 43 05/06/2021     Lab Results   Component Value Date    LDLCALC 37.4 (L) 11/11/2022    LDLCALC 79.6 01/10/2022    LDLCALC 21.8 (L) 05/06/2021     Lab Results   Component Value Date    TRIG 213 (H) 11/11/2022    TRIG 77 01/10/2022    TRIG 66 05/06/2021     Lab Results   Component Value Date    CHOLHDL 35.0 11/11/2022    CHOLHDL 24.0 01/10/2022    CHOLHDL 55.1 (H) 05/06/2021     Lab Results   Component Value Date    TSH 2.089 10/29/2023    U2CNTEO 6.4 05/06/2021       ASSESSMENT & PLAN       1. Abnormal CT of the abdomen  Comments:  Stable. Will refer to GI fort further eval/mgmt of pancreatic abnormality.  Orders:  -     Ambulatory referral/consult to Gastroenterology; Future; Expected date: 05/13/2024    2. Right testicular pain  Comments:  Stable. Keep fu with Urology and Pain Med.    3. Right sided abdominal pain  Comments:  Stable. Recent imaging reviewed.     PE and VSS. PCP and myself uncomfortable writing opioids given patient's concurrent alcohol abuse, benzo use. Contacted Pain Med who is in agreement. Pain Med states they will try to fit him in sooner next week. Keep fu with Urology. Referral placed to GI for further eval of pancreatic abnormality on CT. Patient states he has appt in place to address hernia.    Follow up if symptoms worsen or fail to improve.    Patient was counseled on when to seek emergent care. Patient's plan/treatment was discussed including medications and possible side effects. Verbalized understanding of all instructions.            DARCY Yun, FNP-C  Department of Internal Medicine  [facility]

## 2024-05-06 NOTE — TELEPHONE ENCOUNTER
Spoke with pt who reports right testicle pain for past month. Also reports right sided abdominal pain. Advised ED/UC. PCP office with approval.     Secure chat message sent to Raghav Mclean NP who states ok to be seen in office but should be seen in ED/UC as last resort.     Appointment scheduled for pt. He verbalized understanding.    Reason for Disposition   SEVERE pain (e.g., excruciating)    Additional Information   Negative: Sounds like a life-threatening emergency to the triager    Protocols used: Penis and Scrotum Symptoms-A-[place]

## 2024-05-07 ENCOUNTER — TELEPHONE (OUTPATIENT)
Dept: INTERNAL MEDICINE | Facility: CLINIC | Age: 69
End: 2024-05-07
Payer: MEDICARE

## 2024-05-07 NOTE — TELEPHONE ENCOUNTER
Called to confirm appointment this afternoon  He was seen yesterday , in urgent care  Was coming to office to oxycodone     I explained we do not prescribe that in the resident clinic   And I would forward the message to Dr Larry  and see if he would refill the   Prescription   Per patient , stated Dr Larry would not give him the pain medication   And wanted me to forward the message to his supervisor  Also confirmed he was sent to urology

## 2024-05-07 NOTE — TELEPHONE ENCOUNTER
I called to check on the patient and see what it was that was needed? He asked for the nurse supervisor to call him back. No answer on his line, back up line-his son in law answered and asked me to call the 1st number that I'd just called. I left messages with him and on the patients line to call back and ask for me. Thank you Dr. Larry.

## 2024-05-08 ENCOUNTER — TELEPHONE (OUTPATIENT)
Dept: INTERNAL MEDICINE | Facility: CLINIC | Age: 69
End: 2024-05-08

## 2024-05-08 NOTE — TELEPHONE ENCOUNTER
Cyst on Right testicle and will be seeing Urologist and the Gastro. He has had 4 Ct scans and US, can't bend over to wash his feet. All the doctors are advising me to ask Dr Larry for pain medicine, He has tried electro shocks and Diclofenac He has been advised against both. He is going to get a shot from the pain clinic. In a couple of weeks on 5/17.24. He is asking for pain medicine until 5/17 so he can have some relief. I told him why you were reluctant, The alcohol drinking and potential liver damage. I explained that I could not order him any pain medication and you have indicated that it is not in his best interest. I asked him to try elevation on a pillow with ice in a ziplock and inside a pillow case to decrease inflammation. He states he will try it. Thank you Dr. Larry.

## 2024-05-08 NOTE — TELEPHONE ENCOUNTER
Pt returning your call about wanting to speak with Nursing Supv due to not being prescribed pain med by Dr. Larry.    Listed below is prior msg from Dr. Larry that pt was informed of through telephone encounter in regards to trying to get Norco and Xanax filled.      4/9/24  2:34 PM   Patient is actively drinking. He is drinking too much to be on pain medication or anxiety medication.The combination of alcohol and pain medication or anxiety medication could kill him.    Pt saw Pain Mgmt Physician on 04/29/2024 and sent a msg to Dr. Larry see below.    Rnai Means MA [facility]    4/29/24  4:49 PM  Note  Returned patient call he states that he is in pain and the pain management doctor told him to ask PCP  for hydrocodone because they can only give him so many pills.         Dr. Larry's response.    5/1/24  3:16 PM  Note  Per Dr. Larry,     I will not be prescribing any hydrocodone or ativan for this patient. He is active,y drinking alcohol much more than what he is telling me. I can see this in his blood tests . The alcohol also worsens his A-fib. You can have the nurse relay this message.

## 2024-05-08 NOTE — TELEPHONE ENCOUNTER
----- Message from Rip Collins sent at 5/7/2024  1:27 PM CDT -----  Regarding: Patient Returning Call  Contact: Kleber [phone]  Type: Returning a call    Who left a message? Lorena    When did the practice call? 5/7    Comments:

## 2024-05-08 NOTE — TELEPHONE ENCOUNTER
----- Message from Mk Delvalle sent at 5/7/2024  3:22 PM CDT -----  Contact: [phone]@[email or web address]  Patient is returning a phone call.yes   Who left a message for the patient: Lorena Hansen RN  Does patient know what this is regarding:    Would you like a call back, or a response through your [facility] portal?:   call back   Comments:

## 2024-05-14 ENCOUNTER — TELEPHONE (OUTPATIENT)
Dept: GASTROENTEROLOGY | Facility: CLINIC | Age: 69
End: 2024-05-14
Payer: MEDICARE

## 2024-05-14 NOTE — TELEPHONE ENCOUNTER
Patient appointment was canceled with Dr. March, pt was told that he will need to see CRS. Verbal understanding. Message sent to staff.       ----- Message from Sanjuanita Sanchez sent at 5/14/2024 10:22 AM CDT -----  .Type:  Needs Medical Advice    Who Called: pt    Would the patient rather a call back or a response via [facility]? Call back  Best Call Back Number: [phone]  Additional Information:     Pt stated he missed a call and would like a call back

## 2024-05-14 NOTE — TELEPHONE ENCOUNTER
Left VM for patient to call the office back.       ----- Message from Sandra Wright sent at 5/14/2024 10:07 AM CDT -----  Type:  Patient Returning Call    Who Called:pt  Who Left Message for Patient:office  Does the patient know what this is regarding?:no  Would the patient rather a call back or a response via [facility]? Call back  Best Call Back Number:[phone]  Additional Information:

## 2024-05-14 NOTE — TELEPHONE ENCOUNTER
Left VM for patient to call the office back. Patient will need to been seen by CRS per Dr. March (Looks like he has an abnormal finding in the pancreatic head. Should see AES)

## 2024-05-15 ENCOUNTER — PATIENT OUTREACH (OUTPATIENT)
Dept: EMERGENCY MEDICINE | Facility: HOSPITAL | Age: 69
End: 2024-05-15
Payer: MEDICARE

## 2024-05-15 NOTE — PROGRESS NOTES
Call placed to remind Pt of his upcoming appt's on 5-16-24 for a Wellness visit at 9am. And on 5-17-24 with Pain management at 11:20. Pt verbalized understanding.

## 2024-05-15 NOTE — PROGRESS NOTES
Call placed per ED Navigator to f/u from last encounter. Pt states he is doing ok and in some pain. He is scheduled to f/u with pain management on 5-17-24 at 11:20 am. Pt plans to keep the appt. ED Navigator will continue to f/u and assist as needed.

## 2024-05-17 ENCOUNTER — PROCEDURE VISIT (OUTPATIENT)
Dept: PAIN MEDICINE | Facility: CLINIC | Age: 69
End: 2024-05-17
Payer: MEDICARE

## 2024-05-17 VITALS
HEART RATE: 116 BPM | HEIGHT: 66 IN | SYSTOLIC BLOOD PRESSURE: 93 MMHG | WEIGHT: 179.81 LBS | DIASTOLIC BLOOD PRESSURE: 61 MMHG | BODY MASS INDEX: 28.9 KG/M2

## 2024-05-17 DIAGNOSIS — N50.811 TESTICULAR PAIN, RIGHT: Primary | ICD-10-CM

## 2024-05-17 DIAGNOSIS — R10.31 RIGHT INGUINAL PAIN: ICD-10-CM

## 2024-05-17 PROCEDURE — 76942 ECHO GUIDE FOR BIOPSY: CPT | Mod: 26,HCNC,S$GLB, | Performed by: STUDENT IN AN ORGANIZED HEALTH CARE EDUCATION/TRAINING PROGRAM

## 2024-05-17 PROCEDURE — 99499 UNLISTED E&M SERVICE: CPT | Mod: HCNC,S$GLB,, | Performed by: STUDENT IN AN ORGANIZED HEALTH CARE EDUCATION/TRAINING PROGRAM

## 2024-05-17 PROCEDURE — 64425 NJX AA&/STRD II IH NERVES: CPT | Mod: HCNC,RT,S$GLB, | Performed by: STUDENT IN AN ORGANIZED HEALTH CARE EDUCATION/TRAINING PROGRAM

## 2024-05-17 RX ORDER — TRIAMCINOLONE ACETONIDE 40 MG/ML
40 INJECTION, SUSPENSION INTRA-ARTICULAR; INTRAMUSCULAR
Status: SHIPPED | OUTPATIENT
Start: 2024-05-17

## 2024-05-17 NOTE — PROCEDURES
Right Ilioinguinal Nerve Block with Ultrasound    Date/Time: 5/17/2024 11:20 AM    Performed by: Nancy Bahena DO  Authorized by: Nancy Bahena DO  Consent Done: Yes  Indications: pain relief  Body area: trunk  Nerve: ilioinguinal  Laterality: right    Patient sedated: no  : triamcinolone 40 mg/ml.  Preparation: Patient was prepped and draped in the usual sterile fashion.  Patient position: supine  Needle size: 22 G  Location technique: ultrasound guidance  Local Anesthetic: lidocaine 2% without epinephrine and bupivacaine 0.25% without epinephrine  Anesthetic total: 5 mL  Outcome: pain improved  Patient tolerance: Patient tolerated the procedure well with no immediate complications  Comments: Patient reports his pain decreased from 10/10 to 5/10 following the procedure.            Right ilioinguinal Nerve Block Procedure    Procedure: Right Ilioinguinal Nerve Block with Dynamic Ultrasound Guidance    Pre-operative diagnosis: Testicular pain, right [N50.811]    Post-operative diagnosis: Testicular pain, right [N50.811]    Findings: Needle placement and local anesthetic infiltration, as visualized by Ultrasound, were consistent with a successful nerve block.    Procedure in Detail: After risks and benefits were explained including bleeding, infection, worsening of the pain, damage to the area being injected, vascular injection, allergy to medications and nerve damage, consent was obtained.  Questions related to the procedure were answered.    The patient was positioned supine with a small bump under the ipsilateral buttock.  The anterior superior iliac spine (ASIS), internal oblique muscle (IOM) and transversus abdominus muscle (NOLAN) were identified.  The ilioinguinal nerve was identified just medial to the ASIS between the IOM and NOLAN.  The skin overlying the target area was prepped with chloroprep and allowed to dry.  Using an out-of-plane technique, a 22G 2 inch needle was advanced into the fascial  plane of the ilioinguinal nerve taking care not to directly contact the nerve.  After heme-negative aspiration the nerve area was slowly infiltrated with the injectate under direct visualization.  The needle was removed and small dressing applied.    Blood Loss: nil    Injectate: Bupivacaine 0.25% 4mL + 1 mL of Kenalog 40 mg/mL      Post Procedure:  The patient tolerated the procedure well and reported his pain decreased from 10/10 to 5/10   Following the procedure.    Follow Up: 4 weeks or sooner if needed.     Nancy Bahena,    Interventional Pain Management

## 2024-05-23 ENCOUNTER — OFFICE VISIT (OUTPATIENT)
Dept: INTERNAL MEDICINE | Facility: CLINIC | Age: 69
End: 2024-05-23
Payer: MEDICARE

## 2024-05-23 ENCOUNTER — E-CONSULT (OUTPATIENT)
Dept: GASTROENTEROLOGY | Facility: HOSPITAL | Age: 69
End: 2024-05-23
Payer: MEDICARE

## 2024-05-23 VITALS
DIASTOLIC BLOOD PRESSURE: 68 MMHG | HEART RATE: 73 BPM | BODY MASS INDEX: 28.27 KG/M2 | HEIGHT: 66 IN | WEIGHT: 175.94 LBS | SYSTOLIC BLOOD PRESSURE: 110 MMHG | OXYGEN SATURATION: 98 %

## 2024-05-23 DIAGNOSIS — K86.89 PANCREATIC MASS: Primary | ICD-10-CM

## 2024-05-23 DIAGNOSIS — F41.9 ANXIETY: ICD-10-CM

## 2024-05-23 DIAGNOSIS — N45.1 EPIDIDYMITIS, RIGHT: ICD-10-CM

## 2024-05-23 DIAGNOSIS — F10.20 ALCOHOLISM: ICD-10-CM

## 2024-05-23 DIAGNOSIS — R93.5 ABNORMAL CT OF THE ABDOMEN: Primary | ICD-10-CM

## 2024-05-23 DIAGNOSIS — R10.9 RIGHT FLANK PAIN: ICD-10-CM

## 2024-05-23 PROCEDURE — 99451 NTRPROF PH1/NTRNET/EHR 5/>: CPT | Mod: ,,, | Performed by: INTERNAL MEDICINE

## 2024-05-23 PROCEDURE — 99999 PR PBB SHADOW E&M-EST. PATIENT-LVL IV: CPT | Mod: PBBFAC,HCNC,, | Performed by: INTERNAL MEDICINE

## 2024-05-23 PROCEDURE — 3044F HG A1C LEVEL LT 7.0%: CPT | Mod: HCNC,CPTII,S$GLB, | Performed by: INTERNAL MEDICINE

## 2024-05-23 PROCEDURE — 3074F SYST BP LT 130 MM HG: CPT | Mod: HCNC,CPTII,S$GLB, | Performed by: INTERNAL MEDICINE

## 2024-05-23 PROCEDURE — 3008F BODY MASS INDEX DOCD: CPT | Mod: HCNC,CPTII,S$GLB, | Performed by: INTERNAL MEDICINE

## 2024-05-23 PROCEDURE — 99215 OFFICE O/P EST HI 40 MIN: CPT | Mod: HCNC,S$GLB,, | Performed by: INTERNAL MEDICINE

## 2024-05-23 PROCEDURE — 1125F AMNT PAIN NOTED PAIN PRSNT: CPT | Mod: HCNC,CPTII,S$GLB, | Performed by: INTERNAL MEDICINE

## 2024-05-23 PROCEDURE — 1101F PT FALLS ASSESS-DOCD LE1/YR: CPT | Mod: HCNC,CPTII,S$GLB, | Performed by: INTERNAL MEDICINE

## 2024-05-23 PROCEDURE — 3078F DIAST BP <80 MM HG: CPT | Mod: HCNC,CPTII,S$GLB, | Performed by: INTERNAL MEDICINE

## 2024-05-23 PROCEDURE — 3288F FALL RISK ASSESSMENT DOCD: CPT | Mod: HCNC,CPTII,S$GLB, | Performed by: INTERNAL MEDICINE

## 2024-05-23 PROCEDURE — 1159F MED LIST DOCD IN RCRD: CPT | Mod: HCNC,CPTII,S$GLB, | Performed by: INTERNAL MEDICINE

## 2024-05-23 RX ORDER — SERTRALINE HYDROCHLORIDE 100 MG/1
100 TABLET, FILM COATED ORAL DAILY
Qty: 90 TABLET | Refills: 3 | Status: SHIPPED | OUTPATIENT
Start: 2024-05-23

## 2024-05-23 RX ORDER — HYDROCODONE BITARTRATE AND ACETAMINOPHEN 5; 325 MG/1; MG/1
1 TABLET ORAL EVERY 12 HOURS PRN
Qty: 30 TABLET | Refills: 0 | Status: SHIPPED | OUTPATIENT
Start: 2024-05-23

## 2024-05-23 NOTE — CONSULTS
[facility]  Response for E-Consult     Patient Name: Donald Warren Abadie  MRN: [id number]  Primary Care Provider: Bacilio Larry MD   Requesting Provider: Bacilio Reyes Jr., MD  E-Consult to Gastroenterology  Consult performed by: Mode Wood MD  Consult ordered by: Bacilio Reyes Jr., MD  Reason for consult: hypodense mass on CT  Assessment/Recommendations: Next step would be to do an EUS to see if it's solid or cystic, can biopsy at the same time.  Let us know when we can reach out to him to schedule in the next few weeks          Recommendation: as above    Contingency that warrants a repeat eConsult or referral: na    Total time of Consultation: 5 minute    I did not speak to the requesting provider verbally about this.     *This eConsult is based on the clinical data available to me and is furnished without benefit of a physical examination. The eConsult will need to be interpreted in light of any clinical issues or changes in patient status not available to me at the time of filing this eConsults. Significant changes in patient condition or level of acuity should result in immediate formal consultation and reevaluation. Please alert me if you have further questions.    Thank you for this eConsult referral.     Mode Wood MD  [facility]

## 2024-05-23 NOTE — Clinical Note
He can get the ESUS anytime you are ready.  His medical understanding is poor and hearing is poor.  Do I need to sign off or order it?

## 2024-05-23 NOTE — PROGRESS NOTES
"  Kleber is a 68 y.o. male with T2DM, HLD, A fib, CHF, renal cell cancer, gout, depression, anxiety, substance use disorder who presents for a follow up visit today with c/o right testicular pain that has been going on for about 2-3 months. Recent right flank pain ( 1 month)  " can mow my lawn, can't wash my truck, can't even wash my own ass"  that was reported at recent ER visit. He saw A NP on 5/6/2024.  He states wants some  hydrocodone  pains.     F/w Urology (Dr. Goddard) and established care 3/28/24. Taking hydrocodone for R testicular pain with relief. US 2/22/24 showed right epididymal head cyst. Currently on Bactrim for suspected uti.     He complains of 10/10  right testicular pain.  He has also had some right flank pain.  He had a recent CT scan I was abdomen and pelvis after seeing the nurse practitioner and he had a 15 mm area in the head of the pancreas that was ill-defined.  The nurse practitioner make him a gastrointestinal appointment however it was canceled recently.  Looks like it is supposed to have been tomorrow.  He is misunderstanding that this was for his right-sided flank pain which seems musculoskeletal in nature.  Today we had a long discussion about his pancreatic mass in the workup that was needed for that.    Patient Active Problem List   Diagnosis    Hyperlipidemia associated with type 2 diabetes mellitus    Idiopathic chronic gout of multiple sites without tophus    Atrial fibrillation with RVR    Alcohol withdrawal    Overweight (BMI 25.0-29.9)    Fatty liver    Renal cell cancer    Hemorrhoids    Personal history of kidney cancer    Primary osteoarthritis of right knee    Hyponatremia    Alcohol use disorder, severe, in controlled environment    Atrial flutter    Substance induced mood disorder    GERD (gastroesophageal reflux disease)    Alcohol use disorder, moderate, dependence    Chronic pain of right knee    Type 2 diabetes mellitus without complication, without long-term current " "use of insulin    Aortic atherosclerosis    Primary osteoarthritis involving multiple joints    Generalized anxiety disorder    Chronic diastolic congestive heart failure    Metabolic acidosis    Hypomagnesemia    Recurrent major depressive disorder, in partial remission    Bronchitis    Testicular pain, right    Benign prostatic hyperplasia with nocturia    JASS (acute kidney injury)      /68 (BP Location: Right arm, Patient Position: Sitting, BP Method: Medium (Manual))   Pulse 73   Ht 5' 6" (1.676 m)   Wt 79.8 kg (175 lb 14.8 oz)   SpO2 98%   BMI 28.40 kg/m²   He is a 68-year-old gentleman in no acute distress.  He does raise some red flags the about looking drug seeking and both what he says and his actions.  Does not appear to be intoxicated today.  Neck is supple.  Chest is clear.  Hearing is very poor.  Abdomen is soft and nontender without any masses.  Examination of both flanks unremarkable.  He sees Urology tomorrow for his testicular pain so we defer to exam of his under carriage .      Assessment and plan::    Pancreatic mass  -     E-Consult to Gastroenterology    Epididymitis, right  -     Ambulatory referral/consult to Physical/Occupational Therapy; Future; Expected date: 05/30/2024  -     HYDROcodone-acetaminophen (NORCO) 5-325 mg per tablet; Take 1 tablet by mouth every 12 (twelve) hours as needed for Pain.  Dispense: 30 tablet; Refill: 0    Right flank pain    Anxiety    Alcoholism    Other orders  -     sertraline (ZOLOFT) 100 MG tablet; Take 1 tablet (100 mg total) by mouth once daily.  Dispense: 90 tablet; Refill: 3        I consulted GI to see what the next step would be for his pancreatic mass.  He needs a EUS with possible biopsy.  I am going to work with them to help schedule that.  I explained to him several times that he is going to see Gastroenterology for his pancreatic mass and not his right-sided abdominal pain.  I am not sure if his pain is real or just a play  get more pain " medicine but he does have a possible real problem in a pancreatic mass that needs to be evaluated.  For his testicular pain I am going to refill his hydrocodone [facility] time however told him this is the last time I am going to refill any narcotics for him and he needs to work with Urology and pain medicine to resolve these issues.  For his right-sided muscle skeletal pain I am going to have him get some physical therapy.  For his anxiety I told him that he should not be on benzos if he is already on narcotics and already abusing alcohol.  He is telling me he is only drinking 6-8 beers a day which is better than he has been in the past.  He does not wish to see Psychiatry.  He says he saw them before and they helped him get off alcohol but he is happy where he is now and not interested in stopping.  I did leave the door open for him see Psychiatry if he wants to

## 2024-06-04 ENCOUNTER — TELEPHONE (OUTPATIENT)
Dept: ENDOSCOPY | Facility: HOSPITAL | Age: 69
End: 2024-06-04
Payer: MEDICARE

## 2024-06-04 NOTE — TELEPHONE ENCOUNTER
Telephone patient to scheduled EUS with no answer. Direct contact given to call back to schedule procedure.

## 2024-06-05 ENCOUNTER — TELEPHONE (OUTPATIENT)
Dept: PHARMACY | Facility: CLINIC | Age: 69
End: 2024-06-05
Payer: MEDICARE

## 2024-06-05 ENCOUNTER — TELEPHONE (OUTPATIENT)
Dept: ELECTROPHYSIOLOGY | Facility: CLINIC | Age: 69
End: 2024-06-05
Payer: MEDICARE

## 2024-06-05 DIAGNOSIS — I48.0 PAROXYSMAL ATRIAL FIBRILLATION: Primary | ICD-10-CM

## 2024-06-05 DIAGNOSIS — Z59.86: ICD-10-CM

## 2024-06-05 SDOH — SOCIAL DETERMINANTS OF HEALTH (SDOH): FINANCIAL INSECURITY: Z59.86

## 2024-06-05 NOTE — TELEPHONE ENCOUNTER
----- Message from Es Patel sent at 6/4/2024 11:41 AM CDT -----  Regarding: Medication  Chani [phone] pt daughter is asking if Dr. Angel can do an appeal with his insurance company to put the pt back on Multaq ? He recently switched him to Pacerone 200 mg because of insurance reasons.    Thanks

## 2024-06-05 NOTE — TELEPHONE ENCOUNTER
I have spoken with Donald Warren Abadie and informed him of the [employer] application process for Multaq and what's required to apply.  Donald Warren Abadie will provide the following documents: Proof of household Income( such as social security statement, 1099 form, pension statement or 3 consecutive pay stubs and Copy of all Insurance cards( front and back)      I will follow up with the patient in 5 business days.

## 2024-06-05 NOTE — TELEPHONE ENCOUNTER
Spoke with patient's daughter Chani who requests that a PA be done on Mutaq for patient and it is no longer on her father's insurance formulary and he is currently taking the Pacerone which Dr Angel has explained that he would prefer that he not take long term. Daughter advised that I will work on PA and effort to submit tomorrow and can take up to 72 hours to receive a determination. Daughter also offered to refer to Pharmacy patient assistance to see if her father would qualify for Multaq patient assistance through the . Daughter verbalized understanding and would like the referral to be place to Pharmacy Patient Assistance.

## 2024-06-05 NOTE — TELEPHONE ENCOUNTER
----- Message from Claribel Corrales RN sent at 6/5/2024  4:28 PM CDT -----  Regarding: Referral / Missing information  Referral placed for patient assistance for Multaq today and forgot to add important contact information  Please contact Rafita (Son in Law) at  [phone] or  (Chani/Daughter) at [phone] as patient requires assistance with medical care.

## 2024-06-05 NOTE — TELEPHONE ENCOUNTER
----- Message from Joan Morris MA sent at 6/5/2024 11:10 AM CDT -----  Regarding: RE: Medication Appeal  Contact: [phone] or [phone] (Chani/Daughter)    ----- Message -----  From: Melba Kowalski  Sent: 6/5/2024  10:48 AM CDT  To: [facility] Staff  Subject: Medication Appeal                                CONSULT/ADVISORY    Name of Caller:  Rafita (Son in Law)    Contact Preference: [phone] or [phone] (Chani/Daughter)    Nature of Call: Requesting to speak with a nurse about pts medication to appeal it.

## 2024-06-06 ENCOUNTER — OFFICE VISIT (OUTPATIENT)
Dept: HEPATOLOGY | Facility: CLINIC | Age: 69
End: 2024-06-06
Attending: INTERNAL MEDICINE
Payer: MEDICARE

## 2024-06-06 ENCOUNTER — TELEPHONE (OUTPATIENT)
Dept: ELECTROPHYSIOLOGY | Facility: CLINIC | Age: 69
End: 2024-06-06
Payer: MEDICARE

## 2024-06-06 VITALS
BODY MASS INDEX: 29.12 KG/M2 | DIASTOLIC BLOOD PRESSURE: 65 MMHG | OXYGEN SATURATION: 98 % | WEIGHT: 181.19 LBS | HEART RATE: 79 BPM | TEMPERATURE: 99 F | SYSTOLIC BLOOD PRESSURE: 127 MMHG | RESPIRATION RATE: 18 BRPM | HEIGHT: 66 IN

## 2024-06-06 DIAGNOSIS — K76.0 METABOLIC DYSFUNCTION-ASSOCIATED STEATOTIC LIVER DISEASE (MASLD): Primary | ICD-10-CM

## 2024-06-06 PROCEDURE — 1159F MED LIST DOCD IN RCRD: CPT | Mod: HCNC,CPTII,S$GLB, | Performed by: INTERNAL MEDICINE

## 2024-06-06 PROCEDURE — 1125F AMNT PAIN NOTED PAIN PRSNT: CPT | Mod: HCNC,CPTII,S$GLB, | Performed by: INTERNAL MEDICINE

## 2024-06-06 PROCEDURE — 3044F HG A1C LEVEL LT 7.0%: CPT | Mod: HCNC,CPTII,S$GLB, | Performed by: INTERNAL MEDICINE

## 2024-06-06 PROCEDURE — 99999 PR PBB SHADOW E&M-EST. PATIENT-LVL V: CPT | Mod: PBBFAC,HCNC,, | Performed by: INTERNAL MEDICINE

## 2024-06-06 PROCEDURE — 3008F BODY MASS INDEX DOCD: CPT | Mod: HCNC,CPTII,S$GLB, | Performed by: INTERNAL MEDICINE

## 2024-06-06 PROCEDURE — 99214 OFFICE O/P EST MOD 30 MIN: CPT | Mod: HCNC,S$GLB,, | Performed by: INTERNAL MEDICINE

## 2024-06-06 PROCEDURE — 3074F SYST BP LT 130 MM HG: CPT | Mod: HCNC,CPTII,S$GLB, | Performed by: INTERNAL MEDICINE

## 2024-06-06 PROCEDURE — 1160F RVW MEDS BY RX/DR IN RCRD: CPT | Mod: HCNC,CPTII,S$GLB, | Performed by: INTERNAL MEDICINE

## 2024-06-06 PROCEDURE — 3078F DIAST BP <80 MM HG: CPT | Mod: HCNC,CPTII,S$GLB, | Performed by: INTERNAL MEDICINE

## 2024-06-06 NOTE — PROGRESS NOTES
"HEPATOLOGY CONSULTATION    Referring Physician:   Current Corresponding Physician: Dr. Bacilio Larry    Reason for Consultation: Consultation for evaluation of Fatty Liver and Elevated Hepatic Enzymes    History of Present Illness: Donald Warren Abadie is a 68 y.o. malewho presents for evaluation of   Chief Complaint   Patient presents with    Fatty Liver    Elevated Hepatic Enzymes   This 68-year-old gentleman was referred back to the hepatology Clinic for evaluation and management of elevated liver enzymes secondary to a combination of alcohol use disorder and metabolic dysfunction associated steatotic liver disease.  He was last seen in the hepatology Clinic 4 years ago.  At that time a FibroScan suggested stage III fibrosis.  He has no symptoms of chronic liver disease.  At that time he had endorse history of heavy alcohol consumption but had quit alcohol in 2019.  I understand that he is resumed alcohol consumption.  He does have atrial fibrillation is being considered for ablation but has been advised to abstain from alcohol 1st.  His liver synthetic function is normal.  I note that on his last CT scan there were no liver lesions but there was a description of the pancreatic lesion.    Past Medical History:   Diagnosis Date    A-fib     Alcohol abuse     Anxiety     Arthritis     Chronic gout     Diabetes mellitus     DM (diabetes mellitus) 11/16/2016    "boarderline, not taking any meds currently"    Fatty liver     Hyperlipidemia     Renal cell cancer 2014    S/P TKR (total knee replacement), right 06/27/2019     Outpatient Encounter Medications as of 6/6/2024   Medication Sig Dispense Refill    allopurinoL (ZYLOPRIM) 100 MG tablet TAKE 2 TABLETS(200 MG) BY MOUTH EVERY  tablet 0    amiodarone (PACERONE) 200 MG Tab Take 1 tablet (200 mg total) by mouth once daily. Begin taking AFTER completing 2 week loading dose. 30 tablet 11    cyanocobalamin (VITAMIN B-12) 1000 MCG tablet Take 1 tablet (1,000 mcg " total) by mouth once daily.      diclofenac sodium (VOLTAREN) 1 % Gel Apply 2 g topically 3 (three) times daily as needed (Right knee - hand pain).      ELIQUIS 5 mg Tab TAKE 1 TABLET BY MOUTH TWICE DAILY (Patient taking differently: Take 5 mg by mouth 2 (two) times daily.) 180 tablet 3    gabapentin (NEURONTIN) 300 MG capsule Take 1 capsule (300 mg total) by mouth 2 (two) times daily. 60 capsule 1    HYDROcodone-acetaminophen (NORCO) 5-325 mg per tablet Take 1 tablet by mouth every 12 (twelve) hours as needed for Pain. 30 tablet 0    ketoconazole (NIZORAL) 2 % cream Apply topically once daily. Apply to affected skin from toes to heels twice a day for 3-4 weeks. 60 g 6    magnesium oxide (MAG-OX) 400 mg (241.3 mg magnesium) tablet Take 1 tablet (400 mg total) by mouth once daily. 14 tablet 0    metFORMIN (GLUCOPHAGE-XR) 500 MG ER 24hr tablet Take 2 tablets (1,000 mg total) by mouth 2 (two) times daily with meals. 360 tablet 3    metoprolol succinate (TOPROL-XL) 25 MG 24 hr tablet Take 1 tablet (25 mg total) by mouth once daily. 90 tablet 3    multivitamin (THERAGRAN) tablet Take 1 tablet by mouth once daily.      omega-3 acid ethyl esters (LOVAZA) 1 gram capsule Take 2 g by mouth 2 (two) times daily.      pantoprazole (PROTONIX) 40 MG tablet Take 1 tablet (40 mg total) by mouth once daily. 90 tablet 3    rOPINIRole (REQUIP) 1 MG tablet TAKE 1 TABLET(1 MG) BY MOUTH EVERY EVENING 90 tablet 3    rosuvastatin (CRESTOR) 20 MG tablet TAKE 1 TABLET(20 MG) BY MOUTH EVERY DAY 90 tablet 3    sertraline (ZOLOFT) 100 MG tablet Take 1 tablet (100 mg total) by mouth once daily. 90 tablet 3    tamsulosin (FLOMAX) 0.4 mg Cap TAKE 1 CAPSULE(0.4 MG) BY MOUTH EVERY DAY (Patient taking differently: Take 0.4 mg by mouth every evening.) 90 capsule 3    VITAMIN B-1 100 MG tablet TAKE 1 TABLET BY MOUTH ONCE DAILY 30 tablet 2    folic acid (FOLVITE) 1 MG tablet Take 1 tablet (1 mg total) by mouth once daily. 30 tablet 11      Facility-Administered Encounter Medications as of 6/6/2024   Medication Dose Route Frequency Provider Last Rate Last Admin    triamcinolone acetonide injection 40 mg  40 mg Intramuscular 1 time in Clinic/HOD          Review of patient's allergies indicates:   Allergen Reactions    No known drug allergies      Family History   Problem Relation Name Age of Onset    Lung cancer Father      Colon cancer Father      Cancer Father          lung cancer    Diabetes Mother      Alzheimer's disease Mother      Lung cancer Sister      Cancer Sister          lung    Colon polyps Brother      Alcohol abuse Brother      Alzheimer's disease Brother      Cirrhosis Neg Hx         Social History     Socioeconomic History    Marital status: Single   Occupational History     Employer: [employer]   Tobacco Use    Smoking status: Never    Smokeless tobacco: Never   Substance and Sexual Activity    Alcohol use: Yes     Alcohol/week: 168.0 standard drinks of alcohol     Types: 168 Cans of beer per week     Comment: 12-24  beers daily    Drug use: No    Sexual activity: Not Currently     Partners: Female     Social Determinants of Health     Financial Resource Strain: Low Risk  (5/16/2024)    Overall Financial Resource Strain (CARDIA)     Difficulty of Paying Living Expenses: Not hard at all   Food Insecurity: No Food Insecurity (5/16/2024)    Hunger Vital Sign     Worried About Running Out of Food in the Last Year: Never true     Ran Out of Food in the Last Year: Never true   Transportation Needs: No Transportation Needs (5/16/2024)    PRAPARE - Transportation     Lack of Transportation (Medical): No     Lack of Transportation (Non-Medical): No   Physical Activity: Inactive (5/16/2024)    Exercise Vital Sign     Days of Exercise per Week: 0 days     Minutes of Exercise per Session: 0 min   Stress: No Stress Concern Present (5/16/2024)    [employer] - Occupational Stress Questionnaire     Feeling  of Stress : Only a little   Recent Concern: Stress - Stress Concern Present (4/2/2024)    [employer] - Occupational Stress Questionnaire     Feeling of Stress : To some extent   Housing Stability: Low Risk  (4/2/2024)    Housing Stability Vital Sign     Unable to Pay for Housing in the Last Year: No     Number of Places Lived in the Last Year: 1     Unstable Housing in the Last Year: No     Review of Systems   Constitutional:  Negative for activity change, appetite change, chills, fatigue and unexpected weight change.   HENT:  Negative for congestion, facial swelling and tinnitus.    Eyes:  Negative for visual disturbance.   Respiratory:  Negative for cough, shortness of breath and wheezing.    Cardiovascular:  Negative for chest pain and palpitations.   Gastrointestinal:  Negative for abdominal distention.   Genitourinary:  Negative for dysuria.   Musculoskeletal:  Negative for arthralgias, joint swelling and myalgias.   Neurological:  Negative for syncope and headaches.   Hematological:  Does not bruise/bleed easily.   Psychiatric/Behavioral:  Negative for confusion.      Vitals:    06/06/24 1021   BP: 127/65   Pulse: 79   Resp: 18   Temp: 98.5 °F (36.9 °C)       Physical Exam  Constitutional:       Appearance: He is well-developed.   Eyes:      General: No scleral icterus.  Cardiovascular:      Rate and Rhythm: Normal rate and regular rhythm.      Heart sounds: Normal heart sounds.   Pulmonary:      Effort: Pulmonary effort is normal. No respiratory distress.      Breath sounds: Normal breath sounds. No wheezing.   Abdominal:      General: Bowel sounds are normal. There is no distension.      Palpations: Abdomen is soft. There is no mass.      Tenderness: There is no abdominal tenderness. There is no rebound.   Musculoskeletal:         General: Normal range of motion.   Lymphadenopathy:      Cervical: No cervical adenopathy.   Skin:     General: Skin is warm and dry.   Neurological:       Mental Status: He is alert and oriented to person, place, and time.         MELD 3.0: 12 at 10/29/2023  6:09 AM  MELD-Na: 8 at 10/29/2023  6:09 AM  Calculated from:  Serum Creatinine: 1.2 mg/dL at 10/29/2023  6:09 AM  Serum Sodium: 132 mmol/L at 10/29/2023  6:09 AM  Total Bilirubin: 0.8 mg/dL (Using min of 1 mg/dL) at 10/28/2023  6:39 PM  Serum Albumin: 3.9 g/dL (Using max of 3.5 g/dL) at 10/29/2023  6:09 AM  INR(ratio): 1.0 at 10/28/2023  4:03 PM  Age at listing (hypothetical): 68 years  Sex: Male at 10/29/2023  6:09 AM      Lab Results   Component Value Date    GLU 94 05/02/2024    BUN 10 05/02/2024    CREATININE 1.2 05/02/2024    CALCIUM 9.1 05/02/2024     (L) 05/02/2024    K 5.0 05/02/2024    CL 98 05/02/2024    PROT 7.4 05/02/2024    CO2 22 (L) 05/02/2024    ANIONGAP 9 05/02/2024    WBC 5.54 05/02/2024    RBC 4.48 (L) 05/02/2024    HGB 13.7 (L) 05/02/2024    HCT 46 05/02/2024    HCT 41.3 05/02/2024    MCV 92 05/02/2024    MCH 30.6 05/02/2024    MCHC 33.2 05/02/2024     Lab Results   Component Value Date    RDW 17.1 (H) 05/02/2024     05/02/2024    MPV 9.4 05/02/2024    GRAN 2.9 05/02/2024    GRAN 52.4 05/02/2024    LYMPH 1.7 05/02/2024    LYMPH 30.3 05/02/2024    MONO 0.7 05/02/2024    MONO 12.8 05/02/2024    EOSINOPHIL 2.7 05/02/2024    BASOPHIL 1.4 05/02/2024    EOS 0.2 05/02/2024    EOS 1 (A) 09/13/2006    BASO 0.08 05/02/2024    APTT 54.1 (H) 03/25/2019    GROUPTRH A POS 08/28/2014    NATASHA Negative 10/04/2006     (H) 04/22/2024    CHOL 123 11/11/2022    TRIG 213 (H) 11/11/2022    HDL 43 11/11/2022    CHOLHDL 35.0 11/11/2022    TOTALCHOLEST 2.9 11/11/2022    ALBUMIN 4.0 05/02/2024    BILIDIR 0.7 (H) 03/23/2019    AST 67 (H) 05/02/2024    ALT 39 05/02/2024    ALKPHOS 62 05/02/2024    MG 1.2 (L) 04/22/2024    LABPROT 10.9 10/28/2023    INR 1.0 10/28/2023    PSA 3.8 08/09/2017       Assessment and Plan:  Patient Active Problem List   Diagnosis    Hyperlipidemia associated with type 2  diabetes mellitus    Idiopathic chronic gout of multiple sites without tophus    Atrial fibrillation with RVR    Alcohol withdrawal    Overweight (BMI 25.0-29.9)    Metabolic dysfunction-associated steatotic liver disease (MASLD)    Renal cell cancer    Hemorrhoids    Personal history of kidney cancer    Primary osteoarthritis of right knee    Hyponatremia    Alcohol use disorder, severe, in controlled environment    Atrial flutter    Substance induced mood disorder    GERD (gastroesophageal reflux disease)    Alcohol use disorder, moderate, dependence    Chronic pain of right knee    Type 2 diabetes mellitus without complication, without long-term current use of insulin    Aortic atherosclerosis    Primary osteoarthritis involving multiple joints    Generalized anxiety disorder    Chronic diastolic congestive heart failure    Metabolic acidosis    Hypomagnesemia    Recurrent major depressive disorder, in partial remission    Bronchitis    Testicular pain, right    Benign prostatic hyperplasia with nocturia    JASS (acute kidney injury)     Donald Warren Abadie is a 68 y.o. male withFatty Liver and Elevated Hepatic Enzymes    Impression:  Mildly elevated liver enzymes and stage III fibrosis secondary to combination of alcohol use and metabolic dysfunction associated steatotic liver disease  Pancreatic lesion    Plan:  I discussed the importance of strict abstinence.  I will enter him into a hepatoma surveillance program with stage III fibrosis.  He will return to clinic in 6 months time.  I discussed new pharmacotherapy for metabolic dysfunction associated steatotic liver disease, but I would like to see how he does off alcohol.  I will make a referral to advanced endoscopy with regard to the pancreatic lesion.

## 2024-06-06 NOTE — TELEPHONE ENCOUNTER
Attempted to contact patient's daughter Chani as well as Rafita, son in law to notify that the PA for Multaq had been approved , but no answer received. Voicemail left on both numbers advising that PA was approved and request return call to provide  preferred pharmacy for prescription and to discuss further instruction regarding transition from Amiodarone to Multaq ,

## 2024-06-06 NOTE — TELEPHONE ENCOUNTER
PA for Multaq submitted via Cover My Meds as requested as this would be the preferred medication for the patient. Spoke with daughter about the alternative medication Sotalol mentioned by Dr Angel at the last office visit. Daughter states that they would be willing to switch if Multaq not approved and the Sotalol would be better for the patient.      Follow up - PA approved.       Donald Abadie (Key: [id number])  PA Case ID #: [id number]  Need Help? Call us at [phone]  Outcome  Approved today  PA Case: [id number], Status: Approved, Coverage Starts on: 1/1/2024 12:00:00 AM, Coverage Ends on: 12/31/2024 12:00:00 AM. Questions? Contact [phone].  Authorization Expiration Date: 12/30/2024

## 2024-06-07 ENCOUNTER — TELEPHONE (OUTPATIENT)
Dept: ENDOSCOPY | Facility: HOSPITAL | Age: 69
End: 2024-06-07
Payer: MEDICARE

## 2024-06-07 ENCOUNTER — TELEPHONE (OUTPATIENT)
Dept: ELECTROPHYSIOLOGY | Facility: CLINIC | Age: 69
End: 2024-06-07
Payer: MEDICARE

## 2024-06-07 ENCOUNTER — PATIENT MESSAGE (OUTPATIENT)
Dept: ENDOSCOPY | Facility: HOSPITAL | Age: 69
End: 2024-06-07
Payer: MEDICARE

## 2024-06-07 DIAGNOSIS — K86.89 MASS OF PANCREAS: Primary | ICD-10-CM

## 2024-06-07 NOTE — TELEPHONE ENCOUNTER
Spoke to patient to schedule procedure(s) Upper Endoscopy Ultrasound (EUS)       Physician to perform procedure(s) Dr. SHELLY Wood  Date of Procedure (s) 06/10/2024  Arrival Time 10:00 AM  Time of Procedure(s) 11:00 AM   Location of Procedure(s) [facility] [address] [facility]  Type of Rx Prep sent to patient: Other  Instructions provided to patient via [facility]    Patient was informed on the following information and verbalized understanding. Screening questionnaire reviewed with patient and complete. If procedure requires anesthesia, a responsible adult needs to be present to accompany the patient home, patient cannot drive after receiving anesthesia. Appointment details are tentative, especially check-in time. Patient will receive a prep-op call 7 days prior to confirm check-in time for procedure. If applicable the patient should contact their pharmacy to verify Rx for procedure prep is ready for pick-up. Patient was advised to call the scheduling department at [phone] if pharmacy states no Rx is available. Patient was advised to call the endoscopy scheduling department if any questions or concerns arise.      [facility] Scheduling Department

## 2024-06-07 NOTE — TELEPHONE ENCOUNTER
Dear Provider,    Patient has a scheduled procedure Upper Endoscopy Ultrasound (EUS) on 06/10/2024 and is currently taking a blood thinner prescribed by your office. In order to ensure patient safety, we would like to confirm that the patient can place their blood thinner medication on hold for the procedure. Can he/she discontinue Eliquis (apixaban) for a minimum of 2 days prior to the procedure?     Thank you for your prompt reply.    [facility]oscopy Scheduling

## 2024-06-07 NOTE — TELEPHONE ENCOUNTER
Spoke to pts son in law to reschedule procedure(s) Upper Endoscopy Ultrasound (EUS)       Physician to perform procedure(s) Dr. SHELLY Wood  Date of Procedure (s) 6/11/24  Arrival Time 12:15 PM  Time of Procedure(s) 1:15 PM   Location of Procedure(s) [facility] [address] Floor  Type of Rx Prep sent to patient: N/A  Instructions provided to patient via [facility]    Patient was informed on the following information and verbalized understanding. Screening questionnaire reviewed with patient and complete. If procedure requires anesthesia, a responsible adult needs to be present to accompany the patient home, patient cannot drive after receiving anesthesia. Appointment details are tentative, especially check-in time. Patient will receive a prep-op call 7 days prior to confirm check-in time for procedure. If applicable the patient should contact their pharmacy to verify Rx for procedure prep is ready for pick-up. Patient was advised to call the scheduling department at [phone] if pharmacy states no Rx is available. Patient was advised to call the endoscopy scheduling department if any questions or concerns arise.      [facility] Scheduling Department

## 2024-06-07 NOTE — TELEPHONE ENCOUNTER
Patients son calling to reschedule father's EUS. Patient will not have transportation on 6/10/2024. Message sent to [employer]. Phone number also given to patients son to call.

## 2024-06-10 ENCOUNTER — TELEPHONE (OUTPATIENT)
Dept: ELECTROPHYSIOLOGY | Facility: CLINIC | Age: 69
End: 2024-06-10
Payer: MEDICARE

## 2024-06-10 ENCOUNTER — TELEPHONE (OUTPATIENT)
Dept: ENDOSCOPY | Facility: HOSPITAL | Age: 69
End: 2024-06-10
Payer: MEDICARE

## 2024-06-10 ENCOUNTER — PATIENT MESSAGE (OUTPATIENT)
Dept: INTERNAL MEDICINE | Facility: CLINIC | Age: 69
End: 2024-06-10
Payer: MEDICARE

## 2024-06-10 DIAGNOSIS — I48.0 PAROXYSMAL ATRIAL FIBRILLATION: Primary | ICD-10-CM

## 2024-06-10 NOTE — TELEPHONE ENCOUNTER
Contacted Pt for endoscopy pre-call for upcoming procedure.  Pre-call complete, Pt does not have any further questions. Arrival time: 12:15PM

## 2024-06-10 NOTE — TELEPHONE ENCOUNTER
----- Message from Sharon Cortez RN sent at 6/7/2024  4:45 PM CDT -----  Left a message for daughter, no call back received. Will try again on Monday.  ----- Message -----  From: Trae Angel MD  Sent: 6/6/2024   5:58 PM CDT  To: Claribel Corrales RN    Stop amiodarone and in 8 weeks start multaq  ----- Message -----  From: Claribel Corrales RN  Sent: 6/6/2024   4:10 PM CDT  To: Trae Angel MD    Daughter requested that we try to get the Multaq approved for her dad so that he can been switched from amiodarone to Multaq. I completed PA and it was approved. Please provide instruction for transition. Daughter will contact the office if the prescription is unaffordable. If so would like to change to Sotalol as you mentioned would be the other alternative.

## 2024-06-11 ENCOUNTER — ANESTHESIA (OUTPATIENT)
Dept: ENDOSCOPY | Facility: HOSPITAL | Age: 69
End: 2024-06-11
Payer: MEDICARE

## 2024-06-11 ENCOUNTER — HOSPITAL ENCOUNTER (OUTPATIENT)
Facility: HOSPITAL | Age: 69
Discharge: HOME | End: 2024-06-11
Attending: INTERNAL MEDICINE | Admitting: INTERNAL MEDICINE
Payer: MEDICARE

## 2024-06-11 ENCOUNTER — ANESTHESIA EVENT (OUTPATIENT)
Dept: ENDOSCOPY | Facility: HOSPITAL | Age: 69
End: 2024-06-11
Payer: MEDICARE

## 2024-06-11 VITALS
BODY MASS INDEX: 28.93 KG/M2 | WEIGHT: 180 LBS | OXYGEN SATURATION: 97 % | DIASTOLIC BLOOD PRESSURE: 77 MMHG | HEIGHT: 66 IN | RESPIRATION RATE: 16 BRPM | SYSTOLIC BLOOD PRESSURE: 106 MMHG | TEMPERATURE: 99 F | HEART RATE: 97 BPM

## 2024-06-11 DIAGNOSIS — K86.2 PANCREAS CYST: ICD-10-CM

## 2024-06-11 LAB — GLUCOSE SERPL-MCNC: 110 MG/DL (ref 70–110)

## 2024-06-11 PROCEDURE — 43259 EGD US EXAM DUODENUM/JEJUNUM: CPT | Mod: HCNC | Performed by: INTERNAL MEDICINE

## 2024-06-11 PROCEDURE — 25000003 PHARM REV CODE 250: Mod: HCNC | Performed by: NURSE ANESTHETIST, CERTIFIED REGISTERED

## 2024-06-11 PROCEDURE — 37000008 HC ANESTHESIA 1ST 15 MINUTES: Mod: HCNC | Performed by: INTERNAL MEDICINE

## 2024-06-11 PROCEDURE — D9220A PRA ANESTHESIA: Mod: ANES,,, | Performed by: STUDENT IN AN ORGANIZED HEALTH CARE EDUCATION/TRAINING PROGRAM

## 2024-06-11 PROCEDURE — 82962 GLUCOSE BLOOD TEST: CPT | Mod: HCNC | Performed by: INTERNAL MEDICINE

## 2024-06-11 PROCEDURE — 63600175 PHARM REV CODE 636 W HCPCS: Mod: HCNC | Performed by: NURSE ANESTHETIST, CERTIFIED REGISTERED

## 2024-06-11 PROCEDURE — D9220A PRA ANESTHESIA: Mod: CRNA,,, | Performed by: NURSE ANESTHETIST, CERTIFIED REGISTERED

## 2024-06-11 PROCEDURE — 25000003 PHARM REV CODE 250: Mod: HCNC | Performed by: INTERNAL MEDICINE

## 2024-06-11 PROCEDURE — 37000009 HC ANESTHESIA EA ADD 15 MINS: Mod: HCNC | Performed by: INTERNAL MEDICINE

## 2024-06-11 PROCEDURE — 43259 EGD US EXAM DUODENUM/JEJUNUM: CPT | Mod: HCNC,,, | Performed by: INTERNAL MEDICINE

## 2024-06-11 RX ORDER — SODIUM CHLORIDE 9 MG/ML
INJECTION, SOLUTION INTRAVENOUS CONTINUOUS
Status: DISCONTINUED | OUTPATIENT
Start: 2024-06-11 | End: 2024-06-11 | Stop reason: HOSPADM

## 2024-06-11 RX ORDER — LIDOCAINE HYDROCHLORIDE 20 MG/ML
INJECTION, SOLUTION EPIDURAL; INFILTRATION; INTRACAUDAL; PERINEURAL
Status: DISCONTINUED | OUTPATIENT
Start: 2024-06-11 | End: 2024-06-11

## 2024-06-11 RX ORDER — SODIUM CHLORIDE 0.9 % (FLUSH) 0.9 %
10 SYRINGE (ML) INJECTION
Status: DISCONTINUED | OUTPATIENT
Start: 2024-06-11 | End: 2024-06-11 | Stop reason: HOSPADM

## 2024-06-11 RX ORDER — PROPOFOL 10 MG/ML
VIAL (ML) INTRAVENOUS
Status: DISCONTINUED | OUTPATIENT
Start: 2024-06-11 | End: 2024-06-11

## 2024-06-11 RX ORDER — ETOMIDATE 2 MG/ML
INJECTION INTRAVENOUS
Status: DISCONTINUED | OUTPATIENT
Start: 2024-06-11 | End: 2024-06-11

## 2024-06-11 RX ORDER — PROPOFOL 10 MG/ML
VIAL (ML) INTRAVENOUS CONTINUOUS PRN
Status: DISCONTINUED | OUTPATIENT
Start: 2024-06-11 | End: 2024-06-11

## 2024-06-11 RX ADMIN — Medication 30 MG: at 01:06

## 2024-06-11 RX ADMIN — LIDOCAINE HYDROCHLORIDE 80 MG: 20 INJECTION, SOLUTION EPIDURAL; INFILTRATION; INTRACAUDAL; PERINEURAL at 01:06

## 2024-06-11 RX ADMIN — ETOMIDATE 6 MG: 2 INJECTION, SOLUTION INTRAVENOUS at 01:06

## 2024-06-11 RX ADMIN — LIDOCAINE HYDROCHLORIDE 20 MG: 20 INJECTION, SOLUTION EPIDURAL; INFILTRATION; INTRACAUDAL; PERINEURAL at 01:06

## 2024-06-11 RX ADMIN — PROPOFOL 150 MCG/KG/MIN: 10 INJECTION, EMULSION INTRAVENOUS at 01:06

## 2024-06-11 RX ADMIN — TOPICAL ANESTHETIC 1 EACH: 200 SPRAY DENTAL; PERIODONTAL at 01:06

## 2024-06-11 RX ADMIN — SODIUM CHLORIDE: 9 INJECTION, SOLUTION INTRAVENOUS at 01:06

## 2024-06-11 RX ADMIN — Medication 20 MG: at 01:06

## 2024-06-11 NOTE — H&P
Short Stay Endoscopy History and Physical    PCP - Bacilio Larry MD  Referring Physician - Mode Wood MD  [address]  [address]  [place] [address]    Procedure - eus  ASA - per anesthesia  Mallampati - per anesthesia  History of Anesthesia problems - no  Family history Anesthesia problems -  no   Plan of anesthesia - General    HPI:  This is a 68 y.o. male here for evaluation of: pancreas lesion    Reflux - no  Dysphagia - no  Abdominal pain - no  Diarrhea - no    ROS:  Constitutional: No fevers, chills, No weight loss  CV: No chest pain  Pulm: No cough, No shortness of breath  Ophtho: No vision changes  GI: see HPI  Derm: No rash    Medical History:  has a past medical history of A-fib, Alcohol abuse, Anxiety, Arthritis, Chronic gout, Diabetes mellitus, DM (diabetes mellitus) (11/16/2016), Fatty liver, Hyperlipidemia, Renal cell cancer (2014), and S/P TKR (total knee replacement), right (06/27/2019).    Surgical History:  has a past surgical history that includes Abscess drainage; Hand surgery (Left); Partial nephrectomy (Left, August 2014); Colonoscopy; Kidney surgery; Percutaneous cryotherapy of peripheral nerve using liquid nitrous oxide in closed needle device (Right, 6/17/2019); Total knee arthroplasty (Right, 6/27/2019); and Joint replacement.    Family History: family history includes Alcohol abuse in his brother; Alzheimer's disease in his brother and mother; Cancer in his father and sister; Colon cancer in his father; Colon polyps in his brother; Diabetes in his mother; Lung cancer in his father and sister..    Social History:  reports that he has never smoked. He has never used smokeless tobacco. He reports current alcohol use of about 168.0 standard drinks of alcohol per week. He reports that he does not use drugs.    Review of patient's allergies indicates:   Allergen Reactions    No known drug allergies        Medications:   Facility-Administered Medications Prior to Admission    Medication Dose Route Frequency Provider Last Rate Last Admin    triamcinolone acetonide injection 40 mg  40 mg Intramuscular 1 time in Clinic/HOD          Medications Prior to Admission   Medication Sig Dispense Refill Last Dose    allopurinoL (ZYLOPRIM) 100 MG tablet TAKE 2 TABLETS(200 MG) BY MOUTH EVERY  tablet 0 6/10/2024    cyanocobalamin (VITAMIN B-12) 1000 MCG tablet Take 1 tablet (1,000 mcg total) by mouth once daily.   6/10/2024    gabapentin (NEURONTIN) 300 MG capsule Take 1 capsule (300 mg total) by mouth 2 (two) times daily. 60 capsule 1 6/11/2024    HYDROcodone-acetaminophen (NORCO) 5-325 mg per tablet Take 1 tablet by mouth every 12 (twelve) hours as needed for Pain. 30 tablet 0 6/11/2024    metoprolol succinate (TOPROL-XL) 25 MG 24 hr tablet Take 1 tablet (25 mg total) by mouth once daily. 90 tablet 3 6/11/2024    pantoprazole (PROTONIX) 40 MG tablet Take 1 tablet (40 mg total) by mouth once daily. 90 tablet 3 6/10/2024    rosuvastatin (CRESTOR) 20 MG tablet TAKE 1 TABLET(20 MG) BY MOUTH EVERY DAY 90 tablet 3 6/11/2024    sertraline (ZOLOFT) 100 MG tablet Take 1 tablet (100 mg total) by mouth once daily. 90 tablet 3 Past Month    tamsulosin (FLOMAX) 0.4 mg Cap TAKE 1 CAPSULE(0.4 MG) BY MOUTH EVERY DAY (Patient taking differently: Take 0.4 mg by mouth every evening.) 90 capsule 3 6/10/2024    diclofenac sodium (VOLTAREN) 1 % Gel Apply 2 g topically 3 (three) times daily as needed (Right knee - hand pain).       [START ON 8/6/2024] dronedarone (MULTAQ) 400 mg Tab Take 1 tablet (400 mg total) by mouth 2 (two) times daily with meals. 60 tablet 11     ELIQUIS 5 mg Tab TAKE 1 TABLET BY MOUTH TWICE DAILY (Patient taking differently: Take 5 mg by mouth 2 (two) times daily.) 180 tablet 3 6/8/2024    folic acid (FOLVITE) 1 MG tablet Take 1 tablet (1 mg total) by mouth once daily. 30 tablet 11     ketoconazole (NIZORAL) 2 % cream Apply topically once daily. Apply to affected skin from toes to heels  twice a day for 3-4 weeks. 60 g 6     magnesium oxide (MAG-OX) 400 mg (241.3 mg magnesium) tablet Take 1 tablet (400 mg total) by mouth once daily. 14 tablet 0 6/8/2024    metFORMIN (GLUCOPHAGE-XR) 500 MG ER 24hr tablet Take 2 tablets (1,000 mg total) by mouth 2 (two) times daily with meals. 360 tablet 3 6/9/2024    multivitamin (THERAGRAN) tablet Take 1 tablet by mouth once daily.   6/9/2024    omega-3 acid ethyl esters (LOVAZA) 1 gram capsule Take 2 g by mouth 2 (two) times daily.   More than a month    rOPINIRole (REQUIP) 1 MG tablet TAKE 1 TABLET(1 MG) BY MOUTH EVERY EVENING 90 tablet 3 6/9/2024    VITAMIN B-1 100 MG tablet TAKE 1 TABLET BY MOUTH ONCE DAILY 30 tablet 2        Physical Exam:    Vital Signs:   Vitals:    06/11/24 1323   BP: 125/76   Pulse: 87   Resp: 18   Temp: 97.7 °F (36.5 °C)       General Appearance: Well appearing in no acute distress    Labs:  Lab Results   Component Value Date    WBC 5.54 05/02/2024    HGB 13.7 (L) 05/02/2024    HCT 46 05/02/2024     05/02/2024    CHOL 123 11/11/2022    TRIG 213 (H) 11/11/2022    HDL 43 11/11/2022    ALT 39 05/02/2024    AST 67 (H) 05/02/2024     (L) 05/02/2024    K 5.0 05/02/2024    CL 98 05/02/2024    CREATININE 1.2 05/02/2024    BUN 10 05/02/2024    CO2 22 (L) 05/02/2024    TSH 2.089 10/29/2023    PSA 3.8 08/09/2017    INR 1.0 10/28/2023    GLUF 122 (H) 04/21/2010    HGBA1C 5.9 (H) 02/29/2024       I have explained the risks and benefits of this endoscopic procedure to the patient including but not limited to bleeding, inflammation, infection, perforation, and death.      Mode Wood MD

## 2024-06-11 NOTE — PROVATION PATIENT INSTRUCTIONS
Discharge Summary/Instructions after an Endoscopic Procedure  Patient Name: Donald Abadie  Patient MRN: [id number]  Patient YOB: 1955 Tuesday, June 11, 2024  Mode Wood MD  Dear patient,  As a result of recent federal legislation (The Federal Cures Act), you may   receive lab or pathology results from your procedure in your [facility]   account before your physician is able to contact you. Your physician or   their representative will relay the results to you with their   recommendations at their soonest availability.  Thank you,  Your health is very important to us during the Covid Crisis. Following your   procedure today, you will receive a daily text for 2 weeks asking about   signs or symptoms of Covid 19.  Please respond to this text when you   receive it so we can follow up and keep you as safe as possible.   RESTRICTIONS:  During your procedure today, you received medications for sedation.  These   medications may affect your judgment, balance and coordination.  Therefore,   for 24 hours, you have the following restrictions:   - DO NOT drive a car, operate machinery, make legal/financial decisions,   sign important papers or drink alcohol.    ACTIVITY:  Today: no heavy lifting, straining or running due to procedural   sedation/anesthesia.  The following day: return to full activity including work.  DIET:  Eat and drink normally unless instructed otherwise.     TREATMENT FOR COMMON SIDE EFFECTS:  - Mild abdominal pain, nausea, belching, bloating or excessive gas:  rest,   eat lightly and use a heating pad.  - Sore Throat: treat with throat lozenges and/or gargle with warm salt   water.  - Because air was used during the procedure, expelling large amounts of air   from your rectum or belching is normal.  - If a bowel prep was taken, you may not have a bowel movement for 1-3 days.    This is normal.  SYMPTOMS TO WATCH FOR AND REPORT TO YOUR PHYSICIAN:  1. Abdominal pain or bloating, other than gas  cramps.  2. Chest pain.  3. Back pain.  4. Signs of infection such as: chills or fever occurring within 24 hours   after the procedure.  5. Rectal bleeding, which would show as bright red, maroon, or black stools.   (A tablespoon of blood from the rectum is not serious, especially if   hemorrhoids are present.)  6. Vomiting.  7. Weakness or dizziness.  GO DIRECTLY TO THE NEAREST EMERGENCY ROOM IF YOU HAVE ANY OF THE FOLLOWING:      Difficulty breathing              Chills and/or fever over 101 F   Persistent vomiting and/or vomiting blood   Severe abdominal pain   Severe chest pain   Black, tarry stools   Bleeding- more than one tablespoon   Any other symptom or condition that you feel may need urgent attention  Your doctor recommends these additional instructions:  If any biopsies were taken, your doctors clinic will contact you in 1 to 2   weeks with any results.  - Discharge patient to home.   - Resume previous diet.   - Continue present medications.   - Perform magnetic resonance imaging (MRI) with gadolinium in 1 year.   - Return to pancreas cyst clinic (virtual visit) in 6 months.  For questions, problems or results please call your physician - Mode Wood MD.  EMERGENCY PHONE NUMBER: [phone],  LAB RESULTS: [phone]  IF A COMPLICATION OR EMERGENCY SITUATION ARISES AND YOU ARE UNABLE TO REACH   YOUR PHYSICIAN - GO DIRECTLY TO THE EMERGENCY ROOM.  Mode Wood MD  6/11/2024 2:05:50 PM  This report has been verified and signed electronically.  Dear patient,  As a result of recent federal legislation (The Federal Cures Act), you may   receive lab or pathology results from your procedure in your [facility]   account before your physician is able to contact you. Your physician or   their representative will relay the results to you with their   recommendations at their soonest availability.  Thank you,  [employer]ATION

## 2024-06-11 NOTE — TRANSFER OF CARE
"Anesthesia Transfer of Care Note    Patient: Donald Warren Abadie    Procedure(s) Performed: Procedure(s) (LRB):  ULTRASOUND, UPPER GI TRACT, ENDOSCOPIC (N/A)    Patient location: GI    Anesthesia Type: general    Transport from OR: Transported from OR on room air with adequate spontaneous ventilation    Post pain: adequate analgesia    Post assessment: no apparent anesthetic complications    Post vital signs: stable    Level of consciousness: awake    Nausea/Vomiting: no nausea/vomiting    Complications: none    Transfer of care protocol was followed      Last vitals: Visit Vitals  /76 (BP Location: Left arm, Patient Position: Lying)   Pulse 87   Temp 36.5 °C (97.7 °F) (Temporal)   Resp 18   Ht 5' 6" (1.676 m)   Wt 81.6 kg (180 lb)   SpO2 98%   BMI 29.05 kg/m²     "

## 2024-06-11 NOTE — ANESTHESIA POSTPROCEDURE EVALUATION
Anesthesia Post Evaluation    Patient: Donald Warren Abadie    Procedure(s) Performed: Procedure(s) (LRB):  ULTRASOUND, UPPER GI TRACT, ENDOSCOPIC (N/A)    Final Anesthesia Type: general      Patient location during evaluation: GI PACU  Patient participation: Yes- Able to Participate  Level of consciousness: awake and alert, oriented and awake  Post-procedure vital signs: reviewed and stable  Pain management: adequate  Airway patency: patent  NANCY mitigation strategies: Multimodal analgesia  PONV status at discharge: No PONV  Anesthetic complications: no      Cardiovascular status: blood pressure returned to baseline and hemodynamically stable  Respiratory status: unassisted and spontaneous ventilation  Hydration status: euvolemic  Follow-up not needed.              Vitals Value Taken Time   /77 06/11/24 1435   Temp 37 °C (98.6 °F) 06/11/24 1405   Pulse 97 06/11/24 1435   Resp 16 06/11/24 1435   SpO2 97 % 06/11/24 1435         Event Time   Out of Recovery 14:35:00         Pain/French Score: French Score: 10 (6/11/2024  2:35 PM)

## 2024-06-11 NOTE — ANESTHESIA PREPROCEDURE EVALUATION
06/11/2024  [facility]  Anesthesia Pre-Operative Evaluation         Patient Name: Donald Warren Abadie  YOB: 1955  MRN: [id number]    SUBJECTIVE:     Pre-operative evaluation for Procedure(s) (LRB):  ULTRASOUND, UPPER GI TRACT, ENDOSCOPIC (N/A)     06/11/2024    Donald Warren Abadie is a 68 y.o. male w/ a significant PMHx of HFrEF (45%), afib, RCC.  Patient now presents for the above procedure(s).    TTE:   The left ventricle is normal in size with mildly decreased systolic function. The estimated ejection fraction is 45-50%.  There is mild left ventricular global hypokinesis.  Normal right ventricular size with normal right ventricular systolic function.  Grade I left ventricular diastolic dysfunction.  The estimated PA systolic pressure is 33 mmHg.  Normal central venous pressure (3 mmHg).    Patient Active Problem List   Diagnosis    Hyperlipidemia associated with type 2 diabetes mellitus    Idiopathic chronic gout of multiple sites without tophus    Atrial fibrillation with RVR    Alcohol withdrawal    Overweight (BMI 25.0-29.9)    Metabolic dysfunction-associated steatotic liver disease (MASLD)    Renal cell cancer    Hemorrhoids    Personal history of kidney cancer    Primary osteoarthritis of right knee    Hyponatremia    Alcohol use disorder, severe, in controlled environment    Atrial flutter    Substance induced mood disorder    GERD (gastroesophageal reflux disease)    Alcohol use disorder, moderate, dependence    Chronic pain of right knee    Type 2 diabetes mellitus without complication, without long-term current use of insulin    Aortic atherosclerosis    Primary osteoarthritis involving multiple joints    Generalized anxiety disorder    Chronic diastolic congestive heart failure    Metabolic acidosis    Hypomagnesemia    Recurrent major depressive disorder, in  LACE

 

Length of stay for            Answers:  4-6 days                              

current admission                                                             

Acuity / Level of             Answers:  Yes                                   

Care: Did the patient                                                         

have an inpatient                                                             

admission?                                                                    

# of Emergency department     Answers:  0                                     

visits in the last 6                                                          

months                                                                        

Score: 7

 

Date Signed:  03/20/2019 11:10 AM

Electronically Signed By:BENEDICT Naylor partial remission    Bronchitis    Testicular pain, right    Benign prostatic hyperplasia with nocturia    JASS (acute kidney injury)       Review of patient's allergies indicates:   Allergen Reactions    No known drug allergies        Current Inpatient Medications:      No current facility-administered medications on file prior to encounter.     Current Outpatient Medications on File Prior to Encounter   Medication Sig Dispense Refill    allopurinoL (ZYLOPRIM) 100 MG tablet TAKE 2 TABLETS(200 MG) BY MOUTH EVERY  tablet 0    cyanocobalamin (VITAMIN B-12) 1000 MCG tablet Take 1 tablet (1,000 mcg total) by mouth once daily.      diclofenac sodium (VOLTAREN) 1 % Gel Apply 2 g topically 3 (three) times daily as needed (Right knee - hand pain).      ELIQUIS 5 mg Tab TAKE 1 TABLET BY MOUTH TWICE DAILY (Patient taking differently: Take 5 mg by mouth 2 (two) times daily.) 180 tablet 3    folic acid (FOLVITE) 1 MG tablet Take 1 tablet (1 mg total) by mouth once daily. 30 tablet 11    gabapentin (NEURONTIN) 300 MG capsule Take 1 capsule (300 mg total) by mouth 2 (two) times daily. 60 capsule 1    HYDROcodone-acetaminophen (NORCO) 5-325 mg per tablet Take 1 tablet by mouth every 12 (twelve) hours as needed for Pain. 30 tablet 0    ketoconazole (NIZORAL) 2 % cream Apply topically once daily. Apply to affected skin from toes to heels twice a day for 3-4 weeks. 60 g 6    magnesium oxide (MAG-OX) 400 mg (241.3 mg magnesium) tablet Take 1 tablet (400 mg total) by mouth once daily. 14 tablet 0    metFORMIN (GLUCOPHAGE-XR) 500 MG ER 24hr tablet Take 2 tablets (1,000 mg total) by mouth 2 (two) times daily with meals. 360 tablet 3    metoprolol succinate (TOPROL-XL) 25 MG 24 hr tablet Take 1 tablet (25 mg total) by mouth once daily. 90 tablet 3    multivitamin (THERAGRAN) tablet Take 1 tablet by mouth once daily.      omega-3 acid ethyl esters (LOVAZA) 1 gram capsule Take 2 g by mouth 2 (two) times daily.      pantoprazole  (PROTONIX) 40 MG tablet Take 1 tablet (40 mg total) by mouth once daily. 90 tablet 3    rOPINIRole (REQUIP) 1 MG tablet TAKE 1 TABLET(1 MG) BY MOUTH EVERY EVENING 90 tablet 3    rosuvastatin (CRESTOR) 20 MG tablet TAKE 1 TABLET(20 MG) BY MOUTH EVERY DAY 90 tablet 3    sertraline (ZOLOFT) 100 MG tablet Take 1 tablet (100 mg total) by mouth once daily. 90 tablet 3    tamsulosin (FLOMAX) 0.4 mg Cap TAKE 1 CAPSULE(0.4 MG) BY MOUTH EVERY DAY (Patient taking differently: Take 0.4 mg by mouth every evening.) 90 capsule 3    VITAMIN B-1 100 MG tablet TAKE 1 TABLET BY MOUTH ONCE DAILY 30 tablet 2       Past Surgical History:   Procedure Laterality Date    ABSCESS DRAINAGE      perirectal    COLONOSCOPY      HAND SURGERY Left     JOINT REPLACEMENT      knee replacement right knee    KIDNEY SURGERY      partial left kidney removal - [place]    PARTIAL NEPHRECTOMY Left August 2014    PERCUTANEOUS CRYOTHERAPY OF PERIPHERAL NERVE USING LIQUID NITROUS OXIDE IN CLOSED NEEDLE DEVICE Right 6/17/2019    Procedure: CRYOTHERAPY, NERVE, PERIPHERAL, PERCUTANEOUS, USING LIQUID NITROUS OXIDE IN CLOSED NEEDLE DEVICE-right knee iovera;  Surgeon: Donny Hair III, MD;  Location: [facility] CATH [facility];  Service: Pain Management;  Laterality: Right;    TOTAL KNEE ARTHROPLASTY Right 6/27/2019    Procedure: ARTHROPLASTY, KNEE, TOTAL-SAME DAY;  Surgeon: Mikael Huerta MD;  Location: [facility] OR [address] [place] [address];  Service: Orthopedics;  Laterality: Right;       OBJECTIVE:     Vital Signs Range (Last 24H):         Significant Labs:  Lab Results   Component Value Date    WBC 5.54 05/02/2024    HGB 13.7 (L) 05/02/2024    HCT 46 05/02/2024     05/02/2024    CHOL 123 11/11/2022    TRIG 213 (H) 11/11/2022    HDL 43 11/11/2022    ALT 39 05/02/2024    AST 67 (H) 05/02/2024     (L) 05/02/2024    K 5.0 05/02/2024    CL 98 05/02/2024    CREATININE 1.2 05/02/2024    BUN 10 05/02/2024    CO2 22 (L) 05/02/2024    TSH 2.089 10/29/2023    PSA 3.8 08/09/2017     INR 1.0 10/28/2023    GLUF 122 (H) 04/21/2010    HGBA1C 5.9 (H) 02/29/2024       Diagnostic Studies: No relevant studies.    EKG:   Results for orders placed or performed during the hospital encounter of 05/02/24   EKG 12-lead    Collection Time: 05/02/24 12:00 AM    Narrative    Ordered by an unspecified provider.       ASSESSMENT/PLAN:           Pre-op Assessment    I have reviewed the Patient Summary Reports.     I have reviewed the Nursing Notes. I have reviewed the NPO Status.   I have reviewed the Medications.     Review of Systems  Anesthesia Hx:               Denies Personal Hx of Anesthesia complications.                    Hematology/Oncology:                      Current/Recent Cancer.                Cardiovascular:            CHF                                 Renal/:  Chronic Renal Disease, CKD                Hepatic/GI:      Liver Disease,            Musculoskeletal:  Arthritis               Endocrine:  Diabetes           Psych:  Psychiatric History                  Physical Exam  General: Cooperative, Alert and Oriented    Airway:  Mallampati: III   Mouth Opening: Normal  TM Distance: Normal  Tongue: Normal  Neck ROM: Normal ROM        Anesthesia Plan  Type of Anesthesia, risks & benefits discussed:    Anesthesia Type: Gen Natural Airway  Intra-op Monitoring Plan: Standard ASA Monitors  Post Op Pain Control Plan: multimodal analgesia  Induction:  IV  Informed Consent: Informed consent signed with the Patient and all parties understand the risks and agree with anesthesia plan.  All questions answered.   ASA Score: 3  Day of Surgery Review of History & Physical: H&P Update referred to the surgeon/provider.    Ready For Surgery From Anesthesia Perspective.     .

## 2024-06-12 LAB — POCT GLUCOSE: 110 MG/DL (ref 70–110)

## 2024-06-13 NOTE — PROGRESS NOTES
[facility] - established clinic visit Patient Evaluation     Referred by: No ref. provider found   Reason for referral: * No diagnoses found *     CC:   No chief complaint on file.        4/29/2024    10:47 AM   Last 3 PDI Scores   Pain Disability Index (PDI) 70       Interval Update 06/14/2024: Patient return to clinic for follow-up for right testicular pain that radiates into his abdomen.  He is status post a  Right Ilioinguinal Nerve Block with Ultrasound on 05/17/2024 reporting 40-50% relief for 3 weeks and then pain slowly returned.  He was previously prescribed gabapentin renal dose of 300 mg b.i.d. which he states he stopped taking.  He also receives hydrocodone from his PCP he takes half a pill in the morning and half a pill at night which helps him get through the day as well as sleep.  Denies any new pain denies profound weakness denies any bowel bladder dysfunction at this time.    Subjective 04/29/2024:   Donald Warren Abadie is a 68 y.o. male who presents complaining of 4 weeks of right testicular pain.  Pain radiates to the abdomen.  He was noted to have a right epididymal head cyst on ultrasound.  He also has a right inguinal hernia on ultrasound.  He has been seen by Urology and was recommended to take NSAIDs, apply warm compresses and wear supportive underwear.  Patient was unable to take NSAIDs due to concurrent blood thinner use.  He was being prescribed [place] by his PCP.  At his most recent urology visit on 04/24/2024, he was noted to have a positive urine sample for UTI.  He was prescribed Bactrim x7 days.    Initial Pain Assessment:  Pain Assessment  Pain Assessment: 0-10  Pain Score: 10-Worst pain ever  Pain Location: Groin (Right Testicular Pain)  Pain Descriptors: Constant, Discomfort  Pain Frequency: Continuous  Pain Onset: 4 weeks  Clinical Progression: Gradually worsening  Aggravating Factors: Bending, Stretching, Straightening, Exercise, Kneeling,  Standing  Result of Injury: No  Work-Related Injury: No  Patient's Stated Pain Goal: No pain  Pain Intervention(s): Medication (See eMAR)     Patient denies night fever/night sweats, significant weight loss, significant motor weakness, and loss of sensations.      Previous Interventions:  - 05/17/2024 Right Ilioinguinal Nerve Block with Ultrasound 40 50% relief for 3 weeks       Previous Therapies:  PT/OT: no   Chiropractor:   HEP:   Relevant Surgery: no   Previous Medications:   - NSAIDS: Avoid due to to  blood thinners  - Muscle Relaxants:    - TCAs:   - SNRIs:   - Topicals:   - Anticonvulsants:    - Opioids: Norco 5/325  - Adjuvants:            - Chronic use of Alprazolam     Current Pain Medications:  Norco    Chronic Alprazolam use    Review of Systems:  ROS    GENERAL:  No weight loss, malaise or fevers.  HEENT:   No recent changes in vision or hearing  NECK:  No difficulty with swallowing. No stridor.   RESPIRATORY:  Negative for cough, wheezing or shortness of breath, patient denies any recent URI.  CARDIOVASCULAR:  Negative for chest pain, leg swelling or palpitations.  GI:  Negative for abdominal discomfort, blood in stools or black stools or change in bowel habits.  MUSCULOSKELETAL:  See HPI.  SKIN:  Negative for lesions, rash, and itching.  PSYCH:  No mood disorder or recent psychosocial stressors.    HEMATOLOGY/LYMPHOLOGY:  Negative for prolonged bleeding, bruising easily or swollen nodes.  Patient is not currently taking any anti-coagulants  NEURO:   No history of headaches, syncope, paralysis, seizures or tremors.  All other reviewed and negative other than HPI.    History:  Current medications, allergies, medical history, surgical history,   family history, and social history were reviewed in the chart as marked.    Full Medication List:    Current Outpatient Medications:     allopurinoL (ZYLOPRIM) 100 MG tablet, TAKE 2 TABLETS(200 MG) BY MOUTH EVERY DAY, Disp: 180 tablet, Rfl: 0    cyanocobalamin  (VITAMIN B-12) 1000 MCG tablet, Take 1 tablet (1,000 mcg total) by mouth once daily., Disp: , Rfl:     diclofenac sodium (VOLTAREN) 1 % Gel, Apply 2 g topically 3 (three) times daily as needed (Right knee - hand pain)., Disp: , Rfl:     [START ON 8/6/2024] dronedarone (MULTAQ) 400 mg Tab, Take 1 tablet (400 mg total) by mouth 2 (two) times daily with meals., Disp: 60 tablet, Rfl: 11    ELIQUIS 5 mg Tab, TAKE 1 TABLET BY MOUTH TWICE DAILY (Patient taking differently: Take 5 mg by mouth 2 (two) times daily.), Disp: 180 tablet, Rfl: 3    folic acid (FOLVITE) 1 MG tablet, Take 1 tablet (1 mg total) by mouth once daily., Disp: 30 tablet, Rfl: 11    gabapentin (NEURONTIN) 300 MG capsule, Take 1 capsule (300 mg total) by mouth 2 (two) times daily., Disp: 60 capsule, Rfl: 1    HYDROcodone-acetaminophen (NORCO) 5-325 mg per tablet, Take 1 tablet by mouth every 12 (twelve) hours as needed for Pain., Disp: 30 tablet, Rfl: 0    ketoconazole (NIZORAL) 2 % cream, Apply topically once daily. Apply to affected skin from toes to heels twice a day for 3-4 weeks., Disp: 60 g, Rfl: 6    magnesium oxide (MAG-OX) 400 mg (241.3 mg magnesium) tablet, Take 1 tablet (400 mg total) by mouth once daily., Disp: 14 tablet, Rfl: 0    metFORMIN (GLUCOPHAGE-XR) 500 MG ER 24hr tablet, Take 2 tablets (1,000 mg total) by mouth 2 (two) times daily with meals., Disp: 360 tablet, Rfl: 3    metoprolol succinate (TOPROL-XL) 25 MG 24 hr tablet, Take 1 tablet (25 mg total) by mouth once daily., Disp: 90 tablet, Rfl: 3    multivitamin (THERAGRAN) tablet, Take 1 tablet by mouth once daily., Disp: , Rfl:     omega-3 acid ethyl esters (LOVAZA) 1 gram capsule, Take 2 g by mouth 2 (two) times daily., Disp: , Rfl:     pantoprazole (PROTONIX) 40 MG tablet, Take 1 tablet (40 mg total) by mouth once daily., Disp: 90 tablet, Rfl: 3    rOPINIRole (REQUIP) 1 MG tablet, TAKE 1 TABLET(1 MG) BY MOUTH EVERY EVENING, Disp: 90 tablet, Rfl: 3    rosuvastatin (CRESTOR) 20 MG  "tablet, TAKE 1 TABLET(20 MG) BY MOUTH EVERY DAY, Disp: 90 tablet, Rfl: 3    sertraline (ZOLOFT) 100 MG tablet, Take 1 tablet (100 mg total) by mouth once daily., Disp: 90 tablet, Rfl: 3    tamsulosin (FLOMAX) 0.4 mg Cap, TAKE 1 CAPSULE(0.4 MG) BY MOUTH EVERY DAY (Patient taking differently: Take 0.4 mg by mouth every evening.), Disp: 90 capsule, Rfl: 3    VITAMIN B-1 100 MG tablet, TAKE 1 TABLET BY MOUTH ONCE DAILY, Disp: 30 tablet, Rfl: 2    Current Facility-Administered Medications:     triamcinolone acetonide injection 40 mg, 40 mg, Intramuscular, 1 time in Clinic/HOD,      Allergies:  No known drug allergies     Medical History:   has a past medical history of A-fib, Alcohol abuse, Anxiety, Arthritis, Chronic gout, Diabetes mellitus, DM (diabetes mellitus) (11/16/2016), Fatty liver, Hyperlipidemia, Renal cell cancer (2014), and S/P TKR (total knee replacement), right (06/27/2019).    Surgical History:   has a past surgical history that includes Abscess drainage; Hand surgery (Left); Partial nephrectomy (Left, August 2014); Colonoscopy; Kidney surgery; Percutaneous cryotherapy of peripheral nerve using liquid nitrous oxide in closed needle device (Right, 6/17/2019); Total knee arthroplasty (Right, 6/27/2019); Joint replacement; and Endoscopic ultrasound of upper gastrointestinal tract (N/A, 6/11/2024).    Family History:  family history includes Alcohol abuse in his brother; Alzheimer's disease in his brother and mother; Cancer in his father and sister; Colon cancer in his father; Colon polyps in his brother; Diabetes in his mother; Lung cancer in his father and sister.    Social History:   reports that he has never smoked. He has never used smokeless tobacco. He reports current alcohol use of about 168.0 standard drinks of alcohol per week. He reports that he does not use drugs.    Physical Exam:  Vitals:    06/14/24 1337   BP: 119/70   Pulse: (!) 53   Weight: 80.2 kg (176 lb 11.2 oz)   Height: 5' 6" (1.676 m) "   PainSc:   6   PainLoc: Groin         GENERAL: Well appearing, in no acute distress, alert and oriented x3.  PSYCH:  Mood and affect appropriate.  SKIN: Skin color, texture, turgor normal, no rashes or lesions.  HEAD/FACE:  Normocephalic, atraumatic. Cranial nerves grossly intact.  NECK: Normal ROM. Supple.   CV: RRR with palpation of the radial artery.  PULM: No evidence of respiratory difficulty, symmetric chest rise.  GI:  Soft and non-distended.  : Testicular exam deferred.  Pt localized pain to the right testicle.   MSK: No obvious deformities, edema, or skin discoloration.  No atrophy or tone abnormalities are noted.   NEURO: Bilateral upper and lower extremity coordination and strength is symmetric.  No loss of sensation is noted.  MENTAL STATUS: A x O x 3, good concentration, speech is fluent and goal directed  MOTOR: 5/5 in all muscle groups  GAIT: Normal. Ambulates unassisted.    Imaging:  CT ABDOMEN PELVIS WITH IV CONTRAST     CLINICAL HISTORY:  Abdominal pain, acute, nonlocalized;vomiting.;     TECHNIQUE:  Low dose axial images, sagittal and coronal reformations were obtained from the lung bases to the pubic symphysis following the IV administration of 100 mL of Omnipaque 350 .  Oral contrast was not given.     COMPARISON:  Ultrasound 03/28/2024, CT abdomen and pelvis 02/28/2019     FINDINGS:  images of the lower thorax are unremarkable.     The liver is hypoattenuating suggesting steatosis, correlation with LFTs recommended. The spleen, pancreas, adrenal glands and gallbladder are unremarkable. The portal vein, splenic vein, SMV, celiac axis and SMA all are patent. The stomach is decompressed without wall thickening. There is wall thickening and mild inflammation involving the 1st portion of the duodenum, concerning for duodenitis. No obstruction. There are a few scattered prominent adjacent [place] hepatic lymph nodes, suggesting reactive change.     The kidneys enhance symmetrically without  nephrolithiasis. There is mild prominence of the bilateral renal collecting systems, right greater than left. Low attenuating lesions arise from the kidneys, too small for characterization, please see ultrasound 03/28/2024. There is mild bilateral perinephric fat stranding.  Bilateral ureters are mildly prominent, no calculi seen along their course. The urinary bladder is distended without wall thickening. The prostate is enlarged. There are bilateral fat containing inguinal hernias.     The distal large bowel is decompressed.  The terminal ileum is unremarkable. The appendix is not confidently identified, no pericecal inflammation.  The small bowel, aside from the duodenum, is unremarkable.  There are a few scattered prominent mesenteric lymph nodes. There are a few scattered shotty periaortic and paracaval lymph nodes. There is atherosclerotic calcification of the aorta and its branches.     There is osteopenia. There are degenerative changes of the bilateral sacroiliac joints as well as of the spine.  No significant inguinal lymphadenopathy.     Impression:     This report was flagged in Epic as abnormal.     1. Wall thickening and inflammation involving the 1st portion of the duodenum concerning for duodenitis.  Correlation is advised.  Direct visualization as warranted.  2. The urinary bladder is distended noting prostatomegaly.  This is likely on the basis of urinary retention or outlet obstruction however correlation with urinalysis recommended to exclude superimposed infection.  This likely accounts for prominence of the bilateral renal collecting systems.  3. Hepatomegaly, correlation with LFTs recommended.  4. Please see above for several additional findings.        Electronically signed by:Vinny Mcneal MD  Date:                                            04/01/2024      US SCROTUM AND TESTICLES WITH DOPPLER (XPD)    CLINICAL HISTORY:  rt testicular pain; Right testicular pain     TECHNIQUE:  Sonography  of the scrotum and testes.     COMPARISON:  None.     FINDINGS:  Right Testicle:     *Size: 4.0 x 1.9 x 2.7 cm  *Appearance: Normal.  *Flow: Normal arterial and venous flow  *Epididymis: Epididymal head cyst measuring 0.3 x 0.2 x 0.3 cm.  *Hydrocele: None.  *Varicocele: None.  .     Left Testicle:     *Size: 3.5 x 1.6 x 2.4 cm  *Appearance: Normal.  *Flow: Normal arterial and venous flow  *Epididymis: Hyperechoic focus in the left epididymal head measuring 0.2 x 0.2 x 0.2 cm, likely a complicated epididymal head cyst.  *Hydrocele: None.  *Varicocele: Present.  .     Other findings: None.     Impression:     No acute sonographic abnormality.     Left-sided varicocele.     Bilateral epididymal head cysts.        Electronically signed by:Archie Tapia MD  Date:                                            04/01/2024      Labs:  BMP  Lab Results   Component Value Date     (L) 05/02/2024    K 5.0 05/02/2024    CL 98 05/02/2024    CO2 22 (L) 05/02/2024    BUN 10 05/02/2024    CREATININE 1.2 05/02/2024    CALCIUM 9.1 05/02/2024    ANIONGAP 9 05/02/2024    EGFRNORACEVR >60.0 05/02/2024     Lab Results   Component Value Date    ALT 39 05/02/2024    AST 67 (H) 05/02/2024     (H) 05/06/2021    ALKPHOS 62 05/02/2024    BILITOT 0.5 05/02/2024     Lab Results   Component Value Date    WBC 5.54 05/02/2024    HGB 13.7 (L) 05/02/2024    HCT 46 05/02/2024    MCV 92 05/02/2024     05/02/2024         Assessment:  Problem List Items Addressed This Visit          Renal/    Testicular pain, right    Relevant Medications    gabapentin (NEURONTIN) 300 MG capsule     Other Visit Diagnoses       Right inguinal pain    -  Primary    Relevant Medications    gabapentin (NEURONTIN) 300 MG capsule              04/29/2024 - Donald Warren Abadie is a 68 y.o. male who  has a past medical history of A-fib, Alcohol abuse, Anxiety, Arthritis, Chronic gout, Diabetes mellitus, DM (diabetes mellitus) (11/16/2016), Fatty liver,  Hyperlipidemia, Renal cell cancer (2014), and S/P TKR (total knee replacement), right (06/27/2019).  By history and examination this patient has right testicular pain x 4 weeks. He was noted to have a right epididymal head cyst on ultrasound.  He was being treated for a UTI currently.  He was referred to us by Urology.  We discussed the underlying diagnoses and multiple treatment options including non-opioid medications and interventional procedures.  He may benefit from a right ilioinguinal nerve block under ultrasound guidance.  Patient would need to complete current antibiotic course and weight 14 days from last dose of antibiotics prior to performing this procedure.  In the meantime, I will get the patient started on gabapentin 300 mg b.i.d..  The risks and benefits of each treatment option were discussed and all questions were answered.      06/14/[id number]-nseo-old male that presents today for a follow-up appointment he has a history of right testicular pain recently provided a right ileal inguinal nerve block under ultrasound that provided 40 50% relief of his symptoms for 3 weeks in his pain slowly returned.  During our history and physical today he did report not taking his gabapentin consistently he is previously prescribed 300 mg gabapentin b.i.d. as a renal dose his last GFR was 50.  Recommended that he return taking this medication I will renew his prescription today discussed he could possibly repeat the right ileal inguinal nerve block on ultrasound in 3-4 months with Dr. Bahena.  Patient verbalized understanding see plan agreed upon below.      Treatment Plan:   Procedures:  Schedule patient for repeat right ilioinguinal nerve block under ultrasound guidance in 3 months.    PT/OT/HEP: I have stressed the importance of physical activity and a home exercise plan to help with pain and improve health.  Urology recommending supportive underwear, warm compresses, and scrotal elevation.  Medications:    -  restart gabapentin 300 mg b.i.d..  Renal dosing.  Last GFR >60    - I strongly cautioned the patient against the concurrent use of opioids and benzodiazepines. Combining opioids and benzodiazepines can increase risk of overdose as both drugs can cause sedation and suppress breathing, in addition to impairing cognitive functions.    -  Reviewed and consistent with medication use as prescribed.  Imaging: Ultrasound imaging results reviewed.   Follow Up: 3 months for repeat procedure.     [facility] Arellano  Interventional Pain Management    Disclaimer: This note was partly generated using dictation software which may occasionally result in transcription errors.

## 2024-06-14 ENCOUNTER — OFFICE VISIT (OUTPATIENT)
Dept: PAIN MEDICINE | Facility: CLINIC | Age: 69
End: 2024-06-14
Payer: MEDICARE

## 2024-06-14 VITALS
WEIGHT: 176.69 LBS | BODY MASS INDEX: 28.4 KG/M2 | SYSTOLIC BLOOD PRESSURE: 119 MMHG | HEART RATE: 53 BPM | DIASTOLIC BLOOD PRESSURE: 70 MMHG | HEIGHT: 66 IN

## 2024-06-14 DIAGNOSIS — N50.811 TESTICULAR PAIN, RIGHT: ICD-10-CM

## 2024-06-14 DIAGNOSIS — R10.31 RIGHT INGUINAL PAIN: Primary | ICD-10-CM

## 2024-06-14 PROCEDURE — 3044F HG A1C LEVEL LT 7.0%: CPT | Mod: HCNC,CPTII,S$GLB, | Performed by: NURSE PRACTITIONER

## 2024-06-14 PROCEDURE — 3078F DIAST BP <80 MM HG: CPT | Mod: HCNC,CPTII,S$GLB, | Performed by: NURSE PRACTITIONER

## 2024-06-14 PROCEDURE — 3074F SYST BP LT 130 MM HG: CPT | Mod: HCNC,CPTII,S$GLB, | Performed by: NURSE PRACTITIONER

## 2024-06-14 PROCEDURE — 3008F BODY MASS INDEX DOCD: CPT | Mod: HCNC,CPTII,S$GLB, | Performed by: NURSE PRACTITIONER

## 2024-06-14 PROCEDURE — 1159F MED LIST DOCD IN RCRD: CPT | Mod: HCNC,CPTII,S$GLB, | Performed by: NURSE PRACTITIONER

## 2024-06-14 PROCEDURE — 1160F RVW MEDS BY RX/DR IN RCRD: CPT | Mod: HCNC,CPTII,S$GLB, | Performed by: NURSE PRACTITIONER

## 2024-06-14 PROCEDURE — 99214 OFFICE O/P EST MOD 30 MIN: CPT | Mod: HCNC,S$GLB,, | Performed by: NURSE PRACTITIONER

## 2024-06-14 PROCEDURE — 99999 PR PBB SHADOW E&M-EST. PATIENT-LVL IV: CPT | Mod: PBBFAC,HCNC,, | Performed by: NURSE PRACTITIONER

## 2024-06-14 PROCEDURE — 1125F AMNT PAIN NOTED PAIN PRSNT: CPT | Mod: HCNC,CPTII,S$GLB, | Performed by: NURSE PRACTITIONER

## 2024-06-14 RX ORDER — GABAPENTIN 300 MG/1
300 CAPSULE ORAL 2 TIMES DAILY
Qty: 60 CAPSULE | Refills: 1 | Status: SHIPPED | OUTPATIENT
Start: 2024-06-14 | End: 2024-08-13

## 2024-06-22 ENCOUNTER — TELEPHONE (OUTPATIENT)
Dept: INTERNAL MEDICINE | Facility: CLINIC | Age: 69
End: 2024-06-22
Payer: MEDICARE

## 2024-06-22 NOTE — TELEPHONE ENCOUNTER
Notified pt of appt on Thurs 06/27 @ 0930.    ---- Message from Bacilio Larry MD sent at 6/21/2024  2:43 PM CDT -----  His EGD showed a cystic mass in his pancreas. Please schedule a follow up with me.

## 2024-06-27 ENCOUNTER — HOSPITAL ENCOUNTER (OUTPATIENT)
Dept: RADIOLOGY | Facility: HOSPITAL | Age: 69
Discharge: HOME | End: 2024-06-27
Attending: INTERNAL MEDICINE
Payer: MEDICARE

## 2024-06-27 ENCOUNTER — OFFICE VISIT (OUTPATIENT)
Dept: INTERNAL MEDICINE | Facility: CLINIC | Age: 69
End: 2024-06-27
Payer: MEDICARE

## 2024-06-27 VITALS
HEIGHT: 66 IN | DIASTOLIC BLOOD PRESSURE: 70 MMHG | OXYGEN SATURATION: 99 % | BODY MASS INDEX: 28.53 KG/M2 | SYSTOLIC BLOOD PRESSURE: 120 MMHG | HEART RATE: 67 BPM | WEIGHT: 177.5 LBS

## 2024-06-27 DIAGNOSIS — R10.9 RIGHT FLANK PAIN: ICD-10-CM

## 2024-06-27 DIAGNOSIS — K86.89 PANCREATIC MASS: ICD-10-CM

## 2024-06-27 DIAGNOSIS — R10.9 RIGHT FLANK PAIN: Primary | ICD-10-CM

## 2024-06-27 PROCEDURE — 3008F BODY MASS INDEX DOCD: CPT | Mod: HCNC,CPTII,S$GLB, | Performed by: INTERNAL MEDICINE

## 2024-06-27 PROCEDURE — 3288F FALL RISK ASSESSMENT DOCD: CPT | Mod: HCNC,CPTII,S$GLB, | Performed by: INTERNAL MEDICINE

## 2024-06-27 PROCEDURE — 1101F PT FALLS ASSESS-DOCD LE1/YR: CPT | Mod: HCNC,CPTII,S$GLB, | Performed by: INTERNAL MEDICINE

## 2024-06-27 PROCEDURE — 72100 X-RAY EXAM L-S SPINE 2/3 VWS: CPT | Mod: 26,HCNC,, | Performed by: RADIOLOGY

## 2024-06-27 PROCEDURE — 1159F MED LIST DOCD IN RCRD: CPT | Mod: HCNC,CPTII,S$GLB, | Performed by: INTERNAL MEDICINE

## 2024-06-27 PROCEDURE — 3044F HG A1C LEVEL LT 7.0%: CPT | Mod: HCNC,CPTII,S$GLB, | Performed by: INTERNAL MEDICINE

## 2024-06-27 PROCEDURE — 99213 OFFICE O/P EST LOW 20 MIN: CPT | Mod: HCNC,S$GLB,, | Performed by: INTERNAL MEDICINE

## 2024-06-27 PROCEDURE — 3074F SYST BP LT 130 MM HG: CPT | Mod: HCNC,CPTII,S$GLB, | Performed by: INTERNAL MEDICINE

## 2024-06-27 PROCEDURE — 3078F DIAST BP <80 MM HG: CPT | Mod: HCNC,CPTII,S$GLB, | Performed by: INTERNAL MEDICINE

## 2024-06-27 PROCEDURE — 1126F AMNT PAIN NOTED NONE PRSNT: CPT | Mod: HCNC,CPTII,S$GLB, | Performed by: INTERNAL MEDICINE

## 2024-06-27 PROCEDURE — 72100 X-RAY EXAM L-S SPINE 2/3 VWS: CPT | Mod: TC,HCNC

## 2024-06-27 PROCEDURE — 99999 PR PBB SHADOW E&M-EST. PATIENT-LVL V: CPT | Mod: PBBFAC,HCNC,, | Performed by: INTERNAL MEDICINE

## 2024-06-27 NOTE — PROGRESS NOTES
CC: Follow-up     HPI:  The patient is a 68-year-old male with AFib, alcohol abuse, hypertension, hyperlipidemia, elevated triglycerides, type 2 diabetes, reflux, gout, renal cell cancer status post nephrectomy, anxiety and recent diagnosis of a pancreatic cyst suspicious for malignancy who presents today for follow-up.  The patient has endoscopic ultrasound on June 11th which showed a cystic lesion in the pancreatic head.  No tissue was obtained.  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  The patient states he was not aware of the results.  The patient is still complains of testicular pain involving the right side.  The pain occurs in the right lower back which radiates into the right groin to the testicle.  This occurs with bending over and picking up objects, bending over to use his weed eater.  He reports he can not ride in his jet ski or use his trawl boat because of this.  He gets pain with lifting.    ROS: Patient reports he was still drinking.  He did not state how much.  He was on gabapentin for pain which helped some.    Physical exam:  Pulmonary: Good inspiratory, expiratory breath sounds are heard.  Lungs are clear to auscultation.    Cardiovascular: S1-S2, rhythm is regular.  Extremities without edema.    GI: Abdomen is nontender, nondistended without hepatosplenomegaly  Ortho:  A lumbar spine exam was performed.  The patient had pain radiating from the right lower back into the right groin with extension and right lateral bending.  He also reported discomfort with right leg raise.    Assessment:    Right lower back pain which radiates into the testicle   2.  Pancreatic cyst concerning for neoplasm   Plan:   We will refer the patient to GI for evaluation of the pancreatic cyst   2.  Will put the patient for lumbar spmine film.  Pending results will order a MRI of the back.

## 2024-06-29 DIAGNOSIS — M54.16 LUMBAR RADICULOPATHY, CHRONIC: Primary | ICD-10-CM

## 2024-07-08 ENCOUNTER — TELEPHONE (OUTPATIENT)
Dept: INTERNAL MEDICINE | Facility: CLINIC | Age: 69
End: 2024-07-08
Payer: MEDICARE

## 2024-07-08 DIAGNOSIS — N50.819 PAIN IN TESTICLE, UNSPECIFIED LATERALITY: Primary | ICD-10-CM

## 2024-07-08 PROBLEM — N17.9 AKI (ACUTE KIDNEY INJURY): Status: RESOLVED | Noted: 2024-04-02 | Resolved: 2024-07-08

## 2024-07-08 NOTE — TELEPHONE ENCOUNTER
----- Message from Rip Collins sent at 7/8/2024  2:22 PM CDT -----  Regarding: Referral Needed Now  Contact: Urology [phone]  [email or web address]     Patient is calling for Medical Advice regarding: Pt at clinic now to see Urologist for testicular pain. The office called and wanted to request a referral for the pt's insurance to cover it. The office of Dr. Jay Ledesma    How long has patient had these symptoms:    Pharmacy name and phone#:    Patient wants a call back or thru [facility]: Call back number provided    Comments:    Please advise patient replies from provider may take up to 48 hours.

## 2024-07-08 NOTE — TELEPHONE ENCOUNTER
Put in external urology referral for testicular pain and faxed to [phone] Dr. Jay Ledesma at [facility].

## 2024-07-15 ENCOUNTER — TELEPHONE (OUTPATIENT)
Dept: INTERNAL MEDICINE | Facility: CLINIC | Age: 69
End: 2024-07-15
Payer: MEDICARE

## 2024-07-15 NOTE — TELEPHONE ENCOUNTER
Sent portal msg on 07/23/2024 appt this can be discussed. In office appt needed to add controlled medications to maintenance medications.

## 2024-07-15 NOTE — TELEPHONE ENCOUNTER
----- Message from Lenora Johnson sent at 7/15/2024  9:45 AM CDT -----  Contact: Self [phone]  Would like to receive medical advice.    Pharmacy name/number (copy/paste from chart):      [id number] - [place]  [address]  AT [facility] [address]  [address] [place] [address]  Phone: [phone] Fax: [phone]     Would they like a call back or a response via [facility]:  call back    Additional information:  Pt is requesting to be placed back on xanax, because of anxiety. Pt is also requesting a refill on HYDROcodone-acetaminophen (NORCO) 5-325 mg per tablet.

## 2024-07-19 ENCOUNTER — HOSPITAL ENCOUNTER (OUTPATIENT)
Dept: RADIOLOGY | Facility: HOSPITAL | Age: 69
Discharge: HOME | End: 2024-07-19
Attending: INTERNAL MEDICINE
Payer: MEDICARE

## 2024-07-19 DIAGNOSIS — M54.16 LUMBAR RADICULOPATHY, CHRONIC: ICD-10-CM

## 2024-07-19 PROCEDURE — 72148 MRI LUMBAR SPINE W/O DYE: CPT | Mod: 26,,, | Performed by: INTERNAL MEDICINE

## 2024-07-19 PROCEDURE — 72148 MRI LUMBAR SPINE W/O DYE: CPT | Mod: TC

## 2024-07-22 ENCOUNTER — PATIENT OUTREACH (OUTPATIENT)
Dept: EMERGENCY MEDICINE | Facility: HOSPITAL | Age: 69
End: 2024-07-22
Payer: MEDICARE

## 2024-07-22 NOTE — PROGRESS NOTES
Pt states he is doing well and is currently out shopping. He denies having any needs at this time and will keep his PCP f/u on tomorrow. ED navigator will follow-up with patient to assist as needed.

## 2024-07-22 NOTE — PROGRESS NOTES
Pt reminded of appt with, [facility] at 10:30 on 7-23-24. Pt verbalized understanding.      PAST MEDICAL HISTORY:  No pertinent past medical history

## 2024-07-23 ENCOUNTER — OFFICE VISIT (OUTPATIENT)
Dept: INTERNAL MEDICINE | Facility: CLINIC | Age: 69
End: 2024-07-23
Payer: MEDICARE

## 2024-07-23 VITALS
WEIGHT: 175.06 LBS | HEIGHT: 66 IN | BODY MASS INDEX: 28.14 KG/M2 | DIASTOLIC BLOOD PRESSURE: 80 MMHG | SYSTOLIC BLOOD PRESSURE: 116 MMHG

## 2024-07-23 DIAGNOSIS — M54.16 LUMBAR RADICULOPATHY, CHRONIC: Primary | ICD-10-CM

## 2024-07-23 DIAGNOSIS — E11.9 TYPE 2 DIABETES MELLITUS WITHOUT COMPLICATION, WITHOUT LONG-TERM CURRENT USE OF INSULIN: ICD-10-CM

## 2024-07-23 DIAGNOSIS — I48.91 ATRIAL FIBRILLATION WITH RVR: ICD-10-CM

## 2024-07-23 PROBLEM — F19.94 SUBSTANCE INDUCED MOOD DISORDER: Chronic | Status: RESOLVED | Noted: 2019-03-27 | Resolved: 2024-07-23

## 2024-07-23 PROCEDURE — 3074F SYST BP LT 130 MM HG: CPT | Mod: HCNC,CPTII,S$GLB, | Performed by: INTERNAL MEDICINE

## 2024-07-23 PROCEDURE — 3288F FALL RISK ASSESSMENT DOCD: CPT | Mod: HCNC,CPTII,S$GLB, | Performed by: INTERNAL MEDICINE

## 2024-07-23 PROCEDURE — 1101F PT FALLS ASSESS-DOCD LE1/YR: CPT | Mod: HCNC,CPTII,S$GLB, | Performed by: INTERNAL MEDICINE

## 2024-07-23 PROCEDURE — 3008F BODY MASS INDEX DOCD: CPT | Mod: HCNC,CPTII,S$GLB, | Performed by: INTERNAL MEDICINE

## 2024-07-23 PROCEDURE — 99214 OFFICE O/P EST MOD 30 MIN: CPT | Mod: HCNC,S$GLB,, | Performed by: INTERNAL MEDICINE

## 2024-07-23 PROCEDURE — 1125F AMNT PAIN NOTED PAIN PRSNT: CPT | Mod: HCNC,CPTII,S$GLB, | Performed by: INTERNAL MEDICINE

## 2024-07-23 PROCEDURE — 3079F DIAST BP 80-89 MM HG: CPT | Mod: HCNC,CPTII,S$GLB, | Performed by: INTERNAL MEDICINE

## 2024-07-23 PROCEDURE — 3044F HG A1C LEVEL LT 7.0%: CPT | Mod: HCNC,CPTII,S$GLB, | Performed by: INTERNAL MEDICINE

## 2024-07-23 PROCEDURE — 1159F MED LIST DOCD IN RCRD: CPT | Mod: HCNC,CPTII,S$GLB, | Performed by: INTERNAL MEDICINE

## 2024-07-23 PROCEDURE — 99999 PR PBB SHADOW E&M-EST. PATIENT-LVL IV: CPT | Mod: PBBFAC,HCNC,, | Performed by: INTERNAL MEDICINE

## 2024-07-23 RX ORDER — HYDROCODONE BITARTRATE AND ACETAMINOPHEN 5; 325 MG/1; MG/1
1 TABLET ORAL EVERY 6 HOURS PRN
Qty: 28 TABLET | Refills: 0 | Status: SHIPPED | OUTPATIENT
Start: 2024-07-23 | End: 2024-07-30

## 2024-07-23 NOTE — PROGRESS NOTES
CC: Follow-up     HPI:  The patient is a 68-year-old male with AFib, alcohol abuse, hypertension, hyperlipidemia, elevated triglycerides, type 2 diabetes, reflux, gout, renal cell cancer status post nephrectomy, anxiety and recent diagnosis of the pancreatic cyst suspicious for malignancy who presents today for follow-up of right flank /groin pain.  The patient did have an MRI performed which showed multilevel degenerative changes.  The patient reports he still has pain when he bends over.  He was trouble walking for any length time, cutting his grass, washing his car etc..    ROS: Patient reports his blood pressure is up and down.  He does report some nausea on occasion.  He was taking metformin twice a day.  His blood sugar has been 130-135 range.  He is still drinking alcohol but less than a 6 pack per day.      Physical exam:   General appearance: No acute distress  Pulmonary: Good inspiratory, expiratory breath sounds are heard.  Lungs are clear auscultation.    Cardiovascular: S1-S2, rhythm is regular.  Extremities without edema.  GI: Abdomen is nontender, nondistended without hepatosplenomegaly  Ortho: The patient had fair flexion but significant pain with extension on the right side.  He had negative leg raises.  Comments: Did discuss with the patient that I can prescribe 7 days' worth of Vicodin.  He can not drink more than 4 beers in a day.  He is not supposed to drink alcohol with the Vicodin.  Also discussed with him about seeing our back clinic.  He will see his chiropractor today or tomorrow to see if that helps with his back.      Assessment:    Right flank/back pain  2.  Continuing use of alcohol  3.  AFib   4.  Type 2 diabetes   Plan:    The patient has a try over-the-counter 4% lidocaine patches   2.  The patient has a try Vicodin only if needed  3.  The patient has see his chiropractor  4.  Refer the patient to the back and spine clinic.

## 2024-07-25 ENCOUNTER — OFFICE VISIT (OUTPATIENT)
Dept: SPINE | Facility: CLINIC | Age: 69
End: 2024-07-25
Payer: MEDICARE

## 2024-07-25 VITALS
SYSTOLIC BLOOD PRESSURE: 146 MMHG | DIASTOLIC BLOOD PRESSURE: 68 MMHG | HEIGHT: 66 IN | RESPIRATION RATE: 12 BRPM | BODY MASS INDEX: 27.99 KG/M2 | OXYGEN SATURATION: 100 % | WEIGHT: 174.19 LBS | HEART RATE: 65 BPM

## 2024-07-25 DIAGNOSIS — R10.9 FLANK PAIN: Primary | ICD-10-CM

## 2024-07-25 DIAGNOSIS — S39.012D STRAIN OF LUMBAR REGION, SUBSEQUENT ENCOUNTER: ICD-10-CM

## 2024-07-25 DIAGNOSIS — M54.9 DORSALGIA: ICD-10-CM

## 2024-07-25 DIAGNOSIS — M47.816 SPONDYLOSIS OF LUMBAR REGION WITHOUT MYELOPATHY OR RADICULOPATHY: ICD-10-CM

## 2024-07-25 DIAGNOSIS — M51.36 DDD (DEGENERATIVE DISC DISEASE), LUMBAR: ICD-10-CM

## 2024-07-25 PROCEDURE — 1125F AMNT PAIN NOTED PAIN PRSNT: CPT | Mod: HCNC,CPTII,S$GLB, | Performed by: NURSE PRACTITIONER

## 2024-07-25 PROCEDURE — 99999 PR PBB SHADOW E&M-EST. PATIENT-LVL V: CPT | Mod: PBBFAC,HCNC,, | Performed by: NURSE PRACTITIONER

## 2024-07-25 PROCEDURE — 3008F BODY MASS INDEX DOCD: CPT | Mod: HCNC,CPTII,S$GLB, | Performed by: NURSE PRACTITIONER

## 2024-07-25 PROCEDURE — 3288F FALL RISK ASSESSMENT DOCD: CPT | Mod: HCNC,CPTII,S$GLB, | Performed by: NURSE PRACTITIONER

## 2024-07-25 PROCEDURE — 3044F HG A1C LEVEL LT 7.0%: CPT | Mod: HCNC,CPTII,S$GLB, | Performed by: NURSE PRACTITIONER

## 2024-07-25 PROCEDURE — 3077F SYST BP >= 140 MM HG: CPT | Mod: HCNC,CPTII,S$GLB, | Performed by: NURSE PRACTITIONER

## 2024-07-25 PROCEDURE — 99204 OFFICE O/P NEW MOD 45 MIN: CPT | Mod: HCNC,S$GLB,, | Performed by: NURSE PRACTITIONER

## 2024-07-25 PROCEDURE — 3078F DIAST BP <80 MM HG: CPT | Mod: HCNC,CPTII,S$GLB, | Performed by: NURSE PRACTITIONER

## 2024-07-25 PROCEDURE — 1160F RVW MEDS BY RX/DR IN RCRD: CPT | Mod: HCNC,CPTII,S$GLB, | Performed by: NURSE PRACTITIONER

## 2024-07-25 PROCEDURE — 1101F PT FALLS ASSESS-DOCD LE1/YR: CPT | Mod: HCNC,CPTII,S$GLB, | Performed by: NURSE PRACTITIONER

## 2024-07-25 PROCEDURE — 1159F MED LIST DOCD IN RCRD: CPT | Mod: HCNC,CPTII,S$GLB, | Performed by: NURSE PRACTITIONER

## 2024-07-25 NOTE — PROGRESS NOTES
"Subjective:     Patient ID: Donald Warren Abadie is a 68 y.o. male.    Chief Complaint: No chief complaint on file.    HPI Mr. Abadie is a 68 year old male here for evaluation of back pain and MRI review    C/o right sided low back/flank/testicular pain  Currently seeing Dr. Bahena in pain management,Right inguinal nerve block 5/17/24  Did PT years ago with some benefit  Prescribed hydrocodone from PCP    Lumbar MRI 2024    FINDINGS:  ALIGNMENT: Transitional lumbosacral anatomy with lumbarization of S1.  No spondylolisthesis.     BONE: No compression fractures.  Diffusely heterogeneous bone marrow.  T12 vertebral hemangioma.  No aggressive focal lesion.     JOINT: Flowing anterior osteophytes and disc desiccation at multiple levels.  No significant disc height loss.  Multilevel facet arthropathy.  Mild endplate edema at L1-2 and L5-S1.     SPINAL CANAL: The conus medullaris has a normal appearance and terminates at the L1 level.  Cauda equina nerve roots are unremarkable.  No mass or collection.     PARASPINAL SOFT TISSUES: Left renal cyst.     SIGNIFICANT FINDINGS BY LEVEL:     T12-L1: Unremarkable.     L1-2: Disc bulge.  No canal stenosis.  Mild right foraminal stenosis.     L2-3: Disc bulge.  Central annular fissure.  Mild canal stenosis.  Mild bilateral foraminal stenosis.     L3-4: Disc bulge.  Mild canal stenosis.  Encroachment on the left descending L4 nerve root in the lateral recess.  Mild bilateral foraminal stenosis.     L4-5: Mild disc bulge.  No canal or foraminal stenosis.     L5-S1: Disc bulge.  No canal stenosis.  Moderate right and mild left foraminal stenosis.     Impression:     Multilevel degenerative changes as described.  Note transitional lumbosacral anatomy.    Past Medical History:   Diagnosis Date    A-fib     Alcohol abuse     Anxiety     Arthritis     Chronic gout     Diabetes mellitus     DM (diabetes mellitus) 11/16/2016    "boarderline, not taking any meds currently"    Fatty liver     " Hyperlipidemia     Renal cell cancer 2014    S/P TKR (total knee replacement), right 06/27/2019       Past Surgical History:   Procedure Laterality Date    ABSCESS DRAINAGE      perirectal    COLONOSCOPY      ENDOSCOPIC ULTRASOUND OF UPPER GASTROINTESTINAL TRACT N/A 6/11/2024    Procedure: ULTRASOUND, UPPER GI TRACT, ENDOSCOPIC;  Surgeon: Mode Wood MD;  Location: [facility] ENDO;  Service: Endoscopy;  Laterality: N/A;  6/7 portal-EUS in 7-10 days please, [place] or [place] approved  Te 6/7-tt  6/10-precall complete-MS    HAND SURGERY Left     JOINT REPLACEMENT      knee replacement right knee    KIDNEY SURGERY      partial left kidney removal - [place]    PARTIAL NEPHRECTOMY Left August 2014    PERCUTANEOUS CRYOTHERAPY OF PERIPHERAL NERVE USING LIQUID NITROUS OXIDE IN CLOSED NEEDLE DEVICE Right 6/17/2019    Procedure: CRYOTHERAPY, NERVE, PERIPHERAL, PERCUTANEOUS, USING LIQUID NITROUS OXIDE IN CLOSED NEEDLE DEVICE-right knee iovera;  Surgeon: Donny Hair III, MD;  Location: [facility] CATH LAB;  Service: Pain Management;  Laterality: Right;    TOTAL KNEE ARTHROPLASTY Right 6/27/2019    Procedure: ARTHROPLASTY, KNEE, TOTAL-SAME DAY;  Surgeon: Mikael Huerta MD;  Location: [facility] OR [address] [place] [address];  Service: Orthopedics;  Laterality: Right;       Family History   Problem Relation Name Age of Onset    Lung cancer Father      Colon cancer Father      Cancer Father          lung cancer    Diabetes Mother      Alzheimer's disease Mother      Lung cancer Sister      Cancer Sister          lung    Colon polyps Brother      Alcohol abuse Brother      Alzheimer's disease Brother      Cirrhosis Neg Hx         Social History     Socioeconomic History    Marital status: Single   Occupational History     Employer: [email or web address]   Tobacco Use    Smoking status: Never    Smokeless tobacco: Never   Substance and Sexual Activity    Alcohol use: Yes     Alcohol/week: 168.0 standard drinks of alcohol     Types: 168 Cans  of beer per week     Comment: 12-24  beers daily    Drug use: No    Sexual activity: Not Currently     Partners: Female     Social Determinants of Health     Financial Resource Strain: Low Risk  (5/16/2024)    Overall Financial Resource Strain (CARDIA)     Difficulty of Paying Living Expenses: Not hard at all   Food Insecurity: No Food Insecurity (5/16/2024)    Hunger Vital Sign     Worried About Running Out of Food in the Last Year: Never true     Ran Out of Food in the Last Year: Never true   Transportation Needs: No Transportation Needs (5/16/2024)    PRAPARE - Transportation     Lack of Transportation (Medical): No     Lack of Transportation (Non-Medical): No   Physical Activity: Inactive (5/16/2024)    Exercise Vital Sign     Days of Exercise per Week: 0 days     Minutes of Exercise per Session: 0 min   Stress: No Stress Concern Present (5/16/2024)    [employer] - Occupational Stress Questionnaire     Feeling of Stress : Only a little   Recent Concern: Stress - Stress Concern Present (4/2/2024)    [employer] - Occupational Stress Questionnaire     Feeling of Stress : To some extent   Housing Stability: Low Risk  (4/2/2024)    Housing Stability Vital Sign     Unable to Pay for Housing in the Last Year: No     Number of Places Lived in the Last Year: 1     Unstable Housing in the Last Year: No       Current Outpatient Medications   Medication Sig Dispense Refill    allopurinoL (ZYLOPRIM) 100 MG tablet TAKE 2 TABLETS(200 MG) BY MOUTH EVERY  tablet 0    cyanocobalamin (VITAMIN B-12) 1000 MCG tablet Take 1 tablet (1,000 mcg total) by mouth once daily.      diclofenac sodium (VOLTAREN) 1 % Gel Apply 2 g topically 3 (three) times daily as needed (Right knee - hand pain).      [START ON 8/6/2024] dronedarone (MULTAQ) 400 mg Tab Take 1 tablet (400 mg total) by mouth 2 (two) times daily with meals. 60 tablet 11    ELIQUIS 5 mg Tab TAKE 1 TABLET BY MOUTH  TWICE DAILY (Patient taking differently: Take 5 mg by mouth 2 (two) times daily.) 180 tablet 3    gabapentin (NEURONTIN) 300 MG capsule Take 1 capsule (300 mg total) by mouth 2 (two) times daily. 60 capsule 1    HYDROcodone-acetaminophen (NORCO) 5-325 mg per tablet Take 1 tablet by mouth every 6 (six) hours as needed for Pain. 28 tablet 0    ketoconazole (NIZORAL) 2 % cream Apply topically once daily. Apply to affected skin from toes to heels twice a day for 3-4 weeks. 60 g 6    magnesium oxide (MAG-OX) 400 mg (241.3 mg magnesium) tablet Take 1 tablet (400 mg total) by mouth once daily. 14 tablet 0    metFORMIN (GLUCOPHAGE-XR) 500 MG ER 24hr tablet Take 2 tablets (1,000 mg total) by mouth 2 (two) times daily with meals. 360 tablet 3    metoprolol succinate (TOPROL-XL) 25 MG 24 hr tablet Take 1 tablet (25 mg total) by mouth once daily. 90 tablet 3    multivitamin (THERAGRAN) tablet Take 1 tablet by mouth once daily.      omega-3 acid ethyl esters (LOVAZA) 1 gram capsule Take 2 g by mouth 2 (two) times daily.      pantoprazole (PROTONIX) 40 MG tablet Take 1 tablet (40 mg total) by mouth once daily. 90 tablet 3    rOPINIRole (REQUIP) 1 MG tablet TAKE 1 TABLET(1 MG) BY MOUTH EVERY EVENING 90 tablet 3    rosuvastatin (CRESTOR) 20 MG tablet TAKE 1 TABLET(20 MG) BY MOUTH EVERY DAY 90 tablet 3    sertraline (ZOLOFT) 100 MG tablet Take 1 tablet (100 mg total) by mouth once daily. 90 tablet 3    tamsulosin (FLOMAX) 0.4 mg Cap TAKE 1 CAPSULE(0.4 MG) BY MOUTH EVERY DAY (Patient taking differently: Take 0.4 mg by mouth every evening.) 90 capsule 3    VITAMIN B-1 100 MG tablet TAKE 1 TABLET BY MOUTH ONCE DAILY 30 tablet 2    folic acid (FOLVITE) 1 MG tablet Take 1 tablet (1 mg total) by mouth once daily. 30 tablet 11     Current Facility-Administered Medications   Medication Dose Route Frequency Provider Last Rate Last Admin    triamcinolone acetonide injection 40 mg  40 mg Intramuscular 1 time in Clinic/HOD            Review of  patient's allergies indicates:   Allergen Reactions    No known drug allergies          Review of Systems   Constitutional: Negative for fever.   Cardiovascular:  Negative for chest pain.   Respiratory:  Negative for shortness of breath.    Musculoskeletal:  Positive for back pain.   Gastrointestinal:  Negative for bowel incontinence.   Genitourinary:  Negative for bladder incontinence.   Neurological:  Negative for numbness and paresthesias.        Objective:     General: Kleber is well-developed, well-nourished, appears stated age, in no acute distress, alert and oriented to time, place and person.     General    Vitals reviewed.  Constitutional: He is oriented to person, place, and time. He appears well-developed and well-nourished.   HENT:   Head: Atraumatic.   Nose: Nose normal.   Eyes: Conjunctivae are normal.   Cardiovascular:  Normal rate.            Pulmonary/Chest: Effort normal.   Neurological: He is alert and oriented to person, place, and time.   Psychiatric: He has a normal mood and affect. His behavior is normal. Judgment and thought content normal.     General Musculoskeletal Exam   Gait: normal     Back (L-Spine & T-Spine) / Neck (C-Spine) Exam     Tenderness Right paramedian tenderness of the Lower L-Spine.     Back (L-Spine & T-Spine) Range of Motion   Extension:  10   Flexion:  90     Spinal Sensation   Right Side Sensation  L-Spine Level: normal  S-Spine Level: normal  Left Side Sensation  L-Spine Level: normal  S-Spine Level: normal    Other   He has no scoliosis .  Spinal Kyphosis:  Absent      Muscle Strength   Right Upper Extremity   Biceps: 5/5   Deltoid:  5/5  Triceps:  5/5  Left Upper Extremity  Biceps: 5/5   Deltoid:  5/5  Triceps:  5/5  Right Lower Extremity   Hip Flexion: 5/5   Quadriceps:  5/5   Anterior tibial:  5/5   EHL:  5/5  Left Lower Extremity   Hip Flexion: 5/5   Quadriceps:  5/5   Anterior tibial:  5/5   EHL:  5/5    Reflexes     Left Side  Biceps:  2+  Brachioradialis:   2+  Achilles:  2+    Right Side   Biceps:  2+  Brachioradialis:  2+  Achilles:  2+    Vascular Exam     Right Pulses        Carotid:                  2+    Left Pulses        Carotid:                  2+          Assessment:     1. Flank pain    2. Dorsalgia    3. Spondylosis of lumbar region without myelopathy or radiculopathy    4. Strain of lumbar region, subsequent encounter    5. DDD (degenerative disc disease), lumbar         Plan:        Prior records reviewed  We discussed back pain and the nature of back pain.  We discussed that it is not one thing that causes the pain but an accumulation of multiple things that we do.    Referral to [facility] [place]  Followup with Dr. Bahena in pain to discuss additional interventions   We discussed posture sitting and the importance of trying to sit better.    We discussed the benefits of therapy and exercise and continuing to move.   No followup needed    More than 50% of the total time  of 45 minutes was spent face to face in counseling on diagnosis and treatment options. I also counseled patient  on common and most usual side effect of prescribed medications.  I reviewed Primary care , and other specialty's notes to better coordinate patient's care. All questions were answered, and patient voiced understanding.      Follow-up: No follow-ups on file. If there are any questions prior to this, the patient was instructed to contact the office.

## 2024-08-02 ENCOUNTER — TELEPHONE (OUTPATIENT)
Dept: INTERNAL MEDICINE | Facility: CLINIC | Age: 69
End: 2024-08-02
Payer: MEDICARE

## 2024-08-02 NOTE — TELEPHONE ENCOUNTER
----- Message from Britta Bustostte Tyrone sent at 8/2/2024 12:40 PM CDT -----  Contact: [phone]  [email or web address]     Patient is calling for Medical Advice regarding:pt is calling he have been having issue with his side, back please call him back to discuss.    How long has patient had these symptoms:    Pharmacy name and phone#:    Patient wants a call back or thru [facility]:callback    Comments:    Please advise patient replies from provider may take up to 48 hours.

## 2024-08-02 NOTE — TELEPHONE ENCOUNTER
Pt said he's been having back and side pain for 6 months he went to the doctor on [address] pt states he is ok but pt states theres some insurance barriers and [employer] told pt that you will have to reach out to [employer] per the pt. A good callback number [phone], pt will like for  or his nurse to call him directly.

## 2024-08-03 ENCOUNTER — HOSPITAL ENCOUNTER (INPATIENT)
Facility: HOSPITAL | Age: 69
LOS: 1 days | Discharge: HOME | DRG: 897 | End: 2024-08-05
Attending: EMERGENCY MEDICINE | Admitting: STUDENT IN AN ORGANIZED HEALTH CARE EDUCATION/TRAINING PROGRAM
Payer: MEDICARE

## 2024-08-03 DIAGNOSIS — R00.0 TACHYCARDIA: ICD-10-CM

## 2024-08-03 DIAGNOSIS — F10.939 ALCOHOL WITHDRAWAL SYNDROME WITH COMPLICATION: ICD-10-CM

## 2024-08-03 DIAGNOSIS — S43.004A DISLOCATION OF RIGHT SHOULDER JOINT, INITIAL ENCOUNTER: ICD-10-CM

## 2024-08-03 DIAGNOSIS — S49.90XA SHOULDER INJURY: ICD-10-CM
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
## 2024-08-03 DIAGNOSIS — G93.40 ACUTE ENCEPHALOPATHY: ICD-10-CM

## 2024-08-03 DIAGNOSIS — R07.9 CHEST PAIN: ICD-10-CM

## 2024-08-03 DIAGNOSIS — F10.20 ALCOHOL USE DISORDER, SEVERE, DEPENDENCE: ICD-10-CM

## 2024-08-03 DIAGNOSIS — F10.929 ALCOHOLIC INTOXICATION WITH COMPLICATION: ICD-10-CM

## 2024-08-03 DIAGNOSIS — S49.90XA ARM INJURY: ICD-10-CM

## 2024-08-03 DIAGNOSIS — S69.90XA WRIST INJURY: ICD-10-CM

## 2024-08-03 DIAGNOSIS — S59.909A ELBOW INJURY: ICD-10-CM

## 2024-08-03 DIAGNOSIS — S29.9XXA CHEST INJURY: ICD-10-CM

## 2024-08-03 DIAGNOSIS — F10.939 ALCOHOL WITHDRAWAL: ICD-10-CM

## 2024-08-03 PROCEDURE — 82077 ASSAY SPEC XCP UR&BREATH IA: CPT | Mod: HCNC | Performed by: EMERGENCY MEDICINE

## 2024-08-03 PROCEDURE — 93005 ELECTROCARDIOGRAM TRACING: CPT | Mod: HCNC

## 2024-08-03 PROCEDURE — 99285 EMERGENCY DEPT VISIT HI MDM: CPT | Mod: 25,HCNC

## 2024-08-03 PROCEDURE — 96375 TX/PRO/DX INJ NEW DRUG ADDON: CPT | Mod: HCNC

## 2024-08-03 PROCEDURE — 85025 COMPLETE CBC W/AUTO DIFF WBC: CPT | Mod: HCNC | Performed by: EMERGENCY MEDICINE

## 2024-08-03 PROCEDURE — 93010 ELECTROCARDIOGRAM REPORT: CPT | Mod: HCNC,,, | Performed by: INTERNAL MEDICINE

## 2024-08-03 PROCEDURE — 96376 TX/PRO/DX INJ SAME DRUG ADON: CPT | Mod: HCNC

## 2024-08-03 PROCEDURE — 96374 THER/PROPH/DIAG INJ IV PUSH: CPT | Mod: HCNC

## 2024-08-03 PROCEDURE — 80053 COMPREHEN METABOLIC PANEL: CPT | Mod: HCNC | Performed by: EMERGENCY MEDICINE

## 2024-08-03 RX ORDER — MORPHINE SULFATE 2 MG/ML
6 INJECTION, SOLUTION INTRAMUSCULAR; INTRAVENOUS
Status: COMPLETED | OUTPATIENT
Start: 2024-08-03 | End: 2024-08-04

## 2024-08-04 PROBLEM — G93.40 ACUTE ENCEPHALOPATHY: Chronic | Status: ACTIVE | Noted: 2024-08-04

## 2024-08-04 PROBLEM — G93.40 ACUTE ENCEPHALOPATHY: Status: ACTIVE | Noted: 2024-08-04

## 2024-08-04 PROBLEM — S42.91XA TRAUMATIC CLOSED DISPLACED FRACTURE OF RIGHT SHOULDER WITH ANTERIOR DISLOCATION: Status: ACTIVE | Noted: 2024-08-04

## 2024-08-04 PROBLEM — R35.1 BENIGN PROSTATIC HYPERPLASIA WITH NOCTURIA: Chronic | Status: ACTIVE | Noted: 2024-03-13

## 2024-08-04 PROBLEM — N40.1 BENIGN PROSTATIC HYPERPLASIA WITH NOCTURIA: Chronic | Status: ACTIVE | Noted: 2024-03-13

## 2024-08-04 PROBLEM — E11.9 TYPE 2 DIABETES MELLITUS WITHOUT COMPLICATION, WITHOUT LONG-TERM CURRENT USE OF INSULIN: Chronic | Status: ACTIVE | Noted: 2019-10-07

## 2024-08-04 LAB
ALBUMIN SERPL BCP-MCNC: 3.8 G/DL (ref 3.5–5.2)
ALBUMIN SERPL BCP-MCNC: 4.1 G/DL (ref 3.5–5.2)
ALLENS TEST: ABNORMAL
ALP SERPL-CCNC: 53 U/L (ref 55–135)
ALP SERPL-CCNC: 59 U/L (ref 55–135)
ALT SERPL W/O P-5'-P-CCNC: 29 U/L (ref 10–44)
ALT SERPL W/O P-5'-P-CCNC: 34 U/L (ref 10–44)
AMMONIA PLAS-SCNC: 34 UMOL/L (ref 10–50)
ANION GAP SERPL CALC-SCNC: 10 MMOL/L (ref 8–16)
ANION GAP SERPL CALC-SCNC: 17 MMOL/L (ref 8–16)
AST SERPL-CCNC: 42 U/L (ref 10–40)
AST SERPL-CCNC: 50 U/L (ref 10–40)
BASOPHILS # BLD AUTO: 0.01 K/UL (ref 0–0.2)
BASOPHILS # BLD AUTO: 0.03 K/UL (ref 0–0.2)
BASOPHILS NFR BLD: 0.1 % (ref 0–1.9)
BASOPHILS NFR BLD: 0.2 % (ref 0–1.9)
BILIRUB SERPL-MCNC: 0.8 MG/DL (ref 0.1–1)
BILIRUB SERPL-MCNC: 1.3 MG/DL (ref 0.1–1)
BUN SERPL-MCNC: 11 MG/DL (ref 8–23)
BUN SERPL-MCNC: 12 MG/DL (ref 8–23)
CALCIUM SERPL-MCNC: 8.7 MG/DL (ref 8.7–10.5)
CALCIUM SERPL-MCNC: 8.8 MG/DL (ref 8.7–10.5)
CHLORIDE SERPL-SCNC: 101 MMOL/L (ref 95–110)
CHLORIDE SERPL-SCNC: 96 MMOL/L (ref 95–110)
CO2 SERPL-SCNC: 17 MMOL/L (ref 23–29)
CO2 SERPL-SCNC: 23 MMOL/L (ref 23–29)
CREAT SERPL-MCNC: 1.1 MG/DL (ref 0.5–1.4)
CREAT SERPL-MCNC: 1.1 MG/DL (ref 0.5–1.4)
DELSYS: ABNORMAL
DIFFERENTIAL METHOD BLD: ABNORMAL
DIFFERENTIAL METHOD BLD: ABNORMAL
EOSINOPHIL # BLD AUTO: 0 K/UL (ref 0–0.5)
EOSINOPHIL # BLD AUTO: 0 K/UL (ref 0–0.5)
EOSINOPHIL NFR BLD: 0 % (ref 0–8)
EOSINOPHIL NFR BLD: 0 % (ref 0–8)
ERYTHROCYTE [DISTWIDTH] IN BLOOD BY AUTOMATED COUNT: 13.9 % (ref 11.5–14.5)
ERYTHROCYTE [DISTWIDTH] IN BLOOD BY AUTOMATED COUNT: 14.4 % (ref 11.5–14.5)
EST. GFR  (NO RACE VARIABLE): >60 ML/MIN/1.73 M^2
EST. GFR  (NO RACE VARIABLE): >60 ML/MIN/1.73 M^2
ETHANOL SERPL-MCNC: 84 MG/DL
GLUCOSE SERPL-MCNC: 136 MG/DL (ref 70–110)
GLUCOSE SERPL-MCNC: 154 MG/DL (ref 70–110)
HCO3 UR-SCNC: 22.5 MMOL/L (ref 24–28)
HCT VFR BLD AUTO: 37.8 % (ref 40–54)
HCT VFR BLD AUTO: 39 % (ref 40–54)
HGB BLD-MCNC: 13.5 G/DL (ref 14–18)
HGB BLD-MCNC: 13.6 G/DL (ref 14–18)
IMM GRANULOCYTES # BLD AUTO: 0.05 K/UL (ref 0–0.04)
IMM GRANULOCYTES # BLD AUTO: 0.07 K/UL (ref 0–0.04)
IMM GRANULOCYTES NFR BLD AUTO: 0.4 % (ref 0–0.5)
IMM GRANULOCYTES NFR BLD AUTO: 0.5 % (ref 0–0.5)
LACTATE SERPL-SCNC: 1.2 MMOL/L (ref 0.5–2.2)
LYMPHOCYTES # BLD AUTO: 1.7 K/UL (ref 1–4.8)
LYMPHOCYTES # BLD AUTO: 1.8 K/UL (ref 1–4.8)
LYMPHOCYTES NFR BLD: 11.5 % (ref 18–48)
LYMPHOCYTES NFR BLD: 14.9 % (ref 18–48)
MAGNESIUM SERPL-MCNC: 1.3 MG/DL (ref 1.6–2.6)
MCH RBC QN AUTO: 32.8 PG (ref 27–31)
MCH RBC QN AUTO: 32.9 PG (ref 27–31)
MCHC RBC AUTO-ENTMCNC: 34.6 G/DL (ref 32–36)
MCHC RBC AUTO-ENTMCNC: 36 G/DL (ref 32–36)
MCV RBC AUTO: 92 FL (ref 82–98)
MCV RBC AUTO: 95 FL (ref 82–98)
MODE: ABNORMAL
MONOCYTES # BLD AUTO: 1 K/UL (ref 0.3–1)
MONOCYTES # BLD AUTO: 1.1 K/UL (ref 0.3–1)
MONOCYTES NFR BLD: 6.6 % (ref 4–15)
MONOCYTES NFR BLD: 9.2 % (ref 4–15)
NEUTROPHILS # BLD AUTO: 11.6 K/UL (ref 1.8–7.7)
NEUTROPHILS # BLD AUTO: 9.1 K/UL (ref 1.8–7.7)
NEUTROPHILS NFR BLD: 75.4 % (ref 38–73)
NEUTROPHILS NFR BLD: 81.2 % (ref 38–73)
NRBC BLD-RTO: 0 /100 WBC
NRBC BLD-RTO: 0 /100 WBC
OHS QRS DURATION: 78 MS
OHS QRS DURATION: 86 MS
OHS QTC CALCULATION: 465 MS
OHS QTC CALCULATION: 469 MS
PCO2 BLDA: 34 MMHG (ref 35–45)
PH SMN: 7.43 [PH] (ref 7.35–7.45)
PHOSPHATE SERPL-MCNC: 3.2 MG/DL (ref 2.7–4.5)
PLATELET # BLD AUTO: 134 K/UL (ref 150–450)
PLATELET # BLD AUTO: 97 K/UL (ref 150–450)
PMV BLD AUTO: 8.2 FL (ref 9.2–12.9)
PMV BLD AUTO: 8.6 FL (ref 9.2–12.9)
PO2 BLDA: 89 MMHG (ref 80–100)
POC BE: -2 MMOL/L
POC SATURATED O2: 97 % (ref 95–100)
POC TCO2: 24 MMOL/L (ref 23–27)
POTASSIUM SERPL-SCNC: 3.2 MMOL/L (ref 3.5–5.1)
POTASSIUM SERPL-SCNC: 3.6 MMOL/L (ref 3.5–5.1)
PROT SERPL-MCNC: 6.7 G/DL (ref 6–8.4)
PROT SERPL-MCNC: 7.1 G/DL (ref 6–8.4)
RBC # BLD AUTO: 4.12 M/UL (ref 4.6–6.2)
RBC # BLD AUTO: 4.13 M/UL (ref 4.6–6.2)
SAMPLE: ABNORMAL
SITE: ABNORMAL
SODIUM SERPL-SCNC: 130 MMOL/L (ref 136–145)
SODIUM SERPL-SCNC: 134 MMOL/L (ref 136–145)
TROPONIN I SERPL DL<=0.01 NG/ML-MCNC: <0.006 NG/ML (ref 0–0.03)
WBC # BLD AUTO: 12 K/UL (ref 3.9–12.7)
WBC # BLD AUTO: 14.34 K/UL (ref 3.9–12.7)

## 2024-08-04 PROCEDURE — 0RSJXZZ REPOSITION RIGHT SHOULDER JOINT, EXTERNAL APPROACH: ICD-10-PCS | Performed by: STUDENT IN AN ORGANIZED HEALTH CARE EDUCATION/TRAINING PROGRAM

## 2024-08-04 PROCEDURE — 25000003 PHARM REV CODE 250: Mod: HCNC | Performed by: STUDENT IN AN ORGANIZED HEALTH CARE EDUCATION/TRAINING PROGRAM

## 2024-08-04 PROCEDURE — 99222 1ST HOSP IP/OBS MODERATE 55: CPT | Mod: HCNC,,, | Performed by: PSYCHIATRY & NEUROLOGY

## 2024-08-04 PROCEDURE — 25000003 PHARM REV CODE 250: Mod: HCNC | Performed by: HOSPITALIST

## 2024-08-04 PROCEDURE — 23650 CLTX SHO DSLC W/MNPJ WO ANES: CPT | Mod: HCNC,RT

## 2024-08-04 PROCEDURE — 25000003 PHARM REV CODE 250: Mod: HCNC

## 2024-08-04 PROCEDURE — 83735 ASSAY OF MAGNESIUM: CPT | Mod: HCNC | Performed by: STUDENT IN AN ORGANIZED HEALTH CARE EDUCATION/TRAINING PROGRAM

## 2024-08-04 PROCEDURE — 94761 N-INVAS EAR/PLS OXIMETRY MLT: CPT | Mod: HCNC,XB

## 2024-08-04 PROCEDURE — 82140 ASSAY OF AMMONIA: CPT | Mod: HCNC | Performed by: STUDENT IN AN ORGANIZED HEALTH CARE EDUCATION/TRAINING PROGRAM

## 2024-08-04 PROCEDURE — 63600175 PHARM REV CODE 636 W HCPCS: Mod: HCNC

## 2024-08-04 PROCEDURE — 85025 COMPLETE CBC W/AUTO DIFF WBC: CPT | Mod: HCNC | Performed by: STUDENT IN AN ORGANIZED HEALTH CARE EDUCATION/TRAINING PROGRAM

## 2024-08-04 PROCEDURE — 25000003 PHARM REV CODE 250: Mod: HCNC | Performed by: EMERGENCY MEDICINE

## 2024-08-04 PROCEDURE — 93010 ELECTROCARDIOGRAM REPORT: CPT | Mod: HCNC,,, | Performed by: INTERNAL MEDICINE

## 2024-08-04 PROCEDURE — 99900035 HC TECH TIME PER 15 MIN (STAT): Mod: HCNC

## 2024-08-04 PROCEDURE — 36600 WITHDRAWAL OF ARTERIAL BLOOD: CPT | Mod: HCNC

## 2024-08-04 PROCEDURE — 80053 COMPREHEN METABOLIC PANEL: CPT | Mod: HCNC | Performed by: STUDENT IN AN ORGANIZED HEALTH CARE EDUCATION/TRAINING PROGRAM

## 2024-08-04 PROCEDURE — 21400001 HC TELEMETRY ROOM: Mod: HCNC

## 2024-08-04 PROCEDURE — 93005 ELECTROCARDIOGRAM TRACING: CPT | Mod: HCNC

## 2024-08-04 PROCEDURE — 82803 BLOOD GASES ANY COMBINATION: CPT | Mod: HCNC

## 2024-08-04 PROCEDURE — 63600175 PHARM REV CODE 636 W HCPCS: Mod: HCNC | Performed by: EMERGENCY MEDICINE

## 2024-08-04 PROCEDURE — 84484 ASSAY OF TROPONIN QUANT: CPT | Mod: HCNC

## 2024-08-04 PROCEDURE — 83605 ASSAY OF LACTIC ACID: CPT | Mod: HCNC

## 2024-08-04 PROCEDURE — 84100 ASSAY OF PHOSPHORUS: CPT | Mod: HCNC | Performed by: STUDENT IN AN ORGANIZED HEALTH CARE EDUCATION/TRAINING PROGRAM

## 2024-08-04 RX ORDER — ENOXAPARIN SODIUM 100 MG/ML
40 INJECTION SUBCUTANEOUS EVERY 24 HOURS
Status: DISCONTINUED | OUTPATIENT
Start: 2024-08-04 | End: 2024-08-04

## 2024-08-04 RX ORDER — MAGNESIUM SULFATE HEPTAHYDRATE 40 MG/ML
2 INJECTION, SOLUTION INTRAVENOUS
Status: COMPLETED | OUTPATIENT
Start: 2024-08-04 | End: 2024-08-04

## 2024-08-04 RX ORDER — PANTOPRAZOLE SODIUM 40 MG/1
40 TABLET, DELAYED RELEASE ORAL DAILY
Status: DISCONTINUED | OUTPATIENT
Start: 2024-08-05 | End: 2024-08-05 | Stop reason: HOSPADM

## 2024-08-04 RX ORDER — LORAZEPAM 2 MG/ML
1 INJECTION INTRAMUSCULAR EVERY 4 HOURS PRN
Status: DISCONTINUED | OUTPATIENT
Start: 2024-08-04 | End: 2024-08-04

## 2024-08-04 RX ORDER — POTASSIUM CHLORIDE 20 MEQ/1
40 TABLET, EXTENDED RELEASE ORAL
Status: COMPLETED | OUTPATIENT
Start: 2024-08-04 | End: 2024-08-04

## 2024-08-04 RX ORDER — TAMSULOSIN HYDROCHLORIDE 0.4 MG/1
0.4 CAPSULE ORAL DAILY
Status: DISCONTINUED | OUTPATIENT
Start: 2024-08-05 | End: 2024-08-05 | Stop reason: HOSPADM

## 2024-08-04 RX ORDER — DIAZEPAM 5 MG/1
10 TABLET ORAL EVERY 8 HOURS
Status: DISCONTINUED | OUTPATIENT
Start: 2024-08-04 | End: 2024-08-05 | Stop reason: HOSPADM

## 2024-08-04 RX ORDER — METOPROLOL SUCCINATE 25 MG/1
25 TABLET, EXTENDED RELEASE ORAL DAILY
Status: DISCONTINUED | OUTPATIENT
Start: 2024-08-04 | End: 2024-08-05 | Stop reason: HOSPADM

## 2024-08-04 RX ORDER — IBUPROFEN 200 MG
16 TABLET ORAL
Status: DISCONTINUED | OUTPATIENT
Start: 2024-08-04 | End: 2024-08-05 | Stop reason: HOSPADM

## 2024-08-04 RX ORDER — SERTRALINE HYDROCHLORIDE 100 MG/1
100 TABLET, FILM COATED ORAL DAILY
Status: DISCONTINUED | OUTPATIENT
Start: 2024-08-04 | End: 2024-08-05 | Stop reason: HOSPADM

## 2024-08-04 RX ORDER — PROPOFOL 10 MG/ML
VIAL (ML) INTRAVENOUS CODE/TRAUMA/SEDATION MEDICATION
Status: COMPLETED | OUTPATIENT
Start: 2024-08-04 | End: 2024-08-04

## 2024-08-04 RX ORDER — PROPOFOL 10 MG/ML
0.5 VIAL (ML) INTRAVENOUS ONCE
Status: COMPLETED | OUTPATIENT
Start: 2024-08-04 | End: 2024-08-04

## 2024-08-04 RX ORDER — ATORVASTATIN CALCIUM 40 MG/1
80 TABLET, FILM COATED ORAL DAILY
Status: DISCONTINUED | OUTPATIENT
Start: 2024-08-04 | End: 2024-08-05 | Stop reason: HOSPADM

## 2024-08-04 RX ORDER — KETAMINE HYDROCHLORIDE 50 MG/ML
1 INJECTION, SOLUTION INTRAMUSCULAR; INTRAVENOUS
Status: DISCONTINUED | OUTPATIENT
Start: 2024-08-04 | End: 2024-08-04

## 2024-08-04 RX ORDER — DIAZEPAM 5 MG/1
10 TABLET ORAL 3 TIMES DAILY
Status: DISCONTINUED | OUTPATIENT
Start: 2024-08-04 | End: 2024-08-04

## 2024-08-04 RX ORDER — GLUCAGON 1 MG
1 KIT INJECTION
Status: DISCONTINUED | OUTPATIENT
Start: 2024-08-04 | End: 2024-08-05 | Stop reason: HOSPADM

## 2024-08-04 RX ORDER — LORAZEPAM 2 MG/ML
2 INJECTION INTRAMUSCULAR
Status: COMPLETED | OUTPATIENT
Start: 2024-08-04 | End: 2024-08-04

## 2024-08-04 RX ORDER — IPRATROPIUM BROMIDE AND ALBUTEROL SULFATE 2.5; .5 MG/3ML; MG/3ML
3 SOLUTION RESPIRATORY (INHALATION) EVERY 6 HOURS PRN
Status: DISCONTINUED | OUTPATIENT
Start: 2024-08-04 | End: 2024-08-05 | Stop reason: HOSPADM

## 2024-08-04 RX ORDER — IBUPROFEN 200 MG
24 TABLET ORAL
Status: DISCONTINUED | OUTPATIENT
Start: 2024-08-04 | End: 2024-08-05 | Stop reason: HOSPADM

## 2024-08-04 RX ORDER — LANOLIN ALCOHOL/MO/W.PET/CERES
400 CREAM (GRAM) TOPICAL DAILY
Status: DISCONTINUED | OUTPATIENT
Start: 2024-08-05 | End: 2024-08-05 | Stop reason: HOSPADM

## 2024-08-04 RX ORDER — KETAMINE HCL IN 0.9 % NACL 50 MG/5 ML
79 SYRINGE (ML) INTRAVENOUS
Status: DISPENSED | OUTPATIENT
Start: 2024-08-04 | End: 2024-08-04

## 2024-08-04 RX ORDER — METOPROLOL TARTRATE 1 MG/ML
5 INJECTION, SOLUTION INTRAVENOUS EVERY 5 MIN PRN
Status: DISCONTINUED | OUTPATIENT
Start: 2024-08-04 | End: 2024-08-04

## 2024-08-04 RX ORDER — KETAMINE HYDROCHLORIDE 50 MG/ML
INJECTION, SOLUTION INTRAMUSCULAR; INTRAVENOUS CODE/TRAUMA/SEDATION MEDICATION
Status: COMPLETED | OUTPATIENT
Start: 2024-08-04 | End: 2024-08-04

## 2024-08-04 RX ORDER — THIAMINE HCL 100 MG
100 TABLET ORAL DAILY
Status: DISCONTINUED | OUTPATIENT
Start: 2024-08-04 | End: 2024-08-05 | Stop reason: HOSPADM

## 2024-08-04 RX ORDER — LANOLIN ALCOHOL/MO/W.PET/CERES
1000 CREAM (GRAM) TOPICAL DAILY
Status: DISCONTINUED | OUTPATIENT
Start: 2024-08-05 | End: 2024-08-05 | Stop reason: HOSPADM

## 2024-08-04 RX ORDER — ALLOPURINOL 100 MG/1
200 TABLET ORAL DAILY
Status: DISCONTINUED | OUTPATIENT
Start: 2024-08-05 | End: 2024-08-05 | Stop reason: HOSPADM

## 2024-08-04 RX ORDER — SODIUM CHLORIDE 0.9 % (FLUSH) 0.9 %
10 SYRINGE (ML) INJECTION EVERY 12 HOURS PRN
Status: DISCONTINUED | OUTPATIENT
Start: 2024-08-04 | End: 2024-08-05 | Stop reason: HOSPADM

## 2024-08-04 RX ORDER — METOPROLOL TARTRATE 1 MG/ML
5 INJECTION, SOLUTION INTRAVENOUS EVERY 5 MIN PRN
Status: DISCONTINUED | OUTPATIENT
Start: 2024-08-04 | End: 2024-08-05 | Stop reason: HOSPADM

## 2024-08-04 RX ORDER — NALOXONE HCL 0.4 MG/ML
0.02 VIAL (ML) INJECTION
Status: DISCONTINUED | OUTPATIENT
Start: 2024-08-04 | End: 2024-08-05 | Stop reason: HOSPADM

## 2024-08-04 RX ORDER — METOPROLOL TARTRATE 1 MG/ML
5 INJECTION, SOLUTION INTRAVENOUS ONCE
Status: COMPLETED | OUTPATIENT
Start: 2024-08-04 | End: 2024-08-04

## 2024-08-04 RX ADMIN — LORAZEPAM 2 MG: 2 INJECTION INTRAMUSCULAR; INTRAVENOUS at 05:08

## 2024-08-04 RX ADMIN — APIXABAN 5 MG: 5 TABLET, FILM COATED ORAL at 12:08

## 2024-08-04 RX ADMIN — MAGNESIUM SULFATE HEPTAHYDRATE 2 G: 40 INJECTION, SOLUTION INTRAVENOUS at 07:08

## 2024-08-04 RX ADMIN — MAGNESIUM SULFATE HEPTAHYDRATE 2 G: 40 INJECTION, SOLUTION INTRAVENOUS at 05:08

## 2024-08-04 RX ADMIN — Medication 100 MG: at 09:08

## 2024-08-04 RX ADMIN — APIXABAN 5 MG: 5 TABLET, FILM COATED ORAL at 09:08

## 2024-08-04 RX ADMIN — METOPROLOL SUCCINATE 25 MG: 25 TABLET, EXTENDED RELEASE ORAL at 09:08

## 2024-08-04 RX ADMIN — METOROPROLOL TARTRATE 5 MG: 5 INJECTION, SOLUTION INTRAVENOUS at 07:08

## 2024-08-04 RX ADMIN — SODIUM CHLORIDE 1000 ML: 9 INJECTION, SOLUTION INTRAVENOUS at 05:08

## 2024-08-04 RX ADMIN — PROPOFOL 10 MG: 10 INJECTION, EMULSION INTRAVENOUS at 04:08

## 2024-08-04 RX ADMIN — PROPOFOL 20 MG: 10 INJECTION, EMULSION INTRAVENOUS at 04:08

## 2024-08-04 RX ADMIN — MORPHINE SULFATE 6 MG: 2 INJECTION, SOLUTION INTRAMUSCULAR; INTRAVENOUS at 12:08

## 2024-08-04 RX ADMIN — KETAMINE HYDROCHLORIDE 40 MG: 50 INJECTION, SOLUTION INTRAMUSCULAR; INTRAVENOUS at 04:08

## 2024-08-04 RX ADMIN — KETAMINE HYDROCHLORIDE 15 MG: 50 INJECTION, SOLUTION INTRAMUSCULAR; INTRAVENOUS at 04:08

## 2024-08-04 RX ADMIN — DRONEDARONE 400 MG: 400 TABLET, FILM COATED ORAL at 05:08

## 2024-08-04 RX ADMIN — FOLIC ACID 1 MG: 5 INJECTION, SOLUTION INTRAMUSCULAR; INTRAVENOUS; SUBCUTANEOUS at 09:08

## 2024-08-04 RX ADMIN — POTASSIUM CHLORIDE 40 MEQ: 1500 TABLET, EXTENDED RELEASE ORAL at 05:08

## 2024-08-04 RX ADMIN — ATORVASTATIN CALCIUM 80 MG: 40 TABLET, FILM COATED ORAL at 09:08

## 2024-08-04 RX ADMIN — DIAZEPAM 10 MG: 5 TABLET ORAL at 11:08

## 2024-08-04 RX ADMIN — SERTRALINE HYDROCHLORIDE 100 MG: 50 TABLET ORAL at 09:08

## 2024-08-04 RX ADMIN — POTASSIUM CHLORIDE 40 MEQ: 1500 TABLET, EXTENDED RELEASE ORAL at 09:08

## 2024-08-04 NOTE — PROVIDER PROGRESS NOTES - EMERGENCY DEPT.
Orthopedic Injury    Date/Time: 2024 5:31 AM    Performed by: Hung Robison MD  Authorized by: Cuauhtemoc Millan MD    Location procedure was performed:  [facility] EMERGENCY DEPARTMENT  Assisting Provider:  Lenora Goode MD  Pre-operative diagnosis:  R Shoulder Anterior dislocation  Post-operative diagnosis:  Right shoulder anterior dislocation, reduced  Consent Done?:  Yes  Universal Protocol:     Verbal consent obtained?: Yes      Written consent obtained?: Yes      Risks and benefits: Risks, benefits and alternatives were discussed      Consent given by:  Patient    Patient states understanding of procedure being performed: Yes      Patient's understanding of procedure matches consent: Yes      Procedure consent matches procedure scheduled: Yes      Relevant documents present and verified: Yes      Test results available and properly labeled: Yes      Site marked: Yes      Imaging studies available: Yes      Required items: Required blood products, implants, devices and special equipment avialable      Patient identity confirmed:  , name and verbally with patient    Time Out: Immediately prior to the procedure a time out was called    Injury:     Injury location:  Shoulder    Location details:  Right shoulder    Injury type:  Dislocation    Dislocation type: anterior      Chronicity:  New    Hill-Sachs deformity?: No        Pre-procedure assessment:     Neurovascular status: Neurovascularly intact      Distal perfusion: normal      Neurological function: normal      Range of motion: reduced      Local anesthesia used?: No      Patient sedated?: Yes      ASA Class:  Class 2 - Mild Illness without functional impairment.    Mallampati Score:  Class 1 - Visualization of the soft palate, fauces, uvula, and anterior/posterior pillars.    Patient/Family history of anesthesia or sedation complications: No      Sedation type: moderate (conscious) sedation      Sedation:  Ketamine and propofol     Analgesia:  Ketamine    Sedation start:  8/4/2024 4:22 AM    Sedation end:  8/4/2024 4:44 AM    Vital signs: Vital signs monitored during sedation        Selections made in this section will also lock the Injury type section above.:     Manipulation performed?: Yes      Reduction method:  External rotation and traction and counter traction    Reduction method:  External rotation and traction and counter traction    Reduction method:  External rotation and traction and counter traction    Reduction method:  External rotation and traction and counter traction    Reduction method:  External rotation and traction and counter traction    Reduction method:  External rotation and traction and counter traction    Reduction successful?: Yes      Confirmation: Reduction confirmed by x-ray      Immobilization:  Sling    Complications: No      Specimens: No      Implants: No    Post-procedure assessment:     Neurovascular status: Neurovascularly intact      Distal perfusion: normal      Neurological function: normal      Range of motion: improved      Patient tolerance:  Patient tolerated the procedure well with no immediate complications  Procedural Sedation        Date/Time: 8/4/2024 5:34 AM    Performed by: Hung Robison MD  Authorized by: Cuauhtemoc Millan MD  Consent Done: Yes  Consent: Written consent obtained.  Risks and benefits: risks, benefits and alternatives were discussed  Consent given by: patient  Patient understanding: patient states understanding of the procedure being performed  Patient consent: the patient's understanding of the procedure matches consent given  Procedure consent: procedure consent matches procedure scheduled  Relevant documents: relevant documents present and verified  Test results: test results available and properly labeled  Site marked: the operative site was marked  Imaging studies: imaging studies available  Required items: required blood products, implants, devices,  "and special equipment available  Patient identity confirmed: , name and verbally with patient  Time out: Immediately prior to procedure a "time out" was called to verify the correct patient, procedure, equipment, support staff and site/side marked as required.  ASA Class: Class 2 - Mild Illness without functional impairment.  Mallampati Score: Class 1 - Visualization of the soft palate, fauces, uvula, and anterior/posterior pillars.     Equipment: on cardiac monitor., on BP monitor., on CO2 monitor., on supplemental oxygen., suction available. and airway equipment available.     Sedation type: moderate (conscious) sedation    Sedatives: ketamine and propofol  Analgesia: ketamine  Sedation start date/time: 2024 4:22 AM  Sedation end date/time: 2024 4:44 AM  Total Sedation Time (min): 22  Vitals: Vital signs were monitored during sedation.  Complications: No complications.   Patient/Family history of anesthesia or sedation complications: No       Signed,    Hung Robison MD  Emergency Medicine, PGY-1  [facility]    Attending Note:  I have seen the patient, have repeated the key portions of the history and physical, reviewed and agree with the medical documentation, and supervised and managed the medical care of the patient. Additionally, I was present for the critical portion of any procedure(s) performed.  "

## 2024-08-04 NOTE — CONSULTS
"274}  INITIAL VISIT: ADDICTION PSYCHIATRY CONSULTATION SERVICE      ASSESSMENT AND PLAN:     DIAGNOSES & PROBLEMS:  Alcohol use disorder, severe  R/o major neurocognitive disorder     In Summary:  68 year old male with hx of alcohol use disorder, AFib on Eliquis, DM, GERD, Gout, BPH, HLD, and anxiety who presented to hospital after a fall. Psychiatry was consulted for Alcohol use disorder (per son in law he drinks a case of beer/day). Admitted with alcohol withdrawal. Interested in quitting." Patient endorses drinking 4-10 beers per day; has completed rehab in the past with longest period of sobriety 5 years. Utilizes alcohol to cope with exacerbations of anxiety. Is open to IOP placement, which family is supportive of.      Plan:       With reasonable medical certainty, based on history, chart review, available collateral information, and a present-state examination:  - the patient is deemed to be currently best managed on a medical unit, owing to the need for medical stabilization  - PEC is not indicated  - adjustments to psychotropic regimen as noted herein, otherwise continue as prescribed  - defer non-psychiatric medication(s) and management to the current provider(s) of record  - upon discharge, it is recommended to follow up with an outpatient provider within 1-2 weeks of discharge, but ideally as soon as possible  - contingent on accessibility and willingness, patient would benefit from the following referrals: addiction rehab program and mutual self-help groups (e.g. Alcoholics Anonymous, [facility]in)  - provide supportive care as warranted: e.g., fluid and electrolyte replacement, vitamin repletion (thiamine, folic acid, multivitamin), adequate caloric support  -- RECOMMENDATIONS FOR DETOX TAPER: initiate valium 10mg TID  - advance taper as tolerated; though it is equally as important to be prepared to increase doses of benzodiazepines in the short run if withdrawal symptoms worsen or fail to adequately " "improve  - options for medication-assisted treatment (MAT) for alcohol use disorder were discussed  - recommend patient enroll in addiction rehab program after discharge and medical stabilization  - counseled on full abstinence from alcohol and substances of abuse (illicit and prescription)  - full engagement in 12 step (or equivalent) recovery program(s), including meeting attendance and acquisition/maintenance of sponsor  - relapse prevention and motivational interviewing provided  - provided resources for various addiction rehabilitation options as part of aftercare planning       PRESENTATION:     Donald Warren Abadie presents with the following chief complaint: problematic substance use/abuse and alcohol and/or drug addiction    Per Chart:  He tripped and fell on his driveway, falling onto the right side of his body. Patient said he had 2 beers in the past 24 hours but normally has 4 beers a day. Patient's son-in-law is by bedside and states that he drinks a case of beer a day. The son-in-law states that the patient had has had past episodes of alcohol withdrawal after having to be sober in the hospital for past hospitalizations.     Per Patient:  Exam complicated by patient being hard of hearing without hearing aids in; frequently required repetition of questions or would respond inappropriately. Endorses tripping in driveway, which he attributes to his Crocs being slippery. States he drinks anywhere between 4-5 versus 10 bud light beers per day. Last drink was prior to his fall. Has been drinking since he was a child, states his family were all heavy users of alcohol and have since passed away. Has been to rehab before, did not feel it was helpful to him and views it as being "locked up". Had 5 year period of sobriety after attending rehab. Relapse occurred due to anxiety; endorses generalized anxiety with no specific triggers. 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 Sees a psychiatrist, Dr. Murrell, for his anxiety; endorses taking xanax for his anxiety. Has found AA meeting helpful in the past, but stopped going when he relapsed. Enjoys drinking alcohol with his friends, which he says he does frequently. Endorses intermittent consumption of marijuana.     Collateral:   Spoke with patient's son in law at bedside. Reports patient has had extensive hx of hospitalizations for alcohol withdrawal, including hx of delirium tremens. Has coded in the past as a result. Has tried naltrexone, disulfiram in the past but discontinued due to not liking the effects. Is prescribed sertraline but attempts to take prn versus daily. Family installed cameras in patient's house due to concern for well-being. Patient will go for periods without eating when he is drinking excessively. There is concern for is cognitive status, as pt has strong family hx of Alzheimer disease. Family is unsure if his cognitive decline is a result of alcohol use, dementia, or a result of pt being hard of hearing and not using hearing aids. No hx of seizures as a result of withdrawal. Son-in-law very interested in [facility], as pt has done well in the past and achieved prolonged sobriety while undergoing IOP tx.       REVIEW OF SYSTEMS:  I[]I Patient denies any pertinent ROS, and none is known.  I[]I Patient unable or unwilling to provide any ROS.    [] Y  [x] N  sleep disturbance: **    [] Y  [x] N  appetite/weight change: **    [] Y  [x] N  fatigue/anergia: **    [] Y  [x] N  impairment in focus/concentration: **       [x] Y  [] N  depression: **  Positive for: intermittent sadness, anhedonia, amotivation   [x] Y  [] N  anxiety/worry: **  Positive for: more anxiety than the average person, excessive generalized anxiety, panic attacks   [] Y  [x] N  dysregulated mood/behavior: **     [] Y  [x] N  manic symptomatology: **     [] Y  [x] N   psychosis: **           A pertinent medical review of systems was performed with the following notable findings: significant pain in back and neck    CURRENT PSYCHOTROPIC REGIMEN:  I[x]I Y  I[]I N  I[]I U    Sertraline 100mg daily - pt not strictly adherent       ADDICTION:     I[]I Y  I[x]I N  I[]I U  I[]I Current  I[]I Former  Nicotine Use:   I[x]I Y  I[]I N  I[]I U  I[]I Current  I[]I Former  Alcohol Use:   I[x]I Y  I[]I N  I[]I U  I[]I Current  I[]I Former  Alcohol Misuse/Abuse:   I[x]I Y  I[]I N  I[]I U  I[]I Current  I[]I Former  Illicit Drug Use/Misuse/Abuse:   I[]I Y  I[x]I N  I[]I U  I[]I Current  I[]I Former  Misuse/Abuse of Rx Medications:   I[x]I Cannabis  I[]I Cocaine  I[]I Heroin  I[]I Meth  I[]I Opioids  I[]I Stimulants  I[]I Benzos  I[]I Other:     I[]I N/A  I[]I U  Substance(s) of Choice: alcohol  I[]I N/A  I[]I U  Substance(s) Used Currently/Recently: alcohol  I[]I N/A  I[]I U  Alcohol Consumption: daily or near daily 4-5 or 10 bud lights daily   I[]I N/A  I[]I U  Last Drink: immediately prior to hospitalization  I[]I N/A  I[]I U  Last Drug Use: will intermittently use marijuana, cannot recall exactly when he last used   I[]I N/A  I[]I U  Duration of Sobriety/Abstinence: 5 years     I[x]I Y  I[]I N  I[]I U  Hx of Detox:   I[x]I Y  I[]I N  I[]I U  Hx of Rehab:   I[]I Y  I[x]I N  I[]I U  Hx of IVDU:   I[]I Y  I[x]I N  I[]I U  Hx of Accidental Overdose:   I[]I Y  I[x]I N  I[]I U  Hx of DUI:   I[x]I Y  I[]I N  I[]I U  Hx of Complicated Withdrawal (i.e. Seizures and/or Delirium Tremens):   I[]I Y  I[x]I N  I[]I U  Hx of Known/Suspected Substance-Induced Psychiatric Disorder:   I[x]I Y  I[]I N  I[]I U  Hx of Medication Assisted Treatment:   I[x]I Y  I[]I N  I[]I U  Hx of Twelve Step Program (or Equivalent) Involvement:   I[]I Y  I[x]I N  I[]I U  Currently Exhibits Signs of Intoxication:   I[x]I Y  I[]I N  I[]I U  Currently Exhibits Signs of Withdrawal:   I[]I Y  I[x]I N  I[]I U  Currently Active in  "Recovery:   I[x]I Y  I[]I N  I[]I U  Social Support:   I[]I Y  I[x]I N  I[]I U  Spouse/Partner Consumption:     I[]I N/A  I[x]I Y  I[]I N  I[]I U  Acknowledges/Accepts Addiction:   I[]I N/A  I[x]I Y  I[]I N  I[]I U  Advised to Quit/Cut Back:   I[]I N/A  I[x]I Y  I[]I N  I[]I U  Alcohol/Drug Cessation ("Wants to Quit"): Advised to Quit , Encouragement Provided, Resources Provided  I[]I N/A  I[x]I Y  I[]I N  I[]I U  Motivation to Pursue Treatment: Acquiescing to the Wishes/Requests of Loved Ones  I[x]I N/A  I[]I Y  I[]I N  I[]I U  Tobacco Cessation ("Wants to Quit"):     DSM-5-TR SUBSTANCE USE DISORDER CRITERIA:     -- Impaired Control:  I[x]I Y  I[]I N  I[]I U  I[]I A  I[]I D  Often take in larger amounts or over a longer period of time than was intended:   I[x]I Y  I[]I N  I[]I U  I[]I A  I[]I D  Persistent desire or unsuccessful efforts to cut down or control use:   I[x]I Y  I[]I N  I[]I U  I[]I A  I[]I D  Great deal of time spent in activities necessary to obtain substance, use, or recover from effects:   I[x]I Y  I[]I N  I[]I U  I[]I A  I[]I D  Craving/strong desire for substance or urge to use:   -- Social Impairment:  I[]I Y  I[x]I N  I[]I U  I[]I A  I[]I D  Use resulting in failure to fulfill major role obligations at home, work or school:   I[]I Y  I[x]I N  I[]I U  I[]I A  I[]I D  Social, occupational, recreational activities decreased because of use:   I[x]I Y  I[]I N  I[]I U  I[]I A  I[]I D  Continued use despite having persistent or recurrent social or interpersonal problems caused or exacerbated by the substance:   -- Risky Use:  I[x]I Y  I[]I N  I[]I U  I[]I A  I[]I D  Recurrent use in situations in which it is physically hazardous:   I[x]I Y  I[]I N  I[]I U  I[]I A  I[]I D  Use despite physical or psychological problems that are likely to have been caused or exacerbated by the substance:   -- Neuroadaptation:  I[]I Y  I[x]I N  I[]I U  I[]I A  I[]I D  Tolerance, as defined by either of the following: (1) " a need for markedly increased amounts of substance to achieve intoxication or desired effect.  -OR- (2) a markedly diminished effect with continued use of the same amount of substance:   I[x]I Y  I[]I N  I[]I U  I[]I A  I[]I D  Withdrawal, as manifested by either of the following: (1) the characteristic withdrawal syndrome for substance.  -OR- (2) substance is taken to relieve or avoid withdrawal symptoms:   -- Mild (1-3), Moderate (4-5), Severe (?6)    I[]I N/A  I[x]I Y  I[]I N  I[]I U  I[]I A  I[]I D  Active Substance Use Disorder:       HISTORY:     I[]I Patient denies any history, and none is known.  I[]I Patient unable or unwilling to provide any history.    I[x]I Y  I[]I N  I[]I U  Psychiatric Diagnoses: DOLORES  I[x]I Y  I[]I N  I[]I U  Current Psychiatric Provider (if Applicable):   I[]I Y  I[x]I N  I[]I U  Hx of Psychiatric Hospitalization:   I[x]I Y  I[]I N  I[]I U  Hx of Outpatient Psychiatric Treatment (psychiatry/psychotherapy): Dr. Larry  I[x]I Y  I[]I N  I[]I U  Psychotropic Trials: sertraline, wellbutrin, xanax, several SSRIs per son-in-law  I[]I Y  I[x]I N  I[]I U  Prior Suicide Attempts:   I[]I Y  I[x]I N  I[]I U  Hx of Suicidal Ideation:   I[]I Y  I[x]I N  I[]I U  Hx of Homicidal Ideation:   I[]I Y  I[x]I N  I[]I U  Hx of Self-Injurious Behavior (Non-Suicidal):   I[x]I Y  I[]I N  I[]I U  Hx of Violence:   I[]I Y  I[x]I N  I[]I U  Documented Hx of Malingering:     FAMILY HISTORY:  I[x]I Y  I[]I N  I[]I U    Mother with anxiety, entire family with alcohol addiction      I[]I Y  I[]I N  I[x]I U  Hx of Trauma/Neglect:   I[]I Y  I[]I N  I[x]I U  Hx of Physical Abuse:   I[]I Y  I[]I N  I[x]I U  Hx of Sexual Abuse:   I[x]I Y  I[]I N  I[]I U  Happy Childhood:   I[]I Y  I[x]I N  I[]I U  Significant Developmental Delay/Disability:   I[x]I Y  I[]I N  I[]I U  GED/High School Diploma or Beyond:   I[]I Y  I[x]I N  I[]I U  Currently Employed:   I[]I Y  I[x]I N  I[]I U  On or Applying for Disability:   I[x]I Y  I[]I  N  I[]I U  Functions Independently:   I[x]I Y  I[]I N  I[]I U  Financially Stable:   I[]I Y  I[]I N  I[x]I U  Domiciled:   I[x]I Y  I[]I N  I[]I U  Lives Alone:   I[x]I Y  I[]I N  I[]I U  Intact Support System:   I[x]I Y  I[]I N  I[]I U  Heterosexual/Cisgender:   I[x]I Y  I[]I N  I[]I U  Currently in a Romantic Relationship:   I[x]I Y  I[]I N  I[]I U  Ever :   I[x]I Y  I[]I N  I[]I U  Children/Dependents:   I[]I Y  I[]I N  I[x]I U  Zoroastrianism/Spiritual:   I[]I Y  I[]I N  I[x]I U   History:   I[]I Y  I[x]I N  I[]I U  Current Legal Issues:   I[]I Y  I[x]I N  I[]I U  Past Charges/Convictions:   I[]I Y  I[x]I N  I[]I U  Hx of Incarceration:   I[x]I Y  I[]I N  I[]I U  Access to a Gun?: gun in safe  NOTE: patient counseled on gun safety, including safe storage.  NOTE: patient counseled on inherent risks associated with gun ownership.    I[]I Y  I[x]I N  I[]I U  Hx of Seizure:   I[]I Y  I[]I N  I[x]I U  Hx of Significant Head Injury (e.g., Loss of Consciousness, Concussion, Coma):   I[x]I Y  I[]I N  I[]I U  Medical History & Diagnoses:       The patient's past medical history has been reviewed and updated as appropriate within the electronic medical record system.           Scheduled and PRN Medications: The electronic chart was reviewed and updated as appropriate.  See Medcard for details.           Allergies:  No known drug allergies    PSYCHOSOCIAL FACTORS:  Stressors (Biopsychosocial, Cultural and Environmental): pain, substance use/addiction, loneliness, aging  Functioning Relationships: good support system and good relationship with children    STRENGTHS AND LIABILITIES:   Strength: Patient has a good sense of humor.  Liability: Patient is in active addiction.    Additional Relevant History, As Applicable:       EXAMINATION:     BP (!) 146/76 (BP Location: Left arm, Patient Position: Lying)   Pulse (!) 120   Temp 98.1 °F (36.7 °C) (Oral)   Resp 19   Wt 79 kg (174 lb 2.6 oz)   SpO2 100%   BMI 28.11  "kg/m²     MENTAL STATUS EXAMINATION:  General Appearance: **   dressed in hospital garb  Behavior: **   cooperative  Involuntary Movements and Motor Activity: **   no abnormal involuntary movements noted, no psychomotor agitation or retardation  Gait and Station: **   unable to assess - patient lying down or seated  Speech and Language: **   fluent English, loud  Mood: "alright"  Affect: **   appropriate to situation and context  Thought Process and Associations: **   circumstantial  Thought Content and Perceptions: **   no suicidal or homicidal ideation, no evidence of psychosis  Sensorium: **   alert and oriented, with clear sensorium  Recent and Remote Memory: **   grossly intact, able to recall relevant and salient information from the recent and remote past, Recent Memory: able to recall contents of last meal, Remote Memory: able to relate details of childhood  Attention and Concentration: **   easily distractible  Fund of Knowledge: **   aware of current events, correctly identifies the   Insight: **   demonstrates awareness of situation  Judgment: **   compliant with health provider's recommendations and instructions      RISK MANAGEMENT:     I[]I Y  I[x]I N  I[]I U  I[]I A  Suicidal Ideation/Behavior: **   I[]I Y  I[x]I N  I[]I U  I[]I A  Homicidal Ideation/Behavior: **  I[]I Y  I[x]I N  I[]I U  I[]I A  Violence:   I[]I Y  I[x]I N  I[]I U  I[]I A  Self-Injurious Behavior: **    The patient is deemed to be with evidence of delirium. CAM-ICU positive.     I[x]I Y  I[]I N  I[]I U  I[]I A  I[]I N/A  Minimization of Risk Parameters Suspected/Evident: **  I[]I Y  I[x]I N  I[]I U  I[]I A  I[]I N/A  Exaggeration of Risk Parameters Suspected/Evident: **  Appears to be minimizing degree to which alcohol use is impairing ADLs.     [] Y  [x] N  Danger to Self:   [] Y  [x] N  Danger to Others:   [] Y  [x] N  Grave Disability:       In cases of emergency, daily coverage provided " by Acute/ER Psych MD, NP, PA, or SW, with contact numbers located in [facility] On Call Schedule.    Ninoska Cruz MD  Department of Psychiatry  [facility]      KEY:     I[]I Y = Yes / Present / Endorses  I[]I N = No / Absent / Denies  I[]I U = Unknown / Unable to Assess / Unwilling to Participate  I[]I A = Ambiguity Exists / Accuracy Uncertain  I[]I D = Denial or Minimization is Suspected/Evident  I[]I N/A = Non-Applicable    CHART REVIEW:     Available documentation has been reviewed, and pertinent elements of the chart have been incorporated into this evaluation where appropriate.    The patient's last Epic encounter in the psychiatry department was on: Visit date not found  The patient's first Epic encounter in the psychiatry department was on: 6/13/2019     LA/MS  AWARE  Site reviewed - No recent discrepancies or irregularities are noted.      ADVICE AND COUNSELING:     [x] In cases of emergencies (e.g. SI/HI resulting in danger to self or others, functioning deteriorates to the level of grave disability), call 911 or [phone], or present to the emergency department for immediate assistance.  [x] Individuals should not operate a motor vehicle or heavy machinery if effects of medications or underlying symptoms/condition impair the ability to safely do so.    Alcohol, Tobacco, and Drug Counseling, as well as resources, has been provided, as warranted.     Shared medical decision making and informed consent are the hallmark and bedrock of good clinical care, and as such have been employed and obtained, respectively, to the degree possible.      Risk Mitigation Strategies, Harm Reduction Techniques, and Safety Netting are important interventions that can reduce acute and chronic risk, and as such have been employed to the degree possible.    Prescription Drug Management entails the review, recommendation, or consideration without recommendation of medications, and as such was employed during the  encounter.    Additional Psychoeducation has been provided, as warranted.    Discussed, to the extent possible, diagnosis, risks and benefits of proposed treatment vs alternative treatments vs no treatment, potential side effects of these treatments and the inherent unpredictability of treatment. The patient expresses understanding of the above and displays the capacity to agree with this treatment given said understanding. Patient also agrees that, currently, the benefits outweigh the risks and consents to treatment at this time.     Written material has been provided to supplement, augment, and reinforce any discussions and interventions, via the AVS or other pre-printed handouts, as warranted.      DIAGNOSTIC TESTING:     The chart was reviewed for recent diagnostic procedures and investigations, and pertinent results are noted below.     Glu 154 (H)  8/4/2024  Li *   *  TSH 2.089  10/29/2023    HgA1c 5.9 (H)  2/29/2024  VPA *   *   FT4 0.84  10/29/2023    Na 134 (L)  8/4/2024  CLZ *   *  WBC 12.00  8/4/2024    Cr 1.1  8/4/2024  ANC 9.1; 75.4 (H);  (H)  8/4/2024   Hgb 13.5 (L)  8/4/2024     BUN 11  8/4/2024  Trop I <0.006  8/4/2024  HCT 39.0 (L)  8/4/2024     GFR >60.0  8/4/2024   CPK 64  6/13/2024  PLT 97 (L)  8/4/2024     Alb 3.8  8/4/2024   PRL *   *  B12 483  6/19/2023     T Bili 1.3 (H)  8/4/2024  Chol 123  11/11/2022  B9 *   *    ALP 53 (L)  8/4/2024  TGs 213 (H)  11/11/2022  B1 54  *    AST 42 (H)  8/4/2024  HDL 43  11/11/2022  Vit D *   *     ALT 29  8/4/2024  LDL 37.4 (L)  11/11/2022  HIV Non-reactive  6/21/2023     INR 1.0  10/28/2023  Corina *   *   Hep C Non-reactive  6/21/2023    GGT *   *  Lip 30  4/2/2024  RPR *   *    MCV 95  8/4/2024   NH4 34  8/4/2024  UPT *   *      PETH 1437  11/4/2021  THC Presumptive Positive (A)  6/21/2023    ETOH 84 (H)  8/3/2024  JO-ANN Negative  6/21/2023    EtG *   *  AMP Negative  6/21/2023    ALC 68 (A)  11/29/2022  OPI  Negative  6/21/2023    BZO Presumptive Positive (A)  6/21/2023  MTD Negative  6/21/2023     BAR Negative  6/21/2023  BUP Not Detected  12/22/2023    PCP Negative  6/21/2023  FEN Not Detected  12/22/2023     Results for orders placed or performed during the hospital encounter of 08/03/24   EKG 12-lead    Collection Time: 08/03/24  8:12 PM   Result Value Ref Range    QRS Duration 86 ms    [facility] QTC Calculation 469 ms    Narrative    Test Reason : R07.9,    Vent. Rate : 091 BPM     Atrial Rate : 312 BPM     P-R Int : 000 ms          QRS Dur : 086 ms      QT Int : 382 ms       P-R-T Axes : 000 -04 -22 degrees     QTc Int : 469 ms    Atrial flutter with variable A-V block  Abnormal ECG  When compared with ECG of 30-APR-2024 12:18,  Atrial flutter has replaced Sinus rhythm  Non-specific change in ST segment in Inferior leads  Inverted T waves have replaced nonspecific T wave abnormality in Inferior  leads  Confirmed by Wood SCHROEDER MD ([id number]) on 8/4/2024 8:53:30 AM    Referred By: [email or web address]   SELF           Confirmed By:Wood SCHROEDER MD       Results for orders placed or performed during the hospital encounter of 08/03/24   CT Head Without Contrast    Narrative    EXAMINATION:  CT HEAD WITHOUT CONTRAST    CLINICAL HISTORY:  Head trauma, minor (Age >= 65y);    TECHNIQUE:  Multiple sequential 5 mm axial images of the head without contrast.  Coronal and sagittal reformatted imaging from the axial acquisition.    COMPARISON:  11/29/2022    FINDINGS:  No evidence for acute intracranial hemorrhage or sulcal effacement to suggest large territory recent infarction.  Mild generalized cerebral volume loss with compensatory enlargement ventricles sulci and cisterns without hydrocephalus induration left inferior frontal calvarium suggestive for subcutaneous hematoma without underlying calvarial fracture.    Stable small left occipital encephalomalacia suggestive for prior infarction.  Mild somewhat lobular mucosal thickening right  maxillary antra.  Remaining visualized paranasal sinuses and mastoid air cells are clear..      Impression    No acute intracranial findings specifically without evidence for acute intracranial hemorrhage or sulcal effacement to suggest large territory recent infarction    Further evaluation as warranted clinically.      Electronically signed by: Joe Arnold DO  Date:    08/04/2024  Time:    08:24   Results for orders placed or performed during the hospital encounter of 03/15/19   MRI Brain Without Contrast    Narrative    EXAMINATION:  MRI BRAIN WITHOUT CONTRAST    CLINICAL HISTORY:  Encephalopathy;.    TECHNIQUE:  Multiplanar multisequence MR imaging of the brain was performed without contrast.    COMPARISON:  CT head without contrast 03/22/2019    FINDINGS:  Intracranial compartment:    Moderate diffuse cerebral volume loss.  No evidence of hydrocephalus. No extra-axial blood or fluid collections.    Small remote infarct noted in the left occipital lobe.  No mass lesion, acute hemorrhage, edema or acute infarct.  Mild scattered increased foci of T2/FLAIR signal in the periventricular and subcortical white matter, most consistent with chronic microvascular ischemic change.    Normal vascular flow voids are preserved.    Skull/extracranial contents (limited evaluation): Mild fluid in the left mastoid air cells.      Impression    No acute abnormality.    Small remote left occipital lobe infarct.    Mild scattered white matter disease, most consistent with chronic microvascular ischemic change.    Electronically signed by resident: Archie Gould  Date:    03/23/2019  Time:    09:20    Electronically signed by: Tonio Gregory MD  Date:    03/23/2019  Time:    10:01     *Note: Due to a large number of results and/or encounters for the requested time period, some results have not been displayed. A complete set of results can be found in Results Review.       CONSULTATION:     A diagnostic psychiatric evaluation was  performed and responsiveness to treatment was assessed.  The patient demonstrates adequate ability/capacity to respond to treatment.    Inpatient consult to Psychiatry  Consult performed by: Ninoska Cruz MD  Consult ordered by: Abe Pierson MD          - The treatment team was informed of the encounter documentation.

## 2024-08-04 NOTE — ED TRIAGE NOTES
Pt presents to the ED following a fall earlier this afternoon. Pt states he landed on his R shoulder and is in 10/10 pain. Pt has multiple cuts and bruises. Pt denies hitting his head and endorses taking eliquis.

## 2024-08-04 NOTE — CARE UPDATE
"RAPID RESPONSE NURSE PROACTIVE ROUNDING NOTE       Time of Visit: 520    Admit Date: 8/3/2024  LOS: 0  Code Status: Prior   Date of Visit: 2024  : 1955  Age: 68 y.o.  Sex: male  Race: White  Bed: ED :   MRN: [id number]  Was the patient discharged from an ICU this admission? No   Was the patient discharged from a PACU within last 24 hours? No   Did the patient receive conscious sedation/general anesthesia in last 24 hours? No  Was the patient in the ED within the past 24 hours? No  Was the patient on NIPPV within the past 24 hours? No   Attending Physician: Cuauhtemoc Millan, *  Primary Service: [facility] [place] [facility] 5   Time spent at the bedside: < 15 min    SITUATION    Notified by [employer] patient alert.  Reason for alert: tachycardia  Called to evaluate the patient for Circulatory    BACKGROUND     Why is the patient in the hospital?: <principal problem not specified>    Patient has a past medical history of A-fib, Alcohol abuse, Anxiety, Arthritis, Chronic gout, Diabetes mellitus, DM (diabetes mellitus), Fatty liver, Hyperlipidemia, Renal cell cancer, and S/P TKR (total knee replacement), right.    Last Vitals:  Temp: 97.7 °F (36.5 °C) (2009)  Pulse: 124 (515)  Resp: 20 (515)  BP: 139/90 (515)  SpO2: 96 % (515)    24 Hours Vitals Range:  Temp:  [97.7 °F (36.5 °C)]   Pulse:  [101-145]   Resp:  [16-25]   BP: (124-185)/()   SpO2:  [94 %-100 %]     Labs:  Recent Labs     24  2359   WBC 14.34*   HGB 13.6*   HCT 37.8*   *       Recent Labs     24  2359   *   K 3.6   CL 96   CO2 17*   BUN 12   CREATININE 1.1   *        No results for input(s): "PH", "PCO2", "PO2", "HCO3", "POCSATURATED", "BE" in the last 72 hours.     ASSESSMENT      Physical Exam  Constitutional:       Appearance: He is ill-appearing and diaphoretic.      Interventions: Nasal cannula in place.   Cardiovascular:      Rate and Rhythm: Tachycardia present.   Pulmonary:      " Effort: Pulmonary effort is normal.      Breath sounds: Normal breath sounds.   Musculoskeletal:      Right shoulder: Tenderness present.      Comments: R shoulder sling in place    Skin:     General: Skin is warm.      Coloration: Skin is pale.      Findings: Abrasion present.      Comments: LLE abrasion to lower shin. Open and bleeding    Neurological:      Mental Status: He is lethargic and disoriented.      GCS: GCS eye subscore is 3. GCS verbal subscore is 4. GCS motor subscore is 6.         INTERVENTIONS    The patient was seen for Cardiac problem. Staff concerns included tachycardia and hypertension. The following interventions were performed: continuous cardiac monitoring continued, continuous pulse ox monitoring continued, No additional interventions needed at this time., and critical care notified of pt condition.    RECOMMENDATIONS    Close monitoring for s/s alcohol withdrawal. Recommend CIWA protocol and PRN coverage for management of withdrawal symptoms. Close monitoring of cardiac, respiratory, and neurological function    PROVIDER ESCALATION    Yes/No  Yes    Orders received and case discussed with  ZULEMA Pérez.    Disposition: Remain in room ED 20. Currently admitted to  and awaiting bed placement    FOLLOW-UP    Charge Patience HAWLEY  updated on plan of care. Instructed to call the Rapid Response Nurse, Niki Moulton RN at [phone] for additional questions or concerns.

## 2024-08-04 NOTE — DISCHARGE INSTRUCTIONS
REFERRAL RECOMMENDATIONS FOR SUBSTANCE ABUSE & MENTAL HEALTH      IN CASE OF SUICIDAL THINKING, call the National Suicide Hotline Number: 988    988 Suicide & Crisis Lifeline: 988 , 1-805-512-MTSL (4271)  https://988Movaz Networks.OptiWi-fi       SUBSTANCE ABUSE:     [facility] (formerly known as the ABU)  [x] [phone], Option 2  [x] [address] [facility] 4[facility], [place] [address]  [x] [email or web address]  [x] The [facility] delivers comprehensive and collaborative treatment for alcohol and substance use disorders.  Excellent program for working professionals or anyone else seeking recovery.  [x] Requires insurance approval prior to starting program, call number above for more information.  [x] Intensive Outpatient Rehabilitation Program - M-F 9am-3pm - daily groups with psychologists and social workers, sessions with MDs 3x per week   [x] Ambulatory detox and dual diagnosis available      SUBOXONE:     NOTE: some Suboxone clinics require their clients to participate in a structured program (such as an IOP) in order to be prescribed Suboxone.  Some clinics have a long waiting list.  Most of these clinics do not accept walk-in clients, so call first to to learn what must be done to get started on Suboxone.    [facility] - [phone] (can do Sublocade)  [address] [facility], [place] [address]    [address]  [phone]    [facility] - [phone] (can do Sublocade)  [address] [place] [address]    [facility]  [address] [place]  [phone]     [facility] (very short waiting list, may accept some walk-in's but call first if possible)  [address], [place] [address]  [phone]; [phone]    [facility]   [address] [place]    [phone]    [facility] (can usually be seen within a week but is cash only  for appointment)  [address] [place], [place]    [facility] ([facility])  [address] [facility], [address] [place] [id number] [phone]    [facility] ([facility])  [address], [place]  [phone]    [facility] ([facility])  [address] [facility] [address]  [phone]    [place], [place]:    [facility] - [address] [place] [address] - Tel: [phone]    Ramon Tovar - [address] [place] [address] - Tel: [phone]    Rafita Parker - [address] [place] [place] [address] - Tel: [phone]    Mode Foster - [address] [place] [address] - Tel: [phone]    Roderick Reesndez - [address] [place] [address] - Tel: [phone]    [place], [place]:     Dr. Catia Mackenzie and Dr. Jamel Colon - [address] [place], [place] - Tel: [phone]    Dr. Abida Bray - [address] [place], [place] - Tel: [phone]    Dr. Anup Roe - Tel: [phone]    Dr. Carlos Calvillo - [facility] - [phone]      METHADONE:     [facility] (the only methadone clinic in the [facility], has two locations)  [x] [place] [facility] [place] [address], [phone]  [x] [facility] [place] [address], [phone]    12 STEP PROGRAMS (and similar):     Alcoholics Anonymous (local)  [x] [phone]  [x] www.[place].org for schedules for in-person and online meetings  [x] There are AA meetings throughout the day all over town  [x] AA costs nothing to attend; they pass a basket for donations but this is not required    Narcotics Anonymous  [x] [phone]  [x] www.noana.org  [x] There are NA meetings throughout the day all over town  [x] NA costs nothing to attend; they pass a basket for donations but this is not required    Alcoholics Anonymous Online Intergroup (national)  [x] www.aa-intergroup.org  [x] Good resource for large, nation-wide meetings  [x] Can also attend smaller, local meetings in other cities  [x] Countless meetings  all day and all night  [x] AA costs nothing to attend; they pass a basket for donations but this is not required    Flying Sober - 24/7 zoom meetings for women and coed - sign on anytime, anywhere!  https://flyingDNagesober.AdChoice/73-4-ckybzdcx/    Online Intergroup of AA - 121 Open AA [place] Meeting - 24/7 zoom meetings  https://aa-intergroup.org/meetings/    LOOKING FOR AN ALTERNATIVE TO 12 STEP PROGRAMS - check out:  SMART Recovery: https://www.smartrecovery.org/about-us  Arturo Recovery: https://recoverydharma.org      DETOX UNITS (USUALLY 5-7 DAYS):     [facility]: [address], [place]  [phone], call first to ensure bed availability    [facility]: [address] [place]  [phone], Option 1, call first to ensure bed availability    [facility]: [facility] Kev[address]ummer, [facility]  [phone] (intake by appointment only)    [facility]: [address], [place]  [phone]      INTENSIVE OUTPATIENT PROGRAMS:     [facility] (formerly known as the ABU)  [x] [phone], Option 2  [x] [address] [facility] 4th [facility], [facility] [address]  [x] [email or web address]://[email or web address]  [x] [facility] delivers comprehensive and collaborative treatment for alcohol and substance use disorders.  Excellent program for working professionals or anyone else seeking recovery.  [x] Requires insurance approval prior to starting program, call number above for more information.  [x] Intensive Outpatient Rehabilitation Program - M-F 9am-3pm - daily groups with psychologists and social workers, sessions with MDs 3x per week   [x] Ambulatory detox and dual diagnosis available    [facility] Int[facility]  [x] [phone]  [x] [address] [facility] (the clinic not on [facility])  [x] Call number above for more info and to check insurance requirements    [address] [place] [address]  [phone]    [facility]:  [address] [facility], [address]?, [place], [place] [address]?, [phone]  [address] [facility]?, [place], [place] [address]?, [phone]    RESIDENTIAL REHABS (USUALLY 28 DAYS):     [facility]: [address], [phone]    [place] [facility]: [address] [place]  [phone] (intake by appointment only)    [facility] (men only) [address], [phone]    [facility] (Female only) [address], [phone]    [facility]: [address], [place], men's program [phone], women's program [phone]    [facility]: [address] [place] [address], [phone]    [facility]: [address], [place], [place], [phone]    [place] [facility]: Men only, [phone], [address] [facility] Dallin Sanders [facility]: [address], [place], [place], [phone]    [facility]: [address] [place] [address],  [phone]  New Location: [address] [place] [address] [address], [place] [address], [phone]    [facility]:   ?[address]?[place], [place] [address]?[phone]    [place]: [address], [place] [address], [phone]    [place]: [place], [phone]     [facility]: [place], [place], [phone]    [facility]: JEANIE Arriaga, [phone]    [facility]: [place], [place], [phone]    [place]: [place]IE Arriaga, [phone]    [facility]: [address], [place] [address], [phone]    COMMUNITY ADDICTION CLINICS:     [facility]: [address] [facility], [address] [place] [phone] -or- [address] [place] [address]    [facility] [place]: [address] [facility], [place]  [address]     [facility]: Clinics [phone]; Crisis [phone]    [facility]: [address] [place] [address]    [facility]: [address] [place] [address]    [facility]: [address] [place] [address],    [facility]: 2nd Floor [address] [place] [address]    [facility]: [address], [place] [address]    [facility], [address] [place] [address]    [facility]: [address] [facility], [place] [phone]  (serves youth 16-23 years old)    [facility]: [facility] [phone]    [facility]: [address] [facility], [place] [phone]    [facility]: [address] [facility] [phone]    [facility]: [address] [facility] [facility], [address], [phone]     [facility]: [address] [facility], [phone], [phone]    RESOURCES IN OTHER [facility]:     [facility]: [address] [facility], [phone], [phone]    [facility]: [address] [facility], [address], [phone]    [facility], [address] [place] [address], [phone]    [facility]: [address] [facility] [place], [phone]    [facility], [address] [facility], [phone] ([facility])    [facility], [address] [facility], [phone] ([facility])    [facility], [address], [place], [phone] ([facility], and [facility])    [place] [facility], [address] [place], [phone] ([facility])    [facility] Hotline [phone], [phone]    [facility], [address] [facility], [facility], [address] [place] [address]  "Blvd[address], [facility][note 3] Behavioral Health Center, [address] [facility] [address], [facility], [address] [facility] [address]., [place] [facility], [address], [place]    [facility], [address], [place] [phone]    [facility], 302 [facility] [phone]    [facility], [address] [facility] [phone]    [facility], [address] [facility], [phone]      Liberian SPEAKING (en español):     Información de la reunión de Alcohólicos Anónimos  Toro [facility], 10:00 am  Habla [facility]  Esta reunión está abierta y cualquiera puede asistir.    Fijian speaking Alcoholics anonymous meetings:  El "Toro [place] AA Skype" es un toro on line de Alcohólicos Anónimos en [facility]. El toro es lalita, gratuito y virtual a través de Skype Audio. El toro funciona mediante jaspal llamada grupal de voz, por lo que no se utiliza la videollamada, ni se pueden sanju las imágenes o rostros de los participantes. Hace chang años y medio abrimos el primer Toro de AA por Skype en [facility], comfort actualmente asisten personas desde [place], [place], [place], [place], [place], [place], [place], [place], [place], [place], [place] y [place], entre otros.    El toro es muy útil para los alcohólicos que residen en lugares donde no se celebran reuniones de AA, o residen en lugares donde las reuniones de AA son un número limitado de días a la semana, o para aquellos compañeros que se hayan de viaje o que, por cualquier motivo, se hayan convalecientes y no pueden desplazarse. Todos los días nos reuniones a las 21:00 (hora española)    Podéis obtener más información sobre el toro y lupe sesiones en la página web [email or web address]      MENTAL HEALTH:     [facility]  Department of Psychiatry - " Outpatient Clinic  [phone]    [facility] of Psychiatry - General Psychiatry Intensive Outpatient Program  [facility] (formerly known as the BMU)  [phone], option 3    [facility] Psych[facility] - Dual Diagnosis Intensive Outpatient Program  [facility] (formerly known as the ABU)  [phone], option 2      Critical access hospital MENTAL HEALTH CENTERS:     [facility]  (aka [facility])  Serves [facility].  Serves uninsured patients & those with Medicaid.  Main location: [address] [place] [id number] [phone]  Walk-in's available during regular business hours.  24/7 Crisis Line: [phone]    [facility]  (aka [facility], aka [facility])  Serves [facility].  Serves uninsured patients, those with Medicaid and some private plans.  Walk-in's available during regular business hours.  Primary care services available as well.  [facility]: [address] [facility] [place] [address];  [phone]  [place]: [address] [place] [address];  [phone]  24/7 Crisis Line: [phone]    [facility]  Serves uninsured patients & those with Medicaid, call for more info.  Primary care, pediatrics, HIV treatment, and dentistry services available as well.  Three locations.  [phone]    [facility] [place]Corporate Office  Serves patients with Medicaid, Medicare, and private insurance  [address]  [place],?[place] [address]  [phone]    [facility]  Serves uninsured on a sliding scale, as well as Medicaid, Medicare, and private plans.  Eight locations around the [facility] area.  [phone]    [facility]  Serves uninsured patients & those with Medicaid, private insurances.  Primary care  available as well.  [phone]  [address] [place] [address]    [facility] Outpatient Psychiatry  Serves veterans who were honorably discharged.  [address] [place] [address]  [phone]  24/7 Veterans Crisis Line: [phone] (Press 1)    If you have private insurance and need to find a specialist, please contact your insurance network to request a list of providers covered by your benefits.      MENTAL HEALTH/ADDICTIVE DISORDERS:     AA ([phone]), NA ([phone])   National Suicide Prevention Lifeline- Call 1-591.738.5067 Available 24 hours everyday  [facility] [phone]; Crisis Line [phone] - Call for options A-F:  Intensive Outpatient Treatment/ Day programs   [facility], please contact   [facility], please contact [phone] or [phone] to speak with an admissions counselor.  [facility] (Methadone Maintenance)   [address] [place] [address], [phone]  [address] [place] [address] [phone]  [facility], [address] [place] [address], [phone]  [place] [facility] [phone]  [place] [facility] [place] please contact [phone]  [facility], [address] [facility], 4th floor [place] [address] Phone: [phone]   [place]manuel [place] Office: [address] [address], [phone]  [place] Office: [address] [facility], [address], [place] [address], [phone]  [place] Office: [address] [place] [address] [phone]    Outpatient Substance Abuse Treatment   [facility] (Methadone Maintenance)   [address] [place] [address], [phone]  [address] [place] [address] [phone]  [facility], [address] [place] [address], [phone]  [facility]  [place] Office: [address], [place] [address], [phone]  [place]  Office: [address] [facility], [address], [place] [address], [phone]  [place] Office: [address] [facility], [place] [address] [phone]  [place] [facility], [address] [place] [address] [phone]   [facility], [address] [facility], [address], [phone]  [facility]over, [address] [place], [place] [phone]    Residential Substance Abuse Treatment   [facility] [address] [facility], [phone] or [phone]  [facility], 4150 [facility], [phone]  [facility] (men only) [address] [place] [address], [phone]  Women at the [facility] (women only) [address] [place] [address] [phone]  [facility], [address] [place] [address] [phone]  [facility] (women only), intakes at 4150 [facility], [phone]  [facility] (7-day program, $100, [address], [phone], [phone], [phone])  [place] [facility] (Men only, [phone]), [address], [place] (Vets*/Non-Vets)  [facility] (Men only, $400/month program fee) [address] [facility], [phone]  [place] (Women over age 39 only), 2407 [facility], [phone]    Out of Area:    [place], [address] [facility] [address], [place], [place] ([phone])  [facility] Recovery Program (men only), [address] [facility] [place] [address], [phone]  [facility], [address] [place], [place] ([phone])  [place], [address] [place] [address] Dr. Waite, [place] ([phone])  [facility], [address] [place] [address], [phone]  [facility] (Women only, has to have mental illness, can be homeless or substance abuser), [phone]        DOMESTIC VIOLENCE RESOURCES:     Advocacy  [place] [facility] (NOF[facility])  [address] [address]    [facility] ? [phone]  Services  provided: emergency shelter, individual advocacy, information and referrals, group support, children's program, medical advocacy, forensic medical exams, primary care, legal assistance, counseling, safety planning, and caregiver support    [facility] HEALING AND EMPOWERMENT [place]  Confidential location  [facility] ? [phone]  Services provided: short term emergency shelter, all services provided are free of charge    [facility] FOR COMMUNITY ADVOCACY  Multiple locations in [facility], [place], and [facility] ([place], [place], and [place])    [facility] ? [phone]  Services provided: emergency shelter, individual advocacy, information and referrals, group support, children's program, medical advocacy, legal assistance in obtaining restraining orders, counseling, safety planning, and caregiver support    [facility]   Emergency Shelter   [phone]  Emergency Services ,Legal and Financial Assistance Services ,Housing Services ,Support Services     [facility]   [phone]  Emergency Services ,Counseling Services , Housing Services ,Support Services ,Children's Services     WOMEN WITH A VISION  [address] [place] [address]  WWAV ? [phone]  Services provided: advocacy, health education and supportive services, specializing in free healing services for marginalized groups, including LGBTQ individuals and sex workers    SEXUAL TRAUMA AWARENESS AND RESPONSE (STAR)  [address] [place] [address]    STAR ? [phone]  Services provided: individual advocacy, information and referrals, group support, medical advocacy, legal assistance, counseling, and safety planning for survivors of sexual assault    [facility] ([facility])  [address] [place] [address]  [facility] Forensic Program ? [phone]  Services provided: free forensic medical exams for sexual assault and  domestic violence, which can be performed up to 5 days after an incident. It is not necessary to make a police report to receive a forensic medical exam    Legal  [facility]  [address], [place] [address]  [facility] ? [phone]  Services provided: free emergency legal representation for survivors of doemstic violence residing in [facility]. Legal services may include temporary restraining orders, temporary child support, custody, and use of property    [facility] LEGAL SERVICES (SLLS)  [address], [place] [address]  SLLS ? [phone]  Services provided: free legal representation for survivors of domestic violence residing in [facility]. Legal services may include temporary child support, custody, and divorce      HOTLINES:     [facility] DOMESTIC VIOLENCE HOTLINE  [phone]    Services provided: free and confidential hotline for victims and survivors of domestic violence. All calls will be routed to a domestic violence service provided in the victim or survivor's area    NATIONAL HUMAN TRAFFICKING HOTLINE  (914) 334-7339    Services provided: national anti-trafficking hotline serving victims and survivors of human trafficking. Provides information about local resources, and access to safe space to report tips, seek services, and ask for help    VIA LINK  211 or (959) [phone]    Service provided: counselors can provide crisis counseling. Counselors can also provide information and referrals to programs which can help with needs such as food, shelter, medical care, financial assistance, mental health services, substance abuse treatment, senior services, childcare, and more      HOMELESS SHELTERS:      Homeless shelters  [facility]  Emergency shelter for individuals and families  [address]  [phone]  [facility]  Emergency shelter for men only  Meals daily 6am, 2pm, & 6pm  Clothing, case management M-F by appointment  (ID/job/housing/legal assistance), mail  [address] [facility]  [phone]  [place]  Emergency shelter for men  [address] [facility]  [phone]  Emergency shelter for women  [address] [facility]  [phone]  Breakfast & lunch daily, dinner M-F  Case management, job counseling services   [facility]  Emergency shelter for teens and young adults up to 20yo  [address]  [phone]  [facility]  Emergency shelter for women over 19yo and their kids  [address] [place] [address]  [phone]  [facility]  Day program, meals M-F 1PM (arrive early)  Showers, laundry, hygiene kits, showers, phones, , notary services, case management, ID assistance  [address] [facility]  [phone] M-F 8am-2:30pm  [employer] [facility]  Day program  [facility] 7:30am-3:30pm,  8:30am-3:30pm  Crisis intervention, employment assistance, food/clothing, hygiene kits, bus tokens, mail  [address] [facility] [address]  [phone]  [facility]  Mobile outreach for homeless persons in [facility]  [phone]  Healthcare for the Homeless  Primary healthcare, case management, dental services, TB placement  Call ahead  [address] [facility] 2nd Floor  [phone]  [facility] at the [facility]  Connects homeless people with their loved ones in other cities by providing transportation costs   [phone]      [place] RESOURCES:     [place] Mobile Mental Health Crisis Response Team:    Region 12 ([place], [place], [place], and [facility]) ([facility])  [phone]      Outpatient Mental Health & Addiction Clinic Resources for both [facility]:    [facility] Mental Healthcare Resources  Website: www.pbmhr.org  Main Number: 644-082-9342    [facility] ([facility] [facility])  [address] ([address]) [place] [address]  [phone]    [facility] ([place]  Area)  [address] ([address] [facility]) [place] [address]  [phone]    [facility]  [address] ([address]) [place] [address]  [phone]    [facility]  [address] ([address] [facility]) [place] [address]  [phone]      Addiction Treatment Resources for both [facility] and [place] [place]:    [facility] (Detox, Residential, PHP, IOP, and Aftercare Programs)  [address] [place] [address]  [phone]    [facility] (Residential, IOP, Transitional Living, and Aftercare Programs)  [address] [place] [address]  [phone]    [facility] (Inpatient, Residential, Detox, IOP, Outpatient, and Aftercare Programs)  [address] [place] [address]  [phone] or toll free at [phone]      Outpatient Mental Health Psychotherapy Resources for both [facility] [place]:    Lamar Hammond, [facility]  [address]  [place] [address]  [phone]  Specialties: Depression, Anxiety, and Life Transitions    Laine Ramires, PhD  [address]  [place] [address]  [phone]  Specialties: Testing and Evaluation, Education and Learning Disabilities, and ADHD    Mariely Castle, [facility] [address] [place] [address]  [phone]  Specialties: Obsessive-Compulsive (OCD), Depression, and Relationship Issues    Rose Marie Catherine LPC [address] [place] [address]  [phone]  Specialties: Trauma & PTSD, Mood Disorders, and Anxiety    Rose Marie Ribera, PhD, [facility] [address] [place] [address]  [phone]  Specialties: Family Conflict, Child, and Relationship Issues    Humaira Dewitt LPC Counseling [place] [address] [phone]  Specialties: Anxiety, Depression, and  Anger Management        IN CASE OF SUICIDAL THINKING, call the National Suicide Hotline Number: 988    988 Suicide & Crisis Lifeline: 257 , 1-187-871-TALK (4919)  Provides 24/7, free and confidential support for people in distress, prevention and crisis resources for you or your loved ones, and best practices for professionals.    Call, text or chat.  https://988Chunk Moto.org          ------            REFERRAL RECOMMENDATIONS FOR ALCOHOL USE DISORDER      12 STEP PROGRAMS (and similar):     Alcoholics Anonymous (local)  [x] [phone]  [x] www.[place].org for schedules for in-person and online meetings  [x] There are AA meetings throughout the day all over town  [x] AA costs nothing to attend; they pass a basket for donations but this is not required    Alcoholics Anonymous Online Intergroup (national)  [x] www.aa-intergroup.org  [x] Good resource for large, nation-wide meetings  [x] Can also attend smaller, local meetings in other cities  [x] Countless meetings all day and all night  [x] AA costs nothing to attend; they pass a basket for donations but this is not required    Flying Sober - 24/7 zoom meetings for women and coed - sign on anytime, anywhere!  https://KupiBonussoberRobotsAlive/85-1-dgtqtskk/    Online Intergroup of AA - 121 Open AA [place] Meeting - 24/7 zoom meetings  https://aa-intergroup.org/meetings/    LOOKING FOR AN ALTERNATIVE TO 12 STEP PROGRAMS - check out:  SMART Recovery: https://www.smartrecovery.org/about-us  Arturo Recovery: https://recoverydharma.org      DETOX UNITS (USUALLY 5-7 DAYS):     [facility]: [address], [place]  [phone], call first to ensure bed availability    [facility]: [address] [place]  [phone], Option 1, call first to ensure bed availability    [facility]: [address], [place]  [phone] (intake by appointment only)    [facility]: [address], [place]  [phone]      INTENSIVE  OUTPATIENT PROGRAMS:     [facility] (formerly known as the ABU)  [x] [phone], Option 2  [x] [address], [facility] 4th Floor, [facility] [address]  [x] https://www.[facility].org/services/[facility]-recovery-program  [x] The [facility] delivers comprehensive and collaborative treatment for alcohol and substance use disorders.  Excellent program for working professionals or anyone else seeking recovery.  [x] Requires insurance approval prior to starting program, call number above for more information.  [x] Intensive Outpatient Rehabilitation Program - M-F 9am-3pm - daily groups with psychologists and social workers, sessions with MDs 3x per week   [x] Ambulatory detox and dual diagnosis available    [facility] Intensive Outpatient Program  [x] [phone]  [x] [facility] (the clinic not on [facility])  [x] Call number above for more info and to check insurance requirements    [facility]  [address] [place] [address] [phone]    [facility]:  [address] [facility], [address]?, [place], [place] [address]?, [phone]  [address] [facility]?, [place], [place] [address]?, [phone]    RESIDENTIAL REHABS (USUALLY 28 DAYS):     [facility]: [address], [phone]    [facility]: [address] [place] [address] , [facility]  [phone] (intake by appointment only)    [facility] (men only) [address], [facility], [phone]    [facility] (Female only) [address] [facility], [phone]    [facility]: [address], [facility], men's program [phone], women's program [phone]    [facility]: [address], [facility], [phone]    [facility]: [address], [place], [phone]    [facility]: Men only, [phone], [address] [place]    [facility]: [address], [place], [place], [phone]    [facility]: [address] [place] [address]   [place], [place],  [phone]  New Location: [address] [facility] [address], [place] [address], [phone]    [facility]:   ?[address]?[place], [place] [address]?[phone]    [place]: [address], [place] [address], [phone]    [place]: [place], [place], [phone]     [facility]: [place], [place], [phone]    [facility]: [place], [phone]    [facility]: [place], [place], [phone]    [place]: [place], [phone]    [facility]: [address] [facility], [place] [address], [phone]    COMMUNITY ADDICTION CLINICS:     [facility]: [address] [facility], [address] [place], [phone] -or- [address] [place] [address]    [facility] [place]: [address] [facility] [place]  [address]     [facility]: Clinics [phone]; Crisis [phone]    [facility]: [address] [place] [address]    [facility]: [address] [place] [address]    [facility]: [address] [place] [place] [address],    [facility]: 2nd Floor [address] [place] [address]    [facility]: [address], [facility] [address]    [facility], [place], [place] [address]    [facility]: [address] [place] [address], [facility] [phone]  (serves youth 16-23 years old)    [facility]: Tracey/St. Camacho/[place]icholas/[place] [phone]    [facility]: [address] [facility], [facility] [phone]    [facility]: [address], [facility] [phone]    [facility]: [address] [facility] [place], [address], [phone]     [facility]: [address] [facility] [address] [place], [phone], [phone]    RESOURCES IN OTHER [facility]:  "    [facility]: [address] [place], [phone], [phone]    [facility]: [address] [facility], [address], [phone]    [facility], [address] [facility], [phone]    [facility] Behavior[facility] Health: [address] [facility], [phone]    [facility], [address] [facility], [phone] ([facility])    [facility], [address], [phone] ([facility])    [facility] Health, [address], [place], [phone] ([facility], and [facility])    [facility], [address] [facility], [phone] ([facility])    [facility] Hotline [phone], [phone]    [facility], [address] [facility], [facility], [address] [facility], [address], Psychiatric hospital, demolished 2001 [facility], [address] [facility], [address] [facility] [facility]. [address]., [facility], [address], [place]    [place] [facility], [address], [address] [phone]    [facility], [address] [facility] [phone]    [facility] for [facility] Recovery, [address] [facility], [phone]    [facility]. for Addiction Recovery, [address] [facility], [phone]      Nepali SPEAKING (en español):     Información de la reunión de Alcohólicos Anónimos  [facility], 10:00 am  Habla español  Esta reunión está abierta y cualquiera puede asistir.    Burmese speaking Alcoholics anonymous meetings:  El "Giovanni [place] AA Skype" es un giovanni on line de Alcohólicos Anónimos en español. El giovanni es lalita, gratuito y virtual a " través de Skype Audio. El toro funciona mediante jaspal llamada grupal de voz, por lo que no se utiliza la videollamada, ni se pueden sanju las imágenes o rostros de los participantes. Hace chang años y medio abrimos el primer Toro de AA por Skype en Jb, comfort actualmente asisten personas desde [place], [place], [place], [place], [place], [place], [place], [place], [place], [place], [place] y [place], entre otros.    El toro es muy útil para los alcohólicos que residen en lugares donde no se celebran reuniones de AA, o residen en lugares donde las reuniones de AA son un número limitado de días a la semana, o para aquellos compañeros que se hayan de viaje o que, por cualquier motivo, se hayan convalecientes y no pueden desplazarse. Todos los días nos reuniones a las 21:00 (hora española)    Podéis obtener más información sobre el toro y lupe sesiones en la página web [email or web address]      MENTAL HEALTH/ADDICTIVE DISORDERS:     AA ([phone]), NA ([phone])   National Suicide Prevention Lifeline- Call 1-207.149.7712 Available 24 hours everyday  [facility] [phone]; Crisis Line [phone] - Call for options A-F:  Intensive Outpatient Treatment/ Day programs   [facility], please contact   [facility], please contact [phone] or [phone] to speak with an admissions counselor.  Behavioral Health Group (Methadone Maintenance)   [address] [place] [address], [phone]  [facility] [place] [address] [phone]  [facility], [address] [place] [address], [phone]  [facility] [phone]  [facility] [place] please contact [phone]  [facility], [facility] [facility], 4th floor [place] [address] Phone: [phone]   [facility]cer  [place] Office: [address] [place] [place] [address], [phone]  [place] Office: [address], [address],  [place] [address], [phone]  [place] Office: [address] [facility] [place] [address] [phone]    Outpatient Substance Abuse Treatment   Behavioral Health Group (Methadone Maintenance)   [address] [place] [address], [phone]  [address] Ave[address] [place] [address] [phone]  [facility], [address] [place] [address], [phone]  [facility]cer  [place] Office: [address] [place] [place] [address], [phone]  [place] Office: [address] [facility], [address], [place] [address], [phone]  [place] Office: [address] [facility] [facility] [place] [address] [phone]  [place] [facility], [address] [place] [address] [phone]   [facility] for [facility] Recovery, [address] [facility], [address], [phone]  [facility] for Addiction Recover, [address] [place], [place] [phone]    Residential Substance Abuse Treatment   [facility] [address] [place] [address], [phone] or [phone]  [facility], [address] [facility], [phone]  [facility] (men only) [address] [place] [address], [phone]  Women at the [facility] (women only) [address] [place] [address] [phone]  [facility], [address] [place] [address] [phone]  [facility] (women only), intakes at [address] [facility], [phone]  [facility] (7-day program, $100, [address], [place], [phone], [phone], [phone])  [facility] (Men only, [phone]), [address], [place] (Vets*/Non-Vets)  [facility] (Men only, $400/month program fee) [address] [facility] [place], [phone]  [facility] (Women over age 39 only), [address] [facility], [phone]    Out of Area:    [place], [address] [facility] [address], [place], [place] ([phone])  [facility] (men only), [address] [facility] [place] [address], [phone]  [facility], [address]  [place], [place] ([phone])  [place], [address] [place] [address] Dr. [place], [place] ([phone])  [facility], [address] [facility], [phone]  [facility] (Women only, has to have mental illness, can be homeless or substance abuser), [phone]      [place] RESOURCES:     [place] Mobile Mental Health Crisis Response Team:    Region 12 ([place], [place], [place], and [facility]) ([facility])  [phone]      Outpatient Mental Health & Addiction Clinic Resources for both [facility] and [facility]:    [facility] Mental Healthcare Resources  Website: www.[facility].org  Main Number: 023-196-4905    [facility] ([facility])  [address] ([address]) [place] [address]  [phone]    [facility] ([facility])  [address] ([address] [facility] [place]) [place] [address]  [phone]    [facility]  [address] ([address]) [place], [place] [address] [phone]    [facility]  [address] ([address] [facility]) [place] [address]  [phone]      Addiction Treatment Resources for both [facility] and [facility]:    [facility] (Detox, Residential, PHP, IOP, and Aftercare Programs)  [address] [place] [address]  [phone]    [facility] (Residential, IOP, Transitional Living, and Aftercare Programs)  [address] [place] [address]  [phone]    [place] [facility] (Inpatient, Residential, Detox, IOP, Outpatient, and Aftercare Programs)  [address], [place] [address]  [phone] or toll free at [phone]      Outpatient Mental Health Psychotherapy Resources for both [facility]:    Lamar Hammond, hospitalsW  [address]  [place] [address]  [phone]  Specialties: Depression, Anxiety, and Life Transitions    Laine Ramires, PhD  [address]  [place] [address]  [phone]  Specialties: Testing and Evaluation, Education and Learning Disabilities, and ADHD    Mariely Castle, [facility] [address] [place] [address]  [phone]  Specialties: Obsessive-Compulsive (OCD), Depression, and Relationship Issues    Rose Marie Catherine LPC [address] [facility] [address] [place] [address]  [phone]  Specialties: Trauma & PTSD, Mood Disorders, and Anxiety    Rose Marie Ribera, PhD, [facility]  [facility] [address] [place] [address]  [phone]  Specialties: Family Conflict, Child, and Relationship Issues    Humaira Dewitt [facility] Counseling [place] [address] [phone]  Specialties: Anxiety, Depression, and Anger Management        IN CASE OF SUICIDAL THINKING, call the National Suicide Hotline Number: 988    988 Suicide & Crisis Lifeline: 988 , 0-150-438-TALK (0847)  Provides 24/7, free and confidential support for people in distress, prevention and crisis resources for you or your loved ones, and best practices for professionals.    Call, text or chat.  https://988emo2 Incline.org

## 2024-08-04 NOTE — ED NOTES
Pt back to baseline LOC at this time, pt now on RA and tolerating oral secretions and maintaing O2 sats >95%

## 2024-08-04 NOTE — NURSING
..Nurses Note -- 4 Eyes      8/4/2024   6:12 PM      Skin assessed during: Admit      [] No Altered Skin Integrity Present    []Prevention Measures Documented      [x] Yes- Altered Skin Integrity Present or Discovered   [] LDA Added if Not in Epic (Describe Wound)   [x] New Altered Skin Integrity was Present on Admit and Documented in LDA   [] Wound Image Taken    Wound Care Consulted? No    Attending Nurse:  Michelle Hicks RN/Staff Member:   Cuca

## 2024-08-04 NOTE — H&P
[facility] - Emergency Dept  [facility] Medicine  History & Physical    Patient Name: Donald Warren Abadie  MRN: [id number]  Patient Class: IP- Inpatient  Admission Date: 8/3/2024  Attending Physician: Cuauhtemoc Millan, *   Primary Care Provider: Bacilio Larry MD         Patient information was obtained from  son in law and patient .     Subjective:     Principal Problem:Alcohol withdrawal syndrome with complication    Chief Complaint:   Chief Complaint   Patient presents with    Shoulder Injury     Had a fall 2 hours ago and hurt right shoulder, +numbness, skin tear to left knee. Also endorses chest pain.         HPI: Donald Warren Abadie is a 68 y.o. male with history of Afib on Eliquis and Metropolol, alcohol use disorder, type 2 diabetes, RCC presenting to emergency department with complaint of mechanical fall, and right shoulder injury.       Most of the history is gathered through the son-in-law. Patient is drowsy at bed and a poor historian. He tripped and fell on his driveway, falling onto the right side of his body. Patient said he had 2 beers in the past 24 hours but normally has 4 beers a day.  Patient's son-in-law is by bedside and states that he drinks a case of beer a day.  The son-in-law states that the patient had has had past episodes of alcohol withdrawal after having to be sober in the hospital for past hospitalizations. Patient's states that he has had nonbloody vomit twice in the past 24 hours.      Patient and denies any history of delirium tremens or withdrawal seizures. Patient denies any chest pain, dyspnea, dizziness, lightheadedness, nausea, anxiety, abdominal pain, itchiness, bug crawling sensation,     In the ER, he was given IV morphine 6 mg for pain and Ativan 2 mg twice with IV fluids. He was also administered ketamine and propofol for reduction of dislocated right shoulder. CWA is 13 at bedside.    Past Medical History:   Diagnosis Date    A-fib     Alcohol abuse     Anxiety      "Arthritis     Chronic gout     Diabetes mellitus     DM (diabetes mellitus) 11/16/2016    "boarderline, not taking any meds currently"    Fatty liver     Hyperlipidemia     Renal cell cancer 2014    S/P TKR (total knee replacement), right 06/27/2019       Past Surgical History:   Procedure Laterality Date    ABSCESS DRAINAGE      perirectal    COLONOSCOPY      ENDOSCOPIC ULTRASOUND OF UPPER GASTROINTESTINAL TRACT N/A 6/11/2024    Procedure: ULTRASOUND, UPPER GI TRACT, ENDOSCOPIC;  Surgeon: Mode Wood MD;  Location: [facility] ENDO;  Service: Endoscopy;  Laterality: N/A;  6/7 portal[place]EUS [place] 7-10 days [place] [facility] [place] [facility] [place]  Te 6/7-tt  6/10-precall complete-MS    HAND SURGERY Left     JOINT REPLACEMENT      knee replacement right knee    KIDNEY SURGERY      partial left kidney removal - CA    PARTIAL NEPHRECTOMY Left August 2014    PERCUTANEOUS CRYOTHERAPY OF PERIPHERAL NERVE USING LIQUID NITROUS OXIDE IN CLOSED NEEDLE DEVICE Right 6/17/2019    Procedure: CRYOTHERAPY, NERVE, PERIPHERAL, PERCUTANEOUS, USING LIQUID NITROUS OXIDE IN CLOSED NEEDLE DEVICE-right knee iovera;  Surgeon: Donny Hair III, MD;  Location: [facility] CATH LAB;  Service: Pain Management;  Laterality: Right;    TOTAL KNEE ARTHROPLASTY Right 6/27/2019    Procedure: ARTHROPLASTY, KNEE, TOTAL-SAME DAY;  Surgeon: Mikael Huerta MD;  Location: [facility] OR [address] [place] [address];  Service: Orthopedics;  Laterality: Right;       Review of patient's allergies indicates:   Allergen Reactions    No known drug allergies        Current Facility-Administered Medications on File Prior to Encounter   Medication    triamcinolone acetonide injection 40 mg     Current Outpatient Medications on File Prior to Encounter   Medication Sig    allopurinoL (ZYLOPRIM) 100 MG tablet TAKE 2 TABLETS(200 MG) BY MOUTH EVERY DAY    cyanocobalamin (VITAMIN B-12) 1000 MCG tablet Take 1 tablet (1,000 mcg total) by mouth once daily.    diclofenac sodium (VOLTAREN) 1 % Gel " Apply 2 g topically 3 (three) times daily as needed (Right knee - hand pain).    [START ON 8/6/2024] dronedarone (MULTAQ) 400 mg Tab Take 1 tablet (400 mg total) by mouth 2 (two) times daily with meals.    ELIQUIS 5 mg Tab TAKE 1 TABLET BY MOUTH TWICE DAILY (Patient taking differently: Take 5 mg by mouth 2 (two) times daily.)    folic acid (FOLVITE) 1 MG tablet Take 1 tablet (1 mg total) by mouth once daily.    gabapentin (NEURONTIN) 300 MG capsule Take 1 capsule (300 mg total) by mouth 2 (two) times daily.    ketoconazole (NIZORAL) 2 % cream Apply topically once daily. Apply to affected skin from toes to heels twice a day for 3-4 weeks.    magnesium oxide (MAG-OX) 400 mg (241.3 mg magnesium) tablet Take 1 tablet (400 mg total) by mouth once daily.    metFORMIN (GLUCOPHAGE-XR) 500 MG ER 24hr tablet Take 2 tablets (1,000 mg total) by mouth 2 (two) times daily with meals.    metoprolol succinate (TOPROL-XL) 25 MG 24 hr tablet Take 1 tablet (25 mg total) by mouth once daily.    multivitamin (THERAGRAN) tablet Take 1 tablet by mouth once daily.    omega-3 acid ethyl esters (LOVAZA) 1 gram capsule Take 2 g by mouth 2 (two) times daily.    pantoprazole (PROTONIX) 40 MG tablet Take 1 tablet (40 mg total) by mouth once daily.    rOPINIRole (REQUIP) 1 MG tablet TAKE 1 TABLET(1 MG) BY MOUTH EVERY EVENING    rosuvastatin (CRESTOR) 20 MG tablet TAKE 1 TABLET(20 MG) BY MOUTH EVERY DAY    sertraline (ZOLOFT) 100 MG tablet Take 1 tablet (100 mg total) by mouth once daily.    tamsulosin (FLOMAX) 0.4 mg Cap TAKE 1 CAPSULE(0.4 MG) BY MOUTH EVERY DAY (Patient taking differently: Take 0.4 mg by mouth every evening.)    VITAMIN B-1 100 MG tablet TAKE 1 TABLET BY MOUTH ONCE DAILY     Family History       Problem Relation (Age of Onset)    Alcohol abuse Brother    Alzheimer's disease Mother, Brother    Cancer Father, Sister    Colon cancer Father    Colon polyps Brother    Diabetes Mother    Lung cancer Father, Sister          Tobacco Use     Smoking status: Never    Smokeless tobacco: Never   Substance and Sexual Activity    Alcohol use: Yes     Alcohol/week: 168.0 standard drinks of alcohol     Types: 168 Cans of beer per week     Comment: 12-24  beers daily    Drug use: No    Sexual activity: Not Currently     Partners: Female     Review of Systems   Reason unable to perform ROS: Limited 2/2 sedation.   Constitutional:  Positive for diaphoresis.   Respiratory:  Negative for cough, shortness of breath, wheezing and stridor.    Cardiovascular:  Negative for chest pain.   Gastrointestinal:  Negative for abdominal pain, nausea and vomiting.   Skin:  Positive for wound.        Scrape on Left shin-   Neurological:  Negative for dizziness, seizures and light-headedness.     Objective:     Vital Signs (Most Recent):  Temp: 97.7 °F (36.5 °C) (08/03/24 2009)  Pulse: (!) 124 (08/04/24 0515)  Resp: 20 (08/04/24 0515)  BP: (!) 139/90 (08/04/24 0515)  SpO2: 96 % (08/04/24 0515) Vital Signs (24h Range):  Temp:  [97.7 °F (36.5 °C)] 97.7 °F (36.5 °C)  Pulse:  [101-145] 124  Resp:  [16-25] 20  SpO2:  [94 %-100 %] 96 %  BP: (124-185)/() 139/90     Weight: 79 kg (174 lb 2.6 oz)  Body mass index is 28.11 kg/m².     Physical Exam  Constitutional:       Appearance: He is ill-appearing.   Cardiovascular:      Rate and Rhythm: Tachycardia present. Rhythm irregular.      Heart sounds: Normal heart sounds.   Pulmonary:      Breath sounds: No stridor. No wheezing or rales.   Abdominal:      Tenderness: There is no abdominal tenderness. There is no guarding or rebound.   Musculoskeletal:         General: Signs of injury present.   Neurological:      Mental Status: He is disoriented.                Significant Labs: All pertinent labs within the past 24 hours have been reviewed.  CBC:   Recent Labs   Lab 08/03/24  2359   WBC 14.34*   HGB 13.6*   HCT 37.8*   *     CMP:   Recent Labs   Lab 08/03/24  2359   *   K 3.6   CL 96   CO2 17*   *   BUN 12    CREATININE 1.1   CALCIUM 8.8   PROT 7.1   ALBUMIN 4.1   BILITOT 0.8   ALKPHOS 59   AST 50*   ALT 34   ANIONGAP 17*     Alcohol level 84    Significant Imaging: I have reviewed all pertinent imaging results/findings within the past 24 hours.  Multiple trauma Xrays reviewed, no fracture dislocation noted.    Assessment/Plan:     * Alcohol withdrawal syndrome with complication  -patient received Ativan 2 mg twice and IV fluids in the ER.    -thiamine 100 mg p.o. and folate 1 mg  -Valium 10 mg p.o. t.i.d.  -Ativan 2 mg IV q.4 PRN when CIWA is more than 8  -CIWA Q 4 H  -continue to monitor for possible withdrawal or DT            Acute encephalopathy  - Alcohol vs polypharmacy vs brain bleed vs metabolic encephalopathy      Generalized anxiety disorder  -will continue sertraline      GERD (gastroesophageal reflux disease)  PPI      Atrial fibrillation with RVR  Patient with atrial fibrillation RVR currently with metoprolol and Eliquis.  - Eliquis is held temporarily and will be resumed if CT head shows no evidence of brain bleed.  -Will Continue Metropolol and Multaq    Hyperlipidemia associated with type 2 diabetes mellitus  -will continue statin        VTE Risk Mitigation (From admission, onward)           Ordered     IP VTE HIGH RISK PATIENT  Once         08/04/24 0635     Place sequential compression device  Until discontinued         08/04/24 0635                                    Katiuska Le MD  Department of Hospital Medicine  [facility] - Emergency Dept

## 2024-08-04 NOTE — SUBJECTIVE & OBJECTIVE
"Past Medical History:   Diagnosis Date    A-fib     Alcohol abuse     Anxiety     Arthritis     Chronic gout     Diabetes mellitus     DM (diabetes mellitus) 11/16/2016    "boarderline, not taking any meds currently"    Fatty liver     Hyperlipidemia     Renal cell cancer 2014    S/P TKR (total knee replacement), right 06/27/2019       Past Surgical History:   Procedure Laterality Date    ABSCESS DRAINAGE      perirectal    COLONOSCOPY      ENDOSCOPIC ULTRASOUND OF UPPER GASTROINTESTINAL TRACT N/A 6/11/2024    Procedure: ULTRASOUND, UPPER GI TRACT, ENDOSCOPIC;  Surgeon: Mode Wood MD;  Location: [facility] ENDO;  Service: Endoscopy;  Laterality: N/A;  6/7 portal-EUS in 7-10 days please, [facility] or [facility] approved  Te 6/7-tt  6/10-precall complete-MS    HAND SURGERY Left     JOINT REPLACEMENT      knee replacement right knee    KIDNEY SURGERY      partial left kidney removal - [place]    PARTIAL NEPHRECTOMY Left August 2014    PERCUTANEOUS CRYOTHERAPY OF PERIPHERAL NERVE USING LIQUID NITROUS OXIDE IN CLOSED NEEDLE DEVICE Right 6/17/2019    Procedure: CRYOTHERAPY, NERVE, PERIPHERAL, PERCUTANEOUS, USING LIQUID NITROUS OXIDE IN CLOSED NEEDLE DEVICE-right knee iovera;  Surgeon: Donny Hair III, MD;  Location: [facility] CATH LAB;  Service: Pain Management;  Laterality: Right;    TOTAL KNEE ARTHROPLASTY Right 6/27/2019    Procedure: ARTHROPLASTY, KNEE, TOTAL-SAME DAY;  Surgeon: Mikael Huerta MD;  Location: [facility] OR [address] [place] [address];  Service: Orthopedics;  Laterality: Right;       Review of patient's allergies indicates:   Allergen Reactions    No known drug allergies        Current Facility-Administered Medications on File Prior to Encounter   Medication    triamcinolone acetonide injection 40 mg     Current Outpatient Medications on File Prior to Encounter   Medication Sig    allopurinoL (ZYLOPRIM) 100 MG tablet TAKE 2 TABLETS(200 MG) BY MOUTH EVERY DAY    cyanocobalamin (VITAMIN B-12) 1000 MCG tablet Take 1 " tablet (1,000 mcg total) by mouth once daily.    diclofenac sodium (VOLTAREN) 1 % Gel Apply 2 g topically 3 (three) times daily as needed (Right knee - hand pain).    [START ON 8/6/2024] dronedarone (MULTAQ) 400 mg Tab Take 1 tablet (400 mg total) by mouth 2 (two) times daily with meals.    ELIQUIS 5 mg Tab TAKE 1 TABLET BY MOUTH TWICE DAILY (Patient taking differently: Take 5 mg by mouth 2 (two) times daily.)    folic acid (FOLVITE) 1 MG tablet Take 1 tablet (1 mg total) by mouth once daily.    gabapentin (NEURONTIN) 300 MG capsule Take 1 capsule (300 mg total) by mouth 2 (two) times daily.    ketoconazole (NIZORAL) 2 % cream Apply topically once daily. Apply to affected skin from toes to heels twice a day for 3-4 weeks.    magnesium oxide (MAG-OX) 400 mg (241.3 mg magnesium) tablet Take 1 tablet (400 mg total) by mouth once daily.    metFORMIN (GLUCOPHAGE-XR) 500 MG ER 24hr tablet Take 2 tablets (1,000 mg total) by mouth 2 (two) times daily with meals.    metoprolol succinate (TOPROL-XL) 25 MG 24 hr tablet Take 1 tablet (25 mg total) by mouth once daily.    multivitamin (THERAGRAN) tablet Take 1 tablet by mouth once daily.    omega-3 acid ethyl esters (LOVAZA) 1 gram capsule Take 2 g by mouth 2 (two) times daily.    pantoprazole (PROTONIX) 40 MG tablet Take 1 tablet (40 mg total) by mouth once daily.    rOPINIRole (REQUIP) 1 MG tablet TAKE 1 TABLET(1 MG) BY MOUTH EVERY EVENING    rosuvastatin (CRESTOR) 20 MG tablet TAKE 1 TABLET(20 MG) BY MOUTH EVERY DAY    sertraline (ZOLOFT) 100 MG tablet Take 1 tablet (100 mg total) by mouth once daily.    tamsulosin (FLOMAX) 0.4 mg Cap TAKE 1 CAPSULE(0.4 MG) BY MOUTH EVERY DAY (Patient taking differently: Take 0.4 mg by mouth every evening.)    VITAMIN B-1 100 MG tablet TAKE 1 TABLET BY MOUTH ONCE DAILY     Family History       Problem Relation (Age of Onset)    Alcohol abuse Brother    Alzheimer's disease Mother, Brother    Cancer Father, Sister    Colon cancer Father     Colon polyps Brother    Diabetes Mother    Lung cancer Father, Sister          Tobacco Use    Smoking status: Never    Smokeless tobacco: Never   Substance and Sexual Activity    Alcohol use: Yes     Alcohol/week: 168.0 standard drinks of alcohol     Types: 168 Cans of beer per week     Comment: 12-24  beers daily    Drug use: No    Sexual activity: Not Currently     Partners: Female     Review of Systems   Reason unable to perform ROS: Limited 2/2 sedation.   Constitutional:  Positive for diaphoresis.   Respiratory:  Negative for cough, shortness of breath, wheezing and stridor.    Cardiovascular:  Negative for chest pain.   Gastrointestinal:  Negative for abdominal pain, nausea and vomiting.   Skin:  Positive for wound.        Scrape on Left shin-   Neurological:  Negative for dizziness, seizures and light-headedness.     Objective:     Vital Signs (Most Recent):  Temp: 97.7 °F (36.5 °C) (08/03/24 2009)  Pulse: (!) 124 (08/04/24 0515)  Resp: 20 (08/04/24 0515)  BP: (!) 139/90 (08/04/24 0515)  SpO2: 96 % (08/04/24 0515) Vital Signs (24h Range):  Temp:  [97.7 °F (36.5 °C)] 97.7 °F (36.5 °C)  Pulse:  [101-145] 124  Resp:  [16-25] 20  SpO2:  [94 %-100 %] 96 %  BP: (124-185)/() 139/90     Weight: 79 kg (174 lb 2.6 oz)  Body mass index is 28.11 kg/m².     Physical Exam  Constitutional:       Appearance: He is ill-appearing.   Cardiovascular:      Rate and Rhythm: Tachycardia present. Rhythm irregular.      Heart sounds: Normal heart sounds.   Pulmonary:      Breath sounds: No stridor. No wheezing or rales.   Abdominal:      Tenderness: There is no abdominal tenderness. There is no guarding or rebound.   Musculoskeletal:         General: Signs of injury present.   Neurological:      Mental Status: He is disoriented.                Significant Labs: All pertinent labs within the past 24 hours have been reviewed.  CBC:   Recent Labs   Lab 08/03/24  2359   WBC 14.34*   HGB 13.6*   HCT 37.8*   *     CMP:    Recent Labs   Lab 08/03/24  2359   *   K 3.6   CL 96   CO2 17*   *   BUN 12   CREATININE 1.1   CALCIUM 8.8   PROT 7.1   ALBUMIN 4.1   BILITOT 0.8   ALKPHOS 59   AST 50*   ALT 34   ANIONGAP 17*     Alcohol level 84    Significant Imaging: I have reviewed all pertinent imaging results/findings within the past 24 hours.  Multiple trauma Xrays reviewed, no fracture dislocation noted.

## 2024-08-04 NOTE — HPI
Donald Warren Abadie is a 68 y.o. male with history of Afib on Eliquis and Metropolol, alcohol use disorder, type 2 diabetes, RCC presenting to emergency department with complaint of mechanical fall, and right shoulder injury.       Most of the history is gathered through the son-in-law. Patient is drowsy at bed and a poor historian. He tripped and fell on his driveway, falling onto the right side of his body. Patient said he had 2 beers in the past 24 hours but normally has 4 beers a day.  Patient's son-in-law is by bedside and states that he drinks a case of beer a day.  The son-in-law states that the patient had has had past episodes of alcohol withdrawal after having to be sober in the hospital for past hospitalizations. Patient's states that he has had nonbloody vomit twice in the past 24 hours.      Patient and denies any history of delirium tremens or withdrawal seizures. Patient denies any chest pain, dyspnea, dizziness, lightheadedness, nausea, anxiety, abdominal pain, itchiness, bug crawling sensation,     In the ER, he was given IV morphine 6 mg for pain and Ativan 2 mg twice with IV fluids. He was also administered ketamine and propofol for reduction of dislocated right shoulder. CWA is 13 at bedside.

## 2024-08-04 NOTE — ED PROVIDER NOTES
Source of History:  Patient  Chart    Chief complaint:  Shoulder Injury (Had a fall 2 hours ago and hurt right shoulder, +numbness, skin tear to left knee. Also endorses chest pain. )      HPI:  Donald Warren Abadie is a 68 y.o. male with history of atrial fibrillation, alcohol use disorder, type 2 diabetes, gout, GERD, anticoagulated on Eliquis, presenting to emergency department with complaint of  fall, right shoulder injury.      Rough patient states he was drinking alcohol throughout the day today.  He tripped and fell on his driveway, falling onto the right side of his body.  Denies hitting his head or losing consciousness.  No neck or back pain.  He was have pain in the right shoulder, throughout the right arm, and in the right anterior chest wall.  No difficulty breathing.  No new abdominal pain.  Triage note states he feels numb, he denies this.  Last tetanus booster was in 2021.  He does request medication for his pain.    Review of patient's allergies indicates:   Allergen Reactions    No known drug allergies        Current Facility-Administered Medications on File Prior to Encounter   Medication Dose Route Frequency Provider Last Rate Last Admin    [DISCONTINUED] triamcinolone acetonide injection 40 mg  40 mg Intramuscular 1 time in Clinic/HOD          Current Outpatient Medications on File Prior to Encounter   Medication Sig Dispense Refill    allopurinoL (ZYLOPRIM) 100 MG tablet TAKE 2 TABLETS(200 MG) BY MOUTH EVERY  tablet 0    cyanocobalamin (VITAMIN B-12) 1000 MCG tablet Take 1 tablet (1,000 mcg total) by mouth once daily.      diclofenac sodium (VOLTAREN) 1 % Gel Apply 2 g topically 3 (three) times daily as needed (Right knee - hand pain).      dronedarone (MULTAQ) 400 mg Tab Take 1 tablet (400 mg total) by mouth 2 (two) times daily with meals. (Patient taking differently: Take 400 mg by mouth once daily.) 60 tablet 11    ELIQUIS 5 mg Tab TAKE 1 TABLET BY MOUTH TWICE DAILY (Patient taking  differently: Take 5 mg by mouth 2 (two) times daily.) 180 tablet 3    gabapentin (NEURONTIN) 300 MG capsule Take 1 capsule (300 mg total) by mouth 2 (two) times daily. (Patient taking differently: Take 300 mg by mouth once daily.) 60 capsule 1    HYDROcodone-acetaminophen (NORCO) 5-325 mg per tablet Take 1 tablet by mouth 2 (two) times daily as needed for Pain.      magnesium oxide (MAG-OX) 400 mg (241.3 mg magnesium) tablet Take 1 tablet (400 mg total) by mouth once daily. 14 tablet 0    metFORMIN (GLUCOPHAGE-XR) 500 MG ER 24hr tablet Take 2 tablets (1,000 mg total) by mouth 2 (two) times daily with meals. (Patient taking differently: Take 250 mg by mouth 2 (two) times daily with meals.) 360 tablet 3    metoprolol succinate (TOPROL-XL) 25 MG 24 hr tablet Take 1 tablet (25 mg total) by mouth once daily. 90 tablet 3    multivitamin (THERAGRAN) tablet Take 1 tablet by mouth once daily.      omega-3 acid ethyl esters (LOVAZA) 1 gram capsule Take 2 g by mouth 2 (two) times daily.      pantoprazole (PROTONIX) 40 MG tablet Take 1 tablet (40 mg total) by mouth once daily. 90 tablet 3    rosuvastatin (CRESTOR) 20 MG tablet TAKE 1 TABLET(20 MG) BY MOUTH EVERY DAY 90 tablet 3    sertraline (ZOLOFT) 100 MG tablet Take 1 tablet (100 mg total) by mouth once daily. 90 tablet 3    tamsulosin (FLOMAX) 0.4 mg Cap TAKE 1 CAPSULE(0.4 MG) BY MOUTH EVERY DAY (Patient taking differently: Take 0.4 mg by mouth every evening.) 90 capsule 3    folic acid (FOLVITE) 1 MG tablet Take 1 tablet (1 mg total) by mouth once daily. 30 tablet 11    ketoconazole (NIZORAL) 2 % cream Apply topically once daily. Apply to affected skin from toes to heels twice a day for 3-4 weeks. 60 g 6    rOPINIRole (REQUIP) 1 MG tablet TAKE 1 TABLET(1 MG) BY MOUTH EVERY EVENING (Patient taking differently: Take 1 tablet by mouth daily as needed (restless leg).) 90 tablet 3    VITAMIN B-1 100 MG tablet TAKE 1 TABLET BY MOUTH ONCE DAILY 30 tablet 2       PMH:  As per HPI  "and below:  Past Medical History:   Diagnosis Date    A-fib     Alcohol abuse     Anxiety     Arthritis     Chronic gout     Diabetes mellitus     DM (diabetes mellitus) 11/16/2016    "boarderline, not taking any meds currently"    Fatty liver     Hyperlipidemia     Renal cell cancer 2014    S/P TKR (total knee replacement), right 06/27/2019     Past Surgical History:   Procedure Laterality Date    ABSCESS DRAINAGE      perirectal    COLONOSCOPY      ENDOSCOPIC ULTRASOUND OF UPPER GASTROINTESTINAL TRACT N/A 6/11/2024    Procedure: ULTRASOUND, UPPER GI TRACT, ENDOSCOPIC;  Surgeon: Mode Wood MD;  Location: [facility] ENDO;  Service: Endoscopy;  Laterality: N/A;  6/7 portal-EUS in 7-10 days please, [facility] [place], [place] or Nicole-eliquis approved  Te 6/7-tt  6/10-precall complete-MS    HAND SURGERY Left     JOINT REPLACEMENT      knee replacement right knee    KIDNEY SURGERY      partial left kidney removal - [place]    PARTIAL NEPHRECTOMY Left August 2014    PERCUTANEOUS CRYOTHERAPY OF PERIPHERAL NERVE USING LIQUID NITROUS OXIDE IN CLOSED NEEDLE DEVICE Right 6/17/2019    Procedure: CRYOTHERAPY, NERVE, PERIPHERAL, PERCUTANEOUS, USING LIQUID NITROUS OXIDE IN CLOSED NEEDLE DEVICE-right knee iovera;  Surgeon: Donny Hair III, MD;  Location: [facility] CATH LAB;  Service: Pain Management;  Laterality: Right;    TOTAL KNEE ARTHROPLASTY Right 6/27/2019    Procedure: ARTHROPLASTY, KNEE, TOTAL-SAME DAY;  Surgeon: Mikael Huerta MD;  Location: [facility] OR [address] [place] [address];  Service: Orthopedics;  Laterality: Right;       Social History     Socioeconomic History    Marital status: Single   Occupational History     Employer: [employer]   Tobacco Use    Smoking status: Never    Smokeless tobacco: Never   Substance and Sexual Activity    Alcohol use: Yes     Alcohol/week: 168.0 standard drinks of alcohol     Types: 168 Cans of beer per week     Comment: 12-24  beers daily    Drug use: No    Sexual activity: Not Currently     Partners: " Female     Social Determinants of Health     Financial Resource Strain: Low Risk  (8/5/2024)    Overall Financial Resource Strain (CARDIA)     Difficulty of Paying Living Expenses: Not very hard   Food Insecurity: No Food Insecurity (8/5/2024)    Hunger Vital Sign     Worried About Running Out of Food in the Last Year: Never true     Ran Out of Food in the Last Year: Never true   Transportation Needs: No Transportation Needs (8/5/2024)    TRANSPORTATION NEEDS     Transportation : No   Physical Activity: Inactive (8/5/2024)    Exercise Vital Sign     Days of Exercise per Week: 0 days     Minutes of Exercise per Session: 0 min   Stress: No Stress Concern Present (5/16/2024)    [employer] [place] [employer] - Occupational Stress Questionnaire     Feeling of Stress : Only a little   Recent Concern: Stress - Stress Concern Present (4/2/2024)    [employer] [place] [employer] - Occupational Stress Questionnaire     Feeling of Stress : To some extent   Housing Stability: Low Risk  (8/5/2024)    Housing Stability Vital Sign     Unable to Pay for Housing in the Last Year: No     Homeless in the Last Year: No       Family History   Problem Relation Name Age of Onset    Lung cancer Father      Colon cancer Father      Cancer Father          lung cancer    Diabetes Mother      Alzheimer's disease Mother      Lung cancer Sister      Cancer Sister          lung    Colon polyps Brother      Alcohol abuse Brother      Alzheimer's disease Brother      Cirrhosis Neg Hx         Physical Exam:      Vitals:    08/05/24 1530   BP: 128/70   Pulse:    Resp: 18   Temp: 98.9 °F (37.2 °C)     Gen: No acute distress. Nontoxic.  Mental Status:  Alert and oriented x 3.  Appropriate, conversant.  Skin: Warm, dry. No rashes seen.   Pulm: No increased work of breathing.  CV: Normal peripheral perfusion.  MSK: Right shoulder deformity  Neuro: Awake. Speech normal. No focal neuro deficit observed.      Laboratory Studies:  Labs  Reviewed   CBC W/ AUTO DIFFERENTIAL - Abnormal       Result Value    WBC 14.34 (*)     RBC 4.13 (*)     Hemoglobin 13.6 (*)     Hematocrit 37.8 (*)     MCV 92      MCH 32.9 (*)     MCHC 36.0      RDW 13.9      Platelets 134 (*)     MPV 8.6 (*)     Immature Granulocytes 0.5      Gran # (ANC) 11.6 (*)     Immature Grans (Abs) 0.07 (*)     Lymph # 1.7      Mono # 1.0      Eos # 0.0      Baso # 0.03      nRBC 0      Gran % 81.2 (*)     Lymph % 11.5 (*)     Mono % 6.6      Eosinophil % 0.0      Basophil % 0.2      Differential Method Automated     COMPREHENSIVE METABOLIC PANEL - Abnormal    Sodium 130 (*)     Potassium 3.6      Chloride 96      CO2 17 (*)     Glucose 136 (*)     BUN 12      Creatinine 1.1      Calcium 8.8      Total Protein 7.1      Albumin 4.1      Total Bilirubin 0.8      Alkaline Phosphatase 59      AST 50 (*)     ALT 34      eGFR >60.0      Anion Gap 17 (*)    ALCOHOL,MEDICAL (ETHANOL) - Abnormal    Alcohol, Serum 84 (*)    COMPREHENSIVE METABOLIC PANEL - Abnormal    Sodium 134 (*)     Potassium 3.2 (*)     Chloride 101      CO2 23      Glucose 154 (*)     BUN 11      Creatinine 1.1      Calcium 8.7      Total Protein 6.7      Albumin 3.8      Total Bilirubin 1.3 (*)     Alkaline Phosphatase 53 (*)     AST 42 (*)     ALT 29      eGFR >60.0      Anion Gap 10     CBC W/ AUTO DIFFERENTIAL - Abnormal    WBC 12.00      RBC 4.12 (*)     Hemoglobin 13.5 (*)     Hematocrit 39.0 (*)     MCV 95      MCH 32.8 (*)     MCHC 34.6      RDW 14.4      Platelets 97 (*)     MPV 8.2 (*)     Immature Granulocytes 0.4      Gran # (ANC) 9.1 (*)     Immature Grans (Abs) 0.05 (*)     Lymph # 1.8      Mono # 1.1 (*)     Eos # 0.0      Baso # 0.01      nRBC 0      Gran % 75.4 (*)     Lymph % 14.9 (*)     Mono % 9.2      Eosinophil % 0.0      Basophil % 0.1      Differential Method Automated     MAGNESIUM - Abnormal    Magnesium 1.3 (*)    ISTAT PROCEDURE - Abnormal    POC PH 7.429      POC PCO2 34.0 (*)     POC PO2 89      POC  HCO3 22.5 (*)     POC BE -2      POC SATURATED O2 97      POC TCO2 24      Sample ARTERIAL      Site LR      Allens Test Pass      DelSys Room Air      Mode SPONT     AMMONIA    Ammonia 34     PHOSPHORUS    Phosphorus 3.2     LACTIC ACID, PLASMA    Lactate (Lactic Acid) 1.2     TROPONIN I    Troponin I <0.006         X-rays (independently interpreted by me):  shoulder dislocation    Chart reviewed.     Imaging Results              X-Ray Knee 1 or 2 View Left (Final result)  Result time 08/04/24 10:22:42      Final result by Emil Zheng Jr., MD (08/04/24 10:22:42)                   Impression:      Degenerative change left medial femoral tibial compartment.      Electronically signed by: Emil Zheng MD  Date:    08/04/2024  Time:    10:22               Narrative:    EXAMINATION:  XR KNEE 1 OR 2 VIEW LEFT    CLINICAL HISTORY:  fall;    TECHNIQUE:  One or two views of the left knee were performed.    COMPARISON:  Ortho knees November 2019    FINDINGS:  There is narrowing of the left medial femoral tibial compartment.  No significant effusion.                                       CT Head Without Contrast (Final result)  Result time 08/04/24 08:24:57      Final result by Joe Arnold DO (08/04/24 08:24:57)                   Impression:      No acute intracranial findings specifically without evidence for acute intracranial hemorrhage or sulcal effacement to suggest large territory recent infarction    Further evaluation as warranted clinically.      Electronically signed by: Joe Arnold DO  Date:    08/04/2024  Time:    08:24               Narrative:    EXAMINATION:  CT HEAD WITHOUT CONTRAST    CLINICAL HISTORY:  Head trauma, minor (Age >= 65y);    TECHNIQUE:  Multiple sequential 5 mm axial images of the head without contrast.  Coronal and sagittal reformatted imaging from the axial acquisition.    COMPARISON:  11/29/2022    FINDINGS:  No evidence for acute intracranial hemorrhage or sulcal effacement to  suggest large territory recent infarction.  Mild generalized cerebral volume loss with compensatory enlargement ventricles sulci and cisterns without hydrocephalus induration left inferior frontal calvarium suggestive for subcutaneous hematoma without underlying calvarial fracture.    Stable small left occipital encephalomalacia suggestive for prior infarction.  Mild somewhat lobular mucosal thickening right maxillary antra.  Remaining visualized paranasal sinuses and mastoid air cells are clear..                                       X-Ray Shoulder Trauma Right (Final result)  Result time 08/04/24 08:13:54      Final result by Emil Zheng Jr., MD (08/04/24 08:13:54)                   Impression:      See above      Electronically signed by: Emil Zheng MD  Date:    08/04/2024  Time:    08:13               Narrative:    EXAMINATION:  XR SHOULDER TRAUMA 3 VIEW RIGHT    CLINICAL HISTORY:  Unspecified injury of shoulder and upper arm, unspecified arm, initial encounter    TECHNIQUE:  Three or four views of the right shoulder were performed.    COMPARISON:  August 4, 2024 at 00:44    FINDINGS:  Humeral head appears to be within the glenoid fossa on these 2 images which are similar in projection.  Degenerative change AC joint.  No convincing fracture.                                       X-Ray Ribs 2 View Right (Final result)  Result time 08/04/24 02:35:48      Final result by Quique Conte MD (08/04/24 02:35:48)                   Impression:      Stable and negative chest.    No acute findings evident in the right ribs.    Right shoulder dislocation, described on separate study.      Electronically signed by: Quique Conte  Date:    08/04/2024  Time:    02:35               Narrative:    EXAMINATION:  XR CHEST 1 VIEW; XR RIBS 2 VIEW RIGHT    CLINICAL HISTORY:  Unspecified injury of thorax, initial encounter    TECHNIQUE:  Single frontal view of the chest was performed.  Two additional views of the right  ribs were obtained.    COMPARISON:  Chest x-ray, 10/28/2023    FINDINGS:  The heart is not enlarged the trachea is midline.  The lungs and pleural spaces are clear.  Bony thorax appears intact.    Detailed views right ribs demonstrate displaced rib fracture.  There is no effusion or pneumothorax.  Dislocated right shoulder noted.                                       X-Ray Chest 1 View (Final result)  Result time 08/04/24 02:35:48      Final result by Quique Conte MD (08/04/24 02:35:48)                   Impression:      Stable and negative chest.    No acute findings evident in the right ribs.    Right shoulder dislocation, described on separate study.      Electronically signed by: Quique Conte  Date:    08/04/2024  Time:    02:35               Narrative:    EXAMINATION:  XR CHEST 1 VIEW; XR RIBS 2 VIEW RIGHT    CLINICAL HISTORY:  Unspecified injury of thorax, initial encounter    TECHNIQUE:  Single frontal view of the chest was performed.  Two additional views of the right ribs were obtained.    COMPARISON:  Chest x-ray, 10/28/2023    FINDINGS:  The heart is not enlarged the trachea is midline.  The lungs and pleural spaces are clear.  Bony thorax appears intact.    Detailed views right ribs demonstrate displaced rib fracture.  There is no effusion or pneumothorax.  Dislocated right shoulder noted.                                       X-Ray Hand 3 View Right (Final result)  Result time 08/04/24 02:33:42      Final result by Quique Conte MD (08/04/24 02:33:42)                   Impression:      The anterior inferior shoulder dislocation with impaction in Hill-Sachs deformity on the humeral head.  No definite fracture.    Degenerative changes at the elbow, AC joint, wrist and hand.      Electronically signed by: Quique Conte  Date:    08/04/2024  Time:    02:33               Narrative:    EXAMINATION:  XR SHOULDER TRAUMA 3 VIEW RIGHT; XR HUMERUS 2 VIEW RIGHT; XR ELBOW COMPLETE 3 VIEW RIGHT;  XR WRIST COMPLETE 3 VIEWS RIGHT; XR HAND COMPLETE 3 VIEW RIGHT    CLINICAL HISTORY:  hand injury;Unspecified injury of shoulder and upper arm, unspecified arm, initial encounter; Unspecified injury of unspecified elbow, initial encounter; Unspecified injury of unspecified wrist, hand and finger(s), initial encounter    TECHNIQUE:  Three or four views of the right shoulder were performed.  PA and lateral views of the right humerus, three views of the right elbow and three views of the right wrist and hand were obtained    COMPARISON:  None    FINDINGS:  Right shoulder: There is anterior inferior dislocation of the right humerus with Hill-Sachs deformity and impaction on the inferior anterior glenoid.  Degenerative AC joint changes are noted with downward spurring on the acromion.  The remainder of the shoulder appears intact.    Right humerus: Bones appear intact distally.  Osteophyte at the insertion of the common extensor tendon on the medial epicondyle is noted.    Right elbow: Osteophyte at the insertion of the triceps tendon on the olecranon as well as on the bicipital tuberosity of the biceps tendon on the radius.  No fracture dislocation or effusion.    Right wrist: Bones, joint spaces and soft tissues are preserved with mild degenerative changes throughout.  No fracture effusion.  Ulnar plus configuration at the radioulnar joint.    Right hand: Osteoarthritic changes throughout the hand are noted.  No fracture or dislocation foreign body.  Soft tissues appear maintained.                                       X-Ray Wrist Complete Right (Final result)  Result time 08/04/24 02:33:42      Final result by Quique Conte MD (08/04/24 02:33:42)                   Impression:      The anterior inferior shoulder dislocation with impaction in Hill-Sachs deformity on the humeral head.  No definite fracture.    Degenerative changes at the elbow, AC joint, wrist and hand.      Electronically signed by: Quique  Inés  Date:    08/04/2024  Time:    02:33               Narrative:    EXAMINATION:  XR SHOULDER TRAUMA 3 VIEW RIGHT; XR HUMERUS 2 VIEW RIGHT; XR ELBOW COMPLETE 3 VIEW RIGHT; XR WRIST COMPLETE 3 VIEWS RIGHT; XR HAND COMPLETE 3 VIEW RIGHT    CLINICAL HISTORY:  hand injury;Unspecified injury of shoulder and upper arm, unspecified arm, initial encounter; Unspecified injury of unspecified elbow, initial encounter; Unspecified injury of unspecified wrist, hand and finger(s), initial encounter    TECHNIQUE:  Three or four views of the right shoulder were performed.  PA and lateral views of the right humerus, three views of the right elbow and three views of the right wrist and hand were obtained    COMPARISON:  None    FINDINGS:  Right shoulder: There is anterior inferior dislocation of the right humerus with Hill-Sachs deformity and impaction on the inferior anterior glenoid.  Degenerative AC joint changes are noted with downward spurring on the acromion.  The remainder of the shoulder appears intact.    Right humerus: Bones appear intact distally.  Osteophyte at the insertion of the common extensor tendon on the medial epicondyle is noted.    Right elbow: Osteophyte at the insertion of the triceps tendon on the olecranon as well as on the bicipital tuberosity of the biceps tendon on the radius.  No fracture dislocation or effusion.    Right wrist: Bones, joint spaces and soft tissues are preserved with mild degenerative changes throughout.  No fracture effusion.  Ulnar plus configuration at the radioulnar joint.    Right hand: Osteoarthritic changes throughout the hand are noted.  No fracture or dislocation foreign body.  Soft tissues appear maintained.                                       X-Ray Elbow Complete Right (Final result)  Result time 08/04/24 02:33:42      Final result by Quique Conte MD (08/04/24 02:33:42)                   Impression:      The anterior inferior shoulder dislocation with  impaction in Hill-Sachs deformity on the humeral head.  No definite fracture.    Degenerative changes at the elbow, AC joint, wrist and hand.      Electronically signed by: Quique Conte  Date:    08/04/2024  Time:    02:33               Narrative:    EXAMINATION:  XR SHOULDER TRAUMA 3 VIEW RIGHT; XR HUMERUS 2 VIEW RIGHT; XR ELBOW COMPLETE 3 VIEW RIGHT; XR WRIST COMPLETE 3 VIEWS RIGHT; XR HAND COMPLETE 3 VIEW RIGHT    CLINICAL HISTORY:  hand injury;Unspecified injury of shoulder and upper arm, unspecified arm, initial encounter; Unspecified injury of unspecified elbow, initial encounter; Unspecified injury of unspecified wrist, hand and finger(s), initial encounter    TECHNIQUE:  Three or four views of the right shoulder were performed.  PA and lateral views of the right humerus, three views of the right elbow and three views of the right wrist and hand were obtained    COMPARISON:  None    FINDINGS:  Right shoulder: There is anterior inferior dislocation of the right humerus with Hill-Sachs deformity and impaction on the inferior anterior glenoid.  Degenerative AC joint changes are noted with downward spurring on the acromion.  The remainder of the shoulder appears intact.    Right humerus: Bones appear intact distally.  Osteophyte at the insertion of the common extensor tendon on the medial epicondyle is noted.    Right elbow: Osteophyte at the insertion of the triceps tendon on the olecranon as well as on the bicipital tuberosity of the biceps tendon on the radius.  No fracture dislocation or effusion.    Right wrist: Bones, joint spaces and soft tissues are preserved with mild degenerative changes throughout.  No fracture effusion.  Ulnar plus configuration at the radioulnar joint.    Right hand: Osteoarthritic changes throughout the hand are noted.  No fracture or dislocation foreign body.  Soft tissues appear maintained.                                       X-Ray Humerus 2 View Right (Final result)  Result  time 08/04/24 02:33:42      Final result by Quique Conte MD (08/04/24 02:33:42)                   Impression:      The anterior inferior shoulder dislocation with impaction in Hill-Sachs deformity on the humeral head.  No definite fracture.    Degenerative changes at the elbow, AC joint, wrist and hand.      Electronically signed by: Quique Conte  Date:    08/04/2024  Time:    02:33               Narrative:    EXAMINATION:  XR SHOULDER TRAUMA 3 VIEW RIGHT; XR HUMERUS 2 VIEW RIGHT; XR ELBOW COMPLETE 3 VIEW RIGHT; XR WRIST COMPLETE 3 VIEWS RIGHT; XR HAND COMPLETE 3 VIEW RIGHT    CLINICAL HISTORY:  hand injury;Unspecified injury of shoulder and upper arm, unspecified arm, initial encounter; Unspecified injury of unspecified elbow, initial encounter; Unspecified injury of unspecified wrist, hand and finger(s), initial encounter    TECHNIQUE:  Three or four views of the right shoulder were performed.  PA and lateral views of the right humerus, three views of the right elbow and three views of the right wrist and hand were obtained    COMPARISON:  None    FINDINGS:  Right shoulder: There is anterior inferior dislocation of the right humerus with Hill-Sachs deformity and impaction on the inferior anterior glenoid.  Degenerative AC joint changes are noted with downward spurring on the acromion.  The remainder of the shoulder appears intact.    Right humerus: Bones appear intact distally.  Osteophyte at the insertion of the common extensor tendon on the medial epicondyle is noted.    Right elbow: Osteophyte at the insertion of the triceps tendon on the olecranon as well as on the bicipital tuberosity of the biceps tendon on the radius.  No fracture dislocation or effusion.    Right wrist: Bones, joint spaces and soft tissues are preserved with mild degenerative changes throughout.  No fracture effusion.  Ulnar plus configuration at the radioulnar joint.    Right hand: Osteoarthritic changes throughout the hand are  noted.  No fracture or dislocation foreign body.  Soft tissues appear maintained.                                       X-Ray Shoulder Trauma Right (Final result)  Result time 08/04/24 02:33:42      Final result by Quique Conte MD (08/04/24 02:33:42)                   Impression:      The anterior inferior shoulder dislocation with impaction in Hill-Sachs deformity on the humeral head.  No definite fracture.    Degenerative changes at the elbow, AC joint, wrist and hand.      Electronically signed by: Quique Conte  Date:    08/04/2024  Time:    02:33               Narrative:    EXAMINATION:  XR SHOULDER TRAUMA 3 VIEW RIGHT; XR HUMERUS 2 VIEW RIGHT; XR ELBOW COMPLETE 3 VIEW RIGHT; XR WRIST COMPLETE 3 VIEWS RIGHT; XR HAND COMPLETE 3 VIEW RIGHT    CLINICAL HISTORY:  hand injury;Unspecified injury of shoulder and upper arm, unspecified arm, initial encounter; Unspecified injury of unspecified elbow, initial encounter; Unspecified injury of unspecified wrist, hand and finger(s), initial encounter    TECHNIQUE:  Three or four views of the right shoulder were performed.  PA and lateral views of the right humerus, three views of the right elbow and three views of the right wrist and hand were obtained    COMPARISON:  None    FINDINGS:  Right shoulder: There is anterior inferior dislocation of the right humerus with Hill-Sachs deformity and impaction on the inferior anterior glenoid.  Degenerative AC joint changes are noted with downward spurring on the acromion.  The remainder of the shoulder appears intact.    Right humerus: Bones appear intact distally.  Osteophyte at the insertion of the common extensor tendon on the medial epicondyle is noted.    Right elbow: Osteophyte at the insertion of the triceps tendon on the olecranon as well as on the bicipital tuberosity of the biceps tendon on the radius.  No fracture dislocation or effusion.    Right wrist: Bones, joint spaces and soft tissues are preserved with  mild degenerative changes throughout.  No fracture effusion.  Ulnar plus configuration at the radioulnar joint.    Right hand: Osteoarthritic changes throughout the hand are noted.  No fracture or dislocation foreign body.  Soft tissues appear maintained.                                      Medications Given:  Medications   ketamine in 0.9 % sod chloride 50 mg/5 mL (10 mg/mL) injection 79 mg (0 mg Intravenous Hold 8/4/24 0330)   morphine injection 6 mg (6 mg Intravenous Given 8/4/24 0005)   propofol (DIPRIVAN) 10 mg/mL IVP (10 mg Intravenous Given 8/4/24 0423)   sodium chloride 0.9% bolus 1,000 mL 1,000 mL (0 mLs Intravenous Stopped 8/4/24 0642)   LORazepam injection 2 mg (2 mg Intravenous Given 8/4/24 0505)   propofol (DIPRIVAN) 10 mg/mL IVP (20 mg Intravenous Given 8/4/24 0434)   ketamine injection (15 mg Intravenous Given 8/4/24 0427)   LORazepam injection 2 mg (2 mg Intravenous Given 8/4/24 0539)   metoprolol injection 5 mg (5 mg Intravenous Given 8/4/24 0708)   folic acid 1 mg in D5W 100 mL IVPB (0 mg Intravenous Stopped 8/4/24 1018)   magnesium sulfate 2g in water 50mL IVPB (premix) (0 g Intravenous Stopped 8/4/24 2150)   potassium chloride SA CR tablet 40 mEq (40 mEq Oral Given 8/4/24 2101)   potassium chloride SA CR tablet 40 mEq (40 mEq Oral Given 8/5/24 0818)   k phos di & mono-sod phos mono 250 mg tablet 2 tablet (2 tablets Oral Given 8/5/24 0819)       Discussed with: IVET    MDM:    68 y.o. male with mechanical fall while intoxicated, with injury to the right shoulder.  Afebrile, hemodynamically stable.  Will obtain x-rays to evaluate for fracture or dislocation.  Will give morphine for pain.     X-ray demonstrating anterior shoulder dislocation.  Please see separate procedure note.  Shoulder was reduced.  Patient was tachycardic, tremulous, encephalopathic, sweaty.  Concern for alcohol withdrawal.  He received benzodiazepines and was admitted to Internal Medicine.    Diagnostic Impression:    1. Fall,  initial encounter    2. Chest pain    3. Shoulder injury    4. Arm injury    5. Elbow injury    6. Wrist injury    7. Chest injury    8. Dislocation of right shoulder joint, initial encounter    9. Alcoholic intoxication with complication    10. Shoulder injury    11. Alcohol withdrawal syndrome with complication    12. Tachycardia    13. Alcohol withdrawal    14. Alcohol use disorder, severe, dependence    15. Acute encephalopathy         ED Disposition Condition    Admit Stable               Patient understands the plan and is in agreement, verbalized understanding, questions answered    Lenora Goode MD  Emergency Medicine         Lenora Goode MD  08/06/24 8005

## 2024-08-04 NOTE — ED NOTES
Per Dr. Millan, pt is good to travel by transport without telemetry monitoring from the ER to the 16th floor.

## 2024-08-04 NOTE — ASSESSMENT & PLAN NOTE
-patient received Ativan 2 mg twice and IV fluids in the ER.    -thiamine 100 mg p.o. and folate 1 mg  -Valium 10 mg p.o. t.i.d.  -Ativan 2 mg IV q.4 PRN when CIWA is more than 8  -CIWA Q 4 H  -continue to monitor for possible withdrawal or DT

## 2024-08-04 NOTE — ASSESSMENT & PLAN NOTE
Patient with atrial fibrillation RVR currently with metoprolol and Eliquis.  - Eliquis is held temporarily and will be resumed if CT head shows no evidence of brain bleed.  -Will Continue Metropolol and Multaq

## 2024-08-05 VITALS
BODY MASS INDEX: 27.32 KG/M2 | WEIGHT: 170 LBS | HEART RATE: 64 BPM | RESPIRATION RATE: 18 BRPM | TEMPERATURE: 99 F | SYSTOLIC BLOOD PRESSURE: 128 MMHG | OXYGEN SATURATION: 96 % | DIASTOLIC BLOOD PRESSURE: 70 MMHG | HEIGHT: 66 IN

## 2024-08-05 LAB
ALBUMIN SERPL BCP-MCNC: 3.4 G/DL (ref 3.5–5.2)
ALP SERPL-CCNC: 50 U/L (ref 55–135)
ALT SERPL W/O P-5'-P-CCNC: 21 U/L (ref 10–44)
ANION GAP SERPL CALC-SCNC: 9 MMOL/L (ref 8–16)
AST SERPL-CCNC: 27 U/L (ref 10–40)
BASOPHILS # BLD AUTO: 0.05 K/UL (ref 0–0.2)
BASOPHILS NFR BLD: 0.7 % (ref 0–1.9)
BILIRUB SERPL-MCNC: 1 MG/DL (ref 0.1–1)
BUN SERPL-MCNC: 9 MG/DL (ref 8–23)
CALCIUM SERPL-MCNC: 8.6 MG/DL (ref 8.7–10.5)
CHLORIDE SERPL-SCNC: 102 MMOL/L (ref 95–110)
CO2 SERPL-SCNC: 22 MMOL/L (ref 23–29)
CREAT SERPL-MCNC: 1 MG/DL (ref 0.5–1.4)
DIFFERENTIAL METHOD BLD: ABNORMAL
EOSINOPHIL # BLD AUTO: 0 K/UL (ref 0–0.5)
EOSINOPHIL NFR BLD: 0.4 % (ref 0–8)
ERYTHROCYTE [DISTWIDTH] IN BLOOD BY AUTOMATED COUNT: 14.5 % (ref 11.5–14.5)
EST. GFR  (NO RACE VARIABLE): >60 ML/MIN/1.73 M^2
GLUCOSE SERPL-MCNC: 116 MG/DL (ref 70–110)
HCT VFR BLD AUTO: 39.2 % (ref 40–54)
HGB BLD-MCNC: 13.2 G/DL (ref 14–18)
IMM GRANULOCYTES # BLD AUTO: 0.04 K/UL (ref 0–0.04)
IMM GRANULOCYTES NFR BLD AUTO: 0.5 % (ref 0–0.5)
LYMPHOCYTES # BLD AUTO: 1.9 K/UL (ref 1–4.8)
LYMPHOCYTES NFR BLD: 24.7 % (ref 18–48)
MAGNESIUM SERPL-MCNC: 2.4 MG/DL (ref 1.6–2.6)
MCH RBC QN AUTO: 33 PG (ref 27–31)
MCHC RBC AUTO-ENTMCNC: 33.7 G/DL (ref 32–36)
MCV RBC AUTO: 98 FL (ref 82–98)
MONOCYTES # BLD AUTO: 0.6 K/UL (ref 0.3–1)
MONOCYTES NFR BLD: 7.8 % (ref 4–15)
NEUTROPHILS # BLD AUTO: 5.1 K/UL (ref 1.8–7.7)
NEUTROPHILS NFR BLD: 65.9 % (ref 38–73)
NRBC BLD-RTO: 0 /100 WBC
PHOSPHATE SERPL-MCNC: 2.2 MG/DL (ref 2.7–4.5)
PLATELET # BLD AUTO: 90 K/UL (ref 150–450)
PMV BLD AUTO: 9 FL (ref 9.2–12.9)
POTASSIUM SERPL-SCNC: 3.5 MMOL/L (ref 3.5–5.1)
PROT SERPL-MCNC: 6.4 G/DL (ref 6–8.4)
RBC # BLD AUTO: 4 M/UL (ref 4.6–6.2)
SODIUM SERPL-SCNC: 133 MMOL/L (ref 136–145)
WBC # BLD AUTO: 7.66 K/UL (ref 3.9–12.7)

## 2024-08-05 PROCEDURE — 25000003 PHARM REV CODE 250: Mod: HCNC | Performed by: STUDENT IN AN ORGANIZED HEALTH CARE EDUCATION/TRAINING PROGRAM

## 2024-08-05 PROCEDURE — 97535 SELF CARE MNGMENT TRAINING: CPT | Mod: HCNC

## 2024-08-05 PROCEDURE — 83735 ASSAY OF MAGNESIUM: CPT | Mod: HCNC

## 2024-08-05 PROCEDURE — 85025 COMPLETE CBC W/AUTO DIFF WBC: CPT | Mod: HCNC

## 2024-08-05 PROCEDURE — 36415 COLL VENOUS BLD VENIPUNCTURE: CPT | Mod: HCNC

## 2024-08-05 PROCEDURE — 25000003 PHARM REV CODE 250: Mod: HCNC

## 2024-08-05 PROCEDURE — 97165 OT EVAL LOW COMPLEX 30 MIN: CPT | Mod: HCNC

## 2024-08-05 PROCEDURE — 80053 COMPREHEN METABOLIC PANEL: CPT | Mod: HCNC

## 2024-08-05 PROCEDURE — 84100 ASSAY OF PHOSPHORUS: CPT | Mod: HCNC

## 2024-08-05 PROCEDURE — 99232 SBSQ HOSP IP/OBS MODERATE 35: CPT | Mod: HCNC,,, | Performed by: PSYCHIATRY & NEUROLOGY

## 2024-08-05 RX ORDER — OXYCODONE HYDROCHLORIDE 5 MG/1
5 TABLET ORAL EVERY 6 HOURS PRN
Status: DISCONTINUED | OUTPATIENT
Start: 2024-08-05 | End: 2024-08-05

## 2024-08-05 RX ORDER — HYDROCODONE BITARTRATE AND ACETAMINOPHEN 5; 325 MG/1; MG/1
1 TABLET ORAL 2 TIMES DAILY PRN
COMMUNITY

## 2024-08-05 RX ORDER — DIAZEPAM 5 MG/1
TABLET ORAL
Qty: 12 TABLET | Refills: 0 | Status: SHIPPED | OUTPATIENT
Start: 2024-08-05 | End: 2024-08-08

## 2024-08-05 RX ORDER — FOLIC ACID 1 MG/1
1 TABLET ORAL DAILY
Status: DISCONTINUED | OUTPATIENT
Start: 2024-08-05 | End: 2024-08-05 | Stop reason: HOSPADM

## 2024-08-05 RX ORDER — ACETAMINOPHEN 325 MG/1
650 TABLET ORAL EVERY 6 HOURS PRN
Status: DISCONTINUED | OUTPATIENT
Start: 2024-08-05 | End: 2024-08-05 | Stop reason: HOSPADM

## 2024-08-05 RX ORDER — POTASSIUM CHLORIDE 20 MEQ/1
40 TABLET, EXTENDED RELEASE ORAL ONCE
Status: COMPLETED | OUTPATIENT
Start: 2024-08-05 | End: 2024-08-05

## 2024-08-05 RX ADMIN — Medication 400 MG: at 08:08

## 2024-08-05 RX ADMIN — SERTRALINE HYDROCHLORIDE 100 MG: 50 TABLET ORAL at 08:08

## 2024-08-05 RX ADMIN — METOPROLOL SUCCINATE 25 MG: 25 TABLET, EXTENDED RELEASE ORAL at 08:08

## 2024-08-05 RX ADMIN — FOLIC ACID 1 MG: 1 TABLET ORAL at 02:08

## 2024-08-05 RX ADMIN — PANTOPRAZOLE SODIUM 40 MG: 40 TABLET, DELAYED RELEASE ORAL at 08:08

## 2024-08-05 RX ADMIN — ALLOPURINOL 200 MG: 100 TABLET ORAL at 08:08

## 2024-08-05 RX ADMIN — APIXABAN 5 MG: 5 TABLET, FILM COATED ORAL at 08:08

## 2024-08-05 RX ADMIN — DIBASIC SODIUM PHOSPHATE, MONOBASIC POTASSIUM PHOSPHATE AND MONOBASIC SODIUM PHOSPHATE 2 TABLET: 852; 155; 130 TABLET ORAL at 08:08

## 2024-08-05 RX ADMIN — DIAZEPAM 10 MG: 5 TABLET ORAL at 02:08

## 2024-08-05 RX ADMIN — POTASSIUM CHLORIDE 40 MEQ: 1500 TABLET, EXTENDED RELEASE ORAL at 08:08

## 2024-08-05 RX ADMIN — DIAZEPAM 10 MG: 5 TABLET ORAL at 08:08

## 2024-08-05 RX ADMIN — ATORVASTATIN CALCIUM 80 MG: 40 TABLET, FILM COATED ORAL at 08:08

## 2024-08-05 RX ADMIN — Medication 100 MG: at 08:08

## 2024-08-05 RX ADMIN — CYANOCOBALAMIN TAB 1000 MCG 1000 MCG: 1000 TAB at 08:08

## 2024-08-05 RX ADMIN — TAMSULOSIN HYDROCHLORIDE 0.4 MG: 0.4 CAPSULE ORAL at 08:08

## 2024-08-05 NOTE — PROGRESS NOTES
[facility]   DEPARTMENT OF PSYCHIATRY     IDENTIFIERS & DEMOGRAPHICS:     ENCOUNTER: subsequent    -- PATIENT IDENTIFIERS: Donald Warren Abadie  [id number]  1955  68 y.o.  male  -- LOCATION OF PATIENT: ICU    -- MODE OF ARRIVAL: unknown/unable to assess  -- PRESENT WITH PATIENT DURING SESSION: ALONE  -- ENCOUNTER PROVIDER: Rula Jessica MD        PRESENTATION:     CHIEF COMPLAINT(S): problematic substance use/abuse and alcohol and/or drug addiction    Per Chart:    On arrival to the ED 8/3, the patient's blood alcohol level was at 84. A urine toxicology screen was not collected. He tripped and fell on his driveway, falling onto the right side of his body. Patient said he had 2 beers in the past 24 hours but normally has 4 beers a day. Patient's son-in-law is by bedside and states that he drinks a case of beer a day. The son-in-law states that the patient had has had past episodes of alcohol withdrawal after having to be sober in the hospital for past hospitalizations.     Per  Review:  - Patient has been prescribed Norco by his outpatient psychiatrist Dr. Larry, last filled a prescription for 28 tablets on 7/23/2024.   - Patient has also been prescribed Gabapentin 300 mg capsule by different providers this year, most recently filled a prescription on 7/23/2024 for 30 days, 60 capsules total.  - Patient was last prescribed Xanax 0.5 mg tablets by Dr. Larry on 02/29/2024, last filled on the same day for 30 tablets total. Patient was prescribed this regimen monthly since December 2022.   - Prior to being prescribed Xanax, patient was prescribed Ativan.     Per Patient:    8/4: Exam complicated by patient being hard of hearing without hearing aids in; frequently required repetition of questions or would respond inappropriately. Endorses tripping in driveway, which he attributes to his Crocs being slippery. States he drinks anywhere between 4-5 versus 10 bud light beers per day. Last drink was prior to  "his fall. Has been drinking since he was a child, states his family were all heavy users of alcohol and have since passed away. Has been to rehab before, did not feel it was helpful to him and views it as being "locked up". Had 5 year period of sobriety after attending rehab. Relapse occurred due to anxiety; endorses generalized anxiety with no specific triggers. 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 Sees a psychiatrist, Dr. Murrell, for his anxiety; endorses taking xanax for his anxiety. Has found AA meeting helpful in the past, but stopped going when he relapsed. Enjoys drinking alcohol with his friends, which he says he does frequently. Endorses intermittent consumption of marijuana.     COLLATERAL  Per Independent Historian(s):    Collateral obtained 8/4: Spoke with patient's son in law at bedside. Reports patient has had extensive hx of hospitalizations for alcohol withdrawal, including hx of delirium tremens. Has coded in the past as a result. Has tried naltrexone, disulfiram in the past but discontinued due to not liking the effects. Is prescribed sertraline but attempts to take prn versus daily. Family installed cameras in patient's house due to concern for well-being. Patient will go for periods without eating when he is drinking excessively. There is concern for is cognitive status, as pt has strong family hx of Alzheimer disease. Family is unsure if his cognitive decline is a result of alcohol use, dementia, or a result of pt being hard of hearing and not using hearing aids. No hx of seizures as a result of withdrawal. Son-in-law very interested in [facility], as pt has done well in the past and achieved prolonged sobriety while undergoing IOP tx.     COLLATERAL  Per Qualified Provider(s)/Professional(s):    Per H&P: Patient  is a 68 y.o. male with history of Afib on Eliquis and Metropolol, alcohol use disorder with " "withdrawal during hospital admissions, type 2 diabetes, RCC who presented to ED with right shoulder pain after trip and fall on his driveway earlier today. Also sustained abrasion over left knee. Per son in law at bedside patient drinks case of beer daily and last drink was earlier today. Workup in ED showed anterior inferior dislocation of R shoulder which was successfully reduced in ED under sedation with morphine, ketamine and propofol and placed on a sling. He developed signs of withdrawal with tremors and tachycardia for which ativan 2 mg IV was given. During my interview patient is drowsy but arousable from sedation, neurovascular intact. Will start on valium 10 mg po tid for withdrawal, ativan 2 mg IV q 4 prn CIWA > 8. Consult addiction psychiatry.    CURRENT EPISODE:  -- EPISODE STATUS: recurrent    -- ASSOCIATED SIGNS/SYMPTOMS: psychiatric, cognitive     Interval Hx  overview & progression:     8/5: On follow-up interview today, patient reports feeling well. He reports adequate sleep. Denies any discomfort, anxiety, tremors, abdominal pain, nausea/vomiting, headaches. CIWA = 0. Patient was initially unaware of the reason for Addiction psychiatry being consulted. When oriented to the situation and concern for alcohol withdrawals, he verbalized understanding. We mentioned his previous completion of the IOP, patient recalled that experience. He spontaneously mentioned being sober for 5 years until he states "I did something, my anxiety got real bad, I wanted to kill myself, so I went to the store and picked up a pack of beers". When inquired about the timing of this recent stressor, patient states "it was 10 years ago". On further questioning of chronology of events, patient appears confused with the timing of events. He is oriented to place, year "2024", not to month or date. He denies any current SI. He appears to be amenable to join the IOP again, and a brochure was provided. He reports he has been " "sleeping well and is doing well overall. Interview was limited due to patient being hard of hearing and concern for cognitive impairments of unknown origin or onset.     REVIEW OF SYSTEMS:    >> SOURCES: patient, collateral, chart     N   Sleep Disturbance/Disruption   N   Appetite/Weight Change   N   Alterations in Energy Level   N   Impaired Focus/Concentration   Y   Depressive Symptomatology  Positive for intermittent sadness, anhedonia, amotivation   Y   Excessive Anxiety/Worry  Positive for more anxciety than the average person, excessive generalized anxiety, panic attacks, a recent stressor   N   Dysregulated Mood/Behavior   N   Manic Symptomatology   N   Psychosis   N   Trauma-Related   N   Impulsivity/Compulsivity/Obsessionality   N   Disordered Eating   N   Dissociation   N   Pain   N   Cardiopulmonary Symptoms   N   Abnormal Involuntary Movements  Dyskinesia: better.     PSYCH  Pertinent Positives/Negatives:    Patient reports feeling well today, mood is "well". Denies any depressed mood, anxiety, or suicidal ideation today.     MEDICAL  Pertinent Positives/Negatives:    Patient reports pain and reduced range of movement in his right shoulder, arm and hand with some improvement this morning.     Regarding the current presentation, no other significant issues or complaints are voiced or known at this time.       ADD-ON PSYCHOTHERAPY:     N/A         HISTORY:     >> SOURCES: patient, collateral, chart review      -- Pertinent Elements of the Past History:     Patient has a history of severe alcohol use disorder with a history of complicated withdrawals and delirium tremens, with a significant family history of alcohol abuse. He mentioned on initial consult occasional cannabis use. He previously completed the IOP program at [facility] in 2019. He is followed by an outpatient psychiatrist Dr. Larry for DOLORES. Collateral from son-in-law is " "concerning for cognitive decline of unknown origin, with a family history of dementia.      -- Psychotropic Trials  detailed/expanded:     Sertraline, Wellbutrin, Xanax, and several SSRIs per collateral from son-in-law. Per  review, patient was prescribed Xanax monthly from 12/2022-02/2024 by his outpatient psychiatrist, and was previously briefly prescribed Ativan.     >> SCHEDULED AND PRN MEDS: reviewed/reconciled  see MEDCARD      Allergies:  No known drug allergies     EXAMINATION:     VITALS:  /74   Pulse 75   Temp 97.8 °F (36.6 °C) (Oral)   Resp 18   Ht 5' 6" (1.676 m)   Wt 77.1 kg (170 lb)   SpO2 (!) 93%   BMI 27.44 kg/m²     MENTAL STATUS EXAMINATION:  Appearance: inadequately groomed, malodorous  in no apparent distress, well-appearing    dressed in a hospital garb, hard of hearing  Behavior & Attitude:   alert and awake, cooperative, calm, appropriate eye contact  Movements & Motor Activity:   range of motion of right upper extremity limited due to injury, spontaneous movement of all other extremities, not tremors noted  Speech & Language: normal rate, normal volume, normal quantity, normal latency, spontaneous, reciprocal, fluent    Mood: "better"  Affect: euthymic, reactive    Thought Process & Associations: linear, organized, coherent    Thought Content & Perceptions: no delusions, no paranoid ideation, no hallucinations, no responding to internal stimuli    Sensorium: awake    Orientation: oriented to place, not oriented to month, not oriented to date, not oriented to day of the week  not oriented to situation, oriented to year    Patient initially denied knowledge of reason for addiction psychiatry consult  Recent & Remote Memory: +short-term impairment (mild), +long-term impairment (mild)  impaired (recent), impaired (remote)    patient reports inconsistent timing of events on history  Attention & Concentration: intact  not easily distracted    Fund of Knowledge: " impaired    Insight: adequate    Judgment: adequate            RISK & REGULATORY:      RISK PARAMETERS (current to the encounter/episode  NOT inclusive of past history):     N   Suicidal Ideation/Threats   N   Suicide Attempts/Gestures   N   Homicidal Ideation/Threats   N   Homicidal Behavior   N   Non-Suicidal Self-Injurious Behavior   N   Perpetrated Violence     FIREARMS & WEAPONS:     Y   Ready Access to Firearms   Y   Prohibition of Firearms Indicated   Y   Gun Safety Counseled  e.g., proper storage, inherent risk   +   Further Considerations  gun is in a safe     SAFETY SCREENINGS:    -- SERIOUS MENTAL ILLNESS (SMI): PRESENT (concern for cognitive decline and mood management of depression/anxiety symptoms)    -- PROTECTIVE FACTORS: IDENTIFIED       - SPECIFIC FACTORS IDENTIFIED: social supports, agrees to monitoring (good support system and good relationship with childre)    -- RISK FACTORS: IDENTIFIED (pain, substance use/addiction, loneliness, aging)     - SPECIFIC MODIFIABLE FACTORS IDENTIFIED: lethal means, psychiatric sx, stressors/triggers, substance abuse     - SPECIFIC NON-MODIFIABLE FACTORS IDENTIFIED: age 58+ years, hx psych tx, male sex    -- CONTRACTS FOR SAFETY: unknown  unable to assess  -- FUTURE ORIENTED: YES  -- REMORSE/REGRET: unknown  unable to assess    -- CAREGIVER(S)     - SUPPORTIVE & APPROPRIATELY INVOLVED: YES    -- RISK MITIGATION & PREVENTION:      - INTERVENTIONS: advice/counseling, tx of pathology     REGULATORY:    -- CARE COORDINATION  the case was discussed and care was coordinated with member(s) of the treatment team.  -- : REVIEWED      INFORMED CONSENT & SHARED DECISION MAKING are the hallmark and bedrock of good clinical care, and as such have been employed and obtained, respectively, to the degree possible.  Discussed, to the extent possible, diagnosis, risks and benefits of proposed treatment (e.g., medication, therapy)  vs alternative treatments vs no treatment, potential side effects of these treatments, and the inherent unpredictability of treatment.  Resources have been provided via the patient instructions in the AVS to supplement, augment, and reinforce discussions, counseling, and/or interventions.       - ABILITY TO UNDERSTAND, PARTICIPATE & ENGAGE: adequate     - AGREEABLE TO TREATMENT (consent/assent): the patient consents to treatment     - RELIABILITY/ACCURACY: the patient is deemed to be an inconsistent historian      WARNINGS & PRECAUTIONS:  >> In cases of emergencies (e.g. SI/HI resulting in danger to self or others, functioning deteriorating to the level of grave disability), call 911 or [phone], or present to the emergency department for immediate assistance.    >> Individuals should not operate a motor vehicle or heavy machinery if effects of medications or underlying symptoms/condition impair the ability to do so safely.    >> FULLY comply with ANY/ALL medication as prescribed/instructed and report ANY/ALL suspected adverse effects to appropriate health care providers.       ASSESSMENT & PLAN:     DIAGNOSES & PROBLEMS:       1.  Alcohol use disorder, severe    2.  Cannabis use, rule out disorder    3.  R/o Major Neurocognitive Disorder    PSYCHOTROPIC REGIMEN:   (C)=Continue as prescribed  (A)=Adjust as noted  (I)=Iniitate  (D)=Discontinue      1.  Diazepam 10 mg PO every 8 hours (C)    2.  Sertraline 100 mg PO daily (C)    -- ASSESSMENT (synthesis  analysis):     68 year old male with hx of alcohol use disorder, AFib on Eliquis, DM, GERD, Gout, BPH, HLD, and anxiety who presented to hospital after a fall. Ethanol level was 84 on arrival to the ED, and a urine toxicology screen was not collected. Psychiatry was consulted for Alcohol use disorder (per son in law he drinks a case of beer/day). Admitted with alcohol withdrawal. Interested in quitting. Patient endorses drinking 4-10 beers per day; has completed rehab in  the past with longest period of sobriety 5 years. Utilizes alcohol to cope with exacerbations of anxiety. Is open to IOP placement, which family is supportive of. There is concern for a cognitive impairment or decline in the patient per family, but the onset and origin is unclear considering life-long alcohol use disorder and significant family history of dementia and substance use disorder.     -- DISPOSITION  SUMMATION:  With reasonable medical certainty, based on history, chart review, available collateral information, and a present-state examination:    - DISPO: high risk for complicated withdrawals.    -- PLAN (goals  recommentations):     With reasonable medical certainty, based on history, chart review, available collateral information, and a present-state examination:  - the patient is deemed to be currently best managed on a medical unit, owing to the need for medical stabilization  - PEC is not indicated  - adjustments to psychotropic regimen as noted herein, otherwise continue as prescribed  - defer non-psychiatric medication(s) and management to the current provider(s) of record  - upon discharge, it is recommended to follow up with an outpatient provider within 1-2 weeks of discharge, but ideally as soon as possible.  - Patient will be referred to [facility] which he previously completed in 2019 and is familiar with.   - contingent on accessibility and willingness, patient would benefit from the following referrals: addiction rehab program and mutual self-help groups (e.g. Alcoholics Anonymous, [employer])  - provide supportive care as warranted: e.g., fluid and electrolyte replacement, vitamin repletion (thiamine, folic acid, multivitamin), adequate caloric support    -- RECOMMENDATIONS FOR DETOX TAPER: continue valium 10mg TID  - advance taper as tolerated; though it is equally as important to be prepared to increase doses of benzodiazepines in the short run if withdrawal symptoms worsen or  fail to adequately improve  - options for medication-assisted treatment (MAT) for alcohol use disorder were discussed  - recommend patient enroll in addiction rehab program after discharge and medical stabilization (IOP).   - counseled on full abstinence from alcohol and substances of abuse (illicit and prescription)  - full engagement in 12 step (or equivalent) recovery program(s), including meeting attendance and acquisition/maintenance of sponsor  - relapse prevention and motivational interviewing provided  - provided resources for various addiction rehabilitation options as part of aftercare planning in [facility]       MEDICAL DECISION MAKING:    - RISK: HIGH      CHART REVIEW: available documentation has been reviewed, and pertinent elements of the chart have been incorporated into this evaluation where appropriate.       DIAGNOSTIC TESTING:      Glu 116 (H)  8/5/2024  Li *   *  TSH 2.089  10/29/2023    HgA1c 5.9 (H)  2/29/2024  VPA *   *   FT4 0.84  10/29/2023    Na 133 (L)  8/5/2024  CLZ *   *  WBC 7.66  8/5/2024    Cr 1.0  8/5/2024  ANC 5.1; 65.9;   8/5/2024   Hgb 13.2 (L)  8/5/2024     BUN 9  8/5/2024  Trop I <0.006  8/4/2024  HCT 39.2 (L)  8/5/2024     GFR >60.0  8/5/2024   CPK 64  6/13/2024  PLT 90 (L)  8/5/2024     Alb 3.4 (L)  8/5/2024   PRL *   *  B12 483  6/19/2023     T Bili 1.0  8/5/2024  Chol 123  11/11/2022  B9 *   *    ALP 50 (L)  8/5/2024  TGs 213 (H)  11/11/2022  B1 54  *    AST 27  8/5/2024  HDL 43  11/11/2022  Vit D *   *     ALT 21  8/5/2024  LDL 37.4 (L)  11/11/2022  HIV Non-reactive  6/21/2023     INR 1.0  10/28/2023  Corina *   *   Hep C Non-reactive  6/21/2023    GGT *   *  Lip 30  4/2/2024  RPR *   *    MCV 98  8/5/2024   NH4 34  8/4/2024  UPT *   *      PETH 1437  11/4/2021  THC Presumptive Positive (A)  6/21/2023    ETOH 84 (H)  8/3/2024  JO-ANN Negative  6/21/2023    EtG *   *  AMP Negative  6/21/2023    ALC 68 (A)  11/29/2022  OPI Negative   6/21/2023    BZO Presumptive Positive (A)  6/21/2023  MTD Negative  6/21/2023     BAR Negative  6/21/2023  BUP Not Detected  12/22/2023    PCP Negative  6/21/2023  FEN Not Detected  12/22/2023     Results for orders placed or performed during the hospital encounter of 08/03/24   EKG 12-lead    Collection Time: 08/04/24  4:46 AM   Result Value Ref Range    QRS Duration 78 ms    [facility] QTC Calculation 465 ms    Narrative    Test Reason : R00.0,    Vent. Rate : 132 BPM     Atrial Rate : 322 BPM     P-R Int : 000 ms          QRS Dur : 078 ms      QT Int : 314 ms       P-R-T Axes : 000 -02 094 degrees     QTc Int : 465 ms    AF/ Atrial flutter with variable A-V block  Nonspecific ST and T wave abnormality  Abnormal ECG  When compared with ECG of 03-AUG-2024 20:12,  ST now depressed in Anterior-lateral leads  T wave inversion no longer evident in Inferior leads  Nonspecific T wave abnormality now evident in Lateral leads  Confirmed by Wood SCHROEDER MD ([id number]) on 8/4/2024 6:56:21 PM    Referred By: [email or web address]   SELF           Confirmed By:Wood SCHROEDER MD        WEIR & LINKS:        Y  = yes/endorses     N  = no/denies     U  = unknown/unable to assess    ADHD   AIMS   AUDIT   AUDIT-C   C-SSRS (Screen)   C-SSRS (Short)   C-SSRS (Full)   DAST   DAST-10   DOLORES-7   MMSE   MoCA   PCL-5   PHQ-9   [place]   YMRS     Consults  [facility] NOM MED/SURG FLEX 16WT       Rula Jessica MD  [facility] PGY-II   Department of Psychiatry  [facility]

## 2024-08-05 NOTE — PLAN OF CARE
Problem: Occupational Therapy  Goal: Occupational Therapy Goal  Description: Goals to be met by: 8/19/2024     Patient will increase functional independence with ADLs by performing:    UE Dressing with Set-up Assistance.  LE Dressing with Set-up Assistance.  Grooming while standing at sink with Modified [place].  Toileting from toilet with Modified [place] for hygiene and clothing management.   Supine to sit with Modified [place].  Step transfer with Modified [place].  Toilet transfer to toilet with Modified [place].    Outcome: Progressing     Pt evaluated and OT goals established.

## 2024-08-05 NOTE — PLAN OF CARE
"  PLAN OF CARE - ADDICTION PSYCHIATRY    Primary reached out regarding the patient's progress today and possibility for discharge today. We discussed the concern for the patient's cognitive issues and ability to attend his ADLs and follow the Valium taper appropriately on his own. Should the patient have a safe disposition, we discussed the following taper schedule: recommend to continue Valium 10 mg PO TID today, followed by 10 mg PO BID for one day, then 5 mg PO BID for one day until discontinued. Primary team reached out to the patient's daughter who is a nurse. She requested to speak with an Addiction Psychiatry team member regarding follow-up options.     Collateral obtained from Ms. Wilde (patient's daughter) at [phone]   Ms. Wilde inquired about the patient's follow-up plan after discharge. I informed her that we had been discussing the [facility] ([facility]) which the patient has previously completed in 2019. She mentioned that last year she was looking to enroll him into this same program but there were no availabilities for him, so he instead went to another outpatient program in the city. During and after leaving that program, daughter states that the patient was prescribed Xanax which he became dependent on, has been abusing pain relievers, notably Norco, for what was believed to be testicular pain, further worked up and diagnosed as chronic back pain. She reports the patient has been going to different doctors to be prescribes these controlled substances. In addition to that, the patient has been smoking cannabis and drinking beer daily. She has been concerned for his wellbeing but the patient has become increasingly defensive with her. She stated "he says it's his anxiety that makes everything bad so he uses all these substances. He does not socialized. His house is filthy with weed, dog feces everywhere". She believes the patient's alcohol use became excessive when he retired, daughter " who is his only child got , and his mother passed away. She believes the patient does not have a purpose in life anymore but that maybe enrolling in a program will give him a new purpose. Ms. Wilde informed me that she was preparing for the patient to return home and plans to remove all controlled substances and cannabis from his house. She hopes that due to his injury he will accept to come home with her for the next few days and her  will help convince the patient. She does not trust that the patient will take the Valium taper as prescribed due to his addictive tendencies and cognitive issues. She wonders if he suffers from Alzheimer's due to a strong family history, and mentions he failed his cognitive test for his medicare visit. We discussed the ORP schedule and regular monitoring services available. I informed Ms. Wilde that patient was referred to the ORP, provided her with the phone number to make further arrangements, and resources available in the patient's AVS. Patient was also provided with the brochure to the ORP. She will be coming to pick him up should he be discharged today. She denied any other concerns.         Rula Jessica MD  [facility] PGY-II   Department of Psychiatry  [facility]

## 2024-08-05 NOTE — PLAN OF CARE
SW placed OPCM referral via [facility].    DEENA will continue to follow.                JOSSY Terrell, LMSW  [facility]  Case Management  Ext. [phone]

## 2024-08-05 NOTE — PLAN OF CARE
[facility] ([facility]  Initial Discharge Assessment       Primary Care Provider: Bacilio Larry MD    Admission Diagnosis: Alcohol withdrawal [F10.939]  Wrist injury [S69.90XA]  Elbow injury [S59.909A]  Shoulder injury [S49.90XA]  Tachycardia [R00.0]  Arm injury [S49.90XA]  Chest injury [S29.9XXA]  Chest pain [R07.9]  Fall, initial encounter [W19.XXXA]  Dislocation of right shoulder joint, initial encounter [S43.004A]  Alcohol withdrawal syndrome with complication [F10.939]  Alcoholic intoxication with complication [F10.929]    Admission Date: 8/3/2024  Expected Discharge Date: 8/5/2024    Transition of Care Barriers: Substance Abuse    Payor: HUMA[employer] MANAGED MEDICARE / Plan: HUMANA MEDICARE HMO / Product Type: Capitation /     Extended Emergency Contact Information  Primary Emergency Contact: Chani Gleason   [facility]  Home Phone: [phone]  Relation: Daughter    Discharge Plan A: Home with family  Discharge Plan B: Home      [id number] - [place] - [address]  [address] [facility] [address]  [address] [place] [address]  Phone: [phone] Fax: [phone]      Initial Assessment (most recent)       Adult Discharge Assessment - 08/05/24 1517          Discharge Assessment    Assessment Type Discharge Planning Assessment     Confirmed/corrected address, phone number and insurance Yes     Confirmed Demographics Correct on Facesheet     Source of Information patient     Communicated DALY with patient/caregiver Yes     People in Home alone     Do you expect to return to your current living situation? Other (see comments)   Patient plans to dc to daughter's house for additional support.    Do you have help at home or someone to help you manage your care at home? Yes     Who are your caregiver(s) and their phone number(s)? Chani Gleason (Daughter)  [phone]     Prior to hospitilization cognitive status: Alert/Oriented     Current cognitive status:  Alert/Oriented     Walking or Climbing Stairs Difficulty no     Dressing/Bathing Difficulty no     Home Accessibility stairs to enter home     Number of Stairs, Main Entrance one     Home Layout Able to live on 1st floor     Equipment Currently Used at Home none     Readmission within 30 days? No     Patient currently being followed by outpatient case management? No     Do you currently have service(s) that help you manage your care at home? No     Do you take prescription medications? Yes     Do you have prescription coverage? Yes     Coverage HUMANA MANAGED MEDICARE - HUMANA MEDICARE HMO - CAPITATED     Do you have any problems affording any of your prescribed medications? No     Is the patient taking medications as prescribed? yes     Who is going to help you get home at discharge? Chani Gleason (Daughter)  [phone]     How do you get to doctors appointments? car, drives self;family or friend will provide     Are you on dialysis? No     Do you take coumadin? No     Discharge Plan A Home with family     Discharge Plan B Home     DME Needed Upon Discharge  none     Discharge Plan discussed with: Patient     Transition of Care Barriers Substance Abuse        Physical Activity    On average, how many days per week do you engage in moderate to strenuous exercise (like a brisk walk)? 0 days (P)      On average, how many minutes do you engage in exercise at this level? 0 min (P)         Financial Resource Strain    How hard is it for you to pay for the very basics like food, housing, medical care, and heating? Not very hard (P)         Housing Stability    In the last 12 months, was there a time when you were not able to pay the mortgage or rent on time? No (P)      At any time in the past 12 months, were you homeless or living in a shelter (including now)? No (P)         Transportation Needs    Has the lack of transportation kept you from medical appointments, meetings, work or from getting things needed for daily  living? No (P)         Food Insecurity    Within the past 12 months, you worried that your food would run out before you got the money to buy more. Never true (P)      Within the past 12 months, the food you bought just didn't last and you didn't have money to get more. Never true (P)         Alcohol Use    Q1: How often do you have a drink containing alcohol? 4 or more times a week (P)      Q2: How many drinks containing alcohol do you have on a typical day when you are drinking? 5 or 6 (P)      Q3: How often do you have six or more drinks on one occasion? Weekly (P)         Utilities    In the past 12 months has the electric, gas, oil, or water company threatened to shut off services in your home? No (P)         Health Literacy    How often do you need to have someone help you when you read instructions, pamphlets, or other written material from your doctor or pharmacy? Never (P)                  Discharge Plan A and Plan B have been determined by review of patient's clinical status, future medical and therapeutic needs, and coverage/benefits for post-acute care in coordination with multidisciplinary team members.                       JOSSY Terrell, LMSW  [facility]  Case Management  Ext. [phone]

## 2024-08-05 NOTE — DISCHARGE SUMMARY
[facility] ([address] Medicine  Discharge Summary      Patient Name: Donald Warren Abadie  MRN: [id number]  CARLA: [id number]  Patient Class: IP- Inpatient  Admission Date: 8/3/2024  Hospital Length of Stay: 1 days  Discharge Date and Time:  08/05/2024 2:54 PM  Attending Physician: Cuauhtemoc Millan, *   Discharging Provider: Jose Ramon Francois MD  Primary Care Provider: Bacilio Larry MD  [facility] Medicine Team: [facility] Jose Ramon Francois MD  Primary Care Team: [facility] MED 5    HPI:   Donald Warren Abadie is a 68 y.o. male with history of Afib on Eliquis and Metropolol, alcohol use disorder, type 2 diabetes, RCC presenting to emergency department with complaint of mechanical fall, and right shoulder injury.       Most of the history is gathered through the son-in-law. Patient is drowsy at bed and a poor historian. He tripped and fell on his driveway, falling onto the right side of his body. Patient said he had 2 beers in the past 24 hours but normally has 4 beers a day.  Patient's son-in-law is by bedside and states that he drinks a case of beer a day.  The son-in-law states that the patient had has had past episodes of alcohol withdrawal after having to be sober in the hospital for past hospitalizations. Patient's states that he has had nonbloody vomit twice in the past 24 hours.      Patient and denies any history of delirium tremens or withdrawal seizures. Patient denies any chest pain, dyspnea, dizziness, lightheadedness, nausea, anxiety, abdominal pain, itchiness, bug crawling sensation,     In the ER, he was given IV morphine 6 mg for pain and Ativan 2 mg twice with IV fluids. He was also administered ketamine and propofol for reduction of dislocated right shoulder. CWA is 13 at bedside.    * No surgery found *      Hospital Course:   Admitted to  s/p fall in the setting of alcohol intoxication. Patient found to have anterior dislocation of right shoulder s/p reduction in ED,  uncontrolled Afib, and alcohol withdrawal. Patient restarted on antiarrythmic medicine with spontaneous conversion to NSR. He was also evaluated by psychiatry who recommends valium taper and patient agreeable to outpatient rehab program. On hospital day 2, patient with improvement in pain, sensation, movement of right arm.   There is some concern of xanax abuse at home, discussed with daughter who will remove all medications from home prior to discharge. Bedside nurse aware to give valium taper to family directly. He will go home with Valium 10mg TID x 1 day, 10mg BID x 1 day, and 5mg BID x 1 day. Discussed shoulder injury with ortho who recommends outpatient follow up with sports clinic, referral placed.     Goals of Care Treatment Preferences:  Code Status: Full Code         Consults:   Consults (From admission, onward)          Status Ordering Provider     Inpatient consult to Psychiatry  Once        Provider:  (Not yet assigned)    JOSSELIN Anderson            No new Assessment & Plan notes have been filed under this hospital service since the last note was generated.  Service: Hospital Medicine    Final Active Diagnoses:    Diagnosis Date Noted POA    PRINCIPAL PROBLEM:  Alcohol withdrawal syndrome with complication [F10.939] 05/22/2014 Yes    Traumatic closed displaced fracture of right shoulder with anterior dislocation [S42.91XA] 08/04/2024 Yes    Acute encephalopathy [G93.40] 08/04/2024 Yes    Benign prostatic hyperplasia with nocturia [N40.1, R35.1] 03/13/2024 Yes     Chronic    Generalized anxiety disorder [F41.1] 05/13/2022 Yes     Chronic    Type 2 diabetes mellitus without complication, without long-term current use of insulin [E11.9] 10/07/2019 Yes     Chronic    Alcohol use disorder, severe, dependence [F10.20]  Yes    GERD (gastroesophageal reflux disease) [K21.9] 04/02/2019 Yes     Chronic    Hyponatremia [E87.1] 03/15/2019 Yes    Metabolic dysfunction-associated steatotic liver disease (MASLD)  [K76.0] 07/03/2014 Yes     Chronic    Atrial fibrillation with RVR [I48.91] 05/22/2014 Yes    Hyperlipidemia associated with type 2 diabetes mellitus [E11.69, E78.5]  Yes     Chronic      Problems Resolved During this Admission:       Discharged Condition: stable    Disposition: Home or Self Care    Follow Up:   Follow-up Information       Schedule an appointment as soon as possible for a visit  with Bacilio Larry MD.    Specialty: Internal Medicine  Contact information:  [address]  [facility] [id number] [phone]               Schedule an appointment as soon as possible for a visit  with [facility] 5th Fl.    Specialty: Orthopedics  Contact information:  [address]  [facility] [address] [phone]  Additional information:  [facility] - Main Building, 5th Floor   Please park in [facility] and take Atrium elevator                         Patient Instructions:      Ambulatory referral/consult to Sports Medicine   Standing Status: Future   Referral Priority: Routine Referral Type: Consultation   Referral Reason: Specialty Services Required   Requested Specialty: Sports Medicine   Number of Visits Requested: 1     Ambulatory referral/consult to Addiction Psychiatry   Standing Status: Future   Referral Priority: Routine Referral Type: Psychiatric   Referral Reason: Specialty Services Required   Requested Specialty: Addiction Medicine   Number of Visits Requested: 1       Significant Diagnostic Studies: N/A    Pending Diagnostic Studies:       None           Medications:  Reconciled Home Medications:      Medication List        START taking these medications      diazePAM 5 MG tablet  Commonly known as: VALIUM  Take 2 tablets (10 mg total) by mouth every 8 (eight) hours for 1 day, THEN 2 tablets (10 mg total) every 12 (twelve) hours for 1 day, THEN 1 tablet (5 mg total) every 12 (twelve) hours for 1 day.  Start taking on: August 5, 2024             CHANGE how you take these medications      dronedarone 400 mg Tab  Commonly known as: MULTAQ  Take 1 tablet (400 mg total) by mouth 2 (two) times daily with meals.  Start taking on: August 6, 2024  What changed: when to take this     ELIQUIS 5 mg Tab  Generic drug: apixaban  TAKE 1 TABLET BY MOUTH TWICE DAILY  What changed: how much to take     tamsulosin 0.4 mg Cap  Commonly known as: FLOMAX  TAKE 1 CAPSULE(0.4 MG) BY MOUTH EVERY DAY  What changed: when to take this            CONTINUE taking these medications      allopurinoL 100 MG tablet  Commonly known as: ZYLOPRIM  TAKE 2 TABLETS(200 MG) BY MOUTH EVERY DAY     cyanocobalamin 1000 MCG tablet  Commonly known as: VITAMIN B-12  Take 1 tablet (1,000 mcg total) by mouth once daily.     diclofenac sodium 1 % Gel  Commonly known as: VOLTAREN  Apply 2 g topically 3 (three) times daily as needed (Right knee - hand pain).     folic acid 1 MG tablet  Commonly known as: FOLVITE  Take 1 tablet (1 mg total) by mouth once daily.     gabapentin 300 MG capsule  Commonly known as: NEURONTIN  Take 1 capsule (300 mg total) by mouth 2 (two) times daily.     ketoconazole 2 % cream  Commonly known as: NIZORAL  Apply topically once daily. Apply to affected skin from toes to heels twice a day for 3-4 weeks.     magnesium oxide 400 mg (241.3 mg magnesium) tablet  Commonly known as: MAG-OX  Take 1 tablet (400 mg total) by mouth once daily.     metFORMIN 500 MG ER 24hr tablet  Commonly known as: GLUCOPHAGE-XR  Take 2 tablets (1,000 mg total) by mouth 2 (two) times daily with meals.     metoprolol succinate 25 MG 24 hr tablet  Commonly known as: TOPROL-XL  Take 1 tablet (25 mg total) by mouth once daily.     multivitamin tablet  Commonly known as: THERAGRAN  Take 1 tablet by mouth once daily.     omega-3 acid ethyl esters 1 gram capsule  Commonly known as: LOVAZA  Take 2 g by mouth 2 (two) times daily.     pantoprazole 40 MG tablet  Commonly known as: PROTONIX  Take 1 tablet (40 mg  total) by mouth once daily.     rOPINIRole 1 MG tablet  Commonly known as: REQUIP  TAKE 1 TABLET(1 MG) BY MOUTH EVERY EVENING     rosuvastatin 20 MG tablet  Commonly known as: CRESTOR  TAKE 1 TABLET(20 MG) BY MOUTH EVERY DAY     sertraline 100 MG tablet  Commonly known as: ZOLOFT  Take 1 tablet (100 mg total) by mouth once daily.     VITAMIN B-1 100 MG tablet  Generic drug: thiamine  TAKE 1 TABLET BY MOUTH ONCE DAILY              Indwelling Lines/Drains at time of discharge:   Lines/Drains/Airways       Drain  Duration             Male External Urinary Catheter 08/04/24 1 day                    Time spent on the discharge of patient: 31 minutes         Jose Ramon Francois MD  Department of Hospital Medicine  [facility] ([place])

## 2024-08-05 NOTE — HOSPITAL COURSE
Admitted to  s/p fall in the setting of alcohol intoxication. Patient found to have anterior dislocation of right shoulder s/p reduction in ED, uncontrolled Afib, and alcohol withdrawal. Patient restarted on antiarrythmic medicine with spontaneous conversion to NSR. He was also evaluated by psychiatry who recommends valium taper and patient agreeable to outpatient rehab program. On hospital day 2, patient with improvement in pain, sensation, movement of right arm.   There is some concern of xanax abuse at home, discussed with daughter who will remove all medications from home prior to discharge. Bedside nurse aware to give valium taper to family directly. He will go home with Valium 10mg TID x 1 day, 10mg BID x 1 day, and 5mg BID x 1 day. Discussed shoulder injury with ortho who recommends outpatient follow up with sports clinic, referral placed.

## 2024-08-05 NOTE — PATIENT INSTRUCTIONS
REFERRAL RECOMMENDATIONS FOR ALCOHOL USE DISORDER      12 STEP PROGRAMS (and similar):     Alcoholics Anonymous (local)  [x] [phone]  [x] www.[place].org for schedules for in-person and online meetings  [x] There are AA meetings throughout the day all over town  [x] AA costs nothing to attend; they pass a basket for donations but this is not required    Alcoholics Anonymous Online Intergroup (national)  [x] www.aa-intergroup.org  [x] Good resource for large, nation-wide meetings  [x] Can also attend smaller, local meetings in other cities  [x] Countless meetings all day and all night  [x] AA costs nothing to attend; they pass a basket for donations but this is not required    Flying Sober - 24/7 zoom meetings for women and coed - sign on anytime, anywhere!  https://Viewhigh TechnologysobSoci Ads/71-0-poutygwl/    Online Intergroup of AA - 121 Open AA [place] Meeting - 24/7 zoom meetings  https://aa-intergroup.org/meetings/    LOOKING FOR AN ALTERNATIVE TO 12 STEP PROGRAMS - check out:  SMART Recovery: https://www.smartrecovery.org/about-us  [facility] Recovery: https://recoverydharma.org      DETOX UNITS (USUALLY 5-7 DAYS):     [facility]: [address], [place]  [phone], call first to ensure bed availability    [facility]: [address] [place]  [phone], Option 1, call first to ensure bed availability    [place] [facility] Recovery Center: [facility] [address]um[address], [place]  [phone] (intake by appointment only)    [facility]: [address], [place]  [phone]      INTENSIVE OUTPATIENT PROGRAMS:     [facility] REC[facility] (formerly known as the ABU)  [x] [phone], Option 2  [x] [address] 4th Floor, [place] [address]  [x] https://www.ochsner.org/services/[facility]-recovery-program  [x] [facility] delivers comprehensive and collaborative treatment for alcohol and substance use disorders.  Excellent program for working professionals or  anyone else seeking recovery.  [x] Requires insurance approval prior to starting program, call number above for more information.  [x] Intensive Outpatient Rehabilitation Program - M-F 9am-3pm - daily groups with psychologists and social workers, sessions with MDs 3x per week   [x] Ambulatory detox and dual diagnosis available    [facility] Intensive Outpatient Program  [x] [phone]  [x] [address] [facility] (the clinic not on [facility])  [x] Call number above for more info and to check insurance requirements    [facility]  [address] [place], [place] [address] [phone]    [facility]:  [address] [facility] [place], [place] [address]?, [phone]  [address] [facility]?, [place], [place] [address]?, [phone]    RESIDENTIAL REHABS (USUALLY 28 DAYS):     [facility]: [address], [phone]    [facility]: [address] , [facility]  [phone] (intake by appointment only)    [facility] (men only) [address] [facility], [phone]    [facility] (Female only) [address] [facility], [phone]    [facility]: [address], [facility], men's program [phone], women's program [phone]    [facility]: [address], [facility], [phone]    [facility]: [address] [place], [place], [phone]    [facility]: Men only, [phone], [address] [place] [facility]: [address], [place], [place], [phone]    [facility]: [facility] [place], [place],  [phone]  New Location: [address] [place] [address] [address], [place] [address], [phone]    [facility]:   ?[address]?[place], [place] [address]?[phone]    Toya: [address], [place] [address], [phone]    [place]: [place], [phone]     [facility]: [place], [place], [phone]    [facility]: [place]  JEANIE Jiménez, [phone]    [facility]: [place], [place], [phone]    [facility]: [place], [place], [phone]    Cindy [place]: [address], [place] [address], [phone]    COMMUNITY ADDICTION CLINICS:     [facility]: [address] [facility] [place], [phone] -or- [address] [place] [address]    [facility] [place]: [address] [facility], [place]  [address]     [facility]: Clinics [phone]; Crisis [phone]    [facility]: [address] [place] [address]    [facility]: [address] [place] [place] [address]    [facility]: [address] [place] [address],    [facility]: 2nd Floor [address] [place] [address]    [facility]: [address] [facility] [address]    [facility], [address] [place] [address]    [facility]: [address] [place] [address], [place] [phone]  (serves youth 16-23 years old)    [facility]: [facility]/[place] [phone]    [facility]: [address] [facility], [place] [phone]    [facility]: [address], [place] [phone]    [facility]: [address] [facility], [address], [phone]     [facility]: [address] [facility], [phone], [phone]    RESOURCES IN OTHER [facility]:     [facility]: [address] [place], [phone], [phone]    [facility]: [address], [address], [phone]    [facility], [address] [place] [address], [phone]    [facility]: [address], [place], [phone]    [facility], [address] [facility], [phone] ([facility])    [facility], [address] [facility], [phone] ([facility])    [facility], [address], [place], [phone] ([place], [place], and [facility])    [facility], [address], [place], [phone] ([facility])    [facility] Hotline [phone], [phone]    [facility], [address] [facility], [facility], [address] [facility], [address], [facility], [address] [facility], [facility], [address] [facility] [address]., [facility], [address], [facility], [address], [address] [phone]    [facility], [address] [facility] [phone]    [facility], [address] [facility], [phone]    [facility]. for Addiction Recovery, [address] [place] [address], [phone]      Tu[place]uan SPEAKING (en español):     Información de la reunión de Alcohólicos Anónimos  Toro [facility], 10:00 am  Habla español  Esta reunión está abierta y cualquiera puede asistir.    English speaking Alcoholics anonymous meetings:  El "Toro [place] AA Skype" es un toro on line de Alcohólicos Anónimos en español. El toro es lalita, gratuito y virtual a través de Skype Audio. El toro funciona mediante jaspal llamada grupal de voz, por lo que no se utiliza la videollamada, ni se pueden sanju las imágenes o rostros de los participantes. Hace chang años y medio abrimos el primer Toro de AA por Skprincesse en Jb, comfort actualmente asisten personas desde [place], [place], [place], [place], [place], [place], [place], [place], [place], [place], " [place] y [place], entre otros.    El giovanni es muy útil para los alcohólicos que residen en lugares donde no se celebran reuniones de AA, o residen en lugares donde las reuniones de AA son un número limitado de días a la semana, o para aquellos compañeros que se hayan de viaje o que, por cualquier motivo, se hayan convalecientes y no pueden desplazarse. Todos los días nos reuniones a las 21:00 (hora española)    Podéis obtener más información sobre el giovanni y lupe sesiones en la págXcalar web [email or web address] PLM.Mathsoft Engineering & Education.tl/      MENTAL HEALTH/ADDICTIVE DISORDERS:     AA ([phone]), NA ([phone])   National Suicide Prevention Lifeline- Call 1-467.370.9293 Available 24 hours [facility] [phone]; Crisis Line [phone] - Call for options A-F:  Intensive Outpatient Treatment/ Day programs   [facility], please contact   [facility], please contact [phone] or [phone] to speak with an admissions counselor.  Behavioral Health Group (Methadone Maintenance)   [address] [place] [address], [phone]  [facility] [place] [address] [phone]  [facility], [address] [place] [address], [phone]  [facility] [phone]  [facility] [place] please contact [phone]  [facility], [place] [facility], 4th floor JEANIE Mendez [address] Phone: [phone]   [facility]  [place] Office: [address] [address], [phone]  [place] Office: [address] [facility] [address], [place] [address], [phone]  [place] Office: [address] [facility] [place] [address] [phone]    Outpatient Substance Abuse Treatment   Behavioral Health Group (Methadone Maintenance)   [facility] [place] [address], [phone]  [address] [place] [address] [phone]  [facility], [address] [place] [address], [phone]  Acer  [place] Office: [address] [place] [address], [phone]  [place] Office: [address] [facility], [address], [place] [address], [phone]  [place] Office: [address] [place] [address] [phone]  [facility], [address] [place] [address] [phone]   [facility], [address] [facility], [address], [phone]  [facility], [address] [place], [place] [phone]    Residential Substance Abuse Treatment   [facility] [address] [facility], [phone]  [facility], [address] [facility], [phone]  [facility] (men only) [address] [place] [address], [phone]  [facility] at [facility] (women only) [address] [place] [address] [phone]  [facility], [address] [place] [address] [phone]  [facility] (women only), intakes at [address] [facility], [phone]  [facility] (7-day program, $100, [address], [phone], [phone], [phone])  [facility] (Men only, [phone]), [address], [place] (Vets*/Non-Vets)  [facility] (Men only, $400/month program fee) [address] [facility] Vicente Almazan, [phone]  [facility] (Women over age 39 only), [address] [facility], [phone]    Out of Area:    [place], [address] [place] [address] 36, [place], [place] [phone])  [facility] (men only), [address] [facility] [place] [address], [phone]  [facility], [address] [place], [place] ([phone])  [address] [place], [place] ([phone])  [facility], [address] [place] [address], [phone]  [facility] (Women only, has to have mental illness, can be homeless or substance abuser), [phone]      [place] RESOURCES:     [place] Mobile Mental Health Crisis Response  Team:    Region 12 ([place], [place], [place], and [facility]) ([facility])  [phone]      Outpatient Mental Health & Addiction Clinic Resources for both [facility]:    [facility] Resources  Website: www.[facility].org  Main Number: 323-841-0014    [facility] ([facility])  [address] ([address] [facility]) [place] [address]  [phone]    [facility] ([facility])  [address] ([address] [facility]) [place] [address]  [phone]    [facility]  [address] ([address]) [place] [address]  [phone]    [facility]  [address] ([address] [facility]) [place] [address]  [phone]      Addiction Treatment Resources for both [facility]:    [facility] (Detox, Residential, PHP, IOP, and Aftercare Programs)  [address] [place], [place] [address] [phone]    [facility] (Residential, IOP, Transitional Living, and Aftercare Programs)  [address] [place] [address]  [phone]    [facility] (Inpatient, Residential, Detox, IOP, Outpatient, and Aftercare Programs)  [address] [place] [address] [phone] or toll free at [phone]      Outpatient Mental Health Psychotherapy Resources for both [facility]:    Lamar Hammond, RADHAW  [address]  [place] [address]  [phone]  Specialties: Depression, Anxiety, and Life Transitions    Laine Ramires, PhD  [address]  [place] [address]  [phone]  Specialties: Testing and Evaluation, Education and Learning Disabilities, and ADHD    Mariely Castle, RADHAW [facility] [address] [place] [address]  [phone]  Specialties:  Obsessive-Compulsive (OCD), Depression, and Relationship Issues    Rose Marie Catherine LPC [address] [place] [address]  [phone]  Specialties: Trauma & PTSD, Mood Disorders, and Anxiety    Rose Marie Ribera, PhD, [facility] [address] [place] [address], [place] [id number] [phone]  Specialties: Family Conflict, Child, and Relationship Issues    Humaira Dewitt LPC [facility] [place], [place] [address] [phone]  Specialties: Anxiety, Depression, and Anger Management        IN CASE OF SUICIDAL THINKING, call the National Suicide Hotline Number: 988    988 Suicide & Crisis Lifeline: 988 , 2-0574-035-211-TALK (4684)  Provides 24/7, free and confidential support for people in distress, prevention and crisis resources for you or your loved ones, and best practices for professionals.    Call, text or chat.  https://988Data Impactline.org

## 2024-08-05 NOTE — PT/OT/SLP EVAL
"Occupational Therapy   Evaluation and Treatment    Name: Donald Warren Abadie  MRN: [id number]  Admitting Diagnosis: Alcohol withdrawal syndrome with complication  Recent Surgery: * No surgery found *      Recommendations:     Discharge Recommendations: Low Intensity Therapy  Discharge Equipment Recommendations:  bath bench  Barriers to discharge:  None    Assessment:     Donald Warren Abadie is a 68 y.o. male with a medical diagnosis of Alcohol withdrawal syndrome with complication.  Pt tolerated session well and without incident, but he requires (A) with ADLs and mobility and is performing below his functional baseline.  Due to his current level of function compared to his PLOF and his home environment, low intensity therapy recommended at d/c to ensure his safety.  He presents with the following.  Performance deficits affecting function: impaired self care skills, decreased upper extremity function, pain, orthopedic precautions, impaired fine motor, decreased ROM.      Rehab Prognosis: Good; patient would benefit from acute skilled OT services to address these deficits and reach maximum level of function.       Plan:     Patient to be seen 3 x/week to address the above listed problems via self-care/home management, therapeutic exercises, therapeutic activities  Plan of Care Expires: 09/04/24  Plan of Care Reviewed with: patient    Subjective     Chief Complaint: R shoulder pain and decreased level of function with his dominant arm  Patient/Family Comments/goals: "I can move my hand and fingers better than yesterday, and my sensation is better."    Occupational Profile:  Living Environment: Pt lives alone with his 2 dogs in a H with a threshold to enter.  He has a tub/shower combo, but he wants to eventually have a walk-in tub with a built-in bench.  Pt reports he fell and hit his head a couple of months ago.    Previous level of function: Independent with ADLs and mobility.  Pt drives.  Roles and Routines: father; " "cuts the grass; retired from [employer]  Equipment Used at Home: none  Assistance upon Discharge: unknown.  [place], and he visits her a couple of days per week.     Pain/Comfort:  Pain Rating 1: other (see comments) (not rated but said "better than yesterday")  Location - Side 1: Right  Location - Orientation 1: generalized  Location 1: shoulder  Pain Addressed 1: Distraction, Reposition, Pre-medicate for activity  Pain Rating Post-Intervention 1: other (see comments) (not rated)    Patients cultural, spiritual, Yarsani conflicts given the current situation: no    Objective:     Communicated with: nurse prior to session.  Patient found HOB elevated with PureWick, telemetry, peripheral IV upon OT entry to room.    General Precautions: Standard, fall, hearing impaired  Orthopedic Precautions: N/A  Braces: UE Sling (for comfort only, per secure chat with Dr. Francois on 8/5/24 (after evaluation))  Respiratory Status: Room air    Occupational Performance:    Bed Mobility:    Patient completed Rolling/Turning to Left with  supervision  Patient completed Scooting/Bridging with supervision  Patient completed Supine to Sit with supervision    Functional Mobility/Transfers:  Patient completed Sit <> Stand Transfer from EOB x 1 trial with stand by assistance with no assistive device   Patient completed Bed <> Chair Transfer using Step Transfer technique with stand by assistance with no assistive device  Functional Mobility: Pt ambulated ~10 ft x 2 trials with SBA with no AD.  He had no LOB or reports of dizziness.     Activities of Daily Living:  Grooming: stand by assistance to perform oral care while standing at the bathroom sink  Upper Body Dressing: moderate assistance to don back gown as a robe/manage lines while seated EOB. Total Assistance to don sling while seated EOB.  Lower Body Dressing: total assistance to don non-skid socks while seated EOB  Toileting: Pt was connected to a PureWick and " was wearing an adult brief upon entrance, but he had no toileting needs.     Cognitive/Visual Perceptual:  Cognitive/Psychosocial Skills:     -       Oriented to: Person, Place, Time, and Situation   -       Follows Commands/attention:Follows multistep  commands  -       Communication: clear/fluent    Physical Exam:  Dominant hand:    -       R-handed  Upper Extremity Range of Motion:     -       Right Upper Extremity: shoulder ROM not tested due to unspecified precautions and pt's safety.  He was able to actively move his wrist and digits.    -       Left Upper Extremity: WFL  Upper Extremity Strength:    -       Right Upper Extremity: not tested due to unspecified precautions and pt's safety.  -       Left Upper Extremity: WFL   Strength:    -       Right Upper Extremity:  minimal  grasp  -       Left Upper Extremity: WFL  Fine Motor Coordination:    -       Intact LUE  -       Impaired  Right hand thumb/finger opposition skills (able to get to 2nd digit)    AMPAC 6 Click ADL:  AMPAC Total Score: 18    Treatment & Education:  Pt edu on role of OT, POC, safety when performing self care tasks, benefit of performing OOB activity, and safety when performing functional transfers and mobility.    - Self care tasks completed-- as noted above      Patient left up in chair with all lines intact, call button in reach, and nurse notified    GOALS:   Multidisciplinary Problems       Occupational Therapy Goals          Problem: Occupational Therapy    Goal Priority Disciplines Outcome Interventions   Occupational Therapy Goal     OT, PT/OT Progressing    Description: Goals to be met by: 8/19/2024     Patient will increase functional independence with ADLs by performing:    UE Dressing with Set-up Assistance.  LE Dressing with Set-up Assistance.  Grooming while standing at sink with Modified [place].  Toileting from toilet with Modified [place] for hygiene and clothing management.   Supine to sit with Modified  "Door.  Step transfer with Modified Door.  Toilet transfer to toilet with Modified Door.                         History:     Past Medical History:   Diagnosis Date    A-fib     Alcohol abuse     Anxiety     Arthritis     Chronic gout     Diabetes mellitus     DM (diabetes mellitus) 11/16/2016    "boarderline, not taking any meds currently"    Fatty liver     Hyperlipidemia     Renal cell cancer 2014    S/P TKR (total knee replacement), right 06/27/2019         Past Surgical History:   Procedure Laterality Date    ABSCESS DRAINAGE      perirectal    COLONOSCOPY      ENDOSCOPIC ULTRASOUND OF UPPER GASTROINTESTINAL TRACT N/A 6/11/2024    Procedure: ULTRASOUND, UPPER GI TRACT, ENDOSCOPIC;  Surgeon: Mode Wood MD;  [facility];  Service: Endoscopy;  Laterality: N/A;  6/7 portal-EUS in 7-10 days please, [facility] or [place]-eliquis approved  Te 6/7-tt  6/10-precall complete-[place]    HAND SURGERY Left     JOINT REPLACEMENT      knee replacement right knee    KIDNEY SURGERY      partial left kidney removal - [place]    PARTIAL NEPHRECTOMY Left August 2014    PERCUTANEOUS CRYOTHERAPY OF PERIPHERAL NERVE USING LIQUID NITROUS OXIDE IN CLOSED NEEDLE DEVICE Right 6/17/2019    Procedure: CRYOTHERAPY, NERVE, PERIPHERAL, PERCUTANEOUS, USING LIQUID NITROUS OXIDE IN CLOSED NEEDLE DEVICE-right knee iovera;  Surgeon: Donny Hair III, MD;  Location: [facility] CATH LAB;  Service: Pain Management;  Laterality: Right;    TOTAL KNEE ARTHROPLASTY Right 6/27/2019    Procedure: ARTHROPLASTY, KNEE, TOTAL-SAME DAY;  Surgeon: Mikael Huerta MD;  Location: [facility] OR [address] [place] [address];  Service: Orthopedics;  Laterality: Right;       Time Tracking:     OT Date of Treatment: 08/05/24  OT Start Time: 1054  OT Stop Time: 1122  OT Total Time (min): 28 min    Billable Minutes:Evaluation 10 min  Self Care/Home Management 18 min    8/5/2024  "

## 2024-08-06 ENCOUNTER — OUTPATIENT CASE MANAGEMENT (OUTPATIENT)
Dept: ADMINISTRATIVE | Facility: OTHER | Age: 69
End: 2024-08-06
Payer: MEDICARE

## 2024-08-06 ENCOUNTER — PATIENT OUTREACH (OUTPATIENT)
Dept: ADMINISTRATIVE | Facility: CLINIC | Age: 69
End: 2024-08-06
Payer: MEDICARE

## 2024-08-06 ENCOUNTER — PATIENT OUTREACH (OUTPATIENT)
Dept: ADMINISTRATIVE | Facility: OTHER | Age: 69
End: 2024-08-06
Payer: MEDICARE

## 2024-08-06 NOTE — PLAN OF CARE
[facility] ([facility])  Discharge Final Note    Primary Care Provider: Bacilio Larry MD    Expected Discharge Date: 8/5/2024    Final Discharge Note (most recent)       Final Note - 08/06/24 0925          Final Note    Assessment Type Final Discharge Note (P)      Anticipated Discharge Disposition Home or Self Care (P)      What phone number can be called within the next 1-3 days to see how you are doing after discharge? [phone] (P)         Post-Acute Status    Post-Acute Authorization Other (P)      Coverage [employer] MANAGED MEDICARE - HU[employer]NA MEDICARE HMO - CAPITATED (P)      Other Status Awaiting f/u Appts (P)                      Important Message from Medicare             Contact Info       Bacilio Larry MD   Specialty: Internal Medicine   Relationship: PCP - General    [address]  [facility] [address]   Phone: [phone]       Next Steps: Schedule an appointment as soon as possible for a visit    [facility] 5th [address]   Specialty: Orthopedics    [address]  [facility] [address]   Phone: [phone]       Next Steps: Schedule an appointment as soon as possible for a visit          Patient discharged home w/ family.                          JOSSY Terrell, LMSW  [facility]  Case Management  Ext. [phone]

## 2024-08-08 ENCOUNTER — TELEPHONE (OUTPATIENT)
Dept: ENDOCRINOLOGY | Facility: CLINIC | Age: 69
End: 2024-08-08
Payer: MEDICARE

## 2024-08-08 DIAGNOSIS — I48.0 PAROXYSMAL ATRIAL FIBRILLATION: Primary | ICD-10-CM

## 2024-08-09 ENCOUNTER — TELEPHONE (OUTPATIENT)
Dept: INTERNAL MEDICINE | Facility: CLINIC | Age: 69
End: 2024-08-09
Payer: MEDICARE

## 2024-08-09 DIAGNOSIS — R41.3 MEMORY LOSS: ICD-10-CM

## 2024-08-09 DIAGNOSIS — F10.20 ALCOHOL USE DISORDER, SEVERE, DEPENDENCE: Primary | ICD-10-CM

## 2024-08-12 ENCOUNTER — TELEPHONE (OUTPATIENT)
Dept: ELECTROPHYSIOLOGY | Facility: CLINIC | Age: 69
End: 2024-08-12
Payer: MEDICARE

## 2024-08-13 ENCOUNTER — OUTPATIENT CASE MANAGEMENT (OUTPATIENT)
Dept: ADMINISTRATIVE | Facility: OTHER | Age: 69
End: 2024-08-13
Payer: MEDICARE

## 2024-08-13 ENCOUNTER — TELEPHONE (OUTPATIENT)
Dept: ELECTROPHYSIOLOGY | Facility: CLINIC | Age: 69
End: 2024-08-13
Payer: MEDICARE

## 2024-08-13 NOTE — LETTER
Donald Abadie  [address] [place] [address]      Dear Donald Abadie,     I am your nurse with [facility]s Outpatient Care Management Department. I was unsuccessful in reaching you today. At your earliest convenience, I would like to discuss your healthcare progress.      Please contact me at [phone] from 8:00AM to 4:30 PM on Monday thru Friday.     As you know, [facility] is a program offered to you through [facility] where a nurse is available 24/7 to answer questions or provide medical advice, their number is [phone].    Thanks,    Mar Parry, RN  Outpatient Care Management

## 2024-08-15 ENCOUNTER — TELEPHONE (OUTPATIENT)
Dept: INTERNAL MEDICINE | Facility: CLINIC | Age: 69
End: 2024-08-15
Payer: MEDICARE

## 2024-08-15 NOTE — TELEPHONE ENCOUNTER
----- Message from Britta Sadiamoe Hansen sent at 8/15/2024  2:21 PM CDT -----  Contact: [phone]  1MEDICALADVICE     Patient is calling for Medical Advice regarding:pt had a hospital f/u today for 12pm 8/15/24. He overslept he wants to schedule the faith and need to be seen. The earliest I was pulling up was August 27, that far away.  Do you have something sooner please call pt and advise.    How long has patient had these symptoms:    Pharmacy name and phone#:    Patient wants a call back or thru [facility]:callback    Comments:    Please advise patient replies from provider may take up to 48 hours.

## 2024-08-15 NOTE — TELEPHONE ENCOUNTER
08/27/2024 10 am attempted to contact lvm informing soonest opening Tuesday 08/27/24 at 10 am and pt is scheduled. Appt added to wait list in case someone cancels.

## 2024-08-15 NOTE — TELEPHONE ENCOUNTER
----- Message from Jo Ann Gallardo sent at 8/15/2024 10:03 AM CDT -----  Name of Who is Calling:ABADIE, DONALD WARREN [[id number]]  Chani(Daughter)        What is the request in detail: Pt daughter will like to speak to the DrJf Before her dad visit today please advise .        Can the clinic reply by [facility]: no call back         What Number to [facility] Back [facility]: [phone]

## 2024-08-15 NOTE — TELEPHONE ENCOUNTER
----- Message from Marlene Clemons sent at 8/15/2024  2:44 PM CDT -----  Contact: [phone] Patient  Caller is requesting an earlier appointment then we can schedule.  Caller is requesting a message be sent to the provider.    If this is for urgent care symptoms, did you offer other providers at this location, providers at other locations, or [facility]? (yes, no, n/a):  pt refused    If this is for the patients physical, did you offer to schedule next available and put on wait list, or to see NP or PA for their physical?  (yes, no, n/a):  wait listed    When is the next available appointment with their provider:  08/27/24    Reason for the appointment:  f/u on arm    Patient preference of timeframe to be scheduled:  ASAP    Would the patient like a call back, or a response through their [facility] portal?:   Call back    Comments:

## 2024-08-17 DIAGNOSIS — N50.811 TESTICULAR PAIN, RIGHT: ICD-10-CM

## 2024-08-17 DIAGNOSIS — R10.31 RIGHT INGUINAL PAIN: ICD-10-CM

## 2024-08-19 RX ORDER — GABAPENTIN 300 MG/1
300 CAPSULE ORAL 2 TIMES DAILY
Qty: 60 CAPSULE | Refills: 1 | Status: SHIPPED | OUTPATIENT
Start: 2024-08-19

## 2024-08-20 PROBLEM — M62.89 PELVIC FLOOR DYSFUNCTION: Status: ACTIVE | Noted: 2024-08-20

## 2024-08-21 ENCOUNTER — OFFICE VISIT (OUTPATIENT)
Dept: SPORTS MEDICINE | Facility: CLINIC | Age: 69
End: 2024-08-21
Payer: MEDICARE

## 2024-08-21 ENCOUNTER — HOSPITAL ENCOUNTER (OUTPATIENT)
Dept: RADIOLOGY | Facility: HOSPITAL | Age: 69
Discharge: HOME | End: 2024-08-21
Attending: ORTHOPAEDIC SURGERY
Payer: MEDICARE

## 2024-08-21 VITALS
SYSTOLIC BLOOD PRESSURE: 129 MMHG | WEIGHT: 173.5 LBS | HEART RATE: 83 BPM | DIASTOLIC BLOOD PRESSURE: 80 MMHG | HEIGHT: 66 IN | BODY MASS INDEX: 27.88 KG/M2

## 2024-08-21 DIAGNOSIS — S49.91XA INJURY OF RIGHT SHOULDER, INITIAL ENCOUNTER: ICD-10-CM

## 2024-08-21 DIAGNOSIS — M25.511 ACUTE PAIN OF RIGHT SHOULDER: ICD-10-CM

## 2024-08-21 DIAGNOSIS — I48.91 ATRIAL FIBRILLATION WITH RVR: Primary | ICD-10-CM

## 2024-08-21 DIAGNOSIS — S49.90XA SHOULDER INJURY: ICD-10-CM

## 2024-08-21 DIAGNOSIS — I48.4 ATYPICAL ATRIAL FLUTTER: ICD-10-CM

## 2024-08-21 DIAGNOSIS — S42.91XA TRAUMATIC CLOSED DISPLACED FRACTURE OF RIGHT SHOULDER WITH ANTERIOR DISLOCATION, INITIAL ENCOUNTER: ICD-10-CM

## 2024-08-21 PROCEDURE — 1111F DSCHRG MED/CURRENT MED MERGE: CPT | Mod: CPTII,S$GLB,, | Performed by: ORTHOPAEDIC SURGERY

## 2024-08-21 PROCEDURE — 99999 PR PBB SHADOW E&M-EST. PATIENT-LVL IV: CPT | Mod: PBBFAC,,, | Performed by: ORTHOPAEDIC SURGERY

## 2024-08-21 PROCEDURE — 3008F BODY MASS INDEX DOCD: CPT | Mod: CPTII,S$GLB,, | Performed by: ORTHOPAEDIC SURGERY

## 2024-08-21 PROCEDURE — 3288F FALL RISK ASSESSMENT DOCD: CPT | Mod: CPTII,S$GLB,, | Performed by: ORTHOPAEDIC SURGERY

## 2024-08-21 PROCEDURE — 1125F AMNT PAIN NOTED PAIN PRSNT: CPT | Mod: CPTII,S$GLB,, | Performed by: ORTHOPAEDIC SURGERY

## 2024-08-21 PROCEDURE — 1159F MED LIST DOCD IN RCRD: CPT | Mod: CPTII,S$GLB,, | Performed by: ORTHOPAEDIC SURGERY

## 2024-08-21 PROCEDURE — 73030 X-RAY EXAM OF SHOULDER: CPT | Mod: TC,RT

## 2024-08-21 PROCEDURE — 3074F SYST BP LT 130 MM HG: CPT | Mod: CPTII,S$GLB,, | Performed by: ORTHOPAEDIC SURGERY

## 2024-08-21 PROCEDURE — 99204 OFFICE O/P NEW MOD 45 MIN: CPT | Mod: S$GLB,,, | Performed by: ORTHOPAEDIC SURGERY

## 2024-08-21 PROCEDURE — 3044F HG A1C LEVEL LT 7.0%: CPT | Mod: CPTII,S$GLB,, | Performed by: ORTHOPAEDIC SURGERY

## 2024-08-21 PROCEDURE — 1100F PTFALLS ASSESS-DOCD GE2>/YR: CPT | Mod: CPTII,S$GLB,, | Performed by: ORTHOPAEDIC SURGERY

## 2024-08-21 PROCEDURE — 3079F DIAST BP 80-89 MM HG: CPT | Mod: CPTII,S$GLB,, | Performed by: ORTHOPAEDIC SURGERY

## 2024-08-21 RX ORDER — PREGABALIN 75 MG/1
75 CAPSULE ORAL 2 TIMES DAILY
Qty: 60 CAPSULE | Refills: 6 | Status: SHIPPED | OUTPATIENT
Start: 2024-08-21 | End: 2025-03-19

## 2024-08-21 NOTE — PROGRESS NOTES
Subjective:          Chief Complaint: Donald Warren Abadie is a 68 y.o. male who had concerns including Pain of the Right Shoulder.    Donald Warren Abadie is a 68 y.o. male, right handed retiree here for evaluation of rightupper arm and shoulder.The pain started 1 weeks ago History of injury: no, fall on shoulder in driveway and is becoming progressively worse. Pain is located over (points to) Right Shoulder. He reports that the pain is a 10 /10 pressure, sharp, and aching pain today. He reports NSAIDS Diclofenac Gel with mild improvement previous treatments. Pain is 10 /10 at its worst. Is affecting ADLs and limiting desired level of activity. Denies numbness, tingling, radiation, and neck pain or radicular symptoms.     Mechanical symptoms:painful click  Subjective instability: (+)   Worse with elevation, activity, lifting, work, and repetitive activity  Better with OTC NSAIDS  Nocturnal symptoms: (+)    No previous surgeries or trauma on dislocated the shoulder with the fall and he had to go to the ER for reduction and returns for follow-up.         Review of Systems   Constitutional: Negative for fever and night sweats.   HENT:  Negative for hearing loss.    Eyes:  Negative for blurred vision and visual disturbance.   Cardiovascular:  Negative for chest pain and leg swelling.   Respiratory:  Negative for shortness of breath.    Endocrine: Negative for polyuria.   Hematologic/Lymphatic: Negative for bleeding problem.   Skin:  Negative for rash.   Musculoskeletal:  Negative for back pain, joint pain, joint swelling, muscle cramps and muscle weakness.   Gastrointestinal:  Negative for melena.   Genitourinary:  Negative for hematuria.   Neurological:  Negative for loss of balance, numbness and paresthesias.   Psychiatric/Behavioral:  Negative for altered mental status.                    Objective:        General: Kleber is well-developed, well-nourished, appears stated age, in no acute distress, alert and oriented  to time, place and person.     General    Vitals reviewed.  Constitutional: He is oriented to person, place, and time. He appears well-developed and well-nourished. No distress.   HENT:   Nose: Nose normal.   Mouth/Throat: No oropharyngeal exudate.   Eyes: Pupils are equal, round, and reactive to light. Right eye exhibits no discharge. Left eye exhibits no discharge.   Cardiovascular:  Normal rate and intact distal pulses.            Pulmonary/Chest: Effort normal and breath sounds normal. No respiratory distress.   Neurological: He is alert and oriented to person, place, and time. He has normal reflexes. He displays normal reflexes. No cranial nerve deficit. Coordination normal.   Psychiatric: He has a normal mood and affect. His behavior is normal. Judgment and thought content normal.         Right Shoulder Exam     Inspection/Observation   Swelling: present  Bruising: present  Scars: absent  Deformity: absent  Scapular Winging: absent  Scapular Dyskinesia: negative  Atrophy: absent    Tenderness   The patient is tender to palpation of the greater tuberosity.    Range of Motion   Active abduction:  30 abnormal   Passive abduction:  30 abnormal   Extension:  0 normal   Forward Flexion:  50 abnormal   Forward Elevation: 60 abnormal  Adduction: 20 abnormal  External Rotation 0 degrees:  10 normal   External Rotation 90 degrees: 10 abnormal  Internal rotation 0 degrees:  Sacrum normal   Internal rotation 90 degrees:  10 normal     Tests & Signs   Apprehension: negative  Cross arm: negative  Drop arm: negative  Carrillo test: positive  Impingement: positive  Sulcus: absent  Anterosuperior Escape: negative  Lag Sign 0 degrees: negative  Lag Sign 90 degrees: negative  Lift Off Sign: negative  Belly Press: negative  Active Compression Test (Pendleton's Sign): negative  Yergason's Test: negative  Speed's Test: negative  Anterior Drawer Test: 1+   Posterior Drawer Test: 1+  Relocation 90 degrees: negative  Relocation > 90  degrees: negative  Bear Hug: negative  Moving Valgus: negative  Jerk Test: negative    Other   Sensation: normal    Left Shoulder Exam     Inspection/Observation   Swelling: absent  Bruising: absent  Scars: absent  Deformity: absent  Scapular Winging: absent  Scapular Dyskinesia: negative  Atrophy: absent    Tenderness   The patient is experiencing no tenderness.     Range of Motion   Active abduction:  90 normal   Passive abduction:  100 normal   Extension:  0 normal   Forward Flexion:  180 normal   Forward Elevation: 180 normal  Adduction: 40 normal  External Rotation 0 degrees:  50 normal   External Rotation 90 degrees: 90 normal  Internal rotation 0 degrees:  T8 normal   Internal rotation 90 degrees:  30 normal     Tests & Signs   Apprehension: negative  Cross arm: negative  Drop arm: negative  Carrillo test: negative  Impingement: negative  Sulcus: absent  Anterosuperior Escape: negative  Lag Sign 0 degrees: negative  Lag Sign 90 degrees: negative  Lift Off Sign: negative  Belly Press: negative  Active Compression test (Grafton's Sign): negative  Yergasons's Test: negative  Speed's Test: negative  Anterior Drawer Test: 1+  Posterior Drawer Test: 1+  Relocation 90 degrees: negative  Relocation > 90 degrees: negative  Bear Hug: negative  Moving Valgus: negative  Jerk Test: negative    Other   Sensation: normal       Muscle Strength   Right Upper Extremity   Shoulder Abduction: 5/5   Shoulder Internal Rotation: 5/5   Shoulder External Rotation: 4/5   Supraspinatus: 4/5   Subscapularis: 4/5   Biceps: 5/5   Left Upper Extremity  Shoulder Abduction: 5/5   Shoulder Internal Rotation: 5/5   Shoulder External Rotation: 5/5   Supraspinatus: 5/5   Subscapularis: 5/5   Biceps: 5/5     Reflexes     Left Side  Biceps:  2+  Triceps:  2+  Brachioradialis:  2+    Right Side   Biceps:  2+  Triceps:  2+  Brachioradialis:  2+    Vascular Exam     Right Pulses      Radial:                    2+      Left Pulses      Radial:                     2+      Capillary Refill  Right Hand: normal capillary refill  Left Hand: normal capillary refill        X-ray Shoulder 2 or More Views Right  Narrative: EXAMINATION:  XR SHOULDER COMPLETE 2 OR MORE VIEWS RIGHT    CLINICAL HISTORY:  Unspecified injury of shoulder and upper arm, unspecified arm, initial encounter    TECHNIQUE:  Two or three views of the right shoulder were performed.    COMPARISON:  None 08/04/2024    FINDINGS:  Healing compression fracture in the area of the major tuberculum.  Mild DJD.  The humeral head is in the glenoid fossa.  No bone destruction identified  Impression: See above    Electronically signed by: Mode Lundberg MD  Date:    08/21/2024  Time:    13:17             Assessment:       Encounter Diagnoses   Name Primary?    Injury of right shoulder, initial encounter     Atrial fibrillation with RVR Yes    Atypical atrial flutter     Acute pain of right shoulder     Traumatic closed displaced fracture of right shoulder with anterior dislocation, initial encounter           Plan:       1. RTC in 6 weeks with Dr. Elodia Johnson. ASES and SF-12 was filled out today in clinic. Patient will fill out ASES, SF-12 on return.    2. Medications: Refills of the following Rx were sent to patients preferred Pharmacy:  No Refills Needed Today  Lyrica initiated    3. Physical Therapy: Continue/Begin: Begin at [facility]       4. HEP: N/A    5. Procedures/Procedural Planning:   N/A    6. DME: N/A    7. Work/Sport Status: no overhead use of the right shoulder    8. Visit Summary: We will initiate PT and see how he responds. He is a poor surgical candidate due to A.Fibrillation and use of metoprolol and Elaquis. If he does not respond well we will  assess for a rotator cuff tear with MRI.  His Daughter is a nurse at [employer].                           [facility] patient questionnaires have been collected today.

## 2024-08-27 ENCOUNTER — OFFICE VISIT (OUTPATIENT)
Dept: INTERNAL MEDICINE | Facility: CLINIC | Age: 69
End: 2024-08-27
Payer: MEDICARE

## 2024-08-27 ENCOUNTER — TELEPHONE (OUTPATIENT)
Dept: INTERNAL MEDICINE | Facility: CLINIC | Age: 69
End: 2024-08-27
Payer: MEDICARE

## 2024-08-27 ENCOUNTER — LAB VISIT (OUTPATIENT)
Dept: LAB | Facility: HOSPITAL | Age: 69
End: 2024-08-27
Attending: INTERNAL MEDICINE
Payer: MEDICARE

## 2024-08-27 VITALS
SYSTOLIC BLOOD PRESSURE: 130 MMHG | HEIGHT: 66 IN | WEIGHT: 178.56 LBS | BODY MASS INDEX: 28.7 KG/M2 | OXYGEN SATURATION: 96 % | HEART RATE: 71 BPM | DIASTOLIC BLOOD PRESSURE: 80 MMHG

## 2024-08-27 DIAGNOSIS — F55.8 ABUSE OF OTHER NON-PSYCHOACTIVE SUBSTANCES: ICD-10-CM

## 2024-08-27 DIAGNOSIS — F19.90 DRUG USE: ICD-10-CM

## 2024-08-27 DIAGNOSIS — K76.0 FATTY LIVER: ICD-10-CM

## 2024-08-27 DIAGNOSIS — R41.3 OTHER AMNESIA: Primary | ICD-10-CM

## 2024-08-27 DIAGNOSIS — I48.91 ATRIAL FIBRILLATION WITH RVR: ICD-10-CM

## 2024-08-27 DIAGNOSIS — F10.20 ALCOHOLISM: ICD-10-CM

## 2024-08-27 LAB
ALBUMIN SERPL BCP-MCNC: 3.8 G/DL (ref 3.5–5.2)
ALP SERPL-CCNC: 84 U/L (ref 55–135)
ALT SERPL W/O P-5'-P-CCNC: 18 U/L (ref 10–44)
ANION GAP SERPL CALC-SCNC: 13 MMOL/L (ref 8–16)
AST SERPL-CCNC: 36 U/L (ref 10–40)
BASOPHILS # BLD AUTO: 0.05 K/UL (ref 0–0.2)
BASOPHILS NFR BLD: 0.9 % (ref 0–1.9)
BILIRUB SERPL-MCNC: 0.5 MG/DL (ref 0.1–1)
BUN SERPL-MCNC: 12 MG/DL (ref 8–23)
CALCIUM SERPL-MCNC: 9 MG/DL (ref 8.7–10.5)
CHLORIDE SERPL-SCNC: 98 MMOL/L (ref 95–110)
CO2 SERPL-SCNC: 21 MMOL/L (ref 23–29)
CREAT SERPL-MCNC: 1.1 MG/DL (ref 0.5–1.4)
DIFFERENTIAL METHOD BLD: ABNORMAL
EOSINOPHIL # BLD AUTO: 0.2 K/UL (ref 0–0.5)
EOSINOPHIL NFR BLD: 3.2 % (ref 0–8)
ERYTHROCYTE [DISTWIDTH] IN BLOOD BY AUTOMATED COUNT: 14.2 % (ref 11.5–14.5)
EST. GFR  (NO RACE VARIABLE): >60 ML/MIN/1.73 M^2
GLUCOSE SERPL-MCNC: 107 MG/DL (ref 70–110)
HCT VFR BLD AUTO: 39.7 % (ref 40–54)
HGB BLD-MCNC: 13.9 G/DL (ref 14–18)
IMM GRANULOCYTES # BLD AUTO: 0.02 K/UL (ref 0–0.04)
IMM GRANULOCYTES NFR BLD AUTO: 0.4 % (ref 0–0.5)
LYMPHOCYTES # BLD AUTO: 1.6 K/UL (ref 1–4.8)
LYMPHOCYTES NFR BLD: 29.1 % (ref 18–48)
MCH RBC QN AUTO: 33.7 PG (ref 27–31)
MCHC RBC AUTO-ENTMCNC: 35 G/DL (ref 32–36)
MCV RBC AUTO: 96 FL (ref 82–98)
MONOCYTES # BLD AUTO: 0.7 K/UL (ref 0.3–1)
MONOCYTES NFR BLD: 13 % (ref 4–15)
NEUTROPHILS # BLD AUTO: 3 K/UL (ref 1.8–7.7)
NEUTROPHILS NFR BLD: 53.4 % (ref 38–73)
NRBC BLD-RTO: 0 /100 WBC
PLATELET # BLD AUTO: 136 K/UL (ref 150–450)
PMV BLD AUTO: 9 FL (ref 9.2–12.9)
POTASSIUM SERPL-SCNC: 3.9 MMOL/L (ref 3.5–5.1)
PROT SERPL-MCNC: 7.1 G/DL (ref 6–8.4)
RBC # BLD AUTO: 4.13 M/UL (ref 4.6–6.2)
SODIUM SERPL-SCNC: 132 MMOL/L (ref 136–145)
WBC # BLD AUTO: 5.63 K/UL (ref 3.9–12.7)

## 2024-08-27 PROCEDURE — 3075F SYST BP GE 130 - 139MM HG: CPT | Mod: CPTII,S$GLB,, | Performed by: INTERNAL MEDICINE

## 2024-08-27 PROCEDURE — 1100F PTFALLS ASSESS-DOCD GE2>/YR: CPT | Mod: CPTII,S$GLB,, | Performed by: INTERNAL MEDICINE

## 2024-08-27 PROCEDURE — 99999 PR PBB SHADOW E&M-EST. PATIENT-LVL V: CPT | Mod: PBBFAC,,, | Performed by: INTERNAL MEDICINE

## 2024-08-27 PROCEDURE — 99214 OFFICE O/P EST MOD 30 MIN: CPT | Mod: S$GLB,,, | Performed by: INTERNAL MEDICINE

## 2024-08-27 PROCEDURE — 1125F AMNT PAIN NOTED PAIN PRSNT: CPT | Mod: CPTII,S$GLB,, | Performed by: INTERNAL MEDICINE

## 2024-08-27 PROCEDURE — 3008F BODY MASS INDEX DOCD: CPT | Mod: CPTII,S$GLB,, | Performed by: INTERNAL MEDICINE

## 2024-08-27 PROCEDURE — 80326 AMPHETAMINES 5 OR MORE: CPT | Performed by: INTERNAL MEDICINE

## 2024-08-27 PROCEDURE — 3044F HG A1C LEVEL LT 7.0%: CPT | Mod: CPTII,S$GLB,, | Performed by: INTERNAL MEDICINE

## 2024-08-27 PROCEDURE — 3079F DIAST BP 80-89 MM HG: CPT | Mod: CPTII,S$GLB,, | Performed by: INTERNAL MEDICINE

## 2024-08-27 PROCEDURE — 36415 COLL VENOUS BLD VENIPUNCTURE: CPT | Performed by: INTERNAL MEDICINE

## 2024-08-27 PROCEDURE — 1111F DSCHRG MED/CURRENT MED MERGE: CPT | Mod: CPTII,S$GLB,, | Performed by: INTERNAL MEDICINE

## 2024-08-27 PROCEDURE — 1159F MED LIST DOCD IN RCRD: CPT | Mod: CPTII,S$GLB,, | Performed by: INTERNAL MEDICINE

## 2024-08-27 PROCEDURE — 80053 COMPREHEN METABOLIC PANEL: CPT | Performed by: INTERNAL MEDICINE

## 2024-08-27 PROCEDURE — 85025 COMPLETE CBC W/AUTO DIFF WBC: CPT | Performed by: INTERNAL MEDICINE

## 2024-08-27 PROCEDURE — 80364 OPIOID &OPIATE ANALOG 5/MORE: CPT | Performed by: INTERNAL MEDICINE

## 2024-08-27 PROCEDURE — 3288F FALL RISK ASSESSMENT DOCD: CPT | Mod: CPTII,S$GLB,, | Performed by: INTERNAL MEDICINE

## 2024-08-27 NOTE — PROGRESS NOTES
CC:  Hospital follow-up     HPI:  The patient is a 68-year-old male with AFib, continued alcohol use, hypertension, hyperlipidemia, elevated triglycerides, type 2 diabetes, reflux, gout, renal cell cancer status post nephrectomy and anxiety who presents as a hospital follow-up along with his daughter, grandson and granddaughter.  The patient has recently seen in the emergency department with complaints of right shoulder pain status post fall.  His shoulder was found to be dislocated.  This has reduced in the emergency department.  The patient was admitted for alcohol intoxication and uncontrolled AFib.  The patient was seen by Psychiatry who recommended a Valium taper.  His Multaq was increased to b.I.d. for his AFib.    ROS:  The patient does report some right arm pain.  His daughter reports that the patient began drinking shortly after his discharge home.  She reports he was drinking about a case a day.  He is also smoking marijuana.  On last visit with me, he had complain of right flank and groin pain with bending over.  He has no complaints concerning this today.    Physical exam:   General appearance: No acute distress   HEENT: Conjunctiva is clear.  The patient has no photophobia.  TMs are partially obscured by wax.  Nasal septum is midline without discharge.  Oropharynx is without erythema.  Trachea is midline without JVD.    Pulmonary: Good inspiratory, expiratory breath sounds are heard.  Lungs are clear to auscultation.    Cardiovascular:  S1-S2, rhythm is regular.  Extremities with trace edema.    GI:  Abdomen was nontender, nondistended I did not appreciate any hepatosplenomegaly.  He had normoactive bowel sounds    Assessment:    Continued EtOH use  2.  AFib   3.  Shoulder dislocation with compression fracture  Plan:    Case was discussed in front of patient with his daughter.  She will be contacting adult protective Services.  2.  Will check a CBC, CMP and urine drug screen.

## 2024-08-27 NOTE — TELEPHONE ENCOUNTER
----- Message from Madelaine Root sent at 8/26/2024  3:52 PM CDT -----  Contact: Daughter/Chani/[phone]  1MEDICALADVICE     Patient is calling for Medical Advice regarding: appointment/Fmla     How long has patient had these symptoms:Na    Pharmacy name and phone#:NA    Patient wants a call back or thru [facility]:jed back     Comments: pt's daughter(Chani) said that she is calling in regards to needing to speak with the nurse about Fmla paperwork and concerns about patient . Please advise     Please advise patient replies from provider may take up to 48 hours.

## 2024-08-28 ENCOUNTER — CLINICAL SUPPORT (OUTPATIENT)
Dept: REHABILITATION | Facility: HOSPITAL | Age: 69
End: 2024-08-28
Attending: ORTHOPAEDIC SURGERY
Payer: MEDICARE

## 2024-08-28 DIAGNOSIS — M25.511 ACUTE PAIN OF RIGHT SHOULDER: ICD-10-CM

## 2024-08-28 DIAGNOSIS — R53.1 WEAKNESS: ICD-10-CM

## 2024-08-28 DIAGNOSIS — M25.60 STIFFNESS IN JOINT: Primary | ICD-10-CM

## 2024-08-28 PROCEDURE — 97161 PT EVAL LOW COMPLEX 20 MIN: CPT | Mod: HCNC | Performed by: PHYSICAL THERAPIST

## 2024-08-28 PROCEDURE — 97110 THERAPEUTIC EXERCISES: CPT | Mod: HCNC | Performed by: PHYSICAL THERAPIST

## 2024-08-29 ENCOUNTER — OUTPATIENT CASE MANAGEMENT (OUTPATIENT)
Dept: ADMINISTRATIVE | Facility: OTHER | Age: 69
End: 2024-08-29
Payer: MEDICARE

## 2024-08-29 DIAGNOSIS — E78.1 HYPERTRIGLYCERIDEMIA: ICD-10-CM

## 2024-08-29 RX ORDER — ROSUVASTATIN CALCIUM 20 MG/1
TABLET, COATED ORAL
Qty: 90 TABLET | Refills: 3 | Status: SHIPPED | OUTPATIENT
Start: 2024-08-29

## 2024-08-29 NOTE — PROGRESS NOTES
Outpatient Care Management  Plan of Care Follow Up Visit    Patient: Donald Warren Abadie  MRN: [id number]  Date of Service: 08/29/2024  Completed by: Tiana Parry RN  Referral Date: 08/05/2024    Reason for Visit   Patient presents with    Case Closure       Brief Summary: Case closure, unable to connect with patient.

## 2024-08-30 PROBLEM — M25.60 STIFFNESS IN JOINT: Status: ACTIVE | Noted: 2024-08-30

## 2024-08-30 PROBLEM — R53.1 WEAKNESS: Status: ACTIVE | Noted: 2024-08-30

## 2024-08-30 NOTE — PLAN OF CARE
[facility]   Physical Therapy Initial Evaluation      Name: Kleber Schuster Abadie  Clinic Number: [id number]    Therapy Diagnosis:   Encounter Diagnoses   Name Primary?    Acute pain of right shoulder     Stiffness in joint Yes    Weakness         Physician: Elodia Johnson MD    Physician Orders: PT Eval and Treat   Medical Diagnosis from Referral:   Evaluation Date: 8/28/2024  Authorization Period Expiration: 1/24/25  Plan of Care Expiration: 12/31/24  Progress Note Due: 9/31/24  Visit # / Visits authorized: 1/ 1   FOTO: 1/1    Precautions: Standard     Time In: 1200  Time Out: 1300  Total Appointment Time (timed & untimed codes): 60 minutes    Subjective     Date of onset: 2 weeks    History of current condition - Kleber reports: .The pain started 2 weeks ago when he fell at home. Went to ED with disolocation that was reduced in ED. Referred to PT for conservative management. . Locates pain lateral shoulder and anterior shoulder. Pain and difficulty with AROM, PROM. Is affecting ADLs and limiting desired level of activity. Denies numbness, tingling, radiation, and neck pain or radicular symptoms.        Falls: 2 weeks ago. Daughter present and reports 4 falls in the last month. State he has hx of alcoholism. States he has been drinking this morning.     Imaging: :   EXAMINATION:  XR SHOULDER COMPLETE 2 OR MORE VIEWS RIGHT     CLINICAL HISTORY:  Unspecified injury of shoulder and upper arm, unspecified arm, initial encounter     TECHNIQUE:  Two or three views of the right shoulder were performed.     COMPARISON:  None 08/04/2024     FINDINGS:  Healing compression fracture in the area of the major tuberculum.  Mild DJD.  The humeral head is in the glenoid fossa.  No bone destruction identified     Impression:     See above       Prior Therapy: none  Social History:  lives alone, daughter currently taking care of him  Occupation: retired  Prior Level of Function: independent with full use  "of YANETE  Current Level of Function: limited secondary to injury    Pain:  Current 3/10, worst 10/10, best 2/10   Location: right shoulder    Description: Aching, Dull, and Sharp  Aggravating Factors: movement  Easing Factors: rest, meds    Patients goals: full use of arm     Medical History:   Past Medical History:   Diagnosis Date    A-fib     Alcohol abuse     Anxiety     Arthritis     Chronic gout     Diabetes mellitus     DM (diabetes mellitus) 11/16/2016    "boarderline, not taking any meds currently"    Fatty liver     Hyperlipidemia     Renal cell cancer 2014    S/P TKR (total knee replacement), right 06/27/2019       Surgical History:   Donald Warren Abadie  has a past surgical history that includes Abscess drainage; Hand surgery (Left); Partial nephrectomy (Left, August 2014); Colonoscopy; Kidney surgery; Percutaneous cryotherapy of peripheral nerve using liquid nitrous oxide in closed needle device (Right, 6/17/2019); Total knee arthroplasty (Right, 6/27/2019); Joint replacement; and Endoscopic ultrasound of upper gastrointestinal tract (N/A, 6/11/2024).    Medications:   Kleber has a current medication list which includes the following prescription(s): allopurinol, cyanocobalamin, diazepam, diclofenac sodium, dronedarone, eliquis, folic acid, gabapentin, hydrocodone-acetaminophen, ketoconazole, magnesium oxide, metformin, metoprolol succinate, multivitamin, omega-3 acid ethyl esters, pantoprazole, pregabalin, ropinirole, rosuvastatin, sertraline, tamsulosin, and vitamin b-1.    Allergies:   Review of patient's allergies indicates:   Allergen Reactions    No known drug allergies         Objective      Observation: pt presents in gym. No distress noted. Pt is hard of hearing and does not have hearing aides today. Slurring speech. Residual bruising noted along anterior proximal shoulder    Posture: FHRS posture      Passive Range of Motion (*= pain):   Shoulder Right Left   Flexion 30* 155   Abduction 15* 90 "   ER at 0 5* 45   ER at 90 N/t N/t   IR 5* 40      Active Range of Motion(*= pain):   Shoulder Right Left   Flexion 10* 150   Abduction 0* 90   ER at 0 0* 40   ER at 90 N/t 85     Strength (*= pain):  Shoulder Right Left   Flexion 1 5   Abduction 1 5   ER 1 5   IR 1 5   Elbow flx 3 5   Elbow ext 3+ 5   *pain with all right shoulder testing    Special Tests:  Unable to perform special test at this time due to lack of ROM     Scapular Control/Dyskinesis:     Normal / Subtle / Obvious  Comments    Left  N/t    Right  N/t        Joint Mobility: guarded    Palpation: tenderness to lateral and posterior humeral head RUE    Proximal/distal screen:   Cervical: full AROM, negative spurling jesus    Sensation: pt reports decrease sensation soft tough throughout R hand      Limitation/Restriction for FOTO  Survey    Therapist reviewed FOTO scores for Donald Warren Abadie on 8/28/2024.   FOTO documents entered into EPIC - see Media section.    Limitation Score: see media         Treatment     Total Treatment time (time-based codes) separate from Evaluation: 20 minutes      Kleber received the treatments listed below:      therapeutic exercises to develop ROM for 20 minutes including:  Reviewed hep      Patient Education and Home Exercises     Education provided:   - reviewed hep, timelines    Written Home Exercises Provided: yes. Exercises were reviewed and Kleber was able to demonstrate them prior to the end of the session.  Kleber demonstrated good  understanding of the education provided. See EMR under Patient Instructions for exercises provided during therapy sessions.    Assessment     Kleber is a 68 y.o. male referred to outpatient Physical Therapy with a medical diagnosis ofM25.511 (ICD-10-CM) - Acute pain of right shoulder  . Patient presents with decreased ROM, pain, stiffness, weakness, decreaased functional mobility secondary to the above dx. Pt would benefit from skilled PT in order to maximize function.     Patient  prognosis is Guarded.   Patient will benefit from skilled outpatient Physical Therapy to address the deficits stated above and in the chart below, provide patient /family education, and to maximize patientt's level of independence.     Plan of care discussed with patient: Yes  Patient's spiritual, cultural and educational needs considered and patient is agreeable to the plan of care and goals as stated below:     Anticipated Barriers for therapy: DM, EOT    Medical Necessity is demonstrated by the following  History  Co-morbidities and personal factors that may impact the plan of care [] LOW: no personal factors / co-morbidities  [x] MODERATE: 1-2 personal factors / co-morbidities  [] HIGH: 3+ personal factors / co-morbidities     Moderate / High Support Documentation:   Co-morbidities affecting plan of care: NA     Personal Factors:   no deficits      Examination  Body Structures and Functions, activity limitations and participation restrictions that may impact the plan of care [x] LOW: addressing 1-2 elements  [] MODERATE: 3+ elements  [] HIGH: 4+ elements (please support below)     Moderate / High Support Documentation: NA      Clinical Presentation [x] LOW: stable  [] MODERATE: Evolving  [] HIGH: Unstable      Decision Making/ Complexity Score: low         Goals:  Short Term Goals: 8 weeks   Patient independent in initial hep  Full PROM  AROM 50% or better  Pt reports 20% improvement in function    Long Term Goals: 16-20 weeks   Pt independent in d/c hep  Full AROM  Strength 75% HHD compared to well side  Pt reports 75% improvement in function  Plan     Plan of care Certification: 8/28/2024 to 12/31/24.    Outpatient Physical Therapy 1-3 times weekly for 20 weeks to include the following interventions: Electrical Stimulation DN, Manual Therapy, Moist Heat/ Ice, Neuromuscular Re-ed, Patient Education, Self Care, Therapeutic Activities, and Therapeutic Exercise.     Murali Uriostegui, PT

## 2024-08-30 NOTE — PROGRESS NOTES
[facility]   Physical Therapy Initial Evaluation      Name: Kleber Schuster Abadie  Clinic Number: [id number]    Therapy Diagnosis:   Encounter Diagnoses   Name Primary?    Acute pain of right shoulder     Stiffness in joint Yes    Weakness         Physician: Elodia Johnson MD    Physician Orders: PT Eval and Treat   Medical Diagnosis from Referral:   Evaluation Date: 8/28/2024  Authorization Period Expiration: 1/24/25  Plan of Care Expiration: 12/31/24  Progress Note Due: 9/31/24  Visit # / Visits authorized: 1/ 1   FOTO: 1/1    Precautions: Standard     Time In: 1200  Time Out: 1300  Total Appointment Time (timed & untimed codes): 60 minutes    Subjective     Date of onset: 2 weeks    History of current condition - Kleber reports: .The pain started 2 weeks ago when he fell at home. Went to ED with disolocation that was reduced in ED. Referred to PT for conservative management. . Locates pain lateral shoulder and anterior shoulder. Pain and difficulty with AROM, PROM. Is affecting ADLs and limiting desired level of activity. Denies numbness, tingling, radiation, and neck pain or radicular symptoms.        Falls: 2 weeks ago. Daughter present and reports 4 falls in the last month. State he has hx of alcoholism. States he has been drinking this morning.     Imaging: :   EXAMINATION:  XR SHOULDER COMPLETE 2 OR MORE VIEWS RIGHT     CLINICAL HISTORY:  Unspecified injury of shoulder and upper arm, unspecified arm, initial encounter     TECHNIQUE:  Two or three views of the right shoulder were performed.     COMPARISON:  None 08/04/2024     FINDINGS:  Healing compression fracture in the area of the major tuberculum.  Mild DJD.  The humeral head is in the glenoid fossa.  No bone destruction identified     Impression:     See above       Prior Therapy: none  Social History:  lives alone, daughter currently taking care of him  Occupation: retired  Prior Level of Function: independent with full use  "of YANETE  Current Level of Function: limited secondary to injury    Pain:  Current 3/10, worst 10/10, best 2/10   Location: right shoulder    Description: Aching, Dull, and Sharp  Aggravating Factors: movement  Easing Factors: rest, meds    Patients goals: full use of arm     Medical History:   Past Medical History:   Diagnosis Date    A-fib     Alcohol abuse     Anxiety     Arthritis     Chronic gout     Diabetes mellitus     DM (diabetes mellitus) 11/16/2016    "boarderline, not taking any meds currently"    Fatty liver     Hyperlipidemia     Renal cell cancer 2014    S/P TKR (total knee replacement), right 06/27/2019       Surgical History:   Donald Warren Abadie  has a past surgical history that includes Abscess drainage; Hand surgery (Left); Partial nephrectomy (Left, August 2014); Colonoscopy; Kidney surgery; Percutaneous cryotherapy of peripheral nerve using liquid nitrous oxide in closed needle device (Right, 6/17/2019); Total knee arthroplasty (Right, 6/27/2019); Joint replacement; and Endoscopic ultrasound of upper gastrointestinal tract (N/A, 6/11/2024).    Medications:   Kleber has a current medication list which includes the following prescription(s): allopurinol, cyanocobalamin, diazepam, diclofenac sodium, dronedarone, eliquis, folic acid, gabapentin, hydrocodone-acetaminophen, ketoconazole, magnesium oxide, metformin, metoprolol succinate, multivitamin, omega-3 acid ethyl esters, pantoprazole, pregabalin, ropinirole, rosuvastatin, sertraline, tamsulosin, and vitamin b-1.    Allergies:   Review of patient's allergies indicates:   Allergen Reactions    No known drug allergies         Objective      Observation: pt presents in gym. No distress noted. Pt is hard of hearing and does not have hearing aides today. Slurring speech. Residual bruising noted along anterior proximal shoulder    Posture: FHRS posture      Passive Range of Motion (*= pain):   Shoulder Right Left   Flexion 30* 155   Abduction 15* 90 "   ER at 0 5* 45   ER at 90 N/t N/t   IR 5* 40      Active Range of Motion(*= pain):   Shoulder Right Left   Flexion 10* 150   Abduction 0* 90   ER at 0 0* 40   ER at 90 N/t 85     Strength (*= pain):  Shoulder Right Left   Flexion 1 5   Abduction 1 5   ER 1 5   IR 1 5   Elbow flx 3 5   Elbow ext 3+ 5   *pain with all right shoulder testing    Special Tests:  Unable to perform special test at this time due to lack of ROM     Scapular Control/Dyskinesis:     Normal / Subtle / Obvious  Comments    Left  N/t    Right  N/t        Joint Mobility: guarded    Palpation: tenderness to lateral and posterior humeral head RUE    Proximal/distal screen:   Cervical: full AROM, negative spurling jesus    Sensation: pt reports decrease sensation soft tough throughout R hand      Limitation/Restriction for FOTO  Survey    Therapist reviewed FOTO scores for Donald Warren Abadie on 8/28/2024.   FOTO documents entered into EPIC - see Media section.    Limitation Score: see media         Treatment     Total Treatment time (time-based codes) separate from Evaluation: 20 minutes      Kleber received the treatments listed below:      therapeutic exercises to develop ROM for 20 minutes including:  Reviewed hep      Patient Education and Home Exercises     Education provided:   - reviewed hep, timelines    Written Home Exercises Provided: yes. Exercises were reviewed and Kleber was able to demonstrate them prior to the end of the session.  Kleber demonstrated good  understanding of the education provided. See EMR under Patient Instructions for exercises provided during therapy sessions.    Assessment     Kleber is a 68 y.o. male referred to outpatient Physical Therapy with a medical diagnosis ofM25.511 (ICD-10-CM) - Acute pain of right shoulder  . Patient presents with decreased ROM, pain, stiffness, weakness, decreaased functional mobility secondary to the above dx. Pt would benefit from skilled PT in order to maximize function.     Patient  prognosis is Guarded.   Patient will benefit from skilled outpatient Physical Therapy to address the deficits stated above and in the chart below, provide patient /family education, and to maximize patientt's level of independence.     Plan of care discussed with patient: Yes  Patient's spiritual, cultural and educational needs considered and patient is agreeable to the plan of care and goals as stated below:     Anticipated Barriers for therapy: DM, EOT    Medical Necessity is demonstrated by the following  History  Co-morbidities and personal factors that may impact the plan of care [] LOW: no personal factors / co-morbidities  [x] MODERATE: 1-2 personal factors / co-morbidities  [] HIGH: 3+ personal factors / co-morbidities     Moderate / High Support Documentation:   Co-morbidities affecting plan of care: NA     Personal Factors:   no deficits      Examination  Body Structures and Functions, activity limitations and participation restrictions that may impact the plan of care [x] LOW: addressing 1-2 elements  [] MODERATE: 3+ elements  [] HIGH: 4+ elements (please support below)     Moderate / High Support Documentation: NA      Clinical Presentation [x] LOW: stable  [] MODERATE: Evolving  [] HIGH: Unstable      Decision Making/ Complexity Score: low         Goals:  Short Term Goals: 8 weeks   Patient independent in initial hep  Full PROM  AROM 50% or better  Pt reports 20% improvement in function    Long Term Goals: 16-20 weeks   Pt independent in d/c hep  Full AROM  Strength 75% HHD compared to well side  Pt reports 75% improvement in function  Plan     Plan of care Certification: 8/28/2024 to 12/31/24.    Outpatient Physical Therapy 1-3 times weekly for 20 weeks to include the following interventions: Electrical Stimulation DN, Manual Therapy, Moist Heat/ Ice, Neuromuscular Re-ed, Patient Education, Self Care, Therapeutic Activities, and Therapeutic Exercise.     Murali Uriostegui, PT

## 2024-09-01 NOTE — PHYSICIAN QUERY
Please clarify the acute encephalopathy diagnosis based on the clinical findings.   Encephalopathy ruled out, disorientation 2/2 alcohol intoxication/Post reduction sedation   
other...

## 2024-09-03 ENCOUNTER — PATIENT MESSAGE (OUTPATIENT)
Dept: INTERNAL MEDICINE | Facility: CLINIC | Age: 69
End: 2024-09-03
Payer: MEDICARE

## 2024-09-04 ENCOUNTER — PATIENT MESSAGE (OUTPATIENT)
Dept: SPORTS MEDICINE | Facility: CLINIC | Age: 69
End: 2024-09-04
Payer: MEDICARE

## 2024-09-04 ENCOUNTER — TELEPHONE (OUTPATIENT)
Dept: NEUROLOGY | Facility: CLINIC | Age: 69
End: 2024-09-04
Payer: MEDICARE

## 2024-09-04 ENCOUNTER — OUTPATIENT CASE MANAGEMENT (OUTPATIENT)
Dept: ADMINISTRATIVE | Facility: OTHER | Age: 69
End: 2024-09-04
Payer: MEDICARE

## 2024-09-04 NOTE — PROGRESS NOTES
Outpatient Care Management  Plan of Care Follow Up Visit    Patient: Donald Warren Abadie  MRN: [id number]  Date of Service: 09/04/2024  Completed by: Tiana Parry RN  Referral Date: 08/05/2024    Reason for Visit   Patient presents with    Other     Phone call from daughter, Chani Gleason.       Brief Summary: CM received phone call from patient's daughter, Chani Gleason, to discuss patient's current situation.  Chani states the patient lives alone, his living conditions are deplorable, he is an alcoholic and does marijuana gummies.  She states that he is forgetful at times but is not sure if it's him or just the alcohol that is causing that.  She states he has fallen four times in the past month and  gets depressed too.  She wants him to move in with her in [place] (patient lives in [place]) but states she thinks he is hesitant because he doesn't want to live by rules where he cannot drink whenever he wants to because she has a child.  She did call [facility] and they told her all they can do is provide cleaning services for him.  I offered to reopen his OPCM case and she said she doesn't think that teaching will be helpful at this time because of all the drinking.  She thinks it might just make him irritated.  She states one time she went to his home after she worked four straight 12 hour shifts and there was stool all over the house from the dog and stool all over the bathroom from him.  TRE sent message to Yadira Franco LCSW, supervisor of OPCM SW, who states she will assign the case.  TRE instructed Chani to call TRE back if she needs anything or has any questions before DEENA contacts the patient.

## 2024-09-04 NOTE — TELEPHONE ENCOUNTER
Spoke with Chani informed that she did send paperwork to [employer]. Contacted [employer] and asked to resend paperwork so that I can print and present to Provider. Waiting on forms to be emailed and verified email information.

## 2024-09-06 ENCOUNTER — OUTPATIENT CASE MANAGEMENT (OUTPATIENT)
Dept: ADMINISTRATIVE | Facility: OTHER | Age: 69
End: 2024-09-06
Payer: MEDICARE

## 2024-09-06 ENCOUNTER — PATIENT MESSAGE (OUTPATIENT)
Dept: PAIN MEDICINE | Facility: CLINIC | Age: 69
End: 2024-09-06
Payer: MEDICARE

## 2024-09-06 NOTE — LETTER
Donald Abadie  [address] [id number]      09/10/2024      Dear Donald Abadie,     I am writing from the Outpatient Care Management Department at [facility]. I received a referral from your primary care physician to contact you regarding any needs you may have. I was unable to reach you by phone. Please contact me for assistance.     I can be reached at [phone] Monday thru Friday from 8:00am to 4:30pm.      Additionally, [facility] also has a program with a nurse available 24/7 to answer questions or provide medical advice. [facility] can be reached at [phone].      Sincerely,        Jeny Romero LCSW  Outpatient Care Management

## 2024-09-07 ENCOUNTER — HOSPITAL ENCOUNTER (OUTPATIENT)
Dept: RADIOLOGY | Facility: HOSPITAL | Age: 69
Discharge: HOME | End: 2024-09-07
Attending: INTERNAL MEDICINE
Payer: MEDICARE

## 2024-09-07 DIAGNOSIS — R41.3 OTHER AMNESIA: ICD-10-CM

## 2024-09-07 PROCEDURE — 70551 MRI BRAIN STEM W/O DYE: CPT | Mod: TC,HCNC

## 2024-09-07 PROCEDURE — 70551 MRI BRAIN STEM W/O DYE: CPT | Mod: 26,HCNC,, | Performed by: RADIOLOGY

## 2024-09-10 ENCOUNTER — PATIENT MESSAGE (OUTPATIENT)
Dept: REHABILITATION | Facility: HOSPITAL | Age: 69
End: 2024-09-10
Payer: MEDICARE

## 2024-09-12 ENCOUNTER — TELEPHONE (OUTPATIENT)
Dept: ORTHOPEDICS | Facility: CLINIC | Age: 69
End: 2024-09-12
Payer: MEDICARE

## 2024-09-12 NOTE — TELEPHONE ENCOUNTER
----- Message from Carly MARIE Route sent at 9/12/2024  2:39 PM CDT -----  Regarding: Ankle Fracture  Contact: Sherine [phone]  Pt's daughter Sherine is calling to schedule pt for a fractured ankle. Ankle was fractured on Tues 9/10 and pt was seen in the ER on today.9/12  Sherine is asking for pt to be seen in [place] if possible.  Patient Requesting Call Back @  Sherine [phone]

## 2024-09-13 ENCOUNTER — PATIENT MESSAGE (OUTPATIENT)
Dept: ADMINISTRATIVE | Facility: OTHER | Age: 69
End: 2024-09-13
Payer: MEDICARE

## 2024-09-13 ENCOUNTER — TELEPHONE (OUTPATIENT)
Dept: ORTHOPEDICS | Facility: CLINIC | Age: 69
End: 2024-09-13
Payer: MEDICARE

## 2024-09-13 DIAGNOSIS — M25.571 RIGHT ANKLE PAIN, UNSPECIFIED CHRONICITY: Primary | ICD-10-CM

## 2024-09-13 NOTE — PROGRESS NOTES
Outpatient Care Management   - Patient Assessment    Patient: Donald Warren Abadie  MRN:  [id number]  Date of Service:  9/12/2024  Completed by:  Jeny Romero LCSW  Referral Date: 08/05/2024    Reason for Visit   Patient presents with    Other     09/06/2024  Son-in-law answered and provided another number ([phone]).  09/06/2024  Left message at [phone]  09/10/2024  2nd attempt to complete Initial Assessment for Outpatient Care Management, left message. Attempt letter sent.    09/10/2024  Received return call from pt's daughter stating that pt is at his home and not answer the phone. She voiced that pt will be brought to her house tonight. LCSW informed pt's daughter that LCSW will call pt on tomorrow.  09/11/2024  Left message.    Social Work Assessment       Brief Summary:  received a referral from other for the following HR SW only psychosocial needs housing concerns as pt is suspected of living in deplorable conditions; alcoholism, depression. Pt's daughter would like for pt to reside with her.  The patient also requests assistance with n/a. LCSW reviewed pt's chart in preparation for call to pt. LCSW contacted pt via phone to complete social work assessment and SDOH assessment. LCSW spoke with pt, introduced self, and explained the purpose of the call. Pt voiced agreement with participating in program. Pt is a 69yo male who currently resides alone. Pt uses several mobility aids including a cane, rollator, and shower chair. Pt reports that he is independent with most ADLs and IADL, but does require the assistance of one person for bathing. Pt's daughter present during assessment and shared that pt returned home on Monday of this week, had a fall and fractured his foot. Pt's daughter shared that he also has a fractured arm from another fall. She also shared that pt's home is deplorable and has feces on the walls and in the bathroom. Pt attempted to minimize the condition  of his home, however, did acknowledge that it needs cleaning. Pt admits that he is an alcoholic and drinks a case of 24 beers per day. He shared that he has sought treatment in the past at [facility] and the program worked. Pt reports that he maintained sobriety for 5years, however, pt's daughter corrected him and stated he was sober for 2 years. LCSW inquired about pt's willingness to reenter rehab and pt in agreement. Pt's daughter voiced that pt has success with [facility], however, [place], it would be difficult for her to bring pt to the [facility] everyday for treatment. LCSW completed community search of IOPs [place] the [place] and provided pt and daughter with information for [facility] ([phone]). LCSW continued discussion with pt's daughter who described the condition of the home and pt's inability to care for himself. LCSW determined that a report of self-neglect should be made to [facility]. Pt's daughter in agreement and shared that she considered calling, however, decided against same as she did not want her father to become angry with her. LCSW voiced understanding and advised pt that LCSW is mandated to report self-neglect of elderly patients. Pt's daughter stated that pt can live with her. Care plan was created in collaboration with patient/caregiver input.

## 2024-09-13 NOTE — TELEPHONE ENCOUNTER
----- Message from Karla Mendez LPN sent at 9/12/2024  4:16 PM CDT -----  Regarding: FW: PT'S DAUGHTER IS RETURNING A CALL FRMARIE SAMUELS  Contact: Chani Israel Patient Appt  ----- Message -----  From: Klarissa Staples MA  Sent: 9/12/2024   4:05 PM CDT  To: Randy Bell Staff  Subject: FW: PT'S DAUGHTER IS RETURNING A CALL FRM CI#    This pt wants to be seen on the [facility], can someone get him scheduled for fx right ankle. I tried scheduling here at [facility] but they prefer [facility].  ----- Message -----  From: Gadiel Escalante  Sent: 9/12/2024   3:48 PM CDT  To: Jayda Valerio Staff  Subject: PT'S DAUGHTER IS RETURNING A CALL FRM KLARISSA      Pt's daughter is requesting a call back..     Confirmed contact info below:  Contact Name: Donald Abadie  Phone Number: [phone]

## 2024-09-17 ENCOUNTER — HOSPITAL ENCOUNTER (OUTPATIENT)
Dept: RADIOLOGY | Facility: HOSPITAL | Age: 69
Discharge: HOME | End: 2024-09-17
Attending: ORTHOPAEDIC SURGERY
Payer: MEDICARE

## 2024-09-17 ENCOUNTER — OFFICE VISIT (OUTPATIENT)
Dept: ORTHOPEDICS | Facility: CLINIC | Age: 69
End: 2024-09-17
Payer: MEDICARE

## 2024-09-17 DIAGNOSIS — S82.844A: Primary | ICD-10-CM

## 2024-09-17 DIAGNOSIS — M25.571 RIGHT ANKLE PAIN, UNSPECIFIED CHRONICITY: ICD-10-CM

## 2024-09-17 PROCEDURE — 73610 X-RAY EXAM OF ANKLE: CPT | Mod: TC,HCNC,PO,RT

## 2024-09-17 PROCEDURE — 1100F PTFALLS ASSESS-DOCD GE2>/YR: CPT | Mod: HCNC,CPTII,S$GLB, | Performed by: ORTHOPAEDIC SURGERY

## 2024-09-17 PROCEDURE — 3044F HG A1C LEVEL LT 7.0%: CPT | Mod: HCNC,CPTII,S$GLB, | Performed by: ORTHOPAEDIC SURGERY

## 2024-09-17 PROCEDURE — 1159F MED LIST DOCD IN RCRD: CPT | Mod: HCNC,CPTII,S$GLB, | Performed by: ORTHOPAEDIC SURGERY

## 2024-09-17 PROCEDURE — 99999 PR PBB SHADOW E&M-EST. PATIENT-LVL III: CPT | Mod: PBBFAC,HCNC,, | Performed by: ORTHOPAEDIC SURGERY

## 2024-09-17 PROCEDURE — 1125F AMNT PAIN NOTED PAIN PRSNT: CPT | Mod: HCNC,CPTII,S$GLB, | Performed by: ORTHOPAEDIC SURGERY

## 2024-09-17 PROCEDURE — 3288F FALL RISK ASSESSMENT DOCD: CPT | Mod: HCNC,CPTII,S$GLB, | Performed by: ORTHOPAEDIC SURGERY

## 2024-09-17 PROCEDURE — 27808 TREATMENT OF ANKLE FRACTURE: CPT | Mod: HCNC,RT,S$GLB, | Performed by: ORTHOPAEDIC SURGERY

## 2024-09-17 PROCEDURE — 99214 OFFICE O/P EST MOD 30 MIN: CPT | Mod: HCNC,57,S$GLB, | Performed by: ORTHOPAEDIC SURGERY

## 2024-09-17 PROCEDURE — 1160F RVW MEDS BY RX/DR IN RCRD: CPT | Mod: HCNC,CPTII,S$GLB, | Performed by: ORTHOPAEDIC SURGERY

## 2024-09-17 PROCEDURE — 73610 X-RAY EXAM OF ANKLE: CPT | Mod: 26,HCNC,RT, | Performed by: RADIOLOGY

## 2024-09-18 ENCOUNTER — TELEPHONE (OUTPATIENT)
Dept: INTERNAL MEDICINE | Facility: CLINIC | Age: 69
End: 2024-09-18
Payer: MEDICARE

## 2024-09-18 ENCOUNTER — OUTPATIENT CASE MANAGEMENT (OUTPATIENT)
Dept: ADMINISTRATIVE | Facility: OTHER | Age: 69
End: 2024-09-18
Payer: MEDICARE

## 2024-09-18 ENCOUNTER — PATIENT MESSAGE (OUTPATIENT)
Dept: ADMINISTRATIVE | Facility: OTHER | Age: 69
End: 2024-09-18
Payer: MEDICARE

## 2024-09-18 NOTE — TELEPHONE ENCOUNTER
----- Message from Corina Kwon sent at 9/18/2024 11:44 AM CDT -----  Contact: [phone] Patient  Pts daughter is calling in regards to [place] paperwork and a follow up appointment. Pt recently broke his foot and asking if Dr Larry wants to follow up with him since he just saw DR Larry 08/27/2024. Please call and advise. Thank you

## 2024-09-19 ENCOUNTER — TELEPHONE (OUTPATIENT)
Dept: INTERNAL MEDICINE | Facility: CLINIC | Age: 69
End: 2024-09-19
Payer: MEDICARE

## 2024-09-19 ENCOUNTER — OUTPATIENT CASE MANAGEMENT (OUTPATIENT)
Dept: ADMINISTRATIVE | Facility: OTHER | Age: 69
End: 2024-09-19
Payer: MEDICARE

## 2024-09-19 NOTE — PROGRESS NOTES
LCSW made report of self-neglect and deplorable living conditions with [facility] [employer] on 9/18/2024. Received return call this morning from Cate ([phone]). LCSW provided additional details provided to LCSW by pt and daughter which include: pt having a fall in the home with son-in-law finding him with feces on himself and on the walls in the home; pt's home cluttered and dirty; and pt actively drinking excessively including binge drinking which causes him to become angry and aggressive. LCSW informed Cate that pt's daughter was fearful of making a report as she feared that pt would become angry and aggressive towards her. At that time, LCSW advised pt's daughter that LCSW is mandated to make a report. LCSW also informed Cate that pt is currently in [place] with daughter in her home. LCSW shared that pt has a neuropsychology appointment on tomorrow and pt is expected to return home following the appointment. Cate voiced appreciation for the additional information and stated that she will wait until after pt's appointment to visit. LCSW offered to contact her on tomorrow to let her know where pt is located as LCSW will be making a follow-up call to pt and daughter. [facility]. Will follow.

## 2024-09-19 NOTE — PROGRESS NOTES
"Status/Diagnosis: Nondisplaced Right bimalleolar ankle fracture.  Date of Surgery: none  Date of Injury: 09/10/2024  Return visit: 5 weeks  X-rays on Return: WB 3-views Right ankle    Chief Complaint:   Chief Complaint   Patient presents with    Right Ankle - Pain, Injury, Swelling     Present History:  90-DAY GLOBAL  Patient presents today via referral from [facility]   Donald Warren Abadie is a 68 y.o. male who presents today with his daughter for new patient evaluation.  Acute onset right ankle pain after twisting his ankle on 09/10/2024.  Immediate pain and swelling with difficulty bearing weight.  Subsequently seen in the emergency department at which time x-rays were taken and consistent with fracture.  Patient was given a boot however he self discontinued this.  Currently he is full weight-bearing in normal shoe wear.    3/10 pain.  Denies any numbness or tingling.    Past medical history significant for AFib on Eliquis; gout; renal cell cancer.      Past Medical History:   Diagnosis Date    A-fib     Alcohol abuse     Anxiety     Arthritis     Chronic gout     Diabetes mellitus     DM (diabetes mellitus) 11/16/2016    "boarderline, not taking any meds currently"    Fatty liver     Hyperlipidemia     Renal cell cancer 2014    S/P TKR (total knee replacement), right 06/27/2019       Past Surgical History:   Procedure Laterality Date    ABSCESS DRAINAGE      perirectal    COLONOSCOPY      ENDOSCOPIC ULTRASOUND OF UPPER GASTROINTESTINAL TRACT N/A 6/11/2024    Procedure: ULTRASOUND, UPPER GI TRACT, ENDOSCOPIC;  Surgeon: Mode Wood MD;  Location: [facility];  Service: Endoscopy;  Laterality: N/A;  6/7 portal-EUS in 7-10 days please, [facility] or [place]-eliquis approved  Te 6/7-tt  6/10-precall complete-MS    HAND SURGERY Left     JOINT REPLACEMENT      knee replacement right knee    KIDNEY SURGERY      partial left kidney removal - [place]    PARTIAL NEPHRECTOMY Left August 2014    PERCUTANEOUS CRYOTHERAPY " OF PERIPHERAL NERVE USING LIQUID NITROUS OXIDE IN CLOSED NEEDLE DEVICE Right 6/17/2019    Procedure: CRYOTHERAPY, NERVE, PERIPHERAL, PERCUTANEOUS, USING LIQUID NITROUS OXIDE IN CLOSED NEEDLE DEVICE-right knee iovera;  Surgeon: Donny Hair III, MD;  Location: [facility] CATH LAB;  Service: Pain Management;  Laterality: Right;    TOTAL KNEE ARTHROPLASTY Right 6/27/2019    Procedure: ARTHROPLASTY, KNEE, TOTAL-SAME DAY;  Surgeon: Mikael Huerta MD;  Location: [facility] OR [address] [place] [address];  Service: Orthopedics;  Laterality: Right;       Current Outpatient Medications   Medication Sig    allopurinoL (ZYLOPRIM) 100 MG tablet TAKE 2 TABLETS(200 MG) BY MOUTH EVERY DAY    cyanocobalamin (VITAMIN B-12) 1000 MCG tablet Take 1 tablet (1,000 mcg total) by mouth once daily.    diclofenac sodium (VOLTAREN) 1 % Gel Apply 2 g topically 3 (three) times daily as needed (Right knee - hand pain).    dronedarone (MULTAQ) 400 mg Tab Take 1 tablet (400 mg total) by mouth 2 (two) times daily with meals. (Patient taking differently: Take 400 mg by mouth once daily.)    ELIQUIS 5 mg Tab TAKE 1 TABLET BY MOUTH TWICE DAILY (Patient taking differently: Take 5 mg by mouth 2 (two) times daily.)    gabapentin (NEURONTIN) 300 MG capsule TAKE 1 CAPSULE(300 MG) BY MOUTH TWICE DAILY    HYDROcodone-acetaminophen (NORCO) 5-325 mg per tablet Take 1 tablet by mouth 2 (two) times daily as needed for Pain.    ketoconazole (NIZORAL) 2 % cream Apply topically once daily. Apply to affected skin from toes to heels twice a day for 3-4 weeks.    magnesium oxide (MAG-OX) 400 mg (241.3 mg magnesium) tablet Take 1 tablet (400 mg total) by mouth once daily.    metFORMIN (GLUCOPHAGE-XR) 500 MG ER 24hr tablet Take 2 tablets (1,000 mg total) by mouth 2 (two) times daily with meals. (Patient taking differently: Take 250 mg by mouth 2 (two) times daily with meals.)    metoprolol succinate (TOPROL-XL) 25 MG 24 hr tablet Take 1 tablet (25 mg total) by mouth once daily.     multivitamin (THERAGRAN) tablet Take 1 tablet by mouth once daily.    omega-3 acid ethyl esters (LOVAZA) 1 gram capsule Take 2 g by mouth 2 (two) times daily.    pantoprazole (PROTONIX) 40 MG tablet Take 1 tablet (40 mg total) by mouth once daily.    pregabalin (LYRICA) 75 MG capsule Take 1 capsule (75 mg total) by mouth 2 (two) times daily.    rOPINIRole (REQUIP) 1 MG tablet TAKE 1 TABLET(1 MG) BY MOUTH EVERY EVENING (Patient taking differently: Take 1 tablet by mouth daily as needed (restless leg).)    rosuvastatin (CRESTOR) 20 MG tablet TAKE 1 TABLET(20 MG) BY MOUTH EVERY DAY    sertraline (ZOLOFT) 100 MG tablet Take 1 tablet (100 mg total) by mouth once daily.    tamsulosin (FLOMAX) 0.4 mg Cap TAKE 1 CAPSULE(0.4 MG) BY MOUTH EVERY DAY (Patient taking differently: Take 0.4 mg by mouth every evening.)    VITAMIN B-1 100 MG tablet TAKE 1 TABLET BY MOUTH ONCE DAILY    diazePAM (VALIUM) 5 MG tablet Take 2 tablets (10 mg total) by mouth every 8 (eight) hours for 1 day, THEN 2 tablets (10 mg total) every 12 (twelve) hours for 1 day, THEN 1 tablet (5 mg total) every 12 (twelve) hours for 1 day. (Patient not taking: Reported on 8/8/2024)    folic acid (FOLVITE) 1 MG tablet Take 1 tablet (1 mg total) by mouth once daily.     No current facility-administered medications for this visit.       Review of patient's allergies indicates:   Allergen Reactions    No known drug allergies        Family History   Problem Relation Name Age of Onset    Lung cancer Father      Colon cancer Father      Cancer Father          lung cancer    Diabetes Mother      Alzheimer's disease Mother      Lung cancer Sister      Cancer Sister          lung    Colon polyps Brother      Alcohol abuse Brother      Alzheimer's disease Brother      Cirrhosis Neg Hx         Social History     Socioeconomic History    Marital status: Single   Occupational History     Employer: [employer]   Tobacco Use    Smoking status: Never    Smokeless tobacco:  Never   Substance and Sexual Activity    Alcohol use: Yes     Alcohol/week: 168.0 standard drinks of alcohol     Types: 168 Cans of beer per week     Comment: 12-24  beers daily    Drug use: No    Sexual activity: Not Currently     Partners: Female     Social Determinants of Health     Financial Resource Strain: Low Risk  (8/6/2024)    Overall Financial Resource Strain (CARDIA)     Difficulty of Paying Living Expenses: Not hard at all   Food Insecurity: No Food Insecurity (8/6/2024)    Hunger Vital Sign     Worried About Running Out of Food in the Last Year: Never true     Ran Out of Food in the Last Year: Never true   Transportation Needs: No Transportation Needs (8/6/2024)    PRAPARE - Transportation     Lack of Transportation (Medical): No     Lack of Transportation (Non-Medical): No   Physical Activity: Sufficiently Active (8/6/2024)    Exercise Vital Sign     Days of Exercise per Week: 5 days     Minutes of Exercise per Session: 60 min   Recent Concern: Physical Activity - Inactive (8/5/2024)    Exercise Vital Sign     Days of Exercise per Week: 0 days     Minutes of Exercise per Session: 0 min   Stress: No Stress Concern Present (8/6/2024)    [employer] - Occupational Stress Questionnaire     Feeling of Stress : Only a little   Housing Stability: Low Risk  (8/6/2024)    Housing Stability Vital Sign     Unable to Pay for Housing in the Last Year: No     Homeless in the Last Year: No       Physical exam:  There were no vitals filed for this visit.  There is no height or weight on file to calculate BMI.  General: In no apparent distress; well developed and well nourished.  HEENT: normocephalic; atraumatic.  Cardiovascular: regular rate.  Respiratory: no increased work of breathing.  Musculoskeletal:   Gait: nonantalgic  Inspection:   Moderate residual swelling about the ankle diffusely.  Tenderness on deep palpation both of the lateral malleolus and at the tip of the medial  malleolus.  No pain referable to the foot.  No laxity with anterior drawer or with varus/valgus talar tilt testing.  Silfverskiold: Negative  Alignment:  Knee: neutral               Ankle: neutral              Hindfoot: neutral              Forefoot: neutral   Strength:              Dorsiflexion 5/5  Plantar flexion 5/5  Inversion 5/5  Eversion 5/5  Sensation:              SILT distally  ROM:              Ankle: near full with pain at extremes              Subtalar: near full with pain at extremes  Pulses: Palpable pedal pulse                   Imaging Studies/Outside documentation:  I have ordered/reviewed/interpreted the following images/outside documentation:  1. Weight-bearing 3-views of Right ankle:   On my independent review, acute nondisplaced long spiral fracture of the lateral malleolus at the level of the syndesmosis.  Nondisplaced avulsion fracture involving the tip of the medial malleolus.  Ankle mortise remains congruent on weight-bearing stress x-rays.  Moderate tibiotalar joint space narrowing with concurrent osteophyte formation, most prominent over the anterior margin of the distal tibial plafond.        Assessment:  Donald Warren Abadie is a 68 y.o. male with Nondisplaced Right bimalleolar ankle fracture.     Plan:   90-DAY GLOBAL  Clinical and radiographic findings were discussed.  Operative versus nonoperative options were described.    Fracture appears to be stable on weight-bearing stress x-rays today.  We will plan to continue with conservative management at this time.    Recommend patient wear his tall cam boot at ALL times while weight-bearing.  Also provided an even up shoe lift to help with gait.    Also reinforced the importance of an assistive ambulatory device.    Discussed rest, ice, compression, elevation, over-the-counter oral Tylenol as needed for pain.    Patient voiced understanding.  All questions were answered.  Return to clinic in 5 weeks for repeat evaluation and x-ray.   Possible transition out of the boot at that time.    We performed a custom orthotic/brace fitting, adjusting and training with the patient. The patient demonstrated understanding and proper care. This was performed for 15 minutes.    This note was created using voice recognition software and may contain grammatical errors.

## 2024-09-20 ENCOUNTER — OUTPATIENT CASE MANAGEMENT (OUTPATIENT)
Dept: ADMINISTRATIVE | Facility: OTHER | Age: 69
End: 2024-09-20
Payer: MEDICARE

## 2024-09-20 ENCOUNTER — PATIENT MESSAGE (OUTPATIENT)
Dept: ADMINISTRATIVE | Facility: OTHER | Age: 69
End: 2024-09-20
Payer: MEDICARE

## 2024-09-20 ENCOUNTER — HOSPITAL ENCOUNTER (EMERGENCY)
Facility: HOSPITAL | Age: 69
Discharge: HOME | End: 2024-09-20
Attending: EMERGENCY MEDICINE
Payer: MEDICARE

## 2024-09-20 ENCOUNTER — TELEPHONE (OUTPATIENT)
Dept: NEUROSURGERY | Facility: CLINIC | Age: 69
End: 2024-09-20
Payer: MEDICARE

## 2024-09-20 VITALS
HEIGHT: 66 IN | WEIGHT: 180 LBS | TEMPERATURE: 98 F | DIASTOLIC BLOOD PRESSURE: 62 MMHG | OXYGEN SATURATION: 98 % | RESPIRATION RATE: 20 BRPM | SYSTOLIC BLOOD PRESSURE: 121 MMHG | HEART RATE: 59 BPM | BODY MASS INDEX: 28.93 KG/M2

## 2024-09-20 DIAGNOSIS — S06.5XAA: Primary | ICD-10-CM

## 2024-09-20 DIAGNOSIS — S09.90XA INJURY OF HEAD, INITIAL ENCOUNTER: ICD-10-CM

## 2024-09-20 DIAGNOSIS — S01.01XA LACERATION OF SCALP, INITIAL ENCOUNTER: ICD-10-CM
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
## 2024-09-20 DIAGNOSIS — I62.00 NONTRAUMATIC SUBDURAL HEMORRHAGE, UNSPECIFIED: Primary | ICD-10-CM

## 2024-09-20 LAB
ABO + RH BLD: NORMAL
ALBUMIN SERPL BCP-MCNC: 3.9 G/DL (ref 3.5–5.2)
ALP SERPL-CCNC: 68 U/L (ref 55–135)
ALT SERPL W/O P-5'-P-CCNC: 19 U/L (ref 10–44)
ANION GAP SERPL CALC-SCNC: 12 MMOL/L (ref 8–16)
APTT PPP: 32.3 SEC (ref 21–32)
AST SERPL-CCNC: 34 U/L (ref 10–40)
BASOPHILS # BLD AUTO: 0.06 K/UL (ref 0–0.2)
BASOPHILS NFR BLD: 0.8 % (ref 0–1.9)
BILIRUB SERPL-MCNC: 0.7 MG/DL (ref 0.1–1)
BLD GP AB SCN CELLS X3 SERPL QL: NORMAL
BUN SERPL-MCNC: 8 MG/DL (ref 8–23)
CALCIUM SERPL-MCNC: 9.8 MG/DL (ref 8.7–10.5)
CHLORIDE SERPL-SCNC: 100 MMOL/L (ref 95–110)
CO2 SERPL-SCNC: 21 MMOL/L (ref 23–29)
CREAT SERPL-MCNC: 1 MG/DL (ref 0.5–1.4)
DIFFERENTIAL METHOD BLD: ABNORMAL
EOSINOPHIL # BLD AUTO: 0.1 K/UL (ref 0–0.5)
EOSINOPHIL NFR BLD: 0.8 % (ref 0–8)
ERYTHROCYTE [DISTWIDTH] IN BLOOD BY AUTOMATED COUNT: 14 % (ref 11.5–14.5)
EST. GFR  (NO RACE VARIABLE): >60 ML/MIN/1.73 M^2
GLUCOSE SERPL-MCNC: 95 MG/DL (ref 70–110)
HCT VFR BLD AUTO: 38.6 % (ref 40–54)
HGB BLD-MCNC: 12.8 G/DL (ref 14–18)
IMM GRANULOCYTES # BLD AUTO: 0.06 K/UL (ref 0–0.04)
IMM GRANULOCYTES NFR BLD AUTO: 0.8 % (ref 0–0.5)
INR PPP: 1.1 (ref 0.8–1.2)
LYMPHOCYTES # BLD AUTO: 1.4 K/UL (ref 1–4.8)
LYMPHOCYTES NFR BLD: 18.4 % (ref 18–48)
MCH RBC QN AUTO: 32.2 PG (ref 27–31)
MCHC RBC AUTO-ENTMCNC: 33.2 G/DL (ref 32–36)
MCV RBC AUTO: 97 FL (ref 82–98)
MONOCYTES # BLD AUTO: 0.9 K/UL (ref 0.3–1)
MONOCYTES NFR BLD: 11.7 % (ref 4–15)
NEUTROPHILS # BLD AUTO: 5.2 K/UL (ref 1.8–7.7)
NEUTROPHILS NFR BLD: 67.5 % (ref 38–73)
NRBC BLD-RTO: 0 /100 WBC
OHS QRS DURATION: 94 MS
OHS QTC CALCULATION: 472 MS
PLATELET # BLD AUTO: 190 K/UL (ref 150–450)
PMV BLD AUTO: 9.1 FL (ref 9.2–12.9)
POCT GLUCOSE: 110 MG/DL (ref 70–110)
POCT GLUCOSE: 125 MG/DL (ref 70–110)
POTASSIUM SERPL-SCNC: 4 MMOL/L (ref 3.5–5.1)
PROT SERPL-MCNC: 7.1 G/DL (ref 6–8.4)
PROTHROMBIN TIME: 12.2 SEC (ref 9–12.5)
RBC # BLD AUTO: 3.97 M/UL (ref 4.6–6.2)
SODIUM SERPL-SCNC: 133 MMOL/L (ref 136–145)
SPECIMEN OUTDATE: NORMAL
WBC # BLD AUTO: 7.68 K/UL (ref 3.9–12.7)

## 2024-09-20 PROCEDURE — 85025 COMPLETE CBC W/AUTO DIFF WBC: CPT | Mod: HCNC

## 2024-09-20 PROCEDURE — 86901 BLOOD TYPING SEROLOGIC RH(D): CPT | Mod: HCNC

## 2024-09-20 PROCEDURE — 12001 RPR S/N/AX/GEN/TRNK 2.5CM/<: CPT | Mod: HCNC

## 2024-09-20 PROCEDURE — 86900 BLOOD TYPING SEROLOGIC ABO: CPT | Mod: HCNC

## 2024-09-20 PROCEDURE — 93005 ELECTROCARDIOGRAM TRACING: CPT | Mod: HCNC

## 2024-09-20 PROCEDURE — 80053 COMPREHEN METABOLIC PANEL: CPT | Mod: HCNC | Performed by: EMERGENCY MEDICINE

## 2024-09-20 PROCEDURE — 99285 EMERGENCY DEPT VISIT HI MDM: CPT | Mod: 25,HCNC

## 2024-09-20 PROCEDURE — 85610 PROTHROMBIN TIME: CPT | Mod: HCNC | Performed by: EMERGENCY MEDICINE

## 2024-09-20 PROCEDURE — 93010 ELECTROCARDIOGRAM REPORT: CPT | Mod: HCNC,,, | Performed by: INTERNAL MEDICINE

## 2024-09-20 PROCEDURE — 86850 RBC ANTIBODY SCREEN: CPT | Mod: HCNC

## 2024-09-20 PROCEDURE — 25000003 PHARM REV CODE 250: Mod: HCNC

## 2024-09-20 PROCEDURE — 85730 THROMBOPLASTIN TIME PARTIAL: CPT | Mod: HCNC | Performed by: EMERGENCY MEDICINE

## 2024-09-20 PROCEDURE — 82962 GLUCOSE BLOOD TEST: CPT | Mod: HCNC

## 2024-09-20 RX ORDER — ACETAMINOPHEN 500 MG
1000 TABLET ORAL
Status: COMPLETED | OUTPATIENT
Start: 2024-09-20 | End: 2024-09-20

## 2024-09-20 RX ORDER — LIDOCAINE HYDROCHLORIDE 10 MG/ML
5 INJECTION, SOLUTION EPIDURAL; INFILTRATION; INTRACAUDAL; PERINEURAL
Status: COMPLETED | OUTPATIENT
Start: 2024-09-20 | End: 2024-09-20

## 2024-09-20 RX ADMIN — ACETAMINOPHEN 1000 MG: 500 TABLET ORAL at 08:09

## 2024-09-20 RX ADMIN — LIDOCAINE HYDROCHLORIDE 50 MG: 10 SOLUTION INTRAVENOUS at 11:09

## 2024-09-20 NOTE — ED NOTES
Nurses Note -- 4 Eyes      9/20/2024   10:49 AM      Skin assessed during: Q Shift Change      [] No Altered Skin Integrity Present    []Prevention Measures Documented      [x] Yes- Altered Skin Integrity Present or Discovered   [x] LDA Added if Not in Epic (Describe Wound)   [x] New Altered Skin Integrity was Present on Admit and Documented in LDA   [x] Wound Image Taken    Wound Care Consulted? No    Attending Nurse:  Blue Hicks RN/Staff Member:   Dr. June MD

## 2024-09-20 NOTE — DISCHARGE INSTRUCTIONS
You presented to the emergency department after falling out of her chair.  You are found to have a scalp laceration that was repaired with staples.  Your imaging showed a small bleed.      Therefore your recommended to hold home Eliquis for 1 week and follow-up outpatient with neurosurgery clinic for a repeat CT head without contrast prior to resuming Eliquis.  Please be sure to take your home metoprolol to control your Afib.    Please return back to the emergency department if he begins to experience visual changes, worsening headache, nausea, and vomiting.

## 2024-09-20 NOTE — ASSESSMENT & PLAN NOTE
Mr Abadie is a 68M w/ hx EtOH use, a fib on eliquis, DM, RCC who presents after fall on his way to neuro psych appointment today. Patient has been experiencing some increased memory difficulty, per family at bedside unclear if due to alcoholism vs dementia, so was going for neuropsych appt. Unfortunately on way there had mechanical fall and hit head, went to ED for CTH that shows very thin left tentorial acute SDH. Patient currently has a headache, denies any other neuro sx including weakness, numbness, seizures. He does also have recent right arm and distal right leg fractures, right arm mobility is currently pain limited. He takes eliquis for a fib, denies other blood thinners.     CTH 9/20: thin left tentorial acute SDH, no fx  CT C spine 9/20: multilevel degeneratie changes with kyphotic alignment and grade 1 C4-5 anterolisthesis, no acute fractures     - interval CTH ordered   - SBP <160  - coags wnl  - hold Eliquis 7 to 10 days  - if interval CTH stable, can discharge home, will arrange clinic follow up with [facility] with repeat CTH in 1 week    Discussed with staff Dr. Moreno

## 2024-09-20 NOTE — HPI
Mr Abadie is a 68M w/ hx EtOH use, a fib on eliquis, DM, RCC who presents after fall on his way to neuro psych appointment today. Patient has been experiencing some increased memory difficulty, per family at bedside unclear if due to alcoholism vs dementia, so was going for neuropsych appt. Unfortunately on way there had mechanical fall and hit head, went to ED for CTH that shows very thin left tentorial acute SDH. Patient currently has a headache, denies any other neuro sx including weakness, numbness, seizures. He does also have recent right arm and distal right leg fractures, right arm mobility is currently pain limited. He takes eliquis for a fib, denies other blood thinners.

## 2024-09-20 NOTE — ED PROVIDER NOTES
"Encounter Date: 9/20/2024       History     Chief Complaint   Patient presents with    Fall     Pt was here for an appointment. Pt tripped on walker fell and struck the back of his head. Pt on blood thinners. Pt reports headache. Pt has recent fractures to right arm and right leg. Pt has lac to the back of the head with swelling. Bleeding controlled.     68-year-old with past medical history of AFib (on Eliquis), alcohol use disorder, anxiety, CHF, DM type II, with a recent right ankle and arm fracture who presents to the emergency department after GLF.  Per daughter at bedside, patient was on the way to neuropsych appointment for evaluation of dementia when he fell out of his wheelchair.  Daughter reports that they were going over a bump when the chair folded and patient flew out and hit the back of his head.  Report right upper extremity pain that is chronic 2/2 fracture and denies change in intensity or new injury s/p GLF today.  Daughter reports patient consumed an alcohol beverage prior to appointment.  Patient complains of headache but denies loss of consciousness, visual changes, nausea, and vomiting.  He denies shortness of breath, chest pain, dizziness, lightheadedness, and abdominal pain.    The history is provided by the patient, medical records and a relative. No  was used.     Review of patient's allergies indicates:   Allergen Reactions    No known drug allergies      Past Medical History:   Diagnosis Date    A-fib     Alcohol abuse     Anxiety     Arthritis     Chronic gout     Diabetes mellitus     DM (diabetes mellitus) 11/16/2016    "boarderline, not taking any meds currently"    Fatty liver     Hyperlipidemia     Renal cell cancer 2014    S/P TKR (total knee replacement), right 06/27/2019     Past Surgical History:   Procedure Laterality Date    ABSCESS DRAINAGE      perirectal    COLONOSCOPY      ENDOSCOPIC ULTRASOUND OF UPPER GASTROINTESTINAL TRACT N/A 6/11/2024    Procedure: " ULTRASOUND, UPPER GI TRACT, ENDOSCOPIC;  Surgeon: Mode Wood MD;  Location: [facility] ENDO;  Service: Endoscopy;  Laterality: N/A;  6/7 portal-EUS in 7-10 days please, [facility] or Nicole-eliquis approved  Te 6/7-tt  6/10-precall complete-MS    HAND SURGERY Left     JOINT REPLACEMENT      knee replacement right knee    KIDNEY SURGERY      partial left kidney removal - [place]    PARTIAL NEPHRECTOMY Left August 2014    PERCUTANEOUS CRYOTHERAPY OF PERIPHERAL NERVE USING LIQUID NITROUS OXIDE IN CLOSED NEEDLE DEVICE Right 6/17/2019    Procedure: CRYOTHERAPY, NERVE, PERIPHERAL, PERCUTANEOUS, USING LIQUID NITROUS OXIDE IN CLOSED NEEDLE DEVICE-right knee iovera;  Surgeon: Donny Hair III, MD;  Location: [facility] CATH LAB;  Service: Pain Management;  Laterality: Right;    TOTAL KNEE ARTHROPLASTY Right 6/27/2019    Procedure: ARTHROPLASTY, KNEE, TOTAL-SAME DAY;  Surgeon: Mikael Huerta MD;  Location: [facility] OR [address] [place] [address];  Service: Orthopedics;  Laterality: Right;     Family History   Problem Relation Name Age of Onset    Lung cancer Father      Colon cancer Father      Cancer Father          lung cancer    Diabetes Mother      Alzheimer's disease Mother      Lung cancer Sister      Cancer Sister          lung    Colon polyps Brother      Alcohol abuse Brother      Alzheimer's disease Brother      Cirrhosis Neg Hx       Social History     Tobacco Use    Smoking status: Never    Smokeless tobacco: Never   Substance Use Topics    Alcohol use: Yes     Alcohol/week: 168.0 standard drinks of alcohol     Types: 168 Cans of beer per week     Comment: 12-24  beers daily    Drug use: No         Physical Exam     Initial Vitals [09/20/24 0801]   BP Pulse Resp Temp SpO2   136/74 60 20 98.3 °F (36.8 °C) 99 %      MAP       --         Physical Exam    Nursing note and vitals reviewed.  Constitutional: He appears well-developed and well-nourished.   HENT:   Head: Head is with laceration. Head is without raccoon's eyes and without  Whitfield's sign. Head contusion: Occipital region.      Laceration    Eyes: Pupils are equal, round, and reactive to light. Right eye exhibits no discharge. Left eye exhibits no discharge.   Neck: Neck supple. No tracheal deviation present.   Cardiovascular:  Normal rate and regular rhythm.           Pulmonary/Chest: Breath sounds normal. No respiratory distress. He exhibits no tenderness.   Abdominal: Abdomen is soft. There is no abdominal tenderness.   Musculoskeletal:      Right shoulder: No bony tenderness.      Left shoulder: No bony tenderness.      Right elbow: No lacerations.      Left elbow: Normal range of motion. No tenderness (2 mm).      Cervical back: Neck supple. No spinous process tenderness or muscular tenderness. Normal range of motion.      Right knee: No lacerations. No tenderness.      Left knee: No lacerations. No tenderness.      Comments: Superficial skin tear to left elbow      Neurological: He is alert and oriented to person, place, and time. GCS score is 15. GCS eye subscore is 4. GCS verbal subscore is 5. GCS motor subscore is 6.   Psychiatric: He has a normal mood and affect. Thought content normal.         ED Course   Lac Repair    Date/Time: 9/20/2024 12:48 PM    Performed by: Lenora Cerrato MD  Authorized by: Derek Watkins MD    Consent:     Consent obtained:  Verbal    Consent given by:  Patient    Risks, benefits, and alternatives were discussed: yes      Risks discussed:  Infection and pain    Alternatives discussed:  No treatment  Universal protocol:     Procedure explained and questions answered to patient or proxy's satisfaction: yes      Imaging studies available: yes      Patient identity confirmed:  Verbally with patient  Anesthesia:     Anesthesia method:  Local infiltration    Local anesthetic:  Lidocaine 1% w/o epi  Laceration details:     Location:  Scalp    Scalp location:  Occipital    Length (cm):  2.2    Depth (mm):  2  Exploration:     Limited defect created  (wound extended): no      Contaminated: no    Treatment:     Area cleansed with:  Saline    Amount of cleaning:  Standard    Irrigation solution:  Sterile saline    Irrigation method:  Syringe    Visualized foreign bodies/material removed: no      Debridement:  None  Skin repair:     Repair method:  Staples    Number of staples:  4  Approximation:     Approximation:  Close  Repair type:     Repair type:  Simple  Post-procedure details:     Dressing:  Open (no dressing)    Procedure completion:  Tolerated well, no immediate complications    Labs Reviewed   CBC W/ AUTO DIFFERENTIAL - Abnormal       Result Value    WBC 7.68      RBC 3.97 (*)     Hemoglobin 12.8 (*)     Hematocrit 38.6 (*)     MCV 97      MCH 32.2 (*)     MCHC 33.2      RDW 14.0      Platelets 190      MPV 9.1 (*)     Immature Granulocytes 0.8 (*)     Gran # (ANC) 5.2      Immature Grans (Abs) 0.06 (*)     Lymph # 1.4      Mono # 0.9      Eos # 0.1      Baso # 0.06      nRBC 0      Gran % 67.5      Lymph % 18.4      Mono % 11.7      Eosinophil % 0.8      Basophil % 0.8      Differential Method Automated     APTT - Abnormal    aPTT 32.3 (*)    COMPREHENSIVE METABOLIC PANEL - Abnormal    Sodium 133 (*)     Potassium 4.0      Chloride 100      CO2 21 (*)     Glucose 95      BUN 8      Creatinine 1.0      Calcium 9.8      Total Protein 7.1      Albumin 3.9      Total Bilirubin 0.7      Alkaline Phosphatase 68      AST 34      ALT 19      eGFR >60.0      Anion Gap 12     POCT GLUCOSE - Abnormal    POCT Glucose 125 (*)    PROTIME-INR    Prothrombin Time 12.2      INR 1.1     TYPE & SCREEN    Group & Rh A POS      Indirect Shobha NEG      Specimen Outdate 09/23/2024 23:59     POCT GLUCOSE    POCT Glucose 110     POCT GLUCOSE MONITORING CONTINUOUS     EKG Readings: (Independently Interpreted)   Initial Reading: No STEMI. Previous EKG: Compared with most recent EKG Previous EKG Date: 9/12/2024. Rhythm: Sinus Arrhythmia. Heart Rate: 65. Clinical Impression: Sinus  Arrhythmia and Normal Sinus Rhythm     ECG Results              EKG 12-lead (Final result)        Collection Time Result Time QRS Duration OHS QTC Calculation    09/20/24 14:27:01 09/20/24 14:35:38 94 472                     Final result by Interface, Lab In [facility] (09/20/24 14:35:46)                   Narrative:    Test Reason : W19.XXXA,    Vent. Rate : 065 BPM     Atrial Rate : 065 BPM     P-R Int : 180 ms          QRS Dur : 094 ms      QT Int : 454 ms       P-R-T Axes : 071 025 047 degrees     QTc Int : 472 ms    Normal sinus rhythm with sinus arrhythmia  Normal ECG  When compared with ECG of 12-SEP-2024 10:48,  No significant change was found  Confirmed by Wood SCHROEDER MD ([id number]) on 9/20/2024 2:35:37 PM    Referred By: JUMANA   SELF           Confirmed By:Wood SCHROEDER MD                                  Imaging Results              X-Ray Elbow Complete Left (Final result)  Result time 09/20/24 11:20:37      Final result by Archie Reno MD (09/20/24 11:20:37)                   Impression:      No convincing evidence of acute fracture or dislocation.      Electronically signed by: Archie Reno  Date:    09/20/2024  Time:    11:20               Narrative:    EXAMINATION:  XR ELBOW COMPLETE 3 VIEW LEFT    CLINICAL HISTORY:  Unspecified fall, initial encounter    TECHNIQUE:  AP, lateral, and oblique views of the left elbow were performed.    COMPARISON:  None    FINDINGS:  No definite evidence of acute fracture or dislocation.  Joint spaces appear maintained.  No large joint effusion.  Enthesopathic and degenerative changes are noted about the elbow joint.  No definite evidence of radiopaque foreign body.                                        CT Head Without Contrast (Final result)  Result time 09/20/24 09:49:57      Final result by Archie Reno MD (09/20/24 09:49:57)                   Impression:      1. Thin left posterior parafalcine and tentorial leaflet acute subdural blood without associated  mass effect.  2. No acute cervical spine fracture.  Degenerative findings the cervical spine, as discussed.  This report was flagged in Epic as abnormal.      Electronically signed by: Archie Reno  Date:    09/20/2024  Time:    09:49               Narrative:    EXAMINATION:  CT HEAD WITHOUT CONTRAST; CT CERVICAL SPINE WITHOUT CONTRAST    CLINICAL HISTORY:  Head trauma, minor, normal mental status (Age 19-64y);; Neck trauma (Age >= 65y);    TECHNIQUE:  Low dose axial images were obtained through the head and cervical spine.  Coronal and sagittal reformations were also performed. Contrast was not administered.    COMPARISON:  CT head performed 09/12/2024.    FINDINGS:  Head:    Blood: Thin left posterior parafalcine and tentorial leaflet acute subdural blood without associated mass effect.    Parenchyma: No definite loss of gray-white differentiation to suggest acute or subacute transcortical infarct.  Redemonstrated left occipital lobe encephalomalacia and gliosis.  Elsewhere.  Generalized pattern age-related parenchymal volume loss.  Nonspecific areas of white matter hypoattenuation may reflect sequela of chronic small vessel ischemic disease.    Ventricles/Extra-axial spaces: As above.  No evidence of hydrocephalus.  Basal cisterns patent.    Vessels: Moderate atherosclerotic calcification.    Orbits: Unremarkable.    Scalp: High right posterior parietal scalp laceration with subjacent subcutaneous soft tissue contusion.    Skull: There are no depressed skull fractures or destructive bone lesions.    Sinuses and mastoids: Scattered minor paranasal sinus mucosal thickening.    Other findings: Redemonstrated remote nasal bone and maxillary sinus wall fractures.    Cervical spine:    Fractures: No acute fractures    Alignment: Reversal of anticipated cervical lordosis, with focal kyphosis centered at the C4-5 level.  Grade 1 anterolisthesis of C4 on C5.    Atlanto-occipital joints: Atlanto-axial and  atlanto-occipital intervals are not widened.  Facet joints: There is no traumatic facet joint widening.  Degenerative facet arthropathy.  Vertebral bodies: Degenerative endplate change.  Discs: Degenerative disc disease.  Spinal canal and foraminal narrowing: Although CT does not optimally evaluate the soft tissue contents of the spinal canal and foramina, no critical stenosis is suggested.    At C2-3, severe right foraminal narrowing    At C3-4, moderate right and severe left foraminal narrowing at C4-5, severe right and moderate to severe left foraminal narrowing    At C5-6, mild-to-moderate central spinal canal stenosis and moderate to severe bilateral foraminal narrowing    At C6-7, mild central spinal canal stenosis and moderate to severe right foraminal narrowing      Paraspinal soft tissues: Unremarkable.    Upper Lungs:Clear                                        CT Cervical Spine Without Contrast (Final result)  Result time 09/20/24 09:49:57      Final result by Archie Reno MD (09/20/24 09:49:57)                   Impression:      1. Thin left posterior parafalcine and tentorial leaflet acute subdural blood without associated mass effect.  2. No acute cervical spine fracture.  Degenerative findings the cervical spine, as discussed.  This report was flagged in Epic as abnormal.      Electronically signed by: Archie Reno  Date:    09/20/2024  Time:    09:49               Narrative:    EXAMINATION:  CT HEAD WITHOUT CONTRAST; CT CERVICAL SPINE WITHOUT CONTRAST    CLINICAL HISTORY:  Head trauma, minor, normal mental status (Age 19-64y);; Neck trauma (Age >= 65y);    TECHNIQUE:  Low dose axial images were obtained through the head and cervical spine.  Coronal and sagittal reformations were also performed. Contrast was not administered.    COMPARISON:  CT head performed 09/12/2024.    FINDINGS:  Head:    Blood: Thin left posterior parafalcine and tentorial leaflet acute subdural blood without associated  mass effect.    Parenchyma: No definite loss of gray-white differentiation to suggest acute or subacute transcortical infarct.  Redemonstrated left occipital lobe encephalomalacia and gliosis.  Elsewhere.  Generalized pattern age-related parenchymal volume loss.  Nonspecific areas of white matter hypoattenuation may reflect sequela of chronic small vessel ischemic disease.    Ventricles/Extra-axial spaces: As above.  No evidence of hydrocephalus.  Basal cisterns patent.    Vessels: Moderate atherosclerotic calcification.    Orbits: Unremarkable.    Scalp: High right posterior parietal scalp laceration with subjacent subcutaneous soft tissue contusion.    Skull: There are no depressed skull fractures or destructive bone lesions.    Sinuses and mastoids: Scattered minor paranasal sinus mucosal thickening.    Other findings: Redemonstrated remote nasal bone and maxillary sinus wall fractures.    Cervical spine:    Fractures: No acute fractures    Alignment: Reversal of anticipated cervical lordosis, with focal kyphosis centered at the C4-5 level.  Grade 1 anterolisthesis of C4 on C5.    Atlanto-occipital joints: Atlanto-axial and atlanto-occipital intervals are not widened.  Facet joints: There is no traumatic facet joint widening.  Degenerative facet arthropathy.  Vertebral bodies: Degenerative endplate change.  Discs: Degenerative disc disease.  Spinal canal and foraminal narrowing: Although CT does not optimally evaluate the soft tissue contents of the spinal canal and foramina, no critical stenosis is suggested.    At C2-3, severe right foraminal narrowing    At C3-4, moderate right and severe left foraminal narrowing at C4-5, severe right and moderate to severe left foraminal narrowing    At C5-6, mild-to-moderate central spinal canal stenosis and moderate to severe bilateral foraminal narrowing    At C6-7, mild central spinal canal stenosis and moderate to severe right foraminal narrowing      Paraspinal soft  tissues: Unremarkable.    Upper Lungs:Clear                                       Medications   acetaminophen tablet 1,000 mg (1,000 mg Oral Given 9/20/24 5584)   LIDOcaine (PF) 10 mg/ml (1%) injection 50 mg (50 mg Infiltration Given 9/20/24 1622)     Medical Decision Making  68-year-old male past medical history of AFib (on Eliquis), DM, and alcohol abuse who presents after GLF.  Differential diagnosis including but not limited to subdural hematoma, epidural hematoma, cervical spine fracture, and left elbow fracture.    Patient noted to have occipital scalp hematoma and complaining of headache on presentation CT head without contrast remarkable for acute subdural without mass effect.  Patient was given Tylenol and Neurosurgery was consulted (recommended repeat CT head without contrast in 6 hours as well as SBP control <160).  Laceration repair performed as documented above.  CT head without contrast pending at sign-out.    Amount and/or Complexity of Data Reviewed  External Data Reviewed: notes.     Details: 09/17/2024 orthopedic progress note discussing  operative versus nonoperative treatment for nondisplaced Right bimalleolar ankle fracture.  Labs: ordered. Decision-making details documented in ED Course.     Details: Normal renal function, hemoglobin 12.8 (most recent 13.7)  Radiology: ordered and independent interpretation performed.     Details: No apparent fracture on elbow x-ray  ECG/medicine tests: ordered and independent interpretation performed.  Discussion of management or test interpretation with external provider(s): Discussed the case with Neurosurgery    Risk  OTC drugs.  Prescription drug management.              Attending Attestation:   Physician Attestation Statement for Resident:  As the supervising MD   Physician Attestation Statement: I have personally seen and examined this patient.   I agree with the above history.  -: Emergent evaluation of a 69-year-old male presenting after a fall.   Daughter is at bedside to supplement the history.  Patient was going over a bump on his way to a clinic appointment and the chair he was then folded up, resulting in a fall.  He fell onto the back of his head.  Denies loss of consciousness.  He is taking Eliquis.  He also noted an injury to his left elbow.  He has a history of prior right shoulder and right ankle injuries.  Denies new injury from the fall to either of these areas.  Last tetanus 2021.   As the supervising MD I agree with the above PE.   -: ABCs intact, GCS 15  Posterior scalp laceration, no arterial bleeding  No midline cervical, thoracic, lumbar spinal or paraspinal tenderness  No chest wall tenderness or instability  Nontender abdomen  No pelvic bony tenderness or instability  No extremity deformity, intact distal pulses  Right shoulder range of motion is limited secondary to prior injury  Left elbow with full range of motion, superficial skin tear, no active bleeding     As the supervising MD I agree with the above treatment, course, plan, and disposition.   -: Given head injury on anticoagulation, pursued advanced imaging to assess for emergent traumatic etiology.  Contacted by radiology with initial CT head read-thin subdural bleeding noted, no mass effect.  Case discussed with Neurosurgery.  Neurosurgery recommending SBP less than 160, no need for reversal of Eliquis, repeat CT head in 6 hours.  Mental status remained unchanged, blood pressure within target.  Left elbow skin tear irrigated and Band-Aid placed, no need for repair.  Scalp laceration repair with staples, these will need to be removed in 7-10 days.  Advised patient should wear the Cam boot for his right ankle.  Monitor laceration for signs of infection.  Discussed close monitoring of patient's mental status at home with daughter, should he be discharged.    Signed out to oncoming team at 2:00 p.m. pending repeat CT head, reassessment, final neurosurgery recommendations.  I was  personally present during the entire procedure.  I have reviewed and agree with the residents interpretation of the following: lab data, x-rays, CT scans and EKG.  I have reviewed the following: old records at this facility.        Attending Critical Care:   Critical Care Times:   Direct Patient Care (initial evaluation, reassessments, and time considering the case)................................................................15 minutes.   Additional History from reviewing old medical records or taking additional history from the family, EMS, PCP, etc.......................5 minutes.   Ordering, Reviewing, and Interpreting Diagnostic Studies...............................................................................................................5 minutes.   Documentation..................................................................................................................................................................................6 minutes.   Consultation with other Physicians. .................................................................................................................................................7 minutes.   ==============================================================  Total Critical Care Time - exclusive of procedural time: 38 minutes.  ==============================================================  Critical care reasons: SDH.   Critical care was time spent personally by me on the following activities: obtaining history from patient or relative, examination of patient, ordering lab, x-rays, and/or EKG, development of treatment plan with patient or relative, discussion with consultants, re-evaluation of patient's conition and ordering and performing treatments and interventions.   Critical Care Condition: potentially life-threatening           ED Course as of 09/20/24 1543   Fri Sep 20, 2024   1001 Per Radiology CT head without contrast remarkable for: Thin left  posterior parafalcine and tentorial leaflet acute subdural blood without associated mass effect.    Consulted Neurosurgery:  Recommended repeat CT head without contrast in approximately 6 and no need for Eliquis reversal at this time. []   1230 Sodium(!): 133 [AH]   1330 Per neurosurgery recommendation:  If repeat CT unremarkable patient may be discharged with return precautions, instructions to hold Eliquis for 1 week, follow up appointment in neurosurgery clinic for repeat CT head in 1 week. []      ED Course User Index  [] Lenora Cerrato MD                             Clinical Impression:  Final diagnoses:  [W19.XXXA] Fall  [S09.90XA] Injury of head, initial encounter  [S06.5X0A] Occipital subdural hematoma (Primary)  [S01.01XA] Laceration of scalp, initial encounter                 Lenora Cerrato MD  Resident  09/20/24 1447       Lenora Cerrato MD  Resident  09/20/24 1543       Derek Watkins MD  09/21/24 0525       Derek Watkins MD  09/21/24 0538

## 2024-09-20 NOTE — CODE/ RAPID DOCUMENTATION
RAPID RESPONSE NURSE NOTE        Admit Date: 2024  LOS: 0  Code Status: Prior   Date of Consult: 2024  : 1955  Age: 68 y.o.  Weight:   Wt Readings from Last 1 Encounters:   24 81.6 kg (180 lb)     Sex: male  Race: White   Bed: ED :   MRN: [id number]  Time Rapid Response Team page Received: 0747  Time Rapid Response Team at Bedside: 0747  Time Rapid Response Team left Bedside: 0800  Was the patient discharged from an ICU this admission? No  Was the patient discharged from a PACU within last 24 hours? No   Did the patient receive conscious sedation/general anesthesia in last 24 hours? No  Was the patient in the ED within the past 24 hours? No  Was the patient on NIPPV within the past 24 hours? No   Did this progress into an ARC or CPA: No  Attending Physician: FACUNDO  Primary Service: NA    SITUATION    Notified by overhead page.  Reason for alert: CODE BLUE   Called to evaluate the patient for  Fall    BACKGROUND     Why is the patient in the hospital?: Neuropsych appointment    Patient has a past medical history of A-fib, Alcohol abuse, Anxiety, Arthritis, Chronic gout, Diabetes mellitus, DM (diabetes mellitus), Fatty liver, Hyperlipidemia, Renal cell cancer, and S/P TKR (total knee replacement), right.    ASSESSMENT/INTERVENTIONS    What did you find: Pt sitting on bench near elevators with staff and family at his side, back of head bleeding. Per family, pt's walker caught on doorway, pt fell striking back of his head. Pt is able to move all extremities, converses freely and is able to answer questions appropriately. Pt assisted to wheelchair and taken to ED for evaluation.    RECOMMENDATIONS    We recommend: ED transfer and evaluation.    PROVIDER ESCALATION    Orders received and case discussed with NA.    FOLLOW UP    Call the Rapid Response Nurse, Bacilio Dunaway RN at [phone] for additional questions or concerns.

## 2024-09-20 NOTE — PROVIDER PROGRESS NOTES - EMERGENCY DEPT.
Encounter Date: 9/20/2024    ED Physician Progress Notes            ED Physician Hand-off Note:    ED Course: I assumed care of patient from off-going ED physician, Deepali Cerrato and June.  Briefly, Patient is a 68-year-old male past medical history of AFib (on Eliquis), DM, and alcohol abuse who presents after GLF.  CT head remarkable for thin left posterior parafalcine and tentorial leaflet acute subdural blood without associated mass effect. NSGY consulted. Per NSGY recs, patient has a well-healed Eliquis for 1 week.  They will arrange follow up in clinic in 1 week.    At the time of signout plan was pending Repeat CT head with stable tiny left tentorial SDH.     Disposition: Discharge.    Patient comfortable with this plan. Patient counseled regarding exam, results, diagnosis, treatment, and plan.    Impression: Subdural hematoma.    Final diagnoses:  [W19.XXXA] Fall

## 2024-09-20 NOTE — ED PROVIDER NOTES
ED physician EKG interpretation:  Normal sinus rhythm with sinus arrhythmia, 65 beats per minute, normal axis, normal intervals, no STEMI     Ramses Llamas MD  09/20/24 1595

## 2024-09-20 NOTE — CONSULTS
"[facility] - Emergency Dept  Neurosurgery  Consult Note    Inpatient consult to Neurosurgery  Consult performed by: Liyah Walsh MD  Consult ordered by: Lenora Cerrato MD        Subjective:     Chief Complaint/Reason for Admission: thin tentorial SDH    History of Present Illness: Mr Abadie is a 68M w/ hx EtOH use, a fib on eliquis, DM, RCC who presents after fall on his way to neuro psych appointment today. Patient has been experiencing some increased memory difficulty, per family at bedside unclear if due to alcoholism vs dementia, so was going for neuropsych appt. Unfortunately on way there had mechanical fall and hit head, went to ED for CTH that shows very thin left tentorial acute SDH. Patient currently has a headache, denies any other neuro sx including weakness, numbness, seizures. He does also have recent right arm and distal right leg fractures, right arm mobility is currently pain limited. He takes eliquis for a fib, denies other blood thinners.     (Not in a hospital admission)      Review of patient's allergies indicates:   Allergen Reactions    No known drug allergies        Past Medical History:   Diagnosis Date    A-fib     Alcohol abuse     Anxiety     Arthritis     Chronic gout     Diabetes mellitus     DM (diabetes mellitus) 11/16/2016    "boarderline, not taking any meds currently"    Fatty liver     Hyperlipidemia     Renal cell cancer 2014    S/P TKR (total knee replacement), right 06/27/2019     Past Surgical History:   Procedure Laterality Date    ABSCESS DRAINAGE      perirectal    COLONOSCOPY      ENDOSCOPIC ULTRASOUND OF UPPER GASTROINTESTINAL TRACT N/A 6/11/2024    Procedure: ULTRASOUND, UPPER GI TRACT, ENDOSCOPIC;  Surgeon: Mode Wood MD;  Location: [facility];  Service: Endoscopy;  Laterality: N/A;  6/7 portal-EUS in 7-10 days [place] [facility] [place] approved  Te 6/7-tt  6/10-precall complete-MS    HAND SURGERY Left     JOINT REPLACEMENT      knee replacement " right knee    KIDNEY SURGERY      partial left kidney removal - CA    PARTIAL NEPHRECTOMY Left August 2014    PERCUTANEOUS CRYOTHERAPY OF PERIPHERAL NERVE USING LIQUID NITROUS OXIDE IN CLOSED NEEDLE DEVICE Right 6/17/2019    Procedure: CRYOTHERAPY, NERVE, PERIPHERAL, PERCUTANEOUS, USING LIQUID NITROUS OXIDE IN CLOSED NEEDLE DEVICE-right knee iovera;  Surgeon: Donny Hair III, MD;  Location: [facility] CATH LAB;  Service: Pain Management;  Laterality: Right;    TOTAL KNEE ARTHROPLASTY Right 6/27/2019    Procedure: ARTHROPLASTY, KNEE, TOTAL-SAME DAY;  Surgeon: Mikael Huerta MD;  Location: [facility] OR [address] [place] [address];  Service: Orthopedics;  Laterality: Right;     Family History       Problem Relation (Age of Onset)    Alcohol abuse Brother    Alzheimer's disease Mother, Brother    Cancer Father, Sister    Colon cancer Father    Colon polyps Brother    Diabetes Mother    Lung cancer Father, Sister          Tobacco Use    Smoking status: Never    Smokeless tobacco: Never   Substance and Sexual Activity    Alcohol use: Yes     Alcohol/week: 168.0 standard drinks of alcohol     Types: 168 Cans of beer per week     Comment: 12-24  beers daily    Drug use: No    Sexual activity: Not Currently     Partners: Female     Review of Systems   Constitutional:  Negative for chills and fever.   HENT:  Negative for rhinorrhea and sore throat.    Eyes:  Negative for pain.   Respiratory:  Negative for cough and shortness of breath.    Cardiovascular:  Negative for chest pain and palpitations.   Gastrointestinal:  Negative for abdominal pain, constipation, nausea and vomiting.   Genitourinary:  Negative for dysuria, frequency and hematuria.   Musculoskeletal:  Negative for back pain and neck pain.   Skin:  Negative for pallor and rash.   Neurological:  Positive for headaches. Negative for seizures, facial asymmetry, weakness and numbness.     Objective:     Weight: 81.6 kg (180 lb)  Body mass index is 29.05 kg/m².  Vital Signs (Most  Recent):  Temp: 98.3 °F (36.8 °C) (09/20/24 0801)  Pulse: (!) 58 (09/20/24 1200)  Resp: 19 (09/20/24 1200)  BP: (!) 140/66 (09/20/24 1200)  SpO2: 98 % (09/20/24 1200) Vital Signs (24h Range):  Temp:  [98.3 °F (36.8 °C)] 98.3 °F (36.8 °C)  Pulse:  [54-60] 58  Resp:  [17-20] 19  SpO2:  [98 %-100 %] 98 %  BP: (133-140)/(66-76) 140/66                                 Physical Exam         Neurosurgery Physical Exam    Neurosurgery Physical Exam    General: well developed, well nourished, no distress.   HEENT: normocephalic  CV: regular rate   Pulmonary: normal respirations, no signs of respiratory distress  Abdomen: soft, non-distended, not tender to palpation  Skin: Skin is warm, dry and intact  Heme: no bruising    Neuro:   Mental Status: AO x4, no aphasia, no dysarthria   CN: PERRL, EOMI, VF intact to confrontation, sensation intact bilaterally, eyebrow raise and grimace symmetric, tongue midline  Motor: moves all extremities spontaneously, RUE limited mobility proximally 2/2 recent humerus fracture, strong hand . LUE and BLE full strength  Sensory: intact to light touch throughout  Reflexes: no clonus  Gait: deferred       Significant Labs:  Recent Labs   Lab 09/20/24  1148   GLU 95   *   K 4.0      CO2 21*   BUN 8   CREATININE 1.0   CALCIUM 9.8     Recent Labs   Lab 09/20/24  1034   WBC 7.68   HGB 12.8*   HCT 38.6*        Recent Labs   Lab 09/20/24  1117   INR 1.1   APTT 32.3*     Microbiology Results (last 7 days)       ** No results found for the last 168 hours. **          All pertinent labs from the last 24 hours have been reviewed.    Significant Diagnostics:  CT: CT Head Without Contrast    Result Date: 9/20/2024  1. Thin left posterior parafalcine and tentorial leaflet acute subdural blood without associated mass effect. 2. No acute cervical spine fracture.  Degenerative findings the cervical spine, as discussed. This report was flagged in Epic as abnormal. Electronically signed  by: Archieeric Reno Date:    09/20/2024 Time:    09:49   MRI: No results found in the last 24 hours.  Assessment/Plan:     Subdural hematoma  Mr Abadie is a 68M w/ hx EtOH use, a fib on eliquis, DM, RCC who presents after fall on his way to neuro psych appointment today. Patient has been experiencing some increased memory difficulty, per family at bedside unclear if due to alcoholism vs dementia, so was going for neuropsych appt. Unfortunately on way there had mechanical fall and hit head, went to ED for CTH that shows very thin left tentorial acute SDH. Patient currently has a headache, denies any other neuro sx including weakness, numbness, seizures. He does also have recent right arm and distal right leg fractures, right arm mobility is currently pain limited. He takes eliquis for a fib, denies other blood thinners.     CTH 9/20: thin left tentorial acute SDH, no fx  CT C spine 9/20: multilevel degeneratie changes with kyphotic alignment and grade 1 C4-5 anterolisthesis, no acute fractures     - interval CTH ordered   - SBP <160  - coags wnl  - hold Eliquis 7 to 10 days  - if interval CTH stable, can discharge home, will arrange clinic follow up with GUANAKITO WARNER with repeat CTH in 1 week    Discussed with staff Dr. Moreno        Thank you for your consult. Will follow up result of interval CT scan    Liyah Walsh MD  Neurosurgery  [facility] - Emergency Dept

## 2024-09-20 NOTE — SUBJECTIVE & OBJECTIVE
"(Not in a hospital admission)      Review of patient's allergies indicates:   Allergen Reactions    No known drug allergies        Past Medical History:   Diagnosis Date    A-fib     Alcohol abuse     Anxiety     Arthritis     Chronic gout     Diabetes mellitus     DM (diabetes mellitus) 11/16/2016    "boarderline, not taking any meds currently"    Fatty liver     Hyperlipidemia     Renal cell cancer 2014    S/P TKR (total knee replacement), right 06/27/2019     Past Surgical History:   Procedure Laterality Date    ABSCESS DRAINAGE      perirectal    COLONOSCOPY      ENDOSCOPIC ULTRASOUND OF UPPER GASTROINTESTINAL TRACT N/A 6/11/2024    Procedure: ULTRASOUND, UPPER GI TRACT, ENDOSCOPIC;  Surgeon: Mode Wood MD;  Location: [facility] ENDO;  Service: Endoscopy;  Laterality: N/A;  6/7 portal-EUS in 7-10 days please, [facility] or [place]-eliquis approved  Te 6/7-tt  6/10-precall complete-MS    HAND SURGERY Left     JOINT REPLACEMENT      knee replacement right knee    KIDNEY SURGERY      partial left kidney removal - CA    PARTIAL NEPHRECTOMY Left August 2014    PERCUTANEOUS CRYOTHERAPY OF PERIPHERAL NERVE USING LIQUID NITROUS OXIDE IN CLOSED NEEDLE DEVICE Right 6/17/2019    Procedure: CRYOTHERAPY, NERVE, PERIPHERAL, PERCUTANEOUS, USING LIQUID NITROUS OXIDE IN CLOSED NEEDLE DEVICE-right knee iovera;  Surgeon: Donny Hair III, MD;  Location: [facility] CATH LAB;  Service: Pain Management;  Laterality: Right;    TOTAL KNEE ARTHROPLASTY Right 6/27/2019    Procedure: ARTHROPLASTY, KNEE, TOTAL-SAME DAY;  Surgeon: Mikael Huerta MD;  Location: [facility] OR [address] [place] [address];  Service: Orthopedics;  Laterality: Right;     Family History       Problem Relation (Age of Onset)    Alcohol abuse Brother    Alzheimer's disease Mother, Brother    Cancer Father, Sister    Colon cancer Father    Colon polyps Brother    Diabetes Mother    Lung cancer Father, Sister          Tobacco Use    Smoking status: Never    Smokeless tobacco: Never "   Substance and Sexual Activity    Alcohol use: Yes     Alcohol/week: 168.0 standard drinks of alcohol     Types: 168 Cans of beer per week     Comment: 12-24  beers daily    Drug use: No    Sexual activity: Not Currently     Partners: Female     Review of Systems   Constitutional:  Negative for chills and fever.   HENT:  Negative for rhinorrhea and sore throat.    Eyes:  Negative for pain.   Respiratory:  Negative for cough and shortness of breath.    Cardiovascular:  Negative for chest pain and palpitations.   Gastrointestinal:  Negative for abdominal pain, constipation, nausea and vomiting.   Genitourinary:  Negative for dysuria, frequency and hematuria.   Musculoskeletal:  Negative for back pain and neck pain.   Skin:  Negative for pallor and rash.   Neurological:  Positive for headaches. Negative for seizures, facial asymmetry, weakness and numbness.     Objective:     Weight: 81.6 kg (180 lb)  Body mass index is 29.05 kg/m².  Vital Signs (Most Recent):  Temp: 98.3 °F (36.8 °C) (09/20/24 0801)  Pulse: (!) 58 (09/20/24 1200)  Resp: 19 (09/20/24 1200)  BP: (!) 140/66 (09/20/24 1200)  SpO2: 98 % (09/20/24 1200) Vital Signs (24h Range):  Temp:  [98.3 °F (36.8 °C)] 98.3 °F (36.8 °C)  Pulse:  [54-60] 58  Resp:  [17-20] 19  SpO2:  [98 %-100 %] 98 %  BP: (133-140)/(66-76) 140/66                                 Physical Exam         Neurosurgery Physical Exam    Neurosurgery Physical Exam    General: well developed, well nourished, no distress.   HEENT: normocephalic  CV: regular rate   Pulmonary: normal respirations, no signs of respiratory distress  Abdomen: soft, non-distended, not tender to palpation  Skin: Skin is warm, dry and intact  Heme: no bruising    Neuro:   Mental Status: AO x4, no aphasia, no dysarthria   CN: PERRL, EOMI, VF intact to confrontation, sensation intact bilaterally, eyebrow raise and grimace symmetric, tongue midline  Motor: moves all extremities spontaneously, RUE limited mobility proximally  2/2 recent humerus fracture, strong hand . LUE and BLE full strength  Sensory: intact to light touch throughout  Reflexes: no clonus  Gait: deferred       Significant Labs:  Recent Labs   Lab 09/20/24  1148   GLU 95   *   K 4.0      CO2 21*   BUN 8   CREATININE 1.0   CALCIUM 9.8     Recent Labs   Lab 09/20/24  1034   WBC 7.68   HGB 12.8*   HCT 38.6*        Recent Labs   Lab 09/20/24  1117   INR 1.1   APTT 32.3*     Microbiology Results (last 7 days)       ** No results found for the last 168 hours. **          All pertinent labs from the last 24 hours have been reviewed.    Significant Diagnostics:  CT: CT Head Without Contrast    Result Date: 9/20/2024  1. Thin left posterior parafalcine and tentorial leaflet acute subdural blood without associated mass effect. 2. No acute cervical spine fracture.  Degenerative findings the cervical spine, as discussed. This report was flagged in Epic as abnormal. Electronically signed by: Archie Reno Date:    09/20/2024 Time:    09:49   MRI: No results found in the last 24 hours.

## 2024-09-20 NOTE — TELEPHONE ENCOUNTER
Pt scheduled  ----- Message from Alison Rowe MD sent at 9/20/2024  4:05 PM CDT -----  1 week CTH with shannon thomason! Thanks!

## 2024-09-20 NOTE — PLAN OF CARE
CTH reviewed, stable tiny left tentorial SDH  No neurosurgical intervention  Hold eliquis 1 week  Will arrange follow up in clinic in 1 week with repeat CTH  Neurosurgery signing off  Please contact for any further questions/concerns    Trent Thompson MD  [facility]

## 2024-09-23 ENCOUNTER — PATIENT OUTREACH (OUTPATIENT)
Dept: EMERGENCY MEDICINE | Facility: HOSPITAL | Age: 69
End: 2024-09-23
Payer: MEDICARE

## 2024-09-23 NOTE — PROGRESS NOTES
ED Navigator called to remind Pt of appt with, [facility], Primary Care on 9-26-24 at 12 pm. Detailed v/m left.

## 2024-09-23 NOTE — PROGRESS NOTES
Call placed to f/u from recent ED visit. No answer. Detailed v/m left to f/u and remind of 9-26-24 appt with PCP. ED navigator will follow-up with patient to assist periodically.

## 2024-09-25 ENCOUNTER — CLINICAL SUPPORT (OUTPATIENT)
Dept: REHABILITATION | Facility: HOSPITAL | Age: 69
End: 2024-09-25
Payer: MEDICARE

## 2024-09-25 DIAGNOSIS — R53.1 WEAKNESS: ICD-10-CM

## 2024-09-25 DIAGNOSIS — M25.60 STIFFNESS IN JOINT: Primary | ICD-10-CM

## 2024-09-25 PROCEDURE — 97140 MANUAL THERAPY 1/> REGIONS: CPT | Mod: HCNC | Performed by: PHYSICAL THERAPIST

## 2024-09-25 PROCEDURE — 97112 NEUROMUSCULAR REEDUCATION: CPT | Mod: HCNC | Performed by: PHYSICAL THERAPIST

## 2024-09-25 NOTE — PROGRESS NOTES
[facility]   Physical Therapy Treatment Note      Name: Donald Warren Abadie  Clinic Number: [id number]    Therapy Diagnosis:   Encounter Diagnoses   Name Primary?    Stiffness in joint Yes    Weakness      Physician: Bacilio Reyes Jr., MD    Visit Date: 9/25/2024    Physician Orders: PT Eval and Treat   Medical Diagnosis from Referral:   Evaluation Date: 8/28/2024  Authorization Period Expiration: 1/24/25  Plan of Care Expiration: 12/31/24  Progress Note Due: 9/31/24  Visit # / Visits authorized: 1/ 1   FOTO: 1/1     Precautions: Standard      Time In: 1200  Time Out: 1300  Total Appointment Time (timed & untimed codes): 60 minutes     Subjective     Pt reports: doing ok. Fell and broke ankle, fell at hospital and hit head. Has 4 staples. Small bleed on brain. Doing well. Today.  He was not compliant with home exercise program.  Response to previous treatment: n/a  Functional change: n/a    Pain: 2/10  Location: right shoulder      Objective      Objective Measures updated at progress report unless specified.     Treatment     Kleber received the treatments listed below:      therapeutic exercises to develop strength and ROM for  minutes including:      manual therapy techniques: PROM were applied to the: RGHJ for 15 minutes, including:  PROM in all ranges as tolerated    neuromuscular re-education activities to improve: muscle re ed for 38 minutes. The following activities were included:  AAROM flexion, scaption, ER, IR in ranges as tolerated  Stick chest press 4 x 10  Stick flexion  Sidelying Er 3 x 10 with ME hold last    therapeutic activities to improve functional performance for   minutes, including:      Patient Education and Home Exercises       Education provided:   - issued hep    Written Home Exercises Provided: yes. Exercises were reviewed and Kleber was able to demonstrate them prior to the end of the session.  Kleber demonstrated good  understanding of the education  provided. See EMR under Patient Instructions for exercises provided during therapy sessions    Assessment     ROM is improved. PROM flexion 105, ABD 90, ER 45. Can not performed AROM in sitting but able to perform in gravity reduced positions. Updated hep to assist with loading and ROM.     Kleber Is progressing well towards his goals.   Pt prognosis is Fair.     Pt will continue to benefit from skilled outpatient physical therapy to address the deficits listed in the problem list box on initial evaluation, provide pt/family education and to maximize pt's level of independence in the home and community environment.     Pt's spiritual, cultural and educational needs considered and pt agreeable to plan of care and goals.     Anticipated barriers to physical therapy:     Short Term Goals: 8 weeks   Patient independent in initial hep  Full PROM  AROM 50% or better  Pt reports 20% improvement in function     Long Term Goals: 16-20 weeks   Pt independent in d/c hep  Full AROM  Strength 75% HHD compared to well side  Pt reports 75% improvement in function  Plan      Plan of care Certification: 8/28/2024 to 12/31/24.     Outpatient Physical Therapy 1-3 times weekly for 20 weeks to include the following interventions: Electrical Stimulation DN, Manual Therapy, Moist Heat/ Ice, Neuromuscular Re-ed, Patient Education, Self Care, Therapeutic Activities, and Therapeutic Exercise.      Murali Uriostegui, PT

## 2024-09-26 ENCOUNTER — OFFICE VISIT (OUTPATIENT)
Dept: INTERNAL MEDICINE | Facility: CLINIC | Age: 69
End: 2024-09-26
Payer: MEDICARE

## 2024-09-26 VITALS
OXYGEN SATURATION: 96 % | WEIGHT: 182.31 LBS | BODY MASS INDEX: 29.3 KG/M2 | DIASTOLIC BLOOD PRESSURE: 60 MMHG | HEART RATE: 68 BPM | SYSTOLIC BLOOD PRESSURE: 128 MMHG | HEIGHT: 66 IN

## 2024-09-26 DIAGNOSIS — F10.20 ALCOHOLISM: ICD-10-CM

## 2024-09-26 DIAGNOSIS — F55.8 ABUSE OF OTHER NON-PSYCHOACTIVE SUBSTANCES: ICD-10-CM

## 2024-09-26 DIAGNOSIS — S06.5X0D TRAUMATIC SUBDURAL HEMATOMA WITHOUT LOSS OF CONSCIOUSNESS, SUBSEQUENT ENCOUNTER: Primary | ICD-10-CM

## 2024-09-26 DIAGNOSIS — I48.91 ATRIAL FIBRILLATION WITH RVR: ICD-10-CM

## 2024-09-26 DIAGNOSIS — E11.9 TYPE 2 DIABETES MELLITUS WITHOUT COMPLICATION, WITHOUT LONG-TERM CURRENT USE OF INSULIN: ICD-10-CM

## 2024-09-26 PROCEDURE — 99999 PR PBB SHADOW E&M-EST. PATIENT-LVL IV: CPT | Mod: PBBFAC,HCNC,, | Performed by: INTERNAL MEDICINE

## 2024-09-26 PROCEDURE — 3044F HG A1C LEVEL LT 7.0%: CPT | Mod: HCNC,CPTII,S$GLB, | Performed by: INTERNAL MEDICINE

## 2024-09-26 PROCEDURE — 1159F MED LIST DOCD IN RCRD: CPT | Mod: HCNC,CPTII,S$GLB, | Performed by: INTERNAL MEDICINE

## 2024-09-26 PROCEDURE — 3074F SYST BP LT 130 MM HG: CPT | Mod: HCNC,CPTII,S$GLB, | Performed by: INTERNAL MEDICINE

## 2024-09-26 PROCEDURE — 3288F FALL RISK ASSESSMENT DOCD: CPT | Mod: HCNC,CPTII,S$GLB, | Performed by: INTERNAL MEDICINE

## 2024-09-26 PROCEDURE — 80364 OPIOID &OPIATE ANALOG 5/MORE: CPT | Mod: HCNC | Performed by: INTERNAL MEDICINE

## 2024-09-26 PROCEDURE — 80307 DRUG TEST PRSMV CHEM ANLYZR: CPT | Mod: HCNC | Performed by: INTERNAL MEDICINE

## 2024-09-26 PROCEDURE — 1101F PT FALLS ASSESS-DOCD LE1/YR: CPT | Mod: HCNC,CPTII,S$GLB, | Performed by: INTERNAL MEDICINE

## 2024-09-26 PROCEDURE — 3078F DIAST BP <80 MM HG: CPT | Mod: HCNC,CPTII,S$GLB, | Performed by: INTERNAL MEDICINE

## 2024-09-26 PROCEDURE — 99215 OFFICE O/P EST HI 40 MIN: CPT | Mod: HCNC,S$GLB,, | Performed by: INTERNAL MEDICINE

## 2024-09-26 PROCEDURE — 3008F BODY MASS INDEX DOCD: CPT | Mod: HCNC,CPTII,S$GLB, | Performed by: INTERNAL MEDICINE

## 2024-09-26 PROCEDURE — 1126F AMNT PAIN NOTED NONE PRSNT: CPT | Mod: HCNC,CPTII,S$GLB, | Performed by: INTERNAL MEDICINE

## 2024-09-26 PROCEDURE — 80326 AMPHETAMINES 5 OR MORE: CPT | Mod: HCNC | Performed by: INTERNAL MEDICINE

## 2024-09-27 NOTE — PROGRESS NOTES
CC: Follow-up    HPI:  The patient is a  69-year-old male with AFib, alcohol use, hypertension, hyperlipidemia, elevated triglycerides, type 2 diabetes, reflux, gout, renal cell cancer status post nephrectomy and anxiety presents today has a follow-up.  The patient has had multiple falls.  The patient was seen in the emergency department on September 12th and was found to have a mi displaced distal fibular fracture of the right leg.  CT of the head at that time was negative.  The patient was seen by Orthopedics.  A tall Cam boot was recommended at all times while weight-bearing.  He was seen in the emergency department on September 20th for a clinic appointment when he fell.  His daughter was pushing him in his walker when it became caught and folded.  He fell hitting the back of his head.  CT showed a thin subdural hematoma.  The patient is blood thinner was held for 7 days.  He does have a follow-up with Neurosurgery as well as CT scan of the head.  The patient is back in his own home.  He was started drinking again in his currently drinking 4 beers a day.    ROS:  The patient did have problems with hypotension and bradycardia.  He stopped his  metoprolol a proximally 1 month ago.  He reports no chest pain or shortness of breath.  He has been taking his Multaq and reports no problems with AFib.  He stopped taking gabapentin, which was prescribed to him for shoulder pain.  He reports he was medication made his hair fall out.     Physical exam:   General appearance: No acute distress   HEENT: Conjunctiva is clear.  Pupils equal.  He had no photophobia.  TMs were clear.  Nasal septum is midline without discharge.  Oropharynx was without erythema.  Trachea is midline without JVD  Pulmonary: Good inspiratory, expiratory breath sounds are heard.  His lungs are clear auscultation.  Cardiovascular: S1-S2, rhythm is regular.  Extremities without edema.    GI: Abdomen is nontender, nondistended without  hepatosplenomegaly  Derm: The patient's scalp was evaluated.  The patient has metal sutures in place.  Wound appears to be healing well.    Assessment:    Subdural hematoma   2.  AFib   3.  Alcohol abuse   4.  Type 2 diabetes  Plan: 1.  The patient already has a follow up with Neurosurgery as well as follow up CT scan   2.  Will check a urine drug screen today   3.  The patient has a form that needs to be filled out for his 's license.  He was discussed with the patient that he can not drink alcohol anymore if he wants his license.

## 2024-10-02 ENCOUNTER — HOSPITAL ENCOUNTER (OUTPATIENT)
Dept: RADIOLOGY | Facility: HOSPITAL | Age: 69
Discharge: HOME | End: 2024-10-02
Payer: MEDICARE

## 2024-10-02 ENCOUNTER — OFFICE VISIT (OUTPATIENT)
Dept: NEUROLOGY | Facility: CLINIC | Age: 69
End: 2024-10-02
Payer: MEDICARE

## 2024-10-02 ENCOUNTER — OFFICE VISIT (OUTPATIENT)
Dept: NEUROSURGERY | Facility: CLINIC | Age: 69
End: 2024-10-02
Payer: MEDICARE

## 2024-10-02 DIAGNOSIS — I62.00 NONTRAUMATIC SUBDURAL HEMORRHAGE, UNSPECIFIED: ICD-10-CM

## 2024-10-02 DIAGNOSIS — S06.5XAA SUBDURAL HEMATOMA: ICD-10-CM

## 2024-10-02 DIAGNOSIS — G93.40 ACUTE ENCEPHALOPATHY: ICD-10-CM

## 2024-10-02 DIAGNOSIS — R41.9 NEUROCOGNITIVE DISORDER: ICD-10-CM

## 2024-10-02 DIAGNOSIS — F10.20 ALCOHOL USE DISORDER, SEVERE, DEPENDENCE: Primary | ICD-10-CM

## 2024-10-02 DIAGNOSIS — F41.1 GENERALIZED ANXIETY DISORDER: Chronic | ICD-10-CM

## 2024-10-02 DIAGNOSIS — S06.5XAA SUBDURAL HEMATOMA: Primary | ICD-10-CM

## 2024-10-02 PROCEDURE — 70450 CT HEAD/BRAIN W/O DYE: CPT | Mod: TC,HCNC

## 2024-10-02 PROCEDURE — 90791 PSYCH DIAGNOSTIC EVALUATION: CPT | Mod: 95,,, | Performed by: CLINICAL NEUROPSYCHOLOGIST

## 2024-10-02 PROCEDURE — 70450 CT HEAD/BRAIN W/O DYE: CPT | Mod: 26,HCNC,, | Performed by: RADIOLOGY

## 2024-10-02 PROCEDURE — 99499 UNLISTED E&M SERVICE: CPT | Mod: 95,,, | Performed by: CLINICAL NEUROPSYCHOLOGIST

## 2024-10-02 NOTE — PROGRESS NOTES
NEUROPSYCHOLOGICAL EVALUATION - CONFIDENTIAL    Referring Provider: Quique France MD   Medical Necessity: Evaluate cognitive and emotional functioning, participate in treatment planning/management, and provide supportive therapy in the setting of alcohol use disorder and memory loss.   Date Conducted: 10/02/2024  Present At Visit: the patient and his daughter   Billin = 50 minutes  Referral Diagnoses: F10.20 (ICD-10-CM) - Alcohol use disorder, severe, dependence      R41.3 (ICD-10-CM) - Memory loss   Consent: The patient expressed an understanding of the purpose of the evaluation and consented to all procedures. He additionally provided consent to speak with his daughter, Chani, who was present during the clinical interview. We discussed the limits of confidentiality and discussed an emergency plan.    Telemedicine Details:   The patient location is: [place]  The chief complaint leading to consultation is: alcohol use disorder and memory loss  Visit type: Virtual visit with synchronous audio and video  Total time spent with patient: 50 minutes   Each patient to whom he or she provides medical services by telemedicine is: (1) informed of the relationship between the physician and patient and the respective role of any other health care provider with respect to management of the patient; and (2) notified that he or she may decline to receive medical services by telemedicine and may withdraw from such care at any time.    ASSESSMENT & PLAN:   Mr. Donald Warren Abadie is an 69 y.o., male with 12 years of formal education and pertinent medical history including generalized anxiety disorder, alcohol use disorder, severe, dependence, hyperlipidemia, aortic atherosclerosis, atrial fibrillation, metabolic dysfunction associated liver disease, type 2 diabetes, primary osteoarthritis, acute encephalopathy, recurrent falls, and recent occipital subdural hematoma who was referred for a neuropsychological evaluation in  "the setting of gradually progressive cognitive changes over the past year.       Full report to follow completion of testing (10/22 at 8:30 AM).   Problem List Items Addressed This Visit          Neuro    Acute encephalopathy    Neurocognitive disorder    Subdural hematoma       Psychiatric    Generalized anxiety disorder (Chronic)    Alcohol use disorder, severe, dependence - Primary     Thank you for allowing me to assist in Mr. Donald Warren Abadie's care. If you have any questions, please contact me at [phone].      Mary Rousseau, PhD, ABPP  Board Certified in Clinical Neuropsychology   [facility] - Department of Neurology    CLINICAL INTERVIEW & RECORD REVIEW:     Previous Workup   Cognitive screener(s): none  Previous evaluation(s): none  Neurology Visits: none. Appointment scheduled for 10/30/2024   Pertinent medical records:     08/04/2024 Inpatient Psychiatry Consult Note:    INITIAL VISIT: ADDICTION PSYCHIATRY CONSULTATION SERVICE        ASSESSMENT AND PLAN:      DIAGNOSES & PROBLEMS:  Alcohol use disorder, severe  R/o major neurocognitive disorder      In Summary:  68 year old male with hx of alcohol use disorder, AFib on Eliquis, DM, GERD, Gout, BPH, HLD, and anxiety who presented to hospital after a fall. Psychiatry was consulted for Alcohol use disorder (per son in law he drinks a case of beer/day). Admitted with alcohol withdrawal. Interested in quitting." Patient endorses drinking 4-10 beers per day; has completed rehab in the past with longest period of sobriety 5 years. Utilizes alcohol to cope with exacerbations of anxiety. Is open to IOP placement, which family is supportive of.       Plan:        With reasonable medical certainty, based on history, chart review, available collateral information, and a present-state examination:  - the patient is deemed to be currently best managed on a medical unit, owing to the need for medical stabilization  - PEC is not indicated  - " adjustments to psychotropic regimen as noted herein, otherwise continue as prescribed  - defer non-psychiatric medication(s) and management to the current provider(s) of record  - upon discharge, it is recommended to follow up with an outpatient provider within 1-2 weeks of discharge, but ideally as soon as possible  - contingent on accessibility and willingness, patient would benefit from the following referrals: addiction rehab program and mutual self-help groups (e.g. Alcoholics Anonymous, [facility])  - provide supportive care as warranted: e.g., fluid and electrolyte replacement, vitamin repletion (thiamine, folic acid, multivitamin), adequate caloric support  -- RECOMMENDATIONS FOR DETOX TAPER: initiate valium 10mg TID  - advance taper as tolerated; though it is equally as important to be prepared to increase doses of benzodiazepines in the short run if withdrawal symptoms worsen or fail to adequately improve  - options for medication-assisted treatment (MAT) for alcohol use disorder were discussed  - recommend patient enroll in addiction rehab program after discharge and medical stabilization  - counseled on full abstinence from alcohol and substances of abuse (illicit and prescription)  - full engagement in 12 step (or equivalent) recovery program(s), including meeting attendance and acquisition/maintenance of sponsor  - relapse prevention and motivational interviewing provided  - provided resources for various addiction rehabilitation options as part of aftercare planning        PRESENTATION:      Donald Warren Abadie presents with the following chief complaint: problematic substance use/abuse and alcohol and/or drug addiction     Per Chart:  He tripped and fell on his driveway, falling onto the right side of his body. Patient said he had 2 beers in the past 24 hours but normally has 4 beers a day. Patient's son-in-law is by bedside and states that he drinks a case of beer a day. The son-in-law states  "that the patient had has had past episodes of alcohol withdrawal after having to be sober in the hospital for past hospitalizations.      Per Patient:  Exam complicated by patient being hard of hearing without hearing aids in; frequently required repetition of questions or would respond inappropriately. Endorses tripping in driveway, which he attributes to his Crocs being slippery. States he drinks anywhere between 4-5 versus 10 bud light beers per day. Last drink was prior to his fall. Has been drinking since he was a child, states his family were all heavy users of alcohol and have since passed away. Has been to rehab before, did not feel it was helpful to him and views it as being "locked up". Had 5 year period of sobriety after attending rehab. Relapse occurred due to anxiety; endorses generalized anxiety with no specific triggers. 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 Sees a psychiatrist, Dr. Murrell, for his anxiety; endorses taking xanax for his anxiety. Has found AA meeting helpful in the past, but stopped going when he relapsed. Enjoys drinking alcohol with his friends, which he says he does frequently. Endorses intermittent consumption of marijuana.      Collateral:   Spoke with patient's son in law at bedside. Reports patient has had extensive hx of hospitalizations for alcohol withdrawal, including hx of delirium tremens. Has coded in the past as a result. Has tried naltrexone, disulfiram in the past but discontinued due to not liking the effects. Is prescribed sertraline but attempts to take prn versus daily. Family installed cameras in patient's house due to concern for well-being. Patient will go for periods without eating when he is drinking excessively. There is concern for is cognitive status, as pt has strong family hx of Alzheimer disease. Family is unsure if his cognitive decline is a result of alcohol use, " dementia, or a result of pt being hard of hearing and not using hearing aids. No hx of seizures as a result of withdrawal. Son-in-law very interested in [facility], as pt has done well in the past and achieved prolonged sobriety while undergoing IOP tx.           09/12/2024 ED Visit (2 hours)    Fall       Multiplied falls over the last month , increased confusion last few days , drinks etoh daily   Orthostatics per daughter standing 94/66 hr 80, sitting 112/71 HR 71, laying 120/71 HR 62      Patient is a 68-year-old male with history of alcohol abuse.  History of AFib on blood thinners.  Patient has a diabetic.  Patient presents with family due to confusion with recurrent falls.  Patient here with daughter.  Patient is unsure of his last drink of alcohol.  Complains of right ankle pain.  Patient denies chest pain or shortness of breath.  No vomiting or diarrhea.  No urinary symptoms.  No headache or neck pain.  No weakness or numbness.  No bowel or bladder symptoms.  No problems with vision or his speech.  Patient does have history of compression type fracture of the right shoulder.    09/20/2024 ED Visit (9 hours)   Fall       Pt was here for an appointment. Pt tripped on walker fell and struck the back of his head. Pt on blood thinners. Pt reports headache. Pt has recent fractures to right arm and right leg. Pt has lac to the back of the head with swelling. Bleeding controlled.      68-year-old with past medical history of AFib (on Eliquis), alcohol use disorder, anxiety, CHF, DM type II, with a recent right ankle and arm fracture who presents to the emergency department after GLF.  Per daughter at bedside, patient was on the way to neuropsych appointment for evaluation of dementia when he fell out of his wheelchair.  Daughter reports that they were going over a bump when the chair folded and patient flew out and hit the back of his head.  Report right upper extremity pain that is chronic 2/2 fracture and denies  "change in intensity or new injury s/p GLF today.  Daughter reports patient consumed an alcohol beverage prior to appointment.  Patient complains of headache but denies loss of consciousness, visual changes, nausea, and vomiting.  He denies shortness of breath, chest pain, dizziness, lightheadedness, and abdominal pain.    Cognitive Functioning   Onset of difficulty: the past year   Course of difficulty: gradually and progressively worsening. His daughter has not noticed a significant change since his recent occipital subdural hematoma. She also denied noticing significant fluctuations in alertness, though feels he is "off today." He has had several falls within the past year and 6 falls within the past month. He has agreed to go to rehab after talking with doctors, and then does not follow through and instead starts drinking again. He is refusing to stay at his daughter's house, so he has been at his own home and not complying with any of his basic activities/needs. When he was living at his daughter's house, she was making sure he took his medications and was limiting his alcohol consumption. She noticed he cognitively cleared up some when this occurred. Not a full return to baseline, but much clearer.    Examples:   Attention/Working Memory/Executive Functioning: gets distracted easily, can't hear well. Not keeping up with complex EF tasks. Doesn't always answer his phone. Daughter is unsure of what is occurring at times.   Processing Speed: slower   Language: some word finding trouble.   Visuospatial: daughter worried about him driving.   Learning & Memory: couldn't tell them how to make a recipe he has always made. Forgetful of information.  Exacerbating factors: alcohol use, living on his own  Ameliorating factors: none  Medication for cognition: none    Daily Functioning    Bathing: independent and without difficulty  Dressing: independent and without difficulty  Grooming: independent and without " difficulty  Toileting: having some bowel accidents,   Transferring: independent and without difficulty.  Eating: independent and without difficulty.    Finances: most on autopay   Medication Mgmt: daughter helping  Driving: not supposed to be driving right now. Dr. Larry monitoring right now.   Household Mgmt: daughter helping  Cooking/Meal Preparation: daughter helping   Shopping: daughter helping  Appointment Mgmt: daughter helping     Safety Issues: EPS report was made recently. Currently has an open file. He has a  named Cate and  named Jeny that are currently involved.    Psychiatric/Neuropsychiatric Symptoms   Mood: fine  Depression: yes - doesn't do anything. Doesn't talk to anyone.   Kerry/Hypomania: no  Anxiety/Stress: yes  Social Withdrawal: yes  Neurovegetative Sxs:  Appetite: reduced   Sleep: taking sertraline and helping him to sleep he says.   Energy: variable  Hallucinations: has had full-blown conversations with someone when he is in a room by himself.  Delusional/Paranoid Thinking: can get paranoid about money and hyper focuses on things. Accused his son-in-law of hiding something in his home.   Impulsivity: no  Obsessive/Compulsive Behaviors: no  Disinhibition: no  Irritability/Agitation: yes - been worse in the past year.   Aggression: no  Apathy/Indifference: no  Problems with Empathy: no  Other changes in personality: no    Physical Functioning   Tremor: no  Difficulty walking: yes, listing to the side. Not walking normally, per his daughter   Imbalance: yes  Falls: numerous falls over the past years and 6 falls over the past month.    Weakness: yes  Lightheadedness: yes  Urinary or Bowel Urgency/Incontinence: yes  Sensory Sxs: hearing much worse. Can't hear out of one ear.   Pain: back pain, stenosis, arm and foot pain. Hasn't been complaining as much of those.     RELEVANT HISTORY  This patient has a past medical history of A-fib, Alcohol abuse, Anxiety, Arthritis,  Chronic gout, Diabetes mellitus, DM (diabetes mellitus) (11/16/2016), Fatty liver, Hyperlipidemia, Renal cell cancer (2014), and S/P TKR (total knee replacement), right (06/27/2019).    Past Surgical History:   Procedure Laterality Date    ABSCESS DRAINAGE      perirectal    COLONOSCOPY      ENDOSCOPIC ULTRASOUND OF UPPER GASTROINTESTINAL TRACT N/A 6/11/2024    Procedure: ULTRASOUND, UPPER GI TRACT, ENDOSCOPIC;  Surgeon: Mode Wood MD;  Location: [facility];  Service: Endoscopy;  Laterality: N/A;  6/7 portal-EUS in 7-10 days please, [facility] or Nicole-eliquis approved  Te 6/7-tt  6/10-precall complete-MS    HAND SURGERY Left     JOINT REPLACEMENT      knee replacement right knee    KIDNEY SURGERY      partial left kidney removal - [place]    PARTIAL NEPHRECTOMY Left August 2014    PERCUTANEOUS CRYOTHERAPY OF PERIPHERAL NERVE USING LIQUID NITROUS OXIDE IN CLOSED NEEDLE DEVICE Right 6/17/2019    Procedure: CRYOTHERAPY, NERVE, PERIPHERAL, PERCUTANEOUS, USING LIQUID NITROUS OXIDE IN CLOSED NEEDLE DEVICE-right knee iovera;  Surgeon: Donny Hair III, MD;  Location: [facility] CATH LAB;  Service: Pain Management;  Laterality: Right;    TOTAL KNEE ARTHROPLASTY Right 6/27/2019    Procedure: ARTHROPLASTY, KNEE, TOTAL-SAME DAY;  Surgeon: Mikael Huerta MD;  Location: [facility] OR [address] [place] [address];  Service: Orthopedics;  Laterality: Right;     Neurological History    Headaches/Migraines: no  TBI: no  Seizures: no prior history per records. Daughter has found him down in his home with feces covering him.   Stroke: had a mild stroke years ago   Tumor: no  Previous Episodes of Delirium: no  Movement Disorder: no  CNS Infection: no  Other: no    Neurodiagnostics     Results for orders placed or performed during the hospital encounter of 10/02/24   CT Head Without Contrast    Narrative    EXAMINATION:  CT HEAD WITHOUT CONTRAST    CLINICAL HISTORY:  Subdural hemorrhage;  Nontraumatic subdural hemorrhage,  unspecified    TECHNIQUE:  Multiple sequential 5 mm axial images of the head without contrast.  Coronal and sagittal reformatted imaging from the axial acquisition.    COMPARISON:  09/20/2024    FINDINGS:  Generalized cerebral volume loss with compensatory enlargement ventricles sulci and cisterns without hydrocephalus.  Slight prominence of the extra-axial space overlying the frontal lobes bilaterally similar to prior suggestive for compensatory enlargement related to volume loss versus subdural hygromas.  No evidence for acute intracranial hemorrhage.  Trace residual hyperdensity along the left tentorial leaflet suggestive for resolving small volume subdural hemorrhage    No new abnormal parenchymal attenuation.  Visualized paranasal sinuses and mastoid air cells are clear.      Impression    Continue though reduced sized trace left tentorial leaflet subdural hemorrhage    No evidence for significant new hemorrhage or new abnormal parenchymal attenuation    Clinical correlation and continued follow-up advised.      Electronically signed by: Joe Arnold DO  Date:    10/02/2024  Time:    12:33   Results for orders placed or performed during the hospital encounter of 09/07/24   MRI Brain Without Contrast    Narrative    EXAMINATION:  MRI BRAIN WITHOUT CONTRAST    CLINICAL HISTORY:  Memory loss; Other amnesia    TECHNIQUE:  Multiplanar multisequence MR imaging of the brain was performed without contrast.    COMPARISON:  CT head performed 08/04/2021.  MRI of the brain performed 03/22/2019.    FINDINGS:  Parenchyma: There is no restricted diffusion to suggest acute or subacute ischemic infarct.Small remote PCA territory infarct in the medial left occipital lobe.  Elsewhere, generalized pattern of age-related parenchymal volume loss.    Additional comments: There is no midline shift, abnormal extra-axial fluid collection, or acute intracranial hemorrhage. The basal cisterns are patent.    Ventricles: Normal.    Flow  "voids: The normal major intracranial arterial flow voids are visualized.    Sinuses and mastoid air cells: Scattered relatively modest paranasal sinus mucosal thickening with small retention cysts.  Suspected Tornwaldt cyst versus retention cyst in the posterior midline nasopharynx.    Orbits: Normal    Midline structures: The pituitary and craniocervical junction are normal.    Marrow: Normal        Impression    1. No definite acute findings identified.  2. No definite etiology of the patient's symptoms identified.  Subjectively, there appears to be a relatively generalized pattern of parenchymal volume loss.  3. Small remote left PCA territory infarct.      Electronically signed by: Archie Reno  Date:    09/08/2024  Time:    06:05     *Note: Due to a large number of results and/or encounters for the requested time period, some results have not been displayed. A complete set of results can be found in Results Review.     Pertinent Lab Work     Lab Results   Component Value Date    TWNNLEQI19 483 06/19/2023     No results found for: "RPR"  Lab Results   Component Value Date    FOLATE 11.9 03/18/2019     Lab Results   Component Value Date    TSH 2.089 10/29/2023    L1TMIEJ 6.4 05/06/2021     Lab Results   Component Value Date    HGBA1C 5.9 (H) 02/29/2024     Lab Results   Component Value Date    JOP77XYPB Non-reactive 06/21/2023       Medications     Current Outpatient Medications:     allopurinoL (ZYLOPRIM) 100 MG tablet, TAKE 2 TABLETS(200 MG) BY MOUTH EVERY DAY, Disp: 180 tablet, Rfl: 0    cyanocobalamin (VITAMIN B-12) 1000 MCG tablet, Take 1 tablet (1,000 mcg total) by mouth once daily., Disp: , Rfl:     diclofenac sodium (VOLTAREN) 1 % Gel, Apply 2 g topically 3 (three) times daily as needed (Right knee - hand pain)., Disp: , Rfl:     dronedarone (MULTAQ) 400 mg Tab, Take 1 tablet (400 mg total) by mouth 2 (two) times daily with meals. (Patient taking differently: Take 400 mg by mouth once daily.), Disp: 60 " tablet, Rfl: 11    ELIQUIS 5 mg Tab, TAKE 1 TABLET BY MOUTH TWICE DAILY (Patient taking differently: Take 5 mg by mouth 2 (two) times daily.), Disp: 180 tablet, Rfl: 3    folic acid (FOLVITE) 1 MG tablet, Take 1 tablet (1 mg total) by mouth once daily., Disp: 30 tablet, Rfl: 11    pantoprazole (PROTONIX) 40 MG tablet, Take 1 tablet (40 mg total) by mouth once daily., Disp: 90 tablet, Rfl: 3    rOPINIRole (REQUIP) 1 MG tablet, TAKE 1 TABLET(1 MG) BY MOUTH EVERY EVENING (Patient taking differently: Take 1 tablet by mouth daily as needed (restless leg).), Disp: 90 tablet, Rfl: 3    rosuvastatin (CRESTOR) 20 MG tablet, TAKE 1 TABLET(20 MG) BY MOUTH EVERY DAY, Disp: 90 tablet, Rfl: 3    sertraline (ZOLOFT) 100 MG tablet, Take 1 tablet (100 mg total) by mouth once daily., Disp: 90 tablet, Rfl: 3    tamsulosin (FLOMAX) 0.4 mg Cap, TAKE 1 CAPSULE(0.4 MG) BY MOUTH EVERY DAY (Patient taking differently: Take 0.4 mg by mouth every evening.), Disp: 90 capsule, Rfl: 3    VITAMIN B-1 100 MG tablet, TAKE 1 TABLET BY MOUTH ONCE DAILY, Disp: 30 tablet, Rfl: 2     Psychiatric History   Prior Diagnoses: alcohol abuse, severe and anxiety   History of Trauma/Abuse: no  History of Suicide Attempts: no  Current Suicidal Ideation, Intention, or Plan: no  Current Homicidal Ideation, Intention, or Plan: no  Medication(s): Zoloft 100 mg   Hospitalization(s): rehab  Psychotherapy/Counseling: rehab & some talk therapy in the past. Not currently attending.   Other: no    Substance Use History   Mr. Abadie  reports that he has never smoked. He has never used smokeless tobacco. He reports current alcohol use of about 168.0 standard drinks of alcohol per week. He reports that he does not use drugs. History of abuse/overuse: yes - severe alcohol use disorder. has attempted several rehab stays in the past. Recently agreed to go to [facility] rehab, but then did not comply. Was sober for 2 years before COVID, but then began consuming alcohol heavily again.   "    Family Neurological & Psychiatric History     family history includes Alcohol abuse in his brother; Alzheimer's disease in his brother and mother; Cancer in his father and sister; Colon cancer in his father; Colon polyps in his brother; Diabetes in his mother; Lung cancer in his father and sister.  Neurologic: see above. Brother had LBD.   Psychiatric: see above    Development  Education   Born & raised: [place]  Prenatal and  development: wnl  Developmental milestones: wnl  Language Acquisition: English first language  Level Attained: HS  Learning/Attention/Behavior Difficulties: no  Repeated Grade(s): no          Occupation  Social   Occupational Status: Retired   Primary Occupation: [employer].  Family Status: Single. 1 daughter.   Support System: daughter  Hobbies/Activities: not doing much of anything   Current Living Situation: lives alone      Legal History   Current: no    OBJECTIVE:     Mental Status and Observations  Appearance: Casually dressed and adequate grooming/hygiene.   Alertness: Attentive but somnolent.    Orientation:   O x 4    Gait:  Unable to assess   Psychomotor:  Unable to assess   Handedness:  Right   Vision & Hearing:  [place] which interfered.    Speech/language: Normal in rate, rhythm, tone, and volume. No significant word finding difficulty observed. Comprehension was normal.   Mood/Affect:  The patient's stated mood was "fine." Affect was euthymic.     Interpersonal Behavior:  Rapport was quickly and easily established    Suicidality/Homicidality: Denied   Hallucinations/Delusions:  None evidenced or endorsed   Thought Content: Logical   Though Processes: Goal-directed   Insight & Judgment:  Appropriate   Participation in Interview:  Minimal + collateral     Procedures/Tests Administered    Performed a review of pertinent medical records, reviewed limits to confidentiality, conducted a clinical interview, and explained procedures.                               "

## 2024-10-03 ENCOUNTER — TELEPHONE (OUTPATIENT)
Dept: INTERNAL MEDICINE | Facility: CLINIC | Age: 69
End: 2024-10-03
Payer: MEDICARE

## 2024-10-03 ENCOUNTER — LAB VISIT (OUTPATIENT)
Dept: LAB | Facility: HOSPITAL | Age: 69
End: 2024-10-03
Attending: INTERNAL MEDICINE
Payer: MEDICARE

## 2024-10-03 ENCOUNTER — OFFICE VISIT (OUTPATIENT)
Dept: INTERNAL MEDICINE | Facility: CLINIC | Age: 69
End: 2024-10-03
Payer: MEDICARE

## 2024-10-03 VITALS
WEIGHT: 172.81 LBS | HEART RATE: 69 BPM | OXYGEN SATURATION: 95 % | HEIGHT: 66 IN | DIASTOLIC BLOOD PRESSURE: 70 MMHG | BODY MASS INDEX: 27.77 KG/M2 | SYSTOLIC BLOOD PRESSURE: 119 MMHG

## 2024-10-03 DIAGNOSIS — S06.5X0D TRAUMATIC SUBDURAL HEMATOMA WITHOUT LOSS OF CONSCIOUSNESS, SUBSEQUENT ENCOUNTER: Primary | ICD-10-CM

## 2024-10-03 DIAGNOSIS — E11.9 TYPE 2 DIABETES MELLITUS WITHOUT COMPLICATION, WITHOUT LONG-TERM CURRENT USE OF INSULIN: ICD-10-CM

## 2024-10-03 DIAGNOSIS — F10.20 ALCOHOLISM: ICD-10-CM

## 2024-10-03 DIAGNOSIS — F55.8 ABUSE OF OTHER NON-PSYCHOACTIVE SUBSTANCES: ICD-10-CM

## 2024-10-03 DIAGNOSIS — I48.91 ATRIAL FIBRILLATION WITH RVR: ICD-10-CM

## 2024-10-03 DIAGNOSIS — H61.21 HEARING LOSS OF RIGHT EAR DUE TO CERUMEN IMPACTION: ICD-10-CM

## 2024-10-03 PROBLEM — R41.9 NEUROCOGNITIVE DISORDER: Status: ACTIVE | Noted: 2024-10-03

## 2024-10-03 LAB
ALBUMIN SERPL BCP-MCNC: 4.2 G/DL (ref 3.5–5.2)
ALP SERPL-CCNC: 79 U/L (ref 55–135)
ALT SERPL W/O P-5'-P-CCNC: 23 U/L (ref 10–44)
ANION GAP SERPL CALC-SCNC: 15 MMOL/L (ref 8–16)
AST SERPL-CCNC: 43 U/L (ref 10–40)
BILIRUB SERPL-MCNC: 0.4 MG/DL (ref 0.1–1)
BUN SERPL-MCNC: 9 MG/DL (ref 8–23)
CALCIUM SERPL-MCNC: 9.4 MG/DL (ref 8.7–10.5)
CHLORIDE SERPL-SCNC: 99 MMOL/L (ref 95–110)
CO2 SERPL-SCNC: 18 MMOL/L (ref 23–29)
CREAT SERPL-MCNC: 1.1 MG/DL (ref 0.5–1.4)
EST. GFR  (NO RACE VARIABLE): >60 ML/MIN/1.73 M^2
GLUCOSE SERPL-MCNC: 105 MG/DL (ref 70–110)
POTASSIUM SERPL-SCNC: 3.7 MMOL/L (ref 3.5–5.1)
PROT SERPL-MCNC: 7.3 G/DL (ref 6–8.4)
SODIUM SERPL-SCNC: 132 MMOL/L (ref 136–145)
VIT B12 SERPL-MCNC: 484 PG/ML (ref 210–950)

## 2024-10-03 PROCEDURE — 84425 ASSAY OF VITAMIN B-1: CPT | Mod: HCNC | Performed by: INTERNAL MEDICINE

## 2024-10-03 PROCEDURE — 3008F BODY MASS INDEX DOCD: CPT | Mod: HCNC,CPTII,S$GLB, | Performed by: INTERNAL MEDICINE

## 2024-10-03 PROCEDURE — 36415 COLL VENOUS BLD VENIPUNCTURE: CPT | Mod: HCNC | Performed by: INTERNAL MEDICINE

## 2024-10-03 PROCEDURE — 80053 COMPREHEN METABOLIC PANEL: CPT | Mod: HCNC | Performed by: INTERNAL MEDICINE

## 2024-10-03 PROCEDURE — 1101F PT FALLS ASSESS-DOCD LE1/YR: CPT | Mod: HCNC,CPTII,S$GLB, | Performed by: INTERNAL MEDICINE

## 2024-10-03 PROCEDURE — 3288F FALL RISK ASSESSMENT DOCD: CPT | Mod: HCNC,CPTII,S$GLB, | Performed by: INTERNAL MEDICINE

## 2024-10-03 PROCEDURE — 99214 OFFICE O/P EST MOD 30 MIN: CPT | Mod: HCNC,S$GLB,, | Performed by: INTERNAL MEDICINE

## 2024-10-03 PROCEDURE — 99999 PR PBB SHADOW E&M-EST. PATIENT-LVL IV: CPT | Mod: PBBFAC,HCNC,, | Performed by: INTERNAL MEDICINE

## 2024-10-03 PROCEDURE — 3044F HG A1C LEVEL LT 7.0%: CPT | Mod: HCNC,CPTII,S$GLB, | Performed by: INTERNAL MEDICINE

## 2024-10-03 PROCEDURE — 82607 VITAMIN B-12: CPT | Mod: HCNC | Performed by: INTERNAL MEDICINE

## 2024-10-03 PROCEDURE — 1126F AMNT PAIN NOTED NONE PRSNT: CPT | Mod: HCNC,CPTII,S$GLB, | Performed by: INTERNAL MEDICINE

## 2024-10-03 PROCEDURE — 3078F DIAST BP <80 MM HG: CPT | Mod: HCNC,CPTII,S$GLB, | Performed by: INTERNAL MEDICINE

## 2024-10-03 PROCEDURE — 3074F SYST BP LT 130 MM HG: CPT | Mod: HCNC,CPTII,S$GLB, | Performed by: INTERNAL MEDICINE

## 2024-10-03 NOTE — PROGRESS NOTES
CC: Follow-up     HPI:  The patient is a 69-year-old male with AFib, alcohol use, hypertension, hyperlipidemia, elevated triglycerides, type 2 diabetes, reflux, gout, renal cell cancer status post nephrectomy, anxiety, distal fibular fracture of the right leg , right humeral head fracture and subdural hematoma presents today for follow-up.  When we had last seen the patient, he reports he was still drinking about 3 or 4 beers a day.  He was supposed to stop.  He reports he was still drinking 3-4 beers a day.  His daughter reports he was unsteady gait he also has problems with orthostasis.  He was not taking metoprolol currently.  He was taking his Multaq twice a day.  He was not seen Cardiology on follow-up in awhile.    ROS: Patient reports no chest pain.  No shortness a breath.  He does get diarrhea on occasion which he was daughter attributes to heavy drinking.  He does report decreased hearing in the right ear.  He was not wearing his Cam boot for his fracture.    Physical exam:   General appearance: No acute distress  HEENT: Conjunctiva is clear.  Pupils equal.  Left TMs clear.  Right is obscured by wax.  Nasal septum midline without discharge.  Oropharynx is without erythema.  Trachea is midline without JVD.    Pulmonary: Good inspiratory, expiratory breath sounds are heard.  Lungs are clear to auscultation.    Cardiovascular:  S1-S2, rhythm is regular.  Extremities without edema.  GI: Abdomen is nontender, nondistended without hepatosplenomegaly  Comments: Did discuss with the patient that he has to stop drinking.  I will order another drug test on Monday of next week.    Assessment:    Alcohol use   2.  Subdural hematoma   3.  Atrial fibrillation   4.  Cerumen impaction  5.  Type 2 diabetes  Plan:    Will check a CMP, B12 and folate level   2.  We will refer the patient to cardiology  3.  We will refer the patient to ENT  4.  The patient has follow up pending test results.

## 2024-10-09 ENCOUNTER — HOSPITAL ENCOUNTER (OUTPATIENT)
Dept: RADIOLOGY | Facility: HOSPITAL | Age: 69
Discharge: HOME | End: 2024-10-09
Attending: INTERNAL MEDICINE
Payer: MEDICARE

## 2024-10-09 DIAGNOSIS — K76.0 METABOLIC DYSFUNCTION-ASSOCIATED STEATOTIC LIVER DISEASE (MASLD): ICD-10-CM

## 2024-10-09 PROCEDURE — 76700 US EXAM ABDOM COMPLETE: CPT | Mod: TC,HCNC

## 2024-10-09 PROCEDURE — 76700 US EXAM ABDOM COMPLETE: CPT | Mod: 26,HCNC,, | Performed by: RADIOLOGY

## 2024-10-10 LAB — VIT B1 BLD-MCNC: 92 UG/L (ref 38–122)

## 2024-10-14 ENCOUNTER — TELEPHONE (OUTPATIENT)
Dept: INTERNAL MEDICINE | Facility: CLINIC | Age: 69
End: 2024-10-14
Payer: MEDICARE

## 2024-10-14 NOTE — TELEPHONE ENCOUNTER
Pt was calling to verify if he is able to still drop urine specimen off. Informed pt that is able to drop off urine.

## 2024-10-14 NOTE — TELEPHONE ENCOUNTER
----- Message from Nellie sent at 10/11/2024  2:49 PM CDT -----  Type:  Needs Medical Advice/paper work     Who Called: pt    Would the patient rather a call back or a response via [facility]? Call   Best Call Back Number: [phone]  Additional Information: pt requesting a call back to discuss paper work

## 2024-10-16 ENCOUNTER — OUTPATIENT CASE MANAGEMENT (OUTPATIENT)
Dept: ADMINISTRATIVE | Facility: OTHER | Age: 69
End: 2024-10-16
Payer: MEDICARE

## 2024-10-16 ENCOUNTER — CLINICAL SUPPORT (OUTPATIENT)
Dept: REHABILITATION | Facility: HOSPITAL | Age: 69
End: 2024-10-16
Payer: MEDICARE

## 2024-10-16 DIAGNOSIS — M25.60 STIFFNESS IN JOINT: Primary | ICD-10-CM

## 2024-10-16 DIAGNOSIS — R53.1 WEAKNESS: ICD-10-CM

## 2024-10-16 PROCEDURE — 97110 THERAPEUTIC EXERCISES: CPT | Mod: HCNC | Performed by: PHYSICAL THERAPIST

## 2024-10-16 PROCEDURE — 97112 NEUROMUSCULAR REEDUCATION: CPT | Mod: HCNC | Performed by: PHYSICAL THERAPIST

## 2024-10-16 PROCEDURE — 97140 MANUAL THERAPY 1/> REGIONS: CPT | Mod: HCNC | Performed by: PHYSICAL THERAPIST

## 2024-10-16 NOTE — PROGRESS NOTES
[facility]   Physical Therapy Treatment Note      Name: Donald Warren Abadie  Clinic Number: [id number]    Therapy Diagnosis:   Encounter Diagnoses   Name Primary?    Stiffness in joint Yes    Weakness      Physician: Bacilio Reyes Jr., MD    Visit Date: 10/16/2024    Physician Orders: PT Eval and Treat   Medical Diagnosis from Referral:   Evaluation Date: 8/28/2024  Authorization Period Expiration: 1/24/25  Plan of Care Expiration: 12/31/24  Progress Note Due: 9/31/24  Visit # / Visits authorized: 2/20  FOTO: 1/1     Precautions: Standard      Time In: 1200  Time Out: 1300  Total Appointment Time (timed & untimed codes): 60 minutes     Subjective     Pt reports: shoulder feeling better. States he can use it more. Performing hep.  He was not compliant with home exercise program.  Response to previous treatment: n/a  Functional change: n/a    Pain: 2/10  Location: right shoulder      Objective      Objective Measures updated at progress report unless specified.     Treatment     Kleber received the treatments listed below:      therapeutic exercises to develop strength and ROM for8  minutes including:  Pulleys flexion    manual therapy techniques: PROM were applied to the: RGHJ for 15 minutes, including:  PROM in all ranges as tolerated    neuromuscular re-education activities to improve: muscle re ed for 30 minutes. The following activities were included:  AAROM flexion, scaption, ER, IR in ranges as tolerated  Stick chest press 4 x 10  Stick flexion  3 way shoulder iso towel 10 x 10 sec x 2 rounds      therapeutic activities to improve functional performance for   minutes, including:      Patient Education and Home Exercises       Education provided:   - issued hep    Written Home Exercises Provided: yes. Exercises were reviewed and Kleber was able to demonstrate them prior to the end of the session.  Kleber demonstrated good  understanding of the education provided. See EMR under  Patient Instructions for exercises provided during therapy sessions    Assessment     ROM is improved. PROM flexion 125, ABD 90, ER 45. AROM in standing pt still unable. Will use pulleys at home passively and will work into AAROM.     Kleber Is progressing well towards his goals.   Pt prognosis is Fair.     Pt will continue to benefit from skilled outpatient physical therapy to address the deficits listed in the problem list box on initial evaluation, provide pt/family education and to maximize pt's level of independence in the home and community environment.     Pt's spiritual, cultural and educational needs considered and pt agreeable to plan of care and goals.     Anticipated barriers to physical therapy:     Short Term Goals: 8 weeks   Patient independent in initial hep  Full PROM  AROM 50% or better  Pt reports 20% improvement in function     Long Term Goals: 16-20 weeks   Pt independent in d/c hep  Full AROM  Strength 75% HHD compared to well side  Pt reports 75% improvement in function  Plan      Plan of care Certification: 8/28/2024 to 12/31/24.     Outpatient Physical Therapy 1-3 times weekly for 20 weeks to include the following interventions: Electrical Stimulation DN, Manual Therapy, Moist Heat/ Ice, Neuromuscular Re-ed, Patient Education, Self Care, Therapeutic Activities, and Therapeutic Exercise.      Murali Uriostegui, PT

## 2024-10-18 DIAGNOSIS — S82.844A: Primary | ICD-10-CM

## 2024-10-18 NOTE — PROGRESS NOTES
Neurosurgery  Established Patient    SUBJECTIVE:     History of Present Illness:  Donald Warren Abadie is a 69 y.o. male with alcohol abuse, afib, T2DM, RCC s/p nephrectomy who presents today for hospital follow up of SDH s/p mechanical fall. Patient was seen in hospital after he fell on way to neuropsych appt on 9/20/24 and CTH demonstrated very thin left tentorial acute SDH which was stable on repeat imaging. Patient continuing to drink per daughter, but is drinking less. He has had 3 beers today. He has neuropsych appt after our appt today. He denies headaches, seizures, confusion, focal weakness.    Review of patient's allergies indicates:   Allergen Reactions    No known drug allergies        Current Outpatient Medications   Medication Sig Dispense Refill    allopurinoL (ZYLOPRIM) 100 MG tablet TAKE 2 TABLETS(200 MG) BY MOUTH EVERY  tablet 0    cyanocobalamin (VITAMIN B-12) 1000 MCG tablet Take 1 tablet (1,000 mcg total) by mouth once daily.      diclofenac sodium (VOLTAREN) 1 % Gel Apply 2 g topically 3 (three) times daily as needed (Right knee - hand pain).      dronedarone (MULTAQ) 400 mg Tab Take 1 tablet (400 mg total) by mouth 2 (two) times daily with meals. (Patient taking differently: Take 400 mg by mouth once daily.) 60 tablet 11    ELIQUIS 5 mg Tab TAKE 1 TABLET BY MOUTH TWICE DAILY (Patient taking differently: Take 5 mg by mouth 2 (two) times daily.) 180 tablet 3    folic acid (FOLVITE) 1 MG tablet Take 1 tablet (1 mg total) by mouth once daily. 30 tablet 11    pantoprazole (PROTONIX) 40 MG tablet Take 1 tablet (40 mg total) by mouth once daily. 90 tablet 3    rOPINIRole (REQUIP) 1 MG tablet TAKE 1 TABLET(1 MG) BY MOUTH EVERY EVENING (Patient taking differently: Take 1 tablet by mouth daily as needed (restless leg).) 90 tablet 3    rosuvastatin (CRESTOR) 20 MG tablet TAKE 1 TABLET(20 MG) BY MOUTH EVERY DAY 90 tablet 3    sertraline (ZOLOFT) 100 MG tablet Take 1 tablet (100 mg total) by mouth  "once daily. 90 tablet 3    tamsulosin (FLOMAX) 0.4 mg Cap TAKE 1 CAPSULE(0.4 MG) BY MOUTH EVERY DAY (Patient taking differently: Take 0.4 mg by mouth every evening.) 90 capsule 3    VITAMIN B-1 100 MG tablet TAKE 1 TABLET BY MOUTH ONCE DAILY 30 tablet 2     No current facility-administered medications for this visit.       Past Medical History:   Diagnosis Date    A-fib     Alcohol abuse     Anxiety     Arthritis     Chronic gout     Diabetes mellitus     DM (diabetes mellitus) 11/16/2016    "boarderline, not taking any meds currently"    Fatty liver     Hyperlipidemia     Renal cell cancer 2014    S/P TKR (total knee replacement), right 06/27/2019     Past Surgical History:   Procedure Laterality Date    ABSCESS DRAINAGE      perirectal    COLONOSCOPY      ENDOSCOPIC ULTRASOUND OF UPPER GASTROINTESTINAL TRACT N/A 6/11/2024    Procedure: ULTRASOUND, UPPER GI TRACT, ENDOSCOPIC;  Surgeon: Mode Wood MD;  Location: [facility];  Service: Endoscopy;  Laterality: N/A;  6/7 portal-EUS in 7-10 days please, [facility] or Nicole-eliquis approved  Te 6/7-tt  6/10-precall complete-MS    HAND SURGERY Left     JOINT REPLACEMENT      knee replacement right knee    KIDNEY SURGERY      partial left kidney removal - [place]    PARTIAL NEPHRECTOMY Left August 2014    PERCUTANEOUS CRYOTHERAPY OF PERIPHERAL NERVE USING LIQUID NITROUS OXIDE IN CLOSED NEEDLE DEVICE Right 6/17/2019    Procedure: CRYOTHERAPY, NERVE, PERIPHERAL, PERCUTANEOUS, USING LIQUID NITROUS OXIDE IN CLOSED NEEDLE DEVICE-right knee iovera;  Surgeon: Donny Hair III, MD;  Location: [facility] CATH LAB;  Service: Pain Management;  Laterality: Right;    TOTAL KNEE ARTHROPLASTY Right 6/27/2019    Procedure: ARTHROPLASTY, KNEE, TOTAL-SAME DAY;  Surgeon: Mikael Huerta MD;  Location: [facility] OR [address] [place] [address];  Service: Orthopedics;  Laterality: Right;     Family History       Problem Relation (Age of Onset)    Alcohol abuse Brother    Alzheimer's disease Mother, Brother    " Cancer Father, Sister    Colon cancer Father    Colon polyps Brother    Diabetes Mother    Lung cancer Father, Sister          Social History     Socioeconomic History    Marital status: Single   Occupational History     Employer: [employer]   Tobacco Use    Smoking status: Never    Smokeless tobacco: Never   Substance and Sexual Activity    Alcohol use: Yes     Alcohol/week: 168.0 standard drinks of alcohol     Types: 168 Cans of beer per week     Comment: 12-24  beers daily    Drug use: No    Sexual activity: Not Currently     Partners: Female     Social Drivers of Health     Financial Resource Strain: Low Risk  (8/6/2024)    Overall Financial Resource Strain (CARDIA)     Difficulty of Paying Living Expenses: Not hard at all   Food Insecurity: No Food Insecurity (8/6/2024)    Hunger Vital Sign     Worried About Running Out of Food in the Last Year: Never true     Ran Out of Food in the Last Year: Never true   Transportation Needs: No Transportation Needs (8/6/2024)    PRAPARE - Transportation     Lack of Transportation (Medical): No     Lack of Transportation (Non-Medical): No   Physical Activity: Sufficiently Active (8/6/2024)    Exercise Vital Sign     Days of Exercise per Week: 5 days     Minutes of Exercise per Session: 60 min   Recent Concern: Physical Activity - Inactive (8/5/2024)    Exercise Vital Sign     Days of Exercise per Week: 0 days     Minutes of Exercise per Session: 0 min   Stress: No Stress Concern Present (8/6/2024)    [employer] [place] [employer] - Occupational Stress Questionnaire     Feeling of Stress : Only a little   Housing Stability: Low Risk  (8/6/2024)    Housing Stability Vital Sign     Unable to Pay for Housing in the Last Year: No     Homeless in the Last Year: No       Review of Systems    OBJECTIVE:     Vital Signs  Pain Score: 0-No pain  There is no height or weight on file to calculate BMI.    Neurosurgery Physical Exam  General: well developed, well  nourished, no distress.   Head: normocephalic, atraumatic  Neurologic: Alert and oriented. Thought content appropriate.  GCS: Motor: 6/Verbal: 5/Eyes: 4 GCS Total: 15  Mental Status: Awake, Alert, Oriented x 4  Language: No aphasia  Speech: No dysarthria  Cranial nerves: face symmetric, tongue midline, CN II-XII grossly intact.   Eyes: pupils equal, round, reactive to light with accommodation, EOMI.   Pulmonary: normal respirations, no signs of respiratory distress  Skin: Skin is warm, dry and intact.  Sensory: intact to light touch throughout  Motor Strength:Moves all extremities spontaneously with good tone.  Full strength upper and lower extremities. No abnormal movements seen.           Diagnostic Results:  I have personally reviewed imaging and agree with the findings    CTH wo contrast (10/2/24): Generalized cerebral volume loss with compensatory enlargement ventricles sulci and cisterns without hydrocephalus.  Slight prominence of the extra-axial space overlying the frontal lobes bilaterally similar to prior suggestive for compensatory enlargement related to volume loss versus subdural hygromas.  No evidence for acute intracranial hemorrhage.  Trace residual hyperdensity along the left tentorial leaflet suggestive for resolving small volume subdural hemorrhage. No new abnormal parenchymal attenuation.  Visualized paranasal sinuses and mastoid air cells are clear.    ASSESSMENT/PLAN:     69 y.o. male with alcohol abuse, afib, T2DM, RCC s/p nephrectomy who presents today for hospital follow up of SDH s/p mechanical fall.     CTH stable without acute hemorrhage, resolving SDH. Neuropsych appt to follow. I would like the patient to follow-up in clinic PRN. I have encouraged him to contact the clinic with any questions, concerns, or adverse clinical changes. He verbalized understanding.       Barbie Reyes PA-C  Neurosurgery   [facility]    Time spent on this encounter: 24 minutes. This  includes face-to-face time and non-face to face time preparing to see the patient (eg, review of tests), obtaining and/or reviewing separately obtained history, documenting clinical information in the electronic or other health record, independently interpreting results and communicating results to the patient/family/caregiver, or care coordinator     Note dictated with voice recognition software, please excuse any grammatical errors.

## 2024-10-22 ENCOUNTER — OFFICE VISIT (OUTPATIENT)
Dept: NEUROLOGY | Facility: CLINIC | Age: 69
End: 2024-10-22
Payer: MEDICARE

## 2024-10-22 DIAGNOSIS — F02.818 MAJOR NEUROCOGNITIVE DISORDER DUE TO MULTIPLE ETIOLOGIES WITH BEHAVIORAL DISTURBANCE: Primary | ICD-10-CM

## 2024-10-22 DIAGNOSIS — F10.20 ALCOHOL USE DISORDER, SEVERE, DEPENDENCE: ICD-10-CM

## 2024-10-23 ENCOUNTER — OUTPATIENT CASE MANAGEMENT (OUTPATIENT)
Dept: ADMINISTRATIVE | Facility: OTHER | Age: 69
End: 2024-10-23
Payer: MEDICARE

## 2024-10-24 ENCOUNTER — CLINICAL SUPPORT (OUTPATIENT)
Dept: REHABILITATION | Facility: HOSPITAL | Age: 69
End: 2024-10-24
Payer: MEDICARE

## 2024-10-24 DIAGNOSIS — M25.60 STIFFNESS IN JOINT: Primary | ICD-10-CM

## 2024-10-24 DIAGNOSIS — R53.1 WEAKNESS: ICD-10-CM

## 2024-10-24 PROCEDURE — 97112 NEUROMUSCULAR REEDUCATION: CPT | Mod: HCNC | Performed by: PHYSICAL THERAPIST

## 2024-10-24 PROCEDURE — 97140 MANUAL THERAPY 1/> REGIONS: CPT | Mod: HCNC | Performed by: PHYSICAL THERAPIST

## 2024-10-24 NOTE — PROGRESS NOTES
[facility]   Physical Therapy Treatment Note      Name: Donald Warren Abadie  Clinic Number: [id number]    Therapy Diagnosis:   Encounter Diagnoses   Name Primary?    Stiffness in joint Yes    Weakness      Physician: Bacilio Reyes Jr., MD    Visit Date: 10/24/2024    Physician Orders: PT Eval and Treat   Medical Diagnosis from Referral:   Evaluation Date: 8/28/2024  Authorization Period Expiration: 1/24/25  Plan of Care Expiration: 12/31/24  Progress Note Due: 11.30.24  Visit # / Visits authorized: 3/20  FOTO: 1/1     Precautions: Standard      Time In: 1100  Time Out: 1200  Total Appointment Time (timed & untimed codes): 60 minutes     Subjective     Pt reports: shoulder feeling better. States he can use it more. Performing hep.  He was not compliant with home exercise program.  Response to previous treatment: n/a  Functional change: n/a    Pain: 2/10  Location: right shoulder      Objective      Objective Measures updated at progress report unless specified.     Treatment     Kleber received the treatments listed below:      therapeutic exercises to develop strength and ROM for0  minutes including:  Pulleys flexion    manual therapy techniques: PROM were applied to the: RGHJ for 15 minutes, including:  PROM in all ranges as tolerated    neuromuscular re-education activities to improve: muscle re ed for 30 minutes. The following activities were included:  AAROM flexion, scaption, ER, IR in ranges as tolerated  Stick chest press 4 x 10  Stick flexion  3 way shoulder iso towel 10 x 10 sec x 2 rounds    AAROM chest press 4 x 10  AAROM supine flexion 40 x 10  Seated AAROM chest press 4 x 10      therapeutic activities to improve functional performance for   minutes, including:      Patient Education and Home Exercises       Education provided:   - issued hep    Written Home Exercises Provided: yes. Exercises were reviewed and Kleber was able to demonstrate them prior to the end of the  session.  Kleber demonstrated good  understanding of the education provided. See EMR under Patient Instructions for exercises provided during therapy sessions    Assessment     ROM is improved. PROM flexion 150, ABD 90, ER at 90 is 90. AROM in standing pt still unable but does well with AAROM in supine. Continue to progress JESSICA Shahid Is progressing well towards his goals.   Pt prognosis is Fair.     Pt will continue to benefit from skilled outpatient physical therapy to address the deficits listed in the problem list box on initial evaluation, provide pt/family education and to maximize pt's level of independence in the home and community environment.     Pt's spiritual, cultural and educational needs considered and pt agreeable to plan of care and goals.     Anticipated barriers to physical therapy:     Short Term Goals: 8 weeks   Patient independent in initial hep  Full PROM  AROM 50% or better  Pt reports 20% improvement in function     Long Term Goals: 16-20 weeks   Pt independent in d/c hep  Full AROM  Strength 75% HHD compared to well side  Pt reports 75% improvement in function  Plan      Plan of care Certification: 8/28/2024 to 12/31/24.     Outpatient Physical Therapy 1-3 times weekly for 20 weeks to include the following interventions: Electrical Stimulation DN, Manual Therapy, Moist Heat/ Ice, Neuromuscular Re-ed, Patient Education, Self Care, Therapeutic Activities, and Therapeutic Exercise.      Murali Uriostegui, PT

## 2024-10-30 PROBLEM — F02.818 MAJOR NEUROCOGNITIVE DISORDER DUE TO MULTIPLE ETIOLOGIES WITH BEHAVIORAL DISTURBANCE: Status: ACTIVE | Noted: 2024-10-03

## 2024-10-31 ENCOUNTER — PATIENT OUTREACH (OUTPATIENT)
Dept: NEUROLOGY | Facility: CLINIC | Age: 69
End: 2024-10-31
Payer: MEDICARE

## 2024-10-31 ENCOUNTER — OFFICE VISIT (OUTPATIENT)
Dept: NEUROLOGY | Facility: CLINIC | Age: 69
End: 2024-10-31
Payer: MEDICARE

## 2024-10-31 ENCOUNTER — TELEPHONE (OUTPATIENT)
Dept: CARDIOLOGY | Facility: CLINIC | Age: 69
End: 2024-10-31
Payer: MEDICARE

## 2024-10-31 DIAGNOSIS — F10.20 ALCOHOL USE DISORDER, SEVERE, DEPENDENCE: ICD-10-CM

## 2024-10-31 DIAGNOSIS — F02.818 MAJOR NEUROCOGNITIVE DISORDER DUE TO MULTIPLE ETIOLOGIES WITH BEHAVIORAL DISTURBANCE: Primary | ICD-10-CM

## 2024-10-31 DIAGNOSIS — S06.5XAA SUBDURAL HEMATOMA: ICD-10-CM

## 2024-10-31 PROCEDURE — 99499 UNLISTED E&M SERVICE: CPT | Mod: HCNC,S$GLB,, | Performed by: CLINICAL NEUROPSYCHOLOGIST

## 2024-10-31 NOTE — PROGRESS NOTES
NEUROPSYCHOLOGICAL EVALUATION FEEDBACK    Donald Warren Abadie attended a feedback session today and was accompanied by his daughter.  We discussed the results of the neuropsychological evaluation and I gave time to discuss questions and concerns. For full evaluation details, please see the note from this provider dated 10/22/2024. A copy of the report was provided via [employer] but also printed and provided to them today. 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

## 2024-11-01 DIAGNOSIS — I95.1 ORTHOSTASIS: Primary | ICD-10-CM

## 2024-11-04 ENCOUNTER — PATIENT OUTREACH (OUTPATIENT)
Dept: NEUROLOGY | Facility: CLINIC | Age: 69
End: 2024-11-04
Payer: MEDICARE

## 2024-11-04 ENCOUNTER — TELEPHONE (OUTPATIENT)
Dept: CARDIOLOGY | Facility: CLINIC | Age: 69
End: 2024-11-04
Payer: MEDICARE

## 2024-11-04 NOTE — PROGRESS NOTES
SW called pt's daughter/CG to follow up on previous call.  CG answered the phone and said asked if she could call SW back because she is busy.  SW agreed and will remain available.

## 2024-11-05 NOTE — TELEPHONE ENCOUNTER
Regarding getting appt with Dr Tolentino - Called pt's daughter and left a msg on her voice mail telling her to keep Mr Abadie's appt with laurie on 11/7 and later on we can make him a f/u appt with Dr Tolentino. I also called the 2nd # and spoke with pt's son-in-law about same thing - He will pass on the msg to pt and daughter.

## 2024-11-05 NOTE — PROGRESS NOTES
General Cardiology Clinic Note  Last Clinic Visit: 11/4/22 with Dr. Tolentino   General Cardiologist: Dr. Tolentino    HPI:     Donald Warren Abadie is a 69 y.o. , who presents for annual visit.    PROBLEM LIST:  HTN  HLD  HFrEF (EF 45-50%)  pAF  Aortic atherosclerosis w/o aneurysm   Alcohol abuse    Interval HPI:   He presents with his daughter for follow-up. His daughter (an RN) reports that he experienced some orthostatic symptoms recently, but has been doing better over the last week. BP today is normal. He unfortunately is currently drinking heavily, has been diagnosed with alcohol-related dementia, and has had several mechanical falls in the past 6 months prompting ER visits. Most recent fall was in September where he did hit his head. He presented to the ED where CT head revealed a very thin subdural hematoma without mass effect. No complications or interventions from this. His daughter also thinks he may be more short of breath with walking recently, which prompted her concern that his increased alcohol consumption is damaging his heart. The patient currently denies chest pain, palpitations, PND/orthopnea, edema, syncope, or claudication. Weight stable. He used to be able to mow his lawn without difficulty, but injured his shoulder and ankle during one of his recent falls, so has not been able to do this in a while. He is otherwise mostly sedentary. Last FLP done in November 2022: at that time, LDL was 37. He is on rosuvastatin 20mg daily. Liver enzymes have been normal on recent labs. Kidney function is stable, last Cr 1.1.     11/2022 HPI (Dr. Tolentino)  When we saw him last reiterated proper flecainide and metoprolol dosing, he continues to report that he does not want to take metoprolol on a regular basis.  He is not however taking flecainide on a regular basis as well.  He reports that when he has an episode of atrial fibrillation he takes metoprolol, and then takes a flecainide after this.  Continues  to have breakthrough episodes, and reports that they are fairly distressing, he reports underlying anxiety and this has exacerbated his anxiety disorder.  Mildly tangential on exam today.  When discussing alcohol he reports that he has had AFib without drinking and with drinking so he does not feel that there is a relationship.  He had quit drinking in the past, but it appears that he is drinking again.  Denies any chest pain, no syncope, no presyncope, no low blood pressure, no high blood pressure. Compliant with eliquis     2/2022 HPI (Dr. Tolentino)  He was doing reasonably well up until last week when he began to have low blood pressure, and unclear heart rate.  Also endorsed generalized constitutional symptoms, malaise, fatigue, fever to 101, mild cough, and some shortness of breath.  Denies being tested for COVID.  Also some lightheadedness, denies any orthopnea, occasional chest pain, decreased p.o. intake, no bleeding, no change in bowel habits.  Has fever has resolved, and some of his other symptoms are improving, but he continues to report fatigue, dyspnea on exertion.  On my exam today noted to be mildly tachycardic and irregular.  He has changed from p.r.n. flecainide when we saw him last to 100 b.i.d. unclear when he did this, continues to take Eliquis compliant with this.  Stopped metoprolol for again unclear reasons.       7/2020 HPI (Dr. Tolentino)  Since we saw him last we check cholesterol, and LDL nicely down to 50s on Crestor.  CMP with AST ALT within normal limits.  FibroScan by outside provider with steatosis.  He reports that atrial fibrillation improving, now down to only one episode a month, episodes triggered by gas and iced tea, relieved by belching.  He was taking flecainide and metoprolol daily, but now taking p.r.n..  Always takes metoprolol prior to taking flecainide, only able to take one/2 metoprolol due to bradycardia.  Continuing to not drink.  Eliquis was noted to be expensive, but  still prefers this over warfarin.    1/2020 HPI (Dr. Tolentino)   He has been doing reasonably well since we saw him last 5 months ago, still reports occasional paroxysmal atrial fibrillation, attributes it to when he forgets to take medications.  D He still takes metoprolol intermittently, afraid to take more due to bradycardia in the past.  Discussed the synergistic role of metoprolol with flecainide today.  Denies any focal neurological deficits.  He is doing better from a rehab standpoint of his right knee, tells the story today of some scar tissue breaking on the front of his knee when he jumped off of his truck, and knee has been moving better since then.    He also reports stopping fenofibrate for elevated triglycerides per our discussion.  Accidentally stopped Crestor the same time, has only been on Crestor back for one week    8/2019 HPI (Dr. Tolentino)  He had an episodewhere he felt his blood pressure being elevated, and took several metoprolol, on top of diltiazem which we had recently started, and ultimately developed hypotension was admitted to the ER found to have bradycardia, and then had hypotensive PEA cardiac arrest, quick return of sinus rhythm,  It was felt that arrest was in the setting of multiple medications, alcohol.  Since that time he has stopped drinkin No recurrent cardiac arrest.  AFib burden has dramatically decreased now that he no longer drinks.  Medication-wise he states he is currently taking flecainide 100 b.i.d. along with metoprolol p.r.n. with episodes of AFib.  He has an episode of AFib every few weeks.   We decreased his Crestor from 20 mg to 10 mg with an LDL of 11 and some mild transaminitis, repeat lipids in June showed that LDL went from 11 to 110.  No chest pain.    3/2019 HPI (Dr. Tolentino)  He has a several year history of paroxysmal atrial fibrillation, very symptomatic, with palpitations, tachycardia, and has been seen in the ER in the past for it.  He is followed with  "Dr. Giang who he saw last year, and discussed antiarrhythmic therapy versus ablation, elected to pursue antiarrhythmic therapy given severe symptomatic nature of atrial fibrillation at that time.  Also with borderline age, elected to pursue anticoagulation, and currently on apixaban 5 b.i.d., he reports tolerating anticoagulation well, denies any falls, no bleeding, no hematemesis, no hematochezia.  Given alcohol abuse, discussed treating this prior to ablation.  From atrial fibrillation standpoint he reports approximately 1 episode a month, which last anywhere from 15 min to several hours.  He currently is on flecainide 100 b.i.d., and reports compliance with this medication, but also is on metoprolol succinate 25 daily, and only reports intermittent compliance with this medication due to fatigue.  In addition to succinate 25 daily he also has a prescription for 25 tartrate p.r.n., which he takes when he goes into episodes of atrial fibrillation.   He reports a persistent difficult to control hypertriglyceridemia, and currently on Crestor 20 which was recently increased as well as Lovaza and fenofibrate.  Most recent LDL 11.  He previously has discussed detox therapy with Psychiatry, however they elected for inpatient rehab given cardiac issues, and he declined.      Surgical: Reviewed, as below.  Family: Reviewed, as below.   Social: Reviewed, as below.    ROS:    Pertinent ROS included in HPI and below.  PMH:     Past Medical History:   Diagnosis Date    A-fib     Alcohol abuse     Anxiety     Arthritis     Chronic gout     Diabetes mellitus     DM (diabetes mellitus) 11/16/2016    "boarderline, not taking any meds currently"    Fatty liver     Hyperlipidemia     Renal cell cancer 2014    S/P TKR (total knee replacement), right 06/27/2019     Past Surgical History:   Procedure Laterality Date    ABSCESS DRAINAGE      perirectal    COLONOSCOPY      ENDOSCOPIC ULTRASOUND OF UPPER GASTROINTESTINAL TRACT N/A " 6/11/2024    Procedure: ULTRASOUND, UPPER GI TRACT, ENDOSCOPIC;  Surgeon: Mode Wood MD;  Location: [facility];  Service: Endoscopy;  Laterality: N/A;  6/7 portal-EUS in 7-10 days please, [facility] [place] [facility] [place]-eliquis approved  Te 6/7-tt  6/10-precall complete-MS    HAND SURGERY Left     JOINT REPLACEMENT      knee replacement right knee    KIDNEY SURGERY      partial left kidney removal - [place]    PARTIAL NEPHRECTOMY Left August 2014    PERCUTANEOUS CRYOTHERAPY OF PERIPHERAL NERVE USING LIQUID NITROUS OXIDE IN CLOSED NEEDLE DEVICE Right 6/17/2019    Procedure: CRYOTHERAPY, NERVE, PERIPHERAL, PERCUTANEOUS, USING LIQUID NITROUS OXIDE IN CLOSED NEEDLE DEVICE-right knee iovera;  Surgeon: Donny Hair III, MD;  Location: [facility] CATH LAB;  Service: Pain Management;  Laterality: Right;    TOTAL KNEE ARTHROPLASTY Right 6/27/2019    Procedure: ARTHROPLASTY, KNEE, TOTAL-SAME DAY;  Surgeon: Mikael Huerta MD;  Location: [facility] OR [address] [place] [address];  Service: Orthopedics;  Laterality: Right;     Allergies:     Review of patient's allergies indicates:   Allergen Reactions    No known drug allergies      Medications:     Current Outpatient Medications on File Prior to Visit   Medication Sig Dispense Refill    allopurinoL (ZYLOPRIM) 100 MG tablet TAKE 2 TABLETS(200 MG) BY MOUTH EVERY  tablet 0    cyanocobalamin (VITAMIN B-12) 1000 MCG tablet Take 1 tablet (1,000 mcg total) by mouth once daily.      diclofenac sodium (VOLTAREN) 1 % Gel Apply 2 g topically 3 (three) times daily as needed (Right knee - hand pain).      dronedarone (MULTAQ) 400 mg Tab Take 1 tablet (400 mg total) by mouth 2 (two) times daily with meals. (Patient taking differently: Take 400 mg by mouth once daily.) 60 tablet 11    ELIQUIS 5 mg Tab TAKE 1 TABLET BY MOUTH TWICE DAILY (Patient taking differently: Take 5 mg by mouth 2 (two) times daily.) 180 tablet 3    pantoprazole (PROTONIX) 40 MG tablet Take 1 tablet (40 mg total) by mouth once daily. 90  "tablet 3    rOPINIRole (REQUIP) 1 MG tablet TAKE 1 TABLET(1 MG) BY MOUTH EVERY EVENING (Patient taking differently: Take 1 tablet by mouth daily as needed (restless leg).) 90 tablet 3    rosuvastatin (CRESTOR) 20 MG tablet TAKE 1 TABLET(20 MG) BY MOUTH EVERY DAY 90 tablet 3    sertraline (ZOLOFT) 100 MG tablet Take 1 tablet (100 mg total) by mouth once daily. 90 tablet 3    tamsulosin (FLOMAX) 0.4 mg Cap TAKE 1 CAPSULE(0.4 MG) BY MOUTH EVERY DAY (Patient taking differently: Take 0.4 mg by mouth every evening.) 90 capsule 3    VITAMIN B-1 100 MG tablet TAKE 1 TABLET BY MOUTH ONCE DAILY 30 tablet 2    folic acid (FOLVITE) 1 MG tablet Take 1 tablet (1 mg total) by mouth once daily. 30 tablet 11     No current facility-administered medications on file prior to visit.     Social History:     Social History     Tobacco Use    Smoking status: Never    Smokeless tobacco: Never   Substance Use Topics    Alcohol use: Yes     Alcohol/week: 168.0 standard drinks of alcohol     Types: 168 Cans of beer per week     Comment: 12-24  beers daily     Family History:     Family History   Problem Relation Name Age of Onset    Lung cancer Father      Colon cancer Father      Cancer Father          lung cancer    Diabetes Mother      Alzheimer's disease Mother      Lung cancer Sister      Cancer Sister          lung    Colon polyps Brother      Alcohol abuse Brother      Alzheimer's disease Brother      Cirrhosis Neg Hx       Physical Exam:   /66 (Patient Position: Sitting)   Pulse 70   Ht 5' 6" (1.676 m)   Wt 77.5 kg (170 lb 13.7 oz)   SpO2 100%   BMI 27.58 kg/m²      Physical Exam  Constitutional:       Appearance: Normal appearance.   HENT:      Head: Normocephalic.      Mouth/Throat:      Mouth: Mucous membranes are moist.   Neck:      Vascular: No carotid bruit.   Cardiovascular:      Rate and Rhythm: Normal rate and regular rhythm.      Pulses:           Carotid pulses are 2+ on the right side and 2+ on the left side.    "    Radial pulses are 2+ on the right side and 2+ on the left side.      Heart sounds: Normal heart sounds. No murmur heard.  Pulmonary:      Effort: Pulmonary effort is normal.      Breath sounds: Normal breath sounds.   Musculoskeletal:      Right lower leg: No edema.      Left lower leg: No edema.   Skin:     General: Skin is warm and dry.   Neurological:      Mental Status: He is alert and oriented to person, place, and time.         Labs:     Blood Tests:  Lab Results   Component Value Date     (H) 04/22/2024     (L) 10/03/2024    K 3.7 10/03/2024    CL 99 10/03/2024    CO2 18 (L) 10/03/2024    BUN 9 10/03/2024    CREATININE 1.1 10/03/2024     10/03/2024    HGBA1C 5.9 (H) 02/29/2024    MG 1.5 (L) 09/12/2024    AST 43 (H) 10/03/2024    ALT 23 10/03/2024    ALBUMIN 4.2 10/03/2024    PROT 7.3 10/03/2024    BILITOT 0.4 10/03/2024    WBC 7.68 09/20/2024    HGB 12.8 (L) 09/20/2024    HCT 38.6 (L) 09/20/2024    HCT 46 05/02/2024    MCV 97 09/20/2024     09/20/2024    INR 1.1 09/20/2024    TSH 2.089 10/29/2023       Lab Results   Component Value Date    CHOL 123 11/11/2022    HDL 43 11/11/2022    TRIG 213 (H) 11/11/2022       Lab Results   Component Value Date    LDLCALC 37.4 (L) 11/11/2022       Lab Results   Component Value Date    TSH 2.089 10/29/2023       Lab Results   Component Value Date    HGBA1C 5.9 (H) 02/29/2024         Imaging:     Echocardiogram  TTE 11/2022  The left ventricle is normal in size with mildly decreased systolic function. The estimated ejection fraction is 45-50%.  There is mild left ventricular global hypokinesis.  Normal right ventricular size with normal right ventricular systolic function.  Grade I left ventricular diastolic dysfunction.  The estimated PA systolic pressure is 33 mmHg.  Normal central venous pressure (3 mmHg).    TTE 11/2021  The left ventricle is normal in size with mildly reduced systolic function. The estimated ejection fraction is  45-50%.  Normal right ventricular size with low normal right ventricular systolic function.  Grade I left ventricular diastolic dysfunction.  Moderate left atrial enlargement.  Normal central venous pressure (3 mmHg).    TTE 10/2018    1 - Normal left ventricular systolic function (EF 60-65%).     2 - Biatrial enlargement.     3 - No wall motion abnormalities.     4 - Quantitatively measured LV function is 67%.     5 - Indeterminate LV diastolic function.     6 - Normal right ventricular systolic function .     7 - The estimated PA systolic pressure is greater than 24 mmHg.     8 - Mild tricuspid regurgitation.     TTE 2014    1 - Normal left ventricular systolic function (EF 60-65%).     2 - Normal left ventricular diastolic function.     3 - Trivial mitral regurgitation.     4 - Mild left atrial enlargement.     5 - Mild pulmonic regurgitation.     6 - Normal right ventricular systolic function .     7 - Trivial tricuspid regurgitation.     Stress testing  YOUNG 2013    1 - Normal left ventricular function (EF 65%).     2 - Normal diastolic function.   No evidence of stress induced myocardial ischemia.     Cath Lab  None    Other  CT A/P 2024  Calcified plaque in the aorta and mesenteric vessels. No aneurysm.       EK24 - Normal sinus rhythm with sinus arrhythmia, 65 bpm   Normal ECG     Assessment:     1. Paroxysmal atrial fibrillation    2. Hyperlipidemia associated with type 2 diabetes mellitus    3. Aortic atherosclerosis    4. Primary hypertension    5. Heart failure with mildly reduced ejection fraction (HFmrEF)        Plan:     Heart failure with mildly reduced ejection fraction (HFmrEF)  Paroxysmal atrial fibrillation  He has not had any ischemic workup since his EF became mildly reduced sometime in .  It's possible these were found during bouts of AF, however on review, his EF in 2017 was normal while in AF.  Will update TTE with same-day EKG as he is paroxysmal. If EF still mildly  reduced, would start GDMT and proceed with ischemic eval.  Also would like him to be seen in EP soon to discuss frequent falls while on Eliquis.  I of course encourage alcohol cessation. He/his daughter are currently working with social work and neuropsychology.    Hyperlipidemia associated with type 2 diabetes mellitus  Aortic atherosclerosis  Will update labs. Continue statin therapy.    Primary hypertension  BP well-controlled. Continue current medication regimen. Encourage low-sodium diet.      Follow up based on results of TTE.    Signed:  Lauren Vlosich, PA-C [facility]     11/7/2024     Follow-up:     Future Appointments   Date Time Provider Department Center   11/14/2024  8:45 AM EKG, APPT [facility] EKG [facility]   11/14/2024  9:10 AM LAB, APPOINTMENT [facility] LAB VNP [facility]   11/14/2024 10:15 AM ECHO, [facility] ECHOSTR [facility]   11/21/2024  2:30 PM Mesha Callejas AU.D [facility] AUDIO [facility]   11/21/2024  3:00 PM Murali Rapp MD [facility] ENT [facility]   12/4/2024  9:00 AM Murali Uriostegui, PT EL OP [facility] [place]   12/6/2024 11:00 AM [facility] OIC-US1 MASTER [facility] ULTR [facility]   12/6/2024 11:50 AM LAB, APPOINTMENT [facility] INTMED [facility] LAB IM [facility] PCW   1/9/2025 10:00 AM Mode Wood MD [facility] AENDGAS [facility]

## 2024-11-06 ENCOUNTER — TELEPHONE (OUTPATIENT)
Dept: CARDIOLOGY | Facility: CLINIC | Age: 69
End: 2024-11-06
Payer: MEDICARE

## 2024-11-07 ENCOUNTER — OFFICE VISIT (OUTPATIENT)
Dept: CARDIOLOGY | Facility: CLINIC | Age: 69
End: 2024-11-07
Payer: MEDICARE

## 2024-11-07 VITALS
OXYGEN SATURATION: 100 % | BODY MASS INDEX: 27.46 KG/M2 | HEIGHT: 66 IN | HEART RATE: 70 BPM | SYSTOLIC BLOOD PRESSURE: 124 MMHG | DIASTOLIC BLOOD PRESSURE: 66 MMHG | WEIGHT: 170.88 LBS

## 2024-11-07 DIAGNOSIS — I10 PRIMARY HYPERTENSION: ICD-10-CM

## 2024-11-07 DIAGNOSIS — I48.0 PAROXYSMAL ATRIAL FIBRILLATION: Primary | ICD-10-CM

## 2024-11-07 DIAGNOSIS — E78.5 HYPERLIPIDEMIA ASSOCIATED WITH TYPE 2 DIABETES MELLITUS: Chronic | ICD-10-CM

## 2024-11-07 DIAGNOSIS — I50.22 HEART FAILURE WITH MILDLY REDUCED EJECTION FRACTION (HFMREF): ICD-10-CM

## 2024-11-07 DIAGNOSIS — E11.69 HYPERLIPIDEMIA ASSOCIATED WITH TYPE 2 DIABETES MELLITUS: Chronic | ICD-10-CM

## 2024-11-07 DIAGNOSIS — I70.0 AORTIC ATHEROSCLEROSIS: ICD-10-CM

## 2024-11-07 PROCEDURE — 3078F DIAST BP <80 MM HG: CPT | Mod: HCNC,CPTII,S$GLB,

## 2024-11-07 PROCEDURE — 1100F PTFALLS ASSESS-DOCD GE2>/YR: CPT | Mod: HCNC,CPTII,S$GLB,

## 2024-11-07 PROCEDURE — 3074F SYST BP LT 130 MM HG: CPT | Mod: HCNC,CPTII,S$GLB,

## 2024-11-07 PROCEDURE — 99999 PR PBB SHADOW E&M-EST. PATIENT-LVL III: CPT | Mod: PBBFAC,HCNC,,

## 2024-11-07 PROCEDURE — 3008F BODY MASS INDEX DOCD: CPT | Mod: HCNC,CPTII,S$GLB,

## 2024-11-07 PROCEDURE — 99214 OFFICE O/P EST MOD 30 MIN: CPT | Mod: HCNC,S$GLB,,

## 2024-11-07 PROCEDURE — 1126F AMNT PAIN NOTED NONE PRSNT: CPT | Mod: HCNC,CPTII,S$GLB,

## 2024-11-07 PROCEDURE — 1159F MED LIST DOCD IN RCRD: CPT | Mod: HCNC,CPTII,S$GLB,

## 2024-11-07 PROCEDURE — 3044F HG A1C LEVEL LT 7.0%: CPT | Mod: HCNC,CPTII,S$GLB,

## 2024-11-07 PROCEDURE — 3288F FALL RISK ASSESSMENT DOCD: CPT | Mod: HCNC,CPTII,S$GLB,

## 2024-11-08 ENCOUNTER — PATIENT MESSAGE (OUTPATIENT)
Dept: NEUROLOGY | Facility: CLINIC | Age: 69
End: 2024-11-08
Payer: MEDICARE

## 2024-11-08 DIAGNOSIS — F10.20 ALCOHOL USE DISORDER, SEVERE, DEPENDENCE: Primary | ICD-10-CM

## 2024-11-13 ENCOUNTER — PATIENT MESSAGE (OUTPATIENT)
Dept: ADMINISTRATIVE | Facility: OTHER | Age: 69
End: 2024-11-13
Payer: MEDICARE

## 2024-11-13 ENCOUNTER — OUTPATIENT CASE MANAGEMENT (OUTPATIENT)
Dept: ADMINISTRATIVE | Facility: OTHER | Age: 69
End: 2024-11-13
Payer: MEDICARE

## 2024-11-14 ENCOUNTER — HOSPITAL ENCOUNTER (INPATIENT)
Facility: HOSPITAL | Age: 69
LOS: 6 days | Discharge: HOME HEALTH | DRG: 897 | End: 2024-11-20
Attending: EMERGENCY MEDICINE | Admitting: EMERGENCY MEDICINE
Payer: MEDICARE

## 2024-11-14 DIAGNOSIS — F10.939 ALCOHOL WITHDRAWAL SYNDROME WITH COMPLICATION: ICD-10-CM

## 2024-11-14 DIAGNOSIS — R07.9 CHEST PAIN: ICD-10-CM

## 2024-11-14 DIAGNOSIS — I48.0 PAROXYSMAL ATRIAL FIBRILLATION: ICD-10-CM

## 2024-11-14 DIAGNOSIS — R00.0 TACHYCARDIA: ICD-10-CM

## 2024-11-14 DIAGNOSIS — Z91.89 AT RISK FOR LONG QT SYNDROME: ICD-10-CM

## 2024-11-14 DIAGNOSIS — I48.91 ATRIAL FIBRILLATION: ICD-10-CM

## 2024-11-14 DIAGNOSIS — F10.931 ALCOHOL WITHDRAWAL SYNDROME, WITH DELIRIUM: Primary | ICD-10-CM

## 2024-11-14 DIAGNOSIS — R06.02 SOB (SHORTNESS OF BREATH): ICD-10-CM

## 2024-11-14 LAB
ALBUMIN SERPL BCP-MCNC: 4 G/DL (ref 3.5–5.2)
ALP SERPL-CCNC: 74 U/L (ref 40–150)
ALT SERPL W/O P-5'-P-CCNC: 27 U/L (ref 10–44)
ANION GAP SERPL CALC-SCNC: 12 MMOL/L (ref 8–16)
ANION GAP SERPL CALC-SCNC: 14 MMOL/L (ref 8–16)
AST SERPL-CCNC: 67 U/L (ref 10–40)
BACTERIA #/AREA URNS AUTO: ABNORMAL /HPF
BASOPHILS # BLD AUTO: 0.06 K/UL (ref 0–0.2)
BASOPHILS NFR BLD: 0.6 % (ref 0–1.9)
BILIRUB SERPL-MCNC: 1.6 MG/DL (ref 0.1–1)
BILIRUB UR QL STRIP: NEGATIVE
BNP SERPL-MCNC: 371 PG/ML (ref 0–99)
BUN SERPL-MCNC: 6 MG/DL (ref 8–23)
BUN SERPL-MCNC: 7 MG/DL (ref 8–23)
CALCIUM SERPL-MCNC: 8.3 MG/DL (ref 8.7–10.5)
CALCIUM SERPL-MCNC: 8.7 MG/DL (ref 8.7–10.5)
CHLORIDE SERPL-SCNC: 90 MMOL/L (ref 95–110)
CHLORIDE SERPL-SCNC: 93 MMOL/L (ref 95–110)
CLARITY UR REFRACT.AUTO: ABNORMAL
CO2 SERPL-SCNC: 18 MMOL/L (ref 23–29)
CO2 SERPL-SCNC: 20 MMOL/L (ref 23–29)
COLOR UR AUTO: YELLOW
CREAT SERPL-MCNC: 1.4 MG/DL (ref 0.5–1.4)
CREAT SERPL-MCNC: 1.6 MG/DL (ref 0.5–1.4)
DIFFERENTIAL METHOD BLD: ABNORMAL
EOSINOPHIL # BLD AUTO: 0.1 K/UL (ref 0–0.5)
EOSINOPHIL NFR BLD: 0.6 % (ref 0–8)
ERYTHROCYTE [DISTWIDTH] IN BLOOD BY AUTOMATED COUNT: 13.1 % (ref 11.5–14.5)
EST. GFR  (NO RACE VARIABLE): 46.1 ML/MIN/1.73 M^2
EST. GFR  (NO RACE VARIABLE): 54.1 ML/MIN/1.73 M^2
ETHANOL SERPL-MCNC: <10 MG/DL
GLUCOSE SERPL-MCNC: 130 MG/DL (ref 70–110)
GLUCOSE SERPL-MCNC: 99 MG/DL (ref 70–110)
GLUCOSE UR QL STRIP: ABNORMAL
HCT VFR BLD AUTO: 43 % (ref 40–54)
HGB BLD-MCNC: 15.1 G/DL (ref 14–18)
HGB UR QL STRIP: ABNORMAL
HYALINE CASTS UR QL AUTO: 7 /LPF
IMM GRANULOCYTES # BLD AUTO: 0.04 K/UL (ref 0–0.04)
IMM GRANULOCYTES NFR BLD AUTO: 0.4 % (ref 0–0.5)
KETONES UR QL STRIP: NEGATIVE
LEUKOCYTE ESTERASE UR QL STRIP: NEGATIVE
LIPASE SERPL-CCNC: 27 U/L (ref 4–60)
LYMPHOCYTES # BLD AUTO: 1.7 K/UL (ref 1–4.8)
LYMPHOCYTES NFR BLD: 16.8 % (ref 18–48)
MAGNESIUM SERPL-MCNC: 1.3 MG/DL (ref 1.6–2.6)
MCH RBC QN AUTO: 31.9 PG (ref 27–31)
MCHC RBC AUTO-ENTMCNC: 35.1 G/DL (ref 32–36)
MCV RBC AUTO: 91 FL (ref 82–98)
MICROSCOPIC COMMENT: ABNORMAL
MONOCYTES # BLD AUTO: 1 K/UL (ref 0.3–1)
MONOCYTES NFR BLD: 9.8 % (ref 4–15)
NEUTROPHILS # BLD AUTO: 7.1 K/UL (ref 1.8–7.7)
NEUTROPHILS NFR BLD: 71.8 % (ref 38–73)
NITRITE UR QL STRIP: NEGATIVE
NRBC BLD-RTO: 0 /100 WBC
OHS QRS DURATION: 82 MS
OHS QRS DURATION: 82 MS
OHS QRS DURATION: 86 MS
OHS QRS DURATION: 98 MS
OHS QTC CALCULATION: 430 MS
OHS QTC CALCULATION: 474 MS
OHS QTC CALCULATION: 481 MS
OHS QTC CALCULATION: 499 MS
OSMOLALITY SERPL: 264 MOSM/KG (ref 280–300)
OSMOLALITY UR: 409 MOSM/KG (ref 50–1200)
PH UR STRIP: 6 [PH] (ref 5–8)
PLATELET # BLD AUTO: 159 K/UL (ref 150–450)
PMV BLD AUTO: 8.7 FL (ref 9.2–12.9)
POCT GLUCOSE: 119 MG/DL (ref 70–110)
POCT GLUCOSE: 157 MG/DL (ref 70–110)
POCT GLUCOSE: 99 MG/DL (ref 70–110)
POTASSIUM SERPL-SCNC: 3.7 MMOL/L (ref 3.5–5.1)
POTASSIUM SERPL-SCNC: 4.3 MMOL/L (ref 3.5–5.1)
PROT SERPL-MCNC: 7 G/DL (ref 6–8.4)
PROT UR QL STRIP: ABNORMAL
RBC # BLD AUTO: 4.74 M/UL (ref 4.6–6.2)
RBC #/AREA URNS AUTO: 15 /HPF (ref 0–4)
SODIUM SERPL-SCNC: 122 MMOL/L (ref 136–145)
SODIUM SERPL-SCNC: 125 MMOL/L (ref 136–145)
SODIUM UR-SCNC: 30 MMOL/L (ref 20–250)
SP GR UR STRIP: 1.02 (ref 1–1.03)
SQUAMOUS #/AREA URNS AUTO: 0 /HPF
TROPONIN I SERPL DL<=0.01 NG/ML-MCNC: 0.01 NG/ML (ref 0–0.03)
TROPONIN I SERPL DL<=0.01 NG/ML-MCNC: 0.01 NG/ML (ref 0–0.03)
URN SPEC COLLECT METH UR: ABNORMAL
WBC # BLD AUTO: 9.95 K/UL (ref 3.9–12.7)
WBC #/AREA URNS AUTO: 5 /HPF (ref 0–5)

## 2024-11-14 PROCEDURE — 63600175 PHARM REV CODE 636 W HCPCS: Mod: HCNC

## 2024-11-14 PROCEDURE — 83930 ASSAY OF BLOOD OSMOLALITY: CPT | Mod: HCNC

## 2024-11-14 PROCEDURE — 83880 ASSAY OF NATRIURETIC PEPTIDE: CPT | Mod: HCNC | Performed by: EMERGENCY MEDICINE

## 2024-11-14 PROCEDURE — 85025 COMPLETE CBC W/AUTO DIFF WBC: CPT | Mod: HCNC | Performed by: EMERGENCY MEDICINE

## 2024-11-14 PROCEDURE — 83690 ASSAY OF LIPASE: CPT | Mod: HCNC

## 2024-11-14 PROCEDURE — 93010 ELECTROCARDIOGRAM REPORT: CPT | Mod: HCNC,,, | Performed by: INTERNAL MEDICINE

## 2024-11-14 PROCEDURE — 80048 BASIC METABOLIC PNL TOTAL CA: CPT | Mod: HCNC

## 2024-11-14 PROCEDURE — 25000003 PHARM REV CODE 250

## 2024-11-14 PROCEDURE — 93005 ELECTROCARDIOGRAM TRACING: CPT | Mod: HCNC

## 2024-11-14 PROCEDURE — 83735 ASSAY OF MAGNESIUM: CPT | Mod: HCNC | Performed by: EMERGENCY MEDICINE

## 2024-11-14 PROCEDURE — 84484 ASSAY OF TROPONIN QUANT: CPT | Mod: HCNC | Performed by: EMERGENCY MEDICINE

## 2024-11-14 PROCEDURE — 93010 ELECTROCARDIOGRAM REPORT: CPT | Mod: HCNC,76,, | Performed by: INTERNAL MEDICINE

## 2024-11-14 PROCEDURE — 83935 ASSAY OF URINE OSMOLALITY: CPT | Mod: HCNC | Performed by: EMERGENCY MEDICINE

## 2024-11-14 PROCEDURE — 99291 CRITICAL CARE FIRST HOUR: CPT | Mod: HCNC

## 2024-11-14 PROCEDURE — 84300 ASSAY OF URINE SODIUM: CPT | Mod: HCNC | Performed by: EMERGENCY MEDICINE

## 2024-11-14 PROCEDURE — 99291 CRITICAL CARE FIRST HOUR: CPT | Mod: HCNC,GC,, | Performed by: EMERGENCY MEDICINE

## 2024-11-14 PROCEDURE — 96375 TX/PRO/DX INJ NEW DRUG ADDON: CPT | Mod: HCNC

## 2024-11-14 PROCEDURE — 25000003 PHARM REV CODE 250: Mod: HCNC | Performed by: EMERGENCY MEDICINE

## 2024-11-14 PROCEDURE — 96376 TX/PRO/DX INJ SAME DRUG ADON: CPT | Mod: HCNC

## 2024-11-14 PROCEDURE — 96374 THER/PROPH/DIAG INJ IV PUSH: CPT | Mod: HCNC

## 2024-11-14 PROCEDURE — 80053 COMPREHEN METABOLIC PANEL: CPT | Mod: HCNC | Performed by: EMERGENCY MEDICINE

## 2024-11-14 PROCEDURE — 81001 URINALYSIS AUTO W/SCOPE: CPT | Mod: HCNC | Performed by: EMERGENCY MEDICINE

## 2024-11-14 PROCEDURE — 63600175 PHARM REV CODE 636 W HCPCS: Mod: HCNC | Performed by: EMERGENCY MEDICINE

## 2024-11-14 PROCEDURE — 25000003 PHARM REV CODE 250: Mod: HCNC

## 2024-11-14 PROCEDURE — 82077 ASSAY SPEC XCP UR&BREATH IA: CPT | Mod: HCNC | Performed by: EMERGENCY MEDICINE

## 2024-11-14 PROCEDURE — 20000000 HC ICU ROOM: Mod: HCNC

## 2024-11-14 PROCEDURE — 94761 N-INVAS EAR/PLS OXIMETRY MLT: CPT

## 2024-11-14 RX ORDER — PANTOPRAZOLE SODIUM 40 MG/1
40 TABLET, DELAYED RELEASE ORAL DAILY
Status: DISCONTINUED | OUTPATIENT
Start: 2024-11-14 | End: 2024-11-20 | Stop reason: HOSPADM

## 2024-11-14 RX ORDER — DEXMEDETOMIDINE HYDROCHLORIDE 4 UG/ML
0-1.4 INJECTION, SOLUTION INTRAVENOUS CONTINUOUS
Status: DISCONTINUED | OUTPATIENT
Start: 2024-11-14 | End: 2024-11-16

## 2024-11-14 RX ORDER — METOPROLOL TARTRATE 1 MG/ML
5 INJECTION, SOLUTION INTRAVENOUS
Status: COMPLETED | OUTPATIENT
Start: 2024-11-14 | End: 2024-11-14

## 2024-11-14 RX ORDER — LORAZEPAM 2 MG/ML
2 INJECTION INTRAMUSCULAR
Status: COMPLETED | OUTPATIENT
Start: 2024-11-14 | End: 2024-11-14

## 2024-11-14 RX ORDER — IBUPROFEN 200 MG
24 TABLET ORAL
Status: DISCONTINUED | OUTPATIENT
Start: 2024-11-14 | End: 2024-11-20 | Stop reason: HOSPADM

## 2024-11-14 RX ORDER — DIAZEPAM 10 MG/2ML
10 INJECTION INTRAMUSCULAR EVERY 8 HOURS
Status: DISCONTINUED | OUTPATIENT
Start: 2024-11-14 | End: 2024-11-15

## 2024-11-14 RX ORDER — ONDANSETRON HYDROCHLORIDE 2 MG/ML
4 INJECTION, SOLUTION INTRAVENOUS EVERY 8 HOURS PRN
Status: DISCONTINUED | OUTPATIENT
Start: 2024-11-14 | End: 2024-11-20 | Stop reason: HOSPADM

## 2024-11-14 RX ORDER — MAGNESIUM SULFATE HEPTAHYDRATE 40 MG/ML
2 INJECTION, SOLUTION INTRAVENOUS ONCE
Status: COMPLETED | OUTPATIENT
Start: 2024-11-14 | End: 2024-11-14

## 2024-11-14 RX ORDER — SODIUM CHLORIDE 0.9 % (FLUSH) 0.9 %
10 SYRINGE (ML) INJECTION
Status: DISCONTINUED | OUTPATIENT
Start: 2024-11-14 | End: 2024-11-20 | Stop reason: HOSPADM

## 2024-11-14 RX ORDER — GLUCAGON 1 MG
1 KIT INJECTION
Status: DISCONTINUED | OUTPATIENT
Start: 2024-11-14 | End: 2024-11-20 | Stop reason: HOSPADM

## 2024-11-14 RX ORDER — DIAZEPAM 5 MG/1
5 TABLET ORAL
Status: COMPLETED | OUTPATIENT
Start: 2024-11-14 | End: 2024-11-14

## 2024-11-14 RX ORDER — INSULIN ASPART 100 [IU]/ML
0-5 INJECTION, SOLUTION INTRAVENOUS; SUBCUTANEOUS
Status: DISCONTINUED | OUTPATIENT
Start: 2024-11-14 | End: 2024-11-20 | Stop reason: HOSPADM

## 2024-11-14 RX ORDER — IBUPROFEN 200 MG
16 TABLET ORAL
Status: DISCONTINUED | OUTPATIENT
Start: 2024-11-14 | End: 2024-11-20 | Stop reason: HOSPADM

## 2024-11-14 RX ORDER — LORAZEPAM 2 MG/ML
2 INJECTION INTRAMUSCULAR
Status: DISCONTINUED | OUTPATIENT
Start: 2024-11-14 | End: 2024-11-20 | Stop reason: HOSPADM

## 2024-11-14 RX ADMIN — LORAZEPAM 2 MG: 2 INJECTION INTRAMUSCULAR; INTRAVENOUS at 03:11

## 2024-11-14 RX ADMIN — METOPROLOL TARTRATE 5 MG: 1 INJECTION, SOLUTION INTRAVENOUS at 11:11

## 2024-11-14 RX ADMIN — LORAZEPAM 2 MG: 2 INJECTION INTRAMUSCULAR; INTRAVENOUS at 08:11

## 2024-11-14 RX ADMIN — LORAZEPAM 2 MG: 2 INJECTION INTRAMUSCULAR; INTRAVENOUS at 04:11

## 2024-11-14 RX ADMIN — LORAZEPAM 2 MG: 2 INJECTION INTRAMUSCULAR; INTRAVENOUS at 11:11

## 2024-11-14 RX ADMIN — SODIUM CHLORIDE 1000 ML: 9 INJECTION, SOLUTION INTRAVENOUS at 04:11

## 2024-11-14 RX ADMIN — DIAZEPAM 10 MG: 5 INJECTION, SOLUTION INTRAMUSCULAR; INTRAVENOUS at 10:11

## 2024-11-14 RX ADMIN — THIAMINE HYDROCHLORIDE 500 MG: 100 INJECTION, SOLUTION INTRAMUSCULAR; INTRAVENOUS at 04:11

## 2024-11-14 RX ADMIN — LORAZEPAM 2 MG: 2 INJECTION INTRAMUSCULAR; INTRAVENOUS at 02:11

## 2024-11-14 RX ADMIN — DIAZEPAM 5 MG: 5 TABLET ORAL at 01:11

## 2024-11-14 RX ADMIN — THIAMINE HYDROCHLORIDE 500 MG: 200 INJECTION, SOLUTION INTRAMUSCULAR; INTRAVENOUS at 08:11

## 2024-11-14 RX ADMIN — MAGNESIUM SULFATE IN WATER 2 G: 40 INJECTION, SOLUTION INTRAVENOUS at 06:11

## 2024-11-14 RX ADMIN — LORAZEPAM 2 MG: 2 INJECTION INTRAMUSCULAR; INTRAVENOUS at 01:11

## 2024-11-14 RX ADMIN — LORAZEPAM 2 MG: 2 INJECTION INTRAMUSCULAR; INTRAVENOUS at 05:11

## 2024-11-14 RX ADMIN — DEXMEDETOMIDINE HYDROCHLORIDE 0.2 MCG/KG/HR: 4 INJECTION INTRAVENOUS at 03:11

## 2024-11-14 NOTE — H&P
"  [facility] - Emergency Dept  Critical Care Medicine  History & Physical    Patient Name: Donald Warren Abadie  MRN: [id number]  Admission Date: 11/14/2024  Hospital Length of Stay: 0 days  Code Status: Full Code  Attending Physician: Perry Spring MD   Primary Care Provider: Bacilio Larry MD   Principal Problem: <principal problem not specified>    Subjective:     HPI:  Donald Abadie is a 69-year-old male with a past medical history of Afib, gout, anxiety, left sided RCC s/p partial nephrectomy (2014), HTN, HLD, subdural hematoma (9/20/2024), and alcoholism who presents to the ED with concern of alcohol withdrawal. He has been trying to cut down on his drinking after a recent fall with a head injury and subdural but typically drinks about 5-6 beers daily. His daughter came to his house because he was short of breath and was complaining that his heart was racing. She is a nurse and found him in Afib with elevated rate. He fell the night prior to admission as well. She is worried about him being able to detox at home. He states that he is feeling short of breath and anxious. He had minimal confusion at the time of arrival to the ED.    In the ED, , Na 122, Cl 90, CO2 20, glucose 130, BUN 6, Creatinine 1.6, T.Bili 1.6, AST 67, Magnesium 1.3. CT head with no evidence of acute intracranial hemorrhage. At the time of MICU evaluation the patient is tachycardic, tachypneic, and diaphoretic despite 4 mg IV ativan and 5 mg PO diazepam. He exhibits hallucinations and nonsensical speech upon evaluation.    Hospital/ICU Course:  No notes on file     Past Medical History:   Diagnosis Date    A-fib     Alcohol abuse     Anxiety     Arthritis     Chronic gout     Diabetes mellitus     DM (diabetes mellitus) 11/16/2016    "boarderline, not taking any meds currently"    Fatty liver     Hyperlipidemia     Renal cell cancer 2014    S/P TKR (total knee replacement), right 06/27/2019       Past Surgical History:   Procedure " Laterality Date    ABSCESS DRAINAGE      perirectal    COLONOSCOPY      ENDOSCOPIC ULTRASOUND OF UPPER GASTROINTESTINAL TRACT N/A 6/11/2024    Procedure: ULTRASOUND, UPPER GI TRACT, ENDOSCOPIC;  Surgeon: Mode Wood MD;  Location: [facility];  Service: Endoscopy;  Laterality: N/A;  6/7 portal-EUS in 7-10 days please, [facility] [place] or [place]-[facility] approved  Te 6/7-tt  6/10-precall complete-MS    HAND SURGERY Left     JOINT REPLACEMENT      knee replacement right knee    KIDNEY SURGERY      partial left kidney removal - [place]    PARTIAL NEPHRECTOMY Left August 2014    PERCUTANEOUS CRYOTHERAPY OF PERIPHERAL NERVE USING LIQUID NITROUS OXIDE IN CLOSED NEEDLE DEVICE Right 6/17/2019    Procedure: CRYOTHERAPY, NERVE, PERIPHERAL, PERCUTANEOUS, USING LIQUID NITROUS OXIDE IN CLOSED NEEDLE DEVICE-right knee iovera;  Surgeon: Donny Hair III, MD;  Location: [facility] CATH LAB;  Service: Pain Management;  Laterality: Right;    TOTAL KNEE ARTHROPLASTY Right 6/27/2019    Procedure: ARTHROPLASTY, KNEE, TOTAL-SAME DAY;  Surgeon: Mikael Huerta MD;  Location: [facility] OR [address] [place] [address];  Service: Orthopedics;  Laterality: Right;       Review of patient's allergies indicates:   Allergen Reactions    No known drug allergies        Family History       Problem Relation (Age of Onset)    Alcohol abuse Brother    Alzheimer's disease Mother, Brother    Cancer Father, Sister    Colon cancer Father    Colon polyps Brother    Diabetes Mother    Lung cancer Father, Sister          Tobacco Use    Smoking status: Never    Smokeless tobacco: Never   Substance and Sexual Activity    Alcohol use: Yes     Alcohol/week: 168.0 standard drinks of alcohol     Types: 168 Cans of beer per week     Comment: 12-24  beers daily    Drug use: No    Sexual activity: Not Currently     Partners: Female      Review of Systems   Constitutional:  Positive for diaphoresis and fatigue. Negative for fever.   HENT:  Negative for congestion, drooling, facial swelling and  nosebleeds.    Eyes:  Negative for discharge and redness.   Respiratory:  Positive for shortness of breath. Negative for apnea, cough and wheezing.    Cardiovascular:  Negative for chest pain.   Gastrointestinal:  Negative for abdominal distention and abdominal pain.   Genitourinary:  Negative for dysuria and flank pain.   Musculoskeletal:  Negative for arthralgias and joint swelling.   Skin:  Negative for wound.   Neurological:  Positive for dizziness, speech difficulty and weakness. Negative for seizures, light-headedness and numbness.   Psychiatric/Behavioral:  Positive for agitation, behavioral problems and confusion. Negative for self-injury.      Objective:     Vital Signs (Most Recent):  Temp: 98.1 °F (36.7 °C) (11/14/24 0958)  Pulse: 102 (11/14/24 1309)  Resp: 18 (11/14/24 1115)  BP: 120/80 (11/14/24 1308)  SpO2: 98 % (11/14/24 1309) Vital Signs (24h Range):  Temp:  [98.1 °F (36.7 °C)] 98.1 °F (36.7 °C)  Pulse:  [] 102  Resp:  [18-20] 18  SpO2:  [98 %-100 %] 98 %  BP: ()/(60-80) 120/80   Weight: 77.1 kg (170 lb)  Body mass index is 27.44 kg/m².    No intake or output data in the 24 hours ending 11/14/24 1402       Physical Exam  Constitutional:       Appearance: He is ill-appearing and diaphoretic.      Comments: Difficult to redirect but able to answer some questions. Trying to climb out of the bed. Diaphoretic     Eyes:      Extraocular Movements: Extraocular movements intact.      Pupils: Pupils are equal, round, and reactive to light.   Cardiovascular:      Rate and Rhythm: Tachycardia present. Rhythm irregular.      Pulses: Normal pulses.   Pulmonary:      Effort: Pulmonary effort is normal. No respiratory distress.      Breath sounds: Normal breath sounds.      Comments: Does not have an increased work of breathing  Abdominal:      General: There is no distension.      Palpations: Abdomen is soft.      Tenderness: There is no abdominal tenderness. There is no guarding.   Musculoskeletal:     "     General: No swelling.   Skin:     General: Skin is warm.   Neurological:      Mental Status: He is alert. He is disoriented.      Coordination: Coordination abnormal.      Comments: Tremors, restless   Psychiatric:      Comments: Difficult to assess given his current mental status/inability to fully respond/answer questions              Vents:     Lines/Drains/Airways       Peripheral Intravenous Line  Duration                  Peripheral IV - Single Lumen 11/14/24 1151 20 G Anterior;Left Forearm <1 day                  Significant Labs:    CBC/Anemia Profile:  Recent Labs   Lab 11/14/24  1152   WBC 9.95   HGB 15.1   HCT 43.0      MCV 91   RDW 13.1        Chemistries:  Recent Labs   Lab 11/14/24  1152   *   K 4.3   CL 90*   CO2 20*   BUN 6*   CREATININE 1.6*   CALCIUM 8.7   ALBUMIN 4.0   PROT 7.0   BILITOT 1.6*   ALKPHOS 74   ALT 27   AST 67*   MG 1.3*       All pertinent labs within the past 24 hours have been reviewed.    Significant Imaging: I have reviewed all pertinent imaging results/findings within the past 24 hours.  Assessment/Plan:     Psychiatric  Alcohol withdrawal syndrome with complication  Pt presented to ED with c/o shortness of breath and "heart racing". Has been trying to cut back on alcohol use. Now in alcohol withdrawals.     --Daily thiamine, folic acid, multivitamin  --CIWA protocol q4hrs      Cardiac/Vascular  Heart failure with mildly reduced ejection fraction (HFmrEF)  Results for orders placed during the hospital encounter of 11/29/22    Echo    Interpretation Summary  · The left ventricle is normal in size with mildly decreased systolic function. The estimated ejection fraction is 45-50%.  · There is mild left ventricular global hypokinesis.  · Normal right ventricular size with normal right ventricular systolic function.  · Grade I left ventricular diastolic dysfunction.  · The estimated PA systolic pressure is 33 mmHg.  · Normal central venous pressure (3 " mmHg).    --follow formal echo    Paroxysmal atrial fibrillation  Hx of atrial fibrillation    --Continue home dronedarone    Renal/  JASS (acute kidney injury)  Baseline Cr- 1.0. Cr- 1.6 BUN-6 on arrival. Collapsible IVC seen on POCUS    --Qd CMP  --Urine osm and FeNA follow  --fluid resuscitation    Hypomagnesemia  Mag 1.3    --Replace as needed  --Keep Mag >2  --Daily CMP    Endocrine  Type 2 diabetes mellitus without complication, without long-term current use of insulin  HgbA1c 5.9 (8 months ago)    --POC glucose AC/HS  --Low dose Sliding scale insulin  --Hypoglycemia protocol    Hyponatremia  Na 122 likely secondary to chronic alcohol use    --Daily CMP  --POCUS showed collapsible IVC, fluid resuscitation         Critical secondary to Patient has a condition that poses threat to life and bodily function: Delirium Tremens 2/2 alcohol withdrawal    Critical care was time spent personally by me on the following activities: development of treatment plan with patient or surrogate and bedside caregivers, discussions with consultants, evaluation of patient's response to treatment, examination of patient, ordering and performing treatments and interventions, ordering and review of laboratory studies, ordering and review of radiographic studies, pulse oximetry, re-evaluation of patient's condition. This critical care time did not overlap with that of any other provider or involve time for any procedures.     Juan Johnson MD  Critical Care Medicine  [facility] - Emergency Dept

## 2024-11-14 NOTE — ASSESSMENT & PLAN NOTE
Results for orders placed during the hospital encounter of 11/29/22    Echo    Interpretation Summary  · The left ventricle is normal in size with mildly decreased systolic function. The estimated ejection fraction is 45-50%.  · There is mild left ventricular global hypokinesis.  · Normal right ventricular size with normal right ventricular systolic function.  · Grade I left ventricular diastolic dysfunction.  · The estimated PA systolic pressure is 33 mmHg.  · Normal central venous pressure (3 mmHg).    --follow formal echo

## 2024-11-14 NOTE — ASSESSMENT & PLAN NOTE
HgbA1c 5.9 (8 months ago)    --POC glucose AC/HS  --Low dose Sliding scale insulin  --Hypoglycemia protocol

## 2024-11-14 NOTE — SUBJECTIVE & OBJECTIVE
"Past Medical History:   Diagnosis Date    A-fib     Alcohol abuse     Anxiety     Arthritis     Chronic gout     Diabetes mellitus     DM (diabetes mellitus) 11/16/2016    "boarderline, not taking any meds currently"    Fatty liver     Hyperlipidemia     Renal cell cancer 2014    S/P TKR (total knee replacement), right 06/27/2019       Past Surgical History:   Procedure Laterality Date    ABSCESS DRAINAGE      perirectal    COLONOSCOPY      ENDOSCOPIC ULTRASOUND OF UPPER GASTROINTESTINAL TRACT N/A 6/11/2024    Procedure: ULTRASOUND, UPPER GI TRACT, ENDOSCOPIC;  Surgeon: Mode Wood MD;  Location: [facility] ENDO;  Service: Endoscopy;  Laterality: N/A;  6/7 portal-EUS in 7-10 days please, [facility] [place]-[facility] approved  Te 6/7-tt  6/10-precall complete-MS    HAND SURGERY Left     JOINT REPLACEMENT      knee replacement right knee    KIDNEY SURGERY      partial left kidney removal - [place]    PARTIAL NEPHRECTOMY Left August 2014    PERCUTANEOUS CRYOTHERAPY OF PERIPHERAL NERVE USING LIQUID NITROUS OXIDE IN CLOSED NEEDLE DEVICE Right 6/17/2019    Procedure: CRYOTHERAPY, NERVE, PERIPHERAL, PERCUTANEOUS, USING LIQUID NITROUS OXIDE IN CLOSED NEEDLE DEVICE-right knee iovera;  Surgeon: Donny Hair III, MD;  Location: [facility] CATH LAB;  Service: Pain Management;  Laterality: Right;    TOTAL KNEE ARTHROPLASTY Right 6/27/2019    Procedure: ARTHROPLASTY, KNEE, TOTAL-SAME DAY;  Surgeon: Mikael Huerta MD;  Location: [facility] OR [address] [place] [address];  Service: Orthopedics;  Laterality: Right;       Review of patient's allergies indicates:   Allergen Reactions    No known drug allergies        Family History       Problem Relation (Age of Onset)    Alcohol abuse Brother    Alzheimer's disease Mother, Brother    Cancer Father, Sister    Colon cancer Father    Colon polyps Brother    Diabetes Mother    Lung cancer Father, Sister          Tobacco Use    Smoking status: Never    Smokeless tobacco: Never   Substance and Sexual Activity "    Alcohol use: Yes     Alcohol/week: 168.0 standard drinks of alcohol     Types: 168 Cans of beer per week     Comment: 12-24  beers daily    Drug use: No    Sexual activity: Not Currently     Partners: Female      Review of Systems   Constitutional:  Positive for diaphoresis and fatigue. Negative for fever.   HENT:  Negative for congestion, drooling, facial swelling and nosebleeds.    Eyes:  Negative for discharge and redness.   Respiratory:  Positive for shortness of breath. Negative for apnea, cough and wheezing.    Cardiovascular:  Negative for chest pain.   Gastrointestinal:  Negative for abdominal distention and abdominal pain.   Genitourinary:  Negative for dysuria and flank pain.   Musculoskeletal:  Negative for arthralgias and joint swelling.   Skin:  Negative for wound.   Neurological:  Positive for dizziness, speech difficulty and weakness. Negative for seizures, light-headedness and numbness.   Psychiatric/Behavioral:  Positive for agitation, behavioral problems and confusion. Negative for self-injury.      Objective:     Vital Signs (Most Recent):  Temp: 98.1 °F (36.7 °C) (11/14/24 0958)  Pulse: 102 (11/14/24 1309)  Resp: 18 (11/14/24 1115)  BP: 120/80 (11/14/24 1308)  SpO2: 98 % (11/14/24 1309) Vital Signs (24h Range):  Temp:  [98.1 °F (36.7 °C)] 98.1 °F (36.7 °C)  Pulse:  [] 102  Resp:  [18-20] 18  SpO2:  [98 %-100 %] 98 %  BP: ()/(60-80) 120/80   Weight: 77.1 kg (170 lb)  Body mass index is 27.44 kg/m².    No intake or output data in the 24 hours ending 11/14/24 1402       Physical Exam  Constitutional:       Appearance: He is ill-appearing and diaphoretic.      Comments: Difficult to redirect but able to answer some questions. Trying to climb out of the bed. Diaphoretic     Eyes:      Extraocular Movements: Extraocular movements intact.      Pupils: Pupils are equal, round, and reactive to light.   Cardiovascular:      Rate and Rhythm: Tachycardia present. Rhythm irregular.      Pulses:  Normal pulses.   Pulmonary:      Effort: Pulmonary effort is normal. No respiratory distress.      Breath sounds: Normal breath sounds.      Comments: Does not have an increased work of breathing  Abdominal:      General: There is no distension.      Palpations: Abdomen is soft.      Tenderness: There is no abdominal tenderness. There is no guarding.   Musculoskeletal:         General: No swelling.   Skin:     General: Skin is warm.   Neurological:      Mental Status: He is alert. He is disoriented.      Coordination: Coordination abnormal.      Comments: Tremors, restless   Psychiatric:      Comments: Difficult to assess given his current mental status/inability to fully respond/answer questions              Vents:     Lines/Drains/Airways       Peripheral Intravenous Line  Duration                  Peripheral IV - Single Lumen 11/14/24 1151 20 G Anterior;Left Forearm <1 day                  Significant Labs:    CBC/Anemia Profile:  Recent Labs   Lab 11/14/24  1152   WBC 9.95   HGB 15.1   HCT 43.0      MCV 91   RDW 13.1        Chemistries:  Recent Labs   Lab 11/14/24  1152   *   K 4.3   CL 90*   CO2 20*   BUN 6*   CREATININE 1.6*   CALCIUM 8.7   ALBUMIN 4.0   PROT 7.0   BILITOT 1.6*   ALKPHOS 74   ALT 27   AST 67*   MG 1.3*       All pertinent labs within the past 24 hours have been reviewed.    Significant Imaging: I have reviewed all pertinent imaging results/findings within the past 24 hours.

## 2024-11-14 NOTE — ED NOTES
"Pt diaphoretic, trying to get out of bed, hallucinating stating he is "trying to get these people out of my room". MD notified, pt re-directed into bed.  "

## 2024-11-14 NOTE — ED PROVIDER NOTES
"Encounter Date: 11/14/2024       History     Chief Complaint   Patient presents with    Delirium Tremens (DTS)     Last drink this morning , shakes, hx dementia , sob    Shortness of Breath    Fall     Fell few times r shoulder/arm pain     69-year-old male with history of AFib in alcoholism presents with concern of alcohol withdrawal.  He has been trying to cut down in his drinking.  He had a recent fall with a head injury and brain hemorrhage.  Since then he has been trying to decrease his alcohol.  Normally he would be drinking a fair amount by now but he only had 1 beer.  His daughter came over because he was short of breath in his heart was going fast.  She is a nurse and found him in AFib and tachycardic.  He fell last night as well.  She is worried about him being able to do detox at home.  He states that he is feeling short of breath and anxious.  He does not seem to be confused        Review of patient's allergies indicates:   Allergen Reactions    No known drug allergies      Past Medical History:   Diagnosis Date    A-fib     Alcohol abuse     Anxiety     Arthritis     Chronic gout     Diabetes mellitus     DM (diabetes mellitus) 11/16/2016    "boarderline, not taking any meds currently"    Fatty liver     Hyperlipidemia     Renal cell cancer 2014    S/P TKR (total knee replacement), right 06/27/2019     Past Surgical History:   Procedure Laterality Date    ABSCESS DRAINAGE      perirectal    COLONOSCOPY      ENDOSCOPIC ULTRASOUND OF UPPER GASTROINTESTINAL TRACT N/A 6/11/2024    Procedure: ULTRASOUND, UPPER GI TRACT, ENDOSCOPIC;  Surgeon: Mode Wood MD;  Location: [facility] ENDO;  Service: Endoscopy;  Laterality: N/A;  6/7 portal-EUS in 7-10 days please, [facility] [place] or [place]-[facility]iqu[facility] approved  Te 6/7-tt  6/10-precall complete-MS    HAND SURGERY Left     JOINT REPLACEMENT      knee replacement right knee    KIDNEY SURGERY      partial left kidney removal - [place]    PARTIAL NEPHRECTOMY Left August 2014    " PERCUTANEOUS CRYOTHERAPY OF PERIPHERAL NERVE USING LIQUID NITROUS OXIDE IN CLOSED NEEDLE DEVICE Right 6/17/2019    Procedure: CRYOTHERAPY, NERVE, PERIPHERAL, PERCUTANEOUS, USING LIQUID NITROUS OXIDE IN CLOSED NEEDLE DEVICE-right knee iovera;  Surgeon: Donny Hair III, MD;  Location: [facility] CATH LAB;  Service: Pain Management;  Laterality: Right;    TOTAL KNEE ARTHROPLASTY Right 6/27/2019    Procedure: ARTHROPLASTY, KNEE, TOTAL-SAME DAY;  Surgeon: Mikael Huerta MD;  Location: [facility] OR [address] [place] [address];  Service: Orthopedics;  Laterality: Right;     Family History   Problem Relation Name Age of Onset    Lung cancer Father      Colon cancer Father      Cancer Father          lung cancer    Diabetes Mother      Alzheimer's disease Mother      Lung cancer Sister      Cancer Sister          lung    Colon polyps Brother      Alcohol abuse Brother      Alzheimer's disease Brother      Cirrhosis Neg Hx       Social History     Tobacco Use    Smoking status: Never    Smokeless tobacco: Never   Substance Use Topics    Alcohol use: Yes     Alcohol/week: 168.0 standard drinks of alcohol     Types: 168 Cans of beer per week     Comment: 12-24  beers daily    Drug use: No     Review of Systems    Physical Exam     Initial Vitals [11/14/24 0958]   BP Pulse Resp Temp SpO2   99/60 (!) 112 20 98.1 °F (36.7 °C) 99 %      MAP       --         Physical Exam    Constitutional: He appears well-developed and well-nourished. No distress.   HENT:   Head: Normocephalic and atraumatic. Mouth/Throat: Oropharynx is clear and moist.   Eyes: Conjunctivae and EOM are normal. Pupils are equal, round, and reactive to light. Right eye exhibits no discharge. Left eye exhibits no discharge. No scleral icterus.   Neck: Neck supple. No JVD present.   Normal range of motion.  Cardiovascular:  Normal heart sounds and intact distal pulses.     Exam reveals no friction rub.       No murmur heard.  Irregularly irregular and tachycardic   Pulmonary/Chest:  Breath sounds normal. No stridor. No respiratory distress. He has no wheezes. He has no rhonchi. He has no rales.   Abdominal: Abdomen is soft. Bowel sounds are normal. He exhibits no distension and no mass. There is no abdominal tenderness. There is no rebound and no guarding.   Musculoskeletal:         General: No tenderness or edema.      Cervical back: Normal range of motion and neck supple.      Comments: Limited range of motion of right shoulder.  This is pre-existing     Lymphadenopathy:     He has no cervical adenopathy.   Neurological: He is alert. He has normal strength. No cranial nerve deficit or sensory deficit. GCS score is 15. GCS eye subscore is 4. GCS verbal subscore is 5. GCS motor subscore is 6.   Tremulous   Skin: Skin is warm. Capillary refill takes less than 2 seconds. No rash noted.   Psychiatric: He has a normal mood and affect.         ED Course   Procedures  Labs Reviewed   CBC W/ AUTO DIFFERENTIAL - Abnormal       Result Value    WBC 9.95      RBC 4.74      Hemoglobin 15.1      Hematocrit 43.0      MCV 91      MCH 31.9 (*)     MCHC 35.1      RDW 13.1      Platelets 159      MPV 8.7 (*)     Immature Granulocytes 0.4      Gran # (ANC) 7.1      Immature Grans (Abs) 0.04      Lymph # 1.7      Mono # 1.0      Eos # 0.1      Baso # 0.06      nRBC 0      Gran % 71.8      Lymph % 16.8 (*)     Mono % 9.8      Eosinophil % 0.6      Basophil % 0.6      Differential Method Automated     COMPREHENSIVE METABOLIC PANEL - Abnormal    Sodium 122 (*)     Potassium 4.3      Chloride 90 (*)     CO2 20 (*)     Glucose 130 (*)     BUN 6 (*)     Creatinine 1.6 (*)     Calcium 8.7      Total Protein 7.0      Albumin 4.0      Total Bilirubin 1.6 (*)     Alkaline Phosphatase 74      AST 67 (*)     ALT 27      eGFR 46.1 (*)     Anion Gap 12     B-TYPE NATRIURETIC PEPTIDE - Abnormal     (*)    MAGNESIUM - Abnormal    Magnesium 1.3 (*)    URINALYSIS, REFLEX TO URINE CULTURE - Abnormal    Specimen UA Urine,  Catheterized      Color, UA Yellow      Appearance, UA Hazy (*)     pH, UA 6.0      Specific Gravity, UA 1.020      Protein, UA 1+ (*)     Glucose, UA Trace (*)     Ketones, UA Negative      Bilirubin (UA) Negative      Occult Blood UA 1+ (*)     Nitrite, UA Negative      Leukocytes, UA Negative      Narrative:     Specimen Source->Urine   URINALYSIS MICROSCOPIC - Abnormal    RBC, UA 15 (*)     WBC, UA 5      Bacteria Occasional      Squam Epithel, UA 0      Hyaline Casts, UA 7 (*)     Microscopic Comment SEE COMMENT      Narrative:     Specimen Source->Urine   BASIC METABOLIC PANEL - Abnormal    Sodium 125 (*)     Potassium 3.7      Chloride 93 (*)     CO2 18 (*)     Glucose 99      BUN 7 (*)     Creatinine 1.4      Calcium 8.3 (*)     Anion Gap 14      eGFR 54.1 (*)     Narrative:     Collect after he receives 1L of NS   OSMOLALITY, SERUM - Abnormal    Osmolality 264 (*)     Narrative:     Collect after he receives 1L of NS   TROPONIN I    Troponin I 0.006     TROPONIN I    Troponin I 0.006      Narrative:     Collect after he receives 1L of NS   ALCOHOL,MEDICAL (ETHANOL)    Alcohol, Serum <10     OSMOLALITY, URINE RANDOM   SODIUM, URINE, RANDOM   LIPASE   HEMOGLOBIN A1C   CK   POCT GLUCOSE    POCT Glucose 99     POCT GLUCOSE MONITORING CONTINUOUS        ECG Results              EKG 12-lead (Final result)        Collection Time Result Time QRS Duration OHS QTC Calculation    11/14/24 11:55:34 11/14/24 14:10:17 82 430                     Final result by Interface, Lab In [facility] (11/14/24 14:10:20)                   Narrative:    Test Reason :    Vent. Rate : 100 BPM     Atrial Rate : 107 BPM     P-R Int :    ms          QRS Dur :  82 ms      QT Int : 334 ms       P-R-T Axes :     -4  -6 degrees    QTcB Int : 430 ms    Baseline Artifact  Atrial fibrillation  Abnormal ECG  When compared with ECG of 14-Nov-2024 11:54,  No significant change was found  Confirmed by Quique Hernandez ([id number]) on 11/14/2024 2:10:16  PM    Referred By: AAAREFERRAL SELF           Confirmed By: Quique Hernandez                                     EKG 12-lead (Final result)        Collection Time Result Time QRS Duration OHS QTC Calculation    11/14/24 11:54:01 11/14/24 14:10:03 98 499                     Final result by Interface, Lab In [facility] (11/14/24 14:10:12)                   Narrative:    Test Reason : R07.9,    Vent. Rate : 106 BPM     Atrial Rate : 258 BPM     P-R Int :    ms          QRS Dur :  98 ms      QT Int : 376 ms       P-R-T Axes :     -4   6 degrees    QTcB Int : 499 ms    Baseline Artifact  Atrial fibrillation with rapid ventricular response  Abnormal ECG  When compared with ECG of 14-Nov-2024 11:14,  No significant change was found  Confirmed by Quique Hernandez ([id number]) on 11/14/2024 2:09:58 PM    Referred By: AAAREFERRAL SELF           Confirmed By: Quique Hernandez                                     EKG 12-lead (Final result)        Collection Time Result Time QRS Duration OHS QTC Calculation    11/14/24 11:14:35 11/14/24 14:09:47 86 474                     Final result by Interface, Lab In [facility] (11/14/24 14:09:51)                   Narrative:    Test Reason :    Vent. Rate : 102 BPM     Atrial Rate : 100 BPM     P-R Int :    ms          QRS Dur :  86 ms      QT Int : 364 ms       P-R-T Axes :      1   0 degrees    QTcB Int : 474 ms    Baseline Artifact  Atrial fibrillation with rapid ventricular response  Abnormal ECG  When compared with ECG of 14-Nov-2024 10:06,  No significant change was found  Confirmed by Quique Hernandez ([id number]) on 11/14/2024 2:09:44 PM    Referred By: AAAREFERRAL SELF           Confirmed By: Quique Hernandez                                     EKG 12-lead (Final result)        Collection Time Result Time QRS Duration OHS QTC Calculation    11/14/24 10:06:07 11/14/24 14:09:30 82 481                     Final result by Interface, Lab In [facility] (11/14/24 14:09:38)                   Narrative:    Test Reason :  R06.02,    Vent. Rate : 111 BPM     Atrial Rate :    BPM     P-R Int :    ms          QRS Dur :  82 ms      QT Int : 354 ms       P-R-T Axes :      0  -3 degrees    QTcB Int : 481 ms    Atrial fibrillation with rapid ventricular response  Abnormal ECG  No previous ECGs available  Confirmed by Quique Hernandez ([id number]) on 11/14/2024 2:09:28 PM    Referred By: AAAREFERRAL SELF           Confirmed By: Quique Hernandez                                  Imaging Results              X-Ray Chest 1 View (Final result)  Result time 11/14/24 14:52:46      Final result by Malcolm Spencer MD (11/14/24 14:52:46)                   Impression:      No radiographic acute intrathoracic process seen on this single view.      Electronically signed by: Malcolm Spencer MD  Date:    11/14/2024  Time:    14:52               Narrative:    EXAMINATION:  XR CHEST 1 VIEW    CLINICAL HISTORY:  Unspecified atrial fibrillation    TECHNIQUE:  Single frontal view of the chest was performed.    COMPARISON:  Chest radiograph 09/12/2024, CT abdomen and pelvis 05/02/2024    FINDINGS:  Monitoring leads overlie the chest.    No detrimental change.  Cardiomediastinal silhouette is midline and within normal limits for age, stable noting calcification along the aorta.  Pulmonary vasculature and hilar contours are within normal limits.    Bibasilar minimal platelike scarring versus atelectasis.  The lungs are otherwise symmetrically well expanded without consolidation, pleural effusion or pneumothorax.    No acute osseous process seen.  PA and lateral views can be obtained.                                       CT Head Without Contrast (Final result)  Result time 11/14/24 12:19:19      Final result by Cuauhtemoc Cummings MD (11/14/24 12:19:19)                   Impression:      No evidence of acute intracranial hemorrhage as above.      Electronically signed by: Cuauhtemoc Cummings MD  Date:    11/14/2024  Time:    12:19               Narrative:    EXAMINATION:  CT HEAD  WITHOUT CONTRAST    CLINICAL HISTORY:  Head trauma, minor (Age >= 65y);    TECHNIQUE:  Low dose axial CT images obtained throughout the head without the use of intravenous contrast.  Axial, sagittal and coronal reconstructions were performed.    COMPARISON:  10/02/2024    FINDINGS:  Intracranial compartment:    Ventricles are stable in size.  Mild cerebral volume loss, without evidence of hydrocephalus.    Small remote left occipital infarct.  No new parenchymal hemorrhage, edema, mass effect or major vascular distribution infarct.    No extra-axial blood or fluid collections.    Skull/extracranial contents (limited evaluation):    No displaced calvarial fracture.    The mastoid air cells and visualized paranasal sinuses are essentially clear.                                       Medications   sodium chloride 0.9% bolus 1,000 mL 1,000 mL (1,000 mLs Intravenous New Bag 11/14/24 1649)   diazePAM injection 10 mg (has no administration in time range)   LORazepam injection 2 mg (2 mg Intravenous Given 11/14/24 1631)   dronedarone tablet 400 mg (0 mg Oral Hold 11/14/24 1445)   apixaban tablet 5 mg (has no administration in time range)   pantoprazole EC tablet 40 mg (0 mg Oral Hold 11/14/24 1530)   sodium chloride 0.9% flush 10 mL (has no administration in time range)   ondansetron injection 4 mg (has no administration in time range)   multivitamin tablet (0 tablets Oral Hold 11/14/24 1545)   glucose chewable tablet 16 g (has no administration in time range)   glucose chewable tablet 24 g (has no administration in time range)   glucagon (human recombinant) injection 1 mg (has no administration in time range)   insulin aspart U-100 pen 0-5 Units (has no administration in time range)   dextrose 10% bolus 125 mL 125 mL (has no administration in time range)   dextrose 10% bolus 250 mL 250 mL (has no administration in time range)   dexmedetomidine (PRECEDEX) 400mcg/100mL 0.9% NaCL infusion (0.3 mcg/kg/hr × 77.1 kg Intravenous  No Dose/Rate Change 11/14/24 1615)   thiamine (B-1) 500 mg in D5W 100 mL IVPB (has no administration in time range)   LORazepam injection 2 mg (2 mg Intravenous Given 11/14/24 1152)   metoprolol injection 5 mg (5 mg Intravenous Given 11/14/24 1152)   diazePAM tablet 5 mg (5 mg Oral Given 11/14/24 1304)   thiamine (B-1) 500 mg in D5W 100 mL IVPB (500 mg Intravenous New Bag 11/14/24 1653)   LORazepam injection 2 mg (2 mg Intravenous Given 11/14/24 1326)     Medical Decision Making  Patient with history of alcoholism, atrial fibrillation anticoagulated with a recent fall.  He seems to be having some symptoms of withdrawal as well as AFib with RVR.  He is complaining of shortness of breath so will do a cardiac evaluation.  Will give dose of metoprolol as well as Ativan.  I spoke with his daughter to obtain more history.  I reviewed his medical records to obtain more history.  Will also do a head CT to evaluate for intracranial hemorrhage subdural hematoma traumatic subarachnoid/      Patient given benzos.  Seemed to get more agitated and started hallucinating.  I reviewed his labs.  His an JASS and a low sodium of 122.  Consult discussed with critical care.  They will admit     Amount and/or Complexity of Data Reviewed  Labs: ordered.  Radiology: ordered.    Risk  Prescription drug management.  Decision regarding hospitalization.              Attending Attestation:         Attending Critical Care:   Critical Care Times:   Direct Patient Care (initial evaluation, reassessments, and time considering the case)................................................................24 minutes.   Additional History from reviewing old medical records or taking additional history from the family, EMS, PCP, etc.......................4 minutes.   Ordering, Reviewing, and Interpreting Diagnostic Studies...............................................................................................................4 minutes.    Documentation..................................................................................................................................................................................4 minutes.   Consultation with other Physicians. .................................................................................................................................................4 minutes.   ==============================================================  Total Critical Care Time - exclusive of procedural time: 40 minutes.  ==============================================================                                 Clinical Impression:  Final diagnoses:  [R06.02] SOB (shortness of breath)  [R07.9] Chest pain  [I48.91] Atrial fibrillation  [F10.931] Alcohol withdrawal syndrome, with delirium (Primary)          ED Disposition Condition    Admit                 Edd Menendez MD  11/14/24 0655

## 2024-11-14 NOTE — ASSESSMENT & PLAN NOTE
Baseline Cr- 1.0. Cr- 1.6 BUN-6 on arrival. Collapsible IVC seen on POCUS    --Qd CMP  --Urine osm and FeNA follow  --fluid resuscitation

## 2024-11-14 NOTE — ED NOTES
Pt pulling monitoring equipment off, getting out of bed, trying to roam the halls. Pt re-directed into bed, attached to monitor, MD notified, charge RN to get safety sitter for pt.

## 2024-11-14 NOTE — CONSULTS
MICU Consult Note    Consult received. Patient to be admitted to the MICU. See full H&P.    Isauro Baltazar MD  Critical Care Medicine  11/14/2024   4:26 PM

## 2024-11-14 NOTE — HPI
Donald Abadie is a 69-year-old male with a past medical history of Afib, gout, anxiety, left sided RCC s/p partial nephrectomy (2014), HTN, HLD, subdural hematoma (9/20/2024), and alcoholism who presents to the ED with concern of alcohol withdrawal. He has been trying to cut down on his drinking after a recent fall with a head injury and subdural but typically drinks about 5-6 beers daily. His daughter came to his house because he was short of breath and was complaining that his heart was racing. She is a nurse and found him in Afib with elevated rate. He fell the night prior to admission as well. She is worried about him being able to detox at home. He states that he is feeling short of breath and anxious. He had minimal confusion at the time of arrival to the ED.    In the ED, , Na 122, Cl 90, CO2 20, glucose 130, BUN 6, Creatinine 1.6, T.Bili 1.6, AST 67, Magnesium 1.3. CT head with no evidence of acute intracranial hemorrhage. At the time of MICU evaluation the patient is tachycardic, tachypneic, and diaphoretic despite 4 mg IV ativan and 5 mg PO diazepam. He exhibits hallucinations and nonsensical speech upon evaluation.

## 2024-11-14 NOTE — ASSESSMENT & PLAN NOTE
Na 122 likely secondary to chronic alcohol use    --Daily CMP  --POCUS showed collapsible IVC, fluid resuscitation

## 2024-11-14 NOTE — ASSESSMENT & PLAN NOTE
"Pt presented to ED with c/o shortness of breath and "heart racing". Has been trying to cut back on alcohol use. Now in alcohol withdrawals.     --Daily thiamine, folic acid, multivitamin  --CIWA protocol q4hrs    "

## 2024-11-14 NOTE — ED NOTES
Patient comes into the emergency department by POV with complaints of SOB, dizziness. Patient states that he has been SOB x2 days with exertion, hx of A-fib. Pt also reports feeling dizzy this morning along with tremors, diaphoresis. Pt has hx of alcoholism, last drink this morning x1 beer. 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 Patient denies CP, N/V/D.     LOC: The patient is awake, alert and aware of environment with an appropriate affect, the patient is oriented x 3 and speaking appropriately.   APPEARANCE: Patient appears comfortable and in no acute distress, patient is clean and well groomed.  SKIN: The skin is warm and dry, color consistent with ethnicity, patient has normal skin turgor and moist mucus membranes, skin intact, no breakdown or bruising noted.   MUSCULOSKELETAL: Patient moving all extremities spontaneously, no swelling noted.  RESPIRATORY: Airway is open and patent, respirations are spontaneous, patient has a normal effort and rate, no accessory muscle. Pt reports SOB with exertion x2 days, currently on RA, no labored breathing noted, SATs 100%.  CARDIAC: Pt placed on cardiac monitor. Patient has a normal rate and regular rhythm, no edema noted, capillary refill < 3 seconds. Denies CP.  GASTRO: Soft and non tender to palpation, no distention noted.  : Pt denies any pain or frequency with urination.  NEURO: Pt opens eyes spontaneously, behavior appropriate to situation, follows commands, facial expression symmetrical, bilateral hand grasp equal and even, purposeful motor response noted, normal sensation in all extremities when touched with a finger. pt has slight tremors that can be felt with finger tips.

## 2024-11-15 PROBLEM — E83.42 HYPOMAGNESEMIA: Status: RESOLVED | Noted: 2022-11-29 | Resolved: 2024-11-15

## 2024-11-15 LAB
ALBUMIN SERPL BCP-MCNC: 3.3 G/DL (ref 3.5–5.2)
ALP SERPL-CCNC: 64 U/L (ref 40–150)
ALT SERPL W/O P-5'-P-CCNC: 21 U/L (ref 10–44)
ANION GAP SERPL CALC-SCNC: 10 MMOL/L (ref 8–16)
ANION GAP SERPL CALC-SCNC: 8 MMOL/L (ref 8–16)
ANION GAP SERPL CALC-SCNC: 8 MMOL/L (ref 8–16)
ASCENDING AORTA: 3.52 CM
AST SERPL-CCNC: 47 U/L (ref 10–40)
AV AREA BY CONTINUOUS VTI: 3.1 CM2
AV INDEX (PROSTH): 0.93
AV LVOT MEAN GRADIENT: 2 MMHG
AV LVOT PEAK GRADIENT: 3 MMHG
AV MEAN GRADIENT: 1.8 MMHG
AV PEAK GRADIENT: 3.2 MMHG
AV VALVE AREA BY VELOCITY RATIO: 3.5 CM²
AV VALVE AREA: 3.2 CM2
AV VELOCITY RATIO: 1
BASOPHILS # BLD AUTO: 0.06 K/UL (ref 0–0.2)
BASOPHILS NFR BLD: 1.1 % (ref 0–1.9)
BILIRUB SERPL-MCNC: 1.3 MG/DL (ref 0.1–1)
BSA FOR ECHO PROCEDURE: 1.81 M2
BUN SERPL-MCNC: 8 MG/DL (ref 8–23)
BUN SERPL-MCNC: 8 MG/DL (ref 8–23)
BUN SERPL-MCNC: 9 MG/DL (ref 8–23)
CALCIUM SERPL-MCNC: 8.1 MG/DL (ref 8.7–10.5)
CALCIUM SERPL-MCNC: 8.2 MG/DL (ref 8.7–10.5)
CALCIUM SERPL-MCNC: 8.5 MG/DL (ref 8.7–10.5)
CHLORIDE SERPL-SCNC: 100 MMOL/L (ref 95–110)
CHLORIDE SERPL-SCNC: 100 MMOL/L (ref 95–110)
CHLORIDE SERPL-SCNC: 97 MMOL/L (ref 95–110)
CK SERPL-CCNC: 124 U/L (ref 20–200)
CO2 SERPL-SCNC: 18 MMOL/L (ref 23–29)
CO2 SERPL-SCNC: 18 MMOL/L (ref 23–29)
CO2 SERPL-SCNC: 20 MMOL/L (ref 23–29)
CREAT SERPL-MCNC: 1 MG/DL (ref 0.5–1.4)
CREAT SERPL-MCNC: 1.1 MG/DL (ref 0.5–1.4)
CREAT SERPL-MCNC: 1.1 MG/DL (ref 0.5–1.4)
CV ECHO LV RWT: 0.45 CM
DIFFERENTIAL METHOD BLD: ABNORMAL
DOP CALC AO PEAK VEL: 0.9 M/S
DOP CALC AO VTI: 16.7 CM
DOP CALC LVOT AREA: 3.5 CM2
DOP CALC LVOT DIAMETER: 2.1 CM
DOP CALC LVOT PEAK VEL: 0.9 M/S
DOP CALC LVOT STROKE VOLUME: 53.7 CM3
DOP CALCLVOT PEAK VEL VTI: 15.5 CM
E WAVE DECELERATION TIME: 128 MS
E/A RATIO: 1.38
E/E' RATIO: 5.27 M/S
ECHO EF ESTIMATED: 56 %
ECHO LV POSTERIOR WALL: 0.9 CM (ref 0.6–1.1)
EOSINOPHIL # BLD AUTO: 0.1 K/UL (ref 0–0.5)
EOSINOPHIL NFR BLD: 1.8 % (ref 0–8)
ERYTHROCYTE [DISTWIDTH] IN BLOOD BY AUTOMATED COUNT: 13.3 % (ref 11.5–14.5)
EST. GFR  (NO RACE VARIABLE): >60 ML/MIN/1.73 M^2
ESTIMATED AVG GLUCOSE: 105 MG/DL (ref 68–131)
FRACTIONAL SHORTENING: 30 % (ref 28–44)
GLUCOSE SERPL-MCNC: 117 MG/DL (ref 70–110)
GLUCOSE SERPL-MCNC: 128 MG/DL (ref 70–110)
GLUCOSE SERPL-MCNC: 133 MG/DL (ref 70–110)
HBA1C MFR BLD: 5.3 % (ref 4–5.6)
HCT VFR BLD AUTO: 40.1 % (ref 40–54)
HGB BLD-MCNC: 14.3 G/DL (ref 14–18)
IMM GRANULOCYTES # BLD AUTO: 0.04 K/UL (ref 0–0.04)
IMM GRANULOCYTES NFR BLD AUTO: 0.7 % (ref 0–0.5)
INTERVENTRICULAR SEPTUM: 0.8 CM (ref 0.6–1.1)
LA MAJOR: 5.46 CM
LA MINOR: 4.97 CM
LA WIDTH: 4.18 CM
LEFT ATRIUM SIZE: 3.36 CM
LEFT ATRIUM VOLUME INDEX MOD: 38.6 ML/M2
LEFT ATRIUM VOLUME INDEX: 35.7 ML/M2
LEFT ATRIUM VOLUME MOD: 67.12 ML
LEFT ATRIUM VOLUME: 62.12 CM3
LEFT INTERNAL DIMENSION IN SYSTOLE: 2.8 CM (ref 2.1–4)
LEFT VENTRICLE DIASTOLIC VOLUME INDEX: 39.11 ML/M2
LEFT VENTRICLE DIASTOLIC VOLUME: 68.06 ML
LEFT VENTRICLE MASS INDEX: 58.3 G/M2
LEFT VENTRICLE SYSTOLIC VOLUME INDEX: 17.3 ML/M2
LEFT VENTRICLE SYSTOLIC VOLUME: 30.12 ML
LEFT VENTRICULAR INTERNAL DIMENSION IN DIASTOLE: 4 CM (ref 3.5–6)
LEFT VENTRICULAR MASS: 101.4 G
LV LATERAL E/E' RATIO: 4.14
LV SEPTAL E/E' RATIO: 7.25
LYMPHOCYTES # BLD AUTO: 1.8 K/UL (ref 1–4.8)
LYMPHOCYTES NFR BLD: 33 % (ref 18–48)
MAGNESIUM SERPL-MCNC: 2 MG/DL (ref 1.6–2.6)
MCH RBC QN AUTO: 32.6 PG (ref 27–31)
MCHC RBC AUTO-ENTMCNC: 35.7 G/DL (ref 32–36)
MCV RBC AUTO: 92 FL (ref 82–98)
MONOCYTES # BLD AUTO: 0.5 K/UL (ref 0.3–1)
MONOCYTES NFR BLD: 8.9 % (ref 4–15)
MV PEAK A VEL: 0.42 M/S
MV PEAK E VEL: 0.58 M/S
NEUTROPHILS # BLD AUTO: 3 K/UL (ref 1.8–7.7)
NEUTROPHILS NFR BLD: 54.5 % (ref 38–73)
NRBC BLD-RTO: 0 /100 WBC
OHS CV RV/LV RATIO: 0.98 CM
PHOSPHATE SERPL-MCNC: 3.6 MG/DL (ref 2.7–4.5)
PISA TR MAX VEL: 2.37 M/S
PLATELET # BLD AUTO: 100 K/UL (ref 150–450)
PMV BLD AUTO: 8.6 FL (ref 9.2–12.9)
POCT GLUCOSE: 123 MG/DL (ref 70–110)
POCT GLUCOSE: 126 MG/DL (ref 70–110)
POCT GLUCOSE: 165 MG/DL (ref 70–110)
POTASSIUM SERPL-SCNC: 3.1 MMOL/L (ref 3.5–5.1)
POTASSIUM SERPL-SCNC: 4 MMOL/L (ref 3.5–5.1)
POTASSIUM SERPL-SCNC: 5 MMOL/L (ref 3.5–5.1)
PROT SERPL-MCNC: 5.9 G/DL (ref 6–8.4)
RA MAJOR: 5.59 CM
RA PRESSURE ESTIMATED: 3 MMHG
RA WIDTH: 3.72 CM
RBC # BLD AUTO: 4.38 M/UL (ref 4.6–6.2)
RIGHT VENTRICLE DIASTOLIC BASEL DIMENSION: 3.9 CM
RV TB RVSP: 5 MMHG
SINUS: 3.49 CM
SODIUM SERPL-SCNC: 125 MMOL/L (ref 136–145)
SODIUM SERPL-SCNC: 126 MMOL/L (ref 136–145)
SODIUM SERPL-SCNC: 128 MMOL/L (ref 136–145)
STJ: 2.91 CM
TDI LATERAL: 0.14 M/S
TDI SEPTAL: 0.08 M/S
TDI: 0.11 M/S
TR MAX PG: 22 MMHG
TRICUSPID ANNULAR PLANE SYSTOLIC EXCURSION: 1.4 CM
TV PEAK GRADIENT: 22 MMHG
TV REST PULMONARY ARTERY PRESSURE: 25 MMHG
WBC # BLD AUTO: 5.42 K/UL (ref 3.9–12.7)
Z-SCORE OF LEFT VENTRICULAR DIMENSION IN END DIASTOLE: -1.86
Z-SCORE OF LEFT VENTRICULAR DIMENSION IN END SYSTOLE: -0.49

## 2024-11-15 PROCEDURE — 25500020 PHARM REV CODE 255: Mod: HCNC

## 2024-11-15 PROCEDURE — 25000003 PHARM REV CODE 250: Mod: HCNC

## 2024-11-15 PROCEDURE — 25000003 PHARM REV CODE 250: Mod: HCNC | Performed by: EMERGENCY MEDICINE

## 2024-11-15 PROCEDURE — 83735 ASSAY OF MAGNESIUM: CPT | Mod: HCNC

## 2024-11-15 PROCEDURE — 63600175 PHARM REV CODE 636 W HCPCS: Mod: HCNC

## 2024-11-15 PROCEDURE — 80048 BASIC METABOLIC PNL TOTAL CA: CPT | Mod: 91,HCNC,XB | Performed by: EMERGENCY MEDICINE

## 2024-11-15 PROCEDURE — 99291 CRITICAL CARE FIRST HOUR: CPT | Mod: HCNC,GC,, | Performed by: EMERGENCY MEDICINE

## 2024-11-15 PROCEDURE — 94761 N-INVAS EAR/PLS OXIMETRY MLT: CPT | Mod: HCNC

## 2024-11-15 PROCEDURE — 83036 HEMOGLOBIN GLYCOSYLATED A1C: CPT | Mod: HCNC

## 2024-11-15 PROCEDURE — 20000000 HC ICU ROOM: Mod: HCNC

## 2024-11-15 PROCEDURE — 80053 COMPREHEN METABOLIC PANEL: CPT | Mod: HCNC

## 2024-11-15 PROCEDURE — 84100 ASSAY OF PHOSPHORUS: CPT | Mod: HCNC

## 2024-11-15 PROCEDURE — 85025 COMPLETE CBC W/AUTO DIFF WBC: CPT | Mod: HCNC

## 2024-11-15 PROCEDURE — 82550 ASSAY OF CK (CPK): CPT | Mod: HCNC

## 2024-11-15 PROCEDURE — 80048 BASIC METABOLIC PNL TOTAL CA: CPT | Mod: HCNC,XB

## 2024-11-15 RX ORDER — DICLOFENAC SODIUM 10 MG/G
2 GEL TOPICAL 3 TIMES DAILY PRN
Status: DISCONTINUED | OUTPATIENT
Start: 2024-11-15 | End: 2024-11-20 | Stop reason: HOSPADM

## 2024-11-15 RX ORDER — POTASSIUM CHLORIDE 20 MEQ/1
40 TABLET, EXTENDED RELEASE ORAL ONCE
Status: DISCONTINUED | OUTPATIENT
Start: 2024-11-15 | End: 2024-11-15

## 2024-11-15 RX ORDER — MUPIROCIN 20 MG/G
OINTMENT TOPICAL 2 TIMES DAILY
Status: COMPLETED | OUTPATIENT
Start: 2024-11-15 | End: 2024-11-19

## 2024-11-15 RX ORDER — DIAZEPAM 5 MG/1
20 TABLET ORAL EVERY 8 HOURS
Status: DISCONTINUED | OUTPATIENT
Start: 2024-11-15 | End: 2024-11-16

## 2024-11-15 RX ORDER — THIAMINE HCL 100 MG
100 TABLET ORAL DAILY
Status: DISCONTINUED | OUTPATIENT
Start: 2024-11-20 | End: 2024-11-20 | Stop reason: HOSPADM

## 2024-11-15 RX ORDER — POTASSIUM CHLORIDE 7.45 MG/ML
10 INJECTION INTRAVENOUS
Status: COMPLETED | OUTPATIENT
Start: 2024-11-15 | End: 2024-11-15

## 2024-11-15 RX ADMIN — DIAZEPAM 10 MG: 5 INJECTION, SOLUTION INTRAMUSCULAR; INTRAVENOUS at 06:11

## 2024-11-15 RX ADMIN — LORAZEPAM 2 MG: 2 INJECTION INTRAMUSCULAR; INTRAVENOUS at 06:11

## 2024-11-15 RX ADMIN — DEXMEDETOMIDINE HYDROCHLORIDE 0.4 MCG/KG/HR: 4 INJECTION INTRAVENOUS at 08:11

## 2024-11-15 RX ADMIN — DEXMEDETOMIDINE HYDROCHLORIDE 0.4 MCG/KG/HR: 4 INJECTION INTRAVENOUS at 01:11

## 2024-11-15 RX ADMIN — DICLOFENAC SODIUM 2 G: 10 GEL TOPICAL at 10:11

## 2024-11-15 RX ADMIN — POTASSIUM CHLORIDE 10 MEQ: 7.46 INJECTION, SOLUTION INTRAVENOUS at 07:11

## 2024-11-15 RX ADMIN — SODIUM CHLORIDE 1000 ML: 9 INJECTION, SOLUTION INTRAVENOUS at 09:11

## 2024-11-15 RX ADMIN — MUPIROCIN: 20 OINTMENT TOPICAL at 08:11

## 2024-11-15 RX ADMIN — APIXABAN 5 MG: 5 TABLET, FILM COATED ORAL at 09:11

## 2024-11-15 RX ADMIN — HUMAN ALBUMIN MICROSPHERES AND PERFLUTREN 0.11 MG: 10; .22 INJECTION, SOLUTION INTRAVENOUS at 09:11

## 2024-11-15 RX ADMIN — LORAZEPAM 2 MG: 2 INJECTION INTRAMUSCULAR; INTRAVENOUS at 03:11

## 2024-11-15 RX ADMIN — PANTOPRAZOLE SODIUM 40 MG: 40 TABLET, DELAYED RELEASE ORAL at 08:11

## 2024-11-15 RX ADMIN — DIAZEPAM 20 MG: 5 TABLET ORAL at 09:11

## 2024-11-15 RX ADMIN — POTASSIUM CHLORIDE 10 MEQ: 7.46 INJECTION, SOLUTION INTRAVENOUS at 08:11

## 2024-11-15 RX ADMIN — APIXABAN 5 MG: 5 TABLET, FILM COATED ORAL at 08:11

## 2024-11-15 RX ADMIN — DRONEDARONE 400 MG: 400 TABLET, FILM COATED ORAL at 08:11

## 2024-11-15 RX ADMIN — THERA TABS 1 TABLET: TAB at 08:11

## 2024-11-15 RX ADMIN — MUPIROCIN: 20 OINTMENT TOPICAL at 09:11

## 2024-11-15 RX ADMIN — THIAMINE HYDROCHLORIDE 500 MG: 100 INJECTION, SOLUTION INTRAMUSCULAR; INTRAVENOUS at 09:11

## 2024-11-15 RX ADMIN — LORAZEPAM 2 MG: 2 INJECTION INTRAMUSCULAR; INTRAVENOUS at 08:11

## 2024-11-15 RX ADMIN — THIAMINE HYDROCHLORIDE 500 MG: 200 INJECTION, SOLUTION INTRAMUSCULAR; INTRAVENOUS at 09:11

## 2024-11-15 RX ADMIN — DEXMEDETOMIDINE HYDROCHLORIDE 0.7 MCG/KG/HR: 4 INJECTION INTRAVENOUS at 09:11

## 2024-11-15 RX ADMIN — POTASSIUM CHLORIDE 10 MEQ: 7.46 INJECTION, SOLUTION INTRAVENOUS at 10:11

## 2024-11-15 RX ADMIN — THIAMINE HYDROCHLORIDE 500 MG: 100 INJECTION, SOLUTION INTRAMUSCULAR; INTRAVENOUS at 02:11

## 2024-11-15 RX ADMIN — POTASSIUM CHLORIDE 10 MEQ: 7.46 INJECTION, SOLUTION INTRAVENOUS at 06:11

## 2024-11-15 RX ADMIN — DIAZEPAM 20 MG: 5 TABLET ORAL at 01:11

## 2024-11-15 NOTE — PLAN OF CARE
MICU DAILY GOALS     Family/Goals of care/Code Status   Code Status: Full Code    24H Vital Sign Range  Temp:  [96.5 °F (35.8 °C)-98.1 °F (36.7 °C)]   Pulse:  []   Resp:  [16-34]   BP: ()/(56-88)   SpO2:  [96 %-100 %]      Shift Events (include procedures and significant events)   Patient disoriented, agitated, anxious, and confused at start of shift, which has improved with adjustments to scheduled meds. Only one prn dose of ativan given this shift. This evening he began asking questions about what unit he's on, which is the first he's been able to identify his surroundings. Mental status and need to console improving. Restraints have been discontinued at this time.     AWAKE RASS: Goal - RASS Goal: 0-->alert and calm  Actual - RASS ([place] Agitation-Sedation Scale): alert and calm    Restraint necessity:  discontinued at this time   BREATHE SBT: Not intubated    Coordinate A & B, analgesics/sedatives Pain: managed   SAT: Not intubated   Delirium CAM-ICU: Overall CAM-ICU: Positive   Early(intubated/ Progressive (non-intubated) Mobility MOVE Screen (INTUBATED ONLY): Not intubated    Activity: Activity Management: Rolling - L1   Feeding/Nutrition Diet order: Diet/Nutrition Received: NPO,     Thrombus DVT prophylaxis: VTE Core Measure: Pharmacological prophylaxis initiated/maintained   HOB Elevation Head of Bed (HOB) Positioning: HOB elevated   Ulcer Prophylaxis GI: yes   Glucose control managed Glycemic Management: blood glucose monitored   Skin Skin assessment:     Sacrum intact/not altered? Yes  Heels intact/not altered? Yes  Surgical wound? No    CHECK ONE!   (no altered skin or altered skin) and sub boxes:  [x] No Altered Skin Integrity Present    [x]Prevention Measures Documented    [] Altered Skin Integrity Present or Discovered   [] LDA present in EPIC, daily doc completed              [] LDA added if not in EPIC (describe wound).                    When describing wound, do not stage, use  Recommend Mini lower lift, + platysmaplasty, post-tragel, SMAS to NLF, lid cheek junction, chin (discussed risks and benefits of sx. .. ). descriptive words only.    [] Wound Image Taken (required on admit,                   transfer/discharge and every Tuesday)    Wound Care Consulted? No   Bowel Function no issues    Indwelling Catheter Necessity    N/A   De-escalation Antibiotics Yes        VS and assessment per flow sheet, patient progressing towards goals as tolerated, plan of care reviewed with  patient and daughter , all concerns addressed, will continue to monitor.

## 2024-11-15 NOTE — PLAN OF CARE
[facility] - Medical ICU  Initial Discharge Assessment       Primary Care Provider: Bacilio Larry MD    Admission Diagnosis: Atrial fibrillation [I48.91]  SOB (shortness of breath) [R06.02]  Tachycardia [R00.0]  Chest pain [R07.9]  Alcohol withdrawal syndrome, with delirium [F10.931]    Admission Date: 11/14/2024  Expected Discharge Date:     Transition of Care Barriers: Substance Abuse, Does not adhere to care plan    Payor: [employer] MANAGED MEDICARE / Plan: [employer] MEDICARE HMO / Product Type: Capitation /     Extended Emergency Contact Information  Primary Emergency Contact: Chani Gleason  Address: [id number] [place] [address] [place] [address]  Home Phone: [phone]  Relation: Daughter  Secondary Emergency Contact: Rafita Gleason  Address: [address] [address] [place] [employer]  [place]  Mobile Phone: [phone]  Relation: Relative    Discharge Plan A: Home  Discharge Plan B: Home with family      [id number] [place] [facility]  AT [facility]  [address] [place] [address]  Phone: [phone] Fax: [phone]      Initial Assessment (most recent)       Adult Discharge Assessment - 11/15/24 1121          Discharge Assessment    Assessment Type Discharge Planning Assessment     Confirmed/corrected address, phone number and insurance Yes     Confirmed Demographics Correct on Facesheet     Source of Information family     If unable to respond/provide information was family/caregiver contacted? Yes     Contact Name/Number Chani Gleason Daughter [phone]     Does patient/caregiver understand observation status Yes     Communicated DALY with patient/caregiver Yes;Date not available/Unable to determine     Reason For Admission Alcohol withdrawal syndrome with complication     People in Home alone     Do you expect to return to your current living situation? Yes     Do you have help at home or someone to help  you manage your care at home? Yes     Who are your caregiver(s) and their phone number(s)? Chani Gleason Daughter [phone]     Prior to hospitilization cognitive status: Unable to Assess     Current cognitive status: Unable to Assess     Walking or Climbing Stairs Difficulty yes     Walking or Climbing Stairs ambulation difficulty, requires equipment     Dressing/Bathing Difficulty no     Equipment Currently Used at Home walker, rolling     Readmission within 30 days? No     Patient currently being followed by outpatient case management? Yes     If yes, name of outpatient case management following: [facility] outpatient case management     Do you currently have service(s) that help you manage your care at home? No     Do you take prescription medications? Yes     Do you have prescription coverage? Yes     Coverage Payor: [employer] MANAGED MEDICARE - [employer] MEDICARE HMO - CAPITATED     Do you have any problems affording any of your prescribed medications? No     Is the patient taking medications as prescribed? no     How do you get to doctors appointments? family or friend will provide     Are you on dialysis? No     Do you take coumadin? No     Discharge Plan A Home     Discharge Plan B Home with family     DME Needed Upon Discharge  other (see comments)   TBD    Discharge Plan discussed with: Adult children     Transition of Care Barriers Substance Abuse;Does not adhere to care plan                   SW called patients daughter//patient sleeping. Pt lives at home alone. Post hospital  stay patients daughter will be pt support person and pt. has transportation at d/c with his daughter. There have been no hospitalizations within the last 30 days per pts health record. Verified pt PCP and preferred pharmacy. Pt not on Coumadin and is not receiving dialysis. All questions answered regarding case management/ discharge planning , pts daughter verbalized understanding.     Per patients daughter, patient has a long history  of drug and alcohol abuse. Patient newly dx with alcohol induced Dementia. Patient is non complaint with medication regimens and is very paranoid. Daughter has been trying to convince patient to [place] [facility] [place] [place] but he refuses. Apparently patients house is hoarded and unsanitary. Daughter took patients keys to his car away due to his recent altered mental status.     Discharge Plan A and Plan B have been determined by review of patient's clinical status, future medical and therapeutic needs, and coverage/benefits for post-acute care in coordination with multidisciplinary team members.      Leta Ndiaye, JOSSY  [facility]   Ext: [phone]

## 2024-11-15 NOTE — ASSESSMENT & PLAN NOTE
Results for orders placed during the hospital encounter of 11/14/24  Echo  Interpretation Summary    Left Ventricle: The left ventricle is normal in size. Normal wall thickness. There is concentric remodeling. There is low normal systolic function with a visually estimated ejection fraction of 50 - 55%. There is normal diastolic function.    Right Ventricle: Normal right ventricular cavity size. Wall thickness is normal. Systolic function is normal.    Mild biatrial enlargement    Aortic Valve: The aortic valve is a trileaflet valve. There is mild aortic valve sclerosis.    Pulmonary Artery: The estimated pulmonary artery systolic pressure is 25 mmHg.    IVC/SVC: Normal venous pressure at 3 mmHg.    -- No evidence of acute exacerbation

## 2024-11-15 NOTE — PROGRESS NOTES
[facility] - Medical ICU  Critical Care Medicine  Progress Note    Patient Name: Donald Warren Abadie  MRN: [id number]  Admission Date: 11/14/2024  Hospital Length of Stay: 1 days  Code Status: Full Code  Attending Provider: Perry Sprign MD  Primary Care Provider: Bacilio Larry MD   Principal Problem: Alcohol withdrawal syndrome with complication    Subjective:     HPI:  Donald Abadie is a 69-year-old male with a past medical history of Afib, gout, anxiety, left sided RCC s/p partial nephrectomy (2014), HTN, HLD, subdural hematoma (9/20/2024), and alcoholism who presents to the ED with concern of alcohol withdrawal. He has been trying to cut down on his drinking after a recent fall with a head injury and subdural but typically drinks about 5-6 beers daily. His daughter came to his house because he was short of breath and was complaining that his heart was racing. She is a nurse and found him in Afib with elevated rate. He fell the night prior to admission as well. She is worried about him being able to detox at home. He states that he is feeling short of breath and anxious. He had minimal confusion at the time of arrival to the ED.    In the ED, , Na 122, Cl 90, CO2 20, glucose 130, BUN 6, Creatinine 1.6, T.Bili 1.6, AST 67, Magnesium 1.3. CT head with no evidence of acute intracranial hemorrhage. At the time of MICU evaluation the patient is tachycardic, tachypneic, and diaphoretic despite 4 mg IV ativan and 5 mg PO diazepam. He exhibits hallucinations and nonsensical speech upon evaluation.    Hospital/ICU Course:  The patient was admitted to hospital medicine for further management of his acute alcohol withdrawals. He was started on scheduled and as needed benzodiazepines as well as a precedex gtt. His hyponatremia was trended and treated with IV fluids and holding his home SSRI.    Interval History/Significant Events: No acute overnight events. Patient requiring frequent PRN dosing due to elevated  CIWA score (tachycardia, diaphoresis, agitation). Will increase scheduled diazepam, continue to titrate PRN ativan and precedex gtt.    Review of Systems   Constitutional:  Positive for diaphoresis and fatigue. Negative for fever.   HENT:  Negative for congestion, drooling, facial swelling and nosebleeds.    Eyes:  Negative for discharge and redness.   Respiratory:  Negative for apnea, cough, shortness of breath and wheezing.    Cardiovascular:  Negative for chest pain.   Gastrointestinal:  Negative for abdominal distention and abdominal pain.   Genitourinary:  Negative for dysuria and flank pain.   Musculoskeletal:  Negative for arthralgias and joint swelling.   Skin:  Negative for wound.   Neurological:  Positive for weakness. Negative for dizziness, seizures, speech difficulty, light-headedness and numbness.   Psychiatric/Behavioral:  Positive for agitation, behavioral problems and confusion. Negative for self-injury.      Objective:     Vital Signs (Most Recent):  Temp: 97.1 °F (36.2 °C) (11/15/24 1201)  Pulse: 80 (11/15/24 1201)  Resp: (!) 22 (11/15/24 1201)  BP: 136/82 (11/15/24 1201)  SpO2: 98 % (11/15/24 1201) Vital Signs (24h Range):  Temp:  [96.5 °F (35.8 °C)-98.1 °F (36.7 °C)] 97.1 °F (36.2 °C)  Pulse:  [] 80  Resp:  [16-34] 22  SpO2:  [96 %-100 %] 98 %  BP: ()/(55-82) 136/82   Weight: 77 kg (169 lb 12.1 oz)  Body mass index is 33.15 kg/m².      Intake/Output Summary (Last 24 hours) at 11/15/2024 1247  Last data filed at 11/15/2024 1201  Gross per 24 hour   Intake 3113.96 ml   Output 700 ml   Net 2413.96 ml          Physical Exam  Constitutional:       Appearance: He is ill-appearing and diaphoretic.      Comments: Re-directable   Eyes:      Extraocular Movements: Extraocular movements intact.      Pupils: Pupils are equal, round, and reactive to light.   Cardiovascular:      Rate and Rhythm: Tachycardia present. Rhythm irregular.      Pulses: Normal pulses.   Pulmonary:      Effort: Pulmonary  "effort is normal. No respiratory distress.      Breath sounds: Normal breath sounds.   Abdominal:      General: There is no distension.      Palpations: Abdomen is soft.      Tenderness: There is no abdominal tenderness. There is no guarding.   Musculoskeletal:         General: No swelling.   Skin:     General: Skin is warm.   Neurological:      Mental Status: He is alert. He is disoriented.      Comments: Tremors, restless   Psychiatric:      Comments: Confused, agitated at times            Vents:     Lines/Drains/Airways       Drain  Duration             Male External Urinary Catheter 11/14/24 1800 <1 day              Peripheral Intravenous Line  Duration                  Peripheral IV - Single Lumen 11/14/24 1151 20 G Anterior;Left Forearm 1 day         Peripheral IV - Single Lumen 11/14/24 1650 20 G Anterior;Right Forearm <1 day         Peripheral IV - Single Lumen 11/14/24 1746 20 G Right Hand <1 day                  Significant Labs:    CBC/Anemia Profile:  Recent Labs   Lab 11/14/24  1152 11/15/24  0353   WBC 9.95 5.42   HGB 15.1 14.3   HCT 43.0 40.1    100*   MCV 91 92   RDW 13.1 13.3        Chemistries:  Recent Labs   Lab 11/14/24  1152 11/14/24  1429 11/15/24  0353 11/15/24  1214   * 125* 125* 128*   K 4.3 3.7 3.1* 5.0   CL 90* 93* 97 100   CO2 20* 18* 18* 20*   BUN 6* 7* 8 9   CREATININE 1.6* 1.4 1.1 1.1   CALCIUM 8.7 8.3* 8.5* 8.1*   ALBUMIN 4.0  --  3.3*  --    PROT 7.0  --  5.9*  --    BILITOT 1.6*  --  1.3*  --    ALKPHOS 74  --  64  --    ALT 27  --  21  --    AST 67*  --  47*  --    MG 1.3*  --  2.0  --    PHOS  --   --  3.6  --      All pertinent labs within the past 24 hours have been reviewed.    Significant Imaging:  I have reviewed all pertinent imaging results/findings within the past 24 hours.    Assessment/Plan:     Psychiatric  * Alcohol withdrawal syndrome with complication  Pt presented to ED with c/o shortness of breath and "heart racing". Has been trying to cut back on " alcohol use. Now in alcohol withdrawals.     11/15: Patient required 14 mg PRN ativan since admission in addition to scheduled diazepam. Plan to increase scheduled diazepam, continue titrating PRN ativan and precedex gtt.    -- Will increase Diaxepam to 20 mg TID  -- Continue PRN ativan 2 mg for CIWA > 8  -- Continue precedex gtt; wean as tolerated  --Daily thiamine, folic acid, multivitamin  --CIWA protocol q4hrs      Cardiac/Vascular  Heart failure with mildly reduced ejection fraction (HFmrEF)  Results for orders placed during the hospital encounter of 11/14/24  Echo  Interpretation Summary    Left Ventricle: The left ventricle is normal in size. Normal wall thickness. There is concentric remodeling. There is low normal systolic function with a visually estimated ejection fraction of 50 - 55%. There is normal diastolic function.    Right Ventricle: Normal right ventricular cavity size. Wall thickness is normal. Systolic function is normal.    Mild biatrial enlargement    Aortic Valve: The aortic valve is a trileaflet valve. There is mild aortic valve sclerosis.    Pulmonary Artery: The estimated pulmonary artery systolic pressure is 25 mmHg.    IVC/SVC: Normal venous pressure at 3 mmHg.    -- No evidence of acute exacerbation    Paroxysmal atrial fibrillation  Hx of atrial fibrillation    --Continue home dronedarone  -- Continue home eliquis  -- Tele    Renal/  JASS (acute kidney injury)  Baseline Cr- 1.0. Cr- 1.6 BUN-6 on arrival. Collapsible IVC seen on POCUS.    Improved with IV fluids.    Lab Results   Component Value Date    CREATININE 1.1 11/15/2024     --Qd CMP  -- Avoid nephrotoxic medications as able    Endocrine  Type 2 diabetes mellitus without complication, without long-term current use of insulin  HgbA1c 5.9 (8 months ago)    --POC glucose AC/HS  --Low dose Sliding scale insulin  --Hypoglycemia protocol    Hyponatremia  Na 122 on admission. Serum osm 264, Urine osm 409, Urine Na 30. Not fully  explained by alcohol use / beer potomania, suspect component of SIADH as well.    --Daily CMP  -- Hold SSRI       Critical Care Daily Checklist:    A: Awake: RASS Goal/Actual Goal: RASS Goal: 0-->alert and calm  Actual:     B: Spontaneous Breathing Trial Performed?     C: SAT & SBT Coordinated?  N/A                      D: Delirium: CAM-ICU Overall CAM-ICU: Positive   E: Early Mobility Performed? Yes   F: Feeding Goal:    Status:     Current Diet Order   Procedures    Diet NPO Except for: Sips with Medication     Order Specific Question:   Except for:     Answer:   Sips with Medication      AS: Analgesia/Sedation Precedex gtt   T: Thromboembolic Prophylaxis eliquis   H: HOB > 300 Yes   U: Stress Ulcer Prophylaxis (if needed) N/A   G: Glucose Control SSI   B: Bowel Function     I: Indwelling Catheter (Lines & Lynn) Necessity Peripheral IV's, Condom cath   D: De-escalation of Antimicrobials/Pharmacotherapies N/A    Plan for the day/ETD Alcohol withdrawal management    Code Status:  Family/Goals of Care: Full Code         Critical secondary to Patient has a condition that poses threat to life and bodily function: Severe alcohol withdrawals      Critical care was time spent personally by me on the following activities: development of treatment plan with patient or surrogate and bedside caregivers, discussions with consultants, evaluation of patient's response to treatment, examination of patient, ordering and performing treatments and interventions, ordering and review of laboratory studies, ordering and review of radiographic studies, pulse oximetry, re-evaluation of patient's condition. This critical care time did not overlap with that of any other provider or involve time for any procedures.     Isauro Baltazar MD  Critical Care Medicine  [facility] - Medical ICU

## 2024-11-15 NOTE — ASSESSMENT & PLAN NOTE
Na 122 on admission. Serum osm 264, Urine osm 409, Urine Na 30. Not fully explained by alcohol use / beer potomania, suspect component of SIADH as well.    --Daily CMP  -- Hold SSRI

## 2024-11-15 NOTE — PLAN OF CARE
MICU DAILY GOALS     Family/Goals of care/Code Status   Code Status: Full Code    24H Vital Sign Range  Temp:  [96.5 °F (35.8 °C)-98.1 °F (36.7 °C)]   Pulse:  []   Resp:  [16-20]   BP: ()/(55-80)   SpO2:  [96 %-100 %]      Shift Events (include procedures and significant events)   Precedex titrated according to MAR. Electrolytes replaced.     AWAKE RASS: Goal - RASS Goal: 0-->alert and calm  Actual - RASS ([place]idan Agitation-Sedation Scale): alert and calm    Restraint necessity: Climbing out of bed   BREATHE SBT: Not intubated    Coordinate A & B, analgesics/sedatives Pain: managed   SAT: Not intubated   Delirium CAM-ICU: Overall CAM-ICU: Positive   Early(intubated/ Progressive (non-intubated) Mobility MOVE Screen (INTUBATED ONLY): Not intubated    Activity: Activity Management: Patient unable to perform activities   Feeding/Nutrition Diet order: Diet/Nutrition Received: NPO,     Thrombus DVT prophylaxis: VTE Core Measure: Pharmacological prophylaxis initiated/maintained   HOB Elevation Head of Bed (HOB) Positioning: HOB at 30 degrees   Ulcer Prophylaxis GI: yes   Glucose control managed Glycemic Management: blood glucose monitored   Skin Skin assessment:     Sacrum intact/not altered? Yes  Heels intact/not altered? Yes  Surgical wound? No    CHECK ONE!   (no altered skin or altered skin) and sub boxes:  [x] No Altered Skin Integrity Present    []Prevention Measures Documented    [] Altered Skin Integrity Present or Discovered   [] LDA present in EPIC, daily doc completed              [] LDA added if not in EPIC (describe wound).                    When describing wound, do not stage, use descriptive words only.    [] Wound Image Taken (required on admit,                   transfer/discharge and every Tuesday)    Wound Care Consulted? No   Bowel Function no issues    Indwelling Catheter Necessity            De-escalation Antibiotics No        VS and assessment per flow sheet, patient progressing towards  goals as tolerated, plan of care reviewed with  pt , all concerns addressed.

## 2024-11-15 NOTE — ASSESSMENT & PLAN NOTE
"Pt presented to ED with c/o shortness of breath and "heart racing". Has been trying to cut back on alcohol use. Now in alcohol withdrawals.     11/15: Patient required 14 mg PRN ativan since admission in addition to scheduled diazepam. Plan to increase scheduled diazepam, continue titrating PRN ativan and precedex gtt.    -- Will increase Diaxepam to 20 mg TID  -- Continue PRN ativan 2 mg for CIWA > 8  -- Continue precedex gtt; wean as tolerated  --Daily thiamine, folic acid, multivitamin  --CIWA protocol q4hrs    "

## 2024-11-15 NOTE — SUBJECTIVE & OBJECTIVE
Interval History/Significant Events: No acute overnight events. Patient requiring frequent PRN dosing due to elevated CIWA score (tachycardia, diaphoresis, agitation). Will increase scheduled diazepam, continue to titrate PRN ativan and precedex gtt.    Review of Systems   Constitutional:  Positive for diaphoresis and fatigue. Negative for fever.   HENT:  Negative for congestion, drooling, facial swelling and nosebleeds.    Eyes:  Negative for discharge and redness.   Respiratory:  Negative for apnea, cough, shortness of breath and wheezing.    Cardiovascular:  Negative for chest pain.   Gastrointestinal:  Negative for abdominal distention and abdominal pain.   Genitourinary:  Negative for dysuria and flank pain.   Musculoskeletal:  Negative for arthralgias and joint swelling.   Skin:  Negative for wound.   Neurological:  Positive for weakness. Negative for dizziness, seizures, speech difficulty, light-headedness and numbness.   Psychiatric/Behavioral:  Positive for agitation, behavioral problems and confusion. Negative for self-injury.      Objective:     Vital Signs (Most Recent):  Temp: 97.1 °F (36.2 °C) (11/15/24 1201)  Pulse: 80 (11/15/24 1201)  Resp: (!) 22 (11/15/24 1201)  BP: 136/82 (11/15/24 1201)  SpO2: 98 % (11/15/24 1201) Vital Signs (24h Range):  Temp:  [96.5 °F (35.8 °C)-98.1 °F (36.7 °C)] 97.1 °F (36.2 °C)  Pulse:  [] 80  Resp:  [16-34] 22  SpO2:  [96 %-100 %] 98 %  BP: ()/(55-82) 136/82   Weight: 77 kg (169 lb 12.1 oz)  Body mass index is 33.15 kg/m².      Intake/Output Summary (Last 24 hours) at 11/15/2024 1247  Last data filed at 11/15/2024 1201  Gross per 24 hour   Intake 3113.96 ml   Output 700 ml   Net 2413.96 ml          Physical Exam  Constitutional:       Appearance: He is ill-appearing and diaphoretic.      Comments: Re-directable   Eyes:      Extraocular Movements: Extraocular movements intact.      Pupils: Pupils are equal, round, and reactive to light.   Cardiovascular:       Rate and Rhythm: Tachycardia present. Rhythm irregular.      Pulses: Normal pulses.   Pulmonary:      Effort: Pulmonary effort is normal. No respiratory distress.      Breath sounds: Normal breath sounds.   Abdominal:      General: There is no distension.      Palpations: Abdomen is soft.      Tenderness: There is no abdominal tenderness. There is no guarding.   Musculoskeletal:         General: No swelling.   Skin:     General: Skin is warm.   Neurological:      Mental Status: He is alert. He is disoriented.      Comments: Tremors, restless   Psychiatric:      Comments: Confused, agitated at times            Vents:     Lines/Drains/Airways       Drain  Duration             Male External Urinary Catheter 11/14/24 1800 <1 day              Peripheral Intravenous Line  Duration                  Peripheral IV - Single Lumen 11/14/24 1151 20 G Anterior;Left Forearm 1 day         Peripheral IV - Single Lumen 11/14/24 1650 20 G Anterior;Right Forearm <1 day         Peripheral IV - Single Lumen 11/14/24 1746 20 G Right Hand <1 day                  Significant Labs:    CBC/Anemia Profile:  Recent Labs   Lab 11/14/24  1152 11/15/24  0353   WBC 9.95 5.42   HGB 15.1 14.3   HCT 43.0 40.1    100*   MCV 91 92   RDW 13.1 13.3        Chemistries:  Recent Labs   Lab 11/14/24  1152 11/14/24  1429 11/15/24  0353 11/15/24  1214   * 125* 125* 128*   K 4.3 3.7 3.1* 5.0   CL 90* 93* 97 100   CO2 20* 18* 18* 20*   BUN 6* 7* 8 9   CREATININE 1.6* 1.4 1.1 1.1   CALCIUM 8.7 8.3* 8.5* 8.1*   ALBUMIN 4.0  --  3.3*  --    PROT 7.0  --  5.9*  --    BILITOT 1.6*  --  1.3*  --    ALKPHOS 74  --  64  --    ALT 27  --  21  --    AST 67*  --  47*  --    MG 1.3*  --  2.0  --    PHOS  --   --  3.6  --      All pertinent labs within the past 24 hours have been reviewed.    Significant Imaging:  I have reviewed all pertinent imaging results/findings within the past 24 hours.

## 2024-11-15 NOTE — HOSPITAL COURSE
The patient was admitted to hospital medicine for further management of his acute alcohol withdrawals. He was started on scheduled and as needed benzodiazepines as well as a precedex gtt. His hyponatremia was trended and treated with IV fluids and holding his home SSRI. His withdrawal symptoms improved and precedex was weaned off. He was continued on a valium taper with PRN ativan as needed.

## 2024-11-15 NOTE — ASSESSMENT & PLAN NOTE
Baseline Cr- 1.0. Cr- 1.6 BUN-6 on arrival. Collapsible IVC seen on POCUS.    Improved with IV fluids.    Lab Results   Component Value Date    CREATININE 1.1 11/15/2024     --Qd CMP  -- Avoid nephrotoxic medications as able

## 2024-11-16 PROBLEM — N17.9 AKI (ACUTE KIDNEY INJURY): Status: RESOLVED | Noted: 2024-04-02 | Resolved: 2024-11-16

## 2024-11-16 PROBLEM — I95.1 ORTHOSTATIC HYPOTENSION: Status: ACTIVE | Noted: 2024-11-16

## 2024-11-16 PROBLEM — D69.6 THROMBOCYTOPENIA: Status: ACTIVE | Noted: 2024-11-16

## 2024-11-16 PROBLEM — R29.6 FALLS FREQUENTLY: Status: ACTIVE | Noted: 2024-11-16

## 2024-11-16 LAB
ALBUMIN SERPL BCP-MCNC: 3.5 G/DL (ref 3.5–5.2)
ALP SERPL-CCNC: 63 U/L (ref 40–150)
ALT SERPL W/O P-5'-P-CCNC: 22 U/L (ref 10–44)
ANION GAP SERPL CALC-SCNC: 14 MMOL/L (ref 8–16)
ANION GAP SERPL CALC-SCNC: 6 MMOL/L (ref 8–16)
ANION GAP SERPL CALC-SCNC: 7 MMOL/L (ref 8–16)
AST SERPL-CCNC: 38 U/L (ref 10–40)
BASOPHILS # BLD AUTO: 0.06 K/UL (ref 0–0.2)
BASOPHILS NFR BLD: 1 % (ref 0–1.9)
BILIRUB SERPL-MCNC: 1.2 MG/DL (ref 0.1–1)
BUN SERPL-MCNC: 7 MG/DL (ref 8–23)
BUN SERPL-MCNC: 9 MG/DL (ref 8–23)
BUN SERPL-MCNC: 9 MG/DL (ref 8–23)
CALCIUM SERPL-MCNC: 7 MG/DL (ref 8.7–10.5)
CALCIUM SERPL-MCNC: 8.8 MG/DL (ref 8.7–10.5)
CALCIUM SERPL-MCNC: 8.9 MG/DL (ref 8.7–10.5)
CHLORIDE SERPL-SCNC: 100 MMOL/L (ref 95–110)
CHLORIDE SERPL-SCNC: 101 MMOL/L (ref 95–110)
CHLORIDE SERPL-SCNC: 105 MMOL/L (ref 95–110)
CO2 SERPL-SCNC: 14 MMOL/L (ref 23–29)
CO2 SERPL-SCNC: 17 MMOL/L (ref 23–29)
CO2 SERPL-SCNC: 21 MMOL/L (ref 23–29)
CREAT SERPL-MCNC: 0.8 MG/DL (ref 0.5–1.4)
CREAT SERPL-MCNC: 1 MG/DL (ref 0.5–1.4)
CREAT SERPL-MCNC: 1.1 MG/DL (ref 0.5–1.4)
DIFFERENTIAL METHOD BLD: ABNORMAL
EOSINOPHIL # BLD AUTO: 0.1 K/UL (ref 0–0.5)
EOSINOPHIL NFR BLD: 1 % (ref 0–8)
ERYTHROCYTE [DISTWIDTH] IN BLOOD BY AUTOMATED COUNT: 13.5 % (ref 11.5–14.5)
EST. GFR  (NO RACE VARIABLE): >60 ML/MIN/1.73 M^2
GLUCOSE SERPL-MCNC: 106 MG/DL (ref 70–110)
GLUCOSE SERPL-MCNC: 152 MG/DL (ref 70–110)
GLUCOSE SERPL-MCNC: 92 MG/DL (ref 70–110)
HCT VFR BLD AUTO: 38.6 % (ref 40–54)
HGB BLD-MCNC: 13.4 G/DL (ref 14–18)
IMM GRANULOCYTES # BLD AUTO: 0.02 K/UL (ref 0–0.04)
IMM GRANULOCYTES NFR BLD AUTO: 0.3 % (ref 0–0.5)
LYMPHOCYTES # BLD AUTO: 1.3 K/UL (ref 1–4.8)
LYMPHOCYTES NFR BLD: 20.4 % (ref 18–48)
MAGNESIUM SERPL-MCNC: 1.5 MG/DL (ref 1.6–2.6)
MCH RBC QN AUTO: 32.5 PG (ref 27–31)
MCHC RBC AUTO-ENTMCNC: 34.7 G/DL (ref 32–36)
MCV RBC AUTO: 94 FL (ref 82–98)
MONOCYTES # BLD AUTO: 0.5 K/UL (ref 0.3–1)
MONOCYTES NFR BLD: 8.8 % (ref 4–15)
NEUTROPHILS # BLD AUTO: 4.2 K/UL (ref 1.8–7.7)
NEUTROPHILS NFR BLD: 68.5 % (ref 38–73)
NRBC BLD-RTO: 0 /100 WBC
PHOSPHATE SERPL-MCNC: 2.8 MG/DL (ref 2.7–4.5)
PLATELET # BLD AUTO: 95 K/UL (ref 150–450)
PMV BLD AUTO: 8.8 FL (ref 9.2–12.9)
POCT GLUCOSE: 103 MG/DL (ref 70–110)
POCT GLUCOSE: 129 MG/DL (ref 70–110)
POCT GLUCOSE: 78 MG/DL (ref 70–110)
POCT GLUCOSE: 98 MG/DL (ref 70–110)
POTASSIUM SERPL-SCNC: 3.3 MMOL/L (ref 3.5–5.1)
POTASSIUM SERPL-SCNC: 4.6 MMOL/L (ref 3.5–5.1)
POTASSIUM SERPL-SCNC: 4.8 MMOL/L (ref 3.5–5.1)
PROT SERPL-MCNC: 6.2 G/DL (ref 6–8.4)
RBC # BLD AUTO: 4.12 M/UL (ref 4.6–6.2)
SODIUM SERPL-SCNC: 127 MMOL/L (ref 136–145)
SODIUM SERPL-SCNC: 129 MMOL/L (ref 136–145)
SODIUM SERPL-SCNC: 129 MMOL/L (ref 136–145)
WBC # BLD AUTO: 6.17 K/UL (ref 3.9–12.7)

## 2024-11-16 PROCEDURE — 63600175 PHARM REV CODE 636 W HCPCS: Mod: HCNC

## 2024-11-16 PROCEDURE — 80048 BASIC METABOLIC PNL TOTAL CA: CPT | Mod: HCNC,XB

## 2024-11-16 PROCEDURE — 80053 COMPREHEN METABOLIC PANEL: CPT | Mod: HCNC

## 2024-11-16 PROCEDURE — 20600001 HC STEP DOWN PRIVATE ROOM: Mod: HCNC

## 2024-11-16 PROCEDURE — 84100 ASSAY OF PHOSPHORUS: CPT | Mod: HCNC

## 2024-11-16 PROCEDURE — 85025 COMPLETE CBC W/AUTO DIFF WBC: CPT | Mod: HCNC

## 2024-11-16 PROCEDURE — 94761 N-INVAS EAR/PLS OXIMETRY MLT: CPT | Mod: HCNC

## 2024-11-16 PROCEDURE — 80048 BASIC METABOLIC PNL TOTAL CA: CPT | Mod: 91,HCNC,XB

## 2024-11-16 PROCEDURE — 83735 ASSAY OF MAGNESIUM: CPT | Mod: HCNC

## 2024-11-16 PROCEDURE — 25000003 PHARM REV CODE 250: Mod: HCNC

## 2024-11-16 PROCEDURE — 99233 SBSQ HOSP IP/OBS HIGH 50: CPT | Mod: HCNC,GC,, | Performed by: EMERGENCY MEDICINE

## 2024-11-16 RX ORDER — SODIUM BICARBONATE 650 MG/1
1300 TABLET ORAL 3 TIMES DAILY
Status: CANCELLED | OUTPATIENT
Start: 2024-11-16

## 2024-11-16 RX ORDER — MAGNESIUM SULFATE HEPTAHYDRATE 40 MG/ML
2 INJECTION, SOLUTION INTRAVENOUS ONCE
Status: COMPLETED | OUTPATIENT
Start: 2024-11-16 | End: 2024-11-16

## 2024-11-16 RX ORDER — FOLIC ACID 1 MG/1
1 TABLET ORAL DAILY
Status: DISCONTINUED | OUTPATIENT
Start: 2024-11-17 | End: 2024-11-20 | Stop reason: HOSPADM

## 2024-11-16 RX ORDER — DIAZEPAM 5 MG/1
20 TABLET ORAL EVERY 8 HOURS
Status: COMPLETED | OUTPATIENT
Start: 2024-11-16 | End: 2024-11-16

## 2024-11-16 RX ORDER — DIAZEPAM 5 MG/1
5 TABLET ORAL DAILY
Status: COMPLETED | OUTPATIENT
Start: 2024-11-20 | End: 2024-11-20

## 2024-11-16 RX ORDER — DIAZEPAM 5 MG/1
5 TABLET ORAL EVERY 12 HOURS
Status: COMPLETED | OUTPATIENT
Start: 2024-11-19 | End: 2024-11-19

## 2024-11-16 RX ORDER — DIAZEPAM 5 MG/1
10 TABLET ORAL EVERY 12 HOURS
Status: COMPLETED | OUTPATIENT
Start: 2024-11-18 | End: 2024-11-18

## 2024-11-16 RX ORDER — POTASSIUM CHLORIDE 7.45 MG/ML
10 INJECTION INTRAVENOUS
Status: DISCONTINUED | OUTPATIENT
Start: 2024-11-16 | End: 2024-11-16

## 2024-11-16 RX ORDER — DIAZEPAM 5 MG/1
10 TABLET ORAL EVERY 8 HOURS
Status: COMPLETED | OUTPATIENT
Start: 2024-11-17 | End: 2024-11-17

## 2024-11-16 RX ADMIN — DICLOFENAC SODIUM 2 G: 10 GEL TOPICAL at 07:11

## 2024-11-16 RX ADMIN — SODIUM BICARBONATE: 84 INJECTION, SOLUTION INTRAVENOUS at 04:11

## 2024-11-16 RX ADMIN — LORAZEPAM 2 MG: 2 INJECTION INTRAMUSCULAR; INTRAVENOUS at 07:11

## 2024-11-16 RX ADMIN — DIAZEPAM 20 MG: 5 TABLET ORAL at 02:11

## 2024-11-16 RX ADMIN — MUPIROCIN: 20 OINTMENT TOPICAL at 08:11

## 2024-11-16 RX ADMIN — APIXABAN 5 MG: 5 TABLET, FILM COATED ORAL at 09:11

## 2024-11-16 RX ADMIN — MAGNESIUM SULFATE IN WATER 2 G: 40 INJECTION, SOLUTION INTRAVENOUS at 07:11

## 2024-11-16 RX ADMIN — DIAZEPAM 20 MG: 5 TABLET ORAL at 09:11

## 2024-11-16 RX ADMIN — PANTOPRAZOLE SODIUM 40 MG: 40 TABLET, DELAYED RELEASE ORAL at 08:11

## 2024-11-16 RX ADMIN — THERA TABS 1 TABLET: TAB at 08:11

## 2024-11-16 RX ADMIN — THIAMINE HYDROCHLORIDE 500 MG: 100 INJECTION, SOLUTION INTRAMUSCULAR; INTRAVENOUS at 09:11

## 2024-11-16 RX ADMIN — THIAMINE HYDROCHLORIDE 500 MG: 100 INJECTION, SOLUTION INTRAMUSCULAR; INTRAVENOUS at 10:11

## 2024-11-16 RX ADMIN — MUPIROCIN: 20 OINTMENT TOPICAL at 09:11

## 2024-11-16 RX ADMIN — DRONEDARONE 400 MG: 400 TABLET, FILM COATED ORAL at 08:11

## 2024-11-16 RX ADMIN — POTASSIUM CHLORIDE 10 MEQ: 7.46 INJECTION, SOLUTION INTRAVENOUS at 01:11

## 2024-11-16 RX ADMIN — POTASSIUM BICARBONATE 50 MEQ: 978 TABLET, EFFERVESCENT ORAL at 02:11

## 2024-11-16 RX ADMIN — THIAMINE HYDROCHLORIDE 500 MG: 100 INJECTION, SOLUTION INTRAMUSCULAR; INTRAVENOUS at 02:11

## 2024-11-16 RX ADMIN — APIXABAN 5 MG: 5 TABLET, FILM COATED ORAL at 08:11

## 2024-11-16 RX ADMIN — DIAZEPAM 20 MG: 5 TABLET ORAL at 06:11

## 2024-11-16 RX ADMIN — LORAZEPAM 2 MG: 2 INJECTION INTRAMUSCULAR; INTRAVENOUS at 05:11

## 2024-11-16 NOTE — PROGRESS NOTES
[facility] - Medical ICU  Critical Care Medicine  Progress Note    Patient Name: Donald Warren Abadie  MRN: [id number]  Admission Date: 11/14/2024  Hospital Length of Stay: 2 days  Code Status: Full Code  Attending Provider: Perry Spring MD  Primary Care Provider: Bacilio Larry MD   Principal Problem: Alcohol withdrawal syndrome with complication    Subjective:     HPI:  Donald Abadie is a 69-year-old male with a past medical history of Afib, gout, anxiety, left sided RCC s/p partial nephrectomy (2014), HTN, HLD, subdural hematoma (9/20/2024), and alcoholism who presents to the ED with concern of alcohol withdrawal. He has been trying to cut down on his drinking after a recent fall with a head injury and subdural but typically drinks about 5-6 beers daily. His daughter came to his house because he was short of breath and was complaining that his heart was racing. She is a nurse and found him in Afib with elevated rate. He fell the night prior to admission as well. She is worried about him being able to detox at home. He states that he is feeling short of breath and anxious. He had minimal confusion at the time of arrival to the ED.    In the ED, , Na 122, Cl 90, CO2 20, glucose 130, BUN 6, Creatinine 1.6, T.Bili 1.6, AST 67, Magnesium 1.3. CT head with no evidence of acute intracranial hemorrhage. At the time of MICU evaluation the patient is tachycardic, tachypneic, and diaphoretic despite 4 mg IV ativan and 5 mg PO diazepam. He exhibits hallucinations and nonsensical speech upon evaluation.    Hospital/ICU Course:  The patient was admitted to hospital medicine for further management of his acute alcohol withdrawals. He was started on scheduled and as needed benzodiazepines as well as a precedex gtt. His hyponatremia was trended and treated with IV fluids and holding his home SSRI. His withdrawal symptoms improved and precedex was weaned off. He was continued on a valium taper with PRN ativan as  needed.    Interval History/Significant Events: No acute overnight events. Patient only required 1 dose of PRN ativan 2 mg for CIWA > 8. Precedex gtt weaning.    Review of Systems   Constitutional:  Positive for fatigue. Negative for diaphoresis and fever.   HENT:  Negative for congestion, drooling, facial swelling and nosebleeds.    Eyes:  Negative for discharge and redness.   Respiratory:  Negative for apnea, cough, shortness of breath and wheezing.    Cardiovascular:  Negative for chest pain.   Gastrointestinal:  Negative for abdominal distention and abdominal pain.   Genitourinary:  Negative for dysuria and flank pain.   Musculoskeletal:  Negative for arthralgias and joint swelling.   Skin:  Negative for wound.   Neurological:  Positive for weakness. Negative for dizziness, seizures, speech difficulty, light-headedness and numbness.   Psychiatric/Behavioral:  Positive for confusion. Negative for agitation, behavioral problems and self-injury.      Objective:     Vital Signs (Most Recent):  Temp: 96.6 °F (35.9 °C) (11/16/24 0701)  Pulse: (!) 55 (11/16/24 0901)  Resp: (!) 21 (11/16/24 0901)  BP: (!) 113/51 (11/16/24 0901)  SpO2: 99 % (11/16/24 0901) Vital Signs (24h Range):  Temp:  [96.6 °F (35.9 °C)-99.4 °F (37.4 °C)] 96.6 °F (35.9 °C)  Pulse:  [] 55  Resp:  [16-32] 21  SpO2:  [96 %-100 %] 99 %  BP: ()/(51-88) 113/51   Weight: 77 kg (169 lb 12.1 oz)  Body mass index is 33.15 kg/m².      Intake/Output Summary (Last 24 hours) at 11/16/2024 0934  Last data filed at 11/16/2024 0901  Gross per 24 hour   Intake 1953.96 ml   Output 2490 ml   Net -536.04 ml          Physical Exam  Constitutional:       General: He is not in acute distress.     Appearance: He is not diaphoretic.   Eyes:      Extraocular Movements: Extraocular movements intact.      Pupils: Pupils are equal, round, and reactive to light.   Cardiovascular:      Rate and Rhythm: Normal rate. Rhythm irregular.      Pulses: Normal pulses.    Pulmonary:      Effort: Pulmonary effort is normal. No respiratory distress.      Breath sounds: Normal breath sounds.   Abdominal:      General: There is no distension.      Palpations: Abdomen is soft.      Tenderness: There is no abdominal tenderness. There is no guarding.   Musculoskeletal:         General: No swelling.   Skin:     General: Skin is warm.   Neurological:      Mental Status: He is alert and oriented to person, place, and time.   Psychiatric:      Comments: Confused, agitated at times            Vents:     Lines/Drains/Airways       Drain  Duration             Male External Urinary Catheter 11/14/24 1800 1 day              Peripheral Intravenous Line  Duration                  Peripheral IV - Single Lumen 11/14/24 1650 20 G Anterior;Right Forearm 1 day         Peripheral IV - Single Lumen 11/14/24 1746 20 G Right Hand 1 day                  Significant Labs:    CBC/Anemia Profile:  Recent Labs   Lab 11/14/24  1152 11/15/24  0353 11/16/24  0503   WBC 9.95 5.42 6.17   HGB 15.1 14.3 13.4*   HCT 43.0 40.1 38.6*    100* 95*   MCV 91 92 94   RDW 13.1 13.3 13.5        Chemistries:  Recent Labs   Lab 11/14/24  1152 11/14/24  1429 11/15/24  0353 11/15/24  1214 11/15/24  1853 11/16/24  0009 11/16/24  0503   *   < > 125*   < > 126* 129* 127*   K 4.3   < > 3.1*   < > 4.0 3.3* 4.6   CL 90*   < > 97   < > 100 105 100   CO2 20*   < > 18*   < > 18* 17* 21*   BUN 6*   < > 8   < > 8 7* 9   CREATININE 1.6*   < > 1.1   < > 1.0 0.8 1.0   CALCIUM 8.7   < > 8.5*   < > 8.2* 7.0* 8.9   ALBUMIN 4.0  --  3.3*  --   --   --  3.5   PROT 7.0  --  5.9*  --   --   --  6.2   BILITOT 1.6*  --  1.3*  --   --   --  1.2*   ALKPHOS 74  --  64  --   --   --  63   ALT 27  --  21  --   --   --  22   AST 67*  --  47*  --   --   --  38   MG 1.3*  --  2.0  --   --   --  1.5*   PHOS  --   --  3.6  --   --   --  2.8    < > = values in this interval not displayed.     All pertinent labs within the past 24 hours have been  "reviewed.    Significant Imaging:  I have reviewed all pertinent imaging results/findings within the past 24 hours.    ABG  No results for input(s): "PH", "PO2", "PCO2", "HCO3", "BE" in the last 168 hours.  Assessment/Plan:     Psychiatric  * Alcohol withdrawal syndrome with complication  Pt presented to ED with c/o shortness of breath and "heart racing". Has been trying to cut back on alcohol use. Now in alcohol withdrawals.     11/15: Patient required 14 mg PRN ativan since admission in addition to scheduled diazepam. Plan to increase scheduled diazepam, continue titrating PRN ativan and precedex gtt.  11/16: Precedex weaned off, required 1 dose of PRN ativan overnight.    -- Diazepam taper: 10 mg TID -> 10 mg BID -> 5 mg BID -> 5 mg daily -> stop  -- Continue PRN ativan 2 mg for CIWA > 8  -- Stop precedex gtt  --Daily thiamine, folic acid, multivitamin  --CIWA protocol q4hrs      Cardiac/Vascular  Heart failure with mildly reduced ejection fraction (HFmrEF)  Results for orders placed during the hospital encounter of 11/14/24  Echo  Interpretation Summary    Left Ventricle: The left ventricle is normal in size. Normal wall thickness. There is concentric remodeling. There is low normal systolic function with a visually estimated ejection fraction of 50 - 55%. There is normal diastolic function.    Right Ventricle: Normal right ventricular cavity size. Wall thickness is normal. Systolic function is normal.    Mild biatrial enlargement    Aortic Valve: The aortic valve is a trileaflet valve. There is mild aortic valve sclerosis.    Pulmonary Artery: The estimated pulmonary artery systolic pressure is 25 mmHg.    IVC/SVC: Normal venous pressure at 3 mmHg.    -- No evidence of acute exacerbation    Paroxysmal atrial fibrillation  Hx of atrial fibrillation    --Continue home dronedarone  -- Continue home eliquis  -- Tele     Endocrine  Type 2 diabetes mellitus without complication, without long-term current use of " insulin  HgbA1c 5.9 (8 months ago)    --POC glucose AC/HS  --Low dose Sliding scale insulin  --Hypoglycemia protocol     Hyponatremia  Na 122 on admission. Serum osm 264, Urine osm 409, Urine Na 30. Not fully explained by alcohol use / beer potomania, suspect component of SIADH as well.    Na improving but remains hyponatremic. Will check RUQ US to evaluate for cirrhosis given alcohol use history.    --Daily CMP  -- Hold SSRI  -- Follow-up RUQ US       Critical Care Daily Checklist:    A: Awake: RASS Goal/Actual Goal: RASS Goal: 0-->alert and calm  Actual:     B: Spontaneous Breathing Trial Performed?     C: SAT & SBT Coordinated?  N/A                      D: Delirium: CAM-ICU Overall CAM-ICU: Positive   E: Early Mobility Performed? Yes   F: Feeding Goal:    Status:     Current Diet Order   Procedures    Diet Adult Regular      AS: Analgesia/Sedation N/A   T: Thromboembolic Prophylaxis Eliquis   H: HOB > 300 Yes   U: Stress Ulcer Prophylaxis (if needed) N/A   G: Glucose Control SSI   B: Bowel Function Stool Occurrence: 1   I: Indwelling Catheter (Lines & Lynn) Necessity Condom cath   D: De-escalation of Antimicrobials/Pharmacotherapies N/A    Plan for the day/ETD Wean benzos    Code Status:  Family/Goals of Care: Full Code         Critical secondary to Patient has a condition that poses threat to life and bodily function: Acute alcohol withdrawal      Critical care was time spent personally by me on the following activities: development of treatment plan with patient or surrogate and bedside caregivers, discussions with consultants, evaluation of patient's response to treatment, examination of patient, ordering and performing treatments and interventions, ordering and review of laboratory studies, ordering and review of radiographic studies, pulse oximetry, re-evaluation of patient's condition. This critical care time did not overlap with that of any other provider or involve time for any procedures.     Isauro  MD Delaney  Critical Care Medicine  [facility] - Medical ICU

## 2024-11-16 NOTE — SUBJECTIVE & OBJECTIVE
Interval History/Significant Events: No acute overnight events. Patient only required 1 dose of PRN ativan 2 mg for CIWA > 8. Precedex gtt weaning.    Review of Systems   Constitutional:  Positive for fatigue. Negative for diaphoresis and fever.   HENT:  Negative for congestion, drooling, facial swelling and nosebleeds.    Eyes:  Negative for discharge and redness.   Respiratory:  Negative for apnea, cough, shortness of breath and wheezing.    Cardiovascular:  Negative for chest pain.   Gastrointestinal:  Negative for abdominal distention and abdominal pain.   Genitourinary:  Negative for dysuria and flank pain.   Musculoskeletal:  Negative for arthralgias and joint swelling.   Skin:  Negative for wound.   Neurological:  Positive for weakness. Negative for dizziness, seizures, speech difficulty, light-headedness and numbness.   Psychiatric/Behavioral:  Positive for confusion. Negative for agitation, behavioral problems and self-injury.      Objective:     Vital Signs (Most Recent):  Temp: 96.6 °F (35.9 °C) (11/16/24 0701)  Pulse: (!) 55 (11/16/24 0901)  Resp: (!) 21 (11/16/24 0901)  BP: (!) 113/51 (11/16/24 0901)  SpO2: 99 % (11/16/24 0901) Vital Signs (24h Range):  Temp:  [96.6 °F (35.9 °C)-99.4 °F (37.4 °C)] 96.6 °F (35.9 °C)  Pulse:  [] 55  Resp:  [16-32] 21  SpO2:  [96 %-100 %] 99 %  BP: ()/(51-88) 113/51   Weight: 77 kg (169 lb 12.1 oz)  Body mass index is 33.15 kg/m².      Intake/Output Summary (Last 24 hours) at 11/16/2024 0934  Last data filed at 11/16/2024 0901  Gross per 24 hour   Intake 1953.96 ml   Output 2490 ml   Net -536.04 ml          Physical Exam  Constitutional:       General: He is not in acute distress.     Appearance: He is not diaphoretic.   Eyes:      Extraocular Movements: Extraocular movements intact.      Pupils: Pupils are equal, round, and reactive to light.   Cardiovascular:      Rate and Rhythm: Normal rate. Rhythm irregular.      Pulses: Normal pulses.   Pulmonary:       Effort: Pulmonary effort is normal. No respiratory distress.      Breath sounds: Normal breath sounds.   Abdominal:      General: There is no distension.      Palpations: Abdomen is soft.      Tenderness: There is no abdominal tenderness. There is no guarding.   Musculoskeletal:         General: No swelling.   Skin:     General: Skin is warm.   Neurological:      Mental Status: He is alert and oriented to person, place, and time.   Psychiatric:      Comments: Confused, agitated at times            Vents:     Lines/Drains/Airways       Drain  Duration             Male External Urinary Catheter 11/14/24 1800 1 day              Peripheral Intravenous Line  Duration                  Peripheral IV - Single Lumen 11/14/24 1650 20 G Anterior;Right Forearm 1 day         Peripheral IV - Single Lumen 11/14/24 1746 20 G Right Hand 1 day                  Significant Labs:    CBC/Anemia Profile:  Recent Labs   Lab 11/14/24  1152 11/15/24  0353 11/16/24  0503   WBC 9.95 5.42 6.17   HGB 15.1 14.3 13.4*   HCT 43.0 40.1 38.6*    100* 95*   MCV 91 92 94   RDW 13.1 13.3 13.5        Chemistries:  Recent Labs   Lab 11/14/24  1152 11/14/24  1429 11/15/24  0353 11/15/24  1214 11/15/24  1853 11/16/24  0009 11/16/24  0503   *   < > 125*   < > 126* 129* 127*   K 4.3   < > 3.1*   < > 4.0 3.3* 4.6   CL 90*   < > 97   < > 100 105 100   CO2 20*   < > 18*   < > 18* 17* 21*   BUN 6*   < > 8   < > 8 7* 9   CREATININE 1.6*   < > 1.1   < > 1.0 0.8 1.0   CALCIUM 8.7   < > 8.5*   < > 8.2* 7.0* 8.9   ALBUMIN 4.0  --  3.3*  --   --   --  3.5   PROT 7.0  --  5.9*  --   --   --  6.2   BILITOT 1.6*  --  1.3*  --   --   --  1.2*   ALKPHOS 74  --  64  --   --   --  63   ALT 27  --  21  --   --   --  22   AST 67*  --  47*  --   --   --  38   MG 1.3*  --  2.0  --   --   --  1.5*   PHOS  --   --  3.6  --   --   --  2.8    < > = values in this interval not displayed.     All pertinent labs within the past 24 hours have been reviewed.    Significant  Imaging:  I have reviewed all pertinent imaging results/findings within the past 24 hours.

## 2024-11-16 NOTE — ASSESSMENT & PLAN NOTE
non aniongap  . patient with bicarbonate   Recent Labs   Lab 11/16/24  0009 11/16/24  0503 11/16/24  1118   CO2 17* 21* 14*    .started on bicarbonate drip x 5h. monitor

## 2024-11-16 NOTE — ASSESSMENT & PLAN NOTE
Na 122 on admission. Serum osm 264, Urine osm 409, Urine Na 30. Not fully explained by alcohol use / beer potomania, suspect component of SIADH as well.    Na improving but remains hyponatremic. Will check RUQ US to evaluate for cirrhosis given alcohol use history.    --Daily CMP  -- Hold SSRI  -- Follow-up RUQ US

## 2024-11-16 NOTE — ASSESSMENT & PLAN NOTE
"   presented to ED with c/o shortness of breath and "heart racing". Has been trying to cut back on alcohol use. Now in alcohol withdrawals.      11/15: Patient required 14 mg PRN ativan since admission in addition to scheduled diazepam. Plan to increase scheduled diazepam, continue titrating PRN ativan and precedex gtt.  11/16: Precedex weaned off, required 1 dose of PRN ativan overnight.     -- Diazepam taper: 10 mg TID -> 10 mg BID -> 5 mg BID -> 5 mg daily -> stop  -- Continue PRN ativan 2 mg for CIWA > 8  -- Stop precedex gtt  --Daily thiamine, folic acid, multivitamin  --CIWA protocol q4hrs     "

## 2024-11-16 NOTE — ASSESSMENT & PLAN NOTE
11/16 daughter reports 6 falls recently.  x ray shoulder right 8/24 -anterior inferior shoulder dislocation with impaction in Hill-Sachs deformity on the humeral head. No definite fracture.  Xray right foot 9/14 -Mildly displaced distal fibular fracture -     PT/OT eval -likely needs SNF

## 2024-11-16 NOTE — ASSESSMENT & PLAN NOTE
The likely etiology of thrombocytopenia is alcohol abuse. monitor   Recent Labs     11/14/24  1152 11/15/24  0353 11/16/24  0503    100* 95*

## 2024-11-16 NOTE — ASSESSMENT & PLAN NOTE
Results for orders placed during the hospital encounter of 11/14/24  Echo  Interpretation Summary    Left Ventricle: The left ventricle is normal in size. Normal wall thickness. There is concentric remodeling. There is low normal systolic function with a visually estimated ejection fraction of 50 - 55%. There is normal diastolic function.    Right Ventricle: Normal right ventricular cavity size. Wall thickness is normal. Systolic function is normal.    Mild biatrial enlargement    Aortic Valve: The aortic valve is a trileaflet valve. There is mild aortic valve sclerosis.    Pulmonary Artery: The estimated pulmonary artery systolic pressure is 25 mmHg.    IVC/SVC: Normal venous pressure at 3 mmHg.    -- No evidence of acute exacerbation

## 2024-11-16 NOTE — HOSPITAL COURSE
The patient was admitted to hospital medicine for further management of his acute alcohol withdrawals. He was started on scheduled and as needed benzodiazepines as well as a precedex gtt. His hyponatremia was trended and treated with IV fluids and holding his home SSRI. His withdrawal symptoms improved and precedex was weaned off. He was continued on a valium taper with PRN ativan as needed.     No acute overnight events. Patient only required 1 dose of PRN ativan 2 mg for CIWA > 8. Precedex gtt weaning.    11/17 Transfer to hospital medicine  [facility] of A-fib, gout, anxiety, h/o RCC s/p partial nephrectomy (2014), HTN, HLD, subdural hematoma in September 2024 presenting with acute alcohol withdrawals. Initially required a precedex gtt on top of scheduled and as needed benzos, now doing well on scheduled benzo taper with additional PRN ativan driven by CIWA. Continue diazepam taper with  PRN ativan for breakthrough CIWA scoring. RUQ US ordered. no historical cirrhosis but his mild hyponatremia is concerning for cirrhosis w/ his alcohol history. given bicarb drip for nonanion gap metabolic acidosis. Addiction psychiatry consulted. recurrent falls. PT/OT consulted. EP follow up to discuss frequent falls while on Eliquis.  not had any ischemic workup since his EF became mildly reduced sometime in 2021. recent neuropsychiatry eval - needs help with most IADLs, indicating a diagnosis of Major Neurocognitive Disorder/dementia, mild stage likely with severe alcohol use . encourage alcohol cessation. CT head 11/14 -No evidence of acute intracranial hemorrhage as above.  orthopedic f/u 9/17 -acute nondisplaced long spiral fracture of the lateral malleolus at the level of the syndesmosis. Nondisplaced avulsion fracture involving the tip of the medial malleolus. Ankle mortise remains congruent on weight-bearing stress x-rays. orthopedics consulted for weight bearing status . orthostatic hypotension +. continue gentle LR  hydration .sodium improved to 135. K and Mg replaced   11/18 agitated overnight - combative with nursing staff.  given ativan/haldol and wrist restraints but later was able to get out of restraints and out of the room. psychiatry follow up  . AAOX 3 this AM  11/19 Mg replaced.   11/20 completed valium taper. Daughter plans to take patient to alcohol rehab  at [facility] in [place].  PT recs HH

## 2024-11-16 NOTE — RESIDENT HANDOFF
Handoff     Primary Team: Networked reference to record [facility]  Room Number: 7076/7076 A     Patient Name: Donald Warren Abadie MRN: [id number]     Date of Birth: [id number] Allergies: No known drug allergies     Age: 69 y.o. Admit Date: 11/14/2024     Sex: male  BMI: Body mass index is 33.15 kg/m².     Code Status: Full Code        Illness Level (current clinical status): Watcher - No    Reason for Admission: Alcohol withdrawal syndrome with complication    Brief HPI: 69-year-old male with a past medical history of Afib, gout, anxiety, left sided RCC s/p partial nephrectomy (2014), HTN, HLD, subdural hematoma (9/20/2024), and alcoholism who presents to the ED with concern of alcohol withdrawal. He has been trying to cut down on his drinking after a recent fall with a head injury and subdural but typically drinks about 5-6 beers daily. His daughter came to his house because he was short of breath and was complaining that his heart was racing. She is a nurse and found him in Afib with elevated rate. He fell the night prior to admission as well. She is worried about him being able to detox at home. He states that he is feeling short of breath and anxious. He had minimal confusion at the time of arrival to the ED.     In the ED, , Na 122, Cl 90, CO2 20, glucose 130, BUN 6, Creatinine 1.6, T.Bili 1.6, AST 67, Magnesium 1.3. CT head with no evidence of acute intracranial hemorrhage. At the time of MICU evaluation the patient is tachycardic, tachypneic, and diaphoretic despite 4 mg IV ativan and 5 mg PO diazepam. He exhibits hallucinations and nonsensical speech upon evaluation.    Procedure Date: N/A    Hospital Course: The patient was admitted to hospital medicine for further management of his acute alcohol withdrawals. He was started on scheduled and as needed benzodiazepines as well as a precedex gtt. His hyponatremia was trended and treated with IV fluids and holding his home SSRI. His withdrawal symptoms improved and  precedex was weaned off. He was continued on a valium taper with PRN ativan as needed.     Tasks: Continue diazepam taper w/ PRN ativan for breakthrough CIWA scoring. Follow-up RUQ US (no historical cirrhosis but his mild hyponatremia is concerning for cirrhosis w/ his alcohol history).    Contingency Plan: IF delirium tremens, re-consult MICU    Estimated Discharge Date: 2-3 days    Discharge Disposition: Home or Self Care    Mentored By: Dr. Spring

## 2024-11-16 NOTE — PLAN OF CARE
MICU DAILY GOALS     Family/Goals of care/Code Status   Code Status: Full Code    24H Vital Sign Range  Temp:  [97.1 °F (36.2 °C)-99.4 °F (37.4 °C)]   Pulse:  []   Resp:  [17-34]   BP: ()/(53-88)   SpO2:  [96 %-100 %]      Shift Events (include procedures and significant events)   No acute events throughout shift    AWAKE RASS: Goal - RASS Goal: 0-->alert and calm  Actual - RASS ([place] Agitation-Sedation Scale): alert and calm    Restraint necessity: Not necessary   BREATHE SBT: Not intubated    Coordinate A & B, analgesics/sedatives Pain: managed   SAT: Not intubated   Delirium CAM-ICU: Overall CAM-ICU: Negative   Early(intubated/ Progressive (non-intubated) Mobility MOVE Screen (INTUBATED ONLY): Not intubated    Activity: Activity Management: Arm raise - L1, Rolling - L1   Feeding/Nutrition Diet order: Diet/Nutrition Received: NPO,     Thrombus DVT prophylaxis: VTE Core Measure: Pharmacological prophylaxis initiated/maintained   HOB Elevation Head of Bed (HOB) Positioning: HOB at 30 degrees   Ulcer Prophylaxis GI: yes   Glucose control managed Glycemic Management: blood glucose monitored   Skin Skin assessment:     Sacrum intact/not altered? Yes  Heels intact/not altered? Yes  Surgical wound? No    CHECK ONE!   (no altered skin or altered skin) and sub boxes:  [x] No Altered Skin Integrity Present    [x]Prevention Measures Documented    [] Altered Skin Integrity Present or Discovered   [] LDA present in EPIC, daily doc completed              [] LDA added if not in EPIC (describe wound).                    When describing wound, do not stage, use descriptive words only.    [] Wound Image Taken (required on admit,                   transfer/discharge and every Tuesday)    Wound Care Consulted? No   Bowel Function diarrhea    Indwelling Catheter Necessity            De-escalation Antibiotics No        VS and assessment per flow sheet, patient progressing towards goals as tolerated, plan of care  reviewed with  patient , all concerns addressed, will continue to monitor.

## 2024-11-16 NOTE — ASSESSMENT & PLAN NOTE
Baseline Cr- 1.0. Cr- 1.6 BUN-6 on arrival. Collapsible IVC seen on POCUS.    Improved with IV fluids.    Lab Results   Component Value Date    CREATININE 1.0 11/16/2024     --Qd CMP  -- Avoid nephrotoxic medications as able

## 2024-11-16 NOTE — PROGRESS NOTES
[facility] - Telemetry [facility] Medicine  Progress Note    Patient Name: Donald Warren Abadie  MRN: [id number]  Patient Class: IP- Inpatient   Admission Date: 11/14/2024  Length of Stay: 2 days  Attending Physician: Mehdi Ramey MD  Primary Care Provider: Bacilio Larry MD        Subjective:     Principal Problem:Alcohol withdrawal syndrome with complication        HPI:  Donald Abadie is a 69-year-old male with a past medical history of Afib, gout, anxiety, left sided RCC s/p partial nephrectomy (2014), HTN, HLD, subdural hematoma (9/20/2024), and alcoholism who presents to the ED with concern of alcohol withdrawal. He has been trying to cut down on his drinking after a recent fall with a head injury and subdural but typically drinks about 5-6 beers daily. His daughter came to his house because he was short of breath and was complaining that his heart was racing. She is a nurse and found him in Afib with elevated rate. He fell the night prior to admission as well. She is worried about him being able to detox at home. He states that he is feeling short of breath and anxious. He had minimal confusion at the time of arrival to the ED.     In the ED, , Na 122, Cl 90, CO2 20, glucose 130, BUN 6, Creatinine 1.6, T.Bili 1.6, AST 67, Magnesium 1.3. CT head with no evidence of acute intracranial hemorrhage. At the time of MICU evaluation the patient is tachycardic, tachypneic, and diaphoretic despite 4 mg IV ativan and 5 mg PO diazepam. He exhibits hallucinations and nonsensical speech upon evaluation.         Overview/Hospital Course:    The patient was admitted to hospital medicine for further management of his acute alcohol withdrawals. He was started on scheduled and as needed benzodiazepines as well as a precedex gtt. His hyponatremia was trended and treated with IV fluids and holding his home SSRI. His withdrawal symptoms improved and precedex was weaned off. He was continued on a valium taper with  PRN ativan as needed.     No acute overnight events. Patient only required 1 dose of PRN ativan 2 mg for CIWA > 8. Precedex gtt weaning.    11/16 Transfer to hospital medicine  [facility], gout, anxiety, h/o RCC s/p partial nephrectomy (2014), HTN, HLD, subdural hematoma in September 2024 presenting with acute alcohol withdrawals. Initially required a precedex gtt on top of scheduled and as needed benzos, now doing well on scheduled benzo taper with additional PRN ativan driven by CIWA. Continue diazepam taper with  PRN ativan for breakthrough CIWA scoring. RUQ US ordered. no historical cirrhosis but his mild hyponatremia is concerning for cirrhosis w/ his alcohol history.        Review of Systems:   Pain scale:   Constitutional:  fever,  chills, headache, vision loss, hearing loss, weight loss, Generalized weakness, falls, loss of smell, loss of taste, poor appetite,  sore throat  Respiratory: cough, shortness of breath.   Cardiovascular: chest pain, dizziness, palpitations, orthopnea, swelling of feet, syncope  Gastrointestinal: nausea, vomiting, abdominal pain, diarrhea, black stool,  blood in stool, change in bowel habits, constipation  Genitourinary: hematuria, dysuria, urgency, frequency  Integument/Breast: rash,  pruritis  Hematologic/Lymphatic: easy bruising, lymphadenopathy  Musculoskeletal: arthralgias , myalgias, back pain, neck pain, knee pain  Neurological: confusion, seizures, tremors, slurred speech  Behavioral/Psych:  depression, anxiety, auditory or visual hallucinations     OBJECTIVE:     Physical Exam:  Body mass index is 33.15 kg/m².    Constitutional: Appears well-developed and well-nourished. obesity. hard of hearing  Head: Normocephalic and atraumatic.   Neck: Normal range of motion. Neck supple.   Cardiovascular: Normal heart rate.  irregular heart rhythm.  Pulmonary/Chest: Effort normal.   Abdominal: No distension.  No tenderness  Musculoskeletal: Normal range of motion. No edema.  "  Neurological: Alert and oriented to person, place, and time.   Skin: Skin is warm and dry.   Psychiatric: Normal mood and affect. Behavior is normal.                  Vital Signs  Temp: 98 °F (36.7 °C) (11/16/24 1638)  Pulse: (!) 51 (11/16/24 1644)  Resp: 18 (11/16/24 1638)  BP: (!) 142/79 (11/16/24 1638)  SpO2: 100 % (11/16/24 1638)     24 Hour VS Range    Temp:  [96.6 °F (35.9 °C)-99.4 °F (37.4 °C)]   Pulse:  [50-87]   Resp:  [14-31]   BP: ()/(41-84)   SpO2:  [97 %-100 %]     Intake/Output Summary (Last 24 hours) at 11/16/2024 1658  Last data filed at 11/16/2024 1636  Gross per 24 hour   Intake 955.4 ml   Output 2290 ml   Net -1334.6 ml         I/O This Shift:  I/O this shift:  In: 498.1 [P.O.:240; I.V.:63.4; IV Piggyback:194.7]  Out: 815 [Urine:815]    Wt Readings from Last 3 Encounters:   11/15/24 77 kg (169 lb 12.1 oz)   11/07/24 77.5 kg (170 lb 13.7 oz)   10/03/24 78.4 kg (172 lb 13.5 oz)       I have personally reviewed the vitals and recorded Intake/Output     Laboratory/Diagnostic Data:    CBC/Anemia Labs: Coags:    Recent Labs   Lab 11/14/24  1152 11/15/24  0353 11/16/24  0503   WBC 9.95 5.42 6.17   HGB 15.1 14.3 13.4*   HCT 43.0 40.1 38.6*    100* 95*   MCV 91 92 94   RDW 13.1 13.3 13.5    No results for input(s): "PT", "INR", "APTT" in the last 168 hours.     Chemistries: ABG:   Recent Labs   Lab 11/14/24  1152 11/14/24  1429 11/15/24  0353 11/15/24  1214 11/16/24  0009 11/16/24  0503 11/16/24  1118   *   < > 125*   < > 129* 127* 129*   K 4.3   < > 3.1*   < > 3.3* 4.6 4.8   CL 90*   < > 97   < > 105 100 101   CO2 20*   < > 18*   < > 17* 21* 14*   BUN 6*   < > 8   < > 7* 9 9   CREATININE 1.6*   < > 1.1   < > 0.8 1.0 1.1   CALCIUM 8.7   < > 8.5*   < > 7.0* 8.9 8.8   PROT 7.0  --  5.9*  --   --  6.2  --    BILITOT 1.6*  --  1.3*  --   --  1.2*  --    ALKPHOS 74  --  64  --   --  63  --    ALT 27  --  21  --   --  22  --    AST 67*  --  47*  --   --  38  --    MG 1.3*  --  2.0  --   --  " "1.5*  --    PHOS  --   --  3.6  --   --  2.8  --     < > = values in this interval not displayed.    No results for input(s): "PH", "PCO2", "PO2", "HCO3", "POCSATURATED", "BE" in the last 168 hours.     POCT Glucose: HbA1c:    Recent Labs   Lab 11/14/24 2053 11/15/24  0744 11/15/24  1213 11/15/24  1620 11/15/24  2128 11/16/24  0731   POCTGLUCOSE 157* 126* 123* 165* 98 103    Hemoglobin A1C   Date Value Ref Range Status   11/15/2024 5.3 4.0 - 5.6 % Final     Comment:     ADA Screening Guidelines:  5.7-6.4%  Consistent with prediabetes  >or=6.5%  Consistent with diabetes    High levels of fetal hemoglobin interfere with the HbA1C  assay. Heterozygous hemoglobin variants (HbS, HgC, etc)do  not significantly interfere with this assay.   However, presence of multiple variants may affect accuracy.     02/29/2024 5.9 (H) 4.0 - 5.6 % Final     Comment:     ADA Screening Guidelines:  5.7-6.4%  Consistent with prediabetes  >or=6.5%  Consistent with diabetes    High levels of fetal hemoglobin interfere with the HbA1C  assay. Heterozygous hemoglobin variants (HbS, HgC, etc)do  not significantly interfere with this assay.   However, presence of multiple variants may affect accuracy.     10/29/2023 8.0 (H) 4.0 - 5.6 % Final     Comment:     ADA Screening Guidelines:  5.7-6.4%  Consistent with prediabetes  >or=6.5%  Consistent with diabetes    High levels of fetal hemoglobin interfere with the HbA1C  assay. Heterozygous hemoglobin variants (HbS, HgC, etc)do  not significantly interfere with this assay.   However, presence of multiple variants may affect accuracy.          Cardiac Enzymes: Ejection Fractions:    Recent Labs     11/14/24  1152 11/14/24  1429 11/15/24  0352   CPK  --   --  124   TROPONINI 0.006 0.006  --     EF   Date Value Ref Range Status   11/30/2022 45 % Final   11/01/2021 45 % Final          No results for input(s): "COLORU", "APPEARANCEUA", "PHUR", "SPECGRAV", "PROTEINUA", "GLUCUA", "KETONESU", "BILIRUBINUA", " ""OCCULTUA", "NITRITE", "UROBILINOGEN", "LEUKOCYTESUR", "RBCUA", "WBCUA", "BACTERIA", "SQUAMEPITHEL", "HYALINECASTS" in the last 48 hours.    Invalid input(s): "WRIGHTSUR"    Lactate (Lactic Acid) (mmol/L)   Date Value   08/04/2024 1.2   11/29/2022 2.5 (H)   11/29/2022 5.7 (HH)   03/17/2019 0.9   03/17/2019 0.7     BNP (pg/mL)   Date Value   11/14/2024 371 (H)   04/22/2024 516 (H)   04/02/2024 529 (H)   10/28/2023 226 (H)   11/29/2022 324 (H)     Sed Rate (mm/hr)   Date Value   10/04/2006 5   12/21/2005 28 (H)     D-Dimer (mg/L FEU)   Date Value   10/31/2021 0.46   09/25/2018 0.42     Ferritin   Date Value   02/16/2017 858 ng/mL (H)   11/16/2016 1,344 ng/mL (H)   06/28/2004 706.7 ng/ml (H)     No results found for: "LDH"  Troponin I (ng/mL)   Date Value   11/14/2024 0.006   11/14/2024 0.006   09/12/2024 <0.012   09/12/2024 <0.012   08/04/2024 <0.006   04/22/2024 <0.006   04/02/2024 <0.006   10/28/2023 <0.006     CPK (U/L)   Date Value   11/15/2024 124   06/21/2023 72   03/15/2019 189     CK (U/L)   Date Value   06/13/2024 64     No results found. However, due to the size of the patient record, not all encounters were searched. Please check Results Review for a complete set of results.  SARS-CoV2 (COVID-19) Qualitative PCR (no units)   Date Value   10/29/2023 Not Detected   11/29/2022 Not Detected     POC Rapid COVID (no units)   Date Value   10/31/2021 Negative       Microbiology labs for the last week  Microbiology Results (last 7 days)       ** No results found for the last 168 hours. **            Reviewed and noted in plan where applicable- Please see chart for full lab data.    Lines/Drains:       Peripheral IV - Single Lumen 11/14/24 1650 20 G Anterior;Right Forearm (Active)   Site Assessment Clean;Dry;Intact 11/16/24 1635   Line Securement Device Secured with sutureless device 11/14/24 1930   Extremity Assessment Distal to IV No abnormal discoloration;No redness;No swelling 11/16/24 1635   Line Status Infusing " 11/16/24 1635   Dressing Status Clean;Dry;Intact 11/16/24 1635   Dressing Intervention Integrity maintained 11/16/24 1635   Dressing Change Due 11/18/24 11/16/24 1130   Site Change Due 11/18/24 11/16/24 1130   Reason Not Rotated Not due 11/16/24 1130   Number of days: 2            Peripheral IV - Single Lumen 11/14/24 1746 20 G Right Hand (Active)   Site Assessment Clean;Dry;Intact 11/16/24 1637   Line Securement Device Secured with sutureless device 11/14/24 1930   Extremity Assessment Distal to IV No abnormal discoloration;No redness;No swelling 11/16/24 1637   Line Status Saline locked 11/16/24 1130   Dressing Status Clean;Dry;Intact 11/16/24 1637   Dressing Intervention Integrity maintained 11/16/24 1637   Dressing Change Due 11/18/24 11/16/24 1130   Site Change Due 11/18/24 11/16/24 1130   Reason Not Rotated Not due 11/16/24 1130   Number of days: 1       Male External Urinary Catheter 11/14/24 1800 (Active)   Collection Container Urimeter 11/16/24 1636   Securement Method other (see comments) 11/16/24 1636   Skin no redness;no breakdown 11/16/24 1636   Tolerance no signs/symptoms of discomfort 11/16/24 1636   Output (mL) 0 mL 11/16/24 1636   Catheter Change Date 11/16/24 11/16/24 1636   Catheter Change Time 1636 11/16/24 1636   Number of days: 1       Imaging  ECG Results              EKG 12-lead (Final result)        Collection Time Result Time QRS Duration OHS QTC Calculation    11/14/24 11:55:34 11/14/24 14:10:17 82 430                     Final result by Interface, Lab In [facility] (11/14/24 14:10:20)                   Narrative:    Test Reason :    Vent. Rate : 100 BPM     Atrial Rate : 107 BPM     P-R Int :    ms          QRS Dur :  82 ms      QT Int : 334 ms       P-R-T Axes :     -4  -6 degrees    QTcB Int : 430 ms    Baseline Artifact  Atrial fibrillation  Abnormal ECG  When compared with ECG of 14-Nov-2024 11:54,  No significant change was found  Confirmed by Quique Hernandez ([id number]) on 11/14/2024 2:10:16  PM    Referred By: AAAREFERRAL SELF           Confirmed By: Quique Hernandez                                     EKG 12-lead (Final result)        Collection Time Result Time QRS Duration OHS QTC Calculation    11/14/24 11:54:01 11/14/24 14:10:03 98 499                     Final result by Interface, Lab In [facility] (11/14/24 14:10:12)                   Narrative:    Test Reason : R07.9,    Vent. Rate : 106 BPM     Atrial Rate : 258 BPM     P-R Int :    ms          QRS Dur :  98 ms      QT Int : 376 ms       P-R-T Axes :     -4   6 degrees    QTcB Int : 499 ms    Baseline Artifact  Atrial fibrillation with rapid ventricular response  Abnormal ECG  When compared with ECG of 14-Nov-2024 11:14,  No significant change was found  Confirmed by Quique Hernandez ([id number]) on 11/14/2024 2:09:58 PM    Referred By: AAAREFERRAL SELF           Confirmed By: Quique Hernandez                                     EKG 12-lead (Final result)        Collection Time Result Time QRS Duration OHS QTC Calculation    11/14/24 11:14:35 11/14/24 14:09:47 86 474                     Final result by Interface, Lab In [facility] (11/14/24 14:09:51)                   Narrative:    Test Reason :    Vent. Rate : 102 BPM     Atrial Rate : 100 BPM     P-R Int :    ms          QRS Dur :  86 ms      QT Int : 364 ms       P-R-T Axes :      1   0 degrees    QTcB Int : 474 ms    Baseline Artifact  Atrial fibrillation with rapid ventricular response  Abnormal ECG  When compared with ECG of 14-Nov-2024 10:06,  No significant change was found  Confirmed by Quique Hernandez ([id number]) on 11/14/2024 2:09:44 PM    Referred By: AAAREFERRAL SELF           Confirmed By: Quique Hernandez                                     EKG 12-lead (Final result)        Collection Time Result Time QRS Duration OHS QTC Calculation    11/14/24 10:06:07 11/14/24 14:09:30 82 481                     Final result by Interface, Lab In [facility] (11/14/24 14:09:38)                   Narrative:    Test Reason :  R06.02,    Vent. Rate : 111 BPM     Atrial Rate :    BPM     P-R Int :    ms          QRS Dur :  82 ms      QT Int : 354 ms       P-R-T Axes :      0  -3 degrees    QTcB Int : 481 ms    Atrial fibrillation with rapid ventricular response  Abnormal ECG  No previous ECGs available  Confirmed by Quique Hernandez ([id number]) on 11/14/2024 2:09:28 PM    Referred By: AAAREFERRAL SELF           Confirmed By: Quique Hernandez                                    Results for orders placed during the hospital encounter of 11/14/24    Echo    Interpretation Summary    Left Ventricle: The left ventricle is normal in size. Normal wall thickness. There is concentric remodeling. There is low normal systolic function with a visually estimated ejection fraction of 50 - 55%. There is normal diastolic function.    Right Ventricle: Normal right ventricular cavity size. Wall thickness is normal. Systolic function is normal.    Mild biatrial enlargement    Aortic Valve: The aortic valve is a trileaflet valve. There is mild aortic valve sclerosis.    Pulmonary Artery: The estimated pulmonary artery systolic pressure is 25 mmHg.    IVC/SVC: Normal venous pressure at 3 mmHg.      Echo    Left Ventricle: The left ventricle is normal in size. Normal wall   thickness. There is concentric remodeling. There is low normal systolic   function with a visually estimated ejection fraction of 50 - 55%. There is   normal diastolic function.    Right Ventricle: Normal right ventricular cavity size. Wall thickness   is normal. Systolic function is normal.    Mild biatrial enlargement    Aortic Valve: The aortic valve is a trileaflet valve. There is mild   aortic valve sclerosis.    Pulmonary Artery: The estimated pulmonary artery systolic pressure is   25 mmHg.    IVC/SVC: Normal venous pressure at 3 mmHg.      Labs, Imaging, EKG and Diagnostic results from 11/16/2024 were reviewed.    Medications:  Medication list was reviewed and changes noted under  Assessment/Plan.  No current facility-administered medications on file prior to encounter.     Current Outpatient Medications on File Prior to Encounter   Medication Sig Dispense Refill    allopurinoL (ZYLOPRIM) 100 MG tablet TAKE 2 TABLETS(200 MG) BY MOUTH EVERY  tablet 0    cyanocobalamin (VITAMIN B-12) 1000 MCG tablet Take 1 tablet (1,000 mcg total) by mouth once daily.      diclofenac sodium (VOLTAREN) 1 % Gel Apply 2 g topically 3 (three) times daily as needed (Right knee - hand pain).      dronedarone (MULTAQ) 400 mg Tab Take 1 tablet (400 mg total) by mouth 2 (two) times daily with meals. (Patient taking differently: Take 400 mg by mouth once daily.) 60 tablet 11    ELIQUIS 5 mg Tab TAKE 1 TABLET BY MOUTH TWICE DAILY (Patient taking differently: Take 5 mg by mouth 2 (two) times daily.) 180 tablet 3    folic acid (FOLVITE) 1 MG tablet Take 1 tablet (1 mg total) by mouth once daily. 30 tablet 11    pantoprazole (PROTONIX) 40 MG tablet Take 1 tablet (40 mg total) by mouth once daily. 90 tablet 3    rOPINIRole (REQUIP) 1 MG tablet TAKE 1 TABLET(1 MG) BY MOUTH EVERY EVENING (Patient taking differently: Take 1 tablet by mouth daily as needed (restless leg).) 90 tablet 3    rosuvastatin (CRESTOR) 20 MG tablet TAKE 1 TABLET(20 MG) BY MOUTH EVERY DAY 90 tablet 3    sertraline (ZOLOFT) 100 MG tablet Take 1 tablet (100 mg total) by mouth once daily. 90 tablet 3    tamsulosin (FLOMAX) 0.4 mg Cap TAKE 1 CAPSULE(0.4 MG) BY MOUTH EVERY DAY (Patient taking differently: Take 0.4 mg by mouth every evening.) 90 capsule 3    VITAMIN B-1 100 MG tablet TAKE 1 TABLET BY MOUTH ONCE DAILY 30 tablet 2     Scheduled Medications:  Current Facility-Administered Medications   Medication Dose Route Frequency    apixaban  5 mg Oral BID    diazePAM  20 mg Oral Q8H    Followed by    [START ON 11/17/2024] diazePAM  10 mg Oral Q8H    Followed by    [START ON 11/18/2024] diazePAM  10 mg Oral Q12H    Followed by    [START ON 11/19/2024]  "diazePAM  5 mg Oral Q12H    Followed by    [START ON 11/20/2024] diazePAM  5 mg Oral Daily    dronedarone  400 mg Oral Daily    multivitamin  1 tablet Oral Daily    mupirocin   Nasal BID    pantoprazole  40 mg Oral Daily    thiamine (B-1) 500 mg in D5W 100 mL IVPB  500 mg Intravenous TID    Followed by    [START ON 11/17/2024] thiamine (B-1) 250 mg in D5W 100 mL IVPB  250 mg Intravenous Daily    Followed by    [START ON 11/20/2024] thiamine  100 mg Oral Daily     PRN:   Current Facility-Administered Medications:     dextrose 10%, 12.5 g, Intravenous, PRN    dextrose 10%, 25 g, Intravenous, PRN    diclofenac sodium, 2 g, Topical (Top), TID PRN    glucagon (human recombinant), 1 mg, Intramuscular, PRN    glucose, 16 g, Oral, PRN    glucose, 24 g, Oral, PRN    insulin aspart U-100, 0-5 Units, Subcutaneous, QID (AC + HS) PRN    lorazepam, 2 mg, Intravenous, Q1H PRN    ondansetron, 4 mg, Intravenous, Q8H PRN    sodium chloride 0.9%, 10 mL, Intravenous, PRN  Infusions:    sodium bicarbonate 150 mEq in D5W 1,000 mL infusion   Intravenous Continuous 100 mL/hr at 11/16/24 1632 New Bag at 11/16/24 1632     Estimated Creatinine Clearance: 53.7 mL/min (based on SCr of 1.1 mg/dL).             Assessment/Plan:      * Alcohol withdrawal syndrome with complication     presented to ED with c/o shortness of breath and "heart racing". Has been trying to cut back on alcohol use. Now in alcohol withdrawals.      11/15: Patient required 14 mg PRN ativan since admission in addition to scheduled diazepam. Plan to increase scheduled diazepam, continue titrating PRN ativan and precedex gtt.  11/16: Precedex weaned off, required 1 dose of PRN ativan overnight.     -- Diazepam taper: 10 mg TID -> 10 mg BID -> 5 mg BID -> 5 mg daily -> stop  -- Continue PRN ativan 2 mg for CIWA > 8  -- Stop precedex gtt  --Daily thiamine, folic acid, multivitamin  --CIWA protocol q4hrs       Orthostatic hypotension    11/16 SBP 70/80s on sitting up in the " chair, improved to 99/55 on lying down.  IV hydration     Falls frequently  11/16 daughter reports 6 falls recently.  x ray shoulder right 8/24 -anterior inferior shoulder dislocation with impaction in Hill-Sachs deformity on the humeral head. No definite fracture.  Xray right foot 9/14 -Mildly displaced distal fibular fracture -     PT/OT eval -likely needs SNF    Thrombocytopenia  The likely etiology of thrombocytopenia is alcohol abuse. monitor   Recent Labs     11/14/24  1152 11/15/24  0353 11/16/24  0503    100* 95*       Heart failure with mildly reduced ejection fraction (HFmrEF)    Results for orders placed during the hospital encounter of 11/14/24  Echo  Interpretation Summary    Left Ventricle: The left ventricle is normal in size. Normal wall thickness. There is concentric remodeling. There is low normal systolic function with a visually estimated ejection fraction of 50 - 55%. There is normal diastolic function.    Right Ventricle: Normal right ventricular cavity size. Wall thickness is normal. Systolic function is normal.    Mild biatrial enlargement    Aortic Valve: The aortic valve is a trileaflet valve. There is mild aortic valve sclerosis.    Pulmonary Artery: The estimated pulmonary artery systolic pressure is 25 mmHg.    IVC/SVC: Normal venous pressure at 3 mmHg.     -- No evidence of acute exacerbation    Primary hypertension  , controlled.  Latest blood pressure and vitals reviewed-   Temp:  [96.6 °F (35.9 °C)-99.4 °F (37.4 °C)]   Pulse:  [50-87]   Resp:  [14-31]   BP: ()/(41-88)   SpO2:  [97 %-100 %] .   not on medications    PRN meds if BP> 180/110 mm HG     Hypomagnesemia  replaced   Recent Labs   Lab 11/16/24  0503   MG 1.5*        Metabolic acidosis  non aniongap  . patient with bicarbonate   Recent Labs   Lab 11/16/24  0009 11/16/24  0503 11/16/24  1118   CO2 17* 21* 14*    .started on bicarbonate drip x 5h. monitor        Type 2 diabetes mellitus without complication, without  long-term current use of insulin  Patient's FSGs are controlled on current hypoglycemics.   Last A1c reviewed-   Lab Results   Component Value Date    HGBA1C 5.3 11/15/2024    HGBA1C 5.9 (H) 02/29/2024    HGBA1C 8.0 (H) 10/29/2023     Will hold PO hypoglycemics and will start correctional scale insulin  Most recent fingerstick glucose reviewed-   Recent Labs   Lab 11/15/24  1620 11/15/24  2128 11/16/24  0731   POCTGLUCOSE 165* 98 103     currently on   Antihyperglycemics (From admission, onward)      Start     Stop Route Frequency Ordered    11/14/24 1554  insulin aspart U-100 pen 0-5 Units         -- SubQ Before meals & nightly PRN 11/14/24 1455             Hyponatremia    Recent Labs     11/16/24  0009 11/16/24  0503 11/16/24  1118   * 127* 129*     Na 122 on admission. Serum osm 264, Urine osm 409, Urine Na 30. Not fully explained by alcohol use / beer potomania, suspect component of SIADH as well.     Na improving but remains hyponatremic. Will check RUQ US to evaluate for cirrhosis given alcohol use history.     --Daily CMP  -- Hold SSRI  -- Follow-up RUQ US       Paroxysmal atrial fibrillation    Hx of atrial fibrillation   --Continue home dronedarone  -- Continue home eliquis  -- Telemetry      VTE Risk Mitigation (From admission, onward)           Ordered     apixaban tablet 5 mg  2 times daily         11/14/24 1431     IP VTE HIGH RISK PATIENT  Once         11/14/24 1431     Place sequential compression device  Until discontinued         11/14/24 1431                    Discharge Planning   DALY:      Code Status: Full Code   Is the patient medically ready for discharge?:     Reason for patient still in hospital (select all that apply): Treatment  Discharge Plan A: Home                  Mehdi Ramey MD  Department of Hospital Medicine   [facility] - Telemetry Stepdown

## 2024-11-16 NOTE — PLAN OF CARE
[facility] ICU Acceptance Note    Date of Admit: 11/14/2024  Date of Transfer: 11/16/2024  Bojakes, C/J, L, Onc (IV chemo w/in 1 month), Gyn/Onc, or other special case?: no   ICU team stepping patient down:  MICU  Accepting  team: IVET DICKERSON    Brief History of Present Illness:      Donald Warren Abadie is a 69 y.o. male with a PMH of Afib, gout, anxiety, left sided RCC s/p partial nephrectomy (2014), HTN, HLD, subdural hematoma (9/20/2024), and alcoholism who presents to the ED with concern of alcohol withdrawal. off precedex drip . hyponatremia improved       To Do / Pending Studies / Follow ups:    valium taper/ativan PRN  RUQ US -r/o liver disease     Patient has been accepted by [facility] Team IVET DICKERSON, who will assume care of the patient upon arrival to the floor from the ICU. Please contact ICU team with any concerns prior to arrival. Please contact [facility] at [phone] or [phone] (please do NOT leave a voicemail) when patient arrives to the floor.    Mehdi Ramey MD  Attending Staff Physician  [facility]  pager- [phone] [phone]

## 2024-11-16 NOTE — ASSESSMENT & PLAN NOTE
"Pt presented to ED with c/o shortness of breath and "heart racing". Has been trying to cut back on alcohol use. Now in alcohol withdrawals.     11/15: Patient required 14 mg PRN ativan since admission in addition to scheduled diazepam. Plan to increase scheduled diazepam, continue titrating PRN ativan and precedex gtt.  11/16: Precedex weaned off, required 1 dose of PRN ativan overnight.    -- Diazepam taper: 10 mg TID -> 10 mg BID -> 5 mg BID -> 5 mg daily -> stop  -- Continue PRN ativan 2 mg for CIWA > 8  -- Stop precedex gtt  --Daily thiamine, folic acid, multivitamin  --CIWA protocol q4hrs    "

## 2024-11-16 NOTE — ASSESSMENT & PLAN NOTE
Patient's FSGs are controlled on current hypoglycemics.   Last A1c reviewed-   Lab Results   Component Value Date    HGBA1C 5.3 11/15/2024    HGBA1C 5.9 (H) 02/29/2024    HGBA1C 8.0 (H) 10/29/2023     Will hold PO hypoglycemics and will start correctional scale insulin  Most recent fingerstick glucose reviewed-   Recent Labs   Lab 11/15/24  1620 11/15/24  2128 11/16/24  0731   POCTGLUCOSE 165* 98 103     currently on   Antihyperglycemics (From admission, onward)      Start     Stop Route Frequency Ordered    11/14/24 1554  insulin aspart U-100 pen 0-5 Units         -- SubQ Before meals & nightly PRN 11/14/24 7437

## 2024-11-16 NOTE — ASSESSMENT & PLAN NOTE
Recent Labs     11/16/24  0009 11/16/24  0503 11/16/24  1118   * 127* 129*     Na 122 on admission. Serum osm 264, Urine osm 409, Urine Na 30. Not fully explained by alcohol use / beer potomania, suspect component of SIADH as well.     Na improving but remains hyponatremic. Will check RUQ US to evaluate for cirrhosis given alcohol use history.     --Daily CMP  -- Hold SSRI  -- Follow-up RUQ US

## 2024-11-16 NOTE — ASSESSMENT & PLAN NOTE
, controlled.  Latest blood pressure and vitals reviewed-   Temp:  [96.6 °F (35.9 °C)-99.4 °F (37.4 °C)]   Pulse:  [50-87]   Resp:  [14-31]   BP: ()/(41-88)   SpO2:  [97 %-100 %] .   not on medications    PRN meds if BP> 180/110 mm HG

## 2024-11-17 PROBLEM — S82.891D CLOSED FRACTURE OF RIGHT ANKLE WITH ROUTINE HEALING: Status: ACTIVE | Noted: 2024-11-17

## 2024-11-17 LAB
ALBUMIN SERPL BCP-MCNC: 3.8 G/DL (ref 3.5–5.2)
ALP SERPL-CCNC: 68 U/L (ref 40–150)
ALT SERPL W/O P-5'-P-CCNC: 21 U/L (ref 10–44)
ANION GAP SERPL CALC-SCNC: 11 MMOL/L (ref 8–16)
AST SERPL-CCNC: 33 U/L (ref 10–40)
BASOPHILS # BLD AUTO: 0.04 K/UL (ref 0–0.2)
BASOPHILS NFR BLD: 0.7 % (ref 0–1.9)
BILIRUB SERPL-MCNC: 1 MG/DL (ref 0.1–1)
BUN SERPL-MCNC: 6 MG/DL (ref 8–23)
CALCIUM SERPL-MCNC: 9.1 MG/DL (ref 8.7–10.5)
CHLORIDE SERPL-SCNC: 97 MMOL/L (ref 95–110)
CO2 SERPL-SCNC: 27 MMOL/L (ref 23–29)
CREAT SERPL-MCNC: 1 MG/DL (ref 0.5–1.4)
DIFFERENTIAL METHOD BLD: ABNORMAL
EOSINOPHIL # BLD AUTO: 0.1 K/UL (ref 0–0.5)
EOSINOPHIL NFR BLD: 1.1 % (ref 0–8)
ERYTHROCYTE [DISTWIDTH] IN BLOOD BY AUTOMATED COUNT: 13.7 % (ref 11.5–14.5)
EST. GFR  (NO RACE VARIABLE): >60 ML/MIN/1.73 M^2
GLUCOSE SERPL-MCNC: 127 MG/DL (ref 70–110)
HCT VFR BLD AUTO: 40.3 % (ref 40–54)
HGB BLD-MCNC: 13.8 G/DL (ref 14–18)
IMM GRANULOCYTES # BLD AUTO: 0.02 K/UL (ref 0–0.04)
IMM GRANULOCYTES NFR BLD AUTO: 0.4 % (ref 0–0.5)
LYMPHOCYTES # BLD AUTO: 1 K/UL (ref 1–4.8)
LYMPHOCYTES NFR BLD: 16.9 % (ref 18–48)
MAGNESIUM SERPL-MCNC: 1.4 MG/DL (ref 1.6–2.6)
MCH RBC QN AUTO: 32.7 PG (ref 27–31)
MCHC RBC AUTO-ENTMCNC: 34.2 G/DL (ref 32–36)
MCV RBC AUTO: 96 FL (ref 82–98)
MONOCYTES # BLD AUTO: 0.5 K/UL (ref 0.3–1)
MONOCYTES NFR BLD: 9.3 % (ref 4–15)
NEUTROPHILS # BLD AUTO: 4 K/UL (ref 1.8–7.7)
NEUTROPHILS NFR BLD: 71.6 % (ref 38–73)
NRBC BLD-RTO: 0 /100 WBC
PLATELET # BLD AUTO: 117 K/UL (ref 150–450)
PMV BLD AUTO: 9.1 FL (ref 9.2–12.9)
POCT GLUCOSE: 115 MG/DL (ref 70–110)
POCT GLUCOSE: 124 MG/DL (ref 70–110)
POCT GLUCOSE: 154 MG/DL (ref 70–110)
POCT GLUCOSE: 52 MG/DL (ref 70–110)
POTASSIUM SERPL-SCNC: 3.3 MMOL/L (ref 3.5–5.1)
PROT SERPL-MCNC: 6.6 G/DL (ref 6–8.4)
RBC # BLD AUTO: 4.22 M/UL (ref 4.6–6.2)
SODIUM SERPL-SCNC: 135 MMOL/L (ref 136–145)
WBC # BLD AUTO: 5.62 K/UL (ref 3.9–12.7)

## 2024-11-17 PROCEDURE — 83735 ASSAY OF MAGNESIUM: CPT | Mod: HCNC | Performed by: HOSPITALIST

## 2024-11-17 PROCEDURE — 85025 COMPLETE CBC W/AUTO DIFF WBC: CPT | Mod: HCNC | Performed by: HOSPITALIST

## 2024-11-17 PROCEDURE — 63600175 PHARM REV CODE 636 W HCPCS: Mod: HCNC

## 2024-11-17 PROCEDURE — 94761 N-INVAS EAR/PLS OXIMETRY MLT: CPT | Mod: HCNC

## 2024-11-17 PROCEDURE — 20600001 HC STEP DOWN PRIVATE ROOM: Mod: HCNC

## 2024-11-17 PROCEDURE — 25000003 PHARM REV CODE 250: Mod: HCNC

## 2024-11-17 PROCEDURE — 80053 COMPREHEN METABOLIC PANEL: CPT | Mod: HCNC | Performed by: HOSPITALIST

## 2024-11-17 PROCEDURE — 25000003 PHARM REV CODE 250: Mod: HCNC | Performed by: EMERGENCY MEDICINE

## 2024-11-17 PROCEDURE — 25000003 PHARM REV CODE 250: Mod: HCNC | Performed by: HOSPITALIST

## 2024-11-17 PROCEDURE — 63600175 PHARM REV CODE 636 W HCPCS: Mod: HCNC | Performed by: HOSPITALIST

## 2024-11-17 PROCEDURE — 90792 PSYCH DIAG EVAL W/MED SRVCS: CPT | Mod: HCNC,,, | Performed by: STUDENT IN AN ORGANIZED HEALTH CARE EDUCATION/TRAINING PROGRAM

## 2024-11-17 RX ORDER — SODIUM CHLORIDE, SODIUM LACTATE, POTASSIUM CHLORIDE, CALCIUM CHLORIDE 600; 310; 30; 20 MG/100ML; MG/100ML; MG/100ML; MG/100ML
INJECTION, SOLUTION INTRAVENOUS CONTINUOUS
Status: ACTIVE | OUTPATIENT
Start: 2024-11-17 | End: 2024-11-17

## 2024-11-17 RX ORDER — MAGNESIUM SULFATE HEPTAHYDRATE 40 MG/ML
2 INJECTION, SOLUTION INTRAVENOUS ONCE
Status: COMPLETED | OUTPATIENT
Start: 2024-11-17 | End: 2024-11-17

## 2024-11-17 RX ORDER — HALOPERIDOL 5 MG/ML
2 INJECTION INTRAMUSCULAR ONCE
Status: COMPLETED | OUTPATIENT
Start: 2024-11-17 | End: 2024-11-17

## 2024-11-17 RX ORDER — POTASSIUM CHLORIDE 20 MEQ/1
40 TABLET, EXTENDED RELEASE ORAL ONCE
Status: COMPLETED | OUTPATIENT
Start: 2024-11-17 | End: 2024-11-17

## 2024-11-17 RX ADMIN — DIAZEPAM 10 MG: 5 TABLET ORAL at 09:11

## 2024-11-17 RX ADMIN — DIAZEPAM 10 MG: 5 TABLET ORAL at 06:11

## 2024-11-17 RX ADMIN — APIXABAN 5 MG: 5 TABLET, FILM COATED ORAL at 08:11

## 2024-11-17 RX ADMIN — PANTOPRAZOLE SODIUM 40 MG: 40 TABLET, DELAYED RELEASE ORAL at 08:11

## 2024-11-17 RX ADMIN — DIAZEPAM 10 MG: 5 TABLET ORAL at 01:11

## 2024-11-17 RX ADMIN — MAGNESIUM SULFATE HEPTAHYDRATE 2 G: 40 INJECTION, SOLUTION INTRAVENOUS at 01:11

## 2024-11-17 RX ADMIN — FOLIC ACID 1 MG: 1 TABLET ORAL at 08:11

## 2024-11-17 RX ADMIN — MUPIROCIN: 20 OINTMENT TOPICAL at 09:11

## 2024-11-17 RX ADMIN — Medication 16 G: at 05:11

## 2024-11-17 RX ADMIN — POTASSIUM CHLORIDE 40 MEQ: 1500 TABLET, EXTENDED RELEASE ORAL at 01:11

## 2024-11-17 RX ADMIN — SODIUM CHLORIDE, POTASSIUM CHLORIDE, SODIUM LACTATE AND CALCIUM CHLORIDE: 600; 310; 30; 20 INJECTION, SOLUTION INTRAVENOUS at 08:11

## 2024-11-17 RX ADMIN — MUPIROCIN: 20 OINTMENT TOPICAL at 08:11

## 2024-11-17 RX ADMIN — DRONEDARONE 400 MG: 400 TABLET, FILM COATED ORAL at 09:11

## 2024-11-17 RX ADMIN — THIAMINE HYDROCHLORIDE 250 MG: 100 INJECTION, SOLUTION INTRAMUSCULAR; INTRAVENOUS at 09:11

## 2024-11-17 RX ADMIN — HALOPERIDOL LACTATE 2 MG: 5 INJECTION, SOLUTION INTRAMUSCULAR at 10:11

## 2024-11-17 RX ADMIN — LORAZEPAM 2 MG: 2 INJECTION INTRAMUSCULAR; INTRAVENOUS at 08:11

## 2024-11-17 RX ADMIN — LORAZEPAM 2 MG: 2 INJECTION INTRAMUSCULAR; INTRAVENOUS at 03:11

## 2024-11-17 RX ADMIN — HALOPERIDOL LACTATE 2 MG: 5 INJECTION, SOLUTION INTRAMUSCULAR at 08:11

## 2024-11-17 RX ADMIN — THERA TABS 1 TABLET: TAB at 08:11

## 2024-11-17 NOTE — ASSESSMENT & PLAN NOTE
, controlled.  Latest blood pressure and vitals reviewed-   Temp:  [97 °F (36.1 °C)-98.3 °F (36.8 °C)]   Pulse:  [51-68]   Resp:  [14-31]   BP: ()/(41-79)   SpO2:  [98 %-100 %] .   not on medications    PRN meds if BP> 180/110 mm HG

## 2024-11-17 NOTE — ASSESSMENT & PLAN NOTE
non aniongap  . patient with bicarbonate   Recent Labs   Lab 11/16/24  0503 11/16/24  1118 11/17/24  0853   CO2 21* 14* 27    .started on bicarbonate drip x 5h. resolved

## 2024-11-17 NOTE — CARE UPDATE
Patient sustained R ankle minimally displaced bimalleolar ankle fracture on 9/10/24.     Per recommendations from Dr. Rodriguez, who he saw on 9/17:  Fracture appears to be stable on weight-bearing stress x-rays today. Continue with conservative management at this time.    Recommend patient wear his tall cam boot at ALL times while weight-bearing. WBAT in boot. Utilize a walker when ambulating, recommend working with PT.   --  Hung Gandhi MD  PGY-3 [facility]

## 2024-11-17 NOTE — ASSESSMENT & PLAN NOTE
Results for orders placed during the hospital encounter of 11/14/24  Echo  Interpretation Summary    Left Ventricle: The left ventricle is normal in size. Normal wall thickness. There is concentric remodeling. There is low normal systolic function with a visually estimated ejection fraction of 50 - 55%. There is normal diastolic function.    Right Ventricle: Normal right ventricular cavity size. Wall thickness is normal. Systolic function is normal.    Mild biatrial enlargement    Aortic Valve: The aortic valve is a trileaflet valve. There is mild aortic valve sclerosis.    Pulmonary Artery: The estimated pulmonary artery systolic pressure is 25 mmHg.    IVC/SVC: Normal venous pressure at 3 mmHg.     -- No evidence of acute exacerbation      11/17   not had any ischemic workup since his EF became mildly reduced sometime in 2021.

## 2024-11-17 NOTE — PLAN OF CARE
Problem: Adult Inpatient Plan of Care  Goal: Plan of Care Review  Outcome: Progressing  Goal: Patient-Specific Goal (Individualized)  Outcome: Progressing  Goal: Absence of Hospital-Acquired Illness or Injury  Outcome: Progressing  Goal: Optimal Comfort and Wellbeing  Outcome: Progressing  Goal: Readiness for Transition of Care  Outcome: Progressing     Problem: Fall Injury Risk  Goal: Absence of Fall and Fall-Related Injury  Outcome: Progressing     Problem: Restraint, Nonviolent  Goal: Absence of Harm or Injury  Outcome: Progressing     Problem: Diabetes Comorbidity  Goal: Blood Glucose Level Within Targeted Range  Outcome: Progressing     Problem: Acute Kidney Injury/Impairment  Goal: Fluid and Electrolyte Balance  Outcome: Progressing  Goal: Improved Oral Intake  Outcome: Progressing  Goal: Effective Renal Function  Outcome: Progressing     Problem: Wound  Goal: Optimal Coping  Outcome: Progressing  Goal: Optimal Functional Ability  Outcome: Progressing  Goal: Absence of Infection Signs and Symptoms  Outcome: Progressing  Goal: Improved Oral Intake  Outcome: Progressing  Goal: Optimal Pain Control and Function  Outcome: Progressing  Goal: Skin Health and Integrity  Outcome: Progressing  Goal: Optimal Wound Healing  Outcome: Progressing     Problem: Skin Injury Risk Increased  Goal: Skin Health and Integrity  Outcome: Progressing

## 2024-11-17 NOTE — ASSESSMENT & PLAN NOTE
11/17   orthopedic f/u 9/17 -acute nondisplaced long spiral fracture of the lateral malleolus at the level of the syndesmosis. Nondisplaced avulsion fracture involving the tip of the medial malleolus. Ankle mortise remains congruent on weight-bearing stress x-rays. orthopedics consulted for weight bearing status

## 2024-11-17 NOTE — PROGRESS NOTES
[facility] - Telemetry [facility] Medicine  Progress Note    Patient Name: Donald Warren Abadie  MRN: [id number]  Patient Class: IP- Inpatient   Admission Date: 11/14/2024  Length of Stay: 3 days  Attending Physician: Mehdi Ramey MD  Primary Care Provider: Bacilio Larry MD        Subjective:     Principal Problem:Alcohol withdrawal syndrome with complication        HPI:  Donald Abadie is a 69-year-old male with a past medical history of Afib, gout, anxiety, left sided RCC s/p partial nephrectomy (2014), HTN, HLD, subdural hematoma (9/20/2024), and alcoholism who presents to the ED with concern of alcohol withdrawal. He has been trying to cut down on his drinking after a recent fall with a head injury and subdural but typically drinks about 5-6 beers daily. His daughter came to his house because he was short of breath and was complaining that his heart was racing. She is a nurse and found him in Afib with elevated rate. He fell the night prior to admission as well. She is worried about him being able to detox at home. He states that he is feeling short of breath and anxious. He had minimal confusion at the time of arrival to the ED.     In the ED, , Na 122, Cl 90, CO2 20, glucose 130, BUN 6, Creatinine 1.6, T.Bili 1.6, AST 67, Magnesium 1.3. CT head with no evidence of acute intracranial hemorrhage. At the time of MICU evaluation the patient is tachycardic, tachypneic, and diaphoretic despite 4 mg IV ativan and 5 mg PO diazepam. He exhibits hallucinations and nonsensical speech upon evaluation.         Overview/Hospital Course:    The patient was admitted to hospital medicine for further management of his acute alcohol withdrawals. He was started on scheduled and as needed benzodiazepines as well as a precedex gtt. His hyponatremia was trended and treated with IV fluids and holding his home SSRI. His withdrawal symptoms improved and precedex was weaned off. He was continued on a valium taper with  PRN ativan as needed.     No acute overnight events. Patient only required 1 dose of PRN ativan 2 mg for CIWA > 8. Precedex gtt weaning.    11/17 Transfer to [facility] medicine  [facility] of A-fib, gout, anxiety, h/o RCC s/p partial nephrectomy (2014), HTN, HLD, subdural hematoma in September 2024 presenting with acute alcohol withdrawals. Initially required a precedex gtt on top of scheduled and as needed benzos, now doing well on scheduled benzo taper with additional PRN ativan driven by CIWA. Continue diazepam taper with  PRN ativan for breakthrough CIWA scoring. RUQ US ordered. no historical cirrhosis but his mild hyponatremia is concerning for cirrhosis w/ his alcohol history. given bicarb drip for nonanion gap metabolic acidosis. Addiction psychiatry consulted. recurrent falls. PT/OT consulted. EP follow up to discuss frequent falls while on Eliquis.  not had any ischemic workup since his EF became mildly reduced sometime in 2021. recent neuropsychiatry eval - needs help with most IADLs, indicating a diagnosis of Major Neurocognitive Disorder/dementia, mild stage likely with severe alcohol use . encourage alcohol cessation. CT head 11/14 -No evidence of acute intracranial hemorrhage as above.  orthopedic f/u 9/17 -acute nondisplaced long spiral fracture of the lateral malleolus at the level of the syndesmosis. Nondisplaced avulsion fracture involving the tip of the medial malleolus. Ankle mortise remains congruent on weight-bearing stress x-rays. orthopedics consulted for weight bearing status . orthostatic hypotension +. continue gentle LR hydration .sodium improved to 135. K and Mg replaced         Review of Systems:   Pain scale:   Constitutional:  fever,  chills, headache, vision loss, hearing loss, weight loss, Generalized weakness, falls, loss of smell, loss of taste, poor appetite,  sore throat  Respiratory: cough, shortness of breath.   Cardiovascular: chest pain, dizziness, palpitations, orthopnea, swelling  of feet, syncope  Gastrointestinal: nausea, vomiting, abdominal pain, diarrhea, black stool,  blood in stool, change in bowel habits, constipation  Genitourinary: hematuria, dysuria, urgency, frequency  Integument/Breast: rash,  pruritis  Hematologic/Lymphatic: easy bruising, lymphadenopathy  Musculoskeletal: arthralgias , myalgias, back pain, neck pain, knee pain  Neurological: confusion, seizures, tremors, slurred speech  Behavioral/Psych:  depression, anxiety, auditory or visual hallucinations     OBJECTIVE:     Physical Exam:  Body mass index is 33.15 kg/m².    Constitutional: Appears well-developed and well-nourished. obesity  Head: Normocephalic and atraumatic.   Neck: Normal range of motion. Neck supple.   Cardiovascular: Normal heart rate.  Regular heart rhythm.  Pulmonary/Chest: Effort normal.   Abdominal: No distension.  No tenderness  Musculoskeletal: Normal range of motion. No edema.   Neurological: Alert and oriented to person, place, and time.   Skin: Skin is warm and dry.   Psychiatric: Normal mood and affect. Behavior is normal.                  Vital Signs  Temp: 97.7 °F (36.5 °C) (11/17/24 0810)  Pulse: 66 (11/17/24 1046)  Resp: 18 (11/17/24 0810)  BP: (S) (!) 91/53 (11/17/24 0813)  SpO2: 100 % (11/17/24 0810)     24 Hour VS Range    Temp:  [97.4 °F (36.3 °C)-98.3 °F (36.8 °C)]   Pulse:  [51-72]   Resp:  [15-31]   BP: ()/(51-79)   SpO2:  [99 %-100 %]     Intake/Output Summary (Last 24 hours) at 11/17/2024 1220  Last data filed at 11/17/2024 0650  Gross per 24 hour   Intake 324.76 ml   Output 1250 ml   Net -925.24 ml         I/O This Shift:  No intake/output data recorded.    Wt Readings from Last 3 Encounters:   11/15/24 77 kg (169 lb 12.1 oz)   11/07/24 77.5 kg (170 lb 13.7 oz)   10/03/24 78.4 kg (172 lb 13.5 oz)       I have personally reviewed the vitals and recorded Intake/Output     Laboratory/Diagnostic Data:    CBC/Anemia Labs: Coags:    Recent Labs   Lab 11/15/24  0353 11/16/24  0508  "11/17/24  0853   WBC 5.42 6.17 5.62   HGB 14.3 13.4* 13.8*   HCT 40.1 38.6* 40.3   * 95* 117*   MCV 92 94 96   RDW 13.3 13.5 13.7    No results for input(s): "PT", "INR", "APTT" in the last 168 hours.     Chemistries: ABG:   Recent Labs   Lab 11/15/24  0353 11/15/24  1214 11/16/24  0503 11/16/24  1118 11/17/24  0853   *   < > 127* 129* 135*   K 3.1*   < > 4.6 4.8 3.3*   CL 97   < > 100 101 97   CO2 18*   < > 21* 14* 27   BUN 8   < > 9 9 6*   CREATININE 1.1   < > 1.0 1.1 1.0   CALCIUM 8.5*   < > 8.9 8.8 9.1   PROT 5.9*  --  6.2  --  6.6   BILITOT 1.3*  --  1.2*  --  1.0   ALKPHOS 64  --  63  --  68   ALT 21  --  22  --  21   AST 47*  --  38  --  33   MG 2.0  --  1.5*  --  1.4*   PHOS 3.6  --  2.8  --   --     < > = values in this interval not displayed.    No results for input(s): "PH", "PCO2", "PO2", "HCO3", "POCSATURATED", "BE" in the last 168 hours.     POCT Glucose: HbA1c:    Recent Labs   Lab 11/15/24  1620 11/15/24  2128 11/16/24  0731 11/16/24  1658 11/16/24  2142 11/17/24  0806   POCTGLUCOSE 165* 98 103 78 129* 124*    Hemoglobin A1C   Date Value Ref Range Status   11/15/2024 5.3 4.0 - 5.6 % Final     Comment:     ADA Screening Guidelines:  5.7-6.4%  Consistent with prediabetes  >or=6.5%  Consistent with diabetes    High levels of fetal hemoglobin interfere with the HbA1C  assay. Heterozygous hemoglobin variants (HbS, HgC, etc)do  not significantly interfere with this assay.   However, presence of multiple variants may affect accuracy.     02/29/2024 5.9 (H) 4.0 - 5.6 % Final     Comment:     ADA Screening Guidelines:  5.7-6.4%  Consistent with prediabetes  >or=6.5%  Consistent with diabetes    High levels of fetal hemoglobin interfere with the HbA1C  assay. Heterozygous hemoglobin variants (HbS, HgC, etc)do  not significantly interfere with this assay.   However, presence of multiple variants may affect accuracy.     10/29/2023 8.0 (H) 4.0 - 5.6 % Final     Comment:     ADA Screening " "Guidelines:  5.7-6.4%  Consistent with prediabetes  >or=6.5%  Consistent with diabetes    High levels of fetal hemoglobin interfere with the HbA1C  assay. Heterozygous hemoglobin variants (HbS, HgC, etc)do  not significantly interfere with this assay.   However, presence of multiple variants may affect accuracy.          Cardiac Enzymes: Ejection Fractions:    Recent Labs     11/14/24  1429 11/15/24  0352   CPK  --  124   TROPONINI 0.006  --     EF   Date Value Ref Range Status   11/30/2022 45 % Final   11/01/2021 45 % Final          No results for input(s): "COLORU", "APPEARANCEUA", "PHUR", "SPECGRAV", "PROTEINUA", "GLUCUA", "KETONESU", "BILIRUBINUA", "OCCULTUA", "NITRITE", "UROBILINOGEN", "LEUKOCYTESUR", "RBCUA", "WBCUA", "BACTERIA", "SQUAMEPITHEL", "HYALINECASTS" in the last 48 hours.    Invalid input(s): "WRIGHTSUR"    Lactate (Lactic Acid) (mmol/L)   Date Value   08/04/2024 1.2   11/29/2022 2.5 (H)   11/29/2022 5.7 (HH)   03/17/2019 0.9   03/17/2019 0.7     BNP (pg/mL)   Date Value   11/14/2024 371 (H)   04/22/2024 516 (H)   04/02/2024 529 (H)   10/28/2023 226 (H)   11/29/2022 324 (H)     Sed Rate (mm/hr)   Date Value   10/04/2006 5   12/21/2005 28 (H)     D-Dimer (mg/L FEU)   Date Value   10/31/2021 0.46   09/25/2018 0.42     Ferritin   Date Value   02/16/2017 858 ng/mL (H)   11/16/2016 1,344 ng/mL (H)   06/28/2004 706.7 ng/ml (H)     No results found for: "LDH"  Troponin I (ng/mL)   Date Value   11/14/2024 0.006   11/14/2024 0.006   09/12/2024 <0.012   09/12/2024 <0.012   08/04/2024 <0.006   04/22/2024 <0.006   04/02/2024 <0.006   10/28/2023 <0.006     CPK (U/L)   Date Value   11/15/2024 124   06/21/2023 72   03/15/2019 189     CK (U/L)   Date Value   06/13/2024 64     No results found. However, due to the size of the patient record, not all encounters were searched. Please check Results Review for a complete set of results.  SARS-CoV2 (COVID-19) Qualitative PCR (no units)   Date Value   10/29/2023 Not " Detected   11/29/2022 Not Detected     POC Rapid COVID (no units)   Date Value   10/31/2021 Negative       Microbiology labs for the last week  Microbiology Results (last 7 days)       ** No results found for the last 168 hours. **            Reviewed and noted in plan where applicable- Please see chart for full lab data.    Lines/Drains:       Peripheral IV - Single Lumen 11/14/24 1650 20 G Anterior;Right Forearm (Active)   Site Assessment Clean;Dry;Intact 11/17/24 0400   Line Securement Device Secured with sutureless device 11/14/24 1930   Extremity Assessment Distal to IV No abnormal discoloration 11/16/24 2000   Line Status Saline locked 11/17/24 0400   Dressing Status Clean;Dry;Intact 11/17/24 0400   Dressing Intervention Integrity maintained 11/17/24 0400   Dressing Change Due 11/18/24 11/16/24 2000   Site Change Due 11/18/24 11/16/24 2000   Reason Not Rotated Not due 11/16/24 2000   Number of days: 2            Peripheral IV - Single Lumen 11/14/24 1746 20 G Right Hand (Active)   Site Assessment Clean;Dry;Intact 11/17/24 0400   Line Securement Device Secured with sutureless device 11/14/24 1930   Extremity Assessment Distal to IV No abnormal discoloration 11/16/24 2000   Line Status Saline locked 11/17/24 0400   Dressing Status Clean;Dry;Intact 11/17/24 0400   Dressing Intervention Integrity maintained 11/17/24 0400   Dressing Change Due 11/18/24 11/16/24 2000   Site Change Due 11/18/24 11/16/24 2000   Reason Not Rotated Not due 11/16/24 2000   Number of days: 2       Male External Urinary Catheter 11/14/24 1800 (Active)   Collection Container Other (Comment) 11/16/24 1823   Securement Method other (see comments) 11/16/24 1823   Skin no redness;no breakdown 11/16/24 2000   Tolerance no signs/symptoms of discomfort 11/16/24 2000   Output (mL) 300 mL 11/16/24 1823   Catheter Change Date 11/16/24 11/16/24 2000   Catheter Change Time 1824 11/16/24 2000   Number of days: 2       Imaging  ECG Results               EKG 12-lead (Final result)        Collection Time Result Time QRS Duration OHS QTC Calculation    11/14/24 11:55:34 11/14/24 14:10:17 82 430                     Final result by Interface, Lab In [facility] (11/14/24 14:10:20)                   Narrative:    Test Reason :    Vent. Rate : 100 BPM     Atrial Rate : 107 BPM     P-R Int :    ms          QRS Dur :  82 ms      QT Int : 334 ms       P-R-T Axes :     -4  -6 degrees    QTcB Int : 430 ms    Baseline Artifact  Atrial fibrillation  Abnormal ECG  When compared with ECG of 14-Nov-2024 11:54,  No significant change was found  Confirmed by Quique Hernandez ([id number]) on 11/14/2024 2:10:16 PM    Referred By: AAAREFERRAL SELF           Confirmed By: Quique Hernandez                                     EKG 12-lead (Final result)        Collection Time Result Time QRS Duration OHS QTC Calculation    11/14/24 11:54:01 11/14/24 14:10:03 98 499                     Final result by Interface, Lab In [facility] (11/14/24 14:10:12)                   Narrative:    Test Reason : R07.9,    Vent. Rate : 106 BPM     Atrial Rate : 258 BPM     P-R Int :    ms          QRS Dur :  98 ms      QT Int : 376 ms       P-R-T Axes :     -4   6 degrees    QTcB Int : 499 ms    Baseline Artifact  Atrial fibrillation with rapid ventricular response  Abnormal ECG  When compared with ECG of 14-Nov-2024 11:14,  No significant change was found  Confirmed by Quique Hernandez ([id number]) on 11/14/2024 2:09:58 PM    Referred By: AAAREFERRAL SELF           Confirmed By: Quique Hernandez                                     EKG 12-lead (Final result)        Collection Time Result Time QRS Duration OHS QTC Calculation    11/14/24 11:14:35 11/14/24 14:09:47 86 474                     Final result by Interface, Lab In [facility] (11/14/24 14:09:51)                   Narrative:    Test Reason :    Vent. Rate : 102 BPM     Atrial Rate : 100 BPM     P-R Int :    ms          QRS Dur :  86 ms      QT Int : 364 ms       P-R-T Axes :      1    0 degrees    QTcB Int : 474 ms    Baseline Artifact  Atrial fibrillation with rapid ventricular response  Abnormal ECG  When compared with ECG of 14-Nov-2024 10:06,  No significant change was found  Confirmed by Quique Hernandez ([id number]) on 11/14/2024 2:09:44 PM    Referred By: KUNAL SELF           Confirmed By: Quique Hernandez                                     EKG 12-lead (Final result)        Collection Time Result Time QRS Duration OHS QTC Calculation    11/14/24 10:06:07 11/14/24 14:09:30 82 481                     Final result by Interface, Lab In [facility] (11/14/24 14:09:38)                   Narrative:    Test Reason : R06.02,    Vent. Rate : 111 BPM     Atrial Rate :    BPM     P-R Int :    ms          QRS Dur :  82 ms      QT Int : 354 ms       P-R-T Axes :      0  -3 degrees    QTcB Int : 481 ms    Atrial fibrillation with rapid ventricular response  Abnormal ECG  No previous ECGs available  Confirmed by Quique Hernandez ([id number]) on 11/14/2024 2:09:28 PM    Referred By: KUNAL SELF           Confirmed By: Quique Hernandez                                    Results for orders placed during the hospital encounter of 11/14/24    Echo    Interpretation Summary    Left Ventricle: The left ventricle is normal in size. Normal wall thickness. There is concentric remodeling. There is low normal systolic function with a visually estimated ejection fraction of 50 - 55%. There is normal diastolic function.    Right Ventricle: Normal right ventricular cavity size. Wall thickness is normal. Systolic function is normal.    Mild biatrial enlargement    Aortic Valve: The aortic valve is a trileaflet valve. There is mild aortic valve sclerosis.    Pulmonary Artery: The estimated pulmonary artery systolic pressure is 25 mmHg.    IVC/SVC: Normal venous pressure at 3 mmHg.      US Abdomen Limited  Narrative: EXAMINATION:  US ABDOMEN LIMITED    CLINICAL HISTORY:  evaluation for cirrhosis, RUQ  ultrasound;.    TECHNIQUE:  Limited ultrasound of the right upper quadrant of the abdomen including pancreas, liver, gallbladder, common bile duct was performed.    COMPARISON:  Ultrasound 10/09/2024, CT 05/02/2024    FINDINGS:  Pancreas: The visualized portions of pancreas appear normal.    Liver: Normal in size, measuring 17.4 cm. Homogeneous echotexture. No focal hepatic lesions. Hepatorenal index 1.1.    Gallbladder: Cholelithiasis.  No gallbladder wall thickening, pericholecystic fluid or sonographic Steel sign.    Biliary system: The common duct is not dilated, measuring 3 mm.  No intrahepatic ductal dilatation.    Spleen: Normal in size with a heterogeneous echotexture, measuring 10.6 x 6.1 cm.    Miscellaneous: No ascites.  Impression: Cholelithiasis without secondary signs of cholecystitis.    Electronically signed by resident: Jamel Villalpando  Date:    11/17/2024  Time:    08:04    Electronically signed by: Quique Merino Jr  Date:    11/17/2024  Time:    11:12      Labs, Imaging, EKG and Diagnostic results from 11/17/2024 were reviewed.    Medications:  Medication list was reviewed and changes noted under Assessment/Plan.  No current facility-administered medications on file prior to encounter.     Current Outpatient Medications on File Prior to Encounter   Medication Sig Dispense Refill    allopurinoL (ZYLOPRIM) 100 MG tablet TAKE 2 TABLETS(200 MG) BY MOUTH EVERY  tablet 0    cyanocobalamin (VITAMIN B-12) 1000 MCG tablet Take 1 tablet (1,000 mcg total) by mouth once daily.      diclofenac sodium (VOLTAREN) 1 % Gel Apply 2 g topically 3 (three) times daily as needed (Right knee - hand pain).      dronedarone (MULTAQ) 400 mg Tab Take 1 tablet (400 mg total) by mouth 2 (two) times daily with meals. (Patient taking differently: Take 400 mg by mouth once daily.) 60 tablet 11    ELIQUIS 5 mg Tab TAKE 1 TABLET BY MOUTH TWICE DAILY (Patient taking differently: Take 5 mg by mouth 2 (two) times  daily.) 180 tablet 3    folic acid (FOLVITE) 1 MG tablet Take 1 tablet (1 mg total) by mouth once daily. 30 tablet 11    pantoprazole (PROTONIX) 40 MG tablet Take 1 tablet (40 mg total) by mouth once daily. 90 tablet 3    rOPINIRole (REQUIP) 1 MG tablet TAKE 1 TABLET(1 MG) BY MOUTH EVERY EVENING (Patient taking differently: Take 1 tablet by mouth daily as needed (restless leg).) 90 tablet 3    rosuvastatin (CRESTOR) 20 MG tablet TAKE 1 TABLET(20 MG) BY MOUTH EVERY DAY 90 tablet 3    sertraline (ZOLOFT) 100 MG tablet Take 1 tablet (100 mg total) by mouth once daily. 90 tablet 3    tamsulosin (FLOMAX) 0.4 mg Cap TAKE 1 CAPSULE(0.4 MG) BY MOUTH EVERY DAY (Patient taking differently: Take 0.4 mg by mouth every evening.) 90 capsule 3    VITAMIN B-1 100 MG tablet TAKE 1 TABLET BY MOUTH ONCE DAILY 30 tablet 2     Scheduled Medications:  Current Facility-Administered Medications   Medication Dose Route Frequency    apixaban  5 mg Oral BID    diazePAM  10 mg Oral Q8H    Followed by    [START ON 11/18/2024] diazePAM  10 mg Oral Q12H    Followed by    [START ON 11/19/2024] diazePAM  5 mg Oral Q12H    Followed by    [START ON 11/20/2024] diazePAM  5 mg Oral Daily    dronedarone  400 mg Oral Daily    folic acid  1 mg Oral Daily    magnesium sulfate IVPB  2 g Intravenous Once    multivitamin  1 tablet Oral Daily    mupirocin   Nasal BID    pantoprazole  40 mg Oral Daily    potassium chloride  40 mEq Oral Once    thiamine (B-1) 250 mg in D5W 100 mL IVPB  250 mg Intravenous Daily    Followed by    [START ON 11/20/2024] thiamine  100 mg Oral Daily     PRN:   Current Facility-Administered Medications:     dextrose 10%, 12.5 g, Intravenous, PRN    dextrose 10%, 25 g, Intravenous, PRN    diclofenac sodium, 2 g, Topical (Top), TID PRN    glucagon (human recombinant), 1 mg, Intramuscular, PRN    glucose, 16 g, Oral, PRN    glucose, 24 g, Oral, PRN    insulin aspart U-100, 0-5 Units, Subcutaneous, QID (AC + HS) PRN    lorazepam, 2 mg,  "Intravenous, Q1H PRN    ondansetron, 4 mg, Intravenous, Q8H PRN    sodium chloride 0.9%, 10 mL, Intravenous, PRN  Infusions:    lactated ringers   Intravenous Continuous   Paused at 11/17/24 1019     Estimated Creatinine Clearance: 59.1 mL/min (based on SCr of 1 mg/dL).             Assessment/Plan:      * Alcohol withdrawal syndrome with complication     presented to ED with c/o shortness of breath and "heart racing". Has been trying to cut back on alcohol use. Now in alcohol withdrawals.      11/15: Patient required 14 mg PRN ativan since admission in addition to scheduled diazepam. Plan to increase scheduled diazepam, continue titrating PRN ativan and precedex gtt.  11/16: Precedex weaned off, required 1 dose of PRN ativan overnight.     -- Diazepam taper: 10 mg TID -> 10 mg BID -> 5 mg BID -> 5 mg daily -> stop  -- Continue PRN ativan 2 mg for CIWA > 8  -- Stop precedex gtt  --Daily thiamine, folic acid, multivitamin  --CIWA protocol q4hrs       Closed fracture of right ankle with routine healing  11/17   orthopedic f/u 9/17 -acute nondisplaced long spiral fracture of the lateral malleolus at the level of the syndesmosis. Nondisplaced avulsion fracture involving the tip of the medial malleolus. Ankle mortise remains congruent on weight-bearing stress x-rays. orthopedics consulted for weight bearing status     orthopedic evaluation - Recommend patient wear his tall cam boot at ALL times while weight-bearing. WBAT in boot. Utilize a walker when ambulating, recommend working with PT.       Orthostatic hypotension    11/16 SBP 70/80s on sitting up in the chair, improved to 99/55 on lying down.  IV hydration   11/17 BP improved , orthostatic hypotension +. continue gentle LR hydration     Falls frequently  11/16 daughter reports 6 falls recently.  x ray shoulder right 8/24 -anterior inferior shoulder dislocation with impaction in Hill-Sachs deformity on the humeral head. No definite fracture.  Xray right foot 9/14 " -Mildly displaced distal fibular fracture -     PT/OT eval -likely needs SNF      11/17  EP follow up to discuss frequent falls while on Eliquis.      Thrombocytopenia  The likely etiology of thrombocytopenia is alcohol abuse. monitor   Recent Labs     11/15/24  0353 11/16/24  0503 11/17/24  0853   * 95* 117*       Heart failure with mildly reduced ejection fraction (HFmrEF)    Results for orders placed during the hospital encounter of 11/14/24  Echo  Interpretation Summary    Left Ventricle: The left ventricle is normal in size. Normal wall thickness. There is concentric remodeling. There is low normal systolic function with a visually estimated ejection fraction of 50 - 55%. There is normal diastolic function.    Right Ventricle: Normal right ventricular cavity size. Wall thickness is normal. Systolic function is normal.    Mild biatrial enlargement    Aortic Valve: The aortic valve is a trileaflet valve. There is mild aortic valve sclerosis.    Pulmonary Artery: The estimated pulmonary artery systolic pressure is 25 mmHg.    IVC/SVC: Normal venous pressure at 3 mmHg.     -- No evidence of acute exacerbation      11/17   not had any ischemic workup since his EF became mildly reduced sometime in 2021.     Primary hypertension  , controlled.  Latest blood pressure and vitals reviewed-   Temp:  [97 °F (36.1 °C)-98.3 °F (36.8 °C)]   Pulse:  [51-68]   Resp:  [14-31]   BP: ()/(41-79)   SpO2:  [98 %-100 %] .   not on medications    PRN meds if BP> 180/110 mm HG     Hypomagnesemia  replaced   Recent Labs   Lab 11/17/24  0853   MG 1.4*          Metabolic acidosis  non aniongap  . patient with bicarbonate   Recent Labs   Lab 11/16/24  0503 11/16/24  1118 11/17/24  0853   CO2 21* 14* 27    .started on bicarbonate drip x 5h. resolved       Type 2 diabetes mellitus without complication, without long-term current use of insulin  Patient's FSGs are controlled on current hypoglycemics.   Last A1c reviewed-   Lab  Results   Component Value Date    HGBA1C 5.3 11/15/2024    HGBA1C 5.9 (H) 02/29/2024    HGBA1C 8.0 (H) 10/29/2023     Will hold PO hypoglycemics and will start correctional scale insulin  Most recent fingerstick glucose reviewed-   Recent Labs   Lab 11/16/24  1658 11/16/24  2142 11/17/24  0806   POCTGLUCOSE 78 129* 124*     currently on   Antihyperglycemics (From admission, onward)      Start     Stop Route Frequency Ordered    11/14/24 1554  insulin aspart U-100 pen 0-5 Units         -- SubQ Before meals & nightly PRN 11/14/24 1455             Hyponatremia    Recent Labs     11/16/24  0503 11/16/24  1118 11/17/24  0853   * 129* 135*     Na 122 on admission. Serum osm 264, Urine osm 409, Urine Na 30. Not fully explained by alcohol use / beer potomania, suspect component of SIADH as well.     Na improving but remains hyponatremic. Will check RUQ US to evaluate for cirrhosis given alcohol use history.     --Daily CMP  -- Hold SSRI  -- Follow-up RUQ US       Paroxysmal atrial fibrillation    Hx of atrial fibrillation   --Continue home dronedarone  -- Continue home eliquis  -- Telemetry      11/17 EP follow up to discuss frequent falls while on Eliquis.        VTE Risk Mitigation (From admission, onward)           Ordered     apixaban tablet 5 mg  2 times daily         11/14/24 1431     IP VTE HIGH RISK PATIENT  Once         11/14/24 1431     Place sequential compression device  Until discontinued         11/14/24 1431                    Discharge Planning   DALY: 11/19/2024     Code Status: Full Code   Is the patient medically ready for discharge?:     Reason for patient still in hospital (select all that apply): Treatment  Discharge Plan A: Home   Discharge Delays: (!) Patient and Family Barriers              Mehdi Ramey MD  Department of Hospital Medicine   [facility] - Telemetry Stepdown

## 2024-11-17 NOTE — ASSESSMENT & PLAN NOTE
Hx of atrial fibrillation   --Continue home dronedarone  -- Continue home eliquis  -- Telemetry      11/17 EP follow up to discuss frequent falls while on Eliquis.

## 2024-11-17 NOTE — PROGRESS NOTES
11/16/24 1638 11/16/24 2003 11/16/24 2320   Vital Signs   BP (!) 142/79 (!) 100/51 114/61   MAP (mmHg) 105 68 79   BP Location Left arm  --   --    BP Method Automatic  --   --    Patient Position Lying  --   --    Orthostatic VS No  --   --    Is Patient Symptomatic in this Position?  --   --   --       11/17/24 0435 11/17/24 0630 11/17/24 0810   Vital Signs   /70 112/71 128/70   MAP (mmHg) 89  --  88   BP Location  --   --  Left arm   BP Method  --   --  Automatic   Patient Position  --   --  Lying  (see note)   Orthostatic VS  --  Yes Yes   Is Patient Symptomatic in this Position?  --   --  No      11/17/24 0812 11/17/24 0813   Vital Signs   /67 (!) 91/53   MAP (mmHg) 91 64   BP Location Left arm Left arm   BP Method Automatic Automatic   Patient Position Sitting Standing   Orthostatic VS Yes Yes   Is Patient Symptomatic in this Position? No No

## 2024-11-17 NOTE — ASSESSMENT & PLAN NOTE
Patient's FSGs are controlled on current hypoglycemics.   Last A1c reviewed-   Lab Results   Component Value Date    HGBA1C 5.3 11/15/2024    HGBA1C 5.9 (H) 02/29/2024    HGBA1C 8.0 (H) 10/29/2023     Will hold PO hypoglycemics and will start correctional scale insulin  Most recent fingerstick glucose reviewed-   Recent Labs   Lab 11/16/24  1658 11/16/24  2142 11/17/24  0806   POCTGLUCOSE 78 129* 124*     currently on   Antihyperglycemics (From admission, onward)      Start     Stop Route Frequency Ordered    11/14/24 155  insulin aspart U-100 pen 0-5 Units         -- SubQ Before meals & nightly PRN 11/14/24 7028

## 2024-11-17 NOTE — ASSESSMENT & PLAN NOTE
11/16 SBP 70/80s on sitting up in the chair, improved to 99/55 on lying down.  IV hydration   11/17 BP improved , orthostatic hypotension +. continue gentle LR hydration

## 2024-11-17 NOTE — NURSING
Patient attempted to get out of bed and knocked both iv sites on Rt arm out. IVF stopped, will need another access asap

## 2024-11-17 NOTE — ASSESSMENT & PLAN NOTE
The likely etiology of thrombocytopenia is alcohol abuse. monitor   Recent Labs     11/15/24  0353 11/16/24  0503 11/17/24  0853   * 95* 117*

## 2024-11-17 NOTE — ASSESSMENT & PLAN NOTE
11/16 daughter reports 6 falls recently.  x ray shoulder right 8/24 -anterior inferior shoulder dislocation with impaction in Hill-Sachs deformity on the humeral head. No definite fracture.  Xray right foot 9/14 -Mildly displaced distal fibular fracture -     PT/OT eval -likely needs SNF      11/17  EP follow up to discuss frequent falls while on Eliquis.

## 2024-11-17 NOTE — ASSESSMENT & PLAN NOTE
Recent Labs     11/16/24  0503 11/16/24  1118 11/17/24  0853   * 129* 135*     Na 122 on admission. Serum osm 264, Urine osm 409, Urine Na 30. Not fully explained by alcohol use / beer potomania, suspect component of SIADH as well.     Na improving but remains hyponatremic. Will check RUQ US to evaluate for cirrhosis given alcohol use history.     --Daily CMP  -- Hold SSRI  -- Follow-up RUQ US

## 2024-11-17 NOTE — ASSESSMENT & PLAN NOTE
11/17   orthopedic f/u 9/17 -acute nondisplaced long spiral fracture of the lateral malleolus at the level of the syndesmosis. Nondisplaced avulsion fracture involving the tip of the medial malleolus. Ankle mortise remains congruent on weight-bearing stress x-rays. orthopedics consulted for weight bearing status     orthopedic evaluation - Recommend patient wear his tall cam boot at ALL times while weight-bearing. WBAT in boot. Utilize a walker when ambulating, recommend working with PT.

## 2024-11-18 LAB
ALBUMIN SERPL BCP-MCNC: 3.7 G/DL (ref 3.5–5.2)
ALP SERPL-CCNC: 61 U/L (ref 40–150)
ALT SERPL W/O P-5'-P-CCNC: 20 U/L (ref 10–44)
ANION GAP SERPL CALC-SCNC: 9 MMOL/L (ref 8–16)
AST SERPL-CCNC: 33 U/L (ref 10–40)
BASOPHILS # BLD AUTO: 0.05 K/UL (ref 0–0.2)
BASOPHILS NFR BLD: 1 % (ref 0–1.9)
BILIRUB SERPL-MCNC: 0.6 MG/DL (ref 0.1–1)
BUN SERPL-MCNC: 7 MG/DL (ref 8–23)
CALCIUM SERPL-MCNC: 9.2 MG/DL (ref 8.7–10.5)
CHLORIDE SERPL-SCNC: 101 MMOL/L (ref 95–110)
CO2 SERPL-SCNC: 28 MMOL/L (ref 23–29)
CREAT SERPL-MCNC: 1.1 MG/DL (ref 0.5–1.4)
DIFFERENTIAL METHOD BLD: ABNORMAL
EOSINOPHIL # BLD AUTO: 0.1 K/UL (ref 0–0.5)
EOSINOPHIL NFR BLD: 1.4 % (ref 0–8)
ERYTHROCYTE [DISTWIDTH] IN BLOOD BY AUTOMATED COUNT: 14 % (ref 11.5–14.5)
EST. GFR  (NO RACE VARIABLE): >60 ML/MIN/1.73 M^2
GLUCOSE SERPL-MCNC: 105 MG/DL (ref 70–110)
HCT VFR BLD AUTO: 38 % (ref 40–54)
HGB BLD-MCNC: 13 G/DL (ref 14–18)
IMM GRANULOCYTES # BLD AUTO: 0.04 K/UL (ref 0–0.04)
IMM GRANULOCYTES NFR BLD AUTO: 0.8 % (ref 0–0.5)
LYMPHOCYTES # BLD AUTO: 1.5 K/UL (ref 1–4.8)
LYMPHOCYTES NFR BLD: 29.4 % (ref 18–48)
MAGNESIUM SERPL-MCNC: 1.7 MG/DL (ref 1.6–2.6)
MCH RBC QN AUTO: 32.4 PG (ref 27–31)
MCHC RBC AUTO-ENTMCNC: 34.2 G/DL (ref 32–36)
MCV RBC AUTO: 95 FL (ref 82–98)
MONOCYTES # BLD AUTO: 0.7 K/UL (ref 0.3–1)
MONOCYTES NFR BLD: 13 % (ref 4–15)
NEUTROPHILS # BLD AUTO: 2.8 K/UL (ref 1.8–7.7)
NEUTROPHILS NFR BLD: 54.4 % (ref 38–73)
NRBC BLD-RTO: 0 /100 WBC
OHS QRS DURATION: 88 MS
OHS QTC CALCULATION: 465 MS
PHOSPHATE SERPL-MCNC: 3 MG/DL (ref 2.7–4.5)
PLATELET # BLD AUTO: 121 K/UL (ref 150–450)
PMV BLD AUTO: 8.9 FL (ref 9.2–12.9)
POCT GLUCOSE: 110 MG/DL (ref 70–110)
POCT GLUCOSE: 112 MG/DL (ref 70–110)
POCT GLUCOSE: 113 MG/DL (ref 70–110)
POCT GLUCOSE: 117 MG/DL (ref 70–110)
POCT GLUCOSE: 224 MG/DL (ref 70–110)
POTASSIUM SERPL-SCNC: 3.3 MMOL/L (ref 3.5–5.1)
PROT SERPL-MCNC: 6.6 G/DL (ref 6–8.4)
RBC # BLD AUTO: 4.01 M/UL (ref 4.6–6.2)
SODIUM SERPL-SCNC: 138 MMOL/L (ref 136–145)
WBC # BLD AUTO: 5.07 K/UL (ref 3.9–12.7)

## 2024-11-18 PROCEDURE — 36415 COLL VENOUS BLD VENIPUNCTURE: CPT | Mod: HCNC

## 2024-11-18 PROCEDURE — 93005 ELECTROCARDIOGRAM TRACING: CPT | Mod: HCNC

## 2024-11-18 PROCEDURE — 97161 PT EVAL LOW COMPLEX 20 MIN: CPT | Mod: HCNC

## 2024-11-18 PROCEDURE — 97116 GAIT TRAINING THERAPY: CPT | Mod: HCNC

## 2024-11-18 PROCEDURE — 25000003 PHARM REV CODE 250: Mod: HCNC | Performed by: EMERGENCY MEDICINE

## 2024-11-18 PROCEDURE — 93010 ELECTROCARDIOGRAM REPORT: CPT | Mod: HCNC,,, | Performed by: STUDENT IN AN ORGANIZED HEALTH CARE EDUCATION/TRAINING PROGRAM

## 2024-11-18 PROCEDURE — 99232 SBSQ HOSP IP/OBS MODERATE 35: CPT | Mod: HCNC,,, | Performed by: PSYCHIATRY & NEUROLOGY

## 2024-11-18 PROCEDURE — 63600175 PHARM REV CODE 636 W HCPCS: Mod: HCNC | Performed by: HOSPITALIST

## 2024-11-18 PROCEDURE — 83735 ASSAY OF MAGNESIUM: CPT | Mod: HCNC

## 2024-11-18 PROCEDURE — 80053 COMPREHEN METABOLIC PANEL: CPT | Mod: HCNC

## 2024-11-18 PROCEDURE — 97165 OT EVAL LOW COMPLEX 30 MIN: CPT | Mod: HCNC

## 2024-11-18 PROCEDURE — 84100 ASSAY OF PHOSPHORUS: CPT | Mod: HCNC

## 2024-11-18 PROCEDURE — 85025 COMPLETE CBC W/AUTO DIFF WBC: CPT | Mod: HCNC

## 2024-11-18 PROCEDURE — 25000003 PHARM REV CODE 250: Mod: HCNC | Performed by: HOSPITALIST

## 2024-11-18 PROCEDURE — 20600001 HC STEP DOWN PRIVATE ROOM: Mod: HCNC

## 2024-11-18 PROCEDURE — 97530 THERAPEUTIC ACTIVITIES: CPT | Mod: HCNC

## 2024-11-18 PROCEDURE — 25000003 PHARM REV CODE 250: Mod: HCNC

## 2024-11-18 PROCEDURE — 63600175 PHARM REV CODE 636 W HCPCS: Mod: HCNC

## 2024-11-18 RX ORDER — MAGNESIUM SULFATE HEPTAHYDRATE 40 MG/ML
2 INJECTION, SOLUTION INTRAVENOUS ONCE
Status: COMPLETED | OUTPATIENT
Start: 2024-11-18 | End: 2024-11-18

## 2024-11-18 RX ORDER — POTASSIUM CHLORIDE 20 MEQ/1
40 TABLET, EXTENDED RELEASE ORAL ONCE
Status: COMPLETED | OUTPATIENT
Start: 2024-11-18 | End: 2024-11-18

## 2024-11-18 RX ADMIN — PANTOPRAZOLE SODIUM 40 MG: 40 TABLET, DELAYED RELEASE ORAL at 09:11

## 2024-11-18 RX ADMIN — DIAZEPAM 10 MG: 5 TABLET ORAL at 08:11

## 2024-11-18 RX ADMIN — DIAZEPAM 10 MG: 5 TABLET ORAL at 09:11

## 2024-11-18 RX ADMIN — DRONEDARONE 400 MG: 400 TABLET, FILM COATED ORAL at 10:11

## 2024-11-18 RX ADMIN — MUPIROCIN: 20 OINTMENT TOPICAL at 09:11

## 2024-11-18 RX ADMIN — MAGNESIUM SULFATE HEPTAHYDRATE 2 G: 40 INJECTION, SOLUTION INTRAVENOUS at 10:11

## 2024-11-18 RX ADMIN — APIXABAN 5 MG: 5 TABLET, FILM COATED ORAL at 08:11

## 2024-11-18 RX ADMIN — THERA TABS 1 TABLET: TAB at 09:11

## 2024-11-18 RX ADMIN — THIAMINE HYDROCHLORIDE 250 MG: 100 INJECTION, SOLUTION INTRAMUSCULAR; INTRAVENOUS at 10:11

## 2024-11-18 RX ADMIN — POTASSIUM CHLORIDE 40 MEQ: 1500 TABLET, EXTENDED RELEASE ORAL at 09:11

## 2024-11-18 RX ADMIN — APIXABAN 5 MG: 5 TABLET, FILM COATED ORAL at 09:11

## 2024-11-18 RX ADMIN — MUPIROCIN: 20 OINTMENT TOPICAL at 08:11

## 2024-11-18 RX ADMIN — FOLIC ACID 1 MG: 1 TABLET ORAL at 09:11

## 2024-11-18 NOTE — PT/OT/SLP EVAL
"Physical Therapy Evaluation    Patient Name:  Donald Warren Abadie   MRN:  [id number]    Recommendations:     Discharge Recommendations: Low Intensity Therapy   Discharge Equipment Recommendations: none   Barriers to discharge:  increased history of falls    Assessment:     Donald Warren Abadie is a 69 y.o. male admitted with a medical diagnosis of Alcohol withdrawal syndrome with complication.  He presents with the following impairments/functional limitations: orthopedic precautions, weakness, impaired functional mobility, impaired balance, decreased lower extremity function, impaired endurance, edema, impaired skin Patient with good participation and pleasantly agreeable to therapy this date. Noted successful ambulatory trial in [facility] with RW and respecting RLE WBAT in tall [facility] boot only, per ortho request from previous ankle fracture (s/p fall) in September. Patient will continue to benefit from skilled PT during this admit to address BLE strength and endurance deficits, and maximize independence with functional mobility.    Rehab Prognosis: Good; patient would benefit from acute skilled PT services to address these deficits and reach maximum level of function.    Recent Surgery: * No surgery found *      Plan:     During this hospitalization, patient to be seen 4 x/week to address the identified rehab impairments via gait training, therapeutic activities, therapeutic exercises, neuromuscular re-education and progress toward the following goals:    Plan of Care Expires:  12/18/24    Subjective     Chief Complaint: "I'm so happy we got to take a walk"  Patient/Family Comments/goals: return home with safety  Pain/Comfort:  Pain Rating 1: 0/10  Pain Rating Post-Intervention 1: 0/10    Patients cultural, spiritual, Hindu conflicts given the current situation: no    Living Environment:  Patient lives in a [facility] with [facility].   Prior to admission, patients level of function was independence. Note, patient had not been " wearing CAM boot or ambulating with RW at home. Equipment used at home: walker, rolling, bath bench, cane, quad.  DME owned (not currently used): rolling walker, transfer tub bench, and cane .  Upon discharge, patient will have assistance from daughter, intermittently.    Objective:     Communicated with RN prior to session.  Patient found HOB elevated with telemetry  upon PT entry to room.    General Precautions: Standard, hearing impaired, fall, seizure  Orthopedic Precautions:RLE weight bearing as tolerated   Braces:  (Tall CAM boot)  Respiratory Status: Room air    Exams:  Cognitive Exam:  Patient is oriented to Person, Place, Time, and Situation  Gross Motor Coordination:  WFL  Postural Exam:  Patient presented with the following abnormalities:    -       Rounded shoulders  -       Forward head  RLE ROM: WFL  RLE Strength: WFL  LLE ROM: WFL  LLE Strength: WFL    Functional Mobility:  Bed Mobility:     Supine to Sit: supervision  Sit to Supine: supervision  Transfers:     Sit to Stand:  stand by assistance with rolling walker and from EOB  Gait: ~400 ft with RW + SBA, mildly inconsistent foot placement, mild difficulty with RLE advancement d/t increased weight of tall CAM boot, no LOB, 3 standing rest breaks, cues for appropriate proximity to RW  Balance:   Sitting: good  Standing: SBA + BUE support on RW      AM-PAC 6 CLICK MOBILITY  Total Score:20       Treatment & Education:  Patient educated on role of PT in acute care, PT POC, and PT goals.  Patient educated on importance of OOB activity to promote overall endurance.  All items placed within reach, and notified on call light usage for assistance with any needs for fall prevention.  Patient educated on calling for assistance for any needs to improve overall safety awareness.    Patient left HOB elevated with all lines intact, call button in reach, bed alarm on, and RN notified.    GOALS:   Multidisciplinary Problems       Physical Therapy Goals           "Problem: Physical Therapy    Goal Priority Disciplines Outcome Interventions   Physical Therapy Goal     PT, PT/OT Progressing    Description: Goals to be met by: 24     Patient will increase functional independence with mobility by performin. Supine to sit with Modified Coleman  2. Sit to supine with Modified Coleman  3. Sit to stand transfer with Modified Coleman  4. Bed to chair transfer with Modified Coleman using Rolling Walker  5. Gait  x 750 feet with Modified Coleman using Rolling Walker.   6. Ascend/Descend 4 inch curb step with Modified Coleman using RW  7. Lower extremity exercise program x7 reps per handout, with assistance as needed                         History:     Past Medical History:   Diagnosis Date    A-fib     Alcohol abuse     Anxiety     Arthritis     Chronic gout     Diabetes mellitus     DM (diabetes mellitus) 2016    "boarderline, not taking any meds currently"    Fatty liver     Hyperlipidemia     Renal cell cancer     S/P TKR (total knee replacement), right 2019       Past Surgical History:   Procedure Laterality Date    ABSCESS DRAINAGE      perirectal    COLONOSCOPY      ENDOSCOPIC ULTRASOUND OF UPPER GASTROINTESTINAL TRACT N/A 2024    Procedure: ULTRASOUND, UPPER GI TRACT, ENDOSCOPIC;  Surgeon: Mode Wood MD;  Location: [facility];  Service: Endoscopy;  Laterality: N/A;   portal-EUS in 7-10 days please, [facility] [place] [facility]-eliquis approved  Te -tt  6/10-precall complete-MS    HAND SURGERY Left     JOINT REPLACEMENT      knee replacement right knee    KIDNEY SURGERY      partial left kidney removal - [place]    PARTIAL NEPHRECTOMY Left 2014    PERCUTANEOUS CRYOTHERAPY OF PERIPHERAL NERVE USING LIQUID NITROUS OXIDE IN CLOSED NEEDLE DEVICE Right 2019    Procedure: CRYOTHERAPY, NERVE, PERIPHERAL, PERCUTANEOUS, USING LIQUID NITROUS OXIDE IN CLOSED NEEDLE DEVICE-right knee iovera;  Surgeon: Donny SCHROEDER " Reginaldo ALBA MD;  Location: [facility] CATH LAB;  Service: Pain Management;  Laterality: Right;    TOTAL KNEE ARTHROPLASTY Right 6/27/2019    Procedure: ARTHROPLASTY, KNEE, TOTAL-SAME DAY;  Surgeon: Mikael Huerta MD;  Location: [facility] OR [address] [place] [address];  Service: Orthopedics;  Laterality: Right;       Time Tracking:     PT Received On: 11/18/24  PT Start Time: 1614     PT Stop Time: 1638  PT Total Time (min): 24 min     Billable Minutes: Evaluation 12 and Gait Training 12      11/18/2024

## 2024-11-18 NOTE — CONSULTS
"  [facility]   DEPARTMENT OF PSYCHIATRY     IDENTIFIERS & DEMOGRAPHICS:     ENCOUNTER: initial    -- PATIENT IDENTIFIERS: Donald Warren Abadie  [id number]  1955  69 y.o.  male  -- LOCATION OF PATIENT: unit (med/surg)    -- MODE OF ARRIVAL: self-presented  -- PRESENT WITH PATIENT DURING SESSION: ALONE  -- SOURCES OF INFORMATION: PATIENT  -- LOCATION OF ENCOUNTER PROVIDER: [place], [place]    -- ENCOUNTER PROVIDER: Inder Palomo MD        PRESENTATION:     CHIEF COMPLAINT(S): Alcohol withdrawals    OVERVIEW OF THE HPI:    69 year old male with hx of alcohol use disorder, AFib on Eliquis, DM, GERD, Gout, BPH, HLD, and anxiety who presented to hospital for concerns of alcohol withdrawals. Daughter is a nurse and found him at home in possible Afib and short of breath. Psychiatry was consulted for Alcohol use disorder. Admitted with alcohol withdrawal. Interested in quitting." Patient endorses drinking 4-10 beers per day; has completed rehab in the past with longest period of sobriety 5 years. Utilizes alcohol to cope with exacerbations of anxiety. Started on benzo taper by primary prior to consult.69 year old male with hx of alcohol use disorder, AFib on Eliquis, DM, GERD, Gout, BPH, HLD, and anxiety who presented to hospital for concerns of alcohol withdrawals. Daughter is a nurse and found him at home in possible Afib and short of breath. Psychiatry was consulted for Alcohol use disorder. Admitted with alcohol withdrawal. Interested in quitting." Patient endorses drinking 4-10 beers per day; has completed rehab in the past with longest period of sobriety 5 years. Utilizes alcohol to cope with exacerbations of anxiety. Started on benzodiazepine taper by primary team prior to addiction consult.    Exam complicated by patient being hard of hearing without hearing aids in; frequently required repetition of questions or would respond inappropriately. Endorses recent relapse with alcohol use. Appearing " disoriented at times, required prompting by notewriter to review events leading to hospitalization to which he confirmed. States he drinks anywhere between 4-5 versus 10 bud light beers per day. Last drink was prior to hospitalization, unknown time. Has been drinking since he was 14 yo, states his family were all heavy users of alcohol and have since passed away. Had 5 year period of sobriety after attending rehab, expresses willingness to want to engage in this again. Relapse occurred due to anxiety; endorses generalized anxiety with no specific triggers. 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 Has found AA meeting helpful in the past, but stopped going when he relapsed. Enjoys drinking alcohol with his friends, which he says he does frequently. Endorses intermittent consumption of marijuana.     SUBJECTIVE/CURRENT FINDINGS:    Concerns for disorientation, able to state name, place, and year. Required prompting by notewriter to discuss events leading to hospitalization. Often easily distracted on interview in addition to being hard-of-hearing. Seemingly superficially linear with some responses, unable to elaborate at times. Denies SI/HI/AVH. Endorses improvement to and no current alcohol withdrawal symptoms since starting benzodiazepine taper.    CURRENT EPISODE:  -- EPISODE STATUS: recurrent    -- ONSET: gradual  -- RATE (frequency of symptoms): regular/routine (3/5)  -- COURSE: steady/consistent  -- SEVERITY: moderate-severe (4/5)  -- ASSOCIATED SIGNS/SYMPTOMS: psychiatric    REVIEW OF SYSTEMS:    >> SOURCES: patient     N   Sleep Disturbance/Disruption  no insomnia     N   Appetite/Weight Change   N   Alterations in Energy Level   Y   Impaired Focus/Concentration  confusion on initial ED presentation   Y   Depressive Symptomatology  +anhedonia, +amotivation    intermittent sadness   Y   Excessive  Anxiety/Worry  +generalized anxiety/worry, +panic attacks    excessive generalized anxiety and panic attacks   N   Dysregulated Mood/Behavior   N   Manic Symptomatology   N   Psychosis   N   Trauma-Related   N   Impulsivity/Compulsivity/Obsessionality   N   Disordered Eating   N   Dissociation   N   Pain   Y   Cardiopulmonary Symptoms  +tachycardia, +shortness of breath    related to AFib episode   N   Abnormal Involuntary Movements    PSYCH  Pertinent Positives/Negatives:    As stated in HPI    Regarding the current presentation, no other significant issues or complaints are voiced or known at this time.       ADD-ON PSYCHOTHERAPY:     N/A         HISTORY:     >> SOURCES: patient, chart review  obtained per chart review and updated as appropriate       PSYCH  SUBSTANCE  FAMILY  SOCIAL  MEDICAL     Y   Previous/Pre-Existing Psychiatric Diagnoses  DOLORES   Y   Past Psychotropic Trials  Sertraline   Y   Current Psychiatric Provider  Dr. Larry   Y   Hx of Outpatient Psychiatric Treatment   Y   Hx of Psychiatric Hospitalization  Previously seen by [facility] 08/2024   N   Hx of Suicidal Ideation/Threats   N   Hx of Suicide Attempts/Gestures   N   Hx of Homicidal Ideation/Threats   N   Hx of Homicidal Behavior   N   Hx of Non-Suicidal Self-Injurious Behavior   Y   Hx of Perpetrated Violence   N   Documented Hx of Malingering   N   Hx of Psychosis   N   Hx of Bipolar Diathesis   Y   Hx of Depression   Y   Hx of Anxiety   N   Hx of Insomnia   Y   Hx of Delirium  Previous history of delirium tremens     Y   Hx of Formal [place] Treatment  Previously engaged in rehab, IOP, AA   Y   Recent Alcohol Consumption  +SASQ   +   Drinking Pattern (frequency)  +daily   +   Drinking Pattern (amount)  +heavy drinking   N   Hx of Nicotine Use   Y   Hx of Alcohol Misuse/Abuse   Y   " Hx of Illicit Drug Misuse/Abuse  Marijuana   N   Hx of Prescription Drug Misuse/Abuse   +   Drug Experimentation/Usage  +cannabis       N   Family Psychiatric History   +   Family Hx of Suicide  no      N   Hx of Trauma   N   Hx of Abuse     N   Developmental Delay/Disability   Y   GED/High School Diploma   Y   Post-Secondary Education   N   Currently Employed   N   Currently on Disability   Y   Financially Stable   Y   Functions Independently   U   Domiciled   Y   Intact Support System   Y   Heterosexual/Cisgender   Y   Currently in a Relationship   Y   Ever    U   Ever /   Y   Children/Dependents   U   Zoroastrianism/Spiritual   U   Hobbies/Recreational Activities   U   Hx of  Service     N   Ever Charged/Convicted   N   Current Probation/[place]/Diversion   N   Hx of Incarceration     N   Hx of Seizures   Y   Hx of Head Trauma  recent falls at home     Y   Medical Hx & Diagnoses  AFib   N   Allergies    >> SCHEDULED AND PRN MEDS: reviewed/reconciled  see MEDCARD      Allergies:  No known drug allergies     EXAMINATION:     VITALS:  /64   Pulse 61   Temp 97.6 °F (36.4 °C) (Oral)   Resp 17   Ht 5' (1.524 m)   Wt 77 kg (169 lb 12.1 oz)   SpO2 100%   BMI 33.15 kg/m²     MENTAL STATUS EXAMINATION:  Appearance: appears stated age, normal weight  appropriately dressed, adequately groomed    Behavior & Attitude: participative, able to redirect  calm    superficially linear  Movements & Motor Activity: no psychomotor agitation, no weakness, no tremor    Speech & Language: normal rate, normal volume, spontaneous, slurring    hard-of-hearing  Mood: "good"  Affect: euthymic, reactive    Thought Process & Associations: relevant, tangential  disorganized    superficially linear  Thought Content & Perceptions: no delusions, no paranoid ideation, no " grandiosity, no hallucinations    Sensorium: awake, confused    Orientation: oriented to person, oriented to time, not oriented to current location  not oriented to place    Recent & Remote Memory: intact (remote)  impaired (recent)    Attention & Concentration: easily distracted    Fund of Knowledge: intact    Insight: limited    Judgment: limited            RISK & REGULATORY:      RISK PARAMETERS (current to the encounter/episode  NOT inclusive of past history):     N   Suicidal Ideation/Threats   N   Suicide Attempts/Gestures   N   Homicidal Ideation/Threats   N   Homicidal Behavior   N   Non-Suicidal Self-Injurious Behavior   N   Perpetrated Violence     FIREARMS & WEAPONS:     Y   Ready Access to Firearms   +   Further Considerations  gun in safe     SAFETY SCREENINGS:    -- PROTECTIVE FACTORS: IDENTIFIED       - SPECIFIC FACTORS IDENTIFIED: accessible support, loving attachments, purpose/meaning, social supports, therapeutic alliance    -- RISK FACTORS: IDENTIFIED     - SPECIFIC MODIFIABLE FACTORS IDENTIFIED: substance abuse, intoxication/use     - SPECIFIC NON-MODIFIABLE FACTORS IDENTIFIED: age 59+ years    -- FUTURE ORIENTED: YES  -- REMORSE/REGRET: YES     REGULATORY:      INFORMED CONSENT & SHARED DECISION MAKING are the hallmark and bedrock of good clinical care, and as such have been employed and obtained, respectively, to the degree possible.  Discussed, to the extent possible, diagnosis, risks and benefits of proposed treatment (e.g., medication, therapy) vs alternative treatments vs no treatment, potential side effects of these treatments, and the inherent unpredictability of treatment.        WARNINGS & PRECAUTIONS:  >> In cases of emergencies (e.g. SI/HI resulting in danger to self or others, functioning deteriorating to the level of grave disability), call 911 or [phone], or present to the emergency department for immediate assistance.    >> Individuals  should not operate a motor vehicle or heavy machinery if effects of medications or underlying symptoms/condition impair the ability to do so safely.    >> FULLY comply with ANY/ALL medication as prescribed/instructed and report ANY/ALL suspected adverse effects to appropriate health care providers.       ASSESSMENT & PLAN:     DIAGNOSES & PROBLEMS:       1.  Alcohol use disorder, severe    2.  R/o major neurocognitive disorder    PSYCHOTROPIC REGIMEN:   (C)=Continue as prescribed  (A)=Adjust as noted  (I)=Iniitate  (D)=Discontinue      1.  Benzodiazepine taper (C)    2.  Valium 10mg TID followed by    3.  Valium 10mg BID followed by    4.  Valium 5mg TID followed by    5.  Valium 5mg BID    Advance taper as tolerated; though it is equally as important to be prepared to increase doses of benzodiazepines in the short run if withdrawal symptoms worsen of fail to adequately improve    -- ASSESSMENT (synthesis  analysis):     69 year old male with hx of alcohol use disorder, AFib on Eliquis, DM, GERD, Gout, BPH, HLD, and anxiety who presented to hospital for concerns of alcohol withdrawals. Daughter is a nurse and found him at home in possible Afib and short of breath. Psychiatry was consulted for Alcohol use disorder. Admitted with alcohol withdrawal. Interested in quitting. Started on benzodiazepine taper for alcohol withdrawals prior to addiction consult, had received Valium 20mg BID on day 1. Recommend continuing benzodiazepine taper as listed above and monitoring with CIWA precautions until resolution of symptoms. Will need to reassess patient for improvement to mentation, previously mentions willingness to seek detox/rehab.    -- PLAN (goals  recommentations):     - CURRENT CONDITION: exacerbated  as compared to typical baseline  - WITH REASONABLE MEDICAL CERTAINTY, based on history, chart review, available collateral information, and a present-state examination:   -- psychiatric hospitalization is not  indicated    * PEC is NOT INDICATED - rescind if in place     >> contingent on accessibility and willingness, patient would benefit from the following referrals/services: outpatient psychiatric services, psychotherapy/counseling, addiction rehab program, mutual self-help groups (e.g. Alcoholics Anonymous, [facility] Recovery), and psychiatric intensive outpatient or partial hospital program (IOP/PHP)  >> active alcohol (or sedative-hypnotic) withdrawal is present, including s/sx of complicated withdrawal  >> continue alcohol (or sedative-hypnotic) withdrawal protocol  >> case discussed with staff psychiatrist  >> will continue to follow with you, thank you     DELIRIUM PROTOCOL      > DELIRIUM is a HIGH RISK MEDICAL CONDITION with significantly elevated rates of morbidity and mortality, routinely rendering a person temporarily incapacitated and interfering with the effective management of underlying medical conditions. As such, proactive and comprehensive management is essential. Implementation of the following recommendations should be considered BEST PRACTICE:  >> if feasible, please utilize non-pharmacologic approaches FIRST for agitation; PRN medications can be considered if this approach is insufficient or ineffective  >> AVOID the use of PHYSICAL RESTRAINTS whenever possible; whenever not, always seek to MINIMIZE their use  >> keep window shades open and room lit during day and room dim at night in order to promote normal sleep-wake cycles  >> reduce unnecessary stimulation/noise; TV screen and sound should be off unless patient is actively engaged with the program  >> minimize disruptions at night  >> correct sensory deficits, when present, with provision of eyeglasses and/or hearing aids  >> optimize nutrition and hydration status  >> continue to manage medical conditions and assess for and treat pain  >> consider PT/OT consult, as early mobility and exercise have been shown to decrease duration of delirium  >>  attempt to maintain consistency of key staff who will routinely interact with the patient  >> encourage family at bedside  >> orient patient often to date, location, and situation, including reason for hospitalization and current plan of care  >> keep whiteboard (or similar) in patient's room current with the date and names of key treatment team members  >> utilize 1:1 sitter for monitoring, if warranted  >> LIMIT or DISCONTINUE the use of narcotics, benzodiazepines, anticholinergics, antihistaminergics, steroids, and other known deliriogenic medications  >> continue treatment of the underlying causative etiology of delirium, the correction of which is the ultimate objective in delirium management; if unknown, continue exhaustive medical workup to elucidate the source(s)     ALCOHOL (OR SEDATIVE-HYPNOTIC) WITHDRAWAL PROTOCOL     >> recommendations provided herein should be seen as rough guidelines, as an alcohol/benzodiazepine withdrawal protocol should be individualized and modified routinely, as clinically warranted  >> the absence of a positive serum or urine toxicology for alcohol does not exclude recent heavy prolonged drinking, as there may have been a delay in the patient presenting; a withdrawal protocol may still be warranted, based on the clinical situation  >> in the absence of specified contraindications, err on the side of giving more than less benzodiazepine; diazepam is favored for its long half-life, while lorazepam is typically indicated when hepatic metabolism is of utmost concern  >> provide supportive care (fluid and electrolyte replacement; caloric support; vitamin supplementation/repletion)  >> parenteral thiamine is preferred (at least initially), and should be given prior to glucose, to prevent/treat Wernicke-Korsakoff Syndrome  >> vital signs should be frequently assessed and factored into management protocols, taking into account comorbidities and baseline cardiovascular status  >>  prototypical threshold levels that indicate significant alcohol/benzodiazepine withdrawal and the need for additional doses of benzodiazepines include systolic BP>160, diastolic BP >100 or HR >110  >> frequent assessment with CIWA-Ar is the standard of care and the hallmark of excellent clinical practice  >> a score of 8+ on CIWA-Ar typically warrants administration of benzodiazepines  >> frequent need for administration of a benzodiazepine or CIWA-Ar scores >15 should trigger possible reclassification of the patient as at higher risk for complicated withdrawal, with concomitant adjustment of the detox protocol  >> if withdrawal does not progress over the first 24-48 hours, CIWA-Ar can be administered less frequently - an interval of four to six hours is reasonable   >> patients with a history of seizures, delirium tremens (or withdrawal delirium), or prolonged heavy alcohol (or sedative-hypnotic) consumption, who are minimally symptomatic or asymptomatic and are admitted to the hospital for other reasons, should typically be prophylactically treated with STANDING benzodiazepines  >> if a fixed-dose regimen is utilized, patients should still be reassessed frequently, and additional doses of medication given if a score of 8+ is achieved on the CIWA-Ar  >> if such elevated CIWA-Ar scores are reached routinely, prophylaxis is not adequate, and the regimen should be adjusted accordingly  >> if delirium ensues, rule out and treat potential alternative etiologies, even if delirium tremens is likely   >> identify and correct metabolic derangements and volume deficits, and determine if ICU monitoring is warranted  >> in complicated withdrawal, benzodiazepines should be given via IV route whenever possible to assure proper absorption  >> titrate benzodiazepines to effect (ideally light sedation) which may entail the use of massive doses of medication  >> if the patient is incapacitated and the CIWA-Ar cannot be used, instead  utilize the [place] Agitation-Sedation Scale (RASS) with a goal of 0 to -2  >> in refractory delirium tremens (or withdrawal delirium), phenobarbital or propofol can be used; these agents are preferred to dexmedetomidine as they act directly on the ALFREDO and NMDA receptors, which underlie the derangements seen in alcohol (or sedative-hypnotic) withdrawal  >> intubation may be required in refractory DTs, especially with the use of propofol  >> post-withdrawal treatment is paramount; medically supervised withdrawal manages the patient through the withdrawal symptoms but does not treat alcohol (or sedative-hypnotic) use disorders; patients completing withdrawal are at high risk of relapse to resumed alcohol (or sedative-hypnotic) consumption/use  >> if in a facility, patients should be given explicit plans for follow-up care PRIOR to discharge  >> follow-up may involve ongoing treatment through primary care or a specialized addiction treatment program or physician, and is dependent on a number of factors, including availability and patient willingness  >> continuing care, including pharmacotherapy (e.g., MAT) and psychosocial intervention for alcohol (or sedative-hypnotic) use disorders, are indicated      CHART REVIEW: available documentation has been reviewed, and pertinent elements of the chart have been incorporated into this evaluation where appropriate.    Inder Palomo MD  [facility] PGY-2   [facility]     DIAGNOSTIC TESTING:      Glu 127 (H)  11/17/2024  Li *   *  TSH 2.089  10/29/2023    HgA1c 5.3  11/15/2024  VPA *   *   FT4 0.84  10/29/2023    Na 135 (L)  11/17/2024  CLZ *   *  WBC 5.62  11/17/2024    Cr 1.0  11/17/2024  ANC 4.0; 71.6;   11/17/2024   Hgb 13.8 (L)  11/17/2024     BUN 6 (L)  11/17/2024  Trop I 0.006  11/14/2024  HCT 40.3  11/17/2024     GFR >60.0  11/17/2024     11/15/2024   (L)  11/17/2024     Alb 3.8  11/17/2024   PRL *   *   B12 484  10/3/2024     T Bili 1.0  11/17/2024  Chol 123  11/11/2022  B9 *   *    ALP 68  11/17/2024  TGs 213 (H)  11/11/2022  B1 92  *    AST 33  11/17/2024  HDL 43  11/11/2022  Vit D *   *     ALT 21  11/17/2024  LDL 37.4 (L)  11/11/2022  HIV Non-reactive  6/21/2023     INR 1.1  9/20/2024  [place] *   *   Hep C Non-reactive  6/21/2023    GGT *   *  Lip 27  11/14/2024  RPR *   *    MCV 96  11/17/2024   NH4 34  8/4/2024  UPT *   *      PETH *   *  THC Presumptive Positive (A)  6/21/2023    ETOH <10  11/14/2024  JO-ANN Negative  6/21/2023    EtG *   *  AMP Negative  6/21/2023    ALC 68 (A)  11/29/2022  OPI Negative  6/21/2023    BZO Presumptive Positive (A)  6/21/2023  MTD Negative  6/21/2023     BAR Negative  6/21/2023  BUP Not Detected  9/26/2024    PCP Negative  6/21/2023  FEN Not Detected  9/26/2024     Results for orders placed or performed during the hospital encounter of 11/14/24   EKG 12-lead    Collection Time: 11/14/24 11:55 AM   Result Value Ref Range    QRS Duration 82 ms    [facility] QTC Calculation 430 ms    Narrative    Test Reason :    Vent. Rate : 100 BPM     Atrial Rate : 107 BPM     P-R Int :    ms          QRS Dur :  82 ms      QT Int : 334 ms       P-R-T Axes :     -4  -6 degrees    QTcB Int : 430 ms    Baseline Artifact  Atrial fibrillation  Abnormal ECG  When compared with ECG of 14-Nov-2024 11:54,  No significant change was found  Confirmed by Quique Hernandez ([id number]) on 11/14/2024 2:10:16 PM    Referred By: AAAREFERRAL SELF           Confirmed By: Quique Hernandez        WEIR & LINKS:        Y  = yes/endorses     N  = no/denies     U  = unknown/unable to assess    ADHD   AIMS   AUDIT   AUDIT-C   C-SSRS (Screen)   C-SSRS (Short)   C-SSRS (Full)   DAST   DAST-10   DOLORES-7   MoCA   PCL-5   PHQ-9   [place]   YM[facility]     Inpatient consult to Psychiatry  Consult performed by: Inder Palomo MD  Consult ordered by: Mehdi Ramey MD        [facility]  MEDICAL TELEMETRY CHRISTY*

## 2024-11-18 NOTE — PLAN OF CARE
Problem: Physical Therapy  Goal: Physical Therapy Goal  Description: Goals to be met by: 24     Patient will increase functional independence with mobility by performin. Supine to sit with Modified [place]  2. Sit to supine with Modified [place]  3. Sit to stand transfer with Modified [place]  4. Bed to chair transfer with Modified [place] using Rolling Walker  5. Gait  x 750 feet with Modified [place] using Rolling Walker.   6. Ascend/Descend 4 inch curb step with Modified [place] using RW  7. Lower extremity exercise program x7 reps per handout, with assistance as needed    PT Evaluation completed 2024   PT goals and POC established.     Outcome: Progressing

## 2024-11-18 NOTE — PT/OT/SLP EVAL
Occupational Therapy   Evaluation    Name: Donald Warren Abadie  MRN: [id number]  Admitting Diagnosis: Alcohol withdrawal syndrome with complication  Recent Surgery: * No surgery found *      Recommendations:     Discharge Recommendations: Low Intensity Therapy  Discharge Equipment Recommendations:  none  Barriers to discharge:  Decreased caregiver support    Assessment:     Donald Warren Abadie is a 69 y.o. male with a medical diagnosis of Alcohol withdrawal syndrome with complication.  He presents with decreased independence with ADL's. Performance deficits affecting function: weakness, impaired endurance, impaired self care skills, impaired functional mobility, impaired balance, decreased lower extremity function, decreased upper extremity function.      Rehab Prognosis: Good; patient would benefit from acute skilled OT services to address these deficits and reach maximum level of function.       Plan:     Patient to be seen 3 x/week to address the above listed problems via self-care/home management, therapeutic activities, therapeutic exercises  Plan of Care Expires: 12/18/24  Plan of Care Reviewed with: patient, daughter    Subjective     Chief Complaint: none stated  Patient/Family Comments/goals: Pt's daughter reported that pt has a CAM boot at home that he didn't use before admit.    Occupational Profile:  Living Environment: Pt resides alone in 1 story house with 1 step to enter.  Pt was independent with ADL's, IADL's and ambulation.  Pt did not use AD to ambulate.  Pt normally drives however daughter took his car keys ~ 2 weeks ago.  Pt is retired from being a  at [employer].  Pt enjoys fishing, trawling, hunting & race cars. Pt has a bathtub for bathing.  Equipment Used at Home: walker, rolling, bath bench, cane, quad (hand held shower hose)  Assistance upon Discharge: unknown    Pain/Comfort:  Pain Rating 1: 0/10  Pain Rating Post-Intervention 1: 0/10    Patients cultural,  spiritual, Protestant conflicts given the current situation: no    Objective:     Communicated with: RN prior to session.  Patient found up in chair with telemetry (daughter & visitor present during session) upon OT entry to room.    General Precautions: Standard, fall, hearing impaired, seizure  Orthopedic Precautions: RLE weight bearing as tolerated  Braces:  (CAM boot)  Respiratory Status: Room air    Occupational Performance:    Functional Mobility/Transfers:  Patient completed Sit <> Stand Transfer with stand by assistance  with  rolling walker from chair  Functional Mobility: SBA-CGA with functional mobility to bathroom & back during ADL's using RW    Activities of Daily Living:  Grooming: stand by assistance with washing face standing at sink using RW  Upper Body Dressing: minimum assistance donnign gown around back while standing  Lower Body Dressing: stand by assistance donning sock & shoe to LLE while seated in chair    Cognitive/Visual Perceptual:  Cognitive/Psychosocial Skills:     -       Oriented to: Person, Place, Time, and Situation   -       Follows Commands/attention:Follows two-step commands  -       Safety awareness/insight to disability: impaired     Physical Exam:  Sensation:    -       Intact  Dominant hand:    -       right  Upper Extremity Range of Motion:  BUE WFL except ~5* right shoulder flexion  Upper Extremity Strength: BUE WFL except 2-/5 right shoulder flexion   Strength: BUE WFL  Fine Motor Coordination: mildly decreased in RUE    AMPAC 6 Click ADL:  AMPAC Total Score: 19    Treatment & Education:  Provided education on safety & need for (A) with OOB activities.  Provided education regarding role of OT, POC, & discharge recommendations with pt & daughter verbalizing understanding.  Pt had no further questions & when asked whether there were any concerns pt reported none.      Patient left up in chair with all lines intact, call button in reach, RN notified, and daughter  "present    GOALS:   Multidisciplinary Problems       Occupational Therapy Goals          Problem: Occupational Therapy    Goal Priority Disciplines Outcome Interventions   Occupational Therapy Goal     OT, PT/OT Progressing    Description: Goals to be met by: 12/18/24     Patient will increase functional independence with ADLs by performing:    UE Dressing with Modified [place].  LE Dressing with Modified [place].  Grooming while standing with Modified [place].  Toileting from toilet with Modified [place] for hygiene and clothing management.   Supine to sit with Modified [place].  Step transfer with Modified [place]  Toilet transfer to toilet with Modified [place].                         History:     Past Medical History:   Diagnosis Date    A-fib     Alcohol abuse     Anxiety     Arthritis     Chronic gout     Diabetes mellitus     DM (diabetes mellitus) 11/16/2016    "boarderline, not taking any meds currently"    Fatty liver     Hyperlipidemia     Renal cell cancer 2014    S/P TKR (total knee replacement), right 06/27/2019         Past Surgical History:   Procedure Laterality Date    ABSCESS DRAINAGE      perirectal    COLONOSCOPY      ENDOSCOPIC ULTRASOUND OF UPPER GASTROINTESTINAL TRACT N/A 6/11/2024    Procedure: ULTRASOUND, UPPER GI TRACT, ENDOSCOPIC;  Surgeon: Mode Wood MD;  Location: [facility];  Service: Endoscopy;  Laterality: N/A;  6/7 portal-EUS in 7-10 days please, [facility] or Nicole-eliquis approved  Te 6/7-tt  6/10-precall complete-MS    HAND SURGERY Left     JOINT REPLACEMENT      knee replacement right knee    KIDNEY SURGERY      partial left kidney removal - [place]    PARTIAL NEPHRECTOMY Left August 2014    PERCUTANEOUS CRYOTHERAPY OF PERIPHERAL NERVE USING LIQUID NITROUS OXIDE IN CLOSED NEEDLE DEVICE Right 6/17/2019    Procedure: CRYOTHERAPY, NERVE, PERIPHERAL, PERCUTANEOUS, USING LIQUID NITROUS OXIDE IN CLOSED NEEDLE DEVICE-right knee iovera;  Surgeon: " Donny Hair III, MD;  Location: [facility] CATH LAB;  Service: Pain Management;  Laterality: Right;    TOTAL KNEE ARTHROPLASTY Right 6/27/2019    Procedure: ARTHROPLASTY, KNEE, TOTAL-SAME DAY;  Surgeon: Mikael Huerta MD;  Location: [facility] OR [address] [place] [address];  Service: Orthopedics;  Laterality: Right;       Time Tracking:     OT Date of Treatment: 11/18/24  OT Start Time: 1122  OT Stop Time: 1147  OT Total Time (min): 25 min    Billable Minutes:Evaluation 15  Therapeutic Activity 10    11/18/2024

## 2024-11-18 NOTE — SIGNIFICANT EVENT
Notified by nursing tonight that patient with worsening agitation potentially combative w/ nursing staff    Code white called x2 tonight    After 1st instance - pt given ativan/haldol and wrist restraints but later was able to get out of restraints and out of the room     2md code white called and pt is in wrist restraints in bed, he is oriented to the hospital but little else regarding situation/time.  He does not participate in interview much to ascertain his current thoughts/beliefs.     Plan  -continue chemical/physical restraints   -pt may need scheduled medication nightly to help avoid agitation.         Garrick Kramer M.D.  Attending Physician  [facility] Medicine Dept.  Pager: [phone]  [facility] -x [phone]

## 2024-11-18 NOTE — ASSESSMENT & PLAN NOTE
Patient's FSGs are controlled on current hypoglycemics.   Last A1c reviewed-   Lab Results   Component Value Date    HGBA1C 5.3 11/15/2024    HGBA1C 5.9 (H) 02/29/2024    HGBA1C 8.0 (H) 10/29/2023     Will hold PO hypoglycemics and will start correctional scale insulin  Most recent fingerstick glucose reviewed-   Recent Labs   Lab 11/17/24  1703 11/17/24  1845 11/17/24  2128 11/18/24  0816   POCTGLUCOSE 52* 115* 154* 113*       currently on   Antihyperglycemics (From admission, onward)    Start     Stop Route Frequency Ordered    11/14/24 1554  insulin aspart U-100 pen 0-5 Units         -- SubQ Before meals & nightly PRN 11/14/24 4396

## 2024-11-18 NOTE — PATIENT INSTRUCTIONS
REFERRAL RECOMMENDATIONS FOR SUBSTANCE ABUSE & MENTAL HEALTH      IN CASE OF SUICIDAL THINKING, call the National Suicide Hotline Number: 988    988 Suicide & Crisis Lifeline: 989 , 0-235-536-RRWL (9873)  https://988OrderWithMe.[employer]       SUBSTANCE ABUSE:     [facility] (formerly known as the ABU)  [x] [phone], Option 2  [x] [facility] [facility] 4th [facility], [place] [address]  [x] [email or web address]://www.[email or web address].org/services/[email or web address]-recovery-program  [x] The [facility] delivers comprehensive and collaborative treatment for alcohol and substance use disorders.  Excellent program for working professionals or anyone else seeking recovery.  [x] Requires insurance approval prior to starting program, call number above for more information.  [x] Intensive Outpatient Rehabilitation Program - M-F 9am-3pm - daily groups with psychologists and social workers, sessions with MDs 3x per week   [x] Ambulatory detox and dual diagnosis available      SUBOXONE:     NOTE: some Suboxone clinics require their clients to participate in a structured program (such as an IOP) in order to be prescribed Suboxone.  Some clinics have a long waiting list.  Most of these clinics do not accept walk-in clients, so call first to to learn what must be done to get started on Suboxone.    [facility] - [phone] (can do Sublocade)  [address] [facility], [place] [address]    [address]  [phone]    [facility] - [phone] (can do Sublocade)  [address] [place] [address]    [facility]  [address] [place]  [phone]     [facility] (very short waiting list, may accept some walk-in's but call first if possible)  [address], [place] [address]  [phone]; [phone]    [facility]   [address] [place]    [phone]    [facility] (can usually be seen within a week but is cash only  for appointment)  [address] [facility] ([facility])  [address] [place], [place]  [phone]    [facility])  [address] [facility] [address]  [phone]    [place], [place]:    [facility] - [address] [place] [address] - Tel: [phone]    Ramon Guy - [address] [place] [address] - Tel: [phone]    Rafita Parker - [address] [place] [address] - Tel: [phone]    Mode Foster - [address] [place] [address] - Tel: [phone]    Rdoerick Resendez - [address] [place] [address] - Tel: [phone]    [place], [place]:     Dr. Catia Mackenzie and Dr. Jamel Colon - [address] [place], [place] - Tel: [phone]    Dr. Abida Bray - [address] [facility] [place], [place] - Tel: [phone]    Dr. Anup Roe - Tel: [phone]    Dr. Carlos Calvillo - [facility] - [phone]      METHADONE:     [facility] (the only methadone clinic in the [facility], has two locations)  [x] [place] - [address] [place] [address], [phone]  [x] [facility] [place] [address], [phone]      12 STEP PROGRAMS (and similar):     Alcoholics Anonymous (local)  [x] [phone]  [x] www.[place].org for schedules for in-person and online meetings  [x] There are AA meetings throughout the day all over [facility]  [x] AA costs nothing to attend; they pass a basket for donations but this is not required    Narcotics Anonymous  [x] [phone]  [x] www.noana.org  [x] There are NA meetings throughout the day all over town  [x] NA costs nothing to attend; they pass a basket for donations but this is not required    Alcoholics Anonymous Online Intergroup (national)  [x] www.aa-intergroup.org  [x] Good resource for large, nation-wide meetings  [x] Can also attend smaller, local meetings in other cities  [x] Countless meetings all day and all night  [x] AA costs nothing to attend; they pass a basket for donations  but this is not required    Flying Sober - 24/7 zoom meetings for women and coed - sign on anytime, anywhere!  https://flyingScoreloopsober.The Huffington Post/72-1-jamuojvo/    Online Intergroup of AA - 121 Open AA [place] Meeting - 24/7 zoom meetings  https://aa-intergroup.org/meetings/    LOOKING FOR AN ALTERNATIVE TO 12 STEP PROGRAMS - check out:  SMART Recovery: https://www.smartrecovery.org/about-us  [facility] Recovery: https://recoverydharma.org      DETOX UNITS (USUALLY 5-7 DAYS):     [facility]: [address], [place]  [phone], call first to ensure bed availability    [facility]: [address] [facility] [address] [facility]  [phone], Option 1, call first to ensure bed availability    [facility]: [facility] [address], [facility]  [phone] (intake by appointment only)    [facility]: [address], [facility]  [phone]      INTENSIVE OUTPATIENT PROGRAMS:     [facility] (formerly known as the ABU)  [x] [phone], Option 2  [x] [address] [facility] 4th Floor, [facility] [address]  [x] https://www.[facility].org/services/[facility]recovery-program  [x] [facility] delivers comprehensive and collaborative treatment for alcohol and substance use disorders.  Excellent program for working professionals or anyone else seeking recovery.  [x] Requires insurance approval prior to starting program, call number above for more information.  [x] Intensive Outpatient Rehabilitation Program - M-F 9am-3pm - daily groups with psychologists and social workers, sessions with MDs 3x per week   [x] Ambulatory detox and dual diagnosis available    [facility] Int[facility]patient Program  [x] [phone]  [x] [address] [facility] (the clinic not on [facility])  [x] Call number above for more info and to check insurance requirements    [facility]  [address] [place] [address] [phone]    [facility]:  [address] [facility]  [address]?, [place], [place] [address]?, [phone]  [address] [facility]?, [place], [place] [address]?, [phone]    RESIDENTIAL REHABS (USUALLY 28 DAYS):     [facility]: [address], [phone]    [facility]: [address] , [facility]  [phone] (intake by appointment only)    [facility] (men only) [address], [place], [phone]    [facility] (Female only) [address] [place], [phone]    [facility]: [address], [place], men's program [phone], women's program [phone]    [facility]: [address], [place], [phone]    [facility]: [address], [place], [place], [phone]    [facility]: Men only, [phone], [address] [facility] Dallin Sanders [facility]: [address], [place], [place], [phone]    [facility]: [facility] [place], [place],  [phone]  New Location: [address] [place] [address] 100, [place] [address], [phone]    [facility]:   ?[address] [facility]. [address]?[place], [place] [address]?[phone]    [place]: [address] [place] [address], [phone]    [place]: [place], [place], [phone]     [facility]: [place], [phone]    [facility]: [place] Arri[place], [phone]    [facility]: [place], [place], [phone]    [place]: [place], [phone]    [facility]: [address] [facility], [place] [address], [phone]    COMMUNITY ADDICTION CLINICS:     [facility]: [address] [facility], [address] [phone] -or- [address] [place] [address]    [facility]: [address] [facility] [place]  [address]     [facility]: Clinics [phone]; Crisis [phone]    [facility]: [address] [place] [address]    [facility]: [address] [place] [address]    [facility]: [address] [place] [address],    [facility]: 2nd Floor [address] [facility] [place] [address]    [facility]: [address], [place] [address]    [facility], [address]f, [place] [address]    [facility]: [address] [facility], [place] [phone]  (serves youth 16-23 years old)    [facility]: [facility]/[place] [phone]    [facility]: [address] [facility], [place] [phone]    [facility]: [address] [facility] [phone]    [facility]: [address] [place], [place] [address] [facility], [address], [phone]     [facility]: [address] [facility], [phone], [phone]    RESOURCES IN OTHER [facility]:     [facility]: [address] [facility], [phone], [phone]    [facility]: [address] [facility], [address], [phone]    [facility], [address] [place] [address], [phone]    [facility]: [address] [facility], [phone]    [facility], [address] [facility], [phone] ([facility])    [facility], [address] [facility], [phone] ([facility])    [facility], [address], [place], [phone] ([facility], and [facility])    [facility], [address] , [place], [phone] ([facility])    [facility] Hotline [phone], [phone]    [facility], [address] [facility], [facility] [place], [address] [facility], [address], [place] [facility], [address] [facility]  "Leanna [place] [facility], [address] [facility]. [address] [place] [facility], [address], [place]    [facility], 401 [facility], [address]35[facility] [phone]    [facility], [address] [facility] [phone]    [facility], [address] [facility] [phone]    [facility], 7[address] [facility], [phone]      Persian SPEAKING (en español):     Información de la reunión de Alcohólicos Anónimos  [facility], 10:00 am  Habla español  Esta reunión está abierta y cualquiera puede asistir.    Divehi speaking Alcoholics anonymous meetings:  El "Toro [place] AA Skype" es un toro on line de Alcohólicos Anónimos en [facility]. El toro es lalita, gratuito y virtual a través de Skype Audio. El toro funciona mediante jaspal llamada grupal de voz, por lo que no se utiliza la videollamada, ni se pueden sanju las imágenes o rostros de los participantes. Hace chang años y medio abrimos el primer Toro de AA por Skype en Jb, comfort actualmente asisten personas desde [place], [place], [place], [place], [place], [place], [place], [place], [place], [place], [place] y [place], entre otros.    El toro es muy útil para los alcohólicos que residen en lugares donde no se celebran reuniones de AA, o residen en lugares donde las reuniones de AA son un número limitado de días a la semana, o para aquellos compañeros que se hayan de viaje o que, por cualquier motivo, se hayan convalecientes y no pueden desplazarse. Todos los días nos reuniones a las 21:00 (hora española)    Podéis obtener más información sobre el toro y lupe sesiones en la página web [email or web address]      MENTAL HEALTH:     [facility] Outpatient Clinic  [phone]    [facility] - General " Psychiatry Intensive Outpatient Program  [facility] (formerly known as the BMU)  [phone], option 3    [facility]  Department of Psychiatry - Dual Diagnosis Intensive Outpatient Program  [facility] (formerly known as the ABU)  [phone], option 2      COMMUNITY MENTAL HEALTH CENTERS:     [facility]  ([facility], [facility])  Serves [facility].  Serves uninsured patients & those with Medicaid.  Main location: [address] [place] [id number] [phone]  Walk-in's available during regular business hours.  24/7 Crisis Line: [phone]    [facility]  (aka [facility], [facility])  Serves [facility].  Serves uninsured patients, those with Medicaid and some private plans.  Walk-in's available during regular business hours.  Primary care services available as well.  [facility]: [address] [facility] [place] [address];  [phone]  [place]: [address] [place] [address];  [phone]  24/7 Crisis Line: [phone]    [facility]  Serves uninsured patients & those with Medicaid, call for more info.  Primary care, pediatrics, HIV treatment, and dentistry services available as well.  Three locations.  [phone]    [facility]?Corporate Office  Serves patients with Medicaid, Medicare, and private insurance  [address]  [place],?[place] [address] [phone]    [facility]  Serves uninsured on a sliding scale, as well as Medicaid, Medicare, and private plans.  Eight locations around the [facility] area.  [phone]    [facility]  Serves uninsured patients & those with Medicaid, private insurances.  Primary care available as well.  [phone]  [address] [facility] [place]  [address]    [facility]  Serves veterans who were honorably discharged.  [address] [place] [address]  [phone]  24/7 Veterans Crisis Line: 1-944.100.4168 (Press 1)    If you have private insurance and need to find a specialist, please contact your insurance network to request a list of providers covered by your benefits.      MENTAL HEALTH/ADDICTIVE DISORDERS:     AA ([phone]), NA ([phone])   National Suicide Prevention Lifeline- Call 1-454.468.5338 Available 24 hours everyday  [facility] 553-1777; Crisis Line 910-4599 - Call for options A-F:  Intensive Outpatient Treatment/ Day programs   [facility], please contact   [facility], please contact [phone] or [phone] to speak with an admissions counselor.  [facility] (Methadone Maintenance)   [address] [place] [address], [phone]  [address] [place] [address] [phone]  [facility], [address] [place] [address], [phone]  [facility] [place] [phone]  [facility] please contact [phone]  [facility], [address] [facility], 4th [place] [address] Phone: [phone]   [facility]cer  [place] Office: [place] [address], [phone]  [place] Office: [address] [facility], [address], [place] [address], [phone]  [place] Office: [address] [facility] [place] [address] [phone]    Outpatient Substance Abuse Treatment   [facility] (Methadone Maintenance)   [address] [place] [address], [phone]  [address] [place] [place] [address] [phone]  [facility], [address] [place] [address], [phone]  Acer  [place] Office: [address], [place] [address], [phone]  [place] Office: [address] [facility] [address], [place] [address],  [phone]  [place] Office: [address] [place] [address] [phone]  [place] [facility], [address] [place] [address] [phone]   [facility], [address] [facility], [address], [phone]  [facility], [address] [place], [place] [phone]    Residential Substance Abuse Treatment   [facility] [address] [facility], [phone]  [facility], 4[address] [facility], [phone]  [facility] (men only) [address] [place] [address], [phone]  [facility] at the [facility] (women only) [address] [place] [address] [phone]  [facility], [address] [place] [address] [phone]  [facility] (women only), intakes at 4[address] [facility], [phone]  [facility] (7-day program, $100, [address], [phone], [phone], [phone])  [facility] (Men only, [phone]), [address], [place] (Vets*/Non-Vets)  [facility] (Men only, $400/month program fee) 15[address] [facility], [phone]  [facility] (Women over age 39 only), 2407 [facility], [phone]    Out of Area:    [place], [address] [facility] [address], [place], [place] ([phone])  [facility] (men only), [address] [facility] [place] [address], [phone]  [facility], [address] [place], [place] [phone])  [place], [address] [place] [address] Dr. Waite, [place] ([phone])  [facility] Addiction [facility], 111 [facility], [phone]  [facility] (Women only, has to have mental illness, can be homeless or substance abuser), [phone]        DOMESTIC VIOLENCE RESOURCES:     Advocacy  [facility] ([facility])  [address] [address]    [facility] ? [phone]  Services provided: emergency shelter, individual advocacy,  information and referrals, group support, children's program, medical advocacy, forensic medical exams, primary care, legal assistance, counseling, safety planning, and caregiver support    [facility] HEALING AND EMPOWERMENT [place]  Confidential location  [facility] ? [phone]  Services provided: short term emergency shelter, all services provided are free of charge    [facility] CENTERS FOR COMMUNITY ADVOCACY  Multiple locations in [facility], [facility], [place], and [facility] ([place], [place], and [place])    [place] ? [phone]  Services provided: emergency shelter, individual advocacy, information and referrals, group support, children's program, medical advocacy, legal assistance in obtaining restraining orders, counseling, safety planning, and caregiver support    [facility]   Emergency Shelter   [phone]  Emergency Services ,Legal and Financial Assistance Services ,Housing Services ,Support Services     [place] [facility]   [phone]  Emergency Services ,Counseling Services , Housing Services ,Support Services ,Children's Services     WOMEN WITH A VISION  [address] [place] [address]  WWAV ? [phone]  Services provided: advocacy, health education and supportive services, specializing in free healing services for marginalized groups, including LGBTQ individuals and sex workers    SEXUAL TRAUMA AWARENESS AND RESPONSE (STAR)  [address] [place] [address]    STAR ? [phone]  Services provided: individual advocacy, information and referrals, group support, medical advocacy, legal assistance, counseling, and safety planning for survivors of sexual assault    [facility] ([facility])  [address] [place] [address]  [facility] Forensic Program ? [phone]  Services provided: free forensic medical exams for sexual assault and domestic violence, which can be performed up to 5 days  after an incident. It is not necessary to make a police report to receive a forensic medical exam    Legal  [facility]  [address], [place] [address]  Project SAVE ? [phone]  Services provided: free emergency legal representation for survivors of doemstic violence residing in [facility]. Legal services may include temporary restraining orders, temporary child support, custody, and use of property    [facility] LEGAL SERVICES (SLLS)  [address] [facility], [place] [address]  SLLS ? [phone]  Services provided: free legal representation for survivors of domestic violence residing in [facility]. Legal services may include temporary child support, custody, and divorce      HOTLINES:     [facility] DOMESTIC VIOLENCE HOTLINE  [phone]    Services provided: free and confidential hotline for victims and survivors of domestic violence. All calls will be routed to a domestic violence service provided in the victim or survivor's area    NATIONAL HUMAN TRAFFICKING HOTLINE  ([phone] [phone]    Services provided: national anti-trafficking hotline serving victims and survivors of human trafficking. Provides information about local resources, and access to safe space to report tips, seek services, and ask for help    VIA LINK  211 or [phone]    Service provided: counselors can provide crisis counseling. Counselors can also provide information and referrals to programs which can help with needs such as food, shelter, medical care, financial assistance, mental health services, substance abuse treatment, senior services, childcare, and more      HOMELESS SHELTERS:      Homeless shelters  [facility]  Emergency shelter for individuals and families  [address]  [phone]  [facility]  Emergency shelter for men only  Meals daily 6am, 2pm, & 6pm  Clothing, case management M-F by appointment (ID/job/housing/legal assistance), mail  [address]    [phone]  [place]  Emergency shelter for men  [address] [facility]  [phone]  Emergency shelter for women  [address]   [phone]  Breakfast & lunch daily, dinner M-F  Case management, job counseling services   [facility]  Emergency shelter for teens and young adults up to 20yo  [address]   [phone]  [place] [facility]  Emergency shelter for women over 19yo and their kids  [address] [place] [address]  [phone]  [facility]  Day program, meals M-F 1PM (arrive early)  Showers, laundry, hygiene kits, showers, phones, , notary services, case management, ID assistance  [address]   [phone] M-F 8am-2:30pm  [employer]  Day program  MTW 7:30am-3:30pm,  8:30am-3:30pm  Crisis intervention, employment assistance, food/clothing, hygiene kits, bus tokens, mail  [address]  [phone]  [facility]  Mobile outreach for homeless persons in [facility]  [phone]  [facility]  Primary healthcare, case management, dental services, TB placement  Call ahead  [address]  [phone]  [facility]  Connects homeless people with their loved ones in other cities by providing transportation costs   [phone]      [place] RESOURCES:     [place] [facility] Health Crisis Response Team:    [place] ([place], [place], [place], and [facility]) ([facility] and [facility])  [phone]      Outpatient Mental Health & Addiction Clinic Resources for both [facility] and [facility]:    [facility] Resources  Website: www.mhr.org  Main Number: 676-071-9433    [facility] ([facility])  [address] ([address] [place] [address]  [phone]    [facility] ([facility])  [address] ([address] [facility]) [facility]  [place] [address]  [phone]    [facility]  [address] ([address]) [place] [address]  [phone]    [facility]  [address] ([address] [place] [address]) [place] [address]  [phone]      Addiction Treatment Resources for both [facility] and [facility]:    [facility] (Detox, Residential, PHP, IOP, and Aftercare Programs)  [address] [place] [address]  [phone]    [facility] (Residential, IOP, Transitional Living, and Aftercare Programs)  [address] [place] [address]  [phone]    [facility] (Inpatient, Residential, Detox, IOP, Outpatient, and Aftercare Programs)  [address] [place] [address] [place] [address]  [phone] or toll free at [phone]      Outpatient Mental Health Psychotherapy Resources for both [facility] and [facility]:    Lamar Hammond, [facility]  [address]  [place] [address]  [phone]  Specialties: Depression, Anxiety, and Life Transitions    Laine Ramires, PhD  [address] [place] [address] [address]  [address]  [place] [address]  [phone]  Specialties: Testing and Evaluation, Education and Learning Disabilities, and ADHD    Mariely Castle, [facility] [address] [place] [address], [place] [address]  [phone]  Specialties: Obsessive-Compulsive (OCD), Depression, and Relationship Issues    [facility] [address] [place] [address]  [phone]  Specialties: Trauma & PTSD, Mood Disorders, and Anxiety    Rose Marie Ribera, PhD, [facility]  [facility] [address] [place] [address]  [phone]  Specialties: Family Conflict, Child, and Relationship Issues    Humaira Dewitt, [facility] [place] [address] [phone]  Specialties: Anxiety, Depression, and Anger Management        IN CASE OF SUICIDAL THINKING,  call the National Suicide Hotline Number: 988    988 Suicide & Crisis Lifeline: 988 , 6-447-875-TALK 8255)  Provides 24/7, free and confidential support for people in distress, prevention and crisis resources for you or your loved ones, and best practices for professionals.    Call, text or chat.  https://988[employer].org

## 2024-11-18 NOTE — ASSESSMENT & PLAN NOTE
Recent Labs     11/17/24  0853 11/18/24  0413 11/19/24  0339   * 138 139     Na 122 on admission. Serum osm 264, Urine osm 409, Urine Na 30. Not fully explained by alcohol use / beer potomania, resolved now     Na improving but remains hyponatremic. Will check RUQ US to evaluate for cirrhosis given alcohol use history.     --Daily CMP  -- Hold SSRI  -- Follow-up RUQ US

## 2024-11-18 NOTE — ASSESSMENT & PLAN NOTE
"   presented to ED with c/o shortness of breath and "heart racing". Has been trying to cut back on alcohol use. Now in alcohol withdrawals.      11/15: Patient required 14 mg PRN ativan since admission in addition to scheduled diazepam. Plan to increase scheduled diazepam, continue titrating PRN ativan and precedex gtt.  11/16: Precedex weaned off, required 1 dose of PRN ativan overnight.     -- Diazepam taper: 10 mg TID -> 10 mg BID -> 5 mg BID -> 5 mg daily -> stop  -- Continue PRN ativan 2 mg for CIWA > 8  -- Stop precedex gtt  --Daily thiamine, folic acid, multivitamin  --CIWA protocol q4hrs    11/18 agitated overnight - combative with nursing staff. AAO x 2  -  given ativan/haldol and wrist restraints but later was able to get out of restraints and out of the room. psychiatry follow up       "

## 2024-11-18 NOTE — PROGRESS NOTES
[facility] - Telemetry [facility] Medicine  Progress Note    Patient Name: Donald Warren Abadie  MRN: [id number]  Patient Class: IP- Inpatient   Admission Date: 11/14/2024  Length of Stay: 4 days  Attending Physician: Mehdi Ramey MD  Primary Care Provider: Bacilio Larry MD        Subjective:     Principal Problem:Alcohol withdrawal syndrome with complication        HPI:  Donald Abadie is a 69-year-old male with a past medical history of Afib, gout, anxiety, left sided RCC s/p partial nephrectomy (2014), HTN, HLD, subdural hematoma (9/20/2024), and alcoholism who presents to the ED with concern of alcohol withdrawal. He has been trying to cut down on his drinking after a recent fall with a head injury and subdural but typically drinks about 5-6 beers daily. His daughter came to his house because he was short of breath and was complaining that his heart was racing. She is a nurse and found him in Afib with elevated rate. He fell the night prior to admission as well. She is worried about him being able to detox at home. He states that he is feeling short of breath and anxious. He had minimal confusion at the time of arrival to the ED.     In the ED, , Na 122, Cl 90, CO2 20, glucose 130, BUN 6, Creatinine 1.6, T.Bili 1.6, AST 67, Magnesium 1.3. CT head with no evidence of acute intracranial hemorrhage. At the time of MICU evaluation the patient is tachycardic, tachypneic, and diaphoretic despite 4 mg IV ativan and 5 mg PO diazepam. He exhibits hallucinations and nonsensical speech upon evaluation.         Overview/Hospital Course:    The patient was admitted to hospital medicine for further management of his acute alcohol withdrawals. He was started on scheduled and as needed benzodiazepines as well as a precedex gtt. His hyponatremia was trended and treated with IV fluids and holding his home SSRI. His withdrawal symptoms improved and precedex was weaned off. He was continued on a valium taper with  PRN ativan as needed.     No acute overnight events. Patient only required 1 dose of PRN ativan 2 mg for CIWA > 8. Precedex gtt weaning.    11/17 Transfer to [facility] medicine  [facility] of A-fib, gout, anxiety, h/o RCC s/p partial nephrectomy (2014), HTN, HLD, subdural hematoma in September 2024 presenting with acute alcohol withdrawals. Initially required a precedex gtt on top of scheduled and as needed benzos, now doing well on scheduled benzo taper with additional PRN ativan driven by CIWA. Continue diazepam taper with  PRN ativan for breakthrough CIWA scoring. RUQ US ordered. no historical cirrhosis but his mild hyponatremia is concerning for cirrhosis w/ his alcohol history. given bicarb drip for nonanion gap metabolic acidosis. Addiction psychiatry consulted. recurrent falls. PT/OT consulted. EP follow up to discuss frequent falls while on Eliquis.  not had any ischemic workup since his EF became mildly reduced sometime in 2021. recent neuropsychiatry eval - needs help with most IADLs, indicating a diagnosis of Major Neurocognitive Disorder/dementia, mild stage likely with severe alcohol use . encourage alcohol cessation. CT head 11/14 -No evidence of acute intracranial hemorrhage as above.  orthopedic f/u 9/17 -acute nondisplaced long spiral fracture of the lateral malleolus at the level of the syndesmosis. Nondisplaced avulsion fracture involving the tip of the medial malleolus. Ankle mortise remains congruent on weight-bearing stress x-rays. orthopedics consulted for weight bearing status . orthostatic hypotension +. continue gentle LR hydration .sodium improved to 135. K and Mg replaced   11/18 agitated overnight - combative with nursing staff.  given ativan/haldol and wrist restraints but later was able to get out of restraints and out of the room. psychiatry follow up  . AAOX 3 this AM        Review of Systems:   Pain scale:   Constitutional:  fever,  chills, headache, vision loss, hearing loss, weight loss,  Generalized weakness, falls, loss of smell, loss of taste, poor appetite,  sore throat  Respiratory: cough, shortness of breath.   Cardiovascular: chest pain, dizziness, palpitations, orthopnea, swelling of feet, syncope  Gastrointestinal: nausea, vomiting, abdominal pain, diarrhea, black stool,  blood in stool, change in bowel habits, constipation  Genitourinary: hematuria, dysuria, urgency, frequency  Integument/Breast: rash,  pruritis  Hematologic/Lymphatic: easy bruising, lymphadenopathy  Musculoskeletal: arthralgias , myalgias, back pain, neck pain, knee pain  Neurological: confusion -improved , seizures, tremors, slurred speech  Behavioral/Psych:  depression, anxiety, auditory or visual hallucinations     OBJECTIVE:     Physical Exam:  Body mass index is 33.15 kg/m².    Constitutional: Appears well-developed and well-nourished. obesity  Head: Normocephalic and atraumatic.   Neck: Normal range of motion. Neck supple.   Cardiovascular: Normal heart rate.  Regular heart rhythm.  Pulmonary/Chest: Effort normal.   Abdominal: No distension.  No tenderness  Musculoskeletal: Normal range of motion. No edema.   Neurological: Alert and oriented to person, place, and time.   Skin: Skin is warm and dry.   Psychiatric: Normal mood and affect. Behavior is normal.                  Vital Signs  Temp: 97.6 °F (36.4 °C) (11/18/24 1115)  Pulse: 66 (11/18/24 1115)  Resp: 19 (11/18/24 1115)  BP: 104/65 (11/18/24 1115)  SpO2: 100 % (11/18/24 1115)     24 Hour VS Range    Temp:  [97.6 °F (36.4 °C)-97.7 °F (36.5 °C)]   Pulse:  [61-67]   Resp:  [17-19]   BP: (104-148)/(64-82)   SpO2:  [99 %-100 %]   No intake or output data in the 24 hours ending 11/18/24 1242        I/O This Shift:  No intake/output data recorded.    Wt Readings from Last 3 Encounters:   11/15/24 77 kg (169 lb 12.1 oz)   11/07/24 77.5 kg (170 lb 13.7 oz)   10/03/24 78.4 kg (172 lb 13.5 oz)       I have personally reviewed the vitals and recorded Intake/Output  "    Laboratory/Diagnostic Data:    CBC/Anemia Labs: Coags:    Recent Labs   Lab 11/16/24  0503 11/17/24  0853 11/18/24  0413   WBC 6.17 5.62 5.07   HGB 13.4* 13.8* 13.0*   HCT 38.6* 40.3 38.0*   PLT 95* 117* 121*   MCV 94 96 95   RDW 13.5 13.7 14.0    No results for input(s): "PT", "INR", "APTT" in the last 168 hours.     Chemistries: ABG:   Recent Labs   Lab 11/15/24  0353 11/15/24  1214 11/16/24  0503 11/16/24  1118 11/17/24  0853 11/18/24  0413   *   < > 127* 129* 135* 138   K 3.1*   < > 4.6 4.8 3.3* 3.3*   CL 97   < > 100 101 97 101   CO2 18*   < > 21* 14* 27 28   BUN 8   < > 9 9 6* 7*   CREATININE 1.1   < > 1.0 1.1 1.0 1.1   CALCIUM 8.5*   < > 8.9 8.8 9.1 9.2   PROT 5.9*  --  6.2  --  6.6 6.6   BILITOT 1.3*  --  1.2*  --  1.0 0.6   ALKPHOS 64  --  63  --  68 61   ALT 21  --  22  --  21 20   AST 47*  --  38  --  33 33   MG 2.0  --  1.5*  --  1.4* 1.7   PHOS 3.6  --  2.8  --   --  3.0    < > = values in this interval not displayed.    No results for input(s): "PH", "PCO2", "PO2", "HCO3", "POCSATURATED", "BE" in the last 168 hours.     POCT Glucose: HbA1c:    Recent Labs   Lab 11/17/24  0806 11/17/24  1703 11/17/24  1845 11/17/24  2128 11/18/24  0816 11/18/24  1114   POCTGLUCOSE 124* 52* 115* 154* 113* 224*    Hemoglobin A1C   Date Value Ref Range Status   11/15/2024 5.3 4.0 - 5.6 % Final     Comment:     ADA Screening Guidelines:  5.7-6.4%  Consistent with prediabetes  >or=6.5%  Consistent with diabetes    High levels of fetal hemoglobin interfere with the HbA1C  assay. Heterozygous hemoglobin variants (HbS, HgC, etc)do  not significantly interfere with this assay.   However, presence of multiple variants may affect accuracy.     02/29/2024 5.9 (H) 4.0 - 5.6 % Final     Comment:     ADA Screening Guidelines:  5.7-6.4%  Consistent with prediabetes  >or=6.5%  Consistent with diabetes    High levels of fetal hemoglobin interfere with the HbA1C  assay. Heterozygous hemoglobin variants (HbS, HgC, etc)do  not " "significantly interfere with this assay.   However, presence of multiple variants may affect accuracy.     10/29/2023 8.0 (H) 4.0 - 5.6 % Final     Comment:     ADA Screening Guidelines:  5.7-6.4%  Consistent with prediabetes  >or=6.5%  Consistent with diabetes    High levels of fetal hemoglobin interfere with the HbA1C  assay. Heterozygous hemoglobin variants (HbS, HgC, etc)do  not significantly interfere with this assay.   However, presence of multiple variants may affect accuracy.          Cardiac Enzymes: Ejection Fractions:    No results for input(s): "CPK", "CPKMB", "MB", "TROPONINI" in the last 72 hours.   EF   Date Value Ref Range Status   11/30/2022 45 % Final   11/01/2021 45 % Final          No results for input(s): "COLORU", "APPEARANCEUA", "PHUR", "SPECGRAV", "PROTEINUA", "GLUCUA", "KETONESU", "BILIRUBINUA", "OCCULTUA", "NITRITE", "UROBILINOGEN", "LEUKOCYTESUR", "RBCUA", "WBCUA", "BACTERIA", "SQUAMEPITHEL", "HYALINECASTS" in the last 48 hours.    Invalid input(s): "WRIGHTSUR"    Lactate (Lactic Acid) (mmol/L)   Date Value   08/04/2024 1.2   11/29/2022 2.5 (H)   11/29/2022 5.7 (HH)   03/17/2019 0.9   03/17/2019 0.7     BNP (pg/mL)   Date Value   11/14/2024 371 (H)   04/22/2024 516 (H)   04/02/2024 529 (H)   10/28/2023 226 (H)   11/29/2022 324 (H)     Sed Rate (mm/hr)   Date Value   10/04/2006 5   12/21/2005 28 (H)     D-Dimer (mg/L FEU)   Date Value   10/31/2021 0.46   09/25/2018 0.42     Ferritin   Date Value   02/16/2017 858 ng/mL (H)   11/16/2016 1,344 ng/mL (H)   06/28/2004 706.7 ng/ml (H)     No results found for: "LDH"  Troponin I (ng/mL)   Date Value   11/14/2024 0.006   11/14/2024 0.006   09/12/2024 <0.012   09/12/2024 <0.012   08/04/2024 <0.006   04/22/2024 <0.006   04/02/2024 <0.006   10/28/2023 <0.006     CPK (U/L)   Date Value   11/15/2024 124   06/21/2023 72   03/15/2019 189     CK (U/L)   Date Value   06/13/2024 64     No results found. However, due to the size of the patient record, not all " encounters were searched. Please check Results Review for a complete set of results.  SARS-CoV2 (COVID-19) Qualitative PCR (no units)   Date Value   10/29/2023 Not Detected   11/29/2022 Not Detected     POC Rapid COVID (no units)   Date Value   10/31/2021 Negative       Microbiology labs for the last week  Microbiology Results (last 7 days)       ** No results found for the last 168 hours. **            Reviewed and noted in plan where applicable- Please see chart for full lab data.    Lines/Drains:       Peripheral IV - Single Lumen 11/14/24 1650 20 G Anterior;Right Forearm (Active)   Site Assessment Clean;Dry;Intact 11/17/24 0400   Line Securement Device Secured with sutureless device 11/14/24 1930   Extremity Assessment Distal to IV No abnormal discoloration 11/16/24 2000   Line Status Saline locked 11/17/24 0400   Dressing Status Clean;Dry;Intact 11/17/24 0400   Dressing Intervention Integrity maintained 11/17/24 0400   Dressing Change Due 11/18/24 11/16/24 2000   Site Change Due 11/18/24 11/16/24 2000   Reason Not Rotated Not due 11/16/24 2000   Number of days: 2            Peripheral IV - Single Lumen 11/14/24 1746 20 G Right Hand (Active)   Site Assessment Clean;Dry;Intact 11/17/24 0400   Line Securement Device Secured with sutureless device 11/14/24 1930   Extremity Assessment Distal to IV No abnormal discoloration 11/16/24 2000   Line Status Saline locked 11/17/24 0400   Dressing Status Clean;Dry;Intact 11/17/24 0400   Dressing Intervention Integrity maintained 11/17/24 0400   Dressing Change Due 11/18/24 11/16/24 2000   Site Change Due 11/18/24 11/16/24 2000   Reason Not Rotated Not due 11/16/24 2000   Number of days: 2       Male External Urinary Catheter 11/14/24 1800 (Active)   Collection Container Other (Comment) 11/16/24 1823   Securement Method other (see comments) 11/16/24 1823   Skin no redness;no breakdown 11/16/24 2000   Tolerance no signs/symptoms of discomfort 11/16/24 2000   Output (mL) 300 mL  11/16/24 1823   Catheter Change Date 11/16/24 11/16/24 2000   Catheter Change Time 1824 11/16/24 2000   Number of days: 2       Imaging  ECG Results              EKG 12-lead (Final result)        Collection Time Result Time QRS Duration OHS QTC Calculation    11/14/24 11:55:34 11/14/24 14:10:17 82 430                     Final result by Interface, Lab In [facility] (11/14/24 14:10:20)                   Narrative:    Test Reason :    Vent. Rate : 100 BPM     Atrial Rate : 107 BPM     P-R Int :    ms          QRS Dur :  82 ms      QT Int : 334 ms       P-R-T Axes :     -4  -6 degrees    QTcB Int : 430 ms    Baseline Artifact  Atrial fibrillation  Abnormal ECG  When compared with ECG of 14-Nov-2024 11:54,  No significant change was found  Confirmed by Quique Hernandez ([id number]) on 11/14/2024 2:10:16 PM    Referred By: AAAREFERRAL SELF           Confirmed By: Quique Hernandez                                     EKG 12-lead (Final result)        Collection Time Result Time QRS Duration OHS QTC Calculation    11/14/24 11:54:01 11/14/24 14:10:03 98 499                     Final result by Interface, Lab In [facility] (11/14/24 14:10:12)                   Narrative:    Test Reason : R07.9,    Vent. Rate : 106 BPM     Atrial Rate : 258 BPM     P-R Int :    ms          QRS Dur :  98 ms      QT Int : 376 ms       P-R-T Axes :     -4   6 degrees    QTcB Int : 499 ms    Baseline Artifact  Atrial fibrillation with rapid ventricular response  Abnormal ECG  When compared with ECG of 14-Nov-2024 11:14,  No significant change was found  Confirmed by Quique Hernandez ([id number]) on 11/14/2024 2:09:58 PM    Referred By: AAAREFERRAL SELF           Confirmed By: Quique Hernandez                                     EKG 12-lead (Final result)        Collection Time Result Time QRS Duration OHS QTC Calculation    11/14/24 11:14:35 11/14/24 14:09:47 86 474                     Final result by Interface, Lab In [facility] (11/14/24 14:09:51)                    Narrative:    Test Reason :    Vent. Rate : 102 BPM     Atrial Rate : 100 BPM     P-R Int :    ms          QRS Dur :  86 ms      QT Int : 364 ms       P-R-T Axes :      1   0 degrees    QTcB Int : 474 ms    Baseline Artifact  Atrial fibrillation with rapid ventricular response  Abnormal ECG  When compared with ECG of 14-Nov-2024 10:06,  No significant change was found  Confirmed by Quique Hernandez ([id number]) on 11/14/2024 2:09:44 PM    Referred By: KUNAL SELF           Confirmed By: Quique Hernandez                                     EKG 12-lead (Final result)        Collection Time Result Time QRS Duration OHS QTC Calculation    11/14/24 10:06:07 11/14/24 14:09:30 82 481                     Final result by Interface, Lab In [facility] (11/14/24 14:09:38)                   Narrative:    Test Reason : R06.02,    Vent. Rate : 111 BPM     Atrial Rate :    BPM     P-R Int :    ms          QRS Dur :  82 ms      QT Int : 354 ms       P-R-T Axes :      0  -3 degrees    QTcB Int : 481 ms    Atrial fibrillation with rapid ventricular response  Abnormal ECG  No previous ECGs available  Confirmed by Quique Hernandez ([id number]) on 11/14/2024 2:09:28 PM    Referred By: KUNAL SELF           Confirmed By: Quique Hernandez                                    Results for orders placed during the hospital encounter of 11/14/24    Echo    Interpretation Summary    Left Ventricle: The left ventricle is normal in size. Normal wall thickness. There is concentric remodeling. There is low normal systolic function with a visually estimated ejection fraction of 50 - 55%. There is normal diastolic function.    Right Ventricle: Normal right ventricular cavity size. Wall thickness is normal. Systolic function is normal.    Mild biatrial enlargement    Aortic Valve: The aortic valve is a trileaflet valve. There is mild aortic valve sclerosis.    Pulmonary Artery: The estimated pulmonary artery systolic pressure is 25 mmHg.    IVC/SVC: Normal venous  pressure at 3 mmHg.      US Abdomen Limited  Narrative: EXAMINATION:  US ABDOMEN LIMITED    CLINICAL HISTORY:  evaluation for cirrhosis, RUQ ultrasound;.    TECHNIQUE:  Limited ultrasound of the right upper quadrant of the abdomen including pancreas, liver, gallbladder, common bile duct was performed.    COMPARISON:  Ultrasound 10/09/2024, CT 05/02/2024    FINDINGS:  Pancreas: The visualized portions of pancreas appear normal.    Liver: Normal in size, measuring 17.4 cm. Homogeneous echotexture. No focal hepatic lesions. Hepatorenal index 1.1.    Gallbladder: Cholelithiasis.  No gallbladder wall thickening, pericholecystic fluid or sonographic Steel sign.    Biliary system: The common duct is not dilated, measuring 3 mm.  No intrahepatic ductal dilatation.    Spleen: Normal in size with a heterogeneous echotexture, measuring 10.6 x 6.1 cm.    Miscellaneous: No ascites.  Impression: Cholelithiasis without secondary signs of cholecystitis.    Electronically signed by resident: Jamel Villalpando  Date:    11/17/2024  Time:    08:04    Electronically signed by: Quique Merino Jr  Date:    11/17/2024  Time:    11:12      Labs, Imaging, EKG and Diagnostic results from 11/18/2024 were reviewed.    Medications:  Medication list was reviewed and changes noted under Assessment/Plan.  No current facility-administered medications on file prior to encounter.     Current Outpatient Medications on File Prior to Encounter   Medication Sig Dispense Refill    allopurinoL (ZYLOPRIM) 100 MG tablet TAKE 2 TABLETS(200 MG) BY MOUTH EVERY  tablet 0    cyanocobalamin (VITAMIN B-12) 1000 MCG tablet Take 1 tablet (1,000 mcg total) by mouth once daily.      diclofenac sodium (VOLTAREN) 1 % Gel Apply 2 g topically 3 (three) times daily as needed (Right knee - hand pain).      dronedarone (MULTAQ) 400 mg Tab Take 1 tablet (400 mg total) by mouth 2 (two) times daily with meals. (Patient taking differently: Take 400 mg by mouth once  daily.) 60 tablet 11    ELIQUIS 5 mg Tab TAKE 1 TABLET BY MOUTH TWICE DAILY (Patient taking differently: Take 5 mg by mouth 2 (two) times daily.) 180 tablet 3    folic acid (FOLVITE) 1 MG tablet Take 1 tablet (1 mg total) by mouth once daily. 30 tablet 11    pantoprazole (PROTONIX) 40 MG tablet Take 1 tablet (40 mg total) by mouth once daily. 90 tablet 3    rOPINIRole (REQUIP) 1 MG tablet TAKE 1 TABLET(1 MG) BY MOUTH EVERY EVENING (Patient taking differently: Take 1 tablet by mouth daily as needed (restless leg).) 90 tablet 3    rosuvastatin (CRESTOR) 20 MG tablet TAKE 1 TABLET(20 MG) BY MOUTH EVERY DAY 90 tablet 3    sertraline (ZOLOFT) 100 MG tablet Take 1 tablet (100 mg total) by mouth once daily. 90 tablet 3    tamsulosin (FLOMAX) 0.4 mg Cap TAKE 1 CAPSULE(0.4 MG) BY MOUTH EVERY DAY (Patient taking differently: Take 0.4 mg by mouth every evening.) 90 capsule 3    VITAMIN B-1 100 MG tablet TAKE 1 TABLET BY MOUTH ONCE DAILY 30 tablet 2     Scheduled Medications:  Current Facility-Administered Medications   Medication Dose Route Frequency    apixaban  5 mg Oral BID    diazePAM  10 mg Oral Q12H    Followed by    [START ON 11/19/2024] diazePAM  5 mg Oral Q12H    Followed by    [START ON 11/20/2024] diazePAM  5 mg Oral Daily    dronedarone  400 mg Oral Daily    folic acid  1 mg Oral Daily    multivitamin  1 tablet Oral Daily    mupirocin   Nasal BID    pantoprazole  40 mg Oral Daily    thiamine (B-1) 250 mg in D5W 100 mL IVPB  250 mg Intravenous Daily    Followed by    [START ON 11/20/2024] thiamine  100 mg Oral Daily     PRN:   Current Facility-Administered Medications:     dextrose 10%, 12.5 g, Intravenous, PRN    dextrose 10%, 25 g, Intravenous, PRN    diclofenac sodium, 2 g, Topical (Top), TID PRN    glucagon (human recombinant), 1 mg, Intramuscular, PRN    glucose, 16 g, Oral, PRN    glucose, 24 g, Oral, PRN    insulin aspart U-100, 0-5 Units, Subcutaneous, QID (AC + HS) PRN    lorazepam, 2 mg, Intravenous, Q1H  "PRN    ondansetron, 4 mg, Intravenous, Q8H PRN    sodium chloride 0.9%, 10 mL, Intravenous, PRN  Infusions:       Estimated Creatinine Clearance: 53.7 mL/min (based on SCr of 1.1 mg/dL).             Assessment/Plan:      * Alcohol withdrawal syndrome with complication     presented to ED with c/o shortness of breath and "heart racing". Has been trying to cut back on alcohol use. Now in alcohol withdrawals.      11/15: Patient required 14 mg PRN ativan since admission in addition to scheduled diazepam. Plan to increase scheduled diazepam, continue titrating PRN ativan and precedex gtt.  11/16: Precedex weaned off, required 1 dose of PRN ativan overnight.     -- Diazepam taper: 10 mg TID -> 10 mg BID -> 5 mg BID -> 5 mg daily -> stop  -- Continue PRN ativan 2 mg for CIWA > 8  -- Stop precedex gtt  --Daily thiamine, folic acid, multivitamin  --CIWA protocol q4hrs    11/18 agitated overnight - combative with nursing staff. AAO x 2  -  given ativan/haldol and wrist restraints but later was able to get out of restraints and out of the room. psychiatry follow up         Closed fracture of right ankle with routine healing  11/17   orthopedic f/u 9/17 -acute nondisplaced long spiral fracture of the lateral malleolus at the level of the syndesmosis. Nondisplaced avulsion fracture involving the tip of the medial malleolus. Ankle mortise remains congruent on weight-bearing stress x-rays. orthopedics consulted for weight bearing status     orthopedic evaluation - Recommend patient wear his tall cam boot at ALL times while weight-bearing. WBAT in boot. Utilize a walker when ambulating, recommend working with PT.       Orthostatic hypotension    11/16 SBP 70/80s on sitting up in the chair, improved to 99/55 on lying down.  IV hydration   11/17 BP improved , orthostatic hypotension +. continue gentle LR hydration     Falls frequently  11/16 daughter reports 6 falls recently.  x ray shoulder right 8/24 -anterior inferior shoulder " dislocation with impaction in Hill-Sachs deformity on the humeral head. No definite fracture.  Xray right foot 9/14 -Mildly displaced distal fibular fracture -     PT/OT eval -likely needs SNF      11/17  EP follow up to discuss frequent falls while on Eliquis.      Thrombocytopenia  The likely etiology of thrombocytopenia is alcohol abuse. monitor   Recent Labs     11/16/24  0503 11/17/24  0853 11/18/24  0413   PLT 95* 117* 121*         Heart failure with mildly reduced ejection fraction (HFmrEF)    Results for orders placed during the hospital encounter of 11/14/24  Echo  Interpretation Summary    Left Ventricle: The left ventricle is normal in size. Normal wall thickness. There is concentric remodeling. There is low normal systolic function with a visually estimated ejection fraction of 50 - 55%. There is normal diastolic function.    Right Ventricle: Normal right ventricular cavity size. Wall thickness is normal. Systolic function is normal.    Mild biatrial enlargement    Aortic Valve: The aortic valve is a trileaflet valve. There is mild aortic valve sclerosis.    Pulmonary Artery: The estimated pulmonary artery systolic pressure is 25 mmHg.    IVC/SVC: Normal venous pressure at 3 mmHg.     -- No evidence of acute exacerbation      11/17   not had any ischemic workup since his EF became mildly reduced sometime in 2021.     Primary hypertension  , controlled.  Latest blood pressure and vitals reviewed-   Temp:  [97 °F (36.1 °C)-98.3 °F (36.8 °C)]   Pulse:  [51-68]   Resp:  [14-31]   BP: ()/(41-79)   SpO2:  [98 %-100 %] .   not on medications    PRN meds if BP> 180/110 mm HG     Hypomagnesemia  replaced   Recent Labs   Lab 11/18/24  0413   MG 1.7            Metabolic acidosis  non aniongap  . patient with bicarbonate   Recent Labs   Lab 11/16/24  0503 11/16/24  1118 11/17/24  0853   CO2 21* 14* 27    .started on bicarbonate drip x 5h. resolved       Type 2 diabetes mellitus without complication, without  long-term current use of insulin  Patient's FSGs are controlled on current hypoglycemics.   Last A1c reviewed-   Lab Results   Component Value Date    HGBA1C 5.3 11/15/2024    HGBA1C 5.9 (H) 02/29/2024    HGBA1C 8.0 (H) 10/29/2023     Will hold PO hypoglycemics and will start correctional scale insulin  Most recent fingerstick glucose reviewed-   Recent Labs   Lab 11/17/24  1703 11/17/24  1845 11/17/24  2128 11/18/24  0816   POCTGLUCOSE 52* 115* 154* 113*       currently on   Antihyperglycemics (From admission, onward)      Start     Stop Route Frequency Ordered    11/14/24 1554  insulin aspart U-100 pen 0-5 Units         -- SubQ Before meals & nightly PRN 11/14/24 1455             Hyponatremia    Recent Labs     11/16/24  1118 11/17/24  0853 11/18/24  0413   * 135* 138       Na 122 on admission. Serum osm 264, Urine osm 409, Urine Na 30. Not fully explained by alcohol use / beer potomania, resolved now     Na improving but remains hyponatremic. Will check RUQ US to evaluate for cirrhosis given alcohol use history.     --Daily CMP  -- Hold SSRI  -- Follow-up RUQ US       Paroxysmal atrial fibrillation    Hx of atrial fibrillation   --Continue home dronedarone  -- Continue home eliquis  -- Telemetry      11/17 EP follow up to discuss frequent falls while on Eliquis.        VTE Risk Mitigation (From admission, onward)           Ordered     apixaban tablet 5 mg  2 times daily         11/14/24 1431     IP VTE HIGH RISK PATIENT  Once         11/14/24 1431     Place sequential compression device  Until discontinued         11/14/24 1431                    Discharge Planning   DALY: 11/19/2024     Code Status: Full Code   Is the patient medically ready for discharge?:     Reason for patient still in hospital (select all that apply): Treatment  Discharge Plan A: Other (alcohol rehab)   Discharge Delays: None known at this time              Mehdi Ramey MD  Department of Hospital Medicine   [facility] - Telemetry  Stepdown

## 2024-11-18 NOTE — PLAN OF CARE
[facility] - Telemetry Stepdown  Discharge Reassessment    Primary Care Provider: Bacilio Larry MD    Expected Discharge Date: 11/19/2024    Reassessment (most recent)       Discharge Reassessment - 11/18/24 1123          Discharge Reassessment    Assessment Type Discharge Planning Reassessment     Did the patient's condition or plan change since previous assessment? Yes     Discharge Plan discussed with: Adult children     Communicated DALY with patient/caregiver Yes     Discharge Plan A Other   alcohol rehab    Discharge Plan B Skilled Nursing Facility     DME Needed Upon Discharge  other (see comments)   TBD    Transition of Care Barriers Substance Abuse                   Spoke with Chani Gleason (Daughter) [phone] about the patient. Patient is unable to to go back home alone due to falling and not taking care of himself. Family will be having talks with the patient. CM will continue to follow and offer support as needed.  Discharge Plan A and Plan B have been determined by review of patient's clinical status, future medical and therapeutic needs, and coverage/benefits for post-acute care in coordination with multidisciplinary team members.  Kong Farnsworth, [facility] [place]    [facility]  [phone]

## 2024-11-18 NOTE — ASSESSMENT & PLAN NOTE
The likely etiology of thrombocytopenia is alcohol abuse. monitor   Recent Labs     11/16/24  0503 11/17/24  0853 11/18/24  0413   PLT 95* 117* 121*

## 2024-11-18 NOTE — PROGRESS NOTES
"  [facility]   DEPARTMENT OF PSYCHIATRY     IDENTIFIERS & DEMOGRAPHICS:     SERVICE: Addiction  ENCOUNTER: subsequent    -- PATIENT IDENTIFIERS: Donald Warren Abadie  [id number]  1955  69 y.o.  male  -- LOCATION OF PATIENT: unit (med/surg)    -- MODE OF ARRIVAL: self-presented  -- PRESENT WITH PATIENT DURING SESSION: ALONE  -- SOURCES OF INFORMATION: PATIENT  -- LOCATION OF ENCOUNTER PROVIDER: [place], [place]    -- ENCOUNTER PROVIDER: Rod Villegas MD        PRESENTATION:     CHIEF COMPLAINT(S): Alcohol withdrawals    OVERVIEW OF THE HPI:    69 year old male with hx of alcohol use disorder, AFib on Eliquis, DM, GERD, Gout, BPH, HLD, and anxiety who presented to hospital for concerns of alcohol withdrawals. Daughter is a nurse and found him at home in possible Afib and short of breath. Psychiatry was consulted for Alcohol use disorder. Admitted with alcohol withdrawal. Interested in quitting." Patient endorses drinking 4-10 beers per day; has completed rehab in the past with longest period of sobriety 5 years. Utilizes alcohol to cope with exacerbations of anxiety. Started on benzo taper by primary prior to consult.. Started on benzodiazepine taper by primary team prior to addiction consult.    Exam complicated by patient being hard of hearing without hearing aids in; frequently required repetition of questions or would respond inappropriately. Endorses recent relapse with alcohol use. Appearing disoriented at times, required prompting by notewriter to review events leading to hospitalization to which he confirmed. States he drinks anywhere between 4-5 versus 10 bud light beers per day. Last drink was prior to hospitalization, unknown time. Has been drinking since he was 14 yo, states his family were all heavy users of alcohol and have since passed away. Had 5 year period of sobriety after attending rehab, expresses willingness to want to engage in this again. Relapse occurred due to anxiety; endorses " generalized anxiety with no specific triggers. 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 Has found AA meeting helpful in the past, but stopped going when he relapsed. Enjoys drinking alcohol with his friends, which he says he does frequently. Endorses intermittent consumption of marijuana.     SUBJECTIVE/CURRENT FINDINGS:    Patient sleeping but easily arousable. Appeared briefly disoriented on waking, but otherwise answered questions appropriately. He is unable to recall what brought him to the hospital. Discussing his drinking, he indicates that he has been drinking 6 - 12 beers daily for the past 15 years after a 5 year period of sobriety. He states that previously he attended the [facility] and had success with the program, leading to a 5 year period of sobriety. He cites anxiety as a major trigger for his drinking.      He reports that he is feeling better today and denies withdrawal symptoms including tremor, hallucinations, insomnia, anxiety, nausea/vomiting, or headache. He is interested in returning to the [facility] on discharge.     REVIEW OF SYSTEMS:    >> SOURCES: patient     N   Sleep Disturbance/Disruption  no insomnia     N   Appetite/Weight Change   N   Alterations in Energy Level   N   Impaired Focus/Concentration   N   Depressive Symptomatology   N   Excessive Anxiety/Worry   N   Dysregulated Mood/Behavior   N   Manic Symptomatology   N   Psychosis   N   Trauma-Related   N   Pain    Regarding the current presentation, no other significant issues or complaints are voiced or known at this time.       ADD-ON PSYCHOTHERAPY:     N/A         HISTORY:     HISTORY    Allergies:  No known drug allergies     EXAMINATION:     VITALS:  BP (!) 148/80 (BP Location: Left arm, Patient Position: Lying)   Pulse 67   Temp 97.7 °F (36.5 °C) (Oral)   Resp 19   Ht 5' (1.524 m)   Wt 77 kg  "(169 lb 12.1 oz)   SpO2 99%   BMI 33.15 kg/m²     MENTAL STATUS EXAMINATION:  Appearance: appears stated age, normal weight  appropriately dressed, adequately groomed    Behavior & Attitude: participative, under adequate behavioral control, able to redirect  calm, engaged, agreeable, cooperative    Movements & Motor Activity: no psychomotor agitation, no weakness, no tremor    Speech & Language: normal rate, normal volume, normal quantity, normal latency, spontaneous, reciprocal, fluent    hard-of-hearing  Mood: better  Affect: euthymic, reactive    Thought Process & Associations: organized, logical, coherent, relevant, abstract, circumstantial  no loosening of associations    Thought Content & Perceptions: no delusions, no paranoid ideation, no grandiosity, no hallucinations    Sensorium: awake, alert    Orientation: oriented to person, oriented to time, not oriented to current location (Initially states "i though I was home, but I guess I'm still at [facility]")  not oriented to place, not oriented to situation    Recent & Remote Memory: intact (remote)  impaired (recent)    Attention & Concentration: intact    Fund of Knowledge: intact    Insight: partial    Judgment: partial            RISK & REGULATORY:      RISK PARAMETERS (current to the encounter/episode  NOT inclusive of past history):     N   Suicidal Ideation/Threats   N   Suicide Attempts/Gestures   N   Homicidal Ideation/Threats   N   Homicidal Behavior   N   Non-Suicidal Self-Injurious Behavior   N   Perpetrated Violence     SAFETY SCREENINGS:    -- PROTECTIVE FACTORS: IDENTIFIED       - SPECIFIC FACTORS IDENTIFIED: accessible support, loving attachments, purpose/meaning, social supports, therapeutic alliance    -- RISK FACTORS: IDENTIFIED     - SPECIFIC MODIFIABLE FACTORS IDENTIFIED: substance abuse, intoxication/use     - SPECIFIC NON-MODIFIABLE FACTORS IDENTIFIED: age 59+ years    -- FUTURE " ORIENTED: YES  -- REMORSE/REGRET: YES     REGULATORY:      INFORMED CONSENT & SHARED DECISION MAKING are the hallmark and bedrock of good clinical care, and as such have been employed and obtained, respectively, to the degree possible.  Discussed, to the extent possible, diagnosis, risks and benefits of proposed treatment (e.g., medication, therapy) vs alternative treatments vs no treatment, potential side effects of these treatments, and the inherent unpredictability of treatment.  Resources have been provided via the patient instructions in the AVS to supplement, augment, and reinforce discussions, counseling, and/or interventions.      WARNINGS & PRECAUTIONS:  >> In cases of emergencies (e.g. SI/HI resulting in danger to self or others, functioning deteriorating to the level of grave disability), call 911 or [phone], or present to the emergency department for immediate assistance.    >> Individuals should not operate a motor vehicle or heavy machinery if effects of medications or underlying symptoms/condition impair the ability to do so safely.    >> FULLY comply with ANY/ALL medication as prescribed/instructed and report ANY/ALL suspected adverse effects to appropriate health care providers.       ASSESSMENT & PLAN:     DIAGNOSES & PROBLEMS:       1.  Alcohol use disorder, severe    2.  Major neurocognitive disorder, mild    PSYCHOTROPIC REGIMEN:   (C)=Continue as prescribed  (A)=Adjust as noted  (I)=Iniitate  (D)=Discontinue      1.  Benzodiazepine taper (C)    Advance taper as tolerated; though it is equally as important to be prepared to increase doses of benzodiazepines in the short run if withdrawal symptoms worsen of fail to adequately improve    -- ASSESSMENT (synthesis  analysis):     69 year old male with hx of alcohol use disorder, AFib on Eliquis, DM, GERD, Gout, BPH, HLD, and anxiety who presented to hospital for concerns of alcohol withdrawals. Daughter is a nurse and found him at home in possible Afib  and short of breath. Psychiatry was consulted for Alcohol use disorder. Admitted with alcohol withdrawal. Interested in quitting. Started on benzodiazepine taper for alcohol withdrawals. Recommend continuing benzodiazepine taper as listed above and monitoring with CIWA precautions until resolution of symptoms. Patient's mental status appears to be improving and he does not report symptoms of alcohol withdrawal at this time. He is interested in [facility] on discharge.     -- PLAN (goals  recommentations):     - CURRENT CONDITION: exacerbated  as compared to typical baseline  - WITH REASONABLE MEDICAL CERTAINTY, based on history, chart review, available collateral information, and a present-state examination:   -- psychiatric hospitalization is not indicated    * PEC is NOT INDICATED - rescind if in place     >> contingent on accessibility and willingness, patient would benefit from the following referrals/services: outpatient psychiatric services, psychotherapy/counseling, addiction rehab program, mutual self-help groups (e.g. Alcoholics Anonymous, [employer]), and psychiatric intensive outpatient or partial hospital program (IOP/PHP)  >> active alcohol (or sedative-hypnotic) withdrawal is present, including s/sx of complicated withdrawal  >> continue alcohol (or sedative-hypnotic) withdrawal protocol  >> case discussed with staff psychiatrist  >> will sign off at this time     DELIRIUM PROTOCOL      > DELIRIUM is a HIGH RISK MEDICAL CONDITION with significantly elevated rates of morbidity and mortality, routinely rendering a person temporarily incapacitated and interfering with the effective management of underlying medical conditions. As such, proactive and comprehensive management is essential. Implementation of the following recommendations should be considered BEST PRACTICE:  >> if feasible, please utilize non-pharmacologic approaches FIRST for agitation; PRN medications can be considered if this  approach is insufficient or ineffective  >> AVOID the use of PHYSICAL RESTRAINTS whenever possible; whenever not, always seek to MINIMIZE their use  >> keep window shades open and room lit during day and room dim at night in order to promote normal sleep-wake cycles  >> reduce unnecessary stimulation/noise; TV screen and sound should be off unless patient is actively engaged with the program  >> minimize disruptions at night  >> correct sensory deficits, when present, with provision of eyeglasses and/or hearing aids  >> optimize nutrition and hydration status  >> continue to manage medical conditions and assess for and treat pain  >> consider PT/OT consult, as early mobility and exercise have been shown to decrease duration of delirium  >> attempt to maintain consistency of key staff who will routinely interact with the patient  >> encourage family at bedside  >> orient patient often to date, location, and situation, including reason for hospitalization and current plan of care  >> keep whiteboard (or similar) in patient's room current with the date and names of key treatment team members  >> utilize 1:1 sitter for monitoring, if warranted  >> LIMIT or DISCONTINUE the use of narcotics, benzodiazepines, anticholinergics, antihistaminergics, steroids, and other known deliriogenic medications  >> continue treatment of the underlying causative etiology of delirium, the correction of which is the ultimate objective in delirium management; if unknown, continue exhaustive medical workup to elucidate the source(s)     ALCOHOL (OR SEDATIVE-HYPNOTIC) WITHDRAWAL PROTOCOL     >> recommendations provided herein should be seen as rough guidelines, as an alcohol/benzodiazepine withdrawal protocol should be individualized and modified routinely, as clinically warranted  >> the absence of a positive serum or urine toxicology for alcohol does not exclude recent heavy prolonged drinking, as there may have been a delay in the patient  presenting; a withdrawal protocol may still be warranted, based on the clinical situation  >> in the absence of specified contraindications, err on the side of giving more than less benzodiazepine; diazepam is favored for its long half-life, while lorazepam is typically indicated when hepatic metabolism is of utmost concern  >> provide supportive care (fluid and electrolyte replacement; caloric support; vitamin supplementation/repletion)  >> parenteral thiamine is preferred (at least initially), and should be given prior to glucose, to prevent/treat Wernicke-Korsakoff Syndrome  >> vital signs should be frequently assessed and factored into management protocols, taking into account comorbidities and baseline cardiovascular status  >> prototypical threshold levels that indicate significant alcohol/benzodiazepine withdrawal and the need for additional doses of benzodiazepines include systolic BP>160, diastolic BP >100 or HR >110  >> frequent assessment with CIWA-Ar is the standard of care and the hallmark of excellent clinical practice  >> a score of 8+ on CIWA-Ar typically warrants administration of benzodiazepines  >> frequent need for administration of a benzodiazepine or CIWA-Ar scores >15 should trigger possible reclassification of the patient as at higher risk for complicated withdrawal, with concomitant adjustment of the detox protocol  >> if withdrawal does not progress over the first 24-48 hours, CIWA-Ar can be administered less frequently - an interval of four to six hours is reasonable   >> patients with a history of seizures, delirium tremens (or withdrawal delirium), or prolonged heavy alcohol (or sedative-hypnotic) consumption, who are minimally symptomatic or asymptomatic and are admitted to the hospital for other reasons, should typically be prophylactically treated with STANDING benzodiazepines  >> if a fixed-dose regimen is utilized, patients should still be reassessed frequently, and additional doses  of medication given if a score of 8+ is achieved on the CIWA-Ar  >> if such elevated CIWA-Ar scores are reached routinely, prophylaxis is not adequate, and the regimen should be adjusted accordingly  >> if delirium ensues, rule out and treat potential alternative etiologies, even if delirium tremens is likely   >> identify and correct metabolic derangements and volume deficits, and determine if ICU monitoring is warranted  >> in complicated withdrawal, benzodiazepines should be given via IV route whenever possible to assure proper absorption  >> titrate benzodiazepines to effect (ideally light sedation) which may entail the use of massive doses of medication  >> if the patient is incapacitated and the CIWA-Ar cannot be used, instead utilize the Sheridan Agitation-Sedation Scale (RASS) with a goal of 0 to -2  >> in refractory delirium tremens (or withdrawal delirium), phenobarbital or propofol can be used; these agents are preferred to dexmedetomidine as they act directly on the ALFREDO and NMDA receptors, which underlie the derangements seen in alcohol (or sedative-hypnotic) withdrawal  >> intubation may be required in refractory DTs, especially with the use of propofol  >> post-withdrawal treatment is paramount; medically supervised withdrawal manages the patient through the withdrawal symptoms but does not treat alcohol (or sedative-hypnotic) use disorders; patients completing withdrawal are at high risk of relapse to resumed alcohol (or sedative-hypnotic) consumption/use  >> if in a facility, patients should be given explicit plans for follow-up care PRIOR to discharge  >> follow-up may involve ongoing treatment through primary care or a specialized addiction treatment program or physician, and is dependent on a number of factors, including availability and patient willingness  >> continuing care, including pharmacotherapy (e.g., MAT) and psychosocial intervention for alcohol (or sedative-hypnotic) use disorders,  are indicated      CHART REVIEW: available documentation has been reviewed, and pertinent elements of the chart have been incorporated into this evaluation where appropriate.    Rod Villegas MD, PhD  [facility], PGY-2      DIAGNOSTIC TESTING:      Glu 105  11/18/2024  Li *   *  TSH 2.089  10/29/2023    HgA1c 5.3  11/15/2024  VPA *   *   FT4 0.84  10/29/2023    Na 138  11/18/2024  CLZ *   *  WBC 5.07  11/18/2024    Cr 1.1  11/18/2024  ANC 2.8; 54.4;   11/18/2024   Hgb 13.0 (L)  11/18/2024     BUN 7 (L)  11/18/2024  Trop I 0.006  11/14/2024  HCT 38.0 (L)  11/18/2024     GFR >60.0  11/18/2024     11/15/2024   (L)  11/18/2024     Alb 3.7  11/18/2024   PRL *   *  B12 484  10/3/2024     T Bili 0.6  11/18/2024  Chol 123  11/11/2022  B9 *   *    ALP 61  11/18/2024  TGs 213 (H)  11/11/2022  B1 92  *    AST 33  11/18/2024  HDL 43  11/11/2022  Vit D *   *     ALT 20  11/18/2024  LDL 37.4 (L)  11/11/2022  HIV Non-reactive  6/21/2023     INR 1.1  9/20/2024  Corina *   *   Hep C Non-reactive  6/21/2023    GGT *   *  Lip 27  11/14/2024  RPR *   *    MCV 95  11/18/2024   NH4 34  8/4/2024  UPT *   *      PETH *   *  THC Presumptive Positive (A)  6/21/2023    ETOH <10  11/14/2024  JO-ANN Negative  6/21/2023    EtG *   *  AMP Negative  6/21/2023    ALC 68 (A)  11/29/2022  OPI Negative  6/21/2023    BZO Presumptive Positive (A)  6/21/2023  MTD Negative  6/21/2023     BAR Negative  6/21/2023  BUP Not Detected  9/26/2024    PCP Negative  6/21/2023  FEN Not Detected  9/26/2024     Results for orders placed or performed during the hospital encounter of 11/14/24   EKG 12-lead    Collection Time: 11/14/24 11:55 AM   Result Value Ref Range    QRS Duration 82 ms    [facility] QTC Calculation 430 ms    Narrative    Test Reason :    Vent. Rate : 100 BPM     Atrial Rate : 107 BPM     P-R Int :    ms          QRS Dur :  82 ms      QT Int : 334 ms       P-R-T Axes :     -4   -6 degrees    QTcB Int : 430 ms    Baseline Artifact  Atrial fibrillation  Abnormal ECG  When compared with ECG of 14-Nov-2024 11:54,  No significant change was found  Confirmed by Quique Hernandez ([id number]) on 11/14/2024 2:10:16 PM    Referred By: AAAREFERRAL SELF           Confirmed By: Quique Hernandez        WEIR & LINKS:        Y  = yes/endorses     N  = no/denies     U  = unknown/unable to assess    ADHD   AIMS   AUDIT   AUDIT-C   C-SSRS (Screen)   C-SSRS (Short)   C-SSRS (Full)   DAST   DAST-10   DOLORES-7   MoCA   PCL-5   PHQ-9   [place]   YMRS     Consults  [facility] MEDICAL TELEMETRY [facility]STY*    RISK PARAMETERS:     N   Suicidal Ideation/Threats   N   Suicide Attempts/Gestures   N   Homicidal Ideation/Threats   N   Homicidal Behavior   N   Non-Suicidal Self-Injurious Behavior   N   Perpetrated Violence     NOTE: RISK PARAMETERS are current to the encounter/episode  NOT inclusive of past history.     SAFETY SCREENINGS:    -- PROTECTIVE FACTORS: IDENTIFIED       - SPECIFIC FACTORS IDENTIFIED: accessible support, loving attachments, purpose/meaning, social supports, therapeutic alliance    -- RISK FACTORS: IDENTIFIED     - SPECIFIC MODIFIABLE FACTORS IDENTIFIED: substance abuse, intoxication/use     - SPECIFIC NON-MODIFIABLE FACTORS IDENTIFIED: age 59+ years    -- FUTURE ORIENTED: YES  -- REMORSE/REGRET: YES     REGULATORY:      INFORMED CONSENT & SHARED DECISION MAKING are the hallmark and bedrock of good clinical care, and as such have been employed and obtained, respectively, to the degree possible.  Discussed, to the extent possible, diagnosis, risks and benefits of proposed treatment (e.g., medication, therapy) vs alternative treatments vs no treatment, potential side effects of these treatments, and the inherent unpredictability of treatment.  Resources have been provided via the patient instructions in the AVS to supplement, augment, and reinforce discussions,  counseling, and/or interventions.      WARNINGS & PRECAUTIONS:  >> In cases of emergencies (e.g. SI/HI resulting in danger to self or others, functioning deteriorating to the level of grave disability), call 911 or 988, or present to the emergency department for immediate assistance.    >> Individuals should not operate a motor vehicle or heavy machinery if effects of medications or underlying symptoms/condition impair the ability to do so safely.    >> FULLY comply with ANY/ALL medication as prescribed/instructed and report ANY/ALL suspected adverse effects to appropriate health care providers.    HISTORY  DIAGNOSES & PROBLEMS:       1.  Alcohol use disorder, severe    2.  Major neurocognitive disorder, mild    PSYCHOTROPIC REGIMEN:   (C)=Continue as prescribed  (A)=Adjust as noted  (I)=Iniitate  (D)=Discontinue      1.  Benzodiazepine taper (C)    Advance taper as tolerated; though it is equally as important to be prepared to increase doses of benzodiazepines in the short run if withdrawal symptoms worsen of fail to adequately improve    -- ASSESSMENT (synthesis  analysis):     69 year old male with hx of alcohol use disorder, AFib on Eliquis, DM, GERD, Gout, BPH, HLD, and anxiety who presented to hospital for concerns of alcohol withdrawals. Daughter is a nurse and found him at home in possible Afib and short of breath. Psychiatry was consulted for Alcohol use disorder. Admitted with alcohol withdrawal. Interested in quitting. Started on benzodiazepine taper for alcohol withdrawals. Recommend continuing benzodiazepine taper as listed above and monitoring with CIWA precautions until resolution of symptoms. Patient's mental status appears to be improving and he does not report symptoms of alcohol withdrawal at this time. He is interested in [facility] on discharge.     -- PLAN (goals  recommentations):     - CURRENT CONDITION: exacerbated  as compared to typical baseline  - WITH REASONABLE MEDICAL CERTAINTY,  based on history, chart review, available collateral information, and a present-state examination:   -- psychiatric hospitalization is not indicated    * PEC is NOT INDICATED - rescind if in place     >> contingent on accessibility and willingness, patient would benefit from the following referrals/services: outpatient psychiatric services, psychotherapy/counseling, addiction rehab program, mutual self-help groups (e.g. Alcoholics Anonymous, [employer]), and psychiatric intensive outpatient or partial hospital program (IOP/PHP)  >> active alcohol (or sedative-hypnotic) withdrawal is present, including s/sx of complicated withdrawal  >> continue alcohol (or sedative-hypnotic) withdrawal protocol  >> case discussed with staff psychiatrist  >> will sign off at this time     DELIRIUM PROTOCOL      > DELIRIUM is a HIGH RISK MEDICAL CONDITION with significantly elevated rates of morbidity and mortality, routinely rendering a person temporarily incapacitated and interfering with the effective management of underlying medical conditions. As such, proactive and comprehensive management is essential. Implementation of the following recommendations should be considered BEST PRACTICE:  >> if feasible, please utilize non-pharmacologic approaches FIRST for agitation; PRN medications can be considered if this approach is insufficient or ineffective  >> AVOID the use of PHYSICAL RESTRAINTS whenever possible; whenever not, always seek to MINIMIZE their use  >> keep window shades open and room lit during day and room dim at night in order to promote normal sleep-wake cycles  >> reduce unnecessary stimulation/noise; TV screen and sound should be off unless patient is actively engaged with the program  >> minimize disruptions at night  >> correct sensory deficits, when present, with provision of eyeglasses and/or hearing aids  >> optimize nutrition and hydration status  >> continue to manage medical conditions and assess for and  treat pain  >> consider PT/OT consult, as early mobility and exercise have been shown to decrease duration of delirium  >> attempt to maintain consistency of key staff who will routinely interact with the patient  >> encourage family at bedside  >> orient patient often to date, location, and situation, including reason for hospitalization and current plan of care  >> keep whiteboard (or similar) in patient's room current with the date and names of key treatment team members  >> utilize 1:1 sitter for monitoring, if warranted  >> LIMIT or DISCONTINUE the use of narcotics, benzodiazepines, anticholinergics, antihistaminergics, steroids, and other known deliriogenic medications  >> continue treatment of the underlying causative etiology of delirium, the correction of which is the ultimate objective in delirium management; if unknown, continue exhaustive medical workup to elucidate the source(s)     ALCOHOL (OR SEDATIVE-HYPNOTIC) WITHDRAWAL PROTOCOL     >> recommendations provided herein should be seen as rough guidelines, as an alcohol/benzodiazepine withdrawal protocol should be individualized and modified routinely, as clinically warranted  >> the absence of a positive serum or urine toxicology for alcohol does not exclude recent heavy prolonged drinking, as there may have been a delay in the patient presenting; a withdrawal protocol may still be warranted, based on the clinical situation  >> in the absence of specified contraindications, err on the side of giving more than less benzodiazepine; diazepam is favored for its long half-life, while lorazepam is typically indicated when hepatic metabolism is of utmost concern  >> provide supportive care (fluid and electrolyte replacement; caloric support; vitamin supplementation/repletion)  >> parenteral thiamine is preferred (at least initially), and should be given prior to glucose, to prevent/treat Wernicke-Korsakoff Syndrome  >> vital signs should be frequently  assessed and factored into management protocols, taking into account comorbidities and baseline cardiovascular status  >> prototypical threshold levels that indicate significant alcohol/benzodiazepine withdrawal and the need for additional doses of benzodiazepines include systolic BP>160, diastolic BP >100 or HR >110  >> frequent assessment with CIWA-Ar is the standard of care and the hallmark of excellent clinical practice  >> a score of 8+ on CIWA-Ar typically warrants administration of benzodiazepines  >> frequent need for administration of a benzodiazepine or CIWA-Ar scores >15 should trigger possible reclassification of the patient as at higher risk for complicated withdrawal, with concomitant adjustment of the detox protocol  >> if withdrawal does not progress over the first 24-48 hours, CIWA-Ar can be administered less frequently - an interval of four to six hours is reasonable   >> patients with a history of seizures, delirium tremens (or withdrawal delirium), or prolonged heavy alcohol (or sedative-hypnotic) consumption, who are minimally symptomatic or asymptomatic and are admitted to the hospital for other reasons, should typically be prophylactically treated with STANDING benzodiazepines  >> if a fixed-dose regimen is utilized, patients should still be reassessed frequently, and additional doses of medication given if a score of 8+ is achieved on the CIWA-Ar  >> if such elevated CIWA-Ar scores are reached routinely, prophylaxis is not adequate, and the regimen should be adjusted accordingly  >> if delirium ensues, rule out and treat potential alternative etiologies, even if delirium tremens is likely   >> identify and correct metabolic derangements and volume deficits, and determine if ICU monitoring is warranted  >> in complicated withdrawal, benzodiazepines should be given via IV route whenever possible to assure proper absorption  >> titrate benzodiazepines to effect (ideally light sedation) which may  entail the use of massive doses of medication  >> if the patient is incapacitated and the CIWA-Ar cannot be used, instead utilize the [place] Agitation-Sedation Scale (RASS) with a goal of 0 to -2  >> in refractory delirium tremens (or withdrawal delirium), phenobarbital or propofol can be used; these agents are preferred to dexmedetomidine as they act directly on the ALFREDO and NMDA receptors, which underlie the derangements seen in alcohol (or sedative-hypnotic) withdrawal  >> intubation may be required in refractory DTs, especially with the use of propofol  >> post-withdrawal treatment is paramount; medically supervised withdrawal manages the patient through the withdrawal symptoms but does not treat alcohol (or sedative-hypnotic) use disorders; patients completing withdrawal are at high risk of relapse to resumed alcohol (or sedative-hypnotic) consumption/use  >> if in a facility, patients should be given explicit plans for follow-up care PRIOR to discharge  >> follow-up may involve ongoing treatment through primary care or a specialized addiction treatment program or physician, and is dependent on a number of factors, including availability and patient willingness  >> continuing care, including pharmacotherapy (e.g., MAT) and psychosocial intervention for alcohol (or sedative-hypnotic) use disorders, are indicated      See below for pertinent laboratory and radiographic findings.

## 2024-11-18 NOTE — PLAN OF CARE
Problem: Occupational Therapy  Goal: Occupational Therapy Goal  Description: Goals to be met by: 12/18/24     Patient will increase functional independence with ADLs by performing:    UE Dressing with Modified [place].  LE Dressing with Modified [place].  Grooming while standing with Modified [place].  Toileting from toilet with Modified [place] for hygiene and clothing management.   Supine to sit with Modified [place].  Step transfer with Modified [place]  Toilet transfer to toilet with Modified [place].    Outcome: Progressing     OT eval completed.

## 2024-11-18 NOTE — PLAN OF CARE
Problem: Adult Inpatient Plan of Care  Goal: Plan of Care Review  Outcome: Progressing  Goal: Patient-Specific Goal (Individualized)  Outcome: Progressing  Goal: Absence of Hospital-Acquired Illness or Injury  Outcome: Progressing  Goal: Optimal Comfort and Wellbeing  Outcome: Progressing  Goal: Readiness for Transition of Care  Outcome: Progressing     Problem: Fall Injury Risk  Goal: Absence of Fall and Fall-Related Injury  Outcome: Progressing     Problem: Restraint, Nonviolent  Goal: Absence of Harm or Injury  Outcome: Progressing     Problem: Diabetes Comorbidity  Goal: Blood Glucose Level Within Targeted Range  Outcome: Progressing     Problem: Acute Kidney Injury/Impairment  Goal: Fluid and Electrolyte Balance  Outcome: Progressing  Goal: Improved Oral Intake  Outcome: Progressing  Goal: Effective Renal Function  Outcome: Progressing     Problem: Wound  Goal: Optimal Coping  Outcome: Progressing     Pt in bed no acute distress noted at this time. Pt aaox1, was being very combative towards nightshift. Josy monzon was called to floor and security had to help get him back in bed to restrain him. On call md was notified of actions, new orders were given.

## 2024-11-19 LAB
ALBUMIN SERPL BCP-MCNC: 3.7 G/DL (ref 3.5–5.2)
ALP SERPL-CCNC: 64 U/L (ref 40–150)
ALT SERPL W/O P-5'-P-CCNC: 25 U/L (ref 10–44)
ANION GAP SERPL CALC-SCNC: 10 MMOL/L (ref 8–16)
AST SERPL-CCNC: 39 U/L (ref 10–40)
BASOPHILS # BLD AUTO: 0.09 K/UL (ref 0–0.2)
BASOPHILS NFR BLD: 1.5 % (ref 0–1.9)
BILIRUB SERPL-MCNC: 0.4 MG/DL (ref 0.1–1)
BUN SERPL-MCNC: 9 MG/DL (ref 8–23)
CALCIUM SERPL-MCNC: 9.4 MG/DL (ref 8.7–10.5)
CHLORIDE SERPL-SCNC: 101 MMOL/L (ref 95–110)
CO2 SERPL-SCNC: 28 MMOL/L (ref 23–29)
CREAT SERPL-MCNC: 1.1 MG/DL (ref 0.5–1.4)
DIFFERENTIAL METHOD BLD: ABNORMAL
EOSINOPHIL # BLD AUTO: 0.1 K/UL (ref 0–0.5)
EOSINOPHIL NFR BLD: 2.2 % (ref 0–8)
ERYTHROCYTE [DISTWIDTH] IN BLOOD BY AUTOMATED COUNT: 14.3 % (ref 11.5–14.5)
EST. GFR  (NO RACE VARIABLE): >60 ML/MIN/1.73 M^2
GLUCOSE SERPL-MCNC: 106 MG/DL (ref 70–110)
HCT VFR BLD AUTO: 40.5 % (ref 40–54)
HGB BLD-MCNC: 13.1 G/DL (ref 14–18)
IMM GRANULOCYTES # BLD AUTO: 0.02 K/UL (ref 0–0.04)
IMM GRANULOCYTES NFR BLD AUTO: 0.3 % (ref 0–0.5)
LYMPHOCYTES # BLD AUTO: 1.6 K/UL (ref 1–4.8)
LYMPHOCYTES NFR BLD: 27.7 % (ref 18–48)
MAGNESIUM SERPL-MCNC: 1.5 MG/DL (ref 1.6–2.6)
MCH RBC QN AUTO: 32.1 PG (ref 27–31)
MCHC RBC AUTO-ENTMCNC: 32.3 G/DL (ref 32–36)
MCV RBC AUTO: 99 FL (ref 82–98)
MONOCYTES # BLD AUTO: 0.8 K/UL (ref 0.3–1)
MONOCYTES NFR BLD: 13.6 % (ref 4–15)
NEUTROPHILS # BLD AUTO: 3.2 K/UL (ref 1.8–7.7)
NEUTROPHILS NFR BLD: 54.7 % (ref 38–73)
NRBC BLD-RTO: 0 /100 WBC
PHOSPHATE SERPL-MCNC: 3.3 MG/DL (ref 2.7–4.5)
PLATELET # BLD AUTO: 149 K/UL (ref 150–450)
PMV BLD AUTO: 9.2 FL (ref 9.2–12.9)
POCT GLUCOSE: 106 MG/DL (ref 70–110)
POCT GLUCOSE: 127 MG/DL (ref 70–110)
POCT GLUCOSE: 130 MG/DL (ref 70–110)
POCT GLUCOSE: 150 MG/DL (ref 70–110)
POCT GLUCOSE: 161 MG/DL (ref 70–110)
POTASSIUM SERPL-SCNC: 4.2 MMOL/L (ref 3.5–5.1)
PROT SERPL-MCNC: 6.6 G/DL (ref 6–8.4)
RBC # BLD AUTO: 4.08 M/UL (ref 4.6–6.2)
SODIUM SERPL-SCNC: 139 MMOL/L (ref 136–145)
WBC # BLD AUTO: 5.89 K/UL (ref 3.9–12.7)

## 2024-11-19 PROCEDURE — 84100 ASSAY OF PHOSPHORUS: CPT | Mod: HCNC

## 2024-11-19 PROCEDURE — 63600175 PHARM REV CODE 636 W HCPCS: Mod: HCNC | Performed by: HOSPITALIST

## 2024-11-19 PROCEDURE — 25000003 PHARM REV CODE 250: Mod: HCNC

## 2024-11-19 PROCEDURE — 25000003 PHARM REV CODE 250: Mod: HCNC | Performed by: HOSPITALIST

## 2024-11-19 PROCEDURE — 83735 ASSAY OF MAGNESIUM: CPT | Mod: HCNC

## 2024-11-19 PROCEDURE — 36415 COLL VENOUS BLD VENIPUNCTURE: CPT | Mod: HCNC

## 2024-11-19 PROCEDURE — 85025 COMPLETE CBC W/AUTO DIFF WBC: CPT | Mod: HCNC

## 2024-11-19 PROCEDURE — 80053 COMPREHEN METABOLIC PANEL: CPT | Mod: HCNC

## 2024-11-19 PROCEDURE — 97530 THERAPEUTIC ACTIVITIES: CPT | Mod: HCNC

## 2024-11-19 PROCEDURE — 97535 SELF CARE MNGMENT TRAINING: CPT | Mod: HCNC

## 2024-11-19 PROCEDURE — 63600175 PHARM REV CODE 636 W HCPCS: Mod: HCNC

## 2024-11-19 PROCEDURE — 20600001 HC STEP DOWN PRIVATE ROOM: Mod: HCNC

## 2024-11-19 RX ORDER — MAGNESIUM SULFATE HEPTAHYDRATE 40 MG/ML
2 INJECTION, SOLUTION INTRAVENOUS ONCE
Status: COMPLETED | OUTPATIENT
Start: 2024-11-19 | End: 2024-11-19

## 2024-11-19 RX ORDER — FOLIC ACID 1 MG/1
1 TABLET ORAL DAILY
Qty: 30 TABLET | Refills: 11 | Status: SHIPPED | OUTPATIENT
Start: 2024-11-19 | End: 2025-11-19

## 2024-11-19 RX ORDER — LANOLIN ALCOHOL/MO/W.PET/CERES
100 CREAM (GRAM) TOPICAL DAILY
Qty: 30 TABLET | Refills: 2 | Status: SHIPPED | OUTPATIENT
Start: 2024-11-19

## 2024-11-19 RX ADMIN — DIAZEPAM 5 MG: 5 TABLET ORAL at 08:11

## 2024-11-19 RX ADMIN — DRONEDARONE 400 MG: 400 TABLET, FILM COATED ORAL at 08:11

## 2024-11-19 RX ADMIN — APIXABAN 5 MG: 5 TABLET, FILM COATED ORAL at 08:11

## 2024-11-19 RX ADMIN — PANTOPRAZOLE SODIUM 40 MG: 40 TABLET, DELAYED RELEASE ORAL at 08:11

## 2024-11-19 RX ADMIN — FOLIC ACID 1 MG: 1 TABLET ORAL at 08:11

## 2024-11-19 RX ADMIN — MUPIROCIN: 20 OINTMENT TOPICAL at 08:11

## 2024-11-19 RX ADMIN — MAGNESIUM SULFATE 2 G: 2 INJECTION INTRAVENOUS at 08:11

## 2024-11-19 RX ADMIN — THERA TABS 1 TABLET: TAB at 08:11

## 2024-11-19 RX ADMIN — THIAMINE HYDROCHLORIDE 250 MG: 100 INJECTION, SOLUTION INTRAMUSCULAR; INTRAVENOUS at 08:11

## 2024-11-19 NOTE — PT/OT/SLP PROGRESS
Occupational Therapy   Treatment    Name: Donald Warren Abadie  MRN: [id number]  Admitting Diagnosis:  Alcohol withdrawal syndrome with complication       Recommendations:     Discharge Recommendations: Low Intensity Therapy  Discharge Equipment Recommendations:  none  Barriers to discharge:  Decreased caregiver support    Assessment:     Donald Warren Abadie is a 69 y.o. male with a medical diagnosis of Alcohol withdrawal syndrome with complication.  He presents with good participation and motivation. Performance deficits affecting function are weakness, impaired endurance, impaired self care skills, impaired functional mobility, impaired balance, decreased upper extremity function, decreased lower extremity function, orthopedic precautions.     Rehab Prognosis:  Good; patient would benefit from acute skilled OT services to address these deficits and reach maximum level of function.       Plan:     Patient to be seen 3 x/week to address the above listed problems via self-care/home management, therapeutic activities, therapeutic exercises  Plan of Care Expires: 12/19/24  Plan of Care Reviewed with: patient    Subjective     Chief Complaint: none stated  Patient/Family Comments/goals: Pt voiced that he wanted to walk to all the window on the floor (8th floor).  Pain/Comfort:  Pain Rating 1: 0/10  Pain Rating Post-Intervention 1: 0/10    Objective:     Communicated with: RN prior to session.  Patient found supine with telemetry, peripheral IV (no family present) upon OT entry to room.    General Precautions: Standard, fall, hearing impaired, seizure    Orthopedic Precautions:RLE weight bearing as tolerated  Braces:  (tall CAM boot)  Respiratory Status: Room air     Occupational Performance:     Bed Mobility:    Patient completed Supine to Sit with minimum assistance     Functional Mobility/Transfers:  Patient completed Sit <> Stand Transfer with stand by assistance  with  rolling walker from EOB  Functional Mobility: SBA  using RW with functional mobility to bathroom & back during ADL's in room & then around unit for endurance & balance training with SBA using RW    Activities of Daily Living:  Grooming: stand by assistance with grooming while standing at bathroom sink using RW  Lower Body Dressing: supervision donning shoe to LLE & assistance to don CAM boot to RLE while seated EOB      [facility] 6 Click ADL: 19    Treatment & Education:  Provided education on safety during transfers (hand placement when using RW).  Provided education on need for (A) with OOB activities from staff when he needs to get up from chair.  PCT to find chair alarm box for chair alarm.  [employer] camera staff called & they were able to see pt in chair.  Pt had no further questions & when asked whether there were any concerns pt reported none.      Patient left up in chair with all lines intact, call button in reach, and RN notified    GOALS:   Multidisciplinary Problems       Occupational Therapy Goals          Problem: Occupational Therapy    Goal Priority Disciplines Outcome Interventions   Occupational Therapy Goal     OT, PT/OT Progressing    Description: Goals to be met by: 12/18/24     Patient will increase functional independence with ADLs by performing:    UE Dressing with Modified [place].  LE Dressing with Modified [place].  Grooming while standing with Modified [place].  Toileting from toilet with Modified [place] for hygiene and clothing management.   Supine to sit with Modified [place].  Step transfer with Modified [place]  Toilet transfer to toilet with Modified [place].                         Time Tracking:     OT Date of Treatment: 11/19/24  OT Start Time: 1034  OT Stop Time: 1107  OT Total Time (min): 33 min    Billable Minutes:Self Care/Home Management 15  Therapeutic Activity 18    OT/JUAN DIEGO: OT          11/19/2024

## 2024-11-19 NOTE — ASSESSMENT & PLAN NOTE
non aniongap  . patient with bicarbonate   Recent Labs   Lab 11/17/24  0853 11/18/24  0413 11/19/24  0339   CO2 27 28 28      .started on bicarbonate drip x 5h. resolved

## 2024-11-19 NOTE — DISCHARGE SUMMARY
[facility] - Telemetry [facility] Medicine  Discharge Summary      Patient Name: Donald Warren Abadie  MRN: [id number]  [facility]: [id number]  Patient Class: IP- Inpatient  Admission Date: 11/14/2024  Hospital Length of Stay: 6 days  Discharge Date and Time:  11/20/2024 7:50 AM  Attending Physician: Mehdi Ramey MD   Discharging Provider: Mehdi Ramey MD  Primary Care Provider: Bacilio Larry MD  Hospital Medicine Team: [facility] MED  Mehdi Ramey MD  Primary Care Team: [facility]    HPI:   Donald Abadie is a 69-year-old male with a past medical history of Afib, gout, anxiety, left sided RCC s/p partial nephrectomy (2014), HTN, HLD, subdural hematoma (9/20/2024), and alcoholism who presents to the ED with concern of alcohol withdrawal. He has been trying to cut down on his drinking after a recent fall with a head injury and subdural but typically drinks about 5-6 beers daily. His daughter came to his house because he was short of breath and was complaining that his heart was racing. She is a nurse and found him in Afib with elevated rate. He fell the night prior to admission as well. She is worried about him being able to detox at home. He states that he is feeling short of breath and anxious. He had minimal confusion at the time of arrival to the ED.     In the ED, , Na 122, Cl 90, CO2 20, glucose 130, BUN 6, Creatinine 1.6, T.Bili 1.6, AST 67, Magnesium 1.3. CT head with no evidence of acute intracranial hemorrhage. At the time of MICU evaluation the patient is tachycardic, tachypneic, and diaphoretic despite 4 mg IV ativan and 5 mg PO diazepam. He exhibits hallucinations and nonsensical speech upon evaluation.         * No surgery found *      Hospital Course:     The patient was admitted to hospital medicine for further management of his acute alcohol withdrawals. He was started on scheduled and as needed benzodiazepines as well as a precedex gtt. His hyponatremia was trended and  treated with IV fluids and holding his home SSRI. His withdrawal symptoms improved and precedex was weaned off. He was continued on a valium taper with PRN ativan as needed.     No acute overnight events. Patient only required 1 dose of PRN ativan 2 mg for CIWA > 8. Precedex gtt weaning.    11/17 Transfer to \[facility]\"" medicine  [facility] of A-fib, gout, anxiety, h/o RCC s/p partial nephrectomy (2014), HTN, HLD, subdural hematoma in September 2024 presenting with acute alcohol withdrawals. Initially required a precedex gtt on top of scheduled and as needed benzos, now doing well on scheduled benzo taper with additional PRN ativan driven by CIWA. Continue diazepam taper with  PRN ativan for breakthrough CIWA scoring. RUQ US ordered. no historical cirrhosis but his mild hyponatremia is concerning for cirrhosis w/ his alcohol history. given bicarb drip for nonanion gap metabolic acidosis. Addiction psychiatry consulted. recurrent falls. PT/OT consulted. EP follow up to discuss frequent falls while on Eliquis.  not had any ischemic workup since his EF became mildly reduced sometime in 2021. recent neuropsychiatry eval - needs help with most IADLs, indicating a diagnosis of Major Neurocognitive Disorder/dementia, mild stage likely with severe alcohol use . encourage alcohol cessation. CT head 11/14 -No evidence of acute intracranial hemorrhage as above.  orthopedic f/u 9/17 -acute nondisplaced long spiral fracture of the lateral malleolus at the level of the syndesmosis. Nondisplaced avulsion fracture involving the tip of the medial malleolus. Ankle mortise remains congruent on weight-bearing stress x-rays. orthopedics consulted for weight bearing status . orthostatic hypotension +. continue gentle LR hydration .sodium improved to 135. K and Mg replaced   11/18 agitated overnight - combative with nursing staff.  given ativan/haldol and wrist restraints but later was able to get out of restraints and out of the room. psychiatry  "follow up  . AAOX 3 this AM  11/19 Mg replaced.   11/20 completed valium taper. Daughter plans to take patient to alcohol rehab  at [facility].  PT recs       Goals of Care Treatment Preferences:  Code Status: Full Code         Consults:   Consults (From admission, onward)          Status Ordering Provider     Inpatient consult to Psychiatry  Once        Provider:  (Not yet assigned)    Completed KATLYN SNYDER     Inpatient consult to Critical Care Medicine  Once        Provider:  (Not yet assigned)    Completed BJORN VERMA              Assessment/Plan:      * Alcohol withdrawal syndrome with complication      presented to ED with c/o shortness of breath and "heart racing". Has been trying to cut back on alcohol use. Now in alcohol withdrawals.      11/15: Patient required 14 mg PRN ativan since admission in addition to scheduled diazepam. Plan to increase scheduled diazepam, continue titrating PRN ativan and precedex gtt.  11/16: Precedex weaned off, required 1 dose of PRN ativan overnight.     -- Diazepam taper: 10 mg TID -> 10 mg BID -> 5 mg BID -> 5 mg daily -> stop  -- Continue PRN ativan 2 mg for CIWA > 8  -- Stop precedex gtt  --Daily thiamine, folic acid, multivitamin  --CIWA protocol q4hrs     11/18 agitated overnight - combative with nursing staff. AAO x 2  -  given ativan/haldol and wrist restraints but later was able to get out of restraints and out of the room. psychiatry follow up          Closed fracture of right ankle with routine healing  11/17   orthopedic f/u 9/17 -acute nondisplaced long spiral fracture of the lateral malleolus at the level of the syndesmosis. Nondisplaced avulsion fracture involving the tip of the medial malleolus. Ankle mortise remains congruent on weight-bearing stress x-rays. orthopedics consulted for weight bearing status      orthopedic evaluation - Recommend patient wear his tall cam boot at ALL times while weight-bearing. WBAT in boot. Utilize a " walker when ambulating, recommend working with PT.         Orthostatic hypotension     11/16 SBP 70/80s on sitting up in the chair, improved to 99/55 on lying down.  IV hydration   11/17 BP improved , orthostatic hypotension +. continue gentle LR hydration      Falls frequently  11/16 daughter reports 6 falls recently.  x ray shoulder right 8/24 -anterior inferior shoulder dislocation with impaction in Hill-Sachs deformity on the humeral head. No definite fracture.  Xray right foot 9/14 -Mildly displaced distal fibular fracture -      PT/OT eval -likely needs SNF        11/17  EP follow up to discuss frequent falls while on Eliquis.       Thrombocytopenia  The likely etiology of thrombocytopenia is alcohol abuse. monitor         Recent Labs     11/16/24  0503 11/17/24  0853 11/18/24  0413   PLT 95* 117* 121*            Heart failure with mildly reduced ejection fraction (HFmrEF)     Results for orders placed during the hospital encounter of 11/14/24  Echo  Interpretation Summary    Left Ventricle: The left ventricle is normal in size. Normal wall thickness. There is concentric remodeling. There is low normal systolic function with a visually estimated ejection fraction of 50 - 55%. There is normal diastolic function.    Right Ventricle: Normal right ventricular cavity size. Wall thickness is normal. Systolic function is normal.    Mild biatrial enlargement    Aortic Valve: The aortic valve is a trileaflet valve. There is mild aortic valve sclerosis.    Pulmonary Artery: The estimated pulmonary artery systolic pressure is 25 mmHg.    IVC/SVC: Normal venous pressure at 3 mmHg.     -- No evidence of acute exacerbation        11/17   not had any ischemic workup since his EF became mildly reduced sometime in 2021.      Primary hypertension  , controlled.  Latest blood pressure and vitals reviewed-   Temp:  [97 °F (36.1 °C)-98.3 °F (36.8 °C)]   Pulse:  [51-68]   Resp:  [14-31]   BP: ()/(41-79)   SpO2:  [98 %-100 %]  .   not on medications     PRN meds if BP> 180/110 mm HG      Hypomagnesemia  replaced       Recent Labs   Lab 11/19/24  0339   MG 1.5*            Metabolic acidosis  non aniongap  . patient with bicarbonate         Recent Labs   Lab 11/17/24  0853 11/18/24  0413 11/19/24  0339   CO2 27 28 28       .started on bicarbonate drip x 5h. resolved         Type 2 diabetes mellitus without complication, without long-term current use of insulin  Patient's FSGs are controlled on current hypoglycemics.   Last A1c reviewed-         Lab Results   Component Value Date     HGBA1C 5.3 11/15/2024     HGBA1C 5.9 (H) 02/29/2024     HGBA1C 8.0 (H) 10/29/2023      Will hold PO hypoglycemics and will start correctional scale insulin  Most recent fingerstick glucose reviewed-          Recent Labs   Lab 11/17/24  1703 11/17/24  1845 11/17/24  2128 11/18/24  0816   POCTGLUCOSE 52* 115* 154* 113*         currently on   Antihyperglycemics (From admission, onward)        Start     Stop Route Frequency Ordered     11/14/24 1554   insulin aspart U-100 pen 0-5 Units         -- SubQ Before meals & nightly PRN 11/14/24 1455                Hyponatremia           Recent Labs     11/17/24  0853 11/18/24  0413 11/19/24  0339   * 138 139      Na 122 on admission. Serum osm 264, Urine osm 409, Urine Na 30. Not fully explained by alcohol use / beer potomania, resolved now     Na improving but remains hyponatremic. Will check RUQ US to evaluate for cirrhosis given alcohol use history.     --Daily CMP  -- Hold SSRI  -- Follow-up RU US        Paroxysmal atrial fibrillation     Hx of atrial fibrillation   --Continue home dronedarone  -- Continue home eliquis  -- Telemetry        11/17 EP follow up to discuss frequent falls while on Eliquis.          Final Active Diagnoses:    Diagnosis Date Noted POA    PRINCIPAL PROBLEM:  Alcohol withdrawal syndrome with complication [F10.939] 05/22/2014 Yes    Closed fracture of right ankle with routine healing  [S82.891D] 11/17/2024 Not Applicable    Thrombocytopenia [D69.6] 11/16/2024 Yes    Falls frequently [R29.6] 11/16/2024 Not Applicable    Orthostatic hypotension [I95.1] 11/16/2024 Yes    Primary hypertension [I10] 11/07/2024 Yes    Heart failure with mildly reduced ejection fraction (HFmrEF) [I50.22] 11/07/2024 Yes    Hypomagnesemia [E83.42] 11/29/2022 Yes    Metabolic acidosis [E87.20] 11/29/2022 Yes    Type 2 diabetes mellitus without complication, without long-term current use of insulin [E11.9] 10/07/2019 Yes     Chronic    Hyponatremia [E87.1] 03/15/2019 Yes    Paroxysmal atrial fibrillation [I48.0] 05/22/2014 Yes      Problems Resolved During this Admission:    Diagnosis Date Noted Date Resolved POA    JASS (acute kidney injury) [N17.9] 04/02/2024 11/16/2024 Yes       Discharged Condition: fair    Disposition: Home-Health Care [facility]     Follow Up:    Patient Instructions:   No discharge procedures on file.    Significant Diagnostic Studies: Labs: BMP:   Recent Labs   Lab 11/19/24 0339 11/20/24 0427    116*    135*   K 4.2 4.0    102   CO2 28 24   BUN 9 12   CREATININE 1.1 1.1   CALCIUM 9.4 9.6   MG 1.5* 1.6   , CMP   Recent Labs   Lab 11/19/24 0339 11/20/24 0427    135*   K 4.2 4.0    102   CO2 28 24    116*   BUN 9 12   CREATININE 1.1 1.1   CALCIUM 9.4 9.6   PROT 6.6 6.6   ALBUMIN 3.7 3.7   BILITOT 0.4 0.4   ALKPHOS 64 58   AST 39 36   ALT 25 26   ANIONGAP 10 9   , CBC   Recent Labs   Lab 11/19/24 0339 11/20/24 0427   WBC 5.89 5.35   HGB 13.1* 13.5*   HCT 40.5 37.8*   * 147*   , INR   Lab Results   Component Value Date    INR 1.1 09/20/2024    INR 1.0 10/28/2023    INR 1.0 10/07/2019   , Lipid Panel   Lab Results   Component Value Date    CHOL 123 11/11/2022    HDL 43 11/11/2022    LDLCALC 37.4 (L) 11/11/2022    TRIG 213 (H) 11/11/2022    CHOLHDL 35.0 11/11/2022   , Troponin   Recent Labs   Lab 11/14/24  1152 11/14/24  1429   TROPONINI 0.006 0.006   ,  A1C:   Recent Labs   Lab 11/15/24  0352   HGBA1C 5.3   , and All labs within the past 24 hours have been reviewed  Microbiology: Blood Culture   Lab Results   Component Value Date    LABBLOO No growth after 5 days. 03/21/2019   , Sputum Culture   Lab Results   Component Value Date    GSRESP Rare WBC's 03/21/2019    GSRESP No organisms seen 03/21/2019    RESPIRATORYC No growth 03/21/2019   , and Urine Culture    Lab Results   Component Value Date    LABURIN No significant growth 04/24/2024     US Abdomen Limited  Narrative: EXAMINATION:  US ABDOMEN LIMITED    CLINICAL HISTORY:  evaluation for cirrhosis, RUQ ultrasound;.    TECHNIQUE:  Limited ultrasound of the right upper quadrant of the abdomen including pancreas, liver, gallbladder, common bile duct was performed.    COMPARISON:  Ultrasound 10/09/2024, CT 05/02/2024    FINDINGS:  Pancreas: The visualized portions of pancreas appear normal.    Liver: Normal in size, measuring 17.4 cm. Homogeneous echotexture. No focal hepatic lesions. Hepatorenal index 1.1.    Gallbladder: Cholelithiasis.  No gallbladder wall thickening, pericholecystic fluid or sonographic Steel sign.    Biliary system: The common duct is not dilated, measuring 3 mm.  No intrahepatic ductal dilatation.    Spleen: Normal in size with a heterogeneous echotexture, measuring 10.6 x 6.1 cm.    Miscellaneous: No ascites.  Impression: Cholelithiasis without secondary signs of cholecystitis.    Electronically signed by resident: Jamel Villalpando  Date:    11/17/2024  Time:    08:04    Electronically signed by: Quique Merino Jr  Date:    11/17/2024  Time:    11:12       Pending Diagnostic Studies:       Procedure Component Value Units Date/Time    CBC auto differential [[id number]] Collected: 11/17/24 0853    Order Status: Sent Lab Status: No result     Specimen: Blood     Comprehensive metabolic panel [[id number]] Collected: 11/17/24 0853    Order Status: Sent Lab Status: No result     Specimen:  Blood     EKG 12-lead [[id number]]     Order Status: Sent Lab Status: No result     Magnesium [[id number]] Collected: 11/17/24 0853    Order Status: Sent Lab Status: No result     Specimen: Blood     Phosphorus [[id number]] Collected: 11/17/24 0853    Order Status: Sent Lab Status: No result     Specimen: Blood            Medications:  Reconciled Home Medications:      Medication List        CHANGE how you take these medications      ELIQUIS 5 mg Tab  Generic drug: apixaban  TAKE 1 TABLET BY MOUTH TWICE DAILY  What changed: how much to take     rOPINIRole 1 MG tablet  Commonly known as: REQUIP  TAKE 1 TABLET(1 MG) BY MOUTH EVERY EVENING  What changed:   when to take this  reasons to take this     thiamine 100 MG tablet  Commonly known as: VITAMIN B-1  Take 1 tablet (100 mg total) by mouth once daily.  What changed: when to take this            CONTINUE taking these medications      allopurinoL 100 MG tablet  Commonly known as: ZYLOPRIM  TAKE 2 TABLETS(200 MG) BY MOUTH EVERY DAY     cyanocobalamin 1000 MCG tablet  Commonly known as: VITAMIN B-12  Take 1 tablet (1,000 mcg total) by mouth once daily.     diclofenac sodium 1 % Gel  Commonly known as: VOLTAREN  Apply 2 g topically 3 (three) times daily as needed (Right knee - hand pain).     dronedarone 400 mg Tab  Commonly known as: MULTAQ  Take 1 tablet (400 mg total) by mouth once daily.     folic acid 1 MG tablet  Commonly known as: FOLVITE  Take 1 tablet (1 mg total) by mouth once daily.     pantoprazole 40 MG tablet  Commonly known as: PROTONIX  Take 1 tablet (40 mg total) by mouth once daily.     rosuvastatin 20 MG tablet  Commonly known as: CRESTOR  TAKE 1 TABLET(20 MG) BY MOUTH EVERY DAY     sertraline 100 MG tablet  Commonly known as: ZOLOFT  Take 1 tablet (100 mg total) by mouth once daily.            STOP taking these medications      tamsulosin 0.4 mg Cap  Commonly known as: FLOMAX              Indwelling Lines/Drains at time of discharge:    Lines/Drains/Airways       Drain  Duration             Male External Urinary Catheter 11/14/24 1800 4 days                  40 minutes of time spent on discharge, including examining the patient, providing discharge instructions, arranging   follow up and documentation        Mehdi Ramey MD  Attending Staff Physician  [facility] Medicine  pager- [phone] Rsck[facility]ry - [phone]

## 2024-11-19 NOTE — PLAN OF CARE
Patient in bed, no complaints voiced at this time, safety measures in place, call light in reach, NADN, POC ongoing    Problem: Adult Inpatient Plan of Care  Goal: Plan of Care Review  Outcome: Progressing  Goal: Patient-Specific Goal (Individualized)  Outcome: Progressing  Goal: Absence of Hospital-Acquired Illness or Injury  Outcome: Progressing  Goal: Optimal Comfort and Wellbeing  Outcome: Progressing  Goal: Readiness for Transition of Care  Outcome: Progressing     Problem: Fall Injury Risk  Goal: Absence of Fall and Fall-Related Injury  Outcome: Progressing     Problem: Restraint, Nonviolent  Goal: Absence of Harm or Injury  Outcome: Progressing     Problem: Acute Kidney Injury/Impairment  Goal: Fluid and Electrolyte Balance  Outcome: Progressing  Goal: Improved Oral Intake  Outcome: Progressing  Goal: Effective Renal Function  Outcome: Progressing     Problem: Wound  Goal: Optimal Coping  Outcome: Progressing  Goal: Optimal Functional Ability  Outcome: Progressing  Goal: Absence of Infection Signs and Symptoms  Outcome: Progressing  Goal: Improved Oral Intake  Outcome: Progressing  Goal: Optimal Pain Control and Function  Outcome: Progressing  Goal: Skin Health and Integrity  Outcome: Progressing  Goal: Optimal Wound Healing  Outcome: Progressing     Problem: Skin Injury Risk Increased  Goal: Skin Health and Integrity  Outcome: Progressing

## 2024-11-19 NOTE — PROGRESS NOTES
[facility]  Progress Note    Patient Name: Donald Warren Abadie  MRN: [id number]  Patient Class: IP- Inpatient   Admission Date: 11/14/2024  Length of Stay: 5 days  Attending Physician: Mehdi Ramey MD  Primary Care Provider: Bacilio Larry MD        Subjective:     Principal Problem:Alcohol withdrawal syndrome with complication        HPI:  Donald Abadie is a 69-year-old male with a past medical history of Afib, gout, anxiety, left sided RCC s/p partial nephrectomy (2014), HTN, HLD, subdural hematoma (9/20/2024), and alcoholism who presents to the ED with concern of alcohol withdrawal. He has been trying to cut down on his drinking after a recent fall with a head injury and subdural but typically drinks about 5-6 beers daily. His daughter came to his house because he was short of breath and was complaining that his heart was racing. She is a nurse and found him in Afib with elevated rate. He fell the night prior to admission as well. She is worried about him being able to detox at home. He states that he is feeling short of breath and anxious. He had minimal confusion at the time of arrival to the ED.     In the ED, , Na 122, Cl 90, CO2 20, glucose 130, BUN 6, Creatinine 1.6, T.Bili 1.6, AST 67, Magnesium 1.3. CT head with no evidence of acute intracranial hemorrhage. At the time of MICU evaluation the patient is tachycardic, tachypneic, and diaphoretic despite 4 mg IV ativan and 5 mg PO diazepam. He exhibits hallucinations and nonsensical speech upon evaluation.         Overview/Hospital Course:    The patient was admitted to hospital medicine for further management of his acute alcohol withdrawals. He was started on scheduled and as needed benzodiazepines as well as a precedex gtt. His hyponatremia was trended and treated with IV fluids and holding his home SSRI. His withdrawal symptoms improved and precedex was weaned off. He was continued on a valium taper with  PRN ativan as needed.     No acute overnight events. Patient only required 1 dose of PRN ativan 2 mg for CIWA > 8. Precedex gtt weaning.    11/17 Transfer to [facility] medicine  [facility] of A-fib, gout, anxiety, h/o RCC s/p partial nephrectomy (2014), HTN, HLD, subdural hematoma in September 2024 presenting with acute alcohol withdrawals. Initially required a precedex gtt on top of scheduled and as needed benzos, now doing well on scheduled benzo taper with additional PRN ativan driven by CIWA. Continue diazepam taper with  PRN ativan for breakthrough CIWA scoring. RUQ US ordered. no historical cirrhosis but his mild hyponatremia is concerning for cirrhosis w/ his alcohol history. given bicarb drip for nonanion gap metabolic acidosis. Addiction psychiatry consulted. recurrent falls. PT/OT consulted. EP follow up to discuss frequent falls while on Eliquis.  not had any ischemic workup since his EF became mildly reduced sometime in 2021. recent neuropsychiatry eval - needs help with most IADLs, indicating a diagnosis of Major Neurocognitive Disorder/dementia, mild stage likely with severe alcohol use . encourage alcohol cessation. CT head 11/14 -No evidence of acute intracranial hemorrhage as above.  orthopedic f/u 9/17 -acute nondisplaced long spiral fracture of the lateral malleolus at the level of the syndesmosis. Nondisplaced avulsion fracture involving the tip of the medial malleolus. Ankle mortise remains congruent on weight-bearing stress x-rays. orthopedics consulted for weight bearing status . orthostatic hypotension +. continue gentle LR hydration .sodium improved to 135. K and Mg replaced   11/18 agitated overnight - combative with nursing staff.  given ativan/haldol and wrist restraints but later was able to get out of restraints and out of the room. psychiatry follow up  . AAOX 3 this AM  11/19 Mg replaced. PT recs          Review of Systems:   Pain scale:   Constitutional:  fever,  chills, headache, vision  loss, hearing loss, weight loss, Generalized weakness, falls, loss of smell, loss of taste, poor appetite,  sore throat  Respiratory: cough, shortness of breath.   Cardiovascular: chest pain, dizziness, palpitations, orthopnea, swelling of feet, syncope  Gastrointestinal: nausea, vomiting, abdominal pain, diarrhea, black stool,  blood in stool, change in bowel habits, constipation  Genitourinary: hematuria, dysuria, urgency, frequency  Integument/Breast: rash,  pruritis  Hematologic/Lymphatic: easy bruising, lymphadenopathy  Musculoskeletal: arthralgias , myalgias, back pain, neck pain, knee pain  Neurological: confusion -improved , seizures, tremors, slurred speech  Behavioral/Psych:  depression, anxiety, auditory or visual hallucinations     OBJECTIVE:     Physical Exam:  Body mass index is 33.15 kg/m².    Constitutional: Appears well-developed and well-nourished. obesity  Head: Normocephalic and atraumatic.   Neck: Normal range of motion. Neck supple.   Cardiovascular: Normal heart rate.  Regular heart rhythm.  Pulmonary/Chest: Effort normal.   Abdominal: No distension.  No tenderness  Musculoskeletal: Normal range of motion. No edema.   Neurological: Alert and oriented to person, place, and time. able to move bilateral upper and lower extremities without limitation   Skin: Skin is warm and dry.   Psychiatric: Normal mood and affect. Behavior is normal.                  Vital Signs  Temp: 98.1 °F (36.7 °C) (11/19/24 1128)  Pulse: 67 (11/19/24 1128)  Resp: 19 (11/19/24 1128)  BP: 118/75 (11/19/24 1128)  SpO2: 99 % (11/19/24 1128)     24 Hour VS Range    Temp:  [97.6 °F (36.4 °C)-98.1 °F (36.7 °C)]   Pulse:  [58-94]   Resp:  [18-19]   BP: (100-152)/(53-84)   SpO2:  [97 %-100 %]     Intake/Output Summary (Last 24 hours) at 11/19/2024 1232  Last data filed at 11/19/2024 1038  Gross per 24 hour   Intake --   Output 400 ml   Net -400 ml           I/O This Shift:  I/O this shift:  In: -   Out: 400 [Urine:400]    Wt  "Readings from Last 3 Encounters:   11/15/24 77 kg (169 lb 12.1 oz)   11/07/24 77.5 kg (170 lb 13.7 oz)   10/03/24 78.4 kg (172 lb 13.5 oz)       I have personally reviewed the vitals and recorded Intake/Output     Laboratory/Diagnostic Data:    CBC/Anemia Labs: Coags:    Recent Labs   Lab 11/17/24  0853 11/18/24  0413 11/19/24  0339   WBC 5.62 5.07 5.89   HGB 13.8* 13.0* 13.1*   HCT 40.3 38.0* 40.5   * 121* 149*   MCV 96 95 99*   RDW 13.7 14.0 14.3    No results for input(s): "PT", "INR", "APTT" in the last 168 hours.     Chemistries: ABG:   Recent Labs   Lab 11/16/24  0503 11/16/24  1118 11/17/24  0853 11/18/24  0413 11/19/24  0339   *   < > 135* 138 139   K 4.6   < > 3.3* 3.3* 4.2      < > 97 101 101   CO2 21*   < > 27 28 28   BUN 9   < > 6* 7* 9   CREATININE 1.0   < > 1.0 1.1 1.1   CALCIUM 8.9   < > 9.1 9.2 9.4   PROT 6.2  --  6.6 6.6 6.6   BILITOT 1.2*  --  1.0 0.6 0.4   ALKPHOS 63  --  68 61 64   ALT 22  --  21 20 25   AST 38  --  33 33 39   MG 1.5*  --  1.4* 1.7 1.5*   PHOS 2.8  --   --  3.0 3.3    < > = values in this interval not displayed.    No results for input(s): "PH", "PCO2", "PO2", "HCO3", "POCSATURATED", "BE" in the last 168 hours.     POCT Glucose: HbA1c:    Recent Labs   Lab 11/18/24  0816 11/18/24  1114 11/18/24  1305 11/18/24  1700 11/18/24 2022 11/19/24  0825   POCTGLUCOSE 113* 224* 112* 117* 110 130*    Hemoglobin A1C   Date Value Ref Range Status   11/15/2024 5.3 4.0 - 5.6 % Final     Comment:     ADA Screening Guidelines:  5.7-6.4%  Consistent with prediabetes  >or=6.5%  Consistent with diabetes    High levels of fetal hemoglobin interfere with the HbA1C  assay. Heterozygous hemoglobin variants (HbS, HgC, etc)do  not significantly interfere with this assay.   However, presence of multiple variants may affect accuracy.     02/29/2024 5.9 (H) 4.0 - 5.6 % Final     Comment:     ADA Screening Guidelines:  5.7-6.4%  Consistent with prediabetes  >or=6.5%  Consistent with " "diabetes    High levels of fetal hemoglobin interfere with the HbA1C  assay. Heterozygous hemoglobin variants (HbS, HgC, etc)do  not significantly interfere with this assay.   However, presence of multiple variants may affect accuracy.     10/29/2023 8.0 (H) 4.0 - 5.6 % Final     Comment:     ADA Screening Guidelines:  5.7-6.4%  Consistent with prediabetes  >or=6.5%  Consistent with diabetes    High levels of fetal hemoglobin interfere with the HbA1C  assay. Heterozygous hemoglobin variants (HbS, HgC, etc)do  not significantly interfere with this assay.   However, presence of multiple variants may affect accuracy.          Cardiac Enzymes: Ejection Fractions:    No results for input(s): "CPK", "CPKMB", "MB", "TROPONINI" in the last 72 hours.   EF   Date Value Ref Range Status   11/30/2022 45 % Final   11/01/2021 45 % Final          No results for input(s): "COLORU", "APPEARANCEUA", "PHUR", "SPECGRAV", "PROTEINUA", "GLUCUA", "KETONESU", "BILIRUBINUA", "OCCULTUA", "NITRITE", "UROBILINOGEN", "LEUKOCYTESUR", "RBCUA", "WBCUA", "BACTERIA", "SQUAMEPITHEL", "HYALINECASTS" in the last 48 hours.    Invalid input(s): "WRIGHTSUR"    Lactate (Lactic Acid) (mmol/L)   Date Value   08/04/2024 1.2   11/29/2022 2.5 (H)   11/29/2022 5.7 (HH)   03/17/2019 0.9   03/17/2019 0.7     BNP (pg/mL)   Date Value   11/14/2024 371 (H)   04/22/2024 516 (H)   04/02/2024 529 (H)   10/28/2023 226 (H)   11/29/2022 324 (H)     Sed Rate (mm/hr)   Date Value   10/04/2006 5   12/21/2005 28 (H)     D-Dimer (mg/L FEU)   Date Value   10/31/2021 0.46   09/25/2018 0.42     Ferritin   Date Value   02/16/2017 858 ng/mL (H)   11/16/2016 1,344 ng/mL (H)   06/28/2004 706.7 ng/ml (H)     No results found for: "LDH"  Troponin I (ng/mL)   Date Value   11/14/2024 0.006   11/14/2024 0.006   09/12/2024 <0.012   09/12/2024 <0.012   08/04/2024 <0.006   04/22/2024 <0.006   04/02/2024 <0.006   10/28/2023 <0.006     CPK (U/L)   Date Value   11/15/2024 124   06/21/2023 72 "   03/15/2019 189     CK (U/L)   Date Value   06/13/2024 64     No results found. However, due to the size of the patient record, not all encounters were searched. Please check Results Review for a complete set of results.  SARS-CoV2 (COVID-19) Qualitative PCR (no units)   Date Value   10/29/2023 Not Detected   11/29/2022 Not Detected     POC Rapid COVID (no units)   Date Value   10/31/2021 Negative       Microbiology labs for the last week  Microbiology Results (last 7 days)       ** No results found for the last 168 hours. **            Reviewed and noted in plan where applicable- Please see chart for full lab data.    Lines/Drains:       Peripheral IV - Single Lumen 11/14/24 1650 20 G Anterior;Right Forearm (Active)   Site Assessment Clean;Dry;Intact 11/17/24 0400   Line Securement Device Secured with sutureless device 11/14/24 1930   Extremity Assessment Distal to IV No abnormal discoloration 11/16/24 2000   Line Status Saline locked 11/17/24 0400   Dressing Status Clean;Dry;Intact 11/17/24 0400   Dressing Intervention Integrity maintained 11/17/24 0400   Dressing Change Due 11/18/24 11/16/24 2000   Site Change Due 11/18/24 11/16/24 2000   Reason Not Rotated Not due 11/16/24 2000   Number of days: 2            Peripheral IV - Single Lumen 11/14/24 1746 20 G Right Hand (Active)   Site Assessment Clean;Dry;Intact 11/17/24 0400   Line Securement Device Secured with sutureless device 11/14/24 1930   Extremity Assessment Distal to IV No abnormal discoloration 11/16/24 2000   Line Status Saline locked 11/17/24 0400   Dressing Status Clean;Dry;Intact 11/17/24 0400   Dressing Intervention Integrity maintained 11/17/24 0400   Dressing Change Due 11/18/24 11/16/24 2000   Site Change Due 11/18/24 11/16/24 2000   Reason Not Rotated Not due 11/16/24 2000   Number of days: 2       Male External Urinary Catheter 11/14/24 1800 (Active)   Collection Container Other (Comment) 11/16/24 1823   Securement Method other (see comments)  11/16/24 1823   Skin no redness;no breakdown 11/16/24 2000   Tolerance no signs/symptoms of discomfort 11/16/24 2000   Output (mL) 300 mL 11/16/24 1823   Catheter Change Date 11/16/24 11/16/24 2000   Catheter Change Time 1824 11/16/24 2000   Number of days: 2       Imaging  ECG Results              EKG 12-lead (Final result)        Collection Time Result Time QRS Duration OHS QTC Calculation    11/14/24 11:55:34 11/14/24 14:10:17 82 430                     Final result by Interface, Lab In [facility] (11/14/24 14:10:20)                   Narrative:    Test Reason :    Vent. Rate : 100 BPM     Atrial Rate : 107 BPM     P-R Int :    ms          QRS Dur :  82 ms      QT Int : 334 ms       P-R-T Axes :     -4  -6 degrees    QTcB Int : 430 ms    Baseline Artifact  Atrial fibrillation  Abnormal ECG  When compared with ECG of 14-Nov-2024 11:54,  No significant change was found  Confirmed by Quique Hernandez ([id number]) on 11/14/2024 2:10:16 PM    Referred By: AAAREFERRAL SELF           Confirmed By: Quique Hernandez                                     EKG 12-lead (Final result)        Collection Time Result Time QRS Duration OHS QTC Calculation    11/14/24 11:54:01 11/14/24 14:10:03 98 499                     Final result by Interface, Lab In [facility] (11/14/24 14:10:12)                   Narrative:    Test Reason : R07.9,    Vent. Rate : 106 BPM     Atrial Rate : 258 BPM     P-R Int :    ms          QRS Dur :  98 ms      QT Int : 376 ms       P-R-T Axes :     -4   6 degrees    QTcB Int : 499 ms    Baseline Artifact  Atrial fibrillation with rapid ventricular response  Abnormal ECG  When compared with ECG of 14-Nov-2024 11:14,  No significant change was found  Confirmed by Quique Hernandez ([id number]) on 11/14/2024 2:09:58 PM    Referred By: AAAREFERRAL SELF           Confirmed By: Quique Hernandez                                     EKG 12-lead (Final result)        Collection Time Result Time QRS Duration OHS QTC Calculation    11/14/24  11:14:35 11/14/24 14:09:47 86 474                     Final result by Interface, Lab In [facility] (11/14/24 14:09:51)                   Narrative:    Test Reason :    Vent. Rate : 102 BPM     Atrial Rate : 100 BPM     P-R Int :    ms          QRS Dur :  86 ms      QT Int : 364 ms       P-R-T Axes :      1   0 degrees    QTcB Int : 474 ms    Baseline Artifact  Atrial fibrillation with rapid ventricular response  Abnormal ECG  When compared with ECG of 14-Nov-2024 10:06,  No significant change was found  Confirmed by Quique Hernandez ([id number]) on 11/14/2024 2:09:44 PM    Referred By: KUNAL SELF           Confirmed By: Quique Hernandez                                     EKG 12-lead (Final result)        Collection Time Result Time QRS Duration OHS QTC Calculation    11/14/24 10:06:07 11/14/24 14:09:30 82 481                     Final result by Interface, Lab In [facility] (11/14/24 14:09:38)                   Narrative:    Test Reason : R06.02,    Vent. Rate : 111 BPM     Atrial Rate :    BPM     P-R Int :    ms          QRS Dur :  82 ms      QT Int : 354 ms       P-R-T Axes :      0  -3 degrees    QTcB Int : 481 ms    Atrial fibrillation with rapid ventricular response  Abnormal ECG  No previous ECGs available  Confirmed by Quique Hernandez ([id number]) on 11/14/2024 2:09:28 PM    Referred By: AAAREFERRAL SELF           Confirmed By: Quique Hernandez                                    Results for orders placed during the hospital encounter of 11/14/24    Echo    Interpretation Summary    Left Ventricle: The left ventricle is normal in size. Normal wall thickness. There is concentric remodeling. There is low normal systolic function with a visually estimated ejection fraction of 50 - 55%. There is normal diastolic function.    Right Ventricle: Normal right ventricular cavity size. Wall thickness is normal. Systolic function is normal.    Mild biatrial enlargement    Aortic Valve: The aortic valve is a trileaflet valve. There is mild  aortic valve sclerosis.    Pulmonary Artery: The estimated pulmonary artery systolic pressure is 25 mmHg.    IVC/SVC: Normal venous pressure at 3 mmHg.      US Abdomen Limited  Narrative: EXAMINATION:  US ABDOMEN LIMITED    CLINICAL HISTORY:  evaluation for cirrhosis, RUQ ultrasound;.    TECHNIQUE:  Limited ultrasound of the right upper quadrant of the abdomen including pancreas, liver, gallbladder, common bile duct was performed.    COMPARISON:  Ultrasound 10/09/2024, CT 05/02/2024    FINDINGS:  Pancreas: The visualized portions of pancreas appear normal.    Liver: Normal in size, measuring 17.4 cm. Homogeneous echotexture. No focal hepatic lesions. Hepatorenal index 1.1.    Gallbladder: Cholelithiasis.  No gallbladder wall thickening, pericholecystic fluid or sonographic Steel sign.    Biliary system: The common duct is not dilated, measuring 3 mm.  No intrahepatic ductal dilatation.    Spleen: Normal in size with a heterogeneous echotexture, measuring 10.6 x 6.1 cm.    Miscellaneous: No ascites.  Impression: Cholelithiasis without secondary signs of cholecystitis.    Electronically signed by resident: Jamel Villalpando  Date:    11/17/2024  Time:    08:04    Electronically signed by: Quique Merino Jr  Date:    11/17/2024  Time:    11:12      Labs, Imaging, EKG and Diagnostic results from 11/19/2024 were reviewed.    Medications:  Medication list was reviewed and changes noted under Assessment/Plan.  No current facility-administered medications on file prior to encounter.     Current Outpatient Medications on File Prior to Encounter   Medication Sig Dispense Refill    allopurinoL (ZYLOPRIM) 100 MG tablet TAKE 2 TABLETS(200 MG) BY MOUTH EVERY  tablet 0    cyanocobalamin (VITAMIN B-12) 1000 MCG tablet Take 1 tablet (1,000 mcg total) by mouth once daily.      diclofenac sodium (VOLTAREN) 1 % Gel Apply 2 g topically 3 (three) times daily as needed (Right knee - hand pain).      ELIQUIS 5 mg Tab TAKE 1  TABLET BY MOUTH TWICE DAILY (Patient taking differently: Take 5 mg by mouth 2 (two) times daily.) 180 tablet 3    pantoprazole (PROTONIX) 40 MG tablet Take 1 tablet (40 mg total) by mouth once daily. 90 tablet 3    rOPINIRole (REQUIP) 1 MG tablet TAKE 1 TABLET(1 MG) BY MOUTH EVERY EVENING (Patient taking differently: Take 1 tablet by mouth daily as needed (restless leg).) 90 tablet 3    rosuvastatin (CRESTOR) 20 MG tablet TAKE 1 TABLET(20 MG) BY MOUTH EVERY DAY 90 tablet 3    sertraline (ZOLOFT) 100 MG tablet Take 1 tablet (100 mg total) by mouth once daily. 90 tablet 3    [DISCONTINUED] dronedarone (MULTAQ) 400 mg Tab Take 1 tablet (400 mg total) by mouth 2 (two) times daily with meals. (Patient taking differently: Take 400 mg by mouth once daily.) 60 tablet 11    [DISCONTINUED] folic acid (FOLVITE) 1 MG tablet Take 1 tablet (1 mg total) by mouth once daily. 30 tablet 11    [DISCONTINUED] tamsulosin (FLOMAX) 0.4 mg Cap TAKE 1 CAPSULE(0.4 MG) BY MOUTH EVERY DAY (Patient taking differently: Take 0.4 mg by mouth every evening.) 90 capsule 3    [DISCONTINUED] VITAMIN B-1 100 MG tablet TAKE 1 TABLET BY MOUTH ONCE DAILY 30 tablet 2     Scheduled Medications:  Current Facility-Administered Medications   Medication Dose Route Frequency    apixaban  5 mg Oral BID    diazePAM  5 mg Oral Q12H    Followed by    [START ON 11/20/2024] diazePAM  5 mg Oral Daily    dronedarone  400 mg Oral Daily    folic acid  1 mg Oral Daily    multivitamin  1 tablet Oral Daily    mupirocin   Nasal BID    pantoprazole  40 mg Oral Daily    [START ON 11/20/2024] thiamine  100 mg Oral Daily     PRN:   Current Facility-Administered Medications:     dextrose 10%, 12.5 g, Intravenous, PRN    dextrose 10%, 25 g, Intravenous, PRN    diclofenac sodium, 2 g, Topical (Top), TID PRN    glucagon (human recombinant), 1 mg, Intramuscular, PRN    glucose, 16 g, Oral, PRN    glucose, 24 g, Oral, PRN    insulin aspart U-100, 0-5 Units, Subcutaneous, QID (AC + HS)  "PRN    lorazepam, 2 mg, Intravenous, Q1H PRN    ondansetron, 4 mg, Intravenous, Q8H PRN    sodium chloride 0.9%, 10 mL, Intravenous, PRN  Infusions:       Estimated Creatinine Clearance: 53.7 mL/min (based on SCr of 1.1 mg/dL).             Assessment/Plan:      * Alcohol withdrawal syndrome with complication     presented to ED with c/o shortness of breath and "heart racing". Has been trying to cut back on alcohol use. Now in alcohol withdrawals.      11/15: Patient required 14 mg PRN ativan since admission in addition to scheduled diazepam. Plan to increase scheduled diazepam, continue titrating PRN ativan and precedex gtt.  11/16: Precedex weaned off, required 1 dose of PRN ativan overnight.     -- Diazepam taper: 10 mg TID -> 10 mg BID -> 5 mg BID -> 5 mg daily -> stop  -- Continue PRN ativan 2 mg for CIWA > 8  -- Stop precedex gtt  --Daily thiamine, folic acid, multivitamin  --CIWA protocol q4hrs    11/18 agitated overnight - combative with nursing staff. AAO x 2  -  given ativan/haldol and wrist restraints but later was able to get out of restraints and out of the room. psychiatry follow up         Closed fracture of right ankle with routine healing  11/17   orthopedic f/u 9/17 -acute nondisplaced long spiral fracture of the lateral malleolus at the level of the syndesmosis. Nondisplaced avulsion fracture involving the tip of the medial malleolus. Ankle mortise remains congruent on weight-bearing stress x-rays. orthopedics consulted for weight bearing status     orthopedic evaluation - Recommend patient wear his tall cam boot at ALL times while weight-bearing. WBAT in boot. Utilize a walker when ambulating, recommend working with PT.       Orthostatic hypotension    11/16 SBP 70/80s on sitting up in the chair, improved to 99/55 on lying down.  IV hydration   11/17 BP improved , orthostatic hypotension +. continue gentle LR hydration     Falls frequently  11/16 daughter reports 6 falls recently.  x ray shoulder " right 8/24 -anterior inferior shoulder dislocation with impaction in Hill-Sachs deformity on the humeral head. No definite fracture.  Xray right foot 9/14 -Mildly displaced distal fibular fracture -     PT/OT eval -likely needs SNF      11/17  EP follow up to discuss frequent falls while on Eliquis.      Thrombocytopenia  The likely etiology of thrombocytopenia is alcohol abuse. monitor   Recent Labs     11/16/24  0503 11/17/24  0853 11/18/24  0413   PLT 95* 117* 121*         Heart failure with mildly reduced ejection fraction (HFmrEF)    Results for orders placed during the hospital encounter of 11/14/24  Echo  Interpretation Summary    Left Ventricle: The left ventricle is normal in size. Normal wall thickness. There is concentric remodeling. There is low normal systolic function with a visually estimated ejection fraction of 50 - 55%. There is normal diastolic function.    Right Ventricle: Normal right ventricular cavity size. Wall thickness is normal. Systolic function is normal.    Mild biatrial enlargement    Aortic Valve: The aortic valve is a trileaflet valve. There is mild aortic valve sclerosis.    Pulmonary Artery: The estimated pulmonary artery systolic pressure is 25 mmHg.    IVC/SVC: Normal venous pressure at 3 mmHg.     -- No evidence of acute exacerbation      11/17   not had any ischemic workup since his EF became mildly reduced sometime in 2021.     Primary hypertension  , controlled.  Latest blood pressure and vitals reviewed-   Temp:  [97 °F (36.1 °C)-98.3 °F (36.8 °C)]   Pulse:  [51-68]   Resp:  [14-31]   BP: ()/(41-79)   SpO2:  [98 %-100 %] .   not on medications    PRN meds if BP> 180/110 mm HG     Hypomagnesemia  replaced   Recent Labs   Lab 11/19/24  0339   MG 1.5*          Metabolic acidosis  non aniongap  . patient with bicarbonate   Recent Labs   Lab 11/17/24  0853 11/18/24  0413 11/19/24  0339   CO2 27 28 28      .started on bicarbonate drip x 5h. resolved       Type 2 diabetes  mellitus without complication, without long-term current use of insulin  Patient's FSGs are controlled on current hypoglycemics.   Last A1c reviewed-   Lab Results   Component Value Date    HGBA1C 5.3 11/15/2024    HGBA1C 5.9 (H) 02/29/2024    HGBA1C 8.0 (H) 10/29/2023     Will hold PO hypoglycemics and will start correctional scale insulin  Most recent fingerstick glucose reviewed-   Recent Labs   Lab 11/17/24  1703 11/17/24  1845 11/17/24  2128 11/18/24  0816   POCTGLUCOSE 52* 115* 154* 113*       currently on   Antihyperglycemics (From admission, onward)      Start     Stop Route Frequency Ordered    11/14/24 1554  insulin aspart U-100 pen 0-5 Units         -- SubQ Before meals & nightly PRN 11/14/24 1455             Hyponatremia    Recent Labs     11/17/24  0853 11/18/24  0413 11/19/24  0339   * 138 139     Na 122 on admission. Serum osm 264, Urine osm 409, Urine Na 30. Not fully explained by alcohol use / beer potomania, resolved now     Na improving but remains hyponatremic. Will check RUQ US to evaluate for cirrhosis given alcohol use history.     --Daily CMP  -- Hold SSRI  -- Follow-up RUQ US       Paroxysmal atrial fibrillation    Hx of atrial fibrillation   --Continue home dronedarone  -- Continue home eliquis  -- Telemetry      11/17 EP follow up to discuss frequent falls while on Eliquis.        VTE Risk Mitigation (From admission, onward)           Ordered     apixaban tablet 5 mg  2 times daily         11/14/24 1431     IP VTE HIGH RISK PATIENT  Once         11/14/24 1431     Place sequential compression device  Until discontinued         11/14/24 1431                    Discharge Planning   DALY: 11/20/2024     Code Status: Full Code   Is the patient medically ready for discharge?:     Reason for patient still in hospital (select all that apply): Treatment  Discharge Plan A: Other (alcohol rehab)   Discharge Delays: None known at this time              Mehdi Ramey MD  Department of Hospital  Medicine   [facility] - Telemetry Stepdown

## 2024-11-19 NOTE — PLAN OF CARE
Problem: Occupational Therapy  Goal: Occupational Therapy Goal  Description: Goals to be met by: 12/18/24     Patient will increase functional independence with ADLs by performing:    UE Dressing with Modified [place].  LE Dressing with Modified [place].  Grooming while standing with Modified [place].  Toileting from toilet with Modified [place] for hygiene and clothing management.   Supine to sit with Modified [place].  Step transfer with Modified [place]  Toilet transfer to toilet with Modified [place].    Outcome: Progressing     Goals remain appropriate

## 2024-11-19 NOTE — PLAN OF CARE
[facility] - Telemetry Stepdown      HOME HEALTH ORDERS  FACE TO FACE ENCOUNTER    Patient Name: Donald Warren Abadie  YOB: 1955    PCP: Bacilio Larry MD   PCP Address: [address] / [place] [address]  PCP Phone Number: [phone]  PCP Fax: [phone]    Encounter Date: 11/14/24    Admit to Home Health    Diagnoses:  Active Hospital Problems    Diagnosis  POA    *Alcohol withdrawal syndrome with complication [F10.939]  Yes    Closed fracture of right ankle with routine healing [S82.891D]  Not Applicable    Thrombocytopenia [D69.6]  Yes    Falls frequently [R29.6]  Not Applicable    Orthostatic hypotension [I95.1]  Yes    Primary hypertension [I10]  Yes    Heart failure with mildly reduced ejection fraction (HFmrEF) [I50.22]  Yes    Hypomagnesemia [E83.42]  Yes    Metabolic acidosis [E87.20]  Yes    Type 2 diabetes mellitus without complication, without long-term current use of insulin [E11.9]  Yes     Chronic    Hyponatremia [E87.1]  Yes     POA Na 133  Daily BMP      Paroxysmal atrial fibrillation [I48.0]  Yes      Resolved Hospital Problems    Diagnosis Date Resolved POA    JASS (acute kidney injury) [N17.9] 11/16/2024 Yes       Follow Up Appointments:  Future Appointments   Date Time Provider Department Center   11/21/2024  2:30 PM Mesha Callejas AU.D [facility] AUDIO [facility]   11/21/2024  3:00 PM Murali Rapp MD [facility] ENT [facility]   12/4/2024  9:00 AM Murali Uriostegui PT [facility] OP [facility] [place]   12/6/2024 11:00 AM [facility] OIC-US1 MASTER [facility] ULTR [facility]   12/6/2024 11:50 AM LAB, APPOINTMENT [facility] INTMED [facility] LAB IM [facility]   1/9/2025 10:00 AM Mode Wood MD [facility]       Allergies:  Review of patient's allergies indicates:   Allergen Reactions    No known drug allergies        Medications: Review discharge medications with patient and family and provide education.    Current Facility-Administered Medications   Medication Dose Route Frequency  Provider Last Rate Last Admin    apixaban tablet 5 mg  5 mg Oral BID Isauro Baltazar MD   5 mg at 11/19/24 2031    dextrose 10% bolus 125 mL 125 mL  12.5 g Intravenous PRN Juan Johnson MD        dextrose 10% bolus 250 mL 250 mL  25 g Intravenous PRN Juan Johnson MD        diazePAM tablet 5 mg  5 mg Oral Daily Isauro Baltazar MD        diclofenac sodium 1 % gel 2 g  2 g Topical (Top) TID PRN Mehdi Ramey MD   2 g at 11/16/24 0734    dronedarone tablet 400 mg  400 mg Oral Daily Isauro Baltazar MD   400 mg at 11/19/24 0851    folic acid tablet 1 mg  1 mg Oral Daily Mehdi Ramey MD   1 mg at 11/19/24 0845    glucagon (human recombinant) injection 1 mg  1 mg Intramuscular PRN Juan Johnson MD        glucose chewable tablet 16 g  16 g Oral PRN Juan Johnson MD   16 g at 11/17/24 1705    glucose chewable tablet 24 g  24 g Oral PRN Juan Johnson MD        insulin aspart U-100 pen 0-5 Units  0-5 Units Subcutaneous QID (AC + HS) PRN Juan Johnson MD        LORazepam injection 2 mg  2 mg Intravenous Q1H PRN Cuauhtemoc Aranda MD   2 mg at 11/17/24 2039    multivitamin tablet  1 tablet Oral Daily Isauro Baltazar MD   1 tablet at 11/19/24 0846    ondansetron injection 4 mg  4 mg Intravenous Q8H PRN Isauro Baltazar MD        pantoprazole EC tablet 40 mg  40 mg Oral Daily Isauro Baltazar MD   40 mg at 11/19/24 0846    sodium chloride 0.9% flush 10 mL  10 mL Intravenous PRN Isauro Baltazar MD        thiamine tablet 100 mg  100 mg Oral Daily Isauro Baltazar MD            Medication List        CHANGE how you take these medications      ELIQUIS 5 mg Tab  Generic drug: apixaban  TAKE 1 TABLET BY MOUTH TWICE DAILY  What changed: how much to take     rOPINIRole 1 MG tablet  Commonly known as: REQUIP  TAKE 1 TABLET(1 MG) BY MOUTH EVERY EVENING  What changed:   when to take this  reasons to take this     thiamine 100 MG tablet  Commonly known as: VITAMIN B-1  Take 1 tablet (100 mg total) by mouth once  daily.  What changed: when to take this            CONTINUE taking these medications      allopurinoL 100 MG tablet  Commonly known as: ZYLOPRIM  TAKE 2 TABLETS(200 MG) BY MOUTH EVERY DAY     cyanocobalamin 1000 MCG tablet  Commonly known as: VITAMIN B-12  Take 1 tablet (1,000 mcg total) by mouth once daily.     diclofenac sodium 1 % Gel  Commonly known as: VOLTAREN  Apply 2 g topically 3 (three) times daily as needed (Right knee - hand pain).     dronedarone 400 mg Tab  Commonly known as: MULTAQ  Take 1 tablet (400 mg total) by mouth once daily.     folic acid 1 MG tablet  Commonly known as: FOLVITE  Take 1 tablet (1 mg total) by mouth once daily.     pantoprazole 40 MG tablet  Commonly known as: PROTONIX  Take 1 tablet (40 mg total) by mouth once daily.     rosuvastatin 20 MG tablet  Commonly known as: CRESTOR  TAKE 1 TABLET(20 MG) BY MOUTH EVERY DAY     sertraline 100 MG tablet  Commonly known as: ZOLOFT  Take 1 tablet (100 mg total) by mouth once daily.            STOP taking these medications      tamsulosin 0.4 mg Cap  Commonly known as: FLOMAX                I have seen and examined this patient within the last 30 days. My clinical findings that support the need for the home health skilled services and home bound status are the following:no   Weakness/numbness causing balance and gait disturbance due to Weakness/Debility making it taxing to leave home.     Diet:   regular diet        Referrals/ Consults   Patient to wear his tall cam boot at ALL times while weight-bearing. WBAT in boot. Utilize a walker when ambulating, recommend working with PT.   --  Physical Therapy to evaluate and treat. Evaluate for home safety and equipment needs; Establish/upgrade home exercise program. Perform / instruct on therapeutic exercises, gait training, transfer training, and Range of Motion.  Occupational Therapy to evaluate and treat. Evaluate home environment for safety and equipment needs. Perform/Instruct on transfers,  ADL training, ROM, and therapeutic exercises.  Aide to provide assistance with personal care, ADLs, and vital signs.    Activities:   activity as tolerated    Nursing:   Agency to admit patient within 24 hours of hospital discharge unless specified on physician order or at patient request    SN to complete comprehensive assessment including routine vital signs. Instruct on disease process and s/s of complications to report to MD. Review/verify medication list sent home with the patient at time of discharge  and instruct patient/caregiver as needed. Frequency may be adjusted depending on start of care date.     Skilled nurse to perform up to 3 visits PRN for symptoms related to diagnosis    Notify MD if SBP > 160 or < 90; DBP > 90 or < 50; HR > 120 or < 50; Temp > 101; O2 < 88%;     Ok to schedule additional visits based on staff availability and patient request on consecutive days within the home health episode.    When multiple disciplines ordered:    Start of Care occurs on Sunday - Wednesday schedule remaining discipline evaluations as ordered on separate consecutive days following the start of care.    Thursday SOC -schedule subsequent evaluations Friday and Monday the following week.     Friday - Saturday SOC - schedule subsequent discipline evaluations on consecutive days starting Monday of the following week.    For all post-discharge communication and subsequent orders please contact patient's primary care physician. If unable to reach primary care physician or do not receive response within 30 minutes, please contact [phone] for clinical staff order clarification    Miscellaneous   Routine Skin for Bedridden Patients: Instruct patient/caregiver to apply moisture barrier cream to all skin folds and wet areas in perineal area daily and after baths and all bowel movements.    Home Health Aide:  Nursing every 48 hours, Physical Therapy every 48 hours, Occupational Therapy every 48 hours, and Home Health  Aide every 48 hours    Wound Care Orders  no    I certify that this patient is confined to his home and needs intermittent skilled nursing care, physical therapy, and occupational therapy.

## 2024-11-19 NOTE — PROGRESS NOTES
Physical Therapy Missed Treatment Note      Patient Name:  Donald Warren Abadie   MRN:  [id number]  Admitting Diagnosis:  Alcohol withdrawal syndrome with complication   Recent Surgery: * No surgery found *    Admit Date: 11/14/2024  Length of Stay: 5 days    Patient not seen today due to (P) Patient fatigue - noted that patient was seen by OT earlier and ambulated around unit with SBA and RW. Also noted to be OOBTC most of day. Donald Warren Abadie's plan of care and PT goals reviewed on this date and remain appropriate. Will follow-up for progressive mobility pending continued medical stability and patient participation.    11/19/2024

## 2024-11-19 NOTE — PLAN OF CARE
Problem: Adult Inpatient Plan of Care  Goal: Plan of Care Review  Outcome: Progressing  Goal: Patient-Specific Goal (Individualized)  Outcome: Progressing  Goal: Absence of Hospital-Acquired Illness or Injury  Outcome: Progressing  Goal: Optimal Comfort and Wellbeing  Outcome: Progressing     No acute distress noted this time. Call light within reach. Will continue to monitor.

## 2024-11-20 ENCOUNTER — TELEPHONE (OUTPATIENT)
Dept: ELECTROPHYSIOLOGY | Facility: CLINIC | Age: 69
End: 2024-11-20
Payer: MEDICARE

## 2024-11-20 VITALS
HEART RATE: 88 BPM | TEMPERATURE: 98 F | DIASTOLIC BLOOD PRESSURE: 67 MMHG | SYSTOLIC BLOOD PRESSURE: 105 MMHG | WEIGHT: 169.75 LBS | HEIGHT: 60 IN | RESPIRATION RATE: 18 BRPM | BODY MASS INDEX: 33.33 KG/M2 | OXYGEN SATURATION: 100 %

## 2024-11-20 LAB
ALBUMIN SERPL BCP-MCNC: 3.7 G/DL (ref 3.5–5.2)
ALP SERPL-CCNC: 58 U/L (ref 40–150)
ALT SERPL W/O P-5'-P-CCNC: 26 U/L (ref 10–44)
ANION GAP SERPL CALC-SCNC: 9 MMOL/L (ref 8–16)
AST SERPL-CCNC: 36 U/L (ref 10–40)
BASOPHILS # BLD AUTO: 0.05 K/UL (ref 0–0.2)
BASOPHILS NFR BLD: 0.9 % (ref 0–1.9)
BILIRUB SERPL-MCNC: 0.4 MG/DL (ref 0.1–1)
BUN SERPL-MCNC: 12 MG/DL (ref 8–23)
CALCIUM SERPL-MCNC: 9.6 MG/DL (ref 8.7–10.5)
CHLORIDE SERPL-SCNC: 102 MMOL/L (ref 95–110)
CO2 SERPL-SCNC: 24 MMOL/L (ref 23–29)
CREAT SERPL-MCNC: 1.1 MG/DL (ref 0.5–1.4)
DIFFERENTIAL METHOD BLD: ABNORMAL
EOSINOPHIL # BLD AUTO: 0.1 K/UL (ref 0–0.5)
EOSINOPHIL NFR BLD: 2.4 % (ref 0–8)
ERYTHROCYTE [DISTWIDTH] IN BLOOD BY AUTOMATED COUNT: 14.1 % (ref 11.5–14.5)
EST. GFR  (NO RACE VARIABLE): >60 ML/MIN/1.73 M^2
GLUCOSE SERPL-MCNC: 116 MG/DL (ref 70–110)
HCT VFR BLD AUTO: 37.8 % (ref 40–54)
HGB BLD-MCNC: 13.5 G/DL (ref 14–18)
IMM GRANULOCYTES # BLD AUTO: 0.03 K/UL (ref 0–0.04)
IMM GRANULOCYTES NFR BLD AUTO: 0.6 % (ref 0–0.5)
LYMPHOCYTES # BLD AUTO: 1.6 K/UL (ref 1–4.8)
LYMPHOCYTES NFR BLD: 30.5 % (ref 18–48)
MAGNESIUM SERPL-MCNC: 1.6 MG/DL (ref 1.6–2.6)
MCH RBC QN AUTO: 33.1 PG (ref 27–31)
MCHC RBC AUTO-ENTMCNC: 35.7 G/DL (ref 32–36)
MCV RBC AUTO: 93 FL (ref 82–98)
MONOCYTES # BLD AUTO: 0.8 K/UL (ref 0.3–1)
MONOCYTES NFR BLD: 15.7 % (ref 4–15)
NEUTROPHILS # BLD AUTO: 2.7 K/UL (ref 1.8–7.7)
NEUTROPHILS NFR BLD: 49.9 % (ref 38–73)
NRBC BLD-RTO: 0 /100 WBC
PHOSPHATE SERPL-MCNC: 3.9 MG/DL (ref 2.7–4.5)
PLATELET # BLD AUTO: 147 K/UL (ref 150–450)
PMV BLD AUTO: 8.8 FL (ref 9.2–12.9)
POCT GLUCOSE: 104 MG/DL (ref 70–110)
POCT GLUCOSE: 112 MG/DL (ref 70–110)
POTASSIUM SERPL-SCNC: 4 MMOL/L (ref 3.5–5.1)
PROT SERPL-MCNC: 6.6 G/DL (ref 6–8.4)
RBC # BLD AUTO: 4.08 M/UL (ref 4.6–6.2)
SODIUM SERPL-SCNC: 135 MMOL/L (ref 136–145)
WBC # BLD AUTO: 5.35 K/UL (ref 3.9–12.7)

## 2024-11-20 PROCEDURE — 25000003 PHARM REV CODE 250: Mod: HCNC

## 2024-11-20 PROCEDURE — 36415 COLL VENOUS BLD VENIPUNCTURE: CPT | Mod: HCNC

## 2024-11-20 PROCEDURE — 97116 GAIT TRAINING THERAPY: CPT | Mod: HCNC,CQ

## 2024-11-20 PROCEDURE — 80053 COMPREHEN METABOLIC PANEL: CPT | Mod: HCNC

## 2024-11-20 PROCEDURE — 85025 COMPLETE CBC W/AUTO DIFF WBC: CPT | Mod: HCNC

## 2024-11-20 PROCEDURE — 97530 THERAPEUTIC ACTIVITIES: CPT | Mod: HCNC

## 2024-11-20 PROCEDURE — 97535 SELF CARE MNGMENT TRAINING: CPT | Mod: HCNC

## 2024-11-20 PROCEDURE — 83735 ASSAY OF MAGNESIUM: CPT | Mod: HCNC

## 2024-11-20 PROCEDURE — 25000003 PHARM REV CODE 250: Mod: HCNC | Performed by: HOSPITALIST

## 2024-11-20 PROCEDURE — 84100 ASSAY OF PHOSPHORUS: CPT | Mod: HCNC

## 2024-11-20 RX ADMIN — Medication 100 MG: at 08:11

## 2024-11-20 RX ADMIN — DRONEDARONE 400 MG: 400 TABLET, FILM COATED ORAL at 08:11

## 2024-11-20 RX ADMIN — THERA TABS 1 TABLET: TAB at 08:11

## 2024-11-20 RX ADMIN — PANTOPRAZOLE SODIUM 40 MG: 40 TABLET, DELAYED RELEASE ORAL at 08:11

## 2024-11-20 RX ADMIN — FOLIC ACID 1 MG: 1 TABLET ORAL at 08:11

## 2024-11-20 RX ADMIN — DIAZEPAM 5 MG: 5 TABLET ORAL at 08:11

## 2024-11-20 RX ADMIN — APIXABAN 5 MG: 5 TABLET, FILM COATED ORAL at 08:11

## 2024-11-20 NOTE — PT/OT/SLP PROGRESS
"Physical Therapy Treatment    Patient Name:  Donald Warren Abadie   MRN:  [id number]    Recommendations:     Discharge Recommendations: Low Intensity Therapy  Discharge Equipment Recommendations: none  Barriers to discharge: None    Assessment:     Donald Warren Abadie is a 69 y.o. male admitted with a medical diagnosis of Alcohol withdrawal syndrome with complication.  He presents with the following impairments/functional limitations: weakness, impaired endurance, gait instability, impaired balance, impaired cardiopulmonary response to activity, orthopedic precautions. Pt agreeable for therapy, son present in room, pt w/increased cardiopulmonary response to exercise as pt reports not as SOB after gait as before, ambulated and returned to room to sit EOB    Rehab Prognosis: Good; patient would benefit from acute skilled PT services to address these deficits and reach maximum level of function.    Recent Surgery: * No surgery found *      Plan:     During this hospitalization, patient to be seen 4 x/week to address the identified rehab impairments via gait training, therapeutic activities, therapeutic exercises, neuromuscular re-education and progress toward the following goals:    Plan of Care Expires:  12/18/24    Subjective     Chief Complaint: diarrhea  Patient/Family Comments/goals: "I had to run to the bathroom not too long ago, I pooped in some shorts and they took them away"  Pain/Comfort:  Pain Rating 1: 0/10  Pain Rating Post-Intervention 1: 0/10      Objective:     Communicated with RN prior to session.  Patient found supine with telemetry, peripheral IV upon PT entry to room.     General Precautions: Standard, fall, hearing impaired, seizure  Orthopedic Precautions: RLE weight bearing as tolerated  Braces:  (tall CAM boot)  Respiratory Status: Room air     Functional Mobility:    Bed Mobility:   Rolling: to L side with stand by assistance  Supine > Sit: stand by assistance    Transfers:   Sit <> Stand " Transfer: contact guard assistance from EOB using rolling walker     Balance:   Sitting balance: GOOD: Maintains balance through MODERATE excursions of active trunk movement  Standing balance:   FAIR+: Takes MINIMAL challenges from all directions  FAIR: Needs CONTACT GUARD during gait                 Gait:  Distance: 400'   Assistive Device: RW  Assistance Level: stand by assistance  Gait Assessment: steady step through steps, able to converse during gait, no overt LOB, wore other shoe to even out CAM boot height, turns well, occasional cues to keep RW closer during gait             AM-PAC 6 CLICK MOBILITY  Turning over in bed (including adjusting bedclothes, sheets and blankets)?: 4  Sitting down on and standing up from a chair with arms (e.g., wheelchair, bedside commode, etc.): 3  Moving from lying on back to sitting on the side of the bed?: 4  Moving to and from a bed to a chair (including a wheelchair)?: 3  Need to walk in hospital room?: 3  Climbing 3-5 steps with a railing?: 3  Basic Mobility Total Score: 20       Treatment & Education:  Education:  PT role and PoC to increase strength and endurance    Patient left sitting edge of bed with all lines intact, call button in reach, and son present..    GOALS:   Multidisciplinary Problems       Physical Therapy Goals          Problem: Physical Therapy    Goal Priority Disciplines Outcome Interventions   Physical Therapy Goal     PT, PT/OT Progressing    Description: Goals to be met by: 24     Patient will increase functional independence with mobility by performin. Supine to sit with Modified [place]  2. Sit to supine with Modified [place]  3. Sit to stand transfer with Modified [place]  4. Bed to chair transfer with Modified [place] using Rolling Walker  5. Gait  x 750 feet with Modified [place] using Rolling Walker.   6. Ascend/Descend 4 inch curb step with Modified [place] using RW  7. Lower extremity exercise program  x7 reps per handout, with assistance as needed                         Time Tracking:     PT Received On: 11/20/24  PT Start Time: 1109     PT Stop Time: 1126  PT Total Time (min): 17 min     Billable Minutes: Gait Training 17min    Treatment Type: Treatment  PT/PTA: PTA     Number of PTA visits since last PT visit: 1 11/20/2024

## 2024-11-20 NOTE — PROGRESS NOTES
[facility] - [facility] Medicine  Progress Note    Patient Name: Donald Warren Abadie  MRN: [id number]  Patient Class: IP- Inpatient   Admission Date: 11/14/2024  Length of Stay: 6 days  Attending Physician: Mehdi Ramey MD  Primary Care Provider: Bacilio Larry MD        Subjective:     Principal Problem:Alcohol withdrawal syndrome with complication        HPI:  Donald Abadie is a 69-year-old male with a past medical history of Afib, gout, anxiety, left sided RCC s/p partial nephrectomy (2014), HTN, HLD, subdural hematoma (9/20/2024), and alcoholism who presents to the ED with concern of alcohol withdrawal. He has been trying to cut down on his drinking after a recent fall with a head injury and subdural but typically drinks about 5-6 beers daily. His daughter came to his house because he was short of breath and was complaining that his heart was racing. She is a nurse and found him in Afib with elevated rate. He fell the night prior to admission as well. She is worried about him being able to detox at home. He states that he is feeling short of breath and anxious. He had minimal confusion at the time of arrival to the ED.     In the ED, , Na 122, Cl 90, CO2 20, glucose 130, BUN 6, Creatinine 1.6, T.Bili 1.6, AST 67, Magnesium 1.3. CT head with no evidence of acute intracranial hemorrhage. At the time of MICU evaluation the patient is tachycardic, tachypneic, and diaphoretic despite 4 mg IV ativan and 5 mg PO diazepam. He exhibits hallucinations and nonsensical speech upon evaluation.         Overview/Hospital Course:    The patient was admitted to hospital medicine for further management of his acute alcohol withdrawals. He was started on scheduled and as needed benzodiazepines as well as a precedex gtt. His hyponatremia was trended and treated with IV fluids and holding his home SSRI. His withdrawal symptoms improved and precedex was weaned off. He was continued on a valium taper with  PRN ativan as needed.     No acute overnight events. Patient only required 1 dose of PRN ativan 2 mg for CIWA > 8. Precedex gtt weaning.    11/17 Transfer to [facility] medicine  [facility] of A-fib, gout, anxiety, h/o RCC s/p partial nephrectomy (2014), HTN, HLD, subdural hematoma in September 2024 presenting with acute alcohol withdrawals. Initially required a precedex gtt on top of scheduled and as needed benzos, now doing well on scheduled benzo taper with additional PRN ativan driven by CIWA. Continue diazepam taper with  PRN ativan for breakthrough CIWA scoring. RUQ US ordered. no historical cirrhosis but his mild hyponatremia is concerning for cirrhosis w/ his alcohol history. given bicarb drip for nonanion gap metabolic acidosis. Addiction psychiatry consulted. recurrent falls. PT/OT consulted. EP follow up to discuss frequent falls while on Eliquis.  not had any ischemic workup since his EF became mildly reduced sometime in 2021. recent neuropsychiatry eval - needs help with most IADLs, indicating a diagnosis of Major Neurocognitive Disorder/dementia, mild stage likely with severe alcohol use . encourage alcohol cessation. CT head 11/14 -No evidence of acute intracranial hemorrhage as above.  orthopedic f/u 9/17 -acute nondisplaced long spiral fracture of the lateral malleolus at the level of the syndesmosis. Nondisplaced avulsion fracture involving the tip of the medial malleolus. Ankle mortise remains congruent on weight-bearing stress x-rays. orthopedics consulted for weight bearing status . orthostatic hypotension +. continue gentle LR hydration .sodium improved to 135. K and Mg replaced   11/18 agitated overnight - combative with nursing staff.  given ativan/haldol and wrist restraints but later was able to get out of restraints and out of the room. psychiatry follow up  . AAOX 3 this AM  11/19 Mg replaced.   11/20 completed valium taper. Daughter plans to take patient to alcohol rehab  at [facility] in [place].   PT recs          Review of Systems:   Pain scale:   Constitutional:  fever,  chills, headache, vision loss, hearing loss, weight loss, Generalized weakness, falls, loss of smell, loss of taste, poor appetite,  sore throat  Respiratory: cough, shortness of breath.   Cardiovascular: chest pain, dizziness, palpitations, orthopnea, swelling of feet, syncope  Gastrointestinal: nausea, vomiting, abdominal pain, diarrhea, black stool,  blood in stool, change in bowel habits, constipation  Genitourinary: hematuria, dysuria, urgency, frequency  Integument/Breast: rash,  pruritis  Hematologic/Lymphatic: easy bruising, lymphadenopathy  Musculoskeletal: arthralgias , myalgias, back pain, neck pain, knee pain  Neurological: confusion -improved , seizures, tremors, slurred speech  Behavioral/Psych:  depression, anxiety, auditory or visual hallucinations     OBJECTIVE:     Physical Exam:  Body mass index is 33.15 kg/m².    Constitutional: Appears well-developed and well-nourished. obesity  Head: Normocephalic and atraumatic.   Neck: Normal range of motion. Neck supple.   Cardiovascular: Normal heart rate.  Regular heart rhythm.  Pulmonary/Chest: Effort normal.   Abdominal: No distension.  No tenderness  Musculoskeletal: Normal range of motion. No edema.   Neurological: Alert and oriented to person, place, and time. able to move bilateral upper and lower extremities without limitation   Skin: Skin is warm and dry.   Psychiatric: Normal mood and affect. Behavior is normal.                  Vital Signs  Temp: 97.7 °F (36.5 °C) (11/20/24 0735)  Pulse: 109 (11/20/24 1041)  Resp: 18 (11/20/24 0735)  BP: 106/60 (11/20/24 0735)  SpO2: 99 % (11/20/24 0735)     24 Hour VS Range    Temp:  [97.7 °F (36.5 °C)-98.4 °F (36.9 °C)]   Pulse:  []   Resp:  [16-19]   BP: ()/(58-78)   SpO2:  [95 %-99 %]     Intake/Output Summary (Last 24 hours) at 11/20/2024 1109  Last data filed at 11/20/2024 0744  Gross per 24 hour   Intake 350 ml  "  Output 2050 ml   Net -1700 ml           I/O This Shift:  I/O this shift:  In: -   Out: 700 [Urine:700]    Wt Readings from Last 3 Encounters:   11/15/24 77 kg (169 lb 12.1 oz)   11/07/24 77.5 kg (170 lb 13.7 oz)   10/03/24 78.4 kg (172 lb 13.5 oz)       I have personally reviewed the vitals and recorded Intake/Output     Laboratory/Diagnostic Data:    CBC/Anemia Labs: Coags:    Recent Labs   Lab 11/18/24  0413 11/19/24  0339 11/20/24 0427   WBC 5.07 5.89 5.35   HGB 13.0* 13.1* 13.5*   HCT 38.0* 40.5 37.8*   * 149* 147*   MCV 95 99* 93   RDW 14.0 14.3 14.1    No results for input(s): "PT", "INR", "APTT" in the last 168 hours.     Chemistries: ABG:   Recent Labs   Lab 11/18/24 0413 11/19/24 0339 11/20/24 0427    139 135*   K 3.3* 4.2 4.0    101 102   CO2 28 28 24   BUN 7* 9 12   CREATININE 1.1 1.1 1.1   CALCIUM 9.2 9.4 9.6   PROT 6.6 6.6 6.6   BILITOT 0.6 0.4 0.4   ALKPHOS 61 64 58   ALT 20 25 26   AST 33 39 36   MG 1.7 1.5* 1.6   PHOS 3.0 3.3 3.9    No results for input(s): "PH", "PCO2", "PO2", "HCO3", "POCSATURATED", "BE" in the last 168 hours.     POCT Glucose: HbA1c:    Recent Labs   Lab 11/19/24  0825 11/19/24  1235 11/19/24 1722 11/19/24 2029 11/19/24 2035 11/20/24  0825   POCTGLUCOSE 130* 106 127* 161* 150* 112*    Hemoglobin A1C   Date Value Ref Range Status   11/15/2024 5.3 4.0 - 5.6 % Final     Comment:     ADA Screening Guidelines:  5.7-6.4%  Consistent with prediabetes  >or=6.5%  Consistent with diabetes    High levels of fetal hemoglobin interfere with the HbA1C  assay. Heterozygous hemoglobin variants (HbS, HgC, etc)do  not significantly interfere with this assay.   However, presence of multiple variants may affect accuracy.     02/29/2024 5.9 (H) 4.0 - 5.6 % Final     Comment:     ADA Screening Guidelines:  5.7-6.4%  Consistent with prediabetes  >or=6.5%  Consistent with diabetes    High levels of fetal hemoglobin interfere with the HbA1C  assay. Heterozygous hemoglobin " "variants (HbS, HgC, etc)do  not significantly interfere with this assay.   However, presence of multiple variants may affect accuracy.     10/29/2023 8.0 (H) 4.0 - 5.6 % Final     Comment:     ADA Screening Guidelines:  5.7-6.4%  Consistent with prediabetes  >or=6.5%  Consistent with diabetes    High levels of fetal hemoglobin interfere with the HbA1C  assay. Heterozygous hemoglobin variants (HbS, HgC, etc)do  not significantly interfere with this assay.   However, presence of multiple variants may affect accuracy.          Cardiac Enzymes: Ejection Fractions:    No results for input(s): "CPK", "CPKMB", "MB", "TROPONINI" in the last 72 hours.   EF   Date Value Ref Range Status   11/30/2022 45 % Final   11/01/2021 45 % Final          No results for input(s): "COLORU", "APPEARANCEUA", "PHUR", "SPECGRAV", "PROTEINUA", "GLUCUA", "KETONESU", "BILIRUBINUA", "OCCULTUA", "NITRITE", "UROBILINOGEN", "LEUKOCYTESUR", "RBCUA", "WBCUA", "BACTERIA", "SQUAMEPITHEL", "HYALINECASTS" in the last 48 hours.    Invalid input(s): "WRIGHTSUR"    Lactate (Lactic Acid) (mmol/L)   Date Value   08/04/2024 1.2   11/29/2022 2.5 (H)   11/29/2022 5.7 (HH)   03/17/2019 0.9   03/17/2019 0.7     BNP (pg/mL)   Date Value   11/14/2024 371 (H)   04/22/2024 516 (H)   04/02/2024 529 (H)   10/28/2023 226 (H)   11/29/2022 324 (H)     Sed Rate (mm/hr)   Date Value   10/04/2006 5   12/21/2005 28 (H)     D-Dimer (mg/L FEU)   Date Value   10/31/2021 0.46   09/25/2018 0.42     Ferritin   Date Value   02/16/2017 858 ng/mL (H)   11/16/2016 1,344 ng/mL (H)   06/28/2004 706.7 ng/ml (H)     No results found for: "LDH"  Troponin I (ng/mL)   Date Value   11/14/2024 0.006   11/14/2024 0.006   09/12/2024 <0.012   09/12/2024 <0.012   08/04/2024 <0.006   04/22/2024 <0.006   04/02/2024 <0.006   10/28/2023 <0.006     CPK (U/L)   Date Value   11/15/2024 124   06/21/2023 72   03/15/2019 189     CK (U/L)   Date Value   06/13/2024 64     No results found. However, due to the " size of the patient record, not all encounters were searched. Please check Results Review for a complete set of results.  SARS-CoV2 (COVID-19) Qualitative PCR (no units)   Date Value   10/29/2023 Not Detected   11/29/2022 Not Detected     POC Rapid COVID (no units)   Date Value   10/31/2021 Negative       Microbiology labs for the last week  Microbiology Results (last 7 days)       ** No results found for the last 168 hours. **            Reviewed and noted in plan where applicable- Please see chart for full lab data.    Lines/Drains:       Peripheral IV - Single Lumen 11/14/24 1650 20 G Anterior;Right Forearm (Active)   Site Assessment Clean;Dry;Intact 11/17/24 0400   Line Securement Device Secured with sutureless device 11/14/24 1930   Extremity Assessment Distal to IV No abnormal discoloration 11/16/24 2000   Line Status Saline locked 11/17/24 0400   Dressing Status Clean;Dry;Intact 11/17/24 0400   Dressing Intervention Integrity maintained 11/17/24 0400   Dressing Change Due 11/18/24 11/16/24 2000   Site Change Due 11/18/24 11/16/24 2000   Reason Not Rotated Not due 11/16/24 2000   Number of days: 2            Peripheral IV - Single Lumen 11/14/24 1746 20 G Right Hand (Active)   Site Assessment Clean;Dry;Intact 11/17/24 0400   Line Securement Device Secured with sutureless device 11/14/24 1930   Extremity Assessment Distal to IV No abnormal discoloration 11/16/24 2000   Line Status Saline locked 11/17/24 0400   Dressing Status Clean;Dry;Intact 11/17/24 0400   Dressing Intervention Integrity maintained 11/17/24 0400   Dressing Change Due 11/18/24 11/16/24 2000   Site Change Due 11/18/24 11/16/24 2000   Reason Not Rotated Not due 11/16/24 2000   Number of days: 2       Male External Urinary Catheter 11/14/24 1800 (Active)   Collection Container Other (Comment) 11/16/24 1823   Securement Method other (see comments) 11/16/24 1823   Skin no redness;no breakdown 11/16/24 2000   Tolerance no signs/symptoms of  discomfort 11/16/24 2000   Output (mL) 300 mL 11/16/24 1823   Catheter Change Date 11/16/24 11/16/24 2000   Catheter Change Time 1824 11/16/24 2000   Number of days: 2       Imaging  ECG Results              EKG 12-lead (Final result)        Collection Time Result Time QRS Duration OHS QTC Calculation    11/14/24 11:55:34 11/14/24 14:10:17 82 430                     Final result by Interface, Lab In [facility] (11/14/24 14:10:20)                   Narrative:    Test Reason :    Vent. Rate : 100 BPM     Atrial Rate : 107 BPM     P-R Int :    ms          QRS Dur :  82 ms      QT Int : 334 ms       P-R-T Axes :     -4  -6 degrees    QTcB Int : 430 ms    Baseline Artifact  Atrial fibrillation  Abnormal ECG  When compared with ECG of 14-Nov-2024 11:54,  No significant change was found  Confirmed by Quique Hernandez ([id number]) on 11/14/2024 2:10:16 PM    Referred By: AAAREFERRAL SELF           Confirmed By: Quique Hernandez                                     EKG 12-lead (Final result)        Collection Time Result Time QRS Duration OHS QTC Calculation    11/14/24 11:54:01 11/14/24 14:10:03 98 499                     Final result by Interface, Lab In [facility] (11/14/24 14:10:12)                   Narrative:    Test Reason : R07.9,    Vent. Rate : 106 BPM     Atrial Rate : 258 BPM     P-R Int :    ms          QRS Dur :  98 ms      QT Int : 376 ms       P-R-T Axes :     -4   6 degrees    QTcB Int : 499 ms    Baseline Artifact  Atrial fibrillation with rapid ventricular response  Abnormal ECG  When compared with ECG of 14-Nov-2024 11:14,  No significant change was found  Confirmed by Quique Hernandez ([id number]) on 11/14/2024 2:09:58 PM    Referred By: AAAREFERRAL SELF           Confirmed By: Quique Hernanedz                                     EKG 12-lead (Final result)        Collection Time Result Time QRS Duration OHS QTC Calculation    11/14/24 11:14:35 11/14/24 14:09:47 86 474                     Final result by Interface, Lab In [facility]  (11/14/24 14:09:51)                   Narrative:    Test Reason :    Vent. Rate : 102 BPM     Atrial Rate : 100 BPM     P-R Int :    ms          QRS Dur :  86 ms      QT Int : 364 ms       P-R-T Axes :      1   0 degrees    QTcB Int : 474 ms    Baseline Artifact  Atrial fibrillation with rapid ventricular response  Abnormal ECG  When compared with ECG of 14-Nov-2024 10:06,  No significant change was found  Confirmed by Quique Hernandez ([id number]) on 11/14/2024 2:09:44 PM    Referred By: KUNAL BUTT           Confirmed By: Quique Hernandez                                     EKG 12-lead (Final result)        Collection Time Result Time QRS Duration OHS QTC Calculation    11/14/24 10:06:07 11/14/24 14:09:30 82 481                     Final result by Interface, Lab In [facility] (11/14/24 14:09:38)                   Narrative:    Test Reason : R06.02,    Vent. Rate : 111 BPM     Atrial Rate :    BPM     P-R Int :    ms          QRS Dur :  82 ms      QT Int : 354 ms       P-R-T Axes :      0  -3 degrees    QTcB Int : 481 ms    Atrial fibrillation with rapid ventricular response  Abnormal ECG  No previous ECGs available  Confirmed by Quique Hernandez ([id number]) on 11/14/2024 2:09:28 PM    Referred By: KUNAL BUTT           Confirmed By: Quique Hernandez                                    Results for orders placed during the hospital encounter of 11/14/24    Echo    Interpretation Summary    Left Ventricle: The left ventricle is normal in size. Normal wall thickness. There is concentric remodeling. There is low normal systolic function with a visually estimated ejection fraction of 50 - 55%. There is normal diastolic function.    Right Ventricle: Normal right ventricular cavity size. Wall thickness is normal. Systolic function is normal.    Mild biatrial enlargement    Aortic Valve: The aortic valve is a trileaflet valve. There is mild aortic valve sclerosis.    Pulmonary Artery: The estimated pulmonary artery systolic pressure is  25 mmHg.    IVC/SVC: Normal venous pressure at 3 mmHg.      US Abdomen Limited  Narrative: EXAMINATION:  US ABDOMEN LIMITED    CLINICAL HISTORY:  evaluation for cirrhosis, RUQ ultrasound;.    TECHNIQUE:  Limited ultrasound of the right upper quadrant of the abdomen including pancreas, liver, gallbladder, common bile duct was performed.    COMPARISON:  Ultrasound 10/09/2024, CT 05/02/2024    FINDINGS:  Pancreas: The visualized portions of pancreas appear normal.    Liver: Normal in size, measuring 17.4 cm. Homogeneous echotexture. No focal hepatic lesions. Hepatorenal index 1.1.    Gallbladder: Cholelithiasis.  No gallbladder wall thickening, pericholecystic fluid or sonographic Steel sign.    Biliary system: The common duct is not dilated, measuring 3 mm.  No intrahepatic ductal dilatation.    Spleen: Normal in size with a heterogeneous echotexture, measuring 10.6 x 6.1 cm.    Miscellaneous: No ascites.  Impression: Cholelithiasis without secondary signs of cholecystitis.    Electronically signed by resident: Jamel Villalpando  Date:    11/17/2024  Time:    08:04    Electronically signed by: Quique Merino Jr  Date:    11/17/2024  Time:    11:12      Labs, Imaging, EKG and Diagnostic results from 11/20/2024 were reviewed.    Medications:  Medication list was reviewed and changes noted under Assessment/Plan.  No current facility-administered medications on file prior to encounter.     Current Outpatient Medications on File Prior to Encounter   Medication Sig Dispense Refill    allopurinoL (ZYLOPRIM) 100 MG tablet TAKE 2 TABLETS(200 MG) BY MOUTH EVERY  tablet 0    cyanocobalamin (VITAMIN B-12) 1000 MCG tablet Take 1 tablet (1,000 mcg total) by mouth once daily.      diclofenac sodium (VOLTAREN) 1 % Gel Apply 2 g topically 3 (three) times daily as needed (Right knee - hand pain).      ELIQUIS 5 mg Tab TAKE 1 TABLET BY MOUTH TWICE DAILY (Patient taking differently: Take 5 mg by mouth 2 (two) times daily.) 180  "tablet 3    pantoprazole (PROTONIX) 40 MG tablet Take 1 tablet (40 mg total) by mouth once daily. 90 tablet 3    rOPINIRole (REQUIP) 1 MG tablet TAKE 1 TABLET(1 MG) BY MOUTH EVERY EVENING (Patient taking differently: Take 1 tablet by mouth daily as needed (restless leg).) 90 tablet 3    rosuvastatin (CRESTOR) 20 MG tablet TAKE 1 TABLET(20 MG) BY MOUTH EVERY DAY 90 tablet 3    sertraline (ZOLOFT) 100 MG tablet Take 1 tablet (100 mg total) by mouth once daily. 90 tablet 3     Scheduled Medications:  Current Facility-Administered Medications   Medication Dose Route Frequency    apixaban  5 mg Oral BID    dronedarone  400 mg Oral Daily    folic acid  1 mg Oral Daily    multivitamin  1 tablet Oral Daily    pantoprazole  40 mg Oral Daily    thiamine  100 mg Oral Daily     PRN:   Current Facility-Administered Medications:     dextrose 10%, 12.5 g, Intravenous, PRN    dextrose 10%, 25 g, Intravenous, PRN    diclofenac sodium, 2 g, Topical (Top), TID PRN    glucagon (human recombinant), 1 mg, Intramuscular, PRN    glucose, 16 g, Oral, PRN    glucose, 24 g, Oral, PRN    insulin aspart U-100, 0-5 Units, Subcutaneous, QID (AC + HS) PRN    lorazepam, 2 mg, Intravenous, Q1H PRN    ondansetron, 4 mg, Intravenous, Q8H PRN    sodium chloride 0.9%, 10 mL, Intravenous, PRN  Infusions:       Estimated Creatinine Clearance: 53.7 mL/min (based on SCr of 1.1 mg/dL).             Assessment/Plan:      * Alcohol withdrawal syndrome with complication     presented to ED with c/o shortness of breath and "heart racing". Has been trying to cut back on alcohol use. Now in alcohol withdrawals.      11/15: Patient required 14 mg PRN ativan since admission in addition to scheduled diazepam. Plan to increase scheduled diazepam, continue titrating PRN ativan and precedex gtt.  11/16: Precedex weaned off, required 1 dose of PRN ativan overnight.     -- Diazepam taper: 10 mg TID -> 10 mg BID -> 5 mg BID -> 5 mg daily -> stop  -- Continue PRN ativan 2 mg " for CIWA > 8  -- Stop precedex gtt  --Daily thiamine, folic acid, multivitamin  --CIWA protocol q4hrs    11/18 agitated overnight - combative with nursing staff. AAO x 2  -  given ativan/haldol and wrist restraints but later was able to get out of restraints and out of the room. psychiatry follow up     11/20 completed valium taper. PT recs HH     Closed fracture of right ankle with routine healing  11/17   orthopedic f/u 9/17 -acute nondisplaced long spiral fracture of the lateral malleolus at the level of the syndesmosis. Nondisplaced avulsion fracture involving the tip of the medial malleolus. Ankle mortise remains congruent on weight-bearing stress x-rays. orthopedics consulted for weight bearing status     orthopedic evaluation - Recommend patient wear his tall cam boot at ALL times while weight-bearing. WBAT in boot. Utilize a walker when ambulating, recommend working with PT.       Orthostatic hypotension    11/16 SBP 70/80s on sitting up in the chair, improved to 99/55 on lying down.  IV hydration   11/17 BP improved , orthostatic hypotension +. continue gentle LR hydration     Falls frequently  11/16 daughter reports 6 falls recently.  x ray shoulder right 8/24 -anterior inferior shoulder dislocation with impaction in Hill-Sachs deformity on the humeral head. No definite fracture.  Xray right foot 9/14 -Mildly displaced distal fibular fracture -     PT/OT eval -likely needs SNF      11/17  EP follow up to discuss frequent falls while on Eliquis.      Thrombocytopenia  The likely etiology of thrombocytopenia is alcohol abuse. monitor   Recent Labs     11/18/24  0413 11/19/24  0339 11/20/24  0427   * 149* 147*         Heart failure with mildly reduced ejection fraction (HFmrEF)    Results for orders placed during the hospital encounter of 11/14/24  Echo  Interpretation Summary    Left Ventricle: The left ventricle is normal in size. Normal wall thickness. There is concentric remodeling. There is low  normal systolic function with a visually estimated ejection fraction of 50 - 55%. There is normal diastolic function.    Right Ventricle: Normal right ventricular cavity size. Wall thickness is normal. Systolic function is normal.    Mild biatrial enlargement    Aortic Valve: The aortic valve is a trileaflet valve. There is mild aortic valve sclerosis.    Pulmonary Artery: The estimated pulmonary artery systolic pressure is 25 mmHg.    IVC/SVC: Normal venous pressure at 3 mmHg.     -- No evidence of acute exacerbation      11/17   not had any ischemic workup since his EF became mildly reduced sometime in 2021.     Primary hypertension  , controlled.  Latest blood pressure and vitals reviewed-   Temp:  [97 °F (36.1 °C)-98.3 °F (36.8 °C)]   Pulse:  [51-68]   Resp:  [14-31]   BP: ()/(41-79)   SpO2:  [98 %-100 %] .   not on medications    PRN meds if BP> 180/110 mm HG     Hypomagnesemia  replaced   Recent Labs   Lab 11/20/24  0427   MG 1.6            Metabolic acidosis  non aniongap  . patient with bicarbonate   Recent Labs   Lab 11/18/24  0413 11/19/24  0339 11/20/24  0427   CO2 28 28 24      .started on bicarbonate drip x 5h. resolved       Type 2 diabetes mellitus without complication, without long-term current use of insulin  Patient's FSGs are controlled on current hypoglycemics.   Last A1c reviewed-   Lab Results   Component Value Date    HGBA1C 5.3 11/15/2024    HGBA1C 5.9 (H) 02/29/2024    HGBA1C 8.0 (H) 10/29/2023     Will hold PO hypoglycemics and will start correctional scale insulin  Most recent fingerstick glucose reviewed-   Recent Labs   Lab 11/19/24  1235 11/19/24  1722 11/19/24 2029 11/19/24 2035   POCTGLUCOSE 106 127* 161* 150*       currently on   Antihyperglycemics (From admission, onward)      Start     Stop Route Frequency Ordered    11/14/24 1554  insulin aspart U-100 pen 0-5 Units         -- SubQ Before meals & nightly PRN 11/14/24 1455             Hyponatremia    Recent Labs      11/18/24  0413 11/19/24  0339 11/20/24  0427    139 135*       Na 122 on admission. Serum osm 264, Urine osm 409, Urine Na 30. Not fully explained by alcohol use / beer potomania, resolved now     Na improving but remains hyponatremic. Will check RUQ US to evaluate for cirrhosis given alcohol use history.     --Daily CMP  -- Hold SSRI  -- Follow-up RUQ US       Paroxysmal atrial fibrillation    Hx of atrial fibrillation   --Continue home dronedarone  -- Continue home eliquis  -- Telemetry      11/17 EP follow up to discuss frequent falls while on Eliquis.        VTE Risk Mitigation (From admission, onward)           Ordered     apixaban tablet 5 mg  2 times daily         11/14/24 1431     IP VTE HIGH RISK PATIENT  Once         11/14/24 1431     Place sequential compression device  Until discontinued         11/14/24 1431                    Discharge Planning   DALY: 11/20/2024     Code Status: Full Code   Is the patient medically ready for discharge?:     Reason for patient still in hospital (select all that apply): Treatment  Discharge Plan A: Other (alcohol rehab)   Discharge Delays: None known at this time              Mehdi Ramey MD  Department of Hospital Medicine   [facility] - Telemetry Stepdown

## 2024-11-20 NOTE — ASSESSMENT & PLAN NOTE
Patient's FSGs are controlled on current hypoglycemics.   Last A1c reviewed-   Lab Results   Component Value Date    HGBA1C 5.3 11/15/2024    HGBA1C 5.9 (H) 02/29/2024    HGBA1C 8.0 (H) 10/29/2023     Will hold PO hypoglycemics and will start correctional scale insulin  Most recent fingerstick glucose reviewed-   Recent Labs   Lab 11/19/24  1235 11/19/24  1722 11/19/24 2029 11/19/24 2035   POCTGLUCOSE 106 127* 161* 150*       currently on   Antihyperglycemics (From admission, onward)    Start     Stop Route Frequency Ordered    11/14/24 1554  insulin aspart U-100 pen 0-5 Units         -- SubQ Before meals & nightly PRN 11/14/24 0619

## 2024-11-20 NOTE — PLAN OF CARE
Patient in bed, no complaints voiced at this time, safety measures in place, call light in reach, NADN, POC ongoing    Problem: Adult Inpatient Plan of Care  Goal: Plan of Care Review  Outcome: Progressing  Goal: Patient-Specific Goal (Individualized)  Outcome: Progressing  Goal: Absence of Hospital-Acquired Illness or Injury  Outcome: Progressing  Goal: Optimal Comfort and Wellbeing  Outcome: Progressing  Goal: Readiness for Transition of Care  Outcome: Progressing     Problem: Fall Injury Risk  Goal: Absence of Fall and Fall-Related Injury  Outcome: Progressing     Problem: Restraint, Nonviolent  Goal: Absence of Harm or Injury  Outcome: Progressing     Problem: Diabetes Comorbidity  Goal: Blood Glucose Level Within Targeted Range  Outcome: Progressing     Problem: Acute Kidney Injury/Impairment  Goal: Fluid and Electrolyte Balance  Outcome: Progressing  Goal: Improved Oral Intake  Outcome: Progressing  Goal: Effective Renal Function  Outcome: Progressing     Problem: Wound  Goal: Optimal Coping  Outcome: Progressing  Goal: Optimal Functional Ability  Outcome: Progressing  Goal: Absence of Infection Signs and Symptoms  Outcome: Progressing  Goal: Improved Oral Intake  Outcome: Progressing  Goal: Optimal Pain Control and Function  Outcome: Progressing  Goal: Skin Health and Integrity  Outcome: Progressing  Goal: Optimal Wound Healing  Outcome: Progressing     Problem: Skin Injury Risk Increased  Goal: Skin Health and Integrity  Outcome: Progressing

## 2024-11-20 NOTE — CARE UPDATE
I have reviewed the chart of [facility] and collaborated with Mehdi Ramey MD in the care of the patient who is hospitalized for the following:    Active Hospital Problems    Diagnosis    *Alcohol withdrawal syndrome with complication    Closed fracture of right ankle with routine healing    Thrombocytopenia    Falls frequently    Orthostatic hypotension    Primary hypertension    Heart failure with mildly reduced ejection fraction (HFmrEF)    Hypomagnesemia    Metabolic acidosis    Type 2 diabetes mellitus without complication, without long-term current use of insulin    Hyponatremia     POA Na 133  Daily BMP      Paroxysmal atrial fibrillation          I have reviewed Donald Warren Abadie with the multidisciplinary team during discharge huddle.       Corina Caraballo PA-C  Unit Based [facility]

## 2024-11-20 NOTE — ASSESSMENT & PLAN NOTE
"   presented to ED with c/o shortness of breath and "heart racing". Has been trying to cut back on alcohol use. Now in alcohol withdrawals.      11/15: Patient required 14 mg PRN ativan since admission in addition to scheduled diazepam. Plan to increase scheduled diazepam, continue titrating PRN ativan and precedex gtt.  11/16: Precedex weaned off, required 1 dose of PRN ativan overnight.     -- Diazepam taper: 10 mg TID -> 10 mg BID -> 5 mg BID -> 5 mg daily -> stop  -- Continue PRN ativan 2 mg for CIWA > 8  -- Stop precedex gtt  --Daily thiamine, folic acid, multivitamin  --CIWA protocol q4hrs    11/18 agitated overnight - combative with nursing staff. AAO x 2  -  given ativan/haldol and wrist restraints but later was able to get out of restraints and out of the room. psychiatry follow up     11/20 completed valium taper. PT recs HH   "

## 2024-11-20 NOTE — ASSESSMENT & PLAN NOTE
The likely etiology of thrombocytopenia is alcohol abuse. monitor   Recent Labs     11/18/24  0413 11/19/24  0339 11/20/24  0427   * 149* 147*

## 2024-11-20 NOTE — PT/OT/SLP PROGRESS
"Occupational Therapy   Treatment    Name: Donald Warren Abadie  MRN: [id number]  Admitting Diagnosis:  Alcohol withdrawal syndrome with complication       Recommendations:     Discharge Recommendations: Low Intensity Therapy  Discharge Equipment Recommendations:  walker, rolling  Barriers to discharge:  None, Other (Comment) (Pt to enter inpt rehab for alcoholism prior to home.)    Assessment:     Donald Warren Abadie is a 69 y.o. male with a medical diagnosis of Alcohol withdrawal syndrome with complication.  He presents with expectation to be d/c'd today. Performance deficits affecting function are gait instability, impaired self care skills, impaired balance, decreased ROM, decreased upper extremity function, decreased lower extremity function. Recommending low intensity therapy once medically appropriate for discharge to increase maximal independence, reduce burden of care, and ensure safety. Pt stated he was entering inpt rehab for alcoholism prior to continuing OT/PT.    Rehab Prognosis:  Good; patient would benefit from acute skilled OT services to address these deficits and reach maximum level of function.       Plan:     Patient to be seen 3 x/week to address the above listed problems via self-care/home management, therapeutic activities, therapeutic exercises  Plan of Care Expires: 12/19/24  Plan of Care Reviewed with: patient    Subjective   I think I'd like to have a rolling walker"  Chief Complaint: Pain in right shoulder.  Patient/Family Comments/goals: To enter inpt rehab for alcoholism in [place].  Pain/Comfort:  Pain Rating 1: other (see comments) (not rated)  Location - Side 1: Right  Location - Orientation 1: generalized  Location 1: shoulder  Pain Addressed 1: Distraction    Objective:     Communicated with: RN prior to session.  Patient found  sitting on bench  with Other (comments) (no active lines) upon OT entry to room.    General Precautions: Standard, fall    Orthopedic Precautions:RLE " weight bearing as tolerated  Braces:  (RLE boot)  Respiratory Status: Room air     Occupational Performance:     Bed Mobility:    Pt OOB     Functional Mobility/Transfers:  Patient completed Sit <> Stand Transfer with modified independence  with  rolling walker   Patient completed Bed <> Chair Transfer using Step Transfer technique with modified independence with rolling walker  Patient completed Toilet Transfer Step Transfer technique with modified independence with  rolling walker, no grab bar needed for sit<>stand.  Functional Mobility: Pt able to ambulate community distance with RW with no LOB, breath or dizziness. Pt able to navigate with CAM boot.    Pt instructed on balance exercises challenging midline in all directions.   Pt instructed on RUE A/AAROM exercises for previous RUE injury to maintain and increase ROM.    Activities of Daily Living:  Feeding:  modified independence    Grooming: modified independence at sink with RW.  Upper Body Dressing: modified independence    Lower Body Dressing: modified independence    Toileting: modified independence for clothing and hygiene.      [facility] 6 Click ADL: 23    Treatment & Education:  Role of OT and POC  Safety  ADL retraining  Functional mobility training  Discharge planning  Importance of EOB/OOB activity  Balance and ROM instruction  Family questions answered regarding DME and continued OT.    Patient left  sitting on bench in room  with call button in reach, RN notified, and daughter present    GOALS:   Multidisciplinary Problems       Occupational Therapy Goals          Problem: Occupational Therapy    Goal Priority Disciplines Outcome Interventions   Occupational Therapy Goal     OT, PT/OT Progressing    Description: Goals to be met by: 12/18/24     Patient will increase functional independence with ADLs by performing:    UE Dressing with Modified [place].  LE Dressing with Modified [place].  Grooming while standing with Modified  [place].  Toileting from toilet with Modified [place] for hygiene and clothing management.   Supine to sit with Modified [place].  Step transfer with Modified [place]  Toilet transfer to toilet with Modified [place].                         Time Tracking:     OT Date of Treatment: 11/20/24  OT Start Time: 1330  OT Stop Time: 1429  OT Total Time (min): 59 min    Billable Minutes:Self Care/Home Management 31  Therapeutic Activity 29    OT/JUAN DIEGO: OT          11/20/2024

## 2024-11-20 NOTE — PLAN OF CARE
Chani Gleason Daughter [phone] is wanting patient to go inpt psych at [facility] in [place]. Called Yobany at [facility] [phone] and faxed [phone] clinicals for him to be accepted. Awaiting for final acceptance. Daughter will provide transport for him to [facility].      Kong Farn[place], [facility]    [facility]  [phone]

## 2024-11-20 NOTE — DISCHARGE SUMMARY
[facility] - Telemetry [facility] Medicine  Discharge Summary      Patient Name: Donald Warren Abadie  MRN: [id number]  [facility]: [id number]  Patient Class: IP- Inpatient  Admission Date: 11/14/2024  Hospital Length of Stay: 6 days  Discharge Date and Time:  11/20/2024 7:52 AM  Attending Physician: Mehdi Ramey MD   Discharging Provider: Mehdi Ramey MD  Primary Care Provider: Bacilio Larry MD  Hospital Medicine Team: [facility] HOSP MED  Mehdi Ramey MD  Primary Care Team: [facility]    HPI:   Donald Abadie is a 69-year-old male with a past medical history of Afib, gout, anxiety, left sided RCC s/p partial nephrectomy (2014), HTN, HLD, subdural hematoma (9/20/2024), and alcoholism who presents to the ED with concern of alcohol withdrawal. He has been trying to cut down on his drinking after a recent fall with a head injury and subdural but typically drinks about 5-6 beers daily. His daughter came to his house because he was short of breath and was complaining that his heart was racing. She is a nurse and found him in Afib with elevated rate. He fell the night prior to admission as well. She is worried about him being able to detox at home. He states that he is feeling short of breath and anxious. He had minimal confusion at the time of arrival to the ED.     In the ED, , Na 122, Cl 90, CO2 20, glucose 130, BUN 6, Creatinine 1.6, T.Bili 1.6, AST 67, Magnesium 1.3. CT head with no evidence of acute intracranial hemorrhage. At the time of MICU evaluation the patient is tachycardic, tachypneic, and diaphoretic despite 4 mg IV ativan and 5 mg PO diazepam. He exhibits hallucinations and nonsensical speech upon evaluation.         * No surgery found *      Hospital Course:     The patient was admitted to hospital medicine for further management of his acute alcohol withdrawals. He was started on scheduled and as needed benzodiazepines as well as a precedex gtt. His hyponatremia was trended and  treated with IV fluids and holding his home SSRI. His withdrawal symptoms improved and precedex was weaned off. He was continued on a valium taper with PRN ativan as needed.     No acute overnight events. Patient only required 1 dose of PRN ativan 2 mg for CIWA > 8. Precedex gtt weaning.    11/17 Transfer to [facility] medicine  [facility] of A-fib, gout, anxiety, h/o RCC s/p partial nephrectomy (2014), HTN, HLD, subdural hematoma in September 2024 presenting with acute alcohol withdrawals. Initially required a precedex gtt on top of scheduled and as needed benzos, now doing well on scheduled benzo taper with additional PRN ativan driven by CIWA. Continue diazepam taper with  PRN ativan for breakthrough CIWA scoring. RUQ US ordered. no historical cirrhosis but his mild hyponatremia is concerning for cirrhosis w/ his alcohol history. given bicarb drip for nonanion gap metabolic acidosis. Addiction psychiatry consulted. recurrent falls. PT/OT consulted. EP follow up to discuss frequent falls while on Eliquis.  not had any ischemic workup since his EF became mildly reduced sometime in 2021. recent neuropsychiatry eval - needs help with most IADLs, indicating a diagnosis of Major Neurocognitive Disorder/dementia, mild stage likely with severe alcohol use . encourage alcohol cessation. CT head 11/14 -No evidence of acute intracranial hemorrhage as above.  orthopedic f/u 9/17 -acute nondisplaced long spiral fracture of the lateral malleolus at the level of the syndesmosis. Nondisplaced avulsion fracture involving the tip of the medial malleolus. Ankle mortise remains congruent on weight-bearing stress x-rays. orthopedics consulted for weight bearing status . orthostatic hypotension +. continue gentle LR hydration .sodium improved to 135. K and Mg replaced   11/18 agitated overnight - combative with nursing staff.  given ativan/haldol and wrist restraints but later was able to get out of restraints and out of the room. psychiatry  "follow up  . AAOX 3 this AM  11/19 Mg replaced.   11/20 completed valium taper. PT recs HH      Goals of Care Treatment Preferences:  Code Status: Full Code         Consults:   Consults (From admission, onward)          Status Ordering Provider     Inpatient consult to Psychiatry  Once        Provider:  (Not yet assigned)    Completed KATLYN SNYDER     Inpatient consult to Critical Care Medicine  Once        Provider:  (Not yet assigned)    Completed BJORN VERMA            Psychiatric  * Alcohol withdrawal syndrome with complication     presented to ED with c/o shortness of breath and "heart racing". Has been trying to cut back on alcohol use. Now in alcohol withdrawals.      11/15: Patient required 14 mg PRN ativan since admission in addition to scheduled diazepam. Plan to increase scheduled diazepam, continue titrating PRN ativan and precedex gtt.  11/16: Precedex weaned off, required 1 dose of PRN ativan overnight.     -- Diazepam taper: 10 mg TID -> 10 mg BID -> 5 mg BID -> 5 mg daily -> stop  -- Continue PRN ativan 2 mg for CIWA > 8  -- Stop precedex gtt  --Daily thiamine, folic acid, multivitamin  --CIWA protocol q4hrs    11/18 agitated overnight - combative with nursing staff. AAO x 2  -  given ativan/haldol and wrist restraints but later was able to get out of restraints and out of the room. psychiatry follow up     11/20 completed valium taper. PT recs HH     Renal/  Hypomagnesemia  replaced   Recent Labs   Lab 11/20/24  0427   MG 1.6            Metabolic acidosis  non aniongap  . patient with bicarbonate   Recent Labs   Lab 11/18/24 0413 11/19/24 0339 11/20/24  0427   CO2 28 28 24      .started on bicarbonate drip x 5h. resolved       Hematology  Thrombocytopenia  The likely etiology of thrombocytopenia is alcohol abuse. monitor   Recent Labs     11/18/24 0413 11/19/24 0339 11/20/24  0427   * 149* 147*         Endocrine  Hyponatremia    Recent Labs     11/18/24 0413 11/19/24 0339 " 11/20/24 0427    139 135*       Na 122 on admission. Serum osm 264, Urine osm 409, Urine Na 30. Not fully explained by alcohol use / beer potomania, resolved now     Na improving but remains hyponatremic. Will check RUQ US to evaluate for cirrhosis given alcohol use history.     --Daily CMP  -- Hold SSRI  -- Follow-up RUQ US         Final Active Diagnoses:    Diagnosis Date Noted POA    PRINCIPAL PROBLEM:  Alcohol withdrawal syndrome with complication [F10.939] 05/22/2014 Yes    Closed fracture of right ankle with routine healing [S82.891D] 11/17/2024 Not Applicable    Thrombocytopenia [D69.6] 11/16/2024 Yes    Falls frequently [R29.6] 11/16/2024 Not Applicable    Orthostatic hypotension [I95.1] 11/16/2024 Yes    Primary hypertension [I10] 11/07/2024 Yes    Heart failure with mildly reduced ejection fraction (HFmrEF) [I50.22] 11/07/2024 Yes    Hypomagnesemia [E83.42] 11/29/2022 Yes    Metabolic acidosis [E87.20] 11/29/2022 Yes    Type 2 diabetes mellitus without complication, without long-term current use of insulin [E11.9] 10/07/2019 Yes     Chronic    Hyponatremia [E87.1] 03/15/2019 Yes    Paroxysmal atrial fibrillation [I48.0] 05/22/2014 Yes      Problems Resolved During this Admission:    Diagnosis Date Noted Date Resolved POA    JASS (acute kidney injury) [N17.9] 04/02/2024 11/16/2024 Yes       Discharged Condition: fair    Disposition: home health    Follow Up:    Patient Instructions:   No discharge procedures on file.    Significant Diagnostic Studies: Labs: BMP:   Recent Labs   Lab 11/19/24 0339 11/20/24 0427    116*    135*   K 4.2 4.0    102   CO2 28 24   BUN 9 12   CREATININE 1.1 1.1   CALCIUM 9.4 9.6   MG 1.5* 1.6   , CMP   Recent Labs   Lab 11/19/24 0339 11/20/24 0427    135*   K 4.2 4.0    102   CO2 28 24    116*   BUN 9 12   CREATININE 1.1 1.1   CALCIUM 9.4 9.6   PROT 6.6 6.6   ALBUMIN 3.7 3.7   BILITOT 0.4 0.4   ALKPHOS 64 58   AST 39 36   ALT 25 26    ANIONGAP 10 9   , CBC   Recent Labs   Lab 11/19/24  0339 11/20/24  0427   WBC 5.89 5.35   HGB 13.1* 13.5*   HCT 40.5 37.8*   * 147*   , INR   Lab Results   Component Value Date    INR 1.1 09/20/2024    INR 1.0 10/28/2023    INR 1.0 10/07/2019   , Lipid Panel   Lab Results   Component Value Date    CHOL 123 11/11/2022    HDL 43 11/11/2022    LDLCALC 37.4 (L) 11/11/2022    TRIG 213 (H) 11/11/2022    CHOLHDL 35.0 11/11/2022   , Troponin   Recent Labs   Lab 11/14/24  1152 11/14/24  1429   TROPONINI 0.006 0.006   , A1C:   Recent Labs   Lab 11/15/24  0352   HGBA1C 5.3   , and All labs within the past 24 hours have been reviewed  Microbiology: Blood Culture   Lab Results   Component Value Date    LABBLOO No growth after 5 days. 03/21/2019   , Sputum Culture   Lab Results   Component Value Date    GSRESP Rare WBC's 03/21/2019    GSRESP No organisms seen 03/21/2019    RESPIRATORYC No growth 03/21/2019   , and Urine Culture    Lab Results   Component Value Date    LABURIN No significant growth 04/24/2024       US Abdomen Limited  Narrative: EXAMINATION:  US ABDOMEN LIMITED    CLINICAL HISTORY:  evaluation for cirrhosis, RUQ ultrasound;.    TECHNIQUE:  Limited ultrasound of the right upper quadrant of the abdomen including pancreas, liver, gallbladder, common bile duct was performed.    COMPARISON:  Ultrasound 10/09/2024, CT 05/02/2024    FINDINGS:  Pancreas: The visualized portions of pancreas appear normal.    Liver: Normal in size, measuring 17.4 cm. Homogeneous echotexture. No focal hepatic lesions. Hepatorenal index 1.1.    Gallbladder: Cholelithiasis.  No gallbladder wall thickening, pericholecystic fluid or sonographic Steel sign.    Biliary system: The common duct is not dilated, measuring 3 mm.  No intrahepatic ductal dilatation.    Spleen: Normal in size with a heterogeneous echotexture, measuring 10.6 x 6.1 cm.    Miscellaneous: No ascites.  Impression: Cholelithiasis without secondary signs of  cholecystitis.    Electronically signed by resident: Jamel Villalpando  Date:    11/17/2024  Time:    08:04    Electronically signed by: Quique Merino Jr  Date:    11/17/2024  Time:    11:12       Pending Diagnostic Studies:       Procedure Component Value Units Date/Time    CBC auto differential [[id number]] Collected: 11/17/24 0853    Order Status: Sent Lab Status: No result     Specimen: Blood     Comprehensive metabolic panel [[id number]] Collected: 11/17/24 0853    Order Status: Sent Lab Status: No result     Specimen: Blood     EKG 12-lead [[id number]]     Order Status: Sent Lab Status: No result     Magnesium [[id number]] Collected: 11/17/24 0853    Order Status: Sent Lab Status: No result     Specimen: Blood     Phosphorus [[id number]] Collected: 11/17/24 0853    Order Status: Sent Lab Status: No result     Specimen: Blood            Medications:  Reconciled Home Medications:      Medication List        CHANGE how you take these medications      ELIQUIS 5 mg Tab  Generic drug: apixaban  TAKE 1 TABLET BY MOUTH TWICE DAILY  What changed: how much to take     rOPINIRole 1 MG tablet  Commonly known as: REQUIP  TAKE 1 TABLET(1 MG) BY MOUTH EVERY EVENING  What changed:   when to take this  reasons to take this     thiamine 100 MG tablet  Commonly known as: VITAMIN B-1  Take 1 tablet (100 mg total) by mouth once daily.  What changed: when to take this            CONTINUE taking these medications      allopurinoL 100 MG tablet  Commonly known as: ZYLOPRIM  TAKE 2 TABLETS(200 MG) BY MOUTH EVERY DAY     cyanocobalamin 1000 MCG tablet  Commonly known as: VITAMIN B-12  Take 1 tablet (1,000 mcg total) by mouth once daily.     diclofenac sodium 1 % Gel  Commonly known as: VOLTAREN  Apply 2 g topically 3 (three) times daily as needed (Right knee - hand pain).     dronedarone 400 mg Tab  Commonly known as: MULTAQ  Take 1 tablet (400 mg total) by mouth once daily.     folic acid 1 MG tablet  Commonly known as:  FOLVITE  Take 1 tablet (1 mg total) by mouth once daily.     pantoprazole 40 MG tablet  Commonly known as: PROTONIX  Take 1 tablet (40 mg total) by mouth once daily.     rosuvastatin 20 MG tablet  Commonly known as: CRESTOR  TAKE 1 TABLET(20 MG) BY MOUTH EVERY DAY     sertraline 100 MG tablet  Commonly known as: ZOLOFT  Take 1 tablet (100 mg total) by mouth once daily.            STOP taking these medications      tamsulosin 0.4 mg Cap  Commonly known as: FLOMAX              Indwelling Lines/Drains at time of discharge:   Lines/Drains/Airways       Drain  Duration             Male External Urinary Catheter 11/14/24 1800 5 days                  40  minutes of time spent on discharge, including examining the patient, providing discharge instructions, arranging   follow up and documentation        Mehdi Ramey MD  Attending Staff Physician  [facility]  pager- [phone] Ldfcam[facility]flcy - [phone]

## 2024-11-20 NOTE — ASSESSMENT & PLAN NOTE
Recent Labs     11/18/24  0413 11/19/24  0339 11/20/24  0427    139 135*       Na 122 on admission. Serum osm 264, Urine osm 409, Urine Na 30. Not fully explained by alcohol use / beer potomania, resolved now     Na improving but remains hyponatremic. Will check RUQ US to evaluate for cirrhosis given alcohol use history.     --Daily CMP  -- Hold SSRI  -- Follow-up RUQ US

## 2024-11-20 NOTE — ASSESSMENT & PLAN NOTE
non aniongap  . patient with bicarbonate   Recent Labs   Lab 11/18/24  0413 11/19/24  0339 11/20/24  0427   CO2 28 28 24      .started on bicarbonate drip x 5h. resolved

## 2024-11-21 ENCOUNTER — PATIENT OUTREACH (OUTPATIENT)
Dept: ADMINISTRATIVE | Facility: CLINIC | Age: 69
End: 2024-11-21
Payer: MEDICARE

## 2024-11-21 NOTE — PLAN OF CARE
[facility] - Telemetry Stepdown  Discharge Final Note    Primary Care Provider: Bacilio Larry MD    Expected Discharge Date: 11/20/2024    Final Discharge Note (most recent)       Final Note - 11/21/24 0832          Final Note    Assessment Type Final Discharge Note     Anticipated Discharge Disposition Home or Self Care        Post-Acute Status    Post-Acute Authorization Other     Other Status Community Services     Discharge Delays None known at this time                     Important Message from Medicare         Pt D/C home with family Family will be bringing patient to an inpt Alcohol rehab on 11/21/2024. Discharge Plan A and Plan B have been determined by review of patient's clinical status, future medical and therapeutic needs, and coverage/benefits for post-acute care in coordination with multidisciplinary team members.  Kong Farnsworth, [facility]    [facility]  [phone]

## 2024-11-25 ENCOUNTER — OUTPATIENT CASE MANAGEMENT (OUTPATIENT)
Dept: ADMINISTRATIVE | Facility: OTHER | Age: 69
End: 2024-11-25
Payer: MEDICARE

## 2024-11-25 RX ORDER — ALLOPURINOL 100 MG/1
200 TABLET ORAL DAILY
Qty: 180 TABLET | Refills: 0 | Status: SHIPPED | OUTPATIENT
Start: 2024-11-25 | End: 2024-11-26 | Stop reason: SDUPTHER

## 2024-11-25 NOTE — PROGRESS NOTES
Outpatient Care Management   - Care Plan Follow Up    Patient: Donald Warren Abadie  MRN:  [id number]  Date of Service:  11/25/2024  Completed by:  Marita Herrera LCSW  Referral Date: 08/05/2024    Reason for Visit   Patient presents with    OPCM SW Follow Up Call     11/25/2024    Goals Complete     11/25/2024       Brief Summary: This LCSW is following up on behalf of LCSW who is currently out on leave. Spoke with patient's daughter, Chani, who confirms patient is in [facility] for 30 days. Post DC assessment not completed due to patient being in rehab. Chani denies any further questions or needs at this time. SW encouraged chani to call this SW should any needs or questions arise, Chani verbalized understanding. SW will close case.    Complex Care Plan     Care plan was discussed and completed today with input from patient and/or caregiver.    Patient Instructions     No follow-ups on file.

## 2024-11-26 RX ORDER — ALLOPURINOL 100 MG/1
200 TABLET ORAL DAILY
Qty: 180 TABLET | Refills: 0 | Status: SHIPPED | OUTPATIENT
Start: 2024-11-26

## 2024-12-26 ENCOUNTER — OFFICE VISIT (OUTPATIENT)
Dept: INTERNAL MEDICINE | Facility: CLINIC | Age: 69
End: 2024-12-26
Payer: MEDICARE

## 2024-12-26 ENCOUNTER — PATIENT MESSAGE (OUTPATIENT)
Dept: INTERNAL MEDICINE | Facility: CLINIC | Age: 69
End: 2024-12-26

## 2024-12-26 VITALS
DIASTOLIC BLOOD PRESSURE: 60 MMHG | WEIGHT: 183.63 LBS | SYSTOLIC BLOOD PRESSURE: 102 MMHG | OXYGEN SATURATION: 97 % | BODY MASS INDEX: 36.05 KG/M2 | HEIGHT: 60 IN | HEART RATE: 67 BPM

## 2024-12-26 DIAGNOSIS — S49.90XD SHOULDER INJURY, UNSPECIFIED LATERALITY, SUBSEQUENT ENCOUNTER: Primary | ICD-10-CM

## 2024-12-26 DIAGNOSIS — E11.42 TYPE 2 DIABETES MELLITUS WITH DIABETIC POLYNEUROPATHY, WITHOUT LONG-TERM CURRENT USE OF INSULIN: ICD-10-CM

## 2024-12-26 DIAGNOSIS — F10.20 ALCOHOLISM: Primary | ICD-10-CM

## 2024-12-26 DIAGNOSIS — F10.20 ALCOHOLISM: ICD-10-CM

## 2024-12-26 PROCEDURE — 99999 PR PBB SHADOW E&M-EST. PATIENT-LVL IV: CPT | Mod: PBBFAC,HCNC,,

## 2024-12-26 RX ORDER — METFORMIN HYDROCHLORIDE 500 MG/1
1000 TABLET, EXTENDED RELEASE ORAL 2 TIMES DAILY WITH MEALS
Qty: 360 TABLET | Refills: 3 | Status: SHIPPED | OUTPATIENT
Start: 2024-12-26

## 2024-12-26 NOTE — PROGRESS NOTES
INTERNAL MEDICINE URGENT CARE NOTE    CHIEF COMPLAINT     Chief Complaint   Patient presents with    Numbness       HPI     Pt is accompanied by his daughter, whom he is currently residing with. He was just discharged from a 30 day rehabilitation unit for alcoholism. During that time, he did not take any of his daily medications. Since he has been home, daughter reports he has been consuming large quantities of sweets and spending a lot of time in his room.    NEUROLOGICAL:  He reports bilateral hand numbness, worse on the left side  Numbness occurs throughout the day. No alleviating or aggravating factors identified. He also reports neck pain that he believes may be contributing to the numbness. Daughter is scheduling an apt with a back/pain specialist that her family uses.    MUSCULOSKELETAL:  He experienced a shoulder dislocation several months ago that was treated and reduced in the emergency room. He was attending physical therapy but discontinued due to being away for 30 days of rehabilitation. He reports pain with hand squeezing and numbness. Pain to right knuckle, possibly due to gout affecting his knuckles.    SUBSTANCE USE:  He recently completed a 30-day alcohol rehabilitation program and has been diagnosed with alcoholic dementia.     DIABETES:  His blood sugars have been elevated with recent readings of 283 and 260 due to increased consumption of sweets and ice cream and not taking Metformin. He has not taken Metformin in months. He resumed taking Metformin today.    MEDICATIONS:  He did not take prescribed medications during rehabilitation stay. He resumed all medications today.    ROS:  General: -fever, -chills, -fatigue, -weight gain, -weight loss  Eyes: -vision changes, -redness, -discharge  ENT: -ear pain, -nasal congestion, -sore throat  Cardiovascular: -chest pain, -palpitations, -lower extremity edema  Respiratory: -cough, -shortness of breath  Gastrointestinal: -abdominal pain, -nausea,  "-vomiting, -diarrhea, -constipation, -blood in stool  Genitourinary: -dysuria, -hematuria, -frequency  Musculoskeletal: +joint pain, -muscle pain, +neck pain  Skin: -rash, -lesion  Neurological: -headache, -dizziness, +numbness, -tingling  Psychiatric: -anxiety, -depression, -sleep difficulty        Past Medical History:  Past Medical History:   Diagnosis Date    A-fib     Alcohol abuse     Anxiety     Arthritis     Chronic gout     Diabetes mellitus     DM (diabetes mellitus) 11/16/2016    "boarderline, not taking any meds currently"    Fatty liver     Hyperlipidemia     Renal cell cancer 2014    S/P TKR (total knee replacement), right 06/27/2019     Home Medications:  Prior to Admission medications    Medication Sig Start Date End Date Taking? Authorizing Provider   allopurinoL (ZYLOPRIM) 100 MG tablet Take 2 tablets (200 mg total) by mouth once daily. 11/26/24   Ayaan Reno MD   cyanocobalamin (VITAMIN B-12) 1000 MCG tablet Take 1 tablet (1,000 mcg total) by mouth once daily. 3/30/19   DEAN Vigil MD   diclofenac sodium (VOLTAREN) 1 % Gel Apply 2 g topically 3 (three) times daily as needed (Right knee - hand pain). 10/29/23   Dayna Bai MD   dronedarone (MULTAQ) 400 mg Tab Take 1 tablet (400 mg total) by mouth once daily. 11/19/24 11/19/25  Mehdi Ramey MD   ELIQUIS 5 mg Tab TAKE 1 TABLET BY MOUTH TWICE DAILY  Patient taking differently: Take 5 mg by mouth 2 (two) times daily. 1/25/23   Uriel Tolentino MD   folic acid (FOLVITE) 1 MG tablet Take 1 tablet (1 mg total) by mouth once daily. 11/19/24 11/19/25  Mehdi Ramey MD   pantoprazole (PROTONIX) 40 MG tablet Take 1 tablet (40 mg total) by mouth once daily. 1/18/24   Bacilio Larry MD   rOPINIRole (REQUIP) 1 MG tablet TAKE 1 TABLET(1 MG) BY MOUTH EVERY EVENING  Patient taking differently: Take 1 tablet by mouth daily as needed (restless leg). 1/31/24   Bacilio Larry MD   rosuvastatin (CRESTOR) 20 MG tablet TAKE 1 TABLET(20 " MG) BY MOUTH EVERY DAY 8/29/24   Uriel Tolentino MD   sertraline (ZOLOFT) 100 MG tablet Take 1 tablet (100 mg total) by mouth once daily. 5/23/24   Bacilio Reyes Jr., MD   thiamine (VITAMIN B-1) 100 MG tablet Take 1 tablet (100 mg total) by mouth once daily. 11/19/24   Mehdi Ramey MD     PHYSICAL EXAM     General: No acute distress. Well-developed. Well-nourished.  Eyes: EOMI. Sclerae anicteric.  HENT: Normocephalic. Atraumatic. Nares patent. Moist oral mucosa.  Ears: Bilateral TMs clear. Bilateral EACs clear.  Cardiovascular: Regular rate. Regular rhythm. No murmurs. No rubs. No gallops. Normal S1, S2.  Respiratory: Normal respiratory effort. Clear to auscultation bilaterally. No rales. No rhonchi. No wheezing.  Abdomen: Soft. Non-tender. Non-distended. Normoactive bowel sounds.  Musculoskeletal: No  obvious deformity.  Extremities: No lower extremity edema.  Neurological: Alert & oriented x3. No slurred speech. Normal gait.     Mental Status: He is alert and oriented to person, place, and time.      Sensory: Sensation is intact.      Motor: Motor function is intact. No weakness, tremor or atrophy to hands.     Coordination: Coordination is intact.      Gait: Gait is intact.   Psychiatric: Normal mood. Normal affect. Good insight. Good judgment.  Skin: Warm. Dry. No rash.        /60 (BP Location: Left arm, Patient Position: Sitting)   Pulse 67   Ht 5' (1.524 m)   Wt 83.3 kg (183 lb 10.3 oz)   SpO2 97%   BMI 35.87 kg/m²     ASSESSMENT/PLAN     IMPRESSION:  - Considered vitamin B deficiency as potential cause of numbness and tingling due to chronic alcohol use and recent cessation of thiamine supplementation  - Evaluated neck pain and disc narrowing as possible contributors to upper extremity symptoms  - Assessed for carpal tunnel syndrome, which was ruled out during exam  - Ordered comprehensive labs to evaluate metabolic status, vitamin levels, and overall health after period of medication  non-adherence  - Recommend gradual increase in physical activity to improve overall health and manage cravings    ALCOHOL DEPENDENCE AND COUNSELING:  - Explained potential link between chronic alcohol use, vitamin B deficiency, and neurological symptoms.    TYPE 2 DIABETES:  - Restarted Metformin 500 mg: Take 2 tablets by mouth 2 times daily with meals.  - Recommend limiting intake of sugary foods and high-carbohydrate items, especially ice cream.    SHOULDER INJURY:  - Referred to Physical therapy for shoulder injury.    PHYSICAL ACTIVITY AND REHABILITATION:  - Kleber to gradually increase daily walking, starting with short distances and working up to longer distances.    LABS:  - Ordered CMP, magnesium level, vitamin B1 (Thiamine) level, vitamin B12 level, hemoglobin A1C, and lipid panel.  - Complete ordered lab work at [facility] location on the [place].    FOLLOW-UP:  - Follow up after lab results are available to discuss findings and adjust treatment plan if necessary.        Patient education provided from [facility]. Patient was counseled on when and how to seek emergent care.   This note was generated with the assistance of ambient listening technology. Verbal consent was obtained by the patient and accompanying visitor(s) for the recording of patient appointment to facilitate this note. I attest to having reviewed and edited the generated note for accuracy, though some syntax or spelling errors may persist. Please contact the author of this note for any clarification.       Nicole QUAN, APRN, FNP-c   Department of Internal Medicine - [facility]  3:07 PM

## 2024-12-27 ENCOUNTER — PATIENT MESSAGE (OUTPATIENT)
Dept: INTERNAL MEDICINE | Facility: CLINIC | Age: 69
End: 2024-12-27
Payer: MEDICARE

## 2024-12-27 ENCOUNTER — LAB VISIT (OUTPATIENT)
Dept: LAB | Facility: HOSPITAL | Age: 69
End: 2024-12-27
Payer: MEDICARE

## 2024-12-27 DIAGNOSIS — F10.20 ALCOHOLISM: ICD-10-CM

## 2024-12-27 LAB
ALBUMIN SERPL BCP-MCNC: 4.1 G/DL (ref 3.5–5.2)
ALP SERPL-CCNC: 81 U/L (ref 40–150)
ALT SERPL W/O P-5'-P-CCNC: 15 U/L (ref 10–44)
ANION GAP SERPL CALC-SCNC: 12 MMOL/L (ref 8–16)
AST SERPL-CCNC: 23 U/L (ref 10–40)
BASOPHILS # BLD AUTO: 0.1 K/UL (ref 0–0.2)
BASOPHILS NFR BLD: 1 % (ref 0–1.9)
BILIRUB SERPL-MCNC: 0.7 MG/DL (ref 0.1–1)
BUN SERPL-MCNC: 15 MG/DL (ref 8–23)
CALCIUM SERPL-MCNC: 9.9 MG/DL (ref 8.7–10.5)
CHLORIDE SERPL-SCNC: 95 MMOL/L (ref 95–110)
CHOLEST SERPL-MCNC: 143 MG/DL (ref 120–199)
CHOLEST/HDLC SERPL: 4.5 {RATIO} (ref 2–5)
CO2 SERPL-SCNC: 24 MMOL/L (ref 23–29)
CREAT SERPL-MCNC: 1.4 MG/DL (ref 0.5–1.4)
DIFFERENTIAL METHOD BLD: ABNORMAL
EOSINOPHIL # BLD AUTO: 0.4 K/UL (ref 0–0.5)
EOSINOPHIL NFR BLD: 3.6 % (ref 0–8)
ERYTHROCYTE [DISTWIDTH] IN BLOOD BY AUTOMATED COUNT: 14 % (ref 11.5–14.5)
EST. GFR  (NO RACE VARIABLE): 54.1 ML/MIN/1.73 M^2
ESTIMATED AVG GLUCOSE: 143 MG/DL (ref 68–131)
GLUCOSE SERPL-MCNC: 189 MG/DL (ref 70–110)
HBA1C MFR BLD: 6.6 % (ref 4–5.6)
HCT VFR BLD AUTO: 43.1 % (ref 40–54)
HDLC SERPL-MCNC: 32 MG/DL (ref 40–75)
HDLC SERPL: 22.4 % (ref 20–50)
HGB BLD-MCNC: 14.4 G/DL (ref 14–18)
IMM GRANULOCYTES # BLD AUTO: 0.09 K/UL (ref 0–0.04)
IMM GRANULOCYTES NFR BLD AUTO: 0.9 % (ref 0–0.5)
LDLC SERPL CALC-MCNC: 85.4 MG/DL (ref 63–159)
LYMPHOCYTES # BLD AUTO: 2.6 K/UL (ref 1–4.8)
LYMPHOCYTES NFR BLD: 26 % (ref 18–48)
MAGNESIUM SERPL-MCNC: 1.5 MG/DL (ref 1.6–2.6)
MCH RBC QN AUTO: 30.5 PG (ref 27–31)
MCHC RBC AUTO-ENTMCNC: 33.4 G/DL (ref 32–36)
MCV RBC AUTO: 91 FL (ref 82–98)
MONOCYTES # BLD AUTO: 0.9 K/UL (ref 0.3–1)
MONOCYTES NFR BLD: 8.7 % (ref 4–15)
NEUTROPHILS # BLD AUTO: 6.1 K/UL (ref 1.8–7.7)
NEUTROPHILS NFR BLD: 59.8 % (ref 38–73)
NONHDLC SERPL-MCNC: 111 MG/DL
NRBC BLD-RTO: 0 /100 WBC
PLATELET # BLD AUTO: 208 K/UL (ref 150–450)
PMV BLD AUTO: 10.1 FL (ref 9.2–12.9)
POTASSIUM SERPL-SCNC: 5.3 MMOL/L (ref 3.5–5.1)
PROT SERPL-MCNC: 7.4 G/DL (ref 6–8.4)
RBC # BLD AUTO: 4.72 M/UL (ref 4.6–6.2)
SODIUM SERPL-SCNC: 131 MMOL/L (ref 136–145)
TRIGL SERPL-MCNC: 128 MG/DL (ref 30–150)
VIT B12 SERPL-MCNC: 596 PG/ML (ref 210–950)
WBC # BLD AUTO: 10.13 K/UL (ref 3.9–12.7)

## 2024-12-27 PROCEDURE — 83036 HEMOGLOBIN GLYCOSYLATED A1C: CPT | Mod: HCNC

## 2024-12-27 PROCEDURE — 85025 COMPLETE CBC W/AUTO DIFF WBC: CPT | Mod: HCNC

## 2024-12-27 PROCEDURE — 84425 ASSAY OF VITAMIN B-1: CPT | Mod: HCNC

## 2024-12-27 PROCEDURE — 80061 LIPID PANEL: CPT | Mod: HCNC

## 2024-12-27 PROCEDURE — 80053 COMPREHEN METABOLIC PANEL: CPT | Mod: HCNC

## 2024-12-27 PROCEDURE — 82607 VITAMIN B-12: CPT | Mod: HCNC

## 2024-12-27 PROCEDURE — 83735 ASSAY OF MAGNESIUM: CPT | Mod: HCNC

## 2024-12-31 DIAGNOSIS — R20.2 PARESTHESIA OF HAND, BILATERAL: Primary | ICD-10-CM

## 2025-01-03 LAB — VIT B1 BLD-MCNC: 135 UG/L (ref 38–122)

## 2025-01-06 ENCOUNTER — TELEPHONE (OUTPATIENT)
Dept: INTERNAL MEDICINE | Facility: CLINIC | Age: 70
End: 2025-01-06
Payer: MEDICARE

## 2025-01-06 NOTE — TELEPHONE ENCOUNTER
----- Message from Yvette sent at 1/6/2025  1:45 PM CST -----  Contact: Drew [phone]  Type:  Needs Medical Advice    Who Called: --PT Kleber--    Symptoms (please be specific): --Numbness of the hand--    How long has patient had these symptoms:--2-weeks ago--    Pharmacy name and phone #:   [id number] - [place] - [address]  AT [facility] [address]  [address] [place] [address]  Phone: [phone] Fax: [phone]      Would the patient rather a call back or a response via [facility]?--Call back--    Best Call Back Number: [phone]    Additional Information:PT would like to speak with the doctor regarding the information listed above. Please call to advise.

## 2025-01-07 ENCOUNTER — CLINICAL SUPPORT (OUTPATIENT)
Dept: REHABILITATION | Facility: HOSPITAL | Age: 70
End: 2025-01-07
Payer: MEDICARE

## 2025-01-07 DIAGNOSIS — S49.90XD SHOULDER INJURY, UNSPECIFIED LATERALITY, SUBSEQUENT ENCOUNTER: ICD-10-CM

## 2025-01-07 DIAGNOSIS — M25.611 DECREASED RIGHT SHOULDER RANGE OF MOTION: ICD-10-CM

## 2025-01-07 DIAGNOSIS — R29.3 ABNORMAL POSTURE: Primary | ICD-10-CM

## 2025-01-07 DIAGNOSIS — R29.898 UPPER EXTREMITY WEAKNESS: ICD-10-CM

## 2025-01-07 PROCEDURE — 97161 PT EVAL LOW COMPLEX 20 MIN: CPT | Mod: HCNC,PO

## 2025-01-07 PROCEDURE — 97530 THERAPEUTIC ACTIVITIES: CPT | Mod: HCNC,PO

## 2025-01-08 PROBLEM — M25.611 DECREASED RIGHT SHOULDER RANGE OF MOTION: Status: ACTIVE | Noted: 2021-06-01

## 2025-01-08 NOTE — PLAN OF CARE
[facility]   Physical Therapy Initial Evaluation      Name: Kleber Schuster [place]abarbara  Clinic Number: [id number]    Therapy Diagnosis:   Encounter Diagnoses   Name Primary?    Shoulder injury, unspecified laterality, subsequent encounter     Abnormal posture Yes    Upper extremity weakness     Decreased right shoulder range of motion         Physician: Nicole Kurtz, NP-C    Physician Orders: PT Eval and Treat   Medical Diagnosis from Referral: S49.90XD (ICD-10-CM) - Shoulder injury, unspecified laterality, subsequent encounter  Evaluation Date: 1/7/2025  Authorization Period Expiration: 12/31/25  Plan of Care Expiration: 3/31/25  Progress Note Due: 2/7/25  Date of Surgery: NA  Visit # / Visits authorized: 1/ 1   FOTO: 1/ 3      Precautions: [place]     Time In: 310 pm  Time Out: 355 pm  Total Billable Time: 45 minutes (low complexity eval)  TA 1    Subjective     Date of onset: 4 months ago dislocated R shoulder and had therapy at [facility] x 1 month but limited by insurance and inpatient    History of current condition - Kleber reports:    Pt is accompanied by his daughter, whom he is currently residing with. He was just discharged from a 30 day rehabilitation unit for alcoholism. During that time, he did not take any of his daily medications. Since he has been home, daughter reports he has been consuming large quantities of sweets and spending a lot of time in his room.     NEUROLOGICAL:  He reports bilateral hand numbness, worse on the left side  Numbness occurs throughout the day. No alleviating or aggravating factors identified. He also reports neck pain that he believes may be contributing to the numbness. Daughter is scheduling an apt with a back/pain specialist that her family uses.     MUSCULOSKELETAL:  He experienced a shoulder dislocation several months ago that was treated and reduced in the emergency room. He was attending physical therapy but discontinued due to being away for  "30 days of rehabilitation. He reports pain with hand squeezing and numbness. Pain to right knuckle, possibly due to gout affecting his knuckles.     SUBSTANCE USE:  He recently completed a 30-day alcohol rehabilitation program and has been diagnosed with alcoholic dementia.    Falls: fall and caught himself with R shoulder and arm leading to dislocation.     Imaging: : none recent related to R shoulder    Prior Therapy: yes  Social History: none lives alone  Occupation: retired  Prior Level of Function: community ambulation with exercise few days a week  Current Level of Function: limited by pain and range    Pain:  Current 4/10, worst 7/10, best 2/10   Location: right shoulder    Description: Tight, Numb, and Sharp  Aggravating Factors: Lifting  Easing Factors: ice and heating pad    Patients goals: to improve motion and pain.     Medical History:   Past Medical History:   Diagnosis Date    A-fib     Alcohol abuse     Anxiety     Arthritis     Chronic gout     Diabetes mellitus     DM (diabetes mellitus) 11/16/2016    "boarderline, not taking any meds currently"    Fatty liver     Hyperlipidemia     Renal cell cancer 2014    S/P TKR (total knee replacement), right 06/27/2019       Surgical History:   Donald Warren Abadie  has a past surgical history that includes Abscess drainage; Hand surgery (Left); Partial nephrectomy (Left, August 2014); Colonoscopy; Kidney surgery; Percutaneous cryotherapy of peripheral nerve using liquid nitrous oxide in closed needle device (Right, 6/17/2019); Total knee arthroplasty (Right, 6/27/2019); Joint replacement; and Endoscopic ultrasound of upper gastrointestinal tract (N/A, 6/11/2024).    Medications:   Kleber has a current medication list which includes the following prescription(s): allopurinol, cyanocobalamin, diclofenac sodium, dronedarone, eliquis, folic acid, metformin, pantoprazole, ropinirole, rosuvastatin, sertraline, and thiamine.    Allergies:   Review of patient's " allergies indicates:   Allergen Reactions    No known drug allergies         Objective          Observation:/Posture Rounded shoulder, Head forward, Affected scapula depressed, and Affected scapula downwardly rotated      Cervical ROM: (measured in degrees)    Degrees Quality   Flexion wnl    Right SB 33    Left SB 28    Right rotation 55    Left rotation 65      Shoulder Active/ Passive ROM: (measured in degrees)   Shoulder Right UE Left UE   Flexion  limited as follows: 75/ 155 WNLs   Abduction limited as follows: 55/155 WNLs   ER limited as follows: 40/50 WNLs   IR WNLs WNLs     Sensation: Dermatomes: Intact to light touch grossly    Reflexes: NT    Strength: (measured in neutral spine, gradin-5 out of 5)   Cervical MMT   Flexion 5/5   Extension 4+/5   Right Side Bend 4+/5   Left Side Bend 4+/5   Upper trap. 4+/5     Upper Extremity Strength: (grading 1-5 out of 5)      Right UE Left UE   Shoulder Flexion: 3-/5 5/5   Shoulder Abduction: 3-/5 5/5   Shoulder ER: 3-/5 4+/5   Shoulder IR: 4-/5 5/5        Elbow Flexion: 4+/5 5/5   Elbow Extension: 4+/5 5/5          Cervical Spine Special Tests: ((+): positive; (-): negative)   Compression neg   Distraction pos   Tia test/TOS NT   Lateral Flexion Alar Ligament intact   First Rib  Speeds test  Empty can R pos  NT  NT          Intake Outcome Measure for FOTO shoulder Survey    Therapist reviewed FOTO scores for Donald Warren Abadie on 2025.   FOTO report - see Media section or FOTO account episode details.    Intake Score: 20%         Treatment     Total Treatment time (time-based codes) separate from Evaluation: 20 minutes     Kleber received the treatments listed below:      therapeutic exercises to develop strength, endurance, and ROM for 0 minutes including:      manual therapy techniques: Joint mobilizations, Manual traction, and Myofacial release were applied to the: levator, teres minor, pectoralis minor and infraspinatus for 3 minutes,  including:  Cervical traction    neuromuscular re-education activities to improve: Balance, Coordination, Kinesthetic, Sense, Proprioception, and Posture for 5 minutes. The following activities were included:      Scapular retraction 2 x 10 hold 3 secs  Cervical retraction 2 x 10 hold 3 secs  Supine stretch towel roll x 3 min vertical  Supine stretch towel roll horizontal T2-T4 level x 3 min       shld extension red TB 3 x 10   shld row  green TB 3 x 10  Supine horizontal abduction 2 x 15 red T band vs AROM small amplitude  Supine diagonals with red Tband 2 x 10 vs AROM small amplitude  Supine IR/ ER small amplitude       Upper trap stretch 3 x 30 secs    Levator stretch 3 x 30 secs     therapeutic activities to improve functional performance for 10  minutes, including:  Pt education with findings, plan of care and progression with exercise as tolerated to avoid compensatory mechanics which is a limiting factor.  [place] 2 x 15  [place] x 2 min flexion with eccentric lowering  Table slides in various position with incline as tolerated        hot pack for 0 minutes to R shoulder neck.      Patient Education and Home Exercises     Education provided:   - pain management and HEP    Written Home Exercises Provided: Pt instructed to continue prior HEP. Exercises were reviewed and Kleber was able to demonstrate them prior to the end of the session.  Kleber demonstrated good  understanding of the education provided. See EMR under Patient Instructions for exercises provided during therapy sessions.    Assessment     Kleber is a 69 y.o. male referred to outpatient Physical Therapy with a medical diagnosis of S49.90XD (ICD-10-CM) - Shoulder injury, unspecified laterality, subsequent encounter. Patient presents with R shoulder weakness, Loss of AROM and pain due to prior severe dislocation and reduction and complicated by break in care from initial rehab due to insurance and inpatient stay. Pt with good insight with HEP but  poor mechanics with excessive hiking during exercise and session on evaluation. Pt tolerated HEP reproduction with good insight as he had prior PT services with similar exercises.     Patient prognosis is Good.   Patient will benefit from skilled outpatient Physical Therapy to address the deficits stated above and in the chart below, provide patient /family education, and to maximize patientt's level of independence.     Plan of care discussed with patient: Yes  Patient's spiritual, cultural and educational needs considered and patient is agreeable to the plan of care and goals as stated below:     Anticipated Barriers for therapy: prolonged break in care    Medical Necessity is demonstrated by the following  History  Co-morbidities and personal factors that may impact the plan of care [x] LOW: no personal factors / co-morbidities  [] MODERATE: 1-2 personal factors / co-morbidities  [] HIGH: 3+ personal factors / co-morbidities    Moderate / High Support Documentation:   Co-morbidities affecting plan of care: NA    Personal Factors:   no deficits     Examination  Body Structures and Functions, activity limitations and participation restrictions that may impact the plan of care [x] LOW: addressing 1-2 elements  [] MODERATE: 3+ elements  [] HIGH: 4+ elements (please support below)    Moderate / High Support Documentation: NA     Clinical Presentation [x] LOW: stable  [] MODERATE: Evolving  [] HIGH: Unstable     Decision Making/ Complexity Score: low         Goals:  Short Term Goals: 3 weeks   Pt to be Independent with HEP for increased strength, endurance and functional mobility.  Pt to have decreased pain 2/10 for increased functional mobility and tolerance.  Pt to increase AROM to 120 degrees R shoulder flexion and abduction for increased functional mobility.      Long Term Goals: 10 weeks   Pt to be Independent with pain management strategies and verbalize 2 for increased strength, endurance and functional  mobility.  Pt to have decreased pain 0/10 for increased functional mobility and tolerance.  Pt to increase AROM to 155 degrees R shoulder flexion and abduction for increased functional mobility.  Pt to increase activity tolerance to 1 hrs for without increased pain for increased overall functional activity.  Pt to increase R shoulder flexion, abduction and ER to 4/5 for increased functional strength and activity.    Plan     Plan of care Certification: 1/7/2025 to 3/31/25.    Outpatient Physical Therapy 2 times weekly for 10 weeks to include the following interventions: Cervical/Lumbar Traction, Electrical Stimulation DN, Manual Therapy, Moist Heat/ Ice, Neuromuscular Re-ed, Patient Education, Therapeutic Activities, and Therapeutic Exercise.     Roxy Cortez, PT        Physician's Signature: _________________________________________ Date: ________________

## 2025-01-09 ENCOUNTER — HOSPITAL ENCOUNTER (OUTPATIENT)
Dept: RADIOLOGY | Facility: HOSPITAL | Age: 70
Discharge: HOME | End: 2025-01-09
Attending: INTERNAL MEDICINE
Payer: MEDICARE

## 2025-01-09 ENCOUNTER — OFFICE VISIT (OUTPATIENT)
Dept: RHEUMATOLOGY | Facility: CLINIC | Age: 70
End: 2025-01-09
Payer: MEDICARE

## 2025-01-09 ENCOUNTER — OFFICE VISIT (OUTPATIENT)
Dept: GASTROENTEROLOGY | Facility: CLINIC | Age: 70
End: 2025-01-09
Payer: MEDICARE

## 2025-01-09 VITALS — SYSTOLIC BLOOD PRESSURE: 94 MMHG | HEART RATE: 131 BPM | DIASTOLIC BLOOD PRESSURE: 61 MMHG

## 2025-01-09 VITALS — WEIGHT: 185.88 LBS | HEIGHT: 66 IN | BODY MASS INDEX: 29.87 KG/M2

## 2025-01-09 DIAGNOSIS — M15.0 PRIMARY OSTEOARTHRITIS INVOLVING MULTIPLE JOINTS: ICD-10-CM

## 2025-01-09 DIAGNOSIS — F10.20 ALCOHOLISM: ICD-10-CM

## 2025-01-09 DIAGNOSIS — K86.2 PANCREAS CYST: Primary | ICD-10-CM

## 2025-01-09 DIAGNOSIS — F10.20 ALCOHOL USE DISORDER, SEVERE, DEPENDENCE: ICD-10-CM

## 2025-01-09 DIAGNOSIS — E66.01 SEVERE OBESITY (BMI 35.0-39.9) WITH COMORBIDITY: ICD-10-CM

## 2025-01-09 DIAGNOSIS — M79.642 LEFT HAND PAIN: ICD-10-CM

## 2025-01-09 DIAGNOSIS — E11.9 TYPE 2 DIABETES MELLITUS WITHOUT COMPLICATION, WITHOUT LONG-TERM CURRENT USE OF INSULIN: Chronic | ICD-10-CM

## 2025-01-09 DIAGNOSIS — M1A.09X0 IDIOPATHIC CHRONIC GOUT OF MULTIPLE SITES WITHOUT TOPHUS: Primary | ICD-10-CM

## 2025-01-09 DIAGNOSIS — M1A.09X0 IDIOPATHIC CHRONIC GOUT OF MULTIPLE SITES WITHOUT TOPHUS: ICD-10-CM

## 2025-01-09 PROCEDURE — 99214 OFFICE O/P EST MOD 30 MIN: CPT | Mod: HCNC,S$GLB,, | Performed by: INTERNAL MEDICINE

## 2025-01-09 PROCEDURE — 3078F DIAST BP <80 MM HG: CPT | Mod: HCNC,CPTII,S$GLB, | Performed by: INTERNAL MEDICINE

## 2025-01-09 PROCEDURE — 1160F RVW MEDS BY RX/DR IN RCRD: CPT | Mod: HCNC,CPTII,S$GLB, | Performed by: INTERNAL MEDICINE

## 2025-01-09 PROCEDURE — 3288F FALL RISK ASSESSMENT DOCD: CPT | Mod: HCNC,CPTII,S$GLB, | Performed by: INTERNAL MEDICINE

## 2025-01-09 PROCEDURE — 1159F MED LIST DOCD IN RCRD: CPT | Mod: HCNC,CPTII,S$GLB, | Performed by: INTERNAL MEDICINE

## 2025-01-09 PROCEDURE — 73130 X-RAY EXAM OF HAND: CPT | Mod: 26,HCNC,LT, | Performed by: RADIOLOGY

## 2025-01-09 PROCEDURE — 99999 PR PBB SHADOW E&M-EST. PATIENT-LVL III: CPT | Mod: PBBFAC,HCNC,, | Performed by: INTERNAL MEDICINE

## 2025-01-09 PROCEDURE — 1125F AMNT PAIN NOTED PAIN PRSNT: CPT | Mod: HCNC,CPTII,S$GLB, | Performed by: INTERNAL MEDICINE

## 2025-01-09 PROCEDURE — 1101F PT FALLS ASSESS-DOCD LE1/YR: CPT | Mod: HCNC,CPTII,S$GLB, | Performed by: INTERNAL MEDICINE

## 2025-01-09 PROCEDURE — 73130 X-RAY EXAM OF HAND: CPT | Mod: TC,HCNC,LT

## 2025-01-09 PROCEDURE — 3074F SYST BP LT 130 MM HG: CPT | Mod: HCNC,CPTII,S$GLB, | Performed by: INTERNAL MEDICINE

## 2025-01-09 PROCEDURE — 3008F BODY MASS INDEX DOCD: CPT | Mod: HCNC,CPTII,S$GLB, | Performed by: INTERNAL MEDICINE

## 2025-01-09 PROCEDURE — 1126F AMNT PAIN NOTED NONE PRSNT: CPT | Mod: HCNC,CPTII,S$GLB, | Performed by: INTERNAL MEDICINE

## 2025-01-09 RX ORDER — METHYLPREDNISOLONE 4 MG/1
TABLET ORAL
Qty: 21 TABLET | Refills: 0 | Status: SHIPPED | OUTPATIENT
Start: 2025-01-09

## 2025-01-09 NOTE — PROGRESS NOTES
SUBJECTIVE:         Chief Complaint:   Chief Complaint   Patient presents with    Follow-up    Gastroesophageal Reflux    Abdominal Pain       History of Present Illness:  Patient is a 69 y.o. 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  He is due for screening of this cyst in another few months.  However he has reporting worsening abdominal discomfort and reflux.  He had been in alcohol rehab up until last month and after having gotten out of the have where he had not been given his medication he is noticing increased dyspepsia and heartburn occurring daily throughout the day.  He is on Protonix once a day in the morning does not take the occasional Pepcid and Mylanta.  He carries a little bit extra weight but has not lost weight has no dysphagia symptoms.      OBJECTIVE:     Vital Signs (Most Recent)       General: well developed, well nourished    Diagnostic Results:  MRI: Reviewed      ASSESSMENT/PLAN:     We will plan for an EGD to look for esophagitis or peptic ulcer disease as his next endoscopic ultrasound.

## 2025-01-10 NOTE — PROGRESS NOTES
History of present illness:  69-year-old gentleman has a history of crystal proven gout which dates back to 1998.  I have been following him since 2006.  He remains on allopurinol 200 mg daily, although sometimes he will just take 100 mg.  He has had no recent gout attacks.  He has no history of tophi.  he is had no recent gout attacks.  He was last seen over 2 years ago.    He comes in today with his daughter, with whom he is living.  He had been hospitalized for alcohol withdrawal.  He has not drunk recently.  He continued on allopurinol except when he was an inpatient.  He has now been back on the medication for a month.      Six weeks ago he developed pain in the left hand.  It was of sudden onset.  Had swelling with erythema and increased warmth in the entire hand.  It was dissimilar from his previous gout attack.  He took Tylenol.  He has been using Voltaren gel with some relief.  He tried colchicine but it was after the attacks started in it did not help.  His hand is actually improving.    He still has problems with his shoulders.  Had an injury.  He is going to therapy.  He has had no other recent medical problems.      Physical examination:   Musculoskeletal: He has decreased range of motion of the right shoulder.  Left shoulder is unremarkable.    Elbows and wrists are unremarkable.    He has soft tissue swelling of the left 2nd MCP and PIP.  He has tenderness of other PIP knees but no swelling.  He has good  strength in his able to make a fist.  Lower extremity is unremarkable.      Assessment:  He has a history of chronic gouty arthritis.  He was off allopurinol for a period of time.  He has acute pain in the left hand.  He still has some residual swelling in the left 2nd MCP and PIP.  Although this may be related to a gout attack, I do wish to rule out other forms of arthritis.      Plans:  1.  Laboratory studies obtained   2. I have ordered an x-ray of the hands  3.  Continue the use of Voltaren  gel   4.  I will adjust the allopurinol dosage if necessary   5.  Return to see me in 6 months.

## 2025-01-11 ENCOUNTER — TELEPHONE (OUTPATIENT)
Dept: ENDOSCOPY | Facility: HOSPITAL | Age: 70
End: 2025-01-11
Payer: MEDICARE

## 2025-01-11 DIAGNOSIS — K86.2 PANCREAS CYST: Primary | ICD-10-CM

## 2025-01-11 NOTE — TELEPHONE ENCOUNTER
Dear Provider,    Patient has a scheduled procedure Upper Endoscopy (EGD) on 2/7/2025 and is currently taking a blood thinner. In order to ensure patient safety, we would like to confirm that the patient can place their blood thinner medication on hold for the procedure. Can he/she discontinue Eliquis (apixaban) for a minimum of 2 days prior to the procedure?     Thank you for your prompt reply.    [facility] Endoscopy Scheduling

## 2025-01-11 NOTE — TELEPHONE ENCOUNTER
Spoke to patient daughter Chani to schedule procedure(s) Upper Endoscopy (EGD)/Eus       Physician to perform procedure(s) Dr. SHELLY Wood  Date of Procedure (s) 2/7/2025  Arrival Time 2:00 PM  Time of Procedure(s) 3:00 PM   Location of Procedure(s) [address]  Type of Rx Prep sent to patient: Other  Instructions provided to patient via [facility]    Patient was informed on the following information and verbalized understanding. Screening questionnaire reviewed with patient and complete. If procedure requires anesthesia, a responsible adult needs to be present to accompany the patient home, patient cannot drive after receiving anesthesia. Appointment details are tentative, especially check-in time. Patient will receive a prep-op call 7 days prior to confirm check-in time for procedure. If applicable the patient should contact their pharmacy to verify Rx for procedure prep is ready for pick-up. Patient was advised to call the scheduling department at [phone] if pharmacy states no Rx is available. Patient was advised to call the endoscopy scheduling department if any questions or concerns arise.      [facility] Scheduling Department

## 2025-01-13 DIAGNOSIS — Z00.00 ENCOUNTER FOR MEDICARE ANNUAL WELLNESS EXAM: ICD-10-CM

## 2025-01-13 DIAGNOSIS — I48.0 PAROXYSMAL ATRIAL FIBRILLATION: ICD-10-CM

## 2025-01-14 ENCOUNTER — CLINICAL SUPPORT (OUTPATIENT)
Dept: REHABILITATION | Facility: HOSPITAL | Age: 70
End: 2025-01-14
Payer: MEDICARE

## 2025-01-14 DIAGNOSIS — R29.3 ABNORMAL POSTURE: ICD-10-CM

## 2025-01-14 DIAGNOSIS — R29.898 UPPER EXTREMITY WEAKNESS: ICD-10-CM

## 2025-01-14 DIAGNOSIS — M25.611 DECREASED RIGHT SHOULDER RANGE OF MOTION: Primary | ICD-10-CM

## 2025-01-14 PROCEDURE — 97530 THERAPEUTIC ACTIVITIES: CPT | Mod: HCNC,PO,CQ

## 2025-01-14 PROCEDURE — 97110 THERAPEUTIC EXERCISES: CPT | Mod: HCNC,PO,CQ

## 2025-01-14 PROCEDURE — 97112 NEUROMUSCULAR REEDUCATION: CPT | Mod: HCNC,PO,CQ

## 2025-01-14 NOTE — PROGRESS NOTES
Physical Therapy Daily Treatment Note     [facility]  Cl[facility] Number: [id number]    Therapy Diagnosis:   Encounter Diagnoses   Name Primary?    Decreased right shoulder range of motion Yes    Abnormal posture     Upper extremity weakness      Physician: Nicole Kurtz, NP-C    Visit Date: 1/14/2025    Physician Orders: PT Eval and Treat   Medical Diagnosis from Referral: S49.90XD (ICD-10-CM) - Shoulder injury, unspecified laterality, subsequent encounter  Evaluation Date: 1/7/2025  Authorization Period Expiration: 12/31/25  Plan of Care Expiration: 3/31/25  Progress Note Due: 2/7/25  Date of Surgery: NA  Visit # / Visits authorized: 1/ 20  FOTO: 1/ 3       Time In: 3:00 pm  Time Out: 3:53 pm  Total Billable Time: 53 minutes    Precautions: [place]    Subjective     Pt reports: continued R shoulder pain levels since initial evaluation. He has been able to perform home exercises with no issues.    He was compliant with home exercise program.  Response to previous treatment: First follow up  Functional change: TBD    Pain: 4/10  Location: right shoulder      Objective     Objective measures displayed on progress notes unless otherwise noted       Treatment      Kleber received therapeutic exercises to develop strength, endurance, ROM, flexibility, posture and core stabilization for 10 minutes including:    Upper trap stretch 3 x 30 secs  Levator stretch 3 x 30 secs   Supine stretch towel roll x 3 min vertical  Supine stretch towel roll horizontal T2-T4 level x 3 min     Kleber received the following manual therapy techniques: Joint mobilizations, Manual traction, and Soft tissue Mobilization were applied to the:  for 00 minutes, including:      Kleber participated in neuromuscular re-education activities to improve: Balance, Coordination, Proprioception and Posture for 33 minutes. The following activities were included:    Scapular retraction 2 x 10 hold 3 secs  Cervical retraction 2 x 10 hold 3 secs  Supine  horizontal abduction 2 x 15 red T band   Supine diagonals with red Tband 2 x 10   Supine IR/ ER x 30    Kleber participated in dynamic functional therapeutic activities to improve functional performance for 10  minutes, including:    Pulleys flex/abd x 2 minutes ea  Seated table slides at incline flex/scap x 30 ea  [place] 1# dowel 2 x 15        Kleber received cold pack for 00 minutes to R shoulder.      Home Exercises Provided and Patient Education Provided     Education provided:   - HEP review    Written Home Exercises Provided: Patient instructed to cont prior HEP.  Exercises were reviewed and Kleber was able to demonstrate them prior to the end of the session.  Kleber demonstrated good  understanding of the education provided.     See EMR under Patient Instructions for exercises provided prior visit.    Assessment     Kleber returned for his first follow up visit reporting mild R shoulder pain levels. Treatment began with HEP review with additional R shoulder mobility/strengthening and postural strengthening. Min verbal cueing required to avoid UT compensatory movements with over head motions. Pt demonstrated most difficulty with supine diagonals. Overall good tolerance with no adverse effects to today's treatment. Will continue to progress per pt's tolerance.     Kleber Is progressing well towards his goals.   Pt prognosis is Good.     Pt will continue to benefit from skilled outpatient physical therapy to address the deficits listed in the problem list box on initial evaluation, provide pt/family education and to maximize pt's level of independence in the home and community environment.     Pt's spiritual, cultural and educational needs considered and pt agreeable to plan of care and goals.     Anticipated barriers to physical therapy: prolonged break in care     Goals: Short Term Goals: 3 weeks   Pt to be Independent with HEP for increased strength, endurance and functional mobility.  Pt to have decreased  pain 2/10 for increased functional mobility and tolerance.  Pt to increase AROM to 120 degrees R shoulder flexion and abduction for increased functional mobility.        Long Term Goals: 10 weeks   Pt to be Independent with pain management strategies and verbalize 2 for increased strength, endurance and functional mobility.  Pt to have decreased pain 0/10 for increased functional mobility and tolerance.  Pt to increase AROM to 155 degrees R shoulder flexion and abduction for increased functional mobility.  Pt to increase activity tolerance to 1 hrs for without increased pain for increased overall functional activity.  Pt to increase R shoulder flexion, abduction and ER to 4/5 for increased functional strength and activity.     Plan      Plan of care Certification: 1/7/2025 to 3/31/25.     Outpatient Physical Therapy 2 times weekly for 10 weeks to include the following interventions: Cervical/Lumbar Traction, Electrical Stimulation DN, Manual Therapy, Moist Heat/ Ice, Neuromuscular Re-ed, Patient Education, Therapeutic Activities, and Therapeutic Exercise.     Fabian Choi, PTA

## 2025-01-16 ENCOUNTER — CLINICAL SUPPORT (OUTPATIENT)
Dept: REHABILITATION | Facility: HOSPITAL | Age: 70
End: 2025-01-16
Payer: MEDICARE

## 2025-01-16 DIAGNOSIS — R29.898 UPPER EXTREMITY WEAKNESS: ICD-10-CM

## 2025-01-16 DIAGNOSIS — E11.9 TYPE 2 DIABETES MELLITUS WITHOUT COMPLICATION: ICD-10-CM

## 2025-01-16 DIAGNOSIS — R29.3 ABNORMAL POSTURE: ICD-10-CM

## 2025-01-16 DIAGNOSIS — M25.611 DECREASED RIGHT SHOULDER RANGE OF MOTION: Primary | ICD-10-CM

## 2025-01-16 DIAGNOSIS — E11.9 TYPE 2 DIABETES MELLITUS WITHOUT COMPLICATION, UNSPECIFIED WHETHER LONG TERM INSULIN USE: ICD-10-CM

## 2025-01-16 PROCEDURE — 97112 NEUROMUSCULAR REEDUCATION: CPT | Mod: HCNC,PO

## 2025-01-16 PROCEDURE — 97530 THERAPEUTIC ACTIVITIES: CPT | Mod: HCNC,PO

## 2025-01-16 NOTE — PROGRESS NOTES
Physical Therapy Daily Treatment Note     [facility]  [facility] Number: [id number]    Therapy Diagnosis:   Encounter Diagnoses   Name Primary?    Decreased right shoulder range of motion Yes    Abnormal posture     Upper extremity weakness      Physician: Nicole Kurtz, NP-C    Visit Date: 1/16/2025    Physician Orders: PT Eval and Treat   Medical Diagnosis from Referral: S49.90XD (ICD-10-CM) - Shoulder injury, unspecified laterality, subsequent encounter  Evaluation Date: 1/7/2025  Authorization Period Expiration: 12/31/25  Plan of Care Expiration: 3/31/25  Progress Note Due: 2/7/25  Date of Surgery: NA  Visit # / Visits authorized: 1/ 20  FOTO: 1/ 3       Time In: 225 pm  Time Out: 3:25 pm  Total Billable Time: 60 minutes (1:1 255-325 pm 30 min) TA 1 NMR 1    Precautions: [place]    Subjective     Pt reports: continued R shoulder moving better with able to most of the exercises with no issues.    He was compliant with home exercise program.  Response to previous treatment: less pain overall  Functional change: able to reach top of my head.    Pain: 2-3/10  Location: right shoulder      Objective     Objective measures displayed on progress notes unless otherwise noted       Treatment      Kleber received therapeutic exercises to develop strength, endurance, ROM, flexibility, posture and core stabilization for 6 minutes including:    Upper trap stretch 3 x 30 secs  Levator stretch 3 x 30 secs   Supine stretch towel roll x 3 min vertical  Supine stretch towel roll horizontal T2-T4 level x 3 min     Kleber received the following manual therapy techniques: Joint mobilizations, Manual traction, and Soft tissue Mobilization were applied to the:  for 00 minutes, including:      Kleber participated in neuromuscular re-education activities to improve: Balance, Coordination, Proprioception and Posture for 38 minutes. The following activities were included:    Scapular retraction 2 x 10 hold 3 secs  Cervical retraction  2 x 10 hold 3 secs  Supine horizontal abduction 2 x 15 red T band   Supine diagonals with red Tband 2 x 10   Supine IR/ ER x 30  Standing ER with YTB as tolerated 2 x 10  Rows standing green Tband 3 x 10  Shld extension red tband 2 x 10    Kleber participated in dynamic functional therapeutic activities to improve functional performance for 14  minutes, including:    Pulleys flex/abd x 2 minutes ea  Pulley eccentric lowering control on RUE x 1 min  Seated table slides at incline flex/scap x 30 ea  [place] 5# dowel 2 x 15 into flexion /scaption  and 2 x 10 scaption/abduction        Kleber received cold pack for 00 minutes to R shoulder.      Home Exercises Provided and Patient Education Provided     Education provided:   - HEP review    Written Home Exercises Provided: Patient instructed to cont prior HEP.  Exercises were reviewed and Kleber was able to demonstrate them prior to the end of the session.  Kleber demonstrated good  understanding of the education provided.     See EMR under Patient Instructions for exercises provided prior visit.    Assessment     Kleber with improved pain control and increased active Rom of shoulder with good overall exercise tolerance in session and good shoulder activation overhead.     Kleber Is progressing well towards his goals.   Pt prognosis is Good.     Pt will continue to benefit from skilled outpatient physical therapy to address the deficits listed in the problem list box on initial evaluation, provide pt/family education and to maximize pt's level of independence in the home and community environment.     Pt's spiritual, cultural and educational needs considered and pt agreeable to plan of care and goals.     Anticipated barriers to physical therapy: prolonged break in care     Goals: Short Term Goals: 3 weeks   Pt to be Independent with HEP for increased strength, endurance and functional mobility.  Pt to have decreased pain 2/10 for increased functional mobility and  tolerance.  Pt to increase AROM to 120 degrees R shoulder flexion and abduction for increased functional mobility.        Long Term Goals: 10 weeks   Pt to be Independent with pain management strategies and verbalize 2 for increased strength, endurance and functional mobility.  Pt to have decreased pain 0/10 for increased functional mobility and tolerance.  Pt to increase AROM to 155 degrees R shoulder flexion and abduction for increased functional mobility.  Pt to increase activity tolerance to 1 hrs for without increased pain for increased overall functional activity.  Pt to increase R shoulder flexion, abduction and ER to 4/5 for increased functional strength and activity.     Plan      Plan of care Certification: 1/7/2025 to 3/31/25.     Outpatient Physical Therapy 2 times weekly for 10 weeks to include the following interventions: Cervical/Lumbar Traction, Electrical Stimulation DN, Manual Therapy, Moist Heat/ Ice, Neuromuscular Re-ed, Patient Education, Therapeutic Activities, and Therapeutic Exercise.     Roxy Cotrez, PT

## 2025-01-19 ENCOUNTER — OFFICE VISIT (OUTPATIENT)
Dept: URGENT CARE | Facility: CLINIC | Age: 70
End: 2025-01-19
Payer: MEDICARE

## 2025-01-19 VITALS
TEMPERATURE: 98 F | WEIGHT: 185 LBS | OXYGEN SATURATION: 97 % | DIASTOLIC BLOOD PRESSURE: 65 MMHG | SYSTOLIC BLOOD PRESSURE: 107 MMHG | HEART RATE: 102 BPM | RESPIRATION RATE: 18 BRPM | BODY MASS INDEX: 29.73 KG/M2 | HEIGHT: 66 IN

## 2025-01-19 DIAGNOSIS — J02.9 SORE THROAT: ICD-10-CM

## 2025-01-19 DIAGNOSIS — R09.81 NASAL CONGESTION: ICD-10-CM

## 2025-01-19 DIAGNOSIS — R59.0 CERVICAL ADENOPATHY: ICD-10-CM

## 2025-01-19 DIAGNOSIS — J06.9 VIRAL URI: Primary | ICD-10-CM

## 2025-01-19 PROCEDURE — 99213 OFFICE O/P EST LOW 20 MIN: CPT | Mod: S$GLB,,, | Performed by: NURSE PRACTITIONER

## 2025-01-19 NOTE — PATIENT INSTRUCTIONS
INSTRUCTIONS:  - Rest.  - Drink plenty of fluids.  - Take Tylenol and/or Ibuprofen as directed as needed for fever/pain.  Do not take more than the recommended dose.  - follow up with your PCP within the next 1-2 weeks as needed.  - You must understand that you have received an Urgent Care treatment only and that you may be released before all of your medical problems are known or treated.   - You, the patient, will arrange for follow up care as instructed.   - If your condition worsens or fails to improve we recommend that you receive another evaluation at the ER immediately or contact your PCP to discuss your concerns.   - You can call [phone] or [phone] to help schedule an appointment with the appropriate provider.     -If you smoke cigarettes, it would be beneficial for you to stop.

## 2025-01-19 NOTE — PROGRESS NOTES
"Subjective:      Patient ID: Donald Warren Abadie is a 69 y.o. male.    Vitals:  height is 5' 6" (1.676 m) and weight is 83.9 kg (185 lb). His oral temperature is 98.2 °F (36.8 °C). His blood pressure is 107/65 and his pulse is 102. His respiration is 18 and oxygen saturation is 97%.     Chief Complaint: Neck Swelling    Pt presents with c/o swollen gland on left side X 3 days. Pt states has taken zyrtec, dayquil and Nyquil. Pain score 7/10  Pt states has had a cold for over 1 week.      Head and Neck Mass:   Chronicity:  New  Onset:  In the past 7 days  Progression since onset:  Unchanged  Frequency:  Constantly  Pain Scale:  7/10  Severity:  Moderate  Duration:  Off/on all day  Mass characteristics:  Hard   Associated symptoms: Sore throat and sore throat.  No chills, no ear pain, no fever, no headaches, no dysphasia, no hemoptysis, no myalgias, no nasal congestion, no postnasal drip, no hoarse voice, no immunosuppression, no shortness of breath, no sweats, no weight loss and no infection.  Aggravated by:  Nothing  Treatments tried:  NSAID's  Improvement on treatment:  No reliefNo asthma, no bronchiectasis, no bronchitis, no COPD, no emphysema, no environmental allergies and no pneumonia.      Constitution: Negative. Negative for chills, fatigue and fever.   HENT:  Positive for congestion, sinus pressure and sore throat. Negative for ear pain, ear discharge, facial swelling and postnasal drip.    Neck: Negative for neck pain, neck stiffness, painful lymph nodes and neck swelling.   Cardiovascular: Negative.  Negative for chest pain and sob on exertion.   Eyes: Negative.  Negative for eye itching, eye pain and eye redness.   Respiratory:  Negative for chest tightness, cough, bloody sputum, shortness of breath, wheezing and asthma.    Gastrointestinal: Negative.  Negative for abdominal pain, nausea and vomiting.   Endocrine: negative. excessive thirst.   Genitourinary: Negative.  Negative for dysuria, frequency, " urgency and flank pain.   Musculoskeletal: Negative.  Negative for pain, trauma, joint pain, joint swelling and muscle ache.   Skin: Negative.  Negative for rash, wound, lesion and hives.   Allergic/Immunologic: Negative.  Negative for environmental allergies, eczema, asthma, hives, itching and sneezing.   Neurological: Negative.  Negative for dizziness, passing out, headaches, disorientation and altered mental status.   Hematologic/Lymphatic: Negative.  Negative for swollen lymph nodes.   Psychiatric/Behavioral: Negative.  Negative for altered mental status, disorientation and confusion.       Objective:     Physical Exam   Constitutional: He is oriented to person, place, and time. He appears well-developed. He is cooperative.  Non-toxic appearance. He does not appear ill. No distress.   HENT:   Head: Normocephalic and atraumatic.   Ears:   Right Ear: Hearing, tympanic membrane, external ear and ear canal normal. no impacted cerumen  Left Ear: Hearing, tympanic membrane, external ear and ear canal normal. no impacted cerumen  Nose: Mucosal edema and congestion present. No rhinorrhea or nasal deformity. No epistaxis. Right sinus exhibits no maxillary sinus tenderness and no frontal sinus tenderness. Left sinus exhibits no maxillary sinus tenderness and no frontal sinus tenderness.   Mouth/Throat: Uvula is midline and mucous membranes are normal. No trismus in the jaw. Normal dentition. No uvula swelling. Posterior oropharyngeal erythema present. No oropharyngeal exudate, posterior oropharyngeal edema, tonsillar abscesses or cobblestoning. Tonsils are 0 on the right. Tonsils are 0 on the left. No tonsillar exudate.   Eyes: Conjunctivae and lids are normal. No scleral icterus.   Neck: Trachea normal and phonation normal. Neck supple. No edema present. No erythema present. No neck rigidity present.   Cardiovascular: Normal rate, regular rhythm, normal heart sounds and normal pulses.   Pulmonary/Chest: Effort normal and  breath sounds normal. No stridor. No respiratory distress. He has no decreased breath sounds. He has no wheezes. He has no rhonchi. He has no rales. He exhibits no tenderness.   Abdominal: Normal appearance.   Musculoskeletal: Normal range of motion.         General: No deformity. Normal range of motion.   Lymphadenopathy:     He has cervical adenopathy.   Neurological: no focal deficit. He is alert, oriented to person, place, and time and at baseline. He exhibits normal muscle tone. Coordination normal.   Skin: Skin is warm, dry, intact, not diaphoretic and not pale.   Psychiatric: His speech is normal and behavior is normal. Mood, judgment and thought content normal.   Nursing note and vitals reviewed.      Assessment:     1. Viral URI    2. Sore throat    3. Nasal congestion    4. Cervical adenopathy        Plan:   FOLLOWUP  Follow up if symptoms worsen or fail to improve, for PLEASE CONTACT PCP OR CONTACT THE EMERGENCY ROOM..     PATIENT INSTRUCTIONS  Patient Instructions   INSTRUCTIONS:  - Rest.  - Drink plenty of fluids.  - Take Tylenol and/or Ibuprofen as directed as needed for fever/pain.  Do not take more than the recommended dose.  - follow up with your PCP within the next 1-2 weeks as needed.  - You must understand that you have received an Urgent Care treatment only and that you may be released before all of your medical problems are known or treated.   - You, the patient, will arrange for follow up care as instructed.   - If your condition worsens or fails to improve we recommend that you receive another evaluation at the ER immediately or contact your PCP to discuss your concerns.   - You can call [phone] or [phone] to help schedule an appointment with the appropriate provider.     -If you smoke cigarettes, it would be beneficial for you to stop.        THANK YOU FOR ALLOWING ME TO PARTICIPATE IN YOUR HEALTHCARE,     Ramon Youssef, ZULEMA     Viral URI    Sore throat    Nasal  congestion    Cervical adenopathy

## 2025-01-20 DIAGNOSIS — E87.5 SERUM POTASSIUM ELEVATED: Primary | ICD-10-CM

## 2025-01-24 ENCOUNTER — TELEPHONE (OUTPATIENT)
Dept: INTERNAL MEDICINE | Facility: CLINIC | Age: 70
End: 2025-01-24
Payer: MEDICARE

## 2025-01-24 NOTE — TELEPHONE ENCOUNTER
----- Message from Bacilio Larry MD sent at 1/20/2025  6:41 AM CST -----  Regarding: Low sodium and elevated potassium  Please contact patient or his daughter, Brenda Abadie, I would like to repeat a BMP.  His potassium was a little elevated and his sodium was a little low.  This is not a fasting test.  An order is in.  ----- Message -----  From: Nicole Kurtz, NP-C  Sent: 12/27/2024   6:52 PM CST  To: Bacilio Larry MD    FYI

## 2025-01-28 ENCOUNTER — CLINICAL SUPPORT (OUTPATIENT)
Dept: REHABILITATION | Facility: HOSPITAL | Age: 70
End: 2025-01-28
Payer: MEDICARE

## 2025-01-28 DIAGNOSIS — R29.3 ABNORMAL POSTURE: ICD-10-CM

## 2025-01-28 DIAGNOSIS — M25.611 DECREASED RIGHT SHOULDER RANGE OF MOTION: Primary | ICD-10-CM

## 2025-01-28 DIAGNOSIS — R29.898 UPPER EXTREMITY WEAKNESS: ICD-10-CM

## 2025-01-28 PROCEDURE — 97530 THERAPEUTIC ACTIVITIES: CPT | Mod: HCNC,PO,CQ

## 2025-01-28 PROCEDURE — 97112 NEUROMUSCULAR REEDUCATION: CPT | Mod: HCNC,PO,CQ

## 2025-01-28 NOTE — PROGRESS NOTES
Physical Therapy Daily Treatment Note     [facility]  [facility] Number: [id number]    Therapy Diagnosis:   Encounter Diagnoses   Name Primary?    Decreased right shoulder range of motion Yes    Abnormal posture     Upper extremity weakness      Physician: Nicole Kurtz, NP-C    Visit Date: 1/28/2025    Physician Orders: PT Eval and Treat   Medical Diagnosis from Referral: S49.90XD (ICD-10-CM) - Shoulder injury, unspecified laterality, subsequent encounter  Evaluation Date: 1/7/2025  Authorization Period Expiration: 12/31/25  Plan of Care Expiration: 3/31/25  Progress Note Due: 2/7/25  Date of Surgery: NA  Visit # / Visits authorized: 3/ 20  FOTO: 1/ 3       Time In: 2:10 pm (Late arrival)  Time Out: 3:00 pm  Total Billable Time: 50 minutes     Precautions: [place]    Subjective     Pt reports: mild R shoulder discomfort and notes being able to move it more and do more around the house since beginning PT.    He was compliant with home exercise program.  Response to previous treatment: less pain overall  Functional change: able to reach top of my head.    Pain: 3/10  Location: right shoulder      Objective     Objective measures displayed on progress notes unless otherwise noted       Treatment      Kleber received therapeutic exercises to develop strength, endurance, ROM, flexibility, posture and core stabilization for 6 minutes including:    Upper trap stretch 3 x 30 secs  Levator stretch 3 x 30 secs   Supine stretch towel roll x 3 min vertical  Supine stretch towel roll horizontal T2-T4 level x 3 min     Kleber received the following manual therapy techniques: Joint mobilizations, Manual traction, and Soft tissue Mobilization were applied to the:  for 00 minutes, including:      Kleber participated in neuromuscular re-education activities to improve: Balance, Coordination, Proprioception and Posture for 30 minutes. The following activities were included:    Scapular retraction 2 x 10 hold 3 secs  No Money  (no resistance) 2 x 10  Cervical retraction 2 x 10 hold 3 secs  Supine horizontal abduction 2 x 15 red T band   Supine diagonals with red Tband 2 x 10   Supine IR/ ER x 30  Standing ER with YTB as tolerated 2 x 10  Rows standing green Tband 3 x 10  Shld extension red tband 2 x 10  Serratus wall slides 2 x 10    Kleber participated in dynamic functional therapeutic activities to improve functional performance for 14  minutes, including:    Pulleys flex/abd x 2 minutes ea  Pulley eccentric lowering control on RUE x 1 min  Seated table slides at incline flex/scap x 30 ea  Landmines 5# dowel 2 x 15 into flexion /scaption  and 2 x 10 scaption/abduction        Kleber received cold pack for 00 minutes to R shoulder.      Home Exercises Provided and Patient Education Provided     Education provided:   - HEP review    Written Home Exercises Provided: Patient instructed to cont prior HEP.  Exercises were reviewed and Kleber was able to demonstrate them prior to the end of the session.  Kleber demonstrated good  understanding of the education provided.     See EMR under Patient Instructions for exercises provided prior visit.    Assessment   Kleber returned continuing to note improved functional R shoulder ROM and strengthening. Treatment progressed today by introducing no money and serratus wall slides. Exercise progressions tolerated well with pt demonstrating ER weakness with no money exercise. Pt was also quick to fatigue with landmine presses. Will monitor and attempt to progress per pt's tolerance.    Kleber Is progressing well towards his goals.   Pt prognosis is Good.     Pt will continue to benefit from skilled outpatient physical therapy to address the deficits listed in the problem list box on initial evaluation, provide pt/family education and to maximize pt's level of independence in the home and community environment.     Pt's spiritual, cultural and educational needs considered and pt agreeable to plan of care  and goals.     Anticipated barriers to physical therapy: prolonged break in care     Goals: Short Term Goals: 3 weeks   Pt to be Independent with HEP for increased strength, endurance and functional mobility.  Pt to have decreased pain 2/10 for increased functional mobility and tolerance.  Pt to increase AROM to 120 degrees R shoulder flexion and abduction for increased functional mobility.        Long Term Goals: 10 weeks   Pt to be Independent with pain management strategies and verbalize 2 for increased strength, endurance and functional mobility.  Pt to have decreased pain 0/10 for increased functional mobility and tolerance.  Pt to increase AROM to 155 degrees R shoulder flexion and abduction for increased functional mobility.  Pt to increase activity tolerance to 1 hrs for without increased pain for increased overall functional activity.  Pt to increase R shoulder flexion, abduction and ER to 4/5 for increased functional strength and activity.     Plan      Plan of care Certification: 1/7/2025 to 3/31/25.     Outpatient Physical Therapy 2 times weekly for 10 weeks to include the following interventions: Cervical/Lumbar Traction, Electrical Stimulation DN, Manual Therapy, Moist Heat/ Ice, Neuromuscular Re-ed, Patient Education, Therapeutic Activities, and Therapeutic Exercise.     Fabian Choi, PTA

## 2025-02-04 ENCOUNTER — CLINICAL SUPPORT (OUTPATIENT)
Dept: REHABILITATION | Facility: HOSPITAL | Age: 70
End: 2025-02-04
Payer: MEDICARE

## 2025-02-04 DIAGNOSIS — R29.898 UPPER EXTREMITY WEAKNESS: ICD-10-CM

## 2025-02-04 DIAGNOSIS — R29.3 ABNORMAL POSTURE: ICD-10-CM

## 2025-02-04 DIAGNOSIS — M25.611 DECREASED RIGHT SHOULDER RANGE OF MOTION: Primary | ICD-10-CM

## 2025-02-04 PROCEDURE — 97530 THERAPEUTIC ACTIVITIES: CPT | Mod: HCNC,PO,CQ

## 2025-02-04 PROCEDURE — 97112 NEUROMUSCULAR REEDUCATION: CPT | Mod: HCNC,PO,CQ

## 2025-02-04 NOTE — PROGRESS NOTES
"  Physical Therapy Daily Treatment Note     [facility]  [facility] Number: [id number]    Therapy Diagnosis:   Encounter Diagnoses   Name Primary?    Decreased right shoulder range of motion Yes    Abnormal posture     Upper extremity weakness      Physician: Nicole Kurtz, NP-C    Visit Date: 2/4/2025    Physician Orders: PT Eval and Treat   Medical Diagnosis from Referral: S49.90XD (ICD-10-CM) - Shoulder injury, unspecified laterality, subsequent encounter  Evaluation Date: 1/7/2025  Authorization Period Expiration: 12/31/25  Plan of Care Expiration: 3/31/25  Progress Note Due: 2/7/25  Date of Surgery: NA  Visit # / Visits authorized: 4/ 20  FOTO: 1/ 3       Time In: 1:10 pm (late arrival time)  Time Out: 2:57 pm  Total Time: 46 minutes  Total Billable Time: 23 minutes 1:1    Precautions: [place]    Subjective     Pt reports: "I'm still stiff from last week's workout".    He was compliant with home exercise program.  Response to previous treatment: increased stiffness and muscular soreness  Functional change: able to reach top of my head.    Pain: 210  Location: right shoulder      Objective     Objective measures displayed on progress notes unless otherwise noted       Treatment      Kleber received therapeutic exercises to develop strength, endurance, ROM, flexibility, posture and core stabilization for 6 minutes including:    Upper trap stretch 3 x 30 secs  Levator stretch 3 x 30 secs   Supine stretch towel roll x 3 min vertical  Supine stretch towel roll horizontal T2-T4 level x 3 min     Kleber received the following manual therapy techniques: Joint mobilizations, Manual traction, and Soft tissue Mobilization were applied to the:  for 00 minutes, including:      Kleber participated in neuromuscular re-education activities to improve: Balance, Coordination, Proprioception and Posture for 30 minutes. The following activities were included:    Scapular retraction 2 x 10 hold 3 secs  No Money (no resistance) " 2 x 10  Cervical retraction 2 x 10 hold 3 secs  Supine horizontal abduction 2 x 15 red T band   Supine diagonals with red Tband 2 x 10 (No resistance today; caused pain)  Supine IR/ ER x 30  Standing ER with YTB as tolerated 2 x 10  Rows standing green Tband 3 x 10  Shld extension red tband 2 x 10  Serratus wall slides 2 x 10    Kleber participated in dynamic functional therapeutic activities to improve functional performance for 10 minutes, including:    Pulleys flex/abd x 2 minutes ea  Pulley eccentric lowering control on RUE x 1 min  Seated table slides at incline flex/scap x 30 ea  Landmines 5# dowel 2 x 15 into flexion /scaption  and 2 x 10 scaption/abduction        Kleber received cold pack for 00 minutes to R shoulder.      Home Exercises Provided and Patient Education Provided     Education provided:   - HEP review    Written Home Exercises Provided: Patient instructed to cont prior HEP.  Exercises were reviewed and Kleber was able to demonstrate them prior to the end of the session.  Kleber demonstrated good  understanding of the education provided.     See EMR under Patient Instructions for exercises provided prior visit.    Assessment   Frequent verbal and tactile cueing required for most exercises to ensure proper form and to decrease compensatory movements. Pt experienced anterior R shoulder pain with supine diagonal exercise. Resistance on this exercise was removed with pt noted increased comfort. Will monitor and attempt to progress per pt's tolerance.    Kleber Is progressing well towards his goals.   Pt prognosis is Good.     Pt will continue to benefit from skilled outpatient physical therapy to address the deficits listed in the problem list box on initial evaluation, provide pt/family education and to maximize pt's level of independence in the home and community environment.     Pt's spiritual, cultural and educational needs considered and pt agreeable to plan of care and goals.     Anticipated  barriers to physical therapy: prolonged break in care     Goals: Short Term Goals: 3 weeks   Pt to be Independent with HEP for increased strength, endurance and functional mobility.  Pt to have decreased pain 2/10 for increased functional mobility and tolerance.  Pt to increase AROM to 120 degrees R shoulder flexion and abduction for increased functional mobility.        Long Term Goals: 10 weeks   Pt to be Independent with pain management strategies and verbalize 2 for increased strength, endurance and functional mobility.  Pt to have decreased pain 0/10 for increased functional mobility and tolerance.  Pt to increase AROM to 155 degrees R shoulder flexion and abduction for increased functional mobility.  Pt to increase activity tolerance to 1 hrs for without increased pain for increased overall functional activity.  Pt to increase R shoulder flexion, abduction and ER to 4/5 for increased functional strength and activity.     Plan      Plan of care Certification: 1/7/2025 to 3/31/25.     Outpatient Physical Therapy 2 times weekly for 10 weeks to include the following interventions: Cervical/Lumbar Traction, Electrical Stimulation DN, Manual Therapy, Moist Heat/ Ice, Neuromuscular Re-ed, Patient Education, Therapeutic Activities, and Therapeutic Exercise.     Fabian Choi, PTA

## 2025-02-06 ENCOUNTER — CLINICAL SUPPORT (OUTPATIENT)
Dept: REHABILITATION | Facility: HOSPITAL | Age: 70
End: 2025-02-06
Payer: MEDICARE

## 2025-02-06 DIAGNOSIS — R29.898 UPPER EXTREMITY WEAKNESS: ICD-10-CM

## 2025-02-06 DIAGNOSIS — R29.3 ABNORMAL POSTURE: ICD-10-CM

## 2025-02-06 DIAGNOSIS — M25.611 DECREASED RIGHT SHOULDER RANGE OF MOTION: Primary | ICD-10-CM

## 2025-02-06 PROCEDURE — 97530 THERAPEUTIC ACTIVITIES: CPT | Mod: HCNC,PO

## 2025-02-06 PROCEDURE — 97112 NEUROMUSCULAR REEDUCATION: CPT | Mod: HCNC,PO

## 2025-02-07 ENCOUNTER — ANESTHESIA (OUTPATIENT)
Dept: ENDOSCOPY | Facility: HOSPITAL | Age: 70
End: 2025-02-07
Payer: MEDICARE

## 2025-02-07 ENCOUNTER — HOSPITAL ENCOUNTER (OUTPATIENT)
Facility: HOSPITAL | Age: 70
Discharge: HOME | End: 2025-02-07
Attending: INTERNAL MEDICINE | Admitting: INTERNAL MEDICINE
Payer: MEDICARE

## 2025-02-07 ENCOUNTER — ANESTHESIA EVENT (OUTPATIENT)
Dept: ENDOSCOPY | Facility: HOSPITAL | Age: 70
End: 2025-02-07
Payer: MEDICARE

## 2025-02-07 VITALS
HEART RATE: 60 BPM | TEMPERATURE: 98 F | SYSTOLIC BLOOD PRESSURE: 122 MMHG | BODY MASS INDEX: 29.73 KG/M2 | WEIGHT: 185 LBS | RESPIRATION RATE: 19 BRPM | OXYGEN SATURATION: 98 % | HEIGHT: 66 IN | DIASTOLIC BLOOD PRESSURE: 65 MMHG

## 2025-02-07 DIAGNOSIS — K86.2 PANCREAS CYST: ICD-10-CM

## 2025-02-07 LAB
POCT GLUCOSE: 103 MG/DL (ref 70–110)
POCT GLUCOSE: 116 MG/DL (ref 70–110)

## 2025-02-07 PROCEDURE — 43259 EGD US EXAM DUODENUM/JEJUNUM: CPT | Mod: HCNC | Performed by: INTERNAL MEDICINE

## 2025-02-07 PROCEDURE — 37000008 HC ANESTHESIA 1ST 15 MINUTES: Mod: HCNC | Performed by: INTERNAL MEDICINE

## 2025-02-07 PROCEDURE — D9220A PRA ANESTHESIA: Mod: HCNC,CRNA,,

## 2025-02-07 PROCEDURE — 43259 EGD US EXAM DUODENUM/JEJUNUM: CPT | Mod: HCNC,,, | Performed by: INTERNAL MEDICINE

## 2025-02-07 PROCEDURE — 37000009 HC ANESTHESIA EA ADD 15 MINS: Mod: HCNC | Performed by: INTERNAL MEDICINE

## 2025-02-07 PROCEDURE — 82962 GLUCOSE BLOOD TEST: CPT | Mod: HCNC | Performed by: INTERNAL MEDICINE

## 2025-02-07 PROCEDURE — 63600175 PHARM REV CODE 636 W HCPCS: Mod: HCNC

## 2025-02-07 PROCEDURE — D9220A PRA ANESTHESIA: Mod: HCNC,ANES,, | Performed by: ANESTHESIOLOGY

## 2025-02-07 PROCEDURE — 25000003 PHARM REV CODE 250: Mod: HCNC

## 2025-02-07 RX ORDER — LIDOCAINE HYDROCHLORIDE 20 MG/ML
INJECTION INTRAVENOUS
Status: DISCONTINUED | OUTPATIENT
Start: 2025-02-07 | End: 2025-02-07

## 2025-02-07 RX ORDER — GLUCAGON 1 MG
1 KIT INJECTION
Status: DISCONTINUED | OUTPATIENT
Start: 2025-02-07 | End: 2025-02-07 | Stop reason: HOSPADM

## 2025-02-07 RX ORDER — METOPROLOL TARTRATE 25 MG/1
25 TABLET, FILM COATED ORAL 2 TIMES DAILY
COMMUNITY

## 2025-02-07 RX ORDER — ONDANSETRON HYDROCHLORIDE 2 MG/ML
4 INJECTION, SOLUTION INTRAVENOUS DAILY PRN
Status: DISCONTINUED | OUTPATIENT
Start: 2025-02-07 | End: 2025-02-07 | Stop reason: HOSPADM

## 2025-02-07 RX ORDER — SODIUM CHLORIDE 9 MG/ML
INJECTION, SOLUTION INTRAVENOUS CONTINUOUS
Status: DISCONTINUED | OUTPATIENT
Start: 2025-02-07 | End: 2025-02-07 | Stop reason: HOSPADM

## 2025-02-07 RX ORDER — PROPOFOL 10 MG/ML
VIAL (ML) INTRAVENOUS CONTINUOUS PRN
Status: DISCONTINUED | OUTPATIENT
Start: 2025-02-07 | End: 2025-02-07

## 2025-02-07 RX ADMIN — PROPOFOL 50 MG: 10 INJECTION, EMULSION INTRAVENOUS at 03:02

## 2025-02-07 RX ADMIN — GLYCOPYRROLATE 0.2 MG: 0.2 INJECTION, SOLUTION INTRAMUSCULAR; INTRAVENOUS at 03:02

## 2025-02-07 RX ADMIN — PROPOFOL 125 MCG/KG/MIN: 10 INJECTION, EMULSION INTRAVENOUS at 03:02

## 2025-02-07 RX ADMIN — LIDOCAINE HYDROCHLORIDE 100 MG: 20 INJECTION INTRAVENOUS at 03:02

## 2025-02-07 RX ADMIN — SODIUM CHLORIDE: 0.9 INJECTION, SOLUTION INTRAVENOUS at 03:02

## 2025-02-07 NOTE — TRANSFER OF CARE
"Anesthesia Transfer of Care Note    Patient: Donald Warren Abadie    Procedure(s) Performed: Procedure(s) (LRB):  EGD (ESOPHAGOGASTRODUODENOSCOPY) (N/A)  ULTRASOUND, UPPER GI TRACT, ENDOSCOPIC (N/A)    Patient location: [facility]    Anesthesia Type: general    Transport from OR: Transported from OR on 2-3 L/min O2 by NC with adequate spontaneous ventilation    Post pain: adequate analgesia    Post assessment: no apparent anesthetic complications and tolerated procedure well    Post vital signs: stable    Level of consciousness: responds to stimulation    Nausea/Vomiting: no nausea/vomiting    Complications: none    Transfer of care protocol was followed      Last vitals: Visit Vitals  /60   Pulse (!) 57   Temp 37.2 °C (99 °F)   Resp 17   Ht 5' 6" (1.676 m)   Wt 83.9 kg (185 lb)   SpO2 97%   BMI 29.86 kg/m²     "

## 2025-02-07 NOTE — ANESTHESIA PREPROCEDURE EVALUATION
"                                                                                                             02/07/2025  Donald Warren Abadie is a 69 y.o., male.    Past Medical History:   Diagnosis Date    A-fib     Alcohol abuse     Anxiety     Arthritis     Chronic gout     Diabetes mellitus     DM (diabetes mellitus) 11/16/2016    "boarderline, not taking any meds currently"    Fatty liver     Hyperlipidemia     Renal cell cancer 2014    S/P TKR (total knee replacement), right 06/27/2019       Past Surgical History:   Procedure Laterality Date    ABSCESS DRAINAGE      perirectal    COLONOSCOPY      ENDOSCOPIC ULTRASOUND OF UPPER GASTROINTESTINAL TRACT N/A 6/11/2024    Procedure: ULTRASOUND, UPPER GI TRACT, ENDOSCOPIC;  Surgeon: Mode Wood MD;  Location: [facility] ENDO;  Service: Endoscopy;  Laterality: N/A;  6/7 portal-EUS in 7-10 days please, [facility] or [place] approved  Te 6/7-tt  6/10-precall complete-[place]    HAND SURGERY Left     JOINT REPLACEMENT      knee replacement right knee    KIDNEY SURGERY      partial left kidney removal - [place]    PARTIAL NEPHRECTOMY Left August 2014    PERCUTANEOUS CRYOTHERAPY OF PERIPHERAL NERVE USING LIQUID NITROUS OXIDE IN CLOSED NEEDLE DEVICE Right 6/17/2019    Procedure: CRYOTHERAPY, NERVE, PERIPHERAL, PERCUTANEOUS, USING LIQUID NITROUS OXIDE IN CLOSED NEEDLE DEVICE-right knee iovera;  Surgeon: Donny Hair III, MD;  Location: [facility] CATH LAB;  Service: Pain Management;  Laterality: Right;    TOTAL KNEE ARTHROPLASTY Right 6/27/2019    Procedure: ARTHROPLASTY, KNEE, TOTAL-SAME DAY;  Surgeon: Mikael Huerta MD;  Location: [facility] OR [address] [place] [address];  Service: Orthopedics;  Laterality: Right;           Pre-op Assessment          Review of Systems  Anesthesia Hx:  No problems with previous Anesthesia   History of prior surgery of interest to airway management or planning:          Denies Family Hx of Anesthesia complications.    Denies Personal Hx of Anesthesia " complications.                    Cardiovascular:  Exercise tolerance: good   Hypertension   Denies MI.        Denies Angina. CHF      Denies REY.                              Pulmonary:  Pulmonary Normal   Denies COPD.  Denies Asthma.    Denies Recent URI.                 Hepatic/GI:     GERD (no symptoms currently), well controlled                Psych:     H/o alcohol abuse, sober x 2 months               Physical Exam  General: Alert, Oriented and Well nourished    Airway:  Mallampati: II   Mouth Opening: Normal  TM Distance: Normal  Neck ROM: Normal ROM    Chest/Lungs:  Normal Respiratory Rate    Heart:  Rate: Normal  Rhythm: Regular Rhythm        Anesthesia Plan  Type of Anesthesia, risks & benefits discussed:    Anesthesia Type: Gen ETT, Gen Natural Airway  Intra-op Monitoring Plan: Standard ASA Monitors  Post Op Pain Control Plan: multimodal analgesia and IV/PO Opioids PRN  Informed Consent: Informed consent signed with the Patient and all parties understand the risks and agree with anesthesia plan.  All questions answered.   ASA Score: 3  Day of Surgery Review of History & Physical: H&P Update referred to the surgeon/provider.    Ready For Surgery From Anesthesia Perspective.     .

## 2025-02-07 NOTE — H&P
Short Stay Endoscopy History and Physical    PCP - Bacilio Larry MD  Referring Physician - oMde Wood MD  [address]  [address]  [place] [address]    Procedure - eus/egd  ASA - per anesthesia  Mallampati - per anesthesia  History of Anesthesia problems - no  Family history Anesthesia problems -  no   Plan of anesthesia - General    HPI:  This is a 69 y.o. male here for evaluation of: pancreas cyst    Reflux - no  Dysphagia - no  Abdominal pain - no  Diarrhea - no    ROS:  Constitutional: No fevers, chills, No weight loss  CV: No chest pain  Pulm: No cough, No shortness of breath  Ophtho: No vision changes  GI: see HPI  Derm: No rash    Medical History:  has a past medical history of A-fib, Alcohol abuse, Anxiety, Arthritis, Chronic gout, Diabetes mellitus, DM (diabetes mellitus) (11/16/2016), Fatty liver, Hyperlipidemia, Renal cell cancer (2014), and S/P TKR (total knee replacement), right (06/27/2019).    Surgical History:  has a past surgical history that includes Abscess drainage; Hand surgery (Left); Partial nephrectomy (Left, August 2014); Colonoscopy; Kidney surgery; Percutaneous cryotherapy of peripheral nerve using liquid nitrous oxide in closed needle device (Right, 6/17/2019); Total knee arthroplasty (Right, 6/27/2019); Joint replacement; and Endoscopic ultrasound of upper gastrointestinal tract (N/A, 6/11/2024).    Family History: family history includes Alcohol abuse in his brother; Alzheimer's disease in his brother and mother; Cancer in his father and sister; Colon cancer in his father; Colon polyps in his brother; Diabetes in his mother; Lung cancer in his father and sister..    Social History:  reports that he has never smoked. He has never used smokeless tobacco. He reports current alcohol use of about 168.0 standard drinks of alcohol per week. He reports that he does not use drugs.    Review of patient's allergies indicates:   Allergen Reactions    No known drug allergies         Medications:   Medications Prior to Admission   Medication Sig Dispense Refill Last Dose/Taking    allopurinoL (ZYLOPRIM) 100 MG tablet Take 2 tablets (200 mg total) by mouth once daily. 180 tablet 0 2/6/2025    apixaban (ELIQUIS) 5 mg Tab Take 1 tablet (5 mg total) by mouth 2 (two) times daily. 180 tablet 3 2/4/2025    cyanocobalamin (VITAMIN B-12) 1000 MCG tablet Take 1 tablet (1,000 mcg total) by mouth once daily.   Past Month    dronedarone (MULTAQ) 400 mg Tab Take 1 tablet (400 mg total) by mouth once daily. 30 tablet 11 2/7/2025    folic acid (FOLVITE) 1 MG tablet Take 1 tablet (1 mg total) by mouth once daily. 30 tablet 11 Past Month    metFORMIN (GLUCOPHAGE-XR) 500 MG ER 24hr tablet Take 2 tablets (1,000 mg total) by mouth 2 (two) times daily with meals. 360 tablet 3 2/6/2025    metoprolol tartrate (LOPRESSOR) 25 MG tablet Take 25 mg by mouth 2 (two) times daily.   2/6/2025    pantoprazole (PROTONIX) 40 MG tablet Take 1 tablet (40 mg total) by mouth once daily. 90 tablet 3 2/6/2025    rOPINIRole (REQUIP) 1 MG tablet TAKE 1 TABLET(1 MG) BY MOUTH EVERY EVENING 90 tablet 3 Past Week    rosuvastatin (CRESTOR) 20 MG tablet TAKE 1 TABLET(20 MG) BY MOUTH EVERY DAY 90 tablet 3 2/6/2025    sertraline (ZOLOFT) 100 MG tablet Take 1 tablet (100 mg total) by mouth once daily. 90 tablet 3 2/6/2025    thiamine (VITAMIN B-1) 100 MG tablet Take 1 tablet (100 mg total) by mouth once daily. 30 tablet 2 Past Month    diclofenac sodium (VOLTAREN) 1 % Gel Apply 2 g topically 3 (three) times daily as needed (Right knee - hand pain).       methylPREDNISolone (MEDROL DOSEPACK) 4 mg tablet use as directed 21 tablet 0 More than a month       Physical Exam:    Vital Signs:   Vitals:    02/07/25 1454   BP:    Pulse: (!) 57   Resp:    Temp:        General Appearance: Well appearing in no acute distress    Labs:  Lab Results   Component Value Date    WBC 10.13 12/27/2024    HGB 14.4 12/27/2024    HCT 43.1 12/27/2024      12/27/2024    CHOL 143 12/27/2024    TRIG 128 12/27/2024    HDL 32 (L) 12/27/2024    ALT 15 12/27/2024    AST 23 12/27/2024     (L) 12/27/2024    K 5.3 (H) 12/27/2024    CL 95 12/27/2024    CREATININE 1.4 12/27/2024    BUN 15 12/27/2024    CO2 24 12/27/2024    TSH 2.089 10/29/2023    PSA 3.8 08/09/2017    INR 1.1 09/20/2024    GLUF 122 (H) 04/21/2010    HGBA1C 6.6 (H) 12/27/2024       I have explained the risks and benefits of this endoscopic procedure to the patient including but not limited to bleeding, inflammation, infection, perforation, and death.      Mode Wood MD

## 2025-02-07 NOTE — PROVATION PATIENT INSTRUCTIONS
Discharge Summary/Instructions after an Endoscopic Procedure  Patient Name: Donald Abadie  Patient MRN: [id number]  Patient YOB: 1955 Friday, February 7, 2025  Mode Wood MD  Dear patient,  As a result of recent federal legislation (The Federal Cures Act), you may   receive lab or pathology results from your procedure in your [facility]   account before your physician is able to contact you. Your physician or   their representative will relay the results to you with their   recommendations at their soonest availability.  Thank you,  RESTRICTIONS:  During your procedure today, you received medications for sedation.  These   medications may affect your judgment, balance and coordination.  Therefore,   for 24 hours, you have the following restrictions:   - DO NOT drive a car, operate machinery, make legal/financial decisions,   sign important papers or drink alcohol.    ACTIVITY:  Today: no heavy lifting, straining or running due to procedural   sedation/anesthesia.  The following day: return to full activity including work.  DIET:  Eat and drink normally unless instructed otherwise.     TREATMENT FOR COMMON SIDE EFFECTS:  - Mild abdominal pain, nausea, belching, bloating or excessive gas:  rest,   eat lightly and use a heating pad.  - Sore Throat: treat with throat lozenges and/or gargle with warm salt   water.  - Because air was used during the procedure, expelling large amounts of air   from your rectum or belching is normal.  - If a bowel prep was taken, you may not have a bowel movement for 1-3 days.    This is normal.  SYMPTOMS TO WATCH FOR AND REPORT TO YOUR PHYSICIAN:  1. Abdominal pain or bloating, other than gas cramps.  2. Chest pain.  3. Back pain.  4. Signs of infection such as: chills or fever occurring within 24 hours   after the procedure.  5. Rectal bleeding, which would show as bright red, maroon, or black stools.   (A tablespoon of blood from the rectum is not serious, especially if    hemorrhoids are present.)  6. Vomiting.  7. Weakness or dizziness.  GO DIRECTLY TO THE NEAREST EMERGENCY ROOM IF YOU HAVE ANY OF THE FOLLOWING:      Difficulty breathing              Chills and/or fever over 101 F   Persistent vomiting and/or vomiting blood   Severe abdominal pain   Severe chest pain   Black, tarry stools   Bleeding- more than one tablespoon   Any other symptom or condition that you feel may need urgent attention  Your doctor recommends these additional instructions:  If any biopsies were taken, your doctors clinic will contact you in 1 to 2   weeks with any results.  - Discharge patient to home.   - Resume previous diet.   - Continue present medications.   - Discharge patient to home.   - Resume previous diet.   - Continue present medications.   - Perform magnetic resonance imaging (MRI) with gadolinium in 1 year.  For questions, problems or results please call your physician - Mode Wood MD at Work:  [phone].  [facility], EMERGENCY ROOM PHONE NUMBER: [phone]  IF A COMPLICATION OR EMERGENCY SITUATION ARISES AND YOU ARE UNABLE TO REACH   YOUR PHYSICIAN - GO DIRECTLY TO THE EMERGENCY ROOM.  Mode Wood MD  2/7/2025 3:32:46 PM  This report has been verified and signed electronically.  Dear patient,  As a result of recent federal legislation (The Federal Cures Act), you may   receive lab or pathology results from your procedure in your [facility]   account before your physician is able to contact you. Your physician or   their representative will relay the results to you with their   recommendations at their soonest availability.  Thank you,  PROVATION

## 2025-02-08 NOTE — PROGRESS NOTES
"  Physical Therapy Daily Treatment Note     Name: Kleber Schuster Abadie  Clinic Number: [id number]    Therapy Diagnosis:   Encounter Diagnoses   Name Primary?    Decreased right shoulder range of motion Yes    Abnormal posture     Upper extremity weakness      Physician: Nicole Kurtz, NP-C    Visit Date: 2/6/2025    Physician Orders: PT Eval and Treat   Medical Diagnosis from Referral: S49.90XD (ICD-10-CM) - Shoulder injury, unspecified laterality, subsequent encounter  Evaluation Date: 1/7/2025  Authorization Period Expiration: 12/31/25  Plan of Care Expiration: 3/31/25  Progress Note Due: 2/7/25  Date of Surgery: NA  Visit # / Visits authorized: 4/ 20  FOTO: 1/ 3       Time In: 212 pm (late arrival time)  Time Out: 2:57 pm  Total Time: 45 minutes  Total Billable Time: 38 minutes 1:1 (212-250 38 min)    Precautions: [place]    Subjective     Pt reports: "I'm still stiff from last week's workout".    He was compliant with home exercise program.  Response to previous treatment: increased stiffness and muscular soreness  Functional change: able to reach top of my head.    Pain: 210  Location: right shoulder      Objective     Objective measures displayed on progress notes unless otherwise noted       Treatment      Kleber received therapeutic exercises to develop strength, endurance, ROM, flexibility, posture and core stabilization for 5 minutes including:    Upper trap stretch 3 x 30 secs  Levator stretch 3 x 30 secs   Supine stretch towel roll x 3 min vertical  Supine stretch towel roll horizontal T2-T4 level x 3 min     Kleber received the following manual therapy techniques: Joint mobilizations, Manual traction, and Soft tissue Mobilization were applied to the:  for 00 minutes, including:      Kleber participated in neuromuscular re-education activities to improve: Balance, Coordination, Proprioception and Posture for 30 minutes. The following activities were included:    Scapular retraction 2 x 10 hold 3 secs  No Money " (no resistance) 2 x 10  Cervical retraction 2 x 10 hold 3 secs  Supine horizontal abduction 2 x 15 red T band   Supine diagonals with red Tband 2 x 10 (No resistance today; caused pain)  Supine IR/ ER x 30  Standing ER with YTB as tolerated 2 x 10  Rows standing green Tband 3 x 10  Shld extension red tband 2 x 10  Serratus wall slides 1 x 10    Kleber participated in dynamic functional therapeutic activities to improve functional performance for 10 minutes, including:    Pulleys flex/abd x 2 minutes ea  Pulley eccentric lowering control on RUE x 1 min  Seated table slides at incline flex/scap x 30 ea  [place] 5# dowel 2 x 15 into flexion /scaption  and 2 x 10 scaption/abduction        Kleber received cold pack for 00 minutes to R shoulder.      Home Exercises Provided and Patient Education Provided     Education provided:   - HEP review    Written Home Exercises Provided: Patient instructed to cont prior HEP.  Exercises were reviewed and Kleber was able to demonstrate them prior to the end of the session.  Kleber demonstrated good  understanding of the education provided.     See EMR under Patient Instructions for exercises provided prior visit.    Assessment   Pt with marked hiking on arrival but improved with cues and good control increasing stabilization and mechanics in session as tolerated with good execution progressively    Kleber Is progressing well towards his goals.   Pt prognosis is Good.     Pt will continue to benefit from skilled outpatient physical therapy to address the deficits listed in the problem list box on initial evaluation, provide pt/family education and to maximize pt's level of independence in the home and community environment.     Pt's spiritual, cultural and educational needs considered and pt agreeable to plan of care and goals.     Anticipated barriers to physical therapy: prolonged break in care     Goals: Short Term Goals: 3 weeks   Pt to be Independent with HEP for increased  strength, endurance and functional mobility.  Pt to have decreased pain 2/10 for increased functional mobility and tolerance.  Pt to increase AROM to 120 degrees R shoulder flexion and abduction for increased functional mobility.        Long Term Goals: 10 weeks   Pt to be Independent with pain management strategies and verbalize 2 for increased strength, endurance and functional mobility.  Pt to have decreased pain 0/10 for increased functional mobility and tolerance.  Pt to increase AROM to 155 degrees R shoulder flexion and abduction for increased functional mobility.  Pt to increase activity tolerance to 1 hrs for without increased pain for increased overall functional activity.  Pt to increase R shoulder flexion, abduction and ER to 4/5 for increased functional strength and activity.     Plan      Plan of care Certification: 1/7/2025 to 3/31/25.     Outpatient Physical Therapy 2 times weekly for 10 weeks to include the following interventions: Cervical/Lumbar Traction, Electrical Stimulation DN, Manual Therapy, Moist Heat/ Ice, Neuromuscular Re-ed, Patient Education, Therapeutic Activities, and Therapeutic Exercise.     Roxy Cortez, PT

## 2025-02-08 NOTE — ANESTHESIA POSTPROCEDURE EVALUATION
Anesthesia Post Evaluation    Patient: Donald Warren Abadie    Procedure(s) Performed: Procedure(s) (LRB):  EGD (ESOPHAGOGASTRODUODENOSCOPY) (N/A)  ULTRASOUND, UPPER GI TRACT, ENDOSCOPIC (N/A)    Final Anesthesia Type: general      Patient location during evaluation: PACU  Patient participation: Yes- Able to Participate  Level of consciousness: awake and alert  Post-procedure vital signs: reviewed and stable  Pain management: adequate  Airway patency: patent    PONV status at discharge: No PONV  Anesthetic complications: no      Cardiovascular status: blood pressure returned to baseline  Respiratory status: unassisted, room air and spontaneous ventilation  Hydration status: euvolemic  Follow-up not needed.              Vitals Value Taken Time   /65 02/07/25 1617   Temp 36.4 °C (97.6 °F) 02/07/25 1535   Pulse 60 02/07/25 1630   Resp 19 02/07/25 1630   SpO2 98 % 02/07/25 1630         No case tracking events are documented in the log.      Pain/French Score: French Score: 10 (2/7/2025  4:15 PM)

## 2025-02-11 ENCOUNTER — CLINICAL SUPPORT (OUTPATIENT)
Dept: REHABILITATION | Facility: HOSPITAL | Age: 70
End: 2025-02-11
Payer: MEDICARE

## 2025-02-11 DIAGNOSIS — R29.3 ABNORMAL POSTURE: ICD-10-CM

## 2025-02-11 DIAGNOSIS — R29.898 UPPER EXTREMITY WEAKNESS: ICD-10-CM

## 2025-02-11 DIAGNOSIS — M25.611 DECREASED RIGHT SHOULDER RANGE OF MOTION: Primary | ICD-10-CM

## 2025-02-11 PROCEDURE — 97112 NEUROMUSCULAR REEDUCATION: CPT | Mod: HCNC,PO,CQ

## 2025-02-11 PROCEDURE — 97530 THERAPEUTIC ACTIVITIES: CPT | Mod: HCNC,PO,CQ

## 2025-02-11 NOTE — PROGRESS NOTES
Physical Therapy Daily Treatment Note     [facility]  [facility] Number: [id number]    Therapy Diagnosis:   Encounter Diagnoses   Name Primary?    Decreased right shoulder range of motion Yes    Abnormal posture     Upper extremity weakness      Physician: Nicole Kurtz, NP-C    Visit Date: 2/11/2025    Physician Orders: PT Eval and Treat   Medical Diagnosis from Referral: S49.90XD (ICD-10-CM) - Shoulder injury, unspecified laterality, subsequent encounter  Evaluation Date: 1/7/2025  Authorization Period Expiration: 12/31/25  Plan of Care Expiration: 3/31/25  Progress Note Due: 2/7/25  Date of Surgery: NA  Visit # / Visits authorized: 4/ 20  FOTO: 1/ 3       Time In: 2:00 pm   Time Out: 2:53 pm  Total Time: 53 minutes  Total Billable Time: 26 minutes 1:1     Precautions: [place]    Subjective     Pt reports: continued min R shoulder pain levels but continues to note improved functional mobility in R shoulder.    He was compliant with home exercise program.  Response to previous treatment: increased stiffness and muscular soreness  Functional change: able to reach top of my head.    Pain: 2/10  Location: right shoulder      Objective     Objective measures displayed on progress notes unless otherwise noted       Treatment      Kleber received therapeutic exercises to develop strength, endurance, ROM, flexibility, posture and core stabilization for 5 minutes including:    Upper trap stretch 3 x 30 secs  Levator stretch 3 x 30 secs   Supine stretch towel roll x 3 min vertical  Supine stretch towel roll horizontal T2-T4 level x 3 min     Kleber received the following manual therapy techniques: Joint mobilizations, Manual traction, and Soft tissue Mobilization were applied to the:  for 00 minutes, including:      Kleber participated in neuromuscular re-education activities to improve: Balance, Coordination, Proprioception and Posture for 35 minutes. The following activities were included:    Scapular retraction 2  x 10 hold 3 secs  No Money RTB 2 x 10  Cervical retraction 2 x 10 hold 3 secs  Supine horizontal abduction 2 x 15 red T band   Supine diagonals with red Tband 2 x 10 (No resistance today; caused pain)  Supine IR/ ER x 30  Standing ER with YTB as tolerated 2 x 10  Rows standing green Tband 3 x 10  Shld extension red tband 2 x 10  Serratus wall slides 1 x 10    Kleber participated in dynamic functional therapeutic activities to improve functional performance for 13 minutes, including:    Pulleys flex/abd x 2 minutes ea  Pulley eccentric lowering control on RUE x 1 min  Seated table slides at incline flex/scap x 30 ea  [place] 5# dowel 2 x 15 into flexion /scaption  and 2 x 10 scaption/abduction +2# ankle weight around wrist        Kleber received cold pack for 00 minutes to R shoulder.      Home Exercises Provided and Patient Education Provided     Education provided:   - HEP review    Written Home Exercises Provided: Patient instructed to cont prior HEP.  Exercises were reviewed and Kleber was able to demonstrate them prior to the end of the session.  Kleber demonstrated good  understanding of the education provided.     See EMR under Patient Instructions for exercises provided prior visit.    Assessment   Frequent verbal cueing continues to be required with overhead motions and during no money exercise, but improves with cueing. Pt demonstrated difficulty/weakness with standing ER as he was only able to perform exercise with one yellow theraband handle. Desired training effect achieved with no adverse effects. Will continue to progress per pt's tolerance.    Kleber Is progressing well towards his goals.   Pt prognosis is Good.     Pt will continue to benefit from skilled outpatient physical therapy to address the deficits listed in the problem list box on initial evaluation, provide pt/family education and to maximize pt's level of independence in the home and community environment.     Pt's spiritual, cultural  and educational needs considered and pt agreeable to plan of care and goals.     Anticipated barriers to physical therapy: prolonged break in care     Goals: Short Term Goals: 3 weeks   Pt to be Independent with HEP for increased strength, endurance and functional mobility.  Pt to have decreased pain 2/10 for increased functional mobility and tolerance.  Pt to increase AROM to 120 degrees R shoulder flexion and abduction for increased functional mobility.        Long Term Goals: 10 weeks   Pt to be Independent with pain management strategies and verbalize 2 for increased strength, endurance and functional mobility.  Pt to have decreased pain 0/10 for increased functional mobility and tolerance.  Pt to increase AROM to 155 degrees R shoulder flexion and abduction for increased functional mobility.  Pt to increase activity tolerance to 1 hrs for without increased pain for increased overall functional activity.  Pt to increase R shoulder flexion, abduction and ER to 4/5 for increased functional strength and activity.     Plan      Plan of care Certification: 1/7/2025 to 3/31/25.     Outpatient Physical Therapy 2 times weekly for 10 weeks to include the following interventions: Cervical/Lumbar Traction, Electrical Stimulation DN, Manual Therapy, Moist Heat/ Ice, Neuromuscular Re-ed, Patient Education, Therapeutic Activities, and Therapeutic Exercise.     Fabian Choi, PTA

## 2025-02-13 ENCOUNTER — CLINICAL SUPPORT (OUTPATIENT)
Dept: REHABILITATION | Facility: HOSPITAL | Age: 70
End: 2025-02-13
Payer: MEDICARE

## 2025-02-13 DIAGNOSIS — R29.3 ABNORMAL POSTURE: ICD-10-CM

## 2025-02-13 DIAGNOSIS — M25.611 DECREASED RIGHT SHOULDER RANGE OF MOTION: Primary | ICD-10-CM

## 2025-02-13 DIAGNOSIS — R29.898 UPPER EXTREMITY WEAKNESS: ICD-10-CM

## 2025-02-13 PROCEDURE — 97530 THERAPEUTIC ACTIVITIES: CPT | Mod: HCNC,PO,CQ

## 2025-02-13 PROCEDURE — 97112 NEUROMUSCULAR REEDUCATION: CPT | Mod: HCNC,PO,CQ

## 2025-02-13 NOTE — PROGRESS NOTES
"  Physical Therapy Daily Treatment Note     [facility]  Cl[facility] Number: [id number]    Therapy Diagnosis:   Encounter Diagnoses   Name Primary?    Decreased right shoulder range of motion Yes    Abnormal posture     Upper extremity weakness      Physician: Nicole Kurtz, NP-C    Visit Date: 2/13/2025    Physician Orders: PT Eval and Treat   Medical Diagnosis from Referral: S49.90XD (ICD-10-CM) - Shoulder injury, unspecified laterality, subsequent encounter  Evaluation Date: 1/7/2025  Authorization Period Expiration: 12/31/25  Plan of Care Expiration: 3/31/25  Progress Note Due: 2/7/25  Date of Surgery: NA  Visit # / Visits authorized: 7/ 20  FOTO: 2/ 3       Time In: 2:08 pm   Time Out: 3:01 pm  Total Time: 53 minutes  Total Billable Time: 53 minutes     Precautions: [place]    Subjective     Pt reports: no R shoulder pain currently and states " I can sleep on that side now, I couldn't do that before".    He was compliant with home exercise program.  Response to previous treatment: no adverse effects  Functional change: able to reach top of my head.    Pain: 0/10  Location: right shoulder      Objective     Objective measures displayed on progress notes unless otherwise noted       Treatment      Kleber received therapeutic exercises to develop strength, endurance, ROM, flexibility, posture and core stabilization for 5 minutes including:    Upper trap stretch 3 x 30 secs  Levator stretch 3 x 30 secs   Supine stretch towel roll x 3 min vertical  Supine stretch towel roll horizontal T2-T4 level x 3 min     Kleber received the following manual therapy techniques: Joint mobilizations, Manual traction, and Soft tissue Mobilization were applied to the:  for 00 minutes, including:      Kleebr participated in neuromuscular re-education activities to improve: Balance, Coordination, Proprioception and Posture for 38 minutes. The following activities were included:    Scapular retraction 2 x 10 hold 3 secs  No Money RTB " 2 x 10  Cervical retraction 2 x 10 hold 3 secs  Supine horizontal abduction 2 x 15 red T band   Supine diagonals with red Tband 2 x 10 (No resistance today; caused pain)  Supine IR/ ER x 30  Standing ER with YTB as tolerated 2 x 10  Rows standing green Tband 3 x 10  Shld extension red tband 2 x 10  Serratus wall slides 1 x 10    Kleber participated in dynamic functional therapeutic activities to improve functional performance for 10 minutes, including:    Pulleys flex/abd x 2 minutes ea  Pulley eccentric lowering control on RUE x 1 min  Seated table slides at incline flex/scap x 30 ea  [place] 5# dowel 2 x 15 into flexion /scaption  and 2 x 10 scaption/abduction +2# ankle weight around wrist        Kleber received cold pack for 00 minutes to R shoulder.      Home Exercises Provided and Patient Education Provided     Education provided:   - HEP review    Written Home Exercises Provided: Patient instructed to cont prior HEP.  Exercises were reviewed and Kleber was able to demonstrate them prior to the end of the session.  Kleber demonstrated good  understanding of the education provided.     See EMR under Patient Instructions for exercises provided prior visit.    Assessment   Kleber returned without complaints of R shoulder pain at rest. Pt demonstrates strength deficit with active ER having difficulty with both standing ER and no money. Frequent verbal cueing continued to required to slow pace for better motor control.FOTO score improved from 20% to 55% indicating decreased pain levels and improved function. Will continue to progress per pt's tolerance.    Kleber Is progressing well towards his goals.   Pt prognosis is Good.     Pt will continue to benefit from skilled outpatient physical therapy to address the deficits listed in the problem list box on initial evaluation, provide pt/family education and to maximize pt's level of independence in the home and community environment.     Pt's spiritual, cultural and  educational needs considered and pt agreeable to plan of care and goals.     Anticipated barriers to physical therapy: prolonged break in care     Goals: Short Term Goals: 3 weeks   Pt to be Independent with HEP for increased strength, endurance and functional mobility.  Pt to have decreased pain 2/10 for increased functional mobility and tolerance.  Pt to increase AROM to 120 degrees R shoulder flexion and abduction for increased functional mobility.        Long Term Goals: 10 weeks   Pt to be Independent with pain management strategies and verbalize 2 for increased strength, endurance and functional mobility.  Pt to have decreased pain 0/10 for increased functional mobility and tolerance.  Pt to increase AROM to 155 degrees R shoulder flexion and abduction for increased functional mobility.  Pt to increase activity tolerance to 1 hrs for without increased pain for increased overall functional activity.  Pt to increase R shoulder flexion, abduction and ER to 4/5 for increased functional strength and activity.     Plan      Plan of care Certification: 1/7/2025 to 3/31/25.     Outpatient Physical Therapy 2 times weekly for 10 weeks to include the following interventions: Cervical/Lumbar Traction, Electrical Stimulation DN, Manual Therapy, Moist Heat/ Ice, Neuromuscular Re-ed, Patient Education, Therapeutic Activities, and Therapeutic Exercise.     Fabian Choi, PTA

## 2025-02-17 ENCOUNTER — PATIENT MESSAGE (OUTPATIENT)
Dept: CARDIOLOGY | Facility: CLINIC | Age: 70
End: 2025-02-17
Payer: MEDICARE

## 2025-02-17 ENCOUNTER — TELEPHONE (OUTPATIENT)
Dept: CARDIOLOGY | Facility: CLINIC | Age: 70
End: 2025-02-17
Payer: MEDICARE

## 2025-02-17 ENCOUNTER — TELEPHONE (OUTPATIENT)
Dept: ELECTROPHYSIOLOGY | Facility: CLINIC | Age: 70
End: 2025-02-17
Payer: MEDICARE

## 2025-02-17 NOTE — TELEPHONE ENCOUNTER
----- Message from MARLEEN Bennett sent at 2/17/2025 12:58 PM CST -----  Regarding: FW: Medication concerns  I was not able to reach pt's daughter (first contact) and spoke w. pt. Poor historian.Pt c/o HR irregular and gets as high as 110. Told pt that I will notify dr Angel' team and he verbalized understanding.Message sent to dr Angel' team.Pt states that his BP has gotten high at times w. apparently diastolic 110-115 (calls it the 2nd number)  but systolic 115-130? Told pt to get me exact readings as such readings would be erroneous. SBP 115s today.Pt states that he will call me back w. actual readings.  ----- Message -----  From: Bianca Benites RN  Sent: 2/17/2025  12:21 PM CST  To: Sabrina Gaona RN  Subject: FW: Medication concerns                            ----- Message -----  From: Es Patel  Sent: 2/14/2025   3:48 PM CST  To: Bianca Benites RN  Subject: Medication concerns                              Chani [phone] pt daughter says she would like to speak to her dad nurse in reference to his medication because she thinks it needs to be adjusted. His blood pressure drops with some of his meds and he goes into Afib with some of his meds. She would like a call today since today is Friday please.    LOV 11/7/24 Huong Piña     Thanks

## 2025-02-17 NOTE — TELEPHONE ENCOUNTER
Spoke to pt's daughter. She said when pt takes Multaq 400 mg BID then his HR stays in 50, 90 SBP, and he feels lightheaded the next morning. If he only takes it once (as he has been the past 3 weeks), then a fib is happening more and HR 110s. She stated he hasn't been able to take Metoprolol with the low HR. When he goes into a fib, he'll take metoprolol 25 mg (1/2 tablet daily). Pt not drinking anymore. She feels like his Multaq needs to be adjusted and she would like a f/u appt to discuss an ablation. Advised I will speak to Dr. Angel to see what he recommends and then call her back. Told her I will also send a message to get the pt scheduled. Patient's daughter verbalized understanding and had no further questions.

## 2025-02-17 NOTE — TELEPHONE ENCOUNTER
Daughter called me back from my first call this Am. She did speak to the arrhythmia team but states that meds still need to be addressed due to pt unable to take metoprolol w. HR 50s, states other meds need to be adjusted. EP staff scheduled pt for f/u w. dr Angel in May and she feels like it is too far in the future. I scheduled pt in Am w. Ms Barillas to address and daughter agreed to date/time of appointment(s). Note: pt did not go to ER, his HR came back down.

## 2025-02-17 NOTE — TELEPHONE ENCOUNTER
Pt called back. He says that he just got dizzy when bending over: /78, hr 110.  He gave me a bunch of readings, none being high.  Those readings were done on different days when feeling poorly:  SBP/DBP, HR  99/74, 134  107/71, 125  104/83, 119  103/76, 114.    I told him to go to ER if EP does not get back to him within the hour and not drive himself. He verbalized understanding.

## 2025-02-17 NOTE — PROGRESS NOTES
Mr. Abadie is a patient of Dr. Angel and was last seen in clinic 4/30/2024.      Subjective:   Patient ID:  Donald Warren Abadie is a 69 y.o. male who presents for follow up of Atrial Fibrillation  .     HPI:    Mr. Abadie is a 69 y.o. male with AF/AFL ETOH dependence, low normal EF (50%), small SDH after fall, bradycardia here for follow up.     Background:    Primary Cardiologist: Uriel Tolentino MD  Primary Care Physician: Bacilio Larry MD     Mr. Abadie has a hx of paroxysmal atrial fibrillation/flutter, alcohol dependence, and mildly reduce LVEF (45-50% in 2022) who follows up for AF. He was previously taking prn flecainide with good control of his symptoms however he presented in 10/2023 with AF with RVR in setting of alcohol relapse. He spontaneously converted. Plan was to start multaq 3 days later and follow-up in clinic. PVI had been offered in the past. Multaq never filled due to cost. Recently has had ER visits for symptomatic pAF with RVR. Amiodarone started.    4/30/2024: In-clinic ECG is sinus rhythm with a narrow QRS    In summary, Mr. Abadie is a 68 year-old man, former patient of Dr. Giang, with paroxysmal atrial fibrillation/flutter, alcohol dependence, and mildly reduce LVEF (45-50% in 2022) who follows up for AF. Discussed PVI as an alternative to drug therapy. I spent about a half hour discussing the nature of PVI including transseptal puncture. We discussed risks and benefits at length. Our discussion included, but was not limited to the risk of death, infection, bleeding, stroke, MI, cardiac perforation, embolism, cardiac tamponade, vascular injury, valvular injury, AE fistula, injury to phrenic nerve, pulmonary vein stenosis and other organic injury including the possibility for need for surgery or pacemaker implantation.  Discussed long-term issues with amiodarone. Sotalol would be an alternative choice at some point.  He wants to think about the ablation.    Update  (02/18/2025):    8/5/2024: Admitted to  s/p fall in the setting of alcohol intoxication.     9/2024: SDH from fall (Patient was going over a bump on his way to a clinic appointment and the chair he was then folded up, resulting in a fall. )  ontacted by radiology with initial CT head read-thin subdural bleeding noted, no mass effect.  Case discussed with Neurosurgery.  Neurosurgery recommending SBP less than 160, no need for reversal of Eliquis, repeat CT head in 6 hours.    11/19/2024: The patient was admitted to hospital medicine for further management of his acute alcohol withdrawals.     11/28/2024: The patient was admitted to hospital medicine for further management of his acute alcohol withdrawals     2/17/2025: Pt's daughter called and says     Today he says he has not been drinking since November (went to rehab). Was off meds during that time. When he got home he restarted meds but AF continues. He has been taking multaq once daily due to gabino (LH when HR 50) but has been experiencing increased AF episodes. Says he is very symptomatic with episodes. Palps, SOB. No recent falls.  No ETOH. Also cut out ice tea and coke.     He is currently taking eliquis 5mg BID for stroke prophylaxis. He is taking multaq irregularly (generally daily). Taking metoprolol irregularly.  Kidney function is stable, with a creatinine of 1.4 on 12/17/2024.    I have personally reviewed the patient's EKG today, which shows sinus rhythm at 61bpm. VT interval is 176. QRS is 86. QTc is 420.    Relevant Cardiac Test Results:    2D Echo (11/15/2024):    Left Ventricle: The left ventricle is normal in size. Normal wall thickness. There is concentric remodeling. There is low normal systolic function with a visually estimated ejection fraction of 50 - 55%. There is normal diastolic function.    Right Ventricle: Normal right ventricular cavity size. Wall thickness is normal. Systolic function is normal.    Mild biatrial enlargement     Aortic Valve: The aortic valve is a trileaflet valve. There is mild aortic valve sclerosis.    Pulmonary Artery: The estimated pulmonary artery systolic pressure is 25 mmHg.    IVC/SVC: Normal venous pressure at 3 mmHg.    Current Outpatient Medications   Medication Sig    allopurinoL (ZYLOPRIM) 100 MG tablet Take 2 tablets (200 mg total) by mouth once daily.    apixaban (ELIQUIS) 5 mg Tab Take 1 tablet (5 mg total) by mouth 2 (two) times daily.    diclofenac sodium (VOLTAREN) 1 % Gel Apply 2 g topically 3 (three) times daily as needed (Right knee - hand pain).    dronedarone (MULTAQ) 400 mg Tab Take 1 tablet (400 mg total) by mouth once daily.    metFORMIN (GLUCOPHAGE-XR) 500 MG ER 24hr tablet Take 2 tablets (1,000 mg total) by mouth 2 (two) times daily with meals. (Patient taking differently: Take 1,000 mg by mouth 2 (two) times a day.)    pantoprazole (PROTONIX) 40 MG tablet Take 1 tablet (40 mg total) by mouth once daily.    rOPINIRole (REQUIP) 1 MG tablet TAKE 1 TABLET(1 MG) BY MOUTH EVERY EVENING (Patient taking differently: as needed.)    rosuvastatin (CRESTOR) 20 MG tablet TAKE 1 TABLET(20 MG) BY MOUTH EVERY DAY    sertraline (ZOLOFT) 100 MG tablet Take 1 tablet (100 mg total) by mouth once daily.    cyanocobalamin (VITAMIN B-12) 1000 MCG tablet Take 1 tablet (1,000 mcg total) by mouth once daily. (Patient not taking: Reported on 2/18/2025)    folic acid (FOLVITE) 1 MG tablet Take 1 tablet (1 mg total) by mouth once daily. (Patient not taking: Reported on 2/18/2025)    methylPREDNISolone (MEDROL DOSEPACK) 4 mg tablet use as directed    metoprolol tartrate (LOPRESSOR) 25 MG tablet Take 25 mg by mouth 2 (two) times daily. (Patient not taking: Reported on 2/18/2025)    thiamine (VITAMIN B-1) 100 MG tablet Take 1 tablet (100 mg total) by mouth once daily. (Patient not taking: Reported on 2/18/2025)     No current facility-administered medications for this visit.       Review of Systems   Constitutional: Negative  "for malaise/fatigue.   Cardiovascular:  Positive for dyspnea on exertion, irregular heartbeat and palpitations. Negative for chest pain and leg swelling.   Respiratory:  Positive for shortness of breath.    Hematologic/Lymphatic: Negative for bleeding problem.   Skin:  Negative for rash.   Musculoskeletal:  Negative for myalgias.   Gastrointestinal:  Negative for hematemesis, hematochezia and nausea.   Genitourinary:  Negative for hematuria.   Neurological:  Positive for light-headedness.   Psychiatric/Behavioral:  Negative for altered mental status.    Allergic/Immunologic: Negative for persistent infections.       Objective:          /65 (BP Location: Right arm, Patient Position: Sitting)   Pulse 61   Ht 5' 6" (1.676 m)   Wt 83.4 kg (183 lb 13.8 oz)   BMI 29.68 kg/m²     Physical Exam  Vitals and nursing note reviewed.   Constitutional:       Appearance: Normal appearance. He is well-developed.   HENT:      Head: Normocephalic.      Nose: Nose normal.   Eyes:      Pupils: Pupils are equal, round, and reactive to light.   Cardiovascular:      Rate and Rhythm: Normal rate and regular rhythm.   Pulmonary:      Effort: No respiratory distress.   Musculoskeletal:         General: Normal range of motion.   Skin:     General: Skin is warm and dry.      Findings: No erythema.   Neurological:      Mental Status: He is alert and oriented to person, place, and time.   Psychiatric:         Speech: Speech normal.         Behavior: Behavior normal.           Lab Results   Component Value Date     (L) 12/27/2024    K 5.3 (H) 12/27/2024    MG 1.5 (L) 12/27/2024    BUN 15 12/27/2024    CREATININE 1.4 12/27/2024    ALT 15 12/27/2024    AST 23 12/27/2024    HGB 14.4 12/27/2024    HCT 43.1 12/27/2024    HCT 46 05/02/2024    TSH 2.089 10/29/2023    LDLCALC 85.4 12/27/2024       Recent Labs   Lab 10/28/23  1603 09/20/24  1117   INR 1.0 1.1       Assessment:     1. Paroxysmal atrial fibrillation    2. 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 Primary hypertension    4. Severe obesity (BMI 35.0-39.9) with comorbidity    5. On anticoagulant therapy    6. On Multaq therapy        Plan:     In summary, Mr. Abadie is a 69 y.o. male with AF/AFL ETOH dependence, low normal EF (50%), small SDH after fall, bradycardia here for follow up.   Since last clinic visit 4/2024, pt had multiple falls due to ETOH. In Sept one fall resulted in small SDH. He is now off alcohol (mult ED visits for ETOH withdrawal in Nov). On multaq but complains of bradycardia on full dose and increased AF on partial dose. His AF is very symptomatic. He is interested in ablation to get off AAD. We discussed risks and benefits at length. Our discussion included, but was not limited to the risk of death, infection, bleeding, stroke, MI, cardiac perforation, embolism, cardiac tamponade, vascular injury, valvular injury, AE fistula, injury to phrenic nerve, pulmonary vein stenosis and other organic injury including the possibility for need for surgery. Pt and daughter verbalized understanding. Per daughter, pt has a hx of both AF and AFL.  On eliquis for CVA prophylaxis.    Confirm PVI and RFA of CTI with Dr. Angel  (UPDATE: Will proceed:  PVI and CTI ablation with RFA   Carto for mapping   JOHN day of, cancel if in sinus   Anesthesia   Hold eliquis AM of procedure   Ok to continue multaq)   Continue current meds for now  RTC as scheduled    *A copy of this note has been sent to Dr. Angel*    Follow up as scheduled.      ------------------------------------------------------------------    DARCY Garcia, NP-C  Cardiac Electrophysiology

## 2025-02-17 NOTE — TELEPHONE ENCOUNTER
----- Message from MARLEEN Bennett sent at 2/17/2025 12:55 PM CST -----  Regarding: irregular/ elevated HR  Pt c/o HR irregular and gets as high as 110. Told pt that I will notify dr Angel' team and he verbalized understanding.BP issue to be addressed in another thread.Please adviseSabrina,General Cardiology Triage RN  ----- Message -----  From: Bianca Benites RN  Sent: 2/17/2025  12:21 PM CST  To: Sabrina Gaona RN  Subject: FW: Medication concerns                            ----- Message -----  From: Es Patel  Sent: 2/14/2025   3:48 PM CST  To: Bianca Benites RN  Subject: Medication concerns                              Chani [phone] pt daughter says she would like to speak to her dad nurse in reference to his medication because she thinks it needs to be adjusted. His blood pressure drops with some of his meds and he goes into Afib with some of his meds. She would like a call today since today is Friday please.    LOV 11/7/24 Huong Piña     Thanks

## 2025-02-17 NOTE — TELEPHONE ENCOUNTER
----- Message from MARLEEN Bennett sent at 2/17/2025 12:55 PM CST -----  Regarding: irregular/ elevated HR  Pt c/o HR irregular and gets as high as 110. Told pt that I will notify dr Angel' team and he verbalized understanding.BP issue to be addressed in another thread.Please adviseSabrina,General Cardiology Triage RN  ----- Message -----  From: Bianca Benites RN  Sent: 2/17/2025  12:21 PM CST  To: Sabrina Gaona RN  Subject: FW: Medication concerns                            ----- Message -----  From: Es Patel  Sent: 2/14/2025   3:48 PM CST  To: Bianca Benites RN  Subject: Medication concerns                              Chani [phone] pt daughter says she would like to speak to her dad nurse in reference to his medication because she thinks it needs to be adjusted. His blood pressure drops with some of his meds and he goes into Afib with some of his meds. She would like a call today since today is Friday please.    LOV 11/7/24 Huong Piña     Thanks

## 2025-02-17 NOTE — TELEPHONE ENCOUNTER
Left message advising Dr. Angel wants to get pt into clinic as there's no alternative dose to Multaq. Advised appt scheduled for tomorrow at 1:30 pm.

## 2025-02-17 NOTE — TELEPHONE ENCOUNTER
----- Message from Susie sent at 2/17/2025  2:34 PM CST -----  Please schedule pt for f/u appt with Dr. Angel.

## 2025-02-18 ENCOUNTER — OFFICE VISIT (OUTPATIENT)
Dept: ELECTROPHYSIOLOGY | Facility: CLINIC | Age: 70
End: 2025-02-18
Payer: MEDICARE

## 2025-02-18 ENCOUNTER — HOSPITAL ENCOUNTER (OUTPATIENT)
Dept: CARDIOLOGY | Facility: CLINIC | Age: 70
Discharge: HOME | End: 2025-02-18
Payer: MEDICARE

## 2025-02-18 VITALS
DIASTOLIC BLOOD PRESSURE: 65 MMHG | BODY MASS INDEX: 29.55 KG/M2 | WEIGHT: 183.88 LBS | SYSTOLIC BLOOD PRESSURE: 100 MMHG | HEART RATE: 61 BPM | HEIGHT: 66 IN

## 2025-02-18 DIAGNOSIS — I48.0 PAROXYSMAL ATRIAL FIBRILLATION: ICD-10-CM

## 2025-02-18 DIAGNOSIS — I48.0 PAROXYSMAL ATRIAL FIBRILLATION: Primary | ICD-10-CM

## 2025-02-18 DIAGNOSIS — I10 PRIMARY HYPERTENSION: ICD-10-CM

## 2025-02-18 DIAGNOSIS — Z79.899 ON MULTAQ THERAPY: ICD-10-CM

## 2025-02-18 DIAGNOSIS — I50.22 HEART FAILURE WITH MILDLY REDUCED EJECTION FRACTION (HFMREF): ICD-10-CM

## 2025-02-18 DIAGNOSIS — E66.01 SEVERE OBESITY (BMI 35.0-39.9) WITH COMORBIDITY: ICD-10-CM

## 2025-02-18 DIAGNOSIS — Z79.01 ON ANTICOAGULANT THERAPY: ICD-10-CM

## 2025-02-18 LAB
OHS QRS DURATION: 86 MS
OHS QTC CALCULATION: 420 MS

## 2025-02-18 PROCEDURE — 93005 ELECTROCARDIOGRAM TRACING: CPT | Mod: HCNC,S$GLB,, | Performed by: INTERNAL MEDICINE

## 2025-02-18 PROCEDURE — 93010 ELECTROCARDIOGRAM REPORT: CPT | Mod: HCNC,S$GLB,, | Performed by: INTERNAL MEDICINE

## 2025-02-18 NOTE — Clinical Note
Pt with hx of AF/AFL, ETOH abuse now off ETOH since Nov (rehab) and is unable to tolerate full multaq dose due to gabino and reports increased symptomatic AF. He is interested in PVI to get off AAD if you agree. thx

## 2025-02-19 ENCOUNTER — TELEPHONE (OUTPATIENT)
Dept: INTERNAL MEDICINE | Facility: CLINIC | Age: 70
End: 2025-02-19
Payer: MEDICARE

## 2025-02-19 NOTE — TELEPHONE ENCOUNTER
----- Message from Yasmin sent at 2/19/2025  1:12 PM CST -----  Contact: self   .1MEDICALADVICE Patient is calling for Medical Advice regarding:a previous visit How long has patient had these symptoms:n/aPharmacy name and phone#:n/aPatient wants a call back or thru [facility]:call back Comments:Patient is requesting a call back from Dr Larry regarding a previous visitPlease advise patient replies from provider may take up to 48 hours.

## 2025-02-19 NOTE — TELEPHONE ENCOUNTER
Pt stated he would like to do a urine test so that you can write a letter. He is asking if you can order the urine so he can do his urine in [place] tomorrow. Please advise.

## 2025-02-20 ENCOUNTER — LAB VISIT (OUTPATIENT)
Dept: LAB | Facility: HOSPITAL | Age: 70
End: 2025-02-20
Attending: INTERNAL MEDICINE
Payer: MEDICARE

## 2025-02-20 ENCOUNTER — CLINICAL SUPPORT (OUTPATIENT)
Dept: REHABILITATION | Facility: HOSPITAL | Age: 70
End: 2025-02-20
Payer: MEDICARE

## 2025-02-20 ENCOUNTER — TELEPHONE (OUTPATIENT)
Dept: INTERNAL MEDICINE | Facility: CLINIC | Age: 70
End: 2025-02-20
Payer: MEDICARE

## 2025-02-20 ENCOUNTER — RESULTS FOLLOW-UP (OUTPATIENT)
Dept: INTERNAL MEDICINE | Facility: CLINIC | Age: 70
End: 2025-02-20
Payer: MEDICARE

## 2025-02-20 DIAGNOSIS — E11.9 TYPE 2 DIABETES MELLITUS WITHOUT COMPLICATION: ICD-10-CM

## 2025-02-20 DIAGNOSIS — M25.611 DECREASED RIGHT SHOULDER RANGE OF MOTION: Primary | ICD-10-CM

## 2025-02-20 DIAGNOSIS — F55.8 ABUSE OF OTHER NON-PSYCHOACTIVE SUBSTANCES: ICD-10-CM

## 2025-02-20 DIAGNOSIS — F10.20 ALCOHOLISM: Primary | ICD-10-CM

## 2025-02-20 DIAGNOSIS — R29.898 UPPER EXTREMITY WEAKNESS: ICD-10-CM

## 2025-02-20 DIAGNOSIS — R29.3 ABNORMAL POSTURE: ICD-10-CM

## 2025-02-20 LAB
ALBUMIN/CREAT UR: 17.7 UG/MG (ref 0–30)
CREAT UR-MCNC: 147 MG/DL (ref 23–375)
MICROALBUMIN UR DL<=1MG/L-MCNC: 26 UG/ML

## 2025-02-20 PROCEDURE — 82043 UR ALBUMIN QUANTITATIVE: CPT | Mod: HCNC | Performed by: INTERNAL MEDICINE

## 2025-02-20 PROCEDURE — 97112 NEUROMUSCULAR REEDUCATION: CPT | Mod: HCNC,PO,CQ

## 2025-02-20 PROCEDURE — 97530 THERAPEUTIC ACTIVITIES: CPT | Mod: HCNC,PO,CQ

## 2025-02-20 NOTE — PROGRESS NOTES
"  Physical Therapy Daily Treatment Note     Name: Donald Warren Abadie  Clinic Number: [id number]    Therapy Diagnosis:   Encounter Diagnoses   Name Primary?    Decreased right shoulder range of motion Yes    Abnormal posture     Upper extremity weakness      Physician: Nicole Kurtz, NP-C    Visit Date: 2/20/2025    Physician Orders: PT Eval and Treat   Medical Diagnosis from Referral: S49.90XD (ICD-10-CM) - Shoulder injury, unspecified laterality, subsequent encounter  Evaluation Date: 1/7/2025  Authorization Period Expiration: 12/31/25  Plan of Care Expiration: 3/31/25  Progress Note Due: 2/7/25  Date of Surgery: NA  Visit # / Visits authorized: 7/ 20  FOTO: 2/ 3       Time In: 2:03 pm   Time Out: 2:58 pm  Total Time: 55 minutes  Total Billable Time: 55 minutes     Precautions: ANA    Subjective     Pt reports: "it's feeling a little more stiff today".    He was compliant with home exercise program.  Response to previous treatment: no adverse effects  Functional change: able to reach top of my head.    Pain: 0/10  Location: right shoulder      Objective     Objective measures displayed on progress notes unless otherwise noted       Treatment      Kleber received therapeutic exercises to develop strength, endurance, ROM, flexibility, posture and core stabilization for 5 minutes including:    Upper trap stretch 3 x 30 secs  Levator stretch 3 x 30 secs   Supine stretch towel roll x 3 min vertical  Supine stretch towel roll horizontal T2-T4 level x 3 min     Kleber received the following manual therapy techniques: Joint mobilizations, Manual traction, and Soft tissue Mobilization were applied to the:  for 00 minutes, including:      Kleber participated in neuromuscular re-education activities to improve: Balance, Coordination, Proprioception and Posture for 38 minutes. The following activities were included:    Scapular retraction 2 x 10 hold 3 secs  No Money RTB 2 x 10  Cervical retraction 2 x 10 hold 3 secs  Supine " horizontal abduction 2 x 15 red T band   Supine diagonals with red Tband 2 x 10 (No resistance today; caused pain)  Supine IR/ ER x 30  Standing ER with YTB as tolerated 2 x 10  Rows standing green Tband 3 x 10  Shld extension red tband 2 x 10  Serratus wall slides 1 x 10  Single arm lat pull downs 7# 3 x 10    Kleber participated in dynamic functional therapeutic activities to improve functional performance for 10 minutes, including:    Pulleys flex/abd x 2 minutes ea  Pulley eccentric lowering control on RUE x 1 min  Seated table slides at incline flex/scap x 30 ea  [place] 5# dowel 2 x 15 into flexion /scaption  and 2 x 10 scaption/abduction +2# ankle weight around wrist        Kleber received cold pack for 00 minutes to R shoulder.      Home Exercises Provided and Patient Education Provided     Education provided:   - HEP review    Written Home Exercises Provided: Patient instructed to cont prior HEP.  Exercises were reviewed and Kleber was able to demonstrate them prior to the end of the session.  Kleber demonstrated good  understanding of the education provided.     See EMR under Patient Instructions for exercises provided prior visit.    Assessment   Continued emphasis of shoulder mobility/strengthening, postural strengthening, and scapulohumeral rhythm. Frequent verbal cueing continued to required to slow pace for better motor control. Single arm lat pull downs were introduced for increased challenge to shoulder stability. Will continue to progress per pt's tolerance.    Kleber Is progressing well towards his goals.   Pt prognosis is Good.     Pt will continue to benefit from skilled outpatient physical therapy to address the deficits listed in the problem list box on initial evaluation, provide pt/family education and to maximize pt's level of independence in the home and community environment.     Pt's spiritual, cultural and educational needs considered and pt agreeable to plan of care and goals.      Anticipated barriers to physical therapy: prolonged break in care     Goals: Short Term Goals: 3 weeks   Pt to be Independent with HEP for increased strength, endurance and functional mobility.  Pt to have decreased pain 2/10 for increased functional mobility and tolerance.  Pt to increase AROM to 120 degrees R shoulder flexion and abduction for increased functional mobility.        Long Term Goals: 10 weeks   Pt to be Independent with pain management strategies and verbalize 2 for increased strength, endurance and functional mobility.  Pt to have decreased pain 0/10 for increased functional mobility and tolerance.  Pt to increase AROM to 155 degrees R shoulder flexion and abduction for increased functional mobility.  Pt to increase activity tolerance to 1 hrs for without increased pain for increased overall functional activity.  Pt to increase R shoulder flexion, abduction and ER to 4/5 for increased functional strength and activity.     Plan      Plan of care Certification: 1/7/2025 to 3/31/25.     Outpatient Physical Therapy 2 times weekly for 10 weeks to include the following interventions: Cervical/Lumbar Traction, Electrical Stimulation DN, Manual Therapy, Moist Heat/ Ice, Neuromuscular Re-ed, Patient Education, Therapeutic Activities, and Therapeutic Exercise.     Fabian Choi, PTA

## 2025-02-20 NOTE — TELEPHONE ENCOUNTER
----- Message from Rip sent at 2/20/2025 11:09 AM CST -----  Regarding: Callback Request - Urine Test  Contact: Pt [phone]  .1MEDICALADVICE Patient is calling for Medical Advice regarding: Patient would like a callback from office in regard to urine test he is having soon. He would like to be reached at number provided.How long has patient had these symptoms:Pharmacy name and phone#:Patient wants a call back or [facility]: CallComments:Please advise patient replies from provider may take up to 48 hours.

## 2025-02-21 ENCOUNTER — PATIENT MESSAGE (OUTPATIENT)
Dept: ELECTROPHYSIOLOGY | Facility: CLINIC | Age: 70
End: 2025-02-21
Payer: MEDICARE

## 2025-02-24 ENCOUNTER — PATIENT MESSAGE (OUTPATIENT)
Dept: ADMINISTRATIVE | Facility: HOSPITAL | Age: 70
End: 2025-02-24
Payer: MEDICARE

## 2025-02-25 ENCOUNTER — CLINICAL SUPPORT (OUTPATIENT)
Dept: REHABILITATION | Facility: HOSPITAL | Age: 70
End: 2025-02-25
Payer: MEDICARE

## 2025-02-25 DIAGNOSIS — M25.611 DECREASED RIGHT SHOULDER RANGE OF MOTION: Primary | ICD-10-CM

## 2025-02-25 DIAGNOSIS — R29.3 ABNORMAL POSTURE: ICD-10-CM

## 2025-02-25 DIAGNOSIS — R29.898 UPPER EXTREMITY WEAKNESS: ICD-10-CM

## 2025-02-25 DIAGNOSIS — I48.0 PAROXYSMAL ATRIAL FIBRILLATION: Primary | ICD-10-CM

## 2025-02-25 PROCEDURE — 97112 NEUROMUSCULAR REEDUCATION: CPT | Mod: HCNC,PO,CQ

## 2025-02-25 PROCEDURE — 97530 THERAPEUTIC ACTIVITIES: CPT | Mod: HCNC,PO,CQ

## 2025-02-25 NOTE — PROGRESS NOTES
"Physical Therapy Daily Treatment Note     [facility] Number: [id number]    Therapy Diagnosis:   Encounter Diagnoses   Name Primary?    Decreased right shoulder range of motion Yes    Abnormal posture     Upper extremity weakness      Physician: Nicole Kurtz, NP-C    Visit Date: 2/25/2025    Physician Orders: PT Eval and Treat   Medical Diagnosis from Referral: S49.90XD (ICD-10-CM) - Shoulder injury, unspecified laterality, subsequent encounter  Evaluation Date: 1/7/2025  Authorization Period Expiration: 12/31/25  Plan of Care Expiration: 3/31/25  Progress Note Due: 2/7/25  Date of Surgery: NA  Visit # / Visits authorized: 8/ 20  FOTO: 2/ 3       Time In: 1:57 pm   Time Out: 2:50 pm  Total Time: 53 minutes  Total Billable Time: 53 minutes     Precautions: ANA    Subjective     Pt reports: "I just came from the chiropractor and even my neck is feeling good" He continues to note improved shoulder mobility.    He was compliant with home exercise program.  Response to previous treatment: no adverse effects  Functional change: able to reach top of my head.    Pain: 0/10  Location: right shoulder      Objective     Objective measures displayed on progress notes unless otherwise noted       Treatment      Kleber received therapeutic exercises to develop strength, endurance, ROM, flexibility, posture and core stabilization for 00 minutes including:    Upper trap stretch 3 x 30 secs  Levator stretch 3 x 30 secs   Supine stretch towel roll x 3 min vertical  Supine stretch towel roll horizontal T2-T4 level x 3 min     Kleber received the following manual therapy techniques: Joint mobilizations, Manual traction, and Soft tissue Mobilization were applied to the:  for 00 minutes, including:      Kleber participated in neuromuscular re-education activities to improve: Balance, Coordination, Proprioception and Posture for 38 minutes. The following activities were included:    Scapular retraction 2 x 10 hold 3 " secs  No Money RTB 2 x 10  Cervical retraction 2 x 10 hold 3 secs  Supine HOB elevated horizontal abduction 2 x 15 red T band   Supine HOB elevated diagonals  2 x 10   Supine IR/ ER x 30  Standing ER with YTB as tolerated 2 x 10  Rows standing green Tband 3 x 10  Shld extension red tband 2 x 10  Serratus wall slides 1 x 10  Single arm lat pull downs 7# 3 x 10    Kleber participated in dynamic functional therapeutic activities to improve functional performance for 15 minutes, including:    Pulleys flex/abd x 2 minutes ea  Pulley eccentric lowering control on RUE x 1 min  Seated table slides at incline flex/scap x 30 ea  [place] 5# dowel 2 x 15 into flexion /scaption  and 2 x 10 scaption/abduction +2# ankle weight around wrist        Kleber received cold pack for 00 minutes to R shoulder.      Home Exercises Provided and Patient Education Provided     Education provided:   - HEP review    Written Home Exercises Provided: Patient instructed to cont prior HEP.  Exercises were reviewed and Kleber was able to demonstrate them prior to the end of the session.  Kleber demonstrated good  understanding of the education provided.     See EMR under Patient Instructions for exercises provided prior visit.    Assessment   Continued emphasis of shoulder mobility/strengthening, postural strengthening, and scapulohumeral rhythm. Frequent verbal cueing continued to required to slow pace for better motor control. Head of bed was elevated during horizontal abd and diagonals for increased periscapular activation. Will continue to progress per pt's tolerance.    Kleber Is progressing well towards his goals.   Pt prognosis is Good.     Pt will continue to benefit from skilled outpatient physical therapy to address the deficits listed in the problem list box on initial evaluation, provide pt/family education and to maximize pt's level of independence in the home and community environment.     Pt's spiritual, cultural and educational  needs considered and pt agreeable to plan of care and goals.     Anticipated barriers to physical therapy: prolonged break in care     Goals: Short Term Goals: 3 weeks   Pt to be Independent with HEP for increased strength, endurance and functional mobility.  Pt to have decreased pain 2/10 for increased functional mobility and tolerance.  Pt to increase AROM to 120 degrees R shoulder flexion and abduction for increased functional mobility.        Long Term Goals: 10 weeks   Pt to be Independent with pain management strategies and verbalize 2 for increased strength, endurance and functional mobility.  Pt to have decreased pain 0/10 for increased functional mobility and tolerance.  Pt to increase AROM to 155 degrees R shoulder flexion and abduction for increased functional mobility.  Pt to increase activity tolerance to 1 hrs for without increased pain for increased overall functional activity.  Pt to increase R shoulder flexion, abduction and ER to 4/5 for increased functional strength and activity.     Plan      Plan of care Certification: 1/7/2025 to 3/31/25.     Outpatient Physical Therapy 2 times weekly for 10 weeks to include the following interventions: Cervical/Lumbar Traction, Electrical Stimulation DN, Manual Therapy, Moist Heat/ Ice, Neuromuscular Re-ed, Patient Education, Therapeutic Activities, and Therapeutic Exercise.     Fabian Choi, PTA

## 2025-02-26 ENCOUNTER — PATIENT OUTREACH (OUTPATIENT)
Dept: INTERNAL MEDICINE | Facility: CLINIC | Age: 70
End: 2025-02-26
Payer: MEDICARE

## 2025-02-26 DIAGNOSIS — Z12.11 COLON CANCER SCREENING: Primary | ICD-10-CM

## 2025-02-26 DIAGNOSIS — Z01.00 DIABETIC EYE EXAM: ICD-10-CM

## 2025-02-26 DIAGNOSIS — E11.42 TYPE 2 DIABETES MELLITUS WITH DIABETIC POLYNEUROPATHY, WITHOUT LONG-TERM CURRENT USE OF INSULIN: ICD-10-CM

## 2025-02-26 DIAGNOSIS — E11.9 DIABETIC EYE EXAM: ICD-10-CM

## 2025-02-26 NOTE — PROGRESS NOTES
Chart reviewed and updated. Reconciled immunizations, health maintenance and care everywhere.  Placed referrals for Colonoscopy, diabetic eye cam and microalbumin. Responded t portal msg.      Chioma Chew, [facility]  Panel Care Coordinator  [facility]  [phone]  For Bacilio Larry MD

## 2025-02-27 ENCOUNTER — CLINICAL SUPPORT (OUTPATIENT)
Dept: REHABILITATION | Facility: HOSPITAL | Age: 70
End: 2025-02-27
Attending: PHYSICAL THERAPIST
Payer: MEDICARE

## 2025-02-27 DIAGNOSIS — R29.3 ABNORMAL POSTURE: ICD-10-CM

## 2025-02-27 DIAGNOSIS — M25.611 DECREASED RIGHT SHOULDER RANGE OF MOTION: Primary | ICD-10-CM

## 2025-02-27 DIAGNOSIS — R29.898 UPPER EXTREMITY WEAKNESS: ICD-10-CM

## 2025-02-27 PROCEDURE — 97112 NEUROMUSCULAR REEDUCATION: CPT | Mod: HCNC,PO | Performed by: PHYSICAL THERAPIST

## 2025-02-27 NOTE — PROGRESS NOTES
Physical Therapy Daily Treatment Note     [facility]  [facility] Number: [id number]    Therapy Diagnosis:   Encounter Diagnoses   Name Primary?    Decreased right shoulder range of motion Yes    Abnormal posture     Upper extremity weakness        Physician: Nicole Kurtz, NP-C    Visit Date: 2/27/2025    Physician Orders: PT Eval and Treat   Medical Diagnosis from Referral: S49.90XD (ICD-10-CM) - Shoulder injury, unspecified laterality, subsequent encounter  Evaluation Date: 1/7/2025  Authorization Period Expiration: 12/31/25  Plan of Care Expiration: 3/31/25  Progress Note Due: 2/7/25  Date of Surgery: NA  Visit # / Visits authorized: 9/ 20  FOTO: 2/ 3       Time In: 1515 pm   Time Out: 1600 pm  Total Time: 55 minutes  Total Billable Time: 30 minutes  (1:1)    Precautions: [place]    Subjective     Pt reports: His shoulder hurts a little today. + Pain with diagonals and t/s stretch in right shoulder.    He was compliant with home exercise program.  Response to previous treatment: no adverse effects  Functional change: able to reach top of my head.    Pain: 0/10  Location: right shoulder      Objective     Objective measures displayed on progress notes unless otherwise noted       Treatment      Klbeer received therapeutic exercises to develop strength, endurance, ROM, flexibility, posture and core stabilization for 15 minutes including:    Upper trap stretch 3 x 30 secs  Levator stretch 3 x 30 secs   Supine stretch towel roll x 3 min vertical  Supine stretch towel roll horizontal T2-T4 level x 3 min     (Patient instructed to lift arms as needed in stretches.)    Kleber received the following manual therapy techniques: Joint mobilizations, Manual traction, and Soft tissue Mobilization were applied to the:  for 00 minutes, including:      Kleber participated in neuromuscular re-education activities to improve: Balance, Coordination, Proprioception and Posture for 28 minutes. The following activities were  included:    Scapular retraction 2 x 10 hold 3 secs  No Money RTB 2 x 10  Cervical retraction 2 x 10 hold 3 secs  Supine HOB elevated horizontal abduction 2 x 15 red T band   Supine HOB elevated diagonals  2 x 10   Supine IR/ ER x 30  Standing ER with YTB as tolerated 2 x 10-np  Rows standing green Tband 3 x 10-np  Shld extension red tband 2 x 10-np  Serratus wall slides 1 x 10-np  Single arm lat pull downs 7# 3 x 10-np    Kleber participated in dynamic functional therapeutic activities to improve functional performance for 2 minutes, including:    Pulleys flex/abd x 2 minutes ea    Np:  Pulley eccentric lowering control on RUE x 1 min  Seated table slides at incline flex/scap x 30 ea  [place] 5# dowel 2 x 15 into flexion /scaption  and 2 x 10 scaption/abduction +2# ankle weight around wrist        Kleber received cold pack for 00 minutes to R shoulder.      Home Exercises Provided and Patient Education Provided     Education provided:   - HEP review    Written Home Exercises Provided: Patient instructed to cont prior HEP.  Exercises were reviewed and Kleber was able to demonstrate them prior to the end of the session.  Kleber demonstrated good  understanding of the education provided.     See EMR under Patient Instructions for exercises provided prior visit.    Assessment   Continued emphasis of shoulder mobility/strengthening, postural strengthening, and scapulohumeral rhythm. Frequent verbal cueing continued to required to slow pace for better motor control. Head of bed was elevated during horizontal abd and diagonals for increased periscapular activation.Patient demos lack of dynamic shoulder control with diagonals. He is better able to perform these in supine gravity assisted. Will continue to progress per pt's tolerance as he continues to report improving condition.    Kleber Is progressing well towards his goals.   Pt prognosis is Good.     Pt will continue to benefit from skilled outpatient physical  therapy to address the deficits listed in the problem list box on initial evaluation, provide pt/family education and to maximize pt's level of independence in the home and community environment.     Pt's spiritual, cultural and educational needs considered and pt agreeable to plan of care and goals.     Anticipated barriers to physical therapy: prolonged break in care     Goals: Short Term Goals: 3 weeks   Pt to be Independent with HEP for increased strength, endurance and functional mobility.  Pt to have decreased pain 2/10 for increased functional mobility and tolerance.  Pt to increase AROM to 120 degrees R shoulder flexion and abduction for increased functional mobility.        Long Term Goals: 10 weeks   Pt to be Independent with pain management strategies and verbalize 2 for increased strength, endurance and functional mobility.  Pt to have decreased pain 0/10 for increased functional mobility and tolerance.  Pt to increase AROM to 155 degrees R shoulder flexion and abduction for increased functional mobility.  Pt to increase activity tolerance to 1 hrs for without increased pain for increased overall functional activity.  Pt to increase R shoulder flexion, abduction and ER to 4/5 for increased functional strength and activity.     Plan      Plan of care Certification: 1/7/2025 to 3/31/25.     Outpatient Physical Therapy 2 times weekly for 10 weeks to include the following interventions: Cervical/Lumbar Traction, Electrical Stimulation DN, Manual Therapy, Moist Heat/ Ice, Neuromuscular Re-ed, Patient Education, Therapeutic Activities, and Therapeutic Exercise.     Mary Torre, PT, DPT

## 2025-03-05 ENCOUNTER — TELEPHONE (OUTPATIENT)
Dept: INTERNAL MEDICINE | Facility: CLINIC | Age: 70
End: 2025-03-05
Payer: MEDICARE

## 2025-03-05 NOTE — TELEPHONE ENCOUNTER
Kleber is calling to speak to the provider or staff to know if the letter to the [employer]V was sent over or not. Pt states he did his urine sample. Please call Kleber back for advice     Called pt to inquire about message details, no answer, lvm

## 2025-03-06 ENCOUNTER — TELEPHONE (OUTPATIENT)
Dept: INTERNAL MEDICINE | Facility: CLINIC | Age: 70
End: 2025-03-06
Payer: MEDICARE

## 2025-03-06 DIAGNOSIS — R44.3 HALLUCINATIONS, UNSPECIFIED: ICD-10-CM

## 2025-03-06 DIAGNOSIS — Z71.51 DRUG ABUSE COUNSELING AND SURVEILLANCE OF DRUG ABUSER: ICD-10-CM

## 2025-03-06 DIAGNOSIS — F10.10 ALCOHOL ABUSE: Primary | ICD-10-CM

## 2025-03-06 DIAGNOSIS — F10.14 ALCOHOL ABUSE WITH ALCOHOL-INDUCED MOOD DISORDER: ICD-10-CM

## 2025-03-06 NOTE — TELEPHONE ENCOUNTER
----- Message from Rashmi sent at 3/6/2025 10:58 AM CST -----  Contact: [phone]  [email or web address] Patient is calling for Medical Advice regarding: Patient states that he took a lab and urine test and it was to be sent to [employer] and patient would like to know if this was done? Please call to confirm. Comments: Please call patient

## 2025-03-06 NOTE — TELEPHONE ENCOUNTER
Called pt regarding urine drug screen to restore his driving rights. 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  Daughter would like to know if he should  come in to us for assessment or back to Neuropsych for new/continued assessment to restore his driving rights? Neur said not able to respond appropriately enough to drive at the time he was assessed, please advise

## 2025-03-10 ENCOUNTER — TELEPHONE (OUTPATIENT)
Dept: INTERNAL MEDICINE | Facility: CLINIC | Age: 70
End: 2025-03-10
Payer: MEDICARE

## 2025-03-10 NOTE — TELEPHONE ENCOUNTER
Called pt and pt daughter and let them know that per Dr Larry, he would recommend that pt sees Neurology to determine if he is able to drive.

## 2025-03-12 ENCOUNTER — CLINICAL SUPPORT (OUTPATIENT)
Dept: REHABILITATION | Facility: HOSPITAL | Age: 70
End: 2025-03-12
Attending: PHYSICAL THERAPIST
Payer: MEDICARE

## 2025-03-12 DIAGNOSIS — R29.3 ABNORMAL POSTURE: ICD-10-CM

## 2025-03-12 DIAGNOSIS — M25.60 STIFFNESS IN JOINT: Primary | ICD-10-CM

## 2025-03-12 DIAGNOSIS — R53.1 WEAKNESS: ICD-10-CM

## 2025-03-12 DIAGNOSIS — M25.511 ACUTE PAIN OF RIGHT SHOULDER: ICD-10-CM

## 2025-03-12 DIAGNOSIS — R29.898 UPPER EXTREMITY WEAKNESS: ICD-10-CM

## 2025-03-12 DIAGNOSIS — M25.611 DECREASED RIGHT SHOULDER RANGE OF MOTION: ICD-10-CM

## 2025-03-12 PROCEDURE — 97112 NEUROMUSCULAR REEDUCATION: CPT | Mod: HCNC,PO

## 2025-03-12 PROCEDURE — 97530 THERAPEUTIC ACTIVITIES: CPT | Mod: HCNC,PO

## 2025-03-15 NOTE — PROGRESS NOTES
Physical Therapy Daily Treatment Note     [facility]  [facility] Number: [id number]    Therapy Diagnosis:   Encounter Diagnoses   Name Primary?    Stiffness in joint Yes    Weakness     Decreased right shoulder range of motion     Abnormal posture     Upper extremity weakness     Acute pain of right shoulder        Physician: Nicole Kurtz, NP-C    Visit Date: 3/12/2025    Physician Orders: PT Eval and Treat   Medical Diagnosis from Referral: S49.90XD (ICD-10-CM) - Shoulder injury, unspecified laterality, subsequent encounter  Evaluation Date: 1/7/2025  Authorization Period Expiration: 12/31/25  Plan of Care Expiration: 3/31/25 extend POC 5/31/25  Progress Note Due: 4/13/25  Date of Surgery: NA  Visit # / Visits authorized: 11/ 20  FOTO: 2/ 3       Time In: 1501 pm   Time Out: 1555 pm  Total Time: 54 minutes  Total Billable Time: 28 minutes  (1:1)    Precautions: [place]    Subjective     Pt reports: His shoulder hurts but is better overall    He was compliant with home exercise program.  Response to previous treatment: no adverse effects  Functional change: able to reach top of my head.    Pain: 2/10  Location: right shoulder      Objective     3/13/25  R shoulder  Standing AROM flexion : 90  ;  abduction 70 ; ER 40  AAROM flexion : 150;  abduction 135;  ER 50       Treatment      Kleber received therapeutic exercises to develop strength, endurance, ROM, flexibility, posture and core stabilization for 6 minutes including:    Upper trap stretch 3 x 30 secs NP  Levator stretch 3 x 30 secs NP  Supine stretch towel roll x 3 min vertical  Supine stretch towel roll horizontal T2-T4 level x 3 min     (Patient instructed to lift arms as needed in stretches.)    Kleber received the following manual therapy techniques: Joint mobilizations, Manual traction, and Soft tissue Mobilization were applied to the:  for 00 minutes, including:      Kleber participated in neuromuscular re-education activities to improve: Balance,  Coordination, Proprioception and Posture for 38 minutes. The following activities were included:    Scapular retraction 2 x 10 hold 3 secs  No Money RTB 2 x 10  Cervical retraction 2 x 10 hold 3 secs  Supine HOB elevated horizontal abduction 2 x 15 green T band   Supine HOB elevated diagonals  2 x 10 green Tband  Supine IR/ ER x 30 2 # weight small amplitude  Standing ER with YTB as tolerated 2 x 10-  Rows standing green Tband 3 x 10-  Shld extension red tband 2 x 10-np  Serratus wall slides 1 x 10-np  Single arm lat pull downs 7# 3 x 10-np    Kleber participated in dynamic functional therapeutic activities to improve functional performance for 10 minutes, including:    Reassessment of shoulder 3/13/25  Pulleys flex/abd x 2 minutes ea  [place] 5# dowel 2 x 15 into flexion /scaption  and 2 x 10 scaption/abduction +2# ankle weight around wrist    Np:  Pulley eccentric lowering control on RUE x 1 min  Seated table slides at incline flex/scap x 30 ea          Kleber received cold pack for 00 minutes to R shoulder.      Home Exercises Provided and Patient Education Provided     Education provided:   - HEP review    Written Home Exercises Provided: Patient instructed to cont prior HEP.  Exercises were reviewed and Kleber was able to demonstrate them prior to the end of the session.  Kleber demonstrated good  understanding of the education provided.     See EMR under Patient Instructions for exercises provided prior visit.    Assessment   Pt limited by pain with AROM but able to perform supine exercise with HOB elevated and no complaints of pain just fatigue. Pt had good exercise with overhead motion such as landmines without issues just fatigue with session. Pt with limited carryover likely due to compliance with HEP as he performed well in session.  Pt has functional AAROM overall and 3 of 8 set goals achieved. PT to extend POC to cover approved visits and improve functional outcome    Kleber Is progressing well  towards his goals.   Pt prognosis is Good.     Pt will continue to benefit from skilled outpatient physical therapy to address the deficits listed in the problem list box on initial evaluation, provide pt/family education and to maximize pt's level of independence in the home and community environment.     Pt's spiritual, cultural and educational needs considered and pt agreeable to plan of care and goals.     Anticipated barriers to physical therapy: prolonged break in care     Goals: Short Term Goals: 3 weeks   Pt to be Independent with HEP for increased strength, endurance and functional mobility. MET  Pt to have decreased pain 2/10 for increased functional mobility and tolerance. MET  Pt to increase AROM to 120 degrees R shoulder flexion and abduction for increased functional mobility. Progressing        Long Term Goals: 10 weeks   Pt to be Independent with pain management strategies and verbalize 2 for increased strength, endurance and functional mobility.  Pt to have decreased pain 0/10 for increased functional mobility and tolerance. MET  Pt to increase AROM to 155 degrees R shoulder flexion and abduction for increased functional mobility. progressing  Pt to increase activity tolerance to 1 hrs for without increased pain for increased overall functional activity.  Pt to increase R shoulder flexion, abduction and ER to 4/5 for increased functional strength and activity.     Plan      Plan of care Certification: 1/7/2025 to 3/31/25 extend POC to 5/31/25     Outpatient Physical Therapy 2 times weekly for 10 weeks to include the following interventions: Cervical/Lumbar Traction, Electrical Stimulation DN, Manual Therapy, Moist Heat/ Ice, Neuromuscular Re-ed, Patient Education, Therapeutic Activities, and Therapeutic Exercise.     Roxy Cortez, PT

## 2025-03-20 ENCOUNTER — CLINICAL SUPPORT (OUTPATIENT)
Dept: REHABILITATION | Facility: HOSPITAL | Age: 70
End: 2025-03-20
Payer: MEDICARE

## 2025-03-20 DIAGNOSIS — R29.898 UPPER EXTREMITY WEAKNESS: ICD-10-CM

## 2025-03-20 DIAGNOSIS — M25.611 DECREASED RIGHT SHOULDER RANGE OF MOTION: Primary | ICD-10-CM

## 2025-03-20 DIAGNOSIS — R29.3 ABNORMAL POSTURE: ICD-10-CM

## 2025-03-20 PROCEDURE — 97530 THERAPEUTIC ACTIVITIES: CPT | Mod: HCNC,PO

## 2025-03-20 PROCEDURE — 97112 NEUROMUSCULAR REEDUCATION: CPT | Mod: HCNC,PO

## 2025-03-22 NOTE — PROGRESS NOTES
Physical Therapy Daily Treatment Note     [facility]  [facility] Number: [id number]    Therapy Diagnosis:   Encounter Diagnoses   Name Primary?    Decreased right shoulder range of motion Yes    Abnormal posture     Upper extremity weakness        Physician: Nicole Kurtz, NP-C    Visit Date: 3/20/2025    Physician Orders: PT Eval and Treat   Medical Diagnosis from Referral: S49.90XD (ICD-10-CM) - Shoulder injury, unspecified laterality, subsequent encounter  Evaluation Date: 1/7/2025  Authorization Period Expiration: 12/31/25  Plan of Care Expiration: 3/31/25 extend POC 5/31/25  Progress Note Due: 4/13/25  Date of Surgery: NA  Visit # / Visits authorized: 11/ 20  FOTO: 2/ 3       Time In: 1454 pm   Time Out: 1545 pm  Total Time: 51 minutes  Total Billable Time: 24 minutes  (1:1) TA 1 NMR 1    Precautions: [place]    Subjective     Pt reports: His shoulder hurts but is better overall    He was compliant with home exercise program.  Response to previous treatment: no adverse effects  Functional change: able to reach top of my head.    Pain: 2/10  Location: right shoulder      Objective     3/13/25  R shoulder  Standing AROM flexion : 90  ;  abduction 70 ; ER 40  AAROM flexion : 150;  abduction 135;  ER 50       Treatment      Kleber received therapeutic exercises to develop strength, endurance, ROM, flexibility, posture and core stabilization for 6 minutes including:    Upper trap stretch 3 x 30 secs NP  Levator stretch 3 x 30 secs NP  Supine stretch towel roll x 3 min vertical  Supine stretch towel roll horizontal T2-T4 level x 3 min     (Patient instructed to lift arms as needed in stretches.)    Kleber received the following manual therapy techniques: Joint mobilizations, Manual traction, and Soft tissue Mobilization were applied to the:  for 00 minutes, including:      Kleber participated in neuromuscular re-education activities to improve: Balance, Coordination, Proprioception and Posture for 35 minutes.  The following activities were include    Scapular retraction 2 x 10 hold 3 secs  No Money RTB 2 x 10 NP  Cervical retraction 2 x 10 hold 3 secs NP  Supine HOB elevated horizontal abduction 2 x 15 green T band   Supine HOB elevated diagonals  2 x 10 green Tband  Supine IR/ ER x 30 2 # weight small amplitude  Standing ER with YTB as tolerated 2 x 10-  Rows standing green Tband 3 x 10-  Shld extension red tband 2 x 10-np  Serratus wall slides 1 x 10-np  Single arm lat pull downs 7# 3 x 10-np    Kleber participated in dynamic functional therapeutic activities to improve functional performance for 10 minutes, including:  Pulleys flex/abd x 2 minutes ea  [place] 5# dowel 3 x 15 into flexion /scaption  and 3x 10 scaption/abduction +2# ankle weight around wrist    Np:  Pulley eccentric lowering control on RUE x 1 min  Seated table slides at incline flex/scap x 30 ea          Kleber received cold pack for 00 minutes to R shoulder.      Home Exercises Provided and Patient Education Provided     Education provided:   - HEP review    Written Home Exercises Provided: Patient instructed to cont prior HEP.  Exercises were reviewed and Kleber was able to demonstrate them prior to the end of the session.  Kleber demonstrated good  understanding of the education provided.     See EMR under Patient Instructions for exercises provided prior visit.    Assessment   Pt limited by pain with AROM but able to perform increased AROM and AAROM of motion WFL in session as session progressed with no increased pain . Pt with most of his session strengthening overhead motion with good tolerance in session with minimal rest breaks overall.     Kleber Is progressing well towards his goals.   Pt prognosis is Good.     Pt will continue to benefit from skilled outpatient physical therapy to address the deficits listed in the problem list box on initial evaluation, provide pt/family education and to maximize pt's level of independence in the home and  community environment.     Pt's spiritual, cultural and educational needs considered and pt agreeable to plan of care and goals.     Anticipated barriers to physical therapy: prolonged break in care     Goals: Short Term Goals: 3 weeks   Pt to be Independent with HEP for increased strength, endurance and functional mobility. MET  Pt to have decreased pain 2/10 for increased functional mobility and tolerance. MET  Pt to increase AROM to 120 degrees R shoulder flexion and abduction for increased functional mobility. Progressing        Long Term Goals: 10 weeks   Pt to be Independent with pain management strategies and verbalize 2 for increased strength, endurance and functional mobility.  Pt to have decreased pain 0/10 for increased functional mobility and tolerance. MET  Pt to increase AROM to 155 degrees R shoulder flexion and abduction for increased functional mobility. progressing  Pt to increase activity tolerance to 1 hrs for without increased pain for increased overall functional activity.  Pt to increase R shoulder flexion, abduction and ER to 4/5 for increased functional strength and activity.     Plan      Plan of care Certification: 1/7/2025 to 3/31/25 extend POC to 5/31/25     Outpatient Physical Therapy 2 times weekly for 10 weeks to include the following interventions: Cervical/Lumbar Traction, Electrical Stimulation DN, Manual Therapy, Moist Heat/ Ice, Neuromuscular Re-ed, Patient Education, Therapeutic Activities, and Therapeutic Exercise.     Roxy Cortez, PT

## 2025-03-25 ENCOUNTER — CLINICAL SUPPORT (OUTPATIENT)
Dept: REHABILITATION | Facility: HOSPITAL | Age: 70
End: 2025-03-25
Payer: MEDICARE

## 2025-03-25 DIAGNOSIS — R29.898 UPPER EXTREMITY WEAKNESS: ICD-10-CM

## 2025-03-25 DIAGNOSIS — M25.611 DECREASED RIGHT SHOULDER RANGE OF MOTION: Primary | ICD-10-CM

## 2025-03-25 DIAGNOSIS — R29.3 ABNORMAL POSTURE: ICD-10-CM

## 2025-03-25 PROCEDURE — 97530 THERAPEUTIC ACTIVITIES: CPT | Mod: HCNC,PO

## 2025-03-25 PROCEDURE — 97112 NEUROMUSCULAR REEDUCATION: CPT | Mod: HCNC,PO

## 2025-03-27 ENCOUNTER — CLINICAL SUPPORT (OUTPATIENT)
Dept: REHABILITATION | Facility: HOSPITAL | Age: 70
End: 2025-03-27
Payer: MEDICARE

## 2025-03-27 DIAGNOSIS — M25.611 DECREASED RIGHT SHOULDER RANGE OF MOTION: Primary | ICD-10-CM

## 2025-03-27 DIAGNOSIS — R29.3 ABNORMAL POSTURE: ICD-10-CM

## 2025-03-27 DIAGNOSIS — R29.898 UPPER EXTREMITY WEAKNESS: ICD-10-CM

## 2025-03-27 PROCEDURE — 97530 THERAPEUTIC ACTIVITIES: CPT | Mod: HCNC,PO

## 2025-03-27 PROCEDURE — 97112 NEUROMUSCULAR REEDUCATION: CPT | Mod: HCNC,PO

## 2025-03-27 NOTE — PROGRESS NOTES
Physical Therapy Daily Treatment Note     [facility]  [facility] Number: [id number]    Therapy Diagnosis:   Encounter Diagnoses   Name Primary?    Decreased right shoulder range of motion Yes    Abnormal posture     Upper extremity weakness        Physician: Nicole Kurtz, NP-C    Visit Date: 3/25/2025    Physician Orders: PT Eval and Treat   Medical Diagnosis from Referral: S49.90XD (ICD-10-CM) - Shoulder injury, unspecified laterality, subsequent encounter  Evaluation Date: 1/7/2025  Authorization Period Expiration: 12/31/25  Plan of Care Expiration: 3/31/25 extend POC 5/31/25  Progress Note Due: 4/13/25  Date of Surgery: NA  Visit # / Visits authorized: 11/ 20  FOTO: 2/ 3       Time In: 1404 pm   Time Out: 1455 pm  Total Time: 51 minutes  Total Billable Time: 24 minutes  (1:1) TA 1 NMR 1    Precautions: [place]    Subjective     Pt reports: His shoulder hurts but is better overall    He was compliant with home exercise program.  Response to previous treatment: no adverse effects  Functional change: able to reach top of my head.    Pain: 2/10  Location: right shoulder      Objective     3/13/25  R shoulder  Standing AROM flexion : 90  ;  abduction 70 ; ER 40  AAROM flexion : 150;  abduction 135;  ER 50       Treatment      Kleber received therapeutic exercises to develop strength, endurance, ROM, flexibility, posture and core stabilization for 11 minutes including:    Upper trap stretch 3 x 30 secs   Levator stretch 3 x 30 secs   Supine stretch towel roll x 3 min vertical  Supine stretch towel roll horizontal T2-T4 level x 3 min     (Patient instructed to lift arms as needed in stretches.)    Kleber received the following manual therapy techniques: Joint mobilizations, Manual traction, and Soft tissue Mobilization were applied to the:  for 00 minutes, including:      Kleber participated in neuromuscular re-education activities to improve: Balance, Coordination, Proprioception and Posture for 30 minutes. The  following activities were include    Scapular retraction 2 x 10 hold 3 secs NP  No Money RTB 2 x 10 NP  Cervical retraction 2 x 10 hold 3 secs NP  Supine HOB elevated horizontal abduction 2 x 15 green T band   Supine HOB elevated diagonals  2 x 10 green Tband  Supine IR/ ER x 30 2 # weight small amplitude  Standing ER with YTB as tolerated 2 x 10-  Rows standing green Tband 3 x 10-  Shld extension red tband 2 x 10-np  Serratus wall slides 1 x 10-np  Single arm lat pull downs 7# 3 x 10-np    Kleber participated in dynamic functional therapeutic activities to improve functional performance for 10 minutes, including:  Pulleys flex/abd x 2 minutes ea  [place] 5# dowel 3 x 15 into flexion /scaption  and 3x 10 scaption/abduction +2# ankle weight around wrist    Np:  Pulley eccentric lowering control on RUE x 1 min  Seated table slides at incline flex/scap x 30 ea          Kleber received cold pack for 00 minutes to R shoulder.      Home Exercises Provided and Patient Education Provided     Education provided:   - HEP review    Written Home Exercises Provided: Patient instructed to cont prior HEP.  Exercises were reviewed and Kleber was able to demonstrate them prior to the end of the session.  Kleber demonstrated good  understanding of the education provided.     See EMR under Patient Instructions for exercises provided prior visit.    Assessment   Pt with minimal pain today and arrived with AROM flexion 120 without complaints or facial grimace with AROM today. Pt continues to progress overall with strengthening overhead for 75% of the session with good tolerance and increased workload overall adding one more set of 10 more reps per set as tolerated.     Kleber Is progressing well towards his goals.   Pt prognosis is Good.     Pt will continue to benefit from skilled outpatient physical therapy to address the deficits listed in the problem list box on initial evaluation, provide pt/family education and to maximize pt's  level of independence in the home and community environment.     Pt's spiritual, cultural and educational needs considered and pt agreeable to plan of care and goals.     Anticipated barriers to physical therapy: prolonged break in care     Goals: Short Term Goals: 3 weeks   Pt to be Independent with HEP for increased strength, endurance and functional mobility. MET  Pt to have decreased pain 2/10 for increased functional mobility and tolerance. MET  Pt to increase AROM to 120 degrees R shoulder flexion and abduction for increased functional mobility. MET        Long Term Goals: 10 weeks   Pt to be Independent with pain management strategies and verbalize 2 for increased strength, endurance and functional mobility.  Pt to have decreased pain 0/10 for increased functional mobility and tolerance. MET  Pt to increase AROM to 155 degrees R shoulder flexion and abduction for increased functional mobility. progressing  Pt to increase activity tolerance to 1 hrs for without increased pain for increased overall functional activity.  Pt to increase R shoulder flexion, abduction and ER to 4/5 for increased functional strength and activity.     Plan      Plan of care Certification: 1/7/2025 to 3/31/25 extend POC to 5/31/25     Outpatient Physical Therapy 2 times weekly for 10 weeks to include the following interventions: Cervical/Lumbar Traction, Electrical Stimulation DN, Manual Therapy, Moist Heat/ Ice, Neuromuscular Re-ed, Patient Education, Therapeutic Activities, and Therapeutic Exercise.     Roxy Cortez, PT

## 2025-03-29 NOTE — PROGRESS NOTES
Relationship: Self    Best call back number: [phone]    What medication are you requesting: ACYCLOVIR    What are your current symptoms: 2 FEVER BLISTERS ON THE OUTSIDE AND 1 ON THE INSIDE    How long have you been experiencing symptoms: 4 DAYS    Have you had these symptoms before:    [x] Yes  [] No    Have you been treated for these symptoms before:   [x] Yes  [] No    If a prescription is needed, what is your preferred pharmacy and phone number: [address] - [phone] [facility] [phone]      Additional notes:           Physical Therapy Daily Treatment Note     [facility]  [facility] Number: [id number]    Therapy Diagnosis:   Encounter Diagnoses   Name Primary?    Decreased right shoulder range of motion Yes    Abnormal posture     Upper extremity weakness        Physician: Nicole Kurtz, NP-C    Visit Date: 3/27/2025    Physician Orders: PT Eval and Treat   Medical Diagnosis from Referral: S49.90XD (ICD-10-CM) - Shoulder injury, unspecified laterality, subsequent encounter  Evaluation Date: 1/7/2025  Authorization Period Expiration: 12/31/25  Plan of Care Expiration: 3/31/25 extend POC 5/31/25  Progress Note Due: 4/13/25  Date of Surgery: NA  Visit # / Visits authorized: 11/ 20  FOTO: 2/ 3       Time In: 1555 pm   Time Out: 1445 pm  Total Time: 50 minutes  Total Billable Time: 24 minutes  (1:1) TA 1 NMR 1    Precautions: [place]    Subjective     Pt reports: able to lift overhead now and no pain    He was compliant with home exercise program.  Response to previous treatment: no adverse effects  Functional change: able to reach top of my head.    Pain: 0/10  Location: right shoulder      Objective     3/13/25  R shoulder  Standing AROM flexion : 90  ;  abduction 70 ; ER 40  AAROM flexion : 150;  abduction 135;  ER 50       Treatment      Kleber received therapeutic exercises to develop strength, endurance, ROM, flexibility, posture and core stabilization for 10 minutes including:    Upper trap stretch 3 x 30 secs   Levator stretch 3 x 30 secs   Supine stretch towel roll x 3 min vertical  Supine stretch towel roll horizontal T2-T4 level x 3 min     (Patient instructed to lift arms as needed in stretches.)    Kleber received the following manual therapy techniques: Joint mobilizations, Manual traction, and Soft tissue Mobilization were applied to the:  for 00 minutes, including:      Kleber participated in neuromuscular re-education activities to improve: Balance, Coordination, Proprioception and Posture for 30 minutes. The  following activities were include    Scapular retraction 2 x 10 hold 3 secs NP  No Money RTB 2 x 10 NP  Cervical retraction 2 x 10 hold 3 secs NP  Supine HOB elevated horizontal abduction 2 x 15 green T band   Supine HOB elevated diagonals  2 x 10 green Tband  Supine IR/ ER x 30 2 # weight small amplitude  Standing ER with YTB as tolerated 2 x 10-  Rows standing green Tband 3 x 10-  Shld extension red tband 2 x 10-np  Serratus wall slides 1 x 10-np  Single arm lat pull downs 7# 3 x 10-np    Kleber participated in dynamic functional therapeutic activities to improve functional performance for 10 minutes, including:  Pulleys flex/abd x 2 minutes ea  [place] 5# dowel 3 x 15 into flexion /scaption  and 3x 10 scaption/abduction +2# ankle weight around wrist    Np:  Pulley eccentric lowering control on RUE x 1 min  Seated table slides at incline flex/scap x 30 ea          Kleber received cold pack for 00 minutes to R shoulder.      Home Exercises Provided and Patient Education Provided     Education provided:   - HEP review    Written Home Exercises Provided: Patient instructed to cont prior HEP.  Exercises were reviewed and Kleber was able to demonstrate them prior to the end of the session.  Kleber demonstrated good  understanding of the education provided.     See EMR under Patient Instructions for exercises provided prior visit.    Assessment   Pt with minimal pain today and arrived with AROM flexion 160 without complaints or facial grimace with AROM today. Pt continues to progress overall with strengthening overhead for 75% of the session with good tolerance and no increased pain for continued progression.    Kleber Is progressing well towards his goals.   Pt prognosis is Good.     Pt will continue to benefit from skilled outpatient physical therapy to address the deficits listed in the problem list box on initial evaluation, provide pt/family education and to maximize pt's level of independence in the home and  community environment.     Pt's spiritual, cultural and educational needs considered and pt agreeable to plan of care and goals.     Anticipated barriers to physical therapy: prolonged break in care     Goals: Short Term Goals: 3 weeks   Pt to be Independent with HEP for increased strength, endurance and functional mobility. MET  Pt to have decreased pain 2/10 for increased functional mobility and tolerance. MET  Pt to increase AROM to 120 degrees R shoulder flexion and abduction for increased functional mobility. MET        Long Term Goals: 10 weeks   Pt to be Independent with pain management strategies and verbalize 2 for increased strength, endurance and functional mobility.  Pt to have decreased pain 0/10 for increased functional mobility and tolerance. MET  Pt to increase AROM to 155 degrees R shoulder flexion and abduction for increased functional mobility. progressing  Pt to increase activity tolerance to 1 hrs for without increased pain for increased overall functional activity.  Pt to increase R shoulder flexion, abduction and ER to 4/5 for increased functional strength and activity.     Plan      Plan of care Certification: 1/7/2025 to 3/31/25 extend POC to 5/31/25     Outpatient Physical Therapy 2 times weekly for 10 weeks to include the following interventions: Cervical/Lumbar Traction, Electrical Stimulation DN, Manual Therapy, Moist Heat/ Ice, Neuromuscular Re-ed, Patient Education, Therapeutic Activities, and Therapeutic Exercise.     Roxy Cortez, PT

## 2025-04-01 ENCOUNTER — CLINICAL SUPPORT (OUTPATIENT)
Dept: REHABILITATION | Facility: HOSPITAL | Age: 70
End: 2025-04-01
Payer: MEDICARE

## 2025-04-01 ENCOUNTER — PATIENT MESSAGE (OUTPATIENT)
Dept: ELECTROPHYSIOLOGY | Facility: CLINIC | Age: 70
End: 2025-04-01
Payer: MEDICARE

## 2025-04-01 DIAGNOSIS — R29.898 UPPER EXTREMITY WEAKNESS: ICD-10-CM

## 2025-04-01 DIAGNOSIS — R29.3 ABNORMAL POSTURE: ICD-10-CM

## 2025-04-01 DIAGNOSIS — M25.611 DECREASED RIGHT SHOULDER RANGE OF MOTION: Primary | ICD-10-CM

## 2025-04-01 PROCEDURE — 97530 THERAPEUTIC ACTIVITIES: CPT | Mod: HCNC,PO,CQ

## 2025-04-01 PROCEDURE — 97112 NEUROMUSCULAR REEDUCATION: CPT | Mod: HCNC,PO,CQ

## 2025-04-01 PROCEDURE — 97110 THERAPEUTIC EXERCISES: CPT | Mod: HCNC,PO,CQ

## 2025-04-01 NOTE — PROGRESS NOTES
Physical Therapy Daily Treatment Note     Name: Kleber Schuster [facility] Number: [id number]    Therapy Diagnosis:   Encounter Diagnoses   Name Primary?    Decreased right shoulder range of motion Yes    Abnormal posture     Upper extremity weakness        Physician: Nicole Kurtz, NP-C    Visit Date: 4/1/2025    Physician Orders: PT Eval and Treat   Medical Diagnosis from Referral: S49.90XD (ICD-10-CM) - Shoulder injury, unspecified laterality, subsequent encounter  Evaluation Date: 1/7/2025  Authorization Period Expiration: 12/31/25  Plan of Care Expiration: 3/31/25 extend POC 5/31/25  Progress Note Due: 4/13/25  Date of Surgery: NA  Visit # / Visits authorized: 15/ 20  FOTO: 2/ 3       Time In: 2:00 pm   Time Out: 2:53 pm  Total Time: 53 minutes  Total Billable Time: 53 minutes      Precautions: [place]    Subjective     Pt reports: continued improved R shoulder mobility and notes he has increased frequency of completion of home exercises recently    He was compliant with home exercise program.  Response to previous treatment: no adverse effects  Functional change: able to reach top of my head.    Pain: 0/10  Location: right shoulder      Objective     3/13/25  R shoulder  Standing AROM flexion : 90  ;  abduction 70 ; ER 40  AAROM flexion : 150;  abduction 135;  ER 50       Treatment      Kleber received therapeutic exercises to develop strength, endurance, ROM, flexibility, posture and core stabilization for 10 minutes including:    Upper trap stretch 3 x 30 secs   Levator stretch 3 x 30 secs   Supine stretch towel roll x 3 min vertical  Supine stretch towel roll horizontal T2-T4 level x 3 min     (Patient instructed to lift arms as needed in stretches.)    Kleber received the following manual therapy techniques: Joint mobilizations, Manual traction, and Soft tissue Mobilization were applied to the:  for 00 minutes, including:      Kleber participated in neuromuscular re-education activities to improve: Balance,  Coordination, Proprioception and Posture for 30 minutes. The following activities were include    Scapular retraction 2 x 10 hold 3 secs NP  No Money RTB 2 x 10 NP  Cervical retraction 2 x 10 hold 3 secs NP  Standing horizontal abduction 2 x 15 green T band   Supine HOB  elevated diagonals  2 x 10 green Tband  Supine IR/ ER x 30 2 # weight small amplitude-NP  Standing ER with YTB as tolerated 2 x 10-  Rows standing green Tband 3 x 10-  Shld extension red tband 2 x 10-np  Serratus wall slides 1 x 10-np  Single arm lat pull downs 7# 3 x 10-np    Kleber participated in dynamic functional therapeutic activities to improve functional performance for 13 minutes, including:  Pulleys flex/abd x 2 minutes ea  [place] 5# dowel 3 x 15 into flexion /scaption  and 3x 10 scaption/abduction +2# ankle weight around wrist  Cabinet reach (no resistance) 2 x 10    Np:  Pulley eccentric lowering control on RUE x 1 min  Seated table slides at incline flex/scap x 30 ea          Kleber received cold pack for 00 minutes to R shoulder.      Home Exercises Provided and Patient Education Provided     Education provided:   - HEP review    Written Home Exercises Provided: Patient instructed to cont prior HEP.  Exercises were reviewed and Kleber was able to demonstrate them prior to the end of the session.  Kleber demonstrated good  understanding of the education provided.     See EMR under Patient Instructions for exercises provided prior visit.    Assessment   Kleber returned without complaints of R shoulder pain and continues to note improved functional R shoulder mobility. Increased emphasis on functional shoulder strengthening by performing more exercises in standing and by introducing cabinet reaches to first shelf of cabinet. Cueing still required to slow pace and to reduce UT compensations. Overall good tolerance with no adverse effects. Will continue to progress per pt's tolerance.    Pt with minimal pain today and arrived with AROM  flexion 160 without complaints or facial grimace with AROM today. Pt continues to progress overall with strengthening overhead for 75% of the session with good tolerance and no increased pain for continued progression.    Kleber Is progressing well towards his goals.   Pt prognosis is Good.     Pt will continue to benefit from skilled outpatient physical therapy to address the deficits listed in the problem list box on initial evaluation, provide pt/family education and to maximize pt's level of independence in the home and community environment.     Pt's spiritual, cultural and educational needs considered and pt agreeable to plan of care and goals.     Anticipated barriers to physical therapy: prolonged break in care     Goals: Short Term Goals: 3 weeks   Pt to be Independent with HEP for increased strength, endurance and functional mobility. MET  Pt to have decreased pain 2/10 for increased functional mobility and tolerance. MET  Pt to increase AROM to 120 degrees R shoulder flexion and abduction for increased functional mobility. MET        Long Term Goals: 10 weeks   Pt to be Independent with pain management strategies and verbalize 2 for increased strength, endurance and functional mobility.  Pt to have decreased pain 0/10 for increased functional mobility and tolerance. MET  Pt to increase AROM to 155 degrees R shoulder flexion and abduction for increased functional mobility. progressing  Pt to increase activity tolerance to 1 hrs for without increased pain for increased overall functional activity.  Pt to increase R shoulder flexion, abduction and ER to 4/5 for increased functional strength and activity.     Plan      Plan of care Certification: 1/7/2025 to 3/31/25 extend POC to 5/31/25     Outpatient Physical Therapy 2 times weekly for 10 weeks to include the following interventions: Cervical/Lumbar Traction, Electrical Stimulation DN, Manual Therapy, Moist Heat/ Ice, Neuromuscular Re-ed, Patient Education,  Therapeutic Activities, and Therapeutic Exercise.     Fabian Choi, PTA

## 2025-04-03 ENCOUNTER — CLINICAL SUPPORT (OUTPATIENT)
Dept: REHABILITATION | Facility: HOSPITAL | Age: 70
End: 2025-04-03
Payer: MEDICARE

## 2025-04-03 DIAGNOSIS — R29.898 UPPER EXTREMITY WEAKNESS: ICD-10-CM

## 2025-04-03 DIAGNOSIS — R29.3 ABNORMAL POSTURE: ICD-10-CM

## 2025-04-03 DIAGNOSIS — M25.611 DECREASED RIGHT SHOULDER RANGE OF MOTION: Primary | ICD-10-CM

## 2025-04-03 PROCEDURE — 97112 NEUROMUSCULAR REEDUCATION: CPT | Mod: HCNC,PO

## 2025-04-03 PROCEDURE — 97530 THERAPEUTIC ACTIVITIES: CPT | Mod: HCNC,PO

## 2025-04-05 NOTE — PROGRESS NOTES
Physical Therapy Daily Treatment Note     [facility]  [facility] Number: [id number]    Therapy Diagnosis:   Encounter Diagnoses   Name Primary?    Decreased right shoulder range of motion Yes    Abnormal posture     Upper extremity weakness        Physician: Nicole Kurtz, NP-C    Visit Date: 4/3/2025    Physician Orders: PT Eval and Treat   Medical Diagnosis from Referral: S49.90XD (ICD-10-CM) - Shoulder injury, unspecified laterality, subsequent encounter  Evaluation Date: 1/7/2025  Authorization Period Expiration: 12/31/25  Plan of Care Expiration: 3/31/25 extend POC 5/31/25  Progress Note Due: 4/13/25  Date of Surgery: NA  Visit # / Visits authorized: 15/ 20  FOTO: 2/ 3       Time In: 308 pm   Time Out: 400 pm  Total Time: 52 minutes  Total Billable Time: 25 minutes      Precautions: [place]    Subjective     Pt reports: continued improved R shoulder mobility and notes he has increased frequency of completion of home exercises recently    He was compliant with home exercise program.  Response to previous treatment: no adverse effects  Functional change: able to reach top of my head.    Pain: 0/10  Location: right shoulder      Objective     4/3/25  R shoulder  Standing AROM flexion : 145 ;  abduction 120 ; ER 40  AAROM flexion : 150;  abduction 145;  ER 60       Treatment      Kleber received therapeutic exercises to develop strength, endurance, ROM, flexibility, posture and core stabilization for 10 minutes including:    Upper trap stretch 3 x 30 secs   Levator stretch 3 x 30 secs   Supine stretch towel roll x 3 min vertical  Supine stretch towel roll horizontal T2-T4 level x 3 min     (Patient instructed to lift arms as needed in stretches.)    Kleber received the following manual therapy techniques: Joint mobilizations, Manual traction, and Soft tissue Mobilization were applied to the:  for 00 minutes, including:      Kleber participated in neuromuscular re-education activities to improve: Balance,  Coordination, Proprioception and Posture for 25 minutes. The following activities were include    Scapular retraction 2 x 10 hold 3 secs NP  No Money RTB 2 x 10 NP  Cervical retraction 2 x 10 hold 3 secs NP  Supine HOB elevated 45 deg horizontal abduction 2 x 15 green T band   Supine HOB  elevated 45 deg diagonals  2 x 10 green Tband  Supine IR/ ER x 30 2 # weight small amplitude-NP  Standing ER with YTB as tolerated 2 x 10-  Rows standing green Tband 3 x 10-  Shld extension red tband 2 x 10-np  Serratus wall slides 1 x 10-np  Single arm lat pull downs 7# 3 x 10-np    Kleber participated in dynamic functional therapeutic activities to improve functional performance for 17 minutes, including:  Pulleys flex/abd x 2 minutes ea  [place] 5# dowel 3 x 15 into flexion /scaption  and 3x 10 scaption/abduction +2# ankle weight around wrist  Cabinet reach (no resistance) 2 x 10  No weight serratus punches overhead and eccentric lowering to improve reaching in flexion and then scaption towards abduction 2 x 10 each    Np:  Pulley eccentric lowering control on RUE x 1 min  Seated table slides at incline flex/scap x 30 ea          Kleber received cold pack for 00 minutes to R shoulder.      Home Exercises Provided and Patient Education Provided     Education provided:   - HEP review    Written Home Exercises Provided: Patient instructed to cont prior HEP.  Exercises were reviewed and Kleber was able to demonstrate them prior to the end of the session.  Kleber demonstrated good  understanding of the education provided.     See EMR under Patient Instructions for exercises provided prior visit.    Assessment   Kleber returned without complaints of R shoulder pain and continues to note improved functional R shoulder mobility. Increased emphasis on functional shoulder strengthening by performing more exercises in standing and further eccentric strengthening and controlled. Pt had some exercise reduced to supine HOB elevated to 45  deg to increased workload without pain or compensatory mechanics at shoulder.   Pt with minimal pain today and arrived with AROM flexion 160 without complaints or facial grimace with AROM today. Pt continues to progress overall with strengthening overhead for 75% of the session with good tolerance and no increased pain for continued progression.    Kleber Is progressing well towards his goals.   Pt prognosis is Good.     Pt will continue to benefit from skilled outpatient physical therapy to address the deficits listed in the problem list box on initial evaluation, provide pt/family education and to maximize pt's level of independence in the home and community environment.     Pt's spiritual, cultural and educational needs considered and pt agreeable to plan of care and goals.     Anticipated barriers to physical therapy: prolonged break in care     Goals: Short Term Goals: 3 weeks   Pt to be Independent with HEP for increased strength, endurance and functional mobility. MET  Pt to have decreased pain 2/10 for increased functional mobility and tolerance. MET  Pt to increase AROM to 120 degrees R shoulder flexion and abduction for increased functional mobility. MET        Long Term Goals: 10 weeks   Pt to be Independent with pain management strategies and verbalize 2 for increased strength, endurance and functional mobility.  Pt to have decreased pain 0/10 for increased functional mobility and tolerance. MET  Pt to increase AROM to 155 degrees R shoulder flexion and abduction for increased functional mobility. progressing  Pt to increase activity tolerance to 1 hrs for without increased pain for increased overall functional activity.  Pt to increase R shoulder flexion, abduction and ER to 4/5 for increased functional strength and activity.     Plan      Plan of care Certification: 1/7/2025 to 3/31/25 extend POC to 5/31/25     Outpatient Physical Therapy 2 times weekly for 10 weeks to include the following  interventions: Cervical/Lumbar Traction, Electrical Stimulation DN, Manual Therapy, Moist Heat/ Ice, Neuromuscular Re-ed, Patient Education, Therapeutic Activities, and Therapeutic Exercise.     Roxy Cortez, PT

## 2025-04-07 DIAGNOSIS — F41.9 ANXIETY: ICD-10-CM

## 2025-04-08 NOTE — TELEPHONE ENCOUNTER
No care due was identified.  [facility] Embedded Care Due Messages. Reference number: [id number].   4/07/2025 7:47:55 PM CDT

## 2025-04-10 ENCOUNTER — CLINICAL SUPPORT (OUTPATIENT)
Dept: REHABILITATION | Facility: HOSPITAL | Age: 70
End: 2025-04-10
Payer: MEDICARE

## 2025-04-10 DIAGNOSIS — R29.3 ABNORMAL POSTURE: ICD-10-CM

## 2025-04-10 DIAGNOSIS — M25.611 DECREASED RIGHT SHOULDER RANGE OF MOTION: Primary | ICD-10-CM

## 2025-04-10 DIAGNOSIS — R29.898 UPPER EXTREMITY WEAKNESS: ICD-10-CM

## 2025-04-10 PROCEDURE — 97112 NEUROMUSCULAR REEDUCATION: CPT | Mod: PO

## 2025-04-10 PROCEDURE — 97530 THERAPEUTIC ACTIVITIES: CPT | Mod: PO

## 2025-04-10 RX ORDER — PANTOPRAZOLE SODIUM 40 MG/1
40 TABLET, DELAYED RELEASE ORAL
Qty: 90 TABLET | Refills: 3 | Status: SHIPPED | OUTPATIENT
Start: 2025-04-10

## 2025-04-10 NOTE — TELEPHONE ENCOUNTER
No care due was identified.  [facility] Embedded Care Due Messages. Reference number: [id number].   4/10/2025 5:28:04 AM CDT

## 2025-04-12 NOTE — PROGRESS NOTES
Physical Therapy Daily Treatment Note     [facility]  [facility] Number: [id number]    Therapy Diagnosis:   Encounter Diagnoses   Name Primary?    Decreased right shoulder range of motion Yes    Abnormal posture     Upper extremity weakness        Physician: Nicole Kurtz, NP-C    Visit Date: 4/10/2025    Physician Orders: PT Eval and Treat   Medical Diagnosis from Referral: S49.90XD (ICD-10-CM) - Shoulder injury, unspecified laterality, subsequent encounter  Evaluation Date: 1/7/2025  Authorization Period Expiration: 12/31/25  Plan of Care Expiration: 3/31/25 extend POC 5/31/25  Progress Note Due: 4/13/25  Date of Surgery: NA  Visit # / Visits authorized: 15/ 20  FOTO: 2/ 3       Time In: 324 pm   Time Out: 405 pm  Total Time: 52 minutes  Total Billable Time: 25 minutes      Precautions: [place]    Subjective     Pt reports: continued improved R shoulder mobility and notes he has increased frequency of completion of home exercises recently    He was compliant with home exercise program.  Response to previous treatment: no adverse effects  Functional change: able to reach top of my head.    Pain: 0/10  Location: right shoulder      Objective     4/3/25  R shoulder  Standing AROM flexion : 145 ;  abduction 120 ; ER 40  AAROM flexion : 150;  abduction 145;  ER 60       Treatment      Kleber received therapeutic exercises to develop strength, endurance, ROM, flexibility, posture and core stabilization for 10 minutes including:    Upper trap stretch 3 x 30 secs   Levator stretch 3 x 30 secs   Supine stretch towel roll x 3 min vertical  Supine stretch towel roll horizontal T2-T4 level x 3 min     (Patient instructed to lift arms as needed in stretches.)    Kleber received the following manual therapy techniques: Joint mobilizations, Manual traction, and Soft tissue Mobilization were applied to the:  for 00 minutes, including:      Kleber participated in neuromuscular re-education activities to improve: Balance,  Coordination, Proprioception and Posture for 25 minutes. The following activities were include    Scapular retraction 2 x 10 hold 3 secs NP  No Money RTB 2 x 10 NP  Cervical retraction 2 x 10 hold 3 secs NP  Supine HOB elevated 45 deg horizontal abduction 2 x 15 green T band   Supine HOB  elevated 45 deg diagonals  2 x 10 green Tband  Supine IR/ ER x 30 2 # weight small amplitude-NP  Standing ER with YTB as tolerated 2 x 10-  Rows standing green Tband 3 x 10-  Shld extension red tband 2 x 10-np  Serratus wall slides 1 x 10-np  Single arm lat pull downs 7# 3 x 10-np    Kleber participated in dynamic functional therapeutic activities to improve functional performance for 16 minutes, including:  Pulleys flex/abd x 2 minutes ea  [place] 5# dowel 3 x 15 into flexion /scaption  and 3x 10 scaption/abduction +2# ankle weight around wrist  Cabinet reach (no resistance) 2 x 10  No weight serratus punches overhead and eccentric lowering to improve reaching in flexion and then scaption towards abduction 2 x 10 each    Np:  Pulley eccentric lowering control on RUE x 1 min  Seated table slides at incline flex/scap x 30 ea          Kleber received cold pack for 00 minutes to R shoulder.      Home Exercises Provided and Patient Education Provided     Education provided:   - HEP review    Written Home Exercises Provided: Patient instructed to cont prior HEP.  Exercises were reviewed and Kleber was able to demonstrate them prior to the end of the session.  Kleber demonstrated good  understanding of the education provided.     See EMR under Patient Instructions for exercises provided prior visit.    Assessment   Kleber returned without complaints of R shoulder pain and continues to increased overall AROM and strength overhead. Pt has pain well controlled and progressing with good insight with HEP and needs additional visits to achieve 5/5 strength in R shoulder for independent IADLs and return to prior level    Kleber Is progressing  well towards his goals.   Pt prognosis is Good.     Pt will continue to benefit from skilled outpatient physical therapy to address the deficits listed in the problem list box on initial evaluation, provide pt/family education and to maximize pt's level of independence in the home and community environment.     Pt's spiritual, cultural and educational needs considered and pt agreeable to plan of care and goals.     Anticipated barriers to physical therapy: prolonged break in care     Goals: Short Term Goals: 3 weeks   Pt to be Independent with HEP for increased strength, endurance and functional mobility. MET  Pt to have decreased pain 2/10 for increased functional mobility and tolerance. MET  Pt to increase AROM to 120 degrees R shoulder flexion and abduction for increased functional mobility. MET        Long Term Goals: 10 weeks   Pt to be Independent with pain management strategies and verbalize 2 for increased strength, endurance and functional mobility.  Pt to have decreased pain 0/10 for increased functional mobility and tolerance. MET  Pt to increase AROM to 155 degrees R shoulder flexion and abduction for increased functional mobility. progressing  Pt to increase activity tolerance to 1 hrs for without increased pain for increased overall functional activity.  Pt to increase R shoulder flexion, abduction and ER to 4/5 for increased functional strength and activity.     Plan      Plan of care Certification: 1/7/2025 to 3/31/25 extend POC to 5/31/25     Outpatient Physical Therapy 2 times weekly for 10 weeks to include the following interventions: Cervical/Lumbar Traction, Electrical Stimulation DN, Manual Therapy, Moist Heat/ Ice, Neuromuscular Re-ed, Patient Education, Therapeutic Activities, and Therapeutic Exercise.     Roxy Cortez, PT

## 2025-04-15 ENCOUNTER — CLINICAL SUPPORT (OUTPATIENT)
Dept: REHABILITATION | Facility: HOSPITAL | Age: 70
End: 2025-04-15
Payer: MEDICARE

## 2025-04-15 DIAGNOSIS — R29.3 ABNORMAL POSTURE: ICD-10-CM

## 2025-04-15 DIAGNOSIS — M25.611 DECREASED RIGHT SHOULDER RANGE OF MOTION: Primary | ICD-10-CM

## 2025-04-15 DIAGNOSIS — R29.898 UPPER EXTREMITY WEAKNESS: ICD-10-CM

## 2025-04-15 PROCEDURE — 97530 THERAPEUTIC ACTIVITIES: CPT | Mod: PO,CQ

## 2025-04-15 PROCEDURE — 97110 THERAPEUTIC EXERCISES: CPT | Mod: PO,CQ

## 2025-04-15 PROCEDURE — 97112 NEUROMUSCULAR REEDUCATION: CPT | Mod: PO,CQ

## 2025-04-15 NOTE — PROGRESS NOTES
Physical Therapy Daily Treatment Note     [facility]  [facility] Number: [id number]    Therapy Diagnosis:   Encounter Diagnoses   Name Primary?    Decreased right shoulder range of motion Yes    Abnormal posture     Upper extremity weakness        Physician: Nicole Kurtz, NP-C    Visit Date: 4/15/2025    Physician Orders: PT Eval and Treat   Medical Diagnosis from Referral: S49.90XD (ICD-10-CM) - Shoulder injury, unspecified laterality, subsequent encounter  Evaluation Date: 1/7/2025  Authorization Period Expiration: 12/31/25  Plan of Care Expiration: 3/31/25 extend POC 5/31/25  Progress Note Due: 4/13/25  Date of Surgery: NA  Visit # / Visits authorized: 15/ 20  FOTO: 2/ 3       Time In: 2:10 pm   Time Out: 2:58 pm  Total Time: 48 minutes  Total Billable Time: 48 minutes      Precautions: ANA    Subjective     Pt reports: increased B shoulder soreness following completion of home exercises prior to today's treatment    He was compliant with home exercise program.  Response to previous treatment: no adverse effects  Functional change: able to reach top of my head.    Pain: 0/10  Location: right shoulder      Objective     4/3/25  R shoulder  Standing AROM flexion : 145 ;  abduction 120 ; ER 40  AAROM flexion : 150;  abduction 145;  ER 60       Treatment      Kleber received therapeutic exercises to develop strength, endurance, ROM, flexibility, posture and core stabilization for 10 minutes including:    Upper trap stretch 3 x 30 secs   Levator stretch 3 x 30 secs   Supine stretch towel roll x 3 min vertical  Supine stretch towel roll horizontal T2-T4 level x 3 min     (Patient instructed to lift arms as needed in stretches.)    Kleber received the following manual therapy techniques: Joint mobilizations, Manual traction, and Soft tissue Mobilization were applied to the:  for 00 minutes, including:      Kleber participated in neuromuscular re-education activities to improve: Balance, Coordination,  Proprioception and Posture for 25 minutes. The following activities were include    Scapular retraction 2 x 10 hold 3 secs NP  No Money RTB 2 x 10 NP  Cervical retraction 2 x 10 hold 3 secs NP  Supine HOB elevated 45 deg horizontal abduction 2 x 15 green T band   Supine HOB  elevated 45 deg diagonals  2 x 10 green Tband  Supine IR/ ER x 30 2 # weight small amplitude-NP  Standing ER with YTB as tolerated 2 x 10-  Rows standing green Tband 3 x 10-  Shld extension red tband 2 x 10-np  Serratus wall slides 1 x 10-np  Single arm lat pull downs 7# 3 x 10-np  UBE x 6 minutes (forward/backward)    Kleber participated in dynamic functional therapeutic activities to improve functional performance for 13 minutes, including:  Pulleys flex/abd x 2 minutes ea  [place] 5# dowel 3 x 15 into flexion /scaption  and 3x 10 scaption/abduction +2# ankle weight around wrist  Cabinet reach (no resistance) 2 x 10  No weight serratus punches overhead and eccentric lowering to improve reaching in flexion and then scaption towards abduction 2 x 10 each    Np:  Pulley eccentric lowering control on RUE x 1 min  Seated table slides at incline flex/scap x 30 ea          Kleber received cold pack for 00 minutes to R shoulder.      Home Exercises Provided and Patient Education Provided     Education provided:   - HEP review    Written Home Exercises Provided: Patient instructed to cont prior HEP.  Exercises were reviewed and Kleber was able to demonstrate them prior to the end of the session.  Kleber demonstrated good  understanding of the education provided.     See EMR under Patient Instructions for exercises provided prior visit.    Assessment   Kleber returned without complaints of R shoulder pain and continues to increased overall AROM and strength overhead. Pt continues to required frequent cueing to slow pace of exercises to allow for proper recruitment of musculature and maintain proper form. Will continue to progress per pt's tolerance to  improved overall functional shoulder mobility and strength.    lKeber Is progressing well towards his goals.   Pt prognosis is Good.     Pt will continue to benefit from skilled outpatient physical therapy to address the deficits listed in the problem list box on initial evaluation, provide pt/family education and to maximize pt's level of independence in the home and community environment.     Pt's spiritual, cultural and educational needs considered and pt agreeable to plan of care and goals.     Anticipated barriers to physical therapy: prolonged break in care     Goals: Short Term Goals: 3 weeks   Pt to be Independent with HEP for increased strength, endurance and functional mobility. MET  Pt to have decreased pain 2/10 for increased functional mobility and tolerance. MET  Pt to increase AROM to 120 degrees R shoulder flexion and abduction for increased functional mobility. MET        Long Term Goals: 10 weeks   Pt to be Independent with pain management strategies and verbalize 2 for increased strength, endurance and functional mobility.  Pt to have decreased pain 0/10 for increased functional mobility and tolerance. MET  Pt to increase AROM to 155 degrees R shoulder flexion and abduction for increased functional mobility. progressing  Pt to increase activity tolerance to 1 hrs for without increased pain for increased overall functional activity.  Pt to increase R shoulder flexion, abduction and ER to 4/5 for increased functional strength and activity.     Plan      Plan of care Certification: 1/7/2025 to 3/31/25 extend POC to 5/31/25     Outpatient Physical Therapy 2 times weekly for 10 weeks to include the following interventions: Cervical/Lumbar Traction, Electrical Stimulation DN, Manual Therapy, Moist Heat/ Ice, Neuromuscular Re-ed, Patient Education, Therapeutic Activities, and Therapeutic Exercise.     Fabian Choi, PTA

## 2025-04-17 ENCOUNTER — CLINICAL SUPPORT (OUTPATIENT)
Dept: REHABILITATION | Facility: HOSPITAL | Age: 70
End: 2025-04-17
Payer: MEDICARE

## 2025-04-17 DIAGNOSIS — R29.3 ABNORMAL POSTURE: ICD-10-CM

## 2025-04-17 DIAGNOSIS — M25.611 DECREASED RIGHT SHOULDER RANGE OF MOTION: Primary | ICD-10-CM

## 2025-04-17 DIAGNOSIS — R29.898 UPPER EXTREMITY WEAKNESS: ICD-10-CM

## 2025-04-17 PROCEDURE — 97112 NEUROMUSCULAR REEDUCATION: CPT | Mod: PO

## 2025-04-17 PROCEDURE — 97530 THERAPEUTIC ACTIVITIES: CPT | Mod: PO

## 2025-04-20 NOTE — PROGRESS NOTES
Physical Therapy Daily Treatment Note     Name: Donald Warren Abadie  [facility] Number: [id number]    Therapy Diagnosis:   Encounter Diagnoses   Name Primary?    Decreased right shoulder range of motion Yes    Abnormal posture     Upper extremity weakness        Physician: Nicole Kurtz, NP-C    Visit Date: 4/17/2025    Physician Orders: PT Eval and Treat   Medical Diagnosis from Referral: S49.90XD (ICD-10-CM) - Shoulder injury, unspecified laterality, subsequent encounter  Evaluation Date: 1/7/2025  Authorization Period Expiration: 12/31/25  Plan of Care Expiration: 3/31/25 extend POC 5/31/25  Progress Note Due: 4/13/25  Date of Surgery: NA  Visit # / Visits authorized: 15/ 20  FOTO: 2/ 3       Time In: 353 pm   Time Out: 442 pm  Total Time: 49 minutes  Total Billable Time: 49 minutes      Precautions: [place]    Subjective     Pt reports: increased B shoulder strength overhead and I am almost there    He was compliant with home exercise program.  Response to previous treatment: no adverse effects  Functional change: able to reach top of my head.    Pain: 0/10  Location: right shoulder      Objective     4/3/25  R shoulder  Standing AROM flexion : 145 ;  abduction 120 ; ER 40  AAROM flexion : 150;  abduction 145;  ER 60       Treatment      Kleber received therapeutic exercises to develop strength, endurance, ROM, flexibility, posture and core stabilization for 0 minutes including:    Upper trap stretch 3 x 30 secs   Levator stretch 3 x 30 secs   Supine stretch towel roll x 3 min vertical  Supine stretch towel roll horizontal T2-T4 level x 3 min     (Patient instructed to lift arms as needed in stretches.)    Kleber received the following manual therapy techniques: Joint mobilizations, Manual traction, and Soft tissue Mobilization were applied to the:  for 00 minutes, including:      Kleber participated in neuromuscular re-education activities to improve: Balance, Coordination, Proprioception and Posture for 25 minutes. The  following activities were include    Scapular retraction 2 x 10 hold 3 secs NP  No Money RTB 2 x 10 NP  Cervical retraction 2 x 10 hold 3 secs NP  Supine HOB elevated 45 deg horizontal abduction 2 x 15 green T band   Supine HOB  elevated 45 deg diagonals  2 x 10 green Tband  Supine IR/ ER x 30 2 # weight small amplitude-NP  Standing ER with YTB as tolerated 2 x 10-  Rows standing green Tband 3 x 10-  Shld extension red tband 2 x 10-np  Serratus wall slides 1 x 10-np  Single arm lat pull downs 7# 3 x 10-np  UBE x 6 minutes (forward/backward)    Kleber participated in dynamic functional therapeutic activities to improve functional performance for 24 minutes, including:  Pulleys flex/abd x 2 minutes ea  [place] 5# dowel 3 x 15 into flexion /scaption  and 3x 10 scaption/abduction +5# ankle weight around wrist  Cabinet reach (# 2 x 10  No weight serratus punches overhead and eccentric lowering to improve reaching in flexion and then scaption towards abduction 2 x 10 each  Unilateral lat pulldown 13# 2 x 10 to improve overhead reaching tolerance.    Np:  Pulley eccentric lowering control on RUE x 1 min  Seated table slides at incline flex/scap x 30 ea          Kleber received cold pack for 00 minutes to R shoulder.      Home Exercises Provided and Patient Education Provided     Education provided:   - HEP review    Written Home Exercises Provided: Patient instructed to cont prior HEP.  Exercises were reviewed and Kleber was able to demonstrate them prior to the end of the session.  Kleber demonstrated good  understanding of the education provided.     See EMR under Patient Instructions for exercises provided prior visit.    Assessment   Kleber returned without complaints of R shoulder pain and continues to increased overall AROM and strength overhead. Pt continues to required frequent cueing to slow pace of exercises to allow for proper recruitment of musculature and maintain proper form. Will continue to progress per  pt's tolerance to improved overall functional shoulder mobility and strength with request for 6 additional visits to improve overhead strength for functional activity long term.    Kleber Is progressing well towards his goals.   Pt prognosis is Good.     Pt will continue to benefit from skilled outpatient physical therapy to address the deficits listed in the problem list box on initial evaluation, provide pt/family education and to maximize pt's level of independence in the home and community environment.     Pt's spiritual, cultural and educational needs considered and pt agreeable to plan of care and goals.     Anticipated barriers to physical therapy: prolonged break in care     Goals: Short Term Goals: 3 weeks   Pt to be Independent with HEP for increased strength, endurance and functional mobility. MET  Pt to have decreased pain 2/10 for increased functional mobility and tolerance. MET  Pt to increase AROM to 120 degrees R shoulder flexion and abduction for increased functional mobility. MET        Long Term Goals: 10 weeks   Pt to be Independent with pain management strategies and verbalize 2 for increased strength, endurance and functional mobility.  Pt to have decreased pain 0/10 for increased functional mobility and tolerance. MET  Pt to increase AROM to 155 degrees R shoulder flexion and abduction for increased functional mobility MET  Pt to increase activity tolerance to 1 hrs for without increased pain for increased overall functional activity. progressing  Pt to increase R shoulder flexion, abduction and ER to 4/5 for increased functional strength and activity. progressing     Plan      Plan of care Certification: 1/7/2025 to 3/31/25 extend POC to 5/31/25     Outpatient Physical Therapy 2 times weekly for 10 weeks to include the following interventions: Cervical/Lumbar Traction, Electrical Stimulation DN, Manual Therapy, Moist Heat/ Ice, Neuromuscular Re-ed, Patient Education, Therapeutic Activities,  and Therapeutic Exercise.     Roxy Cortez, PT

## 2025-04-22 RX ORDER — SERTRALINE HYDROCHLORIDE 50 MG/1
50 TABLET, FILM COATED ORAL
Qty: 30 TABLET | Refills: 11 | OUTPATIENT
Start: 2025-04-22

## 2025-04-29 ENCOUNTER — TELEPHONE (OUTPATIENT)
Dept: ELECTROPHYSIOLOGY | Facility: CLINIC | Age: 70
End: 2025-04-29
Payer: MEDICARE

## 2025-04-29 NOTE — TELEPHONE ENCOUNTER
Spoke to patient's daughter. Patient daughter reports patient is having concerns about whether to have the recommended Ablation procedure on 5/6/25. Patient reports not currently drinking alcohol and the symptoms of shortness of breath occurs with exertion. Patient is also not on any anti arrhythmia medication at the moment. HR 60-80s and in episodes of Afib 110-120s. Patient would like to see if they have other options instead of the ablation that is less invasive. Will inform Dr. Angel.         ----- Message from Nghia Franco sent at 4/29/2025  4:30 PM CDT -----    ----- Message -----  From: Ana Gandhi  Sent: 4/29/2025  12:09 PM CDT  To: [facility] SOLOMON Staff    Patient have some questions about this procedure. Please call back @ [phone]. Thank you

## 2025-04-30 ENCOUNTER — TELEPHONE (OUTPATIENT)
Dept: ELECTROPHYSIOLOGY | Facility: CLINIC | Age: 70
End: 2025-04-30
Payer: MEDICARE

## 2025-04-30 NOTE — TELEPHONE ENCOUNTER
Informed Dr. Angel of patient's questions about if the ablation is still needed. Per Dr. Angel patient can come in to discuss the procedure further if needed. Informed patient of Dr. Angel recommendation. Due to patient still experiencing afib intermittently. Patient will agree with the scheduled ablation patient agrees with continuing with the  procedure for 5/6/2025. Notified patient a pre op call will be made before the scheduled procedure. Patient verbalized understanding and appreciated the call.

## 2025-05-01 ENCOUNTER — CLINICAL SUPPORT (OUTPATIENT)
Dept: REHABILITATION | Facility: HOSPITAL | Age: 70
End: 2025-05-01
Payer: MEDICARE

## 2025-05-01 ENCOUNTER — LAB VISIT (OUTPATIENT)
Dept: LAB | Facility: HOSPITAL | Age: 70
End: 2025-05-01
Attending: INTERNAL MEDICINE
Payer: MEDICARE

## 2025-05-01 DIAGNOSIS — M25.611 DECREASED RIGHT SHOULDER RANGE OF MOTION: Primary | ICD-10-CM

## 2025-05-01 DIAGNOSIS — R29.898 UPPER EXTREMITY WEAKNESS: ICD-10-CM

## 2025-05-01 DIAGNOSIS — R29.3 ABNORMAL POSTURE: ICD-10-CM

## 2025-05-01 DIAGNOSIS — I48.0 PAROXYSMAL ATRIAL FIBRILLATION: ICD-10-CM

## 2025-05-01 LAB
ANION GAP (OHS): 8 MMOL/L (ref 8–16)
APTT PPP: 27.1 SECONDS (ref 21–32)
BUN SERPL-MCNC: 8 MG/DL (ref 8–23)
CALCIUM SERPL-MCNC: 9.5 MG/DL (ref 8.7–10.5)
CHLORIDE SERPL-SCNC: 102 MMOL/L (ref 95–110)
CO2 SERPL-SCNC: 28 MMOL/L (ref 23–29)
CREAT SERPL-MCNC: 1.1 MG/DL (ref 0.5–1.4)
ERYTHROCYTE [DISTWIDTH] IN BLOOD BY AUTOMATED COUNT: 14.4 % (ref 11.5–14.5)
GFR SERPLBLD CREATININE-BSD FMLA CKD-EPI: >60 ML/MIN/1.73/M2
GLUCOSE SERPL-MCNC: 111 MG/DL (ref 70–110)
HCT VFR BLD AUTO: 41.2 % (ref 40–54)
HGB BLD-MCNC: 13.3 GM/DL (ref 14–18)
INR PPP: 1 (ref 0.8–1.2)
MCH RBC QN AUTO: 29.4 PG (ref 27–31)
MCHC RBC AUTO-ENTMCNC: 32.3 G/DL (ref 32–36)
MCV RBC AUTO: 91 FL (ref 82–98)
PLATELET # BLD AUTO: 214 K/UL (ref 150–450)
PMV BLD AUTO: 10 FL (ref 9.2–12.9)
POTASSIUM SERPL-SCNC: 4.6 MMOL/L (ref 3.5–5.1)
PROTHROMBIN TIME: 11.6 SECONDS (ref 9–12.5)
RBC # BLD AUTO: 4.52 M/UL (ref 4.6–6.2)
SODIUM SERPL-SCNC: 138 MMOL/L (ref 136–145)
WBC # BLD AUTO: 8.83 K/UL (ref 3.9–12.7)

## 2025-05-01 PROCEDURE — 85610 PROTHROMBIN TIME: CPT | Mod: PO

## 2025-05-01 PROCEDURE — 97530 THERAPEUTIC ACTIVITIES: CPT | Mod: HCNC,PO

## 2025-05-01 PROCEDURE — 85027 COMPLETE CBC AUTOMATED: CPT

## 2025-05-01 PROCEDURE — 85730 THROMBOPLASTIN TIME PARTIAL: CPT | Mod: PO

## 2025-05-01 PROCEDURE — 97112 NEUROMUSCULAR REEDUCATION: CPT | Mod: HCNC,PO

## 2025-05-01 PROCEDURE — 36415 COLL VENOUS BLD VENIPUNCTURE: CPT | Mod: PO

## 2025-05-01 PROCEDURE — 80048 BASIC METABOLIC PNL TOTAL CA: CPT

## 2025-05-03 NOTE — PROGRESS NOTES
Physical Therapy Daily Treatment Note     [facility]  [facility] Number: [id number]    Therapy Diagnosis:   Encounter Diagnoses   Name Primary?    Decreased right shoulder range of motion Yes    Abnormal posture     Upper extremity weakness        Physician: Nicole Kurtz, NP-C    Visit Date: 5/1/2025    Physician Orders: PT Eval and Treat   Medical Diagnosis from Referral: S49.90XD (ICD-10-CM) - Shoulder injury, unspecified laterality, subsequent encounter  Evaluation Date: 1/7/2025  Authorization Period Expiration: 12/31/25  Plan of Care Expiration: 3/31/25 extend POC 5/31/25  Progress Note Due: 4/13/25  Date of Surgery: NA  Visit # / Visits authorized: 15/ 20  FOTO: 2/ 3       Time In: 343 pm   Time Out: 438 pm  Total Time: 55 minutes  Total Billable Time: 40 minutes      Precautions: Torres Martinez    Subjective     Pt reports: 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     He was compliant with home exercise program.  Response to previous treatment: no adverse effects  Functional change: able to reach top of my head.    Pain: 2/10  Location: right shoulder      Objective     4/3/25  R shoulder  Standing AROM flexion : 145 ;  abduction 120 ; ER 40  AAROM flexion : 150;  abduction 145;  ER 60       Treatment      Kleber received therapeutic exercises to develop strength, endurance, ROM, flexibility, posture and core stabilization for 0 minutes including:    Upper trap stretch 3 x 30 secs   Levator stretch 3 x 30 secs   Supine stretch towel roll x 3 min vertical  Supine stretch towel roll horizontal T2-T4 level x 3 min     (Patient instructed to lift arms as needed in stretches.)    Kleber received the following manual therapy techniques: Joint mobilizations, Manual traction, and Soft tissue Mobilization were applied to the:  for 2 minutes, including:  GH traction  Cervical traction      Kleber participated in neuromuscular re-education activities to improve: Balance, Coordination, Proprioception and  Posture for 28 minutes. The following activities were include    Scapular retraction 2 x 10 hold 3 secs NP  No Money RTB 2 x 10 NP  Cervical retraction 2 x 10 hold 3 secs NP  Supine HOB elevated 45 deg horizontal abduction 2 x 15 green T band   Supine HOB  elevated 45 deg diagonals  2 x 10 green Tband  Supine IR/ ER x 30 2 # weight small amplitude-NP  Standing ER with YTB as tolerated 2 x 10-  Rows standing green Tband 3 x 10-  Shld extension red tband 2 x 10-np  Serratus wall slides 1 x 10-np  Single arm lat pull downs 7# 3 x 10-np  UBE x 6 minutes (forward/backward)    Kleber participated in dynamic functional therapeutic activities to improve functional performance for 25 minutes, including:  Pulleys flex/abd x 2 minutes ea  Landmines 5# dowel 3 x 15 into flexion /scaption  and 3x 10 scaption/abduction +5# ankle weight around wrist  Cabinet reach (# 2 x 10  No weight serratus punches overhead and eccentric lowering to improve reaching in flexion and then scaption towards abduction 2 x 10 each  Unilateral lat pulldown 13# 2 x 10 to improve overhead reaching tolerance.    Np:  Pulley eccentric lowering control on RUE x 1 min  Seated table slides at incline flex/scap x 30 ea          Kleber received cold pack for 00 minutes to R shoulder.      Home Exercises Provided and Patient Education Provided     Education provided:   - HEP review    Written Home Exercises Provided: Patient instructed to cont prior HEP.  Exercises were reviewed and Kleber was able to demonstrate them prior to the end of the session.  Kleber demonstrated good  understanding of the education provided.     See EMR under Patient Instructions for exercises provided prior visit.    Assessment   Kleber returned without complaints of R shoulder pain and continues to increased overall AROM and strength overhead in session and progressing with ER range and strength in session but still the limiting factor with stability overhead.    Kleber Is progressing  well towards his goals.   Pt prognosis is Good.     Pt will continue to benefit from skilled outpatient physical therapy to address the deficits listed in the problem list box on initial evaluation, provide pt/family education and to maximize pt's level of independence in the home and community environment.     Pt's spiritual, cultural and educational needs considered and pt agreeable to plan of care and goals.     Anticipated barriers to physical therapy: prolonged break in care     Goals: Short Term Goals: 3 weeks   Pt to be Independent with HEP for increased strength, endurance and functional mobility. MET  Pt to have decreased pain 2/10 for increased functional mobility and tolerance. MET  Pt to increase AROM to 120 degrees R shoulder flexion and abduction for increased functional mobility. MET        Long Term Goals: 10 weeks   Pt to be Independent with pain management strategies and verbalize 2 for increased strength, endurance and functional mobility. progressing  Pt to have decreased pain 0/10 for increased functional mobility and tolerance. MET  Pt to increase AROM to 155 degrees R shoulder flexion and abduction for increased functional mobility MET  Pt to increase activity tolerance to 1 hrs for without increased pain for increased overall functional activity. progressing  Pt to increase R shoulder flexion, abduction and ER to 4/5 for increased functional strength and activity. progressing     Plan      Plan of care Certification: 1/7/2025 to 3/31/25 extend POC to 5/31/25     Outpatient Physical Therapy 2 times weekly for 10 weeks to include the following interventions: Cervical/Lumbar Traction, Electrical Stimulation DN, Manual Therapy, Moist Heat/ Ice, Neuromuscular Re-ed, Patient Education, Therapeutic Activities, and Therapeutic Exercise.     Roxy Cortez, PT

## 2025-05-05 ENCOUNTER — TELEPHONE (OUTPATIENT)
Dept: ELECTROPHYSIOLOGY | Facility: CLINIC | Age: 70
End: 2025-05-05
Payer: MEDICARE

## 2025-05-05 NOTE — TELEPHONE ENCOUNTER
Trae Angel MD Searls, Sydney, RN  Ok thanks          Previous Messages       ----- Message -----  From: Susie Moore RN  Sent: 5/5/2025   9:06 AM CDT  To: Trae Angel MD    Pt scheduled for PVI CTI ablation tomorrow. He has a very small spot on his left fourth toe. No weeping or drainage. His daughter said it's much better than before. She said it's from the pinky toenail rubbing into it.

## 2025-05-05 NOTE — TELEPHONE ENCOUNTER
Spoke to patient's daughter    CONFIRMED procedure arrival time of 7 am    Reiterated instructions including:    -Directions to check in desk    -NPO after midnight night prior to procedure. Fasting upon arrival to the hospital the day of the procedure. Patient allowed to drink water up until 2 hours prior to arrival due to IV fluid shortage.     -High importance of HOLDING Eliquis and Metformin on day of procedure (last dose on 5/5/25)    - Confirms no new meds prescribed or med changes since scheduling -     -Pre-procedure LABS Reviewed, no alerts noted    -Confirmed absence or presence of implanted device/stimulator n/a    -Confirmed no recent or current fever, cough, or shortness of breath.    -Confirmed no redness, rash, irritation, or yeast infection to skin/ chest / groin area. He has a very small spot on his left fourth toe. No weeping or drainage. His daughter said it's much better than before. She said it's from the pinky toenail rubbing into it.    Patient's daughter verbalized understanding of above, denies any further questions and appreciated the call.

## 2025-05-06 ENCOUNTER — ANESTHESIA EVENT (OUTPATIENT)
Dept: MEDSURG UNIT | Facility: HOSPITAL | Age: 70
End: 2025-05-06
Payer: MEDICARE

## 2025-05-06 ENCOUNTER — ANESTHESIA (OUTPATIENT)
Dept: MEDSURG UNIT | Facility: HOSPITAL | Age: 70
End: 2025-05-06
Payer: MEDICARE

## 2025-05-06 ENCOUNTER — HOSPITAL ENCOUNTER (OUTPATIENT)
Facility: HOSPITAL | Age: 70
Discharge: HOME | End: 2025-05-06
Attending: INTERNAL MEDICINE | Admitting: INTERNAL MEDICINE
Payer: MEDICARE

## 2025-05-06 ENCOUNTER — HOSPITAL ENCOUNTER (OUTPATIENT)
Dept: CARDIOLOGY | Facility: HOSPITAL | Age: 70
Discharge: HOME | End: 2025-05-06
Attending: INTERNAL MEDICINE | Admitting: INTERNAL MEDICINE
Payer: MEDICARE

## 2025-05-06 VITALS — BODY MASS INDEX: 29.86 KG/M2 | SYSTOLIC BLOOD PRESSURE: 134 MMHG | DIASTOLIC BLOOD PRESSURE: 65 MMHG | WEIGHT: 185 LBS

## 2025-05-06 VITALS
HEIGHT: 66 IN | WEIGHT: 185 LBS | BODY MASS INDEX: 29.73 KG/M2 | RESPIRATION RATE: 14 BRPM | OXYGEN SATURATION: 96 % | TEMPERATURE: 98 F | HEART RATE: 57 BPM | SYSTOLIC BLOOD PRESSURE: 116 MMHG | DIASTOLIC BLOOD PRESSURE: 64 MMHG

## 2025-05-06 DIAGNOSIS — I49.9 ARRHYTHMIA: ICD-10-CM

## 2025-05-06 DIAGNOSIS — I48.91 ATRIAL FIBRILLATION: ICD-10-CM

## 2025-05-06 DIAGNOSIS — I50.22 CHRONIC HFREF (HEART FAILURE WITH REDUCED EJECTION FRACTION): ICD-10-CM

## 2025-05-06 DIAGNOSIS — I48.0 PAROXYSMAL ATRIAL FIBRILLATION: ICD-10-CM

## 2025-05-06 DIAGNOSIS — I48.3 TYPICAL ATRIAL FLUTTER: ICD-10-CM

## 2025-05-06 LAB
BSA FOR ECHO PROCEDURE: 1.98 M2
OHS QRS DURATION: 88 MS
OHS QTC CALCULATION: 428 MS
POCT GLUCOSE: 192 MG/DL (ref 70–110)

## 2025-05-06 PROCEDURE — 93321 DOPPLER ECHO F-UP/LMTD STD: CPT | Mod: 26,HCNC,, | Performed by: STUDENT IN AN ORGANIZED HEALTH CARE EDUCATION/TRAINING PROGRAM

## 2025-05-06 PROCEDURE — 37000009 HC ANESTHESIA EA ADD 15 MINS: Mod: HCNC | Performed by: STUDENT IN AN ORGANIZED HEALTH CARE EDUCATION/TRAINING PROGRAM

## 2025-05-06 PROCEDURE — 82962 GLUCOSE BLOOD TEST: CPT | Mod: HCNC | Performed by: INTERNAL MEDICINE

## 2025-05-06 PROCEDURE — 37000008 HC ANESTHESIA 1ST 15 MINUTES: Mod: HCNC | Performed by: STUDENT IN AN ORGANIZED HEALTH CARE EDUCATION/TRAINING PROGRAM

## 2025-05-06 PROCEDURE — 93005 ELECTROCARDIOGRAM TRACING: CPT | Mod: HCNC

## 2025-05-06 PROCEDURE — 93320 DOPPLER ECHO COMPLETE: CPT | Mod: HCNC

## 2025-05-06 PROCEDURE — 93010 ELECTROCARDIOGRAM REPORT: CPT | Mod: HCNC,,, | Performed by: INTERNAL MEDICINE

## 2025-05-06 PROCEDURE — 25000242 PHARM REV CODE 250 ALT 637 W/ HCPCS: Mod: HCNC | Performed by: NURSE ANESTHETIST, CERTIFIED REGISTERED

## 2025-05-06 PROCEDURE — 25000003 PHARM REV CODE 250: Mod: HCNC | Performed by: NURSE ANESTHETIST, CERTIFIED REGISTERED

## 2025-05-06 PROCEDURE — 63600175 PHARM REV CODE 636 W HCPCS: Mod: HCNC | Performed by: NURSE ANESTHETIST, CERTIFIED REGISTERED

## 2025-05-06 PROCEDURE — 93312 ECHO TRANSESOPHAGEAL: CPT | Mod: 26,HCNC,, | Performed by: STUDENT IN AN ORGANIZED HEALTH CARE EDUCATION/TRAINING PROGRAM

## 2025-05-06 PROCEDURE — 93325 DOPPLER ECHO COLOR FLOW MAPG: CPT | Mod: 26,HCNC,, | Performed by: STUDENT IN AN ORGANIZED HEALTH CARE EDUCATION/TRAINING PROGRAM

## 2025-05-06 PROCEDURE — 25500020 PHARM REV CODE 255: Mod: HCNC | Performed by: STUDENT IN AN ORGANIZED HEALTH CARE EDUCATION/TRAINING PROGRAM

## 2025-05-06 PROCEDURE — 27201037 HC PRESSURE MONITORING SET UP: Mod: HCNC

## 2025-05-06 RX ORDER — KETAMINE HCL IN 0.9 % NACL 50 MG/5 ML
SYRINGE (ML) INTRAVENOUS
Status: DISCONTINUED | OUTPATIENT
Start: 2025-05-06 | End: 2025-05-06

## 2025-05-06 RX ORDER — HYDROMORPHONE HYDROCHLORIDE 1 MG/ML
0.2 INJECTION, SOLUTION INTRAMUSCULAR; INTRAVENOUS; SUBCUTANEOUS EVERY 5 MIN PRN
Status: DISCONTINUED | OUTPATIENT
Start: 2025-05-06 | End: 2025-05-06 | Stop reason: HOSPADM

## 2025-05-06 RX ORDER — FENTANYL CITRATE 50 UG/ML
INJECTION, SOLUTION INTRAMUSCULAR; INTRAVENOUS
Status: DISCONTINUED | OUTPATIENT
Start: 2025-05-06 | End: 2025-05-06

## 2025-05-06 RX ORDER — ROCURONIUM BROMIDE 10 MG/ML
INJECTION, SOLUTION INTRAVENOUS
Status: DISCONTINUED | OUTPATIENT
Start: 2025-05-06 | End: 2025-05-06

## 2025-05-06 RX ORDER — ALBUTEROL SULFATE 90 UG/1
INHALANT RESPIRATORY (INHALATION)
Status: DISCONTINUED | OUTPATIENT
Start: 2025-05-06 | End: 2025-05-06

## 2025-05-06 RX ORDER — DEXMEDETOMIDINE HYDROCHLORIDE 100 UG/ML
INJECTION, SOLUTION INTRAVENOUS
Status: DISCONTINUED | OUTPATIENT
Start: 2025-05-06 | End: 2025-05-06

## 2025-05-06 RX ORDER — PROPOFOL 10 MG/ML
VIAL (ML) INTRAVENOUS
Status: DISCONTINUED | OUTPATIENT
Start: 2025-05-06 | End: 2025-05-06

## 2025-05-06 RX ORDER — SODIUM CHLORIDE 0.9 % (FLUSH) 0.9 %
10 SYRINGE (ML) INJECTION EVERY 6 HOURS PRN
Status: DISCONTINUED | OUTPATIENT
Start: 2025-05-06 | End: 2025-05-06 | Stop reason: HOSPADM

## 2025-05-06 RX ORDER — ONDANSETRON HYDROCHLORIDE 2 MG/ML
INJECTION, SOLUTION INTRAVENOUS
Status: DISCONTINUED | OUTPATIENT
Start: 2025-05-06 | End: 2025-05-06

## 2025-05-06 RX ORDER — PHENYLEPHRINE HYDROCHLORIDE 10 MG/ML
INJECTION INTRAVENOUS
Status: DISCONTINUED | OUTPATIENT
Start: 2025-05-06 | End: 2025-05-06

## 2025-05-06 RX ORDER — ACETAMINOPHEN 325 MG/1
650 TABLET ORAL EVERY 4 HOURS PRN
Status: DISCONTINUED | OUTPATIENT
Start: 2025-05-06 | End: 2025-05-06 | Stop reason: HOSPADM

## 2025-05-06 RX ORDER — HALOPERIDOL LACTATE 5 MG/ML
0.5 INJECTION, SOLUTION INTRAMUSCULAR EVERY 10 MIN PRN
Status: DISCONTINUED | OUTPATIENT
Start: 2025-05-06 | End: 2025-05-06 | Stop reason: HOSPADM

## 2025-05-06 RX ORDER — LIDOCAINE HYDROCHLORIDE 20 MG/ML
INJECTION INTRAVENOUS
Status: DISCONTINUED | OUTPATIENT
Start: 2025-05-06 | End: 2025-05-06

## 2025-05-06 RX ORDER — GLUCAGON 1 MG
1 KIT INJECTION
Status: DISCONTINUED | OUTPATIENT
Start: 2025-05-06 | End: 2025-05-06 | Stop reason: HOSPADM

## 2025-05-06 RX ORDER — DEXAMETHASONE SODIUM PHOSPHATE 4 MG/ML
INJECTION, SOLUTION INTRA-ARTICULAR; INTRALESIONAL; INTRAMUSCULAR; INTRAVENOUS; SOFT TISSUE
Status: DISCONTINUED | OUTPATIENT
Start: 2025-05-06 | End: 2025-05-06

## 2025-05-06 RX ORDER — SUCRALFATE 1 G/1
1 TABLET ORAL 4 TIMES DAILY
Qty: 120 TABLET | Refills: 0 | Status: SHIPPED | OUTPATIENT
Start: 2025-05-06 | End: 2025-05-06 | Stop reason: HOSPADM

## 2025-05-06 RX ORDER — SUCCINYLCHOLINE CHLORIDE 20 MG/ML
INJECTION INTRAMUSCULAR; INTRAVENOUS
Status: DISCONTINUED | OUTPATIENT
Start: 2025-05-06 | End: 2025-05-06

## 2025-05-06 RX ADMIN — ROCURONIUM BROMIDE 10 MG: 10 INJECTION INTRAVENOUS at 09:05

## 2025-05-06 RX ADMIN — SUGAMMADEX 200 MG: 100 INJECTION, SOLUTION INTRAVENOUS at 09:05

## 2025-05-06 RX ADMIN — DEXMEDETOMIDINE 8 MCG: 200 INJECTION, SOLUTION INTRAVENOUS at 09:05

## 2025-05-06 RX ADMIN — ALBUTEROL SULFATE 4 PUFF: 108 INHALANT RESPIRATORY (INHALATION) at 09:05

## 2025-05-06 RX ADMIN — SODIUM CHLORIDE: 9 INJECTION, SOLUTION INTRAVENOUS at 09:05

## 2025-05-06 RX ADMIN — FENTANYL CITRATE 25 MCG: 50 INJECTION, SOLUTION INTRAMUSCULAR; INTRAVENOUS at 09:05

## 2025-05-06 RX ADMIN — PHENYLEPHRINE HYDROCHLORIDE 400 MCG: 10 INJECTION INTRAVENOUS at 09:05

## 2025-05-06 RX ADMIN — LIDOCAINE HYDROCHLORIDE 100 MG: 20 INJECTION INTRAVENOUS at 09:05

## 2025-05-06 RX ADMIN — FENTANYL CITRATE 50 MCG: 50 INJECTION, SOLUTION INTRAMUSCULAR; INTRAVENOUS at 09:05

## 2025-05-06 RX ADMIN — SUGAMMADEX 200 MG: 100 INJECTION, SOLUTION INTRAVENOUS at 10:05

## 2025-05-06 RX ADMIN — SUCCINYLCHOLINE CHLORIDE 120 MG: 20 INJECTION, SOLUTION INTRAMUSCULAR; INTRAVENOUS at 09:05

## 2025-05-06 RX ADMIN — Medication 30 MG: at 09:05

## 2025-05-06 RX ADMIN — DEXAMETHASONE SODIUM PHOSPHATE 4 MG: 4 INJECTION, SOLUTION INTRAMUSCULAR; INTRAVENOUS at 09:05

## 2025-05-06 RX ADMIN — ROCURONIUM BROMIDE 20 MG: 10 INJECTION INTRAVENOUS at 09:05

## 2025-05-06 RX ADMIN — ALBUTEROL SULFATE 4 PUFF: 108 INHALANT RESPIRATORY (INHALATION) at 10:05

## 2025-05-06 RX ADMIN — PERFLUTREN 1.5 ML: 6.52 INJECTION, SUSPENSION INTRAVENOUS at 10:05

## 2025-05-06 RX ADMIN — ONDANSETRON 4 MG: 2 INJECTION INTRAMUSCULAR; INTRAVENOUS at 09:05

## 2025-05-06 RX ADMIN — Medication 20 MG: at 09:05

## 2025-05-06 RX ADMIN — PROPOFOL 180 MG: 10 INJECTION, EMULSION INTRAVENOUS at 09:05

## 2025-05-06 NOTE — ANESTHESIA PREPROCEDURE EVALUATION
[facility]  Anesthesia Pre-Operative Evaluation   05/06/2025        Donald Warren Abadie, 1955  [id number]  Procedure(s) (LRB):  Ablation atrial fibrillation (N/A)  Ablation, Atrial Flutter, Typical (N/A)  Transesophageal echo (JOHN) intra-procedure log documentation (N/A)    Subjective    Donald Warren Abadie is a 69 y.o. male w/ a significant PMHx of AF/AFL, low normal EF (50%), small SDH after fall, small remote left PCA territory infarct noted on MRI brain 2024 (no residual deficits).    Patient now presents for above procedure(s).     Prev Airway: None documented.    LDA:        Peripheral IV - Single Lumen 05/06/25 0820 20 G 1 in No Anterior;Left;Proximal Forearm (Active)   Site Assessment Dry;Intact;No redness;No swelling 05/06/25 0832   Line Securement Device Secured with sutureless device 05/06/25 0832   Extremity Assessment Distal to IV No abnormal discoloration;No redness;No swelling;No warmth 05/06/25 0832   Line Status Blood return noted;Flushed;Saline locked 05/06/25 0832   Dressing Status Clean;Dry;Intact 05/06/25 0832   Dressing Intervention First dressing 05/06/25 0832   Dressing Change Due 05/10/25 05/06/25 0832   Site Change Due 05/10/25 05/06/25 0832   Number of days: 0            Peripheral IV - Single Lumen 05/06/25 0822 20 G 1 in No Right Antecubital (Active)   Site Assessment Dry;Intact;No redness;No swelling 05/06/25 0832   Line Securement Device Secured with sutureless device 05/06/25 0832   Extremity Assessment Distal to IV No abnormal discoloration;No redness;No swelling;No warmth 05/06/25 0832   Line Status Blood return noted;Flushed;Saline locked 05/06/25 0832   Dressing Status Clean;Dry;Intact 05/06/25 0832   Dressing Intervention First dressing 05/06/25 0832   Dressing Change Due 05/10/25 05/06/25 0832   Site Change Due 05/10/25 05/06/25 0832   Number of days: 0       Drips: None documented.      Problem List[1]    Review of patient's allergies indicates:    Allergen Reactions    No known drug allergies        Current Inpatient Medications:       Medications Ordered Prior to Encounter[2]    Past Surgical History:   Procedure Laterality Date    ABSCESS DRAINAGE      perirectal    COLONOSCOPY      ENDOSCOPIC ULTRASOUND OF UPPER GASTROINTESTINAL TRACT N/A 6/11/2024    Procedure: ULTRASOUND, UPPER GI TRACT, ENDOSCOPIC;  Surgeon: Mode Wood MD;  Location: [facility] ENDO;  Service: Endoscopy;  Laterality: N/A;  6/7 portal-EUS in 7-10 days please, [facility] or [place]-eliquis approved  Te 6/7-tt  6/10-precall complete-MS    ENDOSCOPIC ULTRASOUND OF UPPER GASTROINTESTINAL TRACT N/A 2/7/2025    Procedure: ULTRASOUND, UPPER GI TRACT, ENDOSCOPIC;  Surgeon: Mode Wood MD;  Location: [facility] (2ND FLR);  Service: Endoscopy;  Laterality: N/A;  1/11 kiana-eliquis approved TE 1/11 Dr. Tolentino-Needs an eGD and Eus with me.suhas-tt  1/31 - attempted precall LVM - Bud  2/4 SWP'S DAUGHTER. PRECALL COMPLETE-RT    ESOPHAGOGASTRODUODENOSCOPY N/A 2/7/2025    Procedure: EGD (ESOPHAGOGASTRODUODENOSCOPY);  Surgeon: Mode Wood MD;  Location: [facility] (2ND FLR);  Service: Endoscopy;  Laterality: N/A;    HAND SURGERY Left     JOINT REPLACEMENT      knee replacement right knee    KIDNEY SURGERY      partial left kidney removal - [place]    PARTIAL NEPHRECTOMY Left August 2014    PERCUTANEOUS CRYOTHERAPY OF PERIPHERAL NERVE USING LIQUID NITROUS OXIDE IN CLOSED NEEDLE DEVICE Right 6/17/2019    Procedure: CRYOTHERAPY, NERVE, PERIPHERAL, PERCUTANEOUS, USING LIQUID NITROUS OXIDE IN CLOSED NEEDLE DEVICE-right knee iovera;  Surgeon: Donny Hair III, MD;  Location: [facility] CATH LAB;  Service: Pain Management;  Laterality: Right;    TOTAL KNEE ARTHROPLASTY Right 6/27/2019    Procedure: ARTHROPLASTY, KNEE, TOTAL-SAME DAY;  Surgeon: Mikael Huerta MD;  [facility];  Service: Orthopedics;  Laterality: Right;       Social History:  Tobacco Use: Low Risk  (5/6/2025)     Patient History     Smoking Tobacco Use: Never     Smokeless Tobacco Use: Never     Passive Exposure: Not on file   Recent Concern: Tobacco Use - Medium Risk (3/13/2025)    Received from [facility]    Patient History     Smoking Tobacco Use: Former     Smokeless Tobacco Use: Former     Passive Exposure: Not on file       Alcohol Use: Not At Risk (8/6/2024)    AUDIT-C     Frequency of Alcohol Consumption: 2-4 times a month     Average Number of Drinks: 3 or 4     Frequency of Binge Drinking: Never   Recent Concern: Alcohol Use - Alcohol Misuse (8/5/2024)    AUDIT-C     Frequency of Alcohol Consumption: 4 or more times a week     Average Number of Drinks: 5 or 6     Frequency of Binge Drinking: Weekly       Objective    Vital Signs Range:  BMI Readings from Last 1 Encounters:   05/06/25 29.86 kg/m²       Temp:  [37.1 °C (98.8 °F)]   Pulse:  [57]   Resp:  [19]   BP: (121-134)/(65-68)   SpO2:  [98 %]        Significant Labs:        Component Value Date/Time    WBC 8.83 05/01/2025 1442    HGB 13.3 (L) 05/01/2025 1442    HGB 14.4 12/27/2024 0926    HCT 41.2 05/01/2025 1442    HCT 43.1 12/27/2024 0926    HCT 46 05/02/2024 1712     05/01/2025 1442     12/27/2024 0926     05/01/2025 1442     02/20/2025 1509    K 4.6 05/01/2025 1442    K 4.8 02/20/2025 1509     05/01/2025 1442    CL 98 02/20/2025 1509    CO2 28 05/01/2025 1442    CO2 27 02/20/2025 1509     (H) 05/01/2025 1442    GLU 87 02/20/2025 1509    BUN 8 05/01/2025 1442    CREATININE 1.1 05/01/2025 1442    MG 1.5 (L) 12/27/2024 0926    PHOS 3.9 11/20/2024 0427    CALCIUM 9.5 05/01/2025 1442    CALCIUM 10.2 02/20/2025 1509    ALBUMIN 4.1 12/27/2024 0926    PROT 7.4 12/27/2024 0926    ALKPHOS 81 12/27/2024 0926    BILITOT 0.7 12/27/2024 0926    AST 23 12/27/2024 0926    ALT 15 12/27/2024 0926    INR 1.0 05/01/2025 1442    INR 1.1 09/20/2024 1117    HGBA1C 6.6  (H) 12/27/2024 0926        Please see Results Review for additional labs.     Diagnostic Studies: All relevant studies, reviewed.      EKG:   Results for orders placed or performed during the hospital encounter of 11/14/24   EKG 12-lead    Collection Time: 11/18/24  5:19 AM   Result Value Ref Range    QRS Duration 88 ms    OHS QTC Calculation 465 ms    Narrative    Test Reason : Z91.89,    Vent. Rate :  71 BPM     Atrial Rate :  71 BPM     P-R Int : 160 ms          QRS Dur :  88 ms      QT Int : 428 ms       P-R-T Axes :  95 -12  51 degrees    QTcB Int : 465 ms    Normal sinus rhythm with sinus arrhythmia  Normal ECG  When compared with ECG of 14-Nov-2024 11:55,  Sinus rhythm has replaced Atrial fibrillation    Confirmed by Sarthak Gill ([id number]) on 11/18/2024 2:33:38 PM    Referred By: AAAREFERRAL SELF           Confirmed By: Sarthak Gill       ECHO:  Results for orders placed during the hospital encounter of 11/14/24    Echo    Interpretation Summary    Left Ventricle: The left ventricle is normal in size. Normal wall thickness. There is concentric remodeling. There is low normal systolic function with a visually estimated ejection fraction of 50 - 55%. There is normal diastolic function.    Right Ventricle: Normal right ventricular cavity size. Wall thickness is normal. Systolic function is normal.    Mild biatrial enlargement    Aortic Valve: The aortic valve is a trileaflet valve. There is mild aortic valve sclerosis.    Pulmonary Artery: The estimated pulmonary artery systolic pressure is 25 mmHg.    IVC/SVC: Normal venous pressure at 3 mmHg.          Pre-op Assessment    I have reviewed the Patient Summary Reports.     I have reviewed the Nursing Notes. I have reviewed the NPO Status.   I have reviewed the Medications.     Review of Systems  Anesthesia Hx:  No problems with previous Anesthesia   History of prior surgery of interest to airway management or planning:          Denies Family Hx of  Anesthesia complications.    Denies Personal Hx of Anesthesia complications.                    Cardiovascular:  Exercise tolerance: good   Hypertension   Denies MI.        Denies Angina. CHF      Denies REY.           Congestive Heart Failure (CHF)                Hypertension     Atrial Fibrillation, Atrial Flutter     Pulmonary:  Pulmonary Normal   Denies COPD.  Denies Asthma.    Denies Recent URI.                 Renal/:  Chronic Renal Disease        Kidney Function/Disease             Hepatic/GI:     GERD, well controlled Liver Disease,        Gerd       Liver Disease        Musculoskeletal:  Arthritis        Arthritis          Neurological:    Neuromuscular Disease,           Arthritis                         Neuromuscular Disease   Endocrine:  Diabetes    Diabetes                      Psych:  Psychiatric History   H/o alcohol abuse, sober x 2 months               Physical Exam  General: Alert, Oriented, Well nourished and Cooperative    Airway:  Mallampati: II   Mouth Opening: Normal  TM Distance: Normal  Neck ROM: Normal ROM    Dental:  Intact        Anesthesia Plan  Type of Anesthesia, risks & benefits discussed:    Anesthesia Type: Gen ETT  Intra-op Monitoring Plan: Standard ASA Monitors and Art Line  Post Op Pain Control Plan: multimodal analgesia and IV/PO Opioids PRN  Induction:  IV  Airway Plan: Video, Post-Induction  Informed Consent: Informed consent signed with the Patient and all parties understand the risks and agree with anesthesia plan.  All questions answered.   ASA Score: 3  Day of Surgery Review of History & Physical: H&P Update referred to the surgeon/provider.    Ready For Surgery From Anesthesia Perspective.     .           [1]   Patient Active Problem List  Diagnosis    Hyperlipidemia associated with type 2 diabetes mellitus    Idiopathic chronic gout of multiple sites without tophus    Paroxysmal atrial fibrillation    Alcohol withdrawal syndrome with complication    Overweight (BMI  25.0-29.9)    Metabolic dysfunction-associated steatotic liver disease (MASLD)    Hemorrhoids    Personal history of kidney cancer    Primary osteoarthritis of right knee    Hyponatremia    Alcohol use disorder, severe, in controlled environment    Atrial flutter    GERD (gastroesophageal reflux disease)    Alcohol use disorder, severe, dependence    Chronic pain of right knee    Type 2 diabetes mellitus without complication, without long-term current use of insulin    Aortic atherosclerosis    Decreased right shoulder range of motion    Primary osteoarthritis involving multiple joints    Generalized anxiety disorder    Chronic diastolic congestive heart failure    Metabolic acidosis    Hypomagnesemia    Recurrent major depressive disorder, in partial remission    Bronchitis    Testicular pain, right    Benign prostatic hyperplasia with nocturia    Traumatic closed displaced fracture of right shoulder with anterior dislocation    Acute encephalopathy    Pelvic floor dysfunction    Shoulder injury    Acute pain of right shoulder    Stiffness in joint    Weakness    Subdural hematoma    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     Current Outpatient Medications on File Prior to Encounter   Medication Sig Dispense Refill    allopurinoL (ZYLOPRIM) 100 MG tablet Take 2 tablets (200 mg total) by mouth once daily. 180 tablet 0    apixaban (ELIQUIS) 5 mg Tab Take 1 tablet (5 mg total) by mouth 2 (two) times daily. 180 tablet 3    diclofenac sodium (VOLTAREN) 1 % Gel Apply 2 g topically 3 (three)  times daily as needed (Right knee - hand pain).      dronedarone (MULTAQ) 400 mg Tab Take 1 tablet (400 mg total) by mouth once daily. 30 tablet 11    metFORMIN (GLUCOPHAGE-XR) 500 MG ER 24hr tablet Take 2 tablets (1,000 mg total) by mouth 2 (two) times daily with meals. (Patient taking differently: Take 1,000 mg by mouth 2 (two) times a day.) 360 tablet 3    metoprolol tartrate (LOPRESSOR) 25 MG tablet Take 25 mg by mouth 2 (two) times daily.      rosuvastatin (CRESTOR) 20 MG tablet TAKE 1 TABLET(20 MG) BY MOUTH EVERY DAY 90 tablet 3    sertraline (ZOLOFT) 100 MG tablet Take 1 tablet (100 mg total) by mouth once daily. 90 tablet 3    cyanocobalamin (VITAMIN B-12) 1000 MCG tablet Take 1 tablet (1,000 mcg total) by mouth once daily. (Patient not taking: Reported on 2/18/2025)      folic acid (FOLVITE) 1 MG tablet Take 1 tablet (1 mg total) by mouth once daily. (Patient not taking: Reported on 2/18/2025) 30 tablet 11    methylPREDNISolone (MEDROL DOSEPACK) 4 mg tablet use as directed 21 tablet 0    rOPINIRole (REQUIP) 1 MG tablet TAKE 1 TABLET(1 MG) BY MOUTH EVERY EVENING (Patient taking differently: as needed.) 90 tablet 3    thiamine (VITAMIN B-1) 100 MG tablet Take 1 tablet (100 mg total) by mouth once daily. (Patient not taking: Reported on 2/18/2025) 30 tablet 2

## 2025-05-06 NOTE — NURSING TRANSFER
Nursing Transfer Note      5/6/2025   11:14 AM    Nurse giving handoff: MARLEEN Thornton PACU   Nurse receiving handoff:MARLEEN Luna SSCU    Reason patient is being transferred: post anesthesia    Transfer From: PACU to SSCU 5    Transfer via stretcher    Transfer with cardiac monitoring    Transported by RN    Patient belongings transferred with patient: No    Chart send with patient: Yes    Notified: daughter    Patient reassessed at: 1030

## 2025-05-06 NOTE — TRANSFER OF CARE
"Anesthesia Transfer of Care Note    Patient: Donald Warren Abadie    Procedure(s) Performed: Procedure(s) (LRB):  Transesophageal echo (JOHN) intra-procedure log documentation (N/A)    Patient location: PACU    Anesthesia Type: general    Transport from OR: Transported from OR on 6-10 L/min O2 by face mask with adequate spontaneous ventilation    Post pain: adequate analgesia    Post assessment: no apparent anesthetic complications and tolerated procedure well    Post vital signs: stable    Level of consciousness: awake and alert    Nausea/Vomiting: no nausea/vomiting    Complications: none    Transfer of care protocol was followed      Last vitals: Visit Vitals  /81   Pulse 70   Temp 37.1 °C (98.8 °F) (Temporal)   Resp 16   Ht 5' 6" (1.676 m)   Wt 83.9 kg (185 lb)   SpO2 100%   BMI 29.86 kg/m²     "

## 2025-05-06 NOTE — DISCHARGE INSTRUCTIONS
Follow up:  Dr. Knox's RN Savanna Trujillo will follow up for an appointment.     Medications:  STOP metoprolol. STOP eliquis.   Start Xarelto and resume Multaq tonight.     Limitations:  You will be a fall risk for 24-48 hours. No Quick position changes. No driving for 24 hours or operating heavy machinery.     Go to the Emergency Department if you develop:   Chills or fever greater than 101.0 F   Acute Weakness or numbness   Visual, gait or speech disturbances   New chest pain, palpitations, shortness of breath, fainting

## 2025-05-06 NOTE — ANESTHESIA PROCEDURE NOTES
Intubation    Date/Time: 5/6/2025 9:27 AM    Performed by: Cheryl Bateman CRNA  Authorized by: Sarthak Noland MD    Intubation:     Induction:  Intravenous    Intubated:  Postinduction    Mask Ventilation:  2 handed mask ventilation with 2 providers    Attempts:  1    Attempted By:  CRNA    Method of Intubation:  Video laryngoscopy    Blade:  Lehman 3    Laryngeal View Grade: Grade I - full view of cords      Difficult Airway Encountered?: No      Complications:  None    Airway Device:  Oral endotracheal tube    Airway Device Size:  7.5    Style/Cuff Inflation:  Cuffed (inflated to minimal occlusive pressure)    Inflation Amount (mL):  10    Tube secured:  22    Secured at:  The lips    Placement Verified By:  Colorimetric ETCO2 device    Complicating Factors:  None    Findings Post-Intubation:  Atraumatic/condition of teeth unchanged and BS equal bilateral

## 2025-05-06 NOTE — HOSPITAL COURSE
Patient underwent RF-PVI + CTI (see procedure note for details), tolerated procedure well with no acute complications. Admitted to PACU, post-procedure ECG showed *** at *** bpm ND *** ms QRS *** ms QT/QTc *** ms. Bilateral groin sites with sutures removed, dressings C/D/I. No bleeding, hematoma, or bruit noted. Bedrest completed x 4 hours. He was monitored on telemetry, no acute arrhythmias.     Patient ambulating, tolerating PO intake, and voiding with no issues. Denies any chest pain or SOB. Discharge plans discussed with Dr. Angel. Patient to continue all home medications including AAD *** and Eliquis for CVA prophylaxis. Start pantoprazole 40 mg daily x 30 days. Follow up with Dr. Angel in 3 months.  He was assessed at bedside prior to discharge. He reported feeling well and denied chest discomfort, shortness of breath, palpitations, lightheadedness, or any other acute symptoms. Discharge plans/instructions discussed with patient and his daughter who verbalized understanding and agreement of plans of care. No further questions or concerns voiced at this time. He was discharged home in stable condition.     Physical Exam:   Gen: No acute distress, pleasant patient answering questions appropriately  CVS: Regular rate and rhythm  CHEST: Breathing even and unlabored  ABD: Soft, non-tender, nondistended  GROINS:Bilateral groin sites soft, non tender, no bleeding, no swelling, no hematoma. Dressings to bilateral groins C/D/I  Neurologic: Normal strength and tone. No focal numbness or weakness.   Psychiatric: Normal mood and affect. Behavior is normal. Alert and oriented X 3.  EXT: No Edema

## 2025-05-06 NOTE — PLAN OF CARE
EP Plan of Care    Patient presented for PVI/CTI today. JOHN done prior due to concerns of missed doses of eliquis.     JOHN with thrombus in BRYON.    Ablation canceled.    Will discuss xarelto 15mg BID (DVT/PE dosing) and rescheduling JOHN with PVI/CTI.    Jamel Crain MD  Cardiac Electrophysiology PGY-VI  [facility]

## 2025-05-06 NOTE — DISCHARGE SUMMARY
Electrophysiology  Discharge Summary      Admit Date: 5/6/2025  Discharge Date:  5/6/2025  Attending Physician: Trae Angel MD  Discharge Physician: Jamel Crain MD    Principal Diagnoses: Paroxysmal atrial fibrillation  Indication for Admission: Transesophageal echo (JOHN) intra-procedure log documentation (N/A)    Discharged Condition: Good    Hospital Course:   Patient presented for PVI/CTI but JOHN showed thrombus in BRYON. Ablation canceled. Will start xarelto 15mg BID with plans for rescheduled JOHN with PVI/CTI. Plan for LAAO at time of ablation if BRYON clear.    EP medications at discharge:  Antiarrhythmics and/or AVN agents:  Stop lopressor  Restart multaq 400mg BID  Stop eliquis  Start xarelto 15mg BID dosing      Diet: Cardiac diet  Disposition: Home or Self Care  Follow Up:   Follow-up Information       Trae Angel MD Follow up in 3 month(s).    Specialties: Electrophysiology, Cardiology  Why: routine follow up  Contact information:  [address] [place] [address] [id number] [phone]                           Discharge Medications:      Medication List        START taking these medications      rivaroxaban 15 mg Tab  Commonly known as: XARELTO  Take 1 tablet (15 mg total) by mouth 2 (two) times daily with meals.            CHANGE how you take these medications      metFORMIN 500 MG ER 24hr tablet  Commonly known as: GLUCOPHAGE-XR  Take 2 tablets (1,000 mg total) by mouth 2 (two) times daily with meals.  What changed: when to take this     rOPINIRole 1 MG tablet  Commonly known as: REQUIP  TAKE 1 TABLET(1 MG) BY MOUTH EVERY EVENING  What changed:   how much to take  how to take this  when to take this  reasons to take this            CONTINUE taking these medications      allopurinoL 100 MG tablet  Commonly known as: ZYLOPRIM  Take 2 tablets (200 mg total) by mouth once daily.     diclofenac sodium 1 % Gel  Commonly known as: VOLTAREN  Apply 2 g topically 3 (three) times daily as needed (Right  knee - hand pain).     MULTAQ 400 mg Tab  Generic drug: dronedarone  Take 1 tablet (400 mg total) by mouth once daily.     pantoprazole 40 MG tablet  Commonly known as: PROTONIX  TAKE 1 TABLET(40 MG) BY MOUTH EVERY DAY     rosuvastatin 20 MG tablet  Commonly known as: CRESTOR  TAKE 1 TABLET(20 MG) BY MOUTH EVERY DAY     sertraline 100 MG tablet  Commonly known as: ZOLOFT  Take 1 tablet (100 mg total) by mouth once daily.            STOP taking these medications      apixaban 5 mg Tab  Commonly known as: ELIQUIS     metoprolol tartrate 25 MG tablet  Commonly known as: LOPRESSOR            ASK your doctor about these medications      cyanocobalamin 1000 MCG tablet  Commonly known as: VITAMIN B-12  Take 1 tablet (1,000 mcg total) by mouth once daily.     folic acid 1 MG tablet  Commonly known as: FOLVITE  Take 1 tablet (1 mg total) by mouth once daily.     methylPREDNISolone 4 mg tablet  Commonly known as: MEDROL DOSEPACK  use as directed     thiamine 100 MG tablet  Commonly known as: VITAMIN B-1  Take 1 tablet (100 mg total) by mouth once daily.               Where to Get Your Medications        These medications were sent to [facility]  [address] [address]      Hours: Always Open Phone: [phone]   rivaroxaban 15 mg Tab         Jamel Crain MD  Cardiovascular Disease PGY-VI  [facility]

## 2025-05-06 NOTE — SUBJECTIVE & OBJECTIVE
"Past Medical History:   Diagnosis Date    A-fib     Alcohol abuse     Anxiety     Arthritis     Chronic gout     Diabetes mellitus     DM (diabetes mellitus) 11/16/2016    "boarderline, not taking any meds currently"    Fatty liver     Hyperlipidemia     Renal cell cancer 2014    S/P TKR (total knee replacement), right 06/27/2019       Past Surgical History:   Procedure Laterality Date    ABSCESS DRAINAGE      perirectal    COLONOSCOPY      ENDOSCOPIC ULTRASOUND OF UPPER GASTROINTESTINAL TRACT N/A 6/11/2024    Procedure: ULTRASOUND, UPPER GI TRACT, ENDOSCOPIC;  Surgeon: Mode Wood MD;  Location: [facility];  Service: Endoscopy;  Laterality: N/A;  6/7 portal-EUS in 7-10 days please, [facility] or Nicole-eliquis approved  Te 6/7-tt  6/10-precall complete-MS    ENDOSCOPIC ULTRASOUND OF UPPER GASTROINTESTINAL TRACT N/A 2/7/2025    Procedure: ULTRASOUND, UPPER GI TRACT, ENDOSCOPIC;  Surgeon: Mode Wood MD;  Location: [facility] (2ND FLR);  Service: Endoscopy;  Laterality: N/A;  1/11 portal-eliquis approved TE 1/11 Dr. Tolentino-Needs an eGD and Eus with me.suhas-tt  1/31 - attempted precall LVM - Bud  2/4 SWP'S DAUGHTER. PRECALL COMPLETE-RT    ESOPHAGOGASTRODUODENOSCOPY N/A 2/7/2025    Procedure: EGD (ESOPHAGOGASTRODUODENOSCOPY);  Surgeon: Mode Wood MD;  Location: [facility] ([address] [place] [address]);  Service: Endoscopy;  Laterality: N/A;    HAND SURGERY Left     JOINT REPLACEMENT      knee replacement right knee    KIDNEY SURGERY      partial left kidney removal - [place]    PARTIAL NEPHRECTOMY Left August 2014    PERCUTANEOUS CRYOTHERAPY OF PERIPHERAL NERVE USING LIQUID NITROUS OXIDE IN CLOSED NEEDLE DEVICE Right 6/17/2019    Procedure: CRYOTHERAPY, NERVE, PERIPHERAL, PERCUTANEOUS, USING LIQUID NITROUS OXIDE IN CLOSED NEEDLE DEVICE-right knee iovera;  Surgeon: Donny Hair III, MD;  Location: [facility] CATH LAB;  Service: Pain Management;  Laterality: Right;    TOTAL KNEE ARTHROPLASTY Right 6/27/2019    Procedure: " ARTHROPLASTY, KNEE, TOTAL-SAME DAY;  Surgeon: Mikael Huerta MD;  Location: [facility] OR [address] [place] [address];  Service: Orthopedics;  Laterality: Right;       Review of patient's allergies indicates:   Allergen Reactions    No known drug allergies        No current facility-administered medications on file prior to encounter.     Current Outpatient Medications on File Prior to Encounter   Medication Sig    allopurinoL (ZYLOPRIM) 100 MG tablet Take 2 tablets (200 mg total) by mouth once daily.    apixaban (ELIQUIS) 5 mg Tab Take 1 tablet (5 mg total) by mouth 2 (two) times daily.    diclofenac sodium (VOLTAREN) 1 % Gel Apply 2 g topically 3 (three) times daily as needed (Right knee - hand pain).    dronedarone (MULTAQ) 400 mg Tab Take 1 tablet (400 mg total) by mouth once daily.    metFORMIN (GLUCOPHAGE-XR) 500 MG ER 24hr tablet Take 2 tablets (1,000 mg total) by mouth 2 (two) times daily with meals. (Patient taking differently: Take 1,000 mg by mouth 2 (two) times a day.)    metoprolol tartrate (LOPRESSOR) 25 MG tablet Take 25 mg by mouth 2 (two) times daily.    rosuvastatin (CRESTOR) 20 MG tablet TAKE 1 TABLET(20 MG) BY MOUTH EVERY DAY    sertraline (ZOLOFT) 100 MG tablet Take 1 tablet (100 mg total) by mouth once daily.    cyanocobalamin (VITAMIN B-12) 1000 MCG tablet Take 1 tablet (1,000 mcg total) by mouth once daily. (Patient not taking: Reported on 2/18/2025)    folic acid (FOLVITE) 1 MG tablet Take 1 tablet (1 mg total) by mouth once daily. (Patient not taking: Reported on 2/18/2025)    methylPREDNISolone (MEDROL DOSEPACK) 4 mg tablet use as directed    rOPINIRole (REQUIP) 1 MG tablet TAKE 1 TABLET(1 MG) BY MOUTH EVERY EVENING (Patient taking differently: as needed.)    thiamine (VITAMIN B-1) 100 MG tablet Take 1 tablet (100 mg total) by mouth once daily. (Patient not taking: Reported on 2/18/2025)     Family History       Problem Relation (Age of Onset)    Alcohol abuse Brother    Alzheimer's disease Mother, Brother     Cancer Father, Sister    Colon cancer Father    Colon polyps Brother    Diabetes Mother    Lung cancer Father, Sister          Tobacco Use    Smoking status: Never    Smokeless tobacco: Never   Substance and Sexual Activity    Alcohol use: Not Currently     Alcohol/week: 168.0 standard drinks of alcohol     Types: 168 Cans of beer per week     Comment: no ETOH x 7 months    Drug use: Not Currently     Types: Marijuana    Sexual activity: Not Currently     Partners: Female     Review of Systems   Constitutional: Negative. Negative for chills, fever and malaise/fatigue.   HENT:  Negative for nosebleeds.    Cardiovascular:  Positive for palpitations (when AF occurs). Negative for chest pain, dyspnea on exertion, leg swelling and syncope.   Respiratory:  Negative for shortness of breath.    Hematologic/Lymphatic: Negative.    Skin:  Negative for itching and rash.        Healing wound noted to lateral left fourth toe   Musculoskeletal: Negative.    Gastrointestinal:  Negative for abdominal pain and nausea.   Genitourinary:  Negative for hematuria.   Neurological:  Negative for dizziness and headaches.   Psychiatric/Behavioral: Negative.     All other systems reviewed and are negative.    Objective:     Vital Signs (Most Recent):  Temp: 98.8 °F (37.1 °C) (05/06/25 0730)  Pulse: (!) 57 (05/06/25 0730)  Resp: 19 (05/06/25 0730)  BP: 134/65 (05/06/25 0732)  SpO2: 98 % (05/06/25 0730) Vital Signs (24h Range):  Temp:  [98.8 °F (37.1 °C)] 98.8 °F (37.1 °C)  Pulse:  [57] 57  Resp:  [19] 19  SpO2:  [98 %] 98 %  BP: (121-134)/(65-68) 134/65     Weight: 83.9 kg (185 lb)  Body mass index is 29.86 kg/m².    SpO2: 98 %       No intake or output data in the 24 hours ending 05/06/25 0748    Lines/Drains/Airways       None                    Physical Exam  Vitals reviewed.   HENT:      Head: Normocephalic.      Mouth/Throat:      Mouth: Mucous membranes are moist.   Eyes:      Extraocular Movements: Extraocular movements intact.    Cardiovascular:      Rate and Rhythm: Regular rhythm. Bradycardia present.   Pulmonary:      Effort: Pulmonary effort is normal. No respiratory distress.      Breath sounds: No wheezing or rales.   Abdominal:      General: There is no distension.      Palpations: Abdomen is soft.   Musculoskeletal:         General: Normal range of motion.      Cervical back: Normal range of motion.   Skin:     General: Skin is warm.      Comments: Healing wound noted to lateral left fourth toe. Skin irritation noted to bilateral groins   Neurological:      General: No focal deficit present.      Mental Status: He is alert.   Psychiatric:         Mood and Affect: Mood normal.         Behavior: Behavior normal.         Thought Content: Thought content normal.         Judgment: Judgment normal.          Significant Labs: Pertinent pre-procedure labs reviewed    Significant Imaging: ECG

## 2025-05-06 NOTE — PLAN OF CARE
Patient arrived to room. PIV placed, x2. Admit assessment completed. Plan of care discussed with patient. Will monitor. Call light within reach, instructed in use.   POC

## 2025-05-06 NOTE — ANESTHESIA POSTPROCEDURE EVALUATION
Anesthesia Post Evaluation    Patient: Donald Warren Abadie    Procedure(s) Performed: Procedure(s) (LRB):  Transesophageal echo (JOHN) intra-procedure log documentation (N/A)    Final Anesthesia Type: general      Patient location during evaluation: PACU  Patient participation: Yes- Able to Participate  Level of consciousness: awake and alert and oriented  Post-procedure vital signs: reviewed and stable  Pain management: adequate  Airway patency: patent  NANCY mitigation strategies: Intraoperative administration of CPAP, nasopharyngeal airway, or oral appliance during sedation, Multimodal analgesia, Extubation while patient is awake, Verification of full reversal of neuromuscular block and Extubation and recovery carried out in lateral, semiupright, or other nonsupine position  PONV status at discharge: No PONV  Anesthetic complications: no      Cardiovascular status: hemodynamically stable  Respiratory status: unassisted  Hydration status: euvolemic  Follow-up not needed.              Vitals Value Taken Time   /64 05/06/25 11:40   Temp 36.4 °C (97.6 °F) 05/06/25 11:40   Pulse 57 05/06/25 11:40   Resp 14 05/06/25 11:40   SpO2 96 % 05/06/25 11:40         No case tracking events are documented in the log.      Pain/French Score: French Score: 10 (5/6/2025 10:45 AM)

## 2025-05-06 NOTE — H&P
[facility] - Short Stay Cardiac Unit  Cardiology  History and Physical     Patient Name: Donald Warren Abadie  MRN: [id number]  Admission Date: 5/6/2025  Code Status: Prior   Attending Provider: Trae Angel MD   Primary Care Physician: Bacilio Larry MD  Principal Problem:Paroxysmal atrial fibrillation    Patient information was obtained from patient, relative(s), and past medical records.     Subjective:     Chief Complaint:  Atrial fibrillation/atrial flutter     HPI:  Donald Warren Abadie is a 69 y.o. male with AF/AFL, ETOH dependence, low normal EF (50%), small SDH after fall, small remote left PCA territory infarct noted on MRI brain on 9/7/2024 - pt unaware of any symptoms associated and does not ever recall having a CVA    History obtained from previous visits as well as through patient report.    Background:     From last office visit with Caroline Barillas NP on 2/18/2025.     Mr. Abadie has a hx of paroxysmal atrial fibrillation/flutter, alcohol dependence, and mildly reduce LVEF (45-50% in 2022) who follows up for AF. He was previously taking prn flecainide with good control of his symptoms however he presented in 10/2023 with AF with RVR in setting of alcohol relapse. He spontaneously converted. Plan was to start multaq 3 days later and follow-up in clinic. PVI had been offered in the past. Multaq never filled due to cost. Recently has had ER visits for symptomatic pAF with RVR. Amiodarone started.     4/30/2024: In-clinic ECG is sinus rhythm with a narrow QRS  In summary, Mr. Abadie is a 68 year-old man, former patient of Dr. Giang, with paroxysmal atrial fibrillation/flutter, alcohol dependence, and mildly reduce LVEF (45-50% in 2022) who follows up for AF. Discussed PVI as an alternative to drug therapy. Dr. Angel spent about a half hour discussing the nature of PVI including transseptal puncture. We discussed risks and benefits at length. Our discussion included, but was not limited to the risk of  death, infection, bleeding, stroke, MI, cardiac perforation, embolism, cardiac tamponade, vascular injury, valvular injury, AE fistula, injury to phrenic nerve, pulmonary vein stenosis and other organic injury including the possibility for need for surgery or pacemaker implantation.  Discussed long-term issues with amiodarone. Sotalol would be an alternative choice at some point. He wants to think about the ablation.     8/5/2024: Admitted to  s/p fall in the setting of alcohol intoxication.      9/2024: SDH from fall (Patient was going over a bump on his way to a clinic appointment and the chair he was then folded up, resulting in a fall.) Contacted by radiology with initial CT head read-thin subdural bleeding noted, no mass effect. Case discussed with Neurosurgery.  Neurosurgery recommending SBP less than 160, no need for reversal of Eliquis, repeat CT head in 6 hours.     11/19/2024: The patient was admitted to hospital medicine for further management of his acute alcohol withdrawals.      11/28/2024: The patient was admitted to hospital medicine for further management of his acute alcohol withdrawals      2/17/2025: Pt's daughter called and says when he takes Multaq 400 mg BID, his HR stays in the 50, 90 SBP, and he feels lightheaded the next morning. He's been taking Multaq once daily for the past 3 weeks, but he's been having a fib more and HR 110s. She stated pt no longer drinking. She feels his Multaq needs to be adjusted.      2/18/2025: EKG shows sinus rhythm at 61bpm. HI interval is 176. QRS is 86. QTc is 420.  Since last clinic visit 4/2024, pt had multiple falls due to ETOH. In Sept one fall resulted in small SDH. He is now off alcohol (mult ED visits for ETOH withdrawal in Nov). On multaq but complains of bradycardia on full dose and increased AF on partial dose. His AF is very symptomatic. He is interested in ablation to get off AAD. We discussed risks and benefits at length. Our discussion  included, but was not limited to the risk of death, infection, bleeding, stroke, MI, cardiac perforation, embolism, cardiac tamponade, vascular injury, valvular injury, AE fistula, injury to phrenic nerve, pulmonary vein stenosis and other organic injury including the possibility for need for surgery. Pt and daughter verbalized understanding. Per daughter, pt has a hx of both AF and AFL.  On eliquis for CVA prophylaxis. Confirm PVI and RFA of CTI with Dr. Angel    Interval Hx:     Donald Warren Abadie presents today to [facility] for scheduled RFA PVI + CTI with Dr. Angel. He denies any chest pain, palpitations, SOB, REY, dizziness, light headedness, weakness, syncope, or near syncopal episodes. He denies any bleeding, infections, fevers, rashes, or surgeries in the past 30 days. He is currently taking Eliquis. Last dose of Eliquis was on 5/5/2025 AM. He has not been compliant with his Eliquis over the last 30 days and have missed a few evening doses. Daughter also reports previous infarct noted on MRI brain though patient never had symptoms of CVA and they do not know when it occurred.     ECG today shows sinus bradycardia at 55 bpm  ms QRS 88 ms QT/Qtc 448/428 ms.    Pertinent labs reviewed from 5/1/2025. INR 1.0, Hgb 13.3, Cr 1.1    Echo  Result Date: 11/15/2024    Left Ventricle: The left ventricle is normal in size. Normal wall thickness. There is concentric remodeling. There is low normal systolic function with a visually estimated ejection fraction of 50 - 55%. There is normal diastolic function.    Right Ventricle: Normal right ventricular cavity size. Wall thickness is normal. Systolic function is normal.    Mild biatrial enlargement    Aortic Valve: The aortic valve is a trileaflet valve. There is mild aortic valve sclerosis.    Pulmonary Artery: The estimated pulmonary artery systolic pressure is 25 mmHg.    IVC/SVC: Normal venous pressure at 3 mmHg.    Past Medical History:   Diagnosis Date    A-fib   "   Alcohol abuse     Anxiety     Arthritis     Chronic gout     Diabetes mellitus     DM (diabetes mellitus) 11/16/2016    "boarderline, not taking any meds currently"    Fatty liver     Hyperlipidemia     Renal cell cancer 2014    S/P TKR (total knee replacement), right 06/27/2019       Past Surgical History:   Procedure Laterality Date    ABSCESS DRAINAGE      perirectal    COLONOSCOPY      ENDOSCOPIC ULTRASOUND OF UPPER GASTROINTESTINAL TRACT N/A 6/11/2024    Procedure: ULTRASOUND, UPPER GI TRACT, ENDOSCOPIC;  Surgeon: Mode Wood MD;  Location: [facility];  Service: Endoscopy;  Laterality: N/A;  6/7 portal-EUS in 7-10 days please, [facility] or [place]-eliquis approved  Te 6/7-tt  6/10-precall complete-MS    ENDOSCOPIC ULTRASOUND OF UPPER GASTROINTESTINAL TRACT N/A 2/7/2025    Procedure: ULTRASOUND, UPPER GI TRACT, ENDOSCOPIC;  Surgeon: Mode Wood MD;  Location: [facility] (2ND FLR);  Service: Endoscopy;  Laterality: N/A;  1/11 [place]-eliquis approved TE 1/11 Dr. Tolentino-Needs an eGD and Eus with me.suhas-tt  1/31 - attempted precall LVM - Bud  2/4 SWP'S DAUGHTER. PRECALL COMPLETE-RT    ESOPHAGOGASTRODUODENOSCOPY N/A 2/7/2025    Procedure: EGD (ESOPHAGOGASTRODUODENOSCOPY);  Surgeon: Mode Wood MD;  Location: [facility] (2ND FLR);  Service: Endoscopy;  Laterality: N/A;    HAND SURGERY Left     JOINT REPLACEMENT      knee replacement right knee    KIDNEY SURGERY      partial left kidney removal - [place]    PARTIAL NEPHRECTOMY Left August 2014    PERCUTANEOUS CRYOTHERAPY OF PERIPHERAL NERVE USING LIQUID NITROUS OXIDE IN CLOSED NEEDLE DEVICE Right 6/17/2019    Procedure: CRYOTHERAPY, NERVE, PERIPHERAL, PERCUTANEOUS, USING LIQUID NITROUS OXIDE IN CLOSED NEEDLE DEVICE-right knee iovera;  Surgeon: Donny Hair III, MD;  Location: [facility] CATH LAB;  Service: Pain Management;  Laterality: Right;    TOTAL KNEE ARTHROPLASTY Right 6/27/2019    Procedure: ARTHROPLASTY, KNEE, TOTAL-SAME DAY;  Surgeon: Mikael" FIDENCIO Huerta MD;  Location: [facility] OR [address] [place] [address];  Service: Orthopedics;  Laterality: Right;       Review of patient's allergies indicates:   Allergen Reactions    No known drug allergies        No current facility-administered medications on file prior to encounter.     Current Outpatient Medications on File Prior to Encounter   Medication Sig    allopurinoL (ZYLOPRIM) 100 MG tablet Take 2 tablets (200 mg total) by mouth once daily.    apixaban (ELIQUIS) 5 mg Tab Take 1 tablet (5 mg total) by mouth 2 (two) times daily.    diclofenac sodium (VOLTAREN) 1 % Gel Apply 2 g topically 3 (three) times daily as needed (Right knee - hand pain).    dronedarone (MULTAQ) 400 mg Tab Take 1 tablet (400 mg total) by mouth once daily.    metFORMIN (GLUCOPHAGE-XR) 500 MG ER 24hr tablet Take 2 tablets (1,000 mg total) by mouth 2 (two) times daily with meals. (Patient taking differently: Take 1,000 mg by mouth 2 (two) times a day.)    metoprolol tartrate (LOPRESSOR) 25 MG tablet Take 25 mg by mouth 2 (two) times daily.    rosuvastatin (CRESTOR) 20 MG tablet TAKE 1 TABLET(20 MG) BY MOUTH EVERY DAY    sertraline (ZOLOFT) 100 MG tablet Take 1 tablet (100 mg total) by mouth once daily.    cyanocobalamin (VITAMIN B-12) 1000 MCG tablet Take 1 tablet (1,000 mcg total) by mouth once daily. (Patient not taking: Reported on 2/18/2025)    folic acid (FOLVITE) 1 MG tablet Take 1 tablet (1 mg total) by mouth once daily. (Patient not taking: Reported on 2/18/2025)    methylPREDNISolone (MEDROL DOSEPACK) 4 mg tablet use as directed    rOPINIRole (REQUIP) 1 MG tablet TAKE 1 TABLET(1 MG) BY MOUTH EVERY EVENING (Patient taking differently: as needed.)    thiamine (VITAMIN B-1) 100 MG tablet Take 1 tablet (100 mg total) by mouth once daily. (Patient not taking: Reported on 2/18/2025)     Family History       Problem Relation (Age of Onset)    Alcohol abuse Brother    Alzheimer's disease Mother, Brother    Cancer Father, Sister    Colon cancer Father    Colon  polyps Brother    Diabetes Mother    Lung cancer Father, Sister          Tobacco Use    Smoking status: Never    Smokeless tobacco: Never   Substance and Sexual Activity    Alcohol use: Not Currently     Alcohol/week: 168.0 standard drinks of alcohol     Types: 168 Cans of beer per week     Comment: no ETOH x 7 months    Drug use: Not Currently     Types: Marijuana    Sexual activity: Not Currently     Partners: Female     Review of Systems   Constitutional: Negative. Negative for chills, fever and malaise/fatigue.   HENT:  Negative for nosebleeds.    Cardiovascular:  Positive for palpitations (when AF occurs). Negative for chest pain, dyspnea on exertion, leg swelling and syncope.   Respiratory:  Negative for shortness of breath.    Hematologic/Lymphatic: Negative.    Skin:  Negative for itching and rash.        Healing wound noted to lateral left fourth toe   Musculoskeletal: Negative.    Gastrointestinal:  Negative for abdominal pain and nausea.   Genitourinary:  Negative for hematuria.   Neurological:  Negative for dizziness and headaches.   Psychiatric/Behavioral: Negative.     All other systems reviewed and are negative.    Objective:     Vital Signs (Most Recent):  Temp: 98.8 °F (37.1 °C) (05/06/25 0730)  Pulse: (!) 57 (05/06/25 0730)  Resp: 19 (05/06/25 0730)  BP: 134/65 (05/06/25 0732)  SpO2: 98 % (05/06/25 0730) Vital Signs (24h Range):  Temp:  [98.8 °F (37.1 °C)] 98.8 °F (37.1 °C)  Pulse:  [57] 57  Resp:  [19] 19  SpO2:  [98 %] 98 %  BP: (121-134)/(65-68) 134/65     Weight: 83.9 kg (185 lb)  Body mass index is 29.86 kg/m².    SpO2: 98 %       No intake or output data in the 24 hours ending 05/06/25 0748    Lines/Drains/Airways       None                    Physical Exam  Vitals reviewed.   HENT:      Head: Normocephalic.      Mouth/Throat:      Mouth: Mucous membranes are moist.   Eyes:      Extraocular Movements: Extraocular movements intact.   Cardiovascular:      Rate and Rhythm: Regular rhythm.  Bradycardia present.   Pulmonary:      Effort: Pulmonary effort is normal. No respiratory distress.      Breath sounds: No wheezing or rales.   Abdominal:      General: There is no distension.      Palpations: Abdomen is soft.   Musculoskeletal:         General: Normal range of motion.      Cervical back: Normal range of motion.   Skin:     General: Skin is warm.      Comments: Healing wound noted to lateral left fourth toe. Skin irritation noted to bilateral groins   Neurological:      General: No focal deficit present.      Mental Status: He is alert.   Psychiatric:         Mood and Affect: Mood normal.         Behavior: Behavior normal.         Thought Content: Thought content normal.         Judgment: Judgment normal.          Significant Labs: Pertinent pre-procedure labs reviewed    Significant Imaging: ECG  Assessment and Plan:     Paroxysmal atrial fibrillation/atrial flutter     Plan:   -RFA PVI + CTI   -Anesthesia for sedation    -JOHN prior    Prior to procedure, Dr. Story at bedside speaking with patient and family. Extensive discussion with patient regarding the nature of RFA PVI + CTI including transseptal puncture. We discussed risks and benefits at length. Our discussion included, but was not limited to the risk of death, infection, bleeding, stroke, MI, cardiac perforation, embolism, cardiac tamponade, vascular injury, AE fistula, injury to phrenic nerve, pulmonary vein stenosis and other organic injury including the possibility for need for surgery or pacemaker implantation.  We discussed 25-30% chance of requiring redo-ablation. We discussed utilizing amiodarone temporarily post-procedure during the blanking period. We also discussed need for 8 weeks of uninterrupted anticoagulation post-PVI. Patient and his daughter verbalized understanding. All questions answered, no further questions or concerns, patient would like to proceed. Consents signed.    ALONDRA Stewart  Cardiology   [facility] - Short  Stay Cardiac Unit    Attending: Dr. Trae Angel

## 2025-05-06 NOTE — HPI
Donald Warren Abadie is a 69 y.o. male with AF/AFL, ETOH dependence, low normal EF (50%), small SDH after fall, small remote left PCA territory infarct noted on MRI brain on 9/7/2024 - pt unaware of any symptoms associated and does not ever recall having a CVA    History obtained from previous visits as well as through patient report.    Background:     From last office visit with Caroline Barillas NP on 2/18/2025.     Mr. Abadie has a hx of paroxysmal atrial fibrillation/flutter, alcohol dependence, and mildly reduce LVEF (45-50% in 2022) who follows up for AF. He was previously taking prn flecainide with good control of his symptoms however he presented in 10/2023 with AF with RVR in setting of alcohol relapse. He spontaneously converted. Plan was to start multaq 3 days later and follow-up in clinic. PVI had been offered in the past. Multaq never filled due to cost. Recently has had ER visits for symptomatic pAF with RVR. Amiodarone started.     4/30/2024: In-clinic ECG is sinus rhythm with a narrow QRS  In summary, Mr. Abadie is a 68 year-old man, former patient of Dr. Giang, with paroxysmal atrial fibrillation/flutter, alcohol dependence, and mildly reduce LVEF (45-50% in 2022) who follows up for AF. Discussed PVI as an alternative to drug therapy. Dr. Angel spent about a half hour discussing the nature of PVI including transseptal puncture. We discussed risks and benefits at length. Our discussion included, but was not limited to the risk of death, infection, bleeding, stroke, MI, cardiac perforation, embolism, cardiac tamponade, vascular injury, valvular injury, AE fistula, injury to phrenic nerve, pulmonary vein stenosis and other organic injury including the possibility for need for surgery or pacemaker implantation.  Discussed long-term issues with amiodarone. Sotalol would be an alternative choice at some point. He wants to think about the ablation.     8/5/2024: Admitted to  s/p fall in the setting  of alcohol intoxication.      9/2024: SDH from fall (Patient was going over a bump on his way to a clinic appointment and the chair he was then folded up, resulting in a fall.) Contacted by radiology with initial CT head read-thin subdural bleeding noted, no mass effect. Case discussed with Neurosurgery.  Neurosurgery recommending SBP less than 160, no need for reversal of Eliquis, repeat CT head in 6 hours.     11/19/2024: The patient was admitted to hospital medicine for further management of his acute alcohol withdrawals.      11/28/2024: The patient was admitted to hospital medicine for further management of his acute alcohol withdrawals      2/17/2025: Pt's daughter called and says when he takes Multaq 400 mg BID, his HR stays in the 50, 90 SBP, and he feels lightheaded the next morning. He's been taking Multaq once daily for the past 3 weeks, but he's been having a fib more and HR 110s. She stated pt no longer drinking. She feels his Multaq needs to be adjusted.      2/18/2025: EKG shows sinus rhythm at 61bpm. [place] interval is 176. QRS is 86. QTc is 420.  Since last clinic visit 4/2024, pt had multiple falls due to ETOH. In Sept one fall resulted in small SDH. He is now off alcohol (mult ED visits for ETOH withdrawal in Nov). On multaq but complains of bradycardia on full dose and increased AF on partial dose. His AF is very symptomatic. He is interested in ablation to get off AAD. We discussed risks and benefits at length. Our discussion included, but was not limited to the risk of death, infection, bleeding, stroke, MI, cardiac perforation, embolism, cardiac tamponade, vascular injury, valvular injury, AE fistula, injury to phrenic nerve, pulmonary vein stenosis and other organic injury including the possibility for need for surgery. Pt and daughter verbalized understanding. Per daughter, pt has a hx of both AF and AFL.  On eliquis for CVA prophylaxis. Confirm PVI and RFA of CTI with Dr. Angel    Interval Hx:      Donald Warren Abadie presents today to [facility] for scheduled RFA PVI + CTI with Dr. Angel. He denies any chest pain, palpitations, SOB, REY, dizziness, light headedness, weakness, syncope, or near syncopal episodes. He denies any bleeding, infections, fevers, rashes, or surgeries in the past 30 days. He is currently taking Eliquis. Last dose of Eliquis was on 5/5/2025 AM. He has not been compliant with his Eliquis over the last 30 days and have missed a few evening doses. Daughter also reports previous infarct noted on MRI brain though patient never had symptoms of CVA and they do not know when it occurred.     ECG today shows sinus bradycardia at 55 bpm  ms QRS 88 ms QT/Qtc 448/428 ms.    Pertinent labs reviewed from 5/1/2025. INR 1.0, Hgb 13.3, Cr 1.1    Echo  Result Date: 11/15/2024    Left Ventricle: The left ventricle is normal in size. Normal wall thickness. There is concentric remodeling. There is low normal systolic function with a visually estimated ejection fraction of 50 - 55%. There is normal diastolic function.    Right Ventricle: Normal right ventricular cavity size. Wall thickness is normal. Systolic function is normal.    Mild biatrial enlargement    Aortic Valve: The aortic valve is a trileaflet valve. There is mild aortic valve sclerosis.    Pulmonary Artery: The estimated pulmonary artery systolic pressure is 25 mmHg.    IVC/SVC: Normal venous pressure at 3 mmHg.      Paroxysmal atrial fibrillation/atrial flutter     Plan:   -RFA PVI + CTI   -Anesthesia for sedation    -JOHN prior    Prior to procedure, Dr. Story at bedside speaking with patient and family. Extensive discussion with patient regarding the nature of RFA PVI + CTI including transseptal puncture. We discussed risks and benefits at length. Our discussion included, but was not limited to the risk of death, infection, bleeding, stroke, MI, cardiac perforation, embolism, cardiac tamponade, vascular injury, AE fistula, injury to  phrenic nerve, pulmonary vein stenosis and other organic injury including the possibility for need for surgery or pacemaker implantation.  We discussed 25-30% chance of requiring redo-ablation. We discussed utilizing amiodarone temporarily post-procedure during the blanking period. We also discussed need for 8 weeks of uninterrupted anticoagulation post-PVI. Patient verbalized understanding. All questions answered, no further questions or concerns, patient would like to proceed. Consents signed.

## 2025-05-06 NOTE — H&P
[facility], [place]  JOHN Consult      Donald Warren Abadie  YOB: 1955  Medical Record Number:  [id number]  Attending Physician:  Trae Angel MD   Current Principal Problem:  Paroxysmal atrial fibrillation    History     HPI  Donald Warren Abadie is a 69 y.o. male with AF/AFL, ETOH dependence, low normal EF (50%), small SDH after fall.     Interval Hx:     Donald Warren Abadie presents today to [facility] for scheduled RFA PVI + CTI with Dr. Angel. He denies any chest pain, palpitations, SOB, REY, dizziness, light headedness, weakness, syncope, or near syncopal episodes. He denies any bleeding, infections, fevers, rashes, or surgeries in the past 30 days. He is currently taking Eliquis. Last dose of Eliquis was on 5/5/2025 AM. He has not been compliant with his Eliquis over the last 30 days and have missed a few evening doses. Daughter also reports previous infarct noted on MRI brain though patient never had symptoms of CVA and they do not know when it occurred.     ECG today shows sinus bradycardia at 55 bpm  ms QRS 88 ms QT/Qtc 448/428 ms.    Pertinent labs reviewed from 5/1/2025. INR 1.0, Hgb 13.3, Cr 1.1    Echo  Result Date: 11/15/2024    Left Ventricle: The left ventricle is normal in size. Normal wall thickness. There is concentric remodeling. There is low normal systolic function with a visually estimated ejection fraction of 50 - 55%. There is normal diastolic function.    Right Ventricle: Normal right ventricular cavity size. Wall thickness is normal. Systolic function is normal.    Mild biatrial enlargement    Aortic Valve: The aortic valve is a trileaflet valve. There is mild aortic valve sclerosis.    Pulmonary Artery: The estimated pulmonary artery systolic pressure is 25 mmHg.    IVC/SVC: Normal venous pressure at 3 mmHg.         [id number] 3  Anticoagulant/antiplatelets: eliquis, not compliant  ECG: sinus rhythm    Dysphagia or odynophagia:  No  Liver Disease,  esophageal disease, or known varices:  No  Upper GI Bleeding: No  Snoring:  No  Sleep Apnea:  No  Prior neck surgery or radiation:  No  History of anesthetic difficulties:  No  Family history of anesthetic difficulties:  No  Last oral intake: yesterday before midnight  Able to move neck in all directions:  Yes    Medications - Outpatient  Prior to Admission medications    Medication Sig Start Date End Date Taking? Authorizing Provider   allopurinoL (ZYLOPRIM) 100 MG tablet Take 2 tablets (200 mg total) by mouth once daily. 11/26/24  Yes Ayaan Reno MD   apixaban (ELIQUIS) 5 mg Tab Take 1 tablet (5 mg total) by mouth 2 (two) times daily. 1/13/25  Yes Huong Piña PA-C   diclofenac sodium (VOLTAREN) 1 % Gel Apply 2 g topically 3 (three) times daily as needed (Right knee - hand pain). 10/29/23  Yes Dayna Bai MD   dronedarone (MULTAQ) 400 mg Tab Take 1 tablet (400 mg total) by mouth once daily. 11/19/24 11/19/25 Yes Mehdi Ramey MD   metFORMIN (GLUCOPHAGE-XR) 500 MG ER 24hr tablet Take 2 tablets (1,000 mg total) by mouth 2 (two) times daily with meals.  Patient taking differently: Take 1,000 mg by mouth 2 (two) times a day. 12/26/24  Yes Nicole Kurtz NP-C   metoprolol tartrate (LOPRESSOR) 25 MG tablet Take 25 mg by mouth 2 (two) times daily.   Yes Provider, Historical   pantoprazole (PROTONIX) 40 MG tablet TAKE 1 TABLET(40 MG) BY MOUTH EVERY DAY 4/10/25  Yes Bacilio Larry MD   rosuvastatin (CRESTOR) 20 MG tablet TAKE 1 TABLET(20 MG) BY MOUTH EVERY DAY 8/29/24  Yes Uriel Tolentino MD   sertraline (ZOLOFT) 100 MG tablet Take 1 tablet (100 mg total) by mouth once daily. 5/23/24  Yes Bacilio Reyes Jr., MD   cyanocobalamin (VITAMIN B-12) 1000 MCG tablet Take 1 tablet (1,000 mcg total) by mouth once daily.  Patient not taking: Reported on 2/18/2025 3/30/19   DEAN Vigil MD   folic acid (FOLVITE) 1 MG tablet Take 1 tablet (1 mg total) by mouth once daily.  Patient not taking: Reported  "on 2/18/2025 11/19/24 11/19/25  Mehdi Ramey MD   methylPREDNISolone (MEDROL DOSEPACK) 4 mg tablet use as directed 1/9/25   Tomas Sanz MD   rOPINIRole (REQUIP) 1 MG tablet TAKE 1 TABLET(1 MG) BY MOUTH EVERY EVENING  Patient taking differently: as needed. 1/31/24   Bacilio Larry MD   thiamine (VITAMIN B-1) 100 MG tablet Take 1 tablet (100 mg total) by mouth once daily.  Patient not taking: Reported on 2/18/2025 11/19/24   Mehdi Ramey MD       Medications - Current  Scheduled Meds:  Continuous Infusions:    Allergies  Review of patient's allergies indicates:   Allergen Reactions    No known drug allergies      Past Medical History  Past Medical History:   Diagnosis Date    A-fib     Alcohol abuse     Anxiety     Arthritis     Chronic gout     Diabetes mellitus     DM (diabetes mellitus) 11/16/2016    "boarderline, not taking any meds currently"    Fatty liver     Hyperlipidemia     Renal cell cancer 2014    S/P TKR (total knee replacement), right 06/27/2019     Past Surgical History  Past Surgical History:   Procedure Laterality Date    ABSCESS DRAINAGE      perirectal    COLONOSCOPY      ENDOSCOPIC ULTRASOUND OF UPPER GASTROINTESTINAL TRACT N/A 6/11/2024    Procedure: ULTRASOUND, UPPER GI TRACT, ENDOSCOPIC;  Surgeon: Mode Wood MD;  Location: [facility];  Service: Endoscopy;  Laterality: N/A;  6/7 portal-[place] 7-10 days [place] [facility] [place] approved  Te 6/7-tt  6/10-precall complete-MS    ENDOSCOPIC ULTRASOUND OF UPPER GASTROINTESTINAL TRACT N/A 2/7/2025    Procedure: ULTRASOUND, UPPER GI TRACT, ENDOSCOPIC;  Surgeon: Mode Wood MD;  [facility]  Service: Endoscopy;  Laterality: N/A;  1/11 portal-eliquis approved TE 1/11 Dr. Tolentino-Needs an eGD and Eus with ivan-tt  1/31 - attempted precall LVM - Bud  2/4 SWP'S DAUGHTER. PRECALL COMPLETE-RT    ESOPHAGOGASTRODUODENOSCOPY N/A 2/7/2025    Procedure: EGD (ESOPHAGOGASTRODUODENOSCOPY);  Surgeon: " Mode Wood MD;  Location: [facility] ENDO (2ND FLR);  Service: Endoscopy;  Laterality: N/A;    HAND SURGERY Left     JOINT REPLACEMENT      knee replacement right knee    KIDNEY SURGERY      partial left kidney removal - [place]    PARTIAL NEPHRECTOMY Left August 2014    PERCUTANEOUS CRYOTHERAPY OF PERIPHERAL NERVE USING LIQUID NITROUS OXIDE IN CLOSED NEEDLE DEVICE Right 6/17/2019    Procedure: CRYOTHERAPY, NERVE, PERIPHERAL, PERCUTANEOUS, USING LIQUID NITROUS OXIDE IN CLOSED NEEDLE DEVICE-right knee iovera;  Surgeon: Donny Hair III, MD;  Location: [facility] CATH LAB;  Service: Pain Management;  Laterality: Right;    TOTAL KNEE ARTHROPLASTY Right 6/27/2019    Procedure: ARTHROPLASTY, KNEE, TOTAL-SAME DAY;  Surgeon: Mikael Huerta MD;  Location: [facility] OR 2ND FLR;  Service: Orthopedics;  Laterality: Right;     ROS  10 point ROS performed and negative except as stated in HPI     Physical Examination   Vital Signs  Vitals  Temp: 98.8 °F (37.1 °C)  Temp Source: Temporal  Pulse: (!) 57  Heart Rate Source: SpO2  Resp: 19  SpO2: 98 %  Pulse Oximetry Type: Intermittent  BP: 134/65  BP Location: Right arm  BP Method: Automatic  Patient Position: Lying    24 Hour VS Range  Temp:  [98.8 °F (37.1 °C)]   Pulse:  [57]   Resp:  [19]   BP: (121-134)/(65-68)   SpO2:  [98 %]   No intake or output data in the 24 hours ending 05/06/25 0756      Physical Exam  Constitutional:       Appearance: Normal appearance. He is obese.   Eyes:      Pupils: Pupils are equal, round, and reactive to light.   Neck:      Vascular: No JVD.   Cardiovascular:      Rate and Rhythm: Normal rate and regular rhythm.      Pulses: Normal pulses.      Heart sounds: Normal heart sounds.   Pulmonary:      Effort: Pulmonary effort is normal.      Breath sounds: Normal breath sounds.   Abdominal:      Palpations: Abdomen is soft.   Musculoskeletal:      Right lower leg: No edema.      Left lower leg: No edema.   Skin:     General: Skin is warm.   Neurological:       General: No focal deficit present.      Mental Status: He is alert and oriented to person, place, and time.   Psychiatric:         Mood and Affect: Mood normal.         Behavior: Behavior normal.         Data     Recent Labs   Lab 05/01/25  1442   WBC 8.83   HGB 13.3*   HCT 41.2         Recent Labs   Lab 05/01/25  1442   PROTIME 11.6   INR 1.0      Recent Labs   Lab 05/01/25  1442      K 4.6      CO2 28   BUN 8   CREATININE 1.1   ANIONGAP 8   CALCIUM 9.5      Assessment & Plan     JOHN for BRYON assessment prior to ablation  -No absolute contraindications of esophageal stricture, tumor, perforation, laceration,or diverticulum and/or active GI bleed  -The risks, benefits & alternatives of the procedure were explained to the patient.   -The risks of transesophageal echo include but are not limited to:  Dental trauma, esophageal trauma/perforation, bleeding, laryngospasm/brochospasm, aspiration, sore throat/hoarseness, & dislodgement of the endotracheal tube/nasogastric tube (where applicable).    -The risks of ANES monitored sedation include hypotension, respiratory depression, arrhythmias, bronchospasm, & death.    -Informed consent was obtained. The patient is agreeable to proceed with the procedure and all questions and concerns addressed.    Case discussed with an attending in echocardiography lab.    Jacquie Rose MD  [facility]   Cardiovascular Disease PGY-V

## 2025-05-07 ENCOUNTER — PATIENT MESSAGE (OUTPATIENT)
Dept: ELECTROPHYSIOLOGY | Facility: CLINIC | Age: 70
End: 2025-05-07
Payer: MEDICARE

## 2025-05-07 DIAGNOSIS — I50.22 HEART FAILURE WITH MILDLY REDUCED EJECTION FRACTION (HFMREF): ICD-10-CM

## 2025-05-07 DIAGNOSIS — I10 PRIMARY HYPERTENSION: ICD-10-CM

## 2025-05-07 DIAGNOSIS — I48.0 PAROXYSMAL ATRIAL FIBRILLATION: Primary | ICD-10-CM

## 2025-05-07 DIAGNOSIS — Z01.818 PRE-OP TESTING: ICD-10-CM

## 2025-05-07 DIAGNOSIS — E66.01 SEVERE OBESITY (BMI 35.0-39.9) WITH COMORBIDITY: ICD-10-CM

## 2025-05-07 DIAGNOSIS — Z79.01 ON ANTICOAGULANT THERAPY: ICD-10-CM

## 2025-05-07 DIAGNOSIS — I48.3 TYPICAL ATRIAL FLUTTER: ICD-10-CM

## 2025-05-07 LAB — POCT GLUCOSE: 160 MG/DL (ref 70–110)

## 2025-05-08 RX ORDER — METOPROLOL SUCCINATE 25 MG/1
25 TABLET, EXTENDED RELEASE ORAL DAILY PRN
Qty: 30 TABLET | Refills: 6 | Status: SHIPPED | OUTPATIENT
Start: 2025-05-08 | End: 2026-05-08

## 2025-05-12 ENCOUNTER — TELEPHONE (OUTPATIENT)
Dept: CARDIOLOGY | Facility: CLINIC | Age: 70
End: 2025-05-12
Payer: MEDICARE

## 2025-05-12 NOTE — TELEPHONE ENCOUNTER
I called Mr. Abadie and spoke with him and his family.  We discussed the Watchman procedure, including specifically his risk of bleeding, and risk of stroke as demonstrated below    [id number] score: 4  HAS-BLED score: 4    If you answer NO to any of the four criteria below, the patient does not meet the WATCHMAN implant eligibility requirements.     Patient has non-valvular atrial fibrillation: Yes  Patient has an increased risk for stroke and is recommended for oral anticoagulation (OAC): Yes  Patient is suitable for short-term anticoagulation therapy but deemed unable to take long-term OAC: Yes  Patient has an appropriate rationale to seek a non-pharmacological alternative to OACs. Specific factors include: History of bleeding or increased bleeding risk, History of risk of falls, and Documented poor compliance with OAC therapy    The risks and benefits of the procedure were discussed with the patient, and the patient wishes to proceed.  Agree with implantation of Watchman device.

## 2025-05-13 ENCOUNTER — CLINICAL SUPPORT (OUTPATIENT)
Dept: REHABILITATION | Facility: HOSPITAL | Age: 70
End: 2025-05-13
Payer: MEDICARE

## 2025-05-13 DIAGNOSIS — R29.3 ABNORMAL POSTURE: ICD-10-CM

## 2025-05-13 DIAGNOSIS — M25.611 DECREASED RIGHT SHOULDER RANGE OF MOTION: Primary | ICD-10-CM

## 2025-05-13 DIAGNOSIS — R29.898 UPPER EXTREMITY WEAKNESS: ICD-10-CM

## 2025-05-13 PROCEDURE — 97112 NEUROMUSCULAR REEDUCATION: CPT | Mod: HCNC,PO,CQ

## 2025-05-13 PROCEDURE — 97530 THERAPEUTIC ACTIVITIES: CPT | Mod: HCNC,PO,CQ

## 2025-05-13 NOTE — PROGRESS NOTES
Physical Therapy Daily Treatment Note     Name: Kleber Schuster Abadie Clinic Number: [id number]    Therapy Diagnosis:   Encounter Diagnoses   Name Primary?    Decreased right shoulder range of motion Yes    Abnormal posture     Upper extremity weakness        Physician: Nicole Kurtz, NP-C    Visit Date: 5/13/2025    Physician Orders: PT Eval and Treat   Medical Diagnosis from Referral: S49.90XD (ICD-10-CM) - Shoulder injury, unspecified laterality, subsequent encounter  Evaluation Date: 1/7/2025  Authorization Period Expiration: 12/31/25  Plan of Care Expiration: 3/31/25 extend POC 5/31/25  Progress Note Due: 4/13/25  Date of Surgery: NA  Visit # / Visits authorized: 21/ 30  FOTO: 2/ 3       Time In: 3:04 pm   Time Out: 3:50 pm  Total Time: 46 minutes  Total Billable Time: 23 minutes      Precautions: ANA    Subjective     Pt reports: slight increase in R shoulder pain since last visit. He notes he has returned to the gym recently and may have over done it resulting in some increased discomfort in  R shoulder    He was compliant with home exercise program.  Response to previous treatment: no adverse effects  Functional change: able to reach top of my head.    Pain: 2/10  Location: right shoulder      Objective     4/3/25  R shoulder  Standing AROM flexion : 145 ;  abduction 120 ; ER 40  AAROM flexion : 150;  abduction 145;  ER 60       Treatment      Kleber received therapeutic exercises to develop strength, endurance, ROM, flexibility, posture and core stabilization for 0 minutes including:    Upper trap stretch 3 x 30 secs   Levator stretch 3 x 30 secs   Supine stretch towel roll x 3 min vertical  Supine stretch towel roll horizontal T2-T4 level x 3 min     (Patient instructed to lift arms as needed in stretches.)    Kleber received the following manual therapy techniques: Joint mobilizations, Manual traction, and Soft tissue Mobilization were applied to the:  for 0 minutes, including:  GH traction  Cervical  traction      Kleber participated in neuromuscular re-education activities to improve: Balance, Coordination, Proprioception and Posture for 20 minutes. The following activities were include    Scapular retraction 2 x 10 hold 3 secs NP  No Money RTB 2 x 10 NP  Cervical retraction 2 x 10 hold 3 secs NP  Supine HOB elevated 45 deg horizontal abduction 2 x 15 green T band   Supine HOB  elevated 45 deg diagonals  2 x 10 green Tband  Supine IR/ ER x 30 2 # weight small amplitude-NP  Standing ER with YTB as tolerated 2 x 10-  Rows standing green Tband 3 x 10-  Shld extension red tband 2 x 10-  Serratus wall slides 1 x 10-np  UBE x 6 minutes (forward/backward)    Kleber participated in dynamic functional therapeutic activities to improve functional performance for 26 minutes, including:  Pulleys flex/abd x 2 minutes ea  [place] 5# dowel 3 x 15 into flexion /scaption  and 3x 10 scaption/abduction +5# ankle weight around wrist  Cabinet reach (# 2 x 10  No weight serratus punches overhead and eccentric lowering to improve reaching in flexion and then scaption towards abduction 2 x 10 each  Unilateral lat pulldown 13# 2 x 10 to improve overhead reaching tolerance.    Np:  Pulley eccentric lowering control on RUE x 1 min  Seated table slides at incline flex/scap x 30 ea          Kleber received cold pack for 00 minutes to R shoulder.      Home Exercises Provided and Patient Education Provided     Education provided:   - HEP review    Written Home Exercises Provided: Patient instructed to cont prior HEP.  Exercises were reviewed and Kleber was able to demonstrate them prior to the end of the session.  Kleber demonstrated good  understanding of the education provided.     See EMR under Patient Instructions for exercises provided prior visit.    Assessment   Kleber returned reporting slight increase in R shoulder pain since he has been exercising on his own at the gym. Functional R shoulder strengthening and stability exercises  "continued with pt tolerating fairly well. On several occasions with arm overhead he experienced "pinching" in anterior shoulder. Will continue to progress per pt's tolerance.     Kleber Is progressing well towards his goals.   Pt prognosis is Good.     Pt will continue to benefit from skilled outpatient physical therapy to address the deficits listed in the problem list box on initial evaluation, provide pt/family education and to maximize pt's level of independence in the home and community environment.     Pt's spiritual, cultural and educational needs considered and pt agreeable to plan of care and goals.     Anticipated barriers to physical therapy: prolonged break in care     Goals: Short Term Goals: 3 weeks   Pt to be Independent with HEP for increased strength, endurance and functional mobility. MET  Pt to have decreased pain 2/10 for increased functional mobility and tolerance. MET  Pt to increase AROM to 120 degrees R shoulder flexion and abduction for increased functional mobility. MET        Long Term Goals: 10 weeks   Pt to be Independent with pain management strategies and verbalize 2 for increased strength, endurance and functional mobility. progressing  Pt to have decreased pain 0/10 for increased functional mobility and tolerance. MET  Pt to increase AROM to 155 degrees R shoulder flexion and abduction for increased functional mobility MET  Pt to increase activity tolerance to 1 hrs for without increased pain for increased overall functional activity. progressing  Pt to increase R shoulder flexion, abduction and ER to 4/5 for increased functional strength and activity. progressing     Plan      Plan of care Certification: 1/7/2025 to 3/31/25 extend POC to 5/31/25     Outpatient Physical Therapy 2 times weekly for 10 weeks to include the following interventions: Cervical/Lumbar Traction, Electrical Stimulation DN, Manual Therapy, Moist Heat/ Ice, Neuromuscular Re-ed, Patient Education, Therapeutic " Activities, and Therapeutic Exercise.     Fabian Choi, PTA

## 2025-05-14 ENCOUNTER — TELEPHONE (OUTPATIENT)
Dept: INTERNAL MEDICINE | Facility: CLINIC | Age: 70
End: 2025-05-14
Payer: MEDICARE

## 2025-05-14 NOTE — TELEPHONE ENCOUNTER
----- Message from Tammi sent at 5/14/2025  1:27 PM CDT -----  Contact: Pt [phone]  1MEDICALADVICE Patient is calling for Medical Advice regarding:Questions for nurse Patient wants a call back or thru [facility]:Call back Comments:Pt would like a call back from nurse regarding medication also pt has hands and shoulder pain Please advise patient replies from provider may take up to 48 hours.

## 2025-05-15 ENCOUNTER — CLINICAL SUPPORT (OUTPATIENT)
Dept: REHABILITATION | Facility: HOSPITAL | Age: 70
End: 2025-05-15
Payer: MEDICARE

## 2025-05-15 ENCOUNTER — TELEPHONE (OUTPATIENT)
Dept: INTERNAL MEDICINE | Facility: CLINIC | Age: 70
End: 2025-05-15
Payer: MEDICARE

## 2025-05-15 DIAGNOSIS — R29.898 UPPER EXTREMITY WEAKNESS: ICD-10-CM

## 2025-05-15 DIAGNOSIS — M25.611 DECREASED RIGHT SHOULDER RANGE OF MOTION: Primary | ICD-10-CM

## 2025-05-15 DIAGNOSIS — R29.3 ABNORMAL POSTURE: ICD-10-CM

## 2025-05-15 PROCEDURE — 97112 NEUROMUSCULAR REEDUCATION: CPT | Mod: HCNC,PO

## 2025-05-15 PROCEDURE — 97530 THERAPEUTIC ACTIVITIES: CPT | Mod: HCNC,PO

## 2025-05-15 RX ORDER — TAMSULOSIN HYDROCHLORIDE 0.4 MG/1
0.4 CAPSULE ORAL DAILY
Qty: 30 CAPSULE | Refills: 11 | Status: SHIPPED | OUTPATIENT
Start: 2025-05-15 | End: 2026-05-15

## 2025-05-17 NOTE — PROGRESS NOTES
Physical Therapy Daily Treatment Note     Name: Kleber Schuster Abadie Clinic Number: [id number]    Therapy Diagnosis:   Encounter Diagnoses   Name Primary?    Decreased right shoulder range of motion Yes    Abnormal posture     Upper extremity weakness        Physician: Nicole Kurtz, NP-C    Visit Date: 5/15/2025    Physician Orders: PT Eval and Treat   Medical Diagnosis from Referral: S49.90XD (ICD-10-CM) - Shoulder injury, unspecified laterality, subsequent encounter  Evaluation Date: 1/7/2025  Authorization Period Expiration: 12/31/25  Plan of Care Expiration: 3/31/25 extend POC 6/30/25  Progress Note Due: 6/15/2025  Date of Surgery: NA  Visit # / Visits authorized: 22/ 30  FOTO: 2/ 3       Time In: 154 pm   Time Out: 254 pm  Total Time: 60 minutes  Total Billable Time: 60 minutes      Precautions: [place]    Subjective     Pt reports: slight decrease in R shoulder pain since last visit. He notes he has some discomfort in  R shoulder    He was compliant with home exercise program.  Response to previous treatment: no adverse effects  Functional change: able to reach top of my head.    Pain: 2/10  Location: right shoulder      Objective     5/15/25  R shoulder  Standing AROM flexion : 155 ;  abduction 150 ; ER 55  AAROM flexion : 165;  abduction 155;  ER 65       Treatment      Kleber received therapeutic exercises to develop strength, endurance, ROM, flexibility, posture and core stabilization for 10 minutes including:    Upper trap stretch 3 x 30 secs   Levator stretch 3 x 30 secs   Supine stretch towel roll x 3 min vertical  Supine stretch towel roll horizontal T2-T4 level x 3 min     (Patient instructed to lift arms as needed in stretches.)    Kleber received the following manual therapy techniques: Joint mobilizations, Manual traction, and Soft tissue Mobilization were applied to the:  for 0 minutes, including:  GH traction  Cervical traction      Kleber participated in neuromuscular re-education activities to  improve: Balance, Coordination, Proprioception and Posture for 25 minutes. The following activities were include    Scapular retraction 2 x 10 hold 3 secs   No Money RTB 2 x 10 NP  Cervical retraction 2 x 10 hold 3 secs NP  Supine HOB elevated 45 deg horizontal abduction 2 x 15 green T band   Supine HOB  elevated 45 deg diagonals  2 x 10 green Tband  Supine IR/ ER x 30 2 # weight small amplitude-NP  Standing ER with YTB as tolerated 2 x 10-  Rows standing green Tband 3 x 10-  Shld extension red tband 2 x 10-  Serratus wall slides 1 x 10  UBE x 6 minutes (forward/backward)    Kleber participated in dynamic functional therapeutic activities to improve functional performance for 25 minutes, including:  Pulleys flex/abd x 2 minutes ea  [place] 5# dowel 3 x 15 into flexion /scaption  and 3x 10 scaption/abduction +5# ankle weight around wrist  Cabinet reach (# 2 x 10  No weight serratus punches overhead and eccentric lowering to improve reaching in flexion and then scaption towards abduction 2 x 10 each  Unilateral lat pulldown 13# 2 x 10 to improve overhead reaching tolerance.    Np:  Pulley eccentric lowering control on RUE x 1 min  Seated table slides at incline flex/scap x 30 ea          Kleber received cold pack for 00 minutes to R shoulder.      Home Exercises Provided and Patient Education Provided     Education provided:   - HEP review    Written Home Exercises Provided: Patient instructed to cont prior HEP.  Exercises were reviewed and Kleber was able to demonstrate them prior to the end of the session.  Kleber demonstrated good  understanding of the education provided.     See EMR under Patient Instructions for exercises provided prior visit.    Assessment   Kleber had improved with proper rest breaks leading to increased overall strengthening in session with increased workload without compromising form and needed min cues for scapular hiking at time and to exercise in pain free range with reduced range or  resistance to improve mechanics.      Kleber Is progressing well towards his goals.   Pt prognosis is Good.     Pt will continue to benefit from skilled outpatient physical therapy to address the deficits listed in the problem list box on initial evaluation, provide pt/family education and to maximize pt's level of independence in the home and community environment.     Pt's spiritual, cultural and educational needs considered and pt agreeable to plan of care and goals.     Anticipated barriers to physical therapy: prolonged break in care     Goals: Short Term Goals: 3 weeks   Pt to be Independent with HEP for increased strength, endurance and functional mobility. MET  Pt to have decreased pain 2/10 for increased functional mobility and tolerance. MET  Pt to increase AROM to 120 degrees R shoulder flexion and abduction for increased functional mobility. MET        Long Term Goals: 10 weeks   Pt to be Independent with pain management strategies and verbalize 2 for increased strength, endurance and functional mobility. progressing  Pt to have decreased pain 0/10 for increased functional mobility and tolerance. MET  Pt to increase AROM to 155 degrees R shoulder flexion and abduction for increased functional mobility MET  Pt to increase activity tolerance to 1 hrs for without increased pain for increased overall functional activity. progressing  Pt to increase R shoulder flexion, abduction and ER to 4/5 for increased functional strength and activity. progressing     Plan      Plan of care Certification: 1/7/2025 to 3/31/25 extend POC to 6/30/25     Outpatient Physical Therapy 2 times weekly for 10 weeks to include the following interventions: Cervical/Lumbar Traction, Electrical Stimulation DN, Manual Therapy, Moist Heat/ Ice, Neuromuscular Re-ed, Patient Education, Therapeutic Activities, and Therapeutic Exercise.     Roxy Cortez, PT

## 2025-05-20 ENCOUNTER — CLINICAL SUPPORT (OUTPATIENT)
Dept: REHABILITATION | Facility: HOSPITAL | Age: 70
End: 2025-05-20
Payer: MEDICARE

## 2025-05-20 DIAGNOSIS — R29.898 UPPER EXTREMITY WEAKNESS: ICD-10-CM

## 2025-05-20 DIAGNOSIS — R29.3 ABNORMAL POSTURE: ICD-10-CM

## 2025-05-20 DIAGNOSIS — M25.611 DECREASED RIGHT SHOULDER RANGE OF MOTION: Primary | ICD-10-CM

## 2025-05-20 PROCEDURE — 97112 NEUROMUSCULAR REEDUCATION: CPT | Mod: HCNC,PO,CQ

## 2025-05-20 PROCEDURE — 97530 THERAPEUTIC ACTIVITIES: CPT | Mod: HCNC,PO,CQ

## 2025-05-20 NOTE — PROGRESS NOTES
"Physical Therapy Daily Treatment Note     [facility]  [facility] Number: [id number]    Therapy Diagnosis:   Encounter Diagnoses   Name Primary?    Decreased right shoulder range of motion Yes    Abnormal posture     Upper extremity weakness        Physician: Nicole Kurtz, NP-C    Visit Date: 5/20/2025    Physician Orders: PT Eval and Treat   Medical Diagnosis from Referral: S49.90XD (ICD-10-CM) - Shoulder injury, unspecified laterality, subsequent encounter  Evaluation Date: 1/7/2025  Authorization Period Expiration: 12/31/25  Plan of Care Expiration: 3/31/25 extend POC 6/30/25  Progress Note Due: 6/15/2025  Date of Surgery: NA  Visit # / Visits authorized: 23/ 30  FOTO: 2/ 3       Time In: 2:14 pm   Time Out: 3:00 pm  Total Time: 46 minutes  Total Billable Time: 23 minutes      Precautions: [place]    Subjective     Pt reports: only min R shoulder pain currently, stating "my shoulder is feeling better better better".    He was compliant with home exercise program.  Response to previous treatment: no adverse effects  Functional change: able to reach top of my head.    Pain: 1/10  Location: right shoulder      Objective     5/15/25  R shoulder  Standing AROM flexion : 155 ;  abduction 150 ; ER 55  AAROM flexion : 165;  abduction 155;  ER 65       Treatment      Kleber received therapeutic exercises to develop strength, endurance, ROM, flexibility, posture and core stabilization for 5 minutes including:    Upper trap stretch 3 x 30 secs   Levator stretch 3 x 30 secs   Supine stretch towel roll x 3 min vertical  Supine stretch towel roll horizontal T2-T4 level x 3 min     (Patient instructed to lift arms as needed in stretches.)    Kleber received the following manual therapy techniques: Joint mobilizations, Manual traction, and Soft tissue Mobilization were applied to the:  for 0 minutes, including:  GH traction  Cervical traction      Kleber participated in neuromuscular re-education activities to improve: " Balance, Coordination, Proprioception and Posture for 31 minutes. The following activities were include    Scapular retraction 2 x 10 hold 3 secs   No Money RTB 2 x 10 NP  Cervical retraction 2 x 10 hold 3 secs NP  Supine HOB elevated 45 deg horizontal abduction 2 x 15 green T band   Supine HOB  elevated 45 deg diagonals  2 x 10 green Tband  Supine IR/ ER x 30 2 # weight small amplitude-NP  Standing ER with YTB as tolerated 2 x 10-  Rows standing green Tband 3 x 10-  Shld extension red tband 2 x 10-  Serratus wall slides 1 x 10  UBE x 6 minutes (forward/backward)    Kleber participated in dynamic functional therapeutic activities to improve functional performance for 10 minutes, including:  Pulleys flex/abd x 2 minutes ea  [place] 5# dowel 3 x 15 into flexion /scaption  and 3x 10 scaption/abduction +5# ankle weight around wrist  Cabinet reach (# 2 x 10  No weight serratus punches overhead and eccentric lowering to improve reaching in flexion and then scaption towards abduction 2 x 10 each  Unilateral lat pulldown 13# 2 x 10 to improve overhead reaching tolerance.    Np:  Pulley eccentric lowering control on RUE x 1 min  Seated table slides at incline flex/scap x 30 ea          Kleber received cold pack for 00 minutes to R shoulder.      Home Exercises Provided and Patient Education Provided     Education provided:   - HEP review    Written Home Exercises Provided: Patient instructed to cont prior HEP.  Exercises were reviewed and Kleber was able to demonstrate them prior to the end of the session.  Kleber demonstrated good  understanding of the education provided.     See EMR under Patient Instructions for exercises provided prior visit.    Assessment   Kleber continues to report only min R shoulder pain levels and notes functional improvements in both shoulder mobility and strength. Good carry over with pt able to demonstrate improved scapular mechanics with over head movements although he continues to experience  some discomfort at end range of these motions. Will continue to progress per pt's tolerance.     Kleber Is progressing well towards his goals.   Pt prognosis is Good.     Pt will continue to benefit from skilled outpatient physical therapy to address the deficits listed in the problem list box on initial evaluation, provide pt/family education and to maximize pt's level of independence in the home and community environment.     Pt's spiritual, cultural and educational needs considered and pt agreeable to plan of care and goals.     Anticipated barriers to physical therapy: prolonged break in care     Goals: Short Term Goals: 3 weeks   Pt to be Independent with HEP for increased strength, endurance and functional mobility. MET  Pt to have decreased pain 2/10 for increased functional mobility and tolerance. MET  Pt to increase AROM to 120 degrees R shoulder flexion and abduction for increased functional mobility. MET        Long Term Goals: 10 weeks   Pt to be Independent with pain management strategies and verbalize 2 for increased strength, endurance and functional mobility. progressing  Pt to have decreased pain 0/10 for increased functional mobility and tolerance. MET  Pt to increase AROM to 155 degrees R shoulder flexion and abduction for increased functional mobility MET  Pt to increase activity tolerance to 1 hrs for without increased pain for increased overall functional activity. progressing  Pt to increase R shoulder flexion, abduction and ER to 4/5 for increased functional strength and activity. progressing     Plan      Plan of care Certification: 1/7/2025 to 3/31/25 extend POC to 6/30/25     Outpatient Physical Therapy 2 times weekly for 10 weeks to include the following interventions: Cervical/Lumbar Traction, Electrical Stimulation DN, Manual Therapy, Moist Heat/ Ice, Neuromuscular Re-ed, Patient Education, Therapeutic Activities, and Therapeutic Exercise.     Fabian Choi, PTA

## 2025-05-22 ENCOUNTER — CLINICAL SUPPORT (OUTPATIENT)
Dept: REHABILITATION | Facility: HOSPITAL | Age: 70
End: 2025-05-22
Payer: MEDICARE

## 2025-05-22 DIAGNOSIS — R29.3 ABNORMAL POSTURE: ICD-10-CM

## 2025-05-22 DIAGNOSIS — M25.611 DECREASED RIGHT SHOULDER RANGE OF MOTION: Primary | ICD-10-CM

## 2025-05-22 DIAGNOSIS — R29.898 UPPER EXTREMITY WEAKNESS: ICD-10-CM

## 2025-05-22 PROCEDURE — 97110 THERAPEUTIC EXERCISES: CPT | Mod: HCNC,PO,CQ

## 2025-05-22 PROCEDURE — 97112 NEUROMUSCULAR REEDUCATION: CPT | Mod: HCNC,PO,CQ

## 2025-05-22 NOTE — PROGRESS NOTES
"Physical Therapy Daily Treatment Note     [facility]  [facility] Number: [id number]    Therapy Diagnosis:   Encounter Diagnoses   Name Primary?    Decreased right shoulder range of motion Yes    Abnormal posture     Upper extremity weakness        Physician: Nicole Kurtz, NP-C    Visit Date: 5/22/2025    Physician Orders: PT Eval and Treat   Medical Diagnosis from Referral: S49.90XD (ICD-10-CM) - Shoulder injury, unspecified laterality, subsequent encounter  Evaluation Date: 1/7/2025  Authorization Period Expiration: 12/31/25  Plan of Care Expiration: 3/31/25 extend POC 6/30/25  Progress Note Due: 6/15/2025  Date of Surgery: NA  Visit # / Visits authorized: 24/ 30  FOTO: 2/ 3       Time In: 2:00 pm   Time Out: 2:47 pm  Total Time: 47 minutes  Total Billable Time: 23 minutes      Precautions: [place]    Subjective     Pt reports: only min R shoulder pain currently, stating "my shoulder is feeling better better better".    He was compliant with home exercise program.  Response to previous treatment: no adverse effects  Functional change: able to reach top of my head.    Pain: 1/10  Location: right shoulder      Objective     5/15/25  R shoulder  Standing AROM flexion : 155 ;  abduction 150 ; ER 55  AAROM flexion : 165;  abduction 155;  ER 65       Treatment      Kleber received therapeutic exercises to develop strength, endurance, ROM, flexibility, posture and core stabilization for 5 minutes including:    Upper trap stretch 3 x 30 secs   Levator stretch 3 x 30 secs   Supine stretch towel roll x 3 min vertical  Supine stretch towel roll horizontal T2-T4 level x 3 min     (Patient instructed to lift arms as needed in stretches.)    Kleber received the following manual therapy techniques: Joint mobilizations, Manual traction, and Soft tissue Mobilization were applied to the:  for 0 minutes, including:  GH traction  Cervical traction      Kleber participated in neuromuscular re-education activities to improve: " Balance, Coordination, Proprioception and Posture for 31 minutes. The following activities were include    Scapular retraction 2 x 10 hold 3 secs   No Money RTB 2 x 10 NP  Cervical retraction 2 x 10 hold 3 secs NP  Supine HOB elevated 45 deg horizontal abduction 2 x 15 green T band   Supine HOB  elevated 45 deg diagonals  2 x 10 green Tband  Supine IR/ ER x 30 2 # weight small amplitude-NP  Standing ER with YTB as tolerated 2 x 10-  Rows standing green Tband 3 x 10-  Shld extension red tband 2 x 10-  Serratus wall slides 1 x 10  UBE x 6 minutes (forward/backward)    Kleber participated in dynamic functional therapeutic activities to improve functional performance for 10 minutes, including:  Pulleys flex/abd x 2 minutes ea  [place] 5# dowel 3 x 15 into flexion /scaption  and 3x 10 scaption/abduction +5# ankle weight around wrist  Cabinet reach 1# 2 x 10  No weight serratus punches overhead and eccentric lowering to improve reaching in flexion and then scaption towards abduction 2 x 10 each  Unilateral lat pulldown 13# 2 x 10 to improve overhead reaching tolerance.    Np:  Pulley eccentric lowering control on RUE x 1 min  Seated table slides at incline flex/scap x 30 ea          Kleber received cold pack for 00 minutes to R shoulder.      Home Exercises Provided and Patient Education Provided     Education provided:   - HEP review    Written Home Exercises Provided: Patient instructed to cont prior HEP.  Exercises were reviewed and Kleber was able to demonstrate them prior to the end of the session.  Kleber demonstrated good  understanding of the education provided.     See EMR under Patient Instructions for exercises provided prior visit.    Assessment   Kleber continues to report only min R shoulder pain levels and notes functional improvements in both shoulder mobility and strength. Good carry over with pt able to demonstrate improved scapular mechanics with over head movements although he continues to experience  some discomfort at end range of these motions. Cabinet reaches give pt most difficulty with pain near biceps tendon insertion.  Will continue to progress per pt's tolerance.     Kleber Is progressing well towards his goals.   Pt prognosis is Good.     Pt will continue to benefit from skilled outpatient physical therapy to address the deficits listed in the problem list box on initial evaluation, provide pt/family education and to maximize pt's level of independence in the home and community environment.     Pt's spiritual, cultural and educational needs considered and pt agreeable to plan of care and goals.     Anticipated barriers to physical therapy: prolonged break in care     Goals: Short Term Goals: 3 weeks   Pt to be Independent with HEP for increased strength, endurance and functional mobility. MET  Pt to have decreased pain 2/10 for increased functional mobility and tolerance. MET  Pt to increase AROM to 120 degrees R shoulder flexion and abduction for increased functional mobility. MET        Long Term Goals: 10 weeks   Pt to be Independent with pain management strategies and verbalize 2 for increased strength, endurance and functional mobility. progressing  Pt to have decreased pain 0/10 for increased functional mobility and tolerance. MET  Pt to increase AROM to 155 degrees R shoulder flexion and abduction for increased functional mobility MET  Pt to increase activity tolerance to 1 hrs for without increased pain for increased overall functional activity. progressing  Pt to increase R shoulder flexion, abduction and ER to 4/5 for increased functional strength and activity. progressing     Plan      Plan of care Certification: 1/7/2025 to 3/31/25 extend POC to 6/30/25     Outpatient Physical Therapy 2 times weekly for 10 weeks to include the following interventions: Cervical/Lumbar Traction, Electrical Stimulation DN, Manual Therapy, Moist Heat/ Ice, Neuromuscular Re-ed, Patient Education, Therapeutic  Activities, and Therapeutic Exercise.     Fabian Choi, PTA

## 2025-05-27 ENCOUNTER — CLINICAL SUPPORT (OUTPATIENT)
Dept: REHABILITATION | Facility: HOSPITAL | Age: 70
End: 2025-05-27
Payer: MEDICARE

## 2025-05-27 DIAGNOSIS — R29.3 ABNORMAL POSTURE: ICD-10-CM

## 2025-05-27 DIAGNOSIS — M25.611 DECREASED RIGHT SHOULDER RANGE OF MOTION: Primary | ICD-10-CM

## 2025-05-27 DIAGNOSIS — R29.898 UPPER EXTREMITY WEAKNESS: ICD-10-CM

## 2025-05-27 PROCEDURE — 97112 NEUROMUSCULAR REEDUCATION: CPT | Mod: PO,CQ

## 2025-05-27 PROCEDURE — 97530 THERAPEUTIC ACTIVITIES: CPT | Mod: PO,CQ

## 2025-05-27 NOTE — PROGRESS NOTES
"Physical Therapy Daily Treatment Note     Name: Donald Warren [facility]adie  Clinic Number: [id number]    Therapy Diagnosis:   Encounter Diagnoses   Name Primary?    Decreased right shoulder range of motion Yes    Abnormal posture     Upper extremity weakness        Physician: Nicole Kurtz, NP-C    Visit Date: 5/27/2025    Physician Orders: PT Eval and Treat   Medical Diagnosis from Referral: S49.90XD (ICD-10-CM) - Shoulder injury, unspecified laterality, subsequent encounter  Evaluation Date: 1/7/2025  Authorization Period Expiration: 12/31/25  Plan of Care Expiration: 3/31/25 extend POC 6/30/25  Progress Note Due: 6/15/2025  Date of Surgery: NA  Visit # / Visits authorized: 24/ 30  FOTO: 2/ 3       Time In: 2:15 pm (Late arrival)  Time Out: 3:00 pm  Total Time: 45 minutes  Total Billable Time: 45 minutes      Precautions: [place]    Subjective     Pt reports: "I been working out my arm a lot at home so it's been feeling stiff"    He was compliant with home exercise program.  Response to previous treatment: no adverse effects  Functional change: able to reach top of my head.    Pain: 1/10  Location: right shoulder      Objective     5/15/25  R shoulder  Standing AROM flexion : 155 ;  abduction 150 ; ER 55  AAROM flexion : 165;  abduction 155;  ER 65       Treatment      Kleber received therapeutic exercises to develop strength, endurance, ROM, flexibility, posture and core stabilization for 6 minutes including:    Upper trap stretch 3 x 30 secs   Levator stretch 3 x 30 secs   Supine stretch towel roll x 3 min vertical  Supine stretch towel roll horizontal T2-T4 level x 3 min     (Patient instructed to lift arms as needed in stretches.)    Kleber received the following manual therapy techniques: Joint mobilizations, Manual traction, and Soft tissue Mobilization were applied to the:  for 0 minutes, including:  GH traction  Cervical traction      Kleber participated in neuromuscular re-education activities to improve: Balance, " Coordination, Proprioception and Posture for 30 minutes. The following activities were include    Scapular retraction 2 x 10 hold 3 secs   No Money RTB 2 x 10 NP  Cervical retraction 2 x 10 hold 3 secs NP  Supine HOB elevated 45 deg horizontal abduction 2 x 15 green T band   Supine HOB  elevated 45 deg diagonals  2 x 10 green Tband  Supine IR/ ER x 30 2 # weight small amplitude-NP  Standing ER with RTB as tolerated 3 x 10  90/90 IR YTB 2 x 10  Rows standing green Tband 3 x 10-  Shld extension red tband 2 x 10-  Serratus wall slides 1 x 10  UBE x 6 minutes (forward/backward)    Kleber participated in dynamic functional therapeutic activities to improve functional performance for 9 minutes, including:  Pulleys flex/abd x 2 minutes ea  [place] 5# dowel 3 x 15 into flexion /scaption  and 3x 10 scaption/abduction +5# ankle weight around wrist  Cabinet reach 1# 2 x 10  No weight serratus punches overhead and eccentric lowering to improve reaching in flexion and then scaption towards abduction 2 x 10 each  Unilateral lat pulldown 13# 2 x 10 to improve overhead reaching tolerance.    Np:  Pulley eccentric lowering control on RUE x 1 min  Seated table slides at incline flex/scap x 30 ea          Kleber received cold pack for 00 minutes to R shoulder.      Home Exercises Provided and Patient Education Provided     Education provided:   - HEP review    Written Home Exercises Provided: Patient instructed to cont prior HEP.  Exercises were reviewed and Kleber was able to demonstrate them prior to the end of the session.  Kleber demonstrated good  understanding of the education provided.     See EMR under Patient Instructions for exercises provided prior visit.    Assessment   Kleber continues to report only min R shoulder pain levels and notes functional improvements in both shoulder mobility and strength. Increased emphasis on overhead shoulder stability/strengthening with the addition of 90/90 shoulder IR. ER was attempted  in this positioning but pt had a difficult time maintaining proper form. Pt did however note improved comfort with cabinet reaches. Will continue to progress per pt's tolerance.     Kleber Is progressing well towards his goals.   Pt prognosis is Good.     Pt will continue to benefit from skilled outpatient physical therapy to address the deficits listed in the problem list box on initial evaluation, provide pt/family education and to maximize pt's level of independence in the home and community environment.     Pt's spiritual, cultural and educational needs considered and pt agreeable to plan of care and goals.     Anticipated barriers to physical therapy: prolonged break in care     Goals: Short Term Goals: 3 weeks   Pt to be Independent with HEP for increased strength, endurance and functional mobility. MET  Pt to have decreased pain 2/10 for increased functional mobility and tolerance. MET  Pt to increase AROM to 120 degrees R shoulder flexion and abduction for increased functional mobility. MET        Long Term Goals: 10 weeks   Pt to be Independent with pain management strategies and verbalize 2 for increased strength, endurance and functional mobility. progressing  Pt to have decreased pain 0/10 for increased functional mobility and tolerance. MET  Pt to increase AROM to 155 degrees R shoulder flexion and abduction for increased functional mobility MET  Pt to increase activity tolerance to 1 hrs for without increased pain for increased overall functional activity. progressing  Pt to increase R shoulder flexion, abduction and ER to 4/5 for increased functional strength and activity. progressing     Plan      Plan of care Certification: 1/7/2025 to 3/31/25 extend POC to 6/30/25     Outpatient Physical Therapy 2 times weekly for 10 weeks to include the following interventions: Cervical/Lumbar Traction, Electrical Stimulation DN, Manual Therapy, Moist Heat/ Ice, Neuromuscular Re-ed, Patient Education, Therapeutic  Activities, and Therapeutic Exercise.     Fabian Choi, PTA

## 2025-06-05 RX ORDER — ALLOPURINOL 100 MG/1
200 TABLET ORAL DAILY
Qty: 180 TABLET | Refills: 0 | Status: SHIPPED | OUTPATIENT
Start: 2025-06-05

## 2025-06-10 ENCOUNTER — CLINICAL SUPPORT (OUTPATIENT)
Dept: REHABILITATION | Facility: HOSPITAL | Age: 70
End: 2025-06-10
Payer: MEDICARE

## 2025-06-10 DIAGNOSIS — R29.3 ABNORMAL POSTURE: ICD-10-CM

## 2025-06-10 DIAGNOSIS — M25.611 DECREASED RIGHT SHOULDER RANGE OF MOTION: Primary | ICD-10-CM

## 2025-06-10 DIAGNOSIS — R29.898 UPPER EXTREMITY WEAKNESS: ICD-10-CM

## 2025-06-10 PROCEDURE — 97112 NEUROMUSCULAR REEDUCATION: CPT | Mod: HCNC,PO,CQ

## 2025-06-10 PROCEDURE — 97530 THERAPEUTIC ACTIVITIES: CPT | Mod: HCNC,PO,CQ

## 2025-06-10 NOTE — PROGRESS NOTES
Physical Therapy Daily Treatment Note     [facility]  [facility] Number: [id number]    Therapy Diagnosis:   Encounter Diagnoses   Name Primary?    Decreased right shoulder range of motion Yes    Abnormal posture     Upper extremity weakness        Physician: Nicole Kurtz, NP-C    Visit Date: 6/10/2025    Physician Orders: PT Eval and Treat   Medical Diagnosis from Referral: S49.90XD (ICD-10-CM) - Shoulder injury, unspecified laterality, subsequent encounter  Evaluation Date: 1/7/2025  Authorization Period Expiration: 12/31/25  Plan of Care Expiration: 3/31/25 extend POC 6/30/25  Progress Note Due: 6/15/2025  Date of Surgery: NA  Visit # / Visits authorized: 26/ 30  FOTO: 2/ 3       Time In: 1:50 pm   Time Out: 2:45 pm  Total Time: 55 minutes  Total Billable Time: 27 minutes      Precautions: [place]    Subjective     Pt reports: continued min R shoulder pain, especially with overhead motions    He was compliant with home exercise program.  Response to previous treatment: no adverse effects  Functional change: able to reach top of my head.    Pain: 1/10  Location: right shoulder      Objective     5/15/25  R shoulder  Standing AROM flexion : 155 ;  abduction 150 ; ER 55  AAROM flexion : 165;  abduction 155;  ER 65       Treatment      Kleber received therapeutic exercises to develop strength, endurance, ROM, flexibility, posture and core stabilization for 8 minutes including:    Upper trap stretch 3 x 30 secs   Levator stretch 3 x 30 secs   Supine stretch towel roll x 3 min vertical  Supine stretch towel roll horizontal T2-T4 level x 3 min     (Patient instructed to lift arms as needed in stretches.)    Kleber received the following manual therapy techniques: Joint mobilizations, Manual traction, and Soft tissue Mobilization were applied to the:  for 0 minutes, including:  GH traction  Cervical traction      Kleber participated in neuromuscular re-education activities to improve: Balance, Coordination,  Proprioception and Posture for 30 minutes. The following activities were include    Scapular retraction 2 x 10 hold 3 secs   No Money RTB 2 x 10   Cervical retraction 2 x 10 hold 3 secs NP  Supine HOB elevated 45 deg horizontal abduction 2 x 15 green T band   Supine HOB  elevated 45 deg diagonals  2 x 10 green Tband  Supine IR/ ER x 30 2 # weight small amplitude-NP  Standing ER with RTB as tolerated 3 x 10  90/90 IR YTB 2 x 10  Rows standing green Tband 3 x 10-  Shld extension red tband 2 x 10-  Serratus wall slides 1 x 10  UBE x 6 minutes (forward/backward)    Kleber participated in dynamic functional therapeutic activities to improve functional performance for 17 minutes, including:  Pulleys flex/abd x 2 minutes ea  [place] 5# dowel 3 x 15 into flexion /scaption  and 3x 10 scaption/abduction +5# ankle weight around wrist  Cabinet reach 1# 2 x 10  No weight serratus punches overhead and eccentric lowering to improve reaching in flexion and then scaption towards abduction 2 x 10 each  Unilateral lat pulldown 13# 2 x 10 to improve overhead reaching tolerance.    Np:  Pulley eccentric lowering control on RUE x 1 min  Seated table slides at incline flex/scap x 30 ea          Kleber received cold pack for 00 minutes to R shoulder.      Home Exercises Provided and Patient Education Provided     Education provided:   - HEP review    Written Home Exercises Provided: Patient instructed to cont prior HEP.  Exercises were reviewed and Kleber was able to demonstrate them prior to the end of the session.  Kleber demonstrated good  understanding of the education provided.     See EMR under Patient Instructions for exercises provided prior visit.    Assessment   Kleber continues to report only min R shoulder pain levels. Cueing still required to slow down pace of exercises to ensure scapular humeral rhythm and decreased UT compensations. These compensations seem to limit shoulder mobility and continue to cause pain in anterior  shoulder.  Will continue to progress per pt's tolerance.     Kleber Is progressing well towards his goals.   Pt prognosis is Good.     Pt will continue to benefit from skilled outpatient physical therapy to address the deficits listed in the problem list box on initial evaluation, provide pt/family education and to maximize pt's level of independence in the home and community environment.     Pt's spiritual, cultural and educational needs considered and pt agreeable to plan of care and goals.     Anticipated barriers to physical therapy: prolonged break in care     Goals: Short Term Goals: 3 weeks   Pt to be Independent with HEP for increased strength, endurance and functional mobility. MET  Pt to have decreased pain 2/10 for increased functional mobility and tolerance. MET  Pt to increase AROM to 120 degrees R shoulder flexion and abduction for increased functional mobility. MET        Long Term Goals: 10 weeks   Pt to be Independent with pain management strategies and verbalize 2 for increased strength, endurance and functional mobility. progressing  Pt to have decreased pain 0/10 for increased functional mobility and tolerance. MET  Pt to increase AROM to 155 degrees R shoulder flexion and abduction for increased functional mobility MET  Pt to increase activity tolerance to 1 hrs for without increased pain for increased overall functional activity. progressing  Pt to increase R shoulder flexion, abduction and ER to 4/5 for increased functional strength and activity. progressing     Plan      Plan of care Certification: 1/7/2025 to 3/31/25 extend POC to 6/30/25     Outpatient Physical Therapy 2 times weekly for 10 weeks to include the following interventions: Cervical/Lumbar Traction, Electrical Stimulation DN, Manual Therapy, Moist Heat/ Ice, Neuromuscular Re-ed, Patient Education, Therapeutic Activities, and Therapeutic Exercise.     Fabian Choi, PTA

## 2025-06-13 ENCOUNTER — PATIENT MESSAGE (OUTPATIENT)
Dept: ELECTROPHYSIOLOGY | Facility: CLINIC | Age: 70
End: 2025-06-13
Payer: MEDICARE

## 2025-06-13 DIAGNOSIS — I50.22 HEART FAILURE WITH MILDLY REDUCED EJECTION FRACTION (HFMREF): ICD-10-CM

## 2025-06-13 DIAGNOSIS — I48.0 PAROXYSMAL ATRIAL FIBRILLATION: Primary | ICD-10-CM

## 2025-06-13 DIAGNOSIS — I48.3 TYPICAL ATRIAL FLUTTER: ICD-10-CM

## 2025-06-17 ENCOUNTER — CLINICAL SUPPORT (OUTPATIENT)
Dept: REHABILITATION | Facility: HOSPITAL | Age: 70
End: 2025-06-17
Payer: MEDICARE

## 2025-06-17 DIAGNOSIS — R29.898 UPPER EXTREMITY WEAKNESS: ICD-10-CM

## 2025-06-17 DIAGNOSIS — R29.3 ABNORMAL POSTURE: ICD-10-CM

## 2025-06-17 DIAGNOSIS — M25.611 DECREASED RIGHT SHOULDER RANGE OF MOTION: Primary | ICD-10-CM

## 2025-06-17 PROCEDURE — 97112 NEUROMUSCULAR REEDUCATION: CPT | Mod: HCNC,PO

## 2025-06-17 PROCEDURE — 97530 THERAPEUTIC ACTIVITIES: CPT | Mod: HCNC,PO

## 2025-06-18 NOTE — PROGRESS NOTES
Physical Therapy Daily Treatment Note     Name: Kle[facility] Number: [id number]    Therapy Diagnosis:   Encounter Diagnoses   Name Primary?    Decreased right shoulder range of motion Yes    Abnormal posture     Upper extremity weakness        Physician: Nicole Kurtz, NP-C    Visit Date: 6/17/2025    Physician Orders: PT Eval and Treat   Medical Diagnosis from Referral: S49.90XD (ICD-10-CM) - Shoulder injury, unspecified laterality, subsequent encounter  Evaluation Date: 1/7/2025  Authorization Period Expiration: 12/31/25  Plan of Care Expiration: 3/31/25 extend POC 6/30/25  Progress Note Due: 6/15/2025  Date of Surgery: NA  Visit # / Visits authorized: 26/ 30  FOTO: 2/ 3       Time In: 202 pm   Time Out: 305 pm  Total Time: 63 minutes  Total Billable Time: 40 minutes      Precautions: [place]    Subjective     Pt reports: continued min R shoulder pain but better overall and can move my arm overhead to do most of the work outside and inside without too much issues.     He was compliant with home exercise program.  Response to previous treatment: no adverse effects  Functional change: able to reach top of my head.    Pain: 1/10  Location: right shoulder      Objective     5/15/25  R shoulder  Standing AROM flexion : 155 ;  abduction 150 ; ER 55  AAROM flexion : 165;  abduction 155;  ER 65       Treatment      Kleber received therapeutic exercises to develop strength, endurance, ROM, flexibility, posture and core stabilization for 8 minutes including:    Upper trap stretch 3 x 30 secs   Levator stretch 3 x 30 secs   Supine stretch towel roll x 3 min vertical  Supine stretch towel roll horizontal T2-T4 level x 3 min     (Patient instructed to lift arms as needed in stretches.)    Kleber received the following manual therapy techniques: Joint mobilizations, Manual traction, and Soft tissue Mobilization were applied to the:  for 0 minutes, including:  GH traction  Cervical traction      Kleber participated in  neuromuscular re-education activities to improve: Balance, Coordination, Proprioception and Posture for 30 minutes. The following activities were include    Scapular retraction 2 x 10 hold 3 secs   No Money RTB 2 x 10   Cervical retraction 2 x 10 hold 3 secs NP  Supine HOB elevated 45 deg horizontal abduction 2 x 15 green T band   Supine HOB  elevated 45 deg diagonals  2 x 10 green Tband  Supine IR/ ER x 30 2 # weight small amplitude-  Standing ER with RTB as tolerated 3 x 10  90/90 IR YTB 2 x 10  Rows standing green Tband 3 x 10-  Shld extension red tband 2 x 10-  Serratus wall slides 1 x 10  UBE x 6 minutes (forward/backward)    Kleber participated in dynamic functional therapeutic activities to improve functional performance for 25 minutes, including:  Pulleys flex/abd x 2 minutes ea  [place] 5# dowel 3 x 15 into flexion /scaption  and 3x 10 scaption/abduction +5# ankle weight around wrist  Cabinet reach 1# 2 x 10  No weight serratus punches overhead and eccentric lowering to improve reaching in flexion and then scaption towards abduction 2 x 10 each  Unilateral lat pulldown 13# 2 x 10 to improve overhead reaching tolerance.  Palloff press 2 x 10 GTB  Serratus wall slides 2 x 10              Kleber received cold pack for 00 minutes to R shoulder.      Home Exercises Provided and Patient Education Provided     Education provided:   - HEP review    Written Home Exercises Provided: Patient instructed to cont prior HEP.  Exercises were reviewed and Kleber was able to demonstrate them prior to the end of the session.  Kleber demonstrated good  understanding of the education provided.     See EMR under Patient Instructions for exercises provided prior visit.    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     Kleber Is progressing well towards his  goals.   Pt prognosis is Good.     Pt will continue to benefit from skilled outpatient physical therapy to address the deficits listed in the problem list box on initial evaluation, provide pt/family education and to maximize pt's level of independence in the home and community environment.     Pt's spiritual, cultural and educational needs considered and pt agreeable to plan of care and goals.     Anticipated barriers to physical therapy: prolonged break in care     Goals: Short Term Goals: 3 weeks   Pt to be Independent with HEP for increased strength, endurance and functional mobility. MET  Pt to have decreased pain 2/10 for increased functional mobility and tolerance. MET  Pt to increase AROM to 120 degrees R shoulder flexion and abduction for increased functional mobility. MET        Long Term Goals: 10 weeks   Pt to be Independent with pain management strategies and verbalize 2 for increased strength, endurance and functional mobility. progressing  Pt to have decreased pain 0/10 for increased functional mobility and tolerance. MET  Pt to increase AROM to 155 degrees R shoulder flexion and abduction for increased functional mobility MET  Pt to increase activity tolerance to 1 hrs for without increased pain for increased overall functional activity. progressing  Pt to increase R shoulder flexion, abduction and ER to 4/5 for increased functional strength and activity. progressing     Plan      Plan of care Certification: 1/7/2025 to 3/31/25 extend POC to 6/30/25     Outpatient Physical Therapy 2 times weekly for 10 weeks to include the following interventions: Cervical/Lumbar Traction, Electrical Stimulation DN, Manual Therapy, Moist Heat/ Ice, Neuromuscular Re-ed, Patient Education, Therapeutic Activities, and Therapeutic Exercise.     Roxy Cortez, PT

## 2025-06-19 ENCOUNTER — CLINICAL SUPPORT (OUTPATIENT)
Dept: REHABILITATION | Facility: HOSPITAL | Age: 70
End: 2025-06-19
Payer: MEDICARE

## 2025-06-19 DIAGNOSIS — R29.3 ABNORMAL POSTURE: ICD-10-CM

## 2025-06-19 DIAGNOSIS — M25.611 DECREASED RIGHT SHOULDER RANGE OF MOTION: Primary | ICD-10-CM

## 2025-06-19 DIAGNOSIS — R29.898 UPPER EXTREMITY WEAKNESS: ICD-10-CM

## 2025-06-19 PROCEDURE — 97112 NEUROMUSCULAR REEDUCATION: CPT | Mod: HCNC,PO,CQ

## 2025-06-19 PROCEDURE — 97530 THERAPEUTIC ACTIVITIES: CPT | Mod: HCNC,PO,CQ

## 2025-06-19 PROCEDURE — 97110 THERAPEUTIC EXERCISES: CPT | Mod: HCNC,PO,CQ

## 2025-06-19 NOTE — PROGRESS NOTES
Physical Therapy Daily Treatment Note     [facility]  [facility] Number: [id number]    Therapy Diagnosis:   Encounter Diagnoses   Name Primary?    Decreased right shoulder range of motion Yes    Abnormal posture     Upper extremity weakness        Physician: Nicole Kurtz, NP-C    Visit Date: 6/19/2025    Physician Orders: PT Eval and Treat   Medical Diagnosis from Referral: S49.90XD (ICD-10-CM) - Shoulder injury, unspecified laterality, subsequent encounter  Evaluation Date: 1/7/2025  Authorization Period Expiration: 12/31/25  Plan of Care Expiration: 3/31/25 extend POC 6/30/25  Progress Note Due: 6/15/2025  Date of Surgery: NA  Visit # / Visits authorized: 28/ 30  FOTO: 2/ 3       Time In: 2:15 pm (late arrival)  Time Out: 3:00 pm  Total Time: 45 minutes  Total Billable Time: 45 minutes      Precautions: [place]    Subjective     Pt reports: continued min R shoulder pain. He notes applying topical pain relieving cream to R shoulder prior to today's visit.    He was compliant with home exercise program.  Response to previous treatment: no adverse effects  Functional change: able to reach top of my head.    Pain: 1/10  Location: right shoulder      Objective     5/15/25  R shoulder  Standing AROM flexion : 155 ;  abduction 150 ; ER 55  AAROM flexion : 165;  abduction 155;  ER 65       Treatment      Kleber received therapeutic exercises to develop strength, endurance, ROM, flexibility, posture and core stabilization for 8 minutes including:    Upper trap stretch 3 x 30 secs   Levator stretch 3 x 30 secs   Supine stretch towel roll x 3 min vertical  Supine stretch towel roll horizontal T2-T4 level x 3 min     (Patient instructed to lift arms as needed in stretches.)    Kleber received the following manual therapy techniques: Joint mobilizations, Manual traction, and Soft tissue Mobilization were applied to the:  for 0 minutes, including:  GH traction  Cervical traction      Kleber participated in neuromuscular  re-education activities to improve: Balance, Coordination, Proprioception and Posture for 17 minutes. The following activities were include    Scapular retraction 2 x 10 hold 3 secs   No Money RTB 2 x 10   Cervical retraction 2 x 10 hold 3 secs NP  Supine HOB elevated 45 deg horizontal abduction 2 x 15 green T band   Supine HOB  elevated 45 deg diagonals  2 x 10 green Tband  Supine IR/ ER x 30 2 # weight small amplitude-  Standing ER with RTB as tolerated 3 x 10  90/90 IR YTB 2 x 10  Rows standing green Tband 3 x 10-  Shld extension red tband 2 x 10-  Serratus wall slides 1 x 10  UBE x 6 minutes (forward/backward)    Kleber participated in dynamic functional therapeutic activities to improve functional performance for 20 minutes, including:  Pulleys flex/abd x 2 minutes ea  [place] 5# dowel 3 x 15 into flexion /scaption  and 3x 10 scaption/abduction +5# ankle weight around wrist  Cabinet reach 1# 2 x 10  No weight serratus punches overhead and eccentric lowering to improve reaching in flexion and then scaption towards abduction 2 x 10 each  Unilateral lat pulldown 13# 2 x 10 to improve overhead reaching tolerance.  Palloff press 2 x 10 GTB  Serratus wall slides 2 x 10              Kleber received cold pack for 00 minutes to R shoulder.      Home Exercises Provided and Patient Education Provided     Education provided:   - HEP review    Written Home Exercises Provided: Patient instructed to cont prior HEP.  Exercises were reviewed and Kleber was able to demonstrate them prior to the end of the session.  Kleber demonstrated good  understanding of the education provided.     See EMR under Patient Instructions for exercises provided prior visit.    Assessment   Kleber continues to report only min R shoulder pain levels. Treatment was altered today by pt's late arrival time. A number of exercises were not performed due to time constraints. Pt continues to demonstrate improved functional ROM in R shoulder. Cueing still  required to decrease UT activation and to promote proper scapular mechanics. Will continue to progress per pt's tolerance.     Kleber Is progressing well towards his goals.   Pt prognosis is Good.     Pt will continue to benefit from skilled outpatient physical therapy to address the deficits listed in the problem list box on initial evaluation, provide pt/family education and to maximize pt's level of independence in the home and community environment.     Pt's spiritual, cultural and educational needs considered and pt agreeable to plan of care and goals.     Anticipated barriers to physical therapy: prolonged break in care     Goals: Short Term Goals: 3 weeks   Pt to be Independent with HEP for increased strength, endurance and functional mobility. MET  Pt to have decreased pain 2/10 for increased functional mobility and tolerance. MET  Pt to increase AROM to 120 degrees R shoulder flexion and abduction for increased functional mobility. MET        Long Term Goals: 10 weeks   Pt to be Independent with pain management strategies and verbalize 2 for increased strength, endurance and functional mobility. progressing  Pt to have decreased pain 0/10 for increased functional mobility and tolerance. MET  Pt to increase AROM to 155 degrees R shoulder flexion and abduction for increased functional mobility MET  Pt to increase activity tolerance to 1 hrs for without increased pain for increased overall functional activity. progressing  Pt to increase R shoulder flexion, abduction and ER to 4/5 for increased functional strength and activity. progressing     Plan      Plan of care Certification: 1/7/2025 to 3/31/25 extend POC to 6/30/25     Outpatient Physical Therapy 2 times weekly for 10 weeks to include the following interventions: Cervical/Lumbar Traction, Electrical Stimulation DN, Manual Therapy, Moist Heat/ Ice, Neuromuscular Re-ed, Patient Education, Therapeutic Activities, and Therapeutic Exercise.     Fabian BERMAN  Jimbo, PTA        Improved

## 2025-06-20 DIAGNOSIS — I48.0 PAROXYSMAL ATRIAL FIBRILLATION: ICD-10-CM

## 2025-06-20 RX ORDER — DRONEDARONE 400 MG/1
TABLET, FILM COATED ORAL
Qty: 60 TABLET | Refills: 2 | Status: SHIPPED | OUTPATIENT
Start: 2025-06-20

## 2025-06-26 ENCOUNTER — TELEPHONE (OUTPATIENT)
Dept: INTERNAL MEDICINE | Facility: CLINIC | Age: 70
End: 2025-06-26
Payer: MEDICARE

## 2025-06-27 ENCOUNTER — TELEPHONE (OUTPATIENT)
Dept: PODIATRY | Facility: CLINIC | Age: 70
End: 2025-06-27
Payer: MEDICARE

## 2025-06-27 ENCOUNTER — TELEPHONE (OUTPATIENT)
Dept: ELECTROPHYSIOLOGY | Facility: CLINIC | Age: 70
End: 2025-06-27
Payer: MEDICARE

## 2025-06-27 ENCOUNTER — OFFICE VISIT (OUTPATIENT)
Dept: INTERNAL MEDICINE | Facility: CLINIC | Age: 70
End: 2025-06-27
Payer: MEDICARE

## 2025-06-27 ENCOUNTER — TELEPHONE (OUTPATIENT)
Dept: PHARMACY | Facility: CLINIC | Age: 70
End: 2025-06-27
Payer: MEDICARE

## 2025-06-27 VITALS
HEART RATE: 70 BPM | WEIGHT: 181.88 LBS | DIASTOLIC BLOOD PRESSURE: 62 MMHG | OXYGEN SATURATION: 97 % | SYSTOLIC BLOOD PRESSURE: 118 MMHG | BODY MASS INDEX: 29.23 KG/M2 | HEIGHT: 66 IN

## 2025-06-27 DIAGNOSIS — T14.8XXA WOUND INFECTION: Primary | ICD-10-CM

## 2025-06-27 DIAGNOSIS — G56.02 LEFT CARPAL TUNNEL SYNDROME: ICD-10-CM

## 2025-06-27 DIAGNOSIS — E11.42 TYPE 2 DIABETES MELLITUS WITH DIABETIC POLYNEUROPATHY, WITHOUT LONG-TERM CURRENT USE OF INSULIN: ICD-10-CM

## 2025-06-27 DIAGNOSIS — L08.9 WOUND INFECTION: Primary | ICD-10-CM

## 2025-06-27 DIAGNOSIS — L97.529: Primary | ICD-10-CM

## 2025-06-27 DIAGNOSIS — I10 PRIMARY HYPERTENSION: ICD-10-CM

## 2025-06-27 DIAGNOSIS — F10.21 ALCOHOL DEPENDENCE IN REMISSION: ICD-10-CM

## 2025-06-27 PROCEDURE — 99999 PR PBB SHADOW E&M-EST. PATIENT-LVL V: CPT | Mod: PBBFAC,HCNC,,

## 2025-06-27 NOTE — TELEPHONE ENCOUNTER
Spoke to patient and patient's daughter and patient. Patient reports taking Multaq 200 mg twice daily instead of Multaq 400 mg twice daily reports feeling drain after taking. Patient reports /62 HR 70s. No symptoms of Afib. Only had to take Metoprolol once in the past week. Answered questions about ablation procedure. Will inform Dr. Angel patient has been taking Multaq differently than prescribed. Patient verbalized understanding and appreciated the call.       ----- Message from Es sent at 6/27/2025 12:10 PM CDT -----  Regarding: Procedure?'s Medication concerns  Pt [phone] daughter called in says he would like a call to ask a few questions in ref to his upcoming procedure. Also she would like for you to know that he is not taking his Multaq 400 mg corrrectly. The pt is breaking the pill in half and only taking 200 mg because he told his daughter that the 400 mg drains him and makes him don't want to get out of bed. She would like to talk to him about that.    Thanks

## 2025-06-27 NOTE — TELEPHONE ENCOUNTER
Patient's daughter currently at the doctors office with patient. Request a call back.     ----- Message from MARLEEN Sanford sent at 6/27/2025  2:42 PM CDT -----  Regarding: FW: Procedure?'s Medication concerns    ----- Message -----  From: Es Patel  Sent: 6/27/2025  12:27 PM CDT  To: Claribel Corrales RN  Subject: Procedure?'s Medication concerns                 Pt [phone] daughter called in says he would like a call to ask a few questions in ref to his upcoming procedure. Also she would like for you to know that he is not taking his Multaq 400 mg corrrectly. The pt is breaking the pill in half and only taking 200 mg because he told his daughter that the 400 mg drains him and makes him don't want to get out of bed. She would like to talk to him about that.    Thanks

## 2025-06-27 NOTE — TELEPHONE ENCOUNTER
----- Message from Nghia Weiss sent at 6/27/2025  3:33 PM CDT -----  Regarding: Skin ulcer of fourth toe of left foot  Good afternoon please assist with scheduling appointment.  Skin ulcer of fourth toe of left foot, unspecified ulcer stage [L97.529]  I tried scheduling patient but I could not. His daughter Chani would like assistant with scheduling thanks.

## 2025-06-27 NOTE — PROGRESS NOTES
INTERNAL MEDICINE URGENT CARE NOTE    CHIEF COMPLAINT     Kleber presents today for follow up of toe ulcer.    HPI     Donald [facility] is a 69 y.o. male who presents for an urgent care visit today.    TOE ULCER:  He reports a skin ulcer on the fourth digit of the left foot. Daughter reports it appears improved. The ulcer is painful, particularly in the morning. He currently uses Mupirocin topical medication.     DIABETES:  He reports blood sugar ranges between 110-160 mg/dL. He checks blood sugar regularly and his diabetes management is stable without concerns or complications.    CARPAL TUNNEL SYNDROME:  He reports worsening hand numbness and tingling compared to previous visit. 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 He experiences tingling sensations in his hands that interfere with daily comfort. Naproxen does not alleviate the numbness.    SOCIAL HISTORY:  He has a history of alcoholism but has maintained sobriety since October of last year. He reports limited local support system as previous supportive friends have passed away. He wants to move from his daughters home, for independence but daughter is hesitant, without proper support.    ROS:  General: -fever, -chills, -fatigue, -weight gain, -weight loss  Eyes: -vision changes, -redness, -discharge  ENT: -ear pain, -nasal congestion, -sore throat  Cardiovascular: -chest pain, -palpitations, -lower extremity edema  Respiratory: -cough, -shortness of breath  Gastrointestinal: -abdominal pain, -nausea, -vomiting, -diarrhea, -constipation, -blood in stool  Genitourinary: -dysuria, -hematuria, -frequency  Musculoskeletal: +joint pain, -muscle pain, +limb pain, +pain with movement  Skin: -rash, -lesion  Neurological: -headache, -dizziness, +numbness, +tingling  Psychiatric: -anxiety, -depression, -sleep difficulty        Past Medical History:  Past Medical History:   Diagnosis Date    A-fib      "Alcohol abuse     Anxiety     Arthritis     Chronic gout     Diabetes mellitus     DM (diabetes mellitus) 11/16/2016    "boarderline, not taking any meds currently"    Fatty liver     Hyperlipidemia     Renal cell cancer 2014    S/P TKR (total knee replacement), right 06/27/2019     Home Medications:  Prior to Admission medications    Medication Sig Start Date End Date Taking? Authorizing Provider   allopurinoL (ZYLOPRIM) 100 MG tablet Take 2 tablets (200 mg total) by mouth once daily. 6/5/25   Tomas Sanz MD   diclofenac sodium (VOLTAREN) 1 % Gel Apply 2 g topically 3 (three) times daily as needed (Right knee - hand pain). 10/29/23   Dayna Bai MD   metFORMIN (GLUCOPHAGE-XR) 500 MG ER 24hr tablet Take 2 tablets (1,000 mg total) by mouth 2 (two) times daily with meals.  Patient taking differently: Take 1,000 mg by mouth 2 (two) times a day. 12/26/24   Nicole Kurtz NP-C   metoprolol succinate (TOPROL-XL) 25 MG 24 hr tablet Take 1 tablet (25 mg total) by mouth daily as needed. for heart rates greater than 100 5/8/25 5/8/26  Trae Angel MD   MULTAQ 400 mg Tab TAKE 1 TABLET(400 MG) BY MOUTH TWICE DAILY WITH MEALS 6/20/25   Trae Angel MD   pantoprazole (PROTONIX) 40 MG tablet TAKE 1 TABLET(40 MG) BY MOUTH EVERY DAY 4/10/25   Bacilio Larry MD   rivaroxaban (XARELTO) 15 mg Tab Take 1 tablet (15 mg total) by mouth 2 (two) times daily with meals. 5/6/25 5/6/26  Jamel Crain MD   rOPINIRole (REQUIP) 1 MG tablet TAKE 1 TABLET(1 MG) BY MOUTH EVERY EVENING  Patient taking differently: as needed. 1/31/24   Bacilio Larry MD   rosuvastatin (CRESTOR) 20 MG tablet TAKE 1 TABLET(20 MG) BY MOUTH EVERY DAY 8/29/24   Uriel Tolentino MD   sertraline (ZOLOFT) 100 MG tablet Take 1 tablet (100 mg total) by mouth once daily. 5/23/24   Bacilio Reyes Jr., MD   tamsulosin (FLOMAX) 0.4 mg Cap Take 1 capsule (0.4 mg total) by mouth once daily. 5/15/25 5/15/26  Bacilio Larry MD   thiamine (VITAMIN B-1) " "100 MG tablet Take 1 tablet (100 mg total) by mouth once daily.  Patient not taking: Reported on 2/18/2025 11/19/24   Mehdi Ramey MD     PHYSICAL EXAM     General: No acute distress. Well-developed.   Eyes: EOMI. Sclerae anicteric.  HENT: Normocephalic. Atraumatic. Nares patent. Moist oral mucosa.  Cardiovascular: Regular rate. Regular rhythm. No murmurs. No rubs. No gallops. Normal S1, S2.     Pulses:           Popliteal pulses are 2+ on the right side.        Dorsalis pedis pulses are 2+ on the left side.        Posterior tibial pulses are 2+ on the left side.  Respiratory: Normal respiratory effort. Clear to auscultation bilaterally. No rales. No rhonchi. No wheezing.  Musculoskeletal: No  obvious deformity.  Extremities: No lower extremity edema.  Neurological: Alert & oriented x3. No slurred speech. Normal gait. Positive Phalen's test.  Psychiatric: Normal mood. Normal affect. Good insight. Good judgment.  Skin: Warm. Dry. No rash. Skin ulcer on fourth toe of right foot. See image     Left foot:      Skin integrity: Skin breakdown present. No erythema or warmth.      Toenail Condition: Left toenails are normal.                /62 (BP Location: Left arm, Patient Position: Sitting)   Pulse 70   Ht 5' 6" (1.676 m)   Wt 82.5 kg (181 lb 14.1 oz)   SpO2 97%   BMI 29.36 kg/m²     ASSESSMENT/PLAN     SKIN ULCER OF TOE  - Monitored ulcer on the patient's fourth toe of left foot, described as previously concave   - Noted improvement but ongoing healing process.  - Continue mupirocin ointment 2 x daily, covering with band-aid during the day.  - Referred to podiatry for diabetic foot care   - Ambulatory referral/consult to Podiatry; Future; Expected date: 07/04/2025    TYPE 2 DIABETES MELLITUS WITH DIABETIC POLYNEUROPATHY, WITHOUT LONG-TERM CURRENT USE OF INSULIN:  - Monitored blood sugar (range 110-150, 60), indicating adequate diabetes management.  - Discussed importance of proper foot care, " emphasizing risks of self-treatment and dangers of cutting into diabetic feet due to reduced sensation.  - Referred to podiatry for diabetic foot care     LEFT CARPAL TUNNEL SYNDROME:  - Monitored worsening numbness and tingling in hands since last visit  - Diagnosed carpal tunnel syndrome based on reported symptoms and positive Phalen's test.  - Recommend wrist splinting, activity modification, stretching exercises, and using ice or heat for relief as preferred.    ALCOHOL DEPENDENCE IN REMISSION:  - Monitored the patient's sobriety status, noting continuous sobriety since October last year, following rehabilitation.  - Kleber shows good progress in remission from alcohol dependence.    I spent a total of 34 minutes on the day of the visit.This includes face to face time and non-face to face time preparing to see the patient (eg, review of tests), obtaining and/or reviewing separately obtained history, documenting clinical information in the electronic or other health record, independently interpreting results and communicating results to the patient/family/caregiver, or care coordinator.     Patient education provided from [facility]. Patient was counseled on when and how to seek emergent care.   This note was generated with the assistance of ambient listening technology. Verbal consent was obtained by the patient and accompanying visitor(s) for the recording of patient appointment to facilitate this note. I attest to having reviewed and edited the generated note for accuracy, though some syntax or spelling errors may persist. Please contact the author of this note for any clarification.       Nicole QUAN, APRN, FNP-c   Department of Internal Medicine - [facility]  2:41 PM

## 2025-06-27 NOTE — TELEPHONE ENCOUNTER
[facility]/Population Health Chart Review & Patient Outreach Details For Medication Adherence Project    Reason for Outreach Encounter: 3rd Party payor non-compliance report ([employer], [employer], [employer], etc)  2.  Patient Outreach Method: Reviewed patient chart   3.   Medication in question:    Hyperlipidemia Medications              rosuvastatin (CRESTOR) 20 MG tablet TAKE 1 TABLET(20 MG) BY MOUTH EVERY DAY                  rosuvastatin  last filled  5/7 for 90 day supply      4.  Reviewed and or Updates Made To: Patient Chart  5. Outreach Outcomes and/or actions taken: Patient filled medication and is on track to be adherent  Additional Notes:

## 2025-07-02 DIAGNOSIS — E11.9 TYPE 2 DIABETES MELLITUS WITHOUT COMPLICATION: ICD-10-CM

## 2025-07-03 ENCOUNTER — TELEPHONE (OUTPATIENT)
Dept: ENDOSCOPY | Facility: HOSPITAL | Age: 70
End: 2025-07-03
Payer: MEDICARE

## 2025-07-03 ENCOUNTER — TELEPHONE (OUTPATIENT)
Dept: ELECTROPHYSIOLOGY | Facility: CLINIC | Age: 70
End: 2025-07-03
Payer: MEDICARE

## 2025-07-03 NOTE — TELEPHONE ENCOUNTER
"Spoke with patient. Informed patient Dr. Angel okay with patient taking Multaq 200 mg twice a day. Patient verbalized understanding and appreciated the call.       ----- Message from Trae Angel MD sent at 6/30/2025  4:05 PM CDT -----  Yes ok thanks  ----- Message -----  From: Abel Teresa RN  Sent: 6/30/2025   1:45 PM CDT  To: Trae Angel MD    Patient hx of Afib.AFL, BRYON thrombus (5/6/2025),     Scheduled for CTI/PVI Ablation/ Watchman on 7/28/25    Patient on Multaq 400 mg twice daily; patient on his own started taking Multaq 200 mg twice daily because Multau 400 mg BID made per patient "feel drained after words" . Reports improvement in symptoms with the adjustment.     Patient reports HR 70s; blood pressure 118/70s    Current Meds: Multaq 400 mg BID ; Metoprolol 25 mg as needed (pt reported taking it once a couple weeks ago), Xarelto 15 mg BID     Is it okay for patient to continue to take Multaq 200 mg twice daily?     ThanksAbel RN  "

## 2025-07-03 NOTE — TELEPHONE ENCOUNTER
Contacted patient to schedule colonoscopy. Spoke with his daughter Chani who states patient has an upcoming procedure in July which they want to focus on. States she will call us back. # [phone] or [phone] given to call to get procedure scheduled. Understanding verbalized.

## 2025-07-14 ENCOUNTER — TELEPHONE (OUTPATIENT)
Dept: INTERNAL MEDICINE | Facility: CLINIC | Age: 70
End: 2025-07-14
Payer: MEDICARE

## 2025-07-14 NOTE — TELEPHONE ENCOUNTER
Copied from CRM #[id number]. Topic: General Inquiry - Patient Advice  >> Jul 14, 2025  2:44 PM Corina wrote:  .1MEDICALADVICE     Patient is calling for Medical Advice regarding: Pts daughter is calling in regards to give an update on the pts health. Pt would like to speak to only Dr Larry. Please call and advise. Thank you    How long has patient had these symptoms:N/A    Pharmacy name and phone#:N/A    Patient wants a call back or thru [facility], provide patient's call back phone number: [phone] Patient    Comments:    Please advise patient replies from provider may take up to 48 hours.

## 2025-07-14 NOTE — TELEPHONE ENCOUNTER
Pt's daughter Chani called to let us know that patient will be going to be moving back home alone and pt's daughter would like to speak with you regarding the transition and give you further update [phone]

## 2025-07-15 ENCOUNTER — TELEPHONE (OUTPATIENT)
Dept: RHEUMATOLOGY | Facility: CLINIC | Age: 70
End: 2025-07-15
Payer: MEDICARE

## 2025-07-15 NOTE — TELEPHONE ENCOUNTER
Called pt to reschedule appointment due to provider being out of clinic at this day/time. No answer, vm left.     Render Risk Assessment In Note?: no Comment: Graft is flesh colored Mild superficial crusting along superior and inferior edges. All sutures in place. No Erythema or drainage. Detail Level: Zone

## 2025-07-15 NOTE — TELEPHONE ENCOUNTER
Spoke with pt daughter to reschedule appt due to provider being out of clinic. Appt r/s to 8/15 at 11am.

## 2025-07-20 ENCOUNTER — PATIENT MESSAGE (OUTPATIENT)
Dept: ELECTROPHYSIOLOGY | Facility: CLINIC | Age: 70
End: 2025-07-20
Payer: MEDICARE

## 2025-07-21 ENCOUNTER — TELEPHONE (OUTPATIENT)
Dept: ELECTROPHYSIOLOGY | Facility: CLINIC | Age: 70
End: 2025-07-21
Payer: MEDICARE

## 2025-07-21 ENCOUNTER — LAB VISIT (OUTPATIENT)
Dept: LAB | Facility: HOSPITAL | Age: 70
End: 2025-07-21
Attending: INTERNAL MEDICINE
Payer: MEDICARE

## 2025-07-21 DIAGNOSIS — I48.3 TYPICAL ATRIAL FLUTTER: ICD-10-CM

## 2025-07-21 DIAGNOSIS — Z79.01 ON ANTICOAGULANT THERAPY: ICD-10-CM

## 2025-07-21 DIAGNOSIS — E66.01 SEVERE OBESITY (BMI 35.0-39.9) WITH COMORBIDITY: ICD-10-CM

## 2025-07-21 DIAGNOSIS — I50.22 HEART FAILURE WITH MILDLY REDUCED EJECTION FRACTION (HFMREF): ICD-10-CM

## 2025-07-21 DIAGNOSIS — Z01.818 PRE-OP TESTING: ICD-10-CM

## 2025-07-21 DIAGNOSIS — I10 PRIMARY HYPERTENSION: ICD-10-CM

## 2025-07-21 DIAGNOSIS — I48.0 PAROXYSMAL ATRIAL FIBRILLATION: ICD-10-CM

## 2025-07-21 LAB
ANION GAP (OHS): 7 MMOL/L (ref 8–16)
APTT PPP: 32.4 SECONDS (ref 21–32)
BUN SERPL-MCNC: 9 MG/DL (ref 8–23)
CALCIUM SERPL-MCNC: 9.1 MG/DL (ref 8.7–10.5)
CHLORIDE SERPL-SCNC: 103 MMOL/L (ref 95–110)
CO2 SERPL-SCNC: 27 MMOL/L (ref 23–29)
CREAT SERPL-MCNC: 1.1 MG/DL (ref 0.5–1.4)
ERYTHROCYTE [DISTWIDTH] IN BLOOD BY AUTOMATED COUNT: 14.8 % (ref 11.5–14.5)
GFR SERPLBLD CREATININE-BSD FMLA CKD-EPI: >60 ML/MIN/1.73/M2
GLUCOSE SERPL-MCNC: 105 MG/DL (ref 70–110)
HCT VFR BLD AUTO: 39.1 % (ref 40–54)
HGB BLD-MCNC: 12.8 GM/DL (ref 14–18)
INDIRECT COOMBS: NORMAL
INR PPP: 1.2 (ref 0.8–1.2)
MCH RBC QN AUTO: 29.6 PG (ref 27–31)
MCHC RBC AUTO-ENTMCNC: 32.7 G/DL (ref 32–36)
MCV RBC AUTO: 91 FL (ref 82–98)
PLATELET # BLD AUTO: 149 K/UL (ref 150–450)
PMV BLD AUTO: 8.9 FL (ref 9.2–12.9)
POTASSIUM SERPL-SCNC: 4.7 MMOL/L (ref 3.5–5.1)
PROTHROMBIN TIME: 12.9 SECONDS (ref 9–12.5)
RBC # BLD AUTO: 4.32 M/UL (ref 4.6–6.2)
RH BLD: NORMAL
SODIUM SERPL-SCNC: 137 MMOL/L (ref 136–145)
SPECIMEN OUTDATE: NORMAL
WBC # BLD AUTO: 7.21 K/UL (ref 3.9–12.7)

## 2025-07-21 PROCEDURE — 85027 COMPLETE CBC AUTOMATED: CPT

## 2025-07-21 PROCEDURE — 86850 RBC ANTIBODY SCREEN: CPT | Performed by: INTERNAL MEDICINE

## 2025-07-21 PROCEDURE — 82310 ASSAY OF CALCIUM: CPT

## 2025-07-21 PROCEDURE — 85730 THROMBOPLASTIN TIME PARTIAL: CPT

## 2025-07-21 PROCEDURE — 85610 PROTHROMBIN TIME: CPT

## 2025-07-21 PROCEDURE — 36415 COLL VENOUS BLD VENIPUNCTURE: CPT

## 2025-07-21 NOTE — TELEPHONE ENCOUNTER
Patient daughter reports. Patient has missed a couple of doses of Xarelto last week. Patient also missed taking his Multaq prescription. Patient daughter reports patient has history of alcohol abuse and pills and has currently moved back home. Advised patient daughter to encourage patient to take medication as prescribed. Patient daughter unable to provide patient's HR or blood pressure. Will contact patient to assess current needs prior to procedure.     ----- Message from Yasmin sent at 7/21/2025  2:53 PM CDT -----  Regarding: return call  Pt's daughter Chani is returning your call and can be reached at [phone].    Thank you

## 2025-07-22 ENCOUNTER — OFFICE VISIT (OUTPATIENT)
Dept: PODIATRY | Facility: CLINIC | Age: 70
End: 2025-07-22
Payer: MEDICARE

## 2025-07-22 VITALS
BODY MASS INDEX: 29.35 KG/M2 | DIASTOLIC BLOOD PRESSURE: 71 MMHG | HEIGHT: 66 IN | HEART RATE: 62 BPM | SYSTOLIC BLOOD PRESSURE: 131 MMHG | WEIGHT: 182.63 LBS

## 2025-07-22 DIAGNOSIS — E11.9 TYPE 2 DIABETES MELLITUS WITHOUT COMPLICATION, WITHOUT LONG-TERM CURRENT USE OF INSULIN: Primary | Chronic | ICD-10-CM

## 2025-07-22 DIAGNOSIS — M79.671 PAIN IN BOTH FEET: ICD-10-CM

## 2025-07-22 DIAGNOSIS — L97.529: ICD-10-CM

## 2025-07-22 DIAGNOSIS — B35.1 ONYCHOMYCOSIS: ICD-10-CM

## 2025-07-22 DIAGNOSIS — L84 CORN OR CALLUS: ICD-10-CM

## 2025-07-22 DIAGNOSIS — M79.672 PAIN IN BOTH FEET: ICD-10-CM

## 2025-07-22 PROCEDURE — 99999 PR PBB SHADOW E&M-EST. PATIENT-LVL III: CPT | Mod: PBBFAC,,, | Performed by: PODIATRIST

## 2025-07-23 ENCOUNTER — TELEPHONE (OUTPATIENT)
Dept: ELECTROPHYSIOLOGY | Facility: CLINIC | Age: 70
End: 2025-07-23
Payer: MEDICARE

## 2025-07-24 ENCOUNTER — TELEPHONE (OUTPATIENT)
Dept: ELECTROPHYSIOLOGY | Facility: CLINIC | Age: 70
End: 2025-07-24
Payer: MEDICARE

## 2025-07-24 NOTE — TELEPHONE ENCOUNTER
Spoke to patient. Patient reports resuming Multaq and Xarelto as prescribed from missing two doses last week. Reports HR 70-80s since resuming. Patient reports no symptoms and feeling okay. Continued with preop call.     CONFIRMED procedure arrival time of 5:15 AM on 7/28/2025    Reiterated instructions including:  -Directions to check in desk  -NPO after midnight night prior to procedure  -High importance of HOLDING Metformin and Xarelto the morning of the procedure. Patient verbalized understanding.   -Confirmed that patient does not take GLP-1 Agonist.  -Confirmed compliance of Xarelto. Patient reported missing two doses of xarelto last week but has resumed as prescribed. Patient to hold Xarelto the morning of the procedure.   -Pre-procedure LABS Reviewed.   -Confirmed absence or presence of implanted device/stimulator   -Confirmed no fever, cough, in the past 30 days. Patient reports chronic shortness of breath intermittently.   -Confirmed no redness, rash, irritation, or yeast infection to groin area.   -Reviewed current visitor policy    Patient verbalized understanding of above and appreciated the call.     ----- Message from Nghia Franco sent at 7/24/2025  2:57 PM CDT -----    ----- Message -----  From: Ana Gandhi  Sent: 7/24/2025   2:20 PM CDT  To: [facility] SOLOM[facility] Staff    Patient returning call. Please call back @ [phone]. Thank you Ana

## 2025-07-25 ENCOUNTER — TELEPHONE (OUTPATIENT)
Dept: ELECTROPHYSIOLOGY | Facility: CLINIC | Age: 70
End: 2025-07-25
Payer: MEDICARE

## 2025-07-25 NOTE — TELEPHONE ENCOUNTER
Spoke to patient's daughter (Chani). Patient called yesterday and did pre op call with patient.     CONFIRMED procedure arrival time of 5:15 AM on 7/28/2025    Reiterated instructions including:  -Directions to check in desk  -NPO after midnight night prior to procedure  -High importance of HOLDING Metformin and Xarelto the the morning of the procedure. Patient's daughter verbalized understanding.   -Confirmed that patient does not take GLP-1 Agonist.  -Reviewed current visitor policy    Patient verbalized understanding of above and appreciated the call.     ----- Message from MARLEEN Sanford sent at 7/25/2025 10:16 AM CDT -----  Regarding: FW: Instructions    ----- Message -----  From: Liyah Solorzano MA  Sent: 7/25/2025  10:12 AM CDT  To: Claribel Corrales RN  Subject: FW: Instructions                                   ----- Message -----  From: Ann Jones MA  Sent: 7/25/2025   9:53 AM CDT  To: [facility] Staff  Subject: Instructions                                     Pt's daughter, Chani, is calling, wanting to know which medications the pt can take before his procedure on Monday. Please call back at [phone].    Thank  you.

## 2025-07-27 ENCOUNTER — ANESTHESIA EVENT (OUTPATIENT)
Dept: MEDSURG UNIT | Facility: HOSPITAL | Age: 70
End: 2025-07-27
Payer: MEDICARE

## 2025-07-28 ENCOUNTER — PATIENT MESSAGE (OUTPATIENT)
Dept: INTERNAL MEDICINE | Facility: CLINIC | Age: 70
End: 2025-07-28
Payer: MEDICARE

## 2025-07-28 ENCOUNTER — HOSPITAL ENCOUNTER (INPATIENT)
Facility: HOSPITAL | Age: 70
LOS: 1 days | Discharge: HOME | DRG: 310 | End: 2025-07-28
Attending: INTERNAL MEDICINE | Admitting: INTERNAL MEDICINE
Payer: MEDICARE

## 2025-07-28 ENCOUNTER — ANESTHESIA (OUTPATIENT)
Dept: MEDSURG UNIT | Facility: HOSPITAL | Age: 70
End: 2025-07-28
Payer: MEDICARE

## 2025-07-28 ENCOUNTER — CLINICAL SUPPORT (OUTPATIENT)
Dept: CARDIOLOGY | Facility: HOSPITAL | Age: 70
End: 2025-07-28
Attending: INTERNAL MEDICINE
Payer: MEDICARE

## 2025-07-28 VITALS
HEIGHT: 66 IN | BODY MASS INDEX: 28.93 KG/M2 | RESPIRATION RATE: 18 BRPM | OXYGEN SATURATION: 99 % | TEMPERATURE: 98 F | DIASTOLIC BLOOD PRESSURE: 58 MMHG | HEART RATE: 70 BPM | SYSTOLIC BLOOD PRESSURE: 124 MMHG | WEIGHT: 180 LBS

## 2025-07-28 DIAGNOSIS — I48.0 PAROXYSMAL ATRIAL FIBRILLATION: ICD-10-CM

## 2025-07-28 DIAGNOSIS — I48.3 TYPICAL ATRIAL FLUTTER: ICD-10-CM

## 2025-07-28 DIAGNOSIS — I50.22 CHRONIC HFREF (HEART FAILURE WITH REDUCED EJECTION FRACTION): ICD-10-CM

## 2025-07-28 DIAGNOSIS — R29.6 FALLS FREQUENTLY: ICD-10-CM

## 2025-07-28 DIAGNOSIS — I48.91 ATRIAL FIBRILLATION: ICD-10-CM

## 2025-07-28 DIAGNOSIS — Z51.81 ENCOUNTER FOR MONITORING COUMADIN THERAPY: ICD-10-CM

## 2025-07-28 DIAGNOSIS — Z87.828 HISTORY OF SUBDURAL HEMATOMA (POST TRAUMATIC): ICD-10-CM

## 2025-07-28 DIAGNOSIS — I49.9 ARRHYTHMIA: ICD-10-CM

## 2025-07-28 DIAGNOSIS — Z79.01 ENCOUNTER FOR MONITORING COUMADIN THERAPY: ICD-10-CM

## 2025-07-28 DIAGNOSIS — I48.0 PAROXYSMAL ATRIAL FIBRILLATION: Primary | ICD-10-CM

## 2025-07-28 LAB
INDIRECT COOMBS: NORMAL
OHS CV CPX PATIENT HEIGHT IN: 66
OHS QRS DURATION: 84 MS
OHS QTC CALCULATION: 440 MS
POC ACTIVATED CLOTTING TIME K: 135 SEC (ref 74–137)
POCT GLUCOSE: 142 MG/DL (ref 70–110)
RH BLD: NORMAL
SAMPLE: NORMAL
SPECIMEN OUTDATE: NORMAL

## 2025-07-28 PROCEDURE — 86920 COMPATIBILITY TEST SPIN: CPT | Mod: HCNC | Performed by: INTERNAL MEDICINE

## 2025-07-28 PROCEDURE — 85347 COAGULATION TIME ACTIVATED: CPT | Mod: HCNC | Performed by: INTERNAL MEDICINE

## 2025-07-28 PROCEDURE — 99499 UNLISTED E&M SERVICE: CPT | Mod: ,,, | Performed by: INTERNAL MEDICINE

## 2025-07-28 PROCEDURE — 37000009 HC ANESTHESIA EA ADD 15 MINS: Mod: HCNC | Performed by: INTERNAL MEDICINE

## 2025-07-28 PROCEDURE — 93010 ELECTROCARDIOGRAM REPORT: CPT | Mod: HCNC,,, | Performed by: INTERNAL MEDICINE

## 2025-07-28 PROCEDURE — 37000008 HC ANESTHESIA 1ST 15 MINUTES: Mod: HCNC | Performed by: INTERNAL MEDICINE

## 2025-07-28 PROCEDURE — 94761 N-INVAS EAR/PLS OXIMETRY MLT: CPT

## 2025-07-28 PROCEDURE — 11000001 HC ACUTE MED/SURG PRIVATE ROOM: Mod: HCNC

## 2025-07-28 PROCEDURE — 93005 ELECTROCARDIOGRAM TRACING: CPT

## 2025-07-28 PROCEDURE — 27201037 HC PRESSURE MONITORING SET UP

## 2025-07-28 PROCEDURE — 63600175 PHARM REV CODE 636 W HCPCS: Mod: HCNC | Performed by: STUDENT IN AN ORGANIZED HEALTH CARE EDUCATION/TRAINING PROGRAM

## 2025-07-28 PROCEDURE — 25000003 PHARM REV CODE 250: Mod: HCNC | Performed by: STUDENT IN AN ORGANIZED HEALTH CARE EDUCATION/TRAINING PROGRAM

## 2025-07-28 PROCEDURE — B24BZZ4 ULTRASONOGRAPHY OF HEART WITH AORTA, TRANSESOPHAGEAL: ICD-10-PCS | Performed by: INTERNAL MEDICINE

## 2025-07-28 PROCEDURE — 86901 BLOOD TYPING SEROLOGIC RH(D): CPT | Mod: HCNC | Performed by: NURSE PRACTITIONER

## 2025-07-28 RX ORDER — FENTANYL CITRATE 50 UG/ML
INJECTION, SOLUTION INTRAMUSCULAR; INTRAVENOUS
Status: DISCONTINUED | OUTPATIENT
Start: 2025-07-28 | End: 2025-07-28

## 2025-07-28 RX ORDER — SODIUM CHLORIDE 0.9 % (FLUSH) 0.9 %
10 SYRINGE (ML) INJECTION
Status: DISCONTINUED | OUTPATIENT
Start: 2025-07-28 | End: 2025-07-28 | Stop reason: HOSPADM

## 2025-07-28 RX ORDER — HYDROCODONE BITARTRATE AND ACETAMINOPHEN 500; 5 MG/1; MG/1
TABLET ORAL
Status: DISCONTINUED | OUTPATIENT
Start: 2025-07-28 | End: 2025-07-28 | Stop reason: HOSPADM

## 2025-07-28 RX ORDER — PHENYLEPHRINE HYDROCHLORIDE 10 MG/ML
INJECTION INTRAVENOUS
Status: DISCONTINUED | OUTPATIENT
Start: 2025-07-28 | End: 2025-07-28

## 2025-07-28 RX ORDER — METOPROLOL SUCCINATE 25 MG/1
12.5 TABLET, EXTENDED RELEASE ORAL DAILY
Qty: 45 TABLET | Refills: 3 | Status: SHIPPED | OUTPATIENT
Start: 2025-07-28 | End: 2026-07-28

## 2025-07-28 RX ORDER — LIDOCAINE HYDROCHLORIDE 20 MG/ML
INJECTION INTRAVENOUS
Status: DISCONTINUED | OUTPATIENT
Start: 2025-07-28 | End: 2025-07-28

## 2025-07-28 RX ORDER — PROPOFOL 10 MG/ML
VIAL (ML) INTRAVENOUS
Status: DISCONTINUED | OUTPATIENT
Start: 2025-07-28 | End: 2025-07-28

## 2025-07-28 RX ORDER — ONDANSETRON HYDROCHLORIDE 2 MG/ML
INJECTION, SOLUTION INTRAVENOUS
Status: DISCONTINUED | OUTPATIENT
Start: 2025-07-28 | End: 2025-07-28

## 2025-07-28 RX ORDER — ROCURONIUM BROMIDE 10 MG/ML
INJECTION, SOLUTION INTRAVENOUS
Status: DISCONTINUED | OUTPATIENT
Start: 2025-07-28 | End: 2025-07-28

## 2025-07-28 RX ORDER — CEFAZOLIN 2 G/1
2 INJECTION, POWDER, FOR SOLUTION INTRAMUSCULAR; INTRAVENOUS
Status: DISCONTINUED | OUTPATIENT
Start: 2025-07-28 | End: 2025-07-28 | Stop reason: HOSPADM

## 2025-07-28 RX ORDER — FENTANYL CITRATE 50 UG/ML
25 INJECTION, SOLUTION INTRAMUSCULAR; INTRAVENOUS EVERY 5 MIN PRN
Status: DISCONTINUED | OUTPATIENT
Start: 2025-07-28 | End: 2025-07-28 | Stop reason: HOSPADM

## 2025-07-28 RX ORDER — GLUCAGON 1 MG
1 KIT INJECTION
Status: DISCONTINUED | OUTPATIENT
Start: 2025-07-28 | End: 2025-07-28 | Stop reason: HOSPADM

## 2025-07-28 RX ORDER — VASOPRESSIN 20 [USP'U]/ML
INJECTION, SOLUTION INTRAMUSCULAR; SUBCUTANEOUS
Status: DISCONTINUED | OUTPATIENT
Start: 2025-07-28 | End: 2025-07-28

## 2025-07-28 RX ORDER — HALOPERIDOL LACTATE 5 MG/ML
0.5 INJECTION, SOLUTION INTRAMUSCULAR EVERY 10 MIN PRN
Status: DISCONTINUED | OUTPATIENT
Start: 2025-07-28 | End: 2025-07-28 | Stop reason: HOSPADM

## 2025-07-28 RX ADMIN — LIDOCAINE HYDROCHLORIDE 100 MG: 20 INJECTION INTRAVENOUS at 07:07

## 2025-07-28 RX ADMIN — PROPOFOL 100 MG: 10 INJECTION, EMULSION INTRAVENOUS at 07:07

## 2025-07-28 RX ADMIN — SODIUM CHLORIDE: 9 INJECTION, SOLUTION INTRAVENOUS at 07:07

## 2025-07-28 RX ADMIN — VASOPRESSIN 1 UNITS: 20 INJECTION INTRAVENOUS at 07:07

## 2025-07-28 RX ADMIN — PHENYLEPHRINE HYDROCHLORIDE 200 MCG: 10 INJECTION INTRAVENOUS at 07:07

## 2025-07-28 RX ADMIN — GLYCOPYRROLATE 0.2 MG: 0.2 INJECTION, SOLUTION INTRAMUSCULAR; INTRAVENOUS at 07:07

## 2025-07-28 RX ADMIN — PHENYLEPHRINE HYDROCHLORIDE 0.4 MCG/KG/MIN: 10 INJECTION INTRAVENOUS at 07:07

## 2025-07-28 RX ADMIN — SUGAMMADEX 200 MG: 100 INJECTION, SOLUTION INTRAVENOUS at 08:07

## 2025-07-28 RX ADMIN — ROCURONIUM BROMIDE 50 MG: 10 INJECTION, SOLUTION INTRAVENOUS at 07:07

## 2025-07-28 RX ADMIN — FENTANYL CITRATE 100 MCG: 50 INJECTION, SOLUTION INTRAMUSCULAR; INTRAVENOUS at 07:07

## 2025-07-28 RX ADMIN — ONDANSETRON 4 MG: 2 INJECTION INTRAMUSCULAR; INTRAVENOUS at 08:07

## 2025-07-28 RX ADMIN — VASOPRESSIN 2 UNITS: 20 INJECTION INTRAVENOUS at 07:07

## 2025-07-28 NOTE — ANESTHESIA PROCEDURE NOTES
Intubation    Date/Time: 7/28/2025 7:27 AM    Performed by: Annetta Lara CRNA  Authorized by: Rashad Mathew MD    Intubation:     Induction:  Intravenous    Intubated:  Postinduction    Mask Ventilation:  2 handed mask ventilation with 2 providers    Attempts:  1    Attempted By:  CRNA    Method of Intubation:  Video laryngoscopy    Blade:  Lehman 3    Laryngeal View Grade: Grade I - full view of cords      Difficult Airway Encountered?: No      Complications:  None    Airway Device:  Oral endotracheal tube    Airway Device Size:  7.5    Style/Cuff Inflation:  Cuffed (inflated to minimal occlusive pressure)    Tube secured:  23    Secured at:  The lips    Placement Verified By:  Capnometry    Complicating Factors:  None    Findings Post-Intubation:  BS equal bilateral and atraumatic/condition of teeth unchanged

## 2025-07-28 NOTE — SUBJECTIVE & OBJECTIVE
"Past Medical History:   Diagnosis Date    A-fib     Alcohol abuse     Anticoagulant long-term use     Anxiety     Arthritis     Atrial flutter     Chronic gout     Diabetes mellitus     DM (diabetes mellitus) 11/16/2016    "boarderline, not taking any meds currently"    Fatty liver     Hyperlipidemia     Renal cell cancer 2014    S/P TKR (total knee replacement), right 06/27/2019    Stroke        Past Surgical History:   Procedure Laterality Date    ABSCESS DRAINAGE      perirectal    COLONOSCOPY      ENDOSCOPIC ULTRASOUND OF UPPER GASTROINTESTINAL TRACT N/A 6/11/2024    Procedure: ULTRASOUND, UPPER GI TRACT, ENDOSCOPIC;  Surgeon: Mode Wood MD;  Location: [facility];  Service: Endoscopy;  Laterality: N/A;  6/7 portal-EUS in 7-10 days please, [facility] [place]-eliquis approved  Te 6/7-tt  6/10-precall complete-MS    ENDOSCOPIC ULTRASOUND OF UPPER GASTROINTESTINAL TRACT N/A 2/7/2025    Procedure: ULTRASOUND, UPPER GI TRACT, ENDOSCOPIC;  Surgeon: Mode Wood MD;  Location: [facility] (2ND FLR);  Service: Endoscopy;  Laterality: N/A;  1/11 portal-eliquis approved TE 1/11 Dr. Tolentino-Needs an eGD and Eus with me.suhas-tt  1/31 - attempted precall LVM - Bud  2/4 SWP'S DAUGHTER. PRECALL COMPLETE-RT    ESOPHAGOGASTRODUODENOSCOPY N/A 2/7/2025    Procedure: EGD (ESOPHAGOGASTRODUODENOSCOPY);  Surgeon: Mode Wood MD;  Location: [facility] (2ND FLR);  Service: Endoscopy;  Laterality: N/A;    HAND SURGERY Left     JOINT REPLACEMENT      knee replacement right knee    KIDNEY SURGERY      partial left kidney removal - CA    PARTIAL NEPHRECTOMY Left August 2014    PERCUTANEOUS CRYOTHERAPY OF PERIPHERAL NERVE USING LIQUID NITROUS OXIDE IN CLOSED NEEDLE DEVICE Right 6/17/2019    Procedure: CRYOTHERAPY, NERVE, PERIPHERAL, PERCUTANEOUS, USING LIQUID NITROUS OXIDE IN CLOSED NEEDLE DEVICE-right knee iovera;  Surgeon: Donny Hair III, MD;  Location: [facility] CATH LAB;  Service: Pain Management;  Laterality: Right; "    TOTAL KNEE ARTHROPLASTY Right 6/27/2019    Procedure: ARTHROPLASTY, KNEE, TOTAL-SAME DAY;  Surgeon: Mikael Huerta MD;  Location: [facility] OR [address] [place] [address];  Service: Orthopedics;  Laterality: Right;       Review of patient's allergies indicates:   Allergen Reactions    No known drug allergies        No current facility-administered medications on file prior to encounter.     Current Outpatient Medications on File Prior to Encounter   Medication Sig    diclofenac sodium (VOLTAREN) 1 % Gel Apply 2 g topically 3 (three) times daily as needed (Right knee - hand pain).    metFORMIN (GLUCOPHAGE-XR) 500 MG ER 24hr tablet Take 2 tablets (1,000 mg total) by mouth 2 (two) times daily with meals.    pantoprazole (PROTONIX) 40 MG tablet TAKE 1 TABLET(40 MG) BY MOUTH EVERY DAY    rivaroxaban (XARELTO) 15 mg Tab Take 1 tablet (15 mg total) by mouth 2 (two) times daily with meals.    rOPINIRole (REQUIP) 1 MG tablet TAKE 1 TABLET(1 MG) BY MOUTH EVERY EVENING    rosuvastatin (CRESTOR) 20 MG tablet TAKE 1 TABLET(20 MG) BY MOUTH EVERY DAY    sertraline (ZOLOFT) 100 MG tablet Take 1 tablet (100 mg total) by mouth once daily.    thiamine (VITAMIN B-1) 100 MG tablet Take 1 tablet (100 mg total) by mouth once daily. (Patient not taking: Reported on 2/18/2025)     Family History       Problem Relation (Age of Onset)    Alcohol abuse Brother    Alzheimer's disease Mother, Brother    Cancer Father, Sister    Colon cancer Father    Colon polyps Brother    Diabetes Mother    Lung cancer Father, Sister          Tobacco Use    Smoking status: Never    Smokeless tobacco: Never   Substance and Sexual Activity    Alcohol use: Not Currently     Alcohol/week: 168.0 standard drinks of alcohol     Types: 168 Cans of beer per week     Comment: no ETOH x 7 months    Drug use: Not Currently     Types: Marijuana    Sexual activity: Not Currently     Partners: Female     Review of Systems   Constitutional: Negative for chills, fever and malaise/fatigue.    HENT:  Negative for congestion and nosebleeds.    Eyes:  Negative for blurred vision.   Cardiovascular:  Positive for dyspnea on exertion. Negative for chest pain, irregular heartbeat, leg swelling, near-syncope, orthopnea, palpitations, paroxysmal nocturnal dyspnea and syncope.   Respiratory:  Negative for cough, hemoptysis, shortness of breath, sleep disturbances due to breathing, sputum production and wheezing.    Endocrine: Negative for polyphagia.   Hematologic/Lymphatic: Negative for bleeding problem. Does not bruise/bleed easily.   Skin:  Negative for itching and rash.   Musculoskeletal:  Negative for back pain, joint swelling, muscle cramps and muscle weakness.   Gastrointestinal:  Negative for bloating, abdominal pain, hematemesis, hematochezia, nausea and vomiting.   Genitourinary:  Negative for dysuria and hematuria.   Neurological:  Negative for dizziness, focal weakness, headaches, light-headedness, loss of balance, numbness and weakness.   Psychiatric/Behavioral:  Negative for altered mental status.      Objective:     Vital Signs (Most Recent):  Temp: 98.8 °F (37.1 °C) (07/28/25 0551)  Pulse: 87 (07/28/25 0551)  Resp: 18 (07/28/25 0551)  BP: 102/61 (07/28/25 0553)  SpO2: 97 % (07/28/25 0551) Vital Signs (24h Range):  Temp:  [98.8 °F (37.1 °C)] 98.8 °F (37.1 °C)  Pulse:  [87] 87  Resp:  [18] 18  SpO2:  [97 %] 97 %  BP: ()/(61-67) 102/61       Weight: 81.6 kg (180 lb)  Body mass index is 29.05 kg/m².    SpO2: 97 %        Physical Exam  Vitals and nursing note reviewed.   Constitutional:       General: He is not in acute distress.     Appearance: Normal appearance. He is well-developed. He is not diaphoretic.   HENT:      Head: Normocephalic and atraumatic.      Mouth/Throat:      Mouth: Mucous membranes are moist.      Pharynx: No oropharyngeal exudate.   Eyes:      Conjunctiva/sclera: Conjunctivae normal.      Pupils: Pupils are equal, round, and reactive to light.   Cardiovascular:      Rate  and Rhythm: Normal rate. Rhythm irregular.      Pulses: Normal pulses and intact distal pulses.           Radial pulses are 2+ on the right side and 2+ on the left side.        Dorsalis pedis pulses are 2+ on the right side and 2+ on the left side.      Heart sounds: Normal heart sounds.   Pulmonary:      Effort: Pulmonary effort is normal. No accessory muscle usage or respiratory distress.      Breath sounds: Normal breath sounds. No decreased breath sounds, wheezing, rhonchi or rales.   Chest:      Chest wall: No tenderness.   Abdominal:      General: Bowel sounds are normal. There is no distension.      Palpations: Abdomen is soft.      Tenderness: There is no abdominal tenderness. There is no guarding.   Musculoskeletal:         General: Normal range of motion.      Cervical back: Normal range of motion and neck supple.   Skin:     General: Skin is warm and dry.      Findings: No erythema or rash.   Neurological:      Mental Status: He is alert and oriented to person, place, and time. He is not disoriented.      Sensory: No sensory deficit.      Motor: No abnormal muscle tone.      Coordination: Coordination normal.      Gait: Gait normal.   Psychiatric:         Mood and Affect: Mood normal.         Behavior: Behavior normal.         Thought Content: Thought content normal.         Judgment: Judgment normal.            Significant Labs: Pre procedure labs from 7/21/2025 reviewed. .    Significant Imaging: EKG

## 2025-07-28 NOTE — PLAN OF CARE
Received pt to SSCU from home accompanied by daughter.  AAO x 4. Denies pain or discomfort. Respirations even and unlabored. No distress noted. Pt stable.  Admit assessment complete. IV x 2 placed.  Pt oriented to room and call bell placed within reach.  Instructed pt to call for  assistance as needed and pt voiced understanding.

## 2025-07-28 NOTE — ANESTHESIA PREPROCEDURE EVALUATION
07/28/2025  Donald Warren Abadie is a 69 y.o., male.    Pre-operative evaluation for Procedure(s) (LRB):  Ablation atrial fibrillation (N/A)  Ablation, Atrial Flutter, Typical (N/A)  Left atrial appendage closure device (N/A)  Transesophageal echo (JOHN) intra-procedure log documentation (N/A)    Donald Warren Abadie is a 69 y.o. male     Problem List[1]    Review of patient's allergies indicates:   Allergen Reactions    No known drug allergies        Medications Ordered Prior to Encounter[2]    Past Surgical History:   Procedure Laterality Date    ABSCESS DRAINAGE      perirectal    COLONOSCOPY      ENDOSCOPIC ULTRASOUND OF UPPER GASTROINTESTINAL TRACT N/A 6/11/2024    Procedure: ULTRASOUND, UPPER GI TRACT, ENDOSCOPIC;  Surgeon: Mode Wood MD;  Location: [facility];  Service: Endoscopy;  Laterality: N/A;  6/7 portal-EUS in 7-10 days please[place] [facility] [place] approved  Te 6/7-tt  6/10-precall complete-MS    ENDOSCOPIC ULTRASOUND OF UPPER GASTROINTESTINAL TRACT N/A 2/7/2025    Procedure: ULTRASOUND, UPPER GI TRACT, ENDOSCOPIC;  Surgeon: Mode Wood MD;  Location: [facility] (2ND FLR);  Service: Endoscopy;  Laterality: N/A;  1/11 portal-eliquis approved TE 1/11 Dr. Tolentino-Needs an eGD and Eus with ivan-tt  1/31 - attempted precall LVM - Bud  2/4 SWP'S DAUGHTER. PRECALL COMPLETE-RT    ESOPHAGOGASTRODUODENOSCOPY N/A 2/7/2025    Procedure: EGD (ESOPHAGOGASTRODUODENOSCOPY);  Surgeon: Mode Wood MD;  Location: [facility] (2ND FLR);  Service: Endoscopy;  Laterality: N/A;    HAND SURGERY Left     JOINT REPLACEMENT      knee replacement right knee    KIDNEY SURGERY      partial left kidney removal - CA    PARTIAL NEPHRECTOMY Left August 2014    PERCUTANEOUS CRYOTHERAPY OF PERIPHERAL NERVE USING LIQUID NITROUS OXIDE IN CLOSED NEEDLE DEVICE Right 6/17/2019    Procedure: CRYOTHERAPY, NERVE,  "PERIPHERAL, PERCUTANEOUS, USING LIQUID NITROUS OXIDE IN CLOSED NEEDLE DEVICE-right knee iovera;  Surgeon: Donny Hair III, MD;  Location: [facility] CATH LAB;  Service: Pain Management;  Laterality: Right;    TOTAL KNEE ARTHROPLASTY Right 2019    Procedure: ARTHROPLASTY, KNEE, TOTAL-SAME DAY;  Surgeon: Mikael Huerta MD;  Location: [facility] OR [address] [place] [address];  Service: Orthopedics;  Laterality: Right;       Social History[3]      CBC: No results for input(s): "WBC", "RBC", "HGB", "HCT", "PLT", "MCV", "MCH", "MCHC" in the last 72 hours.    CMP: No results for input(s): "NA", "K", "CL", "CO2", "BUN", "CREATININE", "GLU", "MG", "PHOS", "CALCIUM", "ALBUMIN", "PROT", "ALKPHOS", "ALT", "AST", "BILITOT" in the last 72 hours.    INR  No results for input(s): "PT", "INR", "PROTIME", "APTT" in the last 72 hours.        Diagnostic Studies:      EKD Echo:  No results found. However, due to the size of the patient record, not all encounters were searched. Please check Results Review for a complete set of results.        Pre-op Assessment    I have reviewed the Patient Summary Reports.    I have reviewed the NPO Status.   I have reviewed the Medications.     Review of Systems  Anesthesia Hx:  No problems with previous Anesthesia               Denies Personal Hx of Anesthesia complications.                    Cardiovascular:  Exercise tolerance: poor   Hypertension    Dysrhythmias atrial fibrillation  CHF                                   Pulmonary:  Pulmonary Normal                       Renal/:  Chronic Renal Disease   Hx of renal cell CA             Hepatic/GI:      Liver Disease,               Neurological:   CVA, no residual symptoms                                    Endocrine:  Diabetes               Physical Exam  General: Cooperative, Alert and Oriented    Airway:  Mallampati: III   Mouth Opening: Normal  TM Distance: Normal  Tongue: Normal  Neck ROM: Extension Decreased, Extension " Painful    Dental:  Intact        Anesthesia Plan  Type of Anesthesia, risks & benefits discussed:    Anesthesia Type: Gen ETT  Intra-op Monitoring Plan: Standard ASA Monitors and Art Line  Post Op Pain Control Plan: IV/PO Opioids PRN and multimodal analgesia  Induction:  IV  Airway Plan: Video, Post-Induction  Informed Consent: Informed consent signed with the Patient and all parties understand the risks and agree with anesthesia plan.  All questions answered.   ASA Score: 3  Day of Surgery Review of History & Physical: H&P Update referred to the surgeon/provider.    Ready For Surgery From Anesthesia Perspective.     .           [1]   Patient Active Problem List  Diagnosis    Hyperlipidemia associated with type 2 diabetes mellitus    Idiopathic chronic gout of multiple sites without tophus    Paroxysmal atrial fibrillation    Alcohol withdrawal syndrome with complication    Overweight (BMI 25.0-29.9)    Metabolic dysfunction-associated steatotic liver disease (MASLD)    Hemorrhoids    Personal history of kidney cancer    Primary osteoarthritis of right knee    Hyponatremia    Alcohol use disorder, severe, in controlled environment    Atrial flutter    GERD (gastroesophageal reflux disease)    Alcohol use disorder, severe, dependence    Chronic pain of right knee    Type 2 diabetes mellitus without complication, without long-term current use of insulin    Aortic atherosclerosis    Decreased right shoulder range of motion    Primary osteoarthritis involving multiple joints    Generalized anxiety disorder    Chronic diastolic congestive heart failure    Metabolic acidosis    Hypomagnesemia    Recurrent major depressive disorder, in partial remission    Bronchitis    Testicular pain, right    Benign prostatic hyperplasia with nocturia    Traumatic closed displaced fracture of right shoulder with anterior dislocation    Acute encephalopathy    Pelvic floor dysfunction    Shoulder injury    Acute pain of right shoulder     Stiffness in joint    Weakness    Subdural hematoma    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     Current Outpatient Medications on File Prior to Encounter   Medication Sig Dispense Refill    diclofenac sodium (VOLTAREN) 1 % Gel Apply 2 g topically 3 (three) times daily as needed (Right knee - hand pain).      metFORMIN (GLUCOPHAGE-XR) 500 MG ER 24hr tablet Take 2 tablets (1,000 mg total) by mouth 2 (two) times daily with meals. 360 tablet 3    pantoprazole (PROTONIX) 40 MG tablet TAKE 1 TABLET(40 MG) BY MOUTH EVERY DAY 90 tablet 3    rivaroxaban (XARELTO) 15 mg Tab Take 1 tablet (15 mg total) by mouth 2 (two) times daily with meals. 60 tablet 11    rOPINIRole (REQUIP) 1 MG tablet TAKE 1 TABLET(1 MG) BY MOUTH EVERY EVENING 90 tablet 3    rosuvastatin (CRESTOR) 20 MG tablet TAKE 1 TABLET(20 MG) BY MOUTH EVERY DAY 90 tablet 3    sertraline (ZOLOFT) 100 MG tablet Take 1 tablet (100 mg total) by mouth once daily. 90 tablet 3    thiamine (VITAMIN B-1) 100 MG tablet Take 1 tablet (100 mg total) by mouth once daily. (Patient not taking: Reported on 2/18/2025) 30 tablet 2   [3]   Social History  Socioeconomic History    Marital status: Single   Occupational History     Employer: [employer]   Tobacco Use    Smoking status: Never    Smokeless tobacco: Never   Substance and Sexual Activity    Alcohol use: Not Currently     Alcohol/week: 168.0 standard drinks of alcohol     Types: 168 Cans of beer per week     Comment: no ETOH x 7 months    Drug use: Not Currently     Types: Marijuana     Sexual activity: Not Currently     Partners: Female     Social Drivers of Health     Financial Resource Strain: Low Risk  (8/6/2024)    Overall Financial Resource Strain (CARDIA)     Difficulty of Paying Living Expenses: Not hard at all   Food Insecurity: No Food Insecurity (8/6/2024)    Hunger Vital Sign     Worried About Running Out of Food in the Last Year: Never true     Ran Out of Food in the Last Year: Never true   Transportation Needs: No Transportation Needs (8/6/2024)    PRAPARE - Transportation     Lack of Transportation (Medical): No     Lack of Transportation (Non-Medical): No   Physical Activity: Sufficiently Active (8/6/2024)    Exercise Vital Sign     Days of Exercise per Week: 5 days     Minutes of Exercise per Session: 60 min   Recent Concern: Physical Activity - Inactive (8/5/2024)    Exercise Vital Sign     Days of Exercise per Week: 0 days     Minutes of Exercise per Session: 0 min   Stress: No Stress Concern Present (8/6/2024)    [employer] - Occupational Stress Questionnaire     Feeling of Stress : Only a little   Housing Stability: Unknown (8/6/2024)    Housing Stability Vital Sign     Unable to Pay for Housing in the Last Year: No     Homeless in the Last Year: No

## 2025-07-28 NOTE — H&P
[facility] - Short Stay Cardiac Unit  Cardiac Electrophysiology  History and Physical     Admission Date: 7/28/2025  Code Status: Prior   Attending Provider: Trae Angel MD   Principal Problem:Paroxysmal atrial fibrillation    Subjective:     Chief Complaint:  Paroxysmal atrial fibrillation     HPI:Mr. Abadie is a 69 y.o. male with AF/AFL ETOH dependence, low normal EF (50%), small SDH after fall, bradycardia here for follow up.      History obtained through patient report and clinic notes:    Primary Cardiologist: Uriel Tolentino MD  Primary Care Physician: Bacilio Larry MD     Mr. Abadie has a hx of paroxysmal atrial fibrillation/flutter, alcohol dependence, and mildly reduce LVEF (45-50% in 2022) who follows up for AF. He was previously taking prn flecainide with good control of his symptoms however he presented in 10/2023 with AF with RVR in setting of alcohol relapse. He spontaneously converted. Plan was to start multaq 3 days later and follow-up in clinic. PVI had been offered in the past. Multaq never filled due to cost. Recently has had ER visits for symptomatic pAF with RVR. Amiodarone started.     4/30/2024: In-clinic ECG is sinus rhythm with a narrow QRS     In summary, Mr. Abadie is a 68 year-old man, former patient of Dr. Giang, with paroxysmal atrial fibrillation/flutter, alcohol dependence, and mildly reduce LVEF (45-50% in 2022) who follows up for AF. Discussed PVI as an alternative to drug therapy. I spent about a half hour discussing the nature of PVI including transseptal puncture. We discussed risks and benefits at length. Our discussion included, but was not limited to the risk of death, infection, bleeding, stroke, MI, cardiac perforation, embolism, cardiac tamponade, vascular injury, valvular injury, AE fistula, injury to phrenic nerve, pulmonary vein stenosis and other organic injury including the possibility for need for surgery or pacemaker implantation.  Discussed long-term issues with  amiodarone. Sotalol would be an alternative choice at some point.  He wants to think about the ablation.    8/5/2024: Admitted to  s/p fall in the setting of alcohol intoxication.      9/2024: SDH from fall (Patient was going over a bump on his way to a clinic appointment and the chair he was then folded up, resulting in a fall. )  ontacted by radiology with initial CT head read-thin subdural bleeding noted, no mass effect.  Case discussed with Neurosurgery.  Neurosurgery recommending SBP less than 160, no need for reversal of Eliquis, repeat CT head in 6 hours.     11/19/2024: The patient was admitted to hospital medicine for further management of his acute alcohol withdrawals.      11/28/2024: The patient was admitted to hospital medicine for further management of his acute alcohol withdrawals      2/17/2025: Pt's daughter called and says      2/18/2025: During his last office visit with Caroline Barillas NP, he says he has not been drinking since November (went to rehab). Was off meds during that time. When he got home he restarted meds but AF continues. He has been taking multaq once daily due to gabino (LH when HR 50) but has been experiencing increased AF episodes. Says he is very symptomatic with episodes. Palps, SOB. No recent falls.  No ETOH. Also cut out ice tea and coke.   He is currently taking eliquis 5mg BID for stroke prophylaxis. He is taking multaq irregularly (generally daily). Taking metoprolol irregularly.  Kidney function is stable, with a creatinine of 1.4 on 12/17/2024.  I have personally reviewed the patient's EKG today, which shows sinus rhythm at 61bpm. VA interval is 176. QRS is 86. QTc is 420.  In summary, Mr. Abadie is a 69 y.o. male with AF/AFL ETOH dependence, low normal EF (50%), small SDH after fall, bradycardia here for follow up.   Since last clinic visit 4/2024, pt had multiple falls due to ETOH. In Sept one fall resulted in small SDH. He is now off alcohol (mult ED visits for  ETOH withdrawal in Nov). On multaq but complains of bradycardia on full dose and increased AF on partial dose. His AF is very symptomatic. He is interested in ablation to get off AAD. We discussed risks and benefits at length. Our discussion included, but was not limited to the risk of death, infection, bleeding, stroke, MI, cardiac perforation, embolism, cardiac tamponade, vascular injury, valvular injury, AE fistula, injury to phrenic nerve, pulmonary vein stenosis and other organic injury including the possibility for need for surgery. Pt and daughter verbalized understanding. Per daughter, pt has a hx of both AF and AFL.  On eliquis for CVA prophylaxis.     Confirm PVI and RFA of CTI with Dr. Angel  (UPDATE: Will proceed:  PVI and CTI ablation with RFA   Carto for mapping   JOHN day of, cancel if in sinus   Anesthesia   Hold eliquis AM of procedure   Ok to continue multaq)   Continue current meds for now    5/6/2025: Patient presented to [facility] for scheduled RFA PVI + CTI with Dr. Angel. He was in sinus rhythm however noted paroxysms of AF recently. JOHN done noted BRYON thombus. We aborted the procedure. We discussed switching to DVT/PE dosing protocol of xarelto (off label). He was agreeable. Discussed Watchman/PVI combo with PFA if he clears his appendage. Especially in setting of prior subdural hemorrhage ([id number] 3, HAS-BLED 3).   [id number] score: 3  HAS-BLED score: 3    Mr. Abadie presents today to [facility] for scheduled JOHN/Watchman/PVI combo with PFA if he clears his appendage with Dr. Angel. He denies any chest pain, palpitations, SOB, REY, dizziness, light headedness, weakness, syncope, or near syncopal episodes. He denies any bleeding, infections, fevers, rashes, or surgeries in the past 30 days. He is currently taking Xarelto 15 mg BID (last dose taken yesterday PM), metoprolol succinate 25 mg as needed, and multaq 400 mg BID (1/2 tablet AM, 1/2 tablet PM)-notes fatigue and hypotension on  "multaq.    ECG today shows atrial fibrillation at 78 bpm QRS 84 ms QT/Qtc 386/440 ms.  Past Medical History:   Diagnosis Date    A-fib     Alcohol abuse     Anticoagulant long-term use     Anxiety     Arthritis     Atrial flutter     Chronic gout     Diabetes mellitus     DM (diabetes mellitus) 11/16/2016    "boarderline, not taking any meds currently"    Fatty liver     Hyperlipidemia     Renal cell cancer 2014    S/P TKR (total knee replacement), right 06/27/2019    Stroke        Past Surgical History:   Procedure Laterality Date    ABSCESS DRAINAGE      perirectal    COLONOSCOPY      ENDOSCOPIC ULTRASOUND OF UPPER GASTROINTESTINAL TRACT N/A 6/11/2024    Procedure: ULTRASOUND, UPPER GI TRACT, ENDOSCOPIC;  Surgeon: Mode Wood MD;  Location: [facility] ENDO;  Service: Endoscopy;  Laterality: N/A;  6/7 portal-EUS in 7-10 days please, [facility] or [place]-eliquis approved  Te 6/7-tt  6/10-precall complete-MS    ENDOSCOPIC ULTRASOUND OF UPPER GASTROINTESTINAL TRACT N/A 2/7/2025    Procedure: ULTRASOUND, UPPER GI TRACT, ENDOSCOPIC;  Surgeon: Mode Wood MD;  Location: [facility] (2ND FLR);  Service: Endoscopy;  Laterality: N/A;  1/11 portal-eliquis approved TE 1/11 Dr. Tolentino-Needs an eGD and Eus with me.suhas-tt  1/31 - attempted precall LVM - Bud  2/4 SWP'S DAUGHTER. PRECALL COMPLETE-RT    ESOPHAGOGASTRODUODENOSCOPY N/A 2/7/2025    Procedure: EGD (ESOPHAGOGASTRODUODENOSCOPY);  Surgeon: Mode Wood MD;  Location: [facility] (2ND FLR);  Service: Endoscopy;  Laterality: N/A;    HAND SURGERY Left     JOINT REPLACEMENT      knee replacement right knee    KIDNEY SURGERY      partial left kidney removal - [place]    PARTIAL NEPHRECTOMY Left August 2014    PERCUTANEOUS CRYOTHERAPY OF PERIPHERAL NERVE USING LIQUID NITROUS OXIDE IN CLOSED NEEDLE DEVICE Right 6/17/2019    Procedure: CRYOTHERAPY, NERVE, PERIPHERAL, PERCUTANEOUS, USING LIQUID NITROUS OXIDE IN CLOSED NEEDLE DEVICE-right knee iovera;  Surgeon: Donny SCHROEDER" Reginaldo ALBA MD;  Location: [facility] CATH LAB;  Service: Pain Management;  Laterality: Right;    TOTAL KNEE ARTHROPLASTY Right 6/27/2019    Procedure: ARTHROPLASTY, KNEE, TOTAL-SAME DAY;  Surgeon: Mikael Huerta MD;  Location: [facility] OR [address] [place] [address];  Service: Orthopedics;  Laterality: Right;       Review of patient's allergies indicates:   Allergen Reactions    No known drug allergies        No current facility-administered medications on file prior to encounter.     Current Outpatient Medications on File Prior to Encounter   Medication Sig    diclofenac sodium (VOLTAREN) 1 % Gel Apply 2 g topically 3 (three) times daily as needed (Right knee - hand pain).    metFORMIN (GLUCOPHAGE-XR) 500 MG ER 24hr tablet Take 2 tablets (1,000 mg total) by mouth 2 (two) times daily with meals.    pantoprazole (PROTONIX) 40 MG tablet TAKE 1 TABLET(40 MG) BY MOUTH EVERY DAY    rivaroxaban (XARELTO) 15 mg Tab Take 1 tablet (15 mg total) by mouth 2 (two) times daily with meals.    rOPINIRole (REQUIP) 1 MG tablet TAKE 1 TABLET(1 MG) BY MOUTH EVERY EVENING    rosuvastatin (CRESTOR) 20 MG tablet TAKE 1 TABLET(20 MG) BY MOUTH EVERY DAY    sertraline (ZOLOFT) 100 MG tablet Take 1 tablet (100 mg total) by mouth once daily.    thiamine (VITAMIN B-1) 100 MG tablet Take 1 tablet (100 mg total) by mouth once daily. (Patient not taking: Reported on 2/18/2025)     Family History       Problem Relation (Age of Onset)    Alcohol abuse Brother    Alzheimer's disease Mother, Brother    Cancer Father, Sister    Colon cancer Father    Colon polyps Brother    Diabetes Mother    Lung cancer Father, Sister          Tobacco Use    Smoking status: Never    Smokeless tobacco: Never   Substance and Sexual Activity    Alcohol use: Not Currently     Alcohol/week: 168.0 standard drinks of alcohol     Types: 168 Cans of beer per week     Comment: no ETOH x 7 months    Drug use: Not Currently     Types: Marijuana    Sexual activity: Not Currently     Partners: Female      Review of Systems   Constitutional: Negative for chills, fever and malaise/fatigue.   HENT:  Negative for congestion and nosebleeds.    Eyes:  Negative for blurred vision.   Cardiovascular:  Positive for dyspnea on exertion. Negative for chest pain, irregular heartbeat, leg swelling, near-syncope, orthopnea, palpitations, paroxysmal nocturnal dyspnea and syncope.   Respiratory:  Negative for cough, hemoptysis, shortness of breath, sleep disturbances due to breathing, sputum production and wheezing.    Endocrine: Negative for polyphagia.   Hematologic/Lymphatic: Negative for bleeding problem. Does not bruise/bleed easily.   Skin:  Negative for itching and rash.   Musculoskeletal:  Negative for back pain, joint swelling, muscle cramps and muscle weakness.   Gastrointestinal:  Negative for bloating, abdominal pain, hematemesis, hematochezia, nausea and vomiting.   Genitourinary:  Negative for dysuria and hematuria.   Neurological:  Negative for dizziness, focal weakness, headaches, light-headedness, loss of balance, numbness and weakness.   Psychiatric/Behavioral:  Negative for altered mental status.      Objective:     Vital Signs (Most Recent):  Temp: 98.8 °F (37.1 °C) (07/28/25 0551)  Pulse: 87 (07/28/25 0551)  Resp: 18 (07/28/25 0551)  BP: 102/61 (07/28/25 0553)  SpO2: 97 % (07/28/25 0551) Vital Signs (24h Range):  Temp:  [98.8 °F (37.1 °C)] 98.8 °F (37.1 °C)  Pulse:  [87] 87  Resp:  [18] 18  SpO2:  [97 %] 97 %  BP: ()/(61-67) 102/61       Weight: 81.6 kg (180 lb)  Body mass index is 29.05 kg/m².    SpO2: 97 %        Physical Exam  Vitals and nursing note reviewed.   Constitutional:       General: He is not in acute distress.     Appearance: Normal appearance. He is well-developed. He is not diaphoretic.   HENT:      Head: Normocephalic and atraumatic.      Mouth/Throat:      Mouth: Mucous membranes are moist.      Pharynx: No oropharyngeal exudate.   Eyes:      Conjunctiva/sclera: Conjunctivae normal.       Pupils: Pupils are equal, round, and reactive to light.   Cardiovascular:      Rate and Rhythm: Normal rate. Rhythm irregular.      Pulses: Normal pulses and intact distal pulses.           Radial pulses are 2+ on the right side and 2+ on the left side.        Dorsalis pedis pulses are 2+ on the right side and 2+ on the left side.      Heart sounds: Normal heart sounds.   Pulmonary:      Effort: Pulmonary effort is normal. No accessory muscle usage or respiratory distress.      Breath sounds: Normal breath sounds. No decreased breath sounds, wheezing, rhonchi or rales.   Chest:      Chest wall: No tenderness.   Abdominal:      General: Bowel sounds are normal. There is no distension.      Palpations: Abdomen is soft.      Tenderness: There is no abdominal tenderness. There is no guarding.   Musculoskeletal:         General: Normal range of motion.      Cervical back: Normal range of motion and neck supple.   Skin:     General: Skin is warm and dry.      Findings: No erythema or rash.   Neurological:      Mental Status: He is alert and oriented to person, place, and time. He is not disoriented.      Sensory: No sensory deficit.      Motor: No abnormal muscle tone.      Coordination: Coordination normal.      Gait: Gait normal.   Psychiatric:         Mood and Affect: Mood normal.         Behavior: Behavior normal.         Thought Content: Thought content normal.         Judgment: Judgment normal.     Significant Labs: Pre procedure labs from 7/21/2025 reviewed. .    Significant Imaging: EKG  Assessment and Plan:   Plan:  -JOHN  -Watchman/PVI CTI PFA  -Anesthesia for sedation    Dr. Angel at bedside speaking with patient and daughter regarding all risks benefits and alternatives of planned procedures. All questions answered. Patient and daughter are in agreement. Consents signed.    Melissa Lane NP  Cardiac Electrophysiology  [facility] - Short Stay Cardiac Unit    Attending: Trae Angel MD

## 2025-07-28 NOTE — DISCHARGE SUMMARY
[facility] - Cardiology  Cardiac Electrophysiology  Discharge Summary      Patient Name: Donald Warren Abadie  MRN: [id number]  Admission Date: 7/28/2025  Hospital Length of Stay: 1 days  Discharge Date and Time: 7/28/2025 11:16 AM  Attending Physician: No att. providers found    Discharging Provider: Melissa Lane NP  Primary Care Physician: Bacilio Larry MD    HPI: Mr. Abadie is a 69 y.o. male with AF/AFL ETOH dependence, low normal EF (50%), small SDH after fall, bradycardia here for follow up.      History obtained through patient report and clinic notes:     Primary Cardiologist: Uriel Tolentino MD  Primary Care Physician: Bacilio Larry MD     Mr. Abadie has a hx of paroxysmal atrial fibrillation/flutter, alcohol dependence, and mildly reduce LVEF (45-50% in 2022) who follows up for AF. He was previously taking prn flecainide with good control of his symptoms however he presented in 10/2023 with AF with RVR in setting of alcohol relapse. He spontaneously converted. Plan was to start multaq 3 days later and follow-up in clinic. PVI had been offered in the past. Multaq never filled due to cost. Recently has had ER visits for symptomatic pAF with RVR. Amiodarone started.     4/30/2024: In-clinic ECG is sinus rhythm with a narrow QRS     In summary, Mr. Abadie is a 68 year-old man, former patient of Dr. Giang, with paroxysmal atrial fibrillation/flutter, alcohol dependence, and mildly reduce LVEF (45-50% in 2022) who follows up for AF. Discussed PVI as an alternative to drug therapy. I spent about a half hour discussing the nature of PVI including transseptal puncture. We discussed risks and benefits at length. Our discussion included, but was not limited to the risk of death, infection, bleeding, stroke, MI, cardiac perforation, embolism, cardiac tamponade, vascular injury, valvular injury, AE fistula, injury to phrenic nerve, pulmonary vein stenosis and other organic injury including the possibility for  need for surgery or pacemaker implantation.  Discussed long-term issues with amiodarone. Sotalol would be an alternative choice at some point.  He wants to think about the ablation.     8/5/2024: Admitted to  s/p fall in the setting of alcohol intoxication.      9/2024: SDH from fall (Patient was going over a bump on his way to a clinic appointment and the chair he was then folded up, resulting in a fall. )  ontacted by radiology with initial CT head read-thin subdural bleeding noted, no mass effect.  Case discussed with Neurosurgery.  Neurosurgery recommending SBP less than 160, no need for reversal of Eliquis, repeat CT head in 6 hours.     11/19/2024: The patient was admitted to hospital medicine for further management of his acute alcohol withdrawals.      11/28/2024: The patient was admitted to hospital medicine for further management of his acute alcohol withdrawals      2/17/2025: Pt's daughter called and says      2/18/2025: During his last office visit with Caroline Barillas NP, he says he has not been drinking since November (went to rehab). Was off meds during that time. When he got home he restarted meds but AF continues. He has been taking multaq once daily due to gabino (LH when HR 50) but has been experiencing increased AF episodes. Says he is very symptomatic with episodes. Palps, SOB. No recent falls.  No ETOH. Also cut out ice tea and coke.   He is currently taking eliquis 5mg BID for stroke prophylaxis. He is taking multaq irregularly (generally daily). Taking metoprolol irregularly.  Kidney function is stable, with a creatinine of 1.4 on 12/17/2024.  I have personally reviewed the patient's EKG today, which shows sinus rhythm at 61bpm. NH interval is 176. QRS is 86. QTc is 420.  In summary, Mr. Abadie is a 69 y.o. male with AF/AFL ETOH dependence, low normal EF (50%), small SDH after fall, bradycardia here for follow up.   Since last clinic visit 4/2024, pt had multiple falls due to ETOH. In  Sept one fall resulted in small SDH. He is now off alcohol ([facility] ED visits for ETOH withdrawal in Nov). On multaq but complains of bradycardia on full dose and increased AF on partial dose. His AF is very symptomatic. He is interested in ablation to get off AAD. We discussed risks and benefits at length. Our discussion included, but was not limited to the risk of death, infection, bleeding, stroke, MI, cardiac perforation, embolism, cardiac tamponade, vascular injury, valvular injury, AE fistula, injury to phrenic nerve, pulmonary vein stenosis and other organic injury including the possibility for need for surgery. Pt and daughter verbalized understanding. Per daughter, pt has a hx of both AF and AFL.  On eliquis for CVA prophylaxis.     Confirm PVI and RFA of CTI with Dr. Angel  (UPDATE: Will proceed:  PVI and CTI ablation with RFA   Carto for mapping   JOHN day of, cancel if in sinus   Anesthesia   Hold eliquis AM of procedure   Ok to continue multaq)   Continue current meds for now     5/6/2025: Patient presented to [facility] for scheduled RFA PVI + CTI with Dr. Angel. He was in sinus rhythm however noted paroxysms of AF recently. JOHN done noted BRYON thombus. We aborted the procedure. We discussed switching to DVT/PE dosing protocol of xarelto (off label). He was agreeable. Discussed Watchman/PVI combo with PFA if he clears his appendage. Especially in setting of prior subdural hemorrhage ([id number] 3, HAS-BLED 3).   [id number] score: 3  HAS-BLED score: 3     Mr. Abadie presents today to [facility] for scheduled JOHN/Watchman/PVI combo with PFA if he clears his appendage with Dr. Angel. He denies any chest pain, palpitations, SOB, REY, dizziness, light headedness, weakness, syncope, or near syncopal episodes. He denies any bleeding, infections, fevers, rashes, or surgeries in the past 30 days. He is currently taking Xarelto 15 mg BID (last dose taken yesterday PM), metoprolol succinate 25 mg as needed, and multaq 400 mg  BID (1/2 tablet AM, 1/2 tablet PM)-notes fatigue and hypotension on multaq.     ECG today shows atrial fibrillation at 78 bpm QRS 84 ms QT/Qtc 386/440 ms.    Procedure(s) (LRB):  Ablation atrial fibrillation (N/A)  Ablation, Atrial Flutter, Typical (N/A)  Left atrial appendage closure device (N/A)  Transesophageal echo (JOHN) intra-procedure log documentation (N/A)     Indwelling Lines/Drains at time of discharge:  Lines/Drains/Airways    none    Hospital Course: Mr. Abadie presented today to [facility] for scheduled JOHN/Watchman/PVI combo with PFA with Dr. Angel. Patient underwent JOHN which showed evidence of a thrombus in the left atrial appendage. Case discussed with Dr. Angel. We will discontinue multaq. Start metoprolol succinate 12.5 mg daily (patient and daughter state his BP cannot tolerate 25 mg daily). Monitor BP at home. For now, we will continue Xarelto 20 mg once daily with dinner with the plan to eventually switch to coumadin. Mr. Abadie currently does not have a license and does not drive so he is unable to have consistent weekly lab visits for INR blood draws. Referral to [facility] to set up weekly home INR checks. Will also coordinate establishing patient with coumadin clinic once set up with . We will obtain a 14 day cardiac monitor to assess AF burden (patient in AF paroxysmal, in AF on arrival today and in SR post JOHN).  Mr. Abadie was assessed at bedside prior to discharge. He reported feeling well and denied chest discomfort, shortness of breath, palpitations, lightheadedness, or any other acute symptoms. Discharge instructions were discussed with patient and daughter and all questions were answered. He was discharged home in stable condition.     Goals of Care Treatment Preferences:  Code Status: Full Code    Consults: Cardiology JOHN    Significant Diagnostic Studies: JOHN    Final Active Diagnoses:    Diagnosis Date Noted POA    PRINCIPAL PROBLEM:  Paroxysmal atrial fibrillation [I48.0]  05/22/2014 Yes      Problems Resolved During this Admission:     Discharged Condition: stable    Disposition: Home or Self Care    Follow Up:   Follow-up Information       Trae Angel MD Follow up in 1 month(s).    Specialties: Electrophysiology, Cardiology  Contact information:  [address]  [facility] [address]  [phone]                           Patient Instructions:      Ambulatory referral/consult to Anticoagulation Monitoring (PHARMACIST MANAGED)   Standing Status: Future   Referral Priority: Routine Referral Type: Consultation   Referral Reason: Specialty Services Required   Requested Specialty: Cardiology   Number of Visits Requested: 1     No driving until:   Order Comments: No driving or operating heavy machinery for 24-48 hours after your procedure because you received sedation.     Other restrictions (specify):   Order Comments: Medications:  -Take Xarelto 20 mg once daily with dinner  -Plan to eventually switch to coumadin. Mr. Abadie currently does not drive presenting a challenge for consistent weekly lab visits for INR blood draws. Ambulatory referral placed to home health to set up weekly home INR checks. Will also coordinate establishing patient with coumadin clinic once set up with .   -Ambulatory referral to coumadin clinic.  -Start Metoprolol Succinate 1/2 tablet (12.5 mg total) once daily.  -Discontinue Multaq  -Continue to take all of your other home medications as listed on your medication list after you are discharged.  -Continue to monitor your blood pressure and heart rate at home.    New Medications:  -None    Diet  -You may resume oral intake after you are discharged, as long you have no swallowing difficulties.    Because you have received sedation for this procedure:  -Limit activity for the remainder of the day.  -Do not smoke for at least 6 hours and until you are fully awake and alert.  -Do not drink alcoholic beverage for 24 hours.  -Do not drive for 24  hours.  -Defer important decision making until the following day.     Go to the Emergency Department if you develop:   -Bleeding  -Weakness or numbness  -Visual, gait or speech disturbance  -New chest pain, palpitations, shortness of breath, rapid heart beat, or fainting  -Fever    Follow up:  -14 day Cardiac Monitor  -Dr. Trae Angel in 1 month.     Any need to reschedule or cancel procedures, or any questions regarding your procedures should be addressed directly with the Arrhythmia Department Nurses at the following phone number: [phone].     Notify your health care provider if you experience any of the following:  increased confusion or weakness     Notify your health care provider if you experience any of the following:  persistent dizziness, light-headedness, or visual disturbances     Notify your health care provider if you experience any of the following:  severe persistent headache     Notify your health care provider if you experience any of the following:  worsening rash     Notify your health care provider if you experience any of the following:  redness, tenderness, or signs of infection (pain, swelling, redness, odor or green/yellow discharge around incision site)     Notify your health care provider if you experience any of the following:  difficulty breathing or increased cough     Notify your health care provider if you experience any of the following:  severe uncontrolled pain     Notify your health care provider if you experience any of the following:  persistent nausea and vomiting or diarrhea     Notify your health care provider if you experience any of the following:  temperature >100.4     Cardiac Monitor - 3-15 Day Adult (Cupid Only)   Standing Status: Future Number of Occurrences: 1 Standing Exp. Date: 07/28/26     Order Specific Question Answer Comments   Duration: 14 days    Release to patient Immediate      Medications:  Reconciled Home Medications:      Medication List        CHANGE  how you take these medications      metoprolol succinate 25 MG 24 hr tablet  Commonly known as: TOPROL-XL  Take 0.5 tablets (12.5 mg total) by mouth once daily.  What changed:   how much to take  when to take this  reasons to take this  additional instructions     rivaroxaban 20 mg Tab  Commonly known as: XARELTO  Take 1 tablet (20 mg total) by mouth once daily with dinner.  What changed:   medication strength  how much to take  how to take this  when to take this  additional instructions            STOP taking these medications      MULTAQ 400 mg Tab  Generic drug: dronedarone            ASK your doctor about these medications      allopurinoL 100 MG tablet  Commonly known as: ZYLOPRIM  Take 2 tablets (200 mg total) by mouth once daily.     diclofenac sodium 1 % Gel  Commonly known as: VOLTAREN  Apply 2 g topically 3 (three) times daily as needed (Right knee - hand pain).     metFORMIN 500 MG ER 24hr tablet  Commonly known as: GLUCOPHAGE-XR  Take 2 tablets (1,000 mg total) by mouth 2 (two) times daily with meals.     pantoprazole 40 MG tablet  Commonly known as: PROTONIX  TAKE 1 TABLET(40 MG) BY MOUTH EVERY DAY     rOPINIRole 1 MG tablet  Commonly known as: REQUIP  TAKE 1 TABLET(1 MG) BY MOUTH EVERY EVENING     rosuvastatin 20 MG tablet  Commonly known as: CRESTOR  TAKE 1 TABLET(20 MG) BY MOUTH EVERY DAY     sertraline 100 MG tablet  Commonly known as: ZOLOFT  Take 1 tablet (100 mg total) by mouth once daily.     tamsulosin 0.4 mg Cap  Commonly known as: FLOMAX  Take 1 capsule (0.4 mg total) by mouth once daily.     thiamine 100 MG tablet  Commonly known as: VITAMIN B-1  Take 1 tablet (100 mg total) by mouth once daily.            Plan:  -Discontinue multaq.   -Start metoprolol succinate 12.5 mg daily (patient and daughter state his BP cannot tolerate 25 mg daily).   -Monitor BP at home.   -Continue Xarelto 20 mg once daily with dinner with the plan to eventually switch to coumadin.   -Referral to home health  placed to set up weekly home INR checks.   -Coordinate establishing patient with coumadin clinic once set up with .   -Obtain a 14 day cardiac monitor to assess AF burden (patient in AF paroxysmal, in AF on arrival today and in SR post JOHN).    Time spent on the discharge of patient: 40 minutes    Melissa Lane NP  Cardiac Electrophysiology  [facility] - [facility]    Attending: Trae Angel MD

## 2025-07-28 NOTE — TRANSFER OF CARE
"Anesthesia Transfer of Care Note    Patient: Donald Warren Abadie    Procedure(s) Performed: Procedure(s) (LRB):  Ablation atrial fibrillation (N/A)  Ablation, Atrial Flutter, Typical (N/A)  Left atrial appendage closure device (N/A)  Transesophageal echo (JOHN) intra-procedure log documentation (N/A)    Patient location: PACU    Anesthesia Type: general    Transport from OR: Transported from OR on 6-10 L/min O2 by face mask with adequate spontaneous ventilation    Post pain: adequate analgesia    Post assessment: no apparent anesthetic complications and tolerated procedure well    Post vital signs: stable    Level of consciousness: awake, alert and oriented    Nausea/Vomiting: no nausea/vomiting    Complications: none    Transfer of care protocol was followed      Last vitals: Visit Vitals  /70 (BP Location: Right arm, Patient Position: Lying)   Pulse 68   Temp 37.1 °C (98.8 °F) (Temporal)   Resp 18   Ht 5' 6" (1.676 m)   Wt 81.6 kg (180 lb)   SpO2 99%   BMI 29.05 kg/m²     "

## 2025-07-28 NOTE — ANESTHESIA PROCEDURE NOTES
Arterial    Diagnosis: Paroxysmal atrial fibrillation [I48.0]    Patient location during procedure: done in OR    Staffing  Authorizing Provider: Rashad Mathew MD  Performing Provider: Rashad Mathew MD    Staffing  Performed by: Rashad Mathew MD  Authorized by: Rashad Mathew MD    Anesthesiologist was present at the time of the procedure.    Preanesthetic Checklist  Completed: patient identified, IV checked, site marked, risks and benefits discussed, surgical consent, monitors and equipment checked, pre-op evaluation, timeout performed and anesthesia consent givenArterial  Skin Prep: chlorhexidine gluconate  Local Infiltration: none  Orientation: right  Location: radial    Catheter Size: 20 G  Catheter placement by Anatomical landmarks. Heme positive aspiration all ports. Insertion Attempts: 2  Assessment  Dressing: secured with tape and tegaderm  Patient: Tolerated well

## 2025-07-28 NOTE — HPI
Mr. Abadie is a 69 y.o. male with AF/AFL ETOH dependence, low normal EF (50%), small SDH after fall, bradycardia here for follow up.      Background:     Primary Cardiologist: Uriel Tolentino MD  Primary Care Physician: Bacilio Larry MD     Mr. Abadie has a hx of paroxysmal atrial fibrillation/flutter, alcohol dependence, and mildly reduce LVEF (45-50% in 2022) who follows up for AF. He was previously taking prn flecainide with good control of his symptoms however he presented in 10/2023 with AF with RVR in setting of alcohol relapse. He spontaneously converted. Plan was to start multaq 3 days later and follow-up in clinic. PVI had been offered in the past. Multaq never filled due to cost. Recently has had ER visits for symptomatic pAF with RVR. Amiodarone started.     4/30/2024: In-clinic ECG is sinus rhythm with a narrow QRS     In summary, Mr. Abadie is a 68 year-old man, former patient of Dr. Giang, with paroxysmal atrial fibrillation/flutter, alcohol dependence, and mildly reduce LVEF (45-50% in 2022) who follows up for AF. Discussed PVI as an alternative to drug therapy. I spent about a half hour discussing the nature of PVI including transseptal puncture. We discussed risks and benefits at length. Our discussion included, but was not limited to the risk of death, infection, bleeding, stroke, MI, cardiac perforation, embolism, cardiac tamponade, vascular injury, valvular injury, AE fistula, injury to phrenic nerve, pulmonary vein stenosis and other organic injury including the possibility for need for surgery or pacemaker implantation.  Discussed long-term issues with amiodarone. Sotalol would be an alternative choice at some point.  He wants to think about the ablation.     Update (02/18/2025):     8/5/2024: Admitted to  s/p fall in the setting of alcohol intoxication.      9/2024: SDH from fall (Patient was going over a bump on his way to a clinic appointment and the chair he was then folded up,  resulting in a fall. )  ontacted by radiology with initial CT head read-thin subdural bleeding noted, no mass effect.  Case discussed with Neurosurgery.  Neurosurgery recommending SBP less than 160, no need for reversal of Eliquis, repeat CT head in 6 hours.     11/19/2024: The patient was admitted to hospital medicine for further management of his acute alcohol withdrawals.      11/28/2024: The patient was admitted to hospital medicine for further management of his acute alcohol withdrawals      2/17/2025: Pt's daughter called and says      2/18/2025: During his last office visit with Caroline Barillas NP, he says he has not been drinking since November (went to rehab). Was off meds during that time. When he got home he restarted meds but AF continues. He has been taking multaq once daily due to gabino (LH when HR 50) but has been experiencing increased AF episodes. Says he is very symptomatic with episodes. Palps, SOB. No recent falls.  No ETOH. Also cut out ice tea and coke.   He is currently taking eliquis 5mg BID for stroke prophylaxis. He is taking multaq irregularly (generally daily). Taking metoprolol irregularly.  Kidney function is stable, with a creatinine of 1.4 on 12/17/2024.  I have personally reviewed the patient's EKG today, which shows sinus rhythm at 61bpm. OK interval is 176. QRS is 86. QTc is 420.  In summary, Mr. Abadie is a 69 y.o. male with AF/AFL ETOH dependence, low normal EF (50%), small SDH after fall, bradycardia here for follow up.   Since last clinic visit 4/2024, pt had multiple falls due to ETOH. In Sept one fall resulted in small SDH. He is now off alcohol (mult ED visits for ETOH withdrawal in Nov). On multaq but complains of bradycardia on full dose and increased AF on partial dose. His AF is very symptomatic. He is interested in ablation to get off AAD. We discussed risks and benefits at length. Our discussion included, but was not limited to the risk of death, infection, bleeding,  stroke, MI, cardiac perforation, embolism, cardiac tamponade, vascular injury, valvular injury, AE fistula, injury to phrenic nerve, pulmonary vein stenosis and other organic injury including the possibility for need for surgery. Pt and daughter verbalized understanding. Per daughter, pt has a hx of both AF and AFL.  On eliquis for CVA prophylaxis.     Confirm PVI and RFA of CTI with Dr. Angel  (UPDATE: Will proceed:  PVI and CTI ablation with RFA   Carto for mapping   JOHN day of, cancel if in sinus   Anesthesia   Hold eliquis AM of procedure   Ok to continue multaq)   Continue current meds for now    5/6/2025: Patient presented to [facility] for scheduled RFA PVI + CTI with Dr. Angel. He was in sinus rhythm however noted paroxysms of AF recently. JOHN done noted BRYON thombus. We aborted the procedure. We discussed switching to DVT/PE dosing protocol of xarelto (off label). He was agreeable. Discussed Watchman/PVI combo with PFA if he clears his appendage. Especially in setting of prior subdural hemorrhage ([id number] 3, HAS-BLED 3).   [id number] score: 3  HAS-BLED score: 3    Mr. Abadie presents today to [facility] for scheduled JOHN/Watchman/PVI combo with PFA if he clears his appendage with Dr. Angel. He denies any chest pain, palpitations, SOB, REY, dizziness, light headedness, weakness, syncope, or near syncopal episodes. He denies any bleeding, infections, fevers, rashes, or surgeries in the past 30 days. He is currently taking Xarelto 15 mg BID (last dose taken yesterday PM), metoprolol succinate 25 mg as needed, and multaq 400 mg BID (1/2 tablet AM, 1/2 tablet PM)-notes fatigue and hypotension on multaq.    ECG today shows atrial fibrillation at 78 bpm QRS 84 ms QT/Qtc 386/440 ms.

## 2025-07-28 NOTE — DISCHARGE INSTRUCTIONS
Medications:  -Take Xarelto 20 mg once daily with dinner  -Plan to eventually switch to coumadin. Mr. Abadie currently does not drive presenting a challenge for consistent weekly lab visits for INR blood draws. Ambulatory referral placed to home health to set up weekly home INR checks. Will also coordinate establishing patient with coumadin clinic once set up with .   -Ambulatory referral to coumadin clinic.  -Start Metoprolol Succinate 1/2 tablet (12.5 mg total) once daily.  -Discontinue Multaq  -Continue to take all of your other home medications as listed on your medication list after you are discharged.  -Continue to monitor your blood pressure and heart rate at home.    New Medications:  -None    Diet  -You may resume oral intake after you are discharged, as long you have no swallowing difficulties.    Because you have received sedation for this procedure:  -Limit activity for the remainder of the day.  -Do not smoke for at least 6 hours and until you are fully awake and alert.  -Do not drink alcoholic beverage for 24 hours.  -Do not drive for 24 hours.  -Defer important decision making until the following day.     Go to the Emergency Department if you develop:   -Bleeding  -Weakness or numbness  -Visual, gait or speech disturbance  -New chest pain, palpitations, shortness of breath, rapid heart beat, or fainting  -Fever    Follow up:  -14 day Cardiac Monitor  -Dr. Trae Angel in 1 month.     Any need to reschedule or cancel procedures, or any questions regarding your procedures should be addressed directly with the Arrhythmia Department Nurses at the following phone number: [phone].

## 2025-07-28 NOTE — ANESTHESIA POSTPROCEDURE EVALUATION
Anesthesia Post Evaluation    Patient: Donald Warren Abadie    Procedure(s) Performed: Procedure(s) (LRB):  Ablation atrial fibrillation (N/A)  Ablation, Atrial Flutter, Typical (N/A)  Left atrial appendage closure device (N/A)  Transesophageal echo (JOHN) intra-procedure log documentation (N/A)    Final Anesthesia Type: general      Patient location during evaluation: PACU  Patient participation: Yes- Able to Participate  Level of consciousness: awake and alert  Post-procedure vital signs: reviewed and stable  Pain management: adequate  Airway patency: patent    PONV status at discharge: No PONV  Anesthetic complications: no      Cardiovascular status: hemodynamically stable  Respiratory status: unassisted  Hydration status: euvolemic  Follow-up not needed.              Vitals Value Taken Time   /58 07/28/25 10:40   Temp 36.6 °C (97.8 °F) 07/28/25 10:10   Pulse 70 07/28/25 10:40   Resp 18 07/28/25 10:40   SpO2 99 % 07/28/25 10:40         No case tracking events are documented in the log.      Pain/French Score: Pain Rating Post Med Admin: 0 (7/28/2025  8:30 AM)  French Score: 10 (7/28/2025 10:30 AM)

## 2025-07-28 NOTE — NURSING TRANSFER
Nursing Transfer Note      7/28/2025   10:03 AM    Nurse giving handoff:kayla Prieto  Nurse receiving handoff:kayla Zhao    Reason patient is being transferred: admit    Transfer To: 7c     Transfer via stretcher    Transfer with cardiac monitoring    Transported by tech    Telemetry: Box Number    Order for Tele Monitor? Yes    Additional Lines:     Medicines sent: na    Any special needs or follow-up needed: na    Patient belongings transferred with patient: Yes    Chart send with patient: Yes    Notified: spouse

## 2025-07-28 NOTE — PLAN OF CARE
Received report from MARLEEN Prieto PACU. Pt is s/p JOHN. Pt is aaox4. Vss. Resp even and unlabored. Post procedure protocol reviewed with patient. Understanding verbalized. Daughter at bedside. Nurse call bell within reach. Will monitor

## 2025-07-30 ENCOUNTER — TELEPHONE (OUTPATIENT)
Dept: ELECTROPHYSIOLOGY | Facility: CLINIC | Age: 70
End: 2025-07-30
Payer: MEDICARE

## 2025-07-30 NOTE — TELEPHONE ENCOUNTER
Spoke with patient to follow up on referral for HH placed upon discharge on 7/28/2025.  Patient is currently taking xarelto 20 mg daily and plan is to switch him to warfarin with INR 2.5-3.5 then bring him in for JOHN after 6-8 weeks of therapeutic INR. Since he has no transportation for INR checks  Melissa Chung NP sent a referral [facility] as well as to [facility] to get home health set up for him for INR checks once the warfarin is started. [facility] called, spoke with Eulogio, who took the patient's information and stated that she will have their intake staff contact me back to discuss status of the referral.

## 2025-07-31 ENCOUNTER — TELEPHONE (OUTPATIENT)
Dept: INTERNAL MEDICINE | Facility: CLINIC | Age: 70
End: 2025-07-31
Payer: MEDICARE

## 2025-07-31 ENCOUNTER — PATIENT OUTREACH (OUTPATIENT)
Dept: ADMINISTRATIVE | Facility: CLINIC | Age: 70
End: 2025-07-31
Payer: MEDICARE

## 2025-07-31 ENCOUNTER — TELEPHONE (OUTPATIENT)
Dept: ELECTROPHYSIOLOGY | Facility: CLINIC | Age: 70
End: 2025-07-31
Payer: MEDICARE

## 2025-07-31 DIAGNOSIS — I48.0 PAROXYSMAL ATRIAL FIBRILLATION: Primary | ICD-10-CM

## 2025-07-31 NOTE — PROGRESS NOTES
C3 nurse spoke with Donald Warren Abadie, patient's daughter, Chani, for a TCC post hospital discharge follow up call. The patient has a scheduled HOSFU appointment with Bacilio Larry MD on 8/1/2025 at 10 AM.

## 2025-07-31 NOTE — TELEPHONE ENCOUNTER
----- Message from Nurse May sent at 7/31/2025 10:04 AM CDT -----  Regarding: Follow up appointment  Good morning,     The patient was discharged from [facility] on 07/28/2025 for Paroxysmal atrial fibrillation s/p JOHN.  It is noted on the discharge summary the patient is to have a follow up visit with Trae Angel MD  in one month.     Thank you,   Joshua DUMONT lpn  Transition of Care

## 2025-07-31 NOTE — TELEPHONE ENCOUNTER
"[facility]message sent to staff of Trae Angel MD requesting to contact patient to schedule a follow up appointment with patient within one month per discharge summary on 7/28/2025. and to staff of Melissa Lane NP stating "TCC post discharge hospital follow up call was completed with Donald Warren Abadie, patient's daughter, Chani.  She states she has not heard from anyone with home health. C3 nurse called [facility], was advised by Mone in Intake they unable to accept the patient for labs only and as of today, the earliest SOC is week of 08/25/2025."  "

## 2025-07-31 NOTE — TELEPHONE ENCOUNTER
Patient's daughter reported use of 650 mg of Tylenol PRN Can this be added to record please      ----- Message from Nurse May sent at 7/31/2025 10:15 AM CDT -----  Regarding: Medication  Patient's daughter, Chani, reports the patient is taking the following medications that are not a part of the discharge summary or MAR.     PLEASE RESPOND DIRECTLY TO THE PT IN REGARD TO THIS MESSAGE.    Tylenol 650 mg tablet as needed        Respectfully,  Joshua DUMONT, courtneyn  Transition of Care

## 2025-07-31 NOTE — TELEPHONE ENCOUNTER
Spoke with patient and advised that his insurance will not cover for home health strictly for lab draw for INR checks, therefore Dr Angel would like him to continue taking the Xarelto 20 mg daily. Instructed on the importance that of taking the Xarelto in the evening with food and never in the morning as this would effect the absorption of the medication, possibly making it less effective. Patient verbalized understanding of instruction provided.

## 2025-08-01 ENCOUNTER — OFFICE VISIT (OUTPATIENT)
Dept: INTERNAL MEDICINE | Facility: CLINIC | Age: 70
End: 2025-08-01
Payer: MEDICARE

## 2025-08-01 ENCOUNTER — TELEPHONE (OUTPATIENT)
Dept: ELECTROPHYSIOLOGY | Facility: CLINIC | Age: 70
End: 2025-08-01
Payer: MEDICARE

## 2025-08-01 VITALS
BODY MASS INDEX: 29.48 KG/M2 | DIASTOLIC BLOOD PRESSURE: 62 MMHG | WEIGHT: 183.44 LBS | SYSTOLIC BLOOD PRESSURE: 100 MMHG | HEART RATE: 61 BPM | OXYGEN SATURATION: 98 % | HEIGHT: 66 IN

## 2025-08-01 DIAGNOSIS — F10.21 ALCOHOL DEPENDENCE IN REMISSION: ICD-10-CM

## 2025-08-01 DIAGNOSIS — I48.91 ATRIAL FIBRILLATION WITH RVR: Primary | ICD-10-CM

## 2025-08-01 DIAGNOSIS — E11.9 TYPE 2 DIABETES MELLITUS WITHOUT COMPLICATION, WITHOUT LONG-TERM CURRENT USE OF INSULIN: ICD-10-CM

## 2025-08-01 DIAGNOSIS — M54.9 RIGHT-SIDED BACK PAIN, UNSPECIFIED BACK LOCATION, UNSPECIFIED CHRONICITY: ICD-10-CM

## 2025-08-01 DIAGNOSIS — M54.2 NECK PAIN: ICD-10-CM

## 2025-08-01 DIAGNOSIS — I51.3 THROMBUS OF ATRIAL APPENDAGE: ICD-10-CM

## 2025-08-01 DIAGNOSIS — K76.0 FATTY LIVER: ICD-10-CM

## 2025-08-01 DIAGNOSIS — G56.03 CARPAL TUNNEL SYNDROME, BILATERAL: ICD-10-CM

## 2025-08-01 LAB
ABO + RH BLD: NORMAL
ABO + RH BLD: NORMAL
BLD PROD TYP BPU: NORMAL
BLD PROD TYP BPU: NORMAL
BLOOD UNIT EXPIRATION DATE: NORMAL
BLOOD UNIT EXPIRATION DATE: NORMAL
BLOOD UNIT TYPE CODE: 6200
BLOOD UNIT TYPE CODE: 6200
CROSSMATCH INTERPRETATION: NORMAL
CROSSMATCH INTERPRETATION: NORMAL
DISPENSE STATUS: NORMAL
DISPENSE STATUS: NORMAL
RBCS: NORMAL
UNIT NUMBER: NORMAL
UNIT NUMBER: NORMAL

## 2025-08-01 PROCEDURE — 3078F DIAST BP <80 MM HG: CPT | Mod: CPTII,HCNC,S$GLB, | Performed by: INTERNAL MEDICINE

## 2025-08-01 PROCEDURE — 99999 PR PBB SHADOW E&M-EST. PATIENT-LVL III: CPT | Mod: PBBFAC,HCNC,, | Performed by: INTERNAL MEDICINE

## 2025-08-01 PROCEDURE — 1159F MED LIST DOCD IN RCRD: CPT | Mod: CPTII,HCNC,S$GLB, | Performed by: INTERNAL MEDICINE

## 2025-08-01 PROCEDURE — 1126F AMNT PAIN NOTED NONE PRSNT: CPT | Mod: CPTII,HCNC,S$GLB, | Performed by: INTERNAL MEDICINE

## 2025-08-01 PROCEDURE — 3061F NEG MICROALBUMINURIA REV: CPT | Mod: CPTII,HCNC,S$GLB, | Performed by: INTERNAL MEDICINE

## 2025-08-01 PROCEDURE — 1111F DSCHRG MED/CURRENT MED MERGE: CPT | Mod: CPTII,HCNC,S$GLB, | Performed by: INTERNAL MEDICINE

## 2025-08-01 PROCEDURE — 3074F SYST BP LT 130 MM HG: CPT | Mod: CPTII,HCNC,S$GLB, | Performed by: INTERNAL MEDICINE

## 2025-08-01 PROCEDURE — 3066F NEPHROPATHY DOC TX: CPT | Mod: CPTII,HCNC,S$GLB, | Performed by: INTERNAL MEDICINE

## 2025-08-01 PROCEDURE — 3008F BODY MASS INDEX DOCD: CPT | Mod: CPTII,HCNC,S$GLB, | Performed by: INTERNAL MEDICINE

## 2025-08-01 PROCEDURE — 99213 OFFICE O/P EST LOW 20 MIN: CPT | Mod: HCNC,S$GLB,, | Performed by: INTERNAL MEDICINE

## 2025-08-01 NOTE — TELEPHONE ENCOUNTER
Spoke with patient's daughter. Daughter expressed concerns about patient needing to switch to warfarin as recommended by Dr Angel. Daughter states that she would be willing to take the patient to to lab as needed to have INR checks done, and will assist with set up and management of his medications including his warfarin. Patient is no longer living with his daughter and understands that he will have to be compliant with taking his medications daily on his own. Daughter states that she will discuss it with her father again and let me know if he will be willing to go to an outpatient lab for lab work as needed since home health is no longer an option and call me back.

## 2025-08-01 NOTE — TELEPHONE ENCOUNTER
----- Message from Med Assistant Franco sent at 7/31/2025  3:33 PM CDT -----  Regarding: RE: Labs    ----- Message -----  From: Ana Gandhi  Sent: 7/31/2025   3:18 PM CDT  To: Stanley SOLOMON Staff  Subject: Labs                                             Patient daughter Chani calling to see If he can do outpatient labs. Please call back @ [phone]. Thank you Ana

## 2025-08-02 NOTE — PROGRESS NOTES
CC: Follow-up     HPI:  The patient is a 69-year-old male with AFib, history of alcohol use currently off of alcohol since October, hypertension, hyperlipidemia, elevated triglycerides, type 2 diabetes, reflux, gout, renal cell cancer status post nephrectomy, anxiety, distal fibular fracture of the right leg, right humeral  head fracture and history of a subdural hematoma presents today as a hospital follow-up.   The patient was scheduled for a Watchman/PVI, with PFA. The patient had a persistent thrombus in left atrial appendage.  The procedure was canceled.  Xarelto dose changed from 15 mg b.I.d. to 20 mg q.d. Coumadin was discussed; but, patient does not drive.  Multaq was discontinued.  He was started on metoprolol 12.5 mg instead of 25 mg.  A referral was placed for home health to set up weekly home INR checks.  Of 14 day cardiac monitor was ordered to assess atrial fibrillation burden.  The patient's main complaint today is bilateral carpal tunnel syndrome.  As well as upper back pain.    ROS: Patient reports no alcohol use since October.  No chest pain.  No shortness a breath.  He does state that his endurance has decreased.  He can not work in his yd like he used to.  No nausea vomiting.  No abdominal pain.    Physical exam:   General appearance: No acute distress   HEENT: Trachea is midline without JVD   Pulmonary: Good inspiratory, expiratory breath sounds were heard.  Lungs are clear auscultation.  Cardiovascular: S1-S2, extremities without edema.    GI: Abdomen was nontender, nondistended without hepatosplenomegaly  Ortho:  The patient has a positive Tinel sign bilaterally.    Assessment:    Thrombus of the left atrial appendage currently on Xarelto 20 mg once a day  2.  Atrial fibrillation  3.  Type 2 diabetes  4.  Fatty liver  5.  Alcohol dependence currently alcohol free   6.  Bilateral carpal tunnel syndrome  7.  Neck and back pain  Plan:    Will order wrist splints for patient  2.  Medications were  discussed the patient has pain.  He reports Voltaren gel works well.  3.  The patient has follow up in 4 weeks.

## 2025-08-11 ENCOUNTER — TELEPHONE (OUTPATIENT)
Dept: ELECTROPHYSIOLOGY | Facility: CLINIC | Age: 70
End: 2025-08-11
Payer: MEDICARE

## 2025-08-11 ENCOUNTER — TELEPHONE (OUTPATIENT)
Dept: CARDIOLOGY | Facility: HOSPITAL | Age: 70
End: 2025-08-11
Payer: MEDICARE

## 2025-08-12 DIAGNOSIS — F41.9 ANXIETY: Primary | ICD-10-CM

## 2025-08-12 RX ORDER — SERTRALINE HYDROCHLORIDE 100 MG/1
TABLET, FILM COATED ORAL
Qty: 90 TABLET | Refills: 3 | Status: SHIPPED | OUTPATIENT
Start: 2025-08-12

## 2025-08-13 ENCOUNTER — TELEPHONE (OUTPATIENT)
Dept: ELECTROPHYSIOLOGY | Facility: CLINIC | Age: 70
End: 2025-08-13
Payer: MEDICARE

## 2025-08-14 ENCOUNTER — TELEPHONE (OUTPATIENT)
Dept: ELECTROPHYSIOLOGY | Facility: CLINIC | Age: 70
End: 2025-08-14
Payer: MEDICARE

## 2025-08-22 ENCOUNTER — ANTI-COAG VISIT (OUTPATIENT)
Dept: CARDIOLOGY | Facility: CLINIC | Age: 70
End: 2025-08-22
Payer: MEDICARE

## 2025-08-22 ENCOUNTER — TELEPHONE (OUTPATIENT)
Dept: ELECTROPHYSIOLOGY | Facility: CLINIC | Age: 70
End: 2025-08-22
Payer: MEDICARE

## 2025-08-22 ENCOUNTER — OFFICE VISIT (OUTPATIENT)
Dept: RHEUMATOLOGY | Facility: CLINIC | Age: 70
End: 2025-08-22
Payer: MEDICARE

## 2025-08-22 VITALS
WEIGHT: 185.44 LBS | SYSTOLIC BLOOD PRESSURE: 117 MMHG | BODY MASS INDEX: 29.8 KG/M2 | DIASTOLIC BLOOD PRESSURE: 67 MMHG | HEART RATE: 62 BPM | HEIGHT: 66 IN

## 2025-08-22 DIAGNOSIS — M15.0 PRIMARY OSTEOARTHRITIS INVOLVING MULTIPLE JOINTS: ICD-10-CM

## 2025-08-22 DIAGNOSIS — I48.0 PAROXYSMAL ATRIAL FIBRILLATION: Primary | ICD-10-CM

## 2025-08-22 DIAGNOSIS — M1A.09X0 IDIOPATHIC CHRONIC GOUT OF MULTIPLE SITES WITHOUT TOPHUS: Primary | ICD-10-CM

## 2025-08-22 DIAGNOSIS — G62.9 NEUROPATHY: ICD-10-CM

## 2025-08-22 PROCEDURE — 3078F DIAST BP <80 MM HG: CPT | Mod: CPTII,HCNC,S$GLB, | Performed by: INTERNAL MEDICINE

## 2025-08-22 PROCEDURE — 3066F NEPHROPATHY DOC TX: CPT | Mod: CPTII,HCNC,S$GLB, | Performed by: INTERNAL MEDICINE

## 2025-08-22 PROCEDURE — 99214 OFFICE O/P EST MOD 30 MIN: CPT | Mod: HCNC,S$GLB,, | Performed by: INTERNAL MEDICINE

## 2025-08-22 PROCEDURE — 3288F FALL RISK ASSESSMENT DOCD: CPT | Mod: CPTII,HCNC,S$GLB, | Performed by: INTERNAL MEDICINE

## 2025-08-22 PROCEDURE — 99999 PR PBB SHADOW E&M-EST. PATIENT-LVL III: CPT | Mod: PBBFAC,HCNC,, | Performed by: INTERNAL MEDICINE

## 2025-08-22 PROCEDURE — 1125F AMNT PAIN NOTED PAIN PRSNT: CPT | Mod: CPTII,HCNC,S$GLB, | Performed by: INTERNAL MEDICINE

## 2025-08-22 PROCEDURE — G2211 COMPLEX E/M VISIT ADD ON: HCPCS | Mod: HCNC,S$GLB,, | Performed by: INTERNAL MEDICINE

## 2025-08-22 PROCEDURE — 3061F NEG MICROALBUMINURIA REV: CPT | Mod: CPTII,HCNC,S$GLB, | Performed by: INTERNAL MEDICINE

## 2025-08-22 PROCEDURE — 3008F BODY MASS INDEX DOCD: CPT | Mod: CPTII,HCNC,S$GLB, | Performed by: INTERNAL MEDICINE

## 2025-08-22 PROCEDURE — 1101F PT FALLS ASSESS-DOCD LE1/YR: CPT | Mod: CPTII,HCNC,S$GLB, | Performed by: INTERNAL MEDICINE

## 2025-08-22 PROCEDURE — 1159F MED LIST DOCD IN RCRD: CPT | Mod: CPTII,HCNC,S$GLB, | Performed by: INTERNAL MEDICINE

## 2025-08-22 PROCEDURE — 1160F RVW MEDS BY RX/DR IN RCRD: CPT | Mod: CPTII,HCNC,S$GLB, | Performed by: INTERNAL MEDICINE

## 2025-08-22 PROCEDURE — 1111F DSCHRG MED/CURRENT MED MERGE: CPT | Mod: CPTII,HCNC,S$GLB, | Performed by: INTERNAL MEDICINE

## 2025-08-22 PROCEDURE — 3074F SYST BP LT 130 MM HG: CPT | Mod: CPTII,HCNC,S$GLB, | Performed by: INTERNAL MEDICINE

## 2025-08-22 RX ORDER — PREGABALIN 75 MG/1
75 CAPSULE ORAL NIGHTLY
Qty: 30 CAPSULE | Refills: 5 | Status: SHIPPED | OUTPATIENT
Start: 2025-08-22

## 2025-08-26 ENCOUNTER — TELEPHONE (OUTPATIENT)
Dept: RHEUMATOLOGY | Facility: CLINIC | Age: 70
End: 2025-08-26
Payer: MEDICARE

## 2025-08-28 ENCOUNTER — HOSPITAL ENCOUNTER (OUTPATIENT)
Dept: CARDIOLOGY | Facility: CLINIC | Age: 70
Discharge: HOME | End: 2025-08-28
Payer: MEDICARE

## 2025-08-28 ENCOUNTER — OFFICE VISIT (OUTPATIENT)
Dept: ELECTROPHYSIOLOGY | Facility: CLINIC | Age: 70
End: 2025-08-28
Payer: MEDICARE

## 2025-08-28 VITALS
DIASTOLIC BLOOD PRESSURE: 56 MMHG | HEART RATE: 66 BPM | SYSTOLIC BLOOD PRESSURE: 106 MMHG | BODY MASS INDEX: 29.65 KG/M2 | HEIGHT: 66 IN | WEIGHT: 184.5 LBS

## 2025-08-28 DIAGNOSIS — I50.22 HEART FAILURE WITH MILDLY REDUCED EJECTION FRACTION (HFMREF): ICD-10-CM

## 2025-08-28 DIAGNOSIS — Z51.81 ANTICOAGULATION MANAGEMENT ENCOUNTER: ICD-10-CM

## 2025-08-28 DIAGNOSIS — I48.0 PAROXYSMAL ATRIAL FIBRILLATION: ICD-10-CM

## 2025-08-28 DIAGNOSIS — Z79.01 ANTICOAGULATION MANAGEMENT ENCOUNTER: ICD-10-CM

## 2025-08-28 DIAGNOSIS — I48.0 PAROXYSMAL ATRIAL FIBRILLATION: Primary | ICD-10-CM

## 2025-08-28 DIAGNOSIS — I10 PRIMARY HYPERTENSION: ICD-10-CM

## 2025-08-28 DIAGNOSIS — I51.3 LA THROMBUS: ICD-10-CM

## 2025-08-28 LAB
OHS QRS DURATION: 90 MS
OHS QTC CALCULATION: 416 MS

## 2025-08-28 PROCEDURE — 99999 PR PBB SHADOW E&M-EST. PATIENT-LVL III: CPT | Mod: PBBFAC,HCNC,, | Performed by: NURSE PRACTITIONER

## 2025-08-28 PROCEDURE — 1101F PT FALLS ASSESS-DOCD LE1/YR: CPT | Mod: CPTII,S$GLB,, | Performed by: NURSE PRACTITIONER

## 2025-08-28 PROCEDURE — 93010 ELECTROCARDIOGRAM REPORT: CPT | Mod: S$GLB,,, | Performed by: INTERNAL MEDICINE

## 2025-08-28 PROCEDURE — 3066F NEPHROPATHY DOC TX: CPT | Mod: CPTII,S$GLB,, | Performed by: NURSE PRACTITIONER

## 2025-08-28 PROCEDURE — 1159F MED LIST DOCD IN RCRD: CPT | Mod: CPTII,S$GLB,, | Performed by: NURSE PRACTITIONER

## 2025-08-28 PROCEDURE — 99214 OFFICE O/P EST MOD 30 MIN: CPT | Mod: S$GLB,,, | Performed by: NURSE PRACTITIONER

## 2025-08-28 PROCEDURE — 3078F DIAST BP <80 MM HG: CPT | Mod: CPTII,S$GLB,, | Performed by: NURSE PRACTITIONER

## 2025-08-28 PROCEDURE — 3074F SYST BP LT 130 MM HG: CPT | Mod: CPTII,S$GLB,, | Performed by: NURSE PRACTITIONER

## 2025-08-28 PROCEDURE — 3288F FALL RISK ASSESSMENT DOCD: CPT | Mod: CPTII,S$GLB,, | Performed by: NURSE PRACTITIONER

## 2025-08-28 PROCEDURE — 3061F NEG MICROALBUMINURIA REV: CPT | Mod: CPTII,S$GLB,, | Performed by: NURSE PRACTITIONER

## 2025-08-28 PROCEDURE — 3008F BODY MASS INDEX DOCD: CPT | Mod: CPTII,S$GLB,, | Performed by: NURSE PRACTITIONER

## 2025-08-28 PROCEDURE — 1126F AMNT PAIN NOTED NONE PRSNT: CPT | Mod: CPTII,S$GLB,, | Performed by: NURSE PRACTITIONER

## 2025-08-29 ENCOUNTER — HOSPITAL ENCOUNTER (OUTPATIENT)
Dept: RADIOLOGY | Facility: HOSPITAL | Age: 70
Discharge: HOME | End: 2025-08-29
Attending: INTERNAL MEDICINE
Payer: MEDICARE

## 2025-08-29 ENCOUNTER — ANTI-COAG VISIT (OUTPATIENT)
Dept: CARDIOLOGY | Facility: CLINIC | Age: 70
End: 2025-08-29
Payer: MEDICARE

## 2025-08-29 ENCOUNTER — PATIENT MESSAGE (OUTPATIENT)
Dept: CARDIOLOGY | Facility: CLINIC | Age: 70
End: 2025-08-29
Payer: MEDICARE

## 2025-08-29 ENCOUNTER — OFFICE VISIT (OUTPATIENT)
Dept: INTERNAL MEDICINE | Facility: CLINIC | Age: 70
End: 2025-08-29
Payer: MEDICARE

## 2025-08-29 VITALS
BODY MASS INDEX: 29.8 KG/M2 | DIASTOLIC BLOOD PRESSURE: 60 MMHG | WEIGHT: 185.44 LBS | OXYGEN SATURATION: 96 % | HEART RATE: 68 BPM | SYSTOLIC BLOOD PRESSURE: 98 MMHG | HEIGHT: 66 IN

## 2025-08-29 DIAGNOSIS — M47.26 OSTEOARTHRITIS OF SPINE WITH RADICULOPATHY, LUMBAR REGION: ICD-10-CM

## 2025-08-29 DIAGNOSIS — G89.29 CHRONIC RIGHT SHOULDER PAIN: ICD-10-CM

## 2025-08-29 DIAGNOSIS — Z79.01 ANTICOAGULATION MANAGEMENT ENCOUNTER: Primary | ICD-10-CM

## 2025-08-29 DIAGNOSIS — I51.3 THROMBUS OF ATRIAL APPENDAGE: ICD-10-CM

## 2025-08-29 DIAGNOSIS — M25.511 CHRONIC RIGHT SHOULDER PAIN: ICD-10-CM

## 2025-08-29 DIAGNOSIS — I48.0 PAROXYSMAL ATRIAL FIBRILLATION: ICD-10-CM

## 2025-08-29 DIAGNOSIS — G56.03 CARPAL TUNNEL SYNDROME, BILATERAL: Primary | ICD-10-CM

## 2025-08-29 DIAGNOSIS — Z51.81 ANTICOAGULATION MANAGEMENT ENCOUNTER: Primary | ICD-10-CM

## 2025-08-29 DIAGNOSIS — M48.02 NEURAL FORAMINAL STENOSIS OF CERVICAL SPINE: ICD-10-CM

## 2025-08-29 DIAGNOSIS — I51.3 LA THROMBUS: ICD-10-CM

## 2025-08-29 DIAGNOSIS — M79.642 BILATERAL HAND PAIN: Primary | ICD-10-CM

## 2025-08-29 DIAGNOSIS — M79.641 BILATERAL HAND PAIN: Primary | ICD-10-CM

## 2025-08-29 PROCEDURE — 99214 OFFICE O/P EST MOD 30 MIN: CPT | Mod: S$GLB,,, | Performed by: INTERNAL MEDICINE

## 2025-08-29 PROCEDURE — 3078F DIAST BP <80 MM HG: CPT | Mod: CPTII,S$GLB,, | Performed by: INTERNAL MEDICINE

## 2025-08-29 PROCEDURE — 3008F BODY MASS INDEX DOCD: CPT | Mod: CPTII,S$GLB,, | Performed by: INTERNAL MEDICINE

## 2025-08-29 PROCEDURE — 3066F NEPHROPATHY DOC TX: CPT | Mod: CPTII,S$GLB,, | Performed by: INTERNAL MEDICINE

## 2025-08-29 PROCEDURE — 3288F FALL RISK ASSESSMENT DOCD: CPT | Mod: CPTII,S$GLB,, | Performed by: INTERNAL MEDICINE

## 2025-08-29 PROCEDURE — 99999 PR PBB SHADOW E&M-EST. PATIENT-LVL V: CPT | Mod: PBBFAC,,, | Performed by: INTERNAL MEDICINE

## 2025-08-29 PROCEDURE — 3061F NEG MICROALBUMINURIA REV: CPT | Mod: CPTII,S$GLB,, | Performed by: INTERNAL MEDICINE

## 2025-08-29 PROCEDURE — 3074F SYST BP LT 130 MM HG: CPT | Mod: CPTII,S$GLB,, | Performed by: INTERNAL MEDICINE

## 2025-08-29 PROCEDURE — 1125F AMNT PAIN NOTED PAIN PRSNT: CPT | Mod: CPTII,S$GLB,, | Performed by: INTERNAL MEDICINE

## 2025-08-29 PROCEDURE — 1101F PT FALLS ASSESS-DOCD LE1/YR: CPT | Mod: CPTII,S$GLB,, | Performed by: INTERNAL MEDICINE

## 2025-08-29 RX ORDER — WARFARIN SODIUM 5 MG/1
5 TABLET ORAL DAILY
Qty: 30 TABLET | Refills: 5 | Status: SHIPPED | OUTPATIENT
Start: 2025-08-29 | End: 2026-02-25

## 2025-09-02 ENCOUNTER — TELEPHONE (OUTPATIENT)
Dept: ORTHOPEDICS | Facility: CLINIC | Age: 70
End: 2025-09-02
Payer: MEDICARE

## 2025-09-03 ENCOUNTER — RESULTS FOLLOW-UP (OUTPATIENT)
Dept: INTERNAL MEDICINE | Facility: CLINIC | Age: 70
End: 2025-09-03
Payer: MEDICARE

## 2025-09-03 ENCOUNTER — TELEPHONE (OUTPATIENT)
Dept: ORTHOPEDICS | Facility: CLINIC | Age: 70
End: 2025-09-03
Payer: MEDICARE

## 2025-09-04 ENCOUNTER — HOSPITAL ENCOUNTER (OUTPATIENT)
Dept: RADIOLOGY | Facility: OTHER | Age: 70
Discharge: HOME | End: 2025-09-04
Attending: ORTHOPAEDIC SURGERY
Payer: MEDICARE

## 2025-09-04 ENCOUNTER — TELEPHONE (OUTPATIENT)
Dept: HEMATOLOGY/ONCOLOGY | Facility: CLINIC | Age: 70
End: 2025-09-04
Payer: MEDICARE

## 2025-09-04 ENCOUNTER — OFFICE VISIT (OUTPATIENT)
Dept: ORTHOPEDICS | Facility: CLINIC | Age: 70
End: 2025-09-04
Payer: MEDICARE

## 2025-09-04 VITALS — HEIGHT: 66 IN | WEIGHT: 187.81 LBS | BODY MASS INDEX: 30.18 KG/M2

## 2025-09-04 DIAGNOSIS — G56.03 BILATERAL CARPAL TUNNEL SYNDROME: Primary | ICD-10-CM

## 2025-09-04 DIAGNOSIS — M79.642 BILATERAL HAND PAIN: ICD-10-CM

## 2025-09-04 DIAGNOSIS — M79.641 BILATERAL HAND PAIN: Primary | ICD-10-CM

## 2025-09-04 DIAGNOSIS — M79.642 BILATERAL HAND PAIN: Primary | ICD-10-CM

## 2025-09-04 DIAGNOSIS — G56.23 CUBITAL TUNNEL SYNDROME, BILATERAL: ICD-10-CM

## 2025-09-04 DIAGNOSIS — M79.641 BILATERAL HAND PAIN: ICD-10-CM

## 2025-09-04 PROCEDURE — 99999 PR PBB SHADOW E&M-EST. PATIENT-LVL III: CPT | Mod: PBBFAC,,, | Performed by: ORTHOPAEDIC SURGERY

## 2025-09-04 PROCEDURE — 1159F MED LIST DOCD IN RCRD: CPT | Mod: CPTII,S$GLB,, | Performed by: ORTHOPAEDIC SURGERY

## 2025-09-04 PROCEDURE — 73130 X-RAY EXAM OF HAND: CPT | Mod: TC,50

## 2025-09-04 PROCEDURE — 73130 X-RAY EXAM OF HAND: CPT | Mod: 26,50,, | Performed by: STUDENT IN AN ORGANIZED HEALTH CARE EDUCATION/TRAINING PROGRAM

## 2025-09-04 PROCEDURE — 99204 OFFICE O/P NEW MOD 45 MIN: CPT | Mod: S$GLB,,, | Performed by: ORTHOPAEDIC SURGERY

## 2025-09-04 PROCEDURE — 3066F NEPHROPATHY DOC TX: CPT | Mod: CPTII,S$GLB,, | Performed by: ORTHOPAEDIC SURGERY

## 2025-09-04 PROCEDURE — 1101F PT FALLS ASSESS-DOCD LE1/YR: CPT | Mod: CPTII,S$GLB,, | Performed by: ORTHOPAEDIC SURGERY

## 2025-09-04 PROCEDURE — 3061F NEG MICROALBUMINURIA REV: CPT | Mod: CPTII,S$GLB,, | Performed by: ORTHOPAEDIC SURGERY

## 2025-09-04 PROCEDURE — 3288F FALL RISK ASSESSMENT DOCD: CPT | Mod: CPTII,S$GLB,, | Performed by: ORTHOPAEDIC SURGERY

## 2025-09-04 PROCEDURE — 1125F AMNT PAIN NOTED PAIN PRSNT: CPT | Mod: CPTII,S$GLB,, | Performed by: ORTHOPAEDIC SURGERY

## 2025-09-04 PROCEDURE — 3008F BODY MASS INDEX DOCD: CPT | Mod: CPTII,S$GLB,, | Performed by: ORTHOPAEDIC SURGERY

## 2025-09-05 ENCOUNTER — OFFICE VISIT (OUTPATIENT)
Facility: CLINIC | Age: 70
End: 2025-09-05
Payer: MEDICARE

## 2025-09-05 ENCOUNTER — TELEPHONE (OUTPATIENT)
Dept: PAIN MEDICINE | Facility: CLINIC | Age: 70
End: 2025-09-05
Payer: MEDICARE

## 2025-09-05 ENCOUNTER — ANTI-COAG VISIT (OUTPATIENT)
Dept: CARDIOLOGY | Facility: CLINIC | Age: 70
End: 2025-09-05
Payer: MEDICARE

## 2025-09-05 VITALS
TEMPERATURE: 98 F | DIASTOLIC BLOOD PRESSURE: 59 MMHG | RESPIRATION RATE: 18 BRPM | HEIGHT: 66 IN | BODY MASS INDEX: 30.29 KG/M2 | WEIGHT: 188.5 LBS | HEART RATE: 99 BPM | SYSTOLIC BLOOD PRESSURE: 101 MMHG | OXYGEN SATURATION: 97 %

## 2025-09-05 DIAGNOSIS — Z79.01 ANTICOAGULATION MANAGEMENT ENCOUNTER: ICD-10-CM

## 2025-09-05 DIAGNOSIS — I51.3 LA THROMBUS: ICD-10-CM

## 2025-09-05 DIAGNOSIS — G89.4 CHRONIC PAIN SYNDROME: ICD-10-CM

## 2025-09-05 DIAGNOSIS — M19.011 OSTEOARTHRITIS OF RIGHT SHOULDER, UNSPECIFIED OSTEOARTHRITIS TYPE: ICD-10-CM

## 2025-09-05 DIAGNOSIS — M25.511 CHRONIC RIGHT SHOULDER PAIN: ICD-10-CM

## 2025-09-05 DIAGNOSIS — I48.0 PAROXYSMAL ATRIAL FIBRILLATION: Primary | ICD-10-CM

## 2025-09-05 DIAGNOSIS — M47.812 CERVICAL SPONDYLOSIS: Primary | ICD-10-CM

## 2025-09-05 DIAGNOSIS — M19.011 OSTEOARTHRITIS OF RIGHT SHOULDER, UNSPECIFIED OSTEOARTHRITIS TYPE: Primary | ICD-10-CM

## 2025-09-05 DIAGNOSIS — G89.29 CHRONIC RIGHT SHOULDER PAIN: ICD-10-CM

## 2025-09-05 DIAGNOSIS — M54.12 CERVICAL RADICULOPATHY: ICD-10-CM

## 2025-09-05 DIAGNOSIS — Z51.81 ANTICOAGULATION MANAGEMENT ENCOUNTER: ICD-10-CM

## 2025-09-05 DIAGNOSIS — M47.816 LUMBAR SPONDYLOSIS: ICD-10-CM

## 2025-09-05 PROCEDURE — 99999 PR PBB SHADOW E&M-EST. PATIENT-LVL V: CPT | Mod: PBBFAC,,, | Performed by: STUDENT IN AN ORGANIZED HEALTH CARE EDUCATION/TRAINING PROGRAM

## 2025-09-05 RX ORDER — DICLOFENAC SODIUM 10 MG/G
2 GEL TOPICAL 4 TIMES DAILY
Qty: 2 EACH | Refills: 3 | Status: SHIPPED | OUTPATIENT
Start: 2025-09-05

## (undated) DEVICE — COVER PRB TRNSDUC 7.6X183CM

## (undated) DEVICE — SOL IRR NACL .9% 3000ML

## (undated) DEVICE — BLADE SAGITTAL 18 X 1.27 X 90M

## (undated) DEVICE — ADHESIVE DERMABOND ADVANCED

## (undated) DEVICE — PAD COLD THERAPY KNEE WRAP ON

## (undated) DEVICE — LINE PRESSURE MONITORING 96IN

## (undated) DEVICE — ELECTRODE REM PLYHSV RETURN 9

## (undated) DEVICE — SHEATH INTRODUCER 9FR 11CM

## (undated) DEVICE — DRESSING AQUACEL AG 3.5X10IN

## (undated) DEVICE — DRAPE INCISE IOBAN 2 23X33IN

## (undated) DEVICE — HOOD T-5 TEAR AWAY STERILE

## (undated) DEVICE — DILATOR COONS TAPER 14FR

## (undated) DEVICE — DIALATOR COONS TAPER 12F 20CM

## (undated) DEVICE — TOURNIQUET SB QC DP 34X4IN

## (undated) DEVICE — DRAPE STERI INSTRUMENT 1018

## (undated) DEVICE — KIT TOTAL KNEE TKOFG

## (undated) DEVICE — SEE MEDLINE ITEM 152487

## (undated) DEVICE — BASIN SPLASH SHLD W/PAD BLUE

## (undated) DEVICE — SUT VICRYL PLUS 0 CT1 18IN

## (undated) DEVICE — SYR ONLY LUER LOCK 20CC

## (undated) DEVICE — SHEATH HEMOSTASIS 8.5FR

## (undated) DEVICE — SEE MEDLINE ITEM 154981

## (undated) DEVICE — NDL 18GA X1 1/2 REG BEVEL

## (undated) DEVICE — DRESSING AQUACEL AG ADV 3.5X12

## (undated) DEVICE — WRAP PROTECTIVE LEG POS STRL

## (undated) DEVICE — SYR 50CC LL

## (undated) DEVICE — SET CEMENT (SCULP)

## (undated) DEVICE — KIT IRR SUCTION HND PIECE

## (undated) DEVICE — PACK EP DRAPE OMC

## (undated) DEVICE — SUT MONOCRYL 3-0 PS-1

## (undated) DEVICE — Device

## (undated) DEVICE — SUT VICRYL PLUS 2-0 CT1 18

## (undated) DEVICE — INTRODUCER HEMOSTASIS 7.5F

## (undated) DEVICE — KIT PROBE COVER WITH GEL

## (undated) DEVICE — SET INTRO PERFORMER 18FR 13CM

## (undated) DEVICE — PATCH CARTO REFERENCE

## (undated) DEVICE — PAD CAST SPECIALIST STRL 6

## (undated) DEVICE — SEE MEDLINE ITEM 157131

## (undated) DEVICE — PUMP COLD THERAPY

## (undated) DEVICE — SEE MEDLINE ITEM 146231

## (undated) DEVICE — MASK FLYTE HOOD PEEL AWAY

## (undated) DEVICE — TIP SMART 309 3X8.5MM

## (undated) DEVICE — SUT VICRYL+ 1 CT1 18IN

## (undated) DEVICE — DILATOR VES COONS .038X16X20CM

## (undated) DEVICE — KIT ENSITE ELECTRODE SURFACE

## (undated) DEVICE — BOWL CEMENT

## (undated) DEVICE — TAPE SURG DURAPORE 2 X10YD

## (undated) DEVICE — PAD DEFIB CADENCE ADULT R2

## (undated) DEVICE — SEE MEDLINE ITEM 146298